# Patient Record
Sex: FEMALE | Race: WHITE | NOT HISPANIC OR LATINO | Employment: OTHER | URBAN - METROPOLITAN AREA
[De-identification: names, ages, dates, MRNs, and addresses within clinical notes are randomized per-mention and may not be internally consistent; named-entity substitution may affect disease eponyms.]

---

## 2017-01-05 ENCOUNTER — TELEPHONE (OUTPATIENT)
Dept: FAMILY MEDICINE | Facility: CLINIC | Age: 62
End: 2017-01-05

## 2017-01-05 DIAGNOSIS — R30.0 DYSURIA: Primary | ICD-10-CM

## 2017-01-05 RX ORDER — SULFAMETHOXAZOLE/TRIMETHOPRIM 800-160 MG
1 TABLET ORAL 2 TIMES DAILY
Qty: 20 TABLET | Refills: 0 | Status: SHIPPED | OUTPATIENT
Start: 2017-01-05 | End: 2017-06-01

## 2017-01-05 NOTE — TELEPHONE ENCOUNTER
Pt calls, c/o uti sxs x 3 days, denies fever, feels hot on occasion, bladder spasms, urethra burns and stinging, has superpubic catheter, has f/u appointment later this month with urologist, was in with  yesterday but did not say anything, willing to come Monday with  to office, MP please advise plan, advised appointment today, wants message to confirm plan with MP, if comes in will use our pharmacy, if MP will send rx will use [facility], inform pt of plan at [phone] (home)   Ekta Mccarthy RN, BSN  Message handled by Nurse Triage.

## 2017-01-31 DIAGNOSIS — E78.5 HYPERLIPIDEMIA LDL GOAL <100: Primary | ICD-10-CM

## 2017-01-31 NOTE — TELEPHONE ENCOUNTER
Simvastatin 20 mg tab        Last Written Prescription Date: 12/16/15  Last Fill Quantity: 90, # refills: 3    Last Office Visit with [facility], [facility] or [facility] prescribing provider:  11/18/16 Dr. Dueñas   Future Office Visit:      CHOLESTEROL   Date Value Ref Range Status   11/18/2016 147 <200 mg/dL Final     HDL CHOLESTEROL   Date Value Ref Range Status   11/18/2016 48* >49 mg/dL Final     LDL CHOLESTEROL CALCULATED   Date Value Ref Range Status   11/18/2016 64 <100 mg/dL Final     Comment:     Desirable:       <100 mg/dl     TRIGLYCERIDES   Date Value Ref Range Status   11/18/2016 174* <150 mg/dL Final     Comment:     Borderline high:  150-199 mg/dl   High:             200-499 mg/dl   Very high:       >499 mg/dl   Fasting specimen       CHOLESTEROL/HDL RATIO   Date Value Ref Range Status   12/12/2014 2.3 0.0 - 5.0 Final     ALT   Date Value Ref Range Status   11/18/2016 18 0 - 50 U/L Final

## 2017-02-01 RX ORDER — SIMVASTATIN 20 MG
20 TABLET ORAL AT BEDTIME
Qty: 90 TABLET | Refills: 3 | Status: SHIPPED | OUTPATIENT
Start: 2017-02-01 | End: 2017-07-26

## 2017-02-01 NOTE — TELEPHONE ENCOUNTER
Prescription approved per [facility] Refill Protocol  Shaniqua Nelson RN BS

## 2017-02-11 ENCOUNTER — TRANSFERRED RECORDS (OUTPATIENT)
Dept: HEALTH INFORMATION MANAGEMENT | Facility: CLINIC | Age: 62
End: 2017-02-11

## 2017-02-27 DIAGNOSIS — E03.4 HYPOTHYROIDISM DUE TO ACQUIRED ATROPHY OF THYROID: Primary | ICD-10-CM

## 2017-02-27 NOTE — TELEPHONE ENCOUNTER
levothyroxine (SYNTHROID,LEVOTHROID) 100 MCG tablet      Last Written Prescription Date: 4/12/16  Last Fill Quantity: 90,  # refills: 2   Last Office Visit with [facility], [facility] or [facility] prescribing provider: Spenser 11/18/16

## 2017-02-28 RX ORDER — LEVOTHYROXINE SODIUM 100 UG/1
100 TABLET ORAL DAILY
Qty: 90 TABLET | Refills: 1 | Status: SHIPPED | OUTPATIENT
Start: 2017-02-28 | End: 2017-06-12

## 2017-02-28 NOTE — TELEPHONE ENCOUNTER
levothyroxine (SYNTHROID,LEVOTHROID) 100 MCG tablet 90 tablet 2 4/12/2016  No   Sig: Take 1 tablet (100 mcg) by mouth daily            TSH   Date Value Ref Range Status   11/18/2016 1.32 0.40 - 4.00 mU/L Final     Prescription approved per [facility] Refill Protocol  Shaniqua Nelson RN BS

## 2017-03-05 DIAGNOSIS — I10 HYPERTENSION GOAL BP (BLOOD PRESSURE) < 140/90: ICD-10-CM

## 2017-03-06 ENCOUNTER — MYC MEDICAL ADVICE (OUTPATIENT)
Dept: FAMILY MEDICINE | Facility: CLINIC | Age: 62
End: 2017-03-06

## 2017-03-06 NOTE — TELEPHONE ENCOUNTER
lisinopril (PRINIVIL,ZESTRIL) 2.5 MG tablet      Last Written Prescription Date: 4/12/16  Last Fill Quantity: 90, # refills: 2  Last Office Visit with G, P or [facility] prescribing provider: 11/18/2016       Potassium   Date Value Ref Range Status   11/18/2016 4.5 3.4 - 5.3 mmol/L Final     Creatinine   Date Value Ref Range Status   11/18/2016 0.92 0.52 - 1.04 mg/dL Final     BP Readings from Last 3 Encounters:   11/18/16 136/86   11/14/16 104/69   10/26/16 138/82         JUNIOR GonzalesT

## 2017-03-07 ENCOUNTER — TELEPHONE (OUTPATIENT)
Dept: FAMILY MEDICINE | Facility: CLINIC | Age: 62
End: 2017-03-07

## 2017-03-07 RX ORDER — LISINOPRIL 2.5 MG/1
TABLET ORAL
Qty: 90 TABLET | Refills: 2 | Status: SHIPPED | OUTPATIENT
Start: 2017-03-07 | End: 2017-07-26

## 2017-03-07 NOTE — TELEPHONE ENCOUNTER
Pt calls, states has not received [employer] reply.  Informed message sent this morning.   Pt states 2 urologists have refused to treat me.  Referred to doctor at  who referred pt to his assistance. Appointment on April 3.   Pt will go to ER as per Paulie provider note.  Sumit Laws RN

## 2017-03-07 NOTE — TELEPHONE ENCOUNTER
Prescription approved per [facility] Refill Protocol.  Ekta Mccarthy RN, BSN

## 2017-03-07 NOTE — TELEPHONE ENCOUNTER
See mychart message, discussed with SH, needs to see urology, sent mychart response, MP SHALONDA Mccarthy, RN, BSN  Message handled by Nurse Triage with [facility] - provider name: CHAO.

## 2017-03-27 ENCOUNTER — PRE VISIT (OUTPATIENT)
Dept: UROLOGY | Facility: CLINIC | Age: 62
End: 2017-03-27

## 2017-04-03 ENCOUNTER — TELEPHONE (OUTPATIENT)
Dept: FAMILY MEDICINE | Facility: CLINIC | Age: 62
End: 2017-04-03

## 2017-04-03 NOTE — TELEPHONE ENCOUNTER
Martha Morillo RN with [facility] [phone] requests verbal order  Recert monthly nursing for catheter changes   1 x month x 2 months.   Informed approved per standing orders.   Home Care staff will fax these orders for MD signature.   Sumit Laws, RN

## 2017-04-04 DIAGNOSIS — G47.00 INSOMNIA: ICD-10-CM

## 2017-04-04 NOTE — TELEPHONE ENCOUNTER
Pending Prescriptions:                       Disp   Refills    traZODone (DESYREL) 100 MG tablet [Pharma*90 tab*0            Sig: TAKE 1 TABLET(100 MG) BY MOUTH EVERY NIGHT AS           NEEDED FOR SLEEP             Last Written Prescription Date: 10/12/2017  Last Fill Quantity: 90; # refills: 1  Last Office Visit with [facility], [facility] or [facility] prescribing provider:  11/18/2016Spenser          Last PHQ-9 score on record=   PHQ-9 SCORE 10/26/2016   Total Score -   Total Score 5       Lab Results   Component Value Date    AST 16 11/18/2016     Lab Results   Component Value Date    ALT 18 11/18/2016

## 2017-04-06 RX ORDER — TRAZODONE HYDROCHLORIDE 100 MG/1
100 TABLET ORAL AT BEDTIME
Qty: 90 TABLET | Refills: 1 | Status: SHIPPED | OUTPATIENT
Start: 2017-04-06 | End: 2017-11-24

## 2017-04-06 NOTE — TELEPHONE ENCOUNTER
Prescription approved per [facility] Refill Protocol  Shaniqua Nelson RN BS

## 2017-04-25 ENCOUNTER — TELEPHONE (OUTPATIENT)
Dept: FAMILY MEDICINE | Facility: CLINIC | Age: 62
End: 2017-04-25

## 2017-04-26 ENCOUNTER — HOSPITAL ENCOUNTER (OUTPATIENT)
Dept: ULTRASOUND IMAGING | Facility: CLINIC | Age: 62
Discharge: HOME | End: 2017-04-26
Attending: FAMILY MEDICINE | Admitting: FAMILY MEDICINE
Payer: MEDICARE

## 2017-04-26 DIAGNOSIS — R11.2 NAUSEA AND VOMITING, INTRACTABILITY OF VOMITING NOT SPECIFIED, UNSPECIFIED VOMITING TYPE: ICD-10-CM

## 2017-04-26 PROCEDURE — 76705 ECHO EXAM OF ABDOMEN: CPT

## 2017-05-01 ENCOUNTER — HOSPITAL ENCOUNTER (OUTPATIENT)
Dept: MAMMOGRAPHY | Facility: CLINIC | Age: 62
Discharge: HOME | End: 2017-05-01
Attending: FAMILY MEDICINE | Admitting: FAMILY MEDICINE
Payer: MEDICARE

## 2017-05-01 DIAGNOSIS — Z12.31 VISIT FOR SCREENING MAMMOGRAM: ICD-10-CM

## 2017-05-01 PROCEDURE — 77063 BREAST TOMOSYNTHESIS BI: CPT

## 2017-05-01 PROCEDURE — G0202 SCR MAMMO BI INCL CAD: HCPCS

## 2017-05-22 DIAGNOSIS — E11.21 TYPE 2 DIABETES MELLITUS WITH DIABETIC NEPHROPATHY (H): ICD-10-CM

## 2017-05-22 NOTE — TELEPHONE ENCOUNTER
Pending Prescriptions:                       Disp   Refills    metFORMIN (GLUCOPHAGE-XR) 500 MG 24 hr ta*360 ta*0            Sig: TAKE 2 TABLETS(1000 MG) BY MOUTH TWICE DAILY WITH           MEALS                   Last Written Prescription Date: 10/17/2016  Last Fill Quantity: 360, # refills: 1  Last Office Visit with [facility], [facility] or [facility] prescribing provider:  11/18/2016, Spenser        BP Readings from Last 3 Encounters:   11/18/16 136/86   11/14/16 104/69   10/26/16 138/82     Lab Results   Component Value Date    MICROL 97 11/18/2016     Lab Results   Component Value Date    UMALCR 124.42 11/18/2016     Creatinine   Date Value Ref Range Status   11/18/2016 0.92 0.52 - 1.04 mg/dL Final   ]  GFR Estimate   Date Value Ref Range Status   11/18/2016 62 >60 mL/min/1.7m2 Final     Comment:     Non  GFR Calc   05/19/2016 >90  Non  GFR Calc   >60 mL/min/1.7m2 Final   05/18/2016 >90  Non  GFR Calc   >60 mL/min/1.7m2 Final     GFR Estimate If Black   Date Value Ref Range Status   11/18/2016 75 >60 mL/min/1.7m2 Final     Comment:      GFR Calc   05/19/2016 >90   GFR Calc   >60 mL/min/1.7m2 Final   05/18/2016 >90   GFR Calc   >60 mL/min/1.7m2 Final     Lab Results   Component Value Date    CHOL 147 11/18/2016     Lab Results   Component Value Date    HDL 48 11/18/2016     Lab Results   Component Value Date    LDL 64 11/18/2016     Lab Results   Component Value Date    TRIG 174 11/18/2016     Lab Results   Component Value Date    CHOLHDLRATIO 2.3 12/12/2014     Lab Results   Component Value Date    AST 16 11/18/2016     Lab Results   Component Value Date    ALT 18 11/18/2016     Lab Results   Component Value Date    A1C 6.3 11/18/2016    A1C 7.7 05/16/2016    A1C 7.9 05/04/2016    A1C 7.9 04/18/2016    A1C 6.9 12/16/2015     Potassium   Date Value Ref Range Status   11/18/2016 4.5 3.4 - 5.3 mmol/L Final

## 2017-05-22 NOTE — LETTER
[address] [id number] [phone]          May 25, 2017    Bhavani White                                                                                                                     [address] [facility] [address]  [facility] [address]            Dear Bhavani,    We recently received a call from your pharmacy requesting a refill of your medication (metformin).    A review of your chart indicates that an appointment is required.  Please contact our office at [phone] to schedule your diabetes (6 month visit) doctor's appointment.    We have authorized one refill of your medication to allow time for you to schedule your appointment.    Taking care of your health is important to us and ongoing visits with your provider are vital to your care.  We look forward to seeing you in the near future.    Sincerely,    Shawnee Dueñas D.O. /Ekta MOONEY

## 2017-05-25 ENCOUNTER — TELEPHONE (OUTPATIENT)
Dept: FAMILY MEDICINE | Facility: CLINIC | Age: 62
End: 2017-05-25

## 2017-05-25 RX ORDER — METFORMIN HCL 500 MG
TABLET, EXTENDED RELEASE 24 HR ORAL
Qty: 360 TABLET | Refills: 0 | Status: SHIPPED | OUTPATIENT
Start: 2017-05-25 | End: 2017-08-31

## 2017-05-25 NOTE — TELEPHONE ENCOUNTER
One refill sent, appointment letter sent  Prescription approved per [facility] Refill Protocol.  Ekta Mccarthy RN, BSN

## 2017-05-25 NOTE — TELEPHONE ENCOUNTER
Yisel calling from  Home Care for recertification of home care services  Requests 1x/ month and 2 prn SNV for cath changes    OT to eval for new seat cushion, noticing skin breakdown with current cushion    Approved per protocol    Shaniqua Nelson RN, BS  Clinical Nurse Triage.

## 2017-05-26 DIAGNOSIS — I10 HYPERTENSION GOAL BP (BLOOD PRESSURE) < 140/90: ICD-10-CM

## 2017-05-26 NOTE — TELEPHONE ENCOUNTER
3 month Supply with 2 RF sent 3/7/17.    lisinopril (PRINIVIL/ZESTRIL) 2.5 MG tablet  Last Written Prescription Date: 3/7/17  Last Fill Quantity: 90,  # refills: 2   Last Office Visit with [facility], [facility] or [facility] prescribing provider:  11/18/2016                                          Next 5 appointments (look out 90 days)     Jun 01, 2017  1:00 PM CDT   PHYSICAL with Shawnee Dueñas DO   [facility]    [address] [place] [id number] [phone]                    Fax sent to pharm informing above.  Please disregard request.    Mila Houser, DIMITRI  May 26, 2017  3:03 PM

## 2017-05-30 RX ORDER — LISINOPRIL 2.5 MG/1
TABLET ORAL
Qty: 90 TABLET | Refills: 0
Start: 2017-05-30

## 2017-06-01 ENCOUNTER — OFFICE VISIT (OUTPATIENT)
Dept: FAMILY MEDICINE | Facility: CLINIC | Age: 62
End: 2017-06-01
Payer: COMMERCIAL

## 2017-06-01 VITALS
BODY MASS INDEX: 45.99 KG/M2 | WEIGHT: 293 LBS | HEIGHT: 67 IN | SYSTOLIC BLOOD PRESSURE: 137 MMHG | RESPIRATION RATE: 16 BRPM | DIASTOLIC BLOOD PRESSURE: 76 MMHG | HEART RATE: 80 BPM | TEMPERATURE: 98.3 F | OXYGEN SATURATION: 96 %

## 2017-06-01 DIAGNOSIS — J96.02 ACUTE RESPIRATORY FAILURE WITH HYPOXIA AND HYPERCAPNIA (H): ICD-10-CM

## 2017-06-01 DIAGNOSIS — R10.11 RUQ PAIN: Primary | ICD-10-CM

## 2017-06-01 DIAGNOSIS — R11.2 NAUSEA AND VOMITING, INTRACTABILITY OF VOMITING NOT SPECIFIED, UNSPECIFIED VOMITING TYPE: ICD-10-CM

## 2017-06-01 DIAGNOSIS — E11.21 TYPE 2 DIABETES MELLITUS WITH DIABETIC NEPHROPATHY, WITHOUT LONG-TERM CURRENT USE OF INSULIN (H): ICD-10-CM

## 2017-06-01 DIAGNOSIS — J96.01 ACUTE RESPIRATORY FAILURE WITH HYPOXIA AND HYPERCAPNIA (H): ICD-10-CM

## 2017-06-01 DIAGNOSIS — F11.90 METHADONE USE: ICD-10-CM

## 2017-06-01 DIAGNOSIS — N39.0 COMPLICATED UTI (URINARY TRACT INFECTION): ICD-10-CM

## 2017-06-01 LAB — HBA1C MFR BLD: 7.7 % (ref 4.3–6)

## 2017-06-01 PROCEDURE — 83036 HEMOGLOBIN GLYCOSYLATED A1C: CPT | Performed by: FAMILY MEDICINE

## 2017-06-01 PROCEDURE — 36415 COLL VENOUS BLD VENIPUNCTURE: CPT | Performed by: FAMILY MEDICINE

## 2017-06-01 PROCEDURE — 93000 ELECTROCARDIOGRAM COMPLETE: CPT | Performed by: FAMILY MEDICINE

## 2017-06-01 PROCEDURE — 99215 OFFICE O/P EST HI 40 MIN: CPT | Performed by: FAMILY MEDICINE

## 2017-06-01 RX ORDER — ONDANSETRON 4 MG/1
4-8 TABLET, ORALLY DISINTEGRATING ORAL EVERY 8 HOURS PRN
Qty: 20 TABLET | Refills: 1 | Status: SHIPPED | OUTPATIENT
Start: 2017-06-01 | End: 2017-12-14

## 2017-06-01 RX ORDER — METHADONE HYDROCHLORIDE 10 MG/1
20 TABLET ORAL 2 TIMES DAILY
Qty: 150 TABLET | Refills: 0 | Status: ON HOLD | COMMUNITY
Start: 2017-06-01 | End: 2020-01-21

## 2017-06-01 RX ORDER — SULFAMETHOXAZOLE/TRIMETHOPRIM 800-160 MG
1 TABLET ORAL 2 TIMES DAILY
Qty: 20 TABLET | Refills: 0 | Status: SHIPPED | OUTPATIENT
Start: 2017-06-01 | End: 2017-07-31

## 2017-06-01 RX ORDER — POLYETHYLENE GLYCOL 3350 17 G/17G
POWDER, FOR SOLUTION ORAL
Refills: 2 | COMMUNITY
Start: 2017-05-10 | End: 2017-07-31

## 2017-06-01 ASSESSMENT — ANXIETY QUESTIONNAIRES
7. FEELING AFRAID AS IF SOMETHING AWFUL MIGHT HAPPEN: NOT AT ALL
3. WORRYING TOO MUCH ABOUT DIFFERENT THINGS: NEARLY EVERY DAY
5. BEING SO RESTLESS THAT IT IS HARD TO SIT STILL: NOT AT ALL
GAD7 TOTAL SCORE: 11
1. FEELING NERVOUS, ANXIOUS, OR ON EDGE: MORE THAN HALF THE DAYS
2. NOT BEING ABLE TO STOP OR CONTROL WORRYING: MORE THAN HALF THE DAYS
6. BECOMING EASILY ANNOYED OR IRRITABLE: MORE THAN HALF THE DAYS
IF YOU CHECKED OFF ANY PROBLEMS ON THIS QUESTIONNAIRE, HOW DIFFICULT HAVE THESE PROBLEMS MADE IT FOR YOU TO DO YOUR WORK, TAKE CARE OF THINGS AT HOME, OR GET ALONG WITH OTHER PEOPLE: EXTREMELY DIFFICULT

## 2017-06-01 ASSESSMENT — PATIENT HEALTH QUESTIONNAIRE - PHQ9: 5. POOR APPETITE OR OVEREATING: MORE THAN HALF THE DAYS

## 2017-06-01 NOTE — MR AVS SNAPSHOT
After Visit Summary   6/1/2017    Bhavani White    MRN: [id number]           Patient Information     Date Of Birth          1955        Visit Information        Provider Department      6/1/2017 1:00 PM Shawnee Dueñas DO [facility]        Today's Diagnoses     RUQ pain    -  1    Complicated UTI (urinary tract infection)        Nausea and vomiting, intractability of vomiting not specified, unspecified vomiting type        Type 2 diabetes mellitus with diabetic nephropathy, without long-term current use of insulin (H)        Methadone use (H)        Acute respiratory failure with hypoxia and hypercapnia (H)           Follow-ups after your visit        Additional Services     GENERAL SURG ADULT REFERRAL       Your provider has referred you to: [facility]: [facility] [phone]   [email or web address]    Please be aware that coverage of these services is subject to the terms and limitations of your health insurance plan.  Call member services at your health plan with any benefit or coverage questions.      Please bring the following with you to your appointment:    (1) Any X-Rays, CTs or MRIs which have been performed.  Contact the facility where they were done to arrange for  prior to your scheduled appointment.   (2) List of current medications   (3) This referral request   (4) Any documents/labs given to you for this referral                  Your next 10 appointments already scheduled     Jun 19, 2017  4:30 PM CDT   (Arrive by 4:15 PM)   Return Visit with Amanda Dinero PA-C   [facility] ([facility])    [address] [place] [address]  4th [facility] [address] [phone]              Who to contact     If you have questions or need follow up information about today's clinic visit or your schedule please contact [place]  [facility] directly at [phone].  Normal or non-critical lab and imaging results will be communicated to you by MyChart, letter or phone within 4 business days after the clinic has received the results. If you do not hear from us within 7 days, please contact the clinic through [email or web address] or phone. If you have a critical or abnormal lab result, we will notify you by phone as soon as possible.  Submit refill requests through [employer] or call your pharmacy and they will forward the refill request to us. Please allow 3 business days for your refill to be completed.          Additional Information About Your Visit        [email or web address] Bean[employer] Information     [employer] gives you secure access to your electronic health record. If you see a primary care provider, you can also send messages to your care team and make appointments. If you have questions, please call your primary care clinic.  If you do not have a primary care provider, please call [phone] and they will assist you.        Care EveryWhere ID     This is your Care EveryWhere ID. This could be used by other organizations to access your [facility] medical records  [id number]        Your Vitals Were     Pulse Temperature Respirations Height Pulse Oximetry BMI (Body Mass Index)    80 98.3  F (36.8  C) (Oral) 16 5' 7\" (1.702 m) 96% 46.67 kg/m2       Blood Pressure from Last 3 Encounters:   06/01/17 137/76   11/18/16 136/86   11/14/16 104/69    Weight from Last 3 Encounters:   06/01/17 298 lb (135.2 kg)   11/18/16 298 lb (135.2 kg)   11/14/16 298 lb (135.2 kg)              We Performed the Following     DEPRESSION ACTION PLAN (DAP)     EKG 12-lead complete w/read - Clinics     GENERAL SURG ADULT REFERRAL     Hemoglobin A1c          Today's Medication Changes          These changes are accurate as of: 6/1/17  2:37 PM.  If you have any questions, ask your nurse or doctor.               Start taking these medicines.        Dose/Directions    ondansetron 4 MG ODT tab "   Commonly known as:  ZOFRAN ODT   Used for:  Nausea and vomiting, intractability of vomiting not specified, unspecified vomiting type   Started by:  Shawnee Dueñas DO        Dose:  4-8 mg   Take 1-2 tablets (4-8 mg) by mouth every 8 hours as needed for nausea   Quantity:  20 tablet   Refills:  1         These medicines have changed or have updated prescriptions.        Dose/Directions    methadone 10 MG tablet   Commonly known as:  DOLOPHINE   This may have changed:    - how much to take  - how to take this  - when to take this  - additional instructions   Used for:  Acute respiratory failure with hypoxia and hypercapnia (H)   Changed by:  Shawnee Dueñas DO        Take 2 pills TID   Quantity:  150 tablet   Refills:  0         Stop taking these medicines if you haven't already. Please contact your care team if you have questions.     LORazepam 1 MG tablet   Commonly known as:  ATIVAN   Stopped by:  Shawnee Dueñas DO           ondansetron 4 MG tablet   Commonly known as:  ZOFRAN   Stopped by:  Shawnee Dueñas DO                Where to get your medicines      These medications were sent to [facility] [address] [address]     Phone:  [phone]     ondansetron 4 MG ODT tab    sulfamethoxazole-trimethoprim 800-160 MG per tablet                Primary Care Provider Office Phone # Fax #    Shawnee Dueñas [phone]       [place] [phone]        Thank you!     Thank you for choosing [facility]  for your care. Our goal is always to provide you with excellent care. Hearing back from our patients is one way we can continue to improve our services. Please take a few minutes to complete the written survey that you may receive in the mail after your visit with us. Thank you!             Your Updated Medication List - Protect others around  you: Learn how to safely use, store and throw away your medicines at www.disposemymeds.org.          This list is accurate as of: 6/1/17  2:37 PM.  Always use your most recent med list.                   Brand Name Dispense Instructions for use    * albuterol 108 (90 BASE) MCG/ACT Inhaler    PROAIR HFA/PROVENTIL HFA/VENTOLIN HFA    1 Inhaler    Inhale 2 puffs into the lungs every 6 hours as needed for shortness of breath / dyspnea or wheezing       * albuterol (2.5 MG/3ML) 0.083% neb solution     30 vial    Take 1 vial (2.5 mg) by nebulization every 4 hours as needed for shortness of breath / dyspnea or wheezing       aspirin 81 MG tablet     30 tablet    Take by mouth daily       blood glucose monitoring lancets     1 Box    Use to test blood sugar 1 times daily or as directed.       blood glucose monitoring meter device kit     1 kit    Use to test blood sugar 1 times daily or as directed.       buPROPion 300 MG 24 hr tablet    WELLBUTRIN XL     TK 1 T PO QD       CYMBALTA PO      Take 120 mg by mouth daily       diazepam 5 MG tablet    VALIUM    30 tablet    Take 1 tablet (5 mg) by mouth every 8 hours as needed for anxiety or other (pain, muscle spasm)       gabapentin 600 MG tablet    NEURONTIN     1,200 mg 3 times daily       levothyroxine 100 MCG tablet    SYNTHROID/LEVOTHROID    90 tablet    Take 1 tablet (100 mcg) by mouth daily       lisinopril 2.5 MG tablet    PRINIVIL/Zestril    90 tablet    TAKE 1 TABLET BY MOUTH EVERY DAY       metFORMIN 500 MG 24 hr tablet    GLUCOPHAGE-XR    360 tablet    TAKE 2 TABLETS(1000 MG) BY MOUTH TWICE DAILY WITH MEALS       methadone 10 MG tablet    DOLOPHINE    150 tablet    Take 2 pills TID       * MIRALAX PO      1 capful daily prn       * polyethylene glycol powder    MIRALAX/GLYCOLAX     TK 17 G PO PRN FOR CONSTIPATION       MULTIVITAMIN PO      1 a day       nystatin 376480 UNIT/GM Powd    MYCOSTATIN    30 g    Apply topically 3 times daily as needed       ondansetron 4  MG ODT tab    ZOFRAN ODT    20 tablet    Take 1-2 tablets (4-8 mg) by mouth every 8 hours as needed for nausea       * order for DME     1 each    Equipment being ordered: Hospital Bed, total electric (head, foot, and height adjustments), with any type side rails, with mattress       * order for DME     1 each    Equipment being ordered: Motorized Wheelchair       * order for DME     1 each    Equipment being ordered: Trapeze bar for hospital bed       * order for DME     1 Package    Equipment being ordered: Nebulizer       * order for DME     1 each    Equipment being ordered: BIPAP.  15/5, Rate of 12, 2L O2 to keep sats 90-95%.       PYRIDIUM PO      Take 2 tablets by mouth 3 times daily as needed Dose unknown       senna-docusate 8.6-50 MG per tablet    SENOKOT-S;PERICOLACE    100 tablet    Take 2 tablets by mouth 2 times daily       simvastatin 20 MG tablet    ZOCOR    90 tablet    Take 1 tablet (20 mg) by mouth At Bedtime       * sulfamethoxazole-trimethoprim 800-160 MG per tablet    BACTRIM DS/SEPTRA DS    14 tablet    Take 1 tablet by mouth 2 times daily       * sulfamethoxazole-trimethoprim 800-160 MG per tablet    BACTRIM DS/SEPTRA DS    20 tablet    Take 1 tablet by mouth 2 times daily       tiZANidine 4 MG tablet    ZANAFLEX    90 tablet    Take 1 tablet (4 mg) by mouth 3 times daily       traZODone 100 MG tablet    DESYREL    90 tablet    Take 1 tablet (100 mg) by mouth At Bedtime       * Notice:  This list has 11 medication(s) that are the same as other medications prescribed for you. Read the directions carefully, and ask your doctor or other care provider to review them with you.

## 2017-06-01 NOTE — NURSING NOTE
"Chief Complaint   Patient presents with     Physical       Initial /76 (BP Location: Left arm, Patient Position: Chair, Cuff Size: Adult Large)  Pulse 80  Temp 98.3  F (36.8  C) (Oral)  Resp 16  Ht 5' 7\" (1.702 m)  Wt 298 lb (135.2 kg)  SpO2 96%  BMI 46.67 kg/m2 Estimated body mass index is 46.67 kg/(m^2) as calculated from the following:    Height as of this encounter: 5' 7\" (1.702 m).    Weight as of this encounter: 298 lb (135.2 kg).  Medication Reconciliation: complete   Ame Ayala, LORENZA      "

## 2017-06-01 NOTE — PROGRESS NOTES
SUBJECTIVE:     CC: Bhavani White is an 61 year old woman who presents for preventive health visit.  However her health is very complicated and she had multiple issues she wanted to discuss, so her visit was changed to a sick visit.       Concerns:  1.) She has continued to have issues with vomiting and RUQ pain after eating.  Symptoms have been getting worse and occur more frequently.  She has lost her appetite and has lost about 15-20 lbs.  She has been vomiting up the Zofran I gave her and is requesting a dissolvable tablet. Gall bladder US showed sludge and wall thickening, but no stones.      2.)  Has a permanent suprapubic cath that is consistently causing issues.  Constantly leaks and she often feels burning sensations with the leakage.  She has seen many urologists in the past, but feels they are not giving her answers.  She is seeing urology at the [facility] on 6/19 and hopes they will be able to help her.  In the past, when she is treated with abx her dysuria goes away.  We stopped getting UAs because she is heavily colonized anyway.  Last abx was last month at urgent care.  No fevers, but recently having burning sensation again and sweating.  She is aware that frequent abx for this may cause abx resistance, however it is difficult for her to live with these symptoms.        3)  She also notes that her home RN has noticed a small area of breakdown in her vulvar area from her catheter.  She is working with them to get a different wheelchair cushion to help offload the area.      4) Patient needs and EKG done to check QR interval since she is on methadone        Social History   Substance Use Topics     Smoking status: Never Smoker     Smokeless tobacco: Never Used     Alcohol use No     The patient does not drink >3 drinks per day nor >7 drinks per week.    Recent Labs   Lab Test  11/18/16   1132 01/12/15  12/12/14   1345  06/26/13   CHOL  147  156  133   --   156   HDL  48*  51  58   --   43   LDL  64  " 70  32   < >  75   TRIG  174*  176  216*   --   192*   CHOLHDLRATIO   --    --   2.3   --   3.63   NHDL  99   --    --    --   113    < > = values in this interval not displayed.     ROS:  C: POSITIVE for chills, change in weight  I: POSITIVE for skin breakdown   E: NEGATIVE for vision changes or irritation  ENT: NEGATIVE for ear, mouth and throat problems  R: NEGATIVE for significant cough or SOB  CV: NEGATIVE for chest pain, palpitations or peripheral edema  GI: POSITIVE for abd pain and nausea  : POSITIVE for dysuria and chronic catheterization  M: NEGATIVE for significant arthralgias or myalgia  N: NEGATIVE for weakness, dizziness or paresthesias  P: POSITIVE For changes in mood (anniversary of her son's death)    Problem list, Medication list, Allergies, and Medical/Social/Surgical histories reviewed in [facility] and updated as appropriate.  OBJECTIVE:     /76 (BP Location: Left arm, Patient Position: Chair, Cuff Size: Adult Large)  Pulse 80  Temp 98.3  F (36.8  C) (Oral)  Resp 16  Ht 5' 7\" (1.702 m)  Wt 298 lb (135.2 kg)  SpO2 96%  BMI 46.67 kg/m2  EXAM:  GENERAL: healthy, alert and no distress  RESP: lungs clear to auscultation - no rales, rhonchi or wheezes  CV: regular rates and rhythm, normal S1 S2, no S3 or S4 and no murmur, click or rub  ABDOMEN: Obese.  +TTP in epigastrum and RUQ.  No masses or organomegaly.  Multiple large hernias in umbilical region    (female): Patient refused vaginal exam to look at skin breakdown     EKG: RBBB    ASSESSMENT/PLAN:       1. RUQ pain  - RUQ pain and vomiting after meals consistent with gall bladder issues  - US shows wall thickening and sludge  - Will refer to surgery to discuss possible removal   - GENERAL SURG ADULT REFERRAL    2. Complicated UTI (urinary tract infection)  - Patient has a chronic suprapubic cath and chronic bacterial colonization  - Patient frequently gets dysuria that resolves with abx and also has frequent leaking issues with her " catheter  - She will be seeing a new urologist at the [facility] later this month to see if they can help manage her complicated urological history   - Discussed that frequent abx for these dysuria episodes could eventually cause abx resistance   - sulfamethoxazole-trimethoprim (BACTRIM DS/SEPTRA DS) 800-160 MG per tablet; Take 1 tablet by mouth 2 times daily  Dispense: 20 tablet; Refill: 0    3. Nausea and vomiting, intractability of vomiting not specified, unspecified vomiting type  - 2/2 likely gall bladder issues   - GENERAL SURG ADULT REFERRAL  - ondansetron (ZOFRAN ODT) 4 MG ODT tab; Take 1-2 tablets (4-8 mg) by mouth every 8 hours as needed for nausea  Dispense: 20 tablet; Refill: 1    4. Type 2 diabetes mellitus with diabetic nephropathy, without long-term current use of insulin (H)  - Hemoglobin A1c    5. Methadone use (H)  - EKG 12-lead complete w/read - Clinics    6. Acute respiratory failure with hypoxia and hypercapnia (H)  - Patient recently changed methadone dose  - [facility]   - methadone (DOLOPHINE) 10 MG tablet; Take 2 pills TID  Dispense: 150 tablet; Refill: 0    F/U after urology and surgery referrals       Shawnee Dueñas,   [facility]

## 2017-06-02 ENCOUNTER — TELEPHONE (OUTPATIENT)
Dept: SURGERY | Facility: CLINIC | Age: 62
End: 2017-06-02

## 2017-06-02 ASSESSMENT — PATIENT HEALTH QUESTIONNAIRE - PHQ9: SUM OF ALL RESPONSES TO PHQ QUESTIONS 1-9: 15

## 2017-06-02 ASSESSMENT — ANXIETY QUESTIONNAIRES: GAD7 TOTAL SCORE: 11

## 2017-06-02 ASSESSMENT — ASTHMA QUESTIONNAIRES: ACT_TOTALSCORE: 25

## 2017-06-02 NOTE — TELEPHONE ENCOUNTER
Referral received from Shawnee Dueñas for Nausea and vomiting.    Attempt #1:    Called patient at [phone].  Spoke with pt she will back back to schedule an appointment when she is ready at [phone].   Anabelle

## 2017-06-05 ENCOUNTER — PRE VISIT (OUTPATIENT)
Dept: UROLOGY | Facility: CLINIC | Age: 62
End: 2017-06-05

## 2017-06-12 DIAGNOSIS — E03.4 HYPOTHYROIDISM DUE TO ACQUIRED ATROPHY OF THYROID: ICD-10-CM

## 2017-06-12 NOTE — TELEPHONE ENCOUNTER
Levothyroxine 0.100mg tab      Last Written Prescription Date: 02/28/17  Last Quantity: 90, # refills: 1  Last Office Visit with G, P or [facility] prescribing provider: 06/01/17 Dr. Dueñas     TSH   Date Value Ref Range Status   11/18/2016 1.32 0.40 - 4.00 mU/L Final

## 2017-06-15 RX ORDER — LEVOTHYROXINE SODIUM 100 UG/1
TABLET ORAL
Qty: 90 TABLET | Refills: 0 | Status: SHIPPED | OUTPATIENT
Start: 2017-06-15 | End: 2017-09-27

## 2017-06-16 ENCOUNTER — TRANSFERRED RECORDS (OUTPATIENT)
Dept: HEALTH INFORMATION MANAGEMENT | Facility: CLINIC | Age: 62
End: 2017-06-16

## 2017-06-26 ENCOUNTER — OFFICE VISIT (OUTPATIENT)
Dept: SURGERY | Facility: CLINIC | Age: 62
End: 2017-06-26
Payer: COMMERCIAL

## 2017-06-26 VITALS
DIASTOLIC BLOOD PRESSURE: 80 MMHG | HEIGHT: 67 IN | BODY MASS INDEX: 45.99 KG/M2 | WEIGHT: 293 LBS | SYSTOLIC BLOOD PRESSURE: 120 MMHG | OXYGEN SATURATION: 96 % | HEART RATE: 92 BPM

## 2017-06-26 DIAGNOSIS — K43.9 ABDOMINAL WALL HERNIA: ICD-10-CM

## 2017-06-26 DIAGNOSIS — R10.11 ABDOMINAL PAIN, RIGHT UPPER QUADRANT: Primary | ICD-10-CM

## 2017-06-26 PROCEDURE — 99204 OFFICE O/P NEW MOD 45 MIN: CPT | Performed by: SURGERY

## 2017-06-26 ASSESSMENT — ENCOUNTER SYMPTOMS
VOMITING: 1
NAUSEA: 1
ABDOMINAL PAIN: 1
APPETITE CHANGE: 1
CONSTIPATION: 1

## 2017-06-26 NOTE — PATIENT INSTRUCTIONS
CT ABDOMEN AND PELVIS WITH CONTRAST     Date: 6-28-17  Time: 3:00 pm     Location:[place] [address] [place]  [address]        Please check in at 2:30 pm       Preparation for CT scanning      Do not eat or drink anything TWO hours prior to your exam except for prep:    Mix all the liquid from the bottle Omnipaque 140 (50mL) with 24 ounces of water.     Drink 1/2 of the mixture at:  1:00 pm  (two hours before)  Drink the rest of the mixture at:  2:00 pm  (one hour before)    Please bring an updated list of all your medications, including IV Chemo Therapy over the counter and herbal supplements.    For questions regarding your test please call [phone].   If you are taking any medications and you have questions, please call your primary care physician.

## 2017-06-26 NOTE — PROGRESS NOTES
HPI      ROS (Review of Systems):      Positive for appetite change, back injury, thyroid problem, diabetes, DM controlled with Oral Medication and System Review.   GASTROINTESTINAL: Positive for nausea, vomiting, abdominal pain and constipation.   MUSCULOSKELETAL: Positive for back pain.  System Review has been done        Physical Exam

## 2017-06-26 NOTE — MR AVS SNAPSHOT
After Visit Summary   6/26/2017    Bhavani White    MRN: [id number]           Patient Information     Date Of Birth          1955        Visit Information        Provider Department      6/26/2017 3:00 PM Ismael Meza MD [facility] General [facility]      Today's Diagnoses     Abdominal pain, right upper quadrant    -  1    Abdominal wall hernia          Care Instructions    CT ABDOMEN AND PELVIS WITH CONTRAST     Date: 6-28-17  Time: 3:00 pm     Location:[facility]  [address] [place]  [address]        Please check in at 2:30 pm       Preparation for CT scanning      Do not eat or drink anything TWO hours prior to your exam except for prep:    Mix all the liquid from the bottle Omnipaque 140 (50mL) with 24 ounces of water.     Drink 1/2 of the mixture at:  1:00 pm  (two hours before)  Drink the rest of the mixture at:  2:00 pm  (one hour before)    Please bring an updated list of all your medications, including IV Chemo Therapy over the counter and herbal supplements.    For questions regarding your test please call [phone].   If you are taking any medications and you have questions, please call your primary care physician.                 Follow-ups after your visit        Your next 10 appointments already scheduled     Jun 28, 2017  3:00 PM CDT   CT ABDOMEN PELVIS W CONTRAST with RHCT2   [facility] ([facility])    [address] [address]   [phone]           Please bring any scans or X-rays taken at other hospitals, if similar tests were done. Also bring a list of your medicines, including vitamins, minerals and over-the-counter drugs. It is safest to leave personal items at home.  Be sure to tell your doctor:   If you have any allergies.   If there s any chance you are pregnant.   If you are breastfeeding.   If you have any special needs.  You may have  contrast for this exam. To prepare:   Do not eat or drink for 2 hours before your exam. If you need to take medicine, you may take it with small sips of water. (We may ask you to take liquid medicine as well.)   The day before your exam, drink extra fluids at least six 8-ounce glasses (unless your doctor tells you to restrict your fluids).  Patients over 70 or patients with diabetes or kidney problems:   If you haven t had a blood test (creatinine test) within the last 30 days, go to your clinic or Diagnostic Imaging Department for this test.  If you have diabetes:   If your kidney function is normal, continue taking your metformin (Avandamet, Glucophage, Glucovance, Metaglip) on the day of your exam.   If your kidney function is abnormal, wait 48 hours before restarting this medicine.  You will have oral contrast for this exam:   You will drink the contrast at home. Get this from your clinic or Diagnostic Imaging Department. Please follow the directions given.  Please wear loose clothing, such as a sweat suit or jogging clothes. Avoid snaps, zippers and other metal. We may ask you to undress and put on a hospital gown.  If you have any questions, please call the Imaging Department where you will have your exam.            Jun 30, 2017  1:30 PM CDT   NM HEPATOBILIARY SCAN W JONNA LOPEZ with [id number]   [facility] [place] ([facility])    [address] [address] [address] [phone]           Please bring a list of your medicines to the exam. (Include vitamins, minerals and over-the-counter drugs.) You should wear comfortable clothes. Leave your valuables at home. Please bring related prior results and films.  Tell your doctor:   If you are breastfeeding or may be pregnant.   If you have had a barium test within the past few days. Barium may change the results of certain exams.   If you think you may need sedation (medicine to help you relax).  No food or drink  (including water) for 4 hours prior to the exam.  No morphine or morphine derivatives for 6 hours prior to the exam.  Please call your Imaging Department at your exam site with any questions.            Jul 31, 2017  5:00 PM CDT   (Arrive by 4:45 PM)   Return Visit with Yoandy Rios MD   [facility] ([facility])    [address] [address] [phone]              Future tests that were ordered for you today     Open Future Orders        Priority Expected Expires Ordered    NM HepatOBiliary Scan w CCK Routine 6/26/2017 6/26/2018 6/26/2017    CT Abdomen Pelvis w Contrast Routine 6/26/2017 6/26/2018 6/26/2017            Who to contact     If you have questions or need follow up information about today's clinic visit or your schedule please contact [facility] directly at [phone].  Normal or non-critical lab and imaging results will be communicated to you by Glusterhart, letter or phone within 4 business days after the clinic has received the results. If you do not hear from us within 7 days, please contact the clinic through [employer] or phone. If you have a critical or abnormal lab result, we will notify you by phone as soon as possible.  Submit refill requests through Corebook or call your pharmacy and they will forward the refill request to us. Please allow 3 business days for your refill to be completed.          Additional Information About Your Visit        GlusterharLiquipel Information     Corebook gives you secure access to your electronic health record. If you see a primary care provider, you can also send messages to your care team and make appointments. If you have questions, please call your primary care clinic.  If you do not have a primary care provider, please call [phone] and they will assist you.        Care EveryWhere ID     This is your Care EveryWhere ID. This could be  "used by other organizations to access your [facility] medical records  [id number]        Your Vitals Were     Pulse Height Pulse Oximetry Breastfeeding? BMI (Body Mass Index)       92 5' 7\" (1.702 m) 96% No 46.67 kg/m2        Blood Pressure from Last 3 Encounters:   06/26/17 120/80   06/01/17 137/76   11/18/16 136/86    Weight from Last 3 Encounters:   06/26/17 298 lb (135.2 kg)   06/01/17 298 lb (135.2 kg)   11/18/16 298 lb (135.2 kg)               Primary Care Provider Office Phone # Fax #    Shawnee Dueñas [phone]       [address] [address]        Equal Access to Services     JOSE LUIS GOMEZ : Hadii bert yu Sosekou, waaxda luqadaha, qaybta kaalmada adeegyada, rowan gaviria . So [facility] [phone].    ATENCIÓN: Si habla español, tiene a shay disposición servicios gratuitos de asistencia lingüística. Zoraida al [phone].    We comply with applicable federal civil rights laws and [place] laws. We do not discriminate on the basis of race, color, national origin, age, disability sex, sexual orientation or gender identity.            Thank you!     Thank you for choosing [facility]  for your care. Our goal is always to provide you with excellent care. Hearing back from our patients is one way we can continue to improve our services. Please take a few minutes to complete the written survey that you may receive in the mail after your visit with us. Thank you!             Your Updated Medication List - Protect others around you: Learn how to safely use, store and throw away your medicines at www.disposemymeds.org.          This list is accurate as of: 6/26/17  4:11 PM.  Always use your most recent med list.                   Brand Name Dispense Instructions for use Diagnosis    * albuterol 108 (90 BASE) MCG/ACT Inhaler    PROAIR HFA/PROVENTIL HFA/VENTOLIN HFA    1 Inhaler    Inhale 2 puffs into " the lungs every 6 hours as needed for shortness of breath / dyspnea or wheezing    Moderate persistent asthma with acute exacerbation       * albuterol (2.5 MG/3ML) 0.083% neb solution     30 vial    Take 1 vial (2.5 mg) by nebulization every 4 hours as needed for shortness of breath / dyspnea or wheezing    Moderate persistent asthma with acute exacerbation       aspirin 81 MG tablet     30 tablet    Take by mouth daily    Type 2 diabetes, HbA1c goal < 7% (H)       blood glucose monitoring lancets     1 Box    Use to test blood sugar 1 times daily or as directed.    Type 2 diabetes mellitus with diabetic nephropathy (H)       blood glucose monitoring meter device kit     1 kit    Use to test blood sugar 1 times daily or as directed.    Type 2 diabetes mellitus with diabetic nephropathy (H)       buPROPion 300 MG 24 hr tablet    WELLBUTRIN XL     TK 1 T PO QD        CYMBALTA PO      Take 120 mg by mouth daily        diazepam 5 MG tablet    VALIUM    30 tablet    Take 1 tablet (5 mg) by mouth every 8 hours as needed for anxiety or other (pain, muscle spasm)    Acute respiratory failure with hypoxia and hypercapnia (H)       gabapentin 600 MG tablet    NEURONTIN     1,200 mg 3 times daily        levothyroxine 100 MCG tablet    SYNTHROID/LEVOTHROID    90 tablet    TAKE 1 TABLET(100 MCG) BY MOUTH DAILY    Hypothyroidism due to acquired atrophy of thyroid       lisinopril 2.5 MG tablet    PRINIVIL/Zestril    90 tablet    TAKE 1 TABLET BY MOUTH EVERY DAY    Hypertension goal BP (blood pressure) < 140/90       metFORMIN 500 MG 24 hr tablet    GLUCOPHAGE-XR    360 tablet    TAKE 2 TABLETS(1000 MG) BY MOUTH TWICE DAILY WITH MEALS    Type 2 diabetes mellitus with diabetic nephropathy (H)       methadone 10 MG tablet    DOLOPHINE    150 tablet    Take 2 pills TID    Acute respiratory failure with hypoxia and hypercapnia (H)       * MIRALAX PO      1 capful daily prn        * polyethylene glycol powder    MIRALAX/GLYCOLAX      TK 17 G PO PRN FOR CONSTIPATION        MULTIVITAMIN PO      1 a day        nystatin 609459 UNIT/GM Powd    MYCOSTATIN    30 g    Apply topically 3 times daily as needed    Candidiasis of skin       ondansetron 4 MG ODT tab    ZOFRAN ODT    20 tablet    Take 1-2 tablets (4-8 mg) by mouth every 8 hours as needed for nausea    Nausea and vomiting, intractability of vomiting not specified, unspecified vomiting type       * order for DME     1 each    Equipment being ordered: Hospital Bed, total electric (head, foot, and height adjustments), with any type side rails, with mattress    Wheelchair bound, Ankylosing spondylitis (H), Spinal stenosis in cervical region, Lumbar spinal stenosis, Fibromyalgia, Chronic low back pain       * order for DME     1 each    Equipment being ordered: Motorized Wheelchair    Wheelchair bound, Ankylosing spondylitis (H), Spinal stenosis in cervical region, Lumbar spinal stenosis, Fibromyalgia, Chronic low back pain       * order for DME     1 each    Equipment being ordered: Trapeze bar for hospital bed    Wheelchair bound, Ankylosing spondylitis (H), Spinal stenosis in cervical region, Lumbar spinal stenosis, Fibromyalgia, Chronic low back pain       * order for DME     1 Package    Equipment being ordered: Nebulizer    Moderate persistent asthma with acute exacerbation       * order for DME     1 each    Equipment being ordered: BIPAP.  15/5, Rate of 12, 2L O2 to keep sats 90-95%.    Acute respiratory failure with hypoxia and hypercapnia (H)       PYRIDIUM PO      Take 2 tablets by mouth 3 times daily as needed Dose unknown        senna-docusate 8.6-50 MG per tablet    SENOKOT-S;PERICOLACE    100 tablet    Take 2 tablets by mouth 2 times daily    Acute respiratory failure with hypoxia and hypercapnia (H)       simvastatin 20 MG tablet    ZOCOR    90 tablet    Take 1 tablet (20 mg) by mouth At Bedtime    Hyperlipidemia LDL goal <100       * sulfamethoxazole-trimethoprim 800-160 MG per  tablet    BACTRIM DS/SEPTRA DS    14 tablet    Take 1 tablet by mouth 2 times daily    Dysuria       * sulfamethoxazole-trimethoprim 800-160 MG per tablet    BACTRIM DS/SEPTRA DS    20 tablet    Take 1 tablet by mouth 2 times daily    Complicated UTI (urinary tract infection)       tiZANidine 4 MG tablet    ZANAFLEX    90 tablet    Take 1 tablet (4 mg) by mouth 3 times daily        traZODone 100 MG tablet    DESYREL    90 tablet    Take 1 tablet (100 mg) by mouth At Bedtime    Insomnia       * Notice:  This list has 11 medication(s) that are the same as other medications prescribed for you. Read the directions carefully, and ask your doctor or other care provider to review them with you.

## 2017-06-26 NOTE — LETTER
"2017    RE:  Bhavani White-:  57    Chief complaint:  Abdominal pain, epigastric     HPI:  This patient is a 61 year old  female who presents with right upper quadrant abdominal pain and nausea for the past 18 months.  The pain/nausea is intermittent.  It occurs with eating.  She also reports diffuse abdominal pain and stomach irritation.  She also has back pain but this may be due to her primary back problems.  She reports a significant incisional hernia that is also bothersome to her from her previous removal of a large ovarian tumor.  She has been told that she has a \"Greenlandic cheese\" hernia.  She has an indwelling suprapubic catheter chronically (neurogenic bladder) but reports that she will be seeing a urologist at [facility] for possible removal of this catheter next week.  She is here with her significant other.     Past Medical History:  Has a past medical history of Anxiety; Asthma; Depression; DM (diabetes mellitus) (H) (); Frequent UTI; History of ulcer disease (2014); Hypertension; Hypothyroid; Iatrogenic Cushing's disease (H); Morbid obesity (H); MRSA (methicillin resistant staph aureus) culture positive (2012); Osteoarthritis; Other chronic pain; and Sleep apnea.     Review of Systems:  The 10 point Review of Systems is negative other than noted in the HPI and above.     Physical Exam:  General - This is a well developed, well nourished female in no apparent distress.  HEENT - Normocephalic, atraumatic.  NO scleral icterus.  Neck - supple without masses  Lungs - respirations regular and non-labored.    Abdomen:Body mass index is 46.67 kg/(m^2).                        soft, non-distended with mild tenderness noted in the epigastric region.  All hernias easily palpable in the central abdomen with multiple loculations consistent with multiple incisional hernias. .  Extremities - warm without edema  Neurologic - nonfocal     Relevant labs:  Liver function panel- " normal  WBC- normal  Lipase- normal     Imaging:  Ultrasound RUQ: negative cholelithiasis (possible sludge -question of small stones in sludge), negative gallbladder wall thickening (0.3 cm), negative ductal dilatation, negative pericholecystic fluid, negative sonographic Vogel's sign.     Assessment and Plan:  It is my impression that she has probable cholecystitis, significant incisional hernia, neurologic problems including neurogenic bladder with chronic suprapubic catheter.  I have offered her a Laparoscopic cholecystectomy with cholangiograms.  We have discussed the indication, risks and expected recovery.  Risks discussed included duct/ organ injury, conversion to open surgery with increased recovery, post cholecystectomy syndromes and their treatment. We discussed option of HIDA scan to further assess GB function (recommended by radiology).   We also discussed alternatives including dissolution, lithotripsy and low fat diet. Literature was given to review.        We discussed her hernias.  She would like to undergo repair of these hernias.  I discussed cosmetics (removal of the umbilicus) and the use of mesh to reduce the risk of recurrence.  I think biologic mesh may be safer in her situation especially if she continues to require chronic suprapubic catheter.  Her morbid obesity would make recurrence more likely.     For now, she will see a urologist at [place] as already planned to see if her catheter can be removed.  We will obtain a hiatus scan as recommended by radiology and a CT scan of the abdomen to more fully evaluate her incisional hernia.  I will attempt to contact her neurologist (Dr. Khris Amado) to see if he thinks that some of her abdominal symptoms may be related to her neurologic conditions.     We will work on scheduling these imaging tests at the patient s convenience.     Ismael Meza MD  [facility] [place]

## 2017-06-26 NOTE — PROGRESS NOTES
"Chief complaint:  Abdominal pain, epigastric    HPI:  This patient is a 61 year old  female who presents with right upper quadrant abdominal pain and nausea for the past 18 months.  The pain/nausea is intermittent.  It occurs with eating.  She also reports diffuse abdominal pain and stomach irritation.  She also has back pain but this may be due to her primary back problems.  She reports a significant incisional hernia that is also bothersome to her from her previous removal of a large ovarian tumor.  She has been told that she has a \"Canadian cheese\" hernia.  She has an indwelling suprapubic catheter chronically (neurogenic bladder) but reports that she will be seeing a urologist at [place] for possible removal of this catheter next week.  She is here with her significant other.    Past Medical History:   has a past medical history of Anxiety; Asthma; Depression; DM (diabetes mellitus) (H) (2003); Frequent UTI; History of ulcer disease (11/22/2014); Hypertension; Hypothyroid; Iatrogenic Cushing's disease (H); Morbid obesity (H); MRSA (methicillin resistant staph aureus) culture positive (5/24/2012); Osteoarthritis; Other chronic pain; and Sleep apnea.    Past Surgical History:  Past Surgical History:   Procedure Laterality Date     C LAMINECTOMY,FACETECTOMY,LUMBAR  1982     C TOTAL KNEE ARTHROPLASTY  1999    left     DISCECTOMY, FUSION CERVICAL ANTERIOR THREE+ LEVELS, COMBINED Left 5/3/2016    Procedure: COMBINED DISCECTOMY, FUSION CERVICAL ANTERIOR THREE+ LEVELS;  Surgeon: Jasvir Torres MD;  Location: [facility] OR     GENITOURINARY SURGERY      suprapubic catheter     HERNIA REPAIR  1957    double hernia     HYSTERECTOMY, PAP NO LONGER INDICATED      CELIA and large ovarian tumor removed     SURGICAL HISTORY OF -       left cataract surgery     SURGICAL HISTORY OF -   12/14    right cataract surgery and left laser revision     SURGICAL HISTORY OF -   March 2015    left carpal tunnel release     " TONSILLECTOMY  1974        Social History:  Social History     Social History     Marital status:      Spouse name: N/A     Number of children: N/A     Years of education: N/A     Occupational History     Not on file.     Social History Main Topics     Smoking status: Never Smoker     Smokeless tobacco: Never Used     Alcohol use No     Drug use: No     Sexual activity: Not Currently     Other Topics Concern     Not on file     Social History Narrative        Family History:  Family History   Problem Relation Age of Onset     Depression Son      Hypertension Mother      HEART DISEASE Mother      bypass     Depression Mother      Hypertension Father      Connective Tissue Disorder Father      ankylosing spondylitis     CANCER Father      colon; prostate     Other Cancer Father      bladder cancer     Depression Sister        Review of Systems:  The 10 point Review of Systems is negative other than noted in the HPI and above.    Physical Exam:  General - This is a well developed, well nourished female in no apparent distress.  HEENT - Normocephalic, atraumatic.  NO scleral icterus.  Neck - supple without masses  Lungs - respirations regular and non-labored.    Abdomen:Body mass index is 46.67 kg/(m^2).   soft, non-distended with mild tenderness noted in the epigastric region.  All hernias easily palpable in the central abdomen with multiple loculations consistent with multiple incisional hernias. .  Extremities - warm without edema  Neurologic - nonfocal    Relevant labs:  Liver function panel- normal  WBC- normal  Lipase- normal    Imaging:  Ultrasound RUQ: negative cholelithiasis (possible sludge -question of small stones in sludge), negative gallbladder wall thickening (0.3 cm), negative ductal dilatation, negative pericholecystic fluid, negative sonographic Vogel's sign.    Assessment and Plan:  It is my impression that she has probable cholecystitis, significant incisional hernia, neurologic problems  including neurogenic bladder with chronic suprapubic catheter.  I have offered her a Laparoscopic cholecystectomy with cholangiograms.  We have discussed the indication, risks and expected recovery.  Risks discussed included duct/ organ injury, conversion to open surgery with increased recovery, post cholecystectomy syndromes and their treatment. We discussed option of HIDA scan to further assess GB function (recommended by radiology).   We also discussed alternatives including dissolution, lithotripsy and low fat diet. Literature was given to review.      We discussed her hernias.  She would like to undergo repair of these hernias.  I discussed cosmetics (removal of the umbilicus) and the use of mesh to reduce the risk of recurrence.  I think biologic mesh may be safer in her situation especially if she continues to require chronic suprapubic catheter.  Her morbid obesity would make recurrence more likely.    For now, she will see a urologist at [place] as already planned to see if her catheter can be removed.  We will obtain a hiatus scan as recommended by radiology and a CT scan of the abdomen to more fully evaluate her incisional hernia.  I will attempt to contact her neurologist (Dr. Khris Amado) to see if he thinks that some of her abdominal symptoms may be related to her neurologic conditions.    We will work on scheduling these imaging tests at the patient s convenience.    Ismael Meza MD  [facility] [place]    Please route or send letter to:  Primary Care Provider (PCP) and Include Progress Note

## 2017-06-26 NOTE — LETTER
[facility]    [address], [address]  [place], [place] [address]  Phone [phone]  Fax [phone] [address], [address]  [place] [address]  Phone [phone]  Fax [phone]    [email or web address]   Dear Dr. Amado,  I just saw our common patient Bhavani White.  She is seeing me for gallbladder problems and an incisional hernia.  She reports chronic GI problems particularly including constipation.  I'm writing to ask you whether this could be in part due to her neurologic problems.  Please let me know if you think that these are all related.  Currently, we are contemplating cholecystectomy and hernia repair during the same anesthetic.  Any additional perioperative recommendations you could give us would also be appreciated.    Ismael Meza MD  6/26/2017 4:12 PM

## 2017-06-28 ENCOUNTER — HOSPITAL ENCOUNTER (OUTPATIENT)
Dept: CT IMAGING | Facility: CLINIC | Age: 62
Discharge: HOME | End: 2017-06-28
Attending: SURGERY | Admitting: SURGERY
Payer: MEDICARE

## 2017-06-28 DIAGNOSIS — K43.9 ABDOMINAL WALL HERNIA: ICD-10-CM

## 2017-06-28 LAB
CREAT BLD-MCNC: 0.9 MG/DL (ref 0.52–1.04)
GFR SERPL CREATININE-BSD FRML MDRD: 64 ML/MIN/1.7M2

## 2017-06-28 PROCEDURE — 82565 ASSAY OF CREATININE: CPT

## 2017-06-28 PROCEDURE — 74177 CT ABD & PELVIS W/CONTRAST: CPT

## 2017-06-28 PROCEDURE — 25000128 H RX IP 250 OP 636: Performed by: SURGERY

## 2017-06-28 RX ORDER — IOPAMIDOL 755 MG/ML
500 INJECTION, SOLUTION INTRAVASCULAR ONCE
Status: COMPLETED | OUTPATIENT
Start: 2017-06-28 | End: 2017-06-28

## 2017-06-28 RX ADMIN — IOPAMIDOL 100 ML: 755 INJECTION, SOLUTION INTRAVENOUS at 14:52

## 2017-06-28 RX ADMIN — SODIUM CHLORIDE 65 ML: 9 INJECTION, SOLUTION INTRAVENOUS at 14:52

## 2017-07-12 ENCOUNTER — HOSPITAL ENCOUNTER (OUTPATIENT)
Dept: NUCLEAR MEDICINE | Facility: CLINIC | Age: 62
Setting detail: NUCLEAR MEDICINE
Discharge: HOME | End: 2017-07-12
Attending: SURGERY | Admitting: SURGERY
Payer: MEDICARE

## 2017-07-12 DIAGNOSIS — R10.11 ABDOMINAL PAIN, RIGHT UPPER QUADRANT: ICD-10-CM

## 2017-07-12 PROCEDURE — 25000128 H RX IP 250 OP 636: Performed by: SURGERY

## 2017-07-12 PROCEDURE — 78227 HEPATOBIL SYST IMAGE W/DRUG: CPT

## 2017-07-12 PROCEDURE — 34300033 ZZH RX 343: Performed by: SURGERY

## 2017-07-12 PROCEDURE — A9537 TC99M MEBROFENIN: HCPCS | Performed by: SURGERY

## 2017-07-12 RX ORDER — KIT FOR THE PREPARATION OF TECHNETIUM TC 99M MEBROFENIN 45 MG/10ML
6 INJECTION, POWDER, LYOPHILIZED, FOR SOLUTION INTRAVENOUS ONCE
Status: COMPLETED | OUTPATIENT
Start: 2017-07-12 | End: 2017-07-12

## 2017-07-12 RX ORDER — SINCALIDE 5 UG/5ML
0.02 INJECTION, POWDER, LYOPHILIZED, FOR SOLUTION INTRAVENOUS ONCE
Status: COMPLETED | OUTPATIENT
Start: 2017-07-12 | End: 2017-07-12

## 2017-07-12 RX ADMIN — MEBROFENIN 6 MILLICURIE: 45 INJECTION, POWDER, LYOPHILIZED, FOR SOLUTION INTRAVENOUS at 11:57

## 2017-07-12 RX ADMIN — SINCALIDE 2.7 MCG: 5 INJECTION, POWDER, LYOPHILIZED, FOR SOLUTION INTRAVENOUS at 13:16

## 2017-07-14 NOTE — PROGRESS NOTES
"Patient was notified of these results. HIDA scan is normal - cannot recommend cholecystectomy. She is interested in hernia repair and I discussed this with her. She reports ICU stay with \"bleeding ulcers\" but no current Rx for this. Discussed possibility of GOO due to ulcer disease (although CT shows no signs of this). Would be advisable to have GI evaluation - GI consult for possible endoscopy or at least UGI contrast study prior to having hernia repair for Sx of nausea and vomiting. She will contact her primary for referrals  Ismael Meza MD  7/14/2017 1:36 PM"

## 2017-07-26 ENCOUNTER — APPOINTMENT (OUTPATIENT)
Dept: CT IMAGING | Facility: CLINIC | Age: 62
End: 2017-07-26
Attending: EMERGENCY MEDICINE
Payer: MEDICARE

## 2017-07-26 ENCOUNTER — HOSPITAL ENCOUNTER (EMERGENCY)
Facility: CLINIC | Age: 62
Discharge: HOME | End: 2017-07-26
Attending: EMERGENCY MEDICINE | Admitting: EMERGENCY MEDICINE
Payer: MEDICARE

## 2017-07-26 VITALS
TEMPERATURE: 98.1 F | OXYGEN SATURATION: 100 % | RESPIRATION RATE: 18 BRPM | DIASTOLIC BLOOD PRESSURE: 88 MMHG | SYSTOLIC BLOOD PRESSURE: 128 MMHG | HEART RATE: 96 BPM

## 2017-07-26 DIAGNOSIS — E78.5 HYPERLIPIDEMIA LDL GOAL <100: ICD-10-CM

## 2017-07-26 DIAGNOSIS — N39.0 URINARY TRACT INFECTION WITHOUT HEMATURIA, SITE UNSPECIFIED: ICD-10-CM

## 2017-07-26 DIAGNOSIS — I10 HYPERTENSION GOAL BP (BLOOD PRESSURE) < 140/90: ICD-10-CM

## 2017-07-26 DIAGNOSIS — F32.1 MODERATE MAJOR DEPRESSION (H): Primary | ICD-10-CM

## 2017-07-26 DIAGNOSIS — R10.11 RUQ ABDOMINAL PAIN: ICD-10-CM
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
## 2017-07-26 LAB
ALBUMIN SERPL-MCNC: 3.5 G/DL (ref 3.4–5)
ALBUMIN UR-MCNC: ABNORMAL MG/DL
ALBUMIN UR-MCNC: NEGATIVE MG/DL
ALP SERPL-CCNC: 100 U/L (ref 40–150)
ALT SERPL W P-5'-P-CCNC: 29 U/L (ref 0–50)
ANION GAP SERPL CALCULATED.3IONS-SCNC: 5 MMOL/L (ref 3–14)
APPEARANCE UR: ABNORMAL
APPEARANCE UR: ABNORMAL
AST SERPL W P-5'-P-CCNC: 22 U/L (ref 0–45)
BASOPHILS # BLD AUTO: 0.1 10E9/L (ref 0–0.2)
BASOPHILS NFR BLD AUTO: 0.5 %
BILIRUB SERPL-MCNC: 0.4 MG/DL (ref 0.2–1.3)
BILIRUB UR QL STRIP: ABNORMAL
BILIRUB UR QL STRIP: NEGATIVE
BUN SERPL-MCNC: 17 MG/DL (ref 7–30)
CALCIUM SERPL-MCNC: 9.3 MG/DL (ref 8.5–10.1)
CHLORIDE SERPL-SCNC: 97 MMOL/L (ref 94–109)
CO2 SERPL-SCNC: 34 MMOL/L (ref 20–32)
COLOR UR AUTO: ABNORMAL
COLOR UR AUTO: YELLOW
CREAT SERPL-MCNC: 0.8 MG/DL (ref 0.52–1.04)
DIFFERENTIAL METHOD BLD: ABNORMAL
EOSINOPHIL # BLD AUTO: 0 10E9/L (ref 0–0.7)
EOSINOPHIL NFR BLD AUTO: 0.2 %
ERYTHROCYTE [DISTWIDTH] IN BLOOD BY AUTOMATED COUNT: 13.6 % (ref 10–15)
GFR SERPL CREATININE-BSD FRML MDRD: 73 ML/MIN/1.7M2
GLUCOSE SERPL-MCNC: 208 MG/DL (ref 70–99)
GLUCOSE UR STRIP-MCNC: ABNORMAL MG/DL
GLUCOSE UR STRIP-MCNC: NEGATIVE MG/DL
HCT VFR BLD AUTO: 45.4 % (ref 35–47)
HGB BLD-MCNC: 14.5 G/DL (ref 11.7–15.7)
HGB UR QL STRIP: ABNORMAL
HGB UR QL STRIP: NEGATIVE
IMM GRANULOCYTES # BLD: 0 10E9/L (ref 0–0.4)
IMM GRANULOCYTES NFR BLD: 0.2 %
INR PPP: 0.97 (ref 0.86–1.14)
KETONES UR STRIP-MCNC: ABNORMAL MG/DL
KETONES UR STRIP-MCNC: NEGATIVE MG/DL
LACTATE BLD-SCNC: 1.6 MMOL/L (ref 0.7–2.1)
LACTATE BLD-SCNC: 2.2 MMOL/L (ref 0.7–2.1)
LEUKOCYTE ESTERASE UR QL STRIP: ABNORMAL
LEUKOCYTE ESTERASE UR QL STRIP: ABNORMAL
LYMPHOCYTES # BLD AUTO: 0.9 10E9/L (ref 0.8–5.3)
LYMPHOCYTES NFR BLD AUTO: 8 %
MCH RBC QN AUTO: 26.7 PG (ref 26.5–33)
MCHC RBC AUTO-ENTMCNC: 31.9 G/DL (ref 31.5–36.5)
MCV RBC AUTO: 84 FL (ref 78–100)
MONOCYTES # BLD AUTO: 0.2 10E9/L (ref 0–1.3)
MONOCYTES NFR BLD AUTO: 1.5 %
NEUTROPHILS # BLD AUTO: 9.5 10E9/L (ref 1.6–8.3)
NEUTROPHILS NFR BLD AUTO: 89.6 %
NITRATE UR QL: ABNORMAL
NITRATE UR QL: NEGATIVE
NRBC # BLD AUTO: 0 10*3/UL
NRBC BLD AUTO-RTO: 0 /100
PH UR STRIP: 5 PH (ref 5–7)
PH UR STRIP: ABNORMAL PH (ref 5–7)
PLATELET # BLD AUTO: 211 10E9/L (ref 150–450)
POTASSIUM SERPL-SCNC: 4.7 MMOL/L (ref 3.4–5.3)
PROT SERPL-MCNC: 7.6 G/DL (ref 6.8–8.8)
RBC # BLD AUTO: 5.44 10E12/L (ref 3.8–5.2)
RBC #/AREA URNS AUTO: 5 /HPF (ref 0–2)
SODIUM SERPL-SCNC: 136 MMOL/L (ref 133–144)
SP GR UR STRIP: 1 (ref 1–1.03)
SP GR UR STRIP: ABNORMAL (ref 1–1.03)
TROPONIN I SERPL-MCNC: NORMAL UG/L (ref 0–0.04)
URN SPEC COLLECT METH UR: ABNORMAL
URN SPEC COLLECT METH UR: ABNORMAL
UROBILINOGEN UR STRIP-MCNC: 0 MG/DL (ref 0–2)
UROBILINOGEN UR STRIP-MCNC: ABNORMAL MG/DL (ref 0–2)
WBC # BLD AUTO: 10.6 10E9/L (ref 4–11)
WBC #/AREA URNS AUTO: 4 /HPF (ref 0–2)

## 2017-07-26 PROCEDURE — 25000128 H RX IP 250 OP 636: Performed by: EMERGENCY MEDICINE

## 2017-07-26 PROCEDURE — 84484 ASSAY OF TROPONIN QUANT: CPT | Performed by: EMERGENCY MEDICINE

## 2017-07-26 PROCEDURE — 85610 PROTHROMBIN TIME: CPT | Performed by: EMERGENCY MEDICINE

## 2017-07-26 PROCEDURE — 70450 CT HEAD/BRAIN W/O DYE: CPT

## 2017-07-26 PROCEDURE — 85025 COMPLETE CBC W/AUTO DIFF WBC: CPT | Performed by: EMERGENCY MEDICINE

## 2017-07-26 PROCEDURE — 96365 THER/PROPH/DIAG IV INF INIT: CPT | Mod: 59

## 2017-07-26 PROCEDURE — 96376 TX/PRO/DX INJ SAME DRUG ADON: CPT

## 2017-07-26 PROCEDURE — 99285 EMERGENCY DEPT VISIT HI MDM: CPT | Mod: 25

## 2017-07-26 PROCEDURE — 70486 CT MAXILLOFACIAL W/O DYE: CPT

## 2017-07-26 PROCEDURE — 36415 COLL VENOUS BLD VENIPUNCTURE: CPT | Performed by: EMERGENCY MEDICINE

## 2017-07-26 PROCEDURE — 96366 THER/PROPH/DIAG IV INF ADDON: CPT

## 2017-07-26 PROCEDURE — 80053 COMPREHEN METABOLIC PANEL: CPT | Performed by: EMERGENCY MEDICINE

## 2017-07-26 PROCEDURE — 87086 URINE CULTURE/COLONY COUNT: CPT | Performed by: EMERGENCY MEDICINE

## 2017-07-26 PROCEDURE — 83605 ASSAY OF LACTIC ACID: CPT | Performed by: EMERGENCY MEDICINE

## 2017-07-26 PROCEDURE — 74177 CT ABD & PELVIS W/CONTRAST: CPT

## 2017-07-26 PROCEDURE — 96375 TX/PRO/DX INJ NEW DRUG ADDON: CPT

## 2017-07-26 PROCEDURE — 83605 ASSAY OF LACTIC ACID: CPT | Mod: 91 | Performed by: EMERGENCY MEDICINE

## 2017-07-26 PROCEDURE — 81001 URINALYSIS AUTO W/SCOPE: CPT | Performed by: EMERGENCY MEDICINE

## 2017-07-26 RX ORDER — HYDROMORPHONE HYDROCHLORIDE 1 MG/ML
0.5 INJECTION, SOLUTION INTRAMUSCULAR; INTRAVENOUS; SUBCUTANEOUS
Status: COMPLETED | OUTPATIENT
Start: 2017-07-26 | End: 2017-07-26

## 2017-07-26 RX ORDER — METOCLOPRAMIDE HYDROCHLORIDE 5 MG/ML
10 INJECTION INTRAMUSCULAR; INTRAVENOUS ONCE
Status: COMPLETED | OUTPATIENT
Start: 2017-07-26 | End: 2017-07-26

## 2017-07-26 RX ORDER — SODIUM CHLORIDE 9 MG/ML
1000 INJECTION, SOLUTION INTRAVENOUS CONTINUOUS
Status: DISCONTINUED | OUTPATIENT
Start: 2017-07-26 | End: 2017-07-27 | Stop reason: HOSPADM

## 2017-07-26 RX ORDER — ONDANSETRON 2 MG/ML
4 INJECTION INTRAMUSCULAR; INTRAVENOUS EVERY 30 MIN PRN
Status: DISCONTINUED | OUTPATIENT
Start: 2017-07-26 | End: 2017-07-27 | Stop reason: HOSPADM

## 2017-07-26 RX ORDER — CIPROFLOXACIN 500 MG/1
500 TABLET, FILM COATED ORAL 2 TIMES DAILY
Qty: 14 TABLET | Refills: 0 | Status: SHIPPED | OUTPATIENT
Start: 2017-07-26 | End: 2017-08-02

## 2017-07-26 RX ORDER — ONDANSETRON 4 MG/1
4 TABLET, ORALLY DISINTEGRATING ORAL EVERY 6 HOURS PRN
Qty: 10 TABLET | Refills: 0 | Status: SHIPPED | OUTPATIENT
Start: 2017-07-26 | End: 2017-07-29

## 2017-07-26 RX ORDER — CIPROFLOXACIN 2 MG/ML
400 INJECTION, SOLUTION INTRAVENOUS ONCE
Status: COMPLETED | OUTPATIENT
Start: 2017-07-26 | End: 2017-07-26

## 2017-07-26 RX ORDER — IOPAMIDOL 755 MG/ML
500 INJECTION, SOLUTION INTRAVASCULAR ONCE
Status: COMPLETED | OUTPATIENT
Start: 2017-07-26 | End: 2017-07-26

## 2017-07-26 RX ADMIN — SODIUM CHLORIDE 1000 ML: 9 INJECTION, SOLUTION INTRAVENOUS at 18:39

## 2017-07-26 RX ADMIN — Medication 0.5 MG: at 18:37

## 2017-07-26 RX ADMIN — Medication 0.5 MG: at 16:21

## 2017-07-26 RX ADMIN — ONDANSETRON 4 MG: 2 INJECTION INTRAMUSCULAR; INTRAVENOUS at 16:20

## 2017-07-26 RX ADMIN — ONDANSETRON 4 MG: 2 INJECTION INTRAMUSCULAR; INTRAVENOUS at 17:44

## 2017-07-26 RX ADMIN — CIPROFLOXACIN 400 MG: 2 INJECTION, SOLUTION INTRAVENOUS at 19:37

## 2017-07-26 RX ADMIN — SODIUM CHLORIDE 65 ML: 9 INJECTION, SOLUTION INTRAVENOUS at 18:17

## 2017-07-26 RX ADMIN — Medication 0.5 MG: at 17:44

## 2017-07-26 RX ADMIN — METOCLOPRAMIDE 10 MG: 5 INJECTION, SOLUTION INTRAMUSCULAR; INTRAVENOUS at 18:47

## 2017-07-26 RX ADMIN — SODIUM CHLORIDE 1000 ML: 9 INJECTION, SOLUTION INTRAVENOUS at 16:19

## 2017-07-26 RX ADMIN — IOPAMIDOL 100 ML: 755 INJECTION, SOLUTION INTRAVENOUS at 18:17

## 2017-07-26 ASSESSMENT — ENCOUNTER SYMPTOMS
NECK PAIN: 0
FEVER: 0
ABDOMINAL PAIN: 1
NAUSEA: 1
CONSTIPATION: 0
HEADACHES: 0
BACK PAIN: 1

## 2017-07-26 NOTE — ED PROVIDER NOTES
History     Chief Complaint:  Abdominal Pain    HPI   Bhavani White is a wheelchair bound  61 year old female with a history of chronic pain problems who presents for evaluation after a fall, and with abdominal pain. Six days ago, the patient had a mechanical fall at home. She was sitting on the toilet when she leaned too far forward, lost her balance, and hit the wall in front of her and fell on her right side. Patient denies LOC. Her  was home and called EMS. EMS helped the patient get up and into her wheelchair. They did not bring her in for evaluation. The next day, she developed bilateral black eyes, facial pain, and back pain on her right side down to her bottom. She has been able to transfer per baseline.  No prescription at a graduation, no daily alcohol use or over-the-counter medications like NSAIDs.    Today, the patient woke up with RUQ abdominal pain with associated nausea. She has been unable to take her daily Methadone secondary to her nausea. Upon chart review, the patient was seen at the end of last month for RUQ abdominal pain, which she stated that she had for 18 months. At that time she had an ultrasound performed, and a scheduled cholecystectomy for gallbladder related pain was discussed.     Upon review of symptoms she endorses alleviated constipation. She denies headache, neck pain, vision changes, numbness, weakness, fever, or any new symptoms.     6/28  CT abdomen pelvis with contrast  1. No acute appearing abnormality.  2. Bilateral fat-containing hernias in the anterior abdominal wall of the pelvis as well as broad-based ventral protrusion of the anterior pelvic wall as described. There is no bowel in these hernias and no CT  evidence for incarcerated hernia.    7/12 Nuclear medicine hepatobiliary scan with gallbladder ejection fraction  Normal hepatobiliary scan without evidence for acute cholecystitis. Gallbladder ejection fraction is normal.    Allergies:  Povidone Iodine,  mild skin laceration  Toradol, nightmares     Medications:    Levothyroxine (synthroid/levothroid) 100 mcg tablet  Polyethylene glycol (miralax/glycolax) powder  Sulfamethoxazole-trimethoprim (bactrim ds/septra ds) 800-160 mg per tablet  Ondansetron (zofran odt) 4 mg odt tab  Methadone (dolophine) 10 mg tablet  Metformin (glucophage-xr) 500 mg 24 hr tablet  Trazodone (desyrel) 100 mg tablet  Lisinopril (prinivil/zestril) 2.5 mg tablet  Simvastatin (zocor) 20 mg tablet  Sulfamethoxazole-trimethoprim (bactrim ds,septra ds) 800-160 mg per tablet  Bupropion (wellbutrin xl) 300 mg 24 hr tablet  Diazepam (valium) 5 mg tablet  Senna-docusate (senokot-s;pericolace) 8.6-50 mg per tablet  Order for dme  Gabapentin (neurontin) 600 mg tablet  Nystatin (mycostatin) 344960 unit/gm powd  Albuterol (proair hfa, proventil hfa, ventolin hfa) 108 (90 base) mcg/act inhaler  Phenazopyridine hcl (pyridium po)  Duloxetine hcl (cymbalta po)  Tizanidine (zanaflex) 4 mg tablet  Aspirin 81 mg tablet  Miralax   Multivitamin or    Past Medical History:    Lumbar spinal stenosis   Fibromyalgia  HLD  Neuropathy   Obesity  Chronic narcotic dependence  UTI  Anxiety   Asthma   Depression   DM (diabetes mellitus) (H)   Frequent UTI   History of blood transfusion   History of ulcer disease   Hypertension   Hypothyroid   Iatrogenic Cushing's disease (H)   Morbid obesity (H)   MRSA (methicillin resistant staph aureus) culture positive   Osteoarthritis   Other chronic pain   Sleep apnea     Past Surgical History:    Abdomen surgery  Laminectomy, facetectomy, lumbar  Total knee arthroplasty, L  Discectomy, fusion cervical anterior three levels  Suprapubic catheter  Hernia repair  Hysterectomy  Bilateral cataract surgery  Left carpal tunnel surgery    Family History:    Mother: HTN, Bypass, Depression  Father: HTN, Ankylosin spondylitis, Colon cancer, Prostate cancer, Bladder cancer  Sister: Depression  Son: Depression    Social History:  Negative for  tobacco use.  Negative for alcohol use.  Positive for medical marijuana use  Marital Status:   [2]     Review of Systems   Constitutional: Negative for fever.   HENT:        Positive for facial pain   Eyes:        Positive for bilateral black eyes   Gastrointestinal: Positive for abdominal pain and nausea. Negative for constipation (alleivated ).   Musculoskeletal: Positive for back pain. Negative for neck pain.        Positive for right buttock pain   Neurological: Negative for headaches.   All other systems reviewed and are negative.      Physical Exam   First Vitals:  BP: (!) 172/144  Pulse: 96  Temp: 98.1  F (36.7  C)  Resp: 18  SpO2: 96 %      Physical Exam  Constitutional:  Well developed, well nourished, comfortable, no distress  Eyes:  PERRL, conjunctiva normal, EOMI  HENT:  Extensive contusions over face including bilateral periorbital contusion, Diffuse contusion over forehead and neck, No hemotympanum, No septal hematoma, no midface instability or focal tenderness  Respiratory:  No respiratory distress, normal breath sounds, no wheezing.   Cardiovascular:  RRR, no murmur.    GI: Morbidly obese.  Abdomen is nondistended, soft, moderate upper abdominal tenderness, no rebound or guarding, ventral hernias  Musculoskeletal:  No gross deformities of bilateral UE or LE noted. Otherwise able to range bilateral UE and LE without difficulty. C-spine: No tenderness. T-spine and L-spine are without midline ttp, stepoff, contusion or abrasion. Normal ROM, Pelvis is stable, meets NEXUS criteria  Integument:  Facial contusion as noted, no hematomas seen over rest of body   Neurologic: Alert & oriented x 3, CN 2-12 normal, normal motor function, normal sensory function, no focal deficits noted   Psychiatric:  Anxious      Emergency Department Course   Imaging:  Radiographic findings were communicated with the patient who voiced understanding of the findings.    CT Head w/o Contrast    (Results Pending)   CT  Maxillofacial w/o Contrast    (Results Pending)   CT Abdomen Pelvis w Contrast    (Results Pending)       Laboratory:  Lactic acid: 2.2 (H)    CBC: WBC: 10.6, HGB: 14.5, PLT: 211  CMP: Glucose 208 (H), Carbon dioxide: 34 (H), o/w WNL (Creatinine: 0.80)    Troponin: <0.015    INR: 0.97    Urinalysis: Pending     Interventions:  1619 NS 1 L IV  1620 Zofran 4 mg IV  1621 Dilaudid 0.5 mg IV    Emergency Department Course:  Nursing notes and vitals reviewed. I performed an exam of the patient as documented above.     The above workup was undertaken.      Impression & Plan    Medical Decision Makin-year-old female presenting after a fall and also with recurrent upper abdominal pain. This was clearly a mechanical fall, not syncope.  There were no prodromal symptoms so I doubt stroke, cardiac arrhythmia or other serious etiology.  Detailed exam shows evidence of localized facial trauma.  Although she is not a high risk due to anticoagulation, based on these findings I ordered imaging studies which are pending.    She also presents with right upper quadrant abdominal pain.  The differential diagnosis is broad and includes:  Appendicitis, cholecystitis, peptic ulcer disease, diverticulitis, bowel obstruction, ischemia, pancreatitis, GERD, amongst others.  Labs are notable only for a mildly elevated lactic acid; appears likely nonspecific but will recheck after hydration.  Urinalysis pending.  CT is pending.     Disposition will depend on results of studies including UA, repeat lactic acid, CT imaging.  If these were unremarkable, would anticipate discharge home. Follow up with surgery as previously discussed with them for further evaluation of her known chronic recurrent pain thought to be due to gallbladder sludge. Pt is signed out to my partner .    Diagnosis:    ICD-10-CM    1. Fall, initial encounter W19.XXXA    2. RUQ abdominal pain R10.11        Disposition:  Pending    I, Amanda Nguyễn, am serving as a  scribe on 7/26/2017 at 3:31 PM to personally document services performed by Zahra Alvarez MD based on my observations and the provider's statements to me.     Amanda Nguyễn  7/26/2017   [facility] EMERGENCY DEPARTMENT       Zahra Alvarez MD  07/26/17 1822

## 2017-07-26 NOTE — ED NOTES
Bed: ED18  Expected date: 7/26/17  Expected time: 3:05 PM  Means of arrival: Ambulance  Comments:  [id number]  62yo abd pain

## 2017-07-26 NOTE — TELEPHONE ENCOUNTER
Bupropion XL tabs        Last Written Prescription Date: 10/05/16  Last Fill Quantity: ?; # refills: ?  Last Office Visit with [facility], [facility] or  Health prescribing provider:  06/01/17 Dr. Dueñas        Last PHQ-9 score on record=   PHQ-9 SCORE 6/1/2017   Total Score -   Total Score 15       Lab Results   Component Value Date    AST 16 11/18/2016     Lab Results   Component Value Date    ALT 18 11/18/2016     Simvastatin tab      Last Written Prescription Date: 02/01/17  Last Fill Quantity: 90, # refills: 3  Last Office Visit with [facility], [facility] or [facility] prescribing provider: 06/01/17 Dr. dueñas       Lab Results   Component Value Date    CHOL 147 11/18/2016     Lab Results   Component Value Date    HDL 48 11/18/2016     Lab Results   Component Value Date    LDL 64 11/18/2016     Lab Results   Component Value Date    TRIG 174 11/18/2016     Lab Results   Component Value Date    CHOLHDLRATIO 2.3 12/12/2014     Lisinopril tab       Last Written Prescription Date: 03/07/17  Last Fill Quantity: 90, # refills: 2  Last Office Visit with [facility], [facility] or [facility] prescribing provider: 06/01/17 Dr. Dueñas       Potassium   Date Value Ref Range Status   11/18/2016 4.5 3.4 - 5.3 mmol/L Final     Creatinine   Date Value Ref Range Status   11/18/2016 0.92 0.52 - 1.04 mg/dL Final     BP Readings from Last 3 Encounters:   06/26/17 120/80   06/01/17 137/76   11/18/16 136/86

## 2017-07-26 NOTE — ED NOTES
Pt presents to ED via EMS c/o abdominal pain. Pain is RUQ and epigastric. Ongoing for months but much worse this morning. Also had a fall in the bathroom, falling forehead, 7d ago w/ ongoing facial, tailbone, and back pain since. Significant bruising noted to forehead and eyes. Pt is A&O x4, ABCs intact.

## 2017-07-26 NOTE — ED AVS SNAPSHOT
[facility] Emergency Department    [address] [facility] [address]    Phone:  [phone]    Fax:  [phone]                                       Bhavani White   MRN: [id number]    Department:  [facility] Emergency Department   Date of Visit:  7/26/2017           Patient Information     Date Of Birth          1955        Your diagnoses for this visit were:     Fall, initial encounter     RUQ abdominal pain     Urinary tract infection without hematuria, site unspecified        You were seen by Zahra Alvarez MD and Thu Harvey MD.      Follow-up Information     Follow up with continue to follow-up with surgery for further work-up/management of your abdominal pain.        Follow up with Shawnee Dueñas DO.    Specialty:  Family Practice    Why:  As needed    Contact information:    [facility]  [id number] [place] [id number] [phone]          Follow up with [facility] Emergency Department.    Specialty:  EMERGENCY MEDICINE    Why:  As needed    Contact information:    [address] [facility] [address] [phone]        Discharge Instructions       Discharge Instructions  Urinary Tract Infection  You have urinary tract infection, or UTI. The urinary tract includes the kidneys (which make urine), ureters (the tubes that carry urine from the kidneys to the bladder), the bladder (which stores urine), and urethra (the tube that carries urine out of the bladder).  Urinary tract infections occur when bacteria travel up the urethra into the bladder. We suspect a UTI based on chemical and microscopic findings in your urine, but if there is a question about your findings, we will do a culture to see if bacteria grow. A urine culture takes several days. You should always follow-up with your primary physician to find out about results of your culture if one was done.   Return to the  Emergency Department if:    You have severe back pain.    You are vomiting so that you can t take your medicine, or have signs of dehydration (such as urinating less than 3 times per day).    You have fever over 101.5 degrees F.    You have significant confusion or are very weak, or feel very ill.    Your child seems much more ill, won t wake up, won t respond right, or is crying for a long time and won t calm down.    Your child is showing signs of dehydration, Signs of dehydration can be:  o Your infant has had no wet diapers in 4-5 hours.  o Your older child has not passed urine in 6-8 hours.  o Your infant or child starts to have dry mouth and lips, or no saliva or tears.    Follow-up with your doctor:     Children under 24 months need to be seen by their regular doctor within one week after a diagnosis of a UTI. It may be necessary to do some imaging tests to look at the child s kidney or bladder.    You should begin to feel better within 24 - 48 hours of starting your antibiotic.  If you do not, you need to be seen again.      Treatment:     You will be treated with an antibiotic to kill the bacteria. We have to make an educated guess as to which antibiotic will work for your infection. In most healthy people, we can guess right almost all of the time. Sometimes a culture is done to show which antibiotics will work. This usually takes 2-3 days. When the culture is done, we may have to contact you to put you on a different antibiotic.    Take a pain medication such as Tylenol  (acetaminophen), Advil  (ibuprofen), Nuprin  (ibuprofen), or Aleve  (naproxen). If you have been given a narcotic such as Vicodin  (hydrocodone with acetaminophen), Percocet  (oxycodone with acetaminophen), or codeine, do not drive for four hours after you have taken it. If the narcotic contains Tylenol  (acetaminophen), do not take Tylenol  with it. All narcotics will cause constipation, so eat a high fiber diet.      Pyridium   "(phenazopyridine) or Uristat  (phenazopyridine) is a prescription medication that numbs the bladder to reduce the burning pain of some UTIs.  The same medication is available in a non-prescription version called Azo-Standard  (phenazopyridine), Urodol  (phenazopyridine), or other brand names. This medication will change the color of the urine and tears (usually blue or orange). If you wear contacts, do not wear them while taking this medication as they may be stained by the medication.    Antibiotic Warning:     If you have been placed on antibiotics - watch for signs of allergic reaction.  These include rash, lip swelling, difficulty breathing, wheezing, and dizziness.  If you develop any of these symptoms, stop the antibiotic immediately and go to an emergency room or urgent care for evaluation.    Probiotics: If you have been given an antibiotic, you may want to also take a probiotic pill or eat yogurt with live cultures. Probiotics have \"good bacteria\" to help your intestines stay healthy. Studies have shown that probiotics help prevent diarrhea and other intestine problems (including C. diff infection) when you take antibiotics. You can buy these without a prescription in the pharmacy section of the store.   If you were given a prescription for medicine here today, be sure to read all of the information (including the package insert) that comes with your prescription.  This will include important information about the medicine, its side effects, and any warnings that you need to know about.  The pharmacist who fills the prescription can provide more information and answer questions you may have about the medicine.  If you have questions or concerns that the pharmacist cannot address, please call or return to the Emergency Department.     Remember that you can always come back to the Emergency Department if you are not able to see your regular doctor in the amount of time listed above, if you get any new " symptoms, or if there is anything that worries you.          *Abdominal Pain, Unknown Cause (Female)    The exact cause of your abdominal (stomach) pain is not certain. This does not mean that this is something to worry about, or the right tests were not done. Everyone likes to know the exact cause of the problem, but sometimes with abdominal pain, there is no clear-cut cause, and this could be a good thing. The good news is that your symptoms can be treated, and you will feel better.   Your condition does not seem serious now; however, sometimes the signs of a serious problem may take more time to appear. For this reason, it is important for you to watch for any new symptoms, problems, or worsening of your condition.  Over the next few days, the abdominal pain may come and go, or be continuous. Other common symptoms can include nausea and vomiting. Sometimes it can be difficult to tell if you feel nauseous, you may just feel bad and not associate that feeling with nausea. Constipation, diarrhea, and a fever may go along with the pain.  The pain may continue even if treated correctly over the following days. Depending on how things go, sometimes the cause can become clear and may require further or different treatment. Additional evaluations, medications, or tests may be needed.  Home care  Your health care provider may prescribe medications for pain, symptoms, or an infection.  Follow the health care provider's instructions for taking these medications.  General care    Rest until your next exam. No strenuous activities.    Try to find positions that ease discomfort. A small pillow placed on the abdomen may help relieve pain.    Something warm on your abdomen (such as a heating pad) may help, but be careful not to burn yourself.  Diet    Do not force yourself to eat, especially if having cramps, vomiting, or diarrhea.    Water is important so you do not get dehydrated. Soup may also be good. Sports drinks may also  help, especially if they are not too acidic. Make sure you don't drink sugary drinks as this can make things worse. Take liquids in small amounts. Do not guzzle them.    Caffeine sometimes makes the pain and cramping worse.    Avoid dairy products if you have vomiting or diarrhea.    Don't eat large amounts at a time. Wait a few minutes between bites.    Eat a diet low in fiber (called a low-residue diet). Foods allowed include refined breads, white rice, fruit and vegetable juices without pulp, tender meats. These foods will pass more easily through the intestine.    Avoid fried or fatty foods, dairy, alcohol and spicy foods until your symptoms go away.  Follow-up care  Follow up with your health care provider as instructed, or if your pain does not begin to improve in the next 24 hours.  When to seek medical care  Seek prompt medical care if any of the following occur:    Pain gets worse or moves to the right lower abdomen    New or worsening vomiting or diarrhea    Swelling of the abdomen    Unable to pass stool for more than three days    New fever over 101  F (38.3 C), or rising fever    Blood in vomit or bowel movements (dark red or black color)    Jaundice (yellow color of eyes and skin)    Weakness, dizziness    Chest, arm, back, neck or jaw pain    Unexpected vaginal bleeding or missed period  Call 911  Call emergency services if any of the following occur:    Trouble breathing    Confusion    Fainting or loss of consciousness    Rapid heart rate    Seizure    [id number] [employer] [facility], [address] [place] [address]. All rights reserved. This information is not intended as a substitute for professional medical care. Always follow your healthcare professional's instructions.              Future Appointments        Provider Department Dept Phone Center    7/31/2017 5:00 PM Yoandy Rios MD [facility] and [facility] [phone] [facility]      24 Hour  Appointment Hotline       To make an appointment at any [facility] (formerly [facility]), call [phone] ([phone]). If you don't have a family doctor or clinic, we will help you find one. [place] clinics are conveniently located to serve the needs of you and your family.             Review of your medicines      START taking        Dose / Directions Last dose taken    ciprofloxacin 500 MG tablet   Commonly known as:  CIPRO   Dose:  500 mg   Quantity:  14 tablet        Take 1 tablet (500 mg) by mouth 2 times daily for 7 days   Refills:  0          Our records show that you are taking the medicines listed below. If these are incorrect, please call your family doctor or clinic.        Dose / Directions Last dose taken    * albuterol 108 (90 BASE) MCG/ACT Inhaler   Commonly known as:  PROAIR HFA/PROVENTIL HFA/VENTOLIN HFA   Dose:  2 puff   Quantity:  1 Inhaler        Inhale 2 puffs into the lungs every 6 hours as needed for shortness of breath / dyspnea or wheezing   Refills:  0        * albuterol (2.5 MG/3ML) 0.083% neb solution   Dose:  1 vial   Quantity:  30 vial        Take 1 vial (2.5 mg) by nebulization every 4 hours as needed for shortness of breath / dyspnea or wheezing   Refills:  1        aspirin 81 MG tablet   Quantity:  30 tablet        Take by mouth daily   Refills:  0        blood glucose monitoring lancets   Quantity:  1 Box        Use to test blood sugar 1 times daily or as directed.   Refills:  prn        blood glucose monitoring meter device kit   Quantity:  1 kit        Use to test blood sugar 1 times daily or as directed.   Refills:  0        buPROPion 300 MG 24 hr tablet   Commonly known as:  WELLBUTRIN XL        TK 1 T PO QD   Refills:  1        CYMBALTA PO   Dose:  120 mg        Take 120 mg by mouth daily   Refills:  0        diazepam 5 MG tablet   Commonly known as:  VALIUM   Dose:  5 mg   Quantity:  30 tablet        Take 1 tablet (5 mg) by mouth every 8 hours as needed for anxiety or other (pain,  muscle spasm)   Refills:  0        gabapentin 600 MG tablet   Commonly known as:  NEURONTIN   Dose:  1200 mg        1,200 mg 3 times daily   Refills:  2        levothyroxine 100 MCG tablet   Commonly known as:  SYNTHROID/LEVOTHROID   Quantity:  90 tablet        TAKE 1 TABLET(100 MCG) BY MOUTH DAILY   Refills:  0        lisinopril 2.5 MG tablet   Commonly known as:  PRINIVIL/Zestril   Quantity:  90 tablet        TAKE 1 TABLET BY MOUTH EVERY DAY   Refills:  2        metFORMIN 500 MG 24 hr tablet   Commonly known as:  GLUCOPHAGE-XR   Quantity:  360 tablet        TAKE 2 TABLETS(1000 MG) BY MOUTH TWICE DAILY WITH MEALS   Refills:  0        methadone 10 MG tablet   Commonly known as:  DOLOPHINE   Quantity:  150 tablet        Take 2 pills TID   Refills:  0        * MIRALAX PO        1 capful daily prn   Refills:  0        * polyethylene glycol powder   Commonly known as:  MIRALAX/GLYCOLAX        TK 17 G PO PRN FOR CONSTIPATION   Refills:  2        MULTIVITAMIN PO        1 a day   Refills:  0        nystatin [id number] UNIT/GM Powd   Commonly known as:  MYCOSTATIN   Quantity:  30 g        Apply topically 3 times daily as needed   Refills:  1        ondansetron 4 MG ODT tab   Commonly known as:  ZOFRAN ODT   Dose:  4-8 mg   Quantity:  20 tablet        Take 1-2 tablets (4-8 mg) by mouth every 8 hours as needed for nausea   Refills:  1        * order for DME   Quantity:  1 each        Equipment being ordered: Hospital Bed, total electric (head, foot, and height adjustments), with any type side rails, with mattress   Refills:  0        * order for DME   Quantity:  1 each        Equipment being ordered: Motorized Wheelchair   Refills:  0        * order for DME   Quantity:  1 each        Equipment being ordered: Trapeze bar for hospital bed   Refills:  0        * order for DME   Quantity:  1 Package        Equipment being ordered: Nebulizer   Refills:  0        * order for DME   Quantity:  1 each        Equipment being ordered:  BIPAP.  15/5, Rate of 12, 2L O2 to keep sats 90-95%.   Refills:  0        PYRIDIUM PO   Dose:  2 tablet        Take 2 tablets by mouth 3 times daily as needed Dose unknown   Refills:  0        senna-docusate 8.6-50 MG per tablet   Commonly known as:  SENOKOT-S;PERICOLACE   Dose:  2 tablet   Quantity:  100 tablet        Take 2 tablets by mouth 2 times daily   Refills:  0        simvastatin 20 MG tablet   Commonly known as:  ZOCOR   Dose:  20 mg   Quantity:  90 tablet        Take 1 tablet (20 mg) by mouth At Bedtime   Refills:  3        * sulfamethoxazole-trimethoprim 800-160 MG per tablet   Commonly known as:  BACTRIM DS/SEPTRA DS   Dose:  1 tablet   Quantity:  14 tablet        Take 1 tablet by mouth 2 times daily   Refills:  0        * sulfamethoxazole-trimethoprim 800-160 MG per tablet   Commonly known as:  BACTRIM DS/SEPTRA DS   Dose:  1 tablet   Quantity:  20 tablet        Take 1 tablet by mouth 2 times daily   Refills:  0        tiZANidine 4 MG tablet   Commonly known as:  ZANAFLEX   Dose:  4 mg   Quantity:  90 tablet        Take 1 tablet (4 mg) by mouth 3 times daily   Refills:  0        traZODone 100 MG tablet   Commonly known as:  DESYREL   Dose:  100 mg   Quantity:  90 tablet        Take 1 tablet (100 mg) by mouth At Bedtime   Refills:  1        * Notice:  This list has 11 medication(s) that are the same as other medications prescribed for you. Read the directions carefully, and ask your doctor or other care provider to review them with you.            Prescriptions were sent or printed at these locations (1 Prescription)                   Other Prescriptions                Printed at Department/Unit printer (1 of 1)         ciprofloxacin (CIPRO) 500 MG tablet                Procedures and tests performed during your visit     Procedure/Test Number of Times Performed    CBC with platelets differential 1    CT Abdomen Pelvis w Contrast 1    CT Head w/o Contrast 1    CT Maxillofacial w/o Contrast 1     Comprehensive metabolic panel 1    INR 1    Lactic acid whole blood 2    Troponin I 1    UA reflex to Microscopic and Culture 2      Orders Needing Specimen Collection     None      Pending Results     No orders found from 7/24/2017 to 7/27/2017.            Pending Culture Results     No orders found from 7/24/2017 to 7/27/2017.            Pending Results Instructions     If you had any lab results that were not finalized at the time of your Discharge, you can call the ED Lab Result RN at [phone]. You will be contacted by this team for any positive Lab results or changes in treatment. The nurses are available 7 days a week from 10A to 6:30P.  You can leave a message 24 hours per day and they will return your call.        Test Results From Your Hospital Stay        7/26/2017  4:24 PM      Component Results     Component Value Ref Range & Units Status    WBC 10.6 4.0 - 11.0 10e9/L Final    RBC Count 5.44 (H) 3.8 - 5.2 10e12/L Final    Hemoglobin 14.5 11.7 - 15.7 g/dL Final    Hematocrit 45.4 35.0 - 47.0 % Final    MCV 84 78 - 100 fl Final    MCH 26.7 26.5 - 33.0 pg Final    MCHC 31.9 31.5 - 36.5 g/dL Final    RDW 13.6 10.0 - 15.0 % Final    Platelet Count 211 150 - 450 10e9/L Final    Diff Method Automated Method  Final    % Neutrophils 89.6 % Final    % Lymphocytes 8.0 % Final    % Monocytes 1.5 % Final    % Eosinophils 0.2 % Final    % Basophils 0.5 % Final    % Immature Granulocytes 0.2 % Final    Nucleated RBCs 0 0 /100 Final    Absolute Neutrophil 9.5 (H) 1.6 - 8.3 10e9/L Final    Absolute Lymphocytes 0.9 0.8 - 5.3 10e9/L Final    Absolute Monocytes 0.2 0.0 - 1.3 10e9/L Final    Absolute Eosinophils 0.0 0.0 - 0.7 10e9/L Final    Absolute Basophils 0.1 0.0 - 0.2 10e9/L Final    Abs Immature Granulocytes 0.0 0 - 0.4 10e9/L Final    Absolute Nucleated RBC 0.0  Final         7/26/2017  4:43 PM      Component Results     Component Value Ref Range & Units Status    Sodium 136 133 - 144 mmol/L Final    Potassium  4.7 3.4 - 5.3 mmol/L Final    Chloride 97 94 - 109 mmol/L Final    Carbon Dioxide 34 (H) 20 - 32 mmol/L Final    Anion Gap 5 3 - 14 mmol/L Final    Glucose 208 (H) 70 - 99 mg/dL Final    Urea Nitrogen 17 7 - 30 mg/dL Final    Creatinine 0.80 0.52 - 1.04 mg/dL Final    GFR Estimate 73 >60 mL/min/1.7m2 Final    Non  GFR Calc    GFR Estimate If Black 88 >60 mL/min/1.7m2 Final    African American GFR Calc    Calcium 9.3 8.5 - 10.1 mg/dL Final    Bilirubin Total 0.4 0.2 - 1.3 mg/dL Final    Albumin 3.5 3.4 - 5.0 g/dL Final    Protein Total 7.6 6.8 - 8.8 g/dL Final    Alkaline Phosphatase 100 40 - 150 U/L Final    ALT 29 0 - 50 U/L Final    AST 22 0 - 45 U/L Final         7/26/2017  4:43 PM      Component Results     Component Value Ref Range & Units Status    Troponin I ES  0.000 - 0.045 ug/L Final    <0.015  The 99th percentile for upper reference range is 0.045 ug/L.  Troponin values in   the range of 0.045 - 0.120 ug/L may be associated with risks of adverse   clinical events.           7/26/2017  4:32 PM      Component Results     Component Value Ref Range & Units Status    INR 0.97 0.86 - 1.14 Final         7/26/2017  9:22 PM      Narrative     CT SCAN OF THE HEAD WITHOUT CONTRAST   7/26/2017 6:20 PM     HISTORY: Closed head injury. Head and facial injury.    TECHNIQUE:  Axial images of the head and coronal reformations without  IV contrast material. Radiation dose for this scan was reduced using  automated exposure control, adjustment of the mA and/or kV according  to patient size, or iterative reconstruction technique.    COMPARISON: None.    FINDINGS: There is no evidence of intracranial hemorrhage, mass, acute  infarct or anomaly. The ventricles are normal in size, shape and  configuration. The brain parenchyma and subarachnoid spaces are  normal.     The visualized portions of the sinuses and mastoids appear normal.  Soft tissue scalp swelling and edema at the vertex scalp without  underlying  fracture. There are bilateral intraocular lens implants.         Impression     IMPRESSION:  1. No evidence of acute intracranial hemorrhage, mass, or herniation.  2. Soft tissue scalp swelling and edema at the vertex without  underlying fracture.      LING AWAN MD         7/26/2017  9:22 PM      Narrative     CT SCAN OF THE FACE WITHOUT CONTRAST 7/26/2017 6:21 PM     HISTORY: Fall, facial injury.    TECHNIQUE: Radiation dose for this scan was reduced using automated  exposure control, adjustment of the mA and/or kV according to patient  size, or iterative reconstruction technique. Noncontrast axial scans  and coronal and sagittal reformations.     COMPARISON: None.    FINDINGS: There is soft tissue swelling near the vertex scalp without  underlying fracture. No facial bone fractures are identified. Bony  alignment is within normal limits.    Focal lucency in the right aspect of the maxilla is presumably from  recent dental extraction.    Focal 0.2 cm density in the right forehead scalp soft tissues may  represent a foreign body (series 2 image 89).    The visualized intracranial structures are unremarkable. There are  calcifications of the cavernous carotid arteries bilaterally. There  are bilateral intraocular lens implants. No mass identified within the  visualized soft tissues of the neck. There are degenerative changes of  the visualized cervical spine, particularly on the right at C2-C3. The  visualized paranasal sinuses are clear.        Impression     IMPRESSION:   1. No evidence of facial bone fracture.  2. Soft tissue swelling of the scalp near the vertex. Focal 0.2 cm  hyperdensity in the right frontal scalp soft tissues could represent a  foreign body.  3. Focal lucency in the right maxilla is likely from recent dental  extraction.      LING AWAN MD         7/26/2017  6:47 PM      Narrative     CT ABDOMEN AND PELVIS WITH CONTRAST  7/26/2017 6:19 PM    HISTORY: Abdominal pain after falling 6 days  ago.    COMPARISON: A CT on 6/28/2017.    TECHNIQUE: Routine transverse CT imaging of the abdomen and pelvis was  performed following the uneventful administration of 100mL Isovue-370  intravenous contrast. Radiation dose for this scan was reduced using  automated exposure control, adjustment of the mA and/or kV according  to patient size, or iterative reconstruction technique.    FINDINGS: There is mild atelectasis in the right lung base. The  visualized lungs are otherwise clear. The liver, spleen, and pancreas  remain normal. Again suspected is a small amount of layering sludge or  stones in the gallbladder. There has been no change in two simple  cysts of the left kidney. Also unchanged is a tiny nonobstructing  calculus in the posterior left kidney. The kidneys are otherwise  unremarkable. Again seen is a suprapubic bladder catheter. No enlarged  lymph node or other abnormal mass is demonstrated. No free fluid is  seen. No free intraperitoneal gas is identified. The gastrointestinal  tract is unremarkable. The appendix is not identified. There is no  additional evidence of appendicitis. There is mild calcification in  the vascular structures. There are degenerative changes in the spine  and hips. No fracture or acute osseous abnormality is seen. Again  demonstrated are small fat-containing hernias along the anterior  abdominal wall. I see no definite change since the previous  examination.        Impression     IMPRESSION: Unchanged examination with no acute abnormality seen.    KAITLIN RODRIGUEZ MD         7/26/2017  4:39 PM      Component Results     Component Value Ref Range & Units Status    Lactic Acid 2.2 (H) 0.7 - 2.1 mmol/L Final         7/26/2017  5:45 PM      Component Results     Component Value Ref Range & Units Status    Color Urine Canceled, Test credited   Test canceled by PCU/Clinic    Final    Appearance Urine Canceled, Test credited   Test canceled by PCU/Clinic    Final    Glucose Urine  Canceled, Test credited   Test canceled by PCU/Clinic   (A) NEG mg/dL Final    Bilirubin Urine Canceled, Test credited   Test canceled by PCU/Clinic   (A) NEG Final    Ketones Urine Canceled, Test credited   Test canceled by PCU/Clinic   (A) NEG mg/dL Final    Specific Gravity Urine Canceled, Test credited   Test canceled by PCU/Clinic   1.003 - 1.035 Final    Blood Urine Canceled, Test credited   Test canceled by PCU/Clinic   (A) NEG Final    pH Urine Canceled, Test credited   Test canceled by PCU/Clinic   5.0 - 7.0 pH Final    Protein Albumin Urine Canceled, Test credited   Test canceled by PCU/Clinic   (A) NEG mg/dL Final    Urobilinogen mg/dL Canceled, Test credited   Test canceled by PCU/Clinic   0.0 - 2.0 mg/dL Final    Nitrite Urine Canceled, Test credited   Test canceled by PCU/Clinic   (A) NEG Final    Leukocyte Esterase Urine Canceled, Test credited   Test canceled by PCU/Clinic   (A) NEG Final    Source Midstream Urine  Final         7/26/2017  7:12 PM      Component Results     Component Value Ref Range & Units Status    Color Urine Yellow  Final    Appearance Urine Slightly Cloudy  Final    Glucose Urine Negative NEG mg/dL Final    Bilirubin Urine Negative NEG Final    Ketones Urine Negative NEG mg/dL Final    Specific Gravity Urine 1.005 1.003 - 1.035 Final    Blood Urine Negative NEG Final    pH Urine 5.0 5.0 - 7.0 pH Final    Protein Albumin Urine Negative NEG mg/dL Final    Urobilinogen mg/dL 0.0 0.0 - 2.0 mg/dL Final    Nitrite Urine Negative NEG Final    Leukocyte Esterase Urine Trace (A) NEG Final    Source Catheterized Urine  Final    RBC Urine 5 (H) 0 - 2 /HPF Final    WBC Urine 4 (H) 0 - 2 /HPF Final         7/26/2017  8:04 PM      Component Results     Component Value Ref Range & Units Status    Lactic Acid 1.6 0.7 - 2.1 mmol/L Final                Clinical Quality Measure: Blood Pressure Screening     Your blood pressure was checked while you were in the emergency department today. The last  reading we obtained was  BP: 128/88 . Please read the guidelines below about what these numbers mean and what you should do about them.  If your systolic blood pressure (the top number) is less than 120 and your diastolic blood pressure (the bottom number) is less than 80, then your blood pressure is normal. There is nothing more that you need to do about it.  If your systolic blood pressure (the top number) is 120-139 or your diastolic blood pressure (the bottom number) is 80-89, your blood pressure may be higher than it should be. You should have your blood pressure rechecked within a year by a primary care provider.  If your systolic blood pressure (the top number) is 140 or greater or your diastolic blood pressure (the bottom number) is 90 or greater, you may have high blood pressure. High blood pressure is treatable, but if left untreated over time it can put you at risk for heart attack, stroke, or kidney failure. You should have your blood pressure rechecked by a primary care provider within the next 4 weeks.  If your provider in the emergency department today gave you specific instructions to follow-up with your doctor or provider even sooner than that, you should follow that instruction and not wait for up to 4 weeks for your follow-up visit.        Thank you for choosing [facility]       Thank you for choosing [facility] for your care. Our goal is always to provide you with excellent care. Hearing back from our patients is one way we can continue to improve our services. Please take a few minutes to complete the written survey that you may receive in the mail after you visit with us. Thank you!        [facility] Information     [facility] [employer] gives you secure access to your electronic health record. If you see a primary care provider, you can also send messages to your care team and make appointments. If you have questions, please call your primary care clinic.  If you do not have a primary care provider, please call  [phone] and they will assist you.        Care EveryWhere ID     This is your Care EveryWhere ID. This could be used by other organizations to access your [facility] medical records  [id number]        Equal Access to Services     JOSE LUIS GOMEZ : Noni Lujan, wachanda conner, qazoieta kaalmabryan españa, rowan gilbert. So [facility] [phone].    ATENCIÓN: Si habla español, tiene a shay disposición servicios gratuitos de asistencia lingüística. Llame al [phone].    We comply with applicable federal civil rights laws and [place] laws. We do not discriminate on the basis of race, color, national origin, age, disability sex, sexual orientation or gender identity.            After Visit Summary       This is your record. Keep this with you and show to your community pharmacist(s) and doctor(s) at your next visit.

## 2017-07-26 NOTE — ED AVS SNAPSHOT
[facility] Emergency Department    [address]    [facility] [address]    Phone:  [phone]    Fax:  [phone]                                       Bhavani White   MRN: [id number]    Department:  [facility] Emergency Department   Date of Visit:  7/26/2017           After Visit Summary Signature Page     I have received my discharge instructions, and my questions have been answered. I have discussed any challenges I see with this plan with the nurse or doctor.    ..........................................................................................................................................  Patient/Patient Representative Signature      ..........................................................................................................................................  Patient Representative Print Name and Relationship to Patient    ..................................................               ................................................  Date                                            Time    ..........................................................................................................................................  Reviewed by Signature/Title    ...................................................              ..............................................  Date                                                            Time

## 2017-07-27 ENCOUNTER — PRE VISIT (OUTPATIENT)
Dept: UROLOGY | Facility: CLINIC | Age: 62
End: 2017-07-27

## 2017-07-27 DIAGNOSIS — N31.9 NEUROGENIC BLADDER: Primary | ICD-10-CM

## 2017-07-27 NOTE — TELEPHONE ENCOUNTER
Patient with history of NGB coming in for leakage. Renal US needed, recent CMP available. Orders placed for [facility] and task sent to scheduling to help set this up.

## 2017-07-27 NOTE — DISCHARGE INSTRUCTIONS
Discharge Instructions  Urinary Tract Infection  You have urinary tract infection, or UTI. The urinary tract includes the kidneys (which make urine), ureters (the tubes that carry urine from the kidneys to the bladder), the bladder (which stores urine), and urethra (the tube that carries urine out of the bladder).  Urinary tract infections occur when bacteria travel up the urethra into the bladder. We suspect a UTI based on chemical and microscopic findings in your urine, but if there is a question about your findings, we will do a culture to see if bacteria grow. A urine culture takes several days. You should always follow-up with your primary physician to find out about results of your culture if one was done.   Return to the Emergency Department if:    You have severe back pain.    You are vomiting so that you can t take your medicine, or have signs of dehydration (such as urinating less than 3 times per day).    You have fever over 101.5 degrees F.    You have significant confusion or are very weak, or feel very ill.    Your child seems much more ill, won t wake up, won t respond right, or is crying for a long time and won t calm down.    Your child is showing signs of dehydration, Signs of dehydration can be:  o Your infant has had no wet diapers in 4-5 hours.  o Your older child has not passed urine in 6-8 hours.  o Your infant or child starts to have dry mouth and lips, or no saliva or tears.    Follow-up with your doctor:     Children under 24 months need to be seen by their regular doctor within one week after a diagnosis of a UTI. It may be necessary to do some imaging tests to look at the child s kidney or bladder.    You should begin to feel better within 24 - 48 hours of starting your antibiotic.  If you do not, you need to be seen again.      Treatment:     You will be treated with an antibiotic to kill the bacteria. We have to make an educated guess as to which antibiotic will work for your infection.  "In most healthy people, we can guess right almost all of the time. Sometimes a culture is done to show which antibiotics will work. This usually takes 2-3 days. When the culture is done, we may have to contact you to put you on a different antibiotic.    Take a pain medication such as Tylenol  (acetaminophen), Advil  (ibuprofen), Nuprin  (ibuprofen), or Aleve  (naproxen). If you have been given a narcotic such as Vicodin  (hydrocodone with acetaminophen), Percocet  (oxycodone with acetaminophen), or codeine, do not drive for four hours after you have taken it. If the narcotic contains Tylenol  (acetaminophen), do not take Tylenol  with it. All narcotics will cause constipation, so eat a high fiber diet.      Pyridium  (phenazopyridine) or Uristat  (phenazopyridine) is a prescription medication that numbs the bladder to reduce the burning pain of some UTIs.  The same medication is available in a non-prescription version called Azo-Standard  (phenazopyridine), Urodol  (phenazopyridine), or other brand names. This medication will change the color of the urine and tears (usually blue or orange). If you wear contacts, do not wear them while taking this medication as they may be stained by the medication.    Antibiotic Warning:     If you have been placed on antibiotics - watch for signs of allergic reaction.  These include rash, lip swelling, difficulty breathing, wheezing, and dizziness.  If you develop any of these symptoms, stop the antibiotic immediately and go to an emergency room or urgent care for evaluation.    Probiotics: If you have been given an antibiotic, you may want to also take a probiotic pill or eat yogurt with live cultures. Probiotics have \"good bacteria\" to help your intestines stay healthy. Studies have shown that probiotics help prevent diarrhea and other intestine problems (including C. diff infection) when you take antibiotics. You can buy these without a prescription in the pharmacy section of " the store.   If you were given a prescription for medicine here today, be sure to read all of the information (including the package insert) that comes with your prescription.  This will include important information about the medicine, its side effects, and any warnings that you need to know about.  The pharmacist who fills the prescription can provide more information and answer questions you may have about the medicine.  If you have questions or concerns that the pharmacist cannot address, please call or return to the Emergency Department.     Remember that you can always come back to the Emergency Department if you are not able to see your regular doctor in the amount of time listed above, if you get any new symptoms, or if there is anything that worries you.          *Abdominal Pain, Unknown Cause (Female)    The exact cause of your abdominal (stomach) pain is not certain. This does not mean that this is something to worry about, or the right tests were not done. Everyone likes to know the exact cause of the problem, but sometimes with abdominal pain, there is no clear-cut cause, and this could be a good thing. The good news is that your symptoms can be treated, and you will feel better.   Your condition does not seem serious now; however, sometimes the signs of a serious problem may take more time to appear. For this reason, it is important for you to watch for any new symptoms, problems, or worsening of your condition.  Over the next few days, the abdominal pain may come and go, or be continuous. Other common symptoms can include nausea and vomiting. Sometimes it can be difficult to tell if you feel nauseous, you may just feel bad and not associate that feeling with nausea. Constipation, diarrhea, and a fever may go along with the pain.  The pain may continue even if treated correctly over the following days. Depending on how things go, sometimes the cause can become clear and may require further or different  treatment. Additional evaluations, medications, or tests may be needed.  Home care  Your health care provider may prescribe medications for pain, symptoms, or an infection.  Follow the health care provider's instructions for taking these medications.  General care    Rest until your next exam. No strenuous activities.    Try to find positions that ease discomfort. A small pillow placed on the abdomen may help relieve pain.    Something warm on your abdomen (such as a heating pad) may help, but be careful not to burn yourself.  Diet    Do not force yourself to eat, especially if having cramps, vomiting, or diarrhea.    Water is important so you do not get dehydrated. Soup may also be good. Sports drinks may also help, especially if they are not too acidic. Make sure you don't drink sugary drinks as this can make things worse. Take liquids in small amounts. Do not guzzle them.    Caffeine sometimes makes the pain and cramping worse.    Avoid dairy products if you have vomiting or diarrhea.    Don't eat large amounts at a time. Wait a few minutes between bites.    Eat a diet low in fiber (called a low-residue diet). Foods allowed include refined breads, white rice, fruit and vegetable juices without pulp, tender meats. These foods will pass more easily through the intestine.    Avoid fried or fatty foods, dairy, alcohol and spicy foods until your symptoms go away.  Follow-up care  Follow up with your health care provider as instructed, or if your pain does not begin to improve in the next 24 hours.  When to seek medical care  Seek prompt medical care if any of the following occur:    Pain gets worse or moves to the right lower abdomen    New or worsening vomiting or diarrhea    Swelling of the abdomen    Unable to pass stool for more than three days    New fever over 101  F (38.3 C), or rising fever    Blood in vomit or bowel movements (dark red or black color)    Jaundice (yellow color of eyes and skin)    Weakness,  dizziness    Chest, arm, back, neck or jaw pain    Unexpected vaginal bleeding or missed period  Call 911  Call emergency services if any of the following occur:    Trouble breathing    Confusion    Fainting or loss of consciousness    Rapid heart rate    Seizure    [id number] [facility], [address] [place] [address], [place] [address]. All rights reserved. This information is not intended as a substitute for professional medical care. Always follow your healthcare professional's instructions.

## 2017-07-28 ENCOUNTER — TELEPHONE (OUTPATIENT)
Dept: FAMILY MEDICINE | Facility: CLINIC | Age: 62
End: 2017-07-28

## 2017-07-28 ENCOUNTER — TELEPHONE (OUTPATIENT)
Dept: UROLOGY | Facility: CLINIC | Age: 62
End: 2017-07-28

## 2017-07-28 LAB
BACTERIA SPEC CULT: ABNORMAL
Lab: ABNORMAL
MICRO REPORT STATUS: ABNORMAL
SPECIMEN SOURCE: ABNORMAL

## 2017-07-28 NOTE — TELEPHONE ENCOUNTER
Yisel with [facility] calling,    FYI- Pt. fell again yesterday; from wheelchair. Paramedics came and patient did not go to the ER. Not sure if she refused to go.     [facility], Requesting PRN nursing visit today to evaluate     Informed approved per standing orders. HC staff will fax these orders for MD signature.    Dot MARS RN, BSN, PHN  [place] [facility]

## 2017-07-28 NOTE — TELEPHONE ENCOUNTER
----- Message from Samantha Covington sent at 7/28/2017 12:15 PM CDT -----  Regarding: RE: renal US  Able to confirm with patient, 4pm US  ----- Message -----     From: Sangeeta Silver LPN     Sent: 7/27/2017   7:12 AM       To: [facility]  Subject: renal US                                         Hi!    This patient needs a renal US prior to seeing Dr. Rios on Monday.    Thank you!    -Sangeeta Silver LPN

## 2017-07-29 RX ORDER — SIMVASTATIN 20 MG
20 TABLET ORAL AT BEDTIME
Qty: 90 TABLET | Refills: 0 | Status: SHIPPED | OUTPATIENT
Start: 2017-07-29 | End: 2017-09-16

## 2017-07-29 RX ORDER — LISINOPRIL 2.5 MG/1
2.5 TABLET ORAL DAILY
Qty: 90 TABLET | Refills: 0 | Status: SHIPPED | OUTPATIENT
Start: 2017-07-29 | End: 2017-09-16

## 2017-07-31 ENCOUNTER — TELEPHONE (OUTPATIENT)
Dept: FAMILY MEDICINE | Facility: CLINIC | Age: 62
End: 2017-07-31

## 2017-07-31 ENCOUNTER — ALLIED HEALTH/NURSE VISIT (OUTPATIENT)
Dept: UROLOGY | Facility: CLINIC | Age: 62
End: 2017-07-31

## 2017-07-31 ENCOUNTER — OFFICE VISIT (OUTPATIENT)
Dept: UROLOGY | Facility: CLINIC | Age: 62
End: 2017-07-31

## 2017-07-31 VITALS
BODY MASS INDEX: 45.99 KG/M2 | WEIGHT: 293 LBS | SYSTOLIC BLOOD PRESSURE: 168 MMHG | DIASTOLIC BLOOD PRESSURE: 92 MMHG | HEART RATE: 88 BPM | HEIGHT: 67 IN

## 2017-07-31 DIAGNOSIS — N31.9 NEUROGENIC BLADDER: Primary | ICD-10-CM

## 2017-07-31 RX ORDER — BUPROPION HYDROCHLORIDE 300 MG/1
300 TABLET ORAL EVERY MORNING
Qty: 90 TABLET | Refills: 1 | Status: SHIPPED | OUTPATIENT
Start: 2017-07-31 | End: 2017-12-14

## 2017-07-31 RX ORDER — CEFAZOLIN SODIUM 1 G/3ML
1 INJECTION, POWDER, FOR SOLUTION INTRAMUSCULAR; INTRAVENOUS SEE ADMIN INSTRUCTIONS
Status: CANCELLED | OUTPATIENT
Start: 2017-07-31

## 2017-07-31 RX ORDER — CEFAZOLIN SODIUM 1 G/50ML
3 SOLUTION INTRAVENOUS
Status: CANCELLED | OUTPATIENT
Start: 2017-07-31

## 2017-07-31 ASSESSMENT — PAIN SCALES - GENERAL: PAINLEVEL: NO PAIN (0)

## 2017-07-31 NOTE — MR AVS SNAPSHOT
"              After Visit Summary   7/31/2017    Bhavani White    MRN: [id number]           Patient Information     Date Of Birth          1955        Visit Information        Provider Department      7/31/2017 5:00 PM Yoandy Rios MD [facility]        Today's Diagnoses     Neurogenic bladder    -  1       Follow-ups after your visit        Who to contact     Please call your clinic at [phone] to:    Ask questions about your health    Make or cancel appointments    Discuss your medicines    Learn about your test results    Speak to your doctor   If you have compliments or concerns about an experience at your clinic, or if you wish to file a complaint, please contact [facility] Patient Relations at [phone] or email us at [email or web address]         Additional Information About Your Visit        MyChart Information     [employer] gives you secure access to your electronic health record. If you see a primary care provider, you can also send messages to your care team and make appointments. If you have questions, please call your primary care clinic.  If you do not have a primary care provider, please call [phone] and they will assist you.      Live[employer] is an electronic gateway that provides easy, online access to your medical records. With [employer], you can request a clinic appointment, read your test results, renew a prescription or communicate with your care team.     To access your existing account, please contact your [facility] or call [phone] for assistance.        Care EveryWhere ID     This is your Care EveryWhere ID. This could be used by other organizations to access your [facility] medical records  [id number]        Your Vitals Were     Pulse Height BMI (Body Mass Index)             88 1.702 m (5' 7\") 46.67 kg/m2          Blood Pressure from Last 3 Encounters: "   07/31/17 (!) 168/92   07/26/17 128/88   06/26/17 120/80    Weight from Last 3 Encounters:   07/31/17 135.2 kg (298 lb)   06/26/17 135.2 kg (298 lb)   06/01/17 135.2 kg (298 lb)              We Performed the Following     Ambar-Operative Worksheet  (Urology General)          Today's Medication Changes          These changes are accurate as of: 7/31/17  6:41 PM.  If you have any questions, ask your nurse or doctor.               These medicines have changed or have updated prescriptions.        Dose/Directions    MIRALAX PO   This may have changed:  Another medication with the same name was removed. Continue taking this medication, and follow the directions you see here.   Changed by:  Chay Grijalva MD        1 capful daily prn   Refills:  0         Stop taking these medicines if you haven't already. Please contact your care team if you have questions.     sulfamethoxazole-trimethoprim 800-160 MG per tablet   Commonly known as:  BACTRIM DS/SEPTRA DS   Stopped by:  Yoandy Rios MD                    Primary Care Provider Office Phone # Fax #    Shawnee Dueñas [phone]       [address] [address]        Equal Access to Services     SANGEETA GOMEZ AH: Hadii bert pendleton hadasho Soomaali, waaxda luqadaha, qaybta kaalmada adeegyada, waxay rico haysai gilbert. So [facility] [phone].    ATENCIÓN: Si habla español, tiene a shay disposición servicios gratuitos de asistencia lingüística. Llame al [phone].    We comply with applicable federal civil rights laws and [place] laws. We do not discriminate on the basis of race, color, national origin, age, disability sex, sexual orientation or gender identity.            Thank you!     Thank you for choosing [facility]  for your care. Our goal is always to provide you with excellent care. Hearing back from our patients is one way we can  continue to improve our services. Please take a few minutes to complete the written survey that you may receive in the mail after your visit with us. Thank you!             Your Updated Medication List - Protect others around you: Learn how to safely use, store and throw away your medicines at www.disposemymeds.org.          This list is accurate as of: 7/31/17  6:41 PM.  Always use your most recent med list.                   Brand Name Dispense Instructions for use Diagnosis    * albuterol 108 (90 BASE) MCG/ACT Inhaler    PROAIR HFA/PROVENTIL HFA/VENTOLIN HFA    1 Inhaler    Inhale 2 puffs into the lungs every 6 hours as needed for shortness of breath / dyspnea or wheezing    Moderate persistent asthma with acute exacerbation       * albuterol (2.5 MG/3ML) 0.083% neb solution     30 vial    Take 1 vial (2.5 mg) by nebulization every 4 hours as needed for shortness of breath / dyspnea or wheezing    Moderate persistent asthma with acute exacerbation       aspirin 81 MG tablet     30 tablet    Take by mouth daily    Type 2 diabetes, HbA1c goal < 7% (H)       blood glucose monitoring lancets     1 Box    Use to test blood sugar 1 times daily or as directed.    Type 2 diabetes mellitus with diabetic nephropathy (H)       blood glucose monitoring meter device kit     1 kit    Use to test blood sugar 1 times daily or as directed.    Type 2 diabetes mellitus with diabetic nephropathy (H)       buPROPion 300 MG 24 hr tablet    WELLBUTRIN XL    90 tablet    Take 1 tablet (300 mg) by mouth every morning    Moderate major depression (H)       ciprofloxacin 500 MG tablet    CIPRO    14 tablet    Take 1 tablet (500 mg) by mouth 2 times daily for 7 days        CYMBALTA PO      Take 120 mg by mouth daily        diazepam 5 MG tablet    VALIUM    30 tablet    Take 1 tablet (5 mg) by mouth every 8 hours as needed for anxiety or other (pain, muscle spasm)    Acute respiratory failure with hypoxia and hypercapnia (H)       gabapentin  600 MG tablet    NEURONTIN     1,200 mg 3 times daily        levothyroxine 100 MCG tablet    SYNTHROID/LEVOTHROID    90 tablet    TAKE 1 TABLET(100 MCG) BY MOUTH DAILY    Hypothyroidism due to acquired atrophy of thyroid       lisinopril 2.5 MG tablet    PRINIVIL/Zestril    90 tablet    Take 1 tablet (2.5 mg) by mouth daily    Hypertension goal BP (blood pressure) < 140/90       metFORMIN 500 MG 24 hr tablet    GLUCOPHAGE-XR    360 tablet    TAKE 2 TABLETS(1000 MG) BY MOUTH TWICE DAILY WITH MEALS    Type 2 diabetes mellitus with diabetic nephropathy (H)       methadone 10 MG tablet    DOLOPHINE    150 tablet    Take 2 pills TID    Acute respiratory failure with hypoxia and hypercapnia (H)       MIRALAX PO      1 capful daily prn        MULTIVITAMIN PO      1 a day        nystatin 410423 UNIT/GM Powd    MYCOSTATIN    30 g    Apply topically 3 times daily as needed    Candidiasis of skin       ondansetron 4 MG ODT tab    ZOFRAN ODT    20 tablet    Take 1-2 tablets (4-8 mg) by mouth every 8 hours as needed for nausea    Nausea and vomiting, intractability of vomiting not specified, unspecified vomiting type       * order for DME     1 each    Equipment being ordered: Hospital Bed, total electric (head, foot, and height adjustments), with any type side rails, with mattress    Wheelchair bound, Ankylosing spondylitis (H), Spinal stenosis in cervical region, Lumbar spinal stenosis, Fibromyalgia, Chronic low back pain       * order for DME     1 each    Equipment being ordered: Motorized Wheelchair    Wheelchair bound, Ankylosing spondylitis (H), Spinal stenosis in cervical region, Lumbar spinal stenosis, Fibromyalgia, Chronic low back pain       * order for DME     1 each    Equipment being ordered: Trapeze bar for hospital bed    Wheelchair bound, Ankylosing spondylitis (H), Spinal stenosis in cervical region, Lumbar spinal stenosis, Fibromyalgia, Chronic low back pain       * order for DME     1 Package    Equipment  being ordered: Nebulizer    Moderate persistent asthma with acute exacerbation       * order for DME     1 each    Equipment being ordered: BIPAP.  15/5, Rate of 12, 2L O2 to keep sats 90-95%.    Acute respiratory failure with hypoxia and hypercapnia (H)       PYRIDIUM PO      Take 2 tablets by mouth 3 times daily as needed Dose unknown        senna-docusate 8.6-50 MG per tablet    SENOKOT-S;PERICOLACE    100 tablet    Take 2 tablets by mouth 2 times daily    Acute respiratory failure with hypoxia and hypercapnia (H)       simvastatin 20 MG tablet    ZOCOR    90 tablet    Take 1 tablet (20 mg) by mouth At Bedtime    Hyperlipidemia LDL goal <100       tiZANidine 4 MG tablet    ZANAFLEX    90 tablet    Take 1 tablet (4 mg) by mouth 3 times daily        traZODone 100 MG tablet    DESYREL    90 tablet    Take 1 tablet (100 mg) by mouth At Bedtime    Insomnia       * Notice:  This list has 7 medication(s) that are the same as other medications prescribed for you. Read the directions carefully, and ask your doctor or other care provider to review them with you.

## 2017-07-31 NOTE — PROGRESS NOTES
NEW CONSULT FOR NEUROGENIC BLADDER    Name: Bhavani White    MRN: [id number]   YOB: 1955  Accompanied at today's visit by:spouse                 Chief Complaint:   Neurogenic Bladder          History of Present Illness:   Bhavani White is a 61 year old female with PMH of DM2 with a history of neurogenic bladder secondary to spinal cord pathology (spinal stenosis?) in 2014-2203. Chart review reports back injury in 1981 s/p surgery. SPT placed by Dr. Batista in 2009. Patient is wheelchair bound for spinal cord issues and ankylosing spondylitis. New patient with us. Her main complaints today are bladder spasms and leakage pre urethra and around SPT.    Previous Bladder Surgeries:  Previous Bladder Augmentation: none  Catheterizable stoma: none  Anti-incontinence procedures: none  Botox injections: Once in 2013 at [facility] - denies benefits from this    Pertinent Medications:  Current Anticholinergics: Has tried multiple anticholinergics (ditropan reg and XL, myrbetriq, etc), noted no improvement in symptoms   Current Prophylactic antibiotics: In 2009 was on daily Bactrim - unclear when this was stopped  Intravesical gentamycin:  None  Intravesical oxybutinin: None    Catheterization History:  The patient wears an indwelling 24F suprapubic catheter and home RN changes it q4 weeks. She does not irrigate the bladder regularly. Prior to SPT placement in 2009 had chronic urethral nguyen.    Incontinence History:  She does leak from urethra and around SPT. Chronic leakage from urethra. She also experiences bladder spasms which causes severe pain - takes pyridium for this sensation.    Urinary Tract Infection History:  Treated with antibiotics for positive culture and non-specific symptoms: 0 times in the last year  Treated with antibiotics for positive culture plus symptoms of UTI: 8 times in the last year. Worse spasms, pain, incontinence. 30+ positive cultures with E. Coli, Klebsiella, pseudomonas over  past 5 years.    Bowel Movement History:  The patient has a bowel movement q2 days. Bowel regimen includes Miralax, Senna-S           Past Medical History:     Past Medical History:   Diagnosis Date     Anxiety      Asthma      Depression      DM (diabetes mellitus) (H)      Frequent UTI      History of blood transfusion      History of ulcer disease 2014    She notes from NSAIDs; nearly . Discussed a hospitalization at [facility] for this.     Hypertension      Hypothyroid      Iatrogenic Cushing's disease (H)     off prednisone now     Morbid obesity (H)      MRSA (methicillin resistant staph aureus) culture positive 2012    repeat cx 10/12/2012 no staph mentioned.     Osteoarthritis     multiple joings.     Other chronic pain      Sleep apnea     No longer uses cpap.            Past Surgical History:     Past Surgical History:   Procedure Laterality Date     ABDOMEN SURGERY       C LAMINECTOMY,FACETECTOMY,LUMBAR  1982     C TOTAL KNEE ARTHROPLASTY      left     DISCECTOMY, FUSION CERVICAL ANTERIOR THREE+ LEVELS, COMBINED Left 5/3/2016    Procedure: COMBINED DISCECTOMY, FUSION CERVICAL ANTERIOR THREE+ LEVELS;  Surgeon: Jasvir Torres MD;  Location: [facility] OR     GENITOURINARY SURGERY      suprapubic catheter     HERNIA REPAIR      double hernia     HYSTERECTOMY, PAP NO LONGER INDICATED      CELIA and large ovarian tumor removed     SURGICAL HISTORY OF -       left cataract surgery     SURGICAL HISTORY OF -       right cataract surgery and left laser revision     SURGICAL HISTORY OF -   2015    left carpal tunnel release     TONSILLECTOMY  1974            Social History:     Social History   Substance Use Topics     Smoking status: Never Smoker     Smokeless tobacco: Never Used     Alcohol use No            Family History:     Family History   Problem Relation Age of Onset     Depression Son      Hypertension Mother      HEART DISEASE Mother      bypass     Depression Mother       "Hypertension Father      Connective Tissue Disorder Father      ankylosing spondylitis     CANCER Father      colon; prostate     Other Cancer Father      bladder cancer     Depression Sister             Allergies:     Allergies   Allergen Reactions     Povidone Iodine      \"mild skin irritation\" per patient report     Toradol [Ketorolac] Other (See Comments)     Pt gets nightmares            Medications:     Current Outpatient Prescriptions   Medication Sig     buPROPion (WELLBUTRIN XL) 300 MG 24 hr tablet Take 1 tablet (300 mg) by mouth every morning     simvastatin (ZOCOR) 20 MG tablet Take 1 tablet (20 mg) by mouth At Bedtime     lisinopril (PRINIVIL/ZESTRIL) 2.5 MG tablet Take 1 tablet (2.5 mg) by mouth daily     ciprofloxacin (CIPRO) 500 MG tablet Take 1 tablet (500 mg) by mouth 2 times daily for 7 days     levothyroxine (SYNTHROID/LEVOTHROID) 100 MCG tablet TAKE 1 TABLET(100 MCG) BY MOUTH DAILY     ondansetron (ZOFRAN ODT) 4 MG ODT tab Take 1-2 tablets (4-8 mg) by mouth every 8 hours as needed for nausea     methadone (DOLOPHINE) 10 MG tablet Take 2 pills TID     metFORMIN (GLUCOPHAGE-XR) 500 MG 24 hr tablet TAKE 2 TABLETS(1000 MG) BY MOUTH TWICE DAILY WITH MEALS     traZODone (DESYREL) 100 MG tablet Take 1 tablet (100 mg) by mouth At Bedtime     diazepam (VALIUM) 5 MG tablet Take 1 tablet (5 mg) by mouth every 8 hours as needed for anxiety or other (pain, muscle spasm)     senna-docusate (SENOKOT-S;PERICOLACE) 8.6-50 MG per tablet Take 2 tablets by mouth 2 times daily     order for DME Equipment being ordered: BIPAP.   15/5, Rate of 12, 2L O2 to keep sats 90-95%.     gabapentin (NEURONTIN) 600 MG tablet 1,200 mg 3 times daily      nystatin (MYCOSTATIN) 763019 UNIT/GM POWD Apply topically 3 times daily as needed     albuterol (PROAIR HFA, PROVENTIL HFA, VENTOLIN HFA) 108 (90 BASE) MCG/ACT inhaler Inhale 2 puffs into the lungs every 6 hours as needed for shortness of breath / dyspnea or wheezing     order for " "DME Equipment being ordered: Nebulizer     albuterol (2.5 MG/3ML) 0.083% nebulizer solution Take 1 vial (2.5 mg) by nebulization every 4 hours as needed for shortness of breath / dyspnea or wheezing     order for DME Equipment being ordered: Hospital Bed, total electric (head, foot, and height adjustments), with any type side rails, with mattress     order for DME Equipment being ordered: Motorized Wheelchair     order for DME Equipment being ordered: Trapeze bar for hospital bed     Phenazopyridine HCl (PYRIDIUM PO) Take 2 tablets by mouth 3 times daily as needed Dose unknown     blood glucose monitoring (ACCU-CHEK FASTCLIX) lancets Use to test blood sugar 1 times daily or as directed.     blood glucose monitoring (ACCU-CHEK SP SMARTVIEW) meter device kit Use to test blood sugar 1 times daily or as directed.     DULoxetine HCl (CYMBALTA PO) Take 120 mg by mouth daily      tiZANidine (ZANAFLEX) 4 MG tablet Take 1 tablet (4 mg) by mouth 3 times daily     aspirin 81 MG tablet Take by mouth daily     MIRALAX OR 1 capful daily prn     MULTIVITAMIN OR 1 a day     No current facility-administered medications for this visit.              Review of Systems:    ROS: 14 point ROS neg other than the symptoms noted above in the HPI and PMH.          Physical Exam:   B/P: 168/92, T: Data Unavailable, P: 88, R: Data Unavailable  Estimated body mass index is 46.67 kg/(m^2) as calculated from the following:    Height as of this encounter: 1.702 m (5' 7\").    Weight as of this encounter: 135.2 kg (298 lb).  General: age-appropriate appearing female in NAD sitting in a wheelchair.    HEENT: Head AT/NC, EOMI, CN Grossly intact.  Resp: no respiratory distress  CV: heart rate regular  Lymph: No cervical, supraclavicular or axillary lymphadenopathy  Back: bony spine is non-tender, flanks are non-tender.  Abdomen: Degree of obesity is severe. Abdomen is soft and nontender. No organomegaly. Surgical scars include massive midline surgical " scar with large ventral hernia. SPT site is buried under rolls of fat. Site is clean.  LE: Edema is 1+             Data:    Imaging:  Renal/Bladder Ultrasound (Date = 7/31/2017):       Right hydronephrosis: none       Left hydronephrosis: none       Kidney stones:None  Bladder: Not visualized    Labs:  Creatinine (Date = 7/26/17): 0.8  Vitamin B12: (Date = ): None           Assessment and Plan:   61 year old female with neurogenic bladder secondary of unclear etiology - possibly due to spinal cord pathology/surgeries. However, looking back at records from 2009 they report her having an indwelling catheter due to immobility and bedbound status, unable to transfer. Now reporting leakage per urethra and SP site with bladder spasms.      - Plan for bladder botox in OR per urethra. Will be able to evaluate bladder, perform simple CMG and cystogram at that time    Patient was seen and examined with Dr. Gabriel Lynn MD  Urology Resident     =======================================================  As the attending surgeon I, Yoandy Rios, interviewed and examined the patient. The plan was developed between me and the patient. My findings and plan are as stated by the resident.    Yoandy Rios MD

## 2017-07-31 NOTE — TELEPHONE ENCOUNTER
Yisel calling from  Home Care-    Skilled Nursing  2x/1mo  1mo/1mo  2 prn    Verbal order given.  Will fax for MD signature.  Lakisha Marie RN

## 2017-07-31 NOTE — MR AVS SNAPSHOT
After Visit Summary   7/31/2017    Bhavani White    MRN: [id number]           Patient Information     Date Of Birth          1955        Visit Information        Provider Department      7/31/2017 6:00 PM Nurse,  Prostate Cancer [facility] and [facility]        Today's Diagnoses     Neurogenic bladder    -  1       Follow-ups after your visit        Your next 10 appointments already scheduled     Aug 23, 2017   Procedure with Yoandy Rios MD   [facility], [place], Same Day Surgery (--)    [address]  [facility] [place] [id number] [phone]              Who to contact     Please call your clinic at [phone] to:    Ask questions about your health    Make or cancel appointments    Discuss your medicines    Learn about your test results    Speak to your doctor   If you have compliments or concerns about an experience at your clinic, or if you wish to file a complaint, please contact [facility] Patient Relations at [phone] or email us at [email or web address]         Additional Information About Your Visit        MyChart Information     [employer] gives you secure access to your electronic health record. If you see a primary care provider, you can also send messages to your care team and make appointments. If you have questions, please call your primary care clinic.  If you do not have a primary care provider, please call [phone] and they will assist you.      [employer] is an electronic gateway that provides easy, online access to your medical records. With [employer], you can request a clinic appointment, read your test results, renew a prescription or communicate with your care team.     To access your existing account, please contact your [facility] Clinic or call [phone] for assistance.        Care EveryWhere ID     This is your Care EveryWhere ID. This could be used by other  organizations to access your [facility] medical records  [id number]         Blood Pressure from Last 3 Encounters:   07/31/17 (!) 168/92   07/26/17 128/88   06/26/17 120/80    Weight from Last 3 Encounters:   07/31/17 135.2 kg (298 lb)   06/26/17 135.2 kg (298 lb)   06/01/17 135.2 kg (298 lb)              Today, you had the following     No orders found for display         Today's Medication Changes          These changes are accurate as of: 7/31/17 11:59 PM.  If you have any questions, ask your nurse or doctor.               These medicines have changed or have updated prescriptions.        Dose/Directions    MIRALAX PO   This may have changed:  Another medication with the same name was removed. Continue taking this medication, and follow the directions you see here.   Changed by:  Chay Grijalva MD        1 capful daily prn   Refills:  0         Stop taking these medicines if you haven't already. Please contact your care team if you have questions.     sulfamethoxazole-trimethoprim 800-160 MG per tablet   Commonly known as:  BACTRIM DS/SEPTRA DS   Stopped by:  Yoandy Rios MD                    Primary Care Provider Office Phone # Fax #    Shawnee DueñasDO [phone]       [address] [address]        Equal Access to Services     SANGEETA GOMEZ : Hadii aad ku hadasho Soomaali, waaxda luqadaha, qaybta kaalmada adeegyada, rowan gilbert. So [facility] [phone].    ATENCIÓN: Si habla español, tiene a shay disposición servicios gratuitos de asistencia lingüística. Llame al [phone].    We comply with applicable federal civil rights laws and [place] laws. We do not discriminate on the basis of race, color, national origin, age, disability sex, sexual orientation or gender identity.            Thank you!     Thank you for choosing [facility] UROLOGY AND [facility] FOR PROSTATE AND UROLOGIC CANCERS  for your care. Our  goal is always to provide you with excellent care. Hearing back from our patients is one way we can continue to improve our services. Please take a few minutes to complete the written survey that you may receive in the mail after your visit with us. Thank you!             Your Updated Medication List - Protect others around you: Learn how to safely use, store and throw away your medicines at www.disposemymeds.org.          This list is accurate as of: 7/31/17 11:59 PM.  Always use your most recent med list.                   Brand Name Dispense Instructions for use Diagnosis    * albuterol 108 (90 BASE) MCG/ACT Inhaler    PROAIR HFA/PROVENTIL HFA/VENTOLIN HFA    1 Inhaler    Inhale 2 puffs into the lungs every 6 hours as needed for shortness of breath / dyspnea or wheezing    Moderate persistent asthma with acute exacerbation       * albuterol (2.5 MG/3ML) 0.083% neb solution     30 vial    Take 1 vial (2.5 mg) by nebulization every 4 hours as needed for shortness of breath / dyspnea or wheezing    Moderate persistent asthma with acute exacerbation       aspirin 81 MG tablet     30 tablet    Take by mouth daily    Type 2 diabetes, HbA1c goal < 7% (H)       blood glucose monitoring lancets     1 Box    Use to test blood sugar 1 times daily or as directed.    Type 2 diabetes mellitus with diabetic nephropathy (H)       blood glucose monitoring meter device kit     1 kit    Use to test blood sugar 1 times daily or as directed.    Type 2 diabetes mellitus with diabetic nephropathy (H)       buPROPion 300 MG 24 hr tablet    WELLBUTRIN XL    90 tablet    Take 1 tablet (300 mg) by mouth every morning    Moderate major depression (H)       ciprofloxacin 500 MG tablet    CIPRO    14 tablet    Take 1 tablet (500 mg) by mouth 2 times daily for 7 days        CYMBALTA PO      Take 120 mg by mouth daily        diazepam 5 MG tablet    VALIUM    30 tablet    Take 1 tablet (5 mg) by mouth every 8 hours as needed for anxiety or other  (pain, muscle spasm)    Acute respiratory failure with hypoxia and hypercapnia (H)       gabapentin 600 MG tablet    NEURONTIN     1,200 mg 3 times daily        levothyroxine 100 MCG tablet    SYNTHROID/LEVOTHROID    90 tablet    TAKE 1 TABLET(100 MCG) BY MOUTH DAILY    Hypothyroidism due to acquired atrophy of thyroid       lisinopril 2.5 MG tablet    PRINIVIL/Zestril    90 tablet    Take 1 tablet (2.5 mg) by mouth daily    Hypertension goal BP (blood pressure) < 140/90       metFORMIN 500 MG 24 hr tablet    GLUCOPHAGE-XR    360 tablet    TAKE 2 TABLETS(1000 MG) BY MOUTH TWICE DAILY WITH MEALS    Type 2 diabetes mellitus with diabetic nephropathy (H)       methadone 10 MG tablet    DOLOPHINE    150 tablet    Take 2 pills TID    Acute respiratory failure with hypoxia and hypercapnia (H)       MIRALAX PO      1 capful daily prn        MULTIVITAMIN PO      1 a day        nystatin 336154 UNIT/GM Powd    MYCOSTATIN    30 g    Apply topically 3 times daily as needed    Candidiasis of skin       ondansetron 4 MG ODT tab    ZOFRAN ODT    20 tablet    Take 1-2 tablets (4-8 mg) by mouth every 8 hours as needed for nausea    Nausea and vomiting, intractability of vomiting not specified, unspecified vomiting type       * order for DME     1 each    Equipment being ordered: Hospital Bed, total electric (head, foot, and height adjustments), with any type side rails, with mattress    Wheelchair bound, Ankylosing spondylitis (H), Spinal stenosis in cervical region, Lumbar spinal stenosis, Fibromyalgia, Chronic low back pain       * order for DME     1 each    Equipment being ordered: Motorized Wheelchair    Wheelchair bound, Ankylosing spondylitis (H), Spinal stenosis in cervical region, Lumbar spinal stenosis, Fibromyalgia, Chronic low back pain       * order for DME     1 each    Equipment being ordered: Trapeze bar for hospital bed    Wheelchair bound, Ankylosing spondylitis (H), Spinal stenosis in cervical region, Lumbar  spinal stenosis, Fibromyalgia, Chronic low back pain       * order for DME     1 Package    Equipment being ordered: Nebulizer    Moderate persistent asthma with acute exacerbation       * order for DME     1 each    Equipment being ordered: BIPAP.  15/5, Rate of 12, 2L O2 to keep sats 90-95%.    Acute respiratory failure with hypoxia and hypercapnia (H)       PYRIDIUM PO      Take 2 tablets by mouth 3 times daily as needed Dose unknown        senna-docusate 8.6-50 MG per tablet    SENOKOT-S;PERICOLACE    100 tablet    Take 2 tablets by mouth 2 times daily    Acute respiratory failure with hypoxia and hypercapnia (H)       simvastatin 20 MG tablet    ZOCOR    90 tablet    Take 1 tablet (20 mg) by mouth At Bedtime    Hyperlipidemia LDL goal <100       tiZANidine 4 MG tablet    ZANAFLEX    90 tablet    Take 1 tablet (4 mg) by mouth 3 times daily        traZODone 100 MG tablet    DESYREL    90 tablet    Take 1 tablet (100 mg) by mouth At Bedtime    Insomnia       * Notice:  This list has 7 medication(s) that are the same as other medications prescribed for you. Read the directions carefully, and ask your doctor or other care provider to review them with you.

## 2017-07-31 NOTE — LETTER
7/31/2017       RE: Bhavani White  [address]  [facility] [address]     Dear Colleague,    Thank you for referring your patient, Bhavani White, to the [facility] UROLOGY AND [facility] FOR PROSTATE AND UROLOGIC CANCERS [facility]. Please see a copy of my visit note below.    NEW CONSULT FOR NEUROGENIC BLADDER    Name: Bhavani White    MRN: [id number]   YOB: 1955  Accompanied at today's visit by:spouse                 Chief Complaint:   Neurogenic Bladder          History of Present Illness:   Bhavani White is a 61 year old female with PMH of DM2 with a history of neurogenic bladder secondary to spinal cord pathology (spinal stenosis?) in 1477-1856. Chart review reports back injury in 1981 s/p surgery. SPT placed by Dr. Batista in 2009. Patient is wheelchair bound for spinal cord issues and ankylosing spondylitis. New patient with us. Her main complaints today are bladder spasms and leakage pre urethra and around SPT.    Previous Bladder Surgeries:  Previous Bladder Augmentation: none  Catheterizable stoma: none  Anti-incontinence procedures: none  Botox injections: Once in 2013 at [facility] - denies benefits from this    Pertinent Medications:  Current Anticholinergics: Has tried multiple anticholinergics (ditropan reg and XL, myrbetriq, etc), noted no improvement in symptoms   Current Prophylactic antibiotics: In 2009 was on daily Bactrim - unclear when this was stopped  Intravesical gentamycin:  None  Intravesical oxybutinin: None    Catheterization History:  The patient wears an indwelling 24F suprapubic catheter and home RN changes it q4 weeks. She does not irrigate the bladder regularly. Prior to SPT placement in 2009 had chronic urethral nguyen.    Incontinence History:  She does leak from urethra and around SPT. Chronic leakage from urethra. She also experiences bladder spasms which causes severe pain - takes pyridium for this  sensation.    Urinary Tract Infection History:  Treated with antibiotics for positive culture and non-specific symptoms: 0 times in the last year  Treated with antibiotics for positive culture plus symptoms of UTI: 8 times in the last year. Worse spasms, pain, incontinence. 30+ positive cultures with E. Coli, Klebsiella, pseudomonas over past 5 years.    Bowel Movement History:  The patient has a bowel movement q2 days. Bowel regimen includes Miralax, Senna-S           Past Medical History:     Past Medical History:   Diagnosis Date     Anxiety      Asthma      Depression      DM (diabetes mellitus) (H)      Frequent UTI      History of blood transfusion      History of ulcer disease 2014    She notes from NSAIDs; nearly . Discussed a hospitalization at [place] for this.     Hypertension      Hypothyroid      Iatrogenic Cushing's disease (H)     off prednisone now     Morbid obesity (H)      MRSA (methicillin resistant staph aureus) culture positive 2012    repeat cx 10/12/2012 no staph mentioned.     Osteoarthritis     multiple joings.     Other chronic pain      Sleep apnea     No longer uses cpap.            Past Surgical History:     Past Surgical History:   Procedure Laterality Date     ABDOMEN SURGERY       C LAMINECTOMY,FACETECTOMY,LUMBAR       C TOTAL KNEE ARTHROPLASTY      left     DISCECTOMY, FUSION CERVICAL ANTERIOR THREE+ LEVELS, COMBINED Left 5/3/2016    Procedure: COMBINED DISCECTOMY, FUSION CERVICAL ANTERIOR THREE+ LEVELS;  Surgeon: Jasvir Torres MD;  Location: [facility] OR     GENITOURINARY SURGERY      suprapubic catheter     HERNIA REPAIR      double hernia     HYSTERECTOMY, PAP NO LONGER INDICATED      CELIA and large ovarian tumor removed     SURGICAL HISTORY OF -       left cataract surgery     SURGICAL HISTORY OF -       right cataract surgery and left laser revision     SURGICAL HISTORY OF -   2015    left carpal tunnel release     TONSILLECTOMY  1974  "           Social History:     Social History   Substance Use Topics     Smoking status: Never Smoker     Smokeless tobacco: Never Used     Alcohol use No            Family History:     Family History   Problem Relation Age of Onset     Depression Son      Hypertension Mother      HEART DISEASE Mother      bypass     Depression Mother      Hypertension Father      Connective Tissue Disorder Father      ankylosing spondylitis     CANCER Father      colon; prostate     Other Cancer Father      bladder cancer     Depression Sister             Allergies:     Allergies   Allergen Reactions     Povidone Iodine      \"mild skin irritation\" per patient report     Toradol [Ketorolac] Other (See Comments)     Pt gets nightmares            Medications:     Current Outpatient Prescriptions   Medication Sig     buPROPion (WELLBUTRIN XL) 300 MG 24 hr tablet Take 1 tablet (300 mg) by mouth every morning     simvastatin (ZOCOR) 20 MG tablet Take 1 tablet (20 mg) by mouth At Bedtime     lisinopril (PRINIVIL/ZESTRIL) 2.5 MG tablet Take 1 tablet (2.5 mg) by mouth daily     ciprofloxacin (CIPRO) 500 MG tablet Take 1 tablet (500 mg) by mouth 2 times daily for 7 days     levothyroxine (SYNTHROID/LEVOTHROID) 100 MCG tablet TAKE 1 TABLET(100 MCG) BY MOUTH DAILY     ondansetron (ZOFRAN ODT) 4 MG ODT tab Take 1-2 tablets (4-8 mg) by mouth every 8 hours as needed for nausea     methadone (DOLOPHINE) 10 MG tablet Take 2 pills TID     metFORMIN (GLUCOPHAGE-XR) 500 MG 24 hr tablet TAKE 2 TABLETS(1000 MG) BY MOUTH TWICE DAILY WITH MEALS     traZODone (DESYREL) 100 MG tablet Take 1 tablet (100 mg) by mouth At Bedtime     diazepam (VALIUM) 5 MG tablet Take 1 tablet (5 mg) by mouth every 8 hours as needed for anxiety or other (pain, muscle spasm)     senna-docusate (SENOKOT-S;PERICOLACE) 8.6-50 MG per tablet Take 2 tablets by mouth 2 times daily     order for DME Equipment being ordered: BIPAP.   15/5, Rate of 12, 2L O2 to keep sats 90-95%.     " "gabapentin (NEURONTIN) 600 MG tablet 1,200 mg 3 times daily      nystatin (MYCOSTATIN) 032678 UNIT/GM POWD Apply topically 3 times daily as needed     albuterol (PROAIR HFA, PROVENTIL HFA, VENTOLIN HFA) 108 (90 BASE) MCG/ACT inhaler Inhale 2 puffs into the lungs every 6 hours as needed for shortness of breath / dyspnea or wheezing     order for DME Equipment being ordered: Nebulizer     albuterol (2.5 MG/3ML) 0.083% nebulizer solution Take 1 vial (2.5 mg) by nebulization every 4 hours as needed for shortness of breath / dyspnea or wheezing     order for DME Equipment being ordered: Hospital Bed, total electric (head, foot, and height adjustments), with any type side rails, with mattress     order for DME Equipment being ordered: Motorized Wheelchair     order for DME Equipment being ordered: Trapeze bar for hospital bed     Phenazopyridine HCl (PYRIDIUM PO) Take 2 tablets by mouth 3 times daily as needed Dose unknown     blood glucose monitoring (ACCU-CHEK FASTCLIX) lancets Use to test blood sugar 1 times daily or as directed.     blood glucose monitoring (ACCU-CHEK SP SMARTVIEW) meter device kit Use to test blood sugar 1 times daily or as directed.     DULoxetine HCl (CYMBALTA PO) Take 120 mg by mouth daily      tiZANidine (ZANAFLEX) 4 MG tablet Take 1 tablet (4 mg) by mouth 3 times daily     aspirin 81 MG tablet Take by mouth daily     MIRALAX OR 1 capful daily prn     MULTIVITAMIN OR 1 a day     No current facility-administered medications for this visit.           Review of Systems:    ROS: 14 point ROS neg other than the symptoms noted above in the HPI and PMH.          Physical Exam:   B/P: 168/92, T: Data Unavailable, P: 88, R: Data Unavailable  Estimated body mass index is 46.67 kg/(m^2) as calculated from the following:    Height as of this encounter: 1.702 m (5' 7\").    Weight as of this encounter: 135.2 kg (298 lb).  General: age-appropriate appearing female in NAD sitting in a wheelchair.    HEENT: " Head AT/NC, EOMI, CN Grossly intact.  Resp: no respiratory distress  CV: heart rate regular  Lymph: No cervical, supraclavicular or axillary lymphadenopathy  Back: bony spine is non-tender, flanks are non-tender.  Abdomen: Degree of obesity is severe. Abdomen is soft and nontender. No organomegaly. Surgical scars include massive midline surgical scar with large ventral hernia. SPT site is buried under rolls of fat. Site is clean.  LE: Edema is 1+             Data:    Imaging:  Renal/Bladder Ultrasound (Date = 7/31/2017):       Right hydronephrosis: none       Left hydronephrosis: none       Kidney stones:None  Bladder: Not visualized    Labs:  Creatinine (Date = 7/26/17): 0.8  Vitamin B12: (Date = ): None         Assessment and Plan:   61 year old female with neurogenic bladder secondary of unclear etiology - possibly due to spinal cord pathology/surgeries. However, looking back at records from 2009 they report her having an indwelling catheter due to immobility and bedbound status, unable to transfer. Now reporting leakage per urethra and SP site with bladder spasms.      - Plan for bladder botox in OR per urethra. Will be able to evaluate bladder, perform simple CMG and cystogram at that time    Patient was seen and examined with Dr. Gabriel Lynn MD  Urology Resident     =======================================================  As the attending surgeon I, Yoandy Rios, interviewed and examined the patient. The plan was developed between me and the patient. My findings and plan are as stated by the resident.    Yoandy Rios MD

## 2017-07-31 NOTE — NURSING NOTE
"Chief Complaint   Patient presents with     RECHECK     Neurogenic bladder. Here for urethral incontinence and bladder spasms       Blood pressure (!) 168/92, pulse 88, height 1.702 m (5' 7\"), weight 135.2 kg (298 lb), not currently breastfeeding. Body mass index is 46.67 kg/(m^2).    Patient Active Problem List   Diagnosis     Chronic low back pain     Moderate major depression (H)     Anxiety     Type 2 diabetes mellitus with diabetic nephropathy (H)     Hypothyroidism due to acquired atrophy of thyroid     Suprapubic catheter (H)     Lumbar spinal stenosis     Fibromyalgia     Osteoarthritis     Hypertension goal BP (blood pressure) < 140/90     Health Care Home     Neuropathy (H)     Hyperlipidemia LDL goal <100     ACP (advance care planning)     Recurrent UTI (urinary tract infection)     Controlled substance agreement signed 10/24/2014     Chronic narcotic dependence (H)     History of ulcer disease     Wheelchair bound     Asthma     Personal history of methicillin resistant Staphylococcus aureus     Fracture of lower extremity     Neurogenic bladder     Ventral hernia     Osteoarthritis of cervical spine with myelopathy     DISH (diffuse idiopathic skeletal hyperostosis)     Morbid obesity, unspecified obesity type (H)     Methadone use (H)       Allergies   Allergen Reactions     Povidone Iodine      \"mild skin irritation\" per patient report     Toradol [Ketorolac] Other (See Comments)     Pt gets nightmares       Current Outpatient Prescriptions   Medication Sig Dispense Refill     buPROPion (WELLBUTRIN XL) 300 MG 24 hr tablet Take 1 tablet (300 mg) by mouth every morning 90 tablet 1     simvastatin (ZOCOR) 20 MG tablet Take 1 tablet (20 mg) by mouth At Bedtime 90 tablet 0     lisinopril (PRINIVIL/ZESTRIL) 2.5 MG tablet Take 1 tablet (2.5 mg) by mouth daily 90 tablet 0     ciprofloxacin (CIPRO) 500 MG tablet Take 1 tablet (500 mg) by mouth 2 times daily for 7 days 14 tablet 0     levothyroxine " (SYNTHROID/LEVOTHROID) 100 MCG tablet TAKE 1 TABLET(100 MCG) BY MOUTH DAILY 90 tablet 0     ondansetron (ZOFRAN ODT) 4 MG ODT tab Take 1-2 tablets (4-8 mg) by mouth every 8 hours as needed for nausea 20 tablet 1     methadone (DOLOPHINE) 10 MG tablet Take 2 pills  tablet 0     metFORMIN (GLUCOPHAGE-XR) 500 MG 24 hr tablet TAKE 2 TABLETS(1000 MG) BY MOUTH TWICE DAILY WITH MEALS 360 tablet 0     traZODone (DESYREL) 100 MG tablet Take 1 tablet (100 mg) by mouth At Bedtime 90 tablet 1     diazepam (VALIUM) 5 MG tablet Take 1 tablet (5 mg) by mouth every 8 hours as needed for anxiety or other (pain, muscle spasm) 30 tablet 0     senna-docusate (SENOKOT-S;PERICOLACE) 8.6-50 MG per tablet Take 2 tablets by mouth 2 times daily 100 tablet      order for DME Equipment being ordered: BIPAP.   15/5, Rate of 12, 2L O2 to keep sats 90-95%. 1 each 0     gabapentin (NEURONTIN) 600 MG tablet 1,200 mg 3 times daily   2     nystatin (MYCOSTATIN) 915353 UNIT/GM POWD Apply topically 3 times daily as needed 30 g 1     albuterol (PROAIR HFA, PROVENTIL HFA, VENTOLIN HFA) 108 (90 BASE) MCG/ACT inhaler Inhale 2 puffs into the lungs every 6 hours as needed for shortness of breath / dyspnea or wheezing 1 Inhaler 0     order for DME Equipment being ordered: Nebulizer 1 Package 0     albuterol (2.5 MG/3ML) 0.083% nebulizer solution Take 1 vial (2.5 mg) by nebulization every 4 hours as needed for shortness of breath / dyspnea or wheezing 30 vial 1     order for DME Equipment being ordered: Hospital Bed, total electric (head, foot, and height adjustments), with any type side rails, with mattress 1 each 0     order for DME Equipment being ordered: Motorized Wheelchair 1 each 0     order for DME Equipment being ordered: Trapeze bar for hospital bed 1 each 0     Phenazopyridine HCl (PYRIDIUM PO) Take 2 tablets by mouth 3 times daily as needed Dose unknown       blood glucose monitoring (ACCU-CHEK FASTCLIX) lancets Use to test blood sugar 1  times daily or as directed. 1 Box prn     blood glucose monitoring (ACCU-CHEK SP SMARTVIEW) meter device kit Use to test blood sugar 1 times daily or as directed. 1 kit 0     DULoxetine HCl (CYMBALTA PO) Take 120 mg by mouth daily        tiZANidine (ZANAFLEX) 4 MG tablet Take 1 tablet (4 mg) by mouth 3 times daily 90 tablet      aspirin 81 MG tablet Take by mouth daily 30 tablet      MIRALAX OR 1 capful daily prn       MULTIVITAMIN OR 1 a day         Social History   Substance Use Topics     Smoking status: Never Smoker     Smokeless tobacco: Never Used     Alcohol use No       DANE Fink  7/31/2017  5:07 PM

## 2017-08-01 NOTE — PROGRESS NOTES
Pre Op Teaching Flowsheet       Pre and Post op Patient Education  Relevant Diagnosis:  Neurogenic bladder  Teaching Topic:  Pre and post op teaching for Cystoscopy and Botox injection into the bladder  Person Involved in teaching:  Bhavani White      Motivation Level:  Asks Questions: Yes  Eager to Learn:  Yes  Cooperative: Yes  Receptive (willing/able to accept information):  Yes  Patient demonstrates understanding of the following:  Date and time of surgery:  8.23.17  Location of surgery: [address] [place] [address]  History and Physical and any other testing necessary prior to surgery: Yes  Required time line for completion of History and Physical and any pre-op testing: Yes    NPO Guidelines: NPO per Anesthesia Guidelines    Patient demonstrates understanding of the following:  Pre-op bowel prep: not needed  Pre-op showering/scrub information with Hibiclens Soap: Yes  Medications to take the day of surgery:  Per PCP  Blood thinner medications discussed and when to stop (if applicable):  Yes  Diabetes medication management (if applicable):  Patient to discuss with Primary Care Provider  Discussed pain control after surgery: pain scale, pain medications and pain management techniques  Infection Prevention: Patient demonstrates understanding of the following:  Patient instructed on hand hygiene:  Yes  Surgical procedure site care taught: N/A  Signs and symptoms of infection taught:  Yes  Wound care will be taught at the time of discharge.  Central venous catheter care will be taught at the time of discharge (if applicable).    Post-op follow-up:  Discussed how to contact the hospital, nurse, and clinic scheduling staff if necessary.    Instructional materials used/given/mailed:  [facility] Booklet, post op teaching sheet, Map, Soap, and arrival/location information.    Surgical instructions given to patient in clinic: Yes.    Instructional Materials given:  Before your surgery packet , Medications to avoid  before surgery , Showering or Bathing instructions before surgery  and What to expect after surgery    Post-op appointment/testing scheduled per MD orders: No    Total time with patient: 10 minutes    Ira Suárze, RN-BC, BSN  Urology Care Coordinator

## 2017-08-09 ENCOUNTER — OFFICE VISIT (OUTPATIENT)
Dept: FAMILY MEDICINE | Facility: CLINIC | Age: 62
End: 2017-08-09
Payer: COMMERCIAL

## 2017-08-09 VITALS
OXYGEN SATURATION: 97 % | SYSTOLIC BLOOD PRESSURE: 130 MMHG | HEART RATE: 95 BPM | DIASTOLIC BLOOD PRESSURE: 80 MMHG | TEMPERATURE: 98.9 F | RESPIRATION RATE: 16 BRPM

## 2017-08-09 DIAGNOSIS — F41.9 ANXIETY: ICD-10-CM

## 2017-08-09 DIAGNOSIS — F32.1 MODERATE MAJOR DEPRESSION (H): Primary | ICD-10-CM

## 2017-08-09 DIAGNOSIS — Z01.818 PREOP GENERAL PHYSICAL EXAM: Primary | ICD-10-CM

## 2017-08-09 PROCEDURE — 99214 OFFICE O/P EST MOD 30 MIN: CPT | Performed by: FAMILY MEDICINE

## 2017-08-09 NOTE — PROGRESS NOTES
[facility]  [address] [facility] [address]  [phone]  Dept: [phone]    PRE-OP EVALUATION:  Today's date: 2017    Bhavani White (: 1955) presents for pre-operative evaluation assessment as requested by Dr.Sean Rios.  She requires evaluation and anesthesia risk assessment prior to undergoing surgery/procedure for treatment of bladder .  Proposed procedure:   CYSTOSCOPY, INTRAVESICAL INJECTION N/A Monitor Anesthesia Care   Cystoscopy, Botox Injection Into The Bladder  Latex Allergy          Date of Surgery/ Procedure: 17  Time of Surgery/ Procedure: 11:30am  Hospital/Surgical Facility: [facility]  Fax number for surgical facility:   Primary Physician: Shawnee Dueñas  Type of Anesthesia Anticipated: General    Patient has a Health Care Directive or Living Will:  YES scanned    Preop Questions 2017   1.  Do you have a history of heart attack, stroke, stent, bypass or surgery on an artery in the head, neck, heart or legs? No   2.  Do you ever have any pain or discomfort in your chest? No   3.  Do you have a history of  Heart Failure? No   4.   Are you troubled by shortness of breath when:  walking on a level surface, or up a slight hill, or at night? No   5.  Do you currently have a cold, bronchitis or other respiratory infection? No   6.  Do you have a cough, shortness of breath, or wheezing? No   7.  Do you sometimes get pains in the calves of your legs when you walk? No   8. Do you or anyone in your family have previous history of blood clots? No   9.  Do you or does anyone in your family have a serious bleeding problem such as prolonged bleeding following surgeries or cuts? No   10. Have you ever had problems with anemia or been told to take iron pills? No   11. Have you had any abnormal blood loss such as black, tarry or bloody stools, or abnormal vaginal bleeding? No   12. Have you ever had a blood transfusion? YES - no  complications   13. Have you or any of your relatives ever had problems with anesthesia? No   14. Do you have sleep apnea, excessive snoring or daytime drowsiness? YES -    15. Do you have any prosthetic heart valves? No   16. Do you have prosthetic joints? YES - L knee   17. Is there any chance that you may be pregnant? No           HPI:                                                      Brief HPI related to upcoming procedure: Patient has a suprapubic catheter and has been having frequent episodes of severe bladder pain.        See problem list for active medical problems.  Problems all longstanding and stable, except as noted/documented.  See ROS for pertinent symptoms related to these conditions.                                                                                                  .    MEDICAL HISTORY:                                                    Patient Active Problem List    Diagnosis Date Noted     Methadone use (H) 2017     Priority: Medium     Morbid obesity, unspecified obesity type (H) 2016     Priority: Medium     Osteoarthritis of cervical spine with myelopathy 2016     Priority: Medium     DISH (diffuse idiopathic skeletal hyperostosis) 2016     Priority: Medium     Wheelchair bound 08/15/2015     Priority: Medium     Personal history of methicillin resistant Staphylococcus aureus 2015     Priority: Medium     Neurogenic bladder 2015     Priority: Medium     Ventral hernia 2015     Priority: Medium     History of ulcer disease 2014     Priority: Medium     She notes from NSAIDs; nearly . Discussed a hospitalization at [place] for this.       Chronic narcotic dependence (H) 2014     Priority: Medium     Recurrent UTI (urinary tract infection) 10/24/2014     Priority: Medium     Controlled substance agreement signed 10/24/2014 10/24/2014     Priority: Medium     She had a controlled substance agreement with [facility] until seen  by pain clinic. Now followed by pain clinic; see chronic pain       Fracture of lower extremity 06/26/2013     Priority: Medium     ACP (advance care planning) 02/26/2013     Priority: Medium     Advance Care Planning:   Receipt of ACP document:  Received: Resuscitation Guidelines order which was witnessed and signed by provider on 7-22-09 and 3-18-10.  Document previously scanned on 7-27-09 and 3-19-10. Order reviewed and found to be valid.  Code Status  needs to be updated to reflect choices in most recent ACP document. Confirmed/documented designated decision maker(s). See permanent comments section of demographics in clinical tab. View document(s) and details by clicking on code status. Added by Jie Stewart RN System ACP Cordinator on 2/26/2013.             Hyperlipidemia LDL goal <100 05/13/2011     Priority: Medium     Neuropathy (H) 02/21/2011     Priority: Medium     Asthma 02/15/2011     Priority: Medium     Suprapubic catheter (H) 02/11/2011     Priority: Medium     Lumbar spinal stenosis 02/11/2011     Priority: Medium     Fibromyalgia 02/11/2011     Priority: Medium     Osteoarthritis 02/11/2011     Priority: Medium     Hypertension goal BP (blood pressure) < 140/90 02/11/2011     Priority: Medium     Health Care Home 02/11/2011     Priority: Medium       Status:  No active care coordination   Care Coordinator:  Wendy Pearson [phone]  See Letters for [facility] Care Plan  Date:  May 23, 2016           Hypothyroidism due to acquired atrophy of thyroid 11/05/2010     Priority: Medium     Type 2 diabetes mellitus with diabetic nephropathy (H) 10/31/2010     Priority: Medium     Chronic low back pain 05/28/2009     Priority: Medium     1981       Moderate major depression (H) 05/28/2009     Priority: Medium     Anxiety 05/28/2009     Priority: Medium      Past Medical History:   Diagnosis Date     Anxiety      Asthma      Depression      DM (diabetes mellitus) (H) 2003     Frequent UTI      History of  blood transfusion      History of ulcer disease 2014    She notes from NSAIDs; nearly . Discussed a hospitalization at [place] for this.     Hypertension      Hypothyroid      Iatrogenic Cushing's disease (H)     off prednisone now     Morbid obesity (H)      MRSA (methicillin resistant staph aureus) culture positive 2012    repeat cx 10/12/2012 no staph mentioned.     Osteoarthritis     multiple joings.     Other chronic pain      Sleep apnea     No longer uses cpap.     Past Surgical History:   Procedure Laterality Date     ABDOMEN SURGERY       C LAMINECTOMY,FACETECTOMY,LUMBAR  1982     C TOTAL KNEE ARTHROPLASTY      left     DISCECTOMY, FUSION CERVICAL ANTERIOR THREE+ LEVELS, COMBINED Left 5/3/2016    Procedure: COMBINED DISCECTOMY, FUSION CERVICAL ANTERIOR THREE+ LEVELS;  Surgeon: Jasvir Torres MD;  Location: [facility] OR     GENITOURINARY SURGERY      suprapubic catheter     HERNIA REPAIR      double hernia     HYSTERECTOMY, PAP NO LONGER INDICATED      CELIA and large ovarian tumor removed     SURGICAL HISTORY OF -       left cataract surgery     SURGICAL HISTORY OF -       right cataract surgery and left laser revision     SURGICAL HISTORY OF -   2015    left carpal tunnel release     TONSILLECTOMY  1974     Current Outpatient Prescriptions   Medication Sig Dispense Refill     buPROPion (WELLBUTRIN XL) 300 MG 24 hr tablet Take 1 tablet (300 mg) by mouth every morning 90 tablet 1     simvastatin (ZOCOR) 20 MG tablet Take 1 tablet (20 mg) by mouth At Bedtime 90 tablet 0     lisinopril (PRINIVIL/ZESTRIL) 2.5 MG tablet Take 1 tablet (2.5 mg) by mouth daily 90 tablet 0     levothyroxine (SYNTHROID/LEVOTHROID) 100 MCG tablet TAKE 1 TABLET(100 MCG) BY MOUTH DAILY 90 tablet 0     ondansetron (ZOFRAN ODT) 4 MG ODT tab Take 1-2 tablets (4-8 mg) by mouth every 8 hours as needed for nausea 20 tablet 1     methadone (DOLOPHINE) 10 MG tablet Take 2 pills  tablet 0     metFORMIN  (GLUCOPHAGE-XR) 500 MG 24 hr tablet TAKE 2 TABLETS(1000 MG) BY MOUTH TWICE DAILY WITH MEALS 360 tablet 0     traZODone (DESYREL) 100 MG tablet Take 1 tablet (100 mg) by mouth At Bedtime 90 tablet 1     diazepam (VALIUM) 5 MG tablet Take 1 tablet (5 mg) by mouth every 8 hours as needed for anxiety or other (pain, muscle spasm) 30 tablet 0     senna-docusate (SENOKOT-S;PERICOLACE) 8.6-50 MG per tablet Take 2 tablets by mouth 2 times daily 100 tablet      order for DME Equipment being ordered: BIPAP.   15/5, Rate of 12, 2L O2 to keep sats 90-95%. 1 each 0     gabapentin (NEURONTIN) 600 MG tablet 1,200 mg 3 times daily   2     nystatin (MYCOSTATIN) 095020 UNIT/GM POWD Apply topically 3 times daily as needed 30 g 1     albuterol (PROAIR HFA, PROVENTIL HFA, VENTOLIN HFA) 108 (90 BASE) MCG/ACT inhaler Inhale 2 puffs into the lungs every 6 hours as needed for shortness of breath / dyspnea or wheezing 1 Inhaler 0     order for DME Equipment being ordered: Nebulizer 1 Package 0     albuterol (2.5 MG/3ML) 0.083% nebulizer solution Take 1 vial (2.5 mg) by nebulization every 4 hours as needed for shortness of breath / dyspnea or wheezing 30 vial 1     order for DME Equipment being ordered: Hospital Bed, total electric (head, foot, and height adjustments), with any type side rails, with mattress 1 each 0     order for DME Equipment being ordered: Motorized Wheelchair 1 each 0     order for DME Equipment being ordered: Trapeze bar for hospital bed 1 each 0     Phenazopyridine HCl (PYRIDIUM PO) Take 2 tablets by mouth 3 times daily as needed Dose unknown       blood glucose monitoring (ACCU-CHEK FASTCLIX) lancets Use to test blood sugar 1 times daily or as directed. 1 Box prn     blood glucose monitoring (ACCU-CHEK SP SMARTVIEW) meter device kit Use to test blood sugar 1 times daily or as directed. 1 kit 0     DULoxetine HCl (CYMBALTA PO) Take 120 mg by mouth daily        tiZANidine (ZANAFLEX) 4 MG tablet Take 1 tablet (4 mg) by  "mouth 3 times daily 90 tablet      aspirin 81 MG tablet Take by mouth daily 30 tablet      MIRALAX OR 1 capful daily prn       MULTIVITAMIN OR 1 a day       OTC products: None, except as noted above    Allergies   Allergen Reactions     Povidone Iodine      \"mild skin irritation\" per patient report     Toradol [Ketorolac] Other (See Comments)     Pt gets nightmares      Latex Allergy: NO    Social History   Substance Use Topics     Smoking status: Never Smoker     Smokeless tobacco: Never Used     Alcohol use No     History   Drug Use No       REVIEW OF SYSTEMS:                                                    C: NEGATIVE for fever, chills, change in weight  I: NEGATIVE for worrisome rashes, moles or lesions  E: NEGATIVE for vision changes or irritation  E/M: NEGATIVE for ear, mouth and throat problems  R: NEGATIVE for significant cough or SOB  B: NEGATIVE for masses, tenderness or discharge  CV: NEGATIVE for chest pain, palpitations or peripheral edema  GI: NEGATIVE for nausea, abdominal pain, heartburn, or change in bowel habits  : NEGATIVE for frequency, dysuria, or hematuria  M: NEGATIVE for significant arthralgias or myalgia  N: NEGATIVE for weakness, dizziness or paresthesias  E: NEGATIVE for temperature intolerance, skin/hair changes  H: NEGATIVE for bleeding problems  P: NEGATIVE for changes in mood or affect    EXAM:                                                    /80 (BP Location: Right arm, Patient Position: Chair, Cuff Size: Adult Regular)  Pulse 95  Temp 98.9  F (37.2  C) (Oral)  Resp 16  SpO2 97%    GENERAL APPEARANCE: healthy, alert and no distress     EYES: EOMI, PERRL     HENT: ear canals and TM's normal and nose and mouth without ulcers or lesions     NECK: no adenopathy, no asymmetry, masses, or scars and thyroid normal to palpation     RESP: lungs clear to auscultation - no rales, rhonchi or wheezes     CV: regular rates and rhythm, normal S1 S2, no S3 or S4 and no murmur, click " or rub     ABDOMEN:  soft, nontender, no HSM or masses and bowel sounds normal     MS: extremities normal- no gross deformities noted, no evidence of inflammation in joints, FROM in all extremities.     SKIN: no suspicious lesions or rashes     NEURO: Normal strength and tone, sensory exam grossly normal, mentation intact and speech normal     PSYCH: mentation appears normal. and affect normal/bright     LYMPHATICS: No axillary, cervical, or supraclavicular nodes    DIAGNOSTICS:                                                    EKG (6/1/17): appears normal, NSR, normal axis, normal intervals, no acute ST/T changes c/w ischemia, Right Bundle Branch Block, unchanged from previous tracings    Recent Labs   Lab Test  07/26/17   1532  06/01/17   1510  11/18/16   1132  05/19/16   0533   04/18/16   1528   HGB  14.5   --    --   12.7   < >  15.5   PLT  211   --    --   202   < >  198   INR  0.97   --    --    --    --   1.00   NA  136   --   136  137   < >  135   POTASSIUM  4.7   --   4.5  4.0   < >  4.3   CR  0.80   --   0.92  0.63   < >  0.63   A1C   --   7.7*  6.3*   --    < >  7.9*    < > = values in this interval not displayed.        IMPRESSION:                                                    Reason for surgery/procedure: Cystoscopy with Botox injections   Diagnosis/reason for consult: Pre-op risk assessment     The proposed surgical procedure is considered INTERMEDIATE risk.    REVISED CARDIAC RISK INDEX  The patient has the following serious cardiovascular risks for perioperative complications such as (MI, PE, VFib and 3  AV Block):  No serious cardiac risks  INTERPRETATION: 0 risks: Class I (very low risk - 0.4% complication rate)    The patient has the following additional risks for perioperative complications:  No identified additional risks      ICD-10-CM    1. Preop general physical exam Z01.818        RECOMMENDATIONS:                                                      --Consult hospital rounder / IM to  assist post-op medical management    --Patient is to take all scheduled medications on the day of surgery EXCEPT for modifications listed below.    Diabetes Medication Use  -----Hold usual  oral diabetic meds (e.g. Metformin, Actos, Glipizide) while NPO.       Anticoagulant or Antiplatelet Medication Use  ASPIRIN: Discontinue ASA 7-10 days prior to procedure to reduce bleeding risk.  It should be resumed post-operatively.        ACE Inhibitor or Angiotensin Receptor Blocker (ARB) Use  Ace inhibitor or Angiotensin Receptor Blocker (ARB) and should HOLD this medication for the 24 hours prior to surgery.      APPROVAL GIVEN to proceed with proposed procedure, without further diagnostic evaluation       Signed Electronically by: Shawnee Dueñas DO    Copy of this evaluation report is provided to requesting physician.    [facility] Preop Guidelines

## 2017-08-09 NOTE — NURSING NOTE
"Chief Complaint   Patient presents with     Pre-Op Exam       Initial There were no vitals taken for this visit. Estimated body mass index is 46.67 kg/(m^2) as calculated from the following:    Height as of 7/31/17: 5' 7\" (1.702 m).    Weight as of 7/31/17: 298 lb (135.2 kg).  Medication Reconciliation: complete   Ame Ayala CMA      "

## 2017-08-09 NOTE — PATIENT INSTRUCTIONS
Before Your Surgery    Diabetes Medication Use  -----Hold usual  oral diabetic meds (e.g. Metformin, Actos, Glipizide) while NPO.       Anticoagulant or Antiplatelet Medication Use  ASPIRIN: Discontinue ASA 7-10 days prior to procedure to reduce bleeding risk.  It should be resumed post-operatively.        ACE Inhibitor or Angiotensin Receptor Blocker (ARB) Use  Ace inhibitor (lisinopril) and should HOLD this medication for the 24 hours prior to surgery        Call your surgeon if there is any change in your health. This includes signs of a cold or flu (such as a sore throat, runny nose, cough, rash or fever).    Do not smoke, drink alcohol or take over the counter medicine (unless your surgeon or primary care doctor tells you to) for the 24 hours before and after surgery.    If you take prescribed drugs: Follow your doctor s orders about which medicines to take and which to stop until after surgery.    Eating and drinking prior to surgery: follow the instructions from your surgeon    Take a shower or bath the night before surgery. Use the soap your surgeon gave you to gently clean your skin. If you do not have soap from your surgeon, use your regular soap. Do not shave or scrub the surgery site.  Wear clean pajamas and have clean sheets on your bed.

## 2017-08-09 NOTE — MR AVS SNAPSHOT
After Visit Summary   8/9/2017    Bhavani White    MRN: [id number]           Patient Information     Date Of Birth          1955        Visit Information        Provider Department      8/9/2017 2:20 PM Shawnee Dueñas,  [facility]        Today's Diagnoses     Preop general physical exam    -  1      Care Instructions      Before Your Surgery    Diabetes Medication Use  -----Hold usual  oral diabetic meds (e.g. Metformin, Actos, Glipizide) while NPO.       Anticoagulant or Antiplatelet Medication Use  ASPIRIN: Discontinue ASA 7-10 days prior to procedure to reduce bleeding risk.  It should be resumed post-operatively.        ACE Inhibitor or Angiotensin Receptor Blocker (ARB) Use  Ace inhibitor (lisinopril) and should HOLD this medication for the 24 hours prior to surgery        Call your surgeon if there is any change in your health. This includes signs of a cold or flu (such as a sore throat, runny nose, cough, rash or fever).    Do not smoke, drink alcohol or take over the counter medicine (unless your surgeon or primary care doctor tells you to) for the 24 hours before and after surgery.    If you take prescribed drugs: Follow your doctor s orders about which medicines to take and which to stop until after surgery.    Eating and drinking prior to surgery: follow the instructions from your surgeon    Take a shower or bath the night before surgery. Use the soap your surgeon gave you to gently clean your skin. If you do not have soap from your surgeon, use your regular soap. Do not shave or scrub the surgery site.  Wear clean pajamas and have clean sheets on your bed.           Follow-ups after your visit        Your next 10 appointments already scheduled     Aug 23, 2017   Procedure with Yoandy Rios MD   [facility], Same Day Surgery (--)    [address] [facility] [address] [phone]              Who to contact     If you have questions or need  follow up information about today's clinic visit or your schedule please contact [facility] directly at [phone].  Normal or non-critical lab and imaging results will be communicated to you by MyChart, letter or phone within 4 business days after the clinic has received the results. If you do not hear from us within 7 days, please contact the clinic through [employer] or phone. If you have a critical or abnormal lab result, we will notify you by phone as soon as possible.  Submit refill requests through [employer] or call your pharmacy and they will forward the refill request to us. Please allow 3 business days for your refill to be completed.          Additional Information About Your Visit        [employer]What's Trending Information     [employer] gives you secure access to your electronic health record. If you see a primary care provider, you can also send messages to your care team and make appointments. If you have questions, please call your primary care clinic.  If you do not have a primary care provider, please call [phone] and they will assist you.        Care EveryWhere ID     This is your Care EveryWhere ID. This could be used by other organizations to access your [facility] medical records  [id number]        Your Vitals Were     Pulse Temperature Respirations Pulse Oximetry          95 98.9  F (37.2  C) (Oral) 16 97%         Blood Pressure from Last 3 Encounters:   08/09/17 130/80   07/31/17 (!) 168/92   07/26/17 128/88    Weight from Last 3 Encounters:   07/31/17 298 lb (135.2 kg)   06/26/17 298 lb (135.2 kg)   06/01/17 298 lb (135.2 kg)              Today, you had the following     No orders found for display       Primary Care Provider Office Phone # Fax #    Shawnee Natasha DO [facility] [phone] [address]  [facility] [address]        Equal Access to Services     JOSE LUIS GOMEZ : Noni Lujan, zoraida conner, qaybta rowan condon  aldairsuzettesarah mcdanielsAmiaaalea ah. So [facility] [phone].    ATENCIÓN: Si huy pacheco, tiene a shay disposición servicios gratuitos de asistencia lingüística. Zoraida al [phone].    We comply with applicable federal civil rights laws and [place] laws. We do not discriminate on the basis of race, color, national origin, age, disability sex, sexual orientation or gender identity.            Thank you!     Thank you for choosing [facility]  for your care. Our goal is always to provide you with excellent care. Hearing back from our patients is one way we can continue to improve our services. Please take a few minutes to complete the written survey that you may receive in the mail after your visit with us. Thank you!             Your Updated Medication List - Protect others around you: Learn how to safely use, store and throw away your medicines at www.disposemymeds.org.          This list is accurate as of: 8/9/17  3:08 PM.  Always use your most recent med list.                   Brand Name Dispense Instructions for use Diagnosis    * albuterol 108 (90 BASE) MCG/ACT Inhaler    PROAIR HFA/PROVENTIL HFA/VENTOLIN HFA    1 Inhaler    Inhale 2 puffs into the lungs every 6 hours as needed for shortness of breath / dyspnea or wheezing    Moderate persistent asthma with acute exacerbation       * albuterol (2.5 MG/3ML) 0.083% neb solution     30 vial    Take 1 vial (2.5 mg) by nebulization every 4 hours as needed for shortness of breath / dyspnea or wheezing    Moderate persistent asthma with acute exacerbation       aspirin 81 MG tablet     30 tablet    Take by mouth daily    Type 2 diabetes, HbA1c goal < 7% (H)       blood glucose monitoring lancets     1 Box    Use to test blood sugar 1 times daily or as directed.    Type 2 diabetes mellitus with diabetic nephropathy (H)       blood glucose monitoring meter device kit     1 kit    Use to test blood sugar 1 times daily or as directed.    Type 2 diabetes mellitus with  diabetic nephropathy (H)       buPROPion 300 MG 24 hr tablet    WELLBUTRIN XL    90 tablet    Take 1 tablet (300 mg) by mouth every morning    Moderate major depression (H)       CYMBALTA PO      Take 120 mg by mouth daily        diazepam 5 MG tablet    VALIUM    30 tablet    Take 1 tablet (5 mg) by mouth every 8 hours as needed for anxiety or other (pain, muscle spasm)    Acute respiratory failure with hypoxia and hypercapnia (H)       gabapentin 600 MG tablet    NEURONTIN     1,200 mg 3 times daily        levothyroxine 100 MCG tablet    SYNTHROID/LEVOTHROID    90 tablet    TAKE 1 TABLET(100 MCG) BY MOUTH DAILY    Hypothyroidism due to acquired atrophy of thyroid       lisinopril 2.5 MG tablet    PRINIVIL/Zestril    90 tablet    Take 1 tablet (2.5 mg) by mouth daily    Hypertension goal BP (blood pressure) < 140/90       metFORMIN 500 MG 24 hr tablet    GLUCOPHAGE-XR    360 tablet    TAKE 2 TABLETS(1000 MG) BY MOUTH TWICE DAILY WITH MEALS    Type 2 diabetes mellitus with diabetic nephropathy (H)       methadone 10 MG tablet    DOLOPHINE    150 tablet    Take 2 pills TID    Acute respiratory failure with hypoxia and hypercapnia (H)       MIRALAX PO      1 capful daily prn        MULTIVITAMIN PO      1 a day        nystatin 984019 UNIT/GM Powd    MYCOSTATIN    30 g    Apply topically 3 times daily as needed    Candidiasis of skin       ondansetron 4 MG ODT tab    ZOFRAN ODT    20 tablet    Take 1-2 tablets (4-8 mg) by mouth every 8 hours as needed for nausea    Nausea and vomiting, intractability of vomiting not specified, unspecified vomiting type       * order for DME     1 each    Equipment being ordered: Hospital Bed, total electric (head, foot, and height adjustments), with any type side rails, with mattress    Wheelchair bound, Ankylosing spondylitis (H), Spinal stenosis in cervical region, Lumbar spinal stenosis, Fibromyalgia, Chronic low back pain       * order for DME     1 each    Equipment being ordered:  Motorized Wheelchair    Wheelchair bound, Ankylosing spondylitis (H), Spinal stenosis in cervical region, Lumbar spinal stenosis, Fibromyalgia, Chronic low back pain       * order for DME     1 each    Equipment being ordered: Trapeze bar for hospital bed    Wheelchair bound, Ankylosing spondylitis (H), Spinal stenosis in cervical region, Lumbar spinal stenosis, Fibromyalgia, Chronic low back pain       * order for DME     1 Package    Equipment being ordered: Nebulizer    Moderate persistent asthma with acute exacerbation       * order for DME     1 each    Equipment being ordered: BIPAP.  15/5, Rate of 12, 2L O2 to keep sats 90-95%.    Acute respiratory failure with hypoxia and hypercapnia (H)       PYRIDIUM PO      Take 2 tablets by mouth 3 times daily as needed Dose unknown        senna-docusate 8.6-50 MG per tablet    SENOKOT-S;PERICOLACE    100 tablet    Take 2 tablets by mouth 2 times daily    Acute respiratory failure with hypoxia and hypercapnia (H)       simvastatin 20 MG tablet    ZOCOR    90 tablet    Take 1 tablet (20 mg) by mouth At Bedtime    Hyperlipidemia LDL goal <100       tiZANidine 4 MG tablet    ZANAFLEX    90 tablet    Take 1 tablet (4 mg) by mouth 3 times daily        traZODone 100 MG tablet    DESYREL    90 tablet    Take 1 tablet (100 mg) by mouth At Bedtime    Insomnia       * Notice:  This list has 7 medication(s) that are the same as other medications prescribed for you. Read the directions carefully, and ask your doctor or other care provider to review them with you.

## 2017-08-10 ASSESSMENT — ANXIETY QUESTIONNAIRES
6. BECOMING EASILY ANNOYED OR IRRITABLE: MORE THAN HALF THE DAYS
2. NOT BEING ABLE TO STOP OR CONTROL WORRYING: SEVERAL DAYS
1. FEELING NERVOUS, ANXIOUS, OR ON EDGE: NEARLY EVERY DAY
7. FEELING AFRAID AS IF SOMETHING AWFUL MIGHT HAPPEN: NOT AT ALL
GAD7 TOTAL SCORE: 10
3. WORRYING TOO MUCH ABOUT DIFFERENT THINGS: MORE THAN HALF THE DAYS
IF YOU CHECKED OFF ANY PROBLEMS ON THIS QUESTIONNAIRE, HOW DIFFICULT HAVE THESE PROBLEMS MADE IT FOR YOU TO DO YOUR WORK, TAKE CARE OF THINGS AT HOME, OR GET ALONG WITH OTHER PEOPLE: VERY DIFFICULT
5. BEING SO RESTLESS THAT IT IS HARD TO SIT STILL: NOT AT ALL

## 2017-08-10 ASSESSMENT — PATIENT HEALTH QUESTIONNAIRE - PHQ9
5. POOR APPETITE OR OVEREATING: MORE THAN HALF THE DAYS
SUM OF ALL RESPONSES TO PHQ QUESTIONS 1-9: 11

## 2017-08-11 ASSESSMENT — ANXIETY QUESTIONNAIRES: GAD7 TOTAL SCORE: 10

## 2017-08-15 ENCOUNTER — CARE COORDINATION (OUTPATIENT)
Dept: UROLOGY | Facility: CLINIC | Age: 62
End: 2017-08-15

## 2017-08-15 NOTE — PROGRESS NOTES
Upcoming surgical procedure with Dr Yoandy Rios on 8.23.17 at 1130, check in at 0930.   Surgery at ([facility] or [place]): [place]  Patient is having a Cystoscopy and Botox injection into the bladder  Pre-op physical completed: YES. Date-8.9.17.  PAC: No  Bowel Prep: No, not needed  Urine culture completed: YES. Date-7.26.17.  Culture Micro (Abnormal) 07/26/2017  6:40 PM 76   >100,000 colonies/mL Gram positive cocci   50,000 to 100,000 colonies/mL Gram positive cocci in clusters   50,000 to 100,000 colonies/mL Non lactose fermenting gram negative rods   <10,000 colonies/mL Strain 2 Non lactose fermenting gram negative rods   Susceptibility testing not routinely done        Post-operative appointment needed: No    8.15.17 - MyChart message to patient.     Ira Suárez RN-BC, BSN  Care Coordinator - Reconstructive Urology  [phone]

## 2017-08-23 ENCOUNTER — ANESTHESIA EVENT (OUTPATIENT)
Dept: SURGERY | Facility: CLINIC | Age: 62
End: 2017-08-23
Payer: MEDICARE

## 2017-08-23 ENCOUNTER — ANESTHESIA (OUTPATIENT)
Dept: SURGERY | Facility: CLINIC | Age: 62
End: 2017-08-23
Payer: MEDICARE

## 2017-08-23 ENCOUNTER — HOSPITAL ENCOUNTER (OUTPATIENT)
Facility: CLINIC | Age: 62
Discharge: HOME | End: 2017-08-23
Attending: UROLOGY | Admitting: UROLOGY
Payer: MEDICARE

## 2017-08-23 VITALS
TEMPERATURE: 97.9 F | WEIGHT: 283.73 LBS | BODY MASS INDEX: 44.53 KG/M2 | DIASTOLIC BLOOD PRESSURE: 89 MMHG | OXYGEN SATURATION: 94 % | HEIGHT: 67 IN | SYSTOLIC BLOOD PRESSURE: 133 MMHG | RESPIRATION RATE: 18 BRPM

## 2017-08-23 LAB
GLUCOSE BLDC GLUCOMTR-MCNC: 125 MG/DL (ref 70–99)
GLUCOSE BLDC GLUCOMTR-MCNC: 148 MG/DL (ref 70–99)
POTASSIUM SERPL-SCNC: 4.4 MMOL/L (ref 3.4–5.3)

## 2017-08-23 PROCEDURE — 36000051 ZZH SURGERY LEVEL 2 1ST 30 MIN - UMMC: Performed by: UROLOGY

## 2017-08-23 PROCEDURE — 36000053 ZZH SURGERY LEVEL 2 EA 15 ADDTL MIN - UMMC: Performed by: UROLOGY

## 2017-08-23 PROCEDURE — 82962 GLUCOSE BLOOD TEST: CPT

## 2017-08-23 PROCEDURE — 25000128 H RX IP 250 OP 636: Performed by: STUDENT IN AN ORGANIZED HEALTH CARE EDUCATION/TRAINING PROGRAM

## 2017-08-23 PROCEDURE — 25000132 ZZH RX MED GY IP 250 OP 250 PS 637: Mod: GY | Performed by: ANESTHESIOLOGY

## 2017-08-23 PROCEDURE — 25000125 ZZHC RX 250: Performed by: ANESTHESIOLOGY

## 2017-08-23 PROCEDURE — 40000171 ZZH STATISTIC PRE-PROCEDURE ASSESSMENT III: Performed by: UROLOGY

## 2017-08-23 PROCEDURE — 25000128 H RX IP 250 OP 636: Performed by: ANESTHESIOLOGY

## 2017-08-23 PROCEDURE — 71000027 ZZH RECOVERY PHASE 2 EACH 15 MINS: Performed by: UROLOGY

## 2017-08-23 PROCEDURE — 25000128 H RX IP 250 OP 636: Performed by: UROLOGY

## 2017-08-23 PROCEDURE — 25000576 ZZH RX IP 250 OP 636 J0585: Performed by: UROLOGY

## 2017-08-23 PROCEDURE — 37000008 ZZH ANESTHESIA TECHNICAL FEE, 1ST 30 MIN: Performed by: UROLOGY

## 2017-08-23 PROCEDURE — A9270 NON-COVERED ITEM OR SERVICE: HCPCS | Mod: GY | Performed by: ANESTHESIOLOGY

## 2017-08-23 PROCEDURE — 84132 ASSAY OF SERUM POTASSIUM: CPT | Performed by: ANESTHESIOLOGY

## 2017-08-23 PROCEDURE — 27210794 ZZH OR GENERAL SUPPLY STERILE: Performed by: UROLOGY

## 2017-08-23 PROCEDURE — 36415 COLL VENOUS BLD VENIPUNCTURE: CPT | Performed by: ANESTHESIOLOGY

## 2017-08-23 PROCEDURE — 37000009 ZZH ANESTHESIA TECHNICAL FEE, EACH ADDTL 15 MIN: Performed by: UROLOGY

## 2017-08-23 RX ORDER — FENTANYL CITRATE 50 UG/ML
25-50 INJECTION, SOLUTION INTRAMUSCULAR; INTRAVENOUS
Status: DISCONTINUED | OUTPATIENT
Start: 2017-08-23 | End: 2017-08-23 | Stop reason: HOSPADM

## 2017-08-23 RX ORDER — NALOXONE HYDROCHLORIDE 0.4 MG/ML
.1-.4 INJECTION, SOLUTION INTRAMUSCULAR; INTRAVENOUS; SUBCUTANEOUS
Status: DISCONTINUED | OUTPATIENT
Start: 2017-08-23 | End: 2017-08-23 | Stop reason: HOSPADM

## 2017-08-23 RX ORDER — KETAMINE HYDROCHLORIDE 10 MG/ML
INJECTION, SOLUTION INTRAMUSCULAR; INTRAVENOUS PRN
Status: DISCONTINUED | OUTPATIENT
Start: 2017-08-23 | End: 2017-08-23

## 2017-08-23 RX ORDER — ONDANSETRON 2 MG/ML
4 INJECTION INTRAMUSCULAR; INTRAVENOUS EVERY 30 MIN PRN
Status: DISCONTINUED | OUTPATIENT
Start: 2017-08-23 | End: 2017-08-23 | Stop reason: HOSPADM

## 2017-08-23 RX ORDER — METHADONE HYDROCHLORIDE 10 MG/1
20 TABLET ORAL ONCE
Status: COMPLETED | OUTPATIENT
Start: 2017-08-23 | End: 2017-08-23

## 2017-08-23 RX ORDER — ONDANSETRON 4 MG/1
4 TABLET, ORALLY DISINTEGRATING ORAL EVERY 30 MIN PRN
Status: DISCONTINUED | OUTPATIENT
Start: 2017-08-23 | End: 2017-08-23 | Stop reason: HOSPADM

## 2017-08-23 RX ORDER — CEFAZOLIN SODIUM 1 G/50ML
3 SOLUTION INTRAVENOUS
Status: COMPLETED | OUTPATIENT
Start: 2017-08-23 | End: 2017-08-23

## 2017-08-23 RX ORDER — MEPERIDINE HYDROCHLORIDE 25 MG/ML
12.5 INJECTION INTRAMUSCULAR; INTRAVENOUS; SUBCUTANEOUS
Status: DISCONTINUED | OUTPATIENT
Start: 2017-08-23 | End: 2017-08-23 | Stop reason: HOSPADM

## 2017-08-23 RX ORDER — OXYCODONE HYDROCHLORIDE 10 MG/1
10 TABLET ORAL
Refills: 0 | COMMUNITY
Start: 2017-08-01 | End: 2017-12-14

## 2017-08-23 RX ORDER — LIDOCAINE HYDROCHLORIDE 20 MG/ML
INJECTION, SOLUTION INFILTRATION; PERINEURAL PRN
Status: DISCONTINUED | OUTPATIENT
Start: 2017-08-23 | End: 2017-08-23

## 2017-08-23 RX ORDER — PROPOFOL 10 MG/ML
INJECTION, EMULSION INTRAVENOUS CONTINUOUS PRN
Status: DISCONTINUED | OUTPATIENT
Start: 2017-08-23 | End: 2017-08-23

## 2017-08-23 RX ORDER — LIDOCAINE 40 MG/G
CREAM TOPICAL
Status: DISCONTINUED | OUTPATIENT
Start: 2017-08-23 | End: 2017-08-23 | Stop reason: HOSPADM

## 2017-08-23 RX ORDER — SODIUM CHLORIDE, SODIUM LACTATE, POTASSIUM CHLORIDE, CALCIUM CHLORIDE 600; 310; 30; 20 MG/100ML; MG/100ML; MG/100ML; MG/100ML
INJECTION, SOLUTION INTRAVENOUS CONTINUOUS PRN
Status: DISCONTINUED | OUTPATIENT
Start: 2017-08-23 | End: 2017-08-23

## 2017-08-23 RX ORDER — CEFAZOLIN SODIUM 1 G/3ML
1 INJECTION, POWDER, FOR SOLUTION INTRAMUSCULAR; INTRAVENOUS SEE ADMIN INSTRUCTIONS
Status: DISCONTINUED | OUTPATIENT
Start: 2017-08-23 | End: 2017-08-23 | Stop reason: HOSPADM

## 2017-08-23 RX ORDER — HYDROMORPHONE HYDROCHLORIDE 1 MG/ML
.3-.5 INJECTION, SOLUTION INTRAMUSCULAR; INTRAVENOUS; SUBCUTANEOUS EVERY 10 MIN PRN
Status: DISCONTINUED | OUTPATIENT
Start: 2017-08-23 | End: 2017-08-23 | Stop reason: HOSPADM

## 2017-08-23 RX ORDER — HYDRALAZINE HYDROCHLORIDE 20 MG/ML
2.5-5 INJECTION INTRAMUSCULAR; INTRAVENOUS EVERY 10 MIN PRN
Status: DISCONTINUED | OUTPATIENT
Start: 2017-08-23 | End: 2017-08-23 | Stop reason: HOSPADM

## 2017-08-23 RX ORDER — SODIUM CHLORIDE, SODIUM LACTATE, POTASSIUM CHLORIDE, CALCIUM CHLORIDE 600; 310; 30; 20 MG/100ML; MG/100ML; MG/100ML; MG/100ML
INJECTION, SOLUTION INTRAVENOUS CONTINUOUS
Status: DISCONTINUED | OUTPATIENT
Start: 2017-08-23 | End: 2017-08-23 | Stop reason: HOSPADM

## 2017-08-23 RX ADMIN — Medication 3 G: at 12:32

## 2017-08-23 RX ADMIN — SODIUM CHLORIDE, POTASSIUM CHLORIDE, SODIUM LACTATE AND CALCIUM CHLORIDE: 600; 310; 30; 20 INJECTION, SOLUTION INTRAVENOUS at 12:16

## 2017-08-23 RX ADMIN — FENTANYL CITRATE 50 MCG: 50 INJECTION, SOLUTION INTRAMUSCULAR; INTRAVENOUS at 14:16

## 2017-08-23 RX ADMIN — MIDAZOLAM HYDROCHLORIDE 2 MG: 1 INJECTION, SOLUTION INTRAMUSCULAR; INTRAVENOUS at 12:17

## 2017-08-23 RX ADMIN — KETAMINE HCL-NACL SOLN PREF SY 50 MG/5ML-0.9% (10MG/ML) 10 MG: 10 SOLUTION PREFILLED SYRINGE at 12:31

## 2017-08-23 RX ADMIN — LIDOCAINE HYDROCHLORIDE 100 MG: 20 INJECTION, SOLUTION INFILTRATION; PERINEURAL at 12:26

## 2017-08-23 RX ADMIN — FENTANYL CITRATE 50 MCG: 50 INJECTION, SOLUTION INTRAMUSCULAR; INTRAVENOUS at 13:46

## 2017-08-23 RX ADMIN — KETAMINE HCL-NACL SOLN PREF SY 50 MG/5ML-0.9% (10MG/ML) 10 MG: 10 SOLUTION PREFILLED SYRINGE at 12:36

## 2017-08-23 RX ADMIN — KETAMINE HCL-NACL SOLN PREF SY 50 MG/5ML-0.9% (10MG/ML) 10 MG: 10 SOLUTION PREFILLED SYRINGE at 12:51

## 2017-08-23 RX ADMIN — METHADONE HYDROCHLORIDE 20 MG: 10 TABLET ORAL at 15:15

## 2017-08-23 RX ADMIN — PROPOFOL 150 MCG/KG/MIN: 10 INJECTION, EMULSION INTRAVENOUS at 12:30

## 2017-08-23 NOTE — IP AVS SNAPSHOT
MRN:[id number]                      After Visit Summary   8/23/2017    Bhavani White    MRN: [id number]           Thank you!     Thank you for choosing [facility] for your care. Our goal is always to provide you with excellent care. Hearing back from our patients is one way we can continue to improve our services. Please take a few minutes to complete the written survey that you may receive in the mail after you visit with us. Thank you!        Patient Information     Date Of Birth          1955        About your hospital stay     You were admitted on:  August 23, 2017 You last received care in the:  Same Day Surgery [facility]    You were discharged on:  August 23, 2017       Who to Call     For medical emergencies, please call 911.  For non-urgent questions about your medical care, please call your primary care provider or clinic, [phone]  For questions related to your surgery, please call your surgery clinic        Attending Provider     Provider Specialty    Yoandy Rios MD Urology       Primary Care Provider Office Phone # Fax #    Shawnee Kaur DO [place] [phone]      After Care Instructions     Discharge Instructions       Patient to arrange for follow up appointment in 6 weeks with either Jerry Garibay MD (urology fellow) or Amanda Dinero (urology PA)    Activity  - No strenuous exercise for 3-5 days.  - No lifting, pushing, pulling more than 15 pounds for 3-5 days.   - Do not strain with bowel movements.  - Do not drive until you can press the brake pedal quickly and fully without pain.   - Do not operate a motor vehicle while taking narcotic pain medications.   - Some blood in the urine is expected. Increase your fluid intake to help flush the blood out of your bladder    Medications  - No narcotic pain medications are required and over the counter tylenol should suffice.  Wean yourself off all pain medications as you are able.  - Some pain medications  "contain both tylenol (acetaminophen) and a narcotic (Norco, vicodin, percocet), do not take more than 4,000mg of Tylenol (acetaminophen) from all sources in any 24 hour period.  - Take over the counter fiber (metamucil or benefiber) and stool softeners (miralax, docusate or senna) to prevent postoperative constipation, but stop if you develop diarrhea.  - No driving or operating machinery while taking narcotic pain medications     Follow-Up:  - Follow up with Dr. Garibay (Dr. Rios's fellow)  Or Amanda Dinero (PA) in 6-8 weeks. Otherwise you can just follow up when your symptoms return for your next botox injection  - Call or return sooner than your regularly scheduled visit if you develop any of the following: fever (greater than 101.5), uncontrolled pain, uncontrolled nausea or vomiting, as well as worsening blood in the urine    Phone numbers:   - Monday through Friday 8am to 4:30pm: Call [phone] with questions or to schedule or confirm appointment.    - Nights or weekends: call the after hours emergency pager - [phone] and tell the  \"I would like to page the Urology Resident on call.\"  - For emergencies, call [phone]                  Further instructions from your care team       [facility], [place]  Same-Day Surgery   Adult Discharge Orders & Instructions     For 24 hours after surgery    1. Get plenty of rest.  A responsible adult must stay with you for at least 24 hours after you leave the hospital.   2. Do not drive or use heavy equipment.  If you have weakness or tingling, don't drive or use heavy equipment until this feeling goes away.  3. Do not drink alcohol.  4. Avoid strenuous or risky activities.  Ask for help when climbing stairs.   5. You may feel lightheaded.  IF so, sit for a few minutes before standing.  Have someone help you get up.   6. If you have nausea (feel sick to your stomach): Drink only clear liquids such as apple juice, ginger ale, broth or " "7-Up.  Rest may also help.  Be sure to drink enough fluids.  Move to a regular diet as you feel able.  7. You may have a slight fever. Call the doctor if your fever is over 100 F (37.7 C) (taken under the tongue) or lasts longer than 24 hours.  8. You may have a dry mouth, a sore throat, muscle aches or trouble sleeping.  These should go away after 24 hours.  9. Do not make important or legal decisions.   Call your doctor for any of the followin.  Signs of infection (fever, growing tenderness at the surgery site, a large amount of drainage or bleeding, severe pain, foul-smelling drainage, redness, swelling).    2. It has been over 8 to 10 hours since surgery and you are still not able to urinate (pass water).    3.  Headache for over 24 hours.    To contact a doctor, call Dr Lisa's office at [phone]   or:        [phone] and ask for the resident on call for   Urology (answered 24 hours a day)      Emergency Department:    [facility]: [phone]       (TTY for hearing impaired: [phone])              Pending Results     No orders found from 2017 to 2017.            Admission Information     Date & Time Provider Department Dept. Phone    2017 Yoandy Rios MD Same Day Surgery [facility] [phone]      Your Vitals Were     Blood Pressure Temperature Respirations Height Weight Pulse Oximetry    123/67 98.6  F (37  C) (Oral) 20 1.702 m (5' 7.01\") 128.7 kg (283 lb 11.7 oz) 96%    BMI (Body Mass Index)                   44.43 kg/m2           MyChart Information     [facility] gives you secure access to your electronic health record. If you see a primary care provider, you can also send messages to your care team and make appointments. If you have questions, please call your primary care clinic.  If you do not have a primary care provider, please call [phone] and they will assist you.        Care EveryWhere ID     This is your Care EveryWhere ID. This could be " used by other organizations to access your [facility] medical records  [id number]        Equal Access to Services     JOSE LUIS GOMEZ : Noni Lujan, zoraida conner, jw harkinsmabryan españa, rowan quinterossuzettesarah gilbert. So [facility] [phone].    ATENCIÓN: Si habla español, tiene a shay disposición servicios gratuitos de asistencia lingüística. Llame al [phone].    We comply with applicable federal civil rights laws and [place] laws. We do not discriminate on the basis of race, color, national origin, age, disability sex, sexual orientation or gender identity.               Review of your medicines      CONTINUE these medicines which have NOT CHANGED        Dose / Directions    * albuterol 108 (90 BASE) MCG/ACT Inhaler   Commonly known as:  PROAIR HFA/PROVENTIL HFA/VENTOLIN HFA   Used for:  Moderate persistent asthma with acute exacerbation        Dose:  2 puff   Inhale 2 puffs into the lungs every 6 hours as needed for shortness of breath / dyspnea or wheezing   Quantity:  1 Inhaler   Refills:  0       * albuterol (2.5 MG/3ML) 0.083% neb solution   Used for:  Moderate persistent asthma with acute exacerbation        Dose:  1 vial   Take 1 vial (2.5 mg) by nebulization every 4 hours as needed for shortness of breath / dyspnea or wheezing   Quantity:  30 vial   Refills:  1       aspirin 81 MG tablet   Used for:  Type 2 diabetes, HbA1c goal < 7% (H)        Take by mouth daily   Quantity:  30 tablet   Refills:  0       blood glucose monitoring lancets   Used for:  Type 2 diabetes mellitus with diabetic nephropathy (H)        Use to test blood sugar 1 times daily or as directed.   Quantity:  1 Box   Refills:  prn       blood glucose monitoring meter device kit   Used for:  Type 2 diabetes mellitus with diabetic nephropathy (H)        Use to test blood sugar 1 times daily or as directed.   Quantity:  1 kit   Refills:  0       buPROPion 300 MG 24 hr tablet   Commonly known as:  WELLBUTRIN  XL   Used for:  Moderate major depression (H)        Dose:  300 mg   Take 1 tablet (300 mg) by mouth every morning   Quantity:  90 tablet   Refills:  1       CYMBALTA PO        Dose:  120 mg   Take 120 mg by mouth daily   Refills:  0       diazepam 5 MG tablet   Commonly known as:  VALIUM   Used for:  Acute respiratory failure with hypoxia and hypercapnia (H)        Dose:  5 mg   Take 1 tablet (5 mg) by mouth every 8 hours as needed for anxiety or other (pain, muscle spasm)   Quantity:  30 tablet   Refills:  0       gabapentin 600 MG tablet   Commonly known as:  NEURONTIN        Dose:  1200 mg   1,200 mg 3 times daily   Refills:  2       levothyroxine 100 MCG tablet   Commonly known as:  SYNTHROID/LEVOTHROID   Used for:  Hypothyroidism due to acquired atrophy of thyroid        TAKE 1 TABLET(100 MCG) BY MOUTH DAILY   Quantity:  90 tablet   Refills:  0       lisinopril 2.5 MG tablet   Commonly known as:  PRINIVIL/Zestril   Used for:  Hypertension goal BP (blood pressure) < 140/90        Dose:  2.5 mg   Take 1 tablet (2.5 mg) by mouth daily   Quantity:  90 tablet   Refills:  0       metFORMIN 500 MG 24 hr tablet   Commonly known as:  GLUCOPHAGE-XR   Used for:  Type 2 diabetes mellitus with diabetic nephropathy (H)        TAKE 2 TABLETS(1000 MG) BY MOUTH TWICE DAILY WITH MEALS   Quantity:  360 tablet   Refills:  0       methadone 10 MG tablet   Commonly known as:  DOLOPHINE   Used for:  Acute respiratory failure with hypoxia and hypercapnia (H)        Take 2 pills TID   Quantity:  150 tablet   Refills:  0       MIRALAX PO        1 capful daily prn   Refills:  0       MULTIVITAMIN PO        1 a day   Refills:  0       nystatin 613454 UNIT/GM Powd   Commonly known as:  MYCOSTATIN   Used for:  Candidiasis of skin        Apply topically 3 times daily as needed   Quantity:  30 g   Refills:  1       ondansetron 4 MG ODT tab   Commonly known as:  ZOFRAN ODT   Used for:  Nausea and vomiting, intractability of vomiting not  specified, unspecified vomiting type        Dose:  4-8 mg   Take 1-2 tablets (4-8 mg) by mouth every 8 hours as needed for nausea   Quantity:  20 tablet   Refills:  1       * order for DME   Used for:  Wheelchair bound, Ankylosing spondylitis (H), Spinal stenosis in cervical region, Lumbar spinal stenosis, Fibromyalgia, Chronic low back pain        Equipment being ordered: Hospital Bed, total electric (head, foot, and height adjustments), with any type side rails, with mattress   Quantity:  1 each   Refills:  0       * order for DME   Used for:  Wheelchair bound, Ankylosing spondylitis (H), Spinal stenosis in cervical region, Lumbar spinal stenosis, Fibromyalgia, Chronic low back pain        Equipment being ordered: Motorized Wheelchair   Quantity:  1 each   Refills:  0       * order for DME   Used for:  Wheelchair bound, Ankylosing spondylitis (H), Spinal stenosis in cervical region, Lumbar spinal stenosis, Fibromyalgia, Chronic low back pain        Equipment being ordered: Trapeze bar for hospital bed   Quantity:  1 each   Refills:  0       * order for DME   Used for:  Moderate persistent asthma with acute exacerbation        Equipment being ordered: Nebulizer   Quantity:  1 Package   Refills:  0       * order for DME   Used for:  Acute respiratory failure with hypoxia and hypercapnia (H)        Equipment being ordered: BIPAP.  15/5, Rate of 12, 2L O2 to keep sats 90-95%.   Quantity:  1 each   Refills:  0       oxyCODONE 10 MG IR tablet   Commonly known as:  ROXICODONE        Dose:  10 mg   Take 10 mg by mouth   Refills:  0       PYRIDIUM PO        Dose:  2 tablet   Take 2 tablets by mouth 3 times daily as needed Dose unknown   Refills:  0       senna-docusate 8.6-50 MG per tablet   Commonly known as:  SENOKOT-S;PERICOLACE   Used for:  Acute respiratory failure with hypoxia and hypercapnia (H)        Dose:  2 tablet   Take 2 tablets by mouth 2 times daily   Quantity:  100 tablet   Refills:  0       simvastatin 20  MG tablet   Commonly known as:  ZOCOR   Used for:  Hyperlipidemia LDL goal <100        Dose:  20 mg   Take 1 tablet (20 mg) by mouth At Bedtime   Quantity:  90 tablet   Refills:  0       tiZANidine 4 MG tablet   Commonly known as:  ZANAFLEX        Dose:  4 mg   Take 1 tablet (4 mg) by mouth 3 times daily   Quantity:  90 tablet   Refills:  0       traZODone 100 MG tablet   Commonly known as:  DESYREL   Used for:  Insomnia        Dose:  100 mg   Take 1 tablet (100 mg) by mouth At Bedtime   Quantity:  90 tablet   Refills:  1       * Notice:  This list has 7 medication(s) that are the same as other medications prescribed for you. Read the directions carefully, and ask your doctor or other care provider to review them with you.             Protect others around you: Learn how to safely use, store and throw away your medicines at www.disposemymeds.org.             Medication List: This is a list of all your medications and when to take them. Check marks below indicate your daily home schedule. Keep this list as a reference.      Medications           Morning Afternoon Evening Bedtime As Needed    * albuterol 108 (90 BASE) MCG/ACT Inhaler   Commonly known as:  PROAIR HFA/PROVENTIL HFA/VENTOLIN HFA   Inhale 2 puffs into the lungs every 6 hours as needed for shortness of breath / dyspnea or wheezing                                * albuterol (2.5 MG/3ML) 0.083% neb solution   Take 1 vial (2.5 mg) by nebulization every 4 hours as needed for shortness of breath / dyspnea or wheezing                                aspirin 81 MG tablet   Take by mouth daily                                blood glucose monitoring lancets   Use to test blood sugar 1 times daily or as directed.                                blood glucose monitoring meter device kit   Use to test blood sugar 1 times daily or as directed.                                buPROPion 300 MG 24 hr tablet   Commonly known as:  WELLBUTRIN XL   Take 1 tablet (300 mg) by  mouth every morning                                CYMBALTA PO   Take 120 mg by mouth daily                                diazepam 5 MG tablet   Commonly known as:  VALIUM   Take 1 tablet (5 mg) by mouth every 8 hours as needed for anxiety or other (pain, muscle spasm)                                gabapentin 600 MG tablet   Commonly known as:  NEURONTIN   1,200 mg 3 times daily                                levothyroxine 100 MCG tablet   Commonly known as:  SYNTHROID/LEVOTHROID   TAKE 1 TABLET(100 MCG) BY MOUTH DAILY                                lisinopril 2.5 MG tablet   Commonly known as:  PRINIVIL/Zestril   Take 1 tablet (2.5 mg) by mouth daily                                metFORMIN 500 MG 24 hr tablet   Commonly known as:  GLUCOPHAGE-XR   TAKE 2 TABLETS(1000 MG) BY MOUTH TWICE DAILY WITH MEALS                                methadone 10 MG tablet   Commonly known as:  DOLOPHINE   Take 2 pills TID                                MIRALAX PO   1 capful daily prn                                MULTIVITAMIN PO   1 a day                                nystatin 979415 UNIT/GM Powd   Commonly known as:  MYCOSTATIN   Apply topically 3 times daily as needed                                ondansetron 4 MG ODT tab   Commonly known as:  ZOFRAN ODT   Take 1-2 tablets (4-8 mg) by mouth every 8 hours as needed for nausea                                * order for DME   Equipment being ordered: Hospital Bed, total electric (head, foot, and height adjustments), with any type side rails, with mattress                                * order for DME   Equipment being ordered: Motorized Wheelchair                                * order for DME   Equipment being ordered: Trapeze bar for hospital bed                                * order for DME   Equipment being ordered: Nebulizer                                * order for DME   Equipment being ordered: BIPAP.  15/5, Rate of 12, 2L O2 to keep sats 90-95%.                                 oxyCODONE 10 MG IR tablet   Commonly known as:  ROXICODONE   Take 10 mg by mouth                                PYRIDIUM PO   Take 2 tablets by mouth 3 times daily as needed Dose unknown                                senna-docusate 8.6-50 MG per tablet   Commonly known as:  SENOKOT-S;PERICOLACE   Take 2 tablets by mouth 2 times daily                                simvastatin 20 MG tablet   Commonly known as:  ZOCOR   Take 1 tablet (20 mg) by mouth At Bedtime                                tiZANidine 4 MG tablet   Commonly known as:  ZANAFLEX   Take 1 tablet (4 mg) by mouth 3 times daily                                traZODone 100 MG tablet   Commonly known as:  DESYREL   Take 1 tablet (100 mg) by mouth At Bedtime                                * Notice:  This list has 7 medication(s) that are the same as other medications prescribed for you. Read the directions carefully, and ask your doctor or other care provider to review them with you.

## 2017-08-23 NOTE — IP AVS SNAPSHOT
Same Day Surgery [address] [address]    Phone:  [phone]                                       After Visit Summary   8/23/2017    Bhavani White    MRN: [id number]           After Visit Summary Signature Page     I have received my discharge instructions, and my questions have been answered. I have discussed any challenges I see with this plan with the nurse or doctor.    ..........................................................................................................................................  Patient/Patient Representative Signature      ..........................................................................................................................................  Patient Representative Print Name and Relationship to Patient    ..................................................               ................................................  Date                                            Time    ..........................................................................................................................................  Reviewed by Signature/Title    ...................................................              ..............................................  Date                                                            Time

## 2017-08-23 NOTE — OP NOTE
PRE-OPERATIVE DIAGNOSIS:   1.  Neurogenic bladder    POST-OPERATIVE DIAGNOSIS:  1.  Neurogenic bladder    PROCEDURE:    1. Cystourethroscopy.  2. Cystometrogram  3. injection of botulinum toxin A 200units in 20cc sterile saline  4. SPT change (24F)      SURGEON:  Yoandy Rios MD; available for the entire case, present for key portions of the procedure    FELLOW: Jerry Garibay MD    ESTIMATED BLOOD LOSS:  5 mL    DRAINS: new SPT changed (24F)    SIGNIFICANT FINDINGS: 120 CC bladder capacity  No bladder tumors or lesions.      OPERATIVE INDICATIONS:  61 year old female with neurogenic bladder secondary of unclear etiology - possibly due to spinal cord pathology/surgeries. However, looking back at records from 2009 they report her having an indwelling catheter due to immobility and bedbound status, unable to transfer. Now reporting leakage per urethra and SP site with bladder spasms.  she elects to proceed with botulinum toxin injection understanding the risks for urinary retention, infection, pain, bleeding, need for future procedures and risks of anesthesia.     OPERATIVE DETAILS:  The patient was taken to the operating room in her usual state of health.   She was positioned in modified dorsal lithotomy with yellowfin stirrups.  Her genitalia were prepped and draped in the standard fashion. A time-out was performed to ensure proper patient, procedure and positioning.  The patient received appropriate IV antibiotics prior to the procedure.    Cystometrogram revelaed 120 cc bladder.    SPT was changed to another 24F     A 21-[place] Valdovinos injection cystoscope was placed into the bladder.  The bladder mucosa appeared to be normal without stones, tumors or diverticulum on 360 degree inspection. The ureteral orifices were in the normal orthotopic position bilaterally.  We mixed 2 vials of 100 U botulinum toxin A into 20 mL of injectable saline for a total of (1 units/mL).  We performed a template injection procedure with 5  columns of 4 injections. All injections were placed deep to the mucosa into the detrusor muscle.  We then emptied her bladder to re-inspect our injection sites and found that there was a minimal amount of blood. Her bladder was then emptied again. The patient was returned to supine position and transported to recovery in stable condition.    PLAN: f/u in 1 month in clinic to see if the botox injection helped her

## 2017-08-23 NOTE — ANESTHESIA CARE TRANSFER NOTE
Patient: Bhavani White    Procedure(s):  Cystoscopy, Botox Injection Into The Bladder  Latex Allergy  - Wound Class: II-Clean Contaminated    Diagnosis: Neurogenic Bladder   Diagnosis Additional Information: No value filed.    Anesthesia Type:   MAC     Note:  Airway :Face Mask  Patient transferred to:Phase II  Comments: VSS. Breathing spontaneously at a regular rate with adequate tidal volumes and maintaining O2 sats on 6L facemask. Denies nausea or pain. No apparent complications from anesthesia.     Tyler Suarez DO  CA-1        Vitals: (Last set prior to Anesthesia Care Transfer)    CRNA VITALS  8/23/2017 1247 - 8/23/2017 1325      8/23/2017             Pulse: 98    SpO2: 99 %                Electronically Signed By: Tyler Suarez DO  August 23, 2017  1:25 PM

## 2017-08-23 NOTE — OR NURSING
When being roomed, pt required a lot of assistance and extra time to get settled and changed with NST;  Time required 1806-0654.

## 2017-08-23 NOTE — ANESTHESIA POSTPROCEDURE EVALUATION
Patient: Bhavani White    Procedure(s):  Cystoscopy, Botox Injection Into The Bladder  Latex Allergy  - Wound Class: II-Clean Contaminated    Diagnosis:Neurogenic Bladder   Diagnosis Additional Information: No value filed.    Anesthesia Type:  MAC    Note:  Anesthesia Post Evaluation    Patient location during evaluation: Phase 2  Patient participation: Able to fully participate in evaluation  Level of consciousness: awake and alert  Pain management: adequate  Airway patency: patent  Cardiovascular status: acceptable  Respiratory status: acceptable  Hydration status: acceptable  PONV: none     Anesthetic complications: None          Last vitals:  Vitals:    08/23/17 1321 08/23/17 1330 08/23/17 1400   BP: 123/74 123/67 (!) 148/92   Resp: 20 20 20   Temp: 37  C (98.6  F)     SpO2: 100% 96%          Electronically Signed By: Romain Hewitt MD  August 23, 2017  2:21 PM

## 2017-08-23 NOTE — DISCHARGE INSTRUCTIONS
[facility]  Same-Day Surgery   Adult Discharge Orders & Instructions     For 24 hours after surgery    1. Get plenty of rest.  A responsible adult must stay with you for at least 24 hours after you leave the hospital.   2. Do not drive or use heavy equipment.  If you have weakness or tingling, don't drive or use heavy equipment until this feeling goes away.  3. Do not drink alcohol.  4. Avoid strenuous or risky activities.  Ask for help when climbing stairs.   5. You may feel lightheaded.  IF so, sit for a few minutes before standing.  Have someone help you get up.   6. If you have nausea (feel sick to your stomach): Drink only clear liquids such as apple juice, ginger ale, broth or 7-Up.  Rest may also help.  Be sure to drink enough fluids.  Move to a regular diet as you feel able.  7. You may have a slight fever. Call the doctor if your fever is over 100 F (37.7 C) (taken under the tongue) or lasts longer than 24 hours.  8. You may have a dry mouth, a sore throat, muscle aches or trouble sleeping.  These should go away after 24 hours.  9. Do not make important or legal decisions.   Call your doctor for any of the followin.  Signs of infection (fever, growing tenderness at the surgery site, a large amount of drainage or bleeding, severe pain, foul-smelling drainage, redness, swelling).    2. It has been over 8 to 10 hours since surgery and you are still not able to urinate (pass water).    3.  Headache for over 24 hours.    To contact a doctor, call Dr Lisa's office at [phone]   or:        [phone] and ask for the resident on call for   Urology (answered 24 hours a day)      Emergency Department:    [facility]: [phone]       (TTY for hearing impaired: [phone])

## 2017-08-23 NOTE — ANESTHESIA PREPROCEDURE EVALUATION
Anesthesia Evaluation     . Pt has had prior anesthetic.     No history of anesthetic complications          ROS/MED HX    ENT/Pulmonary:     (+)sleep apnea, Intermittent asthma uses CPAP , . .    Neurologic:  - neg neurologic ROS     Cardiovascular:     (+) hypertension----. : . . . :. . Previous cardiac testing Echodate:results:4/2016: Interpretation Summary  REST ECG-NSR with mixed IVCD: iLBBB, RBBB  Left ventricular hypertrophy visualized by 2-D imaging.  REST Echo: EF >65%, prox septal thickening/LVH. No LV outflow obstruction at  rest. LIMITED IMAGE QUALITY THROUGHOUT STUDY DUE TO PATIENT BODY HABITUS.  THIS WILL REDUCE SENSITIVITY OF TEST FOR DIAGNOSIS OF CAD  Patient noted chest pain with dobutamine, but there were no ST segment  changes and the echo pictures show hyperdynamic LV with cavity obliteration,  EF>70% and no regional wall motion abnormalities. No ischemia identified on  this test. (limited image quality due to patient body habitus)date: results:ECG reviewed date: results:6/2017: SR, RBBB date: results:          METS/Exercise Tolerance:     Hematologic:     (+) Anemia, History of Transfusion -      Musculoskeletal:   (+) arthritis, , , -       GI/Hepatic:     (+) Other GI/Hepatic ulcer disease     (-) GERD   Renal/Genitourinary:  - ROS Renal section negative       Endo: Comment: Iatrogenic cushings - not on steroids      (+) type II DM Last HgA1c: 7.7 date: 6/2017 Not using insulin - not using insulin pump not previously admitted for DM/DKA thyroid problem hypothyroidism, Obesity, .      Psychiatric:     (+) psychiatric history anxiety and depression      Infectious Disease:   (+) MRSA, Other Infectious Disease frequent UTI      Malignancy:      - no malignancy   Other:    (+) No chance of pregnancy C-spine cleared: N/A, H/O Chronic Pain,H/O chronic opiod use , other significant disability Wheelchair bound                   Physical Exam  Normal systems: cardiovascular and pulmonary    Airway    Mallampati: II  TM distance: >3 FB  Neck ROM: limited    Dental   (+) upper dentures and lower dentures    Cardiovascular       Pulmonary                     Anesthesia Plan      History & Physical Review  History and physical reviewed and following examination; no interval change.    ASA Status:  3 .    NPO Status:  > 8 hours    Plan for MAC with Intravenous and Propofol induction. Maintenance will be TIVA (propofol and ketamine).  Reason for MAC:  Deep or markedly invasive procedure (G8) and Extreme anxiety (QS)  PONV prophylaxis:  Ondansetron (or other 5HT-3)       Postoperative Care  Postoperative pain management:  IV analgesics, Oral pain medications and Multi-modal analgesia.      Consents  Anesthetic plan, risks, benefits and alternatives discussed with:  Patient..        Procedure: Procedure(s):  Cystoscopy, Botox Injection Into The Bladder  Latex Allergy  - Wound Class: II-Clean Contaminated    HPI: 61 year old female with bladder spasms who requires anesthesia for Procedure(s):  Cystoscopy, Botox Injection Into The Bladder  Latex Allergy  - Wound Class: II-Clean Contaminated.     PMHx is complicated by ANIYA on CPAP, obesity, DM2 non insulin dependent, frequent UTIs, iatrogenic cushings not on steroids, HTN, anxiety, depression, arthritis, chronic pain, and s/p cervical fusion.    PMH/PSH:  Past Medical History:   Diagnosis Date     Anxiety      Asthma      Depression      DM (diabetes mellitus) (H)      Frequent UTI      History of blood transfusion      History of ulcer disease 2014    She notes from NSAIDs; nearly . Discussed a hospitalization at [facility] for this.     Hypertension      Hypothyroid      Iatrogenic Cushing's disease (H)     off prednisone now     Morbid obesity (H)      MRSA (methicillin resistant staph aureus) culture positive 2012    repeat cx 10/12/2012 no staph mentioned.     Osteoarthritis     multiple joings.     Other chronic pain      Sleep apnea     No longer  uses cpap.       Past Surgical History:   Procedure Laterality Date     ABDOMEN SURGERY       C LAMINECTOMY,FACETECTOMY,LUMBAR  1982     C TOTAL KNEE ARTHROPLASTY  1999    left     DISCECTOMY, FUSION CERVICAL ANTERIOR THREE+ LEVELS, COMBINED Left 5/3/2016    Procedure: COMBINED DISCECTOMY, FUSION CERVICAL ANTERIOR THREE+ LEVELS;  Surgeon: Jasvir Torres MD;  Location: [facility] OR     GENITOURINARY SURGERY      suprapubic catheter     HERNIA REPAIR  1957    double hernia     HYSTERECTOMY, PAP NO LONGER INDICATED      CELIA and large ovarian tumor removed     SURGICAL HISTORY OF -       left cataract surgery     SURGICAL HISTORY OF -   12/14    right cataract surgery and left laser revision     SURGICAL HISTORY OF -   March 2015    left carpal tunnel release     TONSILLECTOMY  1974         No current facility-administered medications on file prior to encounter.   Current Outpatient Prescriptions on File Prior to Encounter:  buPROPion (WELLBUTRIN XL) 300 MG 24 hr tablet Take 1 tablet (300 mg) by mouth every morning   simvastatin (ZOCOR) 20 MG tablet Take 1 tablet (20 mg) by mouth At Bedtime   lisinopril (PRINIVIL/ZESTRIL) 2.5 MG tablet Take 1 tablet (2.5 mg) by mouth daily   levothyroxine (SYNTHROID/LEVOTHROID) 100 MCG tablet TAKE 1 TABLET(100 MCG) BY MOUTH DAILY   ondansetron (ZOFRAN ODT) 4 MG ODT tab Take 1-2 tablets (4-8 mg) by mouth every 8 hours as needed for nausea   methadone (DOLOPHINE) 10 MG tablet Take 2 pills TID   metFORMIN (GLUCOPHAGE-XR) 500 MG 24 hr tablet TAKE 2 TABLETS(1000 MG) BY MOUTH TWICE DAILY WITH MEALS   traZODone (DESYREL) 100 MG tablet Take 1 tablet (100 mg) by mouth At Bedtime   diazepam (VALIUM) 5 MG tablet Take 1 tablet (5 mg) by mouth every 8 hours as needed for anxiety or other (pain, muscle spasm)   senna-docusate (SENOKOT-S;PERICOLACE) 8.6-50 MG per tablet Take 2 tablets by mouth 2 times daily   gabapentin (NEURONTIN) 600 MG tablet 1,200 mg 3 times daily    nystatin (MYCOSTATIN) 391171  "UNIT/GM POWD Apply topically 3 times daily as needed   order for DME Equipment being ordered: Hospital Bed, total electric (head, foot, and height adjustments), with any type side rails, with mattress   order for DME Equipment being ordered: Motorized Wheelchair   order for DME Equipment being ordered: Trapeze bar for hospital bed   Phenazopyridine HCl (PYRIDIUM PO) Take 2 tablets by mouth 3 times daily as needed Dose unknown   DULoxetine HCl (CYMBALTA PO) Take 120 mg by mouth daily    tiZANidine (ZANAFLEX) 4 MG tablet Take 1 tablet (4 mg) by mouth 3 times daily   MIRALAX OR 1 capful daily prn   MULTIVITAMIN OR 1 a day   order for DME Equipment being ordered: BIPAP. 15/5, Rate of 12, 2L O2 to keep sats 90-95%.   albuterol (PROAIR HFA, PROVENTIL HFA, VENTOLIN HFA) 108 (90 BASE) MCG/ACT inhaler Inhale 2 puffs into the lungs every 6 hours as needed for shortness of breath / dyspnea or wheezing   order for DME Equipment being ordered: Nebulizer   albuterol (2.5 MG/3ML) 0.083% nebulizer solution Take 1 vial (2.5 mg) by nebulization every 4 hours as needed for shortness of breath / dyspnea or wheezing   blood glucose monitoring (ACCU-CHEK FASTCLIX) lancets Use to test blood sugar 1 times daily or as directed.   blood glucose monitoring (ACCU-CHEK SP SMARTVIEW) meter device kit Use to test blood sugar 1 times daily or as directed.   aspirin 81 MG tablet Take by mouth daily       SH:   Social History   Substance Use Topics     Smoking status: Never Smoker     Smokeless tobacco: Never Used     Alcohol use No       Allergies:   Allergies   Allergen Reactions     Povidone Iodine      \"mild skin irritation\" per patient report     Toradol [Ketorolac] Other (See Comments)     Pt gets nightmares       NPO Status: Per ASA Guidelines    Labs:    Blood Bank:  Lab Results   Component Value Date    ABO Canceled, Test credited 05/04/2016    RH Canceled, Test credited 05/04/2016    AS Canceled, Test credited 05/04/2016     BMP:  Recent " Labs   Lab Test  08/23/17   1044  07/26/17   1532   NA   --   136   POTASSIUM  4.4  4.7   CHLORIDE   --   97   CO2   --   34*   BUN   --   17   CR   --   0.80   GLC   --   208*   DORY   --   9.3     CBC:   Recent Labs   Lab Test  07/26/17   1532   WBC  10.6   RBC  5.44*   HGB  14.5   HCT  45.4   MCV  84   MCH  26.7   MCHC  31.9   RDW  13.6   PLT  211     Coags:  Recent Labs   Lab Test  07/26/17   1532  04/18/16   1528   INR  0.97  1.00   PTT   --   25     To be discussed with staff.   - ASA 3  - MAC anesthesia (deep sedation) with standard ASA monitors, IV induction, balanced anesthetic  - PIV  - Antibiotics per surgery  - PONV prophylaxis  - Using propofol, ketamine, and versed given history of ANIYA, chronic pain, and anxiety  - CMAC in the room incase conversion to general given cervical spine fusion with Limited neck ROM    Tyler Suarez, DO  [facility]   Department of Anesthesiology  P: [phone]              History and physical assessed; Patient examined. I have reviewed and agree with this pre-op assessment and anesthetic plan with addendums as necessary.     Risks and alternatives presented and discussed. Patient agrees. All questions answered.        Romain Hewitt MD  Staff Anesthesiologist  *[phone]

## 2017-08-29 ENCOUNTER — CARE COORDINATION (OUTPATIENT)
Dept: UROLOGY | Facility: CLINIC | Age: 62
End: 2017-08-29

## 2017-08-29 NOTE — PROGRESS NOTES
Urology Postop Phone Note:    Ms. Bhavani White is a 61 year old female who underwent Cystourethroscopy, Cystometrogram, injection of botulinum toxin A 200units in 20cc sterile saline, SPT change (24F) on 8.23.17 with Dr. Rios for a history of bladder spasms, neurogenic bladder.  Her postoperative course was unremarkable and the patient was d/c to home on 8.23.17.    Fevers/chills: Patient denies any fever or chills.  Eating/drinking: Patient is able to eat and drink without any complaints.  Bowel habits: Patient reports having a normal bowel movement.  Urine output Lundberg catheter Color yellow Clarity clear Odor no  Pain: Patient reports pain as a 0 out of 10.  The pain is located na  Narcotics: The patient denies taking any pain medication.  Antibiotics: No    Follow up appointment scheduled: none at this time. Next botox due in ~6 months.    She states her bladder spasms are gone but she is now incontinent via her urethra. Currently has a SPT.  Will discuss with Dr Rios.    Informed the patient of the clinic triage nursing # ([phone] option 3) and urology resident on call # for after hours concerns.    Ira Suárez, RN-BC, BSN  Care Coordinator - Reconstructive Urology

## 2017-08-31 DIAGNOSIS — E11.21 TYPE 2 DIABETES MELLITUS WITH DIABETIC NEPHROPATHY (H): ICD-10-CM

## 2017-08-31 NOTE — TELEPHONE ENCOUNTER
metFORMIN (GLUCOPHAGE-XR) 500 MG 24 hr tablet    Last Written Prescription Date: 05/25/17  Last Fill Quantity: 360, # refills: 0  Last Office Visit with [facility], [facility] or [facility] prescribing provider:  Dr. Dueñas, 08/09/17        BP Readings from Last 3 Encounters:   08/23/17 133/89   08/09/17 130/80   07/31/17 (!) 168/92     Lab Results   Component Value Date    MICROL 97 11/18/2016     Lab Results   Component Value Date    UMALCR 124.42 11/18/2016     Creatinine   Date Value Ref Range Status   07/26/2017 0.80 0.52 - 1.04 mg/dL Final   ]  GFR Estimate   Date Value Ref Range Status   07/26/2017 73 >60 mL/min/1.7m2 Final     Comment:     Non  GFR Calc   06/28/2017 64 >60 mL/min/1.7m2 Final   11/18/2016 62 >60 mL/min/1.7m2 Final     Comment:     Non  GFR Calc     GFR Estimate If Black   Date Value Ref Range Status   07/26/2017 88 >60 mL/min/1.7m2 Final     Comment:      GFR Calc   06/28/2017 77 >60 mL/min/1.7m2 Final   11/18/2016 75 >60 mL/min/1.7m2 Final     Comment:      GFR Calc     Lab Results   Component Value Date    CHOL 147 11/18/2016     Lab Results   Component Value Date    HDL 48 11/18/2016     Lab Results   Component Value Date    LDL 64 11/18/2016     Lab Results   Component Value Date    TRIG 174 11/18/2016     Lab Results   Component Value Date    CHOLHDLRATIO 2.3 12/12/2014     Lab Results   Component Value Date    AST 22 07/26/2017     Lab Results   Component Value Date    ALT 29 07/26/2017     Lab Results   Component Value Date    A1C 7.7 06/01/2017    A1C 6.3 11/18/2016    A1C 7.7 05/16/2016    A1C 7.9 05/04/2016    A1C 7.9 04/18/2016     Potassium   Date Value Ref Range Status   08/23/2017 4.4 3.4 - 5.3 mmol/L Final

## 2017-09-01 RX ORDER — METFORMIN HCL 500 MG
1000 TABLET, EXTENDED RELEASE 24 HR ORAL 2 TIMES DAILY WITH MEALS
Qty: 360 TABLET | Refills: 0 | Status: SHIPPED | OUTPATIENT
Start: 2017-09-01 | End: 2017-10-20

## 2017-09-01 NOTE — TELEPHONE ENCOUNTER
Prescription approved per [facility] Refill Protocol  Shaniqua Nelson RN BS

## 2017-09-08 ENCOUNTER — CARE COORDINATION (OUTPATIENT)
Dept: UROLOGY | Facility: CLINIC | Age: 62
End: 2017-09-08

## 2017-09-08 DIAGNOSIS — R39.15 URINARY URGENCY: Primary | ICD-10-CM

## 2017-09-08 DIAGNOSIS — R35.0 URINARY FREQUENCY: ICD-10-CM

## 2017-09-08 NOTE — PROGRESS NOTES
Patient is s/p Cystourethroscopy, Cystometrogram, injection of botulinum toxin A 200units in 20cc sterile saline, SPT change (24F) on 8.23.17 with Dr. Rios.    She states the botox worked very well and has no bladder spasms.  However, she is now having urinary incontinence as well as urinary urgency and frequency.    No fever, chills, nausea, vomiting, or pain.    Will check a UA/UC to rule out infection.  Discuss with Dr Rios.    Ira Suárez, THANG-BC, BSN  Care Coordinator - Reconstructive Urology

## 2017-09-11 DIAGNOSIS — R35.0 URINARY FREQUENCY: ICD-10-CM

## 2017-09-11 DIAGNOSIS — R39.15 URINARY URGENCY: ICD-10-CM

## 2017-09-11 LAB
ALBUMIN UR-MCNC: NEGATIVE MG/DL
APPEARANCE UR: CLEAR
BACTERIA #/AREA URNS HPF: ABNORMAL /HPF
BILIRUB UR QL STRIP: NEGATIVE
COLOR UR AUTO: YELLOW
GLUCOSE UR STRIP-MCNC: NEGATIVE MG/DL
HGB UR QL STRIP: NEGATIVE
KETONES UR STRIP-MCNC: NEGATIVE MG/DL
LEUKOCYTE ESTERASE UR QL STRIP: ABNORMAL
NITRATE UR QL: POSITIVE
NON-SQ EPI CELLS #/AREA URNS LPF: ABNORMAL /LPF
PH UR STRIP: 5.5 PH (ref 5–7)
RBC #/AREA URNS AUTO: ABNORMAL /HPF
SOURCE: ABNORMAL
SP GR UR STRIP: 1.01 (ref 1–1.03)
UROBILINOGEN UR STRIP-ACNC: 0.2 EU/DL (ref 0.2–1)
WBC #/AREA URNS AUTO: ABNORMAL /HPF

## 2017-09-11 PROCEDURE — 87086 URINE CULTURE/COLONY COUNT: CPT | Performed by: FAMILY MEDICINE

## 2017-09-11 PROCEDURE — 81001 URINALYSIS AUTO W/SCOPE: CPT | Performed by: FAMILY MEDICINE

## 2017-09-12 LAB
BACTERIA SPEC CULT: ABNORMAL
SPECIMEN SOURCE: ABNORMAL

## 2017-09-14 ENCOUNTER — TELEPHONE (OUTPATIENT)
Dept: UROLOGY | Facility: CLINIC | Age: 62
End: 2017-09-14

## 2017-09-14 DIAGNOSIS — N39.0 URINARY TRACT INFECTION: Primary | ICD-10-CM

## 2017-09-14 RX ORDER — CIPROFLOXACIN 500 MG/1
500 TABLET, FILM COATED ORAL 2 TIMES DAILY
Qty: 10 TABLET | Refills: 0 | Status: SHIPPED | OUTPATIENT
Start: 2017-09-14 | End: 2018-02-15

## 2017-09-14 NOTE — TELEPHONE ENCOUNTER
----- Message from Ira Suárez RN sent at 9/12/2017 10:12 AM CDT -----  Regarding: urine culture results  Can you look at this lady's UC results from Monday?  She was having incontinence after her botox injections.  UA was (+).  She has a catheter.    Thank you!!

## 2017-09-16 DIAGNOSIS — I10 HYPERTENSION GOAL BP (BLOOD PRESSURE) < 140/90: ICD-10-CM

## 2017-09-16 DIAGNOSIS — E78.5 HYPERLIPIDEMIA LDL GOAL <100: ICD-10-CM

## 2017-09-16 NOTE — TELEPHONE ENCOUNTER
Pending Prescriptions:                       Disp   Refills    lisinopril (PRINIVIL/ZESTRIL) 2.5 MG tabl*90 tab*             Sig: Take 1 tablet by mouth  daily    simvastatin (ZOCOR) 20 MG tablet [Pharmac*90 tab*             Sig: Take 1 tablet by mouth at  bedtime        LISINOPRIL      Last Written Prescription Date: 7/29/2017  Last Fill Quantity: 90, # refills: 0  Last [facility] or  Health prescribing provider: 8/9/2017[place]         Potassium   Date Value Ref Range Status   08/23/2017 4.4 3.4 - 5.3 mmol/L Final     Creatinine   Date Value Ref Range Status   07/26/2017 0.80 0.52 - 1.04 mg/dL Final     BP Readings from Last 3 Encounters:   08/23/17 133/89   08/09/17 130/80   07/31/17 (!) 168/92         SIMVASTATIN     Last Written Prescription Date: 7/29/2017  Last Fill Quantity: 90, # refills: 0  Last [place] Visit with [facility] or  Health prescribing provider: 8/9/2017[place]         Lab Results   Component Value Date    CHOL 147 11/18/2016     Lab Results   Component Value Date    HDL 48 11/18/2016     Lab Results   Component Value Date    LDL 64 11/18/2016     Lab Results   Component Value Date    TRIG 174 11/18/2016     Lab Results   Component Value Date    CHOLHDLRATIO 2.3 12/12/2014

## 2017-09-19 RX ORDER — LISINOPRIL 2.5 MG/1
2.5 TABLET ORAL DAILY
Qty: 90 TABLET | Refills: 1 | Status: SHIPPED | OUTPATIENT
Start: 2017-09-19 | End: 2017-12-14

## 2017-09-19 RX ORDER — SIMVASTATIN 20 MG
20 TABLET ORAL AT BEDTIME
Qty: 90 TABLET | Refills: 0 | Status: SHIPPED | OUTPATIENT
Start: 2017-09-19 | End: 2017-12-01

## 2017-09-19 NOTE — TELEPHONE ENCOUNTER
Pres[place] [facility] [place]  Shaniqua Nelson RN BS

## 2017-09-27 DIAGNOSIS — E03.4 HYPOTHYROIDISM DUE TO ACQUIRED ATROPHY OF THYROID: ICD-10-CM

## 2017-09-27 NOTE — TELEPHONE ENCOUNTER
levothyroxine (SYNTHROID/LEVOTHROID) 100 MCG tablet    Last Written Prescription Date: 06/15/2017  Last Quantity: 90,09/08/2017 # refills: 0  Last Office Visit with [facility], [facility] or [facility] prescribing provider: 08/09/2017-Dr Dueñas   Next 5 appointments (look out 90 days)     Oct 06, 2017  2:40 PM CDT   [facility]    [address] [place] [id number] [phone]                   TSH   Date Value Ref Range Status   11/18/2016 1.32 0.40 - 4.00 mU/L Final

## 2017-09-29 RX ORDER — LEVOTHYROXINE SODIUM 100 UG/1
100 TABLET ORAL DAILY
Qty: 90 TABLET | Refills: 0 | Status: SHIPPED | OUTPATIENT
Start: 2017-09-29 | End: 2017-12-14

## 2017-09-29 NOTE — TELEPHONE ENCOUNTER
Medication is being filled for 1 time refill only due to:  has upcoming appt, will recheck labs     Shaniqua Nelson RN, BS  Clinical Nurse Triage.

## 2017-10-20 DIAGNOSIS — E11.21 TYPE 2 DIABETES MELLITUS WITH DIABETIC NEPHROPATHY (H): ICD-10-CM

## 2017-10-23 RX ORDER — METFORMIN HCL 500 MG
500 TABLET, EXTENDED RELEASE 24 HR ORAL 2 TIMES DAILY WITH MEALS
Qty: 360 TABLET | Refills: 0 | Status: SHIPPED | OUTPATIENT
Start: 2017-10-23 | End: 2017-12-14

## 2017-10-23 NOTE — TELEPHONE ENCOUNTER
Prescription approved per [facility] Refill Protocol  Shaniqua Nelson RN BS

## 2017-10-27 ENCOUNTER — OFFICE VISIT (OUTPATIENT)
Dept: FAMILY MEDICINE | Facility: CLINIC | Age: 62
End: 2017-10-27
Payer: COMMERCIAL

## 2017-10-27 VITALS
WEIGHT: 277 LBS | DIASTOLIC BLOOD PRESSURE: 77 MMHG | HEIGHT: 67 IN | OXYGEN SATURATION: 96 % | HEART RATE: 91 BPM | SYSTOLIC BLOOD PRESSURE: 121 MMHG | TEMPERATURE: 98.7 F | BODY MASS INDEX: 43.47 KG/M2 | RESPIRATION RATE: 16 BRPM

## 2017-10-27 DIAGNOSIS — E11.21 TYPE 2 DIABETES MELLITUS WITH DIABETIC NEPHROPATHY, WITHOUT LONG-TERM CURRENT USE OF INSULIN (H): Primary | ICD-10-CM

## 2017-10-27 DIAGNOSIS — Z23 NEED FOR PROPHYLACTIC VACCINATION AND INOCULATION AGAINST INFLUENZA: ICD-10-CM

## 2017-10-27 DIAGNOSIS — F32.1 MODERATE MAJOR DEPRESSION (H): ICD-10-CM

## 2017-10-27 DIAGNOSIS — E03.4 HYPOTHYROIDISM DUE TO ACQUIRED ATROPHY OF THYROID: ICD-10-CM

## 2017-10-27 PROCEDURE — 90686 IIV4 VACC NO PRSV 0.5 ML IM: CPT | Performed by: FAMILY MEDICINE

## 2017-10-27 PROCEDURE — 99214 OFFICE O/P EST MOD 30 MIN: CPT | Mod: 25 | Performed by: FAMILY MEDICINE

## 2017-10-27 PROCEDURE — 99207 C FOOT EXAM  NO CHARGE: CPT | Performed by: FAMILY MEDICINE

## 2017-10-27 PROCEDURE — G0008 ADMIN INFLUENZA VIRUS VAC: HCPCS | Performed by: FAMILY MEDICINE

## 2017-10-27 NOTE — MR AVS SNAPSHOT
After Visit Summary   10/27/2017    Bhavani White    MRN: [id number]           Patient Information     Date Of Birth          1955        Visit Information        Provider Department      10/27/2017 2:40 PM Shawnee Dueñas,  [facility] [place]        Today's Diagnoses     Type 2 diabetes mellitus with diabetic nephropathy, without long-term current use of insulin (H)    -  1    Hypothyroidism due to acquired atrophy of thyroid        Moderate major depression (H)           Follow-ups after your visit        Additional Services     MENTAL HEALTH REFERRAL       Your provider has referred you to: [facility]: [facility] [phone]   [email or web address]/   *Referral from [facility] Primary Care Provider required - Consultation Model - medication management & future refills will be returned to [facility] PCP upon completion of evaluation  *Please call to schedule an appointment.    All scheduling is subject to the client's specific insurance plan & benefits, provider/location availability, and provider clinical specialities.  Please arrive 15 minutes early for your first appointment and bring your completed paperwork.    Please be aware that coverage of these services is subject to the terms and limitations of your health insurance plan.  Call member services at your health plan with any benefit or coverage questions.                  Your next 10 appointments already scheduled     Oct 31, 2017 10:30 AM CDT   LAB with CR LAB   [facility] ([facility])    [address] [place] [id number] [phone]           Patient must bring picture ID. Patient should be prepared to give a urine specimen  Please do not eat 10-12 hours before your appointment if you are coming in fasting for labs on lipids, cholesterol, or glucose (sugar). Pregnant women should  follow their Care Team instructions. Water with medications is okay. Do not drink coffee or other fluids. If you have concerns about taking  your medications, please ask at office or if scheduling via Xumii, send a message by clicking on Secure Messaging, Message Your Care Team.              Future tests that were ordered for you today     Open Future Orders        Priority Expected Expires Ordered    **A1C FUTURE anytime Routine 10/27/2017 10/27/2018 10/27/2017    TSH Routine  10/27/2018 10/27/2017    T4 free Routine  10/27/2018 10/27/2017    Albumin Random Urine Quantitative with Creat Ratio Routine 10/27/2017 10/27/2018 10/27/2017            Who to contact     If you have questions or need follow up information about today's clinic visit or your schedule please contact [facility] directly at [phone].  Normal or non-critical lab and imaging results will be communicated to you by [employer]art, letter or phone within 4 business days after the clinic has received the results. If you do not hear from us within 7 days, please contact the clinic through Sosht or phone. If you have a critical or abnormal lab result, we will notify you by phone as soon as possible.  Submit refill requests through Xumii or call your pharmacy and they will forward the refill request to us. Please allow 3 business days for your refill to be completed.          Additional Information About Your Visit        Xumii Information     Xumii gives you secure access to your electronic health record. If you see a primary care provider, you can also send messages to your care team and make appointments. If you have questions, please call your primary care clinic.  If you do not have a primary care provider, please call [phone] and they will assist you.        Care EveryWhere ID     This is your Care EveryWhere ID. This could be used by other organizations to access your [facility] medical records  [id number]       "  Your Vitals Were     Pulse Temperature Respirations Height Pulse Oximetry BMI (Body Mass Index)    91 98.7  F (37.1  C) (Oral) 16 5' 7\" (1.702 m) 96% 43.38 kg/m2       Blood Pressure from Last 3 Encounters:   10/27/17 121/77   08/23/17 133/89   08/09/17 130/80    Weight from Last 3 Encounters:   10/27/17 277 lb (125.6 kg)   08/23/17 283 lb 11.7 oz (128.7 kg)   07/31/17 298 lb (135.2 kg)              We Performed the Following     Asthma Action Plan (AAP)     FOOT EXAM     MENTAL HEALTH REFERRAL        Primary Care Provider Office Phone # Fax #    Shawnee Dueñas,  [phone] [address] [facility] [address]        Equal Access to Services     SANGEETA GOMEZ : Hadii aad ku hadasho Sosekou, waaxda luqadaha, qaybta kaalmada aderebeccayabryan, rowan gaviria . So [facility] [phone].    ATENCIÓN: Si habla español, tiene a shay disposición servicios gratuitos de asistencia lingüística. Zoraida al [phone].    We comply with applicable federal civil rights laws and [place] laws. We do not discriminate on the basis of race, color, national origin, age, disability, sex, sexual orientation, or gender identity.            Thank you!     Thank you for choosing [facility]  for your care. Our goal is always to provide you with excellent care. Hearing back from our patients is one way we can continue to improve our services. Please take a few minutes to complete the written survey that you may receive in the mail after your visit with us. Thank you!             Your Updated Medication List - Protect others around you: Learn how to safely use, store and throw away your medicines at www.disposemymeds.org.          This list is accurate as of: 10/27/17  4:13 PM.  Always use your most recent med list.                   Brand Name Dispense Instructions for use Diagnosis    * albuterol 108 (90 BASE) MCG/ACT Inhaler    PROAIR HFA/PROVENTIL HFA/VENTOLIN HFA    1 Inhaler "    Inhale 2 puffs into the lungs every 6 hours as needed for shortness of breath / dyspnea or wheezing    Moderate persistent asthma with acute exacerbation       * albuterol (2.5 MG/3ML) 0.083% neb solution     30 vial    Take 1 vial (2.5 mg) by nebulization every 4 hours as needed for shortness of breath / dyspnea or wheezing    Moderate persistent asthma with acute exacerbation       aspirin 81 MG tablet     30 tablet    Take by mouth daily    Type 2 diabetes, HbA1c goal < 7% (H)       blood glucose monitoring lancets     1 Box    Use to test blood sugar 1 times daily or as directed.    Type 2 diabetes mellitus with diabetic nephropathy (H)       blood glucose monitoring meter device kit     1 kit    Use to test blood sugar 1 times daily or as directed.    Type 2 diabetes mellitus with diabetic nephropathy (H)       buPROPion 300 MG 24 hr tablet    WELLBUTRIN XL    90 tablet    Take 1 tablet (300 mg) by mouth every morning    Moderate major depression (H)       ciprofloxacin 500 MG tablet    CIPRO    10 tablet    Take 1 tablet (500 mg) by mouth 2 times daily    Urinary tract infection       CYMBALTA PO      Take 120 mg by mouth daily        diazepam 5 MG tablet    VALIUM    30 tablet    Take 1 tablet (5 mg) by mouth every 8 hours as needed for anxiety or other (pain, muscle spasm)    Acute respiratory failure with hypoxia and hypercapnia (H)       gabapentin 600 MG tablet    NEURONTIN     1,200 mg 3 times daily        levothyroxine 100 MCG tablet    SYNTHROID/LEVOTHROID    90 tablet    Take 1 tablet (100 mcg) by mouth daily    Hypothyroidism due to acquired atrophy of thyroid       lisinopril 2.5 MG tablet    PRINIVIL/Zestril    90 tablet    Take 1 tablet (2.5 mg) by mouth daily    Hypertension goal BP (blood pressure) < 140/90       metFORMIN 500 MG 24 hr tablet    GLUCOPHAGE-XR    360 tablet    Take 1 tablet (500 mg) by mouth 2 times daily (with meals)    Type 2 diabetes mellitus with diabetic nephropathy (H)        methadone 10 MG tablet    DOLOPHINE    150 tablet    Take 2 pills TID    Acute respiratory failure with hypoxia and hypercapnia (H)       MIRALAX PO      1 capful daily prn        MULTIVITAMIN PO      1 a day        nystatin [id number] UNIT/GM Powd    MYCOSTATIN    30 g    Apply topically 3 times daily as needed    Candidiasis of skin       ondansetron 4 MG ODT tab    ZOFRAN ODT    20 tablet    Take 1-2 tablets (4-8 mg) by mouth every 8 hours as needed for nausea    Nausea and vomiting, intractability of vomiting not specified, unspecified vomiting type       * order for DME     1 each    Equipment being ordered: Hospital Bed, total electric (head, foot, and height adjustments), with any type side rails, with mattress    Wheelchair bound, Ankylosing spondylitis (H), Spinal stenosis in cervical region, Lumbar spinal stenosis, Fibromyalgia, Chronic low back pain       * order for DME     1 each    Equipment being ordered: Motorized Wheelchair    Wheelchair bound, Ankylosing spondylitis (H), Spinal stenosis in cervical region, Lumbar spinal stenosis, Fibromyalgia, Chronic low back pain       * order for DME     1 each    Equipment being ordered: Trapeze bar for hospital bed    Wheelchair bound, Ankylosing spondylitis (H), Spinal stenosis in cervical region, Lumbar spinal stenosis, Fibromyalgia, Chronic low back pain       * order for DME     1 Package    Equipment being ordered: Nebulizer    Moderate persistent asthma with acute exacerbation       * order for DME     1 each    Equipment being ordered: BIPAP.  15/5, Rate of 12, 2L O2 to keep sats 90-95%.    Acute respiratory failure with hypoxia and hypercapnia (H)       oxyCODONE 10 MG IR tablet    ROXICODONE     Take 10 mg by mouth        PYRIDIUM PO      Take 2 tablets by mouth 3 times daily as needed Dose unknown        senna-docusate 8.6-50 MG per tablet    SENOKOT-S;PERICOLACE    100 tablet    Take 2 tablets by mouth 2 times daily    Acute respiratory failure  with hypoxia and hypercapnia (H)       simvastatin 20 MG tablet    ZOCOR    90 tablet    Take 1 tablet (20 mg) by mouth At Bedtime    Hyperlipidemia LDL goal <100       tiZANidine 4 MG tablet    ZANAFLEX    90 tablet    Take 1 tablet (4 mg) by mouth 3 times daily        traZODone 100 MG tablet    DESYREL    90 tablet    Take 1 tablet (100 mg) by mouth At Bedtime    Insomnia       * Notice:  This list has 7 medication(s) that are the same as other medications prescribed for you. Read the directions carefully, and ask your doctor or other care provider to review them with you.

## 2017-10-27 NOTE — PROGRESS NOTES

## 2017-10-27 NOTE — NURSING NOTE
"Chief Complaint   Patient presents with     Diabetes     Blood Draw       Initial There were no vitals taken for this visit. Estimated body mass index is 44.43 kg/(m^2) as calculated from the following:    Height as of 8/23/17: 5' 7.01\" (1.702 m).    Weight as of 8/23/17: 283 lb 11.7 oz (128.7 kg).  Medication Reconciliation: complete   Ame Ayala, LORENZA      "

## 2017-10-27 NOTE — PROGRESS NOTES
SUBJECTIVE:                                                    Bhavani White is a 61 year old female who presents to clinic today for the following health issues:      History of Present Illness     Diabetes:     Frequency of checking blood sugars::  Rarely    Diabetic concerns::  None    Hypoglycemia symptoms::  Shaky, Dizzy, Weak, Lethargy, Blurred vision and Confusion    Paraesthesia present::  YES    Eye Exam in the last year::  Yes    2017      Depression Followup    Status since last visit: Worsened, anniversary of her son's death    See PHQ-9 for current symptoms.  Other associated symptoms: Decreased appetite, losing weight     Complicating factors:   Significant life event:  Yes-  Death of her son    Current substance abuse:  None  Anxiety or Panic symptoms:  No    PHQ-9 Score and MyChart F/U Questions 10/26/2016 6/1/2017 8/10/2017   Total Score 5 15 11   Q9: Suicide Ideation Not at all Not at all Not at all     In the past two weeks have you had thoughts of suicide or self-harm?  No.    Do you have concerns about your personal safety or the safety of others?   No    PHQ-9  English  PHQ-9   Any Language  Suicide Assessment Five-step Evaluation and Treatment (SAFE-T)        Problem list and histories reviewed & adjusted, as indicated.  Additional history: as documented    Patient Active Problem List   Diagnosis     Chronic low back pain     Moderate major depression (H)     Anxiety     Type 2 diabetes mellitus with diabetic nephropathy (H)     Hypothyroidism due to acquired atrophy of thyroid     Suprapubic catheter (H)     Lumbar spinal stenosis     Fibromyalgia     Osteoarthritis     Hypertension goal BP (blood pressure) < 140/90     Health Care Home     Neuropathy     Hyperlipidemia LDL goal <100     ACP (advance care planning)     Recurrent UTI (urinary tract infection)     Controlled substance agreement signed 10/24/2014     Chronic narcotic dependence (H)     History of ulcer disease     Wheelchair  bound     Asthma     Personal history of methicillin resistant Staphylococcus aureus     Fracture of lower extremity     Neurogenic bladder     Ventral hernia     Osteoarthritis of cervical spine with myelopathy     DISH (diffuse idiopathic skeletal hyperostosis)     Morbid obesity, unspecified obesity type (H)     Methadone use (H)     Past Surgical History:   Procedure Laterality Date     ABDOMEN SURGERY       C LAMINECTOMY,FACETECTOMY,LUMBAR  1982     C TOTAL KNEE ARTHROPLASTY  1999    left     CYSTOSCOPY, INTRAVESICAL INJECTION N/A 8/23/2017    Procedure: CYSTOSCOPY, INTRAVESICAL INJECTION;  Cystoscopy, Botox Injection Into The Bladder  Latex Allergy ;  Surgeon: Yoandy Rios MD;  Location: [facility] OR     DISCECTOMY, FUSION CERVICAL ANTERIOR THREE+ LEVELS, COMBINED Left 5/3/2016    Procedure: COMBINED DISCECTOMY, FUSION CERVICAL ANTERIOR THREE+ LEVELS;  Surgeon: Jasvir Torres MD;  Location: [facility] OR     GENITOURINARY SURGERY      suprapubic catheter     HERNIA REPAIR  1957    double hernia     HYSTERECTOMY, PAP NO LONGER INDICATED      CELIA and large ovarian tumor removed     SURGICAL HISTORY OF -       left cataract surgery     SURGICAL HISTORY OF -   12/14    right cataract surgery and left laser revision     SURGICAL HISTORY OF -   March 2015    left carpal tunnel release     TONSILLECTOMY  1974       Social History   Substance Use Topics     Smoking status: Never Smoker     Smokeless tobacco: Never Used     Alcohol use No     Family History   Problem Relation Age of Onset     Depression Son      Hypertension Mother      HEART DISEASE Mother      bypass     Depression Mother      Hypertension Father      Connective Tissue Disorder Father      ankylosing spondylitis     CANCER Father      colon; prostate     Other Cancer Father      bladder cancer     Depression Sister              ROS:  Constitutional, HEENT, cardiovascular, pulmonary, gi and gu systems are negative, except as otherwise  "noted.      OBJECTIVE:   /77 (BP Location: Left arm, Patient Position: Chair, Cuff Size: Adult Large)  Pulse 91  Temp 98.7  F (37.1  C) (Oral)  Resp 16  Ht 5' 7\" (1.702 m)  Wt 277 lb (125.6 kg)  SpO2 96%  BMI 43.38 kg/m2  Body mass index is 43.38 kg/(m^2).  GENERAL: healthy, alert and no distress  RESP: lungs clear to auscultation - no rales, rhonchi or wheezes  CV: regular rate and rhythm, normal S1 S2, no S3 or S4, no murmur, click or rub, no peripheral edema and peripheral pulses strong  Diabetic foot exam: Patient is unable to feel monofilament at all points.  Decreased pedal pulses.  +Onychomycosis.  Long toenails that are starting to curl into the skin.  Significant hammertoes and bunions.      ASSESSMENT/PLAN:     1. Type 2 diabetes mellitus with diabetic nephropathy, without long-term current use of insulin (H)  - DM2 with nephropathy and neuropathy  - Advised Twinkle Toes for toenail care before her toenails cause an ulceration  - Advised looking at feet daily to check for sores   - **A1C FUTURE anytime; Future  - TSH; Future  - T4 free; Future  - FOOT EXAM  - Albumin Random Urine Quantitative with Creat Ratio; Future    2. Hypothyroidism due to acquired atrophy of thyroid  - Will get labs next week (fasting)    3. Moderate major depression (H)  - Refer to psychiatry for med management  - Chronic depression which is worsening   - MENTAL HEALTH REFERRAL    F/U for lab visit next week, then in 1 month for recheck     Shawnee Dueñas,   [facility]"

## 2017-10-30 ASSESSMENT — ANXIETY QUESTIONNAIRES
3. WORRYING TOO MUCH ABOUT DIFFERENT THINGS: NEARLY EVERY DAY
5. BEING SO RESTLESS THAT IT IS HARD TO SIT STILL: NOT AT ALL
GAD7 TOTAL SCORE: 13
6. BECOMING EASILY ANNOYED OR IRRITABLE: NEARLY EVERY DAY
IF YOU CHECKED OFF ANY PROBLEMS ON THIS QUESTIONNAIRE, HOW DIFFICULT HAVE THESE PROBLEMS MADE IT FOR YOU TO DO YOUR WORK, TAKE CARE OF THINGS AT HOME, OR GET ALONG WITH OTHER PEOPLE: VERY DIFFICULT
7. FEELING AFRAID AS IF SOMETHING AWFUL MIGHT HAPPEN: NOT AT ALL
2. NOT BEING ABLE TO STOP OR CONTROL WORRYING: NEARLY EVERY DAY
1. FEELING NERVOUS, ANXIOUS, OR ON EDGE: NEARLY EVERY DAY

## 2017-10-30 ASSESSMENT — PATIENT HEALTH QUESTIONNAIRE - PHQ9
SUM OF ALL RESPONSES TO PHQ QUESTIONS 1-9: 9
5. POOR APPETITE OR OVEREATING: SEVERAL DAYS

## 2017-10-31 ASSESSMENT — ANXIETY QUESTIONNAIRES: GAD7 TOTAL SCORE: 13

## 2017-11-01 ENCOUNTER — DOCUMENTATION ONLY (OUTPATIENT)
Dept: FAMILY MEDICINE | Facility: CLINIC | Age: 62
End: 2017-11-01

## 2017-11-01 DIAGNOSIS — E11.21 TYPE 2 DIABETES MELLITUS WITH DIABETIC NEPHROPATHY, WITHOUT LONG-TERM CURRENT USE OF INSULIN (H): ICD-10-CM
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
## 2017-11-01 LAB — HBA1C MFR BLD: 6.7 % (ref 4.3–6)

## 2017-11-01 PROCEDURE — 83036 HEMOGLOBIN GLYCOSYLATED A1C: CPT | Performed by: FAMILY MEDICINE

## 2017-11-01 PROCEDURE — 36415 COLL VENOUS BLD VENIPUNCTURE: CPT | Performed by: FAMILY MEDICINE

## 2017-11-01 PROCEDURE — 82043 UR ALBUMIN QUANTITATIVE: CPT | Performed by: FAMILY MEDICINE

## 2017-11-01 PROCEDURE — 80061 LIPID PANEL: CPT | Performed by: FAMILY MEDICINE

## 2017-11-01 PROCEDURE — 84439 ASSAY OF FREE THYROXINE: CPT | Performed by: FAMILY MEDICINE

## 2017-11-01 PROCEDURE — 84443 ASSAY THYROID STIM HORMONE: CPT | Performed by: FAMILY MEDICINE

## 2017-11-01 NOTE — PROGRESS NOTES
Patient was in today for her 's visit and mentioned that her cat bit her a couple days ago.  I took a look at the small bite on her left forearm and noted it had some slight erythema surrounding.  No induration or purulence.  Will send in some Augmentin to cover for infection.

## 2017-11-02 ENCOUNTER — TELEPHONE (OUTPATIENT)
Dept: FAMILY MEDICINE | Facility: CLINIC | Age: 62
End: 2017-11-02

## 2017-11-02 NOTE — TELEPHONE ENCOUNTER
Yisel with [facility] home care calls, pt moved to [facility], verbal order provided for one OT visit, FYI to RIKA Mccarthy, RN, BSN  Message handled by Nurse Triage.

## 2017-11-03 LAB
CHOLEST SERPL-MCNC: 159 MG/DL
CREAT UR-MCNC: 103 MG/DL
HDLC SERPL-MCNC: 54 MG/DL
LDLC SERPL CALC-MCNC: 59 MG/DL
MICROALBUMIN UR-MCNC: 36 MG/L
MICROALBUMIN/CREAT UR: 35.15 MG/G CR (ref 0–25)
NONHDLC SERPL-MCNC: 105 MG/DL
T4 FREE SERPL-MCNC: 1.47 NG/DL (ref 0.76–1.46)
TRIGL SERPL-MCNC: 229 MG/DL
TSH SERPL DL<=0.005 MIU/L-ACNC: 0.52 MU/L (ref 0.4–4)

## 2017-11-18 ENCOUNTER — MYC MEDICAL ADVICE (OUTPATIENT)
Dept: FAMILY MEDICINE | Facility: CLINIC | Age: 62
End: 2017-11-18

## 2017-11-20 NOTE — TELEPHONE ENCOUNTER
MP, see VEEDIMS message and advise thyroid and update order for bi-pap?? Inform pt when final via [employer]    1)  2) Your thyroid labs look good.  I would recommend staying on the same dose of Synthroid   TSH   Date Value Ref Range Status   11/01/2017 0.52 0.40 - 4.00 mU/L Final   ]    2)Would you also update the order for a bi-pap.     Ekta Mccarthy RN, BSN  Message handled by Nurse Triage.

## 2017-11-24 DIAGNOSIS — G47.00 INSOMNIA: ICD-10-CM

## 2017-11-28 RX ORDER — TRAZODONE HYDROCHLORIDE 100 MG/1
TABLET ORAL
Qty: 90 TABLET | Refills: 1 | Status: SHIPPED | OUTPATIENT
Start: 2017-11-28 | End: 2019-03-11

## 2017-11-28 NOTE — TELEPHONE ENCOUNTER
Prescription approved per [facility] [place].  Ekta Mccarthy RN, BSN

## 2017-12-01 ENCOUNTER — MYC REFILL (OUTPATIENT)
Dept: FAMILY MEDICINE | Facility: CLINIC | Age: 62
End: 2017-12-01

## 2017-12-01 DIAGNOSIS — E78.5 HYPERLIPIDEMIA LDL GOAL <100: ICD-10-CM

## 2017-12-02 NOTE — TELEPHONE ENCOUNTER
Message from [employer]:  Original authorizing provider: DO Bhavani Carroll would like a refill of the following medications:  simvastatin (ZOCOR) 20 MG tablet [Shawnee Dueñas DO]    Preferred pharmacy: [facility] [address] [place] [address] [id number] AT [place] [address]    Comment:  Please refill at [facility] on [facility] in [place], [place]

## 2017-12-04 RX ORDER — SIMVASTATIN 20 MG
20 TABLET ORAL AT BEDTIME
Qty: 90 TABLET | Refills: 3 | Status: SHIPPED | OUTPATIENT
Start: 2017-12-04 | End: 2018-12-08

## 2017-12-04 NOTE — TELEPHONE ENCOUNTER
See below  Recent Labs   Lab Test  11/01/17   1535  11/18/16   1132   12/12/14   1345  06/26/13   CHOL  159  147   < >  133   --   156   HDL  54  48*   < >  58   --   43   LDL  59  64   < >  32   < >  75   TRIG  229*  174*   < >  216*   --   192*   CHOLHDLRATIO   --    --    --   2.3   --   3.63    < > = values in this interval not displayed.     Lab Results   Component Value Date    AST 22 07/26/2017     Lab Results   Component Value Date    ALT 29 07/26/2017     Prescription approved per [facility] [place].  Ekta Mccarthy, RN, BSN

## 2017-12-10 ENCOUNTER — APPOINTMENT (OUTPATIENT)
Dept: CT IMAGING | Facility: CLINIC | Age: 62
End: 2017-12-10
Attending: EMERGENCY MEDICINE
Payer: MEDICARE

## 2017-12-10 ENCOUNTER — HOSPITAL ENCOUNTER (EMERGENCY)
Facility: CLINIC | Age: 62
Discharge: HOME | End: 2017-12-10
Attending: EMERGENCY MEDICINE | Admitting: EMERGENCY MEDICINE
Payer: MEDICARE

## 2017-12-10 ENCOUNTER — APPOINTMENT (OUTPATIENT)
Dept: GENERAL RADIOLOGY | Facility: CLINIC | Age: 62
End: 2017-12-10
Attending: EMERGENCY MEDICINE
Payer: MEDICARE

## 2017-12-10 VITALS
HEART RATE: 65 BPM | DIASTOLIC BLOOD PRESSURE: 75 MMHG | SYSTOLIC BLOOD PRESSURE: 130 MMHG | OXYGEN SATURATION: 96 % | TEMPERATURE: 98.8 F | RESPIRATION RATE: 18 BRPM

## 2017-12-10 DIAGNOSIS — N39.0 URINARY TRACT INFECTION WITHOUT HEMATURIA, SITE UNSPECIFIED: ICD-10-CM

## 2017-12-10 DIAGNOSIS — R10.84 ABDOMINAL PAIN, GENERALIZED: ICD-10-CM

## 2017-12-10 DIAGNOSIS — N28.1 RENAL CYST: ICD-10-CM

## 2017-12-10 LAB
ALBUMIN SERPL-MCNC: 3.4 G/DL (ref 3.4–5)
ALBUMIN UR-MCNC: 30 MG/DL
ALP SERPL-CCNC: 140 U/L (ref 40–150)
ALT SERPL W P-5'-P-CCNC: 32 U/L (ref 0–50)
AMORPH CRY #/AREA URNS HPF: ABNORMAL /HPF
ANION GAP SERPL CALCULATED.3IONS-SCNC: 5 MMOL/L (ref 3–14)
APPEARANCE UR: ABNORMAL
AST SERPL W P-5'-P-CCNC: 19 U/L (ref 0–45)
BACTERIA #/AREA URNS HPF: ABNORMAL /HPF
BACTERIA SPEC CULT: NORMAL
BASOPHILS # BLD AUTO: 0 10E9/L (ref 0–0.2)
BASOPHILS NFR BLD AUTO: 0.3 %
BILIRUB SERPL-MCNC: 0.7 MG/DL (ref 0.2–1.3)
BILIRUB UR QL STRIP: NEGATIVE
BUN SERPL-MCNC: 19 MG/DL (ref 7–30)
CALCIUM SERPL-MCNC: 10.3 MG/DL (ref 8.5–10.1)
CHLORIDE SERPL-SCNC: 96 MMOL/L (ref 94–109)
CO2 SERPL-SCNC: 32 MMOL/L (ref 20–32)
COLOR UR AUTO: ABNORMAL
CREAT SERPL-MCNC: 0.85 MG/DL (ref 0.52–1.04)
DIFFERENTIAL METHOD BLD: ABNORMAL
EOSINOPHIL # BLD AUTO: 0.1 10E9/L (ref 0–0.7)
EOSINOPHIL NFR BLD AUTO: 0.5 %
ERYTHROCYTE [DISTWIDTH] IN BLOOD BY AUTOMATED COUNT: 13.6 % (ref 10–15)
GFR SERPL CREATININE-BSD FRML MDRD: 68 ML/MIN/1.7M2
GLUCOSE SERPL-MCNC: 217 MG/DL (ref 70–99)
GLUCOSE UR STRIP-MCNC: 50 MG/DL
HCT VFR BLD AUTO: 51.1 % (ref 35–47)
HGB BLD-MCNC: 16.6 G/DL (ref 11.7–15.7)
HGB UR QL STRIP: NEGATIVE
IMM GRANULOCYTES # BLD: 0 10E9/L (ref 0–0.4)
IMM GRANULOCYTES NFR BLD: 0.2 %
KETONES UR STRIP-MCNC: NEGATIVE MG/DL
LACTATE BLD-SCNC: 1.7 MMOL/L (ref 0.7–2)
LEUKOCYTE ESTERASE UR QL STRIP: ABNORMAL
LIPASE SERPL-CCNC: 98 U/L (ref 73–393)
LYMPHOCYTES # BLD AUTO: 1.3 10E9/L (ref 0.8–5.3)
LYMPHOCYTES NFR BLD AUTO: 10.1 %
MCH RBC QN AUTO: 26.9 PG (ref 26.5–33)
MCHC RBC AUTO-ENTMCNC: 32.5 G/DL (ref 31.5–36.5)
MCV RBC AUTO: 83 FL (ref 78–100)
MONOCYTES # BLD AUTO: 0.3 10E9/L (ref 0–1.3)
MONOCYTES NFR BLD AUTO: 2.3 %
NEUTROPHILS # BLD AUTO: 11.4 10E9/L (ref 1.6–8.3)
NEUTROPHILS NFR BLD AUTO: 86.6 %
NITRATE UR QL: POSITIVE
NRBC # BLD AUTO: 0 10*3/UL
NRBC BLD AUTO-RTO: 0 /100
PH UR STRIP: 7 PH (ref 5–7)
PLATELET # BLD AUTO: 343 10E9/L (ref 150–450)
POTASSIUM SERPL-SCNC: 4.2 MMOL/L (ref 3.4–5.3)
PROT SERPL-MCNC: 8.3 G/DL (ref 6.8–8.8)
RBC # BLD AUTO: 6.17 10E12/L (ref 3.8–5.2)
RBC #/AREA URNS AUTO: 2 /HPF (ref 0–2)
SODIUM SERPL-SCNC: 133 MMOL/L (ref 133–144)
SOURCE: ABNORMAL
SP GR UR STRIP: 1.02 (ref 1–1.03)
SPECIMEN SOURCE: NORMAL
SQUAMOUS #/AREA URNS AUTO: <1 /HPF (ref 0–1)
TROPONIN I SERPL-MCNC: <0.015 UG/L (ref 0–0.04)
UROBILINOGEN UR STRIP-MCNC: 2 MG/DL (ref 0–2)
WBC # BLD AUTO: 13.1 10E9/L (ref 4–11)
WBC #/AREA URNS AUTO: 45 /HPF (ref 0–2)

## 2017-12-10 PROCEDURE — 80053 COMPREHEN METABOLIC PANEL: CPT | Performed by: EMERGENCY MEDICINE

## 2017-12-10 PROCEDURE — 25000128 H RX IP 250 OP 636: Performed by: EMERGENCY MEDICINE

## 2017-12-10 PROCEDURE — 81001 URINALYSIS AUTO W/SCOPE: CPT | Performed by: EMERGENCY MEDICINE

## 2017-12-10 PROCEDURE — 96376 TX/PRO/DX INJ SAME DRUG ADON: CPT

## 2017-12-10 PROCEDURE — 87186 SC STD MICRODIL/AGAR DIL: CPT | Performed by: EMERGENCY MEDICINE

## 2017-12-10 PROCEDURE — S0028 INJECTION, FAMOTIDINE, 20 MG: HCPCS | Performed by: EMERGENCY MEDICINE

## 2017-12-10 PROCEDURE — 25000125 ZZHC RX 250: Performed by: EMERGENCY MEDICINE

## 2017-12-10 PROCEDURE — 85025 COMPLETE CBC W/AUTO DIFF WBC: CPT | Performed by: EMERGENCY MEDICINE

## 2017-12-10 PROCEDURE — 96374 THER/PROPH/DIAG INJ IV PUSH: CPT

## 2017-12-10 PROCEDURE — 71020 XR CHEST 2 VW: CPT

## 2017-12-10 PROCEDURE — 87086 URINE CULTURE/COLONY COUNT: CPT | Performed by: EMERGENCY MEDICINE

## 2017-12-10 PROCEDURE — 83690 ASSAY OF LIPASE: CPT | Performed by: EMERGENCY MEDICINE

## 2017-12-10 PROCEDURE — 96375 TX/PRO/DX INJ NEW DRUG ADDON: CPT

## 2017-12-10 PROCEDURE — 99285 EMERGENCY DEPT VISIT HI MDM: CPT | Mod: 25

## 2017-12-10 PROCEDURE — 87088 URINE BACTERIA CULTURE: CPT | Performed by: EMERGENCY MEDICINE

## 2017-12-10 PROCEDURE — 84484 ASSAY OF TROPONIN QUANT: CPT | Performed by: EMERGENCY MEDICINE

## 2017-12-10 PROCEDURE — 93005 ELECTROCARDIOGRAM TRACING: CPT

## 2017-12-10 PROCEDURE — 74177 CT ABD & PELVIS W/CONTRAST: CPT

## 2017-12-10 PROCEDURE — 83605 ASSAY OF LACTIC ACID: CPT | Performed by: EMERGENCY MEDICINE

## 2017-12-10 RX ORDER — HYDROMORPHONE HYDROCHLORIDE 1 MG/ML
0.5 INJECTION, SOLUTION INTRAMUSCULAR; INTRAVENOUS; SUBCUTANEOUS ONCE
Status: COMPLETED | OUTPATIENT
Start: 2017-12-10 | End: 2017-12-10

## 2017-12-10 RX ORDER — SULFAMETHOXAZOLE/TRIMETHOPRIM 800-160 MG
1 TABLET ORAL 2 TIMES DAILY
Qty: 6 TABLET | Refills: 0 | Status: SHIPPED | OUTPATIENT
Start: 2017-12-10 | End: 2017-12-13

## 2017-12-10 RX ORDER — IOPAMIDOL 755 MG/ML
500 INJECTION, SOLUTION INTRAVASCULAR ONCE
Status: COMPLETED | OUTPATIENT
Start: 2017-12-10 | End: 2017-12-10

## 2017-12-10 RX ORDER — CEFTRIAXONE SODIUM 1 G/50ML
1 INJECTION, SOLUTION INTRAVENOUS ONCE
Status: DISCONTINUED | OUTPATIENT
Start: 2017-12-10 | End: 2017-12-10 | Stop reason: HOSPADM

## 2017-12-10 RX ORDER — HYDROMORPHONE HYDROCHLORIDE 1 MG/ML
0.5 INJECTION, SOLUTION INTRAMUSCULAR; INTRAVENOUS; SUBCUTANEOUS
Status: COMPLETED | OUTPATIENT
Start: 2017-12-10 | End: 2017-12-10

## 2017-12-10 RX ORDER — ONDANSETRON 2 MG/ML
4 INJECTION INTRAMUSCULAR; INTRAVENOUS
Status: COMPLETED | OUTPATIENT
Start: 2017-12-10 | End: 2017-12-10

## 2017-12-10 RX ADMIN — IOPAMIDOL 100 ML: 755 INJECTION, SOLUTION INTRAVENOUS at 17:37

## 2017-12-10 RX ADMIN — FAMOTIDINE 20 MG: 10 INJECTION, SOLUTION INTRAVENOUS at 16:46

## 2017-12-10 RX ADMIN — ONDANSETRON 4 MG: 2 INJECTION INTRAMUSCULAR; INTRAVENOUS at 16:45

## 2017-12-10 RX ADMIN — Medication 0.5 MG: at 17:27

## 2017-12-10 RX ADMIN — Medication 0.5 MG: at 16:45

## 2017-12-10 RX ADMIN — SODIUM CHLORIDE 65 ML: 9 INJECTION, SOLUTION INTRAVENOUS at 17:37

## 2017-12-10 ASSESSMENT — ENCOUNTER SYMPTOMS
NAUSEA: 1
VOMITING: 1
ABDOMINAL PAIN: 1
BLOOD IN STOOL: 0
FEVER: 0
DIAPHORESIS: 1
APPETITE CHANGE: 0

## 2017-12-10 NOTE — ED NOTES
Pt on methadone, has history of abdominal pain, worse today and unable to keep methadone down due to vomiting.

## 2017-12-10 NOTE — ED NOTES
Bed: ED10  Expected date: 12/10/17  Expected time: 3:56 PM  Means of arrival: Ambulance  Comments:  A594. 62F. Vomiting. ABD pain

## 2017-12-10 NOTE — ED AVS SNAPSHOT
[facility] Emergency Department    [address]    [facility] [address]    Phone:  [phone]    Fax:  [phone]                                       Bhavani White   MRN: [id number]    Department:  [facility] Emergency Department   Date of Visit:  12/10/2017           Patient Information     Date Of Birth          1955        Your diagnoses for this visit were:     Abdominal pain, generalized     Urinary tract infection without hematuria, site unspecified     Renal cyst        You were seen by Tong Melgar MD.      Follow-up Information     Follow up with Shawnee Dueñas DO. Schedule an appointment as soon as possible for a visit in 2 days.    Specialty:  Family Practice    Contact information:    [address]  [facility] [address] [phone]          Follow up with [facility] Emergency Department.    Specialty:  EMERGENCY MEDICINE    Why:  If symptoms worsen    Contact information:    [address] [facility] [address] [phone]        Discharge Instructions       Follow-up:  Please follow-up with your primary care provider in 1-2 days for re-evaluation and discussion of your visit to the emergency department today.    Home treatments:  Recommended home therapies include rest, fluids, begin zantac, and antibiotic, and close follow-up.    New prescriptions:  Bactrim  Zantac    Return precautions:  Warning signs which should prompt you to return to the ER include worsening pain, vomiting, fevers, chest pain, or any other new or troubling symptoms.  We are always happy to see you again.    Discharge Instructions  Urinary Tract Infection  You or your child have been diagnosed with a urinary tract infection, or UTI. The urinary tract includes the kidneys (which make urine/pee), ureters (the tubes that carry urine/pee from the kidneys to the bladder), the bladder (which stores urine/pee), and urethra (the tube that  carries urine/pee out of the bladder). Urinary tract infections occur when bacteria travel up the urethra into the bladder (bladder infection) and, in some cases, from there into the kidneys (kidney infection).  Generally, every Emergency Department visit should have a follow-up clinic visit with either a primary or a specialty clinic/provider. Please follow-up as instructed by your emergency provider today.  Return to the Emergency Department if:    You or your child have severe back pain.    You or your child are vomiting (throwing up) so that you cannot take your medicine.    You or your child have a new fever (had not previously had a fever) over 101 F.    You or your child have confusion or are very weak, or feel very ill.    Your child seems much more ill, will not wake up, will not respond right, or is crying for a long time and will not calm down.    You or your child are showing signs of dehydration. These signs may include decreased urination (pee), dry mouth/gums/tongue, or decreased activity.    Follow-up with your provider:     Children under 24 months need to be seen by their regular provider within one week after a diagnosis of a UTI. It may be necessary to do some more tests to look at the child s kidney or bladder.    You should begin to feel better within 24 - 48 hours of starting your antibiotic; follow-up with your regular clinic/doctor/provider if this is not the case.    Treatment:     You will be treated with an antibiotic to kill the bacteria. We have to make an educated guess, based on what we know about common bacteria and antibiotics, as to which antibiotic will work for your infection. We will be correct most times but there will be some cases where the antibiotic chosen is not correct (see urine cultures below).    Take a pain medication such as acetaminophen (Tylenol ) or ibuprofen (Advil , Motrin , Nuprin ).    Phenazopyridine (Pyridium , Uristat ) is a prescription medication that  "numbs the bladder to reduce the burning pain of some UTIs.  The same medication is available in a non-prescription version (Azo-Standard , Urodol ). This medication will change the color of the urine and tears (usually blue or orange). If you wear contacts, do not wear them while taking this medication as they may be stained by the medication.    Urine Cultures:    If indicated, a urine culture may have been performed today. This test generally takes 24-48 hours to complete so the results are not known at this time. The results can confirm that an infection is present but also determine which antibiotic is effective for the specific bacteria that is causing the infection. If your urine culture shows that the antibiotic you were given today will not work to treat your infection, we will attempt to contact you to make arrangements to change the antibiotic. If the culture confirms that the antibiotic is effective for your infection, you will not be contacted. We often recommend follow-up with your regular physician/provider on the culture results regardless of this process.    Antibiotic Warning:     If you have been placed on antibiotics - watch for signs of allergic reaction.  These include rash, lip swelling, difficulty breathing, wheezing, and dizziness.  If you develop any of these symptoms, stop the antibiotic immediately and go to an emergency room or urgent care for evaluation.    Probiotics: If you have been given an antibiotic, you may want to also take a probiotic pill or eat yogurt with live cultures. Probiotics have \"good bacteria\" to help your intestines stay healthy. Studies have shown that probiotics help prevent diarrhea and other intestine problems (including C. diff infection) when you take antibiotics. You can buy these without a prescription in the pharmacy section of the store.   If you were given a prescription for medicine here today, be sure to read all of the information (including the package " insert) that comes with your prescription.  This will include important information about the medicine, its side effects, and any warnings that you need to know about.  The pharmacist who fills the prescription can provide more information and answer questions you may have about the medicine.  If you have questions or concerns that the pharmacist cannot address, please call or return to the Emergency Department.   Remember that you can always come back to the Emergency Department if you are not able to see your regular provider in the amount of time listed above, if you get any new symptoms, or if there is anything that worries you.          Future Appointments        Provider Department Dept Phone Center    2/9/2018 3:15 PM Pina Montilla NP [facility] [phone] [place]      24 Hour Appointment Hotline       To make an appointment at any [facility], call [phone] ([phone]). If you don't have a family doctor or clinic, we will help you find one. [facility] are conveniently located to serve the needs of you and your family.             Review of your medicines      START taking        Dose / Directions Last dose taken    ranitidine 150 MG tablet   Commonly known as:  ZANTAC   Dose:  150 mg   Quantity:  20 tablet        Take 1 tablet (150 mg) by mouth 2 times daily for 10 days   Refills:  0        sulfamethoxazole-trimethoprim 800-160 MG per tablet   Commonly known as:  BACTRIM DS/SEPTRA DS   Dose:  1 tablet   Quantity:  6 tablet        Take 1 tablet by mouth 2 times daily for 3 days   Refills:  0          Our records show that you are taking the medicines listed below. If these are incorrect, please call your family doctor or clinic.        Dose / Directions Last dose taken    * albuterol 108 (90 BASE) MCG/ACT Inhaler   Commonly known as:  PROAIR HFA/PROVENTIL HFA/VENTOLIN HFA   Dose:  2 puff   Quantity:  1 Inhaler        Inhale 2 puffs into the lungs every 6 hours as needed  for shortness of breath / dyspnea or wheezing   Refills:  0        * albuterol (2.5 MG/3ML) 0.083% neb solution   Dose:  1 vial   Quantity:  30 vial        Take 1 vial (2.5 mg) by nebulization every 4 hours as needed for shortness of breath / dyspnea or wheezing   Refills:  1        amoxicillin-clavulanate 875-125 MG per tablet   Commonly known as:  AUGMENTIN   Dose:  1 tablet   Quantity:  20 tablet        Take 1 tablet by mouth 2 times daily   Refills:  0        aspirin 81 MG tablet   Quantity:  30 tablet        Take by mouth daily   Refills:  0        blood glucose monitoring lancets   Quantity:  1 Box        Use to test blood sugar 1 times daily or as directed.   Refills:  prn        blood glucose monitoring meter device kit   Quantity:  1 kit        Use to test blood sugar 1 times daily or as directed.   Refills:  0        buPROPion 300 MG 24 hr tablet   Commonly known as:  WELLBUTRIN XL   Dose:  300 mg   Quantity:  90 tablet        Take 1 tablet (300 mg) by mouth every morning   Refills:  1        ciprofloxacin 500 MG tablet   Commonly known as:  CIPRO   Dose:  500 mg   Quantity:  10 tablet        Take 1 tablet (500 mg) by mouth 2 times daily   Refills:  0        CYMBALTA PO   Dose:  120 mg        Take 120 mg by mouth daily   Refills:  0        diazepam 5 MG tablet   Commonly known as:  VALIUM   Dose:  5 mg   Quantity:  30 tablet        Take 1 tablet (5 mg) by mouth every 8 hours as needed for anxiety or other (pain, muscle spasm)   Refills:  0        gabapentin 600 MG tablet   Commonly known as:  NEURONTIN   Dose:  1200 mg        1,200 mg 3 times daily   Refills:  2        levothyroxine 100 MCG tablet   Commonly known as:  SYNTHROID/LEVOTHROID   Dose:  100 mcg   Quantity:  90 tablet        Take 1 tablet (100 mcg) by mouth daily   Refills:  0        lisinopril 2.5 MG tablet   Commonly known as:  PRINIVIL/Zestril   Dose:  2.5 mg   Quantity:  90 tablet        Take 1 tablet (2.5 mg) by mouth daily   Refills:  1         metFORMIN 500 MG 24 hr tablet   Commonly known as:  GLUCOPHAGE-XR   Dose:  500 mg   Quantity:  360 tablet        Take 1 tablet (500 mg) by mouth 2 times daily (with meals)   Refills:  0        methadone 10 MG tablet   Commonly known as:  DOLOPHINE   Quantity:  150 tablet        Take 2 pills TID   Refills:  0        MIRALAX PO        1 capful daily prn   Refills:  0        MULTIVITAMIN PO        1 a day   Refills:  0        nystatin 949615 UNIT/GM Powd   Commonly known as:  MYCOSTATIN   Quantity:  30 g        Apply topically 3 times daily as needed   Refills:  1        ondansetron 4 MG ODT tab   Commonly known as:  ZOFRAN ODT   Dose:  4-8 mg   Quantity:  20 tablet        Take 1-2 tablets (4-8 mg) by mouth every 8 hours as needed for nausea   Refills:  1        * order for DME   Quantity:  1 each        Equipment being ordered: Hospital Bed, total electric (head, foot, and height adjustments), with any type side rails, with mattress   Refills:  0        * order for DME   Quantity:  1 each        Equipment being ordered: Motorized Wheelchair   Refills:  0        * order for DME   Quantity:  1 each        Equipment being ordered: Trapeze bar for hospital bed   Refills:  0        * order for DME   Quantity:  1 Package        Equipment being ordered: Nebulizer   Refills:  0        * order for DME   Quantity:  1 each        Equipment being ordered: BIPAP.  15/5, Rate of 12, 2L O2 to keep sats 90-95%.   Refills:  0        oxyCODONE IR 10 MG tablet   Commonly known as:  ROXICODONE   Dose:  10 mg        Take 10 mg by mouth   Refills:  0        PYRIDIUM PO   Dose:  2 tablet        Take 2 tablets by mouth 3 times daily as needed Dose unknown   Refills:  0        senna-docusate 8.6-50 MG per tablet   Commonly known as:  SENOKOT-S;PERICOLACE   Dose:  2 tablet   Quantity:  100 tablet        Take 2 tablets by mouth 2 times daily   Refills:  0        simvastatin 20 MG tablet   Commonly known as:  ZOCOR   Dose:  20 mg   Quantity:   90 tablet        Take 1 tablet (20 mg) by mouth At Bedtime   Refills:  3        tiZANidine 4 MG tablet   Commonly known as:  ZANAFLEX   Dose:  4 mg   Quantity:  90 tablet        Take 1 tablet (4 mg) by mouth 3 times daily   Refills:  0        traZODone 100 MG tablet   Commonly known as:  DESYREL   Quantity:  90 tablet        TAKE 1 TABLET(100 MG) BY MOUTH AT BEDTIME   Refills:  1        * Notice:  This list has 7 medication(s) that are the same as other medications prescribed for you. Read the directions carefully, and ask your doctor or other care provider to review them with you.            Prescriptions were sent or printed at these locations (2 Prescriptions)                   Other Prescriptions                Printed at Department/Unit printer (2 of 2)         ranitidine (ZANTAC) 150 MG tablet               sulfamethoxazole-trimethoprim (BACTRIM DS/SEPTRA DS) 800-160 MG per tablet                Procedures and tests performed during your visit     Procedure/Test Number of Times Performed    CBC with platelets differential 1    CT Abdomen Pelvis w Contrast 1    Chest XR,  PA & LAT 1    Comprehensive metabolic panel 1    EKG 12-lead, tracing only 1    Give 20 ounces of water 15 minutes before CT of abdomen 1    Lactic acid whole blood 1    Lipase 1    Troponin I 1    UA with Microscopic 1    Urine Culture 2      Orders Needing Specimen Collection     None      Pending Results     Date and Time Order Name Status Description    12/10/2017 1729 Urine Culture In process     12/10/2017 1630 Chest XR,  PA & LAT Preliminary     12/10/2017 1629 EKG 12-lead, tracing only Preliminary     12/10/2017 1629 CT Abdomen Pelvis w Contrast Preliminary             Pending Culture Results     Date and Time Order Name Status Description    12/10/2017 1729 Urine Culture In process             Pending Results Instructions     If you had any lab results that were not finalized at the time of your Discharge, you can call the ED Lab  Result RN at [phone]. You will be contacted by this team for any positive Lab results or changes in treatment. The nurses are available 7 days a week from 10A to 6:30P.  You can leave a message 24 hours per day and they will return your call.        Test Results From Your Hospital Stay        12/10/2017  4:50 PM      Component Results     Component Value Ref Range & Units Status    WBC 13.1 (H) 4.0 - 11.0 10e9/L Final    RBC Count 6.17 (H) 3.8 - 5.2 10e12/L Final    Hemoglobin 16.6 (H) 11.7 - 15.7 g/dL Final    Hematocrit 51.1 (H) 35.0 - 47.0 % Final    MCV 83 78 - 100 fl Final    MCH 26.9 26.5 - 33.0 pg Final    MCHC 32.5 31.5 - 36.5 g/dL Final    RDW 13.6 10.0 - 15.0 % Final    Platelet Count 343 150 - 450 10e9/L Final    Diff Method Automated Method  Final    % Neutrophils 86.6 % Final    % Lymphocytes 10.1 % Final    % Monocytes 2.3 % Final    % Eosinophils 0.5 % Final    % Basophils 0.3 % Final    % Immature Granulocytes 0.2 % Final    Nucleated RBCs 0 0 /100 Final    Absolute Neutrophil 11.4 (H) 1.6 - 8.3 10e9/L Final    Absolute Lymphocytes 1.3 0.8 - 5.3 10e9/L Final    Absolute Monocytes 0.3 0.0 - 1.3 10e9/L Final    Absolute Eosinophils 0.1 0.0 - 0.7 10e9/L Final    Absolute Basophils 0.0 0.0 - 0.2 10e9/L Final    Abs Immature Granulocytes 0.0 0 - 0.4 10e9/L Final    Absolute Nucleated RBC 0.0  Final         12/10/2017  5:17 PM      Component Results     Component Value Ref Range & Units Status    Sodium 133 133 - 144 mmol/L Final    Potassium 4.2 3.4 - 5.3 mmol/L Final    Chloride 96 94 - 109 mmol/L Final    Carbon Dioxide 32 20 - 32 mmol/L Final    Anion Gap 5 3 - 14 mmol/L Final    Glucose 217 (H) 70 - 99 mg/dL Final    Urea Nitrogen 19 7 - 30 mg/dL Final    Creatinine 0.85 0.52 - 1.04 mg/dL Final    GFR Estimate 68 >60 mL/min/1.7m2 Final    Non  GFR Calc    GFR Estimate If Black 82 >60 mL/min/1.7m2 Final    African American GFR Calc    Calcium 10.3 (H) 8.5 - 10.1 mg/dL Final     Bilirubin Total 0.7 0.2 - 1.3 mg/dL Final    Albumin 3.4 3.4 - 5.0 g/dL Final    Protein Total 8.3 6.8 - 8.8 g/dL Final    Alkaline Phosphatase 140 40 - 150 U/L Final    ALT 32 0 - 50 U/L Final    AST 19 0 - 45 U/L Final         12/10/2017  5:17 PM      Component Results     Component Value Ref Range & Units Status    Lipase 98 73 - 393 U/L Final         12/10/2017  5:18 PM      Component Results     Component Value Ref Range & Units Status    Troponin I ES <0.015 0.000 - 0.045 ug/L Final    The 99th percentile for upper reference range is 0.045 ug/L.  Troponin values   in the range of 0.045 - 0.120 ug/L may be associated with risks of adverse   clinical events.           12/10/2017  6:00 PM      Narrative     CT ABDOMEN AND PELVIS WITH CONTRAST 12/10/2017 5:50 PM     HISTORY: Upper abdominal pain, vomiting.     COMPARISON: 7/26/2017.    TECHNIQUE: Volumetric helical acquisition of CT images from the lung  bases through the symphysis pubis after the administration of 100mL  Isovue-370  intravenous contrast. Radiation dose for this scan was  reduced using automated exposure control, adjustment of the mA and/or  kV according to patient size, or iterative reconstruction technique.    FINDINGS: A few low-dense lesions are seen in the kidneys which are  too small to definitively characterize and therefore indeterminate,  but based on prevalence in a patient without a known malignancy are  most likely small cysts. A few definitive cysts are noted. Mild fatty  change of liver. Gallbladder unremarkable. Adrenal glands, spleen, and  pancreas unremarkable. There are mild atherosclerotic changes of the  visualized aorta and its branches. There is no evidence of aortic  dissection or aneurysm. There are no dilated loops of small intestine  or large bowel to suggest ileus or obstruction. No free fluid. No free  air. There are no lymph nodes that are abnormal by size criteria.   Suprapubic catheter noted. Low ventral hernias in  the midline again  noted and not significantly changed. No hydronephrosis. Appendix not  confidently identified. Survey of the visualized bony structures  demonstrates no destructive bony lesions. The visualized lung bases  are unremarkable. Stable elevated right hemidiaphragm.        Impression     IMPRESSION: No definite acute process demonstrated in the abdomen and  pelvis. No definite change from comparison.           12/10/2017  5:25 PM      Component Results     Component Value Ref Range & Units Status    Color Urine Arianna  Final    Appearance Urine Cloudy  Final    Glucose Urine 50 (A) NEG^Negative mg/dL Final    Bilirubin Urine Negative NEG^Negative Final    Ketones Urine Negative NEG^Negative mg/dL Final    Specific Gravity Urine 1.021 1.003 - 1.035 Final    Blood Urine Negative NEG^Negative Final    pH Urine 7.0 5.0 - 7.0 pH Final    Protein Albumin Urine 30 (A) NEG^Negative mg/dL Final    Urobilinogen mg/dL 2.0 0.0 - 2.0 mg/dL Final    Nitrite Urine Positive (A) NEG^Negative Final    Leukocyte Esterase Urine Large (A) NEG^Negative Final    Source Catheterized Urine  Final    WBC Urine 45 (H) 0 - 2 /HPF Final    RBC Urine 2 0 - 2 /HPF Final    Bacteria Urine Many (A) NEG^Negative /HPF Final    Squamous Epithelial /HPF Urine <1 0 - 1 /HPF Final    Amorphous Crystals Many (A) NEG^Negative /HPF Final         12/10/2017  6:06 PM      Narrative     XR CHEST 2 VW   12/10/2017 6:03 PM     HISTORY: chest pain;     COMPARISON: None.    FINDINGS: The heart is negative.  The lungs are clear. The pulmonary  vasculature is normal.  The bones and soft tissues are unremarkable.        Impression     IMPRESSION: No active infiltrate.             12/10/2017  4:50 PM      Component Results     Component Value Ref Range & Units Status    Lactic Acid 1.7 0.7 - 2.0 mmol/L Final         12/10/2017  5:53 PM         12/10/2017  7:12 PM      Component Results     Component    Specimen Description    Catheterized Urine    Culture  Micro    Canceled, Test credited  Duplicate request                  Clinical Quality Measure: Blood Pressure Screening     Your blood pressure was checked while you were in the emergency department today. The last reading we obtained was  BP: 117/85 . Please read the guidelines below about what these numbers mean and what you should do about them.  If your systolic blood pressure (the top number) is less than 120 and your diastolic blood pressure (the bottom number) is less than 80, then your blood pressure is normal. There is nothing more that you need to do about it.  If your systolic blood pressure (the top number) is 120-139 or your diastolic blood pressure (the bottom number) is 80-89, your blood pressure may be higher than it should be. You should have your blood pressure rechecked within a year by a primary care provider.  If your systolic blood pressure (the top number) is 140 or greater or your diastolic blood pressure (the bottom number) is 90 or greater, you may have high blood pressure. High blood pressure is treatable, but if left untreated over time it can put you at risk for heart attack, stroke, or kidney failure. You should have your blood pressure rechecked by a primary care provider within the next 4 weeks.  If your provider in the emergency department today gave you specific instructions to follow-up with your doctor or provider even sooner than that, you should follow that instruction and not wait for up to 4 weeks for your follow-up visit.        Thank you for choosing [facility]       Thank you for choosing [facility] for your care. Our goal is always to provide you with excellent care. Hearing back from our patients is one way we can continue to improve our services. Please take a few minutes to complete the written survey that you may receive in the mail after you visit with us. Thank you!        [facility] Information     [facility] gives you secure access to your electronic health record. If you  see a primary care provider, you can also send messages to your care team and make appointments. If you have questions, please call your primary care clinic.  If you do not have a primary care provider, please call [phone] and they will assist you.        Care EveryWhere ID     This is your Care EveryWhere ID. This could be used by other organizations to access your [facility] medical records  [id number]        Equal Access to Services     JOSE LUIS GOMEZ : Noni Lujan, wachanda conner, qazoieta kaalmada taco, rowan gilbert. So [facility] [phone].    ATENCIÓN: Si habla junior, tiene a shay disposición servicios gratuitos de asistencia lingüística. Llame al [phone].    We comply with applicable federal civil rights laws and [place] laws. We do not discriminate on the basis of race, color, national origin, age, disability, sex, sexual orientation, or gender identity.            After Visit Summary       This is your record. Keep this with you and show to your community pharmacist(s) and doctor(s) at your next visit.

## 2017-12-10 NOTE — ED AVS SNAPSHOT
[facility] Emergency Department    [address]    [facility] [address]    Phone:  [phone]    Fax:  [phone]                                       Bhavani White   MRN: [id number]    Department:  [facility] Emergency Department   Date of Visit:  12/10/2017           After Visit Summary Signature Page     I have received my discharge instructions, and my questions have been answered. I have discussed any challenges I see with this plan with the nurse or doctor.    ..........................................................................................................................................  Patient/Patient Representative Signature      ..........................................................................................................................................  Patient Representative Print Name and Relationship to Patient    ..................................................               ................................................  Date                                            Time    ..........................................................................................................................................  Reviewed by Signature/Title    ...................................................              ..............................................  Date                                                            Time

## 2017-12-10 NOTE — ED PROVIDER NOTES
History     Chief Complaint:  Abdominal Pain    HPI   Bhavani White is a 62 year old female with a complex past medical history, including frequent UTI (chronic supra-pubic catheter with neurogenic bladder), ulcer disease, diabetes mellitus, and chronic pain (on methadone), who presents to the emergency department today by ambulance for evaluation of abdominal pain. The patient has a history of abdominal pain, but states worsening pain today and has been unable to keep down Methadone due to nausea and vomiting. The abdominal pain has been intermittent for 3 weeks, and is brought on by eating and by taking pills.  Her current abdominal pain feels similar to prior episodes though simply more severe in nature. She states the pain originates in her central abdomen and radiates upwards into her chest. On worse days, she vomits approximately 3 times per day. Her last bowel movement was today, which she states consisted of hard stool but no presence of blood. She reports sweating/feeling hot and decreased appetite, but denies fever.  She does report a history of recurrent urinary tract infections and has a indwelling suprapubic catheter in place.  She has noted some drainage around the catheter site, which she acknowledges is similar to what she notes with prior urinary tract infections.  Patient currently lives independently at home with her  uses a wheelchair to get around.  She does have a prior history of gastric ulcers.  Past surgical history significant for hysterectomy.    Allergies:  Povidone Iodine  Toradol [Ketorolac]  Tramadol    Medications:    simvastatin (ZOCOR) 20 MG tablet  traZODone (DESYREL) 100 MG tablet  metFORMIN (GLUCOPHAGE-XR) 500 MG 24 hr tablet  levothyroxine (SYNTHROID/LEVOTHROID) 100 MCG tablet  lisinopril (PRINIVIL/ZESTRIL) 2.5 MG tablet  oxyCODONE (ROXICODONE) 10 MG IR tablet  buPROPion (WELLBUTRIN XL) 300 MG 24 hr tablet  ondansetron (ZOFRAN ODT) 4 MG ODT tab  methadone (DOLOPHINE)  10 MG tablet  diazepam (VALIUM) 5 MG tablet  senna-docusate (SENOKOT-S;PERICOLACE) 8.6-50 MG per tablet  gabapentin (NEURONTIN) 600 MG tablet  nystatin (MYCOSTATIN) 776573 UNIT/GM POWD  albuterol (PROAIR HFA, PROVENTIL HFA, VENTOLIN HFA) 108 (90 BASE) MCG/ACT inhaler  albuterol (2.5 MG/3ML) 0.083% nebulizer solution  Phenazopyridine HCl (PYRIDIUM PO)  DULoxetine HCl (CYMBALTA PO)  tiZANidine (ZANAFLEX) 4 MG tablet  aspirin 81 MG tablet  MIRALAX OR    Past Medical History:    Anxiety   Asthma   Depression   DM (diabetes mellitus) (H)   Frequent UTI   Blood transfusion   Ulcer disease   Hypertension   Hypothyroid   Iatrogenic Cushing's disease (H)   Morbid obesity (H)   MRSA (methicillin resistant staph aureus) culture positive   Osteoarthritis   Other chronic pain   Sleep apnea    Past Surgical History:    Abdomen surgery   Laminectomy, facetectomy, lumbar  Total knee arthroplasty  Cystoscopy, intravesical injection  Discectomy, fusion cervical anterior three+ levels, combined  Suprapubic catheter  Double hernia repair  Hysterectomy  Left and right cataract surgery and left laser revision  Left carpal tunnel release  Tonsillectomy    Family History:    Hypertension Mother   HEART DISEASE Mother   Depression Mother   Hypertension Father   Connective Tissue Disorder Father   Colon cancer Father   Prostate cancer Father   Bladder Cancer Father   Depression Son   Depression Sister    Social History:  The patient was accompanied to the ED by her .  Smoking Status: Never Smoker  Smokeless Tobacco: Never Used  Alcohol Use: Negative   Marital Status:       Review of Systems   Constitutional: Positive for diaphoresis. Negative for appetite change and fever.   Gastrointestinal: Positive for abdominal pain, nausea and vomiting. Negative for blood in stool.   All other systems reviewed and are negative.    Physical Exam     Patient Vitals for the past 24 hrs:   BP Temp Temp src Pulse Resp SpO2   12/10/17 1915  130/75 - - 65 - 96 %   12/10/17 1900 - - - - - 95 %   12/10/17 1845 117/85 - - 62 - 93 %   12/10/17 1830 (!) 141/91 - - - - 95 %   12/10/17 1815 147/90 - - - - 93 %   12/10/17 1800 119/85 - - - - -   12/10/17 1715 (!) 156/96 - - 59 - 95 %   12/10/17 1700 (!) 185/105 - - - - 94 %   12/10/17 1645 (!) 193/104 - - - - 93 %   12/10/17 1620 (!) 190/91 98.8  F (37.1  C) Oral 57 18 96 %     Physical Exam  General:                        Well-nourished                        Speaking in full sentences                        Obese appearing female  Eyes:                        Conjunctiva without injection or scleral icterus                        PERRL  ENT:                        Moist mucous membranes                        Posterior oropharynx clear without erythema or exudate                        Nares patent                        Pinnae normal  Neck:                        Full ROM                        No stiffness appreciated  Resp:                        Lungs CTAB                        No crackles, wheezing or audible rubs                        Good air movement  CV:                                        Normal rate, regular rhythm                        S1 and S2 present                        No murmur, gallop or rub  GI:                        BS present                        Abdomen soft without distention                        Protuberant abdomen                        Upper abdominal tenderness, maximal to epigastric region                        Well healed surgical scars             Supra-pubic catheter in place                        No guarding or rebound tenderness  Skin:                        Warm, dry, well perfused                        No rashes or open wounds on exposed skin  MSK:                        Moves all extremities                        No focal deformities or swelling  Neuro:                        Alert                        Answers questions appropriately                         Moves all extremities equally  Psych:                        Normal affect, normal mood    Emergency Department Course     ECG:  ECG taken at 1641, ECG read at 1650  Sinus bradycardia with marked sinus arrhythmia  Left ventricular hypertrophy with QRS widening and repolarization abnormality  Abnormal ECG  Rate 55 bpm. TN interval 138 ms. QRS duration 132 ms. QT/QTc 440/420 ms. P-R-T axes -17 -15 60.    Imaging:  Radiology findings were communicated with the patient who voiced understanding of the findings.    Chest XR,  PA & LAT  IMPRESSION: No active infiltrate.      Reading per radiology     CT Abdomen Pelvis w Contrast  IMPRESSION: No definite acute process demonstrated in the abdomen and  pelvis. No definite change from comparison.      Laboratory:  Laboratory findings were communicated with the patient who voiced understanding of the findings.    Urine culture: Pending  UA with Microscopic: GLC 50 (A), Protein Albumin 30 (A), Nitrite Positive (A), Leukocyte Esterase Large (A), WBC/HPF 45 (H), Bacteria Many (A), Amorphous crystals Many (A) o/w WNL    CBC: WBC 13.1 (H), HGB 16.6 (H),   CMP: Glucose (Collected 1628) 217 (H), Calcium 100.3 (H) o/w WNL (Creatinine 0.85)  Lipase: 98  Troponin I (Collected 1628) <0.015   Lactic acid whole blood (Collected 1628): 1.7    Interventions:  1645 Dilaudid 0.5 mg IV   1645 Zofran 4 mg IV   1646 Pepcid 20 mg IV   1727 Dilaudid 0.5 mg IV     Emergency Department Course:    Nursing notes and vitals reviewed.    1621 I performed an exam of the patient as documented above.     IV was inserted and blood was drawn for laboratory testing, results above.     The patient provided a urine sample here in the emergency department. This was sent for laboratory testing, findings above.     The patient was sent for a abdomen pelvis CT and chest x-ray while in the emergency department, results above.      1851 I rechecked the patient and she feels completely asymptomatic.     2030:  We discussed options for further management and disposition including hospital observation versus discharge home with close outpatient follow-up.  Patient is very much eager for discharge home with close outpatient follow-up.  I do feel this is reasonable.  Repeat abdominal exam is soft without localizing tenderness.  I personally reviewed the results of the above studies and imaging studies with the patient and her  at bedside.  Answered all questions prior to discharge.    Impression & Plan      Medical Decision Making:  Bhavani White is a 62 year old female who presents to the emergency department today for evaluation of abdominal pain.  Vital signs on presentation reveal elevated blood pressure otherwise are unremarkable.  Differential diagnosis is broad including do not is not limited to gastritis, peptic ulcer disease, perforation, pancreatitis, cholecystitis, bowel obstruction, urinary tract infection, medication side effect, esophageal spasm, acute coronary syndrome, gastroenteritis, among others.  Given patient's discomfort, and vomiting, I did feel advanced imaging was indicated to evaluate for surgical intra-abdominal process.  Workup included CT scan, chest x-ray, EKG, and laboratory studies.  At present, the exact etiology of patient's abdominal pain is not entirely clear.  She has had similar episodes of recurrent pain off-and-on for the past 3 weeks.  This discomfort is exacerbated with consumption of foods as well as medications.  CT scan demonstrates an unremarkable gallbladder.  There is no evidence of bowel obstruction, perforation, or free intra-abdominal fluid.  Patient does have a few low dense lesions/cysts to the kidneys which she was informed of in which she acknowledges knowing about previously.  Liver function tests reveal normal LFTs and lipase.  They do not suggest an obstructive biliary process.  Patient did undergo nuclear medicine hepatobiliary scan earlier this summer which  was normal.  It is certainly possible this may be representative of gastritis or peptic ulcer disease.  Patient is not currently on antacid therapy and thus will trial a short course of Zantac to see if this alleviates her symptoms.  We also discussed it may help if she avoids taking her medications on an empty stomach.  CT scan otherwise did not reveal evidence of acute process.  Although the appendix cannot be definitively seen, her current presentation and location of abdominal pain is felt unlikely to be representative of acute appendicitis.  I considered referred cardiac ischemia though do feel this to be unlikely.  Again patient notes pain beginning in the upper abdomen with some radiation superiorly.  EKG demonstrates sinus bradycardia with repolarization abnormality though no significant changes compared with prior EKG in our system.  Given her above historical features, reproducible abdominal tenderness, duration of symptoms, negative troponin, and a low pretest probability for ACS, I feel further emergent cardiac evaluation can be deferred safely.  Urinalysis does reveal findings concerning for infection although this is somewhat difficult to interpret as she does have a chronic indwelling suprapubic catheter.  Nonetheless given symptoms at present which feel similar to prior urinary tract infections, she will be started on Bactrim while awaiting results of urine culture, to be taken twice daily for 3 days.  She has tolerated this medication well in the past.  Patient was provided the above analgesia and on reevaluation noting complete resolution of symptoms.  Repeat abdominal exam is soft without localizing tenderness nor development of peritoneal findings.  We discussed the results of the above studies at length.  We discussed options for further management including hospital observation versus discharge home and symptomatic cares.  She is electing for the latter which I find to be reasonable.  She does  have a primary care provider and was encouraged to follow-up in 2-3 days for reevaluation.  She is encouraged to return to the ER with recurrent abdominal pain, vomiting, inability to tolerate oral medications or any other concerns.  She acknowledges she lives close by and feels comfortable with this plan of care.  All questions are answered prior to discharge.    Diagnosis:      ICD-10-CM    1. Abdominal pain, generalized R10.84 Urine Culture     Urine Culture   2. Urinary tract infection without hematuria, site unspecified N39.0    3. Renal cyst N28.1      Disposition:   Home with close primary care provider follow-up    Discharge Medications:    Discharge Medication List as of 12/10/2017  7:17 PM      START taking these medications    Details   ranitidine (ZANTAC) 150 MG tablet Take 1 tablet (150 mg) by mouth 2 times daily for 10 days, Disp-20 tablet, R-0, Local Print      sulfamethoxazole-trimethoprim (BACTRIM DS/SEPTRA DS) 800-160 MG per tablet Take 1 tablet by mouth 2 times daily for 3 days, Disp-6 tablet, R-0, Local Print           Scribe Disclosure:  I, Anel Murrieta, am serving as a scribe at 4:25 PM on 12/10/2017 to document services personally performed by Tong Melgar MD, based on my observations and the provider's statements to me.      [facility] EMERGENCY DEPARTMENT       Tong Melgar MD  12/11/17 7614

## 2017-12-11 ENCOUNTER — TELEPHONE (OUTPATIENT)
Dept: FAMILY MEDICINE | Facility: CLINIC | Age: 62
End: 2017-12-11

## 2017-12-11 LAB — INTERPRETATION ECG - MUSE: NORMAL

## 2017-12-11 NOTE — TELEPHONE ENCOUNTER
Fax from [facility], shows 2 different pharmacies for simvastatin, see report, used [facility] and next refill used [facility], no action needed  Ekta Mccarthy RN, BSN  Message handled by Nurse Triage.

## 2017-12-11 NOTE — DISCHARGE INSTRUCTIONS
Follow-up:  Please follow-up with your primary care provider in 1-2 days for re-evaluation and discussion of your visit to the emergency department today.    Home treatments:  Recommended home therapies include rest, fluids, begin zantac, and antibiotic, and close follow-up.    New prescriptions:  Bactrim  Zantac    Return precautions:  Warning signs which should prompt you to return to the ER include worsening pain, vomiting, fevers, chest pain, or any other new or troubling symptoms.  We are always happy to see you again.    Discharge Instructions  Urinary Tract Infection  You or your child have been diagnosed with a urinary tract infection, or UTI. The urinary tract includes the kidneys (which make urine/pee), ureters (the tubes that carry urine/pee from the kidneys to the bladder), the bladder (which stores urine/pee), and urethra (the tube that carries urine/pee out of the bladder). Urinary tract infections occur when bacteria travel up the urethra into the bladder (bladder infection) and, in some cases, from there into the kidneys (kidney infection).  Generally, every Emergency Department visit should have a follow-up clinic visit with either a primary or a specialty clinic/provider. Please follow-up as instructed by your emergency provider today.  Return to the Emergency Department if:    You or your child have severe back pain.    You or your child are vomiting (throwing up) so that you cannot take your medicine.    You or your child have a new fever (had not previously had a fever) over 101 F.    You or your child have confusion or are very weak, or feel very ill.    Your child seems much more ill, will not wake up, will not respond right, or is crying for a long time and will not calm down.    You or your child are showing signs of dehydration. These signs may include decreased urination (pee), dry mouth/gums/tongue, or decreased activity.    Follow-up with your provider:     Children under 24 months need  to be seen by their regular provider within one week after a diagnosis of a UTI. It may be necessary to do some more tests to look at the child s kidney or bladder.    You should begin to feel better within 24 - 48 hours of starting your antibiotic; follow-up with your regular clinic/doctor/provider if this is not the case.    Treatment:     You will be treated with an antibiotic to kill the bacteria. We have to make an educated guess, based on what we know about common bacteria and antibiotics, as to which antibiotic will work for your infection. We will be correct most times but there will be some cases where the antibiotic chosen is not correct (see urine cultures below).    Take a pain medication such as acetaminophen (Tylenol ) or ibuprofen (Advil , Motrin , Nuprin ).    Phenazopyridine (Pyridium , Uristat ) is a prescription medication that numbs the bladder to reduce the burning pain of some UTIs.  The same medication is available in a non-prescription version (Azo-Standard , Urodol ). This medication will change the color of the urine and tears (usually blue or orange). If you wear contacts, do not wear them while taking this medication as they may be stained by the medication.    Urine Cultures:    If indicated, a urine culture may have been performed today. This test generally takes 24-48 hours to complete so the results are not known at this time. The results can confirm that an infection is present but also determine which antibiotic is effective for the specific bacteria that is causing the infection. If your urine culture shows that the antibiotic you were given today will not work to treat your infection, we will attempt to contact you to make arrangements to change the antibiotic. If the culture confirms that the antibiotic is effective for your infection, you will not be contacted. We often recommend follow-up with your regular physician/provider on the culture results regardless of this  "process.    Antibiotic Warning:     If you have been placed on antibiotics - watch for signs of allergic reaction.  These include rash, lip swelling, difficulty breathing, wheezing, and dizziness.  If you develop any of these symptoms, stop the antibiotic immediately and go to an emergency room or urgent care for evaluation.    Probiotics: If you have been given an antibiotic, you may want to also take a probiotic pill or eat yogurt with live cultures. Probiotics have \"good bacteria\" to help your intestines stay healthy. Studies have shown that probiotics help prevent diarrhea and other intestine problems (including C. diff infection) when you take antibiotics. You can buy these without a prescription in the pharmacy section of the store.   If you were given a prescription for medicine here today, be sure to read all of the information (including the package insert) that comes with your prescription.  This will include important information about the medicine, its side effects, and any warnings that you need to know about.  The pharmacist who fills the prescription can provide more information and answer questions you may have about the medicine.  If you have questions or concerns that the pharmacist cannot address, please call or return to the Emergency Department.   Remember that you can always come back to the Emergency Department if you are not able to see your regular provider in the amount of time listed above, if you get any new symptoms, or if there is anything that worries you.        "

## 2017-12-13 ENCOUNTER — TELEPHONE (OUTPATIENT)
Dept: EMERGENCY MEDICINE | Facility: CLINIC | Age: 62
End: 2017-12-13

## 2017-12-13 DIAGNOSIS — N39.0 URINARY TRACT INFECTION: ICD-10-CM

## 2017-12-13 LAB
BACTERIA SPEC CULT: ABNORMAL
BACTERIA SPEC CULT: ABNORMAL
SPECIMEN SOURCE: ABNORMAL

## 2017-12-13 NOTE — TELEPHONE ENCOUNTER
I did contact Bhavani, advised Dr. Dueñas would like to see her in clinic first, she will call shortly to arrange OV.  Advised to continue Bactrim until complete in the interim.  Bhavani verbalizes understanding of all information.      Neva Jensen RN    [facility] [employer] RN  Lung Nodule and ED Lab Results F/U RN  Epic pool (ED late result f/u RN) : [id number]   # [phone]

## 2017-12-13 NOTE — TELEPHONE ENCOUNTER
[facility] Emergency Department Lab result notification [Adult-Female]    [place] ED lab result protocol used  Urine Culture    Reason for call  Notify of lab results, assess symptoms,  review ED providers recommendations/discharge instructions (if necessary) and advise per ED lab result f/u protocol    Lab Result (including Rx patient on, if applicable)  Final Urine Culture Report on 12/13/17. (Patient has supra-pubic catheter)  [place] ED discharge antibiotic: Sulfamethoxazole-Trimethoprim (Bactrim DS, Septra DS) 800-160 mg PO tablet, 1 tablet by mouth 2 times daily for 3 days  Bacteria #1: >100,000 colonies/mL Escherichia coli is [SUSCEPTIBLE] to ED discharge antibiotic   Bacteria #2: >100,000 colonies/mL Pseudomonas aeruginosa  is [NOT TESTED] to ED discharge antibiotic.   Recommendations in treatment per  ED Lab result protocol.    Information table from ED Provider visit on 12/10/17  ED diagnosis Abdominal pain   ED provider   Tong Melgar MD      Symptoms reported at ED visit (Chief complaint, HPI) Bhavani White is a 62 year old female with a complex past medical history, including frequent UTI (chronic supra-pubic catheter with neurogenic bladder), ulcer disease, diabetes mellitus, and chronic pain (on methadone), who presents to the emergency department today by ambulance for evaluation of abdominal pain. The patient has a history of abdominal pain, but states worsening pain today and has been unable to keep down Methadone due to nausea and vomiting. The abdominal pain has been intermittent for 3 weeks, and is brought on by eating and by taking pills.  Her current abdominal pain feels similar to prior episodes though simply more severe in nature. She states the pain originates in her central abdomen and radiates upwards into her chest. On worse days, she vomits approximately 3 times per day. Her last bowel movement was today, which she states consisted of hard stool but no presence  of blood. She reports sweating/feeling hot and decreased appetite, but denies fever.  She does report a history of recurrent urinary tract infections and has a indwelling suprapubic catheter in place.  She has noted some drainage around the catheter site, which she acknowledges is similar to what she notes with prior urinary tract infections.  Patient currently lives independently at home with her  uses a wheelchair to get around.  She does have a prior history of gastric ulcers.  Past surgical history significant for hysterectomy   ED providers Impression and Plan (applicable information) Bhavani White is a 62 year old female who presents to the emergency department today for evaluation of abdominal pain.  Vital signs on presentation reveal elevated blood pressure otherwise are unremarkable.  Differential diagnosis is broad including do not is not limited to gastritis, peptic ulcer disease, perforation, pancreatitis, cholecystitis, bowel obstruction, urinary tract infection, medication side effect, esophageal spasm, acute coronary syndrome, gastroenteritis, among others.  Given patient's discomfort, and vomiting, I did feel advanced imaging was indicated to evaluate for surgical intra-abdominal process.  Workup included CT scan, chest x-ray, EKG, and laboratory studies.  At present, the exact etiology of patient's abdominal pain is not entirely clear.  She has had similar episodes of recurrent pain off-and-on for the past 3 weeks.  This discomfort is exacerbated with consumption of foods as well as medications.  CT scan demonstrates an unremarkable gallbladder.  There is no evidence of bowel obstruction, perforation, or free intra-abdominal fluid.  Patient does have a few low dense lesions/cysts to the kidneys which she was informed of in which she acknowledges knowing about previously.  Liver function tests reveal normal LFTs and lipase.  They do not suggest an obstructive biliary process.  Patient did  undergo nuclear medicine hepatobiliary scan earlier this summer which was normal.  It is certainly possible this may be representative of gastritis or peptic ulcer disease.  Patient is not currently on antacid therapy and thus will trial a short course of Zantac to see if this alleviates her symptoms.  We also discussed it may help if she avoids taking her medications on an empty stomach.  CT scan otherwise did not reveal evidence of acute process.  Although the appendix cannot be definitively seen, her current presentation and location of abdominal pain is felt unlikely to be representative of acute appendicitis.  I considered referred cardiac ischemia though do feel this to be unlikely.  Again patient notes pain beginning in the upper abdomen with some radiation superiorly.  EKG demonstrates sinus bradycardia with repolarization abnormality though no significant changes compared with prior EKG in our system.  Given her above historical features, reproducible abdominal tenderness, duration of symptoms, negative troponin, and a low pretest probability for ACS, I feel further emergent cardiac evaluation can be deferred safely.  Urinalysis does reveal findings concerning for infection although this is somewhat difficult to interpret as she does have a chronic indwelling suprapubic catheter.  Nonetheless given symptoms at present which feel similar to prior urinary tract infections, she will be started on Bactrim while awaiting results of urine culture, to be taken twice daily for 3 days.  She has tolerated this medication well in the past.  Patient was provided the above analgesia and on reevaluation noting complete resolution of symptoms.  Repeat abdominal exam is soft without localizing tenderness nor development of peritoneal findings.  We discussed the results of the above studies at length.  We discussed options for further management including hospital observation versus discharge home and symptomatic cares.   She is electing for the latter which I find to be reasonable.  She does have a primary care provider and was encouraged to follow-up in 2-3 days for reevaluation.  She is encouraged to return to the ER with recurrent abdominal pain, vomiting, inability to tolerate oral medications or any other concerns.  She acknowledges she lives close by and feels comfortable with this plan of care.  All questions are answered prior to discharge.   Significant Medical hx, if applicable DM, frequent UTI's, MRSA, neurogenic bladder, s/p catheter   Coumadin/Warfarin [Yes /No] No   Creatinine Level (mg/dl) 0.85   Creatinine clearance (ml/min), if applicable 137.7   Pregnant (Yes/No/NA) No   Breastfeeding (Yes/No/NA) No   Allergies Povidone iodine, Toradol   Weight, if applicable 125.6 Kg      RN Assessment (Patient s current Symptoms), include time called.  [Insert Left message here if message left]  Bhavani continues to have urinary incontinence (slightly improved since ED visit) and bladder spasms, which are typical UTI symptoms for her.  Has leakage around s/p site, but this is per baseline.  No new symptoms, no fever.  Has home care nurse, is it possible to have IV put in and have a nurse come?  Has nurse monthly to change cath monthly and PRN as needed.        [place] Emergency Department Provider Name & Recommendations (included time consulted)  ED provider recommending f/u with infusion center or if worse, come back to ED.  No oral outpatient options.  Msg left with infusion center at approx 11:30am, awaiting call back.         PCP follow-up Questions asked: YES       Neva Jensen RN    [facility] RN  Lung Nodule and ED Lab Results F/U RN  Epic pool (ED late result f/u RN) : P [id number]   # [phone]    Copy of Lab result   Order   Urine Culture [[id number]] (Order [id number])   Exam Information   Exam Date Exam Time Accession # Results    12/10/17  4:54 PM [id number]    Component Results   Component Collected  Lab   Specimen Description 12/10/2017  4:54    Catheterized Urine   Culture Micro (Abnormal) 12/10/2017  4:54    >100,000 colonies/mL   Escherichia coli      Culture Micro (Abnormal) 12/10/2017  4:54    >100,000 colonies/mL   Pseudomonas aeruginosa      Culture & Susceptibility   ESCHERICHIA COLI   Antibiotic Interpretation Sensitivity Unit Method Status   AMPICILLIN Sensitive <=2 ug/mL SEVERIANO Final   AMPICILLIN/SULBACTAM Sensitive <=2 ug/mL SEVERIANO Final   CEFAZOLIN Sensitive <=4 ug/mL SEVERIANO Final   Comment: Cefazolin SEVERIANO breakpoints are for the treatment of uncomplicated urinary tract   infections.  For the treatment of systemic infections, please contact the   laboratory for additional testing.   CEFEPIME Sensitive <=1 ug/mL SEVERIANO Final   CEFOXITIN Sensitive <=4 ug/mL SEVERIANO Final   CEFTAZIDIME Sensitive <=1 ug/mL SEVERIANO Final   CEFTRIAXONE Sensitive <=1 ug/mL SEVERIANO Final   CIPROFLOXACIN Sensitive <=0.25 ug/mL SEVERIANO Final   GENTAMICIN Sensitive <=1 ug/mL SEVERIANO Final   LEVOFLOXACIN Sensitive <=0.12 ug/mL SEVERIANO Final   NITROFURANTOIN Sensitive <=16 ug/mL SEVERIANO Final   Piperacillin/Tazo Sensitive <=4 ug/mL SEVERIANO Final   TOBRAMYCIN Sensitive <=1 ug/mL SEVERIANO Final   Trimethoprim/Sulfa Sensitive <=1/19 ug/mL SEVERIANO Final         PSEUDOMONAS AERUGINOSA   Antibiotic Interpretation Sensitivity Unit Method Status   AMIKACIN Sensitive 4 ug/mL SEVERIANO Final   CEFEPIME Sensitive 8 ug/mL SEVERIANO Final   CEFTAZIDIME Sensitive 4 ug/mL SEVERIANO Final   CIPROFLOXACIN Resistant >=4 ug/mL SEVERIANO Final   GENTAMICIN Sensitive 2 ug/mL SEVERIANO Final   LEVOFLOXACIN Resistant >=8 ug/mL SEVERIANO Final   MEROPENEM Sensitive 1 ug/mL SEVERIANO Final   Piperacillin/Tazo Sensitive 16 ug/mL SEVERIANO Final   TOBRAMYCIN Sensitive <=1 ug/mL SEVERIANO Final

## 2017-12-13 NOTE — TELEPHONE ENCOUNTER
Pt calls, wants [facility] to order IV infusion via Home Care, wants to confirm plan with [facility], can this be done? When do you need to see pt?  Routed again, inform pt on cell of plan    Telephone Information:   Mobile [phone]     Ekta Mccarthy RN, BSN  Message handled by Nurse Triage.

## 2017-12-13 NOTE — TELEPHONE ENCOUNTER
Pt sees Dr Rios at  for urology  Made appt for tomorrow    Shaniqua Nelson RN, BS  Clinical Nurse Triage.

## 2017-12-13 NOTE — TELEPHONE ENCOUNTER
Dr. Dueñas,    Please see documentation below.  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  Are you able to order through home care?  Bhavani already has [facility] monthly and PRN apparently.    Thanks  Neva Jensen, RN    [facility] RN  Lung Nodule and ED Lab Results F/U RN  Epic pool (ED late result f/u RN) : P [id number]   # [phone]

## 2017-12-14 ENCOUNTER — OFFICE VISIT (OUTPATIENT)
Dept: FAMILY MEDICINE | Facility: CLINIC | Age: 62
End: 2017-12-14
Payer: COMMERCIAL

## 2017-12-14 ENCOUNTER — TELEPHONE (OUTPATIENT)
Dept: UROLOGY | Facility: CLINIC | Age: 62
End: 2017-12-14

## 2017-12-14 VITALS
HEART RATE: 49 BPM | HEIGHT: 67 IN | SYSTOLIC BLOOD PRESSURE: 116 MMHG | BODY MASS INDEX: 43.47 KG/M2 | WEIGHT: 277 LBS | OXYGEN SATURATION: 99 % | DIASTOLIC BLOOD PRESSURE: 60 MMHG | TEMPERATURE: 97.2 F | RESPIRATION RATE: 12 BRPM

## 2017-12-14 DIAGNOSIS — B96.5 CYSTITIS DUE TO PSEUDOMONAS: Primary | ICD-10-CM

## 2017-12-14 DIAGNOSIS — E11.21 TYPE 2 DIABETES MELLITUS WITH DIABETIC NEPHROPATHY, UNSPECIFIED LONG TERM INSULIN USE STATUS: ICD-10-CM

## 2017-12-14 DIAGNOSIS — J45.41 MODERATE PERSISTENT ASTHMA WITH ACUTE EXACERBATION: ICD-10-CM

## 2017-12-14 DIAGNOSIS — R11.2 NAUSEA AND VOMITING, INTRACTABILITY OF VOMITING NOT SPECIFIED, UNSPECIFIED VOMITING TYPE: ICD-10-CM

## 2017-12-14 DIAGNOSIS — N30.80 CYSTITIS DUE TO PSEUDOMONAS: Primary | ICD-10-CM

## 2017-12-14 DIAGNOSIS — I10 HYPERTENSION GOAL BP (BLOOD PRESSURE) < 140/90: ICD-10-CM

## 2017-12-14 DIAGNOSIS — E03.4 HYPOTHYROIDISM DUE TO ACQUIRED ATROPHY OF THYROID: ICD-10-CM

## 2017-12-14 PROCEDURE — 99214 OFFICE O/P EST MOD 30 MIN: CPT | Performed by: FAMILY MEDICINE

## 2017-12-14 RX ORDER — LEVOTHYROXINE SODIUM 88 UG/1
88 TABLET ORAL DAILY
Qty: 90 TABLET | Refills: 0 | Status: SHIPPED | OUTPATIENT
Start: 2017-12-14 | End: 2018-03-30

## 2017-12-14 RX ORDER — METFORMIN HCL 500 MG
500 TABLET, EXTENDED RELEASE 24 HR ORAL 2 TIMES DAILY WITH MEALS
Qty: 360 TABLET | Refills: 0 | Status: SHIPPED | OUTPATIENT
Start: 2017-12-14 | End: 2018-11-08

## 2017-12-14 RX ORDER — GENTAMICIN 40 MG/ML
INJECTION, SOLUTION INTRAMUSCULAR; INTRAVENOUS
Qty: 8 ML | Refills: 0 | Status: SHIPPED | OUTPATIENT
Start: 2017-12-14 | End: 2018-02-15

## 2017-12-14 RX ORDER — LISINOPRIL 2.5 MG/1
2.5 TABLET ORAL DAILY
Qty: 90 TABLET | Refills: 1 | Status: SHIPPED | OUTPATIENT
Start: 2017-12-14 | End: 2018-06-12

## 2017-12-14 RX ORDER — ONDANSETRON 4 MG/1
4-8 TABLET, ORALLY DISINTEGRATING ORAL EVERY 8 HOURS PRN
Qty: 20 TABLET | Refills: 1 | Status: ON HOLD | OUTPATIENT
Start: 2017-12-14 | End: 2020-01-21

## 2017-12-14 RX ORDER — ALBUTEROL SULFATE 90 UG/1
2 AEROSOL, METERED RESPIRATORY (INHALATION) EVERY 6 HOURS PRN
Qty: 1 INHALER | Refills: 11 | Status: SHIPPED | OUTPATIENT
Start: 2017-12-14 | End: 2018-06-10

## 2017-12-14 NOTE — TELEPHONE ENCOUNTER
Patient's PMD called and stated is infected per rasta cavazos to use genamicin 160mg first day 80 mg 2nd day 80 mg 3rd day IM injections. Orders sent. Karey Lucio LPN Staff Nurse'

## 2017-12-14 NOTE — NURSING NOTE
"Chief Complaint   Patient presents with     ER F/U       Initial /60 (BP Location: Right arm, Patient Position: Chair, Cuff Size: Adult Large)  Pulse (!) 49  Temp 97.2  F (36.2  C) (Oral)  Resp 12  Ht 5' 7\" (1.702 m)  Wt 277 lb (125.6 kg)  SpO2 99%  BMI 43.38 kg/m2 Estimated body mass index is 43.38 kg/(m^2) as calculated from the following:    Height as of this encounter: 5' 7\" (1.702 m).    Weight as of this encounter: 277 lb (125.6 kg).  Medication Reconciliation: complete   "

## 2017-12-14 NOTE — TELEPHONE ENCOUNTER
Cannot locate home care nurse on file, called pt, [facility], nurse Yisel, [phone], LMOVM informing Yisel to call STEFANY Mccarthy, RN, BSN  Message handled by Nurse Triage.

## 2017-12-14 NOTE — TELEPHONE ENCOUNTER
I spoke to the urology clinic today about this patient.  After reviewing her chart they actually recommend an IM gentamycin protocol that they often do in their office.  She would need Gentamycin 160mg IM (in two separate doses) today, then 80mg tomorrow and 80mg on Saturday.  I think that this would be a preferable approach to IV therapy.  However, we don't carry Gentamycin in our clinic or in the  pharmacy.  Can you check with her home care to see if they would be able to do this at her house??

## 2017-12-14 NOTE — PROGRESS NOTES
SUBJECTIVE:   Bhavani White is a 62 year old female who presents to clinic today for the following health issues:      ED/UC Followup:    Facility:  [facility]   Date of visit: 12/10/17  Reason for visit: Abdominal pain  Current Status: not feeling better     Patient presented to the ER with abd pain and bladder spasms.  Urine culture grew pan-susceptible E.coli, but also pseudomonas with resistance to all oral abx.  Patient has now finished a course of oral Bactrim.  She continues to note bladder spasms, but denies fevers.           Problem list and histories reviewed & adjusted, as indicated.  Additional history: as documented    Patient Active Problem List   Diagnosis     Chronic low back pain     Moderate major depression (H)     Anxiety     Type 2 diabetes mellitus with diabetic nephropathy (H)     Hypothyroidism due to acquired atrophy of thyroid     Suprapubic catheter (H)     Lumbar spinal stenosis     Fibromyalgia     Osteoarthritis     Hypertension goal BP (blood pressure) < 140/90     Health Care Home     Neuropathy     Hyperlipidemia LDL goal <100     ACP (advance care planning)     Recurrent UTI (urinary tract infection)     Controlled substance agreement signed 10/24/2014     Chronic narcotic dependence (H)     History of ulcer disease     Wheelchair bound     Asthma     Personal history of methicillin resistant Staphylococcus aureus     Fracture of lower extremity     Neurogenic bladder     Ventral hernia     Osteoarthritis of cervical spine with myelopathy     DISH (diffuse idiopathic skeletal hyperostosis)     Morbid obesity, unspecified obesity type (H)     Methadone use (H)     Past Surgical History:   Procedure Laterality Date     ABDOMEN SURGERY       C LAMINECTOMY,FACETECTOMY,LUMBAR  1982     C TOTAL KNEE ARTHROPLASTY  1999    left     CYSTOSCOPY, INTRAVESICAL INJECTION N/A 8/23/2017    Procedure: CYSTOSCOPY, INTRAVESICAL INJECTION;  Cystoscopy, Botox Injection Into The Bladder  Latex  "Allergy ;  Surgeon: Yoandy Rios MD;  Location: [facility] OR     DISCECTOMY, FUSION CERVICAL ANTERIOR THREE+ LEVELS, COMBINED Left 5/3/2016    Procedure: COMBINED DISCECTOMY, FUSION CERVICAL ANTERIOR THREE+ LEVELS;  Surgeon: Jasvir Torres MD;  Location: [facility] OR     GENITOURINARY SURGERY      suprapubic catheter     HERNIA REPAIR  1957    double hernia     HYSTERECTOMY, PAP NO LONGER INDICATED      CELIA and large ovarian tumor removed     SURGICAL HISTORY OF -       left cataract surgery     SURGICAL HISTORY OF -   12/14    right cataract surgery and left laser revision     SURGICAL HISTORY OF -   March 2015    left carpal tunnel release     TONSILLECTOMY  1974       Social History   Substance Use Topics     Smoking status: Never Smoker     Smokeless tobacco: Never Used     Alcohol use No     Family History   Problem Relation Age of Onset     Depression Son      Hypertension Mother      HEART DISEASE Mother      bypass     Depression Mother      Hypertension Father      Connective Tissue Disorder Father      ankylosing spondylitis     CANCER Father      colon; prostate     Other Cancer Father      bladder cancer     Depression Sister              Reviewed and updated as needed this visit by clinical staff       Reviewed and updated as needed this visit by Provider         ROS:  Constitutional, HEENT, cardiovascular, pulmonary, gi and gu systems are negative, except as otherwise noted.      OBJECTIVE:   /60 (BP Location: Right arm, Patient Position: Chair, Cuff Size: Adult Large)  Pulse (!) 49  Temp 97.2  F (36.2  C) (Oral)  Resp 12  Ht 5' 7\" (1.702 m)  Wt 277 lb (125.6 kg)  SpO2 99%  BMI 43.38 kg/m2  Body mass index is 43.38 kg/(m^2).  GENERAL: healthy, alert and no distress  RESP: lungs clear to auscultation - no rales, rhonchi or wheezes  CV: regular rates and rhythm, normal S1 S2, no S3 or S4 and no murmur, click or rub  ABDOMEN: Obese, soft, mild TTP in epigastrum, mild suprapubic " tenderness, suprapubic catheter insertion site is clean and dry with no surrounding erythema    Diagnostic Test Results:  Results for orders placed or performed during the hospital encounter of 12/10/17   CT Abdomen Pelvis w Contrast    Narrative    CT ABDOMEN AND PELVIS WITH CONTRAST 12/10/2017 5:50 PM     HISTORY: Upper abdominal pain, vomiting.     COMPARISON: 7/26/2017.    TECHNIQUE: Volumetric helical acquisition of CT images from the lung  bases through the symphysis pubis after the administration of 100mL  Isovue-370  intravenous contrast. Radiation dose for this scan was  reduced using automated exposure control, adjustment of the mA and/or  kV according to patient size, or iterative reconstruction technique.    FINDINGS: A few low-dense lesions are seen in the kidneys which are  too small to definitively characterize and therefore indeterminate,  but based on prevalence in a patient without a known malignancy are  most likely small cysts. A few definitive cysts are noted. Mild fatty  change of liver. Gallbladder unremarkable. Adrenal glands, spleen, and  pancreas unremarkable. There are mild atherosclerotic changes of the  visualized aorta and its branches. There is no evidence of aortic  dissection or aneurysm. There are no dilated loops of small intestine  or large bowel to suggest ileus or obstruction. No free fluid. No free  air. There are no lymph nodes that are abnormal by size criteria.   Suprapubic catheter noted. Low ventral hernias in the midline again  noted and not significantly changed. No hydronephrosis. Appendix not  confidently identified. Survey of the visualized bony structures  demonstrates no destructive bony lesions. The visualized lung bases  are unremarkable. Stable elevated right hemidiaphragm.      Impression    IMPRESSION: No definite acute process demonstrated in the abdomen and  pelvis. No definite change from comparison.      ANT WHEAT MD   Chest XR,  PA & LAT    Narrative     XR CHEST 2 VW   12/10/2017 6:03 PM     HISTORY: chest pain;     COMPARISON: None.    FINDINGS: The heart is negative.  The lungs are clear. The pulmonary  vasculature is normal.  The bones and soft tissues are unremarkable.      Impression    IMPRESSION: No active infiltrate.        ABDI SAINZ MD   CBC with platelets differential   Result Value Ref Range    WBC 13.1 (H) 4.0 - 11.0 10e9/L    RBC Count 6.17 (H) 3.8 - 5.2 10e12/L    Hemoglobin 16.6 (H) 11.7 - 15.7 g/dL    Hematocrit 51.1 (H) 35.0 - 47.0 %    MCV 83 78 - 100 fl    MCH 26.9 26.5 - 33.0 pg    MCHC 32.5 31.5 - 36.5 g/dL    RDW 13.6 10.0 - 15.0 %    Platelet Count 343 150 - 450 10e9/L    Diff Method Automated Method     % Neutrophils 86.6 %    % Lymphocytes 10.1 %    % Monocytes 2.3 %    % Eosinophils 0.5 %    % Basophils 0.3 %    % Immature Granulocytes 0.2 %    Nucleated RBCs 0 0 /100    Absolute Neutrophil 11.4 (H) 1.6 - 8.3 10e9/L    Absolute Lymphocytes 1.3 0.8 - 5.3 10e9/L    Absolute Monocytes 0.3 0.0 - 1.3 10e9/L    Absolute Eosinophils 0.1 0.0 - 0.7 10e9/L    Absolute Basophils 0.0 0.0 - 0.2 10e9/L    Abs Immature Granulocytes 0.0 0 - 0.4 10e9/L    Absolute Nucleated RBC 0.0    Comprehensive metabolic panel   Result Value Ref Range    Sodium 133 133 - 144 mmol/L    Potassium 4.2 3.4 - 5.3 mmol/L    Chloride 96 94 - 109 mmol/L    Carbon Dioxide 32 20 - 32 mmol/L    Anion Gap 5 3 - 14 mmol/L    Glucose 217 (H) 70 - 99 mg/dL    Urea Nitrogen 19 7 - 30 mg/dL    Creatinine 0.85 0.52 - 1.04 mg/dL    GFR Estimate 68 >60 mL/min/1.7m2    GFR Estimate If Black 82 >60 mL/min/1.7m2    Calcium 10.3 (H) 8.5 - 10.1 mg/dL    Bilirubin Total 0.7 0.2 - 1.3 mg/dL    Albumin 3.4 3.4 - 5.0 g/dL    Protein Total 8.3 6.8 - 8.8 g/dL    Alkaline Phosphatase 140 40 - 150 U/L    ALT 32 0 - 50 U/L    AST 19 0 - 45 U/L   Lipase   Result Value Ref Range    Lipase 98 73 - 393 U/L   Troponin I   Result Value Ref Range    Troponin I ES <0.015 0.000 - 0.045 ug/L   UA with  Microscopic   Result Value Ref Range    Color Urine Arianna     Appearance Urine Cloudy     Glucose Urine 50 (A) NEG^Negative mg/dL    Bilirubin Urine Negative NEG^Negative    Ketones Urine Negative NEG^Negative mg/dL    Specific Gravity Urine 1.021 1.003 - 1.035    Blood Urine Negative NEG^Negative    pH Urine 7.0 5.0 - 7.0 pH    Protein Albumin Urine 30 (A) NEG^Negative mg/dL    Urobilinogen mg/dL 2.0 0.0 - 2.0 mg/dL    Nitrite Urine Positive (A) NEG^Negative    Leukocyte Esterase Urine Large (A) NEG^Negative    Source Catheterized Urine     WBC Urine 45 (H) 0 - 2 /HPF    RBC Urine 2 0 - 2 /HPF    Bacteria Urine Many (A) NEG^Negative /HPF    Squamous Epithelial /HPF Urine <1 0 - 1 /HPF    Amorphous Crystals Many (A) NEG^Negative /HPF   Lactic acid whole blood   Result Value Ref Range    Lactic Acid 1.7 0.7 - 2.0 mmol/L   EKG 12-lead, tracing only   Result Value Ref Range    Interpretation ECG Click View Image link to view waveform and result    Urine Culture   Result Value Ref Range    Specimen Description Catheterized Urine     Culture Micro >100,000 colonies/mL  Escherichia coli   (A)     Culture Micro >100,000 colonies/mL  Pseudomonas aeruginosa   (A)        Susceptibility    Escherichia coli - SEVERIANO     AMPICILLIN <=2 Sensitive ug/mL     CEFAZOLIN* <=4 Sensitive ug/mL      * Cefazolin SEVERIANO breakpoints are for the treatment of uncomplicated urinary tract infections.  For the treatment of systemic infections, please contact the laboratory for additional testing.     CEFOXITIN <=4 Sensitive ug/mL     CEFTAZIDIME <=1 Sensitive ug/mL     CEFTRIAXONE <=1 Sensitive ug/mL     CIPROFLOXACIN <=0.25 Sensitive ug/mL     GENTAMICIN <=1 Sensitive ug/mL     LEVOFLOXACIN <=0.12 Sensitive ug/mL     NITROFURANTOIN <=16 Sensitive ug/mL     TOBRAMYCIN <=1 Sensitive ug/mL     Trimethoprim/Sulfa <=1/19 Sensitive ug/mL     AMPICILLIN/SULBACTAM <=2 Sensitive ug/mL     Piperacillin/Tazo <=4 Sensitive ug/mL     CEFEPIME <=1 Sensitive ug/mL     Pseudomonas aeruginosa - SEVERIANO     AMIKACIN 4 Sensitive ug/mL     CEFEPIME 8 Sensitive ug/mL     CEFTAZIDIME 4 Sensitive ug/mL     CIPROFLOXACIN >=4 Resistant ug/mL     GENTAMICIN 2 Sensitive ug/mL     LEVOFLOXACIN >=8 Resistant ug/mL     Piperacillin/Tazo 16 Sensitive ug/mL     TOBRAMYCIN <=1 Sensitive ug/mL     MEROPENEM 1 Sensitive ug/mL   Urine Culture   Result Value Ref Range    Specimen Description Catheterized Urine     Culture Micro Canceled, Test credited  Duplicate request          ASSESSMENT/PLAN:     1. Cystitis due to Pseudomonas  - Spoke with pts urologist, who recommends IM Gentamicin   - Home care will give injections   - gentamicin (GARAMYCIN) 40 MG/ML injection; Gentamicin 80mg/2mL dosed as follows:IM injection of 160mg (4mL) on day one, IM injection of 80mg (2mL) on day two, IM injection of 80mg (2mL) on day three  Dispense: 8 mL; Refill: 0    2. Type 2 diabetes mellitus with diabetic nephropathy, unspecified long term insulin use status (H)  - metFORMIN (GLUCOPHAGE-XR) 500 MG 24 hr tablet; Take 1 tablet (500 mg) by mouth 2 times daily (with meals)  Dispense: 360 tablet; Refill: 0    3. Hypothyroidism due to acquired atrophy of thyroid  - levothyroxine (SYNTHROID/LEVOTHROID) 88 MCG tablet; Take 1 tablet (88 mcg) by mouth daily  Dispense: 90 tablet; Refill: 0    4. Hypertension goal BP (blood pressure) < 140/90  - lisinopril (PRINIVIL/ZESTRIL) 2.5 MG tablet; Take 1 tablet (2.5 mg) by mouth daily  Dispense: 90 tablet; Refill: 1    5. Nausea and vomiting, intractability of vomiting not specified, unspecified vomiting type  - ondansetron (ZOFRAN ODT) 4 MG ODT tab; Take 1-2 tablets (4-8 mg) by mouth every 8 hours as needed for nausea  Dispense: 20 tablet; Refill: 1    6. Moderate persistent asthma with acute exacerbation  - albuterol (PROAIR HFA/PROVENTIL HFA/VENTOLIN HFA) 108 (90 BASE) MCG/ACT Inhaler; Inhale 2 puffs into the lungs every 6 hours as needed for shortness of breath / dyspnea or wheezing   Dispense: 1 Inhaler; Refill: 11    Follow up in 1 month or sooner PRN     Shawnee Dueñas,   [facility]

## 2017-12-14 NOTE — TELEPHONE ENCOUNTER
Yisel calls back, not working today but checking messages, able to give injections if can get gentamycin injections, has no idea where to , called our pharmacy, they will check if can order, Yisel could  if needed but not normal protocol, pharmacy will update us this am  Ekta Mccarthy, RN, BSN  Message handled by Nurse Triage with [employer] - provider name: RIKA.

## 2017-12-14 NOTE — TELEPHONE ENCOUNTER
Called Yisel, agrees to  Gentamycin injections in am at our pharmacy, cannot  later today, will f/u with out pharmacy in am, pt wants appointments after 1 am, will administer Friday, Saturday and Sunday, also verbal order provided for 3 extra nurse visits, RIKA LIZ, also pharmacy informed of Yisel's phone number if problems  Ekta Mccarthy, RN, BSN  Message handled by Nurse Triage with [employer] - provider name: RIKA.

## 2017-12-14 NOTE — MR AVS SNAPSHOT
After Visit Summary   12/14/2017    Bhavani White    MRN: [id number]           Patient Information     Date Of Birth          1955        Visit Information        Provider Department      12/14/2017 12:20 PM Shawnee Dueñas DO [facility]        Today's Diagnoses     Cystitis due to Pseudomonas    -  1    Type 2 diabetes mellitus with diabetic nephropathy, unspecified long term insulin use status (H)        Hypothyroidism due to acquired atrophy of thyroid        Hypertension goal BP (blood pressure) < 140/90        Nausea and vomiting, intractability of vomiting not specified, unspecified vomiting type        Moderate persistent asthma with acute exacerbation           Follow-ups after your visit        Your next 10 appointments already scheduled     Feb 09, 2018  3:15 PM CST   [facility]ina Montilla NP   [facility]    [address]  [address]  [facility] [address] [phone]              Who to contact     If you have questions or need follow up information about today's clinic visit or your schedule please contact [facility] directly at [phone].  Normal or non-critical lab and imaging results will be communicated to you by FireID[employer], letter or phone within 4 business days after the clinic has received the results. If you do not hear from us within 7 days, please contact the clinic through FireID[employer] or phone. If you have a critical or abnormal lab result, we will notify you by phone as soon as possible.  Submit refill requests through [employer] or call your pharmacy and they will forward the refill request to us. Please allow 3 business days for your refill to be completed.          Additional Information About Your Visit        FireIDhart Information     [employer] gives you secure access to your electronic health record. If you see a primary care provider, you can also send messages  "to your care team and make appointments. If you have questions, please call your primary care clinic.  If you do not have a primary care provider, please call [phone] and they will assist you.        Care EveryWhere ID     This is your Care EveryWhere ID. This could be used by other organizations to access your [facility] medical records  [id number]        Your Vitals Were     Pulse Temperature Respirations Height Pulse Oximetry BMI (Body Mass Index)    49 97.2  F (36.2  C) (Oral) 12 5' 7\" (1.702 m) 99% 43.38 kg/m2       Blood Pressure from Last 3 Encounters:   12/14/17 116/60   12/10/17 130/75   10/27/17 121/77    Weight from Last 3 Encounters:   12/14/17 277 lb (125.6 kg)   10/27/17 277 lb (125.6 kg)   08/23/17 283 lb 11.7 oz (128.7 kg)              Today, you had the following     No orders found for display         Today's Medication Changes          These changes are accurate as of: 12/14/17 11:59 PM.  If you have any questions, ask your nurse or doctor.               Start taking these medicines.        Dose/Directions    gentamicin 40 MG/ML injection   Commonly known as:  GARAMYCIN   Used for:  Cystitis due to Pseudomonas   Started by:  Shawnee Dueñas DO        Gentamicin 80mg/2mL dosed as follows:IM injection of 160mg (4mL) on day one, IM injection of 80mg (2mL) on day two, IM injection of 80mg (2mL) on day three   Quantity:  8 mL   Refills:  0         These medicines have changed or have updated prescriptions.        Dose/Directions    levothyroxine 88 MCG tablet   Commonly known as:  SYNTHROID/LEVOTHROID   This may have changed:    - medication strength  - how much to take   Used for:  Hypothyroidism due to acquired atrophy of thyroid   Changed by:  Shawnee Dueñas DO        Dose:  88 mcg   Take 1 tablet (88 mcg) by mouth daily   Quantity:  90 tablet   Refills:  0         Stop taking these medicines if you haven't already. Please contact your care team if you have questions.     buPROPion 300 " MG 24 hr tablet   Commonly known as:  WELLBUTRIN XL   Stopped by:  Shawnee Dueñas DO           oxyCODONE IR 10 MG tablet   Commonly known as:  ROXICODONE   Stopped by:  Shawnee Dueñas DO                Where to get your medicines      These medications were sent to [facility] [address]  [address] [facility] [address]     Phone:  [phone]     levothyroxine 88 MCG tablet    ondansetron 4 MG ODT tab         These medications were sent to [facility] [address] [place] [address] [address] AT [place] [address]  [address] [facility] [address]    Hours:  24-hours Phone:  [phone]     albuterol 108 (90 BASE) MCG/ACT Inhaler    lisinopril 2.5 MG tablet    metFORMIN 500 MG 24 hr tablet         Some of these will need a paper prescription and others can be bought over the counter.  Ask your nurse if you have questions.     Bring a paper prescription for each of these medications     gentamicin 40 MG/ML injection                Primary Care Provider Office Phone # Fax #    Shawnee Dueñas [phone] [address] [facility] [address]        Equal Access to Services     JOSE LUIS GOMEZ : Hadii aad ku hadasho Soomaali, waaxda luqadaha, qaybta kaalmada adeegyada, waxay rico gilbert. So [facility] [phone].    ATENCIÓN: Si habla español, tiene a shay disposición servicios gratuitos de asistencia lingüística. manju al [phone].    We comply with applicable federal civil rights laws and [place] laws. We do not discriminate on the basis of race, color, national origin, age, disability, sex, sexual orientation, or gender identity.            Thank you!     Thank you for choosing [facility]  for your care. Our goal is always to provide you with excellent care. Hearing back from our patients is one way we can continue to improve our services. Please take a  few minutes to complete the written survey that you may receive in the mail after your visit with us. Thank you!             Your Updated Medication List - Protect others around you: Learn how to safely use, store and throw away your medicines at www.disposemymeds.org.          This list is accurate as of: 12/14/17 11:59 PM.  Always use your most recent med list.                   Brand Name Dispense Instructions for use Diagnosis    * albuterol (2.5 MG/3ML) 0.083% neb solution     30 vial    Take 1 vial (2.5 mg) by nebulization every 4 hours as needed for shortness of breath / dyspnea or wheezing    Moderate persistent asthma with acute exacerbation       * albuterol 108 (90 BASE) MCG/ACT Inhaler    PROAIR HFA/PROVENTIL HFA/VENTOLIN HFA    1 Inhaler    Inhale 2 puffs into the lungs every 6 hours as needed for shortness of breath / dyspnea or wheezing    Moderate persistent asthma with acute exacerbation       amoxicillin-clavulanate 875-125 MG per tablet    AUGMENTIN    20 tablet    Take 1 tablet by mouth 2 times daily    Cat bite, initial encounter       aspirin 81 MG tablet     30 tablet    Take by mouth daily    Type 2 diabetes, HbA1c goal < 7% (H)       blood glucose monitoring lancets     1 Box    Use to test blood sugar 1 times daily or as directed.    Type 2 diabetes mellitus with diabetic nephropathy (H)       blood glucose monitoring meter device kit     1 kit    Use to test blood sugar 1 times daily or as directed.    Type 2 diabetes mellitus with diabetic nephropathy (H)       ciprofloxacin 500 MG tablet    CIPRO    10 tablet    Take 1 tablet (500 mg) by mouth 2 times daily    Urinary tract infection       CYMBALTA PO      Take 120 mg by mouth daily        diazepam 5 MG tablet    VALIUM    30 tablet    Take 1 tablet (5 mg) by mouth every 8 hours as needed for anxiety or other (pain, muscle spasm)    Acute respiratory failure with hypoxia and hypercapnia (H)       gabapentin 600 MG tablet    NEURONTIN      1,200 mg 3 times daily        gentamicin 40 MG/ML injection    GARAMYCIN    8 mL    Gentamicin 80mg/2mL dosed as follows:IM injection of 160mg (4mL) on day one, IM injection of 80mg (2mL) on day two, IM injection of 80mg (2mL) on day three    Cystitis due to Pseudomonas       levothyroxine 88 MCG tablet    SYNTHROID/LEVOTHROID    90 tablet    Take 1 tablet (88 mcg) by mouth daily    Hypothyroidism due to acquired atrophy of thyroid       lisinopril 2.5 MG tablet    PRINIVIL/Zestril    90 tablet    Take 1 tablet (2.5 mg) by mouth daily    Hypertension goal BP (blood pressure) < 140/90       metFORMIN 500 MG 24 hr tablet    GLUCOPHAGE-XR    360 tablet    Take 1 tablet (500 mg) by mouth 2 times daily (with meals)    Type 2 diabetes mellitus with diabetic nephropathy, unspecified long term insulin use status (H)       methadone 10 MG tablet    DOLOPHINE    150 tablet    Take 2 pills TID    Acute respiratory failure with hypoxia and hypercapnia (H)       MIRALAX PO      1 capful daily prn        MULTIVITAMIN PO      1 a day        nystatin 505439 UNIT/GM Powd    MYCOSTATIN    30 g    Apply topically 3 times daily as needed    Candidiasis of skin       ondansetron 4 MG ODT tab    ZOFRAN ODT    20 tablet    Take 1-2 tablets (4-8 mg) by mouth every 8 hours as needed for nausea    Nausea and vomiting, intractability of vomiting not specified, unspecified vomiting type       * order for DME     1 each    Equipment being ordered: Hospital Bed, total electric (head, foot, and height adjustments), with any type side rails, with mattress    Wheelchair bound, Ankylosing spondylitis (H), Spinal stenosis in cervical region, Lumbar spinal stenosis, Fibromyalgia, Chronic low back pain       * order for DME     1 each    Equipment being ordered: Motorized Wheelchair    Wheelchair bound, Ankylosing spondylitis (H), Spinal stenosis in cervical region, Lumbar spinal stenosis, Fibromyalgia, Chronic low back pain       * order for DME     1  each    Equipment being ordered: Trapeze bar for hospital bed    Wheelchair bound, Ankylosing spondylitis (H), Spinal stenosis in cervical region, Lumbar spinal stenosis, Fibromyalgia, Chronic low back pain       * order for DME     1 Package    Equipment being ordered: Nebulizer    Moderate persistent asthma with acute exacerbation       * order for DME     1 each    Equipment being ordered: BIPAP.  15/5, Rate of 12, 2L O2 to keep sats 90-95%.    Acute respiratory failure with hypoxia and hypercapnia (H)       PYRIDIUM PO      Take 2 tablets by mouth 3 times daily as needed Dose unknown        ranitidine 150 MG tablet    ZANTAC    20 tablet    Take 1 tablet (150 mg) by mouth 2 times daily for 10 days        senna-docusate 8.6-50 MG per tablet    SENOKOT-S;PERICOLACE    100 tablet    Take 2 tablets by mouth 2 times daily    Acute respiratory failure with hypoxia and hypercapnia (H)       simvastatin 20 MG tablet    ZOCOR    90 tablet    Take 1 tablet (20 mg) by mouth At Bedtime    Hyperlipidemia LDL goal <100       tiZANidine 4 MG tablet    ZANAFLEX    90 tablet    Take 1 tablet (4 mg) by mouth 3 times daily        traZODone 100 MG tablet    DESYREL    90 tablet    TAKE 1 TABLET(100 MG) BY MOUTH AT BEDTIME    Insomnia       * Notice:  This list has 7 medication(s) that are the same as other medications prescribed for you. Read the directions carefully, and ask your doctor or other care provider to review them with you.

## 2017-12-21 ENCOUNTER — TELEPHONE (OUTPATIENT)
Dept: FAMILY MEDICINE | Facility: CLINIC | Age: 62
End: 2017-12-21

## 2017-12-21 NOTE — TELEPHONE ENCOUNTER
Panel Management Review      Patient has the following on her problem list:     Asthma review     ACT Total Scores 6/1/2017   ACT TOTAL SCORE -   ASTHMA ER VISITS -   ASTHMA HOSPITALIZATIONS -   ACT TOTAL SCORE (Goal Greater than or Equal to 20) 25   In the past 12 months, how many times did you visit the emergency room for your asthma without being admitted to the hospital? 0   In the past 12 months, how many times were you hospitalized overnight because of your asthma? 0      1. Is Asthma diagnosis on the Problem List? Yes    2. Is Asthma listed on Health Maintenance? Yes    3. Patient is due for:  ACT        Composite cancer screening  Chart review shows that this patient is due/due soon for the following None  Summary:    Patient is due/failing the following:   ACT    Action needed:   Patient needs to do ACT.    Type of outreach:    Sent letter.    Questions for provider review:    None                                                                                                                                    [facility]       Chart routed to none .

## 2017-12-21 NOTE — LETTER
[address] [address] [phone]  December 21, 2017    Bhavani White  [address] [facility] [address]  [facility] [address]    Dear Bhavani,    I care about your health and have reviewed your health plan. I have reviewed your medical conditions, medication list, and lab results and am making recommendations based on this review, to better manage your health.    You are in particular need of attention regarding:  -Asthma    I am recommending that you:  {recommendations: -Complete and return the attached ASTHMA CONTROL TEST.  If your total score is 19 or less or you have been to the ER or urgent care for your asthma, then please schedule an asthma followup appointment.    Here is a list of Health Maintenance topics that are due now or due soon:  Health Maintenance Due   Topic Date Due     ASTHMA CONTROL TEST Q6 MOS  12/01/2017       Please call us at [phone] (or use [employer]) to address the above recommendations.     Thank you for trusting [facility] and we appreciate the opportunity to serve you.  We look forward to supporting your healthcare needs in the future.    Healthy Regards,    Shawnee Dueñas DO

## 2018-01-03 ENCOUNTER — TRANSFERRED RECORDS (OUTPATIENT)
Dept: HEALTH INFORMATION MANAGEMENT | Facility: CLINIC | Age: 63
End: 2018-01-03

## 2018-01-15 ENCOUNTER — TELEPHONE (OUTPATIENT)
Dept: FAMILY MEDICINE | Facility: CLINIC | Age: 63
End: 2018-01-15

## 2018-01-15 DIAGNOSIS — J45.41 MODERATE PERSISTENT ASTHMA WITH ACUTE EXACERBATION: ICD-10-CM

## 2018-01-15 RX ORDER — ALBUTEROL SULFATE 0.83 MG/ML
1 SOLUTION RESPIRATORY (INHALATION) EVERY 4 HOURS PRN
Qty: 30 VIAL | Refills: 1 | Status: SHIPPED | OUTPATIENT
Start: 2018-01-15 | End: 2019-03-11

## 2018-01-15 NOTE — TELEPHONE ENCOUNTER
Disp Refills Start End DUANE   albuterol (2.5 MG/3ML) 0.083% nebulizer solution 30 vial 1 1/29/2016  --   Sig: Take 1 vial (2.5 mg) by nebulization every 4 hours as needed for shortness of breath / dyspnea or wheezing     Class: E-Prescribe     Yisel from  Home [facility] called, pt had texted her saying she couldn't breath and nebulizer wasn't wasn't working  Called EMS who gave her a neb, did not bring her to ER    Requesting new nebulizer and refill on meds    Discussed with Dr Weiner who is still here, Dr Dueñas gone already today  OK to fill  Yisel will  equipment and rx jennifer Nelson RN, BS  Clinical Nurse Triage.

## 2018-01-17 ENCOUNTER — TELEPHONE (OUTPATIENT)
Dept: FAMILY MEDICINE | Facility: CLINIC | Age: 63
End: 2018-01-17

## 2018-01-17 NOTE — TELEPHONE ENCOUNTER
Yisel with [facility] calls, [phone], at pt residence, has productive cough, yellowish phlegm 3-4 times a day, improved since Monday after replacing neb machine and new neb solution, does not feel needs antibiotics, O2 98 %, wonders if MP recommends guaifenesin or something for cough, routed to MP, inform pt of plan on cell    Telephone Information:   Mobile [phone]     Ekta Mccarthy, RN, BSN  Message handled by Nurse Triage.

## 2018-01-18 LAB
ALBUMIN UR-MCNC: 30 MG/DL
APPEARANCE UR: CLEAR
BACTERIA #/AREA URNS HPF: ABNORMAL /HPF
BILIRUB UR QL STRIP: NEGATIVE
COLOR UR AUTO: YELLOW
GLUCOSE UR STRIP-MCNC: NEGATIVE MG/DL
HGB UR QL STRIP: ABNORMAL
KETONES UR STRIP-MCNC: NEGATIVE MG/DL
LEUKOCYTE ESTERASE UR QL STRIP: ABNORMAL
MUCOUS THREADS #/AREA URNS LPF: PRESENT /LPF
NITRATE UR QL: NEGATIVE
PH UR STRIP: 5 PH (ref 5–7)
RBC #/AREA URNS AUTO: 6 /HPF (ref 0–2)
SOURCE: ABNORMAL
SP GR UR STRIP: 1.01 (ref 1–1.03)
SQUAMOUS #/AREA URNS AUTO: <1 /HPF (ref 0–1)
UROBILINOGEN UR STRIP-MCNC: 0 MG/DL (ref 0–2)
WBC #/AREA URNS AUTO: 8 /HPF (ref 0–2)

## 2018-01-18 PROCEDURE — 87086 URINE CULTURE/COLONY COUNT: CPT | Performed by: UROLOGY

## 2018-01-18 PROCEDURE — 87106 FUNGI IDENTIFICATION YEAST: CPT | Performed by: UROLOGY

## 2018-01-18 PROCEDURE — 81001 URINALYSIS AUTO W/SCOPE: CPT | Performed by: UROLOGY

## 2018-01-18 NOTE — TELEPHONE ENCOUNTER
Left detailed messaging informing Yisel, call if ?'s  Ekta Mccarthy RN, BSN  Message handled by Nurse Triage.

## 2018-01-20 LAB
BACTERIA SPEC CULT: ABNORMAL
Lab: ABNORMAL
SPECIMEN SOURCE: ABNORMAL

## 2018-01-25 ENCOUNTER — TELEPHONE (OUTPATIENT)
Dept: UROLOGY | Facility: CLINIC | Age: 63
End: 2018-01-25

## 2018-01-25 DIAGNOSIS — N39.0 URINARY TRACT INFECTION: Primary | ICD-10-CM

## 2018-01-25 RX ORDER — FLUCONAZOLE 100 MG/1
TABLET ORAL
Qty: 6 TABLET | Refills: 0 | Status: SHIPPED | OUTPATIENT
Start: 2018-01-25 | End: 2018-02-15

## 2018-01-25 NOTE — TELEPHONE ENCOUNTER
Patient called and told to take diflucan 100 2 pills the first day one pill for the next 4 days and then at the end change out her sp tube.  She will call her home nurse to do that on Monday. Karey Lucio LPN Staff Nurse

## 2018-02-01 ENCOUNTER — TELEPHONE (OUTPATIENT)
Dept: FAMILY MEDICINE | Facility: CLINIC | Age: 63
End: 2018-02-01

## 2018-02-01 NOTE — TELEPHONE ENCOUNTER
Order received from [facility] for Dr hairston to fill out and sign  Sally Villareal/

## 2018-02-09 DIAGNOSIS — N31.9 NEUROGENIC BLADDER: Primary | ICD-10-CM

## 2018-02-09 RX ORDER — CEFAZOLIN SODIUM 1 G/3ML
1 INJECTION, POWDER, FOR SOLUTION INTRAMUSCULAR; INTRAVENOUS SEE ADMIN INSTRUCTIONS
Status: CANCELLED | OUTPATIENT
Start: 2018-02-09

## 2018-02-09 RX ORDER — CEFAZOLIN SODIUM 1 G/50ML
3 SOLUTION INTRAVENOUS
Status: CANCELLED | OUTPATIENT
Start: 2018-02-09

## 2018-02-14 ENCOUNTER — CARE COORDINATION (OUTPATIENT)
Dept: UROLOGY | Facility: CLINIC | Age: 63
End: 2018-02-14

## 2018-02-15 ENCOUNTER — OFFICE VISIT (OUTPATIENT)
Dept: FAMILY MEDICINE | Facility: CLINIC | Age: 63
End: 2018-02-15
Payer: COMMERCIAL

## 2018-02-15 VITALS
HEART RATE: 72 BPM | RESPIRATION RATE: 14 BRPM | WEIGHT: 251 LBS | BODY MASS INDEX: 39.39 KG/M2 | DIASTOLIC BLOOD PRESSURE: 68 MMHG | SYSTOLIC BLOOD PRESSURE: 132 MMHG | HEIGHT: 67 IN | OXYGEN SATURATION: 98 % | TEMPERATURE: 98.4 F

## 2018-02-15 DIAGNOSIS — R22.32 MASS OF LEFT AXILLA: ICD-10-CM

## 2018-02-15 DIAGNOSIS — L30.9 DERMATITIS: ICD-10-CM

## 2018-02-15 DIAGNOSIS — N31.9 NEUROGENIC BLADDER: ICD-10-CM

## 2018-02-15 DIAGNOSIS — E11.21 TYPE 2 DIABETES MELLITUS WITH DIABETIC NEPHROPATHY, WITHOUT LONG-TERM CURRENT USE OF INSULIN (H): ICD-10-CM

## 2018-02-15 DIAGNOSIS — Z01.818 PREOP GENERAL PHYSICAL EXAM: Primary | ICD-10-CM

## 2018-02-15 DIAGNOSIS — J45.20 MILD INTERMITTENT ASTHMA WITHOUT COMPLICATION: ICD-10-CM

## 2018-02-15 LAB
BASOPHILS # BLD AUTO: 0 10E9/L (ref 0–0.2)
BASOPHILS NFR BLD AUTO: 0.3 %
DIFFERENTIAL METHOD BLD: ABNORMAL
EOSINOPHIL # BLD AUTO: 0.3 10E9/L (ref 0–0.7)
EOSINOPHIL NFR BLD AUTO: 2.6 %
ERYTHROCYTE [DISTWIDTH] IN BLOOD BY AUTOMATED COUNT: 15 % (ref 10–15)
HBA1C MFR BLD: 6.8 % (ref 4.3–6)
HCT VFR BLD AUTO: 51.3 % (ref 35–47)
HGB BLD-MCNC: 16.7 G/DL (ref 11.7–15.7)
LYMPHOCYTES # BLD AUTO: 2.5 10E9/L (ref 0.8–5.3)
LYMPHOCYTES NFR BLD AUTO: 24.6 %
MCH RBC QN AUTO: 27.3 PG (ref 26.5–33)
MCHC RBC AUTO-ENTMCNC: 32.6 G/DL (ref 31.5–36.5)
MCV RBC AUTO: 84 FL (ref 78–100)
MONOCYTES # BLD AUTO: 0.6 10E9/L (ref 0–1.3)
MONOCYTES NFR BLD AUTO: 5.5 %
NEUTROPHILS # BLD AUTO: 6.7 10E9/L (ref 1.6–8.3)
NEUTROPHILS NFR BLD AUTO: 67 %
PLATELET # BLD AUTO: 244 10E9/L (ref 150–450)
RBC # BLD AUTO: 6.11 10E12/L (ref 3.8–5.2)
WBC # BLD AUTO: 10 10E9/L (ref 4–11)

## 2018-02-15 PROCEDURE — 85025 COMPLETE CBC W/AUTO DIFF WBC: CPT | Performed by: NURSE PRACTITIONER

## 2018-02-15 PROCEDURE — 87086 URINE CULTURE/COLONY COUNT: CPT | Performed by: NURSE PRACTITIONER

## 2018-02-15 PROCEDURE — 36415 COLL VENOUS BLD VENIPUNCTURE: CPT | Performed by: NURSE PRACTITIONER

## 2018-02-15 PROCEDURE — 83036 HEMOGLOBIN GLYCOSYLATED A1C: CPT | Performed by: NURSE PRACTITIONER

## 2018-02-15 PROCEDURE — 80053 COMPREHEN METABOLIC PANEL: CPT | Performed by: NURSE PRACTITIONER

## 2018-02-15 PROCEDURE — 87088 URINE BACTERIA CULTURE: CPT | Performed by: NURSE PRACTITIONER

## 2018-02-15 PROCEDURE — 87186 SC STD MICRODIL/AGAR DIL: CPT | Performed by: NURSE PRACTITIONER

## 2018-02-15 PROCEDURE — 99214 OFFICE O/P EST MOD 30 MIN: CPT | Performed by: NURSE PRACTITIONER

## 2018-02-15 RX ORDER — DESONIDE 0.5 MG/G
OINTMENT TOPICAL
Qty: 15 G | Refills: 1 | Status: SHIPPED | OUTPATIENT
Start: 2018-02-15 | End: 2018-03-06

## 2018-02-15 NOTE — NURSING NOTE
"Chief Complaint   Patient presents with     Pre-Op Exam     Botox bladder     Mass     left under arm        Initial /68 (BP Location: Right arm, Patient Position: Chair, Cuff Size: Adult Regular)  Pulse 72  Temp 98.4  F (36.9  C) (Oral)  Resp 14  Ht 5' 7\" (1.702 m)  Wt 251 lb (113.9 kg)  SpO2 98%  BMI 39.31 kg/m2 Estimated body mass index is 39.31 kg/(m^2) as calculated from the following:    Height as of this encounter: 5' 7\" (1.702 m).    Weight as of this encounter: 251 lb (113.9 kg).  Medication Reconciliation: complete   "

## 2018-02-15 NOTE — PROGRESS NOTES
[facility]  [address] [facility] [address]  [phone]  Dept: [phone]    PRE-OP EVALUATION:  Today's date: 2/15/2018    Bhavani White (: 1955) presents for pre-operative evaluation assessment as requested by Dr. Yoandy Lisa.  She requires evaluation and anesthesia risk assessment prior to undergoing surgery/procedure for treatment of neurogenic bladder.  Proposed procedure: Botox to Bladder     Date of Surgery/ Procedure: 2018  Time of Surgery/ Procedure: 10 am  Hospital/Surgical Facility: [facility]   Fax number for surgical facility:   Primary Physician: Shawnee Dueñas  Type of Anesthesia Anticipated: Monitor Anesthesia care     Patient has a Health Care Directive or Living Will:  NO    Preop Questions 2018   1.  Do you have a history of heart attack, stroke, stent, bypass or surgery on an artery in the head, neck, heart or legs? No   2.  Do you ever have any pain or discomfort in your chest? No   3.  Do you have a history of  Heart Failure? No   4.   Are you troubled by shortness of breath when:  walking on a level surface, or up a slight hill, or at night? No   5.  Do you currently have a cold, bronchitis or other respiratory infection? No   6.  Do you have a cough, shortness of breath, or wheezing? YES - resolving cough   7.  Do you sometimes get pains in the calves of your legs when you walk? No   8. Do you or anyone in your family have previous history of blood clots? No   9.  Do you or does anyone in your family have a serious bleeding problem such as prolonged bleeding following surgeries or cuts? No   10. Have you ever had problems with anemia or been told to take iron pills? No   11. Have you had any abnormal blood loss such as black, tarry or bloody stools, or abnormal vaginal bleeding? No   12. Have you ever had a blood transfusion? YES - 2013   13. Have you or any of your relatives ever had problems with  anesthesia? No   14. Do you have sleep apnea, excessive snoring or daytime drowsiness? YES - Sleep apnea, she does not tolerate her CPAP   15. Do you have any prosthetic heart valves? No   16. Do you have prosthetic joints? YES - Left knee replacement   17. Is there any chance that you may be pregnant? No     HPI:                                                      Brief HPI related to upcoming procedure: Bhavani reported to the clinic regarding a preoperative examination for a proposed Botox injection procedure to treat bladder. She has a suprapubic catheter.     Type 2 diabetes: Last A1C was 6.7%, taking metformin as prescribed.  hypoglycemia.  Asthma:  Moderate control,ACT of 15. Resolving cold symptoms and cough. Using albuterol inhaler 2 times per day.      See problem list for active medical problems.  Problems all longstanding and stable, except as noted/documented.  See ROS for pertinent symptoms related to these conditions.    MEDICAL HISTORY:                                                      Patient Active Problem List    Diagnosis Date Noted     Methadone use (H) 2017     Priority: Medium     Morbid obesity, unspecified obesity type (H) 2016     Priority: Medium     Osteoarthritis of cervical spine with myelopathy 2016     Priority: Medium     DISH (diffuse idiopathic skeletal hyperostosis) 2016     Priority: Medium     Wheelchair bound 08/15/2015     Priority: Medium     Personal history of methicillin resistant Staphylococcus aureus 2015     Priority: Medium     Neurogenic bladder 2015     Priority: Medium     Ventral hernia 2015     Priority: Medium     History of ulcer disease 2014     Priority: Medium     She notes from NSAIDs; nearly . Discussed a hospitalization at [place] for this.       Chronic narcotic dependence (H) 2014     Priority: Medium     Recurrent UTI (urinary tract infection) 10/24/2014     Priority: Medium     Controlled  substance agreement signed 10/24/2014 10/24/2014     Priority: Medium     She had a controlled substance agreement with [employer] [facility] until seen by pain clinic. Now followed by pain clinic; see chronic pain       Fracture of lower extremity 06/26/2013     Priority: Medium     ACP (advance care planning) 02/26/2013     Priority: Medium     Advance Care Planning:   Receipt of ACP document:  Received: Resuscitation Guidelines order which was witnessed and signed by provider on 7-22-09 and 3-18-10.  Document previously scanned on 7-27-09 and 3-19-10. Order reviewed and found to be valid.  Code Status  needs to be updated to reflect choices in most recent ACP document. Confirmed/documented designated decision maker(s). See permanent comments section of demographics in clinical tab. View document(s) and details by clicking on code status. Added by Jie Stewart RN System ACP Cordinator on 2/26/2013.             Hyperlipidemia LDL goal <100 05/13/2011     Priority: Medium     Neuropathy 02/21/2011     Priority: Medium     Asthma 02/15/2011     Priority: Medium     Suprapubic catheter (H) 02/11/2011     Priority: Medium     Lumbar spinal stenosis 02/11/2011     Priority: Medium     Fibromyalgia 02/11/2011     Priority: Medium     Osteoarthritis 02/11/2011     Priority: Medium     Hypertension goal BP (blood pressure) < 140/90 02/11/2011     Priority: Medium     Health Care Home 02/11/2011     Priority: Medium       Status:  No active care coordination   Care Coordinator:  Wendy Pearson [phone]  See Letters for [facility] Care Plan  Date:  May 23, 2016           Hypothyroidism due to acquired atrophy of thyroid 11/05/2010     Priority: Medium     Type 2 diabetes mellitus with diabetic nephropathy (H) 10/31/2010     Priority: Medium     Chronic low back pain 05/28/2009     Priority: Medium     1981       Moderate major depression (H) 05/28/2009     Priority: Medium     Anxiety 05/28/2009     Priority: Medium      Past  Medical History:   Diagnosis Date     Anxiety      Asthma      Depression      DM (diabetes mellitus) (H)      Frequent UTI      History of blood transfusion      History of ulcer disease 2014    She notes from NSAIDs; nearly . Discussed a hospitalization at [place] for this.     Hypertension      Hypothyroid      Iatrogenic Cushing's disease (H)     off prednisone now     Morbid obesity (H)      MRSA (methicillin resistant staph aureus) culture positive 2012    repeat cx 10/12/2012 no staph mentioned.     Osteoarthritis     multiple joings.     Other chronic pain      Sleep apnea     No longer uses cpap.     Past Surgical History:   Procedure Laterality Date     ABDOMEN SURGERY       C LAMINECTOMY,FACETECTOMY,LUMBAR  1982     C TOTAL KNEE ARTHROPLASTY      left     CYSTOSCOPY, INTRAVESICAL INJECTION N/A 2017    Procedure: CYSTOSCOPY, INTRAVESICAL INJECTION;  Cystoscopy, Botox Injection Into The Bladder  Latex Allergy ;  Surgeon: Yoandy Rios MD;  Location: [facility] OR     DISCECTOMY, FUSION CERVICAL ANTERIOR THREE+ LEVELS, COMBINED Left 5/3/2016    Procedure: COMBINED DISCECTOMY, FUSION CERVICAL ANTERIOR THREE+ LEVELS;  Surgeon: Jasvir Torres MD;  Location: [facility] OR     GENITOURINARY SURGERY      suprapubic catheter     HERNIA REPAIR      double hernia     HYSTERECTOMY, PAP NO LONGER INDICATED      CELIA and large ovarian tumor removed     SURGICAL HISTORY OF -       left cataract surgery     SURGICAL HISTORY OF -       right cataract surgery and left laser revision     SURGICAL HISTORY OF -   2015    left carpal tunnel release     TONSILLECTOMY  1974     Current Outpatient Prescriptions   Medication Sig Dispense Refill     fluconazole (DIFLUCAN) 100 MG tablet Patient should take 2 100 mg first day and 100 mg for the next 4 days 6 tablet 0     albuterol (2.5 MG/3ML) 0.083% neb solution Take 1 vial (2.5 mg) by nebulization every 4 hours as needed for shortness of  breath / dyspnea or wheezing 30 vial 1     order for DME Equipment being ordered: Nebulizer 1 each 0     levothyroxine (SYNTHROID/LEVOTHROID) 88 MCG tablet Take 1 tablet (88 mcg) by mouth daily 90 tablet 0     ondansetron (ZOFRAN ODT) 4 MG ODT tab Take 1-2 tablets (4-8 mg) by mouth every 8 hours as needed for nausea 20 tablet 1     metFORMIN (GLUCOPHAGE-XR) 500 MG 24 hr tablet Take 1 tablet (500 mg) by mouth 2 times daily (with meals) 360 tablet 0     lisinopril (PRINIVIL/ZESTRIL) 2.5 MG tablet Take 1 tablet (2.5 mg) by mouth daily 90 tablet 1     albuterol (PROAIR HFA/PROVENTIL HFA/VENTOLIN HFA) 108 (90 BASE) MCG/ACT Inhaler Inhale 2 puffs into the lungs every 6 hours as needed for shortness of breath / dyspnea or wheezing 1 Inhaler 11     gentamicin (GARAMYCIN) 40 MG/ML injection Gentamicin 80mg/2mL dosed as follows:IM injection of 160mg (4mL) on day one, IM injection of 80mg (2mL) on day two, IM injection of 80mg (2mL) on day three 8 mL 0     simvastatin (ZOCOR) 20 MG tablet Take 1 tablet (20 mg) by mouth At Bedtime 90 tablet 3     traZODone (DESYREL) 100 MG tablet TAKE 1 TABLET(100 MG) BY MOUTH AT BEDTIME 90 tablet 1     amoxicillin-clavulanate (AUGMENTIN) 875-125 MG per tablet Take 1 tablet by mouth 2 times daily 20 tablet 0     ciprofloxacin (CIPRO) 500 MG tablet Take 1 tablet (500 mg) by mouth 2 times daily 10 tablet 0     methadone (DOLOPHINE) 10 MG tablet Take 2 pills  tablet 0     diazepam (VALIUM) 5 MG tablet Take 1 tablet (5 mg) by mouth every 8 hours as needed for anxiety or other (pain, muscle spasm) 30 tablet 0     senna-docusate (SENOKOT-S;PERICOLACE) 8.6-50 MG per tablet Take 2 tablets by mouth 2 times daily 100 tablet      order for DME Equipment being ordered: BIPAP.   15/5, Rate of 12, 2L O2 to keep sats 90-95%. 1 each 0     gabapentin (NEURONTIN) 600 MG tablet 1,200 mg 3 times daily   2     nystatin (MYCOSTATIN) 353484 UNIT/GM POWD Apply topically 3 times daily as needed 30 g 1     order  "for DME Equipment being ordered: Nebulizer 1 Package 0     order for DME Equipment being ordered: Hospital Bed, total electric (head, foot, and height adjustments), with any type side rails, with mattress 1 each 0     order for DME Equipment being ordered: Motorized Wheelchair 1 each 0     order for DME Equipment being ordered: Trapeze bar for hospital bed 1 each 0     Phenazopyridine HCl (PYRIDIUM PO) Take 2 tablets by mouth 3 times daily as needed Dose unknown       blood glucose monitoring (ACCU-CHEK FASTCLIX) lancets Use to test blood sugar 1 times daily or as directed. 1 Box prn     blood glucose monitoring (ACCU-CHEK SP SMARTVIEW) meter device kit Use to test blood sugar 1 times daily or as directed. 1 kit 0     DULoxetine HCl (CYMBALTA PO) Take 120 mg by mouth daily        tiZANidine (ZANAFLEX) 4 MG tablet Take 1 tablet (4 mg) by mouth 3 times daily 90 tablet      aspirin 81 MG tablet Take by mouth daily 30 tablet      MIRALAX OR 1 capful daily prn       MULTIVITAMIN OR 1 a day       OTC products: None, except as noted above    Allergies   Allergen Reactions     Povidone Iodine      \"mild skin irritation\" per patient report     Toradol [Ketorolac] Other (See Comments)     Pt gets nightmares      Latex Allergy: NO    Social History   Substance Use Topics     Smoking status: Never Smoker     Smokeless tobacco: Never Used     Alcohol use No     History   Drug Use No     REVIEW OF SYSTEMS:                                                    Constitutional, HEENT, cardiovascular, pulmonary, GI, , musculoskeletal, neuro, skin, endocrine and psych systems are negative, except as in HPI or otherwise noted     This document serves as a record of the services and decisions personally performed and made by Susan Haase, CNP. It was created on her behalf by Jose Luis Islas, a trained medical scribe. The creation of this document is based the provider's statements to the medical scribe.  Jose Luis Islas February 15, 2018 2:50 " "PM      EXAM:                                                    /68 (BP Location: Right arm, Patient Position: Chair, Cuff Size: Adult Regular)  Pulse 72  Temp 98.4  F (36.9  C) (Oral)  Resp 14  Ht 5' 7\" (1.702 m)  Wt 251 lb (113.9 kg)  SpO2 98%  BMI 39.31 kg/m2    GENERAL APPEARANCE: healthy, alert and no distress, is wheelchair bound.     HENT: ear canals and TM's normal and nose and mouth without ulcers or lesions     NECK: no adenopathy, no asymmetry, masses, or scars and thyroid normal to palpation     RESP: lungs clear to auscultation - no rales, rhonchi or wheezes     CV: regular rates and rhythm, normal S1 S2, no S3 or S4 and no murmur     ABDOMEN:  soft, nontender, no HSM or masses and bowel sounds normal     MS: wheelchair bound.      SKIN: Dry erythematic skin outside left ear     NEURO: Normal strength and tone, sensory exam grossly normal, mentation intact and speech normal     PSYCH: mentation appears normal. and affect normal/bright     LYMPHATICS: Left axillary tender mobile nodule, 1 cm in size, tender to touch.      DIAGNOSTICS:                                                    EKG: completed 12/10/2017, no change from previous EKG.  HGB: 16.7  Urine culture: pending   A1C:  6.8%.    IMPRESSION:                                                    Reason for surgery/procedure: Bladder botox injections  Diagnosis/reason for consult: Preoperative anesthesia risk assessment    The proposed surgical procedure is considered LOW risk.    REVISED CARDIAC RISK INDEX  The patient has the following serious cardiovascular risks for perioperative complications such as (MI, PE, VFib and 3  AV Block):  No serious cardiac risks  INTERPRETATION: 0 risks: Class I (very low risk - 0.4% complication rate)    The patient has the following additional risks for perioperative complications:  The ASCVD Risk score (Christie DORY Jr, et al., 2013) failed to calculate for the following reasons:    The patient has a " prior MCI or stroke diagnosis      ICD-10-CM    1. Preop general physical exam Z01.818      RECOMMENDATIONS:                                                    Bhavani was seen today for pre-op exam and mass.    Diagnoses and all orders for this visit:    Preop general physical exam  -     CBC with platelets differential  -     Comprehensive metabolic panel  -     Urine Culture Aerobic Bacterial    Neurogenic bladder;  Urine culture pending.    Type 2 diabetes mellitus with diabetic nephropathy, without long-term current use of insulin (H): A1C stable at 6.8%.  Hold metformin the  Morning of surgery.  -     Comprehensive metabolic panel  -     Hemoglobin A1c    Mild intermittent asthma without complication:  Continue to use albuterol inhaler 2-3 times per day prn cough.        --Consult hospital rounder / IM to assist post-op medical management    --Patient is to take all scheduled medications on the day of surgery EXCEPT for modifications listed below.    Aspirin, stop 7-10 days prior to surgery, resume postoperatively.  Metformin;  Hold morning of surgery.  Lisinopril:  Hold 24hours prior to surgery.    Approval given to proceed with proposed procedure, without further diagnostic evaluation  Is aware of NPO orders and need to refrain from taking NSAIDS, Aspirin prior to surgery.     The information in this document, created by the medical scribe for me, accurately reflects the services I personally performed and the decisions made by me. I have reviewed and approved this document for accuracy.   Susan Haase, CNP     Signed Electronically by: Susan Haase, APRN CNP    Copy of this evaluation report is provided to requesting physician.    [facility] Preop Guidelines

## 2018-02-15 NOTE — MR AVS SNAPSHOT
After Visit Summary   2/15/2018    Bhavani White    MRN: [id number]           Patient Information     Date Of Birth          1955        Visit Information        Provider Department      2/15/2018 2:30 PM Haase, Susan Rachele, APRN [facility]        Today's Diagnoses     Preop general physical exam    -  1    Type 2 diabetes mellitus with diabetic nephropathy, without long-term current use of insulin (H)        Axillary lump, unspecified laterality        Dermatitis          Care Instructions      Before Your Surgery      Call your surgeon if there is any change in your health. This includes signs of a cold or flu (such as a sore throat, runny nose, cough, rash or fever).    Do not smoke, drink alcohol or take over the counter medicine (unless your surgeon or primary care doctor tells you to) for the 24 hours before and after surgery.    If you take prescribed drugs: Follow your doctor s orders about which medicines to take and which to stop until after surgery.    Eating and drinking prior to surgery: follow the instructions from your surgeon    Take a shower or bath the night before surgery. Use the soap your surgeon gave you to gently clean your skin. If you do not have soap from your surgeon, use your regular soap. Do not shave or scrub the surgery site.  Wear clean pajamas and have clean sheets on your bed.           Follow-ups after your visit        Your next 10 appointments already scheduled     Feb 22, 2018   Procedure with Yoandy Rios MD   [facility], [place], Same Day Surgery (--)    [address] [address]   [phone]              Future tests that were ordered for you today     Open Future Orders        Priority Expected Expires Ordered    MA Diagnostic Digital Bilateral Routine  2/15/2019 2/15/2018            Who to contact     If you have questions or need follow up information about today's clinic visit or your schedule please contact  "[facility] directly at [phone].  Normal or non-critical lab and imaging results will be communicated to you by MyChart, letter or phone within 4 business days after the clinic has received the results. If you do not hear from us within 7 days, please contact the clinic through [employer] or phone. If you have a critical or abnormal lab result, we will notify you by phone as soon as possible.  Submit refill requests through [employer] or call your pharmacy and they will forward the refill request to us. Please allow 3 business days for your refill to be completed.          Additional Information About Your Visit        [employer] Information     [employer] gives you secure access to your electronic health record. If you see a primary care provider, you can also send messages to your care team and make appointments. If you have questions, please call your primary care clinic.  If you do not have a primary care provider, please call [phone] and they will assist you.        Care EveryWhere ID     This is your Care EveryWhere ID. This could be used by other organizations to access your [place] medical records  [id number]        Your Vitals Were     Pulse Temperature Respirations Height Pulse Oximetry BMI (Body Mass Index)    72 98.4  F (36.9  C) (Oral) 14 5' 7\" (1.702 m) 98% 39.31 kg/m2       Blood Pressure from Last 3 Encounters:   02/15/18 132/68   12/14/17 116/60   12/10/17 130/75    Weight from Last 3 Encounters:   02/15/18 251 lb (113.9 kg)   12/14/17 277 lb (125.6 kg)   10/27/17 277 lb (125.6 kg)              We Performed the Following     CBC with platelets differential     Comprehensive metabolic panel     Hemoglobin A1c     Urine Culture Aerobic Bacterial          Today's Medication Changes          These changes are accurate as of 2/15/18  3:26 PM.  If you have any questions, ask your nurse or doctor.               Start taking these medicines.        Dose/Directions    desonide 0.05 % " ointment   Commonly known as:  DESOWEN   Used for:  Dermatitis   Started by:  Haase, Susan Rachele, APRN CNP        Apply sparingly to affected area three times daily for 14 days.   Quantity:  15 g   Refills:  1            Where to get your medicines      These medications were sent to [id number] [address] AT [place] [address]  [address] [facility] [address]    Hours:  24-hours Phone:  [phone]     desonide 0.05 % ointment                Primary Care Provider Office Phone # Fax #    Shawnee Dueñas,  [phone] [address]  [facility] [address]        Equal Access to Services     JOSE LUIS GOMEZ : Hadii aad ku hadasho Soomaali, waaxda luqadaha, qaybta kaalmada adeegyada, waxcandi gilbert. So [facility] [phone].    ATENCIÓN: Si habla español, tiene a shay disposición servicios gratuitos de asistencia lingüística. [facility] [phone].    We comply with applicable federal civil rights laws and [place] laws. We do not discriminate on the basis of race, color, national origin, age, disability, sex, sexual orientation, or gender identity.            Thank you!     Thank you for choosing [facility]  for your care. Our goal is always to provide you with excellent care. Hearing back from our patients is one way we can continue to improve our services. Please take a few minutes to complete the written survey that you may receive in the mail after your visit with us. Thank you!             Your Updated Medication List - Protect others around you: Learn how to safely use, store and throw away your medicines at www.disposemymeds.org.          This list is accurate as of 2/15/18  3:26 PM.  Always use your most recent med list.                   Brand Name Dispense Instructions for use Diagnosis    * albuterol 108 (90 BASE) MCG/ACT Inhaler    PROAIR HFA/PROVENTIL HFA/VENTOLIN HFA    1  Inhaler    Inhale 2 puffs into the lungs every 6 hours as needed for shortness of breath / dyspnea or wheezing    Moderate persistent asthma with acute exacerbation       * albuterol (2.5 MG/3ML) 0.083% neb solution     30 vial    Take 1 vial (2.5 mg) by nebulization every 4 hours as needed for shortness of breath / dyspnea or wheezing    Moderate persistent asthma with acute exacerbation       aspirin 81 MG tablet     30 tablet    Take by mouth daily    Type 2 diabetes, HbA1c goal < 7% (H)       blood glucose monitoring lancets     1 Box    Use to test blood sugar 1 times daily or as directed.    Type 2 diabetes mellitus with diabetic nephropathy (H)       blood glucose monitoring meter device kit     1 kit    Use to test blood sugar 1 times daily or as directed.    Type 2 diabetes mellitus with diabetic nephropathy (H)       CYMBALTA PO      Take 120 mg by mouth daily        desonide 0.05 % ointment    DESOWEN    15 g    Apply sparingly to affected area three times daily for 14 days.    Dermatitis       gabapentin 600 MG tablet    NEURONTIN     1,200 mg 3 times daily        levothyroxine 88 MCG tablet    SYNTHROID/LEVOTHROID    90 tablet    Take 1 tablet (88 mcg) by mouth daily    Hypothyroidism due to acquired atrophy of thyroid       lisinopril 2.5 MG tablet    PRINIVIL/Zestril    90 tablet    Take 1 tablet (2.5 mg) by mouth daily    Hypertension goal BP (blood pressure) < 140/90       metFORMIN 500 MG 24 hr tablet    GLUCOPHAGE-XR    360 tablet    Take 1 tablet (500 mg) by mouth 2 times daily (with meals)    Type 2 diabetes mellitus with diabetic nephropathy, unspecified long term insulin use status (H)       methadone 10 MG tablet    DOLOPHINE    150 tablet    Take 2 pills TID    Acute respiratory failure with hypoxia and hypercapnia (H)       MIRALAX PO      1 capful daily prn        MULTIVITAMIN PO      1 a day        nystatin 525163 UNIT/GM Powd    MYCOSTATIN    30 g    Apply topically 3 times daily as  needed    Candidiasis of skin       ondansetron 4 MG ODT tab    ZOFRAN ODT    20 tablet    Take 1-2 tablets (4-8 mg) by mouth every 8 hours as needed for nausea    Nausea and vomiting, intractability of vomiting not specified, unspecified vomiting type       * order for DME     1 each    Equipment being ordered: Hospital Bed, total electric (head, foot, and height adjustments), with any type side rails, with mattress    Wheelchair bound, Ankylosing spondylitis (H), Spinal stenosis in cervical region, Lumbar spinal stenosis, Fibromyalgia, Chronic low back pain       * order for DME     1 each    Equipment being ordered: Motorized Wheelchair    Wheelchair bound, Ankylosing spondylitis (H), Spinal stenosis in cervical region, Lumbar spinal stenosis, Fibromyalgia, Chronic low back pain       * order for DME     1 each    Equipment being ordered: Trapeze bar for hospital bed    Wheelchair bound, Ankylosing spondylitis (H), Spinal stenosis in cervical region, Lumbar spinal stenosis, Fibromyalgia, Chronic low back pain       * order for DME     1 Package    Equipment being ordered: Nebulizer    Moderate persistent asthma with acute exacerbation       * order for DME     1 each    Equipment being ordered: BIPAP.  15/5, Rate of 12, 2L O2 to keep sats 90-95%.    Acute respiratory failure with hypoxia and hypercapnia (H)       * order for DME     1 each    Equipment being ordered: Nebulizer    Moderate persistent asthma with acute exacerbation       PYRIDIUM PO      Take 2 tablets by mouth 3 times daily as needed Dose unknown        senna-docusate 8.6-50 MG per tablet    SENOKOT-S;PERICOLACE    100 tablet    Take 2 tablets by mouth 2 times daily    Acute respiratory failure with hypoxia and hypercapnia (H)       simvastatin 20 MG tablet    ZOCOR    90 tablet    Take 1 tablet (20 mg) by mouth At Bedtime    Hyperlipidemia LDL goal <100       tiZANidine 4 MG tablet    ZANAFLEX    90 tablet    Take 1 tablet (4 mg) by mouth 3 times  daily        traZODone 100 MG tablet    DESYREL    90 tablet    TAKE 1 TABLET(100 MG) BY MOUTH AT BEDTIME    Insomnia       * Notice:  This list has 8 medication(s) that are the same as other medications prescribed for you. Read the directions carefully, and ask your doctor or other care provider to review them with you.

## 2018-02-16 LAB
ALBUMIN SERPL-MCNC: 3.6 G/DL (ref 3.4–5)
ALP SERPL-CCNC: 115 U/L (ref 40–150)
ALT SERPL W P-5'-P-CCNC: 47 U/L (ref 0–50)
ANION GAP SERPL CALCULATED.3IONS-SCNC: 4 MMOL/L (ref 3–14)
AST SERPL W P-5'-P-CCNC: 44 U/L (ref 0–45)
BILIRUB SERPL-MCNC: 0.3 MG/DL (ref 0.2–1.3)
BUN SERPL-MCNC: 18 MG/DL (ref 7–30)
CALCIUM SERPL-MCNC: 10.3 MG/DL (ref 8.5–10.1)
CHLORIDE SERPL-SCNC: 96 MMOL/L (ref 94–109)
CO2 SERPL-SCNC: 33 MMOL/L (ref 20–32)
CREAT SERPL-MCNC: 0.75 MG/DL (ref 0.52–1.04)
GFR SERPL CREATININE-BSD FRML MDRD: 78 ML/MIN/1.7M2
GLUCOSE SERPL-MCNC: 189 MG/DL (ref 70–99)
POTASSIUM SERPL-SCNC: 4.7 MMOL/L (ref 3.4–5.3)
PROT SERPL-MCNC: 8.2 G/DL (ref 6.8–8.8)
SODIUM SERPL-SCNC: 133 MMOL/L (ref 133–144)

## 2018-02-16 ASSESSMENT — ASTHMA QUESTIONNAIRES: ACT_TOTALSCORE: 15

## 2018-02-16 NOTE — PROGRESS NOTES
Rashad Beckham,  Your A1C (test for diabetes) is stable at 6.8%.  Your CBC (checks for anemia and infection) was also normal.  Sincerely,     Susan Haase, CNP

## 2018-02-17 LAB
BACTERIA SPEC CULT: ABNORMAL
BACTERIA SPEC CULT: ABNORMAL
SPECIMEN SOURCE: ABNORMAL

## 2018-02-19 ENCOUNTER — CARE COORDINATION (OUTPATIENT)
Dept: UROLOGY | Facility: CLINIC | Age: 63
End: 2018-02-19

## 2018-02-19 DIAGNOSIS — R30.0 DYSURIA: Primary | ICD-10-CM

## 2018-02-19 RX ORDER — NITROFURANTOIN 25; 75 MG/1; MG/1
100 CAPSULE ORAL 2 TIMES DAILY
Qty: 6 CAPSULE | Refills: 0 | Status: SHIPPED | OUTPATIENT
Start: 2018-02-19 | End: 2018-02-22

## 2018-02-19 NOTE — PROGRESS NOTES
Upcoming surgical procedure with Dr Yoandy Rios on 2/22/18 at 1200, check in at 1000.   [facility]gery at [facility] [place]  Patient is having a Cystoscopy and Botox injection into the bladder   Patient has a responsible adult to drive home and stay with them for 24 hours: Yes  Pre-op physical completed: YES. Date-2/15/18.  PAC: No  Bowel Prep: No  Urine culture completed: YES. Date-2/15/18.  Positive culture.  Treated with Macrobid BID x 3 days per protocol.  Post-operative appointment needed: No  All questions answered.    Patient verbalized understanding. No further questions.      Jaki Dimas, THANG  Care Coordinator - Reconstructive Urology  [phone]

## 2018-02-21 ENCOUNTER — ANESTHESIA EVENT (OUTPATIENT)
Dept: SURGERY | Facility: CLINIC | Age: 63
End: 2018-02-21

## 2018-02-21 ENCOUNTER — HOSPITAL ENCOUNTER (OUTPATIENT)
Dept: ULTRASOUND IMAGING | Facility: CLINIC | Age: 63
End: 2018-02-21
Attending: NURSE PRACTITIONER
Payer: MEDICARE

## 2018-02-21 DIAGNOSIS — R22.30: ICD-10-CM

## 2018-02-21 DIAGNOSIS — R59.0 LOCALIZED ENLARGED LYMPH NODES: ICD-10-CM

## 2018-02-21 PROCEDURE — 76882 US LMTD JT/FCL EVL NVASC XTR: CPT | Mod: LT

## 2018-02-21 NOTE — ANESTHESIA PREPROCEDURE EVALUATION
Anesthesia Evaluation     . Pt has had prior anesthetic.     No history of anesthetic complications          ROS/MED HX    ENT/Pulmonary:     (+)sleep apnea, Intermittent asthma Treatment: Inhaler prn and Nebulizer prn,  uses CPAP , . .    Neurologic:  - neg neurologic ROS     Cardiovascular:     (+) hypertension----. : . . . :. . Previous cardiac testing Echodate:results:4/2016: Interpretation Summary  REST ECG-NSR with mixed IVCD: iLBBB, RBBB  Left ventricular hypertrophy visualized by 2-D imaging.  REST Echo: EF >65%, prox septal thickening/LVH. No LV outflow obstruction at  rest. LIMITED IMAGE QUALITY THROUGHOUT STUDY DUE TO PATIENT BODY HABITUS.  THIS WILL REDUCE SENSITIVITY OF TEST FOR DIAGNOSIS OF CAD  Patient noted chest pain with dobutamine, but there were no ST segment  changes and the echo pictures show hyperdynamic LV with cavity obliteration,  EF>70% and no regional wall motion abnormalities. No ischemia identified on  this test. (limited image quality due to patient body habitus)date: results:ECG reviewed date: results:6/2017: SR, RBBB date: results:          METS/Exercise Tolerance:     Hematologic:     (+) Anemia, History of Transfusion -      Musculoskeletal:   (+) arthritis, , , -       GI/Hepatic:     (+) Other GI/Hepatic PUD     (-) GERD   Renal/Genitourinary:  - ROS Renal section negative       Endo: Comment: Iatrogenic cushings - not on steroids      (+) type II DM Last HgA1c: 6.8 date: 2/2018 Not using insulin - not using insulin pump not previously admitted for DM/DKA thyroid problem hypothyroidism, Obesity, .      Psychiatric:     (+) psychiatric history anxiety and depression      Infectious Disease:   (+) MRSA, Other Infectious Disease frequent UTI      Malignancy:      - no malignancy   Other:    (+) No chance of pregnancy C-spine cleared: N/A, H/O Chronic Pain,H/O chronic opiod use , other significant disability Wheelchair bound                                  Anesthesia  Plan      History & Physical Review      ASA Status:  3 .        Plan for MAC with Intravenous and Propofol induction. Maintenance will be TIVA.  Reason for MAC:  Difficulty with conscious sedation (QS) and Deep or markedly invasive procedure (G8)    Additional equipment: Videolaryngoscope      Postoperative Care  Postoperative pain management:  IV analgesics and Oral pain medications.      Consents        ANESTHESIA PREOP EVALUATION    Procedure: Procedure(s):  Cystoscopy, Botox Injection Into The Bladder  - Wound Class:     HPI: Bhavani White is a 62 year old female presenting for above procedure as a MAC sedation given h/o neurogenic bladder in a pt with chronic pain with profound JEOVANNY who is also WC-bound.    PMHx/PSHx/ROS:  Past Medical History:   Diagnosis Date     Anxiety      Asthma      Depression      DM (diabetes mellitus) (H)      Frequent UTI      History of blood transfusion      History of ulcer disease 2014    She notes from NSAIDs; nearly . Discussed a hospitalization at [facility] for this.     Hypertension      Hypothyroid      Iatrogenic Cushing's disease (H)     off prednisone now     Morbid obesity (H)      MRSA (methicillin resistant staph aureus) culture positive 2012    repeat cx 10/12/2012 no staph mentioned.     Osteoarthritis     multiple joings.     Other chronic pain      Sleep apnea     No longer uses cpap.       Past Surgical History:   Procedure Laterality Date     ABDOMEN SURGERY       C LAMINECTOMY,FACETECTOMY,LUMBAR  1982     C TOTAL KNEE ARTHROPLASTY      left     CYSTOSCOPY, INTRAVESICAL INJECTION N/A 2017    Procedure: CYSTOSCOPY, INTRAVESICAL INJECTION;  Cystoscopy, Botox Injection Into The Bladder  Latex Allergy ;  Surgeon: Yoandy Rios MD;  Location: [facility] OR     DISCECTOMY, FUSION CERVICAL ANTERIOR THREE+ LEVELS, COMBINED Left 5/3/2016    Procedure: COMBINED DISCECTOMY, FUSION CERVICAL ANTERIOR THREE+ LEVELS;  Surgeon: Jasvir Torres,  "MD;  Location: [facility] OR     GENITOURINARY SURGERY      suprapubic catheter     HERNIA REPAIR  1957    double hernia     HYSTERECTOMY, PAP NO LONGER INDICATED      CELIA and large ovarian tumor removed     SURGICAL HISTORY OF -       left cataract surgery     SURGICAL HISTORY OF -   12/14    right cataract surgery and left laser revision     SURGICAL HISTORY OF -   March 2015    left carpal tunnel release     TONSILLECTOMY  1974         Past Anes Hx: No personal or family h/o anesthesia problems    Soc Hx:   Social History   Substance Use Topics     Smoking status: Never Smoker     Smokeless tobacco: Never Used     Alcohol use No       Allergies:   Allergies   Allergen Reactions     Povidone Iodine      \"mild skin irritation\" per patient report     Toradol [Ketorolac] Other (See Comments)     Pt gets nightmares       Meds:   No prescriptions prior to admission.       Current Outpatient Prescriptions   Medication Sig Dispense Refill     nitroFURantoin, macrocrystal-monohydrate, (MACROBID) 100 MG capsule Take 1 capsule (100 mg) by mouth 2 times daily for 3 days 6 capsule 0     desonide (DESOWEN) 0.05 % ointment Apply sparingly to affected area three times daily for 14 days. 15 g 1     albuterol (2.5 MG/3ML) 0.083% neb solution Take 1 vial (2.5 mg) by nebulization every 4 hours as needed for shortness of breath / dyspnea or wheezing 30 vial 1     order for DME Equipment being ordered: Nebulizer 1 each 0     levothyroxine (SYNTHROID/LEVOTHROID) 88 MCG tablet Take 1 tablet (88 mcg) by mouth daily 90 tablet 0     ondansetron (ZOFRAN ODT) 4 MG ODT tab Take 1-2 tablets (4-8 mg) by mouth every 8 hours as needed for nausea 20 tablet 1     metFORMIN (GLUCOPHAGE-XR) 500 MG 24 hr tablet Take 1 tablet (500 mg) by mouth 2 times daily (with meals) 360 tablet 0     lisinopril (PRINIVIL/ZESTRIL) 2.5 MG tablet Take 1 tablet (2.5 mg) by mouth daily 90 tablet 1     albuterol (PROAIR HFA/PROVENTIL HFA/VENTOLIN HFA) 108 (90 BASE) MCG/ACT " Inhaler Inhale 2 puffs into the lungs every 6 hours as needed for shortness of breath / dyspnea or wheezing 1 Inhaler 11     simvastatin (ZOCOR) 20 MG tablet Take 1 tablet (20 mg) by mouth At Bedtime 90 tablet 3     traZODone (DESYREL) 100 MG tablet TAKE 1 TABLET(100 MG) BY MOUTH AT BEDTIME 90 tablet 1     methadone (DOLOPHINE) 10 MG tablet Take 2 pills  tablet 0     senna-docusate (SENOKOT-S;PERICOLACE) 8.6-50 MG per tablet Take 2 tablets by mouth 2 times daily 100 tablet      order for DME Equipment being ordered: BIPAP.   15/5, Rate of 12, 2L O2 to keep sats 90-95%. 1 each 0     gabapentin (NEURONTIN) 600 MG tablet 1,200 mg 3 times daily   2     nystatin (MYCOSTATIN) 322832 UNIT/GM POWD Apply topically 3 times daily as needed 30 g 1     order for DME Equipment being ordered: Nebulizer 1 Package 0     order for DME Equipment being ordered: Hospital Bed, total electric (head, foot, and height adjustments), with any type side rails, with mattress 1 each 0     order for DME Equipment being ordered: Motorized Wheelchair 1 each 0     order for DME Equipment being ordered: Trapeze bar for hospital bed 1 each 0     Phenazopyridine HCl (PYRIDIUM PO) Take 2 tablets by mouth 3 times daily as needed Dose unknown       blood glucose monitoring (ACCU-CHEK FASTCLIX) lancets Use to test blood sugar 1 times daily or as directed. 1 Box prn     blood glucose monitoring (ACCU-CHEK SP SMARTVIEW) meter device kit Use to test blood sugar 1 times daily or as directed. 1 kit 0     DULoxetine HCl (CYMBALTA PO) Take 120 mg by mouth daily        tiZANidine (ZANAFLEX) 4 MG tablet Take 1 tablet (4 mg) by mouth 3 times daily 90 tablet      aspirin 81 MG tablet Take by mouth daily 30 tablet      MIRALAX OR 1 capful daily prn       MULTIVITAMIN OR 1 a day         Physical Exam:  Vitals: There were no vitals taken for this visit.  BMI= There is no height or weight on file to calculate BMI.      Labs:  UPT: No results found for:  HCGQUANT      BMP:  Recent Labs   Lab Test  02/15/18   1504   NA  133   POTASSIUM  4.7   CHLORIDE  96   CO2  33*   BUN  18   CR  0.75   GLC  189*   DORY  10.3*     CBC:   Recent Labs   Lab Test  02/15/18   1504   WBC  10.0   RBC  6.11*   HGB  16.7*   HCT  51.3*   MCV  84   MCH  27.3   MCHC  32.6   RDW  15.0   PLT  244     Coags:  Recent Labs   Lab Test  07/26/17   1532  04/18/16   1528   INR  0.97  1.00   PTT   --   25       Assessment/Plan:  - ASA 3  - MAC sedation with standard ASA monitors, IV induction, balanced anesthetic  - CMAC in room given h/o C-spine fusion and ANIYA.  - PIVx1  - Antibiotics per surgery  - Blood products available, possible administration discussed with patient  - Relevant risks, benefits, alternatives and the anesthetic plan were discussed with patient/family or family representative.  All questions were answered and there was agreement to proceed.      Marquis Rodriguez DO, MSc. (CA-1)    2/21/2018.  2:55 PM

## 2018-02-22 ENCOUNTER — ANESTHESIA (OUTPATIENT)
Dept: SURGERY | Facility: CLINIC | Age: 63
End: 2018-02-22

## 2018-02-27 ENCOUNTER — TELEPHONE (OUTPATIENT)
Dept: UROLOGY | Facility: CLINIC | Age: 63
End: 2018-02-27

## 2018-02-27 NOTE — TELEPHONE ENCOUNTER
Received an inbasket that the patient would like to reschedule her surgery with . Called and left a Vm regarding R/S and left our direct number to call back.

## 2018-03-02 DIAGNOSIS — R30.0 DYSURIA: ICD-10-CM

## 2018-03-02 DIAGNOSIS — R30.0 DYSURIA: Primary | ICD-10-CM

## 2018-03-02 PROCEDURE — 87086 URINE CULTURE/COLONY COUNT: CPT | Performed by: FAMILY MEDICINE

## 2018-03-02 PROCEDURE — 87088 URINE BACTERIA CULTURE: CPT | Performed by: FAMILY MEDICINE

## 2018-03-02 PROCEDURE — 87186 SC STD MICRODIL/AGAR DIL: CPT | Performed by: FAMILY MEDICINE

## 2018-03-05 ENCOUNTER — CARE COORDINATION (OUTPATIENT)
Dept: UROLOGY | Facility: CLINIC | Age: 63
End: 2018-03-05

## 2018-03-05 DIAGNOSIS — R30.0 DYSURIA: Primary | ICD-10-CM

## 2018-03-05 LAB
BACTERIA SPEC CULT: ABNORMAL
BACTERIA SPEC CULT: ABNORMAL
SPECIMEN SOURCE: ABNORMAL

## 2018-03-05 RX ORDER — NITROFURANTOIN 25; 75 MG/1; MG/1
100 CAPSULE ORAL 2 TIMES DAILY
Qty: 6 CAPSULE | Refills: 0 | Status: ON HOLD | OUTPATIENT
Start: 2018-03-05 | End: 2018-03-08

## 2018-03-05 NOTE — PROGRESS NOTES
Repeat UC is positive, patient is having severe bladder spasms and is incontinent.  Per Dr. Rios put patient on Macrobid 100 mg BID x 3 days.    Jaki Dimas RN  Care Coordinator- Reconstructive Urology

## 2018-03-08 ENCOUNTER — ANESTHESIA EVENT (OUTPATIENT)
Dept: SURGERY | Facility: CLINIC | Age: 63
End: 2018-03-08
Payer: MEDICARE

## 2018-03-08 ENCOUNTER — ANESTHESIA (OUTPATIENT)
Dept: SURGERY | Facility: CLINIC | Age: 63
End: 2018-03-08
Payer: MEDICARE

## 2018-03-08 ENCOUNTER — HOSPITAL ENCOUNTER (OUTPATIENT)
Facility: CLINIC | Age: 63
Discharge: HOME | End: 2018-03-08
Attending: UROLOGY | Admitting: UROLOGY
Payer: MEDICARE

## 2018-03-08 VITALS
DIASTOLIC BLOOD PRESSURE: 55 MMHG | SYSTOLIC BLOOD PRESSURE: 68 MMHG | TEMPERATURE: 98.1 F | BODY MASS INDEX: 39 KG/M2 | WEIGHT: 248.46 LBS | HEIGHT: 67 IN | OXYGEN SATURATION: 97 % | RESPIRATION RATE: 16 BRPM

## 2018-03-08 DIAGNOSIS — R30.0 DYSURIA: ICD-10-CM

## 2018-03-08 DIAGNOSIS — N31.9 NEUROGENIC BLADDER: Primary | ICD-10-CM

## 2018-03-08 LAB
GLUCOSE BLDC GLUCOMTR-MCNC: 108 MG/DL (ref 70–99)
GLUCOSE BLDC GLUCOMTR-MCNC: 116 MG/DL (ref 70–99)

## 2018-03-08 PROCEDURE — 37000008 ZZH ANESTHESIA TECHNICAL FEE, 1ST 30 MIN: Performed by: UROLOGY

## 2018-03-08 PROCEDURE — 82962 GLUCOSE BLOOD TEST: CPT | Mod: 91

## 2018-03-08 PROCEDURE — 40000171 ZZH STATISTIC PRE-PROCEDURE ASSESSMENT III: Performed by: UROLOGY

## 2018-03-08 PROCEDURE — 25000128 H RX IP 250 OP 636: Performed by: NURSE ANESTHETIST, CERTIFIED REGISTERED

## 2018-03-08 PROCEDURE — 25000128 H RX IP 250 OP 636: Performed by: UROLOGY

## 2018-03-08 PROCEDURE — 71000027 ZZH RECOVERY PHASE 2 EACH 15 MINS: Performed by: UROLOGY

## 2018-03-08 PROCEDURE — 37000009 ZZH ANESTHESIA TECHNICAL FEE, EACH ADDTL 15 MIN: Performed by: UROLOGY

## 2018-03-08 PROCEDURE — 25000125 ZZHC RX 250: Performed by: NURSE ANESTHETIST, CERTIFIED REGISTERED

## 2018-03-08 PROCEDURE — 36000053 ZZH SURGERY LEVEL 2 EA 15 ADDTL MIN - UMMC: Performed by: UROLOGY

## 2018-03-08 PROCEDURE — 27210794 ZZH OR GENERAL SUPPLY STERILE: Performed by: UROLOGY

## 2018-03-08 PROCEDURE — 25000576 ZZH RX IP 250 OP 636 J0585: Performed by: UROLOGY

## 2018-03-08 PROCEDURE — 36000051 ZZH SURGERY LEVEL 2 1ST 30 MIN - UMMC: Performed by: UROLOGY

## 2018-03-08 RX ORDER — MEPERIDINE HYDROCHLORIDE 25 MG/ML
12.5 INJECTION INTRAMUSCULAR; INTRAVENOUS; SUBCUTANEOUS
Status: DISCONTINUED | OUTPATIENT
Start: 2018-03-08 | End: 2018-03-08 | Stop reason: HOSPADM

## 2018-03-08 RX ORDER — DEXAMETHASONE SODIUM PHOSPHATE 4 MG/ML
4 INJECTION, SOLUTION INTRA-ARTICULAR; INTRALESIONAL; INTRAMUSCULAR; INTRAVENOUS; SOFT TISSUE EVERY 10 MIN PRN
Status: DISCONTINUED | OUTPATIENT
Start: 2018-03-08 | End: 2018-03-08 | Stop reason: HOSPADM

## 2018-03-08 RX ORDER — PROPOFOL 10 MG/ML
INJECTION, EMULSION INTRAVENOUS CONTINUOUS PRN
Status: DISCONTINUED | OUTPATIENT
Start: 2018-03-08 | End: 2018-03-08

## 2018-03-08 RX ORDER — AMOXICILLIN 250 MG
2 CAPSULE ORAL 2 TIMES DAILY
Qty: 30 TABLET | Refills: 0 | Status: SHIPPED | OUTPATIENT
Start: 2018-03-08 | End: 2019-12-29

## 2018-03-08 RX ORDER — ACETAMINOPHEN 325 MG/1
325-650 TABLET ORAL EVERY 6 HOURS PRN
Qty: 25 TABLET | Refills: 0 | Status: SHIPPED | OUTPATIENT
Start: 2018-03-08 | End: 2019-03-11

## 2018-03-08 RX ORDER — SODIUM CHLORIDE, SODIUM LACTATE, POTASSIUM CHLORIDE, CALCIUM CHLORIDE 600; 310; 30; 20 MG/100ML; MG/100ML; MG/100ML; MG/100ML
INJECTION, SOLUTION INTRAVENOUS CONTINUOUS
Status: DISCONTINUED | OUTPATIENT
Start: 2018-03-08 | End: 2018-03-08 | Stop reason: HOSPADM

## 2018-03-08 RX ORDER — FENTANYL CITRATE 50 UG/ML
25-50 INJECTION, SOLUTION INTRAMUSCULAR; INTRAVENOUS EVERY 5 MIN PRN
Status: DISCONTINUED | OUTPATIENT
Start: 2018-03-08 | End: 2018-03-08 | Stop reason: HOSPADM

## 2018-03-08 RX ORDER — KETAMINE HYDROCHLORIDE 10 MG/ML
INJECTION INTRAMUSCULAR; INTRAVENOUS PRN
Status: DISCONTINUED | OUTPATIENT
Start: 2018-03-08 | End: 2018-03-08

## 2018-03-08 RX ORDER — AMPICILLIN 2 G/1
2 INJECTION, POWDER, FOR SOLUTION INTRAVENOUS
Status: COMPLETED | OUTPATIENT
Start: 2018-03-08 | End: 2018-03-08

## 2018-03-08 RX ORDER — HYDROMORPHONE HYDROCHLORIDE 1 MG/ML
.3-.5 INJECTION, SOLUTION INTRAMUSCULAR; INTRAVENOUS; SUBCUTANEOUS EVERY 10 MIN PRN
Status: DISCONTINUED | OUTPATIENT
Start: 2018-03-08 | End: 2018-03-08 | Stop reason: HOSPADM

## 2018-03-08 RX ORDER — ONDANSETRON 2 MG/ML
4 INJECTION INTRAMUSCULAR; INTRAVENOUS EVERY 30 MIN PRN
Status: DISCONTINUED | OUTPATIENT
Start: 2018-03-08 | End: 2018-03-08 | Stop reason: HOSPADM

## 2018-03-08 RX ORDER — NALOXONE HYDROCHLORIDE 0.4 MG/ML
.1-.4 INJECTION, SOLUTION INTRAMUSCULAR; INTRAVENOUS; SUBCUTANEOUS
Status: DISCONTINUED | OUTPATIENT
Start: 2018-03-08 | End: 2018-03-08 | Stop reason: HOSPADM

## 2018-03-08 RX ORDER — HYDRALAZINE HYDROCHLORIDE 20 MG/ML
2.5-5 INJECTION INTRAMUSCULAR; INTRAVENOUS EVERY 10 MIN PRN
Status: DISCONTINUED | OUTPATIENT
Start: 2018-03-08 | End: 2018-03-08 | Stop reason: HOSPADM

## 2018-03-08 RX ORDER — LIDOCAINE HYDROCHLORIDE 20 MG/ML
INJECTION, SOLUTION INFILTRATION; PERINEURAL PRN
Status: DISCONTINUED | OUTPATIENT
Start: 2018-03-08 | End: 2018-03-08

## 2018-03-08 RX ORDER — NITROFURANTOIN 25; 75 MG/1; MG/1
100 CAPSULE ORAL 2 TIMES DAILY
Qty: 6 CAPSULE | Refills: 0 | Status: SHIPPED | OUTPATIENT
Start: 2018-03-08 | End: 2018-03-11

## 2018-03-08 RX ORDER — FENTANYL CITRATE 50 UG/ML
25-50 INJECTION, SOLUTION INTRAMUSCULAR; INTRAVENOUS
Status: DISCONTINUED | OUTPATIENT
Start: 2018-03-08 | End: 2018-03-08 | Stop reason: HOSPADM

## 2018-03-08 RX ORDER — ONDANSETRON 4 MG/1
4 TABLET, ORALLY DISINTEGRATING ORAL EVERY 30 MIN PRN
Status: DISCONTINUED | OUTPATIENT
Start: 2018-03-08 | End: 2018-03-08 | Stop reason: HOSPADM

## 2018-03-08 RX ORDER — SODIUM CHLORIDE, SODIUM LACTATE, POTASSIUM CHLORIDE, CALCIUM CHLORIDE 600; 310; 30; 20 MG/100ML; MG/100ML; MG/100ML; MG/100ML
INJECTION, SOLUTION INTRAVENOUS CONTINUOUS PRN
Status: DISCONTINUED | OUTPATIENT
Start: 2018-03-08 | End: 2018-03-08

## 2018-03-08 RX ADMIN — MIDAZOLAM 2 MG: 1 INJECTION INTRAMUSCULAR; INTRAVENOUS at 10:41

## 2018-03-08 RX ADMIN — KETAMINE HYDROCHLORIDE 10 MG: 10 INJECTION, SOLUTION INTRAMUSCULAR; INTRAVENOUS at 11:01

## 2018-03-08 RX ADMIN — LIDOCAINE HYDROCHLORIDE 100 MG: 20 INJECTION, SOLUTION INFILTRATION; PERINEURAL at 10:56

## 2018-03-08 RX ADMIN — SODIUM CHLORIDE, POTASSIUM CHLORIDE, SODIUM LACTATE AND CALCIUM CHLORIDE: 600; 310; 30; 20 INJECTION, SOLUTION INTRAVENOUS at 10:41

## 2018-03-08 RX ADMIN — GENTAMICIN SULFATE 240 MG: 40 INJECTION, SOLUTION INTRAMUSCULAR; INTRAVENOUS at 11:05

## 2018-03-08 RX ADMIN — AMPICILLIN SODIUM 2 G: 2 INJECTION, POWDER, FOR SOLUTION INTRAMUSCULAR; INTRAVENOUS at 12:06

## 2018-03-08 RX ADMIN — PROPOFOL 100 MCG/KG/MIN: 10 INJECTION, EMULSION INTRAVENOUS at 10:56

## 2018-03-08 NOTE — ANESTHESIA PREPROCEDURE EVALUATION
Anesthesia Evaluation     . Pt has had prior anesthetic.     No history of anesthetic complications          ROS/MED HX    ENT/Pulmonary:     (+)sleep apnea, Intermittent asthma Treatment: Inhaler prn and Nebulizer prn,  uses CPAP , . .    Neurologic:  - neg neurologic ROS     Cardiovascular:     (+) hypertension----. : . . . :. . Previous cardiac testing Echodate:results:4/2016: Interpretation Summary  REST ECG-NSR with mixed IVCD: iLBBB, RBBB  Left ventricular hypertrophy visualized by 2-D imaging.  REST Echo: EF >65%, prox septal thickening/LVH. No LV outflow obstruction at  rest. LIMITED IMAGE QUALITY THROUGHOUT STUDY DUE TO PATIENT BODY HABITUS.  THIS WILL REDUCE SENSITIVITY OF TEST FOR DIAGNOSIS OF CAD  Patient noted chest pain with dobutamine, but there were no ST segment  changes and the echo pictures show hyperdynamic LV with cavity obliteration,  EF>70% and no regional wall motion abnormalities. No ischemia identified on  this test. (limited image quality due to patient body habitus)date: results:ECG reviewed date: results:6/2017: SR, RBBB date: results:          METS/Exercise Tolerance:     Hematologic:     (+) Anemia, History of Transfusion -      Musculoskeletal:   (+) arthritis, , , -       GI/Hepatic:     (+) GERD (gastroparesis; no reflux symptoms after diet change to liquid) Other GI/Hepatic PUD      Renal/Genitourinary:  - ROS Renal section negative       Endo: Comment: Iatrogenic cushings - not on steroids      (+) type II DM Last HgA1c: 6.8 date: 2/2018 Not using insulin - not using insulin pump not previously admitted for DM/DKA thyroid problem hypothyroidism, Obesity, .      Psychiatric:     (+) psychiatric history anxiety and depression      Infectious Disease:   (+) MRSA, Other Infectious Disease frequent UTI      Malignancy:      - no malignancy   Other:    (+) No chance of pregnancy C-spine cleared: N/A, H/O Chronic Pain,H/O chronic opiod use , other significant disability Wheelchair  bound                   Physical Exam      Airway   Mallampati: II  TM distance: >3 FB  Neck ROM: full    Dental   (+) lower dentures and upper dentures    Cardiovascular   Rhythm and rate: regular and normal  (-) no weak pulses and no murmur    Pulmonary    breath sounds clear to auscultation(-) no rhonchi                    Anesthesia Plan      History & Physical Review      ASA Status:  3 .    NPO Status:  > 8 hours    Plan for MAC with Intravenous induction. Maintenance will be TIVA.      MAC. PIVx1. Standard ASA monitors. IV opioids. SDS postop. Discussed GA and airway adjuncts if any respiratory concerns arise. Patient prefers MAC over general. Discussed aspiration risks with her and discussed intubation as need for airway protection as needed.    All Q&A answered from patient/family        Postoperative Care      Consents  Anesthetic plan, risks, benefits and alternatives discussed with:  Patient..                          .

## 2018-03-08 NOTE — ANESTHESIA POSTPROCEDURE EVALUATION
Patient: Bhavani White    Procedure(s):  Cystoscopy, Botox Injection Into The Bladder  - Wound Class: II-Clean Contaminated    Diagnosis:Neurogenic Bladder   Diagnosis Additional Information: No value filed.    Anesthesia Type:  MAC    Note:  Anesthesia Post Evaluation    Patient location during evaluation: PACU  Patient participation: Able to fully participate in evaluation  Level of consciousness: awake and alert  Pain management: adequate  Airway patency: patent  Cardiovascular status: acceptable  Respiratory status: acceptable  Hydration status: acceptable  PONV: none     Anesthetic complications: None          Last vitals:  Vitals:    03/08/18 1200 03/08/18 1215 03/08/18 1300   BP: (!) 109/30 118/68 (!) 68/55   Resp: 18 16 16   Temp:   36.7  C (98.1  F)   SpO2: 100% 95% 97%         Electronically Signed By: Costa Manriquez MD  March 8, 2018  1:21 PM

## 2018-03-08 NOTE — OP NOTE
PRE-OPERATIVE DIAGNOSIS:   1.  Neurogenic bladder    POST-OPERATIVE DIAGNOSIS:  1.  Neurogenic bladder    PROCEDURE:    1. Cystourethroscopy.  2. Injection of botulinum toxin A 200units in 20cc sterile saline  3. SPT change (24F)      SURGEON:  Yoandy Rios MD    FELLOW: None    RESIDENT: Darshan Ulloa    ESTIMATED BLOOD LOSS:  5 mL    DRAINS: new SPT changed (24F)    SIGNIFICANT FINDINGS: Uncomplicated botox injection (200units), suprapubic tube changed      OPERATIVE INDICATIONS:  62 year old female with neurogenic bladder secondary of unclear etiology who receives botox for management of bladder spasms. Last injection: 8/2017    OPERATIVE DETAILS:  The patient was taken to the operating room in her usual state of health.   She was positioned in modified dorsal lithotomy with yellowfin stirrups.  Her genitalia were prepped and draped in the standard fashion. A time-out was performed to ensure proper patient, procedure and positioning.  The patient received appropriate IV antibiotics prior to the procedure.    A 21-[place] Valdovinos injection cystoscope was placed into the bladder.  The bladder mucosa appeared to be normal without stones, tumors or diverticulum on 360 degree inspection. The ureteral orifices were in the normal orthotopic position bilaterally.  We used 200 units of botox. We performed a template injection procedure with 5 columns of 4 injections. All injections were placed deep to the mucosa into the detrusor muscle.  We then emptied her bladder to re-inspect our injection sites and found that there was a minimal amount of blood. Her bladder was then emptied again.    SPT was changed to another 24F, 10cc was placed in the balloon. The balloon was visually confirmed to be in the bladder.     The patient was returned to supine position and transported to recovery in stable condition.    PLAN: f/u in 1 month in clinic to see if the botox injection helped her      Darshan Ulloa MD  Urology  Resident

## 2018-03-08 NOTE — ANESTHESIA CARE TRANSFER NOTE
Patient: Bhavani White    Procedure(s):  Cystoscopy, Botox Injection Into The Bladder  - Wound Class: II-Clean Contaminated    Diagnosis: Neurogenic Bladder   Diagnosis Additional Information: No value filed.    Anesthesia Type:   MAC     Note:  Airway :Face Mask  Patient transferred to:Phase II  Comments: To Phase II on supplemental O2 with + air exchange, placed on monitor. Report given to RN, questions answered, VSS, patient alert and comfortable.Handoff Report: Identifed the Patient, Identified the Reponsible Provider, Reviewed the pertinent medical history, Discussed the surgical course, Reviewed Intra-OP anesthesia mangement and issues during anesthesia, Set expectations for post-procedure period and Allowed opportunity for questions and acknowledgement of understanding      Vitals: (Last set prior to Anesthesia Care Transfer)    CRNA VITALS  3/8/2018 1113 - 3/8/2018 1149      3/8/2018             Pulse: 79    SpO2: 98 %                Electronically Signed By: ELISSA Paiz CRNA  March 8, 2018  11:49 AM

## 2018-03-08 NOTE — IP AVS SNAPSHOT
Same Day Surgery [address] [address]    Phone:  [phone]                                       After Visit Summary   3/8/2018    Bhavani White    MRN: [id number]           After Visit Summary Signature Page     I have received my discharge instructions, and my questions have been answered. I have discussed any challenges I see with this plan with the nurse or doctor.    ..........................................................................................................................................  Patient/Patient Representative Signature      ..........................................................................................................................................  Patient Representative Print Name and Relationship to Patient    ..................................................               ................................................  Date                                            Time    ..........................................................................................................................................  Reviewed by Signature/Title    ...................................................              ..............................................  Date                                                            Time

## 2018-03-08 NOTE — DISCHARGE INSTRUCTIONS
[facility]  Same-Day Surgery   Adult Discharge Orders & Instructions     For 24 hours after surgery    1. Get plenty of rest.  A responsible adult must stay with you for at least 24 hours after you leave the hospital.   2. Do not drive or use heavy equipment.  If you have weakness or tingling, don't drive or use heavy equipment until this feeling goes away.  3. Do not drink alcohol.  4. Avoid strenuous or risky activities.  Ask for help when climbing stairs.   5. You may feel lightheaded.  IF so, sit for a few minutes before standing.  Have someone help you get up.   6. If you have nausea (feel sick to your stomach): Drink only clear liquids such as apple juice, ginger ale, broth or 7-Up.  Rest may also help.  Be sure to drink enough fluids.  Move to a regular diet as you feel able.  7. You may have a slight fever. Call the doctor if your fever is over 100 F (37.7 C) (taken under the tongue) or lasts longer than 24 hours.  8. You may have a dry mouth, a sore throat, muscle aches or trouble sleeping.  These should go away after 24 hours.  9. Do not make important or legal decisions.   Call your doctor for any of the followin.  Signs of infection (fever, growing tenderness at the surgery site, a large amount of drainage or bleeding, severe pain, foul-smelling drainage, redness, swelling).    2. It has been over 8 to 10 hours since surgery and you are still not able to urinate (pass water).    3.  Headache for over 24 hours.    4.  Numbness, tingling or weakness the day after surgery (if you had spinal anesthesia).  To contact a doctor, call Dr Lisa's office at [phone] or:        [phone] and ask for the resident on call for urology (answered 24 hours a day)      Emergency Department:    [facility]: [phone]       (TTY for hearing impaired: [phone])

## 2018-03-08 NOTE — IP AVS SNAPSHOT
MRN:[id number]                      After Visit Summary   3/8/2018    Bhavani White    MRN: [id number]           Thank you!     Thank you for choosing [facility] for your care. Our goal is always to provide you with excellent care. Hearing back from our patients is one way we can continue to improve our services. Please take a few minutes to complete the written survey that you may receive in the mail after you visit with us. Thank you!        Patient Information     Date Of Birth          1955        About your hospital stay     You were admitted on:  March 8, 2018 You last received care in the:  [facility]    You were discharged on:  March 8, 2018       Who to Call     For medical emergencies, please call 911.  For non-urgent questions about your medical care, please call your primary care provider or clinic, [phone]  For questions related to your surgery, please call your surgery clinic        Attending Provider     Provider Specialty    Yoandy Rios MD Urology       Primary Care Provider Office Phone # Fax #    [place] Natasha Dueñas,  [phone]      After Care Instructions     Discharge Instructions       Activity  - No strenuous exercise for 3-5 days.  - No lifting, pushing, pulling more than 15 pounds for 3-5 days.   - Do not strain with bowel movements.  - Do not drive until you can press the brake pedal quickly and fully without pain.   - Do not operate a motor vehicle while taking narcotic pain medications.   - Some blood in the urine is expected. Increase your fluid intake to help flush the blood out of your bladder      Medications  - Continue taking the prescribed antibiotic (macrobid) for another 3 days. A new prescription has been ordered to be filled at the [facility] discharge pharmacy  - No narcotic pain medications are required and tylenol should suffice.  Wean yourself off all pain medications as you are able.  - Some pain  "medications contain both tylenol (acetaminophen) and a narcotic (Norco, vicodin, percocet), do not take more than 4,000mg of Tylenol (acetaminophen) from all sources in any 24 hour period.  - Take over the counter fiber (metamucil or benefiber) and stool softeners (miralax, docusate or senna) to prevent postoperative constipation, but stop if you develop diarrhea.  - No driving or operating machinery while taking narcotic pain medications     Follow-Up:  - Follow up when your symptoms return for your next botox injection  - Call your primary care provider to touch base regarding your recent admission.     - Call or return sooner than your regularly scheduled visit if you develop any of the following: fever (greater than 101.5), uncontrolled pain, uncontrolled nausea or vomiting, as well as worsening blood in the urine    Phone numbers:   - Monday through Friday 8am to 4:30pm: Call [phone] with questions or to schedule or confirm appointment.    - Nights or weekends: call the after hours emergency pager - [phone] and tell the  \"I would like to page the Urology Resident on call.\"  - For emergencies, call [phone]                  Further instructions from your care team       [facility], [place]  Same-Day Surgery   Adult Discharge Orders & Instructions     For 24 hours after surgery    1. Get plenty of rest.  A responsible adult must stay with you for at least 24 hours after you leave the hospital.   2. Do not drive or use heavy equipment.  If you have weakness or tingling, don't drive or use heavy equipment until this feeling goes away.  3. Do not drink alcohol.  4. Avoid strenuous or risky activities.  Ask for help when climbing stairs.   5. You may feel lightheaded.  IF so, sit for a few minutes before standing.  Have someone help you get up.   6. If you have nausea (feel sick to your stomach): Drink only clear liquids such as apple juice, ginger ale, broth or 7-Up.  Rest " "may also help.  Be sure to drink enough fluids.  Move to a regular diet as you feel able.  7. You may have a slight fever. Call the doctor if your fever is over 100 F (37.7 C) (taken under the tongue) or lasts longer than 24 hours.  8. You may have a dry mouth, a sore throat, muscle aches or trouble sleeping.  These should go away after 24 hours.  9. Do not make important or legal decisions.   Call your doctor for any of the followin.  Signs of infection (fever, growing tenderness at the surgery site, a large amount of drainage or bleeding, severe pain, foul-smelling drainage, redness, swelling).    2. It has been over 8 to 10 hours since surgery and you are still not able to urinate (pass water).    3.  Headache for over 24 hours.    4.  Numbness, tingling or weakness the day after surgery (if you had spinal anesthesia).  To contact a doctor, call Dr Lisa's office at [phone] or:        [phone] and ask for the resident on call for urology (answered 24 hours a day)      Emergency Department:    [facility]: [phone]       (TTY for hearing impaired: [phone])          Pending Results     No orders found from 3/6/2018 to 3/9/2018.            Statement of Approval     Ordered          18 1148  I have reviewed and agree with all the recommendations and orders detailed in this document.  EFFECTIVE NOW     Approved and electronically signed by:  Darshan Ulloa MD             Admission Information     Date & Time Provider Department Dept. Phone    3/8/2018 Yoandy Rios MD Same Day Surgery [facility] [phone]      Your Vitals Were     Blood Pressure Temperature Respirations Height Weight Pulse Oximetry    109/30 98.1  F (36.7  C) (Oral) 18 1.702 m (5' 7\") 112.7 kg (248 lb 7.3 oz) 100%    BMI (Body Mass Index)                   38.91 kg/m2           MyChart Information     [facility] gives you secure access to your electronic health record. If you see a primary care " provider, you can also send messages to your care team and make appointments. If you have questions, please call your primary care clinic.  If you do not have a primary care provider, please call [phone] and they will assist you.        Care EveryWhere ID     This is your Care EveryWhere ID. This could be used by other organizations to access your [facility] medical records  [id number]        Equal Access to Services     [facility] JASON : Hadii bert pendleton hadgertrudiso Soarielali, waaxda luqadaha, qaybta kaalmada aderebeccaroyalda, rowan quinterossuzettesarah gilbert. So [facility] [phone].    ATENCIÓN: Si habla español, tiene a shay disposición servicios gratuitos de asistencia lingüística. Zoraida al [phone].    We comply with applicable federal civil rights laws and [place] laws. We do not discriminate on the basis of race, color, national origin, age, disability, sex, sexual orientation, or gender identity.               Review of your medicines      START taking        Dose / Directions    acetaminophen 325 MG tablet   Commonly known as:  TYLENOL   Used for:  Neurogenic bladder        Dose:  325-650 mg   Take 1-2 tablets (325-650 mg) by mouth every 6 hours as needed for other (mild pain)   Quantity:  25 tablet   Refills:  0         CONTINUE these medicines which may have CHANGED, or have new prescriptions. If we are uncertain of the size of tablets/capsules you have at home, strength may be listed as something that might have changed.        Dose / Directions    * senna-docusate 8.6-50 MG per tablet   Commonly known as:  SENOKOT-S;PERICOLACE   This may have changed:  Another medication with the same name was added. Make sure you understand how and when to take each.   Used for:  Acute respiratory failure with hypoxia and hypercapnia (H)        Dose:  2 tablet   Take 2 tablets by mouth 2 times daily   Quantity:  100 tablet   Refills:  0       * senna-docusate 8.6-50 MG per tablet   Commonly known as:  SENOKOT-S;PERICOLACE    This may have changed:  You were already taking a medication with the same name, and this prescription was added. Make sure you understand how and when to take each.   Used for:  Neurogenic bladder        Dose:  2 tablet   Take 2 tablets by mouth 2 times daily To prevent constipation while taking narcotic pain medication. Start with 1 tablet twice daily. If no bowel movement in 24 hours, increase to 2 tablets twice daily.  Discontinue if you have loose stools or when you are no longer taking narcotics.   Quantity:  30 tablet   Refills:  0       * Notice:  This list has 2 medication(s) that are the same as other medications prescribed for you. Read the directions carefully, and ask your doctor or other care provider to review them with you.      CONTINUE these medicines which have NOT CHANGED        Dose / Directions    * albuterol 108 (90 BASE) MCG/ACT Inhaler   Commonly known as:  PROAIR HFA/PROVENTIL HFA/VENTOLIN HFA   Used for:  Moderate persistent asthma with acute exacerbation        Dose:  2 puff   Inhale 2 puffs into the lungs every 6 hours as needed for shortness of breath / dyspnea or wheezing   Quantity:  1 Inhaler   Refills:  11       * albuterol (2.5 MG/3ML) 0.083% neb solution   Used for:  Moderate persistent asthma with acute exacerbation        Dose:  1 vial   Take 1 vial (2.5 mg) by nebulization every 4 hours as needed for shortness of breath / dyspnea or wheezing   Quantity:  30 vial   Refills:  1       aspirin 81 MG tablet   Used for:  Type 2 diabetes, HbA1c goal < 7% (H)        Take by mouth daily   Quantity:  30 tablet   Refills:  0       blood glucose monitoring lancets   Used for:  Type 2 diabetes mellitus with diabetic nephropathy (H)        Use to test blood sugar 1 times daily or as directed.   Quantity:  1 Box   Refills:  prn       blood glucose monitoring meter device kit   Used for:  Type 2 diabetes mellitus with diabetic nephropathy (H)        Use to test blood sugar 1 times daily or  as directed.   Quantity:  1 kit   Refills:  0       CLONIDINE HCL PO        Dose:  0.1 mg   Take 0.1 mg by mouth 3 times daily as needed   Refills:  0       CYMBALTA PO        Dose:  120 mg   Take 120 mg by mouth daily   Refills:  0       gabapentin 600 MG tablet   Commonly known as:  NEURONTIN        Dose:  1200 mg   1,200 mg 3 times daily   Refills:  2       levothyroxine 88 MCG tablet   Commonly known as:  SYNTHROID/LEVOTHROID   Used for:  Hypothyroidism due to acquired atrophy of thyroid        Dose:  88 mcg   Take 1 tablet (88 mcg) by mouth daily   Quantity:  90 tablet   Refills:  0       lisinopril 2.5 MG tablet   Commonly known as:  PRINIVIL/Zestril   Used for:  Hypertension goal BP (blood pressure) < 140/90        Dose:  2.5 mg   Take 1 tablet (2.5 mg) by mouth daily   Quantity:  90 tablet   Refills:  1       metFORMIN 500 MG 24 hr tablet   Commonly known as:  GLUCOPHAGE-XR   Used for:  Type 2 diabetes mellitus with diabetic nephropathy, unspecified long term insulin use status (H)        Dose:  500 mg   Take 1 tablet (500 mg) by mouth 2 times daily (with meals)   Quantity:  360 tablet   Refills:  0       methadone 10 MG tablet   Commonly known as:  DOLOPHINE   Used for:  Acute respiratory failure with hypoxia and hypercapnia (H)        Take 2 pills TID   Quantity:  150 tablet   Refills:  0       MIRALAX PO        1 capful daily prn   Refills:  0       MULTIVITAMIN PO        1 a day   Refills:  0       nitroFURantoin (macrocrystal-monohydrate) 100 MG capsule   Commonly known as:  MACROBID   Used for:  Dysuria        Dose:  100 mg   Take 1 capsule (100 mg) by mouth 2 times daily for 3 days   Quantity:  6 capsule   Refills:  0       nystatin [id number] UNIT/GM Powd   Commonly known as:  MYCOSTATIN   Used for:  Candidiasis of skin        Apply topically 3 times daily as needed   Quantity:  30 g   Refills:  1       ondansetron 4 MG ODT tab   Commonly known as:  ZOFRAN ODT   Used for:  Nausea and vomiting,  intractability of vomiting not specified, unspecified vomiting type        Dose:  4-8 mg   Take 1-2 tablets (4-8 mg) by mouth every 8 hours as needed for nausea   Quantity:  20 tablet   Refills:  1       * order for DME   Used for:  Wheelchair bound, Ankylosing spondylitis (H), Spinal stenosis in cervical region, Lumbar spinal stenosis, Fibromyalgia, Chronic low back pain        Equipment being ordered: Hospital Bed, total electric (head, foot, and height adjustments), with any type side rails, with mattress   Quantity:  1 each   Refills:  0       * order for DME   Used for:  Wheelchair bound, Ankylosing spondylitis (H), Spinal stenosis in cervical region, Lumbar spinal stenosis, Fibromyalgia, Chronic low back pain        Equipment being ordered: Motorized Wheelchair   Quantity:  1 each   Refills:  0       * order for DME   Used for:  Wheelchair bound, Ankylosing spondylitis (H), Spinal stenosis in cervical region, Lumbar spinal stenosis, Fibromyalgia, Chronic low back pain        Equipment being ordered: Trapeze bar for hospital bed   Quantity:  1 each   Refills:  0       * order for DME   Used for:  Moderate persistent asthma with acute exacerbation        Equipment being ordered: Nebulizer   Quantity:  1 Package   Refills:  0       * order for DME   Used for:  Acute respiratory failure with hypoxia and hypercapnia (H)        Equipment being ordered: BIPAP.  15/5, Rate of 12, 2L O2 to keep sats 90-95%.   Quantity:  1 each   Refills:  0       * order for DME   Used for:  Moderate persistent asthma with acute exacerbation        Equipment being ordered: Nebulizer   Quantity:  1 each   Refills:  0       PYRIDIUM PO        Dose:  2 tablet   Take 2 tablets by mouth 3 times daily as needed Dose unknown   Refills:  0       simvastatin 20 MG tablet   Commonly known as:  ZOCOR   Used for:  Hyperlipidemia LDL goal <100        Dose:  20 mg   Take 1 tablet (20 mg) by mouth At Bedtime   Quantity:  90 tablet   Refills:  3        tiZANidine 4 MG tablet   Commonly known as:  ZANAFLEX        Dose:  4 mg   Take 1 tablet (4 mg) by mouth 3 times daily   Quantity:  90 tablet   Refills:  0       traZODone 100 MG tablet   Commonly known as:  DESYREL   Used for:  Insomnia        TAKE 1 TABLET(100 MG) BY MOUTH AT BEDTIME   Quantity:  90 tablet   Refills:  1       * Notice:  This list has 8 medication(s) that are the same as other medications prescribed for you. Read the directions carefully, and ask your doctor or other care provider to review them with you.         Where to get your medicines      These medications were sent to [facility] [place], [place] - [address]  [address] [address]     Phone:  [phone]     acetaminophen 325 MG tablet    nitroFURantoin (macrocrystal-monohydrate) 100 MG capsule         Some of these will need a paper prescription and others can be bought over the counter. Ask your nurse if you have questions.     Bring a paper prescription for each of these medications     senna-docusate 8.6-50 MG per tablet                Protect others around you: Learn how to safely use, store and throw away your medicines at www.disposemymeds.org.             Medication List: This is a list of all your medications and when to take them. Check marks below indicate your daily home schedule. Keep this list as a reference.      Medications           Morning Afternoon Evening Bedtime As Needed    acetaminophen 325 MG tablet   Commonly known as:  TYLENOL   Take 1-2 tablets (325-650 mg) by mouth every 6 hours as needed for other (mild pain)                                * albuterol 108 (90 BASE) MCG/ACT Inhaler   Commonly known as:  PROAIR HFA/PROVENTIL HFA/VENTOLIN HFA   Inhale 2 puffs into the lungs every 6 hours as needed for shortness of breath / dyspnea or wheezing                                * albuterol (2.5 MG/3ML) 0.083% neb solution   Take 1 vial (2.5 mg) by nebulization every 4  hours as needed for shortness of breath / dyspnea or wheezing                                aspirin 81 MG tablet   Take by mouth daily                                blood glucose monitoring lancets   Use to test blood sugar 1 times daily or as directed.                                blood glucose monitoring meter device kit   Use to test blood sugar 1 times daily or as directed.                                CLONIDINE HCL PO   Take 0.1 mg by mouth 3 times daily as needed                                CYMBALTA PO   Take 120 mg by mouth daily                                gabapentin 600 MG tablet   Commonly known as:  NEURONTIN   1,200 mg 3 times daily                                levothyroxine 88 MCG tablet   Commonly known as:  SYNTHROID/LEVOTHROID   Take 1 tablet (88 mcg) by mouth daily                                lisinopril 2.5 MG tablet   Commonly known as:  PRINIVIL/Zestril   Take 1 tablet (2.5 mg) by mouth daily                                metFORMIN 500 MG 24 hr tablet   Commonly known as:  GLUCOPHAGE-XR   Take 1 tablet (500 mg) by mouth 2 times daily (with meals)                                methadone 10 MG tablet   Commonly known as:  DOLOPHINE   Take 2 pills TID                                MIRALAX PO   1 capful daily prn                                MULTIVITAMIN PO   1 a day                                nitroFURantoin (macrocrystal-monohydrate) 100 MG capsule   Commonly known as:  MACROBID   Take 1 capsule (100 mg) by mouth 2 times daily for 3 days                                nystatin [id number] UNIT/GM Powd   Commonly known as:  MYCOSTATIN   Apply topically 3 times daily as needed                                ondansetron 4 MG ODT tab   Commonly known as:  ZOFRAN ODT   Take 1-2 tablets (4-8 mg) by mouth every 8 hours as needed for nausea                                * order for DME   Equipment being ordered: Hospital Bed, total electric (head, foot, and height adjustments), with any  type side rails, with mattress                                * order for DME   Equipment being ordered: Motorized Wheelchair                                * order for DME   Equipment being ordered: Trapeze bar for hospital bed                                * order for DME   Equipment being ordered: Nebulizer                                * order for DME   Equipment being ordered: BIPAP.  15/5, Rate of 12, 2L O2 to keep sats 90-95%.                                * order for DME   Equipment being ordered: Nebulizer                                PYRIDIUM PO   Take 2 tablets by mouth 3 times daily as needed Dose unknown                                * senna-docusate 8.6-50 MG per tablet   Commonly known as:  SENOKOT-S;PERICOLACE   Take 2 tablets by mouth 2 times daily                                * senna-docusate 8.6-50 MG per tablet   Commonly known as:  SENOKOT-S;PERICOLACE   Take 2 tablets by mouth 2 times daily To prevent constipation while taking narcotic pain medication. Start with 1 tablet twice daily. If no bowel movement in 24 hours, increase to 2 tablets twice daily.  Discontinue if you have loose stools or when you are no longer taking narcotics.                                simvastatin 20 MG tablet   Commonly known as:  ZOCOR   Take 1 tablet (20 mg) by mouth At Bedtime                                tiZANidine 4 MG tablet   Commonly known as:  ZANAFLEX   Take 1 tablet (4 mg) by mouth 3 times daily                                traZODone 100 MG tablet   Commonly known as:  DESYREL   TAKE 1 TABLET(100 MG) BY MOUTH AT BEDTIME                                * Notice:  This list has 10 medication(s) that are the same as other medications prescribed for you. Read the directions carefully, and ask your doctor or other care provider to review them with you.

## 2018-03-09 ENCOUNTER — CARE COORDINATION (OUTPATIENT)
Dept: UROLOGY | Facility: CLINIC | Age: 63
End: 2018-03-09

## 2018-03-09 DIAGNOSIS — N32.89 BLADDER SPASMS: Primary | ICD-10-CM

## 2018-03-09 RX ORDER — OXYBUTYNIN CHLORIDE 10 MG/1
10 TABLET, EXTENDED RELEASE ORAL DAILY
Qty: 7 TABLET | Refills: 0 | Status: SHIPPED | OUTPATIENT
Start: 2018-03-09 | End: 2018-03-12

## 2018-03-09 NOTE — PROGRESS NOTES
Spoke with Dr. Levin about patients painful bladder spasms.  Prescribed Oxybutynin 10 mg XL daily x 7days.  She can stop the Oxybutynin once her bladder spasm pain has subsided.  Recommended that patient continue to take her Senna as Oxybutynin can cause constipation.  Patient states understanding.    Jaki Dimas RN  Care Coordinator- Reconstructive Urology

## 2018-03-09 NOTE — PROGRESS NOTES
Urology Postop Phone Note:    Ms. Bhavani White is a 62 year old female who underwent Cystoscopy and Botox injection into the bladder on 3/8/18 with Dr. Rios for a history of neurogenic bladder.  Her postoperative course was unremarkable and the patient was d/c to home on 3/9/18.    Fevers/chills: Patient denies any fever or chills.  Eating/drinking: Patient is able to eat and drink without any complaints.  Bowel habits: Patient reports no bowel movement since surgery.  Urine output Lundberg catheter Color Yellow Clarity Clear Odor None  Pain: Patient reports pain as a 10 out of 10 when having bladder spasms. The pain is located in her bladder.  Message sent to Dr. Levin.  Narcotics: The patient has been taking Methodone 10 mg TID for pain medication and is able to control the pain.  Antibiotics: Yes. Macrobid 100 mg BID x 3 days.    Informed the patient of the clinic triage nursing # ([phone] option 3) and urology resident on call # for after hours concerns.    Jaki Dimas RN  Care Coordinator - Reconstructive Urology

## 2018-03-12 ENCOUNTER — CARE COORDINATION (OUTPATIENT)
Dept: UROLOGY | Facility: CLINIC | Age: 63
End: 2018-03-12

## 2018-03-12 DIAGNOSIS — N32.89 BLADDER SPASMS: ICD-10-CM

## 2018-03-12 RX ORDER — OXYBUTYNIN CHLORIDE 10 MG/1
10 TABLET, EXTENDED RELEASE ORAL DAILY
Qty: 90 TABLET | Refills: 3 | Status: SHIPPED | OUTPATIENT
Start: 2018-03-12 | End: 2019-03-11 | Stop reason: DRUGHIGH

## 2018-03-12 NOTE — PROGRESS NOTES
pt decline to schedule  Received: Today       Taty Johnson Nina, RN                     Rashad Pollack,     Patient decline to schedule 1 month f/u with Rasta for post-op botox injection. Per patient, if need to come will call and/or will send message via [employer]. I did not schedule the patient.     Taty            Previous Messages       ----- Message -----      From: Jaki Dimas RN      Sent: 3/8/2018  12:08 PM        To: Clinic Coordinators-Uro     Hello,     Please schedule pt for 1 month f/u with rasta for post op botox injections     Jaki Petersen   ----- Message -----      From: Darshan Ulloa MD      Sent: 3/8/2018  11:46 AM        To: THANG Drake     Can you please schedule this pt to see Rasta Dinero in 1 month for follow up. She underwent botox injections today.     Thanks!   Darshan Ulloa MD   Urology Resident

## 2018-03-19 ENCOUNTER — MYC MEDICAL ADVICE (OUTPATIENT)
Dept: FAMILY MEDICINE | Facility: CLINIC | Age: 63
End: 2018-03-19

## 2018-03-19 DIAGNOSIS — E11.21 TYPE 2 DIABETES MELLITUS WITH DIABETIC NEPHROPATHY (H): Primary | ICD-10-CM

## 2018-03-20 ENCOUNTER — MYC MEDICAL ADVICE (OUTPATIENT)
Dept: FAMILY MEDICINE | Facility: CLINIC | Age: 63
End: 2018-03-20

## 2018-03-20 DIAGNOSIS — E11.21 TYPE 2 DIABETES MELLITUS WITH DIABETIC NEPHROPATHY (H): ICD-10-CM

## 2018-03-20 NOTE — TELEPHONE ENCOUNTER
See other encounter  Lab Results   Component Value Date    A1C 6.8 02/15/2018    A1C 6.7 11/01/2017     Recent Labs   Lab Test  11/01/17   1535  11/18/16   1132   12/12/14   1345  06/26/13   CHOL  159  147   < >  133   --   156   HDL  54  48*   < >  58   --   43   LDL  59  64   < >  32   < >  75   TRIG  229*  174*   < >  216*   --   192*   CHOLHDLRATIO   --    --    --   2.3   --   3.63    < > = values in this interval not displayed.     BP Readings from Last 2 Encounters:   03/08/18 (!) 68/55   02/15/18 132/68     Prescription approved per [facility] [place].  kEta Mccarthy, RN, BSN

## 2018-03-20 NOTE — TELEPHONE ENCOUNTER
Lab Results   Component Value Date    A1C 6.8 02/15/2018    A1C 6.7 11/01/2017     Recent Labs   Lab Test  11/01/17   1535  11/18/16   1132   12/12/14   1345  06/26/13   CHOL  159  147   < >  133   --   156   HDL  54  48*   < >  58   --   43   LDL  59  64   < >  32   < >  75   TRIG  229*  174*   < >  216*   --   192*   CHOLHDLRATIO   --    --    --   2.3   --   3.63    < > = values in this interval not displayed.     BP Readings from Last 2 Encounters:   03/08/18 (!) 68/55   02/15/18 132/68     Prescription approved per [facility] Refill Protocol.  Ekta Mccarthy, RN, BSN

## 2018-03-30 DIAGNOSIS — E03.4 HYPOTHYROIDISM DUE TO ACQUIRED ATROPHY OF THYROID: ICD-10-CM

## 2018-03-30 NOTE — TELEPHONE ENCOUNTER
"Requested Prescriptions   Pending Prescriptions Disp Refills     levothyroxine (SYNTHROID/LEVOTHROID) 88 MCG tablet [Pharmacy Med Name: LEVOTHYROXINE 0.088MG (88MCG) TAB] 90 tablet 0     Sig: TAKE 1 TABLET BY MOUTH EVERY DAY    Thyroid Protocol Passed    3/30/2018  1:59 PM       Passed - Patient is 12 years or older       Passed - Recent (12 mo) or future (30 days) visit within the authorizing provider's specialty    Patient had office visit in the last 12 months or has a visit in the next 30 days with authorizing provider or within the authorizing provider's specialty.  See \"Patient Info\" tab in inbasket, or \"Choose Columns\" in Meds & Orders section of the refill encounter.           Passed - Normal TSH on file in past 12 months    Recent Labs   Lab Test  11/01/17   1535   TSH  0.52             Passed - No active pregnancy on record    If patient is pregnant or has had a positive pregnancy test, please check TSH.         Passed - No positive pregnancy test in past 12 months    If patient is pregnant or has had a positive pregnancy test, please check TSH.          Last Written Prescription Date:  12/14/17  Last Fill Quantity: 90,  # refills: 0   Last office visit: 2/15/2018 with prescribing provider:  Susan Haase   Future Office Visit:      "

## 2018-04-02 RX ORDER — LEVOTHYROXINE SODIUM 88 UG/1
TABLET ORAL
Qty: 90 TABLET | Refills: 2 | Status: SHIPPED | OUTPATIENT
Start: 2018-04-02 | End: 2018-12-18

## 2018-04-02 NOTE — TELEPHONE ENCOUNTER
Prescription approved per [facility] Refill Protocol.  Ekta Mccarthy RN, BSN

## 2018-04-12 ENCOUNTER — TELEPHONE (OUTPATIENT)
Dept: FAMILY MEDICINE | Facility: CLINIC | Age: 63
End: 2018-04-12

## 2018-04-12 NOTE — TELEPHONE ENCOUNTER
Called pt, discussed, may also f/u with dentist, pt informs has not seen dentist in over 2 years, informed appointment needed, here or UC, pt hung up the phone  Ekta Mccarthy RN, BSN  Message handled by Nurse Triage.

## 2018-04-12 NOTE — TELEPHONE ENCOUNTER
Oh yes, I remember this now.  I really need the office notes from the dentist.  Not just the xray. I am not trained in how to interpret these xrays.  She really needs to come in to be seen for this.  This is not something we can to via an evisit or over the phone.  I would highly recommend urgent care for this.

## 2018-04-12 NOTE — TELEPHONE ENCOUNTER
I got this email today regarding this patient.  It has an attached XR of her jaw I think?  But it's not labeled or anything.  I'm not really sure what do do with this.  I didn't request this X-ray.  I'm guessing the patient requested that it be sent?  Can you call her to get some clarification on this?  I just want to make sure she didn't have questions or something.    Shawnee    Good Morning     This is a x ray image a patient wanted you to see    From: Geraldine Joe [[email or web address]]   Sent: Wednesday, April 11, 2018 5:21 PM  To: Winsome Fraga  Subject: Image for HAVEN White from [employer]    Good evening,    As per your request, please see the attached Panoramic image for Bhavani White, which was taken 3/27/18. Please let me know if you're in need of further assistance.    Sincerely,      Geraldine Joe  Patient     phone: [phone]  fax: [phone]  direct: [phone]  [email or web address]    [address], [address]  [place] [address]    CONFIDENTIALITY NOTICE: This e-mail and any attachments are intended solely for the use of the named recipient or recipients.  Any dissemination of this e-mail by anyone other than an intended recipient is strictly prohibited. If you are not a named recipient, you are prohibited from any further viewing of the e-mail or any attachments or from making any use of the e-mail or attachments. If you believe you have received this e-mail in error, notify the sender immediately and permanently delete the e-mail, any attachments, and all copies thereof from any drives or storage media and destroy any printouts of the e-mail or attachments.

## 2018-04-12 NOTE — TELEPHONE ENCOUNTER
Called pt, went to [facility], told them to send to you as they would not treat pt unless consented to their treatment, pt informs cannot afford, wears dentures, pt has pain and tenderness in this area, Upper right side, pt wants antibiotic for abscess? They sent xray per pt request, pt wants you to review and advise, routed, inform pt when final  [phone] (home) none (work)  Ekta Mccarthy RN, BSN  Message handled by Nurse Triage.

## 2018-05-07 ENCOUNTER — TELEPHONE (OUTPATIENT)
Dept: UROLOGY | Facility: CLINIC | Age: 63
End: 2018-05-07

## 2018-05-07 DIAGNOSIS — N39.0 URINARY TRACT INFECTION: Primary | ICD-10-CM

## 2018-05-07 NOTE — TELEPHONE ENCOUNTER
Patient called and is having UTI symptoms after having her sp tube changed on Saturday   Orders placed patient contacted. Karey Lucio LPN Staff Nurse

## 2018-05-08 ENCOUNTER — TRANSFERRED RECORDS (OUTPATIENT)
Dept: HEALTH INFORMATION MANAGEMENT | Facility: CLINIC | Age: 63
End: 2018-05-08

## 2018-05-08 DIAGNOSIS — N39.0 URINARY TRACT INFECTION: ICD-10-CM

## 2018-05-08 LAB
ALBUMIN UR-MCNC: 30 MG/DL
AMORPH CRY #/AREA URNS HPF: ABNORMAL /HPF
APPEARANCE UR: ABNORMAL
BACTERIA #/AREA URNS HPF: ABNORMAL /HPF
BILIRUB UR QL STRIP: NEGATIVE
COLOR UR AUTO: YELLOW
GLUCOSE UR STRIP-MCNC: NEGATIVE MG/DL
HGB UR QL STRIP: ABNORMAL
KETONES UR STRIP-MCNC: NEGATIVE MG/DL
LEUKOCYTE ESTERASE UR QL STRIP: ABNORMAL
NITRATE UR QL: POSITIVE
NON-SQ EPI CELLS #/AREA URNS LPF: ABNORMAL /LPF
PH UR STRIP: 7 PH (ref 5–7)
RBC #/AREA URNS AUTO: ABNORMAL /HPF
SOURCE: ABNORMAL
SP GR UR STRIP: 1.01 (ref 1–1.03)
UROBILINOGEN UR STRIP-ACNC: 0.2 EU/DL (ref 0.2–1)
WBC #/AREA URNS AUTO: ABNORMAL /HPF

## 2018-05-08 PROCEDURE — 87186 SC STD MICRODIL/AGAR DIL: CPT | Performed by: FAMILY MEDICINE

## 2018-05-08 PROCEDURE — 87088 URINE BACTERIA CULTURE: CPT | Performed by: FAMILY MEDICINE

## 2018-05-08 PROCEDURE — 81001 URINALYSIS AUTO W/SCOPE: CPT | Performed by: FAMILY MEDICINE

## 2018-05-08 PROCEDURE — 87086 URINE CULTURE/COLONY COUNT: CPT | Performed by: FAMILY MEDICINE

## 2018-05-11 ENCOUNTER — CARE COORDINATION (OUTPATIENT)
Dept: UROLOGY | Facility: CLINIC | Age: 63
End: 2018-05-11

## 2018-05-11 DIAGNOSIS — R30.0 DYSURIA: Primary | ICD-10-CM

## 2018-05-11 RX ORDER — SULFAMETHOXAZOLE/TRIMETHOPRIM 800-160 MG
1 TABLET ORAL 2 TIMES DAILY
Qty: 10 TABLET | Refills: 0 | Status: SHIPPED | OUTPATIENT
Start: 2018-05-11 | End: 2018-05-16

## 2018-05-11 NOTE — PROGRESS NOTES
[facility]    UROLOGICAL ORDER FORM      Patient Information:    Customer's Name: Bhavani White  Date of Birth: [birth year]  Address: [address] [place] [address]  City / State / Zip: [facility]/[address]  Phone #: [phone]  Diagnosis: NEUROGENIC BLADDER    Product Needed:    24 Irish Silver Alloy Latex Nguyen Catheter 5 cc balloon  Instruction: Please change your nguyen catheter every 6 weeks  QTY: 1 per month  Length of need: 99      ORDERING Physician/PA/MICHAEL Rios MD  DATE: 05/14/2018    [facility]  [address]  [place], [place], [address]      FAX to [facility] [address]  [address] [place] [address], [address]  Phone: [phone]  Fax: [phone]  Email: [email or web address]

## 2018-05-12 LAB
BACTERIA SPEC CULT: ABNORMAL
BACTERIA SPEC CULT: ABNORMAL
SPECIMEN SOURCE: ABNORMAL

## 2018-05-17 DIAGNOSIS — G47.00 INSOMNIA: ICD-10-CM

## 2018-05-17 NOTE — TELEPHONE ENCOUNTER
"Last Written Prescription Date:  11/28/17  Last Fill Quantity: 90 tablet,  # refills: 1   Last office visit: 2/15/2018 with prescribing provider:  Haase   Future Office Visit:      Requested Prescriptions   Pending Prescriptions Disp Refills     traZODone (DESYREL) 100 MG tablet [Pharmacy Med Name: TRAZODONE 100MG TABLETS] 90 tablet 0     Sig: TAKE 1 TABLET(100 MG) BY MOUTH AT BEDTIME    Serotonin Modulators Passed    5/17/2018  3:47 AM       Passed - Recent (12 mo) or future (30 days) visit within the authorizing provider's specialty    Patient had office visit in the last 12 months or has a visit in the next 30 days with authorizing provider or within the authorizing provider's specialty.  See \"Patient Info\" tab in inbasket, or \"Choose Columns\" in Meds & Orders section of the refill encounter.           Passed - Patient is age 18 or older       Passed - No active pregnancy on record       Passed - No positive pregnancy test in past 12 months          "

## 2018-05-18 RX ORDER — TRAZODONE HYDROCHLORIDE 100 MG/1
100 TABLET ORAL AT BEDTIME
Qty: 90 TABLET | Refills: 1 | Status: SHIPPED | OUTPATIENT
Start: 2018-05-18 | End: 2018-11-12

## 2018-05-18 NOTE — TELEPHONE ENCOUNTER
Prescription approved per [facility] Refill Protocol  Shaniqua Nelson RN BS

## 2018-05-21 ENCOUNTER — OFFICE VISIT (OUTPATIENT)
Dept: FAMILY MEDICINE | Facility: CLINIC | Age: 63
End: 2018-05-21
Payer: COMMERCIAL

## 2018-05-21 VITALS
OXYGEN SATURATION: 95 % | BODY MASS INDEX: 38.54 KG/M2 | TEMPERATURE: 98.2 F | RESPIRATION RATE: 16 BRPM | DIASTOLIC BLOOD PRESSURE: 82 MMHG | SYSTOLIC BLOOD PRESSURE: 128 MMHG | HEART RATE: 80 BPM | WEIGHT: 246.1 LBS

## 2018-05-21 DIAGNOSIS — R07.0 THROAT PAIN: Primary | ICD-10-CM

## 2018-05-21 LAB
DEPRECATED S PYO AG THROAT QL EIA: NORMAL
SPECIMEN SOURCE: NORMAL

## 2018-05-21 PROCEDURE — 87081 CULTURE SCREEN ONLY: CPT | Performed by: NURSE PRACTITIONER

## 2018-05-21 PROCEDURE — 99213 OFFICE O/P EST LOW 20 MIN: CPT | Performed by: NURSE PRACTITIONER

## 2018-05-21 PROCEDURE — 87880 STREP A ASSAY W/OPTIC: CPT | Performed by: NURSE PRACTITIONER

## 2018-05-21 ASSESSMENT — ANXIETY QUESTIONNAIRES
IF YOU CHECKED OFF ANY PROBLEMS ON THIS QUESTIONNAIRE, HOW DIFFICULT HAVE THESE PROBLEMS MADE IT FOR YOU TO DO YOUR WORK, TAKE CARE OF THINGS AT HOME, OR GET ALONG WITH OTHER PEOPLE: SOMEWHAT DIFFICULT
7. FEELING AFRAID AS IF SOMETHING AWFUL MIGHT HAPPEN: SEVERAL DAYS
1. FEELING NERVOUS, ANXIOUS, OR ON EDGE: SEVERAL DAYS
3. WORRYING TOO MUCH ABOUT DIFFERENT THINGS: SEVERAL DAYS
2. NOT BEING ABLE TO STOP OR CONTROL WORRYING: SEVERAL DAYS
GAD7 TOTAL SCORE: 6
5. BEING SO RESTLESS THAT IT IS HARD TO SIT STILL: NOT AT ALL
6. BECOMING EASILY ANNOYED OR IRRITABLE: SEVERAL DAYS

## 2018-05-21 ASSESSMENT — PATIENT HEALTH QUESTIONNAIRE - PHQ9: 5. POOR APPETITE OR OVEREATING: SEVERAL DAYS

## 2018-05-21 NOTE — MR AVS SNAPSHOT
After Visit Summary   5/21/2018    Bhavani White    MRN: [id number]           Patient Information     Date Of Birth          1955        Visit Information        Provider Department      5/21/2018 1:40 PM Mila Miller Ra, APRN CNP [facility]        Today's Diagnoses     Throat pain    -  1      Care Instructions    Monitor symptoms closely.  Salt water gargles.          Follow-ups after your visit        Follow-up notes from your care team     Return in about 1 month (around 6/21/2018) for follow up.      Your next 10 appointments already scheduled     May 25, 2018  8:00 AM CDT   NM GASTRIC EMPTYING SH RH WY PH GH with [id number]   [facility] [place] (Formerly [facility])    [address]  [facility] [address] [phone]           Please bring a list of your medicines to the exam. (Include vitamins, minerals and over-the-counter drugs.) You should wear comfortable clothes. Leave your valuables at home. Please bring related prior results and films.  Tell your doctor:   If you are breastfeeding or may be pregnant.   If you have had a barium test within the past few days. Barium may change the results of certain exams.  No food or drink (including water) for 4 hours prior to the exam.  Please call your Imaging Department at your exam site with any questions.              Who to contact     If you have questions or need follow up information about today's clinic visit or your schedule please contact [facility] directly at [phone].  Normal or non-critical lab and imaging results will be communicated to you by MyChart, letter or phone within 4 business days after the clinic has received the results. If you do not hear from us within 7 days, please contact the clinic through MyChart or phone. If you have a critical or abnormal lab result, we will notify you by phone as soon as possible.  Submit refill requests  through [employer] or call your pharmacy and they will forward the refill request to us. Please allow 3 business days for your refill to be completed.          Additional Information About Your Visit        [facility] Information     [employer] gives you secure access to your electronic health record. If you see a primary care provider, you can also send messages to your care team and make appointments. If you have questions, please call your primary care clinic.  If you do not have a primary care provider, please call [phone] and they will assist you.        Care EveryWhere ID     This is your Care EveryWhere ID. This could be used by other organizations to access your [place] medical records  [id number]        Your Vitals Were     Pulse Temperature Respirations Pulse Oximetry BMI (Body Mass Index)       80 98.2  F (36.8  C) (Oral) 16 95% 38.54 kg/m2        Blood Pressure from Last 3 Encounters:   05/21/18 128/82   03/08/18 (!) 68/55   02/15/18 132/68    Weight from Last 3 Encounters:   05/21/18 246 lb 1.6 oz (111.6 kg)   03/08/18 248 lb 7.3 oz (112.7 kg)   02/15/18 251 lb (113.9 kg)              We Performed the Following     Beta strep group A culture     DEPRESSION ACTION PLAN (DAP)     Strep, Rapid Screen        Primary Care Provider Office Phone # Fax #    Shawnee Dueñas [phone] [address] [facility] [address]        Equal Access to Services     [facility] : Hadii aad ku hadasho Soarielali, waaxda luqadaha, qaybta kaalmada taco, rowan gilbert. So [facility] [phone].    ATENCIÓN: Si habla español, tiene a shay disposición servicios gratuitos de asistencia lingüística. Llame al [phone].    We comply with applicable federal civil rights laws and [place] laws. We do not discriminate on the basis of race, color, national origin, age, disability, sex, sexual orientation, or gender identity.            Thank you!     Thank you for choosing  [facility]  for your care. Our goal is always to provide you with excellent care. Hearing back from our patients is one way we can continue to improve our services. Please take a few minutes to complete the written survey that you may receive in the mail after your visit with us. Thank you!             Your Updated Medication List - Protect others around you: Learn how to safely use, store and throw away your medicines at www.disposemymeds.org.          This list is accurate as of 5/21/18  2:03 PM.  Always use your most recent med list.                   Brand Name Dispense Instructions for use Diagnosis    acetaminophen 325 MG tablet    TYLENOL    25 tablet    Take 1-2 tablets (325-650 mg) by mouth every 6 hours as needed for other (mild pain)    Neurogenic bladder       * albuterol 108 (90 Base) MCG/ACT Inhaler    PROAIR HFA/PROVENTIL HFA/VENTOLIN HFA    1 Inhaler    Inhale 2 puffs into the lungs every 6 hours as needed for shortness of breath / dyspnea or wheezing    Moderate persistent asthma with acute exacerbation       * albuterol (2.5 MG/3ML) 0.083% neb solution     30 vial    Take 1 vial (2.5 mg) by nebulization every 4 hours as needed for shortness of breath / dyspnea or wheezing    Moderate persistent asthma with acute exacerbation       aspirin 81 MG tablet     30 tablet    Take by mouth daily    Type 2 diabetes, HbA1c goal < 7% (H)       blood glucose lancets standard    no brand specified    100 each    Use to test blood sugar 1 times daily or as directed.    Type 2 diabetes mellitus with diabetic nephropathy (H)       blood glucose monitoring meter device kit    no brand specified    1 kit    Use to test blood sugar 1 times daily or as directed.    Type 2 diabetes mellitus with diabetic nephropathy (H)       blood glucose monitoring test strip    no brand specified    100 strip    Use to test blood sugars 1 times daily or as directed    Type 2 diabetes mellitus with diabetic  nephropathy (H)       CLONIDINE HCL PO      Take 0.1 mg by mouth 3 times daily as needed        CYMBALTA PO      Take 120 mg by mouth daily        gabapentin 600 MG tablet    NEURONTIN     1,200 mg 3 times daily        levothyroxine 88 MCG tablet    SYNTHROID/LEVOTHROID    90 tablet    TAKE 1 TABLET BY MOUTH EVERY DAY    Hypothyroidism due to acquired atrophy of thyroid       lisinopril 2.5 MG tablet    PRINIVIL/Zestril    90 tablet    Take 1 tablet (2.5 mg) by mouth daily    Hypertension goal BP (blood pressure) < 140/90       metFORMIN 500 MG 24 hr tablet    GLUCOPHAGE-XR    360 tablet    Take 1 tablet (500 mg) by mouth 2 times daily (with meals)    Type 2 diabetes mellitus with diabetic nephropathy, unspecified long term insulin use status (H)       methadone 10 MG tablet    DOLOPHINE    150 tablet    Take 2 pills TID    Acute respiratory failure with hypoxia and hypercapnia (H)       MIRALAX PO      1 capful daily prn        MULTIVITAMIN PO      1 a day        nystatin [id number] UNIT/GM Powd    MYCOSTATIN    30 g    Apply topically 3 times daily as needed    Candidiasis of skin       ondansetron 4 MG ODT tab    ZOFRAN ODT    20 tablet    Take 1-2 tablets (4-8 mg) by mouth every 8 hours as needed for nausea    Nausea and vomiting, intractability of vomiting not specified, unspecified vomiting type       * order for DME     1 each    Equipment being ordered: Hospital Bed, total electric (head, foot, and height adjustments), with any type side rails, with mattress    Wheelchair bound, Ankylosing spondylitis (H), Spinal stenosis in cervical region, Lumbar spinal stenosis, Fibromyalgia, Chronic low back pain       * order for DME     1 each    Equipment being ordered: Motorized Wheelchair    Wheelchair bound, Ankylosing spondylitis (H), Spinal stenosis in cervical region, Lumbar spinal stenosis, Fibromyalgia, Chronic low back pain       * order for DME     1 each    Equipment being ordered: Trapeze bar for hospital bed     Wheelchair bound, Ankylosing spondylitis (H), Spinal stenosis in cervical region, Lumbar spinal stenosis, Fibromyalgia, Chronic low back pain       * order for DME     1 Package    Equipment being ordered: Nebulizer    Moderate persistent asthma with acute exacerbation       * order for DME     1 each    Equipment being ordered: BIPAP.  15/5, Rate of 12, 2L O2 to keep sats 90-95%.    Acute respiratory failure with hypoxia and hypercapnia (H)       * order for DME     1 each    Equipment being ordered: Nebulizer    Moderate persistent asthma with acute exacerbation       oxybutynin 10 MG 24 hr tablet    DITROPAN XL    90 tablet    Take 1 tablet (10 mg) by mouth daily    Bladder spasms       PYRIDIUM PO      Take 2 tablets by mouth 3 times daily as needed Dose unknown        * senna-docusate 8.6-50 MG per tablet    SENOKOT-S;PERICOLACE    100 tablet    Take 2 tablets by mouth 2 times daily    Acute respiratory failure with hypoxia and hypercapnia (H)       * senna-docusate 8.6-50 MG per tablet    SENOKOT-S;PERICOLACE    30 tablet    Take 2 tablets by mouth 2 times daily To prevent constipation while taking narcotic pain medication. Start with 1 tablet twice daily. If no bowel movement in 24 hours, increase to 2 tablets twice daily.  Discontinue if you have loose stools or when you are no longer taking narcotics.    Neurogenic bladder       simvastatin 20 MG tablet    ZOCOR    90 tablet    Take 1 tablet (20 mg) by mouth At Bedtime    Hyperlipidemia LDL goal <100       tiZANidine 4 MG tablet    ZANAFLEX    90 tablet    Take 1 tablet (4 mg) by mouth 3 times daily        * traZODone 100 MG tablet    DESYREL    90 tablet    TAKE 1 TABLET(100 MG) BY MOUTH AT BEDTIME    Insomnia       * traZODone 100 MG tablet    DESYREL    90 tablet    Take 1 tablet (100 mg) by mouth At Bedtime    Insomnia       * Notice:  This list has 12 medication(s) that are the same as other medications prescribed for you. Read the directions  carefully, and ask your doctor or other care provider to review them with you.

## 2018-05-21 NOTE — PROGRESS NOTES
SUBJECTIVE:   Bhavani White is a 62 year old female who presents to clinic today for the following health issues:      Acute Illness   Acute illness concerns: throat and ear pain   Onset: over 1 week    Fever: no    Chills/Sweats: no    Headache (location?): YES    Sinus Pressure:YES    Conjunctivitis:  no    Ear Pain: YES: bilateral    Rhinorrhea: YES    Congestion: no    Sore Throat: YES     Cough: YES - mild amounts    Wheeze: no    Decreased Appetite: YES    Nausea: no    Vomiting: no    Diarrhea:  no    Dysuria/Freq.: no    Fatigue/Achiness: YES    Sick/Strep Exposure: no     Therapies Tried and outcome: ibuprofen     Pt presents with 2 weeks of symptoms.  Sore throat.  Bilateral ear pain, L>R.  No fever.  No cough.  She was treated for UTI about 1 week ago with bactrim.  Did not help her symptoms at all.  Taking in food and fluids.    Problem list and histories reviewed & adjusted, as indicated.  Additional history: as documented    Patient Active Problem List   Diagnosis     Chronic low back pain     Moderate major depression (H)     Anxiety     Type 2 diabetes mellitus with diabetic nephropathy (H)     Hypothyroidism due to acquired atrophy of thyroid     Suprapubic catheter (H)     Lumbar spinal stenosis     Fibromyalgia     Osteoarthritis     Hypertension goal BP (blood pressure) < 140/90     Health Care Home     Neuropathy     Hyperlipidemia LDL goal <100     ACP (advance care planning)     Recurrent UTI (urinary tract infection)     Controlled substance agreement signed 10/24/2014     Chronic narcotic dependence (H)     History of ulcer disease     Wheelchair bound     Asthma     Personal history of methicillin resistant Staphylococcus aureus     Fracture of lower extremity     Neurogenic bladder     Ventral hernia     Osteoarthritis of cervical spine with myelopathy     DISH (diffuse idiopathic skeletal hyperostosis)     Morbid obesity, unspecified obesity type (H)     Methadone use (H)     Past  Surgical History:   Procedure Laterality Date     ABDOMEN SURGERY       C LAMINECTOMY,FACETECTOMY,LUMBAR  1982     C TOTAL KNEE ARTHROPLASTY  1999    left     CYSTOSCOPY, INTRAVESICAL INJECTION N/A 8/23/2017    Procedure: CYSTOSCOPY, INTRAVESICAL INJECTION;  Cystoscopy, Botox Injection Into The Bladder  Latex Allergy ;  Surgeon: Yoandy Rios MD;  Location: [facility] OR     CYSTOSCOPY, INTRAVESICAL INJECTION N/A 3/8/2018    Procedure: CYSTOSCOPY, INTRAVESICAL INJECTION;  Cystoscopy, Botox Injection Into The Bladder and suprapubic catheter exchange;  Surgeon: Yoandy Rios MD;  Location: [facility] OR     DISCECTOMY, FUSION CERVICAL ANTERIOR THREE+ LEVELS, COMBINED Left 5/3/2016    Procedure: COMBINED DISCECTOMY, FUSION CERVICAL ANTERIOR THREE+ LEVELS;  Surgeon: Jasvir Torres MD;  Location: [facility] OR     GENITOURINARY SURGERY      suprapubic catheter     HERNIA REPAIR  1957    double hernia     HYSTERECTOMY, PAP NO LONGER INDICATED      CELIA and large ovarian tumor removed     SURGICAL HISTORY OF -       left cataract surgery     SURGICAL HISTORY OF -   12/14    right cataract surgery and left laser revision     SURGICAL HISTORY OF -   March 2015    left carpal tunnel release     TONSILLECTOMY  1974       Social History   Substance Use Topics     Smoking status: Never Smoker     Smokeless tobacco: Never Used     Alcohol use No     Family History   Problem Relation Age of Onset     Depression Son      Hypertension Mother      HEART DISEASE Mother      bypass     Depression Mother      Hypertension Father      Connective Tissue Disorder Father      ankylosing spondylitis     CANCER Father      colon; prostate     Other Cancer Father      bladder cancer     Depression Sister            Reviewed and updated as needed this visit by clinical staff       Reviewed and updated as needed this visit by Provider         ROS:  SEE HPI.    OBJECTIVE:     /82 (BP Location: Right arm, Patient Position: Chair, Cuff Size:  Adult Regular)  Pulse 80  Temp 98.2  F (36.8  C) (Oral)  Resp 16  Wt 246 lb 1.6 oz (111.6 kg)  SpO2 95%  BMI 38.54 kg/m2  Body mass index is 38.54 kg/(m^2).  GENERAL: healthy, alert and no distress  EYES: Eyes grossly normal to inspection  HENT: normal cephalic/atraumatic, ear canals and TM's normal, nose and mouth without ulcers or lesions, oral mucous membranes moist and OP injected, thick drainage noted.  NECK: no adenopathy, no asymmetry, masses, or scars and thyroid normal to palpation  RESP: lungs clear to auscultation - no rales, rhonchi or wheezes  CV: regular rates and rhythm and normal S1 S2, no S3 or S4  PSYCH: mentation appears normal, affect normal/bright    Diagnostic Test Results:  Results for orders placed or performed in visit on 05/21/18 (from the past 24 hour(s))   Strep, Rapid Screen   Result Value Ref Range    Specimen Description Throat     Rapid Strep A Screen       NEGATIVE: No Group A streptococcal antigen detected by immunoassay, await culture report.       ASSESSMENT/PLAN:   1. Throat pain  62 y.o. Female, here today with concerns regarding sore throat.  RST negative.  Likely viral.  Monitor.  Fluids, salt water gargles, flonase.  Return to clinic if symptoms persist or worsen.    Pt agrees with plan and verbalized understanding.  - DEPRESSION ACTION PLAN (DAP)  - Strep, Rapid Screen  - Beta strep group A culture    ELISSA Palomares Ra, CNP  [facility]

## 2018-05-22 LAB
BACTERIA SPEC CULT: NORMAL
SPECIMEN SOURCE: NORMAL

## 2018-05-22 ASSESSMENT — PATIENT HEALTH QUESTIONNAIRE - PHQ9: SUM OF ALL RESPONSES TO PHQ QUESTIONS 1-9: 3

## 2018-05-22 ASSESSMENT — ANXIETY QUESTIONNAIRES: GAD7 TOTAL SCORE: 6

## 2018-05-24 ENCOUNTER — TELEPHONE (OUTPATIENT)
Dept: UROLOGY | Facility: CLINIC | Age: 63
End: 2018-05-24

## 2018-05-24 NOTE — TELEPHONE ENCOUNTER
[facility] Call Center    Phone Message    May a detailed message be left on voicemail: yes    Reason for Call: Order(s): Home Care Orders: Other: Cath changes once a month for two months, 2 PRN visits as needed for any issues or complications. Please call [facility] at [phone] for verbal orders    Action Taken: Message routed to:  [facility] ([facility] URO AND PROSTATE

## 2018-05-25 ENCOUNTER — HOSPITAL ENCOUNTER (OUTPATIENT)
Dept: NUCLEAR MEDICINE | Facility: CLINIC | Age: 63
Setting detail: NUCLEAR MEDICINE
Discharge: HOME | End: 2018-05-25
Attending: INTERNAL MEDICINE | Admitting: INTERNAL MEDICINE
Payer: MEDICARE

## 2018-05-25 DIAGNOSIS — R10.13 EPIGASTRIC PAIN: ICD-10-CM

## 2018-05-25 PROCEDURE — A9541 TC99M SULFUR COLLOID: HCPCS | Performed by: RADIOLOGY

## 2018-05-25 PROCEDURE — 34300033 ZZH RX 343: Performed by: RADIOLOGY

## 2018-05-25 PROCEDURE — 78264 GASTRIC EMPTYING IMG STUDY: CPT

## 2018-05-25 RX ADMIN — Medication 2 MCI.: at 08:19

## 2018-05-29 ENCOUNTER — TRANSFERRED RECORDS (OUTPATIENT)
Dept: HEALTH INFORMATION MANAGEMENT | Facility: CLINIC | Age: 63
End: 2018-05-29

## 2018-06-10 ENCOUNTER — MYC REFILL (OUTPATIENT)
Dept: FAMILY MEDICINE | Facility: CLINIC | Age: 63
End: 2018-06-10

## 2018-06-10 DIAGNOSIS — J45.41 MODERATE PERSISTENT ASTHMA WITH ACUTE EXACERBATION: ICD-10-CM

## 2018-06-11 RX ORDER — ALBUTEROL SULFATE 90 UG/1
2 AEROSOL, METERED RESPIRATORY (INHALATION) EVERY 6 HOURS PRN
Qty: 1 INHALER | Refills: 5 | Status: SHIPPED | OUTPATIENT
Start: 2018-06-11 | End: 2018-12-04

## 2018-06-11 NOTE — TELEPHONE ENCOUNTER
Message from [facility]:  Original authorizing provider: DO Bhavani Carroll would like a refill of the following medications:  albuterol (PROAIR HFA/PROVENTIL HFA/VENTOLIN HFA) 108 (90 BASE) MCG/ACT Inhaler [Shawnee Dueñas DO]    Preferred pharmacy: [facility] [address] [place], [place] [address] [address]    Comment:

## 2018-06-11 NOTE — TELEPHONE ENCOUNTER
Has refills, resent  Prescription approved per [facility] Refill Protocol.  Ekta Mccarthy, RN, BSN

## 2018-06-12 DIAGNOSIS — I10 HYPERTENSION GOAL BP (BLOOD PRESSURE) < 140/90: ICD-10-CM

## 2018-06-12 RX ORDER — LISINOPRIL 2.5 MG/1
TABLET ORAL
Qty: 90 TABLET | Refills: 3 | Status: SHIPPED | OUTPATIENT
Start: 2018-06-12 | End: 2019-06-03

## 2018-06-12 NOTE — TELEPHONE ENCOUNTER
"Last Written Prescription Date:  12/14/17  Last Fill Quantity: 90 tablet,  # refills: 1   Last office visit: 12/14/2017 with prescribing provider:  [facility]   Future Office Visit:      Requested Prescriptions   Pending Prescriptions Disp Refills     lisinopril (PRINIVIL/ZESTRIL) 2.5 MG tablet [Pharmacy Med Name: LISINOPRIL 2.5MG TABLETS] 90 tablet 0     Sig: TAKE 1 TABLET(2.5 MG) BY MOUTH DAILY    ACE Inhibitors (Including Combos) Protocol Passed    6/12/2018  3:48 AM       Passed - Blood pressure under 140/90 in past 12 months    BP Readings from Last 3 Encounters:   05/21/18 128/82   03/08/18 (!) 68/55   02/15/18 132/68                Passed - Recent (12 mo) or future (30 days) visit within the authorizing provider's specialty    Patient had office visit in the last 12 months or has a visit in the next 30 days with authorizing provider or within the authorizing provider's specialty.  See \"Patient Info\" tab in inbasket, or \"Choose Columns\" in Meds & Orders section of the refill encounter.           Passed - Patient is age 18 or older       Passed - No active pregnancy on record       Passed - Normal serum creatinine on file in past 12 months    Recent Labs   Lab Test  02/15/18   1504   CR  0.75            Passed - Normal serum potassium on file in past 12 months    Recent Labs   Lab Test  02/15/18   1504   POTASSIUM  4.7            Passed - No positive pregnancy test in past 12 months          "

## 2018-06-12 NOTE — TELEPHONE ENCOUNTER
Prescription approved per [facility] Refill Protocol.  Ekta Mccarthy RN, BSN

## 2018-07-24 ENCOUNTER — TELEPHONE (OUTPATIENT)
Dept: UROLOGY | Facility: CLINIC | Age: 63
End: 2018-07-24

## 2018-07-24 NOTE — TELEPHONE ENCOUNTER
[facility] Call Center    Phone Message    May a detailed message be left on voicemail: yes    Reason for Call: Order(s): Home Care Orders: Other: Yisel from [facility] calling to obtain orders.  Please call her back.    Action Taken: Message routed to:  [facility] ([facility] urology

## 2018-07-24 NOTE — TELEPHONE ENCOUNTER
Spoke with Yisel patient Home Care nurse to let her know that it is ok for her to continue monthly catheter change.    Papa Hollis MA

## 2018-08-03 ENCOUNTER — TRANSFERRED RECORDS (OUTPATIENT)
Dept: HEALTH INFORMATION MANAGEMENT | Facility: CLINIC | Age: 63
End: 2018-08-03

## 2018-08-14 ENCOUNTER — TELEPHONE (OUTPATIENT)
Dept: FAMILY MEDICINE | Facility: CLINIC | Age: 63
End: 2018-08-14

## 2018-08-14 NOTE — TELEPHONE ENCOUNTER
"From from [facility] for [facility] 4x4 [facility], called pt, does not need these, \"old rx\", came from previous provider at [facility], Dr Gaffney, pt informs these are not the correct sponges, needs split sponges, called [facility], informs has rx on file from urology, will send split sponges to urology for processing, they will cancel old 4 x 4 rx from Dr Gaffney, no action needed, FYI to   Ekta Mccarthy RN, BSN  Message handled by Nurse Triage.    "

## 2018-08-15 ENCOUNTER — MYC MEDICAL ADVICE (OUTPATIENT)
Dept: FAMILY MEDICINE | Facility: CLINIC | Age: 63
End: 2018-08-15

## 2018-08-15 ENCOUNTER — OFFICE VISIT (OUTPATIENT)
Dept: FAMILY MEDICINE | Facility: CLINIC | Age: 63
End: 2018-08-15
Payer: COMMERCIAL

## 2018-08-15 VITALS
OXYGEN SATURATION: 98 % | RESPIRATION RATE: 14 BRPM | DIASTOLIC BLOOD PRESSURE: 60 MMHG | HEIGHT: 67 IN | HEART RATE: 64 BPM | WEIGHT: 238 LBS | TEMPERATURE: 98.6 F | SYSTOLIC BLOOD PRESSURE: 110 MMHG | BODY MASS INDEX: 37.35 KG/M2

## 2018-08-15 DIAGNOSIS — E78.5 HYPERLIPIDEMIA LDL GOAL <100: ICD-10-CM

## 2018-08-15 DIAGNOSIS — F11.20 CHRONIC NARCOTIC DEPENDENCE (H): ICD-10-CM

## 2018-08-15 DIAGNOSIS — Z93.59 SUPRAPUBIC CATHETER (H): ICD-10-CM

## 2018-08-15 DIAGNOSIS — E11.21 TYPE 2 DIABETES MELLITUS WITH DIABETIC NEPHROPATHY, WITHOUT LONG-TERM CURRENT USE OF INSULIN (H): ICD-10-CM

## 2018-08-15 DIAGNOSIS — M48.061 SPINAL STENOSIS OF LUMBAR REGION, UNSPECIFIED WHETHER NEUROGENIC CLAUDICATION PRESENT: ICD-10-CM

## 2018-08-15 DIAGNOSIS — F32.1 MODERATE MAJOR DEPRESSION (H): ICD-10-CM

## 2018-08-15 DIAGNOSIS — I10 HYPERTENSION GOAL BP (BLOOD PRESSURE) < 140/90: ICD-10-CM

## 2018-08-15 DIAGNOSIS — Z01.818 PREOP GENERAL PHYSICAL EXAM: Primary | ICD-10-CM

## 2018-08-15 DIAGNOSIS — M79.7 FIBROMYALGIA: ICD-10-CM

## 2018-08-15 DIAGNOSIS — E03.4 HYPOTHYROIDISM DUE TO ACQUIRED ATROPHY OF THYROID: ICD-10-CM

## 2018-08-15 DIAGNOSIS — G56.03 BILATERAL CARPAL TUNNEL SYNDROME: ICD-10-CM

## 2018-08-15 LAB — HBA1C MFR BLD: 6 % (ref 0–5.6)

## 2018-08-15 PROCEDURE — 99215 OFFICE O/P EST HI 40 MIN: CPT | Performed by: FAMILY MEDICINE

## 2018-08-15 PROCEDURE — 84439 ASSAY OF FREE THYROXINE: CPT | Performed by: FAMILY MEDICINE

## 2018-08-15 PROCEDURE — 84443 ASSAY THYROID STIM HORMONE: CPT | Performed by: FAMILY MEDICINE

## 2018-08-15 PROCEDURE — 80053 COMPREHEN METABOLIC PANEL: CPT | Performed by: FAMILY MEDICINE

## 2018-08-15 PROCEDURE — 36415 COLL VENOUS BLD VENIPUNCTURE: CPT | Performed by: FAMILY MEDICINE

## 2018-08-15 PROCEDURE — 83036 HEMOGLOBIN GLYCOSYLATED A1C: CPT | Performed by: FAMILY MEDICINE

## 2018-08-15 PROCEDURE — 93000 ELECTROCARDIOGRAM COMPLETE: CPT | Performed by: FAMILY MEDICINE

## 2018-08-15 NOTE — MR AVS SNAPSHOT
After Visit Summary   8/15/2018    Bhavani White    MRN: [id number]           Patient Information     Date Of Birth          1955        Visit Information        Provider Department      8/15/2018 11:20 AM Shawnee Dueñas DO [facility]        Today's Diagnoses     Preop general physical exam    -  1    Bilateral carpal tunnel syndrome        Moderate major depression (H)        Type 2 diabetes mellitus with diabetic nephropathy, without long-term current use of insulin (H)        Hypothyroidism due to acquired atrophy of thyroid        Suprapubic catheter (H)        Spinal stenosis of lumbar region, unspecified whether neurogenic claudication present        Fibromyalgia        Hypertension goal BP (blood pressure) < 140/90        Hyperlipidemia LDL goal <100        Chronic narcotic dependence (H)          Care Instructions      Before Your Surgery      Call your surgeon if there is any change in your health. This includes signs of a cold or flu (such as a sore throat, runny nose, cough, rash or fever).    Do not smoke, drink alcohol or take over the counter medicine (unless your surgeon or primary care doctor tells you to) for the 24 hours before and after surgery.    If you take prescribed drugs: Follow your doctor s orders about which medicines to take and which to stop until after surgery.    Eating and drinking prior to surgery: follow the instructions from your surgeon    Take a shower or bath the night before surgery. Use the soap your surgeon gave you to gently clean your skin. If you do not have soap from your surgeon, use your regular soap. Do not shave or scrub the surgery site.  Wear clean pajamas and have clean sheets on your bed.           Follow-ups after your visit        Who to contact     If you have questions or need follow up information about today's clinic visit or your schedule please contact [facility] directly at  "[phone].  Normal or non-critical lab and imaging results will be communicated to you by MyChart, letter or phone within 4 business days after the clinic has received the results. If you do not hear from us within 7 days, please contact the clinic through [employer] or phone. If you have a critical or abnormal lab result, we will notify you by phone as soon as possible.  Submit refill requests through [facility] [employer] or call your pharmacy and they will forward the refill request to us. Please allow 3 business days for your refill to be completed.          Additional Information About Your Visit        [facility] Information     [facility] gives you secure access to your electronic health record. If you see a primary care provider, you can also send messages to your care team and make appointments. If you have questions, please call your primary care clinic.  If you do not have a primary care provider, please call [phone] and they will assist you.        Care EveryWhere ID     This is your Care EveryWhere ID. This could be used by other organizations to access your [facility] records  [id number]        Your Vitals Were     Pulse Temperature Respirations Height Pulse Oximetry BMI (Body Mass Index)    64 98.6  F (37  C) (Oral) 14 5' 7\" (1.702 m) 98% 37.28 kg/m2       Blood Pressure from Last 3 Encounters:   08/15/18 110/60   05/21/18 128/82   03/08/18 (!) 68/55    Weight from Last 3 Encounters:   08/15/18 238 lb (108 kg)   05/21/18 246 lb 1.6 oz (111.6 kg)   03/08/18 248 lb 7.3 oz (112.7 kg)              We Performed the Following     Comprehensive metabolic panel     EKG 12-lead complete w/read - Clinics     Hemoglobin A1c     T4 free     TSH        Primary Care Provider Office Phone # Fax #    Shawnee Kaur DO [facility] [phone] [address] [facility] [id number]        Equal Access to Services     JOSE LUIS GOMEZ AH: Noni Lujan, wajoyada ubaldo, qaybta rowan condon " rico schneiderrebecca mcdaniels'aan ah. So [facility] [phone].    ATENCIÓN: Si huy pacheco, tiene a shay disposición servicios gratuitos de asistencia lingüística. Zoraida al [phone].    We comply with applicable federal civil rights laws and [place] laws. We do not discriminate on the basis of race, color, national origin, age, disability, sex, sexual orientation, or gender identity.            Thank you!     Thank you for choosing [facility]  for your care. Our goal is always to provide you with excellent care. Hearing back from our patients is one way we can continue to improve our services. Please take a few minutes to complete the written survey that you may receive in the mail after your visit with us. Thank you!             Your Updated Medication List - Protect others around you: Learn how to safely use, store and throw away your medicines at www.disposemymeds.org.          This list is accurate as of 8/15/18 12:20 PM.  Always use your most recent med list.                   Brand Name Dispense Instructions for use Diagnosis    acetaminophen 325 MG tablet    TYLENOL    25 tablet    Take 1-2 tablets (325-650 mg) by mouth every 6 hours as needed for other (mild pain)    Neurogenic bladder       * albuterol (2.5 MG/3ML) 0.083% neb solution     30 vial    Take 1 vial (2.5 mg) by nebulization every 4 hours as needed for shortness of breath / dyspnea or wheezing    Moderate persistent asthma with acute exacerbation       * albuterol 108 (90 Base) MCG/ACT inhaler    PROAIR HFA/PROVENTIL HFA/VENTOLIN HFA    1 Inhaler    Inhale 2 puffs into the lungs every 6 hours as needed for shortness of breath / dyspnea or wheezing    Moderate persistent asthma with acute exacerbation       aspirin 81 MG tablet     30 tablet    Take by mouth daily    Type 2 diabetes, HbA1c goal < 7% (H)       blood glucose lancets standard    no brand specified    100 each    Use to test blood sugar 1 times daily or as  directed.    Type 2 diabetes mellitus with diabetic nephropathy (H)       blood glucose monitoring meter device kit    no brand specified    1 kit    Use to test blood sugar 1 times daily or as directed.    Type 2 diabetes mellitus with diabetic nephropathy (H)       blood glucose monitoring test strip    no brand specified    100 strip    Use to test blood sugars 1 times daily or as directed    Type 2 diabetes mellitus with diabetic nephropathy (H)       CLONIDINE HCL PO      Take 0.1 mg by mouth 3 times daily as needed        CYMBALTA PO      Take 120 mg by mouth daily        gabapentin 600 MG tablet    NEURONTIN     1,200 mg 3 times daily        levothyroxine 88 MCG tablet    SYNTHROID/LEVOTHROID    90 tablet    TAKE 1 TABLET BY MOUTH EVERY DAY    Hypothyroidism due to acquired atrophy of thyroid       lisinopril 2.5 MG tablet    PRINIVIL/Zestril    90 tablet    TAKE 1 TABLET(2.5 MG) BY MOUTH DAILY    Hypertension goal BP (blood pressure) < 140/90       metFORMIN 500 MG 24 hr tablet    GLUCOPHAGE-XR    360 tablet    Take 1 tablet (500 mg) by mouth 2 times daily (with meals)    Type 2 diabetes mellitus with diabetic nephropathy, unspecified long term insulin use status (H)       methadone 10 MG tablet    DOLOPHINE    150 tablet    Take 2 pills TID    Acute respiratory failure with hypoxia and hypercapnia (H)       MIRALAX PO      1 capful daily prn        MULTIVITAMIN PO      1 a day        nystatin 043764 UNIT/GM Powd    MYCOSTATIN    30 g    Apply topically 3 times daily as needed    Candidiasis of skin       ondansetron 4 MG ODT tab    ZOFRAN ODT    20 tablet    Take 1-2 tablets (4-8 mg) by mouth every 8 hours as needed for nausea    Nausea and vomiting, intractability of vomiting not specified, unspecified vomiting type       * order for DME     1 each    Equipment being ordered: Hospital Bed, total electric (head, foot, and height adjustments), with any type side rails, with mattress    Wheelchair bound,  Ankylosing spondylitis (H), Spinal stenosis in cervical region, Lumbar spinal stenosis, Fibromyalgia, Chronic low back pain       * order for DME     1 each    Equipment being ordered: Motorized Wheelchair    Wheelchair bound, Ankylosing spondylitis (H), Spinal stenosis in cervical region, Lumbar spinal stenosis, Fibromyalgia, Chronic low back pain       * order for DME     1 each    Equipment being ordered: Trapeze bar for hospital bed    Wheelchair bound, Ankylosing spondylitis (H), Spinal stenosis in cervical region, Lumbar spinal stenosis, Fibromyalgia, Chronic low back pain       * order for DME     1 Package    Equipment being ordered: Nebulizer    Moderate persistent asthma with acute exacerbation       * order for DME     1 each    Equipment being ordered: BIPAP.  15/5, Rate of 12, 2L O2 to keep sats 90-95%.    Acute respiratory failure with hypoxia and hypercapnia (H)       * order for DME     1 each    Equipment being ordered: Nebulizer    Moderate persistent asthma with acute exacerbation       oxybutynin 10 MG 24 hr tablet    DITROPAN XL    90 tablet    Take 1 tablet (10 mg) by mouth daily    Bladder spasms       PYRIDIUM PO      Take 2 tablets by mouth 3 times daily as needed Dose unknown        * senna-docusate 8.6-50 MG per tablet    SENOKOT-S;PERICOLACE    100 tablet    Take 2 tablets by mouth 2 times daily    Acute respiratory failure with hypoxia and hypercapnia (H)       * senna-docusate 8.6-50 MG per tablet    SENOKOT-S;PERICOLACE    30 tablet    Take 2 tablets by mouth 2 times daily To prevent constipation while taking narcotic pain medication. Start with 1 tablet twice daily. If no bowel movement in 24 hours, increase to 2 tablets twice daily.  Discontinue if you have loose stools or when you are no longer taking narcotics.    Neurogenic bladder       simvastatin 20 MG tablet    ZOCOR    90 tablet    Take 1 tablet (20 mg) by mouth At Bedtime    Hyperlipidemia LDL goal <100       tiZANidine 4 MG  tablet    ZANAFLEX    90 tablet    Take 1 tablet (4 mg) by mouth 3 times daily        * traZODone 100 MG tablet    DESYREL    90 tablet    TAKE 1 TABLET(100 MG) BY MOUTH AT BEDTIME    Insomnia       * traZODone 100 MG tablet    DESYREL    90 tablet    Take 1 tablet (100 mg) by mouth At Bedtime    Insomnia       * Notice:  This list has 12 medication(s) that are the same as other medications prescribed for you. Read the directions carefully, and ask your doctor or other care provider to review them with you.

## 2018-08-15 NOTE — PROGRESS NOTES
[facility]  [address] [facility] [address]  [phone]  Dept: [phone]    PRE-OP EVALUATION:  Today's date: 8/15/2018    Bhavani White (: 1955) presents for pre-operative evaluation assessment as requested by Dr. Breen.  She requires evaluation and anesthesia risk assessment prior to undergoing surgery/procedure for treatment of R carpal tunnel syndrome.    [facility]    Fax number for surgical facility: [facility]  Primary Physician: Shawnee Dueñas  Type of Anesthesia Anticipated: General    Patient has a Health Care Directive or Living Will:  NO    Preop Questions 8/15/2018   Who is doing your surgery? Dr. Breen   What are you having done? Right carpal tunnel release   Date of Surgery/Procedure: 2018   Facility or Hospital where procedure/surgery will be performed: [facility]   1.  Do you have a history of Heart attack, stroke, stent, coronary bypass surgery, or other heart surgery? No   2.  Do you ever have any pain or discomfort in your chest? No   3.  Do you have a history of  Heart Failure? No   4.   Are you troubled by shortness of breath when:  walking on a level surface, or up a slight hill, or at night? No   5.  Do you currently have a cold, bronchitis or other respiratory infection? No   6.  Do you have a cough, shortness of breath, or wheezing? YES - asthma   7.  Do you sometimes get pains in the calves of your legs when you walk? No   8. Do you or anyone in your family have previous history of blood clots? No   9.  Do you or does anyone in your family have a serious bleeding problem such as prolonged bleeding following surgeries or cuts? No   10. Have you ever had problems with anemia or been told to take iron pills? No   11. Have you had any abnormal blood loss such as black, tarry or bloody stools, or abnormal vaginal bleeding? No   12. Have you ever had a blood transfusion? YES - no  complications   13. Have you or any of your relatives ever had problems with anesthesia? No   14. Do you have sleep apnea, excessive snoring or daytime drowsiness? YES - sleep apnea   15. Do you have any prosthetic heart valves? No   16. Do you have prosthetic joints? YES - L knee   17. Is there any chance that you may be pregnant? No     HPI:     HPI related to upcoming procedure: Patient has bilateral carpal tunnel syndrome.  Had the left one released previously.  Now going for the right side.  Did well with the surgery on the left       See problem list for active medical problems.  Problems all longstanding and stable, except as noted/documented.  See ROS for pertinent symptoms related to these conditions.                                                                                                                                                          .    MEDICAL HISTORY:     Patient Active Problem List    Diagnosis Date Noted     Methadone use (H) 2017     Priority: Medium     Morbid obesity, unspecified obesity type (H) 2016     Priority: Medium     Osteoarthritis of cervical spine with myelopathy 2016     Priority: Medium     DISH (diffuse idiopathic skeletal hyperostosis) 2016     Priority: Medium     Wheelchair bound 08/15/2015     Priority: Medium     Personal history of methicillin resistant Staphylococcus aureus 2015     Priority: Medium     Neurogenic bladder 2015     Priority: Medium     Ventral hernia 2015     Priority: Medium     History of ulcer disease 2014     Priority: Medium     She notes from NSAIDs; nearly . Discussed a hospitalization at [place] for this.       Chronic narcotic dependence (H) 2014     Priority: Medium     Recurrent UTI (urinary tract infection) 10/24/2014     Priority: Medium     Controlled substance agreement signed 10/24/2014 10/24/2014     Priority: Medium     She had a controlled substance agreement with [employer] until seen by pain clinic. Now followed by pain clinic; see chronic pain       Fracture of lower extremity 06/26/2013     Priority: Medium     ACP (advance care planning) 02/26/2013     Priority: Medium     Advance Care Planning:   Receipt of ACP document:  Received: Resuscitation Guidelines order which was witnessed and signed by provider on 7-22-09 and 3-18-10.  Document previously scanned on 7-27-09 and 3-19-10. Order reviewed and found to be valid.  Code Status  needs to be updated to reflect choices in most recent ACP document. Confirmed/documented designated decision maker(s). See permanent comments section of demographics in clinical tab. View document(s) and details by clicking on code status. Added by Jie Stewart RN System ACP Cordinator on 2/26/2013.             Hyperlipidemia LDL goal <100 05/13/2011     Priority: Medium     Neuropathy 02/21/2011     Priority: Medium     Asthma 02/15/2011     Priority: Medium     Suprapubic catheter (H) 02/11/2011     Priority: Medium     Lumbar spinal stenosis 02/11/2011     Priority: Medium     Fibromyalgia 02/11/2011     Priority: Medium     Osteoarthritis 02/11/2011     Priority: Medium     Hypertension goal BP (blood pressure) < 140/90 02/11/2011     Priority: Medium     Health Care Home 02/11/2011     Priority: Medium       Status:  No active care coordination   Care Coordinator:  Wendy Pearson [phone]  See Letters for [facility] Care Plan  Date:  May 23, 2016           Hypothyroidism due to acquired atrophy of thyroid 11/05/2010     Priority: Medium     Type 2 diabetes mellitus with diabetic nephropathy (H) 10/31/2010     Priority: Medium     Chronic low back pain 05/28/2009     Priority: Medium     1981       Moderate major depression (H) 05/28/2009     Priority: Medium     Anxiety 05/28/2009     Priority: Medium      Past Medical History:   Diagnosis Date     Anxiety      Asthma      Depression      DM (diabetes mellitus) (H) 2003     Frequent UTI       History of blood transfusion      History of ulcer disease 2014    She notes from NSAIDs; nearly . Discussed a hospitalization at [facility] for this.     Hypertension      Hypothyroid      Iatrogenic Cushing's disease (H)     off prednisone now     Morbid obesity (H)      MRSA (methicillin resistant staph aureus) culture positive 2012    repeat cx 10/12/2012 no staph mentioned.     Osteoarthritis     multiple joings.     Other chronic pain      Sleep apnea     No longer uses cpap.     Past Surgical History:   Procedure Laterality Date     ABDOMEN SURGERY       C LAMINECTOMY,FACETECTOMY,LUMBAR  1982     C TOTAL KNEE ARTHROPLASTY      left     CYSTOSCOPY, INTRAVESICAL INJECTION N/A 2017    Procedure: CYSTOSCOPY, INTRAVESICAL INJECTION;  Cystoscopy, Botox Injection Into The Bladder  Latex Allergy ;  Surgeon: Yoandy Rios MD;  Location: [facility] OR     CYSTOSCOPY, INTRAVESICAL INJECTION N/A 3/8/2018    Procedure: CYSTOSCOPY, INTRAVESICAL INJECTION;  Cystoscopy, Botox Injection Into The Bladder and suprapubic catheter exchange;  Surgeon: Yoandy Rios MD;  Location: [facility] OR     DISCECTOMY, FUSION CERVICAL ANTERIOR THREE+ LEVELS, COMBINED Left 5/3/2016    Procedure: COMBINED DISCECTOMY, FUSION CERVICAL ANTERIOR THREE+ LEVELS;  Surgeon: Jasvir Torres MD;  Location: [facility] OR     GENITOURINARY SURGERY      suprapubic catheter     HERNIA REPAIR      double hernia     HYSTERECTOMY, PAP NO LONGER INDICATED      CELIA and large ovarian tumor removed     SURGICAL HISTORY OF -       left cataract surgery     SURGICAL HISTORY OF -       right cataract surgery and left laser revision     SURGICAL HISTORY OF -   2015    left carpal tunnel release     TONSILLECTOMY  1974     Current Outpatient Prescriptions   Medication Sig Dispense Refill     acetaminophen (TYLENOL) 325 MG tablet Take 1-2 tablets (325-650 mg) by mouth every 6 hours as needed for other (mild pain) 25 tablet 0      albuterol (2.5 MG/3ML) 0.083% neb solution Take 1 vial (2.5 mg) by nebulization every 4 hours as needed for shortness of breath / dyspnea or wheezing 30 vial 1     albuterol (PROAIR HFA/PROVENTIL HFA/VENTOLIN HFA) 108 (90 Base) MCG/ACT Inhaler Inhale 2 puffs into the lungs every 6 hours as needed for shortness of breath / dyspnea or wheezing 1 Inhaler 5     aspirin 81 MG tablet Take by mouth daily 30 tablet      blood glucose (NO BRAND SPECIFIED) lancets standard Use to test blood sugar 1 times daily or as directed. 100 each 11     blood glucose monitoring (NO BRAND SPECIFIED) meter device kit Use to test blood sugar 1 times daily or as directed. 1 kit 0     blood glucose monitoring (NO BRAND SPECIFIED) test strip Use to test blood sugars 1 times daily or as directed 100 strip 11     CLONIDINE HCL PO Take 0.1 mg by mouth 3 times daily as needed       DULoxetine HCl (CYMBALTA PO) Take 120 mg by mouth daily        gabapentin (NEURONTIN) 600 MG tablet 1,200 mg 3 times daily   2     levothyroxine (SYNTHROID/LEVOTHROID) 88 MCG tablet TAKE 1 TABLET BY MOUTH EVERY DAY 90 tablet 2     lisinopril (PRINIVIL/ZESTRIL) 2.5 MG tablet TAKE 1 TABLET(2.5 MG) BY MOUTH DAILY 90 tablet 3     metFORMIN (GLUCOPHAGE-XR) 500 MG 24 hr tablet Take 1 tablet (500 mg) by mouth 2 times daily (with meals) 360 tablet 0     methadone (DOLOPHINE) 10 MG tablet Take 2 pills  tablet 0     MIRALAX OR 1 capful daily prn       MULTIVITAMIN OR 1 a day       nystatin (MYCOSTATIN) 642388 UNIT/GM POWD Apply topically 3 times daily as needed 30 g 1     ondansetron (ZOFRAN ODT) 4 MG ODT tab Take 1-2 tablets (4-8 mg) by mouth every 8 hours as needed for nausea 20 tablet 1     order for DME Equipment being ordered: Nebulizer 1 each 0     order for DME Equipment being ordered: BIPAP.   15/5, Rate of 12, 2L O2 to keep sats 90-95%. 1 each 0     order for DME Equipment being ordered: Nebulizer 1 Package 0     order for DME Equipment being ordered: Hospital  "Bed, total electric (head, foot, and height adjustments), with any type side rails, with mattress 1 each 0     order for DME Equipment being ordered: Motorized Wheelchair 1 each 0     order for DME Equipment being ordered: Trapeze bar for hospital bed 1 each 0     oxybutynin (DITROPAN XL) 10 MG 24 hr tablet Take 1 tablet (10 mg) by mouth daily 90 tablet 3     Phenazopyridine HCl (PYRIDIUM PO) Take 2 tablets by mouth 3 times daily as needed Dose unknown       senna-docusate (SENOKOT-S;PERICOLACE) 8.6-50 MG per tablet Take 2 tablets by mouth 2 times daily 100 tablet      senna-docusate (SENOKOT-S;PERICOLACE) 8.6-50 MG per tablet Take 2 tablets by mouth 2 times daily To prevent constipation while taking narcotic pain medication. Start with 1 tablet twice daily. If no bowel movement in 24 hours, increase to 2 tablets twice daily.  Discontinue if you have loose stools or when you are no longer taking narcotics. 30 tablet 0     simvastatin (ZOCOR) 20 MG tablet Take 1 tablet (20 mg) by mouth At Bedtime 90 tablet 3     tiZANidine (ZANAFLEX) 4 MG tablet Take 1 tablet (4 mg) by mouth 3 times daily 90 tablet      traZODone (DESYREL) 100 MG tablet Take 1 tablet (100 mg) by mouth At Bedtime 90 tablet 1     traZODone (DESYREL) 100 MG tablet TAKE 1 TABLET(100 MG) BY MOUTH AT BEDTIME 90 tablet 1     OTC products: None, except as noted above    Allergies   Allergen Reactions     Povidone Iodine      \"mild skin irritation\" per patient report     Toradol [Ketorolac] Other (See Comments)     Pt gets nightmares      Latex Allergy: NO    Social History   Substance Use Topics     Smoking status: Never Smoker     Smokeless tobacco: Never Used     Alcohol use No     History   Drug Use No       REVIEW OF SYSTEMS:   CONSTITUTIONAL: NEGATIVE for fever, chills, change in weight  INTEGUMENTARY/SKIN: NEGATIVE for worrisome rashes, moles or lesions  EYES: NEGATIVE for vision changes or irritation  ENT/MOUTH: NEGATIVE for ear, mouth and throat " "problems  RESP: NEGATIVE for significant cough or SOB  BREAST: NEGATIVE for masses, tenderness or discharge  CV: NEGATIVE for chest pain, palpitations or peripheral edema  GI: NEGATIVE for nausea, abdominal pain, heartburn, or change in bowel habits  : NEGATIVE for frequency, dysuria, or hematuria  MUSCULOSKELETAL: NEGATIVE for significant arthralgias or myalgia  NEURO: NEGATIVE for weakness, dizziness or paresthesias  ENDOCRINE: NEGATIVE for temperature intolerance, skin/hair changes  HEME: NEGATIVE for bleeding problems  PSYCHIATRIC: NEGATIVE for changes in mood or affect    EXAM:   /60 (BP Location: Right arm, Patient Position: Chair, Cuff Size: Adult Regular)  Pulse 64  Temp 98.6  F (37  C) (Oral)  Resp 14  Ht 5' 7\" (1.702 m)  Wt 238 lb (108 kg)  SpO2 98%  BMI 37.28 kg/m2    GENERAL APPEARANCE: healthy, alert and no distress     EYES: EOMI, PERRL     HENT: ear canals and TM's normal and nose and mouth without ulcers or lesions     NECK: no adenopathy, no asymmetry, masses, or scars and thyroid normal to palpation     RESP: lungs clear to auscultation - no rales, rhonchi or wheezes     CV: regular rates and rhythm, normal S1 S2, no S3 or S4 and no murmur, click or rub     ABDOMEN:  soft, nontender, no HSM or masses and bowel sounds normal     MS: extremities normal- no gross deformities noted, no evidence of inflammation in joints, FROM in all extremities.     SKIN: no suspicious lesions or rashes     NEURO: Normal strength and tone, sensory exam grossly normal, mentation intact and speech normal     PSYCH: mentation appears normal. and affect normal/bright     LYMPHATICS: No cervical adenopathy    DIAGNOSTICS:     EKG: appears normal, NSR, normal axis, normal intervals, no acute ST/T changes c/w ischemia, no LVH by voltage criteria, Right Bundle Branch Block, unchanged from previous tracings  Labs Drawn and in Process:   Unresulted Labs Ordered in the Past 30 Days of this Admission     Date and " Time Order Name Status Description    8/15/2018 1217 T4 FREE In process     8/15/2018 1217 TSH In process     8/15/2018 1217 HEMOGLOBIN A1C In process     8/15/2018 1217 COMPREHENSIVE METABOLIC PANEL In process         IMPRESSION:   Reason for surgery/procedure: Carpal tunnel release, right   Diagnosis/reason for consult: Pre-op risk assessment    The proposed surgical procedure is considered INTERMEDIATE risk.    REVISED CARDIAC RISK INDEX  The patient has the following serious cardiovascular risks for perioperative complications such as (MI, PE, VFib and 3  AV Block):  No serious cardiac risks  INTERPRETATION: 0 risks: Class I (very low risk - 0.4% complication rate)    The patient has the following additional risks for perioperative complications:  No identified additional risks  High tolerance to opioid analgesics due to chronic narcotic use      ICD-10-CM    1. Preop general physical exam Z01.818 EKG 12-lead complete w/read - Clinics     Comprehensive metabolic panel     Hemoglobin A1c     TSH     T4 free   2. Bilateral carpal tunnel syndrome G56.03    3. Moderate major depression (H) F32.1    4. Type 2 diabetes mellitus with diabetic nephropathy, without long-term current use of insulin (H) E11.21    5. Hypothyroidism due to acquired atrophy of thyroid E03.4    6. Suprapubic catheter (H) Z93.59    7. Spinal stenosis of lumbar region, unspecified whether neurogenic claudication present M48.061    8. Fibromyalgia M79.7    9. Hypertension goal BP (blood pressure) < 140/90 I10    10. Hyperlipidemia LDL goal <100 E78.5    11. Chronic narcotic dependence (H) F11.20        RECOMMENDATIONS:     --Consult hospital rounder / IM to assist post-op medical management    Pulmonary Risk  Incentive spirometry post op  Respiratory Therapy (Respiratory Care IP Consult)  post op  NG tube decompression if abdominal distension or significant vomiting       Obstructive Sleep Apnea (or suspected sleep apnea)  Patient is to bring  their home CPAP with them on the day of surgery      --Patient is to take all scheduled medications on the day of surgery EXCEPT for modifications listed below.    Diabetes Medication Use  -----Hold usual oral and non-insulin diabetic meds (e.g. Metformin, Actos, Glipizide) while NPO.       Anticoagulant or Antiplatelet Medication Use  ASPIRIN: Discontinue ASA 7-10 days prior to procedure to reduce bleeding risk.  It should be resumed post-operatively.        ACE Inhibitor or Angiotensin Receptor Blocker (ARB) Use  Ace inhibitor or Angiotensin Receptor Blocker (ARB) and should HOLD this medication for the 24 hours prior to surgery.      APPROVAL GIVEN to proceed with proposed procedure, without further diagnostic evaluation       Signed Electronically by: Shawnee Dueñas DO    Copy of this evaluation report is provided to requesting physician.    [facility] Preop Guidelines    Revised Cardiac Risk Index

## 2018-08-16 ENCOUNTER — MYC MEDICAL ADVICE (OUTPATIENT)
Dept: FAMILY MEDICINE | Facility: CLINIC | Age: 63
End: 2018-08-16

## 2018-08-16 LAB
ALBUMIN SERPL-MCNC: 3.5 G/DL (ref 3.4–5)
ALP SERPL-CCNC: 90 U/L (ref 40–150)
ALT SERPL W P-5'-P-CCNC: 27 U/L (ref 0–50)
ANION GAP SERPL CALCULATED.3IONS-SCNC: 7 MMOL/L (ref 3–14)
AST SERPL W P-5'-P-CCNC: 26 U/L (ref 0–45)
BILIRUB SERPL-MCNC: 0.3 MG/DL (ref 0.2–1.3)
BUN SERPL-MCNC: 16 MG/DL (ref 7–30)
CALCIUM SERPL-MCNC: 9.6 MG/DL (ref 8.5–10.1)
CHLORIDE SERPL-SCNC: 99 MMOL/L (ref 94–109)
CO2 SERPL-SCNC: 33 MMOL/L (ref 20–32)
CREAT SERPL-MCNC: 0.77 MG/DL (ref 0.52–1.04)
GFR SERPL CREATININE-BSD FRML MDRD: 76 ML/MIN/1.7M2
GLUCOSE SERPL-MCNC: 111 MG/DL (ref 70–99)
POTASSIUM SERPL-SCNC: 4.5 MMOL/L (ref 3.4–5.3)
PROT SERPL-MCNC: 7.2 G/DL (ref 6.8–8.8)
SODIUM SERPL-SCNC: 139 MMOL/L (ref 133–144)
T4 FREE SERPL-MCNC: 1.03 NG/DL (ref 0.76–1.46)
TSH SERPL DL<=0.005 MIU/L-ACNC: 0.28 MU/L (ref 0.4–4)

## 2018-08-16 ASSESSMENT — ASTHMA QUESTIONNAIRES: ACT_TOTALSCORE: 20

## 2018-08-16 NOTE — TELEPHONE ENCOUNTER
MP, please confirm TSH per [employer] message  TSH   Date Value Ref Range Status   08/15/2018 0.28 (L) 0.40 - 4.00 mU/L Final     Ekta Mccarthy RN, BSN  Message handled by Nurse Triage.

## 2018-08-17 NOTE — TELEPHONE ENCOUNTER
Duplicate form faxed again, called [facility], informed again, they will update again and send to urology  Ekta Mccarthy RN, BSN  Message handled by Nurse Triage.

## 2018-08-30 ENCOUNTER — TELEPHONE (OUTPATIENT)
Dept: UROLOGY | Facility: CLINIC | Age: 63
End: 2018-08-30

## 2018-08-30 ENCOUNTER — TRANSFERRED RECORDS (OUTPATIENT)
Dept: HEALTH INFORMATION MANAGEMENT | Facility: CLINIC | Age: 63
End: 2018-08-30

## 2018-08-30 DIAGNOSIS — N31.9 NEUROGENIC BLADDER: Primary | ICD-10-CM

## 2018-08-30 RX ORDER — CEFAZOLIN SODIUM 1 G/50ML
1 INJECTION, SOLUTION INTRAVENOUS SEE ADMIN INSTRUCTIONS
Status: CANCELLED | OUTPATIENT
Start: 2018-08-30 | End: 2019-08-30

## 2018-08-30 NOTE — TELEPHONE ENCOUNTER
Patient is scheduled for surgery with Dr. Rios      Spoke or left message with: Bhavani    Date of Surgery: 9/21/18    [facility] OR    Pre-op with surgeon (if applicable): n/a    H&P: Scheduled with pcp    Informed patient they will need an adult  yes    Additional imaging/appointments: n/a    Surgery packet: mailed 8/30/18    Additional comments: n/a

## 2018-09-10 ENCOUNTER — MYC MEDICAL ADVICE (OUTPATIENT)
Dept: UROLOGY | Facility: CLINIC | Age: 63
End: 2018-09-10

## 2018-09-10 DIAGNOSIS — N31.9 NEUROGENIC BLADDER: ICD-10-CM

## 2018-09-10 DIAGNOSIS — R30.0 DYSURIA: ICD-10-CM

## 2018-09-10 DIAGNOSIS — Z01.818 PRE-OP EXAM: Primary | ICD-10-CM

## 2018-09-13 DIAGNOSIS — R30.0 DYSURIA: ICD-10-CM

## 2018-09-13 DIAGNOSIS — Z01.818 PRE-OP EXAM: Primary | ICD-10-CM

## 2018-09-14 ENCOUNTER — TELEPHONE (OUTPATIENT)
Dept: FAMILY MEDICINE | Facility: CLINIC | Age: 63
End: 2018-09-14

## 2018-09-18 ENCOUNTER — TELEPHONE (OUTPATIENT)
Dept: FAMILY MEDICINE | Facility: CLINIC | Age: 63
End: 2018-09-18

## 2018-09-18 DIAGNOSIS — N31.9 NEUROGENIC BLADDER: ICD-10-CM

## 2018-09-18 DIAGNOSIS — R30.0 DYSURIA: ICD-10-CM

## 2018-09-18 DIAGNOSIS — Z01.818 PRE-OP EXAM: ICD-10-CM

## 2018-09-18 LAB
ALBUMIN UR-MCNC: NEGATIVE MG/DL
APPEARANCE UR: CLEAR
BACTERIA #/AREA URNS HPF: ABNORMAL /HPF
BILIRUB UR QL STRIP: NEGATIVE
COLOR UR AUTO: YELLOW
GLUCOSE UR STRIP-MCNC: NEGATIVE MG/DL
HGB UR QL STRIP: NEGATIVE
KETONES UR STRIP-MCNC: NEGATIVE MG/DL
LEUKOCYTE ESTERASE UR QL STRIP: ABNORMAL
NITRATE UR QL: POSITIVE
PH UR STRIP: 8.5 PH (ref 5–7)
RBC #/AREA URNS AUTO: ABNORMAL /HPF
SOURCE: ABNORMAL
SP GR UR STRIP: 1.01 (ref 1–1.03)
UROBILINOGEN UR STRIP-ACNC: 0.2 EU/DL (ref 0.2–1)
WBC #/AREA URNS AUTO: ABNORMAL /HPF

## 2018-09-18 PROCEDURE — 87186 SC STD MICRODIL/AGAR DIL: CPT | Performed by: UROLOGY

## 2018-09-18 PROCEDURE — 87088 URINE BACTERIA CULTURE: CPT | Performed by: UROLOGY

## 2018-09-18 PROCEDURE — 87086 URINE CULTURE/COLONY COUNT: CPT | Performed by: UROLOGY

## 2018-09-18 PROCEDURE — 81001 URINALYSIS AUTO W/SCOPE: CPT | Performed by: UROLOGY

## 2018-09-18 NOTE — TELEPHONE ENCOUNTER
Fax from [facility], informs pt filling albuterol inhaler regularly, ? Controller medication added, FYI only, routed FYI, fax in your bin  Ekta Mccarthy, RN, BSN  Message handled by Nurse Triage.

## 2018-09-19 DIAGNOSIS — N39.0 URINARY TRACT INFECTION: Primary | ICD-10-CM

## 2018-09-19 RX ORDER — SULFAMETHOXAZOLE/TRIMETHOPRIM 800-160 MG
1 TABLET ORAL 2 TIMES DAILY
Qty: 4 TABLET | Refills: 0 | Status: SHIPPED | OUTPATIENT
Start: 2018-09-19 | End: 2018-09-22

## 2018-09-20 ENCOUNTER — ANESTHESIA EVENT (OUTPATIENT)
Dept: SURGERY | Facility: AMBULATORY SURGERY CENTER | Age: 63
End: 2018-09-20

## 2018-09-20 LAB
BACTERIA SPEC CULT: ABNORMAL
BACTERIA SPEC CULT: ABNORMAL
SPECIMEN SOURCE: ABNORMAL

## 2018-09-21 ENCOUNTER — SURGERY (OUTPATIENT)
Age: 63
End: 2018-09-21

## 2018-09-21 ENCOUNTER — HOSPITAL ENCOUNTER (OUTPATIENT)
Facility: AMBULATORY SURGERY CENTER | Age: 63
End: 2018-09-21
Attending: UROLOGY
Payer: COMMERCIAL

## 2018-09-21 ENCOUNTER — ANESTHESIA (OUTPATIENT)
Dept: SURGERY | Facility: AMBULATORY SURGERY CENTER | Age: 63
End: 2018-09-21

## 2018-09-21 VITALS
RESPIRATION RATE: 16 BRPM | HEIGHT: 67 IN | TEMPERATURE: 98.4 F | DIASTOLIC BLOOD PRESSURE: 68 MMHG | OXYGEN SATURATION: 96 % | HEART RATE: 75 BPM | WEIGHT: 238 LBS | SYSTOLIC BLOOD PRESSURE: 99 MMHG | BODY MASS INDEX: 37.35 KG/M2

## 2018-09-21 RX ORDER — FENTANYL CITRATE 50 UG/ML
25-50 INJECTION, SOLUTION INTRAMUSCULAR; INTRAVENOUS
Status: DISCONTINUED | OUTPATIENT
Start: 2018-09-21 | End: 2018-09-21 | Stop reason: HOSPADM

## 2018-09-21 RX ORDER — LIDOCAINE 40 MG/G
CREAM TOPICAL
Status: DISCONTINUED | OUTPATIENT
Start: 2018-09-21 | End: 2018-09-21 | Stop reason: HOSPADM

## 2018-09-21 RX ORDER — FENTANYL CITRATE 50 UG/ML
25-50 INJECTION, SOLUTION INTRAMUSCULAR; INTRAVENOUS
Status: DISCONTINUED | OUTPATIENT
Start: 2018-09-21 | End: 2018-09-22 | Stop reason: HOSPADM

## 2018-09-21 RX ORDER — SODIUM CHLORIDE, SODIUM LACTATE, POTASSIUM CHLORIDE, CALCIUM CHLORIDE 600; 310; 30; 20 MG/100ML; MG/100ML; MG/100ML; MG/100ML
INJECTION, SOLUTION INTRAVENOUS CONTINUOUS
Status: DISCONTINUED | OUTPATIENT
Start: 2018-09-21 | End: 2018-09-21 | Stop reason: HOSPADM

## 2018-09-21 RX ORDER — ONDANSETRON 4 MG/1
4 TABLET, ORALLY DISINTEGRATING ORAL EVERY 30 MIN PRN
Status: DISCONTINUED | OUTPATIENT
Start: 2018-09-21 | End: 2018-09-22 | Stop reason: HOSPADM

## 2018-09-21 RX ORDER — PROPOFOL 10 MG/ML
INJECTION, EMULSION INTRAVENOUS CONTINUOUS PRN
Status: DISCONTINUED | OUTPATIENT
Start: 2018-09-21 | End: 2018-09-21

## 2018-09-21 RX ORDER — NALOXONE HYDROCHLORIDE 0.4 MG/ML
.1-.4 INJECTION, SOLUTION INTRAMUSCULAR; INTRAVENOUS; SUBCUTANEOUS
Status: DISCONTINUED | OUTPATIENT
Start: 2018-09-21 | End: 2018-09-22 | Stop reason: HOSPADM

## 2018-09-21 RX ORDER — OXYCODONE HYDROCHLORIDE 5 MG/1
5-10 TABLET ORAL EVERY 4 HOURS PRN
Status: DISCONTINUED | OUTPATIENT
Start: 2018-09-21 | End: 2018-09-22 | Stop reason: HOSPADM

## 2018-09-21 RX ORDER — ONDANSETRON 2 MG/ML
INJECTION INTRAMUSCULAR; INTRAVENOUS PRN
Status: DISCONTINUED | OUTPATIENT
Start: 2018-09-21 | End: 2018-09-21

## 2018-09-21 RX ORDER — KETAMINE HYDROCHLORIDE 10 MG/ML
INJECTION, SOLUTION INTRAMUSCULAR; INTRAVENOUS PRN
Status: DISCONTINUED | OUTPATIENT
Start: 2018-09-21 | End: 2018-09-21

## 2018-09-21 RX ORDER — SODIUM CHLORIDE, SODIUM LACTATE, POTASSIUM CHLORIDE, CALCIUM CHLORIDE 600; 310; 30; 20 MG/100ML; MG/100ML; MG/100ML; MG/100ML
INJECTION, SOLUTION INTRAVENOUS CONTINUOUS
Status: DISCONTINUED | OUTPATIENT
Start: 2018-09-21 | End: 2018-09-22 | Stop reason: HOSPADM

## 2018-09-21 RX ORDER — DEXAMETHASONE SODIUM PHOSPHATE 4 MG/ML
INJECTION, SOLUTION INTRA-ARTICULAR; INTRALESIONAL; INTRAMUSCULAR; INTRAVENOUS; SOFT TISSUE PRN
Status: DISCONTINUED | OUTPATIENT
Start: 2018-09-21 | End: 2018-09-21

## 2018-09-21 RX ORDER — GABAPENTIN 300 MG/1
300 CAPSULE ORAL ONCE
Status: COMPLETED | OUTPATIENT
Start: 2018-09-21 | End: 2018-09-21

## 2018-09-21 RX ORDER — DEXAMETHASONE SODIUM PHOSPHATE 4 MG/ML
4 INJECTION, SOLUTION INTRA-ARTICULAR; INTRALESIONAL; INTRAMUSCULAR; INTRAVENOUS; SOFT TISSUE EVERY 10 MIN PRN
Status: DISCONTINUED | OUTPATIENT
Start: 2018-09-21 | End: 2018-09-22 | Stop reason: HOSPADM

## 2018-09-21 RX ORDER — HYDROMORPHONE HYDROCHLORIDE 1 MG/ML
.3-.5 INJECTION, SOLUTION INTRAMUSCULAR; INTRAVENOUS; SUBCUTANEOUS EVERY 10 MIN PRN
Status: DISCONTINUED | OUTPATIENT
Start: 2018-09-21 | End: 2018-09-22 | Stop reason: HOSPADM

## 2018-09-21 RX ORDER — ALBUTEROL SULFATE 0.83 MG/ML
2.5 SOLUTION RESPIRATORY (INHALATION) EVERY 4 HOURS PRN
Status: DISCONTINUED | OUTPATIENT
Start: 2018-09-21 | End: 2018-09-21 | Stop reason: HOSPADM

## 2018-09-21 RX ORDER — MEPERIDINE HYDROCHLORIDE 25 MG/ML
12.5 INJECTION INTRAMUSCULAR; INTRAVENOUS; SUBCUTANEOUS
Status: DISCONTINUED | OUTPATIENT
Start: 2018-09-21 | End: 2018-09-22 | Stop reason: HOSPADM

## 2018-09-21 RX ORDER — ONDANSETRON 2 MG/ML
4 INJECTION INTRAMUSCULAR; INTRAVENOUS EVERY 30 MIN PRN
Status: DISCONTINUED | OUTPATIENT
Start: 2018-09-21 | End: 2018-09-22 | Stop reason: HOSPADM

## 2018-09-21 RX ORDER — ACETAMINOPHEN 325 MG/1
975 TABLET ORAL ONCE
Status: COMPLETED | OUTPATIENT
Start: 2018-09-21 | End: 2018-09-21

## 2018-09-21 RX ADMIN — ACETAMINOPHEN 975 MG: 325 TABLET ORAL at 09:52

## 2018-09-21 RX ADMIN — KETAMINE HYDROCHLORIDE 10 MG: 10 INJECTION, SOLUTION INTRAMUSCULAR; INTRAVENOUS at 10:21

## 2018-09-21 RX ADMIN — ONDANSETRON 4 MG: 2 INJECTION INTRAMUSCULAR; INTRAVENOUS at 10:14

## 2018-09-21 RX ADMIN — SODIUM CHLORIDE, SODIUM LACTATE, POTASSIUM CHLORIDE, CALCIUM CHLORIDE: 600; 310; 30; 20 INJECTION, SOLUTION INTRAVENOUS at 09:52

## 2018-09-21 RX ADMIN — GABAPENTIN 300 MG: 300 CAPSULE ORAL at 09:52

## 2018-09-21 RX ADMIN — DEXAMETHASONE SODIUM PHOSPHATE 4 MG: 4 INJECTION, SOLUTION INTRA-ARTICULAR; INTRALESIONAL; INTRAMUSCULAR; INTRAVENOUS; SOFT TISSUE at 10:14

## 2018-09-21 RX ADMIN — SODIUM CHLORIDE, SODIUM LACTATE, POTASSIUM CHLORIDE, CALCIUM CHLORIDE: 600; 310; 30; 20 INJECTION, SOLUTION INTRAVENOUS at 10:11

## 2018-09-21 RX ADMIN — OXYCODONE HYDROCHLORIDE 5 MG: 5 TABLET ORAL at 11:03

## 2018-09-21 RX ADMIN — KETAMINE HYDROCHLORIDE 10 MG: 10 INJECTION, SOLUTION INTRAMUSCULAR; INTRAVENOUS at 10:28

## 2018-09-21 RX ADMIN — KETAMINE HYDROCHLORIDE 5 MG: 10 INJECTION, SOLUTION INTRAMUSCULAR; INTRAVENOUS at 10:38

## 2018-09-21 RX ADMIN — PROPOFOL 100 MCG/KG/MIN: 10 INJECTION, EMULSION INTRAVENOUS at 10:14

## 2018-09-21 NOTE — ANESTHESIA POSTPROCEDURE EVALUATION
Patient: Bhavani White    Procedure(s):  Cystoscopy and Botox Injection Into the Bladder and suprapubic tube exchange - Wound Class: I-Clean   - Wound Class: II-Clean Contaminated    Diagnosis:Neurogenic Bladder  Diagnosis Additional Information: No value filed.    Anesthesia Type:  MAC    Note:  Anesthesia Post Evaluation    Patient location during evaluation: Phase 2  Patient participation: Able to fully participate in evaluation  Level of consciousness: awake and alert  Pain management: adequate  Airway patency: patent  Cardiovascular status: acceptable  Respiratory status: acceptable  Hydration status: acceptable  PONV: none     Anesthetic complications: None          Last vitals:  Vitals:    09/21/18 1104 09/21/18 1118 09/21/18 1148   BP: 130/74 99/68    Pulse: 81 75    Resp: 16 16    Temp:      SpO2: 94% (!) 88% 96%         Electronically Signed By: Romain Hewitt MD  September 21, 2018  12:05 PM

## 2018-09-21 NOTE — ANESTHESIA CARE TRANSFER NOTE
Patient: Bhavani White    Procedure(s):  Cystoscopy and Botox Injection Into the Bladder and suprapubic tube exchange - Wound Class: I-Clean   - Wound Class: II-Clean Contaminated    Diagnosis: Neurogenic Bladder  Diagnosis Additional Information: No value filed.    Anesthesia Type:   MAC     Note:  Airway :Room Air  Patient transferred to:Phase II  Comments: Patient awake and breathing spont. VSS. No complaints of pain or nausea. Report to RNHandoff Report: Identifed the Patient, Identified the Reponsible Provider, Reviewed the pertinent medical history, Discussed the surgical course, Reviewed Intra-OP anesthesia mangement and issues during anesthesia, Set expectations for post-procedure period and Allowed opportunity for questions and acknowledgement of understanding      Vitals: (Last set prior to Anesthesia Care Transfer)    CRNA VITALS  9/21/2018 1013 - 9/21/2018 1053      9/21/2018             Pulse: 78    SpO2: 97 %    Resp Rate (set): 10                Electronically Signed By: ELISSA Barker CRNA  September 21, 2018  10:53 AM

## 2018-09-21 NOTE — OP NOTE
PRE-OPERATIVE DIAGNOSIS:   1.  Neurogenic bladder    POST-OPERATIVE DIAGNOSIS:  1.  Neurogenic bladder    PROCEDURE:    1. Cystourethroscopy.   2. injection of botulinum toxin A 200units in 20cc sterile saline  3. Exchange of 24F suprapubic tube        SURGEON:  Yoandy Rios MD; available for the entire case, present for key portions of the procedure  FELLOW: Ayan Song MD  RESIDENT:  Khurram Villarreal MD  ANESTHESIA:  MAC    ESTIMATED BLOOD LOSS:  5 mL    DRAINS:  24F Suprapubic tube    SIGNIFICANT FINDINGS:     OPERATIVE INDICATIONS:  Bhavani White is a 62 year old female with neurogenic bladder. She elects to proceed with botulinum toxin injection understanding the risks for urinary retention, infection, pain, bleeding, need for future procedures and risks of anesthesia.     OPERATIVE DETAILS:  The patient was taken to the operating room in her usual state of health.   She was positioned in modified dorsal lithotomy with yellowfin stirrups.  Her suprapubic catheter was removed. Her genitalia were prepped and draped in the standard fashion. A time-out was performed to ensure proper patient, procedure and positioning.  The patient received appropriate IV antibiotics prior to the procedure.    A 21-Swedish Valdovinos injection cystoscope was placed into the bladder.  The bladder mucosa appeared to be normal without stones, tumors or diverticulum on 360 degree inspection. The ureteral orifices were in the normal orthotopic position bilaterally.  We mixed 2 vials of 100 U botulinum toxin A into 20 mL of injectable saline for a total of (10 units/mL).  We performed a template injection procedure with 5 columns of 4 injections. All injections were placed deep to the mucosa into the detrusor muscle.  We then emptied her bladder to re-inspect our injection sites and found that there was a minimal amount of blood. Her bladder was then emptied again. A new 24F suprapubic catheter was inserted into the bladder through the existing  tract. The balloon was inflated with 10cc sterile water. The patient was returned to supine position and transported to recovery in stable condition.      PLAN: F/u in 6 months for repeat botox

## 2018-09-21 NOTE — ANESTHESIA PREPROCEDURE EVALUATION
Anesthesia Evaluation     . Pt has had prior anesthetic.     No history of anesthetic complications          ROS/MED HX    ENT/Pulmonary:     (+)sleep apnea, Intermittent asthma Treatment: Inhaler prn and Nebulizer prn,  uses CPAP , . .    Neurologic:  - neg neurologic ROS     Cardiovascular:     (+) hypertension----. : . . . :. . Previous cardiac testing Echodate:results:4/2016: Interpretation Summary  REST ECG-NSR with mixed IVCD: iLBBB, RBBB  Left ventricular hypertrophy visualized by 2-D imaging.  REST Echo: EF >65%, prox septal thickening/LVH. No LV outflow obstruction at  rest. LIMITED IMAGE QUALITY THROUGHOUT STUDY DUE TO PATIENT BODY HABITUS.  THIS WILL REDUCE SENSITIVITY OF TEST FOR DIAGNOSIS OF CAD  Patient noted chest pain with dobutamine, but there were no ST segment  changes and the echo pictures show hyperdynamic LV with cavity obliteration,  EF>70% and no regional wall motion abnormalities. No ischemia identified on  this test. (limited image quality due to patient body habitus)date: results:ECG reviewed date: results:6/2017: SR, RBBB date: results:          METS/Exercise Tolerance:     Hematologic:     (+) Anemia, History of Transfusion -      Musculoskeletal:   (+) arthritis, , , -       GI/Hepatic:     (+) GERD (gastroparesis; no reflux symptoms after diet change to liquid) Other GI/Hepatic PUD      Renal/Genitourinary:  - ROS Renal section negative       Endo: Comment: Iatrogenic cushings - not on steroids      (+) type II DM Last HgA1c: 6.8 date: 2/2018 Not using insulin - not using insulin pump not previously admitted for DM/DKA thyroid problem hypothyroidism, Obesity, .      Psychiatric:     (+) psychiatric history anxiety and depression      Infectious Disease:   (+) MRSA, Other Infectious Disease frequent UTI      Malignancy:      - no malignancy   Other:    (+) No chance of pregnancy C-spine cleared: N/A, H/O Chronic Pain,H/O chronic opiod use , other significant disability Wheelchair  bound                   Physical Exam  Normal systems: dental    Airway   Mallampati: II  TM distance: >3 FB  Neck ROM: full    Dental     Cardiovascular       Pulmonary                     Anesthesia Plan      History & Physical Review  History and physical reviewed and following examination; no interval change.    ASA Status:  3 .    NPO Status:  > 6 hours    Plan for MAC with Intravenous and Propofol induction. Maintenance will be TIVA.  Reason for MAC:  Deep or markedly invasive procedure (G8)  PONV prophylaxis:  Ondansetron (or other 5HT-3)       Postoperative Care  Postoperative pain management:  IV analgesics, Oral pain medications and Multi-modal analgesia.      Consents  Anesthetic plan, risks, benefits and alternatives discussed with:  Patient..                History and physical assessed; Patient examined.   Risks and alternatives presented and discussed. Patient and family agree. All questions answered.      Romain Hewitt MD  Staff Anesthesiologist  *[phone]

## 2018-09-21 NOTE — DISCHARGE INSTRUCTIONS
"[facility]  Home Care Following Anesthesia  For 24 hours after surgery:  1. Get plenty of rest.  A responsible adult must stay with you for at least 24 hours after you leave the surgery center.  2. Do not drive or use heavy equipment.  If you have weakness or tingling, don't drive or use heavy equipment until this feeling goes away.   3. Do not drink alcohol.   4. Avoid strenuous or risky activities.  Ask for help when climbing stairs.  5. You may feel lightheaded.  IF so, sit for a few minutes before standing.  Have someone help you get up.   6. If you have nausea (feel sick to your stomach): Drink only clear liquids such as apple juice, ginger ale, broth or 7-Up.  Rest may also help.  Be sure to drink enough fluids.  Move to a regular diet as you feel able.   7. You may have a slight fever.  Call the doctor if your fever is over 100 F (37.7 C) (taken under the tongue) or lasts longer than 24 hours.  8. You may have a dry mouth, a sore throat, muscle aches or trouble sleeping. These should go away after 24 hours.  9. Do not make important or legal decisions.        Today you received a Marcaine or bupivacaine block to numb the nerves near your surgery site.  This is a block using local anesthetic or \"numbing\" medication injected around the nerves to anesthetize or \"numb\" the area supplied by those nerves.  This block is injected into the muscle layer near your surgical site.  The medication may numb the location where you had surgery for 6-18 hours, but may last up to 24 hours.  If your surgical site is an arm or leg you should be careful with your affected limb, since it is possible to injure your limb without being aware of it due to the numbing.  Until full feeling returns, you should guard against bumping or hitting your limb, and avoid extreme hot or cold temperatures on the skin.  As the block wears off, the feeling will return as a tingling or prickly sensation near your " surgical site.  You will experience more discomfort from your incision as the feeling returns.  You may want to take a pain pill (a narcotic or Tylenol if this was prescribed by your surgeon) when you start to experience mild pain before the pain beccomes more severe.  If your pain medications do not control your pain you should notifiy your surgeon.    Tips for taking pain medications  To get the best pain relief possible, remember these points:    Take pain medications as directed, before pain becomes severe.    Pain medication can upset your stomach: taking it with food may help.    Constipation is a common side effect of pain medication. Drink plenty of  fluids.    Eat foods high in fiber. Take a stool softener if recommended by your doctor or pharmacist.    Do not drink alcohol, drive or operate machinery while taking pain medications.    Ask about other ways to control pain, such as with heat, ice or relaxation.    Tylenol/Acetaminophen Consumption  To help encourage the safe use of acetaminophen, the makers of TYLENOL  have lowered the maximum daily dose for single-ingredient Extra Strength TYLENOL  (acetaminophen) products sold in the [place] from 8 pills per day (4,000 mg) to 6 pills per day (3,000 mg). The dosing interval has also changed from 2 pills every 4-6 hours to 2 pills every 6 hours.    If you feel your pain relief is insufficient, you may take Tylenol/Acetaminophen in addition to your narcotic pain medication.     Be careful not to exceed 3,000 mg of Tylenol/Acetaminophen in a 24 hour period from all sources.    If you are taking extra strength Tylenol/acetaminophen (500 mg), the maximum dose is 6 tablets in 24 hours.    If you are taking regular strength acetaminophen (325 mg), the maximum dose is 9 tablets in 24 hours.    Call a doctor for any of the followin. Signs of infection (fever, growing tenderness at the surgery site, a large amount of drainage or bleeding, severe pain, foul-smelling  drainage, redness, swelling).  2. It has been over 8 to 10 hours since surgery and you are still not able to urinate (pass water).  3. Headache for over 24 hours.  4. Numbness, tingling or weakness the day after surgery (if you had spinal anesthesia).  Your doctor is:  Dr. Yoandy Lisa, Prostate and Urology: [phone]  Or dial [phone] and ask for the resident on call for:  Prostate Urology  For emergency care, call the:  [place] [facility]:  [phone] (TTY for hearing impaired: [phone])

## 2018-09-21 NOTE — IP AVS SNAPSHOT
[facility]    [address] [place] [address]    Phone:  [phone]    Fax:  [phone]                                       After Visit Summary   9/21/2018    Bhavani White    MRN: [id number]           After Visit Summary Signature Page     I have received my discharge instructions, and my questions have been answered. I have discussed any challenges I see with this plan with the nurse or doctor.    ..........................................................................................................................................  Patient/Patient Representative Signature      ..........................................................................................................................................  Patient Representative Print Name and Relationship to Patient    ..................................................               ................................................  Date                                   Time    ..........................................................................................................................................  Reviewed by Signature/Title    ...................................................              ..............................................  Date                                               Time          22EPIC Rev 08/18

## 2018-09-21 NOTE — OR NURSING
Patient satting 88% on RA on occasion.  No SOB or wheezing.  Patient awake and alert.  Worked on deep breathing and performed IS x 20.  1500 mL.  Given home albuterol inhaler 2 puffs.  Recheck before discharge 96%. Patient comfortable being discharged.

## 2018-09-22 DIAGNOSIS — N32.89 BLADDER SPASMS: Primary | ICD-10-CM

## 2018-09-22 DIAGNOSIS — N30.00 ACUTE CYSTITIS WITHOUT HEMATURIA: ICD-10-CM

## 2018-09-22 RX ORDER — SULFAMETHOXAZOLE/TRIMETHOPRIM 800-160 MG
1 TABLET ORAL 2 TIMES DAILY
Qty: 10 TABLET | Refills: 0 | Status: SHIPPED | OUTPATIENT
Start: 2018-09-22 | End: 2019-03-11

## 2018-09-22 RX ORDER — SOLIFENACIN SUCCINATE 5 MG/1
5 TABLET, FILM COATED ORAL DAILY
Qty: 15 TABLET | Refills: 0 | Status: SHIPPED | OUTPATIENT
Start: 2018-09-22 | End: 2019-03-11

## 2018-09-24 ENCOUNTER — TELEPHONE (OUTPATIENT)
Dept: UROLOGY | Facility: CLINIC | Age: 63
End: 2018-09-24

## 2018-09-24 ENCOUNTER — CARE COORDINATION (OUTPATIENT)
Dept: UROLOGY | Facility: CLINIC | Age: 63
End: 2018-09-24

## 2018-09-24 NOTE — TELEPHONE ENCOUNTER
Left message for [facility] home care nurse that it's okay for her to change patients catheter to the silver alloy 24 [place] catheter that she typically uses.    Jaki Dimas, THANG  Care Coordinator- Reconstructive Urology

## 2018-09-24 NOTE — TELEPHONE ENCOUNTER
Health Call Center    Phone Message    May a detailed message be left on voicemail: yes, Yisel Sherrie Home Care Nurse [phone]    Reason for Call: Order(s): Home Care Orders: Other: catheter change to silver coated 24 Citizen of [place] as used in the past.  Apparently a different catheter was placed after procedure 9/21 and it's bothering the patient.  PLEASE RESPOND THIS MORNING. VISIT SCHEDULED FOR EARLY AFTERNOON.    Action Taken: Message routed to:  [facility] ([facility] Urology

## 2018-09-27 ENCOUNTER — TELEPHONE (OUTPATIENT)
Dept: UROLOGY | Facility: CLINIC | Age: 63
End: 2018-09-27

## 2018-09-27 LAB
ALBUMIN UR-MCNC: 30 MG/DL
AMORPH CRY #/AREA URNS HPF: ABNORMAL /HPF
APPEARANCE UR: ABNORMAL
BACTERIA #/AREA URNS HPF: ABNORMAL /HPF
BILIRUB UR QL STRIP: NEGATIVE
COLOR UR AUTO: YELLOW
GLUCOSE UR STRIP-MCNC: NEGATIVE MG/DL
HGB UR QL STRIP: ABNORMAL
KETONES UR STRIP-MCNC: NEGATIVE MG/DL
LEUKOCYTE ESTERASE UR QL STRIP: ABNORMAL
MUCOUS THREADS #/AREA URNS LPF: PRESENT /LPF
NITRATE UR QL: POSITIVE
PH UR STRIP: 7 PH (ref 5–7)
RBC #/AREA URNS AUTO: 31 /HPF (ref 0–2)
SOURCE: ABNORMAL
SP GR UR STRIP: 1.02 (ref 1–1.03)
SQUAMOUS #/AREA URNS AUTO: <1 /HPF (ref 0–1)
UROBILINOGEN UR STRIP-MCNC: 0 MG/DL (ref 0–2)
WBC #/AREA URNS AUTO: 11 /HPF (ref 0–5)

## 2018-09-27 PROCEDURE — 87086 URINE CULTURE/COLONY COUNT: CPT

## 2018-09-27 PROCEDURE — 87088 URINE BACTERIA CULTURE: CPT

## 2018-09-27 PROCEDURE — 87186 SC STD MICRODIL/AGAR DIL: CPT

## 2018-09-27 PROCEDURE — 81001 URINALYSIS AUTO W/SCOPE: CPT | Performed by: UROLOGY

## 2018-09-27 NOTE — TELEPHONE ENCOUNTER
Left message with home care nurse to call back to discuss UA/[facility] orders.    Jaki Dimas, RN, BSN  Care Coordinator- Reconstructive Urology

## 2018-09-27 NOTE — TELEPHONE ENCOUNTER
Health Call Center    Phone Message    May a detailed message be left on voicemail: yes    Reason for Call: Order(s): Home Care Orders: Skilled Nursing:  [facility] PHN is at patient home and changing catheter, will draw UA as per patient request but needs orders for same .  Verbal order and ? If standing order would be appropriate action.    Action Taken: Message routed to:  [facility] ([facility]): urology/ mary kay

## 2018-09-30 LAB
BACTERIA SPEC CULT: ABNORMAL
Lab: ABNORMAL
SPECIMEN SOURCE: ABNORMAL

## 2018-10-01 NOTE — H&P
Urology Admission History and Physical    Name: Bhavani White    MRN: [id number]   YOB: 1955               Chief Complaint:   Neurogenic bladder    History is obtained from the patient          History of Present Illness:   Bhavani White is a 62 year old female with neurogenic bladder managed with botox. She is here for routine botox therapy.          Past Medical History:     Past Medical History:   Diagnosis Date     Anxiety      Asthma      Depression      DM (diabetes mellitus) (H)      Frequent UTI      History of blood transfusion      History of ulcer disease 2014    She notes from NSAIDs; nearly . Discussed a hospitalization at [facility] for this.     Hypertension      Hypothyroid      Iatrogenic Cushing's disease (H)     off prednisone now     Morbid obesity (H)      MRSA (methicillin resistant staph aureus) culture positive 2012    repeat cx 10/12/2012 no staph mentioned.     Osteoarthritis     multiple joings.     Other chronic pain      Sleep apnea     No longer uses cpap.            Past Surgical History:     Past Surgical History:   Procedure Laterality Date     ABDOMEN SURGERY       C LAMINECTOMY,FACETECTOMY,LUMBAR  1982     C TOTAL KNEE ARTHROPLASTY      left     CYSTOSCOPY N/A 2018    Procedure: CYSTOSCOPY;  Cystoscopy and Botox Injection Into the Bladder and suprapubic tube exchange;  Surgeon: Yoandy Rios MD;  [facility]     CYSTOSCOPY, INTRAVESICAL INJECTION N/A 2017    Procedure: CYSTOSCOPY, INTRAVESICAL INJECTION;  Cystoscopy, Botox Injection Into The Bladder  Latex Allergy ;  Surgeon: Yoandy Rios MD;  [facility]     CYSTOSCOPY, INTRAVESICAL INJECTION N/A 3/8/2018    Procedure: CYSTOSCOPY, INTRAVESICAL INJECTION;  Cystoscopy, Botox Injection Into The Bladder and suprapubic catheter exchange;  Surgeon: Yoandy Rios MD;  [facility]: [facility] OR     DISCECTOMY, FUSION CERVICAL ANTERIOR THREE+ LEVELS, COMBINED Left  "5/3/2016    Procedure: COMBINED DISCECTOMY, FUSION CERVICAL ANTERIOR THREE+ LEVELS;  Surgeon: Jasvir Torres MD;  [facility]: [facility] OR     GENITOURINARY SURGERY      suprapubic catheter     HERNIA REPAIR  1957    double hernia     HYSTERECTOMY, PAP NO LONGER INDICATED      CELIA and large ovarian tumor removed     INJECT BOTOX N/A 9/21/2018    Procedure: INJECT BOTOX;;  Surgeon: Yoandy Rios MD;  [facility]: [facility]     SURGICAL HISTORY OF -       left cataract surgery     SURGICAL HISTORY OF -   12/14    right cataract surgery and left laser revision     SURGICAL HISTORY OF -   March 2015    left carpal tunnel release     TONSILLECTOMY  1974            Social History:     Social History   Substance Use Topics     Smoking status: Never Smoker     Smokeless tobacco: Never Used     Alcohol use No            Family History:     Family History   Problem Relation Age of Onset     Depression Son      Hypertension Mother      HEART DISEASE Mother      bypass     Depression Mother      Hypertension Father      Connective Tissue Disorder Father      ankylosing spondylitis     Cancer Father      colon; prostate     Other Cancer Father      bladder cancer     Depression Sister             Allergies:     Allergies   Allergen Reactions     Povidone Iodine      \"mild skin irritation\" per patient report     Toradol [Ketorolac] Other (See Comments)     Pt gets nightmares            Medications:     Current Outpatient Prescriptions   Medication Sig     albuterol (PROAIR HFA/PROVENTIL HFA/VENTOLIN HFA) 108 (90 Base) MCG/ACT Inhaler Inhale 2 puffs into the lungs every 6 hours as needed for shortness of breath / dyspnea or wheezing     aspirin 81 MG tablet Take by mouth daily     DULoxetine HCl (CYMBALTA PO) Take 120 mg by mouth daily      gabapentin (NEURONTIN) 600 MG tablet 1,200 mg 3 times daily      levothyroxine (SYNTHROID/LEVOTHROID) 88 MCG tablet TAKE 1 TABLET BY MOUTH EVERY DAY     lisinopril (PRINIVIL/ZESTRIL) 2.5 MG " tablet TAKE 1 TABLET(2.5 MG) BY MOUTH DAILY     metFORMIN (GLUCOPHAGE-XR) 500 MG 24 hr tablet Take 1 tablet (500 mg) by mouth 2 times daily (with meals)     methadone (DOLOPHINE) 10 MG tablet Take 2 pills TID     MULTIVITAMIN OR 1 a day     nystatin (MYCOSTATIN) 613176 UNIT/GM POWD Apply topically 3 times daily as needed     senna-docusate (SENOKOT-S;PERICOLACE) 8.6-50 MG per tablet Take 2 tablets by mouth 2 times daily     simvastatin (ZOCOR) 20 MG tablet Take 1 tablet (20 mg) by mouth At Bedtime     tiZANidine (ZANAFLEX) 4 MG tablet Take 1 tablet (4 mg) by mouth 3 times daily     traZODone (DESYREL) 100 MG tablet TAKE 1 TABLET(100 MG) BY MOUTH AT BEDTIME     acetaminophen (TYLENOL) 325 MG tablet Take 1-2 tablets (325-650 mg) by mouth every 6 hours as needed for other (mild pain)     albuterol (2.5 MG/3ML) 0.083% neb solution Take 1 vial (2.5 mg) by nebulization every 4 hours as needed for shortness of breath / dyspnea or wheezing     blood glucose (NO BRAND SPECIFIED) lancets standard Use to test blood sugar 1 times daily or as directed.     blood glucose monitoring (NO BRAND SPECIFIED) meter device kit Use to test blood sugar 1 times daily or as directed.     blood glucose monitoring (NO BRAND SPECIFIED) test strip Use to test blood sugars 1 times daily or as directed     CLONIDINE HCL PO Take 0.1 mg by mouth 3 times daily as needed     MIRALAX OR 1 capful daily prn     ondansetron (ZOFRAN ODT) 4 MG ODT tab Take 1-2 tablets (4-8 mg) by mouth every 8 hours as needed for nausea     order for DME Equipment being ordered: Nebulizer     order for DME Equipment being ordered: BIPAP.   15/5, Rate of 12, 2L O2 to keep sats 90-95%.     order for DME Equipment being ordered: Nebulizer     order for DME Equipment being ordered: Hospital Bed, total electric (head, foot, and height adjustments), with any type side rails, with mattress     order for DME Equipment being ordered: Motorized Wheelchair     order for DME Equipment  being ordered: Trapeze bar for hospital bed     oxybutynin (DITROPAN XL) 10 MG 24 hr tablet Take 1 tablet (10 mg) by mouth daily     Phenazopyridine HCl (PYRIDIUM PO) Take 2 tablets by mouth 3 times daily as needed Dose unknown     senna-docusate (SENOKOT-S;PERICOLACE) 8.6-50 MG per tablet Take 2 tablets by mouth 2 times daily To prevent constipation while taking narcotic pain medication. Start with 1 tablet twice daily. If no bowel movement in 24 hours, increase to 2 tablets twice daily.  Discontinue if you have loose stools or when you are no longer taking narcotics.     solifenacin (VESICARE) 5 MG tablet Take 1 tablet (5 mg) by mouth daily     traZODone (DESYREL) 100 MG tablet Take 1 tablet (100 mg) by mouth At Bedtime     No current facility-administered medications for this encounter.              Review of Systems:    ROS: 10 point ROS neg other than the symptoms noted above in the HPI           Physical Exam:   VS:  T: 98.4    HR: 75    BP: 99/68    RR: 16   GEN:  AOx3.  NAD.    CV:  RRR  LUNGS: Non-labored breathing.   BACK:  No midline or CVA tenderness.  ABD:  Soft.  NT.  ND.  No rebound or guarding.  No masses..  EXT:  Warm, well perfused.  no edema.  SKIN:  Warm.  Dry.  No rashes.          Data:   All laboratory data reviewed:    No lab results found in last 7 days.  No lab results found in last 7 days.           Impression and Plan:   Impression:   Bhavani White is a 62 year old female with neurogenic bladder managed with botox      Plan:    To OR today for botox injection  Risks/benefits/alternatives discussed with patient. Informed consent obtained.    This patient's exam findings, labs, and imaging discussed with urology staff surgeon, Dr. Rios, who developed the treatment plan.    Ayan Song MD

## 2018-10-08 DIAGNOSIS — N39.0 URINARY TRACT INFECTION: Primary | ICD-10-CM

## 2018-10-08 RX ORDER — NITROFURANTOIN 25; 75 MG/1; MG/1
100 CAPSULE ORAL 2 TIMES DAILY
Qty: 10 CAPSULE | Refills: 0 | Status: SHIPPED | OUTPATIENT
Start: 2018-10-08 | End: 2018-12-13

## 2018-10-15 ENCOUNTER — TELEPHONE (OUTPATIENT)
Dept: FAMILY MEDICINE | Facility: CLINIC | Age: 63
End: 2018-10-15

## 2018-10-15 NOTE — TELEPHONE ENCOUNTER
Reason for Call:  Form, our goal is to have forms completed with 72 hours, however, some forms may require a visit or additional information.    Type of letter, form or note:  Orders    Who is the form from?: [facility] (if other please explain)    Where did the form come from: form was faxed in    What clinic location was the form placed at?: [facility]     Where the form was placed: 's Box    What number is listed as a contact on the form?: [phone]       Additional comments: Please sign and fax back [phone]    Call taken on 10/15/2018 at 9:40 AM by Celi Sauceda

## 2018-11-08 DIAGNOSIS — E11.21 TYPE 2 DIABETES MELLITUS WITH DIABETIC NEPHROPATHY (H): ICD-10-CM

## 2018-11-08 DIAGNOSIS — E78.5 HYPERLIPIDEMIA LDL GOAL <100: Primary | ICD-10-CM

## 2018-11-08 NOTE — LETTER
November 13, 2018      Bhavani White  [address] [facility] [place] [address]  [place] [address]        Dear Bhavani,     We recently received a call from your pharmacy requesting a refill of your medication (metformin).    A review of your chart indicates that an appointment is required.  Please contact our office at [phone] to schedule your doctor's appointment. Please come fasting as you are due for your annual cholesterol.     We have authorized one refill of your medication to allow time for you to schedule your appointment.    Taking care of your health is important to us and ongoing visits with your provider are vital to your care.  We look forward to seeing you in the near future.        Sincerely,        Shawnee Dueñas DO/Ekta DESIR

## 2018-11-08 NOTE — TELEPHONE ENCOUNTER
"Requested Prescriptions   Pending Prescriptions Disp Refills     metFORMIN (GLUCOPHAGE-XR) 500 MG 24 hr tablet [Pharmacy Med Name: METFORMIN ER 500MG 24HR TABS]    Last Written Prescription Date: 12/14/17  Last Fill Quantity: 360 tablets,  # refills: 0   Last office visit: 8/15/2018 with prescribing provider:  Spenser   Future Office Visit:     360 tablet 0     Sig: TAKE 1 TABLET BY MOUTH TWICE DAILY WITH MEALS    Biguanide Agents Failed    11/8/2018  3:46 AM       Failed - Patient has documented LDL within the past 12 mos.    Recent Labs   Lab Test  11/01/17   1535   LDL  59            Passed - Blood pressure less than 140/90 in past 6 months    BP Readings from Last 3 Encounters:   09/21/18 99/68   08/15/18 110/60   05/21/18 128/82                Passed - Patient has had a Microalbumin in the past 15 mos.    Recent Labs   Lab Test  11/01/17   1535   MICROL  36   UMALCR  35.15*            Passed - Patient is age 10 or older       Passed - Patient has documented A1c within the specified period of time.    If HgbA1C is 8 or greater, it needs to be on file within the past 3 months.  If less than 8, must be on file within the past 6 months.     Recent Labs   Lab Test  08/15/18   1224   A1C  6.0*            Passed - Patient's CR is NOT>1.4 OR Patient's EGFR is NOT<45 within past 12 mos.    Recent Labs   Lab Test  08/15/18   1224   GFRESTIMATED  76   GFRESTBLACK  >90       Recent Labs   Lab Test  08/15/18   1224   CR  0.77            Passed - Patient does NOT have a diagnosis of CHF.       Passed - Patient is not pregnant       Passed - Patient has not had a positive pregnancy test within the past 12 mos.        Passed - Recent (6 mo) or future (30 days) visit within the authorizing provider's specialty    Patient had office visit in the last 6 months or has a visit in the next 30 days with authorizing provider or within the authorizing provider's specialty.  See \"Patient Info\" tab in inbasket, or \"Choose Columns\" in Meds " & Orders section of the refill encounter.

## 2018-11-09 RX ORDER — METFORMIN HCL 500 MG
TABLET, EXTENDED RELEASE 24 HR ORAL
Qty: 360 TABLET | Refills: 0 | Status: SHIPPED | OUTPATIENT
Start: 2018-11-09 | End: 2019-07-19

## 2018-11-09 NOTE — TELEPHONE ENCOUNTER
Routing refill request to provider to review approval because:    DUE FOR ANNUAL LIPIDS, f/u appointment recommended Return in about 6 months (around 2/15/2019).   Ok for lab only now? Route to inform pt if lab only ok  Lab Results   Component Value Date    A1C 6.0 08/15/2018    A1C 6.8 02/15/2018     Recent Labs   Lab Test  11/01/17   1535  11/18/16   1132   12/12/14   1345  06/26/13   CHOL  159  147   < >  133   --   156   HDL  54  48*   < >  58   --   43   LDL  59  64   < >  32   < >  75   TRIG  229*  174*   < >  216*   --   192*   CHOLHDLRATIO   --    --    --   2.3   --   3.63    < > = values in this interval not displayed.     BP Readings from Last 2 Encounters:   09/21/18 99/68   08/15/18 110/60     Ekta Mccarthy, RN, BSN  Message handled by Nurse Triage.

## 2018-11-12 DIAGNOSIS — G47.00 INSOMNIA: ICD-10-CM

## 2018-11-12 NOTE — TELEPHONE ENCOUNTER
"Requested Prescriptions   Pending Prescriptions Disp Refills     traZODone (DESYREL) 100 MG tablet [Pharmacy Med Name: TRAZODONE 100MG TABLETS]  Last Written Prescription Date:  5/18/2018  Last Fill Quantity: 90 tablet,  # refills: 1   Last office visit: 8/15/2018 with prescribing provider:  Spenser   Future Office Visit:     90 tablet 0     Sig: TAKE 1 TABLET(100 MG) BY MOUTH AT BEDTIME    Serotonin Modulators Passed    11/12/2018 10:06 AM       Passed - Recent (12 mo) or future (30 days) visit within the authorizing provider's specialty    Patient had office visit in the last 12 months or has a visit in the next 30 days with authorizing provider or within the authorizing provider's specialty.  See \"Patient Info\" tab in inbasket, or \"Choose Columns\" in Meds & Orders section of the refill encounter.             Passed - Patient is age 18 or older       Passed - No active pregnancy on record       Passed - No positive pregnancy test in past 12 months          "

## 2018-11-13 ENCOUNTER — MYC MEDICAL ADVICE (OUTPATIENT)
Dept: UROLOGY | Facility: CLINIC | Age: 63
End: 2018-11-13

## 2018-11-13 ENCOUNTER — TELEPHONE (OUTPATIENT)
Dept: UROLOGY | Facility: CLINIC | Age: 63
End: 2018-11-13

## 2018-11-13 RX ORDER — TRAZODONE HYDROCHLORIDE 100 MG/1
TABLET ORAL
Qty: 90 TABLET | Refills: 1 | Status: SHIPPED | OUTPATIENT
Start: 2018-11-13 | End: 2019-03-11

## 2018-11-13 NOTE — TELEPHONE ENCOUNTER
I spoke to Yisel from [facility] to let her know that we cannot change the order from 4-6 weeks to 7 weeks. We usually change catheter every 4 weeks.(per Jaki Dimas RNCARMEN Morillo) agreed with the plan.      Papa Hollis MA

## 2018-11-13 NOTE — TELEPHONE ENCOUNTER
Health Call Center    Phone Message    May a detailed message be left on voicemail: yes    Reason for Call: Other: Yisel RN with  homecare has a order to change the Pt's catheter every 4-6 weeks and the Pt is requesting to have it changed at 7 weeks. Yisel needs an OK from Dr. Rios and or one of his RN's that it is ok to comply with the Pt's request. Please call Yisel back.     Action Taken: Message routed to:  [facility]

## 2018-11-14 NOTE — TELEPHONE ENCOUNTER
Health Call Center    Phone Message    May a detailed message be left on voicemail: yes    Reason for Call: Other: Pts home care nurse, Yisel, calling. She states that pt is insistent that she get her catheter changed on Monday as she will be out of town. Yisel stated that she's not sure that's really the reason but wanted to let the clinic know that she'll be changing it on 11/19 per the pts request but that she's also aware that it goes against the orders she has.     Action Taken: Message routed to:  [facility]

## 2018-11-21 ENCOUNTER — TELEPHONE (OUTPATIENT)
Dept: FAMILY MEDICINE | Facility: CLINIC | Age: 63
End: 2018-11-21

## 2018-11-21 ENCOUNTER — OFFICE VISIT (OUTPATIENT)
Dept: URGENT CARE | Facility: URGENT CARE | Age: 63
End: 2018-11-21
Payer: COMMERCIAL

## 2018-11-21 ENCOUNTER — TELEPHONE (OUTPATIENT)
Dept: UROLOGY | Facility: CLINIC | Age: 63
End: 2018-11-21

## 2018-11-21 VITALS
HEART RATE: 76 BPM | RESPIRATION RATE: 18 BRPM | SYSTOLIC BLOOD PRESSURE: 120 MMHG | DIASTOLIC BLOOD PRESSURE: 70 MMHG | HEIGHT: 67 IN | OXYGEN SATURATION: 93 % | BODY MASS INDEX: 37.28 KG/M2 | TEMPERATURE: 98.7 F

## 2018-11-21 DIAGNOSIS — R30.0 DYSURIA: ICD-10-CM

## 2018-11-21 DIAGNOSIS — N39.0 ACUTE URINARY TRACT INFECTION: Primary | ICD-10-CM

## 2018-11-21 DIAGNOSIS — R82.90 NONSPECIFIC FINDING ON EXAMINATION OF URINE: ICD-10-CM

## 2018-11-21 DIAGNOSIS — N39.0 URINARY TRACT INFECTION: Primary | ICD-10-CM

## 2018-11-21 LAB
BACTERIA #/AREA URNS HPF: ABNORMAL /HPF
RBC #/AREA URNS AUTO: ABNORMAL /HPF
WBC #/AREA URNS AUTO: ABNORMAL /HPF

## 2018-11-21 PROCEDURE — 99213 OFFICE O/P EST LOW 20 MIN: CPT | Performed by: PHYSICIAN ASSISTANT

## 2018-11-21 PROCEDURE — 87186 SC STD MICRODIL/AGAR DIL: CPT | Performed by: PHYSICIAN ASSISTANT

## 2018-11-21 PROCEDURE — 87086 URINE CULTURE/COLONY COUNT: CPT | Performed by: PHYSICIAN ASSISTANT

## 2018-11-21 PROCEDURE — 87088 URINE BACTERIA CULTURE: CPT | Performed by: PHYSICIAN ASSISTANT

## 2018-11-21 PROCEDURE — 81015 MICROSCOPIC EXAM OF URINE: CPT | Performed by: PHYSICIAN ASSISTANT

## 2018-11-21 RX ORDER — SULFAMETHOXAZOLE/TRIMETHOPRIM 800-160 MG
1 TABLET ORAL 2 TIMES DAILY
Qty: 10 TABLET | Refills: 0 | Status: SHIPPED | OUTPATIENT
Start: 2018-11-21 | End: 2019-03-11

## 2018-11-21 NOTE — TELEPHONE ENCOUNTER
[facility]    Phone Message    May a detailed message be left on voicemail: yes    Reason for Call: Order(s): Other:   Reason for requested: Orders for 2 skilled nursing home visits over the next 60 days and also 2 PRN. OK to leave Verbal orders in .  Date needed: As soon as possible  Provider name: Dr. Rios      Action Taken: Message routed to:  [facility] Urology

## 2018-11-21 NOTE — TELEPHONE ENCOUNTER
Yisel with home care calls, verbal order provided for flu shot at 11/26/18 visit, last flu shot >1 year, RIKA Mccarthy, RN, BSN  Message handled by Nurse Triage.

## 2018-11-22 NOTE — PROGRESS NOTES
SUBJECTIVE:   Bhavani White is a 63 year old female presenting with a chief complaint of   Chief Complaint   Patient presents with     UTI       She is an established patient of [facility].    UTI    Onset of symptoms was 2 day(s) ago.  Course of illness is worsening  Severity moderate  Current and associated symptoms dysuria, burning and low grade fever  Treatment and measures tried Increase fluid intake, azo OTC  Predisposing factors include history of frequent UTI's, she has a suprapubic catheter  Patient denies rigors, flank pain, temperature > 101 degrees F. and vomiting and taking coumadin.          Review of Systems   Constitutional: Positive for fever (low grade). Negative for chills.   Gastrointestinal: Positive for nausea. Negative for diarrhea and vomiting.   Genitourinary: Positive for dysuria. Negative for frequency and hematuria.       Past Medical History:   Diagnosis Date     Anxiety      Asthma      Depression      DM (diabetes mellitus) (H)      Frequent UTI      History of blood transfusion      History of ulcer disease 2014    She notes from NSAIDs; nearly . Discussed a hospitalization at [facility] for this.     Hypertension      Hypothyroid      Iatrogenic Cushing's disease (H)     off prednisone now     Morbid obesity (H)      MRSA (methicillin resistant staph aureus) culture positive 2012    repeat cx 10/12/2012 no staph mentioned.     Osteoarthritis     multiple joings.     Other chronic pain      Sleep apnea     No longer uses cpap.     Family History   Problem Relation Age of Onset     Depression Son      Hypertension Mother      HEART DISEASE Mother      bypass     Depression Mother      Hypertension Father      Connective Tissue Disorder Father      ankylosing spondylitis     Cancer Father      colon; prostate     Other Cancer Father      bladder cancer     Depression Sister      Current Outpatient Prescriptions   Medication Sig Dispense Refill     acetaminophen (TYLENOL)  325 MG tablet Take 1-2 tablets (325-650 mg) by mouth every 6 hours as needed for other (mild pain) 25 tablet 0     albuterol (2.5 MG/3ML) 0.083% neb solution Take 1 vial (2.5 mg) by nebulization every 4 hours as needed for shortness of breath / dyspnea or wheezing 30 vial 1     albuterol (PROAIR HFA/PROVENTIL HFA/VENTOLIN HFA) 108 (90 Base) MCG/ACT Inhaler Inhale 2 puffs into the lungs every 6 hours as needed for shortness of breath / dyspnea or wheezing 1 Inhaler 5     aspirin 81 MG tablet Take by mouth daily 30 tablet      blood glucose (NO BRAND SPECIFIED) lancets standard Use to test blood sugar 1 times daily or as directed. 100 each 11     blood glucose monitoring (NO BRAND SPECIFIED) meter device kit Use to test blood sugar 1 times daily or as directed. 1 kit 0     blood glucose monitoring (NO BRAND SPECIFIED) test strip Use to test blood sugars 1 times daily or as directed 100 strip 11     CLONIDINE HCL PO Take 0.1 mg by mouth 3 times daily as needed       DULoxetine HCl (CYMBALTA PO) Take 120 mg by mouth daily        gabapentin (NEURONTIN) 600 MG tablet 1,200 mg 3 times daily   2     levothyroxine (SYNTHROID/LEVOTHROID) 88 MCG tablet TAKE 1 TABLET BY MOUTH EVERY DAY 90 tablet 2     lisinopril (PRINIVIL/ZESTRIL) 2.5 MG tablet TAKE 1 TABLET(2.5 MG) BY MOUTH DAILY 90 tablet 3     metFORMIN (GLUCOPHAGE-XR) 500 MG 24 hr tablet TAKE 1 TABLET BY MOUTH TWICE DAILY WITH MEALS 360 tablet 0     methadone (DOLOPHINE) 10 MG tablet Take 2 pills  tablet 0     MIRALAX OR 1 capful daily prn       MULTIVITAMIN OR 1 a day       nitroFURantoin, macrocrystal-monohydrate, (MACROBID) 100 MG capsule Take 1 capsule (100 mg) by mouth 2 times daily 10 capsule 0     nystatin (MYCOSTATIN) 592571 UNIT/GM POWD Apply topically 3 times daily as needed 30 g 1     ondansetron (ZOFRAN ODT) 4 MG ODT tab Take 1-2 tablets (4-8 mg) by mouth every 8 hours as needed for nausea 20 tablet 1     order for DME Equipment being ordered: Nebulizer 1  each 0     order for DME Equipment being ordered: BIPAP.   15/5, Rate of 12, 2L O2 to keep sats 90-95%. 1 each 0     order for DME Equipment being ordered: Nebulizer 1 Package 0     order for DME Equipment being ordered: Hospital Bed, total electric (head, foot, and height adjustments), with any type side rails, with mattress 1 each 0     order for DME Equipment being ordered: Motorized Wheelchair 1 each 0     order for DME Equipment being ordered: Trapeze bar for hospital bed 1 each 0     oxybutynin (DITROPAN XL) 10 MG 24 hr tablet Take 1 tablet (10 mg) by mouth daily 90 tablet 3     Phenazopyridine HCl (PYRIDIUM PO) Take 2 tablets by mouth 3 times daily as needed Dose unknown       senna-docusate (SENOKOT-S;PERICOLACE) 8.6-50 MG per tablet Take 2 tablets by mouth 2 times daily 100 tablet      senna-docusate (SENOKOT-S;PERICOLACE) 8.6-50 MG per tablet Take 2 tablets by mouth 2 times daily To prevent constipation while taking narcotic pain medication. Start with 1 tablet twice daily. If no bowel movement in 24 hours, increase to 2 tablets twice daily.  Discontinue if you have loose stools or when you are no longer taking narcotics. 30 tablet 0     simvastatin (ZOCOR) 20 MG tablet Take 1 tablet (20 mg) by mouth At Bedtime 90 tablet 3     solifenacin (VESICARE) 5 MG tablet Take 1 tablet (5 mg) by mouth daily 15 tablet 0     sulfamethoxazole-trimethoprim (BACTRIM DS/SEPTRA DS) 800-160 MG per tablet Take 1 tablet by mouth 2 times daily for 5 days 10 tablet 0     tiZANidine (ZANAFLEX) 4 MG tablet Take 1 tablet (4 mg) by mouth 3 times daily 90 tablet      traZODone (DESYREL) 100 MG tablet TAKE 1 TABLET(100 MG) BY MOUTH AT BEDTIME 90 tablet 1     traZODone (DESYREL) 100 MG tablet TAKE 1 TABLET(100 MG) BY MOUTH AT BEDTIME 90 tablet 1     Social History   Substance Use Topics     Smoking status: Never Smoker     Smokeless tobacco: Never Used     Alcohol use No       OBJECTIVE  /70 (BP Location: Right arm, Patient  "Position: Chair, Cuff Size: Adult Regular)  Pulse 76  Temp 98.7  F (37.1  C) (Oral)  Resp 18  Ht 5' 7\" (1.702 m)  SpO2 93%  Breastfeeding? No  BMI 37.28 kg/m2    Physical Exam   Constitutional: She appears well-developed and well-nourished. No distress.   HENT:   Head: Normocephalic and atraumatic.   Mouth/Throat: Oropharynx is clear and moist.   Eyes: Conjunctivae are normal.   Neck: Normal range of motion.   Cardiovascular: Regular rhythm and normal heart sounds.    Pulmonary/Chest: Effort normal and breath sounds normal. No respiratory distress.   Abdominal: Soft. Bowel sounds are normal. She exhibits no distension. There is no tenderness. There is no rebound and no guarding.   Abdomen is protuberant, suprapubic catheter present.   Neurological: She is alert.   Skin: Skin is warm and dry.   Psychiatric: She has a normal mood and affect.       Labs:  No results found for this or any previous visit (from the past 24 hour(s)).     WBC Urine 0 - 5  OTO5^0 - 5 /HPF Final 11/21/2018  6:30    RBC Urine 2-5 (A) OTO2^O - 2 /HPF Final 11/21/2018  6:30    Bacteria Urine Moderate (A) NEG^Negative /HPF Final 11/21/2018  6:30    Comment:   Interfering substances, dipstick not done         ASSESSMENT:      ICD-10-CM    1. Acute urinary tract infection N39.0 sulfamethoxazole-trimethoprim (BACTRIM DS/SEPTRA DS) 800-160 MG per tablet   2. Dysuria R30.0 Urine Microscopic     Urine Culture Aerobic Bacterial     CANCELED: *UA reflex to Microscopic and Culture ([facility] (except [place] and [place])   3. Nonspecific finding on examination of urine R82.90 Urine Culture Aerobic Bacterial     sulfamethoxazole-trimethoprim (BACTRIM DS/SEPTRA DS) 800-160 MG per tablet        Medical Decision Making:    Differential Diagnosis:  UTI: UTI, Dysuria, Urethritis and Vaginitis    Serious Comorbid Conditions:  Adult:  None    PLAN:    UTI Adult:  UA with moderate bacteria , no elevated WBC. Bactrim Rx. " Push fluids. Keep monitoring. We will communicate any changes to the antibiotic if need be based on the urine culture result. Follow up sooner if any worsening symptoms. She agrees. .    Followup:    If not improving or if condition worsens, follow up with your Primary Care Provider

## 2018-11-23 ASSESSMENT — ENCOUNTER SYMPTOMS
VOMITING: 0
HEMATURIA: 0
CHILLS: 0
DIARRHEA: 0
FREQUENCY: 0
NAUSEA: 1
FEVER: 1
DYSURIA: 1

## 2018-11-24 LAB
BACTERIA SPEC CULT: ABNORMAL
BACTERIA SPEC CULT: ABNORMAL
SPECIMEN SOURCE: ABNORMAL

## 2018-12-04 DIAGNOSIS — J45.41 MODERATE PERSISTENT ASTHMA WITH ACUTE EXACERBATION: ICD-10-CM

## 2018-12-04 NOTE — TELEPHONE ENCOUNTER
"Requested Prescriptions   Pending Prescriptions Disp Refills     VENTOLIN  (90 Base) MCG/ACT inhaler [Pharmacy Med Name: VENTOLIN HFA INH W/DOS CTR 200PUFFS] 18 g 0     Sig: INHALE 2 PUFFS INTO THE LUNGS EVERY 6 HOURS AS NEEDED FOR SHORTNESS OF BREATH OR DIFFICULT BREATHING OR WHEEZING    Asthma Maintenance Inhalers - Anticholinergics Passed    12/4/2018  3:47 AM       Passed - Patient is age 12 years or older       Passed - Asthma control assessment score within normal limits in last 6 months    Please review ACT score.          Passed - Recent (6 mo) or future (30 days) visit within the authorizing provider's specialty    Patient had office visit in the last 6 months or has a visit in the next 30 days with authorizing provider or within the authorizing provider's specialty.  See \"Patient Info\" tab in inbasket, or \"Choose Columns\" in Meds & Orders section of the refill encounter.              "

## 2018-12-05 ENCOUNTER — TRANSFERRED RECORDS (OUTPATIENT)
Dept: HEALTH INFORMATION MANAGEMENT | Facility: CLINIC | Age: 63
End: 2018-12-05

## 2018-12-05 RX ORDER — ALBUTEROL SULFATE 90 UG/1
AEROSOL, METERED RESPIRATORY (INHALATION)
Qty: 18 G | Refills: 0 | Status: SHIPPED | OUTPATIENT
Start: 2018-12-05 | End: 2019-03-05

## 2018-12-05 NOTE — TELEPHONE ENCOUNTER
Last office visit: 8/15/2018 with prescribing provider:     Future Office Visit:    Prescription approved per [facility] Refill Protocol.  Ekta Mccarthy RN, BSN

## 2018-12-08 DIAGNOSIS — E78.5 HYPERLIPIDEMIA LDL GOAL <100: ICD-10-CM

## 2018-12-08 NOTE — TELEPHONE ENCOUNTER
"Requested Prescriptions   Pending Prescriptions Disp Refills     simvastatin (ZOCOR) 20 MG tablet [Pharmacy Med Name: SIMVASTATIN 20MG TABLETS] 90 tablet 0    Last Written Prescription Date:  12/4/17  Last Fill Quantity: 90,  # refills: 3   Last Office Visit: 8/15/2018 [facility]       Return in about 6 months (around 2/15/2019).     Future Office Visit:    Next 5 appointments (look out 90 days)     Dec 13, 2018 11:00 AM CST   MyChart Long with Shawnee Dueñas,    [facility]    [address] [place] [id number] [phone]                  Sig: TAKE 1 TABLET(20 MG) BY MOUTH AT BEDTIME    Statins Protocol Failed    12/8/2018  3:47 AM       Failed - LDL on file in past 12 months    Recent Labs   Lab Test  11/01/17   1535   LDL  59            Passed - No abnormal creatine kinase in past 12 months    No lab results found.            Passed - Recent (12 mo) or future (30 days) visit within the authorizing provider's specialty    Patient had office visit in the last 12 months or has a visit in the next 30 days with authorizing provider or within the authorizing provider's specialty.  See \"Patient Info\" tab in inbasket, or \"Choose Columns\" in Meds & Orders section of the refill encounter.             Passed - Patient is age 18 or older       Passed - No active pregnancy on record       Passed - No positive pregnancy test in past 12 months          "

## 2018-12-10 DIAGNOSIS — E78.5 HYPERLIPIDEMIA LDL GOAL <100: ICD-10-CM

## 2018-12-10 RX ORDER — SIMVASTATIN 20 MG
TABLET ORAL
Qty: 90 TABLET | Refills: 0 | Status: CANCELLED | OUTPATIENT
Start: 2018-12-10

## 2018-12-10 RX ORDER — SIMVASTATIN 20 MG
TABLET ORAL
Qty: 30 TABLET | Refills: 0 | Status: SHIPPED | OUTPATIENT
Start: 2018-12-10 | End: 2018-12-13

## 2018-12-10 NOTE — TELEPHONE ENCOUNTER
"Requested Prescriptions   Pending Prescriptions Disp Refills     simvastatin (ZOCOR) 20 MG tablet [Pharmacy Med Name: SIMVASTATIN 20MG TABLETS]  Last Written Prescription Date:  12/10/2018  Last Fill Quantity: 30 tablet,  # refills: 0   Last Office Visit: 8/15/2018   Future Office Visit:    Next 5 appointments (look out 90 days)    Dec 13, 2018 11:00 AM KWADWO Arciniega with Shawnee Dueñas DO  [facility] [address] [place] [id number] [phone]          90 tablet 0     Sig: TAKE 1 TABLET BY MOUTH EVERY NIGHT AT BEDTIME    Statins Protocol Failed - 12/10/2018 12:25 PM       Failed - LDL on file in past 12 months    Recent Labs   Lab Test 11/01/17  1535   LDL 59            Passed - No abnormal creatine kinase in past 12 months    No lab results found.            Passed - Recent (12 mo) or future (30 days) visit within the authorizing provider's specialty    Patient had office visit in the last 12 months or has a visit in the next 30 days with authorizing provider or within the authorizing provider's specialty.  See \"Patient Info\" tab in inbasket, or \"Choose Columns\" in Meds & Orders section of the refill encounter.             Passed - Patient is age 18 or older       Passed - No active pregnancy on record       Passed - No positive pregnancy test in past 12 months          "

## 2018-12-10 NOTE — TELEPHONE ENCOUNTER
Appointment scheduled  Prescription approved per [facility] Refill Protocol.  Ekta Mccarthy RN, BSN

## 2018-12-11 DIAGNOSIS — N32.89 BLADDER SPASMS: Primary | ICD-10-CM

## 2018-12-11 RX ORDER — SOLIFENACIN SUCCINATE 5 MG/1
5 TABLET, FILM COATED ORAL DAILY
Qty: 90 TABLET | Refills: 3 | Status: SHIPPED | OUTPATIENT
Start: 2018-12-11 | End: 2019-03-11

## 2018-12-13 ENCOUNTER — OFFICE VISIT (OUTPATIENT)
Dept: FAMILY MEDICINE | Facility: CLINIC | Age: 63
End: 2018-12-13
Payer: COMMERCIAL

## 2018-12-13 ENCOUNTER — MYC MEDICAL ADVICE (OUTPATIENT)
Dept: UROLOGY | Facility: CLINIC | Age: 63
End: 2018-12-13

## 2018-12-13 VITALS
BODY MASS INDEX: 39.87 KG/M2 | OXYGEN SATURATION: 95 % | TEMPERATURE: 98.7 F | HEIGHT: 67 IN | HEART RATE: 72 BPM | SYSTOLIC BLOOD PRESSURE: 122 MMHG | DIASTOLIC BLOOD PRESSURE: 58 MMHG | WEIGHT: 254 LBS | RESPIRATION RATE: 16 BRPM

## 2018-12-13 DIAGNOSIS — E78.5 HYPERLIPIDEMIA LDL GOAL <100: ICD-10-CM

## 2018-12-13 DIAGNOSIS — N31.9 NEUROGENIC BLADDER: ICD-10-CM

## 2018-12-13 DIAGNOSIS — N32.89 BLADDER SPASMS: Primary | ICD-10-CM

## 2018-12-13 DIAGNOSIS — E11.21 TYPE 2 DIABETES MELLITUS WITH DIABETIC NEPHROPATHY, WITHOUT LONG-TERM CURRENT USE OF INSULIN (H): Primary | ICD-10-CM

## 2018-12-13 PROCEDURE — 99214 OFFICE O/P EST MOD 30 MIN: CPT | Performed by: FAMILY MEDICINE

## 2018-12-13 RX ORDER — SIMVASTATIN 20 MG
TABLET ORAL
Qty: 90 TABLET | Refills: 3 | Status: SHIPPED | OUTPATIENT
Start: 2018-12-13 | End: 2019-12-23

## 2018-12-13 ASSESSMENT — PATIENT HEALTH QUESTIONNAIRE - PHQ9
SUM OF ALL RESPONSES TO PHQ QUESTIONS 1-9: 7
SUM OF ALL RESPONSES TO PHQ QUESTIONS 1-9: 7
10. IF YOU CHECKED OFF ANY PROBLEMS, HOW DIFFICULT HAVE THESE PROBLEMS MADE IT FOR YOU TO DO YOUR WORK, TAKE CARE OF THINGS AT HOME, OR GET ALONG WITH OTHER PEOPLE: VERY DIFFICULT

## 2018-12-13 ASSESSMENT — ANXIETY QUESTIONNAIRES
5. BEING SO RESTLESS THAT IT IS HARD TO SIT STILL: NOT AT ALL
6. BECOMING EASILY ANNOYED OR IRRITABLE: MORE THAN HALF THE DAYS
4. TROUBLE RELAXING: NOT AT ALL
2. NOT BEING ABLE TO STOP OR CONTROL WORRYING: SEVERAL DAYS
3. WORRYING TOO MUCH ABOUT DIFFERENT THINGS: MORE THAN HALF THE DAYS
GAD7 TOTAL SCORE: 6
GAD7 TOTAL SCORE: 6
1. FEELING NERVOUS, ANXIOUS, OR ON EDGE: SEVERAL DAYS
7. FEELING AFRAID AS IF SOMETHING AWFUL MIGHT HAPPEN: NOT AT ALL
7. FEELING AFRAID AS IF SOMETHING AWFUL MIGHT HAPPEN: NOT AT ALL
GAD7 TOTAL SCORE: 6

## 2018-12-13 ASSESSMENT — MIFFLIN-ST. JEOR: SCORE: 1744.77

## 2018-12-13 NOTE — PROGRESS NOTES
SUBJECTIVE:   Bhavani White is a 63 year old female who presents to clinic today for the following health issues:      History of Present Illness     Diabetes:     Frequency of checking blood sugars::  Not at all    Diabetic concerns::  Other    Hypoglycemia symptoms::  Shaky, Weak and Other    Paraesthesia present::  YES    Eye Exam in the last year::  Yes    3/2018    Diabetes Management Resources    Patient has been noticing episodes of shakiness, weakness, and sweating around once a week.  Doesn't check her blood sugar, but does improve when she eats.  Currently taking metformin 500mg BID.  Last A1C in August was 6.0.      Problem list and histories reviewed & adjusted, as indicated.  Additional history: as documented        Patient Active Problem List   Diagnosis     Chronic low back pain     Moderate major depression (H)     Anxiety     Type 2 diabetes mellitus with diabetic nephropathy (H)     Hypothyroidism due to acquired atrophy of thyroid     Suprapubic catheter (H)     Lumbar spinal stenosis     Fibromyalgia     Osteoarthritis     Hypertension goal BP (blood pressure) < 140/90     Health Care Home     Neuropathy     Hyperlipidemia LDL goal <100     ACP (advance care planning)     Recurrent UTI (urinary tract infection)     Controlled substance agreement signed 10/24/2014     Chronic narcotic dependence (H)     History of ulcer disease     Wheelchair bound     Asthma     Personal history of methicillin resistant Staphylococcus aureus     Fracture of lower extremity     Neurogenic bladder     Ventral hernia     Osteoarthritis of cervical spine with myelopathy     DISH (diffuse idiopathic skeletal hyperostosis)     Morbid obesity, unspecified obesity type (H)     Methadone use (H)     Past Surgical History:   Procedure Laterality Date     ABDOMEN SURGERY       C LAMINECTOMY,FACETECTOMY,LUMBAR  1982     C TOTAL KNEE ARTHROPLASTY  1999    left     CYSTOSCOPY N/A 9/21/2018    Procedure: CYSTOSCOPY;   Cystoscopy and Botox Injection Into the Bladder and suprapubic tube exchange;  Surgeon: Yoandy Rios MD;  [facility]     CYSTOSCOPY, INTRAVESICAL INJECTION N/A 8/23/2017    Procedure: CYSTOSCOPY, INTRAVESICAL INJECTION;  Cystoscopy, Botox Injection Into The Bladder  Latex Allergy ;  Surgeon: Yoandy Rios MD;  [facility]     CYSTOSCOPY, INTRAVESICAL INJECTION N/A 3/8/2018    Procedure: CYSTOSCOPY, INTRAVESICAL INJECTION;  Cystoscopy, Botox Injection Into The Bladder and suprapubic catheter exchange;  Surgeon: Yoandy Rios MD;  [facility]     DISCECTOMY, FUSION CERVICAL ANTERIOR THREE+ LEVELS, COMBINED Left 5/3/2016    Procedure: COMBINED DISCECTOMY, FUSION CERVICAL ANTERIOR THREE+ LEVELS;  Surgeon: Jasvir Torres MD;  [facility]     GENITOURINARY SURGERY      suprapubic catheter     HERNIA REPAIR  1957    double hernia     HYSTERECTOMY, PAP NO LONGER INDICATED      CELIA and large ovarian tumor removed     INJECT BOTOX N/A 9/21/2018    Procedure: INJECT BOTOX;;  Surgeon: Yoandy Rios MD;  [facility]     SURGICAL HISTORY OF -       left cataract surgery     SURGICAL HISTORY OF -   12/14    right cataract surgery and left laser revision     SURGICAL HISTORY OF -   March 2015    left carpal tunnel release     TONSILLECTOMY  1974       Social History     Tobacco Use     Smoking status: Never Smoker     Smokeless tobacco: Never Used   Substance Use Topics     Alcohol use: No     Family History   Problem Relation Age of Onset     Depression Son      Hypertension Mother      Heart Disease Mother         bypass     Depression Mother      Hypertension Father      Connective Tissue Disorder Father         ankylosing spondylitis     Cancer Father         colon; prostate     Other Cancer Father         bladder cancer     Depression Sister            ROS:  Constitutional, HEENT, cardiovascular, pulmonary, gi and gu systems are negative, except as otherwise  "noted.    OBJECTIVE:     /58 (BP Location: Right arm, Patient Position: Chair, Cuff Size: Adult Regular)   Pulse 72   Temp 98.7  F (37.1  C) (Oral)   Resp 16   Ht 1.702 m (5' 7\")   Wt 115.2 kg (254 lb)   SpO2 95%   BMI 39.78 kg/m    Body mass index is 39.78 kg/m .  GENERAL: healthy, alert and no distress  RESP: lungs clear to auscultation - no rales, rhonchi or wheezes  CV: regular rate and rhythm, normal S1 S2, no S3 or S4, no murmur, click or rub, no peripheral edema and peripheral pulses strong  MS: Wheelchair bound    ASSESSMENT/PLAN:     1. Hyperlipidemia LDL goal <100  - simvastatin (ZOCOR) 20 MG tablet; TAKE 1 TABLET(20 MG) BY MOUTH AT BEDTIME  Dispense: 90 tablet; Refill: 3  - Lipid panel reflex to direct LDL Fasting; Future    2. Type 2 diabetes mellitus with diabetic nephropathy, without long-term current use of insulin (H)  - Recent hypoglycemia symptoms  - Check A1C today  - Metformin dose will likely need to be reduced.  Will send in refills once labs are back   - Lipid panel reflex to direct LDL Fasting; Future  - Comprehensive metabolic panel; Future  - Albumin Random Urine Quantitative with Creat Ratio; Future  - **A1C FUTURE anytime; Future    Follow up in 6 months     Shawnee Dueñas, DO  [facility]  Answers for HPI/ROS submitted by the patient on 12/13/2018   Chronic problems general questions HPI Form  If you checked off any problems, how difficult have these problems made it for you to do your work, take care of things at home, or get along with other people?: Very difficult  PHQ9 TOTAL SCORE: 7  JEOVANNY 7 TOTAL SCORE: 6    "

## 2018-12-14 RX ORDER — OXYBUTYNIN CHLORIDE 15 MG/1
15 TABLET, EXTENDED RELEASE ORAL DAILY
Qty: 90 TABLET | Refills: 3 | Status: SHIPPED | OUTPATIENT
Start: 2018-12-14 | End: 2019-12-29

## 2018-12-14 ASSESSMENT — ANXIETY QUESTIONNAIRES: GAD7 TOTAL SCORE: 6

## 2018-12-14 ASSESSMENT — PATIENT HEALTH QUESTIONNAIRE - PHQ9: SUM OF ALL RESPONSES TO PHQ QUESTIONS 1-9: 7

## 2018-12-18 DIAGNOSIS — E03.4 HYPOTHYROIDISM DUE TO ACQUIRED ATROPHY OF THYROID: ICD-10-CM

## 2018-12-18 NOTE — TELEPHONE ENCOUNTER
"Last Written Prescription Date:  4/02/18  Last Fill Quantity: 90 tablet,  # refills: 2   Last office visit: 12/13/2018 with prescribing provider:  [facility]   Future Office Visit:      Requested Prescriptions   Pending Prescriptions Disp Refills     levothyroxine (SYNTHROID/LEVOTHROID) 88 MCG tablet [Pharmacy Med Name: LEVOTHYROXINE 0.088MG (88MCG) TAB] 90 tablet 0     Sig: TAKE 1 TABLET BY MOUTH EVERY DAY    Thyroid Protocol Failed - 12/18/2018  3:48 AM       Failed - Normal TSH on file in past 12 months    Recent Labs   Lab Test 08/15/18  1224   TSH 0.28*             Passed - Patient is 12 years or older       Passed - Recent (12 mo) or future (30 days) visit within the authorizing provider's specialty    Patient had office visit in the last 12 months or has a visit in the next 30 days with authorizing provider or within the authorizing provider's specialty.  See \"Patient Info\" tab in inbasket, or \"Choose Columns\" in Meds & Orders section of the refill encounter.             Passed - No active pregnancy on record    If patient is pregnant or has had a positive pregnancy test, please check TSH.         Passed - No positive pregnancy test in past 12 months    If patient is pregnant or has had a positive pregnancy test, please check TSH.            "

## 2018-12-19 RX ORDER — LEVOTHYROXINE SODIUM 88 UG/1
TABLET ORAL
Qty: 90 TABLET | Refills: 1 | Status: SHIPPED | OUTPATIENT
Start: 2018-12-19 | End: 2019-06-15

## 2018-12-21 ENCOUNTER — TELEPHONE (OUTPATIENT)
Dept: UROLOGY | Facility: CLINIC | Age: 63
End: 2018-12-21

## 2018-12-21 DIAGNOSIS — N31.9 NEUROGENIC BLADDER: Primary | ICD-10-CM

## 2018-12-21 RX ORDER — CEFAZOLIN SODIUM 2 G/50ML
2 SOLUTION INTRAVENOUS
Status: CANCELLED | OUTPATIENT
Start: 2018-12-21

## 2018-12-21 RX ORDER — CEFAZOLIN SODIUM 1 G/50ML
1 INJECTION, SOLUTION INTRAVENOUS SEE ADMIN INSTRUCTIONS
Status: CANCELLED | OUTPATIENT
Start: 2018-12-21

## 2018-12-21 NOTE — TELEPHONE ENCOUNTER
Patient is scheduled for surgery with Dr. Rios      Spoke or left message with: Bhavani    Date of Surgery: 3/15/19    [facility]    Informed patient they will need an adult  yes    Pre-op with surgeon (if applicable): n/a    H&P: Scheduled with pcp    Additional imaging/appointments: n/a    Surgery packet: mailed 12/21/18     Additional comments: n/a

## 2019-01-15 ENCOUNTER — TELEPHONE (OUTPATIENT)
Dept: UROLOGY | Facility: CLINIC | Age: 64
End: 2019-01-15

## 2019-01-15 NOTE — TELEPHONE ENCOUNTER
[facility] Call Center    Phone Message    May a detailed message be left on voicemail: yes    Reason for Call: Other: [facility] called to recertify this pt's home care orders. Orders needed for cath change 1x/month for two months, and two PRN.      Action Taken: Message routed to:  [facility] Urology

## 2019-01-15 NOTE — TELEPHONE ENCOUNTER
Message left on [facility] Carolefransico's voicemail stating that it is ok to recertify this patient catheter changes for one a month for 2 months and two as needed.      Papa Hollis MA

## 2019-01-16 ENCOUNTER — MYC MEDICAL ADVICE (OUTPATIENT)
Dept: FAMILY MEDICINE | Facility: CLINIC | Age: 64
End: 2019-01-16

## 2019-01-16 DIAGNOSIS — E11.21 TYPE 2 DIABETES MELLITUS WITH DIABETIC NEPHROPATHY (H): ICD-10-CM

## 2019-01-17 ENCOUNTER — MYC MEDICAL ADVICE (OUTPATIENT)
Dept: FAMILY MEDICINE | Facility: CLINIC | Age: 64
End: 2019-01-17

## 2019-01-17 DIAGNOSIS — E11.21 TYPE 2 DIABETES MELLITUS WITH DIABETIC NEPHROPATHY, WITHOUT LONG-TERM CURRENT USE OF INSULIN (H): Primary | ICD-10-CM

## 2019-01-17 RX ORDER — BLOOD-GLUCOSE METER
KIT MISCELLANEOUS
Qty: 1 KIT | Refills: 0 | Status: SHIPPED | OUTPATIENT
Start: 2019-01-17 | End: 2020-05-18

## 2019-01-17 NOTE — TELEPHONE ENCOUNTER
Dr. Dueñas-    Patient last seen on 12/13 for office visit. Order pended for freestyle kit. Please sign. Pharmacy selected in order.    Thank you,  Tiffanie Muro RN

## 2019-01-29 LAB
ALBUMIN SERPL-MCNC: 3.5 G/DL (ref 3.4–5)
ALP SERPL-CCNC: 82 U/L (ref 40–150)
ALT SERPL W P-5'-P-CCNC: 30 U/L (ref 0–50)
ANION GAP SERPL CALCULATED.3IONS-SCNC: 5 MMOL/L (ref 3–14)
AST SERPL W P-5'-P-CCNC: 23 U/L (ref 0–45)
BILIRUB SERPL-MCNC: 0.4 MG/DL (ref 0.2–1.3)
BUN SERPL-MCNC: 23 MG/DL (ref 7–30)
CALCIUM SERPL-MCNC: 9.8 MG/DL (ref 8.5–10.1)
CHLORIDE SERPL-SCNC: 98 MMOL/L (ref 94–109)
CHOLEST SERPL-MCNC: 158 MG/DL
CO2 SERPL-SCNC: 33 MMOL/L (ref 20–32)
CREAT SERPL-MCNC: 0.79 MG/DL (ref 0.52–1.04)
GFR SERPL CREATININE-BSD FRML MDRD: 80 ML/MIN/{1.73_M2}
GLUCOSE SERPL-MCNC: 75 MG/DL (ref 70–99)
HBA1C MFR BLD: 6.4 % (ref 0–5.6)
HDLC SERPL-MCNC: 52 MG/DL
LDLC SERPL CALC-MCNC: 70 MG/DL
NONHDLC SERPL-MCNC: 106 MG/DL
POTASSIUM SERPL-SCNC: 4.4 MMOL/L (ref 3.4–5.3)
PROT SERPL-MCNC: 7.8 G/DL (ref 6.8–8.8)
SODIUM SERPL-SCNC: 136 MMOL/L (ref 133–144)
TRIGL SERPL-MCNC: 180 MG/DL

## 2019-01-29 PROCEDURE — 80061 LIPID PANEL: CPT | Performed by: FAMILY MEDICINE

## 2019-01-29 PROCEDURE — 83036 HEMOGLOBIN GLYCOSYLATED A1C: CPT | Performed by: FAMILY MEDICINE

## 2019-01-29 PROCEDURE — 80053 COMPREHEN METABOLIC PANEL: CPT | Performed by: FAMILY MEDICINE

## 2019-02-05 ENCOUNTER — MYC MEDICAL ADVICE (OUTPATIENT)
Dept: FAMILY MEDICINE | Facility: CLINIC | Age: 64
End: 2019-02-05

## 2019-03-05 DIAGNOSIS — J45.41 MODERATE PERSISTENT ASTHMA WITH ACUTE EXACERBATION: ICD-10-CM

## 2019-03-05 NOTE — TELEPHONE ENCOUNTER
"Last Written Prescription Date:  12/05/18  Last Fill Quantity: 18 g,  # refills: 0   Last office visit: 12/13/2018 with prescribing provider:  Sp[facility]er   Future Office Visit:      ACT Total Scores 1/2/2018 2/15/2018 8/15/2018   ACT TOTAL SCORE - - -   ASTHMA ER VISITS - - -   ASTHMA HOSPITALIZATIONS - - -   ACT TOTAL SCORE (Goal Greater than or Equal to 20) 25 15 20   In the past 12 months, how many times did you visit the emergency room for your asthma without being admitted to the hospital? - 0 0   In the past 12 months, how many times were you hospitalized overnight because of your asthma? - 0 0     Requested Prescriptions   Pending Prescriptions Disp Refills     albuterol (PROAIR HFA/PROVENTIL HFA/VENTOLIN HFA) 108 (90 Base) MCG/ACT inhaler [Pharmacy Med Name: ALBUTEROL HFA INH (200 PUFFS) 18GM] 18 g 0     Sig: INHALE 2 PUFFS INTO THE LUNGS EVERY 6 HOURS AS NEEDED FOR SHORTNESS OF BREATH OR DIFFICULT BREATHING OR WHEEZING    Asthma Maintenance Inhalers - Anticholinergics Failed - 3/5/2019  6:49 AM       Failed - Asthma control assessment score within normal limits in last 6 months    Please review ACT score.          Passed - Patient is age 12 years or older       Passed - Medication is active on med list       Passed - Recent (6 mo) or future (30 days) visit within the authorizing provider's specialty    Patient had office visit in the last 6 months or has a visit in the next 30 days with authorizing provider or within the authorizing provider's specialty.  See \"Patient Info\" tab in inbasket, or \"Choose Columns\" in Meds & Orders section of the refill encounter.              "

## 2019-03-06 DIAGNOSIS — J45.41 MODERATE PERSISTENT ASTHMA WITH ACUTE EXACERBATION: ICD-10-CM

## 2019-03-06 DIAGNOSIS — Z01.818 PREOP GENERAL PHYSICAL EXAM: Primary | ICD-10-CM

## 2019-03-06 RX ORDER — ALBUTEROL SULFATE 90 UG/1
AEROSOL, METERED RESPIRATORY (INHALATION)
Qty: 18 G | Refills: 1 | Status: SHIPPED | OUTPATIENT
Start: 2019-03-06 | End: 2019-04-19

## 2019-03-06 NOTE — TELEPHONE ENCOUNTER
Prescription approved per [facility] Refill Protocol.  Ekta Mccarthy RN, BSN

## 2019-03-07 NOTE — TELEPHONE ENCOUNTER
"Requested Prescriptions   Pending Prescriptions Disp Refills     albuterol (PROAIR HFA/PROVENTIL HFA/VENTOLIN HFA) 108 (90 Base) MCG/ACT inhaler [Pharmacy Med Name: ALBUTEROL HFA INH (200 PUFFS) 18GM]  Medication may not be due for refill  Last Written Prescription Date:  3/6/2019  Last Fill Quantity: 18 g,  # refills: 1   Last office visit: 12/13/2018 with prescribing provider:  Spenser Shahid Office Visit:   Next 5 appointments (look out 90 days)    Mar 11, 2019  1:00 PM CDT  Pre-Op physical with Mike Weiner MD  [facility] [address] [place] [id number] [phone]          54 g 1     Sig: INHALE 2 PUFFS INTO THE LUNGS EVERY 6 HOURS AS NEEDED FOR SHORTNESS OF BREATH    Asthma Maintenance Inhalers - Anticholinergics Failed - 3/6/2019  3:35 PM       Failed - Asthma control assessment score within normal limits in last 6 months    ACT Total Scores 1/2/2018 2/15/2018 8/15/2018   ACT TOTAL SCORE - - -   ASTHMA ER VISITS - - -   ASTHMA HOSPITALIZATIONS - - -   ACT TOTAL SCORE (Goal Greater than or Equal to 20) 25 15 20   In the past 12 months, how many times did you visit the emergency room for your asthma without being admitted to the hospital? - 0 0   In the past 12 months, how many times were you hospitalized overnight because of your asthma? - 0 0              Passed - Patient is age 12 years or older       Passed - Medication is active on med list       Passed - Recent (6 mo) or future (30 days) visit within the authorizing provider's specialty    Patient had office visit in the last 6 months or has a visit in the next 30 days with authorizing provider or within the authorizing provider's specialty.  See \"Patient Info\" tab in inbasket, or \"Choose Columns\" in Meds & Orders section of the refill encounter.              "

## 2019-03-08 RX ORDER — ALBUTEROL SULFATE 90 UG/1
AEROSOL, METERED RESPIRATORY (INHALATION)
Qty: 54 G | Refills: 1
Start: 2019-03-08

## 2019-03-11 ENCOUNTER — MYC MEDICAL ADVICE (OUTPATIENT)
Dept: FAMILY MEDICINE | Facility: CLINIC | Age: 64
End: 2019-03-11

## 2019-03-11 ENCOUNTER — OFFICE VISIT (OUTPATIENT)
Dept: FAMILY MEDICINE | Facility: CLINIC | Age: 64
End: 2019-03-11
Payer: MEDICARE

## 2019-03-11 VITALS
WEIGHT: 259.5 LBS | TEMPERATURE: 98.4 F | DIASTOLIC BLOOD PRESSURE: 91 MMHG | RESPIRATION RATE: 14 BRPM | BODY MASS INDEX: 40.64 KG/M2 | HEART RATE: 101 BPM | SYSTOLIC BLOOD PRESSURE: 165 MMHG | OXYGEN SATURATION: 95 %

## 2019-03-11 DIAGNOSIS — J45.41 MODERATE PERSISTENT ASTHMA WITH ACUTE EXACERBATION: ICD-10-CM

## 2019-03-11 DIAGNOSIS — Z01.818 PREOP GENERAL PHYSICAL EXAM: Primary | ICD-10-CM

## 2019-03-11 LAB
ALBUMIN UR-MCNC: 30 MG/DL
AMORPH CRY #/AREA URNS HPF: ABNORMAL /HPF
APPEARANCE UR: ABNORMAL
BACTERIA #/AREA URNS HPF: ABNORMAL /HPF
BASOPHILS # BLD AUTO: 0.1 10E9/L (ref 0–0.2)
BASOPHILS NFR BLD AUTO: 0.5 %
BILIRUB UR QL STRIP: NEGATIVE
COLOR UR AUTO: YELLOW
DIFFERENTIAL METHOD BLD: ABNORMAL
EOSINOPHIL # BLD AUTO: 0.3 10E9/L (ref 0–0.7)
EOSINOPHIL NFR BLD AUTO: 3 %
ERYTHROCYTE [DISTWIDTH] IN BLOOD BY AUTOMATED COUNT: 13.3 % (ref 10–15)
GLUCOSE UR STRIP-MCNC: NEGATIVE MG/DL
HCT VFR BLD AUTO: 52.7 % (ref 35–47)
HGB BLD-MCNC: 17.5 G/DL (ref 11.7–15.7)
HGB UR QL STRIP: ABNORMAL
KETONES UR STRIP-MCNC: NEGATIVE MG/DL
LEUKOCYTE ESTERASE UR QL STRIP: ABNORMAL
LYMPHOCYTES # BLD AUTO: 1.9 10E9/L (ref 0.8–5.3)
LYMPHOCYTES NFR BLD AUTO: 18.4 %
MCH RBC QN AUTO: 28.5 PG (ref 26.5–33)
MCHC RBC AUTO-ENTMCNC: 33.2 G/DL (ref 31.5–36.5)
MCV RBC AUTO: 86 FL (ref 78–100)
MONOCYTES # BLD AUTO: 0.8 10E9/L (ref 0–1.3)
MONOCYTES NFR BLD AUTO: 7.6 %
NEUTROPHILS # BLD AUTO: 7.2 10E9/L (ref 1.6–8.3)
NEUTROPHILS NFR BLD AUTO: 70.5 %
NITRATE UR QL: POSITIVE
PH UR STRIP: 7.5 PH (ref 5–7)
PLATELET # BLD AUTO: 225 10E9/L (ref 150–450)
RBC # BLD AUTO: 6.14 10E12/L (ref 3.8–5.2)
RBC #/AREA URNS AUTO: ABNORMAL /HPF
SOURCE: ABNORMAL
SP GR UR STRIP: 1.01 (ref 1–1.03)
UROBILINOGEN UR STRIP-ACNC: 0.2 EU/DL (ref 0.2–1)
WBC # BLD AUTO: 10.2 10E9/L (ref 4–11)
WBC #/AREA URNS AUTO: ABNORMAL /HPF

## 2019-03-11 PROCEDURE — 99215 OFFICE O/P EST HI 40 MIN: CPT | Performed by: FAMILY MEDICINE

## 2019-03-11 PROCEDURE — 81001 URINALYSIS AUTO W/SCOPE: CPT | Performed by: UROLOGY

## 2019-03-11 PROCEDURE — 36415 COLL VENOUS BLD VENIPUNCTURE: CPT | Performed by: FAMILY MEDICINE

## 2019-03-11 PROCEDURE — 80048 BASIC METABOLIC PNL TOTAL CA: CPT | Performed by: FAMILY MEDICINE

## 2019-03-11 PROCEDURE — 93000 ELECTROCARDIOGRAM COMPLETE: CPT | Performed by: FAMILY MEDICINE

## 2019-03-11 PROCEDURE — 85025 COMPLETE CBC W/AUTO DIFF WBC: CPT | Performed by: FAMILY MEDICINE

## 2019-03-11 PROCEDURE — 87086 URINE CULTURE/COLONY COUNT: CPT | Performed by: UROLOGY

## 2019-03-11 RX ORDER — ALBUTEROL SULFATE 0.83 MG/ML
2.5 SOLUTION RESPIRATORY (INHALATION) EVERY 4 HOURS PRN
Qty: 30 VIAL | Refills: 11 | Status: SHIPPED | OUTPATIENT
Start: 2019-03-11 | End: 2020-03-13

## 2019-03-11 RX ORDER — LEVALBUTEROL INHALATION SOLUTION 1.25 MG/3ML
1 SOLUTION RESPIRATORY (INHALATION) EVERY 8 HOURS PRN
Qty: 60 ML | Refills: 1 | Status: SHIPPED | OUTPATIENT
Start: 2019-03-11 | End: 2019-03-13

## 2019-03-11 ASSESSMENT — MIFFLIN-ST. JEOR: SCORE: 1767.45

## 2019-03-11 NOTE — LETTER
My Asthma Action Plan  Name: Bhavani White   YOB: 1955  Date: 3/11/2019   My doctor: Mike Weiner MD   My clinic: [facility]        My Control Medicine: Albuterol  My Rescue Medicine: nebulizer   My Asthma Severity: intermittent  Avoid your asthma triggers: cold air               GREEN ZONE   Good Control    I feel good    No cough or wheeze    Can work, sleep and play without asthma symptoms       Take your asthma control medicine every day.     1. If exercise triggers your asthma, take your rescue medication    15 minutes before exercise or sports, and    During exercise if you have asthma symptoms  2. Spacer to use with inhaler: If you have a spacer, make sure to use it with your inhaler             YELLOW ZONE Getting Worse  I have ANY of these:    I do not feel good    Cough or wheeze    Chest feels tight    Wake up at night   1. Keep taking your Green Zone medications  2. Start taking your rescue medicine:    every 20 minutes for up to 1 hour. Then every 4 hours for 24-48 hours.  3. If you stay in the Yellow Zone for more than 12-24 hours, contact your doctor.  4. If you do not return to the Green Zone in 12-24 hours or you get worse, start taking your oral steroid medicine if prescribed by your provider.           RED ZONE Medical Alert - Get Help  I have ANY of these:    I feel awful    Medicine is not helping    Breathing getting harder    Trouble walking or talking    Nose opens wide to breathe       1. Take your rescue medicine NOW  2. If your provider has prescribed an oral steroid medicine, start taking it NOW  3. Call your doctor NOW  4. If you are still in the Red Zone after 20 minutes and you have not reached your doctor:    Take your rescue medicine again and    Call 911 or go to the emergency room right away    See your regular doctor within 2 weeks of an Emergency Room or Urgent Care visit for follow-up treatment.          Annual Reminders:  Meet with  Asthma Educator,  Flu Shot in the Fall, consider Pneumonia Vaccination for patients with asthma (aged 19 and older).    Pharmacy:    [id number] - [place] [id number] AT [facility] [address] [place] [address]  [facility] - [address]                      Asthma Triggers  How To Control Things That Make Your Asthma Worse    Triggers are things that make your asthma worse.  Look at the list below to help you find your triggers and what you can do about them.  You can help prevent asthma flare-ups by staying away from your triggers.      Trigger                                                          What you can do   Cigarette Smoke  Tobacco smoke can make asthma worse. Do not allow smoking in your home, car or around you.  Be sure no one smokes at a child s day care or school.  If you smoke, ask your health care provider for ways to help you quit.  Ask family members to quit too.  Ask your health care provider for a referral to Quit Plan to help you quit smoking, or call 0-223-305-PLAN.     Colds, Flu, Bronchitis  These are common triggers of asthma. Wash your hands often.  Don t touch your eyes, nose or mouth.  Get a flu shot every year.     Dust Mites  These are tiny bugs that live in cloth or carpet. They are too small to see. Wash sheets and blankets in hot water every week.   Encase pillows and mattress in dust mite proof covers.  Avoid having carpet if you can. If you have carpet, vacuum weekly.   Use a dust mask and HEPA vacuum.   Pollen and Outdoor Mold  Some people are allergic to trees, grass, or weed pollen, or molds. Try to keep your windows closed.  Limit time out doors when pollen count is high.   Ask you health care provider about taking medicine during allergy season.     Animal Dander  Some people are allergic to skin flakes, urine or saliva from pets with fur or feathers. Keep pets with fur or feathers out of your home.    If you can t keep  the pet outdoors, then keep the pet out of your bedroom.  Keep the bedroom door closed.  Keep pets off cloth furniture and away from stuffed toys.     Mice, Rats, and Cockroaches  Some people are allergic to the waste from these pests.   Cover food and garbage.  Clean up spills and food crumbs.  Store grease in the refrigerator.   Keep food out of the bedroom.   Indoor Mold  This can be a trigger if your home has high moisture. Fix leaking faucets, pipes, or other sources of water.   Clean moldy surfaces.  Dehumidify basement if it is damp and smelly.   Smoke, Strong Odors, and Sprays  These can reduce air quality. Stay away from strong odors and sprays, such as perfume, powder, hair spray, paints, smoke incense, paint, cleaning products, candles and new carpet.   Exercise or Sports  Some people with asthma have this trigger. Be active!  Ask your doctor about taking medicine before sports or exercise to prevent symptoms.    Warm up for 5-10 minutes before and after sports or exercise.     Other Triggers of Asthma  Cold air:  Cover your nose and mouth with a scarf.  Sometimes laughing or crying can be a trigger.  Some medicines and food can trigger asthma.

## 2019-03-11 NOTE — PROGRESS NOTES
[facility]  [id number] [place] [id number]  [phone]  Dept: [phone]    PRE-OP EVALUATION:  Today's date: 3/11/2019    Bhavani White (: 1955) presents for pre-operative evaluation assessment as requested by Dr. Lisa.  She requires evaluation and anesthesia risk assessment prior to undergoing surgery/procedure for treatment of the bladder .    Fax number for surgical facility: [facility]  Primary Physician: Shawnee Dueñas  Type of Anesthesia Anticipated: Choice    Patient has a Health Care Directive or Living Will:  YES Health Care Directive    Preop Questions 3/11/2019   Who is doing your surgery? Dr. Yoandy Rios   What are you having done? Botox injections in my bladder   Date of Surgery/Procedure: 3/15/19   Facility or Hospital where procedure/surgery will be performed: [facility]   1.  Do you have a history of Heart attack, stroke, stent, coronary bypass surgery, or other heart surgery? No   2.  Do you ever have any pain or discomfort in your chest? No   3.  Do you have a history of  Heart Failure? No   4.   Are you troubled by shortness of breath when:  walking on a level surface, or up a slight hill, or at night? No   5.  Do you currently have a cold, bronchitis or other respiratory infection? No   6.  Do you have a cough, shortness of breath, or wheezing? No   7.  Do you sometimes get pains in the calves of your legs when you walk? No   8. Do you or anyone in your family have previous history of blood clots? No   9.  Do you or does anyone in your family have a serious bleeding problem such as prolonged bleeding following surgeries or cuts? No   10. Have you ever had problems with anemia or been told to take iron pills? No   11. Have you had any abnormal blood loss such as black, tarry or bloody stools, or abnormal vaginal bleeding? No   12. Have you ever had a blood transfusion? YES -    13. Have you or any of your relatives ever had problems with  anesthesia? No   14. Do you have sleep apnea, excessive snoring or daytime drowsiness? YES -    15. Do you have any prosthetic heart valves? No   16. Do you have prosthetic joints? YES -    17. Is there any chance that you may be pregnant? No         HPI:     HPI related to upcoming procedure: IDDM, paraparesis, bladder dysfunction and indwelling suprapubic catheter          MEDICAL HISTORY:     Patient Active Problem List    Diagnosis Date Noted     Methadone use (H) 2017     Priority: Medium     Morbid obesity, unspecified obesity type (H) 2016     Priority: Medium     Osteoarthritis of cervical spine with myelopathy 2016     Priority: Medium     DISH (diffuse idiopathic skeletal hyperostosis) 2016     Priority: Medium     Wheelchair bound 08/15/2015     Priority: Medium     Personal history of methicillin resistant Staphylococcus aureus 2015     Priority: Medium     Neurogenic bladder 2015     Priority: Medium     Ventral hernia 2015     Priority: Medium     History of ulcer disease 2014     Priority: Medium     She notes from NSAIDs; nearly . Discussed a hospitalization at [place] for this.       Chronic narcotic dependence (H) 2014     Priority: Medium     Recurrent UTI (urinary tract infection) 10/24/2014     Priority: Medium     Controlled substance agreement signed 10/24/2014 10/24/2014     Priority: Medium     She had a controlled substance agreement with [facility] until seen by pain clinic. Now followed by pain clinic; see chronic pain       Fracture of lower extremity 2013     Priority: Medium     ACP (advance care planning) 2013     Priority: Medium     Advance Care Planning:   Receipt of ACP document:  Received: Resuscitation Guidelines order which was witnessed and signed by provider on 09 and 3-18-10.  Document previously scanned on 09 and 3-19-10. Order reviewed and found to be valid.  Code Status  needs to be updated to  reflect choices in most recent ACP document. Confirmed/documented designated decision maker(s). See permanent comments section of demographics in clinical tab. View document(s) and details by clicking on code status. Added by Jie Stewart RN System ACP Cordinator on 2013.             Hyperlipidemia LDL goal <100 2011     Priority: Medium     Neuropathy 2011     Priority: Medium     Asthma 02/15/2011     Priority: Medium     Suprapubic catheter (H) 2011     Priority: Medium     Lumbar spinal stenosis 2011     Priority: Medium     Fibromyalgia 2011     Priority: Medium     Osteoarthritis 2011     Priority: Medium     Hypertension goal BP (blood pressure) < 140/90 2011     Priority: Medium     Health Care Home 2011     Priority: Medium       Status:  No active care coordination   Care Coordinator:  Wendy Pearson [phone]  See Letters for [facility] Care Plan  Date:  May 23, 2016           Hypothyroidism due to acquired atrophy of thyroid 2010     Priority: Medium     Type 2 diabetes mellitus with diabetic nephropathy (H) 10/31/2010     Priority: Medium     Chronic low back pain 2009     Priority: Medium     1981       Moderate major depression (H) 2009     Priority: Medium     Anxiety 2009     Priority: Medium      Past Medical History:   Diagnosis Date     Anxiety      Asthma      Depression      DM (diabetes mellitus) (H)      Frequent UTI      History of blood transfusion      History of ulcer disease 2014    She notes from NSAIDs; nearly . Discussed a hospitalization at [place] for this.     Hypertension      Hypothyroid      Iatrogenic Cushing's disease (H)     off prednisone now     Morbid obesity (H)      MRSA (methicillin resistant staph aureus) culture positive 2012    repeat cx 10/12/2012 no staph mentioned.     Osteoarthritis     multiple joings.     Other chronic pain      Sleep apnea     No longer uses cpap.      Past Surgical History:   Procedure Laterality Date     ABDOMEN SURGERY       C LAMINECTOMY,FACETECTOMY,LUMBAR  1982     C TOTAL KNEE ARTHROPLASTY  1999    left     CYSTOSCOPY N/A 9/21/2018    Procedure: CYSTOSCOPY;  Cystoscopy and Botox Injection Into the Bladder and suprapubic tube exchange;  Surgeon: Yoandy Rios MD;  [facility]     CYSTOSCOPY, INTRAVESICAL INJECTION N/A 8/23/2017    Procedure: CYSTOSCOPY, INTRAVESICAL INJECTION;  Cystoscopy, Botox Injection Into The Bladder  Latex Allergy ;  Surgeon: Yoandy Rios MD;  [facility]     CYSTOSCOPY, INTRAVESICAL INJECTION N/A 3/8/2018    Procedure: CYSTOSCOPY, INTRAVESICAL INJECTION;  Cystoscopy, Botox Injection Into The Bladder and suprapubic catheter exchange;  Surgeon: Yoandy Rios MD;  [facility]     DISCECTOMY, FUSION CERVICAL ANTERIOR THREE+ LEVELS, COMBINED Left 5/3/2016    Procedure: COMBINED DISCECTOMY, FUSION CERVICAL ANTERIOR THREE+ LEVELS;  Surgeon: Jasvir Torres MD;  Location: [facility] OR     GENITOURINARY SURGERY      suprapubic catheter     HERNIA REPAIR  1957    double hernia     HYSTERECTOMY, PAP NO LONGER INDICATED      CELIA and large ovarian tumor removed     INJECT BOTOX N/A 9/21/2018    Procedure: INJECT BOTOX;;  Surgeon: Yoandy Rios MD;  Location: [facility] OR     SURGICAL HISTORY OF -       left cataract surgery     SURGICAL HISTORY OF -   12/14    right cataract surgery and left laser revision     SURGICAL HISTORY OF -   March 2015    left carpal tunnel release     TONSILLECTOMY  1974     Current Outpatient Medications   Medication Sig Dispense Refill     acetaminophen (TYLENOL) 325 MG tablet Take 1-2 tablets (325-650 mg) by mouth every 6 hours as needed for other (mild pain) 25 tablet 0     albuterol (2.5 MG/3ML) 0.083% neb solution Take 1 vial (2.5 mg) by nebulization every 4 hours as needed for shortness of breath / dyspnea or wheezing 30 vial 1     albuterol (PROAIR HFA/PROVENTIL  HFA/VENTOLIN HFA) 108 (90 Base) MCG/ACT inhaler INHALE 2 PUFFS INTO THE LUNGS EVERY 6 HOURS AS NEEDED FOR SHORTNESS OF BREATH OR DIFFICULT BREATHING OR WHEEZING 18 g 1     aspirin 81 MG tablet Take by mouth daily 30 tablet      blood glucose (NO BRAND SPECIFIED) lancets standard Use to test blood sugar 1 times daily or as directed. 100 each 11     blood glucose monitoring (FREESTYLE FREEDOM LITE) meter device kit Use to test blood sugar. 1 kit 0     blood glucose monitoring (NO BRAND SPECIFIED) meter device kit Dispense freestyle zak glucose meter 1 kit 0     blood glucose monitoring (NO BRAND SPECIFIED) test strip Use to test blood sugars 1 times daily or as directed 100 strip 11     CLONIDINE HCL PO Take 0.1 mg by mouth 3 times daily as needed       DULoxetine HCl (CYMBALTA PO) Take 120 mg by mouth daily        gabapentin (NEURONTIN) 600 MG tablet 1,200 mg 3 times daily   2     levothyroxine (SYNTHROID/LEVOTHROID) 88 MCG tablet TAKE 1 TABLET BY MOUTH EVERY DAY 90 tablet 1     lisinopril (PRINIVIL/ZESTRIL) 2.5 MG tablet TAKE 1 TABLET(2.5 MG) BY MOUTH DAILY 90 tablet 3     metFORMIN (GLUCOPHAGE-XR) 500 MG 24 hr tablet TAKE 1 TABLET BY MOUTH TWICE DAILY WITH MEALS 360 tablet 0     methadone (DOLOPHINE) 10 MG tablet Take 2 pills  tablet 0     MIRALAX OR 1 capful daily prn       MULTIVITAMIN OR 1 a day       nystatin (MYCOSTATIN) 129668 UNIT/GM POWD Apply topically 3 times daily as needed 30 g 1     ondansetron (ZOFRAN ODT) 4 MG ODT tab Take 1-2 tablets (4-8 mg) by mouth every 8 hours as needed for nausea 20 tablet 1     order for DME Equipment being ordered: Nebulizer 1 each 0     order for DME Equipment being ordered: BIPAP.   15/5, Rate of 12, 2L O2 to keep sats 90-95%. 1 each 0     order for DME Equipment being ordered: Nebulizer 1 Package 0     order for DME Equipment being ordered: Hospital Bed, total electric (head, foot, and height adjustments), with any type side rails, with mattress 1 each 0      "order for DME Equipment being ordered: Motorized Wheelchair 1 each 0     order for DME Equipment being ordered: Trapeze bar for hospital bed 1 each 0     oxybutynin (DITROPAN XL) 10 MG 24 hr tablet Take 1 tablet (10 mg) by mouth daily 90 tablet 3     oxybutynin ER (DITROPAN XL) 15 MG 24 hr tablet Take 1 tablet (15 mg) by mouth daily 90 tablet 3     Phenazopyridine HCl (PYRIDIUM PO) Take 2 tablets by mouth 3 times daily as needed Dose unknown       senna-docusate (SENOKOT-S;PERICOLACE) 8.6-50 MG per tablet Take 2 tablets by mouth 2 times daily 100 tablet      senna-docusate (SENOKOT-S;PERICOLACE) 8.6-50 MG per tablet Take 2 tablets by mouth 2 times daily To prevent constipation while taking narcotic pain medication. Start with 1 tablet twice daily. If no bowel movement in 24 hours, increase to 2 tablets twice daily.  Discontinue if you have loose stools or when you are no longer taking narcotics. 30 tablet 0     simvastatin (ZOCOR) 20 MG tablet TAKE 1 TABLET(20 MG) BY MOUTH AT BEDTIME 90 tablet 3     solifenacin (VESICARE) 5 MG tablet Take 1 tablet (5 mg) by mouth daily 90 tablet 3     solifenacin (VESICARE) 5 MG tablet Take 1 tablet (5 mg) by mouth daily 15 tablet 0     tiZANidine (ZANAFLEX) 4 MG tablet Take 1 tablet (4 mg) by mouth 3 times daily 90 tablet      traZODone (DESYREL) 100 MG tablet TAKE 1 TABLET(100 MG) BY MOUTH AT BEDTIME 90 tablet 1     traZODone (DESYREL) 100 MG tablet TAKE 1 TABLET(100 MG) BY MOUTH AT BEDTIME 90 tablet 1     OTC products: None, except as noted above    Allergies   Allergen Reactions     Povidone Iodine      \"mild skin irritation\" per patient report     Toradol [Ketorolac] Other (See Comments)     Pt gets nightmares      Latex Allergy: NO    Social History     Tobacco Use     Smoking status: Never Smoker     Smokeless tobacco: Never Used   Substance Use Topics     Alcohol use: No     History   Drug Use No       REVIEW OF SYSTEMS:   Constitutional, neuro, ENT, endocrine, pulmonary, " cardiac, gastrointestinal, genitourinary, musculoskeletal, integument and psychiatric systems are negative, except as otherwise noted.  45 minutes time spent the majority in risk stratification (new  Patient) and counselling   EXAM:   There were no vitals taken for this visit.    GENERAL APPEARANCE: healthy, alert and no distress     EYES: EOMI, PERRL     HENT: ear canals and TM's normal and nose and mouth without ulcers or lesions     NECK: no adenopathy, no asymmetry, masses, or scars and thyroid normal to palpation     RESP: lungs clear to auscultation - no rales, rhonchi or wheezes     CV: regular rates and rhythm, normal S1 S2, no S3 or S4 and no murmur, click or rub     ABDOMEN:  soft, nontender, no HSM or masses and bowel sounds normal     MS: extremities normal- no gross deformities noted, no evidence of inflammation in joints, FROM in all extremities.     SKIN: no suspicious lesions or rashes     NEURO:uses wheel chair for leg weakness and reduced sensation      PSYCH: mentation appears normal. and affect normal/bright     LYMPHATICS: No cervical adenopathy      DIAGNOSTICS:   EKG: appears , unchanged from previous , normal axis, normal intervals, no acute ST/T changes c/w ischemia, no LVH by voltage criteria, unchanged from previous tracings    Recent Labs   Lab Test 01/29/19  1050 08/15/18  1224 02/15/18  1504 12/10/17  1628  07/26/17  1532  04/18/16  1528   HGB  --   --  16.7* 16.6*  --  14.5   < > 15.5   PLT  --   --  244 343  --  211   < > 198   INR  --   --   --   --   --  0.97  --  1.00    139 133 133  --  136   < > 135   POTASSIUM 4.4 4.5 4.7 4.2   < > 4.7   < > 4.3   CR 0.79 0.77 0.75 0.85  --  0.80   < > 0.63   A1C 6.4* 6.0* 6.8*  --    < >  --    < > 7.9*    < > = values in this interval not displayed.        IMPRESSION:   Reason for surgery/procedure:cystoscopy and suprapubic catheter change   Diagnosis/reason for consult: (Z01.818) Preop general physical exam  (primary encounter  diagnosis)  Comment:   Plan: **CBC with platelets differential FUTURE 2mo,         Basic metabolic panel  (Ca, Cl, CO2, Creat,         Gluc, K, Na, BUN), EKG 12-lead complete w/read         - Clinics, UA with Microscopic, levalbuterol         (XOPENEX) 1.25 MG/3ML neb solution            (J45.41) Moderate persistent asthma with acute exacerbation  Comment:   Plan: albuterol (PROVENTIL) (2.5 MG/3ML) 0.083% neb         solution, levalbuterol (XOPENEX) 1.25 MG/3ML         neb solution        Good air entry, meds effective   The proposed surgical procedure is considered LOW risk.    REVISED CARDIAC RISK INDEX  The patient has the following serious cardiovascular risks for perioperative complications such as (MI, PE, VFib and 3  AV Block):  No serious cardiac risks  INTERPRETATION: 1 risks: Class II (low risk - 0.9% complication rate)    The patient has the following additional risks for perioperative complications:  No identified additional risks      ICD-10-CM    1. Preop general physical exam Z01.818            Signed Electronically by: Mike Weiner MD    Copy of this evaluation report is provided to requesting physician.    [place] Preop Guidelines    Revised Cardiac Risk Index

## 2019-03-12 LAB
ANION GAP SERPL CALCULATED.3IONS-SCNC: 5 MMOL/L (ref 3–14)
BACTERIA SPEC CULT: NORMAL
BUN SERPL-MCNC: 24 MG/DL (ref 7–30)
CALCIUM SERPL-MCNC: 10.7 MG/DL (ref 8.5–10.1)
CHLORIDE SERPL-SCNC: 97 MMOL/L (ref 94–109)
CO2 SERPL-SCNC: 33 MMOL/L (ref 20–32)
CREAT SERPL-MCNC: 0.76 MG/DL (ref 0.52–1.04)
GFR SERPL CREATININE-BSD FRML MDRD: 84 ML/MIN/{1.73_M2}
GLUCOSE SERPL-MCNC: 109 MG/DL (ref 70–99)
POTASSIUM SERPL-SCNC: 5.1 MMOL/L (ref 3.4–5.3)
SODIUM SERPL-SCNC: 135 MMOL/L (ref 133–144)
SPECIMEN SOURCE: NORMAL

## 2019-03-12 ASSESSMENT — ASTHMA QUESTIONNAIRES: ACT_TOTALSCORE: 16

## 2019-03-12 NOTE — TELEPHONE ENCOUNTER
Sent [employer] message informing xopenex sent  Ekta Mccarthy RN, BSN  Message handled by Nurse Triage.

## 2019-03-12 NOTE — TELEPHONE ENCOUNTER
Albuterol is not covered under her insurance  Pt has Medicare and BCBS  According to Formulary migue, the plan covers ProAir HFA, Proair RespiClick, Serevent Diskus, Ventolin HFA, Xopenex, Terbutaline    Route to provider to review and advise    Shaniqua Nelson RN Nurse Triage

## 2019-03-13 ENCOUNTER — MYC MEDICAL ADVICE (OUTPATIENT)
Dept: FAMILY MEDICINE | Facility: CLINIC | Age: 64
End: 2019-03-13

## 2019-03-13 DIAGNOSIS — J45.41 MODERATE PERSISTENT ASTHMA WITH ACUTE EXACERBATION: ICD-10-CM

## 2019-03-13 DIAGNOSIS — N39.0 URINARY TRACT INFECTION: Primary | ICD-10-CM

## 2019-03-13 DIAGNOSIS — Z01.818 PREOP GENERAL PHYSICAL EXAM: ICD-10-CM

## 2019-03-13 RX ORDER — NITROFURANTOIN 25; 75 MG/1; MG/1
100 CAPSULE ORAL 2 TIMES DAILY
Qty: 6 CAPSULE | Refills: 0 | Status: SHIPPED | OUTPATIENT
Start: 2019-03-13 | End: 2019-12-29

## 2019-03-13 NOTE — TELEPHONE ENCOUNTER
See [employer] message, called [facility], have not received any PA issue, see earlier request, informs Xopenex should be covered, informs no issue, sent [employer] message informing pt  Ekta Mccarthy RN, BSN  Message handled by Nurse Triage.

## 2019-03-14 ENCOUNTER — ANESTHESIA EVENT (OUTPATIENT)
Dept: SURGERY | Facility: AMBULATORY SURGERY CENTER | Age: 64
End: 2019-03-14

## 2019-03-14 NOTE — TELEPHONE ENCOUNTER
Requested Prescriptions   Pending Prescriptions Disp Refills     levalbuterol (XOPENEX) 1.25 MG/3ML neb solution [Pharmacy Med Name: LEVALBUTEROL 1.25MG/3ML NEB 25X3ML] 825 mL 1     Sig: TAKE ONE VIAL BY NEBULIZATION EVERY 8 HOURS AS NEEDED FOR SHORTNESS OF BREATH/DYSPNEA OR WHEEZING    There is no refill protocol information for this order        levalbuterol (XOPENEX) 1.25 MG/3ML neb solution   Last Written Prescription Date: 3/11/19   Last Fill Quantity: 60ml,   # refills: 1  Last Office Visit: Regine Shahid Office visit:       Routing refill request to provider for review/approval because:  Drug not on the [facility] or [facility] refill protocol or controlled substance

## 2019-03-15 ENCOUNTER — ANESTHESIA (OUTPATIENT)
Dept: SURGERY | Facility: AMBULATORY SURGERY CENTER | Age: 64
End: 2019-03-15

## 2019-03-15 ENCOUNTER — HOSPITAL ENCOUNTER (OUTPATIENT)
Facility: AMBULATORY SURGERY CENTER | Age: 64
End: 2019-03-15
Attending: UROLOGY
Payer: MEDICARE

## 2019-03-15 VITALS
SYSTOLIC BLOOD PRESSURE: 118 MMHG | RESPIRATION RATE: 16 BRPM | BODY MASS INDEX: 40.65 KG/M2 | TEMPERATURE: 99 F | HEIGHT: 67 IN | WEIGHT: 259 LBS | DIASTOLIC BLOOD PRESSURE: 72 MMHG | OXYGEN SATURATION: 91 %

## 2019-03-15 LAB — GLUCOSE BLDC GLUCOMTR-MCNC: 153 MG/DL (ref 70–99)

## 2019-03-15 RX ORDER — FENTANYL CITRATE 50 UG/ML
25-50 INJECTION, SOLUTION INTRAMUSCULAR; INTRAVENOUS
Status: DISCONTINUED | OUTPATIENT
Start: 2019-03-15 | End: 2019-03-15 | Stop reason: HOSPADM

## 2019-03-15 RX ORDER — ONDANSETRON 4 MG/1
4 TABLET, ORALLY DISINTEGRATING ORAL EVERY 30 MIN PRN
Status: DISCONTINUED | OUTPATIENT
Start: 2019-03-15 | End: 2019-03-16 | Stop reason: HOSPADM

## 2019-03-15 RX ORDER — LIDOCAINE HYDROCHLORIDE 20 MG/ML
INJECTION, SOLUTION INFILTRATION; PERINEURAL PRN
Status: DISCONTINUED | OUTPATIENT
Start: 2019-03-15 | End: 2019-03-15

## 2019-03-15 RX ORDER — GABAPENTIN 100 MG/1
100 CAPSULE ORAL ONCE
Status: DISCONTINUED | OUTPATIENT
Start: 2019-03-15 | End: 2019-03-15 | Stop reason: HOSPADM

## 2019-03-15 RX ORDER — NALOXONE HYDROCHLORIDE 0.4 MG/ML
.1-.4 INJECTION, SOLUTION INTRAMUSCULAR; INTRAVENOUS; SUBCUTANEOUS
Status: DISCONTINUED | OUTPATIENT
Start: 2019-03-15 | End: 2019-03-16 | Stop reason: HOSPADM

## 2019-03-15 RX ORDER — PROPOFOL 10 MG/ML
INJECTION, EMULSION INTRAVENOUS CONTINUOUS PRN
Status: DISCONTINUED | OUTPATIENT
Start: 2019-03-15 | End: 2019-03-15

## 2019-03-15 RX ORDER — MEPERIDINE HYDROCHLORIDE 25 MG/ML
12.5 INJECTION INTRAMUSCULAR; INTRAVENOUS; SUBCUTANEOUS
Status: DISCONTINUED | OUTPATIENT
Start: 2019-03-15 | End: 2019-03-16 | Stop reason: HOSPADM

## 2019-03-15 RX ORDER — LIDOCAINE 40 MG/G
CREAM TOPICAL
Status: DISCONTINUED | OUTPATIENT
Start: 2019-03-15 | End: 2019-03-15 | Stop reason: HOSPADM

## 2019-03-15 RX ORDER — OXYCODONE HYDROCHLORIDE 5 MG/1
5 TABLET ORAL EVERY 4 HOURS PRN
Status: DISCONTINUED | OUTPATIENT
Start: 2019-03-15 | End: 2019-03-16 | Stop reason: HOSPADM

## 2019-03-15 RX ORDER — ACETAMINOPHEN 325 MG/1
975 TABLET ORAL ONCE
Status: COMPLETED | OUTPATIENT
Start: 2019-03-15 | End: 2019-03-15

## 2019-03-15 RX ORDER — LEVALBUTEROL INHALATION SOLUTION 1.25 MG/3ML
SOLUTION RESPIRATORY (INHALATION)
Qty: 825 ML | Refills: 1 | Status: ON HOLD | COMMUNITY
Start: 2019-03-15 | End: 2020-03-16

## 2019-03-15 RX ORDER — CEFAZOLIN SODIUM 2 G/50ML
2 SOLUTION INTRAVENOUS
Status: COMPLETED | OUTPATIENT
Start: 2019-03-15 | End: 2019-03-15

## 2019-03-15 RX ORDER — SODIUM CHLORIDE, SODIUM LACTATE, POTASSIUM CHLORIDE, CALCIUM CHLORIDE 600; 310; 30; 20 MG/100ML; MG/100ML; MG/100ML; MG/100ML
INJECTION, SOLUTION INTRAVENOUS CONTINUOUS
Status: DISCONTINUED | OUTPATIENT
Start: 2019-03-15 | End: 2019-03-15 | Stop reason: HOSPADM

## 2019-03-15 RX ORDER — PROPOFOL 10 MG/ML
INJECTION, EMULSION INTRAVENOUS PRN
Status: DISCONTINUED | OUTPATIENT
Start: 2019-03-15 | End: 2019-03-15

## 2019-03-15 RX ORDER — SODIUM CHLORIDE, SODIUM LACTATE, POTASSIUM CHLORIDE, CALCIUM CHLORIDE 600; 310; 30; 20 MG/100ML; MG/100ML; MG/100ML; MG/100ML
INJECTION, SOLUTION INTRAVENOUS CONTINUOUS
Status: DISCONTINUED | OUTPATIENT
Start: 2019-03-15 | End: 2019-03-16 | Stop reason: HOSPADM

## 2019-03-15 RX ORDER — CEFAZOLIN SODIUM 1 G/50ML
1 SOLUTION INTRAVENOUS SEE ADMIN INSTRUCTIONS
Status: DISCONTINUED | OUTPATIENT
Start: 2019-03-15 | End: 2019-03-15 | Stop reason: HOSPADM

## 2019-03-15 RX ORDER — KETAMINE HYDROCHLORIDE 10 MG/ML
INJECTION, SOLUTION INTRAMUSCULAR; INTRAVENOUS PRN
Status: DISCONTINUED | OUTPATIENT
Start: 2019-03-15 | End: 2019-03-15

## 2019-03-15 RX ORDER — ONDANSETRON 2 MG/ML
INJECTION INTRAMUSCULAR; INTRAVENOUS PRN
Status: DISCONTINUED | OUTPATIENT
Start: 2019-03-15 | End: 2019-03-15

## 2019-03-15 RX ORDER — ONDANSETRON 2 MG/ML
4 INJECTION INTRAMUSCULAR; INTRAVENOUS EVERY 30 MIN PRN
Status: DISCONTINUED | OUTPATIENT
Start: 2019-03-15 | End: 2019-03-16 | Stop reason: HOSPADM

## 2019-03-15 RX ADMIN — PROPOFOL 30 MG: 10 INJECTION, EMULSION INTRAVENOUS at 08:03

## 2019-03-15 RX ADMIN — PROPOFOL 50 MCG/KG/MIN: 10 INJECTION, EMULSION INTRAVENOUS at 08:03

## 2019-03-15 RX ADMIN — ACETAMINOPHEN 975 MG: 325 TABLET ORAL at 07:29

## 2019-03-15 RX ADMIN — ONDANSETRON 4 MG: 2 INJECTION INTRAMUSCULAR; INTRAVENOUS at 08:00

## 2019-03-15 RX ADMIN — LIDOCAINE HYDROCHLORIDE 20 MG: 20 INJECTION, SOLUTION INFILTRATION; PERINEURAL at 08:05

## 2019-03-15 RX ADMIN — CEFAZOLIN SODIUM 2 G: 2 SOLUTION INTRAVENOUS at 08:01

## 2019-03-15 RX ADMIN — SODIUM CHLORIDE, SODIUM LACTATE, POTASSIUM CHLORIDE, CALCIUM CHLORIDE: 600; 310; 30; 20 INJECTION, SOLUTION INTRAVENOUS at 07:29

## 2019-03-15 RX ADMIN — KETAMINE HYDROCHLORIDE 30 MG: 10 INJECTION, SOLUTION INTRAMUSCULAR; INTRAVENOUS at 08:06

## 2019-03-15 ASSESSMENT — MIFFLIN-ST. JEOR: SCORE: 1767.45

## 2019-03-15 NOTE — OP NOTE
PRE-OPERATIVE DIAGNOSIS:   1.  Neurogenic bladder    POST-OPERATIVE DIAGNOSIS:  1.  Neurogenic bladder    PROCEDURE:    1. Cystourethroscopy.   2. injection of botulinum toxin A 200units in 20cc sterile saline  3. Suprapubic catheter exchange      SURGEON:  Yoandy Rios MD; available for the entire case, present for key portions of the procedure    RESIDENT:  Ambrosio Downing MD  ANESTHESIA:  MAC    ESTIMATED BLOOD LOSS:  5 mL    DRAINS:  None    SIGNIFICANT FINDINGS: None    OPERATIVE INDICATIONS:  Bhavani White is a 63 year old female with neurogenic bladder. She elects to proceed with botulinum toxin injection understanding the risks for urinary retention, infection, pain, bleeding, need for future procedures and risks of anesthesia.     OPERATIVE DETAILS:  The patient was taken to the operating room in her usual state of health.   She was positioned in modified dorsal lithotomy with yellowfin stirrups.  Her genitalia were prepped and draped in the standard fashion. A time-out was performed to ensure proper patient, procedure and positioning.  The patient received appropriate IV antibiotics prior to the procedure.    A 21-Upper sorbian Valdovinos injection cystoscope was placed into the bladder.  The bladder mucosa appeared to be normal without stones, tumors or diverticulum on 360 degree inspection. The ureteral orifices were in the normal orthotopic position bilaterally.  We mixed 2 vials of 100 U botulinum toxin A into 20 mL of injectable saline for a total of (10 units/mL).  We performed a template injection procedure with 5 columns of 4 injections. All injections were placed deep to the mucosa into the detrusor muscle.  We then emptied her bladder to re-inspect our injection sites and found that there was a minimal amount of blood. Her bladder was then emptied again. Her 24F SP tube was then replaced and 10ml were instilled into the balloon. The patient was returned to supine position and transported to recovery in stable  condition.      PLAN: Continue routine botox

## 2019-03-15 NOTE — ANESTHESIA CARE TRANSFER NOTE
Patient: Bhavani White    Procedure(s):  Cystoscopy, suprapubic tube exchange  Inject Botox into Bladder    Diagnosis: Neurogenic Bladder  Diagnosis Additional Information: No value filed.    Anesthesia Type:   No value filed.     Note:  Airway :Nasal Cannula  Patient transferred to:Phase II  Comments: Uneventful transport to Phase 2 with NC 4 lpm; off in Phase 2; IV patent; Pt responds appropriately to command; Pt comfortable; VSS; Report to THANG Almazan Report: Identifed the Patient, Identified the Reponsible Provider, Reviewed the pertinent medical history, Discussed the surgical course, Reviewed Intra-OP anesthesia mangement and issues during anesthesia, Set expectations for post-procedure period and Allowed opportunity for questions and acknowledgement of understanding      Vitals: (Last set prior to Anesthesia Care Transfer)    CRNA VITALS  3/15/2019 0800 - 3/15/2019 0834      3/15/2019             Resp Rate (set):  10                Electronically Signed By: ELISSA PACKER CRNA  March 15, 2019  8:34 AM

## 2019-03-15 NOTE — ANESTHESIA PREPROCEDURE EVALUATION
Anesthesia Pre-Procedure Evaluation    Patient: Bhavani White   MRN:     [id number] Gender:   female   Age:    63 year old :      1955        Preoperative Diagnosis: Neurogenic Bladder   Procedure(s):  Cystoscopy  Inject Botox into Bladder     Past Medical History:   Diagnosis Date     Anxiety      Asthma      Depression      DM (diabetes mellitus) (H)      Frequent UTI      History of blood transfusion      History of ulcer disease 2014    She notes from NSAIDs; nearly . Discussed a hospitalization at [facility] for this.     Hypertension      Hypothyroid      Iatrogenic Cushing's disease (H)     off prednisone now     Morbid obesity (H)      MRSA (methicillin resistant staph aureus) culture positive 2012    repeat cx 10/12/2012 no staph mentioned.     Osteoarthritis     multiple joings.     Other chronic pain      Sleep apnea     No longer uses cpap.      Past Surgical History:   Procedure Laterality Date     ABDOMEN SURGERY       C LAMINECTOMY,FACETECTOMY,LUMBAR  1982     C TOTAL KNEE ARTHROPLASTY      left     CYSTOSCOPY N/A 2018    Procedure: CYSTOSCOPY;  Cystoscopy and Botox Injection Into the Bladder and suprapubic tube exchange;  Surgeon: Yoandy Rios MD;  Location: [facility]     CYSTOSCOPY, INTRAVESICAL INJECTION N/A 2017    Procedure: CYSTOSCOPY, INTRAVESICAL INJECTION;  Cystoscopy, Botox Injection Into The Bladder  Latex Allergy ;  Surgeon: Yoandy Rios MD;  Location: [facility] OR     CYSTOSCOPY, INTRAVESICAL INJECTION N/A 3/8/2018    Procedure: CYSTOSCOPY, INTRAVESICAL INJECTION;  Cystoscopy, Botox Injection Into The Bladder and suprapubic catheter exchange;  Surgeon: Yoandy Rios MD;  Location: [facility] OR     DISCECTOMY, FUSION CERVICAL ANTERIOR THREE+ LEVELS, COMBINED Left 5/3/2016    Procedure: COMBINED DISCECTOMY, FUSION CERVICAL ANTERIOR THREE+ LEVELS;  Surgeon: Jasvir Torres MD;  Location: [facility] OR     GENITOURINARY SURGERY      suprapubic  catheter     HERNIA REPAIR  1957    double hernia     HYSTERECTOMY, PAP NO LONGER INDICATED      CELIA and large ovarian tumor removed     INJECT BOTOX N/A 9/21/2018    Procedure: INJECT BOTOX;;  Surgeon: Yoandy Rios MD;  Location:  OR     SURGICAL HISTORY OF -       left cataract surgery     SURGICAL HISTORY OF -   12/14    right cataract surgery and left laser revision     SURGICAL HISTORY OF -   March 2015    left carpal tunnel release     TONSILLECTOMY  1974          Anesthesia Evaluation     . Pt has had prior anesthetic.     No history of anesthetic complications          ROS/MED HX    ENT/Pulmonary:     (+)sleep apnea, Intermittent asthma doesn't use CPAP , . .    Neurologic:       Cardiovascular:     (+) hypertension----. : . . . :. . Previous cardiac testing Echodate:results:REST ECG-NSR with mixed IVCD: iLBBB, RBBB  Left ventricular hypertrophy visualized by 2-D imaging.  REST Echo: EF >65%, prox septal thickening/LVH. No LV outflow obstruction at  rest. LIMITED IMAGE QUALITY THROUGHOUT STUDY DUE TO PATIENT BODY HABITUS.  THIS WILL REDUCE SENSITIVITY OF TEST FOR DIAGNOSIS OF CAD  Patient noted chest pain with dobutamine, but there were no ST segment  changes and the echo pictures show hyperdynamic LV with cavity obliteration,  EF>70% and no regional wall motion abnormalities. No ischemia identified on  this test. (limited image quality due to patient body habitus)date: results:ECG reviewed date:03/11/2019 results:RBBB date: results:          METS/Exercise Tolerance: Comment: Uses wheelchair    Hematologic:     (+) History of Transfusion no previous transfusion reaction -      Musculoskeletal:   (+) arthritis, , , -       GI/Hepatic:     (+) Other GI/Hepatic h/o ulcer disease      Renal/Genitourinary:  - ROS Renal section negative       Endo:     (+) thyroid problem hypothyroidism, Obesity, .      Psychiatric:     (+) psychiatric history anxiety and depression      Infectious Disease:   (+) MRSA,  "      Malignancy:         Other:    (+) H/O Chronic Pain,H/O chronic opiod use ,                        PHYSICAL EXAM:   Mental Status/Neuro: A/A/O   Airway: Facies: Feasible  Mallampati: II  Mouth/Opening: Full  TM distance: > 6 cm  Neck ROM: Full   Respiratory: Auscultation: CTAB     Resp. Rate: Normal     Resp. Effort: Normal      CV: Rhythm: Regular  Rate: Age appropriate  Heart: Normal Sounds   Comments:      Dental: Normal                  Lab Results   Component Value Date    WBC 10.2 03/11/2019    HGB 17.5 (H) 03/11/2019    HCT 52.7 (H) 03/11/2019     03/11/2019    CRP 30.0 (H) 05/16/2016    SED 11 03/04/2010     03/11/2019    POTASSIUM 5.1 03/11/2019    CHLORIDE 97 03/11/2019    CO2 33 (H) 03/11/2019    BUN 24 03/11/2019    CR 0.76 03/11/2019     (H) 03/11/2019    DORY 10.7 (H) 03/11/2019    MAG 2.5 (H) 01/19/2009    ALBUMIN 3.5 01/29/2019    PROTTOTAL 7.8 01/29/2019    ALT 30 01/29/2019    AST 23 01/29/2019    ALKPHOS 82 01/29/2019    BILITOTAL 0.4 01/29/2019    LIPASE 98 12/10/2017    AMYLASE 44 01/08/2009    PTT 25 04/18/2016    INR 0.97 07/26/2017    FIBR 576 (H) 01/19/2009    TSH 0.28 (L) 08/15/2018    T4 1.03 08/15/2018       Preop Vitals  BP Readings from Last 3 Encounters:   03/15/19 (!) 131/102   03/11/19 (!) 165/91   12/13/18 122/58    Pulse Readings from Last 3 Encounters:   03/11/19 101   12/13/18 72   11/21/18 76      Resp Readings from Last 3 Encounters:   03/15/19 18   03/11/19 14   12/13/18 16    SpO2 Readings from Last 3 Encounters:   03/15/19 92%   03/11/19 95%   12/13/18 95%      Temp Readings from Last 1 Encounters:   03/15/19 37  C (98.6  F) (Oral)    Ht Readings from Last 1 Encounters:   03/15/19 1.702 m (5' 7\")      Wt Readings from Last 1 Encounters:   03/15/19 117.5 kg (259 lb)    Estimated body mass index is 40.57 kg/m  as calculated from the following:    Height as of this encounter: 1.702 m (5' 7\").    Weight as of this encounter: 117.5 kg (259 lb). "     LDA:  Peripheral IV 03/15/19 Right Hand (Active)   Site Assessment WDL 3/15/2019  7:14 AM   Phlebitis Scale 0-->no symptoms 3/15/2019  7:14 AM   Infiltration Scale 0 3/15/2019  7:14 AM   Infiltration Site Treatment Method  None 3/15/2019  7:14 AM   Extravasation? No 3/15/2019  7:14 AM   Dressing Intervention New dressing  3/15/2019  7:14 AM   Number of days: 0       Suprapubic Catheter Double-lumen;Non-latex 24 fr (Active)   Number of days: 175            Assessment:   ASA SCORE: 3    NPO Status: > 6 hours since completed Solid Foods   Documentation: H&P complete; Preop Testing complete; Consents complete   Proceeding: Proceed without further delay  Tobacco Use:  NO Active use of Tobacco/UNKNOWN Tobacco use status     Plan:   Anes. Type:  MAC   Pre-Induction: Acetaminophen PO   Induction:  IV (Standard)   Airway: Native Airway   Access/Monitoring: PIV   Maintenance: Propofol; IV   Emergence: Procedure Site   Logistics: Same Day Surgery     Postop Pain/Sedation Strategy:  Standard-Options: Opioids PRN     PONV Management:  Adult Risk Factors: Female, Non-Smoker, Postop Opioids  Prevention: Ondansetron; Dexamethasone     CONSENT: Direct conversation   Plan and risks discussed with: Patient   Blood Products: N/a                         Ervin Victoria MD

## 2019-03-15 NOTE — TELEPHONE ENCOUNTER
Auto fax for 90 day fill, denied, prn usage, will see pt frequency, maybe 3 month supply now  Ekta Mccarthy RN, BSN  Message handled by Nurse Triage.

## 2019-03-15 NOTE — ANESTHESIA POSTPROCEDURE EVALUATION
Anesthesia POST Procedure Evaluation    Patient: Bhavani White   MRN:     [id number] Gender:   female   Age:    63 year old :      1955        Preoperative Diagnosis: Neurogenic Bladder   Procedure(s):  Cystoscopy, suprapubic tube exchange  Inject Botox into Bladder   Postop Comments: No value filed.       Anesthesia Type:  MAC    Reportable Event: NO     PAIN: Uncomplicated   Sign Out status: Comfortable, Well controlled pain     PONV: No PONV   Sign Out status:  No Nausea or Vomiting     Neuro/Psych: Uneventful perioperative course   Sign Out Status: Preoperative baseline; Age appropriate mentation     Airway/Resp.: Uneventful perioperative course   Sign Out Status: Non labored breathing, age appropriate RR; Resp. Status within EXPECTED Parameters     CV: Uneventful perioperative course   Sign Out status: Appropriate BP and perfusion indices; Appropriate HR/Rhythm     Disposition:   Sign Out in:  PACU  Disposition:  Phase II; Home  Recovery Course: Uneventful  Follow-Up: Not required           Last Anesthesia Record Vitals:  CRNA VITALS  3/15/2019 0800 - 3/15/2019 0900      3/15/2019             Resp Rate (observed):  18          Last PACU/Preop Vitals:  Vitals:    03/15/19 0649 03/15/19 0834 03/15/19 0855   BP: (!) 131/102 112/63 118/72   Resp: 18 16 16   Temp: 37  C (98.6  F) 37.2  C (99  F)    SpO2: 92% 95% 91%         Electronically Signed By: Ervin Victoria MD, March 15, 2019, 9:42 AM

## 2019-03-15 NOTE — DISCHARGE INSTRUCTIONS
[facility]  Home Care Following Anesthesia  For 24 hours after surgery:  1. Get plenty of rest.  A responsible adult must stay with you for at least 24 hours after you leave the surgery center.  2. Do not drive or use heavy equipment.  If you have weakness or tingling, don't drive or use heavy equipment until this feeling goes away.   3. Do not drink alcohol.   4. Avoid strenuous or risky activities.  Ask for help when climbing stairs.  5. You may feel lightheaded.  IF so, sit for a few minutes before standing.  Have someone help you get up.   6. If you have nausea (feel sick to your stomach): Drink only clear liquids such as apple juice, ginger ale, broth or 7-Up.  Rest may also help.  Be sure to drink enough fluids.  Move to a regular diet as you feel able.   7. You may have a slight fever.  Call the doctor if your fever is over 100 F (37.7 C) (taken under the tongue) or lasts longer than 24 hours.  8. You may have a dry mouth, a sore throat, muscle aches or trouble sleeping. These should go away after 24 hours.  9. Do not make important or legal decisions.               Tips for taking pain medications  To get the best pain relief possible, remember these points:    Take pain medications as directed, before pain becomes severe.    Pain medication can upset your stomach: taking it with food may help.    Constipation is a common side effect of pain medication. Drink plenty of  fluids.    Eat foods high in fiber. Take a stool softener if recommended by your doctor or pharmacist.    Do not drink alcohol, drive or operate machinery while taking pain medications.    Ask about other ways to control pain, such as with heat, ice or relaxation.    Tylenol/Acetaminophen Consumption  To help encourage the safe use of acetaminophen, the makers of TYLENOL  have lowered the maximum daily dose for single-ingredient Extra Strength TYLENOL  (acetaminophen) products sold in the [place] from 8  pills per day (4,000 mg) to 6 pills per day (3,000 mg). The dosing interval has also changed from 2 pills every 4-6 hours to 2 pills every 6 hours.    If you feel your pain relief is insufficient, you may take Tylenol/Acetaminophen in addition to your narcotic pain medication.     Be careful not to exceed 3,000 mg of Tylenol/Acetaminophen in a 24 hour period from all sources.    If you are taking extra strength Tylenol/acetaminophen (500 mg), the maximum dose is 6 tablets in 24 hours.    If you are taking regular strength acetaminophen (325 mg), the maximum dose is 9 tablets in 24 hours.    Tylenol 975mg given at 0730mg.  Next dose ok to give at 1:30pm today, follow bottle instructions.    Call a doctor for any of the followin. Signs of infection (fever, growing tenderness at the surgery site, a large amount of drainage or bleeding, severe pain, foul-smelling drainage, redness, swelling).  2. It has been over 8 to 10 hours since surgery and you are still not able to urinate (pass water).  3. Headache for over 24 hours.  4. Numbness, tingling or weakness the day after surgery (if you had spinal anesthesia).      Your doctor is:  Dr. Yoandy Lisa, Prostate and Urology: [phone]                  Or dial [phone] and ask for the resident on call for:  Prostate Urology  For emergency care, call the:  [place]:  [phone] (TTY for hearing impaired: [phone])

## 2019-03-26 ENCOUNTER — TELEPHONE (OUTPATIENT)
Dept: FAMILY MEDICINE | Facility: CLINIC | Age: 64
End: 2019-03-26

## 2019-03-26 NOTE — TELEPHONE ENCOUNTER
Fax from [facility]'s, need PARISH VASQUEZ MD to sign and add ICD codes form xopenex, at bronze, please sign and give to TC to fax  Ekta Mccarthy RN, BSN  Message handled by Nurse Triage.

## 2019-04-19 DIAGNOSIS — J45.41 MODERATE PERSISTENT ASTHMA WITH ACUTE EXACERBATION: ICD-10-CM

## 2019-04-22 RX ORDER — ALBUTEROL SULFATE 90 UG/1
AEROSOL, METERED RESPIRATORY (INHALATION)
Qty: 18 G | Refills: 0 | Status: SHIPPED | OUTPATIENT
Start: 2019-04-22 | End: 2019-07-01

## 2019-04-22 NOTE — TELEPHONE ENCOUNTER
"Requested Prescriptions   Pending Prescriptions Disp Refills     albuterol (PROAIR HFA/PROVENTIL HFA/VENTOLIN HFA) 108 (90 Base) MCG/ACT inhaler [Pharmacy Med Name: ALBUTEROL HFA INH (200 PUFFS) 18GM]  Last Written Prescription Date:  3/6/2019  Last Fill Quantity: 18g,  # refills: 1   Last office visit: 3/11/2019 with prescribing provider:  Regine   Future Office Visit:     18 g 0     Sig: INHALE 2 PUFFS INTO THE LUNGS EVERY 6 HOURS AS NEEDED FOR SHORTNESS OF BREATH       Asthma Maintenance Inhalers - Anticholinergics Failed - 4/19/2019  6:19 PM        Failed - Asthma control assessment score within normal limits in last 6 months     ACT Total Scores 2/15/2018 8/15/2018 3/11/2019   ACT TOTAL SCORE - - -   ASTHMA ER VISITS - - -   ASTHMA HOSPITALIZATIONS - - -   ACT TOTAL SCORE (Goal Greater than or Equal to 20) 15 20 16   In the past 12 months, how many times did you visit the emergency room for your asthma without being admitted to the hospital? 0 0 0   In the past 12 months, how many times were you hospitalized overnight because of your asthma? 0 0 0               Passed - Patient is age 12 years or older        Passed - Medication is active on med list        Passed - Recent (6 mo) or future (30 days) visit within the authorizing provider's specialty     Patient had office visit in the last 6 months or has a visit in the next 30 days with authorizing provider or within the authorizing provider's specialty.  See \"Patient Info\" tab in inbasket, or \"Choose Columns\" in Meds & Orders section of the refill encounter.              "

## 2019-04-22 NOTE — TELEPHONE ENCOUNTER
Routing refill request to provider for review/approval because:  ACT not at goal.     Amanda Mota RN -- [facility]

## 2019-04-26 ENCOUNTER — APPOINTMENT (OUTPATIENT)
Dept: GENERAL RADIOLOGY | Facility: CLINIC | Age: 64
End: 2019-04-26
Attending: EMERGENCY MEDICINE
Payer: MEDICARE

## 2019-04-26 ENCOUNTER — APPOINTMENT (OUTPATIENT)
Dept: CT IMAGING | Facility: CLINIC | Age: 64
End: 2019-04-26
Attending: EMERGENCY MEDICINE
Payer: MEDICARE

## 2019-04-26 ENCOUNTER — HOSPITAL ENCOUNTER (EMERGENCY)
Facility: CLINIC | Age: 64
Discharge: HOME | End: 2019-04-26
Attending: EMERGENCY MEDICINE | Admitting: EMERGENCY MEDICINE
Payer: MEDICARE

## 2019-04-26 VITALS
SYSTOLIC BLOOD PRESSURE: 122 MMHG | DIASTOLIC BLOOD PRESSURE: 83 MMHG | OXYGEN SATURATION: 94 % | RESPIRATION RATE: 20 BRPM | HEART RATE: 65 BPM | TEMPERATURE: 98.1 F

## 2019-04-26 DIAGNOSIS — N39.0 COMPLICATED UTI (URINARY TRACT INFECTION): ICD-10-CM

## 2019-04-26 DIAGNOSIS — R10.84 ABDOMINAL PAIN, GENERALIZED: ICD-10-CM

## 2019-04-26 LAB
ALBUMIN UR-MCNC: 20 MG/DL
ANION GAP SERPL CALCULATED.3IONS-SCNC: 3 MMOL/L (ref 3–14)
APPEARANCE UR: CLEAR
BACTERIA #/AREA URNS HPF: ABNORMAL /HPF
BASOPHILS # BLD AUTO: 0 10E9/L (ref 0–0.2)
BASOPHILS NFR BLD AUTO: 0.3 %
BILIRUB UR QL STRIP: NEGATIVE
BUN SERPL-MCNC: 39 MG/DL (ref 7–30)
CALCIUM SERPL-MCNC: 9.4 MG/DL (ref 8.5–10.1)
CHLORIDE SERPL-SCNC: 93 MMOL/L (ref 94–109)
CO2 SERPL-SCNC: 36 MMOL/L (ref 20–32)
COLOR UR AUTO: ABNORMAL
CREAT SERPL-MCNC: 0.85 MG/DL (ref 0.52–1.04)
DIFFERENTIAL METHOD BLD: ABNORMAL
EOSINOPHIL # BLD AUTO: 0 10E9/L (ref 0–0.7)
EOSINOPHIL NFR BLD AUTO: 0.1 %
ERYTHROCYTE [DISTWIDTH] IN BLOOD BY AUTOMATED COUNT: 13 % (ref 10–15)
GFR SERPL CREATININE-BSD FRML MDRD: 73 ML/MIN/{1.73_M2}
GLUCOSE SERPL-MCNC: 199 MG/DL (ref 70–99)
GLUCOSE UR STRIP-MCNC: NEGATIVE MG/DL
HCT VFR BLD AUTO: 52.8 % (ref 35–47)
HGB BLD-MCNC: 17.1 G/DL (ref 11.7–15.7)
HGB UR QL STRIP: NEGATIVE
HYALINE CASTS #/AREA URNS LPF: 1 /LPF (ref 0–2)
IMM GRANULOCYTES # BLD: 0 10E9/L (ref 0–0.4)
IMM GRANULOCYTES NFR BLD: 0.3 %
KETONES UR STRIP-MCNC: NEGATIVE MG/DL
LEUKOCYTE ESTERASE UR QL STRIP: ABNORMAL
LYMPHOCYTES # BLD AUTO: 1.3 10E9/L (ref 0.8–5.3)
LYMPHOCYTES NFR BLD AUTO: 9.8 %
MCH RBC QN AUTO: 28.5 PG (ref 26.5–33)
MCHC RBC AUTO-ENTMCNC: 32.4 G/DL (ref 31.5–36.5)
MCV RBC AUTO: 88 FL (ref 78–100)
MONOCYTES # BLD AUTO: 0.9 10E9/L (ref 0–1.3)
MONOCYTES NFR BLD AUTO: 6.5 %
NEUTROPHILS # BLD AUTO: 11 10E9/L (ref 1.6–8.3)
NEUTROPHILS NFR BLD AUTO: 83 %
NITRATE UR QL: POSITIVE
NRBC # BLD AUTO: 0 10*3/UL
NRBC BLD AUTO-RTO: 0 /100
PH UR STRIP: 8.5 PH (ref 5–7)
PLATELET # BLD AUTO: 241 10E9/L (ref 150–450)
POTASSIUM SERPL-SCNC: 4.2 MMOL/L (ref 3.4–5.3)
RBC # BLD AUTO: 6 10E12/L (ref 3.8–5.2)
RBC #/AREA URNS AUTO: 2 /HPF (ref 0–2)
SODIUM SERPL-SCNC: 132 MMOL/L (ref 133–144)
SOURCE: ABNORMAL
SP GR UR STRIP: 1.03 (ref 1–1.03)
SQUAMOUS #/AREA URNS AUTO: <1 /HPF (ref 0–1)
UROBILINOGEN UR STRIP-MCNC: NORMAL MG/DL (ref 0–2)
WBC # BLD AUTO: 13.2 10E9/L (ref 4–11)
WBC #/AREA URNS AUTO: 16 /HPF (ref 0–5)

## 2019-04-26 PROCEDURE — 87186 SC STD MICRODIL/AGAR DIL: CPT | Performed by: EMERGENCY MEDICINE

## 2019-04-26 PROCEDURE — 71046 X-RAY EXAM CHEST 2 VIEWS: CPT

## 2019-04-26 PROCEDURE — A9270 NON-COVERED ITEM OR SERVICE: HCPCS | Mod: GY | Performed by: EMERGENCY MEDICINE

## 2019-04-26 PROCEDURE — 99285 EMERGENCY DEPT VISIT HI MDM: CPT | Mod: 25

## 2019-04-26 PROCEDURE — 25000132 ZZH RX MED GY IP 250 OP 250 PS 637: Mod: GY | Performed by: EMERGENCY MEDICINE

## 2019-04-26 PROCEDURE — 87086 URINE CULTURE/COLONY COUNT: CPT | Performed by: EMERGENCY MEDICINE

## 2019-04-26 PROCEDURE — 96375 TX/PRO/DX INJ NEW DRUG ADDON: CPT

## 2019-04-26 PROCEDURE — 25000128 H RX IP 250 OP 636: Performed by: EMERGENCY MEDICINE

## 2019-04-26 PROCEDURE — 74177 CT ABD & PELVIS W/CONTRAST: CPT

## 2019-04-26 PROCEDURE — 80048 BASIC METABOLIC PNL TOTAL CA: CPT | Performed by: EMERGENCY MEDICINE

## 2019-04-26 PROCEDURE — 87088 URINE BACTERIA CULTURE: CPT | Performed by: EMERGENCY MEDICINE

## 2019-04-26 PROCEDURE — 81001 URINALYSIS AUTO W/SCOPE: CPT | Performed by: EMERGENCY MEDICINE

## 2019-04-26 PROCEDURE — 96374 THER/PROPH/DIAG INJ IV PUSH: CPT | Mod: 59

## 2019-04-26 PROCEDURE — 96376 TX/PRO/DX INJ SAME DRUG ADON: CPT

## 2019-04-26 PROCEDURE — 85025 COMPLETE CBC W/AUTO DIFF WBC: CPT | Performed by: EMERGENCY MEDICINE

## 2019-04-26 RX ORDER — METOCLOPRAMIDE HYDROCHLORIDE 5 MG/ML
10 INJECTION INTRAMUSCULAR; INTRAVENOUS ONCE
Status: COMPLETED | OUTPATIENT
Start: 2019-04-26 | End: 2019-04-26

## 2019-04-26 RX ORDER — FENTANYL CITRATE 50 UG/ML
100 INJECTION, SOLUTION INTRAMUSCULAR; INTRAVENOUS ONCE
Status: COMPLETED | OUTPATIENT
Start: 2019-04-26 | End: 2019-04-26

## 2019-04-26 RX ORDER — CEPHALEXIN 500 MG/1
500 CAPSULE ORAL ONCE
Status: COMPLETED | OUTPATIENT
Start: 2019-04-26 | End: 2019-04-26

## 2019-04-26 RX ORDER — CEPHALEXIN 500 MG/1
500 CAPSULE ORAL 4 TIMES DAILY
Qty: 40 CAPSULE | Refills: 0 | Status: SHIPPED | OUTPATIENT
Start: 2019-04-26 | End: 2019-05-06

## 2019-04-26 RX ORDER — IOPAMIDOL 755 MG/ML
500 INJECTION, SOLUTION INTRAVASCULAR ONCE
Status: COMPLETED | OUTPATIENT
Start: 2019-04-26 | End: 2019-04-26

## 2019-04-26 RX ADMIN — METOCLOPRAMIDE 10 MG: 5 INJECTION, SOLUTION INTRAMUSCULAR; INTRAVENOUS at 15:49

## 2019-04-26 RX ADMIN — SODIUM CHLORIDE 65 ML: 9 INJECTION, SOLUTION INTRAVENOUS at 16:58

## 2019-04-26 RX ADMIN — FENTANYL CITRATE 100 MCG: 50 INJECTION, SOLUTION INTRAMUSCULAR; INTRAVENOUS at 16:37

## 2019-04-26 RX ADMIN — CEPHALEXIN 500 MG: 500 CAPSULE ORAL at 19:31

## 2019-04-26 RX ADMIN — HYDROMORPHONE HYDROCHLORIDE 1 MG: 1 INJECTION, SOLUTION INTRAMUSCULAR; INTRAVENOUS; SUBCUTANEOUS at 15:49

## 2019-04-26 RX ADMIN — IOPAMIDOL 100 ML: 755 INJECTION, SOLUTION INTRAVENOUS at 16:58

## 2019-04-26 RX ADMIN — FENTANYL CITRATE 100 MCG: 50 INJECTION, SOLUTION INTRAMUSCULAR; INTRAVENOUS at 19:06

## 2019-04-26 ASSESSMENT — ENCOUNTER SYMPTOMS
DIARRHEA: 0
ABDOMINAL PAIN: 1
VOMITING: 1
NAUSEA: 1
FEVER: 0

## 2019-04-26 NOTE — ED NOTES
"Pt repeatedly on call light asking for pain medicine and saying it hurts \"so bad.\" Told pt we have to wait for the pain medicine to work.   "

## 2019-04-26 NOTE — ED NOTES
Pt on call light again saying pain meds are not working. Repositioned and offered warm packs and other comfort measures.

## 2019-04-26 NOTE — ED NOTES
Pt on call light again crying saying the pain medicine isn't doing anything and she needs something more. Told pt we have to give it more time to work. Pt requesting water. Gave a couple ice chips to help her dry mouth.

## 2019-04-26 NOTE — ED TRIAGE NOTES
Pt presents to ED via EMS from home with abdominal pain and N/V. N/V started on Tuesday. Wednesday generalized abdominal pain started. Hx chronic pain and is on neurontin and methadone. Has suprapubic catheter in place. EMS gave 4mg ODT zofran PTA which pt says did not help. ABCs intact.

## 2019-04-26 NOTE — ED AVS SNAPSHOT
[facility]  [address]  [facility] [address]  Phone:  [phone]  Fax:  [phone]                                    Bhavani White   MRN: [id number]    Department:  [facility]   Date of Visit:  4/26/2019           After Visit Summary Signature Page    I have received my discharge instructions, and my questions have been answered. I have discussed any challenges I see with this plan with the nurse or doctor.    ..........................................................................................................................................  Patient/Patient Representative Signature      ..........................................................................................................................................  Patient Representative Print Name and Relationship to Patient    ..................................................               ................................................  Date                                   Time    ..........................................................................................................................................  Reviewed by Signature/Title    ...................................................              ..............................................  Date                                               Time          22EPIC Rev 08/18

## 2019-04-26 NOTE — ED PROVIDER NOTES
History     Chief Complaint:  Abdominal Pain; Nausea, Vomiting    HPI   Bhavani White is a 63 year old female with a history of DM2, HTN, neurogenic bladder who presents to the emergency department via EMS for evaluation of abdominal pain, nausea, and vomiting. The patient reports she first began experiencing symptoms on 4/23, with nausea and vomiting. She then developed abdominal pain on 4/24. Her vomiting improved yesterday, but the abdominal pain has worsened and she is still nauseated, prompting her to contact EMS. The patient denies any diarrhea or fever. She was given Zofran en route via EMS with questionable improvement in symptoms. EMS notes the patient is wheelchair bound. The patient states she last had her catheter changed last week.    Allergies:  Povidone Iodine  Toradol [Ketorolac]     Medications:    Albuterol  Aspirin  Cymbalta  Gabapentin  Xopenex  Levothyroxine  Lisinopril  Metformin  Methadone  Miralax  Macrobid   Nystatin  Zofran  Ditropan   Pyridium  Senna  Zocor   Zanaflex     Past Medical History:    Anxiety  Asthma  Depression  DM2  Neurogenic bladder  UTI  HTN  Hypothyroidism  Latrogenic Cushing's disease  Obesity  MRSA  Osteoarthritis  Chronic pain  Sleep apnea    Past Surgical History:    Abdomen surgery  Laminectomy, facetectomy, lumbar  Total knee arthroplasty left  Cystoscopy  Discectomy, fusion cervical anterior 3+ levels  Suprapubic catheter  Hernia repair  Hysterectomy  Botox injection  Bilateral cataract surgery  Left carpal tunnel release  Tonsillectomy    Family History:    Depression  HTN  Heart disease  Ankylosing spondylitis  Prostate cancer  Bladder cancer    Social History:  Presents with .  Never smoker.  Negative for alcohol use.  Marijuana use positive.  Marital Status:   [2]     Review of Systems   Constitutional: Negative for fever.   Gastrointestinal: Positive for abdominal pain, nausea and vomiting. Negative for diarrhea.   All other systems reviewed  and are negative.    Physical Exam     Patient Vitals for the past 24 hrs:   BP Temp Temp src Pulse Heart Rate Resp SpO2   04/26/19 2000 122/83 -- -- 65 -- -- 94 %   04/26/19 1945 129/64 -- -- 65 -- -- 90 %   04/26/19 1930 159/73 -- -- 74 -- -- 91 %   04/26/19 1900 (!) 121/94 -- -- 72 -- -- 95 %   04/26/19 1800 170/87 -- -- 82 -- -- 94 %   04/26/19 1745 (!) 198/92 -- -- 81 -- -- 92 %   04/26/19 1730 (!) 143/106 -- -- 75 -- -- 92 %   04/26/19 1715 -- -- -- 69 -- -- --   04/26/19 1645 (!) 171/144 -- -- 60 -- -- 92 %   04/26/19 1630 (!) 203/119 -- -- 59 -- -- --   04/26/19 1615 (!) 175/92 -- -- 87 -- -- --   04/26/19 1600 (!) 183/118 -- -- 61 -- -- --   04/26/19 1528 (!) 209/114 98.1  F (36.7  C) Oral 63 63 20 95 %     Physical Exam  Constitutional: Vital signs reviewed as above.   HENT:               Head: No external signs of trauma noted.              Eyes: Conjunctivae are normal. Pupils are equal, round, and reactive to light.   Cardiovascular:               Normal rate, regular rhythm and normal heart sounds.                Exam reveals no friction rub.                No murmur heard.  Pulmonary/Chest:               Effort normal and breath sounds normal.               No respiratory distress.               There are no wheezes.               There are no rales.   Gastrointestinal:               Soft.               Bowel sounds normal.               There is no distension.               There is generalized abdominal tenderness.               There is mild guarding.   Musculoskeletal:               Normal range of motion.               Normal Tone  Neurological: Patient is alert and oriented to person, place, and time.   Skin: Skin is warm and dry. Patient is not diaphoretic  Psychiatric: No hallucinations.    Emergency Department Course     Imaging:  Radiographic findings were communicated with the patient who voiced understanding of the findings.    CT Abdomen/Pelvis with IV contrast:   1. New small  infiltrate/atelectasis with consolidation at the right  base may be pneumonia. Adjacent trace right pleural effusion.  2. No acute findings within the abdomen or pelvis. As per radiology.    Laboratory:  UA with micro: pH 8.5 (H), Albumin 20, Nitrite Positive, Leukocyte Esterase Small, WBC 16 (H), Bacteria Few, o/w negative    CBC: WBC: 13.2 (H), HGB: 17.1 (H), PLT: 241  BMP: Glucose 199 (H), Na 132 (L), Chloride 93 (L), Carbon Dioxide 36 (H), Urea Nitrogen 39 (H), o/w WNL     Urine culture pending.    Interventions:  1549 Dilaudid 1 mg IV   Reglan 10 mg IV  1637 Fentanyl 100 mcg IV  1906 Fentanyl 100 mcg IV  1931 Keflex 500 mg PO    Emergency Department Course:  Nursing notes and vitals reviewed. 1528 I performed an exam of the patient as documented above.     IV inserted. Medicine administered as documented above. Blood drawn. This was sent to the lab for further testing, results above.    The patient was sent for a CT Abd/Pelvis while in the emergency department, findings above.     1836 I rechecked the patient and discussed the results of her workup thus far.     Findings and plan explained to the Patient. Patient discharged home with instructions regarding supportive care, medications, and reasons to return. The importance of close follow-up was reviewed. The patient was prescribed Keflex.    I personally reviewed the laboratory results with the Patient and answered all related questions prior to discharge.    Impression & Plan      Medical Decision Making:  This 63-year-old female patient presents the ED due to abdominal pain.  Please see the HPI and exam for specifics.  The patient has an indwelling suprapubic catheter due to neurogenic bladder.  This was recently changed.  Given the patient's discomfort I did order a CT of her abdomen fortunately, the CT did not show any acute intra-abdominal source for her symptoms.  The CT did raise some question of an infiltrates in the chest though the patient has not  had any productive cough, fever, shaking chills, and her x-ray did not reveal any other infiltrates.  The patient's urine is nitrite positive though it is possible her urinalysis findings are due to chronic indwelling catheter.  Given her abdominal discomfort I did elect to treat this with antibiotics though urine culture is pending.  The patient's pain improved in the ED.  She is on methadone at home and will need to follow with her pain clinic for further outpatient narcotic pain adjustments.  I will prescribe antibiotics for home and encouraged her to follow in the outpatient setting.  Anticipatory guidance given prior to discharge.    Diagnosis:    ICD-10-CM   1. Abdominal pain, generalized R10.84   2. Complicated UTI (urinary tract infection) N39.0       Disposition:  discharged to home    Discharge Medications:     Medication List      Started    cephALEXin 500 MG capsule  Commonly known as:  KEFLEX  500 mg, Oral, 4 TIMES DAILY          Paulino ENCARNACION, am serving as a scribe on 4/26/2019 at 3:32 PM to personally document services performed by Bradford Hadley DO based on my observations and the provider's statements to me.     Paulino Veliz  4/26/2019   [facility] EMERGENCY DEPARTMENT       Bradford Hadley DO  04/26/19 1084

## 2019-04-29 ENCOUNTER — TELEPHONE (OUTPATIENT)
Dept: EMERGENCY MEDICINE | Facility: CLINIC | Age: 64
End: 2019-04-29

## 2019-04-29 LAB
BACTERIA SPEC CULT: ABNORMAL
Lab: ABNORMAL
SPECIMEN SOURCE: ABNORMAL

## 2019-04-29 NOTE — RESULT ENCOUNTER NOTE
At 2:50P Consulted with Dr Mylene Sanon, [facility] Emergency Dept Provider, and she recommended adding Macrobid 100 mg PO BID for 5 days.  Have her continue the Keflex and f/u with her PCP or urologist this week or next.

## 2019-04-29 NOTE — RESULT ENCOUNTER NOTE
Final Urine Culture Report on 4/29/19.  Emergency Dept/Urgent Care discharge antibiotic prescribed: Cephalexin (Keflex) 500 mg capsule, 1 capsule (500 mg) by mouth 4 times daily for 10 days.  Bacteria #1: >100,000 colonies/mL Escherichia coli is [SUSCEPTIBLE] to antibiotic  Bacteria #2: 50,000 to 100,000 colonies/mL Enterococcus faecalis is [NOT TESTED] to antibiotic  Bacteria #3: 10,000 to 50,000 colonies/mL Methicillin resistant Staphylococcus aureus (MRSA) is [RESISTANT] to antibiotic  Recommendations in treatment per  ED Lab result protocol.

## 2019-04-29 NOTE — TELEPHONE ENCOUNTER
[facility] Emergency Department Lab result notification [Adult-Female]    [facility] ED lab result protocol used  Urine culture    Reason for call  Notify of lab results, assess symptoms,  review ED providers recommendations/discharge instructions (if necessary) and advise per ED lab result f/u protocol    Lab Result (including Rx patient on, if applicable)  Final Urine Culture Report on 4/29/19.  Emergency Dept/Urgent Care discharge antibiotic prescribed: Cephalexin (Keflex) 500 mg capsule, 1 capsule (500 mg) by mouth 4 times daily for 10 days.    Bacteria #1: >100,000 colonies/mL Escherichia coli is [SUSCEPTIBLE] to antibiotic    Bacteria #2: 50,000 to 100,000 colonies/mL Enterococcus faecalis is [NOT TESTED] to antibiotic    Bacteria #3: 10,000 to 50,000 colonies/mL Methicillin resistant Staphylococcus aureus (MRSA) is [RESISTANT] to antibiotic  Recommendations in treatment per  ED Lab result protocol.    Information table from ED Provider visit on 4/26/19  Symptoms reported at ED visit (Chief complaint, HPI) Chief Complaint:  Abdominal Pain; Nausea, Vomiting     HPI   Bhavani White is a 63 year old female with a history of DM2, HTN, neurogenic bladder who presents to the emergency department via EMS for evaluation of abdominal pain, nausea, and vomiting. The patient reports she first began experiencing symptoms on 4/23, with nausea and vomiting. She then developed abdominal pain on 4/24. Her vomiting improved yesterday, but the abdominal pain has worsened and she is still nauseated, prompting her to contact EMS. The patient denies any diarrhea or fever. She was given Zofran en route via EMS with questionable improvement in symptoms. EMS notes the patient is wheelchair bound. The patient states she last had her catheter changed last week     Significant Medical hx, if applicable (i.e. CKD, diabetes) Neurogenic bladder-suprapubic catheter  DM 2   Allergies Allergies   Allergen Reactions     Povidone  "Iodine      \"mild skin irritation\" per patient report     Toradol [Ketorolac] Other (See Comments)     Pt gets nightmares      Weight, if applicable Wt Readings from Last 2 Encounters:   03/15/19 117.5 kg (259 lb)   03/11/19 117.7 kg (259 lb 8 oz)      Coumadin/Warfarin [Yes /No] No   Creatinine Level (mg/dl) Creatinine   Date Value Ref Range Status   04/26/2019 0.85 0.52 - 1.04 mg/dL Final      Creatinine clearance (ml/min), if applicable Serum creatinine: 0.85 mg/dL 04/26/19 1540  Estimated creatinine clearance: 91 mL/min   ED providers Impression and Plan (applicable information) 63-year-old female patient presents the ED due to abdominal pain.  Please see the HPI and exam for specifics.  The patient has an indwelling suprapubic catheter due to neurogenic bladder.  This was recently changed.  Given the patient's discomfort I did order a CT of her abdomen fortunately, the CT did not show any acute intra-abdominal source for her symptoms.  The CT did raise some question of an infiltrates in the chest though the patient has not had any productive cough, fever, shaking chills, and her x-ray did not reveal any other infiltrates.  The patient's urine is nitrite positive though it is possible her urinalysis findings are due to chronic indwelling catheter.  Given her abdominal discomfort I did elect to treat this with antibiotics though urine culture is pending.  The patient's pain improved in the ED.  She is on methadone at home and will need to follow with her pain clinic for further outpatient narcotic pain adjustments.  I will prescribe antibiotics for home and encouraged her to follow in the outpatient setting.  Anticipatory guidance given prior to discharge.     ED diagnosis 1. Abdominal pain, generalized R10.84   2. Complicated UTI (urinary tract infection) N39.0      ED provider Bradford Hadley,       RN Assessment (Patient s current Symptoms), include time called.  [Insert Left message here if message " left]  At 2:42, Left voicemail message requesting a call back to [phone] between 10 a.m. and 6:30 p.m., 7 days a week for patient's ED/UC lab results.  May leave a message 24/7, if no one available.     [facility] Emergency Department Provider Name & Recommendations (included time consulted)  At 2:50P Consulted with Dr Mylene Chatman, [facility] Emergency Dept Provider, and she recommended adding Macrobid 100 mg PO BID for 5 days.  Have her continue the Keflex and f/u with her PCP or urologist this week or next.      [RN Name]  Jesse Hernandes, THANG  [facility] RN  Lung Nodule and ED Lab Result RN  Epic pool (ED late result f/u RN): [id number]  [facility] INCIDENTAL RADIOLOGY F/U NURSES: [id number]  # [phone]    Copy of Lab result

## 2019-04-30 RX ORDER — NITROFURANTOIN 25; 75 MG/1; MG/1
100 CAPSULE ORAL 2 TIMES DAILY
Qty: 10 CAPSULE | Refills: 0 | Status: SHIPPED | OUTPATIENT
Start: 2019-04-30 | End: 2019-05-05

## 2019-04-30 NOTE — TELEPHONE ENCOUNTER
"[facility] Emergency Department Lab result notification     Patient/parent Name  Bhavani    RN Assessment (Patient s current Symptoms), include time called.  [Insert Left message here if message left]  At 11A, \"I am feeling a bit better but I think I'm dealing with some type of virus as well.\"    Lab result (if applicable):  Final Urine Culture Report on 4/29/19.  Emergency Dept/Urgent Care discharge antibiotic prescribed: Cephalexin (Keflex) 500 mg capsule, 1 capsule (500 mg) by mouth 4 times daily for 10 days.    Bacteria #1: >100,000 colonies/mL Escherichia coli is [SUSCEPTIBLE] to antibiotic    Bacteria #2: 50,000 to 100,000 colonies/mL Enterococcus faecalis is [NOT TESTED] to antibiotic    Bacteria #3: 10,000 to 50,000 colonies/mL Methicillin resistant Staphylococcus aureus (MRSA) is [RESISTANT] to antibiotic  Recommendations in treatment per  ED Lab result protocol.    RN Recommendations/Instructions per [facility] ED lab result protocol  Notified of urine culture result and treatment recommendations as per Mylene Chatman PA-C, [facility] Emergency Dept Provider.  (Mylene Chatman PA-C, was consulted with 4/29/19 at 2:50P).  She recommended adding Macrobid 100 mg PO BID for 5 days and have her continue the Keflex and f/u with her PCP or urologist this week or next.      Please Contact your PCP clinic or return to the Emergency department if your:    Symptoms do not resolve after completing antibiotic.    Symptoms worsen or other concerning symptom's.    PCP follow-up Questions asked: YES       [RN Name]  Jesse Hernandes RN  [facility] RN  Lung Nodule and ED Lab Result RN  Epic pool (ED late result f/u RN): [id number]  [facility] INCIDENTAL RADIOLOGY F/U NURSES: [id number]  Ph# [phone]    "

## 2019-04-30 NOTE — RESULT ENCOUNTER NOTE
Notified of urine culture result and treatment recommendations as per Mylene Chatman PA-C, [facility] Emergency Dept Provider.  Rx for Macrobid called into [facility] pharmacy

## 2019-05-21 ENCOUNTER — TELEPHONE (OUTPATIENT)
Dept: UROLOGY | Facility: CLINIC | Age: 64
End: 2019-05-21

## 2019-05-21 DIAGNOSIS — E78.5 HYPERLIPIDEMIA LDL GOAL <100: ICD-10-CM

## 2019-05-21 DIAGNOSIS — E11.21 TYPE 2 DIABETES MELLITUS WITH DIABETIC NEPHROPATHY, WITHOUT LONG-TERM CURRENT USE OF INSULIN (H): ICD-10-CM

## 2019-05-21 LAB
CREAT UR-MCNC: 109 MG/DL
HBA1C MFR BLD: 7.2 % (ref 0–5.6)
MICROALBUMIN UR-MCNC: 24 MG/L
MICROALBUMIN/CREAT UR: 22.11 MG/G CR (ref 0–25)

## 2019-05-21 PROCEDURE — 36415 COLL VENOUS BLD VENIPUNCTURE: CPT | Performed by: FAMILY MEDICINE

## 2019-05-21 PROCEDURE — 82043 UR ALBUMIN QUANTITATIVE: CPT | Performed by: FAMILY MEDICINE

## 2019-05-21 PROCEDURE — 80061 LIPID PANEL: CPT | Performed by: FAMILY MEDICINE

## 2019-05-21 PROCEDURE — 83036 HEMOGLOBIN GLYCOSYLATED A1C: CPT | Performed by: FAMILY MEDICINE

## 2019-05-21 PROCEDURE — 80053 COMPREHEN METABOLIC PANEL: CPT | Performed by: FAMILY MEDICINE

## 2019-05-21 NOTE — TELEPHONE ENCOUNTER
Left message for Yisel to discuss cath change orders.    Ira Suárez, RN, BSN  Urology Patient Care Supervisor

## 2019-05-21 NOTE — TELEPHONE ENCOUNTER
Left Yisel message to call back to discuss.    Jaki Dimas, RN, BSN  Care Coordinator- Reconstructive Urology

## 2019-05-21 NOTE — TELEPHONE ENCOUNTER
Message left Yisel from [facility] asking her to return a call pertaining to the catheter orders.    Papa Hollis MA

## 2019-05-21 NOTE — TELEPHONE ENCOUNTER
[facility] Call Center    Phone Message    May a detailed message be left on voicemail: yes    Reason for Call: Other: Jaki Andrew call , and would like a call back. Please call Yisel back to discuss.     Action Taken: Message routed to:  [facility] urology

## 2019-05-21 NOTE — TELEPHONE ENCOUNTER
[facility] Call [facility]    Phone Message    May a detailed message be left on voicemail: yes    Reason for Call: Order(s): Home Care Orders: Other: Yisel calling to request monthly catheter changes for pt. Please call her back with any questions.     Action Taken: Message routed to:  [facility] uro

## 2019-05-22 LAB
ALBUMIN SERPL-MCNC: 3.8 G/DL (ref 3.4–5)
ALP SERPL-CCNC: 90 U/L (ref 40–150)
ALT SERPL W P-5'-P-CCNC: 40 U/L (ref 0–50)
ANION GAP SERPL CALCULATED.3IONS-SCNC: 8 MMOL/L (ref 3–14)
AST SERPL W P-5'-P-CCNC: 31 U/L (ref 0–45)
BILIRUB SERPL-MCNC: 0.4 MG/DL (ref 0.2–1.3)
BUN SERPL-MCNC: 19 MG/DL (ref 7–30)
CALCIUM SERPL-MCNC: 10.5 MG/DL (ref 8.5–10.1)
CHLORIDE SERPL-SCNC: 99 MMOL/L (ref 94–109)
CHOLEST SERPL-MCNC: 201 MG/DL
CO2 SERPL-SCNC: 30 MMOL/L (ref 20–32)
CREAT SERPL-MCNC: 0.64 MG/DL (ref 0.52–1.04)
GFR SERPL CREATININE-BSD FRML MDRD: >90 ML/MIN/{1.73_M2}
GLUCOSE SERPL-MCNC: 145 MG/DL (ref 70–99)
HDLC SERPL-MCNC: 60 MG/DL
LDLC SERPL CALC-MCNC: 96 MG/DL
NONHDLC SERPL-MCNC: 141 MG/DL
POTASSIUM SERPL-SCNC: 4.5 MMOL/L (ref 3.4–5.3)
PROT SERPL-MCNC: 7.6 G/DL (ref 6.8–8.8)
SODIUM SERPL-SCNC: 137 MMOL/L (ref 133–144)
TRIGL SERPL-MCNC: 225 MG/DL

## 2019-05-22 NOTE — TELEPHONE ENCOUNTER
Yisel from [facility] was notified to change patient's SP tube catheter once a month.    Papa Hollis MA

## 2019-05-22 NOTE — TELEPHONE ENCOUNTER
[facility] Call Center    Phone Message    May a detailed message be left on voicemail: no    Reason for Call: Other: Yisel from [facility] called again about the Pt's cath orders. Please call her back.     Action Taken: Message routed to:  [facility] UROLOGY ADULT [facility]

## 2019-06-03 DIAGNOSIS — I10 HYPERTENSION GOAL BP (BLOOD PRESSURE) < 140/90: ICD-10-CM

## 2019-06-03 RX ORDER — LISINOPRIL 2.5 MG/1
TABLET ORAL
Qty: 90 TABLET | Refills: 0 | Status: SHIPPED | OUTPATIENT
Start: 2019-06-03 | End: 2019-08-26

## 2019-06-07 ENCOUNTER — TELEPHONE (OUTPATIENT)
Dept: UROLOGY | Facility: CLINIC | Age: 64
End: 2019-06-07

## 2019-06-07 DIAGNOSIS — N39.0 URINARY TRACT INFECTION: Primary | ICD-10-CM

## 2019-06-07 LAB
ALBUMIN UR-MCNC: NEGATIVE MG/DL
APPEARANCE UR: ABNORMAL
BACTERIA #/AREA URNS HPF: ABNORMAL /HPF
BILIRUB UR QL STRIP: NEGATIVE
COLOR UR AUTO: ABNORMAL
GLUCOSE UR STRIP-MCNC: NEGATIVE MG/DL
HGB UR QL STRIP: NEGATIVE
KETONES UR STRIP-MCNC: NEGATIVE MG/DL
LEUKOCYTE ESTERASE UR QL STRIP: ABNORMAL
MUCOUS THREADS #/AREA URNS LPF: PRESENT /LPF
NITRATE UR QL: POSITIVE
PH UR STRIP: 8.5 PH (ref 5–7)
RBC #/AREA URNS AUTO: 14 /HPF (ref 0–2)
SOURCE: ABNORMAL
SP GR UR STRIP: 1.01 (ref 1–1.03)
SQUAMOUS #/AREA URNS AUTO: 1 /HPF (ref 0–1)
TRI-PHOS CRY #/AREA URNS HPF: ABNORMAL /HPF
UROBILINOGEN UR STRIP-MCNC: NORMAL MG/DL (ref 0–2)
WBC #/AREA URNS AUTO: 12 /HPF (ref 0–5)

## 2019-06-07 PROCEDURE — 87186 SC STD MICRODIL/AGAR DIL: CPT | Performed by: UROLOGY

## 2019-06-07 PROCEDURE — 87086 URINE CULTURE/COLONY COUNT: CPT | Performed by: UROLOGY

## 2019-06-07 PROCEDURE — 81001 URINALYSIS AUTO W/SCOPE: CPT | Performed by: UROLOGY

## 2019-06-07 PROCEDURE — 87088 URINE BACTERIA CULTURE: CPT | Performed by: UROLOGY

## 2019-06-07 RX ORDER — CIPROFLOXACIN 500 MG/1
500 TABLET, FILM COATED ORAL 2 TIMES DAILY
Qty: 10 TABLET | Refills: 0 | Status: SHIPPED | OUTPATIENT
Start: 2019-06-07 | End: 2019-12-29

## 2019-06-07 NOTE — TELEPHONE ENCOUNTER
[facility] Call Center    Phone Message    May a detailed message be left on voicemail: yes    Reason for Call: Order(s): Home Care Orders: Skilled Nursing:  Yisel calling for orders for UA/UC. Pt believes she has a bladder infection. Urine smells bad, burning and bladder spasms. Please call Yisel back ASAP.    Action Taken: Message routed to:  [facility] Urology

## 2019-06-10 DIAGNOSIS — N39.0 URINARY TRACT INFECTION: Primary | ICD-10-CM

## 2019-06-10 LAB
BACTERIA SPEC CULT: ABNORMAL
Lab: ABNORMAL
SPECIMEN SOURCE: ABNORMAL

## 2019-06-10 RX ORDER — NITROFURANTOIN 25; 75 MG/1; MG/1
100 CAPSULE ORAL 2 TIMES DAILY
Qty: 10 CAPSULE | Refills: 0 | Status: SHIPPED | OUTPATIENT
Start: 2019-06-10 | End: 2019-06-15

## 2019-06-15 DIAGNOSIS — E03.4 HYPOTHYROIDISM DUE TO ACQUIRED ATROPHY OF THYROID: ICD-10-CM

## 2019-06-17 ENCOUNTER — MYC REFILL (OUTPATIENT)
Dept: FAMILY MEDICINE | Facility: CLINIC | Age: 64
End: 2019-06-17

## 2019-06-17 DIAGNOSIS — E03.4 HYPOTHYROIDISM DUE TO ACQUIRED ATROPHY OF THYROID: ICD-10-CM

## 2019-06-17 NOTE — TELEPHONE ENCOUNTER
"Requested Prescriptions   Pending Prescriptions Disp Refills     levothyroxine (SYNTHROID/LEVOTHROID) 88 MCG tablet [Pharmacy Med Name: LEVOTHYROXINE 0.088MG (88MCG) TAB] 90 tablet 0     Sig: TAKE 1 TABLET BY MOUTH EVERY DAY       Thyroid Protocol Failed - 6/15/2019  9:03 PM        Failed - Recent (12 mo) or future (30 days) visit within the authorizing provider's specialty     Patient had office visit in the last 12 months or has a visit in the next 30 days with authorizing provider or within the authorizing provider's specialty.  See \"Patient Info\" tab in inbasket, or \"Choose Columns\" in Meds & Orders section of the refill encounter.              Failed - Normal TSH on file in past 12 months     Recent Labs   Lab Test 08/15/18  1224   TSH 0.28*              Passed - Patient is 12 years or older        Passed - Medication is active on med list        Passed - No active pregnancy on record     If patient is pregnant or has had a positive pregnancy test, please check TSH.          Passed - No positive pregnancy test in past 12 months     If patient is pregnant or has had a positive pregnancy test, please check TSH.            "

## 2019-06-18 RX ORDER — LEVOTHYROXINE SODIUM 88 UG/1
88 TABLET ORAL DAILY
Qty: 90 TABLET | Refills: 0 | Status: SHIPPED | OUTPATIENT
Start: 2019-06-18 | End: 2019-09-14

## 2019-06-18 NOTE — TELEPHONE ENCOUNTER
Pending Prescriptions:                       Disp   Refills    levothyroxine (SYNTHROID/LEVOTHROID) 88 MC*90 tab*0        Sig: TAKE 1 TABLET BY MOUTH EVERY DAY    Routing refill request to provider for review/approval because:  Labs out of range:    TSH   Date Value Ref Range Status   08/15/2018 0.28 (L) 0.40 - 4.00 mU/L Final       Jason Kirkpatrick, RN, BSN

## 2019-06-20 RX ORDER — LEVOTHYROXINE SODIUM 88 UG/1
88 TABLET ORAL DAILY
Qty: 90 TABLET | Refills: 1 | Status: CANCELLED | OUTPATIENT
Start: 2019-06-20

## 2019-06-20 NOTE — TELEPHONE ENCOUNTER
"  Requested Prescriptions   Pending Prescriptions Disp Refills     levothyroxine (SYNTHROID/LEVOTHROID) 88 MCG tablet 90 tablet 1     Sig: Take 1 tablet (88 mcg) by mouth daily       Thyroid Protocol Failed - 6/17/2019 12:33 PM        Failed - Recent (12 mo) or future (30 days) visit within the authorizing provider's specialty     Patient had office visit in the last 12 months or has a visit in the next 30 days with authorizing provider or within the authorizing provider's specialty.  See \"Patient Info\" tab in inbasket, or \"Choose Columns\" in Meds & Orders section of the refill encounter.              Failed - Normal TSH on file in past 12 months     Recent Labs   Lab Test 08/15/18  1224   TSH 0.28*              Passed - Patient is 12 years or older        Passed - Medication is active on med list        Passed - No active pregnancy on record     If patient is pregnant or has had a positive pregnancy test, please check TSH.          Passed - No positive pregnancy test in past 12 months     If patient is pregnant or has had a positive pregnancy test, please check TSH.          "

## 2019-06-20 NOTE — TELEPHONE ENCOUNTER
Routing to TC.    Please assist patient in making an appointment and then route to Dr. Dueñas to consider refill.  (Failed RN protocol; please see below.)    Tan Sotelo RN

## 2019-06-24 ENCOUNTER — TELEPHONE (OUTPATIENT)
Dept: UROLOGY | Facility: CLINIC | Age: 64
End: 2019-06-24

## 2019-06-24 DIAGNOSIS — N39.0 URINARY TRACT INFECTION: Primary | ICD-10-CM

## 2019-06-24 LAB
ALBUMIN UR-MCNC: 20 MG/DL
APPEARANCE UR: ABNORMAL
BACTERIA #/AREA URNS HPF: ABNORMAL /HPF
BILIRUB UR QL STRIP: NEGATIVE
COLOR UR AUTO: YELLOW
GLUCOSE UR STRIP-MCNC: NEGATIVE MG/DL
HGB UR QL STRIP: ABNORMAL
KETONES UR STRIP-MCNC: NEGATIVE MG/DL
LEUKOCYTE ESTERASE UR QL STRIP: ABNORMAL
NITRATE UR QL: POSITIVE
PH UR STRIP: 7.5 PH (ref 5–7)
RBC #/AREA URNS AUTO: 23 /HPF (ref 0–2)
SOURCE: ABNORMAL
SP GR UR STRIP: 1.01 (ref 1–1.03)
SQUAMOUS #/AREA URNS AUTO: 1 /HPF (ref 0–1)
UROBILINOGEN UR STRIP-MCNC: NORMAL MG/DL (ref 0–2)
WBC #/AREA URNS AUTO: 16 /HPF (ref 0–5)

## 2019-06-24 PROCEDURE — 81001 URINALYSIS AUTO W/SCOPE: CPT | Performed by: UROLOGY

## 2019-06-24 PROCEDURE — 87086 URINE CULTURE/COLONY COUNT: CPT | Performed by: UROLOGY

## 2019-06-24 NOTE — TELEPHONE ENCOUNTER
[facility] Call Center    Phone Message    May a detailed message be left on voicemail: yes    Reason for Call: Order(s): Other:   Reason for requested: UA  Date needed: 6/25/2019  Provider name: Dr Gabriel Morillo is changing her Catheter today and pt is stating still has bladder spasms after the antibiotics and wanted her urine tested. Please call Yisel to discuss or advise.       Action Taken: Message routed to:  [facility]  Urology

## 2019-06-24 NOTE — TELEPHONE ENCOUNTER
Patient called and needs another uauc  Still having symptoms of uti Karey Lucio, LPN Staff Nurse

## 2019-06-26 LAB
BACTERIA SPEC CULT: ABNORMAL
Lab: ABNORMAL
SPECIMEN SOURCE: ABNORMAL

## 2019-06-27 ENCOUNTER — TELEPHONE (OUTPATIENT)
Dept: UROLOGY | Facility: CLINIC | Age: 64
End: 2019-06-27

## 2019-06-27 NOTE — TELEPHONE ENCOUNTER
----- Message from Yoandy Rios MD sent at 6/26/2019  4:59 PM CDT -----  Regarding: RE: UC Results  Just tell her she is colonized. No e/o infection. But she is due for botox. That would help. If she wants to get that done asap then I can write orders.   ----- Message -----  From: Jaki Dimas RN  Sent: 6/26/2019  12:58 PM  To: Yoandy Rios MD, Karey Lucio LPN  Subject: UC Results                                       Hi Dr. Rios,    Pts UC is positive for gram pos and gram neg rods, no susceptibility testing done.  Still having symptoms from previous + UC where we put her on cipro.  What would you like to treat her with?    Thanks,  Jaki

## 2019-07-01 DIAGNOSIS — J45.41 MODERATE PERSISTENT ASTHMA WITH ACUTE EXACERBATION: ICD-10-CM

## 2019-07-01 RX ORDER — ALBUTEROL SULFATE 90 UG/1
AEROSOL, METERED RESPIRATORY (INHALATION)
Qty: 18 G | Refills: 0 | Status: SHIPPED | OUTPATIENT
Start: 2019-07-01 | End: 2019-08-09

## 2019-07-01 NOTE — TELEPHONE ENCOUNTER
Routing refill request to provider for review/approval because:    Asthma Control Test 3/11/2019   ACT Total Score (Goal Greater than or Equal to 20) 16

## 2019-07-17 ENCOUNTER — TELEPHONE (OUTPATIENT)
Dept: UROLOGY | Facility: CLINIC | Age: 64
End: 2019-07-17

## 2019-07-17 NOTE — TELEPHONE ENCOUNTER
[facility] Call Center    Phone Message    May a detailed message be left on voicemail: yes    Reason for Call: Order(s): Home Care Orders: Skilled Nursing:  Continuation of skilled nursing. One time a month for three months and two as needed. For assessment of urinary tract, cath care, suprapubic cath change      Action Taken: Message routed to:  [facility] [place] urology

## 2019-07-18 ENCOUNTER — MYC MEDICAL ADVICE (OUTPATIENT)
Dept: FAMILY MEDICINE | Facility: CLINIC | Age: 64
End: 2019-07-18

## 2019-07-18 DIAGNOSIS — E11.21 TYPE 2 DIABETES MELLITUS WITH DIABETIC NEPHROPATHY (H): ICD-10-CM

## 2019-07-19 RX ORDER — METFORMIN HCL 500 MG
TABLET, EXTENDED RELEASE 24 HR ORAL
Qty: 180 TABLET | Refills: 0 | Status: SHIPPED | OUTPATIENT
Start: 2019-07-19 | End: 2019-10-07

## 2019-07-19 NOTE — TELEPHONE ENCOUNTER
One refill provided. Patient instructed to call and schedule an appointment.  Leonardo Jackson RN

## 2019-07-27 DIAGNOSIS — E11.21 TYPE 2 DIABETES MELLITUS WITH DIABETIC NEPHROPATHY (H): ICD-10-CM

## 2019-07-27 NOTE — TELEPHONE ENCOUNTER
Last Written Prescription Date:  7/19/19  Last Fill Quantity: 180 tablet,  # refills: 0   Last office visit: 12/13/2018 with prescribing provider:  Spenser   Future Office Visit:      Requested Prescriptions   Pending Prescriptions Disp Refills     metFORMIN (GLUCOPHAGE-XR) 500 MG 24 hr tablet [Pharmacy Med Name: METFORMIN ER 500MG 24HR TABS] 180 tablet 0     Sig: TAKE 1 TABLET BY MOUTH TWICE DAILY WITH MEALS       Biguanide Agents Passed - 7/27/2019  5:05 AM        Passed - Blood pressure less than 140/90 in past 6 months     BP Readings from Last 3 Encounters:   04/26/19 122/83   03/15/19 118/72   03/11/19 (!) 165/91                 Passed - Patient has documented LDL within the past 12 mos.     Recent Labs   Lab Test 05/21/19  1121   LDL 96             Passed - Patient has had a Microalbumin in the past 15 mos.     Recent Labs   Lab Test 05/21/19  1122   MICROL 24   UMALCR 22.11             Passed - Patient is age 10 or older        Passed - Patient has documented A1c within the specified period of time.     If HgbA1C is 8 or greater, it needs to be on file within the past 3 months.  If less than 8, must be on file within the past 6 months.     Recent Labs   Lab Test 05/21/19  1121   A1C 7.2*             Passed - Patient's CR is NOT>1.4 OR Patient's EGFR is NOT<45 within past 12 mos.     Recent Labs   Lab Test 05/21/19  1121   GFRESTIMATED >90   GFRESTBLACK >90       Recent Labs   Lab Test 05/21/19  1121   CR 0.64             Passed - Patient does NOT have a diagnosis of CHF.        Passed - Medication is active on med list        Passed - Patient is not pregnant        Passed - Patient has not had a positive pregnancy test within the past 12 mos.         Passed - Recent (6 mo) or future (30 days) visit within the authorizing provider's specialty     Patient had office visit in the last 6 months or has a visit in the next 30 days with authorizing provider or within the authorizing provider's specialty.  See  "\"Patient Info\" tab in inbasket, or \"Choose Columns\" in Meds & Orders section of the refill encounter.              "

## 2019-07-29 RX ORDER — METFORMIN HCL 500 MG
TABLET, EXTENDED RELEASE 24 HR ORAL
Qty: 180 TABLET | Refills: 0 | OUTPATIENT
Start: 2019-07-29

## 2019-07-29 NOTE — TELEPHONE ENCOUNTER
Metformin  Sent 7/19/19 with 3 month supply. Refill not appropriate at this time.     Sandra Roberts, RN, BSN

## 2019-08-09 ENCOUNTER — TELEPHONE (OUTPATIENT)
Dept: UROLOGY | Facility: CLINIC | Age: 64
End: 2019-08-09

## 2019-08-09 DIAGNOSIS — N39.0 URINARY TRACT INFECTION WITHOUT HEMATURIA, SITE UNSPECIFIED: Primary | ICD-10-CM

## 2019-08-09 DIAGNOSIS — J45.41 MODERATE PERSISTENT ASTHMA WITH ACUTE EXACERBATION: ICD-10-CM

## 2019-08-09 LAB
ALBUMIN UR-MCNC: 20 MG/DL
AMORPH CRY #/AREA URNS HPF: ABNORMAL /HPF
APPEARANCE UR: ABNORMAL
BACTERIA #/AREA URNS HPF: ABNORMAL /HPF
BILIRUB UR QL STRIP: NEGATIVE
COLOR UR AUTO: YELLOW
GLUCOSE UR STRIP-MCNC: NEGATIVE MG/DL
HGB UR QL STRIP: NEGATIVE
KETONES UR STRIP-MCNC: NEGATIVE MG/DL
LEUKOCYTE ESTERASE UR QL STRIP: ABNORMAL
MUCOUS THREADS #/AREA URNS LPF: PRESENT /LPF
NITRATE UR QL: POSITIVE
PH UR STRIP: 8.5 PH (ref 5–7)
RBC #/AREA URNS AUTO: 5 /HPF (ref 0–2)
SOURCE: ABNORMAL
SP GR UR STRIP: 1.02 (ref 1–1.03)
SQUAMOUS #/AREA URNS AUTO: 1 /HPF (ref 0–1)
TRI-PHOS CRY #/AREA URNS HPF: ABNORMAL /HPF
UROBILINOGEN UR STRIP-MCNC: NORMAL MG/DL (ref 0–2)
WBC #/AREA URNS AUTO: 51 /HPF (ref 0–5)

## 2019-08-09 PROCEDURE — 87086 URINE CULTURE/COLONY COUNT: CPT | Performed by: UROLOGY

## 2019-08-09 PROCEDURE — 81001 URINALYSIS AUTO W/SCOPE: CPT | Performed by: UROLOGY

## 2019-08-09 RX ORDER — ALBUTEROL SULFATE 90 UG/1
AEROSOL, METERED RESPIRATORY (INHALATION)
Qty: 18 G | Refills: 0 | Status: SHIPPED | OUTPATIENT
Start: 2019-08-09 | End: 2019-12-29

## 2019-08-09 NOTE — TELEPHONE ENCOUNTER
"Requested Prescriptions   Pending Prescriptions Disp Refills     VENTOLIN  (90 Base) MCG/ACT inhaler [Pharmacy Med Name: VENTOLIN HFA INH W/DOS CTR 200PUFFS] 18 g 0     Sig: INHALE 2 PUFFS INTO THE LUNGS EVERY 6 HOURS AS NEEDED FOR SHORTNESS OF BREATH       Asthma Maintenance Inhalers - Anticholinergics Failed - 8/9/2019  1:30 PM        Failed - Asthma control assessment score within normal limits in last 6 months     Please review ACT score.           Passed - Patient is age 12 years or older        Passed - Medication is active on med list        Passed - Recent (6 mo) or future (30 days) visit within the authorizing provider's specialty     Patient had office visit in the last 6 months or has a visit in the next 30 days with authorizing provider or within the authorizing provider's specialty.  See \"Patient Info\" tab in inbasket, or \"Choose Columns\" in Meds & Orders section of the refill encounter.              "

## 2019-08-09 NOTE — TELEPHONE ENCOUNTER
TC please call to schedule diabetes visit and  asthma med check. Failed last ACT.   Please confirm PCP Dr. Weiner?     Asthma Control Test 3/11/2019   ACT TOTAL SCORE    ACT Total Score (Goal Greater than or Equal to 20) 16      Prescription approved per [facility] Refill Protocol.  Sumit Laws RN

## 2019-08-09 NOTE — TELEPHONE ENCOUNTER
Ebony from [facility] states that the patient's symptoms are bladder spasms,sediment and mucous. Ebony was notified that typically we do not placed orders for those symptoms per Dr. Yoandy Rios/Jaki Dimas RNCC. Ebony was informed that the patient has not been seen since 2017. Ebony was notified that I will call the the patient to discuss her symptoms and inform her that she needs to schedule a follow up visit with Urology. Ebony agreed with the plan.      Papa Hollis MA

## 2019-08-09 NOTE — TELEPHONE ENCOUNTER
Hi Dr. Rios,    Patient would like for you to review her UA and see if you are willing to prescribe her an antibiotic prior to her UC result. If so, the antibiotic can be sent to [facility] in [place] on [address] (per patient). Patient was also instructed to call over the weekend to check to see if her UC result .    Thanks, Papa Hollis MA

## 2019-08-09 NOTE — TELEPHONE ENCOUNTER
[facility] Call Center    Phone Message    May a detailed message be left on voicemail: yes    Reason for Call: Order(s): Home Care Orders: Other: Ebony looking to get UA/UC orders.  She states that Bhavani is showing signs of an infection.  Please call her to give the order if approved    Action Taken: Message routed to:  [facility] UC Urology

## 2019-08-09 NOTE — TELEPHONE ENCOUNTER
[facility] Call Center    Phone Message    May a detailed message be left on voicemail: yes    Reason for Call: Other: pt calling for lab results and to see if an medication was called in. Please call the pt back to discuss.     Action Taken: Message routed to:  [facility] urology

## 2019-08-09 NOTE — TELEPHONE ENCOUNTER
Message left on the patient's voicemail stating for her to give us a call to discuss her UTI symptoms. Message also notified the patient that she has not been seen since 2017 and she needs to schedule a follow up appointment with Urology.      Papa Hollis MA

## 2019-08-09 NOTE — TELEPHONE ENCOUNTER
Patient states that she have a fever of 100.2,vomiting, chills, bladder spasms,sediment/mucous and foul smelling urine. Ebony from [facility] was notified that I orders for cath UA/UC.       Papa Hollis MA

## 2019-08-09 NOTE — TELEPHONE ENCOUNTER
[facility] Call Center    Phone Message    May a detailed message be left on voicemail: yes    Reason for Call: Other: pt calling to follow up a nurses call about a UTI. Please call the pt back to discuss.     Action Taken: Message routed to:  [facility] urology

## 2019-08-11 LAB
BACTERIA SPEC CULT: ABNORMAL
Lab: ABNORMAL
SPECIMEN SOURCE: ABNORMAL

## 2019-08-12 NOTE — TELEPHONE ENCOUNTER
Patient was treated with Bactrim for 5 days over the weekend by the Urologist resident on call.      Papa Hollis MA

## 2019-08-13 NOTE — TELEPHONE ENCOUNTER
Called patient to inform review of chart shows she is due for a office visit, DM, and Asthma f/u. Patient did not want to schedule at this time. Ruth Behrens.

## 2019-08-26 DIAGNOSIS — I10 HYPERTENSION GOAL BP (BLOOD PRESSURE) < 140/90: ICD-10-CM

## 2019-08-26 NOTE — TELEPHONE ENCOUNTER
"Requested Prescriptions   Pending Prescriptions Disp Refills     lisinopril (PRINIVIL/ZESTRIL) 2.5 MG tablet [Pharmacy Med Name: LISINOPRIL 2.5MG TABLETS]  Last Written Prescription Date:  6/3/2019  Last Fill Quantity: 90 tablet,  # refills: 0   Last office visit: 3/11/2019 with prescribing provider:  Regine   Future Office Visit:     90 tablet 0     Sig: TAKE 1 TABLET(2.5 MG) BY MOUTH DAILY       ACE Inhibitors (Including Combos) Protocol Failed - 8/26/2019  5:05 AM        Failed - Recent (12 mo) or future (30 days) visit within the authorizing provider's specialty     Patient had office visit in the last 12 months or has a visit in the next 30 days with authorizing provider or within the authorizing provider's specialty.  See \"Patient Info\" tab in inbasket, or \"Choose Columns\" in Meds & Orders section of the refill encounter.              Passed - Blood pressure under 140/90 in past 12 months     BP Readings from Last 3 Encounters:   04/26/19 122/83   03/15/19 118/72   03/11/19 (!) 165/91                 Passed - Medication is active on med list        Passed - Patient is age 18 or older        Passed - No active pregnancy on record        Passed - Normal serum creatinine on file in past 12 months     Recent Labs   Lab Test 05/21/19  1121  06/28/17  1446   CR 0.64   < >  --    CREAT  --   --  0.9    < > = values in this interval not displayed.             Passed - Normal serum potassium on file in past 12 months     Recent Labs   Lab Test 05/21/19  1121   POTASSIUM 4.5             Passed - No positive pregnancy test within past 12 months          "

## 2019-08-27 RX ORDER — LISINOPRIL 2.5 MG/1
TABLET ORAL
Qty: 30 TABLET | Refills: 0 | Status: SHIPPED | OUTPATIENT
Start: 2019-08-27 | End: 2019-09-25

## 2019-08-27 NOTE — TELEPHONE ENCOUNTER
Lisinopril  Routing refill request to provider for review/approval because:  Patient needs to be seen because:  Overdue for OV    Sandra Roberts, RN, BSN

## 2019-08-27 NOTE — TELEPHONE ENCOUNTER
30 day supply sent.  Patient is due for office visit and needs to be seen before further refills can be sent.  She will likely need to establish care with a new provider at the [facility]

## 2019-08-28 ENCOUNTER — TELEPHONE (OUTPATIENT)
Dept: UROLOGY | Facility: CLINIC | Age: 64
End: 2019-08-28

## 2019-08-28 NOTE — TELEPHONE ENCOUNTER
Spoke to patient she states she is not sure if she will continue to see Shawnee Dueñas DO. She states she will call back once she figures out who she will see and call back to schedule an appointment.    Thank you,  Tonya PRIETO    NE Team [place]

## 2019-08-28 NOTE — TELEPHONE ENCOUNTER
[facility] Call Center    Phone Message    May a detailed message be left on voicemail: yes    Reason for Call: Other: Pt as a possible UTI, Pt is having bladder spasms with incontience, low grade fever, Yisel is wanting to know from Dr. Yoandy Rios if they want homecare to get a urine sample or not, please call Yisel back at [phone], thank you     Action Taken: Message routed to:  [facility] Urology

## 2019-08-29 ENCOUNTER — OFFICE VISIT (OUTPATIENT)
Dept: URGENT CARE | Facility: URGENT CARE | Age: 64
End: 2019-08-29
Payer: MEDICARE

## 2019-08-29 ENCOUNTER — TELEPHONE (OUTPATIENT)
Dept: FAMILY MEDICINE | Facility: CLINIC | Age: 64
End: 2019-08-29

## 2019-08-29 VITALS
TEMPERATURE: 99.6 F | SYSTOLIC BLOOD PRESSURE: 126 MMHG | HEART RATE: 89 BPM | OXYGEN SATURATION: 98 % | DIASTOLIC BLOOD PRESSURE: 78 MMHG | RESPIRATION RATE: 14 BRPM | BODY MASS INDEX: 40.57 KG/M2 | WEIGHT: 259 LBS

## 2019-08-29 DIAGNOSIS — R82.90 ABNORMAL URINE FINDINGS: ICD-10-CM

## 2019-08-29 DIAGNOSIS — R30.0 DYSURIA: Primary | ICD-10-CM

## 2019-08-29 DIAGNOSIS — N39.0 URINARY TRACT INFECTION: Primary | ICD-10-CM

## 2019-08-29 DIAGNOSIS — N10 PYELONEPHRITIS, ACUTE: ICD-10-CM

## 2019-08-29 LAB
ALBUMIN UR-MCNC: ABNORMAL MG/DL
AMORPH CRY #/AREA URNS HPF: ABNORMAL /HPF
APPEARANCE UR: ABNORMAL
BACTERIA #/AREA URNS HPF: ABNORMAL /HPF
BILIRUB UR QL STRIP: NEGATIVE
COLOR UR AUTO: ABNORMAL
GLUCOSE UR STRIP-MCNC: NEGATIVE MG/DL
HGB UR QL STRIP: NEGATIVE
KETONES UR STRIP-MCNC: ABNORMAL MG/DL
LEUKOCYTE ESTERASE UR QL STRIP: ABNORMAL
NITRATE UR QL: POSITIVE
PH UR STRIP: 8.5 PH (ref 5–7)
RBC #/AREA URNS AUTO: ABNORMAL /HPF
SOURCE: ABNORMAL
SP GR UR STRIP: 1.01 (ref 1–1.03)
TRI-PHOS CRY #/AREA URNS HPF: ABNORMAL /HPF
URATE CRY #/AREA URNS HPF: ABNORMAL /HPF
UROBILINOGEN UR STRIP-ACNC: 0.2 EU/DL (ref 0.2–1)
WBC #/AREA URNS AUTO: ABNORMAL /HPF

## 2019-08-29 PROCEDURE — 87088 URINE BACTERIA CULTURE: CPT | Performed by: FAMILY MEDICINE

## 2019-08-29 PROCEDURE — 96372 THER/PROPH/DIAG INJ SC/IM: CPT | Performed by: FAMILY MEDICINE

## 2019-08-29 PROCEDURE — 81001 URINALYSIS AUTO W/SCOPE: CPT | Performed by: FAMILY MEDICINE

## 2019-08-29 PROCEDURE — 87086 URINE CULTURE/COLONY COUNT: CPT | Performed by: FAMILY MEDICINE

## 2019-08-29 PROCEDURE — 87186 SC STD MICRODIL/AGAR DIL: CPT | Performed by: FAMILY MEDICINE

## 2019-08-29 PROCEDURE — 99214 OFFICE O/P EST MOD 30 MIN: CPT | Mod: 25 | Performed by: FAMILY MEDICINE

## 2019-08-29 RX ORDER — CEFTRIAXONE SODIUM 1 G
1 VIAL (EA) INJECTION ONCE
Status: COMPLETED | OUTPATIENT
Start: 2019-08-29 | End: 2019-08-29

## 2019-08-29 RX ORDER — CEFDINIR 300 MG/1
300 CAPSULE ORAL 2 TIMES DAILY
Qty: 20 CAPSULE | Refills: 0 | Status: SHIPPED | OUTPATIENT
Start: 2019-08-29 | End: 2019-12-29

## 2019-08-29 RX ADMIN — Medication 1 G: at 20:03

## 2019-08-29 NOTE — TELEPHONE ENCOUNTER
Summary:    Patient is due/failing the following:   Eye exam and diabetes OV    Action needed:   Patient needs office visit for diabetes and eye exam.    Type of outreach:    routed to panel pool    Questions for provider review:    None                                                                                                                                    DANE Ronquillo       Chart routed to Care Team .

## 2019-08-30 ENCOUNTER — TRANSFERRED RECORDS (OUTPATIENT)
Dept: MULTI SPECIALTY CLINIC | Facility: CLINIC | Age: 64
End: 2019-08-30

## 2019-08-30 NOTE — PROGRESS NOTES
Clinic Administered Medication Documentation    MEDICATION LIST:   Injectable Medication Documentation    Patient was given Ceftriaxone Sodium (Rocephin). Prior to medication administration, verified patients identity using patient s name and date of birth. Please see MAR and medication order for additional information. Patient instructed to remain in clinic for 15 minutes.      Was entire vial of medication used? Yes  Vial/Syringe: Single dose vial  Expiration Date:  08/2020  Was this medication supplied by the patient? No   Sima Casas CMA

## 2019-08-30 NOTE — PROGRESS NOTES
SUBJECTIVE:  Chief Complaint   Patient presents with     Urgent Care     UTI        Bhavani White is a 63 year old female who  presents today for a possible UTI with associated  bilateral side back pain.   Symptoms of dysuria, frequency, burning and bladder spasms have been going on for 3day(s).  Hematuria no.  gradual onset, still present and worsening and moderate.   She is wheelchair bound, has a suprapubic catheter that drains her bladder.  She has had recurrent UTI's and chronic bacterial overgrowth of her urine  She has a urologist who manages her care of her urologic problems    She was symptomatic on 2019,  Was treated empirically with bactrim x 5 days and   The urine grew out 4 different bacteria, (but the bacteria were not tested for sensitivities).       Has  no symptoms of fever and chills.   some nausea  And 1 x  vomiting.  No history of vaginal   Discharge. Noted increased smell to the urine and increased sediment.  Also has increased painful bladder spasms. (she gets botox injections to control bladder spasms, and is due for repeat injections in a month.        Has   had previous pyelonephritis and been hospitalized due to severe UTI   Patient denies rigors and temperature > 101 degrees F. or vaginal discharge, vaginal odor and vaginal itching     Past Medical History:   Diagnosis Date     Anxiety      Asthma      Depression      DM (diabetes mellitus) (H)      Frequent UTI      History of blood transfusion      History of ulcer disease 2014    She notes from NSAIDs; nearly . Discussed a hospitalization at [place] for this.     Hypertension      Hypothyroid      Iatrogenic Cushing's disease (H)     off prednisone now     Morbid obesity (H)      MRSA (methicillin resistant staph aureus) culture positive 2012    repeat cx 10/12/2012 no staph mentioned.     Osteoarthritis     multiple joings.     Other chronic pain      Sleep apnea     No longer uses cpap.     Patient Active  Problem List   Diagnosis     Chronic low back pain     Moderate major depression (H)     Anxiety     Type 2 diabetes mellitus with diabetic nephropathy (H)     Hypothyroidism due to acquired atrophy of thyroid     Suprapubic catheter (H)     Lumbar spinal stenosis     Fibromyalgia     Osteoarthritis     Hypertension goal BP (blood pressure) < 140/90     Health Care Home     Neuropathy     Hyperlipidemia LDL goal <100     ACP (advance care planning)     Recurrent UTI (urinary tract infection)     Controlled substance agreement signed 10/24/2014     Chronic narcotic dependence (H)     History of ulcer disease     Wheelchair bound     Asthma     Personal history of methicillin resistant Staphylococcus aureus     Fracture of lower extremity     Neurogenic bladder     Ventral hernia     Osteoarthritis of cervical spine with myelopathy     DISH (diffuse idiopathic skeletal hyperostosis)     Morbid obesity, unspecified obesity type (H)     Methadone use (H)       ALLERGIES:  Povidone iodine and Toradol [ketorolac]      Current Outpatient Medications on File Prior to Visit:  albuterol (PROVENTIL) (2.5 MG/3ML) 0.083% neb solution Take 1 vial (2.5 mg) by nebulization every 4 hours as needed for shortness of breath / dyspnea or wheezing   aspirin 81 MG tablet Take by mouth daily   blood glucose (NO BRAND SPECIFIED) lancets standard Use to test blood sugar 1 times daily or as directed.   blood glucose monitoring (FREESTYLE FREEDOM LITE) meter device kit Use to test blood sugar.   blood glucose monitoring (NO BRAND SPECIFIED) meter device kit Dispense freestyle zak glucose meter   blood glucose monitoring (NO BRAND SPECIFIED) test strip Use to test blood sugars 1 times daily or as directed   [] cephALEXin (KEFLEX) 500 MG capsule Take 1 capsule (500 mg) by mouth 4 times daily for 10 days   ciprofloxacin (CIPRO) 500 MG tablet Take 1 tablet (500 mg) by mouth 2 times daily   DULoxetine HCl (CYMBALTA PO) Take 120 mg by mouth  daily    gabapentin (NEURONTIN) 600 MG tablet 1,200 mg 3 times daily    levalbuterol (XOPENEX) 1.25 MG/3ML neb solution TAKE ONE VIAL BY NEBULIZATION EVERY 8 HOURS AS NEEDED FOR SHORTNESS OF BREATH/DYSPNEA OR WHEEZING   levothyroxine (SYNTHROID/LEVOTHROID) 88 MCG tablet Take 1 tablet (88 mcg) by mouth daily DUE FOR THYROID LABS IN AUGUST   lisinopril (PRINIVIL/ZESTRIL) 2.5 MG tablet TAKE 1 TABLET(2.5 MG) BY MOUTH DAILY   metFORMIN (GLUCOPHAGE-XR) 500 MG 24 hr tablet TAKE 1 TABLET BY MOUTH TWICE DAILY WITH MEALS   methadone (DOLOPHINE) 10 MG tablet Take 2 pills TID   MIRALAX OR 1 capful daily prn   MULTIVITAMIN OR 1 a day   [] nitroFURantoin macrocrystal-monohydrate (MACROBID) 100 MG capsule Take 1 capsule (100 mg) by mouth 2 times daily for 5 days   [] nitroFURantoin macrocrystal-monohydrate (MACROBID) 100 MG capsule Take 1 capsule (100 mg) by mouth 2 times daily for 5 days   nitroFURantoin macrocrystal-monohydrate (MACROBID) 100 MG capsule Take 1 capsule (100 mg) by mouth 2 times daily   nystatin (MYCOSTATIN) 949505 UNIT/GM POWD Apply topically 3 times daily as needed   ondansetron (ZOFRAN ODT) 4 MG ODT tab Take 1-2 tablets (4-8 mg) by mouth every 8 hours as needed for nausea   order for DME Equipment being ordered: Nebulizer   order for DME Equipment being ordered: BIPAP. 15/5, Rate of 12, 2L O2 to keep sats 90-95%.   order for DME Equipment being ordered: Nebulizer   order for DME Equipment being ordered: Hospital Bed, total electric (head, foot, and height adjustments), with any type side rails, with mattress   order for DME Equipment being ordered: Motorized Wheelchair   order for DME Equipment being ordered: Trapeze bar for hospital bed   oxybutynin ER (DITROPAN XL) 15 MG 24 hr tablet Take 1 tablet (15 mg) by mouth daily   Phenazopyridine HCl (PYRIDIUM PO) Take 2 tablets by mouth 3 times daily as needed Dose unknown   senna-docusate (SENOKOT-S;PERICOLACE) 8.6-50 MG per tablet Take 2 tablets by  mouth 2 times daily   senna-docusate (SENOKOT-S;PERICOLACE) 8.6-50 MG per tablet Take 2 tablets by mouth 2 times daily To prevent constipation while taking narcotic pain medication. Start with 1 tablet twice daily. If no bowel movement in 24 hours, increase to 2 tablets twice daily.  Discontinue if you have loose stools or when you are no longer taking narcotics.   simvastatin (ZOCOR) 20 MG tablet TAKE 1 TABLET(20 MG) BY MOUTH AT BEDTIME   [] sulfamethoxazole-trimethoprim (BACTRIM DS/SEPTRA DS) 800-160 MG tablet Take 1 tablet by mouth 2 times daily for 5 days   tiZANidine (ZANAFLEX) 4 MG tablet Take 1 tablet (4 mg) by mouth 3 times daily   VENTOLIN  (90 Base) MCG/ACT inhaler INHALE 2 PUFFS INTO THE LUNGS EVERY 6 HOURS AS NEEDED FOR SHORTNESS OF BREATH     No current facility-administered medications on file prior to visit.     Social History     Tobacco Use     Smoking status: Never Smoker     Smokeless tobacco: Never Used   Substance Use Topics     Alcohol use: No       Family History   Problem Relation Age of Onset     Depression Son      Hypertension Mother      Heart Disease Mother         bypass     Depression Mother      Hypertension Father      Connective Tissue Disorder Father         ankylosing spondylitis     Cancer Father         colon; prostate     Other Cancer Father         bladder cancer     Depression Sister        ROS:   CONSTITUTIONAL:NEGATIVE for fever, chills,    INTEGUMENTARY/SKIN: NEGATIVE for worrisome rashes or lesions  EYES: NEGATIVE for vision changes or irritation  ENT/MOUTH: NEGATIVE for ear, mouth and throat problems  RESP:NEGATIVE for significant cough or SOB  GI: NEGATIVE for abdominal pain,  or change in bowel habits    OBJECTIVE:  /78   Pulse 89   Temp 99.6  F (37.6  C) (Oral)   Resp 14   Wt 117.5 kg (259 lb)   SpO2 98%   BMI 40.57 kg/m    GENERAL APPEARANCE: alert, moderate distress and cooperative  RESP: lungs clear to auscultation - no rales, rhonchi or  wheezes  CV: regular rates and rhythm, normal S1 S2, no murmur noted  ABDOMEN:  soft,   no HSM or masses and bowel sounds normal- suprapubic tenderness  BACK: bilateral  CVA tenderness  SKIN: no suspicious lesions or rashes    Results for orders placed or performed in visit on 08/29/19   *UA reflex to Microscopic and Culture ([facility] (except [place] and [place])   Result Value Ref Range    Color Urine Orange     Appearance Urine Cloudy     Glucose Urine Negative NEG^Negative mg/dL    Bilirubin Urine Negative NEG^Negative    Ketones Urine Trace (A) NEG^Negative mg/dL    Specific Gravity Urine 1.015 1.003 - 1.035    Blood Urine Negative NEG^Negative    pH Urine 8.5 (H) 5.0 - 7.0 pH    Protein Albumin Urine Trace (A) NEG^Negative mg/dL    Urobilinogen Urine 0.2 0.2 - 1.0 EU/dL    Nitrite Urine Positive (A) NEG^Negative    Leukocyte Esterase Urine Moderate (A) NEG^Negative    Source Midstream Urine    Urine Microscopic   Result Value Ref Range    WBC Urine 10-25 (A) OTO5^0 - 5 /HPF    RBC Urine O - 2 OTO2^O - 2 /HPF    Bacteria Urine Moderate (A) NEG^Negative /HPF    Amorphous Crystals Moderate (A) NEG^Negative /HPF    Uric Acid Crystals Many (A) NEG^Negative /HPF    Triple Phosphates Moderate (A) NEG^Negative /HPF     *Note: Due to a large number of results and/or encounters for the requested time period, some results have not been displayed. A complete set of results can be found in Results Review.         ASSESSMENT:   Dysuria     - *UA reflex to Microscopic and Culture ([facility] (except [place] and [place])  - Urine Microscopic    Abnormal urine findings     - Urine Culture Aerobic Bacterial    Pyelonephritis, acute     Given injection of ceftriaxone 1 gm IM in clinic    - cefdinir (OMNICEF) 300 MG capsule; Take 1 capsule (300 mg) by mouth 2 times daily for 10 days     Drink plenty of fluids.  Prevention and treatment of UTI's discussed.Signs and symptoms of  pyelonephritis mentioned.  Follow up  At UC or ER physician if worsening, especially increased fever/ chills/ back pain/ weakness    We discussed that kidney infections can be quite severe illnesses and so more aggressive and prolonged treatment of antibiotics is necessary.  In very severe cases a patient may require hospitalization    We discussed that a urine culture is being performed and that if we find that if there is a bacteria in the urine that is resistant to the prescribed antibiotic he/she will receive call to change the antibiotic to better cover the infection.

## 2019-08-30 NOTE — PATIENT INSTRUCTIONS
"  Patient Education     Kidney Infection (Adult Female)    An infection in one or both kidneys is called \"pyelonephritis.\" It usually happens when bacteria (or rarely, viruses, fungi, or other disease-causing organisms) get into the kidney. The bacteria (or other disease-causing organisms) can enter the kidneys from the bladder or blood traveling from other parts of the body. A kidney infection can become serious. It can cause severe illness, scarring of the kidneys, or kidney failure if not treated properly.  Common causes for this problem include:    Not keeping the genital area clean and dry, which promotes the growth of bacteria    Wiping back to front which drags bacteria from the rectum toward the urinary opening (urethra)    Wearing tight pants or underwear (this lets moisture build up in the genital area, which helps bacteria grow)    Holding urine in for long periods of time    Dehydration  Kidney infections can cause symptoms similar to a bladder infection. Symptoms include:    Pain (or burning) when urinating    Having to urinate more often than usual    Blood in the urine (pink or red)    Abdominal pain or discomfort, usually in the lower abdomen    Pain in the side or back    Pain above the pubic bone    Fever or chills    Vomiting    Loss of appetite  Treatment is oral antibiotics, or in more severe cases, intramuscular or IV antibiotics. These are started right away and may be changed once urine culture results determine the infecting organisms. Treatment helps prevent a more serious kidney infection.  Medicines  Medicines can help in the treatment of a bladder infection:    Take antibiotics until they are used up, even if you feel better. It is important to finish them to make sure the infection is gone.    Unless another medicine was prescribed, you can use over-the-counter medicines for pain, fever, or discomfort. If you have chronic liver of kidney disease, talk with your healthcare provider " before using these medicines. Also talk with your provider if you've ever had a stomach ulcer or gastrointestinal (GI) bleeding, or are taking blood thinners.  Home care  The following are general care guidelines:    Stay home from work or school. Rest in bed until your fever breaks and you are feeling better, or as advised by your healthcare provider.    Drink lots of fluid unless you must restrict fluids for other medical reasons. This will force the medicine into your urinary system and flush the bacteria out of your body. Ask your healthcare provider how much you should drink.    Don't have sex until you have finished all of your medicine and your symptoms are gone.    Don't have caffeine, alcohol, or spicy foods. These foods may irritate the kidneys and bladder.    Don't take bubble baths. Sensitivity to the chemicals in bubble baths can irritate the urethra.    Make sure you wipe from front to back after using the toilet.    Wear loose cloths and cotton underwear.  Prevention  These self-care steps can help prevent future infections:    Drink plenty of fluids to prevent dehydration and flush out the bladder. Do this unless you must restrict fluids for other health reasons, or your healthcare provider told you not to.    Proper cleaning after going to the bathroom in important. Make sure you wipe from front to back after using the toilet.    Urinate more often. Don't try to hold urine in for a long time.    Don't wear tight-fitting pants and underwear.    Improve your diet to prevent constipation. Eat more fruits, vegetables, and fiber. Eat less junk and fatty foods. Constipation can make a urinary tract infection more likely. Talk with your healthcare provider if you have trouble with bowel movements.    Urinate right after intercourse to flush out the bladder.  Follow-up care  Follow up with your healthcare provider, or as advised. Additional testing may be needed to make sure the infection has cleared. Close  follow-up and further testing is very important to find the cause and to prevent future infections.  If a urine culture was done, you will be contacted if your treatment needs to be changed. If directed, you may call to find out the results.  If you had an X-ray, CT scan, or other diagnostic test, you will be notified of any new findings that may affect your care.  Call 911  Call 911 if any of the following occur:    Trouble breathing    Fainting or loss of consciousness    Rapid or very slow heart rate    Weakness, dizziness, or fainting    Difficulty arousing or confusion  When to seek medical advice  Call your healthcare provider right away if any of these occur:    Fever 100.4 F (38 C) or higher, or as directed by your healthcare provider    Not feeling better within 1 to 2 days after starting antibiotics    Any symptom that continues after 3 days of treatment    Increasing pain in the stomach, back, side, or groin area    Repeated vomiting    Not able to take prescribed medicine due to nausea or another reason    Bloody, dark-colored, or foul smelling urine    Trouble urinating or decreased urine output    No urine for 8 hours, no tears when crying, sunken eyes, or dry mouth  Date Last Reviewed: 10/1/2016    8160-7912 [employer]. [address] [place] [address], [place] [address]. All rights reserved. This information is not intended as a substitute for professional medical care. Always follow your healthcare professional's instructions.            Clear

## 2019-09-01 DIAGNOSIS — N10 PYELONEPHRITIS, ACUTE: Primary | ICD-10-CM

## 2019-09-01 RX ORDER — SULFAMETHOXAZOLE/TRIMETHOPRIM 800-160 MG
1 TABLET ORAL 2 TIMES DAILY
Qty: 14 TABLET | Refills: 0
Start: 2019-09-01 | End: 2019-12-29

## 2019-09-02 LAB
BACTERIA SPEC CULT: ABNORMAL
SPECIMEN SOURCE: ABNORMAL

## 2019-09-06 DIAGNOSIS — N31.9 NEUROGENIC BLADDER: Primary | ICD-10-CM

## 2019-09-06 RX ORDER — CEFAZOLIN SODIUM 1 G/50ML
1 INJECTION, SOLUTION INTRAVENOUS SEE ADMIN INSTRUCTIONS
Status: CANCELLED | OUTPATIENT
Start: 2019-09-06

## 2019-09-06 RX ORDER — CEFAZOLIN SODIUM 2 G/50ML
2 SOLUTION INTRAVENOUS
Status: CANCELLED | OUTPATIENT
Start: 2019-09-06

## 2019-09-10 ENCOUNTER — TELEPHONE (OUTPATIENT)
Dept: UROLOGY | Facility: CLINIC | Age: 64
End: 2019-09-10

## 2019-09-10 NOTE — TELEPHONE ENCOUNTER
Patient is scheduled for surgery with Dr. Rios      Spoke or left message with: I spoke to the patient    Date of Surgery: 11/01/19    Location [facility] OR    H&P: Scheduled with PCP    Post op: NA    Additional imaging/appointments: NA    Surgery packet sent: 09/10/19     Additional comments:  The patient is aware they need a pre-op at least a couple of weeks before surgery date. We went over the patient needing a  and someone to stay with them for 24 hours after the surgery. The patient was given my direct number for any questions [phone]

## 2019-09-14 DIAGNOSIS — E03.4 HYPOTHYROIDISM DUE TO ACQUIRED ATROPHY OF THYROID: ICD-10-CM

## 2019-09-14 NOTE — LETTER
[address] [id number] [phone]                                                                                                September 20, 2019      Bhavani White  [address] [facility] [address]  [facility] [address]        Pavan White,      We have attempted to reach you regarding your refill request, but have been unsuccessful.    We have received a refill request for one of your medications. We have sent a refill of your medication to your pharmacy, however your provider has noted that you will need to be seen for a follow up visit in order to continue this medication.    Please contact us at [phone] to schedule an appointment with your provider before your next refill is due.      Thank you,      Your Health Care Team at the [facility]

## 2019-09-16 RX ORDER — LEVOTHYROXINE SODIUM 88 UG/1
TABLET ORAL
Qty: 90 TABLET | Refills: 0 | Status: SHIPPED | OUTPATIENT
Start: 2019-09-16 | End: 2020-07-22

## 2019-09-16 NOTE — TELEPHONE ENCOUNTER
"Requested Prescriptions   Pending Prescriptions Disp Refills     levothyroxine (SYNTHROID/LEVOTHROID) 88 MCG tablet [Pharmacy Med Name: LEVOTHYROXINE 0.088MG (88MCG) TAB]  Last Written Prescription Date:  6/18/2019  Last Fill Quantity: 90 tablet,  # refills: 0   Last office visit: 3/11/2019 with prescribing provider:  Regine   Future Office Visit:     90 tablet 0     Sig: TAKE 1 TABLET(88 MCG) BY MOUTH DAILY       Thyroid Protocol Failed - 9/14/2019 10:49 PM        Failed - Recent (12 mo) or future (30 days) visit within the authorizing provider's specialty     Patient had office visit in the last 12 months or has a visit in the next 30 days with authorizing provider or within the authorizing provider's specialty.  See \"Patient Info\" tab in inbasket, or \"Choose Columns\" in Meds & Orders section of the refill encounter.              Failed - Normal TSH on file in past 12 months     Recent Labs   Lab Test 08/15/18  1224   TSH 0.28*              Passed - Patient is 12 years or older        Passed - Medication is active on med list        Passed - No active pregnancy on record     If patient is pregnant or has had a positive pregnancy test, please check TSH.          Passed - No positive pregnancy test in past 12 months     If patient is pregnant or has had a positive pregnancy test, please check TSH.            "

## 2019-09-16 NOTE — TELEPHONE ENCOUNTER
90 day supply sent.  Patient needs to establish with a new PCP at the [facility] for further refills.  She is due for lab check.

## 2019-09-16 NOTE — TELEPHONE ENCOUNTER
LMOM for patient to call back main clinic number to schedule an appointment with new provider in [place].    Thank you,  Tonya PRIETO    NE Team [place]

## 2019-09-18 NOTE — TELEPHONE ENCOUNTER
2nd attempt.  LMOM for patient to call back main clinic number to schedule an appointment with [place] Provider.    Thank you,  Tonya PRIETO    NE Team [place]

## 2019-09-20 ENCOUNTER — TELEPHONE (OUTPATIENT)
Dept: UROLOGY | Facility: CLINIC | Age: 64
End: 2019-09-20

## 2019-09-20 NOTE — TELEPHONE ENCOUNTER
[facility] Call Center    Phone Message    May a detailed message be left on voicemail: yes    Reason for Call: Order(s): Home Care Orders: Other: [facility] needs new orders for the catheter change every 4/6 weeks please call back for any clarification needed     Action Taken: Message routed to:  [facility] uro .

## 2019-09-21 DIAGNOSIS — J45.41 MODERATE PERSISTENT ASTHMA WITH ACUTE EXACERBATION: ICD-10-CM

## 2019-09-22 NOTE — TELEPHONE ENCOUNTER
"Requested Prescriptions   Pending Prescriptions Disp Refills     VENTOLIN  (90 Base) MCG/ACT inhaler [Pharmacy Med Name: VENTOLIN HFA INH W/DOS CTR 200PUFFS] 18 g 0     Sig: INHALE 2 PUFFS INTO THE LUNGS EVERY 6 HOURS AS NEEDED FOR SHORTNESS OF BREATH       Asthma Maintenance Inhalers - Anticholinergics Failed - 9/21/2019  6:55 PM        Failed - Asthma control assessment score within normal limits in last 6 months     Please review ACT score.           Passed - Patient is age 12 years or older        Passed - Medication is active on med list        Passed - Recent (6 mo) or future (30 days) visit within the authorizing provider's specialty     Patient had office visit in the last 6 months or has a visit in the next 30 days with authorizing provider or within the authorizing provider's specialty.  See \"Patient Info\" tab in inbasket, or \"Choose Columns\" in Meds & Orders section of the refill encounter.              Last Written Prescription Date:  08/09/2019  Last Fill Quantity: 18g,  # refills: 0   Last office visit: 3/11/2019 with prescribing provider:  Dr Weiner   Future Office Visit:      "

## 2019-09-23 NOTE — TELEPHONE ENCOUNTER
Called home care and gave verbal orders to change catheter every 4 weeks Karey Lucio LPN Staff Nurse

## 2019-09-23 NOTE — TELEPHONE ENCOUNTER
Return in about 3 months (around 6/11/2019).     Routing refill request to provider for review/approval because:  Patient needs to be seen because:  Last visit > 6 months ago  ACT < 20    ACT Total Scores 2/15/2018 8/15/2018 3/11/2019   ACT TOTAL SCORE - - -   ASTHMA ER VISITS - - -   ASTHMA HOSPITALIZATIONS - - -   ACT TOTAL SCORE (Goal Greater than or Equal to 20) 15 20 16   In the past 12 months, how many times did you visit the emergency room for your asthma without being admitted to the hospital? 0 0 0   In the past 12 months, how many times were you hospitalized overnight because of your asthma? 0 0 0

## 2019-09-24 RX ORDER — ALBUTEROL SULFATE 90 UG/1
AEROSOL, METERED RESPIRATORY (INHALATION)
Qty: 18 G | Refills: 0 | Status: SHIPPED | OUTPATIENT
Start: 2019-09-24 | End: 2019-10-30

## 2019-09-25 DIAGNOSIS — I10 HYPERTENSION GOAL BP (BLOOD PRESSURE) < 140/90: ICD-10-CM

## 2019-09-25 RX ORDER — LISINOPRIL 2.5 MG/1
TABLET ORAL
Qty: 30 TABLET | Refills: 0 | Status: SHIPPED | OUTPATIENT
Start: 2019-09-25 | End: 2019-10-28 | Stop reason: ALTCHOICE

## 2019-09-26 ENCOUNTER — MEDICAL CORRESPONDENCE (OUTPATIENT)
Dept: HEALTH INFORMATION MANAGEMENT | Facility: CLINIC | Age: 64
End: 2019-09-26

## 2019-09-27 ENCOUNTER — TELEPHONE (OUTPATIENT)
Dept: FAMILY MEDICINE | Facility: CLINIC | Age: 64
End: 2019-09-27

## 2019-09-27 NOTE — TELEPHONE ENCOUNTER
Reason for Call:  Form, our goal is to have forms completed with 72 hours, however, some forms may require a visit or additional information.    Type of letter, form or note:  Supplies    Who is the form from?: Supply company (if other please explain)    Where did the form come from: form was faxed in    What clinic location was the form placed at?: [facility]     Where the form was placed: Dr Weiner Box/Folder    What number is listed as a contact on the form?: [phone]       Additional comments: Please review and sign/date and fax to [phone]    Call taken on 9/27/2019 at 7:17 AM by Celi Sauceda

## 2019-09-28 ENCOUNTER — HEALTH MAINTENANCE LETTER (OUTPATIENT)
Age: 64
End: 2019-09-28

## 2019-10-07 ENCOUNTER — MYC MEDICAL ADVICE (OUTPATIENT)
Dept: FAMILY MEDICINE | Facility: CLINIC | Age: 64
End: 2019-10-07

## 2019-10-07 DIAGNOSIS — E11.21 TYPE 2 DIABETES MELLITUS WITH DIABETIC NEPHROPATHY (H): ICD-10-CM

## 2019-10-07 RX ORDER — METFORMIN HCL 500 MG
TABLET, EXTENDED RELEASE 24 HR ORAL
Qty: 60 TABLET | Refills: 0 | Status: SHIPPED | OUTPATIENT
Start: 2019-10-07 | End: 2019-10-28

## 2019-10-07 NOTE — TELEPHONE ENCOUNTER
Requested Prescriptions   Pending Prescriptions Disp Refills     metFORMIN (GLUCOPHAGE-XR) 500 MG 24 hr tablet [Pharmacy Med Name: METFORMIN ER 500MG 24HR TABS]  Medication may not be due for refill  Last Written Prescription Date:  10/7/2019  Last Fill Quantity: 60 tablet,  # refills: 0   Last office visit: 3/11/2019 with prescribing provider:  Regine Shahid Office Visit:   Next 5 appointments (look out 90 days)    Oct 18, 2019  2:05 PM CDT  Office Visit with Skylar Moore MD, Cr Rn Pal 3a, CR EXAM ROOM 29  [facility] [address] [place] [id number] [phone]          180 tablet 0     Sig: TAKE 1 TABLET BY MOUTH TWICE DAILY WITH MEALS       Biguanide Agents Passed - 10/7/2019  3:48 PM        Passed - Blood pressure less than 140/90 in past 6 months     BP Readings from Last 3 Encounters:   08/29/19 126/78   04/26/19 122/83   03/15/19 118/72                 Passed - Patient has documented LDL within the past 12 mos.     Recent Labs   Lab Test 05/21/19  1121   LDL 96             Passed - Patient has had a Microalbumin in the past 15 mos.     Recent Labs   Lab Test 05/21/19  1122   MICROL 24   UMALCR 22.11             Passed - Patient is age 10 or older        Passed - Patient has documented A1c within the specified period of time.     If HgbA1C is 8 or greater, it needs to be on file within the past 3 months.  If less than 8, must be on file within the past 6 months.     Recent Labs   Lab Test 05/21/19  1121   A1C 7.2*             Passed - Patient's CR is NOT>1.4 OR Patient's EGFR is NOT<45 within past 12 mos.     Recent Labs   Lab Test 05/21/19  1121   GFRESTIMATED >90   GFRESTBLACK >90       Recent Labs   Lab Test 05/21/19  1121   CR 0.64             Passed - Patient does NOT have a diagnosis of CHF.        Passed - Medication is active on med list        Passed - Patient is not pregnant        Passed - Patient has not had  "a positive pregnancy test within the past 12 mos.         Passed - Recent (6 mo) or future (30 days) visit within the authorizing provider's specialty     Patient had office visit in the last 6 months or has a visit in the next 30 days with authorizing provider or within the authorizing provider's specialty.  See \"Patient Info\" tab in inbasket, or \"Choose Columns\" in Meds & Orders section of the refill encounter.              "

## 2019-10-08 RX ORDER — METFORMIN HCL 500 MG
TABLET, EXTENDED RELEASE 24 HR ORAL
Qty: 60 TABLET | Refills: 0 | OUTPATIENT
Start: 2019-10-08

## 2019-10-08 NOTE — TELEPHONE ENCOUNTER
Writer notes Rx sent 10/07/2019 #60/0. Request is for 90-days supply.    Per 10/07/2019 Mychart encounter, unable to give 90-day supply. Patient is due for follow up and plans to re-establish care elsewhere    Medication refused with note to pharmacy    Thank You!  Adelaida aZrate, RN  Triage Nurse

## 2019-10-26 ASSESSMENT — ANXIETY QUESTIONNAIRES
7. FEELING AFRAID AS IF SOMETHING AWFUL MIGHT HAPPEN: NOT AT ALL
5. BEING SO RESTLESS THAT IT IS HARD TO SIT STILL: NOT AT ALL
4. TROUBLE RELAXING: SEVERAL DAYS
1. FEELING NERVOUS, ANXIOUS, OR ON EDGE: SEVERAL DAYS
6. BECOMING EASILY ANNOYED OR IRRITABLE: SEVERAL DAYS
GAD7 TOTAL SCORE: 5
2. NOT BEING ABLE TO STOP OR CONTROL WORRYING: SEVERAL DAYS
3. WORRYING TOO MUCH ABOUT DIFFERENT THINGS: SEVERAL DAYS

## 2019-10-26 ASSESSMENT — PATIENT HEALTH QUESTIONNAIRE - PHQ9: SUM OF ALL RESPONSES TO PHQ QUESTIONS 1-9: 5

## 2019-10-27 ASSESSMENT — ANXIETY QUESTIONNAIRES: GAD7 TOTAL SCORE: 5

## 2019-10-28 ENCOUNTER — OFFICE VISIT (OUTPATIENT)
Dept: FAMILY MEDICINE | Facility: CLINIC | Age: 64
End: 2019-10-28
Payer: MEDICARE

## 2019-10-28 VITALS
HEIGHT: 65 IN | WEIGHT: 268.2 LBS | OXYGEN SATURATION: 95 % | BODY MASS INDEX: 44.68 KG/M2 | TEMPERATURE: 98.4 F | DIASTOLIC BLOOD PRESSURE: 84 MMHG | SYSTOLIC BLOOD PRESSURE: 142 MMHG | HEART RATE: 106 BPM

## 2019-10-28 DIAGNOSIS — E03.4 HYPOTHYROIDISM DUE TO ACQUIRED ATROPHY OF THYROID: ICD-10-CM

## 2019-10-28 DIAGNOSIS — E11.21 TYPE 2 DIABETES MELLITUS WITH DIABETIC NEPHROPATHY, WITHOUT LONG-TERM CURRENT USE OF INSULIN (H): ICD-10-CM

## 2019-10-28 DIAGNOSIS — N39.0 RECURRENT UTI (URINARY TRACT INFECTION): ICD-10-CM

## 2019-10-28 DIAGNOSIS — I10 HYPERTENSION GOAL BP (BLOOD PRESSURE) < 140/90: ICD-10-CM

## 2019-10-28 DIAGNOSIS — R82.90 NONSPECIFIC FINDING ON EXAMINATION OF URINE: ICD-10-CM

## 2019-10-28 DIAGNOSIS — N31.9 NEUROGENIC BLADDER: ICD-10-CM

## 2019-10-28 DIAGNOSIS — Z93.59 SUPRAPUBIC CATHETER (H): ICD-10-CM

## 2019-10-28 DIAGNOSIS — Z01.818 PREOP GENERAL PHYSICAL EXAM: Primary | ICD-10-CM

## 2019-10-28 LAB
ALBUMIN UR-MCNC: 30 MG/DL
APPEARANCE UR: ABNORMAL
BACTERIA #/AREA URNS HPF: ABNORMAL /HPF
BILIRUB UR QL STRIP: NEGATIVE
COLOR UR AUTO: YELLOW
GLUCOSE UR STRIP-MCNC: NEGATIVE MG/DL
HBA1C MFR BLD: 7.3 % (ref 0–5.6)
HGB UR QL STRIP: NEGATIVE
KETONES UR STRIP-MCNC: NEGATIVE MG/DL
LEUKOCYTE ESTERASE UR QL STRIP: ABNORMAL
NITRATE UR QL: POSITIVE
PH UR STRIP: >9 PH (ref 5–7)
RBC #/AREA URNS AUTO: ABNORMAL /HPF
SOURCE: ABNORMAL
SP GR UR STRIP: 1.01 (ref 1–1.03)
TRI-PHOS CRY #/AREA URNS HPF: ABNORMAL /HPF
UROBILINOGEN UR STRIP-ACNC: 0.2 EU/DL (ref 0.2–1)
WBC #/AREA URNS AUTO: ABNORMAL /HPF

## 2019-10-28 PROCEDURE — 83036 HEMOGLOBIN GLYCOSYLATED A1C: CPT | Performed by: FAMILY MEDICINE

## 2019-10-28 PROCEDURE — 84443 ASSAY THYROID STIM HORMONE: CPT | Performed by: FAMILY MEDICINE

## 2019-10-28 PROCEDURE — 87088 URINE BACTERIA CULTURE: CPT | Performed by: FAMILY MEDICINE

## 2019-10-28 PROCEDURE — 99215 OFFICE O/P EST HI 40 MIN: CPT | Performed by: FAMILY MEDICINE

## 2019-10-28 PROCEDURE — 36415 COLL VENOUS BLD VENIPUNCTURE: CPT | Performed by: FAMILY MEDICINE

## 2019-10-28 PROCEDURE — 87186 SC STD MICRODIL/AGAR DIL: CPT | Performed by: FAMILY MEDICINE

## 2019-10-28 PROCEDURE — 81001 URINALYSIS AUTO W/SCOPE: CPT | Performed by: FAMILY MEDICINE

## 2019-10-28 PROCEDURE — 87086 URINE CULTURE/COLONY COUNT: CPT | Performed by: FAMILY MEDICINE

## 2019-10-28 RX ORDER — METFORMIN HCL 500 MG
TABLET, EXTENDED RELEASE 24 HR ORAL
Qty: 180 TABLET | Refills: 0 | Status: ON HOLD | OUTPATIENT
Start: 2019-10-28 | End: 2020-01-21

## 2019-10-28 RX ORDER — LISINOPRIL 5 MG/1
5 TABLET ORAL DAILY
Qty: 30 TABLET | Refills: 0 | Status: SHIPPED | OUTPATIENT
Start: 2019-10-28 | End: 2019-11-25

## 2019-10-28 RX ORDER — CIPROFLOXACIN 500 MG/1
500 TABLET, FILM COATED ORAL 2 TIMES DAILY
Qty: 14 TABLET | Refills: 0 | Status: SHIPPED | OUTPATIENT
Start: 2019-10-28 | End: 2019-12-29

## 2019-10-28 SDOH — HEALTH STABILITY: MENTAL HEALTH: HOW OFTEN DO YOU HAVE 6 OR MORE DRINKS ON ONE OCCASION?: PATIENT DECLINED

## 2019-10-28 SDOH — SOCIAL STABILITY: SOCIAL NETWORK: HOW OFTEN DO YOU ATTENT MEETINGS OF THE CLUB OR ORGANIZATION YOU BELONG TO?: PATIENT DECLINED

## 2019-10-28 SDOH — SOCIAL STABILITY: SOCIAL NETWORK: HOW OFTEN DO YOU GET TOGETHER WITH FRIENDS OR RELATIVES?: PATIENT DECLINED

## 2019-10-28 SDOH — SOCIAL STABILITY: SOCIAL NETWORK: HOW OFTEN DO YOU ATTEND CHURCH OR RELIGIOUS SERVICES?: PATIENT DECLINED

## 2019-10-28 SDOH — ECONOMIC STABILITY: TRANSPORTATION INSECURITY
IN THE PAST 12 MONTHS, HAS THE LACK OF TRANSPORTATION KEPT YOU FROM MEDICAL APPOINTMENTS OR FROM GETTING MEDICATIONS?: NO

## 2019-10-28 SDOH — SOCIAL STABILITY: SOCIAL NETWORK: ARE YOU MARRIED, WIDOWED, DIVORCED, SEPARATED, NEVER MARRIED, OR LIVING WITH A PARTNER?: MARRIED

## 2019-10-28 SDOH — HEALTH STABILITY: MENTAL HEALTH: HOW OFTEN DO YOU HAVE A DRINK CONTAINING ALCOHOL?: NEVER

## 2019-10-28 SDOH — ECONOMIC STABILITY: INCOME INSECURITY: HOW HARD IS IT FOR YOU TO PAY FOR THE VERY BASICS LIKE FOOD, HOUSING, MEDICAL CARE, AND HEATING?: NOT HARD AT ALL

## 2019-10-28 SDOH — ECONOMIC STABILITY: FOOD INSECURITY: WITHIN THE PAST 12 MONTHS, YOU WORRIED THAT YOUR FOOD WOULD RUN OUT BEFORE YOU GOT MONEY TO BUY MORE.: NEVER TRUE

## 2019-10-28 SDOH — SOCIAL STABILITY: SOCIAL NETWORK
DO YOU BELONG TO ANY CLUBS OR ORGANIZATIONS SUCH AS CHURCH GROUPS UNIONS, FRATERNAL OR ATHLETIC GROUPS, OR SCHOOL GROUPS?: NO

## 2019-10-28 SDOH — HEALTH STABILITY: MENTAL HEALTH: HOW MANY STANDARD DRINKS CONTAINING ALCOHOL DO YOU HAVE ON A TYPICAL DAY?: PATIENT DECLINED

## 2019-10-28 SDOH — HEALTH STABILITY: MENTAL HEALTH
STRESS IS WHEN SOMEONE FEELS TENSE, NERVOUS, ANXIOUS, OR CAN'T SLEEP AT NIGHT BECAUSE THEIR MIND IS TROUBLED. HOW STRESSED ARE YOU?: TO SOME EXTENT

## 2019-10-28 SDOH — ECONOMIC STABILITY: FOOD INSECURITY: WITHIN THE PAST 12 MONTHS, THE FOOD YOU BOUGHT JUST DIDN'T LAST AND YOU DIDN'T HAVE MONEY TO GET MORE.: NEVER TRUE

## 2019-10-28 SDOH — ECONOMIC STABILITY: TRANSPORTATION INSECURITY
IN THE PAST 12 MONTHS, HAS LACK OF TRANSPORTATION KEPT YOU FROM MEETINGS, WORK, OR FROM GETTING THINGS NEEDED FOR DAILY LIVING?: NO

## 2019-10-28 SDOH — SOCIAL STABILITY: SOCIAL NETWORK: IN A TYPICAL WEEK, HOW MANY TIMES DO YOU TALK ON THE PHONE WITH FAMILY, FRIENDS, OR NEIGHBORS?: PATIENT DECLINED

## 2019-10-28 ASSESSMENT — PATIENT HEALTH QUESTIONNAIRE - PHQ9
SUM OF ALL RESPONSES TO PHQ QUESTIONS 1-9: 4
SUM OF ALL RESPONSES TO PHQ QUESTIONS 1-9: 4
10. IF YOU CHECKED OFF ANY PROBLEMS, HOW DIFFICULT HAVE THESE PROBLEMS MADE IT FOR YOU TO DO YOUR WORK, TAKE CARE OF THINGS AT HOME, OR GET ALONG WITH OTHER PEOPLE: SOMEWHAT DIFFICULT

## 2019-10-28 ASSESSMENT — ANXIETY QUESTIONNAIRES
4. TROUBLE RELAXING: SEVERAL DAYS
GAD7 TOTAL SCORE: 5
2. NOT BEING ABLE TO STOP OR CONTROL WORRYING: SEVERAL DAYS
3. WORRYING TOO MUCH ABOUT DIFFERENT THINGS: SEVERAL DAYS
7. FEELING AFRAID AS IF SOMETHING AWFUL MIGHT HAPPEN: NOT AT ALL
6. BECOMING EASILY ANNOYED OR IRRITABLE: SEVERAL DAYS
1. FEELING NERVOUS, ANXIOUS, OR ON EDGE: SEVERAL DAYS
7. FEELING AFRAID AS IF SOMETHING AWFUL MIGHT HAPPEN: NOT AT ALL
5. BEING SO RESTLESS THAT IT IS HARD TO SIT STILL: NOT AT ALL

## 2019-10-28 ASSESSMENT — MIFFLIN-ST. JEOR: SCORE: 1769.49

## 2019-10-28 NOTE — PATIENT INSTRUCTIONS
ww  Before Your Surgery      Call your surgeon if there is any change in your health. This includes signs of a cold or flu (such as a sore throat, runny nose, cough, rash or fever).    Do not smoke, drink alcohol or take over the counter medicine (unless your surgeon or primary care doctor tells you to) for the 24 hours before and after surgery.    If you take prescribed drugs: Follow your doctor s orders about which medicines to take and which to stop until after surgery.    Eating and drinking prior to surgery: follow the instructions from your surgeon    Take a shower or bath the night before surgery. Use the soap your surgeon gave you to gently clean your skin. If you do not have soap from your surgeon, use your regular soap. Do not shave or scrub the surgery site.  Wear clean pajamas and have clean sheets on your bed.

## 2019-10-28 NOTE — Clinical Note
Please abstract the following data from this visit with this patient into the appropriate field in Epic:Tests that can be patient reported without a hard copy:Eye exam with ophthalmology on this date: 08/30/2019Other Tests found in the patient's chart through Chart Review/Care Everywhere:Note to Abstraction: If this section is blank, no results were found via Chart Review/Care Everywhere.

## 2019-10-28 NOTE — PROGRESS NOTES
Subjective     Bhavani White is a 63 year old female who presents to clinic today for the following health issues:    History of Present Illness      Asthma:  She presents for follow up of asthma.  She has no cough, some wheezing, and some shortness of breath. She is using a relief medication 2-3 times per day. She does not miss any doses of her controller medication throughout the week.Patient is aware of the following triggers: cold air, exercise or sports, mold and smoke. The patient has had a visit to the Emergency Room, Urgent Care or Hospital due to asthma since the last clinic visit. She has been to the Emergency Room or Urgent Care 0 times.She has had a Hospitalization 0 times.    Diabetes:   She presents for follow up of diabetes.  She is not checking blood glucose. Blood glucose is never over 200 and sometimes over 70. She is aware of hypoglycemia symptoms including none, shakiness and lethargy. She is concerned about low blood sugar, several less than 70 in the past few weeks. She is having numbness in feet, redness, sores, or blisters on feet and weight gain. The patient has had a diabetic eye exam in the last 12 months. Eye exam performed on 8/30/19.    Diabetes Management Resources    Hyperlipidemia:  She presents for follow up of hyperlipidemia.  She reports increased sweating or nausea with activity. She is taking medication to lower cholesterol. She is not having myalgia or other side effects to statin medications.    Hypertension: She presents for follow up of hypertension.  She does not check blood pressure  regularly outside of the clinic. Outside blood pressures have been over 140/90. She follows a low salt diet.     Hypothyroidism:     Since last visit, patient describes the following symptoms::  Anxiety, Constipation, Depression, Fatigue, Hair loss and Weight gain    Weight gain::  16-20 lbs.    Vascular Disease:  She presents for follow up of vascular disease.  She is having increased  sweating or nausea with activity. She never takes nitroglycerin. She takes daily aspirin.    She eats 2-3 servings of fruits and vegetables daily.She consumes 3 sweetened beverage(s) daily.  She is taking medications regularly.     For diabetes  Currently on taking 1 tab of metformin due to feeling shaky.  Does not really check her blood sugars.         PRE-OP EVALUATION:  Today's date: 10/28/2019    Bhavani White (: 1955) presents for pre-operative evaluation assessment as requested by Dr. Rios.  She requires evaluation and anesthesia risk assessment prior to undergoing surgery/procedure for treatment of bladder injections .    Fax number for surgical facility: [phone]  Primary Physician: [facility]  Type of Anesthesia Anticipated: General    Patient has a Health Care Directive or Living Will:  YES    Preop Questions 10/28/2019   Who is doing your surgery? Dr Yoandy Rios   What are you having done? [facility]   Date of Surgery/Procedure: 2019   Facility or Hospital where procedure/surgery will be performed: [facility]   1.  Do you have a history of Heart attack, stroke, stent, coronary bypass surgery, or other heart surgery? UNKNOWN    2.  Do you ever have any pain or discomfort in your chest? No   3.  Do you have a history of  Heart Failure? No   4.   Are you troubled by shortness of breath when:  walking on a level surface, or up a slight hill, or at night? UNKNOWN    5.  Do you currently have a cold, bronchitis or other respiratory infection? No   6.  Do you have a cough, shortness of breath, or wheezing? YES   7.  Do you sometimes get pains in the calves of your legs when you walk? UNKNOWN    8. Do you or anyone in your family have previous history of blood clots? No   9.  Do you or does anyone in your family have a serious bleeding problem such as prolonged bleeding following surgeries or cuts? No   10. Have you ever had problems with anemia or  been told to take iron pills? No   11. Have you had any abnormal blood loss such as black, tarry or bloody stools, or abnormal vaginal bleeding? YES    12. Have you ever had a blood transfusion? YES    13. Have you or any of your relatives ever had problems with anesthesia? No   14. Do you have sleep apnea, excessive snoring or daytime drowsiness? No   15. Do you have any prosthetic heart valves? No   16. Do you have prosthetic joints? YES    17. Is there any chance that you may be pregnant? No         HPI:     HPI related to upcoming procedure: hx of neurogenic bladder with suprapubic catheter. Botox injections every 6 months are deemed the next best step in treatment.       DIABETES - Patient has a longstanding history of DiabetesType Type II . Patient is being treated with oral agents and denies significant side effects. Control has been fair. Complicating factors include but are not limited to: hyperlipidemia, chronic kidney disease and morbid obesity .       MEDICAL HISTORY:     Patient Active Problem List    Diagnosis Date Noted     Methadone use (H) 2017     Priority: Medium     Morbid obesity, unspecified obesity type (H) 2016     Priority: Medium     Osteoarthritis of cervical spine with myelopathy 2016     Priority: Medium     DISH (diffuse idiopathic skeletal hyperostosis) 2016     Priority: Medium     Wheelchair bound 08/15/2015     Priority: Medium     Personal history of methicillin resistant Staphylococcus aureus 2015     Priority: Medium     Neurogenic bladder 2015     Priority: Medium     Ventral hernia 2015     Priority: Medium     History of ulcer disease 2014     Priority: Medium     She notes from NSAIDs; nearly . Discussed a hospitalization at [place] for this.       Chronic narcotic dependence (H) 2014     Priority: Medium     Recurrent UTI (urinary tract infection) 10/24/2014     Priority: Medium     Controlled substance agreement  signed 10/24/2014 10/24/2014     Priority: Medium     She had a controlled substance agreement with [employer] until seen by pain clinic. Now followed by pain clinic; see chronic pain       Fracture of lower extremity 06/26/2013     Priority: Medium     ACP (advance care planning) 02/26/2013     Priority: Medium     Advance Care Planning:   Receipt of ACP document:  Received: Resuscitation Guidelines order which was witnessed and signed by provider on 7-22-09 and 3-18-10.  Document previously scanned on 7-27-09 and 3-19-10. Order reviewed and found to be valid.  Code Status  needs to be updated to reflect choices in most recent ACP document. Confirmed/documented designated decision maker(s). See permanent comments section of demographics in clinical tab. View document(s) and details by clicking on code status. Added by Jie Stewart RN System ACP Cordinator on 2/26/2013.             Hyperlipidemia LDL goal <100 05/13/2011     Priority: Medium     Neuropathy 02/21/2011     Priority: Medium     Asthma 02/15/2011     Priority: Medium     Suprapubic catheter (H) 02/11/2011     Priority: Medium     Lumbar spinal stenosis 02/11/2011     Priority: Medium     Fibromyalgia 02/11/2011     Priority: Medium     Osteoarthritis 02/11/2011     Priority: Medium     Hypertension goal BP (blood pressure) < 140/90 02/11/2011     Priority: Medium     Health Care Home 02/11/2011     Priority: Medium       Status:  No active care coordination   Care Coordinator:  Wendy Pearson [phone]  See Letters for [facility] Care Plan  Date:  May 23, 2016           Hypothyroidism due to acquired atrophy of thyroid 11/05/2010     Priority: Medium     Type 2 diabetes mellitus with diabetic nephropathy (H) 10/31/2010     Priority: Medium     Chronic low back pain 05/28/2009     Priority: Medium     1981       Moderate major depression (H) 05/28/2009     Priority: Medium     Anxiety 05/28/2009     Priority: Medium      Past Medical History:    Diagnosis Date     Anxiety      Asthma      Depression      DM (diabetes mellitus) (H)      Frequent UTI      History of blood transfusion      History of ulcer disease 2014    She notes from NSAIDs; nearly . Discussed a hospitalization at [place] for this.     Hypertension      Hypothyroid      Iatrogenic Cushing's disease (H)     off prednisone now     Morbid obesity (H)      MRSA (methicillin resistant staph aureus) culture positive 2012    repeat cx 10/12/2012 no staph mentioned.     Osteoarthritis     multiple joings.     Other chronic pain      Sleep apnea     No longer uses cpap.     Past Surgical History:   Procedure Laterality Date     ABDOMEN SURGERY       C LAMINECTOMY,FACETECTOMY,LUMBAR  1982     C TOTAL KNEE ARTHROPLASTY      left     CYSTOSCOPY N/A 2018    Procedure: CYSTOSCOPY;  Cystoscopy and Botox Injection Into the Bladder and suprapubic tube exchange;  Surgeon: Yoandy Rios MD;  Location: [facility]     CYSTOSCOPY N/A 3/15/2019    Procedure: Cystoscopy, suprapubic tube exchange;  Surgeon: Yoandy Rios MD;  Location: [facility]     CYSTOSCOPY, INTRAVESICAL INJECTION N/A 2017    Procedure: CYSTOSCOPY, INTRAVESICAL INJECTION;  Cystoscopy, Botox Injection Into The Bladder  Latex Allergy ;  Surgeon: Yoandy Rios MD;  [facility]     CYSTOSCOPY, INTRAVESICAL INJECTION N/A 3/8/2018    Procedure: CYSTOSCOPY, INTRAVESICAL INJECTION;  Cystoscopy, Botox Injection Into The Bladder and suprapubic catheter exchange;  Surgeon: Yoandy Rios MD;  [facility]     DISCECTOMY, FUSION CERVICAL ANTERIOR THREE+ LEVELS, COMBINED Left 5/3/2016    Procedure: COMBINED DISCECTOMY, FUSION CERVICAL ANTERIOR THREE+ LEVELS;  Surgeon: Jasvir Torres MD;  Location: [facility]     GENITOURINARY SURGERY      suprapubic catheter     HERNIA REPAIR      double hernia     HYSTERECTOMY, PAP NO LONGER INDICATED      CELIA and large ovarian tumor removed      INJECT BOTOX N/A 9/21/2018    Procedure: INJECT BOTOX;;  Surgeon: Yoandy Rios MD;  [facility]     INJECT BOTOX N/A 3/15/2019    Procedure: Inject Botox into Bladder;  Surgeon: Yoandy Rios MD;  Location: [facility]     SURGICAL HISTORY OF -       left cataract surgery     SURGICAL HISTORY OF -   12/14    right cataract surgery and left laser revision     SURGICAL HISTORY OF -   March 2015    left carpal tunnel release     TONSILLECTOMY  1974     Current Outpatient Medications   Medication Sig Dispense Refill     ciprofloxacin (CIPRO) 500 MG tablet Take 1 tablet (500 mg) by mouth 2 times daily for 7 days 14 tablet 0     lisinopril (PRINIVIL/ZESTRIL) 5 MG tablet Take 1 tablet (5 mg) by mouth daily 30 tablet 0     albuterol (PROVENTIL) (2.5 MG/3ML) 0.083% neb solution Take 1 vial (2.5 mg) by nebulization every 4 hours as needed for shortness of breath / dyspnea or wheezing 30 vial 11     aspirin 81 MG tablet Take by mouth daily 30 tablet      blood glucose (NO BRAND SPECIFIED) lancets standard Use to test blood sugar 1 times daily or as directed. 100 each 11     blood glucose monitoring (FREESTYLE FREEDOM LITE) meter device kit Use to test blood sugar. 1 kit 0     blood glucose monitoring (NO BRAND SPECIFIED) meter device kit Dispense freestyle zak glucose meter 1 kit 0     blood glucose monitoring (NO BRAND SPECIFIED) test strip Use to test blood sugars 1 times daily or as directed 100 strip 11     ciprofloxacin (CIPRO) 500 MG tablet Take 1 tablet (500 mg) by mouth 2 times daily 10 tablet 0     DULoxetine HCl (CYMBALTA PO) Take 120 mg by mouth daily        gabapentin (NEURONTIN) 600 MG tablet 1,200 mg 3 times daily   2     levalbuterol (XOPENEX) 1.25 MG/3ML neb solution TAKE ONE VIAL BY NEBULIZATION EVERY 8 HOURS AS NEEDED FOR SHORTNESS OF BREATH/DYSPNEA OR WHEEZING 825 mL 1     levothyroxine (SYNTHROID/LEVOTHROID) 88 MCG tablet TAKE 1 TABLET(88 MCG) BY MOUTH DAILY 90 tablet 0     metFORMIN  (GLUCOPHAGE-XR) 500 MG 24 hr tablet TAKE 1 TABLET BY MOUTH TWICE DAILY WITH MEALS 60 tablet 0     methadone (DOLOPHINE) 10 MG tablet Take 2 pills  tablet 0     MIRALAX OR 1 capful daily prn       MULTIVITAMIN OR 1 a day       nitroFURantoin macrocrystal-monohydrate (MACROBID) 100 MG capsule Take 1 capsule (100 mg) by mouth 2 times daily 6 capsule 0     nystatin (MYCOSTATIN) 725475 UNIT/GM POWD Apply topically 3 times daily as needed 30 g 1     ondansetron (ZOFRAN ODT) 4 MG ODT tab Take 1-2 tablets (4-8 mg) by mouth every 8 hours as needed for nausea 20 tablet 1     order for DME Equipment being ordered: Nebulizer 1 each 0     order for DME Equipment being ordered: BIPAP.   15/5, Rate of 12, 2L O2 to keep sats 90-95%. 1 each 0     order for DME Equipment being ordered: Nebulizer 1 Package 0     order for DME Equipment being ordered: Hospital Bed, total electric (head, foot, and height adjustments), with any type side rails, with mattress 1 each 0     order for DME Equipment being ordered: Motorized Wheelchair 1 each 0     order for DME Equipment being ordered: Trapeze bar for hospital bed 1 each 0     oxybutynin ER (DITROPAN XL) 15 MG 24 hr tablet Take 1 tablet (15 mg) by mouth daily 90 tablet 3     Phenazopyridine HCl (PYRIDIUM PO) Take 2 tablets by mouth 3 times daily as needed Dose unknown       senna-docusate (SENOKOT-S;PERICOLACE) 8.6-50 MG per tablet Take 2 tablets by mouth 2 times daily 100 tablet      senna-docusate (SENOKOT-S;PERICOLACE) 8.6-50 MG per tablet Take 2 tablets by mouth 2 times daily To prevent constipation while taking narcotic pain medication. Start with 1 tablet twice daily. If no bowel movement in 24 hours, increase to 2 tablets twice daily.  Discontinue if you have loose stools or when you are no longer taking narcotics. 30 tablet 0     simvastatin (ZOCOR) 20 MG tablet TAKE 1 TABLET(20 MG) BY MOUTH AT BEDTIME 90 tablet 3     tiZANidine (ZANAFLEX) 4 MG tablet Take 1 tablet (4 mg) by  "mouth 3 times daily 90 tablet      VENTOLIN  (90 Base) MCG/ACT inhaler INHALE 2 PUFFS INTO THE LUNGS EVERY 6 HOURS AS NEEDED FOR SHORTNESS OF BREATH 18 g 0     VENTOLIN  (90 Base) MCG/ACT inhaler INHALE 2 PUFFS INTO THE LUNGS EVERY 6 HOURS AS NEEDED FOR SHORTNESS OF BREATH 18 g 0     OTC products: no recent use of OTC ASA, NSAIDS or Steroids    Allergies   Allergen Reactions     Povidone Iodine      \"mild skin irritation\" per patient report     Toradol [Ketorolac] Other (See Comments)     Pt gets nightmares      Latex Allergy: NO    Social History     Tobacco Use     Smoking status: Never Smoker     Smokeless tobacco: Never Used   Substance Use Topics     Alcohol use: No     Frequency: Never     Drinks per session: Patient refused     Binge frequency: Patient refused     History   Drug Use     Types: Marijuana     Comment: Medical canibis       REVIEW OF SYSTEMS:   Constitutional, neuro, ENT, endocrine, pulmonary, cardiac, gastrointestinal, genitourinary, musculoskeletal, integument and psychiatric systems are negative, except as otherwise noted.    EXAM:   BP (!) 142/84 (BP Location: Left arm, Patient Position: Chair, Cuff Size: Adult Large)   Pulse 106   Temp 98.4  F (36.9  C) (Oral)   Ht 1.638 m (5' 4.5\")   Wt 121.7 kg (268 lb 3.2 oz)   SpO2 95%   BMI 45.33 kg/m      GENERAL APPEARANCE: healthy, alert and no distress     EYES: EOMI, PERRL     NECK: no adenopathy     RESP: lungs clear to auscultation - no rales, rhonchi or wheezes     CV: regular rates and rhythm, normal S1 S2     ABDOMEN:  soft, nontender,  and bowel sounds normal     MS: wheelchair bound      SKIN: no suspicious lesions or rashes     PSYCH: mentation appears normal. and affect normal/bright    DIAGNOSTICS:   Hemoglobin A1C    Recent Labs   Lab Test 05/21/19  1121 04/26/19  1540 03/11/19  1431 01/29/19  1050  07/26/17  1532  04/18/16  1528   HGB  --  17.1* 17.5*  --    < > 14.5   < > 15.5   PLT  --  241 225  --    < > 211   < " > 198   INR  --   --   --   --   --  0.97  --  1.00    132* 135 136   < > 136   < > 135   POTASSIUM 4.5 4.2 5.1 4.4   < > 4.7   < > 4.3   CR 0.64 0.85 0.76 0.79   < > 0.80   < > 0.63   A1C 7.2*  --   --  6.4*   < >  --    < > 7.9*    < > = values in this interval not displayed.        IMPRESSION:   Reason for surgery/procedure: Neurogenic bladder/ botox injections  Diagnosis/reason for consult: Preoperative consult     The proposed surgical procedure is considered INTERMEDIATE risk.    REVISED CARDIAC RISK INDEX  The patient has the following serious cardiovascular risks for perioperative complications such as (MI, PE, VFib and 3  AV Block):  No serious cardiac risks  INTERPRETATION: 0 risks: Class I (very low risk - 0.4% complication rate)    The patient has the following additional risks for perioperative complications:  No identified additional risks  Morbid obesity      ICD-10-CM    1. Preop general physical exam Z01.818 *UA reflex to Microscopic and Culture ([facility] (except [place] and [place])     Urine Microscopic   2. Suprapubic catheter (H) Z93.59 Urine Culture Aerobic Bacterial   3. Neurogenic bladder N31.9 Urine Culture Aerobic Bacterial   4. Hypertension goal BP (blood pressure) < 140/90 I10 lisinopril (PRINIVIL/ZESTRIL) 5 MG tablet     [facility] BP Flowsheet   5. Type 2 diabetes mellitus with diabetic nephropathy, without long-term current use of insulin (H) E11.21 Hemoglobin A1c   6. Hypothyroidism due to acquired atrophy of thyroid E03.4 TSH with free T4 reflex   7. Nonspecific finding on examination of urine R82.90 Urine Culture Aerobic Bacterial   8. Recurrent UTI (urinary tract infection) N39.0 ciprofloxacin (CIPRO) 500 MG tablet       RECOMMENDATIONS:       APPROVAL GIVEN to proceed with proposed procedure, without further diagnostic evaluation     UA today positive for UTI- will start on Abx.  A1c needs better control- recommend going back to 2 tabs metformin daily        Signed Electronically by: Skylar Moore MD    Copy of this evaluation report is provided to requesting physician.    [place] Preop Guidelines    Revised Cardiac Risk Index

## 2019-10-28 NOTE — TELEPHONE ENCOUNTER
"Requested Prescriptions   Pending Prescriptions Disp Refills     lisinopril (PRINIVIL/ZESTRIL) 5 MG tablet [Pharmacy Med Name: LISINOPRIL 5MG TABLETS]  Medication may not be due for refill  Last Written Prescription Date:  10/28/2019  Last Fill Quantity: 30 tablet,  # refills: 0   Last office visit: No previous visit found with prescribing provider:  10/28/2019, Oscar   Future Office Visit:     90 tablet 0     Sig: TAKE 1 TABLET(5 MG) BY MOUTH DAILY       ACE Inhibitors (Including Combos) Protocol Failed - 10/28/2019  3:59 PM        Failed - Blood pressure under 140/90 in past 12 months     BP Readings from Last 3 Encounters:   10/28/19 (!) 142/84   08/29/19 126/78   04/26/19 122/83                 Passed - Recent (12 mo) or future (30 days) visit within the authorizing provider's specialty     Patient has had an office visit with the authorizing provider or a provider within the authorizing providers department within the previous 12 mos or has a future within next 30 days. See \"Patient Info\" tab in inbasket, or \"Choose Columns\" in Meds & Orders section of the refill encounter.              Passed - Medication is active on med list        Passed - Patient is age 18 or older        Passed - No active pregnancy on record        Passed - Normal serum creatinine on file in past 12 months     Recent Labs   Lab Test 05/21/19  1121  06/28/17  1446   CR 0.64   < >  --    CREAT  --   --  0.9    < > = values in this interval not displayed.             Passed - Normal serum potassium on file in past 12 months     Recent Labs   Lab Test 05/21/19  1121   POTASSIUM 4.5             Passed - No positive pregnancy test within past 12 months          "

## 2019-10-29 ENCOUNTER — ANESTHESIA EVENT (OUTPATIENT)
Dept: SURGERY | Facility: AMBULATORY SURGERY CENTER | Age: 64
End: 2019-10-29

## 2019-10-29 LAB — TSH SERPL DL<=0.005 MIU/L-ACNC: 0.86 MU/L (ref 0.4–4)

## 2019-10-29 RX ORDER — LISINOPRIL 5 MG/1
TABLET ORAL
Qty: 90 TABLET | Refills: 0 | OUTPATIENT
Start: 2019-10-29

## 2019-10-29 ASSESSMENT — PATIENT HEALTH QUESTIONNAIRE - PHQ9: SUM OF ALL RESPONSES TO PHQ QUESTIONS 1-9: 4

## 2019-10-29 NOTE — ANESTHESIA PREPROCEDURE EVALUATION
Anesthesia Pre-Procedure Evaluation    Patient: Bhavani White   MRN:     [id number] Gender:   female   Age:    63 year old :      1955        Preoperative Diagnosis: Neurogenic Bladder   Procedure(s):  Cystoscopy, Bladder Botox Injection, Suprapubic Tube Change  INJECT BOTOX     Past Medical History:   Diagnosis Date     Anxiety      Asthma      Depression      DM (diabetes mellitus) (H)      Frequent UTI      History of blood transfusion      History of ulcer disease 2014    She notes from NSAIDs; nearly . Discussed a hospitalization at [facility] for this.     Hypertension      Hypothyroid      Iatrogenic Cushing's disease (H)     off prednisone now     Morbid obesity (H)      MRSA (methicillin resistant staph aureus) culture positive 2012    repeat cx 10/12/2012 no staph mentioned.     Osteoarthritis     multiple joings.     Other chronic pain      Sleep apnea     No longer uses cpap.      Past Surgical History:   Procedure Laterality Date     ABDOMEN SURGERY       C LAMINECTOMY,FACETECTOMY,LUMBAR  1982     C TOTAL KNEE ARTHROPLASTY      left     CYSTOSCOPY N/A 2018    Procedure: CYSTOSCOPY;  Cystoscopy and Botox Injection Into the Bladder and suprapubic tube exchange;  Surgeon: Yoandy Rios MD;  [facility]     CYSTOSCOPY N/A 3/15/2019    Procedure: Cystoscopy, suprapubic tube exchange;  Surgeon: Yoandy Rios MD;  [facility]     CYSTOSCOPY, INTRAVESICAL INJECTION N/A 2017    Procedure: CYSTOSCOPY, INTRAVESICAL INJECTION;  Cystoscopy, Botox Injection Into The Bladder  Latex Allergy ;  Surgeon: Yoandy Rios MD;  [facility]     CYSTOSCOPY, INTRAVESICAL INJECTION N/A 3/8/2018    Procedure: CYSTOSCOPY, INTRAVESICAL INJECTION;  Cystoscopy, Botox Injection Into The Bladder and suprapubic catheter exchange;  Surgeon: Yoandy Rios MD;  [facility]     DISCECTOMY, FUSION CERVICAL ANTERIOR THREE+ LEVELS, COMBINED Left 5/3/2016     Procedure: COMBINED DISCECTOMY, FUSION CERVICAL ANTERIOR THREE+ LEVELS;  Surgeon: Jasvir Torres MD;  [facility]     GENITOURINARY SURGERY      suprapubic catheter     HERNIA REPAIR  1957    double hernia     HYSTERECTOMY, PAP NO LONGER INDICATED      CELIA and large ovarian tumor removed     INJECT BOTOX N/A 9/21/2018    Procedure: INJECT BOTOX;;  Surgeon: Yoandy Rios MD;  [facility]     INJECT BOTOX N/A 3/15/2019    Procedure: Inject Botox into Bladder;  Surgeon: Yoandy Rios MD;  [facility]     SURGICAL HISTORY OF -       left cataract surgery     SURGICAL HISTORY OF -   12/14    right cataract surgery and left laser revision     SURGICAL HISTORY OF -   March 2015    left carpal tunnel release     TONSILLECTOMY  1974                   PHYSICAL EXAM:   Mental Status/Neuro: A/A/O   Airway: Facies: Feasible  Mallampati: II  Mouth/Opening: Full  TM distance: > 6 cm  Neck ROM: Full   Respiratory: Auscultation: CTAB     Resp. Rate: Normal     Resp. Effort: Normal      CV: Rhythm: Regular  Rate: Age appropriate  Heart: Normal Sounds   Comments:                      LABS:  CBC:   Lab Results   Component Value Date    WBC 13.2 (H) 04/26/2019    WBC 10.2 03/11/2019    HGB 17.1 (H) 04/26/2019    HGB 17.5 (H) 03/11/2019    HCT 52.8 (H) 04/26/2019    HCT 52.7 (H) 03/11/2019     04/26/2019     03/11/2019     BMP:   Lab Results   Component Value Date     05/21/2019     (L) 04/26/2019    POTASSIUM 4.5 05/21/2019    POTASSIUM 4.2 04/26/2019    CHLORIDE 99 05/21/2019    CHLORIDE 93 (L) 04/26/2019    CO2 30 05/21/2019    CO2 36 (H) 04/26/2019    BUN 19 05/21/2019    BUN 39 (H) 04/26/2019    CR 0.64 05/21/2019    CR 0.85 04/26/2019     (H) 05/21/2019     (H) 04/26/2019     COAGS:   Lab Results   Component Value Date    PTT 25 04/18/2016    INR 0.97 07/26/2017    FIBR 576 (H) 01/19/2009     POC:   Lab Results   Component Value Date     (H)  "03/15/2019     OTHER:   Lab Results   Component Value Date    PH 7.34 (L) 05/15/2016    LACT 1.7 12/10/2017    A1C 7.3 (H) 10/28/2019    DORY 10.5 (H) 05/21/2019    MAG 2.5 (H) 01/19/2009    ALBUMIN 3.8 05/21/2019    PROTTOTAL 7.6 05/21/2019    ALT 40 05/21/2019    AST 31 05/21/2019    ALKPHOS 90 05/21/2019    BILITOTAL 0.4 05/21/2019    LIPASE 98 12/10/2017    AMYLASE 44 01/08/2009    TSH 0.86 10/28/2019    T4 1.03 08/15/2018    CRP 30.0 (H) 05/16/2016    SED 11 03/04/2010        Preop Vitals    BP Readings from Last 3 Encounters:   10/28/19 (!) 142/84   08/29/19 126/78   04/26/19 122/83    Pulse Readings from Last 3 Encounters:   10/28/19 106   08/29/19 89   04/26/19 65      Resp Readings from Last 3 Encounters:   08/29/19 14   04/26/19 20   03/15/19 16    SpO2 Readings from Last 3 Encounters:   10/28/19 95%   08/29/19 98%   04/26/19 94%      Temp Readings from Last 1 Encounters:   10/28/19 36.9  C (98.4  F) (Oral)    Ht Readings from Last 1 Encounters:   10/28/19 1.638 m (5' 4.5\")      Wt Readings from Last 1 Encounters:   10/28/19 121.7 kg (268 lb 3.2 oz)    Estimated body mass index is 45.33 kg/m  as calculated from the following:    Height as of 10/28/19: 1.638 m (5' 4.5\").    Weight as of 10/28/19: 121.7 kg (268 lb 3.2 oz).     LDA:  Suprapubic Catheter Double-lumen;Latex 24 fr (Active)   Number of days: 228        Assessment:   ASA SCORE: 3    H&P: History and physical reviewed and following examination; no interval change.    NPO Status: NPO Appropriate     Plan:   Anes. Type:  MAC   Pre-Medication: None   Induction:  N/a   Airway: Native Airway   Access/Monitoring: PIV   Maintenance: N/a     Postop Plan:   Postop Pain: None  Postop Sedation/Airway: Not planned  Disposition: Outpatient     PONV Management: Adult Risk Factors: Female   Prevention: Ondansetron, Dexamethasone     CONSENT: Direct conversation   Plan and risks discussed with: Patient          Comments for Plan/Consent:  Over BMI cutoff. Approved " by director.                  Gerry Dillon MD     ANESTHESIA PREOP EVALUATION    PROCEDURE: Procedure(s):  Cystoscopy, Bladder Botox Injection, Suprapubic Tube Change  INJECT BOTOX    HPI: Bhavani White is a 63 year old female who presents for above procedure 2/2    PAST MEDICAL HISTORY:    Past Medical History:   Diagnosis Date     Anxiety      Asthma      Depression      DM (diabetes mellitus) (H)      Frequent UTI      History of blood transfusion      History of ulcer disease 2014    She notes from NSAIDs; nearly . Discussed a hospitalization at [place] for this.     Hypertension      Hypothyroid      Iatrogenic Cushing's disease (H)     off prednisone now     Morbid obesity (H)      MRSA (methicillin resistant staph aureus) culture positive 2012    repeat cx 10/12/2012 no staph mentioned.     Osteoarthritis     multiple joings.     Other chronic pain      Sleep apnea     No longer uses cpap.       PAST SURGICAL HISTORY:    Past Surgical History:   Procedure Laterality Date     ABDOMEN SURGERY       C LAMINECTOMY,FACETECTOMY,LUMBAR  1982     C TOTAL KNEE ARTHROPLASTY      left     CYSTOSCOPY N/A 2018    Procedure: CYSTOSCOPY;  Cystoscopy and Botox Injection Into the Bladder and suprapubic tube exchange;  Surgeon: Yoandy Rios MD;  [facility]     CYSTOSCOPY N/A 3/15/2019    Procedure: Cystoscopy, suprapubic tube exchange;  Surgeon: Yoandy Rios MD;  [facility]     CYSTOSCOPY, INTRAVESICAL INJECTION N/A 2017    Procedure: CYSTOSCOPY, INTRAVESICAL INJECTION;  Cystoscopy, Botox Injection Into The Bladder  Latex Allergy ;  Surgeon: Yoandy Rios MD;  [facility]     CYSTOSCOPY, INTRAVESICAL INJECTION N/A 3/8/2018    Procedure: CYSTOSCOPY, INTRAVESICAL INJECTION;  Cystoscopy, Botox Injection Into The Bladder and suprapubic catheter exchange;  Surgeon: Yoandy Rios MD;  [facility]     DISCECTOMY, FUSION CERVICAL ANTERIOR THREE+  "LEVELS, COMBINED Left 5/3/2016    Procedure: COMBINED DISCECTOMY, FUSION CERVICAL ANTERIOR THREE+ LEVELS;  Surgeon: Jasvir Torres MD;  [facility]     GENITOURINARY SURGERY      suprapubic catheter     HERNIA REPAIR  1957    double hernia     HYSTERECTOMY, PAP NO LONGER INDICATED      CELIA and large ovarian tumor removed     INJECT BOTOX N/A 9/21/2018    Procedure: INJECT BOTOX;;  Surgeon: Yoandy Rios MD;  [facility]     INJECT BOTOX N/A 3/15/2019    Procedure: Inject Botox into Bladder;  Surgeon: Yoandy Rios MD;  [facility]     SURGICAL HISTORY OF -       left cataract surgery     SURGICAL HISTORY OF -   12/14    right cataract surgery and left laser revision     SURGICAL HISTORY OF -   March 2015    left carpal tunnel release     TONSILLECTOMY  1974       SOCIAL HISTORY:       Social History     Tobacco Use     Smoking status: Never Smoker     Smokeless tobacco: Never Used   Substance Use Topics     Alcohol use: No     Frequency: Never     Drinks per session: Patient refused     Binge frequency: Patient refused       ALLERGIES:     Allergies   Allergen Reactions     Povidone Iodine      \"mild skin irritation\" per patient report     Toradol [Ketorolac] Other (See Comments)     Pt gets nightmares       MEDICATIONS:     (Not in a hospital admission)      Current Outpatient Medications   Medication Sig Dispense Refill     albuterol (PROVENTIL) (2.5 MG/3ML) 0.083% neb solution Take 1 vial (2.5 mg) by nebulization every 4 hours as needed for shortness of breath / dyspnea or wheezing 30 vial 11     aspirin 81 MG tablet Take by mouth daily 30 tablet      blood glucose (NO BRAND SPECIFIED) lancets standard Use to test blood sugar 1 times daily or as directed. 100 each 11     blood glucose monitoring (FREESTYLE FREEDOM LITE) meter device kit Use to test blood sugar. 1 kit 0     blood glucose monitoring (NO BRAND SPECIFIED) meter device kit Dispense freestyle zak glucose meter 1 " kit 0     blood glucose monitoring (NO BRAND SPECIFIED) test strip Use to test blood sugars 1 times daily or as directed 100 strip 11     ciprofloxacin (CIPRO) 500 MG tablet Take 1 tablet (500 mg) by mouth 2 times daily for 7 days 14 tablet 0     ciprofloxacin (CIPRO) 500 MG tablet Take 1 tablet (500 mg) by mouth 2 times daily 10 tablet 0     DULoxetine HCl (CYMBALTA PO) Take 120 mg by mouth daily        gabapentin (NEURONTIN) 600 MG tablet 1,200 mg 3 times daily   2     levalbuterol (XOPENEX) 1.25 MG/3ML neb solution TAKE ONE VIAL BY NEBULIZATION EVERY 8 HOURS AS NEEDED FOR SHORTNESS OF BREATH/DYSPNEA OR WHEEZING 825 mL 1     levothyroxine (SYNTHROID/LEVOTHROID) 88 MCG tablet TAKE 1 TABLET(88 MCG) BY MOUTH DAILY 90 tablet 0     lisinopril (PRINIVIL/ZESTRIL) 5 MG tablet Take 1 tablet (5 mg) by mouth daily 30 tablet 0     metFORMIN (GLUCOPHAGE-XR) 500 MG 24 hr tablet TAKE 1 TABLET BY MOUTH TWICE DAILY WITH MEALS 180 tablet 0     methadone (DOLOPHINE) 10 MG tablet Take 2 pills  tablet 0     MIRALAX OR 1 capful daily prn       MULTIVITAMIN OR 1 a day       nitroFURantoin macrocrystal-monohydrate (MACROBID) 100 MG capsule Take 1 capsule (100 mg) by mouth 2 times daily 6 capsule 0     nystatin (MYCOSTATIN) 207733 UNIT/GM POWD Apply topically 3 times daily as needed 30 g 1     ondansetron (ZOFRAN ODT) 4 MG ODT tab Take 1-2 tablets (4-8 mg) by mouth every 8 hours as needed for nausea 20 tablet 1     order for DME Equipment being ordered: Nebulizer 1 each 0     order for DME Equipment being ordered: BIPAP.   15/5, Rate of 12, 2L O2 to keep sats 90-95%. 1 each 0     order for DME Equipment being ordered: Nebulizer 1 Package 0     order for DME Equipment being ordered: Hospital Bed, total electric (head, foot, and height adjustments), with any type side rails, with mattress 1 each 0     order for DME Equipment being ordered: Motorized Wheelchair 1 each 0     order for DME Equipment being ordered: Trapeze bar for  hospital bed 1 each 0     oxybutynin ER (DITROPAN XL) 15 MG 24 hr tablet Take 1 tablet (15 mg) by mouth daily 90 tablet 3     Phenazopyridine HCl (PYRIDIUM PO) Take 2 tablets by mouth 3 times daily as needed Dose unknown       senna-docusate (SENOKOT-S;PERICOLACE) 8.6-50 MG per tablet Take 2 tablets by mouth 2 times daily 100 tablet      senna-docusate (SENOKOT-S;PERICOLACE) 8.6-50 MG per tablet Take 2 tablets by mouth 2 times daily To prevent constipation while taking narcotic pain medication. Start with 1 tablet twice daily. If no bowel movement in 24 hours, increase to 2 tablets twice daily.  Discontinue if you have loose stools or when you are no longer taking narcotics. 30 tablet 0     simvastatin (ZOCOR) 20 MG tablet TAKE 1 TABLET(20 MG) BY MOUTH AT BEDTIME 90 tablet 3     tiZANidine (ZANAFLEX) 4 MG tablet Take 1 tablet (4 mg) by mouth 3 times daily 90 tablet      VENTOLIN  (90 Base) MCG/ACT inhaler INHALE 2 PUFFS INTO THE LUNGS EVERY 6 HOURS AS NEEDED FOR SHORTNESS OF BREATH 18 g 0     VENTOLIN  (90 Base) MCG/ACT inhaler INHALE 2 PUFFS INTO THE LUNGS EVERY 6 HOURS AS NEEDED FOR SHORTNESS OF BREATH 18 g 0       Current Outpatient Medications Ordered in Epic   Medication Sig Dispense Refill     albuterol (PROVENTIL) (2.5 MG/3ML) 0.083% neb solution Take 1 vial (2.5 mg) by nebulization every 4 hours as needed for shortness of breath / dyspnea or wheezing 30 vial 11     aspirin 81 MG tablet Take by mouth daily 30 tablet      blood glucose (NO BRAND SPECIFIED) lancets standard Use to test blood sugar 1 times daily or as directed. 100 each 11     blood glucose monitoring (FREESTYLE FREEDOM LITE) meter device kit Use to test blood sugar. 1 kit 0     blood glucose monitoring (NO BRAND SPECIFIED) meter device kit Dispense freestyle zak glucose meter 1 kit 0     blood glucose monitoring (NO BRAND SPECIFIED) test strip Use to test blood sugars 1 times daily or as directed 100 strip 11     ciprofloxacin  (CIPRO) 500 MG tablet Take 1 tablet (500 mg) by mouth 2 times daily for 7 days 14 tablet 0     ciprofloxacin (CIPRO) 500 MG tablet Take 1 tablet (500 mg) by mouth 2 times daily 10 tablet 0     DULoxetine HCl (CYMBALTA PO) Take 120 mg by mouth daily        gabapentin (NEURONTIN) 600 MG tablet 1,200 mg 3 times daily   2     levalbuterol (XOPENEX) 1.25 MG/3ML neb solution TAKE ONE VIAL BY NEBULIZATION EVERY 8 HOURS AS NEEDED FOR SHORTNESS OF BREATH/DYSPNEA OR WHEEZING 825 mL 1     levothyroxine (SYNTHROID/LEVOTHROID) 88 MCG tablet TAKE 1 TABLET(88 MCG) BY MOUTH DAILY 90 tablet 0     lisinopril (PRINIVIL/ZESTRIL) 5 MG tablet Take 1 tablet (5 mg) by mouth daily 30 tablet 0     metFORMIN (GLUCOPHAGE-XR) 500 MG 24 hr tablet TAKE 1 TABLET BY MOUTH TWICE DAILY WITH MEALS 180 tablet 0     methadone (DOLOPHINE) 10 MG tablet Take 2 pills  tablet 0     MIRALAX OR 1 capful daily prn       MULTIVITAMIN OR 1 a day       nitroFURantoin macrocrystal-monohydrate (MACROBID) 100 MG capsule Take 1 capsule (100 mg) by mouth 2 times daily 6 capsule 0     nystatin (MYCOSTATIN) 683931 UNIT/GM POWD Apply topically 3 times daily as needed 30 g 1     ondansetron (ZOFRAN ODT) 4 MG ODT tab Take 1-2 tablets (4-8 mg) by mouth every 8 hours as needed for nausea 20 tablet 1     order for DME Equipment being ordered: Nebulizer 1 each 0     order for DME Equipment being ordered: BIPAP.   15/5, Rate of 12, 2L O2 to keep sats 90-95%. 1 each 0     order for DME Equipment being ordered: Nebulizer 1 Package 0     order for DME Equipment being ordered: Hospital Bed, total electric (head, foot, and height adjustments), with any type side rails, with mattress 1 each 0     order for DME Equipment being ordered: Motorized Wheelchair 1 each 0     order for DME Equipment being ordered: Trapeze bar for hospital bed 1 each 0     oxybutynin ER (DITROPAN XL) 15 MG 24 hr tablet Take 1 tablet (15 mg) by mouth daily 90 tablet 3     Phenazopyridine HCl (PYRIDIUM  PO) Take 2 tablets by mouth 3 times daily as needed Dose unknown       senna-docusate (SENOKOT-S;PERICOLACE) 8.6-50 MG per tablet Take 2 tablets by mouth 2 times daily 100 tablet      senna-docusate (SENOKOT-S;PERICOLACE) 8.6-50 MG per tablet Take 2 tablets by mouth 2 times daily To prevent constipation while taking narcotic pain medication. Start with 1 tablet twice daily. If no bowel movement in 24 hours, increase to 2 tablets twice daily.  Discontinue if you have loose stools or when you are no longer taking narcotics. 30 tablet 0     simvastatin (ZOCOR) 20 MG tablet TAKE 1 TABLET(20 MG) BY MOUTH AT BEDTIME 90 tablet 3     tiZANidine (ZANAFLEX) 4 MG tablet Take 1 tablet (4 mg) by mouth 3 times daily 90 tablet      VENTOLIN  (90 Base) MCG/ACT inhaler INHALE 2 PUFFS INTO THE LUNGS EVERY 6 HOURS AS NEEDED FOR SHORTNESS OF BREATH 18 g 0     VENTOLIN  (90 Base) MCG/ACT inhaler INHALE 2 PUFFS INTO THE LUNGS EVERY 6 HOURS AS NEEDED FOR SHORTNESS OF BREATH 18 g 0     No current Epic-ordered facility-administered medications on file.        PHYSICAL EXAM:    Vitals: T Data Unavailable, P Data Unavailable, BP Data Unavailable, R Data Unavailable, SpO2  , Weight   Wt Readings from Last 2 Encounters:   10/28/19 121.7 kg (268 lb 3.2 oz)   08/29/19 117.5 kg (259 lb)       See doc flowsheet    NPO STATUS: see doc flowsheet    LABS:    BMP:  Recent Labs   Lab Test 05/21/19  1121      POTASSIUM 4.5   CHLORIDE 99   CO2 30   BUN 19   CR 0.64   *   DORY 10.5*       LFTs:   Recent Labs   Lab Test 05/21/19  1121   PROTTOTAL 7.6   ALBUMIN 3.8   BILITOTAL 0.4   ALKPHOS 90   AST 31   ALT 40       CBC:   Recent Labs   Lab Test 04/26/19  1540   WBC 13.2*   RBC 6.00*   HGB 17.1*   HCT 52.8*   MCV 88   MCH 28.5   MCHC 32.4   RDW 13.0          Coags:  Recent Labs   Lab Test 07/26/17  1532 04/18/16  1528   INR 0.97 1.00   PTT  --  25       Imaging:  No orders to display       Gerryelly Bahgaard MD  Anesthesiology  Staff  Pager [phone]    10/29/2019  12:09 PM

## 2019-10-30 ENCOUNTER — TELEPHONE (OUTPATIENT)
Dept: FAMILY MEDICINE | Facility: CLINIC | Age: 64
End: 2019-10-30

## 2019-10-30 DIAGNOSIS — J45.41 MODERATE PERSISTENT ASTHMA WITH ACUTE EXACERBATION: ICD-10-CM

## 2019-10-30 NOTE — TELEPHONE ENCOUNTER
Patient returned call and message was given to her. She declined to make a appointment at this time. Yani Waller

## 2019-10-30 NOTE — TELEPHONE ENCOUNTER
Panel Management Review      Patient has the following on her problem list:     Diabetes    ASA: Failed    Last A1C  Lab Results   Component Value Date    A1C 7.3 10/28/2019    A1C 7.2 05/21/2019    A1C 6.4 01/29/2019    A1C 6.0 08/15/2018    A1C 6.8 02/15/2018     A1C tested: Passed    Last LDL:    Lab Results   Component Value Date    CHOL 201 05/21/2019     Lab Results   Component Value Date    HDL 60 05/21/2019     Lab Results   Component Value Date    LDL 96 05/21/2019     Lab Results   Component Value Date    TRIG 225 05/21/2019     Lab Results   Component Value Date    CHOLHDLRATIO 2.3 12/12/2014     Lab Results   Component Value Date    NHDL 141 05/21/2019       Is the patient on a Statin? YES             Is the patient on Aspirin? YES    Medications     HMG CoA Reductase Inhibitors     simvastatin (ZOCOR) 20 MG tablet       Salicylates     aspirin 81 MG tablet             Last three blood pressure readings:  BP Readings from Last 3 Encounters:   10/28/19 (!) 142/84   08/29/19 126/78   04/26/19 122/83       Date of last diabetes office visit: 10/28/2019     Tobacco History:     History   Smoking Status     Never Smoker   Smokeless Tobacco     Never Used           Composite cancer screening  Chart review shows that this patient is due/due soon for the following Mammogram  Summary:    Patient is due/failing the following:   MAMMOGRAM    Action needed:   Patient needs office visit for Artem. Remind to  schedule 1 month follow up for med     Type of outreach:    Phone, left message for patient to call back.     Questions for provider review:    None                                                                                                                                    Analisa Mata       Chart routed to Care Team .

## 2019-10-31 RX ORDER — ALBUTEROL SULFATE 90 UG/1
AEROSOL, METERED RESPIRATORY (INHALATION)
Qty: 18 G | Refills: 0 | Status: SHIPPED | OUTPATIENT
Start: 2019-10-31 | End: 2019-11-30

## 2019-10-31 NOTE — TELEPHONE ENCOUNTER
Routing refill request to provider for review/approval because:  Brittney given x1 and patient did not follow up, please advise  Past due for asthma check up  ACT needs to be updated    Meghan Donald RN  [facility]/ [facility]

## 2019-10-31 NOTE — TELEPHONE ENCOUNTER
"Requested Prescriptions   Pending Prescriptions Disp Refills     VENTOLIN  (90 Base) MCG/ACT inhaler [Pharmacy Med Name: VENTOLIN HFA INH W/DOS CTR 200PUFFS] 18 g 0     Sig: INHALE 2 PUFFS INTO THE LUNGS EVERY 6 HOURS AS NEEDED FOR SHORTNESS OF BREATH       Last Written Prescription Date:9/24/19  Last Fill Quantity: 18g,  # refills: 0   Last office visit: 10/28/2019 with prescribing provider:  Luis Manuel Lopez   Future Office Visit:        Asthma Maintenance Inhalers - Anticholinergics Failed - 10/30/2019  6:58 PM        Failed - Asthma control assessment score within normal limits in last 6 months     Please review ACT score.       ACT Total Scores 2/15/2018 8/15/2018 3/11/2019   ACT TOTAL SCORE - - -   ASTHMA ER VISITS - - -   ASTHMA HOSPITALIZATIONS - - -   ACT TOTAL SCORE (Goal Greater than or Equal to 20) 15 20 16   In the past 12 months, how many times did you visit the emergency room for your asthma without being admitted to the hospital? 0 0 0   In the past 12 months, how many times were you hospitalized overnight because of your asthma? 0 0 0           Passed - Patient is age 12 years or older        Passed - Medication is active on med list        Passed - Recent (6 mo) or future (30 days) visit within the authorizing provider's specialty     Patient had office visit in the last 6 months or has a visit in the next 30 days with authorizing provider or within the authorizing provider's specialty.  See \"Patient Info\" tab in inbasket, or \"Choose Columns\" in Meds & Orders section of the refill encounter.              "

## 2019-11-01 ENCOUNTER — ANESTHESIA (OUTPATIENT)
Dept: SURGERY | Facility: AMBULATORY SURGERY CENTER | Age: 64
End: 2019-11-01

## 2019-11-01 ENCOUNTER — HOSPITAL ENCOUNTER (OUTPATIENT)
Facility: AMBULATORY SURGERY CENTER | Age: 64
End: 2019-11-01
Attending: UROLOGY
Payer: MEDICARE

## 2019-11-01 VITALS
HEART RATE: 82 BPM | DIASTOLIC BLOOD PRESSURE: 54 MMHG | OXYGEN SATURATION: 95 % | BODY MASS INDEX: 41.65 KG/M2 | RESPIRATION RATE: 16 BRPM | TEMPERATURE: 98 F | SYSTOLIC BLOOD PRESSURE: 98 MMHG | WEIGHT: 250 LBS | HEIGHT: 65 IN

## 2019-11-01 DIAGNOSIS — N31.9 NEUROGENIC BLADDER: ICD-10-CM

## 2019-11-01 LAB
GLUCOSE BLDC GLUCOMTR-MCNC: 122 MG/DL (ref 70–99)
GLUCOSE BLDC GLUCOMTR-MCNC: 127 MG/DL (ref 70–99)

## 2019-11-01 RX ORDER — ALBUTEROL SULFATE 90 UG/1
2 AEROSOL, METERED RESPIRATORY (INHALATION) EVERY 6 HOURS PRN
Status: DISCONTINUED | OUTPATIENT
Start: 2019-11-01 | End: 2019-11-01 | Stop reason: HOSPADM

## 2019-11-01 RX ORDER — MEPERIDINE HYDROCHLORIDE 25 MG/ML
12.5 INJECTION INTRAMUSCULAR; INTRAVENOUS; SUBCUTANEOUS
Status: DISCONTINUED | OUTPATIENT
Start: 2019-11-01 | End: 2019-11-02 | Stop reason: HOSPADM

## 2019-11-01 RX ORDER — SODIUM CHLORIDE, SODIUM LACTATE, POTASSIUM CHLORIDE, CALCIUM CHLORIDE 600; 310; 30; 20 MG/100ML; MG/100ML; MG/100ML; MG/100ML
INJECTION, SOLUTION INTRAVENOUS CONTINUOUS
Status: DISCONTINUED | OUTPATIENT
Start: 2019-11-01 | End: 2019-11-02 | Stop reason: HOSPADM

## 2019-11-01 RX ORDER — ONDANSETRON 2 MG/ML
INJECTION INTRAMUSCULAR; INTRAVENOUS PRN
Status: DISCONTINUED | OUTPATIENT
Start: 2019-11-01 | End: 2019-11-01

## 2019-11-01 RX ORDER — PROPOFOL 10 MG/ML
INJECTION, EMULSION INTRAVENOUS CONTINUOUS PRN
Status: DISCONTINUED | OUTPATIENT
Start: 2019-11-01 | End: 2019-11-01

## 2019-11-01 RX ORDER — ONDANSETRON 2 MG/ML
4 INJECTION INTRAMUSCULAR; INTRAVENOUS EVERY 30 MIN PRN
Status: DISCONTINUED | OUTPATIENT
Start: 2019-11-01 | End: 2019-11-02 | Stop reason: HOSPADM

## 2019-11-01 RX ORDER — LIDOCAINE 40 MG/G
CREAM TOPICAL
Status: DISCONTINUED | OUTPATIENT
Start: 2019-11-01 | End: 2019-11-01 | Stop reason: HOSPADM

## 2019-11-01 RX ORDER — NALOXONE HYDROCHLORIDE 0.4 MG/ML
.1-.4 INJECTION, SOLUTION INTRAMUSCULAR; INTRAVENOUS; SUBCUTANEOUS
Status: DISCONTINUED | OUTPATIENT
Start: 2019-11-01 | End: 2019-11-02 | Stop reason: HOSPADM

## 2019-11-01 RX ORDER — ACETAMINOPHEN 325 MG/1
975 TABLET ORAL ONCE
Status: COMPLETED | OUTPATIENT
Start: 2019-11-01 | End: 2019-11-01

## 2019-11-01 RX ORDER — DEXAMETHASONE SODIUM PHOSPHATE 4 MG/ML
INJECTION, SOLUTION INTRA-ARTICULAR; INTRALESIONAL; INTRAMUSCULAR; INTRAVENOUS; SOFT TISSUE PRN
Status: DISCONTINUED | OUTPATIENT
Start: 2019-11-01 | End: 2019-11-01

## 2019-11-01 RX ORDER — ONDANSETRON 4 MG/1
4 TABLET, ORALLY DISINTEGRATING ORAL EVERY 30 MIN PRN
Status: DISCONTINUED | OUTPATIENT
Start: 2019-11-01 | End: 2019-11-02 | Stop reason: HOSPADM

## 2019-11-01 RX ORDER — SODIUM CHLORIDE, SODIUM LACTATE, POTASSIUM CHLORIDE, CALCIUM CHLORIDE 600; 310; 30; 20 MG/100ML; MG/100ML; MG/100ML; MG/100ML
INJECTION, SOLUTION INTRAVENOUS CONTINUOUS
Status: DISCONTINUED | OUTPATIENT
Start: 2019-11-01 | End: 2019-11-01 | Stop reason: HOSPADM

## 2019-11-01 RX ORDER — CEFAZOLIN SODIUM 2 G/50ML
2 SOLUTION INTRAVENOUS
Status: COMPLETED | OUTPATIENT
Start: 2019-11-01 | End: 2019-11-01

## 2019-11-01 RX ORDER — GABAPENTIN 300 MG/1
300 CAPSULE ORAL ONCE
Status: DISCONTINUED | OUTPATIENT
Start: 2019-11-01 | End: 2019-11-01 | Stop reason: HOSPADM

## 2019-11-01 RX ORDER — PROPOFOL 10 MG/ML
INJECTION, EMULSION INTRAVENOUS PRN
Status: DISCONTINUED | OUTPATIENT
Start: 2019-11-01 | End: 2019-11-01

## 2019-11-01 RX ORDER — CEFAZOLIN SODIUM 1 G/50ML
1 SOLUTION INTRAVENOUS SEE ADMIN INSTRUCTIONS
Status: DISCONTINUED | OUTPATIENT
Start: 2019-11-01 | End: 2019-11-01 | Stop reason: HOSPADM

## 2019-11-01 RX ORDER — KETAMINE HYDROCHLORIDE 10 MG/ML
INJECTION, SOLUTION INTRAMUSCULAR; INTRAVENOUS PRN
Status: DISCONTINUED | OUTPATIENT
Start: 2019-11-01 | End: 2019-11-01

## 2019-11-01 RX ADMIN — DEXAMETHASONE SODIUM PHOSPHATE 4 MG: 4 INJECTION, SOLUTION INTRA-ARTICULAR; INTRALESIONAL; INTRAMUSCULAR; INTRAVENOUS; SOFT TISSUE at 12:15

## 2019-11-01 RX ADMIN — PROPOFOL 50 MCG/KG/MIN: 10 INJECTION, EMULSION INTRAVENOUS at 12:08

## 2019-11-01 RX ADMIN — ALBUTEROL SULFATE 2 PUFF: 90 INHALANT RESPIRATORY (INHALATION) at 10:49

## 2019-11-01 RX ADMIN — CEFAZOLIN SODIUM 2 G: 2 SOLUTION INTRAVENOUS at 12:07

## 2019-11-01 RX ADMIN — KETAMINE HYDROCHLORIDE 20 MG: 10 INJECTION, SOLUTION INTRAMUSCULAR; INTRAVENOUS at 12:02

## 2019-11-01 RX ADMIN — PROPOFOL 30 MG: 10 INJECTION, EMULSION INTRAVENOUS at 12:05

## 2019-11-01 RX ADMIN — ACETAMINOPHEN 975 MG: 325 TABLET ORAL at 10:12

## 2019-11-01 RX ADMIN — SODIUM CHLORIDE, SODIUM LACTATE, POTASSIUM CHLORIDE, CALCIUM CHLORIDE: 600; 310; 30; 20 INJECTION, SOLUTION INTRAVENOUS at 11:28

## 2019-11-01 RX ADMIN — ONDANSETRON 4 MG: 2 INJECTION INTRAMUSCULAR; INTRAVENOUS at 12:15

## 2019-11-01 ASSESSMENT — MIFFLIN-ST. JEOR: SCORE: 1686.93

## 2019-11-01 NOTE — ANESTHESIA CARE TRANSFER NOTE
Patient: Bhavani White    Procedure(s):  Cystoscopy, Bladder Botox Injection, Suprapubic Tube Change    Diagnosis: Neurogenic Bladder  Diagnosis Additional Information: No value filed.    Anesthesia Type:   MAC     Note:  Airway :Room Air  Patient transferred to:Phase II  Comments: Yokasta Report: Identifed the Patient, Identified the Reponsible Provider, Reviewed the pertinent medical history, Discussed the surgical course, Reviewed Intra-OP anesthesia mangement and issues during anesthesia, Set expectations for post-procedure period and Allowed opportunity for questions and acknowledgement of understanding      Vitals: (Last set prior to Anesthesia Care Transfer)    CRNA VITALS  11/1/2019 1156 - 11/1/2019 1234      11/1/2019             Pulse:  69    Ht Rate:  67    SpO2:  97 %    Resp Rate (set):  10                Electronically Signed By: ELISSA Alfred CRNA  November 1, 2019  12:34 PM

## 2019-11-01 NOTE — DISCHARGE INSTRUCTIONS
[facility]  Home Care Following Anesthesia  For 24 hours after surgery:  1. Get plenty of rest.  A responsible adult must stay with you for at least 24 hours after you leave the surgery center.  2. Do not drive or use heavy equipment.  If you have weakness or tingling, don't drive or use heavy equipment until this feeling goes away.   3. Do not drink alcohol.   4. Avoid strenuous or risky activities.  Ask for help when climbing stairs.  5. You may feel lightheaded.  IF so, sit for a few minutes before standing.  Have someone help you get up.   6. If you have nausea (feel sick to your stomach): Drink only clear liquids such as apple juice, ginger ale, broth or 7-Up.  Rest may also help.  Be sure to drink enough fluids.  Move to a regular diet as you feel able.   7. You may have a slight fever.  Call the doctor if your fever is over 100 F (37.7 C) (taken under the tongue) or lasts longer than 24 hours.  8. You may have a dry mouth, a sore throat, muscle aches or trouble sleeping. These should go away after 24 hours.  9. Do not make important or legal decisions.               Tips for taking pain medications  To get the best pain relief possible, remember these points:    Take pain medications as directed, before pain becomes severe.    Pain medication can upset your stomach: taking it with food may help.    Constipation is a common side effect of pain medication. Drink plenty of  fluids.    Eat foods high in fiber. Take a stool softener if recommended by your doctor or pharmacist.    Do not drink alcohol, drive or operate machinery while taking pain medications.    Ask about other ways to control pain, such as with heat, ice or relaxation.    Tylenol/Acetaminophen Consumption  To help encourage the safe use of acetaminophen, the makers of TYLENOL  have lowered the maximum daily dose for single-ingredient Extra Strength TYLENOL  (acetaminophen) products sold in the [place] from 8  pills per day (4,000 mg) to 6 pills per day (3,000 mg). The dosing interval has also changed from 2 pills every 4-6 hours to 2 pills every 6 hours.    If you feel your pain relief is insufficient, you may take Tylenol/Acetaminophen in addition to your narcotic pain medication.     Be careful not to exceed 3,000 mg of Tylenol/Acetaminophen in a 24 hour period from all sources.    If you are taking extra strength Tylenol/acetaminophen (500 mg), the maximum dose is 6 tablets in 24 hours.    If you are taking regular strength acetaminophen (325 mg), the maximum dose is 9 tablets in 24 hours.    Call a doctor for any of the followin. Signs of infection (fever, growing tenderness at the surgery site, a large amount of drainage or bleeding, severe pain, foul-smelling drainage, redness, swelling).  2. It has been over 8 to 10 hours since surgery and you are still not able to urinate (pass water).  3. Headache for over 24 hours.  4. Numbness, tingling or weakness the day after surgery (if you had spinal anesthesia).  Your doctor is:  Dr. Yoandy Lisa, Prostate and Urology: [phone]                    Or dial [phone] and ask for the resident on call for:  Prostate Urology  For emergency care, call the:  [facility]:  [phone] (TTY for hearing impaired: [phone])

## 2019-11-01 NOTE — OP NOTE
Urology Operative Summary    Pre-operative diagnosis: neurogenic bladder   Post-operative diagnosis: Neurogenic bladder   Procedure: Cystourethroscopy with injection of botulinum toxin A - 200 units  Suprapubic tube exchange     Surgeon: Yoandy Rios MD/MS   Assistant(s): Thu Babb MD, Khurram Villarreal MD      Anesthesia: MAC       Estimated blood loss: Less than 10 ml     Complications: None   Condition: Patient taken to recovery in stable condition.     Indications: Bhavani White is a 63 year old female with neurogenic bladder refractory to anti-cholinergics due to spinal stenosis. She understands the risks and benefits of the various options and elects to proceed with botulinum toxin injection understanding the risks for urinary obstruction, infection, pain, bleeding, need for future procedures and risks of anesthesia.    Procedure: Bhavani White was taken to the operating room in her  usual state of health. She was positioned in Patient Positioning: Lithotomy. Her genitalia were prepped and draped in the standard fashion. A time-out was performed to ensure proper patient, procedure and positioning. The patient received appropriate IV antibiotics prior to the procedure based on pre-operative urine culture.    The procedure was initiated with insertion of a rigid cystoscope. The bladder mucosa appeared to be normal without stones, tumors or diverticulum on 360 degree inspection. Cysto findings included:normal mucosa. We mixed 2 vials of 100 U botulinum toxin A into 20 cc's of injectable saline for a total of 200 U.    Bladder injection: We performed a template injection procedure into the bladder wall with a total of 20 injection sites. We changed her suprapubic tube and inflated the balloon under direct vision.    We then emptied the bladder to re-inspect our injection sites and found that there was a minimal amount of blood. The patient was returned to supine position and transported to recovery  in stable condition.    Plan: Schedule another Botox injection for 6 months.      Thu Babb MD  November 1, 2019

## 2019-11-01 NOTE — ANESTHESIA POSTPROCEDURE EVALUATION
Anesthesia POST Procedure Evaluation    Patient: Bhavani White   MRN:     [id number] Gender:   female   Age:    63 year old :      1955        Preoperative Diagnosis: Neurogenic Bladder   Procedure(s):  Cystoscopy, Bladder Botox Injection, Suprapubic Tube Change   Postop Comments: No value filed.       Anesthesia Type:  Not documented  MAC    Reportable Event: NO     PAIN: Uncomplicated   Sign Out status: Comfortable, Well controlled pain     PONV: No PONV   Sign Out status:  No Nausea or Vomiting     Neuro/Psych: Uneventful perioperative course   Sign Out Status: Preoperative baseline; Age appropriate mentation     Airway/Resp.: Uneventful perioperative course   Sign Out Status: Non labored breathing, age appropriate RR; Resp. Status within EXPECTED Parameters     CV: Uneventful perioperative course   Sign Out status: Appropriate BP and perfusion indices; Appropriate HR/Rhythm     Disposition:   Sign Out in:  Phase II  Disposition:  Home  Recovery Course: Uneventful  Follow-Up: Not required           Last Anesthesia Record Vitals:  [facility] VITALS  2019 1156 - 2019 1256      2019             Pulse:  69    Ht Rate:  67    SpO2:  97 %    Resp Rate (set):  10          Last PACU Vitals:  Vitals Value Taken Time   BP     Temp     Pulse     Resp     SpO2     Temp src     NIBP 96/66 2019 12:25 PM   Pulse 69 2019 12:27 PM   SpO2 97 % 2019 12:27 PM   Resp     Temp     Ht Rate 67 2019 12:27 PM   Temp 2           Electronically Signed By: Gerry Dillon MD, 2019

## 2019-11-02 LAB
BACTERIA SPEC CULT: ABNORMAL
SPECIMEN SOURCE: ABNORMAL

## 2019-11-08 DIAGNOSIS — E11.21 TYPE 2 DIABETES MELLITUS WITH DIABETIC NEPHROPATHY (H): ICD-10-CM

## 2019-11-08 RX ORDER — METFORMIN HCL 500 MG
TABLET, EXTENDED RELEASE 24 HR ORAL
Qty: 60 TABLET | Refills: 0 | OUTPATIENT
Start: 2019-11-08

## 2019-11-08 NOTE — TELEPHONE ENCOUNTER
Pending Prescriptions:                       Disp   Refills    metFORMIN (GLUCOPHAGE-XR) 500 MG 24 hr ta*60 tab*0            Sig: TAKE 1 TABLET BY MOUTH TWICE DAILY WITH MEALS    Sent 10/28/2019 with 3 month supply. Refill not appropriate at this time.     Arianna Haro, RN, BSN

## 2019-11-15 ENCOUNTER — TELEPHONE (OUTPATIENT)
Dept: UROLOGY | Facility: CLINIC | Age: 64
End: 2019-11-15

## 2019-11-15 NOTE — TELEPHONE ENCOUNTER
Gave verbal consent to [facility] for monthly catheter changes along with PRN catheter changes.    Jaki Dimas RN, BSN  Care Coordinator- Reconstructive Urology

## 2019-11-15 NOTE — TELEPHONE ENCOUNTER
[facility] Call Center    Phone Message    May a detailed message be left on voicemail: yes    Reason for Call: Order(s): Home Care Orders: Skilled Nursinx a month for 2 months, 2 prn, for catheter changes. Please call [facility] at [phone]   And you can leave a confidential mssg. Thanks.    Action Taken: Message routed to:  [facility] ([facility]): faye uro

## 2019-11-25 DIAGNOSIS — I10 HYPERTENSION GOAL BP (BLOOD PRESSURE) < 140/90: ICD-10-CM

## 2019-11-25 RX ORDER — LISINOPRIL 5 MG/1
TABLET ORAL
Qty: 30 TABLET | Refills: 5 | Status: ON HOLD | OUTPATIENT
Start: 2019-11-25 | End: 2020-01-21

## 2019-11-25 NOTE — TELEPHONE ENCOUNTER
"Requested Prescriptions   Pending Prescriptions Disp Refills     lisinopril (PRINIVIL/ZESTRIL) 5 MG tablet [Pharmacy Med Name: LISINOPRIL 5MG TABLETS] 30 tablet 0     Sig: TAKE 1 TABLET(5 MG) BY MOUTH DAILY       ACE Inhibitors (Including Combos) Protocol Passed - 11/25/2019  3:49 AM        Passed - Blood pressure under 140/90 in past 12 months     BP Readings from Last 3 Encounters:   11/01/19 98/54   10/28/19 (!) 142/84   08/29/19 126/78                 Passed - Recent (12 mo) or future (30 days) visit within the authorizing provider's specialty     Patient has had an office visit with the authorizing provider or a provider within the authorizing providers department within the previous 12 mos or has a future within next 30 days. See \"Patient Info\" tab in inbasket, or \"Choose Columns\" in Meds & Orders section of the refill encounter.              Passed - Medication is active on med list        Passed - Patient is age 18 or older        Passed - No active pregnancy on record        Passed - Normal serum creatinine on file in past 12 months     Recent Labs   Lab Test 05/21/19  1121  06/28/17  1446   CR 0.64   < >  --    CREAT  --   --  0.9    < > = values in this interval not displayed.             Passed - Normal serum potassium on file in past 12 months     Recent Labs   Lab Test 05/21/19  1121   POTASSIUM 4.5             Passed - No positive pregnancy test within past 12 months          "

## 2019-11-27 ENCOUNTER — MYC MEDICAL ADVICE (OUTPATIENT)
Dept: UROLOGY | Facility: CLINIC | Age: 64
End: 2019-11-27

## 2019-11-30 DIAGNOSIS — J45.41 MODERATE PERSISTENT ASTHMA WITH ACUTE EXACERBATION: ICD-10-CM

## 2019-12-02 NOTE — TELEPHONE ENCOUNTER
"Requested Prescriptions   Pending Prescriptions Disp Refills     VENTOLIN  (90 Base) MCG/ACT inhaler [Pharmacy Med Name: VENTOLIN HFA INH W/DOS CTR 200PUFFS] 18 g 0     Sig: INHALE 2 PUFFS INTO THE LUNGS EVERY 6 HOURS AS NEEDED FOR SHORTNESS OF BREATH       Asthma Maintenance Inhalers - Anticholinergics Failed - 11/30/2019 12:57 PM        Failed - Asthma control assessment score within normal limits in last 6 months     Please review ACT score.   ACT Total Scores 2/15/2018 8/15/2018 3/11/2019   ACT TOTAL SCORE - - -   ASTHMA ER VISITS - - -   ASTHMA HOSPITALIZATIONS - - -   ACT TOTAL SCORE (Goal Greater than or Equal to 20) 15 20 16   In the past 12 months, how many times did you visit the emergency room for your asthma without being admitted to the hospital? 0 0 0   In the past 12 months, how many times were you hospitalized overnight because of your asthma? 0 0 0           Passed - Patient is age 12 years or older        Passed - Medication is active on med list        Passed - Recent (6 mo) or future (30 days) visit within the authorizing provider's specialty     Patient had office visit in the last 6 months or has a visit in the next 30 days with authorizing provider or within the authorizing provider's specialty.  See \"Patient Info\" tab in inbasket, or \"Choose Columns\" in Meds & Orders section of the refill encounter.            Last Written Prescription Date:  8/9/2019  Last Fill Quantity: 18g,  # refills: 0   Last office visit: 10/28/2019 with prescribing provider:  Dr. Moore  Future Office Visit:      Routing refill request to provider for review/approval because:  Patient needs to be seen because:  Due for Asthma recheck.      Brittney given x1 and patient did not follow up, please advise          "

## 2019-12-03 RX ORDER — ALBUTEROL SULFATE 90 UG/1
AEROSOL, METERED RESPIRATORY (INHALATION)
Qty: 18 G | Refills: 0 | Status: SHIPPED | OUTPATIENT
Start: 2019-12-03 | End: 2021-03-16

## 2019-12-05 DIAGNOSIS — R82.90 FOUL SMELLING URINE: Primary | ICD-10-CM

## 2019-12-05 LAB
ALBUMIN UR-MCNC: ABNORMAL MG/DL
AMORPH CRY #/AREA URNS HPF: ABNORMAL /HPF
APPEARANCE UR: ABNORMAL
BACTERIA #/AREA URNS HPF: ABNORMAL /HPF
BILIRUB UR QL STRIP: ABNORMAL
COLOR UR AUTO: ABNORMAL
GLUCOSE UR STRIP-MCNC: ABNORMAL MG/DL
HGB UR QL STRIP: ABNORMAL
KETONES UR STRIP-MCNC: ABNORMAL MG/DL
LEUKOCYTE ESTERASE UR QL STRIP: ABNORMAL
MUCOUS THREADS #/AREA URNS LPF: PRESENT /LPF
NITRATE UR QL: ABNORMAL
PH UR STRIP: ABNORMAL PH (ref 5–7)
RBC #/AREA URNS AUTO: 7 /HPF (ref 0–2)
SOURCE: ABNORMAL
SP GR UR STRIP: ABNORMAL (ref 1–1.03)
UROBILINOGEN UR STRIP-MCNC: ABNORMAL MG/DL (ref 0–2)
WBC #/AREA URNS AUTO: 44 /HPF (ref 0–5)

## 2019-12-05 PROCEDURE — 87186 SC STD MICRODIL/AGAR DIL: CPT | Performed by: UROLOGY

## 2019-12-05 PROCEDURE — 87088 URINE BACTERIA CULTURE: CPT | Performed by: UROLOGY

## 2019-12-05 PROCEDURE — 87086 URINE CULTURE/COLONY COUNT: CPT | Performed by: UROLOGY

## 2019-12-05 PROCEDURE — 81001 URINALYSIS AUTO W/SCOPE: CPT | Performed by: UROLOGY

## 2019-12-06 ENCOUNTER — TELEPHONE (OUTPATIENT)
Dept: UROLOGY | Facility: CLINIC | Age: 64
End: 2019-12-06

## 2019-12-06 DIAGNOSIS — N39.0 URINARY TRACT INFECTION: Primary | ICD-10-CM

## 2019-12-06 RX ORDER — CIPROFLOXACIN 500 MG/1
500 TABLET, FILM COATED ORAL 2 TIMES DAILY
Qty: 10 TABLET | Refills: 0 | Status: SHIPPED | OUTPATIENT
Start: 2019-12-06 | End: 2019-12-29

## 2019-12-06 NOTE — TELEPHONE ENCOUNTER
[facility] Call Center    Phone Message    May a detailed message be left on voicemail: yes    Reason for Call: Pt called and stated that pt received results for the urianalysis and pt had an infection. Pt stated pt is confused on why pt has not received a call. Please call back pt asap to discuss the next steps. Thanks.      Action Taken: Message routed to:  [facility] ([facility] URO

## 2019-12-06 NOTE — TELEPHONE ENCOUNTER
Patient called cipro given thru steve check to see if right medication Karey Lucio LPN Staff Nurse

## 2019-12-09 NOTE — TELEPHONE ENCOUNTER
Patient called and message left   Positive culture and told we need to order sensitivities and await the final final result Karey Lucio, MAYRAN Staff Nurse

## 2019-12-13 LAB
BACTERIA SPEC CULT: ABNORMAL
Lab: ABNORMAL
SPECIMEN SOURCE: ABNORMAL

## 2019-12-17 DIAGNOSIS — N39.0 URINARY TRACT INFECTION: Primary | ICD-10-CM

## 2019-12-17 RX ORDER — NITROFURANTOIN 25; 75 MG/1; MG/1
100 CAPSULE ORAL 2 TIMES DAILY
Qty: 10 CAPSULE | Refills: 0 | Status: ON HOLD | OUTPATIENT
Start: 2019-12-17 | End: 2020-01-21

## 2019-12-17 RX ORDER — SULFAMETHOXAZOLE/TRIMETHOPRIM 800-160 MG
1 TABLET ORAL 2 TIMES DAILY
Qty: 10 TABLET | Refills: 0 | Status: ON HOLD | OUTPATIENT
Start: 2019-12-17 | End: 2020-01-21

## 2019-12-18 ENCOUNTER — DOCUMENTATION ONLY (OUTPATIENT)
Dept: CARE COORDINATION | Facility: CLINIC | Age: 64
End: 2019-12-18

## 2019-12-20 DIAGNOSIS — E78.5 HYPERLIPIDEMIA LDL GOAL <100: ICD-10-CM

## 2019-12-23 RX ORDER — SIMVASTATIN 20 MG
TABLET ORAL
Qty: 90 TABLET | Refills: 3 | Status: SHIPPED | OUTPATIENT
Start: 2019-12-23 | End: 2020-02-19

## 2019-12-23 NOTE — TELEPHONE ENCOUNTER
"Prescription approved per [facility] Refill Protocol.      Requested Prescriptions   Pending Prescriptions Disp Refills     simvastatin (ZOCOR) 20 MG tablet [Pharmacy Med Name: SIMVASTATIN 20MG TABLETS] 90 tablet 3     Sig: TAKE 1 TABLET(20 MG) BY MOUTH AT BEDTIME       Statins Protocol Passed - 12/20/2019  2:23 PM        Passed - LDL on file in past 12 months     Recent Labs   Lab Test 05/21/19  1121   LDL 96             Passed - No abnormal creatine kinase in past 12 months     No lab results found.             Passed - Recent (12 mo) or future (30 days) visit within the authorizing provider's specialty     Patient has had an office visit with the authorizing provider or a provider within the authorizing providers department within the previous 12 mos or has a future within next 30 days. See \"Patient Info\" tab in inbasket, or \"Choose Columns\" in Meds & Orders section of the refill encounter.              Passed - Medication is active on med list        Passed - Patient is age 18 or older        Passed - No active pregnancy on record        Passed - No positive pregnancy test in past 12 months        Marichuy Sheehan RN Flex    "

## 2019-12-29 ENCOUNTER — APPOINTMENT (OUTPATIENT)
Dept: CT IMAGING | Facility: CLINIC | Age: 64
DRG: 853 | End: 2019-12-29
Attending: EMERGENCY MEDICINE
Payer: MEDICARE

## 2019-12-29 ENCOUNTER — APPOINTMENT (OUTPATIENT)
Dept: GENERAL RADIOLOGY | Facility: CLINIC | Age: 64
DRG: 853 | End: 2019-12-29
Attending: EMERGENCY MEDICINE
Payer: MEDICARE

## 2019-12-29 ENCOUNTER — HOSPITAL ENCOUNTER (INPATIENT)
Facility: CLINIC | Age: 64
LOS: 23 days | Discharge: NURSING, REHAB OR LONG-TERM CARE | DRG: 853 | End: 2020-01-21
Attending: EMERGENCY MEDICINE | Admitting: SURGERY
Payer: MEDICARE

## 2019-12-29 ENCOUNTER — APPOINTMENT (OUTPATIENT)
Dept: CARDIOLOGY | Facility: CLINIC | Age: 64
DRG: 853 | End: 2019-12-29
Attending: INTERNAL MEDICINE
Payer: MEDICARE

## 2019-12-29 DIAGNOSIS — E66.01 MORBID OBESITY, UNSPECIFIED OBESITY TYPE (H): ICD-10-CM

## 2019-12-29 DIAGNOSIS — K26.5 DUODENAL ULCER PERFORATION (H): ICD-10-CM

## 2019-12-29 DIAGNOSIS — K59.03 DRUG-INDUCED CONSTIPATION: Primary | ICD-10-CM

## 2019-12-29 DIAGNOSIS — K26.5 PERFORATED DUODENAL ULCER (H): ICD-10-CM

## 2019-12-29 DIAGNOSIS — I21.4 NSTEMI (NON-ST ELEVATED MYOCARDIAL INFARCTION) (H): ICD-10-CM

## 2019-12-29 DIAGNOSIS — R40.2422 GLASGOW COMA SCALE TOTAL SCORE 9-12, AT ARRIVAL TO EMERGENCY DEPARTMENT: ICD-10-CM

## 2019-12-29 DIAGNOSIS — A41.9 BACTERIAL SEPSIS (H): ICD-10-CM

## 2019-12-29 DIAGNOSIS — M79.7 FIBROMYALGIA: ICD-10-CM

## 2019-12-29 DIAGNOSIS — E11.21 TYPE 2 DIABETES MELLITUS WITH DIABETIC NEPHROPATHY, UNSPECIFIED WHETHER LONG TERM INSULIN USE (H): ICD-10-CM

## 2019-12-29 DIAGNOSIS — I10 HYPERTENSION GOAL BP (BLOOD PRESSURE) < 140/90: ICD-10-CM

## 2019-12-29 LAB
ABO + RH BLD: NORMAL
ABO + RH BLD: NORMAL
ALBUMIN SERPL-MCNC: 2.5 G/DL (ref 3.4–5)
ALBUMIN SERPL-MCNC: 2.9 G/DL (ref 3.4–5)
ALBUMIN UR-MCNC: 300 MG/DL
ALP SERPL-CCNC: 108 U/L (ref 40–150)
ALP SERPL-CCNC: 97 U/L (ref 40–150)
ALT SERPL W P-5'-P-CCNC: 738 U/L (ref 0–50)
ALT SERPL W P-5'-P-CCNC: 743 U/L (ref 0–50)
AMPHETAMINES UR QL SCN: NEGATIVE
ANION GAP SERPL CALCULATED.3IONS-SCNC: 9 MMOL/L (ref 3–14)
ANION GAP SERPL CALCULATED.3IONS-SCNC: 9 MMOL/L (ref 3–14)
APPEARANCE UR: ABNORMAL
APTT PPP: 28 SEC (ref 22–37)
APTT PPP: NORMAL SEC (ref 22–37)
AST SERPL W P-5'-P-CCNC: 567 U/L (ref 0–45)
AST SERPL W P-5'-P-CCNC: 715 U/L (ref 0–45)
BACTERIA #/AREA URNS HPF: ABNORMAL /HPF
BARBITURATES UR QL: NEGATIVE
BASOPHILS # BLD AUTO: 0 10E9/L (ref 0–0.2)
BASOPHILS NFR BLD AUTO: 0 %
BENZODIAZ UR QL: NEGATIVE
BILIRUB SERPL-MCNC: 1 MG/DL (ref 0.2–1.3)
BILIRUB SERPL-MCNC: 1.1 MG/DL (ref 0.2–1.3)
BILIRUB UR QL STRIP: NEGATIVE
BLD GP AB SCN SERPL QL: NORMAL
BLOOD BANK CMNT PATIENT-IMP: NORMAL
BUN SERPL-MCNC: 60 MG/DL (ref 7–30)
BUN SERPL-MCNC: 65 MG/DL (ref 7–30)
CA-I BLD-MCNC: 4.2 MG/DL (ref 4.4–5.2)
CALCIUM SERPL-MCNC: 8.6 MG/DL (ref 8.5–10.1)
CALCIUM SERPL-MCNC: 9.8 MG/DL (ref 8.5–10.1)
CANNABINOIDS UR QL SCN: POSITIVE
CHLORIDE SERPL-SCNC: 106 MMOL/L (ref 94–109)
CHLORIDE SERPL-SCNC: 109 MMOL/L (ref 94–109)
CO2 BLDCOV-SCNC: 30 MMOL/L (ref 21–28)
CO2 SERPL-SCNC: 28 MMOL/L (ref 20–32)
CO2 SERPL-SCNC: 28 MMOL/L (ref 20–32)
COCAINE UR QL: NEGATIVE
COLOR UR AUTO: YELLOW
CREAT BLD-MCNC: 1.6 MG/DL (ref 0.52–1.04)
CREAT SERPL-MCNC: 1.4 MG/DL (ref 0.52–1.04)
CREAT SERPL-MCNC: 1.56 MG/DL (ref 0.52–1.04)
DIFFERENTIAL METHOD BLD: ABNORMAL
EOSINOPHIL # BLD AUTO: 0 10E9/L (ref 0–0.7)
EOSINOPHIL NFR BLD AUTO: 0 %
ERYTHROCYTE [DISTWIDTH] IN BLOOD BY AUTOMATED COUNT: 15.8 % (ref 10–15)
ERYTHROCYTE [DISTWIDTH] IN BLOOD BY AUTOMATED COUNT: 18.3 % (ref 10–15)
FLUAV+FLUBV RNA SPEC QL NAA+PROBE: NEGATIVE
FLUAV+FLUBV RNA SPEC QL NAA+PROBE: NEGATIVE
GFR SERPL CREATININE-BSD FRML MDRD: 33 ML/MIN/{1.73_M2}
GFR SERPL CREATININE-BSD FRML MDRD: 34 ML/MIN/{1.73_M2}
GFR SERPL CREATININE-BSD FRML MDRD: 40 ML/MIN/{1.73_M2}
GLUCOSE BLDC GLUCOMTR-MCNC: 153 MG/DL (ref 70–99)
GLUCOSE BLDC GLUCOMTR-MCNC: 191 MG/DL (ref 70–99)
GLUCOSE SERPL-MCNC: 189 MG/DL (ref 70–99)
GLUCOSE SERPL-MCNC: 207 MG/DL (ref 70–99)
GLUCOSE UR STRIP-MCNC: NEGATIVE MG/DL
HCT VFR BLD AUTO: 55.2 % (ref 35–47)
HCT VFR BLD AUTO: 57.9 % (ref 35–47)
HGB BLD-MCNC: 17.8 G/DL (ref 11.7–15.7)
HGB BLD-MCNC: 19.2 G/DL (ref 11.7–15.7)
HGB UR QL STRIP: ABNORMAL
HYALINE CASTS #/AREA URNS LPF: 15 /LPF (ref 0–2)
IMM GRANULOCYTES # BLD: 0.1 10E9/L (ref 0–0.4)
IMM GRANULOCYTES NFR BLD: 0.6 %
INR PPP: 1.61 (ref 0.86–1.14)
INR PPP: NORMAL (ref 0.86–1.14)
INTERPRETATION ECG - MUSE: NORMAL
INTERPRETATION ECG - MUSE: NORMAL
KETONES UR STRIP-MCNC: 10 MG/DL
LACTATE BLD-SCNC: 1.6 MMOL/L (ref 0.7–2)
LACTATE BLD-SCNC: 3 MMOL/L (ref 0.7–2)
LACTATE BLD-SCNC: 4.1 MMOL/L (ref 0.7–2.1)
LEUKOCYTE ESTERASE UR QL STRIP: ABNORMAL
LYMPHOCYTES # BLD AUTO: 1.2 10E9/L (ref 0.8–5.3)
LYMPHOCYTES NFR BLD AUTO: 5.8 %
MAGNESIUM SERPL-MCNC: 2.1 MG/DL (ref 1.6–2.3)
MCH RBC QN AUTO: 27.5 PG (ref 26.5–33)
MCH RBC QN AUTO: 28.9 PG (ref 26.5–33)
MCHC RBC AUTO-ENTMCNC: 32.2 G/DL (ref 31.5–36.5)
MCHC RBC AUTO-ENTMCNC: 33.2 G/DL (ref 31.5–36.5)
MCV RBC AUTO: 85 FL (ref 78–100)
MCV RBC AUTO: 87 FL (ref 78–100)
MONOCYTES # BLD AUTO: 1.7 10E9/L (ref 0–1.3)
MONOCYTES NFR BLD AUTO: 8.2 %
MUCOUS THREADS #/AREA URNS LPF: PRESENT /LPF
NEUTROPHILS # BLD AUTO: 17.5 10E9/L (ref 1.6–8.3)
NEUTROPHILS NFR BLD AUTO: 85.4 %
NITRATE UR QL: NEGATIVE
NRBC # BLD AUTO: 0.1 10*3/UL
NRBC BLD AUTO-RTO: 0 /100
OPIATES UR QL SCN: NEGATIVE
PCO2 BLDV: 57 MM HG (ref 40–50)
PCP UR QL SCN: NEGATIVE
PH BLDV: 7.33 PH (ref 7.32–7.43)
PH UR STRIP: 5.5 PH (ref 5–7)
PHOSPHATE SERPL-MCNC: 2.4 MG/DL (ref 2.5–4.5)
PLATELET # BLD AUTO: 285 10E9/L (ref 150–450)
PLATELET # BLD AUTO: 289 10E9/L (ref 150–450)
PO2 BLDV: 46 MM HG (ref 25–47)
POTASSIUM SERPL-SCNC: 3.9 MMOL/L (ref 3.4–5.3)
POTASSIUM SERPL-SCNC: 4.2 MMOL/L (ref 3.4–5.3)
PROCALCITONIN SERPL-MCNC: 3.09 NG/ML
PROT SERPL-MCNC: 5.8 G/DL (ref 6.8–8.8)
PROT SERPL-MCNC: 6.8 G/DL (ref 6.8–8.8)
RBC # BLD AUTO: 6.47 10E12/L (ref 3.8–5.2)
RBC # BLD AUTO: 6.64 10E12/L (ref 3.8–5.2)
RBC #/AREA URNS AUTO: 3 /HPF (ref 0–2)
RSV RNA SPEC NAA+PROBE: NEGATIVE
SAO2 % BLDV FROM PO2: 77 %
SODIUM SERPL-SCNC: 143 MMOL/L (ref 133–144)
SODIUM SERPL-SCNC: 146 MMOL/L (ref 133–144)
SOURCE: ABNORMAL
SP GR UR STRIP: 1.03 (ref 1–1.03)
SPECIMEN EXP DATE BLD: NORMAL
SPECIMEN SOURCE: NORMAL
SQUAMOUS #/AREA URNS AUTO: 1 /HPF (ref 0–1)
TROPONIN I SERPL-MCNC: 12.38 UG/L (ref 0–0.04)
TROPONIN I SERPL-MCNC: 19.4 UG/L (ref 0–0.04)
UROBILINOGEN UR STRIP-MCNC: NORMAL MG/DL (ref 0–2)
WBC # BLD AUTO: 20.5 10E9/L (ref 4–11)
WBC # BLD AUTO: 23.5 10E9/L (ref 4–11)
WBC #/AREA URNS AUTO: 13 /HPF (ref 0–5)

## 2019-12-29 PROCEDURE — 86900 BLOOD TYPING SEROLOGIC ABO: CPT | Performed by: EMERGENCY MEDICINE

## 2019-12-29 PROCEDURE — 83735 ASSAY OF MAGNESIUM: CPT | Performed by: SURGERY

## 2019-12-29 PROCEDURE — 85025 COMPLETE CBC W/AUTO DIFF WBC: CPT | Performed by: EMERGENCY MEDICINE

## 2019-12-29 PROCEDURE — 81003 URINALYSIS AUTO W/O SCOPE: CPT | Performed by: EMERGENCY MEDICINE

## 2019-12-29 PROCEDURE — 27210131 ZZH KIT ART LINE INSERTION

## 2019-12-29 PROCEDURE — 82565 ASSAY OF CREATININE: CPT

## 2019-12-29 PROCEDURE — 0DNW0ZZ RELEASE PERITONEUM, OPEN APPROACH: ICD-10-PCS | Performed by: SURGERY

## 2019-12-29 PROCEDURE — 83605 ASSAY OF LACTIC ACID: CPT | Performed by: SURGERY

## 2019-12-29 PROCEDURE — 86901 BLOOD TYPING SEROLOGIC RH(D): CPT | Performed by: EMERGENCY MEDICINE

## 2019-12-29 PROCEDURE — 80307 DRUG TEST PRSMV CHEM ANLYZR: CPT | Performed by: INTERNAL MEDICINE

## 2019-12-29 PROCEDURE — 94003 VENT MGMT INPAT SUBQ DAY: CPT

## 2019-12-29 PROCEDURE — 40000275 ZZH STATISTIC RCP TIME EA 10 MIN

## 2019-12-29 PROCEDURE — 25000128 H RX IP 250 OP 636: Performed by: EMERGENCY MEDICINE

## 2019-12-29 PROCEDURE — 86900 BLOOD TYPING SEROLOGIC ABO: CPT | Performed by: INTERNAL MEDICINE

## 2019-12-29 PROCEDURE — 87040 BLOOD CULTURE FOR BACTERIA: CPT | Performed by: EMERGENCY MEDICINE

## 2019-12-29 PROCEDURE — 85730 THROMBOPLASTIN TIME PARTIAL: CPT | Performed by: EMERGENCY MEDICINE

## 2019-12-29 PROCEDURE — C9113 INJ PANTOPRAZOLE SODIUM, VIA: HCPCS | Performed by: INTERNAL MEDICINE

## 2019-12-29 PROCEDURE — 99285 EMERGENCY DEPT VISIT HI MDM: CPT | Mod: 25

## 2019-12-29 PROCEDURE — 82330 ASSAY OF CALCIUM: CPT | Performed by: SURGERY

## 2019-12-29 PROCEDURE — 87631 RESP VIRUS 3-5 TARGETS: CPT | Performed by: INTERNAL MEDICINE

## 2019-12-29 PROCEDURE — 83605 ASSAY OF LACTIC ACID: CPT

## 2019-12-29 PROCEDURE — 25000131 ZZH RX MED GY IP 250 OP 636 PS 637: Mod: GY | Performed by: INTERNAL MEDICINE

## 2019-12-29 PROCEDURE — 25000132 ZZH RX MED GY IP 250 OP 250 PS 637: Mod: GY | Performed by: INTERNAL MEDICINE

## 2019-12-29 PROCEDURE — 82803 BLOOD GASES ANY COMBINATION: CPT

## 2019-12-29 PROCEDURE — 96361 HYDRATE IV INFUSION ADD-ON: CPT

## 2019-12-29 PROCEDURE — 5A1955Z RESPIRATORY VENTILATION, GREATER THAN 96 CONSECUTIVE HOURS: ICD-10-PCS | Performed by: EMERGENCY MEDICINE

## 2019-12-29 PROCEDURE — 36415 COLL VENOUS BLD VENIPUNCTURE: CPT | Performed by: EMERGENCY MEDICINE

## 2019-12-29 PROCEDURE — 40000008 ZZH STATISTIC AIRWAY CARE

## 2019-12-29 PROCEDURE — 84145 PROCALCITONIN (PCT): CPT | Performed by: SURGERY

## 2019-12-29 PROCEDURE — 83605 ASSAY OF LACTIC ACID: CPT | Performed by: EMERGENCY MEDICINE

## 2019-12-29 PROCEDURE — 93005 ELECTROCARDIOGRAM TRACING: CPT | Mod: 76

## 2019-12-29 PROCEDURE — 25500064 ZZH RX 255 OP 636: Performed by: INTERNAL MEDICINE

## 2019-12-29 PROCEDURE — 94002 VENT MGMT INPAT INIT DAY: CPT

## 2019-12-29 PROCEDURE — 87077 CULTURE AEROBIC IDENTIFY: CPT | Performed by: EMERGENCY MEDICINE

## 2019-12-29 PROCEDURE — 31500 INSERT EMERGENCY AIRWAY: CPT

## 2019-12-29 PROCEDURE — 84484 ASSAY OF TROPONIN QUANT: CPT | Performed by: SURGERY

## 2019-12-29 PROCEDURE — 93005 ELECTROCARDIOGRAM TRACING: CPT

## 2019-12-29 PROCEDURE — 25000128 H RX IP 250 OP 636: Performed by: INTERNAL MEDICINE

## 2019-12-29 PROCEDURE — 25000125 ZZHC RX 250: Performed by: EMERGENCY MEDICINE

## 2019-12-29 PROCEDURE — 36556 INSERT NON-TUNNEL CV CATH: CPT

## 2019-12-29 PROCEDURE — 71275 CT ANGIOGRAPHY CHEST: CPT

## 2019-12-29 PROCEDURE — 93306 TTE W/DOPPLER COMPLETE: CPT | Mod: 26 | Performed by: INTERNAL MEDICINE

## 2019-12-29 PROCEDURE — 86850 RBC ANTIBODY SCREEN: CPT | Performed by: EMERGENCY MEDICINE

## 2019-12-29 PROCEDURE — 0DQ90ZZ REPAIR DUODENUM, OPEN APPROACH: ICD-10-PCS | Performed by: SURGERY

## 2019-12-29 PROCEDURE — 96365 THER/PROPH/DIAG IV INF INIT: CPT

## 2019-12-29 PROCEDURE — 99291 CRITICAL CARE FIRST HOUR: CPT | Performed by: SURGERY

## 2019-12-29 PROCEDURE — 00000146 ZZHCL STATISTIC GLUCOSE BY METER IP

## 2019-12-29 PROCEDURE — 93306 TTE W/DOPPLER COMPLETE: CPT

## 2019-12-29 PROCEDURE — 40000281 ZZH STATISTIC TRANSPORT TIME EA 15 MIN

## 2019-12-29 PROCEDURE — 25000128 H RX IP 250 OP 636

## 2019-12-29 PROCEDURE — 84100 ASSAY OF PHOSPHORUS: CPT | Performed by: SURGERY

## 2019-12-29 PROCEDURE — 84484 ASSAY OF TROPONIN QUANT: CPT | Performed by: EMERGENCY MEDICINE

## 2019-12-29 PROCEDURE — 99223 1ST HOSP IP/OBS HIGH 75: CPT | Mod: 25 | Performed by: INTERNAL MEDICINE

## 2019-12-29 PROCEDURE — 85610 PROTHROMBIN TIME: CPT | Performed by: EMERGENCY MEDICINE

## 2019-12-29 PROCEDURE — 87186 SC STD MICRODIL/AGAR DIL: CPT | Performed by: EMERGENCY MEDICINE

## 2019-12-29 PROCEDURE — 85027 COMPLETE CBC AUTOMATED: CPT | Performed by: SURGERY

## 2019-12-29 PROCEDURE — 70450 CT HEAD/BRAIN W/O DYE: CPT

## 2019-12-29 PROCEDURE — 25800030 ZZH RX IP 258 OP 636: Performed by: EMERGENCY MEDICINE

## 2019-12-29 PROCEDURE — 40000986 XR CHEST PORT 1 VW

## 2019-12-29 PROCEDURE — 87800 DETECT AGNT MULT DNA DIREC: CPT | Performed by: EMERGENCY MEDICINE

## 2019-12-29 PROCEDURE — XW043H4 INTRODUCTION OF SYNTHETIC HUMAN ANGIOTENSIN II INTO CENTRAL VEIN, PERCUTANEOUS APPROACH, NEW TECHNOLOGY GROUP 4: ICD-10-PCS | Performed by: EMERGENCY MEDICINE

## 2019-12-29 PROCEDURE — 81001 URINALYSIS AUTO W/SCOPE: CPT | Performed by: EMERGENCY MEDICINE

## 2019-12-29 PROCEDURE — 80053 COMPREHEN METABOLIC PANEL: CPT | Performed by: EMERGENCY MEDICINE

## 2019-12-29 PROCEDURE — 25000128 H RX IP 250 OP 636: Performed by: SURGERY

## 2019-12-29 PROCEDURE — 0DNW4ZZ RELEASE PERITONEUM, PERCUTANEOUS ENDOSCOPIC APPROACH: ICD-10-PCS | Performed by: SURGERY

## 2019-12-29 PROCEDURE — 20000003 ZZH R&B ICU

## 2019-12-29 PROCEDURE — 80053 COMPREHEN METABOLIC PANEL: CPT | Performed by: SURGERY

## 2019-12-29 PROCEDURE — 2W13X6Z COMPRESSION OF ABDOMINAL WALL USING PRESSURE DRESSING: ICD-10-PCS | Performed by: SURGERY

## 2019-12-29 PROCEDURE — 99222 1ST HOSP IP/OBS MODERATE 55: CPT | Performed by: SURGERY

## 2019-12-29 RX ORDER — NALOXONE HYDROCHLORIDE 0.4 MG/ML
.1-.4 INJECTION, SOLUTION INTRAMUSCULAR; INTRAVENOUS; SUBCUTANEOUS
Status: DISCONTINUED | OUTPATIENT
Start: 2019-12-29 | End: 2019-12-29

## 2019-12-29 RX ORDER — IOPAMIDOL 755 MG/ML
135 INJECTION, SOLUTION INTRAVASCULAR ONCE
Status: COMPLETED | OUTPATIENT
Start: 2019-12-29 | End: 2019-12-29

## 2019-12-29 RX ORDER — POTASSIUM CL/LIDO/0.9 % NACL 10MEQ/0.1L
10 INTRAVENOUS SOLUTION, PIGGYBACK (ML) INTRAVENOUS
Status: DISCONTINUED | OUTPATIENT
Start: 2019-12-29 | End: 2020-01-07

## 2019-12-29 RX ORDER — FENTANYL CITRATE 50 UG/ML
50-100 INJECTION, SOLUTION INTRAMUSCULAR; INTRAVENOUS
Status: DISCONTINUED | OUTPATIENT
Start: 2019-12-29 | End: 2020-01-07

## 2019-12-29 RX ORDER — OXYBUTYNIN CHLORIDE 15 MG/1
15 TABLET, EXTENDED RELEASE ORAL DAILY
Status: ON HOLD | COMMUNITY
End: 2020-01-21

## 2019-12-29 RX ORDER — HEPARIN SODIUM 10000 [USP'U]/100ML
1050 INJECTION, SOLUTION INTRAVENOUS CONTINUOUS
Status: DISCONTINUED | OUTPATIENT
Start: 2019-12-29 | End: 2019-12-30

## 2019-12-29 RX ORDER — PIPERACILLIN SODIUM, TAZOBACTAM SODIUM 4; .5 G/20ML; G/20ML
4.5 INJECTION, POWDER, LYOPHILIZED, FOR SOLUTION INTRAVENOUS EVERY 6 HOURS
Status: DISCONTINUED | OUTPATIENT
Start: 2019-12-29 | End: 2019-12-30

## 2019-12-29 RX ORDER — MAGNESIUM SULFATE HEPTAHYDRATE 40 MG/ML
2 INJECTION, SOLUTION INTRAVENOUS DAILY PRN
Status: DISCONTINUED | OUTPATIENT
Start: 2019-12-29 | End: 2020-01-10

## 2019-12-29 RX ORDER — ASPIRIN 325 MG
325 TABLET ORAL DAILY
Status: DISCONTINUED | OUTPATIENT
Start: 2019-12-30 | End: 2019-12-30

## 2019-12-29 RX ORDER — AMOXICILLIN 250 MG
2 CAPSULE ORAL 2 TIMES DAILY PRN
Status: DISCONTINUED | OUTPATIENT
Start: 2019-12-29 | End: 2020-01-21 | Stop reason: HOSPADM

## 2019-12-29 RX ORDER — ACETAMINOPHEN 325 MG/1
650 TABLET ORAL EVERY 4 HOURS PRN
Status: DISCONTINUED | OUTPATIENT
Start: 2019-12-29 | End: 2019-12-30

## 2019-12-29 RX ORDER — PIPERACILLIN SODIUM, TAZOBACTAM SODIUM 3; .375 G/15ML; G/15ML
3.38 INJECTION, POWDER, LYOPHILIZED, FOR SOLUTION INTRAVENOUS ONCE
Status: COMPLETED | OUTPATIENT
Start: 2019-12-29 | End: 2019-12-29

## 2019-12-29 RX ORDER — CHLORHEXIDINE GLUCONATE ORAL RINSE 1.2 MG/ML
15 SOLUTION DENTAL EVERY 12 HOURS
Status: DISCONTINUED | OUTPATIENT
Start: 2019-12-29 | End: 2020-01-07 | Stop reason: CLARIF

## 2019-12-29 RX ORDER — POTASSIUM CHLORIDE 1.5 G/1.58G
20-40 POWDER, FOR SOLUTION ORAL
Status: DISCONTINUED | OUTPATIENT
Start: 2019-12-29 | End: 2020-01-07

## 2019-12-29 RX ORDER — ALUMINA, MAGNESIA, AND SIMETHICONE 2400; 2400; 240 MG/30ML; MG/30ML; MG/30ML
30 SUSPENSION ORAL EVERY 4 HOURS PRN
Status: DISCONTINUED | OUTPATIENT
Start: 2019-12-29 | End: 2020-01-21 | Stop reason: HOSPADM

## 2019-12-29 RX ORDER — PROPOFOL 10 MG/ML
5-75 INJECTION, EMULSION INTRAVENOUS CONTINUOUS
Status: DISCONTINUED | OUTPATIENT
Start: 2019-12-29 | End: 2020-01-07

## 2019-12-29 RX ORDER — NICOTINE POLACRILEX 4 MG
15-30 LOZENGE BUCCAL
Status: DISCONTINUED | OUTPATIENT
Start: 2019-12-29 | End: 2019-12-31

## 2019-12-29 RX ORDER — HYDROXYZINE HYDROCHLORIDE 50 MG/1
50 TABLET, FILM COATED ORAL 2 TIMES DAILY PRN
Status: ON HOLD | COMMUNITY
End: 2020-01-21

## 2019-12-29 RX ORDER — NOREPINEPHRINE BITARTRATE 0.06 MG/ML
.03-.4 INJECTION, SOLUTION INTRAVENOUS CONTINUOUS
Status: DISCONTINUED | OUTPATIENT
Start: 2019-12-29 | End: 2020-01-06

## 2019-12-29 RX ORDER — DULOXETIN HYDROCHLORIDE 60 MG/1
120 CAPSULE, DELAYED RELEASE ORAL EVERY MORNING
COMMUNITY

## 2019-12-29 RX ORDER — PROPOFOL 10 MG/ML
30 INJECTION, EMULSION INTRAVENOUS CONTINUOUS
Status: DISCONTINUED | OUTPATIENT
Start: 2019-12-29 | End: 2019-12-29

## 2019-12-29 RX ORDER — TRAZODONE HYDROCHLORIDE 100 MG/1
150 TABLET ORAL AT BEDTIME
COMMUNITY
End: 2023-01-01

## 2019-12-29 RX ORDER — POTASSIUM CHLORIDE 7.45 MG/ML
10 INJECTION INTRAVENOUS
Status: DISCONTINUED | OUTPATIENT
Start: 2019-12-29 | End: 2020-01-07

## 2019-12-29 RX ORDER — NALOXONE HYDROCHLORIDE 0.4 MG/ML
.1-.4 INJECTION, SOLUTION INTRAMUSCULAR; INTRAVENOUS; SUBCUTANEOUS
Status: DISCONTINUED | OUTPATIENT
Start: 2019-12-29 | End: 2020-01-21 | Stop reason: HOSPADM

## 2019-12-29 RX ORDER — AMOXICILLIN 250 MG
1 CAPSULE ORAL 2 TIMES DAILY PRN
Status: DISCONTINUED | OUTPATIENT
Start: 2019-12-29 | End: 2020-01-21 | Stop reason: HOSPADM

## 2019-12-29 RX ORDER — GABAPENTIN 800 MG/1
1600 TABLET ORAL 3 TIMES DAILY
Status: ON HOLD | COMMUNITY
End: 2023-01-01

## 2019-12-29 RX ORDER — POTASSIUM CHLORIDE 29.8 MG/ML
20 INJECTION INTRAVENOUS
Status: DISCONTINUED | OUTPATIENT
Start: 2019-12-29 | End: 2020-01-07

## 2019-12-29 RX ORDER — POTASSIUM CHLORIDE 1500 MG/1
20-40 TABLET, EXTENDED RELEASE ORAL
Status: DISCONTINUED | OUTPATIENT
Start: 2019-12-29 | End: 2020-01-07

## 2019-12-29 RX ORDER — MAGNESIUM SULFATE HEPTAHYDRATE 40 MG/ML
4 INJECTION, SOLUTION INTRAVENOUS EVERY 4 HOURS PRN
Status: DISCONTINUED | OUTPATIENT
Start: 2019-12-29 | End: 2020-01-07

## 2019-12-29 RX ORDER — ASPIRIN 81 MG/1
324 TABLET, CHEWABLE ORAL ONCE
Status: DISCONTINUED | OUTPATIENT
Start: 2019-12-29 | End: 2019-12-30

## 2019-12-29 RX ORDER — PIPERACILLIN SODIUM, TAZOBACTAM SODIUM 3; .375 G/15ML; G/15ML
3.38 INJECTION, POWDER, LYOPHILIZED, FOR SOLUTION INTRAVENOUS EVERY 6 HOURS
Status: DISCONTINUED | OUTPATIENT
Start: 2019-12-29 | End: 2019-12-29 | Stop reason: DRUGHIGH

## 2019-12-29 RX ORDER — LIDOCAINE 40 MG/G
CREAM TOPICAL
Status: DISCONTINUED | OUTPATIENT
Start: 2019-12-29 | End: 2020-01-21 | Stop reason: HOSPADM

## 2019-12-29 RX ORDER — PROPOFOL 10 MG/ML
INJECTION, EMULSION INTRAVENOUS
Status: DISCONTINUED
Start: 2019-12-29 | End: 2019-12-29 | Stop reason: HOSPADM

## 2019-12-29 RX ORDER — METHADONE HYDROCHLORIDE 10 MG/1
10 TABLET ORAL
Status: ON HOLD | COMMUNITY
End: 2020-01-21

## 2019-12-29 RX ORDER — ALBUTEROL SULFATE 0.83 MG/ML
2.5 SOLUTION RESPIRATORY (INHALATION)
Status: DISCONTINUED | OUTPATIENT
Start: 2019-12-29 | End: 2020-01-06

## 2019-12-29 RX ORDER — DEXTROSE MONOHYDRATE 25 G/50ML
25-50 INJECTION, SOLUTION INTRAVENOUS
Status: DISCONTINUED | OUTPATIENT
Start: 2019-12-29 | End: 2019-12-31

## 2019-12-29 RX ORDER — ONDANSETRON 4 MG/1
4 TABLET, ORALLY DISINTEGRATING ORAL EVERY 6 HOURS PRN
Status: DISCONTINUED | OUTPATIENT
Start: 2019-12-29 | End: 2020-01-21 | Stop reason: HOSPADM

## 2019-12-29 RX ORDER — MULTIVIT WITH MINERALS/LUTEIN
1 TABLET ORAL DAILY
Status: ON HOLD | COMMUNITY
End: 2020-01-21

## 2019-12-29 RX ORDER — ONDANSETRON 2 MG/ML
4 INJECTION INTRAMUSCULAR; INTRAVENOUS EVERY 6 HOURS PRN
Status: DISCONTINUED | OUTPATIENT
Start: 2019-12-29 | End: 2020-01-21 | Stop reason: HOSPADM

## 2019-12-29 RX ORDER — ACETAMINOPHEN 650 MG/1
650 SUPPOSITORY RECTAL EVERY 4 HOURS PRN
Status: DISCONTINUED | OUTPATIENT
Start: 2019-12-29 | End: 2019-12-30

## 2019-12-29 RX ORDER — SODIUM CHLORIDE 9 MG/ML
INJECTION, SOLUTION INTRAVENOUS CONTINUOUS
Status: DISCONTINUED | OUTPATIENT
Start: 2019-12-29 | End: 2020-01-01

## 2019-12-29 RX ADMIN — SODIUM CHLORIDE 1000 ML: 9 INJECTION, SOLUTION INTRAVENOUS at 15:32

## 2019-12-29 RX ADMIN — PIPERACILLIN SODIUM AND TAZOBACTAM SODIUM 4.5 G: 4; .5 INJECTION, POWDER, LYOPHILIZED, FOR SOLUTION INTRAVENOUS at 23:35

## 2019-12-29 RX ADMIN — PROPOFOL 30 MCG/KG/MIN: 10 INJECTION, EMULSION INTRAVENOUS at 18:14

## 2019-12-29 RX ADMIN — INSULIN ASPART 2 UNITS: 100 INJECTION, SOLUTION INTRAVENOUS; SUBCUTANEOUS at 21:37

## 2019-12-29 RX ADMIN — Medication 50 MCG/HR: at 19:35

## 2019-12-29 RX ADMIN — PANTOPRAZOLE SODIUM 40 MG: 40 INJECTION, POWDER, FOR SOLUTION INTRAVENOUS at 20:33

## 2019-12-29 RX ADMIN — HUMAN ALBUMIN MICROSPHERES AND PERFLUTREN 9 ML: 10; .22 INJECTION, SOLUTION INTRAVENOUS at 20:55

## 2019-12-29 RX ADMIN — CHLORHEXIDINE GLUCONATE 15 ML: 1.2 RINSE ORAL at 20:33

## 2019-12-29 RX ADMIN — PROPOFOL 30 MCG/KG/MIN: 10 INJECTION, EMULSION INTRAVENOUS at 14:59

## 2019-12-29 RX ADMIN — HEPARIN SODIUM 1050 UNITS/HR: 10000 INJECTION, SOLUTION INTRAVENOUS at 20:33

## 2019-12-29 RX ADMIN — SODIUM CHLORIDE 2000 ML: 9 INJECTION, SOLUTION INTRAVENOUS at 14:52

## 2019-12-29 RX ADMIN — PROPOFOL 30 MCG/KG/MIN: 10 INJECTION, EMULSION INTRAVENOUS at 22:26

## 2019-12-29 RX ADMIN — IOPAMIDOL 135 ML: 755 INJECTION, SOLUTION INTRAVENOUS at 16:21

## 2019-12-29 RX ADMIN — PIPERACILLIN AND TAZOBACTAM 3.38 G: 3; .375 INJECTION, POWDER, LYOPHILIZED, FOR SOLUTION INTRAVENOUS at 17:00

## 2019-12-29 RX ADMIN — SODIUM CHLORIDE 1000 ML: 9 INJECTION, SOLUTION INTRAVENOUS at 15:31

## 2019-12-29 ASSESSMENT — ENCOUNTER SYMPTOMS
ABDOMINAL PAIN: 1
VOMITING: 1

## 2019-12-29 ASSESSMENT — ACTIVITIES OF DAILY LIVING (ADL): ADLS_ACUITY_SCORE: 21

## 2019-12-29 NOTE — ED PROVIDER NOTES
History     Chief Complaint:  Altered Mental Status      HPI   Bhavani White is a 64 year old female who presents via EMS with altered mental status. Per EMS, the patient was taking methadone up to 3 days ago. They say she had started feeling unwell a few days ago. The patient's  says that she has not been taking any of her medications because she has not been feeling well. He says that the patient has been altered for at least the past 12 hours. EMS states that the patient was vomiting fecal matter while en route to the hospital. The patient complains of abdominal pain and vomiting.         Allergies:  Povidone Iodine  Toradol  Tramadol      Medications:    Albuterol  Aspirin  Cipro  Cymbalta  Gabapentin  Xopenex  Synthroid  Lisinopril  Metformin  Dolophine  Miralax  Macrobid  Mycostatin  Zofran ODT  Pyridium  Senokot  Zocor  Zanaflex  Ventolin      Past Medical History:    Sleep apnea  Osteoarthritis   MRSA  Obesity   Iatrogenic Cushing's disease  Hypothyroid  hypertension  Ulcer disease  Blood transfusion  UTI  Diabetes  Depression  Asthma  Anxiety   Methadone use  DISH  Neurogenic bladder  Ventral hernia  Lumbar spinal stenosis  Fibromyalgia    Past Surgical History:    Abdomen surgery  Laminectomy, facetectomy lumbar  Cystoscopy x5  Discectomy, fusion cervical anterior 3 levels  Suprapubic catheter  Hernia repair  Hysterectomy  Inject botox x2  Cataract surgery x2  Carpal tunnel release    Family History:    Depression  hypertension  Heart disease  Depression  Connective tissue disorder  Prostate cancer  Colon cancer  Bladder cancer     Social History:  Smoking Status: Never Smoker  Smokeless Tobacco: Never Used  Alcohol Use: Negative   Drug Use: Positive   [facility]   Marital Status:         Review of Systems   Gastrointestinal: Positive for abdominal pain and vomiting.   Psychiatric/Behavioral: Positive for behavioral problems.   All other systems reviewed and are  negative.      Physical Exam     Patient Vitals for the past 24 hrs:   BP Temp Temp src Pulse Heart Rate Resp SpO2 Weight   12/29/19 1715 96/68 -- -- -- 90 16 96 % --   12/29/19 1700 103/69 -- -- 90 87 23 91 % --   12/29/19 1650 108/72 -- -- 92 106 16 95 % --   12/29/19 1645 108/74 -- -- 92 87 9 96 % --   12/29/19 1633 109/76 -- -- -- 94 12 98 % --   12/29/19 1549 109/76 -- -- 104 -- 16 98 % --   12/29/19 1540 108/77 -- -- 107 -- 16 97 % --   12/29/19 1536 102/72 -- -- 109 -- 16 -- --   12/29/19 1525 -- 100.7  F (38.2  C) Temporal -- -- -- -- --   12/29/19 1510 94/70 -- -- 115 116 17 96 % --   12/29/19 1505 95/59 -- -- 116 116 13 94 % --   12/29/19 1500 136/77 -- -- 115 114 11 97 % --   12/29/19 1428 118/51 -- -- 111 111 (!) 40 (!) 40 % 140 kg (308 lb 10.3 oz)        Physical Exam  Vitals signs and nursing note reviewed.   Constitutional:       Comments: Obese diaphoretic cyanotic mottled tachypneic   HENT:      Head: Normocephalic and atraumatic.   Eyes:      Comments: Dilated pupils symmetric   Neck:      Musculoskeletal: Normal range of motion.   Cardiovascular:      Rate and Rhythm: Regular rhythm. Tachycardia present.   Pulmonary:      Effort: Respiratory distress present.      Breath sounds: No stridor. No rhonchi.   Chest:      Chest wall: No tenderness.   Abdominal:      General: There is no distension.      Palpations: Abdomen is soft.   Musculoskeletal: Normal range of motion.   Skin:     Capillary Refill: Capillary refill takes more than 3 seconds.      Coloration: Skin is cyanotic and pale.   Neurological:      Mental Status: She is lethargic and disoriented.      GCS: GCS eye subscore is 3. GCS verbal subscore is 3. GCS motor subscore is 5.   Psychiatric:      Comments: Unable to assess due to altered mental status         Emergency Department Course     ECG:  ECG taken at 1414  Undetermined rhythm  Left axis deviation  Incomplete right  bundle branch block   Septal infarct, age undetermined  Lateral  injury pattern  ACUTE MI/STEMI  Abnormal ECG  Rate 112 bpm. KY interval * ms. QRS duration 114 ms. QT/QTc 274/374 ms. P-R-T axes 56 -35 -58.    ECG:  ECG taken at 1440  Sinus tachycardia  Left axis deviation  Nonspecific  intraventricular block  Cannot rule out septal infarct, age undetermined  T wave abnormality, consider lateral ischemia    Abnormal ECG  Rate 144 bpm. KY interval 138 ms. QRS duration 130 ms. QT/QTc 286/442 ms. P-R-T axes -5 -40 118.     ECG:  ECG taken at 1654, ECG read at 1755  Sinus rhythm with occasional premature ventricular complexes  Septal infarct, age undetermined  Abnormal ECG  Rate 92 bpm. KY interval 140 ms. QRS duration 124 ms. QT/QTc 328/405 ms. P-R-T axes 57 -29 148.     Imaging:  Radiology findings were communicated with the patient who voiced understanding of the findings.    CT Chest (PE) Abdomen Pelvis w Contrast  1. Significant fat stranding along the pylorus anterior aspect of the  duodenum, with small adjacent focus of extraluminal gas, findings  likely represent peptic ulcer with possible contained perforation.  2. The tip of enteric tube significantly stretched the anterior wall  of the pylorus.  3. No evidence of pulmonary embolism.  4. The main pulmonary artery is enlarged measuring 3.3 cm, this can be  seen with pulmonary hypertension.  5. Small bilateral pleural effusions, left larger than right with  associated basilar atelectasis/consolidation.  6. A few patchy areas of groundglass pulmonary opacities in the right  middle lobe, indeterminate, could be infectious.  7. Heterogenous enhancement of the liver of indeterminate etiology and  significance.  Reading per radiology     CT Head w/o Contrast  No acute intracranial abnormality.  TORI REID MD  Reading per radiology    XR Chest Port 1 View  Single portable AP view of the chest was obtained.  Endotracheal tube tip projects over mid thoracic trachea approximately  4 cm from the . Enteric tube crosses the  "diaphragm with the  distal tip outside the field of view. Right IJ central catheter  projects over high SVC. Although the image is mildly rotated, there is  apparent left-to-right midline shift, which could be due to  atelectatic changes of the right lung, recommend repeating the exam  with better positioning. Small bilateral pleural effusions. No  significant pneumothorax.  OCTAVIO MARTIN MD  Reading per radiology    Laboratory:  Laboratory findings were communicated with the patient who voiced understanding of the findings.    Creatinine POCT: creatinine 1.6 (H), GFR 33 (L)  Blood Gas lactate erin POCT: pH 7.33, PCO2 57 (H), PO2 46, Bicarbonate 30 (H), O2 Sat 77, lactic acid 4.1 (HH)   CBC: WBC 20.5 (H), HGB 19.2 (H),   CMP:  (H), BUN 65 (H), creatinine 1.56 (H), GFR 35 (H), albumin 2.9 (L),  (HH),  (HH) o/w WNL  INR: 1.61 (H)  PTT: 28  Lactic Acid (Resulted: 1608): 3.0 (H)   Troponin (Collected 1436): 19.402 (HH)   ABO/Rh type and screen: B/neg, neg  Lactic Acid (Resulted: 1608): 3.0 (H)     UA: ketone 10 (A), blood moderate (A), protein albumin 300 (A), Leukocyte esterase large (A), RBC 3 (H), WBC 13 (H), bacteria moderate (A), mucous present (A), hyaline casts (A) o/w WNL       Intubation      INDICATION: Acute respiratory failure.    PERFORMED BY:     CONSENT: The procedure was performed in an emergent situation.    TIMEOUT: Immediately prior to procedure a \"time out\" was called to verify the correct patient, procedure, equipment, support staff and site/side marked as required.    INTUBATION METHOD: Glidescope    PATIENT STATUS: RSI    PREOXYGENATION: Mask    PRETREATMENT MEDICATIONS: None    SEDATIVES: Etomidate    PARALYTIC: vecuronium    LARYNGOSCOPE SIZE: Mac 4    TUBE SIZE: 7.5 cuffed with cuff inflated after placement  Number of attempts: 1  Cricoid pressure: yes  Cords visualized: yes    POST-PROCEDURE ASSESSMENT: Breath sounds equal bilaterally with chest rise and " absent over the epigastrium, Chest x-ray interpreted by me demonstrating endotracheal tube in appropriate position and CO2 detector.    ETT TO TEETH: 23 cm  Tube secured with: ETT ortega    Patient tolerated the procedure well with no immediate complications.  COMPLICATIONS:  None            [facility]    -Central Line  Date/Time: 12/29/2019 2:18 PM  Performed by: Tong Pierson MD  Authorized by: Tong Pierson MD     UNIVERSAL PROTOCOL   Site Marked: No  Prior Images Obtained and Reviewed:  No  Required items: Required blood products, implants, devices and special equipment available    Patient identity confirmed:  Anonymous protocol, patient vented/unresponsive  Patient was reevaluated immediately before administering moderate or deep sedation or anesthesia  Confirmation Checklist:  Procedure was appropriate and matched the consent or emergent situation  Time out: Immediately prior to the procedure a time out was called    Universal Protocol: the Joint Commission Universal Protocol was followed    Preparation: Patient was prepped and draped in usual sterile fashion           ANESTHESIA    Local Anesthetic: Lidocaine 1% without epinephrine      PRE-PROCEDURE DETAILS:     Hand hygiene: Hand hygiene performed prior to insertion      Sterile barrier technique: All elements of maximal sterile technique followed      Skin preparation:  2% chlorhexidine  PROCEDURE DETAILS:     Location:  R internal jugular    Patient position:  Trendelenburg    Procedural supplies:  Triple lumen    Landmarks identified: yes      Ultrasound guidance: yes      Number of attempts:  1    Successful placement: yes      POST PROCEDURE DETAILS:      Post-procedure:  Line sutured and dressing applied    Assessment:  Blood return through all ports, no pneumothorax on x-ray and placement verified by x-ray  PROCEDURE   Patient Tolerance:  Patient tolerated the procedure well with no immediate  complications  Describe Procedure: Right internal jugular central line placed due to need for multiple ports and IV access concerns for lactic acidosis sepsis and hypotension.    Interventions:  1452 NS 2 L IV  1459 diprivan 30 mcg/kg/min  1508 diprivan 40 mcg/kg/min  1520 diprivan 30 mcg/kg/min  1532 NS 1 L IV   1700 zosyn 3.375 mg IV     Emergency Department Course:    1405 Nursing notes and vitals reviewed. I performed an exam of the patient as documented above.     1418 I performed the central line placement and intubation procedure as documented above.     1436 IV was inserted and blood was drawn for laboratory testing, results above.     1448 The patient was sent for a XR while in the emergency department, results above.      1601 The patient was sent for a CT while in the emergency department, results above.     1626 The patient was sent for a CT while in the emergency department, results above.       1636 I spoke with Dr. Milan of the ICU service regarding patient's presentation, findings, and plan of care.     1649 The patient provided a urine sample here in the emergency department. This was sent for laboratory testing, findings above.     1705 I spoke with Dr. Jackson of the ICU service regarding patient's presentation, findings, and plan of care.     1712 I spoke with Dr. Lopez of the general surgery service regarding patient's presentation, findings, and plan of care.      The patient is admitted into the care of Dr. Milan.     Impression & Plan      CMS Diagnoses:   The patient has signs of Severe Sepsis  as evidenced by:    1. 2 SIRS criteria, AND  2. Suspected infection, AND   3. Organ dysfunction: Lactic Acid > 2.0, Urine output <0.5/kg/hr for more than 2 hours despite fluid resuscitation, Acute encephalopathy due to sepsis and New need for positive pressure ventilation     Time severe sepsis diagnosis confirmed:1445  12/29/19  as this was the time when Lactate resulted, and the level was >  2.0    3 Hour Severe Sepsis Bundle Completion:  1. Initial Lactic Acid Result:   Recent Labs   Lab Test 12/29/19  1534 12/29/19  1437 12/10/17  1628   LACT 3.0* 4.1* 1.7     2. Blood Cultures before Antibiotics: Yes  3. Broad Spectrum Antibiotics Administered:  yes       Anti-infectives (From admission through now)    Start     Dose/Rate Route Frequency Ordered Stop    12/29/19 1640  piperacillin-tazobactam (ZOSYN) 3.375 g vial to attach to  mL bag      3.375 g  over 30 Minutes Intravenous ONCE 12/29/19 1639 12/29/19 1730          4. Volume of IV Fluid administered in ED: 3000     REMINDER: Please use septic shock SmartPhrase for Lactate > 4 or a patient  requiring vasopressors after initial fluid bolus (meaning persistent hypotension)      If one the following conditions is present, a 30cc/kg bolus is recommended as part of the 6 hour bundle (IBW can be used for BMI >30, or document refusal/contraindication)    1.   initial hypotension  defined as 2 bps < 90 or map < 65 in the 6hrs before or 6hrs  after time zero.    2.  Lactate >4.                   Severe Sepsis reassessment:  1. Repeat Lactic Acid Level:   2. MAP>65 after initial IVF bolus, will continue to monitor fluid status and vital signs    I attest to having performed a repeat sepsis exam and assessment of perfusion at 1700 and the results demonstrate improved perfusion.        Medical Decision Making:  Bhavani White is a 64 year old female who presents to the emergency department today for evaluation of altered mental status.  On arrival patient is so waving arms and reaching in the bartolo but is diaphoretic cyanotic and mottled.  Clinical presentation is concerning for sepsis and or near cardiac arrest.  Ultimately patient was vomiting en route and concerns for aspiration as well as protection of airway and therefore intubation was performed by me to stabilize her airway.  Chest x-ray shows ET tube in good position central line was placed for  vascular access and need for aggressive IV hydration in the setting of sepsis.  Initial lactic acid was 4.  IV antibiotics after blood cultures were given in the form of Zosyn.  She was noted to have a fever.  Patient was started on propofol to assist in sedation head CT shows no acute bleed.  CT chest is negative for pulmonary embolism abdominal CT shows concern for small perforated viscus contained from the duodenum.  IV Zosyn given empirically for sepsis.  Cultures are pending.  Perfusion exam as well as lactic acid improved with hydration.  Fentanyl drip was started per ICU attending due to concerns for opiate withdrawal and need for low-dose opiates to prevent withdrawal symptoms.  Will recommend admission to the ICU for ongoing care.  Care was discussed with Dr. Lopez who does not recommend emergency operation at this time.    Critical Care time was 75 minutes for this patient excluding procedures.  Critical care time documented due to need identification of acute respiratory distress the decision to intubate based on her presenting altered mental status and diaphoresis concerns for perforated viscus fecal and vomit concerns for opiate withdrawal and sepsis and septic shock.  Diagnosis:      ICD-10-CM    1. [place] coma scale total score 9-12, at arrival to emergency department R40.2422    2. Bacterial sepsis (H) A41.9    3. Duodenal ulcer perforation (H) K26.5    4. NSTEMI (non-ST elevated myocardial infarction) (H) I21.4         Disposition:   Findings and plan explained to the Patient who consents to admission. Discussed the patient with Dr. Milan, who will admit the patient to a adult ICU bed for further monitoring, evaluation, and treatment.     Scribe Disclosure:  I, Ryan Velazquez, am serving as a scribe at 2:08 PM on 12/29/2019 to document services personally performed by Tong Pierson MD based on my observations and the provider's statements to me.       EMERGENCY DEPARTMENT        Tong Pierson MD  12/29/19 4922

## 2019-12-29 NOTE — ED NOTES
DATE:  12/29/2019   TIME OF RECEIPT FROM LAB:  1526  LAB TEST:  ast alt  LAB VALUE:  715/743  RESULTS GIVEN WITH READ-BACK TO (PROVIDER):  Pierson  TIME LAB VALUE REPORTED TO PROVIDER:   1524

## 2019-12-29 NOTE — H&P
Critical Care  Note      12/29/2019    Name: Bhavani White MRN#: [id number]   Age: 64 year old YOB: 1955     [facility]  [facility] #    MV DAY #             Problem List:   Active Problems:    Perforated duodenal ulcer (H)           Summary/Hospital Course:     64 year old female with history of chronic pain on methadone, obesity, ANIYA, hypothyroidism, DM and prior abdominal surgery presented to ED this evening with altered mental status with abdominal pain and emesis discovered to have perforated gastric ulcer on imaging.     Per EMS notes patient not feeling well with 3 day prodrome not taking meds (methadone) with new altered mental status this evening prompting her  to call to 911.  Per EMS en route, patient vomited 'fecal material' with suspected aspiration. On arrival in ED, patient noted to be altered tachypneic, thrashing with cool, mottled skin and sats in 40% (?)  prompting immediate intubation for respiratory failure. Hemodynamics initially appropriate with MAP in 70s. EKG with some dynamic changes on initial tracing with new incomplete bundle branch block Labs notable for ED work notable for tropon ~20 , lactic acid 3 , ROLANDO 1.6 and acute hepatitis with LFTs in 700's.  WBC 20K, elevated Hgb 19. CT CAP demonstrated small effusions patchy GGO in RML consistent with aspiration, fat stranding around the pylorus and anterior duodenum suspicious for perforation as small amount of extraluminal gas.     TLC subsequently placed, zosyn started and 3L NS bolused. Surgery contacted in ED with recommendations for conservative management, case also discussed with Cardiology.        Assessment and plan :     Bhavani White IS a 64 year old female admitted on 12/29/2019 for abdominal sepsis 2/2 perforated duodenal ulcer, elevated troponins/NSTEMI, acute hypoxemic respiratory failure requiring intubation.    I have personally reviewed the daily labs, imaging studies, cultures and discussed the case  with referring physician and consulting physicians.     My assessment and plan by system for this patient is as follows:    Neurology/Psychiatry:   1. Encephalopathy - metabolic, septic   2. Pain/analgesia, chronic, on chronic methadone,   3. Anxiety  Plan   - fentanyl gtt   - goal RASS -2      Cardiovascular:   1.NSTEMI  Trop ~20 suggest more than demand, suspect multivessel disease, EKG without acute findings  Cardiology consult appreciated.   2.Rhythm - iBBB on serial EKGs similar pattern seen in 2018, no discernible acute changes   Plan  - case discussed with cards, with heparinize and ASA  - no plans for immediate intervention unless patiet become HD unstable, has refractory dysrhythmia, or ST elevations      Pulmonary/Ventilator Management:   1. Acute hypoxemic respiratory failure -   Suspect element of chronicity given polycythemia -  2. Aspiration Pneumonia - noted RML GGO  Plan  - continue lung protective ventilation  - antibiotics     GI and Nutrition :   1. Perforated ulcer - surgery aware, supportive care - difficult situation given current status with concern for AMI   Plan  - supportive cares  - serial labs and eval  - consider gastrograffin repeat imaging if not improving    Renal/Fluids/Electrolytes:   1. ROLANDO- Cr 1.6 from baseline < 0.8.  Electrolytes within normal    Plan  - monitor function and electrolytes as needed with replacement per ICU protocols. - generally avoid nephrotoxic agents such as NSAID, IV contrast unless specifically required  - adjust medications as needed for renal clearance  - follow I/O's as appropriate.  -Cont volume resuscitation    Infectious Disease:   1. Abdominal sepsis secondary to perforated duodenal ulcer  2.Aspiration pneumonitis, high potential for aspiration PNA    Plan  - Cont Zosyn 4.5 Q6h      Endocrine:   1. H/o DMII  2. Stress induced hyperglycemia    Plan  - ICU insulin protocol, goal sugar <180      Hematology/Oncology:   1. Polycythemia- may be suggestive  of dehydration, but patient with hematocrit of > 17 on previous admission.      Plan  - Monitor daily CBC  -Heparin gtt for NSTEMI     IV/Access:   1. Venous access - PIV  2. Arterial access - Radial arterial line as needed    Plan  - central access to be established if patient is requiring vasopressors      ICU Prophylaxis:   1. DVT: Hep gtt- no bolus  2. VAP: HOB 30 degrees, chlorhexidine rinse  3. Stress Ulcer: PPI  4. Restraints: Nonviolent soft two point restraints required and necessary for patient safety and continued cares and good effect as patient continues to pull at necessary lines, tubes despite education and distraction. Will readdress daily.   5. Wound care -NA  6. Feeding - NPO for medical reasons.  NGT to suction.    7. Family Update: Family updated on arrival to ICU  8. Disposition - SICU.  Prognosis guarded.          Key goals for next 24 hours:   1.  Cont heparin gtt and trend troponins in the setting of NSTEMI/ demand ischemia  2.  Cont supportive management of abdominal sepsis/ perforated duodenal ulcer with abx, PPI, NGT decompression.  3.  Ventilator management- pt to remain mechanically ventilated in the setting of acute hypoxic respiratory failure, aspiration pneumonitis.      Pt is at very high risk for complications from either cardiac or surgical intervention.  The hope is that her perforated duodenal ulcer is contained, and that it will heal with non operative management.  Her demand ischemia/ NSTEMI is likely secondary to her intraabdominal process, and so should hopefully improve with treatment of sepsis.               Medical History:     Past Medical History:   Diagnosis Date     Anxiety      Asthma      Depression      DM (diabetes mellitus) (H)      Frequent UTI      History of blood transfusion      History of ulcer disease 2014    She notes from [facility]; nearly . Discussed a hospitalization at [facility] for this.     Hypertension      Hypothyroid      Iatrogenic  Cushing's disease (H)     off prednisone now     Morbid obesity (H)      MRSA (methicillin resistant staph aureus) culture positive 5/24/2012    repeat cx 10/12/2012 no staph mentioned.     Osteoarthritis     multiple joings.     Other chronic pain      Sleep apnea     No longer uses cpap.     Past Surgical History:   Procedure Laterality Date     ABDOMEN SURGERY       C LAMINECTOMY,FACETECTOMY,LUMBAR  1982     C TOTAL KNEE ARTHROPLASTY  1999    left     CYSTOSCOPY N/A 9/21/2018    Procedure: CYSTOSCOPY;  Cystoscopy and Botox Injection Into the Bladder and suprapubic tube exchange;  Surgeon: Yoandy Rios MD;  [facility]     CYSTOSCOPY N/A 3/15/2019    Procedure: Cystoscopy, suprapubic tube exchange;  Surgeon: Yoandy Rios MD;  [facility]     CYSTOSCOPY FLEXIBLE, CYSTOSTOMY, INSERT TUBE SUPRAPUBIC, COMBINED N/A 11/1/2019    Procedure: Cystoscopy, Bladder Botox Injection, Suprapubic Tube Change;  Surgeon: Yoandy Rios MD;  [facility]     CYSTOSCOPY, INTRAVESICAL INJECTION N/A 8/23/2017    Procedure: CYSTOSCOPY, INTRAVESICAL INJECTION;  Cystoscopy, Botox Injection Into The Bladder  Latex Allergy ;  Surgeon: Yoandy Rios MD;  [facility]     CYSTOSCOPY, INTRAVESICAL INJECTION N/A 3/8/2018    Procedure: CYSTOSCOPY, INTRAVESICAL INJECTION;  Cystoscopy, Botox Injection Into The Bladder and suprapubic catheter exchange;  Surgeon: Yoandy Rios MD;  [facility]     DISCECTOMY, FUSION CERVICAL ANTERIOR THREE+ LEVELS, COMBINED Left 5/3/2016    Procedure: COMBINED DISCECTOMY, FUSION CERVICAL ANTERIOR THREE+ LEVELS;  Surgeon: Jasvir Torres MD;  [facility]: [facility]     GENITOURINARY SURGERY      suprapubic catheter     HERNIA REPAIR  1957    double hernia     HYSTERECTOMY, PAP NO LONGER INDICATED      CELIA and large ovarian tumor removed     INJECT BOTOX N/A 9/21/2018    Procedure: INJECT BOTOX;;  Surgeon: Yoandy Rios MD;  [facility]      INJECT BOTOX N/A 3/15/2019    Procedure: Inject Botox into Bladder;  Surgeon: Yoandy Rios MD;  [facility]     SURGICAL HISTORY OF -       left cataract surgery     SURGICAL HISTORY OF -   12/14    right cataract surgery and left laser revision     SURGICAL HISTORY OF -   March 2015    left carpal tunnel release     TONSILLECTOMY  1974     Social History     Socioeconomic History     Marital status:      Spouse name: Not on file     Number of children: Not on file     Years of education: Not on file     Highest education level: Bachelor's degree (e.g., BA, AB, BS)   Occupational History     Not on file   Social Needs     Financial resource strain: Not hard at all     Food insecurity:     Worry: Never true     Inability: Never true     Transportation needs:     Medical: No     Non-medical: No   Tobacco Use     Smoking status: Never Smoker     Smokeless tobacco: Never Used   Substance and Sexual Activity     Alcohol use: No     Frequency: Never     Drinks per session: Patient refused     Binge frequency: Patient refused     Drug use: Yes     Types: Marijuana     Comment: Medical canibis     Sexual activity: Not Currently   Lifestyle     Physical activity:     Days per week: Not on file     Minutes per session: Not on file     Stress: To some extent   Relationships     Social connections:     Talks on phone: Patient refused     Gets together: Patient refused     Attends Scientologist service: Patient refused     Active member of club or organization: No     Attends meetings of clubs or organizations: Patient refused     Relationship status:      Intimate partner violence:     Fear of current or ex partner: Not on file     Emotionally abused: Not on file     Physically abused: Not on file     Forced sexual activity: Not on file   Other Topics Concern     Parent/sibling w/ CABG, MI or angioplasty before 65F 55M? Not Asked   Social History Narrative     Not on file        Allergies   Allergen  "Reactions     Povidone Iodine      \"mild skin irritation\" per patient report     Toradol [Ketorolac] Other (See Comments)     Pt gets nightmares     Tramadol Other (See Comments)              Key Medications:       fentaNYL       propofol (DIPRIVAN) infusion 30 mcg/kg/min (12/29/19 1520)        Home Meds  No current facility-administered medications on file prior to encounter.   DULoxetine (CYMBALTA) 60 MG capsule, Take 120 mg by mouth every morning  gabapentin (NEURONTIN) 800 MG tablet, Take 1,600 mg by mouth 3 times daily  levothyroxine (SYNTHROID/LEVOTHROID) 88 MCG tablet, TAKE 1 TABLET(88 MCG) BY MOUTH DAILY  senna-docusate (SENOKOT-S;PERICOLACE) 8.6-50 MG per tablet, Take 2 tablets by mouth 2 times daily To prevent constipation while taking narcotic pain medication. Start with 1 tablet twice daily. If no bowel movement in 24 hours, increase to 2 tablets twice daily.  Discontinue if you have loose stools or when you are no longer taking narcotics.  VENTOLIN  (90 Base) MCG/ACT inhaler, INHALE 2 PUFFS INTO THE LUNGS EVERY 6 HOURS AS NEEDED FOR SHORTNESS OF BREATH  albuterol (PROVENTIL) (2.5 MG/3ML) 0.083% neb solution, Take 1 vial (2.5 mg) by nebulization every 4 hours as needed for shortness of breath / dyspnea or wheezing  aspirin 81 MG tablet, Take 81 mg by mouth daily   blood glucose (NO BRAND SPECIFIED) lancets standard, Use to test blood sugar 1 times daily or as directed.  blood glucose monitoring (FREESTYLE FREEDOM LITE) meter device kit, Use to test blood sugar.  blood glucose monitoring (NO BRAND SPECIFIED) meter device kit, Dispense freestyle zak glucose meter  blood glucose monitoring (NO BRAND SPECIFIED) test strip, Use to test blood sugars 1 times daily or as directed  levalbuterol (XOPENEX) 1.25 MG/3ML neb solution, TAKE ONE VIAL BY NEBULIZATION EVERY 8 HOURS AS NEEDED FOR SHORTNESS OF BREATH/DYSPNEA OR WHEEZING  lisinopril (PRINIVIL/ZESTRIL) 5 MG tablet, TAKE 1 TABLET(5 MG) BY MOUTH " DAILY  metFORMIN (GLUCOPHAGE-XR) 500 MG 24 hr tablet, TAKE 1 TABLET BY MOUTH TWICE DAILY WITH MEALS  methadone (DOLOPHINE) 10 MG tablet, Take 2 pills TID  MULTIVITAMIN OR, 1 a day  [] nitroFURantoin macrocrystal-monohydrate (MACROBID) 100 MG capsule, Take 1 capsule (100 mg) by mouth 2 times daily for 5 days  ondansetron (ZOFRAN ODT) 4 MG ODT tab, Take 1-2 tablets (4-8 mg) by mouth every 8 hours as needed for nausea  order for DME, Equipment being ordered: Nebulizer  order for DME, Equipment being ordered: BIPAP.   15/, Rate of 12, 2L O2 to keep sats 90-95%.  order for DME, Equipment being ordered: Nebulizer  order for DME, Equipment being ordered: Hospital Bed, total electric (head, foot, and height adjustments), with any type side rails, with mattress  order for DME, Equipment being ordered: Motorized Wheelchair  order for DME, Equipment being ordered: Trapeze bar for hospital bed  [] oxybutynin ER (DITROPAN XL) 15 MG 24 hr tablet, Take 1 tablet (15 mg) by mouth daily  Phenazopyridine HCl (PYRIDIUM PO), Take 2 tablets by mouth 3 times daily as needed Dose unknown  senna-docusate (SENOKOT-S;PERICOLACE) 8.6-50 MG per tablet, Take 2 tablets by mouth 2 times daily  simvastatin (ZOCOR) 20 MG tablet, TAKE 1 TABLET(20 MG) BY MOUTH AT BEDTIME  [] sulfamethoxazole-trimethoprim (BACTRIM DS/SEPTRA DS) 800-160 MG tablet, Take 1 tablet by mouth 2 times daily for 5 days  [] sulfamethoxazole-trimethoprim (BACTRIM DS/SEPTRA DS) 800-160 MG tablet, Take 1 tablet by mouth 2 times daily for 7 days  [] sulfamethoxazole-trimethoprim (BACTRIM DS/SEPTRA DS) 800-160 MG tablet, Take 1 tablet by mouth 2 times daily for 5 days  tiZANidine (ZANAFLEX) 4 MG tablet, Take 1 tablet (4 mg) by mouth 3 times daily  VENTOLIN  (90 Base) MCG/ACT inhaler, INHALE 2 PUFFS INTO THE LUNGS EVERY 6 HOURS AS NEEDED FOR SHORTNESS OF BREATH               Physical Examination:   Temp:  [100.7  F (38.2  C)] 100.7  F (38.2   C)  Pulse:  [] 90  Heart Rate:  [] 90  Resp:  [9-40] 16  BP: ()/(51-77) 96/68  FiO2 (%):  [100 %] 100 %  SpO2:  [40 %-98 %] 96 %  No intake or output data in the 24 hours ending 12/29/19 1732  Wt Readings from Last 4 Encounters:   12/29/19 140 kg (308 lb 10.3 oz)   11/01/19 113.4 kg (250 lb)   10/28/19 121.7 kg (268 lb 3.2 oz)   08/29/19 117.5 kg (259 lb)     BP - Mean:  [63-92] 75  Ventilation Mode: CMV/AC  (Continuous Mandatory Ventilation/ Assist Control)  FiO2 (%): 100 %  Rate Set (breaths/minute): 16 breaths/min  Tidal Volume Set (mL): 500 mL  PEEP (cm H2O): 5 cmH2O  Oxygen Concentration (%): 100 %  Resp: 16    No lab results found in last 7 days.    GEN: no acute distress   HEENT: head ncat, sclera anicteric, OP patent, trachea midline   PULM: unlabored synchronous with vent, clear anteriorly    CV/COR: RRR S1S2 no gallop,  No rub, no murmur  ABD: soft nontender, hypoactive bowel sounds, no mass  EXT:  Edema   warm  NEURO: grossly intact  SKIN: no obvious rash  LINES: clean, dry intact         Data:   All data and imaging reviewed     ROUTINE ICU LABS (Last four results)  CMP  Recent Labs   Lab 12/29/19  1443 12/29/19  1436   NA  --  143   POTASSIUM  --  4.2   CHLORIDE  --  106   CO2  --  28   ANIONGAP  --  9   GLC  --  207*   BUN  --  65*   CR  --  1.56*   GFRESTIMATED 33* 35*   GFRESTBLACK 39* 41*   DORY  --  9.8   PROTTOTAL  --  6.8   ALBUMIN  --  2.9*   BILITOTAL  --  1.1   ALKPHOS  --  108   AST  --  715*   ALT  --  743*     CBC  Recent Labs   Lab 12/29/19  1436   WBC 20.5*   RBC 6.64*   HGB 19.2*   HCT 57.9*   MCV 87   MCH 28.9   MCHC 33.2   RDW 15.8*        INR  Recent Labs   Lab 12/29/19  1535 12/29/19  1436   INR 1.61* Canceled, Test credited     Arterial Blood GasNo lab results found in last 7 days.    All cultures:  No results for input(s): CULT in the last 168 hours.  Recent Results (from the past 24 hour(s))   XR Chest Port 1 View    Narrative    CHEST PORTABLE ONE VIEW    12/29/2019 2:45 PM     HISTORY: Shortness of breath.    COMPARISON: Chest x-ray on 4/26/2019      Impression    IMPRESSION: Single portable AP view of the chest was obtained.  Endotracheal tube tip projects over mid thoracic trachea approximately  4 cm from the . Enteric tube crosses the diaphragm with the  distal tip outside the field of view. Right IJ central catheter  projects over high SVC. Although the image is mildly rotated, there is  apparent left-to-right midline shift, which could be due to  atelectatic changes of the right lung, recommend repeating the exam  with better positioning. Small bilateral pleural effusions. No  significant pneumothorax.    OCTAVIO MARTIN MD   CT Head w/o Contrast    Narrative    CT SCAN OF THE HEAD WITHOUT CONTRAST   12/29/2019 4:29 PM     HISTORY: Altered vomiting.    TECHNIQUE:  Axial images of the head and coronal reformations without  IV contrast material. Radiation dose for this scan was reduced using  automated exposure control, adjustment of the mA and/or kV according  to patient size, or iterative reconstruction technique.    COMPARISON: Head CT 7/26/2017.    FINDINGS:  Mildly limited exam due to artifact from large left-sided earring.  Mild volume loss is present. The cerebral hemispheres, brainstem, and  cerebellum otherwise demonstrate normal morphology and attenuation. No  evidence of acute ischemia, hemorrhage, mass, mass effect or  hydrocephalus. The visualized calvarium, tympanic cavities, mastoid  cavities, and paranasal sinuses are unremarkable.      Impression    IMPRESSION:   No acute intracranial abnormality.    TORI REID MD   CT Chest (PE) Abdomen Pelvis w Contrast    Narrative    CT CHEST PE ABDOMEN AND PELVIS WITH CONTRAST, 12/29/2019 4:29 PM    TECHNIQUE:  Helical CT images from the thoracic inlet through the  symphysis pubis were obtained  with contrast. Contrast dose: 135 mL  [id number]    COMPARISON: CT abdomen and pelvis on  4/26/2019.    HISTORY: Feculent vomit, SOB, altered.    FINDINGS:    Chest/mediastinum: Endotracheal tube tip is in the mid thoracic  trachea. No evidence of pulmonary embolism. No cardiomegaly or  pericardial effusion. Moderate atherosclerotic vascular calcification  of the coronary arteries and thoracic aorta. The main pulmonary artery  is mildly enlarged measuring 3.3 cm, this can be seen with pulmonary  hypertension.    Lung/pleura: Small bilateral pleural effusions, left larger than right  with associated basilar atelectasis/consolidation. A few patchy areas  of groundglass pulmonary opacities in the right middle lobe,  indeterminant, could be infectious.    Abdomen and pelvis: Heterogenous enhancement of the liver. Gallbladder  is distended, otherwise unremarkable. No splenomegaly. No adrenal  nodules. Diffuse fatty replacement of the pancreas. No evidence of  kidney stones or hydronephrosis. Bilateral renal cysts.    No abnormally dilated bowel loops. Large amount of stool throughout  the colon. The tip of the enteric tube significantly stretched the  anterior wall of the pylorus of the stomach. There is significant fat  stranding along the pylorus and anterior aspect of the duodenum with  possible duodenal perforation. Small focus of gas along the lateral  aspect of the gastric pylorus/duodenum (series 8 image 72), likely  represents perforated peptic ulcer. Moderate predominantly  aortobiiliac atherosclerotic vascular calcification.    Bones and soft tissues: Multilevel degenerative changes of the  visualized spine. Multiple fat-containing ventral hernias.      Impression    IMPRESSION:   1. Significant fat stranding along the pylorus anterior aspect of the  duodenum, with small adjacent focus of extraluminal gas, findings  likely represent peptic ulcer with possible contained perforation.  2. The tip of enteric tube significantly stretched the anterior wall  of the pylorus.  3. No evidence of pulmonary  embolism.  4. The main pulmonary artery is enlarged measuring 3.3 cm, this can be  seen with pulmonary hypertension.  5. Small bilateral pleural effusions, left larger than right with  associated basilar atelectasis/consolidation.  6. A few patchy areas of groundglass pulmonary opacities in the right  middle lobe, indeterminate, could be infectious.  7. Heterogenous enhancement of the liver of indeterminate etiology and  significance.           Billing: This patient is critically ill: Yes. Total critical care time today 45 min.

## 2019-12-29 NOTE — ED NOTES
Bed: ST03  Expected date:   Expected time:   Means of arrival:   Comments:  Laura 587 - 83F methadone withdrawal, sepsis , vomiting fecal matter - ETA 4min

## 2019-12-29 NOTE — ED TRIAGE NOTES
Pt coming from home via EMS. Pt  told medics that pt had not been feeling well for a couple days and had not had her methadone. Pt mental status had been altered today which prompted him to call 911. Pt vomited enroute with medics and they st she probably aspirated as after emesis pt breathing became rapid.

## 2019-12-29 NOTE — PHARMACY-ADMISSION MEDICATION HISTORY
Pharmacy Medication History  Admission medication history interview status for the 12/29/2019  admission is complete. See EPIC admission navigator for prior to admission medications     Medication history sources: Surescripts and Rx bottles brought in with patient  Medication history source reliability: Moderate  Adherence assessment: Unknown - was noted that she may not have taken any meds for several days    Significant changes made to the medication list:  -Added hydroxyzine, discontinued antibiotics that appeared to have been completed, modified gabapentin dose, modified methadone dose.      Additional medication history information:   -In past couple months it appears that she has had course of cefdinir, multiple courses cipro, nitrofurantoin, sulfamethoxazole/trimethoprim.  -Most recent gabapentin Rx last filled 10/29/19 800mg x 2 tablets three times daily - had bottle dated 9/24/19 with belongings. Also had bottle of gabapentin 600mg - 2 tab TID from 12/4/17 about 1/4 full and had bottle gabapentin 600mg - 2 tab QID from 5/29/18 nearly full.  -Most recent methadone filled 12/22/19 #70 for 14 day supply - take 2 tab (20mg) in morning, 1 tab (10mg) in afternoon, 2 tab (20mg) in evening.  -Duloxetine Rx appears to be 60mg BID but handwriting on bottle indicated she takes 60mg x 2 in morning.  -Simvastatin last fill 12/23/19 for 90DS, tizanidine 12/18/19 for 30DS, trazodone 12/8/19 for 30DS - none of these bottles with personnel belongings.  -Ranitidine bottle with belongings dated 12/11/17 for 10 day supply - still with pills in it.  -In [employer] there was entry for Ketamine 40mg gillian #10 for 10 day supply on 11/6/19 - unsure if or how patient using.  -ASA, phenazopyridine, nebulizer solution, inhaler not with belongings so unclear at this time if these are currently being used/taken.    Medication reconciliation completed by provider prior to medication history? No    Time spent in this activity: 45  min      Prior to Admission medications    Medication Sig Last Dose Taking? Auth Provider   DULoxetine (CYMBALTA) 60 MG capsule Take 120 mg by mouth every morning Unknown at AM Yes Unknown, Entered By History   gabapentin (NEURONTIN) 800 MG tablet Take 1,600 mg by mouth 3 times daily  Yes Unknown, Entered By History   hydrOXYzine (ATARAX) 50 MG tablet Take 50 mg by mouth 2 times daily as needed for anxiety or other (insomnia) Unknown Yes Unknown, Entered By History   levothyroxine (SYNTHROID/LEVOTHROID) 88 MCG tablet TAKE 1 TABLET(88 MCG) BY MOUTH DAILY Unknown at AM Yes Shawnee Dueñas DO   lisinopril (PRINIVIL/ZESTRIL) 5 MG tablet TAKE 1 TABLET(5 MG) BY MOUTH DAILY Unknown at AM Yes Skylar Moore MD   metFORMIN (GLUCOPHAGE-XR) 500 MG 24 hr tablet TAKE 1 TABLET BY MOUTH TWICE DAILY WITH MEALS Unknown Yes Skylar Moore MD   methadone (DOLOPHINE) 10 MG tablet Take 10 mg by mouth daily at 2 pm Per Rx bottle take 2 tablets (20mg) in morning, 1 tablet (10mg) in afternoon, 2 tablets (20mg) in evening. Unknown at afternoon Yes Unknown, Entered By History   methadone (DOLOPHINE) 10 MG tablet Take 20 mg by mouth 2 times daily Per Rx bottle take 2 tablets (20mg) in morning, 1 tablet (10mg) in afternoon, 2 tablets (20mg) in evening. Unknown Yes Shawnee Dueñas DO   multivitamin (CENTRUM SILVER) tablet Take 1 tablet by mouth daily Unknown Yes Unknown, Entered By History   oxybutynin ER (DITROPAN XL) 15 MG 24 hr tablet Take 15 mg by mouth daily Unknown at AM Yes Unknown, Entered By History   ranitidine (ZANTAC) 150 MG tablet Take 150 mg by mouth 2 times daily as needed for heartburn Unknown Yes Unknown, Entered By History   senna-docusate (SENOKOT-S;PERICOLACE) 8.6-50 MG per tablet Take 2 tablets by mouth 2 times daily Unknown Yes Rosa Summers APRN CNP   albuterol (PROVENTIL) (2.5 MG/3ML) 0.083% neb solution Take 1 vial (2.5 mg) by nebulization every 4 hours as needed for shortness of  breath / dyspnea or wheezing Unknown at Unknown time  Mike Weiner MD   aspirin 81 MG tablet Take 81 mg by mouth daily  Unknown at Unknown time  Ekta Gaffney MD   blood glucose (NO BRAND SPECIFIED) lancets standard Use to test blood sugar 1 times daily or as directed.   Shawnee Dueñas DO   blood glucose monitoring (FREESTYLE FREEDOM LITE) meter device kit Use to test blood sugar.   Shawnee Dueñas DO   blood glucose monitoring (NO BRAND SPECIFIED) meter device kit Dispense freestyle zak glucose meter   Shawnee Dueñas DO   blood glucose monitoring (NO BRAND SPECIFIED) test strip Use to test blood sugars 1 times daily or as directed   Shawnee Dueñas DO   levalbuterol (XOPENEX) 1.25 MG/3ML neb solution TAKE ONE VIAL BY NEBULIZATION EVERY 8 HOURS AS NEEDED FOR SHORTNESS OF BREATH/DYSPNEA OR WHEEZING Unknown at Unknown time  Mike Weiner MD   ondansetron (ZOFRAN ODT) 4 MG ODT tab Take 1-2 tablets (4-8 mg) by mouth every 8 hours as needed for nausea Unknown at Unknown time  Shawnee Dueñas DO   order for DME Equipment being ordered: Nebulizer   Shawnee Dueñas DO   order for DME Equipment being ordered: BIPAP.   15/5, Rate of 12, 2L O2 to keep sats 90-95%.   Chloe Kenny APRN CNP   order for DME Equipment being ordered: Nebulizer   Oscar, Skylar Arredondo MD   order for DME Equipment being ordered: Hospital Bed, total electric (head, foot, and height adjustments), with any type side rails, with mattress   Shawnee Dueñas DO   order for DME Equipment being ordered: Motorized Wheelchair   Shawnee Dueñas DO   order for DME Equipment being ordered: Trapeze bar for hospital bed   Shawnee Dueñas DO   Phenazopyridine HCl (PYRIDIUM PO) Take 2 tablets by mouth 3 times daily as needed Dose unknown Unknown at Unknown time  Reported, Patient   simvastatin (ZOCOR) 20 MG tablet TAKE 1 TABLET(20 MG) BY MOUTH AT BEDTIME Unknown at Unknown time  Shawnee Dueñas DO    tiZANidine (ZANAFLEX) 4 MG tablet Take 1 tablet (4 mg) by mouth 3 times daily Unknown at Unknown time  Ekta Gaffney MD   VENTOLIN  (90 Base) MCG/ACT inhaler INHALE 2 PUFFS INTO THE LUNGS EVERY 6 HOURS AS NEEDED FOR SHORTNESS OF BREATH Unknown at Unknown time  Skylar Moore MD   Trazodone 100mg tablet  - Take 1 tablet by mouth daily. Unknown last administration.

## 2019-12-30 ENCOUNTER — APPOINTMENT (OUTPATIENT)
Dept: GENERAL RADIOLOGY | Facility: CLINIC | Age: 64
DRG: 853 | End: 2019-12-30
Attending: SURGERY
Payer: MEDICARE

## 2019-12-30 ENCOUNTER — ANESTHESIA (OUTPATIENT)
Dept: SURGERY | Facility: CLINIC | Age: 64
DRG: 853 | End: 2019-12-30
Payer: MEDICARE

## 2019-12-30 ENCOUNTER — APPOINTMENT (OUTPATIENT)
Dept: CT IMAGING | Facility: CLINIC | Age: 64
DRG: 853 | End: 2019-12-30
Attending: SURGERY
Payer: MEDICARE

## 2019-12-30 ENCOUNTER — ANESTHESIA EVENT (OUTPATIENT)
Dept: SURGERY | Facility: CLINIC | Age: 64
DRG: 853 | End: 2019-12-30
Payer: MEDICARE

## 2019-12-30 PROBLEM — A41.9 SEPTIC SHOCK (H): Status: ACTIVE | Noted: 2019-12-30

## 2019-12-30 PROBLEM — J96.01 ACUTE RESPIRATORY FAILURE WITH HYPOXIA (H): Status: ACTIVE | Noted: 2019-12-30

## 2019-12-30 PROBLEM — R65.21 SEPTIC SHOCK (H): Status: ACTIVE | Noted: 2019-12-30

## 2019-12-30 PROBLEM — I21.4 NSTEMI (NON-ST ELEVATED MYOCARDIAL INFARCTION) (H): Status: ACTIVE | Noted: 2019-12-30

## 2019-12-30 LAB
ALBUMIN SERPL-MCNC: 2.3 G/DL (ref 3.4–5)
ALP SERPL-CCNC: 94 U/L (ref 40–150)
ALT SERPL W P-5'-P-CCNC: 1363 U/L (ref 0–50)
ANION GAP SERPL CALCULATED.3IONS-SCNC: 6 MMOL/L (ref 3–14)
AST SERPL W P-5'-P-CCNC: 1044 U/L (ref 0–45)
BASE EXCESS BLDA CALC-SCNC: 3.1 MMOL/L
BASE EXCESS BLDV CALC-SCNC: 0.6 MMOL/L
BASOPHILS # BLD AUTO: 0 10E9/L (ref 0–0.2)
BASOPHILS NFR BLD AUTO: 0.2 %
BILIRUB SERPL-MCNC: 1.5 MG/DL (ref 0.2–1.3)
BUN SERPL-MCNC: 69 MG/DL (ref 7–30)
CALCIUM SERPL-MCNC: 8.1 MG/DL (ref 8.5–10.1)
CHLORIDE SERPL-SCNC: 111 MMOL/L (ref 94–109)
CO2 SERPL-SCNC: 28 MMOL/L (ref 20–32)
CREAT SERPL-MCNC: 2.02 MG/DL (ref 0.52–1.04)
DIFFERENTIAL METHOD BLD: ABNORMAL
EOSINOPHIL # BLD AUTO: 0 10E9/L (ref 0–0.7)
EOSINOPHIL NFR BLD AUTO: 0 %
ERYTHROCYTE [DISTWIDTH] IN BLOOD BY AUTOMATED COUNT: 16.7 % (ref 10–15)
ERYTHROCYTE [DISTWIDTH] IN BLOOD BY AUTOMATED COUNT: 16.8 % (ref 10–15)
GFR SERPL CREATININE-BSD FRML MDRD: 26 ML/MIN/{1.73_M2}
GLUCOSE BLDC GLUCOMTR-MCNC: 168 MG/DL (ref 70–99)
GLUCOSE BLDC GLUCOMTR-MCNC: 179 MG/DL (ref 70–99)
GLUCOSE BLDC GLUCOMTR-MCNC: 206 MG/DL (ref 70–99)
GLUCOSE BLDC GLUCOMTR-MCNC: 215 MG/DL (ref 70–99)
GLUCOSE BLDC GLUCOMTR-MCNC: 215 MG/DL (ref 70–99)
GLUCOSE BLDC GLUCOMTR-MCNC: 249 MG/DL (ref 70–99)
GLUCOSE SERPL-MCNC: 194 MG/DL (ref 70–99)
HCO3 BLD-SCNC: 28 MMOL/L (ref 21–28)
HCO3 BLDV-SCNC: 26 MMOL/L (ref 21–28)
HCT VFR BLD AUTO: 57.2 % (ref 35–47)
HCT VFR BLD AUTO: 58.9 % (ref 35–47)
HGB BLD-MCNC: 18.4 G/DL (ref 11.7–15.7)
HGB BLD-MCNC: 19 G/DL (ref 11.7–15.7)
IMM GRANULOCYTES # BLD: 0.1 10E9/L (ref 0–0.4)
IMM GRANULOCYTES NFR BLD: 0.5 %
LMWH PPP CHRO-ACNC: 0.21 IU/ML
LMWH PPP CHRO-ACNC: NORMAL IU/ML
LYMPHOCYTES # BLD AUTO: 1.5 10E9/L (ref 0.8–5.3)
LYMPHOCYTES NFR BLD AUTO: 7.9 %
MAGNESIUM SERPL-MCNC: 2.2 MG/DL (ref 1.6–2.3)
MCH RBC QN AUTO: 28.5 PG (ref 26.5–33)
MCH RBC QN AUTO: 28.8 PG (ref 26.5–33)
MCHC RBC AUTO-ENTMCNC: 32.2 G/DL (ref 31.5–36.5)
MCHC RBC AUTO-ENTMCNC: 32.3 G/DL (ref 31.5–36.5)
MCV RBC AUTO: 89 FL (ref 78–100)
MCV RBC AUTO: 89 FL (ref 78–100)
MONOCYTES # BLD AUTO: 1.3 10E9/L (ref 0–1.3)
MONOCYTES NFR BLD AUTO: 6.8 %
MRSA DNA SPEC QL NAA+PROBE: POSITIVE
NEUTROPHILS # BLD AUTO: 15.9 10E9/L (ref 1.6–8.3)
NEUTROPHILS NFR BLD AUTO: 84.6 %
NRBC # BLD AUTO: 0.3 10*3/UL
NRBC BLD AUTO-RTO: 2 /100
O2/TOTAL GAS SETTING VFR VENT: 100 %
OXYHGB MFR BLD: 98 % (ref 92–100)
OXYHGB MFR BLDV: 73 %
PCO2 BLD: 41 MM HG (ref 35–45)
PCO2 BLDV: 42 MM HG (ref 40–50)
PH BLD: 7.44 PH (ref 7.35–7.45)
PH BLDV: 7.4 PH (ref 7.32–7.43)
PLATELET # BLD AUTO: 246 10E9/L (ref 150–450)
PLATELET # BLD AUTO: 286 10E9/L (ref 150–450)
PO2 BLD: 120 MM HG (ref 80–105)
PO2 BLDV: 40 MM HG (ref 25–47)
POTASSIUM SERPL-SCNC: 4.7 MMOL/L (ref 3.4–5.3)
PROCALCITONIN SERPL-MCNC: 10.12 NG/ML
PROT SERPL-MCNC: 5.7 G/DL (ref 6.8–8.8)
RBC # BLD AUTO: 6.45 10E12/L (ref 3.8–5.2)
RBC # BLD AUTO: 6.6 10E12/L (ref 3.8–5.2)
SODIUM SERPL-SCNC: 145 MMOL/L (ref 133–144)
SPECIMEN SOURCE: ABNORMAL
TROPONIN I SERPL-MCNC: 12.1 UG/L (ref 0–0.04)
WBC # BLD AUTO: 18.8 10E9/L (ref 4–11)
WBC # BLD AUTO: 19.4 10E9/L (ref 4–11)

## 2019-12-30 PROCEDURE — 27210794 ZZH OR GENERAL SUPPLY STERILE: Performed by: SURGERY

## 2019-12-30 PROCEDURE — 85520 HEPARIN ASSAY: CPT | Performed by: INTERNAL MEDICINE

## 2019-12-30 PROCEDURE — 94003 VENT MGMT INPAT SUBQ DAY: CPT

## 2019-12-30 PROCEDURE — 82805 BLOOD GASES W/O2 SATURATION: CPT | Performed by: SURGERY

## 2019-12-30 PROCEDURE — 82805 BLOOD GASES W/O2 SATURATION: CPT | Performed by: INTERNAL MEDICINE

## 2019-12-30 PROCEDURE — 83735 ASSAY OF MAGNESIUM: CPT | Performed by: SURGERY

## 2019-12-30 PROCEDURE — P9041 ALBUMIN (HUMAN),5%, 50ML: HCPCS | Performed by: NURSE ANESTHETIST, CERTIFIED REGISTERED

## 2019-12-30 PROCEDURE — 40000275 ZZH STATISTIC RCP TIME EA 10 MIN

## 2019-12-30 PROCEDURE — 25800030 ZZH RX IP 258 OP 636: Performed by: INTERNAL MEDICINE

## 2019-12-30 PROCEDURE — 82803 BLOOD GASES ANY COMBINATION: CPT

## 2019-12-30 PROCEDURE — 25800030 ZZH RX IP 258 OP 636: Performed by: SURGERY

## 2019-12-30 PROCEDURE — 25000128 H RX IP 250 OP 636

## 2019-12-30 PROCEDURE — 85520 HEPARIN ASSAY: CPT | Performed by: SURGERY

## 2019-12-30 PROCEDURE — 84132 ASSAY OF SERUM POTASSIUM: CPT

## 2019-12-30 PROCEDURE — P9041 ALBUMIN (HUMAN),5%, 50ML: HCPCS

## 2019-12-30 PROCEDURE — 25000128 H RX IP 250 OP 636: Performed by: SURGERY

## 2019-12-30 PROCEDURE — 25800030 ZZH RX IP 258 OP 636: Performed by: NURSE ANESTHETIST, CERTIFIED REGISTERED

## 2019-12-30 PROCEDURE — 25000128 H RX IP 250 OP 636: Performed by: INTERNAL MEDICINE

## 2019-12-30 PROCEDURE — 71045 X-RAY EXAM CHEST 1 VIEW: CPT

## 2019-12-30 PROCEDURE — 36000058 ZZH SURGERY LEVEL 3 EA 15 ADDTL MIN: Performed by: SURGERY

## 2019-12-30 PROCEDURE — 40000008 ZZH STATISTIC AIRWAY CARE

## 2019-12-30 PROCEDURE — 37000009 ZZH ANESTHESIA TECHNICAL FEE, EACH ADDTL 15 MIN: Performed by: SURGERY

## 2019-12-30 PROCEDURE — 20000003 ZZH R&B ICU

## 2019-12-30 PROCEDURE — 93005 ELECTROCARDIOGRAM TRACING: CPT

## 2019-12-30 PROCEDURE — 87641 MR-STAPH DNA AMP PROBE: CPT | Performed by: INTERNAL MEDICINE

## 2019-12-30 PROCEDURE — 44603 SUTURE SMALL INTESTINE: CPT | Mod: AS | Performed by: PHYSICIAN ASSISTANT

## 2019-12-30 PROCEDURE — 99232 SBSQ HOSP IP/OBS MODERATE 35: CPT | Mod: 57 | Performed by: SURGERY

## 2019-12-30 PROCEDURE — 84484 ASSAY OF TROPONIN QUANT: CPT | Performed by: SURGERY

## 2019-12-30 PROCEDURE — 25000125 ZZHC RX 250: Performed by: SURGERY

## 2019-12-30 PROCEDURE — 74176 CT ABD & PELVIS W/O CONTRAST: CPT

## 2019-12-30 PROCEDURE — 84145 PROCALCITONIN (PCT): CPT | Performed by: INTERNAL MEDICINE

## 2019-12-30 PROCEDURE — 44603 SUTURE SMALL INTESTINE: CPT | Mod: 22 | Performed by: SURGERY

## 2019-12-30 PROCEDURE — 84295 ASSAY OF SERUM SODIUM: CPT

## 2019-12-30 PROCEDURE — 99291 CRITICAL CARE FIRST HOUR: CPT | Mod: 24 | Performed by: INTERNAL MEDICINE

## 2019-12-30 PROCEDURE — 36600 WITHDRAWAL OF ARTERIAL BLOOD: CPT

## 2019-12-30 PROCEDURE — 36000056 ZZH SURGERY LEVEL 3 1ST 30 MIN: Performed by: SURGERY

## 2019-12-30 PROCEDURE — 40000281 ZZH STATISTIC TRANSPORT TIME EA 15 MIN

## 2019-12-30 PROCEDURE — 37000008 ZZH ANESTHESIA TECHNICAL FEE, 1ST 30 MIN: Performed by: SURGERY

## 2019-12-30 PROCEDURE — 00000146 ZZHCL STATISTIC GLUCOSE BY METER IP

## 2019-12-30 PROCEDURE — 93010 ELECTROCARDIOGRAM REPORT: CPT | Performed by: INTERNAL MEDICINE

## 2019-12-30 PROCEDURE — 80053 COMPREHEN METABOLIC PANEL: CPT | Performed by: INTERNAL MEDICINE

## 2019-12-30 PROCEDURE — 85014 HEMATOCRIT: CPT

## 2019-12-30 PROCEDURE — 85027 COMPLETE CBC AUTOMATED: CPT | Performed by: SURGERY

## 2019-12-30 PROCEDURE — 25000566 ZZH SEVOFLURANE, EA 15 MIN: Performed by: SURGERY

## 2019-12-30 PROCEDURE — 25000128 H RX IP 250 OP 636: Performed by: NURSE ANESTHETIST, CERTIFIED REGISTERED

## 2019-12-30 PROCEDURE — 25000125 ZZHC RX 250: Performed by: INTERNAL MEDICINE

## 2019-12-30 PROCEDURE — 25800025 ZZH RX 258: Performed by: SURGERY

## 2019-12-30 PROCEDURE — 87186 SC STD MICRODIL/AGAR DIL: CPT | Performed by: INTERNAL MEDICINE

## 2019-12-30 PROCEDURE — 97605 NEG PRS WND THER DME<=50SQCM: CPT | Mod: 51 | Performed by: SURGERY

## 2019-12-30 PROCEDURE — 25000125 ZZHC RX 250: Performed by: NURSE ANESTHETIST, CERTIFIED REGISTERED

## 2019-12-30 PROCEDURE — 85025 COMPLETE CBC W/AUTO DIFF WBC: CPT | Performed by: INTERNAL MEDICINE

## 2019-12-30 PROCEDURE — 87640 STAPH A DNA AMP PROBE: CPT | Performed by: INTERNAL MEDICINE

## 2019-12-30 PROCEDURE — 99291 CRITICAL CARE FIRST HOUR: CPT | Performed by: INTERNAL MEDICINE

## 2019-12-30 PROCEDURE — C9113 INJ PANTOPRAZOLE SODIUM, VIA: HCPCS | Performed by: INTERNAL MEDICINE

## 2019-12-30 PROCEDURE — 25000132 ZZH RX MED GY IP 250 OP 250 PS 637: Mod: GY | Performed by: INTERNAL MEDICINE

## 2019-12-30 RX ORDER — LIDOCAINE HYDROCHLORIDE 10 MG/ML
INJECTION, SOLUTION EPIDURAL; INFILTRATION; INTRACAUDAL; PERINEURAL
Status: DISCONTINUED
Start: 2019-12-30 | End: 2019-12-30 | Stop reason: HOSPADM

## 2019-12-30 RX ORDER — MAGNESIUM HYDROXIDE 1200 MG/15ML
LIQUID ORAL PRN
Status: DISCONTINUED | OUTPATIENT
Start: 2019-12-30 | End: 2019-12-30 | Stop reason: HOSPADM

## 2019-12-30 RX ORDER — FLUCONAZOLE 2 MG/ML
200 INJECTION, SOLUTION INTRAVENOUS EVERY 24 HOURS
Status: DISCONTINUED | OUTPATIENT
Start: 2019-12-30 | End: 2020-01-03

## 2019-12-30 RX ORDER — PHENYLEPHRINE HCL IN 0.9% NACL 50MG/250ML
0.5-6 PLASTIC BAG, INJECTION (ML) INTRAVENOUS CONTINUOUS
Status: DISCONTINUED | OUTPATIENT
Start: 2019-12-30 | End: 2020-01-01

## 2019-12-30 RX ORDER — ALBUMIN, HUMAN INJ 5% 5 %
SOLUTION INTRAVENOUS CONTINUOUS PRN
Status: DISCONTINUED | OUTPATIENT
Start: 2019-12-30 | End: 2019-12-30

## 2019-12-30 RX ORDER — MEROPENEM 1 G/1
1 INJECTION, POWDER, FOR SOLUTION INTRAVENOUS EVERY 8 HOURS
Status: DISCONTINUED | OUTPATIENT
Start: 2019-12-30 | End: 2020-01-06

## 2019-12-30 RX ORDER — KETAMINE HYDROCHLORIDE 10 MG/ML
10 INJECTION, SOLUTION INTRAMUSCULAR; INTRAVENOUS
Status: DISCONTINUED | OUTPATIENT
Start: 2019-12-30 | End: 2020-01-04 | Stop reason: DRUGHIGH

## 2019-12-30 RX ORDER — ALBUMIN, HUMAN INJ 5% 5 %
50 SOLUTION INTRAVENOUS ONCE
Status: COMPLETED | OUTPATIENT
Start: 2019-12-30 | End: 2019-12-30

## 2019-12-30 RX ORDER — BUPIVACAINE HYDROCHLORIDE AND EPINEPHRINE 2.5; 5 MG/ML; UG/ML
INJECTION, SOLUTION EPIDURAL; INFILTRATION; INTRACAUDAL; PERINEURAL
Status: DISCONTINUED
Start: 2019-12-30 | End: 2019-12-30 | Stop reason: HOSPADM

## 2019-12-30 RX ORDER — ALBUMIN, HUMAN INJ 5% 5 %
SOLUTION INTRAVENOUS
Status: COMPLETED
Start: 2019-12-30 | End: 2019-12-30

## 2019-12-30 RX ORDER — SODIUM CHLORIDE, SODIUM LACTATE, POTASSIUM CHLORIDE, CALCIUM CHLORIDE 600; 310; 30; 20 MG/100ML; MG/100ML; MG/100ML; MG/100ML
INJECTION, SOLUTION INTRAVENOUS CONTINUOUS PRN
Status: DISCONTINUED | OUTPATIENT
Start: 2019-12-30 | End: 2019-12-30

## 2019-12-30 RX ADMIN — ALBUMIN HUMAN: 0.05 INJECTION, SOLUTION INTRAVENOUS at 13:48

## 2019-12-30 RX ADMIN — SODIUM CHLORIDE: 9 INJECTION, SOLUTION INTRAVENOUS at 20:30

## 2019-12-30 RX ADMIN — PROPOFOL 30 MCG/KG/MIN: 10 INJECTION, EMULSION INTRAVENOUS at 15:58

## 2019-12-30 RX ADMIN — THIAMINE HYDROCHLORIDE 200 MG: 100 INJECTION, SOLUTION INTRAMUSCULAR; INTRAVENOUS at 11:12

## 2019-12-30 RX ADMIN — SODIUM CHLORIDE: 9 INJECTION, SOLUTION INTRAVENOUS at 07:38

## 2019-12-30 RX ADMIN — PHENYLEPHRINE HYDROCHLORIDE 0.5 MCG/KG/MIN: 10 INJECTION INTRAVENOUS at 08:14

## 2019-12-30 RX ADMIN — Medication 0.6 MCG/KG/MIN: at 10:01

## 2019-12-30 RX ADMIN — INSULIN ASPART 1 UNITS: 100 INJECTION, SOLUTION INTRAVENOUS; SUBCUTANEOUS at 04:19

## 2019-12-30 RX ADMIN — ASCORBIC ACID 1500 MG: 500 INJECTION, SOLUTION INTRAMUSCULAR; INTRAVENOUS; SUBCUTANEOUS at 11:04

## 2019-12-30 RX ADMIN — CHLORHEXIDINE GLUCONATE 15 ML: 1.2 RINSE ORAL at 19:52

## 2019-12-30 RX ADMIN — VANCOMYCIN HYDROCHLORIDE 2500 MG: 5 INJECTION, POWDER, LYOPHILIZED, FOR SOLUTION INTRAVENOUS at 11:09

## 2019-12-30 RX ADMIN — INSULIN ASPART 2 UNITS: 100 INJECTION, SOLUTION INTRAVENOUS; SUBCUTANEOUS at 16:26

## 2019-12-30 RX ADMIN — Medication 100 MCG/HR: at 21:28

## 2019-12-30 RX ADMIN — INSULIN ASPART 2 UNITS: 100 INJECTION, SOLUTION INTRAVENOUS; SUBCUTANEOUS at 02:44

## 2019-12-30 RX ADMIN — ALBUMIN HUMAN 50 G: 0.05 INJECTION, SOLUTION INTRAVENOUS at 10:03

## 2019-12-30 RX ADMIN — CISATRACURIUM BESYLATE 10 MG: 2 INJECTION INTRAVENOUS at 13:31

## 2019-12-30 RX ADMIN — PROPOFOL 30 MCG/KG/MIN: 10 INJECTION, EMULSION INTRAVENOUS at 23:02

## 2019-12-30 RX ADMIN — ANGIOTENSIN II 20 NG/KG/MIN: 2.5 INJECTION INTRAVENOUS at 11:50

## 2019-12-30 RX ADMIN — CHLORHEXIDINE GLUCONATE 15 ML: 1.2 RINSE ORAL at 08:46

## 2019-12-30 RX ADMIN — HYDROCORTISONE SODIUM SUCCINATE 100 MG: 100 INJECTION, POWDER, FOR SOLUTION INTRAMUSCULAR; INTRAVENOUS at 16:04

## 2019-12-30 RX ADMIN — Medication 0.18 MCG/KG/MIN: at 16:48

## 2019-12-30 RX ADMIN — ALBUMIN HUMAN: 0.05 INJECTION, SOLUTION INTRAVENOUS at 13:17

## 2019-12-30 RX ADMIN — PROPOFOL 30 MCG/KG/MIN: 10 INJECTION, EMULSION INTRAVENOUS at 18:59

## 2019-12-30 RX ADMIN — INSULIN ASPART 3 UNITS: 100 INJECTION, SOLUTION INTRAVENOUS; SUBCUTANEOUS at 19:48

## 2019-12-30 RX ADMIN — SODIUM CHLORIDE, POTASSIUM CHLORIDE, SODIUM LACTATE AND CALCIUM CHLORIDE 1000 ML: 600; 310; 30; 20 INJECTION, SOLUTION INTRAVENOUS at 11:34

## 2019-12-30 RX ADMIN — INSULIN ASPART 1 UNITS: 100 INJECTION, SOLUTION INTRAVENOUS; SUBCUTANEOUS at 08:52

## 2019-12-30 RX ADMIN — ASCORBIC ACID 1500 MG: 500 INJECTION, SOLUTION INTRAMUSCULAR; INTRAVENOUS; SUBCUTANEOUS at 19:50

## 2019-12-30 RX ADMIN — PROPOFOL 30 MCG/KG/MIN: 10 INJECTION, EMULSION INTRAVENOUS at 11:50

## 2019-12-30 RX ADMIN — HYDROCORTISONE SODIUM SUCCINATE 100 MG: 100 INJECTION, POWDER, FOR SOLUTION INTRAMUSCULAR; INTRAVENOUS at 07:55

## 2019-12-30 RX ADMIN — CISATRACURIUM BESYLATE 10 MG: 2 INJECTION INTRAVENOUS at 12:07

## 2019-12-30 RX ADMIN — MEROPENEM 1 G: 1 INJECTION, POWDER, FOR SOLUTION INTRAVENOUS at 23:40

## 2019-12-30 RX ADMIN — PIPERACILLIN SODIUM AND TAZOBACTAM SODIUM 4.5 G: 4; .5 INJECTION, POWDER, LYOPHILIZED, FOR SOLUTION INTRAVENOUS at 04:49

## 2019-12-30 RX ADMIN — PROPOFOL 30 MCG/KG/MIN: 10 INJECTION, EMULSION INTRAVENOUS at 06:34

## 2019-12-30 RX ADMIN — CISATRACURIUM BESYLATE 10 MG: 2 INJECTION INTRAVENOUS at 12:51

## 2019-12-30 RX ADMIN — PROPOFOL 20 MCG/KG/MIN: 10 INJECTION, EMULSION INTRAVENOUS at 02:44

## 2019-12-30 RX ADMIN — SODIUM CHLORIDE, POTASSIUM CHLORIDE, SODIUM LACTATE AND CALCIUM CHLORIDE: 600; 310; 30; 20 INJECTION, SOLUTION INTRAVENOUS at 12:15

## 2019-12-30 RX ADMIN — Medication 0.03 MCG/KG/MIN: at 04:03

## 2019-12-30 RX ADMIN — MEROPENEM 1 G: 1 INJECTION, POWDER, FOR SOLUTION INTRAVENOUS at 10:23

## 2019-12-30 RX ADMIN — ACETAMINOPHEN 650 MG: 325 TABLET, FILM COATED ORAL at 02:44

## 2019-12-30 RX ADMIN — ANGIOTENSIN II 20 NG/KG/MIN: 2.5 INJECTION INTRAVENOUS at 14:56

## 2019-12-30 RX ADMIN — VASOPRESSIN 2.4 UNITS/HR: 20 INJECTION INTRAVENOUS at 06:42

## 2019-12-30 RX ADMIN — ANGIOTENSIN II 20 NG/KG/MIN: 2.5 INJECTION INTRAVENOUS at 14:49

## 2019-12-30 RX ADMIN — MEROPENEM 1 G: 1 INJECTION, POWDER, FOR SOLUTION INTRAVENOUS at 16:03

## 2019-12-30 RX ADMIN — CISATRACURIUM BESYLATE 10 MG: 2 INJECTION INTRAVENOUS at 14:02

## 2019-12-30 RX ADMIN — HYDROCORTISONE SODIUM SUCCINATE 100 MG: 100 INJECTION, POWDER, FOR SOLUTION INTRAMUSCULAR; INTRAVENOUS at 23:43

## 2019-12-30 RX ADMIN — PANTOPRAZOLE SODIUM 40 MG: 40 INJECTION, POWDER, FOR SOLUTION INTRAVENOUS at 10:57

## 2019-12-30 RX ADMIN — FLUCONAZOLE 200 MG: 2 INJECTION, SOLUTION INTRAVENOUS at 11:15

## 2019-12-30 RX ADMIN — ALBUMIN HUMAN 50 G: 50 SOLUTION INTRAVENOUS at 10:03

## 2019-12-30 RX ADMIN — SODIUM CHLORIDE: 9 INJECTION, SOLUTION INTRAVENOUS at 22:14

## 2019-12-30 RX ADMIN — ANGIOTENSIN II 20 NG/KG/MIN: 2.5 INJECTION INTRAVENOUS at 14:45

## 2019-12-30 RX ADMIN — VASOPRESSIN 2.4 UNITS/HR: 20 INJECTION INTRAVENOUS at 16:14

## 2019-12-30 RX ADMIN — INSULIN ASPART 3 UNITS: 100 INJECTION, SOLUTION INTRAVENOUS; SUBCUTANEOUS at 23:50

## 2019-12-30 ASSESSMENT — ACTIVITIES OF DAILY LIVING (ADL)
ADLS_ACUITY_SCORE: 25
ADLS_ACUITY_SCORE: 21
ADLS_ACUITY_SCORE: 25
ADLS_ACUITY_SCORE: 21
ADLS_ACUITY_SCORE: 25

## 2019-12-30 NOTE — PROGRESS NOTES
Pt transported from ED to ICU vented, vital signs was stable, no complication. RT will continue to follow.

## 2019-12-30 NOTE — PLAN OF CARE
Pt had precipitous drop in blood pressure around 0300.  Levophed started at that time.  Dr. Nj at bedside and started arterial line.  Pt's family, spouse Sergio, updated over the phone to decline.  Bedside handoff given.

## 2019-12-30 NOTE — ANESTHESIA POSTPROCEDURE EVALUATION
Patient: Bhavani White    Procedure(s):  EXPLORATORY LAPAROSCOPY, LAPAROTOMY, REPAIR OF ULCER PERFORATION, WOUND VAC PLACEMENT    Diagnosis:* No pre-op diagnosis entered *  Diagnosis Additional Information: No value filed.    Anesthesia Type:  General, ETT    Note:  Anesthesia Post Evaluation    Patient location during evaluation: ICU  Patient participation: Unable to evaluate secondary to administered sedation  Pain management: adequate  Airway patency: patent  Cardiovascular status: hemodynamically stable  Respiratory status: ventilator and ETT  Hydration status: acceptable  PONV: none     Anesthetic complications: None          Last vitals:  Vitals:    12/30/19 1115 12/30/19 1130 12/30/19 1145   BP:  114/80    Pulse:  128    Resp:      Temp:  39.6  C (103.2  F)    SpO2: 95% 98% 98%         Electronically Signed By: Allie Cherry MD, MD  December 30, 2019  2:49 PM

## 2019-12-30 NOTE — PROGRESS NOTES
Assessment and Plan:     1. Septic shock requiring 3 pressors at maximum dosages.  2.  Perforated duodenal ulcer with peritonitis and persistent leaking by CT scan  3.  Acute myocardial infarction, with initial troponin of 19, falling to 12 and stabilizing.  4.  Severe cardiomyopathy, etiology unclear.  This may be due to sepsis, stress cardiomyopathy, or ischemic cardiomyopathy.  I believe left ventricular ejection fraction is about 25%.  At the time of the echocardiogram last night, the IVC was nearly flat and the RV was small and underfilled.  Since then, volume resuscitation has been performed, but I see no significant issues with congestive heart failure at this time.  5.  Acute kidney injury, worsening quickly.  Creatinine rising from 1.4 to 2.0  6.  Acute hepatic injury, worsening.  7.  Respiratory failure requiring mechanical ventilation  8.  Chronic pain syndrome treated with methadone, currently on fentanyl IV infusion.    Recommendations:    Discussed with ICU and general surgery physicians.  I have recommended discontinuation of IV heparin, which likely carries greater risk than benefit in this situation, although hemoglobin appears to be stable so far.  I would also discontinue aspirin as this will likely prevent the healing of the duodenal ulcer.  I suspect that this is not an acute coronary syndrome with plaque rupture that would benefit from anticoagulation and antiplatelet therapy.  Instead, this troponin elevation is more likely caused by a stress cardiomyopathy or demand infarction due by extreme cardiac stress in the presence of pre-existing, stable coronary artery disease.     Repeat ECG shows sinus tachycardia with a right bundle branch block pattern and septal Q waves but no acute ischemic changes.    I would not recommend cardiac catheterization due to high risk of the procedure and no clear benefit.     Due to the extremely high risk situation of perforated duodenal ulcer with  worsening septic shock, renal failure, and hepatic failure, I would recommend proceeding with surgical treatment of the perforated ulcer.  This would likely be the most expedient way to reduce cardiac stress and prevent further myocardial infarction.  The risk of surgery is high due to the presence of severe cardiomyopathy, potentially causing acute congestive heart failure, arrhythmia, increased infarction, or death, but I believe that the risk of not doing surgery carries a higher risk.    At this time there is no further treatment that I would recommend to help improve her cardiac status.  I would avoid diuresis at this time and focus on maintaining appropriate blood pressures.    TORI REYES MD     Critical care time 45 minutes.        Interval History:   Intubated and sedated          Medications:       ascorbic acid intermittent infusion  1,500 mg Intravenous Q6H     aspirin  325 mg Oral Daily     chlorhexidine  15 mL Mouth/Throat Q12H     diatrizoate meglumine-sodium  5 mL Oral Once     fluconazole  200 mg Intravenous Q24H     hydrocortisone sodium succinate PF  100 mg Intravenous Q8H     insulin aspart  1-6 Units Subcutaneous Q4H     meropenem  1 g Intravenous Q8H     pantoprazole (PROTONIX) IV  40 mg Intravenous Daily     sodium chloride (PF)  3 mL Intracatheter Q8H     thiamine  200 mg Intravenous Daily     vancomycin (VANCOCIN) IV  2,500 mg Intravenous Once     vancomycin place ortega - receiving intermittent dosing  1 each Intravenous See Admin Instructions            Physical Exam:   Blood pressure (!) 128/29, pulse 141, temperature 103.2  F (39.6  C), temperature source Oral, resp. rate 16, weight 138.8 kg (306 lb), SpO2 (!) 87 %, not currently breastfeeding.    Vitals:    12/29/19 1428 12/30/19 0345   Weight: 140 kg (308 lb 10.3 oz) 138.8 kg (306 lb)         Intake/Output Summary (Last 24 hours) at 12/30/2019 1034  Last data filed at 12/30/2019 0600  Gross per 24 hour   Intake 3246.55 ml    Output 1975 ml   Net 1271.55 ml       12/25 0700 - 12/30 0659  In: 3246.55 [I.V.:1146.55]  Out: 1975 [Urine:1225]  Net: 1271.55    Exam:  GENERAL APPEARANCE ADULT: Intubated and sedated, grimaces to abdominal palpation, diaphoretic  RESP: diminished breath sounds  CV: regular rate and rhythm, no murmur, rub, or gallop  ABDOMEN: Grimaces with palpation, no bowel sounds heard  EXTREMITIES: No edema         Data:   LABS (Last four results)  CMP  Recent Labs   Lab 12/30/19  0800 12/30/19  0420 12/29/19 2130 12/29/19  1443 12/29/19  1436   *  --  146*  --  143   POTASSIUM 4.7  --  3.9  --  4.2   CHLORIDE 111*  --  109  --  106   CO2 28  --  28  --  28   ANIONGAP 6  --  9  --  9   *  --  189*  --  207*   BUN 69*  --  60*  --  65*   CR 2.02*  --  1.40*  --  1.56*   GFRESTIMATED 26*  --  40* 33* 34*   GFRESTBLACK 30*  --  46* 39* 40*   DORY 8.1*  --  8.6  --  9.8   MAG  --  2.2 2.1  --   --    PHOS  --   --  2.4*  --   --    PROTTOTAL 5.7*  --  5.8*  --  6.8   ALBUMIN 2.3*  --  2.5*  --  2.9*   BILITOTAL 1.5*  --  1.0  --  1.1   ALKPHOS 94  --  97  --  108   AST 1,044*  --  567*  --  715*   ALT 1,363*  --  738*  --  743*     CBC  Recent Labs   Lab 12/30/19  0800 12/30/19  0420 12/29/19 2130 12/29/19  1436   WBC 18.8* 19.4* 23.5* 20.5*   RBC 6.60* 6.45* 6.47* 6.64*   HGB 19.0* 18.4* 17.8* 19.2*   HCT 58.9* 57.2* 55.2* 57.9*   MCV 89 89 85 87   MCH 28.8 28.5 27.5 28.9   MCHC 32.3 32.2 32.2 33.2   RDW 16.8* 16.7* 18.3* 15.8*    246 289 285     INR  Recent Labs   Lab 12/29/19  1535 12/29/19  1436   INR 1.61* Canceled, Test credited     TROPONINS   Lab Results   Component Value Date    TROPI 12.097 () 12/30/2019    TROPI 12.385 () 12/29/2019    TROPI 19.402 () 12/29/2019    TROPI <0.015 12/10/2017    TROPI  07/26/2017     <0.015  The 99th percentile for upper reference range is 0.045 ug/L.  Troponin values in   the range of 0.045 - 0.120 ug/L may be associated with risks of adverse   clinical  events.                                                                                                                 TORI REYES MD

## 2019-12-30 NOTE — CONSULTS
Cardiology Consult Note-- SEE DICTATION    Bhavani White MRN#: [id number]   YOB: 1955 Age: 64 year old     Date of Admission: 2019  Consult indication: elevated troponin, abnormal ECG         HPI and Assessment and Recommendations/Plan:      Please see dictation.     - discussed case with ICU physician and consulting General surgeon, I am in agreement that unfortunately there are limited therapeutic options at this time   - agree with heparin gtt, ASA 325mg once and 81mg daily if able  - coronary angiography +/- PCI would be high risk at this time in the setting of a perforated viscus, hopefully will tolerate heparin and ASA, may be a candidate for at least a diagnostic coronary angiogram in the coming days  - avoid aggressive IVF as able given the high likelihood of cardiac dysfunction  - TTE pending, would not be unexpected if there are wall motion abnormalities and reduced LVEF given the degree of troponin elevation  - recommend avoiding QT prolonging medications due to methadone administration  - cardiology will continue to follow      Thank you for allowing our team to participate in the care of Bhavani White.  Please do not hesitate to page me with any questions or concerns.     Marquis Celaya MD  Cardiology  Pager:  [phone]  Text Page   2019         Past Medical History:   I have reviewed this patient's past medical history  Past Medical History:   Diagnosis Date     Anxiety      Asthma      Depression      DM (diabetes mellitus) (H)      Frequent UTI      History of blood transfusion      History of ulcer disease 2014    She notes from NSAIDs; nearly . Discussed a hospitalization at [place] for this.     Hypertension      Hypothyroid      Iatrogenic Cushing's disease (H)     off prednisone now     Morbid obesity (H)      MRSA (methicillin resistant staph aureus) culture positive 2012    repeat cx 10/12/2012 no staph mentioned.     Osteoarthritis      multiple joings.     Other chronic pain      Sleep apnea     No longer uses cpap.             Past Surgical History:   I have reviewed this patient's past surgical history   Past Surgical History:   Procedure Laterality Date     ABDOMEN SURGERY       C LAMINECTOMY,FACETECTOMY,LUMBAR  1982     C TOTAL KNEE ARTHROPLASTY  1999    left     CYSTOSCOPY N/A 9/21/2018    Procedure: CYSTOSCOPY;  Cystoscopy and Botox Injection Into the Bladder and suprapubic tube exchange;  Surgeon: Yoandy Rios MD;  [facility]     CYSTOSCOPY N/A 3/15/2019    Procedure: Cystoscopy, suprapubic tube exchange;  Surgeon: Yoandy Rios MD;  [facility]: [facility]     CYSTOSCOPY FLEXIBLE, CYSTOSTOMY, INSERT TUBE SUPRAPUBIC, COMBINED N/A 11/1/2019    Procedure: Cystoscopy, Bladder Botox Injection, Suprapubic Tube Change;  Surgeon: Yoandy Rios MD;  [facility]     CYSTOSCOPY, INTRAVESICAL INJECTION N/A 8/23/2017    Procedure: CYSTOSCOPY, INTRAVESICAL INJECTION;  Cystoscopy, Botox Injection Into The Bladder  Latex Allergy ;  Surgeon: Yoandy Rios MD;  [facility]     CYSTOSCOPY, INTRAVESICAL INJECTION N/A 3/8/2018    Procedure: CYSTOSCOPY, INTRAVESICAL INJECTION;  Cystoscopy, Botox Injection Into The Bladder and suprapubic catheter exchange;  Surgeon: Yoandy Rios MD;  [facility]     DISCECTOMY, FUSION CERVICAL ANTERIOR THREE+ LEVELS, COMBINED Left 5/3/2016    Procedure: COMBINED DISCECTOMY, FUSION CERVICAL ANTERIOR THREE+ LEVELS;  Surgeon: Jasvir Torres MD;  Location: [facility]     GENITOURINARY SURGERY      suprapubic catheter     HERNIA REPAIR  1957    double hernia     HYSTERECTOMY, PAP NO LONGER INDICATED      CELIA and large ovarian tumor removed     INJECT BOTOX N/A 9/21/2018    Procedure: INJECT BOTOX;;  Surgeon: Yoandy Rios MD;  [facility]     INJECT BOTOX N/A 3/15/2019    Procedure: Inject Botox into Bladder;  Surgeon: Yoandy Rios MD;  [facility]  "    SURGICAL HISTORY OF -       left cataract surgery     SURGICAL HISTORY OF -   12/14    right cataract surgery and left laser revision     SURGICAL HISTORY OF -   March 2015    left carpal tunnel release     TONSILLECTOMY  1974             Social History:   I have reviewed this patient's social history  Social History     Tobacco Use     Smoking status: Never Smoker     Smokeless tobacco: Never Used   Substance Use Topics     Alcohol use: No     Frequency: Never     Drinks per session: Patient refused     Binge frequency: Patient refused             Family History:   I have reviewed this patient's family history  Family History   Problem Relation Age of Onset     Depression Son      Hypertension Mother      Heart Disease Mother         bypass     Depression Mother      Hypertension Father      Connective Tissue Disorder Father         ankylosing spondylitis     Cancer Father         colon; prostate     Other Cancer Father         bladder cancer     Depression Sister              Allergies:   I have reviewed this patient's allergy history  Allergies   Allergen Reactions     Povidone Iodine      \"mild skin irritation\" per patient report     Toradol [Ketorolac] Other (See Comments)     Pt gets nightmares     Tramadol Other (See Comments)             Medications reviewed:   Prior to admission medications:  Prior to Admission medications    Medication Sig Start Date End Date Taking? Authorizing Provider   DULoxetine (CYMBALTA) 60 MG capsule Take 120 mg by mouth every morning   Yes Unknown, Entered By History   gabapentin (NEURONTIN) 800 MG tablet Take 1,600 mg by mouth 3 times daily   Yes Unknown, Entered By History   hydrOXYzine (ATARAX) 50 MG tablet Take 50 mg by mouth 2 times daily as needed for anxiety or other (insomnia)   Yes Unknown, Entered By History   levothyroxine (SYNTHROID/LEVOTHROID) 88 MCG tablet TAKE 1 TABLET(88 MCG) BY MOUTH DAILY 9/16/19  Yes Shawnee Dueñas, DO   lisinopril (PRINIVIL/ZESTRIL) 5 " MG tablet TAKE 1 TABLET(5 MG) BY MOUTH DAILY 11/25/19  Yes Skylar Moore MD   metFORMIN (GLUCOPHAGE-XR) 500 MG 24 hr tablet TAKE 1 TABLET BY MOUTH TWICE DAILY WITH MEALS 10/28/19  Yes Skylar Moore MD   methadone (DOLOPHINE) 10 MG tablet Take 10 mg by mouth daily at 2 pm Per Rx bottle take 2 tablets (20mg) in morning, 1 tablet (10mg) in afternoon, 2 tablets (20mg) in evening.   Yes Unknown, Entered By History   methadone (DOLOPHINE) 10 MG tablet Take 20 mg by mouth 2 times daily Per Rx bottle take 2 tablets (20mg) in morning, 1 tablet (10mg) in afternoon, 2 tablets (20mg) in evening. 6/1/17  Yes Shawnee Dueñas DO   multivitamin (CENTRUM SILVER) tablet Take 1 tablet by mouth daily   Yes Unknown, Entered By History   oxybutynin ER (DITROPAN XL) 15 MG 24 hr tablet Take 15 mg by mouth daily   Yes Unknown, Entered By History   ranitidine (ZANTAC) 150 MG tablet Take 150 mg by mouth 2 times daily as needed for heartburn   Yes Unknown, Entered By History   senna-docusate (SENOKOT-S;PERICOLACE) 8.6-50 MG per tablet Take 2 tablets by mouth 2 times daily 5/19/16  Yes Rosa Summers APRN CNP   albuterol (PROVENTIL) (2.5 MG/3ML) 0.083% neb solution Take 1 vial (2.5 mg) by nebulization every 4 hours as needed for shortness of breath / dyspnea or wheezing 3/11/19   Mike Weiner MD   aspirin 81 MG tablet Take 81 mg by mouth daily  11/21/14   Ekta Gaffney MD   blood glucose (NO BRAND SPECIFIED) lancets standard Use to test blood sugar 1 times daily or as directed. 3/20/18   Shawnee Dueñas, DO   blood glucose monitoring (FREESTYLE FREEDOM LITE) meter device kit Use to test blood sugar. 1/17/19 1/17/20  Shawnee Dueñas DO   blood glucose monitoring (NO BRAND SPECIFIED) meter device kit Dispense freestyle zak glucose meter 1/16/19   Shawnee Dueñas, DO   blood glucose monitoring (NO BRAND SPECIFIED) test strip Use to test blood sugars 1 times daily or as directed 3/20/18    Shawnee Dueñas DO   levalbuterol (XOPENEX) 1.25 MG/3ML neb solution TAKE ONE VIAL BY NEBULIZATION EVERY 8 HOURS AS NEEDED FOR SHORTNESS OF BREATH/DYSPNEA OR WHEEZING 3/15/19   Mike Weiner MD   nitroFURantoin macrocrystal-monohydrate (MACROBID) 100 MG capsule Take 1 capsule (100 mg) by mouth 2 times daily for 5 days 12/17/19 12/22/19  Thu Babb MD   ondansetron (ZOFRAN ODT) 4 MG ODT tab Take 1-2 tablets (4-8 mg) by mouth every 8 hours as needed for nausea 12/14/17   Shawnee Dueñas DO   order for DME Equipment being ordered: Nebulizer 1/15/18   Shawnee Dueñas DO   order for DME Equipment being ordered: BIPAP.   15/5, Rate of 12, 2L O2 to keep sats 90-95%. 5/19/16   Chloe Kenny APRN CNP   order for DME Equipment being ordered: Nebulizer 1/29/16   Skylar Moore MD   order for DME Equipment being ordered: Hospital Bed, total electric (head, foot, and height adjustments), with any type side rails, with mattress 1/14/16   Shawnee Dueñas DO   order for DME Equipment being ordered: Motorized Wheelchair 1/14/16   Shawnee Dueñas DO   order for DME Equipment being ordered: Trapeze bar for hospital bed 1/14/16   Shawnee Dueñas DO   Phenazopyridine HCl (PYRIDIUM PO) Take 2 tablets by mouth 3 times daily as needed Dose unknown    Reported, Patient   simvastatin (ZOCOR) 20 MG tablet TAKE 1 TABLET(20 MG) BY MOUTH AT BEDTIME 12/23/19   Shawnee Dueñas DO   sulfamethoxazole-trimethoprim (BACTRIM DS/SEPTRA DS) 800-160 MG tablet Take 1 tablet by mouth 2 times daily for 5 days 12/17/19 12/22/19  Thu Babb MD   tiZANidine (ZANAFLEX) 4 MG tablet Take 1 tablet (4 mg) by mouth 3 times daily 1/13/15   Ekta Gaffney MD   traZODone (DESYREL) 100 MG tablet Take 100 mg by mouth At Bedtime    Unknown, Entered By History   VENTOLIN  (90 Base) MCG/ACT inhaler INHALE 2 PUFFS INTO THE LUNGS EVERY 6 HOURS AS NEEDED FOR SHORTNESS OF BREATH 12/3/19    Skylar Moore MD      Current medications:  Current Facility-Administered Medications Ordered in Epic   Medication Dose Route Frequency Last Rate Last Dose     acetaminophen (TYLENOL) Suppository 650 mg  650 mg Rectal Q4H PRN         acetaminophen (TYLENOL) tablet 650 mg  650 mg Oral Q4H PRN         albuterol (PROVENTIL) neb solution 2.5 mg  2.5 mg Nebulization Q2H PRN         alum & mag hydroxide-simethicone (MYLANTA ES/MAALOX  ES) suspension 30 mL  30 mL Oral Q4H PRN         aspirin (ASA) chewable tablet 324 mg  324 mg Oral Once         [START ON 12/30/2019] aspirin (ASA) tablet 325 mg  325 mg Oral Daily         chlorhexidine (PERIDEX) 0.12 % solution 15 mL  15 mL Mouth/Throat Q12H         glucose gel 15-30 g  15-30 g Oral Q15 Min PRN        Or     dextrose 50 % injection 25-50 mL  25-50 mL Intravenous Q15 Min PRN        Or     glucagon injection 1 mg  1 mg Subcutaneous Q15 Min PRN         fentaNYL (PF) (SUBLIMAZE) injection  mcg   mcg Intravenous Q1H PRN         fentaNYL (SUBLIMAZE) infusion   mcg/hr Intravenous Continuous         heparin 25,000 units in 0.45% NaCl 250 mL ANTICOAGULANT  infusion  1,050 Units/hr Intravenous Continuous         insulin aspart (NovoLOG) inj (RAPID ACTING)  1-6 Units Subcutaneous Q4H         lidocaine (LMX4) cream   Topical Q1H PRN         lidocaine 1 % 0.1-1 mL  0.1-1 mL Other Q1H PRN         magnesium sulfate 2 g in water intermittent infusion  2 g Intravenous Daily PRN         magnesium sulfate 4 g in 100 mL sterile water (premade)  4 g Intravenous Q4H PRN         medication instruction   Does not apply Continuous PRN         naloxone (NARCAN) injection 0.1-0.4 mg  0.1-0.4 mg Intravenous Q2 Min PRN         norepinephrine (LEVOPHED) 16 mg in  mL infusion  0.03-0.4 mcg/kg/min Intravenous Continuous         ondansetron (ZOFRAN-ODT) ODT tab 4 mg  4 mg Oral Q6H PRN        Or     ondansetron (ZOFRAN) injection 4 mg  4 mg Intravenous Q6H PRN          pantoprazole (PROTONIX) 40 mg IV push injection  40 mg Intravenous Daily         Patient is already receiving mechanical prophylaxis   Does not apply Continuous PRN         piperacillin-tazobactam (ZOSYN) 3.375 g vial to attach to  mL bag  3.375 g Intravenous Q6H         potassium chloride (KLOR-CON) Packet 20-40 mEq  20-40 mEq Oral or Feeding Tube Q2H PRN         potassium chloride 10 mEq in 100 mL intermittent infusion with 10 mg lidocaine  10 mEq Intravenous Q1H PRN         potassium chloride 10 mEq in 100 mL sterile water intermittent infusion (premix)  10 mEq Intravenous Q1H PRN         potassium chloride 20 mEq in 50 mL intermittent infusion  20 mEq Intravenous Q1H PRN         potassium chloride ER (K-DUR/KLOR-CON M) CR tablet 20-40 mEq  20-40 mEq Oral Q2H PRN         Reason ACE/ARB/ARNI order not selected   Other DOES NOT GO TO MAR         Reason beta blocker not prescribed   Does not apply DOES NOT GO TO MAR         Reason statin not prescribed   Other DOES NOT GO TO MAR         senna-docusate (SENOKOT-S/PERICOLACE) 8.6-50 MG per tablet 1 tablet  1 tablet Oral BID PRN        Or     senna-docusate (SENOKOT-S/PERICOLACE) 8.6-50 MG per tablet 2 tablet  2 tablet Oral BID PRN         sodium chloride (PF) 0.9% PF flush 3 mL  3 mL Intracatheter q1 min prn         sodium chloride (PF) 0.9% PF flush 3 mL  3 mL Intracatheter Q8H         No current [facility]-ordered outpatient medications on file.             Review of Systems:   A complete review of systems was performed and was negative except as mentioned in the HPI.          Physical Exam:   Vital signs were personally reviewed:  Temperatures:  Current - Temp: 100.7  F (38.2  C); Max - Temp  Av.7  F (38.2  C)  Min: 100.7  F (38.2  C)  Max: 100.7  F (38.2  C)  Respiration range: Resp  Av.1  Min: 9  Max: 40  Pulse range: Pulse  Av.6  Min: 88  Max: 116  Blood pressure range: Systolic (24hrs), Av , Min:94 , Max:136   ; Diastolic (24hrs), Av,  Min:51, Max:86    Pulse oximetry range: SpO2  Av.2 %  Min: 40 %  Max: 98 %    Intake/Output Summary (Last 24 hours) at 2019  Last data filed at 2019 1759  Gross per 24 hour   Intake 2100 ml   Output 650 ml   Net 1450 ml     308 lbs 10.3 oz  Body mass index is 52.16 kg/m .   Body surface area is 2.52 meters squared.    Constitutional: intubated, sedated, morbidly obese, central line in right neck  Eyes: sclera anicteric, conjunctiva normal, no lesions on eyelids or lashes  ENT: normocephalic, without obvious abnormality, atraumatic, external ears without lesions    Pulmonary: minimally coarse bilat, mechanical breath sounds  Cardiovascular: distant heart sounds but RRR  Gastrointestinal: obese, ventral hernias, midline abdominal surgical scar, non-rigid  Neurologic: intubated, sedated  Skin:  nails normal without discoloration or clubbing, no jaundice         Laboratory tests:   Laboratory tests personally reviewed:   CMP  Recent Labs   Lab 19  1443 19  1436   NA  --  143   POTASSIUM  --  4.2   CHLORIDE  --  106   CO2  --  28   ANIONGAP  --  9   GLC  --  207*   BUN  --  65*   CR  --  1.56*   GFRESTIMATED 33* 35*   GFRESTBLACK 39* 41*   DORY  --  9.8   PROTTOTAL  --  6.8   ALBUMIN  --  2.9*   BILITOTAL  --  1.1   ALKPHOS  --  108   AST  --  715*   ALT  --  743*     CBC  Recent Labs   Lab 19  1436   WBC 20.5*   RBC 6.64*   HGB 19.2*   HCT 57.9*   MCV 87   MCH 28.9   MCHC 33.2   RDW 15.8*        INR  Recent Labs   Lab 19  1535 19  1436   INR 1.61* Canceled, Test credited     Lab Results   Component Value Date    TROPI 19.402 (HH) 2019    TROPI <0.015 12/10/2017    TROPI  2017     <0.015  The 99th percentile for upper reference range is 0.045 ug/L.  Troponin values in   the range of 0.045 - 0.120 ug/L may be associated with risks of adverse   clinical events.       Recent Labs   Lab Test 19  1121 19  1050  14  1345  13   CHOL  201* 158   < > 133  --  156   HDL 60 52   < > 58  --  43   LDL 96 70   < > 32   < > 75   TRIG 225* 180*   < > 216*  --  192*   CHOLHDLRATIO  --   --   --  2.3  --  3.63    < > = values in this interval not displayed.     Lab Results   Component Value Date    A1C 7.3 10/28/2019    A1C 7.2 05/21/2019    A1C 6.4 01/29/2019    A1C 6.0 08/15/2018    A1C 6.8 02/15/2018     TSH   Date Value Ref Range Status   10/28/2019 0.86 0.40 - 4.00 mU/L Final            Imaging and Additional Data:     Recent Results (from the past 24 hour(s))   XR Chest Port 1 View    Narrative    CHEST PORTABLE ONE VIEW   12/29/2019 2:45 PM     HISTORY: Shortness of breath.    COMPARISON: Chest x-ray on 4/26/2019      Impression    IMPRESSION: Single portable AP view of the chest was obtained.  Endotracheal tube tip projects over mid thoracic trachea approximately  4 cm from the . Enteric tube crosses the diaphragm with the  distal tip outside the field of view. Right IJ central catheter  projects over high SVC. Although the image is mildly rotated, there is  apparent left-to-right midline shift, which could be due to  atelectatic changes of the right lung, recommend repeating the exam  with better positioning. Small bilateral pleural effusions. No  significant pneumothorax.    OCTAVIO MARTIN MD   CT Head w/o Contrast    Narrative    CT SCAN OF THE HEAD WITHOUT CONTRAST   12/29/2019 4:29 PM     HISTORY: Altered vomiting.    TECHNIQUE:  Axial images of the head and coronal reformations without  IV contrast material. Radiation dose for this scan was reduced using  automated exposure control, adjustment of the mA and/or kV according  to patient size, or iterative reconstruction technique.    COMPARISON: Head CT 7/26/2017.    FINDINGS:  Mildly limited exam due to artifact from large left-sided earring.  Mild volume loss is present. The cerebral hemispheres, brainstem, and  cerebellum otherwise demonstrate normal morphology and attenuation.  No  evidence of acute ischemia, hemorrhage, mass, mass effect or  hydrocephalus. The visualized calvarium, tympanic cavities, mastoid  cavities, and paranasal sinuses are unremarkable.      Impression    IMPRESSION:   No acute intracranial abnormality.    TORI REID MD   CT Chest (PE) Abdomen Pelvis w Contrast    Narrative    CT CHEST PE ABDOMEN AND PELVIS WITH CONTRAST, 12/29/2019 4:29 PM    TECHNIQUE:  Helical CT images from the thoracic inlet through the  symphysis pubis were obtained  with contrast. Contrast dose: 135 mL  [id number]    COMPARISON: CT abdomen and pelvis on 4/26/2019.    HISTORY: Feculent vomit, SOB, altered.    FINDINGS:    Chest/mediastinum: Endotracheal tube tip is in the mid thoracic  trachea. No evidence of pulmonary embolism. No cardiomegaly or  pericardial effusion. Moderate atherosclerotic vascular calcification  of the coronary arteries and thoracic aorta. The main pulmonary artery  is mildly enlarged measuring 3.3 cm, this can be seen with pulmonary  hypertension.    Lung/pleura: Small bilateral pleural effusions, left larger than right  with associated basilar atelectasis/consolidation. A few patchy areas  of groundglass pulmonary opacities in the right middle lobe,  indeterminant, could be infectious.    Abdomen and pelvis: Heterogenous enhancement of the liver. Gallbladder  is distended, otherwise unremarkable. No splenomegaly. No adrenal  nodules. Diffuse fatty replacement of the pancreas. No evidence of  kidney stones or hydronephrosis. Bilateral renal cysts.    No abnormally dilated bowel loops. Large amount of stool throughout  the colon. The tip of the enteric tube significantly stretched the  anterior wall of the pylorus of the stomach. There is significant fat  stranding along the pylorus and anterior aspect of the duodenum with  possible duodenal perforation. Small focus of gas along the lateral  aspect of the gastric pylorus/duodenum (series 8 image 72),  likely  represents perforated peptic ulcer. Moderate predominantly  aortobiiliac atherosclerotic vascular calcification.    Bones and soft tissues: Multilevel degenerative changes of the  visualized spine. Multiple fat-containing ventral hernias.      Impression    IMPRESSION:   1. Significant fat stranding along the anterior aspect of the pylorus  and duodenum, with small adjacent focus of extraluminal gas, findings  likely represent peptic ulcer with possible contained perforation.  2. The tip of enteric tube significantly stretched the anterior wall  of the pylorus.  3. No evidence of pulmonary embolism.  4. The main pulmonary artery is enlarged measuring 3.3 cm, this can be  seen with pulmonary hypertension.  5. Small bilateral pleural effusions, left larger than right with  associated basilar atelectasis/consolidation.  6. A few patchy areas of groundglass pulmonary opacities in the right  middle lobe, indeterminate, could be infectious.  7. Heterogenous enhancement of the liver of indeterminate etiology and  significance.    OCTAVIO MARTIN MD

## 2019-12-30 NOTE — PROGRESS NOTES
[facility] ICU RESPIRATORY NOTE        Date of Admission: 12/29/2019    Date of Intubation (most recent): 12/29/19    Reason for Mechanical Ventilation: Respiratory failure    Number of Days on Mechanical Ventilation: 2    Met Criteria for Spontaneous Breathing Trial: No    Reason for No Spontaneous Breathing Trial: Per MD    Significant Events Today: patient taken to the OR    ABG Results:   Recent Labs   Lab 12/30/19  0045   PH 7.44   PCO2 41   PO2 120*   HCO3 28   O2PER 100       Current Vent Settings: Ventilation Mode: CMV/AC  (Continuous Mandatory Ventilation/ Assist Control)  FiO2 (%): 100 %  Rate Set (breaths/minute): 16 breaths/min  Tidal Volume Set (mL): 500 mL  PEEP (cm H2O): 10 cmH2O  Oxygen Concentration (%): 100 %  Resp: 16      Plan: Continue full ventilatory support.    Adilene Conner, RT

## 2019-12-30 NOTE — CONSULTS
[facility]  General Surgery Consultation         Ayan Lopez MD    Bhavani White MRN# [id number]   YOB: 1955 Age: 64 year old      Date of Admission:  12/29/2019  Date of Consult: 12/29/2019         Assessment and Plan:   Patient is a 64 year old female with probable contained duodenal perforation    PLAN:  I have personally reviewed all imaging studies.  Her CT shows significant inflammation and a small bubble of air around her pylorus/duodenum.  This is compatible with a probable contained perforation.  There is no free fluid or other findings to suggest a uncontained perforation at this time.  Her NG tube was also pushing through this area and was withdrawn on examination.  She has multiple other medical issues including an elevated troponin and respiratory failure.  She is a very poor candidate for surgical intervention and would benefit from nonoperative management if possible.  I would recommend continued ICU care with antibiotics and NG drainage.  If she fails to respond to medical management could consider contrast study to see if the perforation is not contained. I would reserve surgery as the last resort as she would have a very high morbidity given issue noted above. The acute care team will follow.        Chief Complaint:     Chief Complaint   Patient presents with     Altered Mental Status          History of Present Illness:   Patient is a 64 year old female who I was asked to see by Dr Pierson for evaluation of possible duodenal perforation.  The patient was brought to the hospital with altered mental status.  Her family states she has not been feeling well for the last few days and has not been taking her medications including methadone.  He states that she was confused for at least the last 12 hours.  She was brought to the hospital per EMS who stated she was vomiting dark content.  She developed significant respiratory distress and was intubated in the  emergency room.  She had a thorough work-up in the emergency room and was found to have significant inflammation of her pylorus/duodenum with a possible contained perforation due to a small amount of air on the external surface.  There was no other intra-abdominal findings to suggest a uncontained perforation.  She was also found to have a significantly elevated troponin with EKG changes.  She is being transferred to the ICU for ongoing care.        Physical Exam:   Blood pressure 100/70, pulse 88, temperature 100.7  F (38.2  C), temperature source Temporal, resp. rate 16, weight 140 kg (308 lb 10.3 oz), SpO2 91 %, not currently breastfeeding.  308 lbs 10.3 oz  General: Intubated and sedated  Psych: Withdrawals from pain.  Neurological: grossly intact  Eyes: Sclera clear  Respiratory:  Lungs with good air excursion  Cardiovascular:  Normal peripheral pulses  GI: Abdomen Obese. Large midline incision with multiple palpable hernias.No significant response to deep palpation. Suprapubic catheter in place.   Lymphatic/Hematologic/Immune:  No obvious lymphadenopathy.  Integumentary:  No rashes       Past Medical History:     Past Medical History:   Diagnosis Date     Anxiety      Asthma      Depression      DM (diabetes mellitus) (H)      Frequent UTI      History of blood transfusion      History of ulcer disease 2014    She notes from NSAIDs; nearly . Discussed a hospitalization at [facility] for this.     Hypertension      Hypothyroid      Iatrogenic Cushing's disease (H)     off prednisone now     Morbid obesity (H)      MRSA (methicillin resistant staph aureus) culture positive 2012    repeat cx 10/12/2012 no staph mentioned.     Osteoarthritis     multiple joings.     Other chronic pain      Sleep apnea     No longer uses cpap.          Past Surgical History:     Past Surgical History:   Procedure Laterality Date     ABDOMEN SURGERY       C LAMINECTOMY,FACETECTOMY,LUMBAR  1982     C TOTAL KNEE  ARTHROPLASTY  1999    left     CYSTOSCOPY N/A 9/21/2018    Procedure: CYSTOSCOPY;  Cystoscopy and Botox Injection Into the Bladder and suprapubic tube exchange;  Surgeon: Yoandy Rios MD;  [facility]: [facility]     CYSTOSCOPY N/A 3/15/2019    Procedure: Cystoscopy, suprapubic tube exchange;  Surgeon: Yoandy Rios MD;  Location: [facility]     CYSTOSCOPY FLEXIBLE, CYSTOSTOMY, INSERT TUBE SUPRAPUBIC, COMBINED N/A 11/1/2019    Procedure: Cystoscopy, Bladder Botox Injection, Suprapubic Tube Change;  Surgeon: Yoandy Rios MD;  [facility]     CYSTOSCOPY, INTRAVESICAL INJECTION N/A 8/23/2017    Procedure: CYSTOSCOPY, INTRAVESICAL INJECTION;  Cystoscopy, Botox Injection Into The Bladder  Latex Allergy ;  Surgeon: Yoandy Rios MD;  Location: [facility] OR     CYSTOSCOPY, INTRAVESICAL INJECTION N/A 3/8/2018    Procedure: CYSTOSCOPY, INTRAVESICAL INJECTION;  Cystoscopy, Botox Injection Into The Bladder and suprapubic catheter exchange;  Surgeon: Yoandy Rios MD;  Location: [facility]     DISCECTOMY, FUSION CERVICAL ANTERIOR THREE+ LEVELS, COMBINED Left 5/3/2016    Procedure: COMBINED DISCECTOMY, FUSION CERVICAL ANTERIOR THREE+ LEVELS;  Surgeon: Jasvir Torres MD;  Location: [facility] OR     GENITOURINARY SURGERY      suprapubic catheter     HERNIA REPAIR  1957    double hernia     HYSTERECTOMY, PAP NO LONGER INDICATED      CELIA and large ovarian tumor removed     INJECT BOTOX N/A 9/21/2018    Procedure: INJECT BOTOX;;  Surgeon: Yoandy Rios MD;  [facility]     INJECT BOTOX N/A 3/15/2019    Procedure: Inject Botox into Bladder;  Surgeon: Yoandy Rios MD;  [facility]: [facility]     SURGICAL HISTORY OF -       left cataract surgery     SURGICAL HISTORY OF -   12/14    right cataract surgery and left laser revision     SURGICAL HISTORY OF -   March 2015    left carpal tunnel release     TONSILLECTOMY  1974          Current Medications:                Home Medications:     Prior to  Admission medications    Medication Sig Last Dose Taking? Auth Provider   DULoxetine (CYMBALTA) 60 MG capsule Take 120 mg by mouth every morning Unknown at AM Yes Unknown, Entered By History   gabapentin (NEURONTIN) 800 MG tablet Take 1,600 mg by mouth 3 times daily  Yes Unknown, Entered By History   hydrOXYzine (ATARAX) 50 MG tablet Take 50 mg by mouth 2 times daily as needed for anxiety or other (insomnia) Unknown Yes Unknown, Entered By History   levothyroxine (SYNTHROID/LEVOTHROID) 88 MCG tablet TAKE 1 TABLET(88 MCG) BY MOUTH DAILY Unknown at AM Yes Shawnee Dueñas DO   lisinopril (PRINIVIL/ZESTRIL) 5 MG tablet TAKE 1 TABLET(5 MG) BY MOUTH DAILY Unknown at AM Yes Skylar Moore MD   metFORMIN (GLUCOPHAGE-XR) 500 MG 24 hr tablet TAKE 1 TABLET BY MOUTH TWICE DAILY WITH MEALS Unknown Yes Skylar Moore MD   methadone (DOLOPHINE) 10 MG tablet Take 10 mg by mouth daily at 2 pm Per Rx bottle take 2 tablets (20mg) in morning, 1 tablet (10mg) in afternoon, 2 tablets (20mg) in evening. Unknown at afternoon Yes Unknown, Entered By History   methadone (DOLOPHINE) 10 MG tablet Take 20 mg by mouth 2 times daily Per Rx bottle take 2 tablets (20mg) in morning, 1 tablet (10mg) in afternoon, 2 tablets (20mg) in evening. Unknown Yes Shawnee Dueñas DO   multivitamin (CENTRUM SILVER) tablet Take 1 tablet by mouth daily Unknown Yes Unknown, Entered By History   oxybutynin ER (DITROPAN XL) 15 MG 24 hr tablet Take 15 mg by mouth daily Unknown at AM Yes Unknown, Entered By History   ranitidine (ZANTAC) 150 MG tablet Take 150 mg by mouth 2 times daily as needed for heartburn Unknown Yes Unknown, Entered By History   senna-docusate (SENOKOT-S;PERICOLACE) 8.6-50 MG per tablet Take 2 tablets by mouth 2 times daily Unknown Yes Rosa Summers APRN CNP   albuterol (PROVENTIL) (2.5 MG/3ML) 0.083% neb solution Take 1 vial (2.5 mg) by nebulization every 4 hours as needed for shortness of breath / dyspnea  or wheezing Unknown at Unknown time  Mike Weiner MD   aspirin 81 MG tablet Take 81 mg by mouth daily  Unknown at Unknown time  Ekta Gaffney MD   blood glucose (NO BRAND SPECIFIED) lancets standard Use to test blood sugar 1 times daily or as directed.   Shawnee Dueñas DO   blood glucose monitoring (FREESTYLE FREEDOM LITE) meter device kit Use to test blood sugar.   Shawnee Dueñas DO   blood glucose monitoring (NO BRAND SPECIFIED) meter device kit Dispense freestyle zak glucose meter   Shawnee Dueñas DO   blood glucose monitoring (NO BRAND SPECIFIED) test strip Use to test blood sugars 1 times daily or as directed   Shawnee Dueñas DO   levalbuterol (XOPENEX) 1.25 MG/3ML neb solution TAKE ONE VIAL BY NEBULIZATION EVERY 8 HOURS AS NEEDED FOR SHORTNESS OF BREATH/DYSPNEA OR WHEEZING Unknown at Unknown time  Mike Weiner MD   ondansetron (ZOFRAN ODT) 4 MG ODT tab Take 1-2 tablets (4-8 mg) by mouth every 8 hours as needed for nausea Unknown at Unknown time  Shawnee Dueñas DO   order for DME Equipment being ordered: Nebulizer   Shawnee Dueñas DO   order for DME Equipment being ordered: BIPAP.   15/5, Rate of 12, 2L O2 to keep sats 90-95%.   Chloe Kenny APRN CNP   order for DME Equipment being ordered: Nebulizer   Skylar Moore MD   order for DME Equipment being ordered: Hospital Bed, total electric (head, foot, and height adjustments), with any type side rails, with mattress   Shawnee Dueñas DO   order for DME Equipment being ordered: Motorized Wheelchair   Shawnee Dueñas DO   order for DME Equipment being ordered: Trapeze bar for hospital bed   Shawnee Dueñas DO   Phenazopyridine HCl (PYRIDIUM PO) Take 2 tablets by mouth 3 times daily as needed Dose unknown Unknown at Unknown time  Reported, Patient   simvastatin (ZOCOR) 20 MG tablet TAKE 1 TABLET(20 MG) BY MOUTH AT BEDTIME Unknown at Unknown time  Shawnee Dueñas DO   tiZANidine  "(ZANAFLEX) 4 MG tablet Take 1 tablet (4 mg) by mouth 3 times daily Unknown at Unknown time  Ekta Gaffney MD   traZODone (DESYREL) 100 MG tablet Take 100 mg by mouth At Bedtime Unknown at Unknown time  Unknown, Entered By History   VENTOLIN  (90 Base) MCG/ACT inhaler INHALE 2 PUFFS INTO THE LUNGS EVERY 6 HOURS AS NEEDED FOR SHORTNESS OF BREATH Unknown at Unknown time  Skylar Moore MD          Allergies:     Allergies   Allergen Reactions     Povidone Iodine      \"mild skin irritation\" per patient report     Toradol [Ketorolac] Other (See Comments)     Pt gets nightmares     Tramadol Other (See Comments)          Family History:     Family History   Problem Relation Age of Onset     Depression Son      Hypertension Mother      Heart Disease Mother         bypass     Depression Mother      Hypertension Father      Connective Tissue Disorder Father         ankylosing spondylitis     Cancer Father         colon; prostate     Other Cancer Father         bladder cancer     Depression Sister          Social History:   Bhavani White  reports that she has never smoked. She has never used smokeless tobacco. She reports current drug use. Drug: Marijuana. She reports that she does not drink alcohol.        Review of Systems:   The 12 point Review of Systems is negative other than noted in the HPI.       Labs/Imaging   All new lab and imaging data was reviewed.   Ayan Lopez M.D.  [facility]    "

## 2019-12-30 NOTE — PROGRESS NOTES
Surgery    Events reviewed overnight.  Was hemodynamically stable until approximately 5 AM.  Had sudden drop in blood pressure and now requiring 3 pressors.  CT with contrast was obtained which shows more fluid but no obvious extravasation.  Cardiology has reviewed her echo which shows a moderate cardiomyopathy which is more likely stress related to ongoing sepsis versus acute cardiac injury.  She has responded well to fluid and albumin.  She remains intubated on 3 pressors at the time.    Abdomen-incision with multiple reducible hernias.  More fullness and grimacing with palpation.    A/P  Imaging reviewed.  Patient has become hemodynamically unstable likely related to worsening perforation and failure of medical management.  There is no extravasation but given overall sepsis picture she will require exploration with possible repair and drainage.  This is very high risk with significant morbidity/mortality given her overall medical issues and current hemodynamics.  Without any operation she would likely continue to decompensate and this will be her best attempt at reversing her current condition.  We attempted to call the  multiple times throughout the morning but could not get a hold of anyone from her family.  Consent will be declared as an emergency.     Ayan Lopez M.D.  [facility]  [phone]

## 2019-12-30 NOTE — PROVIDER NOTIFICATION
Critical procalcitonin called by lab at 0905. Notified dr lópez at 0915 of procalcitonin level, 10.12.

## 2019-12-30 NOTE — OP NOTE
Procedure Date: 12/29/2019      PREOPERATIVE DIAGNOSIS:  Perforated viscus.      POSTOPERATIVE DIAGNOSIS:  Perforation of duodenum with ischemia and GI bleed.        PROCEDURE:     1.  Exploratory laparoscopy with lysis of adhesions.   2.  Exploratory laparotomy with lysis of adhesions for 60 minutes.   3.  Placement of a Malecot drain in ischemic perforation of the first to second portion of the duodenum.   4.  Placement of ABThera abdominal wound VAC.      SURGEON:  Ayan Lopez MD.       ASSISTANT:  Bradford Porras MD, and Chika Ramirez PA-C.        The assistants were necessary due to the complexity of the case, need for intraoperative consultation, and complex retraction.        ANESTHESIA:  GET.       COMPLICATIONS:  None.        ESTIMATED BLOOD LOSS:  Old blood 1500 mL, new blood 50 mL.      FINDINGS:  Significant amount of clotted blood throughout the abdomen.  Exploration revealed bleeding from the duodenum, so conversion to open was performed.  This revealed ischemia of the distal stomach and duodenum with bleeding likely from the initiation of heparin.  The bleeding was controlled and there was a large ischemic perforation of the first to second portion of the duodenum.  The patient remained hemodynamically unstable on multiple pressors, so source control was obtained by placing a Malecot into the perforation and oversewing it.  All bleeding was controlled.  The wound VAC was then placed for transfer back to the Intensive Care Unit for resuscitation.      INDICATIONS:  This is a 64-year-old female who presented to the hospital yesterday with an altered mental status.  She was intubated and had a CT scan that showed a possible contained perforated duodenal ulcer without sings of a free uncontained perforation.  She was also found to have a troponin in the 19-20 range so it was unsure if this was related to a cardiac injury or stress from the sepsis.  After discussing with all of the consultants it  was decided to try a trial of non-operative management due to the perforation being possibly contained and ability to treat without surgery. She remained hemodynamically stable with medical management overnight, but unfortunately at approximately 5:00 this morning she required multiple pressors and became more unstable.  A repeat CT was performed which showed more fluid concerning for a worsening perforation.  Due to the unstable nature, the patient will require exploration.  Given her overall medical conditions and current status, this will be a very high risk procedure with a very high morbidity and mortality.  We tried to reach the  multiple times, but were unable to reach him prior to the operation.  This will be performed on emergency consent.        DESCRIPTION OF PROCEDURE:  The patient was already intubated.  She was brought to the operating room per Anesthesia.  She was placed in supine position.  She was prepped and draped in the usual fashion using ChloraPrep.  She had multiple midline incisions and likely had dense scar tissue.  An attempt at laparoscopy was performed to assess the area.  This was done by placing a small 5 mm trocar site in the left upper quadrant using an Optiview technique.  This was done without difficulty.  That was insufflated to a pressure of 12, which the patient tolerated.  I was able to find some intra-abdominal space which revealed a significant amount of old clotted blood.  I placed one additional 5 mm trocar and was able to suction the blood, and it appeared it was coming from the second or third portion of the duodenum.  At that point, lysis of adhesions was performed to clear a space to gain entry into the anterior abdominal wall.  This was done without any bleeding.   Conversion to open was then performed with cautery.  Retraction was applied and I was able to suction the rest of the blood.  I could not find any active bleeding.  There were dense adhesions that  required an additional 60 minutes of dissection and lysis of adhesions to expose the stomach and duodenum.  I opened the lesser sac which revealed old blood. The distal stomach had a necrotic rind with patchy areas of what appeared to be ischemia.  The pylorus was then palpated and I went distally and it revealed that there was more ischemia of the duodenum with a large rind of ischemic tissue surrounding it. The dissection was very difficult due to her body habitus and severe adhesions. Eventually, I was able to kocherize the duodenum which did reveal a large perforation with obvious ischemia around the perforation.  The tissue appeared necrotic and was flimsy and thin. There was also bleeding around the site.  I was able to contain the bleeding with a 3-0 Vicryl suture.  Cautery was used meticulously around the area, which also stopped the bleeding completely.   At that point, the patient did remain on multiple pressors so the decision was made for damage control surgery now that the source was found.  Since all bleeding was controlled, we elected to place a Malecot drain into the perforation to contain the bile spillage due to the location being at the second portion near the bile duct.  This was oversewn with multiple 3-0 Vicryls.  The duodenum definitely had a transition that appeared to have some ischemic comparative to the stomach and distal duodenum.  I was able to close the defect and tube but the tissue was very flimsy and did not hold the sutures well.  An additional Dwight drain was placed next to this area as well.  The abdomen was washed out with multiple liters of saline.  The ABThera wound VAC was then applied in the usual fashion.  The patient was then transferred back to the ICU.  All sponge, instrument and needle counts were correct. We will plan to take her back for re-exploration and definitive management once she is stabilized.        JONATHAN RICHTER MD             D: 12/30/2019   T:  2019   MT: WT      Name:     MICHELLE RUIZ   MRN:      [id number]        Account:        [id number]   :      1955           Procedure Date: 2019      Document: [id number]

## 2019-12-30 NOTE — PROGRESS NOTES
ICU Update     Updated patients  (contact number is patients cell) [phone].  Advised critically ill and in OR for repair and source control of infection but that patient is multi-organ failure and may not survive the night. Encouraged to come to hospital.     Hernan Jackson MD

## 2019-12-30 NOTE — PROVIDER NOTIFICATION
Critical from [facility] micro lab.  Blood cultures PCR growing Staph sp. NOT S. Aureus, S epidermidis, or S. Lugdenisis.  Further speciation to follow. Currently on vanco/merrem/diflucan.  Notified Susan RODRIGUEZ.

## 2019-12-30 NOTE — PROGRESS NOTES
Critical Care Progress Note      12/30/2019    Name: Bhavani White MRN#: [id number]   Age: 64 year old YOB: 1955     Hsptl Day# 1  ICU DAY #    MV DAY #1             Problem List:   Active Problems:    Elevated BMI    Perforated duodenal ulcer (H)    Acute respiratory failure with hypoxia (H)    NSTEMI (non-ST elevated myocardial infarction) (H)    Septic shock (H)           Summary/Hospital Course:     64 year old female with history of chronic pain on methadone, obesity, ANIYA, hypothyroidism, DM and prior abdominal surgery presented to ED this evening with altered mental status with abdominal pain and emesis discovered to have perforated gastric ulcer on imaging.      Per EMS notes patient not feeling well with 3 day prodrome not taking meds (methadone) with new altered mental status this evening prompting her  to call to 911.  Per EMS en route, patient vomited 'fecal material' with suspected aspiration. On arrival in ED, patient noted to be altered tachypneic, thrashing with cool, mottled skin and sats in 40% (?)  prompting immediate intubation for respiratory failure. Hemodynamics initially appropriate with MAP in 70s. EKG with some dynamic changes on initial tracing with new incomplete bundle branch block Labs notable for ED work notable for tropon ~20 , lactic acid 3 , ROLANDO 1.6 and acute hepatitis with LFTs in 700's.  WBC 20K, elevated Hgb 19. CT CAP demonstrated small effusions patchy GGO in RML consistent with aspiration, fat stranding around the pylorus and anterior duodenum suspicious for perforation as small amount of extraluminal gas.      TLC subsequently placed, zosyn started and 3L NS bolused. Surgery contacted in ED with recommendations for conservative management given unclear status of AMI, case also discussed with Cardiology       Assessment and plan :     Bhavani White IS a 64 year old female admitted on 12/29/2019 for abdominal sepsis 2/2 perforated duodenal ulcer,  elevated troponins/NSTEMI, acute hypoxemic respiratory failure requiring intubation.      I have personally reviewed the daily labs, imaging studies, cultures and discussed the case with referring physician and consulting physicians.      My assessment and plan by system for this patient is as follows:     Neurology/Psychiatry:   1. Encephalopathy - metabolic, septic   2. Pain/analgesia, chronic, on chronic methadone,   3. Anxiety 2/2 acute illness  Plan  - fentanyl gtt,   - propofol gtt   - goal RASS -2      Cardiovascular:   1. Shock, mixed septic, cardiogenic - now on two pressors, picture evolving to be more septic in nature  2. NSTEMI  Trop ~20 peak now downtrending, suggest more than demand given degree of rise - suspect multivessel disease and EF with global hypokinesis (stress) , EKG without acute findings  Cardiology consult appreciated.   3.Rhythm - iBBB on serial EKGs  - ? afib vs intermittent PVC similar pattern seen in 2018, no discernible acute changes   Plan  - continue hemodynamic support,   - continue heparin and ASA; no plans for immediate intervention unless patiet has refractory dysrhythmia, or ST elevations        Pulmonary/Ventilator Management:   1. Acute hypoxemic respiratory failure -   Suspect element of chronicity given polycythemia -  2. Aspiration Pneumonia - noted RML GGO  Plan  - continue lung protective ventilation  - antibiotics   - serial ABG and CXR as indicated  - increase PEEP given FIO2 requirement (70%)      GI and Nutrition :   1. Perforated ulcer - surgery following- decompensation this AM - now on two pressors, CXR without gross free air,  2. Elevated LFTs - multifactorial - sepsis, meds, fatty liver - stable on serial eval, imaging  Plan  - repeat CT ABD with oral contrast now evaluate for persistent leak - discussed with surgery this AM  - supportive cares  - serial labs and eval     Renal/Fluids/Electrolytes:   1. ROLANDO- Cr 1.6 from baseline < 0.8.  Electrolytes within  normal   acid base stable   2. Chronic cystitis - with existing suprapubic catheter  Plan  - monitor function and electrolytes as needed with replacement per ICU protocols. - generally avoid nephrotoxic agents such as NSAID, IV contrast unless specifically required  - adjust medications as needed for renal clearance  - follow I/O's as appropriate.  -Cont volume resuscitation     Infectious Disease:   1. Abdominal sepsis secondary to perforated duodenal ulcer  2.Aspiration pneumonitis, high potential for aspiration PNA  3. Hx of polymicrobial UTI/cystitis vs colonization with suprapubic cath - Hx of MRSA   Plan  - Cont Zosyn 4.5 Q6h  - add vanco   - start HAT given new pressor requirement      Endocrine:   1. H/o DMII  2. Stress induced hyperglycemia  3. Hx of iatrogenic cushings disease - previously on steroids  4. Hypothyroidism  Plan  - ICU insulin protocol, goal sugar <180        Hematology/Oncology:   1. Polycythemia- may be suggestive of dehydration, but patient with hematocrit of > 17 on previous admission.    Plan  - Monitor daily CBC  -Heparin gtt for NSTEMI      IV/Access:   1. Venous access - PIV. TLC   2. Arterial access - Radial arterial line as needed  Plan  - central access to be established if patient is requiring vasopressors        ICU Prophylaxis:   1. DVT: Hep gtt- no bolus  2. VAP: HOB 30 degrees, chlorhexidine rinse  3. Stress Ulcer: PPI  4. Restraints: Nonviolent soft two point restraints required and necessary for patient safety and continued cares and good effect as patient continues to pull at necessary lines, tubes despite education and distraction. Will readdress daily.   5. Wound care -NA  6. Feeding - NPO for medical reasons.  NGT to suction.    7. Family Update: Family updated on arrival to ICU, call out to spouse (cell and home) this AM with no answer  8. Disposition - ICU.  Prognosis guarded.      Key goals for next 24 hours:   1. Repeat CT scan  2. Stabilize hemodynamics  3. Add  vanco  4. Add HAT                Interim History:     Admit last night perforated ulcer and AMI - requiring intubation but stable hemodynamics  - overnight new dysrhythmia - ? Intermittent afib but EKG with more PVCs  With worsening hemodynamics, now on two pressors  - CXR this AM w/o gross air          Key Medications:       aspirin  324 mg Oral Once     aspirin  325 mg Oral Daily     chlorhexidine  15 mL Mouth/Throat Q12H     insulin aspart  1-6 Units Subcutaneous Q4H     pantoprazole (PROTONIX) IV  40 mg Intravenous Daily     piperacillin-tazobactam  4.5 g Intravenous Q6H     sodium chloride (PF)  3 mL Intracatheter Q8H       fentaNYL 100 mcg/hr (12/30/19 0715)     HEParin 1,050 Units/hr (12/30/19 0400)     - MEDICATION INSTRUCTIONS -       norepinephrine 0.1 mcg/kg/min (12/30/19 0445)     - MEDICATION INSTRUCTIONS -       propofol (DIPRIVAN) infusion 30 mcg/kg/min (12/30/19 0634)     ACE/ARB/ARNI NOT PRESCRIBED       BETA BLOCKER NOT PRESCRIBED       STATIN NOT PRESCRIBED       sodium chloride 75 mL/hr at 12/30/19 0738     vasopressin (PITRESSIN) infusion ADULT (40 mL) 2.4 Units/hr (12/30/19 0642)               Physical Examination:   Temp:  [99.2  F (37.3  C)-103  F (39.4  C)] 99.3  F (37.4  C)  Pulse:  [] 137  Heart Rate:  [] 130  Resp:  [9-40] 16  BP: ()/(27-86) 75/66  MAP:  [72 mmHg-76 mmHg] 72 mmHg  Arterial Line BP: (85-96)/(62-64) 85/62  FiO2 (%):  [70 %-100 %] 70 %  SpO2:  [40 %-100 %] 96 %    Intake/Output Summary (Last 24 hours) at 12/30/2019 0723  Last data filed at 12/30/2019 0400  Gross per 24 hour   Intake 3096.55 ml   Output 1675 ml   Net 1421.55 ml     Wt Readings from Last 4 Encounters:   12/30/19 138.8 kg (306 lb)   11/01/19 113.4 kg (250 lb)   10/28/19 121.7 kg (268 lb 3.2 oz)   08/29/19 117.5 kg (259 lb)     Arterial Line BP: (85-96)/(62-64) 85/62  MAP:  [72 mmHg-76 mmHg] 72 mmHg  BP - Mean:  [] 70  Ventilation Mode: CMV/AC  (Continuous Mandatory Ventilation/ Assist  Control)  FiO2 (%): 70 %  Rate Set (breaths/minute): 16 breaths/min  Tidal Volume Set (mL): 500 mL  PEEP (cm H2O): 5 cmH2O  Oxygen Concentration (%): 80 %  Resp: 16    Recent Labs   Lab 12/30/19  0045   PH 7.44   PCO2 41   PO2 120*   HCO3 28   O2PER 100       GEN: intubated intermittent mild distress with awakenings   HEENT: head ncat, sclera anicteric, OP patent, trachea midline   PULM: unlabored synchronous with vent, clear anteriorly diminished at bases and laterally   CV/COR: tachycardic irregular S1S2 no gallop,  No rub, no murmur  ABD:  Obese soft  Mild tender, hypoactive bowel sounds, no mass noted hernia  EXT: cool, no edema   NEURO: moving all 4 extremities   SKIN: mild mottling no obvious rash  LINES: clean, dry intact         Data:   All data and imaging reviewed     ROUTINE ICU LABS (Last four results)  CMP  Recent Labs   Lab 12/30/19 0420 12/29/19 2130 12/29/19  1443 12/29/19  1436   NA  --  146*  --  143   POTASSIUM  --  3.9  --  4.2   CHLORIDE  --  109  --  106   CO2  --  28  --  28   ANIONGAP  --  9  --  9   GLC  --  189*  --  207*   BUN  --  60*  --  65*   CR  --  1.40*  --  1.56*   GFRESTIMATED  --  40* 33* 34*   GFRESTBLACK  --  46* 39* 40*   DORY  --  8.6  --  9.8   MAG 2.2 2.1  --   --    PHOS  --  2.4*  --   --    PROTTOTAL  --  5.8*  --  6.8   ALBUMIN  --  2.5*  --  2.9*   BILITOTAL  --  1.0  --  1.1   ALKPHOS  --  97  --  108   AST  --  567*  --  715*   ALT  --  738*  --  743*     CBC  Recent Labs   Lab 12/30/19 0420 12/29/19 2130 12/29/19  1436   WBC 19.4* 23.5* 20.5*   RBC 6.45* 6.47* 6.64*   HGB 18.4* 17.8* 19.2*   HCT 57.2* 55.2* 57.9*   MCV 89 85 87   MCH 28.5 27.5 28.9   MCHC 32.2 32.2 33.2   RDW 16.7* 18.3* 15.8*    289 285     INR  Recent Labs   Lab 12/29/19  1535 12/29/19  1436   INR 1.61* Canceled, Test credited     Arterial Blood Gas  Recent Labs   Lab 12/30/19  0045   PH 7.44   PCO2 41   PO2 120*   HCO3 28   O2PER 100       All cultures:  Recent Labs   Lab 12/29/19  1507  12/29/19  1435   CULT No growth after 12 hours No growth after 12 hours     Recent Results (from the past 24 hour(s))   XR Chest Port 1 View    Narrative    CHEST PORTABLE ONE VIEW   12/29/2019 2:45 PM     HISTORY: Shortness of breath.    COMPARISON: Chest x-ray on 4/26/2019      Impression    IMPRESSION: Single portable AP view of the chest was obtained.  Endotracheal tube tip projects over mid thoracic trachea approximately  4 cm from the . Enteric tube crosses the diaphragm with the  distal tip outside the field of view. Right IJ central catheter  projects over high SVC. Although the image is mildly rotated, there is  apparent left-to-right midline shift, which could be due to  atelectatic changes of the right lung, recommend repeating the exam  with better positioning. Small bilateral pleural effusions. No  significant pneumothorax.    OCTAVIO MARTIN MD   CT Head w/o Contrast    Narrative    CT SCAN OF THE HEAD WITHOUT CONTRAST   12/29/2019 4:29 PM     HISTORY: Altered vomiting.    TECHNIQUE:  Axial images of the head and coronal reformations without  IV contrast material. Radiation dose for this scan was reduced using  automated exposure control, adjustment of the mA and/or kV according  to patient size, or iterative reconstruction technique.    COMPARISON: Head CT 7/26/2017.    FINDINGS:  Mildly limited exam due to artifact from large left-sided earring.  Mild volume loss is present. The cerebral hemispheres, brainstem, and  cerebellum otherwise demonstrate normal morphology and attenuation. No  evidence of acute ischemia, hemorrhage, mass, mass effect or  hydrocephalus. The visualized calvarium, tympanic cavities, mastoid  cavities, and paranasal sinuses are unremarkable.      Impression    IMPRESSION:   No acute intracranial abnormality.    TORI REID MD   CT Chest (PE) Abdomen Pelvis w Contrast    Narrative    CT CHEST PE ABDOMEN AND PELVIS WITH CONTRAST, 12/29/2019 4:29 PM    TECHNIQUE:   Helical CT images from the thoracic inlet through the  symphysis pubis were obtained  with contrast. Contrast dose: 135 mL  Isovue-[id number]    COMPARISON: CT abdomen and pelvis on 4/26/2019.    HISTORY: Feculent vomit, SOB, altered.    FINDINGS:    Chest/mediastinum: Endotracheal tube tip is in the mid thoracic  trachea. No evidence of pulmonary embolism. No cardiomegaly or  pericardial effusion. Moderate atherosclerotic vascular calcification  of the coronary arteries and thoracic aorta. The main pulmonary artery  is mildly enlarged measuring 3.3 cm, this can be seen with pulmonary  hypertension.    Lung/pleura: Small bilateral pleural effusions, left larger than right  with associated basilar atelectasis/consolidation. A few patchy areas  of groundglass pulmonary opacities in the right middle lobe,  indeterminant, could be infectious.    Abdomen and pelvis: Heterogenous enhancement of the liver. Gallbladder  is distended, otherwise unremarkable. No splenomegaly. No adrenal  nodules. Diffuse fatty replacement of the pancreas. No evidence of  kidney stones or hydronephrosis. Bilateral renal cysts.    No abnormally dilated bowel loops. Large amount of stool throughout  the colon. The tip of the enteric tube significantly stretched the  anterior wall of the pylorus of the stomach. There is significant fat  stranding along the pylorus and anterior aspect of the duodenum with  possible duodenal perforation. Small focus of gas along the lateral  aspect of the gastric pylorus/duodenum (series 8 image 72), likely  represents perforated peptic ulcer. Moderate predominantly  aortobiiliac atherosclerotic vascular calcification.    Bones and soft tissues: Multilevel degenerative changes of the  visualized spine. Multiple fat-containing ventral hernias.      Impression    IMPRESSION:   1. Significant fat stranding along the anterior aspect of the pylorus  and duodenum, with small adjacent focus of extraluminal gas, findings  likely  represent peptic ulcer with possible contained perforation.  2. The tip of enteric tube significantly stretched the anterior wall  of the pylorus.  3. No evidence of pulmonary embolism.  4. The main pulmonary artery is enlarged measuring 3.3 cm, this can be  seen with pulmonary hypertension.  5. Small bilateral pleural effusions, left larger than right with  associated basilar atelectasis/consolidation.  6. A few patchy areas of groundglass pulmonary opacities in the right  middle lobe, indeterminate, could be infectious.  7. Heterogenous enhancement of the liver of indeterminate etiology and  significance.    OCTAVIO MARTIN MD   Echocardiogram Complete    Narrative    [id number]  [id number]  [id number]  [id number]^REANNA^KURTIS^ARLYN           [facility]  Echocardiography Laboratory  [address] [place] [address]        Name: MICHELLE RUIZ  MRN: [id number]  : 1955  Study Date: 2019 07:59 PM  Age: 64 yrs  Gender: Female  Patient Location: [facility]  Reason For Study: Acute Myocardial Infarction  Ordering Physician: KURTIS FORTE     BSA: 2.5 m2  Height: 68 in  Weight: 308 lb     _____________________________________________________________________________  __  _____________________________________________________________________________  __        Interpretation Summary     Extremely technically challenging study due to poor acoustic windows and body  habitus, even with the use of contrast imaging.     Left ventricular systolic function is moderately reduced. LVEF 43% based on  biplane 2D tracing.  Global hypokinesis, the mid to apical wall segments are severely hypokinetic  with relatively preserved wall motion at the base.  This pattern of wall motion abnormalities can be seen in stress  cardiomyopathy, however multivessel coronary artery disease cannot be  excluded.     Moderately decreased right ventricular systolic function.  Cardiac valves are not well visualized,  however there is no obvious stenosis  or significant insufficiency present.     Inpatient cardiology consultant aware of these findings.  _____________________________________________________________________________  __        Left Ventricle  The left ventricle is normal in size. A sigmoid septum is present. Left  ventricular systolic function is moderately reduced. LVEF 43% based on biplane  2D tracing. Left ventricular diastolic function is not assessable. Wall motion  assessment is limited even with the use of contrast imaging, howevere there is  global hypokinesis, the mid to apical wall segments are severely hypokinetic  with relatively preserved wall motion at the base.     Right Ventricle  The right ventricle is grossly normal size. The right ventricle is not well  visualized. Moderately decreased right ventricular systolic function.     Atria  Normal left atrial size. Right atrial size is normal.        Mitral Valve  There is severe mitral annular calcification. Calcified mitral apparatus.     Tricuspid Valve  The tricuspid valve is not well visualized. No tricuspid regurgitation. Right  ventricular systolic pressure could not be approximated due to inadequate  tricuspid regurgitation.     Aortic Valve  The aortic valve is not well visualized. Morphology not well visualized  however appears calcified. No aortic regurgitation is present. No aortic  stenosis is present.     Pulmonic Valve  The pulmonic valve is not well visualized. There is no pulmonic valvular  regurgitation.     Vessels  The aortic root is not well visualized. The inferior vena cava is normal.  Unable to assess mean RA pressure given the patient is on a ventilator.        Pericardium  There is no pericardial effusion.  _____________________________________________________________________________  __           Doppler Measurements & Calculations  MV max PG: 3.5 mmHg  MV mean P.8 mmHg  MV V2 VTI: 25.0 cm            _____________________________________________________________________________  __           Report approved by: Rosa Odom 12/29/2019 08:48 PM            Billing: This patient is critically ill: Yes. Total critical care time today 45 min.

## 2019-12-30 NOTE — ANESTHESIA CARE TRANSFER NOTE
Patient: Bhavani White    Procedure(s):  EXPLORATORY LAPAROSCOPY, LAPAROTOMY, REPAIR OF ULCER PERFORATION, WOUND VAC PLACEMENT    Diagnosis: * No pre-op diagnosis entered *  Diagnosis Additional Information: No value filed.    Anesthesia Type:   General, ETT     Note:  Airway :ETT  Patient transferred to:ICU  Comments: Anesthesia Care Note    Patient: Bhavani White    Transferred to: ICU    Patient vital signs: Stable    Airway: ETT    Continuous monitoring to ICU.  No change in dentition. Report given to THANG.      Teresa Joseph CRNA   12/30/2019  ICU Handoff: Call for PAUSE to initiate/utilize ICU HANDOFF, Identified Patient, Identified Responsible Provider, Reviewed the Pertinent Medical History, Discussed Surgical Course, Reviewed Intra-OP Anesthesia Management and Issues during Anesthesia, Set Expectations for Post Procedure Period and Allowed Opportunity for Questions and Acknowledgement of Understanding      Vitals: (Last set prior to Anesthesia Care Transfer)    CRNA VITALS  12/30/2019 1359 - 12/30/2019 1439      12/30/2019             Resp Rate (set):  10                Electronically Signed By: ELISSA Jackson CRNA  December 30, 2019  2:39 PM

## 2019-12-30 NOTE — ANESTHESIA PREPROCEDURE EVALUATION
Anesthesia Pre-Procedure Evaluation    Patient: Bhavani White   MRN: [id number] : 1955          Preoperative Diagnosis: * No pre-op diagnosis entered *    Procedure(s):  LAPAROSCOPIC, PROBABLE LAPAROTOMY PERFERATED ULCER.    Past Medical History:   Diagnosis Date     Anxiety      Asthma      Depression      DM (diabetes mellitus) (H)      Frequent UTI      History of blood transfusion      History of ulcer disease 2014    She notes from NSAIDs; nearly . Discussed a hospitalization at [place] for this.     Hypertension      Hypothyroid      Iatrogenic Cushing's disease (H)     off prednisone now     Morbid obesity (H)      MRSA (methicillin resistant staph aureus) culture positive 2012    repeat cx 10/12/2012 no staph mentioned.     Osteoarthritis     multiple joings.     Other chronic pain      Sleep apnea     No longer uses cpap.     Past Surgical History:   Procedure Laterality Date     ABDOMEN SURGERY       C LAMINECTOMY,FACETECTOMY,LUMBAR  1982     C TOTAL KNEE ARTHROPLASTY      left     CYSTOSCOPY N/A 2018    Procedure: CYSTOSCOPY;  Cystoscopy and Botox Injection Into the Bladder and suprapubic tube exchange;  Surgeon: Yoandy Rios MD;  [facility]     CYSTOSCOPY N/A 3/15/2019    Procedure: Cystoscopy, suprapubic tube exchange;  Surgeon: Yoandy Rios MD;  [facility]: [facility]     CYSTOSCOPY FLEXIBLE, CYSTOSTOMY, INSERT TUBE SUPRAPUBIC, COMBINED N/A 2019    Procedure: Cystoscopy, Bladder Botox Injection, Suprapubic Tube Change;  Surgeon: Yoandy Rios MD;  [facility]     CYSTOSCOPY, INTRAVESICAL INJECTION N/A 2017    Procedure: CYSTOSCOPY, INTRAVESICAL INJECTION;  Cystoscopy, Botox Injection Into The Bladder  Latex Allergy ;  Surgeon: Yoandy Rios MD;  [facility]: [facility]     CYSTOSCOPY, INTRAVESICAL INJECTION N/A 3/8/2018    Procedure: CYSTOSCOPY, INTRAVESICAL INJECTION;  Cystoscopy, Botox Injection Into The Bladder and  suprapubic catheter exchange;  Surgeon: Yoandy Rios MD;  [facility]     DISCECTOMY, FUSION CERVICAL ANTERIOR THREE+ LEVELS, COMBINED Left 5/3/2016    Procedure: COMBINED DISCECTOMY, FUSION CERVICAL ANTERIOR THREE+ LEVELS;  Surgeon: Jasvir Torres MD;  [facility]: [facility] OR     GENITOURINARY SURGERY      suprapubic catheter     HERNIA REPAIR  1957    double hernia     HYSTERECTOMY, PAP NO LONGER INDICATED      CELIA and large ovarian tumor removed     INJECT BOTOX N/A 9/21/2018    Procedure: INJECT BOTOX;;  Surgeon: Yoandy Rios MD;  [facility]     INJECT BOTOX N/A 3/15/2019    Procedure: Inject Botox into Bladder;  Surgeon: Yoandy Rios MD;  Location:  OR     SURGICAL HISTORY OF -       left cataract surgery     SURGICAL HISTORY OF -   12/14    right cataract surgery and left laser revision     SURGICAL HISTORY OF -   March 2015    left carpal tunnel release     TONSILLECTOMY  1974       Anesthesia Evaluation     . Pt has had prior anesthetic.     No history of anesthetic complications          ROS/MED HX    ENT/Pulmonary:     (+)sleep apnea, asthma , . .    Neurologic:       Cardiovascular:     (+) Dyslipidemia, hypertension--CAD, -past MI,-. : . CHF . . :. . Previous cardiac testing Echodate:results:Globally reduced LV function : EF 45%  Moderately reduced RV functiondate: results:ECG reviewed date: results:Sinus tach date: results:          METS/Exercise Tolerance:     Hematologic:         Musculoskeletal:         GI/Hepatic: Comment: Perforated duodenal ulcer    (+) Other GI/Hepatic Acute hepatic insufficiency     (-) GERD   Renal/Genitourinary: Comment: Neurogenic bladder    (+) chronic renal disease, type: ARF,       Endo:     (+) type II DM thyroid problem Obesity, .      Psychiatric:     (+) psychiatric history anxiety      Infectious Disease: Comment: Septic shock requiring multiple pressors with multi-system organ failure        Malignancy:         Other:      "                            Lab Results   Component Value Date    WBC 18.8 (H) 12/30/2019    HGB 19.0 (H) 12/30/2019    HCT 58.9 (H) 12/30/2019     12/30/2019    CRP 30.0 (H) 05/16/2016    SED 11 03/04/2010     (H) 12/30/2019    POTASSIUM 4.7 12/30/2019    CHLORIDE 111 (H) 12/30/2019    CO2 28 12/30/2019    BUN 69 (H) 12/30/2019    CR 2.02 (H) 12/30/2019     (H) 12/30/2019    DORY 8.1 (L) 12/30/2019    PHOS 2.4 (L) 12/29/2019    MAG 2.2 12/30/2019    ALBUMIN 2.3 (L) 12/30/2019    PROTTOTAL 5.7 (L) 12/30/2019    ALT 1,363 (HH) 12/30/2019    AST 1,044 (HH) 12/30/2019    ALKPHOS 94 12/30/2019    BILITOTAL 1.5 (H) 12/30/2019    LIPASE 98 12/10/2017    AMYLASE 44 01/08/2009    PTT 28 12/29/2019    INR 1.61 (H) 12/29/2019    FIBR 576 (H) 01/19/2009    TSH 0.86 10/28/2019    T4 1.03 08/15/2018       Preop Vitals  BP Readings from Last 3 Encounters:   12/30/19 (!) 128/29   11/01/19 98/54   10/28/19 (!) 142/84    Pulse Readings from Last 3 Encounters:   12/30/19 141   11/01/19 82   10/28/19 106      Resp Readings from Last 3 Encounters:   12/30/19 16   11/01/19 16   08/29/19 14    SpO2 Readings from Last 3 Encounters:   12/30/19 97%   11/01/19 95%   10/28/19 95%      Temp Readings from Last 1 Encounters:   12/30/19 39.6  C (103.2  F) (Oral)    Ht Readings from Last 1 Encounters:   11/01/19 1.638 m (5' 4.5\")      Wt Readings from Last 1 Encounters:   12/30/19 138.8 kg (306 lb)    Estimated body mass index is 51.71 kg/m  as calculated from the following:    Height as of 11/1/19: 1.638 m (5' 4.5\").    Weight as of this encounter: 138.8 kg (306 lb).       Anesthesia Plan      History & Physical Review  History and physical reviewed and following examination; no interval change.    ASA Status:  5 emergent.    NPO Status:  Waived due to emergency    Plan for General and ETT with Intravenous induction. Maintenance will be Balanced.      Additional equipment: Arterial Line and Central Line      Postoperative " Care  Postoperative pain management:  IV analgesics.      Consents  Anesthetic plan, risks, benefits and alternatives discussed with:  Patient..                 Allie Cherry MD, MD

## 2019-12-30 NOTE — PROGRESS NOTES
Patient was transported to CT. Pt was placed on the transport vent.  Ventilation Mode: CMV/AC  (Continuous Mandatory Ventilation/ Assist Control)  FiO2 (%): 70 %  Rate Set (breaths/minute): 16 breaths/min  Tidal Volume Set (mL): 500 mL  PEEP (cm H2O): 5 cmH2O  Oxygen Concentration (%): 70 %  Resp: 16     FiO2 increased to 100% due to not being able to get a good pulse oximetry reading.  Pt tolerated the transport well.  12/30/2019  Kimberly Hawkins, RT

## 2019-12-30 NOTE — BRIEF OP NOTE
[facility]    Brief Operative Note    Pre-operative diagnosis:  Perforated viscus  Post-operative diagnosis Perforation of 2nd portion of duodenum due to ischemia    Procedure: Procedure(s):  EXPLORATORY LAPAROSCOPY, LAPAROTOMY, REPAIR OF ULCER PERFORATION, WOUND VAC PLACEMENT  Surgeon: Surgeon(s) and Role:     * Ayan Lopez MD - Primary     * Chika Ramirez PA-C - Assisting     * Bradford Porras MD - Assisting  Anesthesia: General   Estimated blood loss: 1500 ml of old blood an 50 ml of new  Drains: THAI in LUQ- Meloncot drain in perforation in RUQ. Wound vac  Specimens: None  Findings: 1500 ml of old blood throughout abdomen due to bleeding from 2nd portion of duodenum. Ischemia of parts of distal stomach. Ischemia and bleeding of 1-2 nd portion of the duodenum with large perforation.  Damage control operation performed.  Complications: None  Full operative note dictated.

## 2019-12-30 NOTE — PROGRESS NOTES
[facility] ICU RESPIRATORY NOTE        Date of Admission: 12/29/2019    Date of Intubation (most recent): 12/29/19    Reason for Mechanical Ventilation: AMS, Vomiting    Number of Days on Mechanical Ventilation: 2    Met Criteria for Spontaneous Breathing Trial: No    Reason for No Spontaneous Breathing Trial: per MD    Significant Events Today: None    ABG Results:   Recent Labs   Lab 12/30/19  0045   PH 7.44   PCO2 41   PO2 120*   HCO3 28   O2PER 100       Current Vent Settings: Ventilation Mode: CMV/AC  (Continuous Mandatory Ventilation/ Assist Control)  FiO2 (%): 80 %  Rate Set (breaths/minute): 16 breaths/min  Tidal Volume Set (mL): 500 mL  PEEP (cm H2O): 5 cmH2O  Oxygen Concentration (%): 80 %  Resp: 16      Plan: Will continue full ventilatory support    Sadiq Amado RT

## 2019-12-30 NOTE — CONSULTS
CARDIOLOGY CONSULT NOTE:    Consult Date:  12/29/2019      HISTORY OF PRESENT ILLNESS:      The patient is a 64-year-old female with a past medical history significant for morbid obesity, diabetes, sleep apnea (untreated), hypertension, hyperlipidemia, known ulcer disease, frequent UTIs, wheelchair dependent, status post suprapubic catheter placement, fibromyalgia, prior MRSA culture positive,  chronic pain on methadone, prior abdominal surgeries, who presented earlier this evening with altered mental status and emesis, and was found to have a perforated gastric ulcer, as well as respiratory failure requiring mechanical ventilation.      Cardiology was consulted due to an elevated troponin of 19.4.  The patient is currently mechanically ventilated and is unable to provide a history, and so history is obtained through chart review.  Apparently, the patient was having a few days of altered mental status and was not taking her medications and then was found to be vomiting profusely.  Per EMS en route, the patient was vomiting fecal material with probable aspiration.  Vitals charted here on admission to the ICU were notable for temperature of 38.2, heart rate 104, blood pressure 109/76 on mechanical ventilation with a propofol drip.  Initial labs are notable for a potassium 4.2, creatinine 1.5, , .  Lactate 4.1, troponin 19.4, WBC of 20, hemoglobin 19, platelets 285,000.  Initial imaging was notable for a CT head that was negative, chest x-ray after intubation that shows a possible left to right midline shift, small bilateral pleural effusions with no significant pneumothorax, and ET tube which is 4 cm from the .  A CT chest, abdomen and pelvis was notable for negative for PE, small bilateral pleural effusions and ground-glass opacities in the right middle lobe, heterogeneous enhancement of the liver and a significant fat stranding along the anterior aspect of the pylorus and duodenum with a small  adjacent focus of extraluminal gas concerning for a peptic ulcer with a contained perforation.      General Surgery was consulted and recommended nonoperative management if possible due to a high risk for morbidity and mortality with surgical intervention at this time.      ECG 12/29/2019 at approximately 1700 shows normal sinus rhythm with occasional PVCs, intraventricular conduction delay, no ST elevation.  ECG from earlier on 12/29/2019 at approximately 1440 shows a sinus tachycardia, intraventricular conduction delay, QTc 442 milliseconds.  Possible Q-waves in the anteroseptal leads.  These Q-waves were present on an ECG from 08/15/2018.      ASSESSMENT AND PLAN:      In summary, patient, Bhavani White, is a 64-year-old female with a past medical history significant for morbid obesity, wheelchair-bound with suprapubic catheter placement, fibromyalgia, chronic pain on methadone, diabetes, hypertension, hyperlipidemia, known ulcerative disease, sleep apnea, not on CPAP, admitted with a perforated peptic ulcer and respiratory failure requiring mechanical ventilation for whom Cardiology was consulted regarding an abnormal troponin of 19.4.     1.  Elevated troponin 19.4.  Unclear if this represents a type 1 myocardial infarction versus type 2 myocardial infarction, suspect that this is more likely representative of a type 2 myocardial infarction; however, since the patient is mechanically ventilated, it is unclear whether or not there is a history of chest pain.   2.  Perforated gastric ulcer.  Three days of altered mental status and vomiting, per EMS, the patient was vomiting fecal material, CT chest, abdomen and pelvis 12/29/2019 notable for extraluminal gas and fat stranding along the pylorus and duodenum concerning for peptic ulcer with a contained perforation. Known ulcer disease.  3.  Nonspecific intraventricular conduction delay on ECG.  ECG from initial presentation 12/29/2019 at 1440, seems to show some  findings suggestive of possible rate-dependent right bundle branch block.  There are no ST elevations and no clear ST depressions, I suspect that the downsloping ST depression in leads I and aVL are representative of repolarization abnormalities related to the nonspecific intraventricular conduction delay.  Notably, QTc at 1440 was 442 milliseconds.   4.  Sepsis, fever, lactic acidosis.  Perforated gastric ulcer.  5.  Transaminitis.  ,  on presentation.   6.  Acute kidney injury.   7.  Respiratory failure requiring mechanical ventilation.   8.  Leukocytosis.   9.  Chronic pain, on methadone.      Discussed case with the consulting general surgeon, primary ICU physician team and a colleague in Interventional Cardiology.  Unfortunately, our options are quite limited at this time, due to her active medical conditions.     If able, I would recommend starting aspirin as well as a heparin GTT.  I would not recommend coronary angiography and possible stenting at this time, since she is normotensive and hemodynamically stable with stable mechanical ventilator requirements.  If she is able to tolerate the heparin and aspirin, she may be a candidate for at least a diagnostic coronary angiogram in the next coming days.      I note that an echocardiogram was ordered, the results of which are still pending.  Even if there are wall motion abnormalities and her cardiac function is reduced, given her normal blood pressure and stable ventilation requirements, the risk of coronary angiography and possible stenting likely outweighs the benefits, due to the requirement for at least 1 month of anti-platelet therapy (uninterrupted) as well as a period of therapeutic anticoagulation for the procedure.     Recommend limiting QTc prolonging medications in the setting of methadone administration.     Thank you for allowing our team to participate in the care of patient, Bhavani White.  Please do not hesitate to page or call me  anytime if there are any questions or concerns or change in clinical status.      Marquis Celaya MD  Cardiology  Pager:  [phone]  Text Page   2019         D: 2019   T: 2019   MT:       Name:     MICHELLE RUIZ   MRN:      [id number]        Account:       [id number]   :      1955           Consult Date:  2019      Document: [id number]

## 2019-12-30 NOTE — PROGRESS NOTES
ICU Update    Pressors soft, 3rd pressor added, volume responsive after 250 albumin, additional volume ordered. Discussed with cards - review of ECHO looks more septic in nature. Will discontinue heparin and asa. Awaiting read of repeat CT     Hernan Jackson MD

## 2019-12-30 NOTE — PHARMACY-VANCOMYCIN DOSING SERVICE
Pharmacy Vancomycin Initial Note  Date of Service 2019  Patient's  1955  64 year old, female    Indication: Aspiration Pneumonia, Sepsis and Hx of MRSA.     Current estimated CrCl = Estimated Creatinine Clearance: 57.9 mL/min (A) (based on SCr of 1.4 mg/dL (H)).    Creatinine for last 3 days  2019:  2:36 PM Creatinine 1.56 mg/dL;  9:30 PM Creatinine 1.40 mg/dL    Recent Vancomycin Level(s) for last 3 days  No results found for requested labs within last 72 hours.      Vancomycin IV Administrations (past 72 hours)      No vancomycin orders with administrations in past 72 hours.                Nephrotoxins and other renal medications (From now, onward)    Start     Dose/Rate Route Frequency Ordered Stop    19 0745  vancomycin (VANCOCIN) 2,500 mg in sodium chloride 0.9 % 500 mL intermittent infusion      2,500 mg  over 2 Hours Intravenous ONCE 19 0744      19 0744  vancomycin place ortega - receiving intermittent dosing      1 each Intravenous SEE ADMIN INSTRUCTIONS 19 0744      19 0600  vasopressin (VASOSTRICT) 40 Units in D5W 40 mL infusion      2.4 Units/hr  2.4 mL/hr  Intravenous CONTINUOUS 19 0550      19 2300  piperacillin-tazobactam (ZOSYN) 4.5 g vial to attach to  mL bag     Note to Pharmacy:  Pharmacy can adjust dose based on renal function.    4.5 g  over 30 Minutes Intravenous EVERY 6 HOURS 19 1933      19 1900  norepinephrine (LEVOPHED) 16 mg in  mL infusion      0.03-0.4 mcg/kg/min × 140 kg  3.9-52.5 mL/hr  Intravenous CONTINUOUS 19 1858            Contrast Orders - past 72 hours (72h ago, onward)    Start     Dose/Rate Route Frequency Ordered Stop    19 0745  diatrizoate meglumine-sodium (GASTROGRAFIN/GASTROVIEW) 66-10 % solution 5 mL      5 mL Oral ONCE 19 0742      19 0745  iohexol (OMNIPAQUE) solution 140 mL      140 mL Oral ONCE 19 0744      19 2100  perflutren diluted 1mL  to 2mL with saline (OPTISON) diluted injection 2 mL      2 mL Intravenous ONCE 12/29/19 2054 12/29/19 2055 12/29/19 1502  iopamidol (ISOVUE-370) solution 135 mL      135 mL Intravenous ONCE 12/29/19 1501 12/29/19 1621                Plan:  1.  Give vancomycin  2500 mg IV x 1 dose then continue dosing based on levels.     2.  Goal Trough Level: 15-20 mg/L   3.  Pharmacy will check trough levels as appropriate in 1-3 Days.    4. Serum creatinine levels will be ordered daily for the first week of therapy and at least twice weekly for subsequent weeks.    5. [place] method utilized to dose vancomycin therapy: Method 2    Tiny Everett, PharmD, BCPS

## 2019-12-30 NOTE — PROVIDER NOTIFICATION
Notified Dr Jackson of positive blood culture results from [facility] micro lab.  Blood from central line on 12/29 growing gram positive cocci in clusters.  Patient currently en route to OR.  No new orders at this time.

## 2019-12-31 ENCOUNTER — ANESTHESIA EVENT (OUTPATIENT)
Dept: SURGERY | Facility: CLINIC | Age: 64
DRG: 853 | End: 2019-12-31
Payer: MEDICARE

## 2019-12-31 PROBLEM — K26.5 DUODENAL ULCER PERFORATION (H): Status: ACTIVE | Noted: 2019-12-29

## 2019-12-31 LAB
ALBUMIN SERPL-MCNC: 2.5 G/DL (ref 3.4–5)
ALBUMIN SERPL-MCNC: 2.7 G/DL (ref 3.4–5)
ALP SERPL-CCNC: 73 U/L (ref 40–150)
ALP SERPL-CCNC: 74 U/L (ref 40–150)
ALT SERPL W P-5'-P-CCNC: 1221 U/L (ref 0–50)
ALT SERPL W P-5'-P-CCNC: 1263 U/L (ref 0–50)
ANION GAP SERPL CALCULATED.3IONS-SCNC: 12 MMOL/L (ref 3–14)
ANION GAP SERPL CALCULATED.3IONS-SCNC: 9 MMOL/L (ref 3–14)
AST SERPL W P-5'-P-CCNC: 768 U/L (ref 0–45)
AST SERPL W P-5'-P-CCNC: 977 U/L (ref 0–45)
BILIRUB DIRECT SERPL-MCNC: 1.5 MG/DL (ref 0–0.2)
BILIRUB DIRECT SERPL-MCNC: 1.9 MG/DL (ref 0–0.2)
BILIRUB SERPL-MCNC: 1.9 MG/DL (ref 0.2–1.3)
BILIRUB SERPL-MCNC: 2.5 MG/DL (ref 0.2–1.3)
BUN SERPL-MCNC: 80 MG/DL (ref 7–30)
BUN SERPL-MCNC: 86 MG/DL (ref 7–30)
CALCIUM SERPL-MCNC: 6.3 MG/DL (ref 8.5–10.1)
CALCIUM SERPL-MCNC: 6.3 MG/DL (ref 8.5–10.1)
CHLORIDE SERPL-SCNC: 115 MMOL/L (ref 94–109)
CHLORIDE SERPL-SCNC: 119 MMOL/L (ref 94–109)
CO2 BLD-SCNC: 21 MMOL/L (ref 21–28)
CO2 SERPL-SCNC: 19 MMOL/L (ref 20–32)
CO2 SERPL-SCNC: 21 MMOL/L (ref 20–32)
CREAT SERPL-MCNC: 2.36 MG/DL (ref 0.52–1.04)
CREAT SERPL-MCNC: 2.5 MG/DL (ref 0.52–1.04)
ERYTHROCYTE [DISTWIDTH] IN BLOOD BY AUTOMATED COUNT: 16.3 % (ref 10–15)
ERYTHROCYTE [DISTWIDTH] IN BLOOD BY AUTOMATED COUNT: 16.4 % (ref 10–15)
GFR SERPL CREATININE-BSD FRML MDRD: 20 ML/MIN/{1.73_M2}
GFR SERPL CREATININE-BSD FRML MDRD: 21 ML/MIN/{1.73_M2}
GLUCOSE BLDC GLUCOMTR-MCNC: 109 MG/DL (ref 70–99)
GLUCOSE BLDC GLUCOMTR-MCNC: 116 MG/DL (ref 70–99)
GLUCOSE BLDC GLUCOMTR-MCNC: 141 MG/DL (ref 70–99)
GLUCOSE BLDC GLUCOMTR-MCNC: 164 MG/DL (ref 70–99)
GLUCOSE BLDC GLUCOMTR-MCNC: 185 MG/DL (ref 70–99)
GLUCOSE BLDC GLUCOMTR-MCNC: 196 MG/DL (ref 70–99)
GLUCOSE BLDC GLUCOMTR-MCNC: 223 MG/DL (ref 70–99)
GLUCOSE BLDC GLUCOMTR-MCNC: 242 MG/DL (ref 70–99)
GLUCOSE BLDC GLUCOMTR-MCNC: 247 MG/DL (ref 70–99)
GLUCOSE BLDC GLUCOMTR-MCNC: 252 MG/DL (ref 70–99)
GLUCOSE BLDC GLUCOMTR-MCNC: 253 MG/DL (ref 70–99)
GLUCOSE BLDC GLUCOMTR-MCNC: 256 MG/DL (ref 70–99)
GLUCOSE BLDC GLUCOMTR-MCNC: 257 MG/DL (ref 70–99)
GLUCOSE SERPL-MCNC: 285 MG/DL (ref 70–99)
GLUCOSE SERPL-MCNC: 290 MG/DL (ref 70–99)
HCT VFR BLD AUTO: 40.2 % (ref 35–47)
HCT VFR BLD AUTO: 42 % (ref 35–47)
HCT VFR BLD CALC: 40 %PCV (ref 35–47)
HGB BLD CALC-MCNC: 13.6 G/DL (ref 11.7–15.7)
HGB BLD-MCNC: 12.8 G/DL (ref 11.7–15.7)
HGB BLD-MCNC: 13.4 G/DL (ref 11.7–15.7)
LACTATE BLD-SCNC: 2 MMOL/L (ref 0.7–2)
MAGNESIUM SERPL-MCNC: 1.8 MG/DL (ref 1.6–2.3)
MCH RBC QN AUTO: 27.8 PG (ref 26.5–33)
MCH RBC QN AUTO: 28.2 PG (ref 26.5–33)
MCHC RBC AUTO-ENTMCNC: 31.8 G/DL (ref 31.5–36.5)
MCHC RBC AUTO-ENTMCNC: 31.9 G/DL (ref 31.5–36.5)
MCV RBC AUTO: 87 FL (ref 78–100)
MCV RBC AUTO: 88 FL (ref 78–100)
PCO2 BLD: 36 MM HG (ref 35–45)
PH BLD: 7.37 PH (ref 7.35–7.45)
PLATELET # BLD AUTO: 126 10E9/L (ref 150–450)
PLATELET # BLD AUTO: 146 10E9/L (ref 150–450)
PO2 BLD: 97 MM HG (ref 80–105)
POTASSIUM BLD-SCNC: 4.3 MMOL/L (ref 3.4–5.3)
POTASSIUM SERPL-SCNC: 3.7 MMOL/L (ref 3.4–5.3)
POTASSIUM SERPL-SCNC: 3.8 MMOL/L (ref 3.4–5.3)
PROT SERPL-MCNC: 4.7 G/DL (ref 6.8–8.8)
PROT SERPL-MCNC: 4.8 G/DL (ref 6.8–8.8)
RBC # BLD AUTO: 4.6 10E12/L (ref 3.8–5.2)
RBC # BLD AUTO: 4.75 10E12/L (ref 3.8–5.2)
SAO2 % BLDA FROM PO2: 97 % (ref 92–100)
SODIUM BLD-SCNC: 145 MMOL/L (ref 133–144)
SODIUM SERPL-SCNC: 146 MMOL/L (ref 133–144)
SODIUM SERPL-SCNC: 149 MMOL/L (ref 133–144)
VANCOMYCIN SERPL-MCNC: 14.8 MG/L
WBC # BLD AUTO: 11.3 10E9/L (ref 4–11)
WBC # BLD AUTO: 16.1 10E9/L (ref 4–11)

## 2019-12-31 PROCEDURE — 25000128 H RX IP 250 OP 636: Performed by: SURGERY

## 2019-12-31 PROCEDURE — 25000128 H RX IP 250 OP 636: Performed by: INTERNAL MEDICINE

## 2019-12-31 PROCEDURE — 25000128 H RX IP 250 OP 636

## 2019-12-31 PROCEDURE — 94003 VENT MGMT INPAT SUBQ DAY: CPT

## 2019-12-31 PROCEDURE — 25800030 ZZH RX IP 258 OP 636: Performed by: INTERNAL MEDICINE

## 2019-12-31 PROCEDURE — 82248 BILIRUBIN DIRECT: CPT | Performed by: INTERNAL MEDICINE

## 2019-12-31 PROCEDURE — 20000003 ZZH R&B ICU

## 2019-12-31 PROCEDURE — 25000131 ZZH RX MED GY IP 250 OP 636 PS 637: Mod: GY | Performed by: INTERNAL MEDICINE

## 2019-12-31 PROCEDURE — 85027 COMPLETE CBC AUTOMATED: CPT | Performed by: INTERNAL MEDICINE

## 2019-12-31 PROCEDURE — 83735 ASSAY OF MAGNESIUM: CPT | Performed by: INTERNAL MEDICINE

## 2019-12-31 PROCEDURE — P9041 ALBUMIN (HUMAN),5%, 50ML: HCPCS

## 2019-12-31 PROCEDURE — 93005 ELECTROCARDIOGRAM TRACING: CPT

## 2019-12-31 PROCEDURE — 40000008 ZZH STATISTIC AIRWAY CARE

## 2019-12-31 PROCEDURE — 99233 SBSQ HOSP IP/OBS HIGH 50: CPT | Performed by: INTERNAL MEDICINE

## 2019-12-31 PROCEDURE — 25000125 ZZHC RX 250: Performed by: INTERNAL MEDICINE

## 2019-12-31 PROCEDURE — 25800030 ZZH RX IP 258 OP 636: Performed by: SURGERY

## 2019-12-31 PROCEDURE — 80048 BASIC METABOLIC PNL TOTAL CA: CPT | Performed by: INTERNAL MEDICINE

## 2019-12-31 PROCEDURE — 99291 CRITICAL CARE FIRST HOUR: CPT | Mod: 24 | Performed by: INTERNAL MEDICINE

## 2019-12-31 PROCEDURE — 80202 ASSAY OF VANCOMYCIN: CPT | Performed by: INTERNAL MEDICINE

## 2019-12-31 PROCEDURE — 40000275 ZZH STATISTIC RCP TIME EA 10 MIN

## 2019-12-31 PROCEDURE — 80076 HEPATIC FUNCTION PANEL: CPT | Performed by: INTERNAL MEDICINE

## 2019-12-31 PROCEDURE — 83605 ASSAY OF LACTIC ACID: CPT | Performed by: INTERNAL MEDICINE

## 2019-12-31 PROCEDURE — C9113 INJ PANTOPRAZOLE SODIUM, VIA: HCPCS | Performed by: INTERNAL MEDICINE

## 2019-12-31 PROCEDURE — 80053 COMPREHEN METABOLIC PANEL: CPT | Performed by: INTERNAL MEDICINE

## 2019-12-31 PROCEDURE — 00000146 ZZHCL STATISTIC GLUCOSE BY METER IP

## 2019-12-31 PROCEDURE — 93010 ELECTROCARDIOGRAM REPORT: CPT | Performed by: INTERNAL MEDICINE

## 2019-12-31 RX ORDER — DEXTROSE MONOHYDRATE 25 G/50ML
25-50 INJECTION, SOLUTION INTRAVENOUS
Status: DISCONTINUED | OUTPATIENT
Start: 2019-12-31 | End: 2020-01-21 | Stop reason: HOSPADM

## 2019-12-31 RX ORDER — DEXTROSE MONOHYDRATE 100 MG/ML
INJECTION, SOLUTION INTRAVENOUS CONTINUOUS PRN
Status: DISCONTINUED | OUTPATIENT
Start: 2019-12-31 | End: 2020-01-03

## 2019-12-31 RX ORDER — ALBUMIN, HUMAN INJ 5% 5 %
12.5 SOLUTION INTRAVENOUS ONCE
Status: COMPLETED | OUTPATIENT
Start: 2019-12-31 | End: 2019-12-31

## 2019-12-31 RX ORDER — ALBUMIN, HUMAN INJ 5% 5 %
SOLUTION INTRAVENOUS
Status: COMPLETED
Start: 2019-12-31 | End: 2019-12-31

## 2019-12-31 RX ORDER — NICOTINE POLACRILEX 4 MG
15-30 LOZENGE BUCCAL
Status: DISCONTINUED | OUTPATIENT
Start: 2019-12-31 | End: 2020-01-21 | Stop reason: HOSPADM

## 2019-12-31 RX ORDER — LEVOTHYROXINE SODIUM ANHYDROUS 100 UG/5ML
50 INJECTION, POWDER, LYOPHILIZED, FOR SOLUTION INTRAVENOUS DAILY
Status: DISCONTINUED | OUTPATIENT
Start: 2019-12-31 | End: 2020-01-06

## 2019-12-31 RX ADMIN — VANCOMYCIN HYDROCHLORIDE 2500 MG: 5 INJECTION, POWDER, LYOPHILIZED, FOR SOLUTION INTRAVENOUS at 13:52

## 2019-12-31 RX ADMIN — MEROPENEM 1 G: 1 INJECTION, POWDER, FOR SOLUTION INTRAVENOUS at 16:10

## 2019-12-31 RX ADMIN — VASOPRESSIN 2.4 UNITS/HR: 20 INJECTION INTRAVENOUS at 10:10

## 2019-12-31 RX ADMIN — ANGIOTENSIN II 20 NG/KG/MIN: 2.5 INJECTION INTRAVENOUS at 03:32

## 2019-12-31 RX ADMIN — HYDROCORTISONE SODIUM SUCCINATE 100 MG: 100 INJECTION, POWDER, FOR SOLUTION INTRAMUSCULAR; INTRAVENOUS at 16:10

## 2019-12-31 RX ADMIN — CHLORHEXIDINE GLUCONATE 15 ML: 1.2 RINSE ORAL at 19:34

## 2019-12-31 RX ADMIN — FLUCONAZOLE 200 MG: 2 INJECTION, SOLUTION INTRAVENOUS at 07:27

## 2019-12-31 RX ADMIN — CHLORHEXIDINE GLUCONATE 15 ML: 1.2 RINSE ORAL at 07:33

## 2019-12-31 RX ADMIN — THIAMINE HYDROCHLORIDE 200 MG: 100 INJECTION, SOLUTION INTRAMUSCULAR; INTRAVENOUS at 09:19

## 2019-12-31 RX ADMIN — PROPOFOL 25 MCG/KG/MIN: 10 INJECTION, EMULSION INTRAVENOUS at 11:58

## 2019-12-31 RX ADMIN — MEROPENEM 1 G: 1 INJECTION, POWDER, FOR SOLUTION INTRAVENOUS at 07:27

## 2019-12-31 RX ADMIN — ASCORBIC ACID 1500 MG: 500 INJECTION, SOLUTION INTRAMUSCULAR; INTRAVENOUS; SUBCUTANEOUS at 13:53

## 2019-12-31 RX ADMIN — SODIUM CHLORIDE 3 UNITS/HR: 9 INJECTION, SOLUTION INTRAVENOUS at 12:41

## 2019-12-31 RX ADMIN — Medication 0.28 MCG/KG/MIN: at 19:32

## 2019-12-31 RX ADMIN — ASCORBIC ACID 1500 MG: 500 INJECTION, SOLUTION INTRAMUSCULAR; INTRAVENOUS; SUBCUTANEOUS at 07:27

## 2019-12-31 RX ADMIN — HYDROCORTISONE SODIUM SUCCINATE 100 MG: 100 INJECTION, POWDER, FOR SOLUTION INTRAMUSCULAR; INTRAVENOUS at 07:27

## 2019-12-31 RX ADMIN — PROPOFOL 30 MCG/KG/MIN: 10 INJECTION, EMULSION INTRAVENOUS at 03:22

## 2019-12-31 RX ADMIN — Medication 0.2 MCG/KG/MIN: at 12:40

## 2019-12-31 RX ADMIN — Medication 100 MCG/HR: at 22:30

## 2019-12-31 RX ADMIN — SODIUM CHLORIDE 11 UNITS/HR: 9 INJECTION, SOLUTION INTRAVENOUS at 19:31

## 2019-12-31 RX ADMIN — INSULIN ASPART 3 UNITS: 100 INJECTION, SOLUTION INTRAVENOUS; SUBCUTANEOUS at 04:01

## 2019-12-31 RX ADMIN — MAGNESIUM SULFATE HEPTAHYDRATE 2 G: 40 INJECTION, SOLUTION INTRAVENOUS at 04:38

## 2019-12-31 RX ADMIN — INSULIN ASPART 3 UNITS: 100 INJECTION, SOLUTION INTRAVENOUS; SUBCUTANEOUS at 07:39

## 2019-12-31 RX ADMIN — PANTOPRAZOLE SODIUM 40 MG: 40 INJECTION, POWDER, FOR SOLUTION INTRAVENOUS at 09:19

## 2019-12-31 RX ADMIN — ASCORBIC ACID 1500 MG: 500 INJECTION, SOLUTION INTRAMUSCULAR; INTRAVENOUS; SUBCUTANEOUS at 01:49

## 2019-12-31 RX ADMIN — SODIUM CHLORIDE 10 UNITS/HR: 9 INJECTION, SOLUTION INTRAVENOUS at 17:04

## 2019-12-31 RX ADMIN — PROPOFOL 25 MCG/KG/MIN: 10 INJECTION, EMULSION INTRAVENOUS at 06:40

## 2019-12-31 RX ADMIN — LEVOTHYROXINE SODIUM ANHYDROUS 50 MCG: 100 INJECTION, POWDER, LYOPHILIZED, FOR SOLUTION INTRAVENOUS at 13:53

## 2019-12-31 RX ADMIN — ALBUMIN HUMAN 12.5 G: 50 SOLUTION INTRAVENOUS at 14:34

## 2019-12-31 RX ADMIN — PROPOFOL 25 MCG/KG/MIN: 10 INJECTION, EMULSION INTRAVENOUS at 20:55

## 2019-12-31 RX ADMIN — ALBUMIN HUMAN 12.5 G: 0.05 INJECTION, SOLUTION INTRAVENOUS at 14:34

## 2019-12-31 RX ADMIN — ASCORBIC ACID 1500 MG: 500 INJECTION, SOLUTION INTRAMUSCULAR; INTRAVENOUS; SUBCUTANEOUS at 19:31

## 2019-12-31 RX ADMIN — PROPOFOL 25 MCG/KG/MIN: 10 INJECTION, EMULSION INTRAVENOUS at 16:57

## 2019-12-31 ASSESSMENT — ACTIVITIES OF DAILY LIVING (ADL)
ADLS_ACUITY_SCORE: 25

## 2019-12-31 NOTE — PROGRESS NOTES
"Patients  called writer and startin cussing and screaming at writer. Saying \"No one cares about my wife, i'm never going to see her again.\"  was very hostile and hung up the phone, will attempt to call sister with an update.  "

## 2019-12-31 NOTE — PROGRESS NOTES
" called in for an update, POC discussed.  is \"frustrated, I feel like I am not getting any straight answers from anyone.\" Reassured MD's will contact him when they come in this a.m. after making rounds unless something unforseen occurs. Answered multiple questions, regarding POC.  feels \"left out, I just want to talk to my wife\" Again explained the inability to speak with pt. due to ETT, sedation. He understands this. Reassurance provided.  "

## 2019-12-31 NOTE — PROGRESS NOTES
" went home for the night. POC explained to pt. & paients sister they are acknowledging that they are aware of the gravity of pt's condition \"I'll just sit here & worry, I would rather be home, try to get some rest\". Reassured that RN would notify him if any changes occur.  "

## 2019-12-31 NOTE — PROGRESS NOTES
[facility]  Patient is currently open to home care services with [facility]. The patient is currently receiving Skilled Nursing services. [facility]  and team have been notified of patient admission. [facility] liaison will continue to follow patient during stay. If appropriate provide orders to resume home care at time of discharge.

## 2019-12-31 NOTE — PROGRESS NOTES
Surgery    Events overnight reviewed.  Has been able to wean off some pressors.  Still on levo fed and vasopressin.  Having bilious output from wound VAC and tube in duodenum.  Does not respond as sedation is lightened.    Abdomen  Bilious output seen in wound VAC.  No blood or other drainage. Vac working appropriately    A/P  Having bilious drainage from wound VAC and [place] tube.  Will need to go back for reexploration when stabilized.  Tube is currently not containing in the large perforation and that there is worry for ongoing ischemia which would be very difficult to surgically manage.  The patient is in critical condition with multiple other medical issues and has a very high morbidity and mortality with this diagnosis.  I had a long discussion with the  regarding her operative findings and outlook for ongoing management.  We discussed that the best outcome would be a very prolonged recovery with multiple drainage tubes in place if she was able to survive the acute injury.  I also advised him that if there is more ischemia or we cannot contain the leak with drainage that there would be limited surgical options available as she would not tolerate any major diversions or surgical procedures given the location at the 2nd portion of the duodenum. He understood and would like to continue care at this time.    Ayan Lopez M.D.  [facility]  [phone]    TIME SPENT:  35 minutes was spent with patient and greater than 50% of the time was spent counseling and coordination of care.

## 2019-12-31 NOTE — PROGRESS NOTES
[facility] ICU RESPIRATORY NOTE    Date of Admission: 12/29/2019  Date of Intubation (most recent): 12/29/2019  Reason for Mechanical Ventilation: Respiratory failure  Number of Days on Mechanical Ventilation: 3  Met Criteria for Spontaneous Breathing Trial: No   Reason for No Spontaneous Breathing Trial: per MD    Significant Events Today: None overnight    ABG Results:   Recent Labs   Lab 12/30/19  0045   PH 7.44   PCO2 41   PO2 120*   HCO3 28   O2PER 100     Current Vent Settings: Ventilation Mode: CMV/AC  (Continuous Mandatory Ventilation/ Assist Control)  FiO2 (%): 70 %  Rate Set (breaths/minute): 16 breaths/min  Tidal Volume Set (mL): 500 mL  PEEP (cm H2O): 10 cmH2O  Oxygen Concentration (%): 70 %  Resp: 16    Plan: Continue patient on full vent support and assess for weaning readiness in AM.     Starla Johnson RT

## 2019-12-31 NOTE — PLAN OF CARE
Able to wean Norepi to lowest dose ordered, did wean to off, however MAP 62-68, turned back on. Angiotensin remains @ 20, Vaso @ 2.4. Sedated on Propofol & Fent, opens eyes with turning, grimaces & bites down with oral cares, minimal contraction of UE to noxious stimuli. UOP adeq. Temp now down after several hours of cooling blanket. Hands & feet remain cold, pale. HR SR <100 with temp down however occ. Episodes of 's for brief time.

## 2019-12-31 NOTE — PHARMACY-VANCOMYCIN DOSING SERVICE
Pharmacy Vancomycin Note  Date of Service 2019  Patient's  1955   64 year old, female    Indication: Aspiration Pneumonia, Sepsis and Hx of MRSA.  Goal Trough Level: 15-20 mg/L  Day of Therapy: 2  Current Vancomycin regimen: Intermittent dosing based on levels.    Current estimated CrCl = Estimated Creatinine Clearance: 29.9 mL/min (A) (based on SCr of 2.5 mg/dL (H)).    Creatinine for last 3 days  2019:  2:36 PM Creatinine 1.56 mg/dL;  9:30 PM Creatinine 1.40 mg/dL  2019:  8:00 AM Creatinine 2.02 mg/dL  2019:  4:00 AM Creatinine 2.50 mg/dL    Recent Vancomycin Levels (past 3 days)  2019: 10:35 AM Vancomycin Level 14.8 mg/L    Vancomycin IV Administrations (past 72 hours)                   vancomycin (VANCOCIN) 2,500 mg in sodium chloride 0.9 % 500 mL intermittent infusion (mg) 2,500 mg New Bag 19 1109                Nephrotoxins and other renal medications (From now, onward)    Start     Dose/Rate Route Frequency Ordered Stop    19 1230  vancomycin (VANCOCIN) 2,500 mg in sodium chloride 0.9 % 500 mL intermittent infusion      2,500 mg  over 2 Hours Intravenous ONCE 19 1145      19 0744  vancomycin place ortega - receiving intermittent dosing      1 each Intravenous SEE ADMIN INSTRUCTIONS 19 0744      19 0600  vasopressin (VASOSTRICT) 40 Units in D5W 40 mL infusion      2.4 Units/hr  2.4 mL/hr  Intravenous CONTINUOUS 19 0550      19 1900  norepinephrine (LEVOPHED) 16 mg in  mL infusion      0.03-0.4 mcg/kg/min × 140 kg  3.9-52.5 mL/hr  Intravenous CONTINUOUS 19 1858               Contrast Orders - past 72 hours (72h ago, onward)    Start     Dose/Rate Route Frequency Ordered Stop    19 0745  diatrizoate meglumine-sodium (GASTROGRAFIN/GASTROVIEW) 66-10 % solution 5 mL      5 mL Oral ONCE 19 0742      19 0745  iohexol (OMNIPAQUE) solution 140 mL      140 mL Oral ONCE 19 0744 19 0822     12/29/19 2100  perflutren diluted 1mL to 2mL with saline (OPTISON) diluted injection 2 mL      2 mL Intravenous ONCE 12/29/19 2054 12/29/19 2055 12/29/19 1502  iopamidol (ISOVUE-370) solution 135 mL      135 mL Intravenous ONCE 12/29/19 1501 12/29/19 1621          Interpretation of levels and current regimen:  Trough level is  Subtherapeutic; however, not at steady-state.    Has serum creatinine changed > 50% in last 72 hours: Yes    Urine output:  diminished urine output    Renal Function: Worsening    Plan:  1.  Give Vancomycin 2500 mg IV x 1 dose.   2.  Pharmacy will check trough levels as appropriate in 1-3 Days.    3. Serum creatinine levels will be ordered daily for the first week of therapy and at least twice weekly for subsequent weeks.      Tiny Everett, PharmD, BCPS        .

## 2019-12-31 NOTE — PROGRESS NOTES
Critical Care Progress Note      12/31/2019    Name: Bhavani White MRN#: [id number]   Age: 64 year old YOB: 1955     Hsptl Day# 2  ICU DAY #2    MV DAY #2             Problem List:   Active Problems:    Elevated BMI    Perforated duodenal ulcer (H)    Acute respiratory failure with hypoxia (H)    NSTEMI (non-ST elevated myocardial infarction) (H)    Septic shock (H)           Summary/Hospital Course:     64 year old female with history of chronic pain on methadone, obesity, ANIYA, hypothyroidism, DM and prior abdominal surgery presented to ED this evening with altered mental status with abdominal pain and emesis discovered to have perforated gastric ulcer on imaging.      Per EMS notes patient not feeling well with 3 day prodrome not taking meds (methadone) with new altered mental status this evening prompting her  to call to 911.  Per EMS en route, patient vomited 'fecal material' with suspected aspiration. On arrival in ED, patient noted to be altered tachypneic, thrashing with cool, mottled skin and sats in 40% (?)  prompting immediate intubation for respiratory failure. Hemodynamics initially appropriate with MAP in 70s. EKG with some dynamic changes on initial tracing with new incomplete bundle branch block Labs notable for ED work notable for tropon ~20 , lactic acid 3 , ROLANDO 1.6 and acute hepatitis with LFTs in 700's.  WBC 20K, elevated Hgb 19. CT CAP demonstrated small effusions patchy GGO in RML consistent with aspiration, fat stranding around the pylorus and anterior duodenum suspicious for perforation as small amount of extraluminal gas.      TLC subsequently placed, zosyn started and 3L NS bolused. Surgery contacted in ED with recommendations for conservative management given unclear status of AMI, case also discussed with Cardiology       12/29 - admit > decompensated into shock  4 pressors requirement           Interim History:     12/30  - OR for exploration no overt leak, noted  gastric and duodenal ischemia and hemorrhage, blood evacuated and drain placed     - since weaned off two pressors, remains on lev and vasopressin, vitamin c and stress dose steroids started as well, abx broadened to include antifungal   - hep and ASA stopped earlier   - blood cultures positive staph epi and staph hominis      Assessment and plan :     Bhavani White IS a 64 year old female admitted on 12/29/2019 for abdominal sepsis 2/2 perforated duodenal ulcer, elevated troponins/NSTEMI, acute hypoxemic respiratory failure requiring intubation.      I have personally reviewed the daily labs, imaging studies, cultures and discussed the case with referring physician and consulting physicians.      My assessment and plan by system for this patient is as follows:     Neurology/Psychiatry:   1. Encephalopathy - metabolic, septic - improved,    2. Pain/analgesia, chronic, on chronic methadone,   3. Anxiety 2/2 acute illness  Plan  - continue fentanyl gtt,   - propofol gtt for sedation  - goal RASS -2 , daily sedation holiday     Cardiovascular:   1. Shock, mixed septic, cardiogenic - improving requirement but still pressors dependent   2. NSTEMI  Trop ~20 peak suggest more than demand given degree of rise - suspect multivessel disease and EF with global hypokinesis (stress) , EKG without acute findings  Cardiology consult appreciated.   3.Rhythm - iBBB on serial EKGs  - ? afib vs intermittent PVC similar pattern seen in 2018, no discernible acute changes   Plan  - continue hemodynamic support, MAP >65   - PRN fluid challenge          Pulmonary/Ventilator Management:   1. Acute hypoxemic respiratory failure - 2/2 mixed shock   ? Suspect element of chronic hypoxemia given polycythemia -  2. Aspiration Pneumonia - noted RML GGO  Plan  - continue lung protective ventilation, plan to keep intubated until decision for reexploration  - antibiotics as below   - serial ABG and CXR as indicated     GI and Nutrition :   1.  Perforated ulcer contained s/p ex lap with drainage   2. Bowel ischemia - in setting of shock and hemorrhage   3. Shock liver  - multifactorial - sepsis, meds, fatty liver - stable on serial eval, imaging  Plan  - surgery following - plan for ? Reexploration in 24-48hr    - continue supportive cares  - serial labs and eval     Renal/Fluids/Electrolytes:   1. ROLANDO- from sepsis Cr 2.5 from baseline < 0.8.  Electrolytes within normal,  acid base stable   2. Chronic cystitis - with existing suprapubic catheter  Plan  - monitor function and electrolytes;  as needed with replacement per ICU protocols.   - generally avoid nephrotoxic agents such as NSAID, IV contrast unless specifically required  - adjust medications as needed for renal clearance  - follow I/O's as appropriate.  - maintain hemodynamics, prn volume challenge as appropriate     Infectious Disease:   1. Abdominal sepsis secondary to perforated duodenal ulcer  2. Staph hominis bacteremia -   3. Aspiration pneumonitis, high potential for aspiration PNA  4. Hx of polymicrobial UTI/cystitis vs colonization with suprapubic cath - Hx of MRSA   Plan  - Cont Zosyn 4.5 Q6h, vanco and diflucan    - continue  HAT given new pressor requirement   - repeat blood cultures     Endocrine:   1. H/o DMII  2. Stress induced hyperglycemia  3. Hx of iatrogenic cushings disease - previously on steroids  4. Hypothyroidism  Plan  - ICU insulin protocol, goal sugar <180, insulin gtt  - IV levothyroxine          Hematology/Oncology:   1. Acute blood loss anemia - in setting of perf ulcer/AC, s/p drainage, Hgb 13,   2. Polycythemia- may be suggestive of dehydration, but patient with hematocrit of > 17 on previous admission.    3. Thrombocytopenia - sepsis related  Plan  - monitor daily CBC       IV/Access:   1. Venous access - PIV. TLC   2. Arterial access - radial arterial line as needed  Plan  - central access to be established if patient is requiring vasopressors        ICU  Prophylaxis:   1. DVT: mechanical   2. VAP: HOB 30 degrees, chlorhexidine rinse  3. Stress Ulcer: PPI  4. Restraints: Nonviolent soft two point restraints required and necessary for patient safety and continued cares and good effect as patient continues to pull at necessary lines, tubes despite education and distraction. Will readdress daily.   5. Wound care -per surgery and unit routine   6. Feeding - NPO for medical reasons.  NGT to suction.    7. Family Update:over phone   8. Disposition - ICU.  Prognosis guarded.      Key goals for next 24 hours:   1. Wean pressors as able  2. Continue mechanical ventilation  3. Supportive cares           Key Medications:       ascorbic acid intermittent infusion  1,500 mg Intravenous Q6H     chlorhexidine  15 mL Mouth/Throat Q12H     fluconazole  200 mg Intravenous Q24H     hydrocortisone sodium succinate PF  100 mg Intravenous Q8H     levothyroxine  50 mcg Intravenous Daily     meropenem  1 g Intravenous Q8H     pantoprazole (PROTONIX) IV  40 mg Intravenous Daily     sodium chloride (PF)  3 mL Intracatheter Q8H     thiamine  200 mg Intravenous Daily     vancomycin (VANCOCIN) IV  2,500 mg Intravenous Once     vancomycin place ortega - receiving intermittent dosing  1 each Intravenous See Admin Instructions       angiotensin II (GIAPREZA) 2.5 mg in  mL (adult std) infusion Stopped (12/31/19 1043)     dextrose       fentaNYL 100 mcg/hr (12/31/19 0825)     insulin (regular)       - MEDICATION INSTRUCTIONS -       norepinephrine 0.2 mcg/kg/min (12/31/19 1056)     - MEDICATION INSTRUCTIONS -       phenylephrine Stopped (12/30/19 1429)     propofol (DIPRIVAN) infusion 25 mcg/kg/min (12/31/19 1158)     ACE/ARB/ARNI NOT PRESCRIBED       BETA BLOCKER NOT PRESCRIBED       STATIN NOT PRESCRIBED       sodium chloride 75 mL/hr at 12/30/19 2214     vasopressin (PITRESSIN) infusion ADULT (40 mL) 2.4 Units/hr (12/31/19 1010)               Physical Examination:   Temp:  [98.2  F (36.8   C)-104.5  F (40.3  C)] 99.5  F (37.5  C)  Pulse:  [130-134] 133  Heart Rate:  [] 80  Resp:  [16-20] 16  BP: ()/(59-97) 97/86  MAP:  [65 mmHg-87 mmHg] 69 mmHg  Arterial Line BP: ()/(51-72) 102/54  FiO2 (%):  [70 %-80 %] 70 %  SpO2:  [94 %-100 %] 99 %      Intake/Output Summary (Last 24 hours) at 12/31/2019 1244  Last data filed at 12/31/2019 1200  Gross per 24 hour   Intake 5379.37 ml   Output 2420 ml   Net 2959.37 ml       Wt Readings from Last 4 Encounters:   12/31/19 122 kg (268 lb 15.4 oz)   11/01/19 113.4 kg (250 lb)   10/28/19 121.7 kg (268 lb 3.2 oz)   08/29/19 117.5 kg (259 lb)     Arterial Line BP: ()/(51-72) 102/54  MAP:  [65 mmHg-87 mmHg] 69 mmHg  BP - Mean:  [] 90  Ventilation Mode: CMV/AC  (Continuous Mandatory Ventilation/ Assist Control)  FiO2 (%): 70 %  Rate Set (breaths/minute): 16 breaths/min  Tidal Volume Set (mL): 500 mL  PEEP (cm H2O): 8 cmH2O  Oxygen Concentration (%): 40 %  Resp: 16    Recent Labs   Lab 12/30/19  1303 12/30/19  0045   PH 7.37 7.44   PCO2 36 41   PO2 97 120*   HCO3 21 28   O2PER  --  100       GEN: intubated intermittent mild distress with awakenings   HEENT: head ncat, sclera anicteric, OP patent, trachea midline   PULM: unlabored synchronous with vent, clear anteriorly diminished at bases and laterally   CV/COR: tachycardic irregular S1S2 no gallop,  No rub, no murmur  ABD:  Obese soft  Mild tenderness  Midline wound vac in place , hypoactive bowel sounds, no mass noted hernia  EXT: cool, no edema   NEURO: limited by sedation    SKIN: no obvious rash  LINES: clean, dry intact         Data:   All data and imaging reviewed     ROUTINE ICU LABS (Last four results)  CMP  Recent Labs   Lab 12/31/19  0400 12/30/19  1303 12/30/19  0800 12/30/19  0420 12/29/19  2130 12/29/19  1443 12/29/19  1436   * 145* 145*  --  146*  --  143   POTASSIUM 3.7 4.3 4.7  --  3.9  --  4.2   CHLORIDE 115*  --  111*  --  109  --  106   CO2 19*  --  28  --  28  --  28    ANIONGAP 12  --  6  --  9  --  9   *  --  194*  --  189*  --  207*   BUN 86*  --  69*  --  60*  --  65*   CR 2.50*  --  2.02*  --  1.40*  --  1.56*   GFRESTIMATED 20*  --  26*  --  40* 33* 34*   GFRESTBLACK 23*  --  30*  --  46* 39* 40*   DORY 6.3*  --  8.1*  --  8.6  --  9.8   MAG 1.8  --   --  2.2 2.1  --   --    PHOS  --   --   --   --  2.4*  --   --    PROTTOTAL  --   --  5.7*  --  5.8*  --  6.8   ALBUMIN  --   --  2.3*  --  2.5*  --  2.9*   BILITOTAL  --   --  1.5*  --  1.0  --  1.1   ALKPHOS  --   --  94  --  97  --  108   AST  --   --  1,044*  --  567*  --  715*   ALT  --   --  1,363*  --  738*  --  743*     CBC  Recent Labs   Lab 12/31/19  0400 12/30/19  1303 12/30/19  0800 12/30/19  0420 12/29/19  2130   WBC 11.3*  --  18.8* 19.4* 23.5*   RBC 4.75  --  6.60* 6.45* 6.47*   HGB 13.4 13.6 19.0* 18.4* 17.8*   HCT 42.0  --  58.9* 57.2* 55.2*   MCV 88  --  89 89 85   MCH 28.2  --  28.8 28.5 27.5   MCHC 31.9  --  32.3 32.2 32.2   RDW 16.3*  --  16.8* 16.7* 18.3*   *  --  286 246 289     INR  Recent Labs   Lab 12/29/19  1535 12/29/19  1436   INR 1.61* Canceled, Test credited     Arterial Blood Gas  Recent Labs   Lab 12/30/19  1303 12/30/19  0045   PH 7.37 7.44   PCO2 36 41   PO2 97 120*   HCO3 21 28   O2PER  --  100       All cultures:  Recent Labs   Lab 12/30/19  0425 12/29/19  1507 12/29/19  1435   CULT Culture negative monitoring continues No growth after 2 days Cultured on the 1st day of incubation:  Staphylococcus hominis  *  Critical Value/Significant Value, preliminary result only, called to and read back by  Suhail Escobar RN from [facility] ICU. 12/30/19 at 1202.TV.    Cultured on the 1st day of incubation:  Staphylococcus epidermidis  *  Culture in progress  (Note)  POSITIVE for Staphylococci other than S.aureus, S.epidermidis and  S.lugdunensis, by Backtrace I/O multiplex nucleic acid test.  Coagulase-negative staphylococci are the most common venipuncture or  collection associated  skin CONTAMINANTS grown in blood cultures.  Final identification and antimicrobial susceptibility testing will be  verified by standard methods.    Specimen tested with Alexis Bittarigene multiplex, gram-positive blood culture  nucleic acid test for the following targets: Staph aureus, Staph  epidermidis, Staph lugdunensis, other Staph species, Enterococcus  faecalis, Enterococcus faecium, Streptococcus species, S. agalactiae,  S. anginosus grp., S. pneumoniae, S. pyogenes, Listeria sp., mecA  (methicillin resistance) and Nick/B (vancomycin resistance).    Critical Value/Significant Value called to and read back by FANNY SAMANO RN 12/30/2019 @ [facility]       No results found for this or any previous visit (from the past 24 hour(s)).      Billing: This patient is critically ill: Yes. Total critical care time today 40 min.

## 2019-12-31 NOTE — PLAN OF CARE
Pt requiring 3 max pressors by 9 am, stat Abdominal CT done, showed perforation. 4th pressor added. Pt sent to OR. See OR note. Returned to the floor on 3 pressors but reduced amounts needed. Vented and sedated on propofol and fentanyl. Sinus tach 130's. Wound vac in place. Putting out minimal urine output. Family here and updated by ICU and surgery.

## 2020-01-01 ENCOUNTER — APPOINTMENT (OUTPATIENT)
Dept: GENERAL RADIOLOGY | Facility: CLINIC | Age: 65
DRG: 853 | End: 2020-01-01
Attending: INTERNAL MEDICINE
Payer: MEDICARE

## 2020-01-01 ENCOUNTER — ANESTHESIA (OUTPATIENT)
Dept: SURGERY | Facility: CLINIC | Age: 65
DRG: 853 | End: 2020-01-01
Payer: MEDICARE

## 2020-01-01 LAB
ALBUMIN SERPL-MCNC: 2.6 G/DL (ref 3.4–5)
ALBUMIN SERPL-MCNC: 2.7 G/DL (ref 3.4–5)
ALP SERPL-CCNC: 81 U/L (ref 40–150)
ALP SERPL-CCNC: 82 U/L (ref 40–150)
ALT SERPL W P-5'-P-CCNC: 1041 U/L (ref 0–50)
ALT SERPL W P-5'-P-CCNC: 965 U/L (ref 0–50)
ANION GAP SERPL CALCULATED.3IONS-SCNC: 9 MMOL/L (ref 3–14)
AST SERPL W P-5'-P-CCNC: 433 U/L (ref 0–45)
AST SERPL W P-5'-P-CCNC: 531 U/L (ref 0–45)
BACTERIA SPEC CULT: ABNORMAL
BASOPHILS # BLD AUTO: 0.1 10E9/L (ref 0–0.2)
BASOPHILS # BLD AUTO: 0.1 10E9/L (ref 0–0.2)
BASOPHILS NFR BLD AUTO: 0.2 %
BASOPHILS NFR BLD AUTO: 0.3 %
BILIRUB SERPL-MCNC: 1.1 MG/DL (ref 0.2–1.3)
BILIRUB SERPL-MCNC: 1.3 MG/DL (ref 0.2–1.3)
BUN SERPL-MCNC: 66 MG/DL (ref 7–30)
BUN SERPL-MCNC: 75 MG/DL (ref 7–30)
CA-I BLD-MCNC: 3.3 MG/DL (ref 4.4–5.2)
CALCIUM SERPL-MCNC: 6.2 MG/DL (ref 8.5–10.1)
CALCIUM SERPL-MCNC: 6.7 MG/DL (ref 8.5–10.1)
CHLORIDE SERPL-SCNC: 121 MMOL/L (ref 94–109)
CHLORIDE SERPL-SCNC: 122 MMOL/L (ref 94–109)
CO2 SERPL-SCNC: 21 MMOL/L (ref 20–32)
CO2 SERPL-SCNC: 23 MMOL/L (ref 20–32)
CREAT SERPL-MCNC: 1.72 MG/DL (ref 0.52–1.04)
CREAT SERPL-MCNC: 1.93 MG/DL (ref 0.52–1.04)
DIFFERENTIAL METHOD BLD: ABNORMAL
DIFFERENTIAL METHOD BLD: ABNORMAL
EOSINOPHIL # BLD AUTO: 0 10E9/L (ref 0–0.7)
EOSINOPHIL # BLD AUTO: 0 10E9/L (ref 0–0.7)
EOSINOPHIL NFR BLD AUTO: 0 %
EOSINOPHIL NFR BLD AUTO: 0 %
ERYTHROCYTE [DISTWIDTH] IN BLOOD BY AUTOMATED COUNT: 16.4 % (ref 10–15)
ERYTHROCYTE [DISTWIDTH] IN BLOOD BY AUTOMATED COUNT: 16.7 % (ref 10–15)
GFR SERPL CREATININE-BSD FRML MDRD: 27 ML/MIN/{1.73_M2}
GFR SERPL CREATININE-BSD FRML MDRD: 31 ML/MIN/{1.73_M2}
GLUCOSE BLDC GLUCOMTR-MCNC: 107 MG/DL (ref 70–99)
GLUCOSE BLDC GLUCOMTR-MCNC: 108 MG/DL (ref 70–99)
GLUCOSE BLDC GLUCOMTR-MCNC: 110 MG/DL (ref 70–99)
GLUCOSE BLDC GLUCOMTR-MCNC: 112 MG/DL (ref 70–99)
GLUCOSE BLDC GLUCOMTR-MCNC: 112 MG/DL (ref 70–99)
GLUCOSE BLDC GLUCOMTR-MCNC: 114 MG/DL (ref 70–99)
GLUCOSE BLDC GLUCOMTR-MCNC: 115 MG/DL (ref 70–99)
GLUCOSE BLDC GLUCOMTR-MCNC: 119 MG/DL (ref 70–99)
GLUCOSE BLDC GLUCOMTR-MCNC: 126 MG/DL (ref 70–99)
GLUCOSE BLDC GLUCOMTR-MCNC: 129 MG/DL (ref 70–99)
GLUCOSE BLDC GLUCOMTR-MCNC: 129 MG/DL (ref 70–99)
GLUCOSE BLDC GLUCOMTR-MCNC: 131 MG/DL (ref 70–99)
GLUCOSE BLDC GLUCOMTR-MCNC: 132 MG/DL (ref 70–99)
GLUCOSE BLDC GLUCOMTR-MCNC: 143 MG/DL (ref 70–99)
GLUCOSE SERPL-MCNC: 126 MG/DL (ref 70–99)
GLUCOSE SERPL-MCNC: 152 MG/DL (ref 70–99)
HCT VFR BLD AUTO: 37.9 % (ref 35–47)
HCT VFR BLD AUTO: 38.5 % (ref 35–47)
HGB BLD-MCNC: 12.3 G/DL (ref 11.7–15.7)
HGB BLD-MCNC: 12.4 G/DL (ref 11.7–15.7)
IMM GRANULOCYTES # BLD: 0.4 10E9/L (ref 0–0.4)
IMM GRANULOCYTES # BLD: 0.4 10E9/L (ref 0–0.4)
IMM GRANULOCYTES NFR BLD: 1.6 %
IMM GRANULOCYTES NFR BLD: 1.7 %
LACTATE BLD-SCNC: 1.7 MMOL/L (ref 0.7–2)
LACTATE BLD-SCNC: 1.8 MMOL/L (ref 0.7–2)
LYMPHOCYTES # BLD AUTO: 0.5 10E9/L (ref 0.8–5.3)
LYMPHOCYTES # BLD AUTO: 0.7 10E9/L (ref 0.8–5.3)
LYMPHOCYTES NFR BLD AUTO: 2.1 %
LYMPHOCYTES NFR BLD AUTO: 3.1 %
MCH RBC QN AUTO: 27.7 PG (ref 26.5–33)
MCH RBC QN AUTO: 27.8 PG (ref 26.5–33)
MCHC RBC AUTO-ENTMCNC: 32.2 G/DL (ref 31.5–36.5)
MCHC RBC AUTO-ENTMCNC: 32.5 G/DL (ref 31.5–36.5)
MCV RBC AUTO: 86 FL (ref 78–100)
MCV RBC AUTO: 86 FL (ref 78–100)
MONOCYTES # BLD AUTO: 1.2 10E9/L (ref 0–1.3)
MONOCYTES # BLD AUTO: 1.8 10E9/L (ref 0–1.3)
MONOCYTES NFR BLD AUTO: 5.3 %
MONOCYTES NFR BLD AUTO: 8 %
NEUTROPHILS # BLD AUTO: 19.4 10E9/L (ref 1.6–8.3)
NEUTROPHILS # BLD AUTO: 21 10E9/L (ref 1.6–8.3)
NEUTROPHILS NFR BLD AUTO: 86.9 %
NEUTROPHILS NFR BLD AUTO: 90.8 %
NRBC # BLD AUTO: 0.3 10*3/UL
NRBC # BLD AUTO: 0.4 10*3/UL
NRBC BLD AUTO-RTO: 1 /100
NRBC BLD AUTO-RTO: 2 /100
PLATELET # BLD AUTO: 140 10E9/L (ref 150–450)
PLATELET # BLD AUTO: 150 10E9/L (ref 150–450)
POTASSIUM SERPL-SCNC: 3.6 MMOL/L (ref 3.4–5.3)
POTASSIUM SERPL-SCNC: 3.8 MMOL/L (ref 3.4–5.3)
PROT SERPL-MCNC: 5 G/DL (ref 6.8–8.8)
PROT SERPL-MCNC: 5.1 G/DL (ref 6.8–8.8)
RBC # BLD AUTO: 4.42 10E12/L (ref 3.8–5.2)
RBC # BLD AUTO: 4.48 10E12/L (ref 3.8–5.2)
SODIUM SERPL-SCNC: 149 MMOL/L (ref 133–144)
SODIUM SERPL-SCNC: 151 MMOL/L (ref 133–144)
SODIUM SERPL-SCNC: 152 MMOL/L (ref 133–144)
SPECIMEN SOURCE: ABNORMAL
VANCOMYCIN SERPL-MCNC: 21.5 MG/L
WBC # BLD AUTO: 22.4 10E9/L (ref 4–11)
WBC # BLD AUTO: 23.2 10E9/L (ref 4–11)

## 2020-01-01 PROCEDURE — 94003 VENT MGMT INPAT SUBQ DAY: CPT

## 2020-01-01 PROCEDURE — 25000128 H RX IP 250 OP 636: Performed by: PHYSICIAN ASSISTANT

## 2020-01-01 PROCEDURE — 36000056 ZZH SURGERY LEVEL 3 1ST 30 MIN: Performed by: SURGERY

## 2020-01-01 PROCEDURE — 0DH60UZ INSERTION OF FEEDING DEVICE INTO STOMACH, OPEN APPROACH: ICD-10-PCS | Performed by: SURGERY

## 2020-01-01 PROCEDURE — 25000566 ZZH SEVOFLURANE, EA 15 MIN: Performed by: SURGERY

## 2020-01-01 PROCEDURE — 25800030 ZZH RX IP 258 OP 636: Performed by: INTERNAL MEDICINE

## 2020-01-01 PROCEDURE — 37000008 ZZH ANESTHESIA TECHNICAL FEE, 1ST 30 MIN: Performed by: SURGERY

## 2020-01-01 PROCEDURE — 25800030 ZZH RX IP 258 OP 636: Performed by: SURGERY

## 2020-01-01 PROCEDURE — 25000125 ZZHC RX 250: Performed by: PHYSICIAN ASSISTANT

## 2020-01-01 PROCEDURE — 40000986 XR CHEST PORT 1 VW

## 2020-01-01 PROCEDURE — 80053 COMPREHEN METABOLIC PANEL: CPT | Performed by: INTERNAL MEDICINE

## 2020-01-01 PROCEDURE — 0DHA0UZ INSERTION OF FEEDING DEVICE INTO JEJUNUM, OPEN APPROACH: ICD-10-PCS | Performed by: SURGERY

## 2020-01-01 PROCEDURE — 25000128 H RX IP 250 OP 636: Performed by: INTERNAL MEDICINE

## 2020-01-01 PROCEDURE — 37000009 ZZH ANESTHESIA TECHNICAL FEE, EACH ADDTL 15 MIN: Performed by: SURGERY

## 2020-01-01 PROCEDURE — 25000131 ZZH RX MED GY IP 250 OP 636 PS 637: Mod: GY | Performed by: INTERNAL MEDICINE

## 2020-01-01 PROCEDURE — 0DQ90ZZ REPAIR DUODENUM, OPEN APPROACH: ICD-10-PCS | Performed by: SURGERY

## 2020-01-01 PROCEDURE — 49002 REOPENING OF ABDOMEN: CPT | Mod: AS | Performed by: PHYSICIAN ASSISTANT

## 2020-01-01 PROCEDURE — 44310 ILEOSTOMY/JEJUNOSTOMY: CPT | Mod: 78 | Performed by: SURGERY

## 2020-01-01 PROCEDURE — 80202 ASSAY OF VANCOMYCIN: CPT | Performed by: INTERNAL MEDICINE

## 2020-01-01 PROCEDURE — 36000058 ZZH SURGERY LEVEL 3 EA 15 ADDTL MIN: Performed by: SURGERY

## 2020-01-01 PROCEDURE — 40000008 ZZH STATISTIC AIRWAY CARE

## 2020-01-01 PROCEDURE — 40000275 ZZH STATISTIC RCP TIME EA 10 MIN

## 2020-01-01 PROCEDURE — 25800030 ZZH RX IP 258 OP 636: Performed by: PHYSICIAN ASSISTANT

## 2020-01-01 PROCEDURE — C9113 INJ PANTOPRAZOLE SODIUM, VIA: HCPCS | Performed by: INTERNAL MEDICINE

## 2020-01-01 PROCEDURE — 25800030 ZZH RX IP 258 OP 636: Performed by: OBSTETRICS & GYNECOLOGY

## 2020-01-01 PROCEDURE — 27210794 ZZH OR GENERAL SUPPLY STERILE: Performed by: SURGERY

## 2020-01-01 PROCEDURE — 25000125 ZZHC RX 250: Performed by: SURGERY

## 2020-01-01 PROCEDURE — 25800030 ZZH RX IP 258 OP 636: Performed by: NURSE ANESTHETIST, CERTIFIED REGISTERED

## 2020-01-01 PROCEDURE — 20000003 ZZH R&B ICU

## 2020-01-01 PROCEDURE — 84295 ASSAY OF SERUM SODIUM: CPT | Performed by: INTERNAL MEDICINE

## 2020-01-01 PROCEDURE — 25000131 ZZH RX MED GY IP 250 OP 636 PS 637: Mod: GY | Performed by: PHYSICIAN ASSISTANT

## 2020-01-01 PROCEDURE — 99291 CRITICAL CARE FIRST HOUR: CPT | Mod: 24 | Performed by: INTERNAL MEDICINE

## 2020-01-01 PROCEDURE — 25000125 ZZHC RX 250: Performed by: INTERNAL MEDICINE

## 2020-01-01 PROCEDURE — 85025 COMPLETE CBC W/AUTO DIFF WBC: CPT | Performed by: INTERNAL MEDICINE

## 2020-01-01 PROCEDURE — 0WQF0ZZ REPAIR ABDOMINAL WALL, OPEN APPROACH: ICD-10-PCS | Performed by: SURGERY

## 2020-01-01 PROCEDURE — 25000125 ZZHC RX 250: Performed by: NURSE ANESTHETIST, CERTIFIED REGISTERED

## 2020-01-01 PROCEDURE — 25000128 H RX IP 250 OP 636: Performed by: OBSTETRICS & GYNECOLOGY

## 2020-01-01 PROCEDURE — 49002 REOPENING OF ABDOMEN: CPT | Mod: 78 | Performed by: SURGERY

## 2020-01-01 PROCEDURE — 82330 ASSAY OF CALCIUM: CPT | Performed by: INTERNAL MEDICINE

## 2020-01-01 PROCEDURE — 43830 GSTRST OPEN WO CONSTJ TUBE: CPT | Mod: 78 | Performed by: SURGERY

## 2020-01-01 PROCEDURE — 83605 ASSAY OF LACTIC ACID: CPT | Performed by: INTERNAL MEDICINE

## 2020-01-01 PROCEDURE — 00000146 ZZHCL STATISTIC GLUCOSE BY METER IP

## 2020-01-01 PROCEDURE — 25000128 H RX IP 250 OP 636: Performed by: SURGERY

## 2020-01-01 RX ORDER — MAGNESIUM HYDROXIDE 1200 MG/15ML
LIQUID ORAL PRN
Status: DISCONTINUED | OUTPATIENT
Start: 2020-01-01 | End: 2020-01-01 | Stop reason: HOSPADM

## 2020-01-01 RX ORDER — SODIUM CHLORIDE, SODIUM LACTATE, POTASSIUM CHLORIDE, CALCIUM CHLORIDE 600; 310; 30; 20 MG/100ML; MG/100ML; MG/100ML; MG/100ML
INJECTION, SOLUTION INTRAVENOUS CONTINUOUS PRN
Status: DISCONTINUED | OUTPATIENT
Start: 2020-01-01 | End: 2020-01-01

## 2020-01-01 RX ORDER — DEXTROSE MONOHYDRATE 50 MG/ML
INJECTION, SOLUTION INTRAVENOUS CONTINUOUS
Status: DISCONTINUED | OUTPATIENT
Start: 2020-01-01 | End: 2020-01-06

## 2020-01-01 RX ADMIN — ASCORBIC ACID 1500 MG: 500 INJECTION, SOLUTION INTRAMUSCULAR; INTRAVENOUS; SUBCUTANEOUS at 07:31

## 2020-01-01 RX ADMIN — DEXTROSE MONOHYDRATE: 50 INJECTION, SOLUTION INTRAVENOUS at 06:12

## 2020-01-01 RX ADMIN — SODIUM CHLORIDE, POTASSIUM CHLORIDE, SODIUM LACTATE AND CALCIUM CHLORIDE: 600; 310; 30; 20 INJECTION, SOLUTION INTRAVENOUS at 10:32

## 2020-01-01 RX ADMIN — MEROPENEM 1 G: 1 INJECTION, POWDER, FOR SOLUTION INTRAVENOUS at 16:00

## 2020-01-01 RX ADMIN — THIAMINE HYDROCHLORIDE 200 MG: 100 INJECTION, SOLUTION INTRAMUSCULAR; INTRAVENOUS at 08:23

## 2020-01-01 RX ADMIN — FLUCONAZOLE 200 MG: 2 INJECTION, SOLUTION INTRAVENOUS at 08:23

## 2020-01-01 RX ADMIN — HYDROCORTISONE SODIUM SUCCINATE 100 MG: 100 INJECTION, POWDER, FOR SOLUTION INTRAMUSCULAR; INTRAVENOUS at 23:50

## 2020-01-01 RX ADMIN — PROPOFOL 25 MCG/KG/MIN: 10 INJECTION, EMULSION INTRAVENOUS at 01:38

## 2020-01-01 RX ADMIN — MEROPENEM 1 G: 1 INJECTION, POWDER, FOR SOLUTION INTRAVENOUS at 00:15

## 2020-01-01 RX ADMIN — CISATRACURIUM BESYLATE 6 MG: 2 INJECTION INTRAVENOUS at 11:16

## 2020-01-01 RX ADMIN — HYDROCORTISONE SODIUM SUCCINATE 100 MG: 100 INJECTION, POWDER, FOR SOLUTION INTRAMUSCULAR; INTRAVENOUS at 00:15

## 2020-01-01 RX ADMIN — ASCORBIC ACID 1500 MG: 500 INJECTION, SOLUTION INTRAMUSCULAR; INTRAVENOUS; SUBCUTANEOUS at 02:02

## 2020-01-01 RX ADMIN — Medication 0.16 MCG/KG/MIN: at 22:34

## 2020-01-01 RX ADMIN — Medication: at 10:45

## 2020-01-01 RX ADMIN — MEROPENEM 1 G: 1 INJECTION, POWDER, FOR SOLUTION INTRAVENOUS at 23:50

## 2020-01-01 RX ADMIN — VANCOMYCIN HYDROCHLORIDE 2500 MG: 5 INJECTION, POWDER, LYOPHILIZED, FOR SOLUTION INTRAVENOUS at 20:33

## 2020-01-01 RX ADMIN — PANTOPRAZOLE SODIUM 40 MG: 40 INJECTION, POWDER, FOR SOLUTION INTRAVENOUS at 08:23

## 2020-01-01 RX ADMIN — HYDROCORTISONE SODIUM SUCCINATE 100 MG: 100 INJECTION, POWDER, FOR SOLUTION INTRAMUSCULAR; INTRAVENOUS at 16:06

## 2020-01-01 RX ADMIN — PROPOFOL 25 MCG/KG/MIN: 10 INJECTION, EMULSION INTRAVENOUS at 21:52

## 2020-01-01 RX ADMIN — CALCIUM GLUCONATE 3 G: 98 INJECTION, SOLUTION INTRAVENOUS at 06:26

## 2020-01-01 RX ADMIN — VASOPRESSIN 2.4 UNITS/HR: 20 INJECTION INTRAVENOUS at 00:39

## 2020-01-01 RX ADMIN — CHLORHEXIDINE GLUCONATE 15 ML: 1.2 RINSE ORAL at 07:31

## 2020-01-01 RX ADMIN — ASCORBIC ACID 1500 MG: 500 INJECTION, SOLUTION INTRAMUSCULAR; INTRAVENOUS; SUBCUTANEOUS at 14:25

## 2020-01-01 RX ADMIN — PROPOFOL 25 MCG/KG/MIN: 10 INJECTION, EMULSION INTRAVENOUS at 13:09

## 2020-01-01 RX ADMIN — PROPOFOL 25 MCG/KG/MIN: 10 INJECTION, EMULSION INTRAVENOUS at 10:03

## 2020-01-01 RX ADMIN — HYDROCORTISONE SODIUM SUCCINATE 100 MG: 100 INJECTION, POWDER, FOR SOLUTION INTRAMUSCULAR; INTRAVENOUS at 07:30

## 2020-01-01 RX ADMIN — CHLORHEXIDINE GLUCONATE 15 ML: 1.2 RINSE ORAL at 20:42

## 2020-01-01 RX ADMIN — SODIUM CHLORIDE 4 UNITS/HR: 9 INJECTION, SOLUTION INTRAVENOUS at 06:45

## 2020-01-01 RX ADMIN — VASOPRESSIN 2.4 UNITS/HR: 20 INJECTION INTRAVENOUS at 21:37

## 2020-01-01 RX ADMIN — SODIUM CHLORIDE 4 UNITS/HR: 9 INJECTION, SOLUTION INTRAVENOUS at 21:36

## 2020-01-01 RX ADMIN — ASCORBIC ACID 1500 MG: 500 INJECTION, SOLUTION INTRAMUSCULAR; INTRAVENOUS; SUBCUTANEOUS at 20:33

## 2020-01-01 RX ADMIN — MEROPENEM 1 G: 1 INJECTION, POWDER, FOR SOLUTION INTRAVENOUS at 07:51

## 2020-01-01 RX ADMIN — Medication 100 MCG/HR: at 23:15

## 2020-01-01 RX ADMIN — LEVOTHYROXINE SODIUM ANHYDROUS 50 MCG: 100 INJECTION, POWDER, LYOPHILIZED, FOR SOLUTION INTRAVENOUS at 16:00

## 2020-01-01 RX ADMIN — Medication 0.25 MCG/KG/MIN: at 02:23

## 2020-01-01 RX ADMIN — PROPOFOL 25 MCG/KG/MIN: 10 INJECTION, EMULSION INTRAVENOUS at 06:26

## 2020-01-01 RX ADMIN — SODIUM CHLORIDE 4 UNITS/HR: 9 INJECTION, SOLUTION INTRAVENOUS at 14:25

## 2020-01-01 RX ADMIN — SODIUM CHLORIDE 6 UNITS/HR: 9 INJECTION, SOLUTION INTRAVENOUS at 00:45

## 2020-01-01 RX ADMIN — CISATRACURIUM BESYLATE 10 MG: 2 INJECTION INTRAVENOUS at 10:34

## 2020-01-01 ASSESSMENT — ACTIVITIES OF DAILY LIVING (ADL)
ADLS_ACUITY_SCORE: 25

## 2020-01-01 NOTE — BRIEF OP NOTE
[facility]    Brief Operative Note    Pre-operative diagnosis: Duodenal ulcer perforation (H) [K26.5]  Post-operative diagnosis Same as pre-operative diagnosis    Procedure: Procedure(s):  EXPLORATORY LAPAROTOMY  GASTRIC-JEJUNAL FEEDING TUBE INSERTION  SECONDARY ABDOMINAL WOUND CLOSURE  Surgeon: Surgeon(s) and Role:     * Ayan Lopez MD - Primary     * Juan Boyle PA-C - Assisting  Anesthesia: General   Estimated blood loss: 10cc    Abdominal Tubes:   Malecot at RUQ: placed through perforated ulcer into duodenum.    THAI drain: Right abdomen - placed over duodenal perforation    Gastrostomy: LUQ.    THAI drain: Left abdomen - placed over duodenal perforation    Jejunostomy: LLQ.    Specimens: * No specimens in log *  Findings:   Duodenum appeared more viable. Perforation controlled with gastrostomy, malecot and THAI drains.  Complications: None.  Implants: * No implants in log *    Juan Boyle PA-C  Office: [phone]  Pager: [phone]

## 2020-01-01 NOTE — PLAN OF CARE
Per intensivist, wean Angiotensin 2 off and turn levophed up to keep Map greater than 65. Pt grimaces and opens eyes to belly palpation but no other stimulation. Does not track, Vented and sedated on propofol. Tele A-fib controlled around 9 pm. Good UO. Wound vac working properly. Plan for surgery at 10AM if patient is stable. Spouse and sister here in evening and updated on plan of care.

## 2020-01-01 NOTE — PLAN OF CARE
Patient's  called wanting an update. I explained that the patient is still critically ill and requiring pressors to keep her blood pressure within a normal range. She is still sedated and intubated.   The patient's  requests that the MD's call him with an update on the patient's condition this morning. He states he is frustrated with what he feels is a lack of communication from the MD's.   Bladimir Mcdonough RN 5:00 AM 01/01/20

## 2020-01-01 NOTE — ANESTHESIA POSTPROCEDURE EVALUATION
Patient: Bhavani White    Procedure(s):  EXPLORATORY LAPAROTOMY  GASTRIC-JEJUNAL FEEDING TUBE INSERTION  SECONDARY ABDOMINAL WOUND CLOSURE    Diagnosis:Duodenal ulcer perforation (H) [K26.5]  Diagnosis Additional Information: No value filed.    Anesthesia Type:  General    Note:  Anesthesia Post Evaluation    Patient location during evaluation: ICU  Patient participation: Unable to participate in evaluation secondary to underlying medical condition  Post-procedure mental status: sedated.  Pain management: adequate  Airway patency: patent (ETT)  Cardiovascular status: acceptable (on pressors)  Respiratory status: acceptable (on vent)  Hydration status: acceptable  PONV: none     Anesthetic complications: None    Comments: Face to face report to ICU attending Dr. Jackson        Last vitals:  Vitals:    01/01/20 1300 01/01/20 1315 01/01/20 1330   BP:      Pulse:      Resp: 16     Temp: 37.8  C (100  F) 37.7  C (99.9  F) 37.7  C (99.9  F)   SpO2: 98% 98% 98%         Electronically Signed By: Ramesh Aldana MD  January 1, 2020  2:33 PM

## 2020-01-01 NOTE — PROGRESS NOTES
ICU Update    Attempts to contact  for consent for surgery unsuccessful.  Discussed with Dr Lopez - given continue pressor requirement, prior findings of ischemia and rising leukocytosis this AM and need for expeditious evaluation- procedure deemed emergent.   and family previously aware of need for return to OR and plan for this AM. Will continue to attempt to contact family.     Hernan Jackson MD

## 2020-01-01 NOTE — PROGRESS NOTES
Surgery    Patient intubated and sedated.  Weaning pressors.  Bilious drainage from wound VAC.    Abdomen-VAC in place with dark bilious fluid.      A/P  Plan for exploration today.  If no further ischemia would go forward with gastrostomy, jejunostomy, and drainage.  Case was discussed with multiple partners who agree with the surgical treatment.  Multiple attempts were made to contact the  but he was unable to do a verbal consent this morning.  The procedure was explained in great detail to him yesterday and he agreed.  We will go forward with consent as an emergent basis given above.    Ayan Lopez M.D.  [facility]  [phone]

## 2020-01-01 NOTE — PLAN OF CARE
Patient remains intubated and sedated with propofol. Fentanyl drip for pain control.   Patient opens eyes to oral care or touching of her abdomen. She will spasm her feet to pain. No movement of the arms. Pupils brisk and equal to light. Does not follow commands.   Lungs diminished/clear. Scant secretions with ET tube suctioning. Oral cares done Q2hrs.   Able to titrate down on levophed drip some. Vasopressin remains unchanged. See MAR.   Insulin drip Algorithm 4+2units/hr.   Dr. Vega changed the IV maintenance fluid to D5W at 75cc/hr due to elevated sodium and chloride levels with AM labs.   Calcium gluconate 3 grams given IV for low calcium level with AM labs.   Kidney and liver labs improving slightly.   Adequate urine output from suprapubic cath.   Bladimir Mcdonough RN 6:58 AM 01/01/20

## 2020-01-01 NOTE — PROGRESS NOTES
Surgery    1-Malecot at RUQ: placed through perforated ulcer into duodenum- Keep on gravity drainage to nguyen bag     2- Gastrostomy: LUQ- Place on LIS.     3-Jejunostomy: LLQ- Use for Tube feeds only.     4-THAI drain: Left abdomen - placed over duodenal perforation-  Bulb suction    5-THAI drain: Right abdomen - placed over duodenal perforation-Bulb suction      Ayan Lopez M.D.  [facility]  [phone]

## 2020-01-01 NOTE — PROGRESS NOTES
[facility] ICU RESPIRATORY NOTE        Date of Admission: 12/29/2019  Date of Intubation (most recent): 12/29/2019    Reason for Mechanical Ventilation: Respiratory Failure    Number of Days on Mechanical Ventilation: 4    Met Criteria for Spontaneous Breathing Trial: No    Reason for No Spontaneous Breathing Trial: Per MD    Significant Events Today: None    ABG Results:   Recent Labs   Lab 12/30/19  1303 12/30/19  0045   PH 7.37 7.44   PCO2 36 41   PO2 97 120*   HCO3 21 28   O2PER  --  100       Current Vent Settings: Ventilation Mode: CMV/AC  (Continuous Mandatory Ventilation/ Assist Control)  FiO2 (%): 40 %  Rate Set (breaths/minute): 16 breaths/min  Tidal Volume Set (mL): 500 mL  PEEP (cm H2O): 8 cmH2O  Oxygen Concentration (%): 40 %  Resp: 16      Plan: Continue to monitor and assess for weaning readiness    Thu Manriquez, RT

## 2020-01-01 NOTE — PROGRESS NOTES
Critical Care Progress Note      01/01/2020    Name: Bhavani White MRN#: [id number]   Age: 64 year old YOB: 1955     Hsptl Day# 3  ICU DAY #3    MV DAY #3             Problem List:   Principal Problem:    Duodenal ulcer perforation (H)  Active Problems:    Elevated BMI    Perforated duodenal ulcer (H)    Acute respiratory failure with hypoxia (H)    NSTEMI (non-ST elevated myocardial infarction) (H)    Septic shock (H)           Summary/Hospital Course:     64 year old female with history of chronic pain on methadone, obesity, ANIYA, hypothyroidism, DM and prior abdominal surgery presented to ED this evening with altered mental status with abdominal pain and emesis discovered to have perforated gastric ulcer on imaging.      Per EMS notes patient not feeling well with 3 day prodrome not taking meds (methadone) with new altered mental status this evening prompting her  to call to 911.  Per EMS en route, patient vomited 'fecal material' with suspected aspiration. On arrival in ED, patient noted to be altered tachypneic, thrashing with cool, mottled skin and sats in 40% (?)  prompting immediate intubation for respiratory failure. Hemodynamics initially appropriate with MAP in 70s. EKG with some dynamic changes on initial tracing with new incomplete bundle branch block Labs notable for ED work notable for tropon ~20 , lactic acid 3 , ROLANDO 1.6 and acute hepatitis with LFTs in 700's.  WBC 20K, elevated Hgb 19. CT CAP demonstrated small effusions patchy GGO in RML consistent with aspiration, fat stranding around the pylorus and anterior duodenum suspicious for perforation as small amount of extraluminal gas.      TLC subsequently placed, zosyn started and 3L NS bolused. Surgery contacted in ED with recommendations for conservative management given unclear status of AMI, case also discussed with Cardiology       12/29 - admit > decompensated into shock  4 pressors requirement  12/30  - OR for  exploration no overt leak, noted gastric and duodenal ischemia and hemorrhage, blood evacuated and drain placed            Interim History:     12/31 - stabilized hemodynamics but remains on two pressors at similar doses   - overnight opens eyes to touch but not following commands  - insulin drip titrated  - D5 started for hypernatremia      Assessment and plan :     Bhavani White IS a 64 year old female admitted on 12/29/2019 for abdominal sepsis 2/2 perforated duodenal ulcer, elevated troponins/NSTEMI, acute hypoxemic respiratory failure requiring intubation.      I have personally reviewed the daily labs, imaging studies, cultures and discussed the case with referring physician and consulting physicians.      My assessment and plan by system for this patient is as follows:     Neurology/Psychiatry:   1. Encephalopathy - metabolic, septic - stable     2. Pain/analgesia, chronic, on chronic methadone,   3. Anxiety 2/2 acute illness  Plan  - continue fentanyl gtt,   - propofol gtt for sedation  - goal RASS -2 , daily sedation holiday as indicated     Cardiovascular:   1. Shock, mixed septic, cardiogenic - still pressors dependent, responsive to fluid initially, and improved with calcium supplementation  2. NSTEMI /septic cardiomyopathy trop ~20 peak  3.Rhythm - iBBB on serial EKGs  - ? afib vs intermittent PVC similar pattern seen in 2018, no discernible acute changes   Plan  - continue hemodynamic support, MAP >65   - PRN fluid challenge as indicated        Pulmonary/Ventilator Management:   1. Acute hypoxemic respiratory failure - 2/2 mixed shock   ? Suspect element of chronic hypoxemia given polycythemia - stable ventilatory requirements and oxygenation  2. Aspiration Pneumonia - noted RML GGO  Plan  - continue lung protective ventilation,   - antibiotics as below   - serial ABG and CXR as indicated     GI and Nutrition :   1. Perforated ulcer contained s/p ex lap with drainage   2. Bowel ischemia - in setting  of shock and hemorrhage   3. Shock liver  - multifactorial - sepsis, meds, fatty liver - stable on serial eval, imaging  Plan  - surgery following - plan for reexploration today  - continue supportive cares  - serial labs and eval     Renal/Fluids/Electrolytes:   1. ROLANDO- from sepsis Cr 2.5 peak - downtrending now (baseline < 0.8).  Electrolytes within normal,  acid base stable   2. Hypernatremia - related to volume, D5 started  2. Chronic cystitis - with existing suprapubic catheter  Plan  - monitor function and electrolytes;  as needed with replacement per ICU protocols.   - generally avoid nephrotoxic agents such as NSAID, IV contrast unless specifically required  - adjust medications as needed for renal clearance  - follow I/O's as appropriate.  - maintain hemodynamics, prn volume challenge as appropriate     Infectious Disease:   1. Abdominal sepsis secondary to perforated duodenal ulcer; zosyn started > broadened to meropenem  2. Staph hominis bacteremia -   3. Aspiration pneumonitis, high potential for aspiration PNA  4. Hx of polymicrobial UTI/cystitis vs colonization with suprapubic cath - Hx of MRSA   Plan  - Cont meropenem vanco and diflucan  (pending repeat eval katarzyna discontinue anti fungal first)   - continue  HAT given continued pressor requirement   - FUP repeat blood cultures     Endocrine:   1. H/o DMII  2. Stress induced hyperglycemia  3. Hx of iatrogenic cushings disease - previously on steroids  4. Hypothyroidism  Plan  - ICU insulin protocol, goal sugar <180, insulin gtt  - IV levothyroxine  - stress dose steroids        Hematology/Oncology:   1. Acute blood loss anemia - in setting of perf ulcer/AC, s/p drainage, Hgb 13,   2. Polycythemia- may be suggestive of dehydration, but patient with hematocrit of > 17 on previous admission.    3. Thrombocytopenia - sepsis related, stable   Plan  - monitor daily CBC       IV/Access:   1. Venous access - PIV. TLC   2. Arterial access - radial arterial line  as needed  Plan  - central access to be established if patient is requiring vasopressors        ICU Prophylaxis:   1. DVT: mechanical   2. VAP: HOB 30 degrees, chlorhexidine rinse  3. Stress Ulcer: PPI  4. Restraints: Nonviolent soft two point restraints required and necessary for patient safety and continued cares and good effect as patient continues to pull at necessary lines, tubes despite education and distraction. Will readdress daily.   5. Wound care -per surgery and unit routine   6. Feeding - NPO for medical reasons. NGT to suction.    7. Family Update:over phone   8. Disposition - ICU.  Prognosis guarded.      Key goals for next 24 hours:   1. Wean pressors as able  2. Continue mechanical ventilation  3. OR for reexploration  4. Serial NA          Key Medications:       ascorbic acid intermittent infusion  1,500 mg Intravenous Q6H     chlorhexidine  15 mL Mouth/Throat Q12H     fluconazole  200 mg Intravenous Q24H     hydrocortisone sodium succinate PF  100 mg Intravenous Q8H     levothyroxine  50 mcg Intravenous Daily     meropenem  1 g Intravenous Q8H     pantoprazole (PROTONIX) IV  40 mg Intravenous Daily     sodium chloride (PF)  3 mL Intracatheter Q8H     thiamine  200 mg Intravenous Daily     vancomycin place ortega - receiving intermittent dosing  1 each Intravenous See Admin Instructions       dextrose       D5W 75 mL/hr at 01/01/20 0612     fentaNYL 100 mcg/hr (01/01/20 0741)     insulin (regular) 4 Units/hr (01/01/20 0741)     - MEDICATION INSTRUCTIONS -       norepinephrine 0.2 mcg/kg/min (01/01/20 0748)     - MEDICATION INSTRUCTIONS -       propofol (DIPRIVAN) infusion 25 mcg/kg/min (01/01/20 0741)     ACE/ARB/ARNI NOT PRESCRIBED       BETA BLOCKER NOT PRESCRIBED       STATIN NOT PRESCRIBED       vasopressin (PITRESSIN) infusion ADULT (40 mL) 2.4 Units/hr (01/01/20 0741)               Physical Examination:   Temp:  [97.7  F (36.5  C)-101.2  F (38.4  C)] 97.7  F (36.5  C)  Heart Rate:  []  66  Resp:  [16] 16  MAP:  [64 mmHg-115 mmHg] 88 mmHg  Arterial Line BP: ()/(50-83) 138/69  FiO2 (%):  [40 %] 40 %  SpO2:  [93 %-100 %] 98 %      Intake/Output Summary (Last 24 hours) at 12/31/2019 1244  Last data filed at 12/31/2019 1200  Gross per 24 hour   Intake 5379.37 ml   Output 2420 ml   Net 2959.37 ml       Wt Readings from Last 4 Encounters:   01/01/20 124 kg (273 lb 5.9 oz)   11/01/19 113.4 kg (250 lb)   10/28/19 121.7 kg (268 lb 3.2 oz)   08/29/19 117.5 kg (259 lb)     Arterial Line BP: ()/(50-83) 138/69  MAP:  [64 mmHg-115 mmHg] 88 mmHg  Ventilation Mode: CMV/AC  (Continuous Mandatory Ventilation/ Assist Control)  FiO2 (%): 40 %  Rate Set (breaths/minute): 16 breaths/min  Tidal Volume Set (mL): 500 mL  PEEP (cm H2O): 8 cmH2O  Oxygen Concentration (%): 40 %  Resp: 16    Recent Labs   Lab 12/30/19  1303 12/30/19  0045   PH 7.37 7.44   PCO2 36 41   PO2 97 120*   HCO3 21 28   O2PER  --  100       GEN: intubated intermittent mild distress with awakenings, not following commands   HEENT: head ncat, sclera anicteric, OP patent, trachea midline   PULM: unlabored synchronous with vent, clear anteriorly diminished at bases and laterally   CV/COR: tachycardic irregular S1S2 no gallop,  No rub, no murmur  ABD:  Obese soft  Mild tenderness  Midline wound vac in place , hypoactive bowel sounds, no mass noted hernia  EXT: cool, no edema   NEURO: limited by sedation    SKIN: no obvious rash  LINES: clean, dry intact         Data:   All data and imaging reviewed     ROUTINE ICU LABS (Last four results)  CMP  Recent Labs   Lab 01/01/20  0415 12/31/19  1315 12/31/19  0400 12/30/19  1303 12/30/19  0800 12/30/19  0420 12/29/19  2130   * 149* 146* 145* 145*  --  146*   POTASSIUM 3.6 3.8 3.7 4.3 4.7  --  3.9   CHLORIDE 121* 119* 115*  --  111*  --  109   CO2 21 21 19*  --  28  --  28   ANIONGAP 9 9 12  --  6  --  9   * 290* 285*  --  194*  --  189*   BUN 75* 80* 86*  --  69*  --  60*   CR 1.93* 2.36*  2.50*  --  2.02*  --  1.40*   GFRESTIMATED 27* 21* 20*  --  26*  --  40*   GFRESTBLACK 31* 25* 23*  --  30*  --  46*   DORY 6.2* 6.3* 6.3*  --  8.1*  --  8.6   MAG  --   --  1.8  --   --  2.2 2.1   PHOS  --   --   --   --   --   --  2.4*   PROTTOTAL 5.1* 4.7* 4.8*  --  5.7*  --  5.8*   ALBUMIN 2.7* 2.5* 2.7*  --  2.3*  --  2.5*   BILITOTAL 1.3 1.9* 2.5*  --  1.5*  --  1.0   ALKPHOS 81 73 74  --  94  --  97   * 768* 977*  --  1,044*  --  567*   ALT 1,041* 1,221* 1,263*  --  1,363*  --  738*     CBC  Recent Labs   Lab 01/01/20  0415 12/31/19  1315 12/31/19  0400 12/30/19  1303 12/30/19  0800   WBC 22.4* 16.1* 11.3*  --  18.8*   RBC 4.48 4.60 4.75  --  6.60*   HGB 12.4 12.8 13.4 13.6 19.0*   HCT 38.5 40.2 42.0  --  58.9*   MCV 86 87 88  --  89   MCH 27.7 27.8 28.2  --  28.8   MCHC 32.2 31.8 31.9  --  32.3   RDW 16.4* 16.4* 16.3*  --  16.8*    146* 126*  --  286     INR  Recent Labs   Lab 12/29/19  1535 12/29/19  1436   INR 1.61* Canceled, Test credited     Arterial Blood Gas  Recent Labs   Lab 12/30/19  1303 12/30/19  0045   PH 7.37 7.44   PCO2 36 41   PO2 97 120*   HCO3 21 28   O2PER  --  100       All cultures:  Recent Labs   Lab 12/30/19  0425 12/29/19  1507 12/29/19  1435   CULT Culture negative monitoring continues No growth after 3 days Cultured on the 1st day of incubation:  Staphylococcus hominis  *  Critical Value/Significant Value, preliminary result only, called to and read back by  Suhail Escobar RN from [facility] ICU. 12/30/19 at 1202.TV.    Cultured on the 1st day of incubation:  Staphylococcus epidermidis  Susceptibility testing in progress  *  (Note)  POSITIVE for Staphylococci other than S.aureus, S.epidermidis and  S.lugdunensis, by AmberWaveigene multiplex nucleic acid test.  Coagulase-negative staphylococci are the most common venipuncture or  collection associated skin CONTAMINANTS grown in blood cultures.  Final identification and antimicrobial susceptibility testing will  be  verified by standard methods.    Specimen tested with psicofxpigene multiplex, gram-positive blood culture  nucleic acid test for the following targets: Staph aureus, Staph  epidermidis, Staph lugdunensis, other Staph species, Enterococcus  faecalis, Enterococcus faecium, Streptococcus species, S. agalactiae,  S. anginosus grp., S. pneumoniae, S. pyogenes, Listeria sp., mecA  (methicillin resistance) and Nick/B (vancomycin resistance).    Critical Value/Significant Value called to and read back by FANNY SAMANO RN 12/30/2019 @ 2851 BC       No results found for this or any previous visit (from the past 24 hour(s)).      Billing: This patient is critically ill: Yes. Total critical care time today 35 min.

## 2020-01-01 NOTE — SIGNIFICANT EVENT
Spoke with patient's sister-in-law (Dominique White) at length regarding patient's , Sergio, who the patient normally provides care for at home.  Dominique was told this morning by management that Sergio can not be left here at the hospital alone as we can not help to care for him as a visitor.  Dominique seemed to understand this this morning and agreed to stay with patient, but this afternoon, seemed surprised when I reinforced this.  Sergio had soiled himself here while visiting and Dominique was asking us to find him a ride home because he wanted to leave right away but she didn't have a car here (as her  had taken their car to work this morning and left them here).  We informed her that we can not provide services for non-patients.  Meanwhile, we provided Sergio with a pull-up and clean scrub pants to put on and Dominique reached out to one of her adult children who was coming to pick them up shortly.  I reached out to case management regarding the situation who then talked to .  When I told this to Dominique, she expressed that Sergio would not be agreeable to leaving his home to stay in respite care and that they were just looking for rides to bring him back and forth to the hospital each day.  It was reinforced to her that he could not be left here alone.  She agreed that they would not ever leave patient here alone and that she would pass this on to his brother Sarmad, who would most likely be the one bringing him in for visits.  Mangers updated on situation as well as case management/.

## 2020-01-01 NOTE — PROGRESS NOTES
Assessment and Plan:     1.  Septic and cardiogenic shock requiring somewhat less pressor support than yesterday.  2.  Perforated duodenal ulcer due to ischemic enteritis with peritonitis, status post surgical drainage tube placement and lavage.  3.  Acute myocardial infarction, with initial troponin of 19, falling to 12 and stabilizing.  4.  Severe cardiomyopathy, etiology unclear.  This may be due to sepsis, stress cardiomyopathy, or ischemic cardiomyopathy.  Left ventricular ejection fraction estimated at 25%.    5.  Acute kidney injury, stabilizing.  Creatinine decreased from 2.50 to 2.36  6.  Acute hepatic injury, stabilizing.  7.  Respiratory failure requiring mechanical ventilation  8.  Chronic pain syndrome treated with methadone, currently on fentanyl IV infusion.    Recommendations:    Continue supportive measures for blood pressure.   Oxygenation appears adequate. Cardiac function is not a limiting factor.  No further cardiac intervention is recommended at this time.   As she recovers, we will consider additional evaluation and treatment for cardiomyopathy and possible CAD.       TORI REYES MD           Interval History:   Intubated and sedated          Medications:       ascorbic acid intermittent infusion  1,500 mg Intravenous Q6H     chlorhexidine  15 mL Mouth/Throat Q12H     fluconazole  200 mg Intravenous Q24H     hydrocortisone sodium succinate PF  100 mg Intravenous Q8H     levothyroxine  50 mcg Intravenous Daily     meropenem  1 g Intravenous Q8H     pantoprazole (PROTONIX) IV  40 mg Intravenous Daily     sodium chloride (PF)  3 mL Intracatheter Q8H     thiamine  200 mg Intravenous Daily     vancomycin place ortega - receiving intermittent dosing  1 each Intravenous See Admin Instructions            Physical Exam:   Blood pressure 97/86, pulse 133, temperature 99.9  F (37.7  C), resp. rate 16, weight 122 kg (268 lb 15.4 oz), SpO2 97 %, not currently breastfeeding.    Vitals:     12/29/19 1428 12/30/19 0345 12/31/19 0235   Weight: 140 kg (308 lb 10.3 oz) 138.8 kg (306 lb) 122 kg (268 lb 15.4 oz)         Intake/Output Summary (Last 24 hours) at 12/30/2019 1034  Last data filed at 12/30/2019 0600  Gross per 24 hour   Intake 3246.55 ml   Output 1975 ml   Net 1271.55 ml       12/26 1500 - 12/31 1459  In: 26889.22 [I.V.:8103.22]  Out: 4720 [Urine:2720; Drains:900]  Net: 5983.22    Exam:  GENERAL APPEARANCE ADULT: Intubated and sedated, grimaces to abdominal palpation, diaphoretic  RESP: diminished breath sounds  CV: regular rate and rhythm, no murmur, rub, or gallop  ABDOMEN: Grimaces with palpation, no bowel sounds heard  EXTREMITIES: No edema         Data:   LABS (Last four results)  CMP  Recent Labs   Lab 12/31/19  1315 12/31/19  0400 12/30/19  1303 12/30/19  0800 12/30/19  0420 12/29/19  2130   * 146* 145* 145*  --  146*   POTASSIUM 3.8 3.7 4.3 4.7  --  3.9   CHLORIDE 119* 115*  --  111*  --  109   CO2 21 19*  --  28  --  28   ANIONGAP 9 12  --  6  --  9   * 285*  --  194*  --  189*   BUN 80* 86*  --  69*  --  60*   CR 2.36* 2.50*  --  2.02*  --  1.40*   GFRESTIMATED 21* 20*  --  26*  --  40*   GFRESTBLACK 25* 23*  --  30*  --  46*   DORY 6.3* 6.3*  --  8.1*  --  8.6   MAG  --  1.8  --   --  2.2 2.1   PHOS  --   --   --   --   --  2.4*   PROTTOTAL 4.7* 4.8*  --  5.7*  --  5.8*   ALBUMIN 2.5* 2.7*  --  2.3*  --  2.5*   BILITOTAL 1.9* 2.5*  --  1.5*  --  1.0   ALKPHOS 73 74  --  94  --  97   * 977*  --  1,044*  --  567*   ALT 1,221* 1,263*  --  1,363*  --  738*     CBC  Recent Labs   Lab 12/31/19  1315 12/31/19  0400 12/30/19  1303 12/30/19  0800 12/30/19  0420   WBC 16.1* 11.3*  --  18.8* 19.4*   RBC 4.60 4.75  --  6.60* 6.45*   HGB 12.8 13.4 13.6 19.0* 18.4*   HCT 40.2 42.0  --  58.9* 57.2*   MCV 87 88  --  89 89   MCH 27.8 28.2  --  28.8 28.5   MCHC 31.8 31.9  --  32.3 32.2   RDW 16.4* 16.3*  --  16.8* 16.7*   * 126*  --  286 246     INR  Recent Labs   Lab  12/29/19  1535 12/29/19  1436   INR 1.61* Canceled, Test credited     TROPONINS   Lab Results   Component Value Date    TROPI 12.097 () 12/30/2019    TROPI 12.385 () 12/29/2019    TROPI 19.402 () 12/29/2019    TROPI <0.015 12/10/2017    TROPI  07/26/2017     <0.015  The 99th percentile for upper reference range is 0.045 ug/L.  Troponin values in   the range of 0.045 - 0.120 ug/L may be associated with risks of adverse   clinical events.                                                                                                                 TORI REYES MD

## 2020-01-01 NOTE — PROGRESS NOTES
Attempted to call  Sergio to let him know we will need the consent signed before surgery. Will attempt to keep calling

## 2020-01-01 NOTE — PHARMACY-VANCOMYCIN DOSING SERVICE
Pharmacy Vancomycin Note  Date of Service 2020  Patient's  1955   64 year old, female    Indication: MRSA, sepsis, aspiration pneumonia  Goal Trough Level: 15-20 mg/L  Day of Therapy: 3  Current Vancomycin regimen:  Intermittent dosing based on levels    Current estimated CrCl = Estimated Creatinine Clearance: 43.9 mL/min (A) (based on SCr of 1.72 mg/dL (H)).    Creatinine for last 3 days  2019:  2:36 PM Creatinine 1.56 mg/dL;  9:30 PM Creatinine 1.40 mg/dL  2019:  8:00 AM Creatinine 2.02 mg/dL  2019:  4:00 AM Creatinine 2.50 mg/dL;  1:15 PM Creatinine 2.36 mg/dL  2020:  4:15 AM Creatinine 1.93 mg/dL;  1:05 PM Creatinine 1.72 mg/dL    Recent Vancomycin Levels (past 3 days)  2019: 10:35 AM Vancomycin Level 14.8 mg/L  2020:  1:05 PM Vancomycin Level 21.5 mg/L    Vancomycin IV Administrations (past 72 hours)                   vancomycin (VANCOCIN) 2,500 mg in sodium chloride 0.9 % 500 mL intermittent infusion (mg) 2,500 mg New Bag 19 1352                Nephrotoxins and other renal medications (From now, onward)    Start     Dose/Rate Route Frequency Ordered Stop    20  vancomycin (VANCOCIN) 2,500 mg in sodium chloride 0.9 % 500 mL intermittent infusion      2,500 mg  over 2 Hours Intravenous ONCE 20 1421      19 0744  vancomycin place ortega - receiving intermittent dosing      1 each Intravenous SEE ADMIN INSTRUCTIONS 19 0744      19 0600  vasopressin (VASOSTRICT) 40 Units in D5W 40 mL infusion      2.4 Units/hr  2.4 mL/hr  Intravenous CONTINUOUS 19 0550      19 1900  norepinephrine (LEVOPHED) 16 mg in  mL infusion      0.03-0.4 mcg/kg/min × 140 kg  3.9-52.5 mL/hr  Intravenous CONTINUOUS 19 1858               Contrast Orders - past 72 hours (72h ago, onward)    Start     Dose/Rate Route Frequency Ordered Stop    19 0745  diatrizoate meglumine-sodium (GASTROGRAFIN/GASTROVIEW) 66-10 % solution 5 mL   Status:  Discontinued      5 mL Oral ONCE 12/30/19 0742 12/31/19 1212    12/30/19 0745  iohexol (OMNIPAQUE) solution 140 mL      140 mL Oral ONCE 12/30/19 0744 12/30/19 0822    12/29/19 2100  perflutren diluted 1mL to 2mL with saline (OPTISON) diluted injection 2 mL      2 mL Intravenous ONCE 12/29/19 2054 12/29/19 2055    12/29/19 1502  iopamidol (ISOVUE-370) solution 135 mL      135 mL Intravenous ONCE 12/29/19 1501 12/29/19 1621          Interpretation of levels and current regimen:  Trough level is  Therapeutic    Has serum creatinine changed > 50% in last 72 hours: No    Urine output:  good urine output    Renal Function: Improving    Plan:  1. Re-dose with 2500 mg, check 24 hour level.  2. Serum creatinine levels will be ordered daily for the first week of therapy and at least twice weekly for subsequent weeks.      Victoria Archibald, MiyaD        .

## 2020-01-01 NOTE — PROGRESS NOTES
CHANELLE    D: SW called to ICU by Care Coordinator. Per discussion with Care Coordinator, pt is primary caregiver for her spouse. Pt's spouse has been visiting pt at hospital but he is unable to care for himself, independent of pt's assistance.     I: Pt's family has been contacted by nursing staff who reinforced that pt's spouse needs to be supervised by family or friends while visiting hospital. Pt's family plans to continue providing care for pt's spouse and do not feel they would benefit from out-of-home placement (such as respite care) for pt's spouse at this time.     P: SW will remain involved if needs arise.       SARAH Green, [facility]  [phone]

## 2020-01-01 NOTE — ANESTHESIA PREPROCEDURE EVALUATION
Anesthesia Pre-Procedure Evaluation    Patient: Bhavani White   MRN: [id number] : 1955          Preoperative Diagnosis: Duodenal ulcer perforation (H) [K26.5]    Procedure(s):  EXPLORATORY LAPAROTOMY  POSSIBLE GASTROSTOMY AND JEJUNOSTOMY  POSSIBLE CLOSURE  WOUND VAC CHANGE    Past Medical History:   Diagnosis Date     Anxiety      Asthma      Depression      DM (diabetes mellitus) (H)      Frequent UTI      History of blood transfusion      History of ulcer disease 2014    She notes from NSAIDs; nearly . Discussed a hospitalization at [place] for this.     Hypertension      Hypothyroid      Iatrogenic Cushing's disease (H)     off prednisone now     Morbid obesity (H)      MRSA (methicillin resistant staph aureus) culture positive 2012    repeat cx 10/12/2012 no staph mentioned.     Osteoarthritis     multiple joings.     Other chronic pain      Sleep apnea     No longer uses cpap.     Past Surgical History:   Procedure Laterality Date     ABDOMEN SURGERY       C LAMINECTOMY,FACETECTOMY,LUMBAR  1982     C TOTAL KNEE ARTHROPLASTY      left     CYSTOSCOPY N/A 2018    Procedure: CYSTOSCOPY;  Cystoscopy and Botox Injection Into the Bladder and suprapubic tube exchange;  Surgeon: Yoandy Rios MD;  [facility]: [facility]     CYSTOSCOPY N/A 3/15/2019    Procedure: Cystoscopy, suprapubic tube exchange;  Surgeon: Yoandy Rios MD;  Location: [facility]     CYSTOSCOPY FLEXIBLE, CYSTOSTOMY, INSERT TUBE SUPRAPUBIC, COMBINED N/A 2019    Procedure: Cystoscopy, Bladder Botox Injection, Suprapubic Tube Change;  Surgeon: Yoandy Rios MD;  [facility]     CYSTOSCOPY, INTRAVESICAL INJECTION N/A 2017    Procedure: CYSTOSCOPY, INTRAVESICAL INJECTION;  Cystoscopy, Botox Injection Into The Bladder  Latex Allergy ;  Surgeon: Yoandy Rios MD;  Location: [facility] OR     CYSTOSCOPY, INTRAVESICAL INJECTION N/A 3/8/2018    Procedure: CYSTOSCOPY, INTRAVESICAL INJECTION;   Cystoscopy, Botox Injection Into The Bladder and suprapubic catheter exchange;  Surgeon: Yoandy Rios MD;  Location: [facility]     DISCECTOMY, FUSION CERVICAL ANTERIOR THREE+ LEVELS, COMBINED Left 5/3/2016    Procedure: COMBINED DISCECTOMY, FUSION CERVICAL ANTERIOR THREE+ LEVELS;  Surgeon: Jasvir Torres MD;  Location: [facility] OR     GENITOURINARY SURGERY      suprapubic catheter     HERNIA REPAIR  1957    double hernia     HYSTERECTOMY, PAP NO LONGER INDICATED      CELIA and large ovarian tumor removed     INJECT BOTOX N/A 9/21/2018    Procedure: INJECT BOTOX;;  Surgeon: Yoandy Rios MD;  Location: [facility]     INJECT BOTOX N/A 3/15/2019    Procedure: Inject Botox into Bladder;  Surgeon: Yoandy Rios MD;  Location:  OR     SURGICAL HISTORY OF -       left cataract surgery     SURGICAL HISTORY OF -   12/14    right cataract surgery and left laser revision     SURGICAL HISTORY OF -   March 2015    left carpal tunnel release     TONSILLECTOMY  1974       Anesthesia Evaluation     . Pt has had prior anesthetic.     No history of anesthetic complications          ROS/MED HX    ENT/Pulmonary:     (+)sleep apnea, asthma , . .    Neurologic: Comment: Opens eyes      Cardiovascular: Comment: Weaning pressors as able, AT2 stopped overnight, norepi down slightly.    (+) Dyslipidemia, hypertension--CAD, -past MI,-. : . CHF . . :. . Previous cardiac testing Echodate:results:Globally reduced LV function : EF 45%  Moderately reduced RV functiondate: results:ECG reviewed date: results:Sinus tach date: results:          METS/Exercise Tolerance:     Hematologic:         Musculoskeletal:         GI/Hepatic: Comment: Perforated duodenal ulcer s/p ex lap, no leak identified but ischemic bowel noted    (+) Other GI/Hepatic Acute hepatic insufficiency     (-) GERD   Renal/Genitourinary: Comment: Neurogenic bladder    (+) chronic renal disease, type: ARF,       Endo:     (+) type II DM thyroid problem Obesity, .  "     Psychiatric:     (+) psychiatric history anxiety      Infectious Disease: Comment: Septic shock requiring multiple pressors with multi-system organ failure        Malignancy:         Other:                          Physical Exam      Airway   Comment: intubated    Dental     Cardiovascular     PE comment: Sinus arrythmia    Pulmonary    breath sounds clear to auscultation            Lab Results   Component Value Date    WBC 22.4 (H) 01/01/2020    HGB 12.4 01/01/2020    HCT 38.5 01/01/2020     01/01/2020    CRP 30.0 (H) 05/16/2016    SED 11 03/04/2010     (H) 01/01/2020    POTASSIUM 3.6 01/01/2020    CHLORIDE 121 (H) 01/01/2020    CO2 21 01/01/2020    BUN 75 (H) 01/01/2020    CR 1.93 (H) 01/01/2020     (H) 01/01/2020    DORY 6.2 (L) 01/01/2020    PHOS 2.4 (L) 12/29/2019    MAG 1.8 12/31/2019    ALBUMIN 2.7 (L) 01/01/2020    PROTTOTAL 5.1 (L) 01/01/2020    ALT 1,041 (HH) 01/01/2020     (HH) 01/01/2020    ALKPHOS 81 01/01/2020    BILITOTAL 1.3 01/01/2020    LIPASE 98 12/10/2017    AMYLASE 44 01/08/2009    PTT 28 12/29/2019    INR 1.61 (H) 12/29/2019    FIBR 576 (H) 01/19/2009    TSH 0.86 10/28/2019    T4 1.03 08/15/2018       Preop Vitals  BP Readings from Last 3 Encounters:   12/30/19 97/86   11/01/19 98/54   10/28/19 (!) 142/84    Pulse Readings from Last 3 Encounters:   12/30/19 133   11/01/19 82   10/28/19 106      Resp Readings from Last 3 Encounters:   01/01/20 16   11/01/19 16   08/29/19 14    SpO2 Readings from Last 3 Encounters:   01/01/20 100%   11/01/19 95%   10/28/19 95%      Temp Readings from Last 1 Encounters:   01/01/20 36.9  C (98.4  F)    Ht Readings from Last 1 Encounters:   11/01/19 1.638 m (5' 4.5\")      Wt Readings from Last 1 Encounters:   01/01/20 124 kg (273 lb 5.9 oz)    Estimated body mass index is 46.2 kg/m  as calculated from the following:    Height as of 11/1/19: 1.638 m (5' 4.5\").    Weight as of this encounter: 124 kg (273 lb 5.9 oz).       Anesthesia " Plan      History & Physical Review  History and physical reviewed and following examination; no interval change.    ASA Status:  5 .        Plan for General          Postoperative Care      Consents  Anesthetic plan, risks, benefits and alternatives discussed with:  Implied consent/emergency..                 Ramesh Aldana MD

## 2020-01-01 NOTE — OP NOTE
PREOPERATIVE DIAGNOSIS:   Open abdomen and perforated duodenal ulcer      POSTOPERATIVE DIAGNOSIS: Open abdomen and perforated duodenal ulcer      PROCEDURE:   1-Exploratory laparotomy        2-Lysis of adhesions        3-Closure of perforation in second portion of duodenum   with Malecot drain         4-Gastrostomy tube placement        5-Feeding jejunostomy tube placement        6-Delayed primary closure          SURGEON:  Ayan Lopez MD       ASSISTANT: Juan Boyle PA-C      ANESTHESIA:  GET.       COMPLICATIONS:  None.       BLOOD LOSS:  15 cc       FINDINGS:   No further ischemia of the stomach and duodenum.  Some sutures from previous repair patent.  New Malecot drain placed with better closure.  Multiple drains were placed overlying the perforation.  No other new findings.      INDICATIONS: Mrs. White presented in critical condition.  She was found to have a possible contained duodenal perforation.  Unfortunately she worsened overnight and went for emergent exploration.  A very large perforation was found in the second portion of the duodenum.  There were signs of ischemia and she was in critical condition so a wound VAC was applied to send her to the ICU for further resuscitation.  She has responded to therapy and is going back for reexploration.  I explained the risks, benefits, complications including but not limited to bleeding, infection, possible postop hematoma, seroma, bowel or bladder injury, anastomotic dehiscence, continued leakage from perforation, prolonged ICU course, need for additional procedures if any other complications occur.  This was discussed at length with her  who agreed.  Unfortunately, he was unable to be reached prior to the surgery so emergent consent was performed.      DETAILS OF PROCEDURE:  The patient was brought to the operating room per Anesthesia, placed in supine position, intubated without difficulty.  Patient was prepped and draped in the usual fashion  using ChloraPrep. Surgical timeout was then performed to identify correct surgeon, site, procedure and patient.  All in the room were in agreement.  The wound VAC was removed.  This revealed a large amount of bilious fluid in the right upper quadrant.  I explored the area and it appeared that the Malecot tube had become dislodged from the wound VAC.  I inspected the duodenum and stomach and it appeared that the ischemia was greatly improved.  The posterior portion of the duodenum still appeared thin but appeared viable.  The distal gastric ischemia did not appear full-thickness and had improved.  Portions of the sutures that were placed in the perforation had held without leakage.  Dr. Beauchamp was present for an intraoperative consultation and due to her condition and overall tissue quality we agreed that going forward with a drain inside the perforation would be the best method for containment at this point.  A new Malecot drain was placed within the perforation.  The remaining portion of the perforation was closed with multiple 3-0 Vicryl's which held together nicely.  I could not see any new bilious drainage from around the drain site.  Omentum was packed around the tube which held nicely.  Two 19 Dwight drains were placed overlying the perforation site.  Attention was turned to the gastrostomy tube.  A pursestring of 3-0 Vicryl was placed in the body of the stomach.  A gastrotomy was performed and a SEVERIANO gastrostomy tube was placed into the stomach.  The balloon was filled.  It was anchored to the abdominal wall with a 3-0 Vicryl.  No bleeding occurred.  The abdomen was then copiously irrigated with multiple liters of saline until clear.  I then tried to run the small bowel but there were significant omental adhesions.  These were taken down with the LigaSure device.  Eventually the adhesions were freed and I was able to run the bowel from the ligament of Treitz to the terminal ileum.  I then found the ligament of  Treitz and followed distally to perform the jejunostomy.  A pursestring was also placed.  A SEVERIANO jejunostomy tube was also utilized and threaded distally without difficulties.  The balloon was filled with 2 cc of water.  This was not occluding the lumen whatsoever.  The jejunostomy was anchored at multiple points to the anterior abdominal wall with a 3-0 silk suture.  The abdomen was once again irrigated until clear.  There was no bleeding or other abnormalities.  I inspected the drain site which appeared to be containing the bile leak.  More omentum was packed in the right upper quadrant near the perforation site.  The drains were anchored with permanent sutures.  The fascia was closed with a running 0 PDS.  The wound was copiously irrigated with saline and loosely approximated with staples. All sponge, instrument and needle counts were correct.  The patient tolerated the procedure well and transferred to PACU in stable condition.  A physician assistant was necessary for patient positioning, prepping, operating the camera, aiding in retraction and exposure, performing the anastomosis and closure.           JONATHAN RICHTER MD

## 2020-01-02 LAB
ALBUMIN SERPL-MCNC: 2.3 G/DL (ref 3.4–5)
ALP SERPL-CCNC: 89 U/L (ref 40–150)
ALT SERPL W P-5'-P-CCNC: 842 U/L (ref 0–50)
ANION GAP SERPL CALCULATED.3IONS-SCNC: 8 MMOL/L (ref 3–14)
AST SERPL W P-5'-P-CCNC: 391 U/L (ref 0–45)
BACTERIA SPEC CULT: ABNORMAL
BILIRUB DIRECT SERPL-MCNC: 0.7 MG/DL (ref 0–0.2)
BILIRUB SERPL-MCNC: 0.9 MG/DL (ref 0.2–1.3)
BUN SERPL-MCNC: 59 MG/DL (ref 7–30)
CALCIUM SERPL-MCNC: 6.5 MG/DL (ref 8.5–10.1)
CHLORIDE SERPL-SCNC: 118 MMOL/L (ref 94–109)
CO2 SERPL-SCNC: 22 MMOL/L (ref 20–32)
CREAT SERPL-MCNC: 1.47 MG/DL (ref 0.52–1.04)
ERYTHROCYTE [DISTWIDTH] IN BLOOD BY AUTOMATED COUNT: 17.1 % (ref 10–15)
GFR SERPL CREATININE-BSD FRML MDRD: 38 ML/MIN/{1.73_M2}
GLUCOSE BLDC GLUCOMTR-MCNC: 104 MG/DL (ref 70–99)
GLUCOSE BLDC GLUCOMTR-MCNC: 107 MG/DL (ref 70–99)
GLUCOSE BLDC GLUCOMTR-MCNC: 112 MG/DL (ref 70–99)
GLUCOSE BLDC GLUCOMTR-MCNC: 112 MG/DL (ref 70–99)
GLUCOSE BLDC GLUCOMTR-MCNC: 118 MG/DL (ref 70–99)
GLUCOSE BLDC GLUCOMTR-MCNC: 119 MG/DL (ref 70–99)
GLUCOSE BLDC GLUCOMTR-MCNC: 122 MG/DL (ref 70–99)
GLUCOSE BLDC GLUCOMTR-MCNC: 123 MG/DL (ref 70–99)
GLUCOSE BLDC GLUCOMTR-MCNC: 126 MG/DL (ref 70–99)
GLUCOSE BLDC GLUCOMTR-MCNC: 130 MG/DL (ref 70–99)
GLUCOSE BLDC GLUCOMTR-MCNC: 131 MG/DL (ref 70–99)
GLUCOSE BLDC GLUCOMTR-MCNC: 132 MG/DL (ref 70–99)
GLUCOSE BLDC GLUCOMTR-MCNC: 140 MG/DL (ref 70–99)
GLUCOSE BLDC GLUCOMTR-MCNC: 140 MG/DL (ref 70–99)
GLUCOSE BLDC GLUCOMTR-MCNC: 151 MG/DL (ref 70–99)
GLUCOSE BLDC GLUCOMTR-MCNC: 87 MG/DL (ref 70–99)
GLUCOSE BLDC GLUCOMTR-MCNC: 94 MG/DL (ref 70–99)
GLUCOSE SERPL-MCNC: 133 MG/DL (ref 70–99)
HCT VFR BLD AUTO: 36 % (ref 35–47)
HGB BLD-MCNC: 11.8 G/DL (ref 11.7–15.7)
INTERPRETATION ECG - MUSE: NORMAL
Lab: ABNORMAL
MAGNESIUM SERPL-MCNC: 2.2 MG/DL (ref 1.6–2.3)
MCH RBC QN AUTO: 28.2 PG (ref 26.5–33)
MCHC RBC AUTO-ENTMCNC: 32.8 G/DL (ref 31.5–36.5)
MCV RBC AUTO: 86 FL (ref 78–100)
PLATELET # BLD AUTO: 146 10E9/L (ref 150–450)
POTASSIUM SERPL-SCNC: 3.9 MMOL/L (ref 3.4–5.3)
PROT SERPL-MCNC: 4.8 G/DL (ref 6.8–8.8)
RBC # BLD AUTO: 4.19 10E12/L (ref 3.8–5.2)
SODIUM SERPL-SCNC: 148 MMOL/L (ref 133–144)
SPECIMEN SOURCE: ABNORMAL
TRIGL SERPL-MCNC: 191 MG/DL
VANCOMYCIN SERPL-MCNC: 20 MG/L
WBC # BLD AUTO: 26.1 10E9/L (ref 4–11)

## 2020-01-02 PROCEDURE — 00000146 ZZHCL STATISTIC GLUCOSE BY METER IP

## 2020-01-02 PROCEDURE — 99232 SBSQ HOSP IP/OBS MODERATE 35: CPT | Performed by: INTERNAL MEDICINE

## 2020-01-02 PROCEDURE — 25800030 ZZH RX IP 258 OP 636: Performed by: INTERNAL MEDICINE

## 2020-01-02 PROCEDURE — 25000125 ZZHC RX 250: Performed by: PHYSICIAN ASSISTANT

## 2020-01-02 PROCEDURE — 80202 ASSAY OF VANCOMYCIN: CPT | Performed by: INTERNAL MEDICINE

## 2020-01-02 PROCEDURE — 40000275 ZZH STATISTIC RCP TIME EA 10 MIN

## 2020-01-02 PROCEDURE — 25000128 H RX IP 250 OP 636: Performed by: PHYSICIAN ASSISTANT

## 2020-01-02 PROCEDURE — 25800030 ZZH RX IP 258 OP 636: Performed by: PHYSICIAN ASSISTANT

## 2020-01-02 PROCEDURE — 87077 CULTURE AEROBIC IDENTIFY: CPT | Performed by: INTERNAL MEDICINE

## 2020-01-02 PROCEDURE — 94003 VENT MGMT INPAT SUBQ DAY: CPT

## 2020-01-02 PROCEDURE — 40000008 ZZH STATISTIC AIRWAY CARE

## 2020-01-02 PROCEDURE — 25000132 ZZH RX MED GY IP 250 OP 250 PS 637: Mod: GY | Performed by: INTERNAL MEDICINE

## 2020-01-02 PROCEDURE — 83735 ASSAY OF MAGNESIUM: CPT | Performed by: INTERNAL MEDICINE

## 2020-01-02 PROCEDURE — 80076 HEPATIC FUNCTION PANEL: CPT | Performed by: INTERNAL MEDICINE

## 2020-01-02 PROCEDURE — 20000003 ZZH R&B ICU

## 2020-01-02 PROCEDURE — 84478 ASSAY OF TRIGLYCERIDES: CPT | Performed by: INTERNAL MEDICINE

## 2020-01-02 PROCEDURE — 25000128 H RX IP 250 OP 636: Performed by: INTERNAL MEDICINE

## 2020-01-02 PROCEDURE — 80048 BASIC METABOLIC PNL TOTAL CA: CPT | Performed by: INTERNAL MEDICINE

## 2020-01-02 PROCEDURE — C9113 INJ PANTOPRAZOLE SODIUM, VIA: HCPCS | Performed by: PHYSICIAN ASSISTANT

## 2020-01-02 PROCEDURE — 25000131 ZZH RX MED GY IP 250 OP 636 PS 637: Mod: GY | Performed by: PHYSICIAN ASSISTANT

## 2020-01-02 PROCEDURE — 99291 CRITICAL CARE FIRST HOUR: CPT | Mod: 24 | Performed by: INTERNAL MEDICINE

## 2020-01-02 PROCEDURE — 87040 BLOOD CULTURE FOR BACTERIA: CPT | Performed by: INTERNAL MEDICINE

## 2020-01-02 PROCEDURE — 85027 COMPLETE CBC AUTOMATED: CPT | Performed by: INTERNAL MEDICINE

## 2020-01-02 RX ORDER — DEXTROSE MONOHYDRATE 100 MG/ML
INJECTION, SOLUTION INTRAVENOUS CONTINUOUS PRN
Status: DISCONTINUED | OUTPATIENT
Start: 2020-01-02 | End: 2020-01-21 | Stop reason: HOSPADM

## 2020-01-02 RX ORDER — QUETIAPINE FUMARATE 25 MG/1
25 TABLET, FILM COATED ORAL 2 TIMES DAILY
Status: DISCONTINUED | OUTPATIENT
Start: 2020-01-02 | End: 2020-01-06

## 2020-01-02 RX ADMIN — QUETIAPINE 25 MG: 25 TABLET ORAL at 22:00

## 2020-01-02 RX ADMIN — MEROPENEM 1 G: 1 INJECTION, POWDER, FOR SOLUTION INTRAVENOUS at 08:27

## 2020-01-02 RX ADMIN — Medication 0.13 MCG/KG/MIN: at 11:39

## 2020-01-02 RX ADMIN — HYDROCORTISONE SODIUM SUCCINATE 100 MG: 100 INJECTION, POWDER, FOR SOLUTION INTRAMUSCULAR; INTRAVENOUS at 08:30

## 2020-01-02 RX ADMIN — ASCORBIC ACID 1500 MG: 500 INJECTION, SOLUTION INTRAMUSCULAR; INTRAVENOUS; SUBCUTANEOUS at 20:33

## 2020-01-02 RX ADMIN — CHLORHEXIDINE GLUCONATE 15 ML: 1.2 RINSE ORAL at 08:49

## 2020-01-02 RX ADMIN — DEXTROSE MONOHYDRATE: 50 INJECTION, SOLUTION INTRAVENOUS at 11:54

## 2020-01-02 RX ADMIN — CHLORHEXIDINE GLUCONATE 15 ML: 1.2 RINSE ORAL at 20:14

## 2020-01-02 RX ADMIN — PROPOFOL 5 MCG/KG/MIN: 10 INJECTION, EMULSION INTRAVENOUS at 18:28

## 2020-01-02 RX ADMIN — VASOPRESSIN 2.4 UNITS/HR: 20 INJECTION INTRAVENOUS at 22:00

## 2020-01-02 RX ADMIN — QUETIAPINE 25 MG: 25 TABLET ORAL at 12:47

## 2020-01-02 RX ADMIN — PROPOFOL 25 MCG/KG/MIN: 10 INJECTION, EMULSION INTRAVENOUS at 02:57

## 2020-01-02 RX ADMIN — FLUCONAZOLE 200 MG: 2 INJECTION, SOLUTION INTRAVENOUS at 09:22

## 2020-01-02 RX ADMIN — ASCORBIC ACID 1500 MG: 500 INJECTION, SOLUTION INTRAMUSCULAR; INTRAVENOUS; SUBCUTANEOUS at 02:26

## 2020-01-02 RX ADMIN — THIAMINE HYDROCHLORIDE 200 MG: 100 INJECTION, SOLUTION INTRAMUSCULAR; INTRAVENOUS at 09:26

## 2020-01-02 RX ADMIN — VASOPRESSIN 2.4 UNITS/HR: 20 INJECTION INTRAVENOUS at 11:37

## 2020-01-02 RX ADMIN — HYDROCORTISONE SODIUM SUCCINATE 50 MG: 100 INJECTION, POWDER, FOR SOLUTION INTRAMUSCULAR; INTRAVENOUS at 15:59

## 2020-01-02 RX ADMIN — PANTOPRAZOLE SODIUM 40 MG: 40 INJECTION, POWDER, FOR SOLUTION INTRAVENOUS at 08:25

## 2020-01-02 RX ADMIN — SODIUM CHLORIDE 6 UNITS/HR: 9 INJECTION, SOLUTION INTRAVENOUS at 20:01

## 2020-01-02 RX ADMIN — VANCOMYCIN HYDROCHLORIDE 2500 MG: 5 INJECTION, POWDER, LYOPHILIZED, FOR SOLUTION INTRAVENOUS at 22:00

## 2020-01-02 RX ADMIN — Medication 125 MCG/HR: at 22:16

## 2020-01-02 RX ADMIN — ASCORBIC ACID 1500 MG: 500 INJECTION, SOLUTION INTRAMUSCULAR; INTRAVENOUS; SUBCUTANEOUS at 08:29

## 2020-01-02 RX ADMIN — SODIUM CHLORIDE 6 UNITS/HR: 9 INJECTION, SOLUTION INTRAVENOUS at 05:49

## 2020-01-02 RX ADMIN — PROPOFOL 20 MCG/KG/MIN: 10 INJECTION, EMULSION INTRAVENOUS at 08:07

## 2020-01-02 RX ADMIN — MEROPENEM 1 G: 1 INJECTION, POWDER, FOR SOLUTION INTRAVENOUS at 15:59

## 2020-01-02 RX ADMIN — DEXTROSE MONOHYDRATE: 50 INJECTION, SOLUTION INTRAVENOUS at 23:07

## 2020-01-02 RX ADMIN — LEVOTHYROXINE SODIUM ANHYDROUS 50 MCG: 100 INJECTION, POWDER, LYOPHILIZED, FOR SOLUTION INTRAVENOUS at 13:37

## 2020-01-02 RX ADMIN — ASCORBIC ACID 1500 MG: 500 INJECTION, SOLUTION INTRAMUSCULAR; INTRAVENOUS; SUBCUTANEOUS at 13:37

## 2020-01-02 RX ADMIN — SODIUM CHLORIDE 4 UNITS/HR: 9 INJECTION, SOLUTION INTRAVENOUS at 13:29

## 2020-01-02 ASSESSMENT — ACTIVITIES OF DAILY LIVING (ADL)
ADLS_ACUITY_SCORE: 27
ADLS_ACUITY_SCORE: 25
ADLS_ACUITY_SCORE: 27

## 2020-01-02 NOTE — PLAN OF CARE
Went for washout and closure in AM. Came back on same drips, levophed 0.16 and vaso 2.4. Vented on propofol and fentanyl for sedation. Nuero's unchanged. Updated family multiple times throughout the shift.

## 2020-01-02 NOTE — CONSULTS
CLINICAL NUTRITION SERVICES  -  ASSESSMENT NOTE      Recommendations Ordered by Registered Dietitian (RD): Peptamen Intense VHP at 15 mL/hr to provide:  360 kcal, 33 g protein (0.6 g/kg), 27 g CHO, 1 g fiber, 302 mL H2O  Total with Propofol and D5 = 920 kcal (7.5 kcal/kg)  Flushes 60 mL every 4 hours   Future/Additional Recommendations: If above tolerated, recommend eventual goal TF of Peptamen Intense VHP at 45 mL/hr to provide:  1080 kcal, 99 g protein, 82 g CHO, 4 g fiber, 907 mL H2O  Add ProSource 1 pkt BID = 80 kcal and 22 g protein  Total (TF + ProSource + Propofol + D5)= 1720 kcal (14 kcal/kg and 101% needs) and 121 g protein (2.2 g/kg)   Malnutrition: % Weight Loss:  None noted  % Intake:  </= 50% for >/= 5 days (severe malnutrition)  Subcutaneous Fat Loss:  None observed  Muscle Loss:  None observed  Fluid Retention:  Mild 2+ generalized     Malnutrition Diagnosis: Non-Severe malnutrition  In Context of:  Acute illness or injury        REASON FOR ASSESSMENT  Bhavani White is a 64 year old female seen by Registered Dietitian for Provider Order - Registered Dietitian to Assess and Order TF per Medical Nutrition Therapy Protocol (Surgery notes ok for trickle TF)      NUTRITION HISTORY  - Unable to obtain nutrition history as patient intubated and sedated.  - Patient is s/p surgery for perforated duodenal ulcer, bowel ischemia     12/30:  1.  Exploratory laparoscopy with lysis of adhesions.   2.  Exploratory laparotomy with lysis of adhesions for 60 minutes.   3.  Placement of a Malecot drain in ischemic perforation of the first to second portion of the duodenum.   4.  Placement of ABThera abdominal wound VAC.     1/1:  Surgery --> 1-Exploratory laparotomy                                2-Lysis of adhesions                                3-Closure of perforation in second portion of duodenum with Malecot drain                                 4-Gastrostomy tube placement                                5-Feeding  "jejunostomy tube placement                                6-Delayed primary closure      CURRENT NUTRITION ORDERS  Diet Order:     NPO day #5    Current Intake/Tolerance:  N/A      NUTRITION FOCUSED PHYSICAL ASSESSMENT FOR DIAGNOSING MALNUTRITION)  Yes                Observed:    No nutrition-related physical findings observed    Obtained from Chart/Interdisciplinary Team:  Edema - 2+ generalized (mild)    ANTHROPOMETRICS  Height: 5'4\"  Weight: 122 kg (269#)(12/31)  Body mass index is 46.2 kg/m   Weight Status:  Obesity Grade III BMI >40  IBW: 54.5 kg   % IBW: 223%  Weight History:   Wt Readings from Last 10 Encounters:   01/02/20 127 kg (279 lb 15.8 oz)   11/01/19 113.4 kg (250 lb)   10/28/19 121.7 kg (268 lb 3.2 oz)   08/29/19 117.5 kg (259 lb)   03/15/19 117.5 kg (259 lb)   03/11/19 117.7 kg (259 lb 8 oz)   12/13/18 115.2 kg (254 lb)   09/21/18 108 kg (238 lb)   08/15/18 108 kg (238 lb)   05/21/18 111.6 kg (246 lb 1.6 oz)   Overall weight up from [facility]  Weight has fluctuated during this admit - presumably due to fluids       LABS  BUN 59 (H), Cr 1.47 (H)  - ROLANDO   (H)  LFTs elevated - Septic shock kidney and liver     MEDICATIONS  Propofol at 4.2 mL/hr= 111 kcal   D5 containing IVF at 110 mL/hr= 132 g CHO, 449 kcal   Noreip and Vasopressin       ASSESSED NUTRITION NEEDS PER APPROVED PRACTICE GUIDELINES:    Dosing Weight 122 kg (energy) and 54.5 kg (protein)  Estimated Energy Needs: 4155-9490 kcals (11-14 Kcal/Kg)  Justification: obese  Estimated Protein Needs: 110-135 grams protein (2-2.5 g pro/Kg)  Justification: hypercatabolism with critical illness and obesity guidelines   Estimated Fluid Needs: 2097-8839 mL (1 mL/Kcal)  Justification: maintenance    MALNUTRITION:  % Weight Loss:  None noted  % Intake:  </= 50% for >/= 5 days (severe malnutrition)  Subcutaneous Fat Loss:  None observed  Muscle Loss:  None observed  Fluid Retention:  Mild 2+ generalized     Malnutrition Diagnosis: Non-Severe malnutrition  In " Context of:  Acute illness or injury    NUTRITION DIAGNOSIS:  Inadequate protein-energy intake related to NPO, plan to start trickle TF today as evidenced by meeting 0% protein and 40% energy needs from Propofol and D5 containing IVF       NUTRITION INTERVENTIONS  Recommendations / Nutrition Prescription  Peptamen Intense VHP at 15 mL/hr to provide:  360 kcal, 33 g protein (0.6 g/kg), 27 g CHO, 1 g fiber, 302 mL H2O  Total with Propofol and D5 = 920 kcal (7.5 kcal/kg)  Flushes 60 mL every 4 hours  Unable to do Certavite due to iodine allergy    If above tolerated, recommend eventual goal TF of Peptamen Intense VHP at 45 mL/hr to provide:  1080 kcal, 99 g protein, 82 g CHO, 4 g fiber, 907 mL H2O  Add ProSource 1 pkt BID = 80 kcal and 22 g protein  Total (TF + ProSource + Propofol + D5)= 1720 kcal (14 kcal/kg and 101% needs) and 121 g protein (2.2 g/kg)    Implementation  Nutrition education: Not appropriate at this time due to patient condition  EN Composition, EN Schedule, Feeding Tube Flush, and Multivitamin/mineral supplementation:  Orders written to begin TF at 15 mL/hr, do not advance.  Flushes and Certavite as above.  Collaboration and Referral of Nutrition care:  Patient discussed today during interdisciplinary bedside rounds     Nutrition Goals  Patient will achieve goal TF within the next 2-3 days     MONITORING AND EVALUATION:  Progress towards goals will be monitored and evaluated per protocol and Practice Guidelines    Julia Jang RD, LD, CNSC   Clinical Dietitian - [facility]

## 2020-01-02 NOTE — PROGRESS NOTES
Critical Care Progress Note      01/02/2020    Name: Bhavani White MRN#: [id number]   Age: 64 year old YOB: 1955     Hsptl Day# 4  ICU DAY #4    MV DAY #4             Problem List:   Principal Problem:    Duodenal ulcer perforation (H)  Active Problems:    Elevated BMI    Perforated duodenal ulcer (H)    Acute respiratory failure with hypoxia (H)    NSTEMI (non-ST elevated myocardial infarction) (H)    Septic shock (H)           Summary/Hospital Course:     64 year old female with history of chronic pain on methadone, obesity, ANIYA, hypothyroidism, DM and prior abdominal surgery presented to ED this evening with altered mental status with abdominal pain and emesis discovered to have perforated gastric ulcer on imaging.      Per EMS notes patient not feeling well with 3 day prodrome not taking meds (methadone) with new altered mental status this evening prompting her  to call to 911.  Per EMS en route, patient vomited 'fecal material' with suspected aspiration. On arrival in ED, patient noted to be altered tachypneic, thrashing with cool, mottled skin and sats in 40% (?)  prompting immediate intubation for respiratory failure. Hemodynamics initially appropriate with MAP in 70s. EKG with some dynamic changes on initial tracing with new incomplete bundle branch block Labs notable for ED work notable for tropon ~20 , lactic acid 3 , ROLANDO 1.6 and acute hepatitis with LFTs in 700's.  WBC 20K, elevated Hgb 19. CT CAP demonstrated small effusions patchy GGO in RML consistent with aspiration, fat stranding around the pylorus and anterior duodenum suspicious for perforation as small amount of extraluminal gas.      TLC subsequently placed, zosyn started and 3L NS bolused. Surgery contacted in ED with recommendations for conservative management given unclear status of AMI, case also discussed with Cardiology       12/29 - admit > decompensated into shock  4 pressors requirement  12/30  - OR for  exploration no overt leak, noted gastric and duodenal ischemia and hemorrhage, blood evacuated and drain placed   12/31 - stabilized hemodynamics but remains on two pressors at similar doses , D5W for Hyper Na             Interim History:     1/1 - back to OR unable to repair per 2/2 size, drain placed for containment, bowel ischemia improved on visualization, no change in pressor requirements  -  difficult time coping with situation and family request social work consult, concern for him being vulnerable as Bhavani was primary caregiver     - Surgery this AM ok for tube feeds, trickle        Assessment and plan :     Bhavani White IS a 64 year old female admitted on 12/29/2019 for abdominal sepsis 2/2 perforated duodenal ulcer, elevated troponins/NSTEMI, acute hypoxemic respiratory failure requiring intubation.      I have personally reviewed the daily labs, imaging studies, cultures and discussed the case with referring physician and consulting physicians.      My assessment and plan by system for this patient is as follows:     Neurology/Psychiatry:   1. Encephalopathy - metabolic, septic - stable     2. Pain/analgesia, chronic, on chronic methadone,   3. Anxiety 2/2 acute illness  Plan  - continue fentanyl gtt,   - propofol gtt for sedation, wean as able   - goal RASS -2 , daily sedation holiday as indicated  - start seroquel in attempt to wean off propofol     Cardiovascular:   1. Shock, mixed septic, cardiogenic - still pressors dependent, responsive to fluid initially, and improved with calcium supplementation  2. NSTEMI /septic cardiomyopathy trop ~20 peak;  3.Rhythm - iBBB on serial EKGs  - ? afib vs intermittent PVC similar pattern seen in 2018, no discernible acute changes   Plan  - continue hemodynamic support, MAP >65 , wean pressors as able   - PRN fluid challenge as indicated  - defer further cardiac eval given current condition and unable to anticoagulate (intra-abd bleed prior  attempt)        Pulmonary/Ventilator Management:   1. Acute hypoxemic respiratory failure - 2/2 mixed shock - stable vent and O2 requirements  ? Suspect element of chronic hypoxemia given polycythemia - stable ventilatory requirements and oxygenation  2. Aspiration Pneumonia - noted RML GGO  Plan  - continue lung protective ventilation, keep increase PEEP 2/2 habitus and atelectaiss (10 cmH20)  - antibiotics as below   - serial ABG and CXR as indicated     GI and Nutrition :   1. Perforated ulcer contained s/p ex lap with drainage   2. Bowel ischemia - in setting of shock and hemorrhage - improved on 2nd look and hemodynamic support  3. Shock liver  - multifactorial - sepsis, meds, fatty liver - stable on serial eval, imaging, improving   Plan  - surgery following - pos op care and drain management   - continue supportive cares  - serial labs and eval as indicated  - check TAGS given propofol use      Renal/Fluids/Electrolytes:   1. ROLANDO- from sepsis Cr 2.5 peak - downtrending now (baseline < 0.8).  Electrolytes within normal,  acid base stable   2. Hypernatremia - related to volume, D5 started  2. Chronic cystitis - with existing suprapubic catheter  Plan  - monitor function and electrolytes;  as needed with replacement per ICU protocols.   - generally avoid nephrotoxic agents such as NSAID, IV contrast unless specifically required  - adjust medications as needed for renal clearance  - follow I/O's as appropriate.  - maintain hemodynamics, prn volume challenge as appropriate     Infectious Disease:   1. Abdominal sepsis secondary to perforated duodenal ulcer; zosyn started > broadened to meropenem  2. Staph hominis bacteremia - 2/2 above  3. Aspiration pneumonitis, high potential for aspiration PNA  4. Hx of polymicrobial UTI/cystitis vs colonization with suprapubic cath - Hx of MRSA   Plan  - cont meropenem vanco    - continue HAT given continued pressor requirement   - FUP repeat blood cultures     Endocrine:   1.  H/o DMII  2. Stress induced hyperglycemia  3. Hx of iatrogenic cushings disease - previously on steroids  4. Hypothyroidism  Plan  - ICU insulin protocol, goal sugar <180, insulin gtt  - IV levothyroxine  - stress dose steroids 100 q - wean 50 q8        Hematology/Oncology:   1. Acute blood loss anemia - in setting of perf ulcer/AC, s/p drainage, Hgb 13,   2. Polycythemia- may be suggestive of dehydration, but patient with hematocrit of > 17 on previous admission.    3. Thrombocytopenia - sepsis related, stable   Plan  - monitor daily CBC       IV/Access:   1. Venous access - PIV. TLC   2. Arterial access - radial arterial line as needed  Plan  - central access to be established if patient is requiring vasopressors        ICU Prophylaxis:   1. DVT: mechanical   2. VAP: HOB 30 degrees, chlorhexidine rinse  3. Stress Ulcer: PPI  4. Restraints: Nonviolent soft two point restraints required and necessary for patient safety and continued cares and good effect as patient continues to pull at necessary lines, tubes despite education and distraction. Will readdress daily.   5. Wound care -per surgery and unit routine   6. Feeding - NPO for medical reasons. NGT to suction.    7. Family Update:over phone   8. Disposition - ICU.  Prognosis guarded.      Key goals for next 24 hours:   1. Wean pressors as able  2. Continue mechanical ventilation  3. Adjust sedation  4. Continue supportive cares         Key Medications:       ascorbic acid intermittent infusion  1,500 mg Intravenous Q6H     chlorhexidine  15 mL Mouth/Throat Q12H     fluconazole  200 mg Intravenous Q24H     hydrocortisone sodium succinate PF  100 mg Intravenous Q8H     levothyroxine  50 mcg Intravenous Daily     meropenem  1 g Intravenous Q8H     pantoprazole (PROTONIX) IV  40 mg Intravenous Daily     sodium chloride (PF)  3 mL Intracatheter Q8H     thiamine  200 mg Intravenous Daily     vancomycin place ortega - receiving intermittent dosing  1 each Intravenous  See Admin Instructions       dextrose       D5W 110 mL/hr at 01/02/20 0733     fentaNYL 100 mcg/hr (01/02/20 0734)     insulin (regular) 6 Units/hr (01/02/20 1109)     - MEDICATION INSTRUCTIONS -       norepinephrine 0.13 mcg/kg/min (01/02/20 0959)     - MEDICATION INSTRUCTIONS -       propofol (DIPRIVAN) infusion 15 mcg/kg/min (01/02/20 1044)     ACE/ARB/ARNI NOT PRESCRIBED       BETA BLOCKER NOT PRESCRIBED       STATIN NOT PRESCRIBED       vasopressin (PITRESSIN) infusion ADULT (40 mL) 2.4 Units/hr (01/02/20 0732)               Physical Examination:   Temp:  [99.1  F (37.3  C)-100.9  F (38.3  C)] 99.1  F (37.3  C)  Pulse:  [66-82] 66  Heart Rate:  [64-98] 64  Resp:  [16] 16  BP: (103-124)/(68-74) 103/70  MAP:  [63 mmHg-253 mmHg] 74 mmHg  Arterial Line BP: ()/(49-76) 88/60  FiO2 (%):  [35 %] 35 %  SpO2:  [94 %-100 %] 98 %        Intake/Output Summary (Last 24 hours) at 1/2/2020 1141  Last data filed at 1/2/2020 1100  Gross per 24 hour   Intake 4592 ml   Output 2420 ml   Net 2172 ml       Wt Readings from Last 4 Encounters:   01/02/20 127 kg (279 lb 15.8 oz)   11/01/19 113.4 kg (250 lb)   10/28/19 121.7 kg (268 lb 3.2 oz)   08/29/19 117.5 kg (259 lb)     Arterial Line BP: ()/(49-76) 88/60  MAP:  [63 mmHg-253 mmHg] 74 mmHg  BP - Mean:  [77-98] 77  Ventilation Mode: CMV/AC  (Continuous Mandatory Ventilation/ Assist Control)  FiO2 (%): 35 %  Rate Set (breaths/minute): 16 breaths/min  Tidal Volume Set (mL): 500 mL  PEEP (cm H2O): 8 cmH2O  Oxygen Concentration (%): 35 %  Resp: 16    Recent Labs   Lab 12/30/19  1303 12/30/19  0045   PH 7.37 7.44   PCO2 36 41   PO2 97 120*   HCO3 21 28   O2PER  --  100       GEN: intubated sedate not following commands   HEENT: head ncat, sclera anicteric, OP patent, trachea midline   PULM: unlabored synchronous with vent, clear anteriorly diminished at bases and laterally   CV/COR: tachycardic irregular S1S2 no gallop,  No rub, no murmur  ABD:  Obese soft  Mild tenderness   Midline tube c/d/i hypoactive bowel sounds, no mass noted hernia  EXT: warml, no edema   NEURO: limited by sedation    SKIN: no obvious rash  LINES: clean, dry intact         Data:   All data and imaging reviewed     ROUTINE ICU LABS (Last four results)  CMP  Recent Labs   Lab 01/02/20  0416 01/01/20  2130 01/01/20  1305 01/01/20  0415 12/31/19  1315 12/31/19  0400  12/30/19  0420 12/29/19  2130   * 149* 152* 151* 149* 146*   < >  --  146*   POTASSIUM 3.9  --  3.8 3.6 3.8 3.7   < >  --  3.9   CHLORIDE 118*  --  122* 121* 119* 115*   < >  --  109   CO2 22  --  23 21 21 19*   < >  --  28   ANIONGAP 8  --   --  9 9 12   < >  --  9   *  --  152* 126* 290* 285*   < >  --  189*   BUN 59*  --  66* 75* 80* 86*   < >  --  60*   CR 1.47*  --  1.72* 1.93* 2.36* 2.50*   < >  --  1.40*   GFRESTIMATED 38*  --  31* 27* 21* 20*   < >  --  40*   GFRESTBLACK 44*  --  35* 31* 25* 23*   < >  --  46*   DORY 6.5*  --  6.7* 6.2* 6.3* 6.3*   < >  --  8.6   MAG 2.2  --   --   --   --  1.8  --  2.2 2.1   PHOS  --   --   --   --   --   --   --   --  2.4*   PROTTOTAL 4.8*  --  5.0* 5.1* 4.7* 4.8*   < >  --  5.8*   ALBUMIN 2.3*  --  2.6* 2.7* 2.5* 2.7*   < >  --  2.5*   BILITOTAL 0.9  --  1.1 1.3 1.9* 2.5*   < >  --  1.0   ALKPHOS 89  --  82 81 73 74   < >  --  97   *  --  433* 531* 768* 977*   < >  --  567*   *  --  965* 1,041* 1,221* 1,263*   < >  --  738*    < > = values in this interval not displayed.     CBC  Recent Labs   Lab 01/02/20  0416 01/01/20  1345 01/01/20  0415 12/31/19  1315   WBC 26.1* 23.2* 22.4* 16.1*   RBC 4.19 4.42 4.48 4.60   HGB 11.8 12.3 12.4 12.8   HCT 36.0 37.9 38.5 40.2   MCV 86 86 86 87   MCH 28.2 27.8 27.7 27.8   MCHC 32.8 32.5 32.2 31.8   RDW 17.1* 16.7* 16.4* 16.4*   * 140* 150 146*     INR  Recent Labs   Lab 12/29/19  1535 12/29/19  1436   INR 1.61* Canceled, Test credited     Arterial Blood Gas  Recent Labs   Lab 12/30/19  1303 12/30/19  0045   PH 7.37 7.44   PCO2 36 41   PO2 97  120*   HCO3 21 28   O2PER  --  100       All cultures:  Recent Labs   Lab 12/30/19  0425 12/29/19  1507 12/29/19  1435   CULT Methicillin resistant Staphylococcus aureus (MRSA) isolated* No growth after 4 days Cultured on the 1st day of incubation:  Staphylococcus hominis  *  Critical Value/Significant Value, preliminary result only, called to and read back by  Suhail Samano RN from [facility] ICU. 12/30/19 at 1202.TV.    Cultured on the 1st day of incubation:  Staphylococcus epidermidis  *  (Note)  POSITIVE for Staphylococci other than S.aureus, S.epidermidis and  S.lugdunensis, by Stupeflixigene multiplex nucleic acid test.  Coagulase-negative staphylococci are the most common venipuncture or  collection associated skin CONTAMINANTS grown in blood cultures.  Final identification and antimicrobial susceptibility testing will be  verified by standard methods.    Specimen tested with Verigene multiplex, gram-positive blood culture  nucleic acid test for the following targets: Staph aureus, Staph  epidermidis, Staph lugdunensis, other Staph species, Enterococcus  faecalis, Enterococcus faecium, Streptococcus species, S. agalactiae,  S. anginosus grp., S. pneumoniae, S. pyogenes, Listeria sp., mecA  (methicillin resistance) and Nick/B (vancomycin resistance).    Critical Value/Significant Value called to and read back by SUHAIL SAMANO RN 12/30/2019 @ 1437 BC       Recent Results (from the past 24 hour(s))   XR Chest Port 1 View    Narrative    XR PORTABLE CHEST ONE VIEW   1/1/2020 2:50 PM     HISTORY: Postoperative, check enteric tube (ETT) placement.    COMPARISON: 12/30/2019.      Impression    IMPRESSION: Stable ET tube above the . Stable right neck central  line and nasogastric tube. Right greater than left base pleural fluid  suggested. Bibasilar opacities persist and may be ongoing airspace  disease or atelectasis. Stable cardiomegaly.    FRITZ FLORES MD         Billing: This patient is critically ill:  Yes. Total critical care time today 35 min.

## 2020-01-02 NOTE — PROGRESS NOTES
Assessment and Plan:     1.  Septic and cardiogenic shock requiring somewhat less pressor support than yesterday.  2.  Perforated duodenal ulcer due to ischemic enteritis with peritonitis, status post surgical drainage tube placement and lavage. Re-operation yesterday. Ulcer closure is not possible. No definitive treatment options available at this time. Sepsis syndrome seems to be improving.   3.  Acute myocardial infarction, with initial troponin of 19, falling to 12 and stabilizing.  4.  Severe cardiomyopathy, etiology unclear.  This may be due to sepsis, stress cardiomyopathy, or ischemic cardiomyopathy.  Left ventricular ejection fraction estimated at 25% (overestimated on echo report).    5.  Acute kidney injury, improved.   6.  Acute hepatic injury, improved.  7.  Respiratory failure requiring mechanical ventilation  8.  Chronic pain syndrome treated with methadone, currently on fentanyl IV infusion.    Recommendations:    Continue supportive measures for blood pressure with pressors.   Oxygenation appears adequate. Cardiac function does not appear to be a limiting factor.  No further cardiac intervention is recommended at this time.   As she recovers, we will consider additional evaluation and treatment for cardiomyopathy and possible CAD.     Cardiology will sign off for now. Please re-consult if she makes progress and would be appropriate for further cardiac evaluation and treatment for cardiomyopathy.     TORI REYES MD           Interval History:   Intubated and sedated          Medications:       ascorbic acid intermittent infusion  1,500 mg Intravenous Q6H     chlorhexidine  15 mL Mouth/Throat Q12H     fluconazole  200 mg Intravenous Q24H     hydrocortisone sodium succinate PF  100 mg Intravenous Q8H     levothyroxine  50 mcg Intravenous Daily     meropenem  1 g Intravenous Q8H     pantoprazole (PROTONIX) IV  40 mg Intravenous Daily     sodium chloride (PF)  3 mL Intracatheter Q8H      thiamine  200 mg Intravenous Daily     vancomycin place ortega - receiving intermittent dosing  1 each Intravenous See Admin Instructions            Physical Exam:   Blood pressure 105/77, pulse 84, temperature 99.1  F (37.3  C), resp. rate 16, weight 127 kg (279 lb 15.8 oz), SpO2 97 %, not currently breastfeeding.    Vitals:    12/29/19 1428 12/30/19 0345 12/31/19 0235 01/01/20 0400   Weight: 140 kg (308 lb 10.3 oz) 138.8 kg (306 lb) 122 kg (268 lb 15.4 oz) 124 kg (273 lb 5.9 oz)    01/02/20 0400   Weight: 127 kg (279 lb 15.8 oz)         Intake/Output Summary (Last 24 hours) at 12/30/2019 1034  Last data filed at 12/30/2019 0600  Gross per 24 hour   Intake 3246.55 ml   Output 1975 ml   Net 1271.55 ml       12/28 0700 - 01/02 0659  In: 49254.29 [I.V.:65676.29]  Out: 9030 [Urine:5795; Drains:2120]  Net: 8530.29    Exam:  GENERAL APPEARANCE ADULT: Intubated and sedated, grimaces to abdominal palpation, diaphoretic  RESP: diminished breath sounds  CV: regular rate and rhythm, no murmur, rub, or gallop  ABDOMEN: Grimaces with palpation, no bowel sounds heard  EXTREMITIES: No edema         Data:   LABS (Last four results)  CMP  Recent Labs   Lab 01/02/20  0416 01/01/20  2130 01/01/20  1305 01/01/20  0415 12/31/19  1315 12/31/19  0400  12/30/19  0420 12/29/19  2130   * 149* 152* 151* 149* 146*   < >  --  146*   POTASSIUM 3.9  --  3.8 3.6 3.8 3.7   < >  --  3.9   CHLORIDE 118*  --  122* 121* 119* 115*   < >  --  109   CO2 22  --  23 21 21 19*   < >  --  28   ANIONGAP 8  --   --  9 9 12   < >  --  9   *  --  152* 126* 290* 285*   < >  --  189*   BUN 59*  --  66* 75* 80* 86*   < >  --  60*   CR 1.47*  --  1.72* 1.93* 2.36* 2.50*   < >  --  1.40*   GFRESTIMATED 38*  --  31* 27* 21* 20*   < >  --  40*   GFRESTBLACK 44*  --  35* 31* 25* 23*   < >  --  46*   DORY 6.5*  --  6.7* 6.2* 6.3* 6.3*   < >  --  8.6   MAG 2.2  --   --   --   --  1.8  --  2.2 2.1   PHOS  --   --   --   --   --   --   --   --  2.4*   PROTTOTAL  4.8*  --  5.0* 5.1* 4.7* 4.8*   < >  --  5.8*   ALBUMIN 2.3*  --  2.6* 2.7* 2.5* 2.7*   < >  --  2.5*   BILITOTAL 0.9  --  1.1 1.3 1.9* 2.5*   < >  --  1.0   ALKPHOS 89  --  82 81 73 74   < >  --  97   *  --  433* 531* 768* 977*   < >  --  567*   *  --  965* 1,041* 1,221* 1,263*   < >  --  738*    < > = values in this interval not displayed.     CBC  Recent Labs   Lab 01/02/20  0416 01/01/20  1345 01/01/20  0415 12/31/19  1315   WBC 26.1* 23.2* 22.4* 16.1*   RBC 4.19 4.42 4.48 4.60   HGB 11.8 12.3 12.4 12.8   HCT 36.0 37.9 38.5 40.2   MCV 86 86 86 87   MCH 28.2 27.8 27.7 27.8   MCHC 32.8 32.5 32.2 31.8   RDW 17.1* 16.7* 16.4* 16.4*   * 140* 150 146*     INR  Recent Labs   Lab 12/29/19  1535 12/29/19  1436   INR 1.61* Canceled, Test credited     TROPONINS   Lab Results   Component Value Date    TROPI 12.097 () 12/30/2019    TROPI 12.385 () 12/29/2019    TROPI 19.402 () 12/29/2019    TROPI <0.015 12/10/2017    TROPI  07/26/2017     <0.015  The 99th percentile for upper reference range is 0.045 ug/L.  Troponin values in   the range of 0.045 - 0.120 ug/L may be associated with risks of adverse   clinical events.                                                                                                                 TORI REYES MD

## 2020-01-02 NOTE — PROGRESS NOTES
Surgery    Events reviewed from last night.  No significant changes.  Having complete bilious output from Malecot tube and some dark fluid from left THAI drain.    Abdomen-soft.  Left THAI with dark fluid.  Right THAI serous.  Malecot bilious.    A/P  Having bilious output from 2 drains.  We will have to monitor closely but seems controlled at this time.  Okay to start trickle feeds through J-tube.  Had an extensive conversation with the  regarding operative findings and long-term outcome yesterday.  Continue ICU care.    Ayan Lopez M.D.  [facility]  [phone]

## 2020-01-02 NOTE — PLAN OF CARE
Patient remains intubated and sedated with propofol. Fentanyl drip for pain control. Pupils equal and reactive. Patient grimaces with oral cares or ETT suctioning. No movement of arms. Legs spasm to painful stimuli.   Patient has multiple surgical drains. See I+O flowsheet. Surgical dressing clean dry intact.   Patient remains on levophed and vasopressin to maintain adequate MAP.   A-fib on tele-monitor. Rate controlled.   Insulin drip infusing. Algorithm 4+2units/hr.   Suprapubic catheter draining adequate sean urine.   Bladimir Mcdonough RN 7:30 AM 01/02/20

## 2020-01-03 LAB
ALBUMIN SERPL-MCNC: 2.1 G/DL (ref 3.4–5)
ALP SERPL-CCNC: 73 U/L (ref 40–150)
ALT SERPL W P-5'-P-CCNC: 669 U/L (ref 0–50)
ANION GAP SERPL CALCULATED.3IONS-SCNC: 6 MMOL/L (ref 3–14)
AST SERPL W P-5'-P-CCNC: 281 U/L (ref 0–45)
BASE DEFICIT BLDA-SCNC: 1.2 MMOL/L
BASOPHILS # BLD AUTO: 0 10E9/L (ref 0–0.2)
BASOPHILS NFR BLD AUTO: 0.1 %
BILIRUB SERPL-MCNC: 1.1 MG/DL (ref 0.2–1.3)
BUN SERPL-MCNC: 50 MG/DL (ref 7–30)
CALCIUM SERPL-MCNC: 6.8 MG/DL (ref 8.5–10.1)
CHLORIDE SERPL-SCNC: 115 MMOL/L (ref 94–109)
CO2 SERPL-SCNC: 24 MMOL/L (ref 20–32)
CREAT SERPL-MCNC: 1.18 MG/DL (ref 0.52–1.04)
DIFFERENTIAL METHOD BLD: ABNORMAL
EOSINOPHIL # BLD AUTO: 0 10E9/L (ref 0–0.7)
EOSINOPHIL NFR BLD AUTO: 0 %
ERYTHROCYTE [DISTWIDTH] IN BLOOD BY AUTOMATED COUNT: 17.1 % (ref 10–15)
GFR SERPL CREATININE-BSD FRML MDRD: 49 ML/MIN/{1.73_M2}
GLUCOSE BLDC GLUCOMTR-MCNC: 100 MG/DL (ref 70–99)
GLUCOSE BLDC GLUCOMTR-MCNC: 113 MG/DL (ref 70–99)
GLUCOSE BLDC GLUCOMTR-MCNC: 113 MG/DL (ref 70–99)
GLUCOSE BLDC GLUCOMTR-MCNC: 114 MG/DL (ref 70–99)
GLUCOSE BLDC GLUCOMTR-MCNC: 130 MG/DL (ref 70–99)
GLUCOSE BLDC GLUCOMTR-MCNC: 141 MG/DL (ref 70–99)
GLUCOSE BLDC GLUCOMTR-MCNC: 197 MG/DL (ref 70–99)
GLUCOSE BLDC GLUCOMTR-MCNC: 225 MG/DL (ref 70–99)
GLUCOSE BLDC GLUCOMTR-MCNC: 265 MG/DL (ref 70–99)
GLUCOSE BLDC GLUCOMTR-MCNC: 81 MG/DL (ref 70–99)
GLUCOSE BLDC GLUCOMTR-MCNC: 81 MG/DL (ref 70–99)
GLUCOSE SERPL-MCNC: 124 MG/DL (ref 70–99)
HCO3 BLD-SCNC: 23 MMOL/L (ref 21–28)
HCT VFR BLD AUTO: 33.3 % (ref 35–47)
HGB BLD-MCNC: 10.7 G/DL (ref 11.7–15.7)
IMM GRANULOCYTES # BLD: 0.5 10E9/L (ref 0–0.4)
IMM GRANULOCYTES NFR BLD: 1.9 %
INTERPRETATION ECG - MUSE: NORMAL
LYMPHOCYTES # BLD AUTO: 0.7 10E9/L (ref 0.8–5.3)
LYMPHOCYTES NFR BLD AUTO: 2.6 %
MCH RBC QN AUTO: 27.8 PG (ref 26.5–33)
MCHC RBC AUTO-ENTMCNC: 32.1 G/DL (ref 31.5–36.5)
MCV RBC AUTO: 87 FL (ref 78–100)
MONOCYTES # BLD AUTO: 2.4 10E9/L (ref 0–1.3)
MONOCYTES NFR BLD AUTO: 8.7 %
NEUTROPHILS # BLD AUTO: 24 10E9/L (ref 1.6–8.3)
NEUTROPHILS NFR BLD AUTO: 86.7 %
NRBC # BLD AUTO: 0.2 10*3/UL
NRBC BLD AUTO-RTO: 1 /100
PCO2 BLD: 38 MM HG (ref 35–45)
PH BLD: 7.4 PH (ref 7.35–7.45)
PLATELET # BLD AUTO: 119 10E9/L (ref 150–450)
PO2 BLD: 103 MM HG (ref 80–105)
POTASSIUM SERPL-SCNC: 4.3 MMOL/L (ref 3.4–5.3)
PROT SERPL-MCNC: 4.6 G/DL (ref 6.8–8.8)
RBC # BLD AUTO: 3.85 10E12/L (ref 3.8–5.2)
SODIUM SERPL-SCNC: 145 MMOL/L (ref 133–144)
WBC # BLD AUTO: 27.7 10E9/L (ref 4–11)

## 2020-01-03 PROCEDURE — 00000146 ZZHCL STATISTIC GLUCOSE BY METER IP

## 2020-01-03 PROCEDURE — 40000275 ZZH STATISTIC RCP TIME EA 10 MIN

## 2020-01-03 PROCEDURE — 27210437 ZZH NUTRITION PRODUCT SEMIELEM INTERMED LITER

## 2020-01-03 PROCEDURE — 25800030 ZZH RX IP 258 OP 636: Performed by: INTERNAL MEDICINE

## 2020-01-03 PROCEDURE — 25800030 ZZH RX IP 258 OP 636: Performed by: PHYSICIAN ASSISTANT

## 2020-01-03 PROCEDURE — 25000128 H RX IP 250 OP 636: Performed by: PHYSICIAN ASSISTANT

## 2020-01-03 PROCEDURE — 99291 CRITICAL CARE FIRST HOUR: CPT | Mod: 24 | Performed by: INTERNAL MEDICINE

## 2020-01-03 PROCEDURE — 40000008 ZZH STATISTIC AIRWAY CARE

## 2020-01-03 PROCEDURE — P9041 ALBUMIN (HUMAN),5%, 50ML: HCPCS

## 2020-01-03 PROCEDURE — C9113 INJ PANTOPRAZOLE SODIUM, VIA: HCPCS | Performed by: PHYSICIAN ASSISTANT

## 2020-01-03 PROCEDURE — 25000128 H RX IP 250 OP 636

## 2020-01-03 PROCEDURE — 82803 BLOOD GASES ANY COMBINATION: CPT | Performed by: INTERNAL MEDICINE

## 2020-01-03 PROCEDURE — 25000132 ZZH RX MED GY IP 250 OP 250 PS 637: Mod: GY | Performed by: INTERNAL MEDICINE

## 2020-01-03 PROCEDURE — 20000003 ZZH R&B ICU

## 2020-01-03 PROCEDURE — 25000128 H RX IP 250 OP 636: Performed by: INTERNAL MEDICINE

## 2020-01-03 PROCEDURE — 25000131 ZZH RX MED GY IP 250 OP 636 PS 637: Mod: GY | Performed by: PHYSICIAN ASSISTANT

## 2020-01-03 PROCEDURE — 80053 COMPREHEN METABOLIC PANEL: CPT | Performed by: INTERNAL MEDICINE

## 2020-01-03 PROCEDURE — 85025 COMPLETE CBC W/AUTO DIFF WBC: CPT | Performed by: INTERNAL MEDICINE

## 2020-01-03 PROCEDURE — 94003 VENT MGMT INPAT SUBQ DAY: CPT

## 2020-01-03 PROCEDURE — 25000125 ZZHC RX 250: Performed by: PHYSICIAN ASSISTANT

## 2020-01-03 RX ORDER — NICOTINE POLACRILEX 4 MG
15-30 LOZENGE BUCCAL
Status: DISCONTINUED | OUTPATIENT
Start: 2020-01-03 | End: 2020-01-03

## 2020-01-03 RX ORDER — DEXTROSE MONOHYDRATE 100 MG/ML
INJECTION, SOLUTION INTRAVENOUS CONTINUOUS PRN
Status: DISCONTINUED | OUTPATIENT
Start: 2020-01-03 | End: 2020-01-03

## 2020-01-03 RX ORDER — ALBUMIN, HUMAN INJ 5% 5 %
25 SOLUTION INTRAVENOUS ONCE
Status: COMPLETED | OUTPATIENT
Start: 2020-01-03 | End: 2020-01-03

## 2020-01-03 RX ORDER — DEXTROSE MONOHYDRATE 25 G/50ML
25-50 INJECTION, SOLUTION INTRAVENOUS
Status: DISCONTINUED | OUTPATIENT
Start: 2020-01-03 | End: 2020-01-03

## 2020-01-03 RX ORDER — ALBUMIN, HUMAN INJ 5% 5 %
SOLUTION INTRAVENOUS
Status: COMPLETED
Start: 2020-01-03 | End: 2020-01-03

## 2020-01-03 RX ADMIN — ASCORBIC ACID 1500 MG: 500 INJECTION, SOLUTION INTRAMUSCULAR; INTRAVENOUS; SUBCUTANEOUS at 02:22

## 2020-01-03 RX ADMIN — MEROPENEM 1 G: 1 INJECTION, POWDER, FOR SOLUTION INTRAVENOUS at 00:22

## 2020-01-03 RX ADMIN — LEVOTHYROXINE SODIUM ANHYDROUS 50 MCG: 100 INJECTION, POWDER, LYOPHILIZED, FOR SOLUTION INTRAVENOUS at 13:53

## 2020-01-03 RX ADMIN — PROPOFOL 15 MCG/KG/MIN: 10 INJECTION, EMULSION INTRAVENOUS at 13:49

## 2020-01-03 RX ADMIN — SODIUM CHLORIDE 4 UNITS/HR: 9 INJECTION, SOLUTION INTRAVENOUS at 03:58

## 2020-01-03 RX ADMIN — DEXTROSE MONOHYDRATE: 50 INJECTION, SOLUTION INTRAVENOUS at 13:49

## 2020-01-03 RX ADMIN — PROPOFOL 10 MCG/KG/MIN: 10 INJECTION, EMULSION INTRAVENOUS at 03:58

## 2020-01-03 RX ADMIN — THIAMINE HYDROCHLORIDE 200 MG: 100 INJECTION, SOLUTION INTRAMUSCULAR; INTRAVENOUS at 08:47

## 2020-01-03 RX ADMIN — Medication 0.09 MCG/KG/MIN: at 07:01

## 2020-01-03 RX ADMIN — ASCORBIC ACID 1500 MG: 500 INJECTION, SOLUTION INTRAMUSCULAR; INTRAVENOUS; SUBCUTANEOUS at 13:55

## 2020-01-03 RX ADMIN — ASCORBIC ACID 1500 MG: 500 INJECTION, SOLUTION INTRAMUSCULAR; INTRAVENOUS; SUBCUTANEOUS at 20:37

## 2020-01-03 RX ADMIN — CHLORHEXIDINE GLUCONATE 15 ML: 1.2 RINSE ORAL at 19:42

## 2020-01-03 RX ADMIN — VASOPRESSIN 2.4 UNITS/HR: 20 INJECTION INTRAVENOUS at 11:56

## 2020-01-03 RX ADMIN — HYDROCORTISONE SODIUM SUCCINATE 50 MG: 100 INJECTION, POWDER, FOR SOLUTION INTRAMUSCULAR; INTRAVENOUS at 08:46

## 2020-01-03 RX ADMIN — DEXTROSE MONOHYDRATE: 50 INJECTION, SOLUTION INTRAVENOUS at 19:56

## 2020-01-03 RX ADMIN — Medication 125 MCG/HR: at 17:37

## 2020-01-03 RX ADMIN — PROPOFOL 15 MCG/KG/MIN: 10 INJECTION, EMULSION INTRAVENOUS at 21:51

## 2020-01-03 RX ADMIN — MEROPENEM 1 G: 1 INJECTION, POWDER, FOR SOLUTION INTRAVENOUS at 15:58

## 2020-01-03 RX ADMIN — QUETIAPINE 25 MG: 25 TABLET ORAL at 20:39

## 2020-01-03 RX ADMIN — MEROPENEM 1 G: 1 INJECTION, POWDER, FOR SOLUTION INTRAVENOUS at 08:47

## 2020-01-03 RX ADMIN — ALBUMIN HUMAN 25 G: 50 SOLUTION INTRAVENOUS at 07:53

## 2020-01-03 RX ADMIN — HYDROCORTISONE SODIUM SUCCINATE 50 MG: 100 INJECTION, POWDER, FOR SOLUTION INTRAMUSCULAR; INTRAVENOUS at 00:22

## 2020-01-03 RX ADMIN — CHLORHEXIDINE GLUCONATE 15 ML: 1.2 RINSE ORAL at 08:02

## 2020-01-03 RX ADMIN — ASCORBIC ACID 1500 MG: 500 INJECTION, SOLUTION INTRAMUSCULAR; INTRAVENOUS; SUBCUTANEOUS at 08:47

## 2020-01-03 RX ADMIN — SODIUM CHLORIDE 4 UNITS/HR: 9 INJECTION, SOLUTION INTRAVENOUS at 10:21

## 2020-01-03 RX ADMIN — QUETIAPINE 25 MG: 25 TABLET ORAL at 08:47

## 2020-01-03 RX ADMIN — ALBUMIN HUMAN 25 G: 0.05 INJECTION, SOLUTION INTRAVENOUS at 07:53

## 2020-01-03 RX ADMIN — HYDROCORTISONE SODIUM SUCCINATE 50 MG: 100 INJECTION, POWDER, FOR SOLUTION INTRAMUSCULAR; INTRAVENOUS at 15:57

## 2020-01-03 RX ADMIN — PANTOPRAZOLE SODIUM 40 MG: 40 INJECTION, POWDER, FOR SOLUTION INTRAVENOUS at 08:47

## 2020-01-03 ASSESSMENT — ACTIVITIES OF DAILY LIVING (ADL)
ADLS_ACUITY_SCORE: 29
ADLS_ACUITY_SCORE: 31
ADLS_ACUITY_SCORE: 27
ADLS_ACUITY_SCORE: 25
ADLS_ACUITY_SCORE: 29
ADLS_ACUITY_SCORE: 29

## 2020-01-03 NOTE — PROGRESS NOTES
Critical Care Progress Note      01/03/2020    Name: Bhavani White MRN#: [id number]   Age: 64 year old YOB: 1955     Hsptl Day# 5  ICU DAY #5    MV DAY #5             Problem List:   Principal Problem:    Duodenal ulcer perforation (H)  Active Problems:    Elevated BMI    Perforated duodenal ulcer (H)    Acute respiratory failure with hypoxia (H)    NSTEMI (non-ST elevated myocardial infarction) (H)    Septic shock (H)           Summary/Hospital Course:     64 year old female with history of chronic pain on methadone, obesity, ANIYA, hypothyroidism, DM and prior abdominal surgery presented to ED this evening with altered mental status with abdominal pain and emesis discovered to have perforated gastric ulcer on imaging.      Per EMS notes patient not feeling well with 3 day prodrome not taking meds (methadone) with new altered mental status this evening prompting her  to call to 911.  Per EMS en route, patient vomited 'fecal material' with suspected aspiration. On arrival in ED, patient noted to be altered tachypneic, thrashing with cool, mottled skin and sats in 40% (?)  prompting immediate intubation for respiratory failure. Hemodynamics initially appropriate with MAP in 70s. EKG with some dynamic changes on initial tracing with new incomplete bundle branch block Labs notable for ED work notable for tropon ~20 , lactic acid 3 , ROLANDO 1.6 and acute hepatitis with LFTs in 700's.  WBC 20K, elevated Hgb 19. CT CAP demonstrated small effusions patchy GGO in RML consistent with aspiration, fat stranding around the pylorus and anterior duodenum suspicious for perforation as small amount of extraluminal gas.      TLC subsequently placed, zosyn started and 3L NS bolused. Surgery contacted in ED with recommendations for conservative management given unclear status of AMI, case also discussed with Cardiology       12/29 - admit > decompensated into shock  4 pressors requirement  12/30  - OR for  exploration no overt leak, noted gastric and duodenal ischemia and hemorrhage, blood evacuated and drain placed   12/31 - stabilized hemodynamics but remains on two pressors at similar doses , D5W for Hyper Na  1/1 - back to OR unable to repair per 2/2 size, drain placed for containment, bowel ischemia improved on visualization, no change in pressor requirements             Interim History:     1/2 - no major events, variable pressor requirements, UOP stable, trickle feeds started , blood culture from art line GPC       Assessment and plan :     Bhavani White IS a 64 year old female admitted on 12/29/2019 for abdominal sepsis 2/2 perforated duodenal ulcer, elevated troponins/NSTEMI, acute hypoxemic respiratory failure requiring intubation.      I have personally reviewed the daily labs, imaging studies, cultures and discussed the case with referring physician and consulting physicians.      My assessment and plan by system for this patient is as follows:     Neurology/Psychiatry:   1. Encephalopathy - metabolic, septic - stable     2. Pain/analgesia, chronic, on chronic methadone,   3. Anxiety 2/2 acute illness  Plan  - continue fentanyl gtt, titrate to effect ,   - propofol gtt for sedation, wean as able   - goal RASS -2 , daily sedation holiday as indicated  - continue seroquel in attempt to wean off propofol   - add prn versed     Cardiovascular:   1. Shock, mixed septic, cardiogenic - still pressor dependent, responsive to fluid initially, and improved with calcium supplementation -   2. NSTEMI /septic cardiomyopathy trop ~20 peak;  3.Rhythm - iBBB on serial EKGs  - ? afib vs intermittent PVC similar pattern seen in 2018, no discernible acute changes   Plan  - continue hemodynamic support, MAP >65 , wean pressors as able   - PRN fluid challenge as indicated  - defer further cardiac eval given current condition and unable to anticoagulate (intra-abd bleed prior attempt)        Pulmonary/Ventilator Management:    1. Acute hypoxemic respiratory failure - 2/2 mixed shock - stable vent and O2 requirements  ? Suspect element of chronic hypoxemia given polycythemia - stable ventilatory requirements and oxygenation  2. Aspiration Pneumonia - noted RML GGO  Plan  - continue lung protective ventilation, keep increase PEEP 2/2 habitus and atelectaiss (10 cmH20)  - antibiotics as below   - serial ABG and CXR as indicated     GI and Nutrition :   1. Perforated ulcer contained s/p ex lap with drainage   2. Bowel ischemia - in setting of shock and hemorrhage - improved on 2nd look and hemodynamic support  3. Shock liver  - multifactorial - sepsis, meds, fatty liver - stable on serial eval, imaging, improving   Plan  - surgery following - post op care and drain management   - continue supportive cares  - serial labs and eval as indicated  - check TAGS given propofol use , wean as able      Renal/Fluids/Electrolytes:   1. ROLANDO- from sepsis Cr 2.5 peak - downtrending now (baseline < 0.8).  Electrolytes within normal,  acid base stable   2. Hypernatremia - related to volume, D5 started, improved   2. Chronic cystitis - with existing suprapubic catheter  Plan  - monitor function and electrolytes;  as needed with replacement per ICU protocols.   - generally avoid nephrotoxic agents such as NSAID, IV contrast unless specifically required  - adjust medications as needed for renal clearance  - follow I/O's as appropriate.  - maintain hemodynamics, prn volume challenge as appropriate (match outputs as able)   - D5W plus water flushes , goal       Infectious Disease:   1. Abdominal sepsis secondary to perforated duodenal ulcer; zosyn started > broadened to meropenem, fungal prophylaxis given containment issue x5  2. Staph hominis bacteremia - 2/2 above, blood cultures 1/2 positive   3. Aspiration pneumonitis, high potential for aspiration PNA  4. Hx of polymicrobial UTI/cystitis vs colonization with suprapubic cath - Hx of MRSA   Plan  -  cont meropenem, discontinue fungal prophylaxis (5 days)   - fup ID of repeat blood cultures ( if CONS) would continue current plan if staph H again, need line exchange   - continue HAT given continued pressor requirement        Endocrine:   1. H/o DMII  2. Stress induced hyperglycemia  3. Hx of iatrogenic cushings disease - previously on steroids  4. Hypothyroidism  Plan  - ICU insulin protocol, goal sugar <180, discontinue insulin gtt  - IV levothyroxine  - stress dose steroids, weaned to 50 q8  - continue D5W until Na 140         Hematology/Oncology:   1. Acute blood loss anemia - in setting of perf ulcer/AC, s/p drainage, Hgb 19 on admission,    2. Polycythemia- may be suggestive of dehydration, but patient with hematocrit of > 17 on previous admission.    3. Thrombocytopenia - sepsis related, stable   Plan  - monitor daily CBC       IV/Access:   1. Venous access - PIV. TLC   2. Arterial access - radial arterial line as needed  Plan  - central access to be established if patient is requiring vasopressors        ICU Prophylaxis:   1. DVT: mechanical   2. VAP: HOB 30 degrees, chlorhexidine rinse  3. Stress Ulcer: PPI  4. Restraints: Nonviolent soft two point restraints required and necessary for patient safety and continued cares and good effect as patient continues to pull at necessary lines, tubes despite education and distraction. Will readdress daily.   5. Wound care -per surgery and unit routine   6. Feeding - NPO for medical reasons. NGT to suction.    7. Family Update: at bedside, brother in law also present and updated  8. Disposition - ICU.  Prognosis guarded.      Key goals for next 24 hours:   1. Volume challenge > wean pressors as able  2. Continue mechanical ventilation  3. Continue supportive cares  4. Free water  5. FUP cultures, continue current abx          Key Medications:       albumin human  25 g Intravenous Once     ascorbic acid intermittent infusion  1,500 mg Intravenous Q6H     chlorhexidine   15 mL Mouth/Throat Q12H     fluconazole  200 mg Intravenous Q24H     hydrocortisone sodium succinate PF  50 mg Intravenous Q8H     levothyroxine  50 mcg Intravenous Daily     meropenem  1 g Intravenous Q8H     pantoprazole (PROTONIX) IV  40 mg Intravenous Daily     QUEtiapine  25 mg Oral BID     sodium chloride (PF)  3 mL Intracatheter Q8H     thiamine  200 mg Intravenous Daily     vancomycin place ortega - receiving intermittent dosing  1 each Intravenous See Admin Instructions       dextrose       dextrose       D5W 110 mL/hr at 01/02/20 2307     fentaNYL 125 mcg/hr (01/02/20 2216)     insulin (regular) 6 Units/hr (01/03/20 0700)     - MEDICATION INSTRUCTIONS -       norepinephrine 0.09 mcg/kg/min (01/03/20 0701)     - MEDICATION INSTRUCTIONS -       propofol (DIPRIVAN) infusion 10 mcg/kg/min (01/03/20 0358)     ACE/ARB/ARNI NOT PRESCRIBED       BETA BLOCKER NOT PRESCRIBED       STATIN NOT PRESCRIBED       vasopressin (PITRESSIN) infusion ADULT (40 mL) 2.4 Units/hr (01/02/20 2200)               Physical Examination:   Temp:  [93  F (33.9  C)-100.2  F (37.9  C)] 99  F (37.2  C)  Pulse:  [66-84] 84  Heart Rate:  [] 65  Resp:  [16] 16  BP: (103-123)/(68-77) 105/77  MAP:  [35 mmHg-119 mmHg] 72 mmHg  Arterial Line BP: ()/() 96/52  FiO2 (%):  [30 %-35 %] 30 %  SpO2:  [89 %-100 %] 98 %      Intake/Output Summary (Last 24 hours) at 1/3/2020 1317  Last data filed at 1/3/2020 1300  Gross per 24 hour   Intake 3631.76 ml   Output 2040 ml   Net 1591.76 ml         Wt Readings from Last 4 Encounters:   01/03/20 130 kg (286 lb 9.6 oz)   11/01/19 113.4 kg (250 lb)   10/28/19 121.7 kg (268 lb 3.2 oz)   08/29/19 117.5 kg (259 lb)     Arterial Line BP: ()/() 96/52  MAP:  [35 mmHg-119 mmHg] 72 mmHg  BP - Mean:  [77-87] 85  Ventilation Mode: CMV/AC  (Continuous Mandatory Ventilation/ Assist Control)  FiO2 (%): 30 %  Rate Set (breaths/minute): 16 breaths/min  Tidal Volume Set (mL): 500 mL  PEEP (cm H2O):  10 cmH2O  Oxygen Concentration (%): 30 %  Resp: 16    Recent Labs   Lab 01/03/20  0525 12/30/19  1303 12/30/19  0045   PH 7.40 7.37 7.44   PCO2 38 36 41   PO2 103 97 120*   HCO3 23 21 28   O2PER  --   --  100       GEN: intubated sedate not following commands   HEENT: head ncat, sclera anicteric, OP patent, trachea midline   PULM: unlabored synchronous with vent, clear anteriorly diminished at bases and laterally   CV/COR: tachycardic irregular S1S2 no gallop,  No rub, no murmur  ABD:  Obese soft  Mild tenderness  Midline tube c/d/i hypoactive bowel sounds, no mass noted hernia  EXT: warm, no edema   NEURO: limited by sedation    SKIN: no obvious rash  LINES: clean, dry intact         Data:   All data and imaging reviewed     ROUTINE ICU LABS (Last four results)  CMP  Recent Labs   Lab 01/03/20  0402 01/02/20  0416 01/01/20  2130 01/01/20  1305 01/01/20  0415 12/31/19  1315 12/31/19  0400  12/30/19  0420 12/29/19  2130   * 148* 149* 152* 151* 149* 146*   < >  --  146*   POTASSIUM 4.3 3.9  --  3.8 3.6 3.8 3.7   < >  --  3.9   CHLORIDE 115* 118*  --  122* 121* 119* 115*   < >  --  109   CO2 24 22  --  23 21 21 19*   < >  --  28   ANIONGAP 6 8  --   --  9 9 12   < >  --  9   * 133*  --  152* 126* 290* 285*   < >  --  189*   BUN 50* 59*  --  66* 75* 80* 86*   < >  --  60*   CR 1.18* 1.47*  --  1.72* 1.93* 2.36* 2.50*   < >  --  1.40*   GFRESTIMATED 49* 38*  --  31* 27* 21* 20*   < >  --  40*   GFRESTBLACK 57* 44*  --  35* 31* 25* 23*   < >  --  46*   DORY 6.8* 6.5*  --  6.7* 6.2* 6.3* 6.3*   < >  --  8.6   MAG  --  2.2  --   --   --   --  1.8  --  2.2 2.1   PHOS  --   --   --   --   --   --   --   --   --  2.4*   PROTTOTAL 4.6* 4.8*  --  5.0* 5.1* 4.7* 4.8*   < >  --  5.8*   ALBUMIN 2.1* 2.3*  --  2.6* 2.7* 2.5* 2.7*   < >  --  2.5*   BILITOTAL 1.1 0.9  --  1.1 1.3 1.9* 2.5*   < >  --  1.0   ALKPHOS 73 89  --  82 81 73 74   < >  --  97   * 391*  --  433* 531* 768* 977*   < >  --  567*   * 842*   --  965* 1,041* 1,221* 1,263*   < >  --  738*    < > = values in this interval not displayed.     CBC  Recent Labs   Lab 01/03/20  0402 01/02/20  0416 01/01/20  1345 01/01/20  0415   WBC 27.7* 26.1* 23.2* 22.4*   RBC 3.85 4.19 4.42 4.48   HGB 10.7* 11.8 12.3 12.4   HCT 33.3* 36.0 37.9 38.5   MCV 87 86 86 86   MCH 27.8 28.2 27.8 27.7   MCHC 32.1 32.8 32.5 32.2   RDW 17.1* 17.1* 16.7* 16.4*   * 146* 140* 150     INR  Recent Labs   Lab 12/29/19  1535 12/29/19  1436   INR 1.61* Canceled, Test credited     Arterial Blood Gas  Recent Labs   Lab 01/03/20  0525 12/30/19  1303 12/30/19  0045   PH 7.40 7.37 7.44   PCO2 38 36 41   PO2 103 97 120*   HCO3 23 21 28   O2PER  --   --  100       All cultures:  Recent Labs   Lab 01/02/20  1233 12/30/19  0425 12/29/19  1507 12/29/19  1435   CULT No growth after 13 hours Methicillin resistant Staphylococcus aureus (MRSA) isolated* No growth after 5 days Cultured on the 1st day of incubation:  Staphylococcus hominis  *  Critical Value/Significant Value, preliminary result only, called to and read back by  Suhail Escobar RN from [facility] ICU. 12/30/19 at 1202.TV.    Cultured on the 1st day of incubation:  Staphylococcus epidermidis  *  (Note)  POSITIVE for Staphylococci other than S.aureus, S.epidermidis and  S.lugdunensis, by Morningstarigene multiplex nucleic acid test.  Coagulase-negative staphylococci are the most common venipuncture or  collection associated skin CONTAMINANTS grown in blood cultures.  Final identification and antimicrobial susceptibility testing will be  verified by standard methods.    Specimen tested with Verigene multiplex, gram-positive blood culture  nucleic acid test for the following targets: Staph aureus, Staph  epidermidis, Staph lugdunensis, other Staph species, Enterococcus  faecalis, Enterococcus faecium, Streptococcus species, S. agalactiae,  S. anginosus grp., S. pneumoniae, S. pyogenes, Listeria sp., mecA  (methicillin resistance) and Nick/B  (vancomycin resistance).    Critical Value/Significant Value called to and read back by FANNY SAMANO RN 12/30/2019 @ 6265 BC       No results found for this or any previous visit (from the past 24 hour(s)).      Billing: This patient is critically ill: Yes. Total critical care time today 35 min.

## 2020-01-03 NOTE — PROGRESS NOTES
General surgery progress note    Intubated, sedated, receiving vasopressors for support  Open eyes to verbal stimuli  Her abdomen is soft, malecot with bilious drainage as expected.  Feeding jejunostomy functioning, G-tube draining.  JPs with thin fluid drainage.  Left has slight yellow tinge, right is more serous sanguinous.    Continue with enteral nutrition and drainage tubes as current.

## 2020-01-03 NOTE — PROGRESS NOTES
[facility] ICU RESPIRATORY NOTE    Date of Admission: 12/29/2019  Date of Intubation (most recent): 12/29/2019  Reason for Mechanical Ventilation: Respiratory failure  Number of Days on Mechanical Ventilation: 6  Met Criteria for Spontaneous Breathing Trial: No  Reason for No Spontaneous Breathing Trial: per MD    Significant Events Today: None overnight    ABG Results:   Recent Labs   Lab 12/30/19  1303 12/30/19  0045   PH 7.37 7.44   PCO2 36 41   PO2 97 120*   HCO3 21 28   O2PER  --  100     Current Vent Settings: Ventilation Mode: CMV/AC  (Continuous Mandatory Ventilation/ Assist Control)  FiO2 (%): 30 %  Rate Set (breaths/minute): 16 breaths/min  Tidal Volume Set (mL): 500 mL  PEEP (cm H2O): 10 cmH2O  Oxygen Concentration (%): 30 %  Resp: 16    Plan: Continue patient on full vent support and assess for weaning readiness in AM.     Starla Johnson, RT

## 2020-01-03 NOTE — PLAN OF CARE
Patient remains intubated and sedated with propofol. Fentanyl drip for pain management.   Patient's eyes open to voice/touch. Does not follow commands. Pupils equal.   Junctional/accelerated junctional rhythm on monitor.  Continuing to manage BP with vasopressin and levophed. See MAR for titration.  Managing blood sugar with insulin drip. Algorithm 4+2units/hour.   Suprapubic catheter in place. Adequate output.   Surgical drains in place. Bile drainage. See I+O flowsheet.   Big turn bed rotating patient every 5 minutes. Mepilex dressings to buttocks and coccyx assessed with bath.   Bladimir Mcdonough RN 6:54 AM 01/03/20

## 2020-01-03 NOTE — PHARMACY-VANCOMYCIN DOSING SERVICE
Pharmacy Vancomycin Note  Date of Service 2020  Patient's  1955   64 year old, female    Indication: Sepsis  Goal Trough Level: 15-20 mg/L  Day of Therapy: 4  Current Vancomycin regimen:  Intermittent dosing due to ROLANDO    Current estimated CrCl = Estimated Creatinine Clearance: 52.1 mL/min (A) (based on SCr of 1.47 mg/dL (H)).    Creatinine for last 3 days  2019:  4:00 AM Creatinine 2.50 mg/dL;  1:15 PM Creatinine 2.36 mg/dL  2020:  4:15 AM Creatinine 1.93 mg/dL;  1:05 PM Creatinine 1.72 mg/dL  2020:  4:16 AM Creatinine 1.47 mg/dL    Recent Vancomycin Levels (past 3 days)  2019: 10:35 AM Vancomycin Level 14.8 mg/L  2020:  1:05 PM Vancomycin Level 21.5 mg/L  2020:  7:05 PM Vancomycin Level 20.0 mg/L    Vancomycin IV Administrations (past 72 hours)                   vancomycin (VANCOCIN) 2,500 mg in sodium chloride 0.9 % 500 mL intermittent infusion (mg) 2,500 mg New Bag 20                Nephrotoxins and other renal medications (From now, onward)    Start     Dose/Rate Route Frequency Ordered Stop    20 220  vancomycin (VANCOCIN) 2,500 mg in sodium chloride 0.9 % 250 mL intermittent infusion      2,500 mg (central catheter)  over 60 Minutes Intravenous ONCE 20 0744  vancomycin place ortega - receiving intermittent dosing      1 each Intravenous SEE ADMIN INSTRUCTIONS 19 0744      19 0600  vasopressin (VASOSTRICT) 40 Units in D5W 40 mL infusion      2.4 Units/hr  2.4 mL/hr  Intravenous CONTINUOUS 19 0550      19 1900  norepinephrine (LEVOPHED) 16 mg in  mL infusion      0.03-0.4 mcg/kg/min × 140 kg  3.9-52.5 mL/hr  Intravenous CONTINUOUS 19 1858               Contrast Orders - past 72 hours (72h ago, onward)    Start     Dose/Rate Route Frequency Ordered Stop    19 0745  diatrizoate meglumine-sodium (GASTROGRAFIN/GASTROVIEW) 66-10 % solution 5 mL  Status:  Discontinued      5 mL Oral  ONCE 12/30/19 0742 12/31/19 1212          Interpretation of levels and current regimen:  Trough level is  Therapeutic      Renal Function: appears to be improving    Plan:  1.  Re-dose with 2500 mg x 1 dose  2.  Pharmacy will check trough levels as appropriate in 1-3 Days.    3. Serum creatinine levels will be ordered daily for the first week of therapy and at least twice weekly for subsequent weeks.      Micki Greenwood [facility]        .

## 2020-01-03 NOTE — PROGRESS NOTES
[facility] ICU RESPIRATORY NOTE        Date of Admission: 12/29/2019    Date of Intubation (most recent): 12/29/19    Reason for Mechanical Ventilation: Respiratory failure    Number of Days on Mechanical Ventilation: 6    Met Criteria for Spontaneous Breathing Trial: No    Reason for No Spontaneous Breathing Trial: Per MD    Significant Events Today:     ABG Results:   Recent Labs   Lab 01/03/20  0525 12/30/19  1303 12/30/19  0045   PH 7.40 7.37 7.44   PCO2 38 36 41   PO2 103 97 120*   HCO3 23 21 28   O2PER  --   --  100       Current Vent Settings: Ventilation Mode: CMV/AC  (Continuous Mandatory Ventilation/ Assist Control)  FiO2 (%): 30 %  Rate Set (breaths/minute): 16 breaths/min  Tidal Volume Set (mL): 500 mL  PEEP (cm H2O): 10 cmH2O  Oxygen Concentration (%): 30 %  Resp: 16      Plan: Continue full ventilatory support.    Adilene Conner, RT

## 2020-01-04 ENCOUNTER — APPOINTMENT (OUTPATIENT)
Dept: GENERAL RADIOLOGY | Facility: CLINIC | Age: 65
DRG: 853 | End: 2020-01-04
Attending: INTERNAL MEDICINE
Payer: MEDICARE

## 2020-01-04 LAB
ALBUMIN SERPL-MCNC: 2.2 G/DL (ref 3.4–5)
ALP SERPL-CCNC: 72 U/L (ref 40–150)
ALT SERPL W P-5'-P-CCNC: 444 U/L (ref 0–50)
ANION GAP SERPL CALCULATED.3IONS-SCNC: 7 MMOL/L (ref 3–14)
AST SERPL W P-5'-P-CCNC: 141 U/L (ref 0–45)
BACTERIA SPEC CULT: NO GROWTH
BASE DEFICIT BLDA-SCNC: 0.9 MMOL/L
BASOPHILS # BLD AUTO: 0 10E9/L (ref 0–0.2)
BASOPHILS NFR BLD AUTO: 0.1 %
BILIRUB SERPL-MCNC: 1 MG/DL (ref 0.2–1.3)
BUN SERPL-MCNC: 48 MG/DL (ref 7–30)
CALCIUM SERPL-MCNC: 7.3 MG/DL (ref 8.5–10.1)
CHLORIDE SERPL-SCNC: 111 MMOL/L (ref 94–109)
CO2 SERPL-SCNC: 25 MMOL/L (ref 20–32)
CREAT SERPL-MCNC: 1.12 MG/DL (ref 0.52–1.04)
DIFFERENTIAL METHOD BLD: ABNORMAL
EOSINOPHIL # BLD AUTO: 0 10E9/L (ref 0–0.7)
EOSINOPHIL NFR BLD AUTO: 0 %
ERYTHROCYTE [DISTWIDTH] IN BLOOD BY AUTOMATED COUNT: 17.3 % (ref 10–15)
GFR SERPL CREATININE-BSD FRML MDRD: 52 ML/MIN/{1.73_M2}
GLUCOSE BLDC GLUCOMTR-MCNC: 108 MG/DL (ref 70–99)
GLUCOSE BLDC GLUCOMTR-MCNC: 113 MG/DL (ref 70–99)
GLUCOSE BLDC GLUCOMTR-MCNC: 123 MG/DL (ref 70–99)
GLUCOSE BLDC GLUCOMTR-MCNC: 129 MG/DL (ref 70–99)
GLUCOSE BLDC GLUCOMTR-MCNC: 131 MG/DL (ref 70–99)
GLUCOSE BLDC GLUCOMTR-MCNC: 133 MG/DL (ref 70–99)
GLUCOSE BLDC GLUCOMTR-MCNC: 136 MG/DL (ref 70–99)
GLUCOSE BLDC GLUCOMTR-MCNC: 136 MG/DL (ref 70–99)
GLUCOSE BLDC GLUCOMTR-MCNC: 137 MG/DL (ref 70–99)
GLUCOSE BLDC GLUCOMTR-MCNC: 140 MG/DL (ref 70–99)
GLUCOSE BLDC GLUCOMTR-MCNC: 146 MG/DL (ref 70–99)
GLUCOSE BLDC GLUCOMTR-MCNC: 177 MG/DL (ref 70–99)
GLUCOSE BLDC GLUCOMTR-MCNC: 198 MG/DL (ref 70–99)
GLUCOSE BLDC GLUCOMTR-MCNC: 249 MG/DL (ref 70–99)
GLUCOSE BLDC GLUCOMTR-MCNC: 264 MG/DL (ref 70–99)
GLUCOSE SERPL-MCNC: 179 MG/DL (ref 70–99)
HCO3 BLD-SCNC: 24 MMOL/L (ref 21–28)
HCT VFR BLD AUTO: 31 % (ref 35–47)
HGB BLD-MCNC: 10 G/DL (ref 11.7–15.7)
IMM GRANULOCYTES # BLD: 0.5 10E9/L (ref 0–0.4)
IMM GRANULOCYTES NFR BLD: 2 %
LYMPHOCYTES # BLD AUTO: 0.3 10E9/L (ref 0.8–5.3)
LYMPHOCYTES NFR BLD AUTO: 1.2 %
Lab: NORMAL
MCH RBC QN AUTO: 28.3 PG (ref 26.5–33)
MCHC RBC AUTO-ENTMCNC: 32.3 G/DL (ref 31.5–36.5)
MCV RBC AUTO: 88 FL (ref 78–100)
MONOCYTES # BLD AUTO: 1.6 10E9/L (ref 0–1.3)
MONOCYTES NFR BLD AUTO: 6.2 %
NEUTROPHILS # BLD AUTO: 22.9 10E9/L (ref 1.6–8.3)
NEUTROPHILS NFR BLD AUTO: 90.5 %
NRBC # BLD AUTO: 0.1 10*3/UL
NRBC BLD AUTO-RTO: 0 /100
O2/TOTAL GAS SETTING VFR VENT: 30 %
OXYHGB MFR BLD: 98 % (ref 92–100)
PCO2 BLD: 42 MM HG (ref 35–45)
PH BLD: 7.37 PH (ref 7.35–7.45)
PLATELET # BLD AUTO: 148 10E9/L (ref 150–450)
PO2 BLD: 145 MM HG (ref 80–105)
POTASSIUM SERPL-SCNC: 3.8 MMOL/L (ref 3.4–5.3)
PROT SERPL-MCNC: 4.7 G/DL (ref 6.8–8.8)
RBC # BLD AUTO: 3.53 10E12/L (ref 3.8–5.2)
SODIUM SERPL-SCNC: 143 MMOL/L (ref 133–144)
SPECIMEN SOURCE: NORMAL
WBC # BLD AUTO: 25.3 10E9/L (ref 4–11)

## 2020-01-04 PROCEDURE — 00000146 ZZHCL STATISTIC GLUCOSE BY METER IP

## 2020-01-04 PROCEDURE — 25800030 ZZH RX IP 258 OP 636: Performed by: PHYSICIAN ASSISTANT

## 2020-01-04 PROCEDURE — 94003 VENT MGMT INPAT SUBQ DAY: CPT

## 2020-01-04 PROCEDURE — 40000008 ZZH STATISTIC AIRWAY CARE

## 2020-01-04 PROCEDURE — 25800030 ZZH RX IP 258 OP 636: Performed by: INTERNAL MEDICINE

## 2020-01-04 PROCEDURE — 80053 COMPREHEN METABOLIC PANEL: CPT | Performed by: INTERNAL MEDICINE

## 2020-01-04 PROCEDURE — 25000128 H RX IP 250 OP 636: Performed by: PHYSICIAN ASSISTANT

## 2020-01-04 PROCEDURE — 99291 CRITICAL CARE FIRST HOUR: CPT | Mod: 24 | Performed by: INTERNAL MEDICINE

## 2020-01-04 PROCEDURE — 71045 X-RAY EXAM CHEST 1 VIEW: CPT

## 2020-01-04 PROCEDURE — 25000128 H RX IP 250 OP 636: Performed by: INTERNAL MEDICINE

## 2020-01-04 PROCEDURE — 25000132 ZZH RX MED GY IP 250 OP 250 PS 637: Mod: GY | Performed by: INTERNAL MEDICINE

## 2020-01-04 PROCEDURE — C9113 INJ PANTOPRAZOLE SODIUM, VIA: HCPCS | Performed by: PHYSICIAN ASSISTANT

## 2020-01-04 PROCEDURE — 85025 COMPLETE CBC W/AUTO DIFF WBC: CPT | Performed by: INTERNAL MEDICINE

## 2020-01-04 PROCEDURE — 25000131 ZZH RX MED GY IP 250 OP 636 PS 637: Mod: GY | Performed by: INTERNAL MEDICINE

## 2020-01-04 PROCEDURE — 25000125 ZZHC RX 250: Performed by: INTERNAL MEDICINE

## 2020-01-04 PROCEDURE — 93010 ELECTROCARDIOGRAM REPORT: CPT | Performed by: INTERNAL MEDICINE

## 2020-01-04 PROCEDURE — 87040 BLOOD CULTURE FOR BACTERIA: CPT | Performed by: INTERNAL MEDICINE

## 2020-01-04 PROCEDURE — 82805 BLOOD GASES W/O2 SATURATION: CPT | Performed by: INTERNAL MEDICINE

## 2020-01-04 PROCEDURE — 20000003 ZZH R&B ICU

## 2020-01-04 PROCEDURE — 40000275 ZZH STATISTIC RCP TIME EA 10 MIN

## 2020-01-04 PROCEDURE — 25000125 ZZHC RX 250: Performed by: PHYSICIAN ASSISTANT

## 2020-01-04 PROCEDURE — 93005 ELECTROCARDIOGRAM TRACING: CPT

## 2020-01-04 RX ORDER — KETAMINE HYDROCHLORIDE 10 MG/ML
10 INJECTION, SOLUTION INTRAMUSCULAR; INTRAVENOUS
Status: DISCONTINUED | OUTPATIENT
Start: 2020-01-04 | End: 2020-01-07

## 2020-01-04 RX ORDER — KETAMINE HYDROCHLORIDE 10 MG/ML
10 INJECTION, SOLUTION INTRAMUSCULAR; INTRAVENOUS EVERY 4 HOURS PRN
Status: DISCONTINUED | OUTPATIENT
Start: 2020-01-04 | End: 2020-01-04

## 2020-01-04 RX ADMIN — KETAMINE HYDROCHLORIDE 10 MG: 10 INJECTION INTRAMUSCULAR; INTRAVENOUS at 23:04

## 2020-01-04 RX ADMIN — HYDROCORTISONE SODIUM SUCCINATE 50 MG: 100 INJECTION, POWDER, FOR SOLUTION INTRAMUSCULAR; INTRAVENOUS at 16:00

## 2020-01-04 RX ADMIN — PROPOFOL 15 MCG/KG/MIN: 10 INJECTION, EMULSION INTRAVENOUS at 10:04

## 2020-01-04 RX ADMIN — VASOPRESSIN 2.4 UNITS/HR: 20 INJECTION INTRAVENOUS at 03:53

## 2020-01-04 RX ADMIN — ASCORBIC ACID 1500 MG: 500 INJECTION, SOLUTION INTRAMUSCULAR; INTRAVENOUS; SUBCUTANEOUS at 08:15

## 2020-01-04 RX ADMIN — MEROPENEM 1 G: 1 INJECTION, POWDER, FOR SOLUTION INTRAVENOUS at 23:50

## 2020-01-04 RX ADMIN — SODIUM CHLORIDE 3 UNITS/HR: 9 INJECTION, SOLUTION INTRAVENOUS at 00:00

## 2020-01-04 RX ADMIN — QUETIAPINE 25 MG: 25 TABLET ORAL at 08:13

## 2020-01-04 RX ADMIN — CHLORHEXIDINE GLUCONATE 15 ML: 1.2 RINSE ORAL at 08:25

## 2020-01-04 RX ADMIN — DEXTROSE MONOHYDRATE: 50 INJECTION, SOLUTION INTRAVENOUS at 04:06

## 2020-01-04 RX ADMIN — MIDAZOLAM HYDROCHLORIDE 2 MG: 1 INJECTION, SOLUTION INTRAMUSCULAR; INTRAVENOUS at 16:58

## 2020-01-04 RX ADMIN — ASCORBIC ACID 1500 MG: 500 INJECTION, SOLUTION INTRAMUSCULAR; INTRAVENOUS; SUBCUTANEOUS at 14:19

## 2020-01-04 RX ADMIN — HYDROCORTISONE SODIUM SUCCINATE 50 MG: 100 INJECTION, POWDER, FOR SOLUTION INTRAMUSCULAR; INTRAVENOUS at 01:16

## 2020-01-04 RX ADMIN — QUETIAPINE 25 MG: 25 TABLET ORAL at 20:32

## 2020-01-04 RX ADMIN — HYDROCORTISONE SODIUM SUCCINATE 50 MG: 100 INJECTION, POWDER, FOR SOLUTION INTRAMUSCULAR; INTRAVENOUS at 08:13

## 2020-01-04 RX ADMIN — CHLORHEXIDINE GLUCONATE 15 ML: 1.2 RINSE ORAL at 19:47

## 2020-01-04 RX ADMIN — LEVOTHYROXINE SODIUM ANHYDROUS 50 MCG: 100 INJECTION, POWDER, LYOPHILIZED, FOR SOLUTION INTRAVENOUS at 14:18

## 2020-01-04 RX ADMIN — ASCORBIC ACID 1500 MG: 500 INJECTION, SOLUTION INTRAMUSCULAR; INTRAVENOUS; SUBCUTANEOUS at 03:04

## 2020-01-04 RX ADMIN — SODIUM CHLORIDE 5.5 UNITS/HR: 9 INJECTION, SOLUTION INTRAVENOUS at 05:19

## 2020-01-04 RX ADMIN — SODIUM CHLORIDE 3 UNITS/HR: 9 INJECTION, SOLUTION INTRAVENOUS at 13:41

## 2020-01-04 RX ADMIN — ENOXAPARIN SODIUM 40 MG: 40 INJECTION SUBCUTANEOUS at 16:01

## 2020-01-04 RX ADMIN — Medication 125 MCG/HR: at 11:24

## 2020-01-04 RX ADMIN — MEROPENEM 1 G: 1 INJECTION, POWDER, FOR SOLUTION INTRAVENOUS at 01:11

## 2020-01-04 RX ADMIN — PROPOFOL 25 MCG/KG/MIN: 10 INJECTION, EMULSION INTRAVENOUS at 03:51

## 2020-01-04 RX ADMIN — KETAMINE HYDROCHLORIDE 10 MG: 10 INJECTION INTRAMUSCULAR; INTRAVENOUS at 20:29

## 2020-01-04 RX ADMIN — MEROPENEM 1 G: 1 INJECTION, POWDER, FOR SOLUTION INTRAVENOUS at 15:59

## 2020-01-04 RX ADMIN — DEXTROSE MONOHYDRATE: 50 INJECTION, SOLUTION INTRAVENOUS at 16:00

## 2020-01-04 RX ADMIN — PANTOPRAZOLE SODIUM 40 MG: 40 INJECTION, POWDER, FOR SOLUTION INTRAVENOUS at 08:13

## 2020-01-04 RX ADMIN — THIAMINE HYDROCHLORIDE 200 MG: 100 INJECTION, SOLUTION INTRAMUSCULAR; INTRAVENOUS at 08:15

## 2020-01-04 RX ADMIN — MEROPENEM 1 G: 1 INJECTION, POWDER, FOR SOLUTION INTRAVENOUS at 08:13

## 2020-01-04 RX ADMIN — ASCORBIC ACID 1500 MG: 500 INJECTION, SOLUTION INTRAMUSCULAR; INTRAVENOUS; SUBCUTANEOUS at 20:18

## 2020-01-04 RX ADMIN — HYDROCORTISONE SODIUM SUCCINATE 50 MG: 100 INJECTION, POWDER, FOR SOLUTION INTRAMUSCULAR; INTRAVENOUS at 23:50

## 2020-01-04 ASSESSMENT — ACTIVITIES OF DAILY LIVING (ADL)
ADLS_ACUITY_SCORE: 29

## 2020-01-04 NOTE — PROVIDER NOTIFICATION
Pt's SB with HR 40's. Pt on low dose propofol. Dr. Velasco notified. Propofol stopped, EKG ordered. No further orders at this time.

## 2020-01-04 NOTE — PLAN OF CARE
Pt remains on full vent support sedated with propofol and fentanyl gtt for pain. Pt opens eyes to voice but doesn't track or follow. Pt remains on vasopressin gtt - levo gtt restarted d/t hypotensive cuff and A-line pressures. Currently levo at 0.06. THAI output moderate. Tube feeding at goal rate, insulin gtt reordered d/t increasing glucose readings. Currently at algorithm 3, 5.5 U/hr, now stable. Family called - updated on plan of care over phone. Cont to monitor.

## 2020-01-04 NOTE — PROGRESS NOTES
[facility] ICU RESPIRATORY NOTE           Date of Admission: 12/29/2019     Date of Intubation (most recent): 12/29/19     Reason for Mechanical Ventilation: Respiratory failure     Number of Days on Mechanical Ventilation: 7     Met Criteria for Spontaneous Breathing Trial: No     Reason for No Spontaneous Breathing Trial: Per MD     Significant Events Today: None overnight     ABG Results:   Recent Labs   Lab 01/04/20  0422 01/03/20  0525 12/30/19  1303 12/30/19  0045   PH 7.37 7.40 7.37 7.44   PCO2 42 38 36 41   PO2 145* 103 97 120*   HCO3 24 23 21 28   O2PER 30  --   --  100     Ventilation Mode: CMV/AC  (Continuous Mandatory Ventilation/ Assist Control)  FiO2 (%): 30 %  Rate Set (breaths/minute): 16 breaths/min  Tidal Volume Set (mL): 500 mL  PEEP (cm H2O): 10 cmH2O  Oxygen Concentration (%): 30 %  Resp: 16    Will continue to follow.     Juan Francisco Marley, RT

## 2020-01-04 NOTE — PLAN OF CARE
Pt remains on full vent support sedated with propofol and fentanyl gtt for pain. Pt opens eyes to voice but doesn't track or follow. Pt remains on vasopressin gtt - able to wean off norepi gtt (using cuff pressures). THAI output moderate. Tube feeding rate unchanged. Family at bedside - updated on plan of care. Cont to monitor.

## 2020-01-04 NOTE — PROGRESS NOTES
General Surgery Progress Note    Subjective:  NAOE. Remains intubated and sedated.     Objective:  /58   Pulse 55   Temp 97.5  F (36.4  C)   Resp 16   Wt 131 kg (288 lb 12.8 oz)   SpO2 99%   BMI 48.81 kg/m    General: Patient resting in bed, intubated and sedated.  Neuro: Pupils pinpoint.   HEENT: NCAT.  CV: Bradycardic. Heart sounds distant.   Pulm: Mechanically ventilated. CTAB, no w/r/r.  GI: Obese abdomen, soft, nd. Dressings c/d/I. Malecot drain w/ bilious output. THAI drains w/ sliding scale output.   : Lundberg in place w/ clear yellow urine.   Ext: No peripheral edema BLE.     Labs: reviewed    Imaging: reviewed, continued bilateral pleural effusions and R>L opacities    Assessment and Plan:  Bhavani White is a morbidly obese 65 yo F w/ PMH of DM, ANIYA, and hypothyroidism, who was admitted to [facility] on 12/29/20 w/ perforated duodenal ulcer. Likely aspiration during transfer, intubated in ED. S/p ex lap, TRUONG, Malecot placement in perforation, and TAC on 12/29. S/p ex lap, TRUONG, closure of duodenal perf over Malecot, G tube, jejunostomy, and delayed primary closure on 1/01. Remains intubated and sedated. Stable pressor requirement past 24 hrs. Abd exam benign. Would keep TFs trickle while on pressor.    N: Fentanyl and propofol for pain control and sedation. Seroquel.   CV: Sinus rhythm, occasional bradycardia, stable NE and vasopressin requirement. Tapering steroids.   P: Ventilator management per intensivist.   GI: Operative course as above. Continue trickle TFs. Monitor drain output. Monitor LFTs. Bilirubin WNL, mild transaminitis improving.   FEN: Electrolytes WNL. On D5 until TFs increased.   R: Cr improved to 1.1 today, adequate UOP, Lundberg for strict I/O.   E: DM/stress induced hyperglycemia, on insulin gtt. PTA Synthroid.   H: Acute blood loss anemia, Hgb stable.   ID: Intra-abdominal sepsis, PNA, continue broad spectrum abx. Completed course of Diflucan.   Ppx: PPI, ok to start chemical dvt ppx  from surgery perspective.     Clement Decker MD  General Surgery PGY-4  Pager: [phone]

## 2020-01-04 NOTE — PROGRESS NOTES
Critical Care Progress Note      01/04/2020    Name: Bhavani White MRN#: [id number]   Age: 64 year old YOB: 1955     Hsptl Day# 6  ICU DAY #5    MV DAY #5             Problem List:   Principal Problem:    Duodenal ulcer perforation (H)  Active Problems:    Elevated BMI    Perforated duodenal ulcer (H)    Acute respiratory failure with hypoxia (H)    NSTEMI (non-ST elevated myocardial infarction) (H)    Septic shock (H)           Summary/Hospital Course:     64 year old female with history of chronic pain on methadone, obesity, ANIYA, hypothyroidism, DM and prior abdominal surgery presented to ED this evening with altered mental status with abdominal pain and emesis discovered to have perforated gastric ulcer on imaging.      Per EMS notes patient not feeling well with 3 day prodrome not taking meds (methadone) with new altered mental status this evening prompting her  to call to 911.  Per EMS en route, patient vomited 'fecal material' with suspected aspiration. On arrival in ED, patient noted to be altered tachypneic, thrashing with cool, mottled skin and sats in 40% (?)  prompting immediate intubation for respiratory failure. Hemodynamics initially appropriate with MAP in 70s. EKG with some dynamic changes on initial tracing with new incomplete bundle branch block Labs notable for ED work notable for tropon ~20 , lactic acid 3 , ROLANDO 1.6 and acute hepatitis with LFTs in 700's.  WBC 20K, elevated Hgb 19. CT CAP demonstrated small effusions patchy GGO in RML consistent with aspiration, fat stranding around the pylorus and anterior duodenum suspicious for perforation as small amount of extraluminal gas.      TLC subsequently placed, zosyn started and 3L NS bolused. Surgery contacted in ED with recommendations for conservative management given unclear status of AMI, case also discussed with Cardiology       12/29 - admit > decompensated into shock  4 pressors requirement  12/30  - OR for  exploration no overt leak, noted gastric and duodenal ischemia and hemorrhage, blood evacuated and drain placed   12/31 - stabilized hemodynamics but remains on two pressors at similar doses , D5W for Hyper Na  1/1 - back to OR unable to repair per 2/2 size, drain placed for containment, bowel ischemia improved on visualization, no change in pressor requirements  1/2 - no major events, variable pressor requirements, UOP stable, trickle feeds started , blood culture from art line GPC  1/3- no major events. Blood cx growing staph hominis           Interim History:      -Blood cultures + for staph hominis   -More bradycardic this AM (40-50's)      Assessment and plan :     Bhavani White IS a 64 year old female admitted on 12/29/2019 for abdominal sepsis 2/2 perforated duodenal ulcer, elevated troponins/NSTEMI, acute hypoxemic respiratory failure requiring intubation.      I have personally reviewed the daily labs, imaging studies, cultures and discussed the case with referring physician and consulting physicians.      My assessment and plan by system for this patient is as follows:     Neurology/Psychiatry:   1. Encephalopathy - metabolic, septic - stable     2. Pain/analgesia, chronic, on chronic methadone  3. Anxiety 2/2 acute illness  Plan  - continue fentanyl gtt, titrate to effect   - propofol gtt titrated off this afternoon 2/2 to bradycardia  - goal RASS -2 , daily sedation holiday as indicated  - continue seroquel  - continue prn versed     Cardiovascular:   1. Shock, mixed septic, cardiogenic - still pressor dependent, responsive to fluid initially, and improved with calcium supplementation -   2. NSTEMI /septic cardiomyopathy trop ~20 peak;  3. Rhythm - new bradycardia today, ? Sedation related. No ischemic changes on EKG today  Plan  - continue hemodynamic support, MAP >65 , wean pressors as able   - continue HAT treatment    - PRN fluid challenge as indicated  - defer further cardiac eval given current  condition and unable to anticoagulate (intra-abd bleed prior attempt)     Pulmonary/Ventilator Management:   1. Acute hypoxemic respiratory failure - 2/2 mixed shock - stable vent and O2 requirements  ? Suspect element of chronic hypoxemia given polycythemia - stable ventilatory requirements and oxygenation  2. Aspiration Pneumonia - noted RML GGO  Plan  - continue lung protective ventilation, keep increase PEEP 2/2 habitus and atelectaiss (10 cmH20)  - antibiotics as below   - serial ABG and CXR as indicated     GI and Nutrition :   1. Perforated ulcer contained s/p ex lap with drainage   2. Bowel ischemia - in setting of shock and hemorrhage - improved on 2nd look and hemodynamic support  3. Shock liver  - multifactorial - sepsis, meds, fatty liver - stable on serial eval, imaging, improving   Plan  - surgery following - post op care and drain management   - continue supportive cares  - serial labs and eval as indicated  - Continue trickle TF     Renal/Fluids/Electrolytes:   1. ROLANDO- from sepsis Cr 2.5 peak - downtrending now, 1.1 this AM (baseline < 0.8).  Electrolytes within normal,  acid base stable   2. Hypernatremia - related to volume, D5 started, improved   3. Chronic cystitis - with existing suprapubic catheter  Plan  - monitor function and electrolytes;  as needed with replacement per ICU protocols.   - generally avoid nephrotoxic agents such as NSAID, IV contrast unless specifically required  - adjust medications as needed for renal clearance  - follow I/O's as appropriate.  - maintain hemodynamics, prn volume challenge as appropriate (match outputs as able)   - Continue D5W plus water flushes , goal       Infectious Disease:   1. Abdominal sepsis secondary to perforated duodenal ulcer; zosyn started > broadened to meropenem, fungal prophylaxis given containment issue x5  2. Staph hominis bacteremia - 2/2 above, blood cultures 1/2 positive   3. Aspiration pneumonitis, high potential for aspiration  PNA  4. Hx of polymicrobial UTI/cystitis vs colonization with suprapubic cath - Hx of MRSA   Plan  - cont meropenem, discontinue fungal prophylaxis (5 days)   - blood cx from 1/2 growing staph hominis again, needs line exchange. A line removed today. Will be difficult to place new line in internal jugular or femoral given body habitus. Will plan for PICC tomorrow AM.   -Recheck Bcx from internal jugular CVC today   - continue HAT given continued pressor requirement     Endocrine:   1. H/o DMII  2. Stress induced hyperglycemia  3. Hx of iatrogenic cushings disease - previously on steroids  4. Hypothyroidism  Plan  - ICU insulin protocol, goal sugar <180, discontinue insulin gtt  - IV levothyroxine  - stress dose steroids, weaned to 50 q8  - continue D5W until Na 140      Hematology/Oncology:   1. Acute blood loss anemia - in setting of perf ulcer/AC, s/p drainage, Hgb 19 on admission,    2. Polycythemia- may be suggestive of dehydration, but patient with hematocrit of > 17 on previous admission.    3. Thrombocytopenia - sepsis related, stable   Plan  - monitor daily CBC     IV/Access:   1. Venous access - PIV. TLC   2. Arterial access - radial arterial line pulled today  Plan  - central access to be established if patient is requiring vasopressors     ICU Prophylaxis:   1. DVT: lovenox (ok with surgery)  2. VAP: HOB 30 degrees, chlorhexidine rinse  3. Stress Ulcer: PPI  4. Restraints: Nonviolent soft two point restraints required and necessary for patient safety and continued cares and good effect as patient continues to pull at necessary lines, tubes despite education and distraction. Will readdress daily.   5. Wound care -per surgery and unit routine   6. Feeding - NPO for medical reasons. NGT to suction.    7. Family Update: no  8. Disposition - ICU.  Prognosis guarded.      Billing: This patient is critically ill: Yes. Total critical care time today 45 min excluding procedures.    Steven Velasco MD  Pulmonary  & Critical Care Medicine  [facility]  Pager: [phone]    Key goals for next 24 hours:   1. Repeat blood cx from CVC  2. Pull jose   3. Continue mechanical ventilation  4. Continue supportive cares  5. Free water  6. PICC in AM    7. Start Lovenox for DVT ppx          Key Medications:       ascorbic acid intermittent infusion  1,500 mg Intravenous Q6H     chlorhexidine  15 mL Mouth/Throat Q12H     hydrocortisone sodium succinate PF  50 mg Intravenous Q8H     levothyroxine  50 mcg Intravenous Daily     meropenem  1 g Intravenous Q8H     pantoprazole (PROTONIX) IV  40 mg Intravenous Daily     QUEtiapine  25 mg Oral BID     sodium chloride (PF)  3 mL Intracatheter Q8H     thiamine  200 mg Intravenous Daily       dextrose       D5W 110 mL/hr at 01/04/20 0800     fentaNYL 125 mcg/hr (01/04/20 1124)     insulin (regular) 3 Units/hr (01/04/20 1341)     - MEDICATION INSTRUCTIONS -       norepinephrine 0.03 mcg/kg/min (01/04/20 0800)     - MEDICATION INSTRUCTIONS -       propofol (DIPRIVAN) infusion 15 mcg/kg/min (01/04/20 1004)     ACE/ARB/ARNI NOT PRESCRIBED       BETA BLOCKER NOT PRESCRIBED       STATIN NOT PRESCRIBED       vasopressin (PITRESSIN) infusion ADULT (40 mL) 2.4 Units/hr (01/04/20 0800)               Physical Examination:   Temp:  [92.3  F (33.5  C)-99.1  F (37.3  C)] 97  F (36.1  C)  Pulse:  [46-93] 46  Heart Rate:  [45-75] 45  Resp:  [16] 16  BP: ()/(46-76) 96/55  MAP:  [46 mmHg-221 mmHg] 59 mmHg  Arterial Line BP: ()/() 91/45  FiO2 (%):  [30 %] 30 %  SpO2:  [95 %-100 %] 99 %      Intake/Output Summary (Last 24 hours) at 1/3/2020 1317  Last data filed at 1/3/2020 1300  Gross per 24 hour   Intake 3631.76 ml   Output 2040 ml   Net 1591.76 ml         Wt Readings from Last 4 Encounters:   01/04/20 131 kg (288 lb 12.8 oz)   11/01/19 113.4 kg (250 lb)   10/28/19 121.7 kg (268 lb 3.2 oz)   08/29/19 117.5 kg (259 lb)     Arterial Line BP: ()/() 91/45  MAP:  [46 mmHg-221  mmHg] 59 mmHg  BP - Mean:  [56-95] 62  Ventilation Mode: CMV/AC  (Continuous Mandatory Ventilation/ Assist Control)  FiO2 (%): 30 %  Rate Set (breaths/minute): 16 breaths/min  Tidal Volume Set (mL): 500 mL  PEEP (cm H2O): 10 cmH2O  Oxygen Concentration (%): 30 %  Resp: 16    Recent Labs   Lab 01/04/20  0422 01/03/20  0525 12/30/19  1303 12/30/19  0045   PH 7.37 7.40 7.37 7.44   PCO2 42 38 36 41   PO2 145* 103 97 120*   HCO3 24 23 21 28   O2PER 30  --   --  100       GEN: intubated sedate not following commands   HEENT: head ncat, sclera anicteric, OP patent, trachea midline   PULM: unlabored synchronous with vent, clear anteriorly diminished at bases and laterally   CV/COR: tachycardic irregular S1S2 no gallop,  No rub, no murmur  ABD:  Obese soft  Mild tenderness  Midline tube c/d/i hypoactive bowel sounds, no mass noted hernia  EXT: warm, no edema   NEURO: limited by sedation    SKIN: no obvious rash  LINES: clean, dry intact         Data:   All data and imaging reviewed     ROUTINE ICU LABS (Last four results)  CMP  Recent Labs   Lab 01/04/20  0416 01/03/20  0402 01/02/20  0416 01/01/20  2130 01/01/20  1305 01/01/20  0415  12/31/19  0400  12/30/19  0420 12/29/19  2130    145* 148* 149* 152* 151*   < > 146*   < >  --  146*   POTASSIUM 3.8 4.3 3.9  --  3.8 3.6   < > 3.7   < >  --  3.9   CHLORIDE 111* 115* 118*  --  122* 121*   < > 115*   < >  --  109   CO2 25 24 22  --  23 21   < > 19*   < >  --  28   ANIONGAP 7 6 8  --   --  9   < > 12   < >  --  9   * 124* 133*  --  152* 126*   < > 285*   < >  --  189*   BUN 48* 50* 59*  --  66* 75*   < > 86*   < >  --  60*   CR 1.12* 1.18* 1.47*  --  1.72* 1.93*   < > 2.50*   < >  --  1.40*   GFRESTIMATED 52* 49* 38*  --  31* 27*   < > 20*   < >  --  40*   GFRESTBLACK 60* 57* 44*  --  35* 31*   < > 23*   < >  --  46*   DORY 7.3* 6.8* 6.5*  --  6.7* 6.2*   < > 6.3*   < >  --  8.6   MAG  --   --  2.2  --   --   --   --  1.8  --  2.2 2.1   PHOS  --   --   --   --   --    --   --   --   --   --  2.4*   PROTTOTAL 4.7* 4.6* 4.8*  --  5.0* 5.1*   < > 4.8*   < >  --  5.8*   ALBUMIN 2.2* 2.1* 2.3*  --  2.6* 2.7*   < > 2.7*   < >  --  2.5*   BILITOTAL 1.0 1.1 0.9  --  1.1 1.3   < > 2.5*   < >  --  1.0   ALKPHOS 72 73 89  --  82 81   < > 74   < >  --  97   * 281* 391*  --  433* 531*   < > 977*   < >  --  567*   * 669* 842*  --  965* 1,041*   < > 1,263*   < >  --  738*    < > = values in this interval not displayed.     CBC  Recent Labs   Lab 01/04/20  0416 01/03/20  0402 01/02/20  0416 01/01/20  1345   WBC 25.3* 27.7* 26.1* 23.2*   RBC 3.53* 3.85 4.19 4.42   HGB 10.0* 10.7* 11.8 12.3   HCT 31.0* 33.3* 36.0 37.9   MCV 88 87 86 86   MCH 28.3 27.8 28.2 27.8   MCHC 32.3 32.1 32.8 32.5   RDW 17.3* 17.1* 17.1* 16.7*   * 119* 146* 140*     INR  Recent Labs   Lab 12/29/19  1535 12/29/19  1436   INR 1.61* Canceled, Test credited     Arterial Blood Gas  Recent Labs   Lab 01/04/20  0422 01/03/20  0525 12/30/19  1303 12/30/19  0045   PH 7.37 7.40 7.37 7.44   PCO2 42 38 36 41   PO2 145* 103 97 120*   HCO3 24 23 21 28   O2PER 30  --   --  100       All cultures:  Recent Labs   Lab 01/02/20  1233 12/30/19  0425 12/29/19  1507 12/29/19  1435   CULT Cultured on the 1st day of incubation:  Staphylococcus hominis  *  Critical Value/Significant Value, preliminary result only, called to and read back by  Sangeeta Taylor RN [facility] 1.3.20 @9329 AE    Susceptibility testing done on previous specimen Methicillin resistant Staphylococcus aureus (MRSA) isolated* No growth Cultured on the 1st day of incubation:  Staphylococcus hominis  *  Critical Value/Significant Value, preliminary result only, called to and read back by  Suhail Escobar RN from [facility] ICU. 12/30/19 at 1202.TV.    Cultured on the 1st day of incubation:  Staphylococcus epidermidis  *  (Note)  POSITIVE for Staphylococci other than S.aureus, S.epidermidis and  S.lugdunensis, by [employer] multiplex nucleic acid  test.  Coagulase-negative staphylococci are the most common venipuncture or  collection associated skin CONTAMINANTS grown in blood cultures.  Final identification and antimicrobial susceptibility testing will be  verified by standard methods.    Specimen tested with [employer] multiplex, gram-positive blood culture  nucleic acid test for the following targets: Staph aureus, Staph  epidermidis, Staph lugdunensis, other Staph species, Enterococcus  faecalis, Enterococcus faecium, Streptococcus species, S. agalactiae,  S. anginosus grp., S. pneumoniae, S. pyogenes, Listeria sp., mecA  (methicillin resistance) and Nick/B (vancomycin resistance).    Critical Value/Significant Value called to and read back by FANNY SAMANO RN 12/30/2019 @ [address] [place] [address]       Recent Results (from the past 24 hour(s))   XR Chest Port 1 View    Narrative    XR CHEST PORT 1 VW   1/4/2020 5:26 AM     HISTORY: intubated shock    COMPARISON: 1/1/2020      Impression    IMPRESSION: Stable enlargement of the cardiac silhouette with  pulmonary venous congestion. Unchanged bilateral layering pleural  effusions. Unchanged patchy bibasilar airspace opacities.    Lines and tubes in similar position. Partial visualization of lower  ACDF hardware.    MAR RITTER MD

## 2020-01-04 NOTE — PROGRESS NOTES
[facility] ICU RESPIRATORY NOTE        Date of Admission: 12/29/2019    Date of Intubation (most recent):12/29/2019    Reason for Mechanical Ventilation:Respiratory failure    Number of Days on Mechanical Ventilation:7    Met Criteria for Spontaneous Breathing Trial:No    Reason for No Spontaneous Breathing Trial:Per MD    Significant Events Today:None    ABG Results:   Recent Labs   Lab 01/04/20  0422 01/03/20  0525 12/30/19  1303 12/30/19  0045   PH 7.37 7.40 7.37 7.44   PCO2 42 38 36 41   PO2 145* 103 97 120*   HCO3 24 23 21 28   O2PER 30  --   --  100       Current Vent Settings: Ventilation Mode: CMV/AC  (Continuous Mandatory Ventilation/ Assist Control)  FiO2 (%): 30 %  Rate Set (breaths/minute): 16 breaths/min  Tidal Volume Set (mL): 500 mL  PEEP (cm H2O): 10 cmH2O  Oxygen Concentration (%): 30 %  Resp: 16      Plan:Will cont full vent support for now and will assess for weaning readiness daily.    Kimberly Hawkins, RT

## 2020-01-04 NOTE — PLAN OF CARE
PT/OT: Per discussion with RN, pt not appropriate for therapy evals today; RN recommended therapy check back again on Monday.

## 2020-01-05 LAB
ALBUMIN SERPL-MCNC: 1.9 G/DL (ref 3.4–5)
ALP SERPL-CCNC: 73 U/L (ref 40–150)
ALT SERPL W P-5'-P-CCNC: 292 U/L (ref 0–50)
ANION GAP SERPL CALCULATED.3IONS-SCNC: 5 MMOL/L (ref 3–14)
AST SERPL W P-5'-P-CCNC: 80 U/L (ref 0–45)
BILIRUB SERPL-MCNC: 1.1 MG/DL (ref 0.2–1.3)
BUN SERPL-MCNC: 42 MG/DL (ref 7–30)
CALCIUM SERPL-MCNC: 8 MG/DL (ref 8.5–10.1)
CHLORIDE SERPL-SCNC: 110 MMOL/L (ref 94–109)
CO2 SERPL-SCNC: 28 MMOL/L (ref 20–32)
CREAT SERPL-MCNC: 0.94 MG/DL (ref 0.52–1.04)
ERYTHROCYTE [DISTWIDTH] IN BLOOD BY AUTOMATED COUNT: 17.4 % (ref 10–15)
GFR SERPL CREATININE-BSD FRML MDRD: 65 ML/MIN/{1.73_M2}
GLUCOSE BLDC GLUCOMTR-MCNC: 107 MG/DL (ref 70–99)
GLUCOSE BLDC GLUCOMTR-MCNC: 109 MG/DL (ref 70–99)
GLUCOSE BLDC GLUCOMTR-MCNC: 111 MG/DL (ref 70–99)
GLUCOSE BLDC GLUCOMTR-MCNC: 118 MG/DL (ref 70–99)
GLUCOSE BLDC GLUCOMTR-MCNC: 119 MG/DL (ref 70–99)
GLUCOSE BLDC GLUCOMTR-MCNC: 120 MG/DL (ref 70–99)
GLUCOSE BLDC GLUCOMTR-MCNC: 123 MG/DL (ref 70–99)
GLUCOSE BLDC GLUCOMTR-MCNC: 124 MG/DL (ref 70–99)
GLUCOSE BLDC GLUCOMTR-MCNC: 130 MG/DL (ref 70–99)
GLUCOSE BLDC GLUCOMTR-MCNC: 139 MG/DL (ref 70–99)
GLUCOSE BLDC GLUCOMTR-MCNC: 146 MG/DL (ref 70–99)
GLUCOSE SERPL-MCNC: 119 MG/DL (ref 70–99)
HCT VFR BLD AUTO: 30.8 % (ref 35–47)
HGB BLD-MCNC: 9.7 G/DL (ref 11.7–15.7)
MCH RBC QN AUTO: 27.6 PG (ref 26.5–33)
MCHC RBC AUTO-ENTMCNC: 31.5 G/DL (ref 31.5–36.5)
MCV RBC AUTO: 88 FL (ref 78–100)
PLATELET # BLD AUTO: 184 10E9/L (ref 150–450)
POTASSIUM SERPL-SCNC: 3.3 MMOL/L (ref 3.4–5.3)
PROT SERPL-MCNC: 4.4 G/DL (ref 6.8–8.8)
RBC # BLD AUTO: 3.51 10E12/L (ref 3.8–5.2)
SODIUM SERPL-SCNC: 143 MMOL/L (ref 133–144)
WBC # BLD AUTO: 20.8 10E9/L (ref 4–11)

## 2020-01-05 PROCEDURE — 25000128 H RX IP 250 OP 636: Performed by: INTERNAL MEDICINE

## 2020-01-05 PROCEDURE — 85027 COMPLETE CBC AUTOMATED: CPT | Performed by: INTERNAL MEDICINE

## 2020-01-05 PROCEDURE — 25800030 ZZH RX IP 258 OP 636: Performed by: INTERNAL MEDICINE

## 2020-01-05 PROCEDURE — 20000003 ZZH R&B ICU

## 2020-01-05 PROCEDURE — 25000125 ZZHC RX 250: Performed by: INTERNAL MEDICINE

## 2020-01-05 PROCEDURE — 25000125 ZZHC RX 250: Performed by: PHYSICIAN ASSISTANT

## 2020-01-05 PROCEDURE — 25000128 H RX IP 250 OP 636: Performed by: PHYSICIAN ASSISTANT

## 2020-01-05 PROCEDURE — 40000275 ZZH STATISTIC RCP TIME EA 10 MIN

## 2020-01-05 PROCEDURE — 00000146 ZZHCL STATISTIC GLUCOSE BY METER IP

## 2020-01-05 PROCEDURE — 80053 COMPREHEN METABOLIC PANEL: CPT | Performed by: INTERNAL MEDICINE

## 2020-01-05 PROCEDURE — 99291 CRITICAL CARE FIRST HOUR: CPT | Mod: 24 | Performed by: INTERNAL MEDICINE

## 2020-01-05 PROCEDURE — C9113 INJ PANTOPRAZOLE SODIUM, VIA: HCPCS | Performed by: PHYSICIAN ASSISTANT

## 2020-01-05 PROCEDURE — 25000132 ZZH RX MED GY IP 250 OP 250 PS 637: Mod: GY | Performed by: INTERNAL MEDICINE

## 2020-01-05 PROCEDURE — 36569 INSJ PICC 5 YR+ W/O IMAGING: CPT

## 2020-01-05 PROCEDURE — 25000131 ZZH RX MED GY IP 250 OP 636 PS 637: Mod: GY | Performed by: INTERNAL MEDICINE

## 2020-01-05 PROCEDURE — 94003 VENT MGMT INPAT SUBQ DAY: CPT

## 2020-01-05 PROCEDURE — 27210222 ZZH KIT SHRLOCK 6FR POWER PICC

## 2020-01-05 PROCEDURE — 25800030 ZZH RX IP 258 OP 636: Performed by: PHYSICIAN ASSISTANT

## 2020-01-05 PROCEDURE — 40000008 ZZH STATISTIC AIRWAY CARE

## 2020-01-05 PROCEDURE — 25000132 ZZH RX MED GY IP 250 OP 250 PS 637: Mod: GY | Performed by: PHYSICIAN ASSISTANT

## 2020-01-05 RX ADMIN — THIAMINE HYDROCHLORIDE 200 MG: 100 INJECTION, SOLUTION INTRAMUSCULAR; INTRAVENOUS at 10:10

## 2020-01-05 RX ADMIN — ASCORBIC ACID 1500 MG: 500 INJECTION, SOLUTION INTRAMUSCULAR; INTRAVENOUS; SUBCUTANEOUS at 03:49

## 2020-01-05 RX ADMIN — ENOXAPARIN SODIUM 40 MG: 40 INJECTION SUBCUTANEOUS at 15:48

## 2020-01-05 RX ADMIN — MEROPENEM 1 G: 1 INJECTION, POWDER, FOR SOLUTION INTRAVENOUS at 07:58

## 2020-01-05 RX ADMIN — QUETIAPINE 25 MG: 25 TABLET ORAL at 20:18

## 2020-01-05 RX ADMIN — POTASSIUM CHLORIDE 20 MEQ: 1.5 POWDER, FOR SOLUTION ORAL at 08:02

## 2020-01-05 RX ADMIN — Medication 150 MCG/HR: at 04:38

## 2020-01-05 RX ADMIN — SODIUM CHLORIDE 1.5 UNITS/HR: 9 INJECTION, SOLUTION INTRAVENOUS at 11:26

## 2020-01-05 RX ADMIN — CHLORHEXIDINE GLUCONATE 15 ML: 1.2 RINSE ORAL at 07:45

## 2020-01-05 RX ADMIN — ASCORBIC ACID 1500 MG: 500 INJECTION, SOLUTION INTRAMUSCULAR; INTRAVENOUS; SUBCUTANEOUS at 13:41

## 2020-01-05 RX ADMIN — SODIUM CHLORIDE 1.5 UNITS/HR: 9 INJECTION, SOLUTION INTRAVENOUS at 20:18

## 2020-01-05 RX ADMIN — DEXTROSE MONOHYDRATE: 50 INJECTION, SOLUTION INTRAVENOUS at 20:18

## 2020-01-05 RX ADMIN — MIDAZOLAM HYDROCHLORIDE 2 MG: 1 INJECTION, SOLUTION INTRAMUSCULAR; INTRAVENOUS at 03:22

## 2020-01-05 RX ADMIN — KETAMINE HYDROCHLORIDE 10 MG: 10 INJECTION INTRAMUSCULAR; INTRAVENOUS at 23:00

## 2020-01-05 RX ADMIN — SODIUM CHLORIDE 1.5 UNITS/HR: 9 INJECTION, SOLUTION INTRAVENOUS at 00:06

## 2020-01-05 RX ADMIN — Medication 150 MCG/HR: at 20:28

## 2020-01-05 RX ADMIN — MEROPENEM 1 G: 1 INJECTION, POWDER, FOR SOLUTION INTRAVENOUS at 15:44

## 2020-01-05 RX ADMIN — MIDAZOLAM HYDROCHLORIDE 2 MG: 1 INJECTION, SOLUTION INTRAMUSCULAR; INTRAVENOUS at 08:15

## 2020-01-05 RX ADMIN — QUETIAPINE 25 MG: 25 TABLET ORAL at 08:02

## 2020-01-05 RX ADMIN — MEROPENEM 1 G: 1 INJECTION, POWDER, FOR SOLUTION INTRAVENOUS at 23:04

## 2020-01-05 RX ADMIN — LEVOTHYROXINE SODIUM ANHYDROUS 50 MCG: 100 INJECTION, POWDER, LYOPHILIZED, FOR SOLUTION INTRAVENOUS at 15:05

## 2020-01-05 RX ADMIN — HYDROCORTISONE SODIUM SUCCINATE 50 MG: 100 INJECTION, POWDER, FOR SOLUTION INTRAMUSCULAR; INTRAVENOUS at 15:48

## 2020-01-05 RX ADMIN — DEXTROSE MONOHYDRATE: 50 INJECTION, SOLUTION INTRAVENOUS at 08:01

## 2020-01-05 RX ADMIN — HYDROCORTISONE SODIUM SUCCINATE 50 MG: 100 INJECTION, POWDER, FOR SOLUTION INTRAMUSCULAR; INTRAVENOUS at 23:01

## 2020-01-05 RX ADMIN — HYDROCORTISONE SODIUM SUCCINATE 50 MG: 100 INJECTION, POWDER, FOR SOLUTION INTRAMUSCULAR; INTRAVENOUS at 08:02

## 2020-01-05 RX ADMIN — ASCORBIC ACID 1500 MG: 500 INJECTION, SOLUTION INTRAMUSCULAR; INTRAVENOUS; SUBCUTANEOUS at 20:18

## 2020-01-05 RX ADMIN — PANTOPRAZOLE SODIUM 40 MG: 40 INJECTION, POWDER, FOR SOLUTION INTRAVENOUS at 08:10

## 2020-01-05 RX ADMIN — CHLORHEXIDINE GLUCONATE 15 ML: 1.2 RINSE ORAL at 20:38

## 2020-01-05 RX ADMIN — KETAMINE HYDROCHLORIDE 10 MG: 10 INJECTION INTRAMUSCULAR; INTRAVENOUS at 01:04

## 2020-01-05 RX ADMIN — POTASSIUM CHLORIDE 40 MEQ: 1.5 POWDER, FOR SOLUTION ORAL at 05:59

## 2020-01-05 RX ADMIN — ASCORBIC ACID 1500 MG: 500 INJECTION, SOLUTION INTRAMUSCULAR; INTRAVENOUS; SUBCUTANEOUS at 07:45

## 2020-01-05 ASSESSMENT — ACTIVITIES OF DAILY LIVING (ADL)
ADLS_ACUITY_SCORE: 29

## 2020-01-05 NOTE — PROGRESS NOTES
[facility] hospital ICU RESPIRATORY NOTE           Date of Admission: 12/29/2019     Date of Intubation (most recent):12/29/2019     Reason for Mechanical Ventilation:Respiratory failure     Number of Days on Mechanical Ventilation: 8     Met Criteria for Spontaneous Breathing Trial:No     Reason for No Spontaneous Breathing Trial:Per MD     Significant Events Today:None overnight     ABG Results:   Recent Labs   Lab 01/04/20  0422 01/03/20  0525 12/30/19  1303 12/30/19  0045   PH 7.37 7.40 7.37 7.44   PCO2 42 38 36 41   PO2 145* 103 97 120*   HCO3 24 23 21 28   O2PER 30  --   --  100     Ventilation Mode: CMV/AC  (Continuous Mandatory Ventilation/ Assist Control)  FiO2 (%): 30 %  Rate Set (breaths/minute): 16 breaths/min  Tidal Volume Set (mL): 500 mL  PEEP (cm H2O): 10 cmH2O  Oxygen Concentration (%): 30 %  Resp: 18    Will continue to follow.     Juan Francisco Marley, RT

## 2020-01-05 NOTE — PROGRESS NOTES
Critical Care Progress Note      01/05/2020    Name: Bhavani White MRN#: [id number]   Age: 64 year old YOB: 1955     Hsptl Day# 7  ICU DAY #5    MV DAY #5             Problem List:   Principal Problem:    Duodenal ulcer perforation (H)  Active Problems:    Elevated BMI    Perforated duodenal ulcer (H)    Acute respiratory failure with hypoxia (H)    NSTEMI (non-ST elevated myocardial infarction) (H)    Septic shock (H)           Summary/Hospital Course:     64 year old female with history of chronic pain on methadone, obesity, ANIYA, hypothyroidism, DM and prior abdominal surgery presented to ED this evening with altered mental status with abdominal pain and emesis discovered to have perforated gastric ulcer on imaging.      Per EMS notes patient not feeling well with 3 day prodrome not taking meds (methadone) with new altered mental status this evening prompting her  to call to 911.  Per EMS en route, patient vomited 'fecal material' with suspected aspiration. On arrival in ED, patient noted to be altered tachypneic, thrashing with cool, mottled skin and sats in 40% (?)  prompting immediate intubation for respiratory failure. Hemodynamics initially appropriate with MAP in 70s. EKG with some dynamic changes on initial tracing with new incomplete bundle branch block Labs notable for ED work notable for tropon ~20 , lactic acid 3 , ROLANDO 1.6 and acute hepatitis with LFTs in 700's.  WBC 20K, elevated Hgb 19. CT CAP demonstrated small effusions patchy GGO in RML consistent with aspiration, fat stranding around the pylorus and anterior duodenum suspicious for perforation as small amount of extraluminal gas.      TLC subsequently placed, zosyn started and 3L NS bolused. Surgery contacted in ED with recommendations for conservative management given unclear status of AMI, case also discussed with Cardiology       12/29 - admit > decompensated into shock  4 pressors requirement  12/30  - OR for  exploration no overt leak, noted gastric and duodenal ischemia and hemorrhage, blood evacuated and drain placed   12/31 - stabilized hemodynamics but remains on two pressors at similar doses , D5W for Hyper Na  1/1 - back to OR unable to repair per 2/2 size, drain placed for containment, bowel ischemia improved on visualization, no change in pressor requirements  1/2 - no major events, variable pressor requirements, UOP stable, trickle feeds started , blood culture from art line GPC  1/3- no major events. Blood cx growing staph hominis  1/4- jose removed, repeat bcx drawn            Interim History:      More awake this AM and following commands. PICC placed this AM and internal jugular CVC removed.       Assessment and plan :     Bhavani White IS a 64 year old female admitted on 12/29/2019 for abdominal sepsis 2/2 perforated duodenal ulcer, elevated troponins/NSTEMI, acute hypoxemic respiratory failure requiring intubation.      I have personally reviewed the daily labs, imaging studies, cultures and discussed the case with referring physician and consulting physicians.      My assessment and plan by system for this patient is as follows:     Neurology/Psychiatry:   1. Encephalopathy - metabolic, septic - stable     2. Pain/analgesia, chronic, on chronic methadone  3. Anxiety 2/2 acute illness  Plan  - continue fentanyl gtt, titrate to effect   - propofol gtt titrated off this afternoon 2/2 to bradycardia  - goal RASS -2 , daily sedation holiday as indicated  - continue seroquel  - continue prn versed     Cardiovascular:   1. Shock, mixed septic, cardiogenic - still pressor dependent, responsive to fluid initially, and improved with calcium supplementation -   2. NSTEMI /septic cardiomyopathy trop ~20 peak;  3. Rhythm - new bradycardia today, ? Sedation related. No ischemic changes on EKG today  Plan  - continue hemodynamic support, MAP >65 , wean pressors as able   - continue HAT treatment    - PRN fluid  challenge as indicated  - defer further cardiac eval given current condition and unable to anticoagulate (intra-abd bleed prior attempt)     Pulmonary/Ventilator Management:   1. Acute hypoxemic respiratory failure - 2/2 mixed shock - stable vent and O2 requirements  ? Suspect element of chronic hypoxemia given polycythemia - stable ventilatory requirements and oxygenation  2. Aspiration Pneumonia - noted RML GGO  Plan  - continue lung protective ventilation, keep increase PEEP 2/2 habitus and atelectaiss (10 cmH20)  - PST this afternoon given improving mental status   - antibiotics as below   - serial ABG and CXR as indicated     GI and Nutrition :   1. Perforated ulcer contained s/p ex lap with drainage   2. Bowel ischemia - in setting of shock and hemorrhage - improved on 2nd look and hemodynamic support  3. Shock liver  - multifactorial - sepsis, meds, fatty liver - stable on serial eval, imaging, improving   Plan  - surgery following - post op care and drain management   - continue supportive cares  - serial labs and eval as indicated  - Continue trickle TF     Renal/Fluids/Electrolytes:   1. ROLANDO- from sepsis Cr 2.5 peak - downtrending now, 1.1 this AM (baseline < 0.8).  Electrolytes within normal,  acid base stable   2. Hypernatremia - related to volume, D5 started, improved   3. Chronic cystitis - with existing suprapubic catheter  Plan  - monitor function and electrolytes;  as needed with replacement per ICU protocols.   - generally avoid nephrotoxic agents such as NSAID, IV contrast unless specifically required  - adjust medications as needed for renal clearance  - follow I/O's as appropriate.  - maintain hemodynamics, prn volume challenge as appropriate (match outputs as able)   - Continue D5W plus water flushes , goal       Infectious Disease:   1. Abdominal sepsis secondary to perforated duodenal ulcer; zosyn started > broadened to meropenem, fungal prophylaxis given containment issue x5  2. Staph  hominis bacteremia - 2/2 above, blood cultures 1/2 positive   3. Aspiration pneumonitis, high potential for aspiration PNA  4. Hx of polymicrobial UTI/cystitis vs colonization with suprapubic cath - Hx of MRSA   Plan  - cont meropenem, discontinue fungal prophylaxis (5 days)   - blood cx from 1/2 growing staph hominis again, needs line exchange. A line removed 1/4. Will be difficult to place new line in internal jugular or femoral given body habitus. PICC placed this AM.    -Recheck Bcx from internal jugular CVC 1/4  - continue HAT given continued pressor requirement     Endocrine:   1. H/o DMII  2. Stress induced hyperglycemia  3. Hx of iatrogenic cushings disease - previously on steroids  4. Hypothyroidism  Plan  - ICU insulin protocol, goal sugar <180, discontinue insulin gtt  - IV levothyroxine  - stress dose steroids, weaned to 50 q8  - continue D5W until Na 140      Hematology/Oncology:   1. Acute blood loss anemia - in setting of perf ulcer/AC, s/p drainage, Hgb 19 on admission,    2. Polycythemia- may be suggestive of dehydration, but patient with hematocrit of > 17 on previous admission.    3. Thrombocytopenia - sepsis related, stable   Plan  - monitor daily CBC     IV/Access:   1. Venous access - PICC placed 1/5   2. Arterial access - radial arterial line pulled today  Plan  - central access to be established if patient is requiring vasopressors     ICU Prophylaxis:   1. DVT: lovenox (ok with surgery)  2. VAP: HOB 30 degrees, chlorhexidine rinse  3. Stress Ulcer: PPI  4. Restraints: Nonviolent soft two point restraints required and necessary for patient safety and continued cares and good effect as patient continues to pull at necessary lines, tubes despite education and distraction. Will readdress daily.   5. Wound care -per surgery and unit routine   6. Feeding - NPO for medical reasons. NGT to suction.    7. Family Update: no  8. Disposition - ICU.  Prognosis guarded.      Billing: This patient is  critically ill: Yes. Total critical care time today 45 min excluding procedures.    Steven Velasco MD  Pulmonary & Critical Care Medicine  [facility]  Pager: [phone]    Key goals for next 24 hours:   1. PICC this AM   2. PST this PM   3. Continue mechanical ventilation  4. Continue supportive cares  5. Continue free water           Key Medications:       ascorbic acid intermittent infusion  1,500 mg Intravenous Q6H     chlorhexidine  15 mL Mouth/Throat Q12H     enoxaparin ANTICOAGULANT  40 mg Subcutaneous Q24H     hydrocortisone sodium succinate PF  50 mg Intravenous Q8H     levothyroxine  50 mcg Intravenous Daily     meropenem  1 g Intravenous Q8H     pantoprazole (PROTONIX) IV  40 mg Intravenous Daily     QUEtiapine  25 mg Oral BID     sodium chloride (PF)  10 mL Intracatheter Q8H     sodium chloride (PF)  3 mL Intracatheter Q8H     thiamine  200 mg Intravenous Daily       dextrose       D5W 110 mL/hr at 01/05/20 0801     fentaNYL 150 mcg/hr (01/05/20 0800)     insulin (regular) 1.5 Units/hr (01/05/20 1126)     - MEDICATION INSTRUCTIONS -       norepinephrine 0.03 mcg/kg/min (01/05/20 1130)     - MEDICATION INSTRUCTIONS -       propofol (DIPRIVAN) infusion Stopped (01/04/20 1430)     ACE/ARB/ARNI NOT PRESCRIBED       BETA BLOCKER NOT PRESCRIBED       STATIN NOT PRESCRIBED       vasopressin (PITRESSIN) infusion ADULT (40 mL) Stopped (01/04/20 2013)               Physical Examination:   Temp:  [96.3  F (35.7  C)-98.9  F (37.2  C)] 97.7  F (36.5  C)  Pulse:  [] 58  Heart Rate:  [45-97] 59  Resp:  [16-19] 16  BP: ()/(43-91) 80/49  MAP:  [55 mmHg-71 mmHg] 71 mmHg  Arterial Line BP: ()/(43-53) 113/53  FiO2 (%):  [30 %] 30 %  SpO2:  [92 %-100 %] 99 %      Intake/Output Summary (Last 24 hours) at 1/3/2020 1317  Last data filed at 1/3/2020 1300  Gross per 24 hour   Intake 3631.76 ml   Output 2040 ml   Net 1591.76 ml         Wt Readings from Last 4 Encounters:   01/05/20 135 kg (297 lb  9.9 oz)   11/01/19 113.4 kg (250 lb)   10/28/19 121.7 kg (268 lb 3.2 oz)   08/29/19 117.5 kg (259 lb)     Arterial Line BP: ()/(43-53) 113/53  MAP:  [55 mmHg-71 mmHg] 71 mmHg  BP - Mean:  [] 56  Ventilation Mode: CMV/AC  (Continuous Mandatory Ventilation/ Assist Control)  FiO2 (%): 30 %  Rate Set (breaths/minute): 16 breaths/min  Tidal Volume Set (mL): 500 mL  PEEP (cm H2O): 10 cmH2O  Oxygen Concentration (%): 30 %  Resp: 16    Recent Labs   Lab 01/04/20  0422 01/03/20  0525 12/30/19  1303 12/30/19  0045   PH 7.37 7.40 7.37 7.44   PCO2 42 38 36 41   PO2 145* 103 97 120*   HCO3 24 23 21 28   O2PER 30  --   --  100       GEN: intubated, more awake and following commands    HEENT: head ncat, sclera anicteric, OP patent, trachea midline   PULM: unlabored synchronous with vent, clear anteriorly diminished at bases and laterally   CV/COR: tachycardic irregular S1S2 no gallop,  No rub, no murmur  ABD:  Obese soft  Mild tenderness  Midline tube c/d/i hypoactive bowel sounds, no mass noted hernia  EXT: warm, no edema   NEURO: limited by sedation    SKIN: no obvious rash  LINES: clean, dry intact         Data:   All data and imaging reviewed     ROUTINE ICU LABS (Last four results)  CMP  Recent Labs   Lab 01/05/20  0510 01/04/20  0416 01/03/20  0402 01/02/20  0416  12/31/19  0400  12/30/19  0420 12/29/19  2130    143 145* 148*   < > 146*   < >  --  146*   POTASSIUM 3.3* 3.8 4.3 3.9   < > 3.7   < >  --  3.9   CHLORIDE 110* 111* 115* 118*   < > 115*   < >  --  109   CO2 28 25 24 22   < > 19*   < >  --  28   ANIONGAP 5 7 6 8   < > 12   < >  --  9   * 179* 124* 133*   < > 285*   < >  --  189*   BUN 42* 48* 50* 59*   < > 86*   < >  --  60*   CR 0.94 1.12* 1.18* 1.47*   < > 2.50*   < >  --  1.40*   GFRESTIMATED 65 52* 49* 38*   < > 20*   < >  --  40*   GFRESTBLACK 75 60* 57* 44*   < > 23*   < >  --  46*   DORY 8.0* 7.3* 6.8* 6.5*   < > 6.3*   < >  --  8.6   MAG  --   --   --  2.2  --  1.8  --  2.2 2.1   PHOS   --   --   --   --   --   --   --   --  2.4*   PROTTOTAL 4.4* 4.7* 4.6* 4.8*   < > 4.8*   < >  --  5.8*   ALBUMIN 1.9* 2.2* 2.1* 2.3*   < > 2.7*   < >  --  2.5*   BILITOTAL 1.1 1.0 1.1 0.9   < > 2.5*   < >  --  1.0   ALKPHOS 73 72 73 89   < > 74   < >  --  97   AST 80* 141* 281* 391*   < > 977*   < >  --  567*   * 444* 669* 842*   < > 1,263*   < >  --  738*    < > = values in this interval not displayed.     CBC  Recent Labs   Lab 01/05/20  0510 01/04/20  0416 01/03/20  0402 01/02/20  0416   WBC 20.8* 25.3* 27.7* 26.1*   RBC 3.51* 3.53* 3.85 4.19   HGB 9.7* 10.0* 10.7* 11.8   HCT 30.8* 31.0* 33.3* 36.0   MCV 88 88 87 86   MCH 27.6 28.3 27.8 28.2   MCHC 31.5 32.3 32.1 32.8   RDW 17.4* 17.3* 17.1* 17.1*    148* 119* 146*     INR  Recent Labs   Lab 12/29/19  1535 12/29/19  1436   INR 1.61* Canceled, Test credited     Arterial Blood Gas  Recent Labs   Lab 01/04/20  0422 01/03/20  0525 12/30/19  1303 12/30/19  0045   PH 7.37 7.40 7.37 7.44   PCO2 42 38 36 41   PO2 145* 103 97 120*   HCO3 24 23 21 28   O2PER 30  --   --  100       All cultures:  Recent Labs   Lab 01/04/20  1455 01/02/20  1233 12/30/19  0425 12/29/19  1507 12/29/19  1435   CULT No growth after 12 hours Cultured on the 1st day of incubation:  Staphylococcus hominis  *  Critical Value/Significant Value, preliminary result only, called to and read back by  [facility] @6191 AEM    Susceptibility testing done on previous specimen Methicillin resistant Staphylococcus aureus (MRSA) isolated* No growth Cultured on the 1st day of incubation:  Staphylococcus hominis  *  Critical Value/Significant Value, preliminary result only, called to and read back by  Suhail Escobar RN from [facility] ICU. 12/30/19 at 1202.TV.    Cultured on the 1st day of incubation:  Staphylococcus epidermidis  *  (Note)  POSITIVE for Staphylococci other than S.aureus, S.epidermidis and  S.lugdunensis, by [employer] nucleic acid  test.  Coagulase-negative staphylococci are the most common venipuncture or  collection associated skin CONTAMINANTS grown in blood cultures.  Final identification and antimicrobial susceptibility testing will be  verified by standard methods.    Specimen tested with AGRIMAPS multiplex, gram-positive blood culture  nucleic acid test for the following targets: Staph aureus, Staph  epidermidis, Staph lugdunensis, other Staph species, Enterococcus  faecalis, Enterococcus faecium, Streptococcus species, S. agalactiae,  S. anginosus grp., S. pneumoniae, S. pyogenes, Listeria sp., mecA  (methicillin resistance) and Nick/B (vancomycin resistance).    Critical Value/Significant Value called to and read back by FANNY SAMANO RN 12/30/2019 @ 2154 BC       No results found for this or any previous visit (from the past 24 hour(s)).

## 2020-01-05 NOTE — PROVIDER NOTIFICATION
MD Notification    Notified Person: MD    Notified Person Name:  Dr. Jackson    Notification Date/Time:  01/04/2020 2010    Notification Interaction:  Face to face    Purpose of Notification:  BP elevated on pressors, pt indicating increasing pain while maxed on fentanyl     Orders Received:  Stop vasopressin, see prn order for ketamine.     Comments:

## 2020-01-05 NOTE — PROGRESS NOTES
[facility] ICU RESPIRATORY NOTE        Date of Admission: 12/29/2019    Date of Intubation (most recent): 12/29/19    Reason for Mechanical Ventilation: Respiratory failure    Number of Days on Mechanical Ventilation: 8    Met Criteria for Spontaneous Breathing Trial: Yes -     Significant Events Today: Pt did do SBT for 30 minutes today.     ABG Results:   Recent Labs   Lab 01/04/20  0422 01/03/20  0525 12/30/19  1303 12/30/19  0045   PH 7.37 7.40 7.37 7.44   PCO2 42 38 36 41   PO2 145* 103 97 120*   HCO3 24 23 21 28   O2PER 30  --   --  100       Current Vent Settings: Ventilation Mode: CMV/AC  (Continuous Mandatory Ventilation/ Assist Control)  FiO2 (%): 30 %  Rate Set (breaths/minute): 16 breaths/min  Tidal Volume Set (mL): 500 mL  PEEP (cm H2O): 10 cmH2O  Pressure Support (cm H2O): 12 cmH2O  Oxygen Concentration (%): 30 %  Resp: 16        Plan: Will continue full ventilatory support for now and assess for weaning readiness daily.               Anay Mcarthur, RT

## 2020-01-05 NOTE — PROGRESS NOTES
ICU BRIEF    Patient complaining of pain - added ketamine 10 q4, increased to 10 q 2.Continued fentanyl gtt.     Hernan Jackson MD

## 2020-01-05 NOTE — PLAN OF CARE
Pt remains on full vent support. Propofol gtt d/c'd due to bradycardia. Fentanyl gtt for pain control. Versed PRN. Pt opens eyes but doesn't track or follow. R THAI output moderate; L THAI drainage thick. (+) Blood cultures. A line d/c'd. PICC ordered. Family at bedside - updated on plan of care. Cont to monitor.

## 2020-01-05 NOTE — PLAN OF CARE
Pt alert, off sedation, will occasionally follow commands. Can nod head to yes/no questions. Complained of increasing pain during shift even with fentanyl at max rate. Prn ketamine works well, as well as versed for anxiety. BP started to become hypertensive at beginning of shift, levophed off at 1948 and vasopressin off at 2013, MAP has been > 65 the rest of the shift with no need to restart. Pt had a self-limiting episode of A-fib RVR in 140s, MD notified and 12 lead EKG ordered. By the time the test was to be done pt was back in SR 70s, test was not completed. Insulin gtt remains at 1.5-3 units/hr. Plan to pressure support during AM shift per MD. Abdominal drain dressings changed prn.  called and updated on pt condition.

## 2020-01-05 NOTE — PROGRESS NOTES
[employer] note    BM +, advance tube feeds  More interactive, decreasing pressor needs  abd soft - ashley/tubes with output as expected  Continue current care

## 2020-01-05 NOTE — PROVIDER NOTIFICATION
MD Notification    Notified Person: MD    Notified Person Name:  Dr. Jackson    Notification Date/Time:  01/04/2020  2997     Notification Interaction:  Face to face    Purpose of Notification:  Pt continues to complain of pain    Orders Received:  Change prn ketamine to Q2     Comments:

## 2020-01-05 NOTE — PROGRESS NOTES
Spoke with nursing staff about obtaining consent for PICC line.   is first line in consenting.  Per chart, if  doesn't answer phone, call Dominique White next.  Called both home and cell numbers of .  No answer, unable to leave message on land line.  Called Dominique, no answer; also unable to leave messages-mail box full.

## 2020-01-06 LAB
ANION GAP SERPL CALCULATED.3IONS-SCNC: 6 MMOL/L (ref 3–14)
BUN SERPL-MCNC: 36 MG/DL (ref 7–30)
CALCIUM SERPL-MCNC: 8.2 MG/DL (ref 8.5–10.1)
CHLORIDE SERPL-SCNC: 110 MMOL/L (ref 94–109)
CO2 SERPL-SCNC: 27 MMOL/L (ref 20–32)
CREAT SERPL-MCNC: 0.95 MG/DL (ref 0.52–1.04)
ERYTHROCYTE [DISTWIDTH] IN BLOOD BY AUTOMATED COUNT: 17.9 % (ref 10–15)
GFR SERPL CREATININE-BSD FRML MDRD: 64 ML/MIN/{1.73_M2}
GLUCOSE BLDC GLUCOMTR-MCNC: 106 MG/DL (ref 70–99)
GLUCOSE BLDC GLUCOMTR-MCNC: 107 MG/DL (ref 70–99)
GLUCOSE BLDC GLUCOMTR-MCNC: 110 MG/DL (ref 70–99)
GLUCOSE BLDC GLUCOMTR-MCNC: 111 MG/DL (ref 70–99)
GLUCOSE BLDC GLUCOMTR-MCNC: 111 MG/DL (ref 70–99)
GLUCOSE BLDC GLUCOMTR-MCNC: 115 MG/DL (ref 70–99)
GLUCOSE BLDC GLUCOMTR-MCNC: 125 MG/DL (ref 70–99)
GLUCOSE BLDC GLUCOMTR-MCNC: 126 MG/DL (ref 70–99)
GLUCOSE BLDC GLUCOMTR-MCNC: 127 MG/DL (ref 70–99)
GLUCOSE BLDC GLUCOMTR-MCNC: 127 MG/DL (ref 70–99)
GLUCOSE BLDC GLUCOMTR-MCNC: 129 MG/DL (ref 70–99)
GLUCOSE BLDC GLUCOMTR-MCNC: 139 MG/DL (ref 70–99)
GLUCOSE BLDC GLUCOMTR-MCNC: 90 MG/DL (ref 70–99)
GLUCOSE SERPL-MCNC: 137 MG/DL (ref 70–99)
HCT VFR BLD AUTO: 32.1 % (ref 35–47)
HGB BLD-MCNC: 10.5 G/DL (ref 11.7–15.7)
MAGNESIUM SERPL-MCNC: 1.9 MG/DL (ref 1.6–2.3)
MCH RBC QN AUTO: 28.8 PG (ref 26.5–33)
MCHC RBC AUTO-ENTMCNC: 32.7 G/DL (ref 31.5–36.5)
MCV RBC AUTO: 88 FL (ref 78–100)
PHOSPHATE SERPL-MCNC: 2 MG/DL (ref 2.5–4.5)
PLATELET # BLD AUTO: 242 10E9/L (ref 150–450)
POTASSIUM SERPL-SCNC: 3.8 MMOL/L (ref 3.4–5.3)
PROCALCITONIN SERPL-MCNC: 1.7 NG/ML
RBC # BLD AUTO: 3.65 10E12/L (ref 3.8–5.2)
SODIUM SERPL-SCNC: 143 MMOL/L (ref 133–144)
WBC # BLD AUTO: 22.7 10E9/L (ref 4–11)

## 2020-01-06 PROCEDURE — 25800030 ZZH RX IP 258 OP 636: Performed by: INTERNAL MEDICINE

## 2020-01-06 PROCEDURE — 25000125 ZZHC RX 250: Performed by: PHYSICIAN ASSISTANT

## 2020-01-06 PROCEDURE — 40000275 ZZH STATISTIC RCP TIME EA 10 MIN

## 2020-01-06 PROCEDURE — 93005 ELECTROCARDIOGRAM TRACING: CPT

## 2020-01-06 PROCEDURE — 27210437 ZZH NUTRITION PRODUCT SEMIELEM INTERMED LITER

## 2020-01-06 PROCEDURE — 25000128 H RX IP 250 OP 636: Performed by: INTERNAL MEDICINE

## 2020-01-06 PROCEDURE — 20000003 ZZH R&B ICU

## 2020-01-06 PROCEDURE — 99233 SBSQ HOSP IP/OBS HIGH 50: CPT | Performed by: PHYSICIAN ASSISTANT

## 2020-01-06 PROCEDURE — 83735 ASSAY OF MAGNESIUM: CPT | Performed by: INTERNAL MEDICINE

## 2020-01-06 PROCEDURE — 85027 COMPLETE CBC AUTOMATED: CPT | Performed by: INTERNAL MEDICINE

## 2020-01-06 PROCEDURE — 25000132 ZZH RX MED GY IP 250 OP 250 PS 637: Mod: GY | Performed by: INTERNAL MEDICINE

## 2020-01-06 PROCEDURE — 40000239 ZZH STATISTIC VAT ROUNDS

## 2020-01-06 PROCEDURE — 80048 BASIC METABOLIC PNL TOTAL CA: CPT | Performed by: INTERNAL MEDICINE

## 2020-01-06 PROCEDURE — 25000128 H RX IP 250 OP 636: Performed by: PHYSICIAN ASSISTANT

## 2020-01-06 PROCEDURE — 93010 ELECTROCARDIOGRAM REPORT: CPT | Performed by: INTERNAL MEDICINE

## 2020-01-06 PROCEDURE — 84145 PROCALCITONIN (PCT): CPT | Performed by: INTERNAL MEDICINE

## 2020-01-06 PROCEDURE — 25000125 ZZHC RX 250: Performed by: INTERNAL MEDICINE

## 2020-01-06 PROCEDURE — 99291 CRITICAL CARE FIRST HOUR: CPT | Mod: 24 | Performed by: INTERNAL MEDICINE

## 2020-01-06 PROCEDURE — 00000146 ZZHCL STATISTIC GLUCOSE BY METER IP

## 2020-01-06 PROCEDURE — 25000131 ZZH RX MED GY IP 250 OP 636 PS 637: Mod: GY | Performed by: INTERNAL MEDICINE

## 2020-01-06 PROCEDURE — 25000132 ZZH RX MED GY IP 250 OP 250 PS 637: Mod: GY | Performed by: PHYSICIAN ASSISTANT

## 2020-01-06 PROCEDURE — 36415 COLL VENOUS BLD VENIPUNCTURE: CPT | Performed by: INTERNAL MEDICINE

## 2020-01-06 PROCEDURE — C9113 INJ PANTOPRAZOLE SODIUM, VIA: HCPCS | Performed by: PHYSICIAN ASSISTANT

## 2020-01-06 PROCEDURE — 94003 VENT MGMT INPAT SUBQ DAY: CPT

## 2020-01-06 PROCEDURE — 84100 ASSAY OF PHOSPHORUS: CPT | Performed by: INTERNAL MEDICINE

## 2020-01-06 PROCEDURE — 25800030 ZZH RX IP 258 OP 636: Performed by: PHYSICIAN ASSISTANT

## 2020-01-06 RX ORDER — LEVOTHYROXINE SODIUM 88 UG/1
88 TABLET ORAL
Status: DISCONTINUED | OUTPATIENT
Start: 2020-01-06 | End: 2020-01-06

## 2020-01-06 RX ORDER — METHADONE HYDROCHLORIDE 10 MG/1
10 TABLET ORAL EVERY 6 HOURS SCHEDULED
Status: DISCONTINUED | OUTPATIENT
Start: 2020-01-06 | End: 2020-01-06

## 2020-01-06 RX ORDER — METHADONE HYDROCHLORIDE 10 MG/1
10 TABLET ORAL EVERY 6 HOURS SCHEDULED
Status: DISCONTINUED | OUTPATIENT
Start: 2020-01-06 | End: 2020-01-07

## 2020-01-06 RX ORDER — PIPERACILLIN SODIUM, TAZOBACTAM SODIUM 4; .5 G/20ML; G/20ML
4.5 INJECTION, POWDER, LYOPHILIZED, FOR SOLUTION INTRAVENOUS EVERY 6 HOURS
Status: DISCONTINUED | OUTPATIENT
Start: 2020-01-06 | End: 2020-01-12

## 2020-01-06 RX ORDER — FUROSEMIDE 10 MG/ML
20 INJECTION INTRAMUSCULAR; INTRAVENOUS ONCE
Status: COMPLETED | OUTPATIENT
Start: 2020-01-06 | End: 2020-01-06

## 2020-01-06 RX ORDER — LEVALBUTEROL 1.25 MG/.5ML
1.25 SOLUTION, CONCENTRATE RESPIRATORY (INHALATION) EVERY 8 HOURS PRN
Status: DISCONTINUED | OUTPATIENT
Start: 2020-01-06 | End: 2020-01-21 | Stop reason: HOSPADM

## 2020-01-06 RX ORDER — HYDRALAZINE HYDROCHLORIDE 20 MG/ML
10 INJECTION INTRAMUSCULAR; INTRAVENOUS EVERY 4 HOURS PRN
Status: DISCONTINUED | OUTPATIENT
Start: 2020-01-06 | End: 2020-01-21 | Stop reason: HOSPADM

## 2020-01-06 RX ORDER — QUETIAPINE FUMARATE 25 MG/1
25 TABLET, FILM COATED ORAL 2 TIMES DAILY
Status: DISCONTINUED | OUTPATIENT
Start: 2020-01-06 | End: 2020-01-08

## 2020-01-06 RX ORDER — METHADONE HYDROCHLORIDE 10 MG/ML
10 CONCENTRATE ORAL EVERY 6 HOURS
Status: DISCONTINUED | OUTPATIENT
Start: 2020-01-06 | End: 2020-01-06

## 2020-01-06 RX ORDER — FENTANYL 75 UG/1
75 PATCH TRANSDERMAL
Status: DISCONTINUED | OUTPATIENT
Start: 2020-01-06 | End: 2020-01-06

## 2020-01-06 RX ORDER — LEVOTHYROXINE SODIUM 88 UG/1
88 TABLET ORAL
Status: DISCONTINUED | OUTPATIENT
Start: 2020-01-07 | End: 2020-01-21 | Stop reason: HOSPADM

## 2020-01-06 RX ORDER — GABAPENTIN 250 MG/5ML
800 SOLUTION ORAL 3 TIMES DAILY
Status: DISCONTINUED | OUTPATIENT
Start: 2020-01-06 | End: 2020-01-18

## 2020-01-06 RX ADMIN — DEXTROSE MONOHYDRATE: 50 INJECTION, SOLUTION INTRAVENOUS at 07:38

## 2020-01-06 RX ADMIN — PIPERACILLIN AND TAZOBACTAM 4.5 G: 4; .5 INJECTION, POWDER, FOR SOLUTION INTRAVENOUS at 20:33

## 2020-01-06 RX ADMIN — THIAMINE HYDROCHLORIDE 200 MG: 100 INJECTION, SOLUTION INTRAMUSCULAR; INTRAVENOUS at 08:39

## 2020-01-06 RX ADMIN — QUETIAPINE 25 MG: 25 TABLET ORAL at 08:39

## 2020-01-06 RX ADMIN — ASCORBIC ACID 1500 MG: 500 INJECTION, SOLUTION INTRAMUSCULAR; INTRAVENOUS; SUBCUTANEOUS at 08:21

## 2020-01-06 RX ADMIN — HYDROCORTISONE SODIUM SUCCINATE 50 MG: 100 INJECTION, POWDER, FOR SOLUTION INTRAMUSCULAR; INTRAVENOUS at 07:38

## 2020-01-06 RX ADMIN — METHADONE HYDROCHLORIDE 10 MG: 10 TABLET ORAL at 18:12

## 2020-01-06 RX ADMIN — PANTOPRAZOLE SODIUM 40 MG: 40 INJECTION, POWDER, FOR SOLUTION INTRAVENOUS at 08:39

## 2020-01-06 RX ADMIN — ASCORBIC ACID 1500 MG: 500 INJECTION, SOLUTION INTRAMUSCULAR; INTRAVENOUS; SUBCUTANEOUS at 02:11

## 2020-01-06 RX ADMIN — GABAPENTIN 800 MG: 250 SOLUTION ORAL at 21:37

## 2020-01-06 RX ADMIN — FUROSEMIDE 20 MG: 10 INJECTION, SOLUTION INTRAMUSCULAR; INTRAVENOUS at 08:39

## 2020-01-06 RX ADMIN — LEVOTHYROXINE SODIUM 88 MCG: 88 TABLET ORAL at 13:38

## 2020-01-06 RX ADMIN — MAGNESIUM SULFATE HEPTAHYDRATE 2 G: 40 INJECTION, SOLUTION INTRAVENOUS at 10:08

## 2020-01-06 RX ADMIN — SODIUM CHLORIDE 3 UNITS/HR: 9 INJECTION, SOLUTION INTRAVENOUS at 08:19

## 2020-01-06 RX ADMIN — SODIUM CHLORIDE 1 UNITS/HR: 9 INJECTION, SOLUTION INTRAVENOUS at 20:42

## 2020-01-06 RX ADMIN — KETAMINE HYDROCHLORIDE 10 MG: 10 INJECTION INTRAMUSCULAR; INTRAVENOUS at 02:40

## 2020-01-06 RX ADMIN — MEROPENEM 1 G: 1 INJECTION, POWDER, FOR SOLUTION INTRAVENOUS at 07:38

## 2020-01-06 RX ADMIN — METHADONE HYDROCHLORIDE 10 MG: 10 TABLET ORAL at 15:01

## 2020-01-06 RX ADMIN — METHADONE HYDROCHLORIDE 10 MG: 10 TABLET ORAL at 23:36

## 2020-01-06 RX ADMIN — POTASSIUM PHOSPHATE, MONOBASIC AND POTASSIUM PHOSPHATE, DIBASIC 15 MMOL: 224; 236 INJECTION, SOLUTION INTRAVENOUS at 12:29

## 2020-01-06 RX ADMIN — Medication 50 MCG/HR: at 14:35

## 2020-01-06 RX ADMIN — PIPERACILLIN AND TAZOBACTAM 4.5 G: 4; .5 INJECTION, POWDER, FOR SOLUTION INTRAVENOUS at 13:39

## 2020-01-06 RX ADMIN — CHLORHEXIDINE GLUCONATE 15 ML: 1.2 RINSE ORAL at 07:43

## 2020-01-06 RX ADMIN — QUETIAPINE 25 MG: 25 TABLET ORAL at 20:34

## 2020-01-06 RX ADMIN — CHLORHEXIDINE GLUCONATE 15 ML: 1.2 RINSE ORAL at 19:59

## 2020-01-06 RX ADMIN — GABAPENTIN 800 MG: 250 SOLUTION ORAL at 16:20

## 2020-01-06 RX ADMIN — ENOXAPARIN SODIUM 40 MG: 40 INJECTION SUBCUTANEOUS at 16:20

## 2020-01-06 ASSESSMENT — ACTIVITIES OF DAILY LIVING (ADL)
ADLS_ACUITY_SCORE: 29

## 2020-01-06 NOTE — PLAN OF CARE
Pt remains on full vent support. Fentanyl gtt for pain and versed prn anxiety. Pt tolerated CPAP 12/10 for 40 minutes. Pt opens eyes, tracks and follows commands. PICC placed this am and TLC removed. Pt remains off pressors. Family updated by phone. Cont to monitor.

## 2020-01-06 NOTE — PHARMACY
Prescriber Notification Note - pain regimen    The pharmacist has communicated with this patient's provider regarding a concern or therapy recommendation.    Notified Person: Dr Jackson  Date/Time of Notification: 1/6/20 at 1315  Interaction: Face to face  Concern/Recommendation: Asked to evaluate patient for pain regimen, currently is on 100mcg/h fentanyl. I accessed  today for patient, looks like she was getting methadone 50mg daily, recently increased from 40mg on 10/8/19 (taking it now 20mg in AM, 10mg in afternoon, 20mg in evening).  She was also getting 1600mg TID of gabapentin. And a random fill of some ketamine on 11/6/19, compounded by [facility]. Discussed this with provider and recommended to continue on home regimen of methadone and consider adding back gabapentin at some capacity as she is on a pretty hefty dose and withdrawal is a concern - it is a drug that should be tapered off especially at this dose.  I spoke with the pain specialist at the  and she agreed that re-starting at half the dose would be a good idea with hold parameters for sedation. If we want to take her off we should taper. To note her renal function was poor when she came in but has since normalized.     Comments/Additional Details: MD opted to resume methadone at lower dose of 40mg and declined adding back gabapentin at this time, stating she has been without it for a week.  We will wean off the fentanyl victor manuel Peterson, PharmD

## 2020-01-06 NOTE — PROGRESS NOTES
Critical Care Progress Note      01/06/2020    Name: Bhavani White MRN#: [id number]   Age: 64 year old YOB: 1955     Hsptl Day# 8  ICU DAY #6    MV DAY #6             Problem List:   Principal Problem:    Duodenal ulcer perforation (H)  Active Problems:    Elevated BMI    Perforated duodenal ulcer (H)    Acute respiratory failure with hypoxia (H)    NSTEMI (non-ST elevated myocardial infarction) (H)    Septic shock (H)           Summary/Hospital Course:     64 year old female with history of chronic pain on methadone, obesity, ANIYA, hypothyroidism, DM and prior abdominal surgery presented to ED this evening with altered mental status with abdominal pain and emesis discovered to have perforated gastric ulcer on imaging.      Per EMS notes patient not feeling well with 3 day prodrome not taking meds (methadone) with new altered mental status this evening prompting her  to call to 911.  Per EMS en route, patient vomited 'fecal material' with suspected aspiration. On arrival in ED, patient noted to be altered tachypneic, thrashing with cool, mottled skin and sats in 40% (?)  prompting immediate intubation for respiratory failure. Hemodynamics initially appropriate with MAP in 70s. EKG with some dynamic changes on initial tracing with new incomplete bundle branch block Labs notable for ED work notable for tropon ~20 , lactic acid 3 , ROLANDO 1.6 and acute hepatitis with LFTs in 700's.  WBC 20K, elevated Hgb 19. CT CAP demonstrated small effusions patchy GGO in RML consistent with aspiration, fat stranding around the pylorus and anterior duodenum suspicious for perforation as small amount of extraluminal gas.      TLC subsequently placed, zosyn started and 3L NS bolused. Surgery contacted in ED with recommendations for conservative management given unclear status of AMI, case also discussed with Cardiology       12/29 - admit > decompensated into shock  4 pressors requirement  12/30  - OR for  exploration no overt leak, noted gastric and duodenal ischemia and hemorrhage, blood evacuated and drain placed   12/31 - stabilized hemodynamics but remains on two pressors at similar doses , D5W for Hyper Na  1/1 - back to OR unable to repair per 2/2 size, drain placed for containment, bowel ischemia improved on visualization, no change in pressor requirements  1/2 - no major events, variable pressor requirements, UOP stable, trickle feeds started , blood culture from art line GPC  1/3- no major events. Blood cx growing staph hominis  1/4- jose removed, repeat bcx drawn   1/5 - PS trials           Interim History:      - improved PS trials this AM, able to be extubated  - TLC removed and PICC placed given bacteremia (1/2)        Assessment and plan :     Bhavani White IS a 64 year old female admitted on 12/29/2019 for abdominal sepsis 2/2 perforated duodenal ulcer, elevated troponins/NSTEMI, acute hypoxemic respiratory failure requiring intubation.      I have personally reviewed the daily labs, imaging studies, cultures and discussed the case with referring physician and consulting physicians.      My assessment and plan by system for this patient is as follows:     Neurology/Psychiatry:   1. Encephalopathy - metabolic, septic - improved      2. Pain/analgesia, chronic, on chronic methadone, currently treated with fentanyl gtt  3. Anxiety 2/2 acute illness  Plan  - wean off fentanyl gtt transition back to methadone 40 mg (home dose 50 mg  -continue prn pushes fentanyl as needed  - continue seroquel  - continue prn versed     Cardiovascular:   1. Shock, mixed septic, cardiogenic - resolved   2. NSTEMI /septic cardiomyopathy trop ~20 peak;  3. Rhythm - intermittent bradycardia, ? Sedation related. No ischemic changes on serial EKG   Plan  -follow hemodynamic , MAP >65 ,  - discontinue HAT treatment    - PRN fluid challenge as indicated  - deferring further cardiac eval given current condition and unable to  anticoagulate (intra-abd bleed prior attempt)  - EKG  To follow QTC     Pulmonary/Ventilator Management:   1. Acute hypoxemic respiratory failure - 2/2 mixed shock - stable vent and O2 requirements - extubated this AM  ? Suspect element of chronic hypoxemia given polycythemia - stable ventilatory requirements and oxygenation  2. Aspiration Pneumonia - noted RML GGO  3. Extraprenchymal restrictive lung disease - 2/2 habitus and surgery   Plan  - Nocturnal BiPAP 12/6 , supplemental O2  - incentive spirometry, pulmonary toilet  - antibiotics as below   - serial ABG and CXR as indicated     GI and Nutrition :   1. Perforated ulcer contained s/p ex lap with tube drainage   2. Bowel ischemia - in setting of shock and hemorrhage - stabilized (seen on initial surgery, improved on 2nd look)  3. Shock liver  - multifactorial - sepsis, meds, fatty liver - stable on serial eval, imaging, improving   3. GI hemorrhage - on admission in setting of AC, resolved  Plan  - surgery following - post op care and drain management   - continue supportive cares  - serial labs and eval as indicated  - continue trickle TF, advance as tolerated     Renal/Fluids/Electrolytes:   1. ROLANDO- from sepsis Cr 2.5 peak - resolved 0.95(baseline < 0.8).  Electrolytes within normal,  acid base stable   2. Hypernatremia - related to volume, D5 started, improved   3. Chronic cystitis - with existing suprapubic catheter  Plan  - monitor function and electrolytes;  as needed with replacement per ICU protocols.   - generally avoid nephrotoxic agents such as NSAID, IV contrast unless specifically required  - adjust medications as needed for renal clearance  - follow I/O's as appropriate.  - maintain hemodynamics, prn volume challenge as appropriate (match outputs as able)   - discontinue D5W   - continue water flushes      Infectious Disease:   1. Abdominal sepsis secondary to perforated duodenal ulcer; zosyn started > broadened to meropenem, fungal prophylaxis  given containment issue x5  2. Staph hominis bacteremia - 2/2 above, blood cultures 1/2 positive , repeat 1/4 NGTD s/p line exchange 1/5 (TLC removed and PICC placed)  3. Aspiration pneumonitis > PNA  4. Hx of polymicrobial UTI/cystitis vs colonization with suprapubic cath - Hx of MRSA   Plan  - discontinue meropenem > change to zosyn , check procal today with change   - completed fungal prophylaxis (5 days)   - discontinue HAT given continued pressor requirement resolved     Endocrine:   1. H/o DMII  2. Stress induced hyperglycemia  3. Hx of iatrogenic cushings disease - previously on steroids  4. Hypothyroidism  Plan  - ICU insulin protocol, goal sugar <180, discontinue insulin gtt  - IV levothyroxine  - stress dose steroids, weaned to 50 q24, stop after tomorrow's dose  - discontinue D5W, Free water fluses     Hematology/Oncology:   1. Acute blood loss anemia - in setting of perf ulcer/AC, s/p drainage, Hgb 19 on admission, stable     2. Polycythemia- on admission hematocrit of > 17 on previous admission.    3. Thrombocytopenia - sepsis related, resolved    Plan  - monitor daily CBC     IV/Access:   1. Venous access - PICC placed 1/5   2. Arterial access - discontinued   Plan  - central access to be established if patient is requiring vasopressors     ICU Prophylaxis:   1. DVT: lovenox (ok with surgery) - at lower dose given prior bleed  2. VAP: HOB 30 degrees, chlorhexidine rinse  3. Stress Ulcer: PPI  4. Restraints: Nonviolent soft two point restraints required and necessary for patient safety and continued cares and good effect as patient continues to pull at necessary lines, tubes despite education and distraction. Will readdress daily.   5. Wound care -per surgery and unit routine   6. Feeding - NPO for medical reasons. NGT to suction.    7. Family Update: no  8. Disposition - ICU.  Prognosis guarded.      Billing: This patient is critically ill: Yes. Total critical care time today 40 min excluding  procedures. Resp failure, abd sepsis        Key goals for next 24 hours:   1. Start methadone, discontinue fentanyl gtt  2. discontinue merropenem , start zosyn  3. Free water  4. EKG follow qtc  5. Nocturnal BIPAP  6. Advance enteral feeds  7.  Hospitalist consult           Key Medications:       chlorhexidine  15 mL Mouth/Throat Q12H     enoxaparin ANTICOAGULANT  40 mg Subcutaneous Q24H     [START ON 1/7/2020] hydrocortisone sodium succinate PF  50 mg Intravenous Daily     levothyroxine  88 mcg Oral QAM AC     [START ON 1/7/2020] pantoprazole  40 mg Oral QAM AC     piperacillin-tazobactam  4.5 g Intravenous Q6H     QUEtiapine  25 mg Oral BID     sodium chloride (PF)  10 mL Intracatheter Q8H       dextrose       fentaNYL 100 mcg/hr (01/06/20 0938)     insulin (regular) 3 Units/hr (01/06/20 0819)     - MEDICATION INSTRUCTIONS -       - MEDICATION INSTRUCTIONS -       propofol (DIPRIVAN) infusion Stopped (01/04/20 1430)     ACE/ARB/ARNI NOT PRESCRIBED       BETA BLOCKER NOT PRESCRIBED       STATIN NOT PRESCRIBED                 Physical Examination:   Temp:  [97.4  F (36.3  C)-98.4  F (36.9  C)] 98  F (36.7  C)  Pulse:  [53-78] 66  Heart Rate:  [53-79] 70  Resp:  [10-20] 18  BP: ()/(44-70) 130/58  FiO2 (%):  [30 %] 30 %  SpO2:  [98 %-100 %] 100 %      Intake/Output Summary (Last 24 hours) at 1/3/2020 1317  Last data filed at 1/3/2020 1300  Gross per 24 hour   Intake 3631.76 ml   Output 2040 ml   Net 1591.76 ml         Wt Readings from Last 4 Encounters:   01/05/20 135 kg (297 lb 9.9 oz)   11/01/19 113.4 kg (250 lb)   10/28/19 121.7 kg (268 lb 3.2 oz)   08/29/19 117.5 kg (259 lb)     BP - Mean:  [] 95  Ventilation Mode: CPAP/PS  (Continuous positive airway pressure with Pressure Support)  FiO2 (%): 30 %  Rate Set (breaths/minute): 16 breaths/min  Tidal Volume Set (mL): 500 mL  PEEP (cm H2O): 6 cmH2O  Pressure Support (cm H2O): 5 cmH2O  Oxygen Concentration (%): 30 %  Resp: 18    Recent Labs   Lab  01/04/20  0422 01/03/20  0525 12/30/19  1303   PH 7.37 7.40 7.37   PCO2 42 38 36   PO2 145* 103 97   HCO3 24 23 21   O2PER 30  --   --        GEN: intubated, more awake and following commands    HEENT: head ncat, sclera anicteric, OP patent, trachea midline   PULM: unlabored synchronous with vent, clear anteriorly diminished at bases and laterally   CV/COR: tachycardic irregular S1S2 no gallop,  No rub, no murmur  ABD:  Obese soft  Mild tenderness  Midline tube c/d/i hypoactive bowel sounds, no mass noted hernia  EXT: warm, no edema   NEURO: limited by sedation    SKIN: no obvious rash  LINES: clean, dry intact         Data:   All data and imaging reviewed     ROUTINE ICU LABS (Last four results)  CMP  Recent Labs   Lab 01/06/20  0411 01/05/20  0510 01/04/20  0416 01/03/20  0402 01/02/20  0416  12/31/19  0400    143 143 145* 148*   < > 146*   POTASSIUM 3.8 3.3* 3.8 4.3 3.9   < > 3.7   CHLORIDE 110* 110* 111* 115* 118*   < > 115*   CO2 27 28 25 24 22   < > 19*   ANIONGAP 6 5 7 6 8   < > 12   * 119* 179* 124* 133*   < > 285*   BUN 36* 42* 48* 50* 59*   < > 86*   CR 0.95 0.94 1.12* 1.18* 1.47*   < > 2.50*   GFRESTIMATED 64 65 52* 49* 38*   < > 20*   GFRESTBLACK 74 75 60* 57* 44*   < > 23*   DORY 8.2* 8.0* 7.3* 6.8* 6.5*   < > 6.3*   MAG 1.9  --   --   --  2.2  --  1.8   PHOS 2.0*  --   --   --   --   --   --    PROTTOTAL  --  4.4* 4.7* 4.6* 4.8*   < > 4.8*   ALBUMIN  --  1.9* 2.2* 2.1* 2.3*   < > 2.7*   BILITOTAL  --  1.1 1.0 1.1 0.9   < > 2.5*   ALKPHOS  --  73 72 73 89   < > 74   AST  --  80* 141* 281* 391*   < > 977*   ALT  --  292* 444* 669* 842*   < > 1,263*    < > = values in this interval not displayed.     CBC  Recent Labs   Lab 01/06/20  0411 01/05/20  0510 01/04/20  0416 01/03/20  0402   WBC 22.7* 20.8* 25.3* 27.7*   RBC 3.65* 3.51* 3.53* 3.85   HGB 10.5* 9.7* 10.0* 10.7*   HCT 32.1* 30.8* 31.0* 33.3*   MCV 88 88 88 87   MCH 28.8 27.6 28.3 27.8   MCHC 32.7 31.5 32.3 32.1   RDW 17.9* 17.4* 17.3*  17.1*    184 148* 119*     INR  No lab results found in last 7 days.  Arterial Blood Gas  Recent Labs   Lab 01/04/20  0422 01/03/20  0525 12/30/19  1303   PH 7.37 7.40 7.37   PCO2 42 38 36   PO2 145* 103 97   HCO3 24 23 21   O2PER 30  --   --        All cultures:  Recent Labs   Lab 01/04/20  1455 01/02/20  1233   CULT No growth after 2 days Cultured on the 1st day of incubation:  Staphylococcus hominis  *  Critical Value/Significant Value, preliminary result only, called to and read back by  Sangeeta SHERMAN 1.3.20 @1139 AEM    Susceptibility testing done on previous specimen     No results found for this or any previous visit (from the past 24 hour(s)).

## 2020-01-06 NOTE — PLAN OF CARE
PT/OT: Per discussion with RN, pt not appropriate for therapies today, but RN anticipates pt likely will be appropriate tomorrow.

## 2020-01-06 NOTE — PROGRESS NOTES
Surgery    In ICU  Extubated  Patient unable to verbalize needs but does nod her head to answer.    Blood pressure 133/75, pulse 86, temperature 97.6  F (36.4  C), temperature source Axillary, resp. rate 18, weight 135 kg (297 lb 9.9 oz), SpO2 99 %  Resp - non labored    Abdomen - soft, non tender to palpation. Dressing over incision changed this morning. minimal drainage. Extremities - no lower extremity edema.    Drains with expected output in terms of volume and character.   TF infusing.     Weaned off pressors.  IV Abx continued.  Seemingly stable from surgical standpoint.   Continue ICU management.       Juan Boyle PA-C  Office: [phone]  Pager: [phone]

## 2020-01-06 NOTE — PROGRESS NOTES
Respiratory care note Extubated pt and placed on 4lpm oxi mask.98% sat. Vitals stable.bipap on standby.BMunyanRT

## 2020-01-06 NOTE — PROGRESS NOTES
Brief ICU note  Patient was briefly assessed on breathing trial, and was titrated down to 6/6. She has done well on this for more than an hour, and will give a trial of extubation (RSBI in teens).     Will also focus on pulmonary hygiene with cpap back up if needed.     I spent 20 minutes of critical care time with her today assessing acute respiratory failure (CHF, pneumonia), giving a dose fo IV lasix, and managing ventilator.     julio cesar westbrook  January 6, 2020

## 2020-01-06 NOTE — PROGRESS NOTES
"[facility]    Hospitalist Progress Note    Assessment & Plan   Bhavani White is a 64 year old female who was admitted on 12/29/2019.     Past medical history significant for chronic pain on methadone, obesity, ANIYA, hypothyroidism, DM2 with neuropathy, OA, MDD, GERD, HLP, Asthma who was admitted due to perforated duodenal ulcer with associated septic shock, NSTEMI and acute respiratory failure with hypoxia.      Hospital Summary:  --12/29/2019 admitted to the ICU.  She had presented to [facility] via EMS with a 3 day prodrome of abdominal pain with nausea and vomiting.  She had not been taking her meds (methadone) for 3 days and developed AMS prompting patient's  to call 911.  En route to [facility], patient had vomited up \"fecal material\" and suspected aspiration event.  Patient was immediately intubated in the ED due to respiratory failure.  Work-up revealed an EKG with some dynamic changes and incomplete bundle branch block.  Troponin elevated ~20, lactic acid of 3, ROLANDO with creatinine of 1.6 and acute hepatitis with LFT's in the 700's.  C/A/P CT with small effusions, patchy GGO in RML consistent with aspiration, fat stranding around the pylorus and anterior duodenum suspicious for perforation as small amount of extraluminal gas was present.  IV Zosyn, IVF, Pressor support with 4 agents, general surgery consulted.    --12/30/2019 General surgery took patient to the OR for exploration.  No overt leak seen, noted gastric and duodenal ischemia and hemorrhage found.  Blood was evacuated and drain placed.    --12/31/2019 Hemodynamic improvement, removal of 2 pressors.  Developed hypernatremia and IVF changed to D5W.    --1/1/2020 Returned to the OR.  Unable to repair perforation due to size.  Drain placed for containment.  Bowel ischemia improved on visualization.  Continued pressor support.    --1/2/2020 Blood culture from ART line positive for GPC,  No acute changes.    --1/3/2020 Blood cultures growing " staph hominis.    --1/4/2020 ART line removed, repeat blood cultures drawn.    --1/5/2020 PICC placed.    --1/6/2020 Extubated, transferred to Hospitalist service.      Perforated duodenal ulcer with hemorrhage and bowel ischemia:  S/p exploratory lap, TRUONG, Malecot placement in perforation, and TAC on 12/29. S/p ex lap, TRUONG, closure of duodenal perf over Malecot, G tube, jejunostomy, and delayed primary closure on 1/01.  Noted fat stranding around the pylorus and anterior duodenum suspicious for perforation as small amount of extraluminal gas was present on CT scan.    --Appreciate general surgery consult:   --Taken to the OR twice.  THAI/Tubes with expected output.     --Post-op cares and drain management per surgery.    --Continue trickle tube feds.    --Continue IV antibiotics as below.    --Protonix 40 mg every morning.    --Hold PTA ASA 81 mg daily; due to gastric hemorrhage with duodenal ulceration.      Septic shock with Staph hominis bacteremia and associated shocked liver and acute respiratory failure with hypoxia:  Resolving  Intubated emergently in the ED 12/29/2019.  Upon admit WBC of 20.5 and has continued to be elevated, lactic acid of 3.  LFTS elevated and peaked with ALT of 1363 and AST of 1044; 1/5  and AST of 80.  Blood cultures positive for staph hominis from ART line with subsequent removal.  PICC line placed 1/4.  Repeat blood cultures no growth to date.  Extubated 1/6.  Completed fungal prophylaxis (5 days).  Initially started on broad spectrum (Zosyn, Vanco and Meropenem) antibiotics with adjustments that have included Vancomycin stopped 1/2 and Meropenem stopped 1/6.    --Follow blood culture results.    --Continue IV Zosyn.    --Supplemental O2, wean as tolerated.    --Continuous pulse oximetry monitoring.    --PT/OT/SLP.      Acute MI:  Upon admit troponin of 19.402 and normalized trend.  Thought to be due to stress induced cardiomyopathy.  EKG repeated on 12/30 with sinus tachycardia  with right bundle branch block and septal Q waves.    --Appreciate Cardiology Consult: Signed off   --Initially started on heparin drip and aspirin therapy but discontinued the next day.     --As she recovers, consider additional evaluation and treatment for cardiomyopathy and possible CAD.  --Telemetry.      Severe cardiomyopathy:  ECHO with EF of 25%.  Suspected stress induced.    --Appreciate Cardiology Consult: Signed off   --Initially started on heparin drip and aspirin therapy but discontinued the next day.     --As she recovers, consider additional evaluation and treatment for cardiomyopathy and possible CAD.  --Monitor daily weights, I's and O's.    --Hold on any diuretic therapy.      Metabolic, septic encephalopathy:  Improving  Continues to be mildly sedated; answering some questions but mainly nodding yes to everything.    --Resume Seroquel 25 mg BID.      Aspiration pneumonia:  Noted right middle lobe pneumonia on CT scan, following aspiration event that occurred en route to ED.  Intubated upon admit and successfully extubated 1/6.     --Continue IV Zosyn.    --Pulmonary hygiene:  IS and Flutter valve.    --RCAT.      HTN:  PTA medication of lisinopril 5 mg daily.    --Hold PTA lisinopril.    --PRN IV hydralazine available for SBP > 180.      HLP:  --Hold PTA Zocor 20 mg at bedtime.      GERD:  --Hold PTA Zantc.    --Continue Protonix as above.      ROLANDO:  Resolved.  Peaked creatinine of 2.5 with GFR of 20; today (1/6) creatinine of 0.95 with GFR of 64.     --Monitor.      Acute blood loss anemia with question of polycythemia:  Stable.  Secondary to perforated duodenal ulceration and surgical losses.  Hemoglobin of 19.2 upon admit, likely heme-concentrated.  Although, with elevated previous studies.  Noted drop with IVF and surgical intervention.    Recent Labs   Lab 01/06/20  0411 01/05/20  0510 01/04/20  0416 01/03/20  0402 01/02/20  0416 01/01/20  1345   HGB 10.5* 9.7* 10.0* 10.7* 11.8 12.3    --Monitor.      TCP:  Resolved.  12/31 developed low platelets of 126, which resolved today (1/6) 242.    --Monitor.        History of MRSA:  Positive nares screening, noted MRSA UTI.    --Contact isolation.      Chronic cystitis with chronic suprapubic catheter:  --Continue suprapubic cath cares.    --Hold PTA oxybutynin and pyridium.      Hypernatremia:  Resolved.    Na as high as 152, today 1/6 at 143.    --Monitor.      Stress induced hyperglycemia in setting of DM2 with neuropathy:  Previous A1c of 7.3% from 10/2019.  PTA regimen includes metformin 500 mg BID.    Recent Labs   Lab 01/06/20  1353 01/06/20  1148 01/06/20  0934 01/06/20  0749 01/06/20  0610 01/06/20  0411 01/06/20  0216  01/05/20  0510  01/04/20  0416  01/03/20  0402  01/02/20  0416  01/01/20  1305   GLC  --   --   --   --   --  137*  --   --  119*  --  179*  --  124*  --  133*  --  152*   * 125* 139* 129* 111*  --  127*   < >  --    < >  --    < >  --    < >  --    < >  --     < > = values in this interval not displayed.   --Check A1c.    --On insulin drip.    Hypothyroidism:  --Resume PTA PO levothyroxine 88 mcg daily.      Chronic pain on chronic methadone, with OA, neuropathy and MDD:  PTA regimen includes methadone 20 mg every morning, 10 mg every afternoon (14:00) and 20 mg at bedtime, gabapentin 1600 mg TID Zanaflex 4 mg TID.  Whiel intubated was on fentanyl drip which has since been stopped.    --Methadone 10 mg every 6 hour; dose now  --Gabapentin at reduced dose of 800 mg TID to avoid withdraw.    --Wean off Fentanyl drip; decrease to 50 mcg with first does of Methadone.  Will decrease to 25 mcg after second dose of methadone and after the third dose turn off fentanyl drip.    --PRN IV Fentanyl available.    --Hold PTA Cymbalta 120 mg daily.    --Hold scheduled Zanaflex 4 mg TID.      History of iatrogenic Cushings disease:  Previously on steroids.    --Currently receiving stress dosed steroids with solu-cortef 50 mg IV daily.       Obesity with suspected ANIYA:  BMI of 50.3.  No longer on CPAP but does have an order for BiPAP for home use.    --BIPAP/CPAP at night.      Asthma:  PTA regimen includes PRN Ventolin inhaler, Xopenex neb every 8 hours PRN, Albuterol neb every 4 hours PRN.  Does not appear to be on maintenance inhalers.    --PRN Xopenex neb every 8 hours for shortness of breath or wheezing.    --Continuous pulse oximetry monitoring.      Physical Deconditioning:  Review of PTA medications and DME revealed an order for motorized wheel chair and is wheel chair bound.  Unclear what patient's activity and ambulatory status is at baseline.    --PT/OT.    Diet: Adult Formula Drip Feeding: Continuous Peptamen Intense VHP; Jejunostomy; Goal Rate: 15; mL/hr; Medication - Feeding Tube Flush Frequency: At least 15-30 mL water before and after medication administration and with tube clogging; Amount to Send (N...     DVT Prophylaxis: Enoxaparin (Lovenox) SQ   Lundberg Catheter: not present  Code Status: Full Code     Disposition Plan    Expected discharge: > 7 days, recommended to awaiting PT/OT asessments with recommendations once clinical improvement with multiple medical issues as above.   Entered: Romain Vega PA-C 01/06/2020, 1:06 PM        The patient has been discussed with Dr. Galdamez, who agrees with the assessment and plan at this time.  Dr. Galdamez will evaluate the patient independently.      Godwin Vega PA-C   [phone]    Interval History   Patient was seen in her hospital room with nursing staff.  Patient was asleep upon arrival.  She awakened to her name.  She spoke very little and said it was hard to talk.  She was able to nod or shake her head no.  She indicated that she was in pain, she has fevers, shortness of breath.      -Data reviewed today: I reviewed all new labs and imaging results over the last 24 hours. I personally reviewed no images or EKG's today.    Physical Exam   Temp: 98  F (36.7  C) Temp src:  Axillary BP: 126/66 Pulse: 67 Heart Rate: 68 Resp: 18 SpO2: 99 % O2 Device: Oxymask Oxygen Delivery: 4 LPM  Vitals:    01/03/20 0415 01/04/20 0000 01/05/20 0000   Weight: 130 kg (286 lb 9.6 oz) 131 kg (288 lb 12.8 oz) 135 kg (297 lb 9.9 oz)     Vital Signs with Ranges  Temp:  [97.4  F (36.3  C)-98.4  F (36.9  C)] 98  F (36.7  C)  Pulse:  [53-78] 67  Heart Rate:  [53-79] 68  Resp:  [10-20] 18  BP: ()/(44-70) 126/66  FiO2 (%):  [30 %] 30 %  SpO2:  [98 %-100 %] 99 %  I/O last 3 completed shifts:  In: 4282.88 [I.V.:3382.88; NG/GT:540]  Out: 3550 [Urine:2590; Emesis/NG output:150; Drains:810]      Constitutional: Asleep, awakened to name.  Nods and shakes head to answer questions.    ENT: Normocephalic, without obvious abnormality, atraumatic, oral pharynx with dry mucus membranes, tonsils without erythema or exudates.  Neck: Supple, symmetrical, trachea midline, no adenopathy.  Pulmonary: No increased work of breathing, fair air exchange, clear to auscultation bilaterally, no crackles or wheezing.  Cardiovascular: Regular rate and rhythm, normal S1 and S2, no S3 or S4, and no murmur noted.  GI: Normo-hypoactive bowel sounds, soft, non-distended, non-tender.  Drains in place.    Skin/Integumen: Clear.  Neuro: CN II-XII grossly intact; patient with difficult time speaking.  Voice appears stuck in the back of the throat.    Psych:  Alert and oriented x 1. Flat affect.  Extremities: Trace lower extremity edema noted, and calves are non-TTP bilaterally.   : Lundberg catheter in place.        Medications     dextrose       fentaNYL 50 mcg/hr (01/06/20 1435)     insulin (regular) 3 Units/hr (01/06/20 0819)     - MEDICATION INSTRUCTIONS -       - MEDICATION INSTRUCTIONS -       propofol (DIPRIVAN) infusion Stopped (01/04/20 1430)     ACE/ARB/ARNI NOT PRESCRIBED       BETA BLOCKER NOT PRESCRIBED       STATIN NOT PRESCRIBED         chlorhexidine  15 mL Mouth/Throat Q12H     enoxaparin ANTICOAGULANT  40 mg Subcutaneous Q24H      gabapentin  800 mg Oral or Feeding Tube TID     [START ON 1/7/2020] hydrocortisone sodium succinate PF  50 mg Intravenous Daily     [START ON 1/7/2020] levothyroxine  88 mcg Oral or Feeding Tube QAM AC     methadone  10 mg Oral or Feeding Tube Q6H KENIA     [START ON 1/7/2020] pantoprazole  40 mg Oral or Feeding Tube QAM AC     piperacillin-tazobactam  4.5 g Intravenous Q6H     QUEtiapine  25 mg Oral or Feeding Tube BID     sodium chloride (PF)  10 mL Intracatheter Q8H       Data   Recent Labs   Lab 01/06/20  0411 01/05/20  0510 01/04/20  0416   WBC 22.7* 20.8* 25.3*   HGB 10.5* 9.7* 10.0*   MCV 88 88 88    184 148*    143 143   POTASSIUM 3.8 3.3* 3.8   CHLORIDE 110* 110* 111*   CO2 27 28 25   BUN 36* 42* 48*   CR 0.95 0.94 1.12*   ANIONGAP 6 5 7   DORY 8.2* 8.0* 7.3*   * 119* 179*   ALBUMIN  --  1.9* 2.2*   PROTTOTAL  --  4.4* 4.7*   BILITOTAL  --  1.1 1.0   ALKPHOS  --  73 72   ALT  --  292* 444*   AST  --  80* 141*       No results found for this or any previous visit (from the past 24 hour(s)).

## 2020-01-06 NOTE — PROGRESS NOTES
[facility] ICU RESPIRATORY NOTE           Date of Admission: 12/29/2019     Date of Intubation (most recent): 12/29/19     Reason for Mechanical Ventilation: Respiratory failure     Number of Days on Mechanical Ventilation: 9     Met Criteria for Spontaneous Breathing Trial: Yes -      Significant Events Today: None overnight     ABG Results:   Recent Labs   Lab 01/04/20  0422 01/03/20  0525 12/30/19  1303   PH 7.37 7.40 7.37   PCO2 42 38 36   PO2 145* 103 97   HCO3 24 23 21   O2PER 30  --   --      Ventilation Mode: CMV/AC  (Continuous Mandatory Ventilation/ Assist Control)  FiO2 (%): 30 %  Rate Set (breaths/minute): 16 breaths/min  Tidal Volume Set (mL): 500 mL  PEEP (cm H2O): 8 cmH2O  Pressure Support (cm H2O): 12 cmH2O  Oxygen Concentration (%): 30 %  Resp: 18    Will continue to follow.     Juan Francisco Marley, RT

## 2020-01-06 NOTE — PLAN OF CARE
Pt alert, off sedation, will follow commands as able, weak and deconditioned. Can nod head to yes/no questions. X2 doses ketamine given for breakthrough pain while on fentanyl gtt. VSS with occasional bradycardia in 50s. Insulin gtt remains at 1.5-3 units/hr. Pressure support 8/8 started at 0600,  Still in progress. no s/sx distress.  Abdominal drain dressings changed prn. weight could not be completed d/t incorrect bed scale, scale reading 0 kg even after lifting pt off bed with ceiling lift and zeroing bed.  called and updated on pt condition.

## 2020-01-07 ENCOUNTER — APPOINTMENT (OUTPATIENT)
Dept: SPEECH THERAPY | Facility: CLINIC | Age: 65
DRG: 853 | End: 2020-01-07
Attending: PHYSICIAN ASSISTANT
Payer: MEDICARE

## 2020-01-07 ENCOUNTER — APPOINTMENT (OUTPATIENT)
Dept: PHYSICAL THERAPY | Facility: CLINIC | Age: 65
DRG: 853 | End: 2020-01-07
Attending: PHYSICIAN ASSISTANT
Payer: MEDICARE

## 2020-01-07 PROBLEM — M43.22 FUSION OF SPINE OF CERVICAL REGION: Status: ACTIVE | Noted: 2020-01-07

## 2020-01-07 LAB
ANION GAP SERPL CALCULATED.3IONS-SCNC: 5 MMOL/L (ref 3–14)
BASOPHILS # BLD AUTO: 0 10E9/L (ref 0–0.2)
BASOPHILS NFR BLD AUTO: 0.1 %
BUN SERPL-MCNC: 36 MG/DL (ref 7–30)
CALCIUM SERPL-MCNC: 8.3 MG/DL (ref 8.5–10.1)
CHLORIDE SERPL-SCNC: 110 MMOL/L (ref 94–109)
CO2 SERPL-SCNC: 28 MMOL/L (ref 20–32)
CREAT SERPL-MCNC: 0.91 MG/DL (ref 0.52–1.04)
DIFFERENTIAL METHOD BLD: ABNORMAL
EOSINOPHIL # BLD AUTO: 0.1 10E9/L (ref 0–0.7)
EOSINOPHIL NFR BLD AUTO: 0.3 %
ERYTHROCYTE [DISTWIDTH] IN BLOOD BY AUTOMATED COUNT: 18.7 % (ref 10–15)
GFR SERPL CREATININE-BSD FRML MDRD: 67 ML/MIN/{1.73_M2}
GLUCOSE BLDC GLUCOMTR-MCNC: 102 MG/DL (ref 70–99)
GLUCOSE BLDC GLUCOMTR-MCNC: 104 MG/DL (ref 70–99)
GLUCOSE BLDC GLUCOMTR-MCNC: 104 MG/DL (ref 70–99)
GLUCOSE BLDC GLUCOMTR-MCNC: 112 MG/DL (ref 70–99)
GLUCOSE BLDC GLUCOMTR-MCNC: 116 MG/DL (ref 70–99)
GLUCOSE BLDC GLUCOMTR-MCNC: 129 MG/DL (ref 70–99)
GLUCOSE BLDC GLUCOMTR-MCNC: 148 MG/DL (ref 70–99)
GLUCOSE BLDC GLUCOMTR-MCNC: 154 MG/DL (ref 70–99)
GLUCOSE BLDC GLUCOMTR-MCNC: 162 MG/DL (ref 70–99)
GLUCOSE BLDC GLUCOMTR-MCNC: 165 MG/DL (ref 70–99)
GLUCOSE BLDC GLUCOMTR-MCNC: 84 MG/DL (ref 70–99)
GLUCOSE BLDC GLUCOMTR-MCNC: 95 MG/DL (ref 70–99)
GLUCOSE BLDC GLUCOMTR-MCNC: 99 MG/DL (ref 70–99)
GLUCOSE SERPL-MCNC: 125 MG/DL (ref 70–99)
HBA1C MFR BLD: 7.1 % (ref 0–5.6)
HCT VFR BLD AUTO: 32.5 % (ref 35–47)
HGB BLD-MCNC: 10.1 G/DL (ref 11.7–15.7)
IMM GRANULOCYTES # BLD: 0.4 10E9/L (ref 0–0.4)
IMM GRANULOCYTES NFR BLD: 1.5 %
INTERPRETATION ECG - MUSE: NORMAL
LYMPHOCYTES # BLD AUTO: 0.6 10E9/L (ref 0.8–5.3)
LYMPHOCYTES NFR BLD AUTO: 2.6 %
MAGNESIUM SERPL-MCNC: 2 MG/DL (ref 1.6–2.3)
MCH RBC QN AUTO: 27.8 PG (ref 26.5–33)
MCHC RBC AUTO-ENTMCNC: 31.1 G/DL (ref 31.5–36.5)
MCV RBC AUTO: 90 FL (ref 78–100)
MONOCYTES # BLD AUTO: 1.7 10E9/L (ref 0–1.3)
MONOCYTES NFR BLD AUTO: 7.4 %
NEUTROPHILS # BLD AUTO: 19.9 10E9/L (ref 1.6–8.3)
NEUTROPHILS NFR BLD AUTO: 88.1 %
PLATELET # BLD AUTO: 292 10E9/L (ref 150–450)
POTASSIUM SERPL-SCNC: 3.9 MMOL/L (ref 3.4–5.3)
RBC # BLD AUTO: 3.63 10E12/L (ref 3.8–5.2)
SODIUM SERPL-SCNC: 143 MMOL/L (ref 133–144)
WBC # BLD AUTO: 22.6 10E9/L (ref 4–11)

## 2020-01-07 PROCEDURE — 25000131 ZZH RX MED GY IP 250 OP 636 PS 637: Mod: GY | Performed by: INTERNAL MEDICINE

## 2020-01-07 PROCEDURE — 40000239 ZZH STATISTIC VAT ROUNDS

## 2020-01-07 PROCEDURE — 25800030 ZZH RX IP 258 OP 636: Performed by: INTERNAL MEDICINE

## 2020-01-07 PROCEDURE — 92610 EVALUATE SWALLOWING FUNCTION: CPT | Mod: GN | Performed by: SPEECH-LANGUAGE PATHOLOGIST

## 2020-01-07 PROCEDURE — 20000003 ZZH R&B ICU

## 2020-01-07 PROCEDURE — 97161 PT EVAL LOW COMPLEX 20 MIN: CPT | Mod: GP | Performed by: PHYSICAL THERAPIST

## 2020-01-07 PROCEDURE — 92526 ORAL FUNCTION THERAPY: CPT | Mod: GN | Performed by: SPEECH-LANGUAGE PATHOLOGIST

## 2020-01-07 PROCEDURE — 97530 THERAPEUTIC ACTIVITIES: CPT | Mod: GP | Performed by: PHYSICAL THERAPIST

## 2020-01-07 PROCEDURE — 25000132 ZZH RX MED GY IP 250 OP 250 PS 637: Mod: GY | Performed by: INTERNAL MEDICINE

## 2020-01-07 PROCEDURE — 00000146 ZZHCL STATISTIC GLUCOSE BY METER IP

## 2020-01-07 PROCEDURE — 25000132 ZZH RX MED GY IP 250 OP 250 PS 637: Mod: GY | Performed by: PHYSICIAN ASSISTANT

## 2020-01-07 PROCEDURE — 80048 BASIC METABOLIC PNL TOTAL CA: CPT | Performed by: PHYSICIAN ASSISTANT

## 2020-01-07 PROCEDURE — 83735 ASSAY OF MAGNESIUM: CPT | Performed by: PHYSICIAN ASSISTANT

## 2020-01-07 PROCEDURE — 83036 HEMOGLOBIN GLYCOSYLATED A1C: CPT | Performed by: PHYSICIAN ASSISTANT

## 2020-01-07 PROCEDURE — 27210437 ZZH NUTRITION PRODUCT SEMIELEM INTERMED LITER

## 2020-01-07 PROCEDURE — 25000128 H RX IP 250 OP 636: Performed by: INTERNAL MEDICINE

## 2020-01-07 PROCEDURE — 99207 ZZC CDG-MDM COMPONENT: MEETS MODERATE - UP CODED: CPT | Performed by: INTERNAL MEDICINE

## 2020-01-07 PROCEDURE — 94660 CPAP INITIATION&MGMT: CPT

## 2020-01-07 PROCEDURE — 85025 COMPLETE CBC W/AUTO DIFF WBC: CPT | Performed by: PHYSICIAN ASSISTANT

## 2020-01-07 PROCEDURE — 40000275 ZZH STATISTIC RCP TIME EA 10 MIN

## 2020-01-07 PROCEDURE — 99233 SBSQ HOSP IP/OBS HIGH 50: CPT | Performed by: INTERNAL MEDICINE

## 2020-01-07 RX ORDER — POTASSIUM CHLORIDE 7.45 MG/ML
10 INJECTION INTRAVENOUS
Status: DISCONTINUED | OUTPATIENT
Start: 2020-01-07 | End: 2020-01-18

## 2020-01-07 RX ORDER — HYDROMORPHONE HYDROCHLORIDE 1 MG/ML
.3-.5 INJECTION, SOLUTION INTRAMUSCULAR; INTRAVENOUS; SUBCUTANEOUS
Status: DISCONTINUED | OUTPATIENT
Start: 2020-01-07 | End: 2020-01-21 | Stop reason: HOSPADM

## 2020-01-07 RX ORDER — METHADONE HYDROCHLORIDE 10 MG/1
20 TABLET ORAL EVERY 12 HOURS SCHEDULED
Status: DISCONTINUED | OUTPATIENT
Start: 2020-01-07 | End: 2020-01-07

## 2020-01-07 RX ORDER — METHADONE HYDROCHLORIDE 10 MG/1
10 TABLET ORAL EVERY 24 HOURS
Status: DISCONTINUED | OUTPATIENT
Start: 2020-01-07 | End: 2020-01-07

## 2020-01-07 RX ORDER — POTASSIUM CHLORIDE 1.5 G/1.58G
20-40 POWDER, FOR SOLUTION ORAL
Status: DISCONTINUED | OUTPATIENT
Start: 2020-01-07 | End: 2020-01-18

## 2020-01-07 RX ORDER — METHADONE HYDROCHLORIDE 10 MG/1
20 TABLET ORAL EVERY 12 HOURS SCHEDULED
Status: DISCONTINUED | OUTPATIENT
Start: 2020-01-07 | End: 2020-01-21 | Stop reason: HOSPADM

## 2020-01-07 RX ORDER — HYDROCORTISONE 10 MG/1
10 TABLET ORAL DAILY
Status: COMPLETED | OUTPATIENT
Start: 2020-01-11 | End: 2020-01-13

## 2020-01-07 RX ORDER — METHADONE HYDROCHLORIDE 10 MG/1
10 TABLET ORAL EVERY 24 HOURS
Status: DISCONTINUED | OUTPATIENT
Start: 2020-01-07 | End: 2020-01-21

## 2020-01-07 RX ORDER — POTASSIUM CHLORIDE 1500 MG/1
20-40 TABLET, EXTENDED RELEASE ORAL
Status: DISCONTINUED | OUTPATIENT
Start: 2020-01-07 | End: 2020-01-18

## 2020-01-07 RX ORDER — HYDROCORTISONE 10 MG/1
20 TABLET ORAL DAILY
Status: COMPLETED | OUTPATIENT
Start: 2020-01-09 | End: 2020-01-10

## 2020-01-07 RX ORDER — POTASSIUM CL/LIDO/0.9 % NACL 10MEQ/0.1L
10 INTRAVENOUS SOLUTION, PIGGYBACK (ML) INTRAVENOUS
Status: DISCONTINUED | OUTPATIENT
Start: 2020-01-07 | End: 2020-01-18

## 2020-01-07 RX ORDER — OXYCODONE HYDROCHLORIDE 5 MG/1
5 TABLET ORAL EVERY 4 HOURS PRN
Status: DISCONTINUED | OUTPATIENT
Start: 2020-01-07 | End: 2020-01-21 | Stop reason: HOSPADM

## 2020-01-07 RX ORDER — HYDROCORTISONE 20 MG/1
20 TABLET ORAL 2 TIMES DAILY
Status: COMPLETED | OUTPATIENT
Start: 2020-01-07 | End: 2020-01-08

## 2020-01-07 RX ORDER — MAGNESIUM SULFATE HEPTAHYDRATE 40 MG/ML
4 INJECTION, SOLUTION INTRAVENOUS EVERY 4 HOURS PRN
Status: DISCONTINUED | OUTPATIENT
Start: 2020-01-07 | End: 2020-01-21 | Stop reason: HOSPADM

## 2020-01-07 RX ORDER — POTASSIUM CHLORIDE 29.8 MG/ML
20 INJECTION INTRAVENOUS
Status: DISCONTINUED | OUTPATIENT
Start: 2020-01-07 | End: 2020-01-18

## 2020-01-07 RX ADMIN — SODIUM CHLORIDE 1 UNITS/HR: 9 INJECTION, SOLUTION INTRAVENOUS at 22:04

## 2020-01-07 RX ADMIN — GABAPENTIN 800 MG: 250 SOLUTION ORAL at 16:14

## 2020-01-07 RX ADMIN — HYDROMORPHONE HYDROCHLORIDE 0.5 MG: 1 INJECTION, SOLUTION INTRAMUSCULAR; INTRAVENOUS; SUBCUTANEOUS at 19:55

## 2020-01-07 RX ADMIN — GABAPENTIN 800 MG: 250 SOLUTION ORAL at 21:40

## 2020-01-07 RX ADMIN — METHADONE HYDROCHLORIDE 10 MG: 10 TABLET ORAL at 05:26

## 2020-01-07 RX ADMIN — PIPERACILLIN AND TAZOBACTAM 4.5 G: 4; .5 INJECTION, POWDER, FOR SOLUTION INTRAVENOUS at 16:10

## 2020-01-07 RX ADMIN — PIPERACILLIN AND TAZOBACTAM 4.5 G: 4; .5 INJECTION, POWDER, FOR SOLUTION INTRAVENOUS at 06:47

## 2020-01-07 RX ADMIN — ENOXAPARIN SODIUM 40 MG: 40 INJECTION SUBCUTANEOUS at 16:14

## 2020-01-07 RX ADMIN — METHADONE HYDROCHLORIDE 20 MG: 10 TABLET ORAL at 19:54

## 2020-01-07 RX ADMIN — LEVOTHYROXINE SODIUM 88 MCG: 88 TABLET ORAL at 08:26

## 2020-01-07 RX ADMIN — QUETIAPINE 25 MG: 25 TABLET ORAL at 08:26

## 2020-01-07 RX ADMIN — Medication 40 MG: at 06:47

## 2020-01-07 RX ADMIN — GABAPENTIN 800 MG: 250 SOLUTION ORAL at 08:25

## 2020-01-07 RX ADMIN — PIPERACILLIN AND TAZOBACTAM 4.5 G: 4; .5 INJECTION, POWDER, FOR SOLUTION INTRAVENOUS at 21:39

## 2020-01-07 RX ADMIN — PIPERACILLIN AND TAZOBACTAM 4.5 G: 4; .5 INJECTION, POWDER, FOR SOLUTION INTRAVENOUS at 00:46

## 2020-01-07 RX ADMIN — METHADONE HYDROCHLORIDE 20 MG: 10 TABLET ORAL at 09:16

## 2020-01-07 RX ADMIN — HYDROCORTISONE 20 MG: 20 TABLET ORAL at 10:59

## 2020-01-07 RX ADMIN — HYDROCORTISONE 20 MG: 20 TABLET ORAL at 21:38

## 2020-01-07 RX ADMIN — QUETIAPINE 25 MG: 25 TABLET ORAL at 21:38

## 2020-01-07 RX ADMIN — METHADONE HYDROCHLORIDE 10 MG: 10 TABLET ORAL at 16:14

## 2020-01-07 ASSESSMENT — ACTIVITIES OF DAILY LIVING (ADL)
ADLS_ACUITY_SCORE: 29
ADLS_ACUITY_SCORE: 31
ADLS_ACUITY_SCORE: 29
DRESS: 3-->ASSISTIVE EQUIPMENT AND PERSON
ADLS_ACUITY_SCORE: 29
RETIRED_EATING: 3-->ASSISTIVE EQUIPMENT AND PERSON
ADLS_ACUITY_SCORE: 33
ADLS_ACUITY_SCORE: 33
BATHING: 3-->ASSISTIVE EQUIPMENT AND PERSON
SWALLOWING: 2-->DIFFICULTY SWALLOWING FOODS
TOILETING: 3-->ASSISTIVE EQUIPMENT AND PERSON
COGNITION: 0 - NO COGNITION ISSUES REPORTED
RETIRED_COMMUNICATION: 0-->UNDERSTANDS/COMMUNICATES WITHOUT DIFFICULTY

## 2020-01-07 NOTE — PLAN OF CARE
Discharge Planner OT   Patient plan for discharge: N/A  Current status: Orders rec'd and chart reviewed. 63 yo female adm via EMS after  called as pt with AMS. Found to have abdominal sepsis 2/2 perforated duodenal ulcer, elevated troponins/NSTEMI, acute hypoxemic respiratory failure requiring intubation.  S/p ex lap, TRUONG, Malecot placement in perforation, and TAC on 12/29. S/p ex lap, TRUONG, closure of duodenal perf over Malecot, G tube, jejunostomy, and delayed primary closure on 1/01. Extubated 1/6/2020. Spoke with PT, pt currently requires use of ceiling lift for transfers and lethargic, unable to gather PLOF. Will defer OT to PT at this time, as PT to continue to work with pt and when pt becomes appropriate for OT please request OT orders.   Barriers to return to prior living situation: see PT note  Recommendations for discharge: see PT note  Rationale for recommendations: pt not appropriate for OT at this time, PT to continue with therapy for strengthening and balance, ROM, OT order completed. Please reorder if pt more alert and able to participate in ADL's.       Entered by: Patricia Villa 01/07/2020 3:09 PM

## 2020-01-07 NOTE — PLAN OF CARE
S - Night Shift status    B - Day # 9, s/p admit for abd pain:  12/30: N/V.  Pt with NSTEMI, shock on admit. EF 25% exploratory, duodenal ulcer perforation- repair ulcer, wound vac placement. 1/1/20: gastric-jejunal feeding tube insertion, secondary abdominal wound closure. Multiple PMH including chronic pain- on methadone, high BMI, ANIYA     A - Eyes open spontaneously, tracks and follows simple commands. Weakly moving all four extremities, BUE>BLE. Restless off and on and was somewhat tachypneic while on oxymask. On BIPAP overnight. NSR/SB, with few ectopies.  Afebrile. Fentanyl infusion weaned off at 0000. Abd Soft, tolerating FT well, well-formed stools x 2.  UOP ~ 50 cc/hour from SPC.  JPs in place, all draining well.  Dressings to all abd insertion site tubings done at 0000. FT up to 30 cc/hr at 1800. Will increase to 45 cc/hr on 1/7 at 1800    R: Brother In-law and spouse called- update given. Continue on rotation bed

## 2020-01-07 NOTE — SUMMARY OF CARE
Bedside report given to on-coming RN.  Ports, lines, tubings, dressings and infusing meds/FT verified, acknowledged.

## 2020-01-07 NOTE — PLAN OF CARE
Patient extubated at 0942 by RT. Tolerating oxymask 2 LPM well. Patient denies pain, fentanyl drip weaned from 100 mcg per hour to 25 mcg per hour. Patient's methadone restarted per Md order. Patient alert, sleepy at times, follows commands, confused. Vital signs stable. Updated patient spouse per telephone.

## 2020-01-07 NOTE — PROGRESS NOTES
"   01/07/20 1450   General Information   Onset Date 12/29/19   Start of Care Date 01/07/20   Referring Physician Romain Vega PA-C   Patient Profile Review/OT: Additional Occupational Profile Info See Profile for full history and prior level of function   Patient/Family Goals Statement Pt unable to state.   Swallowing Evaluation Bedside swallow evaluation   Behaviorial Observations Lethargic;Distractible  (Slow to respond when awake; Weak voicing during attempts)   Mode of current nutrition NPO  (J tube, G tube drain)   Respiratory Status O2 Supply   Type of O2 supply Nasal cannula   Comments Per PA note: Past medical history significant for chronic pain on methadone, obesity, ANIYA, hypothyroidism, DM2 with neuropathy, OA, MDD, GERD, HLP, Asthma who was admitted due to perforated duodenal ulcer with associated septic shock, NSTEMI and acute respiratory failure with hypoxia.   12/29/2019 admitted to the ICU.  She had presented to [facility] via EMS with a 3 day prodrome of abdominal pain with nausea and vomiting.  She had not been taking her meds (methadone) for 3 days and developed AMS prompting patient's  to call 911.  En route to [facility], patient had vomited up \"fecal material\" and suspected aspiration event.  Patient was immediately intubated in the ED due to respiratory failure.  Work-up revealed an EKG with some dynamic changes and incomplete bundle branch block.  Troponin elevated ~20, lactic acid of 3, ROLANDO with creatinine of 1.6 and acute hepatitis with LFT's in the 700's.  C/A/P CT with small effusions, patchy GGO in RML consistent with aspiration, fat stranding around the pylorus and anterior duodenum suspicious for perforation as small amount of extraluminal gas was present.  IV Zosyn, IVF, Pressor support with 4 agents, general surgery consulted.    S/p exploratory lap, TRUONG, Malecot placement in perforation, and TAC on 12/29. S/p ex lap, TRUONG, closure of duodenal perf over Malecot, G tube, jejunostomy, and " delayed primary closure on 1/01.   1/6/2020 Extubated, transferred to Hospitalist service.     5/16-SLP services after cervical fusion, advanced to regular diet.   Clinical Swallow Evaluation   Oral Musculature unable to assess due to poor participation/comprehension  (follows some commands)   Dentition edentulous, does not have dentures   Laryngeal Function   (weak cough/throat clear/voicing)   Clinical Swallow Eval: Thin Liquid Texture Trial   Mode of Presentation, Thin Liquids spoon   Volume of Liquid or Food Presented ice chips   Oral Phase of Swallow Premature pharyngeal entry  (decreased awareness)   Pharyngeal Phase of Swallow reduction in laryngeal movement  (delay; inconsistent swallow)   Diagnostic Statement 3/5 swallows with mod-max cues/assist, wet voice x 2 after swallows   Swallow Compensations   Swallow Compensations Pacing;Reduce amounts;Effortful swallow   Esophageal Phase of Swallow   Patient reports or presents with symptoms of esophageal dysphagia Yes   Esophageal comments Hx of GERD   Swallow Eval: Clinical Impressions   Skilled Criteria for Therapy Intervention Skilled criteria met.  Treatment indicated.   Functional Assessment Scale (FAS) 2   Treatment Diagnosis mod-severe oral-pharyngeal dysphagia with overall level of fatigue impacting function   Diet texture recommendations NPO   Therapy Frequency Daily   Predicted Duration of Therapy Intervention (days/wks) 1 week   Anticipated Discharge Disposition inpatient rehabilitation facility   Risks and Benefits of Treatment have been explained. Yes   Patient, family and/or staff in agreement with Plan of Care Yes   Clinical Impression Comments Pt presents with mod-severe oral-pharyngeal dysphagia with overall level of fatigue impacting function.  Pt inconsistently followed commands.  Swallow response was also inconsistent with mod-max cues needed to swallow ice chip trials.  Pt is at high risk for silent aspiration at present due to recent  intubation, fatigue, delayed to absent swallow, and weak cough and voicing on command.  Recommend continued NPO status with frequent oral cares.  Plan to provide swallow Tx to complete exercises and assess safety for po intake (also as inidicated per GI status).   Total Evaluation Time   Total Evaluation Time (Minutes) 10

## 2020-01-07 NOTE — PROGRESS NOTES
Surgery    In ICU  On Bipap  Less responsive today.   + bowel function charted     Blood pressure 110/66, pulse 73, temperature 98.8  F (37.1  C), temperature source Axillary, resp. rate 20, weight (!) 167 kg (368 lb 2.7 oz), SpO2 96 %  Abd - soft, no signs of pain with palpation. Incision with minimal fibrinous exudate. Dressings changes.     Abd tubes appropriate.   JPs with serosang output.   Malecot with dark bilious fluid.   J tube with TF infusing.   G tube thin light fluid in tubing.     Perforated duodenal ulcer with hemorrhagic shock and bowel ischemia, s/p ex lap, TRUONG, Malecot drain placement in perforation, and wound VAC on 12/29/2019, repeat ex-lap, TRUONG, closure of duodenal perforation over Malecot, G-tube/J-tube placements, and delayed primary closure on 1/1/2020    - Appreciate hospitalist management  - No acute abdominal concerns.     Juan Boyle PA-C  Office: [phone]  Pager: [phone]

## 2020-01-07 NOTE — PROGRESS NOTES
"   01/07/20 1400   Quick Adds   Type of Visit Initial PT Evaluation   Living Environment   Lives With spouse   Functional Level Prior   Ambulation 4-->completely dependent   Toileting 3-->assistive equipment and person   Bathing 3-->assistive equipment and person   Prior Functional Level Comment Per notes from 10/2016 following a cervical procedure PT notes stated \"Pt does not ambulate at baseline; pt completes transfers with stand pivot on RLE. Then uses power w/c. Pt was (I) with power chair mobility, transfers, toileting, light cooking/cleaning, meds, and finances; pt's spouse does assist some with shower transfer and bathing; pt does not drive; pt is on disability\"   General Information   Onset of Illness/Injury or Date of Surgery - Date 12/29/19   Referring Physician Juan Boyle, MISSAEL   Patient/Family Goals Statement None stated   Pertinent History of Current Problem (include personal factors and/or comorbidities that impact the POC) 63 yo female adm via EMS after  called as pt with AMS. Found to have abdominal sepsis 2/2 perforated duodenal ulcer, elevated troponins/NSTEMI, acute hypoxemic respiratory failure requiring intubation.  S/p ex lap, TRUONG, Malecot placement in perforation, and TAC on 12/29. S/p ex lap, TRUONG, closure of duodenal perf over Malecot, G tube, jejunostomy, and delayed primary closure on 1/01. Extubateed 1/6.2020   Precautions/Limitations abdominal precautions;oxygen therapy device and L/min  (3L via NC)   Cognitive Status Examination   Orientation orientation to person, place and time   Level of Consciousness lethargic/somnolent   Cognitive Comment Only verbalization was to say \"Ouch my stomach hurts!,\" and \"I'm going to throw up, get my head up!\"    Pain Assessment   Patient Currently in Pain Yes, see Vital Sign flowsheet   Posture    Posture Comments B LEs \"frog leg\" position, wide abdomen, knees, ankles and hips iwht contractures.    Range of Motion (ROM)   ROM Comment B LEs " "contracturesa at ankles, knees and likely hips. \"frog legs.'   Strength   Strength Comments Limited strength, does not hold arms against gravity, facial grimace with gentle PROM at the shoulders B.   Bed Mobility   Bed Mobility Comments Dependent   Transfer Skills   Transfer Comments Dependent   Gait   Gait Comments Non-ambulatory at baseline.   Balance   Balance Comments Upright in chair, use of chair tilt for mid.ine orientation   Muscle Tone   Muscle Tone Comments Minimal muscle tone noted, B LEs are very thin-disuse. UEs more bulky, but puffy in appearance.    General Therapy Interventions   Planned Therapy Interventions balance training;bed mobility training;neuromuscular re-education;ROM;strengthening;stretching;transfer training;progressive activity/exercise   Clinical Impression   Criteria for Skilled Therapeutic Intervention yes, treatment indicated   PT Diagnosis Impaired functional transfers   Influenced by the following impairments Weakness, fatigue, impaired activitity tolerance, pain   Functional limitations due to impairments Decreased functional independence   Clinical Presentation Stable/Uncomplicated   Clinical Presentation Rationale see MR   Clinical Decision Making (Complexity) Low complexity   Therapy Frequency 5x/week   Predicted Duration of Therapy Intervention (days/wks) 2 weeks   Anticipated Discharge Disposition Transitional Care Facility   Risk & Benefits of therapy have been explained Yes   Patient, Family & other staff in agreement with plan of care Yes   [facility] TM \"6 Clicks\"   2016, Trustees of [facility], under license to [employer].  All rights reserved.   6 Clicks Short Forms Basic Mobility Inpatient Short Form   [facility] [employer]  \"6 Clicks\" V.2 Basic Mobility Inpatient Short Form   1. Turning from your back to your side while in a flat bed without using bedrails? 1 - Total   2. Moving from lying on your back to sitting on the side of a flat bed " without using bedrails? 1 - Total   3. Moving to and from a bed to a chair (including a wheelchair)? 1 - Total   4. Standing up from a chair using your arms (e.g., wheelchair, or bedside chair)? 1 - Total   5. To walk in hospital room? 1 - Total   6. Climbing 3-5 steps with a railing? 1 - Total   Basic Mobility Raw Score (Score out of 24.Lower scores equate to lower levels of function) 6   Total Evaluation Time   Total Evaluation Time (Minutes) 10

## 2020-01-07 NOTE — PROGRESS NOTES
CPAP/BiPAP Settings  IPAP: 12  EPAP: 5  Rate: 16  FiO2: 50  Timed Inspiration (sec): .90    CPAP/BiPAP/AVAPS/AVAPS AE Alarms  High Pressure: 20  Low Pressure: 5  Low Pressure Delay: 20  High Rate (breaths/min): 45  Low Rate (breaths/min): 5  Alarm Volume Level: 10    CPAP/BiPAP/AVAPS/AVAPS AE Patient Assessment  Barriers Applied: gel pad in place    Plan:  Will continue to monitor

## 2020-01-07 NOTE — PROGRESS NOTES
[facility]    Hospitalist Progress Note    Date of Service (when I saw the patient): 01/07/2020    Assessment & Plan   Bhavani White is a 64 year old female with hx of DM2, chronic cystitis s/p chronic SP catheter, chronic pain on methadone maintenance, asthma, ANIYA, and hypothyroidism who was admitted to the ICU service on 12/29/2019 for a perforated duodenal ulcer with associated septic shock and bowel ischemia. She is s/p ex-lap x2, but perforation could not be entirely repaired due to size; she is s/p THAI drains and GJ tube placement . Hospital course has otherwise been complicated by stress cardiomyopathy. She was weaned off pressors on 1/5 and successfully extubated on 1/6. She was transferred the the Hospitalist service on 1/6.     Perforated duodenal ulcer with hemorrhagic shock and bowel ischemia, s/p ex lap, TRUONG, Malecot drain placement in perforation, and wound VAC on 12/29/2019, repeat ex-lap, TRUONG, closure of duodenal perforation over Malecot, G-tube/J-tube placements, and delayed primary closure on 1/1/2020  * Presented with 3 day history of progressive abdominal pain, nausea, and vomiting. En route, she was noted by EMS to vomit feculent material with associated aspiration, and was hypotensive on arrival. She was intubated in the ED, initiated on 4 pressor agents, and stress-dose steroids. CT chest/abd/pelvis on arrival showed RML infiltrate consistent with aspiration and evidence of perforated duodenum. She was initiated on IV pip-tazo, and taken emergent to the OR by General Surgery   * 12/29-12/30: s/p ex lap, TRUONG, Malecot placement in perforation, and TAC. Gastric and duodenal ischemia and hemorrhage noted, no obvious leak. Blood was evacuated and drain placed.    * Returned to OR on 1/1 for repeat ex lap, TRUONG, closure of duodenal perf over Malecot, and GJ tube placement. Unable to completely repair perforation due to size. THAI drains placed for containment. Bowel ischemia improved  on visualization  * Weaned off pressors on 1/5  - Post-operative management as per General Surgery  - On IV pip-tazo, see septic shock/aspiration pneumonia below  - On 7-day hydrocortisone taper (last day: 1/13)  - On tube feeds, titrating to goal rate  - Continues on pantoprazole 40 mg PO daily   - Holding PTA aspirin due to PUD/GI hemorrhage    Acute blood loss anemia due to above  Baseline Hgb 16-19, decreased to 9-10 range from perforated ulcer and surgical losses.   - Hgb 10.1 today, will follow    Septic shock with Staph hominis bacteremia  Elevated transaminases due to septic shock  Aspiration event noted en route to the ED. AMS, hypotension, leukocytosis (20.5k), elevated LFTs (AST 1044, ALT 1363) present on arrival. 1 out of 2 blood cultures on admission grew Staph hominis, and arterial line on 1/2 also grew Staph hominis. This was removed upon return of blood cultures. She initially required 4 pressor agents and stress dose steroids which were gradually weaned; has been off pressors since 1/5.   She has been on the following anti-infective regimen:  * 12/29-1/2: IV vancomycin  * 12/30-1/6: IV meropenem  * 12/30-1/2: IV fluconazole for fungal prophylaxis  * 12/29-12/30, 1/7 to present: IV pip-tazo  - Continues on IV pip-tazo alone  - On 7-day hydrocortisone taper (last day: 1/13)  - Repeat blood culture on 1/4: NGTD    Acute hypoxic respiratory failure due to aspiration pneumonia, Improving  - Continues on IV pip-tazo as noted above  - Encourage incentive spirometry, flutter valve, RCAT  - On 2 lpm/OM, biPAP prn - wean as tolerated     Elevated troponin due to suspected demand ischemia, initial concern for NSTEMI  Chronic systolic CHF due to stress cardiomyopathy  Initial troponin on arrival was elevated to 19.4 and subsequently trended down. EKG showed RBBB with septal Q waves. TTE (12/29) showed LVEF 30-40% with global hypokinesis predominantly involving the apex. Clinical presentation was felt by  Cardiology to be consistent with stress cardiomyopathy. She was initially treated with IV heparin, but this was discontinued within 24 hours    - Clinically appears compensated, euvolemic  - Consider repeat TTE and ischemia work-up once more clinically stable    - Holding aspirin due to duodenal ulcer  - Holding statin due to elevated LFTs  - Cardiology has signed off     Acute metabolic encephalopathy, Improved  Started on scheduled Seroquel while in the ICU  - Currently arouses to voice, nods 'yes/no' inconsistently to questions  - Currently on Seroquel 25 mg BID, will continue for now and wean during this hospitalization       Chronic pain syndrome  * PTA methadone 20/10/20 mg TID, gabapentin 1600 mg TID, tizanidine 4 mg TID  - Continues on PTA methadone  - PTA gabapentin has been decreased to 800 mg TID in the setting of AMS and recent ROLANDO  - Holding PTA tizanidine  - Oxycodone, IV Dilaudid PRN    ROLANDO stage II, Improving  Creatinine up to 2.5 from baseline 0.7-0.8. Suspect pre-renal state due to shock. Renal function improved with aggressive fluid resuscitation and blood pressure management.  - Creatinine 0.91 and improving      DM2 with peripheral neuropathy  A1c 7.1. PTA on metformin 500 mg BID  - On insulin gtt while titrating tube feeds to goal rate    History of chronic cystitis s/p chronic suprapubic catheter  - SP catheter in place  - Holding PTA oxybutynin and pyridium.      Stage 2 pressure ulcer on left buttocks  Stage 1 pressure ulcers on sacrum and LLE  Present on admission  - Wound cares in place as per WOCN     Essential hypertension  * PTA: lisinopril 5 mg daily.    - Holding lisinopril in setting of recent shock     Hyperlipidemia  - Holding PTA simvastatin due to elevated LFTs        Hypothyroidism:  - Continues on PTA levothyroxine 88 mcg daily.         Mild intermittent asthma  PTA on albuterol, levalbuterol PRN  - Bronchodilators available PRN     History of ANIYA  Reportedly no longer on PAP  therapy  - Currently on biPAP qhs        Morbid obesity  BMI 62    FEN: NPO, on tube feeds  Drains/Tubes: SP catheter (chronic), THAI drain x2, GJ tube (1/1)  Lines: RUE PICC, 1/5  DVT Prophylaxis: Enoxaparin (Lovenox) SQ  Code Status: Full Code    Disposition: [place] [facility] [place] bed available. Expected discharge once cleared by General Surgery, 3-5+ days    Lily Hassan    Interval History   No acute events overnight. Opens eyes to voice, nods 'yes/no' inconsistently to questions.   - Continue insulin gtt while titrate tube feeds to goal rate  - Continue Zosyn  - Change biPAP to qhs  - Resume PTA methadone  - Start hydrocortisone taper   - [place] [facility]    Time Spent on this Encounter   I spent 50 minutes on the unit/floor managing the care of Bhavani White. Over 50% of my time was spent on the following:   - Coordination of care with the: consultant(s) and nurse    Lily Hassan MD    -Data reviewed today: I reviewed all new labs and imaging results over the last 24 hours. I personally reviewed no images or EKG's today.    Physical Exam   Temp: 98  F (36.7  C) Temp src: Axillary BP: 120/60 Pulse: 73 Heart Rate: 78 Resp: 20 SpO2: 100 % O2 Device: BiPAP/CPAP Oxygen Delivery: 2 LPM  Vitals:    01/04/20 0000 01/05/20 0000 01/07/20 0034   Weight: 131 kg (288 lb 12.8 oz) 135 kg (297 lb 9.9 oz) (!) 167 kg (368 lb 2.7 oz)     Vital Signs with Ranges  Temp:  [97.6  F (36.4  C)-98  F (36.7  C)] 98  F (36.7  C)  Pulse:  [56-86] 73  Heart Rate:  [55-78] 78  Resp:  [14-22] 20  BP: ()/(42-94) 120/60  FiO2 (%):  [30 %] 30 %  SpO2:  [96 %-100 %] 100 %  I/O last 3 completed shifts:  In: 3194.23 [I.V.:2179.23; NG/GT:580]  Out: 4205 [Urine:3260; Emesis/NG output:300; Drains:645]    Constitutional: Resting comfortably, NAD  HEENT: Sclera white, MMM  Respiratory: Breathing non-labored. Lungs CTA anteriorly  Cardiovascular: Heart sounds distant, RRR. No pedal edema  GI: +BS, abd soft. Dressing over  incision in place. THAI drains in place with sero-sanginous fluid. GJ tube in place.    : SP catheter in place draining clear, sean urine  Skin/Integument: Abdominal incision dressing in place  Musculoskeletal: Normal muscle bulk and tone  Neuro: Alert, arouses to voice. Nods yes/no inconsistently to questions, does not follow commands, TORRES  Psych: Calm, cooperative with exam    Medications     dextrose       insulin (regular) 1 Units/hr (01/07/20 0834)     - MEDICATION INSTRUCTIONS -       - MEDICATION INSTRUCTIONS -       ACE/ARB/ARNI NOT PRESCRIBED       BETA BLOCKER NOT PRESCRIBED       STATIN NOT PRESCRIBED         enoxaparin ANTICOAGULANT  40 mg Subcutaneous Q24H     gabapentin  800 mg Oral or Feeding Tube TID     hydrocortisone  20 mg Per Feeding Tube BID    Followed by     [START ON 1/9/2020] hydrocortisone  20 mg Oral Daily    Followed by     [START ON 1/11/2020] hydrocortisone  10 mg Oral Daily     levothyroxine  88 mcg Oral or Feeding Tube QAM AC     methadone  10 mg Oral or Feeding Tube Q24H     methadone  20 mg Oral or Feeding Tube Q12H KENIA     pantoprazole  40 mg Oral or Feeding Tube QAM AC     piperacillin-tazobactam  4.5 g Intravenous Q6H     QUEtiapine  25 mg Oral or Feeding Tube BID     sodium chloride (PF)  10 mL Intracatheter Q8H     Data   Recent Labs   Lab 01/07/20  0350 01/06/20  0411 01/05/20  0510 01/04/20  0416   WBC 22.6* 22.7* 20.8* 25.3*   HGB 10.1* 10.5* 9.7* 10.0*   MCV 90 88 88 88    242 184 148*    143 143 143   POTASSIUM 3.9 3.8 3.3* 3.8   CHLORIDE 110* 110* 110* 111*   CO2 28 27 28 25   BUN 36* 36* 42* 48*   CR 0.91 0.95 0.94 1.12*   ANIONGAP 5 6 5 7   DORY 8.3* 8.2* 8.0* 7.3*   * 137* 119* 179*   ALBUMIN  --   --  1.9* 2.2*   PROTTOTAL  --   --  4.4* 4.7*   BILITOTAL  --   --  1.1 1.0   ALKPHOS  --   --  73 72   ALT  --   --  292* 444*   AST  --   --  80* 141*       No results found for this or any previous visit (from the past 24 hour(s)).

## 2020-01-07 NOTE — PLAN OF CARE
"Discharge Planner PT   Patient plan for discharge: None stated.    Current status:   Evaluation completed, treatment initiated. 65 yo paraplegic presented with AMS. Found to have a perforated,s/p ex lap, TRUONG, Malecot placement in perforation, and TAC on 12/29. S/p ex lap, TRUONG, closure of duodenal perf over Malecot, G tube, jejunostomy, and delayed primary closure on 1/01/20.    Prior notes form PT (2016) indicate the pt was able to pivot bed>wc and use power chair for locomotion. Notes also indicate her  was able to help with bathing and dressing.    Presents today alert to self, does not verbalize with subjective questioning. Only verbalized \"My stomach hurts,\" \"Put my head up,\" and \"I'm going to throw up,\" during dependent supine transfer bed>converter chair. Pt up right in chair, tilted to the L for improved midline orientation. Able to shrug the shoulders, but can not hold UEs against gravity. ?if unable to follow commands or just has delay in processing.    Barriers to return to prior living situation: Not at baseline with mobility, fatigue, lethargy, impaired activity tolerance and strength.    Recommendations for discharge: TCU    Rationale for recommendations: Pt will benefit from continued skilled rehab services to progress independence and safety with functional mobility prior to return home.        Entered by: Pam Hendrickson 01/07/2020 3:06 PM       "

## 2020-01-08 LAB
ALBUMIN SERPL-MCNC: 1.3 G/DL (ref 3.4–5)
ALP SERPL-CCNC: 83 U/L (ref 40–150)
ALT SERPL W P-5'-P-CCNC: 89 U/L (ref 0–50)
ANION GAP SERPL CALCULATED.3IONS-SCNC: 5 MMOL/L (ref 3–14)
AST SERPL W P-5'-P-CCNC: 31 U/L (ref 0–45)
BILIRUB SERPL-MCNC: 0.8 MG/DL (ref 0.2–1.3)
BUN SERPL-MCNC: 38 MG/DL (ref 7–30)
CALCIUM SERPL-MCNC: 8.5 MG/DL (ref 8.5–10.1)
CHLORIDE SERPL-SCNC: 110 MMOL/L (ref 94–109)
CO2 SERPL-SCNC: 29 MMOL/L (ref 20–32)
CREAT SERPL-MCNC: 0.99 MG/DL (ref 0.52–1.04)
ERYTHROCYTE [DISTWIDTH] IN BLOOD BY AUTOMATED COUNT: 17.8 % (ref 10–15)
GFR SERPL CREATININE-BSD FRML MDRD: 61 ML/MIN/{1.73_M2}
GLUCOSE BLDC GLUCOMTR-MCNC: 106 MG/DL (ref 70–99)
GLUCOSE BLDC GLUCOMTR-MCNC: 117 MG/DL (ref 70–99)
GLUCOSE BLDC GLUCOMTR-MCNC: 123 MG/DL (ref 70–99)
GLUCOSE BLDC GLUCOMTR-MCNC: 138 MG/DL (ref 70–99)
GLUCOSE BLDC GLUCOMTR-MCNC: 139 MG/DL (ref 70–99)
GLUCOSE BLDC GLUCOMTR-MCNC: 141 MG/DL (ref 70–99)
GLUCOSE BLDC GLUCOMTR-MCNC: 141 MG/DL (ref 70–99)
GLUCOSE BLDC GLUCOMTR-MCNC: 144 MG/DL (ref 70–99)
GLUCOSE BLDC GLUCOMTR-MCNC: 153 MG/DL (ref 70–99)
GLUCOSE BLDC GLUCOMTR-MCNC: 153 MG/DL (ref 70–99)
GLUCOSE BLDC GLUCOMTR-MCNC: 161 MG/DL (ref 70–99)
GLUCOSE SERPL-MCNC: 144 MG/DL (ref 70–99)
HCT VFR BLD AUTO: 30.8 % (ref 35–47)
HGB BLD-MCNC: 9.6 G/DL (ref 11.7–15.7)
MCH RBC QN AUTO: 28.2 PG (ref 26.5–33)
MCHC RBC AUTO-ENTMCNC: 31.2 G/DL (ref 31.5–36.5)
MCV RBC AUTO: 90 FL (ref 78–100)
PLATELET # BLD AUTO: 260 10E9/L (ref 150–450)
POTASSIUM SERPL-SCNC: 4 MMOL/L (ref 3.4–5.3)
PROT SERPL-MCNC: 4.2 G/DL (ref 6.8–8.8)
RBC # BLD AUTO: 3.41 10E12/L (ref 3.8–5.2)
SODIUM SERPL-SCNC: 144 MMOL/L (ref 133–144)
TRIGL SERPL-MCNC: 104 MG/DL
WBC # BLD AUTO: 19.8 10E9/L (ref 4–11)

## 2020-01-08 PROCEDURE — 40000275 ZZH STATISTIC RCP TIME EA 10 MIN

## 2020-01-08 PROCEDURE — 25000132 ZZH RX MED GY IP 250 OP 250 PS 637: Mod: GY | Performed by: INTERNAL MEDICINE

## 2020-01-08 PROCEDURE — 12000000 ZZH R&B MED SURG/OB

## 2020-01-08 PROCEDURE — 94660 CPAP INITIATION&MGMT: CPT

## 2020-01-08 PROCEDURE — 85027 COMPLETE CBC AUTOMATED: CPT | Performed by: INTERNAL MEDICINE

## 2020-01-08 PROCEDURE — 40000239 ZZH STATISTIC VAT ROUNDS

## 2020-01-08 PROCEDURE — 25000128 H RX IP 250 OP 636: Performed by: INTERNAL MEDICINE

## 2020-01-08 PROCEDURE — 84478 ASSAY OF TRIGLYCERIDES: CPT | Performed by: INTERNAL MEDICINE

## 2020-01-08 PROCEDURE — 27210437 ZZH NUTRITION PRODUCT SEMIELEM INTERMED LITER

## 2020-01-08 PROCEDURE — 99233 SBSQ HOSP IP/OBS HIGH 50: CPT | Performed by: INTERNAL MEDICINE

## 2020-01-08 PROCEDURE — 80053 COMPREHEN METABOLIC PANEL: CPT | Performed by: INTERNAL MEDICINE

## 2020-01-08 PROCEDURE — 25000131 ZZH RX MED GY IP 250 OP 636 PS 637: Mod: GY | Performed by: INTERNAL MEDICINE

## 2020-01-08 PROCEDURE — 00000146 ZZHCL STATISTIC GLUCOSE BY METER IP

## 2020-01-08 PROCEDURE — 25800030 ZZH RX IP 258 OP 636: Performed by: INTERNAL MEDICINE

## 2020-01-08 RX ORDER — NITROGLYCERIN 0.4 MG/1
0.4 TABLET SUBLINGUAL EVERY 5 MIN PRN
Status: DISCONTINUED | OUTPATIENT
Start: 2020-01-08 | End: 2020-01-18

## 2020-01-08 RX ORDER — QUETIAPINE FUMARATE 25 MG/1
25 TABLET, FILM COATED ORAL AT BEDTIME
Status: DISCONTINUED | OUTPATIENT
Start: 2020-01-08 | End: 2020-01-10

## 2020-01-08 RX ORDER — SODIUM CHLORIDE 9 MG/ML
INJECTION, SOLUTION INTRAVENOUS CONTINUOUS
Status: DISCONTINUED | OUTPATIENT
Start: 2020-01-08 | End: 2020-01-15

## 2020-01-08 RX ADMIN — SENNOSIDES AND DOCUSATE SODIUM 2 TABLET: 8.6; 5 TABLET ORAL at 13:38

## 2020-01-08 RX ADMIN — GABAPENTIN 800 MG: 250 SOLUTION ORAL at 21:41

## 2020-01-08 RX ADMIN — HYDROCORTISONE 20 MG: 20 TABLET ORAL at 21:03

## 2020-01-08 RX ADMIN — PIPERACILLIN AND TAZOBACTAM 4.5 G: 4; .5 INJECTION, POWDER, FOR SOLUTION INTRAVENOUS at 20:17

## 2020-01-08 RX ADMIN — PIPERACILLIN AND TAZOBACTAM 4.5 G: 4; .5 INJECTION, POWDER, FOR SOLUTION INTRAVENOUS at 13:38

## 2020-01-08 RX ADMIN — ENOXAPARIN SODIUM 40 MG: 40 INJECTION SUBCUTANEOUS at 15:58

## 2020-01-08 RX ADMIN — OXYCODONE HYDROCHLORIDE 5 MG: 5 TABLET ORAL at 12:34

## 2020-01-08 RX ADMIN — METHADONE HYDROCHLORIDE 20 MG: 10 TABLET ORAL at 21:03

## 2020-01-08 RX ADMIN — METHADONE HYDROCHLORIDE 20 MG: 10 TABLET ORAL at 08:19

## 2020-01-08 RX ADMIN — GABAPENTIN 800 MG: 250 SOLUTION ORAL at 09:18

## 2020-01-08 RX ADMIN — Medication 40 MG: at 08:19

## 2020-01-08 RX ADMIN — GABAPENTIN 800 MG: 250 SOLUTION ORAL at 15:58

## 2020-01-08 RX ADMIN — OXYCODONE HYDROCHLORIDE 5 MG: 5 TABLET ORAL at 16:17

## 2020-01-08 RX ADMIN — SODIUM CHLORIDE 1 UNITS/HR: 9 INJECTION, SOLUTION INTRAVENOUS at 20:15

## 2020-01-08 RX ADMIN — METHADONE HYDROCHLORIDE 10 MG: 10 TABLET ORAL at 13:38

## 2020-01-08 RX ADMIN — PIPERACILLIN AND TAZOBACTAM 4.5 G: 4; .5 INJECTION, POWDER, FOR SOLUTION INTRAVENOUS at 02:24

## 2020-01-08 RX ADMIN — QUETIAPINE 25 MG: 25 TABLET ORAL at 08:19

## 2020-01-08 RX ADMIN — QUETIAPINE 25 MG: 25 TABLET ORAL at 21:03

## 2020-01-08 RX ADMIN — HYDROCORTISONE 20 MG: 20 TABLET ORAL at 08:19

## 2020-01-08 RX ADMIN — LEVOTHYROXINE SODIUM 88 MCG: 88 TABLET ORAL at 08:19

## 2020-01-08 RX ADMIN — PIPERACILLIN AND TAZOBACTAM 4.5 G: 4; .5 INJECTION, POWDER, FOR SOLUTION INTRAVENOUS at 08:19

## 2020-01-08 ASSESSMENT — ACTIVITIES OF DAILY LIVING (ADL)
ADLS_ACUITY_SCORE: 33

## 2020-01-08 NOTE — PROGRESS NOTES
Pt stable on Bipap overnight, skin protective barriers in place. Alarm set at 10. Will continue to follow.     Juan Francisco Marley, RT

## 2020-01-08 NOTE — PLAN OF CARE
"Pt transitioned off of bipap to mask, then nasal canula.  Satting 100% of 3 L NC.  Up to chair x 1 with RN/PT, worked with speech for first time.  Pt with very little verbal communication, \"My stomach!\" when moving and \"ouch\" when moving, does not vocalize otherwise.  Follows commands and nods head y/n appropriately to questions, indicates no pain when not undergoing large movement by staff.  Drains continue to have bilious fluid.  Tolerating tube feeding, increased to 45cc/hr, one hard, small BM.  Continues on insulin gtt per order.   and NIRMAL at bedside, pt appeared calm and slightly interactive.  "

## 2020-01-08 NOTE — PROGRESS NOTES
[facility]    Hospitalist Progress Note    Date of Service (when I saw the patient): 01/08/2020    Assessment & Plan   Bhavani White is a 64 year old female with hx of DM2, chronic cystitis s/p chronic SP catheter, chronic pain on methadone maintenance, asthma, ANIYA, and hypothyroidism who was admitted to the ICU service on 12/29/2019 for a perforated duodenal ulcer with associated septic shock and bowel ischemia. She is s/p ex-lap x2, but perforation could not be entirely repaired due to size; she is s/p THAI drains and GJ tube placement . Hospital course has otherwise been complicated by stress cardiomyopathy. She was weaned off pressors on 1/5 and successfully extubated on 1/6. She was transferred the the Hospitalist service on 1/6.     Perforated duodenal ulcer with hemorrhagic shock and bowel ischemia, s/p ex lap, TRUONG, Malecot drain placement in perforation, and wound VAC on 12/29/2019, repeat ex-lap, TRUONG, closure of duodenal perforation over Malecot, G-tube/J-tube placements, and delayed primary closure on 1/1/2020  * Presented with 3 day history of progressive abdominal pain, nausea, and vomiting. En route, she was noted by EMS to vomit feculent material with associated aspiration, and was hypotensive on arrival. She was intubated in the ED, initiated on 4 pressor agents, and stress-dose steroids. CT chest/abd/pelvis on arrival showed RML infiltrate consistent with aspiration and evidence of perforated duodenum. She was initiated on IV pip-tazo, and taken emergent to the OR by General Surgery   * 12/29-12/30: s/p ex lap, TRUONG, Malecot placement in perforation, and TAC. Gastric and duodenal ischemia and hemorrhage noted, no obvious leak. Blood was evacuated and drain placed.    * Returned to OR on 1/1 for repeat ex lap, TRUONG, closure of duodenal perf over Malecot, and GJ tube placement. Unable to completely repair perforation due to size. THAI drains placed for containment. Bowel ischemia improved  on visualization  * Weaned off pressors on 1/5  - Post-operative management as per General Surgery  - On IV pip-tazo, see septic shock/aspiration pneumonia below  - On 7-day hydrocortisone taper (last day: 1/13)  - On tube feeds, titrating to goal rate  - Continues on pantoprazole 40 mg PO daily   - Holding PTA aspirin due to PUD/GI hemorrhage    Acute blood loss anemia due to above  Baseline Hgb 16-19, decreased to 9-10 range from perforated ulcer and surgical losses.   - Hgb 9.6 today, will follow    Septic shock with Staph hominis bacteremia  Aspiration event noted en route to the ED. AMS, hypotension, leukocytosis (20.5k), elevated LFT present on arrival. 1 out of 2 blood cultures on admission grew Staph hominis, and arterial line on 1/2 also grew Staph hominis. This was removed upon return of blood cultures. She initially required 4 pressor agents and stress dose steroids which were gradually weaned; has been off pressors since 1/5.   She has been on the following anti-infective regimen:  * 12/29-1/2: IV vancomycin  * 12/30-1/6: IV meropenem  * 12/30-1/2: IV fluconazole for fungal prophylaxis  * 12/29-12/30, 1/7 to present: IV pip-tazo  - WBC improving. Continues on IV pip-tazo alone  - On 7-day hydrocortisone taper (last day: 1/13)  - Repeat blood culture on 1/4: NGTD    Elevated transaminases due to septic shock  AST 1044, ALT 1363 on arrival. Improved with resolution of septic shock  - LFTs improving    Acute hypoxic respiratory failure due to aspiration pneumonia, Improving  - Continues on IV pip-tazo as noted above  - Encourage incentive spirometry, flutter valve, RCAT as able  - On 2 lpm per NC, wean as tolerated     Elevated troponin due to suspected demand ischemia, initial concern for NSTEMI  Chronic systolic CHF due to stress cardiomyopathy  Initial troponin on arrival was elevated to 19.4 and subsequently trended down. EKG showed RBBB with septal Q waves. TTE (12/29) showed LVEF 30-40% with global  hypokinesis predominantly involving the apex. Clinical presentation was felt by Cardiology to be consistent with stress cardiomyopathy. She was initially treated with IV heparin, but this was discontinued within 24 hours    - Clinically appears compensated, euvolemic  - Consider repeat TTE and ischemia work-up once more clinically stable    - Holding aspirin due to duodenal ulcer  - Holding statin due to elevated LFTs  - Cardiology has signed off     Acute metabolic encephalopathy, Improved  Started on scheduled Seroquel while in the ICU  - Currently alert, intermittently oriented  - Decrease Seroquel 25 mg from BID to qhs today, 1/8       Chronic pain syndrome  * PTA methadone 20/10/20 mg TID, gabapentin 1600 mg TID, tizanidine 4 mg TID  - Continues on PTA methadone  - PTA gabapentin has been decreased to 800 mg TID in the setting of AMS and recent ROLANDO  - Holding PTA tizanidine  - Oxycodone, IV Dilaudid PRN    ROLANDO stage II, Improving to resolved  Creatinine up to 2.5 from baseline 0.7-0.8. Suspect pre-renal state due to shock. Renal function improved with aggressive fluid resuscitation and blood pressure management.  - Creatinine appears to have plateaued in 0.9 range      DM2 with peripheral neuropathy  A1c 7.1. PTA on metformin 500 mg BID  - On insulin gtt while titrating tube feeds to goal rate    History of chronic cystitis s/p chronic suprapubic catheter  - SP catheter in place  - Holding PTA oxybutynin and pyridium.      Stage 2 pressure ulcer on left buttocks  Stage 1 pressure ulcers on sacrum and LLE  Present on admission  - Wound cares in place as per WOCN     Essential hypertension  * PTA: lisinopril 5 mg daily.    - Holding lisinopril in setting of recent shock     Hyperlipidemia  - Holding PTA simvastatin due to elevated LFTs        Hypothyroidism:  - Continues on PTA levothyroxine 88 mcg daily.         Mild intermittent asthma  PTA on albuterol, levalbuterol PRN  - Bronchodilators available  PRN     History of ANIYA  Reportedly no longer on PAP therapy  - Currently on biPAP qhs        Morbid obesity  BMI 62    FEN: NPO, on tube feeds  Drains/Tubes: SP catheter (chronic), THAI drain x2, GJ tube (1/1)  Lines: RUE PICC, 1/5  DVT Prophylaxis: Enoxaparin (Lovenox) SQ  Code Status: Full Code    Disposition: Transfer to [facility] on insulin gtt when bed available. Expected discharge once cleared by General Surgery, PT/OT, 3-5+ days. May be a good candidate for LTACH    Lily Hassan    Interval History   No acute events overnight. More oriented this morning per discussion with RN, but still inconsistent  - Continue insulin gtt while titrating tube feeds to goal rate  - Continue Zosyn  - Post-surgical management as per Gen Surgery  - Awaiting [facility] bed    -Data reviewed today: I reviewed all new labs and imaging results over the last 24 hours. I personally reviewed no images or EKG's today.    Physical Exam   Temp: 98.7  F (37.1  C) Temp src: Oral BP: 109/51 Pulse: 76 Heart Rate: 75   SpO2: 98 % O2 Device: Nasal cannula Oxygen Delivery: 2 LPM  Vitals:    01/05/20 0000 01/07/20 0034 01/08/20 0400   Weight: 135 kg (297 lb 9.9 oz) (!) 167 kg (368 lb 2.7 oz) (!) 168 kg (370 lb 6 oz)     Vital Signs with Ranges  Temp:  [97.9  F (36.6  C)-98.8  F (37.1  C)] 98.7  F (37.1  C)  Pulse:  [] 76  Heart Rate:  [66-84] 75  BP: ()/(44-70) 109/51  FiO2 (%):  [40 %] 40 %  SpO2:  [96 %-100 %] 98 %  I/O last 3 completed shifts:  In: 1491.65 [I.V.:31.65; NG/GT:560]  Out: 2385 [Urine:1610; Emesis/NG output:150; Drains:625]    Constitutional: Resting comfortably, NAD  HEENT: Sclera white, MMM  Respiratory: Breathing non-labored. Lungs CTA anteriorly  Cardiovascular: Heart sounds distant, RRR. No pedal edema  GI: +BS, abd soft. Dressing over incision in place. THAI drains in place with sero-sanginous fluid. GJ tube in place.    : SP catheter in place draining clear, yellow urine  Skin/Integument: Abdominal incision dressing in  place  Musculoskeletal: Normal muscle bulk and tone  Neuro: Alert, arouses to voice. Nods yes/no inconsistently to questions, does not follow commands, TORRES  Psych: Calm, cooperative with exam    Medications     dextrose       insulin (regular) 1 Units/hr (01/07/20 2204)     - MEDICATION INSTRUCTIONS -       - MEDICATION INSTRUCTIONS -       ACE/ARB/ARNI NOT PRESCRIBED       BETA BLOCKER NOT PRESCRIBED       STATIN NOT PRESCRIBED         enoxaparin ANTICOAGULANT  40 mg Subcutaneous Q24H     gabapentin  800 mg Oral or Feeding Tube TID     hydrocortisone  20 mg Per Feeding Tube BID    Followed by     [START ON 1/9/2020] hydrocortisone  20 mg Oral Daily    Followed by     [START ON 1/11/2020] hydrocortisone  10 mg Oral Daily     levothyroxine  88 mcg Oral or Feeding Tube QAM AC     methadone  10 mg Oral or Feeding Tube Q24H     methadone  20 mg Oral or Feeding Tube Q12H KENIA     pantoprazole  40 mg Oral or Feeding Tube QAM AC     piperacillin-tazobactam  4.5 g Intravenous Q6H     QUEtiapine  25 mg Oral or Feeding Tube At Bedtime     sodium chloride (PF)  10 mL Intracatheter Q8H     Data   Recent Labs   Lab 01/08/20  0435 01/07/20  0350 01/06/20  0411 01/05/20  0510   WBC 19.8* 22.6* 22.7* 20.8*   HGB 9.6* 10.1* 10.5* 9.7*   MCV 90 90 88 88    292 242 184    143 143 143   POTASSIUM 4.0 3.9 3.8 3.3*   CHLORIDE 110* 110* 110* 110*   CO2 29 28 27 28   BUN 38* 36* 36* 42*   CR 0.99 0.91 0.95 0.94   ANIONGAP 5 5 6 5   DORY 8.5 8.3* 8.2* 8.0*   * 125* 137* 119*   ALBUMIN 1.3*  --   --  1.9*   PROTTOTAL 4.2*  --   --  4.4*   BILITOTAL 0.8  --   --  1.1   ALKPHOS 83  --   --  73   ALT 89*  --   --  292*   AST 31  --   --  80*       No results found for this or any previous visit (from the past 24 hour(s)).

## 2020-01-08 NOTE — PROGRESS NOTES
Surgery    In ICU.   Patient more alert, verbalizes desire for water.     Blood pressure 112/53, pulse 75, temperature 98.7  F (37.1  C), temperature source Oral, resp. rate 20, weight (!) 168 kg (370 lb 6 oz), SpO2 94   Abd - tubes remain in place.     JPs with serosang with slight bilious appearance R>L  Malecot with dark bilious fluid.   J tube with TF infusing.   G tube thin light fluid in wall canister. Returned to gravity drainage.    wbc slightly down to 19.8  hgb 9.6    A/P Perforated duodenal ulcer with hemorrhagic shock and bowel ischemia, s/p ex lap, TRUONG, Malecot drain placement in perforation, and wound VAC on 12/29/2019, repeat ex-lap, TRUONG, closure of duodenal perforation over Malecot, G-tube/J-tube placements, and delayed primary closure on 1/1/2020     - patient may have ice chip and sips of water.   - continue to monitor drainage output.   - bile leak controlled with Malecot and THAI x 2.   - bowel regimen via J tube  Juan Boyle PA-C  Office: [phone]  Pager: [phone]

## 2020-01-08 NOTE — PLAN OF CARE
SLP - RN was consulted this am.  Given pt lethargy, plan to cancel session today.  Will continue as indicated.

## 2020-01-08 NOTE — PROGRESS NOTES
Neuro: nonverb, follows with y/n questions  CV:SR occ PAC, soft pressures  Pulmonary:LS cl/dim  GI/: super pubic cath draining well  Skin: several drains, blanchable coccyx  Lines: PICC  Drips: Insulin and NS TKO  Update: after stripping the THAI tubing, good amount of drainage out of R THAI, L THAI very purulent drainage ( previously serosanguinous)

## 2020-01-09 ENCOUNTER — APPOINTMENT (OUTPATIENT)
Dept: SPEECH THERAPY | Facility: CLINIC | Age: 65
DRG: 853 | End: 2020-01-09
Payer: MEDICARE

## 2020-01-09 LAB
ALBUMIN SERPL-MCNC: 1.3 G/DL (ref 3.4–5)
ALP SERPL-CCNC: 95 U/L (ref 40–150)
ALT SERPL W P-5'-P-CCNC: 74 U/L (ref 0–50)
ANION GAP SERPL CALCULATED.3IONS-SCNC: 4 MMOL/L (ref 3–14)
AST SERPL W P-5'-P-CCNC: 38 U/L (ref 0–45)
BACTERIA SPEC CULT: ABNORMAL
BILIRUB SERPL-MCNC: 0.6 MG/DL (ref 0.2–1.3)
BUN SERPL-MCNC: 43 MG/DL (ref 7–30)
CALCIUM SERPL-MCNC: 8.8 MG/DL (ref 8.5–10.1)
CHLORIDE SERPL-SCNC: 111 MMOL/L (ref 94–109)
CO2 SERPL-SCNC: 31 MMOL/L (ref 20–32)
CREAT SERPL-MCNC: 1.11 MG/DL (ref 0.52–1.04)
ERYTHROCYTE [DISTWIDTH] IN BLOOD BY AUTOMATED COUNT: 17.3 % (ref 10–15)
GFR SERPL CREATININE-BSD FRML MDRD: 53 ML/MIN/{1.73_M2}
GLUCOSE BLDC GLUCOMTR-MCNC: 116 MG/DL (ref 70–99)
GLUCOSE BLDC GLUCOMTR-MCNC: 120 MG/DL (ref 70–99)
GLUCOSE BLDC GLUCOMTR-MCNC: 124 MG/DL (ref 70–99)
GLUCOSE BLDC GLUCOMTR-MCNC: 129 MG/DL (ref 70–99)
GLUCOSE BLDC GLUCOMTR-MCNC: 130 MG/DL (ref 70–99)
GLUCOSE BLDC GLUCOMTR-MCNC: 143 MG/DL (ref 70–99)
GLUCOSE BLDC GLUCOMTR-MCNC: 149 MG/DL (ref 70–99)
GLUCOSE BLDC GLUCOMTR-MCNC: 163 MG/DL (ref 70–99)
GLUCOSE SERPL-MCNC: 123 MG/DL (ref 70–99)
HCT VFR BLD AUTO: 29.1 % (ref 35–47)
HGB BLD-MCNC: 9 G/DL (ref 11.7–15.7)
INTERPRETATION ECG - MUSE: NORMAL
LACTATE BLD-SCNC: 1.5 MMOL/L (ref 0.7–2)
Lab: ABNORMAL
MCH RBC QN AUTO: 28.3 PG (ref 26.5–33)
MCHC RBC AUTO-ENTMCNC: 30.9 G/DL (ref 31.5–36.5)
MCV RBC AUTO: 92 FL (ref 78–100)
PLATELET # BLD AUTO: 279 10E9/L (ref 150–450)
POTASSIUM SERPL-SCNC: 3.9 MMOL/L (ref 3.4–5.3)
PROT SERPL-MCNC: 4.5 G/DL (ref 6.8–8.8)
RBC # BLD AUTO: 3.18 10E12/L (ref 3.8–5.2)
SODIUM SERPL-SCNC: 146 MMOL/L (ref 133–144)
SPECIMEN SOURCE: ABNORMAL
WBC # BLD AUTO: 17.5 10E9/L (ref 4–11)

## 2020-01-09 PROCEDURE — 25000131 ZZH RX MED GY IP 250 OP 636 PS 637: Mod: GY | Performed by: INTERNAL MEDICINE

## 2020-01-09 PROCEDURE — 85027 COMPLETE CBC AUTOMATED: CPT | Performed by: INTERNAL MEDICINE

## 2020-01-09 PROCEDURE — 00000146 ZZHCL STATISTIC GLUCOSE BY METER IP

## 2020-01-09 PROCEDURE — 99233 SBSQ HOSP IP/OBS HIGH 50: CPT | Performed by: INTERNAL MEDICINE

## 2020-01-09 PROCEDURE — 25000132 ZZH RX MED GY IP 250 OP 250 PS 637: Mod: GY | Performed by: INTERNAL MEDICINE

## 2020-01-09 PROCEDURE — 92526 ORAL FUNCTION THERAPY: CPT | Mod: GN | Performed by: SPEECH-LANGUAGE PATHOLOGIST

## 2020-01-09 PROCEDURE — 25000128 H RX IP 250 OP 636: Performed by: INTERNAL MEDICINE

## 2020-01-09 PROCEDURE — 25800029 ZZH RX IP 258 OP 250: Performed by: INTERNAL MEDICINE

## 2020-01-09 PROCEDURE — 27210437 ZZH NUTRITION PRODUCT SEMIELEM INTERMED LITER

## 2020-01-09 PROCEDURE — 12000000 ZZH R&B MED SURG/OB

## 2020-01-09 PROCEDURE — 40000275 ZZH STATISTIC RCP TIME EA 10 MIN

## 2020-01-09 PROCEDURE — 80053 COMPREHEN METABOLIC PANEL: CPT | Performed by: INTERNAL MEDICINE

## 2020-01-09 PROCEDURE — 40000239 ZZH STATISTIC VAT ROUNDS

## 2020-01-09 PROCEDURE — 94660 CPAP INITIATION&MGMT: CPT

## 2020-01-09 PROCEDURE — 83605 ASSAY OF LACTIC ACID: CPT | Performed by: SURGERY

## 2020-01-09 PROCEDURE — 99207 ZZC CDG-MDM COMPONENT: MEETS MODERATE - UP CODED: CPT | Performed by: INTERNAL MEDICINE

## 2020-01-09 PROCEDURE — 12000047 ZZH R&B IMCU

## 2020-01-09 RX ORDER — SODIUM CHLORIDE 450 MG/100ML
INJECTION, SOLUTION INTRAVENOUS CONTINUOUS
Status: ACTIVE | OUTPATIENT
Start: 2020-01-09 | End: 2020-01-09

## 2020-01-09 RX ADMIN — LEVOTHYROXINE SODIUM 88 MCG: 88 TABLET ORAL at 06:40

## 2020-01-09 RX ADMIN — INSULIN ASPART 1 UNITS: 100 INJECTION, SOLUTION INTRAVENOUS; SUBCUTANEOUS at 14:25

## 2020-01-09 RX ADMIN — Medication 40 MG: at 06:40

## 2020-01-09 RX ADMIN — ENOXAPARIN SODIUM 40 MG: 40 INJECTION SUBCUTANEOUS at 16:44

## 2020-01-09 RX ADMIN — INSULIN ASPART 1 UNITS: 100 INJECTION, SOLUTION INTRAVENOUS; SUBCUTANEOUS at 18:36

## 2020-01-09 RX ADMIN — GABAPENTIN 800 MG: 250 SOLUTION ORAL at 16:44

## 2020-01-09 RX ADMIN — GABAPENTIN 800 MG: 250 SOLUTION ORAL at 09:16

## 2020-01-09 RX ADMIN — SODIUM CHLORIDE: 4.5 INJECTION, SOLUTION INTRAVENOUS at 14:24

## 2020-01-09 RX ADMIN — HYDROCORTISONE 20 MG: 10 TABLET ORAL at 09:15

## 2020-01-09 RX ADMIN — INSULIN GLARGINE 25 UNITS: 100 INJECTION, SOLUTION SUBCUTANEOUS at 10:08

## 2020-01-09 RX ADMIN — METHADONE HYDROCHLORIDE 20 MG: 10 TABLET ORAL at 09:15

## 2020-01-09 RX ADMIN — GABAPENTIN 800 MG: 250 SOLUTION ORAL at 22:01

## 2020-01-09 RX ADMIN — METHADONE HYDROCHLORIDE 10 MG: 10 TABLET ORAL at 14:24

## 2020-01-09 RX ADMIN — PIPERACILLIN AND TAZOBACTAM 4.5 G: 4; .5 INJECTION, POWDER, FOR SOLUTION INTRAVENOUS at 02:08

## 2020-01-09 RX ADMIN — PIPERACILLIN AND TAZOBACTAM 4.5 G: 4; .5 INJECTION, POWDER, FOR SOLUTION INTRAVENOUS at 09:15

## 2020-01-09 RX ADMIN — METHADONE HYDROCHLORIDE 20 MG: 10 TABLET ORAL at 20:25

## 2020-01-09 RX ADMIN — INSULIN ASPART 1 UNITS: 100 INJECTION, SOLUTION INTRAVENOUS; SUBCUTANEOUS at 09:13

## 2020-01-09 RX ADMIN — PIPERACILLIN AND TAZOBACTAM 4.5 G: 4; .5 INJECTION, POWDER, FOR SOLUTION INTRAVENOUS at 14:24

## 2020-01-09 RX ADMIN — INSULIN ASPART 1 UNITS: 100 INJECTION, SOLUTION INTRAVENOUS; SUBCUTANEOUS at 22:04

## 2020-01-09 RX ADMIN — PIPERACILLIN AND TAZOBACTAM 4.5 G: 4; .5 INJECTION, POWDER, FOR SOLUTION INTRAVENOUS at 20:24

## 2020-01-09 RX ADMIN — QUETIAPINE 25 MG: 25 TABLET ORAL at 22:01

## 2020-01-09 ASSESSMENT — ACTIVITIES OF DAILY LIVING (ADL)
ADLS_ACUITY_SCORE: 33
ADLS_ACUITY_SCORE: 31
ADLS_ACUITY_SCORE: 31

## 2020-01-09 NOTE — PROGRESS NOTES
Surgery    Transferred to floor.   More alert and responsive.  No complaints.   Requesting water.     Blood pressure (!) 158/70, pulse 77, temperature 99.5  F (37.5  C), resp. rate 18, weight (!) 168 kg (370 lb 6 oz), SpO2 93 %  Abd - soft. ND. Tubes remain in place with appropriate output of volume and character.     Gonzalo's with serous, non bilious, output today.   G-tube with nil output    Wbc decreased 17.5 (19.8)  hgb down to 9.0 (9.6)    A/P Perforated duodenal ulcer with hemorrhagic shock and bowel ischemia, s/p ex lap, TRUONG, Malecot drain placement in perforation, and wound VAC on 12/29/2019, repeat ex-lap, TRUONG, closure of duodenal perforation over Malecot, G-tube/J-tube placements, and delayed primary closure on 1/1/2020    - sips and chips.  - monitor drains.   - PT/OT for strengthening.  - Continue tube feeds.    Juan Boyle PA-C  Office: [phone]  Pager: [phone]

## 2020-01-09 NOTE — PROGRESS NOTES
Patient VSS, Neurologically unchanged, very somnolent but follows commands. All drains emptied, medications given. Patient to be transferred to [facility], Report given to THANG Darling. Chart and all belongings sent with patient. Patient  aware of transfer.     Angelica Anderson RN

## 2020-01-09 NOTE — PROGRESS NOTES
CLINICAL NUTRITION SERVICES - REASSESSMENT NOTE      Malnutrition: (1/2)  % Weight Loss:  None noted  % Intake:  </= 50% for >/= 5 days (severe malnutrition)  Subcutaneous Fat Loss:  None observed  Muscle Loss:  None observed  Fluid Retention:  Mild 2+ generalized      Malnutrition Diagnosis: Non-Severe malnutrition  In Context of:  Acute illness or injury        EVALUATION OF PROGRESS TOWARD GOALS   Diet:    NPO    Nutrition Support:    Type of Feeding Tube:  Jejunostomy  Enteral Frequency:  Continuous  Enteral Regimen:  Peptamen Intense VHP at 60 mL/hr (goal rate)  Total Enteral Provisions:  1440 kcals (12 kcal/kg), 132 gm pro (2.5 gm/kg), 1210 mL H20, 7 gm fiber, 109 gm CHO, 96% RDI  Free Water Flush: (1/6 - MD) 100 mL every 4 hrs  TOTAL FREE WATER (TF + flushes) = 1810 mL/day    Intake/Tolerance:    Pt tolerating TF at goal  No BM noted yesterday (last noted BM 1/7)  -143 (on high sliding scale insulin, lantus every morning)  1/9: Na 146 (H)         K 3.9  1/8: SLP ---> cont NPO, too lethargic to participate in session      ASSESSED NUTRITION NEEDS:  Dosing Weight 122 kg (12/31 wt - energy) and 54.5 kg (protein)  Estimated Energy Needs: 9175-3412 kcals (11-14 Kcal/Kg)  Justification: obese  Estimated Protein Needs: 110-135 grams protein (2-2.5 g pro/Kg)  Justification: hypercatabolism with acute illness and obesity guidelines       NEW FINDINGS:   Bipap over night    Vitals:    12/29/19 1428 12/30/19 0345 12/31/19 0235 01/01/20 0400   Weight: 140 kg (308 lb 10.3 oz) 138.8 kg (306 lb) 122 kg (268 lb 15.4 oz) 124 kg (273 lb 5.9 oz)    01/02/20 0400 01/03/20 0415 01/04/20 0000 01/05/20 0000   Weight: 127 kg (279 lb 15.8 oz) 130 kg (286 lb 9.6 oz) 131 kg (288 lb 12.8 oz) 135 kg (297 lb 9.9 oz)    01/07/20 0034 01/08/20 0400   Weight: (!) 167 kg (368 lb 2.7 oz) (!) 168 kg (370 lb 6 oz)     1/7:  MD charting (No WOCN notes exist):  Stage 2 pressure ulcer on left buttocks  Stage 1 pressure ulcers on sacrum and  LLE  Present on admission - Wound cares in place as per WOCN  **  Unable to order multivitamin 2' to iodine allergy **      Previous Goals (1/6):   TF goal Peptamen Intense VHP at 60 mL/hr will meet % estimated needs  Evaluation: Met    Previous Nutrition Diagnosis (1/6):   Inadequate enteral nutrition infusion related to low TF rate and Propofol and D5 IVF discontinued as evidenced by TF meeting only 1/4-1/3 estimated needs  Evaluation: Improving      CURRENT NUTRITION DIAGNOSIS  No nutrition diagnosis identified at this time       INTERVENTIONS  Recommendations / Nutrition Prescription  NPO (per SLP)  TF as above    Implementation  No intervention at this time    Goals  TF goal Peptamen Intense VHP at 60 mL/hr will meet % estimated      MONITORING AND EVALUATION:  Progress towards goals will be monitored and evaluated per protocol and Practice Guidelines

## 2020-01-09 NOTE — PLAN OF CARE
A/o. AVSS on 1L. Pain managed w/ scheduled methadone. Abd incision CDI. THAI x2 intact. Melacot drain intact. G-tube drain to gravity. J-tube feeding @ goal, inctact. Suprapubic catheter intact, adequate output. NPO ex sips of clears, tolerating PO. A2 with turns/repo. Tele: SR. BRAVO

## 2020-01-09 NOTE — PROGRESS NOTES
[facility]    Hospitalist Progress Note    Date of Service (when I saw the patient): 01/09/2020    Assessment & Plan   Bhavani White is a 64 year old female with hx of DM2, chronic cystitis s/p chronic SP catheter, chronic pain on methadone maintenance, asthma, ANIYA, and hypothyroidism who was admitted to the ICU service on 12/29/2019 for a perforated duodenal ulcer with associated septic shock and bowel ischemia. She is s/p ex-lap x2, but perforation could not be entirely repaired due to size; she is s/p THAI drains and GJ tube placement . Hospital course has otherwise been complicated by stress cardiomyopathy. She was weaned off pressors on 1/5 and successfully extubated on 1/6. She was transferred the the Hospitalist service on 1/6.     Acute metabolic encephalopathy, Improved  Multifactorial due to hemorrhagic/septic shock, ICU delirium. She was started on scheduled Seroquel while in the ICU  - Currently alert, intermittently oriented  - On Seroquel 25 mg qhs, change to PRN tomorrow (1/10)    Perforated duodenal ulcer with hemorrhagic shock and bowel ischemia, s/p ex lap, TRUONG, Malecot drain placement in perforation, and wound VAC on 12/29/2019, repeat ex-lap, TRUONG, closure of duodenal perforation over Malecot, G-tube/J-tube placements, and delayed primary closure on 1/1/2020  * Presented with 3 day history of progressive abdominal pain, nausea, and vomiting. En route, she was noted by EMS to vomit feculent material with associated aspiration, and was hypotensive on arrival. She was intubated in the ED, initiated on 4 pressor agents, and stress-dose steroids. CT chest/abd/pelvis on arrival showed RML infiltrate consistent with aspiration and evidence of perforated duodenum. She was initiated on IV pip-tazo, and taken emergent to the OR by General Surgery   * 12/29-12/30: s/p ex lap, TRUONG, Malecot placement in perforation, and TAC. Gastric and duodenal ischemia and hemorrhage noted, no obvious leak.  Blood was evacuated and drain placed.    * Returned to OR on 1/1 for repeat ex lap, TRUONG, closure of duodenal perf over Malecot, and GJ tube placement. Unable to completely repair perforation due to size. THAI drains placed for containment. Bowel ischemia improved on visualization  * Weaned off pressors on 1/5  - Post-operative management as per General Surgery  - On IV pip-tazo, see septic shock/aspiration pneumonia below  - On 7-day hydrocortisone taper (last day: 1/13)  - Remains NPO with sips of ice chips/clears. On tube feeds  - Continues on pantoprazole 40 mg PO daily   - Holding PTA aspirin due to PUD/GI hemorrhage    Acute blood loss anemia due to above  Baseline Hgb 16-19, decreased to 9-10 range from perforated ulcer and surgical losses.   - Hgb 9.0 today, will follow    Septic shock with Staph hominis bacteremia  Aspiration event noted en route to the ED. AMS, hypotension, leukocytosis (20.5k), elevated LFT present on arrival. 1 out of 2 blood cultures on admission grew Staph hominis, and arterial line on 1/2 also grew Staph hominis. This was removed upon return of blood cultures. She initially required 4 pressor agents and stress dose steroids which were gradually weaned; has been off pressors since 1/5.   She has been on the following anti-infective regimen:  * 12/29-1/2: IV vancomycin  * 12/30-1/6: IV meropenem  * 12/30-1/2: IV fluconazole for fungal prophylaxis  * 12/29-12/30, 1/7 to present: IV pip-tazo  - WBC improving. Continues on IV pip-tazo alone  - On 7-day hydrocortisone taper (last day: 1/13)  - Repeat blood culture on 1/4: NGTD    Acute hypoxic respiratory failure due to aspiration pneumonia, Improving  - Continues on IV pip-tazo as noted above  - Encourage incentive spirometry, flutter valve, RCAT as able  - On 1 lpm per NC, wean as tolerated     Elevated troponin due to suspected demand ischemia, initial concern for NSTEMI  Chronic systolic CHF due to stress cardiomyopathy  Initial troponin  on arrival was elevated to 19.4 and subsequently trended down. EKG showed RBBB with septal Q waves. TTE (12/29) showed LVEF 30-40% with global hypokinesis predominantly involving the apex. Clinical presentation was felt by Cardiology to be consistent with stress cardiomyopathy. She was initially treated with IV heparin, but this was discontinued within 24 hours    - Clinically appears compensated, euvolemic  - Consider repeat TTE and ischemia work-up once more clinically stable    - Holding aspirin due to duodenal ulcer  - Holding statin due to elevated LFTs  - Cardiology has signed off    DM2 with peripheral neuropathy  * A1c 7.1. PTA on metformin 500 mg BID.   * Treated with insulin gtt 12/31-1/9  - Start Lantus 25 units daily today, 1/9  - High SSI available    ROLANDO stage II, Improved  Creatinine up to 2.5 from baseline 0.7-0.8. Suspect pre-renal state due to shock. Renal function improved with aggressive fluid resuscitation and blood pressure management.  - Creatinine slightly increased to 1.11 today, will follow  - 500 mL 1/2 NS ordered today, 1/9    Elevated transaminases due to septic shock  AST 1044, ALT 1363 on arrival. Improved with resolution of septic shock  - LFTs improving       Chronic pain syndrome  * PTA methadone 20/10/20 mg TID, gabapentin 1600 mg TID, tizanidine 4 mg TID  - Continues on PTA methadone  - PTA gabapentin has been decreased to 800 mg TID in the setting of AMS and recent ROLANDO  - Holding PTA tizanidine  - Oxycodone, IV Dilaudid PRN      History of chronic cystitis s/p chronic suprapubic catheter  - SP catheter in place  - Holding PTA oxybutynin and pyridium.      Stage 2 pressure ulcer on left buttocks  Stage 1 pressure ulcers on sacrum and LLE  Present on admission  - Wound cares in place as per WOCN     Essential hypertension  * PTA: lisinopril 5 mg daily.    - Holding lisinopril in setting of recent shock     Hyperlipidemia  - Holding PTA simvastatin due to elevated LFTs         Hypothyroidism:  - Continues on PTA levothyroxine 88 mcg daily.         Mild intermittent asthma  PTA on albuterol, levalbuterol PRN  - Bronchodilators available PRN     History of ANIYA  Reportedly no longer on PAP therapy  - Currently on biPAP qhs        Morbid obesity  BMI 62    FEN: NPO, on tube feeds  Drains/Tubes: SP catheter (chronic), THAI drain x2, GJ tube (1/1)  Lines: RUE PICC, 1/5  DVT Prophylaxis: Enoxaparin (Lovenox) SQ  Code Status: Full Code    Disposition: Continue IMC status today. Expected discharge once cleared by General Surgery, PT/OT, 3-5+ days. May be a good candidate for LTACH    Lily Hassan    Interval History   No acute events overnight. Nods 'yes/no' inconsistently to questions.  updated over the phone.  - d/c insulin gtt and start SQ regimen  - creatinine slight increased: start 1/2 NS at 100 ml/h for 5 hours  - post-op management as per Gen Surgery    -Data reviewed today: I reviewed all new labs and imaging results over the last 24 hours. I personally reviewed no images or EKG's today.    Physical Exam   Temp: 99.6  F (37.6  C) Temp src: Axillary BP: 134/56 Pulse: 75 Heart Rate: 68 Resp: 15 SpO2: 98 % O2 Device: BiPAP/CPAP Oxygen Delivery: 1 LPM  Vitals:    01/05/20 0000 01/07/20 0034 01/08/20 0400   Weight: 135 kg (297 lb 9.9 oz) (!) 167 kg (368 lb 2.7 oz) (!) 168 kg (370 lb 6 oz)     Vital Signs with Ranges  Temp:  [98.7  F (37.1  C)-99.7  F (37.6  C)] 99.6  F (37.6  C)  Pulse:  [66-82] 75  Heart Rate:  [66-81] 68  Resp:  [9-17] 15  BP: ()/(48-69) 134/56  FiO2 (%):  [40 %] 40 %  SpO2:  [94 %-100 %] 98 %  I/O last 3 completed shifts:  In: 2777.33 [I.V.:737.33; NG/GT:780]  Out: 2430 [Urine:1785; Drains:645]    Constitutional: Resting comfortably, NAD  HEENT: Sclera white, MMM  Respiratory: Breathing non-labored. Lungs CTA anteriorly  Cardiovascular: Heart sounds distant, RRR. No pedal edema  GI: +BS, abd soft. Dressing over incision in place. THAI drains in place  with sero-sanginous fluid. GJ tube in place.    : SP catheter in place draining clear, yellow urine  Skin/Integument: Abdominal incision dressing in place  Musculoskeletal: Normal muscle bulk and tone  Neuro: Alert, arouses to voice. Nods yes/no inconsistently to questions, does not follow commands, TORRES  Psych: Calm, cooperative with exam    Medications     dextrose       - MEDICATION INSTRUCTIONS -       - MEDICATION INSTRUCTIONS -       ACE/ARB/ARNI NOT PRESCRIBED       BETA BLOCKER NOT PRESCRIBED       STATIN NOT PRESCRIBED       sodium chloride 20 mL/hr at 01/08/20 1000       enoxaparin ANTICOAGULANT  40 mg Subcutaneous Q24H     gabapentin  800 mg Oral or Feeding Tube TID     hydrocortisone  20 mg Oral Daily    Followed by     [START ON 1/11/2020] hydrocortisone  10 mg Oral Daily     insulin aspart  1-12 Units Subcutaneous Q4H     insulin glargine  25 Units Subcutaneous QAM AC     levothyroxine  88 mcg Oral or Feeding Tube QAM AC     methadone  10 mg Oral or Feeding Tube Q24H     methadone  20 mg Oral or Feeding Tube Q12H KENIA     pantoprazole  40 mg Oral or Feeding Tube QAM AC     piperacillin-tazobactam  4.5 g Intravenous Q6H     QUEtiapine  25 mg Oral or Feeding Tube At Bedtime     sodium chloride (PF)  10 mL Intracatheter Q8H     Data   Recent Labs   Lab 01/09/20  0620 01/08/20  0435 01/07/20  0350   WBC 17.5* 19.8* 22.6*   HGB 9.0* 9.6* 10.1*   MCV 92 90 90    260 292   * 144 143   POTASSIUM 3.9 4.0 3.9   CHLORIDE 111* 110* 110*   CO2 31 29 28   BUN 43* 38* 36*   CR 1.11* 0.99 0.91   ANIONGAP 4 5 5   DORY 8.8 8.5 8.3*   * 144* 125*   ALBUMIN 1.3* 1.3*  --    PROTTOTAL 4.5* 4.2*  --    BILITOTAL 0.6 0.8  --    ALKPHOS 95 83  --    ALT 74* 89*  --    AST 38 31  --        No results found for this or any previous visit (from the past 24 hour(s)).

## 2020-01-09 NOTE — PLAN OF CARE
SLP: Pt getting cleaned up at this time. Will continue to follow.     Discharge Planner SLP   Patient plan for discharge: Did not state   Current status: SLP: Sips of clear and ice chips ordered by MD. RN reported good tolerance throughout the day. Ice chips trialed with passive a/p propulsion. Improved oral control wtih thin liquids by teaspoon. Cough on 2/10 trials. No additional s/sx of aspiration and pt quickly fatigued.     Would continue sips of thin water and ice chips only after aggressive oral cares. Continue ST prior to advancing diet further.     Barriers to return to prior living situation: Dysphagia  Recommendations for discharge: TCU  Rationale for recommendations: Continue ST for swallowing goals       Entered by: Radha Pinzon 01/09/2020 3:12 PM

## 2020-01-09 NOTE — PLAN OF CARE
Lethargic, alert to self. VSS on BiPAP overnight. Tele: NSR. Midline incision dressing CDI. BS hypo. Denies pain. THAI x2 intact. Malecot with bile output. G-tube to gravity, no output. J-tube with tube feedings at goal, 60 ml/hr with Q4 100ml flushes. Suprapubic cath with adequate output. LS clear/diminished. PICC. Insulin gtt infusing. Mepilex to sacrum. On bariatric rotating mattress. Up with lift. Repo Q2.

## 2020-01-09 NOTE — PROGRESS NOTES
Pt somnolent throughout shift. Arouses to voice. More communicative today, able to answer with more than yes/no. Oriented to self and place.  Placed on 2 LPM nasal cannula all day, switched to BiPAP at 1600 per pt request. Abdominal drains WDL. TF advanced to goal. Abdominal pain treated with Oxy. Up in chair x1. Family updated.

## 2020-01-09 NOTE — PLAN OF CARE
"PT: Attempted to see pt this am. Pt now on station 33. Pt is in a single iso room. The room is very small, looking for optimal chair to fit in the room for OOB. Pt is lift dependent at this time. Dicussed with charge who is aware of the situation and will \"swap\" rooms as able. Currently no other single rooms available. Pt is more alert today, asking  for ice chips. Pt with MDs upon PT exit.  "

## 2020-01-09 NOTE — PLAN OF CARE
PT: Attempted to see patient for PT session, pt moving rooms and the appropriate chair was ordered for her today and is not present.

## 2020-01-09 NOTE — PROGRESS NOTES
Pt on Bipap overnight, skin protective barriers in place. Alarm set at 10. Will continue to follow.

## 2020-01-10 ENCOUNTER — APPOINTMENT (OUTPATIENT)
Dept: SPEECH THERAPY | Facility: CLINIC | Age: 65
DRG: 853 | End: 2020-01-10
Payer: MEDICARE

## 2020-01-10 ENCOUNTER — APPOINTMENT (OUTPATIENT)
Dept: PHYSICAL THERAPY | Facility: CLINIC | Age: 65
DRG: 853 | End: 2020-01-10
Payer: MEDICARE

## 2020-01-10 LAB
ALBUMIN SERPL-MCNC: 1.4 G/DL (ref 3.4–5)
ALP SERPL-CCNC: 97 U/L (ref 40–150)
ALT SERPL W P-5'-P-CCNC: 75 U/L (ref 0–50)
ANION GAP SERPL CALCULATED.3IONS-SCNC: 3 MMOL/L (ref 3–14)
AST SERPL W P-5'-P-CCNC: 43 U/L (ref 0–45)
BACTERIA SPEC CULT: NO GROWTH
BILIRUB SERPL-MCNC: 0.6 MG/DL (ref 0.2–1.3)
BUN SERPL-MCNC: 37 MG/DL (ref 7–30)
CALCIUM SERPL-MCNC: 8.5 MG/DL (ref 8.5–10.1)
CHLORIDE SERPL-SCNC: 112 MMOL/L (ref 94–109)
CO2 SERPL-SCNC: 32 MMOL/L (ref 20–32)
CREAT SERPL-MCNC: 1.05 MG/DL (ref 0.52–1.04)
ERYTHROCYTE [DISTWIDTH] IN BLOOD BY AUTOMATED COUNT: 16.9 % (ref 10–15)
GFR SERPL CREATININE-BSD FRML MDRD: 56 ML/MIN/{1.73_M2}
GLUCOSE BLDC GLUCOMTR-MCNC: 125 MG/DL (ref 70–99)
GLUCOSE BLDC GLUCOMTR-MCNC: 132 MG/DL (ref 70–99)
GLUCOSE BLDC GLUCOMTR-MCNC: 133 MG/DL (ref 70–99)
GLUCOSE BLDC GLUCOMTR-MCNC: 134 MG/DL (ref 70–99)
GLUCOSE BLDC GLUCOMTR-MCNC: 158 MG/DL (ref 70–99)
GLUCOSE BLDC GLUCOMTR-MCNC: 159 MG/DL (ref 70–99)
GLUCOSE SERPL-MCNC: 139 MG/DL (ref 70–99)
HCT VFR BLD AUTO: 31 % (ref 35–47)
HGB BLD-MCNC: 9.6 G/DL (ref 11.7–15.7)
Lab: NORMAL
MCH RBC QN AUTO: 28.5 PG (ref 26.5–33)
MCHC RBC AUTO-ENTMCNC: 31 G/DL (ref 31.5–36.5)
MCV RBC AUTO: 92 FL (ref 78–100)
PLATELET # BLD AUTO: 273 10E9/L (ref 150–450)
POTASSIUM SERPL-SCNC: 4.2 MMOL/L (ref 3.4–5.3)
PROT SERPL-MCNC: 4.5 G/DL (ref 6.8–8.8)
RBC # BLD AUTO: 3.37 10E12/L (ref 3.8–5.2)
SODIUM SERPL-SCNC: 147 MMOL/L (ref 133–144)
SPECIMEN SOURCE: NORMAL
WBC # BLD AUTO: 20.4 10E9/L (ref 4–11)

## 2020-01-10 PROCEDURE — 40000275 ZZH STATISTIC RCP TIME EA 10 MIN

## 2020-01-10 PROCEDURE — 97110 THERAPEUTIC EXERCISES: CPT | Mod: GP | Performed by: PHYSICAL THERAPIST

## 2020-01-10 PROCEDURE — 99232 SBSQ HOSP IP/OBS MODERATE 35: CPT | Performed by: INTERNAL MEDICINE

## 2020-01-10 PROCEDURE — 00000146 ZZHCL STATISTIC GLUCOSE BY METER IP

## 2020-01-10 PROCEDURE — 25000128 H RX IP 250 OP 636: Performed by: INTERNAL MEDICINE

## 2020-01-10 PROCEDURE — 80053 COMPREHEN METABOLIC PANEL: CPT | Performed by: INTERNAL MEDICINE

## 2020-01-10 PROCEDURE — 27210437 ZZH NUTRITION PRODUCT SEMIELEM INTERMED LITER

## 2020-01-10 PROCEDURE — 12000000 ZZH R&B MED SURG/OB

## 2020-01-10 PROCEDURE — 25000131 ZZH RX MED GY IP 250 OP 636 PS 637: Mod: GY | Performed by: INTERNAL MEDICINE

## 2020-01-10 PROCEDURE — 25000132 ZZH RX MED GY IP 250 OP 250 PS 637: Mod: GY | Performed by: INTERNAL MEDICINE

## 2020-01-10 PROCEDURE — 85027 COMPLETE CBC AUTOMATED: CPT | Performed by: INTERNAL MEDICINE

## 2020-01-10 PROCEDURE — 92526 ORAL FUNCTION THERAPY: CPT | Mod: GN | Performed by: SPEECH-LANGUAGE PATHOLOGIST

## 2020-01-10 PROCEDURE — 94660 CPAP INITIATION&MGMT: CPT

## 2020-01-10 PROCEDURE — 40000239 ZZH STATISTIC VAT ROUNDS

## 2020-01-10 PROCEDURE — 99207 ZZC CDG-MDM COMPONENT: MEETS MODERATE - UP CODED: CPT | Performed by: INTERNAL MEDICINE

## 2020-01-10 PROCEDURE — 97530 THERAPEUTIC ACTIVITIES: CPT | Mod: GP | Performed by: PHYSICAL THERAPIST

## 2020-01-10 RX ORDER — AMLODIPINE BESYLATE 5 MG/1
5 TABLET ORAL DAILY
Status: DISCONTINUED | OUTPATIENT
Start: 2020-01-10 | End: 2020-01-21 | Stop reason: HOSPADM

## 2020-01-10 RX ORDER — LABETALOL HYDROCHLORIDE 5 MG/ML
10 INJECTION, SOLUTION INTRAVENOUS
Status: DISCONTINUED | OUTPATIENT
Start: 2020-01-10 | End: 2020-01-18

## 2020-01-10 RX ORDER — QUETIAPINE FUMARATE 25 MG/1
25 TABLET, FILM COATED ORAL 3 TIMES DAILY PRN
Status: DISCONTINUED | OUTPATIENT
Start: 2020-01-10 | End: 2020-01-15

## 2020-01-10 RX ADMIN — HYDROCORTISONE 20 MG: 10 TABLET ORAL at 09:34

## 2020-01-10 RX ADMIN — GABAPENTIN 800 MG: 250 SOLUTION ORAL at 09:35

## 2020-01-10 RX ADMIN — PIPERACILLIN AND TAZOBACTAM 4.5 G: 4; .5 INJECTION, POWDER, FOR SOLUTION INTRAVENOUS at 14:20

## 2020-01-10 RX ADMIN — GABAPENTIN 800 MG: 250 SOLUTION ORAL at 15:53

## 2020-01-10 RX ADMIN — PIPERACILLIN AND TAZOBACTAM 4.5 G: 4; .5 INJECTION, POWDER, FOR SOLUTION INTRAVENOUS at 09:39

## 2020-01-10 RX ADMIN — AMLODIPINE BESYLATE 5 MG: 5 TABLET ORAL at 09:34

## 2020-01-10 RX ADMIN — LEVOTHYROXINE SODIUM 88 MCG: 88 TABLET ORAL at 07:37

## 2020-01-10 RX ADMIN — METHADONE HYDROCHLORIDE 10 MG: 10 TABLET ORAL at 14:20

## 2020-01-10 RX ADMIN — PIPERACILLIN AND TAZOBACTAM 4.5 G: 4; .5 INJECTION, POWDER, FOR SOLUTION INTRAVENOUS at 01:51

## 2020-01-10 RX ADMIN — METHADONE HYDROCHLORIDE 20 MG: 10 TABLET ORAL at 20:27

## 2020-01-10 RX ADMIN — INSULIN GLARGINE 25 UNITS: 100 INJECTION, SOLUTION SUBCUTANEOUS at 09:35

## 2020-01-10 RX ADMIN — Medication 40 MG: at 07:40

## 2020-01-10 RX ADMIN — METHADONE HYDROCHLORIDE 20 MG: 10 TABLET ORAL at 09:34

## 2020-01-10 RX ADMIN — PIPERACILLIN AND TAZOBACTAM 4.5 G: 4; .5 INJECTION, POWDER, FOR SOLUTION INTRAVENOUS at 20:26

## 2020-01-10 RX ADMIN — INSULIN ASPART 1 UNITS: 100 INJECTION, SOLUTION INTRAVENOUS; SUBCUTANEOUS at 15:47

## 2020-01-10 RX ADMIN — ENOXAPARIN SODIUM 40 MG: 40 INJECTION SUBCUTANEOUS at 15:45

## 2020-01-10 ASSESSMENT — ACTIVITIES OF DAILY LIVING (ADL)
ADLS_ACUITY_SCORE: 31

## 2020-01-10 NOTE — PLAN OF CARE
Discharge Planner SLP   Patient plan for discharge: not discussed  Current status: Patient seen for dysphagia treatment. RN reported no coughing with 2 sips of water prior to session. Patient frequently asking for water. Patient seated upright in bed and required total feeding assist due to weakness. Provided oral cares prior to PO trials. Patient consumed 2 small ice chips and 10 small 1/3 tsp size sips water by spoon. Note reduced oral bolus control and one instance of delayed cough after sip of water. No other overt aspiration signs occurred. Patient at increased risk for silent aspiration given recent intubation and level of weakness/deconditioning.     Recommend continue NPO status except for limited ice chips and/or sips of water only (8-10 per hour) given swallow strategies (1:1 RN assist, oral cares prior to intake, water by spoon in 1/3 tsp size amount, seated upright fully). Monitor for signs/symptoms of aspiration and hold ice chips/water if occur.    Barriers to return to prior living situation: Dysphagia  Recommendations for discharge: TCU  Rationale for recommendations: Continue ST for swallowing goals       Entered by: Thu Rasheed 01/10/2020 10:53 AM

## 2020-01-10 NOTE — PLAN OF CARE
Brother in law Sarmad called to check on sister in law. Says he is working with the [facility] to get help for Bhavani's  Sergio. He says  Sergio is lost with out her. He will bring him later. Will update care coordinator.

## 2020-01-10 NOTE — PLAN OF CARE
Pt lethargic, awakes to voice. Tmax 100.1, VSS used BIPAP overnight, on 1 liter/nc this morning, sats 94%, lungs diminished.dressing CDI drains patent. Pt turned and repositioned.  Tolerating tube feeding at 60 ml/hr. No stool tonight.

## 2020-01-10 NOTE — PROGRESS NOTES
[facility]    Hospitalist Progress Note    Date of Service (when I saw the patient): 01/10/2020    Assessment & Plan   Bhvaani White is a 64 year old female with hx of DM2, chronic cystitis s/p chronic SP catheter, chronic pain on methadone maintenance, asthma, ANIYA, and hypothyroidism who was admitted to the ICU service on 12/29/2019 for a perforated duodenal ulcer with associated septic shock and bowel ischemia. She is s/p ex-lap x2, but perforation could not be entirely repaired due to size; she is s/p THAI drains and GJ tube placement . Hospital course has otherwise been complicated by stress cardiomyopathy. She was weaned off pressors on 1/5 and successfully extubated on 1/6. She was transferred the the Hospitalist service on 1/6.     Acute metabolic encephalopathy, Improved  Multifactorial due to hemorrhagic/septic shock, ICU delirium. She was started on scheduled Seroquel while in the ICU which was discontinued with improvement in her mental status   - Currently alert and oriented, intermittently confused  - Seroquel 25 mg TID PRN for anxiety, agitation    Perforated duodenal ulcer with hemorrhagic shock and bowel ischemia, s/p ex lap, TRUONG, Malecot drain placement in perforation, and wound VAC on 12/29/2019, repeat ex-lap, TRUONG, closure of duodenal perforation over Malecot, G-tube/J-tube placements, and delayed primary closure on 1/1/2020  * Presented with 3 day history of progressive abdominal pain, nausea, and vomiting. En route, she was noted by EMS to vomit feculent material with associated aspiration, and was hypotensive on arrival. She was intubated in the ED, initiated on 4 pressor agents, and stress-dose steroids. CT chest/abd/pelvis on arrival showed RML infiltrate consistent with aspiration and evidence of perforated duodenum. She was initiated on IV pip-tazo, and taken emergent to the OR by General Surgery   * 12/29-12/30: s/p ex lap, TRUONG, Malecot placement in perforation, and TAC.  Gastric and duodenal ischemia and hemorrhage noted, no obvious leak. Blood was evacuated and drain placed.    * Returned to OR on 1/1 for repeat ex lap, TRUONG, closure of duodenal perf over Malecot, and GJ tube placement. Unable to completely repair perforation due to size. THAI drains placed for containment. Bowel ischemia improved on visualization  * Weaned off pressors on 1/5  - Post-operative management as per General Surgery: has THAI drains x2 in place  - On tube feeds. Remains NPO with sips of ice chips/clears. Advance as per Gen Surgery/SLP  - On IV pip-tazo, see septic shock/aspiration pneumonia below  - On 7-day hydrocortisone taper (last day: 1/13)  - Continues on pantoprazole 40 mg PO daily   - Holding PTA aspirin due to PUD/GI hemorrhage    Acute blood loss anemia due to above  Baseline Hgb 16-19, decreased to 9-10 range from perforated ulcer and surgical losses.   - Hgb stable in mid 9 range, will follow    Septic shock with Staph hominis bacteremia  Aspiration event noted en route to the ED. AMS, hypotension, leukocytosis (20.5k), elevated LFT present on arrival. 1 out of 2 blood cultures on admission grew Staph hominis, and arterial line on 1/2 also grew Staph hominis. This was removed upon return of blood cultures. She initially required 4 pressor agents and stress dose steroids which were gradually weaned; has been off pressors since 1/5.   She has been on the following anti-infective regimen:  * 12/29-1/2: IV vancomycin  * 12/30-1/6: IV meropenem  * 12/30-1/2: IV fluconazole for fungal prophylaxis  * 12/29-12/30, 1/7 to present: IV pip-tazo  - WBC increased today, but clinically improving. Will follow for now  - Continues on IV pip-tazo   - On 7-day hydrocortisone taper (last day: 1/13)  - Repeat blood culture on 1/4: NGTD    Acute hypoxic respiratory failure due to aspiration pneumonia, Improving  - Continues on IV pip-tazo as noted above  - Encourage incentive spirometry, flutter valve, RCAT as  able  - On 1 lpm per NC, wean as tolerated     Elevated troponin due to suspected demand ischemia, initial concern for NSTEMI  Chronic systolic CHF due to stress cardiomyopathy  Initial troponin on arrival was elevated to 19.4 and subsequently trended down. EKG showed RBBB with septal Q waves. TTE (12/29) showed LVEF 30-40% with global hypokinesis predominantly involving the apex. Clinical presentation was felt by Cardiology to be consistent with stress cardiomyopathy. She was initially treated with IV heparin, but this was discontinued within 24 hours    - Clinically appears compensated, euvolemic  - Consider repeat TTE and ischemia work-up once more clinically stable    - Holding aspirin due to duodenal ulcer  - Holding statin due to elevated LFTs  - Cardiology has signed off    DM2 with peripheral neuropathy  * A1c 7.1. PTA on metformin 500 mg BID.   * Treated with insulin gtt 12/31-1/9  - Currently on Lantus 25 units daily  - High SSI available  Recent Labs   Lab 01/10/20  0636 01/10/20  0550 01/10/20  0214 01/09/20  2203 01/09/20  1810 01/09/20  1424 01/09/20  1001  01/09/20  0620  01/08/20  0435  01/07/20  0350  01/06/20  0411  01/05/20  0510   GLC  --  139*  --   --   --   --   --   --  123*  --  144*  --  125*  --  137*  --  119*   *  --  132* 159* 149* 163* 129*   < >  --    < >  --    < >  --    < >  --    < >  --     < > = values in this interval not displayed.       ROLANDO stage II, Improved  Creatinine up to 2.5 from baseline 0.7-0.8. Suspect pre-renal state due to shock. Renal function improved with aggressive fluid resuscitation and blood pressure management.  - Creatinine 1.05, will follow    Elevated transaminases due to septic shock  AST 1044, ALT 1363 on arrival. Improved with resolution of septic shock  - LFTs appear to have plateaued: AST 70s, ALT 90s. Will follow periodically       Chronic pain syndrome  * PTA methadone 20/10/20 mg TID, gabapentin 1600 mg TID, tizanidine 4 mg TID  -  Continues on PTA methadone  - PTA gabapentin has been decreased to 800 mg TID in the setting of AMS and recent ROLANDO  - Holding PTA tizanidine  - Oxycodone, IV Dilaudid PRN      History of chronic cystitis s/p chronic suprapubic catheter  - SP catheter in place  - Holding PTA oxybutynin and pyridium.      Stage 2 pressure ulcer on left buttocks  Stage 1 pressure ulcers on sacrum and LLE  Present on admission  - Wound cares in place as per WOCN     Essential hypertension  * PTA: lisinopril 5 mg daily.    - Holding lisinopril in setting of recent shock and ROLANDO  - Start amlodipine 5 mg daily today, 1/10  - Labetalol PRN for SBP>175     Hyperlipidemia  - Holding PTA simvastatin due to elevated LFTs        Hypothyroidism:  - Continues on PTA levothyroxine 88 mcg daily.         Mild intermittent asthma  PTA on albuterol, levalbuterol PRN  - Bronchodilators available PRN     History of ANIYA  Reportedly no longer on PAP therapy  - Currently on biPAP qhs        Morbid obesity  BMI 62    FEN: NPO, on tube feeds  Drains/Tubes: SP catheter (chronic), THAI drain x2, G+J tubes (1/1)  Lines: RUE PICC, 1/5  DVT Prophylaxis: Enoxaparin (Lovenox) SQ  Code Status: Full Code    Disposition: d/c IMC status. Expected discharge once cleared by General Surgery, PT/OT, will be here through the weekend    Lily Ny Mo    Interval History   No events overnight. More alert and interactive today. Very very thirsty. Denies pain, shortness of breath, or nausea.   - Recommend increased free water flushes to Nutrition  - Start amlodipine for HTN  - post-op management as per Gen Surgery    -Data reviewed today: I reviewed all new labs and imaging results over the last 24 hours. I personally reviewed no images or EKG's today.    Physical Exam   Temp: 98.9  F (37.2  C)   BP: (!) 152/72 Pulse: 71 Heart Rate: 78 Resp: 18 SpO2: 96 % O2 Device: Nasal cannula Oxygen Delivery: 1 LPM  Vitals:    01/05/20 0000 01/07/20 0034 01/08/20 0400   Weight: 135 kg  (297 lb 9.9 oz) (!) 167 kg (368 lb 2.7 oz) (!) 168 kg (370 lb 6 oz)     Vital Signs with Ranges  Temp:  [98.9  F (37.2  C)-100.1  F (37.8  C)] 98.9  F (37.2  C)  Pulse:  [66-82] 71  Heart Rate:  [64-84] 78  Resp:  [9-20] 18  BP: (125-175)/(61-82) 152/72  SpO2:  [91 %-98 %] 96 %  I/O last 3 completed shifts:  In: -   Out: 3152 [Urine:2200; Emesis/NG output:150; Drains:802]    Constitutional: Resting comfortably, NAD  HEENT: Sclera white, MMM  Respiratory: Breathing non-labored. Lungs CTA anteriorly  Cardiovascular: Heart sounds distant, RRR. No pedal edema  GI: +BS, abd soft. Dressing over incision in place. THAI drains in place with purulent fluid. GJ tube in place.    : SP catheter in place draining clear, yellow urine  Skin/Integument: Abdominal incision dressing in place  Musculoskeletal: Normal muscle bulk and tone  Neuro: Alert and appropriate. TORRES  Psych: Calm, cooperative with exam    Medications     dextrose       - MEDICATION INSTRUCTIONS -       - MEDICATION INSTRUCTIONS -       ACE/ARB/ARNI NOT PRESCRIBED       BETA BLOCKER NOT PRESCRIBED       STATIN NOT PRESCRIBED       sodium chloride 20 mL/hr at 01/08/20 1000       amLODIPine  5 mg Oral or Feeding Tube Daily     enoxaparin ANTICOAGULANT  40 mg Subcutaneous Q24H     gabapentin  800 mg Oral or Feeding Tube TID     hydrocortisone  20 mg Oral Daily    Followed by     [START ON 1/11/2020] hydrocortisone  10 mg Oral Daily     insulin aspart  1-12 Units Subcutaneous Q4H     insulin glargine  25 Units Subcutaneous QAM AC     levothyroxine  88 mcg Oral or Feeding Tube QAM AC     methadone  10 mg Oral or Feeding Tube Q24H     methadone  20 mg Oral or Feeding Tube Q12H KENIA     pantoprazole  40 mg Oral or Feeding Tube QAM AC     piperacillin-tazobactam  4.5 g Intravenous Q6H     sodium chloride (PF)  10 mL Intracatheter Q8H     Data   Recent Labs   Lab 01/10/20  0550 01/09/20  0620 01/08/20  0435   WBC 20.4* 17.5* 19.8*   HGB 9.6* 9.0* 9.6*   MCV 92 92 90   PLT  273 279 260   * 146* 144   POTASSIUM 4.2 3.9 4.0   CHLORIDE 112* 111* 110*   CO2 32 31 29   BUN 37* 43* 38*   CR 1.05* 1.11* 0.99   ANIONGAP 3 4 5   DORY 8.5 8.8 8.5   * 123* 144*   ALBUMIN 1.4* 1.3* 1.3*   PROTTOTAL 4.5* 4.5* 4.2*   BILITOTAL 0.6 0.6 0.8   ALKPHOS 97 95 83   ALT 75* 74* 89*   AST 43 38 31       No results found for this or any previous visit (from the past 24 hour(s)).

## 2020-01-10 NOTE — PLAN OF CARE
Discharge Planner PT   Patient plan for discharge: none stated, MD note from 1/9/2020 states may be good candidate for LTACH  Current status: PT - pt now in 305 with ceiling lift and adequate room for equipment, has bariatric commode and chair. Pt cont to be very weak, less than antigravity strength in all extremities, R LE especially weak. Awake and alert, painful with all movement. Transferred bed to chair with repo-sheet/sling and ceiling lift to bariatric chair, pt able to participate in exercises with much assist. Up in chair with alarm in place, RN present, HUC to obtain bariatric cushion.  Barriers to return to prior living situation: Currently requires total assist/lift equipment for transfers  Recommendations for discharge: rehab facility, MD mentioned LTACH  Rationale for recommendations: Pt is medically complex with many drains and requiring much assist, recommend continued rehab at discharge to continue skilled PT to address mobility and strength deficits and allow eventual discharge to least restrictive living situation.        Entered by: Amanda Nichols 01/10/2020 2:09 PM

## 2020-01-10 NOTE — PROGRESS NOTES
Surgery    Patient seen and examined.   Showing some progress.  Following wbc.  Discussed PO intake with SLP - limit to small amounts of water.   Ok to have clear liquids. Stomach drained by G-Tube. Nutrition provided by TF via J-tube.   Agree with student note below.    Juan Boyle PA-C    Surgery    More alert and responsive than yesterday.  No current or new complaints, other than requesting water.  Denied feeling shortness of breath or difficulty breathing.  Still very weak with difficulty moving arms much higher than bending at elbows and slightly away from sides.   Able to get incentive spirometer to mouth and perform with some assistance.    Vital signs:  Temp: 98.9  F (37.2  C)   BP: (!) 152/72 Pulse: 71 Heart Rate: 78 Resp: 18 SpO2: 96 %    Weight: (!) 168 kg (370 lb 6 oz)    Exam:  Abdomen: Soft, non-distented. Tubes remain in place with appropriate volume and character of output. Dressings clean and in place.  Extremities: Weak  strength bilateral. Able to flex elbows ~ degrees and flex/abduction arms slightly away from sides. Weak lower extremity movement without edema or pain to palpation.     Labs:  WBC up from yesterday (17.5) to 20.4 today.  Hgb up slightly from yesterday (9.0) to 9.6 today.    I/O today: Net -3,152    Assessment/Plan:  Perforated duodenal ulcer with hemorrhagic shock and bowel ischemia, s/p ext lap, TRUONG, Malecot drain placement with perforation, and wound VAC on 12/29/2019. Repeat ex-lap, TRUONG, closure of duodenal perforation over Malecot, G-tube/J-tube placements, delayed primary closure on 1/1/2020.     - Water sips and ice chips   - Monitor drains   - PT/OT for strengthening   - Patient to continue to move arms and  hands on own for strengthening   - Patient to continue to work towards incentive spirometry on own   - Continue tube feedings    ALIZA DubonS

## 2020-01-10 NOTE — PLAN OF CARE
Weak upper and lower extremities unable lo hold anything in hands unable to move legs. Moves toes. Total ceiling lift with assist of three.  Up in chair 2 hours Drains intact except leaking at gastric drain to gravity at site, with movement. Forgetful to time. Inspirometer use every hour with full assistance to hold and preform   inspirometer almost to 500cc. Incontinent bm hard stool. Small abrasions to buttock covered  with mepeplex. Off IMC. O 2 at  2 liters all shift sats 90-92% denies pain until repositioned then constant cries in pain.

## 2020-01-11 ENCOUNTER — APPOINTMENT (OUTPATIENT)
Dept: CT IMAGING | Facility: CLINIC | Age: 65
DRG: 853 | End: 2020-01-11
Payer: MEDICARE

## 2020-01-11 ENCOUNTER — APPOINTMENT (OUTPATIENT)
Dept: SPEECH THERAPY | Facility: CLINIC | Age: 65
DRG: 853 | End: 2020-01-11
Payer: MEDICARE

## 2020-01-11 LAB
ANION GAP SERPL CALCULATED.3IONS-SCNC: 2 MMOL/L (ref 3–14)
BUN SERPL-MCNC: 37 MG/DL (ref 7–30)
CALCIUM SERPL-MCNC: 9.1 MG/DL (ref 8.5–10.1)
CHLORIDE SERPL-SCNC: 107 MMOL/L (ref 94–109)
CO2 SERPL-SCNC: 33 MMOL/L (ref 20–32)
CREAT SERPL-MCNC: 0.98 MG/DL (ref 0.52–1.04)
ERYTHROCYTE [DISTWIDTH] IN BLOOD BY AUTOMATED COUNT: 16.3 % (ref 10–15)
GFR SERPL CREATININE-BSD FRML MDRD: 62 ML/MIN/{1.73_M2}
GLUCOSE BLDC GLUCOMTR-MCNC: 127 MG/DL (ref 70–99)
GLUCOSE BLDC GLUCOMTR-MCNC: 134 MG/DL (ref 70–99)
GLUCOSE BLDC GLUCOMTR-MCNC: 135 MG/DL (ref 70–99)
GLUCOSE BLDC GLUCOMTR-MCNC: 140 MG/DL (ref 70–99)
GLUCOSE BLDC GLUCOMTR-MCNC: 142 MG/DL (ref 70–99)
GLUCOSE SERPL-MCNC: 167 MG/DL (ref 70–99)
HCT VFR BLD AUTO: 33 % (ref 35–47)
HGB BLD-MCNC: 10.4 G/DL (ref 11.7–15.7)
MCH RBC QN AUTO: 28.5 PG (ref 26.5–33)
MCHC RBC AUTO-ENTMCNC: 31.5 G/DL (ref 31.5–36.5)
MCV RBC AUTO: 90 FL (ref 78–100)
PLATELET # BLD AUTO: 284 10E9/L (ref 150–450)
POTASSIUM SERPL-SCNC: 4 MMOL/L (ref 3.4–5.3)
RBC # BLD AUTO: 3.65 10E12/L (ref 3.8–5.2)
SODIUM SERPL-SCNC: 142 MMOL/L (ref 133–144)
TRIGL SERPL-MCNC: 193 MG/DL
WBC # BLD AUTO: 20.7 10E9/L (ref 4–11)

## 2020-01-11 PROCEDURE — 40000239 ZZH STATISTIC VAT ROUNDS

## 2020-01-11 PROCEDURE — 25000128 H RX IP 250 OP 636: Performed by: INTERNAL MEDICINE

## 2020-01-11 PROCEDURE — 93010 ELECTROCARDIOGRAM REPORT: CPT | Performed by: INTERNAL MEDICINE

## 2020-01-11 PROCEDURE — 27210437 ZZH NUTRITION PRODUCT SEMIELEM INTERMED LITER

## 2020-01-11 PROCEDURE — 99233 SBSQ HOSP IP/OBS HIGH 50: CPT | Performed by: INTERNAL MEDICINE

## 2020-01-11 PROCEDURE — 94640 AIRWAY INHALATION TREATMENT: CPT | Mod: 76

## 2020-01-11 PROCEDURE — 25000125 ZZHC RX 250: Performed by: INTERNAL MEDICINE

## 2020-01-11 PROCEDURE — 80048 BASIC METABOLIC PNL TOTAL CA: CPT | Performed by: INTERNAL MEDICINE

## 2020-01-11 PROCEDURE — 92526 ORAL FUNCTION THERAPY: CPT | Mod: GN | Performed by: SPEECH-LANGUAGE PATHOLOGIST

## 2020-01-11 PROCEDURE — 94640 AIRWAY INHALATION TREATMENT: CPT

## 2020-01-11 PROCEDURE — 40000275 ZZH STATISTIC RCP TIME EA 10 MIN

## 2020-01-11 PROCEDURE — 74177 CT ABD & PELVIS W/CONTRAST: CPT

## 2020-01-11 PROCEDURE — 85027 COMPLETE CBC AUTOMATED: CPT | Performed by: INTERNAL MEDICINE

## 2020-01-11 PROCEDURE — 84478 ASSAY OF TRIGLYCERIDES: CPT | Performed by: INTERNAL MEDICINE

## 2020-01-11 PROCEDURE — 25000131 ZZH RX MED GY IP 250 OP 636 PS 637: Mod: GY | Performed by: INTERNAL MEDICINE

## 2020-01-11 PROCEDURE — 25000132 ZZH RX MED GY IP 250 OP 250 PS 637: Mod: GY | Performed by: INTERNAL MEDICINE

## 2020-01-11 PROCEDURE — 00000146 ZZHCL STATISTIC GLUCOSE BY METER IP

## 2020-01-11 PROCEDURE — 93005 ELECTROCARDIOGRAM TRACING: CPT

## 2020-01-11 PROCEDURE — 12000000 ZZH R&B MED SURG/OB

## 2020-01-11 RX ORDER — ALBUTEROL SULFATE 0.83 MG/ML
2.5 SOLUTION RESPIRATORY (INHALATION)
Status: DISCONTINUED | OUTPATIENT
Start: 2020-01-11 | End: 2020-01-21 | Stop reason: HOSPADM

## 2020-01-11 RX ORDER — ACETYLCYSTEINE 200 MG/ML
2 SOLUTION ORAL; RESPIRATORY (INHALATION) EVERY 4 HOURS
Status: DISCONTINUED | OUTPATIENT
Start: 2020-01-11 | End: 2020-01-15

## 2020-01-11 RX ORDER — IOPAMIDOL 755 MG/ML
135 INJECTION, SOLUTION INTRAVASCULAR ONCE
Status: COMPLETED | OUTPATIENT
Start: 2020-01-11 | End: 2020-01-11

## 2020-01-11 RX ADMIN — IOPAMIDOL 135 ML: 755 INJECTION, SOLUTION INTRAVENOUS at 13:02

## 2020-01-11 RX ADMIN — INSULIN GLARGINE 25 UNITS: 100 INJECTION, SOLUTION SUBCUTANEOUS at 08:50

## 2020-01-11 RX ADMIN — PIPERACILLIN AND TAZOBACTAM 4.5 G: 4; .5 INJECTION, POWDER, FOR SOLUTION INTRAVENOUS at 14:08

## 2020-01-11 RX ADMIN — INSULIN ASPART 1 UNITS: 100 INJECTION, SOLUTION INTRAVENOUS; SUBCUTANEOUS at 16:03

## 2020-01-11 RX ADMIN — ENOXAPARIN SODIUM 40 MG: 40 INJECTION SUBCUTANEOUS at 15:55

## 2020-01-11 RX ADMIN — GABAPENTIN 800 MG: 250 SOLUTION ORAL at 09:04

## 2020-01-11 RX ADMIN — INSULIN ASPART 2 UNITS: 100 INJECTION, SOLUTION INTRAVENOUS; SUBCUTANEOUS at 08:50

## 2020-01-11 RX ADMIN — SODIUM CHLORIDE 81 ML: 9 INJECTION, SOLUTION INTRAVENOUS at 13:02

## 2020-01-11 RX ADMIN — METHADONE HYDROCHLORIDE 10 MG: 10 TABLET ORAL at 14:02

## 2020-01-11 RX ADMIN — PIPERACILLIN AND TAZOBACTAM 4.5 G: 4; .5 INJECTION, POWDER, FOR SOLUTION INTRAVENOUS at 08:45

## 2020-01-11 RX ADMIN — METHADONE HYDROCHLORIDE 20 MG: 10 TABLET ORAL at 20:52

## 2020-01-11 RX ADMIN — ALBUTEROL SULFATE 2.5 MG: 2.5 SOLUTION RESPIRATORY (INHALATION) at 19:17

## 2020-01-11 RX ADMIN — METHADONE HYDROCHLORIDE 20 MG: 10 TABLET ORAL at 08:55

## 2020-01-11 RX ADMIN — HYDROCORTISONE 10 MG: 10 TABLET ORAL at 08:55

## 2020-01-11 RX ADMIN — AMLODIPINE BESYLATE 5 MG: 5 TABLET ORAL at 08:55

## 2020-01-11 RX ADMIN — ACETYLCYSTEINE 4 ML: 200 SOLUTION ORAL; RESPIRATORY (INHALATION) at 19:17

## 2020-01-11 RX ADMIN — LEVOTHYROXINE SODIUM 88 MCG: 88 TABLET ORAL at 06:16

## 2020-01-11 RX ADMIN — GABAPENTIN 800 MG: 250 SOLUTION ORAL at 22:18

## 2020-01-11 RX ADMIN — ALBUTEROL SULFATE 2.5 MG: 2.5 SOLUTION RESPIRATORY (INHALATION) at 23:56

## 2020-01-11 RX ADMIN — Medication 40 MG: at 08:56

## 2020-01-11 RX ADMIN — GABAPENTIN 800 MG: 250 SOLUTION ORAL at 15:55

## 2020-01-11 RX ADMIN — PIPERACILLIN AND TAZOBACTAM 4.5 G: 4; .5 INJECTION, POWDER, FOR SOLUTION INTRAVENOUS at 02:35

## 2020-01-11 RX ADMIN — PIPERACILLIN AND TAZOBACTAM 4.5 G: 4; .5 INJECTION, POWDER, FOR SOLUTION INTRAVENOUS at 20:52

## 2020-01-11 RX ADMIN — ACETYLCYSTEINE 2 ML: 200 SOLUTION ORAL; RESPIRATORY (INHALATION) at 23:57

## 2020-01-11 RX ADMIN — INSULIN ASPART 1 UNITS: 100 INJECTION, SOLUTION INTRAVENOUS; SUBCUTANEOUS at 04:49

## 2020-01-11 ASSESSMENT — ACTIVITIES OF DAILY LIVING (ADL)
ADLS_ACUITY_SCORE: 30
ADLS_ACUITY_SCORE: 31

## 2020-01-11 NOTE — PLAN OF CARE
Was on bedpan to try to have a bm. Very difficult placement to bm. Co continually of abdominal pain with any movement  unable have a bm on her own.  Stool needed to be  manually removed.  7 golf ball size stool removed. After off bedpan lyric in low 8os. On 2 l nasal cannula dysphoretic  cool clammy bp stable 150/ 80 hear rate 115. ekg done  Sinus rhythm   put on re breather at 10 liters. sats up to 92% Dr florence here and surgery aware of incident.

## 2020-01-11 NOTE — PLAN OF CARE
:O2 at 2 liters after CT scan. Inspirometer. To almost 500. Able to moves extremities a very minimal amount to command. All drains functioning except. Gonzalo to R does not hold suction. Taking ice chips with coughing. Vss  afebrile.

## 2020-01-11 NOTE — PROGRESS NOTES
General Surgery Progress Note    Subjective:  Tolerating sips and chips w/o nausea. BM yesterday. Up to chair yesterday. Discussed importance of IS.     Objective:  BP (!) 154/78 (BP Location: Left arm)   Pulse 90   Temp 98.6  F (37  C) (Oral)   Resp 18   Wt (!) 168 kg (370 lb 6 oz)   SpO2 (!) 89%   BMI 62.59 kg/m    General: Patient resting comfortably in bed, NAD.   Neuro: Alert and answering questions appropriately.   HEENT: NCAT.  CV: regular rate.  Pulm: breathing non labored ORA  GI: Obese abdomen, soft, moderate tenderness on left, mild on right. Dressings c/d/I. Malecot drain w/ bilious output. L THAI serous, R THAI mildly serosanguinous.  Ext: No peripheral edema BLE.     Labs: reviewed, persistent leukocytosis    Imaging: no new imaging    Assessment and Plan:  Bhavani White is a morbidly obese 63 yo F w/ PMH of DM, ANIYA, and hypothyroidism, who was admitted to [facility] on 12/29/20 w/ perforated duodenal ulcer. Likely aspiration during transfer, intubated in ED. S/p ex lap, TRUONG, Malecot placement in perforation, and TAC on 12/29. S/p ex lap, TRUONG, closure of duodenal perf over Malecot, G tube, jejunostomy, and delayed primary closure on 1/01. Tolerating TFs, afebrile but persistent leukocytosis on abx, drains unremarkable. Leukocytosis is in setting of steroids, but patient high risk for undrained intra-abdominal collection and need to r/o.     -OK for sips and chips  -continue TFs  -monitor drain outputs  -monitor electrolytes and CBC  -continue abx  -PT/OT  -IS  -chemical DVT ppx  -prn pain control  -steroid taper  -appreciate medicine assistance  -CT AP w/ IV and PO (down jejunostomy) contrast today    Clement Decker MD  General Surgery PGY-4  Pager: [phone]

## 2020-01-11 NOTE — PLAN OF CARE
A&O x4. VSS on 1L. Up with lift. NPO ex small sips of water, pt tolerating. TKO running at 10 ml/hr w/intermittent abx. Continuous tube feed at 60 ml/hr.  Dressing CDI. Suprapubic catheter with good output. 2 TAHI with minimal drainage. 2 other abdominal drains with good output.  Pain controlled with Methadone, no complaints of pain. Plan to work with therapies, and monitor GI function. Continue to monitor.

## 2020-01-11 NOTE — PLAN OF CARE
Discharge Planner SLP   Patient plan for discharge: Not addressed.   Current status: Patient seen for swallow treatment at bedside. Oral cares completed prior to ice chip/teaspoon amounts of water. Suspect premature entry and reduced laryngeal elevation. No immediate or delayed Sx of aspiration, but can not rule out penetration/silent aspiration given weak cough response for airway protection.   Recommend: 1. Continue NPO with TF. May have small amounts of ice chips/teaspoon of water after good oral hygiene. Hold if coughing or decrease in respiratory status. Trials of advancement per surgery ok.   Barriers to return to prior living situation: acuity of her illness.   Recommendations for discharge: TCU  Rationale for recommendations: Will need on going ST needs for dysphagia management and diet advancement as tolerated. Patient is well below her baseline.        Entered by: Kimberly Lakhani 01/11/2020 4:12 PM

## 2020-01-11 NOTE — PROGRESS NOTES
Hospitalist update note    Patient developed respiratory distress and increased O2 requirements from 1 to 5L after repositioning this morning. CT showed complete atelectasis of the right lung and small to moderate right pleural effusion. Patient unable to participate in pulm toilet due to encephalopathy, inability to follow commands consistently, and profound weakness.     Discussed care with Intensivist who recommended trial of nebs and RT assistance with pulm toilet.    - Start albuterol nebs q4h while awake  - Start mucomyst nebs  - Start percussion therapy     Certainly, if patient fails above treatments and continues to decompensate from a respiratory standpoint, she can be transferred to the ICU where they already know about her.    SILVIA Hassan MD  Hospitalist  [phone]

## 2020-01-11 NOTE — PROGRESS NOTES
[facility]    Hospitalist Progress Note    Date of Service (when I saw the patient): 01/11/2020    Assessment & Plan   Bhavani White is a 64 year old female with hx of DM2, chronic cystitis s/p chronic SP catheter, chronic pain on methadone maintenance, asthma, ANIYA, and hypothyroidism who was admitted to the ICU service on 12/29/2019 for a perforated duodenal ulcer with associated septic shock and bowel ischemia. She is s/p ex-lap x2, but perforation could not be entirely repaired due to size; she is s/p THAI drains and GJ tube placement . Hospital course has otherwise been complicated by stress cardiomyopathy. She was weaned off pressors on 1/5 and successfully extubated on 1/6. She was transferred the the Hospitalist service on 1/6.     Acute metabolic encephalopathy, Improved  Multifactorial due to hemorrhagic/septic shock, ICU delirium. She was started on scheduled Seroquel while in the ICU which was discontinued with improvement in her mental status   - Currently alert and oriented, intermittently confused  - Seroquel 25 mg TID PRN for anxiety, agitation    Perforated duodenal ulcer with hemorrhagic shock and bowel ischemia, s/p ex lap, TRUONG, Malecot drain placement in perforation, and wound VAC on 12/29/2019, repeat ex-lap, TRUONG, closure of duodenal perforation over Malecot, G-tube/J-tube placements, and delayed primary closure on 1/1/2020  * Presented with 3 day history of progressive abdominal pain, nausea, and vomiting. En route, she was noted by EMS to vomit feculent material with associated aspiration, and was hypotensive on arrival. She was intubated in the ED, initiated on 4 pressor agents, and stress-dose steroids. CT chest/abd/pelvis on arrival showed RML infiltrate consistent with aspiration and evidence of perforated duodenum. She was initiated on IV pip-tazo, and taken emergent to the OR by General Surgery   * 12/29-12/30: s/p ex lap, TRUONG, Malecot placement in perforation, and TAC.  Gastric and duodenal ischemia and hemorrhage noted, no obvious leak. Blood was evacuated and drain placed.    * Returned to OR on 1/1 for repeat ex lap, TRUONG, closure of duodenal perf over Malecot, and GJ tube placement. Unable to completely repair perforation due to size. THAI drains placed for containment. Bowel ischemia improved on visualization  * Weaned off pressors on 1/5  - CT abd/pelvis ordered by General Surgery today, 1/11  - Post-operative management as per General Surgery: has THAI drains x2 in place  - On tube feeds. Remains NPO with sips of ice chips/clears. Advance as per Gen Surgery/SLP  - Continues IV pip-tazo, duration pending results of CT abd/pelvis   - On 7-day hydrocortisone taper (last day: 1/13)  - Continues on pantoprazole 40 mg PO daily   - Holding PTA aspirin due to PUD/GI hemorrhage    Acute blood loss anemia due to above  Baseline Hgb 16-19, decreased to 9-10 range from perforated ulcer and surgical losses.   - Hgb 10.4, will follow    Septic shock with Staph hominis bacteremia  Aspiration event noted en route to the ED. AMS, hypotension, leukocytosis (20.5k), elevated LFT present on arrival. 1 out of 2 blood cultures on admission grew Staph hominis, and arterial line on 1/2 also grew Staph hominis. This was removed upon return of blood cultures. She initially required 4 pressor agents and stress dose steroids which were gradually weaned; has been off pressors since 1/5.   She has been on the following anti-infective regimen:  * 12/29-1/2: IV vancomycin  * 12/30-1/6: IV meropenem  * 12/30-1/2: IV fluconazole for fungal prophylaxis  * 12/29-12/30, 1/7 to present: IV pip-tazo  - WBC increased. Repeat CT abd/pelvis ordered by Gen Surgery  - Continues on IV pip-tazo, duration pending results of CT abd/pelvis  - On 7-day hydrocortisone taper (last day: 1/13)  - Repeat blood culture on 1/4: NGTD    Acute hypoxic respiratory failure due to aspiration pneumonia, Improving  - Continues on IV pip-tazo  as noted above  - Encourage incentive spirometry, flutter valve, RCAT as able  - On 1-2 lpm per NC, wean as tolerated     Elevated troponin due to suspected demand ischemia, initial concern for NSTEMI  Chronic systolic CHF due to stress cardiomyopathy  Initial troponin on arrival was elevated to 19.4 and subsequently trended down. EKG showed RBBB with septal Q waves. TTE (12/29) showed LVEF 30-40% with global hypokinesis predominantly involving the apex. Clinical presentation was felt by Cardiology to be consistent with stress cardiomyopathy. She was initially treated with IV heparin, but this was discontinued within 24 hours    - Clinically appears compensated, euvolemic  - Consider repeat TTE and ischemia work-up once more clinically stable    - Holding aspirin due to duodenal ulcer  - Holding statin due to elevated LFTs  - Cardiology has signed off    DM2 with peripheral neuropathy  * A1c 7.1. PTA on metformin 500 mg BID.   * Treated with insulin gtt 12/31-1/9  - Currently on Lantus 25 units daily  - High SSI available  Recent Labs   Lab 01/11/20  1212 01/11/20  0600 01/11/20  0410 01/11/20  0004 01/10/20  2023 01/10/20  1542 01/10/20  1106  01/10/20  0550  01/09/20  0620  01/08/20  0435  01/07/20  0350  01/06/20  0411   GLC  --  167*  --   --   --   --   --   --  139*  --  123*  --  144*  --  125*  --  137*   *  --  142* 135* 134* 158* 133*   < >  --    < >  --    < >  --    < >  --    < >  --     < > = values in this interval not displayed.       ROLANDO stage II, Improved  Creatinine up to 2.5 from baseline 0.7-0.8. Suspect pre-renal state due to shock. Renal function improved with aggressive fluid resuscitation and blood pressure management.  - Creatinine 0.98, will follow    Elevated transaminases due to septic shock  AST 1044, ALT 1363 on arrival. Improved with resolution of septic shock  - LFTs appear to have plateaued: AST 70s, ALT 90s. Will follow periodically       Chronic pain syndrome  * PTA  methadone 20/10/20 mg TID, gabapentin 1600 mg TID, tizanidine 4 mg TID  - Continues on PTA methadone  - PTA gabapentin has been decreased to 800 mg TID in the setting of AMS and recent ROLANDO  - Holding PTA tizanidine  - Oxycodone, IV Dilaudid PRN      History of chronic cystitis s/p chronic suprapubic catheter  - SP catheter in place  - Holding PTA oxybutynin and pyridium.      Stage 2 pressure ulcer on left buttocks  Stage 1 pressure ulcers on sacrum and LLE  Present on admission  - Wound cares in place as per WOCN     Essential hypertension  * PTA: lisinopril 5 mg daily.    - Holding lisinopril in setting of recent shock and ROLANDO  - She has been started on amlodipine 5 mg daily, will follow blood pressure and titrate accordingly  - Labetalol PRN for SBP>175     Hyperlipidemia  - Holding PTA simvastatin due to elevated LFTs        Hypothyroidism:  - Continues on PTA levothyroxine 88 mcg daily.         Mild intermittent asthma  PTA on albuterol, levalbuterol PRN  - Bronchodilators available PRN     History of ANIYA  Reportedly no longer on PAP therapy  - Currently on biPAP qhs        Morbid obesity  BMI 62    FEN: NPO, on tube feeds  Drains/Tubes: SP catheter (chronic), THAI drain x2, G+J tubes (1/1)  Lines: RUE PICC, 1/5  DVT Prophylaxis: Enoxaparin (Lovenox) SQ  Code Status: Full Code    Disposition: Expected discharge once cleared by General Surgery, able to work with PT/OT, mid to late next week at the earliest. Will need TCU    Lily Hassan    Interval History   Had large bowel movement this morning and then became acutely hypoxic, but has since recovered. Reports abdominal pain and thirst.   - CT abd/pelvis ordered by Gen Surgery  - Continue Zosyn for now    -Data reviewed today: I reviewed all new labs and imaging results over the last 24 hours. I personally reviewed no images or EKG's today.    Physical Exam   Temp: 98.7  F (37.1  C) Temp src: Oral BP: (!) 149/73 Pulse: 76 Heart Rate: 76 Resp: 18 SpO2: 96 %  O2 Device: Nasal cannula Oxygen Delivery: 1.5 LPM  Vitals:    01/05/20 0000 01/07/20 0034 01/08/20 0400   Weight: 135 kg (297 lb 9.9 oz) (!) 167 kg (368 lb 2.7 oz) (!) 168 kg (370 lb 6 oz)     Vital Signs with Ranges  Temp:  [98.3  F (36.8  C)-100.1  F (37.8  C)] 98.7  F (37.1  C)  Pulse:  [74-90] 76  Heart Rate:  [73-90] 76  Resp:  [16-20] 18  BP: (141-156)/(62-83) 149/73  SpO2:  [89 %-98 %] 96 %  I/O last 3 completed shifts:  In: 50 [P.O.:50]  Out: 3620 [Urine:3000; Emesis/NG output:350; Drains:270]    Constitutional: In mild distress due to pain and thirst  HEENT: Sclera white, MM dry  Respiratory: Breathing non-labored. Lungs CTA anteriorly  Cardiovascular: Heart sounds distant, RRR. No pedal edema  GI: +BS, abd soft. Dressing over incision in place. THAI drains in place with purulent fluid. G-J tubes in place.    : SP catheter in place draining clear, yellow urine  Skin/Integument: Abdominal incision dressing in place  Musculoskeletal: Normal muscle bulk and tone  Neuro: Alert and appropriate. TORRES  Psych: Calm, cooperative with exam    Medications     dextrose       - MEDICATION INSTRUCTIONS -       - MEDICATION INSTRUCTIONS -       ACE/ARB/ARNI NOT PRESCRIBED       BETA BLOCKER NOT PRESCRIBED       STATIN NOT PRESCRIBED       sodium chloride 20 mL/hr at 01/08/20 1000       amLODIPine  5 mg Oral or Feeding Tube Daily     enoxaparin ANTICOAGULANT  40 mg Subcutaneous Q24H     gabapentin  800 mg Oral or Feeding Tube TID     hydrocortisone  10 mg Oral Daily     insulin aspart  1-12 Units Subcutaneous Q4H     insulin glargine  25 Units Subcutaneous QAM AC     levothyroxine  88 mcg Oral or Feeding Tube QAM AC     methadone  10 mg Oral or Feeding Tube Q24H     methadone  20 mg Oral or Feeding Tube Q12H KENIA     pantoprazole  40 mg Oral or Feeding Tube QAM AC     piperacillin-tazobactam  4.5 g Intravenous Q6H     sodium chloride (PF)  10 mL Intracatheter Q8H     Data   Recent Labs   Lab 01/11/20  0600 01/10/20  0550  01/09/20  0620   WBC 20.7* 20.4* 17.5*   HGB 10.4* 9.6* 9.0*   MCV 90 92 92    273 279    147* 146*   POTASSIUM 4.0 4.2 3.9   CHLORIDE 107 112* 111*   CO2 33* 32 31   BUN 37* 37* 43*   CR 0.98 1.05* 1.11*   ANIONGAP 2* 3 4   DORY 9.1 8.5 8.8   * 139* 123*   ALBUMIN  --  1.4* 1.3*   PROTTOTAL  --  4.5* 4.5*   BILITOTAL  --  0.6 0.6   ALKPHOS  --  97 95   ALT  --  75* 74*   AST  --  43 38       No results found for this or any previous visit (from the past 24 hour(s)).

## 2020-01-12 ENCOUNTER — APPOINTMENT (OUTPATIENT)
Dept: SPEECH THERAPY | Facility: CLINIC | Age: 65
DRG: 853 | End: 2020-01-12
Payer: MEDICARE

## 2020-01-12 LAB
ANION GAP SERPL CALCULATED.3IONS-SCNC: 5 MMOL/L (ref 3–14)
BUN SERPL-MCNC: 37 MG/DL (ref 7–30)
CALCIUM SERPL-MCNC: 9.2 MG/DL (ref 8.5–10.1)
CHLORIDE SERPL-SCNC: 103 MMOL/L (ref 94–109)
CO2 SERPL-SCNC: 31 MMOL/L (ref 20–32)
CREAT SERPL-MCNC: 0.9 MG/DL (ref 0.52–1.04)
ERYTHROCYTE [DISTWIDTH] IN BLOOD BY AUTOMATED COUNT: 16.3 % (ref 10–15)
GFR SERPL CREATININE-BSD FRML MDRD: 68 ML/MIN/{1.73_M2}
GLUCOSE BLDC GLUCOMTR-MCNC: 121 MG/DL (ref 70–99)
GLUCOSE BLDC GLUCOMTR-MCNC: 148 MG/DL (ref 70–99)
GLUCOSE BLDC GLUCOMTR-MCNC: 96 MG/DL (ref 70–99)
GLUCOSE BLDC GLUCOMTR-MCNC: 98 MG/DL (ref 70–99)
GLUCOSE SERPL-MCNC: 146 MG/DL (ref 70–99)
HCT VFR BLD AUTO: 32.1 % (ref 35–47)
HGB BLD-MCNC: 9.6 G/DL (ref 11.7–15.7)
LACTATE BLD-SCNC: 0.8 MMOL/L (ref 0.7–2)
MCH RBC QN AUTO: 27.2 PG (ref 26.5–33)
MCHC RBC AUTO-ENTMCNC: 29.9 G/DL (ref 31.5–36.5)
MCV RBC AUTO: 91 FL (ref 78–100)
PLATELET # BLD AUTO: 256 10E9/L (ref 150–450)
POTASSIUM SERPL-SCNC: 4.1 MMOL/L (ref 3.4–5.3)
RBC # BLD AUTO: 3.53 10E12/L (ref 3.8–5.2)
SODIUM SERPL-SCNC: 139 MMOL/L (ref 133–144)
WBC # BLD AUTO: 24.8 10E9/L (ref 4–11)

## 2020-01-12 PROCEDURE — 85027 COMPLETE CBC AUTOMATED: CPT | Performed by: INTERNAL MEDICINE

## 2020-01-12 PROCEDURE — 94640 AIRWAY INHALATION TREATMENT: CPT

## 2020-01-12 PROCEDURE — 80048 BASIC METABOLIC PNL TOTAL CA: CPT | Performed by: INTERNAL MEDICINE

## 2020-01-12 PROCEDURE — 94660 CPAP INITIATION&MGMT: CPT

## 2020-01-12 PROCEDURE — 27210437 ZZH NUTRITION PRODUCT SEMIELEM INTERMED LITER

## 2020-01-12 PROCEDURE — 99233 SBSQ HOSP IP/OBS HIGH 50: CPT | Performed by: INTERNAL MEDICINE

## 2020-01-12 PROCEDURE — 00000146 ZZHCL STATISTIC GLUCOSE BY METER IP

## 2020-01-12 PROCEDURE — 12000000 ZZH R&B MED SURG/OB

## 2020-01-12 PROCEDURE — 92526 ORAL FUNCTION THERAPY: CPT | Mod: GN | Performed by: SPEECH-LANGUAGE PATHOLOGIST

## 2020-01-12 PROCEDURE — 83605 ASSAY OF LACTIC ACID: CPT | Performed by: INTERNAL MEDICINE

## 2020-01-12 PROCEDURE — 25000131 ZZH RX MED GY IP 250 OP 636 PS 637: Mod: GY | Performed by: INTERNAL MEDICINE

## 2020-01-12 PROCEDURE — 25000125 ZZHC RX 250: Performed by: INTERNAL MEDICINE

## 2020-01-12 PROCEDURE — 40000275 ZZH STATISTIC RCP TIME EA 10 MIN

## 2020-01-12 PROCEDURE — 25000128 H RX IP 250 OP 636: Performed by: INTERNAL MEDICINE

## 2020-01-12 PROCEDURE — 94640 AIRWAY INHALATION TREATMENT: CPT | Mod: 76

## 2020-01-12 PROCEDURE — 25000132 ZZH RX MED GY IP 250 OP 250 PS 637: Mod: GY | Performed by: INTERNAL MEDICINE

## 2020-01-12 RX ADMIN — ALBUTEROL SULFATE 2.5 MG: 2.5 SOLUTION RESPIRATORY (INHALATION) at 08:13

## 2020-01-12 RX ADMIN — METHADONE HYDROCHLORIDE 20 MG: 10 TABLET ORAL at 20:58

## 2020-01-12 RX ADMIN — ENOXAPARIN SODIUM 40 MG: 40 INJECTION SUBCUTANEOUS at 16:08

## 2020-01-12 RX ADMIN — GABAPENTIN 800 MG: 250 SOLUTION ORAL at 08:26

## 2020-01-12 RX ADMIN — ACETYLCYSTEINE 2 ML: 200 SOLUTION ORAL; RESPIRATORY (INHALATION) at 19:59

## 2020-01-12 RX ADMIN — HYDROCORTISONE 10 MG: 10 TABLET ORAL at 08:25

## 2020-01-12 RX ADMIN — METHADONE HYDROCHLORIDE 20 MG: 10 TABLET ORAL at 08:25

## 2020-01-12 RX ADMIN — LEVOTHYROXINE SODIUM 88 MCG: 88 TABLET ORAL at 08:25

## 2020-01-12 RX ADMIN — METHADONE HYDROCHLORIDE 10 MG: 10 TABLET ORAL at 16:09

## 2020-01-12 RX ADMIN — PIPERACILLIN AND TAZOBACTAM 4.5 G: 4; .5 INJECTION, POWDER, FOR SOLUTION INTRAVENOUS at 04:04

## 2020-01-12 RX ADMIN — ALBUTEROL SULFATE 2.5 MG: 2.5 SOLUTION RESPIRATORY (INHALATION) at 20:00

## 2020-01-12 RX ADMIN — ALBUTEROL SULFATE 2.5 MG: 2.5 SOLUTION RESPIRATORY (INHALATION) at 15:33

## 2020-01-12 RX ADMIN — ACETYLCYSTEINE 2 ML: 200 SOLUTION ORAL; RESPIRATORY (INHALATION) at 03:42

## 2020-01-12 RX ADMIN — ACETYLCYSTEINE 2 ML: 200 SOLUTION ORAL; RESPIRATORY (INHALATION) at 08:13

## 2020-01-12 RX ADMIN — AMLODIPINE BESYLATE 5 MG: 5 TABLET ORAL at 08:25

## 2020-01-12 RX ADMIN — INSULIN GLARGINE 25 UNITS: 100 INJECTION, SOLUTION SUBCUTANEOUS at 08:26

## 2020-01-12 RX ADMIN — INSULIN ASPART 1 UNITS: 100 INJECTION, SOLUTION INTRAVENOUS; SUBCUTANEOUS at 16:29

## 2020-01-12 RX ADMIN — ACETYLCYSTEINE 2 ML: 200 SOLUTION ORAL; RESPIRATORY (INHALATION) at 12:22

## 2020-01-12 RX ADMIN — ALBUTEROL SULFATE 2.5 MG: 2.5 SOLUTION RESPIRATORY (INHALATION) at 22:35

## 2020-01-12 RX ADMIN — ACETYLCYSTEINE 2 ML: 200 SOLUTION ORAL; RESPIRATORY (INHALATION) at 22:36

## 2020-01-12 RX ADMIN — ALBUTEROL SULFATE 2.5 MG: 2.5 SOLUTION RESPIRATORY (INHALATION) at 12:22

## 2020-01-12 RX ADMIN — LEVALBUTEROL HYDROCHLORIDE 1.25 MG: 1.25 SOLUTION, CONCENTRATE RESPIRATORY (INHALATION) at 03:41

## 2020-01-12 RX ADMIN — GABAPENTIN 800 MG: 250 SOLUTION ORAL at 16:36

## 2020-01-12 RX ADMIN — Medication 40 MG: at 08:26

## 2020-01-12 RX ADMIN — GABAPENTIN 800 MG: 250 SOLUTION ORAL at 22:26

## 2020-01-12 RX ADMIN — ACETYLCYSTEINE 2 ML: 200 SOLUTION ORAL; RESPIRATORY (INHALATION) at 15:33

## 2020-01-12 ASSESSMENT — ACTIVITIES OF DAILY LIVING (ADL)
ADLS_ACUITY_SCORE: 30

## 2020-01-12 NOTE — PLAN OF CARE
Discharge Planner SLP   Patient plan for discharge: Not addressed.   Current status: Patient seen for swallow treatment while up in the chair and her  present. Patient completed lingual resistance exercises x5. Completed 10 reps of lingual retraction. Expiratory muscle training x7 with a fairly strong cough produced after the 5th trial. She completed effortful swallows x10 with 5 ice chips. Education provided to limit ice chips to 1-8 per hour with RN only.    Recommend: 1. NPO with TF and limited single small ice chips with RN only 1-8 per hour. Hold if coughing or changes in her respiratory status. 2. SLP will continue to follow.   Barriers to return to prior living situation: Acuity of her illness.   Recommendations for discharge: TCU  Rationale for recommendations: Will need on going ST needs for dysphagia management and return to PO intake. Patient well below her baseline.        Entered by: Kimberly Lakhani 01/12/2020 4:45 PM

## 2020-01-12 NOTE — PROGRESS NOTES
Patient complains primarily of right-sided hip pain that she describes as being positional  Denies significant abdominal pain  Denies nausea    Patient remains afebrile, heart rate normal, blood pressure okay  Right-sided THAI no longer holding suction and appears to have pulled out, minimal output  Abdomen obese but soft, incision clean and intact, G-tube, J-tube, Malecot tube in place, nursing reports drainage from around the tubes next    CT scan from yesterday reviewed, no apparent evidence of abdominal source of elevated white count, likely source of white count remains complete atelectasis collapse of the right lung with pleural effusion, hospitalist aware    Assessment/plan: Status post complex repair of duodenal perforation, surgically stable, abdominal source of white count unlikely  We will remove right-sided THAI as it is no longer functioning and there does not appear to be residual abscess associated with this.  Continue tube feedings  No other new recommendations

## 2020-01-12 NOTE — PLAN OF CARE
A&O to self. VSS on 6L NC, Oxygen needs increasing. Lungs diminished. BS active, BM-, flatus+. Incisions closed w/ staples, purulent drainage coming from the bottom of midline incision and multiple drain sites. Tolerating tube feeding and ice chips. Suprapubic catheter w/ AUO. Methadone for pain. Turn and repo Q2 w/ lift. Diaphoretic, gown and linens changed multiple times.

## 2020-01-12 NOTE — PROGRESS NOTES
CLINICAL NUTRITION SERVICES - REASSESSMENT NOTE      Malnutrition: (1/12)  % Weight Loss:  None noted  % Intake:  No decreased intake noted (has been at goal TF past 5 days)  Subcutaneous Fat Loss:  None observed  Muscle Loss:  None observed  Fluid Retention:  None noted    Malnutrition Diagnosis: Patient does not meet two of the above criteria necessary for diagnosing malnutrition        EVALUATION OF PROGRESS TOWARD GOALS   Diet:    NPO    Nutrition Support:    Type of Feeding Tube:  Jejunostomy  Enteral Frequency:  Continuous  Enteral Regimen:  Peptamen Intense VHP at 60 mL/hr (goal rate)  Total Enteral Provisions:  1440 kcals (12 kcal/kg), 132 gm pro (2.5 gm/kg), 1210 mL H20, 7 gm fiber, 109 gm CHO, 96% RDI  Free Water Flush: (1/6 - MD) 100 mL every 4 hrs  TOTAL FREE WATER (TF + flushes) = 1810 mL/day    Intake/Tolerance:    Brandon TF at goal rate  +BM yesterday  BGM: 98, 96, 146 (HSSI, 20 units lantus every morning)  1/12: Na 139           K 4.1    1/11: SLP ---> No immediate or delayed Sx of aspiration, but can not rule out penetration/silent aspiration given weak cough response for airway protection. Rec continue NPO with TF      ASSESSED NUTRITION NEEDS:  Dosing Weight 122 kg (12/31, lowest wt - energy) and 54.5 kg (protein)  Estimated Energy Needs: 3963-0898 kcals (11-14 Kcal/Kg)  Justification: obese  Estimated Protein Needs: 110-135 grams protein (2-2.5 g pro/Kg)  Justification: hypercatabolism with acute illness and obesity guidelines      NEW FINDINGS:   Vitals:    12/29/19 1428 12/30/19 0345 12/31/19 0235 01/01/20 0400   Weight: 140 kg (308 lb 10.3 oz) 138.8 kg (306 lb) 122 kg (268 lb 15.4 oz) 124 kg (273 lb 5.9 oz)    01/02/20 0400 01/03/20 0415 01/04/20 0000 01/05/20 0000   Weight: 127 kg (279 lb 15.8 oz) 130 kg (286 lb 9.6 oz) 131 kg (288 lb 12.8 oz) 135 kg (297 lb 9.9 oz)    01/07/20 0034 01/08/20 0400 01/12/20 0636   Weight: (!) 167 kg (368 lb 2.7 oz) (!) 168 kg (370 lb 6 oz) (!) 170 kg (374 lb 12.5  oz)     1/11: Resp distress,  CT showed complete atelectasis of the right lung and small to moderate right pleural effusion    Previous Goals (1/9):   TF goal Peptamen Intense VHP at 60 mL/hr will meet % estimated  Evaluation: Met    Previous Nutrition Diagnosis (1/9):   No nutrition diagnosis identified at this time   Evaluation: No change      MALNUTRITION  % Weight Loss:  None noted  % Intake:  No decreased intake noted (has been at goal TF past 5 days)  Subcutaneous Fat Loss:  None observed  Muscle Loss:  None observed  Fluid Retention:  None noted    Malnutrition Diagnosis: Patient does not meet two of the above criteria necessary for diagnosing malnutrition      CURRENT NUTRITION DIAGNOSIS  No nutrition diagnosis identified at this time     INTERVENTIONS  Recommendations / Nutrition Prescription  NPO (diet per SLP)  TF as above    Implementation  No intervention at this time    Goals  TF goal Peptamen Intense VHP at 60 mL/hr will meet % estimated      MONITORING AND EVALUATION:  Progress towards goals will be monitored and evaluated per protocol and Practice Guidelines

## 2020-01-12 NOTE — PROGRESS NOTES
[facility]    Hospitalist Progress Note    Date of Service (when I saw the patient): 01/12/2020    Assessment & Plan   Bhavani White is a 64 year old female with hx of DM2, chronic cystitis s/p chronic SP catheter, chronic pain on methadone maintenance, asthma, ANIYA, and hypothyroidism who was admitted to the ICU service on 12/29/2019 for a perforated duodenal ulcer with associated septic shock and bowel ischemia. She is s/p ex-lap x2, but perforation could not be entirely repaired due to size; she is s/p THAI drains and GJ tube placement . Hospital course has otherwise been complicated by stress cardiomyopathy. She was weaned off pressors on 1/5 and successfully extubated on 1/6. She was transferred the the Hospitalist service on 1/6.     Acute metabolic encephalopathy, Improving  Multifactorial due to hemorrhagic/septic shock, ICU delirium. She was started on scheduled Seroquel while in the ICU which was discontinued with improvement in her mental status   - Currently alert and oriented, intermittently confused  - Seroquel 25 mg TID PRN for anxiety, agitation    Perforated duodenal ulcer with hemorrhagic shock and bowel ischemia, s/p ex lap, TRUONG, Malecot drain placement in perforation, and wound VAC on 12/29/2019, repeat ex-lap, TRUONG, closure of duodenal perforation over Malecot, G-tube/J-tube placements, and delayed primary closure on 1/1/2020  * Presented with 3 day history of progressive abdominal pain, nausea, and vomiting. En route, she was noted by EMS to vomit feculent material with associated aspiration, and was hypotensive on arrival. She was intubated in the ED, initiated on 4 pressor agents, and stress-dose steroids. CT chest/abd/pelvis on arrival showed RML infiltrate consistent with aspiration and evidence of perforated duodenum. She was initiated on IV pip-tazo, and taken emergent to the OR by General Surgery   * 12/29-12/30: s/p ex lap, TRUONG, Malecot placement in perforation, and TAC.  Gastric and duodenal ischemia and hemorrhage noted, no obvious leak. Blood was evacuated and drain placed.    * Returned to OR on 1/1 for repeat ex lap, TRUONG, closure of duodenal perf over Malecot, and GJ tube placement. Unable to completely repair perforation due to size. THAI drains placed for containment. Bowel ischemia improved on visualization  * Weaned off pressors on 1/5  * Repeat CT abd/pelvis on 1/11 showed surgical drains in place with only a few tiny foci of remaining air and no abscess. R THAI drain removed on 1/12  - Post-operative management as per General Surgery: Left THAI drain in place  - On tube feeds. Remains NPO with sips of ice chips/clears. Advance as per Gen Surgery/SLP  - Completed 14-day course of pip-tazo on 1/11  - On 7-day hydrocortisone taper (last day: 1/13)  - Continues on pantoprazole 40 mg PO daily   - Holding PTA aspirin due to PUD/GI hemorrhage    Acute hypoxic respiratory failure, multifactorial  * Due to aspiration pneumonia on admission and was found to have complete atelectasis of the right lung and small to moderate pleural effusion per CT on 1/11. Patient needs aggressive pulm toilet, but this is limited by encephalopathy and profound weakness.   * Completed 14-day course of IV pip-tazo on 1/11  * Case discussed with Intensivist on 1/11; recommended trial of nebs and RT assistance with pulm toilet  - WBC increasing, but patient afebrile with no new s/sx of infection. Hold additional antibiotics for now, but note that she is high risk for recurrent pneumonia.  - On albuterol nebs q4h while awake, mucomyst nebs QID, and percussion therapy per RT  - Encourage incentive spirometry, flutter valve, RCAT as able  - On 5-6 lpm O2 per NC, wean as tolerated    Septic shock with Staph hominis bacteremia, Improving to resolved  Aspiration event noted en route to the ED. AMS, hypotension, leukocytosis (20.5k), elevated LFT present on arrival. 1 out of 2 blood cultures on admission grew Staph  hominis, and arterial line on 1/2 also grew Staph hominis. This was removed upon return of blood cultures. She initially required 4 pressor agents and stress dose steroids which were gradually weaned; has been off pressors since 1/5.   She has been on the following anti-infective regimen:  * 12/29-1/2: IV vancomycin  * 12/30-1/6: IV meropenem  * 12/30-1/2: IV fluconazole for fungal prophylaxis  * Completed 14 day course of IV pip-tazo on 1/11  - On 7-day hydrocortisone taper (last day: 1/13)  - Repeat blood culture on 1/4: NGTD    Acute blood loss anemia   Baseline Hgb 16-19, decreased to 9-10 range from perforated ulcer and surgical losses.   - Hgb stable in 9-10 range, will follow     Elevated troponin due to suspected demand ischemia, initial concern for NSTEMI  Chronic systolic CHF due to stress cardiomyopathy  Initial troponin on arrival was elevated to 19.4 and subsequently trended down. EKG showed RBBB with septal Q waves. TTE (12/29) showed LVEF 30-40% with global hypokinesis predominantly involving the apex. Clinical presentation was felt by Cardiology to be consistent with stress cardiomyopathy. She was initially treated with IV heparin, but this was discontinued within 24 hours    - Clinically appears compensated, euvolemic  - Consider repeat TTE and ischemia work-up once more clinically stable    - Holding aspirin due to duodenal ulcer  - Holding statin due to elevated LFTs  - Cardiology has signed off    DM2 with peripheral neuropathy  * A1c 7.1. PTA on metformin 500 mg BID.   * Treated with insulin gtt 12/31-1/9  -  Decrease Lantus from 25 to 20 units daily today, 1/12. Blood sugars will decrease further as she completes her steroid taper  - High SSI available  Recent Labs   Lab 01/12/20  0542 01/12/20  0402 01/12/20  0006 01/11/20  2039 01/11/20  1602 01/11/20  1212 01/11/20  0600 01/11/20  0410  01/10/20  0550  01/09/20  0620  01/08/20  0435  01/07/20  0350   *  --   --   --   --   --  167*   --   --  139*  --  123*  --  144*  --  125*   BGM  --  96 98 127* 140* 134*  --  142*   < >  --    < >  --    < >  --    < >  --     < > = values in this interval not displayed.       ROLANDO stage II, Improved  Creatinine up to 2.5 from baseline 0.7-0.8. Suspect pre-renal state due to shock. Renal function improved with aggressive fluid resuscitation and blood pressure management.  - Creatinine appears to have plateaued in 0.9-1 range    Elevated transaminases due to septic shock  AST 1044, ALT 1363 on arrival. Improved with resolution of septic shock  - LFTs appear to have plateaued: AST 70s, ALT 90s. Will follow periodically       Chronic pain syndrome  * PTA methadone 20/10/20 mg TID, gabapentin 1600 mg TID, tizanidine 4 mg TID  - Continues on PTA methadone  - PTA gabapentin has been decreased to 800 mg TID in the setting of AMS and recent ROLANDO  - Holding PTA tizanidine  - Oxycodone, IV Dilaudid PRN      History of chronic cystitis s/p chronic suprapubic catheter  - SP catheter in place  - Holding PTA oxybutynin and pyridium.      Stage 2 pressure ulcer on left buttocks  Stage 1 pressure ulcers on sacrum and LLE  Present on admission  - Wound cares in place as per [facility]OC[facility]     Essential hypertension  * PTA: lisinopril 5 mg daily.    - Holding lisinopril in setting of recent shock and ROLANDO  - She has been started on amlodipine 5 mg daily, will follow blood pressure and titrate accordingly  - Labetalol PRN for SBP>175     Hyperlipidemia  - Holding PTA simvastatin due to elevated LFTs        Hypothyroidism:  - Continues on PTA levothyroxine 88 mcg daily.         Mild intermittent asthma  PTA on albuterol, levalbuterol PRN  - Bronchodilators available PRN     History of ANIYA  Reportedly no longer on PAP therapy  - Currently on biPAP qhs        Morbid obesity  BMI 62    FEN: NPO, on tube feeds  Drains/Tubes: SP catheter (chronic), THAI drain x1, G+J tubes (1/1)  Lines: RUE PICC, 1/5  DVT Prophylaxis: Enoxaparin (Lovenox) SQ  Code  Status: Full Code    Disposition: Expected discharge once cleared by General Surgery, able to work with PT/OT, mid to late next week at the earliest. Will need TCU    Lily Hassan    Interval History   No acute events overnight. Remains on 5-6L. Right THAI drain removed by Gen Surgery today. Offers no new concerns - abdominal pain stable. Denies cp/sob, nausea.   - d/c Zosyn  - decrease Lantus dose  - continue aggressive pulm toilet with RT    -Data reviewed today: I reviewed all new labs and imaging results over the last 24 hours. I personally reviewed no images or EKG's today.    Physical Exam   Temp: 97.7  F (36.5  C) Temp src: Axillary BP: 128/62 Pulse: 69 Heart Rate: 68 Resp: 22 SpO2: 93 % O2 Device: Nasal cannula Oxygen Delivery: 6 LPM  Vitals:    01/07/20 0034 01/08/20 0400 01/12/20 0636   Weight: (!) 167 kg (368 lb 2.7 oz) (!) 168 kg (370 lb 6 oz) (!) 170 kg (374 lb 12.5 oz)     Vital Signs with Ranges  Temp:  [97.4  F (36.3  C)-98.7  F (37.1  C)] 97.7  F (36.5  C)  Pulse:  [67-76] 69  Heart Rate:  [51-76] 68  Resp:  [11-23] 22  BP: (119-149)/(57-74) 128/62  SpO2:  [91 %-100 %] 93 %  I/O last 3 completed shifts:  In: 160 [NG/GT:160]  Out: 2763 [Urine:2150; Emesis/NG output:135; Drains:478]    Constitutional: NAD, non-toxic sppearing  Respiratory: Breathing non-labored. Diminished breath sounds anteriorly  Cardiovascular: Heart sounds distant, RRR. No pedal edema  GI: +BS, abd soft. Dressing over incision in place. THAI drain in place with purulent fluid. G-J tubes in place.    : SP catheter in place draining clear, yellow urine  Skin/Integument: Abdominal incision dressing in place  Musculoskeletal: Normal muscle bulk and tone  Neuro: Alert and appropriate. TORRES  Psych: Calm, cooperative with exam    Medications     dextrose       - MEDICATION INSTRUCTIONS -       - MEDICATION INSTRUCTIONS -       ACE/ARB/ARNI NOT PRESCRIBED       BETA BLOCKER NOT PRESCRIBED       STATIN NOT PRESCRIBED       sodium  chloride 20 mL/hr at 01/08/20 1000       acetylcysteine  2 mL Nebulization Q4H     albuterol  2.5 mg Nebulization Q4H While awake     amLODIPine  5 mg Oral or Feeding Tube Daily     enoxaparin ANTICOAGULANT  40 mg Subcutaneous Q24H     gabapentin  800 mg Oral or Feeding Tube TID     hydrocortisone  10 mg Oral Daily     insulin aspart  1-12 Units Subcutaneous Q4H     [START ON 1/13/2020] insulin glargine  20 Units Subcutaneous QAM AC     levothyroxine  88 mcg Oral or Feeding Tube QAM AC     methadone  10 mg Oral or Feeding Tube Q24H     methadone  20 mg Oral or Feeding Tube Q12H KENIA     pantoprazole  40 mg Oral or Feeding Tube QAM AC     sodium chloride (PF)  10 mL Intracatheter Q8H     Data   Recent Labs   Lab 01/12/20  0542 01/11/20  0600 01/10/20  0550 01/09/20  0620   WBC 24.8* 20.7* 20.4* 17.5*   HGB 9.6* 10.4* 9.6* 9.0*   MCV 91 90 92 92    284 273 279    142 147* 146*   POTASSIUM 4.1 4.0 4.2 3.9   CHLORIDE 103 107 112* 111*   CO2 31 33* 32 31   BUN 37* 37* 37* 43*   CR 0.90 0.98 1.05* 1.11*   ANIONGAP 5 2* 3 4   DORY 9.2 9.1 8.5 8.8   * 167* 139* 123*   ALBUMIN  --   --  1.4* 1.3*   PROTTOTAL  --   --  4.5* 4.5*   BILITOTAL  --   --  0.6 0.6   ALKPHOS  --   --  97 95   ALT  --   --  75* 74*   AST  --   --  43 38       Recent Results (from the past 24 hour(s))   CT Abdomen Pelvis w Contrast    Narrative    CT ABDOMEN AND PELVIS WITH CONTRAST   1/11/2020 1:26 PM     HISTORY: Abdominal infection (including peritonitis).    TECHNIQUE:   135 mL [id number]. CT images of the abdomen and pelvis  following nonionic intravenous contrast. Radiation dose for this scan  was reduced using automated exposure control, adjustment of the mA  and/or kV according to patient size, or iterative reconstruction  technique.    COMPARISON: None.    FINDINGS: There is complete collapse of the right lung associated with  right-sided pleural fluid. Mediastinal structures are shifted towards  the right. There is also a  small left pleural effusion with some left  basilar atelectasis.    Small amount of ascites is present. There has been recent midline  laparotomy. A balloon retention percutaneous gastrostomy tube is  present. There are several surgical drains in the abdomen. The liver,  gallbladder, and pancreas are unremarkable. There is a peripheral  wedge-shaped low-density in the spleen that measures less than 1 cm  (series 2, image 32). The adrenal glands are unremarkable. No  hydronephrosis or solid renal mass. No abdominal or retroperitoneal  lymphadenopathy.    Scattered foci of free intraperitoneal air compatible with recent  surgery. No evidence of bowel obstruction. No loculated  intra-abdominal fluid collection to suggest abscess. No pelvic  adenopathy. The uterus is absent. No lytic or blastic bone lesions.      Impression    IMPRESSION:  1. There appears to be complete atelectasis of the right lung with a  small-to-moderate sized RIGHT pleural effusion. There is also a small  left pleural effusion.  2. Numerous surgical drains in the abdomen following recent  laparotomy. There are a few scattered tiny foci of remaining  postsurgical air in the abdomen.  3. There is a small amount of ascites but no evidence of abscess.  4. Small wedge-shaped area of decreased density may represent a tiny  laceration or infarct.    ДМИТРИЙ HUERTA MD

## 2020-01-13 ENCOUNTER — APPOINTMENT (OUTPATIENT)
Dept: SPEECH THERAPY | Facility: CLINIC | Age: 65
DRG: 853 | End: 2020-01-13
Payer: MEDICARE

## 2020-01-13 ENCOUNTER — PRE VISIT (OUTPATIENT)
Dept: UROLOGY | Facility: CLINIC | Age: 65
End: 2020-01-13

## 2020-01-13 ENCOUNTER — APPOINTMENT (OUTPATIENT)
Dept: ULTRASOUND IMAGING | Facility: CLINIC | Age: 65
DRG: 853 | End: 2020-01-13
Attending: HOSPITALIST
Payer: MEDICARE

## 2020-01-13 ENCOUNTER — APPOINTMENT (OUTPATIENT)
Dept: PHYSICAL THERAPY | Facility: CLINIC | Age: 65
DRG: 853 | End: 2020-01-13
Payer: MEDICARE

## 2020-01-13 LAB
ALBUMIN SERPL-MCNC: 1.3 G/DL (ref 3.4–5)
ALP SERPL-CCNC: 82 U/L (ref 40–150)
ALT SERPL W P-5'-P-CCNC: 36 U/L (ref 0–50)
ANION GAP SERPL CALCULATED.3IONS-SCNC: 4 MMOL/L (ref 3–14)
AST SERPL W P-5'-P-CCNC: 28 U/L (ref 0–45)
BILIRUB DIRECT SERPL-MCNC: 0.2 MG/DL (ref 0–0.2)
BILIRUB SERPL-MCNC: 0.4 MG/DL (ref 0.2–1.3)
BUN SERPL-MCNC: 35 MG/DL (ref 7–30)
CALCIUM SERPL-MCNC: 9.3 MG/DL (ref 8.5–10.1)
CHLORIDE SERPL-SCNC: 103 MMOL/L (ref 94–109)
CO2 SERPL-SCNC: 32 MMOL/L (ref 20–32)
CREAT SERPL-MCNC: 0.85 MG/DL (ref 0.52–1.04)
ERYTHROCYTE [DISTWIDTH] IN BLOOD BY AUTOMATED COUNT: 16.3 % (ref 10–15)
GFR SERPL CREATININE-BSD FRML MDRD: 73 ML/MIN/{1.73_M2}
GLUCOSE BLDC GLUCOMTR-MCNC: 102 MG/DL (ref 70–99)
GLUCOSE BLDC GLUCOMTR-MCNC: 120 MG/DL (ref 70–99)
GLUCOSE BLDC GLUCOMTR-MCNC: 125 MG/DL (ref 70–99)
GLUCOSE BLDC GLUCOMTR-MCNC: 125 MG/DL (ref 70–99)
GLUCOSE BLDC GLUCOMTR-MCNC: 142 MG/DL (ref 70–99)
GLUCOSE BLDC GLUCOMTR-MCNC: 152 MG/DL (ref 70–99)
GLUCOSE SERPL-MCNC: 127 MG/DL (ref 70–99)
HCT VFR BLD AUTO: 28.6 % (ref 35–47)
HGB BLD-MCNC: 9.1 G/DL (ref 11.7–15.7)
MAGNESIUM SERPL-MCNC: 1.8 MG/DL (ref 1.6–2.3)
MCH RBC QN AUTO: 28.9 PG (ref 26.5–33)
MCHC RBC AUTO-ENTMCNC: 31.8 G/DL (ref 31.5–36.5)
MCV RBC AUTO: 91 FL (ref 78–100)
PHOSPHATE SERPL-MCNC: 2.9 MG/DL (ref 2.5–4.5)
PLATELET # BLD AUTO: 194 10E9/L (ref 150–450)
POTASSIUM SERPL-SCNC: 3.7 MMOL/L (ref 3.4–5.3)
PROT SERPL-MCNC: 4.6 G/DL (ref 6.8–8.8)
RBC # BLD AUTO: 3.15 10E12/L (ref 3.8–5.2)
SODIUM SERPL-SCNC: 139 MMOL/L (ref 133–144)
WBC # BLD AUTO: 16.7 10E9/L (ref 4–11)

## 2020-01-13 PROCEDURE — 40000809 ZZH STATISTIC NO DOCUMENTATION TO SUPPORT CHARGE

## 2020-01-13 PROCEDURE — 25000128 H RX IP 250 OP 636: Performed by: INTERNAL MEDICINE

## 2020-01-13 PROCEDURE — 99233 SBSQ HOSP IP/OBS HIGH 50: CPT | Performed by: HOSPITALIST

## 2020-01-13 PROCEDURE — 80076 HEPATIC FUNCTION PANEL: CPT | Performed by: INTERNAL MEDICINE

## 2020-01-13 PROCEDURE — 92526 ORAL FUNCTION THERAPY: CPT | Mod: GN | Performed by: SPEECH-LANGUAGE PATHOLOGIST

## 2020-01-13 PROCEDURE — 85027 COMPLETE CBC AUTOMATED: CPT | Performed by: INTERNAL MEDICINE

## 2020-01-13 PROCEDURE — 76604 US EXAM CHEST: CPT

## 2020-01-13 PROCEDURE — 80048 BASIC METABOLIC PNL TOTAL CA: CPT | Performed by: INTERNAL MEDICINE

## 2020-01-13 PROCEDURE — 97530 THERAPEUTIC ACTIVITIES: CPT | Mod: GP

## 2020-01-13 PROCEDURE — 94660 CPAP INITIATION&MGMT: CPT

## 2020-01-13 PROCEDURE — 84100 ASSAY OF PHOSPHORUS: CPT | Performed by: INTERNAL MEDICINE

## 2020-01-13 PROCEDURE — 40000257 ZZH STATISTIC CONSULT NO CHARGE VASC ACCESS

## 2020-01-13 PROCEDURE — 25000131 ZZH RX MED GY IP 250 OP 636 PS 637: Mod: GY | Performed by: INTERNAL MEDICINE

## 2020-01-13 PROCEDURE — 25000128 H RX IP 250 OP 636: Performed by: HOSPITALIST

## 2020-01-13 PROCEDURE — 25000125 ZZHC RX 250: Performed by: INTERNAL MEDICINE

## 2020-01-13 PROCEDURE — 94640 AIRWAY INHALATION TREATMENT: CPT | Mod: 76

## 2020-01-13 PROCEDURE — 25000132 ZZH RX MED GY IP 250 OP 250 PS 637: Mod: GY | Performed by: INTERNAL MEDICINE

## 2020-01-13 PROCEDURE — 40000239 ZZH STATISTIC VAT ROUNDS

## 2020-01-13 PROCEDURE — 40000275 ZZH STATISTIC RCP TIME EA 10 MIN

## 2020-01-13 PROCEDURE — 12000000 ZZH R&B MED SURG/OB

## 2020-01-13 PROCEDURE — 27210437 ZZH NUTRITION PRODUCT SEMIELEM INTERMED LITER

## 2020-01-13 PROCEDURE — G0463 HOSPITAL OUTPT CLINIC VISIT: HCPCS

## 2020-01-13 PROCEDURE — 94640 AIRWAY INHALATION TREATMENT: CPT

## 2020-01-13 PROCEDURE — 83735 ASSAY OF MAGNESIUM: CPT | Performed by: INTERNAL MEDICINE

## 2020-01-13 PROCEDURE — 00000146 ZZHCL STATISTIC GLUCOSE BY METER IP

## 2020-01-13 RX ORDER — FUROSEMIDE 10 MG/ML
20 INJECTION INTRAMUSCULAR; INTRAVENOUS ONCE
Status: COMPLETED | OUTPATIENT
Start: 2020-01-13 | End: 2020-01-13

## 2020-01-13 RX ADMIN — LEVALBUTEROL HYDROCHLORIDE 1.25 MG: 1.25 SOLUTION, CONCENTRATE RESPIRATORY (INHALATION) at 03:33

## 2020-01-13 RX ADMIN — ALBUTEROL SULFATE 2.5 MG: 2.5 SOLUTION RESPIRATORY (INHALATION) at 11:27

## 2020-01-13 RX ADMIN — METHADONE HYDROCHLORIDE 20 MG: 10 TABLET ORAL at 20:10

## 2020-01-13 RX ADMIN — INSULIN ASPART 1 UNITS: 100 INJECTION, SOLUTION INTRAVENOUS; SUBCUTANEOUS at 12:37

## 2020-01-13 RX ADMIN — ENOXAPARIN SODIUM 40 MG: 40 INJECTION SUBCUTANEOUS at 17:24

## 2020-01-13 RX ADMIN — METHADONE HYDROCHLORIDE 20 MG: 10 TABLET ORAL at 09:21

## 2020-01-13 RX ADMIN — ALBUTEROL SULFATE 2.5 MG: 2.5 SOLUTION RESPIRATORY (INHALATION) at 18:21

## 2020-01-13 RX ADMIN — HYDROCORTISONE 10 MG: 10 TABLET ORAL at 09:21

## 2020-01-13 RX ADMIN — ACETYLCYSTEINE 4 ML: 200 SOLUTION ORAL; RESPIRATORY (INHALATION) at 18:21

## 2020-01-13 RX ADMIN — LEVOTHYROXINE SODIUM 88 MCG: 88 TABLET ORAL at 09:21

## 2020-01-13 RX ADMIN — FUROSEMIDE 20 MG: 10 INJECTION, SOLUTION INTRAVENOUS at 17:25

## 2020-01-13 RX ADMIN — METHADONE HYDROCHLORIDE 10 MG: 10 TABLET ORAL at 14:49

## 2020-01-13 RX ADMIN — ALBUTEROL SULFATE 2.5 MG: 2.5 SOLUTION RESPIRATORY (INHALATION) at 23:15

## 2020-01-13 RX ADMIN — ALBUTEROL SULFATE 2.5 MG: 2.5 SOLUTION RESPIRATORY (INHALATION) at 15:05

## 2020-01-13 RX ADMIN — INSULIN ASPART 1 UNITS: 100 INJECTION, SOLUTION INTRAVENOUS; SUBCUTANEOUS at 17:34

## 2020-01-13 RX ADMIN — AMLODIPINE BESYLATE 5 MG: 5 TABLET ORAL at 09:20

## 2020-01-13 RX ADMIN — Medication 40 MG: at 09:20

## 2020-01-13 RX ADMIN — GABAPENTIN 800 MG: 250 SOLUTION ORAL at 17:25

## 2020-01-13 RX ADMIN — GABAPENTIN 800 MG: 250 SOLUTION ORAL at 09:20

## 2020-01-13 RX ADMIN — ACETYLCYSTEINE 2 ML: 200 SOLUTION ORAL; RESPIRATORY (INHALATION) at 15:04

## 2020-01-13 RX ADMIN — ALBUTEROL SULFATE 2.5 MG: 2.5 SOLUTION RESPIRATORY (INHALATION) at 07:47

## 2020-01-13 RX ADMIN — ACETYLCYSTEINE 4 ML: 200 SOLUTION ORAL; RESPIRATORY (INHALATION) at 11:29

## 2020-01-13 RX ADMIN — ACETYLCYSTEINE 2 ML: 200 SOLUTION ORAL; RESPIRATORY (INHALATION) at 23:15

## 2020-01-13 RX ADMIN — GABAPENTIN 800 MG: 250 SOLUTION ORAL at 21:58

## 2020-01-13 RX ADMIN — ACETYLCYSTEINE 2 ML: 200 SOLUTION ORAL; RESPIRATORY (INHALATION) at 03:33

## 2020-01-13 RX ADMIN — INSULIN GLARGINE 20 UNITS: 100 INJECTION, SOLUTION SUBCUTANEOUS at 09:20

## 2020-01-13 RX ADMIN — ACETYLCYSTEINE 4 ML: 200 SOLUTION ORAL; RESPIRATORY (INHALATION) at 07:52

## 2020-01-13 ASSESSMENT — ACTIVITIES OF DAILY LIVING (ADL)
ADLS_ACUITY_SCORE: 29
ADLS_ACUITY_SCORE: 30
ADLS_ACUITY_SCORE: 30
ADLS_ACUITY_SCORE: 29
ADLS_ACUITY_SCORE: 30
ADLS_ACUITY_SCORE: 30

## 2020-01-13 NOTE — PROGRESS NOTES
"[facility]  Hospitalist Progress Note    Date of admission: 12/29/2019  Date of Service (when I saw the patient): 01/13/2020      Interval History   Resumed care of patient today. Chart reviewed, discussed with RN. She feels dyspneic but \"about the same\". Wheezy Cough (+) no expectoration. Remains on 5LPM O2. Remained on BiPAP at night.   -Afebrile.   -Weight is not consistent but overall noted massive weight gain.   -post op pain controlled, no nausea.   -tolerating tube feeding.      Assessment & Plan   Bhavani White is a 64 year old female with hx of DM2, chronic cystitis s/p chronic SP catheter, chronic pain on methadone maintenance, asthma, ANIYA, and hypothyroidism who was admitted to the ICU service on 12/29/2019 for a perforated duodenal ulcer with associated septic shock and bowel ischemia. She is s/p ex-lap x2, but perforation could not be entirely repaired due to size; she is s/p THAI drains and GJ tube placement. Hospital course has otherwise been complicated by stress cardiomyopathy. She was weaned off pressors on 1/5 and successfully extubated on 1/6. She was transferred the the Hospitalist service on 1/6.     Acute metabolic encephalopathy, Improving  Multifactorial due to hemorrhagic/septic shock, ICU delirium. She was started on scheduled Seroquel while in the ICU which was discontinued with improvement in her mental status   - Currently alert and oriented  - Seroquel 25 mg TID PRN for anxiety, agitation    Perforated duodenal ulcer with hemorrhagic shock and bowel ischemia, s/p ex lap, TRUONG, Malecot drain placement in perforation, and wound VAC on 12/29/2019, repeat ex-lap, TRUONG, closure of duodenal perforation over Malecot, G-tube/J-tube placements, and delayed primary closure on 1/1/2020  * Presented with 3 day history of progressive abdominal pain, nausea, and vomiting. En route, she was noted by EMS to vomit feculent material with associated aspiration, and was hypotensive on " arrival. She was intubated in the ED, initiated on 4 pressor agents, and stress-dose steroids. CT chest/abd/pelvis on arrival showed RML infiltrate consistent with aspiration and evidence of perforated duodenum. She was initiated on IV pip-tazo, and taken emergent to the OR by General Surgery   * 12/29-12/30: s/p ex lap, TRUONG, Malecot placement in perforation, and TAC. Gastric and duodenal ischemia and hemorrhage noted, no obvious leak. Blood was evacuated and drain placed.    * Returned to OR on 1/1 for repeat ex lap, TRUONG, closure of duodenal perf over Malecot, and GJ tube placement. Unable to completely repair perforation due to size. THAI drains placed for containment. Bowel ischemia improved on visualization.  * Weaned off pressors on 1/5  * Repeat CT abd/pelvis on 1/11 showed surgical drains in place with only a few tiny foci of remaining air and no abscess. R THAI drain removed on 1/12  - Post-operative management as per General Surgery: Left THAI drain in place.  - On tube feeds. Remains NPO with sips of ice chips/clears. Advance as per Gen Surgery/SLP  - Completed 14-day course of pip-tazo on 1/11  - Completed 7-day hydrocortisone taper (last day: 1/13)  - Continues on pantoprazole 40 mg PO daily   - Holding PTA aspirin due to PUD/GI hemorrhage    Acute hypoxic respiratory failure, multifactorial  * Due to aspiration pneumonia on admission and was found to have complete atelectasis of the right lung and small to moderate pleural effusion per CT on 1/11. Patient needs aggressive pulm toilet, but this is limited by encephalopathy and profound weakness.   * Completed 14-day course of IV pip-tazo on 1/11  * Trial of nebs and RT assistance with pulm toilet  - WBC remained elevated but patient afebrile with no new s/sx of infection. High risk for recurrent pneumonia. WBC slightly better today 1/13, suspect this is partly also due to steroid. Monitor for fever or clinical signs of infection. Hold off on further abx as  clinically improving and just completed extensive course.   - On albuterol nebs q4h while awake, mucomyst nebs QID, and percussion therapy per RT  - Encourage incentive spirometry, flutter valve, RCAT as able  - Noted several lbs of weight gain, weight is likely not accurate but it overall reflects markedly increased weight. CT abd pelvis 1/12 picked up at least moderate pleural effusion on Right. Lasix 20 mg once IV, if tolerates and good output, will place 20-40 mg IV daiy from am.   - Also will get US Rt chest to eval for effusion, will plan thoracentesis if adequate fluid.   - On 5-6 lpm O2 per NC, wean as tolerated    Septic shock with Staph hominis bacteremia, Improving to resolved  Aspiration event noted en route to the ED. AMS, hypotension, leukocytosis (20.5k), elevated LFT present on arrival. 1 out of 2 blood cultures on admission grew Staph hominis, and arterial line on 1/2 also grew Staph hominis. This was removed upon return of blood cultures. She initially required 4 pressor agents and stress dose steroids which were gradually weaned; has been off pressors since 1/5.   She has been on the following anti-infective regimen:  * 12/29-1/2: IV vancomycin  * 12/30-1/6: IV meropenem  * 12/30-1/2: IV fluconazole for fungal prophylaxis  * Completed 14 day course of IV pip-tazo on 1/11  - completed 7-day hydrocortisone taper (last day: 1/13)  - Repeat blood culture on 1/4: NGTD    Acute blood loss anemia   Baseline Hgb 16-19, decreased to 9-10 range from perforated ulcer and surgical losses.   - Hgb stable in 9-10 range, will follow     Elevated troponin due to suspected demand ischemia, initial concern for NSTEMI  Chronic systolic CHF due to stress cardiomyopathy  Initial troponin on arrival was elevated to 19.4 and subsequently trended down. EKG showed RBBB with septal Q waves. TTE (12/29) showed LVEF 30-40% with global hypokinesis predominantly involving the apex. Clinical presentation was felt by Cardiology  to be consistent with stress cardiomyopathy. She was initially treated with IV heparin, but this was discontinued within 24 hours    - Clinically appears compensated, euvolemic  - Consider repeat TTE and ischemia work-up once more clinically stable    - Holding aspirin due to duodenal ulcer  - Holding statin due to elevated LFTs  - Cardiology has signed off    DM2 with peripheral neuropathy  * A1c 7.1. PTA on metformin 500 mg BID.   * Treated with insulin gtt 12/31-1/9  - BS stable and controlled with Lantus 20 units daily today. May need to further decrease dose as completed steroid today. Monitor for hypoglycemia.    - High SSI available     ROLANDO stage II, Improved  Creatinine up to 2.5 from baseline 0.7-0.8. Suspect pre-renal state due to shock. Renal function improved with aggressive fluid resuscitation and blood pressure management.  - Creatinine appears to have plateaued in 0.9-1 range    Elevated transaminases due to septic shock  AST 1044, ALT 1363 on arrival. Improved with resolution of septic shock  - LFTs appear to have plateaued: AST 70s, ALT 90s. Will follow periodically       Chronic pain syndrome  * PTA methadone 20/10/20 mg TID, gabapentin 1600 mg TID, tizanidine 4 mg TID  - Continues on PTA methadone  - PTA gabapentin has been decreased to 800 mg TID in the setting of AMS and recent ROLANDO  - Holding PTA tizanidine  - Oxycodone, IV Dilaudid PRN      History of chronic cystitis s/p chronic suprapubic catheter  - SP catheter in place  - Holding PTA oxybutynin and pyridium.      Stage 2 pressure ulcer on left buttocks  Stage 1 pressure ulcers on sacrum and LLE  Present on admission  - Wound cares in place as per WOCN     Essential hypertension  * PTA: lisinopril 5 mg daily.    - Holding lisinopril in setting of recent shock and ROLANDO  - She has been started on amlodipine 5 mg daily, will follow blood pressure and titrate accordingly. Hopefully will diurese with lasix and that also helps.   - Labetalol PRN  for SBP>175     Hyperlipidemia  - Holding PTA simvastatin due to elevated LFTs        Hypothyroidism:  - Continues on PTA levothyroxine 88 mcg daily.         Mild intermittent asthma  PTA on albuterol, levalbuterol PRN  - Bronchodilators available PRN     History of ANIYA  Reportedly no longer on PAP therapy  - Currently on biPAP qhs        Morbid obesity  BMI 63    FEN: NPO, on tube feeds  Drains/Tubes: SP catheter (chronic), THAI drain x1, G and J tubes (1/1)  Lines: RUE PICC, 1/5  DVT Prophylaxis: Enoxaparin (Lovenox) SQ  Code Status: Full Code    Disposition: Expected discharge: Once cleared by General Surgery, able to work with PT/OT. Also expect several days of diuresis, and improvement in respiratory function.  Will need TCU. Discussed with patient and RN.     Irish Arzate MD  Hospitalist      -Data reviewed today: I reviewed all new labs and imaging results over the last 24 hours. I personally reviewed no images or EKG's today.    Physical Exam   Temp: 97.8  F (36.6  C) Temp src: Oral BP: (!) 155/76 Pulse: 73 Heart Rate: 74 Resp: 16 SpO2: 94 % O2 Device: Nasal cannula Oxygen Delivery: 4 LPM  Vitals:    01/07/20 0034 01/08/20 0400 01/12/20 0636   Weight: (!) 167 kg (368 lb 2.7 oz) (!) 168 kg (370 lb 6 oz) (!) 170 kg (374 lb 12.5 oz)     Vital Signs with Ranges  Temp:  [97.8  F (36.6  C)-98.5  F (36.9  C)] 97.8  F (36.6  C)  Pulse:  [63-73] 73  Heart Rate:  [63-74] 74  Resp:  [16] 16  BP: (109-155)/(47-76) 155/76  FiO2 (%):  [40 %] 40 %  SpO2:  [94 %-98 %] 94 %  I/O last 3 completed shifts:  In: 1570 [P.O.:150; NG/GT:1420]  Out: 3455 [Urine:3150; Emesis/NG output:175; Drains:130]    Constitutional: alert awake, oriented, pleasant. Not in distress.   Respiratory: Shallow breaths, faint expiratory wheezing. Also bilateral crackles posteriorly. No tachypnea or distress. On 5LPM O2.  Cardiovascular: Heart sounds distant, RRR. Legs are puffy, edema (+).   GI: +BS, abd soft, obese/distended. Dressing over incision  in place. THAI drain in place serosanguinous fluid. G and J tubes in place.    : SP catheter in place draining clear, yellow urine  Skin/Integument: Abdominal incision dressing in place.  Musculoskeletal: edematous.   Neuro: Alert and appropriate. TORRES  Psych: Calm, cooperative with exam    Medications     dextrose       - MEDICATION INSTRUCTIONS -       - MEDICATION INSTRUCTIONS -       ACE/ARB/ARNI NOT PRESCRIBED       BETA BLOCKER NOT PRESCRIBED       STATIN NOT PRESCRIBED       sodium chloride 20 mL/hr at 01/08/20 1000       acetylcysteine  2 mL Nebulization Q4H     albuterol  2.5 mg Nebulization Q4H While awake     amLODIPine  5 mg Oral or Feeding Tube Daily     enoxaparin ANTICOAGULANT  40 mg Subcutaneous Q24H     furosemide  20 mg Intravenous Once     gabapentin  800 mg Oral or Feeding Tube TID     insulin aspart  1-12 Units Subcutaneous Q4H     insulin glargine  20 Units Subcutaneous QAM AC     levothyroxine  88 mcg Oral or Feeding Tube QAM AC     methadone  10 mg Oral or Feeding Tube Q24H     methadone  20 mg Oral or Feeding Tube Q12H KENIA     pantoprazole  40 mg Oral or Feeding Tube QAM AC     sodium chloride (PF)  10 mL Intracatheter Q8H     Data   Recent Labs   Lab 01/13/20  0545 01/12/20  0542 01/11/20  0600 01/10/20  0550   WBC 16.7* 24.8* 20.7* 20.4*   HGB 9.1* 9.6* 10.4* 9.6*   MCV 91 91 90 92    256 284 273    139 142 147*   POTASSIUM 3.7 4.1 4.0 4.2   CHLORIDE 103 103 107 112*   CO2 32 31 33* 32   BUN 35* 37* 37* 37*   CR 0.85 0.90 0.98 1.05*   ANIONGAP 4 5 2* 3   DORY 9.3 9.2 9.1 8.5   * 146* 167* 139*   ALBUMIN 1.3*  --   --  1.4*   PROTTOTAL 4.6*  --   --  4.5*   BILITOTAL 0.4  --   --  0.6   ALKPHOS 82  --   --  97   ALT 36  --   --  75*   AST 28  --   --  43       No results found for this or any previous visit (from the past 24 hour(s)).

## 2020-01-13 NOTE — PLAN OF CARE
Discharge Planner SLP   Patient plan for discharge: Did not discuss  Current status: Swallow Tx was provided this pm.  Pt demonstrated good oral motor function, delayed swallows, no aspiration signs with ice chips or nectar thick liquids by spoon, weak cough on command, and increased throat clearing after thin liquids by spoon.  Improvement noted in swallow Tx; however, unable to rule out silent aspiration given recent prolonged intubation and recent respiratory distress.    Recommend NPO status except for 1-20 ice chips per hour, sit at 90 degrees, single ice chips at a time, hold if decreased respiratory status/increased aspiration signs.      Pt is anxious for increased po intake.  Recommend instrumental exam to fully assess pharyngeal phase deficits/aspiration risk.  Pt positioning is a barrier for video swallow study completion, therefore, will pursue FEES completion if able over the next 1-2 days.     Barriers to return to prior living situation: Level of assist  Recommendations for discharge: TCU  Rationale for recommendations: SLP swallow Tx at TCU to maximize function for a least restrictive diet       Entered by: Sally Hernandez 01/13/2020 5:30 PM

## 2020-01-13 NOTE — TELEPHONE ENCOUNTER
Reason for Visit: Consult    Diagnosis: Recurrent UTIs, pt has SP catheter    Orders/Procedures/Records: Records available    Contact Patient: N/A    Rooming Requirements: Yakelin Cao  01/13/20  12:28 PM

## 2020-01-13 NOTE — PROGRESS NOTES
"General Surgery    Agree with below.  Main complaint today is her breathing.  TF at goal rate  Thai with minimal output  G-tube to gravity drain  Malecot tube to gravity drain  WBC decreased today down to 16.7  HGB stable    - continue current management    - SLP recommends NPO with TF an limited single small ice chips with THANG Ramirez PA-C        General Surgery -- PA Student    More alert and responsive that last seen by this provider on 1/10/2020.  Continues to show improvement and progress.  Denied shortness of breath, difficulty breathing, or pain other than her ears feel sore with the BiPAP.  Still feeling weak, \"arms and legs feel like they're 1,000 pounds\", and arm movement remains not much past flexing elbows and moving arms away from sides.  Still unable to bring incentive spirometer to mouth without assistance.  Continues to request ice chips and water.    Vital signs:  Temp: 98  F (36.7  C) Temp src: Axillary BP: 109/56 Pulse: 66 Heart Rate: 63 Resp: 16 SpO2: 98 % O2 Device: BiPAP/CPAP Oxygen Delivery: 4 LPM   Weight: (!) 170 kg (374 lb 12.5 oz)  Estimated body mass index is 63.34 kg/m  as calculated from the following:    Height as of 11/1/19: 1.638 m (5' 4.5\").    Weight as of this encounter: 170 kg (374 lb 12.5 oz).     Exam:  Resp: Non-labored.  Abdomen: Soft, non-distended, non-tender to palpation. Midline abdominal incision open to air, minimal serous drainage from inferior aspect without obvious signs of infection. Right THAI drain removed. All other tubes in place with clean in place dressings--see below for I/O.  Extremities: Able to flex elbows >90 degrees and minimally abduct arms away from sides. Weak hand  bilateral but stronger than last assessment on 1/10/2020. No lower extremity edema or pain. Able to move feet and ankles bilateral.    Labs:  WBC down 16.7 today from 24.8 yesterday.  Hgb down 9.1 today from 9.6 yesterday.    I/O:  Left THAI drain 5 mL serous 1/12/20 " 2300-1/13/20 0700. J tube with TF infusing 960 1/12/20 2300-1/13/20 0700. Malecot with dark bilious fluid 125 mL 1/12/20 2300-1/13/20 0700. G tube light yellow thin fluid 960 mL with gravity drainage 1/12/20 2300-1/13/20 0700. Suprapubic catheter in place with payan yellow urine 2,100mL 1/12/20 2300-1/13/20 0700.    Assessment/Plan:  Perforated duodenal ulcer with hemorrhagic shock and bowel ischemia, s/p ext lap, TRUONG, Malecot drain placement with perforation, and wound VAC on 12/29/2019. Repeat ex-lap, TRUONG, closure of duodenal perforation over Malecot, G-tube/J-tube placements, delayed primary closure on 1/1/2020.     - Continue water sips and ice chips    - Continue tube feedings via J tube   - Continue monitor drains   - Continue monitor WBC and Hgb   - PT/OT for strengthening   - Continue moving arms on own for strengthening and working towards incentive spirometery without assistance    Mike Blankenship, PA-S

## 2020-01-13 NOTE — PLAN OF CARE
"Alert to place and self. Inspirometer and acapella every hour x 7  breaths. Unable to  hold these items able to lift arms a few inches off the bed when commanded. Asks for ice continually. Up to chair with nurse nurses aide and ceiling lift. Any movement she will say ouch and that her abdomen hurts. Tolerated chair for 2 hours  here to visit.  co of her \" thighs were burning\"  while up in chair lung sounds diminished with scattered rhonchi  cough productive but unable to  cough out. Sats in the 90s on 4 liters GJ tube had yellow leaking at site. Gastric tube leaking at site lt green in color   "

## 2020-01-13 NOTE — PROGRESS NOTES
All nebs given as ordered. SPO2@ 90's.    CPAP/BiPAP Settings  IPAP: 12  EPAP: 5  Rate: 16  FiO2: 40  Timed Inspiration (sec): 0.7    CPAP/BiPAP/AVAPS/AVAPS AE Alarms  High Pressure: 20  Low Pressure: 5  Low Pressure Delay: 20  High Rate (breaths/min): 45  Low Rate (breaths/min): 5  Alarm Volume Level: 10    CPAP/BiPAP/AVAPS/AVAPS AE Patient Assessment  Skin Assessment: Clean, dry, intact  Barriers Applied: Gel pad and mepilex  Lung Sounds: Coarse    Plan: Continue patient on BiPAP as needed. RT will continue to monitor.

## 2020-01-13 NOTE — PROGRESS NOTES
SPIRITUAL HEALTH SERVICES Progress Note  [facility] [id number]    Visited pt per LOS.     Pt said that she didn't know that she already in the hospital for so long since she was in ICU. Pt shared that she is [employer] and a  from her [employer] came to visit her once. Pt asked me about transportation that can help her  get to the hospital except taxi. I shared my experiences with [employer] and [employer]. Pt started to have a severe pain, I called the nurse and [facility] will remain available for any future needs.      Carmen Sena  Chaplain Resident

## 2020-01-13 NOTE — PLAN OF CARE
A&O x 3, disoriented to place. VSS on 4L via NC during day, with bipap at night. Assist of 2-3 with lift for transfers to chair. NPO ex 8-10 ice chips/hr, enteral feedings at goal rate, tolerating well. 3 lumen PICC saline locked, needing dressing change. Abdominal incision is open to air, scant drainage. R former THAI site covered, small drainage. L THAI with little, serous output. Gastrectomy to gravity with clear, adequate output. Malecot with tan-valeria output. Dressings appear to be leaking less this shift. Chronic pain managed with methadone and gabapentin. Turned Q2. Suprapubic cath with good output. BS active, no BM this shift. Continue plan of care.

## 2020-01-13 NOTE — PLAN OF CARE
A/O x 4, vss, a-febrile, c/o abdominal discomfort, patient on methadone, up with lift to the chair, patient was in chair x 1 hr, did not tolerate well, reposition in bed, dressing on cocyx CDI, pt  on Tube feeding at 60 ml goal, with 100 ml water flush Q 4 hrs, COOK, on 4-5 L NC, uses Bipap at nite, continue to monitor.

## 2020-01-13 NOTE — PLAN OF CARE
PT:  Discharge Planner PT   Patient plan for discharge: Rehab  Current status: Pt motivated to get up to the chair and off of her back today. Required dependent assist of 2 for rolling R and L to place sling with high pain noted with any movement. Transferred to recliner with legs elevated via ceiling lift sling and Ax2. Positioned with pillows for comfort and offloading. Extra time needed for line management.  Barriers to return to prior living situation: Falls risk, unable to stand or ambulate and requires use of lift for mobility at this time.  Recommendations for discharge: TCU  Rationale for recommendations: Pt would benefit from continued PT to improve strength, balance, mobility to increase independence, reduce falls risk and increase safety before returning home.        Entered by: Wanda Forrest 01/13/2020 10:33 AM

## 2020-01-14 ENCOUNTER — PATIENT OUTREACH (OUTPATIENT)
Dept: CARE COORDINATION | Facility: CLINIC | Age: 65
End: 2020-01-14

## 2020-01-14 ENCOUNTER — APPOINTMENT (OUTPATIENT)
Dept: PHYSICAL THERAPY | Facility: CLINIC | Age: 65
DRG: 853 | End: 2020-01-14
Payer: MEDICARE

## 2020-01-14 ENCOUNTER — APPOINTMENT (OUTPATIENT)
Dept: SPEECH THERAPY | Facility: CLINIC | Age: 65
DRG: 853 | End: 2020-01-14
Payer: MEDICARE

## 2020-01-14 ENCOUNTER — APPOINTMENT (OUTPATIENT)
Dept: CARDIOLOGY | Facility: CLINIC | Age: 65
DRG: 853 | End: 2020-01-14
Attending: INTERNAL MEDICINE
Payer: MEDICARE

## 2020-01-14 DIAGNOSIS — Z71.89 OTHER SPECIFIED COUNSELING: Primary | Chronic | ICD-10-CM

## 2020-01-14 LAB
ANION GAP SERPL CALCULATED.3IONS-SCNC: 4 MMOL/L (ref 3–14)
BUN SERPL-MCNC: 30 MG/DL (ref 7–30)
CALCIUM SERPL-MCNC: 9.3 MG/DL (ref 8.5–10.1)
CHLORIDE SERPL-SCNC: 100 MMOL/L (ref 94–109)
CO2 SERPL-SCNC: 35 MMOL/L (ref 20–32)
CREAT SERPL-MCNC: 0.77 MG/DL (ref 0.52–1.04)
ERYTHROCYTE [DISTWIDTH] IN BLOOD BY AUTOMATED COUNT: 16.2 % (ref 10–15)
GFR SERPL CREATININE-BSD FRML MDRD: 82 ML/MIN/{1.73_M2}
GLUCOSE BLDC GLUCOMTR-MCNC: 117 MG/DL (ref 70–99)
GLUCOSE BLDC GLUCOMTR-MCNC: 129 MG/DL (ref 70–99)
GLUCOSE BLDC GLUCOMTR-MCNC: 131 MG/DL (ref 70–99)
GLUCOSE BLDC GLUCOMTR-MCNC: 131 MG/DL (ref 70–99)
GLUCOSE BLDC GLUCOMTR-MCNC: 137 MG/DL (ref 70–99)
GLUCOSE BLDC GLUCOMTR-MCNC: 138 MG/DL (ref 70–99)
GLUCOSE SERPL-MCNC: 150 MG/DL (ref 70–99)
HCT VFR BLD AUTO: 30.8 % (ref 35–47)
HGB BLD-MCNC: 9.7 G/DL (ref 11.7–15.7)
INTERPRETATION ECG - MUSE: NORMAL
MCH RBC QN AUTO: 28.4 PG (ref 26.5–33)
MCHC RBC AUTO-ENTMCNC: 31.5 G/DL (ref 31.5–36.5)
MCV RBC AUTO: 90 FL (ref 78–100)
PLATELET # BLD AUTO: 222 10E9/L (ref 150–450)
POTASSIUM SERPL-SCNC: 3.4 MMOL/L (ref 3.4–5.3)
RBC # BLD AUTO: 3.41 10E12/L (ref 3.8–5.2)
SODIUM SERPL-SCNC: 139 MMOL/L (ref 133–144)
TRIGL SERPL-MCNC: 167 MG/DL
WBC # BLD AUTO: 16.7 10E9/L (ref 4–11)

## 2020-01-14 PROCEDURE — 99232 SBSQ HOSP IP/OBS MODERATE 35: CPT | Mod: 25 | Performed by: INTERNAL MEDICINE

## 2020-01-14 PROCEDURE — 84478 ASSAY OF TRIGLYCERIDES: CPT | Performed by: INTERNAL MEDICINE

## 2020-01-14 PROCEDURE — 00000146 ZZHCL STATISTIC GLUCOSE BY METER IP

## 2020-01-14 PROCEDURE — 25000132 ZZH RX MED GY IP 250 OP 250 PS 637: Mod: GY | Performed by: INTERNAL MEDICINE

## 2020-01-14 PROCEDURE — 25000131 ZZH RX MED GY IP 250 OP 636 PS 637: Mod: GY | Performed by: INTERNAL MEDICINE

## 2020-01-14 PROCEDURE — 25000128 H RX IP 250 OP 636: Performed by: INTERNAL MEDICINE

## 2020-01-14 PROCEDURE — 25000125 ZZHC RX 250: Performed by: INTERNAL MEDICINE

## 2020-01-14 PROCEDURE — 40000239 ZZH STATISTIC VAT ROUNDS

## 2020-01-14 PROCEDURE — 97530 THERAPEUTIC ACTIVITIES: CPT | Mod: GP | Performed by: PHYSICAL THERAPIST

## 2020-01-14 PROCEDURE — 80048 BASIC METABOLIC PNL TOTAL CA: CPT | Performed by: INTERNAL MEDICINE

## 2020-01-14 PROCEDURE — 93321 DOPPLER ECHO F-UP/LMTD STD: CPT | Mod: 26 | Performed by: INTERNAL MEDICINE

## 2020-01-14 PROCEDURE — 99233 SBSQ HOSP IP/OBS HIGH 50: CPT | Performed by: INTERNAL MEDICINE

## 2020-01-14 PROCEDURE — 12000000 ZZH R&B MED SURG/OB

## 2020-01-14 PROCEDURE — 94640 AIRWAY INHALATION TREATMENT: CPT

## 2020-01-14 PROCEDURE — 93325 DOPPLER ECHO COLOR FLOW MAPG: CPT

## 2020-01-14 PROCEDURE — 92526 ORAL FUNCTION THERAPY: CPT | Mod: GN | Performed by: SPEECH-LANGUAGE PATHOLOGIST

## 2020-01-14 PROCEDURE — 93308 TTE F-UP OR LMTD: CPT | Mod: 26 | Performed by: INTERNAL MEDICINE

## 2020-01-14 PROCEDURE — 85027 COMPLETE CBC AUTOMATED: CPT | Performed by: INTERNAL MEDICINE

## 2020-01-14 PROCEDURE — 93325 DOPPLER ECHO COLOR FLOW MAPG: CPT | Mod: 26 | Performed by: INTERNAL MEDICINE

## 2020-01-14 PROCEDURE — 94640 AIRWAY INHALATION TREATMENT: CPT | Mod: 76

## 2020-01-14 PROCEDURE — 40000275 ZZH STATISTIC RCP TIME EA 10 MIN

## 2020-01-14 PROCEDURE — 92612 ENDOSCOPY SWALLOW (FEES) VID: CPT | Mod: GN | Performed by: SPEECH-LANGUAGE PATHOLOGIST

## 2020-01-14 RX ORDER — FUROSEMIDE 10 MG/ML
20 INJECTION INTRAMUSCULAR; INTRAVENOUS EVERY 12 HOURS
Status: DISCONTINUED | OUTPATIENT
Start: 2020-01-14 | End: 2020-01-15

## 2020-01-14 RX ORDER — ATORVASTATIN CALCIUM 10 MG/1
20 TABLET, FILM COATED ORAL EVERY EVENING
Status: DISCONTINUED | OUTPATIENT
Start: 2020-01-14 | End: 2020-01-15

## 2020-01-14 RX ADMIN — HYDROMORPHONE HYDROCHLORIDE 0.5 MG: 1 INJECTION, SOLUTION INTRAMUSCULAR; INTRAVENOUS; SUBCUTANEOUS at 04:52

## 2020-01-14 RX ADMIN — INSULIN GLARGINE 20 UNITS: 100 INJECTION, SOLUTION SUBCUTANEOUS at 10:07

## 2020-01-14 RX ADMIN — METHADONE HYDROCHLORIDE 20 MG: 10 TABLET ORAL at 10:06

## 2020-01-14 RX ADMIN — GABAPENTIN 800 MG: 250 SOLUTION ORAL at 10:07

## 2020-01-14 RX ADMIN — ENOXAPARIN SODIUM 40 MG: 40 INJECTION SUBCUTANEOUS at 17:25

## 2020-01-14 RX ADMIN — Medication 40 MG: at 06:29

## 2020-01-14 RX ADMIN — FUROSEMIDE 20 MG: 10 INJECTION, SOLUTION INTRAMUSCULAR; INTRAVENOUS at 13:16

## 2020-01-14 RX ADMIN — METHADONE HYDROCHLORIDE 20 MG: 10 TABLET ORAL at 20:30

## 2020-01-14 RX ADMIN — ACETYLCYSTEINE 2 ML: 200 SOLUTION ORAL; RESPIRATORY (INHALATION) at 07:29

## 2020-01-14 RX ADMIN — ACETYLCYSTEINE 2 ML: 200 SOLUTION ORAL; RESPIRATORY (INHALATION) at 11:10

## 2020-01-14 RX ADMIN — ACETYLCYSTEINE 4 ML: 200 SOLUTION ORAL; RESPIRATORY (INHALATION) at 20:37

## 2020-01-14 RX ADMIN — AMLODIPINE BESYLATE 5 MG: 5 TABLET ORAL at 10:06

## 2020-01-14 RX ADMIN — METHADONE HYDROCHLORIDE 10 MG: 10 TABLET ORAL at 13:16

## 2020-01-14 RX ADMIN — OXYCODONE HYDROCHLORIDE 5 MG: 5 TABLET ORAL at 13:16

## 2020-01-14 RX ADMIN — ALBUTEROL SULFATE 2.5 MG: 2.5 SOLUTION RESPIRATORY (INHALATION) at 20:37

## 2020-01-14 RX ADMIN — GABAPENTIN 800 MG: 250 SOLUTION ORAL at 17:11

## 2020-01-14 RX ADMIN — ALBUTEROL SULFATE 2.5 MG: 2.5 SOLUTION RESPIRATORY (INHALATION) at 11:11

## 2020-01-14 RX ADMIN — OXYCODONE HYDROCHLORIDE 5 MG: 5 TABLET ORAL at 03:47

## 2020-01-14 RX ADMIN — ALBUTEROL SULFATE 2.5 MG: 2.5 SOLUTION RESPIRATORY (INHALATION) at 07:29

## 2020-01-14 RX ADMIN — ATORVASTATIN CALCIUM 20 MG: 10 TABLET, FILM COATED ORAL at 20:30

## 2020-01-14 RX ADMIN — LEVOTHYROXINE SODIUM 88 MCG: 88 TABLET ORAL at 06:29

## 2020-01-14 ASSESSMENT — ACTIVITIES OF DAILY LIVING (ADL)
ADLS_ACUITY_SCORE: 30

## 2020-01-14 NOTE — PLAN OF CARE
Discharge Planner PT   Patient plan for discharge: TCU  Current status: Pt was very sleepy and took several attempts to wake and keep pt alert. Pt wanted to get up in chair and understood she has to be upright for speech session. Pt requires extra time managing lines, preparing room for transfer using ceiling lift. Pt O2 sats kept dropping on 3 1/2 L in supine to upper 80's. Pt was lowered to supine, rolled to side with modA of 2 to place sling under green sheet. Lift was used with assist of 3 to manage lines, assist pt to chair. Pt with special cushion in chair. with alarm. Pt was awake and tolerated transfer with some complaints of discomfort on bottom both in bed and in chair. /68 up in chair, O2 100%. Pillows used to keep pt in midline in chair.   Barriers to return to prior living situation: LEve of care and assist for mobility and cares  Recommendations for discharge: TCU  Rationale for recommendations: Pt is below baseline and dependent with mobility and cares and needs rehab to increase mobility and function       Entered by: Thu Pederson 01/14/2020 11:55 AM

## 2020-01-14 NOTE — PROGRESS NOTES
Clinic Care Coordination Contact    Situation: Patient chart reviewed by care coordinator.    Background: Received notification from homecare of patients discharge.     Assessment: Patient presently inpatient at [facility] since 12/29/2019 with diagnosis of duodenal ulcer perforation, elevated BMP, acute respiratory failure with hypoxia, NSTEMI, septic shock, diabetes mellitus. Anticipated discharge to TCU per most recent hospitalist notes dated yesterday.     Plan/Recommendations: CCRN will monitor for discharge. CCRN will attempt to complete outreach within 1-2 business days of discharge.     Ebony Nguyen RN Care Coordinator  [facility]  Email: [email or web address]  Phone: [phone]

## 2020-01-14 NOTE — CONSULTS
GASTROENTEROLOGY CONSULTATION      Bhavani White  64 year old female  Admission Date: 12/29/2019  Care Provider: Clinic, [facility]    I was asked to see this patient in consultation by Dr. Tse for evaluation of duodenal ulcer.    HPI:  Bhavani White is a 64 year old woman with type-2 diabetes, obesity, chronic cystitis s/p chronic SP catheter, chronic pain on methadone maintenance, asthma, ANIYA, and hypothyroidism who was admitted on 12/29/2019 for a perforated duodenal ulcer with associated septic shock and bowel ischemia, now s/p ex-lap x2 (perforation could not be entirely repaired due to size) still with THAI drains and GJ tube placement. Hospital course has also been complicated by stress cardiomyopathy.     GI is consulted for risk of possible dual-anti-platelet therapy if cardiac stent needed in the setting of recent duodenal ulcer.    Patient currently with appropriate post-surgical abdominal pain. Hemoglobin stable. She does have a history of previous peptic ulcer disease related to NSAIDs on EGD in 2012. Denies NSAIDs use recently.        MEDICAL HISTORY:  Patient Active Problem List    Diagnosis Date Noted     Fusion of spine of cervical region 01/07/2020     Priority: Medium     Acute respiratory failure with hypoxia (H) 12/30/2019     Priority: Medium     NSTEMI (non-ST elevated myocardial infarction) (H) 12/30/2019     Priority: Medium     Septic shock (H) 12/30/2019     Priority: Medium     Perforated duodenal ulcer (H) 12/29/2019     Priority: Medium     Duodenal ulcer perforation (H) 12/29/2019     Priority: Medium     Added automatically from request for surgery [id number]       Methadone use (H) 06/01/2017     Priority: Medium     Morbid obesity, unspecified obesity type (H) 05/04/2016     Priority: Medium     Osteoarthritis of cervical spine with myelopathy 03/04/2016     Priority: Medium     DISH (diffuse idiopathic skeletal hyperostosis) 03/04/2016     Priority: Medium     Elevated BMI  10/17/2015     Priority: Medium     Overview:   Body mass index is 47.93 kg/(m^^2) .       Wheelchair bound 08/15/2015     Priority: Medium     Personal history of methicillin resistant Staphylococcus aureus 2015     Priority: Medium     Neurogenic bladder 2015     Priority: Medium     Ventral hernia 2015     Priority: Medium     History of ulcer disease 2014     Priority: Medium     She notes from NSAIDs; nearly . Discussed a hospitalization at [place] for this.       Chronic narcotic dependence (H) 2014     Priority: Medium     Recurrent UTI (urinary tract infection) 10/24/2014     Priority: Medium     Controlled substance agreement signed 10/24/2014 10/24/2014     Priority: Medium     She had a controlled substance agreement with [employer] [facility] until seen by pain clinic. Now followed by pain clinic; see chronic pain       Fracture of lower extremity 2013     Priority: Medium     ACP (advance care planning) 2013     Priority: Medium     Advance Care Planning:   Receipt of ACP document:  Received: Resuscitation Guidelines order which was witnessed and signed by provider on 09 and 3-18-10.  Document previously scanned on 09 and 3-19-10. Order reviewed and found to be valid.  Code Status  needs to be updated to reflect choices in most recent ACP document. Confirmed/documented designated decision maker(s). See permanent comments section of demographics in clinical tab. View document(s) and details by clicking on code status. Added by Jie Stewart RN System ACP Cordinator on 2013.             Hyperlipidemia LDL goal <100 2011     Priority: Medium     Neuropathy 2011     Priority: Medium     Asthma 02/15/2011     Priority: Medium     Suprapubic catheter (H) 2011     Priority: Medium     Lumbar spinal stenosis 2011     Priority: Medium     Fibromyalgia 2011     Priority: Medium     Osteoarthritis 2011     Priority: Medium      Hypertension goal BP (blood pressure) < 140/90 02/11/2011     Priority: Medium     Health Care Home 02/11/2011     Priority: Medium       Status:  No active care coordination   Care Coordinator:  Wendy Pearson [phone]  See Letters for H Care Plan  Date:  May 23, 2016           Hypothyroidism due to acquired atrophy of thyroid 11/05/2010     Priority: Medium     Type 2 diabetes mellitus with diabetic nephropathy (H) 10/31/2010     Priority: Medium     Chronic low back pain 05/28/2009     Priority: Medium     1981       Moderate major depression (H) 05/28/2009     Priority: Medium     Anxiety 05/28/2009     Priority: Medium     A comprehensive ten point review of systems was performed and was negative aside from those in mentioned in the HPI.       :   Prior to Admission Medications   Prescriptions Last Dose Informant Patient Reported? Taking?   DULoxetine (CYMBALTA) 60 MG capsule Unknown at AM  Yes Yes   Sig: Take 120 mg by mouth every morning   Phenazopyridine HCl (PYRIDIUM PO) Unknown at Unknown time  Yes No   Sig: Take 2 tablets by mouth 3 times daily as needed Dose unknown   VENTOLIN  (90 Base) MCG/ACT inhaler Unknown at Unknown time  No No   Sig: INHALE 2 PUFFS INTO THE LUNGS EVERY 6 HOURS AS NEEDED FOR SHORTNESS OF BREATH   albuterol (PROVENTIL) (2.5 MG/3ML) 0.083% neb solution Unknown at Unknown time  No No   Sig: Take 1 vial (2.5 mg) by nebulization every 4 hours as needed for shortness of breath / dyspnea or wheezing   aspirin 81 MG tablet Unknown at Unknown time  Yes No   Sig: Take 81 mg by mouth daily    blood glucose (NO BRAND SPECIFIED) lancets standard   No No   Sig: Use to test blood sugar 1 times daily or as directed.   blood glucose monitoring (FREESTYLE FREEDOM LITE) meter device kit   No No   Sig: Use to test blood sugar.   blood glucose monitoring (NO BRAND SPECIFIED) meter device kit   No No   Sig: Dispense freestyle zak glucose meter   blood glucose monitoring (NO BRAND  SPECIFIED) test strip   No No   Sig: Use to test blood sugars 1 times daily or as directed   gabapentin (NEURONTIN) 800 MG tablet   Yes Yes   Sig: Take 1,600 mg by mouth 3 times daily   hydrOXYzine (ATARAX) 50 MG tablet Unknown  Yes Yes   Sig: Take 50 mg by mouth 2 times daily as needed for anxiety or other (insomnia)   levalbuterol (XOPENEX) 1.25 MG/3ML neb solution Unknown at Unknown time  Yes No   Sig: TAKE ONE VIAL BY NEBULIZATION EVERY 8 HOURS AS NEEDED FOR SHORTNESS OF BREATH/DYSPNEA OR WHEEZING   levothyroxine (SYNTHROID/LEVOTHROID) 88 MCG tablet Unknown at AM  No Yes   Sig: TAKE 1 TABLET(88 MCG) BY MOUTH DAILY   lisinopril (PRINIVIL/ZESTRIL) 5 MG tablet Unknown at AM  No Yes   Sig: TAKE 1 TABLET(5 MG) BY MOUTH DAILY   metFORMIN (GLUCOPHAGE-XR) 500 MG 24 hr tablet Unknown  No Yes   Sig: TAKE 1 TABLET BY MOUTH TWICE DAILY WITH MEALS   methadone (DOLOPHINE) 10 MG tablet Unknown  Yes Yes   Sig: Take 20 mg by mouth 2 times daily Per Rx bottle take 2 tablets (20mg) in morning, 1 tablet (10mg) in afternoon, 2 tablets (20mg) in evening.   methadone (DOLOPHINE) 10 MG tablet Unknown at afternoon  Yes Yes   Sig: Take 10 mg by mouth daily at 2 pm Per Rx bottle take 2 tablets (20mg) in morning, 1 tablet (10mg) in afternoon, 2 tablets (20mg) in evening.   multivitamin (CENTRUM SILVER) tablet Unknown  Yes Yes   Sig: Take 1 tablet by mouth daily   nitroFURantoin macrocrystal-monohydrate (MACROBID) 100 MG capsule Unknown  No No   Sig: Take 1 capsule (100 mg) by mouth 2 times daily for 5 days   ondansetron (ZOFRAN ODT) 4 MG ODT tab Unknown at Unknown time  No No   Sig: Take 1-2 tablets (4-8 mg) by mouth every 8 hours as needed for nausea   order for DME   No No   Sig: Equipment being ordered: Hospital Bed, total electric (head, foot, and height adjustments), with any type side rails, with mattress   order for DME   No No   Sig: Equipment being ordered: Motorized Wheelchair   order for DME   No No   Sig: Equipment being  "ordered: Trapeze bar for hospital bed   order for DME   No No   Sig: Equipment being ordered: Nebulizer   order for DME   No No   Sig: Equipment being ordered: BIPAP.   15/5, Rate of 12, 2L O2 to keep sats 90-95%.   order for DME   No No   Sig: Equipment being ordered: Nebulizer   oxybutynin ER (DITROPAN XL) 15 MG 24 hr tablet Unknown at AM  Yes Yes   Sig: Take 15 mg by mouth daily   ranitidine (ZANTAC) 150 MG tablet Unknown  Yes Yes   Sig: Take 150 mg by mouth 2 times daily as needed for heartburn   senna-docusate (SENOKOT-S;PERICOLACE) 8.6-50 MG per tablet Unknown  No Yes   Sig: Take 2 tablets by mouth 2 times daily   simvastatin (ZOCOR) 20 MG tablet Unknown at Unknown time  No No   Sig: TAKE 1 TABLET(20 MG) BY MOUTH AT BEDTIME   sulfamethoxazole-trimethoprim (BACTRIM DS/SEPTRA DS) 800-160 MG tablet Unknown at Unknown time  No No   Sig: Take 1 tablet by mouth 2 times daily for 5 days   tiZANidine (ZANAFLEX) 4 MG tablet Unknown at Unknown time  Yes No   Sig: Take 1 tablet (4 mg) by mouth 3 times daily   traZODone (DESYREL) 100 MG tablet Unknown at Unknown time  Yes No   Sig: Take 100 mg by mouth At Bedtime      Facility-Administered Medications: None      :   Allergies   Allergen Reactions     Povidone Iodine      \"mild skin irritation\" per patient report     Toradol [Ketorolac] Other (See Comments)     Pt gets nightmares     Tramadol Other (See Comments)      Social History:  Social History     Tobacco Use     Smoking status: Never Smoker     Smokeless tobacco: Never Used   Substance Use Topics     Alcohol use: No     Frequency: Never     Drinks per session: Patient refused     Binge frequency: Patient refused     Drug use: Yes     Types: Marijuana     Comment: Medical canibis       Family History:  No first degree relative with colon cancer, gastric cancer, crohn's disease, ulcerative colitis, or liver disease.     EXAM:  General Appearance: Alert, oriented, no acute distress.  /59 (BP Location: Left arm)  "  Pulse 70   Temp 98.8  F (37.1  C) (Oral)   Resp 18   Wt (!) 168.7 kg (371 lb 14.7 oz)   SpO2 95%   BMI 62.85 kg/m    Eye:  PERRL, sclera anicteric.  ENT: oropharynx clear, no thrush.  CV: RRR.  Resp: CTA bilaterally.  GI: Soft, NABS, mild/appropriately tender post-surgery, THAI drain with serosang fluid, GJ tube in place  No HSM.  No stigmata of chronic liver disease.  Musculoskeletal: +bilateral edema, no erythema.  Neuro: no asterixis.      Labs and imaging reviewed    Recent Labs   Lab Test 01/14/20  0540 01/13/20  0545 01/12/20  0542  12/29/19  1535 12/29/19  1436  07/26/17  1532   WBC 16.7* 16.7* 24.8*   < >  --  20.5*   < > 10.6   HGB 9.7* 9.1* 9.6*   < >  --  19.2*   < > 14.5   MCV 90 91 91   < >  --  87   < > 84    194 256   < >  --  285   < > 211   INR  --   --   --   --  1.61* Canceled, Test credited  --  0.97    < > = values in this interval not displayed.     Recent Labs   Lab Test 01/14/20  0540 01/13/20  0545 01/12/20  0542   POTASSIUM 3.4 3.7 4.1   CHLORIDE 100 103 103   CO2 35* 32 31   BUN 30 35* 37*   ANIONGAP 4 4 5     Recent Labs   Lab Test 01/13/20  0545 01/10/20  0550 01/09/20  0620  12/29/19  1649  12/05/19  1330 10/28/19  1459  12/10/17  1628   ALBUMIN 1.3* 1.4* 1.3*   < >  --    < >  --   --    < > 3.4   BILITOTAL 0.4 0.6 0.6   < >  --    < >  --   --    < > 0.7   ALT 36 75* 74*   < >  --    < >  --   --    < > 32   AST 28 43 38   < >  --    < >  --   --    < > 19   PROTEIN  --   --   --   --  300*  --  Canceled, Test credited* 30*   < >  --    LIPASE  --   --   --   --   --   --   --   --   --  98    < > = values in this interval not displayed.       ASSESSMENT AND PLAN:    64 year old woman with complicated medical history including previous NSAIDs-related peptic ulcer disease, admitted on 12/29/2019 for a perforated duodenal ulcer, s/p repair (not able to fully close due to size), course complicated by stress cardiomyopathy. GI consulted for risk assessment of possible  dual-platelet therapy if cardiac stent needed given recent ulcer.     Difficult to assess the risk of recurrent ulcer disease in part because it is not clear what caused this recent ulcer, since she denies NSAIDs. If cardiac risk is great enough to warrant urgent stent placement and anti-platelet therapy, then ulcer risk can be reduced with anti-acid therapy. Ideally, would wait at least 2 months for ulcer healing before potentially starting dual anti-platelet therapy however. She will also likely always have increased risk of peptic ulcer disease given her history, so would favor shortest reasonable duration of dual anti-platelet therapy if it is started.    -Ideally would wait at least 2 months after ulcer treatment before starting dual-platelet; however, can be started sooner if more urgent cardiac intervention is necessary  -Increase PPI to twice daily dosing (pantoprazole 40 mg twice per day)    Thank you for this interesting consult, please feel to call me if any questions arise.    Magnus Springer MD  January 14, 2020

## 2020-01-14 NOTE — PROGRESS NOTES
[facility]  WO Nurse Inpatient Wound Assessment   Reason for consultation: Evaluate and treat: Coccyx and sacral area                                                                          Bilateral  Posterior thighs    Assessment  Coccyx and Sacral area: skin is overall intact. No PI noted. Pt has scattered  Tiny areas of ecchymosis but not confluent area       Over a bony prominence.   Bilateral posterior thighs: very loose flabby skin with deep creases and shifting when pt changes position. Scattered areas of      Superficial pink areas attributed to friction of crease. No PI noted.     Treatment Plan  PIP measures:  1. Continue Bariatric bed.  2. Diligent Rt/Lt turning  3. HOB below 30 degrees if not medically containdicated  4. SP for UIC/incontinence protocol for FIC  5. Posterior thighs /sacrum /coccyx:  Mepilex sacrals for prevention  6. Heel suspesion @ all times in bed  7 Mobilize when medically indicated.   8. Chair cushion when in chair. Limit sitting to two hours. Careful with lift sheet on posterior thighs with transfer    Orders In Epic  WOC Nurse follow-up plan: weekly   Nursing to notify the Provider(s) and re-consult the WO Nurse if wound(s) deteriorates or new skin concern.    Patient History  According to provider note(s):  Bhavani White is a 64 year old female with hx of DM2, chronic cystitis s/p chronic SP catheter, chronic pain on methadone maintenance, asthma, ANIYA, and hypothyroidism who was admitted to the ICU service on 12/29/2019 for a perforated duodenal ulcer with associated septic shock and bowel ischemia. She is s/p ex-lap x2, but perforation could not be entirely repaired due to size; she is s/p THAI drains and GJ tube placement. Hospital course has otherwise been complicated by stress cardiomyopathy. She was weaned off pressors on 1/5 and successfully extubated on 1/6. She was transferred the the Hospitalist service on 1/6.      Acute metabolic encephalopathy,  Improving  Multifactorial due to hemorrhagic/septic shock, ICU delirium. She was started on scheduled Seroquel while in the ICU which was discontinued with improvement in her mental status   - Currently alert and oriented  - Seroquel 25 mg TID PRN for anxiety, agitation     Perforated duodenal ulcer with hemorrhagic shock and bowel ischemia, s/p ex lap, TRUONG, Malecot drain placement in perforation, and wound VAC on 12/29/2019, repeat ex-lap, TRUONG, closure of duodenal perforation over Malecot, G-tube/J-tube placements, and delayed primary closure on 1/1/2020    Objective Data  Containment of urine/stool: SP for UIC and Incontinence protocol for FIC    Active Diet Order:  Orders Placed This Encounter      Full Liquid Diet    Output:   I/O last 3 completed shifts:  In: 9904 [P.O.:90; NG/GT:2569]  Out: 5010 [Urine:4725; Emesis/NG output:175; Drains:110]    Risk Assessment:   Sensory Perception: 2-->very limited  Moisture: 3-->occasionally moist  Activity: 2-->chairfast  Mobility: 2-->very limited  Nutrition: 3-->adequate  Friction and Shear: 1-->problem  Joe Score: 13                          Labs:   Recent Labs   Lab 01/14/20  0540 01/13/20  0545   ALBUMIN  --  1.3*   HGB 9.7* 9.1*   WBC 16.7* 16.7*       Physical Exam  Skin inspection: Coccyx/sacrum and bilateral  posterior thighs    Skin to coccyx and sacrum basically intact. Tiny scattered areas of ecchymosis with skin intact. No erythema, No rash  Bilateral posterior thighs: Bilateral posterior thighs: very loose flabby skin with deep creases and shifting when pt changes position. Scattered areas of Superficial pink areas attributed to friction of creases. No PI noted.       Interventions  Current support surface Bariatric pulsate  Current off-loading measures: pillows / chair cushion  Visual inspection of wound(s) completed  Wound Care: Meplilex sacral in sacrum and bilateral posterior thighs for prevention  Supplies: In pt room   Education provided: Discussed  importance of repositioning and HOB below 30 degrees       Maty Epstein, RN  CWOC Nurse

## 2020-01-14 NOTE — PROGRESS NOTES
"General Surgery  Agree with below.   Discussed care with Dr Lopez  Surgery recommends staying on clear liquid diet  Continue Tube feeds and drains  TCU when bed available and cleared by Hospitalist  Will need long term tube feeds until ulcer heals.  Most likely will have IR down size Malecot over the next several months  Chika Ramirez PA-C      General Surgery--PA Student     Equally alert, oriented, and responsive as yesterday 1/13/2020  Reports difficulty breathing yesterday evening improved with Neb treatment, denied current shortness of breath and has scheduled Neb treatment [id number].  Reports difficulty sleeping last night due to hip/back pain and that she takes Trazedone at home and would like this here  Per nursing she has done better with her pain on Dilaudid  Continues to report arms and legs feeling \"heavy and weak\"  Continues to request ice chips and water    /55 (BP Location: Left arm)   Pulse 79   Temp 98.4  F (36.9  C) (Axillary)   Resp 16   Wt (!) 168.7 kg (371 lb 14.7 oz)   SpO2 98%   BMI 62.85 kg/m      Exam:  Resp: Non-labored shallow breaths, BS clear but diminished at bases bilateral  Heart: RRR without M/R/G or rubs  Abdomen: Soft, non-distended, non-tender to palpation. Midline abdominal incision open to air without drainage. Right THAI drain removed. Malecot tube slightly leaking purulent drainage. All other drains in place with clean dressings. See below for I/O.  Extremities: Bilateral elbow flexion >90 degree and minimal abduction away from sides. Bilateral weak handgrip similar to yesterday. No lower extremity edema or pain and able to move feet and ankles bilateral.    Labs:  WBC same as yesterday 16.7  Hgb up 9.6 from 9.1 yesterday    I/O 1/13/200 0700--1/14/2020 0700:  J tube in with TF infusing 6795mL with flushing  G tube in 2569 mL with flusing  Left THAI drain out 10mL serous  Urine out 5325 mL yellow  Malecot out 100mL reddish " javed    Assessment/Plan:  Perforated duodenal ulcer with hemorrhagic shock and bowel ischemia, s/p ext lap, TRUONG, Malecot drain placement with perforation, and wound VAC on 12/29/2019. Repeat ex-lap, TRUONG, closure of duodenal perforation over Malecot, G-tube/J-tube placements, delayed primary closure on 1/1/2020.     - Continue NPO with water sips and ice chips   - Continue tube feedings via J tube   - Continue monitor drains and WBC and HGB   - PT/OT for strengthening and encourage patient to move arms and legs on own   - Neb treatments [id number] for breathing    Mike Blankenship, PA-S

## 2020-01-14 NOTE — PROGRESS NOTES
Patient given nebulizer treatments as ordered. BS are clear but diminished t/o SpO2 is 97% on 5 lpm will decrease oxygen to 3 lpm nc.

## 2020-01-14 NOTE — PROGRESS NOTES
[facility]  Hospitalist Progress Note    Date of admission: 12/29/2019  Date of Service (when I saw the patient): 01/14/2020      Interval History   Resumed care of patient today. Chart reviewed, discussed with RN. SOB improved. On 3liters by NC  today. Ultrasound chest showed trace b/l  Pleural effusions     Assessment & Plan   Bhavani White is a 64 year old female with hx of DM2, chronic cystitis s/p chronic SP catheter, chronic pain on methadone maintenance, asthma, ANIYA, and hypothyroidism who was admitted to the ICU service on 12/29/2019 for a perforated duodenal ulcer with associated septic shock and bowel ischemia. She is s/p ex-lap x2, but perforation could not be entirely repaired due to size; she is s/p THAI drains and GJ tube placement. Hospital course has otherwise been complicated by stress cardiomyopathy. She was weaned off pressors on 1/5 and successfully extubated on 1/6. She was transferred the the Hospitalist service on 1/6.     Acute metabolic encephalopathy, Improving  Multifactorial due to hemorrhagic/septic shock, ICU delirium. She was started on scheduled Seroquel while in the ICU which was discontinued with improvement in her mental status   - Currently alert and oriented  - Seroquel 25 mg TID PRN for anxiety, agitation    Perforated duodenal ulcer with hemorrhagic shock and bowel ischemia, s/p ex lap, TRUONG, Malecot drain placement in perforation, and wound VAC on 12/29/2019, repeat ex-lap, TRUONG, closure of duodenal perforation over Malecot, G-tube/J-tube placements, and delayed primary closure on 1/1/2020  * Presented with 3 day history of progressive abdominal pain, nausea, and vomiting. En route, she was noted by EMS to vomit feculent material with associated aspiration, and was hypotensive on arrival. She was intubated in the ED, initiated on 4 pressor agents, and stress-dose steroids. CT chest/abd/pelvis on arrival showed RML infiltrate consistent with aspiration and  evidence of perforated duodenum. She was initiated on IV pip-tazo, and taken emergent to the OR by General Surgery   * 12/29-12/30: s/p ex lap, TRUONG, Malecot placement in perforation, and TAC. Gastric and duodenal ischemia and hemorrhage noted, no obvious leak. Blood was evacuated and drain placed.    * Returned to OR on 1/1 for repeat ex lap, TRUONG, closure of duodenal perf over Malecot, and GJ tube placement. Unable to completely repair perforation due to size. THAI drains placed for containment. Bowel ischemia improved on visualization.  * Weaned off pressors on 1/5  * Repeat CT abd/pelvis on 1/11 showed surgical drains in place with only a few tiny foci of remaining air and no abscess. R THAI drain removed on 1/12  - Post-operative management as per General Surgery: Left THAI drain in place.  - On tube feeds. Remains NPO with sips of ice chips/clears. Advance as per Gen Surgery/SLP  - Completed 14-day course of pip-tazo on 1/11  - Completed 7-day hydrocortisone taper (last day: 1/13)  - Continues on pantoprazole 40 mg PO daily   - Holding PTA aspirin due to PUD/GI hemorrhage  GI consult placed to recommend if pt can go on dual antiplatelets if needed with coronary angiogram and stent placement   Started on full liquid diet per speech path today     Acute hypoxic respiratory failure, multifactorial  * Due to aspiration pneumonia on admission and was found to have complete atelectasis of the right lung and small to moderate pleural effusion per CT on 1/11. Patient needs aggressive pulm toilet, but this is limited by encephalopathy and profound weakness.   * Completed 14-day course of IV pip-tazo on 1/11  * Trial of nebs and RT assistance with pulm toilet  - WBC remained elevated but patient afebrile with no new s/sx of infection. High risk for recurrent pneumonia. WBC slightly better today 1/13, suspect this is partly also due to steroid. Monitor for fever or clinical signs of infection. Hold off on further abx as  clinically improving and just completed extensive course.   - On albuterol nebs q4h while awake, mucomyst nebs QID, and percussion therapy per RT  - Encourage incentive spirometry, flutter valve, RCAT as able  - Noted several lbs of weight gain, weight is likely not accurate but it overall reflects markedly increased weight. CT abd pelvis 1/12 picked up at least moderate pleural effusion on Right. Lasix 20 mg once IV, if tolerates and good output, will place 20-40 mg IV daiy from am.   - Also will get US Rt chest to eval for effusion, will plan thoracentesis if adequate fluid.   - On 5-6 lpm O2 per NC, wean as tolerated  Started on lasix 20mg IV BID today to help with gentle diuresis     Septic shock with Staph hominis bacteremia, Improving to resolved  Aspiration event noted en route to the ED. AMS, hypotension, leukocytosis (20.5k), elevated LFT present on arrival. 1 out of 2 blood cultures on admission grew Staph hominis, and arterial line on 1/2 also grew Staph hominis. This was removed upon return of blood cultures. She initially required 4 pressor agents and stress dose steroids which were gradually weaned; has been off pressors since 1/5.   She has been on the following anti-infective regimen:  * 12/29-1/2: IV vancomycin  * 12/30-1/6: IV meropenem  * 12/30-1/2: IV fluconazole for fungal prophylaxis  * Completed 14 day course of IV pip-tazo on 1/11  - completed 7-day hydrocortisone taper (last day: 1/13)  - Repeat blood culture on 1/4: NGTD    Acute blood loss anemia   Baseline Hgb 16-19, decreased to 9-10 range from perforated ulcer and surgical losses.   - Hgb stable in 9-10 range, will follow     Elevated troponin due to suspected demand ischemia, initial concern for NSTEMI  Chronic systolic CHF due to stress cardiomyopathy  Initial troponin on arrival was elevated to 19.4 and subsequently trended down. EKG showed RBBB with septal Q waves. TTE (12/29) showed LVEF 30-40% with global hypokinesis predominantly  involving the apex. Clinical presentation was felt by Cardiology to be consistent with stress cardiomyopathy. She was initially treated with IV heparin, but this was discontinued within 24 hours    - Clinically appears compensated, euvolemic  - Consider repeat TTE and ischemia work-up once more clinically stable    - Holding aspirin due to duodenal ulcer  - restart  statin  Today as LFTS improved   - Cardiology reconsulted today   Repeat echo ordered   [facility] consulted to see if patient can be on dual antiplatelet agents with h/o perforated ulcer if needs a stent with coronary angiogram     DM2 with peripheral neuropathy  * A1c 7.1. PTA on metformin 500 mg BID.   * Treated with insulin gtt 12/31-1/9  - BS stable and controlled with Lantus 20 units daily today. May need to further decrease dose as completed steroid today. Monitor for hypoglycemia.    - High SSI available     ROLANDO stage II, Improved  Creatinine up to 2.5 from baseline 0.7-0.8. Suspect pre-renal state due to shock. Renal function improved with aggressive fluid resuscitation and blood pressure management.  - Creatinine appears to have plateaued in 0.9-1 range  Started on lasix 20mg IV BID for gentle diuresis     Elevated transaminases due to septic shock  AST 1044, ALT 1363 on arrival. Improved with resolution of septic shock  - LFTs appear to have plateaued: AST 70s, ALT 90s. Will follow periodically       Chronic pain syndrome  * PTA methadone 20/10/20 mg TID, gabapentin 1600 mg TID, tizanidine 4 mg TID  - Continues on PTA methadone  - PTA gabapentin has been decreased to 800 mg TID in the setting of AMS and recent ROLANDO  - Holding PTA tizanidine  - Oxycodone, IV Dilaudid PRN      History of chronic cystitis s/p chronic suprapubic catheter  - SP catheter in place  - Holding PTA oxybutynin and pyridium.      Stage 2 pressure ulcer on left buttocks  Stage 1 pressure ulcers on sacrum and LLE  Present on admission  - Wound cares in place as per  [facility]     Essential hypertension  * PTA: lisinopril 5 mg daily.    - Holding lisinopril in setting of recent shock and ROLANDO  - She has been started on amlodipine 5 mg daily, will follow blood pressure and titrate accordingly. Hopefully will diurese with lasix and that also helps.   - Labetalol PRN for SBP>175     Hyperlipidemia  - Holding PTA simvastatin due to elevated LFTs        Hypothyroidism:  - Continues on PTA levothyroxine 88 mcg daily.         Mild intermittent asthma  PTA on albuterol, levalbuterol PRN  - Bronchodilators available PRN     History of ANIYA  Reportedly no longer on PAP therapy  - Currently on biPAP qhs        Morbid obesity  BMI 63    FEN: NPO, on tube feeds  Drains/Tubes: SP catheter (chronic), THAI drain x1, G and J tubes (1/1)  Lines: RUE PICC, 1/5  DVT Prophylaxis: Enoxaparin (Lovenox) SQ  Code Status: Full Code    Disposition: Expected discharge: Once cleared by General Surgery, able to work with PT/OT. Also expect several days of diuresis, and improvement in respiratory function.  Will need TCU. Discussed with patient and RN and cardiology .     Sherrie Tse MD  Hospitalist      -Data reviewed today: I reviewed all new labs and imaging results over the last 24 hours. I personally reviewed no images or EKG's today.    Physical Exam   Temp: 98  F (36.7  C) Temp src: Oral BP: 135/76 Pulse: 72 Heart Rate: 69 Resp: 18 SpO2: 99 % O2 Device: Nasal cannula Oxygen Delivery: 3 LPM  Vitals:    01/08/20 0400 01/12/20 0636 01/14/20 0644   Weight: (!) 168 kg (370 lb 6 oz) (!) 170 kg (374 lb 12.5 oz) (!) 168.7 kg (371 lb 14.7 oz)     Vital Signs with Ranges  Temp:  [97.7  F (36.5  C)-98.5  F (36.9  C)] 98  F (36.7  C)  Pulse:  [72-79] 72  Heart Rate:  [68-80] 69  Resp:  [16-18] 18  BP: (120-135)/(55-76) 135/76  FiO2 (%):  [30 %] 30 %  SpO2:  [94 %-99 %] 99 %  I/O last 3 completed shifts:  In: 73551 [P.O.:90; NG/GT:2669]  Out: 5610 [Urine:5325; Emesis/NG output:175; Drains:110]    Constitutional: alert  awake, oriented, pleasant. Not in distress.   Respiratory: Shallow breaths, faint expiratory wheezing. Also bilateral crackles posteriorly. No tachypnea or distress. Cardiovascular: Heart sounds distant, RRR. Legs are puffy, edema (+).   GI: +BS, abd soft, obese/distended. Dressing over incision in place. THAI drain in place serosanguinous fluid. G and J tubes in place.    : SP catheter in place draining clear, yellow urine  Skin/Integument: Abdominal incision dressing in place.  Musculoskeletal: edematous.   Neuro: Alert and appropriate. TORRES  Psych: Calm, cooperative with exam    Medications     dextrose       - MEDICATION INSTRUCTIONS -       - MEDICATION INSTRUCTIONS -       ACE/ARB/ARNI NOT PRESCRIBED       BETA BLOCKER NOT PRESCRIBED       STATIN NOT PRESCRIBED       sodium chloride 20 mL/hr at 01/08/20 1000       acetylcysteine  2 mL Nebulization Q4H     albuterol  2.5 mg Nebulization Q4H While awake     amLODIPine  5 mg Oral or Feeding Tube Daily     enoxaparin ANTICOAGULANT  40 mg Subcutaneous Q24H     furosemide  20 mg Intravenous Q12H     gabapentin  800 mg Oral or Feeding Tube TID     insulin aspart  1-12 Units Subcutaneous Q4H     insulin glargine  20 Units Subcutaneous QAM AC     levothyroxine  88 mcg Oral or Feeding Tube QAM AC     methadone  10 mg Oral or Feeding Tube Q24H     methadone  20 mg Oral or Feeding Tube Q12H KENIA     pantoprazole  40 mg Oral or Feeding Tube QAM AC     sodium chloride (PF)  10 mL Intracatheter Q8H     Data   Recent Labs   Lab 01/14/20  0540 01/13/20  0545 01/12/20  0542  01/10/20  0550   WBC 16.7* 16.7* 24.8*   < > 20.4*   HGB 9.7* 9.1* 9.6*   < > 9.6*   MCV 90 91 91   < > 92    194 256   < > 273    139 139   < > 147*   POTASSIUM 3.4 3.7 4.1   < > 4.2   CHLORIDE 100 103 103   < > 112*   CO2 35* 32 31   < > 32   BUN 30 35* 37*   < > 37*   CR 0.77 0.85 0.90   < > 1.05*   ANIONGAP 4 4 5   < > 3   DORY 9.3 9.3 9.2   < > 8.5   * 127* 146*   < > 139*   ALBUMIN   --  1.3*  --   --  1.4*   PROTTOTAL  --  4.6*  --   --  4.5*   BILITOTAL  --  0.4  --   --  0.6   ALKPHOS  --  82  --   --  97   ALT  --  36  --   --  75*   AST  --  28  --   --  43    < > = values in this interval not displayed.       Recent Results (from the past 24 hour(s))   US Chest Pleural Effusion Imaging    Narrative    ULTRASOUND CHEST  1/13/2020 4:24 PM    INDICATION: Pleural effusion.   TECHNIQUE: Routine  COMPARISON: None.    FINDINGS: Trace bilateral pleural effusions.    CLIVE TEIXEIRA MD

## 2020-01-14 NOTE — PLAN OF CARE
Pt is A&Ox4, VSS ex on BiPap overnight for approximately 1-2hours, then switched to NC @ 3 LPM. G-tube drain patent, marroon output. Malecot drain patent with bile/ light yellow/green output. THAI x1 to bulb suction, serous output. Suprapubic catheter in place, adequate UOP. Dsg to gastrostomy changed x2 this shift. Mepliex to coccyx. TF going at 60ml/hr- goal rate. ABD incision, closed with staples, is CDI. LS dim on R, coarse crackles on L. BS+, flatus+. PRN dilaudid and scheduled methadone for pain management. Turn and repo q2h. Pt is NPO ex ice chips, no N/V, assist x2 and lift. Interdry btwn panus.

## 2020-01-14 NOTE — PROGRESS NOTES
Cardiology Progress Note  Odin Rios MD         Assessment and Plan:      We were asked to see the patient again to complete work-up for potential cardiac issues.  Patient was admitted with perforated duodenal ulcer and septic shock and underwent emergent surgery.  She was mechanically ventilated for few days and now is extubated and on surgical floor.  During her initial presentation, EF was 30% which was new and her troponin had gone up to 19.  She is now on proton pump inhibitor.  Her sepsis has resolved.    Impression  1.  Elevated troponin in setting of perforated duodenal ulcer and septic shock, could be type II myocardial ischemia but cannot exclude non-ST elevation myocardial infarction  Initial EF was 30%.  We will repeat echo.  If echo shows normalization of ejection fraction, we may consider doing CT coronary angiography or stress MRI.  If EF is still low, we may proceed with invasive coronary angiography.  Patient is okay with either of these investigations.  I will asked hospitalist to discuss with GI if he can use antiplatelet agents in case we do angiography and she needs PCI.    2.  Cardiomyopathy  New finding during initial admission when she was admitted with sepsis and perforated duodenal ulcer.  We will repeat echo.  No evidence of congestive heart failure.    3.  History of perforated ulcer, status for surgery  On PPI, management per hospitalist    Patient also is wheelchair-bound and she tells me this is related to her degenerative back issues and multiple back surgeries.  Statins were held because of abnormal LFTs.  Now ALT and AST are normal.  Can resume statin.                     Interval History:   denies chest pain          Review of Systems:   The 5 point Review of Systems is negative other than noted in the HPI          Physical Exam:        Blood pressure 135/76, pulse 72, temperature 98  F (36.7  C), temperature source Oral, resp. rate 18, weight (!) 168.7 kg (371 lb 14.7  oz), SpO2 99 %, not currently breastfeeding.  Vitals:    01/08/20 0400 01/12/20 0636 01/14/20 0644   Weight: (!) 168 kg (370 lb 6 oz) (!) 170 kg (374 lb 12.5 oz) (!) 168.7 kg (371 lb 14.7 oz)     Weights since admission  Vitals:    12/29/19 1428 12/30/19 0345 12/31/19 0235 01/01/20 0400   Weight: 140 kg (308 lb 10.3 oz) 138.8 kg (306 lb) 122 kg (268 lb 15.4 oz) 124 kg (273 lb 5.9 oz)    01/02/20 0400 01/03/20 0415 01/04/20 0000 01/05/20 0000   Weight: 127 kg (279 lb 15.8 oz) 130 kg (286 lb 9.6 oz) 131 kg (288 lb 12.8 oz) 135 kg (297 lb 9.9 oz)    01/07/20 0034 01/08/20 0400 01/12/20 0636 01/14/20 0644   Weight: (!) 167 kg (368 lb 2.7 oz) (!) 168 kg (370 lb 6 oz) (!) 170 kg (374 lb 12.5 oz) (!) 168.7 kg (371 lb 14.7 oz)     Vital Signs with Ranges  Temp:  [97.7  F (36.5  C)-98.5  F (36.9  C)] 98  F (36.7  C)  Pulse:  [72-79] 72  Heart Rate:  [68-80] 69  Resp:  [16-18] 18  BP: (120-135)/(55-76) 135/76  FiO2 (%):  [30 %] 30 %  SpO2:  [94 %-99 %] 99 %  I/O's Last 24 hours  I/O last 3 completed shifts:  In: 38293 [P.O.:90; NG/GT:2669]  Out: 5610 [Urine:5325; Emesis/NG output:175; Drains:110]  Net I/O since admission  01/09 0700 - 01/14 0659  In: 14009 [P.O.:290]  Out: 68528 [Urine:81239; Drains:1790]  Net: -5286    EXAM:    Constitutional:   in no apparent distress     Head:   normocepalic     Lungs:   normal     Cardiovascular:   normal S1 and S2     Neurological:   Lower extremity weakness            Medications:          acetylcysteine  2 mL Nebulization Q4H     albuterol  2.5 mg Nebulization Q4H While awake     amLODIPine  5 mg Oral or Feeding Tube Daily     enoxaparin ANTICOAGULANT  40 mg Subcutaneous Q24H     furosemide  20 mg Intravenous Q12H     gabapentin  800 mg Oral or Feeding Tube TID     insulin aspart  1-12 Units Subcutaneous Q4H     insulin glargine  20 Units Subcutaneous QAM AC     levothyroxine  88 mcg Oral or Feeding Tube QAM AC     methadone  10 mg Oral or Feeding Tube Q24H     methadone  20 mg Oral or  Feeding Tube Q12H KENIA     pantoprazole  40 mg Oral or Feeding Tube QAM AC     sodium chloride (PF)  10 mL Intracatheter Q8H            Data:      All new lab and imaging data was reviewed.   Recent Labs   Lab Test 01/14/20  0540 01/13/20  0545 01/12/20  0542  12/29/19  1535 12/29/19  1436  07/26/17  1532   WBC 16.7* 16.7* 24.8*   < >  --  20.5*   < > 10.6   HGB 9.7* 9.1* 9.6*   < >  --  19.2*   < > 14.5   MCV 90 91 91   < >  --  87   < > 84    194 256   < >  --  285   < > 211   INR  --   --   --   --  1.61* Canceled, Test credited  --  0.97    < > = values in this interval not displayed.      Recent Labs   Lab Test 01/14/20  0540 01/13/20  0545 01/12/20  0542    139 139   POTASSIUM 3.4 3.7 4.1   CHLORIDE 100 103 103   CO2 35* 32 31   BUN 30 35* 37*   CR 0.77 0.85 0.90   ANIONGAP 4 4 5   DORY 9.3 9.3 9.2   * 127* 146*     Recent Labs   Lab Test 12/30/19  0610 12/29/19  2130 12/29/19  1436   TROPI 12.097* 12.385* 19.402*              Imaging:   Recent Results (from the past 24 hour(s))   US Chest Pleural Effusion Imaging    Narrative    ULTRASOUND CHEST  1/13/2020 4:24 PM    INDICATION: Pleural effusion.   TECHNIQUE: Routine  COMPARISON: None.    FINDINGS: Trace bilateral pleural effusions.    CLIVE TEIXEIRA MD

## 2020-01-14 NOTE — PROGRESS NOTES
"FEES (FIBEROPTIC ENDOSCOPIC EVALUATION OF SWALLOWING)       01/14/20 1100   General Information   Onset Date 12/29/19   Start of Care Date 01/14/20   Patient Profile Review/OT: Additional Occupational Profile Info See Profile for full history and prior level of function   Patient/Family Goals Statement To return to eating   Swallowing Evaluation FEES evaluation   Behaviorial Observations Alert   Mode of current nutrition NPO  (GJ tube)   Respiratory Status O2 Supply   Type of O2 supply Nasal cannula  (3L)   Comments Patient admitted for duodenal ulcer perforation, NSTEMI, and acute respiratory failure with hypoxia on 12/29.   En route to [facility], patient had vomited up \"fecal material\" and suspected aspiration event.  Now S/p exploratory lap, TRUONG, Malecot placement in perforation, and TAC on 12/29 and closure of duodenal perf over Malecot, G tube, jejunostomy, and delayed primary closure on 1/01.   The patient's past medical history is significant for chronic pain on methadone, obesity, ANIYA, hypothyroidism, DM2 with neuropathy, OA, MDD, GERD, HLP, Asthma.   Clinical Swallow Evaluation   Oral Musculature   (Completed previously during admission)   Dentition edentulous, does not have dentures  (Has and wears at home.)   Laryngeal Function   (Normal voicing return)   Additional evaluation(s) completed today Yes;Recommended   FEES Evaluation   Additional Documentation Yes   FEES Eval   Signs/Symptoms Noted Respiratory status changes over a few days   Type of Scope Adult   Scope Serial Number [id number]   Nares Entry nostril entered using no topical anesthetic   Nares Passage Scope passed though right nares   NG Tube/Nasal O2 cannula other (see comments)  (O2)   Symmetry of Anatomy upon entry   Location of Secretions None   Amount of Secretions None   FEES Eval: Thin Liquid Texture Trial   Mode of Presentation, Thin Liquid cup;spoon;straw;fed by clinician   Pre-spillage Yes   Location of Pre-spillage Bilaterally around " "epiglottis into pyriforms   Penetration? No   Residuals All residuals cleared with cueing   Aspiration? No   Epiglottic inversion Yes   FEES Eval: Puree Solid Texture Trial   Mode of Presentation, Puree spoon;fed by clinician   Pre-spillage Yes   Location of Pre-spillage Valleculae   Penetration? No   Aspiration? No   Epiglottic inversion Yes   Post swallow residuals Valleculae   Residuals in laryngeal vestibule No   Swallow Compensations   Swallow Compensations Pacing;Reduce amounts;Effortful swallow   Esophageal Phase of Swallow   Patient reports or presents with symptoms of esophageal dysphagia Yes   Esophageal comments Hx of GERD   Swallow Eval: Clinical Impressions   Skilled Criteria for Therapy Intervention Skilled criteria met.  Treatment indicated.   Functional Assessment Scale (FAS) 5   Treatment Diagnosis Mild pharyngeal dysphagia   Diet texture recommendations NPO   Therapy Frequency Daily   Predicted Duration of Therapy Intervention (days/wks) 1 week   Anticipated Discharge Disposition inpatient rehabilitation facility   Risks and Benefits of Treatment have been explained. Yes   Patient, family and/or staff in agreement with Plan of Care Yes   Clinical Impression Comments Patient presents with mild pharyngeal dysphagia characterized by premature pharyngeal entry of liquids to pyriform sinus level and puree to valleculae.  No aspiration noted.  No significant pharyngeal residue. Paged Dr. Tse with diet recommendations: full liquids (thin liquids ok) with 1:1 feeding assistance, however, defer to GI surgery team for diet advancement.  Recommend patient eat only when alert, up to chair whenever possible, small bites/sips.  Dentures not present in room, recommend obtaining those from home for diet advancement, as patient reports \"always\" eating with dentures in at home.    Total Evaluation Time   Total Evaluation Time (Minutes) 45     "

## 2020-01-14 NOTE — PLAN OF CARE
A/Ox4. VSS. Bipap at HS, otherwise on 3lpm NC. LS diminished. +Bs, +flatus. Suprapubic catheter patent with adequate output. G-tube drain patent, malecot drain patent. THAI patent no output during shift. TF at goal rate. Dressings changed to all sites x1. Repositioned q2hr. Up with assist x2 and lift.    unknown

## 2020-01-14 NOTE — PROGRESS NOTES
Consulted with patient re: plan for FEES (fiberoptic endoscopic evaluation of swallowing) at bedside today at 1030. Patient largely not alert, but in agreement with plan before falling back asleep.  Communicated with PT re: positioning plan for either chair or right side of bed for 1030 study.

## 2020-01-14 NOTE — PLAN OF CARE
"Discharge Planner SLP   Patient plan for discharge: Not stated.   Current status: Fiberoptic endoscopic evaluation of swallowing (FEES) completed in room. Patient presents with mild pharyngeal dysphagia characterized by premature pharyngeal entry of liquids to pyriform sinus level and puree to valleculae.  No aspiration noted.  No significant pharyngeal residue. Paged Dr. Tse with diet recommendations: full liquids (thin liquids ok) with 1:1 feeding assistance, however, defer to GI surgery team for diet advancement.  Recommend patient eat only when alert, up to chair whenever possible, small bites/sips.  Dentures not present in room, recommend obtaining those from home for diet advancement, as patient reports \"always\" eating with dentures in at home.   Barriers to return to prior living situation: Weakness, nutrition status.   Recommendations for discharge: TCU  Rationale for recommendations: SLP follow up for dysphagia for safe return to least restrictive diet.        Entered by: Asia Salinas 01/14/2020 11:31 AM      "

## 2020-01-15 ENCOUNTER — APPOINTMENT (OUTPATIENT)
Dept: PHYSICAL THERAPY | Facility: CLINIC | Age: 65
DRG: 853 | End: 2020-01-15
Payer: MEDICARE

## 2020-01-15 LAB
ANION GAP SERPL CALCULATED.3IONS-SCNC: 1 MMOL/L (ref 3–14)
BUN SERPL-MCNC: 30 MG/DL (ref 7–30)
CALCIUM SERPL-MCNC: 9.6 MG/DL (ref 8.5–10.1)
CHLORIDE SERPL-SCNC: 98 MMOL/L (ref 94–109)
CO2 SERPL-SCNC: 40 MMOL/L (ref 20–32)
CREAT SERPL-MCNC: 0.77 MG/DL (ref 0.52–1.04)
ERYTHROCYTE [DISTWIDTH] IN BLOOD BY AUTOMATED COUNT: 16.1 % (ref 10–15)
GFR SERPL CREATININE-BSD FRML MDRD: 82 ML/MIN/{1.73_M2}
GLUCOSE BLDC GLUCOMTR-MCNC: 131 MG/DL (ref 70–99)
GLUCOSE BLDC GLUCOMTR-MCNC: 134 MG/DL (ref 70–99)
GLUCOSE BLDC GLUCOMTR-MCNC: 150 MG/DL (ref 70–99)
GLUCOSE BLDC GLUCOMTR-MCNC: 186 MG/DL (ref 70–99)
GLUCOSE BLDC GLUCOMTR-MCNC: 196 MG/DL (ref 70–99)
GLUCOSE SERPL-MCNC: 169 MG/DL (ref 70–99)
HCT VFR BLD AUTO: 30.9 % (ref 35–47)
HGB BLD-MCNC: 9.7 G/DL (ref 11.7–15.7)
MCH RBC QN AUTO: 28.5 PG (ref 26.5–33)
MCHC RBC AUTO-ENTMCNC: 31.4 G/DL (ref 31.5–36.5)
MCV RBC AUTO: 91 FL (ref 78–100)
PLATELET # BLD AUTO: 206 10E9/L (ref 150–450)
POTASSIUM SERPL-SCNC: 3.4 MMOL/L (ref 3.4–5.3)
RBC # BLD AUTO: 3.4 10E12/L (ref 3.8–5.2)
SODIUM SERPL-SCNC: 139 MMOL/L (ref 133–144)
WBC # BLD AUTO: 15.3 10E9/L (ref 4–11)

## 2020-01-15 PROCEDURE — 94640 AIRWAY INHALATION TREATMENT: CPT | Mod: 76

## 2020-01-15 PROCEDURE — 25000128 H RX IP 250 OP 636: Performed by: INTERNAL MEDICINE

## 2020-01-15 PROCEDURE — 25000131 ZZH RX MED GY IP 250 OP 636 PS 637: Mod: GY | Performed by: INTERNAL MEDICINE

## 2020-01-15 PROCEDURE — G0463 HOSPITAL OUTPT CLINIC VISIT: HCPCS

## 2020-01-15 PROCEDURE — 25000132 ZZH RX MED GY IP 250 OP 250 PS 637: Mod: GY | Performed by: INTERNAL MEDICINE

## 2020-01-15 PROCEDURE — 25000125 ZZHC RX 250: Performed by: INTERNAL MEDICINE

## 2020-01-15 PROCEDURE — 97530 THERAPEUTIC ACTIVITIES: CPT | Mod: GP | Performed by: PHYSICAL THERAPIST

## 2020-01-15 PROCEDURE — 40000275 ZZH STATISTIC RCP TIME EA 10 MIN

## 2020-01-15 PROCEDURE — 80048 BASIC METABOLIC PNL TOTAL CA: CPT | Performed by: INTERNAL MEDICINE

## 2020-01-15 PROCEDURE — 94645 CONT INHLJ TX EACH ADDL HOUR: CPT

## 2020-01-15 PROCEDURE — 94640 AIRWAY INHALATION TREATMENT: CPT

## 2020-01-15 PROCEDURE — 94660 CPAP INITIATION&MGMT: CPT

## 2020-01-15 PROCEDURE — 40000239 ZZH STATISTIC VAT ROUNDS

## 2020-01-15 PROCEDURE — 85027 COMPLETE CBC AUTOMATED: CPT | Performed by: INTERNAL MEDICINE

## 2020-01-15 PROCEDURE — 00000146 ZZHCL STATISTIC GLUCOSE BY METER IP

## 2020-01-15 PROCEDURE — 99232 SBSQ HOSP IP/OBS MODERATE 35: CPT | Performed by: INTERNAL MEDICINE

## 2020-01-15 PROCEDURE — 27210437 ZZH NUTRITION PRODUCT SEMIELEM INTERMED LITER

## 2020-01-15 PROCEDURE — 12000000 ZZH R&B MED SURG/OB

## 2020-01-15 RX ORDER — FUROSEMIDE 20 MG
20 TABLET ORAL DAILY
Status: DISCONTINUED | OUTPATIENT
Start: 2020-01-15 | End: 2020-01-15

## 2020-01-15 RX ORDER — PRAVASTATIN SODIUM 20 MG
20 TABLET ORAL DAILY
Status: DISCONTINUED | OUTPATIENT
Start: 2020-01-15 | End: 2020-01-21 | Stop reason: HOSPADM

## 2020-01-15 RX ORDER — ACETYLCYSTEINE 200 MG/ML
2 SOLUTION ORAL; RESPIRATORY (INHALATION) DAILY PRN
Status: DISCONTINUED | OUTPATIENT
Start: 2020-01-15 | End: 2020-01-21 | Stop reason: HOSPADM

## 2020-01-15 RX ORDER — FUROSEMIDE 20 MG
20 TABLET ORAL DAILY
Status: DISCONTINUED | OUTPATIENT
Start: 2020-01-15 | End: 2020-01-18

## 2020-01-15 RX ADMIN — INSULIN GLARGINE 20 UNITS: 100 INJECTION, SOLUTION SUBCUTANEOUS at 07:51

## 2020-01-15 RX ADMIN — GABAPENTIN 800 MG: 250 SOLUTION ORAL at 22:41

## 2020-01-15 RX ADMIN — AMLODIPINE BESYLATE 5 MG: 5 TABLET ORAL at 08:06

## 2020-01-15 RX ADMIN — ALBUTEROL SULFATE 2.5 MG: 2.5 SOLUTION RESPIRATORY (INHALATION) at 11:46

## 2020-01-15 RX ADMIN — GABAPENTIN 800 MG: 250 SOLUTION ORAL at 17:40

## 2020-01-15 RX ADMIN — ALBUTEROL SULFATE 2.5 MG: 2.5 SOLUTION RESPIRATORY (INHALATION) at 18:01

## 2020-01-15 RX ADMIN — FUROSEMIDE 20 MG: 20 TABLET ORAL at 11:40

## 2020-01-15 RX ADMIN — OXYCODONE HYDROCHLORIDE 5 MG: 5 TABLET ORAL at 13:23

## 2020-01-15 RX ADMIN — METHADONE HYDROCHLORIDE 20 MG: 10 TABLET ORAL at 20:52

## 2020-01-15 RX ADMIN — INSULIN ASPART 3 UNITS: 100 INJECTION, SOLUTION INTRAVENOUS; SUBCUTANEOUS at 13:20

## 2020-01-15 RX ADMIN — METHADONE HYDROCHLORIDE 10 MG: 10 TABLET ORAL at 13:46

## 2020-01-15 RX ADMIN — ALBUTEROL SULFATE 2.5 MG: 2.5 SOLUTION RESPIRATORY (INHALATION) at 15:39

## 2020-01-15 RX ADMIN — HYDROMORPHONE HYDROCHLORIDE 0.5 MG: 1 INJECTION, SOLUTION INTRAMUSCULAR; INTRAVENOUS; SUBCUTANEOUS at 06:29

## 2020-01-15 RX ADMIN — LEVOTHYROXINE SODIUM 88 MCG: 88 TABLET ORAL at 07:53

## 2020-01-15 RX ADMIN — ENOXAPARIN SODIUM 40 MG: 40 INJECTION SUBCUTANEOUS at 17:40

## 2020-01-15 RX ADMIN — Medication 40 MG: at 07:54

## 2020-01-15 RX ADMIN — METHADONE HYDROCHLORIDE 20 MG: 10 TABLET ORAL at 07:53

## 2020-01-15 RX ADMIN — INSULIN ASPART 1 UNITS: 100 INJECTION, SOLUTION INTRAVENOUS; SUBCUTANEOUS at 08:36

## 2020-01-15 RX ADMIN — ALBUTEROL SULFATE 2.5 MG: 2.5 SOLUTION RESPIRATORY (INHALATION) at 00:33

## 2020-01-15 RX ADMIN — GABAPENTIN 800 MG: 250 SOLUTION ORAL at 08:07

## 2020-01-15 RX ADMIN — PRAVASTATIN SODIUM 20 MG: 20 TABLET ORAL at 11:40

## 2020-01-15 RX ADMIN — ALBUTEROL SULFATE 2.5 MG: 2.5 SOLUTION RESPIRATORY (INHALATION) at 08:21

## 2020-01-15 RX ADMIN — GABAPENTIN 800 MG: 250 SOLUTION ORAL at 00:20

## 2020-01-15 RX ADMIN — OXYCODONE HYDROCHLORIDE 5 MG: 5 TABLET ORAL at 17:54

## 2020-01-15 RX ADMIN — INSULIN ASPART 2 UNITS: 100 INJECTION, SOLUTION INTRAVENOUS; SUBCUTANEOUS at 21:00

## 2020-01-15 RX ADMIN — ACETYLCYSTEINE 4 ML: 200 SOLUTION ORAL; RESPIRATORY (INHALATION) at 00:34

## 2020-01-15 RX ADMIN — Medication 40 MG: at 17:40

## 2020-01-15 RX ADMIN — FUROSEMIDE 20 MG: 10 INJECTION, SOLUTION INTRAMUSCULAR; INTRAVENOUS at 00:20

## 2020-01-15 ASSESSMENT — ACTIVITIES OF DAILY LIVING (ADL)
ADLS_ACUITY_SCORE: 29
ADLS_ACUITY_SCORE: 30
ADLS_ACUITY_SCORE: 29

## 2020-01-15 NOTE — PROGRESS NOTES
Surgery    Doing better. No abdominal pain. Brandon TF and having BM's. Drains in tact with appropriate output.    Abd- Soft. Non-tender. Minimal tenderness. Drains in place. THAI serous. Malecot- Bilious    A/P  1-Will need to stay on clear indefinitely due to size of perforation and drain placement. Cont tube feeds.  2-Agree with consultants regarding anticoagulation. Would be at high risk of bleeding as she had signifcant bleeding from ulcer when she was started on heparin.   3-Appears ready for TCU from surgical prospective.    Ayan Lopez M.D.  [facility]  [phone]

## 2020-01-15 NOTE — PLAN OF CARE
Vital signs stable on BIPAP overnight. Alert and oriented. Lung sounds diminished. Bowel sounds active, flatus present. Drain dressings clean dry and intact. Midline incision open to air. Pain controlled with dilaudid x1. Up with lift. Tolerating clears prior to BIPAP. CMS/neuros intact. Turned and repo q2h. Mepilex on coccyx. Suprapubic catheter with 2 L out.

## 2020-01-15 NOTE — PROGRESS NOTES
"[facility] Nurse Inpatient Wound Assessment   Reason for consultation: Evaluate and treat:  Bilateral posterior upper thighs/ gluteal folds    Assessment  - sacrum, coccyx, gluteal cleft intact without erythema.   - posterior bilateral upper thighs/ gluteal folds with significant amount of loose skin from significant weight loss.  This significantly loose tissue is rubbing, shearing, pressing on itself and causing areas of superficial blisters that are clean, no s/s of infection but can be very painful, currently the right side is very tender.       Will use large Mepilex Sacral dressings to support the tissue.  Staff to continue their excellent care with cleaning and baby powder to reduce friction and chaffing.  Discussed with Dr. Tse that I would recommend pt be evaluated for removal this extra tissue, is causing wounds and pain.  Pt has other, significant health issue that would take a priority but if /when the time is right would like to suggest pt being evaluated by surgery       Treatment Plan  Plan of care for posterior upper thighs/ gluteal fold AND COCCYX- every other day and prn  1. Clean tissue with saline and pat dry  2. Paint entire area where you want a Mepilex Dressing to go, with skin prep.  Use several skin preps, and dry.  Do not skip this step!  This will clean skin of greasy wipes, etcperiwound tissue with No Sting Skin Prep (#[id number]) and allow to dry thoroughly.  You will need an extra hand to keep tissue flat and smooth, don't pull tissue, just flatten it  3. Press a Mepilex  Sacral Dressing (PS#[id number])  to each thigh.  The orientation that may work the best is with the point of the hear pointing laterally, then you will get really nice vertical coverage.  You want to use a bigger dressing to really support the skin.       Press a Mepilex  Sacral Dressing (PS#[id number])  to the coccyx, making sure to conform nicely to skin curvatures (begin placing the Mepilex \"upside down\" " "as high on the coccyx as able, then begin the placement of the dressing at the most distal aspect, smooth upward along the gluteal cleft and then side to side.  NOTE- begin placement as high as possible and may be positioned off-set or not \"square\" to the body) the area, making sure to conform nicely to skin curvatures   4. Time and date dressing change   NOTE- continue to assess under dressing and document findings BID, per protocolPIP measures:  [POSITION PT SIDE TO SIDE ONLY WHEN IN BED, EVEN WITH A \"ROTATIONAL\" MATTRESS.  THE ROTATION IS NOT SIGNIFICANT ENOUGH TO GET A TRUE TURN IN, SO YOU STILL NEED TO TURN AND PUT PILLOWS BEHIND BACK AND LEGS.  - KEEP HEELS ELEVATED AT ALL TIMES, as MANY as YOU NEED TO KEEP HER HEELS FLOATING AND PAINFUL KNEE IN GOOD ALIGNMENT    Perineal cares-   Carefully clean with Eduin Cleanse and Protect, getting into all the folds, etc, dry  Dust with baby powder to reduce chaffing and moisture  Position side to side only every 2, hours, even with a rotational mattress  Keep heels elevated at all times.     Orders In Epic  WOC Nurse follow-up plan: weekly   Nursing to notify the Provider(s) and re-consult the WOC Nurse if wound(s) deteriorates or new skin concern.    Patient History  According to provider note(s):  Bhavani White is a 64 year old female with hx of DM2, chronic cystitis s/p chronic SP catheter, chronic pain on methadone maintenance, asthma, ANIYA, and hypothyroidism who was admitted to the ICU service on 12/29/2019 for a perforated duodenal ulcer with associated septic shock and bowel ischemia. She is s/p ex-lap x2, but perforation could not be entirely repaired due to size; she is s/p THAI drains and GJ tube placement. Hospital course has otherwise been complicated by stress cardiomyopathy. She was weaned off pressors on 1/5 and successfully extubated on 1/6. She was transferred the the Hospitalist service on 1/6.      Acute metabolic encephalopathy, " Improving  Multifactorial due to hemorrhagic/septic shock, ICU delirium. She was started on scheduled Seroquel while in the ICU which was discontinued with improvement in her mental status   - Currently alert and oriented  - Seroquel 25 mg TID PRN for anxiety, agitation     Perforated duodenal ulcer with hemorrhagic shock and bowel ischemia, s/p ex lap, TRUONG, Malecot drain placement in perforation, and wound VAC on 12/29/2019, repeat ex-lap, TRUONG, closure of duodenal perforation over Malecot, G-tube/J-tube placements, and delayed primary closure on 1/1/2020    Objective Data  Containment of urine/stool: SP for UIC and Incontinence protocol for FIC    Active Diet Order:  Orders Placed This Encounter      Clear Liquid Diet    Output:   I/O last 3 completed shifts:  In: 4020 [P.O.:1140; NG/GT:2160]  Out: 6470 [Urine:6100; Emesis/NG output:315; Drains:55]    Risk Assessment:   Sensory Perception: 3-->slightly limited  Moisture: 3-->occasionally moist  Activity: 2-->chairfast  Mobility: 2-->very limited  Nutrition: 2-->probably inadequate  Friction and Shear: 2-->potential problem  Joe Score: 14                          Labs:   Recent Labs   Lab 01/15/20  0645  01/13/20  0545   ALBUMIN  --   --  1.3*   HGB 9.7*   < > 9.1*   WBC 15.3*   < > 16.7*    < > = values in this interval not displayed.       Physical Exam  Skin inspection: Coccyx/sacrum and bilateral  posterior thighs    January 15, 2020    Sacrum, coccyx, gluteal cleft intact, some blanchable erythema and scar tissue noted  Bilateral posterior upper thighs with slightly moist, popped blisters, loose epidermis that is dry, devitalized skin flaps.  Each side is about 5cm x 3cm, the right is painful      Interventions  Current support surface Bariatric pulsate  Current off-loading measures: pillows / chair cushion  Visual inspection of wound(s) completed  Wound Care: Meplilex sacral in sacrum and bilateral posterior thighs for prevention  Supplies: In pt room    Education provided: Discussed importance of repositioning and HOB below 30 degrees       Paty Sanchez, RN  CWOC Nurse

## 2020-01-15 NOTE — PROGRESS NOTES
"Surgery    Patient seen and examined.   Getting stronger.   Tube outputs appropriate.   Agree with student note and plan below.    Juan Boyle PA-C    General Surgery--PA Student    Alert, oriented, and appropriately responsive.  Still having shortness of breath but improved with daily Neb treatments.  Slept OK last night and denied current pain.  Arms and legs continue to feel \"heavy\" but movement of arms improved.    /65 (BP Location: Left arm)   Pulse 74   Temp 98  F (36.7  C) (Oral)   Resp 18   Wt (!) 168.7 kg (371 lb 14.7 oz)   SpO2 94%   BMI 62.85 kg/m      Exam:  Resp: Non-labored, shallow, BS clear diminished throughout bilateral.  Heart: RRR without M/R/G or rubs.  Abdomen: Soft, non-distened, non-tender to palpation. Midline abdomen incision open to air, clean and dry. Right THAI drain removed. G tube with creamy white appearing drainage. All other drains in place with clean dry dressings.  Extremities: Bilateral elbow flexion and arm flexion/abduction improved, almost able to reach face bilateral. Bilateral  strength still weak but improved from yesterday. No LE edema or pain with foot and ankle movement bilateral.    Labs:  No new labs available, WBC 16.7 1/14/2020  No ne lab available, Hgb 9.7 1/14/2020    I/O 1/14/2020 0700--1/15/2020 0700:   TP infusing at goal rate  G tube output increased 175mL to 215mL nothing in tube currently  THAI output decreased 10mL to 5mL light serous  Malecot output decreased 100mL to 50mL tan bilious    Assessment/Plan:  Perforated duodenal ulcer with hemorrhagic shock and bowel ischemia, s/p ext lap, TRUONG, Malecot drain placement with perforation, and wound VAC on 12/29/2019. Repeat ex-lap, TRUONG, closure of duodenal perforation over Malecot, G-tube/J-tube placements, delayed primary closure on 1/1/2020.     - Continue clear liquids, tube feedings, and drains   - Continue monitor drains, WBC, HGB   - Continue PT/OT and patient strengthening of arms and " legs   - Continue Neb treatments Q4 hours for shortness of breath   - TCU when available and cleared by hospitalist    ALIZA DubonS

## 2020-01-15 NOTE — CONSULTS
Care Transition Initial Assessment - SW     Met with: PATIENT,FAMILY  Principal Problem:    Duodenal ulcer perforation (H)  Active Problems:    Elevated BMI    Perforated duodenal ulcer (H)    Acute respiratory failure with hypoxia (H)    NSTEMI (non-ST elevated myocardial infarction) (H)    Septic shock (H)       DATA  Lives With: spouse   Description of Support System: Supportive, Involved  Who is your support system?:   Support Assessment: Adequate family and caregiver support.   Identified issues/concerns regarding health management: SW following for discharge needs    ASSESSMENT  Cognitive Status: Alert and oriented  Concerns to be addressed: Patient is a 64 year old female who was admitted to the hospital for duodenal ulcer. Prior to hospitalization patient was living at home with spouse who has dementia at baseline. Per Sarmad, patient's Brother in law, states that patient is w/c bound and uses an electric w/c at baseline. 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 Patient is her spouses caregiver and does all med management with patient. Patient is currently at home alone with no support. Sarmad shares that he has been trying to contact [facility] to get additional resources/support in the home. Sarmad has had difficulty in connecting with someone. SW provided Sarmad with additional numbers for [facility] along with [employer] resources. Patient has very limited support. Patient has one son who is  and one son who is out of state. Per Sarmad, patient and spouse have isolated themselves and do not allow others to get involved in personal issues. Sarmad is concerned about patient being home alone as he cannot manage his own medications due to impaired cognition. Sergio, pt's spouse, also drives and often times forgets how he got places.    SW met with patient to discuss discharge plan. Patient is aware that TCU is being recommended  for discharge. Patient is agreeable to this plan and requests a referral be sent to [facility] and [facility]. SW will update patient once assessments are complete.     SW will make a vulnerable adult report regarding patient's spouse as he is currently at home alone and not able to independently manage medications due to impaired cognition.   Vulnerable adult web-report number:[id number]    [facility].      PLAN  Financial costs for the patient includes   Patient given options and choices for discharge to TCU  Patient/family is agreeable to the plan?  YES  Transportation/person available to transport on day of discharge  is a stretcher  and have they been notified/set up -needs to be arranged  Patient Goals and Preferences: [facility] and [facility]  Patient anticipates discharging to: TCU    SARAH Hernandez, LGSW  [phone]  [facility]

## 2020-01-15 NOTE — PROGRESS NOTES
[facility]  Hospitalist Progress Note    Date of admission: 12/29/2019  Date of Service (when I saw the patient): 01/15/2020      Interval History   She denies any pain. No N/V. She is very hesiatant to take any oral intake. Had 5liters urine out put yesterday     Assessment & Plan   Bhavani White is a 64 year old female with hx of DM2, chronic cystitis s/p chronic SP catheter, chronic pain on methadone maintenance, asthma, ANIYA, and hypothyroidism who was admitted to the ICU service on 12/29/2019 for a perforated duodenal ulcer with associated septic shock and bowel ischemia. She is s/p ex-lap x2, but perforation could not be entirely repaired due to size; she is s/p THAI drains and GJ tube placement. Hospital course has otherwise been complicated by stress cardiomyopathy. She was weaned off pressors on 1/5 and successfully extubated on 1/6. She was transferred the the Hospitalist service on 1/6.     Acute metabolic encephalopathy, Improving  Multifactorial due to hemorrhagic/septic shock, ICU delirium. She was started on scheduled Seroquel while in the ICU which was discontinued with improvement in her mental status   - Currently alert and oriented      Perforated duodenal ulcer with hemorrhagic shock and bowel ischemia, s/p ex lap, TRUONG, Malecot drain placement in perforation, and wound VAC on 12/29/2019, repeat ex-lap, TRUONG, closure of duodenal perforation over Malecot, G-tube/J-tube placements, and delayed primary closure on 1/1/2020  * Presented with 3 day history of progressive abdominal pain, nausea, and vomiting. En route, she was noted by EMS to vomit feculent material with associated aspiration, and was hypotensive on arrival. She was intubated in the ED, initiated on 4 pressor agents, and stress-dose steroids. CT chest/abd/pelvis on arrival showed RML infiltrate consistent with aspiration and evidence of perforated duodenum. She was initiated on IV pip-tazo, and taken emergent to the OR by  General Surgery   * 12/29-12/30: s/p ex lap, TRUONG, Malecot placement in perforation, and TAC. Gastric and duodenal ischemia and hemorrhage noted, no obvious leak. Blood was evacuated and drain placed.    * Returned to OR on 1/1 for repeat ex lap, TRUONG, closure of duodenal perf over Malecot, and GJ tube placement. Unable to completely repair perforation due to size. THAI drains placed for containment. Bowel ischemia improved on visualization.  * Weaned off pressors on 1/5  * Repeat CT abd/pelvis on 1/11 showed surgical drains in place with only a few tiny foci of remaining air and no abscess. R THAI drain removed on 1/12  - Post-operative management as per General Surgery: Left THAI drain in place.  - On tube feeds. Remains NPO with sips of ice chips/clears. Advance as per Gen Surgery/SLP  - Completed 14-day course of pip-tazo on 1/11  - Completed 7-day hydrocortisone taper (last day: 1/13)  - Continues on pantoprazole 40 mg PO daily   - Holding PTA aspirin due to PUD/GI hemorrhage  GI consult recommended holding off on dual antiplatelets atleast for 2months if possible to let the ulcer heal   So cardioogy recommended out pt follow up in clinic     Acute hypoxic respiratory failure, multifactorial  * Due to aspiration pneumonia on admission and was found to have complete atelectasis of the right lung and small to moderate pleural effusion per CT on 1/11. Patient needs aggressive pulm toilet, but this is limited by encephalopathy and profound weakness.   * Completed 14-day course of IV pip-tazo on 1/11  * Trial of nebs and RT assistance with pulm toilet  - WBC remained elevated but patient afebrile with no new s/sx of infection. High risk for recurrent pneumonia. WBC slightly better today 1/13, suspect this is partly also due to steroid. Monitor for fever or clinical signs of infection. Hold off on further abx as clinically improving and just completed extensive course.   - On albuterol nebs q4h while awake, mucomyst nebs  QID, and percussion therapy per RT  - Encourage incentive spirometry, flutter valve, RCAT as able  - Noted several lbs of weight gain, weight is likely not accurate but it overall reflects markedly increased weight. CT abd pelvis 1/12 picked up at least moderate pleural effusion on Right. Lasix 20 mg once IV, if tolerates and good output, will place 20-40 mg IV daiy from am.   - Also will get US Rt chest to eval for effusion, will plan thoracentesis if adequate fluid.   - On 5-6 lpm O2 per NC, wean as tolerated  Started on lasix 20mg IV BID today to help with gentle diuresis will change to 20mg daily via G tube today     Septic shock with Staph hominis bacteremia, Improving to resolved  Aspiration event noted en route to the ED. AMS, hypotension, leukocytosis (20.5k), elevated LFT present on arrival. 1 out of 2 blood cultures on admission grew Staph hominis, and arterial line on 1/2 also grew Staph hominis. This was removed upon return of blood cultures. She initially required 4 pressor agents and stress dose steroids which were gradually weaned; has been off pressors since 1/5.   She has been on the following anti-infective regimen:  * 12/29-1/2: IV vancomycin  * 12/30-1/6: IV meropenem  * 12/30-1/2: IV fluconazole for fungal prophylaxis  * Completed 14 day course of IV pip-tazo on 1/11  - completed 7-day hydrocortisone taper (last day: 1/13)  - Repeat blood culture on 1/4: NGTD    Acute blood loss anemia   Baseline Hgb 16-19, decreased to 9-10 range from perforated ulcer and surgical losses.   - Hgb stable in 9-10 range, will follow     Elevated troponin due to suspected demand ischemia, initial concern for NSTEMI  Chronic systolic CHF due to stress cardiomyopathy  Initial troponin on arrival was elevated to 19.4 and subsequently trended down. EKG showed RBBB with septal Q waves. TTE (12/29) showed LVEF 30-40% with global hypokinesis predominantly involving the apex. Clinical presentation was felt by Cardiology to  be consistent with stress cardiomyopathy. She was initially treated with IV heparin, but this was discontinued within 24 hours    - Clinically appears compensated, euvolemic  - Consider repeat TTE and ischemia work-up once more clinically stable    - Holding aspirin due to duodenal ulcer  - restart  statin  Today as LFTS improved   - Cardiology reconsulted today   Repeat echo showed EF normalized at 60%   [facility] consulted as per GI with good to let the ulcer here for 2 months before starting dual antiplatelet agents  So cardiology planning on outpatient follow-up with a stress MRI to look for any ischemia  Coronary angiogram not advised during this hospitalization  DM2 with peripheral neuropathy   * A1c 7.1. PTA on metformin 500 mg BID.   * Treated with insulin gtt 12/31-1/9  - BS stable and controlled with Lantus 20 units daily today. May need to further decrease dose as completed steroid today. Monitor for hypoglycemia.    - High SSI available     ROLANDO stage II, Improved  Creatinine up to 2.5 from baseline 0.7-0.8. Suspect pre-renal state due to shock. Renal function improved with aggressive fluid resuscitation and blood pressure management.  - Creatinine appears to have plateaued in 0.9-1 range  Started on lasix 20mg IV BID for gentle diuresis switched  to Lasix 20 mg via G-tube daily today    Elevated transaminases due to septic shock  AST 1044, ALT 1363 on arrival. Improved with resolution of septic shock  - LFTs appear to have plateaued: AST 70s, ALT 90s. Will follow periodically       Chronic pain syndrome  * PTA methadone 20/10/20 mg TID, gabapentin 1600 mg TID, tizanidine 4 mg TID  - Continues on PTA methadone  - PTA gabapentin has been decreased to 800 mg TID in the setting of AMS and recent ROLANDO  - Holding PTA tizanidine  - Oxycodone, IV Dilaudid PRN      History of chronic cystitis s/p chronic suprapubic catheter  - SP catheter in place  - Holding PTA oxybutynin and pyridium.      Stage 2 pressure ulcer on  left buttocks  Stage 1 pressure ulcers on sacrum and LLE  Present on admission  - Wound cares in place as per WOCN     Essential hypertension  * PTA: lisinopril 5 mg daily.    - Holding lisinopril in setting of recent shock and ROLANDO  - She has been started on amlodipine 5 mg daily, will follow blood pressure and titrate accordingly.- Labetalol PRN for SBP>175  Blood pressure better controlled with systolics in the 110s to 140s today  Hyperlipidemia  - Holding PTA simvastatin due to elevated LFTs        Hypothyroidism:  - Continues on PTA levothyroxine 88 mcg daily.         Mild intermittent asthma  PTA on albuterol, levalbuterol PRN  - Bronchodilators available PRN     History of ANIYA  Reportedly no longer on PAP therapy  - Currently on biPAP qhs        Morbid obesity  BMI 63    FEN: NPO, on tube feeds  Drains/Tubes: SP catheter (chronic), THAI drain x1, G and J tubes (1/1)  Lines: RUE PICC, 1/5  DVT Prophylaxis: Enoxaparin (Lovenox) SQ  Code Status: Full Code    Disposition: Expected discharge: Once cleared by General Surgery, able to work with PT/OT..  Will need TCU. Discussed with patient and RN    Sherrie Tse MD  Hospitalist      -Data reviewed today: I reviewed all new labs and imaging results over the last 24 hours. I personally reviewed no images or EKG's today.    Physical Exam   Temp: 98.1  F (36.7  C) Temp src: Oral BP: (!) 142/63 Pulse: 74 Heart Rate: 74 Resp: 18 SpO2: 96 % O2 Device: None (Room air) Oxygen Delivery: 2 LPM  Vitals:    01/08/20 0400 01/12/20 0636 01/14/20 0644   Weight: (!) 168 kg (370 lb 6 oz) (!) 170 kg (374 lb 12.5 oz) (!) 168.7 kg (371 lb 14.7 oz)     Vital Signs with Ranges  Temp:  [98  F (36.7  C)-98.8  F (37.1  C)] 98.1  F (36.7  C)  Pulse:  [70-74] 74  Heart Rate:  [74] 74  Resp:  [18] 18  BP: (113-143)/(59-65) 142/63  FiO2 (%):  [30 %] 30 %  SpO2:  [92 %-97 %] 96 %  I/O last 3 completed shifts:  In: 3260 [P.O.:500; NG/GT:2040]  Out: 5620 [Urine:5350; Emesis/NG output:215;  Drains:55]    Constitutional: alert awake, oriented, pleasant. Not in distress.   Respiratory: Diminished breath sounds bilaterally no rales or wheezing heard today.    cardiovascular: Heart sounds distant, RRR. Legs are puffy, edema (+).   GI: +BS, abd soft, obese/distended. Dressing over incision in place. THAI drain in place serosanguinous fluid. G and J tubes in place.    : SP catheter in place draining clear, yellow urine  Skin/Integument: Abdominal incision dressing in place.  Musculoskeletal: edematous.   Neuro: Alert and appropriate. TORRES  Psych: Calm, cooperative with exam    Medications     dextrose       - MEDICATION INSTRUCTIONS -       - MEDICATION INSTRUCTIONS -       ACE/ARB/ARNI NOT PRESCRIBED       BETA BLOCKER NOT PRESCRIBED       STATIN NOT PRESCRIBED         albuterol  2.5 mg Nebulization Q4H While awake     amLODIPine  5 mg Oral or Feeding Tube Daily     enoxaparin ANTICOAGULANT  40 mg Subcutaneous Q24H     furosemide  20 mg Oral Daily     gabapentin  800 mg Oral or Feeding Tube TID     insulin aspart  1-12 Units Subcutaneous Q4H     insulin glargine  20 Units Subcutaneous QAM AC     levothyroxine  88 mcg Oral or Feeding Tube QAM AC     methadone  10 mg Oral or Feeding Tube Q24H     methadone  20 mg Oral or Feeding Tube Q12H KENIA     pantoprazole  40 mg Oral or Feeding Tube BID AC     pravastatin  20 mg Oral Daily     sodium chloride (PF)  10 mL Intracatheter Q8H     Data   Recent Labs   Lab 01/15/20  0645 01/14/20  0540 01/13/20  0545  01/10/20  0550   WBC 15.3* 16.7* 16.7*   < > 20.4*   HGB 9.7* 9.7* 9.1*   < > 9.6*   MCV 91 90 91   < > 92    222 194   < > 273    139 139   < > 147*   POTASSIUM 3.4 3.4 3.7   < > 4.2   CHLORIDE 98 100 103   < > 112*   CO2 40* 35* 32   < > 32   BUN 30 30 35*   < > 37*   CR 0.77 0.77 0.85   < > 1.05*   ANIONGAP 1* 4 4   < > 3   DORY 9.6 9.3 9.3   < > 8.5   * 150* 127*   < > 139*   ALBUMIN  --   --  1.3*  --  1.4*   PROTTOTAL  --   --  4.6*  --   4.5*   BILITOTAL  --   --  0.4  --  0.6   ALKPHOS  --   --  82  --  97   ALT  --   --  36  --  75*   AST  --   --  28  --  43    < > = values in this interval not displayed.       Recent Results (from the past 24 hour(s))   Echo Limited    Narrative    [id number]  [id number]  [id number]  [id number]^JACK^CLIF           [facility]  Echocardiography Laboratory  [address] [place] [address]        Name: MICHELLE RUIZ  MRN: [id number]  : 1955  Study Date: 2020 02:25 PM  Age: 64 yrs  Gender: Female  Patient Location: [facility]  Reason For Study: Cardiomyopathy  Ordering Physician: CLIF SANTIAGO  Performed By: Traci Gonzalez     BSA: 2.5 m2  Height: 64 in  Weight: 371 lb  HR: 72  BP: 135/76 mmHg  _____________________________________________________________________________  __        Procedure  Limited Portable Echo Adult.  _____________________________________________________________________________  __        Interpretation Summary     Technically challenging study.     Left ventricular systolic function is normal. LVEF is estimated at 60-65%.  Cannot assess wall motion accurately due to inability to use contrast imaging.  The right ventricle is not well visualized.  Right ventricular global function is probably normal.     This study was compared to a prior TTE from 2019, global biventricular  function has improved.  _____________________________________________________________________________  __        Left Ventricle  The left ventricle is normal in size. Left ventricular systolic function is  normal. The visual ejection fraction is estimated at 60-65%. Regional wall  motion abnormalities cannot be excluded due to limited visualization.     Right Ventricle  The right ventricle is not well visualized. Right ventricular global function  is probably normal.     Atria  Normal left atrial size. Right atrium not well visualized.     Mitral Valve  There is severe mitral annular  calcification. Calcified mitral apparatus.        Tricuspid Valve  The tricuspid valve is not well visualized, but is grossly normal. Right  ventricular systolic pressure could not be approximated due to inadequate  tricuspid regurgitation.     Aortic Valve  The aortic valve is not well visualized.     Pulmonic Valve  The pulmonic valve is not well seen, but is grossly normal.     Vessels  Inferior vena cava not well visualized for estimation of right atrial  pressure.     _____________________________________________________________________________  __        Doppler Measurements & Calculations  MV E max mikie: 103.0 cm/sec  MV A max mikie: 118.8 cm/sec  MV E/A: 0.87  MV dec time: 0.30 sec              _____________________________________________________________________________  __        Report approved by: Rosa Odom 01/14/2020 03:49 PM

## 2020-01-15 NOTE — PLAN OF CARE
Discharge Planner PT   Patient plan for discharge: None stated.   Current status: Dependent for bed mobility, is able to follow with attempt to reach across the body with rolling; however, unable to shift her body with out complete assist in supine. Donned XL universal sling for transfer bed>recliner for lunch. Pt tolerated well, facial grimace, but no screaming or yelling. Pt is weak in the UEs, recommend an OT consult for upper body strength and ADL training as PT works toward sitting balance and posture.   Barriers to return to prior living situation: Level of assist, impaired activity tolerance, strength and balance. Fall risk.   Recommendations for discharge: TCU  Rationale for recommendations: Pt will benefit from continued skilled rehab services to progress independence and safety with functional mobility.        Entered by: Pam Hendrickson 01/15/2020 12:53 PM

## 2020-01-15 NOTE — PROGRESS NOTES
Nebs given as ordered. LS are diminished t/o and pt is on RA. Will continue to follow.  Phong Roldan

## 2020-01-15 NOTE — PLAN OF CARE
A&O x4. VSS on 2L NC. Up with a lift and assist 2-3 people. LS coarse/diminished. BS faint, hypo. Flatus+, BM: smear today. Urine output adequate via suprapubic catheter. Incision with staples, open to air. THAI to bulb suction, minimal output. Gastric tube to gravity with bright blue output since swallow eval. Malecot drain to gravity output. Tube feeding at 60ml/hr continuous.   Clear liquids available, pt declined so far.  Skin folds cleansed, interdry applied. Later pt c/o of burning on her R thigh, upon assessment it seemed that the pain was more towards groin skin folds with a tiny blistered area. Area was cleansed and baby powder applied, CTM.  Frequent turn/repo.

## 2020-01-15 NOTE — PROGRESS NOTES
Echo reveals now normal ejection fraction.  So, in retrospect, the low EF and elevated troponin likely from septic shock.  Discussed with hospitalist, Dr. Tse.  We also reviewed gastroenterology notes and they want avoid antiplatelet agents for at least 2 months.  Given that the echocardiogram shows normalization of ejection fraction, no acute cardiac symptoms, not able to use antiplatelet agents for at least 2 months, I would recommend not proceeding with any angiography at this time.  Patient can be seen in cardiology clinic and we can consider a stress MRI to rule out any ischemia.  Patient has some coronary artery atherosclerosis on CT chest and therefore risk factor modification should be continued.  I will add a low-dose statin for now.  Follow LFTs.  We will arrange follow-up with cardiology.  Recall if questions

## 2020-01-16 ENCOUNTER — APPOINTMENT (OUTPATIENT)
Dept: SPEECH THERAPY | Facility: CLINIC | Age: 65
DRG: 853 | End: 2020-01-16
Payer: MEDICARE

## 2020-01-16 ENCOUNTER — HOME INFUSION (PRE-WILLOW HOME INFUSION) (OUTPATIENT)
Dept: PHARMACY | Facility: CLINIC | Age: 65
End: 2020-01-16

## 2020-01-16 LAB
ANION GAP SERPL CALCULATED.3IONS-SCNC: 3 MMOL/L (ref 3–14)
BUN SERPL-MCNC: 28 MG/DL (ref 7–30)
CALCIUM SERPL-MCNC: 9.4 MG/DL (ref 8.5–10.1)
CHLORIDE SERPL-SCNC: 99 MMOL/L (ref 94–109)
CO2 SERPL-SCNC: 39 MMOL/L (ref 20–32)
CREAT SERPL-MCNC: 0.73 MG/DL (ref 0.52–1.04)
ERYTHROCYTE [DISTWIDTH] IN BLOOD BY AUTOMATED COUNT: 15.9 % (ref 10–15)
GFR SERPL CREATININE-BSD FRML MDRD: 88 ML/MIN/{1.73_M2}
GLUCOSE BLDC GLUCOMTR-MCNC: 109 MG/DL (ref 70–99)
GLUCOSE BLDC GLUCOMTR-MCNC: 135 MG/DL (ref 70–99)
GLUCOSE BLDC GLUCOMTR-MCNC: 151 MG/DL (ref 70–99)
GLUCOSE BLDC GLUCOMTR-MCNC: 156 MG/DL (ref 70–99)
GLUCOSE BLDC GLUCOMTR-MCNC: 162 MG/DL (ref 70–99)
GLUCOSE BLDC GLUCOMTR-MCNC: 192 MG/DL (ref 70–99)
GLUCOSE SERPL-MCNC: 140 MG/DL (ref 70–99)
HCT VFR BLD AUTO: 30.3 % (ref 35–47)
HGB BLD-MCNC: 9.5 G/DL (ref 11.7–15.7)
MCH RBC QN AUTO: 28.2 PG (ref 26.5–33)
MCHC RBC AUTO-ENTMCNC: 31.4 G/DL (ref 31.5–36.5)
MCV RBC AUTO: 90 FL (ref 78–100)
PLATELET # BLD AUTO: 229 10E9/L (ref 150–450)
POTASSIUM SERPL-SCNC: 3.3 MMOL/L (ref 3.4–5.3)
POTASSIUM SERPL-SCNC: 3.8 MMOL/L (ref 3.4–5.3)
RBC # BLD AUTO: 3.37 10E12/L (ref 3.8–5.2)
SODIUM SERPL-SCNC: 141 MMOL/L (ref 133–144)
WBC # BLD AUTO: 15.1 10E9/L (ref 4–11)

## 2020-01-16 PROCEDURE — 25000132 ZZH RX MED GY IP 250 OP 250 PS 637: Mod: GY | Performed by: INTERNAL MEDICINE

## 2020-01-16 PROCEDURE — 25000128 H RX IP 250 OP 636: Performed by: INTERNAL MEDICINE

## 2020-01-16 PROCEDURE — 85027 COMPLETE CBC AUTOMATED: CPT | Performed by: INTERNAL MEDICINE

## 2020-01-16 PROCEDURE — 25000131 ZZH RX MED GY IP 250 OP 636 PS 637: Mod: GY | Performed by: INTERNAL MEDICINE

## 2020-01-16 PROCEDURE — 80048 BASIC METABOLIC PNL TOTAL CA: CPT | Performed by: INTERNAL MEDICINE

## 2020-01-16 PROCEDURE — 25000125 ZZHC RX 250: Performed by: INTERNAL MEDICINE

## 2020-01-16 PROCEDURE — 40000275 ZZH STATISTIC RCP TIME EA 10 MIN

## 2020-01-16 PROCEDURE — 92526 ORAL FUNCTION THERAPY: CPT | Mod: GN | Performed by: SPEECH-LANGUAGE PATHOLOGIST

## 2020-01-16 PROCEDURE — 12000000 ZZH R&B MED SURG/OB

## 2020-01-16 PROCEDURE — 40000239 ZZH STATISTIC VAT ROUNDS

## 2020-01-16 PROCEDURE — 00000146 ZZHCL STATISTIC GLUCOSE BY METER IP

## 2020-01-16 PROCEDURE — 99232 SBSQ HOSP IP/OBS MODERATE 35: CPT | Performed by: INTERNAL MEDICINE

## 2020-01-16 PROCEDURE — 94640 AIRWAY INHALATION TREATMENT: CPT | Mod: 76

## 2020-01-16 PROCEDURE — 84132 ASSAY OF SERUM POTASSIUM: CPT | Performed by: INTERNAL MEDICINE

## 2020-01-16 PROCEDURE — 94640 AIRWAY INHALATION TREATMENT: CPT

## 2020-01-16 PROCEDURE — 27210437 ZZH NUTRITION PRODUCT SEMIELEM INTERMED LITER

## 2020-01-16 RX ORDER — POTASSIUM CHLORIDE 1.5 G/1.58G
20 POWDER, FOR SOLUTION ORAL DAILY
Status: DISCONTINUED | OUTPATIENT
Start: 2020-01-16 | End: 2020-01-21

## 2020-01-16 RX ADMIN — FUROSEMIDE 20 MG: 20 TABLET ORAL at 09:58

## 2020-01-16 RX ADMIN — GABAPENTIN 800 MG: 250 SOLUTION ORAL at 21:37

## 2020-01-16 RX ADMIN — OXYCODONE HYDROCHLORIDE 5 MG: 5 TABLET ORAL at 12:52

## 2020-01-16 RX ADMIN — POTASSIUM CHLORIDE 20 MEQ: 1.5 POWDER, FOR SOLUTION ORAL at 12:11

## 2020-01-16 RX ADMIN — ALBUTEROL SULFATE 2.5 MG: 2.5 SOLUTION RESPIRATORY (INHALATION) at 10:53

## 2020-01-16 RX ADMIN — INSULIN GLARGINE 20 UNITS: 100 INJECTION, SOLUTION SUBCUTANEOUS at 10:02

## 2020-01-16 RX ADMIN — ALBUTEROL SULFATE 2.5 MG: 2.5 SOLUTION RESPIRATORY (INHALATION) at 15:42

## 2020-01-16 RX ADMIN — AMLODIPINE BESYLATE 5 MG: 5 TABLET ORAL at 09:58

## 2020-01-16 RX ADMIN — ALBUTEROL SULFATE 2.5 MG: 2.5 SOLUTION RESPIRATORY (INHALATION) at 00:20

## 2020-01-16 RX ADMIN — PRAVASTATIN SODIUM 20 MG: 20 TABLET ORAL at 09:57

## 2020-01-16 RX ADMIN — Medication 40 MG: at 09:58

## 2020-01-16 RX ADMIN — LEVOTHYROXINE SODIUM 88 MCG: 88 TABLET ORAL at 09:58

## 2020-01-16 RX ADMIN — INSULIN ASPART 3 UNITS: 100 INJECTION, SOLUTION INTRAVENOUS; SUBCUTANEOUS at 17:10

## 2020-01-16 RX ADMIN — INSULIN ASPART 1 UNITS: 100 INJECTION, SOLUTION INTRAVENOUS; SUBCUTANEOUS at 00:58

## 2020-01-16 RX ADMIN — POTASSIUM CHLORIDE 20 MEQ: 1.5 POWDER, FOR SOLUTION ORAL at 14:26

## 2020-01-16 RX ADMIN — INSULIN ASPART 1 UNITS: 100 INJECTION, SOLUTION INTRAVENOUS; SUBCUTANEOUS at 12:52

## 2020-01-16 RX ADMIN — METHADONE HYDROCHLORIDE 20 MG: 10 TABLET ORAL at 09:58

## 2020-01-16 RX ADMIN — INSULIN ASPART 1 UNITS: 100 INJECTION, SOLUTION INTRAVENOUS; SUBCUTANEOUS at 21:10

## 2020-01-16 RX ADMIN — METHADONE HYDROCHLORIDE 20 MG: 10 TABLET ORAL at 21:10

## 2020-01-16 RX ADMIN — ALBUTEROL SULFATE 2.5 MG: 2.5 SOLUTION RESPIRATORY (INHALATION) at 19:16

## 2020-01-16 RX ADMIN — GABAPENTIN 800 MG: 250 SOLUTION ORAL at 17:06

## 2020-01-16 RX ADMIN — Medication 40 MG: at 16:56

## 2020-01-16 RX ADMIN — ALBUTEROL SULFATE 2.5 MG: 2.5 SOLUTION RESPIRATORY (INHALATION) at 07:23

## 2020-01-16 RX ADMIN — GABAPENTIN 800 MG: 250 SOLUTION ORAL at 10:01

## 2020-01-16 RX ADMIN — ENOXAPARIN SODIUM 40 MG: 40 INJECTION SUBCUTANEOUS at 16:56

## 2020-01-16 RX ADMIN — METHADONE HYDROCHLORIDE 10 MG: 10 TABLET ORAL at 14:25

## 2020-01-16 RX ADMIN — INSULIN ASPART 2 UNITS: 100 INJECTION, SOLUTION INTRAVENOUS; SUBCUTANEOUS at 23:54

## 2020-01-16 RX ADMIN — OXYCODONE HYDROCHLORIDE 5 MG: 5 TABLET ORAL at 17:06

## 2020-01-16 ASSESSMENT — ACTIVITIES OF DAILY LIVING (ADL)
ADLS_ACUITY_SCORE: 31
ADLS_ACUITY_SCORE: 27
ADLS_ACUITY_SCORE: 32
ADLS_ACUITY_SCORE: 31

## 2020-01-16 NOTE — PROGRESS NOTES
[facility]    Received referral for  tube feedings.  Benefits verified, covered at 100%.  Will meet with patient to introduce home infusion services, review benefits and offer choice of providers.      Thank you for the referral.    JEN ShiN, [facility]  [phone]

## 2020-01-16 NOTE — PROGRESS NOTES
Therapy: Enteral TF  Insurance: Medicare & BCBS supplement  Patient meets Medicare criteria for Enteral TF, coverage with Medicare is 80% and her BCBS supplement plan will cover the remaining 20%.    IN reference to admission date 12/29/2019 [facility] Juarez to check Enteral TF coverage.     Please contact Intake with any questions, [phone] or In Basket pool, [facility] ([id number]).

## 2020-01-16 NOTE — PROGRESS NOTES
"Care Coordination:    Per SW Pt is refusing TCU and wanting to go home.  MD met with patient and she is \"adamantly not going to TCU\".  Will be discharging on Tube feeding when medically stable (1-3 days per MD). Benefit check sent to [facility].  CC to follow for discharge planning and home care needs.    Addendum 1500: Per [facility]: Patient Bhavani White meets Medicare criteria for enteral TF, between her Medicare and her BCBS supplement plan coverage will be 100%.    Adry from [facility] following for teaching.  [facility] aware that HC will be needed for patient.      Received a call from [facility] that Pt's  has been calling their office stating patient is coming home and needs [facility] to come out.  [facility] has explained to  that patient is not currently open to [facility] (cert ended 1/14).    Met with patient and explained role.  Reviewed recommendation for TCU but patient states she wants to go home.  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  Explained that [facility] will be following and coordinate HC.  Pt voiced understanding and agreement      Pt will need HE w/c ride at discharge. Aware of cost and agreed.  Pt requested CC call  to update.  Tried [phone] x2, VM not set up.  Other 2 numbers listed were out of service.  Explained that  had been repeatly calling HC and Unit RN's/SW for discharge plan. Pt denies that  has memory issues.      Continue to follow for discharge plan.       Melanie Bello RN BSN  Inpatient Care Coordination  [facility]  [phone]  "

## 2020-01-16 NOTE — PLAN OF CARE
A/O x 4, vss, a-febrile, c/o abdominal discomfort, oxycodone and methadone helping, up with ceiling lift to the chair, reposition in bed, patient has ashley drain , no output from drain, patient on tube feeding at 60 ml tolerating well, on clear liquids, needs assist with meals, moist abdominal folds, powder and inter dry cloth applied,  following for discharge planning, will continue to monitor.

## 2020-01-16 NOTE — PROGRESS NOTES
"SW  I: SW met with patient (RN present) to discuss TCU placement and to obtain more info regarding the methadone that patient receives. Patient shares that she has a provider (Sujey Ortega DO) who does prescribes methadone at the [facility] in [place] and receives methadone every 3 weeks. When discussing TCU placement, patient reports that she no longer wants to go to a TCU and that she would like to go home with Home Care. Patient was recently open to [facility] RN services prior to admissions. Patient is aware that the hospital care team is not recommending this plan and feels TCU is safest plan. SW expressed concern to patient regarding the assistance that patient will require at home and not being able to obtain that level of care at home. Patient feels her spouse can help care for her. SW endorses concern to patient as she is spouse's main caregiver. Patient states \"He can help me if I need it.\"     Patient will be requiring tube feedings at discharge. CC RN is running benefits to obtain cost information. CC RN will present cost to patient and coordinate this service for d/c.     If patient is adamant about d/cing home, home care will need to be reordered and arranged. SW will also need to complete a vulnerable adult report to [facility].     ADDENDUM  I; SW received a call from patient's spouse reporting that he wants patient home ASAP and that he can provide necessary care for patient. SW explained that currently patient is not medically stable for discharge and the MD is thinking 1-2 more days. Patient's spouse is frustrated by this response and prefers she come home sooner rather than later.       P: SW will continue to follow and assist as needed.    Heather Mitchell, SARAH, LGSW  [phone]  [facility]     "

## 2020-01-16 NOTE — PLAN OF CARE
A/O x4. VSS on RA. Scheduled Methadone given for pain. Tolerated Bipap for about 1 hour over night. Gastrostomy tube to gravity with minimal output over night, leaking tan drainage, dressing changed x2 over night. R Malecot to gravity with thick brown output. THAI drain to bulb suction with 0 mL out over night. GJ tube infusing tube feed at 60 mL/hr w/ 100 mL flush q4h. Abdominal incision closed with staples RUBÉN. Suprapubic catheter in place with adequate output. LS diminished. BS hypo. Denies N/V. Tolerating clears. Up w/ lift. Frequent turn and repo. Continue to monitor.

## 2020-01-16 NOTE — PLAN OF CARE
"Discharge Planner SLP   Patient plan for discharge: not discussed today  Current status: Patient seen for dysphagia treatment. Note one instance of strong coughing after sip of water by cup when fed by self likely due to incoordination and UE weakness/shakiness. No other overt aspiration signs occurred with additional sips of water by cup or straw. Patient able to demonstrate small single sips given min cues.     Recommend continue clear liquid (thin) diet per surgery team given 1:1 assist and swallow strategies (fully upright position and in chair when possible, small single sips/bites, straws okay). Patient to continue completion of effortful swallow exercises. Dentures not present in room, recommend obtaining those from home for diet advancement, as patient reports \"always\" eating with dentures in at home.     Barriers to return to prior living situation: Weakness, nutrition status.   Recommendations for discharge: TCU  Rationale for recommendations: SLP follow up for dysphagia for safe return to least restrictive diet.        Entered by: Thu Rasheed 01/16/2020 2:34 PM       "

## 2020-01-16 NOTE — PROGRESS NOTES
"Surgery    Patient seen and examined.     Patient requests to go home...soon.  Had a discussion about her baseline level of function.  She hasn't walked for 10 years. She spends most of the time in her air bed but is able to stand and transfer independently to her motorized mobile chair. She drives and has an adaptive van that accommodates rolling her chair up a ramp behind the passenger seat, then she is able to independently transfer to the drivers seat.     Encouraged her desire to get home however I told her she needs to work hard to show her care team she is ready and this will likely require extended facility physical rehabilitation. In short, she'll probably need to be strong enough to stand to demonstrate readiness for returning home.  She expressed understanding that she would not improve if she returns home too soon.    Agree with student note and plan below.    Juan Boyle PA-C        General Surgery--PA Student    Alert, oriented, and appropriately responsive requesting that she would like to go home as soon as possible, discussed TCU and patient reported she would like to go home with home care  Only complaint is still feeling shortness of breath but neb treatments help  Denied pain currently  Arms and legs feel less \"heavy\" today and improvement movement overall, able to move arms to touch face    /63 (BP Location: Left arm)   Pulse 84   Temp 97.8  F (36.6  C) (Axillary)   Resp 16   Wt (!) 155 kg (341 lb 11.4 oz)   SpO2 95%   BMI 57.75 kg/m      Exam:  Resp: Non-labored, shallow, diminshed breath sounds throughout bilateral  Heart: RRR, without M/R/G or rubs  Abdomen: Soft, non-distended, non-tender to palpation. Midline abdomen incision open to air, clean, dry. Right THAI drain removed. TP infusing at goal rate. All drains in place with clean dry dressings.  Extremities: Greatly increased arm movement, patient able to touch face. Bilateral  strength improved greatly 5/5 bilateral. No " LE edema or pain. Able to move feet and ankles bilaterally without issue.    Labs:  Slightly decreased WBC 15.1 today from yesterday 15.3  Slightly decreased HGB 9.5 today from yesterday 9.7    I/O 1/15/2020 0700 -- 1/16/2020 0700:  TP infusing at goal rate  G tube output mild increase 215mL to 255mL nothing in tube currently  THAI output same 5mL serous  Malecot output mild increase 50mL to 75mL nothing in tube currently    Assessment/Plan:  Perforated duodenal ulcer with hemorrhagic shock and bowel ischemia, s/p ext lap, TRUONG, Malecot drain placement with perforation, and wound VAC on 12/29/2019. Repeat ex-lap, TRUONG, closure of duodenal perforation over Malecot, G-tube/J-tube placements, delayed primary closure on 1/1/2020.                 - Continue clear liquids, tube feedings, and drains              - Continue monitor drains, WBC, HGB              - Continue PT/OT and patient strengthening of arms and legs              - Continue Neb treatments Q4 hours for shortness of breath              - TCU when available and cleared by hospitalist     Mike Blankenship, PA-S

## 2020-01-16 NOTE — PROGRESS NOTES
[facility]  Hospitalist Progress Note    Date of admission: 12/29/2019  Date of Service (when I saw the patient): 01/16/2020      Interval History   She denies any pain. No N/V. She is very hesiatant to take any oral intake. Had 5liters urine out put yesterday     Assessment & Plan   Bhavani White is a 64 year old female with hx of DM2, chronic cystitis s/p chronic SP catheter, chronic pain on methadone maintenance, asthma, ANIYA, and hypothyroidism who was admitted to the ICU service on 12/29/2019 for a perforated duodenal ulcer with associated septic shock and bowel ischemia. She is s/p ex-lap x2, but perforation could not be entirely repaired due to size; she is s/p THAI drains and GJ tube placement. Hospital course has otherwise been complicated by stress cardiomyopathy. She was weaned off pressors on 1/5 and successfully extubated on 1/6. She was transferred the the Hospitalist service on 1/6.     Acute metabolic encephalopathy,  Improved   Multifactorial due to hemorrhagic/septic shock, ICU delirium. She was started on scheduled Seroquel while in the ICU which was discontinued with improvement in her mental status   - Currently alert and oriented      Perforated duodenal ulcer with hemorrhagic shock and bowel ischemia, s/p ex lap, TRUONG, Malecot drain placement in perforation, and wound VAC on 12/29/2019, repeat ex-lap, TRUONG, closure of duodenal perforation over Malecot, G-tube/J-tube placements, and delayed primary closure on 1/1/2020  * Presented with 3 day history of progressive abdominal pain, nausea, and vomiting. En route, she was noted by EMS to vomit feculent material with associated aspiration, and was hypotensive on arrival. She was intubated in the ED, initiated on 4 pressor agents, and stress-dose steroids. CT chest/abd/pelvis on arrival showed RML infiltrate consistent with aspiration and evidence of perforated duodenum. She was initiated on IV pip-tazo, and taken emergent to the OR by  General Surgery   * 12/29-12/30: s/p ex lap, TRUONG, Malecot placement in perforation, and TAC. Gastric and duodenal ischemia and hemorrhage noted, no obvious leak. Blood was evacuated and drain placed.    * Returned to OR on 1/1 for repeat ex lap, TRUONG, closure of duodenal perf over Malecot, and GJ tube placement. Unable to completely repair perforation due to size. THAI drains placed for containment. Bowel ischemia improved on visualization.  * Weaned off pressors on 1/5  * Repeat CT abd/pelvis on 1/11 showed surgical drains in place with only a few tiny foci of remaining air and no abscess. R THAI drain removed on 1/12  - Post-operative management as per General Surgery: Left THAI drain in place.  - On tube feeds.on clear liquid diet for pleasure   Tube feeds to continue via J tube. G tube to vent   - Completed 14-day course of pip-tazo on 1/11  - Completed 7-day hydrocortisone taper (last day: 1/13)  - Continues on pantoprazole 40 mg PO BID per GI   - Holding PTA aspirin due to PUD/GI hemorrhage  GI consult recommended holding off on dual antiplatelets atleast for 2months if possible to let the ulcer heal   So cardioogy recommended out pt follow up in clinic     Acute hypoxic respiratory failure, multifactorial  * Due to aspiration pneumonia on admission and was found to have complete atelectasis of the right lung and small to moderate pleural effusion per CT on 1/11. Patient needs aggressive pulm toilet, but this is limited by encephalopathy and profound weakness.   * Completed 14-day course of IV pip-tazo on 1/11  * Trial of nebs and RT assistance with pulm toilet  - WBC remained elevated but patient afebrile with no new s/sx of infection. High risk for recurrent pneumonia. WBC slightly better today 1/13, suspect this is partly also due to steroid. Monitor for fever or clinical signs of infection. Hold off on further abx as clinically improving and just completed extensive course.   - On albuterol nebs q4h while awake,  mucomyst nebs QID, and percussion therapy per RT  - Encourage incentive spirometry, flutter valve, RCAT as able  - Noted several lbs of weight gain, weight is likely not accurate but it overall reflects markedly increased weight. CT abd pelvis 1/12 picked up at least moderate pleural effusion on Right. Lasix 20 mg once IV, if tolerates and good output, will place 20-40 mg IV daiy from am.   - Also will get US Rt chest to eval for effusion, will plan thoracentesis if adequate fluid.   - On 5-6 lpm O2 per NC, wean as tolerated  Started on lasix 20mg IV BID on 1/14  to help with gentle diuresis will change to 20mg daily via G tube on 1/15   Pt had 14liters of urine out put since starting diuresis   Potassium running low so scheduled 20meq KCL via g tube daily while on lasix        Septic shock with Staph hominis bacteremia, Improving to resolved  Aspiration event noted en route to the ED. AMS, hypotension, leukocytosis (20.5k), elevated LFT present on arrival. 1 out of 2 blood cultures on admission grew Staph hominis, and arterial line on 1/2 also grew Staph hominis. This was removed upon return of blood cultures. She initially required 4 pressor agents and stress dose steroids which were gradually weaned; has been off pressors since 1/5.   She has been on the following anti-infective regimen:  * 12/29-1/2: IV vancomycin  * 12/30-1/6: IV meropenem  * 12/30-1/2: IV fluconazole for fungal prophylaxis  * Completed 14 day course of IV pip-tazo on 1/11  - completed 7-day hydrocortisone taper (last day: 1/13)  - Repeat blood culture on 1/4: NGTD    Acute blood loss anemia   Baseline Hgb 16-19, decreased to 9-10 range from perforated ulcer and surgical losses.   - Hgb stable in 9-10 range, will follow  hgbstable at 9.5 today      Elevated troponin due to suspected demand ischemia, initial concern for NSTEMI  Chronic systolic CHF due to stress cardiomyopathy  Initial troponin on arrival was elevated to 19.4 and subsequently  trended down. EKG showed RBBB with septal Q waves. TTE (12/29) showed LVEF 30-40% with global hypokinesis predominantly involving the apex. Clinical presentation was felt by Cardiology to be consistent with stress cardiomyopathy. She was initially treated with IV heparin, but this was discontinued within 24 hours    - Clinically appears compensated, euvolemic  - Consider repeat TTE and ischemia work-up once more clinically stable    - Holding aspirin due to duodenal ulcer  - restart  statin  Today as LFTS improved   - Cardiology reconsulted on 1/14    Repeat echo showed EF normalized at 60%   [facility] consulted as per GI its good to let the ulcer heal  for 2 months before starting dual antiplatelet agents  So cardiology planning on outpatient follow-up with a stress MRI to look for any ischemia  Coronary angiogram not advised during this hospitalization  Cardiology signed off again   Started her on prvastatin 20mg Po daily   DM2 with peripheral neuropathy   * A1c 7.1. PTA on metformin 500 mg BID.   * Treated with insulin gtt 12/31-1/9  - BS stable and controlled with Lantus 20 units daily today. May need to further decrease dose as completed steroid today. Monitor for hypoglycemia.    - High SSI available     ROLANDO stage II, Improved  Creatinine up to 2.5 from baseline 0.7-0.8. Suspect pre-renal state due to shock. Renal function improved with aggressive fluid resuscitation and blood pressure management.  - Creatinine appears to have plateaued in 0.9-1 range  Started on lasix 20mg IV BID for gentle diuresis switched  to Lasix 20 mg via G-tube daily on 1/15/20  Cr stable at 0.7 today     Elevated transaminases due to septic shock  AST 1044, ALT 1363 on arrival. Improved with resolution of septic shock  - LFTs appear to have plateaued: AST 70s, ALT 90s. Will follow periodically  LFTS improved        Chronic pain syndrome  * PTA methadone 20/10/20 mg TID, gabapentin 1600 mg TID, tizanidine 4 mg TID  - Continues on PTA  methadone  - PTA gabapentin has been decreased to 800 mg TID in the setting of AMS and recent ROLANDO  - Holding PTA tizanidine  - Oxycodone, IV Dilaudid PRN      History of chronic cystitis s/p chronic suprapubic catheter  - SP catheter in place  - Holding PTA oxybutynin and pyridium.      Stage 2 pressure ulcer on left buttocks  Stage 1 pressure ulcers on sacrum and LLE  Present on admission  - Wound cares in place as per WOCN     Essential hypertension  * PTA: lisinopril 5 mg daily.    - Holding lisinopril in setting of recent shock and ROLANDO  - She has been started on amlodipine 5 mg daily, will follow blood pressure and titrate accordingly.- Labetalol PRN for SBP>175  Blood pressure better controlled with systolics in the 110s to 140s today  Hyperlipidemia  - Holding PTA simvastatin due to elevated LFTs        Hypothyroidism:  - Continues on PTA levothyroxine 88 mcg daily.         Mild intermittent asthma  PTA on albuterol, levalbuterol PRN  - Bronchodilators available PRN     History of ANIYA  Reportedly no longer on PAP therapy  - Currently on biPAP qhs        Morbid obesity  BMI 63    FEN: NPO, on tube feeds  Drains/Tubes: SP catheter (chronic), THAI drain x1, G and J tubes (1/1)  Lines: RUE PICC, 1/5  DVT Prophylaxis: Enoxaparin (Lovenox) SQ  Code Status: Full Code    Disposition: Expected discharge in 1-2days if remains stable : Pt refusing to go to TCU . At baseline she is mostly in wheel chair as per the pt her  would help her at home.   Sherrie Tse MD  Hospitalist      -Data reviewed today: I reviewed all new labs and imaging results over the last 24 hours. I personally reviewed no images or EKG's today.    Physical Exam   Temp: 97.7  F (36.5  C) Temp src: Oral BP: 111/55 Pulse: 84 Heart Rate: 70 Resp: 18 SpO2: 96 % O2 Device: None (Room air)    Vitals:    01/12/20 0636 01/14/20 0644 01/16/20 0500   Weight: (!) 170 kg (374 lb 12.5 oz) (!) 168.7 kg (371 lb 14.7 oz) (!) 155 kg (341 lb 11.4 oz)     Vital  Signs with Ranges  Temp:  [97.7  F (36.5  C)-99.3  F (37.4  C)] 97.7  F (36.5  C)  Pulse:  [83-84] 84  Heart Rate:  [70-84] 70  Resp:  [16-18] 18  BP: (110-121)/(55-63) 111/55  SpO2:  [93 %-100 %] 96 %  I/O last 3 completed shifts:  In: 3480 [P.O.:1160; NG/GT:400]  Out: 4645 [Urine:4300; Emesis/NG output:255; Drains:90]    Constitutional: alert awake, oriented, pleasant. Not in distress.   Respiratory: Diminished breath sounds bilaterally no rales or wheezing heard today.    cardiovascular: Heart sounds distant, RRR. No edema today    GI: +BS, abd soft, obese/distended. Dressing over incision in place. THAI drain in place serosanguinous fluid. G and J tubes in place.    : SP catheter in place draining clear, yellow urine  Skin/Integument: Abdominal incision dressing in place.  Musculoskeletal: edematous.   Neuro: Alert and appropriate. TORRES  Psych: Calm, cooperative with exam    Medications     dextrose       - MEDICATION INSTRUCTIONS -       - MEDICATION INSTRUCTIONS -       ACE/ARB/ARNI NOT PRESCRIBED       BETA BLOCKER NOT PRESCRIBED       STATIN NOT PRESCRIBED         albuterol  2.5 mg Nebulization Q4H While awake     amLODIPine  5 mg Oral or Feeding Tube Daily     enoxaparin ANTICOAGULANT  40 mg Subcutaneous Q24H     furosemide  20 mg Oral Daily     gabapentin  800 mg Oral or Feeding Tube TID     insulin aspart  1-12 Units Subcutaneous Q4H     insulin glargine  20 Units Subcutaneous QAM AC     levothyroxine  88 mcg Oral or Feeding Tube QAM AC     methadone  10 mg Oral or Feeding Tube Q24H     methadone  20 mg Oral or Feeding Tube Q12H KENIA     pantoprazole  40 mg Oral or Feeding Tube BID AC     potassium chloride  20 mEq Oral Daily     pravastatin  20 mg Oral Daily     sodium chloride (PF)  10 mL Intracatheter Q8H     Data   Recent Labs   Lab 01/16/20  0600 01/15/20  0645 01/14/20  0540 01/13/20  0545  01/10/20  0550   WBC 15.1* 15.3* 16.7* 16.7*   < > 20.4*   HGB 9.5* 9.7* 9.7* 9.1*   < > 9.6*   MCV 90 91 90 91    < > 92    206 222 194   < > 273    139 139 139   < > 147*   POTASSIUM 3.3* 3.4 3.4 3.7   < > 4.2   CHLORIDE 99 98 100 103   < > 112*   CO2 39* 40* 35* 32   < > 32   BUN 28 30 30 35*   < > 37*   CR 0.73 0.77 0.77 0.85   < > 1.05*   ANIONGAP 3 1* 4 4   < > 3   DORY 9.4 9.6 9.3 9.3   < > 8.5   * 169* 150* 127*   < > 139*   ALBUMIN  --   --   --  1.3*  --  1.4*   PROTTOTAL  --   --   --  4.6*  --  4.5*   BILITOTAL  --   --   --  0.4  --  0.6   ALKPHOS  --   --   --  82  --  97   ALT  --   --   --  36  --  75*   AST  --   --   --  28  --  43    < > = values in this interval not displayed.       No results found for this or any previous visit (from the past 24 hour(s)).

## 2020-01-16 NOTE — PROGRESS NOTES
Patient called for neb treatment. BS  Are clear and diminished in the bases bilaterally. SpO2 is 94% on room air. No neb given.

## 2020-01-16 NOTE — PLAN OF CARE
A&O x4. VSS on RA. Up with a lift and assist 2-3 people. LS diminished. BS faint, hypo. Flatus+. Urine output adequate via suprapubic catheter. Incision with staples, open to air. THAI to bulb suction, minimal output. Gastric tube to gravity. Malecot drain to gravity. Tube feeding at 60ml/hr continuous with scheduled flushes.   Tolerating clear liquids.  Skin folds cleansed, interdry and powder applied. c/o of burning on her R thigh, small blistered area near groin skin folds.  Frequent turn/repo.  Scheduled methodone and PRN oxy for pain.

## 2020-01-16 NOTE — PROGRESS NOTES
Patient given nebulizer treatments as ordered. BS are clear and diminished in the bases bilaterally. SpO2 on room air is 95%.

## 2020-01-16 NOTE — PLAN OF CARE
OT: Attempted OT, pt sleeping and pt having difficulty staying awake. Pt shook head yes to come back later. Nursing notified.

## 2020-01-16 NOTE — PROGRESS NOTES
CLINICAL NUTRITION SERVICES - REASSESSMENT NOTE      RECOMMENDATIONS FOR MD/PROVIDER TO ORDER: Recommend bowel regimen    Malnutrition: (1/12)  % Weight Loss:  None noted  % Intake:  No decreased intake noted (has been at goal TF past 5 days)  Subcutaneous Fat Loss:  None observed  Muscle Loss:  None observed  Fluid Retention:  None noted     Malnutrition Diagnosis: Patient does not meet two of the above criteria necessary for diagnosing malnutrition        EVALUATION OF PROGRESS TOWARD GOALS   Diet:  Clear Liquid + Breeze between meals     Nutrition Support:  Patient continues on goal TF regimen as follows ~    Nutrition Support Enteral:  Type of Feeding Tube: J-tube  Enteral Frequency:  Continuous  Enteral Regimen: Peptamen Intense VHP at 60 mL/hr  Total Enteral Provisions: 1440 kcal (12 kcal/kg), 132 g protein (2.5 g/kg), 1210 mL H2O, 7 g fiber, 109 g CHO, 96% RDI  Free Water Flush: 100 mL every 4 hours (Per MD)      Intake/Tolerance:    Took 1160 mL po yest --> noted diet will indefinitely be Clear Liquid due to size of perforation and drain placement   K 3.3 (L)  , 186, 151, 135, 140, 109 - High sliding scale insulin + Lantus 20 units in am    mL, drains 90 mL  I/O 3480/4645, weight up to 155 kg (up 33 kg from admit) - On Lasix   Last BM 1/11 - On Methadone which may be affecting     ASSESSED NUTRITION NEEDS:  Dosing Weight 122 kg (12/31, lowest wt - energy) and 54.5 kg (protein)  Estimated Energy Needs: 4203-2576 kcals (11-14 Kcal/Kg)  Justification: obese  Estimated Protein Needs: 110-135 grams protein (2-2.5 g pro/Kg)  Justification: hypercatabolism with acute illness and obesity guidelines      NEW FINDINGS:   1/14:  FEES (SLP shayne) --> Patient presents with mild pharyngeal dysphagia, recommend FL diet     WOCN following - latest note on 1/15 did not comment on stage of pressure injuries  Unable to send patient Certavite due to iodine allergy     Previous Goals (1/12):   TF goal Peptamen  Intense VHP at 60 mL/hr will meet % estimated  Evaluation: Met    Previous Nutrition Diagnosis (1/12):   No nutrition diagnosis identified at this time  Evaluation: Changed      CURRENT NUTRITION DIAGNOSIS  Altered GI function related to pain meds, inactivity as evidenced by no BM x 5 days     INTERVENTIONS  Recommendations / Nutrition Prescription  Continue Peptamen Intense VHP at 60 mL/hr as above   Recommend bowel regimen     Implementation  None     Goals  TF will continue to meet % needs  Patient will have a BM once every 24-48 hours     MONITORING AND EVALUATION:  Progress towards goals will be monitored and evaluated per protocol and Practice Guidelines    Julia Jang RD, LD, CNSC   Clinical Dietitian - [facility]

## 2020-01-17 ENCOUNTER — APPOINTMENT (OUTPATIENT)
Dept: SPEECH THERAPY | Facility: CLINIC | Age: 65
DRG: 853 | End: 2020-01-17
Payer: MEDICARE

## 2020-01-17 ENCOUNTER — APPOINTMENT (OUTPATIENT)
Dept: OCCUPATIONAL THERAPY | Facility: CLINIC | Age: 65
DRG: 853 | End: 2020-01-17
Attending: INTERNAL MEDICINE
Payer: MEDICARE

## 2020-01-17 ENCOUNTER — APPOINTMENT (OUTPATIENT)
Dept: PHYSICAL THERAPY | Facility: CLINIC | Age: 65
DRG: 853 | End: 2020-01-17
Payer: MEDICARE

## 2020-01-17 LAB
ANION GAP SERPL CALCULATED.3IONS-SCNC: 2 MMOL/L (ref 3–14)
BUN SERPL-MCNC: 26 MG/DL (ref 7–30)
CALCIUM SERPL-MCNC: 9.1 MG/DL (ref 8.5–10.1)
CHLORIDE SERPL-SCNC: 99 MMOL/L (ref 94–109)
CO2 SERPL-SCNC: 37 MMOL/L (ref 20–32)
CREAT SERPL-MCNC: 0.81 MG/DL (ref 0.52–1.04)
ERYTHROCYTE [DISTWIDTH] IN BLOOD BY AUTOMATED COUNT: 15.7 % (ref 10–15)
GFR SERPL CREATININE-BSD FRML MDRD: 78 ML/MIN/{1.73_M2}
GLUCOSE BLDC GLUCOMTR-MCNC: 109 MG/DL (ref 70–99)
GLUCOSE BLDC GLUCOMTR-MCNC: 110 MG/DL (ref 70–99)
GLUCOSE BLDC GLUCOMTR-MCNC: 118 MG/DL (ref 70–99)
GLUCOSE BLDC GLUCOMTR-MCNC: 165 MG/DL (ref 70–99)
GLUCOSE BLDC GLUCOMTR-MCNC: 171 MG/DL (ref 70–99)
GLUCOSE BLDC GLUCOMTR-MCNC: 173 MG/DL (ref 70–99)
GLUCOSE SERPL-MCNC: 147 MG/DL (ref 70–99)
HCT VFR BLD AUTO: 28.9 % (ref 35–47)
HGB BLD-MCNC: 9 G/DL (ref 11.7–15.7)
MCH RBC QN AUTO: 28.4 PG (ref 26.5–33)
MCHC RBC AUTO-ENTMCNC: 31.1 G/DL (ref 31.5–36.5)
MCV RBC AUTO: 91 FL (ref 78–100)
PLATELET # BLD AUTO: 224 10E9/L (ref 150–450)
POTASSIUM SERPL-SCNC: 3.7 MMOL/L (ref 3.4–5.3)
RBC # BLD AUTO: 3.17 10E12/L (ref 3.8–5.2)
SODIUM SERPL-SCNC: 138 MMOL/L (ref 133–144)
TRIGL SERPL-MCNC: 112 MG/DL
WBC # BLD AUTO: 11.4 10E9/L (ref 4–11)

## 2020-01-17 PROCEDURE — 40000275 ZZH STATISTIC RCP TIME EA 10 MIN

## 2020-01-17 PROCEDURE — 25000132 ZZH RX MED GY IP 250 OP 250 PS 637: Mod: GY | Performed by: INTERNAL MEDICINE

## 2020-01-17 PROCEDURE — 99232 SBSQ HOSP IP/OBS MODERATE 35: CPT | Performed by: HOSPITALIST

## 2020-01-17 PROCEDURE — 25000131 ZZH RX MED GY IP 250 OP 636 PS 637: Mod: GY | Performed by: INTERNAL MEDICINE

## 2020-01-17 PROCEDURE — 94640 AIRWAY INHALATION TREATMENT: CPT

## 2020-01-17 PROCEDURE — 85027 COMPLETE CBC AUTOMATED: CPT | Performed by: INTERNAL MEDICINE

## 2020-01-17 PROCEDURE — 84478 ASSAY OF TRIGLYCERIDES: CPT | Performed by: INTERNAL MEDICINE

## 2020-01-17 PROCEDURE — 00000146 ZZHCL STATISTIC GLUCOSE BY METER IP

## 2020-01-17 PROCEDURE — 97530 THERAPEUTIC ACTIVITIES: CPT | Mod: GP | Performed by: PHYSICAL THERAPIST

## 2020-01-17 PROCEDURE — 27210437 ZZH NUTRITION PRODUCT SEMIELEM INTERMED LITER

## 2020-01-17 PROCEDURE — 97167 OT EVAL HIGH COMPLEX 60 MIN: CPT | Mod: GO | Performed by: OCCUPATIONAL THERAPIST

## 2020-01-17 PROCEDURE — 94640 AIRWAY INHALATION TREATMENT: CPT | Mod: 76

## 2020-01-17 PROCEDURE — 92526 ORAL FUNCTION THERAPY: CPT | Mod: GN

## 2020-01-17 PROCEDURE — 25000125 ZZHC RX 250: Performed by: INTERNAL MEDICINE

## 2020-01-17 PROCEDURE — 80048 BASIC METABOLIC PNL TOTAL CA: CPT | Performed by: INTERNAL MEDICINE

## 2020-01-17 PROCEDURE — 97110 THERAPEUTIC EXERCISES: CPT | Mod: GP | Performed by: PHYSICAL THERAPIST

## 2020-01-17 PROCEDURE — 97530 THERAPEUTIC ACTIVITIES: CPT | Mod: GO | Performed by: OCCUPATIONAL THERAPIST

## 2020-01-17 PROCEDURE — 12000000 ZZH R&B MED SURG/OB

## 2020-01-17 PROCEDURE — 97535 SELF CARE MNGMENT TRAINING: CPT | Mod: GO | Performed by: OCCUPATIONAL THERAPIST

## 2020-01-17 PROCEDURE — 40000239 ZZH STATISTIC VAT ROUNDS

## 2020-01-17 PROCEDURE — 25000128 H RX IP 250 OP 636: Performed by: INTERNAL MEDICINE

## 2020-01-17 RX ADMIN — ENOXAPARIN SODIUM 40 MG: 40 INJECTION SUBCUTANEOUS at 16:16

## 2020-01-17 RX ADMIN — ALBUTEROL SULFATE 2.5 MG: 2.5 SOLUTION RESPIRATORY (INHALATION) at 07:17

## 2020-01-17 RX ADMIN — ALBUTEROL SULFATE 2.5 MG: 2.5 SOLUTION RESPIRATORY (INHALATION) at 10:43

## 2020-01-17 RX ADMIN — FUROSEMIDE 20 MG: 20 TABLET ORAL at 08:45

## 2020-01-17 RX ADMIN — AMLODIPINE BESYLATE 5 MG: 5 TABLET ORAL at 08:45

## 2020-01-17 RX ADMIN — OXYCODONE HYDROCHLORIDE 5 MG: 5 TABLET ORAL at 12:33

## 2020-01-17 RX ADMIN — GABAPENTIN 800 MG: 250 SOLUTION ORAL at 16:15

## 2020-01-17 RX ADMIN — LEVOTHYROXINE SODIUM 88 MCG: 88 TABLET ORAL at 06:58

## 2020-01-17 RX ADMIN — METHADONE HYDROCHLORIDE 20 MG: 10 TABLET ORAL at 08:45

## 2020-01-17 RX ADMIN — INSULIN ASPART 2 UNITS: 100 INJECTION, SOLUTION INTRAVENOUS; SUBCUTANEOUS at 12:06

## 2020-01-17 RX ADMIN — METHADONE HYDROCHLORIDE 20 MG: 10 TABLET ORAL at 21:37

## 2020-01-17 RX ADMIN — GABAPENTIN 800 MG: 250 SOLUTION ORAL at 08:45

## 2020-01-17 RX ADMIN — Medication 40 MG: at 08:45

## 2020-01-17 RX ADMIN — INSULIN ASPART 2 UNITS: 100 INJECTION, SOLUTION INTRAVENOUS; SUBCUTANEOUS at 21:38

## 2020-01-17 RX ADMIN — POTASSIUM CHLORIDE 20 MEQ: 1.5 POWDER, FOR SOLUTION ORAL at 08:45

## 2020-01-17 RX ADMIN — ALBUTEROL SULFATE 2.5 MG: 2.5 SOLUTION RESPIRATORY (INHALATION) at 00:04

## 2020-01-17 RX ADMIN — Medication 40 MG: at 16:15

## 2020-01-17 RX ADMIN — OXYCODONE HYDROCHLORIDE 5 MG: 5 TABLET ORAL at 21:36

## 2020-01-17 RX ADMIN — METHADONE HYDROCHLORIDE 10 MG: 10 TABLET ORAL at 14:48

## 2020-01-17 RX ADMIN — LEVALBUTEROL HYDROCHLORIDE 1.25 MG: 1.25 SOLUTION, CONCENTRATE RESPIRATORY (INHALATION) at 13:44

## 2020-01-17 RX ADMIN — ALBUTEROL SULFATE 2.5 MG: 2.5 SOLUTION RESPIRATORY (INHALATION) at 19:34

## 2020-01-17 RX ADMIN — ALBUTEROL SULFATE 2.5 MG: 2.5 SOLUTION RESPIRATORY (INHALATION) at 16:15

## 2020-01-17 RX ADMIN — GABAPENTIN 800 MG: 250 SOLUTION ORAL at 21:37

## 2020-01-17 RX ADMIN — ALBUTEROL SULFATE 2.5 MG: 2.5 SOLUTION RESPIRATORY (INHALATION) at 23:12

## 2020-01-17 RX ADMIN — PRAVASTATIN SODIUM 20 MG: 20 TABLET ORAL at 08:45

## 2020-01-17 RX ADMIN — INSULIN GLARGINE 20 UNITS: 100 INJECTION, SOLUTION SUBCUTANEOUS at 08:45

## 2020-01-17 ASSESSMENT — ACTIVITIES OF DAILY LIVING (ADL)
PREVIOUS_RESPONSIBILITIES: MEDICATION MANAGEMENT
ADLS_ACUITY_SCORE: 27
ADLS_ACUITY_SCORE: 24

## 2020-01-17 NOTE — PROGRESS NOTES
"Surgery    Patient seen and examined.   Continued to encourage her that TCU would provide the best care for acute recovery.  Agree with student note below.    Juan Boyle PA-C    General Surgery--PA Student    Alert, oriented, appropriately responsive and continues to request going home ASAP--patient reports that she feels she is back to her baseline, has help from her  and close family members, and her house is adapted for her needs. 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 We discussed the requirements of returning home and the risks of returning home too early.  Slept through the night without BiPAP but was on 1L O2 with nasal canula since 4AM per nursing due to O2 sats in the upper 80s--currently on room air and only feeling mild shortness of breath.   Denied current pain.  Moved arms and legs independently without prompting.    /53 (BP Location: Left arm)   Pulse 79   Temp 98.2  F (36.8  C) (Oral)   Resp 18   Wt (!) 155 kg (341 lb 11.4 oz)   SpO2 92%   BMI 57.75 kg/m      Exam:  Resp: non-labored, mildly shallow, dismissed breath sounds lower lobes bilateral--improved from yesterday  Heart: RRR, without M/R/G or rubs  Abdomen: soft, non-distended, non-tender with palpation. Midline abdomen incision open to air, clean, dry. Right THAI drain removed. TP infusing at goal rate. All drains in place with clean dry dressings.  Extremities: Able to touch face and move legs bilaterally without prompting. Bilateral  strength 5/5. No LE edema.    Labs:  Decreased WBC 11.4 today from 15.1 yesterday  Mild decreased HGB 9.0 today from 9.5 yesterday    I/O 1/16/2020 0700 -- 1/17/2020 0700:  TP infusing at goal rate  G tube out increased from 255mL to 375mL, nothing in tube currently  THAI out increased from 5mL to 25mL, nothing in tube currently  Malecot out decreased from 85mL to 20mL, bilious    Assessment/Plan:  Perforated duodenal ulcer with hemorrhagic shock " and bowel ischemia, s/p ext lap, TRUONG, Malecot drain placement with perforation, and wound VAC on 12/29/2019. Repeat ex-lap, TRUONG, closure of duodenal perforation over Malecot, G-tube/J-tube placements, delayed primary closure on 1/1/2020.                 - Continue clear liquids, tube feedings, and drains              - Continue monitor drains, WBC, HGB              - Continue PT/OT and patient strengthening of arms and legs to help determine if patient is at baseline and safe to return home vs. TCU              - Continue Neb treatments Q4 hours for shortness of breath              - Discharge to home vs. TCU when available and cleared by hospitalist     ALIZA DubonS

## 2020-01-17 NOTE — PROGRESS NOTES
01/17/20 1128   Quick Adds   Type of Visit Initial Occupational Therapy Evaluation   Living Environment   Lives With spouse   Living Arrangements apartment   Home Accessibility no concerns   Transportation Anticipated car, drives self;family or friend will provide  ( doesn't drive. )   Self-Care   Equipment Currently Used at Home grab bar, tub/shower;raised toilet;grab bar, toilet  (power w/c and air bed, dressing equipment,etc)   Activity/Exercise/Self-Care Comment transfer tub bench   Functional Level   Ambulation 4-->completely dependent  (power w/.c)   Transferring 1-->assistive equipment   Toileting 1-->assistive equipment   Bathing 3-->assistive equipment and person   Dressing 1-->assistive equipment   Eating 0-->independent   Communication 0-->understands/communicates without difficulty   Prior Functional Level Comment pt manages own meds, pt's  does the cooking and cleaning and has A with cleaning. pt drives   General Information   Onset of Illness/Injury or Date of Surgery - Date 12/29/19   Referring Physician Sherrie Tse MD   Patient/Family Goals Statement home   Additional Occupational Profile Info/Pertinent History of Current Problem Bhavani White is a 64 year old female with hx of DM2, chronic cystitis s/p chronic SP catheter, chronic pain on methadone maintenance, asthma, ANIYA, and hypothyroidism who was admitted to the ICU service on 12/29/2019 for a perforated duodenal ulcer with associated septic shock and bowel ischemia. She is s/p ex-lap x2, but perforation could not be entirely repaired due to size; she is s/p THAI drains and GJ tube placement. Hospital course has otherwise been complicated by stress cardiomyopathy. She was weaned off pressors on 1/5 and successfully extubated on 1/6.    Precautions/Limitations fall precautions;oxygen therapy device and L/min   Cognitive Status Examination   Orientation orientation to person, place and time   Level of Consciousness alert    Follows Commands (Cognition) WFL   Sensory Examination   Sensory Comments B hand and feet neuropathy with numbness and pain   Pain Assessment   Patient Currently in Pain Yes, see Vital Sign flowsheet  (R hip pain 8/10 with rolling)   Range of Motion (ROM)   ROM Comment B UE shoulder AROM to approx 60 degrees flexion, B UE distal AROM WFL   Strength   Strength Comments B shoudlers 2+/5, 4-/5 elbows   Bed Mobility Skill: Rolling/Turning   Level of [place] - Bed Mobility Skill Rolling Turning maximum assist (25% patients effort)   Physical Assist/Nonphysical Assist 2 persons   Transfer Skills   Transfer Comments Pt requires use of ceiling lift and A of 2 for transfer to chair   Upper Body Dressing   Level of [place]: Dress Upper Body maximum assist (25% patients effort)   Lower Body Dressing   Level of [place]: Dress Lower Body unable to perform   Grooming   Level of [place]: Grooming moderate assist (50% patients effort)   Instrumental Activities of Daily Living (IADL)   Previous Responsibilities medication management   Activities of Daily Living Analysis   Impairments Contributing to Impaired Activities of Daily Living balance impaired;cognition impaired;strength decreased   General Therapy Interventions   Planned Therapy Interventions ADL retraining;cognition;transfer training;home program guidelines;progressive activity/exercise   Clinical Impression   Criteria for Skilled Therapeutic Interventions Met yes, treatment indicated   OT Diagnosis decreased ADL/IADL's and functional mobiliy    Influenced by the following impairments impaired balance, strength, activity tolerance   Assessment of Occupational Performance 5 or more Performance Deficits   Identified Performance Deficits impaired I with dressing, functional transfers, toilet and tub transfer, med mgmt, etc   Clinical Decision Making (Complexity) High complexity   Therapy Frequency 3x/week   Predicted Duration of Therapy Intervention  "(days/wks) 5 days   Anticipated Discharge Disposition Transitional Care Facility   Risks and Benefits of Treatment have been explained. Yes   Patient, Family & other staff in agreement with plan of care Yes   [facility] AM-PAC  \"6 Clicks\" Daily Activity Inpatient Short Form   1. Putting on and taking off regular lower body clothing? 1 - Total   2. Bathing (including washing, rinsing, drying)? 1 - Total   3. Toileting, which includes using toilet, bedpan or urinal? 1 - Total   4. Putting on and taking off regular upper body clothing? 2 - A Lot   5. Taking care of personal grooming such as brushing teeth? 2 - A Lot   6. Eating meals? 3 - A Little   Daily Activity Raw Score (Score out of 24.Lower scores equate to lower levels of function) 10   Total Evaluation Time   Total Evaluation Time (Minutes) 10     "

## 2020-01-17 NOTE — PROGRESS NOTES
[facility]    Referral received for [facility] to provide J tube feeding; enteral therapy.  I met with patient at bedside today.  Introduced myself and my role to assist with a safe  transition to home.   Offered some preliminary information about [facility] services and offered choice of providers.  Patient indicated she would like to have [facility] and declined to use other provider for her TF. Let patient know I am available M-F and would continue to follow and be available for any questions.   Contact  information left with pt on how to reach me if they have any additional questions.    [facility] stated she [facility] prior to hospitalization.      Thank you for the home infusion referral.    GIOVANA Shi, DOMINICK  [facility]  [phone]

## 2020-01-17 NOTE — PROGRESS NOTES
"CHANELLE  I: CHANELLE received a call from patient's spouse sergio reporting that he has not been updated on any information. CHANELLE reminds Sergio that SW updated him yesterday and that we are waiting until patient is medically stable to have her discharged home. Sergio does not remember our conversation. When discussing patient's medical condition, Sergio asks why he does not have a say in whether or not patient is medically stable for discharge. CHANELLE explains that the MD will determine this. Sergio states \"She is a prisoner there.\" CHANELLE informs Sergio that \"patient is able to leave; however, patient's medical care team is absolutely not recommending that at this time.\" Sergio was ok with that answer and is agreeable to keeping patient in the hospital. Jasvir reports that he met with a [facility] worker today but did not give any more details about that interaction.     CHANELLE spoke with Jasvir from [facility] [place] ([phone]) who shares that he met with Sergio today at their apartment. Jasvir reports that patient appeared well kempt, alert and oriented, answered all questions appropriately, had medications organized, house clean. Jasvir did not have any major concerns while interviewing Sergio. Jasvir is aware of patient's condition and once a [facility] report is completed on patient at discharge, Jasvir will be the worker assigned to her case as well. CHANELLE will need to file report once discharge plan is arranged.    P: CHANELLE will continue to follow and assist as needed.    SARAH Hernandez, LGSW  [phone]  [facility]    "

## 2020-01-17 NOTE — PLAN OF CARE
Dx:perf ulcer repair and explore lap Hx:DM, MRSA, Sleep apnea, chronic pain Vs:stable on 1L NC. A/O:x4.. Blood Sugars:WDL. Labs:WDL. Incisions:Dressings changed CDI. Midline RUBÉN. CWMS:WDL. Pain level/controlled with:PRN oxy. Up with:Lift Ax2. Tolerating clear liquid diet. No N/V. Passing gas. Suprapublic catheter in place. GI:Ax4. Resp:diminished lung sounds. Plan: Will continue monitor. Waiting to determine discharge plans. TCU vs home health

## 2020-01-17 NOTE — PLAN OF CARE
A&Ox4. VSS, refused BiPAP overnight. Up with lift. Tolerating clear liquid diet. Tube feeding at 60 ml/hr, tolerating. Lung sounds diminished, dyspnic on exertion. Bowel sounds hypoactive, + flatus. Adequate urine output via suprapubic catheter. Drain dressings CDI. Midline incision RUBÉN. Mepilex on coccyx . Pain controlled with scheduled Methadone.

## 2020-01-17 NOTE — PROGRESS NOTES
[facility]  Hospitalist Progress Note    Date of admission: 12/29/2019  Date of Service (when I saw the patient): 01/17/2020      Interval History   She denies any pain. No N/V. She is very hesiatant to take any oral intake. Had 5liters urine out put yesterday     Assessment & Plan   Bhavani White is a 64 year old female with hx of DM2, chronic cystitis s/p chronic SP catheter, chronic pain on methadone maintenance, asthma, ANIYA, and hypothyroidism who was admitted to the ICU service on 12/29/2019 for a perforated duodenal ulcer with associated septic shock and bowel ischemia. She is s/p ex-lap x2, but perforation could not be entirely repaired due to size; she is s/p THAI drains and GJ tube placement. Hospital course has otherwise been complicated by stress cardiomyopathy. She was weaned off pressors on 1/5 and successfully extubated on 1/6. She was transferred the the Hospitalist service on 1/6.     Acute metabolic encephalopathy,  Improved   Multifactorial due to hemorrhagic/septic shock, ICU delirium. She was started on scheduled Seroquel while in the ICU which was discontinued with improvement in her mental status   - Currently alert and oriented, a little somnolent      Perforated duodenal ulcer with hemorrhagic shock and bowel ischemia, s/p ex lap, TRUONG, Malecot drain placement in perforation, and wound VAC on 12/29/2019, repeat ex-lap, TRUONG, closure of duodenal perforation over Malecot, G-tube/J-tube placements, and delayed primary closure on 1/1/2020  * Presented with 3 day history of progressive abdominal pain, nausea, and vomiting. En route, she was noted by EMS to vomit feculent material with associated aspiration, and was hypotensive on arrival. She was intubated in the ED, initiated on 4 pressor agents, and stress-dose steroids. CT chest/abd/pelvis on arrival showed RML infiltrate consistent with aspiration and evidence of perforated duodenum. She was initiated on IV pip-tazo, and taken  emergent to the OR by General Surgery   * 12/29-12/30: s/p ex lap, TRUONG, Malecot placement in perforation, and TAC. Gastric and duodenal ischemia and hemorrhage noted, no obvious leak. Blood was evacuated and drain placed.    * Returned to OR on 1/1 for repeat ex lap, TRUONG, closure of duodenal perf over Malecot, and GJ tube placement. Unable to completely repair perforation due to size. THAI drains placed for containment. Bowel ischemia improved on visualization.  * Weaned off pressors on 1/5  * Repeat CT abd/pelvis on 1/11 showed surgical drains in place with only a few tiny foci of remaining air and no abscess. R THAI drain removed on 1/12  plan  - Post-operative management as per General Surgery: Left THAI drain in place.  - On tube feeds.on clear liquid diet for pleasure   Tube feeds to continue via J tube. G tube to vent   - Completed 14-day course of pip-tazo on 1/11  - Completed 7-day hydrocortisone taper (last day: 1/13)  - Continues on pantoprazole 40 mg PO BID per GI   - Holding PTA aspirin due to PUD/GI hemorrhage  GI consult recommended holding off on dual antiplatelets atleast for 2months if possible to let the ulcer heal   So cardioogy recommended out pt follow up in clinic     Acute hypoxic respiratory failure, multifactorial  * Due to aspiration pneumonia on admission and was found to have complete atelectasis of the right lung and small to moderate pleural effusion per CT on 1/11. Patient needs aggressive pulm toilet, but this is limited by encephalopathy and profound weakness.   * Completed 14-day course of IV pip-tazo on 1/11  * Trial of nebs and RT assistance with pulm toilet  plan  - On albuterol nebs q4h while awake, mucomyst nebs QID, and percussion therapy per RT  - Encourage incentive spirometry, flutter valve, RCAT as able  - continue 2 liters of O2      Septic shock with Staph hominis bacteremia, Improving to resolved  Aspiration event noted en route to the ED. AMS, hypotension, leukocytosis  (20.5k), elevated LFT present on arrival. 1 out of 2 blood cultures on admission grew Staph hominis, and arterial line on 1/2 also grew Staph hominis. This was removed upon return of blood cultures. She initially required 4 pressor agents and stress dose steroids which were gradually weaned; has been off pressors since 1/5.   She has been on the following anti-infective regimen:  * 12/29-1/2: IV vancomycin  * 12/30-1/6: IV meropenem  * 12/30-1/2: IV fluconazole for fungal prophylaxis  * Completed 14 day course of IV pip-tazo on 1/11  - completed 7-day hydrocortisone taper (last day: 1/13)  - Repeat blood culture on 1/4: NGTD    Acute blood loss anemia   Baseline Hgb 16-19, decreased to 9-10 range from perforated ulcer and surgical losses.   - Hgb stable in 9-10 range, will follow  hgbstable at 9.5 today      Elevated troponin due to suspected demand ischemia, initial concern for NSTEMI  Chronic systolic CHF due to stress cardiomyopathy  Initial troponin on arrival was elevated to 19.4 and subsequently trended down. EKG showed RBBB with septal Q waves. TTE (12/29) showed LVEF 30-40% with global hypokinesis predominantly involving the apex. Clinical presentation was felt by Cardiology to be consistent with stress cardiomyopathy. She was initially treated with IV heparin, but this was discontinued within 24 hours    - Clinically appears compensated, euvolemic  - Consider repeat TTE and ischemia work-up once more clinically stable    - Holding aspirin due to duodenal ulcer  - restart  statin  Today as LFTS improved   - Cardiology reconsulted on 1/14    Repeat echo showed EF normalized at 60%   [facility] consulted as per GI its good to let the ulcer heal  for 2 months before starting dual antiplatelet agents  So cardiology planning on outpatient follow-up with a stress MRI to look for any ischemia  Coronary angiogram not advised during this hospitalization  Cardiology signed off again   Started her on prvastatin 20mg Po  daily   DM2 with peripheral neuropathy   * A1c 7.1. PTA on metformin 500 mg BID.   * Treated with insulin gtt 12/31-1/9  - BS stable and controlled with Lantus 20 units daily today. May need to further decrease dose as completed steroid today. Monitor for hypoglycemia.    - High SSI available     ROLANDO stage II, Improved  Creatinine up to 2.5 from baseline 0.7-0.8. Suspect pre-renal state due to shock. Renal function improved with aggressive fluid resuscitation and blood pressure management.  - Creatinine appears to have plateaued in 0.9-1 range  Started on lasix 20mg IV BID for gentle diuresis switched  to Lasix 20 mg via G-tube daily on 1/15/20  Cr stable at 0.7 today     Elevated transaminases due to septic shock  AST 1044, ALT 1363 on arrival. Improved with resolution of septic shock  - LFTs appear to have plateaued: AST 70s, ALT 90s. Will follow periodically  LFTS improved        Chronic pain syndrome  * PTA methadone 20/10/20 mg TID, gabapentin 1600 mg TID, tizanidine 4 mg TID  - Continues on PTA methadone  - PTA gabapentin has been decreased to 800 mg TID in the setting of AMS and recent ROLANDO  - Holding PTA tizanidine  - Oxycodone, IV Dilaudid PRN      History of chronic cystitis s/p chronic suprapubic catheter  - SP catheter in place  - Holding PTA oxybutynin and pyridium.      Stage 2 pressure ulcer on left buttocks  Stage 1 pressure ulcers on sacrum and LLE  Present on admission  - Wound cares in place as per WOCN     Essential hypertension  * PTA: lisinopril 5 mg daily.    - Holding lisinopril in setting of recent shock and ROLANDO  - She has been started on amlodipine 5 mg daily, will follow blood pressure and titrate accordingly.- Labetalol PRN for SBP>175  Blood pressure better controlled with systolics in the 110s to 140s today  Hyperlipidemia  - Holding PTA simvastatin due to elevated LFTs        Hypothyroidism:  - Continues on PTA levothyroxine 88 mcg daily.         Mild intermittent asthma  PTA on  albuterol, levalbuterol PRN  - Bronchodilators available PRN     History of ANIYA  Reportedly no longer on PAP therapy  - Currently on biPAP qhs        Morbid obesity  BMI 63    FEN: CLD, on tube feeds  Drains/Tubes: SP catheter (chronic), THAI drain x1, G and J tubes (1/1)  Lines: RUE PICC, 1/5  DVT Prophylaxis: Enoxaparin (Lovenox) SQ  Code Status: Full Code    Disposition: Expected discharge in 1-2days if remains stable : Pt refusing to go to TCU . At baseline she is mostly in wheel chair as per the pt her  would help her at home.   At home she does have the ability to stand to transfer, which she has not demonstrated yet in the hospital.   Vulnerable adult inquest will be done if she discharges home.    Needs at home will be: continuous tube feeds, multiple lines including jejunostomy tube, venting g tube. PT/OT      Follow up will be:  General surgery, cardiology,         -Data reviewed today: I reviewed all new labs and imaging results over the last 24 hours. I personally reviewed no images or EKG's today.    Physical Exam   Temp: 98.8  F (37.1  C) Temp src: Oral BP: 120/77 Pulse: 80 Heart Rate: 88 Resp: 18 SpO2: 94 % O2 Device: None (Room air) Oxygen Delivery: 2 LPM  Vitals:    01/12/20 0636 01/14/20 0644 01/16/20 0500   Weight: (!) 170 kg (374 lb 12.5 oz) (!) 168.7 kg (371 lb 14.7 oz) (!) 155 kg (341 lb 11.4 oz)     Vital Signs with Ranges  Temp:  [97.8  F (36.6  C)-98.8  F (37.1  C)] 98.8  F (37.1  C)  Pulse:  [77-80] 80  Heart Rate:  [78-88] 88  Resp:  [18-20] 18  BP: (100-120)/(53-77) 120/77  SpO2:  [89 %-95 %] 94 %  I/O last 3 completed shifts:  In: 300 [P.O.:300]  Out: 3495 [Urine:3075; Emesis/NG output:375; Drains:45]    Constitutional: alert awake, oriented, pleasant. Not in distress.   Respiratory: Diminished breath sounds bilaterally no rales or wheezing heard today.    cardiovascular: Heart sounds distant, RRR. No edema today    GI: +BS, abd soft, obese/distended. Dressing over incision in  place. THAI drain in place serosanguinous fluid. G and J tubes in place.    : SP catheter in place draining clear, yellow urine  Skin/Integument: Abdominal incision dressing in place.  Musculoskeletal: edematous.   Neuro: Alert and appropriate. TORRES  Psych: Calm, cooperative with exam    Medications     dextrose       - MEDICATION INSTRUCTIONS -       - MEDICATION INSTRUCTIONS -       ACE/ARB/ARNI NOT PRESCRIBED       BETA BLOCKER NOT PRESCRIBED       STATIN NOT PRESCRIBED         albuterol  2.5 mg Nebulization Q4H While awake     amLODIPine  5 mg Oral or Feeding Tube Daily     enoxaparin ANTICOAGULANT  40 mg Subcutaneous Q24H     furosemide  20 mg Oral Daily     gabapentin  800 mg Oral or Feeding Tube TID     insulin aspart  1-12 Units Subcutaneous Q4H     insulin glargine  20 Units Subcutaneous QAM AC     levothyroxine  88 mcg Oral or Feeding Tube QAM AC     methadone  10 mg Oral or Feeding Tube Q24H     methadone  20 mg Oral or Feeding Tube Q12H KENIA     pantoprazole  40 mg Oral or Feeding Tube BID AC     potassium chloride  20 mEq Oral Daily     pravastatin  20 mg Oral Daily     sodium chloride (PF)  10 mL Intracatheter Q8H     Data   Recent Labs   Lab 01/17/20  0620 01/16/20  1940 01/16/20  0600 01/15/20  0645  01/13/20  0545   WBC 11.4*  --  15.1* 15.3*   < > 16.7*   HGB 9.0*  --  9.5* 9.7*   < > 9.1*   MCV 91  --  90 91   < > 91     --  229 206   < > 194     --  141 139   < > 139   POTASSIUM 3.7 3.8 3.3* 3.4   < > 3.7   CHLORIDE 99  --  99 98   < > 103   CO2 37*  --  39* 40*   < > 32   BUN 26  --  28 30   < > 35*   CR 0.81  --  0.73 0.77   < > 0.85   ANIONGAP 2*  --  3 1*   < > 4   DORY 9.1  --  9.4 9.6   < > 9.3   *  --  140* 169*   < > 127*   ALBUMIN  --   --   --   --   --  1.3*   PROTTOTAL  --   --   --   --   --  4.6*   BILITOTAL  --   --   --   --   --  0.4   ALKPHOS  --   --   --   --   --  82   ALT  --   --   --   --   --  36   AST  --   --   --   --   --  28    < > = values in this  interval not displayed.       No results found for this or any previous visit (from the past 24 hour(s)).

## 2020-01-17 NOTE — PLAN OF CARE
Discharge Planner PT   Patient plan for discharge: Home  Current status: Attempted to transfer to sitting times 3. Patient able to get custodial to sitting position but then c/o right hip pain so unable to complete the transition into a sitting position. Good participation with bilateral LE ex in supine.   Barriers to return to prior living situation: Currently needs assist of a lift for mobility. Unable to get to sitting position.   Recommendations for discharge: TCU  Rationale for recommendations:  Patient is currently well below baseline and would benefit from continued skilled PT/OT in TCU setting to progress her independence with all mobility prior to discharging to home. If patient refuses TCU and transfers home will need 24 hr A and A with all ADl/IADl's and functional transfers with lulu lift, A of 2 for transfers and bed mobility, home RN, OT, pT, etc. Pt unsafe to return home at this time.       Entered by: Joy Cervantes 01/17/2020 3:34 PM

## 2020-01-17 NOTE — PLAN OF CARE
Discharge Planner OT   Patient plan for discharge: home  Current status: Eval completed and treatment initiated. Pt lives in an apt with her  and reports she uses a power w/c for mobility, transfers independently to bed, w/c, toilet etc and dressing. Pt manages her own meds and her  does the cooking, laundry and driving. Pt admitted due to perforated ulcer with septic shock, exploratory lapx2, completed by stress CM. Was intubated and now extubated.  Pt requires max A x 2 to roll side to side in bed with pt reporting B Hip pain and use of ceiling lift to transfer to recliner chair. Pt requires max A for UE grooming tasks to comb hair with hand over hand for full shoulder flexion and able to wash face with SBA/min A. Pt alert and oriented x4, however, appears to be unrealistic regarding discharge and wants to go home. Pt educated in the need of a lulu lift and A of 2 for all transfers and bed mobility and ADL's and that it's a lot of work and stress for her . Pt still wants to return home.   Barriers to return to prior living situation: impaired strength, B UE and LE AROM, decreased balance, activity tolerance, pain  Recommendations for discharge: TCU  Rationale for recommendations: pt requires increased A with all ADl/IADL's and functional transfers and Pt will require daily therapy to increase ADL and functional mobility independence and safety to PLOF. If home will need 24 hr A and A with all ADl/IADl's and functional transfers with lulu lift, A of 2 for transfers and bed mobility, home RN, OT, pT, etc. Pt unsafe to return home at this time.       Entered by: Patricia Villa 01/17/2020 12:53 PM

## 2020-01-17 NOTE — PROGRESS NOTES
Surgery    No changes.  Tolerating tube feeds and having bowel movements.  No pain today.  Abdomen-soft without tenderness.    Drains and tubes in appropriate position with appropriate output.    A/P  Continued improvement.  Patient refusing to go to TCU.  I had a very long discussion with her about the risk of going home.  I feel she will need more care than what can be provided at her house and that if the tubes become dislodged that it could have a very high morbidity or mortality due to her ongoing medical issues.  I strongly suggest that she does go to TCU for at least some time to assure her stability but she adamantly refused.  She will need to have follow-up with imaging in approximately 2 to 4 weeks.  Her staples can be removed prior to discharge.    Ayan Lopez M.D.  [facility]  [phone]

## 2020-01-17 NOTE — PLAN OF CARE
Discharge Planner SLP   Patient plan for discharge: Did not discuss  Current status: Pt tolerated thin liquids without overt s/sx of aspiration, no changes in breath sounds or vocal quality. Continue clears per GI.     Continue clear liquid (thin liquids) with 1:1 feeding assistance, however, defer to GI surgery team for diet advancement.  Recommend patient eat only when alert, up to chair whenever possible, small bites/sips.     Barriers to return to prior living situation: None per SLP  Recommendations for discharge: TCU  Rationale for recommendations: SLP to follow given swallow function is below baseline          Entered by: Joleen Deleon 01/17/2020 2:40 PM

## 2020-01-18 ENCOUNTER — APPOINTMENT (OUTPATIENT)
Dept: GENERAL RADIOLOGY | Facility: CLINIC | Age: 65
DRG: 853 | End: 2020-01-18
Attending: NURSE PRACTITIONER
Payer: MEDICARE

## 2020-01-18 PROBLEM — K26.5 DUODENAL ULCER PERFORATION (H): Status: RESOLVED | Noted: 2019-12-29 | Resolved: 2020-01-18

## 2020-01-18 PROBLEM — D62 ACUTE BLOOD LOSS ANEMIA: Status: ACTIVE | Noted: 2020-01-18

## 2020-01-18 PROBLEM — K26.5 PERFORATED DUODENAL ULCER (H): Status: ACTIVE | Noted: 2019-12-29

## 2020-01-18 PROBLEM — G47.33 OSA (OBSTRUCTIVE SLEEP APNEA): Status: ACTIVE | Noted: 2020-01-18

## 2020-01-18 PROBLEM — G92.9 TOXIC ENCEPHALOPATHY: Status: ACTIVE | Noted: 2020-01-18

## 2020-01-18 PROBLEM — N17.9 AKI (ACUTE KIDNEY INJURY) (H): Status: ACTIVE | Noted: 2020-01-18

## 2020-01-18 PROBLEM — F11.90 METHADONE USE: Status: RESOLVED | Noted: 2017-06-01 | Resolved: 2020-01-18

## 2020-01-18 LAB
ANION GAP SERPL CALCULATED.3IONS-SCNC: 3 MMOL/L (ref 3–14)
BASE EXCESS BLDA CALC-SCNC: 8.6 MMOL/L
BUN SERPL-MCNC: 26 MG/DL (ref 7–30)
CALCIUM SERPL-MCNC: 9.3 MG/DL (ref 8.5–10.1)
CHLORIDE SERPL-SCNC: 97 MMOL/L (ref 94–109)
CO2 SERPL-SCNC: 35 MMOL/L (ref 20–32)
CREAT SERPL-MCNC: 0.78 MG/DL (ref 0.52–1.04)
ERYTHROCYTE [DISTWIDTH] IN BLOOD BY AUTOMATED COUNT: 15.3 % (ref 10–15)
GFR SERPL CREATININE-BSD FRML MDRD: 80 ML/MIN/{1.73_M2}
GLUCOSE BLDC GLUCOMTR-MCNC: 112 MG/DL (ref 70–99)
GLUCOSE BLDC GLUCOMTR-MCNC: 145 MG/DL (ref 70–99)
GLUCOSE BLDC GLUCOMTR-MCNC: 147 MG/DL (ref 70–99)
GLUCOSE BLDC GLUCOMTR-MCNC: 153 MG/DL (ref 70–99)
GLUCOSE BLDC GLUCOMTR-MCNC: 153 MG/DL (ref 70–99)
GLUCOSE BLDC GLUCOMTR-MCNC: 160 MG/DL (ref 70–99)
GLUCOSE SERPL-MCNC: 178 MG/DL (ref 70–99)
HCO3 BLD-SCNC: 36 MMOL/L (ref 21–28)
HCT VFR BLD AUTO: 32.7 % (ref 35–47)
HGB BLD-MCNC: 10 G/DL (ref 11.7–15.7)
LACTATE BLD-SCNC: 0.9 MMOL/L (ref 0.7–2)
MAGNESIUM SERPL-MCNC: 1.6 MG/DL (ref 1.6–2.3)
MCH RBC QN AUTO: 27.8 PG (ref 26.5–33)
MCHC RBC AUTO-ENTMCNC: 30.6 G/DL (ref 31.5–36.5)
MCV RBC AUTO: 91 FL (ref 78–100)
NT-PROBNP SERPL-MCNC: 2200 PG/ML (ref 0–900)
O2/TOTAL GAS SETTING VFR VENT: 100 %
OXYHGB MFR BLD: 98 % (ref 92–100)
PCO2 BLD: 65 MM HG (ref 35–45)
PH BLD: 7.35 PH (ref 7.35–7.45)
PHOSPHATE SERPL-MCNC: 4.3 MG/DL (ref 2.5–4.5)
PLATELET # BLD AUTO: 271 10E9/L (ref 150–450)
PO2 BLD: 147 MM HG (ref 80–105)
POTASSIUM SERPL-SCNC: 3.7 MMOL/L (ref 3.4–5.3)
RBC # BLD AUTO: 3.6 10E12/L (ref 3.8–5.2)
SODIUM SERPL-SCNC: 135 MMOL/L (ref 133–144)
TROPONIN I SERPL-MCNC: 0.04 UG/L (ref 0–0.04)
WBC # BLD AUTO: 11 10E9/L (ref 4–11)

## 2020-01-18 PROCEDURE — 84484 ASSAY OF TROPONIN QUANT: CPT | Performed by: INTERNAL MEDICINE

## 2020-01-18 PROCEDURE — 83880 ASSAY OF NATRIURETIC PEPTIDE: CPT | Performed by: INTERNAL MEDICINE

## 2020-01-18 PROCEDURE — 25000125 ZZHC RX 250: Performed by: INTERNAL MEDICINE

## 2020-01-18 PROCEDURE — 94640 AIRWAY INHALATION TREATMENT: CPT | Mod: 76

## 2020-01-18 PROCEDURE — 85027 COMPLETE CBC AUTOMATED: CPT | Performed by: INTERNAL MEDICINE

## 2020-01-18 PROCEDURE — 83735 ASSAY OF MAGNESIUM: CPT | Performed by: INTERNAL MEDICINE

## 2020-01-18 PROCEDURE — 40000275 ZZH STATISTIC RCP TIME EA 10 MIN

## 2020-01-18 PROCEDURE — 94640 AIRWAY INHALATION TREATMENT: CPT

## 2020-01-18 PROCEDURE — 40000239 ZZH STATISTIC VAT ROUNDS

## 2020-01-18 PROCEDURE — 83605 ASSAY OF LACTIC ACID: CPT | Performed by: NURSE PRACTITIONER

## 2020-01-18 PROCEDURE — 12000000 ZZH R&B MED SURG/OB

## 2020-01-18 PROCEDURE — 93010 ELECTROCARDIOGRAM REPORT: CPT | Performed by: INTERNAL MEDICINE

## 2020-01-18 PROCEDURE — 93005 ELECTROCARDIOGRAM TRACING: CPT

## 2020-01-18 PROCEDURE — 25000128 H RX IP 250 OP 636

## 2020-01-18 PROCEDURE — 27210437 ZZH NUTRITION PRODUCT SEMIELEM INTERMED LITER

## 2020-01-18 PROCEDURE — 99233 SBSQ HOSP IP/OBS HIGH 50: CPT | Performed by: INTERNAL MEDICINE

## 2020-01-18 PROCEDURE — 00000146 ZZHCL STATISTIC GLUCOSE BY METER IP

## 2020-01-18 PROCEDURE — 25000132 ZZH RX MED GY IP 250 OP 250 PS 637: Mod: GY | Performed by: INTERNAL MEDICINE

## 2020-01-18 PROCEDURE — 25000131 ZZH RX MED GY IP 250 OP 636 PS 637: Mod: GY | Performed by: INTERNAL MEDICINE

## 2020-01-18 PROCEDURE — 25000128 H RX IP 250 OP 636: Performed by: INTERNAL MEDICINE

## 2020-01-18 PROCEDURE — 71045 X-RAY EXAM CHEST 1 VIEW: CPT

## 2020-01-18 PROCEDURE — 84100 ASSAY OF PHOSPHORUS: CPT | Performed by: INTERNAL MEDICINE

## 2020-01-18 PROCEDURE — 36600 WITHDRAWAL OF ARTERIAL BLOOD: CPT

## 2020-01-18 PROCEDURE — 80048 BASIC METABOLIC PNL TOTAL CA: CPT | Performed by: INTERNAL MEDICINE

## 2020-01-18 PROCEDURE — 99291 CRITICAL CARE FIRST HOUR: CPT | Performed by: NURSE PRACTITIONER

## 2020-01-18 PROCEDURE — 82805 BLOOD GASES W/O2 SATURATION: CPT | Performed by: NURSE PRACTITIONER

## 2020-01-18 RX ORDER — FUROSEMIDE 40 MG
40 TABLET ORAL DAILY
Status: DISCONTINUED | OUTPATIENT
Start: 2020-01-19 | End: 2020-01-21 | Stop reason: HOSPADM

## 2020-01-18 RX ORDER — FUROSEMIDE 10 MG/ML
40 INJECTION INTRAMUSCULAR; INTRAVENOUS ONCE
Status: COMPLETED | OUTPATIENT
Start: 2020-01-18 | End: 2020-01-18

## 2020-01-18 RX ORDER — FUROSEMIDE 10 MG/ML
INJECTION INTRAMUSCULAR; INTRAVENOUS
Status: COMPLETED
Start: 2020-01-18 | End: 2020-01-18

## 2020-01-18 RX ORDER — GABAPENTIN 400 MG/1
800 CAPSULE ORAL 3 TIMES DAILY
Status: DISCONTINUED | OUTPATIENT
Start: 2020-01-18 | End: 2020-01-21 | Stop reason: HOSPADM

## 2020-01-18 RX ADMIN — METHADONE HYDROCHLORIDE 20 MG: 10 TABLET ORAL at 20:33

## 2020-01-18 RX ADMIN — INSULIN ASPART 1 UNITS: 100 INJECTION, SOLUTION INTRAVENOUS; SUBCUTANEOUS at 05:57

## 2020-01-18 RX ADMIN — POTASSIUM CHLORIDE 20 MEQ: 1.5 POWDER, FOR SOLUTION ORAL at 08:46

## 2020-01-18 RX ADMIN — INSULIN ASPART 1 UNITS: 100 INJECTION, SOLUTION INTRAVENOUS; SUBCUTANEOUS at 00:58

## 2020-01-18 RX ADMIN — INSULIN ASPART 1 UNITS: 100 INJECTION, SOLUTION INTRAVENOUS; SUBCUTANEOUS at 09:01

## 2020-01-18 RX ADMIN — LEVOTHYROXINE SODIUM 88 MCG: 88 TABLET ORAL at 08:47

## 2020-01-18 RX ADMIN — Medication 40 MG: at 08:47

## 2020-01-18 RX ADMIN — OXYCODONE HYDROCHLORIDE 5 MG: 5 TABLET ORAL at 01:04

## 2020-01-18 RX ADMIN — INSULIN GLARGINE 20 UNITS: 100 INJECTION, SOLUTION SUBCUTANEOUS at 08:47

## 2020-01-18 RX ADMIN — OXYCODONE HYDROCHLORIDE 5 MG: 5 TABLET ORAL at 12:55

## 2020-01-18 RX ADMIN — GABAPENTIN 800 MG: 250 SOLUTION ORAL at 08:47

## 2020-01-18 RX ADMIN — INSULIN ASPART 1 UNITS: 100 INJECTION, SOLUTION INTRAVENOUS; SUBCUTANEOUS at 16:42

## 2020-01-18 RX ADMIN — PRAVASTATIN SODIUM 20 MG: 20 TABLET ORAL at 08:47

## 2020-01-18 RX ADMIN — METHADONE HYDROCHLORIDE 10 MG: 10 TABLET ORAL at 14:59

## 2020-01-18 RX ADMIN — LEVALBUTEROL HYDROCHLORIDE 1.25 MG: 1.25 SOLUTION, CONCENTRATE RESPIRATORY (INHALATION) at 20:51

## 2020-01-18 RX ADMIN — AMLODIPINE BESYLATE 5 MG: 5 TABLET ORAL at 08:47

## 2020-01-18 RX ADMIN — FUROSEMIDE 40 MG: 10 INJECTION, SOLUTION INTRAMUSCULAR; INTRAVENOUS at 05:16

## 2020-01-18 RX ADMIN — ALBUTEROL SULFATE 2.5 MG: 2.5 SOLUTION RESPIRATORY (INHALATION) at 14:40

## 2020-01-18 RX ADMIN — FUROSEMIDE 40 MG: 10 INJECTION INTRAMUSCULAR; INTRAVENOUS at 05:16

## 2020-01-18 RX ADMIN — METHADONE HYDROCHLORIDE 20 MG: 10 TABLET ORAL at 08:47

## 2020-01-18 RX ADMIN — INSULIN ASPART 1 UNITS: 100 INJECTION, SOLUTION INTRAVENOUS; SUBCUTANEOUS at 12:43

## 2020-01-18 RX ADMIN — LEVALBUTEROL HYDROCHLORIDE 1.25 MG: 1.25 SOLUTION, CONCENTRATE RESPIRATORY (INHALATION) at 07:22

## 2020-01-18 RX ADMIN — FUROSEMIDE 20 MG: 20 TABLET ORAL at 08:47

## 2020-01-18 RX ADMIN — GABAPENTIN 800 MG: 250 SOLUTION ORAL at 15:43

## 2020-01-18 RX ADMIN — ENOXAPARIN SODIUM 40 MG: 40 INJECTION SUBCUTANEOUS at 15:43

## 2020-01-18 RX ADMIN — ALBUTEROL SULFATE 2.5 MG: 2.5 SOLUTION RESPIRATORY (INHALATION) at 11:19

## 2020-01-18 RX ADMIN — Medication 40 MG: at 15:43

## 2020-01-18 RX ADMIN — ALBUTEROL SULFATE 2.5 MG: 2.5 SOLUTION RESPIRATORY (INHALATION) at 17:29

## 2020-01-18 RX ADMIN — OXYCODONE HYDROCHLORIDE 5 MG: 5 TABLET ORAL at 21:41

## 2020-01-18 RX ADMIN — ALBUTEROL SULFATE 2.5 MG: 2.5 SOLUTION RESPIRATORY (INHALATION) at 22:55

## 2020-01-18 RX ADMIN — LEVALBUTEROL HYDROCHLORIDE 1.25 MG: 1.25 SOLUTION, CONCENTRATE RESPIRATORY (INHALATION) at 05:29

## 2020-01-18 ASSESSMENT — ACTIVITIES OF DAILY LIVING (ADL)
ADLS_ACUITY_SCORE: 24
ADLS_ACUITY_SCORE: 23
ADLS_ACUITY_SCORE: 24
ADLS_ACUITY_SCORE: 23
ADLS_ACUITY_SCORE: 23
ADLS_ACUITY_SCORE: 24

## 2020-01-18 NOTE — PROGRESS NOTES
Pt has received all nebs per MD order. Pt was weaned off Bipap this am. Placed on 4 L NC. She has been weaned down to 2 L NC Spo2 94-98%. BS: diminished.    RT will continue to monitor and follow      1/18/2020  Anay Mcarthur RT

## 2020-01-18 NOTE — PROGRESS NOTES
Patient refused BiPAP NOC. Remains on 1L NC. LS diminished. SPO2@ 90s. All nebs given as ordered. Aerobika-self. Will continue to monitor.

## 2020-01-18 NOTE — PROGRESS NOTES
"[facility]    Hospitalist Progress Note    Assessment & Plan   64 year old female who was admitted on 12/29/2019 with acute abdomin with surgery on 1/1/20 showing perforated ulcer, and recovery complicated by DM type 2, Chronic pain with Fibromyalgia on Methadone, HTN, neurogenic bladder with suprapubic catheter and chronic weakness -- ambulates with a electric scooter:      Impression:   Principal Problem:    Perforated duodenal ulcer -- S/P Surgery 1/1/2020    Septic shock (H)    Acute blood loss anemia -- improved     Acute Toxic encephalopathy -- resolved     ROLANDO (acute kidney injury) -- better         Acute respiratory failure with hypoxia (H)      ANIYA -- noncompliant with CPAP      NSTEMI (non-ST elevated myocardial infarction)    -- suspect demand ischemia     Severe Hypaalbuminemia -- Albumin 1.3   -- this is contributing to 3rd spacing of fluid   -- increase lasix to 40 mg daily, and encourage good nutrition     Active Problems:    DM type 2, w Neuropathy -- Hgb A1C 7.1 on 1/7/20      Fibromyalgia, Chronic Pain -- On Methadone      Hypertension goal BP (blood pressure) < 140/90      Neurogenic bladder, S/P Suprapubic Catheter       Morbid obesity -- BMI 57.8      NSTEMI (non-ST elevated myocardial infarction)       ANIYA -- noncompliant with CPAP   -- needs BIPAP at bedtime, now ordered      Plan:  Continue Postop care -- discussed with patient, she can not go home as is, she is too weak, TCU has been recommended and she has refused up til now but agrees to TCU \"if I have too\".  Discussed with  -- he says he is unable to take care of her in present state, but he hasn't been here to visit and can not comment further -- but would like to be called with update daily.     DVT Prophylaxis: Pneumatic Compression Devices  Code Status: Full Code    Disposition: Expected discharge to TCU in 1-2 days    Khurram Moe MD  Pager [phone]  Cell Phone [phone]  Text Page (7am to " "6pm)  (35 min total -- seen twice)   Interval History   Last night was found with O2 sat 66% -- acutely started on BiPAP.  She has known sleep apnea, but stopped using CPAP about a year ago because \"I would wake up with the mask off so I stopped using it\".  Still has moderate abdominal pain, but reports bowels moving, but having hard stools.      Physical Exam   Temp: 97.8  F (36.6  C) Temp src: Oral BP: 100/56 Pulse: 67 Heart Rate: 60 Resp: 16 SpO2: 95 % O2 Device: Nasal cannula Oxygen Delivery: 3 LPM  Vitals:    01/12/20 0636 01/14/20 0644 01/16/20 0500   Weight: (!) 170 kg (374 lb 12.5 oz) (!) 168.7 kg (371 lb 14.7 oz) (!) 155 kg (341 lb 11.4 oz)     Vital Signs with Ranges  Temp:  [97.8  F (36.6  C)-98.8  F (37.1  C)] 97.8  F (36.6  C)  Pulse:  [51-67] 67  Heart Rate:  [] 60  Resp:  [16-44] 16  BP: (100-200)/() 100/56  FiO2 (%):  [50 %-100 %] 50 %  SpO2:  [84 %-100 %] 95 %  I/O last 3 completed shifts:  In: 2893 [P.O.:490; NG/GT:263]  Out: 3327 [Urine:3100; Emesis/NG output:200; Drains:27]    # Pain Assessment:  Current Pain Score 1/18/2020   Patient currently in pain? yes   Pain score (0-10) 6   Pain location -   Pain descriptors -   CPOT pain score -   - Bhavani is experiencing pain due to abdominal surgery. Pain management was discussed and the plan was created in a collaborative fashion.  Bhavani's response to the current recommendations: engaged  - Please see the plan for pain management as documented above      Constitutional: Awake, alert, cooperative, no apparent distress  Respiratory: Clear to auscultation bilaterally, no crackles or wheezing  Cardiovascular: Regular rate and rhythm, normal S1 and S2, and no murmur noted  GI: Normal bowel sounds, soft, non-distended but obese, and mild diffuse tenderness   Extrem: No calf tenderness, no ankle edema  Neuro: Ox2.5, no focal motor or sensory deficits but generalized weakness     Medications     dextrose       - MEDICATION INSTRUCTIONS -       - " MEDICATION INSTRUCTIONS -       - MEDICATION INSTRUCTIONS -       - MEDICATION INSTRUCTIONS -       ACE/ARB/ARNI NOT PRESCRIBED       BETA BLOCKER NOT PRESCRIBED       STATIN NOT PRESCRIBED         albuterol  2.5 mg Nebulization Q4H While awake     amLODIPine  5 mg Oral or Feeding Tube Daily     enoxaparin ANTICOAGULANT  40 mg Subcutaneous Q24H     furosemide  20 mg Oral Daily     gabapentin  800 mg Oral or Feeding Tube TID     insulin aspart  1-12 Units Subcutaneous Q4H     insulin glargine  20 Units Subcutaneous QAM AC     levothyroxine  88 mcg Oral or Feeding Tube QAM AC     methadone  10 mg Oral or Feeding Tube Q24H     methadone  20 mg Oral or Feeding Tube Q12H KENIA     pantoprazole  40 mg Oral or Feeding Tube BID AC     potassium chloride  20 mEq Oral Daily     pravastatin  20 mg Oral Daily     sodium chloride (PF)  10 mL Intracatheter Q8H       Data   Recent Labs   Lab 01/18/20  0500 01/17/20  0620 01/16/20  1940 01/16/20  0600  01/13/20  0545   WBC 11.0 11.4*  --  15.1*   < > 16.7*   HGB 10.0* 9.0*  --  9.5*   < > 9.1*   MCV 91 91  --  90   < > 91    224  --  229   < > 194    138  --  141   < > 139   POTASSIUM 3.7 3.7 3.8 3.3*   < > 3.7   CHLORIDE 97 99  --  99   < > 103   CO2 35* 37*  --  39*   < > 32   BUN 26 26  --  28   < > 35*   CR 0.78 0.81  --  0.73   < > 0.85   ANIONGAP 3 2*  --  3   < > 4   DORY 9.3 9.1  --  9.4   < > 9.3   * 147*  --  140*   < > 127*   ALBUMIN  --   --   --   --   --  1.3*   PROTTOTAL  --   --   --   --   --  4.6*   BILITOTAL  --   --   --   --   --  0.4   ALKPHOS  --   --   --   --   --  82   ALT  --   --   --   --   --  36   AST  --   --   --   --   --  28   TROPI 0.037  --   --   --   --   --     < > = values in this interval not displayed.       Imaging:   Recent Results (from the past 24 hour(s))   XR Chest Port 1 View    Narrative    EXAM: XR CHEST PORT 1 VW  LOCATION: [facility]  DATE/TIME: 1/18/2020 4:45 AM    INDICATION: Shortness of  breath  COMPARISON: 12/29/2019      Impression    IMPRESSION: Stable cardiac size. Rotation to the right. Right PICC line tip at the cavoatrial junction. Bibasilar atelectasis or infiltrate and bilateral pleural effusions. Prominent pulmonary vasculature. Postsurgical change cervical spine.

## 2020-01-18 NOTE — PLAN OF CARE
Dx:hem shock, perf ulcer repair Vs:stable on RA-3L NC. Tele:discontinued per verbal order from hospitalist. A/O:x4. CWMS:Edema generalized +1. Pain level/controlled with:PRN oxy and methadone. Up with:Lift Ax2. Tolerating clear liquid diet. No N/V. Passing gas. Suprapubic catheter in place with adequate output. GI:Ax4. Resp:Diminished. Plan: Will continue monitor. Requiring bipap HS. THAI, malcot, and gastric tubes in place. Repo q2h. MRSA precautions maintained. Feedings going at 60/hr.

## 2020-01-18 NOTE — PROGRESS NOTES
LATE ENTRY:    RRT called for O2 sats 60's, diaphoretic, and C/O unable to breathe.    Nebs, BiPAP, EKG, CXR, and labs ordered and completed.    Pt currently maintaining adequate O2 via BiPAP. Admits to relief.    Will continue to monitor and await potential new orders.

## 2020-01-18 NOTE — CODE/RAPID RESPONSE
"[facility]    House FIONA RRT Note  1/18/2020   Time Called: 0428    RRT called for: Respiratory distress    Assessment & Plan     Acute hypoxic respiratory failure suspect 2/2 flash pulmonary edema in setting of hypertensive emergency, bilateral pleural effusions, atelectasis with recent history of NSTEMI, stress cardiomyopathy 2/2 sepsis and aspiration PNA and PMH of asthma, ANIYA (refused CPAP).  Alternatively considered PE, PTX/tension PTX, mucous plug, aspiration, ACS, URI, angioedema  - Upon arrival, pt lying in bed, in severe respiratory distress, hypoxic, diaphoretic, pt reporting chest heaviness and inability to take a deep breath.  Pt tachypneic, RR 40s, use of chest and abdominal accessory muscles, very shallow inspiratory effort.  Per nursing, pt was on 1L O2 via NC; however, pt acutely became hypoxic to the 60s and had put on her light reporting \"I can't breathe\".  Pt's SBP 200s, HR 130s at time of arrival.  Pt with no air movement noted with auscultated and despite 15L+ O2 via oxymask, pt remained hypoxic O2 sats 80s%.  BVM efforts provided until Bipap arrived with improvement to 90-95% O2 sats.  With Bipap placement, pt's overall clinical status rapidly improved after ~ 15 min with RR now 10s, TV 300s, triggering Bipap > 97%, HR 90s, SBP 130s, decreased use of accessory muscles, no longer diaphoretic, afebrile.     INTERVENTIONS:  - Bipap now 12/8  - Hold TF until respiratory status improves  - Xopenex neb now  - Stat CXR  - Stat ABG  - Stat EKG  - Will place pt on telemetry monitoring at this time  - Stat troponin, lactic acid, BMP, CBC, BNP, Mg, Phos  - Will administer 40 mg IV lasix now after noting pronounced pulmonary vasculature on CXR  - Considered CT PE; however given rapid improvement on Bipap, will defer at this time    At the end of the RRT pt very much improved, on Bipap, 12/8/50%, RR 16, pulling TV ~ 300, HR 80s, SBP 130s, now resting, appears comfortable.    Discussed with " "and defer further cares to nursing and hospitalist.    Interval History     Bhavani White is a 64 year old female who was admitted on 12/29/2019 for perforated duodenal ulcer with associated septic shock and bowel ischemia.    Medical history significant for: DMII, chronic cystitis s/p chronic SP catheter, chronic pain on methadone, asthma, ANIYA, hypothyroidism    Code Status: Full Code    Allergies   Allergies   Allergen Reactions     Povidone Iodine      \"mild skin irritation\" per patient report     Toradol [Ketorolac] Other (See Comments)     Pt gets nightmares     Tramadol Other (See Comments)     Physical Exam   Vital Signs with Ranges:  Temp:  [98  F (36.7  C)-98.8  F (37.1  C)] 98.5  F (36.9  C)  Pulse:  [51-80] 51  Heart Rate:  [72-88] 78  Resp:  [16-20] 16  BP: (110-200)/() 196/83  SpO2:  [84 %-96 %] 84 %  I/O last 3 completed shifts:  In: 2903 [P.O.:790; NG/GT:363]  Out: 4482 [Urine:3900; Emesis/NG output:550; Drains:32]    Constitutional: Pt lying in bed, awake, in severe respiratory distress, diaphoretic  Neck: No upper airway wheezes or stridor noted  Pulmonary: In severe respiratory distress, no air movement noted with auscultation, no obvious crackles or wheezes noted  Cardiovascular: Tachycardic, regular rate and rhythm, normal S1S2, no murmur, rub or gallop noted  GI: Round, nondistended, nontender to palpation  Skin/Integumen: Warm, diaphoretic  Neuro: Awake, follows commands, answers questions with short phrases  Psych:  Anxious appearing  Extremities: No peripheral edema noted    Data     EKG:  Interpreted by ELISSA Fiore CNP  Time reviewed: 0454  Symptoms at time of EKG: SOB   Rhythm: normal sinus   Rate: Normal  Axis: Normal  Ectopy: none  Conduction: normal  ST Segments/ T Waves: T wave inversion I and aVL  Q Waves: none  Comparison to prior: Unchanged from 1/11/20    Clinical Impression: no acute changes and non-specific EKG    ABG:  Recent Labs   Lab 01/18/20  0447   PH 7.35 "   PCO2 65*   PO2 147*   HCO3 36*   O2PER 100     Troponin:    Recent Labs   Lab Test 01/18/20  0500   TROPI 0.037     IMAGING: (X-ray/CT/MRI)   Recent Results (from the past 24 hour(s))   XR Chest Port 1 View    Narrative    EXAM: XR CHEST PORT 1 VW  LOCATION: [facility]  DATE/TIME: 1/18/2020 4:45 AM    INDICATION: Shortness of breath  COMPARISON: 12/29/2019      Impression    IMPRESSION: Stable cardiac size. Rotation to the right. Right PICC line tip at the cavoatrial junction. Bibasilar atelectasis or infiltrate and bilateral pleural effusions. Prominent pulmonary vasculature. Postsurgical change cervical spine.     CBC with Diff:  Recent Labs   Lab Test 01/18/20  0500  12/29/19  1535   WBC 11.0   < >  --    HGB 10.0*   < >  --    MCV 91   < >  --       < >  --    INR  --   --  1.61*    < > = values in this interval not displayed.      Comprehensive Metabolic Panel:  Recent Labs   Lab 01/18/20  0500  01/13/20  0545      < > 139   POTASSIUM 3.7   < > 3.7   CHLORIDE 97   < > 103   CO2 35*   < > 32   ANIONGAP 3   < > 4   *   < > 127*   BUN 26   < > 35*   CR 0.78   < > 0.85   GFRESTIMATED 80   < > 73   GFRESTBLACK >90   < > 84   DORY 9.3   < > 9.3   MAG  --   --  1.8   PHOS  --   --  2.9   PROTTOTAL  --   --  4.6*   ALBUMIN  --   --  1.3*   BILITOTAL  --   --  0.4   ALKPHOS  --   --  82   AST  --   --  28   ALT  --   --  36    < > = values in this interval not displayed.     Recent Labs   Lab 01/18/20  0500   LACT 0.9     Time Spent on this Encounter   I spent 45 minutes of critical care time on the unit/floor managing the care of Bhavani White. Upon evaluation, this patient had a high probability of imminent or life-threatening deterioration due to acute respiratory distress, which required my direct attention, intervention, and personal management. 100% of my time was spent at the bedside counseling the patient and/or coordinating care regarding services listed in this  note.    ELISSA Fiore [facility]   House F[facility]A

## 2020-01-18 NOTE — PLAN OF CARE
Neuro: Aox4. Anxious.     Respiratory: Transitions between RA and 1L NC. Diminished LS.     Cardiac: WNL.     GI/: Bowel sounds hypoactive x4 quads. BM 01/17. Adequate urine OP via suprapubic cath.     Skin: Pale, dry. Incisions and drains intact; sites WNL.     Pain: Attributed to back, hip. Repositioned and meds; refer to FS, MAR.     Mobility: Chair-fast; reposition q2 hours. Ax2, ceiling lift.     Diet: CL, tube feeding.    IVF: -    Plan of Care: Ambulation, pain control. Transition to TCU.      Will continue to monitor.

## 2020-01-18 NOTE — PROGRESS NOTES
General Surgery Progress Note    Assessment and Plan:  64 year old female S/P Procedure(s):  Perforated duodenal ulcer with hemorrhagic shock and bowel ischemia, s/p ext lap, TRUONG, Malecot drain placement with perforation, and wound VAC on 2019. Repeat ex-lap, TRUONG, closure of duodenal perforation over Malecot, G-tube/J-tube placements, delayed primary closure on 2020.    - Continue clears and tube feeds  - Continue Left THAI and Malecot drains and monitor output  - Again discussed need for TCU  - Med mngt per hospitalist, appreciate your help  - Discharge when medically able, remove staple prior to discharge   - Follow up with Dr. Lopez after CT scan in 3 weeks    Interval Hx:   Denies any complaints, tolerating clears, +Flatus/BM, UO adequate (suprapubic cath in place), ambulating.  Meds reviewed.    Exam:  Vitals: Blood pressure 112/68, pulse 51, temperature 98.5  F (36.9  C), temperature source Oral, resp. rate 18, weight (!) 155 kg (341 lb 11.4 oz), SpO2 99 %, not currently breastfeeding.  Temperature Temp  Av.4  F (36.9  C)  Min: 98  F (36.7  C)  Max: 98.8  F (37.1  C)   I/O last 3 completed shifts:  In: 2993 [P.O.:490; NG/GT:363]  Out: 4077 [Urine:3600; Emesis/NG output:450; Drains:27]    Abd: soft, nt, nd. Wound(s): c/d/i, no erythema or drainge. G tube venting, J tube feeds going, Malecot bilious, THAI drain serous.    Data:    Recent Labs   Lab Test 20  0500 20  0620 20  0600   WBC 11.0 11.4* 15.1*   HGB 10.0* 9.0* 9.5*   HCT 32.7* 28.9* 30.3*    224 229      Recent Labs   Lab Test 20  0500 20  0620 20  1940 20  0600    138  --  141   POTASSIUM 3.7 3.7 3.8 3.3*   CHLORIDE 97 99  --  99   CO2 35* 37*  --  39*   BUN 26 26  --  28   CR 0.78 0.81  --  0.73     Esther Bonilla PA-C  [facility]  [phone]

## 2020-01-18 NOTE — PROGRESS NOTES
RRT called for respiratory distress. Placed pt on BIPAP, cooper ABG, nebulizer given, and EKG performed.     CPAP/BiPAP Settings  IPAP: 12  EPAP: 6  Rate: 12  FiO2: 100  Timed Inspiration (sec): .90    CPAP/BiPAP/AVAPS/AVAPS AE Alarms  High Pressure: 20  Low Pressure: 5  Low Pressure Delay: 20  High Rate (breaths/min): 45  Low Rate (breaths/min): 5  Alarm Volume Level: 10    CPAP/BiPAP/AVAPS/AVAPS AE Patient Assessment  Barriers Applied: gel pad in place      Plan:  Will continue to monitor        ABG's as followed:  Niko's test performed. Collateral circulation confirmed. Right radial artery puncture.    Recent Labs   Lab 01/18/20  0447   PH 7.35   PCO2 65*   PO2 147*   HCO3 36*   O2PER 100     FIO2 decreased to 50% BIPAP  Pt not in distress. No longer sweating and is now peacefully resting.

## 2020-01-19 ENCOUNTER — APPOINTMENT (OUTPATIENT)
Dept: SPEECH THERAPY | Facility: CLINIC | Age: 65
DRG: 853 | End: 2020-01-19
Payer: MEDICARE

## 2020-01-19 ENCOUNTER — APPOINTMENT (OUTPATIENT)
Dept: PHYSICAL THERAPY | Facility: CLINIC | Age: 65
DRG: 853 | End: 2020-01-19
Payer: MEDICARE

## 2020-01-19 LAB
ANION GAP SERPL CALCULATED.3IONS-SCNC: 3 MMOL/L (ref 3–14)
BUN SERPL-MCNC: 28 MG/DL (ref 7–30)
CALCIUM SERPL-MCNC: 9.3 MG/DL (ref 8.5–10.1)
CHLORIDE SERPL-SCNC: 96 MMOL/L (ref 94–109)
CO2 SERPL-SCNC: 37 MMOL/L (ref 20–32)
CREAT SERPL-MCNC: 0.77 MG/DL (ref 0.52–1.04)
GFR SERPL CREATININE-BSD FRML MDRD: 82 ML/MIN/{1.73_M2}
GLUCOSE SERPL-MCNC: 152 MG/DL (ref 70–99)
POTASSIUM SERPL-SCNC: 3.9 MMOL/L (ref 3.4–5.3)
SODIUM SERPL-SCNC: 136 MMOL/L (ref 133–144)

## 2020-01-19 PROCEDURE — 25000125 ZZHC RX 250: Performed by: INTERNAL MEDICINE

## 2020-01-19 PROCEDURE — 99232 SBSQ HOSP IP/OBS MODERATE 35: CPT | Performed by: INTERNAL MEDICINE

## 2020-01-19 PROCEDURE — 25000131 ZZH RX MED GY IP 250 OP 636 PS 637: Mod: GY | Performed by: INTERNAL MEDICINE

## 2020-01-19 PROCEDURE — 25000132 ZZH RX MED GY IP 250 OP 250 PS 637: Mod: GY | Performed by: INTERNAL MEDICINE

## 2020-01-19 PROCEDURE — 12000000 ZZH R&B MED SURG/OB

## 2020-01-19 PROCEDURE — 27210437 ZZH NUTRITION PRODUCT SEMIELEM INTERMED LITER

## 2020-01-19 PROCEDURE — 97530 THERAPEUTIC ACTIVITIES: CPT | Mod: GP | Performed by: PHYSICAL THERAPIST

## 2020-01-19 PROCEDURE — 94640 AIRWAY INHALATION TREATMENT: CPT | Mod: 76

## 2020-01-19 PROCEDURE — 40000239 ZZH STATISTIC VAT ROUNDS

## 2020-01-19 PROCEDURE — 94799 UNLISTED PULMONARY SVC/PX: CPT

## 2020-01-19 PROCEDURE — 40000275 ZZH STATISTIC RCP TIME EA 10 MIN

## 2020-01-19 PROCEDURE — 92526 ORAL FUNCTION THERAPY: CPT | Mod: GN

## 2020-01-19 PROCEDURE — 94640 AIRWAY INHALATION TREATMENT: CPT

## 2020-01-19 PROCEDURE — 80048 BASIC METABOLIC PNL TOTAL CA: CPT | Performed by: INTERNAL MEDICINE

## 2020-01-19 PROCEDURE — 25000132 ZZH RX MED GY IP 250 OP 250 PS 637: Mod: GY

## 2020-01-19 PROCEDURE — 25000128 H RX IP 250 OP 636: Performed by: INTERNAL MEDICINE

## 2020-01-19 RX ORDER — DOCUSATE SODIUM 100 MG/1
100 CAPSULE, LIQUID FILLED ORAL 2 TIMES DAILY
Status: DISCONTINUED | OUTPATIENT
Start: 2020-01-19 | End: 2020-01-20

## 2020-01-19 RX ORDER — TRAZODONE HYDROCHLORIDE 100 MG/1
100 TABLET ORAL AT BEDTIME
Status: DISCONTINUED | OUTPATIENT
Start: 2020-01-19 | End: 2020-01-21 | Stop reason: HOSPADM

## 2020-01-19 RX ADMIN — DOCUSATE SODIUM 100 MG: 100 CAPSULE, LIQUID FILLED ORAL at 10:33

## 2020-01-19 RX ADMIN — METHADONE HYDROCHLORIDE 20 MG: 10 TABLET ORAL at 08:44

## 2020-01-19 RX ADMIN — GABAPENTIN 800 MG: 400 CAPSULE ORAL at 21:33

## 2020-01-19 RX ADMIN — ALBUTEROL SULFATE 2.5 MG: 2.5 SOLUTION RESPIRATORY (INHALATION) at 02:35

## 2020-01-19 RX ADMIN — GABAPENTIN 800 MG: 400 CAPSULE ORAL at 00:14

## 2020-01-19 RX ADMIN — GABAPENTIN 800 MG: 400 CAPSULE ORAL at 08:44

## 2020-01-19 RX ADMIN — OXYCODONE HYDROCHLORIDE 5 MG: 5 TABLET ORAL at 02:34

## 2020-01-19 RX ADMIN — FUROSEMIDE 40 MG: 40 TABLET ORAL at 08:44

## 2020-01-19 RX ADMIN — TRAZODONE HYDROCHLORIDE 100 MG: 100 TABLET ORAL at 21:33

## 2020-01-19 RX ADMIN — OXYCODONE HYDROCHLORIDE 5 MG: 5 TABLET ORAL at 14:59

## 2020-01-19 RX ADMIN — METHADONE HYDROCHLORIDE 10 MG: 10 TABLET ORAL at 14:59

## 2020-01-19 RX ADMIN — INSULIN GLARGINE 24 UNITS: 100 INJECTION, SOLUTION SUBCUTANEOUS at 06:53

## 2020-01-19 RX ADMIN — ALBUTEROL SULFATE 2.5 MG: 2.5 SOLUTION RESPIRATORY (INHALATION) at 06:17

## 2020-01-19 RX ADMIN — ENOXAPARIN SODIUM 40 MG: 40 INJECTION SUBCUTANEOUS at 15:44

## 2020-01-19 RX ADMIN — OXYCODONE HYDROCHLORIDE 5 MG: 5 TABLET ORAL at 10:33

## 2020-01-19 RX ADMIN — GABAPENTIN 800 MG: 400 CAPSULE ORAL at 15:44

## 2020-01-19 RX ADMIN — METHADONE HYDROCHLORIDE 20 MG: 10 TABLET ORAL at 21:33

## 2020-01-19 RX ADMIN — Medication 40 MG: at 15:44

## 2020-01-19 RX ADMIN — LEVOTHYROXINE SODIUM 88 MCG: 88 TABLET ORAL at 06:53

## 2020-01-19 RX ADMIN — AMLODIPINE BESYLATE 5 MG: 5 TABLET ORAL at 08:44

## 2020-01-19 RX ADMIN — POTASSIUM CHLORIDE 20 MEQ: 1.5 POWDER, FOR SOLUTION ORAL at 08:43

## 2020-01-19 RX ADMIN — ALBUTEROL SULFATE 2.5 MG: 2.5 SOLUTION RESPIRATORY (INHALATION) at 14:43

## 2020-01-19 RX ADMIN — Medication 40 MG: at 08:43

## 2020-01-19 RX ADMIN — ALBUTEROL SULFATE 2.5 MG: 2.5 SOLUTION RESPIRATORY (INHALATION) at 19:13

## 2020-01-19 RX ADMIN — ALBUTEROL SULFATE 2.5 MG: 2.5 SOLUTION RESPIRATORY (INHALATION) at 10:52

## 2020-01-19 RX ADMIN — ALBUTEROL SULFATE 2.5 MG: 2.5 SOLUTION RESPIRATORY (INHALATION) at 23:49

## 2020-01-19 RX ADMIN — DOCUSATE SODIUM 100 MG: 100 CAPSULE, LIQUID FILLED ORAL at 21:33

## 2020-01-19 RX ADMIN — PRAVASTATIN SODIUM 20 MG: 20 TABLET ORAL at 08:44

## 2020-01-19 ASSESSMENT — ACTIVITIES OF DAILY LIVING (ADL)
ADLS_ACUITY_SCORE: 22
ADLS_ACUITY_SCORE: 24
ADLS_ACUITY_SCORE: 24
ADLS_ACUITY_SCORE: 26
ADLS_ACUITY_SCORE: 24
ADLS_ACUITY_SCORE: 22

## 2020-01-19 NOTE — PLAN OF CARE
Pt is A+Ox4, VSS. Pt reports shortness of breath, oxygen saturation maintained over 92% on room air. PRN neb treatments given. Drains in place, low output. Reports chronic back pain. Turned and repositioned every two hours.Suprapubic catheter leaked last night, linens changed. Pt refused BiPAP, even after reporting shortness of breath.

## 2020-01-19 NOTE — PROGRESS NOTES
SW:  Discharge Planner   Discharge Plans in progress: TCU referrals resent with planned discharge date of 1/20/20.  Barriers to discharge plan: None identified  Follow up plan: SW to continue to follow and assist with discharge planning.    SHIMON Duran  Daytime (8:00am-4:30pm): [phone]  After-Hours SW Pager (4:30pm-11:30pm): [phone]            Entered by: Neva Mackey 01/19/2020 9:11 AM

## 2020-01-19 NOTE — PROGRESS NOTES
"Pt called stating the BiPAP mask was not sealing well and was blowing into her eyes too much.  Encouraged pt to try a little longer as the mask is intended to prevent the episodic respiratory distress she experiences at night. Leak rate on BiPAP is as low as 2.  Attempted to adjust mask, however, was not able to achieve an acceptable fit per pt.  Pt states, \"If that is how it will fit I'm not going to wear it, take it off.\"  Mask removed, pt currently satting 95% on RA.  Notified bedside RN and RT of pt refusal.  "

## 2020-01-19 NOTE — PROGRESS NOTES
Pt has rcvd all nebs per MD order.   Pt was encouraged to use IS and Aerobika. IS level was up to 8-- ml today with encouragement.     BS diminished t/o.    RT will continue to follow    Anay Mcarthur RT

## 2020-01-19 NOTE — PROGRESS NOTES
General Surgery Progress Note    Assessment and Plan:  64 year old female S/P Procedure(s):  Perforated duodenal ulcer with hemorrhagic shock and bowel ischemia, s/p ext lap, TRUONG, Malecot drain placement with perforation, and wound VAC on 2019. Repeat ex-lap, TRUONG, closure of duodenal perforation over Malecot, G-tube/J-tube placements, delayed primary closure on 2020.    - Continue clears and tube feeds  - Continue Left THAI and Malecot drains and monitor output  - Med mngt per hospitalist, appreciate your help  - Discharge to TCU when medically able, patient agress to go to TCU  - Remove staple prior to discharge   - Follow up with Dr. Lopez after CT scan in 3 weeks     Interval Hx:   Feeling better, agrees to TCU, tolerating clears, +Flatus/BM, UO adequate (suprapubic cath in place).  Meds reviewed.    Exam:  Vitals: Blood pressure 104/54, pulse 67, temperature 98  F (36.7  C), temperature source Oral, resp. rate 18, weight 92.9 kg (204 lb 12.9 oz), SpO2 98 %, not currently breastfeeding.  Temperature Temp  Av.4  F (36.9  C)  Min: 97.8  F (36.6  C)  Max: 98.8  F (37.1  C)   I/O last 3 completed shifts:  In: 250 [P.O.:240; I.V.:10]  Out: 4513 [Urine:4200; Emesis/NG output:230; Drains:83]    Abd: soft, nt, nd. Wound(s): c/d/i, no erythema or drainge. G tube venting, J tube feeds going, Malecot bilious, THAI drain serous.    Data:  Recent Labs   Lab Test 20  0535 20  0500 20  0620    135 138   POTASSIUM 3.9 3.7 3.7   CHLORIDE 96 97 99   CO2 37* 35* 37*   BUN 28 26 26   CR 0.77 0.78 0.81       Esther Bonilla PA-C  [facility]  [phone]

## 2020-01-19 NOTE — PLAN OF CARE
"Discharge Planner PT   Patient plan for discharge: TCU- \"I wish I was going home.\"   Current status: Rolling L and R, Max A, increased pain. Donned universal sling size XL for bed>chair transfer. Paused for brief dangle at EOB, pt reports \"too squished\" to get in a good breath. Completed bed>chair via sling. Up in chair pt reports \"My butt feels like it is splitting.\" Re-lifted the pt and adjusted pillows (pt with pressure relief cushion in place), pt reports improved comfort. Pt able to swing LEs, weight shift and complete partial chair push ups.   Barriers to return to prior living situation: Level of assist, impaired activity tolerance and strength.   Recommendations for discharge: TCU  Rationale for recommendations: Pt will benefit from continued skilled rehab services to progress safety and independence with functional mobility.       Entered by: Pam Hendrickson 01/19/2020 2:47 PM       "

## 2020-01-19 NOTE — PLAN OF CARE
Discharge Planner SLP   Patient plan for discharge: TCU  Current status: Swallow tx completed with ice chips, thin liquids (still clears per surgery). Pt with x1 cough when partially reclined upon entering room, once positioned upright no signs/sx aspiration noted.   Barriers to return to prior living situation: None per SLP  Recommendations for discharge: TCU  Rationale for recommendations: SLP to follow given swallow function is below baseline        Entered by: iYsel Deleon 01/19/2020 11:42 AM

## 2020-01-19 NOTE — PLAN OF CARE
Dx:hem shock, perf ulcer repair Vs:stable on RA-3L NC. Tele:discontinued per verbal order from hospitalist. A/O:x4. CWMS:Edema generalized +1. Pain level/controlled with:PRN oxy and methadone. Up with:Lift Ax2. Tolerating clear liquid diet. No N/V. Passing gas. Suprapubic catheter in place with adequate output. GI:Ax4. Resp:Diminished. Plan: Will continue monitor. THAI, malcot, and gastric tubes in place. Repo q2h. MRSA precautions maintained. Feedings going at 60/hr. Pt worked with PT. Nebs PRN. Pt agree to TCU placement.

## 2020-01-19 NOTE — PROGRESS NOTES
[facility]    Hospitalist Progress Note    Assessment & Plan   64 year old female who was admitted on 12/29/2019 with acute abdomin with surgery on 1/1/20 showing perforated ulcer, and recovery complicated by DM type 2, Chronic pain with Fibromyalgia on Methadone, HTN, neurogenic bladder with suprapubic catheter and chronic weakness -- ambulates with a electric scooter:      Impression:   Principal Problem:    Perforated duodenal ulcer -- S/P Surgery 1/1/2020    Septic shock (H)    Acute blood loss anemia -- improved     Acute Toxic encephalopathy -- resolved     ROLANDO (acute kidney injury) -- better         Acute respiratory failure with hypoxia (H)      ANIYA -- noncompliant with CPAP      NSTEMI (non-ST elevated myocardial infarction)    -- suspect demand ischemia       Severe Hypaalbuminemia -- Albumin 1.3   -- this is contributing to 3rd spacing of fluid   -- continue lasix to 40 mg daily, and encourage good nutrition (tolerating tube feedings at 60 ml/hour, but only taking clear liquids PO)       Bilateral Pleural effusions -- related to hypoalbuminemia, breathing improved    -- improved with lasix     Active Problems:    DM type 2, w Neuropathy -- Hgb A1C 7.1 on 1/7/20      Fibromyalgia, Chronic Pain -- On Methadone      Hypertension goal BP (blood pressure) < 140/90      Neurogenic bladder, S/P Suprapubic Catheter       Morbid obesity -- BMI 57.8      NSTEMI (non-ST elevated myocardial infarction)       ANIYA -- noncompliant with CPAP   -- needs BIPAP at bedtime, now ordered      Plan:  Continue Postop care -- discussed with patient, she is agreeable to TCU in [facility].  Discussed with , and left message for .      DVT Prophylaxis: Pneumatic Compression Devices  Code Status: Full Code    Disposition: Expected discharge to TCU tomorrow ([facility]).      Khurram Moe MD  Pager [phone]  Cell Phone [phone]  Text Page (7am to 6pm)  Interval  "History   No problems with breathing last night, currently O2 sat 96% on room air.  She is agreeable to a TCU -- she admits she runs things at home and tells her  what he needs to do (?he might have mild dementia).  Chronic constipation -- probably related to Methadone.  She looks very comfortable -- but states her \"baseline pain is 5\", and current pain is 8 out of 10, and says the main pain is in her abdominal area.      Physical Exam   Temp: 98  F (36.7  C) Temp src: Oral BP: 104/54 Pulse: 67 Heart Rate: 68 Resp: 18 SpO2: 98 % O2 Device: Nasal cannula Oxygen Delivery: 3 LPM  Vitals:    01/14/20 0644 01/16/20 0500 01/19/20 0605   Weight: (!) 168.7 kg (371 lb 14.7 oz) (!) 155 kg (341 lb 11.4 oz) 92.9 kg (204 lb 12.9 oz)     Vital Signs with Ranges  Temp:  [97.8  F (36.6  C)-98.8  F (37.1  C)] 98  F (36.7  C)  Pulse:  [67-78] 67  Heart Rate:  [60-68] 68  Resp:  [16-18] 18  BP: (100-118)/(53-77) 104/54  SpO2:  [92 %-100 %] 98 %  I/O last 3 completed shifts:  In: 250 [P.O.:240; I.V.:10]  Out: 4513 [Urine:4200; Emesis/NG output:230; Drains:83]    # Pain Assessment:  Current Pain Score 1/18/2020   Patient currently in pain? -   Pain score (0-10) 8   Pain location -   Pain descriptors -   CPOT pain score -   - Bhavani is experiencing pain due to abdominal surgery. Pain management was discussed and the plan was created in a collaborative fashion.  Bhavani's response to the current recommendations: engaged  - Please see the plan for pain management as documented above      Constitutional: Awake, alert, cooperative, no apparent distress  Respiratory: Clear to auscultation bilaterally, no crackles or wheezing  Cardiovascular: Regular rate and rhythm, normal S1 and S2, and no murmur noted  GI: Normal bowel sounds, soft, non-distended but obese, and mild diffuse tenderness   Extrem: No calf tenderness, no ankle edema  Neuro: Ox3, no focal motor or sensory deficits but generalized weakness     Medications     dextrose       - " MEDICATION INSTRUCTIONS -       - MEDICATION INSTRUCTIONS -       - MEDICATION INSTRUCTIONS -       - MEDICATION INSTRUCTIONS -       ACE/ARB/ARNI NOT PRESCRIBED       BETA BLOCKER NOT PRESCRIBED       STATIN NOT PRESCRIBED         albuterol  2.5 mg Nebulization Q4H While awake     amLODIPine  5 mg Oral or Feeding Tube Daily     enoxaparin ANTICOAGULANT  40 mg Subcutaneous Q24H     furosemide  40 mg Oral Daily     gabapentin  800 mg Oral TID     insulin glargine  24 Units Subcutaneous QAM AC     levothyroxine  88 mcg Oral or Feeding Tube QAM AC     methadone  10 mg Oral or Feeding Tube Q24H     methadone  20 mg Oral or Feeding Tube Q12H KENIA     pantoprazole  40 mg Oral or Feeding Tube BID AC     potassium chloride  20 mEq Oral Daily     pravastatin  20 mg Oral Daily     sodium chloride (PF)  10 mL Intracatheter Q8H       Data   Recent Labs   Lab 01/19/20  0535 01/18/20  0500 01/17/20  0620  01/16/20  0600  01/13/20  0545   WBC  --  11.0 11.4*  --  15.1*   < > 16.7*   HGB  --  10.0* 9.0*  --  9.5*   < > 9.1*   MCV  --  91 91  --  90   < > 91   PLT  --  271 224  --  229   < > 194    135 138  --  141   < > 139   POTASSIUM 3.9 3.7 3.7   < > 3.3*   < > 3.7   CHLORIDE 96 97 99  --  99   < > 103   CO2 37* 35* 37*  --  39*   < > 32   BUN 28 26 26  --  28   < > 35*   CR 0.77 0.78 0.81  --  0.73   < > 0.85   ANIONGAP 3 3 2*  --  3   < > 4   DORY 9.3 9.3 9.1  --  9.4   < > 9.3   * 178* 147*  --  140*   < > 127*   ALBUMIN  --   --   --   --   --   --  1.3*   PROTTOTAL  --   --   --   --   --   --  4.6*   BILITOTAL  --   --   --   --   --   --  0.4   ALKPHOS  --   --   --   --   --   --  82   ALT  --   --   --   --   --   --  36   AST  --   --   --   --   --   --  28   TROPI  --  0.037  --   --   --   --   --     < > = values in this interval not displayed.       Imaging:   No results found for this or any previous visit (from the past 24 hour(s)).

## 2020-01-20 ENCOUNTER — APPOINTMENT (OUTPATIENT)
Dept: PHYSICAL THERAPY | Facility: CLINIC | Age: 65
DRG: 853 | End: 2020-01-20
Payer: MEDICARE

## 2020-01-20 ENCOUNTER — APPOINTMENT (OUTPATIENT)
Dept: OCCUPATIONAL THERAPY | Facility: CLINIC | Age: 65
DRG: 853 | End: 2020-01-20
Payer: MEDICARE

## 2020-01-20 LAB
GLUCOSE BLDC GLUCOMTR-MCNC: 111 MG/DL (ref 70–99)
GLUCOSE BLDC GLUCOMTR-MCNC: 170 MG/DL (ref 70–99)
GLUCOSE BLDC GLUCOMTR-MCNC: 188 MG/DL (ref 70–99)
GLUCOSE BLDC GLUCOMTR-MCNC: 197 MG/DL (ref 70–99)

## 2020-01-20 PROCEDURE — 97110 THERAPEUTIC EXERCISES: CPT | Mod: GP

## 2020-01-20 PROCEDURE — 97530 THERAPEUTIC ACTIVITIES: CPT | Mod: GP

## 2020-01-20 PROCEDURE — 97110 THERAPEUTIC EXERCISES: CPT | Mod: GO

## 2020-01-20 PROCEDURE — 25000132 ZZH RX MED GY IP 250 OP 250 PS 637: Mod: GY | Performed by: INTERNAL MEDICINE

## 2020-01-20 PROCEDURE — 00000146 ZZHCL STATISTIC GLUCOSE BY METER IP

## 2020-01-20 PROCEDURE — 25000131 ZZH RX MED GY IP 250 OP 636 PS 637: Mod: GY | Performed by: INTERNAL MEDICINE

## 2020-01-20 PROCEDURE — 25000128 H RX IP 250 OP 636: Performed by: INTERNAL MEDICINE

## 2020-01-20 PROCEDURE — 12000000 ZZH R&B MED SURG/OB

## 2020-01-20 PROCEDURE — 94640 AIRWAY INHALATION TREATMENT: CPT | Mod: 76

## 2020-01-20 PROCEDURE — 99232 SBSQ HOSP IP/OBS MODERATE 35: CPT | Performed by: INTERNAL MEDICINE

## 2020-01-20 PROCEDURE — 40000275 ZZH STATISTIC RCP TIME EA 10 MIN

## 2020-01-20 PROCEDURE — 25000132 ZZH RX MED GY IP 250 OP 250 PS 637: Mod: GY

## 2020-01-20 PROCEDURE — 40000239 ZZH STATISTIC VAT ROUNDS

## 2020-01-20 PROCEDURE — 27210437 ZZH NUTRITION PRODUCT SEMIELEM INTERMED LITER

## 2020-01-20 PROCEDURE — 94640 AIRWAY INHALATION TREATMENT: CPT

## 2020-01-20 PROCEDURE — 25000125 ZZHC RX 250: Performed by: INTERNAL MEDICINE

## 2020-01-20 PROCEDURE — 40000257 ZZH STATISTIC CONSULT NO CHARGE VASC ACCESS

## 2020-01-20 RX ORDER — POLYETHYLENE GLYCOL 3350 17 G/17G
17 POWDER, FOR SOLUTION ORAL 2 TIMES DAILY PRN
Status: DISCONTINUED | OUTPATIENT
Start: 2020-01-20 | End: 2020-01-21 | Stop reason: HOSPADM

## 2020-01-20 RX ORDER — DOCUSATE SODIUM 100 MG/1
200 CAPSULE, LIQUID FILLED ORAL 2 TIMES DAILY
Status: DISCONTINUED | OUTPATIENT
Start: 2020-01-20 | End: 2020-01-21 | Stop reason: HOSPADM

## 2020-01-20 RX ADMIN — TRAZODONE HYDROCHLORIDE 100 MG: 100 TABLET ORAL at 21:40

## 2020-01-20 RX ADMIN — GABAPENTIN 800 MG: 400 CAPSULE ORAL at 16:39

## 2020-01-20 RX ADMIN — METHADONE HYDROCHLORIDE 20 MG: 10 TABLET ORAL at 21:40

## 2020-01-20 RX ADMIN — ENOXAPARIN SODIUM 40 MG: 40 INJECTION SUBCUTANEOUS at 16:39

## 2020-01-20 RX ADMIN — OXYCODONE HYDROCHLORIDE 5 MG: 5 TABLET ORAL at 09:36

## 2020-01-20 RX ADMIN — POTASSIUM CHLORIDE 20 MEQ: 1.5 POWDER, FOR SOLUTION ORAL at 09:17

## 2020-01-20 RX ADMIN — ALBUTEROL SULFATE 2.5 MG: 2.5 SOLUTION RESPIRATORY (INHALATION) at 07:18

## 2020-01-20 RX ADMIN — ALBUTEROL SULFATE 2.5 MG: 2.5 SOLUTION RESPIRATORY (INHALATION) at 18:21

## 2020-01-20 RX ADMIN — AMLODIPINE BESYLATE 5 MG: 5 TABLET ORAL at 09:18

## 2020-01-20 RX ADMIN — POLYETHYLENE GLYCOL 3350 17 G: 17 POWDER, FOR SOLUTION ORAL at 18:06

## 2020-01-20 RX ADMIN — DOCUSATE SODIUM 100 MG: 100 CAPSULE, LIQUID FILLED ORAL at 09:18

## 2020-01-20 RX ADMIN — OXYCODONE HYDROCHLORIDE 5 MG: 5 TABLET ORAL at 22:18

## 2020-01-20 RX ADMIN — GABAPENTIN 800 MG: 400 CAPSULE ORAL at 09:18

## 2020-01-20 RX ADMIN — INSULIN GLARGINE 24 UNITS: 100 INJECTION, SOLUTION SUBCUTANEOUS at 09:17

## 2020-01-20 RX ADMIN — Medication 40 MG: at 16:39

## 2020-01-20 RX ADMIN — GABAPENTIN 800 MG: 400 CAPSULE ORAL at 21:41

## 2020-01-20 RX ADMIN — ALBUTEROL SULFATE 2.5 MG: 2.5 SOLUTION RESPIRATORY (INHALATION) at 15:20

## 2020-01-20 RX ADMIN — DOCUSATE SODIUM 200 MG: 100 CAPSULE, LIQUID FILLED ORAL at 21:41

## 2020-01-20 RX ADMIN — METHADONE HYDROCHLORIDE 10 MG: 10 TABLET ORAL at 13:30

## 2020-01-20 RX ADMIN — FUROSEMIDE 40 MG: 40 TABLET ORAL at 09:18

## 2020-01-20 RX ADMIN — DOCUSATE SODIUM 200 MG: 100 CAPSULE, LIQUID FILLED ORAL at 18:06

## 2020-01-20 RX ADMIN — ALBUTEROL SULFATE 2.5 MG: 2.5 SOLUTION RESPIRATORY (INHALATION) at 11:54

## 2020-01-20 RX ADMIN — OXYCODONE HYDROCHLORIDE 5 MG: 5 TABLET ORAL at 16:39

## 2020-01-20 RX ADMIN — METHADONE HYDROCHLORIDE 20 MG: 10 TABLET ORAL at 09:18

## 2020-01-20 RX ADMIN — PRAVASTATIN SODIUM 20 MG: 20 TABLET ORAL at 09:18

## 2020-01-20 RX ADMIN — LEVOTHYROXINE SODIUM 88 MCG: 88 TABLET ORAL at 09:18

## 2020-01-20 ASSESSMENT — ACTIVITIES OF DAILY LIVING (ADL)
ADLS_ACUITY_SCORE: 22

## 2020-01-20 NOTE — PROGRESS NOTES
[facility]    Hospitalist Progress Note    Assessment & Plan   64 year old female who was admitted on 12/29/2019 with acute abdomin with surgery on 1/1/20 showing perforated ulcer, and recovery complicated by DM type 2, Chronic pain with Fibromyalgia on Methadone, HTN, neurogenic bladder with suprapubic catheter and chronic weakness -- ambulates with a electric scooter:      Impression:   Principal Problem:    Perforated duodenal ulcer -- S/P Surgery 1/1/2020    Septic shock (H)    Acute blood loss anemia -- improved     Acute Toxic encephalopathy -- resolved     ROLANDO (acute kidney injury) -- better         Acute respiratory failure with hypoxia (H)      ANIYA -- noncompliant with CPAP      NSTEMI (non-ST elevated myocardial infarction)    -- suspect demand ischemia       Severe Hypaalbuminemia -- Albumin 1.3   -- this is contributing to 3rd spacing of fluid   -- continue lasix to 40 mg daily, and encourage good nutrition (tolerating tube feedings at 60 ml/hour, but only taking clear liquids PO)       Bilateral Pleural effusions -- related to hypoalbuminemia, breathing improved    -- improved with lasix     Active Problems:    DM type 2, w Neuropathy -- Hgb A1C 7.1 on 1/7/20      Fibromyalgia, Chronic Pain -- On Methadone      Hypertension goal BP (blood pressure) < 140/90      Neurogenic bladder, S/P Suprapubic Catheter       Morbid obesity -- BMI 57.8      NSTEMI (non-ST elevated myocardial infarction)       ANIYA -- noncompliant with CPAP   -- needs BIPAP at bedtime, now ordered      Plan:  Continue Postop care -- discussed with patient, she is agreeable to TCU in [facility] yet.  Spoke with  -- he is unset because he can't get a ride there to visit her (he may have dementia -- explained his wife is getting better which reassured him).    Spoke with  -- still waiting for a TCU that can take her.     DVT Prophylaxis: Pneumatic Compression Devices  Code Status: Full  Code    Disposition: Expected discharge to TCU tomorrow ([facility]).      Khurram Moe MD  Pager [phone]  Cell Phone [phone]  Text Page (7am to 6pm)  Interval History   Feels OK, still on 2 lpm O2.  Tolerating tube feedings but no BM for 3 days.   Physical Exam   Temp: 99.3  F (37.4  C) Temp src: Axillary BP: 136/62 Pulse: 75 Heart Rate: 84 Resp: 16 SpO2: 96 % O2 Device: Nasal cannula Oxygen Delivery: 2 LPM  Vitals:    01/14/20 0644 01/16/20 0500 01/19/20 0605   Weight: (!) 168.7 kg (371 lb 14.7 oz) (!) 155 kg (341 lb 11.4 oz) 92.9 kg (204 lb 12.9 oz)     Vital Signs with Ranges  Temp:  [98.2  F (36.8  C)-99.9  F (37.7  C)] 99.3  F (37.4  C)  Pulse:  [75] 75  Heart Rate:  [77-84] 84  Resp:  [16] 16  BP: (102-136)/(48-63) 136/62  SpO2:  [89 %-97 %] 96 %  I/O last 3 completed shifts:  In: 4700 [P.O.:780; I.V.:10; NG/GT:2085]  Out: 3125 [Urine:2850; Emesis/NG output:125; Drains:150]    # Pain Assessment:  Current Pain Score 1/20/2020   Patient currently in pain? yes   Pain score (0-10) 6   Pain location -   Pain descriptors -   CPOT pain score -   - Bhavani is experiencing pain due to abdominal surgery. Pain management was discussed and the plan was created in a collaborative fashion.  Bhavani's response to the current recommendations: engaged  - Please see the plan for pain management as documented above      Constitutional: Awake, alert, cooperative, no apparent distress  Respiratory: Clear to auscultation bilaterally, no crackles or wheezing  Cardiovascular: Regular rate and rhythm, normal S1 and S2, and no murmur noted  GI: Normal bowel sounds, soft, non-distended but obese, and mild diffuse tenderness   Extrem: No calf tenderness, no ankle edema  Neuro: Ox3, no focal motor or sensory deficits but generalized weakness     Medications     dextrose       - MEDICATION INSTRUCTIONS -       - MEDICATION INSTRUCTIONS -       - MEDICATION INSTRUCTIONS -       - MEDICATION INSTRUCTIONS -        ACE/ARB/ARNI NOT PRESCRIBED       BETA BLOCKER NOT PRESCRIBED       STATIN NOT PRESCRIBED         albuterol  2.5 mg Nebulization Q4H While awake     amLODIPine  5 mg Oral or Feeding Tube Daily     docusate sodium  100 mg Oral BID     enoxaparin ANTICOAGULANT  40 mg Subcutaneous Q24H     furosemide  40 mg Oral Daily     gabapentin  800 mg Oral TID     insulin glargine  24 Units Subcutaneous QAM AC     levothyroxine  88 mcg Oral or Feeding Tube QAM AC     methadone  10 mg Oral or Feeding Tube Q24H     methadone  20 mg Oral or Feeding Tube Q12H KENIA     pantoprazole  40 mg Oral or Feeding Tube BID AC     potassium chloride  20 mEq Oral Daily     pravastatin  20 mg Oral Daily     sodium chloride (PF)  10 mL Intracatheter Q8H     traZODone  100 mg Oral At Bedtime       Data   Recent Labs   Lab 01/19/20  0535 01/18/20  0500 01/17/20  0620  01/16/20  0600   WBC  --  11.0 11.4*  --  15.1*   HGB  --  10.0* 9.0*  --  9.5*   MCV  --  91 91  --  90   PLT  --  271 224  --  229    135 138  --  141   POTASSIUM 3.9 3.7 3.7   < > 3.3*   CHLORIDE 96 97 99  --  99   CO2 37* 35* 37*  --  39*   BUN 28 26 26  --  28   CR 0.77 0.78 0.81  --  0.73   ANIONGAP 3 3 2*  --  3   DORY 9.3 9.3 9.1  --  9.4   * 178* 147*  --  140*   TROPI  --  0.037  --   --   --     < > = values in this interval not displayed.       Imaging:   No results found for this or any previous visit (from the past 24 hour(s)).

## 2020-01-20 NOTE — PROGRESS NOTES
"CLINICAL NUTRITION SERVICES - REASSESSMENT NOTE      Recommendations Ordered by Registered Dietitian (RD):     Modified supplement order - will send a 4oz peach Breeze TID with meals     Malnutrition: (1/12)  % Weight Loss:  None noted  % Intake:  No decreased intake noted (has been at goal TF past 5 days)  Subcutaneous Fat Loss:  None observed  Muscle Loss:  None observed  Fluid Retention:  None noted     Malnutrition Diagnosis: Patient does not meet two of the above criteria necessary for diagnosing malnutrition        EVALUATION OF PROGRESS TOWARD GOALS   Diet:    Clear liquids  Boost Breeze at 10am and 2pm    Nutrition Support:    Type of Feeding Tube: J-tube  Enteral Frequency:  Continuous  Enteral Regimen: Peptamen Intense VHP at 60 mL/hr  Total Enteral Provisions: 1440 kcal (12 kcal/kg), 132 g protein (2.5 g/kg), 1210 mL H2O, 7 g fiber, 109 g CHO, 96% RDI  Free Water Flush: 100 mL every 4 hours (Per MD)     Intake/Tolerance:    TF infusing at goal rate  No BM noted (colace ordered 1/19) - \"Chronic constipation -- probably related to Methadone\"  1/19: Na 136           K 3.9    Visited with pt this morning - eating ice chips  Pt has not been ordering meals (last ordered 1/17 breakfast)  She did order some jello and fruit ice this morning  Thinks the Breeze supplement is \"ok\" but would like to try the peach flavor        ASSESSED NUTRITION NEEDS:  Dosing Weight 122 kg (12/31, lowest wt - energy) and 54.5 kg (protein)  Estimated Energy Needs: 9256-9356 kcals (11-14 Kcal/Kg)  Justification: obese  Estimated Protein Needs: 110-135 grams protein (2-2.5 g pro/Kg)  Justification: hypercatabolism with acute illness and obesity guidelines       NEW FINDINGS:   Plan TCU at discharge    Vitals:    12/29/19 1428 12/30/19 0345 12/31/19 0235 01/01/20 0400   Weight: 140 kg (308 lb 10.3 oz) 138.8 kg (306 lb) 122 kg (268 lb 15.4 oz) 124 kg (273 lb 5.9 oz)    01/02/20 0400 01/03/20 0415 01/04/20 0000 01/05/20 0000   Weight: 127 " kg (279 lb 15.8 oz) 130 kg (286 lb 9.6 oz) 131 kg (288 lb 12.8 oz) 135 kg (297 lb 9.9 oz)    01/07/20 0034 01/08/20 0400 01/12/20 0636 01/14/20 0644   Weight: (!) 167 kg (368 lb 2.7 oz) (!) 168 kg (370 lb 6 oz) (!) 170 kg (374 lb 12.5 oz) (!) 168.7 kg (371 lb 14.7 oz)    01/16/20 0500 01/19/20 0605   Weight: (!) 155 kg (341 lb 11.4 oz) 92.9 kg (204 lb 12.9 oz)         Previous Goals (1/16):   TF will continue to meet % needs  Evaluation: Met  Patient will have a BM once every 24-48 hours   Evaluation: Not met (colace ordered 1/19)    Previous Nutrition Diagnosis (1/16):   Altered GI function related to pain meds, inactivity as evidenced by no BM x 5 days   Evaluation: No change, modified below      CURRENT NUTRITION DIAGNOSIS  No nutrition diagnosis identified at this time       INTERVENTIONS  Recommendations / Nutrition Prescription  Clear liquids  Nutrition supplement   TF as above    Implementation  Modified supplement order - will send a 4oz peach Breeze TID with meals    Goals  EN to meet % est needs      MONITORING AND EVALUATION:  Progress towards goals will be monitored and evaluated per protocol and Practice Guidelines

## 2020-01-20 NOTE — PROGRESS NOTES
Surgery    No changes.  Tolerating tube feeds and having bowel movements.  No pain today.      Abdomen-soft without tenderness. Drains and tubes in appropriate position with appropriate output.    A/P  Awaiting TCU bed. Follow up in surgery clinic in 2-3 weeks or sooner if any issues arise.    Ayan Lopez M.D.  [facility]  [phone]

## 2020-01-20 NOTE — PLAN OF CARE
Neuro: Aox4. Anxious.     Respiratory: Diminished LS. Refused BiPAP for HS; placed on 2L NC. RA when awake.     Cardiac: WNL.     GI/: Bowel sounds hypoactive x4 quads. BM 01/17. Adequate urine OP via suprapubic cath.     Skin: Pale, dry. Incisions and drains intact; sites WNL.     Pain: Attributed to back, hip. Repositioned and meds; refer to FS, MAR.     Mobility: Bed-fast; reposition q2 hours. Ax2, ceiling lift.     Diet: CL, tube feeding.     IVF: -     Plan of Care: Ambulation, pain control. Transition to TCU in [place].

## 2020-01-20 NOTE — PLAN OF CARE
Discharge Planner PT   Patient plan for discharge: rehab  Current status:     Engaged in supine exercises. Pt required max A to roll R and L to dick sling for transfer to chair. Continues to have pain in bottom. Dependent transfer to chair. Pt demonstrates poor core strength sitting in chair, unable to position self well without assist. Pillows placed for midline positiioning, BLE elevated.   Barriers to return to prior living situation: Lift, impaired balance, impaired strength   Recommendations for discharge: TCU  Rationale for recommendations: TCu to improve IND with bed mobility, transfers, sitting balance as below baseline at this time          Entered by: Mehnaz Early 01/20/2020 5:01 PM

## 2020-01-20 NOTE — PLAN OF CARE
Discharge Planner OT   Patient plan for discharge: TCU  Current status: Pt provided w/ yellow t-band BUE HEP to improve strength and act for ADLs/transfers. 4 sets x 10 reps, pt required increased verbal and tactile cueing for correct technique.   Barriers to return to prior living situation: level of A w/ all mobility and ADL's  Recommendations for discharge: TCU  Rationale for recommendations: Pt will require continued therapy to improve safety/ind w/ ADL's/mobility prior to returning home.        Entered by: Bridget Guzman 01/20/2020 4:31 PM

## 2020-01-20 NOTE — PROGRESS NOTES
"General Surgery  - agree with below    Chika Ramirez PA-C    General Surgery--PA Student    Alert, oriented, appropriately responsive. Denied pain. Just wants to \"go home\" but will go to TCU.  Has been sleeping through night without BiPAP but on nasal canula.  Moving arms and legs independently without prompting.    /56 (BP Location: Left arm)   Pulse 75   Temp 99.9  F (37.7  C) (Oral)   Resp 16   Wt 92.9 kg (204 lb 12.9 oz)   SpO2 95%   BMI 34.61 kg/m      Exam:  Resp: non-labored, mildly shallow, clear throughout  Heart: RRR, no M/R/G or rubs  Abdomen: soft, non-distended, non-tender. Midline incision open to air, clean, dry. All drains in place without erythema, clean and dry dressings. See I/O below.  Extremities: Moving arms and legs independently bilateral. Full  strength bilateral. No LE edema.    Labs:  WBC 11.0 normalized 1/18/2020  HGB increase to 10.0 1/18/2020    I/O 1/19/2020 0700 -- 1/202/2020 0700:  T infusing at goal rate  G tube output decreased 230mL to 125mL, nothing in tube currently  Left THAI output decreased 3mL to 0mL, nothing in tube currently  Malecot out increased 80mL to 150mL, nothing in tube currently    Assessment/Plan:  Perforated duodenal ulcer with hemorrhagic shock and bowel ischemia, s/p ext lap, TRUONG, Malecot drain placement with perforation, and wound VAC on 12/29/2019. Repeat ex-lap, TRUONG, closure of duodenal perforation over Malecot, G-tube/J-tube placements, delayed primary closure on 1/1/2020.                 - Continue clear liquids, tube feedings              - Continue monitor drains              - Remove staples prior to discharge              - Discharge to TCU when available and cleared by hospitalist--possibly today per chart review   - Follow-up with Dr. Lopez after CT scan in 3 weeks     MYRON Dubon-S  "

## 2020-01-20 NOTE — PROGRESS NOTES
HCANELLE  I: CHANELLE was updated that patient was agreeable to TCU in [facility]. CHANELLE sent referral to [facility] and [facility]. Both facilities declined. CHANELLE sent the referral to [facility], [facility]. CHANELLE awaits a c/b from assessing facilities.     ADDENDUM  I: [facility] cannot accept patient due to no bed availability.     P: CHANELLE will continue to follow and assist as needed.    SARAH Hernandez, LGSW  [phone]

## 2020-01-21 VITALS
OXYGEN SATURATION: 95 % | DIASTOLIC BLOOD PRESSURE: 70 MMHG | RESPIRATION RATE: 18 BRPM | TEMPERATURE: 98.8 F | SYSTOLIC BLOOD PRESSURE: 115 MMHG | BODY MASS INDEX: 34.65 KG/M2 | WEIGHT: 205.03 LBS | HEART RATE: 77 BPM

## 2020-01-21 LAB
ALBUMIN SERPL-MCNC: 1.7 G/DL (ref 3.4–5)
ANION GAP SERPL CALCULATED.3IONS-SCNC: 1 MMOL/L (ref 3–14)
BUN SERPL-MCNC: 25 MG/DL (ref 7–30)
CALCIUM SERPL-MCNC: 9.8 MG/DL (ref 8.5–10.1)
CHLORIDE SERPL-SCNC: 96 MMOL/L (ref 94–109)
CO2 SERPL-SCNC: 40 MMOL/L (ref 20–32)
CREAT SERPL-MCNC: 0.77 MG/DL (ref 0.52–1.04)
ERYTHROCYTE [DISTWIDTH] IN BLOOD BY AUTOMATED COUNT: 14.8 % (ref 10–15)
GFR SERPL CREATININE-BSD FRML MDRD: 82 ML/MIN/{1.73_M2}
GLUCOSE SERPL-MCNC: 175 MG/DL (ref 70–99)
HCT VFR BLD AUTO: 29.3 % (ref 35–47)
HGB BLD-MCNC: 9 G/DL (ref 11.7–15.7)
MCH RBC QN AUTO: 27.7 PG (ref 26.5–33)
MCHC RBC AUTO-ENTMCNC: 30.7 G/DL (ref 31.5–36.5)
MCV RBC AUTO: 90 FL (ref 78–100)
PLATELET # BLD AUTO: 246 10E9/L (ref 150–450)
POTASSIUM SERPL-SCNC: 3.6 MMOL/L (ref 3.4–5.3)
RBC # BLD AUTO: 3.25 10E12/L (ref 3.8–5.2)
SODIUM SERPL-SCNC: 137 MMOL/L (ref 133–144)
WBC # BLD AUTO: 15 10E9/L (ref 4–11)

## 2020-01-21 PROCEDURE — 94640 AIRWAY INHALATION TREATMENT: CPT

## 2020-01-21 PROCEDURE — 80048 BASIC METABOLIC PNL TOTAL CA: CPT | Performed by: INTERNAL MEDICINE

## 2020-01-21 PROCEDURE — 82040 ASSAY OF SERUM ALBUMIN: CPT | Performed by: INTERNAL MEDICINE

## 2020-01-21 PROCEDURE — 25000132 ZZH RX MED GY IP 250 OP 250 PS 637: Mod: GY | Performed by: INTERNAL MEDICINE

## 2020-01-21 PROCEDURE — 25000131 ZZH RX MED GY IP 250 OP 636 PS 637: Mod: GY | Performed by: INTERNAL MEDICINE

## 2020-01-21 PROCEDURE — 94640 AIRWAY INHALATION TREATMENT: CPT | Mod: 76

## 2020-01-21 PROCEDURE — 85027 COMPLETE CBC AUTOMATED: CPT | Performed by: INTERNAL MEDICINE

## 2020-01-21 PROCEDURE — 40000257 ZZH STATISTIC CONSULT NO CHARGE VASC ACCESS

## 2020-01-21 PROCEDURE — 25000132 ZZH RX MED GY IP 250 OP 250 PS 637: Mod: GY

## 2020-01-21 PROCEDURE — 40000275 ZZH STATISTIC RCP TIME EA 10 MIN

## 2020-01-21 PROCEDURE — 25000125 ZZHC RX 250: Performed by: INTERNAL MEDICINE

## 2020-01-21 PROCEDURE — 99207 ZZC CDG-CHARGE REQUIRED MANUAL ENTRY: CPT | Performed by: INTERNAL MEDICINE

## 2020-01-21 PROCEDURE — 99239 HOSP IP/OBS DSCHRG MGMT >30: CPT | Performed by: INTERNAL MEDICINE

## 2020-01-21 RX ORDER — POTASSIUM CHLORIDE 1500 MG/1
20 TABLET, EXTENDED RELEASE ORAL DAILY
Status: DISCONTINUED | OUTPATIENT
Start: 2020-01-22 | End: 2020-01-21 | Stop reason: HOSPADM

## 2020-01-21 RX ORDER — BISACODYL 10 MG
10 SUPPOSITORY, RECTAL RECTAL DAILY PRN
Status: DISCONTINUED | OUTPATIENT
Start: 2020-01-21 | End: 2020-01-21 | Stop reason: HOSPADM

## 2020-01-21 RX ORDER — BISACODYL 10 MG
10 SUPPOSITORY, RECTAL RECTAL DAILY PRN
Refills: 0
Start: 2020-01-21 | End: 2020-03-13

## 2020-01-21 RX ORDER — POTASSIUM CHLORIDE 1500 MG/1
20 TABLET, EXTENDED RELEASE ORAL DAILY
Refills: 0
Start: 2020-01-22 | End: 2020-04-16

## 2020-01-21 RX ORDER — METHADONE HYDROCHLORIDE 10 MG/1
20 TABLET ORAL 2 TIMES DAILY
Qty: 20 TABLET | Refills: 0 | Status: ON HOLD | OUTPATIENT
Start: 2020-01-21 | End: 2020-02-12

## 2020-01-21 RX ORDER — FUROSEMIDE 40 MG
40 TABLET ORAL DAILY
Refills: 0
Start: 2020-01-22 | End: 2020-04-22

## 2020-01-21 RX ORDER — PANTOPRAZOLE SODIUM 40 MG/1
40 TABLET, DELAYED RELEASE ORAL
Status: DISCONTINUED | OUTPATIENT
Start: 2020-01-21 | End: 2020-01-21 | Stop reason: HOSPADM

## 2020-01-21 RX ORDER — OXYCODONE HYDROCHLORIDE 5 MG/1
5 TABLET ORAL EVERY 6 HOURS PRN
Qty: 10 TABLET | Refills: 0 | Status: ON HOLD | OUTPATIENT
Start: 2020-01-21 | End: 2020-02-12

## 2020-01-21 RX ORDER — POLYETHYLENE GLYCOL 3350 17 G/17G
34 POWDER, FOR SOLUTION ORAL 2 TIMES DAILY
Status: DISCONTINUED | OUTPATIENT
Start: 2020-01-21 | End: 2020-01-21 | Stop reason: HOSPADM

## 2020-01-21 RX ORDER — PANTOPRAZOLE SODIUM 40 MG/1
40 TABLET, DELAYED RELEASE ORAL
Refills: 0 | Status: ON HOLD
Start: 2020-01-21 | End: 2020-02-12

## 2020-01-21 RX ORDER — POLYETHYLENE GLYCOL 3350 17 G/17G
17 POWDER, FOR SOLUTION ORAL 2 TIMES DAILY PRN
Refills: 0
Start: 2020-01-21 | End: 2023-01-01

## 2020-01-21 RX ORDER — AMLODIPINE BESYLATE 5 MG/1
5 TABLET ORAL DAILY
Refills: 0
Start: 2020-01-22 | End: 2020-04-16

## 2020-01-21 RX ORDER — DOCUSATE SODIUM 100 MG/1
200 CAPSULE, LIQUID FILLED ORAL 2 TIMES DAILY
Refills: 0
Start: 2020-01-21 | End: 2020-07-14 | Stop reason: ALTCHOICE

## 2020-01-21 RX ADMIN — POLYETHYLENE GLYCOL 3350 34 G: 17 POWDER, FOR SOLUTION ORAL at 12:04

## 2020-01-21 RX ADMIN — ALBUTEROL SULFATE 2.5 MG: 2.5 SOLUTION RESPIRATORY (INHALATION) at 07:23

## 2020-01-21 RX ADMIN — ALBUTEROL SULFATE 2.5 MG: 2.5 SOLUTION RESPIRATORY (INHALATION) at 14:18

## 2020-01-21 RX ADMIN — Medication 40 MG: at 06:53

## 2020-01-21 RX ADMIN — AMLODIPINE BESYLATE 5 MG: 5 TABLET ORAL at 09:35

## 2020-01-21 RX ADMIN — INSULIN GLARGINE 24 UNITS: 100 INJECTION, SOLUTION SUBCUTANEOUS at 09:38

## 2020-01-21 RX ADMIN — FUROSEMIDE 40 MG: 40 TABLET ORAL at 09:35

## 2020-01-21 RX ADMIN — DOCUSATE SODIUM 200 MG: 100 CAPSULE, LIQUID FILLED ORAL at 09:34

## 2020-01-21 RX ADMIN — ALBUTEROL SULFATE 2.5 MG: 2.5 SOLUTION RESPIRATORY (INHALATION) at 10:43

## 2020-01-21 RX ADMIN — GABAPENTIN 800 MG: 400 CAPSULE ORAL at 09:34

## 2020-01-21 RX ADMIN — OXYCODONE HYDROCHLORIDE 5 MG: 5 TABLET ORAL at 04:20

## 2020-01-21 RX ADMIN — METHADONE HYDROCHLORIDE 20 MG: 10 TABLET ORAL at 09:35

## 2020-01-21 RX ADMIN — LEVALBUTEROL HYDROCHLORIDE 1.25 MG: 1.25 SOLUTION, CONCENTRATE RESPIRATORY (INHALATION) at 03:54

## 2020-01-21 RX ADMIN — LEVOTHYROXINE SODIUM 88 MCG: 88 TABLET ORAL at 06:53

## 2020-01-21 RX ADMIN — PRAVASTATIN SODIUM 20 MG: 20 TABLET ORAL at 09:34

## 2020-01-21 ASSESSMENT — ACTIVITIES OF DAILY LIVING (ADL)
ADLS_ACUITY_SCORE: 22

## 2020-01-21 NOTE — PLAN OF CARE
Speech Language Therapy Discharge Summary    Reason for therapy discharge:    Discharged to transitional care facility.    Progress towards therapy goal(s). See goals on Care Plan in [facility] electronic health record for goal details.  Goals met    Therapy recommendation(s):    Continued therapy is recommended.  Rationale/Recommendations:  Patient will benefit from SLP tx for safe diet advancement/swallow strategy training as needed.    Diet at discharge: Clear liquids per GI with 1:1 feeding assistance, however, defer to GI surgery team for diet advancement.  Recommend patient eat only when alert, up to chair whenever possible, small bites/sips.     Fiberoptic endoscopic evaluation of swallowing (FEES) was completed 1/14/20 which revealed with mild pharyngeal dysphagia characterized by premature pharyngeal entry of liquids to pyriform sinus level and puree to valleculae.  No aspiration was noted.  No significant pharyngeal residue was noted.

## 2020-01-21 NOTE — PROGRESS NOTES
Page out to Dr. Lopez regarding discharge for today. Awaiting call back.     [id number] call back from jerry RIVAS to discharge, orders had been placed from surgical team already.

## 2020-01-21 NOTE — PROGRESS NOTES
SW:    I: SW following for discharge planning. Pt has been accepted at [facility]. SW on 1/20/20 discussed with pt and she is agreeable to this, however is hopeful to be moved into a private when one becomes available. CHANELLE discussed discharge today with Emilie with admissions ([phone]) and the room is available any time. SW arranged bariatric stretcher due to weight and height through [facility]. PCS form completed and faxed to [facility]illing ([phone]). SW to fax discharge orders as soon as available.     SW also discussed pt care plan at length with pt brother-in-law, Sarmad, who is a strong advocate for both pt and her . Sarmad was given information on the [facility] and relieved to hear that she will be going to a facility and not directly home.     P: Pt to discharge to [facility] via [facility] stretcher transport at 1400.     PAS-RR    D: Per DHS regulation, SW completed and submitted PAS-RR to [place] [employer] Direct Connect via the Senior LinkAge Line.  PAS-RR confirmation # is : [id number]    I: SW spoke with pt and they are aware a PAS-RR has been submitted.  SW reviewed with pt that they may be contacted for a follow up appointment within 10 days of hospital discharge if their SNF stay is < 30 days.  Contact information for [facility] LinkAge Line was also provided.    A: Pt verbalized understanding.    P: Further questions may be directed to [facility] LinkAge Line at #[phone], option #4 for PAS-RR staff.      ADDENDUM: Orders and scripts faxed to facility admissions.    SARAH Gipson, LICSW  [facility]  Daytime (8:00am-4:30pm): [phone]  After-Hours SW Pager (4:30pm-11:30pm): [phone]

## 2020-01-21 NOTE — DISCHARGE SUMMARY
[facility]    Discharge Summary  Hospitalist    Date of Admission:  12/29/2019  Date of Discharge:  1/21/2020  Discharging Provider: Khurram Moe MD    Discharge Diagnoses   Principal Problem:    Perforated duodenal ulcer -- S/P Surgery 1/1/2020      Septic shock -- resolved      Acute respiratory failure with hypoxia -- better       NSTEMI (non-ST elevated myocardial infarction) -- related to demand ischemia, stable       Acute blood loss anemia -- 2nd to GI bleed, stable       Acute Toxic encephalopathy -- multifactorial, resolved      Non-Severe malnutrition    Active Problems:    DM type 2, w Neuropathy -- Hgb A1C 7.1 on 1/7/20      Fibromyalgia, Chronic Pain -- On Methadone      Hypertension goal BP (blood pressure) < 140/90      Neurogenic bladder, S/P Suprapubic Catheter       Morbid obesity -- BMI 57.8      ROLANDO (acute kidney injury) -- resolved      ANIYA -- noncompliant with CPAP      History of Present Illness   64 year old female with history of chronic pain on methadone, obesity, ANIYA, hypothyroidism, DM and prior abdominal surgery presented to ED with altered mental status with abdominal pain and emesis discovered to have perforated gastric ulcer on imaging.      Per EMS notes patient not feeling well with 3 day prodrome not taking meds (methadone) with new altered mental status this evening prompting her  to call to [phone].  Per EMS en route, patient vomited 'fecal material' with suspected aspiration. On arrival in ED, patient noted to be altered tachypneic, thrashing with cool, mottled skin and sats in 40% (?)  prompting immediate intubation for respiratory failure. Hemodynamics initially appropriate with MAP in 70s. EKG with some dynamic changes on initial tracing with new incomplete bundle branch block Labs notable for ED work notable for tropon ~20 , lactic acid 3 , ROLANDO 1.6 and acute hepatitis with LFTs in 700's.  WBC 20K, elevated Hgb 19. CT CAP demonstrated small  effusions patchy GGO in RML consistent with aspiration, fat stranding around the pylorus and anterior duodenum suspicious for perforation as small amount of extraluminal gas.      TLC subsequently placed, zosyn started and 3L NS bolused. Surgery contacted in ED with recommendations for conservative management, case also discussed with Cardiology    Hospital Course   Perforated duodenal ulcer with hemorrhagic shock and bowel ischemia, s/p ext lap, TRUONG, Malecot drain placement with perforation, and wound VAC on 12/29/2019. Repeat ex-lap, TRUONG, closure of duodenal perforation over Malecot, G-tube/J-tube placements, delayed primary closure on 1/1/2020.                 - Continue clear liquids, tube feedings per J-tube   - Has G-tube for PRN drainage (has not needed)              - Use resistance bands for strengthening per OT/PT recommendations                          - Follow-up with Dr. Lopez after CT scan in 3 weeks    Has ANIYA, needs to use CPAP at bedtime -- she has stopped using it a year ago, and has O2 desaturation down to 66% documented without it.  Will continue O2 at 2 LPM per nasal canula and wean as able keeping O2 sat >= 90%, and may need to continue at bedtime.      Has fluid third spacing from low albumin (1.7 at discharge), on lasix 40 mg daily to help with fluid retention, would check CBC, BMP, Mag, Phos and Albumin every Monday to monitor tube feedings.       Khurram Moe MD, MD  Pager: [phone]  Cell Phone:  [phone]       Significant Results and Procedures   As above    Pending Results   These results will be followed up by Dr. Moe  Unresulted Labs Ordered in the Past 30 Days of this Admission     No orders found from 11/29/2019 to 12/30/2019.          Code Status   Full Code       Primary Care Physician   [facility]    Physical Exam   Temp: 98.8  F (37.1  C) Temp src: Oral BP: 115/70 Pulse: 77 Heart Rate: 82 Resp: 18 SpO2: 95 % O2 Device: Nasal cannula  Oxygen Delivery: 2 LPM  Vitals:    01/16/20 0500 01/19/20 0605 01/21/20 0635   Weight: (!) 155 kg (341 lb 11.4 oz) 92.9 kg (204 lb 12.9 oz) 93 kg (205 lb 0.4 oz)     Vital Signs with Ranges  Temp:  [97.8  F (36.6  C)-99.5  F (37.5  C)] 98.8  F (37.1  C)  Pulse:  [75-87] 77  Heart Rate:  [74-88] 82  Resp:  [16-22] 18  BP: ()/(49-70) 115/70  SpO2:  [90 %-98 %] 95 %  I/O last 3 completed shifts:  In: 2249 [P.O.:840; NG/GT:525]  Out: 3775 [Urine:3275; Emesis/NG output:450; Drains:50]    Exam on discharge:   Lungs clear  CV with reg S1S2  Abdomin soft, morbidly obese, has G-tube, J-tube, and suprapubic catheter  Neuro -- alert, Ox3, generalized weakness    Discharge Disposition   Discharged to short-term care facility  Condition at discharge: Good    Consultations This Hospital Stay   CARDIOLOGY IP CONSULT  PHYSICAL THERAPY ADULT IP CONSULT  OCCUPATIONAL THERAPY ADULT IP CONSULT  NUTRITION SERVICES ADULT IP CONSULT  PHARMACY TO DOSE HEPARIN  PHARMACY TO DOSE VANCO  SOCIAL WORK IP CONSULT  NUTRITION SERVICES ADULT IP CONSULT  PHARMACY IP CONSULT  PHARMACY IP CONSULT  VASCULAR ACCESS ADULT IP CONSULT  SPEECH LANGUAGE PATH ADULT IP CONSULT  OCCUPATIONAL THERAPY ADULT IP CONSULT  RESPIRATORY CARE IP CONSULT  WOUND OSTOMY CONTINENCE NURSE  IP CONSULT  CARDIOLOGY IP CONSULT  CARE TRANSITION RN/SW IP CONSULT  GASTROENTEROLOGY IP CONSULT  WOUND OSTOMY CONTINENCE NURSE  IP CONSULT  OCCUPATIONAL THERAPY ADULT IP CONSULT  PLASTIC SURGERY IP CONSULT  CARE TRANSITION RN/SW IP CONSULT  PHYSICAL THERAPY ADULT IP CONSULT  OCCUPATIONAL THERAPY ADULT IP CONSULT  SMOKING CESSATION PROGRAM IP CONSULT    Time Spent on this Encounter   I spent a total of 35 minutes discharging this patient.     Discharge Orders      Discharge Order: F/U with Cardiac  FIONA      Activity    Your activity upon discharge: activity as tolerated     Wound care and dressings    Instructions to care for your wound at home: keep wound clean and dry.     Supplies     List the supplies the pt needs to go home: gauze and tape     Follow-up and recommended labs and tests     Follow up with Dr. Lopez in 1 month.  We will order your CT scan at your follow up appointment. Call [phone] to schedule an appointment.  We are located at [address] [place] [address]  [place] [address].     Tubes and drains    You are going home with the following tubes or drains:   Jejunostomy tube for tube feeds, G-Tube - to gravity drain for venting, Malecot to gravity drain and THAI.   Record outputs daily. Tube cares per hospital or home care instructions     General info for SNF    Length of Stay Estimate: Short Term Care: Estimated # of Days <30  Condition at Discharge: Improving  Level of care:skilled   Rehabilitation Potential: Good  Admission H&P remains valid and up-to-date: Yes  Recent Chemotherapy: N/A  Use Nursing Home Standing Orders: Yes     Mantoux instructions    Give two-step Mantoux (PPD) Per Facility Policy Yes     Reason for your hospital stay    Perforated duodenal ulcer with Septic Shock     Additional Discharge Instructions    Call Dr. Livingston if any medical questions at Cell Phone [phone].     Activity - Up with nursing assistance     Glucose monitor nursing POCT    Glucometer bid.     Daily weights    Weights twice a week.     Nguyen catheter    Suprapubic nguyen catheter to gravity drainage, change monthly and PRN.  G-tube available for gravity drainage prn nausea and vomiting (hasn't needed).  J-tube for tube feedings.     Full Code     Physical Therapy Adult Consult    Evaluate and treat as clinically indicated.    Reason:  Weakness     Occupational Therapy Adult Consult    Evaluate and treat as clinically indicated.    Reason:  Assist with ADL's     Oxygen - Nasal cannula    2 Lpm by nasal cannula to keep O2 sats 90% or greater, may wean as able.     BIPAP treatment     Pneumatic Compression Device     Bilateral calf. Remove 30 mins BID.     Diet    Follow this  diet upon discharge: clear liquids only     Advance Diet as Tolerated    Follow this diet upon discharge: Orders Placed This Encounter      Adult Formula Drip Feeding: Continuous Peptamen Intense VHP; Jejunostomy; Goal Rate: 60; mL/hr; Medication - Feeding Tube Flush Frequency: At least 15-30 mL water before and after medication administration and with tube clogging; Amount to Send (N...      Snacks/Supplements Adult: Other; Strawberry boost bid prn      Clear Liquid Diet      Diet     Discharge Medications   Current Discharge Medication List      START taking these medications    Details   amLODIPine (NORVASC) 5 MG tablet 1 tablet (5 mg) by Oral or Feeding Tube route daily  Refills: 0    Associated Diagnoses: Hypertension goal BP (blood pressure) < 140/90      bisacodyl (DULCOLAX) 10 MG suppository Place 1 suppository (10 mg) rectally daily as needed for constipation  Refills: 0    Associated Diagnoses: Drug-induced constipation      docusate sodium (COLACE) 100 MG capsule Take 2 capsules (200 mg) by mouth 2 times daily  Refills: 0    Associated Diagnoses: Drug-induced constipation      furosemide (LASIX) 40 MG tablet Take 1 tablet (40 mg) by mouth daily  Refills: 0    Associated Diagnoses: Hypertension goal BP (blood pressure) < 140/90      hypromellose-dextran (ARTIFICAL TEARS) 0.1-0.3 % ophthalmic solution Place 1 drop into both eyes 3 times daily as needed for dry eyes  Refills: 0    Associated Diagnoses: Fibromyalgia      insulin aspart (NOVOLOG PEN) 100 UNIT/ML pen Inject 0-6 Units Subcutaneous 2 times daily Twice a day, for glucometer 150-200 give 2 units, 201-250 4 units, >250 6 units.  Refills: 0    Associated Diagnoses: Type 2 diabetes mellitus with diabetic nephropathy, unspecified whether long term insulin use (H)      insulin glargine (LANTUS PEN) 100 UNIT/ML pen Inject 27 Units Subcutaneous every morning (before breakfast)  Refills: 0    Comments: If Lantus is not covered by insurance, may substitute  Basaglar at same dose and frequency.    Associated Diagnoses: Type 2 diabetes mellitus with diabetic nephropathy, unspecified whether long term insulin use (H)      miconazole (MICATIN/MICRO GUARD) 2 % external powder Apply topically 2 times daily as needed for other (topical candidiasis)  Refills: 0    Associated Diagnoses: Morbid obesity, unspecified obesity type (H)      oxyCODONE (ROXICODONE) 5 MG tablet Take 1 tablet (5 mg) by mouth every 6 hours as needed for moderate to severe pain  Qty: 10 tablet, Refills: 0    Associated Diagnoses: Fibromyalgia      pantoprazole (PROTONIX) 40 MG EC tablet Take 1 tablet (40 mg) by mouth 2 times daily (before meals)  Refills: 0    Associated Diagnoses: Perforated duodenal ulcer (H)      polyethylene glycol (MIRALAX/GLYCOLAX) packet Take 17 g by mouth 2 times daily as needed (constipation)  Refills: 0    Associated Diagnoses: Drug-induced constipation      potassium chloride ER (K-DUR/KLOR-CON M) 20 MEQ CR tablet Take 1 tablet (20 mEq) by mouth daily  Refills: 0    Associated Diagnoses: Hypertension goal BP (blood pressure) < 140/90         CONTINUE these medications which have CHANGED    Details   methadone (DOLOPHINE) 10 MG tablet Take 2 tablets (20 mg) by mouth 2 times daily Per Rx bottle take 2 tablets (20mg) in morning, 1 tablet (10mg) in afternoon, 2 tablets (20mg) in evening.  Qty: 20 tablet, Refills: 0    Associated Diagnoses: Fibromyalgia         CONTINUE these medications which have NOT CHANGED    Details   DULoxetine (CYMBALTA) 60 MG capsule Take 120 mg by mouth every morning      gabapentin (NEURONTIN) 800 MG tablet Take 1,600 mg by mouth 3 times daily      levothyroxine (SYNTHROID/LEVOTHROID) 88 MCG tablet TAKE 1 TABLET(88 MCG) BY MOUTH DAILY  Qty: 90 tablet, Refills: 0    Associated Diagnoses: Hypothyroidism due to acquired atrophy of thyroid      senna-docusate (SENOKOT-S;PERICOLACE) 8.6-50 MG per tablet Take 2 tablets by mouth 2 times daily  Qty: 100 tablet     Associated Diagnoses: Acute respiratory failure with hypoxia and hypercapnia (H)      albuterol (PROVENTIL) (2.5 MG/3ML) 0.083% neb solution Take 1 vial (2.5 mg) by nebulization every 4 hours as needed for shortness of breath / dyspnea or wheezing  Qty: 30 vial, Refills: 11    Associated Diagnoses: Moderate persistent asthma with acute exacerbation      blood glucose (NO BRAND SPECIFIED) lancets standard Use to test blood sugar 1 times daily or as directed.  Qty: 100 each, Refills: 11    Comments: Dispense per pt insurance per pt preference  Associated Diagnoses: Type 2 diabetes mellitus with diabetic nephropathy (H)      blood glucose monitoring (NO BRAND SPECIFIED) meter device kit Dispense freestyle zak glucose meter  Qty: 1 kit, Refills: 0    Associated Diagnoses: Type 2 diabetes mellitus with diabetic nephropathy (H)      blood glucose monitoring (NO BRAND SPECIFIED) test strip Use to test blood sugars 1 times daily or as directed  Qty: 100 strip, Refills: 11    Comments: Dispense per pt insurance per pt preference  Associated Diagnoses: Type 2 diabetes mellitus with diabetic nephropathy (H)      levalbuterol (XOPENEX) 1.25 MG/3ML neb solution TAKE ONE VIAL BY NEBULIZATION EVERY 8 HOURS AS NEEDED FOR SHORTNESS OF BREATH/DYSPNEA OR WHEEZING  Qty: 825 mL, Refills: 1    Comments: **Patient requests 90 days supply**  Associated Diagnoses: Preop general physical exam; Moderate persistent asthma with acute exacerbation      !! order for DME Equipment being ordered: Nebulizer  Qty: 1 each, Refills: 0    Associated Diagnoses: Moderate persistent asthma with acute exacerbation      !! order for DME Equipment being ordered: BIPAP.   15/5, Rate of 12, 2L O2 to keep sats 90-95%.  Qty: 1 each, Refills: 0    Associated Diagnoses: Acute respiratory failure with hypoxia and hypercapnia (H)      !! order for DME Equipment being ordered: Nebulizer  Qty: 1 Package, Refills: 0    Associated Diagnoses: Moderate persistent asthma  with acute exacerbation      !! order for DME Equipment being ordered: Hospital Bed, total electric (head, foot, and height adjustments), with any type side rails, with mattress  Qty: 1 each, Refills: 0    Associated Diagnoses: Wheelchair bound; Ankylosing spondylitis (H); Spinal stenosis in cervical region; Lumbar spinal stenosis; Fibromyalgia; Chronic low back pain      !! order for DME Equipment being ordered: Motorized Wheelchair  Qty: 1 each, Refills: 0    Associated Diagnoses: Wheelchair bound; Ankylosing spondylitis (H); Spinal stenosis in cervical region; Lumbar spinal stenosis; Fibromyalgia; Chronic low back pain      !! order for DME Equipment being ordered: Trapeze bar for hospital bed  Qty: 1 each, Refills: 0    Associated Diagnoses: Wheelchair bound; Ankylosing spondylitis (H); Spinal stenosis in cervical region; Lumbar spinal stenosis; Fibromyalgia; Chronic low back pain      simvastatin (ZOCOR) 20 MG tablet TAKE 1 TABLET(20 MG) BY MOUTH AT BEDTIME  Qty: 90 tablet, Refills: 3    Associated Diagnoses: Hyperlipidemia LDL goal <100      traZODone (DESYREL) 100 MG tablet Take 100 mg by mouth At Bedtime      VENTOLIN  (90 Base) MCG/ACT inhaler INHALE 2 PUFFS INTO THE LUNGS EVERY 6 HOURS AS NEEDED FOR SHORTNESS OF BREATH  Qty: 18 g, Refills: 0    Associated Diagnoses: Moderate persistent asthma with acute exacerbation       !! - Potential duplicate medications found. Please discuss with provider.      STOP taking these medications       blood glucose monitoring (FREESTYLE FREEDOM LITE) meter device kit Comments:   Reason for Stopping:         aspirin 81 MG tablet Comments:   Reason for Stopping:         hydrOXYzine (ATARAX) 50 MG tablet Comments:   Reason for Stopping:         lisinopril (PRINIVIL/ZESTRIL) 5 MG tablet Comments:   Reason for Stopping:         metFORMIN (GLUCOPHAGE-XR) 500 MG 24 hr tablet Comments:   Reason for Stopping:         multivitamin (CENTRUM SILVER) tablet Comments:   Reason  "for Stopping:         nitroFURantoin macrocrystal-monohydrate (MACROBID) 100 MG capsule Comments:   Reason for Stopping:         ondansetron (ZOFRAN ODT) 4 MG ODT tab Comments:   Reason for Stopping:         oxybutynin ER (DITROPAN XL) 15 MG 24 hr tablet Comments:   Reason for Stopping:         Phenazopyridine HCl (PYRIDIUM PO) Comments:   Reason for Stopping:         ranitidine (ZANTAC) 150 MG tablet Comments:   Reason for Stopping:         sulfamethoxazole-trimethoprim (BACTRIM DS/SEPTRA DS) 800-160 MG tablet Comments:   Reason for Stopping:         tiZANidine (ZANAFLEX) 4 MG tablet Comments:   Reason for Stopping:             Allergies   Allergies   Allergen Reactions     Povidone Iodine      \"mild skin irritation\" per patient report     Toradol [Ketorolac] Other (See Comments)     Pt gets nightmares     Tramadol Other (See Comments)     Data   Most Recent 3 CBC's:  Recent Labs   Lab Test 01/21/20  0555 01/18/20  0500 01/17/20  0620   WBC 15.0* 11.0 11.4*   HGB 9.0* 10.0* 9.0*   MCV 90 91 91    271 224      Most Recent 3 BMP's:  Recent Labs   Lab Test 01/21/20  0555 01/19/20  0535 01/18/20  0500    136 135   POTASSIUM 3.6 3.9 3.7   CHLORIDE 96 96 97   CO2 40* 37* 35*   BUN 25 28 26   CR 0.77 0.77 0.78   ANIONGAP 1* 3 3   DORY 9.8 9.3 9.3   * 152* 178*     Most Recent 2 LFT's:  Recent Labs   Lab Test 01/13/20  0545 01/10/20  0550   AST 28 43   ALT 36 75*   ALKPHOS 82 97   BILITOTAL 0.4 0.6     Most Recent INR's and Anticoagulation Dosing History:  Anticoagulation Dose History     Recent Dosing and Labs Latest Ref Rng & Units 8/28/2010 8/29/2010 8/30/2010 4/18/2016 7/26/2017 12/29/2019 12/29/2019    INR 0.86 - 1.14 1.20(H) 1.07 1.12 1.00 0.97 Canceled, Test credited 1.61(H)        Most Recent 3 Troponin's:  Recent Labs   Lab Test 01/18/20  0500 12/30/19  0610 12/29/19  2130   TROPI 0.037 12.097* 12.385*     Most Recent Cholesterol Panel:  Recent Labs   Lab Test 01/17/20  0620  05/21/19  1121 "   CHOL  --   --  201*   LDL  --   --  96   HDL  --   --  60   TRIG 112   < > 225*    < > = values in this interval not displayed.     Most Recent 6 Bacteria Isolates From Any Culture (See EPIC Reports for Culture Details):  Recent Labs   Lab Test 01/04/20  1455 01/02/20  1233 12/30/19  0425 12/29/19  1507 12/29/19  1435 12/05/19  1330   CULT No growth Cultured on the 1st day of incubation:  Staphylococcus hominis  *  Critical Value/Significant Value, preliminary result only, called to and read back by  Sangeeta SHERMAN 1.3.20 @2138 AEM    Susceptibility testing done on previous specimen Methicillin resistant Staphylococcus aureus (MRSA) isolated* No growth Cultured on the 1st day of incubation:  Staphylococcus hominis  *  Critical Value/Significant Value, preliminary result only, called to and read back by  Suhail Samano RN from [facility] ICU. 12/30/19 at 1202.TV.    Cultured on the 1st day of incubation:  Staphylococcus epidermidis  *  (Note)  POSITIVE for Staphylococci other than S.aureus, S.epidermidis and  S.lugdunensis, by Xapoigene multiplex nucleic acid test.  Coagulase-negative staphylococci are the most common venipuncture or  collection associated skin CONTAMINANTS grown in blood cultures.  Final identification and antimicrobial susceptibility testing will be  verified by standard methods.    Specimen tested with Verigene multiplex, gram-positive blood culture  nucleic acid test for the following targets: Staph aureus, Staph  epidermidis, Staph lugdunensis, other Staph species, Enterococcus  faecalis, Enterococcus faecium, Streptococcus species, S. agalactiae,  S. anginosus grp., S. pneumoniae, S. pyogenes, Listeria sp., mecA  (methicillin resistance) and Nick/B (vancomycin resistance).    Critical Value/Significant Value called to and read back by SUHAIL SAMANO RN 12/30/2019 @ 1439 BC   >100,000 colonies/mL  Providencia (Proteus) rettgeri  *  50,000 to 100,000 colonies/mL  Enterococcus  faecalis  *  10,000 to 50,000 colonies/mL  Methicillin resistant Staphylococcus aureus (MRSA)  *  10,000 to 50,000 colonies/mL  Aerococcus urinae  Identification obtained by MALDI-TOF mass spectrometry research use only database. Test   characteristics determined and verified by the [facility]   ([facility]) [place], [place].  *  No further identification  Susceptibility testing not routinely done    Susceptibility testing requested by  Dr. Thu Babb [address] Rn phone [phone] for ID and Sensitivities on all   organisms 12.9.19 CS       Most Recent TSH, T4 and A1c Labs:  Recent Labs   Lab Test 01/07/20  0350 10/28/19  1459  08/15/18  1224   TSH  --  0.86  --  0.28*   T4  --   --   --  1.03   A1C 7.1* 7.3*   < > 6.0*    < > = values in this interval not displayed.

## 2020-01-21 NOTE — DISCHARGE INSTRUCTIONS
Wound care: posterior upper thighs/ gluteal fold AND COCCYX- every other day and prn  1. Clean gently with normal sline  2. Paint entire area with 3M No Sting skin prep.  Use several skin preps, and dry.  Do not skip this step!    3. Press a Mepilex  Sacral Dressing or large (6x6) foam from formulary  to each thigh.   4. Press a Mepilex  Sacral Dressing or a 6x6 foam from formulary   to the coccyx, making sure to conform nicely to skin curvatures   5 Clean all skin folds with soap and water. Pat dry. Dust with powder BID    Turn pt every 2 hours and prn  Heel suspension @ all times  Mobilize as indicated.   [facility]  Discharge Instructions: Post-Operative Abdominal Surgery    ACTIVITY    Increase your activity gradually.  Avoid strenuous physical activity or heavy lifting greater than 15 lbs. for 4 weeks.  You may climb stairs.    You may drive without restrictions when you are not using any prescription pain medication and comfortable in a car.    You may return to work/school when you are comfortable without any prescription pain medication.    WOUND CARE    You may remove your bandage and shower 48 hours after the surgery.  Pat your incisions dry and leave open to air.  Re-apply dressing (Band-aid or gauze/tape) as needed for drainage.    You may have steri-strips (looks like tape) or Dermabond (looks like glue) on your incision.  Leave it alone, it will peel up and fall off on its own.  If you have staples, they will be removed at your next office visit.    Do not soak your incisions in a tub or pool for 2 weeks.     A lump or ridge under the incision is normal and will gradually resolve.    DIET    Advance your diet to a low residue diet for 2 weeks.  Avoid heavy, spicy, greasy meals and gas forming foods.     You may find your appetite to be diminished briefly after surgery.  You may take nutritional supplement shakes if you are able.     Drinks plenty of fluids to  stay hydrated.    PAIN    Expect some tenderness and discomfort at the incision site(s).  Use the prescribed pain medication at your discretion.  Expect gradual resolution of your pain over several days.    You may take ibuprofen with food (unless you have been told not to) instead of or in addition to your prescribed pain medication.  If you are taking Norco or Percocet, do not take any additional acetaminophen/APAP/Tylenol.    Do not drink alcohol or drive while you are taking pain medications.    You may apply ice to your incisions in 20 minute intervals as needed for the next 24-48 hours.  After that time, consider switching to heat if you prefer.    RETURN APPOINTMENT    Follow up with your surgeon in 10 days.  Please call the office at [phone] to schedule your appointment.  We are located at [address] [place] [address].    CALL OUR OFFICE IF YOU HAVE:     Chills or fever above 101.5 F.    Increased redness or drainage at your incisions.    Significant bleeding.    Pain not relieved by your pain medication or rest.    Increasing pain after the first 48 hours.    Any other concerns or questions.    HELPFUL HINTS    Pain medications can cause constipation.  Limit use when possible.  Take over the counter stool softener/stimulant, such as Colace or Senna, with plenty of water.  You may take a mild over the counter laxative, such as Miralax or a suppository, as needed.    For laparoscopic surgery, you may have shoulder or upper back discomfort due to the gas used in surgery.  This is temporary and should resolve in 48-72 hours.  Short, frequent walks may help with this.                                                                                                                                  Revised August 2017

## 2020-01-21 NOTE — PROGRESS NOTES
General Surgery    Agree with below  TCU today  Continue clear liquids  Continue g tube and Malecot to gravity drain.  THAI to bulb suction. J tube for feeding  Follow up with Dr Lopez in 4 weeks     Chika Ramirez PA-C      General Surgery--PA Student    Essentially same as yesterday. Alert, oriented, appropriately responsive.  Denied pain. Continues to decline BiPAP overnight and use nasal canula instead--less shortness of breath.  No availability of TCU yet and plans to go as soon as available.  Continues to move arms and legs independently. Provided with resistance bands by OT/PT for strengthening.    BP 95/54   Pulse 82   Temp 97.8  F (36.6  C) (Axillary)   Resp 20   Wt 93 kg (205 lb 0.4 oz)   SpO2 95%   BMI 34.65 kg/m      Exam:  Resp: non-labored, mildly shallow, clear throughout  Heart: RRR, no M/R/G or rubs  Abd: soft, non-distended, non-tender. Midline incision open to air, clean, dry--staples removed. All drains in place without erythema, clean/dry dressings. See I/O below.  Ext: Moving arms/legs independently bilateral. Full  strength. No LE edema or pain.    Labs:  WBC increased 15.0 1/21/2020 from 11.0 1/18/2020  HGB mild decrease 9.0 1/21/2020 from 10.0 1/21/2020    I/O 1/20/2020 0700 -- 1/21/2020 0700:  TP infusing at goal rate  G tube output increased to 450mL from 125mL, nothing in tube currently  Left THAI tube remains 0mL  Malecot output decreased to 50mL from 150mL, nothing in tube currently    Assessment/Plan:  Perforated duodenal ulcer with hemorrhagic shock and bowel ischemia, s/p ext lap, TRUONG, Malecot drain placement with perforation, and wound VAC on 12/29/2019. Repeat ex-lap, TRUONG, closure of duodenal perforation over Malecot, G-tube/J-tube placements, delayed primary closure on 1/1/2020.                 - Continue clear liquids, tube feedings              - Continue monitor drains, WBC, HGB              - Use resistance bands for strengthening per OT/PT  recommendations              - Discharge to TCU when available and cleared by hospitalist              - Follow-up with Dr. Lopez after CT scan in 3 weeks     ILIA Dubon

## 2020-01-21 NOTE — PLAN OF CARE
Oxy 5 mg given x2 for c/o backpain. Repositioned with 2 and ceiling lift Q2h, though refused twice. Dressings arounf G/J tubes changed x2. VSS

## 2020-01-21 NOTE — PLAN OF CARE
Pt up to chair with lift, did not want to stay in chair long, states its to hard on buttocks, taking in ice chips and some fluids, tolerates well, TPN remains infusing, output charted, staples removed from midline incision per orders, pain medications given as needed, PICC line in place, only red lumen working, lung sounds diminished, O2 sats mid to low 90's on 2-3L NC, refused to do IS,

## 2020-01-22 ENCOUNTER — PATIENT OUTREACH (OUTPATIENT)
Dept: CARE COORDINATION | Facility: CLINIC | Age: 65
End: 2020-01-22

## 2020-01-22 ENCOUNTER — TELEPHONE (OUTPATIENT)
Dept: CARDIOLOGY | Facility: CLINIC | Age: 65
End: 2020-01-22

## 2020-01-22 LAB — INTERPRETATION ECG - MUSE: NORMAL

## 2020-01-22 NOTE — LETTER
[facility]   To:             Please give to facility    From:   Ebony Nguyen RN  Care Coordinator   [facility]   P: [phone]  [email or web address]   Patient Name:  Bhavani White YOB: 1955   Admit date: 01/21/2020      *Information Needed:  Please contact me when the patient will discharge (or if they will move to long term care)- include the discharge date, disposition, and main diagnosis   - If the patient is discharged with home care services, please provide the name of the agency    Also- Please inform me if a care conference is being held.   Phone, Fax or Email with information                   Thank you

## 2020-01-22 NOTE — PLAN OF CARE
Physical Therapy Discharge Summary    Reason for therapy discharge:    Discharged to transitional care facility.    Progress towards therapy goal(s). See goals on Care Plan in [facility] electronic health record for goal details.  Goals not met.  Barriers to achieving goals:   limited tolerance for therapy, discharge from facility, and difficult medical course, low level baseline, pain, elevated air bed difficult for EOB dangle secondary to mattress.    Therapy recommendation(s):    Continued therapy is recommended.  Rationale/Recommendations:  Pt will benefit from continued skilled rehab services to continue to progress mobility, pt goal is to be laura to get in/out of her wc and be able to go home.

## 2020-01-22 NOTE — TELEPHONE ENCOUNTER
" Patient was evaluated by cardiology while inpatient for perforated duodenal ulcer with septic shock and subsequent elevated troponin and initial CM with EF of 30% that normalized to 60% prior to discharge. Thought to be demand ischemia secondary septic shock. Per Dr. Rios's IP note, \"Patient can be seen in cardiology clinic and we can consider a stress MRI to rule out any ischemia.  Patient has some coronary artery atherosclerosis on CT chest and therefore risk factor modification should be continued.  I will add a low-dose statin for now.  Follow LFTs.  We will arrange follow-up with cardiology.\" RN called [facility] to confirm the follow up plan for patient with Care Coordinator. RN confirmed with Care Coordinator that patient needs to schedule for f/u cardiology FIONA OV as ordered. Reminded to send last progress note, MAR, active orders, and provider order sheet to appt. PEBBLES Newton RN.  "

## 2020-01-22 NOTE — PROGRESS NOTES
Clinic Care Coordination Contact  Care Coordination Transition Communication         Clinical Data: Patient was hospitalized at [facility]  from 12/29/2019 to 01/21/2020 with diagnosis of perforated duodenal ulcer - s/p surgery.     Transition to Facility:              Facility Name: [facility]              Contact name and phone number/fax: RNCC sent secure PHI fax via communication management tab. Requested notification of patients discharge and any outstanding care coordination needs.      Plan: RN/SW Care Coordinator will await notification from facility staff informing RN/SW Care Coordinator of patient's discharge plans/needs. RN/SW Care Coordinator will review chart and outreach to facility staff every 4 weeks and as needed.     Ebony Nguyen RN Care Coordinator  [facility] Jerrell Ludwig  Email: [email or web address]  Phone: [phone]

## 2020-01-22 NOTE — PLAN OF CARE
Occupational Therapy Discharge Summary    Reason for therapy discharge:    Discharged to transitional care facility.    Progress towards therapy goal(s). See goals on Care Plan in [facility] electronic health record for goal details.  Goals not met.  Barriers to achieving goals:   limited tolerance for therapy and discharge from facility.    Therapy recommendation(s):  TCU  Continued therapy is recommended.  Rationale/Recommendations:  Per POC/chart:. Pt will require continued therapy to improve safety/ind w/ ADL's/mobility prior to returning home. Pt. discharged to TCU--[facility].    Note:This writer did not see/treat pt.--discharge written from chart review.

## 2020-01-25 ENCOUNTER — HOSPITAL ENCOUNTER (INPATIENT)
Facility: CLINIC | Age: 65
LOS: 17 days | Discharge: NURSING, REHAB OR LONG-TERM CARE | DRG: 372 | End: 2020-02-12
Attending: EMERGENCY MEDICINE | Admitting: HOSPITALIST
Payer: MEDICARE

## 2020-01-25 ENCOUNTER — APPOINTMENT (OUTPATIENT)
Dept: CT IMAGING | Facility: CLINIC | Age: 65
DRG: 372 | End: 2020-01-25
Attending: EMERGENCY MEDICINE
Payer: MEDICARE

## 2020-01-25 DIAGNOSIS — B37.9 INFECTION DUE TO CANDIDA GLABRATA: ICD-10-CM

## 2020-01-25 DIAGNOSIS — Z86.14 HISTORY OF MRSA INFECTION: Chronic | ICD-10-CM

## 2020-01-25 DIAGNOSIS — A49.1 INFECTION DUE TO ALPHA-HEMOLYTIC STREPTOCOCCUS: ICD-10-CM

## 2020-01-25 DIAGNOSIS — K65.1 INTRA-ABDOMINAL ABSCESS (H): ICD-10-CM

## 2020-01-25 DIAGNOSIS — J96.01 ACUTE RESPIRATORY FAILURE WITH HYPOXIA (H): ICD-10-CM

## 2020-01-25 DIAGNOSIS — E66.01 MORBID OBESITY (H): ICD-10-CM

## 2020-01-25 DIAGNOSIS — G92.9 TOXIC ENCEPHALOPATHY: ICD-10-CM

## 2020-01-25 DIAGNOSIS — D72.829 LEUKOCYTOSIS, UNSPECIFIED TYPE: ICD-10-CM

## 2020-01-25 DIAGNOSIS — M79.7 FIBROMYALGIA: ICD-10-CM

## 2020-01-25 DIAGNOSIS — K65.1 ABDOMINOPELVIC ABSCESS (H): Primary | ICD-10-CM

## 2020-01-25 LAB
ABO + RH BLD: NORMAL
ABO + RH BLD: NORMAL
ALBUMIN SERPL-MCNC: 1.8 G/DL (ref 3.4–5)
ALBUMIN UR-MCNC: 10 MG/DL
ALP SERPL-CCNC: 148 U/L (ref 40–150)
ALT SERPL W P-5'-P-CCNC: 22 U/L (ref 0–50)
ANION GAP SERPL CALCULATED.3IONS-SCNC: 1 MMOL/L (ref 3–14)
APPEARANCE UR: CLEAR
AST SERPL W P-5'-P-CCNC: 22 U/L (ref 0–45)
BASOPHILS # BLD AUTO: 0 10E9/L (ref 0–0.2)
BASOPHILS NFR BLD AUTO: 0 %
BILIRUB SERPL-MCNC: 0.3 MG/DL (ref 0.2–1.3)
BILIRUB UR QL STRIP: NEGATIVE
BLD GP AB SCN SERPL QL: NORMAL
BLOOD BANK CMNT PATIENT-IMP: NORMAL
BUN SERPL-MCNC: 27 MG/DL (ref 7–30)
CALCIUM SERPL-MCNC: 10.4 MG/DL (ref 8.5–10.1)
CHLORIDE SERPL-SCNC: 94 MMOL/L (ref 94–109)
CO2 SERPL-SCNC: 41 MMOL/L (ref 20–32)
COLOR UR AUTO: YELLOW
CREAT SERPL-MCNC: 0.62 MG/DL (ref 0.52–1.04)
DIFFERENTIAL METHOD BLD: ABNORMAL
EOSINOPHIL # BLD AUTO: 0.1 10E9/L (ref 0–0.7)
EOSINOPHIL NFR BLD AUTO: 1 %
ERYTHROCYTE [DISTWIDTH] IN BLOOD BY AUTOMATED COUNT: 15 % (ref 10–15)
GFR SERPL CREATININE-BSD FRML MDRD: >90 ML/MIN/{1.73_M2}
GLUCOSE SERPL-MCNC: 144 MG/DL (ref 70–99)
GLUCOSE UR STRIP-MCNC: NEGATIVE MG/DL
HCT VFR BLD AUTO: 32.2 % (ref 35–47)
HGB BLD-MCNC: 9.7 G/DL (ref 11.7–15.7)
HGB UR QL STRIP: NEGATIVE
HYALINE CASTS #/AREA URNS LPF: 1 /LPF (ref 0–2)
HYPOCHROMIA BLD QL: PRESENT
KETONES UR STRIP-MCNC: NEGATIVE MG/DL
LACTATE BLD-SCNC: 0.8 MMOL/L (ref 0.7–2)
LEUKOCYTE ESTERASE UR QL STRIP: ABNORMAL
LIPASE SERPL-CCNC: 164 U/L (ref 73–393)
LYMPHOCYTES # BLD AUTO: 0.8 10E9/L (ref 0.8–5.3)
LYMPHOCYTES NFR BLD AUTO: 6 %
MCH RBC QN AUTO: 26.9 PG (ref 26.5–33)
MCHC RBC AUTO-ENTMCNC: 30.1 G/DL (ref 31.5–36.5)
MCV RBC AUTO: 89 FL (ref 78–100)
METAMYELOCYTES # BLD: 0.4 10E9/L
METAMYELOCYTES NFR BLD MANUAL: 3 %
MONOCYTES # BLD AUTO: 1.2 10E9/L (ref 0–1.3)
MONOCYTES NFR BLD AUTO: 9 %
MYELOCYTES # BLD: 0.8 10E9/L
MYELOCYTES NFR BLD MANUAL: 6 %
NEUTROPHILS # BLD AUTO: 9.8 10E9/L (ref 1.6–8.3)
NEUTROPHILS NFR BLD AUTO: 75 %
NITRATE UR QL: NEGATIVE
PH UR STRIP: 6 PH (ref 5–7)
PLATELET # BLD AUTO: 336 10E9/L (ref 150–450)
PLATELET # BLD EST: ABNORMAL 10*3/UL
POLYCHROMASIA BLD QL SMEAR: SLIGHT
POTASSIUM SERPL-SCNC: 3.7 MMOL/L (ref 3.4–5.3)
PROT SERPL-MCNC: 6.3 G/DL (ref 6.8–8.8)
RBC # BLD AUTO: 3.61 10E12/L (ref 3.8–5.2)
RBC #/AREA URNS AUTO: 1 /HPF (ref 0–2)
RBC MORPH BLD: ABNORMAL
SODIUM SERPL-SCNC: 136 MMOL/L (ref 133–144)
SOURCE: ABNORMAL
SP GR UR STRIP: 1.02 (ref 1–1.03)
SPECIMEN EXP DATE BLD: NORMAL
TOXIC GRANULES BLD QL SMEAR: PRESENT
UROBILINOGEN UR STRIP-MCNC: NORMAL MG/DL (ref 0–2)
WBC # BLD AUTO: 13 10E9/L (ref 4–11)
WBC #/AREA URNS AUTO: 17 /HPF (ref 0–5)

## 2020-01-25 PROCEDURE — 86900 BLOOD TYPING SEROLOGIC ABO: CPT | Performed by: EMERGENCY MEDICINE

## 2020-01-25 PROCEDURE — 86850 RBC ANTIBODY SCREEN: CPT | Performed by: EMERGENCY MEDICINE

## 2020-01-25 PROCEDURE — 80053 COMPREHEN METABOLIC PANEL: CPT | Performed by: EMERGENCY MEDICINE

## 2020-01-25 PROCEDURE — 87186 SC STD MICRODIL/AGAR DIL: CPT | Performed by: EMERGENCY MEDICINE

## 2020-01-25 PROCEDURE — 25000128 H RX IP 250 OP 636: Performed by: EMERGENCY MEDICINE

## 2020-01-25 PROCEDURE — 87040 BLOOD CULTURE FOR BACTERIA: CPT | Performed by: EMERGENCY MEDICINE

## 2020-01-25 PROCEDURE — 96361 HYDRATE IV INFUSION ADD-ON: CPT

## 2020-01-25 PROCEDURE — 87070 CULTURE OTHR SPECIMN AEROBIC: CPT | Performed by: EMERGENCY MEDICINE

## 2020-01-25 PROCEDURE — 85025 COMPLETE CBC W/AUTO DIFF WBC: CPT | Performed by: EMERGENCY MEDICINE

## 2020-01-25 PROCEDURE — 81001 URINALYSIS AUTO W/SCOPE: CPT | Performed by: EMERGENCY MEDICINE

## 2020-01-25 PROCEDURE — 83690 ASSAY OF LIPASE: CPT | Performed by: EMERGENCY MEDICINE

## 2020-01-25 PROCEDURE — 25000125 ZZHC RX 250: Performed by: EMERGENCY MEDICINE

## 2020-01-25 PROCEDURE — 25800030 ZZH RX IP 258 OP 636: Performed by: EMERGENCY MEDICINE

## 2020-01-25 PROCEDURE — 96365 THER/PROPH/DIAG IV INF INIT: CPT | Mod: 59

## 2020-01-25 PROCEDURE — 87077 CULTURE AEROBIC IDENTIFY: CPT | Performed by: EMERGENCY MEDICINE

## 2020-01-25 PROCEDURE — 96376 TX/PRO/DX INJ SAME DRUG ADON: CPT

## 2020-01-25 PROCEDURE — 87088 URINE BACTERIA CULTURE: CPT | Performed by: EMERGENCY MEDICINE

## 2020-01-25 PROCEDURE — 96375 TX/PRO/DX INJ NEW DRUG ADDON: CPT

## 2020-01-25 PROCEDURE — 87086 URINE CULTURE/COLONY COUNT: CPT | Performed by: EMERGENCY MEDICINE

## 2020-01-25 PROCEDURE — 74177 CT ABD & PELVIS W/CONTRAST: CPT

## 2020-01-25 PROCEDURE — 83605 ASSAY OF LACTIC ACID: CPT | Performed by: EMERGENCY MEDICINE

## 2020-01-25 PROCEDURE — 99285 EMERGENCY DEPT VISIT HI MDM: CPT | Mod: 25

## 2020-01-25 PROCEDURE — 86901 BLOOD TYPING SEROLOGIC RH(D): CPT | Performed by: EMERGENCY MEDICINE

## 2020-01-25 PROCEDURE — 0W9F30Z DRAINAGE OF ABDOMINAL WALL WITH DRAINAGE DEVICE, PERCUTANEOUS APPROACH: ICD-10-PCS | Performed by: RADIOLOGY

## 2020-01-25 RX ORDER — IOPAMIDOL 755 MG/ML
125 INJECTION, SOLUTION INTRAVASCULAR ONCE
Status: COMPLETED | OUTPATIENT
Start: 2020-01-25 | End: 2020-01-25

## 2020-01-25 RX ORDER — SODIUM CHLORIDE 9 MG/ML
INJECTION, SOLUTION INTRAVENOUS CONTINUOUS
Status: DISCONTINUED | OUTPATIENT
Start: 2020-01-25 | End: 2020-01-26

## 2020-01-25 RX ORDER — MORPHINE SULFATE 4 MG/ML
4 INJECTION, SOLUTION INTRAMUSCULAR; INTRAVENOUS EVERY 30 MIN PRN
Status: DISCONTINUED | OUTPATIENT
Start: 2020-01-25 | End: 2020-01-26

## 2020-01-25 RX ADMIN — SODIUM CHLORIDE: 9 INJECTION, SOLUTION INTRAVENOUS at 22:48

## 2020-01-25 RX ADMIN — IOPAMIDOL 125 ML: 755 INJECTION, SOLUTION INTRAVENOUS at 23:48

## 2020-01-25 RX ADMIN — SODIUM CHLORIDE 78 ML: 9 INJECTION, SOLUTION INTRAVENOUS at 23:49

## 2020-01-25 RX ADMIN — MORPHINE SULFATE 4 MG: 4 INJECTION INTRAVENOUS at 23:31

## 2020-01-25 RX ADMIN — MORPHINE SULFATE 4 MG: 4 INJECTION INTRAVENOUS at 22:52

## 2020-01-25 ASSESSMENT — ENCOUNTER SYMPTOMS
BACK PAIN: 1
WOUND: 1

## 2020-01-25 ASSESSMENT — MIFFLIN-ST. JEOR: SCORE: 1689.87

## 2020-01-26 PROBLEM — R10.9 ABDOMINAL PAIN: Status: ACTIVE | Noted: 2020-01-26

## 2020-01-26 LAB
ALBUMIN SERPL-MCNC: 1.7 G/DL (ref 3.4–5)
ALP SERPL-CCNC: 136 U/L (ref 40–150)
ALT SERPL W P-5'-P-CCNC: 20 U/L (ref 0–50)
ANION GAP SERPL CALCULATED.3IONS-SCNC: <1 MMOL/L (ref 3–14)
AST SERPL W P-5'-P-CCNC: 22 U/L (ref 0–45)
BILIRUB SERPL-MCNC: 0.3 MG/DL (ref 0.2–1.3)
BUN SERPL-MCNC: 25 MG/DL (ref 7–30)
CALCIUM SERPL-MCNC: 10.2 MG/DL (ref 8.5–10.1)
CAPILLARY BLOOD COLLECTION: NORMAL
CHLORIDE SERPL-SCNC: 97 MMOL/L (ref 94–109)
CO2 SERPL-SCNC: 41 MMOL/L (ref 20–32)
CREAT SERPL-MCNC: 0.65 MG/DL (ref 0.52–1.04)
ERYTHROCYTE [DISTWIDTH] IN BLOOD BY AUTOMATED COUNT: 15.1 % (ref 10–15)
GFR SERPL CREATININE-BSD FRML MDRD: >90 ML/MIN/{1.73_M2}
GLUCOSE BLDC GLUCOMTR-MCNC: 105 MG/DL (ref 70–99)
GLUCOSE BLDC GLUCOMTR-MCNC: 106 MG/DL (ref 70–99)
GLUCOSE BLDC GLUCOMTR-MCNC: 111 MG/DL (ref 70–99)
GLUCOSE BLDC GLUCOMTR-MCNC: 111 MG/DL (ref 70–99)
GLUCOSE BLDC GLUCOMTR-MCNC: 85 MG/DL (ref 70–99)
GLUCOSE SERPL-MCNC: 105 MG/DL (ref 70–99)
HCT VFR BLD AUTO: 33.1 % (ref 35–47)
HGB BLD-MCNC: 9.8 G/DL (ref 11.7–15.7)
MCH RBC QN AUTO: 26.7 PG (ref 26.5–33)
MCHC RBC AUTO-ENTMCNC: 29.6 G/DL (ref 31.5–36.5)
MCV RBC AUTO: 90 FL (ref 78–100)
PLATELET # BLD AUTO: 342 10E9/L (ref 150–450)
POTASSIUM SERPL-SCNC: 4 MMOL/L (ref 3.4–5.3)
PROT SERPL-MCNC: 5.8 G/DL (ref 6.8–8.8)
RBC # BLD AUTO: 3.67 10E12/L (ref 3.8–5.2)
SODIUM SERPL-SCNC: 138 MMOL/L (ref 133–144)
VANCOMYCIN SERPL-MCNC: 19.1 MG/L
WBC # BLD AUTO: 11.5 10E9/L (ref 4–11)

## 2020-01-26 PROCEDURE — 25000128 H RX IP 250 OP 636

## 2020-01-26 PROCEDURE — 25800030 ZZH RX IP 258 OP 636: Performed by: HOSPITALIST

## 2020-01-26 PROCEDURE — 99207 ZZC NO BILLABLE SERVICE THIS VISIT: CPT | Performed by: INTERNAL MEDICINE

## 2020-01-26 PROCEDURE — 25000125 ZZHC RX 250: Performed by: HOSPITALIST

## 2020-01-26 PROCEDURE — 85027 COMPLETE CBC AUTOMATED: CPT | Performed by: HOSPITALIST

## 2020-01-26 PROCEDURE — 80202 ASSAY OF VANCOMYCIN: CPT | Performed by: HOSPITALIST

## 2020-01-26 PROCEDURE — 80053 COMPREHEN METABOLIC PANEL: CPT | Performed by: HOSPITALIST

## 2020-01-26 PROCEDURE — 99231 SBSQ HOSP IP/OBS SF/LOW 25: CPT | Performed by: SURGERY

## 2020-01-26 PROCEDURE — 12000000 ZZH R&B MED SURG/OB

## 2020-01-26 PROCEDURE — 00000146 ZZHCL STATISTIC GLUCOSE BY METER IP

## 2020-01-26 PROCEDURE — 36415 COLL VENOUS BLD VENIPUNCTURE: CPT | Performed by: HOSPITALIST

## 2020-01-26 PROCEDURE — 25800030 ZZH RX IP 258 OP 636

## 2020-01-26 PROCEDURE — 99223 1ST HOSP IP/OBS HIGH 75: CPT | Mod: AI | Performed by: HOSPITALIST

## 2020-01-26 PROCEDURE — 25800030 ZZH RX IP 258 OP 636: Performed by: EMERGENCY MEDICINE

## 2020-01-26 PROCEDURE — 25000128 H RX IP 250 OP 636: Performed by: EMERGENCY MEDICINE

## 2020-01-26 PROCEDURE — 25000132 ZZH RX MED GY IP 250 OP 250 PS 637: Mod: GY | Performed by: HOSPITALIST

## 2020-01-26 PROCEDURE — 25000128 H RX IP 250 OP 636: Performed by: HOSPITALIST

## 2020-01-26 RX ORDER — POTASSIUM CHLORIDE 1.5 G/1.58G
20-40 POWDER, FOR SOLUTION ORAL
Status: DISCONTINUED | OUTPATIENT
Start: 2020-01-26 | End: 2020-02-12 | Stop reason: HOSPADM

## 2020-01-26 RX ORDER — POLYETHYLENE GLYCOL 3350 17 G/17G
17 POWDER, FOR SOLUTION ORAL DAILY PRN
Status: DISCONTINUED | OUTPATIENT
Start: 2020-01-26 | End: 2020-02-12 | Stop reason: HOSPADM

## 2020-01-26 RX ORDER — NALOXONE HYDROCHLORIDE 0.4 MG/ML
.1-.4 INJECTION, SOLUTION INTRAMUSCULAR; INTRAVENOUS; SUBCUTANEOUS
Status: DISCONTINUED | OUTPATIENT
Start: 2020-01-26 | End: 2020-02-12 | Stop reason: HOSPADM

## 2020-01-26 RX ORDER — ACETAMINOPHEN 325 MG/1
650 TABLET ORAL EVERY 4 HOURS PRN
Status: DISCONTINUED | OUTPATIENT
Start: 2020-01-26 | End: 2020-02-12 | Stop reason: HOSPADM

## 2020-01-26 RX ORDER — POTASSIUM CL/LIDO/0.9 % NACL 10MEQ/0.1L
10 INTRAVENOUS SOLUTION, PIGGYBACK (ML) INTRAVENOUS
Status: DISCONTINUED | OUTPATIENT
Start: 2020-01-26 | End: 2020-02-12 | Stop reason: HOSPADM

## 2020-01-26 RX ORDER — ACETAMINOPHEN 650 MG/1
650 SUPPOSITORY RECTAL EVERY 4 HOURS PRN
Status: DISCONTINUED | OUTPATIENT
Start: 2020-01-26 | End: 2020-02-12 | Stop reason: HOSPADM

## 2020-01-26 RX ORDER — ALBUTEROL SULFATE 0.83 MG/ML
2.5 SOLUTION RESPIRATORY (INHALATION) EVERY 4 HOURS PRN
Status: DISCONTINUED | OUTPATIENT
Start: 2020-01-26 | End: 2020-01-28

## 2020-01-26 RX ORDER — LABETALOL HYDROCHLORIDE 5 MG/ML
10 INJECTION, SOLUTION INTRAVENOUS
Status: DISCONTINUED | OUTPATIENT
Start: 2020-01-26 | End: 2020-02-12 | Stop reason: HOSPADM

## 2020-01-26 RX ORDER — ONDANSETRON 2 MG/ML
4 INJECTION INTRAMUSCULAR; INTRAVENOUS EVERY 6 HOURS PRN
Status: DISCONTINUED | OUTPATIENT
Start: 2020-01-26 | End: 2020-01-31

## 2020-01-26 RX ORDER — POTASSIUM CHLORIDE 29.8 MG/ML
20 INJECTION INTRAVENOUS
Status: DISCONTINUED | OUTPATIENT
Start: 2020-01-26 | End: 2020-02-12 | Stop reason: HOSPADM

## 2020-01-26 RX ORDER — HYDROMORPHONE HYDROCHLORIDE 1 MG/ML
0.2 INJECTION, SOLUTION INTRAMUSCULAR; INTRAVENOUS; SUBCUTANEOUS
Status: DISCONTINUED | OUTPATIENT
Start: 2020-01-26 | End: 2020-02-09 | Stop reason: DRUGHIGH

## 2020-01-26 RX ORDER — SODIUM CHLORIDE 9 MG/ML
INJECTION, SOLUTION INTRAVENOUS CONTINUOUS
Status: DISCONTINUED | OUTPATIENT
Start: 2020-01-26 | End: 2020-01-29

## 2020-01-26 RX ORDER — PIPERACILLIN SODIUM, TAZOBACTAM SODIUM 3; .375 G/15ML; G/15ML
3.38 INJECTION, POWDER, LYOPHILIZED, FOR SOLUTION INTRAVENOUS EVERY 6 HOURS
Status: DISCONTINUED | OUTPATIENT
Start: 2020-01-26 | End: 2020-01-29

## 2020-01-26 RX ORDER — HYDROCODONE BITARTRATE AND ACETAMINOPHEN 5; 325 MG/1; MG/1
1-2 TABLET ORAL EVERY 4 HOURS PRN
Status: DISCONTINUED | OUTPATIENT
Start: 2020-01-26 | End: 2020-02-09 | Stop reason: ALTCHOICE

## 2020-01-26 RX ORDER — BISACODYL 10 MG
10 SUPPOSITORY, RECTAL RECTAL DAILY PRN
Status: DISCONTINUED | OUTPATIENT
Start: 2020-01-26 | End: 2020-02-12 | Stop reason: HOSPADM

## 2020-01-26 RX ORDER — LIDOCAINE 40 MG/G
CREAM TOPICAL
Status: DISCONTINUED | OUTPATIENT
Start: 2020-01-26 | End: 2020-02-12 | Stop reason: HOSPADM

## 2020-01-26 RX ORDER — ONDANSETRON 4 MG/1
4 TABLET, ORALLY DISINTEGRATING ORAL EVERY 6 HOURS PRN
Status: DISCONTINUED | OUTPATIENT
Start: 2020-01-26 | End: 2020-01-31

## 2020-01-26 RX ORDER — NICOTINE POLACRILEX 4 MG
15-30 LOZENGE BUCCAL
Status: DISCONTINUED | OUTPATIENT
Start: 2020-01-26 | End: 2020-02-12 | Stop reason: HOSPADM

## 2020-01-26 RX ORDER — MAGNESIUM SULFATE HEPTAHYDRATE 40 MG/ML
4 INJECTION, SOLUTION INTRAVENOUS EVERY 4 HOURS PRN
Status: DISCONTINUED | OUTPATIENT
Start: 2020-01-26 | End: 2020-02-12 | Stop reason: HOSPADM

## 2020-01-26 RX ORDER — AMOXICILLIN 250 MG
2 CAPSULE ORAL 2 TIMES DAILY PRN
Status: DISCONTINUED | OUTPATIENT
Start: 2020-01-26 | End: 2020-02-12 | Stop reason: HOSPADM

## 2020-01-26 RX ORDER — DEXTROSE MONOHYDRATE 25 G/50ML
25-50 INJECTION, SOLUTION INTRAVENOUS
Status: DISCONTINUED | OUTPATIENT
Start: 2020-01-26 | End: 2020-02-12 | Stop reason: HOSPADM

## 2020-01-26 RX ORDER — POTASSIUM CHLORIDE 7.45 MG/ML
10 INJECTION INTRAVENOUS
Status: DISCONTINUED | OUTPATIENT
Start: 2020-01-26 | End: 2020-02-12 | Stop reason: HOSPADM

## 2020-01-26 RX ORDER — POTASSIUM CHLORIDE 1500 MG/1
20-40 TABLET, EXTENDED RELEASE ORAL
Status: DISCONTINUED | OUTPATIENT
Start: 2020-01-26 | End: 2020-02-12 | Stop reason: HOSPADM

## 2020-01-26 RX ORDER — AMOXICILLIN 250 MG
1 CAPSULE ORAL 2 TIMES DAILY PRN
Status: DISCONTINUED | OUTPATIENT
Start: 2020-01-26 | End: 2020-02-12 | Stop reason: HOSPADM

## 2020-01-26 RX ORDER — PIPERACILLIN SODIUM, TAZOBACTAM SODIUM 4; .5 G/20ML; G/20ML
4.5 INJECTION, POWDER, LYOPHILIZED, FOR SOLUTION INTRAVENOUS ONCE
Status: COMPLETED | OUTPATIENT
Start: 2020-01-26 | End: 2020-01-26

## 2020-01-26 RX ADMIN — VANCOMYCIN HYDROCHLORIDE 2000 MG: 5 INJECTION, POWDER, LYOPHILIZED, FOR SOLUTION INTRAVENOUS at 21:00

## 2020-01-26 RX ADMIN — SODIUM CHLORIDE: 9 INJECTION, SOLUTION INTRAVENOUS at 03:13

## 2020-01-26 RX ADMIN — PIPERACILLIN AND TAZOBACTAM 3.38 G: 3; .375 INJECTION, POWDER, LYOPHILIZED, FOR SOLUTION INTRAVENOUS at 08:47

## 2020-01-26 RX ADMIN — HYDROCODONE BITARTRATE AND ACETAMINOPHEN 2 TABLET: 5; 325 TABLET ORAL at 13:43

## 2020-01-26 RX ADMIN — VANCOMYCIN HYDROCHLORIDE 2500 MG: 1 INJECTION, POWDER, LYOPHILIZED, FOR SOLUTION INTRAVENOUS at 03:29

## 2020-01-26 RX ADMIN — HYDROCODONE BITARTRATE AND ACETAMINOPHEN 2 TABLET: 5; 325 TABLET ORAL at 08:58

## 2020-01-26 RX ADMIN — HYDROCODONE BITARTRATE AND ACETAMINOPHEN 2 TABLET: 5; 325 TABLET ORAL at 17:23

## 2020-01-26 RX ADMIN — PIPERACILLIN AND TAZOBACTAM 3.38 G: 3; .375 INJECTION, POWDER, LYOPHILIZED, FOR SOLUTION INTRAVENOUS at 19:54

## 2020-01-26 RX ADMIN — HYDROMORPHONE HYDROCHLORIDE 0.2 MG: 1 INJECTION, SOLUTION INTRAMUSCULAR; INTRAVENOUS; SUBCUTANEOUS at 20:33

## 2020-01-26 RX ADMIN — PIPERACILLIN AND TAZOBACTAM 3.38 G: 3; .375 INJECTION, POWDER, LYOPHILIZED, FOR SOLUTION INTRAVENOUS at 13:46

## 2020-01-26 RX ADMIN — HYDROCODONE BITARTRATE AND ACETAMINOPHEN 2 TABLET: 5; 325 TABLET ORAL at 22:40

## 2020-01-26 RX ADMIN — ALBUTEROL SULFATE 2.5 MG: 2.5 SOLUTION RESPIRATORY (INHALATION) at 20:56

## 2020-01-26 RX ADMIN — PIPERACILLIN AND TAZOBACTAM 4.5 G: 4; .5 INJECTION, POWDER, FOR SOLUTION INTRAVENOUS at 01:34

## 2020-01-26 ASSESSMENT — ACTIVITIES OF DAILY LIVING (ADL)
ADLS_ACUITY_SCORE: 30
ADLS_ACUITY_SCORE: 30
ADLS_ACUITY_SCORE: 28
ADLS_ACUITY_SCORE: 28
ADLS_ACUITY_SCORE: 30

## 2020-01-26 ASSESSMENT — MIFFLIN-ST. JEOR: SCORE: 1777.88

## 2020-01-26 NOTE — PROGRESS NOTES
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  She does have a suprapubic catheter, right-sided drain that goes to right upper quadrant, GJ tube, abdominal incision healing well, CT showing Heterogeneous areas of right renal hypo enhancement, UA is slightly abnormal but she has a suprapubic catheter, currently on antibiotics for this complex mass/possible abscess, surgery consult pending, continue n.p.o. until seen by surgery.  Discussed with patient and nursing.  Pain controlled.

## 2020-01-26 NOTE — PLAN OF CARE
A&Ox4, lethargic. VSS on 4L NC, continuous pulse ox on. C/o back pain but asleep before meds verified. PIV infusing IVF. L THAI to bulb suction. L SP cath patent, leaking. Malecot patent, OP adequate. G-tube to gravity, leaking. J-tube clamped, leaking. All dressings on drains changed. Midline wound w/ packing reddened w/ small opening, small drainage; dressing changed. Many small open area on buttocks, mepilex changed. Coccyx red/purple, blanchable; dressing changed. No BM in 1 week, passing gas. NPO. Up w/ lift, turn/repo q 2 hr. Surgery to see.

## 2020-01-26 NOTE — ED NOTES
Bed: ED26  Expected date: 1/25/20  Expected time: 10:20 PM  Means of arrival: Ambulance  Comments:  Laura [phone] Jobspotting issue; back pain

## 2020-01-26 NOTE — H&P
[facility]    History and Physical  Hospitalist       Date of Admission:  1/25/2020  Date of Service (when I saw the patient): 01/26/20    Assessment & Plan   Bhavani White is a 64 year old female who presents with abdominal pain and wound check.  Admitted for further evaluation and treatment.    Complex anterior upper abdominal fluid collections, recent discharge on January 21, 2020 status post perforated duodenal ulcer status post exploratory laparotomy primary closure delayed on January 1, 2020: Patient with recent discharge on January 21 of 2020 for a perforated duodenal ulcer and associated hemorrhagic shock and bowel ischemia status post exploratory laparotomy with drain placement and wound VAC.  Per report, patient with GJ tube and yellow discharge leaking around the site this afternoon prior to admission.  However, staff at facility noted more discharge and decided to call EMS, patient sent to the emergency department for further evaluation and treatment.  Patient does complain of back pain.  Patient is maintained on nasal cannula oxygen.  Patient denies chest pain, shortness of breath, dysuria, blood in the stool or urine, headache, vision pain, ear pain, cough, sore throat, rash, fever.  On 4 L nasal cannula oxygen in the emergency department.  CT on admission shows complex anterior upper abdominal fluid collections more loculated than previous, likely developing abscesses with increased volume, communication with left upper quadrant collection to the multiloculated anterior epigastric collection, increased ascites, pleural effusions, right renal hypoenhancement, suprapubic bladder catheter and percutaneous jejunostomy, see report for further details.  -Consider interventional radiology consult.  -Surgery consulted.  -Close hemodynamic monitoring.  -Supportive cares.  -Gentle IV fluids.  -Vancomycin   -Zosyn.  -Cultures pending.  -Air mattress.    Cardiac, hyperlipidemia, hypertension:  Patient maintained on statin prior to admission.  Previous echocardiogram with EF at 60 to 65%, right ventricular global function probably normal, per report.  -Continue prior to admission simvastatin when verified by pharmacy.  -PRN antihypertensives.  -Continue prior to admission amlodipine when verified by pharmacy.  -Continue prior to admission Lasix when verified by pharmacy.    Diabetes mellitus, insulin-dependent: Patient maintained on Lantus prior to admission.  -Continue prior to admission Lantus 27 units subcutaneous every morning when verified by pharmacy.  -Insulin sliding scale, medium.    Hypothyroidism: Stable.  -Continue prior to admission levothyroxine when verified by pharmacy.    Chronic pain, on chronic methadone, anxiety, depression: Patient maintained on methadone prior to admission.  -Continue prior to admission methadone when verified by pharmacy.  -Pain control.  -Continue prior to admission duloxetine when verified by pharmacy.  -Continue prior to admission gabapentin when verified by pharmacy.    GERD: Stable.  -Continue prior to admission pantoprazole when verified by pharmacy.    Insomnia: Stable.  -Continue prior to admission trazodone when verified by pharmacy.    DVT Prophylaxis: Pneumatic Compression Devices  Code Status: Full Code    Disposition: Inpatient.    SEMAJ Cobos.O.  [facility] Hospitalist  Pager [phone]    Primary Care Physician   [facility]    Chief Complaint   Wound check    History is obtained from the patient and medical records.     History of Present Illness   Bhavani White is a 64 year old female who presents with abdominal pain and wound check.  Admitted for further evaluation and treatment. Complex anterior upper abdominal fluid collections, recent discharge on January 21, 2020 status post perforated duodenal ulcer status post exploratory laparotomy primary closure delayed on January 1, 2020: Patient with recent  discharge on  for a perforated duodenal ulcer and associated hemorrhagic shock and bowel ischemia status post exploratory laparotomy with drain placement and wound VAC.  Per report, patient with GJ tube and yellow discharge leaking around the site this afternoon prior to admission.  However, staff at facility noted more discharge and decided to call EMS, patient sent to the emergency department for further evaluation and treatment.  Patient does complain of back pain.  Patient is maintained on nasal cannula oxygen.  Patient denies chest pain, shortness of breath, dysuria, blood in the stool or urine, headache, vision pain, ear pain, cough, sore throat, rash, fever.  On 4 L nasal cannula oxygen in the emergency department.    Past Medical History    I have reviewed this patient's medical history and updated it with pertinent information if needed.   Past Medical History:   Diagnosis Date     Anxiety      Asthma      Depression      DM (diabetes mellitus) (H)      Frequent UTI      History of blood transfusion      History of ulcer disease 2014    She notes from NSAIDs; nearly . Discussed a hospitalization at [facility] for this.     Hypertension      Hypothyroid      Iatrogenic Cushing's disease (H)     off prednisone now     Morbid obesity (H)      MRSA (methicillin resistant staph aureus) culture positive 2012    repeat cx 10/12/2012 no staph mentioned.     Osteoarthritis     multiple joings.     Other chronic pain      Sleep apnea     No longer uses cpap.       Past Surgical History   I have reviewed this patient's surgical history and updated it with pertinent information if needed.  Past Surgical History:   Procedure Laterality Date     ABDOMEN SURGERY       APPLY WOUND VAC  2019    Procedure: Apply Wound Vac;  Surgeon: Ayan Lopez MD;  Location: [facility] OR     C LAMINECTOMY,FACETECTOMY,LUMBAR  1982     C TOTAL KNEE ARTHROPLASTY      left     CLOSE SECONDARY WOUND  ABDOMEN N/A 1/1/2020    Procedure: SECONDARY ABDOMINAL WOUND CLOSURE;  Surgeon: Ayan Lopez MD;  Location: [facility] OR     CYSTOSCOPY N/A 9/21/2018    Procedure: CYSTOSCOPY;  Cystoscopy and Botox Injection Into the Bladder and suprapubic tube exchange;  Surgeon: Yoandy Rios MD;  [facility]     CYSTOSCOPY N/A 3/15/2019    Procedure: Cystoscopy, suprapubic tube exchange;  Surgeon: Yoandy Rios MD;  [facility]     CYSTOSCOPY FLEXIBLE, CYSTOSTOMY, INSERT TUBE SUPRAPUBIC, COMBINED N/A 11/1/2019    Procedure: Cystoscopy, Bladder Botox Injection, Suprapubic Tube Change;  Surgeon: Yoandy Rios MD;  [facility]     CYSTOSCOPY, INTRAVESICAL INJECTION N/A 8/23/2017    Procedure: CYSTOSCOPY, INTRAVESICAL INJECTION;  Cystoscopy, Botox Injection Into The Bladder  Latex Allergy ;  Surgeon: Yoandy Rios MD;  [facility]: [facility]     CYSTOSCOPY, INTRAVESICAL INJECTION N/A 3/8/2018    Procedure: CYSTOSCOPY, INTRAVESICAL INJECTION;  Cystoscopy, Botox Injection Into The Bladder and suprapubic catheter exchange;  Surgeon: Yoandy Rios MD;  [facility]: [facility]     DISCECTOMY, FUSION CERVICAL ANTERIOR THREE+ LEVELS, COMBINED Left 5/3/2016    Procedure: COMBINED DISCECTOMY, FUSION CERVICAL ANTERIOR THREE+ LEVELS;  Surgeon: Jasvir Torres MD;  Location: [facility]     GASTROSTOMY, INSERT TUBE, COMBINED N/A 1/1/2020    Procedure: GASTRIC-JEJUNAL FEEDING TUBE INSERTION;  Surgeon: Ayan Lopez MD;  Location:  OR     GENITOURINARY SURGERY      suprapubic catheter     HERNIA REPAIR  1957    double hernia     HYSTERECTOMY, PAP NO LONGER INDICATED      CELIA and large ovarian tumor removed     INJECT BOTOX N/A 9/21/2018    Procedure: INJECT BOTOX;;  Surgeon: Yoandy Rios MD;  [facility]     INJECT BOTOX N/A 3/15/2019    Procedure: Inject Botox into Bladder;  Surgeon: Yoandy Rios MD;  [facility]     LAPAROSCOPY DIAGNOSTIC (GENERAL) N/A 12/30/2019     Procedure: Laparoscopy diagnostic (general);  Surgeon: Ayan Lopez MD;  [facility]     LAPAROTOMY EXPLORATORY N/A 2020    Procedure: EXPLORATORY LAPAROTOMY;  Surgeon: Ayan Lopez MD;  [facility]: [facility]     LAPAROTOMY EXPLORATORY N/A 2019    Procedure: LAPAROTOMY, REPAIR OF ULCER PERFORATION;  Surgeon: Ayan Lopez MD;  Location: [facility]     SURGICAL HISTORY OF -       left cataract surgery     SURGICAL HISTORY OF -       right cataract surgery and left laser revision     SURGICAL HISTORY OF -   2015    left carpal tunnel release     TONSILLECTOMY  1974       Prior to Admission Medications   Prior to Admission Medications   Prescriptions Last Dose Informant Patient Reported? Taking?   DULoxetine (CYMBALTA) 60 MG capsule   Yes No   Sig: Take 120 mg by mouth every morning   VENTOLIN  (90 Base) MCG/ACT inhaler   No No   Sig: INHALE 2 PUFFS INTO THE LUNGS EVERY 6 HOURS AS NEEDED FOR SHORTNESS OF BREATH   albuterol (PROVENTIL) (2.5 MG/3ML) 0.083% neb solution   No No   Sig: Take 1 vial (2.5 mg) by nebulization every 4 hours as needed for shortness of breath / dyspnea or wheezing   amLODIPine (NORVASC) 5 MG tablet   No No   Si tablet (5 mg) by Oral or Feeding Tube route daily   bisacodyl (DULCOLAX) 10 MG suppository   No No   Sig: Place 1 suppository (10 mg) rectally daily as needed for constipation   blood glucose (NO BRAND SPECIFIED) lancets standard   No No   Sig: Use to test blood sugar 1 times daily or as directed.   blood glucose monitoring (FREESTYLE FREEDOM LITE) meter device kit   No No   Sig: Use to test blood sugar.   blood glucose monitoring (NO BRAND SPECIFIED) meter device kit   No No   Sig: Dispense freestyle zak glucose meter   blood glucose monitoring (NO BRAND SPECIFIED) test strip   No No   Sig: Use to test blood sugars 1 times daily or as directed   docusate sodium (COLACE) 100 MG capsule   No No   Sig: Take 2 capsules (200 mg) by mouth  2 times daily   furosemide (LASIX) 40 MG tablet   No No   Sig: Take 1 tablet (40 mg) by mouth daily   gabapentin (NEURONTIN) 800 MG tablet   Yes No   Sig: Take 1,600 mg by mouth 3 times daily   hypromellose-dextran (ARTIFICAL TEARS) 0.1-0.3 % ophthalmic solution   No No   Sig: Place 1 drop into both eyes 3 times daily as needed for dry eyes   insulin aspart (NOVOLOG PEN) 100 UNIT/ML pen   No No   Sig: Inject 0-6 Units Subcutaneous 2 times daily Twice a day, for glucometer 150-200 give 2 units, 201-250 4 units, >250 6 units.   insulin glargine (LANTUS PEN) 100 UNIT/ML pen   No No   Sig: Inject 27 Units Subcutaneous every morning (before breakfast)   levalbuterol (XOPENEX) 1.25 MG/3ML neb solution   Yes No   Sig: TAKE ONE VIAL BY NEBULIZATION EVERY 8 HOURS AS NEEDED FOR SHORTNESS OF BREATH/DYSPNEA OR WHEEZING   levothyroxine (SYNTHROID/LEVOTHROID) 88 MCG tablet   No No   Sig: TAKE 1 TABLET(88 MCG) BY MOUTH DAILY   methadone (DOLOPHINE) 10 MG tablet   No No   Sig: Take 2 tablets (20 mg) by mouth 2 times daily Per Rx bottle take 2 tablets (20mg) in morning, 1 tablet (10mg) in afternoon, 2 tablets (20mg) in evening.   miconazole (MICATIN/MICRO GUARD) 2 % external powder   No No   Sig: Apply topically 2 times daily as needed for other (topical candidiasis)   order for DME   No No   Sig: Equipment being ordered: Hospital Bed, total electric (head, foot, and height adjustments), with any type side rails, with mattress   order for DME   No No   Sig: Equipment being ordered: Motorized Wheelchair   order for DME   No No   Sig: Equipment being ordered: Trapeze bar for hospital bed   order for DME   No No   Sig: Equipment being ordered: Nebulizer   order for DME   No No   Sig: Equipment being ordered: BIPAP.   15/5, Rate of 12, 2L O2 to keep sats 90-95%.   order for DME   No No   Sig: Equipment being ordered: Nebulizer   oxyCODONE (ROXICODONE) 5 MG tablet   No No   Sig: Take 1 tablet (5 mg) by mouth every 6 hours as needed for  "moderate to severe pain   pantoprazole (PROTONIX) 40 MG EC tablet   No No   Sig: Take 1 tablet (40 mg) by mouth 2 times daily (before meals)   polyethylene glycol (MIRALAX/GLYCOLAX) packet   No No   Sig: Take 17 g by mouth 2 times daily as needed (constipation)   potassium chloride ER (K-DUR/KLOR-CON M) 20 MEQ CR tablet   No No   Sig: Take 1 tablet (20 mEq) by mouth daily   senna-docusate (SENOKOT-S;PERICOLACE) 8.6-50 MG per tablet   No No   Sig: Take 2 tablets by mouth 2 times daily   simvastatin (ZOCOR) 20 MG tablet   No No   Sig: TAKE 1 TABLET(20 MG) BY MOUTH AT BEDTIME   traZODone (DESYREL) 100 MG tablet   Yes No   Sig: Take 100 mg by mouth At Bedtime      Facility-Administered Medications: None     Allergies   Allergies   Allergen Reactions     Povidone Iodine      \"mild skin irritation\" per patient report     Toradol [Ketorolac] Other (See Comments)     Pt gets nightmares     Tramadol Other (See Comments)       Social History   I have reviewed this patient's social history and updated it with pertinent information if needed. Bhavani White  reports that she has never smoked. She has never used smokeless tobacco. She reports current drug use. Drug: Marijuana. She reports that she does not drink alcohol.    Family History   I have reviewed this patient's family history and updated it with pertinent information if needed.   Family History   Problem Relation Age of Onset     Depression Son      Hypertension Mother      Heart Disease Mother         bypass     Depression Mother      Hypertension Father      Connective Tissue Disorder Father         ankylosing spondylitis     Cancer Father         colon; prostate     Other Cancer Father         bladder cancer     Depression Sister        Review of Systems   The 10 point Review of Systems is negative other than noted in the HPI or here.     Physical Exam   Temp: 98.9  F (37.2  C) Temp src: Oral BP: 114/70 Pulse: 73 Heart Rate: 81 Resp: 18 SpO2: 98 % O2 Device: Nasal " cannula Oxygen Delivery: 4 LPM  Vital Signs with Ranges  Temp:  [98.9  F (37.2  C)] 98.9  F (37.2  C)  Pulse:  [73-75] 73  Heart Rate:  [81] 81  Resp:  [18] 18  BP: (111-124)/(68-77) 114/70  SpO2:  [96 %-98 %] 98 %  250 lbs 0 oz    GENERAL: Alert and oriented. NAD. Conversational, appropriate.  Obese.  HEENT: Normocephalic. EOMI. No icterus or injection. Nares normal.   LUNGS: Decrement to bases.. No dyspnea at rest.   HEART: Regular rate. Extremities perfused.   ABDOMEN: Obese.  Soft, tender, and nondistended. Positive bowel sounds.  Suprapubic catheter.  Jejunostomy tube.  Abdominal drains in place.  One area of open drain site appears just right midline abdomen umbilicus.  EXTREMITIES: LE edema noted.   NEUROLOGIC: Moves extremities x4, spontaneously.  Follows commands.    Data   Data reviewed today:  I personally reviewed both laboratory and imaging data.   Recent Labs   Lab 01/25/20  2241 01/21/20  0555 01/19/20  0535   WBC 13.0* 15.0*  --    HGB 9.7* 9.0*  --    MCV 89 90  --     246  --     137 136   POTASSIUM 3.7 3.6 3.9   CHLORIDE 94 96 96   CO2 41* 40* 37*   BUN 27 25 28   CR 0.62 0.77 0.77   ANIONGAP 1* 1* 3   DORY 10.4* 9.8 9.3   * 175* 152*   ALBUMIN 1.8* 1.7*  --    PROTTOTAL 6.3*  --   --    BILITOTAL 0.3  --   --    ALKPHOS 148  --   --    ALT 22  --   --    AST 22  --   --    LIPASE 164  --   --        Recent Results (from the past 24 hour(s))   CT Abdomen Pelvis w Contrast    Narrative    EXAM: CT ABDOMEN PELVIS W CONTRAST  LOCATION: [facility]  DATE/TIME: 1/25/2020 11:31 PM    INDICATION: Abd infection (incl peritonitis)  COMPARISON: 01/11/2020  TECHNIQUE: CT scan of the abdomen and pelvis was performed following injection of IV contrast. Multiplanar reformats were obtained. Dose reduction techniques were used.  CONTRAST: 125mL Isovue-370    FINDINGS:   LOWER CHEST: Moderate bilateral pleural effusions and partially visualized lung base atelectasis, similar 2  prior. Dense calcifications along the wall of the left ventricle, presumed related to old infarct.    HEPATOBILIARY: The liver is normal in size and contour. Patent portal and hepatic veins. No focal lesion. The gallbladder is normal in size, with no stones. No biliary dilatation.    PANCREAS: Atrophic. No ductal dilatation. There is some fluid near the upper margin of the pancreatic head and neck which is not substantially changed.    SPLEEN: Normal.    ADRENAL GLANDS: Normal.    KIDNEYS/BLADDER: Heterogeneous enhancement pattern in the right kidney, suspect pyelonephritis. No hydronephrosis. Unchanged left upper pole simple renal cyst and lower pole simple cyst, for which no follow-up is needed. No stones in the urinary tract. A   suprapubic bladder catheter is present.    BOWEL: There is dense oral contrast within the rectum and distal colon. A percutaneous gastrostomy tube is noted in the distal stomach. There is adjacent gas and fluid-filled collection which appears larger and more walled off and rim-enhancing than   previous, suspicious for abscess. The previously noted abdominal collections in the left upper quadrant are also larger and now demonstrate more loculated appearance increased rim enhancement, likely developing abscesses. The left upper quadrant   collection is again noted to contain small amount of air. It measures about 14.5 x 5.1 cm on transverse imaging and 12.7 cm craniocaudal, along the lateral margin of the spleen and in the anterior left upper quadrant, communicating inferiorly with a   complex anterior omental gas and fluid containing collection which is surrounding the gastrostomy site. The anterior abdominal collection is difficult to measure due to the complex multiloculated appearance, however spans about 16 cm transverse diameter   image 111 series 4, and has an overall craniocaudal extent of about 12 cm on coronal image 13 series 5, noted to surround the anterior gastric antrum and  communicating with the left abdominal collection described above. There is some nonloculated,   nonrim-enhancing ascites around the liver.    There is a drain in the upper abdomen which enters the left upper quadrant and terminates in the right upper quadrant, lateral paracolic gutter near this cyst hepatic flexure. A separate right upper quadrant drain is seen which terminates immediately   inferior to the left hepatic medial segment and adjacent to the duodenal bulb, located within the complex ill-defined collection of air and fluid.    A percutaneous jejunostomy is noted in the central lower abdomen, without complication.    LYMPH NODES: Normal.    VASCULATURE: No areas of venous thrombosis. No aortic aneurysm.    PELVIC ORGANS: Hysterectomy    OTHER: Right lower quadrant fat-containing abdominal wall hernias are noted. A right periumbilical incision contains small foci of air and previously noted staples have been removed, possibly mildly dehiscent.    MUSCULOSKELETAL: No acute findings. Scoliosis and multilevel degenerative changes are noted in the spine.      Impression    IMPRESSION:   1.  Complex anterior upper abdominal fluid collections appear more loculated and rim-enhancing than previous, likely developing abscesses, with increased volume/size. There is probable communication with the left upper quadrant collection to the   multiloculated anterior epigastric collection which surrounds the entry site of the gastrostomy tube. The right upper quadrant drains do not appear to be located within these collections. There is still some gas and fluid around the right upper quadrant   drain which terminates near the duodenal bulb, but a well-formed abscess is not seen in this region.    2.  Increased ascites in the upper abdomen around the liver and above the pancreas, however no findings of acute pancreatitis.    3.  Pleural effusions and lung base atelectasis appear similar.    4.  Interval removal of skin  staples in abdominal wall incision which appears slightly dehiscent.    5.  Heterogeneous areas of right renal hypoenhancement. Correlate clinically for any evidence of pyelonephritis.    6.  Unremarkable suprapubic bladder catheter and percutaneous jejunostomy.    7.  Left ventricular wall calcifications are probably related to a previous infarct.

## 2020-01-26 NOTE — ED TRIAGE NOTES
"Pt's NH facility noted increased drainage around j/g-tube around 3pm, changed dressing at that time and then noted more drainage since then. Pt c/o back pain, tho she states that is \"normal\" for her.   "

## 2020-01-26 NOTE — PROGRESS NOTES
RECEIVING UNIT ED HANDOFF REVIEW    ED Nurse Handoff Report was reviewed by: Janki Guillen RN on January 26, 2020 at 1:47 AM

## 2020-01-26 NOTE — PROGRESS NOTES
SW:  D: Consult acknowledged, SW awaiting recommendations for discharge.   P: Will continue to follow.      SHIMON Lou

## 2020-01-26 NOTE — ED NOTES
"[facility]  ED Nurse Handoff Report    ED Chief complaint: Wound Check      ED Diagnosis:   Final diagnoses:   Intra-abdominal abscess (H)       Code Status: Full Code    Allergies:   Allergies   Allergen Reactions     Povidone Iodine      \"mild skin irritation\" per patient report     Toradol [Ketorolac] Other (See Comments)     Pt gets nightmares     Tramadol Other (See Comments)       Patient Story: Pt is a 64 female with hx of perforated duodenal ulcer with surgical repair on 1/1/20; tonight at care facility, staff noted increased drainage around g-tube/j-tube site and sent pt in. Pt c/o back and buttocks pain but states that those are not new for her.      Focused Assessment:  Pt alert and oriented, large amount of drainage noted to dressing sites. Pt c/o back pain, states she has been taking oxycodone for the pain. Pt denies n/v/d, denies CP/SOB. HX of 2L O2 at care facility, continues on O2 here, states her resp status is at baseline for her.     Treatments and/or interventions provided: IV/bloodwork, imaging, pain medication, IV abx  Patient's response to treatments and/or interventions: Good    To be done/followed up on inpatient unit:  Pain control/infection- pt requesting air mattress/bed    Does this patient have any cognitive concerns?: No    Activity level - Baseline/Home:  Total Care  Activity Level - Current:   Total Care    Patient's Preferred language: English   Needed?: No    Isolation: Contact   Infection: Not Applicable  MRSA  Bariatric?: Yes    Vital Signs:   Vitals:    01/25/20 2224 01/25/20 2253 01/26/20 0000 01/26/20 0030   BP: 114/74 124/68 111/77 114/70   Pulse:  74 75 73   Resp: 18      Temp: 98.9  F (37.2  C)      TempSrc: Oral      SpO2: 96%  97% 98%   Weight: 113.4 kg (250 lb)      Height: 1.651 m (5' 5\")          Cardiac Rhythm:     Was the PSS-3 completed:   Yes  What interventions are required if any?               Family Comments: No family present   OBS " brochure/video discussed/provided to patient/family: N/A              Name of person given brochure if not patient:               Relationship to patient:     For the majority of the shift this patient's behavior was Green.   Behavioral interventions performed were .    ED NURSE PHONE NUMBER: [phone]

## 2020-01-26 NOTE — CONSULTS
General Surgery  Pt with complicated abdominal process.  Admitted with abdomen pain and purulent drainage from around G-tube  CT shows two large abscess cavities  We will have IR drain these collections, okay to wait until 1/27  Continue with G-tube to gravity/suction  Daily dressing changes and re-packing of abdomen wound   May resume J-tube feeds  No plans for repeat surgery  Art Willingham MD  General Surgery, Office [phone]

## 2020-01-26 NOTE — PHARMACY-ADMISSION MEDICATION HISTORY
Pharmacy Medication History  Admission medication history interview status for the 1/25/2020  admission is complete. See EPIC admission navigator for prior to admission medications     Medication history sources: Patient and discharge summary as of 1/21/20  Medication history source reliability: Poor  Adherence assessment: Poor    Significant changes made to the medication list:  No significant changes made to the med list      Additional medication history information:   Patient appears present but somewhat lethargic. She notes she took all her medications last 1/25/20. Patient gives an impression of being a poor historian.    Medication reconciliation completed by provider prior to medication history? Yes    Time spent in this activity: 30min      Prior to Admission medications    Medication Sig Last Dose Taking? Auth Provider   albuterol (PROVENTIL) (2.5 MG/3ML) 0.083% neb solution Take 1 vial (2.5 mg) by nebulization every 4 hours as needed for shortness of breath / dyspnea or wheezing Past Week at Unknown time Yes Mike Weiner MD   amLODIPine (NORVASC) 5 MG tablet 1 tablet (5 mg) by Oral or Feeding Tube route daily 1/25/2020 at Unknown time Yes Khurram Livingston MD   bisacodyl (DULCOLAX) 10 MG suppository Place 1 suppository (10 mg) rectally daily as needed for constipation Past Week at Unknown time Yes Khurram Livingston MD   blood glucose monitoring (NO BRAND SPECIFIED) meter device kit Dispense freestyle zak glucose meter Past Week at Unknown time Yes Shawnee Dueñas,    blood glucose monitoring (NO BRAND SPECIFIED) test strip Use to test blood sugars 1 times daily or as directed Past Week at Unknown time Yes Shawnee Dueñas, DO   docusate sodium (COLACE) 100 MG capsule Take 2 capsules (200 mg) by mouth 2 times daily Past Week at prn Yes Khurram Livingston MD   DULoxetine (CYMBALTA) 60 MG capsule Take 120 mg by mouth every morning 1/25/2020 at Unknown time Yes Unknown,  Entered By History   furosemide (LASIX) 40 MG tablet Take 1 tablet (40 mg) by mouth daily 1/25/2020 at Unknown time Yes Khurram Livingston MD   gabapentin (NEURONTIN) 800 MG tablet Take 1,600 mg by mouth 3 times daily 1/25/2020 at Unknown time Yes Unknown, Entered By History   hypromellose-dextran (ARTIFICAL TEARS) 0.1-0.3 % ophthalmic solution Place 1 drop into both eyes 3 times daily as needed for dry eyes Past Week at Unknown time Yes Khurram Livingston MD   insulin aspart (NOVOLOG PEN) 100 UNIT/ML pen Inject 0-6 Units Subcutaneous 2 times daily Twice a day, for glucometer 150-200 give 2 units, 201-250 4 units, >250 6 units. 1/25/2020 at Unknown time Yes Khurram Livingston MD   insulin glargine (LANTUS PEN) 100 UNIT/ML pen Inject 27 Units Subcutaneous every morning (before breakfast) 1/25/2020 at Unknown time Yes Khurram Livingston MD   levalbuterol (XOPENEX) 1.25 MG/3ML neb solution TAKE ONE VIAL BY NEBULIZATION EVERY 8 HOURS AS NEEDED FOR SHORTNESS OF BREATH/DYSPNEA OR WHEEZING PRN at prn Yes Mike Weiner MD   levothyroxine (SYNTHROID/LEVOTHROID) 88 MCG tablet TAKE 1 TABLET(88 MCG) BY MOUTH DAILY 1/25/2020 at Unknown time Yes Shawnee Dueñas,    methadone (DOLOPHINE) 10 MG tablet Take 2 tablets (20 mg) by mouth 2 times daily Per Rx bottle take 2 tablets (20mg) in morning, 1 tablet (10mg) in afternoon, 2 tablets (20mg) in evening. 1/25/2020 at Unknown time Yes Khurram Livingston MD   miconazole (MICATIN/MICRO GUARD) 2 % external powder Apply topically 2 times daily as needed for other (topical candidiasis) Past Week at Unknown time Yes Khurram Livingston MD   oxyCODONE (ROXICODONE) 5 MG tablet Take 1 tablet (5 mg) by mouth every 6 hours as needed for moderate to severe pain Past Month at Unknown time Yes Khurram Livingston MD   pantoprazole (PROTONIX) 40 MG EC tablet Take 1 tablet (40 mg) by mouth 2 times daily (before meals) 1/25/2020 at Unknown time Yes  Khurram Livingston MD   polyethylene glycol (MIRALAX/GLYCOLAX) packet Take 17 g by mouth 2 times daily as needed (constipation) Past Week at Unknown time Yes Khurram Livingston MD   potassium chloride ER (K-DUR/KLOR-CON M) 20 MEQ CR tablet Take 1 tablet (20 mEq) by mouth daily Past Week at Unknown time Yes Khurram Livingston MD   senna-docusate (SENOKOT-S;PERICOLACE) 8.6-50 MG per tablet Take 2 tablets by mouth 2 times daily Past Week at Unknown time Yes Rosa Summers APRN CNP   simvastatin (ZOCOR) 20 MG tablet TAKE 1 TABLET(20 MG) BY MOUTH AT BEDTIME prn at prn Yes Shawnee Dueñas DO   traZODone (DESYREL) 100 MG tablet Take 100 mg by mouth At Bedtime prn at prn Yes Unknown, Entered By History   VENTOLIN  (90 Base) MCG/ACT inhaler INHALE 2 PUFFS INTO THE LUNGS EVERY 6 HOURS AS NEEDED FOR SHORTNESS OF BREATH prn at prn Yes Skylar Moore MD   blood glucose (NO BRAND SPECIFIED) lancets standard Use to test blood sugar 1 times daily or as directed.   Shawnee Dueñas DO   order for DME Equipment being ordered: Nebulizer DME at [facility]  Shawnee Dueñas DO   order for DME Equipment being ordered: BIPAP.   15/5, Rate of 12, 2L O2 to keep sats 90-95%.   Chloe Kenny APRN CNP   order for DME Equipment being ordered: Nebulizer DME at [facility] [place]  Skylar Moore MD   order for DME Equipment being ordered: Hospital Bed, total electric (head, foot, and height adjustments), with any type side rails, with mattress DME at Shawnee Castellon DO   order for DME Equipment being ordered: Motorized Wheelchair DME at Shawnee Castellon DO   order for DME Equipment being ordered: Trapeze bar for hospital bed DME at [facility]  Shawnee Dueñas DO

## 2020-01-26 NOTE — PHARMACY-VANCOMYCIN DOSING SERVICE
Pharmacy Vancomycin Initial Note  Date of Service 2020  Patient's  1955  64 year old, female    Indication: Intra-abdominal infection    Current estimated CrCl = Estimated Creatinine Clearance: 116.7 mL/min (based on SCr of 0.62 mg/dL).    Creatinine for last 3 days  2020: 10:41 PM Creatinine 0.62 mg/dL    Recent Vancomycin Level(s) for last 3 days  No results found for requested labs within last 72 hours.      Vancomycin IV Administrations (past 72 hours)                   vancomycin (VANCOCIN) 2,500 mg in sodium chloride 0.9 % 500 mL intermittent infusion (mg) 2,500 mg New Bag 20 0329                Nephrotoxins and other renal medications (From now, onward)    Start     Dose/Rate Route Frequency Ordered Stop    20 1500  vancomycin (VANCOCIN) 2,000 mg in sodium chloride 0.9 % 500 mL intermittent infusion      2,000 mg  over 2 Hours Intravenous EVERY 12 HOURS 20 0331      20 0800  piperacillin-tazobactam (ZOSYN) 3.375 g vial to attach to  mL bag     Note to Pharmacy:  Pharmacy can adjust dose based on renal function.    3.375 g  over 1 Hours Intravenous EVERY 6 HOURS 20 0259      20 0123  vancomycin (VANCOCIN) 2,500 mg in sodium chloride 0.9 % 500 mL intermittent infusion      2,500 mg  over 2 Hours Intravenous ONCE 20 0122            Contrast Orders - past 72 hours (72h ago, onward)    Start     Dose/Rate Route Frequency Ordered Stop    20 2332  iopamidol (ISOVUE-370) solution 125 mL      125 mL Intravenous ONCE 20 2332 20 2348                Plan:  1.  Start vancomycin  2000 mg IV q12h.   2.  Goal Trough Level: 15-20 mg/L   3.  Pharmacy will check trough levels as appropriate in 1-3 Days.    4. Serum creatinine levels will be ordered daily for the first week of therapy and at least twice weekly for subsequent weeks.    5. Stanton method utilized to dose vancomycin therapy: Method 1    Ismael Mg [facility]

## 2020-01-26 NOTE — PLAN OF CARE
A&Ox4, lethargic, sweaty at times. VSS on 1L NC. C/o back pain prn norco given x 3. PIV infusing IVF. L THAI to bulb suction. L SP cath patent. Malecot patent, OP adequate has redness around all tubes. G-tube to gravity, leaking copious amounts. J-tube clamped, leaking. All dressings on drains changed once had copious amount of thick tan color drainage. Midline wound w/ packing reddened w/ small opening, small drainage; dressing changed. Many small open area on buttocks, mepilex intact. Coccyx mepilex intact. No BM in 1 week, passing gas. Clears NPO at midnight. Up w/ lift, turn/repo q 2 hr. Plan for surgery tomorrow to place drains of fluid and abcesses.

## 2020-01-26 NOTE — ED PROVIDER NOTES
History   Chief Complaint:  Wound Check    HPI   Bhavani White is a 64 year old female, with a history of septic shock and MI, who presents to the ED for evaluation of a wound check. EMS reports the patient's GJ tube had yellow discharge leaking around the site this afternoon at 1500. They state the nursing home staff cleaned the site, however, two hours ago more discharge appeared around the site and the staff decided to call EMS. En route, the patient was placed on 4L O2 and was complaining of back pain, 8/10, which is chronic. Her blood sugar in route was 141. The patient states this is the same pain she has been dealing with. She states she noticed the drainage today.    Allergies:  Povidone Iodine  Toradol  Tramadol    Medications:    Albuterol  Norvasc  Dulcolax  Colace  Cymbalta  Lasix  Gabapentin  Lantus pen  Xopenex  Levothyroxine  Dolophine  Protonix  Miralax  Senna-docusate  Zocor  Desyrel    Past Medical History:    Anxiety  Asthma  Depression  DM II  Frequent UTI  Hypertension  Hypothyroid  Latrogenic Cushing's disease  Morbid obesity  MRSA Positive  Osteoarthritis  Sleep apnea  Chronic back pain  NSTEMI  Septic shock    Past Surgical History:    Abdomen surgery  Laminectomy, facetectomy  Left knee arthroplasty  Suprapubic catheter insertion  Discectomy, fusion cervical anterior three+ levels  Gastrostomy tube insertion  Hernia repair x2  Hysterectomy  Ulcer perforation repair  Right eye cataract removal  Left carpal tunnel release    Family History:    Depression  Hypertension  CABG  Ankylosing spondylitis  Bladder cancer    Social History:  Smoking status: Never  Alcohol use: Never  Drug use: Yes- Medical cannabis  PCP: [facility]  Marital Status:       Review of Systems   Musculoskeletal: Positive for back pain.   Skin: Positive for wound.   All other systems reviewed and are negative.      Physical Exam     Patient Vitals for the past 24 hrs:   BP Temp Temp src Pulse  "Heart Rate Resp SpO2 Height Weight   01/26/20 0130 120/79 -- -- 73 -- -- 98 % -- --   01/26/20 0100 110/69 -- -- 71 -- -- 98 % -- --   01/26/20 0030 114/70 -- -- 73 -- -- 98 % -- --   01/26/20 0000 111/77 -- -- 75 -- -- 97 % -- --   01/25/20 2253 124/68 -- -- 74 -- -- -- -- --   01/25/20 2224 114/74 98.9  F (37.2  C) Oral -- 81 18 96 % 1.651 m (5' 5\") 113.4 kg (250 lb)     Physical Exam  General: Well-nourished, high BMI  Eyes: PERRL, conjunctivae pink no scleral icterus or conjunctival injection  ENT:  Moist mucus membranes, posterior oropharynx clear without erythema or exudates  Respiratory:  Lungs clear to auscultation bilaterally, no crackles/rubs/wheezes.  Good air movement  CV: Normal rate and rhythm, no murmurs/rubs/gallops  GI:  Abdomen soft and protuberant.  4 drains in place.  The superior drain has milky purulent material draining around it. Yellodrainage around Normoactive BS.  Mild diffuse tenderness, no guarding or rebound  Skin: Warm, dry.  No rashes or petechiae  Musculoskeletal: No peripheral edema or calf tenderness  Neuro: Alert and oriented to person/place/time  Psychiatric: Flat affect      Emergency Department Course   Imaging:  Radiographic findings were communicated with the patient who voiced understanding of the findings.    CT Abd/pelvis with contrast:  IMPRESSION:   1.  Complex anterior upper abdominal fluid collections appear more loculated and rim-enhancing than previous, likely developing abscesses, with increased volume/size. There is probable communication with the left upper quadrant collection to the   multiloculated anterior epigastric collection which surrounds the entry site of the gastrostomy tube. The right upper quadrant drains do not appear to be located within these collections. There is still some gas and fluid around the right upper quadrant   drain which terminates near the duodenal bulb, but a well-formed abscess is not seen in this region.  2.  Increased ascites in " the upper abdomen around the liver and above the pancreas, however no findings of acute pancreatitis.  3.  Pleural effusions and lung base atelectasis appear similar.  4.  Interval removal of skin staples in abdominal wall incision which appears slightly dehiscent.  5.  Heterogeneous areas of right renal hypoenhancement. Correlate clinically for any evidence of pyelonephritis.  6.  Unremarkable suprapubic bladder catheter and percutaneous jejunostomy.  7.  Left ventricular wall calcifications are probably related to a previous infarct.    Imaging independently reviewed and agree with radiologist interpretation.     Laboratory:  Blood cultures: pending x2  Urine Culture: pending  Wound culture: pending    CBC: WBC: 13.0 (H), HGB: 9.7 (L), PLT: 336  CMP: Glucose: 144 (H), CO2: 41 (H), Anion Gap: 1 (L), Calcium: 10.4 (H), Albumin: 1.8 (L), Protein Total: 6.3 (L), o/w WNL (Creatinine: 0.62)    Lipase: 164    UA: Protein Albumin: 10, leukocyte esterase: moderate, WBC/HPF: 17 (H), o/w Negative    2241 Lactic: 0.8    ABO/Rh: B, pos    Interventions:  2248 NS 1,000 mLs IV  2252 Morphine 4 mg IV  2331 Morphine 4 mg IV  0134 Zosyn 4.5 g IV    Emergency Department Course:  Nursing notes and vitals reviewed. (11:30 PM) I performed an exam of the patient as documented above.     IV inserted. Blood drawn. This was sent to the lab for further testing, results above.     Medicine administered as documented above.    The patient was sent for CT while in the emergency department, results above.     I spoke with Dr. Mora of the surgery team.    0122 I consulted with Dr. Guillen of the hospitalist services. They are in agreement to accept the patient for admission.     0130 I rechecked the patient and discussed the results of his and her workup thus far.     Findings and plan explained to the Patient who consents to admission. Discussed the patient with Dr. Guillen, who will admit the patient to a [facility] bed for further monitoring,  evaluation, and treatment.    Impression & Plan      Medical Decision Making:  Bhavani White is a 64 year old female who comes with drainage of purulent material around her drain site.  She does not appear to have severe sepsis.  She is afebrile with a normal heart rate and normal lactic acid.  Laboratory studies were otherwise unremarkable.  CT scan showed findings of a developing abscess not being drained by the current drains.  We covered her with Zosyn and Vanco.  I spoke with Dr. Mora on-call for surgery and he recommended the plan for admission.  They will see if the drains can be adjusted at the bedside and what further treatment she will need.  Dr. Justin Guillen graciously agreed to admit the patient.    Diagnosis:    ICD-10-CM    1. Intra-abdominal abscess (H) K65.1      Disposition:  Admitted to [facility].    Scribe Disclosure:  Michael ENCARNACION, am serving as a scribe at 10:16 PM on 1/25/2020 to document services personally performed by Sally Harris MD based on my observations and the provider's statements to me.    Scribe Disclosure:  John ENCARNACION, am serving as a scribe at 11:30 PM on 1/25/2020 to document services personally performed by Sally Harris MD based on my observations and the provider's statements to me.     1/25/2020    EMERGENCY DEPARTMENT     Sally Harris MD  01/26/20 0805

## 2020-01-27 ENCOUNTER — APPOINTMENT (OUTPATIENT)
Dept: CT IMAGING | Facility: CLINIC | Age: 65
DRG: 372 | End: 2020-01-27
Attending: SURGERY
Payer: MEDICARE

## 2020-01-27 LAB
CREAT SERPL-MCNC: 0.65 MG/DL (ref 0.52–1.04)
ERYTHROCYTE [DISTWIDTH] IN BLOOD BY AUTOMATED COUNT: 14.9 % (ref 10–15)
GFR SERPL CREATININE-BSD FRML MDRD: >90 ML/MIN/{1.73_M2}
GLUCOSE BLDC GLUCOMTR-MCNC: 100 MG/DL (ref 70–99)
GLUCOSE BLDC GLUCOMTR-MCNC: 104 MG/DL (ref 70–99)
GLUCOSE BLDC GLUCOMTR-MCNC: 112 MG/DL (ref 70–99)
GLUCOSE BLDC GLUCOMTR-MCNC: 119 MG/DL (ref 70–99)
GLUCOSE BLDC GLUCOMTR-MCNC: 89 MG/DL (ref 70–99)
GLUCOSE BLDC GLUCOMTR-MCNC: 90 MG/DL (ref 70–99)
GRAM STN SPEC: ABNORMAL
HCT VFR BLD AUTO: 34.6 % (ref 35–47)
HGB BLD-MCNC: 10.2 G/DL (ref 11.7–15.7)
INR PPP: 1.18 (ref 0.86–1.14)
MAGNESIUM SERPL-MCNC: 1.9 MG/DL (ref 1.6–2.3)
MCH RBC QN AUTO: 26.2 PG (ref 26.5–33)
MCHC RBC AUTO-ENTMCNC: 29.5 G/DL (ref 31.5–36.5)
MCV RBC AUTO: 89 FL (ref 78–100)
PLATELET # BLD AUTO: 352 10E9/L (ref 150–450)
RBC # BLD AUTO: 3.89 10E12/L (ref 3.8–5.2)
SPECIMEN SOURCE: ABNORMAL
SPECIMEN SOURCE: ABNORMAL
VANCOMYCIN SERPL-MCNC: 14.4 MG/L
WBC # BLD AUTO: 9.3 10E9/L (ref 4–11)

## 2020-01-27 PROCEDURE — C1729 CATH, DRAINAGE: HCPCS

## 2020-01-27 PROCEDURE — 94640 AIRWAY INHALATION TREATMENT: CPT

## 2020-01-27 PROCEDURE — 27210437 ZZH NUTRITION PRODUCT SEMIELEM INTERMED LITER

## 2020-01-27 PROCEDURE — 87106 FUNGI IDENTIFICATION YEAST: CPT | Performed by: HOSPITALIST

## 2020-01-27 PROCEDURE — 83735 ASSAY OF MAGNESIUM: CPT | Performed by: HOSPITALIST

## 2020-01-27 PROCEDURE — 87186 SC STD MICRODIL/AGAR DIL: CPT | Performed by: HOSPITALIST

## 2020-01-27 PROCEDURE — 25000132 ZZH RX MED GY IP 250 OP 250 PS 637: Mod: GY | Performed by: HOSPITALIST

## 2020-01-27 PROCEDURE — 0W9F30Z DRAINAGE OF ABDOMINAL WALL WITH DRAINAGE DEVICE, PERCUTANEOUS APPROACH: ICD-10-PCS | Performed by: RADIOLOGY

## 2020-01-27 PROCEDURE — 25000125 ZZHC RX 250: Performed by: NURSE PRACTITIONER

## 2020-01-27 PROCEDURE — 00000146 ZZHCL STATISTIC GLUCOSE BY METER IP

## 2020-01-27 PROCEDURE — 36415 COLL VENOUS BLD VENIPUNCTURE: CPT | Performed by: HOSPITALIST

## 2020-01-27 PROCEDURE — 12000000 ZZH R&B MED SURG/OB

## 2020-01-27 PROCEDURE — 36415 COLL VENOUS BLD VENIPUNCTURE: CPT | Performed by: NURSE PRACTITIONER

## 2020-01-27 PROCEDURE — 25000125 ZZHC RX 250: Performed by: HOSPITALIST

## 2020-01-27 PROCEDURE — 40000863 ZZH STATISTIC RADIOLOGY XRAY, US, CT, MAR, NM

## 2020-01-27 PROCEDURE — 87070 CULTURE OTHR SPECIMN AEROBIC: CPT | Performed by: HOSPITALIST

## 2020-01-27 PROCEDURE — G0463 HOSPITAL OUTPT CLINIC VISIT: HCPCS

## 2020-01-27 PROCEDURE — 85027 COMPLETE CBC AUTOMATED: CPT | Performed by: HOSPITALIST

## 2020-01-27 PROCEDURE — 80202 ASSAY OF VANCOMYCIN: CPT | Performed by: HOSPITALIST

## 2020-01-27 PROCEDURE — 94640 AIRWAY INHALATION TREATMENT: CPT | Mod: 76

## 2020-01-27 PROCEDURE — 99153 MOD SED SAME PHYS/QHP EA: CPT

## 2020-01-27 PROCEDURE — 40000901 ZZH STATISTIC WOC PT EDUCATION, 0-15 MIN

## 2020-01-27 PROCEDURE — 99152 MOD SED SAME PHYS/QHP 5/>YRS: CPT

## 2020-01-27 PROCEDURE — 87181 SC STD AGAR DILUTION PER AGT: CPT | Performed by: HOSPITALIST

## 2020-01-27 PROCEDURE — 25000128 H RX IP 250 OP 636: Performed by: NURSE PRACTITIONER

## 2020-01-27 PROCEDURE — 25000128 H RX IP 250 OP 636: Performed by: HOSPITALIST

## 2020-01-27 PROCEDURE — 25800030 ZZH RX IP 258 OP 636: Performed by: HOSPITALIST

## 2020-01-27 PROCEDURE — 40000275 ZZH STATISTIC RCP TIME EA 10 MIN

## 2020-01-27 PROCEDURE — 87075 CULTR BACTERIA EXCEPT BLOOD: CPT | Performed by: HOSPITALIST

## 2020-01-27 PROCEDURE — 25000132 ZZH RX MED GY IP 250 OP 250 PS 637: Mod: GY | Performed by: INTERNAL MEDICINE

## 2020-01-27 PROCEDURE — 85610 PROTHROMBIN TIME: CPT | Performed by: NURSE PRACTITIONER

## 2020-01-27 PROCEDURE — 99233 SBSQ HOSP IP/OBS HIGH 50: CPT | Performed by: INTERNAL MEDICINE

## 2020-01-27 PROCEDURE — 87077 CULTURE AEROBIC IDENTIFY: CPT | Performed by: HOSPITALIST

## 2020-01-27 PROCEDURE — 87205 SMEAR GRAM STAIN: CPT | Performed by: HOSPITALIST

## 2020-01-27 PROCEDURE — 82565 ASSAY OF CREATININE: CPT | Performed by: HOSPITALIST

## 2020-01-27 RX ORDER — NICOTINE POLACRILEX 4 MG
15-30 LOZENGE BUCCAL
Status: CANCELLED | OUTPATIENT
Start: 2020-01-27

## 2020-01-27 RX ORDER — PANTOPRAZOLE SODIUM 40 MG/1
40 TABLET, DELAYED RELEASE ORAL
Status: DISCONTINUED | OUTPATIENT
Start: 2020-01-27 | End: 2020-01-28 | Stop reason: ALTCHOICE

## 2020-01-27 RX ORDER — LIDOCAINE HYDROCHLORIDE 10 MG/ML
30 INJECTION, SOLUTION EPIDURAL; INFILTRATION; INTRACAUDAL; PERINEURAL ONCE
Status: DISCONTINUED | OUTPATIENT
Start: 2020-01-27 | End: 2020-01-28

## 2020-01-27 RX ORDER — FENTANYL CITRATE 50 UG/ML
25-50 INJECTION, SOLUTION INTRAMUSCULAR; INTRAVENOUS EVERY 5 MIN PRN
Status: DISCONTINUED | OUTPATIENT
Start: 2020-01-27 | End: 2020-01-27

## 2020-01-27 RX ORDER — NALOXONE HYDROCHLORIDE 0.4 MG/ML
.1-.4 INJECTION, SOLUTION INTRAMUSCULAR; INTRAVENOUS; SUBCUTANEOUS
Status: DISCONTINUED | OUTPATIENT
Start: 2020-01-27 | End: 2020-01-27

## 2020-01-27 RX ORDER — SIMVASTATIN 20 MG
20 TABLET ORAL AT BEDTIME
Status: DISCONTINUED | OUTPATIENT
Start: 2020-01-27 | End: 2020-01-28

## 2020-01-27 RX ORDER — GABAPENTIN 800 MG/1
1600 TABLET ORAL 3 TIMES DAILY
Status: DISCONTINUED | OUTPATIENT
Start: 2020-01-27 | End: 2020-01-28

## 2020-01-27 RX ORDER — DEXTROSE MONOHYDRATE 25 G/50ML
25-50 INJECTION, SOLUTION INTRAVENOUS
Status: CANCELLED | OUTPATIENT
Start: 2020-01-27

## 2020-01-27 RX ORDER — TRAZODONE HYDROCHLORIDE 100 MG/1
100 TABLET ORAL
Status: DISCONTINUED | OUTPATIENT
Start: 2020-01-27 | End: 2020-02-02

## 2020-01-27 RX ORDER — FLUMAZENIL 0.1 MG/ML
0.2 INJECTION, SOLUTION INTRAVENOUS
Status: DISCONTINUED | OUTPATIENT
Start: 2020-01-27 | End: 2020-01-27

## 2020-01-27 RX ORDER — DEXTROSE MONOHYDRATE 100 MG/ML
INJECTION, SOLUTION INTRAVENOUS CONTINUOUS PRN
Status: DISCONTINUED | OUTPATIENT
Start: 2020-01-27 | End: 2020-02-12 | Stop reason: HOSPADM

## 2020-01-27 RX ORDER — METHADONE HYDROCHLORIDE 10 MG/1
20 TABLET ORAL 2 TIMES DAILY
Status: DISCONTINUED | OUTPATIENT
Start: 2020-01-27 | End: 2020-01-28 | Stop reason: ALTCHOICE

## 2020-01-27 RX ORDER — LEVALBUTEROL INHALATION SOLUTION 1.25 MG/3ML
1.25 SOLUTION RESPIRATORY (INHALATION) EVERY 6 HOURS PRN
Status: DISCONTINUED | OUTPATIENT
Start: 2020-01-27 | End: 2020-01-27

## 2020-01-27 RX ORDER — AMLODIPINE BESYLATE 5 MG/1
5 TABLET ORAL DAILY
Status: DISCONTINUED | OUTPATIENT
Start: 2020-01-27 | End: 2020-02-12 | Stop reason: HOSPADM

## 2020-01-27 RX ORDER — LEVOTHYROXINE SODIUM 88 UG/1
88 TABLET ORAL
Status: DISCONTINUED | OUTPATIENT
Start: 2020-01-28 | End: 2020-01-28

## 2020-01-27 RX ORDER — DULOXETIN HYDROCHLORIDE 60 MG/1
120 CAPSULE, DELAYED RELEASE ORAL EVERY MORNING
Status: DISCONTINUED | OUTPATIENT
Start: 2020-01-28 | End: 2020-02-12 | Stop reason: HOSPADM

## 2020-01-27 RX ADMIN — SODIUM CHLORIDE: 9 INJECTION, SOLUTION INTRAVENOUS at 11:21

## 2020-01-27 RX ADMIN — GABAPENTIN 1600 MG: 800 TABLET, FILM COATED ORAL at 17:02

## 2020-01-27 RX ADMIN — PANTOPRAZOLE SODIUM 40 MG: 40 TABLET, DELAYED RELEASE ORAL at 17:02

## 2020-01-27 RX ADMIN — PIPERACILLIN AND TAZOBACTAM 3.38 G: 3; .375 INJECTION, POWDER, LYOPHILIZED, FOR SOLUTION INTRAVENOUS at 13:09

## 2020-01-27 RX ADMIN — HYDROMORPHONE HYDROCHLORIDE 0.2 MG: 1 INJECTION, SOLUTION INTRAMUSCULAR; INTRAVENOUS; SUBCUTANEOUS at 11:45

## 2020-01-27 RX ADMIN — MIDAZOLAM HYDROCHLORIDE 0.5 MG: 1 INJECTION, SOLUTION INTRAMUSCULAR; INTRAVENOUS at 09:56

## 2020-01-27 RX ADMIN — HYDROMORPHONE HYDROCHLORIDE 0.2 MG: 1 INJECTION, SOLUTION INTRAMUSCULAR; INTRAVENOUS; SUBCUTANEOUS at 08:42

## 2020-01-27 RX ADMIN — VANCOMYCIN HYDROCHLORIDE 2000 MG: 5 INJECTION, POWDER, LYOPHILIZED, FOR SOLUTION INTRAVENOUS at 22:55

## 2020-01-27 RX ADMIN — METHADONE HYDROCHLORIDE 20 MG: 10 TABLET ORAL at 20:26

## 2020-01-27 RX ADMIN — AMLODIPINE BESYLATE 5 MG: 5 TABLET ORAL at 14:40

## 2020-01-27 RX ADMIN — FENTANYL CITRATE 50 MCG: 50 INJECTION, SOLUTION INTRAMUSCULAR; INTRAVENOUS at 09:45

## 2020-01-27 RX ADMIN — PIPERACILLIN AND TAZOBACTAM 3.38 G: 3; .375 INJECTION, POWDER, LYOPHILIZED, FOR SOLUTION INTRAVENOUS at 02:27

## 2020-01-27 RX ADMIN — SODIUM CHLORIDE: 9 INJECTION, SOLUTION INTRAVENOUS at 21:55

## 2020-01-27 RX ADMIN — HYDROMORPHONE HYDROCHLORIDE 0.2 MG: 1 INJECTION, SOLUTION INTRAMUSCULAR; INTRAVENOUS; SUBCUTANEOUS at 04:04

## 2020-01-27 RX ADMIN — GABAPENTIN 1600 MG: 800 TABLET, FILM COATED ORAL at 21:53

## 2020-01-27 RX ADMIN — PIPERACILLIN AND TAZOBACTAM 3.38 G: 3; .375 INJECTION, POWDER, LYOPHILIZED, FOR SOLUTION INTRAVENOUS at 20:26

## 2020-01-27 RX ADMIN — MIDAZOLAM HYDROCHLORIDE 0.5 MG: 1 INJECTION, SOLUTION INTRAMUSCULAR; INTRAVENOUS at 10:38

## 2020-01-27 RX ADMIN — ALBUTEROL SULFATE 2.5 MG: 2.5 SOLUTION RESPIRATORY (INHALATION) at 16:09

## 2020-01-27 RX ADMIN — MIDAZOLAM HYDROCHLORIDE 1 MG: 1 INJECTION, SOLUTION INTRAMUSCULAR; INTRAVENOUS at 09:46

## 2020-01-27 RX ADMIN — HYDROCODONE BITARTRATE AND ACETAMINOPHEN 2 TABLET: 5; 325 TABLET ORAL at 02:45

## 2020-01-27 RX ADMIN — MIDAZOLAM HYDROCHLORIDE 1 MG: 1 INJECTION, SOLUTION INTRAMUSCULAR; INTRAVENOUS at 10:31

## 2020-01-27 RX ADMIN — PIPERACILLIN AND TAZOBACTAM 3.38 G: 3; .375 INJECTION, POWDER, LYOPHILIZED, FOR SOLUTION INTRAVENOUS at 08:42

## 2020-01-27 RX ADMIN — SIMVASTATIN 20 MG: 20 TABLET, FILM COATED ORAL at 21:53

## 2020-01-27 RX ADMIN — HYDROMORPHONE HYDROCHLORIDE 0.2 MG: 1 INJECTION, SOLUTION INTRAMUSCULAR; INTRAVENOUS; SUBCUTANEOUS at 00:30

## 2020-01-27 RX ADMIN — HYDROCODONE BITARTRATE AND ACETAMINOPHEN 2 TABLET: 5; 325 TABLET ORAL at 13:09

## 2020-01-27 RX ADMIN — ALBUTEROL SULFATE 2.5 MG: 2.5 SOLUTION RESPIRATORY (INHALATION) at 21:55

## 2020-01-27 RX ADMIN — FENTANYL CITRATE 25 MCG: 50 INJECTION, SOLUTION INTRAMUSCULAR; INTRAVENOUS at 10:22

## 2020-01-27 RX ADMIN — FENTANYL CITRATE 25 MCG: 50 INJECTION, SOLUTION INTRAMUSCULAR; INTRAVENOUS at 09:56

## 2020-01-27 RX ADMIN — MIDAZOLAM HYDROCHLORIDE 0.5 MG: 1 INJECTION, SOLUTION INTRAMUSCULAR; INTRAVENOUS at 10:23

## 2020-01-27 RX ADMIN — FENTANYL CITRATE 25 MCG: 50 INJECTION, SOLUTION INTRAMUSCULAR; INTRAVENOUS at 10:38

## 2020-01-27 RX ADMIN — FENTANYL CITRATE 50 MCG: 50 INJECTION, SOLUTION INTRAMUSCULAR; INTRAVENOUS at 10:30

## 2020-01-27 RX ADMIN — HYDROCODONE BITARTRATE AND ACETAMINOPHEN 2 TABLET: 5; 325 TABLET ORAL at 17:02

## 2020-01-27 RX ADMIN — LIDOCAINE HYDROCHLORIDE 27 ML: 10 INJECTION, SOLUTION EPIDURAL; INFILTRATION; INTRACAUDAL; PERINEURAL at 10:44

## 2020-01-27 RX ADMIN — ALBUTEROL SULFATE 2.5 MG: 2.5 SOLUTION RESPIRATORY (INHALATION) at 03:00

## 2020-01-27 ASSESSMENT — ACTIVITIES OF DAILY LIVING (ADL)
ADLS_ACUITY_SCORE: 27
ADLS_ACUITY_SCORE: 26
ADLS_ACUITY_SCORE: 27

## 2020-01-27 ASSESSMENT — MIFFLIN-ST. JEOR: SCORE: 1773.88

## 2020-01-27 NOTE — PROGRESS NOTES
Care Suites Procedure Nursing Note    Patient Information  Name: Bhavani White  Age: 64 year old    Procedure  MD arrived to explain procedure to patient and obtain consent. Sedation Exam completed at this time. 0944- Time Out done.    Procedure: Abscess Drain: Drain placed into left upper quadrant of abdomen by Dr Washington. Approximately 10 ml cloudy fluid removed & specimen sent to lab. Connected to THAI bulb - full suction. Stay Fix drsg applied & tube secured. Then MD moved to right side, drain placed into right upper quadrant of abdomen. Approximately 10 ml of cloudy similar appearing fluid removed and sent to lab. Connected to THAI bulb - full suction. Stay Fix drsg applied & tube secured. Total sedation given - 175 mcg Fentanyl and 3.5 mg Versed IV in divided doses while 2L NC O2 in place. Pt tolerated procedure well with stable vital signs throughout. Pt returned back to room 808-1  per cart & NA transport with IVF infusing. Report called to primary RN.     Procedure start time: 0940  Procedure complete time: 1052  Concerns/abnormal assessment: infected looking fluid removed  If abnormal assessment, provider notified: Yes MD present  Plan/Other: return to inpatient room    Neva Stanford, RN

## 2020-01-27 NOTE — CONSULTS
"ADDENDUM:  Received consult for TF to resume.    TF orders completed in Epic as rec below.      CLINICAL NUTRITION SERVICES  -  ASSESSMENT NOTE      Future/Additional Recommendations:     When ready to resume TF, rec:  Type of Feeding Tube: J-tube  Enteral Frequency:  Continuous  Enteral Regimen: Peptamen Intense VHP at 60 mL/hr  Total Enteral Provisions: 1440 kcal (12 kcal/kg), 132 g protein (2.5 g/kg), 1210 mL H2O, 7 g fiber, 109 g CHO, 96% RDI    Rec 60 mL water flushes every 4 hrs (while on IVF)     Malnutrition:   % Weight Loss:  None noted  % Intake:  No decreased intake noted (has been receiving TF)  Subcutaneous Fat Loss:  None observed  Muscle Loss:  None observed  Fluid Retention:  None noted    Malnutrition Diagnosis: Patient does not meet two of the above criteria necessary for diagnosing malnutrition         REASON FOR ASSESSMENT  Bhavani White is a 64 year old female seen by Registered Dietitian for Admission Nutrition Risk Screen for tube feeding or parenteral nutrition      NUTRITION HISTORY  Chart reviewed  Pt is known to me from recent admit - last visited with her on 1/20/20  Pt was on Peptamen Intense VHP at 60 mL/hr  Also on a clear liquid diet - taking very small amounts of po    Visited with pt this morning  Tells me that she has been receiving clear liquids at the TCU - \"I get 3 meals per day and have been doing well\"  She dislikes clear liquid supplements  Per TCU transfer sheets, pt has been on Peptamen Intense VHP at 60 mL/hr  Water flushes of 250 mL BID      CURRENT NUTRITION ORDERS  Diet Order:     NPO         NUTRITION FOCUSED PHYSICAL ASSESSMENT FOR DIAGNOSING MALNUTRITION)  Yes             Observed:    No nutrition-related physical findings observed    Obtained from Chart/Interdisciplinary Team:  GJ tube - leaking around tube site  (G-tube for drainage, J-tube for nutrition)  No edema  CT does show complex mass close to the abdominal wall     ANTHROPOMETRICS  Height: 5' " "5\"  Weight:(1/27) 121.8 kg / 268 lbs 8.32 oz  Body mass index is 44.68 kg/m .  Weight Status:  Obesity Grade III BMI >40  IBW: 56.8 kg  % IBW: 214%  Weight History:  Pt reports wt has been stable - she is not sure of her \"usual body wt\"  Wt Readings from Last 10 Encounters:   01/27/20 121.8 kg (268 lb 8.3 oz)   01/21/20 93 kg (205 lb 0.4 oz)   11/01/19 113.4 kg (250 lb)   10/28/19 121.7 kg (268 lb 3.2 oz)   08/29/19 117.5 kg (259 lb)   03/15/19 117.5 kg (259 lb)   03/11/19 117.7 kg (259 lb 8 oz)   12/13/18 115.2 kg (254 lb)   09/21/18 108 kg (238 lb)   08/15/18 108 kg (238 lb)     Vitals:     12/29/19 1428 12/30/19 0345 12/31/19 0235 01/01/20 0400   Weight: 140 kg (308 lb 10.3 oz) 138.8 kg (306 lb) 122 kg (268 lb 15.4 oz) 124 kg (273 lb 5.9 oz)     01/02/20 0400 01/03/20 0415 01/04/20 0000 01/05/20 0000   Weight: 127 kg (279 lb 15.8 oz) 130 kg (286 lb 9.6 oz) 131 kg (288 lb 12.8 oz) 135 kg (297 lb 9.9 oz)     01/07/20 0034 01/08/20 0400 01/12/20 0636 01/14/20 0644   Weight: (!) 167 kg (368 lb 2.7 oz) (!) 168 kg (370 lb 6 oz) (!) 170 kg (374 lb 12.5 oz) (!) 168.7 kg (371 lb 14.7 oz)     01/16/20 0500 01/19/20 0605   Weight: (!) 155 kg (341 lb 11.4 oz) 92.9 kg (204 lb 12.9 oz)         LABS  BGM: 104, 100     MEDICATIONS  Medium SSI  IVF at 100 mL/hr    ASSESSED NUTRITION NEEDS PER APPROVED PRACTICE GUIDELINES:    Dosing Weight 121.8 kg (1/27 wt for energy), 56.8 kg (IBW for protein)  Estimated Energy Needs: 7816-4568 kcals (11-14 Kcal/Kg)  Justification: obese  Estimated Protein Needs: 115-145 grams protein (2-2.5 g pro/Kg)  Justification: hypercatabolism with acute illness and obesity guidelines   Estimated Fluid Needs: 3942-3613  mL (1 mL/Kcal)  Justification: maintenance    MALNUTRITION:  % Weight Loss:  None noted  % Intake:  No decreased intake noted (has been receiving TF)  Subcutaneous Fat Loss:  None observed  Muscle Loss:  None observed  Fluid Retention:  None noted    Malnutrition Diagnosis: Patient does not " meet two of the above criteria necessary for diagnosing malnutrition      NUTRITION DIAGNOSIS:  Inadequate protein-energy intake related to current NPO status as evidenced by pt meeting 0% est needs      NUTRITION INTERVENTIONS  Recommendations / Nutrition Prescription  NPO    When ready to resume TF, rec:  Type of Feeding Tube: J-tube  Enteral Frequency:  Continuous  Enteral Regimen: Peptamen Intense VHP at 60 mL/hr  Total Enteral Provisions: 1440 kcal (12 kcal/kg), 132 g protein (2.5 g/kg), 1210 mL H2O, 7 g fiber, 109 g CHO, 96% RDI    Rec 60 mL water flushes every 4 hrs (while on IVF)        Implementation  Nutrition education ---> Reviewed NPO diet order  Available for TF orders as needed  .      Nutrition Goals  Pt to receive nutrition in the next 48 hrs  .      MONITORING AND EVALUATION:  Progress towards goals will be monitored and evaluated per protocol and Practice Guidelines

## 2020-01-27 NOTE — PLAN OF CARE
Pt is A&OX4- lethargic and sweaty at times. VSS on 2 L NC. C/o of pain in buttock, controlled with norco, dilaudid and repositioning. PIV infusing NS w/ intermittent abx. L THAI to bulb suction- no output. L SP cath patent w/ adequate OP. G-tube, leaking at times to gravity. Dressing reinforced. J-tube clamped. Midline incision covered by drain dressings. Malecot drain patent w/ minimal OP.  Tele SR w/ BBB. Open areas on buttocks- mepliex in place. Up with lift. Repos q2hrs- pt requesting to be repositioned frequently d/t pain. Pt c/o of buttock and abdominal fullness, PRN norco given x2, PRN dilaudid given x3. PIV infusing NS w/ intermittent abx. UA growing Pseudomonas aeruginosa- FYI given to MD. No BM in 1 week per pt, but passing flatus. NPO since midnight. Plan for surgery to place drains today. Slept between cares.

## 2020-01-27 NOTE — PLAN OF CARE
Patient is A/ox4, flat affect. VSS on 2L except hypertensive. Tele SR w/ BBB. Patient continues to c/o upper back, buttock, and abdominal pain, PRN IV Dilaudid given x2 and PRN PO Norco given x1, PTA methadone ordered today for long acting pain control, first dose to be given at bedtime, patient repositioned frequently throughout shift as well. LS diminished. Patient c/o shortness of breath at rest in bed, patient on continuous pulse ox stating 90-94% on 2L. Coccyx and buttock excoriated, reddened and blanchable, but intact - patient assessed by WO nurse today - mepilex dressings applied by WOC nurse to bottom, all CDI. Placement of LUQ and RUQ THAI drains today for abdominal abscesses, draining thick, milky output. Mid abdominal THAI drain with minimal output. G tube to gravity. J tube clamped. Suprapubic catheter patent with yellow output. All dressing to drain sites changed today by WOC nurse and CDI. R midline wound cleanses with Vashe by WOC nurse.  R PIV infusing NS at 100mL/hr with intermittent abx. Bariatric Pulsate mattress ordered. Plan to continue with IV abx and possibly start J feedings? Discharge back to [facility] pending. Nursing to continue to monitor.

## 2020-01-27 NOTE — PHARMACY-VANCOMYCIN DOSING SERVICE
Pharmacy Vancomycin Note  Date of Service 2020  Patient's  1955   64 year old, female    Indication: Intra-abdominal infection  Goal Trough Level: 15-20 mg/L  Day of Therapy: 2  Current Vancomycin regimen:  2500 mg IV once then intermittent dosing.    Current estimated CrCl = Estimated Creatinine Clearance: 116.2 mL/min (based on SCr of 0.65 mg/dL).    Creatinine for last 3 days  2020: 10:41 PM Creatinine 0.62 mg/dL  2020:  8:21 AM Creatinine 0.65 mg/dL    Recent Vancomycin Levels (past 3 days)  2020:  2:28 PM Vancomycin Level 19.1 mg/L    Vancomycin IV Administrations (past 72 hours)                   vancomycin (VANCOCIN) 2,500 mg in sodium chloride 0.9 % 500 mL intermittent infusion (mg) 2,500 mg New Bag 20 0329                Nephrotoxins and other renal medications (From now, onward)    Start     Dose/Rate Route Frequency Ordered Stop    20 1715  vancomycin (VANCOCIN) 2,000 mg in sodium chloride 0.9 % 500 mL intermittent infusion      2,000 mg  over 2 Hours Intravenous ONCE 20 1713      20 0924  vancomycin place ortega - receiving intermittent dosing      1 each Intravenous SEE ADMIN INSTRUCTIONS 20 0924      20 0800  piperacillin-tazobactam (ZOSYN) 3.375 g vial to attach to  mL bag     Note to Pharmacy:  Pharmacy can adjust dose based on renal function.    3.375 g  over 1 Hours Intravenous EVERY 6 HOURS 20 0259               Contrast Orders - past 72 hours (72h ago, onward)    Start     Dose/Rate Route Frequency Ordered Stop    20 2332  iopamidol (ISOVUE-370) solution 125 mL      125 mL Intravenous ONCE 20 2332 20 2348          Interpretation of levels and current regimen:  Random level is  Therapeutic    Has serum creatinine changed > 50% in last 72 hours: No    Urine output:  adequate    Renal Function: Stable    Plan:  1.  Redose Vanco 2g IV x1 & continue intermittent dosing.  2.  Pharmacy will check trough  levels as appropriate in 1 Day.      Thu Hinojosa formerly [facility]        .

## 2020-01-27 NOTE — PROGRESS NOTES
[facility]    Hospitalist Progress Note    Assessment & Plan   Bhavani White is a 64 year old female who was admitted on 1/25/2020.  Patient had a long stay at [facility] and was discharged on 21 January, reported back on 26th May due to increased abdominal abscess.  Complex anterior upper abdominal fluid collections, recent discharge on January 21, 2020 status post perforated duodenal ulcer status post exploratory laparotomy primary closure delayed on January 1, 2020: Patient with recent discharge on January 21 of 2020 for a perforated duodenal ulcer and associated hemorrhagic shock and bowel ischemia status post exploratory laparotomy with drain placement and wound VAC.  Per report, patient with GJ tube and yellow discharge leaking around the site this afternoon prior to admission.  However, staff at facility noted more discharge and decided to call EMS, patient sent to the emergency department for further evaluation and treatment.  CT on admission shows complex anterior upper abdominal fluid collections more loculated than previous, likely developing abscesses with increased volume, communication with left upper quadrant collection to the multiloculated anterior epigastric collection, increased ascites, pleural effusions, suprapubic bladder catheter and percutaneous jejunostomy, see report for further details.  -Appreciate surgery input, consulted interventional radiology, she underwent drain placement today 1/27  -Continue IV antibiotics, on Vanco and Zosyn, will request infectious disease input .   restarted tube feeding and her PTA meds..   right renal hypoenhancement  --To monitor, findings noted in CT scan done here.  Cardiac, hyperlipidemia, hypertension: Patient maintained on statin prior to admission.  Previous echocardiogram with EF at 60 to 65%, right ventricular global function probably normal, per report.  -Continue simvastatin, amlodipine and Lasix depending on fluid  status     Diabetes mellitus, insulin-dependent: Patient maintained on Lantus prior to admission.  -Continue prior to admission Lantus 27 units subcutaneous every morning when verified by pharmacy.  -Insulin sliding scale, medium.     Hypothyroidism: Stable.  -Continue prior to admission levothyroxine      Chronic pain, on chronic methadone, anxiety, depression: Patient maintained on methadone prior to admission.  -PTA medications were restarted including gabapentin, methadone and Cymbalta.     GERD: Stable.  -Continue prior to admission pantoprazole when verified by pharmacy.     Insomnia: Stable.  -Continue prior to admission trazodone when verified by pharmacy.        DVT Prophylaxis: sequential compression device   Code Status: Full Code     Disposition: Expected discharge in 2-3 days     Grisel Chicas MD  Text Page   (7am to 6pm)    Interval History   Patient was called in the morning, visited her later, she had multiple drains placed by IR, she was n.p.o. overnight, plan to restart G-tube feeding today.    -Data reviewed today: I reviewed all new labs and imaging results over the last 24 hours.     Physical Exam   Temp: 96.1  F (35.6  C) Temp src: Oral BP: (!) 147/77 Pulse: 82 Heart Rate: 86 Resp: 16 SpO2: 99 % O2 Device: Nasal cannula Oxygen Delivery: 2 LPM  Vitals:    01/25/20 2224 01/26/20 0502 01/27/20 0600   Weight: 113.4 kg (250 lb) 122.2 kg (269 lb 6.4 oz) 121.8 kg (268 lb 8.3 oz)     Vital Signs with Ranges  Temp:  [95.5  F (35.3  C)-97.6  F (36.4  C)] 96.1  F (35.6  C)  Pulse:  [70-87] 82  Heart Rate:  [67-86] 86  Resp:  [16-18] 16  BP: (109-162)/(60-87) 147/77  SpO2:  [95 %-99 %] 99 %  I/O last 3 completed shifts:  In: 3136 [P.O.:480; I.V.:2656]  Out: 1451 [Urine:1300; Emesis/NG output:145; Drains:6]    Constitutional: Awake, alert, cooperative, no apparent distress  Respiratory: Basilar crackles noted  Cardiovascular: Regular rate and rhythm, normal S1 and S2, and no murmur noted  GI: G-tube noted  suprapubic catheter noted, she has healing incision, has no THAI drains placed by IR, bowel sounds are reduced.  Skin/Integumen: No rashes, no cyanosis, 1+ edema  Neuro : moving all 4 extremities, no focal deficit noted     Medications     sodium chloride 100 mL/hr at 01/27/20 1121       amLODIPine  5 mg Oral or Feeding Tube Daily     [START ON 1/28/2020] DULoxetine  120 mg Oral QAM     gabapentin  1,600 mg Oral TID     insulin aspart  1-6 Units Subcutaneous Q4H     [START ON 1/28/2020] insulin glargine  27 Units Subcutaneous QAM AC     [START ON 1/28/2020] levothyroxine  88 mcg Oral QAM AC     lidocaine (PF)  30 mL Subcutaneous Once     lidocaine 2%-EPINEPHrine 1:100,000  1 mL Intradermal Once     methadone  20 mg Oral BID     pantoprazole  40 mg Oral BID AC     piperacillin-tazobactam  3.375 g Intravenous Q6H     simvastatin  20 mg Oral At Bedtime     sodium chloride (PF)  3 mL Intracatheter Q8H     vancomycin place ortega - receiving intermittent dosing  1 each Intravenous See Admin Instructions       Data   Recent Labs   Lab 01/27/20  0826 01/27/20  0746 01/26/20  0821 01/25/20  2241 01/21/20  0555   WBC  --  9.3 11.5* 13.0* 15.0*   HGB  --  10.2* 9.8* 9.7* 9.0*   MCV  --  89 90 89 90   PLT  --  352 342 336 246   INR 1.18*  --   --   --   --    NA  --   --  138 136 137   POTASSIUM  --   --  4.0 3.7 3.6   CHLORIDE  --   --  97 94 96   CO2  --   --  41* 41* 40*   BUN  --   --  25 27 25   CR  --  0.65 0.65 0.62 0.77   ANIONGAP  --   --  <1* 1* 1*   DORY  --   --  10.2* 10.4* 9.8   GLC  --   --  105* 144* 175*   ALBUMIN  --   --  1.7* 1.8* 1.7*   PROTTOTAL  --   --  5.8* 6.3*  --    BILITOTAL  --   --  0.3 0.3  --    ALKPHOS  --   --  136 148  --    ALT  --   --  20 22  --    AST  --   --  22 22  --    LIPASE  --   --   --  164  --      Recent Labs   Lab 01/27/20  1231 01/27/20  0839 01/27/20  0458 01/27/20  0020 01/26/20 2017 01/26/20  0821  01/25/20  2241 01/21/20  0555   GLC  --   --   --   --   --   --  105*  --   144* 175*   BGM 90 89 100* 104* 111*   < >  --    < >  --   --     < > = values in this interval not displayed.       Imaging:   Recent Results (from the past 24 hour(s))   CT Abdomen Peritoneum Abscess Drainage    Narrative    CT-GUIDED RIGHT UPPER AND LEFT UPPER QUADRANT DRAINS  1/27/2020 11:00  AM    MEDICATIONS/SEDATION TIME: 27 1% lidocaine locally, 3.5 mg Versed, 175  mcg fentanyl, Given by Neva Dixon RN. Throughout the procedure, the  patient was monitored by a radiology nurse for cardiac rhythm and  oxygen saturation which remained stable.    CONTRAST: None.    COMPLICATIONS: No immediate.    HISTORY: Patient has a history of perforated duodenal ulcers.  Underwent exploratory laparotomy and repair with surgical drain  placement in early January. Presented purulent drainage from a  gastrostomy tube site. Of note the patient has both a gastrostomy tube  and a jejunostomy tube. CT scan performed 1/25/2020 demonstrated  interval progression in the size of the right upper quadrant and left  upper quadrant fluid collections. Both collections may communicate  with a multiloculated phlegmonous collection in the lower midline  abdomen.    PROCEDURE AND FINDINGS:  Following a discussion of the risks,  benefits, indications and alternatives to treatment, appropriate  informed consent was obtained.  The patient was brought to the  interventional radiology suite and placed supine on the table. The  anterior abdomen was prepped and draped in a sterile fashion.  A time  out was performed per universal protocol policy to ensure correct  patient, site and procedure to be performed. Radiation dose for this  scan was reduced using automated exposure control, adjustment of the  mA and/or kV according to patient size, or iterative reconstruction  technique.     5 mm unenhanced images were obtained through the region of the  superior abdomen, images showed right upper and left upper quadrant  fluid collections. A suitable route  for percutaneous drainage was then  chosen. The overlying skin was prepped and draped in a sterile manner.  1% lidocaine was used for local anesthesia. Under CT guidance,  18-gauge trocar needle was advanced into the left upper quadrant  collection initially. 10 mL of purulent  fluid was aspirated and sent  to lab for culture. A 0.035 wire was advanced through the catheter and  exchange was made for a 10 Maldivian pigtail drain. The pigtail was  locked and connected to THAI bulb. The drain was secured to the skin  with an adhesive device. A sterile dressing was applied.     Then a second 18-gauge needle was advanced into the right upper  quadrant collection. 10 mL of purulent fluid was aspirated and sent to  the laboratory. An 035 guidewire was advanced through the needle. The  tract was serially dilated, and a 10 Maldivian drain was advanced over  the wire. The pigtail loop was engaged. The catheter was secured to  the skin with an adhesive device.     Moderate sedation:  The patient was administered titrated doses of  Versed and fentanyl intravenously during the procedure. Throughout the  procedure, the patient was monitored by a radiology nurse for cardiac  rhythm and oxygen saturation which remained stable. Total sedation  time was 30. The patient tolerated the procedure well and left  interventional radiology in stable condition.      Impression    IMPRESSION:  1. CT-guided placement of two separate drains; the first in a left  upper quadrant fluid collection, and the second in a right upper  quadrant fluid collection.  2. Purulent fluid was aspirated from each drain.  3. The upper quadrant fluid collections likely communicate with a  loculated, phlegmonous collection in the more inferior midline  abdomen.  4. Recommend obtaining repeat CT scan after drainage output is  decreased to less than 20 cc.

## 2020-01-27 NOTE — PRE-PROCEDURE
GENERAL PRE-PROCEDURE:   Procedure:  Image guided abdominal drain placements with intravenous moderate sedation   Date/Time:  1/27/2020 8:45 AM    Written consent obtained?: Yes    Risks and benefits: Risks, benefits and alternatives were discussed    Consent given by:  Patient  Patient states understanding of procedure being performed: Yes    Patient's understanding of procedure matches consent: Yes    Procedure consent matches procedure scheduled: Yes    Expected level of sedation:  Moderate  Appropriately NPO:  Yes  ASA Class:  Class 3- Severe systemic disease, definite functional limitations  Mallampati  :  Grade 2- soft palate, base of uvula, tonsillar pillars, and portion of posterior pharyngeal wall visible  Lungs:  Lungs clear with good breath sounds bilaterally and other (comment)  Lung exam comment:  Decreased in bases  Heart:  Normal heart sounds and rate  History & Physical reviewed:  History and physical reviewed and no updates needed  Statement of review:  I have reviewed the lab findings, diagnostic data, medications, and the plan for sedation

## 2020-01-27 NOTE — PROGRESS NOTES
CHANELLE  I: CHANELLE called [facility] Liaison and left a message re: bed hold status. CHANELLE requested a c/b.     P: SW will continue to follow and assist as needed.    SARAH Hernandez, LGSW  [phone]  [facility]

## 2020-01-27 NOTE — PROCEDURES
[facility]    Procedure: Drain  Date/Time: 1/27/2020 10:56 AM  Performed by: Polly Washington MD  Authorized by: Polly Washington MD     UNIVERSAL PROTOCOL   Site Marked: Yes  Prior Images Obtained and Reviewed:  Yes  Required items: Required blood products, implants, devices and special equipment available    Patient identity confirmed:  Verbally with patient  Patient was reevaluated immediately before administering moderate or deep sedation or anesthesia  Confirmation Checklist:  Patient's identity using two indicators, relevant allergies, procedure was appropriate and matched the consent or emergent situation and correct equipment/implants were available  Time out: Immediately prior to the procedure a time out was called    Universal Protocol: the Joint Commission Universal Protocol was followed    Preparation: Patient was prepped and draped in usual sterile fashion          SEDATION    Patient Sedated: Yes    Sedation Type:  Moderate (conscious) sedation  Vital signs: Vital signs monitored during sedation    See dictated procedure note for full details.  PROCEDURE   Patient Tolerance:  Patient tolerated the procedure well with no immediate complications  Describe Procedure: Right upper quadrant and left upper quadrant drains placed under CT  aspirated 20 ml of grossly purulent fluid from each site and sent to Micro  Length of time physician/provider present for 1:1 monitoring during sedation: 30

## 2020-01-27 NOTE — PROGRESS NOTES
"General Surgery Progress Note    Assessment and Plan:  64 year old female with complex anterior fluid collections, UTI  Perforated duodenal ulcer, s/p ext lap, TRUONG, Malecot drain placement with perforation, and wound VAC on 2019. Repeat ex-lap, TRUONG, closure of duodenal perforation over Malecot, G-tube/J-tube placements, delayed primary closure on 2020    - IR placed drains  - WBC 9.3, on Zosyn and Vanc  - Skin at incision excised under local anesthetic (2.5cc 1% lidocaine)  - WOC Rn consulted, appreciate your help  - Continue all drains  - Med mngt per hospitalist, appreciate your help    Interval Hx:   Sore at wound    Exam:  Vitals: Blood pressure (!) 162/87, pulse 79, temperature 95.5  F (35.3  C), temperature source Oral, resp. rate 16, height 1.651 m (5' 5\"), weight 121.8 kg (268 lb 8.3 oz), SpO2 97 %, not currently breastfeeding.  Temperature Temp  Av.4  F (35.8  C)  Min: 95.5  F (35.3  C)  Max: 97.6  F (36.4  C)   I/O last 3 completed shifts:  In: 3136 [P.O.:480; I.V.:2656]  Out: 1451 [Urine:1300; Emesis/NG output:145; Drains:6]    Abd: soft, nt, nd. Incision open in multiple locations with tunneling and very thin skin bridges. G tube venting, J tube feeds going, Malecot bilious, THAI drain serous, IR drains purulent drainage.    Data:    Recent Labs   Lab Test 20  0746 20  0821 20  2241   WBC 9.3 11.5* 13.0*   HGB 10.2* 9.8* 9.7*   HCT 34.6* 33.1* 32.2*    342 336      Recent Labs   Lab Test 20  0746 20  0821 20  2241 20  0555   NA  --  138 136 137   POTASSIUM  --  4.0 3.7 3.6   CHLORIDE  --  97 94 96   CO2  --  41* 41* 40*   BUN  --  25 27 25   CR 0.65 0.65 0.62 0.77         Esther Bonilla PA-C  [facility]  [phone]  "

## 2020-01-27 NOTE — PROGRESS NOTES
MD Notification    Notified Person: MD    Notified Person Name: Juan Francisco    Notification Date/Time: 1/27/20 9811    Notification Interaction: Web page    Purpose of Notification: Meds have been reconciled by pharmacy, please order PTA meds. Please order tube feeding    Orders Received: PTA medications ordered. No order placed for J-tube feedings    Comments:

## 2020-01-27 NOTE — PROCEDURES
[facility]    Procedure: Drain   Date/Time: 1/27/2020 10:53 AM  Performed by: Polly Washington MD  Authorized by: Polly Washington MD     UNIVERSAL PROTOCOL   Site Marked: Yes  Prior Images Obtained and Reviewed:  Yes  Required items: Required blood products, implants, devices and special equipment available    Patient identity confirmed:  Verbally with patient  Patient was reevaluated immediately before administering moderate or deep sedation or anesthesia  Confirmation Checklist:  Patient's identity using two indicators, relevant allergies, procedure was appropriate and matched the consent or emergent situation and correct equipment/implants were available  Time out: Immediately prior to the procedure a time out was called    Universal Protocol: the Joint Commission Universal Protocol was followed    Preparation: Patient was prepped and draped in usual sterile fashion          SEDATION    Patient Sedated: Yes    Sedation Type:  Moderate (conscious) sedation  Vital signs: Vital signs monitored during sedation    See dictated procedure note for full details.  PROCEDURE   Patient Tolerance:  Patient tolerated the procedure well with no immediate complications    Length of time physician/provider present for 1:1 monitoring during sedation: 30

## 2020-01-28 LAB
CREAT SERPL-MCNC: 0.56 MG/DL (ref 0.52–1.04)
GFR SERPL CREATININE-BSD FRML MDRD: >90 ML/MIN/{1.73_M2}
GLUCOSE BLDC GLUCOMTR-MCNC: 137 MG/DL (ref 70–99)
GLUCOSE BLDC GLUCOMTR-MCNC: 141 MG/DL (ref 70–99)
GLUCOSE BLDC GLUCOMTR-MCNC: 149 MG/DL (ref 70–99)
GLUCOSE BLDC GLUCOMTR-MCNC: 159 MG/DL (ref 70–99)
GLUCOSE BLDC GLUCOMTR-MCNC: 161 MG/DL (ref 70–99)
GLUCOSE BLDC GLUCOMTR-MCNC: 175 MG/DL (ref 70–99)
VANCOMYCIN SERPL-MCNC: 17.6 MG/L

## 2020-01-28 PROCEDURE — 36415 COLL VENOUS BLD VENIPUNCTURE: CPT | Performed by: HOSPITALIST

## 2020-01-28 PROCEDURE — 25000125 ZZHC RX 250: Performed by: HOSPITALIST

## 2020-01-28 PROCEDURE — 25800030 ZZH RX IP 258 OP 636

## 2020-01-28 PROCEDURE — 25800030 ZZH RX IP 258 OP 636: Performed by: HOSPITALIST

## 2020-01-28 PROCEDURE — 99232 SBSQ HOSP IP/OBS MODERATE 35: CPT | Performed by: HOSPITALIST

## 2020-01-28 PROCEDURE — 25000132 ZZH RX MED GY IP 250 OP 250 PS 637: Mod: GY | Performed by: INTERNAL MEDICINE

## 2020-01-28 PROCEDURE — 25000128 H RX IP 250 OP 636

## 2020-01-28 PROCEDURE — 40000275 ZZH STATISTIC RCP TIME EA 10 MIN

## 2020-01-28 PROCEDURE — 80202 ASSAY OF VANCOMYCIN: CPT | Performed by: HOSPITALIST

## 2020-01-28 PROCEDURE — 12000000 ZZH R&B MED SURG/OB

## 2020-01-28 PROCEDURE — 94640 AIRWAY INHALATION TREATMENT: CPT | Mod: 76

## 2020-01-28 PROCEDURE — 25000128 H RX IP 250 OP 636: Performed by: HOSPITALIST

## 2020-01-28 PROCEDURE — 00000146 ZZHCL STATISTIC GLUCOSE BY METER IP

## 2020-01-28 PROCEDURE — 25000131 ZZH RX MED GY IP 250 OP 636 PS 637: Mod: GY | Performed by: HOSPITALIST

## 2020-01-28 PROCEDURE — 27210437 ZZH NUTRITION PRODUCT SEMIELEM INTERMED LITER

## 2020-01-28 PROCEDURE — 94640 AIRWAY INHALATION TREATMENT: CPT

## 2020-01-28 PROCEDURE — 25000132 ZZH RX MED GY IP 250 OP 250 PS 637: Mod: GY

## 2020-01-28 PROCEDURE — 25000131 ZZH RX MED GY IP 250 OP 636 PS 637: Mod: GY | Performed by: INTERNAL MEDICINE

## 2020-01-28 PROCEDURE — 82565 ASSAY OF CREATININE: CPT | Performed by: HOSPITALIST

## 2020-01-28 RX ORDER — LEVOTHYROXINE SODIUM 88 UG/1
88 TABLET ORAL
Status: DISCONTINUED | OUTPATIENT
Start: 2020-01-29 | End: 2020-02-12 | Stop reason: HOSPADM

## 2020-01-28 RX ORDER — SIMVASTATIN 20 MG
20 TABLET ORAL AT BEDTIME
Status: DISCONTINUED | OUTPATIENT
Start: 2020-01-28 | End: 2020-02-12 | Stop reason: HOSPADM

## 2020-01-28 RX ORDER — METHADONE HYDROCHLORIDE 10 MG/ML
20 CONCENTRATE ORAL 2 TIMES DAILY
Status: DISCONTINUED | OUTPATIENT
Start: 2020-01-28 | End: 2020-02-12 | Stop reason: HOSPADM

## 2020-01-28 RX ORDER — ALBUTEROL SULFATE 90 UG/1
2 AEROSOL, METERED RESPIRATORY (INHALATION) EVERY 4 HOURS PRN
Status: DISCONTINUED | OUTPATIENT
Start: 2020-01-28 | End: 2020-02-12 | Stop reason: HOSPADM

## 2020-01-28 RX ORDER — GABAPENTIN 800 MG/1
1600 TABLET ORAL 3 TIMES DAILY
Status: DISCONTINUED | OUTPATIENT
Start: 2020-01-28 | End: 2020-02-12 | Stop reason: HOSPADM

## 2020-01-28 RX ORDER — PANTOPRAZOLE SODIUM 40 MG/1
40 TABLET, DELAYED RELEASE ORAL
Status: DISCONTINUED | OUTPATIENT
Start: 2020-01-28 | End: 2020-02-12 | Stop reason: HOSPADM

## 2020-01-28 RX ORDER — IPRATROPIUM BROMIDE AND ALBUTEROL SULFATE 2.5; .5 MG/3ML; MG/3ML
3 SOLUTION RESPIRATORY (INHALATION)
Status: DISCONTINUED | OUTPATIENT
Start: 2020-01-28 | End: 2020-02-12 | Stop reason: HOSPADM

## 2020-01-28 RX ORDER — ALBUTEROL SULFATE 0.83 MG/ML
2.5 SOLUTION RESPIRATORY (INHALATION)
Status: DISCONTINUED | OUTPATIENT
Start: 2020-01-28 | End: 2020-02-12 | Stop reason: HOSPADM

## 2020-01-28 RX ORDER — METHADONE HYDROCHLORIDE 10 MG/1
20 TABLET ORAL 2 TIMES DAILY
Status: DISCONTINUED | OUTPATIENT
Start: 2020-01-28 | End: 2020-02-12 | Stop reason: HOSPADM

## 2020-01-28 RX ADMIN — TRAZODONE HYDROCHLORIDE 100 MG: 100 TABLET ORAL at 22:30

## 2020-01-28 RX ADMIN — INSULIN GLARGINE 27 UNITS: 100 INJECTION, SOLUTION SUBCUTANEOUS at 08:14

## 2020-01-28 RX ADMIN — INSULIN ASPART 1 UNITS: 100 INJECTION, SOLUTION INTRAVENOUS; SUBCUTANEOUS at 04:06

## 2020-01-28 RX ADMIN — INSULIN ASPART 1 UNITS: 100 INJECTION, SOLUTION INTRAVENOUS; SUBCUTANEOUS at 16:38

## 2020-01-28 RX ADMIN — GABAPENTIN 1600 MG: 800 TABLET, FILM COATED ORAL at 16:14

## 2020-01-28 RX ADMIN — PIPERACILLIN AND TAZOBACTAM 3.38 G: 3; .375 INJECTION, POWDER, LYOPHILIZED, FOR SOLUTION INTRAVENOUS at 02:23

## 2020-01-28 RX ADMIN — INSULIN ASPART 1 UNITS: 100 INJECTION, SOLUTION INTRAVENOUS; SUBCUTANEOUS at 21:30

## 2020-01-28 RX ADMIN — INSULIN ASPART 1 UNITS: 100 INJECTION, SOLUTION INTRAVENOUS; SUBCUTANEOUS at 08:15

## 2020-01-28 RX ADMIN — AMLODIPINE BESYLATE 5 MG: 5 TABLET ORAL at 08:19

## 2020-01-28 RX ADMIN — IPRATROPIUM BROMIDE AND ALBUTEROL SULFATE 3 ML: .5; 3 SOLUTION RESPIRATORY (INHALATION) at 11:52

## 2020-01-28 RX ADMIN — PIPERACILLIN AND TAZOBACTAM 3.38 G: 3; .375 INJECTION, POWDER, LYOPHILIZED, FOR SOLUTION INTRAVENOUS at 22:23

## 2020-01-28 RX ADMIN — ALBUTEROL SULFATE 2.5 MG: 2.5 SOLUTION RESPIRATORY (INHALATION) at 08:10

## 2020-01-28 RX ADMIN — HYDROMORPHONE HYDROCHLORIDE 0.2 MG: 1 INJECTION, SOLUTION INTRAMUSCULAR; INTRAVENOUS; SUBCUTANEOUS at 22:24

## 2020-01-28 RX ADMIN — PIPERACILLIN AND TAZOBACTAM 3.38 G: 3; .375 INJECTION, POWDER, LYOPHILIZED, FOR SOLUTION INTRAVENOUS at 13:38

## 2020-01-28 RX ADMIN — DULOXETINE HYDROCHLORIDE 120 MG: 60 CAPSULE, DELAYED RELEASE ORAL at 08:19

## 2020-01-28 RX ADMIN — GABAPENTIN 1600 MG: 800 TABLET, FILM COATED ORAL at 08:18

## 2020-01-28 RX ADMIN — HYDROMORPHONE HYDROCHLORIDE 0.2 MG: 1 INJECTION, SOLUTION INTRAMUSCULAR; INTRAVENOUS; SUBCUTANEOUS at 16:13

## 2020-01-28 RX ADMIN — VANCOMYCIN HYDROCHLORIDE 2000 MG: 1 INJECTION, POWDER, LYOPHILIZED, FOR SOLUTION INTRAVENOUS at 19:24

## 2020-01-28 RX ADMIN — SIMVASTATIN 20 MG: 20 TABLET, FILM COATED ORAL at 21:30

## 2020-01-28 RX ADMIN — PIPERACILLIN AND TAZOBACTAM 3.38 G: 3; .375 INJECTION, POWDER, LYOPHILIZED, FOR SOLUTION INTRAVENOUS at 08:12

## 2020-01-28 RX ADMIN — IPRATROPIUM BROMIDE AND ALBUTEROL SULFATE 3 ML: .5; 3 SOLUTION RESPIRATORY (INHALATION) at 22:02

## 2020-01-28 RX ADMIN — PANTOPRAZOLE SODIUM 40 MG: 40 TABLET, DELAYED RELEASE ORAL at 16:14

## 2020-01-28 RX ADMIN — METHADONE HYDROCHLORIDE 20 MG: 10 TABLET ORAL at 21:30

## 2020-01-28 RX ADMIN — PANTOPRAZOLE SODIUM 40 MG: 40 TABLET, DELAYED RELEASE ORAL at 06:34

## 2020-01-28 RX ADMIN — HYDROMORPHONE HYDROCHLORIDE 0.2 MG: 1 INJECTION, SOLUTION INTRAMUSCULAR; INTRAVENOUS; SUBCUTANEOUS at 20:23

## 2020-01-28 RX ADMIN — METHADONE HYDROCHLORIDE 20 MG: 10 TABLET ORAL at 08:18

## 2020-01-28 RX ADMIN — IPRATROPIUM BROMIDE AND ALBUTEROL SULFATE 3 ML: .5; 3 SOLUTION RESPIRATORY (INHALATION) at 15:49

## 2020-01-28 RX ADMIN — ALBUTEROL SULFATE 2.5 MG: 2.5 SOLUTION RESPIRATORY (INHALATION) at 03:57

## 2020-01-28 RX ADMIN — INSULIN ASPART 1 UNITS: 100 INJECTION, SOLUTION INTRAVENOUS; SUBCUTANEOUS at 12:50

## 2020-01-28 RX ADMIN — GABAPENTIN 1600 MG: 800 TABLET, FILM COATED ORAL at 21:30

## 2020-01-28 RX ADMIN — SODIUM CHLORIDE: 9 INJECTION, SOLUTION INTRAVENOUS at 11:17

## 2020-01-28 RX ADMIN — HYDROMORPHONE HYDROCHLORIDE 0.2 MG: 1 INJECTION, SOLUTION INTRAMUSCULAR; INTRAVENOUS; SUBCUTANEOUS at 09:06

## 2020-01-28 RX ADMIN — LEVOTHYROXINE SODIUM 88 MCG: 88 TABLET ORAL at 06:34

## 2020-01-28 ASSESSMENT — ACTIVITIES OF DAILY LIVING (ADL)
ADLS_ACUITY_SCORE: 27
ADLS_ACUITY_SCORE: 27
ADLS_ACUITY_SCORE: 26
ADLS_ACUITY_SCORE: 27
ADLS_ACUITY_SCORE: 26
ADLS_ACUITY_SCORE: 26

## 2020-01-28 NOTE — PROGRESS NOTES
"[facility]  WO Nurse Inpatient Wound Assessment   Reason for consultation: Evaluate and treat: Abdominal Incision    Assessment    Open abd incision  Treatment Plan  Plan of care open abdominal incision: change dressing Daily   1. Clean incision with MicroKlenz  2. Cut approx 12\" of 1/2\" plain Iodoform packing gauze  3. Dampen with VASHE. (blue bottle) Squeeze out excess  4. Using Q-tip pack into tunnel @ noon and pack moving top to bottom of wound bed  5. Cover with 4 x 4 folded in half  6. Secure with Medipore tape     All tube site: change on odd days and prn  1. Gently clean with MicroKlenz  2. Apply drain sponges to each tube without a stabilizer  3. Secure with Medipore tape      Orders In Epic  WO Nurse follow-up plan: weekly   Nursing to notify the Provider(s) and re-consult the [facility] Nurse if wound(s) deteriorates or new skin concern.    Patient History  According to provider note(s):  Bhavani White is a 64 year old female with hx of DM2, chronic cystitis s/p chronic SP catheter, chronic pain on methadone maintenance, asthma, ANIYA, and hypothyroidism who was admitted to the ICU service on 12/29/2019 for a perforated duodenal ulcer with associated septic shock and bowel ischemia. She is s/p ex-lap x2, but perforation could not be entirely repaired due to size; she is s/p THAI drains and GJ tube placement. Hospital course has otherwise been complicated by stress cardiomyopathy. She was weaned off pressors on 1/5 and successfully extubated on 1/6. She was transferred the the Hospitalist service on 1/6.      Acute metabolic encephalopathy, Improving  Multifactorial due to hemorrhagic/septic shock, ICU delirium. She was started on scheduled Seroquel while in the ICU which was discontinued with improvement in her mental status   - Currently alert and oriented  - Seroquel 25 mg TID PRN for anxiety, agitation     Perforated duodenal ulcer with hemorrhagic shock and bowel ischemia, s/p ex lap, TRUONG, " Malecot drain placement in perforation, and wound VAC on 12/29/2019, repeat ex-lap, TRUONG, closure of duodenal perforation over Malecot, G-tube/J-tube placements, and delayed primary closure on 1/1/2020    Re-admit 1-  Pt with complicated abdominal process.  Admitted with abdomen pain and purulent drainage from around G-tube  CT shows two large abscess cavities  We will have IR drain these collections, okay to wait until 1/27  Continue with G-tube to gravity/suction  Daily dressing changes and re-packing of abdomen wound   May resume J-tube feeds  No plans for repeat surgery  Art Willingham MD    1-27-20 Esther Bonilla PA-C excised skin bridges @ bedside. See surgery note    Objective Data  Containment of urine/stool: SP for UIC and Incontinence protocol for [facility]    Active Diet Order: TF to resume when medically indicated  Orders Placed This Encounter      Clear Liquid Diet    Output:   I/O last 3 completed shifts:  In: 3016 [P.O.:360; I.V.:2656]  Out: 1628 [Urine:1025; Emesis/NG output:270; Drains:333]    Risk Assessment:   Sensory Perception: 3-->slightly limited  Moisture: 3-->occasionally moist  Activity: 2-->chairfast  Mobility: 2-->very limited  Nutrition: 2-->probably inadequate  Friction and Shear: 1-->problem  Joe Score: 13                          Labs:   Recent Labs   Lab 01/27/20  0826 01/27/20  0746 01/26/20  0821   ALBUMIN  --   --  1.7*   HGB  --  10.2* 9.8*   INR 1.18*  --   --    WBC  --  9.3 11.5*       Physical Exam  Abdominal Inspection:    Rotund but soft.  Incision open in multiple locations with tunneling and very thin skin bridges.  Incision wound bed with 100% yellow slough and scattered thick purulent looking yellow drainage.    G tube venting, J tube feeds going, Malecot bilious, THAI drain serous, IR drains purulent drainage    1-27-29: Esther Bonilla excised skin bridges @ bedside. See surgery note      WOC Photo: 1-27-29 abdomen with drains        WOC Photo: 1-27-20  Open abd incision with  skin bridges      Size:  12.0cm x 1.5cm with 100% yellow slough   Wound bed: large mid-skin bridge  Tunnelin-7cm @ 12 o'clock  Drainage: scattered thick yellow over wound bed  Odor: none  Pain: Denies    Interventions  Current support surface Bariatric pulsate - ordered  Current off-loading measures: pillows / chair cushion  Visual inspection of wound(s) completed  Wound Care: Vashe packing to opened incision and dressing to all tube sites (execpt new sites)   Supplies: In pt room   Education provided: Discussed importance of repositioning and HOB below 30 degrees if medically indicated      Maty Epstein, RN  CWOC Nurse

## 2020-01-28 NOTE — CONSULTS
[facility]    Infectious Disease Consultation     Date of Admission:  1/25/2020  Date of Consult (When I saw the patient): 01/28/20    Assessment & Plan   Bhavani White is a 64 year old female who was admitted on 1/25/2020.     Impression:  1. 64 y.o who was recently admitted to the hospital was discharged on 21 January, that was a prolonged stay for perforated duodenal ulcer, s/p ex lap, TRUONG, Malecot drain placement with perforation, and wound VAC on 12/29/2019. Repeat ex-lap, TRUONG, closure of duodenal perforation over Malecot, G-tube/J-tube placements, delayed primary closure on 1/1/2020.  2. Admitted this occasion with abdominal pain, drainage from the G tube site.   3. CT scan with two large abscess cavities.   4. S/P multiple drain placement.   5. 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   6. She is on IV vancomycin and zosyn.   7. Diabetes.     Recommendations:   Continue on the broad spectrum antibiotics.   Follow up on the full information from the cultures.              Chacho Solis MD    Reason for Consult   Reason for consult: I was asked to evaluate this patient for intraabdominal infection.     Primary Care Physician   [facility]    Chief Complaint   Drainage from the G tube site along with pain    History is obtained from the patient and medical records    History of Present Illness   Bhavani White is a 64 year old female who presented with abdominal pain and wound check.  found to have a Complex anterior upper abdominal fluid collections. Patient with recent discharge on January 21 of 2020 for a perforated duodenal ulcer and associated hemorrhagic shock and bowel ischemia status post exploratory laparotomy with drain placement and wound VAC.       Past Medical History   I have reviewed this patient's medical history and updated it with pertinent information if needed.   Past Medical History:   Diagnosis Date     Anxiety       Asthma      Depression      DM (diabetes mellitus) (H)      Frequent UTI      History of blood transfusion      History of ulcer disease 2014    She notes from NSAIDs; nearly . Discussed a hospitalization at [facility] for this.     Hypertension      Hypothyroid      Iatrogenic Cushing's disease (H)     off prednisone now     Morbid obesity (H)      MRSA (methicillin resistant staph aureus) culture positive 2012    repeat cx 10/12/2012 no staph mentioned.     Osteoarthritis     multiple joings.     Other chronic pain      Sleep apnea     No longer uses cpap.       Past Surgical History   I have reviewed this patient's surgical history and updated it with pertinent information if needed.  Past Surgical History:   Procedure Laterality Date     ABDOMEN SURGERY       APPLY WOUND VAC  2019    Procedure: Apply Wound Vac;  Surgeon: Ayan Lopez MD;  Location: [facility] OR     C LAMINECTOMY,FACETECTOMY,LUMBAR  1982     C TOTAL KNEE ARTHROPLASTY      left     CLOSE SECONDARY WOUND ABDOMEN N/A 2020    Procedure: SECONDARY ABDOMINAL WOUND CLOSURE;  Surgeon: Ayan Lopez MD;  Location: [facility] OR     CYSTOSCOPY N/A 2018    Procedure: CYSTOSCOPY;  Cystoscopy and Botox Injection Into the Bladder and suprapubic tube exchange;  Surgeon: Yoandy Rios MD;  [facility]     CYSTOSCOPY N/A 3/15/2019    Procedure: Cystoscopy, suprapubic tube exchange;  Surgeon: Yoandy Rios MD;  [facility]     CYSTOSCOPY FLEXIBLE, CYSTOSTOMY, INSERT TUBE SUPRAPUBIC, COMBINED N/A 2019    Procedure: Cystoscopy, Bladder Botox Injection, Suprapubic Tube Change;  Surgeon: Yoandy Rios MD;  [facility]     CYSTOSCOPY, INTRAVESICAL INJECTION N/A 2017    Procedure: CYSTOSCOPY, INTRAVESICAL INJECTION;  Cystoscopy, Botox Injection Into The Bladder  Latex Allergy ;  Surgeon: Yoandy Rios MD;  [facility]     CYSTOSCOPY, INTRAVESICAL INJECTION N/A 3/8/2018     Procedure: CYSTOSCOPY, INTRAVESICAL INJECTION;  Cystoscopy, Botox Injection Into The Bladder and suprapubic catheter exchange;  Surgeon: Yoandy Rios MD;  [facility]     DISCECTOMY, FUSION CERVICAL ANTERIOR THREE+ LEVELS, COMBINED Left 5/3/2016    Procedure: COMBINED DISCECTOMY, FUSION CERVICAL ANTERIOR THREE+ LEVELS;  Surgeon: Jasvir Torres MD;  [facility]     GASTROSTOMY, INSERT TUBE, COMBINED N/A 1/1/2020    Procedure: GASTRIC-JEJUNAL FEEDING TUBE INSERTION;  Surgeon: Ayan Lopez MD;  Location:  OR     GENITOURINARY SURGERY      suprapubic catheter     HERNIA REPAIR  1957    double hernia     HYSTERECTOMY, PAP NO LONGER INDICATED      CELIA and large ovarian tumor removed     INJECT BOTOX N/A 9/21/2018    Procedure: INJECT BOTOX;;  Surgeon: Yoandy Rios MD;  [facility]     INJECT BOTOX N/A 3/15/2019    Procedure: Inject Botox into Bladder;  Surgeon: Yoandy Rios MD;  [facility]     LAPAROSCOPY DIAGNOSTIC (GENERAL) N/A 12/30/2019    Procedure: Laparoscopy diagnostic (general);  Surgeon: Ayan Lopez MD;  Location:  OR     LAPAROTOMY EXPLORATORY N/A 1/1/2020    Procedure: EXPLORATORY LAPAROTOMY;  Surgeon: Ayan Lopez MD;  Location:  OR     LAPAROTOMY EXPLORATORY N/A 12/30/2019    Procedure: LAPAROTOMY, REPAIR OF ULCER PERFORATION;  Surgeon: Ayan Lopez MD;  Location:  OR     SURGICAL HISTORY OF -       left cataract surgery     SURGICAL HISTORY OF -   12/14    right cataract surgery and left laser revision     SURGICAL HISTORY OF -   March 2015    left carpal tunnel release     TONSILLECTOMY  1974       Prior to Admission Medications   Prior to Admission Medications   Prescriptions Last Dose Informant Patient Reported? Taking?   DULoxetine (CYMBALTA) 60 MG capsule 1/25/2020 at Unknown time  Yes Yes   Sig: Take 120 mg by mouth every morning   VENTOLIN  (90 Base) MCG/ACT inhaler prn at prn  No Yes   Sig:  INHALE 2 PUFFS INTO THE LUNGS EVERY 6 HOURS AS NEEDED FOR SHORTNESS OF BREATH   albuterol (PROVENTIL) (2.5 MG/3ML) 0.083% neb solution Past Week at Unknown time  No Yes   Sig: Take 1 vial (2.5 mg) by nebulization every 4 hours as needed for shortness of breath / dyspnea or wheezing   amLODIPine (NORVASC) 5 MG tablet 2020 at Unknown time  No Yes   Si tablet (5 mg) by Oral or Feeding Tube route daily   bisacodyl (DULCOLAX) 10 MG suppository Past Week at Unknown time  No Yes   Sig: Place 1 suppository (10 mg) rectally daily as needed for constipation   blood glucose (NO BRAND SPECIFIED) lancets standard   No No   Sig: Use to test blood sugar 1 times daily or as directed.   blood glucose monitoring (FREESTYLE FREEDOM LITE) meter device kit   No No   Sig: Use to test blood sugar.   blood glucose monitoring (NO BRAND SPECIFIED) meter device kit Past Week at Unknown time  No Yes   Sig: Dispense freestyle zak glucose meter   blood glucose monitoring (NO BRAND SPECIFIED) test strip Past Week at Unknown time  No Yes   Sig: Use to test blood sugars 1 times daily or as directed   docusate sodium (COLACE) 100 MG capsule Past Week at prn  No Yes   Sig: Take 2 capsules (200 mg) by mouth 2 times daily   furosemide (LASIX) 40 MG tablet 2020 at Unknown time  No Yes   Sig: Take 1 tablet (40 mg) by mouth daily   gabapentin (NEURONTIN) 800 MG tablet 2020 at Unknown time  Yes Yes   Sig: Take 1,600 mg by mouth 3 times daily   hypromellose-dextran (ARTIFICAL TEARS) 0.1-0.3 % ophthalmic solution Past Week at Unknown time  No Yes   Sig: Place 1 drop into both eyes 3 times daily as needed for dry eyes   insulin aspart (NOVOLOG PEN) 100 UNIT/ML pen 2020 at Unknown time  No Yes   Sig: Inject 0-6 Units Subcutaneous 2 times daily Twice a day, for glucometer 150-200 give 2 units, 201-250 4 units, >250 6 units.   insulin glargine (LANTUS PEN) 100 UNIT/ML pen 2020 at Unknown time  No Yes   Sig: Inject 27 Units  Subcutaneous every morning (before breakfast)   levalbuterol (XOPENEX) 1.25 MG/3ML neb solution PRN at prn  Yes Yes   Sig: TAKE ONE VIAL BY NEBULIZATION EVERY 8 HOURS AS NEEDED FOR SHORTNESS OF BREATH/DYSPNEA OR WHEEZING   levothyroxine (SYNTHROID/LEVOTHROID) 88 MCG tablet 1/25/2020 at Unknown time  No Yes   Sig: TAKE 1 TABLET(88 MCG) BY MOUTH DAILY   methadone (DOLOPHINE) 10 MG tablet 1/25/2020 at Unknown time  No Yes   Sig: Take 2 tablets (20 mg) by mouth 2 times daily Per Rx bottle take 2 tablets (20mg) in morning, 1 tablet (10mg) in afternoon, 2 tablets (20mg) in evening.   miconazole (MICATIN/MICRO GUARD) 2 % external powder Past Week at Unknown time  No Yes   Sig: Apply topically 2 times daily as needed for other (topical candidiasis)   order for DME DME at [facility]  No No   Sig: Equipment being ordered: Hospital Bed, total electric (head, foot, and height adjustments), with any type side rails, with mattress   order for DME DME at [facility]  No No   Sig: Equipment being ordered: Motorized Wheelchair   order for DME DME at [facility]  No No   Sig: Equipment being ordered: Trapeze bar for hospital bed   order for DME DME at [facility]  No No   Sig: Equipment being ordered: Nebulizer   order for DME   No No   Sig: Equipment being ordered: BIPAP.   15/5, Rate of 12, 2L O2 to keep sats 90-95%.   order for DME DME at [facility]  No No   Sig: Equipment being ordered: Nebulizer   oxyCODONE (ROXICODONE) 5 MG tablet Past Month at Unknown time  No Yes   Sig: Take 1 tablet (5 mg) by mouth every 6 hours as needed for moderate to severe pain   pantoprazole (PROTONIX) 40 MG EC tablet 1/25/2020 at Unknown time  No Yes   Sig: Take 1 tablet (40 mg) by mouth 2 times daily (before meals)   polyethylene glycol (MIRALAX/GLYCOLAX) packet Past Week at Unknown time  No Yes   Sig: Take 17 g by mouth 2 times daily as needed (constipation)   potassium chloride ER (K-DUR/KLOR-CON M) 20 MEQ CR tablet Past Week at Unknown time  No Yes   Sig: Take 1 tablet (20 mEq) by  "mouth daily   senna-docusate (SENOKOT-S;PERICOLACE) 8.6-50 MG per tablet Past Week at Unknown time  No Yes   Sig: Take 2 tablets by mouth 2 times daily   simvastatin (ZOCOR) 20 MG tablet prn at prn  No Yes   Sig: TAKE 1 TABLET(20 MG) BY MOUTH AT BEDTIME   traZODone (DESYREL) 100 MG tablet prn at prn  Yes Yes   Sig: Take 100 mg by mouth At Bedtime      Facility-Administered Medications: None     Allergies   Allergies   Allergen Reactions     Povidone Iodine      \"mild skin irritation\" per patient report     Toradol [Ketorolac] Other (See Comments)     Pt gets nightmares     Tramadol Other (See Comments)       Immunization History   Immunization History   Administered Date(s) Administered     Influenza (IIV3) PF 11/05/2010, 10/07/2014     Influenza Vaccine IM > 6 months Valent IIV4 10/26/2016, 10/27/2017     Influenza Vaccine, 3 YRS +, IM (QUADRIVALENT W/PRESERVATIVES) 09/29/2015, 10/03/2019     Pneumo Conj 13-V (2010&after) 10/26/2016     Pneumococcal 23 valent 11/21/2001, 04/30/2012     Tdap (Adacel,Boostrix) 08/22/2013       Social History   I have reviewed this patient's social history and updated it with pertinent information if needed. Bhavani M White  reports that she has never smoked. She has never used smokeless tobacco. She reports current drug use. Drug: Marijuana. She reports that she does not drink alcohol.    Family History   I have reviewed this patient's family history and updated it with pertinent information if needed.   Family History   Problem Relation Age of Onset     Depression Son      Hypertension Mother      Heart Disease Mother         bypass     Depression Mother      Hypertension Father      Connective Tissue Disorder Father         ankylosing spondylitis     Cancer Father         colon; prostate     Other Cancer Father         bladder cancer     Depression Sister        Review of Systems   The 10 point Review of Systems is negative other than noted in the HPI or here.     Physical Exam "   Temp: 97.3  F (36.3  C) Temp src: Axillary BP: (!) 153/76 Pulse: 82 Heart Rate: 80 Resp: 16 SpO2: 95 % O2 Device: Nasal cannula Oxygen Delivery: 1 LPM  Vital Signs with Ranges  Temp:  [96.1  F (35.6  C)-98  F (36.7  C)] 97.3  F (36.3  C)  Pulse:  [77-82] 82  Heart Rate:  [75-86] 80  Resp:  [16] 16  BP: (124-162)/(58-87) 153/76  SpO2:  [93 %-100 %] 95 %  268 lbs 8.32 oz  Body mass index is 44.68 kg/m .    GENERAL APPEARANCE:  sleepy  EYES: Eyes grossly normal to inspection, PERRL and conjunctivae and sclerae normal  HENT: ear canals and TM's normal and nose and mouth without ulcers or lesions  NECK: no adenopathy, no asymmetry, masses, or scars and thyroid normal to palpation  RESP: lungs clear to auscultation - no rales, rhonchi or wheezes  CV: regular rates and rhythm, normal S1 S2, no S3 or S4 and no murmur, click or rub  LYMPHATICS: normal ant/post cervical and supraclavicular nodes  ABDOMEN: soft, multiple drains in place   MS: extremities normal- no gross deformities noted  SKIN: no suspicious lesions or rashes      Data   Lab Results   Component Value Date    WBC 9.3 01/27/2020    HGB 10.2 (L) 01/27/2020    HCT 34.6 (L) 01/27/2020     01/27/2020     01/26/2020    POTASSIUM 4.0 01/26/2020    CHLORIDE 97 01/26/2020    CO2 41 (H) 01/26/2020    BUN 25 01/26/2020    CR 0.56 01/28/2020     (H) 01/26/2020    SED 11 03/04/2010    DD 2.9 (H) 01/19/2009    NTBNPI 2,200 (H) 01/18/2020    TROPI 0.037 01/18/2020    AST 22 01/26/2020    ALT 20 01/26/2020    ALKPHOS 136 01/26/2020    BILITOTAL 0.3 01/26/2020    INR 1.18 (H) 01/27/2020     Recent Labs   Lab 01/27/20  1035 01/27/20  1000 01/25/20  2336 01/25/20  2241   CULT PENDING  PENDING Culture in progress  PENDING 50,000 to 100,000 colonies/mL  Pseudomonas aeruginosa  Additional susceptibilities in progress  1.28.20  *  No growth after 2 days Heavy growth  Staphylococcus aureus  Susceptibility testing in progress  *  Culture in progress  No  growth after 2 days     Recent Labs   Lab Test 01/27/20  1035 01/27/20  1000 01/25/20  2336 01/25/20  2241 01/04/20  1455 01/02/20  1233 12/30/19  0425 12/29/19  1507 12/29/19  1435   CULT PENDING  PENDING Culture in progress  PENDING 50,000 to 100,000 colonies/mL  Pseudomonas aeruginosa  Additional susceptibilities in progress  1.28.20  *  No growth after 2 days Heavy growth  Staphylococcus aureus  Susceptibility testing in progress  *  Culture in progress  No growth after 2 days No growth Cultured on the 1st day of incubation:  Staphylococcus hominis  *  Critical Value/Significant Value, preliminary result only, called to and read back by  [facility] @1133 AE    Susceptibility testing done on previous specimen Methicillin resistant Staphylococcus aureus (MRSA) isolated* No growth Cultured on the 1st day of incubation:  Staphylococcus hominis  *  Critical Value/Significant Value, preliminary result only, called to and read back by  Suhail Escobar RN from [facility] ICU. 12/30/19 at 1202.TV.    Cultured on the 1st day of incubation:  Staphylococcus epidermidis  *  (Note)  POSITIVE for Staphylococci other than S.aureus, S.epidermidis and  S.lugdunensis, by Verigene multiplex nucleic acid test.  Coagulase-negative staphylococci are the most common venipuncture or  collection associated skin CONTAMINANTS grown in blood cultures.  Final identification and antimicrobial susceptibility testing will be  verified by standard methods.    Specimen tested with Verigene multiplex, gram-positive blood culture  nucleic acid test for the following targets: Staph aureus, Staph  epidermidis, Staph lugdunensis, other Staph species, Enterococcus  faecalis, Enterococcus faecium, Streptococcus species, S. agalactiae,  S. anginosus grp., S. pneumoniae, S. pyogenes, Listeria sp., mecA  (methicillin resistance) and Nick/B (vancomycin resistance).    Critical Value/Significant Value called to and read back by  FANNY SAMANO RN 12/30/2019 @ 1437 BC         Amount of time performed on this consult: 45 minutes. This includes face to face assessment and care coordination with the primary team.

## 2020-01-28 NOTE — PROGRESS NOTES
[facility] Nurse Inpatient Wound Assessment   Reason for consultation: Evaluate and treat:  Bilateral posterior upper thighs/ gluteal folds/coccyx and sacrum    Assessment  - sacrum, coccyx, gluteal cleft intact without erythema.   - posterior bilateral upper thighs/ gluteal folds with significant amount of loose skin from significant weight loss.  This significantly loose tissue is rubbing, shearing, pressing on itself and causing areas of superficial blisters that are clean, no s/s of infection but can be very painful, currently the right side is very tender.   -scattered new areas of ecchymosis on this admission with skin intact.         Will use large Mepilex Sacral dressings to support the tissue.  Staff to continue their excellent care with cleaning and baby powder to reduce friction and chaffing.      Bariatric Pulsate for additional off-loading. Breathing improved and pt can lay flatter but often non-compliant turning    Treatment Plan  Plan of care for posterior upper thighs/ gluteal fold AND COCCYX- odd days and prn  1. Clean tissue with saline and pat dry  2. Paint entire area where you want a Mepilex Dressing , with 3M No Sting Skin Prep(#[id number]) and allow to dry thoroughly.  You will need an extra hand to keep tissue flat and smooth, don't pull tissue, just flatten it  3. Press a Mepilex  Sacral Dressing (PS#[id number])  to each posterior  thigh.      Press a Mepilex  Sacral Dressing (PS#[id number])  to the coccyx,    PIP:    1. Moisture:Perineal cares     -Carefully clean with Eduin Cleanse and Protect, getting into all the folds, etc,       -Dust with baby powder to reduce chaffing and moisture  2. Position side to side only every 2, hours, even with a rotational mattress  3.  Keep heels elevated at all times.   4. Attempt to lower bed to 30 degrees for additional off-loading  5. Nutrition following> TF and increasing to full liquids  6.  Mobilize pt when medically indicated.   7. SP for  UIC and incontinence protocol for FIC    Orders In Epic  WO Nurse follow-up plan: weekly   Nursing to notify the Provider(s) and re-consult the WOC Nurse if wound(s) deteriorates or new skin concern.    Patient History  According to provider note(s):  Bhavani White is a 64 year old female with hx of DM2, chronic cystitis s/p chronic SP catheter, chronic pain on methadone maintenance, asthma, ANIYA, and hypothyroidism who was admitted to the ICU service on 12/29/2019 for a perforated duodenal ulcer with associated septic shock and bowel ischemia. She is s/p ex-lap x2, but perforation could not be entirely repaired due to size; she is s/p THAI drains and GJ tube placement. Hospital course has otherwise been complicated by stress cardiomyopathy. She was weaned off pressors on 1/5 and successfully extubated on 1/6. She was transferred the the Hospitalist service on 1/6.      Acute metabolic encephalopathy, Improving  Multifactorial due to hemorrhagic/septic shock, ICU delirium. She was started on scheduled Seroquel while in the ICU which was discontinued with improvement in her mental status   - Currently alert and oriented  - Seroquel 25 mg TID PRN for anxiety, agitation     Perforated duodenal ulcer with hemorrhagic shock and bowel ischemia, s/p ex lap, TRUONG, Malecot drain placement in perforation, and wound VAC on 12/29/2019, repeat ex-lap, TRUONG, closure of duodenal perforation over Malecot, G-tube/J-tube placements, and delayed primary closure on 1/1/2020    Re-admit 1-  Pt with complicated abdominal process.  Admitted with abdomen pain and purulent drainage from around G-tube  CT shows two large abscess cavities  We will have IR drain these collections, okay to wait until 1/27  Continue with G-tube to gravity/suction  Daily dressing changes and re-packing of abdomen wound   May resume J-tube feeds  No plans for repeat surgery  Art Willingham MD    Objective Data  Containment of urine/stool: SP for UIC and  Incontinence protocol for FIC    Active Diet Order: TF to resume when medically indicated  Orders Placed This Encounter      Full Liquid Diet    Output:   I/O last 3 completed shifts:  In: 1406.5 [P.O.:240; I.V.:101.5; NG/GT:435]  Out: 5210 [Urine:4450; Emesis/NG output:675; Drains:85]    Risk Assessment:   Sensory Perception: 3-->slightly limited  Moisture: 3-->occasionally moist  Activity: 2-->chairfast  Mobility: 2-->very limited  Nutrition: 2-->probably inadequate  Friction and Shear: 1-->problem  Joe Score: 13                          Labs:   Recent Labs   Lab 02/02/20  0550  01/27/20  0826   HGB 9.8*   < >  --    INR  --   --  1.18*   WBC 16.0*   < >  --     < > = values in this interval not displayed.       Physical Exam  Skin inspection: Coccyx/sacrum and bilateral  posterior thighs  1-27-20 coccyx and Lt buttock     Lt buttock and coccyx:   -Blanchable erythema with scattered spots of non-blanchable ecchymosis. All skin intact but scattered scar tissue    1-27-29 Bilateral posterior thighs      Bilateral posterior upper thighs with slightly moist, popped blisters, loose epidermis that is dry, devitalized skin flaps.  Each side is about 5cm x 3cm, the right is painful  Lt Posterior thigh with 2.0cm x 1.0cm non-blanchable ecchymosi with skin intact  Pt had small brown BM     Interventions  Current support surface Bariatric pulsate - ordered  Current off-loading measures: pillows / chair cushion  Visual inspection of wound(s) completed  Wound Care: Meplilex sacral in sacrum and bilateral posterior thighs for prevention  Supplies: In pt room   Education provided: Discussed importance of repositioning and HOB below 30 degrees if medically indicated      Maty Epstein RN  CWOC Nurse

## 2020-01-28 NOTE — PLAN OF CARE
Pt. A/Ox4. Anxious at times.Hypertensive, 1L O2, wean as able. C/o chronic back and R hip pain, managed with PRN norco x1, scheduled methadone. PRN nebs x2, per patient request. Multiple drains, see sign out report. 2 THAI drains placed today, thick milky/purulent drainage, moderate output. Coccyx and buttock excoriated, reddened and blanchable, mepilex in place. See by WOC today, all dressings changed,  see orders. T/R q2h, refusing at times. R PIV infusing NS @ 100mL/hr. Bariatric pulsate in place. Tele: SR with BBB. Started TF this evening through J-tube, running @ GR 60mL/hr with 60mL/4h flushes. BG q4h. Clear liquid diet, tolerating well. Plan to discharge back to previous TCU once medically stable.

## 2020-01-28 NOTE — PROGRESS NOTES
CHANELLE  I: CHANELLE received a call from Emilie that patient does have a bed hold at [facility].     P: CHANELLE will continue to follow and assist as needed.    SARAH Hernandez, LGSW  [phone]  [facility]

## 2020-01-28 NOTE — PLAN OF CARE
A&Ox4. VSS on 1L O2 ex HTN. Tele: NSR with BBB.  Denies pain this shift. PRN neb given x1 per pt request. Multiple drains- THAI drains placed yesterday - thick milky drainage. Coccyx and buttock excoriated- mepilex CDI. T&R [id number] positioning all night. Bariatric pulsate in place. Slept well between cares. TF at goal- 60 mL/hr. BG q4h. Tolerating clear liquid diet. Plan to discharge back to [facility] once able.

## 2020-01-28 NOTE — PROGRESS NOTES
[facility]    Hospitalist Progress Note    Assessment & Plan   Bhavani White is a 64 year old female who was admitted on 1/25/2020.  Patient had a long stay at [facility] and was discharged on 21 January, reported back on 26th May due to increased abdominal abscess.    Complex anterior upper abdominal fluid collections, recent discharge on January 21, 2020 status post perforated duodenal ulcer status post exploratory laparotomy primary closure delayed on January 1, 2020: Patient with recent discharge on January 21 of 2020 for a perforated duodenal ulcer and associated hemorrhagic shock and bowel ischemia status post exploratory laparotomy with drain placement and wound VAC.  Per report, patient with GJ tube and yellow discharge leaking around the site this afternoon prior to admission.  However, staff at facility noted more discharge and decided to call EMS, patient sent to the emergency department for further evaluation and treatment.  CT on admission shows complex anterior upper abdominal fluid collections more loculated than previous, likely developing abscesses with increased volume, communication with left upper quadrant collection to the multiloculated anterior epigastric collection, increased ascites, pleural effusions, suprapubic bladder catheter and percutaneous jejunostomy, see report for further details.  -Appreciate surgery input, consulted interventional radiology, she underwent drain placement 1/27  -Continue IV antibiotics, on Vanco and Zosyn, will request infectious disease input .  - restarted tube feeding and her PTA meds.  - per IR Recommend a follow up CT once outputs from pigtail drains are < 20 a day.     Cardiac, hyperlipidemia, hypertension: Patient maintained on statin prior to admission.  Previous echocardiogram with EF at 60 to 65%, right ventricular global function probably normal, per report.  -Continue simvastatin, amlodipine and Lasix depending on fluid  status    Pseudomonas in urine: defer to ID whether this is colonizer vs pathogenic. 50-100k organisms.     Diabetes mellitus, insulin-dependent: Patient maintained on Lantus prior to admission.  -Continue prior to admission Lantus 27 units subcutaneous every morning when verified by pharmacy.  -Insulin sliding scale, medium.     Hypothyroidism: Stable.  -Continue prior to admission levothyroxine      Chronic pain, on chronic methadone, anxiety, depression: Patient maintained on methadone prior to admission.  -PTA medications were restarted including gabapentin, methadone and Cymbalta.     GERD: Stable.  -Continue prior to admission pantoprazole when verified by pharmacy.     Insomnia: Stable.  -Continue prior to admission trazodone when verified by pharmacy.    DVT Prophylaxis: sequential compression device   Code Status: Full Code     Disposition: anticipate prolonged hospitalization    Morelia Lin MD    Interval History   Patient relatively comfortable. Requesting more frequent albuterol than Q4h. Feels subjectively SOB. No N/V. Abdominal pain currently controlled.      -Data reviewed today: I reviewed all new labs and imaging results over the last 24 hours.     Physical Exam   Temp: 97.3  F (36.3  C) Temp src: Axillary BP: (!) 153/76 Pulse: 82 Heart Rate: 80 Resp: 16 SpO2: 95 % O2 Device: Nasal cannula Oxygen Delivery: 1 LPM  Vitals:    01/25/20 2224 01/26/20 0502 01/27/20 0600   Weight: 113.4 kg (250 lb) 122.2 kg (269 lb 6.4 oz) 121.8 kg (268 lb 8.3 oz)     Vital Signs with Ranges  Temp:  [96.1  F (35.6  C)-98  F (36.7  C)] 97.3  F (36.3  C)  Pulse:  [79-82] 82  Heart Rate:  [75-86] 80  Resp:  [16] 16  BP: (124-162)/(58-87) 153/76  SpO2:  [93 %-100 %] 95 %  I/O last 3 completed shifts:  In: -   Out: 2310 [Urine:1150; Emesis/NG output:500; Drains:660]    Constitutional: Awake, alert, cooperative, no apparent distress. Morbidly obese.  Respiratory: Basilar crackles noted  Cardiovascular: Regular rate and  rhythm, normal S1 and S2, and no murmur noted  GI: G-tube noted suprapubic catheter noted, she has healing incision, has multiple THAI drains placed by IR, bowel sounds are reduced.  Skin/Integumen: No rashes, no cyanosis, 1+ edema  Neuro : moving all 4 extremities, no focal deficit noted     Medications     dextrose       sodium chloride 100 mL/hr at 01/27/20 2156       amLODIPine  5 mg Oral or Feeding Tube Daily     DULoxetine  120 mg Oral QAM     gabapentin  1,600 mg Oral or Feeding Tube TID     insulin aspart  1-6 Units Subcutaneous Q4H     insulin glargine  27 Units Subcutaneous QAM AC     ipratropium - albuterol 0.5 mg/2.5 mg/3 mL  3 mL Nebulization 4x daily     [START ON 1/29/2020] levothyroxine  88 mcg Oral or Feeding Tube QAM AC     methadone  20 mg Oral or Feeding Tube BID    Or     methadone  20 mg Oral BID     pantoprazole  40 mg Oral BID AC    Or     pantoprazole  40 mg Oral or Feeding Tube BID AC     piperacillin-tazobactam  3.375 g Intravenous Q6H     simvastatin  20 mg Oral or Feeding Tube At Bedtime     sodium chloride (PF)  10 mL Irrigation Q8H     sodium chloride (PF)  10 mL Irrigation Q8H     sodium chloride (PF)  3 mL Intracatheter Q8H     vancomycin place ortega - receiving intermittent dosing  1 each Intravenous See Admin Instructions       Data   Recent Labs   Lab 01/28/20  0729 01/27/20  0826 01/27/20  0746 01/26/20  0821 01/25/20  2241   WBC  --   --  9.3 11.5* 13.0*   HGB  --   --  10.2* 9.8* 9.7*   MCV  --   --  89 90 89   PLT  --   --  352 342 336   INR  --  1.18*  --   --   --    NA  --   --   --  138 136   POTASSIUM  --   --   --  4.0 3.7   CHLORIDE  --   --   --  97 94   CO2  --   --   --  41* 41*   BUN  --   --   --  25 27   CR 0.56  --  0.65 0.65 0.62   ANIONGAP  --   --   --  <1* 1*   DORY  --   --   --  10.2* 10.4*   GLC  --   --   --  105* 144*   ALBUMIN  --   --   --  1.7* 1.8*   PROTTOTAL  --   --   --  5.8* 6.3*   BILITOTAL  --   --   --  0.3 0.3   ALKPHOS  --   --   --  136 148    ALT  --   --   --  20 22   AST  --   --   --  22 22   LIPASE  --   --   --   --  164     Recent Labs   Lab 01/28/20  0754 01/28/20  0400 01/28/20  0024 01/27/20 2015 01/27/20  1641  01/26/20  0821  01/25/20  2241   GLC  --   --   --   --   --   --  105*  --  144*   * 141* 137* 119* 112*   < >  --    < >  --     < > = values in this interval not displayed.       Imaging:   Recent Results (from the past 24 hour(s))   CT Abdomen Peritoneum Abscess Drainage    Narrative    CT-GUIDED RIGHT UPPER AND LEFT UPPER QUADRANT DRAINS  1/27/2020 11:00  AM    MEDICATIONS/SEDATION TIME: 27 1% lidocaine locally, 3.5 mg Versed, 175  mcg fentanyl, Given by Neva Dixon RN. Throughout the procedure, the  patient was monitored by a radiology nurse for cardiac rhythm and  oxygen saturation which remained stable.    CONTRAST: None.    COMPLICATIONS: No immediate.    HISTORY: Patient has a history of perforated duodenal ulcers.  Underwent exploratory laparotomy and repair with surgical drain  placement in early January. Presented purulent drainage from a  gastrostomy tube site. Of note the patient has both a gastrostomy tube  and a jejunostomy tube. CT scan performed 1/25/2020 demonstrated  interval progression in the size of the right upper quadrant and left  upper quadrant fluid collections. Both collections may communicate  with a multiloculated phlegmonous collection in the lower midline  abdomen.    PROCEDURE AND FINDINGS:  Following a discussion of the risks,  benefits, indications and alternatives to treatment, appropriate  informed consent was obtained.  The patient was brought to the  interventional radiology suite and placed supine on the table. The  anterior abdomen was prepped and draped in a sterile fashion.  A time  out was performed per universal protocol policy to ensure correct  patient, site and procedure to be performed. Radiation dose for this  scan was reduced using automated exposure control, adjustment of  the  mA and/or kV according to patient size, or iterative reconstruction  technique.     5 mm unenhanced images were obtained through the region of the  superior abdomen, images showed right upper and left upper quadrant  fluid collections. A suitable route for percutaneous drainage was then  chosen. The overlying skin was prepped and draped in a sterile manner.  1% lidocaine was used for local anesthesia. Under CT guidance,  18-gauge trocar needle was advanced into the left upper quadrant  collection initially. 10 mL of purulent  fluid was aspirated and sent  to lab for culture. A 0.035 wire was advanced through the catheter and  exchange was made for a 10 Mongolian pigtail drain. The pigtail was  locked and connected to THAI bulb. The drain was secured to the skin  with an adhesive device. A sterile dressing was applied.     Then a second 18-gauge needle was advanced into the right upper  quadrant collection. 10 mL of purulent fluid was aspirated and sent to  the laboratory. An 035 guidewire was advanced through the needle. The  tract was serially dilated, and a 10 Mongolian drain was advanced over  the wire. The pigtail loop was engaged. The catheter was secured to  the skin with an adhesive device.     Moderate sedation:  The patient was administered titrated doses of  Versed and fentanyl intravenously during the procedure. Throughout the  procedure, the patient was monitored independently by a radiology  nurse for cardiac rhythm and oxygen saturation which remained stable.  Total sedation time was 30. The patient tolerated the procedure well  and left interventional radiology in stable condition.      Impression    IMPRESSION:  1. CT-guided placement of two separate drains; the first in a left  upper quadrant fluid collection, and the second in a right upper  quadrant fluid collection.  2. Purulent fluid was aspirated from each drain.  3. The upper quadrant fluid collections likely communicate with a  loculated,  phlegmonous collection in the more inferior midline  abdomen.  4. Recommend obtaining repeat CT scan after drainage output is  decreased to less than 20 cc.    LORA MARTIN MD

## 2020-01-28 NOTE — PROGRESS NOTES
Bhavani White is a 64 year old woman with a history of obesity, fibromyalgia, chronic back pain on methadone and a perforated duodenal ulcer in 12/2019 status post 3 surgeries to repair with last exploratory laparotomy on January 1, 2020 s/p 2 mallencott drains, gastrostomy tube for decompression, jejunostomy tube for nutrition among other treatments, discharged form the hospital on 1/21/2020 and readmitted to the hospital on 1/26/2020 with abdominal pain and fevers.     CT was done which showed increase in size of a right and left upper abdomen fluid collections possible abscesses. CT guided drain was placed in each collection 1/27/2020 with a large amount of drainage, left 400 mL and Right 170 mL yesterday.     She is being seen in IR follow up today    S: Pain at new drain sites. Sweaty.     O: Temp:  [96.1  F (35.6  C)-98  F (36.7  C)] 97.3  F (36.3  C)  Pulse:  [82] 82  Heart Rate:  [76-86] 80  Resp:  [16] 16  BP: (136-160)/(58-80) 153/76  SpO2:  [93 %-100 %] 95 %    Labs, notes, imaging, IOs and VSs reviewed    ROUTINE ICU LABS (Last four results)  CMP  Recent Labs   Lab 01/28/20  0729 01/27/20  0746 01/26/20  0821 01/25/20  2241   NA  --   --  138 136   POTASSIUM  --   --  4.0 3.7   CHLORIDE  --   --  97 94   CO2  --   --  41* 41*   ANIONGAP  --   --  <1* 1*   GLC  --   --  105* 144*   BUN  --   --  25 27   CR 0.56 0.65 0.65 0.62   GFRESTIMATED >90 >90 >90 >90   GFRESTBLACK >90 >90 >90 >90   DORY  --   --  10.2* 10.4*   MAG  --  1.9  --   --    PROTTOTAL  --   --  5.8* 6.3*   ALBUMIN  --   --  1.7* 1.8*   BILITOTAL  --   --  0.3 0.3   ALKPHOS  --   --  136 148   AST  --   --  22 22   ALT  --   --  20 22     CBC  Recent Labs   Lab 01/27/20  0746 01/26/20  0821 01/25/20  2241   WBC 9.3 11.5* 13.0*   RBC 3.89 3.67* 3.61*   HGB 10.2* 9.8* 9.7*   HCT 34.6* 33.1* 32.2*   MCV 89 90 89   MCH 26.2* 26.7 26.9   MCHC 29.5* 29.6* 30.1*   RDW 14.9 15.1* 15.0    342 336     INR  Recent Labs   Lab 01/27/20  0826   INR  1.18*     Arterial Blood GasNo lab results found in last 7 days.    General-Lethargic, oriented, appropriate in some distress  -Right and Left radiology drain dressings CD&I  -Both flushed easily with a total of 20 mL NS without pain or leaking and able to aspirate out what was flushed in.  -Output-Milky grey with some darker clots on the right side.   Output-Left 400 mL; Right 170  ML  Cultures pending, gram stain some gram positive cocci and gram negative rods     A/P: Bhavani White is a 64 year old woman with a recent discharge s/p complicated repair for perforated ulcer who was readmitted to the hospital on 1/26/2020 with abdominal pain and fevers.     CT was done which showed increase in size of a right and left upper abdomen fluid collections possible abscesses. CT guided drain was placed in each collection 1/27/2020 with a large amount of drainage, left 400 mL and Right 170 mL yesterday.    Good outputs from drain. Cultures pending, Improved leukocytosis from antibxs and drainage. Afebrile. Recommend a follow up CT once outputs from pigtail drains are < 20 a day.     Will follow.     [facility] Interventional Radiology CNP ([phone]) (phone [phone])

## 2020-01-28 NOTE — PLAN OF CARE
Patient is A/ox4. VSS on 1L O2 (baseline RA) except hypertensive. C/o back, L hip, and abdominal pain, scheduled methadone and IV PRN Dilaudid given x1. Tele SR w/ BBB. LS diminished. Patient continues to c/o shortness of breath, PRN Albuterol neb given x1 and scheduled Duo-nebs added today. R midline wound cares completed per WOC orders, dressing CDI. Multiples (x7) drains - 4x4 dressing to site with small amount of dried yellow drainage. THAI drains placed yesterday with thick, milky, tan output. Clear liquid diet, patient taking in ice chips and water. J-tube running at goal rate 60ml/hr with q4h 60mL water flushes. Blood sugars 175 and 159. Coccyx and posterior thighs excoriated,  mepliex dressings CDI. Turn and repo q2h, patient refuses at times. Bariatric pulsate mattress in place. ID consulted, patient seen today.  Plan to continued with current IV abx regimen and await cultures. Nursing to continue to monitor.

## 2020-01-29 LAB
BACTERIA SPEC CULT: ABNORMAL
CREAT SERPL-MCNC: 0.55 MG/DL (ref 0.52–1.04)
ERYTHROCYTE [DISTWIDTH] IN BLOOD BY AUTOMATED COUNT: 15.3 % (ref 10–15)
GFR SERPL CREATININE-BSD FRML MDRD: >90 ML/MIN/{1.73_M2}
GLUCOSE BLDC GLUCOMTR-MCNC: 125 MG/DL (ref 70–99)
GLUCOSE BLDC GLUCOMTR-MCNC: 128 MG/DL (ref 70–99)
GLUCOSE BLDC GLUCOMTR-MCNC: 130 MG/DL (ref 70–99)
GLUCOSE BLDC GLUCOMTR-MCNC: 131 MG/DL (ref 70–99)
GLUCOSE BLDC GLUCOMTR-MCNC: 132 MG/DL (ref 70–99)
GLUCOSE BLDC GLUCOMTR-MCNC: 143 MG/DL (ref 70–99)
GLUCOSE BLDC GLUCOMTR-MCNC: 144 MG/DL (ref 70–99)
HCT VFR BLD AUTO: 32.6 % (ref 35–47)
HGB BLD-MCNC: 9.8 G/DL (ref 11.7–15.7)
LACTATE BLD-SCNC: 1.3 MMOL/L (ref 0.7–2)
Lab: ABNORMAL
Lab: ABNORMAL
MCH RBC QN AUTO: 26.5 PG (ref 26.5–33)
MCHC RBC AUTO-ENTMCNC: 30.1 G/DL (ref 31.5–36.5)
MCV RBC AUTO: 88 FL (ref 78–100)
PLATELET # BLD AUTO: 329 10E9/L (ref 150–450)
RBC # BLD AUTO: 3.7 10E12/L (ref 3.8–5.2)
SPECIMEN SOURCE: ABNORMAL
SPECIMEN SOURCE: ABNORMAL
WBC # BLD AUTO: 13.1 10E9/L (ref 4–11)

## 2020-01-29 PROCEDURE — 85027 COMPLETE CBC AUTOMATED: CPT | Performed by: HOSPITALIST

## 2020-01-29 PROCEDURE — 25000132 ZZH RX MED GY IP 250 OP 250 PS 637: Mod: GY | Performed by: INTERNAL MEDICINE

## 2020-01-29 PROCEDURE — 94640 AIRWAY INHALATION TREATMENT: CPT

## 2020-01-29 PROCEDURE — 94640 AIRWAY INHALATION TREATMENT: CPT | Mod: 76

## 2020-01-29 PROCEDURE — 99233 SBSQ HOSP IP/OBS HIGH 50: CPT | Performed by: HOSPITALIST

## 2020-01-29 PROCEDURE — 25000132 ZZH RX MED GY IP 250 OP 250 PS 637: Mod: GY | Performed by: HOSPITALIST

## 2020-01-29 PROCEDURE — 25000125 ZZHC RX 250: Performed by: HOSPITALIST

## 2020-01-29 PROCEDURE — 12000000 ZZH R&B MED SURG/OB

## 2020-01-29 PROCEDURE — 25000131 ZZH RX MED GY IP 250 OP 636 PS 637: Mod: GY | Performed by: INTERNAL MEDICINE

## 2020-01-29 PROCEDURE — 25000128 H RX IP 250 OP 636

## 2020-01-29 PROCEDURE — 40000275 ZZH STATISTIC RCP TIME EA 10 MIN

## 2020-01-29 PROCEDURE — 83605 ASSAY OF LACTIC ACID: CPT | Performed by: HOSPITALIST

## 2020-01-29 PROCEDURE — 25800030 ZZH RX IP 258 OP 636: Performed by: HOSPITALIST

## 2020-01-29 PROCEDURE — 25000128 H RX IP 250 OP 636: Performed by: HOSPITALIST

## 2020-01-29 PROCEDURE — 00000146 ZZHCL STATISTIC GLUCOSE BY METER IP

## 2020-01-29 PROCEDURE — 25000132 ZZH RX MED GY IP 250 OP 250 PS 637: Mod: GY

## 2020-01-29 PROCEDURE — 36415 COLL VENOUS BLD VENIPUNCTURE: CPT | Performed by: HOSPITALIST

## 2020-01-29 PROCEDURE — 25800030 ZZH RX IP 258 OP 636

## 2020-01-29 PROCEDURE — 82565 ASSAY OF CREATININE: CPT | Performed by: HOSPITALIST

## 2020-01-29 PROCEDURE — 27210437 ZZH NUTRITION PRODUCT SEMIELEM INTERMED LITER

## 2020-01-29 RX ORDER — ONDANSETRON 2 MG/ML
4 INJECTION INTRAMUSCULAR; INTRAVENOUS EVERY 6 HOURS PRN
Status: DISCONTINUED | OUTPATIENT
Start: 2020-01-29 | End: 2020-02-12 | Stop reason: HOSPADM

## 2020-01-29 RX ORDER — SENNOSIDES 8.6 MG
1 TABLET ORAL 2 TIMES DAILY
Status: DISCONTINUED | OUTPATIENT
Start: 2020-01-29 | End: 2020-01-30

## 2020-01-29 RX ORDER — NALOXONE HYDROCHLORIDE 0.4 MG/ML
.1-.4 INJECTION, SOLUTION INTRAMUSCULAR; INTRAVENOUS; SUBCUTANEOUS
Status: DISCONTINUED | OUTPATIENT
Start: 2020-01-29 | End: 2020-01-31

## 2020-01-29 RX ORDER — FUROSEMIDE 40 MG
40 TABLET ORAL
Status: DISCONTINUED | OUTPATIENT
Start: 2020-01-30 | End: 2020-01-30

## 2020-01-29 RX ORDER — POLYETHYLENE GLYCOL 3350 17 G/17G
17 POWDER, FOR SOLUTION ORAL DAILY
Status: DISCONTINUED | OUTPATIENT
Start: 2020-01-29 | End: 2020-02-12

## 2020-01-29 RX ORDER — ONDANSETRON 4 MG/1
4 TABLET, ORALLY DISINTEGRATING ORAL EVERY 6 HOURS PRN
Status: DISCONTINUED | OUTPATIENT
Start: 2020-01-29 | End: 2020-02-12 | Stop reason: HOSPADM

## 2020-01-29 RX ADMIN — METHADONE HYDROCHLORIDE 20 MG: 10 TABLET ORAL at 21:28

## 2020-01-29 RX ADMIN — HYDROMORPHONE HYDROCHLORIDE 0.2 MG: 1 INJECTION, SOLUTION INTRAMUSCULAR; INTRAVENOUS; SUBCUTANEOUS at 04:28

## 2020-01-29 RX ADMIN — SIMVASTATIN 20 MG: 20 TABLET, FILM COATED ORAL at 21:28

## 2020-01-29 RX ADMIN — Medication 40 MG: at 09:07

## 2020-01-29 RX ADMIN — VANCOMYCIN HYDROCHLORIDE 2000 MG: 1 INJECTION, POWDER, LYOPHILIZED, FOR SOLUTION INTRAVENOUS at 13:16

## 2020-01-29 RX ADMIN — IPRATROPIUM BROMIDE AND ALBUTEROL SULFATE 3 ML: .5; 3 SOLUTION RESPIRATORY (INHALATION) at 12:03

## 2020-01-29 RX ADMIN — DULOXETINE HYDROCHLORIDE 120 MG: 60 CAPSULE, DELAYED RELEASE ORAL at 09:02

## 2020-01-29 RX ADMIN — CEFEPIME HYDROCHLORIDE 2 G: 2 INJECTION, POWDER, FOR SOLUTION INTRAVENOUS at 12:06

## 2020-01-29 RX ADMIN — LEVOTHYROXINE SODIUM 88 MCG: 88 TABLET ORAL at 06:26

## 2020-01-29 RX ADMIN — ALBUTEROL SULFATE 2.5 MG: 2.5 SOLUTION RESPIRATORY (INHALATION) at 21:17

## 2020-01-29 RX ADMIN — INSULIN ASPART 1 UNITS: 100 INJECTION, SOLUTION INTRAVENOUS; SUBCUTANEOUS at 00:09

## 2020-01-29 RX ADMIN — AMLODIPINE BESYLATE 5 MG: 5 TABLET ORAL at 09:02

## 2020-01-29 RX ADMIN — INSULIN ASPART 1 UNITS: 100 INJECTION, SOLUTION INTRAVENOUS; SUBCUTANEOUS at 04:30

## 2020-01-29 RX ADMIN — PIPERACILLIN AND TAZOBACTAM 3.38 G: 3; .375 INJECTION, POWDER, LYOPHILIZED, FOR SOLUTION INTRAVENOUS at 09:18

## 2020-01-29 RX ADMIN — CEFEPIME HYDROCHLORIDE 2 G: 2 INJECTION, POWDER, FOR SOLUTION INTRAVENOUS at 19:30

## 2020-01-29 RX ADMIN — GABAPENTIN 1600 MG: 800 TABLET, FILM COATED ORAL at 16:20

## 2020-01-29 RX ADMIN — GABAPENTIN 1600 MG: 800 TABLET, FILM COATED ORAL at 21:28

## 2020-01-29 RX ADMIN — IPRATROPIUM BROMIDE AND ALBUTEROL SULFATE 3 ML: .5; 3 SOLUTION RESPIRATORY (INHALATION) at 07:49

## 2020-01-29 RX ADMIN — IPRATROPIUM BROMIDE AND ALBUTEROL SULFATE 3 ML: .5; 3 SOLUTION RESPIRATORY (INHALATION) at 15:35

## 2020-01-29 RX ADMIN — POLYETHYLENE GLYCOL 3350 17 G: 17 POWDER, FOR SOLUTION ORAL at 09:08

## 2020-01-29 RX ADMIN — POLYETHYLENE GLYCOL 3350 17 G: 17 POWDER, FOR SOLUTION ORAL at 06:26

## 2020-01-29 RX ADMIN — GABAPENTIN 1600 MG: 800 TABLET, FILM COATED ORAL at 09:03

## 2020-01-29 RX ADMIN — Medication: at 13:04

## 2020-01-29 RX ADMIN — PIPERACILLIN AND TAZOBACTAM 3.38 G: 3; .375 INJECTION, POWDER, LYOPHILIZED, FOR SOLUTION INTRAVENOUS at 01:56

## 2020-01-29 RX ADMIN — METHADONE HYDROCHLORIDE 20 MG: 10 TABLET ORAL at 09:03

## 2020-01-29 RX ADMIN — TRAZODONE HYDROCHLORIDE 100 MG: 100 TABLET ORAL at 23:56

## 2020-01-29 RX ADMIN — SODIUM CHLORIDE: 9 INJECTION, SOLUTION INTRAVENOUS at 01:57

## 2020-01-29 RX ADMIN — INSULIN ASPART 1 UNITS: 100 INJECTION, SOLUTION INTRAVENOUS; SUBCUTANEOUS at 23:59

## 2020-01-29 RX ADMIN — SENNOSIDES 1 TABLET: 8.6 TABLET, FILM COATED ORAL at 09:03

## 2020-01-29 RX ADMIN — PANTOPRAZOLE SODIUM 40 MG: 40 TABLET, DELAYED RELEASE ORAL at 16:20

## 2020-01-29 RX ADMIN — SENNOSIDES 1 TABLET: 8.6 TABLET, FILM COATED ORAL at 21:28

## 2020-01-29 RX ADMIN — INSULIN GLARGINE 27 UNITS: 100 INJECTION, SOLUTION SUBCUTANEOUS at 06:26

## 2020-01-29 ASSESSMENT — ACTIVITIES OF DAILY LIVING (ADL)
ADLS_ACUITY_SCORE: 27
ADLS_ACUITY_SCORE: 26
ADLS_ACUITY_SCORE: 26
ADLS_ACUITY_SCORE: 28

## 2020-01-29 NOTE — PLAN OF CARE
Pt is A and O X4. VSS on 2 L O2, per baseline. Reports severe abd and pack pain 8/10. Methadone not effective. MD aware: pca pump initiated. Tele:SR with BBB. Lungs dim. Denies SOB. Bowels active, pt denies feeling constipated despite not having a BM in a few days per pt report. Multiple bowel meds given this am, monitoring effectiveness.   All wound cares completed per POC. All 7 drains dressings changed,  yellow drainage present in all 3 JPs, and pus noted around the THAI site.  Malecot drain noted to have light brown to brown drainage. Surgical dressing changed and packed per POC. On TF at 60 ml/hr. Clear liquid diet. 60 ml flush q 4 hrs. BG checks q 4 hrs, insulin per sliding scale. Turned and repo as pt allowed, educated on importance of repositioning but continues to refuse as charted. Coccyx wound cares completed.  On bariatric pulsate mattress. ID, and surgery following. Zosyn stopped, cefepime started.  Continue to monitor.

## 2020-01-29 NOTE — PROGRESS NOTES
Bhavani White is a 64 year old woman with a history of obesity, fibromyalgia, chronic back pain on methadone and a perforated duodenal ulcer in 12/2019 status post 3 surgeries to repair with last exploratory laparotomy on January 1, 2020 s/p 2 mallencott drains, gastrostomy tube for decompression, jejunostomy tube for nutrition among other treatments, discharged form the hospital on 1/21/2020 and readmitted to the hospital on 1/26/2020 with abdominal pain and fevers.      CT was done which showed increase in size of a right and left upper abdomen fluid collections possible abscesses. CT guided drain was placed in each collection 1/27/2020 with a large amount of drainage, left 400 mL and Right 170 mL 1/27/2020.     She is being seen in IR follow up today.    S: Didn't sleep well last night because she was woken up during the night for cares. Continues with chronic abdominal pain but not worse today.     O: Temp:  [95.9  F (35.5  C)-96.6  F (35.9  C)] 95.9  F (35.5  C)  Pulse:  [68] 68  Heart Rate:  [69-80] 69  Resp:  [16] 16  BP: (123-146)/(61-67) 129/67  SpO2:  [93 %-97 %] 96 %    Labs, notes, imaging, IOs and VSs reviewed    ROUTINE ICU LABS (Last four results)  CMP  Recent Labs   Lab 01/29/20  0836 01/28/20  0729 01/27/20  0746 01/26/20  0821 01/25/20  2241   NA  --   --   --  138 136   POTASSIUM  --   --   --  4.0 3.7   CHLORIDE  --   --   --  97 94   CO2  --   --   --  41* 41*   ANIONGAP  --   --   --  <1* 1*   GLC  --   --   --  105* 144*   BUN  --   --   --  25 27   CR 0.55 0.56 0.65 0.65 0.62   GFRESTIMATED >90 >90 >90 >90 >90   GFRESTBLACK >90 >90 >90 >90 >90   DORY  --   --   --  10.2* 10.4*   MAG  --   --  1.9  --   --    PROTTOTAL  --   --   --  5.8* 6.3*   ALBUMIN  --   --   --  1.7* 1.8*   BILITOTAL  --   --   --  0.3 0.3   ALKPHOS  --   --   --  136 148   AST  --   --   --  22 22   ALT  --   --   --  20 22     CBC  Recent Labs   Lab 01/29/20  0836 01/27/20  0746 01/26/20  0821 01/25/20  2241   WBC 13.1*  9.3 11.5* 13.0*   RBC 3.70* 3.89 3.67* 3.61*   HGB 9.8* 10.2* 9.8* 9.7*   HCT 32.6* 34.6* 33.1* 32.2*   MCV 88 89 90 89   MCH 26.5 26.2* 26.7 26.9   MCHC 30.1* 29.5* 29.6* 30.1*   RDW 15.3* 14.9 15.1* 15.0    352 342 336     INR  Recent Labs   Lab 01/27/20  0826   INR 1.18*     Arterial Blood GasNo lab results found in last 7 days.    General-Lethargic-sleepy, oriented, appropriate in responses.   -Right and Left radiology drain dressings CD&I  -Both flushed easily with a total of 20 mL NS without pain or leaking and able to aspirate out what was flushed in.  -Output-Milky grey to serosanguinous with some whitish tissue bilaterally upon aspiration. .   Output-Left 1/27 400 mL; 1/28 135 mL; 1/29 50 so far today  Right 1/27 170  mL; 1/28 210 mL; 1/29 40 so far today  Cultures pending, gram stain some gram positive cocci and gram negative rods    A/P: Bhavani White is a 64 year old woman with a history of obesity, fibromyalgia, chronic back pain on methadone and a perforated duodenal ulcer in 12/2019 status post 3 surgeries to repair with last exploratory laparotomy on January 1, 2020 s/p 2 mallencott drains, gastrostomy tube for decompression, jejunostomy tube for nutrition among other treatments, discharged form the hospital on 1/21/2020 and readmitted to the hospital on 1/26/2020 with abdominal pain and fevers.      CT was done which showed increase in size of a right and left upper abdomen fluid collections possible abscesses. CT guided drain was placed in each collection 1/27/2020 with a large amount of drainage, left 400 mL and Right 170 mL 1/27/2020.     Radiology drains continue with good and decreased outputs. Afebrile, increased WBC today noted, unclear on source.     Will follow    Thanks [facility] Interventional Radiology CNP ([phone]) (phone [phone])

## 2020-01-29 NOTE — PROGRESS NOTES
"General Surgery Progress Note    Assessment and Plan:  64 year old female with complex anterior fluid collections; S/p Drainage per IR, also with UTI (pseudomonas)  Perforated duodenal ulcer, s/p ext lap, TRUONG, Malecot drain placement with perforation, and wound VAC on 2019. Repeat ex-lap, TRUONG, closure of duodenal perforation over Malecot, G-tube/J-tube placements, delayed primary closure on 2020     - Continue all drains  - WBC 9.3-->13.1, on Zosyn and Vanc, ID consulted  - WOC Rn following, appreciate your help  - Med mngt per hospitalist, appreciate your help  - Discussed with Dr. Lopez, continue current cares, no changes in plans    Interval Hx:   Overall feeling ok, sore around new drains, more awake than yesterday.    Exam:  Vitals: Blood pressure 129/67, pulse 68, temperature 95.9  F (35.5  C), temperature source Oral, resp. rate 16, height 1.651 m (5' 5\"), weight 121.8 kg (268 lb 8.3 oz), SpO2 96 %, not currently breastfeeding.  Temperature Temp  Av.3  F (35.7  C)  Min: 95.9  F (35.5  C)  Max: 96.6  F (35.9  C)   I/O last 3 completed shifts:  In: 1546 [I.V.:1546]  Out: 2975 [Urine:2550; Emesis/NG output:100; Drains:325]    Abd: soft, mildy ttp around new drains, nd. Incision open, fibrinous but clean. G tube venting, J tube feeds going, Malecot bilious, THAI drain serous, IR drains x2  purulent drainage.    Data:  Recent Labs   Lab Test 20  0836 20  0746 20  0821   WBC 13.1* 9.3 11.5*   HGB 9.8* 10.2* 9.8*   HCT 32.6* 34.6* 33.1*    352 342      Recent Labs   Lab Test 20  0836 20  0729 20  0746 20  0821 20  2241 20  0555   NA  --   --   --  138 136 137   POTASSIUM  --   --   --  4.0 3.7 3.6   CHLORIDE  --   --   --  97 94 96   CO2  --   --   --  41* 41* 40*   BUN  --   --   --  25 27 25   CR 0.55 0.56 0.65 0.65 0.62 0.77     Esther Bonilla PA-C  [facility]  [phone]  "

## 2020-01-29 NOTE — PLAN OF CARE
Patient is A/ox4. VSS on 2L O2 (baseline RA) except hypertensive. C/o back, L hip, and abdominal pain, scheduled methadone and IV PRN Dilaudid given x2. Tele SD w/ BBB. LS diminished. Patient continues to c/o shortness of breath, scheduled neb given x2. abd wound cares completed per WOC orders on dayshift/labeled, dressing scant drg. Multiples (x7) drains - 4x4 dressing to site with small amount of dried yellow drainage. THAI drains placed yesterday with milky, tan output. Clear liquid diet for comfort. J-tube running at goal rate 60ml/hr with q4h 60mL water flushes. BGM sl elevated, covered. Coccyx and posterior thighs excoriated per report, pt refused assessment, Turn and repo q2h, patient refuses at times. Bariatric pulsate mattress in place. ID consulted, patient seen today.  Plan to continued with current IV abx regimen and await cultures. Nursing to continue to monitor.

## 2020-01-29 NOTE — PHARMACY-VANCOMYCIN DOSING SERVICE
Pharmacy Vancomycin Note  Date of Service 2020  Patient's  1955   64 year old, female    Indication: Intra-abdominal infection  Goal Trough Level: 15-20 mg/L  Day of Therapy: 3  Current Vancomycin regimen:  2000 mg IV intermittent per levels    Current estimated CrCl = Estimated Creatinine Clearance: 134.6 mL/min (based on SCr of 0.56 mg/dL).    Creatinine for last 3 days  2020: 10:41 PM Creatinine 0.62 mg/dL  2020:  8:21 AM Creatinine 0.65 mg/dL  2020:  7:46 AM Creatinine 0.65 mg/dL  2020:  7:29 AM Creatinine 0.56 mg/dL    Recent Vancomycin Levels (past 3 days)  2020:  2:28 PM Vancomycin Level 19.1 mg/L  2020:  9:18 PM Vancomycin Level 14.4 mg/L  2020:  4:33 PM Vancomycin Level 17.6 mg/L    Vancomycin IV Administrations (past 72 hours)                   vancomycin (VANCOCIN) 2,000 mg in sodium chloride 0.9 % 500 mL intermittent infusion (mg) 2,000 mg New Bag 20 2255    vancomycin (VANCOCIN) 2,000 mg in sodium chloride 0.9 % 500 mL intermittent infusion (mg) 2,000 mg New Bag 20 2100                Nephrotoxins and other renal medications (From now, onward)    Start     Dose/Rate Route Frequency Ordered Stop    20 1900  vancomycin (VANCOCIN) 2,000 mg in sodium chloride 0.9 % 500 mL intermittent infusion      2,000 mg  over 2 Hours Intravenous EVERY 18 HOURS 20 1827      20 0800  piperacillin-tazobactam (ZOSYN) 3.375 g vial to attach to  mL bag     Note to Pharmacy:  Pharmacy can adjust dose based on renal function.    3.375 g  over 1 Hours Intravenous EVERY 6 HOURS 20 0259               Contrast Orders - past 72 hours (72h ago, onward)    Start     Dose/Rate Route Frequency Ordered Stop    20 2332  iopamidol (ISOVUE-370) solution 125 mL      125 mL Intravenous ONCE 20 2332 20 2348          Interpretation of levels and current regimen:  Random 18hr level is  Therapeutic    Has serum creatinine changed >  50% in last 72 hours: No    Urine output:  adequate    Renal Function: Stable    Plan:  1.  Schedule Vanco 2g IV q18h  2.  Pharmacy will check trough levels as appropriate in 1-3 Days.      Thu Hinojosa RPH        .

## 2020-01-29 NOTE — PROGRESS NOTES
[facility]    Infectious Disease Progress Note    Date of Service (when I saw the patient): 01/29/2020     Assessment & Plan   Bhavani White is a 64 year old female who was admitted on 1/25/2020.       Impression:  1. 64 y.o who was recently admitted to the hospital was discharged on 21 January, that was a prolonged stay for perforated duodenal ulcer, s/p ex lap, TRUONG, Malecot drain placement with perforation, and wound VAC on 12/29/2019. Repeat ex-lap, TRUONG, closure of duodenal perforation over Malecot, G-tube/J-tube placements, delayed primary closure on 1/1/2020.  2. Admitted this occasion with abdominal pain, drainage from the G tube site.   3. CT scan with two large abscess cavities.   4. S/P multiple drain placement.   5. 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   6. She is on IV vancomycin and zosyn.   7. Diabetes.      Recommendations:   Continue on the broad spectrum antibiotics.   Follow up on the full information from the cultures.     Chacho Solis MD    Interval History   Feels tiered   Sleepy   No fever     Physical Exam   Temp: 95.9  F (35.5  C) Temp src: Oral BP: 129/67 Pulse: 68 Heart Rate: 69 Resp: 16 SpO2: 96 % O2 Device: Nasal cannula Oxygen Delivery: 2 LPM  Vitals:    01/25/20 2224 01/26/20 0502 01/27/20 0600   Weight: 113.4 kg (250 lb) 122.2 kg (269 lb 6.4 oz) 121.8 kg (268 lb 8.3 oz)     Vital Signs with Ranges  Temp:  [95.9  F (35.5  C)-96.6  F (35.9  C)] 95.9  F (35.5  C)  Pulse:  [68] 68  Heart Rate:  [69-80] 69  Resp:  [16] 16  BP: (123-146)/(61-67) 129/67  SpO2:  [93 %-97 %] 96 %    Constitutional: Awake, alert, cooperative, no apparent distress  Lungs: Clear to auscultation bilaterally, no crackles or wheezing  Cardiovascular: Regular rate and rhythm, normal S1 and S2, and no murmur noted  Abdomen: multiple drains   Skin: No rashes, no cyanosis, no edema  Other:    Medications     dextrose       sodium chloride 100 mL/hr at  01/29/20 0157       amLODIPine  5 mg Oral or Feeding Tube Daily     DULoxetine  120 mg Oral QAM     gabapentin  1,600 mg Oral or Feeding Tube TID     insulin aspart  1-6 Units Subcutaneous Q4H     insulin glargine  27 Units Subcutaneous QAM AC     ipratropium - albuterol 0.5 mg/2.5 mg/3 mL  3 mL Nebulization 4x daily     levothyroxine  88 mcg Oral or Feeding Tube QAM AC     methadone  20 mg Oral or Feeding Tube BID    Or     methadone  20 mg Oral BID     pantoprazole  40 mg Oral BID AC    Or     pantoprazole  40 mg Oral or Feeding Tube BID AC     piperacillin-tazobactam  3.375 g Intravenous Q6H     polyethylene glycol  17 g Oral Daily     sennosides  1 tablet Oral BID     simvastatin  20 mg Oral or Feeding Tube At Bedtime     sodium chloride (PF)  10 mL Irrigation Q8H     sodium chloride (PF)  10 mL Irrigation Q8H     sodium chloride (PF)  3 mL Intracatheter Q8H     vancomycin (VANCOCIN) IV  2,000 mg Intravenous Q18H       Data   All microbiology laboratory data reviewed.  Recent Labs   Lab Test 01/29/20  0836 01/27/20  0746 01/26/20  0821   WBC 13.1* 9.3 11.5*   HGB 9.8* 10.2* 9.8*   HCT 32.6* 34.6* 33.1*   MCV 88 89 90    352 342     Recent Labs   Lab Test 01/29/20  0836 01/28/20  0729 01/27/20  0746   CR 0.55 0.56 0.65     No lab results found.  Recent Labs   Lab Test 01/27/20  1035 01/27/20  1000 01/25/20  2336 01/25/20  2241 01/04/20  1455 01/02/20  1233 12/30/19  0425 12/29/19  1507 12/29/19  1435   CULT Culture in progress  Culture negative monitoring continues Culture in progress  Culture negative monitoring continues 50,000 to 100,000 colonies/mL  Pseudomonas aeruginosa  *  No growth after 3 days Heavy growth  Methicillin resistant Staphylococcus aureus (MRSA)  *  Heavy growth  Streptococcus mitis group  Susceptibility testing not routinely done  *  Heavy growth  Haemophilus parainfluenzae  Susceptibility testing not routinely done  *  No growth after 3 days No growth Cultured on the 1st day of  incubation:  Staphylococcus hominis  *  Critical Value/Significant Value, preliminary result only, called to and read back by  [facility] @7121 AE    Susceptibility testing done on previous specimen Methicillin resistant Staphylococcus aureus (MRSA) isolated* No growth Cultured on the 1st day of incubation:  Staphylococcus hominis  *  Critical Value/Significant Value, preliminary result only, called to and read back by  Suhail Escobar RN from [facility] ICU. 12/30/19 at 1202.TV.    Cultured on the 1st day of incubation:  Staphylococcus epidermidis  *  (Note)  POSITIVE for Staphylococci other than S.aureus, S.epidermidis and  S.lugdunensis, by XSI Semi Conductorsigene multiplex nucleic acid test.  Coagulase-negative staphylococci are the most common venipuncture or  collection associated skin CONTAMINANTS grown in blood cultures.  Final identification and antimicrobial susceptibility testing will be  verified by standard methods.    Specimen tested with Verigene multiplex, gram-positive blood culture  nucleic acid test for the following targets: Staph aureus, Staph  epidermidis, Staph lugdunensis, other Staph species, Enterococcus  faecalis, Enterococcus faecium, Streptococcus species, S. agalactiae,  S. anginosus grp., S. pneumoniae, S. pyogenes, Listeria sp., mecA  (methicillin resistance) and Nick/B (vancomycin resistance).    Critical Value/Significant Value called to and read back by SUHAIL ESCOBAR RN 12/30/2019 @ 9541 BC         Attestation:  Total time on the floor involved in the patient's care: 35 minutes. Total time spent in counseling/care coordination: >50%

## 2020-01-29 NOTE — PROGRESS NOTES
[facility]    Hospitalist Progress Note    Assessment & Plan   Bhavani White is a 64 year old female who was admitted on 1/25/2020.  Patient had a long stay at [facility] and was discharged on 21 January, reported back on 26th May due to increased abdominal abscess.    Complex anterior upper abdominal fluid collections, recent discharge on January 21, 2020 status post perforated duodenal ulcer status post exploratory laparotomy primary closure delayed on January 1, 2020: Patient with recent discharge on January 21 of 2020 for a perforated duodenal ulcer and associated hemorrhagic shock and bowel ischemia status post exploratory laparotomy with drain placement and wound VAC.  Per report, patient with GJ tube and yellow discharge leaking around the site this afternoon prior to admission.  However, staff at facility noted more discharge and decided to call EMS, patient sent to the emergency department for further evaluation and treatment.  CT on admission shows complex anterior upper abdominal fluid collections more loculated than previous, likely developing abscesses with increased volume, communication with left upper quadrant collection to the multiloculated anterior epigastric collection, increased ascites, pleural effusions, suprapubic bladder catheter and percutaneous jejunostomy, see report for further details.  - Appreciate surgery input, consulted interventional radiology, she underwent drain placement 1/27  - Continue IV antibiotics; pharmacy to dose Vanc. Changed Zosyn to Cefepime.  - restarted tube feeding and her PTA meds.  - per IR Recommend a follow up CT once outputs from pigtail drains are < 20 a day.   - starting dilaudid PCA for better pain control. Discussed with pharm - okay to continue baseline methadone.  - starting more aggressive bowel regimen. No BM.    Cardiac, hyperlipidemia, hypertension: Patient maintained on statin prior to admission.  Previous echocardiogram  with EF at 60 to 65%, right ventricular global function probably normal, per report.  -Continue simvastatin, amlodipine and Lasix depending on fluid status    Pseudomonas in urine: defer to ID whether this is colonizer vs pathogenic. 50-100k organisms.     Diabetes mellitus, insulin-dependent: Patient maintained on Lantus prior to admission.  -Continue prior to admission Lantus 27 units subcutaneous every morning when verified by pharmacy.  -Insulin sliding scale, medium.     Hypothyroidism: Stable.  -Continue prior to admission levothyroxine      Chronic pain, on chronic methadone, anxiety, depression: Patient maintained on methadone prior to admission.  -PTA medications were restarted including gabapentin, methadone and Cymbalta.     GERD: Stable.  -Continue prior to admission pantoprazole when verified by pharmacy.     Insomnia: Stable.  -Continue prior to admission trazodone when verified by pharmacy.    DVT Prophylaxis: sequential compression device   Code Status: Full Code     Disposition: anticipate prolonged hospitalization    Morelia Lin MD    Interval History   Patient reports a lot of pain. Poor sleep due to pain. Pain is persistently 8-9/10. Followed up after start of dilaudid PCA and she was feeling much better. No BM since admission. She is also unclear on plan for removal of drains and mildly frustrated about that. More frustrated about pain. Requesting milk to calm her stomach.      -Data reviewed today: I reviewed all new labs and imaging results over the last 24 hours.     Physical Exam   Temp: 97.6  F (36.4  C) Temp src: Oral BP: (!) 149/72 Pulse: 68 Heart Rate: 81 Resp: 16 SpO2: 92 % O2 Device: Nasal cannula Oxygen Delivery: 2 LPM  Vitals:    01/25/20 2224 01/26/20 0502 01/27/20 0600   Weight: 113.4 kg (250 lb) 122.2 kg (269 lb 6.4 oz) 121.8 kg (268 lb 8.3 oz)     Vital Signs with Ranges  Temp:  [95.9  F (35.5  C)-97.6  F (36.4  C)] 97.6  F (36.4  C)  Pulse:  [68] 68  Heart Rate:  [69-81]  81  Resp:  [16] 16  BP: (123-149)/(61-72) 149/72  SpO2:  [92 %-97 %] 92 %  I/O last 3 completed shifts:  In: 890 [P.O.:90; I.V.:800]  Out: 4265 [Urine:3800; Emesis/NG output:150; Drains:315]    Constitutional: Awake, alert, cooperative, no apparent distress. Morbidly obese.  Respiratory: Basilar crackles noted  Cardiovascular: Regular rate and rhythm, normal S1 and S2, and no murmur noted  GI: G-tube noted suprapubic catheter noted, she has healing incision, has multiple THAI drains placed by IR, bowel sounds are reduced.  Skin/Integumen: No rashes, no cyanosis, 1+ edema  Neuro : moving all 4 extremities, no focal deficit noted     Medications     dextrose       HYDROmorphone         amLODIPine  5 mg Oral or Feeding Tube Daily     ceFEPIme (MAXIPIME) IV  2 g Intravenous Q8H     DULoxetine  120 mg Oral QAM     gabapentin  1,600 mg Oral or Feeding Tube TID     insulin aspart  1-6 Units Subcutaneous Q4H     insulin glargine  27 Units Subcutaneous QAM AC     ipratropium - albuterol 0.5 mg/2.5 mg/3 mL  3 mL Nebulization 4x daily     levothyroxine  88 mcg Oral or Feeding Tube QAM AC     methadone  20 mg Oral or Feeding Tube BID    Or     methadone  20 mg Oral BID     pantoprazole  40 mg Oral BID AC    Or     pantoprazole  40 mg Oral or Feeding Tube BID AC     polyethylene glycol  17 g Oral Daily     sennosides  1 tablet Oral BID     simvastatin  20 mg Oral or Feeding Tube At Bedtime     sodium chloride (PF)  10 mL Irrigation Q8H     sodium chloride (PF)  10 mL Irrigation Q8H     sodium chloride (PF)  3 mL Intracatheter Q8H     vancomycin (VANCOCIN) IV  2,000 mg Intravenous Q18H       Data   Recent Labs   Lab 01/29/20  0836 01/28/20  0729 01/27/20  0826 01/27/20  0746 01/26/20  0821 01/25/20  2241   WBC 13.1*  --   --  9.3 11.5* 13.0*   HGB 9.8*  --   --  10.2* 9.8* 9.7*   MCV 88  --   --  89 90 89     --   --  352 342 336   INR  --   --  1.18*  --   --   --    NA  --   --   --   --  138 136   POTASSIUM  --   --   --    --  4.0 3.7   CHLORIDE  --   --   --   --  97 94   CO2  --   --   --   --  41* 41*   BUN  --   --   --   --  25 27   CR 0.55 0.56  --  0.65 0.65 0.62   ANIONGAP  --   --   --   --  <1* 1*   DORY  --   --   --   --  10.2* 10.4*   GLC  --   --   --   --  105* 144*   ALBUMIN  --   --   --   --  1.7* 1.8*   PROTTOTAL  --   --   --   --  5.8* 6.3*   BILITOTAL  --   --   --   --  0.3 0.3   ALKPHOS  --   --   --   --  136 148   ALT  --   --   --   --  20 22   AST  --   --   --   --  22 22   LIPASE  --   --   --   --   --  164     Recent Labs   Lab 01/29/20  1204 01/29/20  0823 01/29/20  0403 01/29/20  0008 01/28/20 2057  01/26/20  0821  01/25/20  2241   GLC  --   --   --   --   --   --  105*  --  144*   * 131* 144* 143* 161*   < >  --    < >  --     < > = values in this interval not displayed.       Imaging:   No results found for this or any previous visit (from the past 24 hour(s)).

## 2020-01-29 NOTE — PLAN OF CARE
"Pt slept well overnight. On 2L oxygen, which is baseline. C/o back and abd pain, IV dilaudid given x 1. Tele NSR with BBB. LS dim. Multiple drains (7) on abd. Malecott dressing changed overnight. 2 new THAI drains with milky, tan output. TF infusing thru J tube at 60 ml/hr. Pt refused reposition overnight despite encouragement as she \"was comfortable and didn't want to move\". On pulsate mattress. Pt states she hasn't had a BM in a week, gave miralax this am.   "

## 2020-01-30 LAB
ANION GAP SERPL CALCULATED.3IONS-SCNC: 3 MMOL/L (ref 3–14)
BUN SERPL-MCNC: 20 MG/DL (ref 7–30)
CALCIUM SERPL-MCNC: 9.8 MG/DL (ref 8.5–10.1)
CHLORIDE SERPL-SCNC: 101 MMOL/L (ref 94–109)
CO2 SERPL-SCNC: 36 MMOL/L (ref 20–32)
CREAT SERPL-MCNC: 0.54 MG/DL (ref 0.52–1.04)
ERYTHROCYTE [DISTWIDTH] IN BLOOD BY AUTOMATED COUNT: 15.3 % (ref 10–15)
GFR SERPL CREATININE-BSD FRML MDRD: >90 ML/MIN/{1.73_M2}
GLUCOSE BLDC GLUCOMTR-MCNC: 125 MG/DL (ref 70–99)
GLUCOSE BLDC GLUCOMTR-MCNC: 134 MG/DL (ref 70–99)
GLUCOSE BLDC GLUCOMTR-MCNC: 142 MG/DL (ref 70–99)
GLUCOSE BLDC GLUCOMTR-MCNC: 147 MG/DL (ref 70–99)
GLUCOSE BLDC GLUCOMTR-MCNC: 149 MG/DL (ref 70–99)
GLUCOSE BLDC GLUCOMTR-MCNC: 96 MG/DL (ref 70–99)
GLUCOSE SERPL-MCNC: 143 MG/DL (ref 70–99)
HCT VFR BLD AUTO: 35.2 % (ref 35–47)
HGB BLD-MCNC: 10.4 G/DL (ref 11.7–15.7)
MCH RBC QN AUTO: 26.2 PG (ref 26.5–33)
MCHC RBC AUTO-ENTMCNC: 29.5 G/DL (ref 31.5–36.5)
MCV RBC AUTO: 89 FL (ref 78–100)
PLATELET # BLD AUTO: 361 10E9/L (ref 150–450)
POTASSIUM SERPL-SCNC: 3.7 MMOL/L (ref 3.4–5.3)
RBC # BLD AUTO: 3.97 10E12/L (ref 3.8–5.2)
SODIUM SERPL-SCNC: 140 MMOL/L (ref 133–144)
WBC # BLD AUTO: 15.1 10E9/L (ref 4–11)

## 2020-01-30 PROCEDURE — 85027 COMPLETE CBC AUTOMATED: CPT | Performed by: HOSPITALIST

## 2020-01-30 PROCEDURE — 27210437 ZZH NUTRITION PRODUCT SEMIELEM INTERMED LITER

## 2020-01-30 PROCEDURE — 25000131 ZZH RX MED GY IP 250 OP 636 PS 637: Mod: GY | Performed by: INTERNAL MEDICINE

## 2020-01-30 PROCEDURE — 25000125 ZZHC RX 250: Performed by: HOSPITALIST

## 2020-01-30 PROCEDURE — 25000128 H RX IP 250 OP 636: Performed by: HOSPITALIST

## 2020-01-30 PROCEDURE — 25000128 H RX IP 250 OP 636

## 2020-01-30 PROCEDURE — 36415 COLL VENOUS BLD VENIPUNCTURE: CPT | Performed by: HOSPITALIST

## 2020-01-30 PROCEDURE — 00000146 ZZHCL STATISTIC GLUCOSE BY METER IP

## 2020-01-30 PROCEDURE — 40000275 ZZH STATISTIC RCP TIME EA 10 MIN

## 2020-01-30 PROCEDURE — 25000128 H RX IP 250 OP 636: Performed by: INTERNAL MEDICINE

## 2020-01-30 PROCEDURE — 25800030 ZZH RX IP 258 OP 636: Performed by: INTERNAL MEDICINE

## 2020-01-30 PROCEDURE — 80048 BASIC METABOLIC PNL TOTAL CA: CPT | Performed by: HOSPITALIST

## 2020-01-30 PROCEDURE — 94640 AIRWAY INHALATION TREATMENT: CPT

## 2020-01-30 PROCEDURE — 25000132 ZZH RX MED GY IP 250 OP 250 PS 637: Mod: GY | Performed by: INTERNAL MEDICINE

## 2020-01-30 PROCEDURE — 25000132 ZZH RX MED GY IP 250 OP 250 PS 637: Mod: GY

## 2020-01-30 PROCEDURE — 25000132 ZZH RX MED GY IP 250 OP 250 PS 637: Mod: GY | Performed by: HOSPITALIST

## 2020-01-30 PROCEDURE — 25800030 ZZH RX IP 258 OP 636

## 2020-01-30 PROCEDURE — 99232 SBSQ HOSP IP/OBS MODERATE 35: CPT | Performed by: HOSPITALIST

## 2020-01-30 PROCEDURE — 12000000 ZZH R&B MED SURG/OB

## 2020-01-30 PROCEDURE — 94640 AIRWAY INHALATION TREATMENT: CPT | Mod: 76

## 2020-01-30 RX ORDER — SENNOSIDES 8.6 MG
2 TABLET ORAL 2 TIMES DAILY
Status: DISCONTINUED | OUTPATIENT
Start: 2020-01-30 | End: 2020-02-12 | Stop reason: HOSPADM

## 2020-01-30 RX ORDER — FUROSEMIDE 40 MG
40 TABLET ORAL DAILY
Status: DISCONTINUED | OUTPATIENT
Start: 2020-01-31 | End: 2020-02-12 | Stop reason: HOSPADM

## 2020-01-30 RX ORDER — PIPERACILLIN SODIUM, TAZOBACTAM SODIUM 3; .375 G/15ML; G/15ML
3.38 INJECTION, POWDER, LYOPHILIZED, FOR SOLUTION INTRAVENOUS EVERY 6 HOURS
Status: DISCONTINUED | OUTPATIENT
Start: 2020-01-30 | End: 2020-02-12 | Stop reason: HOSPADM

## 2020-01-30 RX ORDER — FUROSEMIDE 10 MG/ML
40 INJECTION INTRAMUSCULAR; INTRAVENOUS
Status: DISCONTINUED | OUTPATIENT
Start: 2020-01-30 | End: 2020-01-30

## 2020-01-30 RX ADMIN — FUROSEMIDE 40 MG: 10 INJECTION, SOLUTION INTRAVENOUS at 10:23

## 2020-01-30 RX ADMIN — MICAFUNGIN SODIUM 100 MG: 10 INJECTION, POWDER, LYOPHILIZED, FOR SOLUTION INTRAVENOUS at 13:33

## 2020-01-30 RX ADMIN — IPRATROPIUM BROMIDE AND ALBUTEROL SULFATE 3 ML: .5; 3 SOLUTION RESPIRATORY (INHALATION) at 19:22

## 2020-01-30 RX ADMIN — PIPERACILLIN AND TAZOBACTAM 3.38 G: 3; .375 INJECTION, POWDER, LYOPHILIZED, FOR SOLUTION INTRAVENOUS at 11:18

## 2020-01-30 RX ADMIN — PIPERACILLIN AND TAZOBACTAM 3.38 G: 3; .375 INJECTION, POWDER, LYOPHILIZED, FOR SOLUTION INTRAVENOUS at 16:32

## 2020-01-30 RX ADMIN — VANCOMYCIN HYDROCHLORIDE 2000 MG: 1 INJECTION, POWDER, LYOPHILIZED, FOR SOLUTION INTRAVENOUS at 06:27

## 2020-01-30 RX ADMIN — GABAPENTIN 1600 MG: 800 TABLET, FILM COATED ORAL at 21:29

## 2020-01-30 RX ADMIN — TRAZODONE HYDROCHLORIDE 100 MG: 100 TABLET ORAL at 21:58

## 2020-01-30 RX ADMIN — PIPERACILLIN AND TAZOBACTAM 3.38 G: 3; .375 INJECTION, POWDER, LYOPHILIZED, FOR SOLUTION INTRAVENOUS at 21:30

## 2020-01-30 RX ADMIN — METHADONE HYDROCHLORIDE 20 MG: 10 TABLET ORAL at 20:38

## 2020-01-30 RX ADMIN — GABAPENTIN 1600 MG: 800 TABLET, FILM COATED ORAL at 16:30

## 2020-01-30 RX ADMIN — POLYETHYLENE GLYCOL 3350 17 G: 17 POWDER, FOR SOLUTION ORAL at 08:57

## 2020-01-30 RX ADMIN — PANTOPRAZOLE SODIUM 40 MG: 40 TABLET, DELAYED RELEASE ORAL at 16:30

## 2020-01-30 RX ADMIN — IPRATROPIUM BROMIDE AND ALBUTEROL SULFATE 3 ML: .5; 3 SOLUTION RESPIRATORY (INHALATION) at 10:29

## 2020-01-30 RX ADMIN — AMLODIPINE BESYLATE 5 MG: 5 TABLET ORAL at 08:57

## 2020-01-30 RX ADMIN — CEFEPIME HYDROCHLORIDE 2 G: 2 INJECTION, POWDER, FOR SOLUTION INTRAVENOUS at 02:47

## 2020-01-30 RX ADMIN — INSULIN ASPART 1 UNITS: 100 INJECTION, SOLUTION INTRAVENOUS; SUBCUTANEOUS at 11:52

## 2020-01-30 RX ADMIN — SIMVASTATIN 20 MG: 20 TABLET, FILM COATED ORAL at 21:29

## 2020-01-30 RX ADMIN — Medication: at 21:06

## 2020-01-30 RX ADMIN — METHADONE HYDROCHLORIDE 20 MG: 10 TABLET ORAL at 08:57

## 2020-01-30 RX ADMIN — IPRATROPIUM BROMIDE AND ALBUTEROL SULFATE 3 ML: .5; 3 SOLUTION RESPIRATORY (INHALATION) at 14:32

## 2020-01-30 RX ADMIN — BISACODYL 10 MG: 10 SUPPOSITORY RECTAL at 20:40

## 2020-01-30 RX ADMIN — INSULIN ASPART 1 UNITS: 100 INJECTION, SOLUTION INTRAVENOUS; SUBCUTANEOUS at 09:02

## 2020-01-30 RX ADMIN — DULOXETINE HYDROCHLORIDE 120 MG: 60 CAPSULE, DELAYED RELEASE ORAL at 08:57

## 2020-01-30 RX ADMIN — SENNOSIDES 2 TABLET: 8.6 TABLET, FILM COATED ORAL at 20:38

## 2020-01-30 RX ADMIN — SENNOSIDES 1 TABLET: 8.6 TABLET, FILM COATED ORAL at 08:57

## 2020-01-30 RX ADMIN — LEVOTHYROXINE SODIUM 88 MCG: 88 TABLET ORAL at 06:41

## 2020-01-30 RX ADMIN — ALBUTEROL SULFATE 2.5 MG: 2.5 SOLUTION RESPIRATORY (INHALATION) at 06:36

## 2020-01-30 RX ADMIN — INSULIN GLARGINE 27 UNITS: 100 INJECTION, SOLUTION SUBCUTANEOUS at 09:01

## 2020-01-30 RX ADMIN — GABAPENTIN 1600 MG: 800 TABLET, FILM COATED ORAL at 08:57

## 2020-01-30 RX ADMIN — PANTOPRAZOLE SODIUM 40 MG: 40 TABLET, DELAYED RELEASE ORAL at 06:41

## 2020-01-30 ASSESSMENT — ACTIVITIES OF DAILY LIVING (ADL)
ADLS_ACUITY_SCORE: 28
ADLS_ACUITY_SCORE: 26
ADLS_ACUITY_SCORE: 28
ADLS_ACUITY_SCORE: 28

## 2020-01-30 ASSESSMENT — MIFFLIN-ST. JEOR: SCORE: 1762.45

## 2020-01-30 NOTE — PLAN OF CARE
Pt. A/Ox4. VSS on 2L, wean as able. C/o 7-8/10 abdominal and back pain (chronic), started PCA pump this afternoon, mild improvement. Pt refusing capno, on cont pulse ox. TF through J-tube running @ GR 60mL/hr. All 7 drains, dressings CDI. Thick milky output from #1 and #2 THAI drains. Mepilex in place on coccyx. Shampooed/combed hair. Still waiting for BM, multiple meds given this AM, BS active. Clear liquid diet. BG checks q4h. T/R, pt refusing, educated on importance of shifting weight to prevent further wounds.  Bariatric pulsate mattress. Lactic fired, came back at 1.3. Tele: SR with BBB and PVC. Continue to control pain and monitor drains.

## 2020-01-30 NOTE — PROGRESS NOTES
CLINICAL NUTRITION SERVICES - REASSESSMENT NOTE      Malnutrition: (1/27)  % Weight Loss:  None noted  % Intake:  No decreased intake noted (has been receiving TF)  Subcutaneous Fat Loss:  None observed  Muscle Loss:  None observed  Fluid Retention:  None noted     Malnutrition Diagnosis: Patient does not meet two of the above criteria necessary for diagnosing malnutrition         EVALUATION OF PROGRESS TOWARD GOALS   Diet:    Clear liquids  Taking some liquids as desired    Nutrition Support:    Type of Feeding Tube: J-tube  Enteral Frequency:  Continuous  Enteral Regimen: Peptamen Intense VHP at 60 mL/hr (goal rate)  Total Enteral Provisions: 1440 kcal (12 kcal/kg), 132 g protein (2.5 g/kg), 1210 mL H2O, 7 g fiber, 109 g CHO, 96% RDI  Water flushes: 60 mL every 4 hrs  TF + flushes = 1570 mL free water/day    Intake/Tolerance:    Pt tolerating TF at goal rate  No BM since admit - on miralax, senokot  BGM: 142 (medium sliding scale insulin, lantus before breakfast)  IVF d/c'd yesterday  1+ edema  On lasix      ASSESSED NUTRITION NEEDS:  Dosing Weight 121.8 kg (1/27 wt for energy), 56.8 kg (IBW for protein)  Estimated Energy Needs: 0363-6570 kcals (11-14 Kcal/Kg)  Justification: obese  Estimated Protein Needs: 115-145 grams protein (2-2.5 g pro/Kg)  Justification: hypercatabolism with acute illness and obesity guidelines          NEW FINDINGS:   C/o moderate abdominal & back pain --> PCA pump    Vitals:    01/25/20 2224 01/26/20 0502 01/27/20 0600 01/30/20 0651   Weight: 113.4 kg (250 lb) 122.2 kg (269 lb 6.4 oz) 121.8 kg (268 lb 8.3 oz) 120.7 kg (266 lb)         Previous Goals (1/27):   Pt to receive nutrition in the next 48 hrs  Evaluation: Met    Previous Nutrition Diagnosis (1/27):   Inadequate protein-energy intake related to current NPO status as evidenced by pt meeting 0% est needs  Evaluation: Improving      CURRENT NUTRITION DIAGNOSIS  No nutrition diagnosis identified at this time        INTERVENTIONS  Recommendations / Nutrition Prescription  Clear liquids as tolerates  TF as above    Implementation  No intervention at this time    Goals  EN to meet % est needs      MONITORING AND EVALUATION:  Progress towards goals will be monitored and evaluated per protocol and Practice Guidelines

## 2020-01-30 NOTE — PROGRESS NOTES
[facility]    Hospitalist Progress Note    Assessment & Plan   Bhavani White is a 64 year old female who was admitted on 1/25/2020.  Patient had a long stay at [facility] and was discharged on 21 January, reported back on 26th May due to increased abdominal abscess.    Complex anterior upper abdominal fluid collections, recent discharge on January 21, 2020 status post perforated duodenal ulcer status post exploratory laparotomy primary closure delayed on January 1, 2020: Patient with recent discharge on January 21 of 2020 for a perforated duodenal ulcer and associated hemorrhagic shock and bowel ischemia status post exploratory laparotomy with drain placement and wound VAC.  Per report, patient with GJ tube and yellow discharge leaking around the site this afternoon prior to admission.  However, staff at facility noted more discharge and decided to call EMS, patient sent to the emergency department for further evaluation and treatment.  CT on admission shows complex anterior upper abdominal fluid collections more loculated than previous, likely developing abscesses with increased volume, communication with left upper quadrant collection to the multiloculated anterior epigastric collection, increased ascites, pleural effusions, suprapubic bladder catheter and percutaneous jejunostomy, see report for further details.  - Appreciate surgery input, consulted interventional radiology, she underwent additional drain placements1/27  - Continue IV Vancomycin and Zosyn. Micafungin added today (1/30). ID following.  - restarted tube feeding and her PTA meds.  - CT abdomen tomorrow given increased pus around RUQ THAI drain and increasing leukocytosis.  - starting dilaudid PCA for better pain control. Discussed with pharm - okay to continue baseline methadone. Pt notes no improvement in pain but nursing notes somnolence and increased oxygen needs. Will leave at current settings.  - starting more  aggressive bowel regimen. No BM yet.    Cardiac, hyperlipidemia, hypertension: Patient maintained on statin prior to admission.  Previous echocardiogram with EF at 60 to 65%, right ventricular global function probably normal, per report.  -Continue simvastatin and amlodipine  - pt is up 16 lbs since admission. Diuresing but very carefully given Vanc/Zosyn and risk of kidney injury.    Pseudomonas in urine: defer to ID whether this is colonizer vs pathogenic. 50-100k organisms.     Diabetes mellitus, insulin-dependent: Patient maintained on Lantus prior to admission. Good glycemic control here.  -Continue prior to admission Lantus 27 units subcutaneous every morning  -Insulin sliding scale, medium.     Hypothyroidism: Stable.  -Continue prior to admission levothyroxine      Chronic pain, on chronic methadone, anxiety, depression: Patient maintained on methadone prior to admission.  -PTA medications were restarted including gabapentin, methadone and Cymbalta.     GERD: Stable.  -Continue PPI     Insomnia: Stable.  -Continue prior to admission trazodone    DVT Prophylaxis: sequential compression device   Code Status: Full Code     Disposition: anticipate prolonged hospitalization    Morelia Lin MD    Interval History   Patient reports that her pain is not improved on dilaudid PCA. Discussed with bedside RN who says pt is sleeping all day and requiring increased oxygen since start of PCA. Patient appears very comfortable. Reports pain as 7/10. Still no BM. While I was in the room, pt's  was being led out and transported to the ED.      -Data reviewed today: I reviewed all new labs and imaging results over the last 24 hours.     Physical Exam   Temp: 96.5  F (35.8  C) Temp src: Oral BP: 125/52 Pulse: 75 Heart Rate: 81 Resp: 16 SpO2: 96 % O2 Device: Nasal cannula Oxygen Delivery: 2 LPM  Vitals:    01/26/20 0502 01/27/20 0600 01/30/20 0651   Weight: 122.2 kg (269 lb 6.4 oz) 121.8 kg (268 lb 8.3 oz) 120.7 kg  (266 lb)     Vital Signs with Ranges  Temp:  [96  F (35.6  C)-98.5  F (36.9  C)] 96.5  F (35.8  C)  Pulse:  [75-81] 75  Heart Rate:  [81] 81  Resp:  [16-22] 16  BP: (118-149)/(52-72) 125/52  SpO2:  [92 %-97 %] 96 %  I/O last 3 completed shifts:  In: 90 [P.O.:90]  Out: 4315 [Urine:4000; Emesis/NG output:100; Drains:215]    Constitutional: Awake, alert, cooperative, no apparent distress. Morbidly obese.  Respiratory: Basilar crackles noted  Cardiovascular: Regular rate and rhythm, normal S1 and S2, and no murmur noted  GI: G-tube noted suprapubic catheter noted, she has healing incision, has multiple THAI drains placed by IR, bowel sounds are reduced.  Skin/Integumen: No rashes, no cyanosis, 1+ edema  Neuro : moving all 4 extremities, no focal deficit noted     Medications     dextrose       HYDROmorphone         amLODIPine  5 mg Oral or Feeding Tube Daily     DULoxetine  120 mg Oral QAM     furosemide  40 mg Intravenous BID     gabapentin  1,600 mg Oral or Feeding Tube TID     insulin aspart  1-6 Units Subcutaneous Q4H     insulin glargine  27 Units Subcutaneous QAM AC     ipratropium - albuterol 0.5 mg/2.5 mg/3 mL  3 mL Nebulization 4x daily     levothyroxine  88 mcg Oral or Feeding Tube QAM AC     methadone  20 mg Oral or Feeding Tube BID    Or     methadone  20 mg Oral BID     micafungin  100 mg Intravenous Q24H     pantoprazole  40 mg Oral BID AC    Or     pantoprazole  40 mg Oral or Feeding Tube BID AC     piperacillin-tazobactam  3.375 g Intravenous Q6H     polyethylene glycol  17 g Oral Daily     sennosides  1 tablet Oral BID     simvastatin  20 mg Oral or Feeding Tube At Bedtime     sodium chloride (PF)  10 mL Irrigation Q8H     sodium chloride (PF)  10 mL Irrigation Q8H     sodium chloride (PF)  3 mL Intracatheter Q8H     vancomycin (VANCOCIN) IV  2,000 mg Intravenous Q18H       Data   Recent Labs   Lab 01/30/20  0726 01/29/20  0836 01/28/20  0729 01/27/20  0826 01/27/20  0746 01/26/20  0821 01/25/20  2242    WBC 15.1* 13.1*  --   --  9.3 11.5* 13.0*   HGB 10.4* 9.8*  --   --  10.2* 9.8* 9.7*   MCV 89 88  --   --  89 90 89    329  --   --  352 342 336   INR  --   --   --  1.18*  --   --   --      --   --   --   --  138 136   POTASSIUM 3.7  --   --   --   --  4.0 3.7   CHLORIDE 101  --   --   --   --  97 94   CO2 36*  --   --   --   --  41* 41*   BUN 20  --   --   --   --  25 27   CR 0.54 0.55 0.56  --  0.65 0.65 0.62   ANIONGAP 3  --   --   --   --  <1* 1*   DORY 9.8  --   --   --   --  10.2* 10.4*   *  --   --   --   --  105* 144*   ALBUMIN  --   --   --   --   --  1.7* 1.8*   PROTTOTAL  --   --   --   --   --  5.8* 6.3*   BILITOTAL  --   --   --   --   --  0.3 0.3   ALKPHOS  --   --   --   --   --  136 148   ALT  --   --   --   --   --  20 22   AST  --   --   --   --   --  22 22   LIPASE  --   --   --   --   --   --  164     Recent Labs   Lab 01/30/20  1131 01/30/20  0757 01/30/20  0726 01/30/20  0405 01/30/20  0339 01/29/20  2355  01/26/20  0821  01/25/20  2241   GLC  --   --  143*  --   --   --   --  105*  --  144*   * 142*  --  125* 134* 149*   < >  --    < >  --     < > = values in this interval not displayed.       Imaging:   No results found for this or any previous visit (from the past 24 hour(s)).

## 2020-01-30 NOTE — PLAN OF CARE
A&Ox4. VSS on 2L oxgyen. Attempted to wean, pt desated into lows 80s. Scheduled nebs. Tele: SR with BBB. C/o moderate abdominal & back pain, PCA pump; somewhat effective. C/o constipation. Continuous TF through J tube running @60mL/hr; BG checks [id number]. G-tube open to gravity. SP catheter & malecot patent. 3 THAI drains; 2 with thick/milky output. Midline incision dressing CDI. Mepilex to coccyx. T/R q2hrs, refused repositioning this shift; education reinforced on importance. Refused PCDs. Clear liquid diet, tolerating. Likely back to TCU at discharge.

## 2020-01-30 NOTE — PROGRESS NOTES
CHANELLE  I: CHANELLE updated that patient's spouse has been at hospital all night, incontinent and unable to ambulate. CHANELLE contacted Jasvir from [facility] ([phone]) who was following patient's spouse since report was made on 1/17/20. SW left a detailed message requesting a c/b. Patient's spouse will be sent to ED for admission for safe discharge planning.     P: SW will continue to follow and assist as needed.    SARAH Hernandez, LGSW  [phone]  [facility]

## 2020-01-30 NOTE — PROGRESS NOTES
Bhavani seen briefly today. She was sitting up in bed eating a clear liquid diet tray and appeared more alert than I've seen her this hospitalization. Since she was eating and her drains appeared to be working well I didn't check them today.     S: Feeling good, better today. Eating some clear liquids. Someone lost my upper dentures (lowers are at home) this hospitalization.     O: Temp:  [96  F (35.6  C)-98.5  F (36.9  C)] 96.5  F (35.8  C)  Pulse:  [75-81] 75  Heart Rate:  [81] 81  Resp:  [16-22] 16  BP: (118-149)/(52-72) 125/52  SpO2:  [92 %-97 %] 96 %    Labs, notes, imaging, IOs and Vss reviewed    ROUTINE ICU LABS (Last four results)  CMP  Recent Labs   Lab 01/30/20 0726 01/29/20  0836 01/28/20  0729 01/27/20  0746 01/26/20  0821 01/25/20  2241     --   --   --  138 136   POTASSIUM 3.7  --   --   --  4.0 3.7   CHLORIDE 101  --   --   --  97 94   CO2 36*  --   --   --  41* 41*   ANIONGAP 3  --   --   --  <1* 1*   *  --   --   --  105* 144*   BUN 20  --   --   --  25 27   CR 0.54 0.55 0.56 0.65 0.65 0.62   GFRESTIMATED >90 >90 >90 >90 >90 >90   GFRESTBLACK >90 >90 >90 >90 >90 >90   DORY 9.8  --   --   --  10.2* 10.4*   MAG  --   --   --  1.9  --   --    PROTTOTAL  --   --   --   --  5.8* 6.3*   ALBUMIN  --   --   --   --  1.7* 1.8*   BILITOTAL  --   --   --   --  0.3 0.3   ALKPHOS  --   --   --   --  136 148   AST  --   --   --   --  22 22   ALT  --   --   --   --  20 22     CBC  Recent Labs   Lab 01/30/20 0726 01/29/20  0836 01/27/20  0746 01/26/20  0821   WBC 15.1* 13.1* 9.3 11.5*   RBC 3.97 3.70* 3.89 3.67*   HGB 10.4* 9.8* 10.2* 9.8*   HCT 35.2 32.6* 34.6* 33.1*   MCV 89 88 89 90   MCH 26.2* 26.5 26.2* 26.7   MCHC 29.5* 30.1* 29.5* 29.6*   RDW 15.3* 15.3* 14.9 15.1*    329 352 342     INR  Recent Labs   Lab 01/27/20  0826   INR 1.18*     Arterial Blood GasNo lab results found in last 7 days.    General  Outputs-  Left 1/27 400 mL; 1/28 135 mL; 1/29 50 so far today  Right 1/27 170  mL; 1/28  210 mL; 1/29 40 so far today    A/P: Wbc rising. Outputs from radiology drains decreasing as above. Surgical drain outputs minimal to none. D/w Dr Willingham possibility of chylous output from upper drains. Will watch for now. CT follow up soon per surgery.     Thanks [facility] Interventional Radiology CNP ([phone]) (phone [phone])

## 2020-01-30 NOTE — PROVIDER NOTIFICATION
MD Notification    Notified Person: MD    Notified Person Name: Dr. Lin    Notification Date/Time: 1/30/20 at 1030    Notification Interaction: face to face    Purpose of Notification:WBC at >15, trending up at this time. Md also notified that site around  THAI drain  ( RUQ) contained large amount of pus.     Orders Received: continue to monitor. No new changes needed at this time per MD order.    Comments:

## 2020-01-30 NOTE — PROGRESS NOTES
Surgery    Continues to have abdominal pain.  Mild improvement with PCA.  Difficult to monitor given the chronicity.  Drain outputs decreasing.  No fevers.  Passing gas without bowel movement.    Abdomen-drains in place.  Incision pink with minimal fibrinous exudate.  No significant tenderness throughout.    A/P  Drain output decreasing.  WBC up but afebrile.  Consider CT tomorrow for drain check and to assure no other intra-abdominal abnormalities given increasing white count.  Continue current care.    Ayan Lopez M.D.  [facility]  [phone]

## 2020-01-30 NOTE — PROGRESS NOTES
Patient given nebulizer treatments as ordered. BS are clear slightly diminished in the bases bilaterally. SpO2 is 96% on 2 lpm nc

## 2020-01-30 NOTE — PROGRESS NOTES
[facility]    Infectious Disease Progress Note    Date of Service (when I saw the patient): 01/30/2020     Assessment & Plan   Bhavani White is a 64 year old female who was admitted on 1/25/2020.       Impression:  1. 64 y.o who was recently admitted to the hospital was discharged on 21 January, that was a prolonged stay for perforated duodenal ulcer, s/p ex lap, TRUONG, Malecot drain placement with perforation, and wound VAC on 12/29/2019. Repeat ex-lap, TRUONG, closure of duodenal perforation over Malecot, G-tube/J-tube placements, delayed primary closure on 1/1/2020.  2. Admitted this occasion with abdominal pain, drainage from the G tube site.   3. CT scan with two large abscess cavities.   4. S/P multiple drain placement.   5. 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   6. She is on IV vancomycin and zosyn.   7. Diabetes.      Recommendations:   Continue zosyn for the: enterococcus, strep, h parainfluenza and also since intraabdominal abscesses for possible anaerobes which might be involved but not growing.   Continue on Vancomycin for the MRSA fromt he cultures.   Starting micafungin for the candida glabrata, can be switched to fluc after SEVERIANO back and if sensitive.       Chacho Solis MD    Interval History   Feels tiered   Sleepy   No fever     Physical Exam   Temp: 96.5  F (35.8  C) Temp src: Oral BP: 118/57 Pulse: 75 Heart Rate: 81 Resp: 16 SpO2: 95 % O2 Device: Nasal cannula Oxygen Delivery: 2 LPM  Vitals:    01/26/20 0502 01/27/20 0600 01/30/20 0651   Weight: 122.2 kg (269 lb 6.4 oz) 121.8 kg (268 lb 8.3 oz) 120.7 kg (266 lb)     Vital Signs with Ranges  Temp:  [96  F (35.6  C)-98.5  F (36.9  C)] 96.5  F (35.8  C)  Pulse:  [75-81] 75  Heart Rate:  [81] 81  Resp:  [16-22] 16  BP: (118-149)/(57-72) 118/57  SpO2:  [92 %-97 %] 95 %    Constitutional: Awake, alert, cooperative, no apparent distress  Lungs: Clear to auscultation bilaterally, no crackles or  wheezing  Cardiovascular: Regular rate and rhythm, normal S1 and S2, and no murmur noted  Abdomen: multiple drains   Skin: No rashes, no cyanosis, no edema  Other:    Medications     dextrose       HYDROmorphone         amLODIPine  5 mg Oral or Feeding Tube Daily     DULoxetine  120 mg Oral QAM     furosemide  40 mg Intravenous BID     gabapentin  1,600 mg Oral or Feeding Tube TID     insulin aspart  1-6 Units Subcutaneous Q4H     insulin glargine  27 Units Subcutaneous QAM AC     ipratropium - albuterol 0.5 mg/2.5 mg/3 mL  3 mL Nebulization 4x daily     levothyroxine  88 mcg Oral or Feeding Tube QAM AC     methadone  20 mg Oral or Feeding Tube BID    Or     methadone  20 mg Oral BID     micafungin  100 mg Intravenous Q24H     pantoprazole  40 mg Oral BID AC    Or     pantoprazole  40 mg Oral or Feeding Tube BID AC     piperacillin-tazobactam  3.375 g Intravenous Q6H     polyethylene glycol  17 g Oral Daily     sennosides  1 tablet Oral BID     simvastatin  20 mg Oral or Feeding Tube At Bedtime     sodium chloride (PF)  10 mL Irrigation Q8H     sodium chloride (PF)  10 mL Irrigation Q8H     sodium chloride (PF)  3 mL Intracatheter Q8H     vancomycin (VANCOCIN) IV  2,000 mg Intravenous Q18H       Data   All microbiology laboratory data reviewed.  Recent Labs   Lab Test 01/30/20  0726 01/29/20  0836 01/27/20  0746   WBC 15.1* 13.1* 9.3   HGB 10.4* 9.8* 10.2*   HCT 35.2 32.6* 34.6*   MCV 89 88 89    329 352     Recent Labs   Lab Test 01/30/20  0726 01/29/20  0836 01/28/20  0729   CR 0.54 0.55 0.56     No lab results found.  Recent Labs   Lab Test 01/27/20  1035 01/27/20  1000 01/25/20  2336 01/25/20  2241 01/04/20  1455 01/02/20  1233 12/30/19  0425 12/29/19  1507 12/29/19  1435   CULT Moderate growth  Streptococcus mitis group  Susceptibility testing not routinely done  *  Moderate growth  Haemophilus parainfluenzae  Susceptibility testing not routinely done  *  On day 1, isolated in broth only:  Candida  glabrata complex  Susceptibility testing not routinely done  *  Culture in progress  Culture in progress Moderate growth  Streptococcus mitis group  Susceptibility testing not routinely done  *  Moderate growth  Haemophilus parainfluenzae  Susceptibility testing not routinely done  *  Light growth  Enterococcus faecalis  *  On day 1, isolated in broth only:  Yeast  *  Culture in progress  Culture negative monitoring continues No growth after 4 days  50,000 to 100,000 colonies/mL  Pseudomonas aeruginosa  * No growth after 4 days  Heavy growth  Methicillin resistant Staphylococcus aureus (MRSA)  *  Heavy growth  Streptococcus mitis group  Susceptibility testing not routinely done  *  Heavy growth  Haemophilus parainfluenzae  Susceptibility testing not routinely done  * No growth Cultured on the 1st day of incubation:  Staphylococcus hominis  *  Critical Value/Significant Value, preliminary result only, called to and read back by  Sangeeta Taylor RN [facility] 1.3.20 @1137 AE    Susceptibility testing done on previous specimen Methicillin resistant Staphylococcus aureus (MRSA) isolated* No growth Cultured on the 1st day of incubation:  Staphylococcus hominis  *  Critical Value/Significant Value, preliminary result only, called to and read back by  Suhail Escobar RN from [facility] ICU. 12/30/19 at 1202.TV.    Cultured on the 1st day of incubation:  Staphylococcus epidermidis  *  (Note)  POSITIVE for Staphylococci other than S.aureus, S.epidermidis and  S.lugdunensis, by [employer] multiplex nucleic acid test.  Coagulase-negative staphylococci are the most common venipuncture or  collection associated skin CONTAMINANTS grown in blood cultures.  Final identification and antimicrobial susceptibility testing will be  verified by standard methods.    Specimen tested with [employer] multiplex, gram-positive blood culture  nucleic acid test for the following targets: Staph aureus, Staph  epidermidis, Staph  lugdunensis, other Staph species, Enterococcus  faecalis, Enterococcus faecium, Streptococcus species, S. agalactiae,  S. anginosus grp., S. pneumoniae, S. pyogenes, Listeria sp., mecA  (methicillin resistance) and Nick/B (vancomycin resistance).    Critical Value/Significant Value called to and read back by FANNY SAMANO RN 12/30/2019 @ 8379 BC         Attestation:  Total time on the floor involved in the patient's care: 35 minutes. Total time spent in counseling/care coordination: >50%

## 2020-01-31 ENCOUNTER — APPOINTMENT (OUTPATIENT)
Dept: CT IMAGING | Facility: CLINIC | Age: 65
DRG: 372 | End: 2020-01-31
Attending: SURGERY
Payer: MEDICARE

## 2020-01-31 LAB
ANION GAP SERPL CALCULATED.3IONS-SCNC: 1 MMOL/L (ref 3–14)
BUN SERPL-MCNC: 21 MG/DL (ref 7–30)
CALCIUM SERPL-MCNC: 9.5 MG/DL (ref 8.5–10.1)
CHLORIDE SERPL-SCNC: 98 MMOL/L (ref 94–109)
CO2 SERPL-SCNC: 39 MMOL/L (ref 20–32)
CREAT SERPL-MCNC: 0.51 MG/DL (ref 0.52–1.04)
ERYTHROCYTE [DISTWIDTH] IN BLOOD BY AUTOMATED COUNT: 15 % (ref 10–15)
GFR SERPL CREATININE-BSD FRML MDRD: >90 ML/MIN/{1.73_M2}
GLUCOSE BLDC GLUCOMTR-MCNC: 109 MG/DL (ref 70–99)
GLUCOSE BLDC GLUCOMTR-MCNC: 114 MG/DL (ref 70–99)
GLUCOSE BLDC GLUCOMTR-MCNC: 119 MG/DL (ref 70–99)
GLUCOSE BLDC GLUCOMTR-MCNC: 125 MG/DL (ref 70–99)
GLUCOSE BLDC GLUCOMTR-MCNC: 131 MG/DL (ref 70–99)
GLUCOSE SERPL-MCNC: 143 MG/DL (ref 70–99)
HCT VFR BLD AUTO: 32.4 % (ref 35–47)
HGB BLD-MCNC: 9.6 G/DL (ref 11.7–15.7)
MCH RBC QN AUTO: 25.9 PG (ref 26.5–33)
MCHC RBC AUTO-ENTMCNC: 29.6 G/DL (ref 31.5–36.5)
MCV RBC AUTO: 88 FL (ref 78–100)
PLATELET # BLD AUTO: 387 10E9/L (ref 150–450)
POTASSIUM SERPL-SCNC: 3.6 MMOL/L (ref 3.4–5.3)
RBC # BLD AUTO: 3.7 10E12/L (ref 3.8–5.2)
SODIUM SERPL-SCNC: 138 MMOL/L (ref 133–144)
VANCOMYCIN SERPL-MCNC: 23.9 MG/L
WBC # BLD AUTO: 17.7 10E9/L (ref 4–11)

## 2020-01-31 PROCEDURE — 00000146 ZZHCL STATISTIC GLUCOSE BY METER IP

## 2020-01-31 PROCEDURE — 25000125 ZZHC RX 250: Performed by: INTERNAL MEDICINE

## 2020-01-31 PROCEDURE — 25000128 H RX IP 250 OP 636: Performed by: HOSPITALIST

## 2020-01-31 PROCEDURE — 40000275 ZZH STATISTIC RCP TIME EA 10 MIN

## 2020-01-31 PROCEDURE — 94640 AIRWAY INHALATION TREATMENT: CPT | Mod: 76

## 2020-01-31 PROCEDURE — 80202 ASSAY OF VANCOMYCIN: CPT | Performed by: HOSPITALIST

## 2020-01-31 PROCEDURE — 25000128 H RX IP 250 OP 636: Performed by: INTERNAL MEDICINE

## 2020-01-31 PROCEDURE — 36415 COLL VENOUS BLD VENIPUNCTURE: CPT | Performed by: HOSPITALIST

## 2020-01-31 PROCEDURE — 99232 SBSQ HOSP IP/OBS MODERATE 35: CPT | Performed by: HOSPITALIST

## 2020-01-31 PROCEDURE — 25800030 ZZH RX IP 258 OP 636: Performed by: HOSPITALIST

## 2020-01-31 PROCEDURE — 25000132 ZZH RX MED GY IP 250 OP 250 PS 637: Mod: GY | Performed by: HOSPITALIST

## 2020-01-31 PROCEDURE — 25000125 ZZHC RX 250: Performed by: HOSPITALIST

## 2020-01-31 PROCEDURE — 40000901 ZZH STATISTIC WOC PT EDUCATION, 0-15 MIN

## 2020-01-31 PROCEDURE — 40000257 ZZH STATISTIC CONSULT NO CHARGE VASC ACCESS

## 2020-01-31 PROCEDURE — G0463 HOSPITAL OUTPT CLINIC VISIT: HCPCS

## 2020-01-31 PROCEDURE — 27210437 ZZH NUTRITION PRODUCT SEMIELEM INTERMED LITER

## 2020-01-31 PROCEDURE — 36569 INSJ PICC 5 YR+ W/O IMAGING: CPT

## 2020-01-31 PROCEDURE — 85027 COMPLETE CBC AUTOMATED: CPT | Performed by: HOSPITALIST

## 2020-01-31 PROCEDURE — 25800030 ZZH RX IP 258 OP 636

## 2020-01-31 PROCEDURE — 80048 BASIC METABOLIC PNL TOTAL CA: CPT | Performed by: HOSPITALIST

## 2020-01-31 PROCEDURE — 12000000 ZZH R&B MED SURG/OB

## 2020-01-31 PROCEDURE — 25000132 ZZH RX MED GY IP 250 OP 250 PS 637: Mod: GY | Performed by: INTERNAL MEDICINE

## 2020-01-31 PROCEDURE — 27211441 ZZ H KIT SHRLOCK 5FR POWER PICC TRIPLE LUMEN

## 2020-01-31 PROCEDURE — 94640 AIRWAY INHALATION TREATMENT: CPT

## 2020-01-31 PROCEDURE — 25800030 ZZH RX IP 258 OP 636: Performed by: INTERNAL MEDICINE

## 2020-01-31 PROCEDURE — 74177 CT ABD & PELVIS W/CONTRAST: CPT

## 2020-01-31 PROCEDURE — 25000131 ZZH RX MED GY IP 250 OP 636 PS 637: Mod: GY | Performed by: INTERNAL MEDICINE

## 2020-01-31 PROCEDURE — 25000128 H RX IP 250 OP 636

## 2020-01-31 PROCEDURE — 25000132 ZZH RX MED GY IP 250 OP 250 PS 637: Mod: GY

## 2020-01-31 RX ORDER — IOPAMIDOL 755 MG/ML
135 INJECTION, SOLUTION INTRAVASCULAR ONCE
Status: COMPLETED | OUTPATIENT
Start: 2020-01-31 | End: 2020-01-31

## 2020-01-31 RX ORDER — MAGNESIUM CARB/ALUMINUM HYDROX 105-160MG
296 TABLET,CHEWABLE ORAL DAILY PRN
Status: DISCONTINUED | OUTPATIENT
Start: 2020-01-31 | End: 2020-02-12 | Stop reason: HOSPADM

## 2020-01-31 RX ADMIN — DULOXETINE HYDROCHLORIDE 120 MG: 60 CAPSULE, DELAYED RELEASE ORAL at 08:32

## 2020-01-31 RX ADMIN — LEVOTHYROXINE SODIUM 88 MCG: 88 TABLET ORAL at 06:39

## 2020-01-31 RX ADMIN — SENNOSIDES 2 TABLET: 8.6 TABLET, FILM COATED ORAL at 20:24

## 2020-01-31 RX ADMIN — GABAPENTIN 1600 MG: 800 TABLET, FILM COATED ORAL at 22:17

## 2020-01-31 RX ADMIN — PIPERACILLIN AND TAZOBACTAM 3.38 G: 3; .375 INJECTION, POWDER, LYOPHILIZED, FOR SOLUTION INTRAVENOUS at 04:07

## 2020-01-31 RX ADMIN — LIDOCAINE HYDROCHLORIDE 1 ML: 10 INJECTION, SOLUTION INFILTRATION; PERINEURAL at 16:45

## 2020-01-31 RX ADMIN — IPRATROPIUM BROMIDE AND ALBUTEROL SULFATE 3 ML: .5; 3 SOLUTION RESPIRATORY (INHALATION) at 07:29

## 2020-01-31 RX ADMIN — AMLODIPINE BESYLATE 5 MG: 5 TABLET ORAL at 08:32

## 2020-01-31 RX ADMIN — PANTOPRAZOLE SODIUM 40 MG: 40 TABLET, DELAYED RELEASE ORAL at 16:05

## 2020-01-31 RX ADMIN — PIPERACILLIN AND TAZOBACTAM 3.38 G: 3; .375 INJECTION, POWDER, LYOPHILIZED, FOR SOLUTION INTRAVENOUS at 10:16

## 2020-01-31 RX ADMIN — POLYETHYLENE GLYCOL 3350 17 G: 17 POWDER, FOR SOLUTION ORAL at 08:42

## 2020-01-31 RX ADMIN — VANCOMYCIN HYDROCHLORIDE 2000 MG: 1 INJECTION, POWDER, LYOPHILIZED, FOR SOLUTION INTRAVENOUS at 00:06

## 2020-01-31 RX ADMIN — IOPAMIDOL 135 ML: 755 INJECTION, SOLUTION INTRAVENOUS at 12:55

## 2020-01-31 RX ADMIN — SODIUM CHLORIDE 80 ML: 9 INJECTION, SOLUTION INTRAVENOUS at 12:55

## 2020-01-31 RX ADMIN — METHADONE HYDROCHLORIDE 20 MG: 10 TABLET ORAL at 20:24

## 2020-01-31 RX ADMIN — GABAPENTIN 1600 MG: 800 TABLET, FILM COATED ORAL at 16:05

## 2020-01-31 RX ADMIN — PANTOPRAZOLE SODIUM 40 MG: 40 TABLET, DELAYED RELEASE ORAL at 06:39

## 2020-01-31 RX ADMIN — PIPERACILLIN AND TAZOBACTAM 3.38 G: 3; .375 INJECTION, POWDER, LYOPHILIZED, FOR SOLUTION INTRAVENOUS at 18:04

## 2020-01-31 RX ADMIN — SIMVASTATIN 20 MG: 20 TABLET, FILM COATED ORAL at 22:17

## 2020-01-31 RX ADMIN — GABAPENTIN 1600 MG: 800 TABLET, FILM COATED ORAL at 08:32

## 2020-01-31 RX ADMIN — IPRATROPIUM BROMIDE AND ALBUTEROL SULFATE 3 ML: .5; 3 SOLUTION RESPIRATORY (INHALATION) at 19:16

## 2020-01-31 RX ADMIN — IPRATROPIUM BROMIDE AND ALBUTEROL SULFATE 3 ML: .5; 3 SOLUTION RESPIRATORY (INHALATION) at 14:45

## 2020-01-31 RX ADMIN — SENNOSIDES 2 TABLET: 8.6 TABLET, FILM COATED ORAL at 08:32

## 2020-01-31 RX ADMIN — ALBUTEROL SULFATE 2.5 MG: 2.5 SOLUTION RESPIRATORY (INHALATION) at 04:04

## 2020-01-31 RX ADMIN — FUROSEMIDE 40 MG: 40 TABLET ORAL at 08:32

## 2020-01-31 RX ADMIN — INSULIN GLARGINE 27 UNITS: 100 INJECTION, SOLUTION SUBCUTANEOUS at 08:40

## 2020-01-31 RX ADMIN — METHADONE HYDROCHLORIDE 20 MG: 10 TABLET ORAL at 08:32

## 2020-01-31 RX ADMIN — MICAFUNGIN SODIUM 100 MG: 10 INJECTION, POWDER, LYOPHILIZED, FOR SOLUTION INTRAVENOUS at 15:34

## 2020-01-31 RX ADMIN — VANCOMYCIN HYDROCHLORIDE 2000 MG: 10 INJECTION, POWDER, LYOPHILIZED, FOR SOLUTION INTRAVENOUS at 12:23

## 2020-01-31 ASSESSMENT — ACTIVITIES OF DAILY LIVING (ADL)
ADLS_ACUITY_SCORE: 28

## 2020-01-31 ASSESSMENT — MIFFLIN-ST. JEOR: SCORE: 1903.06

## 2020-01-31 NOTE — PLAN OF CARE
A&Ox4. VSS on 2L oxygen (baseline RA). Tele: SR +BBB. Reports pain 5-8/10 to abdomen; PCA dilaudid & scheduled methadone, moderately effective. BS+, gas+. C/o increasing constipation (no BM in 1 week per pt); suppository given, no results yet. LS diminished; scheduled & PRN nebs. Multiple abdominal drains. RUQ THAI dressing reinforced. THAI #1 & #2 with thick/milky output. Suprapubic cath & malecot, patent. Midline abdominal incision dressing changed this shift; CDI. WOC following. TF @60mL/hr; BG checks q4hrs. Clear liquids for comfort. T/R q2hrs, pt refusing this shift; education reinforced on importance of repositioning. ID & surgery following. PIV with int abx. Continue to monitor.

## 2020-01-31 NOTE — PROGRESS NOTES
Surgery    Still with same pain. WBC up. No fevers. Malecot without output last few days. Still with more output from IR drains.    Abdomen-drains in place.  Incision pink withfibrinous exudate stable.  No significant tenderness throughout.    A/P  WBC up again today. Malecot drain without output. IR drains with continued output. Will get CT with contrast through g-tube today to assess abscess and perforation site. Will follow.    Ayan Lopez M.D.  [facility]  [phone]

## 2020-01-31 NOTE — PROGRESS NOTES
[facility]    Infectious Disease Progress Note    Date of Service (when I saw the patient): 01/31/2020     Assessment & Plan   Bhavani White is a 64 year old female who was admitted on 1/25/2020.       Impression:  1. 64 y.o who was recently admitted to the hospital was discharged on 21 January, that was a prolonged stay for perforated duodenal ulcer, s/p ex lap, TRUONG, Malecot drain placement with perforation, and wound VAC on 12/29/2019. Repeat ex-lap, TRUONG, closure of duodenal perforation over Malecot, G-tube/J-tube placements, delayed primary closure on 1/1/2020.  2. Admitted this occasion with abdominal pain, drainage from the G tube site.   3. CT scan with two large abscess cavities.   4. S/P multiple drain placement.   5. 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   6. She is on IV vancomycin and zosyn.   7. Diabetes.      Recommendations:   Continue zosyn for the: enterococcus, strep, h parainfluenza and also since intraabdominal abscesses for possible anaerobes which might be involved but not growing.   Continue on Vancomycin for the MRSA fromt he cultures.   Starting micafungin for the candida glabrata, can be switched to fluc after SEVERIANO back and if sensitive. Requested susceptibilities from the lab.  Noted plans for CT today will follow.   Will order a picc line.       Chacho Solis MD    Interval History   Feels tiered   Sleepy   No fever     Physical Exam   Temp: 98.5  F (36.9  C) Temp src: Oral BP: (!) 141/75 Pulse: 75 Heart Rate: 82 Resp: 16 SpO2: 95 % O2 Device: Nasal cannula Oxygen Delivery: 2 LPM  Vitals:    01/27/20 0600 01/30/20 0651 01/31/20 0610   Weight: 121.8 kg (268 lb 8.3 oz) 120.7 kg (266 lb) 134.7 kg (297 lb)     Vital Signs with Ranges  Temp:  [98  F (36.7  C)-98.5  F (36.9  C)] 98.5  F (36.9  C)  Pulse:  [75] 75  Heart Rate:  [70-82] 82  Resp:  [16] 16  BP: (121-141)/(52-75) 141/75  SpO2:  [95 %-99 %] 95 %    Constitutional:  Awake, alert, cooperative, no apparent distress  Lungs: Clear to auscultation bilaterally, no crackles or wheezing  Cardiovascular: Regular rate and rhythm, normal S1 and S2, and no murmur noted  Abdomen: multiple drains   Skin: No rashes, no cyanosis, no edema  Other:    Medications     dextrose       HYDROmorphone         amLODIPine  5 mg Oral or Feeding Tube Daily     DULoxetine  120 mg Oral QAM     furosemide  40 mg Oral Daily     gabapentin  1,600 mg Oral or Feeding Tube TID     insulin aspart  1-6 Units Subcutaneous Q4H     insulin glargine  27 Units Subcutaneous QAM AC     iohexol  50 mL Oral Once     ipratropium - albuterol 0.5 mg/2.5 mg/3 mL  3 mL Nebulization 4x daily     levothyroxine  88 mcg Oral or Feeding Tube QAM AC     methadone  20 mg Oral or Feeding Tube BID    Or     methadone  20 mg Oral BID     micafungin  100 mg Intravenous Q24H     pantoprazole  40 mg Oral BID AC    Or     pantoprazole  40 mg Oral or Feeding Tube BID AC     piperacillin-tazobactam  3.375 g Intravenous Q6H     polyethylene glycol  17 g Oral Daily     sennosides  2 tablet Oral BID     simvastatin  20 mg Oral or Feeding Tube At Bedtime     sodium chloride (PF)  10 mL Irrigation Q8H     sodium chloride (PF)  3 mL Intracatheter Q8H     vancomycin (VANCOCIN) IV  2,000 mg Intravenous Q24H       Data   All microbiology laboratory data reviewed.  Recent Labs   Lab Test 01/31/20  0812 01/30/20  0726 01/29/20  0836   WBC 17.7* 15.1* 13.1*   HGB 9.6* 10.4* 9.8*   HCT 32.4* 35.2 32.6*   MCV 88 89 88    361 329     Recent Labs   Lab Test 01/31/20  0812 01/30/20  0726 01/29/20  0836   CR 0.51* 0.54 0.55     No lab results found.  Recent Labs   Lab Test 01/27/20  1035 01/27/20  1000 01/25/20  2336 01/25/20  2241 01/04/20  1455 01/02/20  1233 12/30/19  0425 12/29/19  1507 12/29/19  1435   CULT Moderate growth  Streptococcus mitis group  Susceptibility testing not routinely done  *  Moderate growth  Haemophilus  parainfluenzae  Susceptibility testing not routinely done  *  On day 1, isolated in broth only:  Candida glabrata complex  Susceptibility testing not routinely done  *  Culture in progress Moderate growth  Streptococcus mitis group  Susceptibility testing not routinely done  *  Moderate growth  Haemophilus parainfluenzae  Susceptibility testing not routinely done  *  Light growth  Enterococcus faecalis  *  On day 1, isolated in broth only:  Candida glabrata complex  Susceptibility testing not routinely done  *  Culture negative monitoring continues No growth after 5 days  50,000 to 100,000 colonies/mL  Pseudomonas aeruginosa  * No growth after 5 days  Heavy growth  Methicillin resistant Staphylococcus aureus (MRSA)  *  Heavy growth  Streptococcus mitis group  Susceptibility testing not routinely done  *  Heavy growth  Haemophilus parainfluenzae  Susceptibility testing not routinely done  * No growth Cultured on the 1st day of incubation:  Staphylococcus hominis  *  Critical Value/Significant Value, preliminary result only, called to and read back by  Sangeeta Taylor RN [facility] 1.3.20 @113 AE    Susceptibility testing done on previous specimen Methicillin resistant Staphylococcus aureus (MRSA) isolated* No growth Cultured on the 1st day of incubation:  Staphylococcus hominis  *  Critical Value/Significant Value, preliminary result only, called to and read back by  Suhail Escobar RN from [facility] ICU. 12/30/19 at 1202.TV.    Cultured on the 1st day of incubation:  Staphylococcus epidermidis  *  (Note)  POSITIVE for Staphylococci other than S.aureus, S.epidermidis and  S.lugdunensis, by [employer] [facility] [employer] nucleic acid test.  Coagulase-negative staphylococci are the most common venipuncture or  collection associated skin CONTAMINANTS grown in blood cultures.  Final identification and antimicrobial susceptibility testing will be  verified by standard methods.    Specimen tested with [employer] [facility] [employer],  gram-positive blood culture  nucleic acid test for the following targets: Staph aureus, Staph  epidermidis, Staph lugdunensis, other Staph species, Enterococcus  faecalis, Enterococcus faecium, Streptococcus species, S. agalactiae,  S. anginosus grp., S. pneumoniae, S. pyogenes, Listeria sp., mecA  (methicillin resistance) and Nick/B (vancomycin resistance).    Critical Value/Significant Value called to and read back by FANNY SAMANO RN 12/30/2019 @ 7579 BC         Attestation:  Total time on the floor involved in the patient's care: 35 minutes. Total time spent in counseling/care coordination: >50%

## 2020-01-31 NOTE — PHARMACY-VANCOMYCIN DOSING SERVICE
Pharmacy Vancomycin Initial Note  Date of Service 2020  Patient's  1955  64 year old, female    Indication: Intra-abdominal infection    Current estimated CrCl = Estimated Creatinine Clearance: 138.9 mL/min (based on SCr of 0.54 mg/dL).    Creatinine for last 3 days  2020:  7:29 AM Creatinine 0.56 mg/dL  2020:  8:36 AM Creatinine 0.55 mg/dL  2020:  7:26 AM Creatinine 0.54 mg/dL    Recent Vancomycin Level(s) for last 3 days  2020:  4:33 PM Vancomycin Level 17.6 mg/L  2020: 12:06 AM Vancomycin Level 23.9 mg/L      Vancomycin IV Administrations (past 72 hours)                   vancomycin (VANCOCIN) 2,000 mg in sodium chloride 0.9 % 500 mL intermittent infusion (mg) 2,000 mg New Bag 20 0006     2,000 mg New Bag 20 0627     2,000 mg New Bag 20 1316     2,000 mg New Bag 20 1924                Nephrotoxins and other renal medications (From now, onward)    Start     Dose/Rate Route Frequency Ordered Stop    20 1200  vancomycin (VANCOCIN) 2,000 mg in sodium chloride 0.9 % 500 mL intermittent infusion      2,000 mg  over 2 Hours Intravenous EVERY 24 HOURS 20 0159      20 0900  furosemide (LASIX) tablet 40 mg      40 mg Oral DAILY 20 1413      20 1000  piperacillin-tazobactam (ZOSYN) 3.375 g vial to attach to  mL bag     Note to Pharmacy:  Pharmacy can adjust dose based on renal function.    3.375 g  over 1 Hours Intravenous EVERY 6 HOURS 20 0957            Contrast Orders - past 72 hours (72h ago, onward)    None                Plan:  1.  Start vancomycin  2000 mg IV q24h.   2.  Goal Trough Level: 15-20 mg/L   3.  Pharmacy will check trough levels as appropriate in 1-3 Days.    4. Serum creatinine levels will be ordered daily for the first week of therapy and at least twice weekly for subsequent weeks.    5. Gleason method utilized to dose vancomycin therapy: Method 1    Lincoln Bailey [facility]

## 2020-01-31 NOTE — PLAN OF CARE
Pt is A and O X4. VSS on 2-5 L O2,  ( 2 L per baseline). Reports 5/8/10 abdominal pain. Methadone not effective. Pca pump in use: moderately effective. Tele:SR  BBB+ PVC. Lungs dim. COOK. Needed more O2 today at times, and more drowsy today. Bowels active, passing flautus. No BM in a few days per pt report. Multiple bowel meds given today monitoring effectiveness.   Wound cares per POC. All drain dresssing CDI. THAI #1 and #2 putting out thick/tsn, milky output. Mepilex to coccyx CDI.  Malecot drain with brown drainage. On TF at 60 ml/hr. Clear liquid diet. BG checks q 4 hrs, insulin per sliding scale. Turned and repo as pt allowed, educated on importance of repositioning but continues to refuse as charted.   On bariatric pulsate mattress. ID, and surgery following. Plan for CT scan tomorrow per Mds notes.   Continue to monitor.

## 2020-01-31 NOTE — PLAN OF CARE
Patient is A/O x4. Ax2 with lift, Ax4 to turn for cares. VSS, 2L O2. IVF at 10 with Dilaudid PCA. IV abx. C/O bilater upper abdominal pain.3 THAI drains. RUQ malecot drain. Suprapubic catheter. Abdominal dressing changed by [facility] nurse today, along with mepilex to bottom. TF @60ml. Clear liquid diet. Tolerates ice chips well. Abd CT done today. Patient had very large hard BM this afternoon. Tele SR with PVC's. BG checks Q4H. Discharge plans pending further clinical improvement.

## 2020-01-31 NOTE — PROGRESS NOTES
[facility]    Hospitalist Progress Note    Assessment & Plan   Bhavani White is a 64 year old female who was admitted on 1/25/2020.  Patient had a long stay at [facility] and was discharged on 21 January, reported back on 26th May due to increased abdominal abscess.    Complex anterior upper abdominal fluid collections, recent discharge on January 21, 2020 status post perforated duodenal ulcer status post exploratory laparotomy primary closure delayed on January 1, 2020: Patient with recent discharge on January 21 of 2020 for a perforated duodenal ulcer and associated hemorrhagic shock and bowel ischemia status post exploratory laparotomy with drain placement and wound VAC.  Per report, patient with GJ tube and yellow discharge leaking around the site this afternoon prior to admission.  However, staff at facility noted more discharge and decided to call EMS, patient sent to the emergency department for further evaluation and treatment.  CT on admission shows complex anterior upper abdominal fluid collections more loculated than previous, likely developing abscesses with increased volume, communication with left upper quadrant collection to the multiloculated anterior epigastric collection, increased ascites, pleural effusions, suprapubic bladder catheter and percutaneous jejunostomy, see report for further details.  - Appreciate surgery input, consulted interventional radiology, she underwent additional drain placements 1/27  - Continue IV Vancomycin and Zosyn. Micafungin added 1/30. ID following.  - CT A/P today showed decrease in fluid collections  - restarted tube feeding and her PTA meds.  - note GJ closure piece broke this afternoon. Functional for now but will place IR consult to take a look when possible  - dilaudid PCA for better pain control. Discussed with pharm - okay to continue baseline methadone. Pain may have been related to severe constipation as well.    Acute on chronic  constipation: pt says she normally only has a BM about once a week. It had been over a week as of today.  - Pt had 3 large hard BMs today. Feeling much more comfortable.    Cardiac, hyperlipidemia, hypertension: Patient maintained on statin prior to admission.  Previous echocardiogram with EF at 60 to 65%, right ventricular global function probably normal, per report.  -Continue simvastatin and amlodipine  - pt is up 16 lbs since admission. Diuresing but very carefully given Vanc/Zosyn and risk of kidney injury.    Pseudomonas in urine: defer to ID whether this is colonizer vs pathogenic. 50-100k organisms.     Diabetes mellitus, insulin-dependent: Patient maintained on Lantus prior to admission. Good glycemic control here.  -Continue prior to admission Lantus 27 units subcutaneous every morning  -Insulin sliding scale, medium.     Hypothyroidism: Stable.  -Continue prior to admission levothyroxine      Chronic pain, on chronic methadone, anxiety, depression: Patient maintained on methadone prior to admission.  -PTA medications were restarted including gabapentin, methadone and Cymbalta.     GERD: Stable.  -Continue PPI     Insomnia: Stable.  -Continue prior to admission trazodone    DVT Prophylaxis: sequential compression device   Code Status: Full Code     Disposition: anticipate prolonged hospitalization    Morelia Lin MD    Interval History   Patient finally had BM mid day today and then 2 more. Very large and hard per nursing report. Pt more comfortable afterward. CT done. PICC line placed this afternoon given need for ongoing antibiotics and difficult IV access. Pt's  was admitted overnight and they were in the same room today but he is reportedly being discharged.      -Data reviewed today: I reviewed all new labs and imaging results over the last 24 hours.     Physical Exam   Temp: 98.3  F (36.8  C) Temp src: Oral BP: 129/72   Heart Rate: 83 Resp: 16 SpO2: 95 % O2 Device: Nasal cannula Oxygen  Delivery: 2 LPM  Vitals:    01/27/20 0600 01/30/20 0651 01/31/20 0610   Weight: 121.8 kg (268 lb 8.3 oz) 120.7 kg (266 lb) 134.7 kg (297 lb)     Vital Signs with Ranges  Temp:  [98  F (36.7  C)-98.5  F (36.9  C)] 98.3  F (36.8  C)  Heart Rate:  [70-83] 83  Resp:  [16] 16  BP: (121-141)/(66-75) 129/72  SpO2:  [95 %-99 %] 95 %  I/O last 3 completed shifts:  In: 300 [P.O.:300]  Out: 7315 [Urine:6550; Emesis/NG output:575; Drains:190]    Constitutional: Awake, alert, cooperative, no apparent distress. Morbidly obese.  Respiratory: Basilar crackles noted  Cardiovascular: Regular rate and rhythm, normal S1 and S2, and no murmur noted  GI: G-tube noted suprapubic catheter noted, she has healing incision, has multiple THAI drains placed by IR, bowel sounds are reduced.  Skin/Integumen: No rashes, no cyanosis, 1+ edema  Neuro : moving all 4 extremities, no focal deficit noted     Medications     dextrose       HYDROmorphone         amLODIPine  5 mg Oral or Feeding Tube Daily     DULoxetine  120 mg Oral QAM     furosemide  40 mg Oral Daily     gabapentin  1,600 mg Oral or Feeding Tube TID     insulin aspart  1-6 Units Subcutaneous Q4H     insulin glargine  27 Units Subcutaneous QAM AC     ipratropium - albuterol 0.5 mg/2.5 mg/3 mL  3 mL Nebulization 4x daily     levothyroxine  88 mcg Oral or Feeding Tube QAM AC     methadone  20 mg Oral or Feeding Tube BID    Or     methadone  20 mg Oral BID     micafungin  100 mg Intravenous Q24H     pantoprazole  40 mg Oral BID AC    Or     pantoprazole  40 mg Oral or Feeding Tube BID AC     piperacillin-tazobactam  3.375 g Intravenous Q6H     polyethylene glycol  17 g Oral Daily     sennosides  2 tablet Oral BID     simvastatin  20 mg Oral or Feeding Tube At Bedtime     sodium chloride (PF)  10 mL Irrigation Q8H     sodium chloride (PF)  3 mL Intracatheter Q8H     vancomycin (VANCOCIN) IV  2,000 mg Intravenous Q24H       Data   Recent Labs   Lab 01/31/20  0812 01/30/20  0726 01/29/20  0836   01/27/20  0826 01/26/20  0821 01/25/20  2241   WBC 17.7* 15.1* 13.1*  --   --    < > 11.5* 13.0*   HGB 9.6* 10.4* 9.8*  --   --    < > 9.8* 9.7*   MCV 88 89 88  --   --    < > 90 89    361 329  --   --    < > 342 336   INR  --   --   --   --  1.18*  --   --   --     140  --   --   --   --  138 136   POTASSIUM 3.6 3.7  --   --   --   --  4.0 3.7   CHLORIDE 98 101  --   --   --   --  97 94   CO2 39* 36*  --   --   --   --  41* 41*   BUN 21 20  --   --   --   --  25 27   CR 0.51* 0.54 0.55   < >  --    < > 0.65 0.62   ANIONGAP 1* 3  --   --   --   --  <1* 1*   DORY 9.5 9.8  --   --   --   --  10.2* 10.4*   * 143*  --   --   --   --  105* 144*   ALBUMIN  --   --   --   --   --   --  1.7* 1.8*   PROTTOTAL  --   --   --   --   --   --  5.8* 6.3*   BILITOTAL  --   --   --   --   --   --  0.3 0.3   ALKPHOS  --   --   --   --   --   --  136 148   ALT  --   --   --   --   --   --  20 22   AST  --   --   --   --   --   --  22 22   LIPASE  --   --   --   --   --   --   --  164    < > = values in this interval not displayed.     Recent Labs   Lab 01/31/20  1126 01/31/20  0812 01/31/20  0811 01/31/20  0358 01/30/20  2356 01/30/20  1945  01/30/20  0726  01/26/20  0821 01/25/20  2241   GLC  --  143*  --   --   --   --   --  143*  --  105*  --  144*   *  --  131* 125* 109* 96   < >  --    < >  --    < >  --     < > = values in this interval not displayed.       Imaging:   No results found for this or any previous visit (from the past 24 hour(s)).

## 2020-02-01 PROBLEM — Z86.14 HISTORY OF MRSA INFECTION: Chronic | Status: ACTIVE | Noted: 2020-02-01

## 2020-02-01 PROBLEM — B37.9 INFECTION DUE TO CANDIDA GLABRATA: Status: ACTIVE | Noted: 2020-02-01

## 2020-02-01 PROBLEM — K65.1 ABDOMINOPELVIC ABSCESS (H): Status: ACTIVE | Noted: 2020-02-01

## 2020-02-01 PROBLEM — A49.1 INFECTION DUE TO ALPHA-HEMOLYTIC STREPTOCOCCUS: Status: ACTIVE | Noted: 2020-02-01

## 2020-02-01 PROBLEM — D72.829 LEUKOCYTOSIS: Status: ACTIVE | Noted: 2020-02-01

## 2020-02-01 LAB
BACTERIA SPEC CULT: NO GROWTH
BACTERIA SPEC CULT: NO GROWTH
CREAT SERPL-MCNC: 0.52 MG/DL (ref 0.52–1.04)
GFR SERPL CREATININE-BSD FRML MDRD: >90 ML/MIN/{1.73_M2}
GLUCOSE BLDC GLUCOMTR-MCNC: 121 MG/DL (ref 70–99)
GLUCOSE BLDC GLUCOMTR-MCNC: 125 MG/DL (ref 70–99)
GLUCOSE BLDC GLUCOMTR-MCNC: 128 MG/DL (ref 70–99)
GLUCOSE BLDC GLUCOMTR-MCNC: 133 MG/DL (ref 70–99)
GLUCOSE BLDC GLUCOMTR-MCNC: 140 MG/DL (ref 70–99)
GLUCOSE BLDC GLUCOMTR-MCNC: 141 MG/DL (ref 70–99)
GLUCOSE BLDC GLUCOMTR-MCNC: 143 MG/DL (ref 70–99)
GLUCOSE BLDC GLUCOMTR-MCNC: 143 MG/DL (ref 70–99)
GLUCOSE BLDC GLUCOMTR-MCNC: 146 MG/DL (ref 70–99)
GLUCOSE BLDC GLUCOMTR-MCNC: 163 MG/DL (ref 70–99)
Lab: NORMAL
Lab: NORMAL
SPECIMEN SOURCE: NORMAL
SPECIMEN SOURCE: NORMAL

## 2020-02-01 PROCEDURE — 25000132 ZZH RX MED GY IP 250 OP 250 PS 637: Mod: GY

## 2020-02-01 PROCEDURE — 94640 AIRWAY INHALATION TREATMENT: CPT | Mod: 76

## 2020-02-01 PROCEDURE — 99233 SBSQ HOSP IP/OBS HIGH 50: CPT | Performed by: INTERNAL MEDICINE

## 2020-02-01 PROCEDURE — 25000132 ZZH RX MED GY IP 250 OP 250 PS 637: Mod: GY | Performed by: INTERNAL MEDICINE

## 2020-02-01 PROCEDURE — 25000131 ZZH RX MED GY IP 250 OP 636 PS 637: Mod: GY | Performed by: HOSPITALIST

## 2020-02-01 PROCEDURE — 25000128 H RX IP 250 OP 636: Performed by: HOSPITALIST

## 2020-02-01 PROCEDURE — 25000131 ZZH RX MED GY IP 250 OP 636 PS 637: Mod: GY | Performed by: INTERNAL MEDICINE

## 2020-02-01 PROCEDURE — 25800030 ZZH RX IP 258 OP 636: Performed by: SURGERY

## 2020-02-01 PROCEDURE — 27210437 ZZH NUTRITION PRODUCT SEMIELEM INTERMED LITER

## 2020-02-01 PROCEDURE — 00000146 ZZHCL STATISTIC GLUCOSE BY METER IP

## 2020-02-01 PROCEDURE — 25000128 H RX IP 250 OP 636: Performed by: INTERNAL MEDICINE

## 2020-02-01 PROCEDURE — 94640 AIRWAY INHALATION TREATMENT: CPT

## 2020-02-01 PROCEDURE — 40000239 ZZH STATISTIC VAT ROUNDS

## 2020-02-01 PROCEDURE — 40000275 ZZH STATISTIC RCP TIME EA 10 MIN

## 2020-02-01 PROCEDURE — 25000125 ZZHC RX 250: Performed by: HOSPITALIST

## 2020-02-01 PROCEDURE — 12000000 ZZH R&B MED SURG/OB

## 2020-02-01 PROCEDURE — 25800030 ZZH RX IP 258 OP 636: Performed by: INTERNAL MEDICINE

## 2020-02-01 PROCEDURE — 82565 ASSAY OF CREATININE: CPT | Performed by: HOSPITALIST

## 2020-02-01 PROCEDURE — 25800030 ZZH RX IP 258 OP 636: Performed by: HOSPITALIST

## 2020-02-01 PROCEDURE — 25000132 ZZH RX MED GY IP 250 OP 250 PS 637: Mod: GY | Performed by: HOSPITALIST

## 2020-02-01 RX ORDER — SODIUM CHLORIDE 9 MG/ML
INJECTION, SOLUTION INTRAVENOUS CONTINUOUS
Status: DISCONTINUED | OUTPATIENT
Start: 2020-02-01 | End: 2020-02-12 | Stop reason: HOSPADM

## 2020-02-01 RX ADMIN — INSULIN GLARGINE 27 UNITS: 100 INJECTION, SOLUTION SUBCUTANEOUS at 09:21

## 2020-02-01 RX ADMIN — PIPERACILLIN AND TAZOBACTAM 3.38 G: 3; .375 INJECTION, POWDER, LYOPHILIZED, FOR SOLUTION INTRAVENOUS at 00:40

## 2020-02-01 RX ADMIN — PIPERACILLIN AND TAZOBACTAM 3.38 G: 3; .375 INJECTION, POWDER, LYOPHILIZED, FOR SOLUTION INTRAVENOUS at 17:40

## 2020-02-01 RX ADMIN — DULOXETINE HYDROCHLORIDE 120 MG: 60 CAPSULE, DELAYED RELEASE ORAL at 09:22

## 2020-02-01 RX ADMIN — FUROSEMIDE 40 MG: 40 TABLET ORAL at 09:23

## 2020-02-01 RX ADMIN — LEVOTHYROXINE SODIUM 88 MCG: 88 TABLET ORAL at 06:36

## 2020-02-01 RX ADMIN — METHADONE HYDROCHLORIDE 20 MG: 10 TABLET ORAL at 21:52

## 2020-02-01 RX ADMIN — METHADONE HYDROCHLORIDE 20 MG: 10 CONCENTRATE ORAL at 09:21

## 2020-02-01 RX ADMIN — SENNOSIDES 2 TABLET: 8.6 TABLET, FILM COATED ORAL at 09:20

## 2020-02-01 RX ADMIN — GABAPENTIN 1600 MG: 800 TABLET, FILM COATED ORAL at 15:37

## 2020-02-01 RX ADMIN — SIMVASTATIN 20 MG: 20 TABLET, FILM COATED ORAL at 21:52

## 2020-02-01 RX ADMIN — MICAFUNGIN SODIUM 100 MG: 10 INJECTION, POWDER, LYOPHILIZED, FOR SOLUTION INTRAVENOUS at 13:35

## 2020-02-01 RX ADMIN — IPRATROPIUM BROMIDE AND ALBUTEROL SULFATE 3 ML: .5; 3 SOLUTION RESPIRATORY (INHALATION) at 19:34

## 2020-02-01 RX ADMIN — PIPERACILLIN AND TAZOBACTAM 3.38 G: 3; .375 INJECTION, POWDER, LYOPHILIZED, FOR SOLUTION INTRAVENOUS at 12:51

## 2020-02-01 RX ADMIN — INSULIN ASPART 1 UNITS: 100 INJECTION, SOLUTION INTRAVENOUS; SUBCUTANEOUS at 03:45

## 2020-02-01 RX ADMIN — INSULIN ASPART 1 UNITS: 100 INJECTION, SOLUTION INTRAVENOUS; SUBCUTANEOUS at 09:22

## 2020-02-01 RX ADMIN — PANTOPRAZOLE SODIUM 40 MG: 40 TABLET, DELAYED RELEASE ORAL at 15:37

## 2020-02-01 RX ADMIN — GABAPENTIN 1600 MG: 800 TABLET, FILM COATED ORAL at 09:22

## 2020-02-01 RX ADMIN — Medication: at 03:50

## 2020-02-01 RX ADMIN — IPRATROPIUM BROMIDE AND ALBUTEROL SULFATE 3 ML: .5; 3 SOLUTION RESPIRATORY (INHALATION) at 08:26

## 2020-02-01 RX ADMIN — SODIUM CHLORIDE: 9 INJECTION, SOLUTION INTRAVENOUS at 13:43

## 2020-02-01 RX ADMIN — IPRATROPIUM BROMIDE AND ALBUTEROL SULFATE 3 ML: .5; 3 SOLUTION RESPIRATORY (INHALATION) at 12:15

## 2020-02-01 RX ADMIN — IPRATROPIUM BROMIDE AND ALBUTEROL SULFATE 3 ML: .5; 3 SOLUTION RESPIRATORY (INHALATION) at 16:03

## 2020-02-01 RX ADMIN — PANTOPRAZOLE SODIUM 40 MG: 40 TABLET, DELAYED RELEASE ORAL at 06:36

## 2020-02-01 RX ADMIN — TRAZODONE HYDROCHLORIDE 100 MG: 100 TABLET ORAL at 22:06

## 2020-02-01 RX ADMIN — PIPERACILLIN AND TAZOBACTAM 3.38 G: 3; .375 INJECTION, POWDER, LYOPHILIZED, FOR SOLUTION INTRAVENOUS at 06:25

## 2020-02-01 RX ADMIN — INSULIN ASPART 1 UNITS: 100 INJECTION, SOLUTION INTRAVENOUS; SUBCUTANEOUS at 20:23

## 2020-02-01 RX ADMIN — SENNOSIDES 2 TABLET: 8.6 TABLET, FILM COATED ORAL at 21:52

## 2020-02-01 RX ADMIN — POLYETHYLENE GLYCOL 3350 17 G: 17 POWDER, FOR SOLUTION ORAL at 09:21

## 2020-02-01 RX ADMIN — AMLODIPINE BESYLATE 5 MG: 5 TABLET ORAL at 09:23

## 2020-02-01 RX ADMIN — GABAPENTIN 1600 MG: 800 TABLET, FILM COATED ORAL at 21:52

## 2020-02-01 RX ADMIN — VANCOMYCIN HYDROCHLORIDE 2000 MG: 10 INJECTION, POWDER, LYOPHILIZED, FOR SOLUTION INTRAVENOUS at 13:18

## 2020-02-01 RX ADMIN — ALBUTEROL SULFATE 2.5 MG: 2.5 SOLUTION RESPIRATORY (INHALATION) at 00:37

## 2020-02-01 RX ADMIN — INSULIN ASPART 1 UNITS: 100 INJECTION, SOLUTION INTRAVENOUS; SUBCUTANEOUS at 13:16

## 2020-02-01 ASSESSMENT — ACTIVITIES OF DAILY LIVING (ADL)
ADLS_ACUITY_SCORE: 30
ADLS_ACUITY_SCORE: 28
ADLS_ACUITY_SCORE: 28
ADLS_ACUITY_SCORE: 30

## 2020-02-01 NOTE — PLAN OF CARE
Pt A/Ox4, VSS on 1L NC.  C/o abdominal pain, PCA dilaudid and scheduled methadone covering.  3 THAI drains with minimal thick tan output, malecot drain with thick yellow/dietrich output.  R PICC placed today, TKO NS and PCA running, intermittent abx. Tube feed running @60, tolerating clear liquids.  Mepilexes to buttocks CDI, changed by WOC today.  One small loose stool this shift.  Suprapubic catheter with good clear UOP. Discharge pending, nursing continue to monitor.

## 2020-02-01 NOTE — PROVIDER NOTIFICATION
MD Notification    Notified Person: MD    Notified Person Name: Juan Francisco    Notification Date/Time:  2/1/20 7070    Notification Interaction: telephone page    Purpose of Notification: Ok to clamp G tube? Surgical note says start full liquid, clamp G tube.    Orders Received: Ok to clamp G tube    Comments:

## 2020-02-01 NOTE — PLAN OF CARE
Shift 8971-1165: VSS. A/O x 4. Lungs: diminished throughout, 2 L O2 NC. BS present, no BM. Suprapubic catheter in place: WDL output, large. Diet: full liquid. Activity: Repo Q 2--refuses repositioning; assist 2. Pain managed w/ PCA pump. All drain(s) output marked and charted. Contact precautions maintained.    New:  NS TKO 10-30 ml/hr added  Diet advanced

## 2020-02-01 NOTE — PROVIDER NOTIFICATION
MD Notification    Notified Person: MD    Notified Person Name: Juan Francisco    Notification Date/Time: 2/1/20 2798    Notification Interaction: telephone page    Purpose of Notification: Pt c/o LUQ intense pain, states she is having difficulty breathing. Re-open clamped G tube to gravity?    Orders Received: Re-open G tube to gravity.    Comments:

## 2020-02-01 NOTE — PROGRESS NOTES
[facility]    Hospitalist Progress Note    Assessment & Plan   Bhavani White is a 64 year old female who was admitted on 1/25/2020.  Patient had a long stay at [facility] and was discharged on 21 January, reported back on 26th May due to increased abdominal abscess.    Complex anterior upper abdominal fluid collections, recent discharge on January 21, 2020 status post perforated duodenal ulcer status post exploratory laparotomy primary closure delayed on January 1, 2020: Patient with recent discharge on January 21 of 2020 for a perforated duodenal ulcer and associated hemorrhagic shock and bowel ischemia status post exploratory laparotomy with drain placement and wound VAC.  Per report, patient with GJ tube and yellow discharge leaking around the site this afternoon prior to admission.  However, staff at facility noted more discharge and decided to call EMS, patient sent to the emergency department for further evaluation and treatment.  CT on admission shows complex anterior upper abdominal fluid collections more loculated than previous, likely developing abscesses with increased volume, communication with left upper quadrant collection to the multiloculated anterior epigastric collection, increased ascites, pleural effusions, suprapubic bladder catheter and percutaneous jejunostomy, see report for further details.  - Appreciate surgery input, consulted interventional radiology, she underwent additional drain placements 1/27  - Continue IV Vancomycin and Zosyn. Micafungin added 1/30. ID following.  - CT A/P 1/31 showed decrease in fluid collections  -IR consult  pending following GJ closure tube base being broken on 1/31  - dilaudid PCA for better pain control.   -Patient had a new PICC line placed on 1/31    Acute on chronic constipation: pt says she normally only has a BM about once a week. It had been over a week as of today.  -Patient had multiple bowel movements yesterday, denies any  increased pain today    Cardiac, hyperlipidemia, hypertension: Patient maintained on statin prior to admission.  Previous echocardiogram with EF at 60 to 65%, right ventricular global function probably normal, per report.  -Continue simvastatin and amlodipine  - pt is up 16 lbs since admission. Diuresing but very carefully given Vanc/Zosyn and risk of kidney injury.  -Monitor BMP    Pseudomonas in urine: defer to ID whether this is colonizer vs pathogenic. 50-100k organisms.  --Patient is already on Zosyn     Diabetes mellitus, insulin-dependent: Patient maintained on Lantus prior to admission. Good glycemic control here.  -Continue prior to admission Lantus 27 units subcutaneous every morning  -Insulin sliding scale, medium.     Hypothyroidism: Stable.  -Continue prior to admission levothyroxine      Chronic pain, on chronic methadone, anxiety, depression: Patient maintained on methadone prior to admission.  -PTA medications were restarted including gabapentin, methadone and Cymbalta.     GERD: Stable.  -Continue PPI     Insomnia: Stable.  -Continue prior to admission trazodone    DVT Prophylaxis: sequential compression device   Code Status: Full Code     Disposition: anticipate prolonged hospitalization    Grisel Chicas MD    Interval History   Patient resting, asking when she can leave the hospital, we discussed about the CT scan results, she is happy about that, pain well controlled, she was mostly on the motorized chair prior to this, she is on tube feeds, on clear liquid diet, asking whether she could have milk.      -Data reviewed today: I reviewed all new labs and imaging results over the last 24 hours.     Physical Exam   Temp: 97.5  F (36.4  C) Temp src: Axillary BP: 130/69   Heart Rate: 74 Resp: 16 SpO2: 97 % O2 Device: Nasal cannula Oxygen Delivery: 2 LPM  Vitals:    01/27/20 0600 01/30/20 0651 01/31/20 0610   Weight: 121.8 kg (268 lb 8.3 oz) 120.7 kg (266 lb) 134.7 kg (297 lb)     Vital Signs with  Ranges  Temp:  [97.5  F (36.4  C)] 97.5  F (36.4  C)  Heart Rate:  [74] 74  Resp:  [16] 16  BP: (130)/(69) 130/69  SpO2:  [83 %-98 %] 97 %  I/O last 3 completed shifts:  In: 420 [P.O.:360; NG/GT:60]  Out: 5078 [Urine:4250; Emesis/NG output:650; Drains:178]    Constitutional: Awake, alert, cooperative, no apparent distress. Morbidly obese.  Respiratory: Basilar crackles noted  Cardiovascular: Regular rate and rhythm, normal S1 and S2, and no murmur noted  GI: G-tube noted suprapubic catheter noted, she has healing incision, has multiple THAI drains placed by IR, bowel sounds are reduced.  Skin/Integumen: No rashes, no cyanosis, 1+ edema  Neuro : moving all 4 extremities, no focal deficit noted     Medications     dextrose       HYDROmorphone         amLODIPine  5 mg Oral or Feeding Tube Daily     DULoxetine  120 mg Oral QAM     furosemide  40 mg Oral Daily     gabapentin  1,600 mg Oral or Feeding Tube TID     insulin aspart  1-6 Units Subcutaneous Q4H     insulin glargine  27 Units Subcutaneous QAM AC     ipratropium - albuterol 0.5 mg/2.5 mg/3 mL  3 mL Nebulization 4x daily     levothyroxine  88 mcg Oral or Feeding Tube QAM AC     methadone  20 mg Oral or Feeding Tube BID    Or     methadone  20 mg Oral BID     micafungin  100 mg Intravenous Q24H     pantoprazole  40 mg Oral BID AC    Or     pantoprazole  40 mg Oral or Feeding Tube BID AC     piperacillin-tazobactam  3.375 g Intravenous Q6H     polyethylene glycol  17 g Oral Daily     sennosides  2 tablet Oral BID     simvastatin  20 mg Oral or Feeding Tube At Bedtime     sodium chloride (PF)  10 mL Intracatheter Q8H     sodium chloride (PF)  10 mL Irrigation Q8H     sodium chloride (PF)  3 mL Intracatheter Q8H     vancomycin (VANCOCIN) IV  2,000 mg Intravenous Q24H       Data   Recent Labs   Lab 02/01/20  0615 01/31/20  0812 01/30/20  0726 01/29/20  0836  01/27/20  0826  01/26/20  0821 01/25/20  2241   WBC  --  17.7* 15.1* 13.1*  --   --    < > 11.5* 13.0*   HGB  --   9.6* 10.4* 9.8*  --   --    < > 9.8* 9.7*   MCV  --  88 89 88  --   --    < > 90 89   PLT  --  387 361 329  --   --    < > 342 336   INR  --   --   --   --   --  1.18*  --   --   --    NA  --  138 140  --   --   --   --  138 136   POTASSIUM  --  3.6 3.7  --   --   --   --  4.0 3.7   CHLORIDE  --  98 101  --   --   --   --  97 94   CO2  --  39* 36*  --   --   --   --  41* 41*   BUN  --  21 20  --   --   --   --  25 27   CR 0.52 0.51* 0.54 0.55   < >  --    < > 0.65 0.62   ANIONGAP  --  1* 3  --   --   --   --  <1* 1*   DORY  --  9.5 9.8  --   --   --   --  10.2* 10.4*   GLC  --  143* 143*  --   --   --   --  105* 144*   ALBUMIN  --   --   --   --   --   --   --  1.7* 1.8*   PROTTOTAL  --   --   --   --   --   --   --  5.8* 6.3*   BILITOTAL  --   --   --   --   --   --   --  0.3 0.3   ALKPHOS  --   --   --   --   --   --   --  136 148   ALT  --   --   --   --   --   --   --  20 22   AST  --   --   --   --   --   --   --  22 22   LIPASE  --   --   --   --   --   --   --   --  164    < > = values in this interval not displayed.     Recent Labs   Lab 02/01/20  0322 02/01/20  0024 01/31/20  1126 01/31/20  0812 01/31/20  0811 01/31/20  0358  01/30/20  0726  01/26/20  0821  01/25/20  2241   GLC  --   --   --  143*  --   --   --  143*  --  105*  --  144*   * 125* 119*  --  131* 125*   < >  --    < >  --    < >  --     < > = values in this interval not displayed.       Imaging:   Recent Results (from the past 24 hour(s))   CT Abdomen Pelvis w Contrast    Narrative    CT ABDOMEN AND PELVIS WITH CONTRAST 1/31/2020 12:58 PM       INDICATION: Increasing abdominal pain and white blood cell count.    TECHNIQUE: CT abdomen and pelvis. Coronal reconstruction. Dose  reduction techniques were used.    IV CONTRAST: 135 mL [id number]    COMPARISON: 1/25/2020    FINDINGS:   LUNG BASES: Moderate left and small right pleural effusions. The right  hemidiaphragm is elevated and there is consolidation/atelectasis in  both lung  bases.    ABDOMEN: New percutaneous drainage catheter in the perisplenic fluid  collection. This fluid collection is significantly smaller than  previous, measuring only 1.5 cm in thickness compared to previous 5.5.  A second drainage catheter in the region of the gastrohepatic  ligament, with near complete resolution of the loculated fluid  collection in that location. A surgical drain in the right upper  quadrant is unchanged.    Percutaneous gastrostomy tube in the stomach. There is an additional  surgically placed drain in the right upper quadrant with a mushroom  type end, adjacent to the duodenum. Minimal gas and inflammation  surround this tube without significant residual fluid.    Perihepatic ascites is unchanged. The pancreas is atrophic. Normal  liver, adrenal glands, and right kidney. Left renal cyst.    PELVIS: Suprapubic catheter. No additional fluid collections.    MUSCULOSKELETAL: Negative.    CONCLUSION: Significant reduction in size of abdominal fluid  collections, status post drainage catheter placement.    CLIVE TEIXEIRA MD

## 2020-02-01 NOTE — PROGRESS NOTES
Surgery    Feels better today.  Requesting full liquids.  No fevers or chills.  Drain output decreased.    Abdomen-drains in place.  Incision pink with fibrinous exudate stable.  No significant tenderness throughout.    A/P  CT reviewed.  Cavity is significantly decreased.  No residual fluid collections.  Drain output continues to decline.  Contrast also mated through her previous perforation site into the small bowel.  Can start a low-fat full liquid diet and clamp G-tube.  Follow drain outputs but likely can be removed in the next few days. Try to convert to oral medications and DC PCA.    Ayan Lopez M.D.  [facility]  [phone]

## 2020-02-01 NOTE — PLAN OF CARE
VSS. 2L O2. A&O x4. Pt requesting to sleep overnight. Refused repositioning despite education on pressure injury risk and several attempts. Up with lift. PCA for pain effective. Multiple drains to abdomen intact, flushed per MAR. Tube feeding running. Tolerating clears (had jello). [place] with adequate urine output. No BM overnight. PICC in right arm.

## 2020-02-02 LAB
ANION GAP SERPL CALCULATED.3IONS-SCNC: 1 MMOL/L (ref 3–14)
BUN SERPL-MCNC: 19 MG/DL (ref 7–30)
CALCIUM SERPL-MCNC: 9.5 MG/DL (ref 8.5–10.1)
CHLORIDE SERPL-SCNC: 96 MMOL/L (ref 94–109)
CO2 SERPL-SCNC: 41 MMOL/L (ref 20–32)
CREAT SERPL-MCNC: 0.49 MG/DL (ref 0.52–1.04)
ERYTHROCYTE [DISTWIDTH] IN BLOOD BY AUTOMATED COUNT: 15.2 % (ref 10–15)
GFR SERPL CREATININE-BSD FRML MDRD: >90 ML/MIN/{1.73_M2}
GLUCOSE BLDC GLUCOMTR-MCNC: 120 MG/DL (ref 70–99)
GLUCOSE BLDC GLUCOMTR-MCNC: 125 MG/DL (ref 70–99)
GLUCOSE BLDC GLUCOMTR-MCNC: 137 MG/DL (ref 70–99)
GLUCOSE BLDC GLUCOMTR-MCNC: 172 MG/DL (ref 70–99)
GLUCOSE SERPL-MCNC: 134 MG/DL (ref 70–99)
HCT VFR BLD AUTO: 33.5 % (ref 35–47)
HGB BLD-MCNC: 9.8 G/DL (ref 11.7–15.7)
MAGNESIUM SERPL-MCNC: 1.9 MG/DL (ref 1.6–2.3)
MCH RBC QN AUTO: 25.7 PG (ref 26.5–33)
MCHC RBC AUTO-ENTMCNC: 29.3 G/DL (ref 31.5–36.5)
MCV RBC AUTO: 88 FL (ref 78–100)
PLATELET # BLD AUTO: 429 10E9/L (ref 150–450)
POTASSIUM SERPL-SCNC: 3.7 MMOL/L (ref 3.4–5.3)
RBC # BLD AUTO: 3.81 10E12/L (ref 3.8–5.2)
SODIUM SERPL-SCNC: 138 MMOL/L (ref 133–144)
VANCOMYCIN SERPL-MCNC: 14.9 MG/L
WBC # BLD AUTO: 16 10E9/L (ref 4–11)

## 2020-02-02 PROCEDURE — 83735 ASSAY OF MAGNESIUM: CPT | Performed by: INTERNAL MEDICINE

## 2020-02-02 PROCEDURE — 40000275 ZZH STATISTIC RCP TIME EA 10 MIN

## 2020-02-02 PROCEDURE — 27210437 ZZH NUTRITION PRODUCT SEMIELEM INTERMED LITER

## 2020-02-02 PROCEDURE — 25000132 ZZH RX MED GY IP 250 OP 250 PS 637: Mod: GY | Performed by: HOSPITALIST

## 2020-02-02 PROCEDURE — 85027 COMPLETE CBC AUTOMATED: CPT | Performed by: INTERNAL MEDICINE

## 2020-02-02 PROCEDURE — 80048 BASIC METABOLIC PNL TOTAL CA: CPT | Performed by: INTERNAL MEDICINE

## 2020-02-02 PROCEDURE — 25000132 ZZH RX MED GY IP 250 OP 250 PS 637: Mod: GY

## 2020-02-02 PROCEDURE — 25800030 ZZH RX IP 258 OP 636: Performed by: HOSPITALIST

## 2020-02-02 PROCEDURE — 25000128 H RX IP 250 OP 636: Performed by: INTERNAL MEDICINE

## 2020-02-02 PROCEDURE — 25000128 H RX IP 250 OP 636: Performed by: HOSPITALIST

## 2020-02-02 PROCEDURE — 25000125 ZZHC RX 250: Performed by: HOSPITALIST

## 2020-02-02 PROCEDURE — 40000239 ZZH STATISTIC VAT ROUNDS

## 2020-02-02 PROCEDURE — 94640 AIRWAY INHALATION TREATMENT: CPT | Mod: 76

## 2020-02-02 PROCEDURE — 12000000 ZZH R&B MED SURG/OB

## 2020-02-02 PROCEDURE — 94640 AIRWAY INHALATION TREATMENT: CPT

## 2020-02-02 PROCEDURE — 25000131 ZZH RX MED GY IP 250 OP 636 PS 637: Mod: GY | Performed by: INTERNAL MEDICINE

## 2020-02-02 PROCEDURE — 80202 ASSAY OF VANCOMYCIN: CPT | Performed by: HOSPITALIST

## 2020-02-02 PROCEDURE — 00000146 ZZHCL STATISTIC GLUCOSE BY METER IP

## 2020-02-02 PROCEDURE — 99233 SBSQ HOSP IP/OBS HIGH 50: CPT | Performed by: INTERNAL MEDICINE

## 2020-02-02 PROCEDURE — 25800030 ZZH RX IP 258 OP 636: Performed by: INTERNAL MEDICINE

## 2020-02-02 PROCEDURE — 25000132 ZZH RX MED GY IP 250 OP 250 PS 637: Mod: GY | Performed by: INTERNAL MEDICINE

## 2020-02-02 PROCEDURE — 25800030 ZZH RX IP 258 OP 636: Performed by: SURGERY

## 2020-02-02 RX ORDER — TRAZODONE HYDROCHLORIDE 100 MG/1
100 TABLET ORAL AT BEDTIME
Status: DISCONTINUED | OUTPATIENT
Start: 2020-02-02 | End: 2020-02-12 | Stop reason: HOSPADM

## 2020-02-02 RX ADMIN — ALBUTEROL SULFATE 2.5 MG: 2.5 SOLUTION RESPIRATORY (INHALATION) at 05:49

## 2020-02-02 RX ADMIN — PANTOPRAZOLE SODIUM 40 MG: 40 TABLET, DELAYED RELEASE ORAL at 16:10

## 2020-02-02 RX ADMIN — SENNOSIDES 2 TABLET: 8.6 TABLET, FILM COATED ORAL at 21:54

## 2020-02-02 RX ADMIN — SENNOSIDES 2 TABLET: 8.6 TABLET, FILM COATED ORAL at 08:31

## 2020-02-02 RX ADMIN — METHADONE HYDROCHLORIDE 20 MG: 10 TABLET ORAL at 08:31

## 2020-02-02 RX ADMIN — PIPERACILLIN AND TAZOBACTAM 3.38 G: 3; .375 INJECTION, POWDER, LYOPHILIZED, FOR SOLUTION INTRAVENOUS at 05:46

## 2020-02-02 RX ADMIN — INSULIN ASPART 1 UNITS: 100 INJECTION, SOLUTION INTRAVENOUS; SUBCUTANEOUS at 12:21

## 2020-02-02 RX ADMIN — PANTOPRAZOLE SODIUM 40 MG: 40 TABLET, DELAYED RELEASE ORAL at 06:46

## 2020-02-02 RX ADMIN — PIPERACILLIN AND TAZOBACTAM 3.38 G: 3; .375 INJECTION, POWDER, LYOPHILIZED, FOR SOLUTION INTRAVENOUS at 23:39

## 2020-02-02 RX ADMIN — LEVOTHYROXINE SODIUM 88 MCG: 88 TABLET ORAL at 06:46

## 2020-02-02 RX ADMIN — DULOXETINE HYDROCHLORIDE 120 MG: 60 CAPSULE, DELAYED RELEASE ORAL at 08:31

## 2020-02-02 RX ADMIN — PIPERACILLIN AND TAZOBACTAM 3.38 G: 3; .375 INJECTION, POWDER, LYOPHILIZED, FOR SOLUTION INTRAVENOUS at 10:55

## 2020-02-02 RX ADMIN — IPRATROPIUM BROMIDE AND ALBUTEROL SULFATE 3 ML: .5; 3 SOLUTION RESPIRATORY (INHALATION) at 16:07

## 2020-02-02 RX ADMIN — AMLODIPINE BESYLATE 5 MG: 5 TABLET ORAL at 08:32

## 2020-02-02 RX ADMIN — SODIUM CHLORIDE: 9 INJECTION, SOLUTION INTRAVENOUS at 14:03

## 2020-02-02 RX ADMIN — Medication: at 09:12

## 2020-02-02 RX ADMIN — METHADONE HYDROCHLORIDE 20 MG: 10 TABLET ORAL at 21:53

## 2020-02-02 RX ADMIN — GABAPENTIN 1600 MG: 800 TABLET, FILM COATED ORAL at 21:54

## 2020-02-02 RX ADMIN — GABAPENTIN 1600 MG: 800 TABLET, FILM COATED ORAL at 16:11

## 2020-02-02 RX ADMIN — IPRATROPIUM BROMIDE AND ALBUTEROL SULFATE 3 ML: .5; 3 SOLUTION RESPIRATORY (INHALATION) at 08:43

## 2020-02-02 RX ADMIN — IPRATROPIUM BROMIDE AND ALBUTEROL SULFATE 3 ML: .5; 3 SOLUTION RESPIRATORY (INHALATION) at 12:37

## 2020-02-02 RX ADMIN — TRAZODONE HYDROCHLORIDE 100 MG: 100 TABLET ORAL at 21:54

## 2020-02-02 RX ADMIN — PIPERACILLIN AND TAZOBACTAM 3.38 G: 3; .375 INJECTION, POWDER, LYOPHILIZED, FOR SOLUTION INTRAVENOUS at 00:29

## 2020-02-02 RX ADMIN — GABAPENTIN 1600 MG: 800 TABLET, FILM COATED ORAL at 08:31

## 2020-02-02 RX ADMIN — POLYETHYLENE GLYCOL 3350 17 G: 17 POWDER, FOR SOLUTION ORAL at 08:32

## 2020-02-02 RX ADMIN — SIMVASTATIN 20 MG: 20 TABLET, FILM COATED ORAL at 21:54

## 2020-02-02 RX ADMIN — PIPERACILLIN AND TAZOBACTAM 3.38 G: 3; .375 INJECTION, POWDER, LYOPHILIZED, FOR SOLUTION INTRAVENOUS at 16:26

## 2020-02-02 RX ADMIN — MICAFUNGIN SODIUM 100 MG: 10 INJECTION, POWDER, LYOPHILIZED, FOR SOLUTION INTRAVENOUS at 12:10

## 2020-02-02 RX ADMIN — VANCOMYCIN HYDROCHLORIDE 2000 MG: 10 INJECTION, POWDER, LYOPHILIZED, FOR SOLUTION INTRAVENOUS at 13:26

## 2020-02-02 RX ADMIN — FUROSEMIDE 40 MG: 40 TABLET ORAL at 08:32

## 2020-02-02 RX ADMIN — INSULIN ASPART 1 UNITS: 100 INJECTION, SOLUTION INTRAVENOUS; SUBCUTANEOUS at 16:12

## 2020-02-02 RX ADMIN — INSULIN GLARGINE 27 UNITS: 100 INJECTION, SOLUTION SUBCUTANEOUS at 08:45

## 2020-02-02 ASSESSMENT — ACTIVITIES OF DAILY LIVING (ADL)
ADLS_ACUITY_SCORE: 28
ADLS_ACUITY_SCORE: 30

## 2020-02-02 NOTE — PROGRESS NOTES
[facility]    Infectious Disease Progress Note    Date of Service (when I saw the patient): 02/01/2020     Assessment & Plan   Bhavani White is a 64 year old female who was admitted on 1/25/2020.     Summary  65 yo morbidly (BMI > 45 kg/m^2) obese w complex abdominal infectoin  12/29/19  admitted w duodenal perforation  X lap x 2 / GJ tube / VAC for wound closure  1/25/20   Readmitted w abdl pain / drainage around GJ-tube  percut drain into abscess  Follow-up CT Jan 31 reduction in size fluid  Cultures w bacteria / yeast  On broad spectrum antimicrobial agents     Impression:  (K65.1) Intra-abdominal abscess (H)   After duodenal ulcer perforation / several surgeries  (E66.01) Elevated BMI  (B37.9) Infection due to Candida glabrata  (A49.1) Infection due to alpha-hemolytic Streptococcus  (Z86.14) History of MRSA infection  (D72.829) Leukocytosis, unspecified type       Recommendations:   Cont broad spect antibiotics and drainage  Follow up cultures  Continue other supportive care      Daniel Guillen MD    Interval History   No major change  No new headache  No new shortness of breath  No new urinary symptoms  No new headache      CURRENT ANTIBIOTICS    Physical Exam   Temp: 97  F (36.1  C) Temp src: Oral BP: 139/74   Heart Rate: 77 Resp: 16 SpO2: 98 % O2 Device: Nasal cannula Oxygen Delivery: 2 LPM  Vitals:    01/27/20 0600 01/30/20 0651 01/31/20 0610   Weight: 121.8 kg (268 lb 8.3 oz) 120.7 kg (266 lb) 134.7 kg (297 lb)     Vital Signs with Ranges  Temp:  [95  F (35  C)-97.5  F (36.4  C)] 97  F (36.1  C)  Heart Rate:  [67-77] 77  Resp:  [16] 16  BP: (121-139)/(64-74) 139/74  SpO2:  [83 %-98 %] 98 %    General In bed / no acute distress  Lungs  Diminished at bases  CV  Distant sounds / no murmur  Skin  No rash  abd  Many drains / G and J tubes / few sounds / soft / not really tender  Neuro  Normal conversatoin    Medications     dextrose       HYDROmorphone       sodium chloride 30 mL/hr at  02/01/20 1343       amLODIPine  5 mg Oral or Feeding Tube Daily     DULoxetine  120 mg Oral QAM     furosemide  40 mg Oral Daily     gabapentin  1,600 mg Oral or Feeding Tube TID     insulin aspart  1-6 Units Subcutaneous Q4H     insulin glargine  27 Units Subcutaneous QAM AC     ipratropium - albuterol 0.5 mg/2.5 mg/3 mL  3 mL Nebulization 4x daily     levothyroxine  88 mcg Oral or Feeding Tube QAM AC     methadone  20 mg Oral or Feeding Tube BID    Or     methadone  20 mg Oral BID     micafungin  100 mg Intravenous Q24H     pantoprazole  40 mg Oral BID AC    Or     pantoprazole  40 mg Oral or Feeding Tube BID AC     piperacillin-tazobactam  3.375 g Intravenous Q6H     polyethylene glycol  17 g Oral Daily     sennosides  2 tablet Oral BID     simvastatin  20 mg Oral or Feeding Tube At Bedtime     sodium chloride (PF)  10 mL Intracatheter Q8H     sodium chloride (PF)  10 mL Irrigation Q8H     sodium chloride (PF)  3 mL Intracatheter Q8H     vancomycin (VANCOCIN) IV  2,000 mg Intravenous Q24H       Data      IMAGING    1.31      CT CONCLUSION: Significant reduction in size of abdominal fluid collections, status post drainage catheter placement.    All microbiology laboratory data reviewed.  Recent Labs   Lab Test 01/31/20  0812 01/30/20  0726 01/29/20  0836   WBC 17.7* 15.1* 13.1*   HGB 9.6* 10.4* 9.8*   HCT 32.4* 35.2 32.6*   MCV 88 89 88    361 329     Recent Labs   Lab Test 02/01/20  0615 01/31/20  0812 01/30/20  0726   CR 0.52 0.51* 0.54       Recent Labs   01/27/20  1035 01/27/20  1000 01/25/20  2336 01/25/20  2241 01/02/20  1233 12/30/19  0425 12/29/19  1507 12/29/19  1435   Moderate growth  Streptococcus mitis group  Susceptibility testing not routinely done  *  Moderate growth  Haemophilus parainfluenzae  Susceptibility testing not routinely done  *  On day 1, isolated in broth only:  Candida glabrata complex  Susceptibility testing not routinely done  *  Culture in progress Moderate  growth  Streptococcus mitis group  Susceptibility testing not routinely done  *  Moderate growth  Haemophilus parainfluenzae  Susceptibility testing not routinely done  *  Light growth  Enterococcus faecalis  *  On day 1, isolated in broth only:  Candida glabrata complex  Susceptibility testing not routinely done  *  Susceptibility testing requested by  Dr Cardenas, [phone],  would like routine sens done on C. Glabrata. 1.31.20 1007. BENITO    Culture negative monitoring continues No growth  50,000 to 100,000 colonies/mL  Pseudomonas aeruginosa  * No growth  Heavy growth  Methicillin resistant Staphylococcus aureus (MRSA)  *  Heavy growth  Streptococcus mitis group  Susceptibility testing not routinely done  *  Heavy growth  Haemophilus parainfluenzae  Susceptibility testing not routinely done  * Cultured on the 1st day of incubation:  Staphylococcus hominis  *  Critical Value/Significant Value, preliminary result only, called to and read back by  Sangeeta Taylor RN [facility] 1.3.20 @3834 AE    Susceptibility testing done on previous specimen Methicillin resistant Staphylococcus aureus (MRSA) isolated* No growth Cultured on the 1st day of incubation:  Staphylococcus hominis  *  Critical Value/Significant Value, preliminary result only, called to and read back by  Suhail Escobar RN from [facility] ICU. 12/30/19 at 1202.TV.    Cultured on the 1st day of incubation:  Staphylococcus epidermidis  *  (Note)  POSITIVE for Staphylococci other than S.aureus, S.epidermidis and  S.lugdunensis, by [employer] multiplex nucleic acid test.  Coagulase-negative staphylococci are the most common venipuncture or  collection associated skin CONTAMINANTS grown in blood cultures.  Final identification and antimicrobial susceptibility testing will be  verified by standard methods.    Specimen tested with [employer] multiplex, gram-positive blood culture  nucleic acid test for the following targets: Staph aureus, Staph  epidermidis,  Staph lugdunensis, other Staph species, Enterococcus  faecalis, Enterococcus faecium, Streptococcus species, S. agalactiae,  S. anginosus grp., S. pneumoniae, S. pyogenes, Listeria sp., mecA  (methicillin resistance) and Nick/B (vancomycin resistance).    Critical Value/Significant Value called to and read back by FANNY SAMANO RN 12/30/2019 @ 4191 BC         Attestation:  TT 36 min  > 50 % CC

## 2020-02-02 NOTE — PROGRESS NOTES
Surgery    Had increased pain when drain was clamped yesterday.  Much better this morning.  Resting now.    Abdomen-drains in place.  Incision pink with fibrinous exudate stable.  No significant tenderness throughout.    A/P  Keep drain on gravity.  Continue small amounts of full liquids.  Drain outputs continue to decrease and can be likely removed tomorrow if they stay low.  Continue medical care.    Ayan Lopez M.D.  [facility]  [phone]

## 2020-02-02 NOTE — PROGRESS NOTES
"[facility]  WO Nurse Inpatient Wound Assessment   Reason for consultation: Evaluate and treat: Abdominal Incision    Assessment    Open abd incision: Increase in granulation tissue and autolytic debridement of slough. Sutures visible @ wound bed. No purulent drainage  Dr Carrington present to assess ABD incision and all tube sites. Pt is being prepped for CT  Treatment Plan  Plan of care open abdominal incision: change dressing Daily   1. Clean incision with MicroKlenz  2. Cut approx 12\" of 1/2\" plain Iodoform packing gauze  3. Dampen with VASHE. (blue bottle) Squeeze out excess  4. Using Q-tip pack into tunnel @ noon and pack moving top to bottom of wound bed  5. Cover with 4 x 4 folded in half  6. Secure with Medipore tape     All tube site: change on odd days and prn  1. Gently clean with MicroKlenz  2. Apply drain sponges to each tube without a stabilizer  3. Secure with Medipore tape      Orders In Epic  WO Nurse follow-up plan: weekly   Nursing to notify the Provider(s) and re-consult the [facility] Nurse if wound(s) deteriorates or new skin concern.    Patient History  According to provider note(s):  Bhavani White is a 64 year old female with hx of DM2, chronic cystitis s/p chronic SP catheter, chronic pain on methadone maintenance, asthma, ANIYA, and hypothyroidism who was admitted to the ICU service on 12/29/2019 for a perforated duodenal ulcer with associated septic shock and bowel ischemia. She is s/p ex-lap x2, but perforation could not be entirely repaired due to size; she is s/p THAI drains and GJ tube placement. Hospital course has otherwise been complicated by stress cardiomyopathy. She was weaned off pressors on 1/5 and successfully extubated on 1/6. She was transferred the the Hospitalist service on 1/6.      Acute metabolic encephalopathy, Improving  Multifactorial due to hemorrhagic/septic shock, ICU delirium. She was started on scheduled Seroquel while in the ICU which was discontinued with " improvement in her mental status   - Currently alert and oriented  - Seroquel 25 mg TID PRN for anxiety, agitation     Perforated duodenal ulcer with hemorrhagic shock and bowel ischemia, s/p ex lap, TRUONG, Malecot drain placement in perforation, and wound VAC on 12/29/2019, repeat ex-lap, TRUONG, closure of duodenal perforation over Malecot, G-tube/J-tube placements, and delayed primary closure on 1/1/2020    Re-admit 1-  Pt with complicated abdominal process.  Admitted with abdomen pain and purulent drainage from around G-tube  CT shows two large abscess cavities  We will have IR drain these collections, okay to wait until 1/27  Continue with G-tube to gravity/suction  Daily dressing changes and re-packing of abdomen wound   May resume J-tube feeds  No plans for repeat surgery  Art Willingham MD    1-27-20 Esther Bonilla PA-C excised skin bridges @ bedside. See surgery note    Objective Data  Containment of urine/stool: SP for UIC and Incontinence protocol for [facility]    Active Diet Order: TF to resume when medically indicated  Orders Placed This Encounter      Full Liquid Diet    Output:   I/O last 3 completed shifts:  In: 1406.5 [P.O.:240; I.V.:101.5; NG/GT:435]  Out: 5210 [Urine:4450; Emesis/NG output:675; Drains:85]    Risk Assessment:   Sensory Perception: 3-->slightly limited  Moisture: 3-->occasionally moist  Activity: 1-->bedfast  Mobility: 2-->very limited  Nutrition: 3-->adequate  Friction and Shear: 1-->problem  Joe Score: 13                          Labs:   Recent Labs   Lab 02/02/20  0550  01/27/20  0826   HGB 9.8*   < >  --    INR  --   --  1.18*   WBC 16.0*   < >  --     < > = values in this interval not displayed.       Physical Exam  Abdominal Inspection:     Rotund but soft.  Incision open with sutures visible in wound bed   G tube venting, J tube feeds going, Malecot bilious, THAI drain serous, IR drains purulent drainage    1-27-29: Esther Bonilla excised skin bridges @ bedside. See surgery note      WOC  Photo: 29 abdomen with drains        [facility] Photo: 20  Open abd incision with skin bridges        [facility] Photo: 20 Abd incision        Size:  12.0cm x 2.5cm with 50% yellow slough /50% pink/red base with sutures visible @ base  Tunnelin-7cm @ 12 o'clock  Drainage: none  Odor: none  Pain: Denies    Interventions  Current support surface Bariatric pulsate - ordered  Current off-loading measures: pillows / chair cushion  Visual inspection of wound(s) completed  Wound Care: Vashe packing to opened incision . No change to current plan  Supplies: In pt room   Education provided: Discussed importance of repositioning and HOB below 30 degrees if medically indicated      Maty Epstein RN  [facility] Nurse

## 2020-02-02 NOTE — PHARMACY-VANCOMYCIN DOSING SERVICE
- PRN Xanax  - consulted hem/onc psych   Pharmacy Vancomycin Note  Date of Service 2020  Patient's  1955   64 year old, female    Indication: Intra-abdominal infection  Goal Trough Level: 15-20 mg/L  Day of Therapy: 8  Current Vancomycin regimen:  2000 mg IV q24h    Current estimated CrCl = Estimated Creatinine Clearance: 163.4 mL/min (A) (based on SCr of 0.49 mg/dL (L)).    Creatinine for last 3 days  2020:  8:12 AM Creatinine 0.51 mg/dL  2020:  6:15 AM Creatinine 0.52 mg/dL  2020:  5:50 AM Creatinine 0.49 mg/dL    Recent Vancomycin Levels (past 3 days)  2020: 12:06 AM Vancomycin Level 23.9 mg/L  2020: 12:05 PM Vancomycin Level 14.9 mg/L    Vancomycin IV Administrations (past 72 hours)                   vancomycin (VANCOCIN) 2,000 mg in sodium chloride 0.9 % 500 mL intermittent infusion (mg) 2,000 mg New Bag 20 1326     2,000 mg New Bag 20 1318     2,000 mg New Bag 20 1223                Nephrotoxins and other renal medications (From now, onward)    Start     Dose/Rate Route Frequency Ordered Stop    20 1200  vancomycin (VANCOCIN) 2,000 mg in sodium chloride 0.9 % 500 mL intermittent infusion      2,000 mg  over 2 Hours Intravenous EVERY 24 HOURS 20 0159      20 0900  furosemide (LASIX) tablet 40 mg      40 mg Oral DAILY 20 1413      20 1000  piperacillin-tazobactam (ZOSYN) 3.375 g vial to attach to  mL bag     Note to Pharmacy:  Pharmacy can adjust dose based on renal function.    3.375 g  over 1 Hours Intravenous EVERY 6 HOURS 20 0957               Contrast Orders - past 72 hours (72h ago, onward)    Start     Dose/Rate Route Frequency Ordered Stop    20 1230  iopamidol (ISOVUE-370) solution 135 mL      135 mL Intravenous ONCE 20 1217 20 1255    20 1015  iohexol (OMNIPAQUE) solution 50 mL      50 mL Oral ONCE 20 0944 20 1016          Interpretation of levels and current regimen:  Trough level is  Therapeutic    Has  serum creatinine changed > 50% in last 72 hours: No    Urine output:  good urine output    Renal Function: Stable    Plan:  1.  Continue Current Dose  2.  Pharmacy will check trough levels as appropriate in 3-5 Days.    3. Serum creatinine levels will be ordered a minimum of twice weekly.      Lilia Winter RPH (Kelly)        .

## 2020-02-02 NOTE — PLAN OF CARE
A &O x 4, VSS. Pain controled w/PCA. LS dim, on 2 L O2, de-sats to high 70's on room air. G-tube open to gravity.  Refused repositioning overnight despite reinforcement of pressure injury education. Mepilex on thighs and coccyx intact. Dressings around drains and tubes all CDI; heels elevated. TF going at goal rate. Triple lumen PICC w/good blood return noted on all lumens. No BM overnight. Discharge pending.

## 2020-02-02 NOTE — PROGRESS NOTES
Shift summary  Bhavani White with abdominal abcess.  Respiratory history: corie, asthma  Currently on 2 lpm NC,sats - 96, RR-16.   LUNGS:  clear  A/p  - continue nebs as ordered   - monitor and wean 02 as tolerated   Shaun Carlson, RT

## 2020-02-02 NOTE — PLAN OF CARE
Pt A/Ox4, VSS on 1-2 L NC. C/o abdominal pain, has PCA and scheduled methadone.  G tube clamped per MD, pt experienced intense LUQ pain and difficulty breathing, MD notified and tube re-opened to gravity. Pt still refusing side to side turns despite education on pressure injury prevention.  Heels elevated.  Mepilexes to skin tears on buttocks/thigh changed, wound care to midline incision done per POC.  R PICC infusing NS TKO + PCA dilaudid, intermittent abx.  Adequate UOP from suprapubic cath, no BM this shift.  Possible drain removal next 1-2 days, discharge pending.

## 2020-02-02 NOTE — PLAN OF CARE
Problem: Infection  Goal: Infection Symptom Resolution  2/2/2020 1759 by Corinna Carey, RN  Outcome: Improving   VSS, A/O, up with a lift.  Weaned from O2 today.  Assist 2 for bed mobility.  Tolerating full liquid diet.  Pain is well controlled with PCA pump and scheduled methadone, Increase in available PCA dosing added today.  3 THAI's, suprapubic cath with excellent clear output, Malecot drain, J tube, G tube to gravity, abdominal wound, all dressings changed today.  Refused some repositioning, will allow gentle repositioning.  Mepilex on coccyx and Bilateral hips/buttocks CDI.  TF at goal rate, flushes programmed.

## 2020-02-02 NOTE — PROGRESS NOTES
[facility]    Hospitalist Progress Note    Assessment & Plan   Bhavani White is a 64 year old female who was admitted on 1/25/2020.  Patient had a long stay at [facility] and was discharged on 21 January, reported back on 26th May due to increased abdominal abscess.    Complex anterior upper abdominal fluid collections, recent discharge on January 21, 2020 status post perforated duodenal ulcer status post exploratory laparotomy primary closure delayed on January 1, 2020: Patient with recent discharge on January 21 of 2020 for a perforated duodenal ulcer and associated hemorrhagic shock and bowel ischemia status post exploratory laparotomy with drain placement and wound VAC.  Per report, patient with GJ tube and yellow discharge leaking around the site this afternoon prior to admission.  However, staff at facility noted more discharge and decided to call EMS, patient sent to the emergency department for further evaluation and treatment.  CT on admission shows complex anterior upper abdominal fluid collections more loculated than previous, likely developing abscesses with increased volume, communication with left upper quadrant collection to the multiloculated anterior epigastric collection, increased ascites, pleural effusions, suprapubic bladder catheter and percutaneous jejunostomy, see report for further details.  - Appreciate surgery input, consulted interventional radiology, she underwent additional drain placements 1/27  - Continue IV Vancomycin and Zosyn. Micafungin added 1/30. ID following.  - CT A/P 1/31 showed decrease in fluid collections  -IR consult  pending following GJ closure tube base being broken on 1/31  - dilaudid PCA for better pain control.  Patient complains of increased pain, PCA adjusted  -Patient had a new PICC line placed on 1/31, her G-tube was clamped yesterday and patient complained about increased abdominal pain and distention and it was opened.    Acute on  chronic constipation: pt says she normally only has a BM about once a week. It had been over a week as of today.  -Patient had multiple bowel movements 1/31, denies any increased pain today    Cardiac, hyperlipidemia, hypertension: Patient maintained on statin prior to admission.  Previous echocardiogram with EF at 60 to 65%, right ventricular global function probably normal, per report.  -Continue simvastatin and amlodipine  - pt is up 16 lbs since admission. Diuresing but very carefully given Vanc/Zosyn and risk of kidney injury.  -Monitor BMP    Pseudomonas in urine: defer to ID whether this is colonizer vs pathogenic. 50-100k organisms.  --Patient is already on Zosyn     Diabetes mellitus, insulin-dependent: Patient maintained on Lantus prior to admission. Good glycemic control here.  -Continue prior to admission Lantus 27 units subcutaneous every morning  -Insulin sliding scale, medium.     Hypothyroidism: Stable.  -Continue prior to admission levothyroxine      Chronic pain, on chronic methadone, anxiety, depression: Patient maintained on methadone prior to admission.  -PTA medications were restarted including gabapentin, methadone and Cymbalta.     GERD: Stable.  -Continue PPI     Insomnia: Stable.  -Continue prior to admission trazodone    DVT Prophylaxis: sequential compression device   Code Status: Full Code     Disposition: anticipate prolonged hospitalization    Grisel Chicas MD    Interval History   Her diet was advanced yesterday, she is tolerating that well, she complains about increased pain on the left upper quadrant, she is complaining it around the location of her new drain for the abscess, she is on the PCA and occasionally is very sleepy, her PCA suggested.    -Data reviewed today: I reviewed all new labs and imaging results over the last 24 hours.     Physical Exam   Temp: 96.6  F (35.9  C) Temp src: Oral BP: 116/57 Pulse: 75 Heart Rate: 75 Resp: 16 SpO2: 96 % O2 Device: Nasal cannula Oxygen  Delivery: 2 LPM  Vitals:    01/27/20 0600 01/30/20 0651 01/31/20 0610   Weight: 121.8 kg (268 lb 8.3 oz) 120.7 kg (266 lb) 134.7 kg (297 lb)     Vital Signs with Ranges  Temp:  [95  F (35  C)-98.8  F (37.1  C)] 96.6  F (35.9  C)  Pulse:  [75] 75  Heart Rate:  [67-83] 75  Resp:  [16] 16  BP: (116-139)/(57-74) 116/57  SpO2:  [94 %-98 %] 96 %  I/O last 3 completed shifts:  In: 1406.5 [P.O.:240; I.V.:101.5; NG/GT:435]  Out: 5210 [Urine:4450; Emesis/NG output:675; Drains:85]    Constitutional: Awake, alert, cooperative, no apparent distress. Morbidly obese.  Respiratory: Basilar crackles noted  Cardiovascular: Regular rate and rhythm, normal S1 and S2, and no murmur noted  GI: G-tube noted suprapubic catheter noted, she has healing incision, has multiple THAI drains placed by IR, bowel sounds are reduced.  Skin/Integumen: No rashes, no cyanosis, 1+ edema  Neuro : moving all 4 extremities, no focal deficit noted     Medications     dextrose       HYDROmorphone       sodium chloride 30 mL/hr at 02/01/20 1343       amLODIPine  5 mg Oral or Feeding Tube Daily     DULoxetine  120 mg Oral QAM     furosemide  40 mg Oral Daily     gabapentin  1,600 mg Oral or Feeding Tube TID     insulin aspart  1-6 Units Subcutaneous Q4H     insulin glargine  27 Units Subcutaneous QAM AC     ipratropium - albuterol 0.5 mg/2.5 mg/3 mL  3 mL Nebulization 4x daily     levothyroxine  88 mcg Oral or Feeding Tube QAM AC     methadone  20 mg Oral or Feeding Tube BID    Or     methadone  20 mg Oral BID     micafungin  100 mg Intravenous Q24H     pantoprazole  40 mg Oral BID AC    Or     pantoprazole  40 mg Oral or Feeding Tube BID AC     piperacillin-tazobactam  3.375 g Intravenous Q6H     polyethylene glycol  17 g Oral Daily     sennosides  2 tablet Oral BID     simvastatin  20 mg Oral or Feeding Tube At Bedtime     sodium chloride (PF)  10 mL Intracatheter Q8H     sodium chloride (PF)  10 mL Irrigation Q8H     sodium chloride (PF)  3 mL Intracatheter  Q8H     vancomycin (VANCOCIN) IV  2,000 mg Intravenous Q24H       Data   Recent Labs   Lab 02/02/20  0550 02/01/20  0615 01/31/20  0812 01/30/20  0726  01/27/20  0826   WBC 16.0*  --  17.7* 15.1*   < >  --    HGB 9.8*  --  9.6* 10.4*   < >  --    MCV 88  --  88 89   < >  --      --  387 361   < >  --    INR  --   --   --   --   --  1.18*     --  138 140  --   --    POTASSIUM 3.7  --  3.6 3.7  --   --    CHLORIDE 96  --  98 101  --   --    CO2 41*  --  39* 36*  --   --    BUN 19  --  21 20  --   --    CR 0.49* 0.52 0.51* 0.54   < >  --    ANIONGAP 1*  --  1* 3  --   --    DORY 9.5  --  9.5 9.8  --   --    *  --  143* 143*  --   --     < > = values in this interval not displayed.     Recent Labs   Lab 02/02/20  0550 02/02/20  0331 02/01/20  2349 02/01/20  2125 02/01/20 2018 02/01/20  1626  01/31/20  0812  01/30/20  0726   *  --   --   --   --   --   --  143*  --  143*   BGM  --  137* 133* 146* 143* 128*   < >  --    < >  --     < > = values in this interval not displayed.       Imaging:   No results found for this or any previous visit (from the past 24 hour(s)).

## 2020-02-03 LAB
ANION GAP SERPL CALCULATED.3IONS-SCNC: <1 MMOL/L (ref 3–14)
BACTERIA SPEC CULT: NORMAL
BACTERIA SPEC CULT: NORMAL
BUN SERPL-MCNC: 19 MG/DL (ref 7–30)
CALCIUM SERPL-MCNC: 9.5 MG/DL (ref 8.5–10.1)
CHLORIDE SERPL-SCNC: 97 MMOL/L (ref 94–109)
CO2 SERPL-SCNC: 42 MMOL/L (ref 20–32)
CREAT SERPL-MCNC: 0.53 MG/DL (ref 0.52–1.04)
ERYTHROCYTE [DISTWIDTH] IN BLOOD BY AUTOMATED COUNT: 15.1 % (ref 10–15)
GFR SERPL CREATININE-BSD FRML MDRD: >90 ML/MIN/{1.73_M2}
GLUCOSE BLDC GLUCOMTR-MCNC: 101 MG/DL (ref 70–99)
GLUCOSE BLDC GLUCOMTR-MCNC: 121 MG/DL (ref 70–99)
GLUCOSE BLDC GLUCOMTR-MCNC: 124 MG/DL (ref 70–99)
GLUCOSE BLDC GLUCOMTR-MCNC: 133 MG/DL (ref 70–99)
GLUCOSE BLDC GLUCOMTR-MCNC: 137 MG/DL (ref 70–99)
GLUCOSE BLDC GLUCOMTR-MCNC: 147 MG/DL (ref 70–99)
GLUCOSE BLDC GLUCOMTR-MCNC: 185 MG/DL (ref 70–99)
GLUCOSE BLDC GLUCOMTR-MCNC: 96 MG/DL (ref 70–99)
GLUCOSE SERPL-MCNC: 147 MG/DL (ref 70–99)
HCT VFR BLD AUTO: 30.7 % (ref 35–47)
HGB BLD-MCNC: 8.8 G/DL (ref 11.7–15.7)
Lab: NORMAL
Lab: NORMAL
MCH RBC QN AUTO: 25.5 PG (ref 26.5–33)
MCHC RBC AUTO-ENTMCNC: 28.7 G/DL (ref 31.5–36.5)
MCV RBC AUTO: 89 FL (ref 78–100)
PLATELET # BLD AUTO: 407 10E9/L (ref 150–450)
POTASSIUM SERPL-SCNC: 3.8 MMOL/L (ref 3.4–5.3)
RBC # BLD AUTO: 3.45 10E12/L (ref 3.8–5.2)
SODIUM SERPL-SCNC: 138 MMOL/L (ref 133–144)
SPECIMEN SOURCE: NORMAL
SPECIMEN SOURCE: NORMAL
WBC # BLD AUTO: 16.1 10E9/L (ref 4–11)

## 2020-02-03 PROCEDURE — 80048 BASIC METABOLIC PNL TOTAL CA: CPT | Performed by: INTERNAL MEDICINE

## 2020-02-03 PROCEDURE — 25000132 ZZH RX MED GY IP 250 OP 250 PS 637: Mod: GY | Performed by: HOSPITALIST

## 2020-02-03 PROCEDURE — 25000132 ZZH RX MED GY IP 250 OP 250 PS 637: Mod: GY

## 2020-02-03 PROCEDURE — 12000000 ZZH R&B MED SURG/OB

## 2020-02-03 PROCEDURE — 40000275 ZZH STATISTIC RCP TIME EA 10 MIN

## 2020-02-03 PROCEDURE — 25000128 H RX IP 250 OP 636: Performed by: INTERNAL MEDICINE

## 2020-02-03 PROCEDURE — 25000132 ZZH RX MED GY IP 250 OP 250 PS 637: Mod: GY | Performed by: INTERNAL MEDICINE

## 2020-02-03 PROCEDURE — 94640 AIRWAY INHALATION TREATMENT: CPT

## 2020-02-03 PROCEDURE — 25000128 H RX IP 250 OP 636: Performed by: HOSPITALIST

## 2020-02-03 PROCEDURE — 00000146 ZZHCL STATISTIC GLUCOSE BY METER IP

## 2020-02-03 PROCEDURE — 25000125 ZZHC RX 250: Performed by: HOSPITALIST

## 2020-02-03 PROCEDURE — 99233 SBSQ HOSP IP/OBS HIGH 50: CPT | Performed by: INTERNAL MEDICINE

## 2020-02-03 PROCEDURE — 40000239 ZZH STATISTIC VAT ROUNDS

## 2020-02-03 PROCEDURE — 94640 AIRWAY INHALATION TREATMENT: CPT | Mod: 76

## 2020-02-03 PROCEDURE — 25000131 ZZH RX MED GY IP 250 OP 636 PS 637: Mod: GY | Performed by: INTERNAL MEDICINE

## 2020-02-03 PROCEDURE — 25800030 ZZH RX IP 258 OP 636: Performed by: HOSPITALIST

## 2020-02-03 PROCEDURE — 85027 COMPLETE CBC AUTOMATED: CPT | Performed by: INTERNAL MEDICINE

## 2020-02-03 PROCEDURE — 94645 CONT INHLJ TX EACH ADDL HOUR: CPT

## 2020-02-03 PROCEDURE — 25800030 ZZH RX IP 258 OP 636: Performed by: INTERNAL MEDICINE

## 2020-02-03 RX ADMIN — GABAPENTIN 1600 MG: 800 TABLET, FILM COATED ORAL at 08:54

## 2020-02-03 RX ADMIN — DULOXETINE HYDROCHLORIDE 120 MG: 60 CAPSULE, DELAYED RELEASE ORAL at 08:54

## 2020-02-03 RX ADMIN — PANTOPRAZOLE SODIUM 40 MG: 40 TABLET, DELAYED RELEASE ORAL at 16:30

## 2020-02-03 RX ADMIN — PIPERACILLIN AND TAZOBACTAM 3.38 G: 3; .375 INJECTION, POWDER, LYOPHILIZED, FOR SOLUTION INTRAVENOUS at 17:02

## 2020-02-03 RX ADMIN — INSULIN GLARGINE 27 UNITS: 100 INJECTION, SOLUTION SUBCUTANEOUS at 09:03

## 2020-02-03 RX ADMIN — LEVOTHYROXINE SODIUM 88 MCG: 88 TABLET ORAL at 06:47

## 2020-02-03 RX ADMIN — FUROSEMIDE 40 MG: 40 TABLET ORAL at 08:54

## 2020-02-03 RX ADMIN — SIMVASTATIN 20 MG: 20 TABLET, FILM COATED ORAL at 22:18

## 2020-02-03 RX ADMIN — IPRATROPIUM BROMIDE AND ALBUTEROL SULFATE 3 ML: .5; 3 SOLUTION RESPIRATORY (INHALATION) at 11:28

## 2020-02-03 RX ADMIN — GABAPENTIN 1600 MG: 800 TABLET, FILM COATED ORAL at 22:18

## 2020-02-03 RX ADMIN — SENNOSIDES 1 TABLET: 8.6 TABLET, FILM COATED ORAL at 08:54

## 2020-02-03 RX ADMIN — IPRATROPIUM BROMIDE AND ALBUTEROL SULFATE 3 ML: .5; 3 SOLUTION RESPIRATORY (INHALATION) at 08:50

## 2020-02-03 RX ADMIN — PIPERACILLIN AND TAZOBACTAM 3.38 G: 3; .375 INJECTION, POWDER, LYOPHILIZED, FOR SOLUTION INTRAVENOUS at 22:24

## 2020-02-03 RX ADMIN — MICAFUNGIN SODIUM 100 MG: 10 INJECTION, POWDER, LYOPHILIZED, FOR SOLUTION INTRAVENOUS at 12:29

## 2020-02-03 RX ADMIN — SENNOSIDES 2 TABLET: 8.6 TABLET, FILM COATED ORAL at 20:50

## 2020-02-03 RX ADMIN — TRAZODONE HYDROCHLORIDE 100 MG: 100 TABLET ORAL at 22:18

## 2020-02-03 RX ADMIN — IPRATROPIUM BROMIDE AND ALBUTEROL SULFATE 3 ML: .5; 3 SOLUTION RESPIRATORY (INHALATION) at 16:15

## 2020-02-03 RX ADMIN — AMLODIPINE BESYLATE 5 MG: 5 TABLET ORAL at 08:56

## 2020-02-03 RX ADMIN — VANCOMYCIN HYDROCHLORIDE 2000 MG: 10 INJECTION, POWDER, LYOPHILIZED, FOR SOLUTION INTRAVENOUS at 13:57

## 2020-02-03 RX ADMIN — Medication: at 14:05

## 2020-02-03 RX ADMIN — IPRATROPIUM BROMIDE AND ALBUTEROL SULFATE 3 ML: .5; 3 SOLUTION RESPIRATORY (INHALATION) at 19:34

## 2020-02-03 RX ADMIN — PIPERACILLIN AND TAZOBACTAM 3.38 G: 3; .375 INJECTION, POWDER, LYOPHILIZED, FOR SOLUTION INTRAVENOUS at 05:26

## 2020-02-03 RX ADMIN — INSULIN ASPART 1 UNITS: 100 INJECTION, SOLUTION INTRAVENOUS; SUBCUTANEOUS at 17:01

## 2020-02-03 RX ADMIN — METHADONE HYDROCHLORIDE 20 MG: 10 TABLET ORAL at 08:54

## 2020-02-03 RX ADMIN — PANTOPRAZOLE SODIUM 40 MG: 40 TABLET, DELAYED RELEASE ORAL at 06:47

## 2020-02-03 RX ADMIN — PIPERACILLIN AND TAZOBACTAM 3.38 G: 3; .375 INJECTION, POWDER, LYOPHILIZED, FOR SOLUTION INTRAVENOUS at 11:03

## 2020-02-03 RX ADMIN — ALBUTEROL SULFATE 2.5 MG: 2.5 SOLUTION RESPIRATORY (INHALATION) at 04:10

## 2020-02-03 RX ADMIN — METHADONE HYDROCHLORIDE 20 MG: 10 TABLET ORAL at 20:50

## 2020-02-03 RX ADMIN — POLYETHYLENE GLYCOL 3350 17 G: 17 POWDER, FOR SOLUTION ORAL at 08:53

## 2020-02-03 RX ADMIN — GABAPENTIN 1600 MG: 800 TABLET, FILM COATED ORAL at 16:30

## 2020-02-03 ASSESSMENT — ACTIVITIES OF DAILY LIVING (ADL)
ADLS_ACUITY_SCORE: 28

## 2020-02-03 NOTE — PROGRESS NOTES
[facility] Progress Note  [facility]    Visit with pt, per her lengthy hospital stay.  Pt described some of her recent health challenges, and noted that in addition to all that she's gone through physically, the ED staff lost her teeth.    Pt is also concerned about her , who is home alone without her.  Pt referenced support from family, including her brother and sister (who are both presently out of town).  Provided reflective listening and support, and shared prayer with her.  SH team will continue to be available for support, per need or request.                                                                                                                                                 Traci Santana M.A.  Staff   Pager [phone]  Phone [phone]

## 2020-02-03 NOTE — PROGRESS NOTES
Bhavani White is a 64 year old woman with a history of obesity, fibromyalgia, chronic back pain on methadone and a perforated duodenal ulcer in 12/2019 status post 3 surgeries to repair with last exploratory laparotomy on January 1, 2020 s/p 2 mallencott drains, gastrostomy tube for decompression, jejunostomy tube for nutrition among other treatments, discharged form the hospital on 1/21/2020 and readmitted to the hospital on 1/26/2020 with abdominal pain and fevers.      CT was done which showed increase in size of a right and left upper abdomen fluid collections possible abscesses. CT guided drain was placed in each collection 1/27/2020 with a large amount of drainage, left 400 mL and Right 170 mL 1/27/2020.      Bhavani is being seen in IR follow up today.     S: Pain is better, using PCA. Starting full liquid diet.  slept over during the weekend.     O: Temp:  [96.4  F (35.8  C)-98.9  F (37.2  C)] 96.9  F (36.1  C)  Heart Rate:  [72-82] 72  Resp:  [16] 16  BP: (116-131)/(55-71) 129/69  SpO2:  [92 %-97 %] 97 %    Labs, notes, imaging, IOs and VSs reviewed    ROUTINE ICU LABS (Last four results)  CMP  Recent Labs   Lab 02/03/20  0600 02/02/20  0550 02/01/20  0615 01/31/20  0812 01/30/20  0726    138  --  138 140   POTASSIUM 3.8 3.7  --  3.6 3.7   CHLORIDE 97 96  --  98 101   CO2 42* 41*  --  39* 36*   ANIONGAP <1* 1*  --  1* 3   * 134*  --  143* 143*   BUN 19 19  --  21 20   CR 0.53 0.49* 0.52 0.51* 0.54   GFRESTIMATED >90 >90 >90 >90 >90   GFRESTBLACK >90 >90 >90 >90 >90   DORY 9.5 9.5  --  9.5 9.8   MAG  --  1.9  --   --   --      CBC  Recent Labs   Lab 02/03/20  0600 02/02/20  0550 01/31/20  0812 01/30/20  0726   WBC 16.1* 16.0* 17.7* 15.1*   RBC 3.45* 3.81 3.70* 3.97   HGB 8.8* 9.8* 9.6* 10.4*   HCT 30.7* 33.5* 32.4* 35.2   MCV 89 88 88 89   MCH 25.5* 25.7* 25.9* 26.2*   MCHC 28.7* 29.3* 29.6* 29.5*   RDW 15.1* 15.2* 15.0 15.3*    429 387 361     INRNo lab results found in last 7  days.  Arterial Blood GasNo lab results found in last 7 days.    General-Alert, oriented, bright, pleasant woman in NAD   -Right and Left radiology drain dressings CD&I  -Both flushed easily with a total of 20 mL NS without pain or leaking and able to aspirate out what was flushed in.  -Output-Milky grey to serosanguinous with some whitish tissue bilaterally upon aspiration. .   Output-Left 1/27 400 mL; 1/28 135 mL; 1/29 80 mL; 1/30 140 mL; 1/31 80 mL; 2/1 55 mL; 2/2 45 mL; 2/3 30 so far today  Right 1/27 170  mL; 1/28 210 mL; 1/29 70 mL; 1/30 45 mL; 1/31 60 mL; 2/1 45 mL; 2/2 80 mL; 2/3 50 mL so far today    Culture R and L UQ-Moderate growth Streptococcus mitis group, Moderate growth Haemophilus parainfluenzae    A/P: Bhavani White is a 64 year old woman with a history of obesity, fibromyalgia, chronic back pain on methadone and a perforated duodenal ulcer in 12/2019 status post 3 surgeries to repair with last exploratory laparotomy on January 1, 2020 s/p 2 mallencott drains, gastrostomy tube for decompression, jejunostomy tube for nutrition among other treatments, discharged form the hospital on 1/21/2020 and readmitted to the hospital on 1/26/2020 with abdominal pain and fevers.      CT was done which showed increase in size of a right and left upper abdomen fluid collections possible abscesses. CT guided drain was placed in each collection 1/27/2020 with a large amount of drainage, left 400 mL and Right 170 mL 1/27/2020.     R and L upper quadrant fluid drainage outputs slowly decreasing but probably not enough to remove yet. WBC 16, afebrile. Bhavani is feeling better.     Thanks Angelica [facility] Interventional Radiology CNP ([phone]) (phone [phone])

## 2020-02-03 NOTE — PLAN OF CARE
Afebrile. drains with decreased output but remain in place. C/o generalized pain, especially back and abdomen. Does not tolerate much PO intake. Repositioned q2.

## 2020-02-03 NOTE — PLAN OF CARE
A &O x 4, VSS. On 2 L O2 and LS dim, getting prn nebs. Up with lift, has not been OOB this shift. Tolerating full liquid diet.  Pain controlled with PCA pump and scheduled methadone. 3 THAI's, suprapubic cath with good UOP, Malecot drain, GJ tube w/tube feed going at goal rate of 60 mL/hr, G tube to gravity, abdominal wound, all dressings CDI and changed 2/2.  Refused repositioning overnight, did allow repo during days and evenings. Mepilex on coccyx and Bilateral hips/buttocks CDI.

## 2020-02-03 NOTE — PROGRESS NOTES
[facility]    Infectious Disease Progress Note    Date of Service (when I saw the patient): 02/03/2020     Assessment & Plan   Bhavani White is a 64 year old female who was admitted on 1/25/2020.       Impression:  1. 64 y.o who was recently admitted to the hospital was discharged on 21 January, that was a prolonged stay for perforated duodenal ulcer, s/p ex lap, TRUONG, Malecot drain placement with perforation, and wound VAC on 12/29/2019. Repeat ex-lap, TRUONG, closure of duodenal perforation over Malecot, G-tube/J-tube placements, delayed primary closure on 1/1/2020.  2. Admitted this occasion with abdominal pain, drainage from the G tube site.   3. CT scan with two large abscess cavities.   4. S/P multiple drain placement.   5. 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   6. She is on IV vancomycin and zosyn.   7. Diabetes.      Recommendations:   Continue zosyn for the: enterococcus, strep, h parainfluenza and also since intraabdominal abscesses for possible anaerobes which might be involved but not growing.   Continue on Vancomycin for the MRSA from the cultures.   On micafungin for the candida glabrata, can be switched to oral fluc high dose when ready for discharge.   Repeat CT scan is showing improvement in the size of the collections.       Chacho Solis MD    Interval History   Feels tiered   Sleepy   No fever     Physical Exam   Temp: 98.2  F (36.8  C) Temp src: Oral BP: 116/65   Heart Rate: 76 Resp: 16 SpO2: 93 % O2 Device: Nasal cannula Oxygen Delivery: 2 LPM  Vitals:    01/27/20 0600 01/30/20 0651 01/31/20 0610   Weight: 121.8 kg (268 lb 8.3 oz) 120.7 kg (266 lb) 134.7 kg (297 lb)     Vital Signs with Ranges  Temp:  [96.4  F (35.8  C)-98.9  F (37.2  C)] 98.2  F (36.8  C)  Heart Rate:  [72-82] 76  Resp:  [16] 16  BP: (116-131)/(55-71) 116/65  SpO2:  [92 %-97 %] 93 %    Constitutional: Awake, alert, cooperative, no apparent distress  Lungs: Clear to  auscultation bilaterally, no crackles or wheezing  Cardiovascular: Regular rate and rhythm, normal S1 and S2, and no murmur noted  Abdomen: multiple drains   Skin: No rashes, no cyanosis, no edema  Other:    Medications     dextrose       HYDROmorphone       sodium chloride 10 mL/hr at 02/02/20 1403       amLODIPine  5 mg Oral or Feeding Tube Daily     DULoxetine  120 mg Oral QAM     furosemide  40 mg Oral Daily     gabapentin  1,600 mg Oral or Feeding Tube TID     insulin aspart  1-6 Units Subcutaneous Q4H     insulin glargine  27 Units Subcutaneous QAM AC     ipratropium - albuterol 0.5 mg/2.5 mg/3 mL  3 mL Nebulization 4x daily     levothyroxine  88 mcg Oral or Feeding Tube QAM AC     methadone  20 mg Oral or Feeding Tube BID    Or     methadone  20 mg Oral BID     micafungin  100 mg Intravenous Q24H     pantoprazole  40 mg Oral BID AC    Or     pantoprazole  40 mg Oral or Feeding Tube BID AC     piperacillin-tazobactam  3.375 g Intravenous Q6H     polyethylene glycol  17 g Oral Daily     sennosides  2 tablet Oral BID     simvastatin  20 mg Oral or Feeding Tube At Bedtime     sodium chloride (PF)  10 mL Intracatheter Q8H     sodium chloride (PF)  10 mL Irrigation Q8H     sodium chloride (PF)  3 mL Intracatheter Q8H     traZODone  100 mg Oral At Bedtime     vancomycin (VANCOCIN) IV  2,000 mg Intravenous Q24H       Data   All microbiology laboratory data reviewed.  Recent Labs   Lab Test 02/03/20  0600 02/02/20  0550 01/31/20  0812   WBC 16.1* 16.0* 17.7*   HGB 8.8* 9.8* 9.6*   HCT 30.7* 33.5* 32.4*   MCV 89 88 88    429 387     Recent Labs   Lab Test 02/03/20  0600 02/02/20  0550 02/01/20  0615   CR 0.53 0.49* 0.52     No lab results found.  Recent Labs   Lab Test 01/27/20  1035 01/27/20  1000 01/25/20  2336 01/25/20  2241 01/04/20  1455 01/02/20  1233 12/30/19  0425 12/29/19  1507 12/29/19  1435   CULT No anaerobes isolated  Moderate growth  Streptococcus mitis group  Susceptibility testing not routinely  done  *  Moderate growth  Haemophilus parainfluenzae  Susceptibility testing not routinely done  *  On day 1, isolated in broth only:  Candida glabrata complex  Susceptibility testing not routinely done  * Culture in progress  Moderate growth  Streptococcus mitis group  Susceptibility testing not routinely done  *  Moderate growth  Haemophilus parainfluenzae  Susceptibility testing not routinely done  *  Light growth  Enterococcus faecalis  *  On day 1, isolated in broth only:  Candida glabrata complex  Susceptibility testing not routinely done  *  Susceptibility testing requested by  Dr Cardenas, [phone],  would like routine sens done on C. Glabrata. 1.31.20 1007. BENITO   No growth  50,000 to 100,000 colonies/mL  Pseudomonas aeruginosa  * No growth  Heavy growth  Methicillin resistant Staphylococcus aureus (MRSA)  *  Heavy growth  Streptococcus mitis group  Susceptibility testing not routinely done  *  Heavy growth  Haemophilus parainfluenzae  Susceptibility testing not routinely done  * No growth Cultured on the 1st day of incubation:  Staphylococcus hominis  *  Critical Value/Significant Value, preliminary result only, called to and read back by  Sangeeta Taylor RN [facility] 1.3.20 @2982 AE    Susceptibility testing done on previous specimen Methicillin resistant Staphylococcus aureus (MRSA) isolated* No growth Cultured on the 1st day of incubation:  Staphylococcus hominis  *  Critical Value/Significant Value, preliminary result only, called to and read back by  Suhail Escobar RN from [facility] ICU. 12/30/19 at 1202.TV.    Cultured on the 1st day of incubation:  Staphylococcus epidermidis  *  (Note)  POSITIVE for Staphylococci other than S.aureus, S.epidermidis and  S.lugdunensis, by Ginio.comigene multiplex nucleic acid test.  Coagulase-negative staphylococci are the most common venipuncture or  collection associated skin CONTAMINANTS grown in blood cultures.  Final identification and antimicrobial  susceptibility testing will be  verified by standard methods.    Specimen tested with CardioDxigene multiplex, gram-positive blood culture  nucleic acid test for the following targets: Staph aureus, Staph  epidermidis, Staph lugdunensis, other Staph species, Enterococcus  faecalis, Enterococcus faecium, Streptococcus species, S. agalactiae,  S. anginosus grp., S. pneumoniae, S. pyogenes, Listeria sp., mecA  (methicillin resistance) and Nick/B (vancomycin resistance).    Critical Value/Significant Value called to and read back by FANNY SAMANO RN 12/30/2019 @ 3383 BC         Attestation:  Total time on the floor involved in the patient's care: 35 minutes. Total time spent in counseling/care coordination: >50%

## 2020-02-03 NOTE — PROGRESS NOTES
[facility]    Infectious Disease Progress Note    Date of Service (when I saw the patient): 2020     Assessment & Plan   Bhavani White is a 64 year old female who was admitted on 2020.     Summary  63 yo morbidly (BMI > 45 kg/m^2) obese w complex abdominal infectoin  19  admitted w duodenal perforation  X lap x 2 / GJ tube / VAC for wound closure  20   Readmitted w abdl pain / drainage around GJ-tube  percut drain into abscess  Follow-up CT  reduction in size fluid  Cultures w bacteria / yeast  On broad spectrum antimicrobial agents     Impression:  (K65.1) Intra-abdominal abscess (H)   After duodenal ulcer perforation / several surgeries  (E66.01) Elevated BMI  (B37.9) Infection due to Candida glabrata  (A49.1) Infection due to alpha-hemolytic Streptococcus  (Z86.14) History of MRSA infection  (D72.829) Leukocytosis, unspecified type       Recommendations:   Cont broad spect antibiotics and drainage and other cares  Symptom care  Monitor cultures       Daniel Guillen MD    Interval History   A little better, less abdl pain  No new issues  Tolerating antibiotics OK    Rest of 14 pt ROS neg         CURRENT ANTIBIOTICS        Physical Exam   Temp: 97.6  F (36.4  C) Temp src: Axillary BP: 116/55 Pulse: 75 Heart Rate: 74 Resp: 16 SpO2: 97 % O2 Device: Nasal cannula Oxygen Delivery: 2 LPM  Vitals:    20 0600 20 0651 20 0610   Weight: 121.8 kg (268 lb 8.3 oz) 120.7 kg (266 lb) 134.7 kg (297 lb)     Vital Signs with Ranges  Temp:  [96.6  F (35.9  C)-98.8  F (37.1  C)] 97.6  F (36.4  C)  Pulse:  [75] 75  Heart Rate:  [74-83] 74  Resp:  [16] 16  BP: (116-126)/(55-68) 116/55  SpO2:  [94 %-98 %] 97 %    Temp (48hrs), Av.1  F (36.2  C), Min:95  F (35  C), Max:98.8  F (37.1  C)     General In bed / no apparent acute troubles  Lungs  Diminished at bases as before  Psyche Calm, cooperative  Skin  No rash  abd  Mildly tender  Neuro  Normal  conversatoin    Medications     dextrose       HYDROmorphone       sodium chloride 10 mL/hr at 02/02/20 1403       amLODIPine  5 mg Oral or Feeding Tube Daily     DULoxetine  120 mg Oral QAM     furosemide  40 mg Oral Daily     gabapentin  1,600 mg Oral or Feeding Tube TID     insulin aspart  1-6 Units Subcutaneous Q4H     insulin glargine  27 Units Subcutaneous QAM AC     ipratropium - albuterol 0.5 mg/2.5 mg/3 mL  3 mL Nebulization 4x daily     levothyroxine  88 mcg Oral or Feeding Tube QAM AC     methadone  20 mg Oral or Feeding Tube BID    Or     methadone  20 mg Oral BID     micafungin  100 mg Intravenous Q24H     pantoprazole  40 mg Oral BID AC    Or     pantoprazole  40 mg Oral or Feeding Tube BID AC     piperacillin-tazobactam  3.375 g Intravenous Q6H     polyethylene glycol  17 g Oral Daily     sennosides  2 tablet Oral BID     simvastatin  20 mg Oral or Feeding Tube At Bedtime     sodium chloride (PF)  10 mL Intracatheter Q8H     sodium chloride (PF)  10 mL Irrigation Q8H     sodium chloride (PF)  3 mL Intracatheter Q8H     traZODone  100 mg Oral At Bedtime     vancomycin (VANCOCIN) IV  2,000 mg Intravenous Q24H       Data      IMAGING    1.31      CT CONCLUSION: Significant reduction in size of abdominal fluid collections, status post drainage catheter placement.    All microbiology laboratory data reviewed.  Recent Labs   Lab Test 02/02/20  0550 01/31/20  0812 01/30/20  0726   WBC 16.0* 17.7* 15.1*   HGB 9.8* 9.6* 10.4*   HCT 33.5* 32.4* 35.2   MCV 88 88 89    387 361     Recent Labs   Lab Test 02/02/20  0550 02/01/20  0615 01/31/20  0812   CR 0.49* 0.52 0.51*       Recent Labs   01/27/20  1035 01/27/20  1000 01/25/20  2336 01/25/20  2241 01/02/20  1233 12/30/19  0425 12/29/19  1507 12/29/19  1435   Moderate growth  Streptococcus mitis group  Susceptibility testing not routinely done  *  Moderate growth  Haemophilus parainfluenzae  Susceptibility testing not routinely done  *  On day 1, isolated  in broth only:  Candida glabrata complex  Susceptibility testing not routinely done  *  Culture in progress Moderate growth  Streptococcus mitis group  Susceptibility testing not routinely done  *  Moderate growth  Haemophilus parainfluenzae  Susceptibility testing not routinely done  *  Light growth  Enterococcus faecalis  *  On day 1, isolated in broth only:  Candida glabrata complex  Susceptibility testing not routinely done  *  Susceptibility testing requested by  Dr Cardenas, [phone],  would like routine sens done on C. Glabrata. 1.31.20 1007. BENITO    Culture negative monitoring continues No growth  50,000 to 100,000 colonies/mL  Pseudomonas aeruginosa  * No growth  Heavy growth  Methicillin resistant Staphylococcus aureus (MRSA)  *  Heavy growth  Streptococcus mitis group  Susceptibility testing not routinely done  *  Heavy growth  Haemophilus parainfluenzae  Susceptibility testing not routinely done  * Cultured on the 1st day of incubation:  Staphylococcus hominis  *  Critical Value/Significant Value, preliminary result only, called to and read back by  Sangeeta Taylor RN [facility] 1.3.20 @6967 AEM    Susceptibility testing done on previous specimen Methicillin resistant Staphylococcus aureus (MRSA) isolated* No growth Cultured on the 1st day of incubation:  Staphylococcus hominis  *  Critical Value/Significant Value, preliminary result only, called to and read back by  Suhail Escobar RN from [facility] ICU. 12/30/19 at 1202.TV.    Cultured on the 1st day of incubation:  Staphylococcus epidermidis  *  (Note)  POSITIVE for Staphylococci other than S.aureus, S.epidermidis and  S.lugdunensis, by Volt multiplex nucleic acid test.  Coagulase-negative staphylococci are the most common venipuncture or  collection associated skin CONTAMINANTS grown in blood cultures.  Final identification and antimicrobial susceptibility testing will be  verified by standard methods.    Specimen tested with Volt  multiplex, gram-positive blood culture  nucleic acid test for the following targets: Staph aureus, Staph  epidermidis, Staph lugdunensis, other Staph species, Enterococcus  faecalis, Enterococcus faecium, Streptococcus species, S. agalactiae,  S. anginosus grp., S. pneumoniae, S. pyogenes, Listeria sp., mecA  (methicillin resistance) and Nick/B (vancomycin resistance).    Critical Value/Significant Value called to and read back by FANNY SAMANO RN 12/30/2019 @ 4886 BC         Attestation:  TT 36 min  > 50 % CC

## 2020-02-03 NOTE — PROGRESS NOTES
Shift summary  Bhavani White with abdominal abcess.  Respiratory history: corie, asthma  Reports subjective improvement after neb.   Currently on  2 LMP, 02 sats - 96%, RR-18.   LUNGS:  Diminished t/o,    A/p    - continue nebs as ordered   - monitor and wean 02 as tolerated   Shaun Carlson, RT

## 2020-02-03 NOTE — PROGRESS NOTES
CLINICAL NUTRITION SERVICES - REASSESSMENT NOTE      Malnutrition: (1/27)  % Weight Loss:  None noted  % Intake:  No decreased intake noted (has been receiving TF)  Subcutaneous Fat Loss:  None observed  Muscle Loss:  None observed  Fluid Retention:  None noted     Malnutrition Diagnosis: Patient does not meet two of the above criteria necessary for diagnosing malnutrition          EVALUATION OF PROGRESS TOWARD GOALS   Diet:    Full liquids (for pleasure)    Nutrition Support:    Type of Feeding Tube: J-tube  Enteral Frequency:  Continuous  Enteral Regimen: Peptamen Intense VHP at 60 mL/hr (goal rate)  Total Enteral Provisions: 1440 kcal (12 kcal/kg), 132 g protein (2.5 g/kg), 1210 mL H2O, 7 g fiber, 109 g CHO, 96% RDI  Water flushes: 60 mL every 4 hrs  TF + flushes = 1570 mL free water/day     Intake/Tolerance:    Pt tolerating TF at goal rate  Taking some full liquids  (1/31) +BM  G-tube to gravity (pt had pain when clamped over the weekend)  2/3: Na 138         K 3.8  BGM: 121, 147  Meds: miralax, senokot, insulin, lasix      ASSESSED NUTRITION NEEDS:  Dosing Weight 121.8 kg (1/27 wt for energy), 56.8 kg (IBW for protein)  Estimated Energy Needs: 0846-7479 kcals (11-14 Kcal/Kg)  Justification: obese  Estimated Protein Needs: 115-145 grams protein (2-2.5 g pro/Kg)  Justification: hypercatabolism with acute illness and obesity guidelines            NEW FINDINGS:   Vitals:    01/25/20 2224 01/26/20 0502 01/27/20 0600 01/30/20 0651   Weight: 113.4 kg (250 lb) 122.2 kg (269 lb 6.4 oz) 121.8 kg (268 lb 8.3 oz) 120.7 kg (266 lb)    01/31/20 0610   Weight: 134.7 kg (297 lb)         Previous Goals (1/30):   EN to meet % est needs  Evaluation: Met    Previous Nutrition Diagnosis (1/30):   No nutrition diagnosis identified at this time   Evaluation: No change        CURRENT NUTRITION DIAGNOSIS  No nutrition diagnosis identified at this time     INTERVENTIONS  Recommendations / Nutrition Prescription  Full liquid  diet  TF as above    Implementation  No intervention at this time    Goals  EN to meet % est needs      MONITORING AND EVALUATION:  Progress towards goals will be monitored and evaluated per protocol and Practice Guidelines

## 2020-02-03 NOTE — PROGRESS NOTES
Surgery    Continues to have increased fullness and pain when she has full liquids.  No fevers.  No other complaints.    Abdomen-drains in place.  Incision pink with fibrinous exudate stable.  No significant tenderness throughout.    A/P  Not tolerating full as well.  We will go back to clears but she can also have milk per her request.  Keep tube on gravity.  Drain output is decreasing but still high enough to leave in for now.  Continue to follow.    Ayan Lopez M.D.  [facility]  [phone]

## 2020-02-04 ENCOUNTER — APPOINTMENT (OUTPATIENT)
Dept: INTERVENTIONAL RADIOLOGY/VASCULAR | Facility: CLINIC | Age: 65
DRG: 372 | End: 2020-02-04
Attending: SURGERY
Payer: MEDICARE

## 2020-02-04 LAB
CREAT SERPL-MCNC: 0.53 MG/DL (ref 0.52–1.04)
GFR SERPL CREATININE-BSD FRML MDRD: >90 ML/MIN/{1.73_M2}
GLUCOSE BLDC GLUCOMTR-MCNC: 122 MG/DL (ref 70–99)
GLUCOSE BLDC GLUCOMTR-MCNC: 180 MG/DL (ref 70–99)
GLUCOSE BLDC GLUCOMTR-MCNC: 85 MG/DL (ref 70–99)
GLUCOSE BLDC GLUCOMTR-MCNC: 90 MG/DL (ref 70–99)
GLUCOSE BLDC GLUCOMTR-MCNC: 95 MG/DL (ref 70–99)

## 2020-02-04 PROCEDURE — 27211065 ZZ H TUBE GASTRO CR10

## 2020-02-04 PROCEDURE — 99232 SBSQ HOSP IP/OBS MODERATE 35: CPT | Performed by: INTERNAL MEDICINE

## 2020-02-04 PROCEDURE — 49451 REPLACE DUOD/JEJ TUBE PERC: CPT

## 2020-02-04 PROCEDURE — 27210742 ZZH CATH CR1

## 2020-02-04 PROCEDURE — 94640 AIRWAY INHALATION TREATMENT: CPT

## 2020-02-04 PROCEDURE — 25000132 ZZH RX MED GY IP 250 OP 250 PS 637: Mod: GY

## 2020-02-04 PROCEDURE — 25000128 H RX IP 250 OP 636: Performed by: INTERNAL MEDICINE

## 2020-02-04 PROCEDURE — 94640 AIRWAY INHALATION TREATMENT: CPT | Mod: 76

## 2020-02-04 PROCEDURE — 40000275 ZZH STATISTIC RCP TIME EA 10 MIN

## 2020-02-04 PROCEDURE — C1769 GUIDE WIRE: HCPCS

## 2020-02-04 PROCEDURE — 82565 ASSAY OF CREATININE: CPT | Performed by: HOSPITALIST

## 2020-02-04 PROCEDURE — 0DHA3UZ INSERTION OF FEEDING DEVICE INTO JEJUNUM, PERCUTANEOUS APPROACH: ICD-10-PCS | Performed by: RADIOLOGY

## 2020-02-04 PROCEDURE — 25800030 ZZH RX IP 258 OP 636: Performed by: HOSPITALIST

## 2020-02-04 PROCEDURE — 25800030 ZZH RX IP 258 OP 636: Performed by: INTERNAL MEDICINE

## 2020-02-04 PROCEDURE — 25000131 ZZH RX MED GY IP 250 OP 636 PS 637: Mod: GY | Performed by: INTERNAL MEDICINE

## 2020-02-04 PROCEDURE — 12000000 ZZH R&B MED SURG/OB

## 2020-02-04 PROCEDURE — 40000239 ZZH STATISTIC VAT ROUNDS

## 2020-02-04 PROCEDURE — 25000132 ZZH RX MED GY IP 250 OP 250 PS 637: Mod: GY | Performed by: INTERNAL MEDICINE

## 2020-02-04 PROCEDURE — 25000132 ZZH RX MED GY IP 250 OP 250 PS 637: Mod: GY | Performed by: HOSPITALIST

## 2020-02-04 PROCEDURE — 25000125 ZZHC RX 250: Performed by: HOSPITALIST

## 2020-02-04 PROCEDURE — 25000128 H RX IP 250 OP 636: Performed by: HOSPITALIST

## 2020-02-04 PROCEDURE — 27210437 ZZH NUTRITION PRODUCT SEMIELEM INTERMED LITER

## 2020-02-04 PROCEDURE — 25800030 ZZH RX IP 258 OP 636: Performed by: SURGERY

## 2020-02-04 PROCEDURE — 00000146 ZZHCL STATISTIC GLUCOSE BY METER IP

## 2020-02-04 RX ORDER — LIDOCAINE HYDROCHLORIDE 10 MG/ML
INJECTION, SOLUTION INFILTRATION; PERINEURAL
Status: DISCONTINUED
Start: 2020-02-04 | End: 2020-02-04 | Stop reason: HOSPADM

## 2020-02-04 RX ADMIN — PIPERACILLIN AND TAZOBACTAM 3.38 G: 3; .375 INJECTION, POWDER, LYOPHILIZED, FOR SOLUTION INTRAVENOUS at 05:12

## 2020-02-04 RX ADMIN — POLYETHYLENE GLYCOL 3350 17 G: 17 POWDER, FOR SOLUTION ORAL at 10:48

## 2020-02-04 RX ADMIN — IPRATROPIUM BROMIDE AND ALBUTEROL SULFATE 3 ML: .5; 3 SOLUTION RESPIRATORY (INHALATION) at 12:20

## 2020-02-04 RX ADMIN — METHADONE HYDROCHLORIDE 20 MG: 10 TABLET ORAL at 22:02

## 2020-02-04 RX ADMIN — IPRATROPIUM BROMIDE AND ALBUTEROL SULFATE 3 ML: .5; 3 SOLUTION RESPIRATORY (INHALATION) at 19:05

## 2020-02-04 RX ADMIN — METHADONE HYDROCHLORIDE 20 MG: 10 TABLET ORAL at 10:49

## 2020-02-04 RX ADMIN — PIPERACILLIN AND TAZOBACTAM 3.38 G: 3; .375 INJECTION, POWDER, LYOPHILIZED, FOR SOLUTION INTRAVENOUS at 10:48

## 2020-02-04 RX ADMIN — SENNOSIDES 2 TABLET: 8.6 TABLET, FILM COATED ORAL at 10:49

## 2020-02-04 RX ADMIN — DULOXETINE HYDROCHLORIDE 120 MG: 60 CAPSULE, DELAYED RELEASE ORAL at 10:48

## 2020-02-04 RX ADMIN — POLYETHYLENE GLYCOL 3350 17 G: 17 POWDER, FOR SOLUTION ORAL at 23:04

## 2020-02-04 RX ADMIN — SIMVASTATIN 20 MG: 20 TABLET, FILM COATED ORAL at 22:03

## 2020-02-04 RX ADMIN — INSULIN GLARGINE 27 UNITS: 100 INJECTION, SOLUTION SUBCUTANEOUS at 12:54

## 2020-02-04 RX ADMIN — PANTOPRAZOLE SODIUM 40 MG: 40 TABLET, DELAYED RELEASE ORAL at 15:53

## 2020-02-04 RX ADMIN — LEVOTHYROXINE SODIUM 88 MCG: 88 TABLET ORAL at 06:30

## 2020-02-04 RX ADMIN — IPRATROPIUM BROMIDE AND ALBUTEROL SULFATE 3 ML: .5; 3 SOLUTION RESPIRATORY (INHALATION) at 15:18

## 2020-02-04 RX ADMIN — VANCOMYCIN HYDROCHLORIDE 2000 MG: 10 INJECTION, POWDER, LYOPHILIZED, FOR SOLUTION INTRAVENOUS at 15:33

## 2020-02-04 RX ADMIN — SODIUM CHLORIDE: 9 INJECTION, SOLUTION INTRAVENOUS at 17:29

## 2020-02-04 RX ADMIN — FUROSEMIDE 40 MG: 40 TABLET ORAL at 10:49

## 2020-02-04 RX ADMIN — INSULIN ASPART 1 UNITS: 100 INJECTION, SOLUTION INTRAVENOUS; SUBCUTANEOUS at 22:02

## 2020-02-04 RX ADMIN — PIPERACILLIN AND TAZOBACTAM 3.38 G: 3; .375 INJECTION, POWDER, LYOPHILIZED, FOR SOLUTION INTRAVENOUS at 17:29

## 2020-02-04 RX ADMIN — TRAZODONE HYDROCHLORIDE 100 MG: 100 TABLET ORAL at 22:03

## 2020-02-04 RX ADMIN — GABAPENTIN 1600 MG: 800 TABLET, FILM COATED ORAL at 15:53

## 2020-02-04 RX ADMIN — PIPERACILLIN AND TAZOBACTAM 3.38 G: 3; .375 INJECTION, POWDER, LYOPHILIZED, FOR SOLUTION INTRAVENOUS at 22:03

## 2020-02-04 RX ADMIN — GABAPENTIN 1600 MG: 800 TABLET, FILM COATED ORAL at 22:03

## 2020-02-04 RX ADMIN — SENNOSIDES 2 TABLET: 8.6 TABLET, FILM COATED ORAL at 22:02

## 2020-02-04 RX ADMIN — MICAFUNGIN SODIUM 100 MG: 10 INJECTION, POWDER, LYOPHILIZED, FOR SOLUTION INTRAVENOUS at 15:33

## 2020-02-04 RX ADMIN — Medication: at 12:45

## 2020-02-04 RX ADMIN — GABAPENTIN 1600 MG: 800 TABLET, FILM COATED ORAL at 10:49

## 2020-02-04 RX ADMIN — IPRATROPIUM BROMIDE AND ALBUTEROL SULFATE 3 ML: .5; 3 SOLUTION RESPIRATORY (INHALATION) at 07:32

## 2020-02-04 RX ADMIN — PANTOPRAZOLE SODIUM 40 MG: 40 TABLET, DELAYED RELEASE ORAL at 06:30

## 2020-02-04 RX ADMIN — AMLODIPINE BESYLATE 5 MG: 5 TABLET ORAL at 10:48

## 2020-02-04 ASSESSMENT — ACTIVITIES OF DAILY LIVING (ADL)
ADLS_ACUITY_SCORE: 28
ADLS_ACUITY_SCORE: 30
ADLS_ACUITY_SCORE: 28
ADLS_ACUITY_SCORE: 28
ADLS_ACUITY_SCORE: 30
ADLS_ACUITY_SCORE: 28

## 2020-02-04 NOTE — PROVIDER NOTIFICATION
MD Notification    Notified Person: MD    Notified Person Name: Dr. Chicas    Notification Date/Time: 0747, 2/4/2020    Notification Interaction: Text page    Purpose of Notification: FYI pt pulled J-tube out overnight. Tube feeding stopped. Please advise. Thanks

## 2020-02-04 NOTE — PLAN OF CARE
A&Ox4. VSS on 2L oxygen via NC, scheduled nebs. Tele: SR with BBB. C/o moderate abdominal & back pain, PCA dilaudid. Clear liquid diet for comfort. Continuous tube feeding; pt accidentally pulled J-tube out this shift - feedings stopped. R PICC, int abx. R PIV SL. 3 THAI's, suprapubic cath, malecot drain, & G tube to gravity. Coccyx PI & abdominal midline incision dressed. T/R [id number], pt refused repositioning this shift. C/o constipation, would like a suppository today.

## 2020-02-04 NOTE — IR NOTE
Interventional Radiology Intra-procedural Nursing Note    Patient Name: Bhavani White  Medical Record Number: [id number]  Today's Date: February 4, 2020    Start Time: 0945  End of procedure time: 1008  Procedure: Jejunostomy Tube Replacement  Report given to: THANG Toure station 88    Other Notes: Pt. transferred to IR table. Prepped and draped appropriately. Monitoring equipment applied. NC applied. No complaints of pain at this time. Timeout recorded. No sedation administered.    16f jejunostomy tube replaced by Dr. Munoz. Pt. Tolerated procedure well. Site dressed with gauze and border gauze. C/D/I.

## 2020-02-04 NOTE — PROGRESS NOTES
[facility]    Hospitalist Progress Note    Assessment & Plan   Bhavani White is a 64 year old female who was admitted on 1/25/2020.  Patient had a long stay at [facility] and was discharged on 21 January, reported back on 26th May due to increased abdominal abscess.    Complex anterior upper abdominal fluid collections, recent discharge on January 21, 2020 status post perforated duodenal ulcer status post exploratory laparotomy primary closure delayed on January 1, 2020: Patient with recent discharge on January 21 of 2020 for a perforated duodenal ulcer and associated hemorrhagic shock and bowel ischemia status post exploratory laparotomy with drain placement and wound VAC.  Per report, patient with GJ tube and yellow discharge leaking around the site this afternoon prior to admission.  However, staff at facility noted more discharge and decided to call EMS, patient sent to the emergency department for further evaluation and treatment.  CT on admission shows complex anterior upper abdominal fluid collections more loculated than previous, likely developing abscesses with increased volume, communication with left upper quadrant collection to the multiloculated anterior epigastric collection, increased ascites, pleural effusions, suprapubic bladder catheter and percutaneous jejunostomy, see report for further details.  - Appreciate surgery input, consulted interventional radiology, she underwent additional drain placements 1/27  - Continue IV Vancomycin and Zosyn. Micafungin added 1/30. ID following.  -Patient has MRSA in the cultures, H parainfluenza, enterococcus, strep, Candida glabrata are the other organisms which is currently been covered.  - CT A/P 1/31 showed decrease in fluid collections  - dilaudid PCA for better pain control.  Patient complains of increased pain, PCA adjusted  -Patient had diet advanced to full liquid 2/2, it seems that she is not tolerating that well, her G-tube is  to gravity now and she is going back on clear liquid diet, possibly that will help with her pain control.  -Patient had a new PICC line placed on 1/31.  Her j-tube was pulled on 2/3, it was replaced on 2/4 by IR    Acute on chronic constipation: pt says she normally only has a BM about once a week. It had been over a week as of today.      Cardiac, hyperlipidemia, hypertension: Patient maintained on statin prior to admission.  Previous echocardiogram with EF at 60 to 65%, right ventricular global function probably normal, per report.  -Continue simvastatin and amlodipine  - pt is up 16 lbs since admission. Diuresing but very carefully given Vanc/Zosyn and risk of kidney injury.  -Monitor BMP    Pseudomonas in urine: defer to ID whether this is colonizer vs pathogenic. 50-100k organisms.  --Patient is already on Zosyn     Diabetes mellitus, insulin-dependent: Patient maintained on Lantus prior to admission. Good glycemic control here.  -Continue prior to admission Lantus 27 units subcutaneous every morning  -Insulin sliding scale, medium.     Hypothyroidism: Stable.  -Continue prior to admission levothyroxine      Chronic pain, on chronic methadone, anxiety, depression: Patient maintained on methadone prior to admission.  -PTA medications were restarted including gabapentin, methadone and Cymbalta.     GERD: Stable.  -Continue PPI     Insomnia: Stable.  -Continue prior to admission trazodone    DVT Prophylaxis: sequential compression device   Code Status: Full Code     Disposition: anticipate prolonged hospitalization    Grisel Chicas MD    Interval History   Patient was in  IR in the morning, later she is very sleepy, she had pulled the G-tube accidentally out yesterday, it is been replaced by IR today, first tube feeds were stopped since then, pain well controlled.    -Data reviewed today: I reviewed all new labs and imaging results over the last 24 hours.     Physical Exam   Temp: 96.5  F (35.8  C) Temp src:  Oral BP: 122/68   Heart Rate: 74 Resp: 16 SpO2: 97 % O2 Device: Nasal cannula Oxygen Delivery: 2 LPM  Vitals:    01/27/20 0600 01/30/20 0651 01/31/20 0610   Weight: 121.8 kg (268 lb 8.3 oz) 120.7 kg (266 lb) 134.7 kg (297 lb)     Vital Signs with Ranges  Temp:  [96.5  F (35.8  C)-97.6  F (36.4  C)] 96.5  F (35.8  C)  Heart Rate:  [71-79] 74  Resp:  [16] 16  BP: (114-129)/(59-69) 122/68  SpO2:  [95 %-99 %] 97 %  I/O last 3 completed shifts:  In: 300 [NG/GT:300]  Out: 6533 [Urine:5300; Emesis/NG output:1125; Drains:108]    Constitutional: Awake, alert, cooperative, no apparent distress. Morbidly obese.  Respiratory: Basilar crackles noted  Cardiovascular: Regular rate and rhythm, normal S1 and S2, and no murmur noted  GI: G-tube noted suprapubic catheter noted, she has healing incision, has multiple THAI drains placed by IR, bowel sounds are reduced.  Skin/Integumen: No rashes, no cyanosis, 1+ edema  Neuro : moving all 4 extremities, no focal deficit noted     Medications     dextrose       HYDROmorphone       sodium chloride 20 mL/hr at 02/04/20 1054       amLODIPine  5 mg Oral or Feeding Tube Daily     DULoxetine  120 mg Oral QAM     furosemide  40 mg Oral Daily     gabapentin  1,600 mg Oral or Feeding Tube TID     insulin aspart  1-6 Units Subcutaneous Q4H     insulin glargine  27 Units Subcutaneous QAM AC     ipratropium - albuterol 0.5 mg/2.5 mg/3 mL  3 mL Nebulization 4x daily     levothyroxine  88 mcg Oral or Feeding Tube QAM AC     methadone  20 mg Oral or Feeding Tube BID    Or     methadone  20 mg Oral BID     micafungin  100 mg Intravenous Q24H     pantoprazole  40 mg Oral BID AC    Or     pantoprazole  40 mg Oral or Feeding Tube BID AC     piperacillin-tazobactam  3.375 g Intravenous Q6H     polyethylene glycol  17 g Oral Daily     sennosides  2 tablet Oral BID     simvastatin  20 mg Oral or Feeding Tube At Bedtime     sodium chloride (PF)  10 mL Intracatheter Q8H     sodium chloride (PF)  10 mL Irrigation  Q8H     sodium chloride (PF)  3 mL Intracatheter Q8H     traZODone  100 mg Oral At Bedtime     vancomycin (VANCOCIN) IV  2,000 mg Intravenous Q24H       Data   Recent Labs   Lab 02/04/20  0523 02/03/20  0600 02/02/20  0550  01/31/20  0812   WBC  --  16.1* 16.0*  --  17.7*   HGB  --  8.8* 9.8*  --  9.6*   MCV  --  89 88  --  88   PLT  --  407 429  --  387   NA  --  138 138  --  138   POTASSIUM  --  3.8 3.7  --  3.6   CHLORIDE  --  97 96  --  98   CO2  --  42* 41*  --  39*   BUN  --  19 19  --  21   CR 0.53 0.53 0.49*   < > 0.51*   ANIONGAP  --  <1* 1*  --  1*   DORY  --  9.5 9.5  --  9.5   GLC  --  147* 134*  --  143*    < > = values in this interval not displayed.     Recent Labs   Lab 02/04/20  1227 02/04/20  0842 02/04/20  0419 02/03/20  2348 02/03/20  2043  02/03/20  0600  02/02/20  0550  01/31/20  0812  01/30/20  0726   GLC  --   --   --   --   --   --  147*  --  134*  --  143*  --  143*   BGM 95 90 85 96 101*   < >  --    < >  --    < >  --    < >  --     < > = values in this interval not displayed.       Imaging:   No results found for this or any previous visit (from the past 24 hour(s)).

## 2020-02-04 NOTE — PRE-PROCEDURE
GENERAL PRE-PROCEDURE:   Procedure:  Jejunostomy tube replacement  Date/Time:  2/4/2020 9:21 AM    Written consent obtained?: Yes    Risks and benefits: Risks, benefits and alternatives were discussed    Consent given by:  Patient  Patient states understanding of procedure being performed: Yes    Patient's understanding of procedure matches consent: Yes    Procedure consent matches procedure scheduled: Yes

## 2020-02-04 NOTE — PROGRESS NOTES
Surgery    J-tube accidentally removed last night.  Thankfully replaced by IR today.  Patient sleepy after procedure.  No new complaints.    Abdomen-drains in place.  Incision pink with fibrinous exudate stable.  No significant tenderness throughout.    A/P  I appreciate IR assistance in replacing J-tube.  Other drain output still too high for removal.  Continue current care.    Ayan Lopez M.D.  [facility]  [phone]

## 2020-02-04 NOTE — PROCEDURES
[facility]    Procedure: Replacement of 16 Fr J tube  Date/Time: 2/4/2020 10:00 AM  Performed by: Tong Munoz MD  Authorized by: Tong Munoz MD     UNIVERSAL PROTOCOL   Site Marked: NA  Prior Images Obtained and Reviewed:  Yes  Required items: Required blood products, implants, devices and special equipment available    Patient identity confirmed:  Verbally with patient, arm band, provided demographic data and hospital-assigned identification number  Patient was reevaluated immediately before administering moderate or deep sedation or anesthesia  Confirmation Checklist:  Patient's identity using two indicators, relevant allergies, procedure was appropriate and matched the consent or emergent situation and correct equipment/implants were available  Time out: Immediately prior to the procedure a time out was called    Universal Protocol: the Joint Commission Universal Protocol was followed    Preparation: Patient was prepped and draped in usual sterile fashion           ANESTHESIA    Anesthesia: Local infiltration  Local Anesthetic:  Lidocaine 1% without epinephrine      SEDATION    Patient Sedated: No    Sedation Type:  Moderate (conscious) sedation  Sedation:  Fentanyl and midazolam  Vital signs: Vital signs monitored during sedation    See dictated procedure note for full details.  Findings: Replacement of 16 Fr Jejunostomy tube.  Ready for use.  16 Fr SEVERIANO Jejunostomy tube.    Specimens: none    Complications: None    Condition: Stable    PROCEDURE   Patient Tolerance:  Patient tolerated the procedure well with no immediate complications    Length of time physician/provider present for 1:1 monitoring during sedation: 0

## 2020-02-04 NOTE — PROGRESS NOTES
MD Notification    Notified Person: MD    Notified Person Name: Dr. Casanova     Notification Date/Time: 1216, 2/4/2020    Notification Interaction: Phone call    Purpose of Notification: FYI, J-tube accidentally pulled out by patient. Tube feeding stopped. Please advise.     Orders Received: Dress & cover site, review with Hospitalist in the morning.     Comments:

## 2020-02-04 NOTE — PLAN OF CARE
"VSS.  Dilaudid PCA and scheduled methadone.  Repositioned q2h until the end of the shift when patient refused repositioning, stating, \"I do not want to be turned tonight, I want to sleep on my back.\"  Writer encouraged patient to continue with repositioning every 2 hours to prevent skin breakdown and explained why it is important, patient still refused.  Multiple drains, dressings changed.  Mepilex to coccyx/back of thighs.  TF.  Discharge pending.  Will continue to monitor.  "

## 2020-02-04 NOTE — PROGRESS NOTES
"Interventional Radiology Progress Note:  Date: 2020   Patient name: Bhavani White  MRN:[id number]  :  1955    History: Bhavani White is a 64 year old woman with a history of obesity, fibromyalgia, chronic back pain on methadone and a perforated duodenal ulcer in 2019 s/p 3 surgeries to repair with last exploratory laparotomy on 2020 s/p 2 mallencott drains, gastrostomy tube for decompression, jejunostomy tube for nutrition among other treatments, discharged form the hospital on 2020 and readmitted to the hospital on 2020 with abdominal pain and fevers. CT was done which showed increase in size of a right and left upper abdomen fluid collections- possible abscesses. CT guided drain was placed in each collection 2020 with a large amount of drainage, left 400 mL and right 170 mL 2020.    Interval History: Patient brought down to IR for jejunostomy tube replacement today as hers was pulled out overnight. She states the drains continue to be a little sore, but otherwise no complaints.      Physical Exam: Deferred due to patient being seen in IR suite while being prepped for J-tube replacement. Drains being flushed by nurses without difficulty.    Vitals:  /59 (BP Location: Left arm)   Pulse 75   Temp 97.6  F (36.4  C) (Oral)   Resp 16   Ht 1.651 m (5' 5\")   Wt 134.7 kg (297 lb)   SpO2 95%   BMI 49.42 kg/m       Labs:  CBC RESULTS:   Recent Labs   Lab Test 20  0600   WBC 16.1*   RBC 3.45*   HGB 8.8*   HCT 30.7*   MCV 89   MCH 25.5*   MCHC 28.7*   RDW 15.1*         Output:  Output by Drain (mL) 20 07 - 20 1459 20 1500 - 20 2259 20 230 - 20 0659 20 07 - 20 1459 20 1500 - 20 2259 20 2300 - 20 0659 20 07 - 20 0923   Closed/Suction Drain Lateral LUQ Bulb 19 Samoan  0 0 0 0 0    Closed/Suction Drain 1 Left LUQ  30 15 15 15 25    Closed/Suction Drain 2  70 30 20 20 " 15    Open Drain Right;Superior Abdomen RUE Malecot  10 0   0       Culture R and L UQ-Moderate growth Streptococcus mitis group, Moderate growth Haemophilus parainfluenzae    Assessment: s/p RUQ and LUQ abscess drains placed on 1/27/20    Plan: Patient is improving. We discussed with Dr Lopez; output is decreasing but we will keep the drains in a little longer.    Sangeeta Alonso PA-C  Interventional Radiology  pgr [phone]

## 2020-02-04 NOTE — PROGRESS NOTES
[facility]    Infectious Disease Progress Note    Date of Service (when I saw the patient): 02/04/2020     Assessment & Plan   Bhavani White is a 64 year old female who was admitted on 1/25/2020.       Impression:  1. 64 y.o who was recently admitted to the hospital was discharged on 21 January, that was a prolonged stay for perforated duodenal ulcer, s/p ex lap, TRUONG, Malecot drain placement with perforation, and wound VAC on 12/29/2019. Repeat ex-lap, TRUONG, closure of duodenal perforation over Malecot, G-tube/J-tube placements, delayed primary closure on 1/1/2020.  2. Admitted this occasion with abdominal pain, drainage from the G tube site.   3. CT scan with two large abscess cavities.   4. S/P multiple drain placement.   5. 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   6. She is on IV vancomycin and zosyn.   7. Diabetes.      Recommendations:   Continue zosyn for the: enterococcus, strep, h parainfluenza and also since intraabdominal abscesses for possible anaerobes which might be involved but not growing.   Continue on Vancomycin for the MRSA from the cultures.   On micafungin for the candida glabrata, can be switched to oral fluc high dose when ready for discharge.   Repeat CT scan is showing improvement in the size of the collections, s/p change in the drain today       Chacho Solis MD    Interval History   Feels tiered   Sleepy   No fever     Physical Exam   Temp: 97.6  F (36.4  C) Temp src: Oral BP: 128/59   Heart Rate: 75 Resp: 16 SpO2: 95 % O2 Device: Nasal cannula Oxygen Delivery: 2 LPM  Vitals:    01/27/20 0600 01/30/20 0651 01/31/20 0610   Weight: 121.8 kg (268 lb 8.3 oz) 120.7 kg (266 lb) 134.7 kg (297 lb)     Vital Signs with Ranges  Temp:  [96.5  F (35.8  C)-97.6  F (36.4  C)] 97.6  F (36.4  C)  Heart Rate:  [71-79] 75  Resp:  [16] 16  BP: (114-129)/(59-69) 128/59  SpO2:  [95 %-99 %] 95 %    Constitutional: Awake, alert, cooperative, no  apparent distress  Lungs: Clear to auscultation bilaterally, no crackles or wheezing  Cardiovascular: Regular rate and rhythm, normal S1 and S2, and no murmur noted  Abdomen: multiple drains   Skin: No rashes, no cyanosis, no edema  Other:    Medications     dextrose       HYDROmorphone       sodium chloride 10 mL/hr at 02/02/20 1403       amLODIPine  5 mg Oral or Feeding Tube Daily     DULoxetine  120 mg Oral QAM     furosemide  40 mg Oral Daily     gabapentin  1,600 mg Oral or Feeding Tube TID     insulin aspart  1-6 Units Subcutaneous Q4H     insulin glargine  27 Units Subcutaneous QAM AC     iohexol  50 mL Per G Tube Once     ipratropium - albuterol 0.5 mg/2.5 mg/3 mL  3 mL Nebulization 4x daily     levothyroxine  88 mcg Oral or Feeding Tube QAM AC     methadone  20 mg Oral or Feeding Tube BID    Or     methadone  20 mg Oral BID     micafungin  100 mg Intravenous Q24H     pantoprazole  40 mg Oral BID AC    Or     pantoprazole  40 mg Oral or Feeding Tube BID AC     piperacillin-tazobactam  3.375 g Intravenous Q6H     polyethylene glycol  17 g Oral Daily     sennosides  2 tablet Oral BID     simvastatin  20 mg Oral or Feeding Tube At Bedtime     sodium chloride (PF)  10 mL Intracatheter Q8H     sodium chloride (PF)  10 mL Irrigation Q8H     sodium chloride (PF)  3 mL Intracatheter Q8H     traZODone  100 mg Oral At Bedtime     vancomycin (VANCOCIN) IV  2,000 mg Intravenous Q24H       Data   All microbiology laboratory data reviewed.  Recent Labs   Lab Test 02/03/20  0600 02/02/20  0550 01/31/20  0812   WBC 16.1* 16.0* 17.7*   HGB 8.8* 9.8* 9.6*   HCT 30.7* 33.5* 32.4*   MCV 89 88 88    429 387     Recent Labs   Lab Test 02/04/20  0523 02/03/20  0600 02/02/20  0550   CR 0.53 0.53 0.49*     No lab results found.  Recent Labs   Lab Test 01/27/20  1035 01/27/20  1000 01/25/20  2336 01/25/20  2241 01/04/20  1455 01/02/20  1233 12/30/19  0425 12/29/19  1507 12/29/19  1435   CULT No anaerobes isolated  Moderate  growth  Streptococcus mitis group  Susceptibility testing not routinely done  *  Moderate growth  Haemophilus parainfluenzae  Susceptibility testing not routinely done  *  On day 1, isolated in broth only:  Candida glabrata complex  Susceptibility testing not routinely done  * No anaerobes isolated  Moderate growth  Streptococcus mitis group  Susceptibility testing not routinely done  *  Moderate growth  Haemophilus parainfluenzae  Susceptibility testing not routinely done  *  Light growth  Enterococcus faecalis  *  On day 1, isolated in broth only:  Candida glabrata complex  Susceptibility testing not routinely done  *  Susceptibility testing requested by  Dr Cardenas, [phone],  would like routine sens done on C. Glabrata. 1.31.20 1007. BENITO   No growth  50,000 to 100,000 colonies/mL  Pseudomonas aeruginosa  * No growth  Heavy growth  Methicillin resistant Staphylococcus aureus (MRSA)  *  Heavy growth  Streptococcus mitis group  Susceptibility testing not routinely done  *  Heavy growth  Haemophilus parainfluenzae  Susceptibility testing not routinely done  * No growth Cultured on the 1st day of incubation:  Staphylococcus hominis  *  Critical Value/Significant Value, preliminary result only, called to and read back by  Sangeeta Taylor RN [facility] 1.3.20 @0121 AE    Susceptibility testing done on previous specimen Methicillin resistant Staphylococcus aureus (MRSA) isolated* No growth Cultured on the 1st day of incubation:  Staphylococcus hominis  *  Critical Value/Significant Value, preliminary result only, called to and read back by  Suhail Escobar RN from [facility] ICU. 12/30/19 at 1202.TV.    Cultured on the 1st day of incubation:  Staphylococcus epidermidis  *  (Note)  POSITIVE for Staphylococci other than S.aureus, S.epidermidis and  S.lugdunensis, by [employer] multiplex nucleic acid test.  Coagulase-negative staphylococci are the most common venipuncture or  collection associated skin  CONTAMINANTS grown in blood cultures.  Final identification and antimicrobial susceptibility testing will be  verified by standard methods.    Specimen tested with [employer] multiplex, gram-positive blood culture  nucleic acid test for the following targets: Staph aureus, Staph  epidermidis, Staph lugdunensis, other Staph species, Enterococcus  faecalis, Enterococcus faecium, Streptococcus species, S. agalactiae,  S. anginosus grp., S. pneumoniae, S. pyogenes, Listeria sp., mecA  (methicillin resistance) and Nick/B (vancomycin resistance).    Critical Value/Significant Value called to and read back by FANNY SAMANO RN 12/30/2019 @ 9755 BC         Attestation:  Total time on the floor involved in the patient's care: 35 minutes. Total time spent in counseling/care coordination: >50%

## 2020-02-05 LAB
ANION GAP SERPL CALCULATED.3IONS-SCNC: 1 MMOL/L (ref 3–14)
BUN SERPL-MCNC: 18 MG/DL (ref 7–30)
CALCIUM SERPL-MCNC: 10 MG/DL (ref 8.5–10.1)
CHLORIDE SERPL-SCNC: 93 MMOL/L (ref 94–109)
CO2 SERPL-SCNC: 45 MMOL/L (ref 20–32)
CREAT SERPL-MCNC: 0.5 MG/DL (ref 0.52–1.04)
ERYTHROCYTE [DISTWIDTH] IN BLOOD BY AUTOMATED COUNT: 15.2 % (ref 10–15)
GFR SERPL CREATININE-BSD FRML MDRD: >90 ML/MIN/{1.73_M2}
GLUCOSE BLDC GLUCOMTR-MCNC: 123 MG/DL (ref 70–99)
GLUCOSE BLDC GLUCOMTR-MCNC: 152 MG/DL (ref 70–99)
GLUCOSE BLDC GLUCOMTR-MCNC: 153 MG/DL (ref 70–99)
GLUCOSE BLDC GLUCOMTR-MCNC: 78 MG/DL (ref 70–99)
GLUCOSE SERPL-MCNC: 132 MG/DL (ref 70–99)
HCT VFR BLD AUTO: 30.6 % (ref 35–47)
HGB BLD-MCNC: 9 G/DL (ref 11.7–15.7)
MCH RBC QN AUTO: 25.9 PG (ref 26.5–33)
MCHC RBC AUTO-ENTMCNC: 29.4 G/DL (ref 31.5–36.5)
MCV RBC AUTO: 88 FL (ref 78–100)
PLATELET # BLD AUTO: 368 10E9/L (ref 150–450)
POTASSIUM SERPL-SCNC: 3.6 MMOL/L (ref 3.4–5.3)
RBC # BLD AUTO: 3.47 10E12/L (ref 3.8–5.2)
SODIUM SERPL-SCNC: 139 MMOL/L (ref 133–144)
WBC # BLD AUTO: 13.9 10E9/L (ref 4–11)

## 2020-02-05 PROCEDURE — 85027 COMPLETE CBC AUTOMATED: CPT | Performed by: INTERNAL MEDICINE

## 2020-02-05 PROCEDURE — 99233 SBSQ HOSP IP/OBS HIGH 50: CPT | Performed by: INTERNAL MEDICINE

## 2020-02-05 PROCEDURE — 94640 AIRWAY INHALATION TREATMENT: CPT | Mod: 76

## 2020-02-05 PROCEDURE — 25000128 H RX IP 250 OP 636: Performed by: INTERNAL MEDICINE

## 2020-02-05 PROCEDURE — 25000128 H RX IP 250 OP 636: Performed by: HOSPITALIST

## 2020-02-05 PROCEDURE — 27210437 ZZH NUTRITION PRODUCT SEMIELEM INTERMED LITER

## 2020-02-05 PROCEDURE — 25800030 ZZH RX IP 258 OP 636: Performed by: INTERNAL MEDICINE

## 2020-02-05 PROCEDURE — 25000132 ZZH RX MED GY IP 250 OP 250 PS 637: Mod: GY

## 2020-02-05 PROCEDURE — 25000132 ZZH RX MED GY IP 250 OP 250 PS 637: Mod: GY | Performed by: HOSPITALIST

## 2020-02-05 PROCEDURE — 25800030 ZZH RX IP 258 OP 636: Performed by: HOSPITALIST

## 2020-02-05 PROCEDURE — 80048 BASIC METABOLIC PNL TOTAL CA: CPT | Performed by: INTERNAL MEDICINE

## 2020-02-05 PROCEDURE — 25000132 ZZH RX MED GY IP 250 OP 250 PS 637: Mod: GY | Performed by: INTERNAL MEDICINE

## 2020-02-05 PROCEDURE — 00000146 ZZHCL STATISTIC GLUCOSE BY METER IP

## 2020-02-05 PROCEDURE — 12000000 ZZH R&B MED SURG/OB

## 2020-02-05 PROCEDURE — 25000125 ZZHC RX 250: Performed by: HOSPITALIST

## 2020-02-05 PROCEDURE — 25000131 ZZH RX MED GY IP 250 OP 636 PS 637: Mod: GY | Performed by: INTERNAL MEDICINE

## 2020-02-05 PROCEDURE — 40000239 ZZH STATISTIC VAT ROUNDS

## 2020-02-05 PROCEDURE — 40000275 ZZH STATISTIC RCP TIME EA 10 MIN

## 2020-02-05 RX ADMIN — INSULIN ASPART 1 UNITS: 100 INJECTION, SOLUTION INTRAVENOUS; SUBCUTANEOUS at 22:42

## 2020-02-05 RX ADMIN — PANTOPRAZOLE SODIUM 40 MG: 40 TABLET, DELAYED RELEASE ORAL at 15:51

## 2020-02-05 RX ADMIN — DULOXETINE HYDROCHLORIDE 120 MG: 60 CAPSULE, DELAYED RELEASE ORAL at 08:07

## 2020-02-05 RX ADMIN — PIPERACILLIN AND TAZOBACTAM 3.38 G: 3; .375 INJECTION, POWDER, LYOPHILIZED, FOR SOLUTION INTRAVENOUS at 22:51

## 2020-02-05 RX ADMIN — FUROSEMIDE 40 MG: 40 TABLET ORAL at 08:07

## 2020-02-05 RX ADMIN — IPRATROPIUM BROMIDE AND ALBUTEROL SULFATE 3 ML: .5; 3 SOLUTION RESPIRATORY (INHALATION) at 07:57

## 2020-02-05 RX ADMIN — SIMVASTATIN 20 MG: 20 TABLET, FILM COATED ORAL at 21:17

## 2020-02-05 RX ADMIN — DEXTRAN 70 AND HYPROMELLOSE 2910 1 DROP: 1; 3 SOLUTION/ DROPS OPHTHALMIC at 20:41

## 2020-02-05 RX ADMIN — GABAPENTIN 1600 MG: 800 TABLET, FILM COATED ORAL at 21:17

## 2020-02-05 RX ADMIN — PIPERACILLIN AND TAZOBACTAM 3.38 G: 3; .375 INJECTION, POWDER, LYOPHILIZED, FOR SOLUTION INTRAVENOUS at 16:58

## 2020-02-05 RX ADMIN — VANCOMYCIN HYDROCHLORIDE 2000 MG: 10 INJECTION, POWDER, LYOPHILIZED, FOR SOLUTION INTRAVENOUS at 13:28

## 2020-02-05 RX ADMIN — PIPERACILLIN AND TAZOBACTAM 3.38 G: 3; .375 INJECTION, POWDER, LYOPHILIZED, FOR SOLUTION INTRAVENOUS at 04:51

## 2020-02-05 RX ADMIN — IPRATROPIUM BROMIDE AND ALBUTEROL SULFATE 3 ML: .5; 3 SOLUTION RESPIRATORY (INHALATION) at 12:50

## 2020-02-05 RX ADMIN — IPRATROPIUM BROMIDE AND ALBUTEROL SULFATE 3 ML: .5; 3 SOLUTION RESPIRATORY (INHALATION) at 16:23

## 2020-02-05 RX ADMIN — AMLODIPINE BESYLATE 5 MG: 5 TABLET ORAL at 08:07

## 2020-02-05 RX ADMIN — TRAZODONE HYDROCHLORIDE 100 MG: 100 TABLET ORAL at 22:42

## 2020-02-05 RX ADMIN — IPRATROPIUM BROMIDE AND ALBUTEROL SULFATE 3 ML: .5; 3 SOLUTION RESPIRATORY (INHALATION) at 19:44

## 2020-02-05 RX ADMIN — INSULIN GLARGINE 27 UNITS: 100 INJECTION, SOLUTION SUBCUTANEOUS at 08:46

## 2020-02-05 RX ADMIN — MICAFUNGIN SODIUM 100 MG: 10 INJECTION, POWDER, LYOPHILIZED, FOR SOLUTION INTRAVENOUS at 12:03

## 2020-02-05 RX ADMIN — METHADONE HYDROCHLORIDE 20 MG: 10 TABLET ORAL at 20:26

## 2020-02-05 RX ADMIN — Medication: at 10:21

## 2020-02-05 RX ADMIN — ALBUTEROL SULFATE 2.5 MG: 2.5 SOLUTION RESPIRATORY (INHALATION) at 04:50

## 2020-02-05 RX ADMIN — LEVOTHYROXINE SODIUM 88 MCG: 88 TABLET ORAL at 05:41

## 2020-02-05 RX ADMIN — GABAPENTIN 1600 MG: 800 TABLET, FILM COATED ORAL at 15:50

## 2020-02-05 RX ADMIN — SENNOSIDES 2 TABLET: 8.6 TABLET, FILM COATED ORAL at 08:07

## 2020-02-05 RX ADMIN — GABAPENTIN 1600 MG: 800 TABLET, FILM COATED ORAL at 08:07

## 2020-02-05 RX ADMIN — PANTOPRAZOLE SODIUM 40 MG: 40 TABLET, DELAYED RELEASE ORAL at 05:41

## 2020-02-05 RX ADMIN — INSULIN ASPART 1 UNITS: 100 INJECTION, SOLUTION INTRAVENOUS; SUBCUTANEOUS at 17:28

## 2020-02-05 RX ADMIN — METHADONE HYDROCHLORIDE 20 MG: 10 TABLET ORAL at 08:07

## 2020-02-05 RX ADMIN — PIPERACILLIN AND TAZOBACTAM 3.38 G: 3; .375 INJECTION, POWDER, LYOPHILIZED, FOR SOLUTION INTRAVENOUS at 10:21

## 2020-02-05 ASSESSMENT — ACTIVITIES OF DAILY LIVING (ADL)
ADLS_ACUITY_SCORE: 26
ADLS_ACUITY_SCORE: 30
ADLS_ACUITY_SCORE: 28
ADLS_ACUITY_SCORE: 26

## 2020-02-05 NOTE — PLAN OF CARE
Vitals: WNL  Lungs: Clear. Weaned from 3L NC to 1.5L NC. Drops to 85-88% on room air.   CV: WNL. NSR   GI: Obese. C/o constipation but stated she wants to wait until tomorrow for suppository now due to discomfort post new Jtube. Multiple tubes and drains. TF restarted. Tolerating clear liquid diet.   Uro: Suprapubic catheter. Clear yellow urine.  Dressing changed.   CMS: WNL  Neuro: WNL  Skin: Mepilex to coccyx and posterior thighs for prevention. Abdominal wounds and drains. Intradry placed in obed area and abdominal folds due to moisture and prevent skin breakdown. Pt. Has been agreeable to repositioning today.   Psych: WNL  MSK: Bedrest. Lift.   Pain: 7-8/10 all shift to abdomen and back. PCA pump and scheduled methadone given.   Labs:Hgb 8.8, no s/x of bleeding. Monitor with daily labs.   Tests/Procedures: Jtube replaced.   Drains/Devices:   Suprapubic: dressing changed. WNL   THAI #1 - surgical dressing in place. 50 ml thick tan drainage.   THAI #2. - Dressing changed. WNL. Pink discoloration around stoma. No output.   THAI #3 - surgical dressing in place. 8mL output thick tan drainage.   Gtube: pink stoma. Scant crust. Cleansed per plan of care. Open to gravity. Clear or red liquid (pt. Eating red jello) output.   Jtube- WNL. new tube placement today. Scant blood on procedural dressing. New dressing applied.   Malecot: Scant opening of skin around tubing with serosanguinous drainage. 50mL output of thick tan drainage.   TF restarted around 13:00 at 30mL x 4 hours and advanced to 60 mL.

## 2020-02-05 NOTE — PROGRESS NOTES
"Surgery    Feeling better  Good spirits  Inquires about when drain can be removed.  Consuming clears.  + bm    Gen:  Awake, Alert, NAD, pleasant. Non toxic.  Blood pressure 121/61, pulse 84, temperature 98  F (36.7  C), temperature source Oral, resp. rate 16, height 1.651 m (5' 5\"), weight 134.7 kg (297 lb), SpO2 94 %  Resp - non labored    Abdomen - soft, non tender, morbidly obese. Drains in place. malecot suture anchored eroded through skin. Midline wound with strip packing superiorly and fibrinous exudate over 30% of wound with healthy appearing pink granulation tissue. Not much drainage based on dressing change yesterday. Sutures visible.     Drains with tan cloudy output. ~105cc combined output/24 hr  Malecot with tan, cloudy bilious output 75cc/hr  Gastrostomy with thin, clear green output. 1290cc/24hr    Wbc 13.9 (16)  hgb 9.0 (8.8)    A/P 64 year old female with complex anterior fluid collections; S/p Drainage per IR, also with UTI (pseudomonas). Perforated duodenal ulcer, s/p ext lap, TRUONG, Malecot drain placement with perforation, and wound VAC on 12/29/2019. Repeat ex-lap, TRUONG, closure of duodenal perforation over Malecot, G-tube/J-tube placements, delayed primary closure on 1/1/2020    - stable   - secure malecot to skin today.  - monitor drain output. Continue all drains  - continue wound care    Juan Boyle PA-C  Office: [phone]  Pager: [phone]    "

## 2020-02-05 NOTE — PROGRESS NOTES
[facility]    Hospitalist Progress Note    Date of Service (when I saw the patient): 02/05/2020    Assessment & Plan   Bhavani White is a pleasant 64 year old woman who was admitted on 1/25/2020 for evaluation of abdominal pain and increased drainage from G tube site.  Current problems include:     Complex anterior upper abdominal fluid collections, with recent discharge on January 21, 2020   - on 12/29/2019 had: perforated duodenal ulcer, s/p ex lap, TRUONG, Malecot drain placement with perforation, and wound VAC placement on 12/29/2019.   - on 1/1/20 had: Repeat ex-lap, TRUONG, closure of duodenal perforation over Malecot, G-tube/J-tube placements  - General Surgery following; underwent additional drain placements 1/27  - continue IV Vancomycin and Zosyn. Micafungin added 1/3 (for C. Glabrata)  - appreciate ID input  - continue dilaudid PCA for pain control.    - had trial of full liquid diet 2/2 --> did not tolerate that well; G-tube is to gravity now and she is back on clear liquids, and tolerating this so far    Lines/access:  - Patient had a new PICC line placed 1/31.  Her J-tube was pulled on 2/3, and was replaced on 2/4 by IR    Acute on chronic constipation; improved.  Had a BM today.  - continue current bowel meds     Dyslipidemia  - continue simvastatin     Hypertension; pressures stable  - continue amlodipine    Report of recent weight gain (previous notes indicate pt is up 16 lbs since admission); unclear cause, if accurate.  - attempt to have more accurate weights as able    Hypoxia, with some dyspnea with exertion.  Bilateral pleural effusions noted in recent imaging studies.  Stable over the past 1 or more weeks.  Bhavani says she was not on 02 at home, however.  - repeat CXR 2/6 and then evaluate further     Diabetes mellitus, insulin-dependent: was on Lantus prior to admission. Good glycemic control here.  - continue prior to admission Lantus 27 units Q a.m.  - Insulin sliding scale,  medium.     Hypothyroidism: Stable.  - continue prior to admission levothyroxine      Chronic pain; stable.  - continue methadone, cymbalta  - continue gabapentin    Depression with anxiety; stable.  - continue cymbalta     GERD: Stable.  - continue PPI     Insomnia: Stable.  - continue prior to admission trazodone      DVT Prophylaxis: Pneumatic Compression Devices and Ambulate every shift  Code Status: Full Code    Disposition: Expected discharge: TBD.      D. Ant Stapleton MD, [facility]     Internal Medicine Hospitalist  Text Page (7am - 6pm)    Interval History   Doing ok; had her malecot drain secured in place today (suture had eroded through skin) - has some increased abdominal wall pain, in addition to her chronic diffuse abdominal pain.  No n/v.  No f/c; has some SOB when moving and when using her wheelchair.  Cannot walk.  Occasional cough; no chest pain.  Eating some jello; appetite fair. No leg/arm Sx.  Requiring 2L 02 since ? Her last discharge.    Data reviewed today: I reviewed all new labs and imaging results over the last 24 hours.    Physical Exam   Temp: 98  F (36.7  C) Temp src: Oral BP: 121/61 Pulse: 84 Heart Rate: 79 Resp: 16 SpO2: 94 % O2 Device: Nasal cannula Oxygen Delivery: 2 LPM  Vitals:    01/27/20 0600 01/30/20 0651 01/31/20 0610   Weight: 121.8 kg (268 lb 8.3 oz) 120.7 kg (266 lb) 134.7 kg (297 lb)     Vital Signs with Ranges  Temp:  [96.5  F (35.8  C)-98.2  F (36.8  C)] 98  F (36.7  C)  Pulse:  [72-84] 84  Heart Rate:  [74-79] 79  Resp:  [16-18] 16  BP: (109-124)/(53-68) 121/61  SpO2:  [88 %-98 %] 94 %  I/O last 3 completed shifts:  In: 1950 [P.O.:720; I.V.:810; NG/GT:420]  Out: 5198 [Urine:3750; Emesis/NG output:1290; Drains:178]    Constitutional: awake, no apparent distress; lying in bed  HEENT: sclerae clear; MM's moist; n/c in nares  Respiratory: fair-poor a/e bilaterally; decreased at bases; no wheezing or rhonchi  Cardiovascular: Regular rate and rhythm, S1, S2 noted; no m/r/g  GI:  abdomen obese; multiple drains in place:  THAI's - 1 over mid-upper abdomen, 1 in LUQ; malecot Right mid-abdomen; THAI mid-abdomen, G tube upper abdomen; lower: suprapubic catheter; + bowel sounds; soft; mild diffuse tenderness, non-distended  Skin/Integumen: no rashes, no cyanosis, no jaundice; wounds/surgical incisions covered by dressings  Musculoskeletal: no edema  Neurologic: follows directions well; no focal deficits      Medications     dextrose       HYDROmorphone       sodium chloride 20 mL/hr at 02/04/20 1729       amLODIPine  5 mg Oral or Feeding Tube Daily     DULoxetine  120 mg Oral QAM     furosemide  40 mg Oral Daily     gabapentin  1,600 mg Oral or Feeding Tube TID     insulin aspart  1-6 Units Subcutaneous Q4H     insulin glargine  27 Units Subcutaneous QAM AC     ipratropium - albuterol 0.5 mg/2.5 mg/3 mL  3 mL Nebulization 4x daily     levothyroxine  88 mcg Oral or Feeding Tube QAM AC     methadone  20 mg Oral or Feeding Tube BID    Or     methadone  20 mg Oral BID     micafungin  100 mg Intravenous Q24H     pantoprazole  40 mg Oral BID AC    Or     pantoprazole  40 mg Oral or Feeding Tube BID AC     piperacillin-tazobactam  3.375 g Intravenous Q6H     polyethylene glycol  17 g Oral Daily     sennosides  2 tablet Oral BID     simvastatin  20 mg Oral or Feeding Tube At Bedtime     sodium chloride (PF)  10 mL Intracatheter Q8H     sodium chloride (PF)  10 mL Irrigation Q8H     sodium chloride (PF)  3 mL Intracatheter Q8H     traZODone  100 mg Oral At Bedtime     vancomycin (VANCOCIN) IV  2,000 mg Intravenous Q24H       Data   Recent Labs   Lab 02/05/20  0545 02/04/20  0523 02/03/20  0600 02/02/20  0550   WBC 13.9*  --  16.1* 16.0*   HGB 9.0*  --  8.8* 9.8*   MCV 88  --  89 88     --  407 429     --  138 138   POTASSIUM 3.6  --  3.8 3.7   CHLORIDE 93*  --  97 96   CO2 45*  --  42* 41*   BUN 18  --  19 19   CR 0.50* 0.53 0.53 0.49*   ANIONGAP 1*  --  <1* 1*   DORY 10.0  --  9.5 9.5   *   --  147* 134*

## 2020-02-05 NOTE — PROGRESS NOTES
Respiratory therapy following this patient to administer scheduled nebulizer treatments. Pt remains on 2L NC and keeping O2 sats above 90%.     RT to follow.

## 2020-02-05 NOTE — PROGRESS NOTES
[facility]    Infectious Disease Progress Note    Date of Service (when I saw the patient): 02/05/2020     Assessment & Plan   Bhavani White is a 64 year old female who was admitted on 1/25/2020.       Impression:  1. 64 y.o who was recently admitted to the hospital was discharged on 21 January, that was a prolonged stay for perforated duodenal ulcer, s/p ex lap, TRUONG, Malecot drain placement with perforation, and wound VAC on 12/29/2019. Repeat ex-lap, TRUONG, closure of duodenal perforation over Malecot, G-tube/J-tube placements, delayed primary closure on 1/1/2020.  2. Admitted this occasion with abdominal pain, drainage from the G tube site.   3. CT scan with two large abscess cavities.   4. S/P multiple drain placement.   5. 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   6. She is on IV vancomycin and zosyn.   7. Diabetes.      Recommendations:   Continue zosyn for the: enterococcus, strep, h parainfluenza and also since intraabdominal abscesses for possible anaerobes which might be involved but not growing.   Continue on Vancomycin for the MRSA from the cultures.   On micafungin for the candida glabrata, can be switched to oral fluc high dose when ready for discharge.   Repeat CT scan is showing improvement in the size of the collections, s/p change in the drain today       Chacho Solis MD    Interval History   Feels tiered   Sleepy   No fever     Physical Exam   Temp: 98  F (36.7  C) Temp src: Oral BP: 121/61 Pulse: 84 Heart Rate: 79 Resp: 16 SpO2: 94 % O2 Device: Nasal cannula Oxygen Delivery: 2 LPM  Vitals:    01/27/20 0600 01/30/20 0651 01/31/20 0610   Weight: 121.8 kg (268 lb 8.3 oz) 120.7 kg (266 lb) 134.7 kg (297 lb)     Vital Signs with Ranges  Temp:  [96.5  F (35.8  C)-98.2  F (36.8  C)] 98  F (36.7  C)  Pulse:  [72-84] 84  Heart Rate:  [74-79] 79  Resp:  [16-18] 16  BP: (109-124)/(53-68) 121/61  SpO2:  [88 %-98 %] 94 %    Constitutional: Awake,  alert, cooperative, no apparent distress  Lungs: Clear to auscultation bilaterally, no crackles or wheezing  Cardiovascular: Regular rate and rhythm, normal S1 and S2, and no murmur noted  Abdomen: multiple drains   Skin: No rashes, no cyanosis, no edema  Other:    Medications     dextrose       HYDROmorphone       sodium chloride 20 mL/hr at 02/04/20 1729       amLODIPine  5 mg Oral or Feeding Tube Daily     DULoxetine  120 mg Oral QAM     furosemide  40 mg Oral Daily     gabapentin  1,600 mg Oral or Feeding Tube TID     insulin aspart  1-6 Units Subcutaneous Q4H     insulin glargine  27 Units Subcutaneous QAM AC     ipratropium - albuterol 0.5 mg/2.5 mg/3 mL  3 mL Nebulization 4x daily     levothyroxine  88 mcg Oral or Feeding Tube QAM AC     methadone  20 mg Oral or Feeding Tube BID    Or     methadone  20 mg Oral BID     micafungin  100 mg Intravenous Q24H     pantoprazole  40 mg Oral BID AC    Or     pantoprazole  40 mg Oral or Feeding Tube BID AC     piperacillin-tazobactam  3.375 g Intravenous Q6H     polyethylene glycol  17 g Oral Daily     sennosides  2 tablet Oral BID     simvastatin  20 mg Oral or Feeding Tube At Bedtime     sodium chloride (PF)  10 mL Intracatheter Q8H     sodium chloride (PF)  10 mL Irrigation Q8H     sodium chloride (PF)  3 mL Intracatheter Q8H     traZODone  100 mg Oral At Bedtime     vancomycin (VANCOCIN) IV  2,000 mg Intravenous Q24H       Data   All microbiology laboratory data reviewed.  Recent Labs   Lab Test 02/05/20  0545 02/03/20  0600 02/02/20  0550   WBC 13.9* 16.1* 16.0*   HGB 9.0* 8.8* 9.8*   HCT 30.6* 30.7* 33.5*   MCV 88 89 88    407 429     Recent Labs   Lab Test 02/05/20  0545 02/04/20  0523 02/03/20  0600   CR 0.50* 0.53 0.53     No lab results found.  Recent Labs   Lab Test 01/27/20  1035 01/27/20  1000 01/25/20  2336 01/25/20  2241 01/04/20  1455 01/02/20  1233 12/30/19  0425 12/29/19  1507 12/29/19  1435   CULT No anaerobes isolated  Moderate  growth  Streptococcus mitis group  Susceptibility testing not routinely done  *  Moderate growth  Haemophilus parainfluenzae  Susceptibility testing not routinely done  *  On day 1, isolated in broth only:  Candida glabrata complex  Susceptibility testing not routinely done  * No anaerobes isolated  Moderate growth  Streptococcus mitis group  Susceptibility testing not routinely done  *  Moderate growth  Haemophilus parainfluenzae  Susceptibility testing not routinely done  *  Light growth  Enterococcus faecalis  *  On day 1, isolated in broth only:  Candida glabrata complex  Susceptibility testing not routinely done  *  Susceptibility testing requested by  Dr Cardenas, [phone],  would like routine sens done on C. Glabrata. 1.31.20 1007. BENITO   No growth  50,000 to 100,000 colonies/mL  Pseudomonas aeruginosa  * No growth  Heavy growth  Methicillin resistant Staphylococcus aureus (MRSA)  *  Heavy growth  Streptococcus mitis group  Susceptibility testing not routinely done  *  Heavy growth  Haemophilus parainfluenzae  Susceptibility testing not routinely done  * No growth Cultured on the 1st day of incubation:  Staphylococcus hominis  *  Critical Value/Significant Value, preliminary result only, called to and read back by  Sangeeta Taylor RN [facility] 1.3.20 @0744 AE    Susceptibility testing done on previous specimen Methicillin resistant Staphylococcus aureus (MRSA) isolated* No growth Cultured on the 1st day of incubation:  Staphylococcus hominis  *  Critical Value/Significant Value, preliminary result only, called to and read back by  Suhail Escobar RN from [facility] ICU. 12/30/19 at 1202.TV.    Cultured on the 1st day of incubation:  Staphylococcus epidermidis  *  (Note)  POSITIVE for Staphylococci other than S.aureus, S.epidermidis and  S.lugdunensis, by [employer] multiplex nucleic acid test.  Coagulase-negative staphylococci are the most common venipuncture or  collection associated skin  CONTAMINANTS grown in blood cultures.  Final identification and antimicrobial susceptibility testing will be  verified by standard methods.    Specimen tested with [employer] multiplex, gram-positive blood culture  nucleic acid test for the following targets: Staph aureus, Staph  epidermidis, Staph lugdunensis, other Staph species, Enterococcus  faecalis, Enterococcus faecium, Streptococcus species, S. agalactiae,  S. anginosus grp., S. pneumoniae, S. pyogenes, Listeria sp., mecA  (methicillin resistance) and Nick/B (vancomycin resistance).    Critical Value/Significant Value called to and read back by FANNY SAMANO RN 12/30/2019 @ 9266 BC         Attestation:  Total time on the floor involved in the patient's care: 35 minutes. Total time spent in counseling/care coordination: >50%

## 2020-02-05 NOTE — PROGRESS NOTES
"Interventional Radiology Progress Note:  Date: 2020   Patient name: Bhavani White  MRN:[id number]  :  1955      History: 64 year old woman with a history of obesity, fibromyalgia, chronic back pain on methadone and a perforated duodenal ulcer in 2019 status post 3 surgeries to repair with last exploratory laparotomy on 2020 s/p 2 mallencott drains, gastrostomy tube for decompression, jejunostomy tube for nutrition among other treatments, discharged form the hospital on 2020 and readmitted to the hospital on 2020 with abdominal pain and fevers. CT was done which showed increase in size of a right and left upper abdomen fluid collections possible abscesses. CT guided drain was placed in each collection 2020. 2020 CT scan confirmed significant reduction in size of abdominal fluid collections. Patient also underwent jejunostomy tube replacement in IR on 20 after tube was pulled out overnight.      Interval History: Patient states she is having some discomfort, using PCA for pain. She is feeling very tired.    Physical Exam: Patient lying in bed, very sleepy. Arouses easily to voice. Both RUQ and LUQ drains with C/D/I dressings. Both with milky tan output. Both flushed easily with 5mL saline each, fluid aspirates back easily as well.     Vitals:  /61 (BP Location: Left arm)   Pulse 84   Temp 98  F (36.7  C) (Oral)   Resp 16   Ht 1.651 m (5' 5\")   Wt 134.7 kg (297 lb)   SpO2 94%   BMI 49.42 kg/m       Labs:  CBC RESULTS:   Recent Labs   Lab Test 20  0545   WBC 13.9*   RBC 3.47*   HGB 9.0*   HCT 30.6*   MCV 88   MCH 25.9*   MCHC 29.4*   RDW 15.2*           Output:  Output by Drain (mL) 20 07 - 20 1459 20 1500 - 20 230 - 20 0659 20 0700 - 20 1459 20 1500 - 209 20 230 - 20 0659 20 0700 - 20 1225   Closed/Suction Drain 1 Left LUQ 15 15 25 25 " 45 20    Closed/Suction Drain 2 20 20 15 8 0 0    Open Drain Right;Superior Abdomen RUE Malecot   0  50 25       Culture: RUQ and LUQ fluid collections--Moderate growth Streptococcus mitis group, Moderate growth Haemophilus parainfluenzae     Assessment:   1. s/p CT guided RUQ and LUQ drain placement on 1/27/20 in the setting of multiple complicated recent abdominal surgeries  2. s/p jejunostomy tube replacement  2/4/2020 (used for nutrition, pulled out overnight)    Plan: LUQ drain output slightly increased last evening. RUQ drain output decreasing. WBC improving. Agree with surgery plan to continue drains, monitor output. No concerns s/p J-tube replacement yesterday.       Sangeeta Alonso PA-C  Interventional Radiology  pgr [phone]

## 2020-02-06 ENCOUNTER — APPOINTMENT (OUTPATIENT)
Dept: GENERAL RADIOLOGY | Facility: CLINIC | Age: 65
DRG: 372 | End: 2020-02-06
Attending: INTERNAL MEDICINE
Payer: MEDICARE

## 2020-02-06 LAB
ANION GAP SERPL CALCULATED.3IONS-SCNC: 2 MMOL/L (ref 3–14)
BUN SERPL-MCNC: 17 MG/DL (ref 7–30)
CALCIUM SERPL-MCNC: 9.8 MG/DL (ref 8.5–10.1)
CHLORIDE SERPL-SCNC: 93 MMOL/L (ref 94–109)
CO2 SERPL-SCNC: 43 MMOL/L (ref 20–32)
CREAT SERPL-MCNC: 0.48 MG/DL (ref 0.52–1.04)
GFR SERPL CREATININE-BSD FRML MDRD: >90 ML/MIN/{1.73_M2}
GLUCOSE BLDC GLUCOMTR-MCNC: 136 MG/DL (ref 70–99)
GLUCOSE BLDC GLUCOMTR-MCNC: 139 MG/DL (ref 70–99)
GLUCOSE BLDC GLUCOMTR-MCNC: 141 MG/DL (ref 70–99)
GLUCOSE BLDC GLUCOMTR-MCNC: 153 MG/DL (ref 70–99)
GLUCOSE BLDC GLUCOMTR-MCNC: 159 MG/DL (ref 70–99)
GLUCOSE SERPL-MCNC: 150 MG/DL (ref 70–99)
POTASSIUM SERPL-SCNC: 3.6 MMOL/L (ref 3.4–5.3)
SODIUM SERPL-SCNC: 138 MMOL/L (ref 133–144)

## 2020-02-06 PROCEDURE — 25800030 ZZH RX IP 258 OP 636: Performed by: INTERNAL MEDICINE

## 2020-02-06 PROCEDURE — 94640 AIRWAY INHALATION TREATMENT: CPT | Mod: 76

## 2020-02-06 PROCEDURE — 84520 ASSAY OF UREA NITROGEN: CPT | Performed by: HOSPITALIST

## 2020-02-06 PROCEDURE — 82947 ASSAY GLUCOSE BLOOD QUANT: CPT | Performed by: HOSPITALIST

## 2020-02-06 PROCEDURE — 00000146 ZZHCL STATISTIC GLUCOSE BY METER IP

## 2020-02-06 PROCEDURE — 25000132 ZZH RX MED GY IP 250 OP 250 PS 637: Mod: GY | Performed by: INTERNAL MEDICINE

## 2020-02-06 PROCEDURE — 25000128 H RX IP 250 OP 636: Performed by: INTERNAL MEDICINE

## 2020-02-06 PROCEDURE — 82565 ASSAY OF CREATININE: CPT | Performed by: HOSPITALIST

## 2020-02-06 PROCEDURE — 25000125 ZZHC RX 250: Performed by: HOSPITALIST

## 2020-02-06 PROCEDURE — 12000000 ZZH R&B MED SURG/OB

## 2020-02-06 PROCEDURE — 25800030 ZZH RX IP 258 OP 636: Performed by: HOSPITALIST

## 2020-02-06 PROCEDURE — 25000132 ZZH RX MED GY IP 250 OP 250 PS 637: Mod: GY

## 2020-02-06 PROCEDURE — 27210437 ZZH NUTRITION PRODUCT SEMIELEM INTERMED LITER

## 2020-02-06 PROCEDURE — 80051 ELECTROLYTE PANEL: CPT | Performed by: HOSPITALIST

## 2020-02-06 PROCEDURE — 40000275 ZZH STATISTIC RCP TIME EA 10 MIN

## 2020-02-06 PROCEDURE — 40000239 ZZH STATISTIC VAT ROUNDS

## 2020-02-06 PROCEDURE — 25000132 ZZH RX MED GY IP 250 OP 250 PS 637: Mod: GY | Performed by: HOSPITALIST

## 2020-02-06 PROCEDURE — 80048 BASIC METABOLIC PNL TOTAL CA: CPT | Performed by: INTERNAL MEDICINE

## 2020-02-06 PROCEDURE — 82310 ASSAY OF CALCIUM: CPT | Performed by: HOSPITALIST

## 2020-02-06 PROCEDURE — 25000131 ZZH RX MED GY IP 250 OP 636 PS 637: Mod: GY | Performed by: INTERNAL MEDICINE

## 2020-02-06 PROCEDURE — 99232 SBSQ HOSP IP/OBS MODERATE 35: CPT | Performed by: HOSPITALIST

## 2020-02-06 PROCEDURE — 25000128 H RX IP 250 OP 636: Performed by: HOSPITALIST

## 2020-02-06 PROCEDURE — 71046 X-RAY EXAM CHEST 2 VIEWS: CPT

## 2020-02-06 RX ADMIN — IPRATROPIUM BROMIDE AND ALBUTEROL SULFATE 3 ML: .5; 3 SOLUTION RESPIRATORY (INHALATION) at 11:48

## 2020-02-06 RX ADMIN — METHADONE HYDROCHLORIDE 20 MG: 10 TABLET ORAL at 10:30

## 2020-02-06 RX ADMIN — ALBUTEROL SULFATE 2.5 MG: 2.5 SOLUTION RESPIRATORY (INHALATION) at 02:18

## 2020-02-06 RX ADMIN — PIPERACILLIN AND TAZOBACTAM 3.38 G: 3; .375 INJECTION, POWDER, LYOPHILIZED, FOR SOLUTION INTRAVENOUS at 10:34

## 2020-02-06 RX ADMIN — PANTOPRAZOLE SODIUM 40 MG: 40 TABLET, DELAYED RELEASE ORAL at 05:58

## 2020-02-06 RX ADMIN — INSULIN ASPART 1 UNITS: 100 INJECTION, SOLUTION INTRAVENOUS; SUBCUTANEOUS at 18:21

## 2020-02-06 RX ADMIN — METHADONE HYDROCHLORIDE 20 MG: 10 TABLET ORAL at 21:51

## 2020-02-06 RX ADMIN — SIMVASTATIN 20 MG: 20 TABLET, FILM COATED ORAL at 21:51

## 2020-02-06 RX ADMIN — ALBUTEROL SULFATE 2.5 MG: 2.5 SOLUTION RESPIRATORY (INHALATION) at 06:54

## 2020-02-06 RX ADMIN — PIPERACILLIN AND TAZOBACTAM 3.38 G: 3; .375 INJECTION, POWDER, LYOPHILIZED, FOR SOLUTION INTRAVENOUS at 05:41

## 2020-02-06 RX ADMIN — IPRATROPIUM BROMIDE AND ALBUTEROL SULFATE 3 ML: .5; 3 SOLUTION RESPIRATORY (INHALATION) at 07:44

## 2020-02-06 RX ADMIN — MICAFUNGIN SODIUM 100 MG: 10 INJECTION, POWDER, LYOPHILIZED, FOR SOLUTION INTRAVENOUS at 12:36

## 2020-02-06 RX ADMIN — FUROSEMIDE 40 MG: 40 TABLET ORAL at 10:31

## 2020-02-06 RX ADMIN — AMLODIPINE BESYLATE 5 MG: 5 TABLET ORAL at 10:31

## 2020-02-06 RX ADMIN — VANCOMYCIN HYDROCHLORIDE 2000 MG: 10 INJECTION, POWDER, LYOPHILIZED, FOR SOLUTION INTRAVENOUS at 15:54

## 2020-02-06 RX ADMIN — GABAPENTIN 1600 MG: 800 TABLET, FILM COATED ORAL at 10:31

## 2020-02-06 RX ADMIN — INSULIN ASPART 1 UNITS: 100 INJECTION, SOLUTION INTRAVENOUS; SUBCUTANEOUS at 10:32

## 2020-02-06 RX ADMIN — INSULIN ASPART 1 UNITS: 100 INJECTION, SOLUTION INTRAVENOUS; SUBCUTANEOUS at 01:43

## 2020-02-06 RX ADMIN — PIPERACILLIN AND TAZOBACTAM 3.38 G: 3; .375 INJECTION, POWDER, LYOPHILIZED, FOR SOLUTION INTRAVENOUS at 18:06

## 2020-02-06 RX ADMIN — PANTOPRAZOLE SODIUM 40 MG: 40 TABLET, DELAYED RELEASE ORAL at 16:03

## 2020-02-06 RX ADMIN — INSULIN GLARGINE 27 UNITS: 100 INJECTION, SOLUTION SUBCUTANEOUS at 10:32

## 2020-02-06 RX ADMIN — INSULIN ASPART 1 UNITS: 100 INJECTION, SOLUTION INTRAVENOUS; SUBCUTANEOUS at 06:57

## 2020-02-06 RX ADMIN — LEVOTHYROXINE SODIUM 88 MCG: 88 TABLET ORAL at 05:58

## 2020-02-06 RX ADMIN — GABAPENTIN 1600 MG: 800 TABLET, FILM COATED ORAL at 21:51

## 2020-02-06 RX ADMIN — TRAZODONE HYDROCHLORIDE 100 MG: 100 TABLET ORAL at 21:51

## 2020-02-06 RX ADMIN — DULOXETINE HYDROCHLORIDE 120 MG: 60 CAPSULE, DELAYED RELEASE ORAL at 10:50

## 2020-02-06 RX ADMIN — Medication: at 14:49

## 2020-02-06 RX ADMIN — IPRATROPIUM BROMIDE AND ALBUTEROL SULFATE 3 ML: .5; 3 SOLUTION RESPIRATORY (INHALATION) at 19:45

## 2020-02-06 RX ADMIN — PIPERACILLIN AND TAZOBACTAM 3.38 G: 3; .375 INJECTION, POWDER, LYOPHILIZED, FOR SOLUTION INTRAVENOUS at 22:52

## 2020-02-06 RX ADMIN — IPRATROPIUM BROMIDE AND ALBUTEROL SULFATE 3 ML: .5; 3 SOLUTION RESPIRATORY (INHALATION) at 15:10

## 2020-02-06 RX ADMIN — GABAPENTIN 1600 MG: 800 TABLET, FILM COATED ORAL at 16:03

## 2020-02-06 ASSESSMENT — ACTIVITIES OF DAILY LIVING (ADL)
ADLS_ACUITY_SCORE: 24
ADLS_ACUITY_SCORE: 26
ADLS_ACUITY_SCORE: 24
ADLS_ACUITY_SCORE: 24

## 2020-02-06 NOTE — PROGRESS NOTES
SPIRITUAL HEALTH SERVICES Progress Note  [facility]    Follow-up visit with pt.  Pt was resting and roused by was quite sleepy during our conversation.  Provided brief opportunity for reflection and support and shared prayer.  Pt hopeful that she may discharge home later today.   team available if additional needs arise.                                                                                                                                                 Traci Santana M.A.  Staff   Pager [phone]  Phone [phone]

## 2020-02-06 NOTE — PROGRESS NOTES
"Surgery    Patient seen and examined.   No complaints.   Reports feeling better.   Drains stable.   Continue current cares.  Agree with student note below.    ALIZA ManuelC        General Surgery - PA-student    Patient is doing well today.     Blood pressure 120/59, pulse 84, temperature 95.7  F (35.4  C), temperature source Oral, resp. rate 16, height 1.651 m (5' 5\"), weight 134.7 kg (297 lb), SpO2 (!) 86 %    Gen: Awake, alert, NAD, pleasant. Non toxic.  Resp: Non labored.  Abdomen: soft, tender at drain sites, morbidly obese. Drains in place.     Drains with tan cloudy output ~20cc combine output/24 hr  No output from the Malecot drain.     AP 64 year old female with complex anterior fluid collections; S/p Drainage per IR, also with UTI (pseudomonas). Perforated duodenal ulcer, s/p ext lap, TRUONG, Malecot drain placement with perforation, and wound VAC on 12/29/2019. Repeat ex-lap, TRUONG, closure of duodenal perforation over Malecot, G-tube/J-tube placements, delayed primary closure on 1/1/2020    -stable  -monitor drain output. Continue all drains.  -continue wound care.     Sheeba SANCHES-S2    "

## 2020-02-06 NOTE — PLAN OF CARE
VSS on 2 L O2- patient will have Xray today due to continued oxygen needs. Pain controlled with dilaudid PCA and scheduled methadone- pain improving a little per pt report. R PICC TKO with PCA. 2 JPs with milky output. Adequate UOP from suprapubic. malecot with thick tan output. Mepilex on coccyx and thighs CDI. Interdry in skin folds. Midline wound dressing CDI. Gtube to gravity- clamp with meds. Jtube with TF infusing- BG checks q4h. No stool overnight. Plan to continue abx and xray today.

## 2020-02-06 NOTE — PROGRESS NOTES
[facility]    Hospitalist Progress Note    Date of Service (when I saw the patient): 02/06/2020    Assessment & Plan   Bhavani White is a pleasant 64 year old woman who was admitted on 1/25/2020 for evaluation of abdominal pain and increased drainage from G tube site.  Current problems include:     Complex anterior upper abdominal fluid collections, with recent discharge on January 21, 2020   - on 12/29/2019 had: perforated duodenal ulcer, s/p ex lap, TRUONG, Malecot drain placement with perforation, and wound VAC placement on 12/29/2019.   - on 1/1/20 had: Repeat ex-lap, TRUONG, closure of duodenal perforation over Malecot, G-tube/J-tube placements  - General Surgery following; underwent additional drain placements 1/27  - continue IV Vancomycin and Zosyn. Micafungin added 1/3 (for C. Glabrata)  - appreciate ID input  - continue dilaudid PCA for pain control. Made slight adjustments today  - had trial of full liquid diet 2/2 --> did not tolerate that well; G-tube is to gravity now and she is back on clear liquids, and tolerating this so far    Lines/access:  - Patient had a new PICC line placed 1/31.  Her J-tube was pulled on 2/3, and was replaced on 2/4 by IR    Acute on chronic constipation; improved.  Currently having loose stools.  - continue current bowel meds     Dyslipidemia  - continue simvastatin     Hypertension; pressures stable  - continue amlodipine    Hypoxia, with some dyspnea with exertion.  Bilateral pleural effusions noted in recent imaging studies.  Stable over the past 1 or more weeks.  Bhavani says she was not on 02 at home, however.  - CXR this morning indicated bilateral pleural effusions as previous  - continue daily lasix. Pt has noticeably lost fluid weight since a week ago.  - encourage IS     Diabetes mellitus, insulin-dependent: was on Lantus prior to admission. Good glycemic control here.  - continue prior to admission Lantus 27 units Q a.m.  - Insulin sliding scale,  medium.     Hypothyroidism: Stable.  - continue prior to admission levothyroxine      Chronic pain; stable.  - continue methadone, cymbalta  - continue gabapentin    Depression with anxiety; stable.  - continue cymbalta     GERD: Stable.  - continue PPI     Insomnia: Stable.  - continue prior to admission trazodone      DVT Prophylaxis: Pneumatic Compression Devices and Ambulate every shift  Code Status: Full Code    Disposition: Expected discharge: TBD.      Morelia Lin MD   Internal Medicine Hospitalist      Interval History   Patient c/o pain, unchanged. We discussed and made some small changes to dilaudid PCA. She is tolerating clears. No N/V. Having loose stools recently. Pt is noticeably less volume overloaded since I saw her a week ago. Much less puffy. Abdomen is also much less distended. Pt appears comfortable.    Data reviewed today: I reviewed all new labs and imaging results over the last 24 hours.    Physical Exam   Temp: 97.9  F (36.6  C) Temp src: Oral BP: 118/65 Pulse: 73 Heart Rate: 76 Resp: 15 SpO2: 96 % O2 Device: Nasal cannula Oxygen Delivery: 2 LPM  Vitals:    01/27/20 0600 01/30/20 0651 01/31/20 0610   Weight: 121.8 kg (268 lb 8.3 oz) 120.7 kg (266 lb) 134.7 kg (297 lb)     Vital Signs with Ranges  Temp:  [96.7  F (35.9  C)-99.3  F (37.4  C)] 97.9  F (36.6  C)  Pulse:  [73-86] 73  Heart Rate:  [76] 76  Resp:  [15-16] 15  BP: (118-136)/(65-70) 118/65  SpO2:  [95 %-98 %] 96 %  I/O last 3 completed shifts:  In: 240 [P.O.:240]  Out: 5020 [Urine:3900; Emesis/NG output:1000; Drains:120]    Constitutional: awake, no apparent distress; lying in bed  HEENT: sclerae clear; MM's moist; n/c in nares  Respiratory: fair-poor a/e bilaterally; decreased at bases; no wheezing or rhonchi  Cardiovascular: Regular rate and rhythm, S1, S2 noted; no m/r/g  GI: abdomen obese; multiple drains in place:  THAI's - 1 over mid-upper abdomen, 1 in LUQ; malecot Right mid-abdomen; THAI mid-abdomen, G tube upper abdomen;  lower: suprapubic catheter; + bowel sounds; soft; mild diffuse tenderness, non-distended  Skin/Integumen: no rashes, no cyanosis, no jaundice; wounds/surgical incisions covered by dressings  Musculoskeletal: no edema  Neurologic: follows directions well; no focal deficits      Medications     dextrose       HYDROmorphone       sodium chloride 20 mL/hr at 02/04/20 1729       amLODIPine  5 mg Oral or Feeding Tube Daily     DULoxetine  120 mg Oral QAM     furosemide  40 mg Oral Daily     gabapentin  1,600 mg Oral or Feeding Tube TID     insulin aspart  1-6 Units Subcutaneous Q4H     insulin glargine  27 Units Subcutaneous QAM AC     ipratropium - albuterol 0.5 mg/2.5 mg/3 mL  3 mL Nebulization 4x daily     levothyroxine  88 mcg Oral or Feeding Tube QAM AC     methadone  20 mg Oral or Feeding Tube BID    Or     methadone  20 mg Oral BID     micafungin  100 mg Intravenous Q24H     pantoprazole  40 mg Oral BID AC    Or     pantoprazole  40 mg Oral or Feeding Tube BID AC     piperacillin-tazobactam  3.375 g Intravenous Q6H     polyethylene glycol  17 g Oral Daily     sennosides  2 tablet Oral BID     simvastatin  20 mg Oral or Feeding Tube At Bedtime     sodium chloride (PF)  10 mL Intracatheter Q8H     sodium chloride (PF)  10 mL Irrigation Q8H     sodium chloride (PF)  3 mL Intracatheter Q8H     traZODone  100 mg Oral At Bedtime     vancomycin (VANCOCIN) IV  2,000 mg Intravenous Q24H       Data   Recent Labs   Lab 02/06/20  0600 02/05/20  0545 02/04/20  0523 02/03/20  0600 02/02/20  0550   WBC  --  13.9*  --  16.1* 16.0*   HGB  --  9.0*  --  8.8* 9.8*   MCV  --  88  --  89 88   PLT  --  368  --  407 429    139  --  138 138   POTASSIUM 3.6 3.6  --  3.8 3.7   CHLORIDE 93* 93*  --  97 96   CO2 43* 45*  --  42* 41*   BUN 17 18  --  19 19   CR 0.48* 0.50* 0.53 0.53 0.49*   ANIONGAP 2* 1*  --  <1* 1*   DORY 9.8 10.0  --  9.5 9.5   * 132*  --  147* 134*

## 2020-02-06 NOTE — PROGRESS NOTES
CLINICAL NUTRITION SERVICES - REASSESSMENT NOTE      Malnutrition: (1/27)  % Weight Loss:  None noted  % Intake:  No decreased intake noted (has been receiving TF)  Subcutaneous Fat Loss:  None observed  Muscle Loss:  None observed  Fluid Retention:  None noted     Malnutrition Diagnosis: Patient does not meet two of the above criteria necessary for diagnosing malnutrition           EVALUATION OF PROGRESS TOWARD GOALS   Diet:    Clear liquids (OK for milk)    Nutrition Support:    Type of Feeding Tube: J-tube  Enteral Frequency:  Continuous  Enteral Regimen: Peptamen Intense VHP at 60 mL/hr (goal rate)  Total Enteral Provisions: 1440 kcal (12 kcal/kg), 132 g protein (2.5 g/kg), 1210 mL H2O, 7 g fiber, 109 g CHO, 96% RDI  Water flushes: 60 mL every 4 hrs  TF + flushes = 1570 mL free water/day       Intake/Tolerance:    Chart reviewed  Pt tolerating TF at goal rate  Taking some clear liquids  +BM yesterday  BGM: 153, 150 (on insulin)        ASSESSED NUTRITION NEEDS:  Dosing Weight 121.8 kg (1/27 wt for energy), 56.8 kg (IBW for protein)  Estimated Energy Needs: 4588-2436 kcals (11-14 Kcal/Kg)  Justification: obese  Estimated Protein Needs: 115-145 grams protein (2-2.5 g pro/Kg)  Justification: hypercatabolism with acute illness and obesity guidelines        NEW FINDINGS:   1000 mL G-tube outpt past 24 hrs  Will have Xray today due to continued oxygen needs  Last recorded wt 1/31    Vitals:    01/25/20 2224 01/26/20 0502 01/27/20 0600 01/30/20 0651   Weight: 113.4 kg (250 lb) 122.2 kg (269 lb 6.4 oz) 121.8 kg (268 lb 8.3 oz) 120.7 kg (266 lb)    01/31/20 0610   Weight: 134.7 kg (297 lb)         Previous Goals (2/3):   EN to meet % est needs  Evaluation: Met    Previous Nutrition Diagnosis (2/3):   No nutrition diagnosis identified at this time   Evaluation: No change        CURRENT NUTRITION DIAGNOSIS  No nutrition diagnosis identified at this time     INTERVENTIONS  Recommendations / Nutrition  Prescription  Clear liquids + milk (for pleasure)  TF as above    Implementation  No intervention at this time - continue TF as above    Goals  EN to meet % est needs      MONITORING AND EVALUATION:  Progress towards goals will be monitored and evaluated per protocol and Practice Guidelines

## 2020-02-06 NOTE — PROGRESS NOTES
[facility]    Infectious Disease Progress Note    Date of Service (when I saw the patient): 02/06/2020     Assessment & Plan   Bhavani White is a 64 year old female who was admitted on 1/25/2020.       Impression:  1. 64 y.o who was recently admitted to the hospital was discharged on 21 January, that was a prolonged stay for perforated duodenal ulcer, s/p ex lap, TRUONG, Malecot drain placement with perforation, and wound VAC on 12/29/2019. Repeat ex-lap, TRUONG, closure of duodenal perforation over Malecot, G-tube/J-tube placements, delayed primary closure on 1/1/2020.  2. Admitted this occasion with abdominal pain, drainage from the G tube site.   3. CT scan with two large abscess cavities.   4. S/P multiple drain placement.   5. 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   6. She is on IV vancomycin and zosyn.   7. Diabetes.      Recommendations:   Continue zosyn for the: enterococcus, strep, h parainfluenza and also since intraabdominal abscesses for possible anaerobes which might be involved but not growing.   Continue on Vancomycin for the MRSA from the cultures.   On micafungin for the candida glabrata, can be switched to oral fluc high dose when ready for discharge.   Repeat CT scan is showing improvement in the size of the collections, s/p drain change.       Chacho Solis MD    Interval History   Feels tiered   Sleepy   No fever     Physical Exam   Temp: (P) 99  F (37.2  C) Temp src: (P) Oral BP: (P) 117/60 Pulse: 73 Heart Rate: (P) 76 Resp: (P) 16 SpO2: (P) 96 % O2 Device: (P) Nasal cannula Oxygen Delivery: (P) 2 LPM  Vitals:    01/27/20 0600 01/30/20 0651 01/31/20 0610   Weight: 121.8 kg (268 lb 8.3 oz) 120.7 kg (266 lb) 134.7 kg (297 lb)     Vital Signs with Ranges  Temp:  [96.7  F (35.9  C)-99.3  F (37.4  C)] (P) 99  F (37.2  C)  Pulse:  [73-86] 73  Heart Rate:  [76] (P) 76  Resp:  [15-16] (P) 16  BP: (118-136)/(65-70) (P) 117/60  SpO2:  [95 %-98  %] (P) 96 %    Constitutional: Awake, alert, cooperative, no apparent distress  Lungs: Clear to auscultation bilaterally, no crackles or wheezing  Cardiovascular: Regular rate and rhythm, normal S1 and S2, and no murmur noted  Abdomen: multiple drains   Skin: No rashes, no cyanosis, no edema  Other:    Medications     dextrose       HYDROmorphone       sodium chloride 20 mL/hr at 02/04/20 1729       amLODIPine  5 mg Oral or Feeding Tube Daily     DULoxetine  120 mg Oral QAM     furosemide  40 mg Oral Daily     gabapentin  1,600 mg Oral or Feeding Tube TID     insulin aspart  1-6 Units Subcutaneous Q4H     insulin glargine  27 Units Subcutaneous QAM AC     ipratropium - albuterol 0.5 mg/2.5 mg/3 mL  3 mL Nebulization 4x daily     levothyroxine  88 mcg Oral or Feeding Tube QAM AC     methadone  20 mg Oral or Feeding Tube BID    Or     methadone  20 mg Oral BID     micafungin  100 mg Intravenous Q24H     pantoprazole  40 mg Oral BID AC    Or     pantoprazole  40 mg Oral or Feeding Tube BID AC     piperacillin-tazobactam  3.375 g Intravenous Q6H     polyethylene glycol  17 g Oral Daily     sennosides  2 tablet Oral BID     simvastatin  20 mg Oral or Feeding Tube At Bedtime     sodium chloride (PF)  10 mL Intracatheter Q8H     sodium chloride (PF)  10 mL Irrigation Q8H     sodium chloride (PF)  3 mL Intracatheter Q8H     traZODone  100 mg Oral At Bedtime     vancomycin (VANCOCIN) IV  2,000 mg Intravenous Q24H       Data   All microbiology laboratory data reviewed.  Recent Labs   Lab Test 02/05/20  0545 02/03/20  0600 02/02/20  0550   WBC 13.9* 16.1* 16.0*   HGB 9.0* 8.8* 9.8*   HCT 30.6* 30.7* 33.5*   MCV 88 89 88    407 429     Recent Labs   Lab Test 02/06/20  0600 02/05/20  0545 02/04/20  0523   CR 0.48* 0.50* 0.53     No lab results found.  Recent Labs   Lab Test 01/27/20  1035 01/27/20  1000 01/25/20  2336 01/25/20  2241 01/04/20  1455 01/02/20  1233 12/30/19  0425 12/29/19  1507 12/29/19  1435   CULT No  anaerobes isolated  Moderate growth  Streptococcus mitis group  Susceptibility testing not routinely done  *  Moderate growth  Haemophilus parainfluenzae  Susceptibility testing not routinely done  *  On day 1, isolated in broth only:  Candida glabrata complex  Susceptibility testing not routinely done  * No anaerobes isolated  Moderate growth  Streptococcus mitis group  Susceptibility testing not routinely done  *  Moderate growth  Haemophilus parainfluenzae  Susceptibility testing not routinely done  *  Light growth  Enterococcus faecalis  *  On day 1, isolated in broth only:  Candida glabrata complex  Susceptibility testing not routinely done  *  Susceptibility testing requested by  Dr Cardenas, [phone],  would like routine sens done on C. Glabrata. 1.31.20 1007. BENITO   No growth  50,000 to 100,000 colonies/mL  Pseudomonas aeruginosa  * No growth  Heavy growth  Methicillin resistant Staphylococcus aureus (MRSA)  *  Heavy growth  Streptococcus mitis group  Susceptibility testing not routinely done  *  Heavy growth  Haemophilus parainfluenzae  Susceptibility testing not routinely done  * No growth Cultured on the 1st day of incubation:  Staphylococcus hominis  *  Critical Value/Significant Value, preliminary result only, called to and read back by  Sangeeta Taylor RN [facility] 1.3.20 @1759 AE    Susceptibility testing done on previous specimen Methicillin resistant Staphylococcus aureus (MRSA) isolated* No growth Cultured on the 1st day of incubation:  Staphylococcus hominis  *  Critical Value/Significant Value, preliminary result only, called to and read back by  Suhail Escobar RN from [facility] ICU. 12/30/19 at 1202.TV.    Cultured on the 1st day of incubation:  Staphylococcus epidermidis  *  (Note)  POSITIVE for Staphylococci other than S.aureus, S.epidermidis and  S.lugdunensis, by [employer] multiplex nucleic acid test.  Coagulase-negative staphylococci are the most common venipuncture  or  collection associated skin CONTAMINANTS grown in blood cultures.  Final identification and antimicrobial susceptibility testing will be  verified by standard methods.    Specimen tested with m[employer] multiplex, gram-positive blood culture  nucleic acid test for the following targets: Staph aureus, Staph  epidermidis, Staph lugdunensis, other Staph species, Enterococcus  faecalis, Enterococcus faecium, Streptococcus species, S. agalactiae,  S. anginosus grp., S. pneumoniae, S. pyogenes, Listeria sp., mecA  (methicillin resistance) and Nick/B (vancomycin resistance).    Critical Value/Significant Value called to and read back by FANNY SAMANO RN 12/30/2019 @ 0240 BC         Attestation:  Total time on the floor involved in the patient's care: 35 minutes. Total time spent in counseling/care coordination: >50%

## 2020-02-06 NOTE — PLAN OF CARE
Shift Note: A&O, up with lift, turn and reposition Q2hrs, SP cath, G tube to gravity drainage (clamped after pills), Malecot drain to duodenum with thick tan output (new stitch placed today to keep more secure), J tube for feedings, JPx2 with milkey tan output, JPs flushed Q8hrs, midline wound with packing, dressing change done today, sacral meplex x3 (coccyx and bilat thighs) changed this evening, R PICC line, dilaudid PCA and scheduled methadone, CXR ordered for tomorrow due to O2 needs, discharge pending

## 2020-02-06 NOTE — PLAN OF CARE
[id number]: Patient alert and oriented, VSS on 2 L O2. Complains of pain in drain sites and generalized pain, pain pump in use. Dose increased today per MD. Patient on bedrest, turning and repositioning in bed. Refused pillows under hips often but weight shift assistance and log rolling assistance provided. Wound care on bilateral buttocks/upper thighs and coccyx done this shift. Pt incontinent of stool, smear/small amount this shift. SP catheter with adequate urine output. Bilateral THAI with milky output. Small amount of thick output noted in malcot drain. G tube to gravity with moderate output. J tube to continuous TF. Mornign BG corrected with 1 unit of sliding scale insuline. PICC infusing continuous dilaudid PCA. Patient tolerating clear liquid diet and milk. Discharge plan pending.

## 2020-02-07 LAB
ANION GAP SERPL CALCULATED.3IONS-SCNC: <1 MMOL/L (ref 3–14)
BUN SERPL-MCNC: 19 MG/DL (ref 7–30)
CALCIUM SERPL-MCNC: 10.1 MG/DL (ref 8.5–10.1)
CHLORIDE SERPL-SCNC: 94 MMOL/L (ref 94–109)
CO2 SERPL-SCNC: 44 MMOL/L (ref 20–32)
CREAT SERPL-MCNC: 0.51 MG/DL (ref 0.52–1.04)
ERYTHROCYTE [DISTWIDTH] IN BLOOD BY AUTOMATED COUNT: 15.3 % (ref 10–15)
GFR SERPL CREATININE-BSD FRML MDRD: >90 ML/MIN/{1.73_M2}
GLUCOSE BLDC GLUCOMTR-MCNC: 116 MG/DL (ref 70–99)
GLUCOSE BLDC GLUCOMTR-MCNC: 124 MG/DL (ref 70–99)
GLUCOSE BLDC GLUCOMTR-MCNC: 128 MG/DL (ref 70–99)
GLUCOSE BLDC GLUCOMTR-MCNC: 136 MG/DL (ref 70–99)
GLUCOSE BLDC GLUCOMTR-MCNC: 151 MG/DL (ref 70–99)
GLUCOSE BLDC GLUCOMTR-MCNC: 185 MG/DL (ref 70–99)
GLUCOSE SERPL-MCNC: 128 MG/DL (ref 70–99)
HCT VFR BLD AUTO: 30.6 % (ref 35–47)
HGB BLD-MCNC: 9.2 G/DL (ref 11.7–15.7)
MAGNESIUM SERPL-MCNC: 1.7 MG/DL (ref 1.6–2.3)
MCH RBC QN AUTO: 26.6 PG (ref 26.5–33)
MCHC RBC AUTO-ENTMCNC: 30.1 G/DL (ref 31.5–36.5)
MCV RBC AUTO: 88 FL (ref 78–100)
PLATELET # BLD AUTO: 409 10E9/L (ref 150–450)
POTASSIUM SERPL-SCNC: 3.7 MMOL/L (ref 3.4–5.3)
RBC # BLD AUTO: 3.46 10E12/L (ref 3.8–5.2)
SODIUM SERPL-SCNC: 138 MMOL/L (ref 133–144)
VANCOMYCIN SERPL-MCNC: 14.2 MG/L
WBC # BLD AUTO: 14.4 10E9/L (ref 4–11)

## 2020-02-07 PROCEDURE — 25000125 ZZHC RX 250: Performed by: HOSPITALIST

## 2020-02-07 PROCEDURE — 40000239 ZZH STATISTIC VAT ROUNDS

## 2020-02-07 PROCEDURE — 25000132 ZZH RX MED GY IP 250 OP 250 PS 637: Mod: GY | Performed by: HOSPITALIST

## 2020-02-07 PROCEDURE — 25000132 ZZH RX MED GY IP 250 OP 250 PS 637: Mod: GY

## 2020-02-07 PROCEDURE — 80202 ASSAY OF VANCOMYCIN: CPT | Performed by: HOSPITALIST

## 2020-02-07 PROCEDURE — 94640 AIRWAY INHALATION TREATMENT: CPT | Mod: 76

## 2020-02-07 PROCEDURE — 25000128 H RX IP 250 OP 636: Performed by: HOSPITALIST

## 2020-02-07 PROCEDURE — 25800030 ZZH RX IP 258 OP 636: Performed by: HOSPITALIST

## 2020-02-07 PROCEDURE — 12000000 ZZH R&B MED SURG/OB

## 2020-02-07 PROCEDURE — 00000146 ZZHCL STATISTIC GLUCOSE BY METER IP

## 2020-02-07 PROCEDURE — 25000131 ZZH RX MED GY IP 250 OP 636 PS 637: Mod: GY | Performed by: INTERNAL MEDICINE

## 2020-02-07 PROCEDURE — 40000275 ZZH STATISTIC RCP TIME EA 10 MIN

## 2020-02-07 PROCEDURE — 85027 COMPLETE CBC AUTOMATED: CPT | Performed by: HOSPITALIST

## 2020-02-07 PROCEDURE — 83735 ASSAY OF MAGNESIUM: CPT | Performed by: HOSPITALIST

## 2020-02-07 PROCEDURE — 80048 BASIC METABOLIC PNL TOTAL CA: CPT | Performed by: HOSPITALIST

## 2020-02-07 PROCEDURE — 40000257 ZZH STATISTIC CONSULT NO CHARGE VASC ACCESS

## 2020-02-07 PROCEDURE — 27210437 ZZH NUTRITION PRODUCT SEMIELEM INTERMED LITER

## 2020-02-07 PROCEDURE — 25000128 H RX IP 250 OP 636: Performed by: INTERNAL MEDICINE

## 2020-02-07 PROCEDURE — 99207 ZZC CDG-MDM COMPONENT: MEETS MODERATE - UP CODED: CPT | Performed by: HOSPITALIST

## 2020-02-07 PROCEDURE — 94640 AIRWAY INHALATION TREATMENT: CPT

## 2020-02-07 PROCEDURE — 25800030 ZZH RX IP 258 OP 636: Performed by: INTERNAL MEDICINE

## 2020-02-07 PROCEDURE — 99233 SBSQ HOSP IP/OBS HIGH 50: CPT | Performed by: HOSPITALIST

## 2020-02-07 PROCEDURE — 25000132 ZZH RX MED GY IP 250 OP 250 PS 637: Mod: GY | Performed by: INTERNAL MEDICINE

## 2020-02-07 RX ORDER — METHADONE HYDROCHLORIDE 10 MG/1
10 TABLET ORAL
Status: DISCONTINUED | OUTPATIENT
Start: 2020-02-08 | End: 2020-02-07

## 2020-02-07 RX ORDER — METHADONE HYDROCHLORIDE 10 MG/1
10 TABLET ORAL
Status: DISCONTINUED | OUTPATIENT
Start: 2020-02-08 | End: 2020-02-12 | Stop reason: HOSPADM

## 2020-02-07 RX ADMIN — IPRATROPIUM BROMIDE AND ALBUTEROL SULFATE 3 ML: .5; 3 SOLUTION RESPIRATORY (INHALATION) at 15:11

## 2020-02-07 RX ADMIN — METHADONE HYDROCHLORIDE 20 MG: 10 TABLET ORAL at 08:30

## 2020-02-07 RX ADMIN — GABAPENTIN 1600 MG: 800 TABLET, FILM COATED ORAL at 08:30

## 2020-02-07 RX ADMIN — GABAPENTIN 1600 MG: 800 TABLET, FILM COATED ORAL at 16:05

## 2020-02-07 RX ADMIN — MICAFUNGIN SODIUM 100 MG: 10 INJECTION, POWDER, LYOPHILIZED, FOR SOLUTION INTRAVENOUS at 12:37

## 2020-02-07 RX ADMIN — IPRATROPIUM BROMIDE AND ALBUTEROL SULFATE 3 ML: .5; 3 SOLUTION RESPIRATORY (INHALATION) at 10:59

## 2020-02-07 RX ADMIN — SENNOSIDES 2 TABLET: 8.6 TABLET, FILM COATED ORAL at 08:29

## 2020-02-07 RX ADMIN — PIPERACILLIN AND TAZOBACTAM 3.38 G: 3; .375 INJECTION, POWDER, LYOPHILIZED, FOR SOLUTION INTRAVENOUS at 17:14

## 2020-02-07 RX ADMIN — Medication 40 MG: at 08:28

## 2020-02-07 RX ADMIN — FUROSEMIDE 40 MG: 40 TABLET ORAL at 08:30

## 2020-02-07 RX ADMIN — LEVOTHYROXINE SODIUM 88 MCG: 88 TABLET ORAL at 08:30

## 2020-02-07 RX ADMIN — IPRATROPIUM BROMIDE AND ALBUTEROL SULFATE 3 ML: .5; 3 SOLUTION RESPIRATORY (INHALATION) at 06:54

## 2020-02-07 RX ADMIN — TRAZODONE HYDROCHLORIDE 100 MG: 100 TABLET ORAL at 22:56

## 2020-02-07 RX ADMIN — ALBUTEROL SULFATE 2.5 MG: 2.5 SOLUTION RESPIRATORY (INHALATION) at 06:03

## 2020-02-07 RX ADMIN — PIPERACILLIN AND TAZOBACTAM 3.38 G: 3; .375 INJECTION, POWDER, LYOPHILIZED, FOR SOLUTION INTRAVENOUS at 11:17

## 2020-02-07 RX ADMIN — VANCOMYCIN HYDROCHLORIDE 2000 MG: 10 INJECTION, POWDER, LYOPHILIZED, FOR SOLUTION INTRAVENOUS at 13:51

## 2020-02-07 RX ADMIN — SIMVASTATIN 20 MG: 20 TABLET, FILM COATED ORAL at 22:56

## 2020-02-07 RX ADMIN — PIPERACILLIN AND TAZOBACTAM 3.38 G: 3; .375 INJECTION, POWDER, LYOPHILIZED, FOR SOLUTION INTRAVENOUS at 22:56

## 2020-02-07 RX ADMIN — METHADONE HYDROCHLORIDE 20 MG: 10 TABLET ORAL at 20:55

## 2020-02-07 RX ADMIN — INSULIN ASPART 1 UNITS: 100 INJECTION, SOLUTION INTRAVENOUS; SUBCUTANEOUS at 18:38

## 2020-02-07 RX ADMIN — DULOXETINE HYDROCHLORIDE 120 MG: 60 CAPSULE, DELAYED RELEASE ORAL at 08:29

## 2020-02-07 RX ADMIN — Medication: at 16:04

## 2020-02-07 RX ADMIN — AMLODIPINE BESYLATE 5 MG: 5 TABLET ORAL at 08:30

## 2020-02-07 RX ADMIN — IPRATROPIUM BROMIDE AND ALBUTEROL SULFATE 3 ML: .5; 3 SOLUTION RESPIRATORY (INHALATION) at 19:17

## 2020-02-07 RX ADMIN — INSULIN ASPART 1 UNITS: 100 INJECTION, SOLUTION INTRAVENOUS; SUBCUTANEOUS at 11:20

## 2020-02-07 RX ADMIN — GABAPENTIN 1600 MG: 800 TABLET, FILM COATED ORAL at 22:56

## 2020-02-07 RX ADMIN — INSULIN GLARGINE 27 UNITS: 100 INJECTION, SOLUTION SUBCUTANEOUS at 08:29

## 2020-02-07 RX ADMIN — PANTOPRAZOLE SODIUM 40 MG: 40 TABLET, DELAYED RELEASE ORAL at 16:05

## 2020-02-07 RX ADMIN — PIPERACILLIN AND TAZOBACTAM 3.38 G: 3; .375 INJECTION, POWDER, LYOPHILIZED, FOR SOLUTION INTRAVENOUS at 04:53

## 2020-02-07 ASSESSMENT — ACTIVITIES OF DAILY LIVING (ADL)
ADLS_ACUITY_SCORE: 26
ADLS_ACUITY_SCORE: 24
ADLS_ACUITY_SCORE: 26
ADLS_ACUITY_SCORE: 24
ADLS_ACUITY_SCORE: 26
ADLS_ACUITY_SCORE: 23

## 2020-02-07 ASSESSMENT — MIFFLIN-ST. JEOR: SCORE: 1911.23

## 2020-02-07 NOTE — PROGRESS NOTES
[facility]    Hospitalist Progress Note    Date of Service (when I saw the patient): 02/07/2020    Assessment & Plan   Bhavani White is a pleasant 64 year old woman who was admitted on 1/25/2020 for evaluation of abdominal pain and increased drainage from G tube site.  Current problems include:     Complex anterior upper abdominal fluid collections, with recent discharge on January 21, 2020   - on 12/29/2019 had: perforated duodenal ulcer, s/p ex lap, TRUONG, Malecot drain placement with perforation, and wound VAC placement on 12/29/2019.   - on 1/1/20 had: Repeat ex-lap, TRUONG, closure of duodenal perforation over Malecot, G-tube/J-tube placements  - General Surgery following; underwent additional drain placements 1/27  - continue IV Vancomycin and Zosyn. Micafungin added 1/3 (for C. Glabrata)  - appreciate ID input  - continue dilaudid PCA for pain control. Discussed with pharmacist today as we need to start transitioning to po.    -- tomorrow morning we will start weaning basal dose   -- re-adding PTA afternoon dose of methadone starting tomorrow   -- plan to have patient off PCA by end of weekend.  - had trial of full liquid diet 2/2 --> did not tolerate that well; G-tube is to gravity now and she is back on clear liquids, and tolerating this so far    Lines/access:  - Patient had a new PICC line placed 1/31.  Her J-tube was pulled on 2/3, and was replaced on 2/4 by IR    Acute on chronic constipation; improved.  Currently having loose stools.  - continue current bowel meds     Dyslipidemia  - continue simvastatin     Hypertension; pressures stable  - continue amlodipine    Hypoxia, with some dyspnea with exertion.  Bilateral pleural effusions noted in recent imaging studies.  Stable over the past 1 or more weeks.  Bhavani says she was not on 02 at home, however.  - CXR this morning indicated bilateral pleural effusions as previous  - continue daily lasix. Pt has noticeably lost fluid weight since a  week ago.  - encourage IS     Diabetes mellitus, insulin-dependent: was on Lantus prior to admission. Good glycemic control here.  - continue prior to admission Lantus 27 units Q a.m.  - Insulin sliding scale, medium.     Hypothyroidism: Stable.  - continue prior to admission levothyroxine      Chronic pain; stable.  - continue methadone, cymbalta  - continue gabapentin    Depression with anxiety; stable.  - continue cymbalta     GERD: Stable.  - continue PPI     Insomnia: Stable.  - continue prior to admission trazodone      DVT Prophylaxis: Pneumatic Compression Devices and Ambulate every shift  Code Status: Full Code    Disposition: Expected discharge: maybe by early next week.      Morelia Lin MD   Internal Medicine Hospitalist      Interval History   Patient c/o improved pain control. She says pain is now 6/10. As she says this she is having trouble staying awake. Tolerating current diet. Continues to have drainage from abdominal tubes. Sleeping better.    Data reviewed today: I reviewed all new labs and imaging results over the last 24 hours.    Physical Exam   Temp: 99.3  F (37.4  C) Temp src: Oral BP: 132/67 Pulse: 76 Heart Rate: 78 Resp: 18 SpO2: (!) 88 % O2 Device: Nasal cannula Oxygen Delivery: 1 LPM  Vitals:    01/30/20 0651 01/31/20 0610 02/07/20 0654   Weight: 120.7 kg (266 lb) 134.7 kg (297 lb) 135.5 kg (298 lb 12.8 oz)     Vital Signs with Ranges  Temp:  [95.7  F (35.4  C)-99.3  F (37.4  C)] 99.3  F (37.4  C)  Pulse:  [76-84] 76  Heart Rate:  [78] 78  Resp:  [16-18] 18  BP: (120-141)/(59-67) 132/67  SpO2:  [86 %-96 %] 88 %  I/O last 3 completed shifts:  In: 30 [NG/GT:30]  Out: 6300 [Urine:5375; Emesis/NG output:875; Drains:50]    Constitutional: awake, no apparent distress; lying in bed  HEENT: sclerae clear; MM's moist; n/c in nares  Respiratory: fair-poor a/e bilaterally; decreased at bases; no wheezing or rhonchi  Cardiovascular: Regular rate and rhythm, S1, S2 noted; no m/r/g  GI: abdomen  obese; multiple drains in place:  THAI's - 1 over mid-upper abdomen, 1 in LUQ; malecot Right mid-abdomen; THAI mid-abdomen, G tube upper abdomen; lower: suprapubic catheter; + bowel sounds; soft; mild diffuse tenderness, non-distended  Skin/Integumen: no rashes, no cyanosis, no jaundice; wounds/surgical incisions covered by dressings  Musculoskeletal: no edema  Neurologic: follows directions well; no focal deficits      Medications     dextrose       HYDROmorphone       sodium chloride 20 mL/hr at 02/04/20 1729       amLODIPine  5 mg Oral or Feeding Tube Daily     DULoxetine  120 mg Oral QAM     furosemide  40 mg Oral Daily     gabapentin  1,600 mg Oral or Feeding Tube TID     insulin aspart  1-6 Units Subcutaneous Q4H     insulin glargine  27 Units Subcutaneous QAM AC     ipratropium - albuterol 0.5 mg/2.5 mg/3 mL  3 mL Nebulization 4x daily     levothyroxine  88 mcg Oral or Feeding Tube QAM AC     methadone  20 mg Oral or Feeding Tube BID    Or     methadone  20 mg Oral BID     micafungin  100 mg Intravenous Q24H     pantoprazole  40 mg Oral BID AC    Or     pantoprazole  40 mg Oral or Feeding Tube BID AC     piperacillin-tazobactam  3.375 g Intravenous Q6H     polyethylene glycol  17 g Oral Daily     sennosides  2 tablet Oral BID     simvastatin  20 mg Oral or Feeding Tube At Bedtime     sodium chloride (PF)  10 mL Intracatheter Q8H     sodium chloride (PF)  10 mL Irrigation Q8H     sodium chloride (PF)  3 mL Intracatheter Q8H     traZODone  100 mg Oral At Bedtime     vancomycin (VANCOCIN) IV  2,000 mg Intravenous Q24H       Data   Recent Labs   Lab 02/07/20  0610 02/06/20  0600 02/05/20  0545  02/03/20  0600   WBC 14.4*  --  13.9*  --  16.1*   HGB 9.2*  --  9.0*  --  8.8*   MCV 88  --  88  --  89     --  368  --  407    138 139  --  138   POTASSIUM 3.7 3.6 3.6  --  3.8   CHLORIDE 94 93* 93*  --  97   CO2 44* 43* 45*  --  42*   BUN 19 17 18  --  19   CR 0.51* 0.48* 0.50*   < > 0.53   ANIONGAP <1* 2* 1*   --  <1*   DORY 10.1 9.8 10.0  --  9.5   * 150* 132*  --  147*    < > = values in this interval not displayed.

## 2020-02-07 NOTE — PROGRESS NOTES
"IR Note  Patient Name: Bhavani White  Patient MRN: [id number]  Patient : 1955    S: 64 year old female with complex anterior fluid collections; S/p Drainage per IR, also with UTI (pseudomonas). Perforated duodenal ulcer, s/p ext lap, TRUONG, Malecot drain placement with perforation, and wound VAC on 2019. Repeat ex-lap, TRUONG, closure of duodenal perforation over Malecot, G-tube/J-tube placements, delayed primary closure on 2020. Malecot drain removed on .    Pt sitting up in her bed today doing well.  No fevers or chills.  Minimal pain with drain.  Nursing has been flushing.    Vital signs:  Temp: 96.9  F (36.1  C) Temp src: Oral BP: 133/66 Pulse: 84 Heart Rate: 78 Resp: 18 SpO2: 90 % O2 Device: None (Room air) Oxygen Delivery: 1 LPM Height: 165.1 cm (5' 5\") Weight: 135.5 kg (298 lb 12.8 oz)  Estimated body mass index is 49.72 kg/m  as calculated from the following:    Height as of this encounter: 1.651 m (5' 5\").    Weight as of this encounter: 135.5 kg (298 lb 12.8 oz).    GA: WD obese female sitting up in the bed.  Drains:  IR placed drains and dressing looks CDI.  Brownish fluid coming out from both drains.      Output by Drain (mL) 20 07 - 20 1459 20 1500 - 20 2259 20 2300 - 20 0659 20 0700 - 20 1459 20 1500 - 20 2259 20 2300 - 20 0659 20 07 - 20 1143   Closed/Suction Drain 1 Left LUQ 30 30 10 15 15     Closed/Suction Drain 2 20  30 5 15     Open Drain Right;Superior Abdomen RUE Malecot   0 0 0         WBC   Date Value Ref Range Status   2020 14.4 (H) 4.0 - 11.0 10e9/L Final       A/P  -Multiple abdominal abscesses with subsequent drain placements  -Continue to monitor drain output. Brown creamy tinged fluid seen.  If WBC doesn't drop, consider repeat CT Abd Pelvis (possible sinogram) to evaluate for drain position and if there are other fluid collections that need to be addressed.  -Will review " surgical progress note.  -ID actively following as well.    MYRON Pascual--C

## 2020-02-07 NOTE — PROGRESS NOTES
Surgery    Looks better today. Pain better. Drains same output. No fevers    Abd- Soft. Drains stable. Wound stable.    A/P  Drains slowly decreasing. Agree with repeat CT on Monday if drain outputs still high. OK to try fulls again. Would work on getting on oral pain medications and off IV. Hopefully drains will come out in next few days and she could transition to TCU soon.    Ayan Lopez M.D.  [facility]  [phone]

## 2020-02-07 NOTE — PLAN OF CARE
A&Ox4.  VSS on 1L NC.  7/10 pain controlled with PCA/scheduled methadone. Up with mechanical lift; T&R q2h. Several drains; 2 JPs (tan/milky output), G-tube to gravity (Pink in color due to eating red jello yesterday), Malecot drain (thick tan output), continuous J-tube feedings (60 mL/hr with 60 mL water flush Q 4hrs), midline abdominal incision with packing covered (CDI), bilateral posterior thigh + coccyx Mepliex in place, R PICC infusing Dilaudid PCA, NS TKO; intermittent antibiotics.  All dressings changed this shift. Mepilex CDI. BG checks Q 4hrs.  Tolerating clear liquid diet.  Suprapubic catheter/incontinent of bowel.   Discharge plan to TCU pending progress.

## 2020-02-07 NOTE — PLAN OF CARE
A&Ox4.  VSS, 1L NC.  Up with mechanical lift; Q2 turns/repositioning, however the patient refused this throughout the night shift.  Patient has several drains; 2 JPs (tan/milky output), G-tube to gravity (red in color due to eating red jello yesterday, does not appear to be bloody), Malecot drain (thick tan output), continuous J-tube feedings (60 mL/hr with 60 mL water flush Q 4hrs), midline abdominal incision with packing covered (CDI), bilateral posterior thigh + coccyx Mepliex in place, R PICC infusing Dilaudid PCA, NS TKO; intermittent antibiotics.  BG checks Q 4hrs.  Tolerating clear liquid diet without problems.  Suprapubic catheter/incontinent of bowel (no BMs this shift).  Discharge pending progress.

## 2020-02-07 NOTE — PLAN OF CARE
VSS.  PCA, continuous pulse ox on.  Weaned from 2L NC to 1L NC.  Dressings changed per POC.  Repositioned q2h, patient refused repositioning at bedtime.  Incontinent of stool x1.  JPs patent, milky output.  SP cath patent.  Gtube to gravity.  Jtube continuous tube feeding.  Malacot thick drainage.  Monitoring blood sugars.  Will continue to monitor.

## 2020-02-07 NOTE — PROGRESS NOTES
[facility]    Infectious Disease Progress Note    Date of Service (when I saw the patient): 02/07/2020     Assessment & Plan   Bhavani White is a 64 year old female who was admitted on 1/25/2020.       Impression:  1. 64 y.o who was recently admitted to the hospital was discharged on 21 January, that was a prolonged stay for perforated duodenal ulcer, s/p ex lap, TRUONG, Malecot drain placement with perforation, and wound VAC on 12/29/2019. Repeat ex-lap, TRUONG, closure of duodenal perforation over Malecot, G-tube/J-tube placements, delayed primary closure on 1/1/2020.  2. Admitted this occasion with abdominal pain, drainage from the G tube site.   3. CT scan with two large abscess cavities.   4. S/P multiple drain placement.   5. 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   6. She is on IV vancomycin and zosyn.   7. Diabetes.      Recommendations:   Continue zosyn for the: enterococcus, strep, h parainfluenza and also since intraabdominal abscesses for possible anaerobes which might be involved but not growing.   Continue on Vancomycin for the MRSA from the cultures.   On micafungin for the candida glabrata, can be switched to oral fluc high dose when ready for discharge.   Repeat CT scan is showing improvement in the size of the collections, s/p drain change.       Chacho Solis MD    Interval History   Feels tiered   Sleepy   No fever     Physical Exam   Temp: 96.9  F (36.1  C) Temp src: Oral BP: 133/66 Pulse: 84 Heart Rate: 78 Resp: 18 SpO2: 90 % O2 Device: None (Room air) Oxygen Delivery: 1 LPM  Vitals:    01/30/20 0651 01/31/20 0610 02/07/20 0654   Weight: 120.7 kg (266 lb) 134.7 kg (297 lb) 135.5 kg (298 lb 12.8 oz)     Vital Signs with Ranges  Temp:  [95.7  F (35.4  C)-99  F (37.2  C)] 96.9  F (36.1  C)  Pulse:  [84] 84  Heart Rate:  [76-78] 78  Resp:  [16-18] 18  BP: (117-141)/(59-66) 133/66  SpO2:  [86 %-96 %] 90 %    Constitutional: Awake, alert,  cooperative, no apparent distress  Lungs: Clear to auscultation bilaterally, no crackles or wheezing  Cardiovascular: Regular rate and rhythm, normal S1 and S2, and no murmur noted  Abdomen: multiple drains   Skin: No rashes, no cyanosis, no edema  Other:    Medications     dextrose       HYDROmorphone       sodium chloride 20 mL/hr at 02/04/20 1729       amLODIPine  5 mg Oral or Feeding Tube Daily     DULoxetine  120 mg Oral QAM     furosemide  40 mg Oral Daily     gabapentin  1,600 mg Oral or Feeding Tube TID     insulin aspart  1-6 Units Subcutaneous Q4H     insulin glargine  27 Units Subcutaneous QAM AC     ipratropium - albuterol 0.5 mg/2.5 mg/3 mL  3 mL Nebulization 4x daily     levothyroxine  88 mcg Oral or Feeding Tube QAM AC     methadone  20 mg Oral or Feeding Tube BID    Or     methadone  20 mg Oral BID     micafungin  100 mg Intravenous Q24H     pantoprazole  40 mg Oral BID AC    Or     pantoprazole  40 mg Oral or Feeding Tube BID AC     piperacillin-tazobactam  3.375 g Intravenous Q6H     polyethylene glycol  17 g Oral Daily     sennosides  2 tablet Oral BID     simvastatin  20 mg Oral or Feeding Tube At Bedtime     sodium chloride (PF)  10 mL Intracatheter Q8H     sodium chloride (PF)  10 mL Irrigation Q8H     sodium chloride (PF)  3 mL Intracatheter Q8H     traZODone  100 mg Oral At Bedtime     vancomycin (VANCOCIN) IV  2,000 mg Intravenous Q24H       Data   All microbiology laboratory data reviewed.  Recent Labs   Lab Test 02/07/20  0610 02/05/20  0545 02/03/20  0600   WBC 14.4* 13.9* 16.1*   HGB 9.2* 9.0* 8.8*   HCT 30.6* 30.6* 30.7*   MCV 88 88 89    368 407     Recent Labs   Lab Test 02/07/20  0610 02/06/20  0600 02/05/20  0545   CR 0.51* 0.48* 0.50*     No lab results found.  Recent Labs   Lab Test 01/27/20  1035 01/27/20  1000 01/25/20  2336 01/25/20  2241 01/04/20  1455 01/02/20  1233 12/30/19  0425 12/29/19  1507 12/29/19  1435   CULT No anaerobes isolated  Moderate  growth  Streptococcus mitis group  Susceptibility testing not routinely done  *  Moderate growth  Haemophilus parainfluenzae  Susceptibility testing not routinely done  *  On day 1, isolated in broth only:  Candida glabrata complex  Susceptibility testing not routinely done  * No anaerobes isolated  Moderate growth  Streptococcus mitis group  Susceptibility testing not routinely done  *  Moderate growth  Haemophilus parainfluenzae  Susceptibility testing not routinely done  *  Light growth  Enterococcus faecalis  *  On day 1, isolated in broth only:  Candida glabrata complex  Susceptibility testing not routinely done  *  Susceptibility testing requested by  Dr Cardenas, [phone],  would like routine sens done on C. Glabrata. 1.31.20 1007. BENITO   No growth  50,000 to 100,000 colonies/mL  Pseudomonas aeruginosa  * No growth  Heavy growth  Methicillin resistant Staphylococcus aureus (MRSA)  *  Heavy growth  Streptococcus mitis group  Susceptibility testing not routinely done  *  Heavy growth  Haemophilus parainfluenzae  Susceptibility testing not routinely done  * No growth Cultured on the 1st day of incubation:  Staphylococcus hominis  *  Critical Value/Significant Value, preliminary result only, called to and read back by  Sangeeta Taylor RN [facility] 1.3.20 @4140 AE    Susceptibility testing done on previous specimen Methicillin resistant Staphylococcus aureus (MRSA) isolated* No growth Cultured on the 1st day of incubation:  Staphylococcus hominis  *  Critical Value/Significant Value, preliminary result only, called to and read back by  Suhail Escobar RN from [facility] ICU. 12/30/19 at 1202.TV.    Cultured on the 1st day of incubation:  Staphylococcus epidermidis  *  (Note)  POSITIVE for Staphylococci other than S.aureus, S.epidermidis and  S.lugdunensis, by [employer] multiplex nucleic acid test.  Coagulase-negative staphylococci are the most common venipuncture or  collection associated skin  CONTAMINANTS grown in blood cultures.  Final identification and antimicrobial susceptibility testing will be  verified by standard methods.    Specimen tested with [employer] multiplex, gram-positive blood culture  nucleic acid test for the following targets: Staph aureus, Staph  epidermidis, Staph lugdunensis, other Staph species, Enterococcus  faecalis, Enterococcus faecium, Streptococcus species, S. agalactiae,  S. anginosus grp., S. pneumoniae, S. pyogenes, Listeria sp., mecA  (methicillin resistance) and Nick/B (vancomycin resistance).    Critical Value/Significant Value called to and read back by FANNY SAMANO RN 12/30/2019 @ 8517 BC         Attestation:  Total time on the floor involved in the patient's care: 35 minutes. Total time spent in counseling/care coordination: >50%

## 2020-02-07 NOTE — PROGRESS NOTES
Pt. In room air, sat low 90's. BBS diminished. Neb treatment given as scheduled. Will continue to follow.

## 2020-02-07 NOTE — PHARMACY-VANCOMYCIN DOSING SERVICE
Pharmacy Vancomycin Note  Date of Service 2020  Patient's  1955   64 year old, female    Indication: Intra-abdominal infection  Goal Trough Level: 10-15 mg/L  Day of Therapy: 13  Current Vancomycin regimen:  2000 mg IV q24h    Current estimated CrCl = Estimated Creatinine Clearance: 157.6 mL/min (A) (based on SCr of 0.51 mg/dL (L)).    Creatinine for last 3 days  2020:  5:45 AM Creatinine 0.50 mg/dL  2020:  6:00 AM Creatinine 0.48 mg/dL  2020:  6:10 AM Creatinine 0.51 mg/dL    Recent Vancomycin Levels (past 3 days)  2020: 12:27 PM Vancomycin Level 14.2 mg/L    Vancomycin IV Administrations (past 72 hours)                   vancomycin (VANCOCIN) 2,000 mg in sodium chloride 0.9 % 500 mL intermittent infusion (mg) 2,000 mg New Bag 20 1554     2,000 mg New Bag 20 1328     2,000 mg New Bag 20 1533                Nephrotoxins and other renal medications (From now, onward)    Start     Dose/Rate Route Frequency Ordered Stop    20 1200  vancomycin (VANCOCIN) 2,000 mg in sodium chloride 0.9 % 500 mL intermittent infusion      2,000 mg  over 2 Hours Intravenous EVERY 24 HOURS 20 0159      20 0900  furosemide (LASIX) tablet 40 mg      40 mg Oral DAILY 20 1413      20 1000  piperacillin-tazobactam (ZOSYN) 3.375 g vial to attach to  mL bag     Note to Pharmacy:  Pharmacy can adjust dose based on renal function.    3.375 g  over 1 Hours Intravenous EVERY 6 HOURS 20 0957               Contrast Orders - past 72 hours (72h ago, onward)    None          Interpretation of levels and current regimen:  Trough level is  Therapeutic    Has serum creatinine changed > 50% in last 72 hours: No    Urine output:  unable to determine    Renal Function: Stable    Plan:  1.  Continue Current Dose  2.  Pharmacy will check trough levels as appropriate in 1-3 Days.    3. Serum creatinine levels will be ordered daily for the first week of therapy and at  least twice weekly for subsequent weeks.      Myla Gonsalves RPH        .

## 2020-02-08 LAB
CREAT SERPL-MCNC: 0.5 MG/DL (ref 0.52–1.04)
GFR SERPL CREATININE-BSD FRML MDRD: >90 ML/MIN/{1.73_M2}
GLUCOSE BLDC GLUCOMTR-MCNC: 117 MG/DL (ref 70–99)
GLUCOSE BLDC GLUCOMTR-MCNC: 123 MG/DL (ref 70–99)
GLUCOSE BLDC GLUCOMTR-MCNC: 137 MG/DL (ref 70–99)
GLUCOSE BLDC GLUCOMTR-MCNC: 140 MG/DL (ref 70–99)
GLUCOSE BLDC GLUCOMTR-MCNC: 143 MG/DL (ref 70–99)
GLUCOSE BLDC GLUCOMTR-MCNC: 158 MG/DL (ref 70–99)

## 2020-02-08 PROCEDURE — 40000239 ZZH STATISTIC VAT ROUNDS

## 2020-02-08 PROCEDURE — 12000000 ZZH R&B MED SURG/OB

## 2020-02-08 PROCEDURE — 25000128 H RX IP 250 OP 636: Performed by: INTERNAL MEDICINE

## 2020-02-08 PROCEDURE — 25000128 H RX IP 250 OP 636: Performed by: HOSPITALIST

## 2020-02-08 PROCEDURE — 25000132 ZZH RX MED GY IP 250 OP 250 PS 637: Mod: GY | Performed by: INTERNAL MEDICINE

## 2020-02-08 PROCEDURE — 99233 SBSQ HOSP IP/OBS HIGH 50: CPT | Performed by: HOSPITALIST

## 2020-02-08 PROCEDURE — 94640 AIRWAY INHALATION TREATMENT: CPT | Mod: 76

## 2020-02-08 PROCEDURE — 25000131 ZZH RX MED GY IP 250 OP 636 PS 637: Mod: GY | Performed by: INTERNAL MEDICINE

## 2020-02-08 PROCEDURE — 27210437 ZZH NUTRITION PRODUCT SEMIELEM INTERMED LITER

## 2020-02-08 PROCEDURE — 25800030 ZZH RX IP 258 OP 636: Performed by: HOSPITALIST

## 2020-02-08 PROCEDURE — 25800030 ZZH RX IP 258 OP 636: Performed by: SURGERY

## 2020-02-08 PROCEDURE — 40000275 ZZH STATISTIC RCP TIME EA 10 MIN

## 2020-02-08 PROCEDURE — 25000125 ZZHC RX 250: Performed by: HOSPITALIST

## 2020-02-08 PROCEDURE — 25000132 ZZH RX MED GY IP 250 OP 250 PS 637: Mod: GY

## 2020-02-08 PROCEDURE — 25000132 ZZH RX MED GY IP 250 OP 250 PS 637: Mod: GY | Performed by: HOSPITALIST

## 2020-02-08 PROCEDURE — 99207 ZZC CDG-MDM COMPONENT: MEETS MODERATE - UP CODED: CPT | Performed by: HOSPITALIST

## 2020-02-08 PROCEDURE — 25800030 ZZH RX IP 258 OP 636: Performed by: INTERNAL MEDICINE

## 2020-02-08 PROCEDURE — 00000146 ZZHCL STATISTIC GLUCOSE BY METER IP

## 2020-02-08 PROCEDURE — 82565 ASSAY OF CREATININE: CPT | Performed by: HOSPITALIST

## 2020-02-08 RX ADMIN — METHADONE HYDROCHLORIDE 20 MG: 10 TABLET ORAL at 21:25

## 2020-02-08 RX ADMIN — IPRATROPIUM BROMIDE AND ALBUTEROL SULFATE 3 ML: .5; 3 SOLUTION RESPIRATORY (INHALATION) at 10:55

## 2020-02-08 RX ADMIN — PIPERACILLIN AND TAZOBACTAM 3.38 G: 3; .375 INJECTION, POWDER, LYOPHILIZED, FOR SOLUTION INTRAVENOUS at 05:11

## 2020-02-08 RX ADMIN — TRAZODONE HYDROCHLORIDE 100 MG: 100 TABLET ORAL at 21:25

## 2020-02-08 RX ADMIN — SENNOSIDES 2 TABLET: 8.6 TABLET, FILM COATED ORAL at 09:21

## 2020-02-08 RX ADMIN — INSULIN ASPART 1 UNITS: 100 INJECTION, SOLUTION INTRAVENOUS; SUBCUTANEOUS at 13:56

## 2020-02-08 RX ADMIN — PANTOPRAZOLE SODIUM 40 MG: 40 TABLET, DELAYED RELEASE ORAL at 09:19

## 2020-02-08 RX ADMIN — PANTOPRAZOLE SODIUM 40 MG: 40 TABLET, DELAYED RELEASE ORAL at 16:39

## 2020-02-08 RX ADMIN — MICONAZOLE NITRATE: 20 POWDER TOPICAL at 12:12

## 2020-02-08 RX ADMIN — VANCOMYCIN HYDROCHLORIDE 2000 MG: 10 INJECTION, POWDER, LYOPHILIZED, FOR SOLUTION INTRAVENOUS at 13:52

## 2020-02-08 RX ADMIN — SIMVASTATIN 20 MG: 20 TABLET, FILM COATED ORAL at 21:25

## 2020-02-08 RX ADMIN — DULOXETINE HYDROCHLORIDE 120 MG: 60 CAPSULE, DELAYED RELEASE ORAL at 09:19

## 2020-02-08 RX ADMIN — AMLODIPINE BESYLATE 5 MG: 5 TABLET ORAL at 09:19

## 2020-02-08 RX ADMIN — PIPERACILLIN AND TAZOBACTAM 3.38 G: 3; .375 INJECTION, POWDER, LYOPHILIZED, FOR SOLUTION INTRAVENOUS at 16:40

## 2020-02-08 RX ADMIN — IPRATROPIUM BROMIDE AND ALBUTEROL SULFATE 3 ML: .5; 3 SOLUTION RESPIRATORY (INHALATION) at 19:33

## 2020-02-08 RX ADMIN — GABAPENTIN 1600 MG: 800 TABLET, FILM COATED ORAL at 16:39

## 2020-02-08 RX ADMIN — LEVOTHYROXINE SODIUM 88 MCG: 88 TABLET ORAL at 09:19

## 2020-02-08 RX ADMIN — IPRATROPIUM BROMIDE AND ALBUTEROL SULFATE 3 ML: .5; 3 SOLUTION RESPIRATORY (INHALATION) at 16:02

## 2020-02-08 RX ADMIN — FUROSEMIDE 40 MG: 40 TABLET ORAL at 09:19

## 2020-02-08 RX ADMIN — PIPERACILLIN AND TAZOBACTAM 3.38 G: 3; .375 INJECTION, POWDER, LYOPHILIZED, FOR SOLUTION INTRAVENOUS at 10:55

## 2020-02-08 RX ADMIN — METHADONE HYDROCHLORIDE 10 MG: 10 TABLET ORAL at 13:19

## 2020-02-08 RX ADMIN — GABAPENTIN 1600 MG: 800 TABLET, FILM COATED ORAL at 09:19

## 2020-02-08 RX ADMIN — GABAPENTIN 1600 MG: 800 TABLET, FILM COATED ORAL at 21:25

## 2020-02-08 RX ADMIN — INSULIN GLARGINE 27 UNITS: 100 INJECTION, SOLUTION SUBCUTANEOUS at 09:22

## 2020-02-08 RX ADMIN — Medication: at 16:30

## 2020-02-08 RX ADMIN — IPRATROPIUM BROMIDE AND ALBUTEROL SULFATE 3 ML: .5; 3 SOLUTION RESPIRATORY (INHALATION) at 06:12

## 2020-02-08 RX ADMIN — SODIUM CHLORIDE: 9 INJECTION, SOLUTION INTRAVENOUS at 10:17

## 2020-02-08 RX ADMIN — SENNOSIDES 2 TABLET: 8.6 TABLET, FILM COATED ORAL at 21:25

## 2020-02-08 RX ADMIN — MICAFUNGIN SODIUM 100 MG: 10 INJECTION, POWDER, LYOPHILIZED, FOR SOLUTION INTRAVENOUS at 12:12

## 2020-02-08 RX ADMIN — METHADONE HYDROCHLORIDE 20 MG: 10 TABLET ORAL at 09:18

## 2020-02-08 ASSESSMENT — ACTIVITIES OF DAILY LIVING (ADL)
ADLS_ACUITY_SCORE: 27
ADLS_ACUITY_SCORE: 23
ADLS_ACUITY_SCORE: 27
ADLS_ACUITY_SCORE: 27

## 2020-02-08 NOTE — PLAN OF CARE
Pt. A/Ox4. VSS on 1L NC. C/o 6/10 pain, 5 is tolerable, managed with PCA pump and scheduled methadone. Clear liquid diet, tolerating well. Up with lift, T/R q2h. Drains: dressings CDI. THAI x2: milky output. Malecot: thick tan output. JT: continuous TF @ GR 60mL/hr with flushes and q4 BG checks. SP cath: yellow output. Gtube OTG: pink output from jello. Midline incision CDI. Open blanchable redness on bilat thighs, mepilex in place CDI. Small smear BM this shift. R PICC infusing dilaudid PCA. Plan to decrease the dose in the morning. PICC dressing needs to be changed 2/8. Continue to monitor drains and output, pain control.

## 2020-02-08 NOTE — PLAN OF CARE
A&Ox4.  VSS, 1L NC.  Dilaudid PCA/scheduled methadone for pain management; adequate relief.  Tolerating clear liquid diet without problems.  Refused turns/repositioning throughout the night; reeducated on the importance of this, continued to decline.  All drains covered (drsgs changed on dayshift yesterday); CDI except for the dressing around G-tube, small amount of drainage, dressing changed.  JPx2 with milky drainage, flushed as ordered; J-tube with continuous tube feed 60 mL/hr with 60 mL water flush Q4hrs; G-tube open to gravity; Malecot, thick tan output; suprapubic catheter, adequate output noted; midline incision covered (CDI).  Blanchable redness to posterior thighs and coccyx (Mepilex in place).  R PICC infusing (gray port) Dilaudid PCA/NS TKO/intermittent antibiotics; white port will flush, but no blood return noted; red port flushes and has good blood return.  Discharge pending progress.

## 2020-02-08 NOTE — PROGRESS NOTES
[facility]    Hospitalist Progress Note    Date of Service (when I saw the patient): 02/08/2020    Assessment & Plan   Bhavani White is a pleasant 64 year old woman who was admitted on 1/25/2020 for evaluation of abdominal pain and increased drainage from G tube site.  Current problems include:     Complex anterior upper abdominal fluid collections, with recent discharge on January 21, 2020   - on 12/29/2019 had: perforated duodenal ulcer, s/p ex lap, TRUONG, Malecot drain placement with perforation, and wound VAC placement on 12/29/2019.   - on 1/1/20 had: Repeat ex-lap, TRUONG, closure of duodenal perforation over Malecot, G-tube/J-tube placements  - General Surgery following; underwent additional drain placements 1/27  - had trial of full liquid diet 2/2 --> did not tolerate that well; G-tube is to gravity now and she is back on clear liquids, and tolerating this so far  - continue IV Vancomycin and Zosyn. Micafungin added 1/3 (for C. Glabrata)  - appreciate ID input  - transitioning pain control from dilaudid PCA to orals.   -- weaning basal PCA dose today   -- re-adding PTA afternoon dose of methadone today   -- will transition to prn oxycodone tomorrow and discontinue PCA by the end of the day.   -- plan to have patient off PCA by end of weekend.    Lines/access:  - Patient had a new PICC line placed 1/31.  Her J-tube was pulled on 2/3, and was replaced on 2/4 by IR    Acute on chronic constipation; improved.  Currently having loose stools.  - continue current bowel meds     Dyslipidemia  - continue simvastatin     Hypertension; pressures stable  - continue amlodipine    Hypoxia, with some dyspnea with exertion.  Bilateral pleural effusions noted in recent imaging studies.  Stable over the past 1 or more weeks.  Bhavani says she was not on 02 at home, however.  - CXR 2/5 indicated bilateral pleural effusions as previous  - continue daily lasix. Pt has noticeably lost fluid weight since a week ago.  -  "encourage IS     Diabetes mellitus, insulin-dependent: was on Lantus prior to admission. Good glycemic control here.  - continue prior to admission Lantus 27 units Q a.m.  - Insulin sliding scale, medium.     Hypothyroidism: Stable.  - continue prior to admission levothyroxine      Chronic pain; stable.  - continue methadone, cymbalta  - continue gabapentin    Depression with anxiety; stable.  - continue cymbalta     GERD: Stable.  - continue PPI     Insomnia: Stable.  - continue prior to admission trazodone      DVT Prophylaxis: Pneumatic Compression Devices and Ambulate every shift  Code Status: Full Code    Disposition: Expected discharge: maybe by early next week.      Morelia Lin MD   Internal Medicine Hospitalist      Interval History   Patient more alert today but says pain is tolerable, similar to yesterday despite weaning of PCA continuous dose. She is having looser stools with some \"leaking\" and declined her miralax this morning. Given the severity of her constipation earlier in admission we discussed that she could decline bowel meds here and there but I wasn't going to change anything for now. Tolerating current diet.    Data reviewed today: I reviewed all new labs and imaging results over the last 24 hours.    Physical Exam   Temp: 98.6  F (37  C) Temp src: Oral BP: 110/65 Pulse: 76 Heart Rate: 77 Resp: 16 SpO2: 95 % O2 Device: Nasal cannula Oxygen Delivery: 2 LPM  Vitals:    01/30/20 0651 01/31/20 0610 02/07/20 0654   Weight: 120.7 kg (266 lb) 134.7 kg (297 lb) 135.5 kg (298 lb 12.8 oz)     Vital Signs with Ranges  Temp:  [98.2  F (36.8  C)-99.3  F (37.4  C)] 98.6  F (37  C)  Pulse:  [76] 76  Heart Rate:  [74-78] 77  Resp:  [16-18] 16  BP: (110-132)/(61-67) 110/65  SpO2:  [88 %-97 %] 95 %  I/O last 3 completed shifts:  In: 480 [P.O.:480]  Out: 5450 [Urine:5050; Emesis/NG output:200; Drains:200]    Constitutional: awake, no apparent distress; lying in bed  HEENT: sclerae clear; MM's moist; n/c in " nares  Respiratory: fair-poor a/e bilaterally; decreased at bases; no wheezing or rhonchi  Cardiovascular: Regular rate and rhythm, S1, S2 noted; no m/r/g  GI: abdomen obese; multiple drains in place:  THAI's - 1 over mid-upper abdomen, 1 in LUQ; malecot Right mid-abdomen; THAI mid-abdomen, G tube upper abdomen; lower: suprapubic catheter; + bowel sounds; soft; mild diffuse tenderness, non-distended  Skin/Integumen: no rashes, no cyanosis, no jaundice; wounds/surgical incisions covered by dressings  Musculoskeletal: no edema  Neurologic: follows directions well; no focal deficits      Medications     dextrose       HYDROmorphone       sodium chloride 20 mL/hr at 02/08/20 1017       amLODIPine  5 mg Oral or Feeding Tube Daily     DULoxetine  120 mg Oral QAM     furosemide  40 mg Oral Daily     gabapentin  1,600 mg Oral or Feeding Tube TID     insulin aspart  1-6 Units Subcutaneous Q4H     insulin glargine  27 Units Subcutaneous QAM AC     ipratropium - albuterol 0.5 mg/2.5 mg/3 mL  3 mL Nebulization 4x daily     levothyroxine  88 mcg Oral or Feeding Tube QAM AC     methadone  10 mg Oral Daily at 4 pm     methadone  20 mg Oral or Feeding Tube BID    Or     methadone  20 mg Oral BID     micafungin  100 mg Intravenous Q24H     pantoprazole  40 mg Oral BID AC    Or     pantoprazole  40 mg Oral or Feeding Tube BID AC     piperacillin-tazobactam  3.375 g Intravenous Q6H     polyethylene glycol  17 g Oral Daily     sennosides  2 tablet Oral BID     simvastatin  20 mg Oral or Feeding Tube At Bedtime     sodium chloride (PF)  10 mL Intracatheter Q8H     sodium chloride (PF)  10 mL Irrigation Q8H     sodium chloride (PF)  3 mL Intracatheter Q8H     traZODone  100 mg Oral At Bedtime     vancomycin (VANCOCIN) IV  2,000 mg Intravenous Q24H       Data   Recent Labs   Lab 02/08/20  0600 02/07/20  0610 02/06/20  0600 02/05/20  0545  02/03/20  0600   WBC  --  14.4*  --  13.9*  --  16.1*   HGB  --  9.2*  --  9.0*  --  8.8*   MCV  --  88   --  88  --  89   PLT  --  409  --  368  --  407   NA  --  138 138 139  --  138   POTASSIUM  --  3.7 3.6 3.6  --  3.8   CHLORIDE  --  94 93* 93*  --  97   CO2  --  44* 43* 45*  --  42*   BUN  --  19 17 18  --  19   CR 0.50* 0.51* 0.48* 0.50*   < > 0.53   ANIONGAP  --  <1* 2* 1*  --  <1*   DORY  --  10.1 9.8 10.0  --  9.5   GLC  --  128* 150* 132*  --  147*    < > = values in this interval not displayed.

## 2020-02-08 NOTE — PROVIDER NOTIFICATION
MD Notification    Person notified:hosp    Person Name:Dr. Lin    Date/Time:2/8/20  0710    Interaction:phone    Purpose of Notification:Patient complaining of burning on labia, applied antifungal powder, ineffective, please advise.    Orders Received:Apply Vaseline or barrier cream after obed cares.    Comments:

## 2020-02-08 NOTE — PROGRESS NOTES
[facility]    Infectious Disease Progress Note    Date of Service (when I saw the patient): 02/08/2020     Assessment & Plan   Bhavani White is a 64 year old female who was admitted on 1/25/2020.       Impression:  1. 64 y.o who was recently admitted to the hospital was discharged on 21 January, that was a prolonged stay for perforated duodenal ulcer, s/p ex lap, TRUONG, Malecot drain placement with perforation, and wound VAC on 12/29/2019. Repeat ex-lap, TRUONG, closure of duodenal perforation over Malecot, G-tube/J-tube placements, delayed primary closure on 1/1/2020.  2. Admitted this occasion with abdominal pain, drainage from the G tube site.   3. CT scan with two large abscess cavities.   4. S/P multiple drain placement.   5. 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   6. She is on IV vancomycin and zosyn.   7. Diabetes.      Recommendations:   Continue zosyn for the: enterococcus, strep, h parainfluenza and also since intraabdominal abscesses for possible anaerobes which might be involved but not growing.   Continue on Vancomycin for the MRSA from the cultures.   On micafungin for the candida glabrata, can be switched to oral fluc high dose when ready for discharge.   Repeat CT scan is showing improvement in the size of the collections, s/p drain change.       Chacho Solis MD    Interval History   Feels tiered   Sleepy   No fever     Physical Exam   Temp: 98.6  F (37  C) Temp src: Oral BP: 110/65 Pulse: 76 Heart Rate: 77 Resp: 16 SpO2: 95 % O2 Device: Nasal cannula Oxygen Delivery: 2 LPM  Vitals:    01/30/20 0651 01/31/20 0610 02/07/20 0654   Weight: 120.7 kg (266 lb) 134.7 kg (297 lb) 135.5 kg (298 lb 12.8 oz)     Vital Signs with Ranges  Temp:  [98.2  F (36.8  C)-99.3  F (37.4  C)] 98.6  F (37  C)  Pulse:  [76] 76  Heart Rate:  [74-78] 77  Resp:  [16-18] 16  BP: (110-132)/(61-67) 110/65  SpO2:  [88 %-97 %] 95 %    Constitutional: Awake, alert,  cooperative, no apparent distress  Lungs: Clear to auscultation bilaterally, no crackles or wheezing  Cardiovascular: Regular rate and rhythm, normal S1 and S2, and no murmur noted  Abdomen: multiple drains   Skin: No rashes, no cyanosis, no edema  Other:    Medications     dextrose       HYDROmorphone       sodium chloride 20 mL/hr at 02/04/20 1729       amLODIPine  5 mg Oral or Feeding Tube Daily     DULoxetine  120 mg Oral QAM     furosemide  40 mg Oral Daily     gabapentin  1,600 mg Oral or Feeding Tube TID     insulin aspart  1-6 Units Subcutaneous Q4H     insulin glargine  27 Units Subcutaneous QAM AC     ipratropium - albuterol 0.5 mg/2.5 mg/3 mL  3 mL Nebulization 4x daily     levothyroxine  88 mcg Oral or Feeding Tube QAM AC     methadone  10 mg Oral Daily at 4 pm     methadone  20 mg Oral or Feeding Tube BID    Or     methadone  20 mg Oral BID     micafungin  100 mg Intravenous Q24H     pantoprazole  40 mg Oral BID AC    Or     pantoprazole  40 mg Oral or Feeding Tube BID AC     piperacillin-tazobactam  3.375 g Intravenous Q6H     polyethylene glycol  17 g Oral Daily     sennosides  2 tablet Oral BID     simvastatin  20 mg Oral or Feeding Tube At Bedtime     sodium chloride (PF)  10 mL Intracatheter Q8H     sodium chloride (PF)  10 mL Irrigation Q8H     sodium chloride (PF)  3 mL Intracatheter Q8H     traZODone  100 mg Oral At Bedtime     vancomycin (VANCOCIN) IV  2,000 mg Intravenous Q24H       Data   All microbiology laboratory data reviewed.  Recent Labs   Lab Test 02/07/20  0610 02/05/20  0545 02/03/20  0600   WBC 14.4* 13.9* 16.1*   HGB 9.2* 9.0* 8.8*   HCT 30.6* 30.6* 30.7*   MCV 88 88 89    368 407     Recent Labs   Lab Test 02/08/20  0600 02/07/20  0610 02/06/20  0600   CR 0.50* 0.51* 0.48*     No lab results found.  Recent Labs   Lab Test 01/27/20  1035 01/27/20  1000 01/25/20  2336 01/25/20  2241 01/04/20  1455 01/02/20  1233 12/30/19  0425 12/29/19  1507 12/29/19  1435   CULT No anaerobes  isolated  Moderate growth  Streptococcus mitis group  Susceptibility testing not routinely done  *  Moderate growth  Haemophilus parainfluenzae  Susceptibility testing not routinely done  *  On day 1, isolated in broth only:  Candida glabrata complex  Susceptibility testing not routinely done  * No anaerobes isolated  Moderate growth  Streptococcus mitis group  Susceptibility testing not routinely done  *  Moderate growth  Haemophilus parainfluenzae  Susceptibility testing not routinely done  *  Light growth  Enterococcus faecalis  *  On day 1, isolated in broth only:  Candida glabrata complex  Susceptibility testing not routinely done  *  Susceptibility testing requested by  Dr Cardenas, [phone],  would like routine sens done on C. Glabrata. 1.31.20 1007. BENITO   No growth  50,000 to 100,000 colonies/mL  Pseudomonas aeruginosa  * No growth  Heavy growth  Methicillin resistant Staphylococcus aureus (MRSA)  *  Heavy growth  Streptococcus mitis group  Susceptibility testing not routinely done  *  Heavy growth  Haemophilus parainfluenzae  Susceptibility testing not routinely done  * No growth Cultured on the 1st day of incubation:  Staphylococcus hominis  *  Critical Value/Significant Value, preliminary result only, called to and read back by  Sangeeta Taylor RN [facility] 1.3.20 @9946 AE    Susceptibility testing done on previous specimen Methicillin resistant Staphylococcus aureus (MRSA) isolated* No growth Cultured on the 1st day of incubation:  Staphylococcus hominis  *  Critical Value/Significant Value, preliminary result only, called to and read back by  Suhail Escobar RN from [facility] ICU. 12/30/19 at 1202.TV.    Cultured on the 1st day of incubation:  Staphylococcus epidermidis  *  (Note)  POSITIVE for Staphylococci other than S.aureus, S.epidermidis and  S.lugdunensis, by [employer] multiplex nucleic acid test.  Coagulase-negative staphylococci are the most common venipuncture or  collection  associated skin CONTAMINANTS grown in blood cultures.  Final identification and antimicrobial susceptibility testing will be  verified by standard methods.    Specimen tested with [employer] multiplex, gram-positive blood culture  nucleic acid test for the following targets: Staph aureus, Staph  epidermidis, Staph lugdunensis, other Staph species, Enterococcus  faecalis, Enterococcus faecium, Streptococcus species, S. agalactiae,  S. anginosus grp., S. pneumoniae, S. pyogenes, Listeria sp., mecA  (methicillin resistance) and Nick/B (vancomycin resistance).    Critical Value/Significant Value called to and read back by FANNY SAMANO RN 12/30/2019 @ 6431 BC         Attestation:  Total time on the floor involved in the patient's care: 35 minutes. Total time spent in counseling/care coordination: >50%

## 2020-02-08 NOTE — PROGRESS NOTES
General surgery note    All drain and tubes currently doing their job.  Plans in place for radiographic interrogation next week  Abdomen soft, tolerating some clears  No change in recommendations

## 2020-02-09 LAB
CREAT SERPL-MCNC: 0.48 MG/DL (ref 0.52–1.04)
GFR SERPL CREATININE-BSD FRML MDRD: >90 ML/MIN/{1.73_M2}
GLUCOSE BLDC GLUCOMTR-MCNC: 126 MG/DL (ref 70–99)
GLUCOSE BLDC GLUCOMTR-MCNC: 127 MG/DL (ref 70–99)
GLUCOSE BLDC GLUCOMTR-MCNC: 128 MG/DL (ref 70–99)
GLUCOSE BLDC GLUCOMTR-MCNC: 129 MG/DL (ref 70–99)
GLUCOSE BLDC GLUCOMTR-MCNC: 144 MG/DL (ref 70–99)

## 2020-02-09 PROCEDURE — 25000132 ZZH RX MED GY IP 250 OP 250 PS 637: Mod: GY | Performed by: INTERNAL MEDICINE

## 2020-02-09 PROCEDURE — 40000239 ZZH STATISTIC VAT ROUNDS

## 2020-02-09 PROCEDURE — 12000000 ZZH R&B MED SURG/OB

## 2020-02-09 PROCEDURE — 25000131 ZZH RX MED GY IP 250 OP 636 PS 637: Mod: GY | Performed by: INTERNAL MEDICINE

## 2020-02-09 PROCEDURE — 25000125 ZZHC RX 250: Performed by: HOSPITALIST

## 2020-02-09 PROCEDURE — 25000132 ZZH RX MED GY IP 250 OP 250 PS 637: Mod: GY | Performed by: HOSPITALIST

## 2020-02-09 PROCEDURE — 25000132 ZZH RX MED GY IP 250 OP 250 PS 637: Mod: GY

## 2020-02-09 PROCEDURE — 94640 AIRWAY INHALATION TREATMENT: CPT | Mod: 76

## 2020-02-09 PROCEDURE — 40000275 ZZH STATISTIC RCP TIME EA 10 MIN

## 2020-02-09 PROCEDURE — 82565 ASSAY OF CREATININE: CPT | Performed by: HOSPITALIST

## 2020-02-09 PROCEDURE — 27210437 ZZH NUTRITION PRODUCT SEMIELEM INTERMED LITER

## 2020-02-09 PROCEDURE — 25000128 H RX IP 250 OP 636: Performed by: HOSPITALIST

## 2020-02-09 PROCEDURE — 25800030 ZZH RX IP 258 OP 636: Performed by: INTERNAL MEDICINE

## 2020-02-09 PROCEDURE — 25000128 H RX IP 250 OP 636: Performed by: INTERNAL MEDICINE

## 2020-02-09 PROCEDURE — 94640 AIRWAY INHALATION TREATMENT: CPT

## 2020-02-09 PROCEDURE — 00000146 ZZHCL STATISTIC GLUCOSE BY METER IP

## 2020-02-09 PROCEDURE — 99232 SBSQ HOSP IP/OBS MODERATE 35: CPT | Performed by: HOSPITALIST

## 2020-02-09 PROCEDURE — 25800030 ZZH RX IP 258 OP 636: Performed by: HOSPITALIST

## 2020-02-09 RX ORDER — OXYCODONE HCL 20 MG/ML
20 CONCENTRATE, ORAL ORAL EVERY 4 HOURS PRN
Status: DISCONTINUED | OUTPATIENT
Start: 2020-02-09 | End: 2020-02-09

## 2020-02-09 RX ADMIN — METHADONE HYDROCHLORIDE 20 MG: 10 TABLET ORAL at 08:49

## 2020-02-09 RX ADMIN — LEVOTHYROXINE SODIUM 88 MCG: 88 TABLET ORAL at 06:15

## 2020-02-09 RX ADMIN — PIPERACILLIN AND TAZOBACTAM 3.38 G: 3; .375 INJECTION, POWDER, LYOPHILIZED, FOR SOLUTION INTRAVENOUS at 11:07

## 2020-02-09 RX ADMIN — PIPERACILLIN AND TAZOBACTAM 3.38 G: 3; .375 INJECTION, POWDER, LYOPHILIZED, FOR SOLUTION INTRAVENOUS at 00:16

## 2020-02-09 RX ADMIN — PIPERACILLIN AND TAZOBACTAM 3.38 G: 3; .375 INJECTION, POWDER, LYOPHILIZED, FOR SOLUTION INTRAVENOUS at 22:57

## 2020-02-09 RX ADMIN — METHADONE HYDROCHLORIDE 20 MG: 10 TABLET ORAL at 21:37

## 2020-02-09 RX ADMIN — MICONAZOLE NITRATE: 20 POWDER TOPICAL at 00:44

## 2020-02-09 RX ADMIN — PANTOPRAZOLE SODIUM 40 MG: 40 TABLET, DELAYED RELEASE ORAL at 07:01

## 2020-02-09 RX ADMIN — GABAPENTIN 1600 MG: 800 TABLET, FILM COATED ORAL at 16:48

## 2020-02-09 RX ADMIN — DULOXETINE HYDROCHLORIDE 120 MG: 60 CAPSULE, DELAYED RELEASE ORAL at 08:49

## 2020-02-09 RX ADMIN — PIPERACILLIN AND TAZOBACTAM 3.38 G: 3; .375 INJECTION, POWDER, LYOPHILIZED, FOR SOLUTION INTRAVENOUS at 16:44

## 2020-02-09 RX ADMIN — IPRATROPIUM BROMIDE AND ALBUTEROL SULFATE 3 ML: .5; 3 SOLUTION RESPIRATORY (INHALATION) at 10:29

## 2020-02-09 RX ADMIN — IPRATROPIUM BROMIDE AND ALBUTEROL SULFATE 3 ML: .5; 3 SOLUTION RESPIRATORY (INHALATION) at 06:47

## 2020-02-09 RX ADMIN — FUROSEMIDE 40 MG: 40 TABLET ORAL at 08:50

## 2020-02-09 RX ADMIN — OXYCODONE HYDROCHLORIDE 20 MG: 100 SOLUTION ORAL at 10:02

## 2020-02-09 RX ADMIN — GABAPENTIN 1600 MG: 800 TABLET, FILM COATED ORAL at 21:37

## 2020-02-09 RX ADMIN — PIPERACILLIN AND TAZOBACTAM 3.38 G: 3; .375 INJECTION, POWDER, LYOPHILIZED, FOR SOLUTION INTRAVENOUS at 06:15

## 2020-02-09 RX ADMIN — INSULIN GLARGINE 27 UNITS: 100 INJECTION, SOLUTION SUBCUTANEOUS at 09:27

## 2020-02-09 RX ADMIN — AMLODIPINE BESYLATE 5 MG: 5 TABLET ORAL at 08:49

## 2020-02-09 RX ADMIN — TRAZODONE HYDROCHLORIDE 100 MG: 100 TABLET ORAL at 22:13

## 2020-02-09 RX ADMIN — SIMVASTATIN 20 MG: 20 TABLET, FILM COATED ORAL at 21:36

## 2020-02-09 RX ADMIN — ALBUTEROL SULFATE 2.5 MG: 2.5 SOLUTION RESPIRATORY (INHALATION) at 01:05

## 2020-02-09 RX ADMIN — SENNOSIDES 2 TABLET: 8.6 TABLET, FILM COATED ORAL at 08:50

## 2020-02-09 RX ADMIN — OXYCODONE HYDROCHLORIDE 20 MG: 100 SOLUTION ORAL at 14:09

## 2020-02-09 RX ADMIN — PANTOPRAZOLE SODIUM 40 MG: 40 TABLET, DELAYED RELEASE ORAL at 16:48

## 2020-02-09 RX ADMIN — OXYCODONE HYDROCHLORIDE 20 MG: 15 TABLET ORAL at 22:13

## 2020-02-09 RX ADMIN — MICAFUNGIN SODIUM 100 MG: 10 INJECTION, POWDER, LYOPHILIZED, FOR SOLUTION INTRAVENOUS at 11:53

## 2020-02-09 RX ADMIN — GABAPENTIN 1600 MG: 800 TABLET, FILM COATED ORAL at 08:49

## 2020-02-09 RX ADMIN — INSULIN ASPART 1 UNITS: 100 INJECTION, SOLUTION INTRAVENOUS; SUBCUTANEOUS at 11:53

## 2020-02-09 RX ADMIN — Medication: at 01:05

## 2020-02-09 RX ADMIN — IPRATROPIUM BROMIDE AND ALBUTEROL SULFATE 3 ML: .5; 3 SOLUTION RESPIRATORY (INHALATION) at 14:56

## 2020-02-09 RX ADMIN — IPRATROPIUM BROMIDE AND ALBUTEROL SULFATE 3 ML: .5; 3 SOLUTION RESPIRATORY (INHALATION) at 19:54

## 2020-02-09 RX ADMIN — VANCOMYCIN HYDROCHLORIDE 2000 MG: 10 INJECTION, POWDER, LYOPHILIZED, FOR SOLUTION INTRAVENOUS at 14:06

## 2020-02-09 RX ADMIN — OXYCODONE HYDROCHLORIDE 20 MG: 15 TABLET ORAL at 17:56

## 2020-02-09 RX ADMIN — METHADONE HYDROCHLORIDE 10 MG: 10 TABLET ORAL at 14:08

## 2020-02-09 ASSESSMENT — ACTIVITIES OF DAILY LIVING (ADL)
ADLS_ACUITY_SCORE: 27
ADLS_ACUITY_SCORE: 26
ADLS_ACUITY_SCORE: 27
ADLS_ACUITY_SCORE: 27
ADLS_ACUITY_SCORE: 26
ADLS_ACUITY_SCORE: 26

## 2020-02-09 NOTE — PROGRESS NOTES
Patient given nebulizer treatments as ordered. BS are clear but diminished in the bases bilaterally. SpO2 is 92% on 2 lpm nc.

## 2020-02-09 NOTE — PLAN OF CARE
[id number]: A/O x4. VSS on 2L O2, but desats on RA, complains of dyspnea. Repositioned as patient allows with assist of 2 and lift. 2 episodes of stool incontinence this shift. Advanced to full liquid diet, tolerating well, no N/V. Wound care done for midline abdominal wound. Dressing changed on THAI drains, Malcot, SP catheter, G tube, and J tube. G tube to gravity with light pink output d/t consumption of jello. J tube with continous TF. Discontinued PCA pump in afternoon, but pt states pain is not well controlled. Discharge pending

## 2020-02-09 NOTE — PLAN OF CARE
[id number]: Patient alert and oriented, VSS on 1.5 L O2, pt still de-sats on room air. Complains of pain in abdomen, pain pump in use- effective. Repositioning assistance provided in bed, this helps with pain also. Assist of 2 with repositioning. Incontinent of soft stool. Wound care done for midline abdominal wound. Dressing changed on Malcot drain, erythema noted at drain site, output thick, mucous, tan. Dressings on bilateral Gonzalo's CDI, output milky. SP catheter site WNL, adequate urine output. Pt complained of bladder spasms today, repositioning tube resulted in relief. G tube to gravity, pink tinged output d/t consumption of jello. Pt tolerating clear liquids and milk. J tube with continuous TF.Morning BG corrected with sliding scale insulin. Pain pump continuous rate decreased today. Goal for pain pump to be discontinued tomorrow evening. Discharge plan pending.

## 2020-02-09 NOTE — PROGRESS NOTES
[facility]    Hospitalist Progress Note    Date of Service (when I saw the patient): 02/09/2020    Assessment & Plan   Bhavani White is a pleasant 64 year old woman who was admitted on 1/25/2020 for evaluation of abdominal pain and increased drainage from G tube site.  Current problems include:     Complex anterior upper abdominal fluid collections, with recent discharge on January 21, 2020   - on 12/29/2019 had: perforated duodenal ulcer, s/p ex lap, TRUONG, Malecot drain placement with perforation, and wound VAC placement on 12/29/2019.   - on 1/1/20 had: Repeat ex-lap, TRUONG, closure of duodenal perforation over Malecot, G-tube/J-tube placements  - General Surgery following; underwent additional drain placements 1/27  - had trial of full liquid diet 2/2 --> did not tolerate that well; would like to try again. Advanced to full liquids again today  - continue IV Vancomycin and Zosyn. Micafungin added 1/3 (for C. Glabrata)  - appreciate ID input  - transitioning pain control from dilaudid PCA to orals.   -- weaning off PCA step-wise today   -- back on PTA methadone dosing (re-added mid day 10mg dose 2/8)   -- starting oxycodone 20mg po Q4h prn   -- will have 1mg IV dilaudid available prn for dressing changes, cleaning, and transfers.    Vaginal irritation: brought up yesterday. Nursing had tried anti fungal powder, which was unhelpful. Doubt yeast infection given IV micafungin. More likely pt has irritation from occasional stool and poor hygiene.   - suggested attention to cleaning area with barrier cream or ointment.    Lines/access:  - Patient had a new PICC line placed 1/31.  Her J-tube was pulled on 2/3, and was replaced on 2/4 by IR    Acute on chronic constipation; resolved.  Currently having loose stools.  - continue current bowel meds     Dyslipidemia  - continue simvastatin     Hypertension; pressures stable  - continue amlodipine    Hypoxia, with some dyspnea with exertion.  Bilateral pleural  effusions noted in recent imaging studies.  Stable over the past 1 or more weeks.  Bhavani says she was not on 02 at home, however.  - CXR 2/5 indicated bilateral pleural effusions as previous  - continue daily lasix. Pt has noticeably lost fluid weight since a week ago.  - encourage IS     Diabetes mellitus, insulin-dependent: was on Lantus prior to admission. Good glycemic control here.  - continue prior to admission Lantus 27 units Q a.m.  - Insulin sliding scale, medium.     Hypothyroidism: Stable.  - continue prior to admission levothyroxine      Chronic pain; stable.  - continue methadone, cymbalta  - continue gabapentin    Depression with anxiety; stable.  - continue cymbalta     GERD: Stable.  - continue PPI     Insomnia: Stable.  - continue prior to admission trazodone      DVT Prophylaxis: Pneumatic Compression Devices and Ambulate every shift  Code Status: Full Code    Disposition: Expected discharge: maybe by early next week.      Morelia Lin MD   Internal Medicine Hospitalist      Interval History   Patient off oxygen satting in low 90s this morning. We discussed plan to discontinue PCA by the end of the day. She is a little anxious, but agreeable. She would like to be sure that she can get prn dilaudid for transfers and dressing changes, etc, which seems reasonable. Tolerating clears well and asks to advance to full liquid.     Data reviewed today: I reviewed all new labs and imaging results over the last 24 hours.    Physical Exam   Temp: 96.8  F (36  C) Temp src: Oral BP: 123/65 Pulse: 81 Heart Rate: 95 Resp: 16 SpO2: 92 % O2 Device: Nasal cannula Oxygen Delivery: 2 LPM  Vitals:    01/30/20 0651 01/31/20 0610 02/07/20 0654   Weight: 120.7 kg (266 lb) 134.7 kg (297 lb) 135.5 kg (298 lb 12.8 oz)     Vital Signs with Ranges  Temp:  [96.7  F (35.9  C)-96.8  F (36  C)] 96.8  F (36  C)  Pulse:  [81] 81  Heart Rate:  [71-95] 95  Resp:  [16-18] 16  BP: (122-124)/(58-65) 123/65  SpO2:  [86 %-95 %] 92  %  I/O last 3 completed shifts:  In: 4329 [P.O.:450; I.V.:1812; NG/GT:2067]  Out: 4187 [Urine:3825; Emesis/NG output:250; Drains:112]    Constitutional: awake, no apparent distress; lying in bed  HEENT: sclerae clear; MM's moist; n/c in nares  Respiratory: fair-poor a/e bilaterally; decreased at bases; no wheezing or rhonchi  Cardiovascular: Regular rate and rhythm, S1, S2 noted; no m/r/g  GI: abdomen obese; multiple drains in place:  THAI's - 1 over mid-upper abdomen, 1 in LUQ; malecot Right mid-abdomen; THAI mid-abdomen, G tube upper abdomen; lower: suprapubic catheter; + bowel sounds; soft; mild diffuse tenderness, non-distended  Skin/Integumen: no rashes, no cyanosis, no jaundice; wounds/surgical incisions covered by dressings  Musculoskeletal: no edema  Neurologic: follows directions well; no focal deficits      Medications     dextrose       HYDROmorphone       sodium chloride 20 mL/hr at 02/08/20 1017       amLODIPine  5 mg Oral or Feeding Tube Daily     DULoxetine  120 mg Oral QAM     furosemide  40 mg Oral Daily     gabapentin  1,600 mg Oral or Feeding Tube TID     insulin aspart  1-6 Units Subcutaneous Q4H     insulin glargine  27 Units Subcutaneous QAM AC     ipratropium - albuterol 0.5 mg/2.5 mg/3 mL  3 mL Nebulization 4x daily     levothyroxine  88 mcg Oral or Feeding Tube QAM AC     methadone  10 mg Oral Daily at 4 pm     methadone  20 mg Oral or Feeding Tube BID    Or     methadone  20 mg Oral BID     micafungin  100 mg Intravenous Q24H     pantoprazole  40 mg Oral BID AC    Or     pantoprazole  40 mg Oral or Feeding Tube BID AC     piperacillin-tazobactam  3.375 g Intravenous Q6H     polyethylene glycol  17 g Oral Daily     sennosides  2 tablet Oral BID     simvastatin  20 mg Oral or Feeding Tube At Bedtime     sodium chloride (PF)  10 mL Intracatheter Q8H     sodium chloride (PF)  10 mL Irrigation Q8H     sodium chloride (PF)  3 mL Intracatheter Q8H     traZODone  100 mg Oral At Bedtime     vancomycin  (VANCOCIN) IV  2,000 mg Intravenous Q24H       Data   Recent Labs   Lab 02/09/20  0620 02/08/20  0600 02/07/20  0610 02/06/20  0600 02/05/20  0545  02/03/20  0600   WBC  --   --  14.4*  --  13.9*  --  16.1*   HGB  --   --  9.2*  --  9.0*  --  8.8*   MCV  --   --  88  --  88  --  89   PLT  --   --  409  --  368  --  407   NA  --   --  138 138 139  --  138   POTASSIUM  --   --  3.7 3.6 3.6  --  3.8   CHLORIDE  --   --  94 93* 93*  --  97   CO2  --   --  44* 43* 45*  --  42*   BUN  --   --  19 17 18  --  19   CR 0.48* 0.50* 0.51* 0.48* 0.50*   < > 0.53   ANIONGAP  --   --  <1* 2* 1*  --  <1*   DORY  --   --  10.1 9.8 10.0  --  9.5   GLC  --   --  128* 150* 132*  --  147*    < > = values in this interval not displayed.

## 2020-02-10 LAB
ANION GAP SERPL CALCULATED.3IONS-SCNC: 1 MMOL/L (ref 3–14)
BUN SERPL-MCNC: 23 MG/DL (ref 7–30)
CALCIUM SERPL-MCNC: 10.2 MG/DL (ref 8.5–10.1)
CHLORIDE SERPL-SCNC: 94 MMOL/L (ref 94–109)
CO2 SERPL-SCNC: 41 MMOL/L (ref 20–32)
CREAT SERPL-MCNC: 0.5 MG/DL (ref 0.52–1.04)
ERYTHROCYTE [DISTWIDTH] IN BLOOD BY AUTOMATED COUNT: 15.3 % (ref 10–15)
GFR SERPL CREATININE-BSD FRML MDRD: >90 ML/MIN/{1.73_M2}
GLUCOSE BLDC GLUCOMTR-MCNC: 105 MG/DL (ref 70–99)
GLUCOSE BLDC GLUCOMTR-MCNC: 126 MG/DL (ref 70–99)
GLUCOSE BLDC GLUCOMTR-MCNC: 130 MG/DL (ref 70–99)
GLUCOSE BLDC GLUCOMTR-MCNC: 134 MG/DL (ref 70–99)
GLUCOSE BLDC GLUCOMTR-MCNC: 170 MG/DL (ref 70–99)
GLUCOSE SERPL-MCNC: 129 MG/DL (ref 70–99)
HCT VFR BLD AUTO: 29.9 % (ref 35–47)
HGB BLD-MCNC: 9 G/DL (ref 11.7–15.7)
MAGNESIUM SERPL-MCNC: 1.9 MG/DL (ref 1.6–2.3)
MCH RBC QN AUTO: 26.2 PG (ref 26.5–33)
MCHC RBC AUTO-ENTMCNC: 30.1 G/DL (ref 31.5–36.5)
MCV RBC AUTO: 87 FL (ref 78–100)
PLATELET # BLD AUTO: 342 10E9/L (ref 150–450)
POTASSIUM SERPL-SCNC: 3.6 MMOL/L (ref 3.4–5.3)
RBC # BLD AUTO: 3.44 10E12/L (ref 3.8–5.2)
SODIUM SERPL-SCNC: 136 MMOL/L (ref 133–144)
WBC # BLD AUTO: 12.9 10E9/L (ref 4–11)

## 2020-02-10 PROCEDURE — 00000146 ZZHCL STATISTIC GLUCOSE BY METER IP

## 2020-02-10 PROCEDURE — 40000239 ZZH STATISTIC VAT ROUNDS

## 2020-02-10 PROCEDURE — 12000000 ZZH R&B MED SURG/OB

## 2020-02-10 PROCEDURE — 25000128 H RX IP 250 OP 636: Performed by: HOSPITALIST

## 2020-02-10 PROCEDURE — 85027 COMPLETE CBC AUTOMATED: CPT | Performed by: INTERNAL MEDICINE

## 2020-02-10 PROCEDURE — 99233 SBSQ HOSP IP/OBS HIGH 50: CPT | Performed by: INTERNAL MEDICINE

## 2020-02-10 PROCEDURE — 25800030 ZZH RX IP 258 OP 636: Performed by: INTERNAL MEDICINE

## 2020-02-10 PROCEDURE — 25000132 ZZH RX MED GY IP 250 OP 250 PS 637: Mod: GY

## 2020-02-10 PROCEDURE — 25000132 ZZH RX MED GY IP 250 OP 250 PS 637: Mod: GY | Performed by: INTERNAL MEDICINE

## 2020-02-10 PROCEDURE — 94640 AIRWAY INHALATION TREATMENT: CPT | Mod: 76

## 2020-02-10 PROCEDURE — 25000131 ZZH RX MED GY IP 250 OP 636 PS 637: Mod: GY | Performed by: INTERNAL MEDICINE

## 2020-02-10 PROCEDURE — 25000128 H RX IP 250 OP 636: Performed by: INTERNAL MEDICINE

## 2020-02-10 PROCEDURE — 40000275 ZZH STATISTIC RCP TIME EA 10 MIN

## 2020-02-10 PROCEDURE — 80048 BASIC METABOLIC PNL TOTAL CA: CPT | Performed by: INTERNAL MEDICINE

## 2020-02-10 PROCEDURE — 25800030 ZZH RX IP 258 OP 636: Performed by: HOSPITALIST

## 2020-02-10 PROCEDURE — 25000125 ZZHC RX 250: Performed by: HOSPITALIST

## 2020-02-10 PROCEDURE — 27210437 ZZH NUTRITION PRODUCT SEMIELEM INTERMED LITER

## 2020-02-10 PROCEDURE — 83735 ASSAY OF MAGNESIUM: CPT | Performed by: INTERNAL MEDICINE

## 2020-02-10 PROCEDURE — 25000132 ZZH RX MED GY IP 250 OP 250 PS 637: Mod: GY | Performed by: HOSPITALIST

## 2020-02-10 RX ADMIN — PANTOPRAZOLE SODIUM 40 MG: 40 TABLET, DELAYED RELEASE ORAL at 05:55

## 2020-02-10 RX ADMIN — IPRATROPIUM BROMIDE AND ALBUTEROL SULFATE 3 ML: .5; 3 SOLUTION RESPIRATORY (INHALATION) at 06:53

## 2020-02-10 RX ADMIN — METHADONE HYDROCHLORIDE 10 MG: 10 TABLET ORAL at 18:14

## 2020-02-10 RX ADMIN — PIPERACILLIN AND TAZOBACTAM 3.38 G: 3; .375 INJECTION, POWDER, LYOPHILIZED, FOR SOLUTION INTRAVENOUS at 05:14

## 2020-02-10 RX ADMIN — INSULIN GLARGINE 27 UNITS: 100 INJECTION, SOLUTION SUBCUTANEOUS at 05:58

## 2020-02-10 RX ADMIN — VANCOMYCIN HYDROCHLORIDE 2000 MG: 10 INJECTION, POWDER, LYOPHILIZED, FOR SOLUTION INTRAVENOUS at 14:18

## 2020-02-10 RX ADMIN — PANTOPRAZOLE SODIUM 40 MG: 40 TABLET, DELAYED RELEASE ORAL at 18:14

## 2020-02-10 RX ADMIN — GABAPENTIN 1600 MG: 800 TABLET, FILM COATED ORAL at 22:39

## 2020-02-10 RX ADMIN — OXYCODONE HYDROCHLORIDE 20 MG: 15 TABLET ORAL at 22:39

## 2020-02-10 RX ADMIN — OXYCODONE HYDROCHLORIDE 20 MG: 15 TABLET ORAL at 18:36

## 2020-02-10 RX ADMIN — IPRATROPIUM BROMIDE AND ALBUTEROL SULFATE 3 ML: .5; 3 SOLUTION RESPIRATORY (INHALATION) at 11:23

## 2020-02-10 RX ADMIN — DULOXETINE HYDROCHLORIDE 120 MG: 60 CAPSULE, DELAYED RELEASE ORAL at 09:27

## 2020-02-10 RX ADMIN — METHADONE HYDROCHLORIDE 20 MG: 10 TABLET ORAL at 20:46

## 2020-02-10 RX ADMIN — FUROSEMIDE 40 MG: 40 TABLET ORAL at 09:27

## 2020-02-10 RX ADMIN — TRAZODONE HYDROCHLORIDE 100 MG: 100 TABLET ORAL at 22:39

## 2020-02-10 RX ADMIN — METHADONE HYDROCHLORIDE 20 MG: 10 TABLET ORAL at 09:27

## 2020-02-10 RX ADMIN — MICAFUNGIN SODIUM 100 MG: 10 INJECTION, POWDER, LYOPHILIZED, FOR SOLUTION INTRAVENOUS at 12:50

## 2020-02-10 RX ADMIN — OXYCODONE HYDROCHLORIDE 20 MG: 15 TABLET ORAL at 05:14

## 2020-02-10 RX ADMIN — GABAPENTIN 1600 MG: 800 TABLET, FILM COATED ORAL at 09:27

## 2020-02-10 RX ADMIN — SIMVASTATIN 20 MG: 20 TABLET, FILM COATED ORAL at 22:39

## 2020-02-10 RX ADMIN — OXYCODONE HYDROCHLORIDE 20 MG: 15 TABLET ORAL at 10:09

## 2020-02-10 RX ADMIN — PIPERACILLIN AND TAZOBACTAM 3.38 G: 3; .375 INJECTION, POWDER, LYOPHILIZED, FOR SOLUTION INTRAVENOUS at 22:39

## 2020-02-10 RX ADMIN — IPRATROPIUM BROMIDE AND ALBUTEROL SULFATE 3 ML: .5; 3 SOLUTION RESPIRATORY (INHALATION) at 20:22

## 2020-02-10 RX ADMIN — LEVOTHYROXINE SODIUM 88 MCG: 88 TABLET ORAL at 05:54

## 2020-02-10 RX ADMIN — IPRATROPIUM BROMIDE AND ALBUTEROL SULFATE 3 ML: .5; 3 SOLUTION RESPIRATORY (INHALATION) at 15:55

## 2020-02-10 RX ADMIN — GABAPENTIN 1600 MG: 800 TABLET, FILM COATED ORAL at 18:14

## 2020-02-10 RX ADMIN — PIPERACILLIN AND TAZOBACTAM 3.38 G: 3; .375 INJECTION, POWDER, LYOPHILIZED, FOR SOLUTION INTRAVENOUS at 10:13

## 2020-02-10 RX ADMIN — OXYCODONE HYDROCHLORIDE 20 MG: 15 TABLET ORAL at 14:23

## 2020-02-10 RX ADMIN — PIPERACILLIN AND TAZOBACTAM 3.38 G: 3; .375 INJECTION, POWDER, LYOPHILIZED, FOR SOLUTION INTRAVENOUS at 18:13

## 2020-02-10 RX ADMIN — AMLODIPINE BESYLATE 5 MG: 5 TABLET ORAL at 09:27

## 2020-02-10 RX ADMIN — INSULIN ASPART 1 UNITS: 100 INJECTION, SOLUTION INTRAVENOUS; SUBCUTANEOUS at 18:17

## 2020-02-10 ASSESSMENT — ACTIVITIES OF DAILY LIVING (ADL)
ADLS_ACUITY_SCORE: 28
ADLS_ACUITY_SCORE: 28
ADLS_ACUITY_SCORE: 29
ADLS_ACUITY_SCORE: 26

## 2020-02-10 NOTE — PLAN OF CARE
A&Ox4, anxious. VSS on 2L NC with humidifier. Total care A2 lift with T/R, refused pillows for repo. 1 Med soft/creamy incontinent BM. Mepilex coccyx and L thigh mepilex CDI. 2 THAI, SP, Malecot and G tube dressings CDI. J tube dressing with small drainage, TF running at 60/hr and Q4 60ml flushes. C/o pain in L mid abdomen beneath THAI site. Oxycodone and sched methadone given.  at bedside, but relates will be leaving tonight. Pt very anxious about L sided pain, wanting to discuss with MD tomorrow. Continue to monitor.

## 2020-02-10 NOTE — PROGRESS NOTES
[facility]    Infectious Disease Progress Note    Date of Service (when I saw the patient): 02/10/2020     Assessment & Plan   Bhavani White is a 64 year old female who was admitted on 1/25/2020.       Impression:  1. 64 y.o who was recently admitted to the hospital was discharged on 21 January, that was a prolonged stay for perforated duodenal ulcer, s/p ex lap, TRUONG, Malecot drain placement with perforation, and wound VAC on 12/29/2019. Repeat ex-lap, TRUONG, closure of duodenal perforation over Malecot, G-tube/J-tube placements, delayed primary closure on 1/1/2020.  2. Admitted this occasion with abdominal pain, drainage from the G tube site.   3. CT scan with two large abscess cavities.   4. S/P multiple drain placement.   5. 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   6. She is on IV vancomycin and zosyn and micafungin   7. Diabetes.      Recommendations:   Continue zosyn for the: enterococcus, strep, h parainfluenza and also since intraabdominal abscesses for possible anaerobes which might be involved but not growing.   Continue on Vancomycin for the MRSA from the cultures.   On micafungin for the candida glabrata, can be switched to oral fluc high dose when ready for discharge.   Will recommend repeating C scan this week.       Chacho Solis MD    Interval History   Feels tiered   Had some difficulty breathing, now improved   No fever     Physical Exam   Temp: 97.3  F (36.3  C) Temp src: Oral BP: (!) 151/65   Heart Rate: 76 Resp: 16 SpO2: 98 % O2 Device: Nasal cannula Oxygen Delivery: 2 LPM  Vitals:    01/30/20 0651 01/31/20 0610 02/07/20 0654   Weight: 120.7 kg (266 lb) 134.7 kg (297 lb) 135.5 kg (298 lb 12.8 oz)     Vital Signs with Ranges  Temp:  [97.3  F (36.3  C)-98.6  F (37  C)] 97.3  F (36.3  C)  Heart Rate:  [76-84] 76  Resp:  [16] 16  BP: (128-151)/(58-65) 151/65  SpO2:  [92 %-98 %] 98 %    Constitutional: Awake,  alert, cooperative, no apparent distress  Lungs: Clear to auscultation bilaterally, no crackles or wheezing  Cardiovascular: Regular rate and rhythm, normal S1 and S2, and no murmur noted  Abdomen: multiple drains   Skin: No rashes, no cyanosis, no edema  Other:    Medications     dextrose       sodium chloride 20 mL/hr at 02/09/20 0900       amLODIPine  5 mg Oral or Feeding Tube Daily     DULoxetine  120 mg Oral QAM     furosemide  40 mg Oral Daily     gabapentin  1,600 mg Oral or Feeding Tube TID     insulin aspart  1-6 Units Subcutaneous Q4H     insulin glargine  27 Units Subcutaneous QAM AC     ipratropium - albuterol 0.5 mg/2.5 mg/3 mL  3 mL Nebulization 4x daily     levothyroxine  88 mcg Oral or Feeding Tube QAM AC     methadone  10 mg Oral Daily at 4 pm     methadone  20 mg Oral or Feeding Tube BID    Or     methadone  20 mg Oral BID     micafungin  100 mg Intravenous Q24H     pantoprazole  40 mg Oral BID AC    Or     pantoprazole  40 mg Oral or Feeding Tube BID AC     piperacillin-tazobactam  3.375 g Intravenous Q6H     polyethylene glycol  17 g Oral Daily     sennosides  2 tablet Oral BID     simvastatin  20 mg Oral or Feeding Tube At Bedtime     sodium chloride (PF)  10 mL Intracatheter Q8H     sodium chloride (PF)  10 mL Irrigation Q8H     sodium chloride (PF)  3 mL Intracatheter Q8H     traZODone  100 mg Oral At Bedtime     vancomycin (VANCOCIN) IV  2,000 mg Intravenous Q24H       Data   All microbiology laboratory data reviewed.  Recent Labs   Lab Test 02/10/20  0500 02/07/20  0610 02/05/20  0545   WBC 12.9* 14.4* 13.9*   HGB 9.0* 9.2* 9.0*   HCT 29.9* 30.6* 30.6*   MCV 87 88 88    409 368     Recent Labs   Lab Test 02/10/20  0500 02/09/20  0620 02/08/20  0600   CR 0.50* 0.48* 0.50*     No lab results found.  Recent Labs   Lab Test 01/27/20  1035 01/27/20  1000 01/25/20  2336 01/25/20  2241 01/04/20  1455 01/02/20  1233 12/30/19  0425 12/29/19  1507 12/29/19  1435   CULT No anaerobes isolated   Moderate growth  Streptococcus mitis group  Susceptibility testing not routinely done  *  Moderate growth  Haemophilus parainfluenzae  Susceptibility testing not routinely done  *  On day 1, isolated in broth only:  Candida glabrata complex  Susceptibility testing not routinely done  * No anaerobes isolated  Moderate growth  Streptococcus mitis group  Susceptibility testing not routinely done  *  Moderate growth  Haemophilus parainfluenzae  Susceptibility testing not routinely done  *  Light growth  Enterococcus faecalis  *  On day 1, isolated in broth only:  Candida glabrata complex  Susceptibility testing not routinely done  *  Susceptibility testing requested by  Dr Cardenas, [phone],  would like routine sens done on C. Glabrata. 1.31.20 1007. BENITO   No growth  50,000 to 100,000 colonies/mL  Pseudomonas aeruginosa  * No growth  Heavy growth  Methicillin resistant Staphylococcus aureus (MRSA)  *  Heavy growth  Streptococcus mitis group  Susceptibility testing not routinely done  *  Heavy growth  Haemophilus parainfluenzae  Susceptibility testing not routinely done  * No growth Cultured on the 1st day of incubation:  Staphylococcus hominis  *  Critical Value/Significant Value, preliminary result only, called to and read back by  Sangeeta Taylor RN [facility] 1.3.20 @6296 AEM    Susceptibility testing done on previous specimen Methicillin resistant Staphylococcus aureus (MRSA) isolated* No growth Cultured on the 1st day of incubation:  Staphylococcus hominis  *  Critical Value/Significant Value, preliminary result only, called to and read back by  Suhail Escobar RN from [facility] ICU. 12/30/19 at 1202.TV.    Cultured on the 1st day of incubation:  Staphylococcus epidermidis  *  (Note)  POSITIVE for Staphylococci other than S.aureus, S.epidermidis and  S.lugdunensis, by [employer] multiplex nucleic acid test.  Coagulase-negative staphylococci are the most common venipuncture or  collection associated skin  CONTAMINANTS grown in blood cultures.  Final identification and antimicrobial susceptibility testing will be  verified by standard methods.    Specimen tested with [employer] multiplex, gram-positive blood culture  nucleic acid test for the following targets: Staph aureus, Staph  epidermidis, Staph lugdunensis, other Staph species, Enterococcus  faecalis, Enterococcus faecium, Streptococcus species, S. agalactiae,  S. anginosus grp., S. pneumoniae, S. pyogenes, Listeria sp., mecA  (methicillin resistance) and Nick/B (vancomycin resistance).    Critical Value/Significant Value called to and read back by FANNY SAMANO RN 12/30/2019 @ 8022 BC         Attestation:  Total time on the floor involved in the patient's care: 35 minutes. Total time spent in counseling/care coordination: >50%

## 2020-02-10 NOTE — PROGRESS NOTES
Surgery    Feels bloated after eating more full yesterday. Passing gas and had a BM.    Abd- Soft. Drains stable. Wound stable.    A/P  Will remove IR drains today. Continue clears if nausea. Plan for TCU soon.    Ayan Lopez M.D.  [facility]  [phone]

## 2020-02-10 NOTE — PROGRESS NOTES
Bhavani White is a 64 year old woman with a history of obesity, fibromyalgia, chronic back pain on methadone and a perforated duodenal ulcer in 12/2019 status post 3 surgeries to repair with last exploratory laparotomy on January 1, 2020 s/p 2 mallencott drains, gastrostomy tube for decompression, jejunostomy tube for nutrition among other treatments, discharged form the hospital on 1/21/2020 and readmitted to the hospital on 1/26/2020 with abdominal pain and fevers.      CT was done which showed increase in size of a right and left upper abdomen fluid collections possible abscesses. CT guided drain was placed in each collection 1/27/2020 with a large amount of drainage, left 400 mL and Right 170 mL 1/27/2020. CT scan was done on 1/31/2020 and fluid decreased greatly. The right and left IR drains have been emptied for ~ 20 mL daily from each for the past week.     Bhavani is being seen in IR follow up today.     S: Doing ok. Just eating clear liquids and ice. Sleeping well if not woken up for checks.      O: Temp:  [97.3  F (36.3  C)-98.6  F (37  C)] 97.3  F (36.3  C)  Heart Rate:  [76-84] 76  Resp:  [16] 16  BP: (128-151)/(58-65) 151/65  SpO2:  [92 %-98 %] 98 %    Labs, notes, imaging, IOs and VSs reviewed    ROUTINE ICU LABS (Last four results)  CMP  Recent Labs   Lab 02/10/20  0500 02/09/20  0620 02/08/20  0600 02/07/20  0610 02/06/20  0600 02/05/20  0545     --   --  138 138 139   POTASSIUM 3.6  --   --  3.7 3.6 3.6   CHLORIDE 94  --   --  94 93* 93*   CO2 41*  --   --  44* 43* 45*   ANIONGAP 1*  --   --  <1* 2* 1*   *  --   --  128* 150* 132*   BUN 23  --   --  19 17 18   CR 0.50* 0.48* 0.50* 0.51* 0.48* 0.50*   GFRESTIMATED >90 >90 >90 >90 >90 >90   GFRESTBLACK >90 >90 >90 >90 >90 >90   DORY 10.2*  --   --  10.1 9.8 10.0   MAG  --   --   --  1.7  --   --      CBC  Recent Labs   Lab 02/10/20  0500 02/07/20  0610 02/05/20  0545   WBC 12.9* 14.4* 13.9*   RBC 3.44* 3.46* 3.47*   HGB 9.0* 9.2* 9.0*   HCT  29.9* 30.6* 30.6*   MCV 87 88 88   MCH 26.2* 26.6 25.9*   MCHC 30.1* 30.1* 29.4*   RDW 15.3* 15.3* 15.2*    409 368     INRNo lab results found in last 7 days.  Arterial Blood GasNo lab results found in last 7 days.    General: Alert, oriented, pleasant woman with some mild anxiety   Right and left upper quadrant drains dressing intact. Both flushed and aspirated what was flushed in for a total of 20 mL NS each. Pain with aspirating on the LUQ drain.   Outputs ~ 20 mL daily from each.     A/P: Bhavani White is a 64 year old woman with a history of obesity, fibromyalgia, chronic back pain on methadone and a perforated duodenal ulcer in 12/2019 status post 3 surgeries to repair with last exploratory laparotomy on January 1, 2020 s/p 2 mallencott drains, gastrostomy tube for decompression, jejunostomy tube for nutrition among other treatments, discharged form the hospital on 1/21/2020 and readmitted to the hospital on 1/26/2020 with abdominal pain and fevers.      CT was done which showed increase in size of a right and left upper abdomen fluid collections possible abscesses. CT guided drain was placed in each collection 1/27/2020 with a large amount of drainage, left 400 mL and Right 170 mL 1/27/2020. CT scan was done on 1/31/2020 and fluid decreased greatly. The right and left IR drains have been emptied for ~ 20 mL daily from each for the past week.    Over all Bhavani is improving slowly. WBC is slowly decreasing. Outputs are stable low. Afebrile. Unclear if the IR drains are making much difference at this point in terms of improvement. The LUQ drain could be painful as there is no more fluid to drain and it is resting up against her spleen.     Discussed with Dr Lopez today and it was decided to remove the drains which I will do soon.     Thanks [facility] Interventional Radiology CNP ([phone]) (phone [phone])

## 2020-02-10 NOTE — PROGRESS NOTES
After explaining the procedure and answering questions the right and left upper quadrant pigtail drains were removed intact without complications. The left drain was painful but the right drain came out with minimal discomfort.     Gauze and tape were placed over drain punctures sites. These should be changed daily until the opening has healed.     Thanks [facility] Interventional Radiology CNP ([phone]) (phone [phone])

## 2020-02-10 NOTE — PROGRESS NOTES
CLINICAL NUTRITION SERVICES - REASSESSMENT NOTE      Malnutrition: (1/27)  % Weight Loss:  None noted  % Intake:  No decreased intake noted (has been receiving TF)  Subcutaneous Fat Loss:  None observed  Muscle Loss:  None observed  Fluid Retention:  None noted     Malnutrition Diagnosis: Patient does not meet two of the above criteria necessary for diagnosing malnutrition       EVALUATION OF PROGRESS TOWARD GOALS   Diet:  Full Liquid (advanced 2/9)    Nutrition Support:  Patient continues on goal TF as follows ~    Nutrition Support Enteral:  Type of Feeding Tube: 16 Romanian SEVERIANO Jejunostomy   Enteral Frequency:  Continuous  Enteral Regimen: Peptamen Intense VHP at 60 mL/hr  Total Enteral Provisions: 1440 kcal (12 kcal/kg), 132 g protein (2.5 g/kg), 1210 mL H2O, 7 g fiber, 109 g CHO, 96% RDI  Free Water Flush: 60 mL every 4 hours     Intake/Tolerance:    Oral - Noted patient ordering ~ 1 meal per day which consists of soup or broth, milk, pudding, jello  Oral intake yesterday was 960 mL, tolerating liquids thus far     Labs today noted and acceptable  BGM < 150 - Medium sliding scale insulin + Lantus 27 units in am     BM x 3 on 2/9 - Getting Miralax and Senokot   G-tube 175 mL, drains 270 mL     Weight 135.5 kg (2/7) - No new weight since   I/O 1713/5736 - Diuresing on Lasix       ASSESSED NUTRITION NEEDS:  Dosing Weight 121.8 kg (1/27 wt for energy), 56.8 kg (IBW for protein)  Estimated Energy Needs: 7768-6741 kcals (11-14 Kcal/Kg)  Justification: obese  Estimated Protein Needs: 115-145 grams protein (2-2.5 g pro/Kg)  Justification: hypercatabolism with acute illness and obesity guidelines        Previous Goals (2/6):   EN to meet % est needs  Evaluation: Met    Previous Nutrition Diagnosis (2/6):   No nutrition diagnosis identified at this time   Evaluation: No change      CURRENT NUTRITION DIAGNOSIS  No nutrition diagnosis identified at this time    INTERVENTIONS  Recommendations / Nutrition  Prescription  Continue Peptamen Intense VHP at 60 mL/hr as above     Implementation  None     Goals  Peptamen Intense VHP at 60 mL/hr will continue to meet % needs     MONITORING AND EVALUATION:  Progress towards goals will be monitored and evaluated per protocol and Practice Guidelines    Julia Jang RD, LD, CNSC   Clinical Dietitian - [facility]

## 2020-02-10 NOTE — PLAN OF CARE
Shift Note: A&O, up with lift, refused repositioning during the night, incont of stool, SP cath, JPx2 with tan milky output, malecot drain to the duodenum with thick brown output, venting G tube, J tube for feedings, packed midline abd wound, BGj Q4hrs, sacral meplex to coccyx and left thigh (right thigh dressing removed per pt request), PRN oxycodone available, 2L O2, COOK, LS diminished in the bases

## 2020-02-10 NOTE — PLAN OF CARE
A&O x4. SBA. VSS. Lungs clear. Tolerating full liq diet. Suprapubic cath patent. Incontinent of bowel per shift. THAI drains flushed. Pt transverse incision re-packed. New dressing applied on incision and around gastric drain. Pain mgmt with oxycodone. Pt repo q 2. Mepilex on sacrum and bilat hamstrings. I&O on drains q shift. IV abx. BG q 4. Continuous enteral feedings.

## 2020-02-10 NOTE — PROGRESS NOTES
[facility]    Hospitalist Progress Note    Date of Service (when I saw the patient): 02/10/2020    Assessment & Plan   Bhavani White is a pleasant 64 year old woman who was admitted on 1/25/2020 for evaluation of abdominal pain and increased drainage from G tube site.  Current problems include:     Complex anterior upper abdominal fluid collections, with recent discharge on January 21, 2020   - on 12/29/2019 had: perforated duodenal ulcer, s/p ex lap, TRUONG, Malecot drain placement with perforation, and wound VAC placement on 12/29/2019.   - on 1/1/20 had: Repeat ex-lap, TRUONG, closure of duodenal perforation over Malecot, G-tube/J-tube placements  - General Surgery following; underwent additional drain placements 1/27  -Her diet has been advanced to full liquid and she is tolerating it, had a bowel movement today.  - continue IV Vancomycin and Zosyn. Micafungin added 1/3 (for C. Glabrata)  - appreciate ID input  - transitioning pain control from dilaudid PCA to orals.   --Weaned off of PCA   -- back on PTA methadone dosing (re-added mid day 10mg dose 2/8)   -- starting oxycodone 20mg po Q4h prn   -- will have 1mg IV dilaudid available prn for dressing changes, cleaning, and transfers.  -The drainage is come down significantly, plan for IR drains to be removed today possibly discharge to TCU in 1 to 2 days.  Depending on antibiotic choice.  Vaginal irritation:  Doubt yeast infection given IV micafungin. More likely pt has irritation from occasional stool and poor hygiene.   - suggested attention to cleaning area with barrier cream or ointment.    Lines/access:  - Patient had a new PICC line placed 1/31.  Her J-tube was pulled on 2/3, and was replaced on 2/4 by IR    Acute on chronic constipation; resolved.  Currently having loose stools.  - continue current bowel meds     Dyslipidemia  - continue simvastatin     Hypertension; pressures stable  - continue amlodipine    Hypoxia, with some dyspnea with  exertion.  Bilateral pleural effusions noted in recent imaging studies.  Stable over the past 1 or more weeks.  Bhavani says she was not on 02 at home, however.  - CXR 2/5 indicated bilateral pleural effusions as previous  - continue daily lasix. Pt has noticeably lost fluid weight since a week ago.  - encourage IS     Diabetes mellitus, insulin-dependent: was on Lantus prior to admission. Good glycemic control here.  - continue prior to admission Lantus 27 units Q a.m.  - Insulin sliding scale, medium.     Hypothyroidism: Stable.  - continue prior to admission levothyroxine      Chronic pain; stable.  - continue methadone, cymbalta  - continue gabapentin    Depression with anxiety; stable.  - continue cymbalta     GERD: Stable.  - continue PPI     Insomnia: Stable.  - continue prior to admission trazodone      DVT Prophylaxis: Pneumatic Compression Devices and Ambulate every shift  Code Status: Full Code    Disposition: Expected discharge: 1-2 days once antibiotics  Plan in place.      Grisel Chicas MD,FACP    hospitalist     Interval History   And is feeling better, pain well controlled at rest, weaned off of PCA, plan to remove drains by IR today, she is tolerating full liquid diet well, had a bowel movement ,passing gas.    Data reviewed today: I reviewed all new labs and imaging results over the last 24 hours.    Physical Exam   Temp: 98.6  F (37  C) Temp src: Oral BP: 128/60   Heart Rate: 84 Resp: 16 SpO2: 92 % O2 Device: Nasal cannula Oxygen Delivery: 2 LPM  Vitals:    01/30/20 0651 01/31/20 0610 02/07/20 0654   Weight: 120.7 kg (266 lb) 134.7 kg (297 lb) 135.5 kg (298 lb 12.8 oz)     Vital Signs with Ranges  Temp:  [98.4  F (36.9  C)-98.6  F (37  C)] 98.6  F (37  C)  Heart Rate:  [76-84] 84  Resp:  [16] 16  BP: (128-135)/(58-60) 128/60  SpO2:  [92 %-93 %] 92 %  I/O last 3 completed shifts:  In: 1713 [P.O.:960; NG/GT:753]  Out: 5770 [Urine:5325; Emesis/NG output:175; Drains:270]    Constitutional: awake, no  apparent distress; lying in bed  HEENT: sclerae clear; MM's moist; n/c in nares  Respiratory: fair-poor a/e bilaterally; decreased at bases; no wheezing or rhonchi  Cardiovascular: Regular rate and rhythm, S1, S2 noted; no m/r/g  GI: abdomen obese; multiple drains in place:  THAI's - 1 over mid-upper abdomen, 1 in LUQ; malecot Right mid-abdomen; THAI mid-abdomen, G tube upper abdomen; lower: suprapubic catheter; + bowel sounds; soft; mild diffuse tenderness, non-distended  Skin/Integumen: no rashes, no cyanosis, no jaundice; wounds/surgical incisions covered by dressings  Musculoskeletal: no edema  Neurologic: follows directions well; no focal deficits      Medications     dextrose       sodium chloride 20 mL/hr at 02/09/20 0900       amLODIPine  5 mg Oral or Feeding Tube Daily     DULoxetine  120 mg Oral QAM     furosemide  40 mg Oral Daily     gabapentin  1,600 mg Oral or Feeding Tube TID     insulin aspart  1-6 Units Subcutaneous Q4H     insulin glargine  27 Units Subcutaneous QAM AC     ipratropium - albuterol 0.5 mg/2.5 mg/3 mL  3 mL Nebulization 4x daily     levothyroxine  88 mcg Oral or Feeding Tube QAM AC     methadone  10 mg Oral Daily at 4 pm     methadone  20 mg Oral or Feeding Tube BID    Or     methadone  20 mg Oral BID     micafungin  100 mg Intravenous Q24H     pantoprazole  40 mg Oral BID AC    Or     pantoprazole  40 mg Oral or Feeding Tube BID AC     piperacillin-tazobactam  3.375 g Intravenous Q6H     polyethylene glycol  17 g Oral Daily     sennosides  2 tablet Oral BID     simvastatin  20 mg Oral or Feeding Tube At Bedtime     sodium chloride (PF)  10 mL Intracatheter Q8H     sodium chloride (PF)  10 mL Irrigation Q8H     sodium chloride (PF)  3 mL Intracatheter Q8H     traZODone  100 mg Oral At Bedtime     vancomycin (VANCOCIN) IV  2,000 mg Intravenous Q24H       Data   Recent Labs   Lab 02/10/20  0500 02/09/20  0620 02/08/20  0600 02/07/20  0610 02/06/20  0600 02/05/20  0545   WBC 12.9*  --   --   14.4*  --  13.9*   HGB 9.0*  --   --  9.2*  --  9.0*   MCV 87  --   --  88  --  88     --   --  409  --  368     --   --  138 138 139   POTASSIUM 3.6  --   --  3.7 3.6 3.6   CHLORIDE 94  --   --  94 93* 93*   CO2 41*  --   --  44* 43* 45*   BUN 23  --   --  19 17 18   CR 0.50* 0.48* 0.50* 0.51* 0.48* 0.50*   ANIONGAP 1*  --   --  <1* 2* 1*   DORY 10.2*  --   --  10.1 9.8 10.0   *  --   --  128* 150* 132*

## 2020-02-11 ENCOUNTER — APPOINTMENT (OUTPATIENT)
Dept: CT IMAGING | Facility: CLINIC | Age: 65
DRG: 372 | End: 2020-02-11
Attending: INTERNAL MEDICINE
Payer: MEDICARE

## 2020-02-11 LAB
CREAT SERPL-MCNC: 0.51 MG/DL (ref 0.52–1.04)
ERYTHROCYTE [DISTWIDTH] IN BLOOD BY AUTOMATED COUNT: 15.3 % (ref 10–15)
GFR SERPL CREATININE-BSD FRML MDRD: >90 ML/MIN/{1.73_M2}
GLUCOSE BLDC GLUCOMTR-MCNC: 124 MG/DL (ref 70–99)
GLUCOSE BLDC GLUCOMTR-MCNC: 210 MG/DL (ref 70–99)
GLUCOSE SERPL-MCNC: 125 MG/DL (ref 70–99)
HCT VFR BLD AUTO: 29.9 % (ref 35–47)
HGB BLD-MCNC: 8.7 G/DL (ref 11.7–15.7)
MCH RBC QN AUTO: 25.4 PG (ref 26.5–33)
MCHC RBC AUTO-ENTMCNC: 29.1 G/DL (ref 31.5–36.5)
MCV RBC AUTO: 87 FL (ref 78–100)
PLATELET # BLD AUTO: 366 10E9/L (ref 150–450)
RBC # BLD AUTO: 3.42 10E12/L (ref 3.8–5.2)
WBC # BLD AUTO: 11.5 10E9/L (ref 4–11)

## 2020-02-11 PROCEDURE — 94640 AIRWAY INHALATION TREATMENT: CPT | Mod: 76

## 2020-02-11 PROCEDURE — 40000239 ZZH STATISTIC VAT ROUNDS

## 2020-02-11 PROCEDURE — 27210437 ZZH NUTRITION PRODUCT SEMIELEM INTERMED LITER

## 2020-02-11 PROCEDURE — 82947 ASSAY GLUCOSE BLOOD QUANT: CPT | Performed by: HOSPITALIST

## 2020-02-11 PROCEDURE — 94640 AIRWAY INHALATION TREATMENT: CPT

## 2020-02-11 PROCEDURE — 40000275 ZZH STATISTIC RCP TIME EA 10 MIN

## 2020-02-11 PROCEDURE — 25000128 H RX IP 250 OP 636: Performed by: HOSPITALIST

## 2020-02-11 PROCEDURE — 25000125 ZZHC RX 250: Performed by: HOSPITALIST

## 2020-02-11 PROCEDURE — 25000132 ZZH RX MED GY IP 250 OP 250 PS 637: Mod: GY | Performed by: INTERNAL MEDICINE

## 2020-02-11 PROCEDURE — 00000146 ZZHCL STATISTIC GLUCOSE BY METER IP

## 2020-02-11 PROCEDURE — 25000128 H RX IP 250 OP 636: Performed by: INTERNAL MEDICINE

## 2020-02-11 PROCEDURE — 74177 CT ABD & PELVIS W/CONTRAST: CPT

## 2020-02-11 PROCEDURE — 25800030 ZZH RX IP 258 OP 636: Performed by: SURGERY

## 2020-02-11 PROCEDURE — 25000132 ZZH RX MED GY IP 250 OP 250 PS 637: Mod: GY

## 2020-02-11 PROCEDURE — G0463 HOSPITAL OUTPT CLINIC VISIT: HCPCS

## 2020-02-11 PROCEDURE — 25000132 ZZH RX MED GY IP 250 OP 250 PS 637: Mod: GY | Performed by: HOSPITALIST

## 2020-02-11 PROCEDURE — 12000000 ZZH R&B MED SURG/OB

## 2020-02-11 PROCEDURE — 85027 COMPLETE CBC AUTOMATED: CPT | Performed by: HOSPITALIST

## 2020-02-11 PROCEDURE — 99233 SBSQ HOSP IP/OBS HIGH 50: CPT | Performed by: INTERNAL MEDICINE

## 2020-02-11 PROCEDURE — 25800030 ZZH RX IP 258 OP 636: Performed by: INTERNAL MEDICINE

## 2020-02-11 PROCEDURE — 25800030 ZZH RX IP 258 OP 636: Performed by: HOSPITALIST

## 2020-02-11 PROCEDURE — 82565 ASSAY OF CREATININE: CPT | Performed by: HOSPITALIST

## 2020-02-11 RX ORDER — IOPAMIDOL 755 MG/ML
135 INJECTION, SOLUTION INTRAVASCULAR ONCE
Status: COMPLETED | OUTPATIENT
Start: 2020-02-11 | End: 2020-02-11

## 2020-02-11 RX ADMIN — IOPAMIDOL 135 ML: 755 INJECTION, SOLUTION INTRAVENOUS at 14:05

## 2020-02-11 RX ADMIN — PIPERACILLIN AND TAZOBACTAM 3.38 G: 3; .375 INJECTION, POWDER, LYOPHILIZED, FOR SOLUTION INTRAVENOUS at 18:40

## 2020-02-11 RX ADMIN — OXYCODONE HYDROCHLORIDE 20 MG: 15 TABLET ORAL at 12:02

## 2020-02-11 RX ADMIN — OXYCODONE HYDROCHLORIDE 20 MG: 15 TABLET ORAL at 16:43

## 2020-02-11 RX ADMIN — PANTOPRAZOLE SODIUM 40 MG: 40 TABLET, DELAYED RELEASE ORAL at 16:44

## 2020-02-11 RX ADMIN — AMLODIPINE BESYLATE 5 MG: 5 TABLET ORAL at 10:15

## 2020-02-11 RX ADMIN — METHADONE HYDROCHLORIDE 20 MG: 10 TABLET ORAL at 10:15

## 2020-02-11 RX ADMIN — TRAZODONE HYDROCHLORIDE 100 MG: 100 TABLET ORAL at 21:29

## 2020-02-11 RX ADMIN — IPRATROPIUM BROMIDE AND ALBUTEROL SULFATE 3 ML: .5; 3 SOLUTION RESPIRATORY (INHALATION) at 10:59

## 2020-02-11 RX ADMIN — ALBUTEROL SULFATE 2.5 MG: 2.5 SOLUTION RESPIRATORY (INHALATION) at 21:29

## 2020-02-11 RX ADMIN — SODIUM CHLORIDE 80 ML: 9 INJECTION, SOLUTION INTRAVENOUS at 14:06

## 2020-02-11 RX ADMIN — SODIUM CHLORIDE: 9 INJECTION, SOLUTION INTRAVENOUS at 04:38

## 2020-02-11 RX ADMIN — FUROSEMIDE 40 MG: 40 TABLET ORAL at 10:15

## 2020-02-11 RX ADMIN — GABAPENTIN 1600 MG: 800 TABLET, FILM COATED ORAL at 10:16

## 2020-02-11 RX ADMIN — SIMVASTATIN 20 MG: 20 TABLET, FILM COATED ORAL at 21:29

## 2020-02-11 RX ADMIN — VANCOMYCIN HYDROCHLORIDE 2000 MG: 10 INJECTION, POWDER, LYOPHILIZED, FOR SOLUTION INTRAVENOUS at 14:34

## 2020-02-11 RX ADMIN — MICAFUNGIN SODIUM 100 MG: 10 INJECTION, POWDER, LYOPHILIZED, FOR SOLUTION INTRAVENOUS at 14:34

## 2020-02-11 RX ADMIN — PIPERACILLIN AND TAZOBACTAM 3.38 G: 3; .375 INJECTION, POWDER, LYOPHILIZED, FOR SOLUTION INTRAVENOUS at 10:14

## 2020-02-11 RX ADMIN — IPRATROPIUM BROMIDE AND ALBUTEROL SULFATE 3 ML: .5; 3 SOLUTION RESPIRATORY (INHALATION) at 15:10

## 2020-02-11 RX ADMIN — IPRATROPIUM BROMIDE AND ALBUTEROL SULFATE 3 ML: .5; 3 SOLUTION RESPIRATORY (INHALATION) at 07:44

## 2020-02-11 RX ADMIN — OXYCODONE HYDROCHLORIDE 20 MG: 15 TABLET ORAL at 21:29

## 2020-02-11 RX ADMIN — PANTOPRAZOLE SODIUM 40 MG: 40 TABLET, DELAYED RELEASE ORAL at 10:15

## 2020-02-11 RX ADMIN — METHADONE HYDROCHLORIDE 20 MG: 10 TABLET ORAL at 21:29

## 2020-02-11 RX ADMIN — GABAPENTIN 1600 MG: 800 TABLET, FILM COATED ORAL at 21:29

## 2020-02-11 RX ADMIN — ALBUTEROL SULFATE 2.5 MG: 2.5 SOLUTION RESPIRATORY (INHALATION) at 05:28

## 2020-02-11 RX ADMIN — PIPERACILLIN AND TAZOBACTAM 3.38 G: 3; .375 INJECTION, POWDER, LYOPHILIZED, FOR SOLUTION INTRAVENOUS at 04:35

## 2020-02-11 RX ADMIN — GABAPENTIN 1600 MG: 800 TABLET, FILM COATED ORAL at 16:43

## 2020-02-11 RX ADMIN — IPRATROPIUM BROMIDE AND ALBUTEROL SULFATE 3 ML: .5; 3 SOLUTION RESPIRATORY (INHALATION) at 19:15

## 2020-02-11 RX ADMIN — OXYCODONE HYDROCHLORIDE 20 MG: 15 TABLET ORAL at 04:34

## 2020-02-11 RX ADMIN — INSULIN ASPART 2 UNITS: 100 INJECTION, SOLUTION INTRAVENOUS; SUBCUTANEOUS at 19:58

## 2020-02-11 RX ADMIN — LEVOTHYROXINE SODIUM 88 MCG: 88 TABLET ORAL at 10:16

## 2020-02-11 RX ADMIN — DULOXETINE HYDROCHLORIDE 120 MG: 60 CAPSULE, DELAYED RELEASE ORAL at 10:16

## 2020-02-11 RX ADMIN — METHADONE HYDROCHLORIDE 10 MG: 10 TABLET ORAL at 14:35

## 2020-02-11 ASSESSMENT — MIFFLIN-ST. JEOR: SCORE: 1915.88

## 2020-02-11 ASSESSMENT — ACTIVITIES OF DAILY LIVING (ADL)
ADLS_ACUITY_SCORE: 26

## 2020-02-11 NOTE — PROGRESS NOTES
"Per discharging provider the patient is most likely ready for discharge to TCU to the [facility] tomorrow pending CT and final ID recommendations.  Bedside RN gave update on drains/dressings and patients request that she have a \"special mattress\" with TCU.  Patient is currently on a pulsate here.    Writer paged MD as ID was recommending a repeat CT for further discharge antibiotic recommendations.  Order for CT is now in [facility].  Writer met with Nutrition services and they will put tube feeding orders in for discharge.  Writer also talked to WOC Rand and she will see the patient today to give updated wound care and dressing recommendations for TCU.    SW aware of the above and has placed a call to the [facility] for discharge planning as the patient's bed hold expires 2/12.  Need to determine if they are able to take the patient back with current cares and any other recommendations from ID/WOC/Surgery etc.       "

## 2020-02-11 NOTE — PLAN OF CARE
Pt is A&O, up with lift, refused to have LE on pillows, SP cath, JPx2 with tan milky output, malecot drain to the duodenum with thick brown output, venting G tube, J tube for feedings, packed midline abd wound, BGM Q4hrs, sacral meplex to coccyx and left thigh, PRN oxycodone given x1, 2L O2, COOK, LS diminished in the bases. Plan unclear at this time, pending clinical improvement; nursing will continue to monitor.

## 2020-02-11 NOTE — PLAN OF CARE
A+O x 4, green on the traffic light aggression tool. Excoriated on the bottom and leg skin folds into the labia of the vagina. Barrier cream applied. Refused glucose checks and insulin during the night. Using the lift to move and readjust her. Pain controlled with 20mg of oxy, q 4 hr. PICC is flushing and drawing. Full liquid diet. Feeding tube running at 60/hr in J-tube. Has drains: G-tube, Malecot, Suprapubic catheter. Dressings CDI.

## 2020-02-11 NOTE — PROGRESS NOTES
[facility] Nurse Inpatient Wound Assessment     Reason for consultation: Evaluate and treat:  Bilateral posterior upper thighs/ gluteal folds/coccyx and sacrum    Assessment  Sacrum, coccyx, gluteal cleft basically intact.  Posterior bilateral upper thighs/ gluteal folds with significant amount of loose skin from large weight loss.  This significantly loose tissue is rubbing, shearing, pressing on itself and causing areas of superficial blisters that are clean, no s/s of infection but can be very painful, currently the right side is very tender.  Skin improving slowly.  Scattered new areas of ecchymosis present on this admission with skin intact.     Pt with runny stools that track up into vaginal area and surrounding perineal area.  Pt needs meticulous pericares.     Bariatric Pulsate in place for additional off-loading.    Treatment Plan  Will discontinue Sacral Mepilex to posterior thighs for now, as skin improving and pt on air mattress.     Perineal cares:  BID and prn with incontinence:   1.  Cleanse all areas (perineal, posterior thighs, groin, gluteal folds) with generous amounts Eduin perineal lotion and soft dry wipes.  Really rub in a lot of Eduin with your gloves to the flaky/peely skin on posterior thighs. (The Eduin will help soften, condition, and protect the skin)  2.  Ensure all folds including labia/vagina are completely cleansed of all stool residue.   3.  Apply antifungal powder to groin/pannus folds, rub in well.  4.  Apply thin glaze of barrier paste to perianal area, inner buttocks, and reddened/raw areas of posterior thighs.   5.  Leave everything open to air for now.  If new/frequent shearing occurs to thighs, can reconsider Mepilex prn.     PIP:    1. Moisture: Meticulous perineal cares  2. Position: side to side only, every 2 hours, even when on a low air loss mattress  3. Keep heels elevated at all times   4. Attempt to lower HOB to 30 degrees for additional  off-loading  5. Nutrition following   6. Mobilize pt when medically indicated   7. SP for UIC and incontinence protocol for FIC    Orders updated  WOC Nurse follow-up plan: weekly   Nursing to notify the Provider(s) and re-consult the WOC Nurse if wound(s) deteriorates or new skin concern.    Patient History  According to provider note(s):  Bhavani White is a 64 year old female with hx of DM2, chronic cystitis s/p chronic SP catheter, chronic pain on methadone maintenance, asthma, ANIYA, and hypothyroidism who was admitted to the ICU service on 12/29/2019 for a perforated duodenal ulcer with associated septic shock and bowel ischemia. She is s/p ex-lap x2, but perforation could not be entirely repaired due to size; she is s/p THAI drains and GJ tube placement. Hospital course has otherwise been complicated by stress cardiomyopathy. She was weaned off pressors on 1/5 and successfully extubated on 1/6. She was transferred the the Hospitalist service on 1/6.      Acute metabolic encephalopathy, Improving  Multifactorial due to hemorrhagic/septic shock, ICU delirium. She was started on scheduled Seroquel while in the ICU which was discontinued with improvement in her mental status   - Currently alert and oriented  - Seroquel 25 mg TID PRN for anxiety, agitation     Perforated duodenal ulcer with hemorrhagic shock and bowel ischemia, s/p ex lap, TRUONG, Malecot drain placement in perforation, and wound VAC on 12/29/2019, repeat ex-lap, TRUONG, closure of duodenal perforation over Malecot, G-tube/J-tube placements, and delayed primary closure on 1/1/2020    Re-admit 1-  Pt with complicated abdominal process.  Admitted with abdomen pain and purulent drainage from around G-tube  CT shows two large abscess cavities  We will have IR drain these collections, okay to wait until 1/27  Continue with G-tube to gravity/suction  Daily dressing changes and re-packing of abdomen wound   May resume J-tube feeds  No plans for repeat  surgery  Art Willingham MD    Objective Data  Containment of urine/stool: SP for UIC and Incontinence protocol for FIC    Active Diet Order: TF to resume when medically indicated  Orders Placed This Encounter      Full Liquid Diet    Output:   I/O last 3 completed shifts:  In: 2309 [P.O.:730; NG/GT:1579]  Out: 6875 [Urine:6125; Emesis/NG output:750]    Risk Assessment:   Sensory Perception: 3-->slightly limited  Moisture: 3-->occasionally moist  Activity: 1-->bedfast  Mobility: 2-->very limited  Nutrition: 2-->probably inadequate  Friction and Shear: 1-->problem  Joe Score: 12                          Labs:   Recent Labs   Lab 02/11/20  0620   HGB 8.7*   WBC 11.5*       Physical Exam  Skin inspection: Coccyx/sacrum and bilateral  posterior thighs  2-11-20 perineal/thighs (pt continuously oozing stool during assessment/photo)      1-27-29 Bilateral posterior thighs      Lt buttock and coccyx: Blanchable erythema with scattered spots of non-blanchable ecchymosis. All skin intact but scattered scar tissue    Bilateral posterior upper thighs with slightly moist, popped blisters, loose epidermis that is dry, devitalized skin flaps.  Each side is about 5 x 3cm, the right is more painful  Lt Posterior thigh with 2 x 1cm previous non-blanchable ecchymosis with skin intact  Pt had small brown runny soft BM     Interventions  Current support surface Bariatric pulsate   Current off-loading measures: pillows / chair cushion  Visual inspection of wound(s) completed  Wound Care: Meplilex sacral in sacrum and bilateral posterior thighs for prevention  Supplies: In pt room   Education provided: Discussed importance of repositioning and HOB below 30 degrees if medically indicated

## 2020-02-11 NOTE — PROGRESS NOTES
[facility]    Infectious Disease Progress Note    Date of Service (when I saw the patient): 02/11/2020     Assessment & Plan   Bhavani White is a 64 year old female who was admitted on 1/25/2020.       Impression:  1. 64 y.o who was recently admitted to the hospital was discharged on 21 January, that was a prolonged stay for perforated duodenal ulcer, s/p ex lap, TRUONG, Malecot drain placement with perforation, and wound VAC on 12/29/2019. Repeat ex-lap, TRUONG, closure of duodenal perforation over Malecot, G-tube/J-tube placements, delayed primary closure on 1/1/2020.  2. Admitted this occasion with abdominal pain, drainage from the G tube site.   3. CT scan with two large abscess cavities.   4. S/P multiple drain placement.   5. 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   6. She is on IV vancomycin and zosyn and micafungin   7. Diabetes.      Recommendations:   Continue zosyn for the: enterococcus, strep, h parainfluenza and also since intraabdominal abscesses for possible anaerobes which might be involved but not growing.   Continue on Vancomycin for the MRSA from the cultures.   On micafungin for the candida glabrata, can be switched to oral fluc high dose when ready for discharge.   CT scan planned for today,  Drains removed, no residual fluid in the abdomen, probably not much antibiotics to go, will follow up tomorrow.       Chacho Solis MD    Interval History   Feels tiered   Had some difficulty breathing, now improved   No fever     Physical Exam   Temp: 95.9  F (35.5  C) Temp src: Axillary BP: 122/64 Pulse: 77 Heart Rate: 78 Resp: 16 SpO2: 98 % O2 Device: Nasal cannula Oxygen Delivery: 2 LPM  Vitals:    01/31/20 0610 02/07/20 0654 02/11/20 0557   Weight: 134.7 kg (297 lb) 135.5 kg (298 lb 12.8 oz) 136 kg (299 lb 13.2 oz)     Vital Signs with Ranges  Temp:  [95.8  F (35.4  C)-96.3  F (35.7  C)] 95.9  F (35.5  C)  Pulse:   [77] 77  Heart Rate:  [68-78] 78  Resp:  [16] 16  BP: (102-128)/(55-64) 122/64  SpO2:  [96 %-99 %] 98 %    Constitutional: Awake, alert, cooperative, no apparent distress  Lungs: Clear to auscultation bilaterally, no crackles or wheezing  Cardiovascular: Regular rate and rhythm, normal S1 and S2, and no murmur noted  Abdomen: multiple drains   Skin: No rashes, no cyanosis, no edema  Other:    Medications     dextrose       sodium chloride 20 mL/hr at 02/11/20 0438       amLODIPine  5 mg Oral or Feeding Tube Daily     DULoxetine  120 mg Oral QAM     furosemide  40 mg Oral Daily     gabapentin  1,600 mg Oral or Feeding Tube TID     insulin aspart  1-6 Units Subcutaneous Q4H     insulin glargine  27 Units Subcutaneous QAM AC     ipratropium - albuterol 0.5 mg/2.5 mg/3 mL  3 mL Nebulization 4x daily     levothyroxine  88 mcg Oral or Feeding Tube QAM AC     methadone  10 mg Oral Daily at 4 pm     methadone  20 mg Oral or Feeding Tube BID    Or     methadone  20 mg Oral BID     micafungin  100 mg Intravenous Q24H     pantoprazole  40 mg Oral BID AC    Or     pantoprazole  40 mg Oral or Feeding Tube BID AC     piperacillin-tazobactam  3.375 g Intravenous Q6H     polyethylene glycol  17 g Oral Daily     sennosides  2 tablet Oral BID     simvastatin  20 mg Oral or Feeding Tube At Bedtime     sodium chloride (PF)  10 mL Intracatheter Q8H     sodium chloride (PF)  10 mL Irrigation Q8H     sodium chloride (PF)  3 mL Intracatheter Q8H     traZODone  100 mg Oral At Bedtime     vancomycin (VANCOCIN) IV  2,000 mg Intravenous Q24H       Data   All microbiology laboratory data reviewed.  Recent Labs   Lab Test 02/11/20  0620 02/10/20  0500 02/07/20  0610   WBC 11.5* 12.9* 14.4*   HGB 8.7* 9.0* 9.2*   HCT 29.9* 29.9* 30.6*   MCV 87 87 88    342 409     Recent Labs   Lab Test 02/11/20  0620 02/10/20  0500 02/09/20  0620   CR 0.51* 0.50* 0.48*     No lab results found.  Recent Labs   Lab Test 01/27/20  1035 01/27/20  1000  01/25/20  2336 01/25/20  2241 01/04/20  1455 01/02/20  1233 12/30/19  0425 12/29/19  1507 12/29/19  1435   CULT No anaerobes isolated  Moderate growth  Streptococcus mitis group  Susceptibility testing not routinely done  *  Moderate growth  Haemophilus parainfluenzae  Susceptibility testing not routinely done  *  On day 1, isolated in broth only:  Candida glabrata complex  Susceptibility testing not routinely done  * No anaerobes isolated  Moderate growth  Streptococcus mitis group  Susceptibility testing not routinely done  *  Moderate growth  Haemophilus parainfluenzae  Susceptibility testing not routinely done  *  Light growth  Enterococcus faecalis  *  On day 1, isolated in broth only:  Candida glabrata complex  Susceptibility testing not routinely done  *  Susceptibility testing requested by  Dr Cardenas, [phone],  would like routine sens done on C. Glabrata. 1.31.20 1007. BENITO   No growth  50,000 to 100,000 colonies/mL  Pseudomonas aeruginosa  * No growth  Heavy growth  Methicillin resistant Staphylococcus aureus (MRSA)  *  Heavy growth  Streptococcus mitis group  Susceptibility testing not routinely done  *  Heavy growth  Haemophilus parainfluenzae  Susceptibility testing not routinely done  * No growth Cultured on the 1st day of incubation:  Staphylococcus hominis  *  Critical Value/Significant Value, preliminary result only, called to and read back by  Sangeeta Taylor RN [facility] 1.3.20 @6614 AE    Susceptibility testing done on previous specimen Methicillin resistant Staphylococcus aureus (MRSA) isolated* No growth Cultured on the 1st day of incubation:  Staphylococcus hominis  *  Critical Value/Significant Value, preliminary result only, called to and read back by  Suhail Escobar RN from [facility] ICU. 12/30/19 at 1202.TV.    Cultured on the 1st day of incubation:  Staphylococcus epidermidis  *  (Note)  POSITIVE for Staphylococci other than S.aureus, S.epidermidis and  S.lugdunensis, by  [employer] multiplex nucleic acid test.  Coagulase-negative staphylococci are the most common venipuncture or  collection associated skin CONTAMINANTS grown in blood cultures.  Final identification and antimicrobial susceptibility testing will be  verified by standard methods.    Specimen tested with [employer]igene multiplex, gram-positive blood culture  nucleic acid test for the following targets: Staph aureus, Staph  epidermidis, Staph lugdunensis, other Staph species, Enterococcus  faecalis, Enterococcus faecium, Streptococcus species, S. agalactiae,  S. anginosus grp., S. pneumoniae, S. pyogenes, Listeria sp., mecA  (methicillin resistance) and Nick/B (vancomycin resistance).    Critical Value/Significant Value called to and read back by FANNY SAMANO RN 12/30/2019 @ 3036 BC         Attestation:  Total time on the floor involved in the patient's care: 35 minutes. Total time spent in counseling/care coordination: >50%

## 2020-02-11 NOTE — PROGRESS NOTES
Surgery    Feels much better with drain out. Brandon fulls. No distention or pain today.    Abd- Soft. Malecotstable. Wound stable.    A/P  Doing better. Drains out. Continue current care. TCU when medically clear.     Ayan Lopez M.D.  [facility]  [phone]

## 2020-02-11 NOTE — DISCHARGE INSTRUCTIONS
"Plan of care open abdominal incision: change dressing Daily   1. Clean incision with MicroKlenz  2. Cut approx 12\" of 1/2\" plain Iodoform packing gauze  3. Dampen with VASHE. (blue bottle) Squeeze out excess  4. Using Q-tip pack into tunnel @ noon and pack moving top to bottom of wound bed  5. Cover with 4 x 4 folded in half  6. Secure with Medipore tape      All tube site: change on odd days and prn  1. Gently clean with MicroKlenz  2. Apply drain sponges to each tube without a stabilizer  3. Secure with Medipore tape    TF via jejunostomy:  Peptamen Intense VHP @ 60 mL/hr continuous.  60 mL water flushes every 4 hrs  "

## 2020-02-11 NOTE — PROGRESS NOTES
[facility]  WO Nurse Inpatient Wound Assessment   Reason for consultation: Evaluate and treat: Abdominal Incision    Assessment  Open abd incision: Increase in granulation tissue and autolytic debridement of slough.  Sutures visible in base of wound bed.  No purulent drainage.  Pt to undergo another CT scan today.  Will consider placing wound vac tomorrow if all is stable, and if can be managed at her facility on discharge.  For now will continue Vashe-damp gauze to wound, seems helping clean up wound bed nicely.     Drain sites with obed-tube skin irritation.  Will increase dressing changes to daily.    Treatment Plan  Open abdominal incision: change dressing Daily   1. Dampen a single gauze fluff with VASHE. (blue bottle, # [id number]) Squeeze out excess.  2. Unfold the fluff and pack it along the wound, starting with small tunnel at 12 o'clock.   3. Cover with dry gauze and Medipore tape.  Label time/date/initials.     All tube sites: change Daily  1. Gently clean with MicroKlenz or saline.  Ensure any old or crusty drainage is thoroughly cleansed away, from the skin and from the tubing/bumpers.    2. If obed-tube skin becoming raw/irritated: swab with Cavilon, let dry.  3. Apply drain sponges to each tube site.   4. Secure with Medipore tape.  Label time/date/initials.     Orders updated  WO Nurse follow-up plan: tomorrow prn  Nursing to notify the Provider(s) and re-consult the WO Nurse if wound(s) deteriorates or new skin concern.    Patient History  According to provider note(s):  Bhavani White is a 64 year old female with hx of DM2, chronic cystitis s/p chronic SP catheter, chronic pain on methadone maintenance, asthma, ANIYA, and hypothyroidism who was admitted to the ICU service on 12/29/2019 for a perforated duodenal ulcer with associated septic shock and bowel ischemia. She is s/p ex-lap x2, but perforation could not be entirely repaired due to size; she is s/p THAI drains and GJ tube  placement. Hospital course has otherwise been complicated by stress cardiomyopathy. She was weaned off pressors on 1/5 and successfully extubated on 1/6. She was transferred the the Hospitalist service on 1/6.      Acute metabolic encephalopathy, Improving  Multifactorial due to hemorrhagic/septic shock, ICU delirium. She was started on scheduled Seroquel while in the ICU which was discontinued with improvement in her mental status   - Currently alert and oriented  - Seroquel 25 mg TID PRN for anxiety, agitation     Perforated duodenal ulcer with hemorrhagic shock and bowel ischemia, s/p ex lap, TRUONG, Malecot drain placement in perforation, and wound VAC on 12/29/2019, repeat ex-lap, TRUONG, closure of duodenal perforation over Malecot, G-tube/J-tube placements, and delayed primary closure on 1/1/2020    Re-admit 1-  Pt with complicated abdominal process.  Admitted with abdomen pain and purulent drainage from around G-tube  CT shows two large abscess cavities  We will have IR drain these collections, okay to wait until 1/27  Continue with G-tube to gravity/suction  Daily dressing changes and re-packing of abdomen wound   May resume J-tube feeds  No plans for repeat surgery  Art Willingham MD    1-27-20 Esther Bonilla PA-C excised skin bridges @ bedside. See surgery note    Objective Data  Containment of urine/stool: SP for UIC and Incontinence protocol for [facility]    Active Diet Order: TF to resume when medically indicated  Orders Placed This Encounter      Full Liquid Diet    Output:   I/O last 3 completed shifts:  In: 2309 [P.O.:730; NG/GT:1579]  Out: 6875 [Urine:6125; Emesis/NG output:750]    Risk Assessment:   Sensory Perception: 3-->slightly limited  Moisture: 3-->occasionally moist  Activity: 1-->bedfast  Mobility: 2-->very limited  Nutrition: 2-->probably inadequate  Friction and Shear: 1-->problem  Joe Score: 12                          Labs:   Recent Labs   Lab 02/11/20  0620   HGB 8.7*   WBC 11.5*        Physical Exam  Abdominal Inspection:     Rotund and distended, with various areas of mild firmness.  Incision open with sutures visible in wound bed  G tube venting, J tube feeds going, Malecot bilious, THAI drain serous, IR drains removed    1-27-29: Esther Bonilla excised skin bridges @ bedside. See surgery note    2-11-20 abdomen        [facility] Photo: 1-27-29 abdomen with drains      [facility] Photo: 1-31-20 Abd incision        Size:  11.5 x 3.5 x 0.4+cm, 60-70% pink/red base, rest is yellow fat/slough, with scattered blue sutures visible @ base  Tunneling: approx 2cm @ 12 o'clock, what is visible appears sloughy tan/grey  Drainage: small to moderate serosang to light tan  Odor: none  Pain: along edges of wound; drain sites tender    Drains:  Malecot appears migrated out a few cm from previous photo, is pulling at the skin suture and is uncomfortable in general for pt, but still draining into Lundberg bag.  Mild red irritation to immediate obed-tube skin to all drain sites.     Interventions  Current support surface: Bariatric pulsate   Current off-loading measures: pillows / chair cushion  Visual inspection of wound(s) completed  Wound Care: Vashe packing to opened incision. No change to current plan today but will consider wound vac for discharge  Supplies: In pt room   Education provided: Discussed importance of repositioning and HOB below 30 degrees if medically indicated

## 2020-02-11 NOTE — PROGRESS NOTES
[facility]    Hospitalist Progress Note    Date of Service (when I saw the patient): 02/11/2020    Assessment & Plan   Bhavani White is a pleasant 64 year old woman who was admitted on 1/25/2020 for evaluation of abdominal pain and increased drainage from G tube site.  Current problems include:     Complex anterior upper abdominal fluid collections, with recent discharge on January 21, 2020   - on 12/29/2019 had: perforated duodenal ulcer, s/p ex lap, TRUONG, Malecot drain placement with perforation, and wound VAC placement on 12/29/2019.   - on 1/1/20 had: Repeat ex-lap, TRUONG, closure of duodenal perforation over Malecot, G-tube/J-tube placements  - General Surgery following; underwent additional drain placements 1/27  -Her diet has been advanced to full liquid and she is tolerating it.  - continue IV Vancomycin and Zosyn. Micafungin added 1/3 (for C. Glabrata)  - appreciate ID input, will need to decide on discharge antibiotics, CT abdomen and pelvis ordered for further clarification, patient is ready for discharge to TCU, possibly tomorrow once antibiotics are decided.  - transitioning pain control from dilaudid PCA to orals.   --Weaned off of PCA   -- back on PTA methadone dosing (re-added mid day 10mg dose 2/8)   --Started on oxycodone 20mg po Q4h prn   -- will have 1mg IV dilaudid available prn for dressing changes, cleaning, and transfers.  -2 drains were removed on 2/10  Vaginal irritation:  Doubt yeast infection given IV micafungin. More likely pt has irritation from occasional stool and poor hygiene.   - suggested attention to cleaning area with barrier cream or ointment.    Lines/access:  - Patient had a new PICC line placed 1/31.  Her J-tube was pulled on 2/3, and was replaced on 2/4 by IR    Acute on chronic constipation; resolved.  Currently having loose stools.  - continue current bowel meds     Dyslipidemia  - continue simvastatin     Hypertension; pressures stable  - continue  amlodipine    Hypoxia, with some dyspnea with exertion.  Bilateral pleural effusions noted in recent imaging studies.  Stable over the past 1 or more weeks.  Bhavani says she was not on 02 at home, however.  - CXR 2/5 indicated bilateral pleural effusions as previous  - continue daily lasix. Pt has noticeably lost fluid weight since a week ago.  - encourage IS     Diabetes mellitus, insulin-dependent: was on Lantus prior to admission. Good glycemic control here.  - continue prior to admission Lantus 27 units Q a.m.  - Insulin sliding scale, medium.     Hypothyroidism: Stable.  - continue prior to admission levothyroxine      Chronic pain; stable.  - continue methadone, cymbalta  - continue gabapentin    Depression with anxiety; stable.  - continue cymbalta     GERD: Stable.  - continue PPI     Insomnia: Stable.  - continue prior to admission trazodone      DVT Prophylaxis: Pneumatic Compression Devices and Ambulate every shift  Code Status: Full Code    Disposition: Expected discharge: 2/12 to TCU depending on ID input.      Grisel Chicas MD,FACP    hospitalist     Interval History   2 drains were removed by IR yesterday, patient is feeling better, tolerated full liquid diet, she is passing gas, no new complaints, pain well controlled.  We briefly discussed about getting a repeat CT as per infectious disease input.  Data reviewed today: I reviewed all new labs and imaging results over the last 24 hours.    Physical Exam   Temp: 95.9  F (35.5  C) Temp src: Axillary BP: 122/64 Pulse: 77 Heart Rate: 78 Resp: 16 SpO2: 98 % O2 Device: Nasal cannula Oxygen Delivery: 2 LPM  Vitals:    01/31/20 0610 02/07/20 0654 02/11/20 0557   Weight: 134.7 kg (297 lb) 135.5 kg (298 lb 12.8 oz) 136 kg (299 lb 13.2 oz)     Vital Signs with Ranges  Temp:  [95.8  F (35.4  C)-96.3  F (35.7  C)] 95.9  F (35.5  C)  Pulse:  [77] 77  Heart Rate:  [68-78] 78  Resp:  [16] 16  BP: (102-128)/(55-64) 122/64  SpO2:  [96 %-99 %] 98 %  I/O last 3  completed shifts:  In: 2059 [P.O.:480; NG/GT:1579]  Out: 4781 [Urine:4025; Emesis/NG output:750; Drains:6]    Constitutional: awake, no apparent distress; lying in bed  HEENT: sclerae clear; MM's moist; n/c in nares  Respiratory: fair-poor a/e bilaterally; decreased at bases; no wheezing or rhonchi  Cardiovascular: Regular rate and rhythm, S1, S2 noted; no m/r/g  GI: abdomen obese; patient does have a G-tube, J port for right tube feeds and G-tube was for gravity earlier, 2 drains removed.  Skin/Integumen: no rashes, no cyanosis, no jaundice; wounds/surgical incisions covered by dressings  Musculoskeletal: 1+ lower extremity edema noted.  Neurologic: follows directions well; no focal deficits      Medications     dextrose       sodium chloride 20 mL/hr at 02/11/20 0438       amLODIPine  5 mg Oral or Feeding Tube Daily     DULoxetine  120 mg Oral QAM     furosemide  40 mg Oral Daily     gabapentin  1,600 mg Oral or Feeding Tube TID     insulin aspart  1-6 Units Subcutaneous Q4H     insulin glargine  27 Units Subcutaneous QAM AC     ipratropium - albuterol 0.5 mg/2.5 mg/3 mL  3 mL Nebulization 4x daily     levothyroxine  88 mcg Oral or Feeding Tube QAM AC     methadone  10 mg Oral Daily at 4 pm     methadone  20 mg Oral or Feeding Tube BID    Or     methadone  20 mg Oral BID     micafungin  100 mg Intravenous Q24H     pantoprazole  40 mg Oral BID AC    Or     pantoprazole  40 mg Oral or Feeding Tube BID AC     piperacillin-tazobactam  3.375 g Intravenous Q6H     polyethylene glycol  17 g Oral Daily     sennosides  2 tablet Oral BID     simvastatin  20 mg Oral or Feeding Tube At Bedtime     sodium chloride (PF)  10 mL Intracatheter Q8H     sodium chloride (PF)  10 mL Irrigation Q8H     sodium chloride (PF)  3 mL Intracatheter Q8H     traZODone  100 mg Oral At Bedtime     vancomycin (VANCOCIN) IV  2,000 mg Intravenous Q24H       Data   Recent Labs   Lab 02/11/20  0620 02/10/20  0500 02/09/20  0620  02/07/20  0610  02/06/20  0600   WBC 11.5* 12.9*  --   --  14.4*  --    HGB 8.7* 9.0*  --   --  9.2*  --    MCV 87 87  --   --  88  --     342  --   --  409  --    NA  --  136  --   --  138 138   POTASSIUM  --  3.6  --   --  3.7 3.6   CHLORIDE  --  94  --   --  94 93*   CO2  --  41*  --   --  44* 43*   BUN  --  23  --   --  19 17   CR 0.51* 0.50* 0.48*   < > 0.51* 0.48*   ANIONGAP  --  1*  --   --  <1* 2*   DORY  --  10.2*  --   --  10.1 9.8   * 129*  --   --  128* 150*    < > = values in this interval not displayed.

## 2020-02-11 NOTE — PLAN OF CARE
Primary Diagnosis: Abd Abscess, hx duodenal perforation  Orientation: A/Ox4  Aggression Stop Light: Green  Mobility: total/ lift; T/R q2h  Pain Management: scheduled methadone, gabapentin, PRN oxycodone  Diet: Full Liquids, continuous tube feed @ 60 ml/hr w/Q4 hr 60ml water flush  Bowel/Bladder: Incont of stool, SP cath  Abnormal Lab/Assessments: BG checks Q4 hr  Drain/Device/Wound: suprapubic cath, Malecot drain, G tube to gravity, J tube for feeding, R PICC, R PIV SL  Consults: ID/surgery  D/C Day/Goals/Place: Pending clinical improvement     Shift Note: A/Ox 4. VSS on 2L NC. Assist of 2 with lift; T/R q2h. Full liquid diet. C/o back pain, decreased with scheduled methadone and gabapentin, PRN oxycodone. Lung sounds diminished. Suprapubic cath. BS active, + flatus, incontinent stool. Malecot drain to the duodenum with thick brown output, venting G tube, J tube for feeding at 60/hr with 60 mL flush. Skin: midline abd dressing CDI, mepilex to coccyx, mepilex to L thigh. RUE triple lumen PICC. PIV SL. Plan for discharge tbd. Continue to monitor.

## 2020-02-11 NOTE — PROGRESS NOTES
Pt states she does not want to be woken up overnight. Confirmed with pt that she will be refusing BS checks, vitals, and repositioning overnight.

## 2020-02-11 NOTE — PROGRESS NOTES
SW:  Discharge Planner   Discharge Plans in progress: Patient to discharge back to [facility] where she has a bed hold   Barriers to discharge plan: discharge readiness prior to bed hold expiring  Follow up plan:   CHANELLE spoke to Farzana Maher Liaison [phone] who informed that patient's bed hold will  at midnight on .   CHANELLE informed Ade that patient may need additional imaging. When d/c'ing will need:  Specialty mattress  Malecot drain  G & J Tubes for tube feeding  PICC for IV AB's  WOC pending    Ade doranested discharge paperwork 2 hours prior to return.    CHANELLE to continue to follow and assist with discharge planning.    SHIMON Duran  Daytime (8:00am-4:30pm): [phone]  After-Hours SW Pager (4:30pm-11:30pm): [phone]            Entered by: Neva Mackey 2020 11:36 AM

## 2020-02-11 NOTE — PROVIDER NOTIFICATION
MD Notification    Notified Person: MD    Notified Person Name:CASI Chicas    Notification Date/Time: 2/11/2020 11:16 a.m.    Notification Interaction: FYI page per ID they continue to recommend repeat CT imaging. Per SW the patient's bed hold expires tomorrow.  Asking for direction on the CT for discharge planning.     Purpose of Notification: FYI page regarding discharge planning for TCU.    Orders Received: MD to order CT today hopeful that discharge can still occur tomorrow. SW in touch with TCU for discharge planning.    Comments:

## 2020-02-12 VITALS
BODY MASS INDEX: 48.82 KG/M2 | HEART RATE: 74 BPM | WEIGHT: 293 LBS | HEIGHT: 65 IN | RESPIRATION RATE: 16 BRPM | DIASTOLIC BLOOD PRESSURE: 67 MMHG | SYSTOLIC BLOOD PRESSURE: 116 MMHG | TEMPERATURE: 96.1 F | OXYGEN SATURATION: 97 %

## 2020-02-12 LAB
BACTERIA SPEC CULT: ABNORMAL
CREAT SERPL-MCNC: 0.53 MG/DL (ref 0.52–1.04)
ERYTHROCYTE [DISTWIDTH] IN BLOOD BY AUTOMATED COUNT: 15.5 % (ref 10–15)
GFR SERPL CREATININE-BSD FRML MDRD: >90 ML/MIN/{1.73_M2}
GLUCOSE BLDC GLUCOMTR-MCNC: 118 MG/DL (ref 70–99)
GLUCOSE BLDC GLUCOMTR-MCNC: 121 MG/DL (ref 70–99)
GLUCOSE BLDC GLUCOMTR-MCNC: 138 MG/DL (ref 70–99)
GLUCOSE SERPL-MCNC: 137 MG/DL (ref 70–99)
HCT VFR BLD AUTO: 29.6 % (ref 35–47)
HGB BLD-MCNC: 8.7 G/DL (ref 11.7–15.7)
MCH RBC QN AUTO: 25.4 PG (ref 26.5–33)
MCHC RBC AUTO-ENTMCNC: 29.4 G/DL (ref 31.5–36.5)
MCV RBC AUTO: 87 FL (ref 78–100)
PLATELET # BLD AUTO: 339 10E9/L (ref 150–450)
RBC # BLD AUTO: 3.42 10E12/L (ref 3.8–5.2)
SPECIMEN SOURCE: ABNORMAL
SPECIMEN SOURCE: ABNORMAL
VANCOMYCIN SERPL-MCNC: 15.4 MG/L
WBC # BLD AUTO: 11 10E9/L (ref 4–11)

## 2020-02-12 PROCEDURE — 36415 COLL VENOUS BLD VENIPUNCTURE: CPT | Performed by: INTERNAL MEDICINE

## 2020-02-12 PROCEDURE — 82947 ASSAY GLUCOSE BLOOD QUANT: CPT | Performed by: INTERNAL MEDICINE

## 2020-02-12 PROCEDURE — 40000239 ZZH STATISTIC VAT ROUNDS

## 2020-02-12 PROCEDURE — 80202 ASSAY OF VANCOMYCIN: CPT | Performed by: INTERNAL MEDICINE

## 2020-02-12 PROCEDURE — 25000125 ZZHC RX 250: Performed by: HOSPITALIST

## 2020-02-12 PROCEDURE — 40000275 ZZH STATISTIC RCP TIME EA 10 MIN

## 2020-02-12 PROCEDURE — 40000257 ZZH STATISTIC CONSULT NO CHARGE VASC ACCESS

## 2020-02-12 PROCEDURE — 25000132 ZZH RX MED GY IP 250 OP 250 PS 637: Mod: GY | Performed by: HOSPITALIST

## 2020-02-12 PROCEDURE — 99239 HOSP IP/OBS DSCHRG MGMT >30: CPT | Performed by: INTERNAL MEDICINE

## 2020-02-12 PROCEDURE — 94640 AIRWAY INHALATION TREATMENT: CPT

## 2020-02-12 PROCEDURE — 82565 ASSAY OF CREATININE: CPT | Performed by: INTERNAL MEDICINE

## 2020-02-12 PROCEDURE — 25000132 ZZH RX MED GY IP 250 OP 250 PS 637: Mod: GY | Performed by: INTERNAL MEDICINE

## 2020-02-12 PROCEDURE — 25000128 H RX IP 250 OP 636: Performed by: INTERNAL MEDICINE

## 2020-02-12 PROCEDURE — 00000146 ZZHCL STATISTIC GLUCOSE BY METER IP

## 2020-02-12 PROCEDURE — 25800030 ZZH RX IP 258 OP 636: Performed by: INTERNAL MEDICINE

## 2020-02-12 PROCEDURE — 94640 AIRWAY INHALATION TREATMENT: CPT | Mod: 76

## 2020-02-12 PROCEDURE — 85027 COMPLETE CBC AUTOMATED: CPT | Performed by: INTERNAL MEDICINE

## 2020-02-12 PROCEDURE — 25000132 ZZH RX MED GY IP 250 OP 250 PS 637: Mod: GY

## 2020-02-12 RX ORDER — ONDANSETRON 4 MG/1
4 TABLET, ORALLY DISINTEGRATING ORAL EVERY 6 HOURS PRN
DISCHARGE
Start: 2020-02-12 | End: 2023-01-01

## 2020-02-12 RX ORDER — METHADONE HYDROCHLORIDE 10 MG/1
20 TABLET ORAL 2 TIMES DAILY
Qty: 20 TABLET | Refills: 0 | Status: ON HOLD | OUTPATIENT
Start: 2020-02-12 | End: 2020-03-16

## 2020-02-12 RX ORDER — IPRATROPIUM BROMIDE AND ALBUTEROL SULFATE 2.5; .5 MG/3ML; MG/3ML
3 SOLUTION RESPIRATORY (INHALATION) 4 TIMES DAILY
DISCHARGE
Start: 2020-02-12 | End: 2020-03-13

## 2020-02-12 RX ORDER — OXYCODONE HYDROCHLORIDE 20 MG/1
20 TABLET ORAL EVERY 4 HOURS PRN
Qty: 20 TABLET | Refills: 0 | Status: SHIPPED | OUTPATIENT
Start: 2020-02-12 | End: 2020-03-13

## 2020-02-12 RX ADMIN — PANTOPRAZOLE SODIUM 40 MG: 40 TABLET, DELAYED RELEASE ORAL at 06:33

## 2020-02-12 RX ADMIN — DULOXETINE HYDROCHLORIDE 120 MG: 60 CAPSULE, DELAYED RELEASE ORAL at 10:44

## 2020-02-12 RX ADMIN — AMLODIPINE BESYLATE 5 MG: 5 TABLET ORAL at 10:44

## 2020-02-12 RX ADMIN — IPRATROPIUM BROMIDE AND ALBUTEROL SULFATE 3 ML: .5; 3 SOLUTION RESPIRATORY (INHALATION) at 15:50

## 2020-02-12 RX ADMIN — PIPERACILLIN AND TAZOBACTAM 3.38 G: 3; .375 INJECTION, POWDER, LYOPHILIZED, FOR SOLUTION INTRAVENOUS at 13:32

## 2020-02-12 RX ADMIN — METHADONE HYDROCHLORIDE 20 MG: 10 TABLET ORAL at 10:43

## 2020-02-12 RX ADMIN — ALBUTEROL SULFATE 2.5 MG: 2.5 SOLUTION RESPIRATORY (INHALATION) at 02:46

## 2020-02-12 RX ADMIN — PIPERACILLIN AND TAZOBACTAM 3.38 G: 3; .375 INJECTION, POWDER, LYOPHILIZED, FOR SOLUTION INTRAVENOUS at 00:30

## 2020-02-12 RX ADMIN — OXYCODONE HYDROCHLORIDE 20 MG: 15 TABLET ORAL at 15:14

## 2020-02-12 RX ADMIN — IPRATROPIUM BROMIDE AND ALBUTEROL SULFATE 3 ML: .5; 3 SOLUTION RESPIRATORY (INHALATION) at 11:27

## 2020-02-12 RX ADMIN — GABAPENTIN 1600 MG: 800 TABLET, FILM COATED ORAL at 10:44

## 2020-02-12 RX ADMIN — MICAFUNGIN SODIUM 100 MG: 10 INJECTION, POWDER, LYOPHILIZED, FOR SOLUTION INTRAVENOUS at 13:32

## 2020-02-12 RX ADMIN — LEVOTHYROXINE SODIUM 88 MCG: 88 TABLET ORAL at 06:33

## 2020-02-12 RX ADMIN — OXYCODONE HYDROCHLORIDE 20 MG: 15 TABLET ORAL at 10:44

## 2020-02-12 RX ADMIN — METHADONE HYDROCHLORIDE 10 MG: 10 TABLET ORAL at 13:32

## 2020-02-12 RX ADMIN — FUROSEMIDE 40 MG: 40 TABLET ORAL at 10:44

## 2020-02-12 RX ADMIN — PANTOPRAZOLE SODIUM 40 MG: 40 TABLET, DELAYED RELEASE ORAL at 16:11

## 2020-02-12 RX ADMIN — IPRATROPIUM BROMIDE AND ALBUTEROL SULFATE 3 ML: .5; 3 SOLUTION RESPIRATORY (INHALATION) at 06:54

## 2020-02-12 RX ADMIN — GABAPENTIN 1600 MG: 800 TABLET, FILM COATED ORAL at 15:14

## 2020-02-12 RX ADMIN — PIPERACILLIN AND TAZOBACTAM 3.38 G: 3; .375 INJECTION, POWDER, LYOPHILIZED, FOR SOLUTION INTRAVENOUS at 06:33

## 2020-02-12 ASSESSMENT — ACTIVITIES OF DAILY LIVING (ADL)
ADLS_ACUITY_SCORE: 26

## 2020-02-12 NOTE — PLAN OF CARE
A&Ox4. VSS on 2L. COOK, PRN neb given x1. Full liquid diet; tolerating well. BS checks q4hr; sliding scale insulin given. Denies N/V. Suprapubic catheter in place; adequate UO. Incontinent of loose, tan BM x4 this evening. Excoriation to obed area; WOC orders followed. Applied mepilex to redness on coccyx. Malecot in place; thick, brown drainage. G-tube to gravity. J-tube infusing continuous TF @ 60mL/hr. Pulsate mattress in use. Up with Ax2 + lift; turn & repo q2hr. Possible discharge to [facility] 2/12.

## 2020-02-12 NOTE — PLAN OF CARE
Patient discharged at 5:15 PM to Discharged to assisted living  IV was discontinued along with PICC line. Pain at time of discharge was 0/10. Belongings returned to patient.  Packet and scripts sent with patient and [facility]. At time of discharge, patient condition was stable and left the unit escorted by [facility] stretch.   Pt was A/Ox4. VSS. C/o tenderness in back with prn oxy given. Up with lift; T/R q2h- refuses at times. Tolerating Full Liquids, Tube feeding stopped. Incont of stool, SP cath. BG checks Q4 hr- refuses at times. +2 generalized edema. Baseline numbness/tingling. COOK. suprapubic cath, Malecot drain, G tube to gravity, J tube for feeding.  Discharged to the [facility], report given to facility.

## 2020-02-12 NOTE — PROGRESS NOTES
D: CHANELLE following for discharge planning.   I: Per discussion with interdisciplinary team, possible discharge back to [facility] today. [facility] BLS ride set for 1645. PCS faxed. [facility] needs orders two hours prior to arrival. Per Emilie, liaison with [facility], pt's bed hold does  today. Once bed hold expires, pt loses bed and would be treated as a new referral if/when she wanted to return.   P: CHANELLE following.     SARAH Tran, [facility]  [phone]  [facility]    Addendum: Pt will discharge today. Orders faxed and facility updated. Pt updated and in agreement.

## 2020-02-12 NOTE — PROVIDER NOTIFICATION
MD Notification    Person notified: ID MD    Person Name: dr. Solis    Date/Time: 2/12/2020 at 1028    Interaction: phone call with answering service    Purpose of Notification: hospitalist provider needs to know what kind, route and length of time patient should be on antibiotics.    Orders Received: MD returned phone call, does not like this plan- she will contact surgery to discuss.  discharge at this time on hold- MD was made aware that we would lose bed hold at facility if she does not discharge today.  MD to call RN back with plan.    Comments: care coordinator updated

## 2020-02-12 NOTE — PHARMACY-VANCOMYCIN DOSING SERVICE
Pharmacy Vancomycin Note  Date of Service 2020  Patient's  1955   64 year old, female    Indication: Intra-abdominal infection  Goal Trough Level: 15-20 mg/L  Day of Therapy: 18  Current Vancomycin regimen:  2000 mg IV q24h    Current estimated CrCl = Estimated Creatinine Clearance: 152 mL/min (based on SCr of 0.53 mg/dL).    Creatinine for last 3 days  2/10/2020:  5:00 AM Creatinine 0.50 mg/dL  2020:  6:20 AM Creatinine 0.51 mg/dL  2020:  7:53 AM Creatinine 0.53 mg/dL    Recent Vancomycin Levels (past 3 days)  2020:  1:41 PM Vancomycin Level 15.4 mg/L    Vancomycin IV Administrations (past 72 hours)                   vancomycin (VANCOCIN) 2,000 mg in sodium chloride 0.9 % 500 mL intermittent infusion (mg) 2,000 mg New Bag 20 1434     2,000 mg New Bag 02/10/20 1418                Nephrotoxins and other renal medications (From now, onward)    Start     Dose/Rate Route Frequency Ordered Stop    20 1200  vancomycin (VANCOCIN) 2,000 mg in sodium chloride 0.9 % 500 mL intermittent infusion      2,000 mg  over 2 Hours Intravenous EVERY 24 HOURS 20 0159      20 0900  furosemide (LASIX) tablet 40 mg      40 mg Oral DAILY 20 1413      20 1000  piperacillin-tazobactam (ZOSYN) 3.375 g vial to attach to  mL bag     Note to Pharmacy:  Pharmacy can adjust dose based on renal function.    3.375 g  over 1 Hours Intravenous EVERY 6 HOURS 20 0957               Contrast Orders - past 72 hours (72h ago, onward)    Start     Dose/Rate Route Frequency Ordered Stop    20 1345  iopamidol (ISOVUE-370) solution 135 mL      135 mL Intravenous ONCE 20 1336 20 1405          Interpretation of levels and current regimen:  Trough level is  Therapeutic  Has serum creatinine changed > 50% in last 72 hours: No  Urine output:  good urine output  Renal Function: Stable    Plan:  1.  Continue Current Dose  2.  Pharmacy will check trough levels as  appropriate in 3-5 Days.    3. Serum creatinine levels will be ordered daily for the first week of therapy and at least twice weekly for subsequent weeks.      Lance Hernandez RPH        .

## 2020-02-12 NOTE — PROGRESS NOTES
"Surgery    No new complaints  Didn't sleep well last night but not sure why.  Denies pain, dyspnea.  +BM    Gen:  Awake, Alert, NAD  Blood pressure 116/67, pulse 74, temperature 96.1  F (35.6  C), temperature source Axillary, resp. rate 16, height 1.651 m (5' 5\"), weight 136 kg (299 lb 13.2 oz), SpO2 97 %,  Resp - non labored    Abdomen - soft, some diffuse tenderness with palpation, mostly at drain sites. Dressings in place, clean.     Malecot 125cc yesterday  Gastrostomy 750cc yesterday    CT - stable perisplenic fluid collection.     A/P  Stable  Monitor fluid collections  Continue wound care, appreciate WOC nurse management.  Continue J tube feeds  To TCU soon.    Juan Boyle PA-C  Office: [phone]  Pager: [phone]      "

## 2020-02-12 NOTE — PROGRESS NOTES
WOC update on abdominal incision cares:   Discussed wound and POC with Juan SANCHES from Surgery.  Decision made to continue conservative cares (daily Vashe-moist gauze dressings) and not pursue wound vac at this time.  Orders already in place in EPIC and can be continued at discharge.  WOC will continue weekly follow-ups while pt in hospital.

## 2020-02-12 NOTE — PROVIDER NOTIFICATION
MD Notification    Person notified: primary hospitalist    Person Name:  Juan Francisco    Date/Time: 2/12/2020 at 1431    Interaction: web-based page    Purpose of Notification: Please call regarding discharge orders for pt. RN needs to know since pt is not going on antibiotics- do they need to discharge with PICC line? Also pt would like an order for a pulsate mattress at the facility. Orders need to be done by 3pm- so that they can be faxed to facility by then (2 hours prior) to discharge- pt has ride set up at 1645.    Orders Received:    Comments:

## 2020-02-12 NOTE — PLAN OF CARE
A&Ox4. VSS on 2L O2, PRN neb given x1. C/o abdominal & back pain, rest & repositioned; effective. Full liquid diet, tolerating well. BS checks q4hous: pt refused 4am check. Incontinent of stool x1. Coccyx & obed area excoriated; WOC following. Malecot to gravity, thick/brown output. G-tube to gravity. J-tube infusing continuous TF. Suprapubic catheter patent, adequate UO. A2 + lift, T/Rq2 hours. Refused turns overnight. R PICC, unable to get blood return for AM labs. R PIV infusing TKO & int abx. Possible discharge to [facility] today.

## 2020-02-12 NOTE — PROGRESS NOTES
[facility]    Infectious Disease Progress Note    Date of Service (when I saw the patient): 02/12/2020     Assessment & Plan   Bhavani White is a 64 year old female who was admitted on 1/25/2020.       Impression:  1. 64 y.o who was recently admitted to the hospital was discharged on 21 January, that was a prolonged stay for perforated duodenal ulcer, s/p ex lap, TRUONG, Malecot drain placement with perforation, and wound VAC on 12/29/2019. Repeat ex-lap, TRUONG, closure of duodenal perforation over Malecot, G-tube/J-tube placements, delayed primary closure on 1/1/2020.  2. Admitted this occasion with abdominal pain, drainage from the G tube site.   3. CT scan with two large abscess cavities.   4. S/P multiple drain placement.   5. 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   6. She is on IV vancomycin and zosyn and micafungin   7. Diabetes.      Recommendations:   Continue zosyn for the: enterococcus, strep, h parainfluenza and also since intraabdominal abscesses for possible anaerobes which might be involved but not growing.   Continue on Vancomycin for the MRSA from the cultures.   On micafungin for the candida glabrata, can be switched to oral fluc high dose when ready for discharge.   CT scan from 2/11 still with a 9 x 2 cm air and fluid collection. 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 Need for antibiotics, she is on vancomycin and zosyn and micafungin, for approx 2.5 weeks. Message left with surgery to discuss.   Discussed with IM,  Nursing bedside, CC.     Addendum:   Discussed with Jessica, denise guidance, low concern for the fluid seen near the spleen to be infected, has been on antibiotics for a while and the other collections are gone. Will recommend discharge off antibiotics, with close follow up with surgery.     Chacho Solis MD    Interval History   Some  left sided discomfort less than when the drain was present, tiered   Physical Exam   Temp: 96.1  F (35.6  C) Temp src: Axillary BP: 116/67 Pulse: 74 Heart Rate: 76 Resp: 16 SpO2: 97 % O2 Device: Nasal cannula Oxygen Delivery: 2 LPM  Vitals:    01/31/20 0610 02/07/20 0654 02/11/20 0557   Weight: 134.7 kg (297 lb) 135.5 kg (298 lb 12.8 oz) 136 kg (299 lb 13.2 oz)     Vital Signs with Ranges  Temp:  [96.1  F (35.6  C)-97.8  F (36.6  C)] 96.1  F (35.6  C)  Pulse:  [74] 74  Heart Rate:  [72-76] 76  Resp:  [16] 16  BP: (116-147)/(66-70) 116/67  SpO2:  [90 %-97 %] 97 %    Constitutional: Awake, alert, cooperative, no apparent distress  Lungs: Clear to auscultation bilaterally, no crackles or wheezing  Cardiovascular: Regular rate and rhythm, normal S1 and S2, and no murmur noted  Abdomen: malecot, G and J tube in place    Skin: No rashes, no cyanosis, no edema  Other:    Medications     dextrose       sodium chloride 20 mL/hr at 02/11/20 0438       alteplase  2 mg Intravenous Q2H     amLODIPine  5 mg Oral or Feeding Tube Daily     DULoxetine  120 mg Oral QAM     furosemide  40 mg Oral Daily     gabapentin  1,600 mg Oral or Feeding Tube TID     insulin aspart  1-6 Units Subcutaneous Q4H     insulin glargine  27 Units Subcutaneous QAM AC     ipratropium - albuterol 0.5 mg/2.5 mg/3 mL  3 mL Nebulization 4x daily     levothyroxine  88 mcg Oral or Feeding Tube QAM AC     methadone  10 mg Oral Daily at 4 pm     methadone  20 mg Oral or Feeding Tube BID    Or     methadone  20 mg Oral BID     micafungin  100 mg Intravenous Q24H     pantoprazole  40 mg Oral BID AC    Or     pantoprazole  40 mg Oral or Feeding Tube BID AC     piperacillin-tazobactam  3.375 g Intravenous Q6H     polyethylene glycol  17 g Oral Daily     sennosides  2 tablet Oral BID     simvastatin  20 mg Oral or Feeding Tube At Bedtime     sodium chloride (PF)  10 mL Intracatheter Q8H     sodium chloride (PF)  10 mL Irrigation Q8H     sodium chloride (PF)  3 mL  Intracatheter Q8H     traZODone  100 mg Oral At Bedtime     vancomycin (VANCOCIN) IV  2,000 mg Intravenous Q24H       Data   All microbiology laboratory data reviewed.  Recent Labs   Lab Test 02/12/20  0753 02/11/20  0620 02/10/20  0500   WBC 11.0 11.5* 12.9*   HGB 8.7* 8.7* 9.0*   HCT 29.6* 29.9* 29.9*   MCV 87 87 87    366 342     Recent Labs   Lab Test 02/12/20  0753 02/11/20  0620 02/10/20  0500   CR 0.53 0.51* 0.50*     No lab results found.  Recent Labs   Lab Test 01/27/20  1035 01/27/20  1000 01/25/20  2336 01/25/20  2241 01/04/20  1455 01/02/20  1233 12/30/19  0425 12/29/19  1507 12/29/19  1435   CULT No anaerobes isolated  Moderate growth  Streptococcus mitis group  Susceptibility testing not routinely done  *  Moderate growth  Haemophilus parainfluenzae  Susceptibility testing not routinely done  *  On day 1, isolated in broth only:  Candida glabrata complex  Susceptibility testing not routinely done  * No anaerobes isolated  Moderate growth  Streptococcus mitis group  Susceptibility testing not routinely done  *  Moderate growth  Haemophilus parainfluenzae  Susceptibility testing not routinely done  *  Light growth  Enterococcus faecalis  *  On day 1, isolated in broth only:  Candida glabrata complex  Susceptibility testing not routinely done  *  Susceptibility testing requested by  Dr Cardenas, [phone],  would like routine sens done on C. Glabrata. 1.31.20 1007. BENITO   No growth  50,000 to 100,000 colonies/mL  Pseudomonas aeruginosa  * No growth  Heavy growth  Methicillin resistant Staphylococcus aureus (MRSA)  *  Heavy growth  Streptococcus mitis group  Susceptibility testing not routinely done  *  Heavy growth  Haemophilus parainfluenzae  Susceptibility testing not routinely done  * No growth Cultured on the 1st day of incubation:  Staphylococcus hominis  *  Critical Value/Significant Value, preliminary result only, called to and read back by  Sangeeta SHERMAN 1.3.20 @8[facility]     Susceptibility testing done on previous specimen Methicillin resistant Staphylococcus aureus (MRSA) isolated* No growth Cultured on the 1st day of incubation:  Staphylococcus hominis  *  Critical Value/Significant Value, preliminary result only, called to and read back by  Suhail Samano RN from [facility] ICU. 12/30/19 at 1202.TV.    Cultured on the 1st day of incubation:  Staphylococcus epidermidis  *  (Note)  POSITIVE for Staphylococci other than S.aureus, S.epidermidis and  S.lugdunensis, by "[employer]igene multiplex nucleic acid test.  Coagulase-negative staphylococci are the most common venipuncture or  collection associated skin CONTAMINANTS grown in blood cultures.  Final identification and antimicrobial susceptibility testing will be  verified by standard methods.    Specimen tested with [employer]ig[employer] multiplex, gram-positive blood culture  nucleic acid test for the following targets: Staph aureus, Staph  epidermidis, Staph lugdunensis, other Staph species, Enterococcus  faecalis, Enterococcus faecium, Streptococcus species, S. agalactiae,  S. anginosus grp., S. pneumoniae, S. pyogenes, Listeria sp., mecA  (methicillin resistance) and Nick/B (vancomycin resistance).    Critical Value/Significant Value called to and read back by SUHAIL SAMANO RN 12/30/2019 @ 1678 BC         Attestation:  Total time on the floor involved in the patient's care: 65 minutes. Total time spent in counseling/care coordination: >50%

## 2020-02-12 NOTE — DISCHARGE SUMMARY
[facility]    Discharge Summary  Hospitalist    Date of Admission:  1/25/2020  Date of Discharge:  2/12/2020  Discharging Provider: Grisel Chicas MD    Discharge Diagnoses   Abdominal pelvic abscess   fluid collection around the spleen    History of Present Illness   Please review admission history and physical.    Hospital Course   Bhavani White was admitted on 1/25/2020.  The following problems were addressed during her hospitalization:    Active Problems:    Abdominopelvic abscess (H)    Infection due to Candida glabrata    Infection due to alpha-hemolytic Streptococcus    History of MRSA infection    Leukocytosis    Elevated BMI    Abdominal pain    Bhavani White is a pleasant 64 year old woman who was admitted on 1/25/2020 for evaluation of abdominal pain and increased drainage from G tube site.  Current problems include:     Complex anterior upper abdominal fluid collections, with recent discharge on January 21, 2020   - on 12/29/2019 had: perforated duodenal ulcer, s/p ex lap, TRUONG, Malecot drain placement with perforation, and wound VAC placement on 12/29/2019.   - on 1/1/20 had: Repeat ex-lap, TRUONG, closure of duodenal perforation over Malecot, G-tube/J-tube placements.  She was seen by general surgery while here and she underwent additional drain placement on 1/27, currently 2 of those drains were removed and she is tolerating full liquid diet.  She was continued on IV Vancomycin and Zosyn. Micafungin added 1/3 (for C. Glabrata).  Repeat CT done on 2/11 showed persistent fluid in the splenic area, this was discussed with surgery by myself as well as infectious disease, for now surgery is recommending repeat CT in 1 week and follow-up with them, and there is no concern about abscess in the area as per them so as per discussion with infectious disease plan is to stop all antibiotics.  Patient will be discharged on PTA methadone dose, she is on high dose of oxycodone, she did receive a few  doses of IV Dilaudid prior to change of dressing, 2 of her drains were removed on 2/10 prior to discharge.    Vaginal irritation:  Doubt yeast infection given IV micafungin. More likely pt has irritation from occasional stool and poor hygiene.   - suggested attention to cleaning area with barrier cream or ointment.     Lines/access:  - Patient had a new PICC line placed 1/31.  Her J-tube was pulled on 2/3, and was replaced on 2/4 by IR   -Continue tube feeds for her nutrition.  Acute on chronic constipation; resolved.  Currently having loose stools.  - continue current bowel meds     Dyslipidemia  - continue simvastatin      Hypertension; pressures stable  - continue amlodipine     Hypoxia, with some dyspnea with exertion.  Bilateral pleural effusions noted in recent imaging studies.  Stable over the past 1 or more weeks.  Bhavani says she was not on 02 at home, however.  - CXR 2/5 indicated bilateral pleural effusions as previous  - continue daily lasix. Pt has noticeably lost fluid weight since a week ago.  - encourage IS     Diabetes mellitus, insulin-dependent: was on Lantus prior to admission. Good glycemic control here.  - continue prior to admission Lantus 27 units Q a.m.  - Insulin sliding scale, medium.     Hypothyroidism: Stable.  - continue prior to admission levothyroxine      Chronic pain; stable.  - continue methadone, cymbalta  - continue gabapentin     Depression with anxiety; stable.  - continue cymbalta     GERD: Stable.  - continue PPI     Insomnia: Stable.  - continue prior to admission trazodone    Grisel Chicas MD, MD    Significant Results and Procedures       Pending Results     Unresulted Labs Ordered in the Past 30 Days of this Admission     No orders found from 12/26/2019 to 1/26/2020.          Code Status   Full Code       Primary Care Physician   [facility]    Physical Exam   Temp: 96.1  F (35.6  C) Temp src: Axillary BP: 116/67 Pulse: 74 Heart Rate: 76 Resp: 16 SpO2:  97 % O2 Device: Nasal cannula Oxygen Delivery: 2 LPM  Vitals:    01/31/20 0610 02/07/20 0654 02/11/20 0557   Weight: 134.7 kg (297 lb) 135.5 kg (298 lb 12.8 oz) 136 kg (299 lb 13.2 oz)     Vital Signs with Ranges  Temp:  [96.1  F (35.6  C)-97.8  F (36.6  C)] 96.1  F (35.6  C)  Pulse:  [74] 74  Heart Rate:  [72-76] 76  Resp:  [16] 16  BP: (116-147)/(66-70) 116/67  SpO2:  [90 %-97 %] 97 %  I/O last 3 completed shifts:  In: 600 [P.O.:250; I.V.:350]  Out: 6275 [Urine:5400; Emesis/NG output:750; Drains:125]    The patient was examined on the day of discharge.    Discharge Disposition   Discharged to nursing home  Condition at discharge: Stable    Consultations This Hospital Stay   PHARMACY TO DOSE VANCO  SURGERY GENERAL IP CONSULT  CARE TRANSITION RN/SW IP CONSULT  PHARMACY TO DOSE VANCO  WOUND OSTOMY CONTINENCE NURSE  IP CONSULT  WOUND OSTOMY CONTINENCE NURSE  IP CONSULT  NUTRITION SERVICES ADULT IP CONSULT  PHARMACY IP CONSULT  INFECTIOUS DISEASES IP CONSULT  INFECTIOUS DISEASES IP CONSULT  VASCULAR ACCESS ADULT IP CONSULT  PHYSICAL THERAPY ADULT IP CONSULT  OCCUPATIONAL THERAPY ADULT IP CONSULT    Time Spent on this Encounter   IGrisel MD, personally saw the patient today and spent greater than 30 minutes discharging this patient.    Discharge Orders      General info for SNF    Length of Stay Estimate: Short Term Care: Estimated # of Days <30  Condition at Discharge: Improving  Level of care:skilled   Rehabilitation Potential: Good  Admission H&P remains valid and up-to-date: Yes  Recent Chemotherapy: N/A  Use Nursing Home Standing Orders: Yes     Mantoux instructions    Give two-step Mantoux (PPD) Per Facility Policy Yes     Reason for your hospital stay    Abdominal abscess     Glucose monitor nursing POCT    Before meals and at bedtime     Intake and output    Every shift     Daily weights    Call Provider for weight gain of more than 2 pounds per day or 5 pounds per week.     Lundberg catheter    To  straight gravity drainage. Change catheter every 2 weeks and PRN for leaking or decreased uring output with signs of bladder distention. DO NOT change catheter without a specific MD order IF diagnosis of benign prostatic hypertrophy (BPH), neurogenic bladder, or other urological conditions     Follow Up and recommended labs and tests    Follow up with senior care physician.  The following labs/tests are recommended: cbc,bmp in 4-5 days .repeat CT abdomen in 1 week and follow up with surgery .     Activity - Up with nursing assistance     Physical Therapy Adult Consult    Evaluate and treat as clinically indicated.    Reason:  Deconditioning     Occupational Therapy Adult Consult    Evaluate and treat as clinically indicated.    Reason: deconditioning     Fall precautions     Oxygen Adult/Peds     Advance Diet as Tolerated    Follow this diet upon discharge: Orders Placed This Encounter      Adult Formula Drip Feeding: Continuous Peptamen Intense VHP; Jejunostomy; Goal Rate: 60; mL/hr; Medication - Feeding Tube Flush Frequency: At least 15-30 mL water before and after medication administration and with tube clogging; Amount to Send (N...      Full Liquid Diet     Discharge Medications   Current Discharge Medication List      START taking these medications    Details   ipratropium - albuterol 0.5 mg/2.5 mg/3 mL (DUONEB) 0.5-2.5 (3) MG/3ML neb solution Take 1 vial (3 mLs) by nebulization 4 times daily    Associated Diagnoses: Abdominopelvic abscess (H)      ondansetron (ZOFRAN-ODT) 4 MG ODT tab Take 1 tablet (4 mg) by mouth every 6 hours as needed for nausea or vomiting    Associated Diagnoses: Abdominopelvic abscess (H)      pantoprazole (PROTONIX) 2 mg/mL SUSP suspension 20 mLs (40 mg) by Oral or Feeding Tube route 2 times daily (before meals)    Associated Diagnoses: Abdominopelvic abscess (H)         CONTINUE these medications which have CHANGED    Details   methadone (DOLOPHINE) 10 MG tablet Take 2 tablets (20  mg) by mouth 2 times daily Per Rx bottle take 2 tablets (20mg) in morning, 1 tablet (10mg) in afternoon, 2 tablets (20mg) in evening.  Qty: 20 tablet, Refills: 0    Associated Diagnoses: Fibromyalgia      oxyCODONE IR (ROXICODONE) 20 MG TABS immediate release tablet Take 20 mg by mouth every 4 hours as needed for moderate to severe pain  Qty: 20 tablet, Refills: 0    Associated Diagnoses: Abdominopelvic abscess (H)         CONTINUE these medications which have NOT CHANGED    Details   albuterol (PROVENTIL) (2.5 MG/3ML) 0.083% neb solution Take 1 vial (2.5 mg) by nebulization every 4 hours as needed for shortness of breath / dyspnea or wheezing  Qty: 30 vial, Refills: 11    Associated Diagnoses: Moderate persistent asthma with acute exacerbation      amLODIPine (NORVASC) 5 MG tablet 1 tablet (5 mg) by Oral or Feeding Tube route daily  Refills: 0    Associated Diagnoses: Hypertension goal BP (blood pressure) < 140/90      bisacodyl (DULCOLAX) 10 MG suppository Place 1 suppository (10 mg) rectally daily as needed for constipation  Refills: 0    Associated Diagnoses: Drug-induced constipation      blood glucose monitoring (NO BRAND SPECIFIED) meter device kit Dispense freestyle zak glucose meter  Qty: 1 kit, Refills: 0    Associated Diagnoses: Type 2 diabetes mellitus with diabetic nephropathy (H)      blood glucose monitoring (NO BRAND SPECIFIED) test strip Use to test blood sugars 1 times daily or as directed  Qty: 100 strip, Refills: 11    Comments: Dispense per pt insurance per pt preference  Associated Diagnoses: Type 2 diabetes mellitus with diabetic nephropathy (H)      docusate sodium (COLACE) 100 MG capsule Take 2 capsules (200 mg) by mouth 2 times daily  Refills: 0    Associated Diagnoses: Drug-induced constipation      DULoxetine (CYMBALTA) 60 MG capsule Take 120 mg by mouth every morning      furosemide (LASIX) 40 MG tablet Take 1 tablet (40 mg) by mouth daily  Refills: 0    Associated Diagnoses:  Hypertension goal BP (blood pressure) < 140/90      gabapentin (NEURONTIN) 800 MG tablet Take 1,600 mg by mouth 3 times daily      hypromellose-dextran (ARTIFICAL TEARS) 0.1-0.3 % ophthalmic solution Place 1 drop into both eyes 3 times daily as needed for dry eyes  Refills: 0    Associated Diagnoses: Fibromyalgia      insulin aspart (NOVOLOG PEN) 100 UNIT/ML pen Inject 0-6 Units Subcutaneous 2 times daily Twice a day, for glucometer 150-200 give 2 units, 201-250 4 units, >250 6 units.  Refills: 0    Associated Diagnoses: Type 2 diabetes mellitus with diabetic nephropathy, unspecified whether long term insulin use (H)      insulin glargine (LANTUS PEN) 100 UNIT/ML pen Inject 27 Units Subcutaneous every morning (before breakfast)  Refills: 0    Comments: If Lantus is not covered by insurance, may substitute Basaglar at same dose and frequency.    Associated Diagnoses: Type 2 diabetes mellitus with diabetic nephropathy, unspecified whether long term insulin use (H)      levalbuterol (XOPENEX) 1.25 MG/3ML neb solution TAKE ONE VIAL BY NEBULIZATION EVERY 8 HOURS AS NEEDED FOR SHORTNESS OF BREATH/DYSPNEA OR WHEEZING  Qty: 825 mL, Refills: 1    Comments: **Patient requests 90 days supply**  Associated Diagnoses: Preop general physical exam; Moderate persistent asthma with acute exacerbation      levothyroxine (SYNTHROID/LEVOTHROID) 88 MCG tablet TAKE 1 TABLET(88 MCG) BY MOUTH DAILY  Qty: 90 tablet, Refills: 0    Associated Diagnoses: Hypothyroidism due to acquired atrophy of thyroid      miconazole (MICATIN/MICRO GUARD) 2 % external powder Apply topically 2 times daily as needed for other (topical candidiasis)  Refills: 0    Associated Diagnoses: Morbid obesity, unspecified obesity type (H)      polyethylene glycol (MIRALAX/GLYCOLAX) packet Take 17 g by mouth 2 times daily as needed (constipation)  Refills: 0    Associated Diagnoses: Drug-induced constipation      potassium chloride ER (K-DUR/KLOR-CON M) 20 MEQ CR tablet  Take 1 tablet (20 mEq) by mouth daily  Refills: 0    Associated Diagnoses: Hypertension goal BP (blood pressure) < 140/90      senna-docusate (SENOKOT-S;PERICOLACE) 8.6-50 MG per tablet Take 2 tablets by mouth 2 times daily  Qty: 100 tablet    Associated Diagnoses: Acute respiratory failure with hypoxia and hypercapnia (H)      simvastatin (ZOCOR) 20 MG tablet TAKE 1 TABLET(20 MG) BY MOUTH AT BEDTIME  Qty: 90 tablet, Refills: 3    Associated Diagnoses: Hyperlipidemia LDL goal <100      traZODone (DESYREL) 100 MG tablet Take 100 mg by mouth At Bedtime      VENTOLIN  (90 Base) MCG/ACT inhaler INHALE 2 PUFFS INTO THE LUNGS EVERY 6 HOURS AS NEEDED FOR SHORTNESS OF BREATH  Qty: 18 g, Refills: 0    Associated Diagnoses: Moderate persistent asthma with acute exacerbation      blood glucose (NO BRAND SPECIFIED) lancets standard Use to test blood sugar 1 times daily or as directed.  Qty: 100 each, Refills: 11    Comments: Dispense per pt insurance per pt preference  Associated Diagnoses: Type 2 diabetes mellitus with diabetic nephropathy (H)      !! order for DME Equipment being ordered: Nebulizer  Qty: 1 each, Refills: 0    Associated Diagnoses: Moderate persistent asthma with acute exacerbation      !! order for DME Equipment being ordered: BIPAP.   15/5, Rate of 12, 2L O2 to keep sats 90-95%.  Qty: 1 each, Refills: 0    Associated Diagnoses: Acute respiratory failure with hypoxia and hypercapnia (H)      !! order for DME Equipment being ordered: Nebulizer  Qty: 1 Package, Refills: 0    Associated Diagnoses: Moderate persistent asthma with acute exacerbation      !! order for DME Equipment being ordered: Hospital Bed, total electric (head, foot, and height adjustments), with any type side rails, with mattress  Qty: 1 each, Refills: 0    Associated Diagnoses: Wheelchair bound; Ankylosing spondylitis (H); Spinal stenosis in cervical region; Lumbar spinal stenosis; Fibromyalgia; Chronic low back pain      !! order for  "DME Equipment being ordered: Motorized Wheelchair  Qty: 1 each, Refills: 0    Associated Diagnoses: Wheelchair bound; Ankylosing spondylitis (H); Spinal stenosis in cervical region; Lumbar spinal stenosis; Fibromyalgia; Chronic low back pain      !! order for DME Equipment being ordered: Trapeze bar for hospital bed  Qty: 1 each, Refills: 0    Associated Diagnoses: Wheelchair bound; Ankylosing spondylitis (H); Spinal stenosis in cervical region; Lumbar spinal stenosis; Fibromyalgia; Chronic low back pain       !! - Potential duplicate medications found. Please discuss with provider.      STOP taking these medications       blood glucose monitoring (FREESTYLE FREEDOM LITE) meter device kit Comments:   Reason for Stopping:         pantoprazole (PROTONIX) 40 MG EC tablet Comments:   Reason for Stopping:             Allergies   Allergies   Allergen Reactions     Povidone Iodine      \"mild skin irritation\" per patient report     Toradol [Ketorolac] Other (See Comments)     Pt gets nightmares     Tramadol Other (See Comments)     Data                Most Recent 3 CBC's:  Recent Labs   Lab Test 02/12/20  0753 02/11/20  0620 02/10/20  0500   WBC 11.0 11.5* 12.9*   HGB 8.7* 8.7* 9.0*   MCV 87 87 87    366 342      Most Recent 3 BMP's:  Recent Labs   Lab Test 02/12/20  0753 02/11/20  0620 02/10/20  0500  02/07/20  0610 02/06/20  0600   NA  --   --  136  --  138 138   POTASSIUM  --   --  3.6  --  3.7 3.6   CHLORIDE  --   --  94  --  94 93*   CO2  --   --  41*  --  44* 43*   BUN  --   --  23  --  19 17   CR 0.53 0.51* 0.50*   < > 0.51* 0.48*   ANIONGAP  --   --  1*  --  <1* 2*   DORY  --   --  10.2*  --  10.1 9.8   * 125* 129*  --  128* 150*    < > = values in this interval not displayed.     Most Recent 2 LFT's:  Recent Labs   Lab Test 01/26/20  0821 01/25/20  2241   AST 22 22   ALT 20 22   ALKPHOS 136 148   BILITOTAL 0.3 0.3     Most Recent INR's and Anticoagulation Dosing History:  Anticoagulation Dose History  "    Recent Dosing and Labs Latest Ref Rng & Units 8/29/2010 8/30/2010 4/18/2016 7/26/2017 12/29/2019 12/29/2019 1/27/2020    INR 0.86 - 1.14 1.07 1.12 1.00 0.97 Canceled, Test credited 1.61(H) 1.18(H)        Most Recent 3 Troponin's:  Recent Labs   Lab Test 01/18/20  0500 12/30/19  0610 12/29/19  2130   TROPI 0.037 12.097* 12.385*     Most Recent Cholesterol Panel:  Recent Labs   Lab Test 01/17/20  0620  05/21/19  1121   CHOL  --   --  201*   LDL  --   --  96   HDL  --   --  60   TRIG 112   < > 225*    < > = values in this interval not displayed.     Most Recent 6 Bacteria Isolates From Any Culture (See EPIC Reports for Culture Details):  Recent Labs   Lab Test 01/27/20  1035 01/27/20  1000 01/25/20  2336 01/25/20  2241 01/04/20  1455 01/02/20  1233   CULT Moderate growth  Streptococcus mitis group  Susceptibility testing not routinely done  *  Moderate growth  Haemophilus parainfluenzae  Susceptibility testing not routinely done  *  On day 1, isolated in broth only:  Candida glabrata complex  Susceptibility testing not routinely done  *  No anaerobes isolated Moderate growth  Streptococcus mitis group  Susceptibility testing not routinely done  *  Moderate growth  Haemophilus parainfluenzae  Susceptibility testing not routinely done  *  Light growth  Enterococcus faecalis  *  On day 1, isolated in broth only:  Candida glabrata complex  Susceptibility testing not routinely done  *  Susceptibility testing requested by  Dr Cardenas, [phone],  would like routine sens done on C. Glabrata. 1.31.20 1007. BENITO    No anaerobes isolated No growth  50,000 to 100,000 colonies/mL  Pseudomonas aeruginosa  * No growth  Heavy growth  Methicillin resistant Staphylococcus aureus (MRSA)  *  Heavy growth  Streptococcus mitis group  Susceptibility testing not routinely done  *  Heavy growth  Haemophilus parainfluenzae  Susceptibility testing not routinely done  * No growth Cultured on the 1st day of  incubation:  Staphylococcus hominis  *  Critical Value/Significant Value, preliminary result only, called to and read back by  Sangeeta SHERMAN 1.3.20 @6644 AE    Susceptibility testing done on previous specimen     Most Recent TSH, T4 and A1c Labs:  Recent Labs   Lab Test 01/07/20  0350 10/28/19  1459  08/15/18  1224   TSH  --  0.86  --  0.28*   T4  --   --   --  1.03   A1C 7.1* 7.3*   < > 6.0*    < > = values in this interval not displayed.     Results for orders placed or performed during the hospital encounter of 01/25/20   CT Abdomen Pelvis w Contrast    Narrative    EXAM: CT ABDOMEN PELVIS W CONTRAST  LOCATION: [facility]  DATE/TIME: 1/25/2020 11:31 PM    INDICATION: Abd infection (incl peritonitis)  COMPARISON: 01/11/2020  TECHNIQUE: CT scan of the abdomen and pelvis was performed following injection of IV contrast. Multiplanar reformats were obtained. Dose reduction techniques were used.  CONTRAST: 125mL [id number]    FINDINGS:   LOWER CHEST: Moderate bilateral pleural effusions and partially visualized lung base atelectasis, similar 2 prior. Dense calcifications along the wall of the left ventricle, presumed related to old infarct.    HEPATOBILIARY: The liver is normal in size and contour. Patent portal and hepatic veins. No focal lesion. The gallbladder is normal in size, with no stones. No biliary dilatation.    PANCREAS: Atrophic. No ductal dilatation. There is some fluid near the upper margin of the pancreatic head and neck which is not substantially changed.    SPLEEN: Normal.    ADRENAL GLANDS: Normal.    KIDNEYS/BLADDER: Heterogeneous enhancement pattern in the right kidney, suspect pyelonephritis. No hydronephrosis. Unchanged left upper pole simple renal cyst and lower pole simple cyst, for which no follow-up is needed. No stones in the urinary tract. A   suprapubic bladder catheter is present.    BOWEL: There is dense oral contrast within the rectum and distal colon. A  percutaneous gastrostomy tube is noted in the distal stomach. There is adjacent gas and fluid-filled collection which appears larger and more walled off and rim-enhancing than   previous, suspicious for abscess. The previously noted abdominal collections in the left upper quadrant are also larger and now demonstrate more loculated appearance increased rim enhancement, likely developing abscesses. The left upper quadrant   collection is again noted to contain small amount of air. It measures about 14.5 x 5.1 cm on transverse imaging and 12.7 cm craniocaudal, along the lateral margin of the spleen and in the anterior left upper quadrant, communicating inferiorly with a   complex anterior omental gas and fluid containing collection which is surrounding the gastrostomy site. The anterior abdominal collection is difficult to measure due to the complex multiloculated appearance, however spans about 16 cm transverse diameter   image 111 series 4, and has an overall craniocaudal extent of about 12 cm on coronal image 13 series 5, noted to surround the anterior gastric antrum and communicating with the left abdominal collection described above. There is some nonloculated,   nonrim-enhancing ascites around the liver.    There is a drain in the upper abdomen which enters the left upper quadrant and terminates in the right upper quadrant, lateral paracolic gutter near this cyst hepatic flexure. A separate right upper quadrant drain is seen which terminates immediately   inferior to the left hepatic medial segment and adjacent to the duodenal bulb, located within the complex ill-defined collection of air and fluid.    A percutaneous jejunostomy is noted in the central lower abdomen, without complication.    LYMPH NODES: Normal.    VASCULATURE: No areas of venous thrombosis. No aortic aneurysm.    PELVIC ORGANS: Hysterectomy    OTHER: Right lower quadrant fat-containing abdominal wall hernias are noted. A right periumbilical  incision contains small foci of air and previously noted staples have been removed, possibly mildly dehiscent.    MUSCULOSKELETAL: No acute findings. Scoliosis and multilevel degenerative changes are noted in the spine.      Impression    IMPRESSION:   1.  Complex anterior upper abdominal fluid collections appear more loculated and rim-enhancing than previous, likely developing abscesses, with increased volume/size. There is probable communication with the left upper quadrant collection to the   multiloculated anterior epigastric collection which surrounds the entry site of the gastrostomy tube. The right upper quadrant drains do not appear to be located within these collections. There is still some gas and fluid around the right upper quadrant   drain which terminates near the duodenal bulb, but a well-formed abscess is not seen in this region.    2.  Increased ascites in the upper abdomen around the liver and above the pancreas, however no findings of acute pancreatitis.    3.  Pleural effusions and lung base atelectasis appear similar.    4.  Interval removal of skin staples in abdominal wall incision which appears slightly dehiscent.    5.  Heterogeneous areas of right renal hypoenhancement. Correlate clinically for any evidence of pyelonephritis.    6.  Unremarkable suprapubic bladder catheter and percutaneous jejunostomy.    7.  Left ventricular wall calcifications are probably related to a previous infarct.     CT Abdomen Peritoneum Abscess Drainage    Narrative    CT-GUIDED RIGHT UPPER AND LEFT UPPER QUADRANT DRAINS  1/27/2020 11:00  AM    MEDICATIONS/SEDATION TIME: 27 1% lidocaine locally, 3.5 mg Versed, 175  mcg fentanyl, Given by Neva Dixon RN. Throughout the procedure, the  patient was monitored by a radiology nurse for cardiac rhythm and  oxygen saturation which remained stable.    CONTRAST: None.    COMPLICATIONS: No immediate.    HISTORY: Patient has a history of perforated duodenal  ulcers.  Underwent exploratory laparotomy and repair with surgical drain  placement in early January. Presented purulent drainage from a  gastrostomy tube site. Of note the patient has both a gastrostomy tube  and a jejunostomy tube. CT scan performed 1/25/2020 demonstrated  interval progression in the size of the right upper quadrant and left  upper quadrant fluid collections. Both collections may communicate  with a multiloculated phlegmonous collection in the lower midline  abdomen.    PROCEDURE AND FINDINGS:  Following a discussion of the risks,  benefits, indications and alternatives to treatment, appropriate  informed consent was obtained.  The patient was brought to the  interventional radiology suite and placed supine on the table. The  anterior abdomen was prepped and draped in a sterile fashion.  A time  out was performed per universal protocol policy to ensure correct  patient, site and procedure to be performed. Radiation dose for this  scan was reduced using automated exposure control, adjustment of the  mA and/or kV according to patient size, or iterative reconstruction  technique.     5 mm unenhanced images were obtained through the region of the  superior abdomen, images showed right upper and left upper quadrant  fluid collections. A suitable route for percutaneous drainage was then  chosen. The overlying skin was prepped and draped in a sterile manner.  1% lidocaine was used for local anesthesia. Under CT guidance,  18-gauge trocar needle was advanced into the left upper quadrant  collection initially. 10 mL of purulent  fluid was aspirated and sent  to lab for culture. A 0.035 wire was advanced through the catheter and  exchange was made for a 10 Burmese pigtail drain. The pigtail was  locked and connected to THAI bulb. The drain was secured to the skin  with an adhesive device. A sterile dressing was applied.     Then a second 18-gauge needle was advanced into the right upper  quadrant collection.  10 mL of purulent fluid was aspirated and sent to  the laboratory. An 035 guidewire was advanced through the needle. The  tract was serially dilated, and a 10 Salvadorean drain was advanced over  the wire. The pigtail loop was engaged. The catheter was secured to  the skin with an adhesive device.     Moderate sedation:  The patient was administered titrated doses of  Versed and fentanyl intravenously during the procedure. Throughout the  procedure, the patient was monitored independently by a radiology  nurse for cardiac rhythm and oxygen saturation which remained stable.  Total sedation time was 30. The patient tolerated the procedure well  and left interventional radiology in stable condition.      Impression    IMPRESSION:  1. CT-guided placement of two separate drains; the first in a left  upper quadrant fluid collection, and the second in a right upper  quadrant fluid collection.  2. Purulent fluid was aspirated from each drain.  3. The upper quadrant fluid collections likely communicate with a  loculated, phlegmonous collection in the more inferior midline  abdomen.  4. Recommend obtaining repeat CT scan after drainage output is  decreased to less than 20 cc.    LORA MARTIN MD   CT Abdomen Pelvis w Contrast    Narrative    CT ABDOMEN AND PELVIS WITH CONTRAST 1/31/2020 12:58 PM       INDICATION: Increasing abdominal pain and white blood cell count.    TECHNIQUE: CT abdomen and pelvis. Coronal reconstruction. Dose  reduction techniques were used.    IV CONTRAST: 135 mL Isovue-370    COMPARISON: 1/25/2020    FINDINGS:   LUNG BASES: Moderate left and small right pleural effusions. The right  hemidiaphragm is elevated and there is consolidation/atelectasis in  both lung bases.    ABDOMEN: New percutaneous drainage catheter in the perisplenic fluid  collection. This fluid collection is significantly smaller than  previous, measuring only 1.5 cm in thickness compared to previous 5.5.  A second drainage catheter in the  region of the gastrohepatic  ligament, with near complete resolution of the loculated fluid  collection in that location. A surgical drain in the right upper  quadrant is unchanged.    Percutaneous gastrostomy tube in the stomach. There is an additional  surgically placed drain in the right upper quadrant with a mushroom  type end, adjacent to the duodenum. Minimal gas and inflammation  surround this tube without significant residual fluid.    Perihepatic ascites is unchanged. The pancreas is atrophic. Normal  liver, adrenal glands, and right kidney. Left renal cyst.    PELVIS: Suprapubic catheter. No additional fluid collections.    MUSCULOSKELETAL: Negative.    CONCLUSION: Significant reduction in size of abdominal fluid  collections, status post drainage catheter placement.    CLIVE TEIXEIRA MD   IR Jejunostomy Tube Change    Narrative    INTERVENTIONAL RADIOLOGY JEJUNOSTOMY TUBE CHANGE 2/4/2020 10:09 AM    HISTORY: 64-year-old woman with J-tube that was pulled out. Request  made for replacement. The tube was placed surgically approximately 1  month prior.    TECHNIQUE: Patient was brought to the interventional radiology  department after informed consent obtained. Patient was placed in a  supine position. Skin overlying the tube entry site was prepped and  draped in standard sterile fashion. A JB1 catheter was advanced  through the tract and into the jejunum, confirmed with contrast  imaging. Stiff angled Glidewire was advanced through the tube and a  new 16 Nicaraguan SEVERIANO jejunostomy tube was placed. Approximately 15 cm was  removed from the distal end of the tube given long length of the tube.  Contrast was again injected to confirm appropriate position. 4 mL of  fluid was used to fill the balloon and keep tube in place.    Sedation: None  Fluoroscopic time: 4.5 minutes  Air Kerma: 4.4 mGy  Contrast: 80 mL of Omnipaque 240 administered into the jejunum without  complication.  Local anesthetic: None.    FINDINGS:  A total of 7 spot fluoroscopic images obtained throughout  the procedure. Initial three images were obtained in an attempt to  find the tract. Once found, tube confirmed to be in appropriate  position by completion.      Impression    IMPRESSION: Successful replacement of 16 Mosotho jejunostomy tube. A  SEVERIANO jejunostomy tube was placed.    ANGY KRAMER MD   XR Chest 2 Views    Narrative    XR CHEST 2 VW  2/6/2020 9:39 AM       INDICATION: Please eval. re. ongoing hypoxia; recent CT abdomen showed  bilateral pleural effusions.  Thanks.  COMPARISON: 1/31/2020, 1/18/2020       Impression    IMPRESSION: Right hemidiaphragm remains elevated with adjacent  atelectasis and a small right pleural effusion. There is a  moderate-sized left pleural effusion with underlying  consolidation/atelectasis in the left lower lobe. This is similar to  recent CT but increased from 1/18/2028. A right PICC line is unchanged  and in good position.    CLIVE TEIXEIRA MD   CT Abdomen Pelvis w Contrast    Narrative    CT ABDOMEN AND PELVIS WITH CONTRAST 2/11/2020 2:12 PM     HISTORY: Abdomen infection (including peritonitis).    COMPARISON: January 31, 2020    TECHNIQUE: Volumetric helical acquisition of CT images from the lung  bases through the symphysis pubis after the administration of 135 mL  Isovue-370  intravenous contrast. Radiation dose for this scan was  reduced using automated exposure control, adjustment of the mA and/or  kV according to patient size, or iterative reconstruction technique.    FINDINGS: Air and fluid collection around the spleen now measures 9.2  x 2.4 cm; previously this measured 9.0 x 2.0 cm with a drain in place  previously. G-tube and right upper quadrant surgical tube noted. A  previously seen pigtail catheter has been removed. Previously seen  surgical drain has been removed. J-tube is noted in the lower abdomen.  No bowel obstruction. No hydronephrosis. The liver, spleen, adrenal  glands, kidneys, and  pancreas demonstrate no worrisome focal lesion.  Renal cysts noted. Similar amount of intra-abdominal ascites compared  to previous.    Survey of the visualized bony structures demonstrates no destructive  bony lesions. Small left and minimal right pleural effusion and  bibasilar atelectasis and/or infiltrate at the lung bases.      Impression    IMPRESSION:  1. Air and fluid collection with an enhancing capsule adjacent to the  spleen in the left upper quadrant is slightly increased in size  compared to previous.  2. Similar small amount of ascites.   3. Small left and minimal right pleural effusion and bibasilar  atelectasis and/or infiltrate similar to previous.    ANT WHEAT MD     *Note: Due to a large number of results and/or encounters for the requested time period, some results have not been displayed. A complete set of results can be found in Results Review.

## 2020-02-12 NOTE — PROVIDER NOTIFICATION
MD Notification    Person notified: primary hospitalist    Person Name: Dr. Chicas    Date/Time: 2/12/2020 at 0746    Interaction: web-based page    Purpose of Notification: PICC is clotted, please place an order for alteplase, so IV team can clear her line.    Orders Received:    Comments:

## 2020-02-13 ENCOUNTER — PATIENT OUTREACH (OUTPATIENT)
Dept: CARE COORDINATION | Facility: CLINIC | Age: 65
End: 2020-02-13

## 2020-02-13 NOTE — PROGRESS NOTES
Clinic Care Coordination Contact  Care Coordination Transition Communication    Referral Source: IP Report    Clinical Data: Patient was hospitalized at [facility] from 01/25 to 02/12 with diagnosis of Abdominal Pelvic Abscess, fluid collection around the spleen.     Transition to Facility:              Facility Name: [facility]              Contact name and phone number/fax: RNCC sent secure PHI fax via communication management tab. Requested notification of patients discharge and any outstanding care coordination needs.      Plan: RN/SW Care Coordinator will await notification from facility staff informing RN/SW Care Coordinator of patient's discharge plans/needs. RN/SW Care Coordinator will review chart and outreach to facility staff every 4 weeks and as needed.     Ebony Nguyen RN Care Coordinator  [facility]  Email: [email or web address]  Phone: [phone]

## 2020-02-13 NOTE — LETTER
[facility]   To:             Please give to facility    From:   Ebony Nguyen RN  Care Coordinator   [facility]   P: [phone]  [email or web address]   Patient Name:  Bhavani White YOB: 1955   Admit date: Readmitted 02/12/2020      *Information Needed:  Please contact me when the patient will discharge (or if they will move to long term care)- include the discharge date, disposition, and main diagnosis   - If the patient is discharged with home care services, please provide the name of the agency    Also- Please inform me if a care conference is being held.   Phone, Fax or Email with information                   Thank you

## 2020-02-17 ENCOUNTER — DOCUMENTATION ONLY (OUTPATIENT)
Dept: OTHER | Facility: CLINIC | Age: 65
End: 2020-02-17

## 2020-02-17 ENCOUNTER — AMBULATORY - HEALTHEAST (OUTPATIENT)
Dept: OTHER | Facility: CLINIC | Age: 65
End: 2020-02-17

## 2020-02-18 ENCOUNTER — HOSPITAL ENCOUNTER (OUTPATIENT)
Facility: CLINIC | Age: 65
End: 2020-02-18
Admitting: SURGERY
Payer: MEDICARE

## 2020-02-18 ENCOUNTER — OFFICE VISIT (OUTPATIENT)
Dept: SURGERY | Facility: CLINIC | Age: 65
End: 2020-02-18
Payer: MEDICARE

## 2020-02-18 VITALS — HEIGHT: 65 IN | BODY MASS INDEX: 48.82 KG/M2 | WEIGHT: 293 LBS

## 2020-02-18 DIAGNOSIS — Z09 SURGERY FOLLOW-UP EXAMINATION: Primary | ICD-10-CM

## 2020-02-18 PROCEDURE — 99024 POSTOP FOLLOW-UP VISIT: CPT | Performed by: SURGERY

## 2020-02-18 ASSESSMENT — MIFFLIN-ST. JEOR: SCORE: 1912.14

## 2020-02-18 NOTE — PROGRESS NOTES
Surgery    Patient returns for hospital follow-up regarding a large perforated ulcer and readmittance for an abdominal abscess status post IR drain.  She is now at a TCU and describes that she has been feeling about the same since dismissal.  She denies having severe abdominal pain and states her pain in her abdomen is much improved prior to her previous hospitalization.  She has been tolerating full liquids without nausea.  Her J-tube is clogged but will be unclogged when she returns.  She has not had any fevers.  She denies any significant abdominal pain today.  She has multiple medical concerns including ongoing shortness of breath which is being worked up by her medical doctor at the facility.    Abdomen- midline wound healing well.  Small suture cut.  No tracking.  All drains in place.  No significant tenderness throughout.  No distention.    A/P  Seems to be doing well from a surgical standpoint.  No significant abdominal pain or distention today.  Drains with appropriate output.   -Clamp NG tube and put to gravity if nausea or pain  -Advance diet to soft and supplement with boost  -Repeat CT abdomen pelvis with contrast through G-tube in next week for follow up on abscess  -Continue daily wet-to-dry dressing changes on midline wound.  -Continue medical work-up for ongoing medical issues including shortness of breath.  -Follow-up in 2-4 weeks.  I will call facility if abnormal findings are present on CT.    Ayan Lopez M.D.  [facility]  [phone]

## 2020-02-19 ENCOUNTER — APPOINTMENT (OUTPATIENT)
Dept: GENERAL RADIOLOGY | Facility: CLINIC | Age: 65
End: 2020-02-19
Attending: EMERGENCY MEDICINE
Payer: MEDICARE

## 2020-02-19 ENCOUNTER — APPOINTMENT (OUTPATIENT)
Dept: CT IMAGING | Facility: CLINIC | Age: 65
End: 2020-02-19
Attending: EMERGENCY MEDICINE
Payer: MEDICARE

## 2020-02-19 ENCOUNTER — APPOINTMENT (OUTPATIENT)
Dept: INTERVENTIONAL RADIOLOGY/VASCULAR | Facility: CLINIC | Age: 65
End: 2020-02-19
Attending: EMERGENCY MEDICINE
Payer: MEDICARE

## 2020-02-19 ENCOUNTER — HOSPITAL ENCOUNTER (EMERGENCY)
Facility: CLINIC | Age: 65
Discharge: HOME | End: 2020-02-19
Attending: EMERGENCY MEDICINE | Admitting: RADIOLOGY
Payer: MEDICARE

## 2020-02-19 VITALS
DIASTOLIC BLOOD PRESSURE: 72 MMHG | SYSTOLIC BLOOD PRESSURE: 128 MMHG | HEART RATE: 82 BPM | RESPIRATION RATE: 18 BRPM | OXYGEN SATURATION: 91 %

## 2020-02-19 DIAGNOSIS — K94.13 MALFUNCTIONING JEJUNOSTOMY TUBE (H): ICD-10-CM

## 2020-02-19 LAB
ANION GAP SERPL CALCULATED.3IONS-SCNC: 4 MMOL/L (ref 3–14)
BASE EXCESS BLDV CALC-SCNC: 16.8 MMOL/L
BASOPHILS # BLD AUTO: 0 10E9/L (ref 0–0.2)
BASOPHILS NFR BLD AUTO: 0.4 %
BUN SERPL-MCNC: 15 MG/DL (ref 7–30)
CALCIUM SERPL-MCNC: 11.2 MG/DL (ref 8.5–10.1)
CHLORIDE SERPL-SCNC: 94 MMOL/L (ref 94–109)
CO2 SERPL-SCNC: 38 MMOL/L (ref 20–32)
CREAT SERPL-MCNC: 0.64 MG/DL (ref 0.52–1.04)
DIFFERENTIAL METHOD BLD: ABNORMAL
EOSINOPHIL # BLD AUTO: 0.2 10E9/L (ref 0–0.7)
EOSINOPHIL NFR BLD AUTO: 1.9 %
ERYTHROCYTE [DISTWIDTH] IN BLOOD BY AUTOMATED COUNT: 15.2 % (ref 10–15)
GFR SERPL CREATININE-BSD FRML MDRD: >90 ML/MIN/{1.73_M2}
GLUCOSE SERPL-MCNC: 78 MG/DL (ref 70–99)
HCO3 BLDV-SCNC: 44 MMOL/L (ref 21–28)
HCT VFR BLD AUTO: 35.1 % (ref 35–47)
HGB BLD-MCNC: 10.6 G/DL (ref 11.7–15.7)
IMM GRANULOCYTES # BLD: 0 10E9/L (ref 0–0.4)
IMM GRANULOCYTES NFR BLD: 0.1 %
LYMPHOCYTES # BLD AUTO: 1.5 10E9/L (ref 0.8–5.3)
LYMPHOCYTES NFR BLD AUTO: 13 %
MCH RBC QN AUTO: 24.8 PG (ref 26.5–33)
MCHC RBC AUTO-ENTMCNC: 30.2 G/DL (ref 31.5–36.5)
MCV RBC AUTO: 82 FL (ref 78–100)
MONOCYTES # BLD AUTO: 0.8 10E9/L (ref 0–1.3)
MONOCYTES NFR BLD AUTO: 6.9 %
NEUTROPHILS # BLD AUTO: 8.8 10E9/L (ref 1.6–8.3)
NEUTROPHILS NFR BLD AUTO: 77.7 %
NRBC # BLD AUTO: 0 10*3/UL
NRBC BLD AUTO-RTO: 0 /100
OXYHGB MFR BLDV: 15 %
PCO2 BLDV: 63 MM HG (ref 40–50)
PH BLDV: 7.45 PH (ref 7.32–7.43)
PLATELET # BLD AUTO: 365 10E9/L (ref 150–450)
PO2 BLDV: 14 MM HG (ref 25–47)
POTASSIUM SERPL-SCNC: 3.5 MMOL/L (ref 3.4–5.3)
RBC # BLD AUTO: 4.27 10E12/L (ref 3.8–5.2)
SODIUM SERPL-SCNC: 136 MMOL/L (ref 133–144)
WBC # BLD AUTO: 11.3 10E9/L (ref 4–11)

## 2020-02-19 PROCEDURE — 82805 BLOOD GASES W/O2 SATURATION: CPT | Performed by: EMERGENCY MEDICINE

## 2020-02-19 PROCEDURE — 25000128 H RX IP 250 OP 636: Performed by: EMERGENCY MEDICINE

## 2020-02-19 PROCEDURE — 94640 AIRWAY INHALATION TREATMENT: CPT

## 2020-02-19 PROCEDURE — 85025 COMPLETE CBC W/AUTO DIFF WBC: CPT | Performed by: EMERGENCY MEDICINE

## 2020-02-19 PROCEDURE — 99285 EMERGENCY DEPT VISIT HI MDM: CPT | Mod: 25

## 2020-02-19 PROCEDURE — 27211065 ZZ H TUBE GASTRO CR10

## 2020-02-19 PROCEDURE — 25000132 ZZH RX MED GY IP 250 OP 250 PS 637: Mod: GY | Performed by: EMERGENCY MEDICINE

## 2020-02-19 PROCEDURE — 80048 BASIC METABOLIC PNL TOTAL CA: CPT | Performed by: EMERGENCY MEDICINE

## 2020-02-19 PROCEDURE — 25000125 ZZHC RX 250: Performed by: EMERGENCY MEDICINE

## 2020-02-19 PROCEDURE — 71046 X-RAY EXAM CHEST 2 VIEWS: CPT

## 2020-02-19 PROCEDURE — 71275 CT ANGIOGRAPHY CHEST: CPT

## 2020-02-19 PROCEDURE — C1769 GUIDE WIRE: HCPCS

## 2020-02-19 PROCEDURE — 49451 REPLACE DUOD/JEJ TUBE PERC: CPT

## 2020-02-19 RX ORDER — OXYCODONE HYDROCHLORIDE 5 MG/1
5 TABLET ORAL EVERY 4 HOURS PRN
Status: ON HOLD | COMMUNITY
End: 2020-03-16

## 2020-02-19 RX ORDER — IOPAMIDOL 755 MG/ML
83 INJECTION, SOLUTION INTRAVASCULAR ONCE
Status: COMPLETED | OUTPATIENT
Start: 2020-02-19 | End: 2020-02-19

## 2020-02-19 RX ORDER — IPRATROPIUM BROMIDE AND ALBUTEROL SULFATE 2.5; .5 MG/3ML; MG/3ML
3 SOLUTION RESPIRATORY (INHALATION) ONCE
Status: COMPLETED | OUTPATIENT
Start: 2020-02-19 | End: 2020-02-19

## 2020-02-19 RX ORDER — SOD PHOS DI, MONO/K PHOS MONO 250 MG
1 TABLET ORAL 4 TIMES DAILY
COMMUNITY
End: 2020-03-13

## 2020-02-19 RX ORDER — GABAPENTIN 300 MG/1
600 CAPSULE ORAL ONCE
Status: COMPLETED | OUTPATIENT
Start: 2020-02-19 | End: 2020-02-19

## 2020-02-19 RX ORDER — OXYCODONE HYDROCHLORIDE 5 MG/1
10 TABLET ORAL ONCE
Status: COMPLETED | OUTPATIENT
Start: 2020-02-19 | End: 2020-02-19

## 2020-02-19 RX ORDER — ROSUVASTATIN CALCIUM 5 MG/1
5 TABLET, COATED ORAL AT BEDTIME
COMMUNITY
End: 2020-04-08

## 2020-02-19 RX ORDER — OXYCODONE HYDROCHLORIDE 5 MG/1
20 TABLET ORAL ONCE
Status: DISCONTINUED | OUTPATIENT
Start: 2020-02-19 | End: 2020-02-19

## 2020-02-19 RX ADMIN — IOPAMIDOL 83 ML: 755 INJECTION, SOLUTION INTRAVENOUS at 19:13

## 2020-02-19 RX ADMIN — GABAPENTIN 600 MG: 300 CAPSULE ORAL at 19:56

## 2020-02-19 RX ADMIN — SODIUM CHLORIDE 100 ML: 9 INJECTION, SOLUTION INTRAVENOUS at 19:12

## 2020-02-19 RX ADMIN — IPRATROPIUM BROMIDE AND ALBUTEROL SULFATE 3 ML: .5; 3 SOLUTION RESPIRATORY (INHALATION) at 20:06

## 2020-02-19 RX ADMIN — OXYCODONE HYDROCHLORIDE 10 MG: 5 TABLET ORAL at 12:55

## 2020-02-19 RX ADMIN — IPRATROPIUM BROMIDE AND ALBUTEROL SULFATE 3 ML: .5; 3 SOLUTION RESPIRATORY (INHALATION) at 12:58

## 2020-02-19 ASSESSMENT — ENCOUNTER SYMPTOMS
SHORTNESS OF BREATH: 1
FEVER: 0
COUGH: 0

## 2020-02-19 NOTE — ED NOTES
Bed: ED24  Expected date:   Expected time:   Means of arrival:   Comments:  pramod Renner F clogged J tube eta 1200

## 2020-02-19 NOTE — ED PROVIDER NOTES
History     Chief Complaint:  J-tube Problem    HPI   Bhavani White is a 64 year old wheel chair bound female with a history of asthma who presents via EMS from the [facility] in [place] for evaluation of a clogged J tube. Per chart review, the patient was hospitalized from 1/25/2020-2/12/2020 for an abdominopelvic abscess. Per chart review, on 12/29/2019, the patient had a perforated duodenal ulcer, s/p exploratory laparotomy, TRUONG, Malecot drain placement with perforation, and wound VAC placement. On 1/1/2020, she had a repeat exploratory laparotomy, TRUONG, closure of duodenal perforation over Malecot,and G-tube/J-tube placements. The patient had a follow-up appointment with her surgeon, Dr. Ayan Lopez. She was scheduled for a CT Abdomen Pelvis for 2 weeks.     Here, the patient reports her J-tube has been clogged for 2 days and flushing has been attempted, prompting her presentation today. She has otherwise been taking in foods orally. Now, she endorses hip and gluteal pain. She has been using 3-4 times nebulizer's with relief of her asthma related shortness of breath, with her last treatment being this morning. She states she has been on steroids for her asthma in many years ago. She is not on oxygen at home. No recent fevers, cough, or chest pain.       Allergies:  Povidone Iodine  Toradol [Ketorolac]  Tramadol     Medications:    Albuterol   Amlodipine  Dulcolax  Colace  Cymbalta  Lasix  Gabapentin   Novolog pen  Lantus pen  Levothryroxine  Dolophine  Oxycodone  Protonix  Potassium chloride   Zocor  Trazodone  Ventolin inhaler  Senna-docusate  Methodone    Past Medical History:    Anxiety   Asthma   Depression   DM (diabetes mellitus)    Frequent UTI   History of blood transfusion   History of ulcer disease   Hypertension   Hypothyroid   Iatrogenic Cushing's disease    Infection due to alpha-hemolytic Streptococcus   Morbid obesity   MRSA (methicillin resistant staph aureus) culture  positive   Osteoarthritis   Other chronic pain   Sleep apnea   Hyperlipidemia   Diffuse idiopathic skeletal hyperostosis  Septic shock   Recurrent UTI  Fibromyalgia  Ventral hernia  Perforated abdominal ulcer  Acute blood loss anemia  Acute kidney injury  NSTEMI  Infection due to Candida glabrata  MRSA  Abdominopelvic abscess     Past Surgical History:    Abdomen surgery   Apply wound vac  Laminectomy, facetectomy, lumbar  Total knee arthroplasty, left  Close secondary wound abdomen  Cystoscopy   Cystoscopy flexible, cystostomy, insert tube suprapubic, combined  Cystoscopy, intravesical injection x2  Discectomy, fusion cervical anterior three+ levels  Gastrostomy, insert tube, combined  Suprapubic catheter placement  Hernia repair  Inject botox  Hysterecotmy   IR jejunostomy tube change  Laparotomy diagnostic  Laparotomy exploratory   Bilateral cataract surgery   Left carpal tunnel release  Tonsillectomy     Family History:    Son: depression  Mother: hypertension, heart disease, depression   Father: hypertension, connective tissue disorder, colon cancer, prostate cancer, bladder cancer  Sister: depression    Social History:  The patient was unaccompanied to the ED.  Smoking Status: Never Smoker  Smokeless Tobacco: Never Used  Alcohol Use: Negative   Drug Use: Positive, medical canibis  Marital Status:   [2]     Review of Systems   Constitutional: Negative for fever.   Respiratory: Positive for shortness of breath. Negative for cough.    Cardiovascular: Negative for chest pain.   Musculoskeletal:        Hip pain   Gluteal pain   All other systems reviewed and are negative.      Physical Exam     Patient Vitals for the past 24 hrs:   BP Pulse Resp SpO2   02/19/20 1209 126/76 87 18 91 %        Physical Exam  General: Alert and cooperative with exam. Patient in mild distress. Normal mentation.  Head:  Scalp is NC/AT  Eyes:  No scleral icterus, PERRL  ENT:  The external nose and ears are normal. The oropharynx is  normal and without erythema; mucus membranes are moist.   Neck:  Normal range of motion without rigidity.  CV:  Regular rate and rhythm  Resp:  Breath sounds are clear bilaterally    Non-labored, no retractions or accessory muscle use  GI:  Abdomen is soft, no distension, no tenderness. No peritoneal signs. Morbidly obese. RUQ drain, J tube, and G tube all C/D/I.  MS:  No lower extremity edema   Skin:  Warm and dry, No rash or lesions noted.  Neuro: Oriented x 3. No gross motor deficits.    Emergency Department Course     Imaging:  Radiographic findings were communicated with the patient who voiced understanding of the findings.    Chest XR, PA & LAT  IMPRESSION: Small to moderate bilateral pleural effusions. Hazy  bibasilar airspace opacities may represent atelectasis or pneumonia. Reading per radiology.     Interventions:  1258 Duoneb 3 mL Nebulization  1255 Oxycodone 10 mg PO     Emergency Department Course:   Past medical records, nursing notes, and vitals reviewed.  1208: I performed an exam of the patient and obtained history, as documented above.    The patient was sent for a Chest XR, PA & LAT while in the emergency department, results above.      Medication administered.     1233 I spoke with Dr. Ace, ANA, regarding the patient.     1237 I rechecked and updated the patient.     1455 I rechecked the patient.     Scheduled for J-tube replacement with IR.    The [facility]    Impression & Plan      Medical Decision Making:  Patient is a 64-year-old female presents from skilled care facility due to concern for clogged J-tube.  Patient's medical history and records were reviewed.  Case was discussed with Dr. Ace of IR who will replace J-tube.  Patient did note chronic/persistent shortness of breath and has history of asthma; noted improvement with nebulizer in the ED; continue home breathing treatments as needed.  She was provided  nebulizer treatment.  X-ray shows mild bilateral pleural effusions with atelectasis versus infiltrate; favor atelectasis given lack of infectious symptoms.  Patient be signed out to my partner Dr. Kirk pending completion of J-tube replacement.  No indication for further labs or imaging in the ED.  She was provided oxycodone for chronic pain.  Patient can be discharged back to her skilled care facility following successful replacement of J-tube.    Diagnosis:    ICD-10-CM    1. Malfunctioning jejunostomy tube (H) K94.13        Disposition:  Signed out to Dr. Kirk    Scribe Disclosure:  I, Rosalva Karimi, am serving as a scribe on 2/19/2020 at 12:07 PM to personally document services performed by Hernán Mota DO based on my observations and the provider's statements to me.      Rosalva Karimi  2/19/2020    EMERGENCY DEPARTMENT       Hernán Mota DO  02/19/20 5415

## 2020-02-19 NOTE — ED NOTES
Pt left to IR; pt was told many time she will not being getting more narcotics; pt has but her call light on, on average every 10 min. Most requests are for pain management, repositioning and Ice chips

## 2020-02-19 NOTE — ED AVS SNAPSHOT
Emergency Department  [address] [place] [id number]  Phone:  [phone]  Fax:  [phone]                                    Bhavani White   MRN: [id number]    Department:   Emergency Department   Date of Visit:  2/19/2020           After Visit Summary Signature Page    I have received my discharge instructions, and my questions have been answered. I have discussed any challenges I see with this plan with the nurse or doctor.    ..........................................................................................................................................  Patient/Patient Representative Signature      ..........................................................................................................................................  Patient Representative Print Name and Relationship to Patient    ..................................................               ................................................  Date                                   Time    ..........................................................................................................................................  Reviewed by Signature/Title    ...................................................              ..............................................  Date                                               Time          22EPIC Rev 08/18

## 2020-02-19 NOTE — ED NOTES
Patient requested pain meds. Rated pain on 8/10. States she has had chronic back pain since 1981. Currently takes 20mg oxycodone daily and 10 mg methadone 4x a day.

## 2020-02-20 ENCOUNTER — PATIENT OUTREACH (OUTPATIENT)
Dept: CARE COORDINATION | Facility: CLINIC | Age: 65
End: 2020-02-20

## 2020-02-20 NOTE — ED PROVIDER NOTES
Signed out to me by Dr. Mota.  Her G-tube is which without complication, but I was alerted by the astute nursing team that her oxygen levels have dropped to the high 80s.  Patient initially stated that she does not use home oxygen, but stated that she had no additional symptoms of did not feel short of breath.  We had some issues with the pulse ox, but I reliably getting low 90s on multiple sites with good waveform.  On the third time I asked, the patient did then tell me she was actually on home oxygen since her surgery, and is always on 2 L at her TCU.  With this additional piece of information, and with a negative CT scan here, which I did because I was worried about her, I now feel very comfortable having her go back to her TCU with her normal 2 L of oxygen.     Walt Kirk MD  02/19/20 2040

## 2020-02-21 ENCOUNTER — TELEPHONE (OUTPATIENT)
Dept: SURGERY | Facility: CLINIC | Age: 65
End: 2020-02-21

## 2020-02-21 NOTE — TELEPHONE ENCOUNTER
Kalyn at TCU wanting clarification on whether or not Dr. Lopez is wanting tube feeds to continue or if patient should be getting all nutrition through soft diet and boost supplemation.    Dr. Lopez out of office. However, his recent dictated office visit note recommends soft food with boost supplementation.    Kalyn reports that patient has been eating adequate amount of food PO and she feels she is getting appropriate caloric intake.    Informed her to allow patient to eat the soft diet with boost supplementation and if they are concerned that she is not getting enough calories, then feeding tube should be used.    Will inform Dr. Lopez of this and call TCU back if he has any additional recommendations regarding the feeding tube.    Deb Glover, RN-BSN

## 2020-03-02 ENCOUNTER — HOSPITAL ENCOUNTER (OUTPATIENT)
Dept: CT IMAGING | Facility: CLINIC | Age: 65
Discharge: HOME | End: 2020-03-02
Attending: SURGERY | Admitting: SURGERY
Payer: MEDICARE

## 2020-03-02 DIAGNOSIS — Z09 SURGERY FOLLOW-UP EXAMINATION: ICD-10-CM

## 2020-03-02 PROCEDURE — 25000125 ZZHC RX 250: Performed by: SURGERY

## 2020-03-02 PROCEDURE — 25000128 H RX IP 250 OP 636: Performed by: SURGERY

## 2020-03-02 PROCEDURE — 74178 CT ABD&PLV WO CNTR FLWD CNTR: CPT

## 2020-03-02 RX ORDER — IOPAMIDOL 755 MG/ML
135 INJECTION, SOLUTION INTRAVASCULAR ONCE
Status: COMPLETED | OUTPATIENT
Start: 2020-03-02 | End: 2020-03-02

## 2020-03-02 RX ADMIN — IOPAMIDOL 135 ML: 755 INJECTION, SOLUTION INTRAVENOUS at 14:13

## 2020-03-02 RX ADMIN — SODIUM CHLORIDE 72 ML: 9 INJECTION, SOLUTION INTRAVENOUS at 14:24

## 2020-03-10 ENCOUNTER — OFFICE VISIT (OUTPATIENT)
Dept: SURGERY | Facility: CLINIC | Age: 65
End: 2020-03-10
Payer: MEDICARE

## 2020-03-10 DIAGNOSIS — Z09 SURGERY FOLLOW-UP EXAMINATION: Primary | ICD-10-CM

## 2020-03-10 PROCEDURE — 99024 POSTOP FOLLOW-UP VISIT: CPT | Performed by: SURGERY

## 2020-03-13 ENCOUNTER — HOSPITAL ENCOUNTER (INPATIENT)
Facility: CLINIC | Age: 65
LOS: 3 days | Discharge: NURSING, REHAB OR LONG-TERM CARE | DRG: 871 | End: 2020-03-16
Attending: EMERGENCY MEDICINE | Admitting: HOSPITALIST
Payer: MEDICARE

## 2020-03-13 ENCOUNTER — APPOINTMENT (OUTPATIENT)
Dept: CT IMAGING | Facility: CLINIC | Age: 65
DRG: 871 | End: 2020-03-13
Attending: EMERGENCY MEDICINE
Payer: MEDICARE

## 2020-03-13 DIAGNOSIS — R82.90 ABNORMAL URINALYSIS: ICD-10-CM

## 2020-03-13 DIAGNOSIS — Z79.899 CONTROLLED SUBSTANCE AGREEMENT SIGNED: Primary | ICD-10-CM

## 2020-03-13 DIAGNOSIS — D64.9 ANEMIA, UNSPECIFIED TYPE: ICD-10-CM

## 2020-03-13 DIAGNOSIS — J96.01 ACUTE RESPIRATORY FAILURE WITH HYPOXIA (H): ICD-10-CM

## 2020-03-13 DIAGNOSIS — K65.1 INTRA-ABDOMINAL ABSCESS (H): ICD-10-CM

## 2020-03-13 DIAGNOSIS — E87.1 HYPONATREMIA: ICD-10-CM

## 2020-03-13 DIAGNOSIS — M79.7 FIBROMYALGIA: ICD-10-CM

## 2020-03-13 DIAGNOSIS — B99.9 INTRA-ABDOMINAL INFECTION: ICD-10-CM

## 2020-03-13 DIAGNOSIS — E11.21 TYPE 2 DIABETES MELLITUS WITH DIABETIC NEPHROPATHY, WITHOUT LONG-TERM CURRENT USE OF INSULIN (H): ICD-10-CM

## 2020-03-13 LAB
ALBUMIN SERPL-MCNC: 2.3 G/DL (ref 3.4–5)
ALBUMIN UR-MCNC: 30 MG/DL
ALP SERPL-CCNC: 133 U/L (ref 40–150)
ALT SERPL W P-5'-P-CCNC: 24 U/L (ref 0–50)
ANION GAP SERPL CALCULATED.3IONS-SCNC: 8 MMOL/L (ref 3–14)
APPEARANCE UR: CLEAR
AST SERPL W P-5'-P-CCNC: 33 U/L (ref 0–45)
BACTERIA #/AREA URNS HPF: ABNORMAL /HPF
BASOPHILS # BLD AUTO: 0 10E9/L (ref 0–0.2)
BASOPHILS NFR BLD AUTO: 0.1 %
BILIRUB SERPL-MCNC: 0.5 MG/DL (ref 0.2–1.3)
BILIRUB UR QL STRIP: NEGATIVE
BUN SERPL-MCNC: 10 MG/DL (ref 7–30)
CALCIUM SERPL-MCNC: 9.6 MG/DL (ref 8.5–10.1)
CHLORIDE SERPL-SCNC: 89 MMOL/L (ref 94–109)
CO2 SERPL-SCNC: 30 MMOL/L (ref 20–32)
COLOR UR AUTO: YELLOW
CREAT BLD-MCNC: 0.6 MG/DL (ref 0.52–1.04)
CREAT SERPL-MCNC: 0.61 MG/DL (ref 0.52–1.04)
DIFFERENTIAL METHOD BLD: ABNORMAL
EOSINOPHIL # BLD AUTO: 0 10E9/L (ref 0–0.7)
EOSINOPHIL NFR BLD AUTO: 0 %
ERYTHROCYTE [DISTWIDTH] IN BLOOD BY AUTOMATED COUNT: 16.2 % (ref 10–15)
GFR SERPL CREATININE-BSD FRML MDRD: >90 ML/MIN/{1.73_M2}
GFR SERPL CREATININE-BSD FRML MDRD: >90 ML/MIN/{1.73_M2}
GLUCOSE SERPL-MCNC: 164 MG/DL (ref 70–99)
GLUCOSE UR STRIP-MCNC: NEGATIVE MG/DL
HCT VFR BLD AUTO: 31.3 % (ref 35–47)
HGB BLD-MCNC: 9.6 G/DL (ref 11.7–15.7)
HGB UR QL STRIP: NEGATIVE
HYALINE CASTS #/AREA URNS LPF: 1 /LPF (ref 0–2)
IMM GRANULOCYTES # BLD: 0.1 10E9/L (ref 0–0.4)
IMM GRANULOCYTES NFR BLD: 0.3 %
KETONES UR STRIP-MCNC: NEGATIVE MG/DL
LACTATE BLD-SCNC: 1.5 MMOL/L (ref 0.7–2)
LEUKOCYTE ESTERASE UR QL STRIP: ABNORMAL
LIPASE SERPL-CCNC: 17 U/L (ref 73–393)
LYMPHOCYTES # BLD AUTO: 1 10E9/L (ref 0.8–5.3)
LYMPHOCYTES NFR BLD AUTO: 4.6 %
MCH RBC QN AUTO: 22.8 PG (ref 26.5–33)
MCHC RBC AUTO-ENTMCNC: 30.7 G/DL (ref 31.5–36.5)
MCV RBC AUTO: 74 FL (ref 78–100)
MONOCYTES # BLD AUTO: 2.5 10E9/L (ref 0–1.3)
MONOCYTES NFR BLD AUTO: 12 %
MUCOUS THREADS #/AREA URNS LPF: PRESENT /LPF
NEUTROPHILS # BLD AUTO: 17 10E9/L (ref 1.6–8.3)
NEUTROPHILS NFR BLD AUTO: 83 %
NITRATE UR QL: NEGATIVE
PH UR STRIP: 6.5 PH (ref 5–7)
PLATELET # BLD AUTO: 458 10E9/L (ref 150–450)
POTASSIUM SERPL-SCNC: 3.4 MMOL/L (ref 3.4–5.3)
PROT SERPL-MCNC: 7.1 G/DL (ref 6.8–8.8)
RBC # BLD AUTO: 4.21 10E12/L (ref 3.8–5.2)
RBC #/AREA URNS AUTO: 6 /HPF (ref 0–2)
SODIUM SERPL-SCNC: 127 MMOL/L (ref 133–144)
SOURCE: ABNORMAL
SP GR UR STRIP: 1.02 (ref 1–1.03)
UROBILINOGEN UR STRIP-MCNC: 2 MG/DL (ref 0–2)
WBC # BLD AUTO: 20.5 10E9/L (ref 4–11)
WBC #/AREA URNS AUTO: 15 /HPF (ref 0–5)

## 2020-03-13 PROCEDURE — 87077 CULTURE AEROBIC IDENTIFY: CPT | Performed by: EMERGENCY MEDICINE

## 2020-03-13 PROCEDURE — 83605 ASSAY OF LACTIC ACID: CPT | Performed by: EMERGENCY MEDICINE

## 2020-03-13 PROCEDURE — 12000000 ZZH R&B MED SURG/OB

## 2020-03-13 PROCEDURE — 83690 ASSAY OF LIPASE: CPT | Performed by: EMERGENCY MEDICINE

## 2020-03-13 PROCEDURE — 25000125 ZZHC RX 250: Performed by: EMERGENCY MEDICINE

## 2020-03-13 PROCEDURE — 82565 ASSAY OF CREATININE: CPT

## 2020-03-13 PROCEDURE — 87186 SC STD MICRODIL/AGAR DIL: CPT | Performed by: EMERGENCY MEDICINE

## 2020-03-13 PROCEDURE — 80053 COMPREHEN METABOLIC PANEL: CPT | Performed by: EMERGENCY MEDICINE

## 2020-03-13 PROCEDURE — 87106 FUNGI IDENTIFICATION YEAST: CPT | Performed by: EMERGENCY MEDICINE

## 2020-03-13 PROCEDURE — 81001 URINALYSIS AUTO W/SCOPE: CPT | Performed by: EMERGENCY MEDICINE

## 2020-03-13 PROCEDURE — 87040 BLOOD CULTURE FOR BACTERIA: CPT | Performed by: EMERGENCY MEDICINE

## 2020-03-13 PROCEDURE — 87181 SC STD AGAR DILUTION PER AGT: CPT | Performed by: EMERGENCY MEDICINE

## 2020-03-13 PROCEDURE — 25800030 ZZH RX IP 258 OP 636: Performed by: EMERGENCY MEDICINE

## 2020-03-13 PROCEDURE — 99223 1ST HOSP IP/OBS HIGH 75: CPT | Mod: AI | Performed by: HOSPITALIST

## 2020-03-13 PROCEDURE — 25000128 H RX IP 250 OP 636: Performed by: EMERGENCY MEDICINE

## 2020-03-13 PROCEDURE — 85025 COMPLETE CBC W/AUTO DIFF WBC: CPT | Performed by: EMERGENCY MEDICINE

## 2020-03-13 PROCEDURE — 74177 CT ABD & PELVIS W/CONTRAST: CPT

## 2020-03-13 PROCEDURE — 87088 URINE BACTERIA CULTURE: CPT | Performed by: EMERGENCY MEDICINE

## 2020-03-13 PROCEDURE — 96375 TX/PRO/DX INJ NEW DRUG ADDON: CPT

## 2020-03-13 PROCEDURE — 87086 URINE CULTURE/COLONY COUNT: CPT | Performed by: EMERGENCY MEDICINE

## 2020-03-13 PROCEDURE — 96365 THER/PROPH/DIAG IV INF INIT: CPT

## 2020-03-13 PROCEDURE — 99285 EMERGENCY DEPT VISIT HI MDM: CPT | Mod: 25

## 2020-03-13 PROCEDURE — 87070 CULTURE OTHR SPECIMN AEROBIC: CPT | Performed by: EMERGENCY MEDICINE

## 2020-03-13 RX ORDER — PANTOPRAZOLE SODIUM 40 MG/1
1 FOR SUSPENSION ORAL 2 TIMES DAILY
COMMUNITY
End: 2020-05-18

## 2020-03-13 RX ORDER — IPRATROPIUM BROMIDE AND ALBUTEROL SULFATE 2.5; .5 MG/3ML; MG/3ML
1 SOLUTION RESPIRATORY (INHALATION) EVERY 6 HOURS PRN
COMMUNITY
End: 2023-01-01

## 2020-03-13 RX ORDER — ACETAMINOPHEN 325 MG/1
650 TABLET ORAL EVERY 4 HOURS PRN
COMMUNITY
End: 2020-05-18

## 2020-03-13 RX ORDER — BISACODYL 10 MG
10 SUPPOSITORY, RECTAL RECTAL 2 TIMES DAILY PRN
COMMUNITY
End: 2023-01-01

## 2020-03-13 RX ORDER — IOPAMIDOL 755 MG/ML
83 INJECTION, SOLUTION INTRAVASCULAR ONCE
Status: COMPLETED | OUTPATIENT
Start: 2020-03-13 | End: 2020-03-13

## 2020-03-13 RX ORDER — DOXYCYCLINE HYCLATE 100 MG
100 TABLET ORAL 2 TIMES DAILY
Status: ON HOLD | COMMUNITY
Start: 2020-03-12 | End: 2020-03-16

## 2020-03-13 RX ORDER — PIPERACILLIN SODIUM, TAZOBACTAM SODIUM 3; .375 G/15ML; G/15ML
3.38 INJECTION, POWDER, LYOPHILIZED, FOR SOLUTION INTRAVENOUS ONCE
Status: COMPLETED | OUTPATIENT
Start: 2020-03-13 | End: 2020-03-14

## 2020-03-13 RX ADMIN — IOPAMIDOL 83 ML: 755 INJECTION, SOLUTION INTRAVENOUS at 22:49

## 2020-03-13 RX ADMIN — SODIUM CHLORIDE 100 ML: 9 INJECTION, SOLUTION INTRAVENOUS at 22:49

## 2020-03-13 RX ADMIN — PIPERACILLIN AND TAZOBACTAM 3.38 G: 3; .375 INJECTION, POWDER, LYOPHILIZED, FOR SOLUTION INTRAVENOUS at 23:29

## 2020-03-13 RX ADMIN — SODIUM CHLORIDE 1000 ML: 9 INJECTION, SOLUTION INTRAVENOUS at 23:31

## 2020-03-13 RX ADMIN — HYDROMORPHONE HYDROCHLORIDE 1 MG: 1 INJECTION, SOLUTION INTRAMUSCULAR; INTRAVENOUS; SUBCUTANEOUS at 22:03

## 2020-03-14 ENCOUNTER — APPOINTMENT (OUTPATIENT)
Dept: CARDIOLOGY | Facility: CLINIC | Age: 65
DRG: 871 | End: 2020-03-14
Attending: HOSPITALIST
Payer: MEDICARE

## 2020-03-14 PROBLEM — K65.1 INTRA-ABDOMINAL ABSCESS (H): Status: ACTIVE | Noted: 2020-03-14

## 2020-03-14 LAB
ALBUMIN SERPL-MCNC: 2 G/DL (ref 3.4–5)
ALP SERPL-CCNC: 121 U/L (ref 40–150)
ALT SERPL W P-5'-P-CCNC: 24 U/L (ref 0–50)
ANION GAP SERPL CALCULATED.3IONS-SCNC: 5 MMOL/L (ref 3–14)
AST SERPL W P-5'-P-CCNC: 34 U/L (ref 0–45)
BASOPHILS # BLD AUTO: 0 10E9/L (ref 0–0.2)
BASOPHILS NFR BLD AUTO: 0.1 %
BILIRUB SERPL-MCNC: 0.4 MG/DL (ref 0.2–1.3)
BUN SERPL-MCNC: 9 MG/DL (ref 7–30)
CALCIUM SERPL-MCNC: 9.8 MG/DL (ref 8.5–10.1)
CHLORIDE SERPL-SCNC: 97 MMOL/L (ref 94–109)
CO2 SERPL-SCNC: 32 MMOL/L (ref 20–32)
CREAT SERPL-MCNC: 0.65 MG/DL (ref 0.52–1.04)
DIFFERENTIAL METHOD BLD: ABNORMAL
EOSINOPHIL # BLD AUTO: 0.1 10E9/L (ref 0–0.7)
EOSINOPHIL NFR BLD AUTO: 0.5 %
ERYTHROCYTE [DISTWIDTH] IN BLOOD BY AUTOMATED COUNT: 16.1 % (ref 10–15)
GFR SERPL CREATININE-BSD FRML MDRD: >90 ML/MIN/{1.73_M2}
GLUCOSE BLDC GLUCOMTR-MCNC: 118 MG/DL (ref 70–99)
GLUCOSE BLDC GLUCOMTR-MCNC: 125 MG/DL (ref 70–99)
GLUCOSE BLDC GLUCOMTR-MCNC: 129 MG/DL (ref 70–99)
GLUCOSE BLDC GLUCOMTR-MCNC: 129 MG/DL (ref 70–99)
GLUCOSE BLDC GLUCOMTR-MCNC: 137 MG/DL (ref 70–99)
GLUCOSE BLDC GLUCOMTR-MCNC: 140 MG/DL (ref 70–99)
GLUCOSE SERPL-MCNC: 127 MG/DL (ref 70–99)
HCT VFR BLD AUTO: 32.1 % (ref 35–47)
HGB BLD-MCNC: 9.6 G/DL (ref 11.7–15.7)
IMM GRANULOCYTES # BLD: 0.1 10E9/L (ref 0–0.4)
IMM GRANULOCYTES NFR BLD: 0.3 %
LYMPHOCYTES # BLD AUTO: 1 10E9/L (ref 0.8–5.3)
LYMPHOCYTES NFR BLD AUTO: 6.8 %
MCH RBC QN AUTO: 22.6 PG (ref 26.5–33)
MCHC RBC AUTO-ENTMCNC: 29.9 G/DL (ref 31.5–36.5)
MCV RBC AUTO: 76 FL (ref 78–100)
MONOCYTES # BLD AUTO: 1.8 10E9/L (ref 0–1.3)
MONOCYTES NFR BLD AUTO: 12.2 %
NEUTROPHILS # BLD AUTO: 11.7 10E9/L (ref 1.6–8.3)
NEUTROPHILS NFR BLD AUTO: 80.1 %
PLATELET # BLD AUTO: 414 10E9/L (ref 150–450)
POTASSIUM SERPL-SCNC: 3.4 MMOL/L (ref 3.4–5.3)
PROT SERPL-MCNC: 6.6 G/DL (ref 6.8–8.8)
RBC # BLD AUTO: 4.24 10E12/L (ref 3.8–5.2)
SODIUM SERPL-SCNC: 134 MMOL/L (ref 133–144)
WBC # BLD AUTO: 14.6 10E9/L (ref 4–11)

## 2020-03-14 PROCEDURE — 25000128 H RX IP 250 OP 636: Performed by: EMERGENCY MEDICINE

## 2020-03-14 PROCEDURE — 93325 DOPPLER ECHO COLOR FLOW MAPG: CPT

## 2020-03-14 PROCEDURE — 85025 COMPLETE CBC W/AUTO DIFF WBC: CPT | Performed by: HOSPITALIST

## 2020-03-14 PROCEDURE — 93308 TTE F-UP OR LMTD: CPT | Mod: 26 | Performed by: INTERNAL MEDICINE

## 2020-03-14 PROCEDURE — 25000132 ZZH RX MED GY IP 250 OP 250 PS 637: Mod: GY | Performed by: HOSPITALIST

## 2020-03-14 PROCEDURE — 96367 TX/PROPH/DG ADDL SEQ IV INF: CPT

## 2020-03-14 PROCEDURE — 80053 COMPREHEN METABOLIC PANEL: CPT | Performed by: HOSPITALIST

## 2020-03-14 PROCEDURE — 93321 DOPPLER ECHO F-UP/LMTD STD: CPT | Mod: 26 | Performed by: INTERNAL MEDICINE

## 2020-03-14 PROCEDURE — 93325 DOPPLER ECHO COLOR FLOW MAPG: CPT | Mod: 26 | Performed by: INTERNAL MEDICINE

## 2020-03-14 PROCEDURE — 36415 COLL VENOUS BLD VENIPUNCTURE: CPT | Performed by: HOSPITALIST

## 2020-03-14 PROCEDURE — 25000132 ZZH RX MED GY IP 250 OP 250 PS 637: Mod: GY | Performed by: STUDENT IN AN ORGANIZED HEALTH CARE EDUCATION/TRAINING PROGRAM

## 2020-03-14 PROCEDURE — 99232 SBSQ HOSP IP/OBS MODERATE 35: CPT | Performed by: STUDENT IN AN ORGANIZED HEALTH CARE EDUCATION/TRAINING PROGRAM

## 2020-03-14 PROCEDURE — C9113 INJ PANTOPRAZOLE SODIUM, VIA: HCPCS | Performed by: HOSPITALIST

## 2020-03-14 PROCEDURE — 25500064 ZZH RX 255 OP 636: Performed by: HOSPITALIST

## 2020-03-14 PROCEDURE — 12000000 ZZH R&B MED SURG/OB

## 2020-03-14 PROCEDURE — 25000128 H RX IP 250 OP 636: Performed by: HOSPITALIST

## 2020-03-14 PROCEDURE — 25800030 ZZH RX IP 258 OP 636: Performed by: EMERGENCY MEDICINE

## 2020-03-14 PROCEDURE — 00000146 ZZHCL STATISTIC GLUCOSE BY METER IP

## 2020-03-14 PROCEDURE — 99221 1ST HOSP IP/OBS SF/LOW 40: CPT | Mod: 24 | Performed by: SURGERY

## 2020-03-14 RX ORDER — LIDOCAINE 40 MG/G
CREAM TOPICAL
Status: DISCONTINUED | OUTPATIENT
Start: 2020-03-14 | End: 2020-03-16 | Stop reason: HOSPADM

## 2020-03-14 RX ORDER — BISACODYL 10 MG
10 SUPPOSITORY, RECTAL RECTAL DAILY PRN
Status: DISCONTINUED | OUTPATIENT
Start: 2020-03-14 | End: 2020-03-16 | Stop reason: HOSPADM

## 2020-03-14 RX ORDER — AMLODIPINE BESYLATE 5 MG/1
5 TABLET ORAL DAILY
Status: DISCONTINUED | OUTPATIENT
Start: 2020-03-14 | End: 2020-03-16 | Stop reason: HOSPADM

## 2020-03-14 RX ORDER — PIPERACILLIN SODIUM, TAZOBACTAM SODIUM 3; .375 G/15ML; G/15ML
3.38 INJECTION, POWDER, LYOPHILIZED, FOR SOLUTION INTRAVENOUS EVERY 6 HOURS
Status: DISCONTINUED | OUTPATIENT
Start: 2020-03-14 | End: 2020-03-15

## 2020-03-14 RX ORDER — ONDANSETRON 4 MG/1
4 TABLET, ORALLY DISINTEGRATING ORAL EVERY 6 HOURS PRN
Status: DISCONTINUED | OUTPATIENT
Start: 2020-03-14 | End: 2020-03-16 | Stop reason: HOSPADM

## 2020-03-14 RX ORDER — GABAPENTIN 800 MG/1
1600 TABLET ORAL 3 TIMES DAILY
Status: DISCONTINUED | OUTPATIENT
Start: 2020-03-14 | End: 2020-03-16 | Stop reason: HOSPADM

## 2020-03-14 RX ORDER — HYDROMORPHONE HYDROCHLORIDE 1 MG/ML
.2-.3 INJECTION, SOLUTION INTRAMUSCULAR; INTRAVENOUS; SUBCUTANEOUS
Status: DISCONTINUED | OUTPATIENT
Start: 2020-03-14 | End: 2020-03-16 | Stop reason: HOSPADM

## 2020-03-14 RX ORDER — ACETAMINOPHEN 325 MG/1
650 TABLET ORAL EVERY 4 HOURS PRN
Status: DISCONTINUED | OUTPATIENT
Start: 2020-03-14 | End: 2020-03-16 | Stop reason: HOSPADM

## 2020-03-14 RX ORDER — TRAZODONE HYDROCHLORIDE 100 MG/1
100 TABLET ORAL AT BEDTIME
Status: DISCONTINUED | OUTPATIENT
Start: 2020-03-14 | End: 2020-03-16 | Stop reason: HOSPADM

## 2020-03-14 RX ORDER — METHADONE HYDROCHLORIDE 10 MG/1
20 TABLET ORAL 2 TIMES DAILY
Status: DISCONTINUED | OUTPATIENT
Start: 2020-03-14 | End: 2020-03-16 | Stop reason: HOSPADM

## 2020-03-14 RX ORDER — LANOLIN ALCOHOL/MO/W.PET/CERES
3 CREAM (GRAM) TOPICAL
Status: DISCONTINUED | OUTPATIENT
Start: 2020-03-14 | End: 2020-03-16 | Stop reason: HOSPADM

## 2020-03-14 RX ORDER — ACETAMINOPHEN 650 MG/1
650 SUPPOSITORY RECTAL EVERY 4 HOURS PRN
Status: DISCONTINUED | OUTPATIENT
Start: 2020-03-14 | End: 2020-03-16 | Stop reason: HOSPADM

## 2020-03-14 RX ORDER — AMOXICILLIN 250 MG
1 CAPSULE ORAL 2 TIMES DAILY PRN
Status: DISCONTINUED | OUTPATIENT
Start: 2020-03-14 | End: 2020-03-16 | Stop reason: HOSPADM

## 2020-03-14 RX ORDER — ROSUVASTATIN CALCIUM 5 MG/1
5 TABLET, COATED ORAL AT BEDTIME
Status: DISCONTINUED | OUTPATIENT
Start: 2020-03-14 | End: 2020-03-16 | Stop reason: HOSPADM

## 2020-03-14 RX ORDER — DULOXETIN HYDROCHLORIDE 60 MG/1
120 CAPSULE, DELAYED RELEASE ORAL EVERY MORNING
Status: DISCONTINUED | OUTPATIENT
Start: 2020-03-14 | End: 2020-03-16 | Stop reason: HOSPADM

## 2020-03-14 RX ORDER — METHADONE HYDROCHLORIDE 10 MG/1
10 TABLET ORAL DAILY
Status: DISCONTINUED | OUTPATIENT
Start: 2020-03-14 | End: 2020-03-16 | Stop reason: HOSPADM

## 2020-03-14 RX ORDER — LEVOTHYROXINE SODIUM 88 UG/1
88 TABLET ORAL DAILY
Status: DISCONTINUED | OUTPATIENT
Start: 2020-03-14 | End: 2020-03-16 | Stop reason: HOSPADM

## 2020-03-14 RX ORDER — NALOXONE HYDROCHLORIDE 0.4 MG/ML
.1-.4 INJECTION, SOLUTION INTRAMUSCULAR; INTRAVENOUS; SUBCUTANEOUS
Status: DISCONTINUED | OUTPATIENT
Start: 2020-03-14 | End: 2020-03-16 | Stop reason: HOSPADM

## 2020-03-14 RX ORDER — DEXTROSE MONOHYDRATE 25 G/50ML
25-50 INJECTION, SOLUTION INTRAVENOUS
Status: DISCONTINUED | OUTPATIENT
Start: 2020-03-14 | End: 2020-03-16 | Stop reason: HOSPADM

## 2020-03-14 RX ORDER — FUROSEMIDE 40 MG
40 TABLET ORAL DAILY
Status: DISCONTINUED | OUTPATIENT
Start: 2020-03-15 | End: 2020-03-16 | Stop reason: HOSPADM

## 2020-03-14 RX ORDER — POLYETHYLENE GLYCOL 3350 17 G/17G
17 POWDER, FOR SOLUTION ORAL DAILY PRN
Status: DISCONTINUED | OUTPATIENT
Start: 2020-03-14 | End: 2020-03-16 | Stop reason: HOSPADM

## 2020-03-14 RX ORDER — PANTOPRAZOLE SODIUM 40 MG/1
40 TABLET, DELAYED RELEASE ORAL
Status: DISCONTINUED | OUTPATIENT
Start: 2020-03-15 | End: 2020-03-16 | Stop reason: HOSPADM

## 2020-03-14 RX ORDER — NICOTINE POLACRILEX 4 MG
15-30 LOZENGE BUCCAL
Status: DISCONTINUED | OUTPATIENT
Start: 2020-03-14 | End: 2020-03-16 | Stop reason: HOSPADM

## 2020-03-14 RX ORDER — OXYCODONE HYDROCHLORIDE 5 MG/1
5-10 TABLET ORAL
Status: DISCONTINUED | OUTPATIENT
Start: 2020-03-14 | End: 2020-03-16 | Stop reason: HOSPADM

## 2020-03-14 RX ORDER — ONDANSETRON 2 MG/ML
4 INJECTION INTRAMUSCULAR; INTRAVENOUS EVERY 6 HOURS PRN
Status: DISCONTINUED | OUTPATIENT
Start: 2020-03-14 | End: 2020-03-16 | Stop reason: HOSPADM

## 2020-03-14 RX ORDER — OXYBUTYNIN CHLORIDE 5 MG/1
5 TABLET ORAL 2 TIMES DAILY
Status: DISCONTINUED | OUTPATIENT
Start: 2020-03-14 | End: 2020-03-16 | Stop reason: HOSPADM

## 2020-03-14 RX ORDER — AMOXICILLIN 250 MG
2 CAPSULE ORAL 2 TIMES DAILY PRN
Status: DISCONTINUED | OUTPATIENT
Start: 2020-03-14 | End: 2020-03-16 | Stop reason: HOSPADM

## 2020-03-14 RX ADMIN — PANTOPRAZOLE SODIUM 40 MG: 40 INJECTION, POWDER, FOR SOLUTION INTRAVENOUS at 08:25

## 2020-03-14 RX ADMIN — OXYCODONE HYDROCHLORIDE 10 MG: 5 TABLET ORAL at 02:00

## 2020-03-14 RX ADMIN — OXYCODONE HYDROCHLORIDE 10 MG: 5 TABLET ORAL at 05:48

## 2020-03-14 RX ADMIN — PIPERACILLIN SODIUM AND TAZOBACTAM SODIUM 3.38 G: 3; .375 INJECTION, POWDER, LYOPHILIZED, FOR SOLUTION INTRAVENOUS at 23:42

## 2020-03-14 RX ADMIN — SENNOSIDES AND DOCUSATE SODIUM 2 TABLET: 8.6; 5 TABLET ORAL at 18:48

## 2020-03-14 RX ADMIN — AMLODIPINE BESYLATE 5 MG: 5 TABLET ORAL at 12:12

## 2020-03-14 RX ADMIN — OXYBUTYNIN CHLORIDE 5 MG: 5 TABLET ORAL at 22:22

## 2020-03-14 RX ADMIN — TRAZODONE HYDROCHLORIDE 100 MG: 100 TABLET ORAL at 22:22

## 2020-03-14 RX ADMIN — PIPERACILLIN SODIUM AND TAZOBACTAM SODIUM 3.38 G: 3; .375 INJECTION, POWDER, LYOPHILIZED, FOR SOLUTION INTRAVENOUS at 18:32

## 2020-03-14 RX ADMIN — GABAPENTIN 1600 MG: 800 TABLET, FILM COATED ORAL at 16:21

## 2020-03-14 RX ADMIN — METHADONE HYDROCHLORIDE 20 MG: 10 TABLET ORAL at 22:22

## 2020-03-14 RX ADMIN — OXYCODONE HYDROCHLORIDE 10 MG: 5 TABLET ORAL at 18:48

## 2020-03-14 RX ADMIN — OXYBUTYNIN CHLORIDE 5 MG: 5 TABLET ORAL at 14:24

## 2020-03-14 RX ADMIN — METHADONE HYDROCHLORIDE 10 MG: 10 TABLET ORAL at 14:24

## 2020-03-14 RX ADMIN — OXYCODONE HYDROCHLORIDE 10 MG: 5 TABLET ORAL at 23:38

## 2020-03-14 RX ADMIN — VANCOMYCIN HYDROCHLORIDE 2000 MG: 5 INJECTION, POWDER, LYOPHILIZED, FOR SOLUTION INTRAVENOUS at 00:39

## 2020-03-14 RX ADMIN — LEVOTHYROXINE SODIUM 88 MCG: 88 TABLET ORAL at 12:12

## 2020-03-14 RX ADMIN — OXYCODONE HYDROCHLORIDE 10 MG: 5 TABLET ORAL at 13:28

## 2020-03-14 RX ADMIN — HUMAN ALBUMIN MICROSPHERES AND PERFLUTREN 9 ML: 10; .22 INJECTION, SOLUTION INTRAVENOUS at 10:13

## 2020-03-14 RX ADMIN — PIPERACILLIN SODIUM AND TAZOBACTAM SODIUM 3.38 G: 3; .375 INJECTION, POWDER, LYOPHILIZED, FOR SOLUTION INTRAVENOUS at 05:46

## 2020-03-14 RX ADMIN — ROSUVASTATIN CALCIUM 5 MG: 5 TABLET, FILM COATED ORAL at 22:22

## 2020-03-14 RX ADMIN — PIPERACILLIN SODIUM AND TAZOBACTAM SODIUM 3.38 G: 3; .375 INJECTION, POWDER, LYOPHILIZED, FOR SOLUTION INTRAVENOUS at 11:07

## 2020-03-14 RX ADMIN — OXYCODONE HYDROCHLORIDE 10 MG: 5 TABLET ORAL at 09:03

## 2020-03-14 RX ADMIN — GABAPENTIN 1600 MG: 800 TABLET, FILM COATED ORAL at 14:24

## 2020-03-14 RX ADMIN — GABAPENTIN 1600 MG: 800 TABLET, FILM COATED ORAL at 22:22

## 2020-03-14 RX ADMIN — DULOXETINE HYDROCHLORIDE 120 MG: 60 CAPSULE, DELAYED RELEASE ORAL at 12:12

## 2020-03-14 ASSESSMENT — ACTIVITIES OF DAILY LIVING (ADL)
ADLS_ACUITY_SCORE: 23

## 2020-03-14 NOTE — ED NOTES
All dressings removed.  Noted green purulent drainage from G tube site, J tube site, 2 abd wounds.  Redressed all 4 sited, gown changed and blanket changed.  Pt was contaminating medical equipment and clean blanket by touching drainage and then blankets and medical equipment.  All equipment  Cleansed.  Gown and blanket changed.  Pt given wipes to cleanse hands

## 2020-03-14 NOTE — PHARMACY
March 14, 2020    The [place] Drug Monitoring Program website was accessed to double check the patient's home dose of methadone. Unfortunately, when I tried to pull up the patient the website was not working. Another pharmacist also tried and it did not work for them either. I called the [facility] where the patient was admitted from and spoke with Ye who is a caretaker for the patient. He confirmed that the patient is given 20mg of methadone in the morning and evening and 10mg in the afternoon for her chronic pain. This was also verified with the patient's medication list that was sent from the [facility].     Emilie Pierce PharmD

## 2020-03-14 NOTE — PHARMACY-ADMISSION MEDICATION HISTORY
Pharmacy Medication History  Admission medication history interview status for the 3/13/2020  admission is complete. See EPIC admission navigator for prior to admission medications     Medication history sources: from nursing home list ([facility])  Medication history source reliability: Good  Adherence assessment: Good    Significant changes made to the medication list:  Added oxybutynin, doxycycline, tylenol. Deleted insulin, K-phos, magnesium. Updated oxy dose      Additional medication history information:   Oxybutynin dose was not specified on list    Medication reconciliation completed by provider prior to medication history? No    Time spent in this activity: 20 minutes      Prior to Admission medications    Medication Sig Last Dose Taking? Auth Provider   acetaminophen (TYLENOL) 325 MG tablet Take 650 mg by mouth every 4 hours as needed for mild pain  Yes Unknown, Entered By History   amLODIPine (NORVASC) 5 MG tablet 1 tablet (5 mg) by Oral or Feeding Tube route daily  Yes Khurram Livingston MD   bisacodyl (DULCOLAX) 10 MG suppository Place 10 mg rectally 2 times daily as needed for constipation  Yes Unknown, Entered By History   collagenase 250 UNIT/GM EX external ointment Apply topically daily Apply to rt abdomen wound every day shift  Yes Unknown, Entered By History   docusate sodium (COLACE) 100 MG capsule Take 2 capsules (200 mg) by mouth 2 times daily  Yes Khurram Livingston MD   doxycycline hyclate (VIBRA-TABS) 100 MG tablet Take 100 mg by mouth 2 times daily X 10 days  Yes Unknown, Entered By History   DULoxetine (CYMBALTA) 60 MG capsule Take 120 mg by mouth every morning  Yes Unknown, Entered By History   furosemide (LASIX) 40 MG tablet Take 1 tablet (40 mg) by mouth daily  Yes Khurram Livingston MD   gabapentin (NEURONTIN) 800 MG tablet Take 1,600 mg by mouth 3 times daily 0800, 1200 and 1800  Yes Unknown, Entered By History   hypromellose-dextran (ARTIFICAL TEARS)  0.1-0.3 % ophthalmic solution Place 1 drop into both eyes 3 times daily as needed for dry eyes  Yes Khurram Livingston MD   ipratropium - albuterol 0.5 mg/2.5 mg/3 mL (DUONEB) 0.5-2.5 (3) MG/3ML neb solution Take 1 vial by nebulization every 6 hours as needed for shortness of breath / dyspnea or wheezing  Yes Unknown, Entered By History   levalbuterol (XOPENEX) 1.25 MG/3ML neb solution TAKE ONE VIAL BY NEBULIZATION EVERY 8 HOURS AS NEEDED FOR SHORTNESS OF BREATH/DYSPNEA OR WHEEZING  Yes Mike Weiner MD   levothyroxine (SYNTHROID/LEVOTHROID) 88 MCG tablet TAKE 1 TABLET(88 MCG) BY MOUTH DAILY  Yes Shawnee Dueñas,    metFORMIN (GLUCOPHAGE) 500 MG tablet Take 500 mg by mouth 2 times daily (with meals)  Yes Reported, Patient   methadone (DOLOPHINE) 10 MG tablet Take 2 tablets (20 mg) by mouth 2 times daily Per Rx bottle take 2 tablets (20mg) in morning, 1 tablet (10mg) in afternoon, 2 tablets (20mg) in evening.  Patient taking differently: Take 10-20 mg by mouth 3 times daily Per Rx bottle take 2 tablets (20mg) in morning, 1 tablet (10mg) in afternoon, 2 tablets (20mg) in evening.   Yes Grisel Chicas MD   miconazole (MICATIN/MICRO GUARD) 2 % external powder Apply topically 2 times daily as needed for other (topical candidiasis)  Yes Khurram Livingston MD   ondansetron (ZOFRAN-ODT) 4 MG ODT tab Take 1 tablet (4 mg) by mouth every 6 hours as needed for nausea or vomiting  Yes Grisel Chicas MD   OXYBUTYNIN CHLORIDE PO Take by mouth 2 times daily  Yes Unknown, Entered By History   oxyCODONE HCl (OXAYDO) 5 MG TABA Take 10 mg by mouth daily Prior to physical therapy  Yes Unknown, Entered By History   pantoprazole sodium (PROTONIX) 40 MG packet Take 1 packet by mouth 2 times daily  Yes Unknown, Entered By History   polyethylene glycol (MIRALAX/GLYCOLAX) packet Take 17 g by mouth 2 times daily as needed (constipation)  Yes Khurram Livingston MD   potassium chloride ER (K-DUR/KLOR-CON M)  20 MEQ CR tablet Take 1 tablet (20 mEq) by mouth daily  Yes Khurram Livingston MD   rosuvastatin 5 MG PO tablet Take 5 mg by mouth At Bedtime  Yes Unknown, Entered By History   senna-docusate (SENOKOT-S;PERICOLACE) 8.6-50 MG per tablet Take 2 tablets by mouth 2 times daily  Patient taking differently: Take 2 tablets by mouth daily   Yes Rosa Summers APRN CNP   traZODone (DESYREL) 100 MG tablet Take 100 mg by mouth At Bedtime  Yes Unknown, Entered By History   VENTOLIN  (90 Base) MCG/ACT inhaler INHALE 2 PUFFS INTO THE LUNGS EVERY 6 HOURS AS NEEDED FOR SHORTNESS OF BREATH  Yes Skylar Moroe MD   blood glucose (NO BRAND SPECIFIED) lancets standard Use to test blood sugar 1 times daily or as directed.   Shawnee Dueñas DO   blood glucose monitoring (NO BRAND SPECIFIED) meter device kit Dispense freestyle zak glucose meter   Shawnee Dueñas DO   blood glucose monitoring (NO BRAND SPECIFIED) test strip Use to test blood sugars 1 times daily or as directed   Shawnee Dueñas DO   order for DME Equipment being ordered: Nebulizer   Shawnee Dueñas DO   order for DME Equipment being ordered: BIPAP.   15/5, Rate of 12, 2L O2 to keep sats 90-95%.   Chloe Kenny APRN CNP   order for DME Equipment being ordered: Nebulizer   Skylar Moore MD   order for DME Equipment being ordered: Hospital Bed, total electric (head, foot, and height adjustments), with any type side rails, with mattress   Shawnee Dueñas DO   order for DME Equipment being ordered: Motorized Wheelchair   Shawnee Dueñas DO   order for DME Equipment being ordered: Trapeze bar for hospital bed   Shawnee Dueñas DO   oxyCODONE 5 MG PO tablet Take 5 mg by mouth every 4 hours as needed for severe pain   Unknown, Entered By History

## 2020-03-14 NOTE — PROGRESS NOTES
[facility]    Medicine Progress Note - Hospitalist Service       Date of Admission:  3/13/2020  Date of Service: 03/14/2020    Assessment & Plan      Bhavani White is a 64 year old woman with past history that is most notable for severe morbid obesity (BMI near 60), PUD, chronic pain on Methadone, ANIYA, neurogenic bladder due to spinal stenosis, hypothyroid, Type 2 Diabetes mellitus, asthma, hypertension and dyslipidemia who underwent emergent repair of perforated duodenal ulcer 12/29/2019 with subsequent prolonged hospitalizations for intra-abdominal infections; who now presents for purulent drainage from her abdominal wounds and is found to have suspected sepsis due to abdominal cellulitis and possible intra-abdominal infections.     PLAN     1) Suspected      abdominal cellulitis        possible intra-abdominal infections:  Assessment: presents with new onset purulent drainage from several wound sites and burning epigastric discomfort. She has fever, tachycardia, leukocytosis and thrombocytosis. CT shows new or ongoing intra-abdominal fluid collections; I suspect she has staphyloccocal cellulitis and possibly also polymicrobial  intra-abdominal abscesses.  -- Surgery following.   -- NGT removed today, diet being advanced  --and Zosyn continued empirically as we follow up cultures.    -- Tylenol, Oxycodone, IV Dilaudid as needed for pain. Anti-emetics as needed.    -- For her PUD she takes Protonix PO BID; continued     2) Hypoxia: It seems by CT scans that she has new or worsening bilateral pleural effusions. These could be due to acute systolic or diastolic CHF exacerbation: noting that an initial TTE from 12/29 had shown reduced LVEF in the setting of critical illness, improved on repeat TTE 1/14/2020 to normalized LVEF. She has been taking lasix daily at home. Currently her hypoxia is mild and she seems asymptomatic from it.  Plan:  -- Oxygenation as needed for now with aggressive IS; once she  has been medically stabilized, would consider diagnostic and therapeutic thoracenteses.  -- Repeat TTE ordered.   -- Resume 40 mg PO Lasix      3) Anemia: Chronic  Assessment: Hgb stable, possibly due to ongoing illness. Monitor closely while hospitalized     4) Hypochloremic hyponatremia: Possibly hypovolemic; normalized with IVF     5) ANIYA: It seems she may be intermittently adherent to CPAP.     6) Chronic pain and neurogenic bladder: It seems she has severe spinal stenosis and takes Methadone as an outpatient. She was reportedly wheelchair bound prior to 12/2019, and has had a supra-pubic catheter in place for chronic retention.  Plan:  - resume PTA regimen of Cymbalta, trazodone and Neurontin as clinically able when verified     7) Type 2 Diabetes: Most recent A1c 7.1 in 1/2020.  -- Hold Metformin;   --ISS insulin ordered while hospitalized     8) Asthma: Resume home inhalers when verified     9) Hypertension, dyslipidemia: Resume Norvasc, statin.     10) Hypothyroid: Resume Synthroid when verified      Diet: Regular Diet Adult  Snacks/Supplements Adult: Other; chocolate magic cup and 4oz PLUS2 with meals (RD); With Meals    DVT Prophylaxis: Pneumatic Compression Devices  Lundberg Catheter: in place, indication: Neurogenic Bladder  Code Status: Full Code      Disposition Plan   Expected discharge: 2 - 3 days, recommended to prior living arrangement once adequate pain management/ tolerating PO medications.  Entered: Constantin Duran MD 03/14/2020, 11:04 AM       The patient's care was discussed with the Patient.    Constantin Duran MD  Hospitalist Service  [facility]    ______________________________________________________________________    Interval History     Feels improved  Pain stable  No CP/SOB, still on O2  No nausea/vomiting  No chills or fevers.     Data reviewed today: I reviewed all medications, new labs and imaging results over the last 24 hours. I personally reviewed no images or EKG's  today.    Physical Exam   Vital Signs: Temp: 98.9  F (37.2  C) Temp src: Oral BP: 91/55 Pulse: 75   Resp: 16 SpO2: 97 % O2 Device: Nasal cannula Oxygen Delivery: 2 LPM  Weight: 238 lbs 1.55 oz    Constitutional: no apparent distress  Respiratory: CTABL  Cardiovascular: regular S1 S2   GI: abdomen soft, tender around her tube sites and post-surgical wounds, which have surrounding tender erythema; all of them have evident small purulent drainage; bowel sounds are heard  Musculoskeletal: mild to moderate bilateral LE edema  Neurologic: extra-ocular muscles intact; moves all four extremities  Psychiatric: appropriate affect, speech non-tangential    Data   Recent Labs   Lab 03/14/20  0916 03/13/20 2122   WBC 14.6* 20.5*   HGB 9.6* 9.6*   MCV 76* 74*    458*    127*   POTASSIUM 3.4 3.4   CHLORIDE 97 89*   CO2 32 30   BUN 9 10   CR 0.65 0.61   ANIONGAP 5 8   DORY 9.8 9.6   * 164*   ALBUMIN 2.0* 2.3*   PROTTOTAL 6.6* 7.1   BILITOTAL 0.4 0.5   ALKPHOS 121 133   ALT 24 24   AST 34 33   LIPASE  --  17*     Recent Results (from the past 24 hour(s))   CT Chest (PE) Abdomen Pelvis w Contrast    Narrative    EXAM: CT CHEST PE ABDOMEN PELVIS W CONTRAST  LOCATION: [facility]  DATE/TIME: 3/13/2020 10:26 PM    INDICATION: Abdominal pain, new pus from feeding tube, fevers, hypoxia.  COMPARISON: None.  TECHNIQUE: CT angiogram chest and routine CT abdomen pelvis with IV contrast. Arterial phase through the chest and venous phase through the abdomen and pelvis. 2D and 3D MIP reconstructions were preformed by the CT technologist. Dose reduction techniques   were used.   CONTRAST: 83mL Isovue-370    FINDINGS:  ANGIOGRAM CHEST: Pulmonary arteries are normal caliber and negative for pulmonary emboli. Thoracic aorta is negative for dissection. No CT evidence of right heart strain.     LUNGS AND PLEURA: Moderate size right pleural effusion and smaller left pleural effusion with atelectasis involving  significant portions of both lower lobes.    MEDIASTINUM/AXILLAE: No enlarged mediastinal or hilar nodes. Coronary artery calcifications. Left ventricular wall calcifications as seen previously, likely related to old infarct.    HEPATOBILIARY: Small amount of ascites adjacent to the liver and within the sundar hepatis. Since the previous examination a percutaneous drain has been removed from the right upper quadrant. There remains a small complex gas and fluid collection within   the mid right abdomen anterior to the liver and just beneath the hepatic flexure measuring 7.5 x 5.2 cm on image 80 of series 11. Additional fluid collection located more superiorly and anteriorly on image 74 measures 7.3 x 2.7 cm is also considerably   smaller, adjacent to the indwelling GJ tube. Additional scattered tiny collections of free intraperitoneal air beneath the anterior abdominal wall.     PANCREAS: Normal.    SPLEEN: Large loculated fluid collection seen previously within the left upper quadrant adjacent to the spleen is markedly smaller.    ADRENAL GLANDS: Normal.    KIDNEYS/BLADDER: Small left renal cyst. Suprapubic catheter in the bladder.    BOWEL: Percutaneous GJ tube with the retention balloon in the stomach and the tip of the NJ tube located in the proximal jejunum. No evidence for bowel obstruction.    LYMPH NODES: Normal.    PELVIC ORGANS: Hysterectomy.    MUSCULOSKELETAL: Diffuse muscular atrophy. Edematous changes subcutaneous tissues. Osteopenia. Severe degenerative changes throughout the thoracolumbar spine. Scoliosis.      Impression    IMPRESSION:  1.  Right upper quadrant percutaneous drain has been removed. Irregular complex gas and fluid collections within the right upper quadrant are all smaller, but may represent residual abscesses. The anteriorly located collection is adjacent to the   indwelling GJ tube and may account for the purulent drainage. The left upper quadrant collection adjacent to the spleen is  also considerably smaller.  2.  Small amount of ascites adjacent to the liver.  3.  Bilateral pleural effusions and bibasilar atelectasis.     Medications       amLODIPine  5 mg Oral or Feeding Tube Daily     DULoxetine  120 mg Oral QAM     [START ON 3/15/2020] furosemide  40 mg Oral Daily     gabapentin  1,600 mg Oral TID     insulin aspart  1-6 Units Subcutaneous Q4H     levothyroxine  88 mcg Oral Daily     oxybutynin  5 mg Oral BID     pantoprazole (PROTONIX) IV  40 mg Intravenous BID     pantoprazole  40 mg Oral QAM AC     piperacillin-tazobactam  3.375 g Intravenous Q6H     rosuvastatin  5 mg Oral At Bedtime     sodium chloride (PF)  3 mL Intracatheter Q8H     traZODone  100 mg Oral At Bedtime

## 2020-03-14 NOTE — CONSULTS
CLINICAL NUTRITION SERVICES  -  ASSESSMENT NOTE      Recommendations Ordered by Registered Dietitian (RD):     Will send a magic cup (290 cals, 9 gm pro each) and a 4oz PLUS2 drink (240 cals, 9 gm pro each) with meals     Future/Additional Recommendations:     Reviewed diet order, meal ordering, supplements and importance of protein for healing.     Malnutrition:   % Weight Loss:  Up to 10% in 6 months (non-severe malnutrition) - 8% wt loss  % Intake:  No decreased intake noted  Subcutaneous Fat Loss:  None observed  Muscle Loss:  Temporal region - mild depletion  Fluid Retention:  LE edema (not new)    Malnutrition Diagnosis: Non-Severe malnutrition  In Context of:  Chronic illness or disease         REASON FOR ASSESSMENT  Bhavani White is a 64 year old female seen by Registered Dietitian for Admission Nutrition Risk Screen for unintentional loss of 10# or more in the past two months and stageable pressure injuries or large/non-healing wound or burn      NUTRITION HISTORY  Chart reviewed, visited with pt this morning  Pt is known to me from previous admits  Had been on TF via J-tube (providing 100% est needs)  Pt notes that she hasn't had TF for about the past month  States she is eating 3 meals per day at [facility]  Likes to drink milk with her meals  Breakfast is typically hot cereal  Has meat, vegg, fruit at other meals  Notes that she has been receiving chocolate magic cup supplements between her meals      CURRENT NUTRITION ORDERS  Diet Order:     Regular    Pt had G and J tubes removed this morning  Getting ready to order some breakfast  Is agreeable to a magic cup and 4oz milk supplement with meals       NUTRITION FOCUSED PHYSICAL ASSESSMENT FOR DIAGNOSING MALNUTRITION)  Yes              Observed:    Muscle wasting (refer to documentation in Malnutrition section)    Obtained from Chart/Interdisciplinary Team:  4 abdominal wounds with large amounts of purulent drainage   mild to moderate bilateral LE edema  "    ANTHROPOMETRICS  Height: 5'5\"  Weight:(3/14) 108 kg / 238 lbs 1.55 oz  Body mass index is 39.62 kg/m .  Weight Status:  Obesity Grade II BMI 35-39.9  IBW: 56.8 kg  % IBW: 190%  Weight History: Pt confirms that wt is currently in the 230s - states she has always fluctuated with fluid.  Notes that she has never been \"skinny with wasting\"  Records reflect wt is down ~20# from 6 months ago (down 60# from 1 month ago - do not feel this is true wt loss).  (Ultimately, wt loss is desired in pt with BMI 39)  Wt Readings from Last 10 Encounters:   03/14/20 108 kg (238 lb 1.6 oz)   02/18/20 135.6 kg (299 lb)   02/11/20 136 kg (299 lb 13.2 oz)   01/21/20 93 kg (205 lb 0.4 oz)   11/01/19 113.4 kg (250 lb)   10/28/19 121.7 kg (268 lb 3.2 oz)   08/29/19 117.5 kg (259 lb)   03/15/19 117.5 kg (259 lb)   03/11/19 117.7 kg (259 lb 8 oz)   12/13/18 115.2 kg (254 lb)         LABS  Labs reviewed    MEDICATIONS  Taking lasix PTA      ASSESSED NUTRITION NEEDS PER APPROVED PRACTICE GUIDELINES:    Dosing Weight: 108 gm (actual wt for energy); 56.8 kg (IBW for protein)  Estimated Energy Needs: 3949-3275 kcals (14-17 Kcal/Kg)  Justification: obese  Estimated Protein Needs:  grams protein (1.5-2 g pro/Kg)  Justification: wound healing  Estimated Fluid Needs: 1924-8872  mL (1 mL/Kcal)  Justification: maintenance    MALNUTRITION:  % Weight Loss:  Up to 10% in 6 months (non-severe malnutrition) - 8% wt loss  % Intake:  No decreased intake noted  Subcutaneous Fat Loss:  None observed  Muscle Loss:  Temporal region - mild depletion  Fluid Retention:  LE edema (not new)    Malnutrition Diagnosis: Non-Severe malnutrition  In Context of:  Chronic illness or disease    NUTRITION DIAGNOSIS:  Increased nutrient needs (protein) related to higher demand for healing as evidenced by pt with abd wounds      NUTRITION INTERVENTIONS  Recommendations / Nutrition Prescription  Regular diet   Nutrition supplements  .      Implementation  Nutrition " education ---> Reviewed diet order, meal ordering, supplements and importance of protein for healing.  Will send a magic cup (290 cals, 9 gm pro each) and a 4oz PLUS2 drink (240 cals, 9 gm pro each) with meals  .      Nutrition Goals  Pt to consume 100% supplements being sent (1590 cals, 54 gm pro)  .      MONITORING AND EVALUATION:  Progress towards goals will be monitored and evaluated per protocol and Practice Guidelines

## 2020-03-14 NOTE — PLAN OF CARE
Cognitive Concerns/ Orientation : Alert and oriented x 4   BEHAVIOR & AGGRESSION TOOL COLOR: Green   CIWA SCORE: N/A   ABNL VS/O2: VSS 92% 2 lpm NC desats in upper 80's on room air   MOBILITY: Lift, wheelchair at baseline   PAIN MANAGMENT: Complains of generalized pain, oxycodone PRN   DIET: NPO with ice chips   BOWEL/BLADDER: Lundberg catheter in place, no Bm this shift   ABNL LAB/BG: Lactic 1.5 Blood glucose 118 and 129  DRAIN/DEVICES: Peripheral IV SL right AC   TELEMETRY RHYTHM: N/A   SKIN: Pale, 4 abdominal wounds with large amounts of purulent drainage, dressings changed at 2am.    TESTS/PROCEDURES: Surgery consult ordered   D/C DAY/GOALS/PLACE: Pending   OTHER IMPORTANT INFO: Contact isolation for MRSA.  Continues on IV Zosyn and Vancomycin.  ED thought they saw patient twisting GT while in the ED, if we see patient manipulating abdominal dressings/GT we are to chart findings, patient not noted to be doing any of the above tonight.      Admission    Patient arrives to room 609 via cart from ED  Care plan note: Completed     Inpatient nursing criteria listed below were met:    PCD's Documented: Yes  Skin issues/needs documented :Yes  Isolation education started/completed Yes  Patient allergies verified with patient: Yes  Verified completion of [place] Risk Assessment Tool:  Yes  Verified completion of Guardianship screening tool: Yes  Fall Prevention: Care plan updated, Education given and documented Yes  Care Plan initiated: Yes  Home medications documented in belongings flowsheet: NA  Patient belongings documented in belongings flowsheet: Yes  Reminder note (belongings/ medications) placed in discharge instructions: NA  Admission profile/ required documentation complete: Yes  Bedside Report Letter given and explained to patient Yes

## 2020-03-14 NOTE — ED NOTES
Bed: ED05  Expected date:   Expected time:   Means of arrival:   Comments:  533 64F possible infection G tube ETA 2049

## 2020-03-14 NOTE — ED PROVIDER NOTES
History     Chief Complaint:  Pus from abdominal wounds    HPI   Bhavani White is a 64 year old female with a history of diabetes, fibromyalgia, and MRSA infection who presents to the emergency department by EMS for evaluation of pus coming from her abdominal wounds.   At the very end of 2019, she had a perforated duodenal ulcer that required surgery, which was subsequently complicated by intra-abdominal abscess requiring a drain placed by IR on January 27.  She has been on antibiotics and had multiple hospitalizations during this time.  She has a gastrostomy tube as well as a jejunostomy tube in place.  She states she saw her surgeon, Dr. Lopez, in clinic 3 days ago at which time 1 of her abdominal drains was pulled.  She states that her G and J tubes are not being used for nutrition, and she is tolerating full diet by mouth.  She had a fever of 102 before she arrived here today, that was reportedly not given any antipyretics at her care facility.  She denies feeling short of breath or having any cough, and is not normally on oxygen.  She is not currently on antibiotics.  She states she has had pus from her wounds since just after being seen in clinic.        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  Morbid obesity  MRSA  Osteoarthritis  Sleep apnea  Leukocytosis  NSTEMI  Septic shock  Perforated duodenal ulcer  Neuropathy  Fibromyalgia    Past Surgical History:    Abdominal surgery  Apply wound  vac  Laminectomy  Total knee arthroplasty  Close secondary wound abdomen  Cystoscopy x5  Discectomy  Gastrotomy  Genitourinary surgery  Hernia repair  Hysterectomy  Inject Botox x2  Jejunostomy tube change x2  Diagnostic laparotomy   Exploratory laparotomy x2  Cataract surgery bilateral  Carpal tunnel release surgery  Tonsillectomy     Family History:    Mother: hypertension, heart disease, depression  Father: hypertension, ankylosing spondylitis, prostate cancer, bladder cancer  Sister: depression    Social History:  The patient was accompanied to the ED by EMS.  Smoking Status: Never  Smokeless Tobacco: Never  Alcohol Use: No  Drug Use: Yes: marijuana  Marital Status:       Review of Systems   All other systems reviewed and are negative.    Physical Exam   Vitals:  Patient Vitals for the past 24 hrs:   BP Pulse SpO2   03/14/20 0030 110/64 89 96 %   03/14/20 0015 113/62 90 96 %   03/14/20 0000 118/68 92 96 %   03/13/20 2345 116/72 92 96 %   03/13/20 2330 116/70 93 95 %   03/13/20 2315 123/70 96 95 %   03/13/20 2300 126/70 93 93 %   03/13/20 2230 -- -- 95 %   03/13/20 2215 129/76 98 93 %   03/13/20 2200 (!) 125/97 -- --   03/13/20 2130 -- -- 94 %   03/13/20 2115 -- -- 95 %   03/13/20 2100 132/78 -- 94 %   03/13/20 2054 132/78 104 (!) 87 %     Physical Exam  General: chronically ill-appearing woman sitting upright in room 5  HENT: mucous membranes slightly dry  CV: rate as above  Resp: normal effort, slightly coarse at both bases, no wheezing  GI: Abdomen soft, protuberant, G and J tubes in place.  MSK: no bony tenderness, no CVAT  Skin: Tricia purulence can be expressed from her epigastric gastrostomy tube, as well as from her right upper quadrant wound.  No visible bleeding.  She has an additional healing wound on the right side of her abdomen.  Her jejunostomy tube has significant bright red irritation to the surrounding skin though no tricia pus.  Neuro: alert, clear speech, oriented   Psych:  cooperative    Emergency Department Course     Imaging:  Radiographic findings were communicated with the patient who voiced understanding of the findings.    CT Chest (PE) Abdomen Pelvis W Contrast  1.  Right upper quadrant percutaneous drain has been removed. Irregular complex gas and fluid collections within the right upper quadrant are all smaller, but may represent residual abscesses. The anteriorly located collection is adjacent to the   indwelling GJ tube and may account for the purulent drainage. The left upper quadrant collection adjacent to the spleen is also considerably smaller.  2.  Small amount of ascites adjacent to the liver.  3.  Bilateral pleural effusions and bibasilar atelectasis.  Reading per radiology.    Laboratory:  CBC: WBC 20.5 (H), HGB 9.6 (L),  (H) o/w WNL.   CMP: Sodium 127 (L), Chloride 89 (L), Glucose 164 (H), Albumin 2.3 (L) o/w AWNL (Creatinine 0.61)    Lipase: 17 (L)  Lactic Acid: 1.5    Creatinine POCT: 0.6, GFR: >90  UA with micro: Protein Albumin 30, Leukocyte Esterase large, WBC 15 (H), RBC 6 (H), Bacteria few, Mucous present o/w negative    Wound Culture Aerobic Bacterial x2: Pending  Blood Culture x2: Pending  Urine Culture: Pending    Interventions:  2203 Dilaudid 1 mg IV  2329 Zosyn 3.375 g IV  2331 NS, 1 L, IV bolus   Vancomycin 2000 mg IV    Emergency Department Course:  Past medical records, nursing notes, and vitals reviewed.    2057 I performed an exam of the patient as documented above.     IV was inserted and blood was drawn for laboratory testing, results above.  The patient provided a urine sample here in the emergency department. This was sent for laboratory testing, findings above.  The patient was sent for a chest/abodmen/pelvis CT while in the emergency department, results above.     2125 I rechecked the patient and discussed the results of her workup thus far.     2334 I rechecked and updated the patient.     2348 I spoke with Dr. Pulido, hospitalist,  who agreed to admit the patient.     Findings and plan explained to the Patient who consents to admission. Discussed the patient with Dr. Pulido, who will admit the patient to a surgical inpatient bed for further monitoring, evaluation, and treatment.    I personally reviewed the laboratory results with the Patient and answered all related questions prior to admission.    Impression & Plan      Medical Decision Making:  She has clinically apparent infection with pus from previous established drain sites.  Given her history of intra-abdominal abscess, I felt that CT imaging was indicated, and included her chest as well given new hypoxia and risk for pulmonary embolism given further recent surgery and hospitalizations.  She is not in respiratory distress.  Broad-spectrum antibiotics were initiated.  Cultures were sent from the 2 wounds.  I do not think she requires immediate surgical consultation given these findings at this late hour, though she will clearly require surgical consultation and further multidisciplinary care here in the hospital for this complex issue.  The patient was notified of these findings and I have arranged for hospitalization through the hospitalist service.    Diagnosis:    ICD-10-CM    1. Intra-abdominal infection  B99.9 CBC with platelets differential     Comprehensive metabolic panel     Lipase     Blood culture     Blood culture     UA with Microscopic     Lactic acid whole blood     Wound Culture Aerobic Bacterial     Wound Culture Aerobic Bacterial     Urine Culture Aerobic Bacterial   2. Hyponatremia  E87.1    3. Anemia, unspecified type  D64.9    4. Abnormal urinalysis  R82.90    5. Acute respiratory failure with hypoxia (H)  J96.01        Disposition:  Admitted to a surgical inpatient bed under the care of Dr. Pulido.      I, Craig Abbasi, am serving as a scribe on 3/13/2020 at 9:17 PM to personally document services performed by Mike Logan, * based on my observations and  the provider's statements to me.     This record was created at least in part using electronic voice recognition software, so please excuse any typographical errors.    rCaig Abbasi  3/13/2020    EMERGENCY DEPARTMENT       Mike Logan MD  03/14/20 0105

## 2020-03-14 NOTE — CONSULTS
Surgery consultation    I was asked to see Bhavani in consultation by Dr. Pulido for wound infections.  Bhavani is well-known to me as she had a large perforated ulcer that required damage control surgery with multiple drain placement.  She was readmitted for abdominal abscesses.  I saw her in clinic on Tuesday and her right upper quadrant drain had been dislodged and appeared to have a superficial infection.  This was removed and a small amount of purulent drainage was expressed.  Her midline wound had near completely healed.  She was having drainage from her G tube and J-tube and wanted them removed.  We decided to wait 2 weeks to assure she did not have problems with her right upper quadrant drain removal.  Unfortunately, she was admitted with purulent drainage from her wounds as well as signs of sepsis including fevers and elevated white count.  A CT showed that there was smaller but some residual fluid collection next to the right upper quadrant drain removal site as well as her other tubes.  No other abnormalities were seen.  The patient complains of pain around both tube sites as they are constantly leaking and causing skin discomfort.  She has not used any of her tubes for at least 1 month.    Abdomen- right upper quadrant wound-probed with some purulent drainage.  No obvious skin erythema or palpable fluctuation.  Midline wound-healing well.  G-tube site with some purulent drainage and skin irritation.  J-tube site with significant skin irritation.    A/P  I have personally reviewed all imaging studies.  It appears that her previous fluid collections are actually smaller but still present.  These are focused around all of her drain sites that are currently not in use.  I removed her G and J-tube today which will hopefully help drain the small collections externally and let these areas heal.  These should be covered with a gauze and changed when saturated.  Her right upper quadrant wound has an obvious  infection.  I would recommend half-inch iodoform packing twice daily.  Continue antibiotics. No need for surgical or interventional radiology drainage at this time.  I appreciate the hospitalist help in managing this patient.    Ayan Lopez M.D.  [facility]  [phone]

## 2020-03-14 NOTE — ED NOTES
"[facility]  ED Nurse Handoff Report    ED Chief complaint: Fever and Wound Infection      ED Diagnosis:   Final diagnoses:   Intra-abdominal infection   Hyponatremia   Anemia, unspecified type   Abnormal urinalysis   Acute respiratory failure with hypoxia (H)       Code Status: Full Code    Allergies:   Allergies   Allergen Reactions     Povidone Iodine      \"mild skin irritation\" per patient report     Toradol [Ketorolac] Other (See Comments)     Pt gets nightmares     Tramadol Other (See Comments)       Patient Story: Pt at rehab post bowel surgery, Fever for a couple of days per pt.  NH reports fever today at 103.  Sats 88% on RA per EMS.  Green purulent drainage from 4 abd wounds (includes G and J tubes)  Focused Assessment:  Red excoriated areas under G and J tube.  Green drainage from 2 abd wounds and G and J tubes. Pain in abd and back.    Treatments and/or interventions provided: dilaudid. Zosyn, 1L NS, redressed wounds  Patient's response to treatments and/or interventions: resting comfortably after dialudid    To be done/followed up on inpatient unit:  Vanco to be hung if not done in ER    Does this patient have any cognitive concerns?: Forgetful    Activity level - Baseline/Home:  Wheelchair  Activity Level - Current:   Wheelchair    Patient's Preferred language: English   Needed?: No    Isolation:   Infection: Not Applicable  MRSA  Bariatric?: No    Vital Signs:   Vitals:    03/13/20 2054   BP: 132/78   Pulse: 104   SpO2: (!) 87%       Cardiac Rhythm:     Was the PSS-3 completed:   Yes  What interventions are required if any?               Family Comments: no family here  OBS brochure/video discussed/provided to patient/family: N/A              Name of person given brochure if not patient: none              Relationship to patient:     For the majority of the shift this patient's behavior was Green.   Behavioral interventions performed were none  .    ED NURSE PHONE NUMBER: " [phone]

## 2020-03-14 NOTE — PHARMACY-VANCOMYCIN DOSING SERVICE
Pharmacy Vancomycin Initial Note  Date of Service 2020  Patient's  1955  64 year old, female    Indication: Intra-abdominal infection and Skin and Soft Tissue Infection    Current estimated CrCl = Estimated Creatinine Clearance: 131.7 mL/min (based on SCr of 0.61 mg/dL).    Creatinine for last 3 days  3/13/2020:  9:22 PM Creatinine 0.61 mg/dL    Recent Vancomycin Level(s) for last 3 days  No results found for requested labs within last 72 hours.      Vancomycin IV Administrations (past 72 hours)                   vancomycin 2000 mg in 0.9% NaCl 500 ml intermittent infusion 2,000 mg (mg) 2,000 mg Given 20 0039                Nephrotoxins and other renal medications (From now, onward)    Start     Dose/Rate Route Frequency Ordered Stop    20 1300  vancomycin 2000 mg in 0.9% NaCl 500 ml intermittent infusion 2,000 mg      2,000 mg  over 90 Minutes Intravenous EVERY 12 HOURS 20 0110      20 0101  piperacillin-tazobactam (ZOSYN) 3.375 g vial to attach to  mL bag      3.375 g  over 30 Minutes Intravenous EVERY 6 HOURS 20 0101            Contrast Orders - past 72 hours (72h ago, onward)    Start     Dose/Rate Route Frequency Ordered Stop    20 2228  iopamidol (ISOVUE-370) solution 83 mL      83 mL Intravenous ONCE 207 20 2249                Plan:  1.  Start vancomycin  2000 mg IV q12h.   2.  Goal Trough Level: 15-20 mg/L   3.  Pharmacy will check trough levels as appropriate in 1-3 Days.    4. Serum creatinine levels will be ordered daily for the first week of therapy and at least twice weekly for subsequent weeks.    5. [place] method utilized to dose vancomycin therapy: Method 1    Lincoln Bailey [facility]

## 2020-03-14 NOTE — H&P
[facility]    History and Physical  Hospitalist       Date of Admission:  3/13/2020  Date of Service (when I saw the patient): 03/13/20    ASSESSMENT  Bhavani White is a 64 year old woman with past history that is most notable for severe morbid obesity (BMI near 60), PUD, chronic pain on Methadone, ANIYA, neurogenic bladder due to spinal stenosis, hypothyroid, Type 2 Diabetes mellitus, asthma, hypertension and dyslipidemia who underwent emergent repair of perforated duodenal ulcer 12/29/2019 with subsequent prolonged hospitalizations for intra-abdominal infections; who now presents for purulent drainage from her abdominal wounds and is found to have suspected sepsis due to abdominal cellulitis and possible intra-abdominal infections.    PLAN    1) Suspected sepsis due to abdominal cellulitis and possible intra-abdominal infections:              Of note, she was hospitalized here for duodenal ulcer perforation requiring emergent exploratory laparotomy 12/29/2019, complicated by hemorrhagic shock and acute hypoxic respiratory failure as well as Type 2 NSTEMI. She had lysis of adhesions during the surgery and a Malecot drain was placed over the perforation as well as a wound VAC, followed by further surgery 1/1/2020 for closure of perforation and G-tube/Jtube drain placements. She had a prolonged hospital course and was eventually extubated, stabilized and discharged to a facility on 1/21/2020. Then she was re-admitted 1/26/2020 for purulent drainage from her surgical sites; CT on 1/26 showed loculated complex upper abdominal fluid collections. Surgery and ID were consulted. Additional drains were placed 1/27. C glabrata grew from wound cultures (as well as MRSA, S mitis and H parainfluenzae; urine cultures grew Pseudomonas) and she received a course of Micafungin as well as Vancomycin and Zosyn. Her J tube was replaced by IR on 2/4. CT was repeated 2/11 and this showed persistent obed-splenic fluid,  having completed a prolonged antibiotic course, and it was recommended to observe with repeat CT one week later. 2 further drains were removed prior to her eventual discharge on 2/12/2020. She then re-presented 2/19/2020 to the ED for clogged J tube; this was successfully replaced by IR. Repeat CT was done and this showed the G tube to be in good position, with small ascites and small bilateral pleural effusions. She was discharged from the ED back to her facility. On 3/10 she saw her surgical team and significant interim weight loss as noted, but otherwise her wounds seemed to be healing well. Her right upper quadrant drain was removed and G and J tubes clamped.      Now she presents with new onset purulent drainage from several wound sites and burning epigastric discomfort. She has fever, tachycardia, leukocytosis and thrombocytosis. CT shows new or ongoing intra-abdominal fluid collections; I suspect she has staphyloccocal cellulitis and possibly also polymicrobial  intra-abdominal abscesses.    -- Inpatient. NPO. Surgery consulted. Vancomycin and Zosyn continued empirically as we follow up cultures.    -- Tylenol, Oxycodone, IV Dilaudid as needed for pain. Anti-emetics as needed.    -- For her PUD she takes Protonix PO BID; will order IV BID for now    2) Hypoxia: It seems by CT scans that she has new or worsening bilateral pleural effusions. These could be due to acute systolic or diastolic CHF exacerbation: noting that an initial TTE from 12/29 had shown reduced LVEF in the setting of critical illness, improved on repeat TTE 1/14/2020 to normalized LVEF. She has been taking lasix daily at home. Currently her hypoxia is mild and she seems asymptomatic from it.    -- Oxygenation as needed for now with aggressive IS; once she has been medically stabilized, would consider diagnostic and therapeutic thoracenteses.    -- Repeat TTE ordered. Hold 40 mg PO Lasix for now in the setting of acute infection; resume as  able in AM, possibly in IV form if hypoxia worsens overnight    3) Anemia: Chronic; possibly due to ongoing illness. Monitor closely while hospitalized    4) Hypochloremic hyponatremia: Possibly hypovolemic; she received IV fluid in the ED; repeat labs in AM    5) ANIYA: It seems she may be intermittently adherent to CPAP; trial ordered    6) Chronic pain and neurogenic bladder: It seems she has severe spinal stenosis and takes Methadone as an outpatient. She was reportedly wheelchair bound prior to 12/2019, and has had a supra-pubic catheter in place for chronic retention.    -- Carefully resume home medication narcotic regimen and Cymbalta, trazodone and Neurontin as clinically able when verified    7) Type 2 Diabetes: Most recent A1c 7.1 in 1/2020.    -- Hold Metformin; ISS insulin ordered while hospitalized    8) Asthma: Resume home inhalers when verified    9) Hypertension, dyslipidemia: Resume Norvasc, statin as able clinically    10) Hypothyroid: Resume Synthroid when verified    Chief Complaint   Abdominal pain    History is obtained from the patient and the ED physician whom I have spoken with    History of Present Illness   Bhavani White is a 64 year old woman who presents with diffuse burning pain throughout her abdomen that started about 3 days ago; it is worst in the regions around her tube sites and surgical wounds and I the past couple of days has become associated with purulent drainage from those sites. These are being managed at her facility with dressing changes. This evening a fever of 102 was documented at her facility and she was brought in for further evaluation. She says she has been tolerating PO intake by mouth and has asked for her G and J tubes to be removed, but was informed that due to ongoing weight loss it might be best to leave them in a little longer for now by her surgeons, and she says she understands the concern of removing them too soon. Nevertheless she says their presence is a  "great bother to her. Otherwise, she denies rigors or sweats, or nausea, cough, dyspnea, chest pain, or diarrhea, dysuria (she adds that her suprapubic catheter was replaced earlier today), or other acute complaints.    In the ED, Blood pressure 132/78, pulse 104, SpO2 (!) 87 %, not currently breastfeeding.    CBC showed WBC 21, HGB 9.6, . Lactate 1.5. CMP was done and notable for Na 127, Cl 89, Alb 2.3, otherwise was within normal reference range. UA showed 15 WBC, 6 RBC. G tube and wound cultures as well as blood and urine cultures were sent.    CT chest, abdomen and pelvis with PE protocol was done and this showed:  \"IMPRESSION:  1.  Right upper quadrant percutaneous drain has been removed. Irregular complex gas and fluid collections within the right upper quadrant are all smaller, but may represent residual abscesses. The anteriorly located collection is adjacent to the   indwelling GJ tube and may account for the purulent drainage. The left upper quadrant collection adjacent to the spleen is also considerably smaller.  2.  Small amount of ascites adjacent to the liver.  3.  Bilateral pleural effusions and bibasilar atelectasis.\"    She was given Vancomycin and Zosyn as well as Dilaudid and IV fluid in the ED.    PHYSICAL EXAM  Blood pressure 132/78, pulse 104, SpO2 (!) 87 %, not currently breastfeeding.  Constitutional: Alert and oriented to person, place and time; no apparent distress; severe morbid obesity  HEENT: normocephalic moist mucus membranes  Respiratory: lungs clear to auscultation bilaterally, sounds are very faint however  Cardiovascular: regular S1 S2   GI: abdomen soft, tender around her tube sites and post-surgical wounds, which have surrounding tender erythema; all of them have evident small purulent drainage; bowel sounds are heard  Lymph/Hematologic: pale, no cervical lymphadenopathy  Skin: no rash, good turgor  Musculoskeletal: mild to moderate bilateral LE edema  Neurologic: " extra-ocular muscles intact; moves all four extremities  Psychiatric: appropriate affect, speech non-tangential     DVT Prophylaxis: Pneumatic Compression Devices  Code Status: Full Code    Disposition: Expected discharge in 2-3 days    Jasvir Pulido MD    Past Medical History    I have reviewed this patient's medical history and updated it with pertinent information if needed.   Past Medical History:   Diagnosis Date     Anxiety      Asthma      Depression      DM (diabetes mellitus) (H)      Frequent UTI      History of blood transfusion      History of ulcer disease 2014    She notes from NSAIDs; nearly . Discussed a hospitalization at [place] for this.     Hypertension      Hypothyroid      Iatrogenic Cushing's disease (H)     off prednisone now     Infection due to alpha-hemolytic Streptococcus 2020     Morbid obesity (H)      MRSA (methicillin resistant staph aureus) culture positive 2012    repeat cx 10/12/2012 no staph mentioned.     Osteoarthritis     multiple joings.     Other chronic pain      Sleep apnea     No longer uses cpap.       Past Surgical History   I have reviewed this patient's surgical history and updated it with pertinent information if needed.  Past Surgical History:   Procedure Laterality Date     ABDOMEN SURGERY       APPLY WOUND VAC  2019    Procedure: Apply Wound Vac;  Surgeon: Ayan Lopez MD;  Location: [facility] OR     C LAMINECTOMY,FACETECTOMY,LUMBAR  1982     C TOTAL KNEE ARTHROPLASTY      left     CLOSE SECONDARY WOUND ABDOMEN N/A 2020    Procedure: SECONDARY ABDOMINAL WOUND CLOSURE;  Surgeon: Ayan Lopez MD;  Location:  OR     CYSTOSCOPY N/A 2018    Procedure: CYSTOSCOPY;  Cystoscopy and Botox Injection Into the Bladder and suprapubic tube exchange;  Surgeon: Yoandy Rios MD;  Location:  OR     CYSTOSCOPY N/A 3/15/2019    Procedure: Cystoscopy, suprapubic tube exchange;  Surgeon: Yoandy Rios MD;   Location: [facility] OR     CYSTOSCOPY FLEXIBLE, CYSTOSTOMY, INSERT TUBE SUPRAPUBIC, COMBINED N/A 11/1/2019    Procedure: Cystoscopy, Bladder Botox Injection, Suprapubic Tube Change;  Surgeon: Yoandy Rios MD;  Location: [facility] OR     CYSTOSCOPY, INTRAVESICAL INJECTION N/A 8/23/2017    Procedure: CYSTOSCOPY, INTRAVESICAL INJECTION;  Cystoscopy, Botox Injection Into The Bladder  Latex Allergy ;  Surgeon: Yoandy Rios MD;  Location: [facility] OR     CYSTOSCOPY, INTRAVESICAL INJECTION N/A 3/8/2018    Procedure: CYSTOSCOPY, INTRAVESICAL INJECTION;  Cystoscopy, Botox Injection Into The Bladder and suprapubic catheter exchange;  Surgeon: Yoandy Rios MD;  Location: [facility] OR     DISCECTOMY, FUSION CERVICAL ANTERIOR THREE+ LEVELS, COMBINED Left 5/3/2016    Procedure: COMBINED DISCECTOMY, FUSION CERVICAL ANTERIOR THREE+ LEVELS;  Surgeon: Jasvir Torres MD;  Location: [facility] OR     GASTROSTOMY, INSERT TUBE, COMBINED N/A 1/1/2020    Procedure: GASTRIC-JEJUNAL FEEDING TUBE INSERTION;  Surgeon: Ayan Lopez MD;  Location:  OR     GENITOURINARY SURGERY      suprapubic catheter     HERNIA REPAIR  1957    double hernia     HYSTERECTOMY, PAP NO LONGER INDICATED      CELIA and large ovarian tumor removed     INJECT BOTOX N/A 9/21/2018    Procedure: INJECT BOTOX;;  Surgeon: Yoandy Rios MD;  [facility]     INJECT BOTOX N/A 3/15/2019    Procedure: Inject Botox into Bladder;  Surgeon: Yoandy Rios MD;  [facility]: [facility]     IR JEJUNOSTOMY TUBE CHANGE  2/4/2020     IR JEJUNOSTOMY TUBE CHANGE  2/19/2020     LAPAROSCOPY DIAGNOSTIC (GENERAL) N/A 12/30/2019    Procedure: Laparoscopy diagnostic (general);  Surgeon: Ayan Lopez MD;  Location:  OR     LAPAROTOMY EXPLORATORY N/A 1/1/2020    Procedure: EXPLORATORY LAPAROTOMY;  Surgeon: Ayan Lopez MD;  Location:  OR     LAPAROTOMY EXPLORATORY N/A 12/30/2019    Procedure: LAPAROTOMY, REPAIR OF ULCER PERFORATION;  Surgeon:  Ayan Lopez MD;  Location:  OR     SURGICAL HISTORY OF -       left cataract surgery     SURGICAL HISTORY OF -       right cataract surgery and left laser revision     SURGICAL HISTORY OF -   2015    left carpal tunnel release     TONSILLECTOMY  1974       Prior to Admission Medications   Prior to Admission Medications   Prescriptions Last Dose Informant Patient Reported? Taking?   DULoxetine (CYMBALTA) 60 MG capsule  Nursing Home Yes Yes   Sig: Take 120 mg by mouth every morning   OXYBUTYNIN CHLORIDE PO  Nursing Home Yes Yes   Sig: Take by mouth 2 times daily   VENTOLIN  (90 Base) MCG/ACT inhaler  Nursing Home No Yes   Sig: INHALE 2 PUFFS INTO THE LUNGS EVERY 6 HOURS AS NEEDED FOR SHORTNESS OF BREATH   acetaminophen (TYLENOL) 325 MG tablet  Nursing Home Yes Yes   Sig: Take 650 mg by mouth every 4 hours as needed for mild pain   amLODIPine (NORVASC) 5 MG tablet  Nursing Home No Yes   Si tablet (5 mg) by Oral or Feeding Tube route daily   bisacodyl (DULCOLAX) 10 MG suppository  long term Yes Yes   Sig: Place 10 mg rectally 2 times daily as needed for constipation   blood glucose (NO BRAND SPECIFIED) lancets standard   No No   Sig: Use to test blood sugar 1 times daily or as directed.   blood glucose monitoring (FREESTYLE FREEDOM LITE) meter device kit   No No   Sig: Use to test blood sugar.   blood glucose monitoring (NO BRAND SPECIFIED) meter device kit   No No   Sig: Dispense freestyle zak glucose meter   blood glucose monitoring (NO BRAND SPECIFIED) test strip   No No   Sig: Use to test blood sugars 1 times daily or as directed   collagenase 250 UNIT/GM EX external ointment  Nursing Home Yes Yes   Sig: Apply topically daily Apply to rt abdomen wound every day shift   docusate sodium (COLACE) 100 MG capsule  Nursing Home No Yes   Sig: Take 2 capsules (200 mg) by mouth 2 times daily   doxycycline hyclate (VIBRA-TABS) 100 MG tablet  Nursing Home Yes Yes   Sig: Take 100 mg by  mouth 2 times daily X 10 days   furosemide (LASIX) 40 MG tablet  Nursing Home No Yes   Sig: Take 1 tablet (40 mg) by mouth daily   gabapentin (NEURONTIN) 800 MG tablet  Nursing Home Yes Yes   Sig: Take 1,600 mg by mouth 3 times daily 0800, 1200 and 1800   hypromellose-dextran (ARTIFICAL TEARS) 0.1-0.3 % ophthalmic solution  Nursing Home No Yes   Sig: Place 1 drop into both eyes 3 times daily as needed for dry eyes   ipratropium - albuterol 0.5 mg/2.5 mg/3 mL (DUONEB) 0.5-2.5 (3) MG/3ML neb solution  FCI Yes Yes   Sig: Take 1 vial by nebulization every 6 hours as needed for shortness of breath / dyspnea or wheezing   levalbuterol (XOPENEX) 1.25 MG/3ML neb solution  FCI Yes Yes   Sig: TAKE ONE VIAL BY NEBULIZATION EVERY 8 HOURS AS NEEDED FOR SHORTNESS OF BREATH/DYSPNEA OR WHEEZING   levothyroxine (SYNTHROID/LEVOTHROID) 88 MCG tablet  Nursing Home No Yes   Sig: TAKE 1 TABLET(88 MCG) BY MOUTH DAILY   metFORMIN (GLUCOPHAGE) 500 MG tablet  Nursing Home Yes Yes   Sig: Take 500 mg by mouth 2 times daily (with meals)   methadone (DOLOPHINE) 10 MG tablet  Nursing Home No Yes   Sig: Take 2 tablets (20 mg) by mouth 2 times daily Per Rx bottle take 2 tablets (20mg) in morning, 1 tablet (10mg) in afternoon, 2 tablets (20mg) in evening.   Patient taking differently: Take 10-20 mg by mouth 3 times daily Per Rx bottle take 2 tablets (20mg) in morning, 1 tablet (10mg) in afternoon, 2 tablets (20mg) in evening.    miconazole (MICATIN/MICRO GUARD) 2 % external powder  Nursing Home No Yes   Sig: Apply topically 2 times daily as needed for other (topical candidiasis)   ondansetron (ZOFRAN-ODT) 4 MG ODT tab  Nursing Home No Yes   Sig: Take 1 tablet (4 mg) by mouth every 6 hours as needed for nausea or vomiting   order for DME   No No   Sig: Equipment being ordered: Hospital Bed, total electric (head, foot, and height adjustments), with any type side rails, with mattress   order for DME   No No   Sig: Equipment being  "ordered: Motorized Wheelchair   order for DME   No No   Sig: Equipment being ordered: Trapeze bar for hospital bed   order for DME   No No   Sig: Equipment being ordered: Nebulizer   order for DME   No No   Sig: Equipment being ordered: BIPAP.   15/5, Rate of 12, 2L O2 to keep sats 90-95%.   order for DME   No No   Sig: Equipment being ordered: Nebulizer   oxyCODONE 5 MG PO tablet  Nursing Home Yes No   Sig: Take 5 mg by mouth every 4 hours as needed for severe pain   oxyCODONE HCl (OXAYDO) 5 MG TABA  Nursing Home Yes Yes   Sig: Take 10 mg by mouth daily Prior to physical therapy   pantoprazole sodium (PROTONIX) 40 MG packet  Nursing Home Yes Yes   Sig: Take 1 packet by mouth 2 times daily   polyethylene glycol (MIRALAX/GLYCOLAX) packet  Nursing Home No Yes   Sig: Take 17 g by mouth 2 times daily as needed (constipation)   potassium chloride ER (K-DUR/KLOR-CON M) 20 MEQ CR tablet  Nursing Home No Yes   Sig: Take 1 tablet (20 mEq) by mouth daily   rosuvastatin 5 MG PO tablet  Nursing Home Yes Yes   Sig: Take 5 mg by mouth At Bedtime   senna-docusate (SENOKOT-S;PERICOLACE) 8.6-50 MG per tablet  Nursing Home No Yes   Sig: Take 2 tablets by mouth 2 times daily   Patient taking differently: Take 2 tablets by mouth daily    traZODone (DESYREL) 100 MG tablet  Nursing Home Yes Yes   Sig: Take 100 mg by mouth At Bedtime      Facility-Administered Medications: None     Allergies   Allergies   Allergen Reactions     Povidone Iodine      \"mild skin irritation\" per patient report     Toradol [Ketorolac] Other (See Comments)     Pt gets nightmares     Tramadol Other (See Comments)       Social History   I have reviewed this patient's social history and updated it with pertinent information if needed. Bhavani M White  reports that she has never smoked. She has never used smokeless tobacco. She reports current drug use. Drug: Marijuana. She reports that she does not drink alcohol.    Family History   Family history assessed and, " except as above, is non-contributory.    Family History   Problem Relation Age of Onset     Depression Son      Hypertension Mother      Heart Disease Mother         bypass     Depression Mother      Hypertension Father      Connective Tissue Disorder Father         ankylosing spondylitis     Cancer Father         colon; prostate     Other Cancer Father         bladder cancer     Depression Sister        Review of Systems   The 10 point Review of Systems is negative other than noted in the HPI or here.     Primary Care Physician   Skylar Moore    Data   Labs Ordered and Resulted from Time of ED Arrival Up to the Time of Departure from the ED   CBC WITH PLATELETS DIFFERENTIAL - Abnormal; Notable for the following components:       Result Value    WBC 20.5 (*)     Hemoglobin 9.6 (*)     Hematocrit 31.3 (*)     MCV 74 (*)     MCH 22.8 (*)     MCHC 30.7 (*)     RDW 16.2 (*)     Platelet Count 458 (*)     Absolute Neutrophil 17.0 (*)     Absolute Monocytes 2.5 (*)     All other components within normal limits   COMPREHENSIVE METABOLIC PANEL - Abnormal; Notable for the following components:    Sodium 127 (*)     Chloride 89 (*)     Glucose 164 (*)     Albumin 2.3 (*)     All other components within normal limits   LIPASE - Abnormal; Notable for the following components:    Lipase 17 (*)     All other components within normal limits   ROUTINE UA WITH MICROSCOPIC - Abnormal; Notable for the following components:    Protein Albumin Urine 30 (*)     Leukocyte Esterase Urine Large (*)     WBC Urine 15 (*)     RBC Urine 6 (*)     Bacteria Urine Few (*)     Mucous Urine Present (*)     All other components within normal limits   LACTIC ACID WHOLE BLOOD   CREATININE POCT   ISTAT CREATININE NURSING POCT   PERIPHERAL IV CATHETER   BLOOD CULTURE   BLOOD CULTURE   WOUND CULTURE AEROBIC BACTERIAL   WOUND CULTURE AEROBIC BACTERIAL   URINE CULTURE AEROBIC BACTERIAL       Data reviewed today:  I personally reviewed the  abdominal CT image(s) showing suspected residual intra-abdominal abscesses and bilateral pleural effusions.    Recent Results (from the past 24 hour(s))   CT Chest (PE) Abdomen Pelvis w Contrast    Narrative    EXAM: CT CHEST PE ABDOMEN PELVIS W CONTRAST  LOCATION: [facility]  DATE/TIME: 3/13/2020 10:26 PM    INDICATION: Abdominal pain, new pus from feeding tube, fevers, hypoxia.  COMPARISON: None.  TECHNIQUE: CT angiogram chest and routine CT abdomen pelvis with IV contrast. Arterial phase through the chest and venous phase through the abdomen and pelvis. 2D and 3D MIP reconstructions were preformed by the CT technologist. Dose reduction techniques   were used.   CONTRAST: 83mL [id number]-370    FINDINGS:  ANGIOGRAM CHEST: Pulmonary arteries are normal caliber and negative for pulmonary emboli. Thoracic aorta is negative for dissection. No CT evidence of right heart strain.     LUNGS AND PLEURA: Moderate size right pleural effusion and smaller left pleural effusion with atelectasis involving significant portions of both lower lobes.    MEDIASTINUM/AXILLAE: No enlarged mediastinal or hilar nodes. Coronary artery calcifications. Left ventricular wall calcifications as seen previously, likely related to old infarct.    HEPATOBILIARY: Small amount of ascites adjacent to the liver and within the sundar hepatis. Since the previous examination a percutaneous drain has been removed from the right upper quadrant. There remains a small complex gas and fluid collection within   the mid right abdomen anterior to the liver and just beneath the hepatic flexure measuring 7.5 x 5.2 cm on image 80 of series 11. Additional fluid collection located more superiorly and anteriorly on image 74 measures 7.3 x 2.7 cm is also considerably   smaller, adjacent to the indwelling GJ tube. Additional scattered tiny collections of free intraperitoneal air beneath the anterior abdominal wall.     PANCREAS: Normal.    SPLEEN: Large  loculated fluid collection seen previously within the left upper quadrant adjacent to the spleen is markedly smaller.    ADRENAL GLANDS: Normal.    KIDNEYS/BLADDER: Small left renal cyst. Suprapubic catheter in the bladder.    BOWEL: Percutaneous GJ tube with the retention balloon in the stomach and the tip of the NJ tube located in the proximal jejunum. No evidence for bowel obstruction.    LYMPH NODES: Normal.    PELVIC ORGANS: Hysterectomy.    MUSCULOSKELETAL: Diffuse muscular atrophy. Edematous changes subcutaneous tissues. Osteopenia. Severe degenerative changes throughout the thoracolumbar spine. Scoliosis.      Impression    IMPRESSION:  1.  Right upper quadrant percutaneous drain has been removed. Irregular complex gas and fluid collections within the right upper quadrant are all smaller, but may represent residual abscesses. The anteriorly located collection is adjacent to the   indwelling GJ tube and may account for the purulent drainage. The left upper quadrant collection adjacent to the spleen is also considerably smaller.  2.  Small amount of ascites adjacent to the liver.  3.  Bilateral pleural effusions and bibasilar atelectasis.

## 2020-03-14 NOTE — PROGRESS NOTES
RECEIVING UNIT ED HANDOFF REVIEW    ED Nurse Handoff Report was reviewed by: Funmilayo Brown RN on March 14, 2020 at 12:30 AM

## 2020-03-14 NOTE — PLAN OF CARE
Summary:     DATE & TIME: 3-14-20 [id number]  Cognitive Concerns/ Orientation : Alert and oriented x 4   BEHAVIOR & AGGRESSION TOOL COLOR: Green   CIWA SCORE: N/A   ABNL VS/O2: VSS on RA, desats at night  MOBILITY: Lift, wheelchair at baseline   PAIN MANAGMENT: Complains of generalized pain, oxycodone PRN, methadone scheduled   DIET: Regular, BS checks   BOWEL/BLADDER: Lundberg catheter in place (neurogenic bladder), no Bm this shift   ABNL LAB/BG: WBC 14.6, Hgb 9.6  DRAIN/DEVICES: Peripheral IV SL right AC   TELEMETRY RHYTHM: N/A   SKIN: Pale, 4 abdominal wounds with large amounts of purulent drainage, new dressings at 1345, G/J tubes removed. Mepilex to rubén redness in two places on bottom  TESTS/PROCEDURES: Surgery   D/C DAY/GOALS/PLACE: Pending   OTHER IMPORTANT INFO: Contact isolation for MRSA.  Continues on IV Zosyn. RUQ wound to be packed with iodoform.

## 2020-03-15 ENCOUNTER — HEALTH MAINTENANCE LETTER (OUTPATIENT)
Age: 65
End: 2020-03-15

## 2020-03-15 LAB
ANION GAP SERPL CALCULATED.3IONS-SCNC: 5 MMOL/L (ref 3–14)
BUN SERPL-MCNC: 8 MG/DL (ref 7–30)
CALCIUM SERPL-MCNC: 9.9 MG/DL (ref 8.5–10.1)
CHLORIDE SERPL-SCNC: 96 MMOL/L (ref 94–109)
CO2 SERPL-SCNC: 32 MMOL/L (ref 20–32)
CREAT SERPL-MCNC: 0.64 MG/DL (ref 0.52–1.04)
ERYTHROCYTE [DISTWIDTH] IN BLOOD BY AUTOMATED COUNT: 16.3 % (ref 10–15)
GFR SERPL CREATININE-BSD FRML MDRD: >90 ML/MIN/{1.73_M2}
GLUCOSE SERPL-MCNC: 126 MG/DL (ref 70–99)
HCT VFR BLD AUTO: 31.7 % (ref 35–47)
HGB BLD-MCNC: 9.3 G/DL (ref 11.7–15.7)
MCH RBC QN AUTO: 22.2 PG (ref 26.5–33)
MCHC RBC AUTO-ENTMCNC: 29.3 G/DL (ref 31.5–36.5)
MCV RBC AUTO: 76 FL (ref 78–100)
PLATELET # BLD AUTO: 467 10E9/L (ref 150–450)
POTASSIUM SERPL-SCNC: 3.4 MMOL/L (ref 3.4–5.3)
RBC # BLD AUTO: 4.18 10E12/L (ref 3.8–5.2)
SODIUM SERPL-SCNC: 133 MMOL/L (ref 133–144)
WBC # BLD AUTO: 14.8 10E9/L (ref 4–11)

## 2020-03-15 PROCEDURE — 99231 SBSQ HOSP IP/OBS SF/LOW 25: CPT | Mod: 24 | Performed by: SURGERY

## 2020-03-15 PROCEDURE — 25000132 ZZH RX MED GY IP 250 OP 250 PS 637: Mod: GY | Performed by: HOSPITALIST

## 2020-03-15 PROCEDURE — 25000132 ZZH RX MED GY IP 250 OP 250 PS 637: Mod: GY | Performed by: STUDENT IN AN ORGANIZED HEALTH CARE EDUCATION/TRAINING PROGRAM

## 2020-03-15 PROCEDURE — 80048 BASIC METABOLIC PNL TOTAL CA: CPT | Performed by: STUDENT IN AN ORGANIZED HEALTH CARE EDUCATION/TRAINING PROGRAM

## 2020-03-15 PROCEDURE — 25000128 H RX IP 250 OP 636: Performed by: HOSPITALIST

## 2020-03-15 PROCEDURE — 36415 COLL VENOUS BLD VENIPUNCTURE: CPT | Performed by: STUDENT IN AN ORGANIZED HEALTH CARE EDUCATION/TRAINING PROGRAM

## 2020-03-15 PROCEDURE — 99232 SBSQ HOSP IP/OBS MODERATE 35: CPT | Performed by: STUDENT IN AN ORGANIZED HEALTH CARE EDUCATION/TRAINING PROGRAM

## 2020-03-15 PROCEDURE — 99207 ZZC NON-BILLABLE SERV PER CHARTING: CPT | Performed by: HOSPITALIST

## 2020-03-15 PROCEDURE — 12000000 ZZH R&B MED SURG/OB

## 2020-03-15 PROCEDURE — 85027 COMPLETE CBC AUTOMATED: CPT | Performed by: STUDENT IN AN ORGANIZED HEALTH CARE EDUCATION/TRAINING PROGRAM

## 2020-03-15 RX ADMIN — PIPERACILLIN SODIUM AND TAZOBACTAM SODIUM 3.38 G: 3; .375 INJECTION, POWDER, LYOPHILIZED, FOR SOLUTION INTRAVENOUS at 18:04

## 2020-03-15 RX ADMIN — ROSUVASTATIN CALCIUM 5 MG: 5 TABLET, FILM COATED ORAL at 21:26

## 2020-03-15 RX ADMIN — METHADONE HYDROCHLORIDE 20 MG: 10 TABLET ORAL at 21:26

## 2020-03-15 RX ADMIN — GABAPENTIN 1600 MG: 800 TABLET, FILM COATED ORAL at 09:36

## 2020-03-15 RX ADMIN — PIPERACILLIN SODIUM AND TAZOBACTAM SODIUM 3.38 G: 3; .375 INJECTION, POWDER, LYOPHILIZED, FOR SOLUTION INTRAVENOUS at 12:06

## 2020-03-15 RX ADMIN — SENNOSIDES AND DOCUSATE SODIUM 2 TABLET: 8.6; 5 TABLET ORAL at 22:00

## 2020-03-15 RX ADMIN — OXYCODONE HYDROCHLORIDE 10 MG: 5 TABLET ORAL at 21:25

## 2020-03-15 RX ADMIN — OXYBUTYNIN CHLORIDE 5 MG: 5 TABLET ORAL at 09:36

## 2020-03-15 RX ADMIN — OXYCODONE HYDROCHLORIDE 10 MG: 5 TABLET ORAL at 06:18

## 2020-03-15 RX ADMIN — GABAPENTIN 1600 MG: 800 TABLET, FILM COATED ORAL at 21:26

## 2020-03-15 RX ADMIN — POLYETHYLENE GLYCOL 3350 17 G: 17 POWDER, FOR SOLUTION ORAL at 14:16

## 2020-03-15 RX ADMIN — AMOXICILLIN AND CLAVULANATE POTASSIUM 1 TABLET: 875; 125 TABLET, FILM COATED ORAL at 22:00

## 2020-03-15 RX ADMIN — OXYBUTYNIN CHLORIDE 5 MG: 5 TABLET ORAL at 21:26

## 2020-03-15 RX ADMIN — LEVOTHYROXINE SODIUM 88 MCG: 88 TABLET ORAL at 06:14

## 2020-03-15 RX ADMIN — OXYCODONE HYDROCHLORIDE 10 MG: 5 TABLET ORAL at 16:21

## 2020-03-15 RX ADMIN — TRAZODONE HYDROCHLORIDE 100 MG: 100 TABLET ORAL at 21:26

## 2020-03-15 RX ADMIN — GABAPENTIN 1600 MG: 800 TABLET, FILM COATED ORAL at 16:21

## 2020-03-15 RX ADMIN — AMLODIPINE BESYLATE 5 MG: 5 TABLET ORAL at 09:36

## 2020-03-15 RX ADMIN — PANTOPRAZOLE SODIUM 40 MG: 40 TABLET, DELAYED RELEASE ORAL at 06:14

## 2020-03-15 RX ADMIN — METHADONE HYDROCHLORIDE 10 MG: 10 TABLET ORAL at 13:42

## 2020-03-15 RX ADMIN — METHADONE HYDROCHLORIDE 20 MG: 10 TABLET ORAL at 09:36

## 2020-03-15 RX ADMIN — FUROSEMIDE 40 MG: 40 TABLET ORAL at 09:36

## 2020-03-15 RX ADMIN — PIPERACILLIN SODIUM AND TAZOBACTAM SODIUM 3.38 G: 3; .375 INJECTION, POWDER, LYOPHILIZED, FOR SOLUTION INTRAVENOUS at 06:13

## 2020-03-15 RX ADMIN — DULOXETINE HYDROCHLORIDE 120 MG: 60 CAPSULE, DELAYED RELEASE ORAL at 09:36

## 2020-03-15 ASSESSMENT — ACTIVITIES OF DAILY LIVING (ADL)
ADLS_ACUITY_SCORE: 23
ADLS_ACUITY_SCORE: 25

## 2020-03-15 NOTE — PROGRESS NOTES
Surgery    Looks and feels much better. No fevers. Drainage greatly reduced since tubes removed. Brandon diet without issues. NO BM for a few days.     Abd- Midline wound healing well. LUQ and LLQ drain sites closing with minimal drainage or erythema. RUQ site with much less drainage. No erythema    A/P  Greatly improved. Would pack RUQ wound with iodoform wick for now. Other drain sites and midline wound can be covered with gauze and changed as needed. Continue ABX and convert to oral upon discharge. No need for for further invention and can be discharge from surgical standpoint when medically appropriate    Ayan Lopez M.D.  [facility]  [phone]

## 2020-03-15 NOTE — PROGRESS NOTES
DATE & TIME: 3/15/2020 8029-2384    Cognitive Concerns/ Orientation : A&Ox4   BEHAVIOR & AGGRESSION TOOL COLOR: green  CIWA SCORE: na   ABNL VS/O2: VSS on RA, temp 99.2 axillary, recheck 98.9 oral.   MOBILITY: Ax2 with lift. Pt wheelchair at baseline. T/R q2hrs   PAIN MANAGMENT: per pt 5/10 is baseline for pain. On scheduled methadone, PRN oxy available   DIET: regular   BOWEL/BLADDER: suprapubic catheter in place (neurogenic bladder). Pt complains of constipation.  ABNL LAB/BG: Refused BG check. WBC 14.6   DRAIN/DEVICES: PIV SL  TELEMETRY RHYTHM: na  SKIN: 4 abdominal wounds, small amounts of drainage on dressings. Dressings CDI. Mepilex in place x2 on bottom due to blanchable redness, CDI  TESTS/PROCEDURES: surgery consult 3/14  D/C DAY/GOALS/PLACE: pending   OTHER IMPORTANT INFO: contact isolation maintained. IV Zosyn. Continue to monitor.

## 2020-03-15 NOTE — PROGRESS NOTES
[facility]    Medicine Progress Note - Hospitalist Service       Date of Admission:  3/13/2020  Date of Service: 03/15/2020    Assessment & Plan      Bhavani White is a 64 year old woman with past history that is most notable for severe morbid obesity (BMI near 60), PUD, chronic pain on Methadone, ANIYA, neurogenic bladder due to spinal stenosis, hypothyroid, Type 2 Diabetes mellitus, asthma, hypertension and dyslipidemia who underwent emergent repair of perforated duodenal ulcer 12/29/2019 with subsequent prolonged hospitalizations for intra-abdominal infections; who now presents for purulent drainage from her abdominal wounds and is found to have suspected sepsis due to abdominal cellulitis and possible intra-abdominal infections.     PLAN     1) Suspected abdominal cellulitis        Possible intra-abdominal infections:  Assessment: presents with new onset purulent drainage from several wound sites and burning epigastric discomfort. She has fever, tachycardia, leukocytosis and thrombocytosis. CT shows new or ongoing intra-abdominal fluid collections; I suspect she has staphyloccocal cellulitis and possibly also polymicrobial intra-abdominal abscesses.  -- Surgery following.   -- Regular Diet  -- Zosyn continued empirically as we follow up cultures. -> okay for augmentin at discharge.   -- Tylenol, Oxycodone, IV Dilaudid as needed for pain. Anti-emetics as needed.    -- For her PUD she takes Protonix PO BID; continued     2) Hypoxia: It seems by CT scans that she has new or worsening bilateral pleural effusions. These could be due to acute systolic or diastolic CHF exacerbation. She has been taking lasix daily at home. Currently her hypoxia has now resolved. TTE this admission shows  ejection fraction is estimated at 60-65%. Grade II or moderate diastolic dysfunction.  Plan:  -- Resume 40 mg PO Lasix      3) Anemia: Chronic  Assessment: Hgb stable, possibly due to ongoing illness. Monitor closely  while hospitalized     4) Hypochloremic hyponatremia: Possibly hypovolemic; normalized with IVF     5) ANIYA: It seems she may be intermittently adherent to CPAP.     6) Chronic pain and neurogenic bladder: It seems she has severe spinal stenosis and takes Methadone as an outpatient. She was reportedly wheelchair bound prior to 12/2019, and has had a supra-pubic catheter in place for chronic retention.  Plan:  - resume PTA regimen of Cymbalta, trazodone and Neurontin as clinically able when verified     7) Type 2 Diabetes: Most recent A1c 7.1 in 1/2020.  -- Hold Metformin;   --ISS insulin ordered while hospitalized     8) Asthma: Resume home inhalers when verified     9) Hypertension, dyslipidemia: Resume Norvasc, statin.     10) Hypothyroid: Resume Synthroid when verified      Diet: Regular Diet Adult  Snacks/Supplements Adult: Other; chocolate magic cup and 4oz PLUS2 with meals (RD); With Meals    DVT Prophylaxis: Pneumatic Compression Devices  Lundberg Catheter: in place, indication: Neurogenic Bladder  Code Status: Full Code      Disposition Plan   Expected discharge: Tomorrow, recommended to prior living arrangement once adequate pain management/ tolerating PO medications and antibiotic plan established.  Entered: Constantin Duran MD 03/15/2020, 3:01 PM       The patient's care was discussed with the Patient.    Constantin Duran MD  Hospitalist Service  [facility]    ______________________________________________________________________    Interval History     Feels improved, tolerating diet  Pain stable. Now off O2  No CP/SOB.  No nausea/vomiting but feels constipated  No chills or fevers. She has no other complaints today.     Data reviewed today: I reviewed all medications, new labs and imaging results over the last 24 hours. I personally reviewed no images or EKG's today.    Physical Exam   Vital Signs: Temp: 98.7  F (37.1  C) Temp src: Oral BP: 108/57 Pulse: 85   Resp: 16 SpO2: 95 % O2 Device: None (Room  air)    Weight: 238 lbs 1.55 oz    Constitutional: no apparent distress  Respiratory: CTABL  Cardiovascular: regular S1 S2   GI: abdomen soft, tender around her tube sites and post-surgical wounds, which have improving tender erythema; BS present.   Musculoskeletal: mild to moderate bilateral LE edema  Neurologic: extra-ocular muscles intact; moves all four extremities  Psychiatric: appropriate affect, speech non-tangential    Data   Recent Labs   Lab 03/15/20  0840 03/14/20  0916 03/13/20  2122   WBC 14.8* 14.6* 20.5*   HGB 9.3* 9.6* 9.6*   MCV 76* 76* 74*   * 414 458*    134 127*   POTASSIUM 3.4 3.4 3.4   CHLORIDE 96 97 89*   CO2 32 32 30   BUN 8 9 10   CR 0.64 0.65 0.61   ANIONGAP 5 5 8   DORY 9.9 9.8 9.6   * 127* 164*   ALBUMIN  --  2.0* 2.3*   PROTTOTAL  --  6.6* 7.1   BILITOTAL  --  0.4 0.5   ALKPHOS  --  121 133   ALT  --  24 24   AST  --  34 33   LIPASE  --   --  17*     No results found for this or any previous visit (from the past 24 hour(s)).  Medications       amLODIPine  5 mg Oral or Feeding Tube Daily     DULoxetine  120 mg Oral QAM     furosemide  40 mg Oral Daily     gabapentin  1,600 mg Oral TID     insulin aspart  1-6 Units Subcutaneous TID w/meals     levothyroxine  88 mcg Oral Daily     methadone  10 mg Oral Daily     methadone  20 mg Oral BID     oxybutynin  5 mg Oral BID     pantoprazole  40 mg Oral QAM AC     piperacillin-tazobactam  3.375 g Intravenous Q6H     rosuvastatin  5 mg Oral At Bedtime     sodium chloride (PF)  3 mL Intracatheter Q8H     traZODone  100 mg Oral At Bedtime

## 2020-03-15 NOTE — PLAN OF CARE
DATE & TIME: 3/15/2020 9321-8088                       Cognitive Concerns/ Orientation : A&Ox4   BEHAVIOR & AGGRESSION TOOL COLOR: green  CIWA SCORE: na      ABNL VS/O2: VSS on RA  MOBILITY: Ax2 with lift. Pt wheelchair at baseline. T/R q2hrs, pt likes to independently position  PAIN MANAGMENT: per pt 5/10 is baseline for pain. On scheduled methadone, PRN oxy available   DIET: regular   BOWEL/BLADDER: suprapubic catheter in place (neurogenic bladder). Pt complains of constipation.  ABNL LAB/BG: Refused lunch time BG check. WBC 14.8, Hgb 9.3   DRAIN/DEVICES: PIV SL  TELEMETRY RHYTHM: na  SKIN: 4 abdominal wounds, small amounts of drainage on dressings. Dressings CDI. Mepilex in place x2 on bottom due to blanchable redness, CDI  TESTS/PROCEDURES: surgery consulted  D/C DAY/GOALS/PLACE: pending  OTHER IMPORTANT INFO: contact isolation maintained. IV Zosyn. Continue to monitor.

## 2020-03-15 NOTE — PLAN OF CARE
DATE & TIME: 3/14/20 5092-8166  Cognitive Concerns/ Orientation: A&Ox4  BEHAVIOR & AGGRESSION TOOL COLOR: Green   CIWA SCORE: N/A   ABNL VS/O2: VSS on room air, can desat at night.  MOBILITY: Ax2 + lift, wheelchair at baseline. Turn & repo q2hr  PAIN MANAGMENT: Complains of generalized pain, methadone scheduled. PRN oxy x1 for back/abdominal pain.   DIET: Regular, BS checks QID  BOWEL/BLADDER: Suprapubic catheter in place (neurogenic bladder). C/o constipation; PRN senna given.   ABNL LAB/BG: WBC 14.6, Hgb 9.6  DRAIN/DEVICES: Peripheral IV SL; intermittent abx  TELEMETRY RHYTHM: N/A   SKIN: Pale, 4 abdominal wounds with moderate amounts of drainage. Frequent dressing changes. G/J tubes removed. Mepilex to rubén redness in two places on bottom; changed, CDI.  TESTS/PROCEDURES: Surgery consult today.   D/C DAY/GOALS/PLACE: Pending   OTHER IMPORTANT INFO: Contact isolation for MRSA. Continues on IV Zosyn. Aquacel to midline.

## 2020-03-16 VITALS
TEMPERATURE: 98.8 F | RESPIRATION RATE: 18 BRPM | WEIGHT: 238.1 LBS | SYSTOLIC BLOOD PRESSURE: 105 MMHG | HEART RATE: 70 BPM | OXYGEN SATURATION: 93 % | BODY MASS INDEX: 39.62 KG/M2 | DIASTOLIC BLOOD PRESSURE: 58 MMHG

## 2020-03-16 PROBLEM — N17.9 AKI (ACUTE KIDNEY INJURY) (H): Status: RESOLVED | Noted: 2020-01-18 | Resolved: 2020-03-16

## 2020-03-16 PROBLEM — K65.1 INTRA-ABDOMINAL ABSCESS (H): Status: RESOLVED | Noted: 2020-03-14 | Resolved: 2020-03-16

## 2020-03-16 PROBLEM — G92.9 TOXIC ENCEPHALOPATHY: Status: RESOLVED | Noted: 2020-01-18 | Resolved: 2020-03-16

## 2020-03-16 PROBLEM — D62 ACUTE BLOOD LOSS ANEMIA: Status: RESOLVED | Noted: 2020-01-18 | Resolved: 2020-03-16

## 2020-03-16 PROBLEM — A41.9 SEPSIS (H): Status: ACTIVE | Noted: 2020-03-16

## 2020-03-16 PROBLEM — R10.9 ABDOMINAL PAIN: Status: RESOLVED | Noted: 2020-01-26 | Resolved: 2020-03-16

## 2020-03-16 PROBLEM — J96.01 ACUTE RESPIRATORY FAILURE WITH HYPOXIA (H): Status: RESOLVED | Noted: 2019-12-30 | Resolved: 2020-03-16

## 2020-03-16 PROBLEM — R65.21 SEPTIC SHOCK (H): Status: RESOLVED | Noted: 2019-12-30 | Resolved: 2020-03-16

## 2020-03-16 PROBLEM — L03.311 ABDOMINAL WALL CELLULITIS: Status: ACTIVE | Noted: 2020-03-16

## 2020-03-16 PROBLEM — B37.9 INFECTION DUE TO CANDIDA GLABRATA: Status: RESOLVED | Noted: 2020-02-01 | Resolved: 2020-03-16

## 2020-03-16 PROBLEM — A49.1 INFECTION DUE TO ALPHA-HEMOLYTIC STREPTOCOCCUS: Status: RESOLVED | Noted: 2020-02-01 | Resolved: 2020-03-16

## 2020-03-16 PROBLEM — D72.829 LEUKOCYTOSIS: Status: RESOLVED | Noted: 2020-02-01 | Resolved: 2020-03-16

## 2020-03-16 PROBLEM — A41.9 SEPTIC SHOCK (H): Status: RESOLVED | Noted: 2019-12-30 | Resolved: 2020-03-16

## 2020-03-16 LAB
ANION GAP SERPL CALCULATED.3IONS-SCNC: 5 MMOL/L (ref 3–14)
BUN SERPL-MCNC: 9 MG/DL (ref 7–30)
CALCIUM SERPL-MCNC: 10.2 MG/DL (ref 8.5–10.1)
CHLORIDE SERPL-SCNC: 95 MMOL/L (ref 94–109)
CO2 SERPL-SCNC: 34 MMOL/L (ref 20–32)
CREAT SERPL-MCNC: 0.6 MG/DL (ref 0.52–1.04)
ERYTHROCYTE [DISTWIDTH] IN BLOOD BY AUTOMATED COUNT: 16.8 % (ref 10–15)
GFR SERPL CREATININE-BSD FRML MDRD: >90 ML/MIN/{1.73_M2}
GLUCOSE BLDC GLUCOMTR-MCNC: 117 MG/DL (ref 70–99)
GLUCOSE SERPL-MCNC: 128 MG/DL (ref 70–99)
HCT VFR BLD AUTO: 33.7 % (ref 35–47)
HGB BLD-MCNC: 10.1 G/DL (ref 11.7–15.7)
MCH RBC QN AUTO: 22.3 PG (ref 26.5–33)
MCHC RBC AUTO-ENTMCNC: 30 G/DL (ref 31.5–36.5)
MCV RBC AUTO: 75 FL (ref 78–100)
PLATELET # BLD AUTO: 513 10E9/L (ref 150–450)
POTASSIUM SERPL-SCNC: 3.5 MMOL/L (ref 3.4–5.3)
RBC # BLD AUTO: 4.52 10E12/L (ref 3.8–5.2)
SODIUM SERPL-SCNC: 134 MMOL/L (ref 133–144)
WBC # BLD AUTO: 15 10E9/L (ref 4–11)

## 2020-03-16 PROCEDURE — 80048 BASIC METABOLIC PNL TOTAL CA: CPT | Performed by: STUDENT IN AN ORGANIZED HEALTH CARE EDUCATION/TRAINING PROGRAM

## 2020-03-16 PROCEDURE — 99239 HOSP IP/OBS DSCHRG MGMT >30: CPT | Performed by: INTERNAL MEDICINE

## 2020-03-16 PROCEDURE — 85027 COMPLETE CBC AUTOMATED: CPT | Performed by: STUDENT IN AN ORGANIZED HEALTH CARE EDUCATION/TRAINING PROGRAM

## 2020-03-16 PROCEDURE — 36415 COLL VENOUS BLD VENIPUNCTURE: CPT | Performed by: STUDENT IN AN ORGANIZED HEALTH CARE EDUCATION/TRAINING PROGRAM

## 2020-03-16 PROCEDURE — 25000132 ZZH RX MED GY IP 250 OP 250 PS 637: Mod: GY | Performed by: HOSPITALIST

## 2020-03-16 PROCEDURE — 25000132 ZZH RX MED GY IP 250 OP 250 PS 637: Mod: GY | Performed by: STUDENT IN AN ORGANIZED HEALTH CARE EDUCATION/TRAINING PROGRAM

## 2020-03-16 PROCEDURE — 00000146 ZZHCL STATISTIC GLUCOSE BY METER IP

## 2020-03-16 RX ORDER — OXYCODONE HYDROCHLORIDE 5 MG/1
5 TABLET ORAL EVERY 4 HOURS PRN
Qty: 10 TABLET | Refills: 0 | Status: SHIPPED | OUTPATIENT
Start: 2020-03-16 | End: 2020-05-18

## 2020-03-16 RX ORDER — METHADONE HYDROCHLORIDE 10 MG/1
20 TABLET ORAL 2 TIMES DAILY
Qty: 20 TABLET | Refills: 0 | Status: SHIPPED | OUTPATIENT
Start: 2020-03-16 | End: 2023-01-01

## 2020-03-16 RX ADMIN — GABAPENTIN 1600 MG: 800 TABLET, FILM COATED ORAL at 09:55

## 2020-03-16 RX ADMIN — FUROSEMIDE 40 MG: 40 TABLET ORAL at 09:56

## 2020-03-16 RX ADMIN — AMLODIPINE BESYLATE 5 MG: 5 TABLET ORAL at 09:55

## 2020-03-16 RX ADMIN — METHADONE HYDROCHLORIDE 10 MG: 10 TABLET ORAL at 13:21

## 2020-03-16 RX ADMIN — GABAPENTIN 1600 MG: 800 TABLET, FILM COATED ORAL at 15:12

## 2020-03-16 RX ADMIN — METHADONE HYDROCHLORIDE 20 MG: 10 TABLET ORAL at 09:56

## 2020-03-16 RX ADMIN — AMOXICILLIN AND CLAVULANATE POTASSIUM 1 TABLET: 875; 125 TABLET, FILM COATED ORAL at 09:55

## 2020-03-16 RX ADMIN — OXYBUTYNIN CHLORIDE 5 MG: 5 TABLET ORAL at 09:55

## 2020-03-16 RX ADMIN — OXYCODONE HYDROCHLORIDE 10 MG: 5 TABLET ORAL at 15:12

## 2020-03-16 RX ADMIN — SENNOSIDES AND DOCUSATE SODIUM 2 TABLET: 8.6; 5 TABLET ORAL at 16:42

## 2020-03-16 RX ADMIN — POLYETHYLENE GLYCOL 3350 17 G: 17 POWDER, FOR SOLUTION ORAL at 09:21

## 2020-03-16 RX ADMIN — SENNOSIDES AND DOCUSATE SODIUM 2 TABLET: 8.6; 5 TABLET ORAL at 13:21

## 2020-03-16 RX ADMIN — LEVOTHYROXINE SODIUM 88 MCG: 88 TABLET ORAL at 09:56

## 2020-03-16 RX ADMIN — PANTOPRAZOLE SODIUM 40 MG: 40 TABLET, DELAYED RELEASE ORAL at 09:56

## 2020-03-16 RX ADMIN — DULOXETINE HYDROCHLORIDE 120 MG: 60 CAPSULE, DELAYED RELEASE ORAL at 09:56

## 2020-03-16 ASSESSMENT — ACTIVITIES OF DAILY LIVING (ADL)
ADLS_ACUITY_SCORE: 27
ADLS_ACUITY_SCORE: 25
ADLS_ACUITY_SCORE: 27
ADLS_ACUITY_SCORE: 23
ADLS_ACUITY_SCORE: 23

## 2020-03-16 NOTE — PROGRESS NOTES
Planning hospital discharge today per direction of Surgery and conversation with rounding Hospitalist.  Pt has a bed hold at [facility].  SW is aware. Pt will need to transfer via stretcher.

## 2020-03-16 NOTE — PLAN OF CARE
PATIENT a&OX4. W/c bound at baseline. Up with lift. Turn q 2 hrs. Patient refusing Blood glucose checks. Patient also refused IV placement. MD aware. Changed from IV ABX to oral ABX.  Dressings on abd changed this shift. Moderate amount of purulent drainage noted. Lungs clear. On Ra. No edema noted. C/o abd pain PRN oxy foor pain.  Continued contact iso.

## 2020-03-16 NOTE — PLAN OF CARE
DATE & TIME: 3/16/2020 3585-1740  Cognitive Concerns/ Orientation : A&O x4, calm and cooperative   BEHAVIOR & AGGRESSION TOOL COLOR: Green  ABNL VS/O2: VSS on RA  MOBILITY: Total care, turn and repo Q2H; refusing to reposition   PAIN MANAGMENT: C/o generalized pain in back and abdomen. Has scheduled Methadone  DIET: Regular  BOWEL/BLADDER: Nguyen in place, patent. Pt complaining of constipation, Miralax given x1, Senna given x1  ABNL LAB/BG:  (refused lunch BS check), WBC 15.0, Plts 513, Hgb 10.1  DRAIN/DEVICES: No IV access, nguyen  SKIN: Refused posterior skin check, abdominal wound dressings changed. Abdominal wounds draining purulent, yellow drainage.  TESTS/PROCEDURES: None  D/C DAY/GOALS/PLACE: Discharging today back to [facility] @ 1800  OTHER IMPORTANT INFO: LS diminished, BS hypoactive x4. Contact precautions maintained. Pt reports numbness and tingling in hands and feet per baseline. SW following.

## 2020-03-16 NOTE — PROGRESS NOTES
DATE & TIME: 3/16/2020 8862-1275    Cognitive Concerns/ Orientation : A&Ox4   BEHAVIOR & AGGRESSION TOOL COLOR: green  CIWA SCORE: na   ABNL VS/O2: VSS on RA  MOBILITY: A2 with lift. Pt is wheelchair bound at baseline. Refusing repositioning, pt educated and still declining, states she is doing small repositioning by herself   PAIN MANAGMENT: 5/10 pain baseline per pt, declined intervention on shift  DIET: regular diet   BOWEL/BLADDER: suprapubic catheter in place, pt complaining of constipation and wants more aggressive treatment today 3/16  ABNL LAB/BG: refusing BG checks, WBC 14.8 Hgb 9.3  DRAIN/DEVICES: no IV access, refusing new IV, MD aware  TELEMETRY RHYTHM: na  SKIN: 4 abdominal wounds covered with small drainage. Mepliex in place x2 on bottom due to blanchable redness, CDI  TESTS/PROCEDURES: surgery consulted   D/C DAY/GOALS/PLACE: pending   OTHER IMPORTANT INFO: contact isolation maintained. Switched to PO Augmentin due to no IV access.

## 2020-03-16 NOTE — PROGRESS NOTES
Discharge    Patient discharged to [facility] via Stretcher with HE transport     Listed belongings gathered and returned to patient. Yes  Care Plan and Patient education resolved: Yes  Prescriptions if needed, hard copies sent with patient  Yes  Home and hospital acquired medications returned to patient: Yes  Medication Bin checked and emptied on discharge Yes  Follow up appointment made for patient: No

## 2020-03-16 NOTE — PROGRESS NOTES
SW:  D:  Per care coordinator, patient plans to return to [facility].  Patient is in a private room at [facility] due to her MRSA dx from 1/2020.    [facility] BLS will transport at 1800.  Orders and script faxed to [facility] Liaison at [phone].      Bedside nurse aware of discharge time and she will update patient.

## 2020-03-16 NOTE — DISCHARGE INSTRUCTIONS
half-inch iodoform packing twice daily.   RUQ wound with iodoform wick for now. Other drain sites and midline wound can be covered with gauze and changed as needed.

## 2020-03-16 NOTE — PROGRESS NOTES
Hospitalist cross cover note:    Paged by RN that patient refusing IV placement and IV abx despite discussing multiple times.  Zosyn changed to augmentin.    Irish Arzate MD  Hospitalist.

## 2020-03-16 NOTE — DISCHARGE SUMMARY
"[facility]    Discharge Summary  Hospitalist    Date of Admission:  3/13/2020  Date of Discharge:  3/16/2020   Discharging Provider: Khurram Moe MD    Discharge Diagnoses   Principal Problem:    Sepsis (WBC 20K, )      Abdominal wall cellulitis      Perforated duodenal ulcer -- S/P Surgery 1/1/2020    Active Problems:    DM type 2, w Neuropathy -- Hgb A1C 7.1 on 1/7/20      Morbid obesity -- BMI 40.0      Perforated duodenal ulcer -- S/P Surgery 1/1/2020      ANIYA -- noncompliant with CPAP      Chronic pain -- on methadone    History of Present Illness   64 year old woman with past history that is most notable for severe morbid obesity (BMI near 60), PUD, chronic pain on Methadone, ANIYA, neurogenic bladder due to spinal stenosis, hypothyroid, Type 2 Diabetes mellitus, asthma, hypertension and dyslipidemia who underwent emergent repair of perforated duodenal ulcer 12/29/2019 with subsequent prolonged hospitalizations for intra-abdominal infections; who now presents for purulent drainage from her abdominal wounds and is found to have suspected sepsis due to abdominal cellulitis and possible intra-abdominal infections.    In the ED, Blood pressure 132/78, pulse 104, SpO2 (!) 87 %, not currently breastfeeding.    CBC showed WBC 21, HGB 9.6, . Lactate 1.5. CMP was done and notable for Na 127, Cl 89, Alb 2.3, otherwise was within normal reference range. UA showed 15 WBC, 6 RBC. G tube and wound cultures as well as blood and urine cultures were sent.     CT chest, abdomen and pelvis with PE protocol was done and this showed:  \"IMPRESSION:  1.  Right upper quadrant percutaneous drain has been removed. Irregular complex gas and fluid collections within the right upper quadrant are all smaller, but may represent residual abscesses. The anteriorly located collection is adjacent to the   indwelling GJ tube and may account for the purulent drainage. The left upper quadrant collection " "adjacent to the spleen is also considerably smaller.  2.  Small amount of ascites adjacent to the liver.  3.  Bilateral pleural effusions and bibasilar atelectasis.\"     She was given Vancomycin and Zosyn as well as Dilaudid and IV fluid in the ED.    Hospital Course   Admitted medical floor, seen by general surgery, antibiotics Switched to Augmentin after small amount of Enterococcus and Candida grew from wound culture.  UA with 15 WBC's and UC grew 50K of 2 strains of Pseudomonas, probably colonized related to suprapubic catheter, and not true infection.     Clinically improved, WBC 14.8 at discharge, will continue Augmentin 875 mg bid for an additional week, and monitor wound drainage.        Khurram Moe MD, MD  Pager: [phone]  Cell Phone:  [phone]       Significant Results and Procedures   As above    Pending Results   These results will be followed up by Dr. Moe  Unresulted Labs Ordered in the Past 30 Days of this Admission     Date and Time Order Name Status Description    3/13/2020 2249 Urine Culture Aerobic Bacterial Preliminary     3/13/2020 2137 Wound Culture Aerobic Bacterial Preliminary     3/13/2020 2137 Wound Culture Aerobic Bacterial Preliminary     3/13/2020 2058 Blood culture Preliminary     3/13/2020 2058 Blood culture Preliminary           Code Status   Full Code       Primary Care Physician   Skylar Moore    Physical Exam   Temp: 98.6  F (37  C) Temp src: Oral BP: 124/68 Pulse: 83   Resp: 18 SpO2: 90 % O2 Device: None (Room air)    Vitals:    03/14/20 0200   Weight: 108 kg (238 lb 1.6 oz)     Vital Signs with Ranges  Temp:  [97.7  F (36.5  C)-98.6  F (37  C)] 98.6  F (37  C)  Pulse:  [77-95] 83  Resp:  [16-18] 18  BP: (110-130)/(34-68) 124/68  SpO2:  [90 %-99 %] 90 %  I/O last 3 completed shifts:  In: 720 [P.O.:720]  Out: 5200 [Urine:5200]    Exam on discharge:   Lungs clear  Abdominal wound -- horizontal incision with slight surround erythema, and wound " gauze packed.     Discharge Disposition   Discharged to short-term care facility  Condition at discharge: Stable    Consultations This Hospital Stay   SURGERY GENERAL IP CONSULT  PHARMACY TO DOSE VANCO  SOCIAL WORK IP CONSULT  SOCIAL WORK IP CONSULT  PHYSICAL THERAPY ADULT IP CONSULT  OCCUPATIONAL THERAPY ADULT IP CONSULT    Time Spent on this Encounter   I spent a total of 35 minutes discharging this patient.     Discharge Orders      General info for SNF    Length of Stay Estimate: Short Term Care: Estimated # of Days <30  Condition at Discharge: Improving  Level of care:skilled   Rehabilitation Potential: Good  Admission H&P remains valid and up-to-date: Yes  Recent Chemotherapy: N/A  Use Nursing Home Standing Orders: Yes     Mantoux instructions    Give two-step Mantoux (PPD) Per Facility Policy Yes -- if not done in the last 2 months.     Reason for your hospital stay    Infected abdominal wound.     Glucose monitor nursing POCT    Before meals 3 times day.     Additional Discharge Instructions    Call Dr. Livingston if any medical questions at Cell Phone [phone].     Activity - Up with nursing assistance     Follow Up and recommended labs and tests    Follow up with Nursing home physician in 1 week, and follow-up with Dr. Lopez, general surgery, in 3 weeks.     Wound care    To abdominal wound -- pack daily with gauze     Additional Discharge Instructions    CPAP at nighttime with home settings, for ANIYA     Full Code     Physical Therapy Adult Consult    Evaluate and treat as clinically indicated.    Reason:  Weakness     Occupational Therapy Adult Consult    Evaluate and treat as clinically indicated.    Reason:  Assist with ADL's     Advance Diet as Tolerated    Follow this diet upon discharge: Orders Placed This Encounter      Snacks/Supplements Adult: Other; chocolate magic cup and 4oz PLUS2 with meals (RD); With Meals      Regular Diet Adult     Discharge Medications   Current Discharge Medication  List      START taking these medications    Details   amoxicillin-clavulanate (AUGMENTIN) 875-125 MG tablet Take 1 tablet by mouth every 12 hours for 7 days  Qty: 14 tablet, Refills: 0    Associated Diagnoses: Intra-abdominal abscess (H)      insulin aspart (NOVOLOG PEN) 100 UNIT/ML pen 3 times a day with meals, for glucometer 150-200 give 2 units SQ, 201-250 give 4 units, >250 give 6 units  Qty:  , Refills: 0    Associated Diagnoses: Type 2 diabetes mellitus with diabetic nephropathy, without long-term current use of insulin (H)         CONTINUE these medications which have CHANGED    Details   methadone (DOLOPHINE) 10 MG tablet Take 2 tablets (20 mg) by mouth 2 times daily  Qty: 20 tablet, Refills: 0    Associated Diagnoses: Controlled substance agreement signed      oxyCODONE (ROXICODONE) 5 MG tablet Take 1 tablet (5 mg) by mouth every 4 hours as needed for severe pain  Qty: 10 tablet, Refills: 0    Associated Diagnoses: Controlled substance agreement signed         CONTINUE these medications which have NOT CHANGED    Details   acetaminophen (TYLENOL) 325 MG tablet Take 650 mg by mouth every 4 hours as needed for mild pain      amLODIPine (NORVASC) 5 MG tablet 1 tablet (5 mg) by Oral or Feeding Tube route daily  Refills: 0    Associated Diagnoses: Hypertension goal BP (blood pressure) < 140/90      bisacodyl (DULCOLAX) 10 MG suppository Place 10 mg rectally 2 times daily as needed for constipation      collagenase 250 UNIT/GM EX external ointment Apply topically daily Apply to rt abdomen wound every day shift      docusate sodium (COLACE) 100 MG capsule Take 2 capsules (200 mg) by mouth 2 times daily  Refills: 0    Associated Diagnoses: Drug-induced constipation      DULoxetine (CYMBALTA) 60 MG capsule Take 120 mg by mouth every morning      furosemide (LASIX) 40 MG tablet Take 1 tablet (40 mg) by mouth daily  Refills: 0    Associated Diagnoses: Hypertension goal BP (blood pressure) < 140/90      gabapentin  (NEURONTIN) 800 MG tablet Take 1,600 mg by mouth 3 times daily 0800, 1200 and 1800      hypromellose-dextran (ARTIFICAL TEARS) 0.1-0.3 % ophthalmic solution Place 1 drop into both eyes 3 times daily as needed for dry eyes  Refills: 0    Associated Diagnoses: Fibromyalgia      ipratropium - albuterol 0.5 mg/2.5 mg/3 mL (DUONEB) 0.5-2.5 (3) MG/3ML neb solution Take 1 vial by nebulization every 6 hours as needed for shortness of breath / dyspnea or wheezing      levothyroxine (SYNTHROID/LEVOTHROID) 88 MCG tablet TAKE 1 TABLET(88 MCG) BY MOUTH DAILY  Qty: 90 tablet, Refills: 0    Associated Diagnoses: Hypothyroidism due to acquired atrophy of thyroid      metFORMIN (GLUCOPHAGE) 500 MG tablet Take 500 mg by mouth 2 times daily (with meals)      miconazole (MICATIN/MICRO GUARD) 2 % external powder Apply topically 2 times daily as needed for other (topical candidiasis)  Refills: 0    Associated Diagnoses: Morbid obesity, unspecified obesity type (H)      ondansetron (ZOFRAN-ODT) 4 MG ODT tab Take 1 tablet (4 mg) by mouth every 6 hours as needed for nausea or vomiting    Associated Diagnoses: Abdominopelvic abscess (H)      OXYBUTYNIN CHLORIDE PO Take by mouth 2 times daily      pantoprazole sodium (PROTONIX) 40 MG packet Take 1 packet by mouth 2 times daily      polyethylene glycol (MIRALAX/GLYCOLAX) packet Take 17 g by mouth 2 times daily as needed (constipation)  Refills: 0    Associated Diagnoses: Drug-induced constipation      potassium chloride ER (K-DUR/KLOR-CON M) 20 MEQ CR tablet Take 1 tablet (20 mEq) by mouth daily  Refills: 0    Associated Diagnoses: Hypertension goal BP (blood pressure) < 140/90      rosuvastatin 5 MG PO tablet Take 5 mg by mouth At Bedtime      senna-docusate (SENOKOT-S;PERICOLACE) 8.6-50 MG per tablet Take 2 tablets by mouth 2 times daily  Qty: 100 tablet    Associated Diagnoses: Acute respiratory failure with hypoxia and hypercapnia (H)      traZODone (DESYREL) 100 MG tablet Take 100 mg by  mouth At Bedtime      VENTOLIN  (90 Base) MCG/ACT inhaler INHALE 2 PUFFS INTO THE LUNGS EVERY 6 HOURS AS NEEDED FOR SHORTNESS OF BREATH  Qty: 18 g, Refills: 0    Associated Diagnoses: Moderate persistent asthma with acute exacerbation      blood glucose (NO BRAND SPECIFIED) lancets standard Use to test blood sugar 1 times daily or as directed.  Qty: 100 each, Refills: 11    Comments: Dispense per pt insurance per pt preference  Associated Diagnoses: Type 2 diabetes mellitus with diabetic nephropathy (H)      blood glucose monitoring (NO BRAND SPECIFIED) meter device kit Dispense freestyle zak glucose meter  Qty: 1 kit, Refills: 0    Associated Diagnoses: Type 2 diabetes mellitus with diabetic nephropathy (H)      blood glucose monitoring (NO BRAND SPECIFIED) test strip Use to test blood sugars 1 times daily or as directed  Qty: 100 strip, Refills: 11    Comments: Dispense per pt insurance per pt preference  Associated Diagnoses: Type 2 diabetes mellitus with diabetic nephropathy (H)      !! order for DME Equipment being ordered: Nebulizer  Qty: 1 each, Refills: 0    Associated Diagnoses: Moderate persistent asthma with acute exacerbation      !! order for DME Equipment being ordered: BIPAP.   15/5, Rate of 12, 2L O2 to keep sats 90-95%.  Qty: 1 each, Refills: 0    Associated Diagnoses: Acute respiratory failure with hypoxia and hypercapnia (H)      !! order for DME Equipment being ordered: Nebulizer  Qty: 1 Package, Refills: 0    Associated Diagnoses: Moderate persistent asthma with acute exacerbation      !! order for DME Equipment being ordered: Hospital Bed, total electric (head, foot, and height adjustments), with any type side rails, with mattress  Qty: 1 each, Refills: 0    Associated Diagnoses: Wheelchair bound; Ankylosing spondylitis (H); Spinal stenosis in cervical region; Lumbar spinal stenosis; Fibromyalgia; Chronic low back pain      !! order for DME Equipment being ordered: Motorized  "Wheelchair  Qty: 1 each, Refills: 0    Associated Diagnoses: Wheelchair bound; Ankylosing spondylitis (H); Spinal stenosis in cervical region; Lumbar spinal stenosis; Fibromyalgia; Chronic low back pain      !! order for DME Equipment being ordered: Trapeze bar for hospital bed  Qty: 1 each, Refills: 0    Associated Diagnoses: Wheelchair bound; Ankylosing spondylitis (H); Spinal stenosis in cervical region; Lumbar spinal stenosis; Fibromyalgia; Chronic low back pain       !! - Potential duplicate medications found. Please discuss with provider.      STOP taking these medications       blood glucose monitoring (FREESTYLE FREEDOM LITE) meter device kit Comments:   Reason for Stopping:         doxycycline hyclate (VIBRA-TABS) 100 MG tablet Comments:   Reason for Stopping:         levalbuterol (XOPENEX) 1.25 MG/3ML neb solution Comments:   Reason for Stopping:         oxyCODONE HCl (OXAYDO) 5 MG TABA Comments:   Reason for Stopping:             Allergies   Allergies   Allergen Reactions     Povidone Iodine      \"mild skin irritation\" per patient report     Toradol [Ketorolac] Other (See Comments)     Pt gets nightmares     Tramadol Other (See Comments)     Data   Most Recent 3 CBC's:  Recent Labs   Lab Test 03/16/20  0825 03/15/20  0840 03/14/20  0916   WBC 15.0* 14.8* 14.6*   HGB 10.1* 9.3* 9.6*   MCV 75* 76* 76*   * 467* 414      Most Recent 3 BMP's:  Recent Labs   Lab Test 03/16/20  0825 03/15/20  0840 03/14/20  0916    133 134   POTASSIUM 3.5 3.4 3.4   CHLORIDE 95 96 97   CO2 34* 32 32   BUN 9 8 9   CR 0.60 0.64 0.65   ANIONGAP 5 5 5   DORY 10.2* 9.9 9.8   * 126* 127*     Most Recent 2 LFT's:  Recent Labs   Lab Test 03/14/20  0916 03/13/20  2122   AST 34 33   ALT 24 24   ALKPHOS 121 133   BILITOTAL 0.4 0.5     Most Recent INR's and Anticoagulation Dosing History:  Anticoagulation Dose History     Recent Dosing and Labs Latest Ref Rng & Units 8/29/2010 8/30/2010 4/18/2016 7/26/2017 12/29/2019 " 12/29/2019 1/27/2020    INR 0.86 - 1.14 1.07 1.12 1.00 0.97 Canceled, Test credited 1.61(H) 1.18(H)        Most Recent 3 Troponin's:  Recent Labs   Lab Test 01/18/20  0500 12/30/19  0610 12/29/19  2130   TROPI 0.037 12.097* 12.385*     Most Recent Cholesterol Panel:  Recent Labs   Lab Test 01/17/20  0620  05/21/19  1121   CHOL  --   --  201*   LDL  --   --  96   HDL  --   --  60   TRIG 112   < > 225*    < > = values in this interval not displayed.     Most Recent 6 Bacteria Isolates From Any Culture (See EPIC Reports for Culture Details):  Recent Labs   Lab Test 03/13/20  2213 03/13/20  2209 03/13/20 2125 03/13/20  2122 01/27/20  1035 01/27/20  1000   CULT 50,000 to 100,000 colonies/mL  Pseudomonas aeruginosa  Additional susceptibilities in progress  3/16/20  *  50,000 to 100,000 colonies/mL  Strain 2  Pseudomonas aeruginosa  *  Do not final, see observation  1  Final when sensitivities completed   No growth after 2 days No growth after 3 days Light growth  Normal bruno    Moderate growth  Eikenella corrodens  Susceptibility testing done on previous specimen  *  Light growth  Candida albicans / dubliniensis  Candida albicans and Candida dubliniensis are not routinely speciated  Susceptibility testing not routinely done  *  Light growth  Enterococcus faecalis  Susceptibility testing in progress  *  Moderate growth  Eikenella corrodens  Susceptibility testing in progress  *  Light growth  Gram positive cocci  No further identification  * Moderate growth  Streptococcus mitis group  Susceptibility testing not routinely done  *  Moderate growth  Haemophilus parainfluenzae  Susceptibility testing not routinely done  *  On day 1, isolated in broth only:  Candida glabrata complex  Susceptibility testing not routinely done  *  No anaerobes isolated Moderate growth  Streptococcus mitis group  Susceptibility testing not routinely done  *  Moderate growth  Haemophilus parainfluenzae  Susceptibility testing not  routinely done  *  Light growth  Enterococcus faecalis  *  On day 1, isolated in broth only:  Candida glabrata complex  Susceptibility testing not routinely done  *  Susceptibility testing requested by  Dr Cardenas, [phone],  would like routine sens done on C. Glabrata. 1.31.20 1007. BENITO    No anaerobes isolated     Most Recent TSH, T4 and A1c Labs:  Recent Labs   Lab Test 01/07/20  0350 10/28/19  1459  08/15/18  1224   TSH  --  0.86  --  0.28*   T4  --   --   --  1.03   A1C 7.1* 7.3*   < > 6.0*    < > = values in this interval not displayed.

## 2020-03-16 NOTE — PLAN OF CARE
DATE & TIME: 3/16/2020 3125-1046  Cognitive Concerns/ Orientation : A&O x4, calm and cooperative   BEHAVIOR & AGGRESSION TOOL COLOR: Green  ABNL VS/O2: VSS on RA  MOBILITY: Total care, turn and repo Q2H; refusing to reposition   PAIN MANAGMENT: C/o generalized pain in back and abdomen. Has scheduled Methadone  DIET: Regular  BOWEL/BLADDER: Nguyen in place, patent. Pt complaining of constipation, Miralax given x1, Senna given x1  ABNL LAB/BG:  (refused lunch BS check), WBC 15.0, Plts 513, Hgb 10.1  DRAIN/DEVICES: No IV access, nguyen  SKIN: Refused posterior skin check, abdominal wound dressings changed. Abdominal wounds draining purulent, yellow drainage.  TESTS/PROCEDURES: None  D/C DAY/GOALS/PLACE: Discharging today back to [facility] @ 1800  OTHER IMPORTANT INFO: LS diminished, BS hypoactive x4. Contact precautions maintained. Pt reports numbness and tingling in hands and feet per baseline. SW following.

## 2020-03-16 NOTE — PROGRESS NOTES
Surgery    Continued improvement. No fevers. Drainage continues to reduce.Brandon diet without issues.    Abd- Midline wound healing well. LUQ and LLQ drain sites closing with minimal drainage or erythema. RUQ site with much less drainage. No erythema    A/P  Continued improvement. Drainage significantly reduced. No significant purulent drainage. No erythema. No fevers but WBC elevated but stable. OK for discharge from my standpoint. Will likely have drainage for next week or so. She should return if increased purulent drainage, fevers, or pain.     Ayan Lopez M.D.  [facility]  [phone]

## 2020-03-17 ENCOUNTER — PATIENT OUTREACH (OUTPATIENT)
Dept: CARE COORDINATION | Facility: CLINIC | Age: 65
End: 2020-03-17

## 2020-03-17 LAB
BACTERIA SPEC CULT: ABNORMAL
Lab: ABNORMAL
SPECIMEN SOURCE: ABNORMAL

## 2020-03-17 NOTE — PROGRESS NOTES
Clinic Care Coordination Contact  Care Coordination Transition Communication    Referral Source: IP Report - patient has had 3 hospitalizations, 4 ED visits within the past 90 days, 48% risk of admission or ED visit and elevated CHIQUITA.     Clinical Data: Patient was hospitalized at [facility] from 03/13 to 03/16 with diagnosis of Spesis.     Transition to Facility:              Facility Name: [facility]              Contact name and phone number/fax: RNCC sent secure PHI fax via communication management tab. Requested notification of patients discharge and any outstanding care coordination needs.      Plan: RN/SW Care Coordinator will await notification from facility staff informing RN/SW Care Coordinator of patient's discharge plans/needs. RN/SW Care Coordinator will review chart and outreach to facility staff every 4 weeks and as needed.     Ebony Nguyen RN Care Coordinator  [facility] HospitalJerrell akins  Email: [email or web address]  Phone: [phone]

## 2020-03-17 NOTE — LETTER
[facility]   To:             Please give to facility    From:   Ebony Nguyen RN  Care Coordinator   [facility]   P: [phone]  [email or web address]   Patient Name:  Bhavani White YOB: 1955   Admit date: Readmitted 03/16/2020      *Information Needed:  Please contact me when the patient will discharge (or if they will move to long term care)- include the discharge date, disposition, and main diagnosis   - If the patient is discharged with home care services, please provide the name of the agency    Also- Please inform me if a care conference is being held.   Phone, Fax or Email with information                   Thank you

## 2020-03-19 LAB
BACTERIA SPEC CULT: NO GROWTH
SPECIMEN SOURCE: NORMAL

## 2020-03-20 LAB
BACTERIA SPEC CULT: NO GROWTH
SPECIMEN SOURCE: NORMAL

## 2020-03-30 ENCOUNTER — MEDICAL CORRESPONDENCE (OUTPATIENT)
Dept: HEALTH INFORMATION MANAGEMENT | Facility: CLINIC | Age: 65
End: 2020-03-30

## 2020-04-02 ENCOUNTER — TELEPHONE (OUTPATIENT)
Dept: FAMILY MEDICINE | Facility: CLINIC | Age: 65
End: 2020-04-02

## 2020-04-02 NOTE — TELEPHONE ENCOUNTER
Patient calling to schedule a TCU f/u. Please advise if patient can do a telephone visit, or needs a Face to Face visit ?  Ruth Behrens. .

## 2020-04-03 NOTE — TELEPHONE ENCOUNTER
RN please call and speak to patient. She has had an extensive hospital course.  Unsure if follow up can be done via phone visit.   If unable to come in can do phone visit to fill her meds. Unsure if still has wound or feeding tube?    PITO

## 2020-04-03 NOTE — TELEPHONE ENCOUNTER
We called Bhavani. She does not have a feeding tube. 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    We scheduled a telephone visit with PCP today.    Sumit Laws RN

## 2020-04-06 ENCOUNTER — VIRTUAL VISIT (OUTPATIENT)
Dept: FAMILY MEDICINE | Facility: CLINIC | Age: 65
End: 2020-04-06
Payer: MEDICARE

## 2020-04-06 DIAGNOSIS — E66.01 MORBID OBESITY, UNSPECIFIED OBESITY TYPE (H): ICD-10-CM

## 2020-04-06 DIAGNOSIS — E78.5 HYPERLIPIDEMIA LDL GOAL <100: ICD-10-CM

## 2020-04-06 DIAGNOSIS — A41.9 SEPSIS, DUE TO UNSPECIFIED ORGANISM, UNSPECIFIED WHETHER ACUTE ORGAN DYSFUNCTION PRESENT (H): Primary | ICD-10-CM

## 2020-04-06 DIAGNOSIS — L03.311 ABDOMINAL WALL CELLULITIS: ICD-10-CM

## 2020-04-06 PROCEDURE — 99214 OFFICE O/P EST MOD 30 MIN: CPT | Mod: GT | Performed by: FAMILY MEDICINE

## 2020-04-08 RX ORDER — ROSUVASTATIN CALCIUM 5 MG/1
5 TABLET, COATED ORAL AT BEDTIME
Qty: 90 TABLET | Refills: 1 | Status: SHIPPED | OUTPATIENT
Start: 2020-04-08 | End: 2020-11-05

## 2020-04-16 ENCOUNTER — MYC MEDICAL ADVICE (OUTPATIENT)
Dept: FAMILY MEDICINE | Facility: CLINIC | Age: 65
End: 2020-04-16

## 2020-04-16 DIAGNOSIS — I10 HYPERTENSION GOAL BP (BLOOD PRESSURE) < 140/90: ICD-10-CM

## 2020-04-16 RX ORDER — POTASSIUM CHLORIDE 1500 MG/1
20 TABLET, EXTENDED RELEASE ORAL DAILY
Qty: 90 TABLET | Refills: 0 | Status: SHIPPED | OUTPATIENT
Start: 2020-04-16 | End: 2020-04-22

## 2020-04-16 RX ORDER — AMLODIPINE BESYLATE 5 MG/1
5 TABLET ORAL DAILY
Qty: 90 TABLET | Refills: 0 | Status: ON HOLD | OUTPATIENT
Start: 2020-04-16 | End: 2020-06-16

## 2020-04-16 NOTE — TELEPHONE ENCOUNTER
"Patient sends Viewpoint message saying, \"There are two medications that I started on when I was hospitalized that I need to be filled. Amlodipine 5mg one tab daily and Pot Cl Micro Tab 20 MEQ ER one tab daily.\" There is no pharmacy preference listed, sent Viewpoint reply asking which pharmacy is preferred. Will await reply.  Allie Ulrich, BSN, RN, PHN    "

## 2020-04-16 NOTE — TELEPHONE ENCOUNTER
Patient responds that she would like prescription sent to [facility] on [address] in [place]. Both meds recently prescribed during hospitalization, so no appropriate to send to refill pool.  Please advise.   Allie Ulrich, JENN, RN, PHN

## 2020-04-17 ENCOUNTER — MYC MEDICAL ADVICE (OUTPATIENT)
Dept: FAMILY MEDICINE | Facility: CLINIC | Age: 65
End: 2020-04-17

## 2020-04-17 DIAGNOSIS — L03.311 ABDOMINAL WALL CELLULITIS: Primary | ICD-10-CM

## 2020-04-20 ENCOUNTER — PATIENT OUTREACH (OUTPATIENT)
Dept: CARE COORDINATION | Facility: CLINIC | Age: 65
End: 2020-04-20

## 2020-04-20 SDOH — SOCIAL STABILITY: SOCIAL NETWORK: HOW OFTEN DO YOU GET TOGETHER WITH FRIENDS OR RELATIVES?: NEVER

## 2020-04-20 SDOH — SOCIAL STABILITY: SOCIAL INSECURITY
WITHIN THE LAST YEAR, HAVE TO BEEN RAPED OR FORCED TO HAVE ANY KIND OF SEXUAL ACTIVITY BY YOUR PARTNER OR EX-PARTNER?: NO

## 2020-04-20 SDOH — HEALTH STABILITY: PHYSICAL HEALTH: ON AVERAGE, HOW MANY MINUTES DO YOU ENGAGE IN EXERCISE AT THIS LEVEL?: 20 MIN

## 2020-04-20 SDOH — ECONOMIC STABILITY: INCOME INSECURITY: IN THE LAST 12 MONTHS, WAS THERE A TIME WHEN YOU WERE NOT ABLE TO PAY THE MORTGAGE OR RENT ON TIME?: NO

## 2020-04-20 SDOH — SOCIAL STABILITY: SOCIAL INSECURITY
WITHIN THE LAST YEAR, HAVE YOU BEEN KICKED, HIT, SLAPPED, OR OTHERWISE PHYSICALLY HURT BY YOUR PARTNER OR EX-PARTNER?: NO

## 2020-04-20 SDOH — SOCIAL STABILITY: SOCIAL INSECURITY: WITHIN THE LAST YEAR, HAVE YOU BEEN AFRAID OF YOUR PARTNER OR EX-PARTNER?: NO

## 2020-04-20 SDOH — SOCIAL STABILITY: SOCIAL NETWORK: IN A TYPICAL WEEK, HOW MANY TIMES DO YOU TALK ON THE PHONE WITH FAMILY, FRIENDS, OR NEIGHBORS?: TWICE A WEEK

## 2020-04-20 SDOH — SOCIAL STABILITY: SOCIAL NETWORK: HOW OFTEN DO YOU ATTENT MEETINGS OF THE CLUB OR ORGANIZATION YOU BELONG TO?: NEVER

## 2020-04-20 SDOH — SOCIAL STABILITY: SOCIAL INSECURITY: WITHIN THE LAST YEAR, HAVE YOU BEEN HUMILIATED OR EMOTIONALLY ABUSED IN OTHER WAYS BY YOUR PARTNER OR EX-PARTNER?: NO

## 2020-04-20 SDOH — HEALTH STABILITY: PHYSICAL HEALTH: ON AVERAGE, HOW MANY DAYS PER WEEK DO YOU ENGAGE IN MODERATE TO STRENUOUS EXERCISE (LIKE A BRISK WALK)?: 7 DAYS

## 2020-04-20 SDOH — SOCIAL STABILITY: SOCIAL NETWORK: HOW OFTEN DO YOU ATTEND CHURCH OR RELIGIOUS SERVICES?: NEVER

## 2020-04-20 ASSESSMENT — ACTIVITIES OF DAILY LIVING (ADL): DEPENDENT_IADLS:: INDEPENDENT

## 2020-04-20 NOTE — PROGRESS NOTES
Clinic Care Coordination Contact  OUTREACH    Referral Information:  Referral Source: IP Report    Primary Diagnosis: SIRS/Sepsis    Chief Complaint   Patient presents with     Clinic Care Coordination - Follow-up     TCU discharge        Universal Utilization: Patient reports she was discharged from the TCU on 04/02. RNCC was not notified of discharge. Followed up with Primary Care Provider on 04/06.   Clinic Utilization  Difficulty keeping appointments:: No  Compliance Concerns: No  No-Show Concerns: No  No PCP office visit in Past Year: No  Utilization    Last refreshed: 4/17/2020  9:01 AM:  Hospital Admissions 3           Last refreshed: 4/17/2020  9:01 AM:  ED Visits 5           Last refreshed: 4/17/2020  9:01 AM:  No Show Count (past year) 1              Current as of: 4/17/2020  9:01 AM            Clinical Concerns:  Current Medical Concerns:    Patient Active Problem List   Diagnosis     Chronic low back pain     Moderate major depression (H)     Anxiety     DM type 2, w Neuropathy -- Hgb A1C 7.1 on 1/7/20     Hypothyroidism due to acquired atrophy of thyroid     Lumbar spinal stenosis     Fibromyalgia, Chronic Pain -- On Methadone     Osteoarthritis     Hypertension goal BP (blood pressure) < 140/90     Health Care Home     Neuropathy     Hyperlipidemia LDL goal <100     Recurrent UTI (urinary tract infection)     Controlled substance agreement signed 10/24/2014     Wheelchair bound     Elevated BMI     Asthma     Personal history of methicillin resistant Staphylococcus aureus     Fracture of lower extremity     Neurogenic bladder, S/P Suprapubic Catheter      Ventral hernia     Osteoarthritis of cervical spine with myelopathy     DISH (diffuse idiopathic skeletal hyperostosis)     Morbid obesity -- BMI 40.0     Perforated duodenal ulcer -- S/P Surgery 1/1/2020     NSTEMI (non-ST elevated myocardial infarction) (H)     Fusion of spine of cervical region     ANIYA -- noncompliant with CPAP     Abdominopelvic  abscess (H)     History of MRSA infection     Sepsis (WBC 20K, )     Abdominal wall cellulitis    Patient reports managing wound cares independently. Will need to contact nurse about changing suprapubic catheter, doesn't currently have home care, denies any assistance with coordination of this. Has used [employer] in the past for housekeeping assistance and will give them a call in the near future.      Current Behavioral Concerns: none noted.    Education Provided to patient: CC role   Pain  Pain (GOAL):: Yes  Type: Chronic (>3mo) - sees pain clinic, no concerns expressed.   Health Maintenance Reviewed: Due/Overdue   Health Maintenance Topics with due status: Overdue       Topic Date Due    MEDICARE ANNUAL WELLNESS VISIT 11/13/1974    ZOSTER IMMUNIZATION 11/13/2006    DIABETIC FOOT EXAM 10/27/2018    URINE DRUG SCREEN 10/27/2018    MAMMO SCREENING 05/01/2019    ASTHMA CONTROL TEST 09/11/2019    ASTHMA ACTION PLAN 03/11/2020     Health Maintenance Topics with due status: Due Soon       Topic Date Due    PHQ-9 04/28/2020   Clinical Pathway: None    Medication Management:  Patient independently manages own medications. Reports taking medications as prescribed. No concerns.  Post Discharge Medication Reconciliation Status: medication reconcilation previously completed during another office visit.     Functional Status:  Dependent ADLs:: Independent  Dependent IADLs:: Independent  Bed or wheelchair confined:: Yes  Mobility Status: Independent w/Device  Fallen 2 or more times in the past year?: No  Any fall with injury in the past year?: No    Living Situation:  Current living arrangement:: I live in a private home with spouse  Type of residence:: Apartment    Lifestyle & Psychosocial Needs:  Lifestyle     Physical activity     Days per week: 7 days     Minutes per session: 20 min     Stress: To some extent     Social Needs     Financial resource strain: Not hard at all     Food insecurity     Worry: Never true      Inability: Never true     Transportation needs     Medical: No     Non-medical: No     Diet:: Regular  Inadequate nutrition (GOAL):: No  Tube Feeding: No  Inadequate activity/exercise (GOAL):: No  Significant changes in sleep pattern (GOAL): No  Transportation means:: Regular car     Mental health management concern (GOAL):: No  Informal Support system:: Spouse   Socioeconomic History     Marital status:      Spouse name: Not on file     Number of children: Not on file     Years of education: Not on file     Highest education level: Bachelor's degree (e.g., BA, AB, BS)   Relationships     Social connections     Talks on phone: Twice a week     Gets together: Never     Attends Holiness service: Never     Active member of club or organization: No     Attends meetings of clubs or organizations: Never     Relationship status:      Intimate partner violence     Fear of current or ex partner: No     Emotionally abused: No     Physically abused: No     Forced sexual activity: No     Tobacco Use     Smoking status: Never Smoker     Smokeless tobacco: Never Used   Substance and Sexual Activity     Alcohol use: No     Frequency: Never     Drinks per session: Patient refused     Binge frequency: Patient refused     Drug use: Yes     Types: Marijuana     Comment: Medical canibis     Sexual activity: Not Currently      Resources and Interventions:  Current Resources:   Community Resources: DME  Supplies used at home:: Wound Care Supplies, Nutritional Supplements, diabetes supplies, nebulizer tubing  Equipment Currently Used at Home: wheelchair, manual, wheelchair, power, glucometer, grab bar, tub/shower, other (see comments)(Catheter supplies)  Advance Care Plan/Directive  Advanced Care Plans/Directives on file:: Yes  Type Advanced Care Plans/Directives: Advanced Directive - On File  Advanced Care Plan/Directive Status: Not Applicable    Patient/Caregiver understanding: Patient verbalized understanding and denies  any additional questions or concerns at this time. RNCC engaged in [employer] communications during encounter.        Future Appointments              In 2 days Clemencia Decker APRN CNP [facility]          Plan: No CC needs identified at this time. No further outreaches will be made at this time unless a new referral is made or a change in the pt's status occurs. Patient was provided with this writer's contact information and encouraged to call with any questions or concerns.    Ebony Nguyen RN Care Coordinator  [facility]  Email: [email or web address]  Phone: [phone]

## 2020-04-21 NOTE — PROGRESS NOTES
"TELEPHONE VISIT    Bhavani White is a 64 year old female who is being evaluated via a billable telephone visit.      The patient has been notified of following:     \"This telephone visit will be conducted via a call between you and your physician/provider. Given concern for spread of COVID 19 we are minimizing in person clinic visits when possible. We have found that certain health care needs can be provided without the need for a physical exam.  This service lets us provide the care you need with a short phone conversation.  If a prescription is necessary we can send it directly to your pharmacy.  If lab work is needed we can place an order for that and you can then stop by our lab to have the test done at a later time. If during the course of the call the physician/provider feels a telephone visit is not appropriate, you will not be charged for this service.\"       I have reviewed and updated the patient's Past Medical History, Social History, Family History and Medication List.    MEDICATIONS:  Current Outpatient Medications   Medication Sig Dispense Refill     acetaminophen (TYLENOL) 325 MG tablet Take 650 mg by mouth every 4 hours as needed for mild pain       amLODIPine (NORVASC) 5 MG tablet 1 tablet (5 mg) by Oral or Feeding Tube route daily 90 tablet 0     bisacodyl (DULCOLAX) 10 MG suppository Place 10 mg rectally 2 times daily as needed for constipation       blood glucose (NO BRAND SPECIFIED) lancets standard Use to test blood sugar 1 times daily or as directed. 100 each 11     blood glucose monitoring (NO BRAND SPECIFIED) meter device kit Dispense freestyle zak glucose meter 1 kit 0     blood glucose monitoring (NO BRAND SPECIFIED) test strip Use to test blood sugars 1 times daily or as directed 100 strip 11     collagenase (SANTYL) 250 UNIT/GM external ointment Apply topically daily 90 g 0     collagenase 250 UNIT/GM EX external ointment Apply topically daily Apply to rt abdomen wound every day shift "       docusate sodium (COLACE) 100 MG capsule Take 2 capsules (200 mg) by mouth 2 times daily  0     DULoxetine (CYMBALTA) 60 MG capsule Take 120 mg by mouth every morning       furosemide (LASIX) 40 MG tablet Take 1 tablet (40 mg) by mouth daily  0     gabapentin (NEURONTIN) 800 MG tablet Take 1,600 mg by mouth 3 times daily 0800, 1200 and 1800       hypromellose-dextran (ARTIFICAL TEARS) 0.1-0.3 % ophthalmic solution Place 1 drop into both eyes 3 times daily as needed for dry eyes  0     insulin aspart (NOVOLOG PEN) 100 UNIT/ML pen 3 times a day with meals, for glucometer 150-200 give 2 units SQ, 201-250 give 4 units, >250 give 6 units  0     ipratropium - albuterol 0.5 mg/2.5 mg/3 mL (DUONEB) 0.5-2.5 (3) MG/3ML neb solution Take 1 vial by nebulization every 6 hours as needed for shortness of breath / dyspnea or wheezing       levothyroxine (SYNTHROID/LEVOTHROID) 88 MCG tablet TAKE 1 TABLET(88 MCG) BY MOUTH DAILY 90 tablet 0     metFORMIN (GLUCOPHAGE) 500 MG tablet Take 500 mg by mouth 2 times daily (with meals)       methadone (DOLOPHINE) 10 MG tablet Take 2 tablets (20 mg) by mouth 2 times daily 20 tablet 0     miconazole (MICATIN/MICRO GUARD) 2 % external powder Apply topically 2 times daily as needed for other (topical candidiasis)  0     ondansetron (ZOFRAN-ODT) 4 MG ODT tab Take 1 tablet (4 mg) by mouth every 6 hours as needed for nausea or vomiting       order for DME Equipment being ordered: Nebulizer 1 each 0     order for DME Equipment being ordered: BIPAP.   15/5, Rate of 12, 2L O2 to keep sats 90-95%. 1 each 0     order for DME Equipment being ordered: Nebulizer 1 Package 0     order for DME Equipment being ordered: Hospital Bed, total electric (head, foot, and height adjustments), with any type side rails, with mattress 1 each 0     order for DME Equipment being ordered: Motorized Wheelchair 1 each 0     order for DME Equipment being ordered: Trapeze bar for hospital bed 1 each 0     OXYBUTYNIN  CHLORIDE PO Take by mouth 2 times daily       oxyCODONE (ROXICODONE) 5 MG tablet Take 1 tablet (5 mg) by mouth every 4 hours as needed for severe pain 10 tablet 0     pantoprazole sodium (PROTONIX) 40 MG packet Take 1 packet by mouth 2 times daily       polyethylene glycol (MIRALAX/GLYCOLAX) packet Take 17 g by mouth 2 times daily as needed (constipation)  0     potassium chloride ER (KLOR-CON M) 20 MEQ CR tablet Take 1 tablet (20 mEq) by mouth daily 90 tablet 0     rosuvastatin (CRESTOR) 5 MG tablet Take 1 tablet (5 mg) by mouth At Bedtime 90 tablet 1     senna-docusate (SENOKOT-S;PERICOLACE) 8.6-50 MG per tablet Take 2 tablets by mouth 2 times daily (Patient taking differently: Take 2 tablets by mouth daily ) 100 tablet      traZODone (DESYREL) 100 MG tablet Take 100 mg by mouth At Bedtime       VENTOLIN  (90 Base) MCG/ACT inhaler INHALE 2 PUFFS INTO THE LUNGS EVERY 6 HOURS AS NEEDED FOR SHORTNESS OF BREATH 18 g 0       ALLERGIES  Povidone iodine; Toradol [ketorolac]; and Tramadol    Review Of Systems  Skin: negative  Eyes: negative  Ears/Nose/Throat: negative  Respiratory: No shortness of breath, dyspnea on exertion, cough, or hemoptysis, No shortness of breath and No dyspnea on exertion sleep apnea  Cardiovascular: negative  Gastrointestinal: negative  Genitourinary: negative  Musculoskeletal: negative  Neurologic: negative  Psychiatric: negative  Hematologic/Lymphatic/Immunologic: negative  Endocrine: thyroid disorder and diabetes  (ROS TAKEN BY Adelaida Deleon CMA   PRIOR TO VISIT)    Patient reported vitals:  BP: 132/64  Heart rate: n/a  Weight: 222lb        Patient agreeable and consented for telephone visit:  Yes    HISTORY OF PRESENT ILLNESS  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  She is a patient of Dr. Celaya seen in our clinic for stress-induced CM, HTN, hyperlipidemia, DM, morbid obesity, untreated ANIYA, neurogenic bladder with suprapubic catheter,  "asthma, and chronic pain syndrome on methadone.    Bhavani was admitted to [facility] (12/29/2019) for AMS and found to have a perforated gastric ulcer, septic shock, and respiratory failure. She was quite sick and required mechanical ventilation.   Her troponin elevated to 19.4 prompting cardiology consultation. There were no new concerning EKG changes.  An ECHO showed LVEF 30% with tako-tsubo like RWMA, moderate RV dysfunction, and no significant valvular pathology.  She underwent exploratory laparoscopy revealing perforation of duodenum with ischemia and GI bleed.  She had emergent lysis of adhesions and placement of a Malecot drain and wound VAC (12/30/19).     She had a repeat ECHO (1/14/20) when her status improved.  This showed LVEF 60%, probably normal RV function and limited visualization of wall motion. Antiplatelet agents were to be avoided for 2 months and it was felt that she suffered a type II MI from septic shock.  Her post-operative period was complicated by acute hypoxic respiratory failure due to flash pulmonary edema and aspiration pneumonia. She was started on Crestor and Amlodipine.  An outpt cMRI was recommended.     Since then, Bhavani has had multiple re-hospitalizations for ongoing abdominal abscesses and sepsis.(last 3/16/20)  A ECHO was repeated (3/14/20) showing LVEF 60%, grade II LVD, moderate RV dysfunction/RV enlargement (similar) She did not have any apparent CV complications.     Bhavani has been home now for 2 1/2 weeks  She self-stopped her lasix then due to her feeling \"so dry\"  She denies any fluid retention of abdominal bloating.  She does not weigh herself as she is wheelchair bound.  We talked about avoiding salty foods.  She does not use CPAP and not willing to.  She has a hospital bed and chronically sleeps with her head elevated.  Bhavani denies any CP, SOB, palpitations, orthopnea or peripheral edema.  She tells me that her incisions and drain sites are healing well.  Her appetite is " slowly coming back after being on prolonged tube feedings.  Her weakness is slowly improving.           ASSESSMENT AND PLAN    Resolved Stress-Induced CM / RV Dysfunction  -at time of septic shock (12/29/19) with associated type II MI  -resolved  LVEF 60% with moderate RV dysfunction / RV enlargement (3/14/20)  -untreated ANIYA &/ morbid obesity contributing to her right HF  -self-stopped Lasix 2 weeks ago.  Has not noted any fluid retention and unable to assess her weight.  -last weight was down 16 lbs from hospital  -discussed low salt diet / weight loss  -denies symptoms of HF  -will arrange outpt cMRI as previously recommended due to NSTEMI    S/p Perforated duodenal ulcer and emergent lysis and drain placement (12/30/19)  -prolonged antibiotics due to recurrent abdominal infections/abscesses  -drains now have been removed and antibiotics completed  -no ASA      HTN:  -On Amlodipine 5mg    -BP controlled at home    Untreated ANIYA  -refuses CPAP    Hyperlipidemia:  -Crestor 5mg started (1/2020)  -due for lipid panel / ALT        Thank you for allowing me to participate in her care.  We will arrange a cMRI and do a lipid panel and BMP at that time.  We will arrange another follow up visit          I have reviewed the note as documented above.  This accurately captures the substance of my conversation with the patient.      Phone call contact time  Call Started at 15:13 pm  Call Ended at 15:32 pm    ELISSA Friedman, CNP

## 2020-04-22 ENCOUNTER — VIRTUAL VISIT (OUTPATIENT)
Dept: CARDIOLOGY | Facility: CLINIC | Age: 65
End: 2020-04-22
Attending: INTERNAL MEDICINE
Payer: MEDICARE

## 2020-04-22 DIAGNOSIS — I51.81 STRESS-INDUCED CARDIOMYOPATHY: ICD-10-CM

## 2020-04-22 DIAGNOSIS — I50.810 RIGHT-SIDED HEART FAILURE, UNSPECIFIED HF CHRONICITY (H): Primary | ICD-10-CM

## 2020-04-22 DIAGNOSIS — I21.4 NSTEMI (NON-ST ELEVATED MYOCARDIAL INFARCTION) (H): ICD-10-CM

## 2020-04-22 PROCEDURE — 99442 ZZC PHYSICIAN TELEPHONE EVALUATION 11-20 MIN: CPT | Performed by: NURSE PRACTITIONER

## 2020-04-22 NOTE — LETTER
4/22/2020      RE: Bhavani White  [address]  [place] [id number]       Dear Colleague,    Thank you for the opportunity to participate in the care of your patient, Bhavani White, at the [facility]. Please see a copy of my visit note below.    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  She is a patient of Dr. Celaya seen in our clinic for stress-induced CM, HTN, hyperlipidemia, DM, morbid obesity, untreated ANIYA, neurogenic bladder with suprapubic catheter, asthma, and chronic pain syndrome on methadone.    Bhavani was admitted to [facility] (12/29/2019) for AMS and found to have a perforated gastric ulcer, septic shock, and respiratory failure. She was quite sick and required mechanical ventilation.   Her troponin elevated to 19.4 prompting cardiology consultation. There were no new concerning EKG changes.  An ECHO showed LVEF 30% with tako-tsubo like RWMA, moderate RV dysfunction, and no significant valvular pathology.  She underwent exploratory laparoscopy revealing perforation of duodenum with ischemia and GI bleed.  She had emergent lysis of adhesions and placement of a Malecot drain and wound VAC (12/30/19).     She had a repeat ECHO (1/14/20) when her status improved.  This showed LVEF 60%, probably normal RV function and limited visualization of wall motion. Antiplatelet agents were to be avoided for 2 months and it was felt that she suffered a type II MI from septic shock.  Her post-operative period was complicated by acute hypoxic respiratory failure due to flash pulmonary edema and aspiration pneumonia. She was started on Crestor and Amlodipine.  An outpt cMRI was recommended.     Since then, Bhavani has had multiple re-hospitalizations for ongoing abdominal abscesses and sepsis.(last 3/16/20)  A ECHO was repeated (3/14/20) showing  "LVEF 60%, grade II LVD, moderate RV dysfunction/RV enlargement (similar) She did not have any apparent CV complications.     Bhavani has been home now for 2 1/2 weeks  She self-stopped her lasix then due to her feeling \"so dry\"  She denies any fluid retention of abdominal bloating.  She does not weigh herself as she is wheelchair bound.  We talked about avoiding salty foods.  She does not use CPAP and not willing to.  She has a hospital bed and chronically sleeps with her head elevated.  Bhavani denies any CP, SOB, palpitations, orthopnea or peripheral edema.  She tells me that her incisions and drain sites are healing well.  Her appetite is slowly coming back after being on prolonged tube feedings.  Her weakness is slowly improving.       ASSESSMENT AND PLAN    Resolved Stress-Induced CM / RV Dysfunction  -at time of septic shock (12/29/19) with associated type II MI  -resolved  LVEF 60% with moderate RV dysfunction / RV enlargement (3/14/20)  -untreated ANIYA &/ morbid obesity contributing to her right HF  -self-stopped Lasix 2 weeks ago.  Has not noted any fluid retention and unable to assess her weight.  -last weight was down 16 lbs from hospital  -discussed low salt diet / weight loss  -denies symptoms of HF  -will arrange outpt cMRI as previously recommended due to NSTEMI    S/p Perforated duodenal ulcer and emergent lysis and drain placement (12/30/19)  -prolonged antibiotics due to recurrent abdominal infections/abscesses  -drains now have been removed and antibiotics completed  -no ASA      HTN:  -On Amlodipine 5mg    -BP controlled at home    Untreated ANIYA  -refuses CPAP    Hyperlipidemia:  -Crestor 5mg started (1/2020)  -due for lipid panel / ALT      Thank you for allowing me to participate in her care.  We will arrange a cMRI and do a lipid panel and BMP at that time.  We will arrange another follow up visit      I have reviewed the note as documented above.  This accurately captures the substance of my " conversation with the patient.    Phone call contact time  Call Started at 15:13 pm  Call Ended at 15:32 pm    Please do not hesitate to contact me if you have any questions/concerns.     Sincerely,     ELISSA Spears CNP

## 2020-04-24 ENCOUNTER — MYC MEDICAL ADVICE (OUTPATIENT)
Dept: FAMILY MEDICINE | Facility: CLINIC | Age: 65
End: 2020-04-24

## 2020-04-24 DIAGNOSIS — K21.9 GASTROESOPHAGEAL REFLUX DISEASE WITHOUT ESOPHAGITIS: Primary | ICD-10-CM

## 2020-04-24 NOTE — TELEPHONE ENCOUNTER
Please forward to PCP on Monday. Pantoprazole 40 mg once daily is not on active med list.  Dr. Vee, video visit for follow up?   Sumit Laws RN

## 2020-04-28 ENCOUNTER — MYC MEDICAL ADVICE (OUTPATIENT)
Dept: FAMILY MEDICINE | Facility: CLINIC | Age: 65
End: 2020-04-28

## 2020-04-28 RX ORDER — PANTOPRAZOLE SODIUM 40 MG/1
40 TABLET, DELAYED RELEASE ORAL DAILY
Qty: 90 TABLET | Refills: 1 | Status: SHIPPED | OUTPATIENT
Start: 2020-04-28 | End: 2020-10-22

## 2020-05-01 DIAGNOSIS — I21.4 NSTEMI (NON-ST ELEVATED MYOCARDIAL INFARCTION) (H): ICD-10-CM

## 2020-05-01 LAB
ALT SERPL W P-5'-P-CCNC: 19 U/L (ref 0–50)
ANION GAP SERPL CALCULATED.3IONS-SCNC: 3 MMOL/L (ref 3–14)
BUN SERPL-MCNC: 14 MG/DL (ref 7–30)
CALCIUM SERPL-MCNC: 10.1 MG/DL (ref 8.5–10.1)
CHLORIDE SERPL-SCNC: 101 MMOL/L (ref 94–109)
CHOLEST SERPL-MCNC: 113 MG/DL
CO2 SERPL-SCNC: 32 MMOL/L (ref 20–32)
CREAT SERPL-MCNC: 0.65 MG/DL (ref 0.52–1.04)
GFR SERPL CREATININE-BSD FRML MDRD: >90 ML/MIN/{1.73_M2}
GLUCOSE SERPL-MCNC: 110 MG/DL (ref 70–99)
HDLC SERPL-MCNC: 43 MG/DL
LDLC SERPL CALC-MCNC: 44 MG/DL
NONHDLC SERPL-MCNC: 70 MG/DL
POTASSIUM SERPL-SCNC: 4.1 MMOL/L (ref 3.4–5.3)
SODIUM SERPL-SCNC: 136 MMOL/L (ref 133–144)
TRIGL SERPL-MCNC: 130 MG/DL

## 2020-05-01 PROCEDURE — 80048 BASIC METABOLIC PNL TOTAL CA: CPT | Performed by: NURSE PRACTITIONER

## 2020-05-01 PROCEDURE — 80061 LIPID PANEL: CPT | Performed by: NURSE PRACTITIONER

## 2020-05-01 PROCEDURE — 84460 ALANINE AMINO (ALT) (SGPT): CPT | Performed by: NURSE PRACTITIONER

## 2020-05-01 PROCEDURE — 36415 COLL VENOUS BLD VENIPUNCTURE: CPT | Performed by: NURSE PRACTITIONER

## 2020-05-04 ENCOUNTER — TELEPHONE (OUTPATIENT)
Dept: CARDIOLOGY | Facility: CLINIC | Age: 65
End: 2020-05-04

## 2020-05-04 NOTE — TELEPHONE ENCOUNTER
PATIENT WELLNESS TELEPHONE SCREENING     Step 1: Answer all screening questions and document in Epic.     1. In the past 3 weeks, have you been exposed to someone with a known positive illness below?  COVID-19: No  Chickenpox: No  Measles: No  Pertussis: No    2. Do you have any of the following new symptoms or symptoms that have started within the past 14 days?   Fever or reported chills: No   A new cough: No   Shortness of breath: No   Nausea, vomiting or diarrhea: No    Step 2: If the patient is positive for symptoms, consult the ordering provider/consult IP to determine if the procedure is deemed necessary and inform the patient you will call them back.     Step 3 . If no symptoms, patient informed of the no visitor policy in place. Yes    Step 4. If positive new symptoms, and the procedure is deemed necessary. Notify your manager/supervisor. The patient must be informed to call the procedural department.  A team member with the appropriate PPE will bring the mask to the patient at door 2. The patient will be brought to the procedural department and registered over the phone.

## 2020-05-05 ENCOUNTER — HOSPITAL ENCOUNTER (OUTPATIENT)
Dept: CARDIOLOGY | Facility: CLINIC | Age: 65
Discharge: HOME | End: 2020-05-05
Attending: NURSE PRACTITIONER | Admitting: NURSE PRACTITIONER
Payer: MEDICARE

## 2020-05-05 VITALS — SYSTOLIC BLOOD PRESSURE: 115 MMHG | DIASTOLIC BLOOD PRESSURE: 52 MMHG

## 2020-05-05 DIAGNOSIS — I21.4 NSTEMI (NON-ST ELEVATED MYOCARDIAL INFARCTION) (H): ICD-10-CM

## 2020-05-05 DIAGNOSIS — N31.9 NEUROGENIC BLADDER: Primary | ICD-10-CM

## 2020-05-05 PROCEDURE — 75563 CARD MRI W/STRESS IMG & DYE: CPT | Mod: 26 | Performed by: INTERNAL MEDICINE

## 2020-05-05 PROCEDURE — 25000128 H RX IP 250 OP 636: Performed by: NURSE PRACTITIONER

## 2020-05-05 PROCEDURE — A9585 GADOBUTROL INJECTION: HCPCS | Performed by: NURSE PRACTITIONER

## 2020-05-05 PROCEDURE — 93018 CV STRESS TEST I&R ONLY: CPT | Performed by: INTERNAL MEDICINE

## 2020-05-05 PROCEDURE — 93017 CV STRESS TEST TRACING ONLY: CPT

## 2020-05-05 PROCEDURE — 93016 CV STRESS TEST SUPVJ ONLY: CPT | Performed by: INTERNAL MEDICINE

## 2020-05-05 PROCEDURE — 25500064 ZZH RX 255 OP 636: Performed by: NURSE PRACTITIONER

## 2020-05-05 RX ORDER — DIPHENHYDRAMINE HCL 25 MG
25 CAPSULE ORAL
Status: DISCONTINUED | OUTPATIENT
Start: 2020-05-05 | End: 2020-05-06 | Stop reason: HOSPADM

## 2020-05-05 RX ORDER — METHYLPREDNISOLONE SODIUM SUCCINATE 125 MG/2ML
125 INJECTION, POWDER, LYOPHILIZED, FOR SOLUTION INTRAMUSCULAR; INTRAVENOUS
Status: DISCONTINUED | OUTPATIENT
Start: 2020-05-05 | End: 2020-05-06 | Stop reason: HOSPADM

## 2020-05-05 RX ORDER — REGADENOSON 0.08 MG/ML
0.4 INJECTION, SOLUTION INTRAVENOUS ONCE
Status: COMPLETED | OUTPATIENT
Start: 2020-05-05 | End: 2020-05-05

## 2020-05-05 RX ORDER — DIPHENHYDRAMINE HYDROCHLORIDE 50 MG/ML
25-50 INJECTION INTRAMUSCULAR; INTRAVENOUS
Status: DISCONTINUED | OUTPATIENT
Start: 2020-05-05 | End: 2020-05-06 | Stop reason: HOSPADM

## 2020-05-05 RX ORDER — REGADENOSON 0.08 MG/ML
0.4 INJECTION, SOLUTION INTRAVENOUS ONCE
Status: DISCONTINUED | OUTPATIENT
Start: 2020-05-05 | End: 2020-05-06 | Stop reason: HOSPADM

## 2020-05-05 RX ORDER — ACYCLOVIR 200 MG/1
0-1 CAPSULE ORAL
Status: DISCONTINUED | OUTPATIENT
Start: 2020-05-05 | End: 2020-05-06 | Stop reason: HOSPADM

## 2020-05-05 RX ORDER — ALBUTEROL SULFATE 90 UG/1
2 AEROSOL, METERED RESPIRATORY (INHALATION) EVERY 5 MIN PRN
Status: DISCONTINUED | OUTPATIENT
Start: 2020-05-05 | End: 2020-05-06 | Stop reason: HOSPADM

## 2020-05-05 RX ORDER — DIAZEPAM 5 MG
5 TABLET ORAL EVERY 30 MIN PRN
Status: DISCONTINUED | OUTPATIENT
Start: 2020-05-05 | End: 2020-05-06 | Stop reason: HOSPADM

## 2020-05-05 RX ORDER — AMINOPHYLLINE 25 MG/ML
50-100 INJECTION, SOLUTION INTRAVENOUS
Status: DISCONTINUED | OUTPATIENT
Start: 2020-05-05 | End: 2020-05-06 | Stop reason: HOSPADM

## 2020-05-05 RX ORDER — GADOBUTROL 604.72 MG/ML
5-65 INJECTION INTRAVENOUS ONCE
Status: COMPLETED | OUTPATIENT
Start: 2020-05-05 | End: 2020-05-05

## 2020-05-05 RX ORDER — AMINOPHYLLINE 25 MG/ML
100 INJECTION, SOLUTION INTRAVENOUS ONCE
Status: DISCONTINUED | OUTPATIENT
Start: 2020-05-05 | End: 2020-05-06 | Stop reason: HOSPADM

## 2020-05-05 RX ORDER — ONDANSETRON 2 MG/ML
4 INJECTION INTRAMUSCULAR; INTRAVENOUS
Status: DISCONTINUED | OUTPATIENT
Start: 2020-05-05 | End: 2020-05-06 | Stop reason: HOSPADM

## 2020-05-05 RX ADMIN — GADOBUTROL 28 ML: 604.72 INJECTION INTRAVENOUS at 17:36

## 2020-05-05 RX ADMIN — REGADENOSON 0.4 MG: 0.08 INJECTION, SOLUTION INTRAVENOUS at 16:46

## 2020-05-06 ENCOUNTER — TELEPHONE (OUTPATIENT)
Dept: CARDIOLOGY | Facility: CLINIC | Age: 65
End: 2020-05-06

## 2020-05-06 ENCOUNTER — TELEPHONE (OUTPATIENT)
Dept: UROLOGY | Facility: CLINIC | Age: 65
End: 2020-05-06

## 2020-05-06 NOTE — TELEPHONE ENCOUNTER
cMRI reviewed  Improved bilateral pleural effusions from chest CT in March. However, she stopped her lasix and she needs to be observant for worsening SOB.  LVEF 67% and normal RV function  There is a finding of a small area of d RCA infarction.  Will further review with pt at time of visit (5/26/20)  Thanks, SHAWN

## 2020-05-06 NOTE — TELEPHONE ENCOUNTER
[facility] Call Center    Phone Message    May a detailed message be left on voicemail: yes     Reason for Call: Tiny has questions:   1) Was the visit with the provider a virtual visit or an in clinic visit?  2) Medicare required: The  Home Care team needs a face to face visit which has to be 90 days before or 30 days after pt's first visit, depending on the visit with the provider.  3) Is Dr. Rios going to sign the entire certification or who will be in charge of the whole package?  Please call Tiny. Thank you.      Action Taken: Message routed to:  [facility] ([facility]  Urology    Travel Screening: Not Applicable

## 2020-05-06 NOTE — TELEPHONE ENCOUNTER
Message left on Tiny from [facility] voicemail stating that Dr. Yoandy Rios will only address the Urology needs (per Jaki Dimas RNCC for Dr. Yoandy Rios). Patient will have to reach out to her PCP to sign certification  orders.     Papa Hollis MA

## 2020-05-06 NOTE — TELEPHONE ENCOUNTER
Rashad Salinas,     Dr. Rios would only address the urology needs so if any other meds or orders not related to urology needs to go to her PCP yes you are correct.     Thanks,   Jaki

## 2020-05-06 NOTE — TELEPHONE ENCOUNTER
Tiny Olmos from [facility] states that the patient needs a virtual or in clinic appointment to provide home care orders to exchange the patient's SP tube catheter. She states that Dr. Yoandy Rios will have to address all the patient's medications and needs as well. She states that 11/30/2019 is within the 90 days. She states that he would have to address all her other home order request as well. Should we have the patient reach out to her PCP? Schedule appointment? Would he after to address all her medications and other needs?  Please advise    Thanks, Papa Hollis MA

## 2020-05-06 NOTE — TELEPHONE ENCOUNTER
Called patient. LVM. Awaiting call back.     Mercedes RN, BSN  [facility]

## 2020-05-07 NOTE — TELEPHONE ENCOUNTER
Health Call Center    Phone Message    May a detailed message be left on voicemail: no     Reason for Call: Other: Tiny from [facility] called to clarify the voicemail left by Brad. She needs to know if the PCP is going to sign for the whole package (orders for the catheter, and all other needs) or if Dr. Rios will sign for the urology part and PCP will do the rest. Please call her back.     Action Taken: Message routed to:  [facility] UROLOGY ADULT [facility]    Travel Screening: Not Applicable

## 2020-05-08 NOTE — TELEPHONE ENCOUNTER
Patient was notified that this has to be a virtual visit or in clinic visit per the patient's Medicare insurance (per Tiny from [facility]). Patient agreed with the plan. Patient will download AW Touchpoint prior to her visit. Patient states that her last catheter change was last month by her nursing home.      Papa Hollis MA

## 2020-05-08 NOTE — TELEPHONE ENCOUNTER
Message left for patient to call to schedule a virtual or in clinic appointment with Dr. Yoandy Rios or his fellow Dr. Andino Holley to discuss home care placing home care orders for [facility] can go out to change her SP tube catheter. Message also states for the patient to call and let us know when was her last change and who exchanged her SP tube catheter.      Papa Hollis MA

## 2020-05-08 NOTE — TELEPHONE ENCOUNTER
Message left on the patient's voicemail stating that she needs to schedule a virtual or in clinic follow up with Dr. Yoandy Rios or his fellow Dr. Andino Holley to renew home care orders for her SP tube cathter changes. (per Tiny from [facility]).     Papa Hollis MA

## 2020-05-08 NOTE — TELEPHONE ENCOUNTER
Tiny from [facility] was notified that Dr. Yoandy Rios will only address the patient's Urology needs for the SP tube cathter changes. Tiny was notified that we will reach out to the patient to schedule a in clinic follow up or virtual follow up with Dr. Yoandy Rios or his fellow Dr. Thu Babb.    Papa Hollis MA

## 2020-05-08 NOTE — TELEPHONE ENCOUNTER
[facility] Call Center    Phone Message    May a detailed message be left on voicemail: yes     Reason for Call: Patient returned call to clinic regarding scheduling appointment. Pt declined both video and in-person and requested a telephone visit be scheduled with Dr. Rios. Pt is scheduled on 5/18/2020 for Telephone Visit.     NOTE: Pt didn't feel comfortable with doing a video visit and does not want to come in to clinic for in-person due to COVID-19. Please call patient back if this will not work for her visit.     Also, I asked the patient where she last had catheter changed, and she said it was over a month ago while she was in a nursing home, the [facility].     Action Taken: Message routed to:  [facility] Urology    Travel Screening: Not Applicable

## 2020-05-11 ENCOUNTER — PRE VISIT (OUTPATIENT)
Dept: UROLOGY | Facility: CLINIC | Age: 65
End: 2020-05-11

## 2020-05-11 NOTE — TELEPHONE ENCOUNTER
Reason for visit: Neurogenic bladder follow up     Relevant information: Kash, SPT    Records/imaging/labs/orders: records available    Pt called: send updated Rx for catheter with chart notes to [facility]

## 2020-05-13 NOTE — TELEPHONE ENCOUNTER
Spoke with patient, reviewed results. Patient had no questions.     Since stopping Lasix, patient has noticed lower extremity edema, worse in the evening. Patient denies SOB. Patient does not walk, and is in a wheelchair, patient sits most of the day. Unable to do daily weights.     Patient was advised to elevate her legs when she can and to call w/SOB or w/worsening SOB. Call back number provided.      Patient was advised to f/u as planned.     Mercedes RN, BSN  [facility]

## 2020-05-18 ENCOUNTER — VIRTUAL VISIT (OUTPATIENT)
Dept: UROLOGY | Facility: CLINIC | Age: 65
End: 2020-05-18
Payer: MEDICARE

## 2020-05-18 DIAGNOSIS — N31.9 NEUROGENIC BLADDER: Primary | ICD-10-CM

## 2020-05-18 RX ORDER — POTASSIUM CHLORIDE 1500 MG/1
TABLET, EXTENDED RELEASE ORAL
Status: ON HOLD | COMMUNITY
Start: 2020-04-16 | End: 2020-06-03

## 2020-05-18 RX ORDER — OXYBUTYNIN CHLORIDE 15 MG/1
15 TABLET, EXTENDED RELEASE ORAL DAILY
COMMUNITY
Start: 2019-11-29 | End: 2020-06-30

## 2020-05-18 RX ORDER — METFORMIN HCL 500 MG
500 TABLET, EXTENDED RELEASE 24 HR ORAL 2 TIMES DAILY WITH MEALS
COMMUNITY
Start: 2019-11-08 | End: 2021-05-04

## 2020-05-18 RX ORDER — LISINOPRIL 5 MG/1
5 TABLET ORAL DAILY
Status: ON HOLD | COMMUNITY
Start: 2020-04-27 | End: 2020-06-16

## 2020-05-18 RX ORDER — HYDROXYZINE HYDROCHLORIDE 50 MG/1
50 TABLET, FILM COATED ORAL 2 TIMES DAILY PRN
COMMUNITY
Start: 2019-12-20 | End: 2023-01-01

## 2020-05-18 ASSESSMENT — PAIN SCALES - GENERAL: PAINLEVEL: NO PAIN (0)

## 2020-05-18 NOTE — PATIENT INSTRUCTIONS
Please follow up with Dr. Rios as needed. Please contact our clinic if you need to set up SPT changes in clinic.     It was a pleasure meeting with you today.  Thank you for allowing me and my team the privilege of caring for you today.  YOU are the reason we are here, and I truly hope we provided you with the excellent service you deserve.  Please let us know if there is anything else we can do for you so that we can be sure you are leaving completely satisfied with your care experience.        Enoc Aleman, EMT

## 2020-05-18 NOTE — NURSING NOTE
Chief Complaint   Patient presents with     RECHECK     Neurogenic bladder follow up     Anay Mejia, CMA

## 2020-05-19 ENCOUNTER — TELEPHONE (OUTPATIENT)
Dept: UROLOGY | Facility: CLINIC | Age: 65
End: 2020-05-19

## 2020-05-19 DIAGNOSIS — N31.9 NEUROGENIC BLADDER: Primary | ICD-10-CM

## 2020-05-19 NOTE — TELEPHONE ENCOUNTER
[facility] Call Center    Phone Message    May a detailed message be left on voicemail: yes     Reason for Call: Other: Radha from  Home Care states she is returning a call to someone that reached out to her.  She is waiting from orders from the provider. [phone]     Action Taken: Message routed to:  [facility] uro    Travel Screening: Not Applicable

## 2020-05-19 NOTE — TELEPHONE ENCOUNTER
[facility] Call Center    Phone Message    May a detailed message be left on voicemail: yes     Reason for Call: Order(s): Home Care Orders: Skilled Nursing: need to refresh orders for face to face, catheter change with detailed skilled hours and time frames.  Previous order     Action Taken: Message routed to:  [facility] urology    Travel Screening: Not Applicable

## 2020-05-22 ENCOUNTER — TELEPHONE (OUTPATIENT)
Dept: UROLOGY | Facility: CLINIC | Age: 65
End: 2020-05-22

## 2020-05-22 NOTE — TELEPHONE ENCOUNTER
[facility] Call Center    Phone Message    May a detailed message be left on voicemail: yes     Reason for Call: Order(s): Other:   Reason for requested: Catheter change  Date needed: Soon  Provider name: Dr. Gabriel Rider called and needs a verbal order. Please call her back. Thanks    Action Taken: Message routed to:  [facility] urology clinic    Travel Screening: Not Applicable

## 2020-05-25 NOTE — PROGRESS NOTES
"TELEPHONE VISIT    Bhavani White is a 64 year old female who is being evaluated via a billable telephone visit.      The patient has been notified of following:     \"This telephone visit will be conducted via a call between you and your physician/provider. Given concern for spread of COVID 19 we are minimizing in person clinic visits when possible. We have found that certain health care needs can be provided without the need for a physical exam.  This service lets us provide the care you need with a short phone conversation.  If a prescription is necessary we can send it directly to your pharmacy.  If lab work is needed we can place an order for that and you can then stop by our lab to have the test done at a later time. If during the course of the call the physician/provider feels a telephone visit is not appropriate, you will not be charged for this service.\"       I have reviewed and updated the patient's Past Medical History, Social History, Family History and Medication List.    MEDICATIONS:  Current Outpatient Medications   Medication Sig Dispense Refill     amLODIPine (NORVASC) 5 MG tablet 1 tablet (5 mg) by Oral or Feeding Tube route daily 90 tablet 0     bisacodyl (DULCOLAX) 10 MG suppository Place 10 mg rectally 2 times daily as needed for constipation       docusate sodium (COLACE) 100 MG capsule Take 2 capsules (200 mg) by mouth 2 times daily  0     DULoxetine (CYMBALTA) 60 MG capsule Take 120 mg by mouth every morning       gabapentin (NEURONTIN) 800 MG tablet Take 1,600 mg by mouth 3 times daily 0800, 1200 and 1800       hydrOXYzine (ATARAX) 50 MG tablet TK 1 T PO BID PRA OR INSOMNIA       hypromellose-dextran (ARTIFICAL TEARS) 0.1-0.3 % ophthalmic solution Place 1 drop into both eyes 3 times daily as needed for dry eyes  0     ipratropium - albuterol 0.5 mg/2.5 mg/3 mL (DUONEB) 0.5-2.5 (3) MG/3ML neb solution Take 1 vial by nebulization every 6 hours as needed for shortness of breath / dyspnea or " wheezing       levothyroxine (SYNTHROID/LEVOTHROID) 88 MCG tablet TAKE 1 TABLET(88 MCG) BY MOUTH DAILY 90 tablet 0     lisinopril (ZESTRIL) 5 MG tablet        metFORMIN (GLUCOPHAGE) 500 MG tablet Take 500 mg by mouth 2 times daily (with meals)       metFORMIN (GLUCOPHAGE-XR) 500 MG 24 hr tablet TK 1 T PO BID WC       methadone (DOLOPHINE) 10 MG tablet Take 2 tablets (20 mg) by mouth 2 times daily 20 tablet 0     miconazole (MICATIN/MICRO GUARD) 2 % external powder Apply topically 2 times daily as needed for other (topical candidiasis)  0     ondansetron (ZOFRAN-ODT) 4 MG ODT tab Take 1 tablet (4 mg) by mouth every 6 hours as needed for nausea or vomiting       order for DME Equipment being ordered: Nebulizer 1 each 0     order for DME Equipment being ordered: Nebulizer 1 Package 0     order for DME Equipment being ordered: Hospital Bed, total electric (head, foot, and height adjustments), with any type side rails, with mattress 1 each 0     order for DME Equipment being ordered: Motorized Wheelchair 1 each 0     order for DME Equipment being ordered: Trapeze bar for hospital bed 1 each 0     OXYBUTYNIN CHLORIDE PO Take by mouth 2 times daily       oxybutynin ER (DITROPAN XL) 15 MG 24 hr tablet        pantoprazole (PROTONIX) 40 MG EC tablet Take 1 tablet (40 mg) by mouth daily 90 tablet 1     polyethylene glycol (MIRALAX/GLYCOLAX) packet Take 17 g by mouth 2 times daily as needed (constipation)  0     potassium chloride ER (KLOR-CON M) 20 MEQ CR tablet        rosuvastatin (CRESTOR) 5 MG tablet Take 1 tablet (5 mg) by mouth At Bedtime 90 tablet 1     senna-docusate (SENOKOT-S;PERICOLACE) 8.6-50 MG per tablet Take 2 tablets by mouth 2 times daily (Patient taking differently: Take 2 tablets by mouth daily ) 100 tablet      tiZANidine (ZANAFLEX) 4 MG tablet TK ONE T PO TID PRN FOR MUSCLE SPASMS       traZODone (DESYREL) 100 MG tablet Take 100 mg by mouth At Bedtime       VENTOLIN  (90 Base) MCG/ACT inhaler INHALE  2 PUFFS INTO THE LUNGS EVERY 6 HOURS AS NEEDED FOR SHORTNESS OF BREATH 18 g 0       ALLERGIES  Povidone iodine; Toradol [ketorolac]; and Tramadol     Patient reported vitals:  BP:114/70  Heart rate:73  Weight:220 taken on April 1st    [facility]    Review Of Systems  Skin: negative  Eyes: negative  Ears/Nose/Throat: negative  Respiratory: No shortness of breath, dyspnea on exertion, cough, or hemoptysis  Cardiovascular: negative  Gastrointestinal: negative  Genitourinary: negative  Musculoskeletal: back pain  Neurologic: numbness or tingling of feet  Psychiatric: negative  Hematologic/Lymphatic/Immunologic: negative  Endocrine: thyroid disorder and diabetes  (ROS TAKEN PRIOR TO VISIT)    Patient agreeable and consented for telephone visit:  Yes    HISTORY OF PRESENT ILLNESS  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  She is a patient of Dr. Celaya seen in our clinic for stress-induced CM, HTN, hyperlipidemia, DM, morbid obesity, untreated ANIYA, neurogenic bladder with suprapubic catheter, asthma, and chronic pain syndrome on methadone.     Bhavani was admitted to [facility] (12/29/2019) for AMS and found to have a perforated gastric ulcer, septic shock, and respiratory failure. She was quite sick and required mechanical ventilation.   Her troponin elevated to 19.4 prompting cardiology consultation. There were no new concerning EKG changes.  An ECHO showed LVEF 30% with tako-tsubo like RWMA, moderate RV dysfunction, and no significant valvular pathology.  She underwent exploratory laparoscopy revealing perforation of duodenum with ischemia and GI bleed.  She had emergent lysis of adhesions and  placement of a Malecot drain and wound VAC (12/30/19). Repeat surgery for partial closure of perforation, but unable to completely repair due to size.     She had a repeat ECHO (1/14/20) when her status improved.  This showed LVEF 60%, probably normal RV function and limited visualization of  wall motion. Antiplatelet agents were to be avoided for 2 months and it was felt that she suffered a type II MI from septic shock.  Her post-operative period was complicated by acute hypoxic respiratory failure due to flash pulmonary edema and aspiration pneumonia. She was started on Crestor and Amlodipine.  An outpt cMRI was recommended.      Since then, Bhavani has had multiple re-hospitalizations for ongoing abdominal abscesses and sepsis.(last 3/16/20)  A ECHO was repeated (3/14/20) showing LVEF 60%, grade II LVD, moderate RV dysfunction/RV enlargement (similar) She did not have any apparent cardiovascular complications    Last month, Ms White self-stopped her Lasix and denied any HF symptoms. She is fairly immobile and is wheelchair bound.    Ms. White underwent a cMRI (5/5/20) showing LVEF 67% with moderate hypokinesia of the mid to apical inferior wall.  The overall function of the RV is normal and normal RV size. There was no significant valvular pathology.  The stress portion showed a small fixed perfusion defect involving the apical inferior wall consistent with a small infarction in the dRCA without significant obed-infarct ischemia. She was noted to have small bilateral pleural effusions (improved from chest CT in 3/2020)    Our visit today is for further review.    Ms White denies any CP or SOB  She is very inactive and uses a wheelchair to get around.  She has not had any significant leg swelling.  She does not weigh herself but does not feel like she is gaining weight.  She denies any palpitations, lightheadedness, or orthopnea.  I reviewed her cMRI with her today        I reviewed her labs (5/1/2020)  Total cholesterol: 113  Triglycerides: 130  HDL 43  LDL: 44  ALT: 19  Sodium: 135  Potassium: 4.1  BUN: 14  Creatinine: 0.65      ASSESSMENT AND PLAN    Mixed Cardiomyopathy  -LVEF 43% at time of septic shock (12/29/19)   -LVEF 60% with ongoing moderate RV dysfunction / RV enlargement (3/14/20)  -cMRI  (5/5/20) shows LVEF 65% with normal RV function with evidence of infarct in the dRCA distribution  -untreated ANIYA &/ morbid obesity contributing to her right HF  -self-stopped Lasix last month and denies any HF symptoms  -discussed low salt diet / weight loss      CAD:  -s/p NSTEMI (12/2019)   -cMRI shows infarct in dRCA distribution without obed-infarct ischemia  -denies angina  -will discuss with Dr. Celaya about starting a beta-blocker and low dose ASA     S/p Perforated duodenal ulcer and emergent lysis and drain placement (12/30/19)  -prolonged antibiotics due to recurrent abdominal infections/abscesses  -drains now have been removed and antibiotics completed  - no antiplatelets due to gastric perforated ulcer (1/2020)     HTN:  -On Amlodipine 5mg    -BP controlled at home     Untreated ANIYA  -refuses CPAP     Hyperlipidemia:  -Crestor 5mg started (1/2020)  -LDL 44 (5/1/20)              I have reviewed the note as documented above.  This accurately captures the substance of my conversation with the patient.      Phone call contact time  Call Started at 14:03 pm  Call Ended at 14:19 pm    ELISSA Friedman, CNP

## 2020-05-26 ENCOUNTER — VIRTUAL VISIT (OUTPATIENT)
Dept: CARDIOLOGY | Facility: CLINIC | Age: 65
End: 2020-05-26
Attending: NURSE PRACTITIONER
Payer: MEDICARE

## 2020-05-26 ENCOUNTER — TELEPHONE (OUTPATIENT)
Dept: CARDIOLOGY | Facility: CLINIC | Age: 65
End: 2020-05-26

## 2020-05-26 VITALS
SYSTOLIC BLOOD PRESSURE: 114 MMHG | DIASTOLIC BLOOD PRESSURE: 70 MMHG | HEART RATE: 73 BPM | BODY MASS INDEX: 36.65 KG/M2 | HEIGHT: 65 IN | WEIGHT: 220 LBS

## 2020-05-26 DIAGNOSIS — I21.4 NSTEMI (NON-ST ELEVATED MYOCARDIAL INFARCTION) (H): ICD-10-CM

## 2020-05-26 DIAGNOSIS — I21.4 NSTEMI (NON-ST ELEVATED MYOCARDIAL INFARCTION) (H): Primary | ICD-10-CM

## 2020-05-26 DIAGNOSIS — I50.810 RIGHT-SIDED HEART FAILURE, UNSPECIFIED HF CHRONICITY (H): ICD-10-CM

## 2020-05-26 PROCEDURE — 99442: CPT | Performed by: NURSE PRACTITIONER

## 2020-05-26 ASSESSMENT — MIFFLIN-ST. JEOR: SCORE: 1553.79

## 2020-05-26 NOTE — LETTER
5/26/2020    Skylar Moore [place]  [id number] [facility] [id number]    RE: Bhavani White       Dear Colleague,    I had the pleasure of seeing Bhavani White in the [facility].      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  She is a patient of Dr. Celaya seen in our clinic for stress-induced CM, HTN, hyperlipidemia, DM, morbid obesity, untreated ANIYA, neurogenic bladder with suprapubic catheter, asthma, and chronic pain syndrome on methadone.     Bhavani was admitted to [facility] (12/29/2019) for AMS and found to have a perforated gastric ulcer, septic shock, and respiratory failure. She was quite sick and required mechanical ventilation.   Her troponin elevated to 19.4 prompting cardiology consultation. There were no new concerning EKG changes.  An ECHO showed LVEF 30% with tako-tsubo like RWMA, moderate RV dysfunction, and no significant valvular pathology.  She underwent exploratory laparoscopy revealing perforation of duodenum with ischemia and GI bleed.  She had emergent lysis of adhesions and  placement of a Malecot drain and wound VAC (12/30/19). Repeat surgery for partial closure of perforation, but unable to completely repair due to size.     She had a repeat ECHO (1/14/20) when her status improved.  This showed LVEF 60%, probably normal RV function and limited visualization of wall motion. Antiplatelet agents were to be avoided for 2 months and it was felt that she suffered a type II MI from septic shock.  Her post-operative period was complicated by acute hypoxic respiratory failure due to flash pulmonary edema and aspiration pneumonia. She was started on Crestor and Amlodipine.  An outpt cMRI was recommended.      Since then, Bhavani has had multiple re-hospitalizations for ongoing abdominal abscesses and sepsis.(last 3/16/20)  A ECHO was repeated (3/14/20) showing LVEF 60%, grade II LVD, moderate  RV dysfunction/RV enlargement (similar) She did not have any apparent cardiovascular complications    Last month, Ms White self-stopped her Lasix and denied any HF symptoms. She is fairly immobile and is wheelchair bound.    Ms. White underwent a cMRI (5/5/20) showing LVEF 67% with moderate hypokinesia of the mid to apical inferior wall.  The overall function of the RV is normal and normal RV size. There was no significant valvular pathology.  The stress portion showed a small fixed perfusion defect involving the apical inferior wall consistent with a small infarction in the dRCA without significant obed-infarct ischemia. She was noted to have small bilateral pleural effusions (improved from chest CT in 3/2020)    Our visit today is for further review.    Ms White denies any CP or SOB  She is very inactive and uses a wheelchair to get around.  She has not had any significant leg swelling.  She does not weigh herself but does not feel like she is gaining weight.  She denies any palpitations, lightheadedness, or orthopnea.  I reviewed her cMRI with her today        I reviewed her labs (5/1/2020)  Total cholesterol: 113  Triglycerides: 130  HDL 43  LDL: 44  ALT: 19  Sodium: 135  Potassium: 4.1  BUN: 14  Creatinine: 0.65      ASSESSMENT AND PLAN    Mixed Cardiomyopathy  -LVEF 43% at time of septic shock (12/29/19)   -LVEF 60% with ongoing moderate RV dysfunction / RV enlargement (3/14/20)  -cMRI (5/5/20) shows LVEF 65% with normal RV function with evidence of infarct in the dRCA distribution  -untreated ANIYA &/ morbid obesity contributing to her right HF  -self-stopped Lasix last month and denies any HF symptoms  -discussed low salt diet / weight loss      CAD:  -s/p NSTEMI (12/2019)   -cMRI shows infarct in dRCA distribution without obed-infarct ischemia  -denies angina  -will discuss with Dr. Celaya about starting a beta-blocker and low dose ASA     S/p Perforated duodenal ulcer and emergent lysis and drain placement  (12/30/19)  -prolonged antibiotics due to recurrent abdominal infections/abscesses  -drains now have been removed and antibiotics completed  - no antiplatelets due to gastric perforated ulcer (1/2020)     HTN:  -On Amlodipine 5mg    -BP controlled at home     Untreated ANIYA  -refuses CPAP     Hyperlipidemia:  -Crestor 5mg started (1/2020)  -LDL 44 (5/1/20)              I have reviewed the note as documented above.  This accurately captures the substance of my conversation with the patient.      Thank you for allowing me to participate in the care of your patient.    Sincerely,     ELISSA Spears [facility]

## 2020-05-26 NOTE — TELEPHONE ENCOUNTER
Pls review recent cMRI showing likely distal RCA infarct.  She is now several months out from her surgery and stable at home.  Should we start ASA 81mg and Metoprolol?  Pls advise  Thanks, JS

## 2020-05-28 RX ORDER — METOPROLOL SUCCINATE 25 MG/1
12.5 TABLET, EXTENDED RELEASE ORAL DAILY
Qty: 45 TABLET | Refills: 1 | Status: ON HOLD | OUTPATIENT
Start: 2020-05-28 | End: 2020-06-16

## 2020-05-28 NOTE — TELEPHONE ENCOUNTER
Call placed to pt to review recommendation per Dr. Celaya to start metoprolol succinate 12.5mg daily. Pt verbalized understanding and agrees to start taking medication. Script sent to pt's preferred pharmacy. Pt advised to monitor BP and HR over the next 7-10 days after initiation of medication and call if she should have hypotension of side effects from the medication.     cons tact information sent to pt through [facility] per her request.     CARLITA Neely RN, BSN.   05/28/20 2:59 PM

## 2020-06-03 ENCOUNTER — APPOINTMENT (OUTPATIENT)
Dept: CT IMAGING | Facility: CLINIC | Age: 65
End: 2020-06-03
Attending: EMERGENCY MEDICINE
Payer: MEDICARE

## 2020-06-03 ENCOUNTER — HOSPITAL ENCOUNTER (INPATIENT)
Facility: CLINIC | Age: 65
LOS: 13 days | Discharge: HOME HEALTH | DRG: 389 | End: 2020-06-16
Attending: INTERNAL MEDICINE | Admitting: HOSPITALIST
Payer: MEDICARE

## 2020-06-03 ENCOUNTER — HOSPITAL ENCOUNTER (EMERGENCY)
Facility: CLINIC | Age: 65
Discharge: ANOTHER HOSPITAL | End: 2020-06-03
Attending: EMERGENCY MEDICINE | Admitting: EMERGENCY MEDICINE
Payer: MEDICARE

## 2020-06-03 VITALS
DIASTOLIC BLOOD PRESSURE: 86 MMHG | BODY MASS INDEX: 34.53 KG/M2 | HEIGHT: 67 IN | OXYGEN SATURATION: 98 % | SYSTOLIC BLOOD PRESSURE: 146 MMHG | RESPIRATION RATE: 16 BRPM | HEART RATE: 82 BPM | WEIGHT: 220 LBS | TEMPERATURE: 99.2 F

## 2020-06-03 DIAGNOSIS — K56.609 SBO (SMALL BOWEL OBSTRUCTION) (H): Primary | ICD-10-CM

## 2020-06-03 DIAGNOSIS — E11.21 TYPE 2 DIABETES MELLITUS WITH DIABETIC NEPHROPATHY, WITHOUT LONG-TERM CURRENT USE OF INSULIN (H): ICD-10-CM

## 2020-06-03 DIAGNOSIS — K56.609 SBO (SMALL BOWEL OBSTRUCTION) (H): ICD-10-CM

## 2020-06-03 DIAGNOSIS — J96.02 ACUTE RESPIRATORY FAILURE WITH HYPOXIA AND HYPERCAPNIA (H): ICD-10-CM

## 2020-06-03 DIAGNOSIS — N39.0 RECURRENT UTI (URINARY TRACT INFECTION): ICD-10-CM

## 2020-06-03 DIAGNOSIS — J96.01 ACUTE RESPIRATORY FAILURE WITH HYPOXIA AND HYPERCAPNIA (H): ICD-10-CM

## 2020-06-03 LAB
ALBUMIN SERPL-MCNC: 4 G/DL (ref 3.4–5)
ALP SERPL-CCNC: 107 U/L (ref 40–150)
ALT SERPL W P-5'-P-CCNC: 24 U/L (ref 0–50)
ANION GAP SERPL CALCULATED.3IONS-SCNC: 6 MMOL/L (ref 3–14)
AST SERPL W P-5'-P-CCNC: 20 U/L (ref 0–45)
BASOPHILS # BLD AUTO: 0 10E9/L (ref 0–0.2)
BASOPHILS NFR BLD AUTO: 0.2 %
BILIRUB SERPL-MCNC: 0.4 MG/DL (ref 0.2–1.3)
BUN SERPL-MCNC: 19 MG/DL (ref 7–30)
CALCIUM SERPL-MCNC: 11 MG/DL (ref 8.5–10.1)
CHLORIDE SERPL-SCNC: 95 MMOL/L (ref 94–109)
CO2 SERPL-SCNC: 35 MMOL/L (ref 20–32)
CREAT SERPL-MCNC: 0.68 MG/DL (ref 0.52–1.04)
DIFFERENTIAL METHOD BLD: ABNORMAL
EOSINOPHIL # BLD AUTO: 0 10E9/L (ref 0–0.7)
EOSINOPHIL NFR BLD AUTO: 0 %
ERYTHROCYTE [DISTWIDTH] IN BLOOD BY AUTOMATED COUNT: 21.2 % (ref 10–15)
GFR SERPL CREATININE-BSD FRML MDRD: >90 ML/MIN/{1.73_M2}
GLUCOSE BLDC GLUCOMTR-MCNC: 113 MG/DL (ref 70–99)
GLUCOSE SERPL-MCNC: 151 MG/DL (ref 70–99)
HCT VFR BLD AUTO: 45.5 % (ref 35–47)
HGB BLD-MCNC: 13.4 G/DL (ref 11.7–15.7)
IMM GRANULOCYTES # BLD: 0.1 10E9/L (ref 0–0.4)
IMM GRANULOCYTES NFR BLD: 0.4 %
LACTATE BLD-SCNC: 1.3 MMOL/L (ref 0.7–2)
LIPASE SERPL-CCNC: 34 U/L (ref 73–393)
LYMPHOCYTES # BLD AUTO: 0.9 10E9/L (ref 0.8–5.3)
LYMPHOCYTES NFR BLD AUTO: 5.8 %
MCH RBC QN AUTO: 21.7 PG (ref 26.5–33)
MCHC RBC AUTO-ENTMCNC: 29.5 G/DL (ref 31.5–36.5)
MCV RBC AUTO: 74 FL (ref 78–100)
MONOCYTES # BLD AUTO: 0.4 10E9/L (ref 0–1.3)
MONOCYTES NFR BLD AUTO: 2.9 %
NEUTROPHILS # BLD AUTO: 13.9 10E9/L (ref 1.6–8.3)
NEUTROPHILS NFR BLD AUTO: 90.7 %
NRBC # BLD AUTO: 0 10*3/UL
NRBC BLD AUTO-RTO: 0 /100
PLATELET # BLD AUTO: 342 10E9/L (ref 150–450)
POTASSIUM SERPL-SCNC: 3.7 MMOL/L (ref 3.4–5.3)
PROT SERPL-MCNC: 9.3 G/DL (ref 6.8–8.8)
RBC # BLD AUTO: 6.17 10E12/L (ref 3.8–5.2)
SODIUM SERPL-SCNC: 136 MMOL/L (ref 133–144)
TROPONIN I SERPL-MCNC: <0.015 UG/L (ref 0–0.04)
WBC # BLD AUTO: 15.3 10E9/L (ref 4–11)

## 2020-06-03 PROCEDURE — 96376 TX/PRO/DX INJ SAME DRUG ADON: CPT

## 2020-06-03 PROCEDURE — 3E0336Z INTRODUCTION OF NUTRITIONAL SUBSTANCE INTO PERIPHERAL VEIN, PERCUTANEOUS APPROACH: ICD-10-PCS | Performed by: HOSPITALIST

## 2020-06-03 PROCEDURE — 25000128 H RX IP 250 OP 636: Performed by: PHYSICIAN ASSISTANT

## 2020-06-03 PROCEDURE — 96375 TX/PRO/DX INJ NEW DRUG ADDON: CPT

## 2020-06-03 PROCEDURE — 80053 COMPREHEN METABOLIC PANEL: CPT | Performed by: EMERGENCY MEDICINE

## 2020-06-03 PROCEDURE — 25800030 ZZH RX IP 258 OP 636: Performed by: PHYSICIAN ASSISTANT

## 2020-06-03 PROCEDURE — 99285 EMERGENCY DEPT VISIT HI MDM: CPT | Mod: 25

## 2020-06-03 PROCEDURE — C9113 INJ PANTOPRAZOLE SODIUM, VIA: HCPCS | Performed by: PHYSICIAN ASSISTANT

## 2020-06-03 PROCEDURE — 12000000 ZZH R&B MED SURG/OB

## 2020-06-03 PROCEDURE — 87635 SARS-COV-2 COVID-19 AMP PRB: CPT | Performed by: EMERGENCY MEDICINE

## 2020-06-03 PROCEDURE — 00000146 ZZHCL STATISTIC GLUCOSE BY METER IP

## 2020-06-03 PROCEDURE — 25000125 ZZHC RX 250

## 2020-06-03 PROCEDURE — 83690 ASSAY OF LIPASE: CPT | Performed by: EMERGENCY MEDICINE

## 2020-06-03 PROCEDURE — 96361 HYDRATE IV INFUSION ADD-ON: CPT

## 2020-06-03 PROCEDURE — 99222 1ST HOSP IP/OBS MODERATE 55: CPT | Performed by: SURGERY

## 2020-06-03 PROCEDURE — 85025 COMPLETE CBC W/AUTO DIFF WBC: CPT | Performed by: EMERGENCY MEDICINE

## 2020-06-03 PROCEDURE — 25800030 ZZH RX IP 258 OP 636: Performed by: EMERGENCY MEDICINE

## 2020-06-03 PROCEDURE — 99223 1ST HOSP IP/OBS HIGH 75: CPT | Mod: AI | Performed by: PHYSICIAN ASSISTANT

## 2020-06-03 PROCEDURE — 96374 THER/PROPH/DIAG INJ IV PUSH: CPT | Mod: 59

## 2020-06-03 PROCEDURE — 83605 ASSAY OF LACTIC ACID: CPT | Performed by: EMERGENCY MEDICINE

## 2020-06-03 PROCEDURE — 25000128 H RX IP 250 OP 636: Performed by: EMERGENCY MEDICINE

## 2020-06-03 PROCEDURE — C9803 HOPD COVID-19 SPEC COLLECT: HCPCS

## 2020-06-03 PROCEDURE — 74177 CT ABD & PELVIS W/CONTRAST: CPT

## 2020-06-03 PROCEDURE — 84484 ASSAY OF TROPONIN QUANT: CPT | Performed by: EMERGENCY MEDICINE

## 2020-06-03 RX ORDER — HYDROMORPHONE HYDROCHLORIDE 1 MG/ML
0.2 INJECTION, SOLUTION INTRAMUSCULAR; INTRAVENOUS; SUBCUTANEOUS
Status: DISCONTINUED | OUTPATIENT
Start: 2020-06-03 | End: 2020-06-04

## 2020-06-03 RX ORDER — POTASSIUM CHLORIDE 1.5 G/1.58G
20-40 POWDER, FOR SOLUTION ORAL
Status: DISCONTINUED | OUTPATIENT
Start: 2020-06-03 | End: 2020-06-16 | Stop reason: HOSPADM

## 2020-06-03 RX ORDER — ONDANSETRON 4 MG/1
4 TABLET, ORALLY DISINTEGRATING ORAL EVERY 6 HOURS PRN
Status: DISCONTINUED | OUTPATIENT
Start: 2020-06-03 | End: 2020-06-16 | Stop reason: HOSPADM

## 2020-06-03 RX ORDER — METOPROLOL TARTRATE 1 MG/ML
2.5 INJECTION, SOLUTION INTRAVENOUS EVERY 4 HOURS PRN
Status: DISCONTINUED | OUTPATIENT
Start: 2020-06-03 | End: 2020-06-10

## 2020-06-03 RX ORDER — HYDROMORPHONE HYDROCHLORIDE 1 MG/ML
0.5 INJECTION, SOLUTION INTRAMUSCULAR; INTRAVENOUS; SUBCUTANEOUS
Status: COMPLETED | OUTPATIENT
Start: 2020-06-03 | End: 2020-06-03

## 2020-06-03 RX ORDER — NICOTINE POLACRILEX 4 MG
15-30 LOZENGE BUCCAL
Status: DISCONTINUED | OUTPATIENT
Start: 2020-06-03 | End: 2020-06-16 | Stop reason: HOSPADM

## 2020-06-03 RX ORDER — LIDOCAINE 40 MG/G
CREAM TOPICAL
Status: DISCONTINUED | OUTPATIENT
Start: 2020-06-03 | End: 2020-06-16 | Stop reason: HOSPADM

## 2020-06-03 RX ORDER — HYDRALAZINE HYDROCHLORIDE 20 MG/ML
10 INJECTION INTRAMUSCULAR; INTRAVENOUS EVERY 4 HOURS PRN
Status: DISCONTINUED | OUTPATIENT
Start: 2020-06-03 | End: 2020-06-10

## 2020-06-03 RX ORDER — LIDOCAINE HYDROCHLORIDE 20 MG/ML
5 SOLUTION OROPHARYNGEAL ONCE
Status: COMPLETED | OUTPATIENT
Start: 2020-06-03 | End: 2020-06-03

## 2020-06-03 RX ORDER — PROCHLORPERAZINE 25 MG
25 SUPPOSITORY, RECTAL RECTAL EVERY 12 HOURS PRN
Status: DISCONTINUED | OUTPATIENT
Start: 2020-06-03 | End: 2020-06-16 | Stop reason: HOSPADM

## 2020-06-03 RX ORDER — ONDANSETRON 2 MG/ML
4 INJECTION INTRAMUSCULAR; INTRAVENOUS EVERY 6 HOURS PRN
Status: DISCONTINUED | OUTPATIENT
Start: 2020-06-03 | End: 2020-06-16 | Stop reason: HOSPADM

## 2020-06-03 RX ORDER — CARBOXYMETHYLCELLULOSE SODIUM 10 MG/ML
1 GEL OPHTHALMIC 3 TIMES DAILY PRN
Status: DISCONTINUED | OUTPATIENT
Start: 2020-06-03 | End: 2020-06-16 | Stop reason: HOSPADM

## 2020-06-03 RX ORDER — POTASSIUM CHLORIDE 7.45 MG/ML
10 INJECTION INTRAVENOUS
Status: DISCONTINUED | OUTPATIENT
Start: 2020-06-03 | End: 2020-06-16 | Stop reason: HOSPADM

## 2020-06-03 RX ORDER — ALBUTEROL SULFATE 90 UG/1
2 AEROSOL, METERED RESPIRATORY (INHALATION) EVERY 6 HOURS PRN
Status: DISCONTINUED | OUTPATIENT
Start: 2020-06-03 | End: 2020-06-08

## 2020-06-03 RX ORDER — ALBUTEROL SULFATE 90 UG/1
2 AEROSOL, METERED RESPIRATORY (INHALATION) EVERY 6 HOURS PRN
Status: DISCONTINUED | OUTPATIENT
Start: 2020-06-03 | End: 2020-06-03

## 2020-06-03 RX ORDER — SODIUM CHLORIDE, SODIUM LACTATE, POTASSIUM CHLORIDE, CALCIUM CHLORIDE 600; 310; 30; 20 MG/100ML; MG/100ML; MG/100ML; MG/100ML
INJECTION, SOLUTION INTRAVENOUS CONTINUOUS
Status: DISCONTINUED | OUTPATIENT
Start: 2020-06-03 | End: 2020-06-06

## 2020-06-03 RX ORDER — ONDANSETRON 2 MG/ML
4 INJECTION INTRAMUSCULAR; INTRAVENOUS ONCE
Status: COMPLETED | OUTPATIENT
Start: 2020-06-03 | End: 2020-06-03

## 2020-06-03 RX ORDER — HYDROMORPHONE HYDROCHLORIDE 1 MG/ML
0.5 INJECTION, SOLUTION INTRAMUSCULAR; INTRAVENOUS; SUBCUTANEOUS ONCE
Status: COMPLETED | OUTPATIENT
Start: 2020-06-03 | End: 2020-06-03

## 2020-06-03 RX ORDER — NALOXONE HYDROCHLORIDE 0.4 MG/ML
.1-.4 INJECTION, SOLUTION INTRAMUSCULAR; INTRAVENOUS; SUBCUTANEOUS
Status: DISCONTINUED | OUTPATIENT
Start: 2020-06-03 | End: 2020-06-16 | Stop reason: HOSPADM

## 2020-06-03 RX ORDER — LIDOCAINE HYDROCHLORIDE 20 MG/ML
SOLUTION OROPHARYNGEAL
Status: COMPLETED
Start: 2020-06-03 | End: 2020-06-03

## 2020-06-03 RX ORDER — ACETAMINOPHEN 325 MG/1
650 TABLET ORAL EVERY 4 HOURS PRN
Status: DISCONTINUED | OUTPATIENT
Start: 2020-06-03 | End: 2020-06-08

## 2020-06-03 RX ORDER — POTASSIUM CL/LIDO/0.9 % NACL 10MEQ/0.1L
10 INTRAVENOUS SOLUTION, PIGGYBACK (ML) INTRAVENOUS
Status: DISCONTINUED | OUTPATIENT
Start: 2020-06-03 | End: 2020-06-16 | Stop reason: HOSPADM

## 2020-06-03 RX ORDER — AMLODIPINE BESYLATE 5 MG/1
5 TABLET ORAL DAILY
Status: DISCONTINUED | OUTPATIENT
Start: 2020-06-04 | End: 2020-06-03

## 2020-06-03 RX ORDER — DEXTROSE MONOHYDRATE 25 G/50ML
25-50 INJECTION, SOLUTION INTRAVENOUS
Status: DISCONTINUED | OUTPATIENT
Start: 2020-06-03 | End: 2020-06-16 | Stop reason: HOSPADM

## 2020-06-03 RX ORDER — PROCHLORPERAZINE MALEATE 10 MG
10 TABLET ORAL EVERY 6 HOURS PRN
Status: DISCONTINUED | OUTPATIENT
Start: 2020-06-03 | End: 2020-06-16 | Stop reason: HOSPADM

## 2020-06-03 RX ORDER — MAGNESIUM SULFATE HEPTAHYDRATE 40 MG/ML
4 INJECTION, SOLUTION INTRAVENOUS EVERY 4 HOURS PRN
Status: DISCONTINUED | OUTPATIENT
Start: 2020-06-03 | End: 2020-06-16 | Stop reason: HOSPADM

## 2020-06-03 RX ORDER — POTASSIUM CHLORIDE 1500 MG/1
20-40 TABLET, EXTENDED RELEASE ORAL
Status: DISCONTINUED | OUTPATIENT
Start: 2020-06-03 | End: 2020-06-16 | Stop reason: HOSPADM

## 2020-06-03 RX ORDER — POTASSIUM CHLORIDE 29.8 MG/ML
20 INJECTION INTRAVENOUS
Status: DISCONTINUED | OUTPATIENT
Start: 2020-06-03 | End: 2020-06-16 | Stop reason: HOSPADM

## 2020-06-03 RX ORDER — IOPAMIDOL 755 MG/ML
100 INJECTION, SOLUTION INTRAVASCULAR ONCE
Status: COMPLETED | OUTPATIENT
Start: 2020-06-03 | End: 2020-06-03

## 2020-06-03 RX ORDER — ACETAMINOPHEN 650 MG/1
650 SUPPOSITORY RECTAL EVERY 4 HOURS PRN
Status: DISCONTINUED | OUTPATIENT
Start: 2020-06-03 | End: 2020-06-16 | Stop reason: HOSPADM

## 2020-06-03 RX ADMIN — LIDOCAINE HYDROCHLORIDE 5 ML: 20 SOLUTION OROPHARYNGEAL at 17:18

## 2020-06-03 RX ADMIN — LIDOCAINE HYDROCHLORIDE 5 ML: 20 SOLUTION ORAL; TOPICAL at 17:18

## 2020-06-03 RX ADMIN — HYDROMORPHONE HYDROCHLORIDE 0.5 MG: 1 INJECTION, SOLUTION INTRAMUSCULAR; INTRAVENOUS; SUBCUTANEOUS at 18:45

## 2020-06-03 RX ADMIN — HYDROMORPHONE HYDROCHLORIDE 0.5 MG: 1 INJECTION, SOLUTION INTRAMUSCULAR; INTRAVENOUS; SUBCUTANEOUS at 16:38

## 2020-06-03 RX ADMIN — HYDROMORPHONE HYDROCHLORIDE 0.5 MG: 1 INJECTION, SOLUTION INTRAMUSCULAR; INTRAVENOUS; SUBCUTANEOUS at 16:00

## 2020-06-03 RX ADMIN — PANTOPRAZOLE SODIUM 40 MG: 40 INJECTION, POWDER, FOR SOLUTION INTRAVENOUS at 22:51

## 2020-06-03 RX ADMIN — HYDROMORPHONE HYDROCHLORIDE 0.5 MG: 1 INJECTION, SOLUTION INTRAMUSCULAR; INTRAVENOUS; SUBCUTANEOUS at 14:48

## 2020-06-03 RX ADMIN — ONDANSETRON 4 MG: 2 INJECTION INTRAMUSCULAR; INTRAVENOUS at 14:47

## 2020-06-03 RX ADMIN — HYDROMORPHONE HYDROCHLORIDE 0.5 MG: 1 INJECTION, SOLUTION INTRAMUSCULAR; INTRAVENOUS; SUBCUTANEOUS at 17:17

## 2020-06-03 RX ADMIN — HYDROMORPHONE HYDROCHLORIDE 0.2 MG: 1 INJECTION, SOLUTION INTRAMUSCULAR; INTRAVENOUS; SUBCUTANEOUS at 22:17

## 2020-06-03 RX ADMIN — SODIUM CHLORIDE 1000 ML: 9 INJECTION, SOLUTION INTRAVENOUS at 17:17

## 2020-06-03 RX ADMIN — IOPAMIDOL 100 ML: 755 INJECTION, SOLUTION INTRAVENOUS at 15:40

## 2020-06-03 RX ADMIN — SODIUM CHLORIDE, POTASSIUM CHLORIDE, SODIUM LACTATE AND CALCIUM CHLORIDE: 600; 310; 30; 20 INJECTION, SOLUTION INTRAVENOUS at 22:32

## 2020-06-03 ASSESSMENT — ENCOUNTER SYMPTOMS
SHORTNESS OF BREATH: 0
ABDOMINAL PAIN: 1
VOMITING: 1
COUGH: 0
NAUSEA: 1
FEVER: 0

## 2020-06-03 ASSESSMENT — MIFFLIN-ST. JEOR
SCORE: 1585.54
SCORE: 1531.11

## 2020-06-03 NOTE — LETTER
Transition Communication Hand-off for Care Transitions to Next Level of Care Provider    Name: Bhavani White  : 1955  MRN #: [id number]  Primary Care Provider: Skylar Moore     Primary Clinic: [id number] [place] [id number]     Reason for Hospitalization:  Small bowel obstruction   SBO (small bowel obstruction) (H)  Admit Date/Time: 6/3/2020  8:42 PM  Discharge Date: 2020  Payor Source: Payor: MEDICARE / Plan: MEDICARE / Product Type: Medicare /     Readmission Assessment Measure (CHIQUITA) Risk Score/category: High         Reason for Communication Hand-off Referral: Other HIgh CHIQUITA, also received BPCI letter for obstruction, CC referral ordered    Discharge Plan: home today with resumption of Home RN for catheter cares, added Home PT and added Home Infusion services for new enteral feedings through [facility]       Concern for non-adherence with plan of care:   Y/N No, seems well supported and advocates for self, appears to know how to contact care providers.   Discharge Needs Assessment:  Needs      Most Recent Value   Anticipated Changes Related to Illness  none   Equipment Currently Used at Home  wheelchair, power, grab bar, toilet, grab bar, tub/shower, glucometer, hospital bed, tub bench, other (see comments), raised toilet [trapeze]        Follow-up specialty is recommended: Yes, surgical follow up     Follow-up plan:  No future appointments.---patient did not want me to schedule appointment as she coordinates with her spouse    Any outstanding tests or procedures:        Referrals     Future Labs/Procedures    CARE COORDINATION REFERRAL     Comments:    Services are provided by a Care Coordinator for people with complex needs such as: medical, social, or financial troubles.  The Care Coordinator works with the patient and their Primary Care Provider to determine health goals, obtain resources, achieve outcomes, and develop care plans that help coordinate the  patient's care.     Reason for Referral: Bundled Payment for Care Improvement Advanced (BPCI-A for post-hospital discharge)    Additional pertinent details:      Clinical Staff have discussed the Care Coordination Referral with the patient and/or caregiver: yes    Home care nursing referral     Comments:    RN skilled nursing visit. RN to provide tube site care and management.    Your provider has ordered home care nursing services. If you have not been contacted within 2 days of your discharge please call the inpatient department phone number at [phone] .    Home Care PT Referral for Hospital Discharge     Comments:    PT to eval and treat    Your provider has ordered home care - physical therapy. If you have not been contacted within 2 days of your discharge please call the department phone number listed on the top of this document.    Home infusion referral     Comments:    Your provider has referred you to: KEL[facility]: [facility] [place] [phone]   [email or web address]    Local Address (if different from home address): N/A    Anticipated Length of Therapy:     Home Infusion Pharmacist to adjust therapy based on labs and clinical assessments: Yes    Labs:  May draw labs from Venous Catheter: No  Home Infusion Pharmacist to order labs based on therapy type and clinical assessments: Yes  Call/Fax Lab Results to: PCP    Agency Staff to assess nursing needs for Infusion Therapy.    On enteral tube feeds:   Type of Feeding Tube: G-J tube   Enteral Frequency:  Continuous  Enteral Regimen: Isosource 1.5 at 50 mL/hr   Total Enteral Provisions: 1800 kcal (26 kcal/kg), 82 g protein (1.2 g/kg), 211 g CHO, 18 g fiber, 912 mL H2O  Free Water Flush: 240 mL every 6 hours  Total fluid = 1272 mL (19 mL/kg)                       Key Recommendations:  Home today with family help, and home care RN, PT and home infusion enteral services through [facility].     Patricia Alvarez,  RN    AVS/Discharge Summary is the source of truth; this is a helpful guide for improved communication of patient story

## 2020-06-03 NOTE — ED PROVIDER NOTES
History     Chief Complaint:  Abdominal Pain      HPI   Bhavani White is a 64 year old female with a history of perforated duodenal ulcer in January 2020 and subsequent exploratory laparotomy, TRUONG, [place] drain placement with perforation, and wound VAC placement who presents to the emergency department via EMS for evaluation of abdominal pain. The patient reports nausea, vomiting, and right upper quadrant abdominal pain for 3 days. She denies diarrhea. She was given 100 mcg IV fentanyl, 4 mg sublingual Zofran, and 50 mg IV benadryl by EMS en route to the emergency department without relief of her symptoms.  She denies fever/cough/URI symptoms.     Allergies:  Povidone Iodine  Toradol   Tramadol     Medications:    Amlodipine   Crestor   Cymbalta  Ditropan   Gabapentin   DuoNeb solution   Hydroxyzine   Levothyroxine  Lisinopril   Metformin  Methadone   Metoprolol succinate   Ondansetron   Pantoprazole  Tizanidine   Trazodone   Ventolin inhaler     Past Medical History:    Abdominopelvic abscess   Acute kidney injury   Anemia   Asthma  Diabetes mellitus type 2  Generalized anxiety disorder  Fibromyalgia   Hypertension  Hypothyroidism  Latrogenic cushing's disease  Major depressive disorder  MRSA  Neuropathy Spinal stenosis   Non-ST elevated myocardial infarction  Osteoarthritis   Chronic pain  Obstructive sleep apnea   Perforated abdominal ulcer   Ventral hernia     Past Surgical History:    Abdominal surgery   Botox injection into bladder  Carpal tunnel release   Cataract surgery   Cystoscopy   Discectomy, fusion cervical anterior   Gastrostomy tube insertion  Genitourinary surgery   Hernia repair   Hysterectomy   IR jejunostomy tube change   Laminectomy, facetectomy, lumbar  Laparotomy exploratory   Left total knee arthroplasty   Suprapubic catheter placement   Tonsillectomy     Family History:    Mother - hypertension, depression, heart disease  Father - hypertension, connective tissue disorder, prostate  "cancer, bladder cancer    Social History:  Smoking Status: Never Smoker  Smokeless Tobacco: Never Used  Alcohol Use: No  Drug Use: Yes, marijuana   PCP: Skylar Moore  Marital Status:        Review of Systems   Constitutional: Negative for fever.   HENT: Negative for congestion.    Respiratory: Negative for cough and shortness of breath.    Cardiovascular: Negative for chest pain.   Gastrointestinal: Positive for abdominal pain, nausea and vomiting.   All other systems reviewed and are negative.        Physical Exam     Patient Vitals for the past 24 hrs:   BP Temp Temp src Pulse Heart Rate Resp SpO2 Height Weight   06/03/20 1600 (!) 163/93 -- -- 82 84 19 92 % -- --   06/03/20 1500 (!) 185/101 -- -- 74 80 17 94 % -- --   06/03/20 1435 (!) 179/111 99.2  F (37.3  C) Oral -- 97 18 96 % 1.702 m (5' 7\") 99.8 kg (220 lb)       Physical Exam    Nursing note and vitals reviewed.  Constitutional: Cooperative.   HENT:   Mouth/Throat: Moist mucous membranes.   Eyes: EOMI, nonicteric sclera  Cardiovascular: Normal rate, regular rhythm, no murmurs, rubs, or gallops  Pulmonary/Chest: Effort normal and breath sounds normal. No respiratory distress. No wheezes. No rales.   Abdominal: Prominent abdominal midline scar and laparatomy sites. Abd mildly distended with diffuse guarding.   Musculoskeletal: Normal range of motion.   Neurological: Alert. Moves all extremities spontaneously.   Skin: Skin is warm and dry. No rash noted.   Psychiatric: Normal mood and affect.     Emergency Department Course     Imaging:  Radiology findings were communicated with the patient who voiced understanding of the findings.    Abd/pelvis CT,  IV  contrast only TRAUMA / AAA  IMPRESSION:   1.  Small-bowel obstruction involving the proximal jejunum with transition point in the midline upper abdomen.  2.  Cholelithiasis and probable cirrhotic liver morphology. Spleen is normal in size.  3.  Upper abdominal ascites has slightly improved " since prior exam.  4.  Stable left renal cortical cysts. Pubic catheter is again noted in the bladder. Gastrostomy tube has been removed.    Imaging independently reviewed and agree with radiologist interpretation.     Laboratory:  Laboratory findings were communicated with the patient who voiced understanding of the findings.    Troponin(1442): <0.015    Lactic acid whole blood(1450): 1.3    Lipase: 34 (L)    CBC: WBC: 15.3 (H), HGB: 13.4, PLT: 342    CMP: Glucose 151 (H), carbon dioxide 35 (H), calcium 11.0 (H), protein albumin 9.3 (H), o/w WNL (Creatinine: 0.68)    Asymptomatic COVID-19 Virus by PCR: Pending    Interventions:  1447 Zofran 4 mg IV  1448 Dilaudid 0.5 mg IV  1600 Dilaudid 0.5 mg IV  1638 Dilaudid 0.5 mg IV  1717 NS 1L IV   1717 Dilaudid 0.5 mg IV  1718 Xylocaine 5 mL mouth/throat    Emergency Department Course:  Past medical records, nursing notes, and vitals reviewed.    1429 I performed an exam of the patient as documented above.     1440 IV was inserted and blood was drawn for laboratory testing, results above.    1534 The patient was sent for a CT Abdomen/Pelvis while in the emergency department, results above.     1632 I rechecked the patient and discussed the results of her workup thus far. Findings and plan explained to the Patient who consents to transfer.     1814 Discussed the patient with Dr. Pat, who will admit the patient to a bed at [facility] for further monitoring, evaluation, and treatment.    I personally reviewed the laboratory and imaging results with the Patient and answered all related questions prior to transfer.     Impression & Plan     Medical Decision Making:  Patient presents with diffuse abdominal pain with associated nausea and vomiting in the context of extensive surgical history after a perforated gastric ulcer.  On exam, she is in quite a bit of pain and diffusely guarding.  CT immediately obtained which suggests a small bowel obstruction,  more proximal than distal with an obvious transition point.  Fortunately, lactic acid level is normal and there are not current signs of perforation.  An NG tube was placed and greater than 1500 mL's of fluid is already been suctioned out.  Patient's pain is improving after this, though she is still diffusely guarding.  I am concerned that patient may need surgical intervention and I discussed with both my surgery service here at [facility], as well as patient's surgery service at [facility], and plan is for patient to be transferred to [facility] will she will be evaluated by general surgery there who have taken care of her in the past.  Dr. Pat from [facility] accepts patient for transfer.  All of patient's questions were answered and she is in agreement with the plan.  She is transferred in stable condition.    Covid-19  Bhavani White was evaluated during a global COVID-19 pandemic, which necessitated consideration that the patient might be at risk for infection with the SARS-CoV-2 virus that causes COVID-19.   Applicable protocols for evaluation were followed during the patient's care. COVID-19 was considered as part of the patient's evaluation. The plan for testing is: a test was obtained during this visit.      Diagnosis:    ICD-10-CM    1. SBO (small bowel obstruction) (H)  K56.609        Disposition:  Transferred to [facility].    Scribe Disclosure:  I, Marianne Sierra, am serving as a scribe at 2:39 PM on 6/3/2020 to document services personally performed by Yoan Lawrence MD based on my observations and the provider's statements to me.        Yoan Lawrence MD  06/03/20 2048

## 2020-06-03 NOTE — LETTER
This letter is to give you information only. No action is required on your part.                               Beneficiary Notification Letter - BPCI Advanced                     Your Doctor or Hospital Has Joined Medicare s New Payment and                                                          Service Delivery Model     Hello,     We wanted to let you know that your health care provider, [facility], has volunteered to take part in our Centers for Medicare & Medicaid Services (CMS) Bundled Payments for Care Improvement Advanced Model (BPCI Advanced). This doesn t change your Medicare rights or benefits and you don t need to do anything.      What are bundled payments?   A bundled payment combines, or bundles together, payments that Medicare makes to your health care providers for the many different kinds of medical services you might get in a specific time period. In BPCI Advanced, this time period could include a hospital inpatient stay or outpatient procedure, plus 90 days.     Why would Medicare bundle payments?   Bundled payments are thought of as a  value-based  way to pay because health care providers are responsible for both the quality and cost of medical care they give. This is a relatively new way of paying health care providers compared to the fee-for-service  way Medicare has traditionally paid, where providers are paid separately for each service they provide. Bundled payments encourage these providers to work together to provide better, more coordinated care during your hospital stay, or outpatient procedure, and through your recovery.     What does BPCI Advanced mean for me?   You re more likely to get even better care when hospitals, doctors, and other health care providers work together. In BPCI Advanced, hospitals, doctors, and other health care providers may be rewarded for providing better, more coordinated health care. Medicare will watch BPCI Advanced participants  closely to make sure that you and other patients keep getting efficient, high quality care.     What do I need to know about BPCI Advanced?   What s most important for you to know is that your Medicare rights and benefits don t change because your health care provider is participating in BPCI Advanced. Medicare will keep covering all of your medically necessary services.       January 2019    Beneficiary Notification Letter - BPCI Advanced (page 2)     Even though Medicare will pay your doctor in a different way under BPCI Advanced, how much you have to pay won t change. Health care providers and suppliers who are enrolled in Medicare will submit their Medicare claims like they always have.     You ll have all the same Medicare rights and protections, including the right to choose which hospital, doctor, or other health care provider you see. If you don t want to get care from a health care provider who s participating in BPCI Advanced, then you ll have to choose a different health care provider who s not participating in the Model.     How can I give feedback about my health care?   Medicare might ask you to take a voluntary survey about the services and care you received from [facility] during your hospital stay or outpatient procedure and for a specific period of time afterwards. You can decide whether you want to take the voluntary survey, but if you do, it ll help Medicare make BPCI Advanced and the care of other Medicare patients better.     If you have concerns or complaints about your care, you can:     Talk to your doctor or health care provider.     Contact your Beneficiary and Family Centered Care Quality Improvement Organization ([employer]). You can get your [employer]-QIO s phone number at Medicare.gov/contacts or by calling 1-800-MEDICARE. [employer] users can call 1-790.624.9112.     Where can I learn more about BPCI Advanced?   Learn more about BPCI Advanced at  https://innovation.cms.gov/initiatives/bpci-advanced/:     A list of all the hospitals and physician group practices in the country participating in BPCI Advanced.     All of the inpatient and outpatient Clinical Episodes that are currently included under BPCI Advanced. A Clinical Episode is a grouping of medical conditions or diagnoses that are included in the BPCI Advanced Model.                                   January 2019

## 2020-06-04 ENCOUNTER — APPOINTMENT (OUTPATIENT)
Dept: GENERAL RADIOLOGY | Facility: CLINIC | Age: 65
DRG: 389 | End: 2020-06-04
Attending: PHYSICIAN ASSISTANT
Payer: MEDICARE

## 2020-06-04 LAB
ALBUMIN SERPL-MCNC: 3 G/DL (ref 3.4–5)
ALP SERPL-CCNC: 79 U/L (ref 40–150)
ALT SERPL W P-5'-P-CCNC: 21 U/L (ref 0–50)
ANION GAP SERPL CALCULATED.3IONS-SCNC: 4 MMOL/L (ref 3–14)
AST SERPL W P-5'-P-CCNC: 16 U/L (ref 0–45)
BILIRUB SERPL-MCNC: 0.5 MG/DL (ref 0.2–1.3)
BUN SERPL-MCNC: 21 MG/DL (ref 7–30)
CALCIUM SERPL-MCNC: 9.8 MG/DL (ref 8.5–10.1)
CHLORIDE SERPL-SCNC: 102 MMOL/L (ref 94–109)
CO2 SERPL-SCNC: 36 MMOL/L (ref 20–32)
CREAT SERPL-MCNC: 0.78 MG/DL (ref 0.52–1.04)
ERYTHROCYTE [DISTWIDTH] IN BLOOD BY AUTOMATED COUNT: 20 % (ref 10–15)
GFR SERPL CREATININE-BSD FRML MDRD: 81 ML/MIN/{1.73_M2}
GLUCOSE BLDC GLUCOMTR-MCNC: 100 MG/DL (ref 70–99)
GLUCOSE BLDC GLUCOMTR-MCNC: 79 MG/DL (ref 70–99)
GLUCOSE BLDC GLUCOMTR-MCNC: 86 MG/DL (ref 70–99)
GLUCOSE BLDC GLUCOMTR-MCNC: 88 MG/DL (ref 70–99)
GLUCOSE BLDC GLUCOMTR-MCNC: 94 MG/DL (ref 70–99)
GLUCOSE SERPL-MCNC: 95 MG/DL (ref 70–99)
HBA1C MFR BLD: 6 % (ref 0–5.6)
HCT VFR BLD AUTO: 37.1 % (ref 35–47)
HGB BLD-MCNC: 11.4 G/DL (ref 11.7–15.7)
MCH RBC QN AUTO: 22.6 PG (ref 26.5–33)
MCHC RBC AUTO-ENTMCNC: 30.7 G/DL (ref 31.5–36.5)
MCV RBC AUTO: 74 FL (ref 78–100)
PLATELET # BLD AUTO: 272 10E9/L (ref 150–450)
POTASSIUM SERPL-SCNC: 3.5 MMOL/L (ref 3.4–5.3)
PROT SERPL-MCNC: 6.6 G/DL (ref 6.8–8.8)
RBC # BLD AUTO: 5.05 10E12/L (ref 3.8–5.2)
SARS-COV-2 RNA SPEC QL NAA+PROBE: NOT DETECTED
SODIUM SERPL-SCNC: 142 MMOL/L (ref 133–144)
SPECIMEN SOURCE: NORMAL
WBC # BLD AUTO: 10.5 10E9/L (ref 4–11)

## 2020-06-04 PROCEDURE — 25000132 ZZH RX MED GY IP 250 OP 250 PS 637: Mod: GY

## 2020-06-04 PROCEDURE — 00000146 ZZHCL STATISTIC GLUCOSE BY METER IP

## 2020-06-04 PROCEDURE — 40000986 XR ABDOMEN 1 VW

## 2020-06-04 PROCEDURE — 25000128 H RX IP 250 OP 636: Performed by: INTERNAL MEDICINE

## 2020-06-04 PROCEDURE — 83036 HEMOGLOBIN GLYCOSYLATED A1C: CPT | Performed by: PHYSICIAN ASSISTANT

## 2020-06-04 PROCEDURE — C9113 INJ PANTOPRAZOLE SODIUM, VIA: HCPCS | Performed by: PHYSICIAN ASSISTANT

## 2020-06-04 PROCEDURE — 85027 COMPLETE CBC AUTOMATED: CPT | Performed by: PHYSICIAN ASSISTANT

## 2020-06-04 PROCEDURE — 99232 SBSQ HOSP IP/OBS MODERATE 35: CPT | Performed by: SURGERY

## 2020-06-04 PROCEDURE — 99232 SBSQ HOSP IP/OBS MODERATE 35: CPT | Performed by: STUDENT IN AN ORGANIZED HEALTH CARE EDUCATION/TRAINING PROGRAM

## 2020-06-04 PROCEDURE — 25800030 ZZH RX IP 258 OP 636: Performed by: PHYSICIAN ASSISTANT

## 2020-06-04 PROCEDURE — 12000000 ZZH R&B MED SURG/OB

## 2020-06-04 PROCEDURE — 80053 COMPREHEN METABOLIC PANEL: CPT | Performed by: PHYSICIAN ASSISTANT

## 2020-06-04 PROCEDURE — 25000128 H RX IP 250 OP 636

## 2020-06-04 PROCEDURE — 25000128 H RX IP 250 OP 636: Performed by: PHYSICIAN ASSISTANT

## 2020-06-04 PROCEDURE — 36415 COLL VENOUS BLD VENIPUNCTURE: CPT | Performed by: PHYSICIAN ASSISTANT

## 2020-06-04 RX ORDER — METHADONE HYDROCHLORIDE 10 MG/ML
10 INJECTION, SOLUTION INTRAMUSCULAR; INTRAVENOUS; SUBCUTANEOUS EVERY 12 HOURS
Status: DISCONTINUED | OUTPATIENT
Start: 2020-06-04 | End: 2020-06-07

## 2020-06-04 RX ORDER — LIDOCAINE 4 G/G
1 PATCH TOPICAL
Status: DISCONTINUED | OUTPATIENT
Start: 2020-06-04 | End: 2020-06-07

## 2020-06-04 RX ORDER — HYDROMORPHONE HYDROCHLORIDE 1 MG/ML
.5-1 INJECTION, SOLUTION INTRAMUSCULAR; INTRAVENOUS; SUBCUTANEOUS
Status: DISCONTINUED | OUTPATIENT
Start: 2020-06-04 | End: 2020-06-04

## 2020-06-04 RX ORDER — LORAZEPAM 2 MG/ML
0.5 INJECTION INTRAMUSCULAR EVERY 4 HOURS PRN
Status: DISCONTINUED | OUTPATIENT
Start: 2020-06-04 | End: 2020-06-08

## 2020-06-04 RX ORDER — HYDROMORPHONE HYDROCHLORIDE 1 MG/ML
0.5 INJECTION, SOLUTION INTRAMUSCULAR; INTRAVENOUS; SUBCUTANEOUS
Status: DISCONTINUED | OUTPATIENT
Start: 2020-06-04 | End: 2020-06-08

## 2020-06-04 RX ADMIN — PANTOPRAZOLE SODIUM 40 MG: 40 INJECTION, POWDER, FOR SOLUTION INTRAVENOUS at 22:41

## 2020-06-04 RX ADMIN — HYDROMORPHONE HYDROCHLORIDE 0.5 MG: 1 INJECTION, SOLUTION INTRAMUSCULAR; INTRAVENOUS; SUBCUTANEOUS at 00:25

## 2020-06-04 RX ADMIN — HYDROMORPHONE HYDROCHLORIDE 0.5 MG: 1 INJECTION, SOLUTION INTRAMUSCULAR; INTRAVENOUS; SUBCUTANEOUS at 05:28

## 2020-06-04 RX ADMIN — ONDANSETRON 4 MG: 2 INJECTION INTRAMUSCULAR; INTRAVENOUS at 08:35

## 2020-06-04 RX ADMIN — SODIUM CHLORIDE, POTASSIUM CHLORIDE, SODIUM LACTATE AND CALCIUM CHLORIDE: 600; 310; 30; 20 INJECTION, SOLUTION INTRAVENOUS at 15:07

## 2020-06-04 RX ADMIN — SODIUM CHLORIDE, POTASSIUM CHLORIDE, SODIUM LACTATE AND CALCIUM CHLORIDE: 600; 310; 30; 20 INJECTION, SOLUTION INTRAVENOUS at 05:31

## 2020-06-04 RX ADMIN — LIDOCAINE 1 PATCH: 560 PATCH PERCUTANEOUS; TOPICAL; TRANSDERMAL at 12:30

## 2020-06-04 RX ADMIN — METHADONE HYDROCHLORIDE 10 MG: 10 INJECTION, SOLUTION INTRAMUSCULAR; INTRAVENOUS; SUBCUTANEOUS at 18:31

## 2020-06-04 RX ADMIN — HYDROMORPHONE HYDROCHLORIDE 0.5 MG: 1 INJECTION, SOLUTION INTRAMUSCULAR; INTRAVENOUS; SUBCUTANEOUS at 07:39

## 2020-06-04 RX ADMIN — LORAZEPAM 0.5 MG: 2 INJECTION INTRAMUSCULAR; INTRAVENOUS at 11:03

## 2020-06-04 RX ADMIN — HYDROMORPHONE HYDROCHLORIDE 0.5 MG: 1 INJECTION, SOLUTION INTRAMUSCULAR; INTRAVENOUS; SUBCUTANEOUS at 02:20

## 2020-06-04 RX ADMIN — PROCHLORPERAZINE EDISYLATE 10 MG: 5 INJECTION INTRAMUSCULAR; INTRAVENOUS at 11:04

## 2020-06-04 RX ADMIN — HYDROMORPHONE HYDROCHLORIDE 0.5 MG: 1 INJECTION, SOLUTION INTRAMUSCULAR; INTRAVENOUS; SUBCUTANEOUS at 18:30

## 2020-06-04 RX ADMIN — HYDROMORPHONE HYDROCHLORIDE 0.5 MG: 1 INJECTION, SOLUTION INTRAMUSCULAR; INTRAVENOUS; SUBCUTANEOUS at 14:28

## 2020-06-04 RX ADMIN — HYDROMORPHONE HYDROCHLORIDE 0.5 MG: 1 INJECTION, SOLUTION INTRAMUSCULAR; INTRAVENOUS; SUBCUTANEOUS at 11:03

## 2020-06-04 RX ADMIN — HYDROMORPHONE HYDROCHLORIDE 0.5 MG: 1 INJECTION, SOLUTION INTRAMUSCULAR; INTRAVENOUS; SUBCUTANEOUS at 22:41

## 2020-06-04 ASSESSMENT — ACTIVITIES OF DAILY LIVING (ADL)
BATHING: 3-->ASSISTIVE EQUIPMENT AND PERSON
ADLS_ACUITY_SCORE: 25
ADLS_ACUITY_SCORE: 22
RETIRED_EATING: 0-->INDEPENDENT
ADLS_ACUITY_SCORE: 23
ADLS_ACUITY_SCORE: 24
DRESS: 2-->ASSISTIVE PERSON
FALL_HISTORY_WITHIN_LAST_SIX_MONTHS: NO
SWALLOWING: 0-->SWALLOWS FOODS/LIQUIDS WITHOUT DIFFICULTY
AMBULATION: 1-->ASSISTIVE EQUIPMENT
RETIRED_COMMUNICATION: 0-->UNDERSTANDS/COMMUNICATES WITHOUT DIFFICULTY
COGNITION: 0 - NO COGNITION ISSUES REPORTED
ADLS_ACUITY_SCORE: 22
TRANSFERRING: 3-->ASSISTIVE EQUIPMENT AND PERSON
ADLS_ACUITY_SCORE: 22
TOILETING: 3-->ASSISTIVE EQUIPMENT AND PERSON

## 2020-06-04 ASSESSMENT — MIFFLIN-ST. JEOR: SCORE: 1540.63

## 2020-06-04 NOTE — PROGRESS NOTES
[facility]  Patient is currently open to home care services with [facility]. The patient is currently receiving Skilled Nursing services. [facility]  and team have been notified of patient admission. [facility] liaison will continue to follow patient during stay. If appropriate provide orders to resume home care at time of discharge.

## 2020-06-04 NOTE — H&P
[facility]    History and Physical - Hospitalist Service       Date of Admission: 6/3/2020    Assessment & Plan   Bhavani White is a 64 year old woman with extensive surgical PMH significant for severe morbid obesity (BMI near 60), PUD, chronic pain on Methadone, ANIYA, neurogenic bladder due to spinal stenosis, hypothyroid, NIDDM2, asthma, HTN, and dyslipidemia who underwent emergent repair of perforated duodenal ulcer 12/29/2019 with subsequent prolonged hospitalizations for intra-abdominal infections requiring exploratory laparotomy, TRUONG, drain placement with perforation, and wound vac who was readmitted 3/13/20-3/16/20 for sepsis and intra-abdominal infections and abdominal wall cellulitis.     She presented to the emergency department at [facility] on 6/3/20 with complaints of 3-day history of nausea, vomiting, abdominal pain was found to have a proximal small bowel obstruction and subsequently transferred to [facility] for surgical consultation and further management.     Proximal small bowel obstruction  Presented with 3-day history of nausea, vomiting, abdominal pain. Last bowel movement x3 days PTA. Denies fevers or chills. States abdominal pain has worsened, it is described as severe diffuse abdomen but worse at the epigastrum and upper quadrants. No flatus. No hematochezia or hematemesis. CT with contrast of abdomen and pelvis completed showing small bowel obstruction involving the proximal jejunum with transition point in the midline upper abdomen.  Upper abdominal ascites slightly improved since prior exam. On exam, diffusely tender, +guarding. Hypoactive bowel sounds. NGT placed in ED with 1500ml green/yellow output.   - Inpatient status  - General Surgery consulted - no need for emergent surgical intervention  - NPO, MIVF  - NGT placed in ED, LIS (1500mL yellow/green output)  - No antibiotics currently  - IV PPI    Mild hypoxia on 2L NC   History of Hypoxia 3/2020  admission  CT scans March 2020 admission showed new or worsening bilateral pleural effusions which were attributed to possible acute systolic or diastolic CHF exacerbation: noting that an initial TTE from December 2019 had shown reduced LVEF in the setting of critical illness, improved on repeat TTE 1/14/2020 to normalized LVEF. She has been on lasix daily at home. Repeat TTE March 2020 EF 60-65%, grade II or moderate diastolic dysfunction.  - Aggressive IS  - Monitor O2 saturations  - Monitor fluid status, may need diuresis if appears fluid overloaded from IVF    Hypercalcemia  May be related to dehydration. Though has had elevated levels in the past. Received 1L NS in the ED.  - Monitor  - MIVF  - If continues would pursue workup     Recent suspected abdominal cellulitis 3/2020  Recent intra-abdominal infections 3/2020  March admission, presented with new onset purulent drainage from several wound sites and burning epigastric discomfort. She had fever, tachycardia, leukocytosis and thrombocytosis. CT showed new or ongoing intra-abdominal fluid collections thought to be staphyloccocal cellulitis and possibly also polymicrobial  intra-abdominal abscesses.     Chronic Anemia  Hgb stable. Above baseline on admission likely due to dehydration.  - Monitor closely while hospitalized    Recent Labs   Lab 06/03/20  1442   HGB 13.4        ANIYA: It seems she may be intermittently adherent to CPAP.     Chronic pain   Neurogenic bladder with chronic suprapubic catheter  It seems she has severe spinal stenosis and takes Methadone as an outpatient. She was reportedly wheelchair bound prior to 12/2019, and has had a supra-pubic catheter in place for chronic retention.  [PTA Cymbalta 120 mg daily, gabapentin 1600 mg 3 times daily, methadone 20 mg twice daily, trazodone 100 mg nightly, hydroxyzine 50 mg twice daily as needed, tizanidine 4 mg 3 times daily as needed]  - Hold PTA regimen of Cymbalta, trazodone and Neurontin while  NPO  - Pain consultation give methadone use and current SBP  - Suprapubic catheter in place     Non insulin type 2 diabetes mellitus  Most recent A1c 7.1% in 1/2020.  PTA Regimen: metformin  mg twice daily  - Hypoglycemia protocol, Q 4 hours while NPO  - Sliding scale insulin Medium  - Recheck HbA1c    Recent Labs   Lab 06/03/20  2212 06/03/20  1442   GLC  --  151*   *  --         Asthma: Resume home inhalers     Hypertension  Dyslipidemia  [PTA amlodipine 5 mg daily, lisinopril 5 mg daily, Toprol-XL 12.5 mg daily]  - Hold PTA antihypertensives while n.p.o. with NG tube.    - PRN IV metoprolol for heart rate greater than 120 and PRN hydralazine for SBP greater than 180 available     Hypothyroid  [PTA levothyroxine 88 mcg daily]  - Hold PTA Synthroid while n.p.o.    Diet: NPO  DVT Prophylaxis: Pneumatic Compression Devices  Lundberg Catheter: in place, indication:    Code Status: Full code, discussed on admission with patient    Disposition Plan   Expected discharge: 4 - 7 days, recommended to Home versus TCU once adequate pain management/ tolerating PO medications, safe disposition plan/ TCU bed available and Clinically improved and cleared by general surgery.  Entered: Kimberly Rubio PA-C 06/03/2020, 11:00 PM     The patient's care was discussed with the Attending Physician, Dr. Hart, Bedside Nurse, Patient and General surgery Consultant.    Kimberly Rubio PA-C ([place])  Hospitalist [facility]  [facility]    ______________________________________________________________________    Chief Complaint   Nausea, vomiting, abdominal pain    History is obtained from the patient, electronic health record and emergency department physician    History of Present Illness   Bhavani White is a 64 year old woman with extensive surgical PMH significant for severe morbid obesity (BMI near 60), PUD, chronic pain on Methadone, ANIYA, neurogenic bladder due to spinal stenosis, hypothyroid, NIDDM2,  asthma, HTN, and dyslipidemia who underwent emergent repair of perforated duodenal ulcer 12/29/2019 with subsequent prolonged hospitalizations for intra-abdominal infections requiring exploratory laparotomy, TRUONG, drain placement with perforation, and wound vac who was readmitted 3/13/20-3/16/20 for sepsis and intra-abdominal infections and abdominal wall cellulitis. Today she presented to the emergency department with complaints 3-day history of nausea, vomiting, abdominal pain.  Last bowel movement x3 days PTA.  Denies fevers or chills.  States abdominal pain has worsened, it is described as severe diffuse abdomen but worse at the epigastrum and upper quadrants. No flatus. No hematochezia or hematemesis.     In the ER, patient was hemodynamically stable though hypertensive and mildly hypoxic to 88%. She was started on 2 L of nasal cannula. Basic labs reveal CO2 35, calcium 11, liver tests within normal limits. Creatinine stable. Lactic acid 1.3 which is reassuring.  Lipase low at 34. Troponin negative.  Glucose 151. She leukocytosis with white blood cells 15.3. Hemoglobin 13.4.  CT with contrast of abdomen and pelvis completed showing small bowel obstruction involving the proximal jejunum with transition point in the midline upper abdomen.  Upper abdominal ascites slightly improved since prior exam.  Cholelithiasis and probable cirrhotic liver morphology.  Stable left renal cortical cysts. NGT placed in ED with 1500ml green/yellow output. In the ER she was given 1 L bolus of normal saline, pain control with IV Dilaudid, and 1 dose of IV Zofran.      Review of Systems    The 10 point Review of Systems is negative other than noted in the HPI or here.     Past Medical History    I have reviewed this patient's medical history and updated it with pertinent information if needed.   Past Medical History:   Diagnosis Date     Anxiety      Asthma      Depression      DM (diabetes mellitus) (H) 2003     Frequent UTI       History of blood transfusion      History of ulcer disease 2014    She notes from NSAIDs; nearly . Discussed a hospitalization at [place] for this.     Hypertension      Hypothyroid      Iatrogenic Cushing's disease (H)     off prednisone now     Infection due to alpha-hemolytic Streptococcus 2020     Morbid obesity (H)      MRSA (methicillin resistant staph aureus) culture positive 2012    repeat cx 10/12/2012 no staph mentioned.     Osteoarthritis     multiple joings.     Other chronic pain      Sleep apnea     No longer uses cpap.       Past Surgical History   I have reviewed this patient's surgical history and updated it with pertinent information if needed.  Past Surgical History:   Procedure Laterality Date     ABDOMEN SURGERY       APPLY WOUND VAC  2019    Procedure: Apply Wound Vac;  Surgeon: Ayan Lopez MD;  Location: [facility] OR     C LAMINECTOMY,FACETECTOMY,LUMBAR  1982     C TOTAL KNEE ARTHROPLASTY      left     CLOSE SECONDARY WOUND ABDOMEN N/A 2020    Procedure: SECONDARY ABDOMINAL WOUND CLOSURE;  Surgeon: Ayan Lopez MD;  Location: [facility] OR     CYSTOSCOPY N/A 2018    Procedure: CYSTOSCOPY;  Cystoscopy and Botox Injection Into the Bladder and suprapubic tube exchange;  Surgeon: Yoandy Rios MD;  Location: [facility]     CYSTOSCOPY N/A 3/15/2019    Procedure: Cystoscopy, suprapubic tube exchange;  Surgeon: Yoandy Rios MD;  Location: [facility]     CYSTOSCOPY FLEXIBLE, CYSTOSTOMY, INSERT TUBE SUPRAPUBIC, COMBINED N/A 2019    Procedure: Cystoscopy, Bladder Botox Injection, Suprapubic Tube Change;  Surgeon: Yoandy Rios MD;  Location: [facility]     CYSTOSCOPY, INTRAVESICAL INJECTION N/A 2017    Procedure: CYSTOSCOPY, INTRAVESICAL INJECTION;  Cystoscopy, Botox Injection Into The Bladder  Latex Allergy ;  Surgeon: Yoandy Rios MD;  Location: [facility] OR     CYSTOSCOPY, INTRAVESICAL INJECTION N/A 3/8/2018    Procedure: CYSTOSCOPY,  INTRAVESICAL INJECTION;  Cystoscopy, Botox Injection Into The Bladder and suprapubic catheter exchange;  Surgeon: Yoandy Rios MD;  Location: [facility] OR     DISCECTOMY, FUSION CERVICAL ANTERIOR THREE+ LEVELS, COMBINED Left 5/3/2016    Procedure: COMBINED DISCECTOMY, FUSION CERVICAL ANTERIOR THREE+ LEVELS;  Surgeon: Jasvir Torres MD;  Location: [facility] OR     GASTROSTOMY, INSERT TUBE, COMBINED N/A 1/1/2020    Procedure: GASTRIC-JEJUNAL FEEDING TUBE INSERTION;  Surgeon: Ayan Lopez MD;  Location:  OR     GENITOURINARY SURGERY      suprapubic catheter     HERNIA REPAIR  1957    double hernia     HYSTERECTOMY, PAP NO LONGER INDICATED      CELIA and large ovarian tumor removed     INJECT BOTOX N/A 9/21/2018    Procedure: INJECT BOTOX;;  Surgeon: Yoandy Rios MD;  [facility]     INJECT BOTOX N/A 3/15/2019    Procedure: Inject Botox into Bladder;  Surgeon: Yoandy Rios MD;  [facility]     IR JEJUNOSTOMY TUBE CHANGE  2/4/2020     IR JEJUNOSTOMY TUBE CHANGE  2/19/2020     LAPAROSCOPY DIAGNOSTIC (GENERAL) N/A 12/30/2019    Procedure: Laparoscopy diagnostic (general);  Surgeon: Ayan Lopez MD;  Location:  OR     LAPAROTOMY EXPLORATORY N/A 1/1/2020    Procedure: EXPLORATORY LAPAROTOMY;  Surgeon: Ayan Lopez MD;  Location:  OR     LAPAROTOMY EXPLORATORY N/A 12/30/2019    Procedure: LAPAROTOMY, REPAIR OF ULCER PERFORATION;  Surgeon: Ayan Lopez MD;  Location:  OR     SURGICAL HISTORY OF -       left cataract surgery     SURGICAL HISTORY OF -   12/14    right cataract surgery and left laser revision     SURGICAL HISTORY OF -   March 2015    left carpal tunnel release     TONSILLECTOMY  1974       Social History   I have reviewed this patient's social history and updated it with pertinent information if needed.  Social History     Tobacco Use     Smoking status: Never Smoker     Smokeless tobacco: Never Used   Substance Use Topics     Alcohol use:  No     Frequency: Never     Drinks per session: Patient refused     Binge frequency: Patient refused     Drug use: Yes     Types: Marijuana     Comment: Medical canibis       Family History   I have reviewed this patient's family history and updated it with pertinent information if needed.   Family History   Problem Relation Age of Onset     Depression Son      Hypertension Mother      Heart Disease Mother         bypass     Depression Mother      Hypertension Father      Connective Tissue Disorder Father         ankylosing spondylitis     Cancer Father         colon; prostate     Other Cancer Father         bladder cancer     Depression Sister        Prior to Admission Medications   Prior to Admission Medications   Prescriptions Last Dose Informant Patient Reported? Taking?   DULoxetine (CYMBALTA) 60 MG capsule 3 days ago Self Yes Yes   Sig: Take 120 mg by mouth every morning   VENTOLIN  (90 Base) MCG/ACT inhaler prn Self No No   Sig: INHALE 2 PUFFS INTO THE LUNGS EVERY 6 HOURS AS NEEDED FOR SHORTNESS OF BREATH   Patient taking differently: Inhale 2 puffs into the lungs every 6 hours as needed for shortness of breath / dyspnea    amLODIPine (NORVASC) 5 MG tablet 3 days ago Self No Yes   Si tablet (5 mg) by Oral or Feeding Tube route daily   bisacodyl (DULCOLAX) 10 MG suppository prn Self Yes Yes   Sig: Place 10 mg rectally 2 times daily as needed for constipation   docusate sodium (COLACE) 100 MG capsule 3 days ago Self No Yes   Sig: Take 2 capsules (200 mg) by mouth 2 times daily   gabapentin (NEURONTIN) 800 MG tablet 3 days ago Self Yes Yes   Sig: Take 1,600 mg by mouth 3 times daily 0800, 1200 and 1800   hydrOXYzine (ATARAX) 50 MG tablet prn Self Yes No   Si mg 2 times daily as needed    hypromellose-dextran (ARTIFICAL TEARS) 0.1-0.3 % ophthalmic solution prn Self No Yes   Sig: Place 1 drop into both eyes 3 times daily as needed for dry eyes   ipratropium - albuterol 0.5 mg/2.5 mg/3 mL (DUONEB)  0.5-2.5 (3) MG/3ML neb solution prn Self Yes No   Sig: Take 1 vial by nebulization every 6 hours as needed for shortness of breath / dyspnea or wheezing   levothyroxine (SYNTHROID/LEVOTHROID) 88 MCG tablet 3 days ago Self No Yes   Sig: TAKE 1 TABLET(88 MCG) BY MOUTH DAILY   Patient taking differently: Take 88 mcg by mouth every morning (before breakfast)    lisinopril (ZESTRIL) 5 MG tablet 3 dyas ago Self Yes Yes   Sig: Take 5 mg by mouth daily    metFORMIN (GLUCOPHAGE-XR) 500 MG 24 hr tablet 3 days ago Self Yes No   Sig: Take 500 mg by mouth 2 times daily (with meals)    methadone (DOLOPHINE) 10 MG tablet 6/3/2020 at am Self No Yes   Sig: Take 2 tablets (20 mg) by mouth 2 times daily   metoprolol succinate ER (TOPROL-XL) 25 MG 24 hr tablet 3 days ago Self No Yes   Sig: Take 0.5 tablets (12.5 mg) by mouth daily   miconazole (MICATIN/MICRO GUARD) 2 % external powder prn Self No No   Sig: Apply topically 2 times daily as needed for other (topical candidiasis)   ondansetron (ZOFRAN-ODT) 4 MG ODT tab prn Self No No   Sig: Take 1 tablet (4 mg) by mouth every 6 hours as needed for nausea or vomiting   order for DME  Self No No   Sig: Equipment being ordered: Hospital Bed, total electric (head, foot, and height adjustments), with any type side rails, with mattress   order for DME  Self No No   Sig: Equipment being ordered: Motorized Wheelchair   order for DME  Self No No   Sig: Equipment being ordered: Trapeze bar for hospital bed   order for DME  Self No No   Sig: Equipment being ordered: Nebulizer   order for DME  Self No No   Sig: Equipment being ordered: Nebulizer   oxybutynin ER (DITROPAN XL) 15 MG 24 hr tablet 3 days ago Self Yes No   Sig: Take 15 mg by mouth daily    pantoprazole (PROTONIX) 40 MG EC tablet 3 days ago Self No Yes   Sig: Take 1 tablet (40 mg) by mouth daily   polyethylene glycol (MIRALAX/GLYCOLAX) packet prn Self No No   Sig: Take 17 g by mouth 2 times daily as needed (constipation)   rosuvastatin  "(CRESTOR) 5 MG tablet 3 days ago Self No Yes   Sig: Take 1 tablet (5 mg) by mouth At Bedtime   senna-docusate (SENOKOT-S;PERICOLACE) 8.6-50 MG per tablet 3 days ago Self No Yes   Sig: Take 2 tablets by mouth 2 times daily   tiZANidine (ZANAFLEX) 4 MG tablet prn Self Yes No   Sig: Take 4 mg by mouth 3 times daily as needed for muscle spasms    traZODone (DESYREL) 100 MG tablet 3 days ago Self Yes Yes   Sig: Take 100 mg by mouth At Bedtime      Facility-Administered Medications: None     Allergies   Allergies   Allergen Reactions     Povidone Iodine      \"mild skin irritation\" per patient report     Toradol [Ketorolac] Other (See Comments)     Pt gets nightmares     Tramadol Other (See Comments)       Physical Exam   Vital Signs: Temp: 99.3  F (37.4  C) Temp src: Oral BP: 137/76   Heart Rate: 83 Resp: 16   O2 Device: Nasal cannula Oxygen Delivery: 2 LPM  Weight: 208 lbs 0 oz    General: Awake, alert, older woman who appears stated age. Looks uncomfortable and in moderate distress due to pain.  HEENT: Normocephalic, atraumatic. Extraocular movements intact.   Respiratory: Clear to auscultation bilaterally, no rales, wheezing, or rhonchi.  Cardiovascular: Regular rate and rhythm, +S1 and S2, no murmur auscultated. No peripheral edema.   Gastrointestinal: Soft, +guarding, very TTP with minimal palpation. Decreased bowel sounds. Midline healed surgical incisions. NGT in place draining greenish output. Suprapubic catheter in place.  Skin: Warm, dry. No obvious rashes or lesions on exposed skin. Dorsalis pedis pulses palpable bilaterally.  Musculoskeletal: No joint swelling, erythema or tenderness. Moves all extremities equally. LLE contracted with decreased muscle mass. No peripheral edema.  Neurologic: AAO x3. Cranial nerves 2-12 grossly intact, normal strength and sensation.  Psychiatric: Appropriate mood and affect. No obvious anxiety or depression.    Data   Data reviewed today: I reviewed all medications, new labs and " imaging results over the last 24 hours. See HPI.    Recent Labs   Lab 06/03/20  1442   WBC 15.3*   HGB 13.4   MCV 74*         POTASSIUM 3.7   CHLORIDE 95   CO2 35*   BUN 19   CR 0.68   ANIONGAP 6   DORY 11.0*   *   ALBUMIN 4.0   PROTTOTAL 9.3*   BILITOTAL 0.4   ALKPHOS 107   ALT 24   AST 20   LIPASE 34*   TROPI <0.015     Recent Results (from the past 24 hour(s))   Abd/pelvis CT,  IV  contrast only TRAUMA / AAA    Narrative    CT ABDOMEN PELVIS WITH CONTRAST 6/3/2020 4:02 PM    CLINICAL HISTORY: Severe abdominal pain, history of perforated ulcer,  vomiting, evaluate repeat perforation vs small-bowel obstruction  (SBO).    TECHNIQUE: CT scan of the abdomen and pelvis was performed following  injection of IV contrast. Multiplanar reformats were obtained. Dose  reduction techniques were used.  CONTRAST: 100mL Isovue-370    COMPARISON: CT chest abdomen pelvis 3/13/2020.    FINDINGS:   LOWER CHEST: Mild atelectasis right lung base with no significant  pleural fluid.    HEPATOBILIARY: Nodular-appearing liver contour is unchanged. Chronic  liver disease or cirrhosis is possible. Calcified gallstones are noted  in the gallbladder.    PANCREAS: Atrophic-appearing but without surrounding inflammation.    SPLEEN: Normal.    ADRENAL GLANDS: Normal.    KIDNEYS/BLADDER: Left renal cortical cysts are unchanged. Right kidney  is unremarkable. No hydronephrosis or urinary tract calculi. Bladder  is decompressed with a suprapubic catheter, unchanged.    BOWEL: Proximal small-bowel obstruction is noted with fluid and air  distention of the stomach, duodenum and proximal jejunum. Transition  point is on series 2, image 58. The more distal small bowel and colon  are decompressed. Mild colonic constipation is noted.    LYMPH NODES: No enlarged abdominal or pelvic lymph nodes.    VASCULATURE: Unremarkable.    PELVIC ORGANS: Uterus not identified. No adnexal mass or free pelvic  fluid.    ADDITIONAL FINDINGS: Perihepatic  ascites is again noted, slightly  improved since prior exam. Prior pleural effusions have also resolved.  Minimal perisplenic fluid remains. No free intraperitoneal air.  Anterior abdominal wall gastrostomy tube has been removed.    MUSCULOSKELETAL: Degenerative spine changes are present. Bones are  osteopenic. Apparent loss of disc space in several of the lumbar disc  levels possibly representing fusion. No hardware is noted.      Impression    IMPRESSION:   1.  Small-bowel obstruction involving the proximal jejunum with  transition point in the midline upper abdomen.  2.  Cholelithiasis and probable cirrhotic liver morphology. Spleen is  normal in size.  3.  Upper abdominal ascites has slightly improved since prior exam.  4.  Stable left renal cortical cysts. Pubic catheter is again noted in  the bladder. Gastrostomy tube has been removed.    LEXIS OSORIO MD

## 2020-06-04 NOTE — PHARMACY-ADMISSION MEDICATION HISTORY
"Pharmacy Medication History  Admission medication history interview status for the 6/3/2020  admission is complete. See EPIC admission navigator for prior to admission medications     Medication history sources: Patient and Surescripts  Medication history source reliability: Good  Adherence assessment: patient has been sick so could not tgakes meds for 3 days.    Significant changes made to the medication list:  D/C'd Potassium and duplicate Metformin and Oxybutynin      Additional medication history information:   n one    Medication reconciliation completed by provider prior to medication history? No    Time spent in this activity: 25\"      Prior to Admission medications    Medication Sig Last Dose Taking? Auth Provider   amLODIPine (NORVASC) 5 MG tablet 1 tablet (5 mg) by Oral or Feeding Tube route daily 3 days ago Yes Skylar Moore MD   bisacodyl (DULCOLAX) 10 MG suppository Place 10 mg rectally 2 times daily as needed for constipation prn Yes Unknown, Entered By History   docusate sodium (COLACE) 100 MG capsule Take 2 capsules (200 mg) by mouth 2 times daily 3 days ago Yes Khurram Livingston MD   DULoxetine (CYMBALTA) 60 MG capsule Take 120 mg by mouth every morning 3 days ago Yes Unknown, Entered By History   gabapentin (NEURONTIN) 800 MG tablet Take 1,600 mg by mouth 3 times daily 0800, 1200 and 1800 3 days ago Yes Unknown, Entered By History   hypromellose-dextran (ARTIFICAL TEARS) 0.1-0.3 % ophthalmic solution Place 1 drop into both eyes 3 times daily as needed for dry eyes prn Yes Khurram Livingston MD   levothyroxine (SYNTHROID/LEVOTHROID) 88 MCG tablet TAKE 1 TABLET(88 MCG) BY MOUTH DAILY  Patient taking differently: Take 88 mcg by mouth every morning (before breakfast)  3 days ago Yes Shawnee Dueñas DO   lisinopril (ZESTRIL) 5 MG tablet Take 5 mg by mouth daily  3 dyas ago Yes Reported, Patient   methadone (DOLOPHINE) 10 MG tablet Take 2 tablets (20 mg) by mouth 2 times " daily 6/3/2020 at am Yes Khurram Livingston MD   metoprolol succinate ER (TOPROL-XL) 25 MG 24 hr tablet Take 0.5 tablets (12.5 mg) by mouth daily 3 days ago Yes Clemencia Decker APRN CNP   pantoprazole (PROTONIX) 40 MG EC tablet Take 1 tablet (40 mg) by mouth daily 3 days ago Yes Skylar Moore MD   rosuvastatin (CRESTOR) 5 MG tablet Take 1 tablet (5 mg) by mouth At Bedtime 3 days ago Yes Skylar Moore MD   senna-docusate (SENOKOT-S;PERICOLACE) 8.6-50 MG per tablet Take 2 tablets by mouth 2 times daily 3 days ago Yes Rosa Summers APRN CNP   traZODone (DESYREL) 100 MG tablet Take 100 mg by mouth At Bedtime 3 days ago Yes Unknown, Entered By History   hydrOXYzine (ATARAX) 50 MG tablet 50 mg 2 times daily as needed  prn  Reported, Patient   ipratropium - albuterol 0.5 mg/2.5 mg/3 mL (DUONEB) 0.5-2.5 (3) MG/3ML neb solution Take 1 vial by nebulization every 6 hours as needed for shortness of breath / dyspnea or wheezing prn  Unknown, Entered By History   metFORMIN (GLUCOPHAGE-XR) 500 MG 24 hr tablet Take 500 mg by mouth 2 times daily (with meals)  3 days ago  Reported, Patient   miconazole (MICATIN/MICRO GUARD) 2 % external powder Apply topically 2 times daily as needed for other (topical candidiasis) prn  Khurram Livingston MD   ondansetron (ZOFRAN-ODT) 4 MG ODT tab Take 1 tablet (4 mg) by mouth every 6 hours as needed for nausea or vomiting prn  Grisel Chicas MD   order for DME Equipment being ordered: Nebulizer   Shawnee Dueñas DO   order for DME Equipment being ordered: Nebulizer   Skylar Moore MD   order for DME Equipment being ordered: Hospital Bed, total electric (head, foot, and height adjustments), with any type side rails, with mattress   Shiro, Shawnee Natasha, DO   order for DME Equipment being ordered: Motorized Wheelchair   Shawnee Dueñas,    order for DME Equipment being ordered: Trapeze bar for hospital bed   Shawnee Dueñas,     oxybutynin ER (DITROPAN XL) 15 MG 24 hr tablet Take 15 mg by mouth daily  3 days ago  Reported, Patient   polyethylene glycol (MIRALAX/GLYCOLAX) packet Take 17 g by mouth 2 times daily as needed (constipation) prn  Khurram Livingston MD   tiZANidine (ZANAFLEX) 4 MG tablet Take 4 mg by mouth 3 times daily as needed for muscle spasms  prn  Reported, Patient   VENTOLIN  (90 Base) MCG/ACT inhaler INHALE 2 PUFFS INTO THE LUNGS EVERY 6 HOURS AS NEEDED FOR SHORTNESS OF BREATH  Patient taking differently: Inhale 2 puffs into the lungs every 6 hours as needed for shortness of breath / dyspnea  prn  Skylar Moore MD

## 2020-06-04 NOTE — ED NOTES
With pt's permission, called  to update on pt's care plan and transfer to [facility]. All questions answered at this time. Provided  with [address] phone number to contact when pt arrives.

## 2020-06-04 NOTE — PLAN OF CARE
Patient arrived form [facility] via transport. A&Ox4, turn every 2 hours, pain 8/10 abd and IV dilaudid was given, applied warm packs to abd. NG in place and to  ml output of green thick drainage. Suprapubic cath in place and cath cares were done. Skin is intact. Patient refuses to remove urine soiled  underwear. MRSA isolation and patient is also r/o COVID for possible surgery. Patient is wheelchair bound at home. She pivots to the wheelchair able to bear wt on right leg states the patient. Her  helps care for her at home.  Surgery consult was done and medical mgmt for now.

## 2020-06-04 NOTE — CONSULTS
[facility] General Surgery Consultation    Bhavani White MRN# [id number]   YOB: 1955 Age: 64 year old      Date of Admission:  6/3/2020  Date of Consult: 6/3/2020         Assessment and Plan:   Patient is a 64 year old female with abdominal pain and evidence of proximal small bowel obstruction on CT scan    PLAN:  -Medical management per primary team  -N.p.o. and IV fluids  -NG to low intermittent suction  -No indication for emergent surgical intervention  -Surgery to follow         Requesting Physician:      MYRON Worthington        Chief Complaint:   Abdominal pain         History of Present Illness:   Bhavani White is a 64 year old female who was seen in consultation at the request of MYRON Worthington who presented with abdominal pain.  The patient reports that she began experiencing nausea and vomiting starting 3 days ago.  After the nausea and vomiting started, the patient started to notice abdominal pain.  The pain extends across her upper abdomen, from the right upper quadrant across to the midepigastric area.  Patient reports she is not passing any flatus.  Her last bowel movement was 3 days ago.  She denies any recent hematochezia.  She has not been having any fevers or chills.  Due to the severity of her abdominal symptoms, the patient presented to [facility] for further evaluation.  An NG tube was placed with 1500 mL of returns.  Patient does report that her abdominal pain improved following the NG tube placement.  She was subsequently transferred to [facility] for further evaluation and management.  Patient does have a significant surgical history.  On December 29, 2019, the patient underwent exploratory laparotomy with lysis of adhesions.  She was found to have distal gastric and duodenal ischemia with resulting duodenal perforation.  A drain was placed into her region of duodenal perforation and a temporary abdominal closure was performed.  On January 1, 2020, the  "patient underwent repeat exploratory laparotomy with lysis of adhesions.  Her area of duodenal perforation was closed down and her duodenal drain was replaced.  She also underwent placement of gastrostomy and jejunostomy tubes.  Her abdominal wall was then primarily closed.  The patient was also admitted in 2020 due to concerns for abdominal wall cellulitis and intra-abdominal infection.  Her previously placed abdominal drains have all since been removed.            Physical Exam:   Blood pressure 137/76, temperature 99.3  F (37.4  C), temperature source Oral, resp. rate 16, height 1.702 m (5' 7\"), weight 94.3 kg (208 lb), not currently breastfeeding.  208 lbs 0 oz  General: Vital signs reviewed, in no apparent distress  Eyes: Anicteric  HENT: Normocephalic, atraumatic, trachea midline   Respiratory: Breathing nonlabored  Cardiovascular: Regular rate and rhythm  GI: Abdomen soft, slightly distended, tender with palpation across upper abdomen, no evidence of peritonitis or guarding  Musculoskeletal: No gross deformities  Neurologic: Grossly nonfocal exam  Psychiatric: Normal mood, affect and insight  Integumentary: Warm and dry         Past Medical History:     Past Medical History:   Diagnosis Date     Anxiety      Asthma      Depression      DM (diabetes mellitus) (H)      Frequent UTI      History of blood transfusion      History of ulcer disease 2014    She notes from NSAIDs; nearly . Discussed a hospitalization at [facility] for this.     Hypertension      Hypothyroid      Iatrogenic Cushing's disease (H)     off prednisone now     Infection due to alpha-hemolytic Streptococcus 2020     Morbid obesity (H)      MRSA (methicillin resistant staph aureus) culture positive 2012    repeat cx 10/12/2012 no staph mentioned.     Osteoarthritis     multiple joings.     Other chronic pain      Sleep apnea     No longer uses cpap.            Past Surgical History:     Past Surgical History: "   Procedure Laterality Date     ABDOMEN SURGERY       APPLY WOUND VAC  12/30/2019    Procedure: Apply Wound Vac;  Surgeon: Ayan Lopez MD;  Location: [facility] OR     C LAMINECTOMY,FACETECTOMY,LUMBAR  1982     C TOTAL KNEE ARTHROPLASTY  1999    left     CLOSE SECONDARY WOUND ABDOMEN N/A 1/1/2020    Procedure: SECONDARY ABDOMINAL WOUND CLOSURE;  Surgeon: Ayan Lopez MD;  Location: [facility] OR     CYSTOSCOPY N/A 9/21/2018    Procedure: CYSTOSCOPY;  Cystoscopy and Botox Injection Into the Bladder and suprapubic tube exchange;  Surgeon: Yoandy Rios MD;  [facility]     CYSTOSCOPY N/A 3/15/2019    Procedure: Cystoscopy, suprapubic tube exchange;  Surgeon: Yoandy Rios MD;  [facility]     CYSTOSCOPY FLEXIBLE, CYSTOSTOMY, INSERT TUBE SUPRAPUBIC, COMBINED N/A 11/1/2019    Procedure: Cystoscopy, Bladder Botox Injection, Suprapubic Tube Change;  Surgeon: Yoandy Rios MD;  [facility]     CYSTOSCOPY, INTRAVESICAL INJECTION N/A 8/23/2017    Procedure: CYSTOSCOPY, INTRAVESICAL INJECTION;  Cystoscopy, Botox Injection Into The Bladder  Latex Allergy ;  Surgeon: Yoandy Rios MD;  [facility]     CYSTOSCOPY, INTRAVESICAL INJECTION N/A 3/8/2018    Procedure: CYSTOSCOPY, INTRAVESICAL INJECTION;  Cystoscopy, Botox Injection Into The Bladder and suprapubic catheter exchange;  Surgeon: Yoandy Rios MD;  Location: [facility] OR     DISCECTOMY, FUSION CERVICAL ANTERIOR THREE+ LEVELS, COMBINED Left 5/3/2016    Procedure: COMBINED DISCECTOMY, FUSION CERVICAL ANTERIOR THREE+ LEVELS;  Surgeon: Jasvir Torres MD;  Location: [facility] OR     GASTROSTOMY, INSERT TUBE, COMBINED N/A 1/1/2020    Procedure: GASTRIC-JEJUNAL FEEDING TUBE INSERTION;  Surgeon: Ayan Lopez MD;  Location:  OR     GENITOURINARY SURGERY      suprapubic catheter     HERNIA REPAIR  1957    double hernia     HYSTERECTOMY, PAP NO LONGER INDICATED      CELIA and large ovarian tumor removed     INJECT  BOTOX N/A 9/21/2018    Procedure: INJECT BOTOX;;  Surgeon: Yoandy Rios MD;  [facility]     INJECT BOTOX N/A 3/15/2019    Procedure: Inject Botox into Bladder;  Surgeon: Yoandy Rios MD;  [facility]     IR JEJUNOSTOMY TUBE CHANGE  2/4/2020     IR JEJUNOSTOMY TUBE CHANGE  2/19/2020     LAPAROSCOPY DIAGNOSTIC (GENERAL) N/A 12/30/2019    Procedure: Laparoscopy diagnostic (general);  Surgeon: Ayan Lopez MD;  Location: [facility]     LAPAROTOMY EXPLORATORY N/A 1/1/2020    Procedure: EXPLORATORY LAPAROTOMY;  Surgeon: Ayan Lopez MD;  [facility]: [facility]     LAPAROTOMY EXPLORATORY N/A 12/30/2019    Procedure: LAPAROTOMY, REPAIR OF ULCER PERFORATION;  Surgeon: Ayan Lopez MD;  Location: [facility]     SURGICAL HISTORY OF -       left cataract surgery     SURGICAL HISTORY OF -   12/14    right cataract surgery and left laser revision     SURGICAL HISTORY OF -   March 2015    left carpal tunnel release     TONSILLECTOMY  1974            Current Medications:           insulin aspart  1-6 Units Subcutaneous Q4H     sodium chloride (PF)  3 mL Intracatheter Q8H       acetaminophen, acetaminophen, glucose **OR** dextrose **OR** glucagon, HYDROmorphone, lidocaine 4%, lidocaine (buffered or not buffered), magnesium sulfate, melatonin, naloxone, ondansetron **OR** ondansetron, potassium chloride, potassium chloride with lidocaine, potassium chloride, potassium chloride, potassium chloride, prochlorperazine **OR** prochlorperazine **OR** prochlorperazine, sodium chloride (PF)         Home Medications:     Prior to Admission medications    Medication Sig Last Dose Taking? Auth Provider   amLODIPine (NORVASC) 5 MG tablet 1 tablet (5 mg) by Oral or Feeding Tube route daily   Skylar Moore MD   bisacodyl (DULCOLAX) 10 MG suppository Place 10 mg rectally 2 times daily as needed for constipation   Unknown, Entered By History   docusate sodium (COLACE) 100 MG capsule Take 2  capsules (200 mg) by mouth 2 times daily   Khurram Livingston MD   DULoxetine (CYMBALTA) 60 MG capsule Take 120 mg by mouth every morning   Unknown, Entered By History   gabapentin (NEURONTIN) 800 MG tablet Take 1,600 mg by mouth 3 times daily 0800, 1200 and 1800   Unknown, Entered By History   hydrOXYzine (ATARAX) 50 MG tablet TK 1 T PO BID PRA OR INSOMNIA   Reported, Patient   hypromellose-dextran (ARTIFICAL TEARS) 0.1-0.3 % ophthalmic solution Place 1 drop into both eyes 3 times daily as needed for dry eyes   Khurram Livingston MD   ipratropium - albuterol 0.5 mg/2.5 mg/3 mL (DUONEB) 0.5-2.5 (3) MG/3ML neb solution Take 1 vial by nebulization every 6 hours as needed for shortness of breath / dyspnea or wheezing   Unknown, Entered By History   levothyroxine (SYNTHROID/LEVOTHROID) 88 MCG tablet TAKE 1 TABLET(88 MCG) BY MOUTH DAILY   Shawnee Dueñas DO   lisinopril (ZESTRIL) 5 MG tablet    Reported, Patient   metFORMIN (GLUCOPHAGE) 500 MG tablet Take 500 mg by mouth 2 times daily (with meals)   Reported, Patient   metFORMIN (GLUCOPHAGE-XR) 500 MG 24 hr tablet TK 1 T PO BID WC   Reported, Patient   methadone (DOLOPHINE) 10 MG tablet Take 2 tablets (20 mg) by mouth 2 times daily   Khurram Livingston MD   metoprolol succinate ER (TOPROL-XL) 25 MG 24 hr tablet Take 0.5 tablets (12.5 mg) by mouth daily   Clemencia Decker APRN CNP   miconazole (MICATIN/MICRO GUARD) 2 % external powder Apply topically 2 times daily as needed for other (topical candidiasis)   Khurram Livingston MD   ondansetron (ZOFRAN-ODT) 4 MG ODT tab Take 1 tablet (4 mg) by mouth every 6 hours as needed for nausea or vomiting   Grisel Chicas MD   order for DME Equipment being ordered: Nebulizer   Shawnee Dueñas DO   order for DME Equipment being ordered: Nebulizer   Skylar Moore MD   order for DME Equipment being ordered: Hospital Bed, total electric (head, foot, and height adjustments), with any type  "side rails, with mattress   Shawnee Dueñas,    order for DME Equipment being ordered: Motorized Wheelchair   Shawnee Dueñas DO   order for DME Equipment being ordered: Trapeze bar for hospital bed   Shawnee Dueñas,    OXYBUTYNIN CHLORIDE PO Take by mouth 2 times daily    Unknown, Entered By History   oxybutynin ER (DITROPAN XL) 15 MG 24 hr tablet    Reported, Patient   pantoprazole (PROTONIX) 40 MG EC tablet Take 1 tablet (40 mg) by mouth daily  Patient not taking: Reported on 5/26/2020   Skylar Moore MD   polyethylene glycol (MIRALAX/GLYCOLAX) packet Take 17 g by mouth 2 times daily as needed (constipation)   Khurram Livingston MD   potassium chloride ER (KLOR-CON M) 20 MEQ CR tablet    Reported, Patient   rosuvastatin (CRESTOR) 5 MG tablet Take 1 tablet (5 mg) by mouth At Bedtime   Skylar Moore MD   senna-docusate (SENOKOT-S;PERICOLACE) 8.6-50 MG per tablet Take 2 tablets by mouth 2 times daily  Patient taking differently: Take 2 tablets by mouth daily    Rosa Summers APRN CNP   tiZANidine (ZANAFLEX) 4 MG tablet TK ONE T PO TID PRN FOR MUSCLE SPASMS   Reported, Patient   traZODone (DESYREL) 100 MG tablet Take 100 mg by mouth At Bedtime   Unknown, Entered By History   VENTOLIN  (90 Base) MCG/ACT inhaler INHALE 2 PUFFS INTO THE LUNGS EVERY 6 HOURS AS NEEDED FOR SHORTNESS OF BREATH   Skylar Moore MD            Allergies:     Allergies   Allergen Reactions     Povidone Iodine      \"mild skin irritation\" per patient report     Toradol [Ketorolac] Other (See Comments)     Pt gets nightmares     Tramadol Other (See Comments)            Family History:     Family History   Problem Relation Age of Onset     Depression Son      Hypertension Mother      Heart Disease Mother         bypass     Depression Mother      Hypertension Father      Connective Tissue Disorder Father         ankylosing spondylitis     Cancer Father         colon; " prostate     Other Cancer Father         bladder cancer     Depression Sister            Social History:   Bhavani White  reports that she has never smoked. She has never used smokeless tobacco. She reports current drug use. Drug: Marijuana. She reports that she does not drink alcohol.          Review of Systems:   Constitutional: No fevers or chills  Eyes: No blurred or double vision  HENT: Reports headaches, No rhinorrhea, No sore throat  Respiratory: No cough or shortness of breath  Cardiovascular: Denies chest pain or palpitations  Gastrointestinal: Abdominal pain and nausea/vomiting  Genitourinary: No hematuria or dysuria  Musculoskeletal: Increased myalgias  Neurologic: Numbness/tingling in hands and feet  Integumentary: No skin rashes         Labs/Imaging   All new lab and imaging data was reviewed.   Recent Labs   Lab 06/03/20  1442   WBC 15.3*   HGB 13.4   HCT 45.5   MCV 74*        Recent Labs   Lab 06/03/20  1442      POTASSIUM 3.7   CHLORIDE 95   CO2 35*   ANIONGAP 6   *   BUN 19   CR 0.68   GFRESTIMATED >90   GFRESTBLACK >90   DORY 11.0*   PROTTOTAL 9.3*   ALBUMIN 4.0   BILITOTAL 0.4   ALKPHOS 107   AST 20   ALT 24     Recent Labs   Lab 06/03/20  1442   LIPASE 34*     Recent Labs   Lab 06/03/20  1442   TROPI <0.015       I have personally reviewed the imaging studies-   CT ABDOMEN PELVIS WITH CONTRAST 6/3/2020 4:02 PM     FINDINGS:   LOWER CHEST: Mild atelectasis right lung base with no significant  pleural fluid.     HEPATOBILIARY: Nodular-appearing liver contour is unchanged. Chronic  liver disease or cirrhosis is possible. Calcified gallstones are noted  in the gallbladder.     PANCREAS: Atrophic-appearing but without surrounding inflammation.     SPLEEN: Normal.     ADRENAL GLANDS: Normal.     KIDNEYS/BLADDER: Left renal cortical cysts are unchanged. Right kidney  is unremarkable. No hydronephrosis or urinary tract calculi. Bladder  is decompressed with a suprapubic catheter,  unchanged.     BOWEL: Proximal small-bowel obstruction is noted with fluid and air  distention of the stomach, duodenum and proximal jejunum. Transition  point is on series 2, image 58. The more distal small bowel and colon  are decompressed. Mild colonic constipation is noted.     LYMPH NODES: No enlarged abdominal or pelvic lymph nodes.     VASCULATURE: Unremarkable.     PELVIC ORGANS: Uterus not identified. No adnexal mass or free pelvic  fluid.     ADDITIONAL FINDINGS: Perihepatic ascites is again noted, slightly  improved since prior exam. Prior pleural effusions have also resolved.  Minimal perisplenic fluid remains. No free intraperitoneal air.  Anterior abdominal wall gastrostomy tube has been removed.                                                                      IMPRESSION:   1.  Small-bowel obstruction involving the proximal jejunum with  transition point in the midline upper abdomen.  2.  Cholelithiasis and probable cirrhotic liver morphology. Spleen is  normal in size.  3.  Upper abdominal ascites has slightly improved since prior exam.  4.  Stable left renal cortical cysts. Pubic catheter is again noted in  the bladder. Gastrostomy tube has been removed.      Lidia Akbar MD

## 2020-06-04 NOTE — CONSULTS
CLINICAL NUTRITION SERVICES  -  ASSESSMENT NOTE    Malnutrition: (6/4)   % Weight Loss:  > 7.5% in 3 months (severe malnutrition)  % Intake:  </= 50% for >/= 5 days (severe malnutrition) - will meet tomorrow  Subcutaneous Fat Loss:  Deferred at this time due to inability to see patient   Muscle Loss:  Deferred, see above   Fluid Retention:  None noted    Malnutrition Diagnosis: Severe malnutrition  In Context of:  Acute illness or injury  Chronic illness or disease     REASON FOR ASSESSMENT  Bhavani White is a 64 year old female seen by Registered Dietitian for Admission Nutrition Risk Screen for unintentional loss of 10# or more in the past two months    NUTRITION HISTORY  - Information obtained from EMR and patient over the phone per departmental precautions to prevent spread of COVID19 during Pandemic.     - Significant surgical history beginning in Dec 2019, with readmission in March 2020.  - PMH: morbid obesity, PUD, chronic pain on Methadone, ANIYA, neurogenic bladder, hypothyroid, DM2, asthma, dyslipidemia.   - Home meds include bowel meds, antiemetics, and metformin.      - Presented to ED with 3 days of nausea, vomiting, abdominal pain. Found to have proximal small bowel obstruction.  - History of TF from 1/2020-2/2020. This appears to have been stopped Mid-February.     - Per patient:  - No oral intake x3 days PTA due to nausea/vomiting and abdominal pain.   - Prior to that she was eating normally. Normal for her is 1 meal/day in the afternoon or evening.   - Dentures were lost during her last admission, so she has relied on softer foods she can mash against her upper palate.   - Does not snack, but will drink 1 Boost (high protein version) daily.   - consumes eggs, dairy products, ground meats for protein.   - NKFA    CURRENT NUTRITION ORDERS  Diet Order:     NPO     Current Intake/Tolerance:  N/A    NUTRITION FOCUSED PHYSICAL ASSESSMENT FOR DIAGNOSING MALNUTRITION)  No:  Deferred due to departmental  "precautions to prevent spread of COVID19 during Pandemic.          Observed:    Deferred    Obtained from Chart/Interdisciplinary Team:  Surgery following   NG in place to intermittent suction. Yesterday returned 1500 mL after placement with improvement in abdominal pain.     ANTHROPOMETRICS  Height: 5' 7\"  Weight: 210 lbs 1.57 oz (95.3 kg)   Body mass index is 32.91 kg/m .  Weight Status:  Obesity Grade I BMI 30-34.9  IBW: 61.4 kg  % IBW: 155%  Weight History: Weight can tend to flucutate with fluid. When she was here in March she confirmed that wt was somewhere in the 230s. Since then it appears she has lost ~28# or 11.8%.     Patient reports a total weight loss of about 40# since this all began, about 6 months ago (16%).   Wt Readings from Last 10 Encounters:   06/04/20 95.3 kg (210 lb 1.6 oz)   06/03/20 99.8 kg (220 lb)   05/26/20 99.8 kg (220 lb)   03/14/20 108 kg (238 lb 1.6 oz)   02/18/20 135.6 kg (299 lb)   02/11/20 136 kg (299 lb 13.2 oz)   01/21/20 93 kg (205 lb 0.4 oz)   11/01/19 113.4 kg (250 lb)   10/28/19 121.7 kg (268 lb 3.2 oz)   08/29/19 117.5 kg (259 lb)       LABS  Labs reviewed  Recent Labs   Lab 06/04/20  0725 06/04/20  0541 06/04/20  0228 06/03/20  2212 06/03/20  1442   GLC 95  --   --   --  151*   BGM  --  100* 94 113*  --      Lab Results   Component Value Date    A1C 6.0 06/04/2020    A1C 7.1 01/07/2020    A1C 7.3 10/28/2019    A1C 7.2 05/21/2019    A1C 6.4 01/29/2019       MEDICATIONS  Medications reviewed  MSSI   LR @ 100 ml/hr     ASSESSED NUTRITION NEEDS PER APPROVED PRACTICE GUIDELINES:  Dosing Weight 70 kg - adjusted   Estimated Energy Needs: 3073-7240 kcals (25-30 Kcal/Kg)  Justification: maintenance and obese  Estimated Protein Needs: + grams protein (1.2-1.5+ g pro/Kg)  Justification: maintenance   Estimated Fluid Needs: Per MD pending fluid status    MALNUTRITION:  % Weight Loss:  > 7.5% in 3 months (severe malnutrition)  % Intake:  </= 50% for >/= 5 days (severe " malnutrition) - will meet tomorrow  Subcutaneous Fat Loss:  Deferred at this time due to inability to see patient   Muscle Loss:  Deferred, see above   Fluid Retention:  None noted    Malnutrition Diagnosis: Severe malnutrition  In Context of:  Acute illness or injury  Chronic illness or disease    NUTRITION DIAGNOSIS:  Inadequate oral intake related to altered GI function as evidenced by no oral intake x3 days PTA due to nausea, vomiting, and abdominal pain.       NUTRITION INTERVENTIONS  Recommendations / Nutrition Prescription  Diet advancement vs nutrition support as medically indicated.       Implementation  Nutrition education: No education needs assessed at this time      Nutrition Goals  Diet advancement >CLD vs start nutrition support in the next 3-5 days.       MONITORING AND EVALUATION:  Progress towards goals will be monitored and evaluated per protocol and Practice Guidelines    Teodora Higgins RD, LD  Pager: [phone]

## 2020-06-04 NOTE — PROGRESS NOTES
[facility]    Medicine Progress Note - Hospitalist Service       Date of Admission:  6/3/2020  Date of Service: 06/04/2020    Assessment & Plan      Bhavani White is a 64 year old woman with extensive surgical PMH significant for severe morbid obesity (BMI near 60), PUD, chronic pain on Methadone, ANIYA, neurogenic bladder due to spinal stenosis, hypothyroid, NIDDM2, asthma, HTN, and dyslipidemia who underwent emergent repair of perforated duodenal ulcer 12/29/2019 with subsequent prolonged hospitalizations for intra-abdominal infections requiring exploratory laparotomy, TRUONG, drain placement with perforation, and wound vac who was readmitted 3/13/20-3/16/20 for sepsis and intra-abdominal infections and abdominal wall cellulitis.      She presented to the emergency department at [facility] on 6/3/20 with complaints of 3-day history of nausea, vomiting, abdominal pain was found to have a proximal small bowel obstruction and subsequently transferred to [facility] for surgical consultation and further management.     Proximal small bowel obstruction  Presented with 3-day history of nausea, vomiting, abdominal pain. Last bowel movement x3 days PTA. Denies fevers or chills. States abdominal pain has worsened, it is described as severe diffuse abdomen but worse at the epigastrum and upper quadrants. No flatus. No hematochezia or hematemesis. CT with contrast of abdomen and pelvis completed showing small bowel obstruction involving the proximal jejunum with transition point in the midline upper abdomen.  Upper abdominal ascites slightly improved since prior exam. On exam, diffusely tender, +guarding. Hypoactive bowel sounds. NGT placed in ED with 1500ml green/yellow output.   Plan:  - General Surgery consulted - no need for emergent surgical intervention  - NPO, MIVF  - NGT placed  - No antibiotics currently  - IV PPI     Mild hypoxia on 2L NC   History of Hypoxia 3/2020 admission  CT  scans March 2020 admission showed new or worsening bilateral pleural effusions which were attributed to possible acute systolic or diastolic CHF exacerbation: noting that an initial TTE from December 2019 had shown reduced LVEF in the setting of critical illness, improved on repeat TTE 1/14/2020 to normalized LVEF. She has been on lasix daily at home. Repeat TTE March 2020 EF 60-65%, grade II or moderate diastolic dysfunction.  Plan:  - Aggressive IS  - Monitor O2 saturations  - Monitor volume status, may need diuresis if appears fluid overloaded     Hypercalcemia  May be related to dehydration. Though has had elevated levels in the past. Received 1L NS in the ED.  Plan:  - Monitor     Recent suspected abdominal cellulitis 3/2020  Recent intra-abdominal infections 3/2020  March admission, presented with new onset purulent drainage from several wound sites and burning epigastric discomfort. She had fever, tachycardia, leukocytosis and thrombocytosis. CT showed new or ongoing intra-abdominal fluid collections thought to be staphyloccocal cellulitis and possibly also polymicrobial intra-abdominal abscesses.     Chronic Anemia  Hgb stable. Above baseline on admission likely due to dehydration.  - Monitor closely while hospitalized    ANIYA: It seems she may be intermittently adherent to CPAP.     Chronic pain   Neurogenic bladder with chronic suprapubic catheter  It seems she has severe spinal stenosis and takes Methadone as an outpatient. She was reportedly wheelchair bound prior to 12/2019, and has had a supra-pubic catheter in place for chronic retention.  [PTA Cymbalta 120 mg daily, gabapentin 1600 mg 3 times daily, methadone 20 mg twice daily, trazodone 100 mg nightly, hydroxyzine 50 mg twice daily as needed, tizanidine 4 mg 3 times daily as needed]  - Hold PTA regimen of Cymbalta, trazodone and Neurontin while NPO  - Pain consultation give methadone use and current SBO  - Suprapubic catheter in place     Non insulin  type 2 diabetes mellitus  Most recent A1c 7.1% in 1/2020.  PTA Regimen: metformin  mg twice daily  - Hypoglycemia protocol, Q 4 hours while NPO  - Sliding scale insulin Medium     Asthma: Resume home inhalers     Hypertension  Dyslipidemia  [PTA amlodipine 5 mg daily, lisinopril 5 mg daily, Toprol-XL 12.5 mg daily]  - Hold PTA antihypertensives while n.p.o. with NG tube.    - PRN IV metoprolol for heart rate greater than 120 and PRN hydralazine for SBP greater than 180 available     Hypothyroid  [PTA levothyroxine 88 mcg daily]  - Hold PTA Synthroid while n.p.o.       Diet: NPO for Medical/Clinical Reasons Except for: No Exceptions    DVT Prophylaxis: Pneumatic Compression Devices  Lundberg Catheter: in place, indication:    Code Status: Full Code           Disposition Plan   Expected discharge: 2 - 3 days, recommended to prior living arrangement once SBO improved and cleared by surgery.  Entered: Constantin Duran MD 06/04/2020, 4:50 PM       The patient's care was discussed with the Bedside Nurse and Patient.    Constantin Duran MD  Hospitalist Service  [facility]    ______________________________________________________________________    Interval History     No acute events since admission  Pain controlled for most part  Tolerating NGT  No fevers or chills, no nausea/vomiting  No CP/SOB    Data reviewed today: I reviewed all medications, new labs and imaging results over the last 24 hours. I personally reviewed no images or EKG's today.    Physical Exam   Vital Signs: Temp: 98.3  F (36.8  C) Temp src: Oral BP: 134/81 Pulse: 78 Heart Rate: 65 Resp: 16 SpO2: 98 % O2 Device: Nasal cannula Oxygen Delivery: 1 LPM  Weight: 210 lbs 1.57 oz    General: no apparent distress   Respiratory: Clear to auscultation bilaterally, no rales, wheezing, or rhonchi.  Cardiovascular: Regular rate and rhythm, +S1 and S2, no murmur auscultated. No peripheral edema.   Gastrointestinal: Soft, +guarding, very TTP with minimal  palpation. Decreased bowel sounds. Midline healed surgical incisions.   : Suprapubic catheter in place.  Skin: Warm, dry. No obvious rashes or lesions on exposed skin. Dorsalis pedis pulses palpable bilaterally.  Musculoskeletal: No joint swelling, erythema or tenderness. Moves all extremities equally. LLE contracted with decreased muscle mass. No peripheral edema.  Neurologic: AAO x3. normal strength and sensation.  Psychiatric: Appropriate mood and affect. No obvious anxiety or depression.    Data   Recent Labs   Lab 06/04/20  0725 06/03/20  1442   WBC 10.5 15.3*   HGB 11.4* 13.4   MCV 74* 74*    342    136   POTASSIUM 3.5 3.7   CHLORIDE 102 95   CO2 36* 35*   BUN 21 19   CR 0.78 0.68   ANIONGAP 4 6   DORY 9.8 11.0*   GLC 95 151*   ALBUMIN 3.0* 4.0   PROTTOTAL 6.6* 9.3*   BILITOTAL 0.5 0.4   ALKPHOS 79 107   ALT 21 24   AST 16 20   LIPASE  --  34*   TROPI  --  <0.015     No results found for this or any previous visit (from the past 24 hour(s)).  Medications     lactated ringers 100 mL/hr at 06/04/20 1507       insulin aspart  1-6 Units Subcutaneous Q4H     lidocaine  1 patch Transdermal Q24H     lidocaine   Transdermal Q8H     methadone  10 mg Intravenous Q12H     pantoprazole (PROTONIX) IV  40 mg Intravenous Q24H     sodium chloride (PF)  3 mL Intracatheter Q8H

## 2020-06-04 NOTE — CONSULTS
Inpatient Pharmacy Pain Consult:   We met with Bhavani for about twelve minutes.   Other present during the interview include: Cynthia BOWDEN, pharmacy resident     Subjective:  Alertness: She was difficult to wake up; she presented with movement of extremities when sleeping.  As she awoke, she stated that she was coming out of a nightmare. Upon being awake she was lucid and conversational.     Anxiety: She reports some anxiety.     Competency as an historian at this time: Good    Non-Verbal Pain presentation: She was fairly still with some wincing; presented with discomfort.     Patient description of pain at this time: Nausea is much less; primary is her chronic neuropathy pain in back and extremities.     Triggers for pain: Not receiving her chronic medications (duloxetine, gabapentin, methadone).     Pain Score(s): 8-9 out of ten    Non-Pain Score Assessment of pain Control:  Quality of sleep/rest: to be determined; she has sleep apnea   Participation in activity-therapy as ordered: to be determined   Ability to focus away from pain: to some degree     Objective:  Pertinent medical conditions/Labs to be aware for managing pain: anemia, asthma, diabetes, anxiety, depression, fibromyalgia, hypertension, hypothyroid, latrogenic Cushing s syndrome, spinal stenosis, osteoarthritis, history of MI, history of perforated abdominal ulcer, obstructive sleep apnea      Pertinent pain medication allergies/sensitivities: ketorolac and tramadol     Chronic pain history: Significant pain issues go back to at least 2014 per being on methadone at that time. She was admitted on duloxetine and high-dose gabapentin.     Pertinent pain medication history: She was admitted on duloxetine, high-dose gabapentin and methadone. She has been on multiple other medications over the years.     Side effects from pain medications: Always a concern for over-sedation with sleep apnea.     Addiction history: We did not discuss; she did not present as drug  seeking.     Assessment:  We discussed mental diversion techniques to focus away from pain including focused breathing. I encouraged patient to divert thoughts to activities of comfort (music, reading, TV, thinking about favorite activities).  She was able to be distracted and talk about her fancy pillow.   We discussed reasonable goals that consider optimizing pain control and assuring a safe regimen.   Opiate Evaluation:  Per patient she has not been keeping her medications including methadone for a few days. Suggest that resuming methadone is important for her chronic pain.     Consider methadone 10 mg IV slow push Q 12 H if not able to take methadone 20 mg PO Q 12 H. (IV dose is 50% of ORAL).      Adjust hydromorphone to 0.5 mg IV Q 2 H PRN.     Non-Opiate Evaluation:  Acetaminophen: Do not recommend at this time.    Anti-anxiety agents: Add lorazepam 0.5 mg IV Q 4 H PRN for anxiety and gabapentin withdrawal symptoms.      Anti-depressants: Resume PTA duloxetine when able.     Anti-histamines: Resume PTA hydroxyzine when able.     Muscle relaxants: Do not recommend at this time.    Neuropathic agents: It is concerning that she is not taking her gabapentin; resume when able as tablet or suspension. Gabapentin would be similar to alcohol withdrawal symptoms; mostly increased anxiety/insomnia/ restlessness/ nausea/ irritability.     NSAIDS: Do not recommend at this time.    Topical products: Added lidocaine patch applied to most affected area of back.     ICE/Heat: defer   Therapy PT/OT: defer     Plan/Recommendations:  Added lorazepam 0.5 mg IV Q 4 H PRN for anxiety and gabapentin withdrawal symptoms.      Consider methadone 10 mg IV slow push Q 12 H if not able to take methadone 20 mg PO Q 12 H. (IV dose is 50% of ORAL).     Added lidocaine patch applied to most affected area of back.    Thank you for this consult!  Wojciech Luke, Cell Phone: [phone]

## 2020-06-04 NOTE — PROGRESS NOTES
"Surgery    Feeling better but still reports diffuse upper abd pain.  Nausea abated.  Denies CP, dyspnea  NG tube in place  No flatus or BM - last bm Sunday, vomiting for 3 days.    Gen:  Awake, Alert, NAD  Blood pressure 137/78, pulse 89, temperature 98.2  F (36.8  C), temperature source Oral, resp. rate 16, height 1.702 m (5' 7\"), weight 95.3 kg (210 lb 1.6 oz), SpO2 95 %  Resp - non-labored  Abdomen - soft with firm mass on right abd (no correlation on CT except stool in colon), tender, non distended. Multiple surgical and drain scars.   Extremities - no lower extremity edema or calf tenderness    NG 350cc since admission.    Hgb 11.4    A/P Proximal SBO, s/p repair of complex gastric perforation in January 2020.    - check NG placement with x-ray  - continue NG until return of bowel function  - would initiate tpn sooner rather than later.  - recheck CT in a couple days given history of abd catastrophe  - PT/OT consult    Juan Boyle PA-C  Office: [phone]  Pager: [phone]    "

## 2020-06-04 NOTE — PROGRESS NOTES
MD Notification    Notified Person: MD    Notified Person Name: Dr. Granger    Notification Date/Time: 6/4/20 0000    Notification Interaction: Paged MD, Orders placed    Purpose of Notification: Uncontrolled pain    Orders Received: Dilaudid 0.5-1mg    Comments:

## 2020-06-04 NOTE — PLAN OF CARE
VSS on 1-2L NC. A/Ox 4. CMS intact. Pain controlled with PRN Dilaudid 0.5mg. Up with 1 stand/pivot, wheelchair bound at home. Strict NPO. NG tube at 63cm, LIS, 150ml output overnight. N&V intermittent. Suprapubic catheter with adequate output. LS clear. Abdominal tenderness on palpation. Will continue to monitor.

## 2020-06-05 ENCOUNTER — TELEPHONE (OUTPATIENT)
Dept: FAMILY MEDICINE | Facility: CLINIC | Age: 65
End: 2020-06-05

## 2020-06-05 LAB
ANION GAP SERPL CALCULATED.3IONS-SCNC: 5 MMOL/L (ref 3–14)
BASOPHILS # BLD AUTO: 0.1 10E9/L (ref 0–0.2)
BASOPHILS NFR BLD AUTO: 0.6 %
BUN SERPL-MCNC: 20 MG/DL (ref 7–30)
CALCIUM SERPL-MCNC: 9.8 MG/DL (ref 8.5–10.1)
CHLORIDE SERPL-SCNC: 103 MMOL/L (ref 94–109)
CO2 SERPL-SCNC: 34 MMOL/L (ref 20–32)
CREAT SERPL-MCNC: 0.74 MG/DL (ref 0.52–1.04)
DIFFERENTIAL METHOD BLD: ABNORMAL
EOSINOPHIL # BLD AUTO: 0.1 10E9/L (ref 0–0.7)
EOSINOPHIL NFR BLD AUTO: 0.7 %
ERYTHROCYTE [DISTWIDTH] IN BLOOD BY AUTOMATED COUNT: 19.7 % (ref 10–15)
GFR SERPL CREATININE-BSD FRML MDRD: 86 ML/MIN/{1.73_M2}
GLUCOSE BLDC GLUCOMTR-MCNC: 75 MG/DL (ref 70–99)
GLUCOSE BLDC GLUCOMTR-MCNC: 76 MG/DL (ref 70–99)
GLUCOSE BLDC GLUCOMTR-MCNC: 76 MG/DL (ref 70–99)
GLUCOSE BLDC GLUCOMTR-MCNC: 79 MG/DL (ref 70–99)
GLUCOSE BLDC GLUCOMTR-MCNC: 81 MG/DL (ref 70–99)
GLUCOSE BLDC GLUCOMTR-MCNC: 86 MG/DL (ref 70–99)
GLUCOSE SERPL-MCNC: 79 MG/DL (ref 70–99)
HCT VFR BLD AUTO: 38.8 % (ref 35–47)
HGB BLD-MCNC: 11.6 G/DL (ref 11.7–15.7)
IMM GRANULOCYTES # BLD: 0 10E9/L (ref 0–0.4)
IMM GRANULOCYTES NFR BLD: 0.1 %
LYMPHOCYTES # BLD AUTO: 1.6 10E9/L (ref 0.8–5.3)
LYMPHOCYTES NFR BLD AUTO: 18.8 %
MCH RBC QN AUTO: 22.1 PG (ref 26.5–33)
MCHC RBC AUTO-ENTMCNC: 29.9 G/DL (ref 31.5–36.5)
MCV RBC AUTO: 74 FL (ref 78–100)
MONOCYTES # BLD AUTO: 0.6 10E9/L (ref 0–1.3)
MONOCYTES NFR BLD AUTO: 7.4 %
NEUTROPHILS # BLD AUTO: 6.2 10E9/L (ref 1.6–8.3)
NEUTROPHILS NFR BLD AUTO: 72.4 %
NRBC # BLD AUTO: 0 10*3/UL
NRBC BLD AUTO-RTO: 0 /100
PLATELET # BLD AUTO: 264 10E9/L (ref 150–450)
POTASSIUM SERPL-SCNC: 3.5 MMOL/L (ref 3.4–5.3)
RBC # BLD AUTO: 5.24 10E12/L (ref 3.8–5.2)
SODIUM SERPL-SCNC: 142 MMOL/L (ref 133–144)
WBC # BLD AUTO: 8.6 10E9/L (ref 4–11)

## 2020-06-05 PROCEDURE — 25000132 ZZH RX MED GY IP 250 OP 250 PS 637: Mod: GY | Performed by: HOSPITALIST

## 2020-06-05 PROCEDURE — 36415 COLL VENOUS BLD VENIPUNCTURE: CPT | Performed by: PHYSICIAN ASSISTANT

## 2020-06-05 PROCEDURE — 12000000 ZZH R&B MED SURG/OB

## 2020-06-05 PROCEDURE — 99231 SBSQ HOSP IP/OBS SF/LOW 25: CPT | Performed by: SURGERY

## 2020-06-05 PROCEDURE — C9113 INJ PANTOPRAZOLE SODIUM, VIA: HCPCS | Performed by: PHYSICIAN ASSISTANT

## 2020-06-05 PROCEDURE — 25800030 ZZH RX IP 258 OP 636: Performed by: PHYSICIAN ASSISTANT

## 2020-06-05 PROCEDURE — 99232 SBSQ HOSP IP/OBS MODERATE 35: CPT | Performed by: STUDENT IN AN ORGANIZED HEALTH CARE EDUCATION/TRAINING PROGRAM

## 2020-06-05 PROCEDURE — 85025 COMPLETE CBC W/AUTO DIFF WBC: CPT | Performed by: PHYSICIAN ASSISTANT

## 2020-06-05 PROCEDURE — 25000132 ZZH RX MED GY IP 250 OP 250 PS 637: Mod: GY

## 2020-06-05 PROCEDURE — 00000146 ZZHCL STATISTIC GLUCOSE BY METER IP

## 2020-06-05 PROCEDURE — 25000128 H RX IP 250 OP 636

## 2020-06-05 PROCEDURE — 80048 BASIC METABOLIC PNL TOTAL CA: CPT | Performed by: PHYSICIAN ASSISTANT

## 2020-06-05 PROCEDURE — 25000128 H RX IP 250 OP 636: Performed by: PHYSICIAN ASSISTANT

## 2020-06-05 RX ADMIN — METHADONE HYDROCHLORIDE 10 MG: 10 INJECTION, SOLUTION INTRAMUSCULAR; INTRAVENOUS; SUBCUTANEOUS at 05:39

## 2020-06-05 RX ADMIN — HYDROMORPHONE HYDROCHLORIDE 0.5 MG: 1 INJECTION, SOLUTION INTRAMUSCULAR; INTRAVENOUS; SUBCUTANEOUS at 02:29

## 2020-06-05 RX ADMIN — HYDROMORPHONE HYDROCHLORIDE 0.5 MG: 1 INJECTION, SOLUTION INTRAMUSCULAR; INTRAVENOUS; SUBCUTANEOUS at 04:44

## 2020-06-05 RX ADMIN — HYDROMORPHONE HYDROCHLORIDE 0.5 MG: 1 INJECTION, SOLUTION INTRAMUSCULAR; INTRAVENOUS; SUBCUTANEOUS at 11:14

## 2020-06-05 RX ADMIN — HYDROMORPHONE HYDROCHLORIDE 0.5 MG: 1 INJECTION, SOLUTION INTRAMUSCULAR; INTRAVENOUS; SUBCUTANEOUS at 19:59

## 2020-06-05 RX ADMIN — HYDROMORPHONE HYDROCHLORIDE 0.5 MG: 1 INJECTION, SOLUTION INTRAMUSCULAR; INTRAVENOUS; SUBCUTANEOUS at 00:33

## 2020-06-05 RX ADMIN — METHADONE HYDROCHLORIDE 10 MG: 10 INJECTION, SOLUTION INTRAMUSCULAR; INTRAVENOUS; SUBCUTANEOUS at 18:03

## 2020-06-05 RX ADMIN — PANTOPRAZOLE SODIUM 40 MG: 40 INJECTION, POWDER, FOR SOLUTION INTRAVENOUS at 21:31

## 2020-06-05 RX ADMIN — LIDOCAINE 1 PATCH: 560 PATCH PERCUTANEOUS; TOPICAL; TRANSDERMAL at 07:49

## 2020-06-05 RX ADMIN — HYDROMORPHONE HYDROCHLORIDE 0.5 MG: 1 INJECTION, SOLUTION INTRAMUSCULAR; INTRAVENOUS; SUBCUTANEOUS at 22:01

## 2020-06-05 RX ADMIN — SODIUM CHLORIDE, POTASSIUM CHLORIDE, SODIUM LACTATE AND CALCIUM CHLORIDE: 600; 310; 30; 20 INJECTION, SOLUTION INTRAVENOUS at 00:33

## 2020-06-05 RX ADMIN — HYDROMORPHONE HYDROCHLORIDE 0.5 MG: 1 INJECTION, SOLUTION INTRAMUSCULAR; INTRAVENOUS; SUBCUTANEOUS at 13:38

## 2020-06-05 RX ADMIN — HYDROMORPHONE HYDROCHLORIDE 0.5 MG: 1 INJECTION, SOLUTION INTRAMUSCULAR; INTRAVENOUS; SUBCUTANEOUS at 15:55

## 2020-06-05 RX ADMIN — HYDROMORPHONE HYDROCHLORIDE 0.5 MG: 1 INJECTION, SOLUTION INTRAMUSCULAR; INTRAVENOUS; SUBCUTANEOUS at 07:49

## 2020-06-05 RX ADMIN — PHENOL 1 ML: 1.5 LIQUID ORAL at 21:31

## 2020-06-05 ASSESSMENT — ACTIVITIES OF DAILY LIVING (ADL)
ADLS_ACUITY_SCORE: 22

## 2020-06-05 ASSESSMENT — MIFFLIN-ST. JEOR: SCORE: 1547.63

## 2020-06-05 NOTE — PLAN OF CARE
VSS on RA, CMS intact. NG trail with about less than 150cc output and now clamped. Suprapubic cath-intact and patent. C/o back pain- managed with dilaudid and scheduled methadone. Attempted to sit on chair but asked to transfer back to bed with assist of 2 with lift. Will continue to monitor.

## 2020-06-05 NOTE — PLAN OF CARE
Bp on hypertensive side. Endorses severe back pain, dilaudid IV helping some. Flatus+ however no BM yet. Clamping trials initiated, pt tolerating clears somewhat. Pt declined several attempts to get out of bed, nursing will keep on encouraging activity.

## 2020-06-05 NOTE — PLAN OF CARE
Pt is alert and oriented x 4, AVSS except elevated BP, room air. Lungs clear.   Abdominal tender. Abdominal pain treated with dilaudid 0.5 mg.  + BS. No nausea. NG tube to LIS output green, thin.  SP catheter patent. BG checks no coverage needed.

## 2020-06-05 NOTE — PROGRESS NOTES
[facility]    Medicine Progress Note - Hospitalist Service       Date of Admission:  6/3/2020  Date of Service: 06/05/2020    Assessment & Plan      Bhavani White is a 64 year old woman with extensive surgical PMH significant for severe morbid obesity (BMI near 60), PUD, chronic pain on Methadone, ANIYA, neurogenic bladder due to spinal stenosis, hypothyroid, NIDDM2, asthma, HTN, and dyslipidemia who underwent emergent repair of perforated duodenal ulcer 12/29/2019 with subsequent prolonged hospitalizations for intra-abdominal infections requiring exploratory laparotomy, TRUONG, drain placement with perforation, and wound vac who was readmitted 3/13/20-3/16/20 for sepsis and intra-abdominal infections and abdominal wall cellulitis.      She presented to the emergency department at [facility] on 6/3/20 with complaints of 3-day history of nausea, vomiting, abdominal pain was found to have a proximal small bowel obstruction and subsequently transferred to [facility] for surgical consultation and further management.     Proximal small bowel obstruction  Presented with 3-day history of nausea, vomiting, abdominal pain. Last bowel movement x3 days PTA. Denies fevers or chills. States abdominal pain has worsened, it is described as severe diffuse abdomen but worse at the epigastrum and upper quadrants. No flatus. No hematochezia or hematemesis. CT with contrast of abdomen and pelvis completed showing small bowel obstruction involving the proximal jejunum with transition point in the midline upper abdomen.  Upper abdominal ascites slightly improved since prior exam. On exam, diffusely tender, +guarding. Hypoactive bowel sounds. NGT placed in ED with 1500ml green/yellow output.   Plan:  - General Surgery consulted - no need for emergent surgical intervention  - NPO, MIVF  - NGT placed, clamping trials per surgeyr  - No antibiotics currently  - IV PPI     Mild hypoxia on 2L NC   History of  Hypoxia 3/2020 admission  CT scans March 2020 admission showed new or worsening bilateral pleural effusions which were attributed to possible acute systolic or diastolic CHF exacerbation: noting that an initial TTE from December 2019 had shown reduced LVEF in the setting of critical illness, improved on repeat TTE 1/14/2020 to normalized LVEF. She has been on lasix daily at home. Repeat TTE March 2020 EF 60-65%, grade II or moderate diastolic dysfunction.  Plan:  - Aggressive IS  - Monitor O2 saturations  - Monitor volume status, may need diuresis if appears fluid overloaded     Hypercalcemia  May be related to dehydration. Though has had elevated levels in the past. Received 1L NS in the ED.  Plan:  - Monitor     Recent suspected abdominal cellulitis 3/2020  Recent intra-abdominal infections 3/2020  March admission, presented with new onset purulent drainage from several wound sites and burning epigastric discomfort. She had fever, tachycardia, leukocytosis and thrombocytosis. CT showed new or ongoing intra-abdominal fluid collections thought to be staphyloccocal cellulitis and possibly also polymicrobial intra-abdominal abscesses.     Chronic Anemia  Hgb stable. Above baseline on admission likely due to dehydration.  - Monitor closely while hospitalized    ANIYA: It seems she may be intermittently adherent to CPAP.     Chronic pain   Neurogenic bladder with chronic suprapubic catheter  It seems she has severe spinal stenosis and takes Methadone as an outpatient. She was reportedly wheelchair bound prior to 12/2019, and has had a supra-pubic catheter in place for chronic retention.  [PTA Cymbalta 120 mg daily, gabapentin 1600 mg 3 times daily, methadone 20 mg twice daily, trazodone 100 mg nightly, hydroxyzine 50 mg twice daily as needed, tizanidine 4 mg 3 times daily as needed]  - Hold PTA regimen of Cymbalta, trazodone and Neurontin while NPO  - Pain consultation give methadone use and current SBO  - Suprapubic  catheter in place     Non insulin type 2 diabetes mellitus  Most recent A1c 7.1% in 1/2020.  PTA Regimen: metformin  mg twice daily  Plan:  - Hypoglycemia protocol, Q 4 hours while NPO  - Sliding scale insulin Medium     Asthma: Resume home inhalers     Hypertension  Dyslipidemia  [PTA amlodipine 5 mg daily, lisinopril 5 mg daily, Toprol-XL 12.5 mg daily]  - Hold PTA antihypertensives while n.p.o. with NG tube.    - PRN IV metoprolol for heart rate greater than 120 and PRN hydralazine for SBP greater than 180 available     Hypothyroid  [PTA levothyroxine 88 mcg daily]  - Hold PTA Synthroid while n.p.o.       Diet: Clear Liquid Diet    DVT Prophylaxis: Pneumatic Compression Devices  Lundberg Catheter: in place, indication:    Code Status: Full Code           Disposition Plan   Expected discharge: 2 - 3 days, recommended to prior living arrangement once SBO improved and cleared by surgery.  Entered: Constantin Duran MD 06/05/2020, 12:26 PM       The patient's care was discussed with the Bedside Nurse and Patient.    Constantin Duran MD  Hospitalist Service  [facility]    ______________________________________________________________________    Interval History     No acute events overnight  Pain controlled today, slightly better  Tolerating NGT, clamping trial today  No fevers or chills, no nausea/vomiting  No CP/SOB. No new complaints    Data reviewed today: I reviewed all medications, new labs and imaging results over the last 24 hours. I personally reviewed no images or EKG's today.    Physical Exam   Vital Signs: Temp: 97.5  F (36.4  C) Temp src: Axillary BP: (!) 156/84 Pulse: 72 Heart Rate: 74 Resp: 16 SpO2: 96 % O2 Device: None (Room air) Oxygen Delivery: 1 LPM  Weight: 211 lbs 10.27 oz    General: no apparent distress   Respiratory: CTABL  Cardiovascular: Regular rate and rhythm, +S1 and S2, no murmur auscultated. No peripheral edema.   Gastrointestinal: Soft, +guarding, very TTP with minimal  palpation. Decreased bowel sounds. Midline healed surgical incisions.   : Suprapubic catheter in place.  Musculoskeletal: No joint swelling, erythema or tenderness.  Neurologic: AAO x3. normal strength and sensation.  Psychiatric: Appropriate mood and affect. N    Data   Recent Labs   Lab 06/05/20  0756 06/04/20  0725 06/03/20  1442   WBC 8.6 10.5 15.3*   HGB 11.6* 11.4* 13.4   MCV 74* 74* 74*    272 342    142 136   POTASSIUM 3.5 3.5 3.7   CHLORIDE 103 102 95   CO2 34* 36* 35*   BUN 20 21 19   CR 0.74 0.78 0.68   ANIONGAP 5 4 6   DORY 9.8 9.8 11.0*   GLC 79 95 151*   ALBUMIN  --  3.0* 4.0   PROTTOTAL  --  6.6* 9.3*   BILITOTAL  --  0.5 0.4   ALKPHOS  --  79 107   ALT  --  21 24   AST  --  16 20   LIPASE  --   --  34*   TROPI  --   --  <0.015     Recent Results (from the past 24 hour(s))   XR Abdomen 1 View    Narrative    XR ABDOMEN ONE VIEW   6/4/2020  5:35 PM     HISTORY: Verify nasogastric tube placement.      Impression    IMPRESSION: Supine view of the abdomen is performed. Nasogastric tube  extends below the left hemidiaphragm overlying the expected region of  the stomach. Probable colonic constipation. Degenerative scoliotic  deformity lumbar spine is noted.    LEXIS OSORIO MD     Medications     lactated ringers 100 mL/hr at 06/05/20 0800       insulin aspart  1-6 Units Subcutaneous Q4H     lidocaine  1 patch Transdermal Q24H     lidocaine   Transdermal Q8H     methadone  10 mg Intravenous Q12H     pantoprazole (PROTONIX) IV  40 mg Intravenous Q24H     sodium chloride (PF)  3 mL Intracatheter Q8H

## 2020-06-05 NOTE — PROGRESS NOTES
Surgery    Patient feels better this morning.  Pain improved.  Has started to pass some flatus.  No nausea or vomiting.  Feels very thirsty.  NG output significantly decreased.    Abdomen-soft with mild tenderness throughout.  Improved from yesterday.    A/P  Improving.  Still will go very slow.  She feels very thirsty so we will try clamping trials and liquids.  Please check residuals and record every 4 hours.  Placed back to suction if over 150 or nausea.    Ayan Lopez M.D.  [facility]  [phone]

## 2020-06-05 NOTE — PLAN OF CARE
PT/OT: Eval orders received, chart reviewed. Pt supine upon arrival. Declining OOB mobility due to back pain. Encouraged OOB mobility to assist with pain management. Pt continues to decline, requesting to cancel PT this day and r/s for tomorrow

## 2020-06-05 NOTE — TELEPHONE ENCOUNTER
Summary:    Patient is due/failing the following:   MAMMOGRAM    Reviewed:  [x] CARE EVERYWHERE  [x] LAST OV NOTE INCLUDING ENDO  [x] FYI TAB  [x] MYCHART ACTIVE?  [x] LAST PANEL ENCOUNTER  [x] FUTURE APPTS  [x] IMMUNIZATIONS          Action needed:   Patient needs office visit for mammo.    Type of outreach:    Sent irisnote message.                                                                               Saba Lo/[facility]---[facility]

## 2020-06-05 NOTE — PLAN OF CARE
A&O x4. AVSS on 2L O2. Pain managed w/ IV dilaudid, and scheduled methadone. Pt, wheelchair bound, turns A2. Suprapubic catheter intact, adequate output, drain gets kinked @ times. Strict NPO. NGT to LIS, green output. Pt consistently refused to transfer to the bed d/t pain. Will CTM

## 2020-06-06 ENCOUNTER — APPOINTMENT (OUTPATIENT)
Dept: PHYSICAL THERAPY | Facility: CLINIC | Age: 65
DRG: 389 | End: 2020-06-06
Attending: PHYSICIAN ASSISTANT
Payer: MEDICARE

## 2020-06-06 LAB
ANION GAP SERPL CALCULATED.3IONS-SCNC: 4 MMOL/L (ref 3–14)
BUN SERPL-MCNC: 13 MG/DL (ref 7–30)
CALCIUM SERPL-MCNC: 9.1 MG/DL (ref 8.5–10.1)
CHLORIDE SERPL-SCNC: 101 MMOL/L (ref 94–109)
CO2 SERPL-SCNC: 33 MMOL/L (ref 20–32)
CREAT SERPL-MCNC: 0.64 MG/DL (ref 0.52–1.04)
ERYTHROCYTE [DISTWIDTH] IN BLOOD BY AUTOMATED COUNT: 19.2 % (ref 10–15)
GFR SERPL CREATININE-BSD FRML MDRD: >90 ML/MIN/{1.73_M2}
GLUCOSE BLDC GLUCOMTR-MCNC: 113 MG/DL (ref 70–99)
GLUCOSE BLDC GLUCOMTR-MCNC: 116 MG/DL (ref 70–99)
GLUCOSE BLDC GLUCOMTR-MCNC: 125 MG/DL (ref 70–99)
GLUCOSE BLDC GLUCOMTR-MCNC: 145 MG/DL (ref 70–99)
GLUCOSE BLDC GLUCOMTR-MCNC: 148 MG/DL (ref 70–99)
GLUCOSE BLDC GLUCOMTR-MCNC: 151 MG/DL (ref 70–99)
GLUCOSE BLDC GLUCOMTR-MCNC: 158 MG/DL (ref 70–99)
GLUCOSE BLDC GLUCOMTR-MCNC: 62 MG/DL (ref 70–99)
GLUCOSE BLDC GLUCOMTR-MCNC: 79 MG/DL (ref 70–99)
GLUCOSE SERPL-MCNC: 118 MG/DL (ref 70–99)
HCT VFR BLD AUTO: 36.9 % (ref 35–47)
HGB BLD-MCNC: 11.5 G/DL (ref 11.7–15.7)
MCH RBC QN AUTO: 22.5 PG (ref 26.5–33)
MCHC RBC AUTO-ENTMCNC: 31.2 G/DL (ref 31.5–36.5)
MCV RBC AUTO: 72 FL (ref 78–100)
PLATELET # BLD AUTO: 237 10E9/L (ref 150–450)
POTASSIUM SERPL-SCNC: 3.3 MMOL/L (ref 3.4–5.3)
POTASSIUM SERPL-SCNC: 3.8 MMOL/L (ref 3.4–5.3)
RBC # BLD AUTO: 5.11 10E12/L (ref 3.8–5.2)
SODIUM SERPL-SCNC: 138 MMOL/L (ref 133–144)
WBC # BLD AUTO: 10 10E9/L (ref 4–11)

## 2020-06-06 PROCEDURE — 25000128 H RX IP 250 OP 636

## 2020-06-06 PROCEDURE — 36415 COLL VENOUS BLD VENIPUNCTURE: CPT | Performed by: INTERNAL MEDICINE

## 2020-06-06 PROCEDURE — 84132 ASSAY OF SERUM POTASSIUM: CPT | Performed by: INTERNAL MEDICINE

## 2020-06-06 PROCEDURE — 97161 PT EVAL LOW COMPLEX 20 MIN: CPT | Mod: GP

## 2020-06-06 PROCEDURE — 80048 BASIC METABOLIC PNL TOTAL CA: CPT | Performed by: STUDENT IN AN ORGANIZED HEALTH CARE EDUCATION/TRAINING PROGRAM

## 2020-06-06 PROCEDURE — 25000128 H RX IP 250 OP 636: Performed by: PHYSICIAN ASSISTANT

## 2020-06-06 PROCEDURE — 99232 SBSQ HOSP IP/OBS MODERATE 35: CPT | Performed by: INTERNAL MEDICINE

## 2020-06-06 PROCEDURE — 25000132 ZZH RX MED GY IP 250 OP 250 PS 637: Mod: GY | Performed by: PHYSICIAN ASSISTANT

## 2020-06-06 PROCEDURE — C9113 INJ PANTOPRAZOLE SODIUM, VIA: HCPCS | Performed by: PHYSICIAN ASSISTANT

## 2020-06-06 PROCEDURE — 85027 COMPLETE CBC AUTOMATED: CPT | Performed by: STUDENT IN AN ORGANIZED HEALTH CARE EDUCATION/TRAINING PROGRAM

## 2020-06-06 PROCEDURE — 00000146 ZZHCL STATISTIC GLUCOSE BY METER IP

## 2020-06-06 PROCEDURE — 99231 SBSQ HOSP IP/OBS SF/LOW 25: CPT | Performed by: PHYSICIAN ASSISTANT

## 2020-06-06 PROCEDURE — 25000128 H RX IP 250 OP 636: Performed by: INTERNAL MEDICINE

## 2020-06-06 PROCEDURE — 12000000 ZZH R&B MED SURG/OB

## 2020-06-06 PROCEDURE — 36415 COLL VENOUS BLD VENIPUNCTURE: CPT | Performed by: STUDENT IN AN ORGANIZED HEALTH CARE EDUCATION/TRAINING PROGRAM

## 2020-06-06 PROCEDURE — 25000131 ZZH RX MED GY IP 250 OP 636 PS 637: Mod: GY | Performed by: PHYSICIAN ASSISTANT

## 2020-06-06 RX ORDER — DEXTROSE, SODIUM CHLORIDE, SODIUM LACTATE, POTASSIUM CHLORIDE, AND CALCIUM CHLORIDE 5; .6; .31; .03; .02 G/100ML; G/100ML; G/100ML; G/100ML; G/100ML
INJECTION, SOLUTION INTRAVENOUS CONTINUOUS
Status: DISCONTINUED | OUTPATIENT
Start: 2020-06-06 | End: 2020-06-07

## 2020-06-06 RX ORDER — BISACODYL 10 MG
10 SUPPOSITORY, RECTAL RECTAL ONCE
Status: COMPLETED | OUTPATIENT
Start: 2020-06-06 | End: 2020-06-06

## 2020-06-06 RX ADMIN — POTASSIUM CHLORIDE 20 MEQ: 1500 TABLET, EXTENDED RELEASE ORAL at 14:16

## 2020-06-06 RX ADMIN — INSULIN ASPART 1 UNITS: 100 INJECTION, SOLUTION INTRAVENOUS; SUBCUTANEOUS at 14:27

## 2020-06-06 RX ADMIN — HYDROMORPHONE HYDROCHLORIDE 0.5 MG: 1 INJECTION, SOLUTION INTRAMUSCULAR; INTRAVENOUS; SUBCUTANEOUS at 00:07

## 2020-06-06 RX ADMIN — INSULIN ASPART 1 UNITS: 100 INJECTION, SOLUTION INTRAVENOUS; SUBCUTANEOUS at 18:22

## 2020-06-06 RX ADMIN — HYDROMORPHONE HYDROCHLORIDE 0.5 MG: 1 INJECTION, SOLUTION INTRAMUSCULAR; INTRAVENOUS; SUBCUTANEOUS at 20:58

## 2020-06-06 RX ADMIN — HYDROMORPHONE HYDROCHLORIDE 0.5 MG: 1 INJECTION, SOLUTION INTRAMUSCULAR; INTRAVENOUS; SUBCUTANEOUS at 18:22

## 2020-06-06 RX ADMIN — HYDROMORPHONE HYDROCHLORIDE 0.5 MG: 1 INJECTION, SOLUTION INTRAMUSCULAR; INTRAVENOUS; SUBCUTANEOUS at 04:29

## 2020-06-06 RX ADMIN — HYDROMORPHONE HYDROCHLORIDE 0.5 MG: 1 INJECTION, SOLUTION INTRAMUSCULAR; INTRAVENOUS; SUBCUTANEOUS at 09:07

## 2020-06-06 RX ADMIN — DEXTROSE 15 G: 15 GEL ORAL at 02:21

## 2020-06-06 RX ADMIN — HYDROMORPHONE HYDROCHLORIDE 0.5 MG: 1 INJECTION, SOLUTION INTRAMUSCULAR; INTRAVENOUS; SUBCUTANEOUS at 14:16

## 2020-06-06 RX ADMIN — INSULIN ASPART 1 UNITS: 100 INJECTION, SOLUTION INTRAVENOUS; SUBCUTANEOUS at 21:48

## 2020-06-06 RX ADMIN — LORAZEPAM 0.5 MG: 2 INJECTION INTRAMUSCULAR; INTRAVENOUS at 05:07

## 2020-06-06 RX ADMIN — HYDROMORPHONE HYDROCHLORIDE 0.5 MG: 1 INJECTION, SOLUTION INTRAMUSCULAR; INTRAVENOUS; SUBCUTANEOUS at 12:02

## 2020-06-06 RX ADMIN — PANTOPRAZOLE SODIUM 40 MG: 40 INJECTION, POWDER, FOR SOLUTION INTRAVENOUS at 21:41

## 2020-06-06 RX ADMIN — LORAZEPAM 0.5 MG: 2 INJECTION INTRAMUSCULAR; INTRAVENOUS at 21:41

## 2020-06-06 RX ADMIN — METHADONE HYDROCHLORIDE 10 MG: 10 INJECTION, SOLUTION INTRAMUSCULAR; INTRAVENOUS; SUBCUTANEOUS at 18:25

## 2020-06-06 RX ADMIN — METHADONE HYDROCHLORIDE 10 MG: 10 INJECTION, SOLUTION INTRAMUSCULAR; INTRAVENOUS; SUBCUTANEOUS at 06:10

## 2020-06-06 RX ADMIN — SODIUM CHLORIDE, SODIUM LACTATE, POTASSIUM CHLORIDE, CALCIUM CHLORIDE AND DEXTROSE MONOHYDRATE: 5; 600; 310; 30; 20 INJECTION, SOLUTION INTRAVENOUS at 21:47

## 2020-06-06 RX ADMIN — SODIUM CHLORIDE, SODIUM LACTATE, POTASSIUM CHLORIDE, CALCIUM CHLORIDE AND DEXTROSE MONOHYDRATE: 5; 600; 310; 30; 20 INJECTION, SOLUTION INTRAVENOUS at 12:11

## 2020-06-06 RX ADMIN — HYDROMORPHONE HYDROCHLORIDE 0.5 MG: 1 INJECTION, SOLUTION INTRAMUSCULAR; INTRAVENOUS; SUBCUTANEOUS at 16:10

## 2020-06-06 RX ADMIN — LORAZEPAM 0.5 MG: 2 INJECTION INTRAMUSCULAR; INTRAVENOUS at 16:40

## 2020-06-06 RX ADMIN — SODIUM CHLORIDE, SODIUM LACTATE, POTASSIUM CHLORIDE, CALCIUM CHLORIDE AND DEXTROSE MONOHYDRATE: 5; 600; 310; 30; 20 INJECTION, SOLUTION INTRAVENOUS at 02:28

## 2020-06-06 RX ADMIN — POTASSIUM CHLORIDE 40 MEQ: 1500 TABLET, EXTENDED RELEASE ORAL at 12:02

## 2020-06-06 RX ADMIN — BISACODYL 10 MG: 10 SUPPOSITORY RECTAL at 09:07

## 2020-06-06 RX ADMIN — HYDROMORPHONE HYDROCHLORIDE 0.5 MG: 1 INJECTION, SOLUTION INTRAMUSCULAR; INTRAVENOUS; SUBCUTANEOUS at 02:22

## 2020-06-06 ASSESSMENT — ACTIVITIES OF DAILY LIVING (ADL)
ADLS_ACUITY_SCORE: 22
ADLS_ACUITY_SCORE: 23
ADLS_ACUITY_SCORE: 22
ADLS_ACUITY_SCORE: 25
ADLS_ACUITY_SCORE: 25
ADLS_ACUITY_SCORE: 22

## 2020-06-06 ASSESSMENT — MIFFLIN-ST. JEOR: SCORE: 1532.93

## 2020-06-06 NOTE — PLAN OF CARE
Discharge Planner PT   Patient plan for discharge: home  Current status: PT orders received and chart reviewed. Patient is 65 YO F admitted with SBO that is being managed non-operatively. She has extensive PMH and multiple recent hospitalizations, but at baseline she is electric wheelchair dependent and modified independent with transfers. PT evaluation completed. Patient is currently limited by 8-9/10 pain in her back and abdomen. She demonstrated supine <> sit with SBA and increased time. Patient displayed ability to scoot on edge of bed, but declined transfer out of bed due to not having her normal equipment. Patient left in supine at end of session with bed alarm activated and all extremities elevated on pillows.   Barriers to return to prior living situation: none anticipated  Recommendations for discharge: home  Rationale for recommendations: After discussion, patient has extensive equipment at home that cannot be replicated with hospital equipment. She demonstrated bed mobility and scooting without physical assist. Anticipate with improvement in pain, patient will be able to transfer into her wheelchair at home and she is currently declining any post-acute therapy services.        Entered by: Rachael Fajardo 06/06/2020 11:59 AM

## 2020-06-06 NOTE — PLAN OF CARE
Discharge Planner OT   Patient plan for discharge: Home  Current status: OT order received and chart reviewed. Per evaluating physical therapy, pt has no occupational therapy needs. Pt has recommended self-care adaptive equipment. OT will sign off.  Barriers to return to prior living situation: Per PT, none anticipated  Recommendations for discharge: Per PT, home  Rationale for recommendations: Defer to PT       Entered by: Wanda Araujo 06/06/2020 3:57 PM

## 2020-06-06 NOTE — PROGRESS NOTES
[facility]    Hospitalist Progress Note    Assessment & Plan   Bhavani White is a 64 year old female with PMHx including extensive surgical hx, morbid obesity with BMI of nearly 60, hypertension, dyslipidemia, DM II, asthma, PUD, chronic pain for which she is on methadone, ANIYA, neurogenic bladder dt hx of spinal stenosis and hypothyroidism.     She has a hx of emergent repair of perforated duodenal ulcer 12/29/2019 with subsequent prolonged hospitalizations for intra-abdominal infections requiring exploratory laparotomy, TRUONG, drain placement with perforation, and wound vac, readmission 3/13/20-  3/16/20 for sepsis and intra-abdominal infections and abdominal wall cellulitis which were treated with antibiotics.      She presented to [facility] ED on 6/3/20 with a 3 day hx of nausea/vomiting and abdominal pain. Was found to have a proximal small bowel obstruction. Was transferred to [facility] on 6/3/2020 in light of her complex surgical history.      Proximal small bowel obstruction  Presented with 3-day hx of nausea, vomiting, and abdominal pain. Endorsed severe, diffuse abdominal pain, worst over epigastrium and upper quadrants. Last bowel movement was 3 days PTA. No flatus, hematochezia or melena. Denied fevers/chills.  CT abd/pelvis in ED showed a small bowel obstruction involving the proximal jejunum with transition point in the midline upper abdomen. NGT placed in ED with 1500 ml greenish/yellow output. Made NPO on admission. Seen by general surgery service -- felt no emergent surgical intervention was needed and advised to continue conservative cares with decompression and NPO status. No clear indication for antibiotics. Tolerated clamping trials and NGT was removed on 6/6 per surgery.   -- appreciate assistance with care per general surgery service  -- cont clear liquids, advance to full liquids as tolerated  -- if pain worsening, general surgery to consider contrast challenge      Mild hypoxia:  Resolved  Hx of hypoxia during 3/2020 admission  Chronic Diastolic CHF, without acute exacerbation this stay  Hospitalization in 3/2020 was complicated by hypoxia -- CT at that time showed pleural effusion attibuted to CHF exacerbation. Had reduced EF in 12/2019 during hospitalization with acute illness. Repeat echo in 1/2020 showed EF had normalized. Echo in 3/2020 showed EF 60-65% with grade II diastolic dysfunction. On daily Lasix at home. Noted to require 2L NC this hospital stay but hasn't appeared volume overloaded.  -- cont pulmonary toilet  -- resume Lasix in coming days as needed  -- has been weaned off O2      Chronic Anemia  Hgb stable at 11 this stay    Morbid Obesity   ANIYA  Intermittently adherent to CPAP.     Chronic pain   Neurogenic bladder with chronic suprapubic catheter  Has severe spinal stenosis and takes Methadone as an outpatient.   Was reportedly wheelchair bound prior to 12/2019.   Has a suprapubic catheter in place for chronic retention.  [PTA meds: Cymbalta 120mg daily, gabapentin 1600mg TID, methadone 20mg BID, trazodone 100mg HS, hydroxyzine 50mg BID prn, tizanidine 4mg TID prn]  Most meds held on admission given inability to take po dt SBO. Appreciate input per pain mgmt team in regards to managing pain while NPO -- methadone changed from po to IV  -- if she tolerates po intake today, can resume oral pain meds tomorrow -- patient okay with this plan    DM II  A1C 7.1 in 1/2020. PTA meds: metformin XR 500mg BID  Managing with sliding scale insulin while NPO this stay.   Had episode of hypoglycemia on 6/6 AM, prompting addition of D5W to IVFs.       Hypertension  Dyslipidemia  [PTA meds amlodipine 5mg daily, lisinopril 5mg daily, Toprol-XL 12.5mg daily, rosuvastatin 5mg HS]  -- meds held given admission with SBO, has IV metoprolol available as needed    Hypothyroid  [PTA levothyroxine 88mcg daily]  -- on hold while NPO -- resume when taking po     FEN: cont D5LR @100ml/h for now, symone  stable, clear liquid diet -- okay to advance to full liquids as tolerating  DVT Prophylaxis: PCDs  Code Status: Full Code    Disposition: Discharge pending resolution of SBO and ability to tolerat po intake -- likely 1-2 days still. .    Jaja Granger    Interval History   Was hypoglycemic overnight, D5 added to IVFs. Seen this afternoon. Resting comfortably. Tolerating clear liquids thus far. No n/v. Passing flatus and said she had BM earlier. Still using IV dilaudid for pain. Denies cp/sob/cough.     -Data reviewed today: I reviewed all new labs and imaging results over the last 24 hours. I personally reviewed no images or EKG's today.    Physical Exam   Temp: 98  F (36.7  C) Temp src: Oral BP: (!) 150/79   Heart Rate: 64 Resp: 16 SpO2: 96 % O2 Device: None (Room air)    Vitals:    06/04/20 0620 06/05/20 0539 06/06/20 0626   Weight: 95.3 kg (210 lb 1.6 oz) 96 kg (211 lb 10.3 oz) 94.5 kg (208 lb 6.4 oz)     Vital Signs with Ranges  Temp:  [97.9  F (36.6  C)-98.1  F (36.7  C)] 98  F (36.7  C)  Heart Rate:  [58-73] 64  Resp:  [16] 16  BP: (150-173)/(70-89) 150/79  SpO2:  [96 %-99 %] 96 %  I/O last 3 completed shifts:  In: 600 [I.V.:600]  Out: 1275 [Urine:1275]    Constitutional: Resting comfortably, alert and answering questions appropriately, NAD  Respiratory: CTAB, no wheeze/rales/rhonchi, no increased work of breathing  Cardiovascular: HRRR, no MGR, no LE edema  GI: S, mildly TTP through upper quadrants, ND, +scattered BS  Skin/Integumen: warm/dry  Other:      Medications     dextrose 5% lactated ringers 100 mL/hr at 06/06/20 1211       insulin aspart  1-6 Units Subcutaneous Q4H     lidocaine  1 patch Transdermal Q24H     lidocaine   Transdermal Q8H     methadone  10 mg Intravenous Q12H     pantoprazole (PROTONIX) IV  40 mg Intravenous Q24H     pink lady enema  286 mL Rectal Once     sodium chloride (PF)  3 mL Intracatheter Q8H       Data   Recent Labs   Lab 06/06/20  0750 06/05/20  0756 06/04/20  0732  06/03/20  1442   WBC 10.0 8.6 10.5 15.3*   HGB 11.5* 11.6* 11.4* 13.4   MCV 72* 74* 74* 74*    264 272 342    142 142 136   POTASSIUM 3.3* 3.5 3.5 3.7   CHLORIDE 101 103 102 95   CO2 33* 34* 36* 35*   BUN 13 20 21 19   CR 0.64 0.74 0.78 0.68   ANIONGAP 4 5 4 6   DORY 9.1 9.8 9.8 11.0*   * 79 95 151*   ALBUMIN  --   --  3.0* 4.0   PROTTOTAL  --   --  6.6* 9.3*   BILITOTAL  --   --  0.5 0.4   ALKPHOS  --   --  79 107   ALT  --   --  21 24   AST  --   --  16 20   LIPASE  --   --   --  34*   TROPI  --   --   --  <0.015       No results found for this or any previous visit (from the past 24 hour(s)).

## 2020-06-06 NOTE — PLAN OF CARE
Bp on hypertensive side. Pain fairly controlled. Flatus+ however no BM yet, suppository given and awaiting results. NG tube removed. Pt tolerating clears. Suprapubic catheter with adequate uop. Electrolytes repletion complete. Pt declined cares/attempts to get up to the chair. Will continue to monitor.

## 2020-06-06 NOTE — PROVIDER NOTIFICATION
MD Notification    Notified Person: MD    Notified Person Name: Dr. Kramer    Notification Date/Time: 6/6/2020  0215    Notification Interaction: paged    Purpose of Notification: Patient hypoglycemic    Orders Received: changed lactated ringer IV therapy to Lactated ringers with 5% dextrose    Comments: also gave oral glucose solution.

## 2020-06-06 NOTE — PLAN OF CARE
A+O x 4. Bed bound, using lift to transfer. Clear liquid diet. Been checking stomach residuals every 4 hours through NG tube to low intermittent suction for 30 minutes. Residuals have been lower than determined for continuous suctioning. Patient's BG had dropped during the night to 62. One packet of oral glucose and an apple juice was used to help bring it back up. Also, the LR infusion was changed to LR w/5% dextrose bring her glucose to 113. Patient has been using the IV dilaudid every 2 hours for pain. She has refused using tylenol and the lidocaine patches. Suprapubic catheter is patent. SBP has been high, but not high enough to use PRN hydralazine. Discharge still to be determined.

## 2020-06-06 NOTE — PROVIDER NOTIFICATION
Brief update:    Page regarding hypoglycemia    D5 added to LR at 100/h    Rogers Kramer MD  2:18 AM

## 2020-06-06 NOTE — PROGRESS NOTES
06/06/20 1115   Quick Adds   Type of Visit Initial PT Evaluation   Living Environment   Lives With spouse   Living Arrangements apartment   Home Accessibility no concerns   Transportation Anticipated health plan transportation   Self-Care   Usual Activity Tolerance moderate   Current Activity Tolerance poor   Regular Exercise No   Equipment Currently Used at Home wheelchair, power;grab bar, toilet;grab bar, tub/shower;glucometer;hospital bed;tub bench;other (see comments);raised toilet  (trapeze)   Functional Level Prior   Ambulation 4-->completely dependent   Transferring 1-->assistive equipment   Toileting 1-->assistive equipment   Bathing 3-->assistive equipment and person   Fall history within last six months no   Prior Functional Level Comment Patient reports she pivot transfers to her electric wheelchair on her right leg with modified independence at home.    General Information   Onset of Illness/Injury or Date of Surgery - Date 06/03/20   Referring Physician Juan Boyle PA-C     Patient/Family Goals Statement to go home tomorrow   Pertinent History of Current Problem (include personal factors and/or comorbidities that impact the POC) Patient is 65 YO F admitted with diagnosis of SBO. PMH: 3 recent hospitalizations, chronic pain, obesity, DM, hypertension, spinal stenosis and neurogenic bladder   Precautions/Limitations fall precautions   General Observations Patient sleeping upon arrival, awakened easily, agreeable to PT evaluation   Cognitive Status Examination   Orientation orientation to person, place and time   Level of Consciousness alert   Follows Commands and Answers Questions able to follow multistep instructions   Personal Safety and Judgment intact   Memory intact   Cognitive Comment Patient just waking up upon arrival, but with time was alert and cooperative throughout session. Excellent awareness into chronic physical deficits   Pain Assessment   Patient Currently in Pain Yes, see Vital  Sign flowsheet  (8/10 abdominal pain)   Posture    Posture Protracted shoulders   Posture Comments L LE deformity with valus and leg length discrepancy   Range of Motion (ROM)   ROM Comment B LE ROM WFL to perform bed mobility    Strength   Strength Comments Generalized weakness observed during mobility   Bed Mobility   Bed Mobility Comments Supine <>  sit from head of bed elevated and use of bed rails with SBA; Patient able to scoot herself in bed in supine with SBA.    Transfer Skills   Transfer Comments Patient verbalized her technique for transferring to her wheelchair with scooting on buttocks. Patient able to demonstrate scooting to edge of bed and on side of bed, but declined attempt to transfer to a chair. Patient noting that due to significant difference in chair set up, she would not feet comfortable attempting this without her personal chair present. Patient also declined getting into chair with use of lift at time of eval.    Gait   Gait Comments Patient is non-ambulatory.    Balance   Balance Comments Good sitting balance on edge of bed, no loss of balance with scooting.    Sensory Examination   Sensory Perception Comments Chronic numbness and tingling in hands and feet, unchanged from baseline   Clinical Impression   Criteria for Skilled Therapeutic Intervention yes, treatment indicated   PT Diagnosis impaired transfers   Influenced by the following impairments generalized weakness, pain   Functional limitations due to impairments increased difficulty with bed mobility and bed <> chair transfers   Clinical Presentation Evolving/Changing   Clinical Presentation Rationale uncontrolled pain   Clinical Decision Making (Complexity) Low complexity   Therapy Frequency 3x/week   Predicted Duration of Therapy Intervention (days/wks) 3 treatment days   Anticipated Discharge Disposition Home   Risk & Benefits of therapy have been explained Yes   Patient, Family & other staff in agreement with plan of care Yes  "  Clinical Impression Comments Patient has custom electric wheelchair at home and patient does not feel safe attempting to transfer into hospital wheelchairs, but states she has no concern about being able to return home.    [facility] TM \"6 Clicks\"   2016, Trustees of [facility], under license to [employer].  All rights reserved.   6 Clicks Short Forms Basic Mobility Inpatient Short Form   [facility]  \"6 Clicks\" V.2 Basic Mobility Inpatient Short Form   1. Turning from your back to your side while in a flat bed without using bedrails? 4 - None   2. Moving from lying on your back to sitting on the side of a flat bed without using bedrails? 4 - None   3. Moving to and from a bed to a chair (including a wheelchair)? 2 - A Lot   4. Standing up from a chair using your arms (e.g., wheelchair, or bedside chair)? 1 - Total   5. To walk in hospital room? 1 - Total   6. Climbing 3-5 steps with a railing? 1 - Total   Basic Mobility Raw Score (Score out of 24.Lower scores equate to lower levels of function) 13   Total Evaluation Time   Total Evaluation Time (Minutes) 25     "

## 2020-06-06 NOTE — PROGRESS NOTES
"'s/70's; HR 60's; afebrile; O2sats 90's on RA.  NG was removed this am - tolerating clear liquids well with no N/V.  Patient was c/o constipation, received suppository and had a small BM after - refusing enema at this time: \"I'll do it after I have my dinner.\"  Suprapubic catheter is intact and patent with adequate UO.  K+ 3.8 after replacement.  Patient continues to request dilaudid Q2hrs for back pain.  Patient is A&Ox4; directs cares; refused to reposition or get OOB this evening.  Discharge once SBO is resolved and patient is tolerating po - likely in 1-2 days.     "

## 2020-06-07 ENCOUNTER — APPOINTMENT (OUTPATIENT)
Dept: GENERAL RADIOLOGY | Facility: CLINIC | Age: 65
DRG: 389 | End: 2020-06-07
Attending: PHYSICIAN ASSISTANT
Payer: MEDICARE

## 2020-06-07 LAB
ANION GAP SERPL CALCULATED.3IONS-SCNC: 5 MMOL/L (ref 3–14)
BUN SERPL-MCNC: 6 MG/DL (ref 7–30)
CALCIUM SERPL-MCNC: 9.4 MG/DL (ref 8.5–10.1)
CHLORIDE SERPL-SCNC: 104 MMOL/L (ref 94–109)
CO2 SERPL-SCNC: 30 MMOL/L (ref 20–32)
CREAT SERPL-MCNC: 0.7 MG/DL (ref 0.52–1.04)
GFR SERPL CREATININE-BSD FRML MDRD: >90 ML/MIN/{1.73_M2}
GLUCOSE BLDC GLUCOMTR-MCNC: 106 MG/DL (ref 70–99)
GLUCOSE BLDC GLUCOMTR-MCNC: 116 MG/DL (ref 70–99)
GLUCOSE BLDC GLUCOMTR-MCNC: 117 MG/DL (ref 70–99)
GLUCOSE BLDC GLUCOMTR-MCNC: 119 MG/DL (ref 70–99)
GLUCOSE SERPL-MCNC: 107 MG/DL (ref 70–99)
POTASSIUM SERPL-SCNC: 3.4 MMOL/L (ref 3.4–5.3)
SODIUM SERPL-SCNC: 139 MMOL/L (ref 133–144)

## 2020-06-07 PROCEDURE — 80048 BASIC METABOLIC PNL TOTAL CA: CPT | Performed by: INTERNAL MEDICINE

## 2020-06-07 PROCEDURE — 12000000 ZZH R&B MED SURG/OB

## 2020-06-07 PROCEDURE — 40000986 XR ABDOMEN 1 VW

## 2020-06-07 PROCEDURE — 00000146 ZZHCL STATISTIC GLUCOSE BY METER IP

## 2020-06-07 PROCEDURE — G0463 HOSPITAL OUTPT CLINIC VISIT: HCPCS

## 2020-06-07 PROCEDURE — 99231 SBSQ HOSP IP/OBS SF/LOW 25: CPT | Performed by: PHYSICIAN ASSISTANT

## 2020-06-07 PROCEDURE — 25000132 ZZH RX MED GY IP 250 OP 250 PS 637: Mod: GY | Performed by: HOSPITALIST

## 2020-06-07 PROCEDURE — 25000132 ZZH RX MED GY IP 250 OP 250 PS 637: Mod: GY | Performed by: PHYSICIAN ASSISTANT

## 2020-06-07 PROCEDURE — 36415 COLL VENOUS BLD VENIPUNCTURE: CPT | Performed by: INTERNAL MEDICINE

## 2020-06-07 PROCEDURE — 25000128 H RX IP 250 OP 636

## 2020-06-07 PROCEDURE — 25000132 ZZH RX MED GY IP 250 OP 250 PS 637: Mod: GY | Performed by: INTERNAL MEDICINE

## 2020-06-07 PROCEDURE — 99232 SBSQ HOSP IP/OBS MODERATE 35: CPT | Performed by: INTERNAL MEDICINE

## 2020-06-07 RX ORDER — DULOXETIN HYDROCHLORIDE 60 MG/1
120 CAPSULE, DELAYED RELEASE ORAL EVERY MORNING
Status: DISCONTINUED | OUTPATIENT
Start: 2020-06-07 | End: 2020-06-08

## 2020-06-07 RX ORDER — LEVOTHYROXINE SODIUM 88 UG/1
88 TABLET ORAL
Status: DISCONTINUED | OUTPATIENT
Start: 2020-06-07 | End: 2020-06-08

## 2020-06-07 RX ORDER — METHADONE HYDROCHLORIDE 10 MG/1
20 TABLET ORAL 2 TIMES DAILY
Status: DISCONTINUED | OUTPATIENT
Start: 2020-06-07 | End: 2020-06-08

## 2020-06-07 RX ORDER — TRAZODONE HYDROCHLORIDE 100 MG/1
100 TABLET ORAL AT BEDTIME
Status: DISCONTINUED | OUTPATIENT
Start: 2020-06-07 | End: 2020-06-08

## 2020-06-07 RX ORDER — GABAPENTIN 800 MG/1
1600 TABLET ORAL 3 TIMES DAILY
Status: DISCONTINUED | OUTPATIENT
Start: 2020-06-07 | End: 2020-06-08

## 2020-06-07 RX ORDER — LISINOPRIL 5 MG/1
5 TABLET ORAL DAILY
Status: DISCONTINUED | OUTPATIENT
Start: 2020-06-07 | End: 2020-06-10

## 2020-06-07 RX ORDER — PANTOPRAZOLE SODIUM 40 MG/1
40 TABLET, DELAYED RELEASE ORAL
Status: DISCONTINUED | OUTPATIENT
Start: 2020-06-07 | End: 2020-06-08

## 2020-06-07 RX ADMIN — DOCUSATE SODIUM 286 ML: 50 LIQUID ORAL at 11:19

## 2020-06-07 RX ADMIN — HYDROMORPHONE HYDROCHLORIDE 0.5 MG: 1 INJECTION, SOLUTION INTRAMUSCULAR; INTRAVENOUS; SUBCUTANEOUS at 20:29

## 2020-06-07 RX ADMIN — LEVOTHYROXINE SODIUM 88 MCG: 88 TABLET ORAL at 11:17

## 2020-06-07 RX ADMIN — MICONAZOLE NITRATE: 20 POWDER TOPICAL at 20:33

## 2020-06-07 RX ADMIN — HYDROMORPHONE HYDROCHLORIDE 0.5 MG: 1 INJECTION, SOLUTION INTRAMUSCULAR; INTRAVENOUS; SUBCUTANEOUS at 02:50

## 2020-06-07 RX ADMIN — GABAPENTIN 1600 MG: 800 TABLET, FILM COATED ORAL at 18:07

## 2020-06-07 RX ADMIN — HYDROMORPHONE HYDROCHLORIDE 0.5 MG: 1 INJECTION, SOLUTION INTRAMUSCULAR; INTRAVENOUS; SUBCUTANEOUS at 23:09

## 2020-06-07 RX ADMIN — DIATRIZOATE MEGLUMINE AND DIATRIZOATE SODIUM 90 ML: 660; 100 SOLUTION ORAL; RECTAL at 14:01

## 2020-06-07 RX ADMIN — HYDROMORPHONE HYDROCHLORIDE 0.5 MG: 1 INJECTION, SOLUTION INTRAMUSCULAR; INTRAVENOUS; SUBCUTANEOUS at 16:18

## 2020-06-07 RX ADMIN — HYDROMORPHONE HYDROCHLORIDE 0.5 MG: 1 INJECTION, SOLUTION INTRAMUSCULAR; INTRAVENOUS; SUBCUTANEOUS at 09:23

## 2020-06-07 RX ADMIN — LISINOPRIL 5 MG: 5 TABLET ORAL at 09:23

## 2020-06-07 RX ADMIN — METOPROLOL SUCCINATE 12.5 MG: 25 TABLET, EXTENDED RELEASE ORAL at 09:23

## 2020-06-07 RX ADMIN — HYDROMORPHONE HYDROCHLORIDE 0.5 MG: 1 INJECTION, SOLUTION INTRAMUSCULAR; INTRAVENOUS; SUBCUTANEOUS at 05:29

## 2020-06-07 RX ADMIN — METHADONE HYDROCHLORIDE 10 MG: 10 INJECTION, SOLUTION INTRAMUSCULAR; INTRAVENOUS; SUBCUTANEOUS at 06:09

## 2020-06-07 RX ADMIN — HYDROMORPHONE HYDROCHLORIDE 0.5 MG: 1 INJECTION, SOLUTION INTRAMUSCULAR; INTRAVENOUS; SUBCUTANEOUS at 11:18

## 2020-06-07 RX ADMIN — METHADONE HYDROCHLORIDE 20 MG: 10 TABLET ORAL at 21:51

## 2020-06-07 RX ADMIN — PANTOPRAZOLE SODIUM 40 MG: 40 TABLET, DELAYED RELEASE ORAL at 11:18

## 2020-06-07 RX ADMIN — GABAPENTIN 1600 MG: 800 TABLET, FILM COATED ORAL at 11:17

## 2020-06-07 RX ADMIN — HYDROMORPHONE HYDROCHLORIDE 0.5 MG: 1 INJECTION, SOLUTION INTRAMUSCULAR; INTRAVENOUS; SUBCUTANEOUS at 00:52

## 2020-06-07 RX ADMIN — LORAZEPAM 0.5 MG: 2 INJECTION INTRAMUSCULAR; INTRAVENOUS at 06:41

## 2020-06-07 RX ADMIN — LORAZEPAM 0.5 MG: 2 INJECTION INTRAMUSCULAR; INTRAVENOUS at 02:33

## 2020-06-07 RX ADMIN — HYDROMORPHONE HYDROCHLORIDE 0.5 MG: 1 INJECTION, SOLUTION INTRAMUSCULAR; INTRAVENOUS; SUBCUTANEOUS at 18:12

## 2020-06-07 RX ADMIN — HYDROMORPHONE HYDROCHLORIDE 0.5 MG: 1 INJECTION, SOLUTION INTRAMUSCULAR; INTRAVENOUS; SUBCUTANEOUS at 14:02

## 2020-06-07 RX ADMIN — DULOXETINE 120 MG: 60 CAPSULE, DELAYED RELEASE ORAL at 09:23

## 2020-06-07 RX ADMIN — TRAZODONE HYDROCHLORIDE 100 MG: 100 TABLET ORAL at 21:51

## 2020-06-07 ASSESSMENT — ACTIVITIES OF DAILY LIVING (ADL)
ADLS_ACUITY_SCORE: 25

## 2020-06-07 NOTE — PROGRESS NOTES
[facility]    Hospitalist Progress Note    Assessment & Plan   Bhavani White is a 64 year old female with PMHx including extensive surgical hx, morbid obesity with BMI of nearly 60, hypertension, dyslipidemia, DM II, asthma, PUD, chronic pain for which she is on methadone, ANIYA, neurogenic bladder dt hx of spinal stenosis and hypothyroidism.     She has a hx of emergent repair of perforated duodenal ulcer 12/29/2019 with subsequent prolonged hospitalizations for intra-abdominal infections requiring exploratory laparotomy, TRUONG, drain placement with perforation, and wound vac, readmission 3/13/20-  3/16/20 for sepsis and intra-abdominal infections and abdominal wall cellulitis which were treated with antibiotics.      She presented to [facility] ED on 6/3/20 with a 3 day hx of nausea/vomiting and abdominal pain. Was found to have a proximal small bowel obstruction. Was transferred to [facility] on 6/3/2020 in light of her complex surgical history.      Proximal small bowel obstruction  Presented with 3-day hx of nausea, vomiting, and abdominal pain. Endorsed severe, diffuse abdominal pain, worst over epigastrium and upper quadrants. Last bowel movement was 3 days PTA. No flatus, hematochezia or melena. Denied fevers/chills.  CT abd/pelvis in ED showed a small bowel obstruction involving the proximal jejunum with transition point in the midline upper abdomen. NGT placed in ED with 1500 ml greenish/yellow output. Made NPO on admission. Seen by general surgery service -- felt no emergent surgical intervention was needed and advised to continue conservative cares with decompression and NPO status. No clear indication for antibiotics. Tolerated clamping trials and NGT was removed on 6/6 per surgery.   -- appreciate assistance with care per general surgery service  -- tolerating full liquids -- advance to low fiber diet today  -- given Pink lady enema on 6/7 per surgery recs given previously noted large stool burden       Mild hypoxia: Resolved  Hx of hypoxia during 3/2020 admission  Chronic Diastolic CHF, without acute exacerbation this stay  Hospitalization in 3/2020 was complicated by hypoxia -- CT at that time showed pleural effusion attibuted to CHF exacerbation. Had reduced EF in 12/2019 during hospitalization with acute illness. Repeat echo in 1/2020 showed EF had normalized. Echo in 3/2020 showed EF 60-65% with grade II diastolic dysfunction. On daily Lasix at home. Noted to require 2L NC this hospital stay but hasn't appeared volume overloaded.  -- cont pulmonary toilet  -- resume Lasix at discharge or sooner if needed  -- has been weaned off O2      Chronic Anemia  Hgb stable at 11 this stay    Morbid Obesity   ANIYA  Intermittently adherent to CPAP.     Chronic pain   Neurogenic bladder with chronic suprapubic catheter  Has severe spinal stenosis and takes Methadone as an outpatient.   Was reportedly wheelchair bound prior to 12/2019.   Has a suprapubic catheter in place for chronic retention.  [PTA meds: Cymbalta 120mg daily, gabapentin 1600mg TID, methadone 20mg BID, trazodone 100mg HS, hydroxyzine 50mg BID prn, tizanidine 4mg TID prn]  Most meds held on admission given inability to take po dt SBO. Appreciate input per pain mgmt team in regards to managing pain while NPO -- methadone changed from po to IV  -- oral pain meds resumed on 6/7 per home regimen  -- no PT/OT needs identified this stay    DM II  A1C 7.1 in 1/2020. PTA meds: metformin XR 500mg BID  Managing with sliding scale insulin while NPO this stay.   Had episode of hypoglycemia on 6/6 AM, prompting addition of D5W to IVFs.     -- oral intake improving -- IVFs stopped on 6/7, sliding scale insulin changed to AC/HS  -- resume metformin at discharge    Recent Labs   Lab 06/07/20  0755 06/07/20  0732 06/06/20  2221 06/06/20  2138 06/06/20  1814 06/06/20  1426 06/06/20  1133 06/06/20  0750  06/05/20  0756  06/04/20  0725  06/03/20  1442   *  --   --   --   " --   --   --  118*  --  79  --  95  --  151*   BGM  --  106* 148* 151* 158* 145* 116*  --    < >  --    < >  --    < >  --     < > = values in this interval not displayed.       Hypertension  Dyslipidemia  PTA meds: amlodipine 5mg daily, lisinopril 5mg daily, Toprol-XL 12.5mg daily, rosuvastatin 5mg HS  Meds held given admission with SBO  -- lisinopril and metoprolol XL resumed on 6/7, can add back amlodipine as needed    Hypothyroid  PTA meds:  levothyroxine 88mcg daily  -- held while NPO -- resumed on 6/7     FEN: discontinue IVFs as now tolerating po, lytes stable, low fiber diet today  DVT Prophylaxis: PCDs  Code Status: Full Code    Disposition: Advanced to low fiber diet today. Anticipate discharge home tomorrow pending po intake and recommendations per surgery.     Jaja Wandamattie Granger    Interval History   Uneventful night. Tolerated clear and full liquids -- advanced to low fiber diet per surgery this morning. Seen this morning. Endorsing pain \"everywhere\" but not specifically her abdomen. Denies n/v. No cp/sob/cough.     -Data reviewed today: I reviewed all new labs and imaging results over the last 24 hours. I personally reviewed no images or EKG's today.    Physical Exam   Temp: 98.4  F (36.9  C) Temp src: Oral BP: (!) 147/70 Pulse: 68 Heart Rate: 60 Resp: 16 SpO2: 96 % O2 Device: None (Room air)    Vitals:    06/04/20 0620 06/05/20 0539 06/06/20 0626   Weight: 95.3 kg (210 lb 1.6 oz) 96 kg (211 lb 10.3 oz) 94.5 kg (208 lb 6.4 oz)     Vital Signs with Ranges  Temp:  [97.8  F (36.6  C)-98.6  F (37  C)] 98.4  F (36.9  C)  Pulse:  [66-74] 68  Heart Rate:  [60-64] 60  Resp:  [14-16] 16  BP: (147-165)/(70-91) 147/70  SpO2:  [96 %-98 %] 96 %  I/O last 3 completed shifts:  In: 80 [P.O.:80]  Out: 2975 [Urine:2975]    Constitutional: Resting comfortably, alert and answering questions appropriately, NAD  Respiratory: CTAB, no wheeze/rales/rhonchi, no increased work of breathing  Cardiovascular: HRRR, no MGR, " no LE edema  GI: S, NT, ND, +scattered BS  Skin/Integumen: warm/dry  Other:      Medications     dextrose 5% lactated ringers 100 mL/hr at 06/06/20 2147       DULoxetine  120 mg Oral QAM     gabapentin  1,600 mg Oral TID     insulin aspart  1-7 Units Subcutaneous TID AC     insulin aspart  1-5 Units Subcutaneous At Bedtime     levothyroxine  88 mcg Oral QAM AC     lisinopril  5 mg Oral Daily     methadone  20 mg Oral BID     metoprolol succinate ER  12.5 mg Oral Daily     pantoprazole (PROTONIX) IV  40 mg Intravenous Q24H     pink lady enema  286 mL Rectal Once     sodium chloride (PF)  3 mL Intracatheter Q8H     traZODone  100 mg Oral At Bedtime       Data   Recent Labs   Lab 06/07/20  0755 06/06/20  1656 06/06/20  0750 06/05/20  0756 06/04/20  0725 06/03/20  1442   WBC  --   --  10.0 8.6 10.5 15.3*   HGB  --   --  11.5* 11.6* 11.4* 13.4   MCV  --   --  72* 74* 74* 74*   PLT  --   --  237 264 272 342     --  138 142 142 136   POTASSIUM 3.4 3.8 3.3* 3.5 3.5 3.7   CHLORIDE 104  --  101 103 102 95   CO2 30  --  33* 34* 36* 35*   BUN 6*  --  13 20 21 19   CR 0.70  --  0.64 0.74 0.78 0.68   ANIONGAP 5  --  4 5 4 6   DORY 9.4  --  9.1 9.8 9.8 11.0*   *  --  118* 79 95 151*   ALBUMIN  --   --   --   --  3.0* 4.0   PROTTOTAL  --   --   --   --  6.6* 9.3*   BILITOTAL  --   --   --   --  0.5 0.4   ALKPHOS  --   --   --   --  79 107   ALT  --   --   --   --  21 24   AST  --   --   --   --  16 20   LIPASE  --   --   --   --   --  34*   TROPI  --   --   --   --   --  <0.015       No results found for this or any previous visit (from the past 24 hour(s)).

## 2020-06-07 NOTE — PROGRESS NOTES
General Surgery - Chart Update      Patient continues to feel well and is passing gas. Received enema earlier, no stool result.    Continue with full liquid diet, do not advance to solid food yet. Give 90ml undiluted gastrografin PO now. Check abdominal XRs in 6 hours. If no contrast in colon tonight, will likely recheck XR tomorrow morning.       ALIZA SoriaC  [facility]  [phone]

## 2020-06-07 NOTE — PLAN OF CARE
A/o. Tolerating full liquids. Suprapubic catheter with adequate uop. Endorses severe back pain requiring dilaudid IV q2hrs. Enema administered without results, now doing gastrografin challenge. Will continue to monitor.

## 2020-06-07 NOTE — PLAN OF CARE
Continues to complain of chronic back pain, IV Dilaudid given PRN. Scheduled Methadone also being used. Tolerated clear liquids and advanced to full liquids. Passing flatus. Small BM yesterday after suppository. Denies nausea. Refusing repositioning. VSS. Room air. Suprapubic catheter patent with adequate urine output. Wheelchair bound, can pivot. D5LR infusing, blood sugars stable. Plan to discharge home, possibly today or tomorrow.

## 2020-06-07 NOTE — PROGRESS NOTES
[facility] Nurse Inpatient Wound Assessment   Reason for consultation: Pannus/groin irritation     Assessment  Pannus, groin wound due to Intertrigo throughout pannus and groin with mild fungal rash to left pannus. Pt has suprapubic catheter that frequently leaks and causes increased moisture to pannus/groin  Status: initial assessment    Treatment Plan  Abdominal and Groin skin breakdown: BID and PRN  1. Cleanse pannus and groin with yazmin perineal lotion and omega dry wipes/washcloths. Ensure area is completely dry  2. Dust left side of pannus, around suprapubic and groin gently rubbing in.   3. Apply Interdry AG to Right side of pannus leaving a tail outside of the body to wick moisture. Avoid using interdry over powdered areas as it does not allow it to effectively wick  4. Apply 4x4 split guaze around suprapubic  *Avoid pre moisten wipes.   *Avoid use of diaper as this traps moisture  *Use single covidien pad and limit number of linens underneath patient.    * Allow areas to air out as much as possible   Orders Written  Recommended provider order: none at this time  WO Nurse follow-up plan:weekly  Nursing to notify the Provider(s) and re-consult the WO Nurse if wound(s) deteriorates or new skin concern.    Patient History  According to provider note(s):     Bhavani White is a 64 year old female with PMHx including extensive surgical hx, morbid obesity with BMI of nearly 60, hypertension, dyslipidemia, DM II, asthma, PUD, chronic pain for which she is on methadone, ANIYA, neurogenic bladder dt hx of spinal stenosis and hypothyroidism.     Objective Data  Containment of urine/stool: Incontinence Protocol and Suprapublic catheter    Active Diet Order  Orders Placed This Encounter      Advance Diet as Tolerated: Full Liquid Diet      Output:   I/O last 3 completed shifts:  In: 80 [P.O.:80]  Out: 2975 [Urine:2975]    Risk Assessment:   Sensory Perception: 4-->no impairment  Moisture: 2-->very moist  Activity:  2-->chairfast  Mobility: 2-->very limited  Nutrition: 2-->probably inadequate  Friction and Shear: 2-->potential problem  Joe Score: 14                          Labs:   Recent Labs   Lab 06/06/20  0750  06/04/20  0725   ALBUMIN  --   --  3.0*   HGB 11.5*   < > 11.4*   WBC 10.0   < > 10.5   A1C  --   --  6.0*    < > = values in this interval not displayed.       Physical Exam  Skin inspection: focused pannus/groin    Wound Location:  Pannus/Groin  Date of last photo none taken  Wound History: Pt has suprapubic catheter that leaks causing excessive moisture  Wound description: Pt has denuded tissue across pannus and groin with satellite lesions to Left of suprapubic extending into left pannus. Tissue is moist with moderate candidiasis smell.     Interventions  Current support surface: Standard  Low air loss mattress  Current off-loading measures: Pillows  Visual inspection and assessment completed   Wound Care: completed by RN  Supplies: ordered: miconazole and interdry  Education provided: importance of repositioning, plan of care and Off-loading pressure  Discussed plan of care with Patient and Nurse    Pineda Del Castillo RN CWOCN

## 2020-06-07 NOTE — PROGRESS NOTES
"General Surgery Progress Note    Admission Date: 6/3/2020  Today's Date: 6/7/2020         Assessment:      Bhavani White is a 64 year old female with a complex surgical history admitted 6/3 with small bowel obstruction. Passing gas, NG removed yesterday. Had small BM after suppository, tolerating full liquids.          Plan:   - Continue with full liquid diet. Advance to low fiber tonight vs tomorrow. Discussed with patient that we are very cautious with her and want to advance slowly given her complex surgical history  - Pink lady enema this morning, XR on 6/4 with significant stool burden  - Nutrition consult yesterday, appreciate education  - Medical management per hospitalist. I have reordered home meds, should help with chronic back pain  - Continue working with OT, activity as able  - Hopeful discharge tomorrow if continued improvement, better bowel function        Interval History:   Afebrile overnight, VSS on room air except hypertension. Denies abdominal pain, very eager to go home. Denies nausea, tolerating full liquids. Had small BM yesterday several hours after receiving suppository. Continues to pass gas regularly. Main complaint is chronic back pain.          Physical Exam:   BP (!) 159/79 (BP Location: Right arm)   Pulse 74   Temp 98.4  F (36.9  C)   Resp 16   Ht 1.702 m (5' 7\")   Wt 94.5 kg (208 lb 6.4 oz)   SpO2 97%   BMI 32.64 kg/m    I/O last 3 completed shifts:  In: 80 [P.O.:80]  Out: 2975 [Urine:2975]  General: NAD, pleasant, alert and oriented x3  Respiratory: non-labored breathing   Abdomen: soft, no significant distention, nontender to palpation. Numerous surgical scars.    LABS:  Recent Labs   Lab Test 06/06/20  0750 06/05/20  0756 06/04/20  0725   WBC 10.0 8.6 10.5   HGB 11.5* 11.6* 11.4*   MCV 72* 74* 74*    264 272      -------------------------------    ALIZA SoriaC  [facility]  [phone]      "

## 2020-06-07 NOTE — PROGRESS NOTES
Pt is refusing to get OOB/repositioning. At risk of skin breakdown. Already has redness in abdominal folds, interdry utilized. Will order a pulsate mattress and consult WOC RN.

## 2020-06-08 ENCOUNTER — APPOINTMENT (OUTPATIENT)
Dept: CT IMAGING | Facility: CLINIC | Age: 65
DRG: 389 | End: 2020-06-08
Attending: PHYSICIAN ASSISTANT
Payer: MEDICARE

## 2020-06-08 LAB
ERYTHROCYTE [DISTWIDTH] IN BLOOD BY AUTOMATED COUNT: 20.1 % (ref 10–15)
GLUCOSE BLDC GLUCOMTR-MCNC: 106 MG/DL (ref 70–99)
GLUCOSE BLDC GLUCOMTR-MCNC: 108 MG/DL (ref 70–99)
GLUCOSE BLDC GLUCOMTR-MCNC: 131 MG/DL (ref 70–99)
GLUCOSE BLDC GLUCOMTR-MCNC: 78 MG/DL (ref 70–99)
GLUCOSE BLDC GLUCOMTR-MCNC: 86 MG/DL (ref 70–99)
HCT VFR BLD AUTO: 40.5 % (ref 35–47)
HGB BLD-MCNC: 12.5 G/DL (ref 11.7–15.7)
LACTATE BLD-SCNC: 0.7 MMOL/L (ref 0.7–2)
MCH RBC QN AUTO: 22.7 PG (ref 26.5–33)
MCHC RBC AUTO-ENTMCNC: 30.9 G/DL (ref 31.5–36.5)
MCV RBC AUTO: 74 FL (ref 78–100)
PLATELET # BLD AUTO: 243 10E9/L (ref 150–450)
RBC # BLD AUTO: 5.51 10E12/L (ref 3.8–5.2)
SMALL BOWEL ENTEROSCOPY: NORMAL
WBC # BLD AUTO: 13.2 10E9/L (ref 4–11)

## 2020-06-08 PROCEDURE — 12000000 ZZH R&B MED SURG/OB

## 2020-06-08 PROCEDURE — 25000128 H RX IP 250 OP 636: Performed by: PHYSICIAN ASSISTANT

## 2020-06-08 PROCEDURE — 25800030 ZZH RX IP 258 OP 636: Performed by: PHYSICIAN ASSISTANT

## 2020-06-08 PROCEDURE — 25000128 H RX IP 250 OP 636: Performed by: HOSPITALIST

## 2020-06-08 PROCEDURE — 99231 SBSQ HOSP IP/OBS SF/LOW 25: CPT | Performed by: SURGERY

## 2020-06-08 PROCEDURE — 85027 COMPLETE CBC AUTOMATED: CPT | Performed by: PHYSICIAN ASSISTANT

## 2020-06-08 PROCEDURE — G0500 MOD SEDAT ENDO SERVICE >5YRS: HCPCS | Performed by: INTERNAL MEDICINE

## 2020-06-08 PROCEDURE — 25000125 ZZHC RX 250: Performed by: HOSPITALIST

## 2020-06-08 PROCEDURE — 25000125 ZZHC RX 250: Performed by: PHYSICIAN ASSISTANT

## 2020-06-08 PROCEDURE — 25000132 ZZH RX MED GY IP 250 OP 250 PS 637: Mod: GY | Performed by: PHYSICIAN ASSISTANT

## 2020-06-08 PROCEDURE — 25000128 H RX IP 250 OP 636

## 2020-06-08 PROCEDURE — 25000125 ZZHC RX 250: Performed by: INTERNAL MEDICINE

## 2020-06-08 PROCEDURE — 99232 SBSQ HOSP IP/OBS MODERATE 35: CPT | Performed by: INTERNAL MEDICINE

## 2020-06-08 PROCEDURE — 36415 COLL VENOUS BLD VENIPUNCTURE: CPT | Performed by: PHYSICIAN ASSISTANT

## 2020-06-08 PROCEDURE — C9113 INJ PANTOPRAZOLE SODIUM, VIA: HCPCS | Performed by: PHYSICIAN ASSISTANT

## 2020-06-08 PROCEDURE — 00000146 ZZHCL STATISTIC GLUCOSE BY METER IP

## 2020-06-08 PROCEDURE — 0DJ08ZZ INSPECTION OF UPPER INTESTINAL TRACT, VIA NATURAL OR ARTIFICIAL OPENING ENDOSCOPIC: ICD-10-PCS | Performed by: INTERNAL MEDICINE

## 2020-06-08 PROCEDURE — 74177 CT ABD & PELVIS W/CONTRAST: CPT

## 2020-06-08 PROCEDURE — 25000128 H RX IP 250 OP 636: Performed by: INTERNAL MEDICINE

## 2020-06-08 PROCEDURE — 83605 ASSAY OF LACTIC ACID: CPT | Performed by: PHYSICIAN ASSISTANT

## 2020-06-08 PROCEDURE — 44360 SMALL BOWEL ENDOSCOPY: CPT | Performed by: INTERNAL MEDICINE

## 2020-06-08 RX ORDER — DIAZEPAM 10 MG/2ML
2.5-5 INJECTION, SOLUTION INTRAMUSCULAR; INTRAVENOUS EVERY 6 HOURS PRN
Status: DISCONTINUED | OUTPATIENT
Start: 2020-06-08 | End: 2020-06-08

## 2020-06-08 RX ORDER — LEVOTHYROXINE SODIUM 20 UG/ML
44 INJECTION, SOLUTION INTRAVENOUS DAILY
Status: DISCONTINUED | OUTPATIENT
Start: 2020-06-08 | End: 2020-06-14

## 2020-06-08 RX ORDER — ALBUTEROL SULFATE 0.83 MG/ML
2.5 SOLUTION RESPIRATORY (INHALATION) EVERY 6 HOURS PRN
Status: DISCONTINUED | OUTPATIENT
Start: 2020-06-08 | End: 2020-06-16 | Stop reason: HOSPADM

## 2020-06-08 RX ORDER — FENTANYL CITRATE 50 UG/ML
INJECTION, SOLUTION INTRAMUSCULAR; INTRAVENOUS PRN
Status: DISCONTINUED | OUTPATIENT
Start: 2020-06-08 | End: 2020-06-08 | Stop reason: HOSPADM

## 2020-06-08 RX ORDER — METHADONE HYDROCHLORIDE 10 MG/ML
10 INJECTION, SOLUTION INTRAMUSCULAR; INTRAVENOUS; SUBCUTANEOUS EVERY 12 HOURS
Status: DISCONTINUED | OUTPATIENT
Start: 2020-06-08 | End: 2020-06-14

## 2020-06-08 RX ORDER — HYDROMORPHONE HYDROCHLORIDE 1 MG/ML
.3-.5 INJECTION, SOLUTION INTRAMUSCULAR; INTRAVENOUS; SUBCUTANEOUS
Status: DISCONTINUED | OUTPATIENT
Start: 2020-06-08 | End: 2020-06-16 | Stop reason: HOSPADM

## 2020-06-08 RX ORDER — SODIUM CHLORIDE 9 MG/ML
INJECTION, SOLUTION INTRAVENOUS CONTINUOUS
Status: DISCONTINUED | OUTPATIENT
Start: 2020-06-08 | End: 2020-06-09

## 2020-06-08 RX ORDER — METOCLOPRAMIDE HYDROCHLORIDE 5 MG/ML
10 INJECTION INTRAMUSCULAR; INTRAVENOUS 4 TIMES DAILY
Status: DISCONTINUED | OUTPATIENT
Start: 2020-06-08 | End: 2020-06-10

## 2020-06-08 RX ORDER — LORAZEPAM 2 MG/ML
0.5 INJECTION INTRAMUSCULAR EVERY 4 HOURS PRN
Status: DISCONTINUED | OUTPATIENT
Start: 2020-06-08 | End: 2020-06-16 | Stop reason: HOSPADM

## 2020-06-08 RX ORDER — IOPAMIDOL 755 MG/ML
100 INJECTION, SOLUTION INTRAVASCULAR ONCE
Status: COMPLETED | OUTPATIENT
Start: 2020-06-08 | End: 2020-06-08

## 2020-06-08 RX ADMIN — PROCHLORPERAZINE EDISYLATE 10 MG: 5 INJECTION INTRAMUSCULAR; INTRAVENOUS at 06:04

## 2020-06-08 RX ADMIN — HYDROMORPHONE HYDROCHLORIDE 0.5 MG: 1 INJECTION, SOLUTION INTRAMUSCULAR; INTRAVENOUS; SUBCUTANEOUS at 03:18

## 2020-06-08 RX ADMIN — SODIUM CHLORIDE: 9 INJECTION, SOLUTION INTRAVENOUS at 21:20

## 2020-06-08 RX ADMIN — Medication 10 MG: at 11:07

## 2020-06-08 RX ADMIN — LEVOTHYROXINE SODIUM 44 MCG: 20 INJECTION, SOLUTION INTRAVENOUS at 11:07

## 2020-06-08 RX ADMIN — Medication 10 MG: at 22:12

## 2020-06-08 RX ADMIN — HYDROMORPHONE HYDROCHLORIDE 0.5 MG: 1 INJECTION, SOLUTION INTRAMUSCULAR; INTRAVENOUS; SUBCUTANEOUS at 21:10

## 2020-06-08 RX ADMIN — METOPROLOL SUCCINATE 12.5 MG: 25 TABLET, EXTENDED RELEASE ORAL at 11:10

## 2020-06-08 RX ADMIN — IOPAMIDOL 100 ML: 755 INJECTION, SOLUTION INTRAVENOUS at 08:36

## 2020-06-08 RX ADMIN — METOCLOPRAMIDE 10 MG: 5 INJECTION, SOLUTION INTRAMUSCULAR; INTRAVENOUS at 17:28

## 2020-06-08 RX ADMIN — HYDROMORPHONE HYDROCHLORIDE 0.5 MG: 1 INJECTION, SOLUTION INTRAMUSCULAR; INTRAVENOUS; SUBCUTANEOUS at 01:23

## 2020-06-08 RX ADMIN — SODIUM CHLORIDE: 9 INJECTION, SOLUTION INTRAVENOUS at 11:22

## 2020-06-08 RX ADMIN — METOCLOPRAMIDE 10 MG: 5 INJECTION, SOLUTION INTRAMUSCULAR; INTRAVENOUS at 21:10

## 2020-06-08 RX ADMIN — PANTOPRAZOLE SODIUM 40 MG: 40 INJECTION, POWDER, FOR SOLUTION INTRAVENOUS at 11:07

## 2020-06-08 RX ADMIN — ONDANSETRON 4 MG: 2 INJECTION INTRAMUSCULAR; INTRAVENOUS at 11:27

## 2020-06-08 RX ADMIN — LORAZEPAM 0.5 MG: 2 INJECTION INTRAMUSCULAR; INTRAVENOUS at 05:06

## 2020-06-08 RX ADMIN — MICONAZOLE NITRATE: 20 POWDER TOPICAL at 11:11

## 2020-06-08 RX ADMIN — SODIUM CHLORIDE 68 ML: 9 INJECTION, SOLUTION INTRAVENOUS at 08:36

## 2020-06-08 RX ADMIN — LISINOPRIL 5 MG: 5 TABLET ORAL at 11:10

## 2020-06-08 RX ADMIN — MICONAZOLE NITRATE: 20 POWDER TOPICAL at 21:11

## 2020-06-08 RX ADMIN — HYDROMORPHONE HYDROCHLORIDE 0.5 MG: 1 INJECTION, SOLUTION INTRAMUSCULAR; INTRAVENOUS; SUBCUTANEOUS at 17:28

## 2020-06-08 RX ADMIN — LORAZEPAM 0.5 MG: 2 INJECTION INTRAMUSCULAR; INTRAVENOUS at 00:05

## 2020-06-08 RX ADMIN — HYDROMORPHONE HYDROCHLORIDE 0.5 MG: 1 INJECTION, SOLUTION INTRAMUSCULAR; INTRAVENOUS; SUBCUTANEOUS at 06:00

## 2020-06-08 RX ADMIN — ONDANSETRON 4 MG: 2 INJECTION INTRAMUSCULAR; INTRAVENOUS at 04:37

## 2020-06-08 ASSESSMENT — ACTIVITIES OF DAILY LIVING (ADL)
ADLS_ACUITY_SCORE: 25

## 2020-06-08 ASSESSMENT — MIFFLIN-ST. JEOR: SCORE: 1491.63

## 2020-06-08 NOTE — CONSULTS
[facility]  Gastroenterology Consultation         Bhavani White  [address]  [facility] [id number]  64 year old female    Admission Date/Time: 6/3/2020  Primary Care Provider: Skylar Moore  Referring / Attending Physician:  Dr. Bhavani White    We were asked to see the patient in consultation by Dr. Bhavani White for evaluation of history of previous duodenal perforation now with proximal obstruction      CC: abdominal pain, nausea and vomiting    HPI:  Bhavani White is a 64 year old female who has a past medical history including extensive surgical hx, morbid obesity with BMI of nearly 60, hypertension, dyslipidemia, DM II, asthma, PUD, chronic pain for which she is on methadone, ANIYA, neurogenic bladder dt hx of spinal stenosis and hypothyroidism. She has a hx of emergent repair of perforated duodenal ulcer 12/29/2019 with subsequent prolonged hospitalizations for intra-abdominal infections requiring exploratory laparotomy, TRUONG, drain placement with perforation, and wound vac, readmission 3/13/20-  3/16/20 for sepsis and intra-abdominal infections and abdominal wall cellulitis which were treated with antibiotics.     She presented to ED on 6/3/2020 with complaints of 3 days of nausea and vomiting and abdominal pain. Symptoms remain present. Patient quite drowsy, answering limited questions. States has had minimal bowel movements, remains chronically nauseated and abdominal pain ongoing.    Labs note WBC 13.2, Hgb 12.5. BMP normal. Currently NPO    ROS: A comprehensive ten point review of systems was negative aside from those in mentioned in the HPI.      PAST MED HX:  I have reviewed this patient's medical history and updated it with pertinent information if needed.   Past Medical History:   Diagnosis Date     Anxiety      Asthma      Depression      DM (diabetes mellitus) (H) 2003     Frequent UTI      History of blood transfusion      History of ulcer disease  2014    She notes from NSAIDs; nearly . Discussed a hospitalization at [place] for this.     Hypertension      Hypothyroid      Iatrogenic Cushing's disease (H)     off prednisone now     Infection due to alpha-hemolytic Streptococcus 2020     Morbid obesity (H)      MRSA (methicillin resistant staph aureus) culture positive 2012    repeat cx 10/12/2012 no staph mentioned.     Osteoarthritis     multiple joings.     Other chronic pain      Sleep apnea     No longer uses cpap.       MEDICATIONS:   Prior to Admission Medications   Prescriptions Last Dose Informant Patient Reported? Taking?   DULoxetine (CYMBALTA) 60 MG capsule 3 days ago Self Yes Yes   Sig: Take 120 mg by mouth every morning   VENTOLIN  (90 Base) MCG/ACT inhaler prn Self No No   Sig: INHALE 2 PUFFS INTO THE LUNGS EVERY 6 HOURS AS NEEDED FOR SHORTNESS OF BREATH   Patient taking differently: Inhale 2 puffs into the lungs every 6 hours as needed for shortness of breath / dyspnea    amLODIPine (NORVASC) 5 MG tablet 3 days ago Self No Yes   Si tablet (5 mg) by Oral or Feeding Tube route daily   bisacodyl (DULCOLAX) 10 MG suppository prn Self Yes Yes   Sig: Place 10 mg rectally 2 times daily as needed for constipation   docusate sodium (COLACE) 100 MG capsule 3 days ago Self No Yes   Sig: Take 2 capsules (200 mg) by mouth 2 times daily   gabapentin (NEURONTIN) 800 MG tablet 3 days ago Self Yes Yes   Sig: Take 1,600 mg by mouth 3 times daily 0800, 1200 and 1800   hydrOXYzine (ATARAX) 50 MG tablet prn Self Yes No   Si mg 2 times daily as needed    hypromellose-dextran (ARTIFICAL TEARS) 0.1-0.3 % ophthalmic solution prn Self No Yes   Sig: Place 1 drop into both eyes 3 times daily as needed for dry eyes   ipratropium - albuterol 0.5 mg/2.5 mg/3 mL (DUONEB) 0.5-2.5 (3) MG/3ML neb solution prn Self Yes No   Sig: Take 1 vial by nebulization every 6 hours as needed for shortness of breath / dyspnea or wheezing   levothyroxine  (SYNTHROID/LEVOTHROID) 88 MCG tablet 3 days ago Self No Yes   Sig: TAKE 1 TABLET(88 MCG) BY MOUTH DAILY   Patient taking differently: Take 88 mcg by mouth every morning (before breakfast)    lisinopril (ZESTRIL) 5 MG tablet 3 dyas ago Self Yes Yes   Sig: Take 5 mg by mouth daily    metFORMIN (GLUCOPHAGE-XR) 500 MG 24 hr tablet 3 days ago Self Yes No   Sig: Take 500 mg by mouth 2 times daily (with meals)    methadone (DOLOPHINE) 10 MG tablet 6/3/2020 at am Self No Yes   Sig: Take 2 tablets (20 mg) by mouth 2 times daily   metoprolol succinate ER (TOPROL-XL) 25 MG 24 hr tablet 3 days ago Self No Yes   Sig: Take 0.5 tablets (12.5 mg) by mouth daily   miconazole (MICATIN/MICRO GUARD) 2 % external powder prn Self No No   Sig: Apply topically 2 times daily as needed for other (topical candidiasis)   ondansetron (ZOFRAN-ODT) 4 MG ODT tab prn Self No No   Sig: Take 1 tablet (4 mg) by mouth every 6 hours as needed for nausea or vomiting   order for DME  Self No No   Sig: Equipment being ordered: Hospital Bed, total electric (head, foot, and height adjustments), with any type side rails, with mattress   order for DME  Self No No   Sig: Equipment being ordered: Motorized Wheelchair   order for DME  Self No No   Sig: Equipment being ordered: Trapeze bar for hospital bed   order for DME  Self No No   Sig: Equipment being ordered: Nebulizer   order for DME  Self No No   Sig: Equipment being ordered: Nebulizer   oxybutynin ER (DITROPAN XL) 15 MG 24 hr tablet 3 days ago Self Yes No   Sig: Take 15 mg by mouth daily    pantoprazole (PROTONIX) 40 MG EC tablet 3 days ago Self No Yes   Sig: Take 1 tablet (40 mg) by mouth daily   polyethylene glycol (MIRALAX/GLYCOLAX) packet prn Self No No   Sig: Take 17 g by mouth 2 times daily as needed (constipation)   rosuvastatin (CRESTOR) 5 MG tablet 3 days ago Self No Yes   Sig: Take 1 tablet (5 mg) by mouth At Bedtime   senna-docusate (SENOKOT-S;PERICOLACE) 8.6-50 MG per tablet 3 days ago Self No  "Yes   Sig: Take 2 tablets by mouth 2 times daily   tiZANidine (ZANAFLEX) 4 MG tablet prn Self Yes No   Sig: Take 4 mg by mouth 3 times daily as needed for muscle spasms    traZODone (DESYREL) 100 MG tablet 3 days ago Self Yes Yes   Sig: Take 100 mg by mouth At Bedtime      Facility-Administered Medications: None       ALLERGIES:   Allergies   Allergen Reactions     Povidone Iodine      \"mild skin irritation\" per patient report     Toradol [Ketorolac] Other (See Comments)     Pt gets nightmares     Tramadol Other (See Comments)       SOCIAL HISTORY:  Social History     Tobacco Use     Smoking status: Never Smoker     Smokeless tobacco: Never Used   Substance Use Topics     Alcohol use: No     Frequency: Never     Drinks per session: Patient refused     Binge frequency: Patient refused     Drug use: Yes     Types: Marijuana     Comment: Medical canibis       FAMILY HISTORY:  Family History   Problem Relation Age of Onset     Depression Son      Hypertension Mother      Heart Disease Mother         bypass     Depression Mother      Hypertension Father      Connective Tissue Disorder Father         ankylosing spondylitis     Cancer Father         colon; prostate     Other Cancer Father         bladder cancer     Depression Sister        PHYSICAL EXAM:   General  Drowsy, comfortable and orietned  Vital Signs with Ranges  Temp: 97.2  F (36.2  C) Temp src: Oral BP: (!) 160/95 Pulse: 65 Heart Rate: 78 Resp: 16 SpO2: 95 % O2 Device: None (Room air)    I/O last 3 completed shifts:  In: 315 [P.O.:315]  Out: 1150 [Urine:950; Emesis/NG output:200]    Constitutional: healthy, alert and no distress   Cardiovascular: negative, PMI normal. No lifts, heaves, or thrills. RRR. No murmurs, clicks gallops or rub  Respiratory: negative, Percussion normal. Good diaphragmatic excursion. Lungs clear  Head: Normocephalic. No masses, lesions, tenderness or abnormalities  Neck: Neck supple. No adenopathy. Thyroid symmetric, normal size,, " Carotids without bruits.  Abdomen: Abdomen soft, non-tender. BS normal. No masses, organomegaly, positive findings: tenderness marked epigastric, RUQ and RLQ          ADDITIONAL COMMENTS:   I reviewed the patient's new clinical lab test results.   Recent Labs   Lab Test 06/08/20  0738 06/06/20  0750 06/05/20  0756  01/27/20  0826  12/29/19  1535 12/29/19  1436   WBC 13.2* 10.0 8.6   < >  --    < >  --  20.5*   HGB 12.5 11.5* 11.6*   < >  --    < >  --  19.2*   MCV 74* 72* 74*   < >  --    < >  --  87    237 264   < >  --    < >  --  285   INR  --   --   --   --  1.18*  --  1.61* Canceled, Test credited    < > = values in this interval not displayed.     Recent Labs   Lab Test 06/07/20  0755 06/06/20  1656 06/06/20  0750 06/05/20  0756   POTASSIUM 3.4 3.8 3.3* 3.5   CHLORIDE 104  --  101 103   CO2 30  --  33* 34*   BUN 6*  --  13 20   ANIONGAP 5  --  4 5     Recent Labs   Lab Test 06/04/20  0725 06/03/20  1442 05/01/20  1355 03/14/20  0916 03/13/20  2213 03/13/20  2122  01/25/20  2336 01/25/20  2241  12/29/19  1649   ALBUMIN 3.0* 4.0  --  2.0*  --  2.3*   < >  --  1.8*   < >  --    BILITOTAL 0.5 0.4  --  0.4  --  0.5   < >  --  0.3   < >  --    ALT 21 24 19 24  --  24   < >  --  22   < >  --    AST 16 20  --  34  --  33   < >  --  22   < >  --    PROTEIN  --   --   --   --  30*  --   --  10*  --   --  300*   LIPASE  --  34*  --   --   --  17*  --   --  164  --   --     < > = values in this interval not displayed.       I reviewed the patient's new imaging results.        CONSULTATION ASSESSMENT AND PLAN:    Bhavani White is a pleasant 64 year old female with a complex surgical history admitted 6/3 with small bowel obstruction. Gastrografin challenge revealed contrast in colon.  CT this AM revealed likely SBO involving proximal jejunum and narrowing of the distal stomach, stomach looks distended and filled with fluid and air. GI consulted for possible EGD with dilation.    Active Problems:    SBO (small bowel  obstruction) (H)  Patient presented with 3-day hx of nausea, vomiting, and abdominal pain. Minimal flatus,  Denies hematochezia or melena.  Seen by general surgery service -- felt no emergent surgical intervention was needed and advised to continue conservative cares with decompression and NPO status. Has ongoing abdominal pain, chronic nausea. NO emesis this a.m. Given findings on abdominal CT of probable SBO involving proximal jejunum and narrowing of distal stomach will recommend patient undergo EGD with dilation.    - Keep patient NPO  - Continue with pain control  - Plan EGD with dilation today  - Agree with surgery with possible NG if emesis continues          [facility].  Office: [phone]  Cell : [phone] (Dr. Forte)  Cell: [phone] (Sangeeta Nugent PA-C)

## 2020-06-08 NOTE — PLAN OF CARE
VSS. A&Ox4. Back pain managed with dilaudid. Having more abdominal pain throughout night, abdomen slightly firm. Heat packs applied. Passing small amounts of flatus. Nauseated with emesis x1, given antiemetic meds with some relief. No BM. BS active. Only doing clears overnight. LS diminished. Up with lift. Suprapubic cath. Abdominal xray in AM.

## 2020-06-08 NOTE — CONSULTS
Care Transitions consult acknowledged. Patient very sleepy when met with patient. Patient currently undergoing testing. Patient does state she has a power wheelchair at home and she is independently able to transfer in and out of wheelchair at home. Patient currently wants to return home with  Home RN for catheter cares. I explained case management will follow along to finalize discharge plans once more medically appropriate.      Patient given BPCI letter.

## 2020-06-08 NOTE — PROGRESS NOTES
[facility]    Hospitalist Progress Note    Assessment & Plan   Bhavani White is a 64 year old female with PMHx including extensive surgical hx, morbid obesity with BMI of nearly 60, hypertension, dyslipidemia, DM II, asthma, PUD, chronic pain for which she is on methadone, ANIYA, neurogenic bladder dt hx of spinal stenosis and hypothyroidism.     She has a hx of emergent repair of perforated duodenal ulcer 12/29/2019 with subsequent prolonged hospitalizations for intra-abdominal infections requiring exploratory laparotomy, TRUONG, drain placement with perforation, and wound vac, readmission 3/13/20-  3/16/20 for sepsis and intra-abdominal infections and abdominal wall cellulitis which were treated with antibiotics.      She presented to [facility] ED on 6/3/20 with a 3 day hx of nausea/vomiting and abdominal pain. Was found to have a proximal small bowel obstruction. Was transferred to [facility] on 6/3/2020 in light of her complex surgical history.      Proximal small bowel obstruction  Presented with 3-day hx of nausea, vomiting, and abdominal pain. Endorsed severe, diffuse abdominal pain, worst over epigastrium and upper quadrants. Last bowel movement was 3 days PTA. No flatus, hematochezia or melena. Denied fevers/chills.  CT abd/pelvis in ED showed a small bowel obstruction involving the proximal jejunum with transition point in the midline upper abdomen. NGT placed in ED with 1500 ml greenish/yellow output. Made NPO on admission. Seen by general surgery service -- felt no emergent surgical intervention was needed and advised to continue conservative cares with decompression and NPO status. No clear indication for antibiotics.     Condition slowly improved. Tolerated clamping trials and NGT was removed on 6/6 per surgery. Had been passing flatus but minimal BMs. Gastrografin challenge on 6/7showed contrast in the colon. Advanced to low fiber diet on 6/7. Had recurrence of abdominal pain and emesis 6/7 PM. Repeat  CT abd/pelvis done 6/8 showed a partial SBO involving the proximal jejunum with suspected narrowing in the distal stomach related to prior perforation, stomach appeared distended and filled with fluid/air; also noted to have moderate ascites.     -- general surgery following -- in light of findings on CT on 6/8, was changed back to NPO status; may need NGT for decompression  -- GI (Reanna) consulted as it was thought she may require stricture dilation -- to have EGD this afternoon  -- oral meds changed back to IV as able     Mild hypoxia: Resolved  Hx of hypoxia during 3/2020 admission  Chronic Diastolic CHF, without acute exacerbation this stay  Hospitalization in 3/2020 was complicated by hypoxia -- CT at that time showed pleural effusion attibuted to CHF exacerbation. Had reduced EF in 12/2019 during hospitalization with acute illness. Repeat echo in 1/2020 showed EF had normalized. Echo in 3/2020 showed EF 60-65% with grade II diastolic dysfunction. On daily Lasix at home. Noted to require 2L NC this hospital stay but hasn't appeared volume overloaded.  -- cont pulmonary toilet  -- resume Lasix at discharge or sooner if needed  -- has been weaned off O2      Morbid Obesity   ANIYA  Intermittently adherent to CPAP.     Chronic pain   Neurogenic bladder with chronic suprapubic catheter  Has severe spinal stenosis and takes Methadone as an outpatient.   Was reportedly wheelchair bound prior to 12/2019.   Has a suprapubic catheter in place for chronic retention.  [PTA meds: Cymbalta 120mg daily, gabapentin 1600mg TID, methadone 20mg BID, trazodone 100mg HS, hydroxyzine 50mg BID prn, tizanidine 4mg TID prn]  Most meds held on admission given inability to take po dt SBO. Seen by pain mgmt team on 6/4 in regards to managing pain while NPO -- methadone changed from po to IV while NPO, IV Ativan was added.  -- had been placed back on oral meds on 6/7 when tolerating po (including oral methadone) -- changed back to IV as  able on 6/8 dt recurrent obstruction  -- cont methadone 10mg IV q12h for now  -- no PT/OT needs identified this stay    DM II  A1C 7.1 in 1/2020. PTA meds: metformin XR 500mg BID  Managing with sliding scale insulin while NPO this stay. Will plan to resume metformin at discharge.  Had episode of hypoglycemia on 6/6 AM, prompting addition of D5W to IVFs.     -- change accuchecks back to q4h as now NPO, sliding scale insulin orders also adjusted  -- watch BG -- add D5 to IVFs if BG trends low    Recent Labs   Lab 06/08/20  0857 06/08/20  0220 06/07/20  2153 06/07/20  1748 06/07/20  1107 06/07/20  0755 06/07/20  0732  06/06/20  0750  06/05/20  0756  06/04/20  0725  06/03/20  1442   GLC  --   --   --   --   --  107*  --   --  118*  --  79  --  95  --  151*   * 108* 117* 119* 116*  --  106*   < >  --    < >  --    < >  --    < >  --     < > = values in this interval not displayed.       Hypertension  Dyslipidemia  PTA meds: amlodipine 5mg daily, lisinopril 5mg daily, Toprol-XL 12.5mg daily, rosuvastatin 5mg HS  Meds held given admission with SBO  -- lisinopril and metoprolol XL resumed on 6/7 -- okay per surgery to continue oral antihypertensives  -- prn hydralazine also available    Chronic Microcytic Anemia  Hgb stable at 11 this stay, MCV in 70s.    Hypothyroid  PTA meds:  levothyroxine 88mcg daily  Levothyroxine initially held while NPO -- resumed on 6/7 when tolerating po.   -- changed to IV on 6/8 in light of recurrent obstruction     FEN: resumed IVFs with NS @100ml/h as she was made NPO again on 6/8, lytes stable per last check, NPO as above  DVT Prophylaxis: PCDs  Code Status: Full Code    Disposition: Now with recurrent obstruction. Changed back to NPO status this morning. Further evaluation with EGD later today. Discharge date unclear, pending resolution of obstruction and ability to tolerate po intake -- suspect 3-5d still.     Jaja Granger    Interval History   Overnight/morning events  noted. Has developed recurrent bowel obstruction. Made NPO. Meds changed to IV as able. To have EGD today to evaluate abnl seen on CT. Had received dose of IV methadone prior to my arrival. When I saw her just now, she was sleeping quietly. Opened eyes to name but promptly fell back to sleep wihtout answering questions. Did not appear to be in pain. No other complaints of cp/sob/cough.     -Data reviewed today: I reviewed all new labs and imaging results over the last 24 hours. I personally reviewed no images or EKG's today.    Physical Exam   Temp: 97.2  F (36.2  C) Temp src: Oral BP: (!) 160/95 Pulse: 65 Heart Rate: 78 Resp: 16 SpO2: 95 % O2 Device: None (Room air)    Vitals:    06/05/20 0539 06/06/20 0626 06/08/20 0610   Weight: 96 kg (211 lb 10.3 oz) 94.5 kg (208 lb 6.4 oz) 90.4 kg (199 lb 4.7 oz)     Vital Signs with Ranges  Temp:  [97.2  F (36.2  C)-98  F (36.7  C)] 97.2  F (36.2  C)  Pulse:  [65-70] 65  Heart Rate:  [67-78] 78  Resp:  [16] 16  BP: (126-160)/(73-95) 160/95  SpO2:  [95 %-98 %] 95 %  I/O last 3 completed shifts:  In: 315 [P.O.:315]  Out: 1150 [Urine:950; Emesis/NG output:200]    Constitutional: Sleeping quietly -- opens eyes to name but promptly falls back to sleep NAD  Respiratory: CTAB, no wheeze/rales/rhonchi, no increased work of breathing  Cardiovascular: HRRR, no MGR, no LE edema  GI: +grimaces with discomfort with palpation of abd but no guarding/rigidity  Skin/Integumen: warm/dry  Other:      Medications     sodium chloride 100 mL/hr at 06/08/20 1122       insulin aspart  1-7 Units Subcutaneous TID AC     insulin aspart  1-5 Units Subcutaneous At Bedtime     levothyroxine  44 mcg Intravenous Daily     lisinopril  5 mg Oral Daily     methadone  10 mg Intravenous Q12H     metoprolol succinate ER  12.5 mg Oral Daily     miconazole   Topical BID     pantoprazole (PROTONIX) IV  40 mg Intravenous Daily with breakfast     sodium chloride (PF)  3 mL Intracatheter Q8H       Data   Recent Labs    Lab 06/08/20  0738 06/07/20  0755 06/06/20  1656 06/06/20  0750 06/05/20  0756 06/04/20  0725 06/03/20  1442   WBC 13.2*  --   --  10.0 8.6 10.5 15.3*   HGB 12.5  --   --  11.5* 11.6* 11.4* 13.4   MCV 74*  --   --  72* 74* 74* 74*     --   --  237 264 272 342   NA  --  139  --  138 142 142 136   POTASSIUM  --  3.4 3.8 3.3* 3.5 3.5 3.7   CHLORIDE  --  104  --  101 103 102 95   CO2  --  30  --  33* 34* 36* 35*   BUN  --  6*  --  13 20 21 19   CR  --  0.70  --  0.64 0.74 0.78 0.68   ANIONGAP  --  5  --  4 5 4 6   DORY  --  9.4  --  9.1 9.8 9.8 11.0*   GLC  --  107*  --  118* 79 95 151*   ALBUMIN  --   --   --   --   --  3.0* 4.0   PROTTOTAL  --   --   --   --   --  6.6* 9.3*   BILITOTAL  --   --   --   --   --  0.5 0.4   ALKPHOS  --   --   --   --   --  79 107   ALT  --   --   --   --   --  21 24   AST  --   --   --   --   --  16 20   LIPASE  --   --   --   --   --   --  34*   TROPI  --   --   --   --   --   --  <0.015       Recent Results (from the past 24 hour(s))   XR Abdomen 1 View    Narrative    XR ABDOMEN ONE VIEW   6/7/2020 7:07 PM     HISTORY: Small bowel obstruction, assess for resolution. Received  gastrografin this afternoon.    COMPARISON: Abdominal x-rays dated 6/4/2020.      FINDINGS:  Since the prior study there has been oral contrast  administration which is seen throughout the colon and in some small  bowel loops. There is amorphous contrast in the right lower abdomen  which could be within the bowel. Air is seen in the distal  colon/rectal region. The stomach is gas-filled and distended.  Degenerative changes of the spine and S-shaped curvature of the  thoracolumbar spine is again noted. There has been interval removal of  the enteric sump.      Impression    IMPRESSION:  1. Since the prior study there has been oral contrast administration  which now appears to be largely in small and large bowel indicating  patency of the bowel.  2. Amorphous area of contrast in the right pelvis could be in  the  bowel. Extravasation of contrast is difficult to exclude.  3. Degenerative changes of the spine are again noted.    CLIVE JACOB MD   CT Abdomen Pelvis w Contrast    Narrative    CT ABDOMEN PELVIS W CONTRAST 6/8/2020 8:44 AM    CLINICAL HISTORY: Abd pain, acute, generalized; SBO, worsening  abdominal pain. XR with possible extravasation of oral contrast    TECHNIQUE: CT scan of the abdomen and pelvis was performed following  injection of IV contrast. Multiplanar reformats were obtained. Dose  reduction techniques were used.  CONTRAST: 100mL Isovue-370    COMPARISON: Radiograph dated 6/7/2020 at 1917 hours. CT dated  6/3/2020.    FINDINGS:   LOWER CHEST: Small pleural effusions with associated atelectasis.  There is high attenuation along the left ventricle.    HEPATOBILIARY: Cholelithiasis. No bile duct dilatation. The liver  contour is mildly nodular.    PANCREAS: Mildly atrophic.    SPLEEN: Minimal amount of fluid adjacent to the spleen is unchanged.    ADRENAL GLANDS: Normal.    KIDNEYS/BLADDER: Simple cysts in the kidneys do not require follow-up.  There is a nonobstructing calculus in the left kidney. No  hydronephrosis or hydroureter. No ureteral calculi. A suprapubic  catheter is present.    BOWEL: The stomach is distended with fluid and air. Soft tissue  irregularity at the distal stomach is associated with mild adjacent  stranding an architectural distortion. There are no large fluid  collections or air locules in this region. Proximal jejunal loops  measure up to 5 cm in diameter and exhibit air-fluid levels. There is  a suspected transition point in the left upper quadrant (coronal  series 5 image 47, for example). Distal small bowel loops are normal  in caliber. There is oral contrast material in the colon. No  extraluminal contrast material identified.    PELVIC ORGANS: Hysterectomy. No adnexal masses.    ADDITIONAL FINDINGS: Moderate volume of ascites.    MUSCULOSKELETAL: Postsurgical changes  along the ventral abdominal  wall. Degenerative changes in the spine. There is mild anasarca.      Impression    IMPRESSION:   1.  No extravasation of oral contrast material into the  intraperitoneal space.  2.  Suspect partial small bowel obstruction involving the proximal  jejunum. Suspect narrowing of the distal stomach related to prior  perforation in the region of prior intervention.  3.  Moderate volume of ascites. No free air.    LESTER J FAHRNER, MD

## 2020-06-08 NOTE — PLAN OF CARE
VSS, pt on room air. IV dilaudid given prn for pain control. Pt mostly c/o pain in her back. Tolerating full liquids. No BM this evening.

## 2020-06-08 NOTE — PROGRESS NOTES
"General Surgery Progress Note    Admission Date: 6/3/2020  Today's Date: 6/8/2020         Assessment:      Bhavani White is a 64 year old female with a complex surgical history admitted 6/3 with small bowel obstruction. Passing gas but otherwise minimal bowel function. Gastrografin challenge revealed contrast in colon. Worsening abdominal pain overnight with episodes of emesis. CT this AM revealed likely SBO involving proximal jejunum and narrowing of the distal stomach, stomach looks distended and filled with fluid and air.         Plan:   - Consult GI for consideration of EGD, possible stricture dilation  - NPO. Restart maintenance IV fluids  - If persistent emesis, will need NG tube placement. Discussed with patient. Anti-emetics prn  - Meds changed to IV other than anti-hypertensives  - Adjusted pain meds, work on decreasing IV dilaudid. Scheduled methadone bid, valium IV prn muscle spasms/back pain  - Medical management per hospitalist, appreciate input  - Discussed with Dr. Akbar        Interval History:   Afebrile overnight, vitals stable on room air other than some mild hypertension. XR reviewed by myself last night, patient was denying any change in abdominal pain at that time. Worsening abdominal pain overnight and emesis as above. No BM, continues to pass some gas.          Physical Exam:   BP (!) 160/95 (BP Location: Right arm)   Pulse 65   Temp 97.2  F (36.2  C) (Oral)   Resp 16   Ht 1.702 m (5' 7\")   Wt 90.4 kg (199 lb 4.7 oz)   SpO2 95%   BMI 31.21 kg/m    I/O last 3 completed shifts:  In: 315 [P.O.:315]  Out: 1150 [Urine:950; Emesis/NG output:200]  General: NAD, awake but drowsy, answers questions appropriately  Respiratory: non-labored breathing   Abdomen: slightly distended but soft. Some diffuse tenderness across lower abdomen. No rebound or guarding. Numerous surgical scars.    LABS:  Recent Labs   Lab Test 06/08/20  0738 06/06/20  0750 06/05/20  0756   WBC 13.2* 10.0 8.6   HGB 12.5 11.5* " 11.6*   MCV 74* 72* 74*    237 264      Recent Labs   Lab Test 06/07/20  0755 06/06/20  1656 06/06/20  0750 06/05/20  0756   POTASSIUM 3.4 3.8 3.3* 3.5   CHLORIDE 104  --  101 103   CO2 30  --  33* 34*   BUN 6*  --  13 20   CR 0.70  --  0.64 0.74   ANIONGAP 5  --  4 5     -------------------------------    Lola Jimenez PA-C  [facility]  [phone]

## 2020-06-08 NOTE — PLAN OF CARE
A/o x3. Dry heaves noted, anti-emetics helping. NPO and  IVF restarted. Suprapubic catheter with adequate uop. Pain fairly controlled. Plan for EGD this afternoon.

## 2020-06-08 NOTE — PROGRESS NOTES
[facility] General Surgery:    The patient was seen and examined today. I agree with the note written today by Lola Jimenez PA-C, including the findings and plan as written.  Pertinent aspects from my exam or visit today in regards to medical decision making: CT scan completed this a.m. with evidence of contrast present within the colon, no evidence of contrast extravasation or free air.  Partial small bowel obstruction at the level of the proximal jejunum and possible narrowing of the distal stomach, related to prior perforation and surgical intervention.  Patient's abdomen is soft, slightly distended, mildly tender with palpation.  Recommend n.p.o. and possible replacement of NG tube, if develops worsening abdominal pain/distention or nausea/vomiting.  Will consult gastroenterology for upper endoscopic evaluation and possible stricture dilation.    Lidia Akbar MD

## 2020-06-08 NOTE — PROGRESS NOTES
EGD and SBE findings consistent with Ileus. No Stricture upto proximal to mid jejunum.  Start on I/V Reglan.  NPO.  D/W hospitalist and Surgery team.    Gabe Fotre [facility]

## 2020-06-08 NOTE — PROVIDER NOTIFICATION
MD Notification    Notified Person: MD    Notified Person Name: Dr. Granger    Notification Date/Time: 6/8/2020 17:50    Notification Interaction: page    Purpose of Notification: pt is requesting ativan for neuropathy pain    Orders Received: ativan 0.5 mg IV q4h PRN    Comments:

## 2020-06-09 LAB
ANION GAP SERPL CALCULATED.3IONS-SCNC: 6 MMOL/L (ref 3–14)
BASOPHILS # BLD AUTO: 0 10E9/L (ref 0–0.2)
BASOPHILS NFR BLD AUTO: 0.3 %
BUN SERPL-MCNC: 10 MG/DL (ref 7–30)
CALCIUM SERPL-MCNC: 9.4 MG/DL (ref 8.5–10.1)
CHLORIDE SERPL-SCNC: 104 MMOL/L (ref 94–109)
CO2 SERPL-SCNC: 29 MMOL/L (ref 20–32)
CREAT SERPL-MCNC: 0.72 MG/DL (ref 0.52–1.04)
DIFFERENTIAL METHOD BLD: ABNORMAL
EOSINOPHIL # BLD AUTO: 0.3 10E9/L (ref 0–0.7)
EOSINOPHIL NFR BLD AUTO: 2.5 %
ERYTHROCYTE [DISTWIDTH] IN BLOOD BY AUTOMATED COUNT: 20.5 % (ref 10–15)
GFR SERPL CREATININE-BSD FRML MDRD: 88 ML/MIN/{1.73_M2}
GLUCOSE BLDC GLUCOMTR-MCNC: 101 MG/DL (ref 70–99)
GLUCOSE BLDC GLUCOMTR-MCNC: 121 MG/DL (ref 70–99)
GLUCOSE BLDC GLUCOMTR-MCNC: 131 MG/DL (ref 70–99)
GLUCOSE BLDC GLUCOMTR-MCNC: 75 MG/DL (ref 70–99)
GLUCOSE BLDC GLUCOMTR-MCNC: 79 MG/DL (ref 70–99)
GLUCOSE BLDC GLUCOMTR-MCNC: 90 MG/DL (ref 70–99)
GLUCOSE SERPL-MCNC: 98 MG/DL (ref 70–99)
HCT VFR BLD AUTO: 41.4 % (ref 35–47)
HGB BLD-MCNC: 12.8 G/DL (ref 11.7–15.7)
IMM GRANULOCYTES # BLD: 0 10E9/L (ref 0–0.4)
IMM GRANULOCYTES NFR BLD: 0.2 %
LYMPHOCYTES # BLD AUTO: 1.8 10E9/L (ref 0.8–5.3)
LYMPHOCYTES NFR BLD AUTO: 15.6 %
MCH RBC QN AUTO: 22.9 PG (ref 26.5–33)
MCHC RBC AUTO-ENTMCNC: 30.9 G/DL (ref 31.5–36.5)
MCV RBC AUTO: 74 FL (ref 78–100)
MONOCYTES # BLD AUTO: 0.8 10E9/L (ref 0–1.3)
MONOCYTES NFR BLD AUTO: 6.5 %
NEUTROPHILS # BLD AUTO: 8.9 10E9/L (ref 1.6–8.3)
NEUTROPHILS NFR BLD AUTO: 74.9 %
NRBC # BLD AUTO: 0 10*3/UL
NRBC BLD AUTO-RTO: 0 /100
PLATELET # BLD AUTO: 212 10E9/L (ref 150–450)
POTASSIUM SERPL-SCNC: 3.9 MMOL/L (ref 3.4–5.3)
RBC # BLD AUTO: 5.59 10E12/L (ref 3.8–5.2)
SODIUM SERPL-SCNC: 139 MMOL/L (ref 133–144)
WBC # BLD AUTO: 11.8 10E9/L (ref 4–11)

## 2020-06-09 PROCEDURE — 00000146 ZZHCL STATISTIC GLUCOSE BY METER IP

## 2020-06-09 PROCEDURE — 80048 BASIC METABOLIC PNL TOTAL CA: CPT | Performed by: INTERNAL MEDICINE

## 2020-06-09 PROCEDURE — 25000132 ZZH RX MED GY IP 250 OP 250 PS 637: Mod: GY | Performed by: PHYSICIAN ASSISTANT

## 2020-06-09 PROCEDURE — 99231 SBSQ HOSP IP/OBS SF/LOW 25: CPT | Performed by: SURGERY

## 2020-06-09 PROCEDURE — 25000132 ZZH RX MED GY IP 250 OP 250 PS 637: Mod: GY | Performed by: INTERNAL MEDICINE

## 2020-06-09 PROCEDURE — 25000128 H RX IP 250 OP 636: Performed by: PHYSICIAN ASSISTANT

## 2020-06-09 PROCEDURE — 25800030 ZZH RX IP 258 OP 636: Performed by: PHYSICIAN ASSISTANT

## 2020-06-09 PROCEDURE — C9113 INJ PANTOPRAZOLE SODIUM, VIA: HCPCS | Performed by: PHYSICIAN ASSISTANT

## 2020-06-09 PROCEDURE — 25000128 H RX IP 250 OP 636: Performed by: INTERNAL MEDICINE

## 2020-06-09 PROCEDURE — 36415 COLL VENOUS BLD VENIPUNCTURE: CPT | Performed by: INTERNAL MEDICINE

## 2020-06-09 PROCEDURE — 99232 SBSQ HOSP IP/OBS MODERATE 35: CPT | Performed by: INTERNAL MEDICINE

## 2020-06-09 PROCEDURE — 85025 COMPLETE CBC W/AUTO DIFF WBC: CPT | Performed by: INTERNAL MEDICINE

## 2020-06-09 PROCEDURE — 12000000 ZZH R&B MED SURG/OB

## 2020-06-09 PROCEDURE — 25000125 ZZHC RX 250: Performed by: PHYSICIAN ASSISTANT

## 2020-06-09 RX ORDER — AMLODIPINE BESYLATE 5 MG/1
5 TABLET ORAL DAILY
Status: DISCONTINUED | OUTPATIENT
Start: 2020-06-09 | End: 2020-06-10

## 2020-06-09 RX ORDER — FLUMAZENIL 0.1 MG/ML
0.2 INJECTION, SOLUTION INTRAVENOUS
Status: ACTIVE | OUTPATIENT
Start: 2020-06-09 | End: 2020-06-09

## 2020-06-09 RX ORDER — DEXTROSE MONOHYDRATE, SODIUM CHLORIDE, AND POTASSIUM CHLORIDE 50; 1.49; 4.5 G/1000ML; G/1000ML; G/1000ML
INJECTION, SOLUTION INTRAVENOUS CONTINUOUS
Status: DISCONTINUED | OUTPATIENT
Start: 2020-06-09 | End: 2020-06-11 | Stop reason: CLARIF

## 2020-06-09 RX ORDER — NALOXONE HYDROCHLORIDE 0.4 MG/ML
.1-.4 INJECTION, SOLUTION INTRAMUSCULAR; INTRAVENOUS; SUBCUTANEOUS
Status: ACTIVE | OUTPATIENT
Start: 2020-06-09 | End: 2020-06-10

## 2020-06-09 RX ADMIN — HYDROMORPHONE HYDROCHLORIDE 0.5 MG: 1 INJECTION, SOLUTION INTRAMUSCULAR; INTRAVENOUS; SUBCUTANEOUS at 12:47

## 2020-06-09 RX ADMIN — METOCLOPRAMIDE 10 MG: 5 INJECTION, SOLUTION INTRAMUSCULAR; INTRAVENOUS at 12:40

## 2020-06-09 RX ADMIN — METOCLOPRAMIDE 10 MG: 5 INJECTION, SOLUTION INTRAMUSCULAR; INTRAVENOUS at 09:11

## 2020-06-09 RX ADMIN — HYDROMORPHONE HYDROCHLORIDE 0.5 MG: 1 INJECTION, SOLUTION INTRAMUSCULAR; INTRAVENOUS; SUBCUTANEOUS at 04:52

## 2020-06-09 RX ADMIN — HYDROMORPHONE HYDROCHLORIDE 0.5 MG: 1 INJECTION, SOLUTION INTRAMUSCULAR; INTRAVENOUS; SUBCUTANEOUS at 19:33

## 2020-06-09 RX ADMIN — HYDROMORPHONE HYDROCHLORIDE 0.5 MG: 1 INJECTION, SOLUTION INTRAMUSCULAR; INTRAVENOUS; SUBCUTANEOUS at 16:29

## 2020-06-09 RX ADMIN — ONDANSETRON 4 MG: 2 INJECTION INTRAMUSCULAR; INTRAVENOUS at 18:55

## 2020-06-09 RX ADMIN — AMLODIPINE BESYLATE 5 MG: 5 TABLET ORAL at 16:30

## 2020-06-09 RX ADMIN — Medication 10 MG: at 09:54

## 2020-06-09 RX ADMIN — LORAZEPAM 0.5 MG: 2 INJECTION INTRAMUSCULAR; INTRAVENOUS at 14:57

## 2020-06-09 RX ADMIN — PANTOPRAZOLE SODIUM 40 MG: 40 INJECTION, POWDER, FOR SOLUTION INTRAVENOUS at 09:11

## 2020-06-09 RX ADMIN — POTASSIUM CHLORIDE, DEXTROSE MONOHYDRATE AND SODIUM CHLORIDE: 150; 5; 450 INJECTION, SOLUTION INTRAVENOUS at 10:45

## 2020-06-09 RX ADMIN — HYDROMORPHONE HYDROCHLORIDE 0.5 MG: 1 INJECTION, SOLUTION INTRAMUSCULAR; INTRAVENOUS; SUBCUTANEOUS at 07:56

## 2020-06-09 RX ADMIN — Medication 10 MG: at 22:02

## 2020-06-09 RX ADMIN — SODIUM CHLORIDE: 9 INJECTION, SOLUTION INTRAVENOUS at 04:58

## 2020-06-09 RX ADMIN — METOCLOPRAMIDE 10 MG: 5 INJECTION, SOLUTION INTRAMUSCULAR; INTRAVENOUS at 18:38

## 2020-06-09 RX ADMIN — LEVOTHYROXINE SODIUM 44 MCG: 20 INJECTION, SOLUTION INTRAVENOUS at 09:54

## 2020-06-09 RX ADMIN — METOCLOPRAMIDE 10 MG: 5 INJECTION, SOLUTION INTRAMUSCULAR; INTRAVENOUS at 21:57

## 2020-06-09 RX ADMIN — METOPROLOL SUCCINATE 12.5 MG: 25 TABLET, EXTENDED RELEASE ORAL at 09:11

## 2020-06-09 RX ADMIN — MICONAZOLE NITRATE: 20 POWDER TOPICAL at 09:16

## 2020-06-09 RX ADMIN — LISINOPRIL 5 MG: 5 TABLET ORAL at 09:11

## 2020-06-09 RX ADMIN — POTASSIUM CHLORIDE, DEXTROSE MONOHYDRATE AND SODIUM CHLORIDE: 150; 5; 450 INJECTION, SOLUTION INTRAVENOUS at 20:32

## 2020-06-09 RX ADMIN — HYDROMORPHONE HYDROCHLORIDE 0.5 MG: 1 INJECTION, SOLUTION INTRAMUSCULAR; INTRAVENOUS; SUBCUTANEOUS at 01:13

## 2020-06-09 ASSESSMENT — ACTIVITIES OF DAILY LIVING (ADL)
ADLS_ACUITY_SCORE: 25

## 2020-06-09 ASSESSMENT — MIFFLIN-ST. JEOR: SCORE: 1493.63

## 2020-06-09 NOTE — PROGRESS NOTES
[facility]  Gastroenterology Progress Note     Bhavani White MRN# [id number]   YOB: 1955 Age: 64 year old          Assessment and Plan:     SBO (small bowel obstruction) (H)  Ileus  Abdominal pain  Bhavani White is a pleasant 64 year old female with a complex surgical history admitted 6/3 with small bowel obstruction. Gastrografin challenge revealed contrast in colon.  CT of abdomen revealed likely SBO involving proximal jejunum and narrowing of the distal stomach, stomach looks distended and filled with fluid and air.  Patient underwent EGD on 6/8/2020 and noted findings consistent with ileus. Started on IV Reglan. Surgery has no plans for surgical intervention.     - Keep patient NPO except necessary oral meds  - Continue with pain control- but workup decreasing narcotic use  - Diet per surgery  - Agree with surgery with possible NG if emesis continues  - Appreciated surgery and hospitatlist input         SBO (small bowel obstruction) (H)      Interval History:   Pain improved but present in abdomen, reports bowel movement and flatus              Review of Systems:   C: NEGATIVE for fever, chills, change in weight  E/M: NEGATIVE for ear, mouth and throat problems  R: NEGATIVE for significant cough or SOB  CV: NEGATIVE for chest pain, palpitations or peripheral edema             Medications:   I have reviewed this patient's current medications    insulin aspart  1-6 Units Subcutaneous Q4H     levothyroxine  44 mcg Intravenous Daily     lisinopril  5 mg Oral Daily     methadone  10 mg Intravenous Q12H     metoclopramide  10 mg Intravenous 4x Daily     metoprolol succinate ER  12.5 mg Oral Daily     miconazole   Topical BID     pantoprazole (PROTONIX) IV  40 mg Intravenous Daily with breakfast     sodium chloride (PF)  3 mL Intracatheter Q8H                  Physical Exam:   Vitals were reviewed  Vital Signs with Ranges  Temp:  [97.3  F (36.3  C)-98.1  F (36.7  C)] 98.1  F (36.7   C)  Pulse:  [72-98] 76  Heart Rate:  [] 60  Resp:  [10-22] 16  BP: (118-168)/() 156/79  SpO2:  [88 %-98 %] 96 %  I/O last 3 completed shifts:  In: 1753 [I.V.:1753]  Out: 750 [Urine:750]  Constitutional: healthy, alert and no distress   Cardiovascular: negative, PMI normal. No lifts, heaves, or thrills. RRR. No murmurs, clicks gallops or rub  Respiratory: negative, Percussion normal. Good diaphragmatic excursion. Lungs clear  Head: Normocephalic. No masses, lesions, tenderness or abnormalities  Neck: Neck supple. No adenopathy. Thyroid symmetric, normal size,, Carotids without bruits.  Abdomen: Abdomen soft, tender. BS normal in all 4 quadrants. No masses, organomegaly, positive findings: tenderness moderate epigastric, RUQ and periumbilical           Data:   I reviewed the patient's new clinical lab test results.   Recent Labs   Lab Test 06/09/20  0908 06/08/20  0738 06/06/20  0750  01/27/20  0826  12/29/19  1535 12/29/19  1436   WBC 11.8* 13.2* 10.0   < >  --    < >  --  20.5*   HGB 12.8 12.5 11.5*   < >  --    < >  --  19.2*   MCV 74* 74* 72*   < >  --    < >  --  87    243 237   < >  --    < >  --  285   INR  --   --   --   --  1.18*  --  1.61* Canceled, Test credited    < > = values in this interval not displayed.     Recent Labs   Lab Test 06/07/20  0755 06/06/20  1656 06/06/20  0750 06/05/20  0756   POTASSIUM 3.4 3.8 3.3* 3.5   CHLORIDE 104  --  101 103   CO2 30  --  33* 34*   BUN 6*  --  13 20   ANIONGAP 5  --  4 5     Recent Labs   Lab Test 06/04/20  0725 06/03/20  1442 05/01/20  1355 03/14/20  0916 03/13/20  2213 03/13/20 2122  01/25/20  2336 01/25/20  2241  12/29/19  1649   ALBUMIN 3.0* 4.0  --  2.0*  --  2.3*   < >  --  1.8*   < >  --    BILITOTAL 0.5 0.4  --  0.4  --  0.5   < >  --  0.3   < >  --    ALT 21 24 19 24  --  24   < >  --  22   < >  --    AST 16 20  --  34  --  33   < >  --  22   < >  --    PROTEIN  --   --   --   --  30*  --   --  10*  --   --  300*   LIPASE  --  34*  --    --   --  17*  --   --  164  --   --     < > = values in this interval not displayed.       I reviewed the patient's new imaging results.    All laboratory data reviewed  All imaging studies reviewed by me.    Sangeeta Nugent PA-C,  6/9/2020  [facility]  Office : [phone]  Cell: [phone] (Dr. Forte)  Cell: [phone] (Sangeeta Nugent PA-C)

## 2020-06-09 NOTE — PROGRESS NOTES
[facility] General Surgery:    The patient was seen and examined today. I agree with the note written today by Lola Jimenez PA-C, including the findings and plan as written.  Pertinent aspects from my exam or visit today in regards to medical decision making: Patient remained stable.  Upper endoscopy yesterday without evidence of stricture.  Symptoms presumed secondary to ileus.  Reglan started.  Patient did have bowel movement this morning.  She reports her abdominal pain is slightly improved.  Still some intermittent nausea, well controlled at the time of my exam.  Continue n.p.o. at this time.    Lidia Akbar MD

## 2020-06-09 NOTE — PLAN OF CARE
VSS.  Pt c/o back and abdominal pain, dilaudid and methadone administered per MD orders.  Pt also c/o neuropathy pain in her feet and hands, ativan administered per MD orders, a small amount of relief noted.  Pt refused to get OOB bed this shift, offered to lift the pt OOB x 2 and she declined both times.  Pt c/o slight nausea but state it was relieved when she raised the head of her bed, pt refused to have the NG tube placed at this time.

## 2020-06-09 NOTE — PLAN OF CARE
DATE & TIME: 6/8/2020 3866-5527    Cognitive Concerns/ Orientation : A&O4   BEHAVIOR & AGGRESSION TOOL COLOR: green  ABNL VS/O2: VSS on RA  MOBILITY: total assist, wheelchair bound at baseline.   PAIN MANAGMENT: dilaudid x2 and scheduled methadone helpful  DIET: NPO except ice chips per GI  BOWEL/BLADDER: suprapubic catheter; no BM this shift  ABNL LAB/BG: BG 86/78  DRAIN/DEVICES: catheter; IV infusing fluids 100mL/hr  SKIN: bruising and joe  TESTS/PROCEDURES: EGD done today, found ileus  D/C DAY/GOALS/PLACE: pending resolution of SBO

## 2020-06-09 NOTE — PROGRESS NOTES
CLINICAL NUTRITION SERVICES - REASSESSMENT NOTE      RECOMMENDATIONS FOR MD/PROVIDER TO ORDER:   - Day 8 inadequate oral intake (3 days PTA, 5 days in hospital) --> consider nutrition support (TPN vs EN)   Recommendations Ordered by Registered Dietitian (RD):   - Cancel protein supplements while NPO    Malnutrition: (6/4)   % Weight Loss:  > 7.5% in 3 months (severe malnutrition)  % Intake:  </= 50% for >/= 5 days (severe malnutrition) - will meet tomorrow  Subcutaneous Fat Loss:  Deferred at this time due to inability to see patient   Muscle Loss:  Deferred, see above   Fluid Retention:  None noted     Malnutrition Diagnosis: Severe malnutrition  In Context of:  Acute illness or injury  Chronic illness or disease       EVALUATION OF PROGRESS TOWARD GOALS   Diet:  NPO    Intake/Tolerance:   Chart reviewed. Patient had advanced from NPO to FLD on 6/6-6/7 where she consumed ~25% meals ordered. She is now NPO since 6/8. This makes patient day 8 inadequate intake (since admission + 3 days PTA)    Wt is trending down  Vitals:    06/04/20 0620 06/05/20 0539 06/06/20 0626 06/08/20 0610   Weight: 95.3 kg (210 lb 1.6 oz) 96 kg (211 lb 10.3 oz) 94.5 kg (208 lb 6.4 oz) 90.4 kg (199 lb 4.7 oz)    06/09/20 0700   Weight: 90.6 kg (199 lb 11.8 oz)       x1 BM recorded today    ASSESSED NUTRITION NEEDS:  Dosing Weight 70 kg - adjusted   Estimated Energy Needs: 9259-3465 kcals (25-30 Kcal/Kg)  Justification: maintenance and obese  Estimated Protein Needs: + grams protein (1.2-1.5+ g pro/Kg)  Justification: maintenance   Estimated Fluid Needs: Per MD pending fluid status      NEW FINDINGS:   Per Gastroenterology -->  Patient underwent EGD on 6/8/2020 and noted findings consistent with ileus. Started on IV Reglan. Surgery has no plans for surgical intervention    Previous Goals:   Diet advancement >CLD vs start nutrition support in the next 3-5 days.   Evaluation: Not met    Previous Nutrition Diagnosis:   Inadequate oral intake  related to altered GI function as evidenced by no oral intake x3 days PTA due to nausea, vomiting, and abdominal pain.   Evaluation: No change      CURRENT NUTRITION DIAGNOSIS  Inadequate oral intake related to altered GI function, nausea, vomiting, abd pain, ileus as evidenced by minimal oral intake x8 days     INTERVENTIONS  Recommendations / Nutrition Prescription  ADAT vs nutrition support     Implementation  None at this time     Goals  Diet prabhu >FL vs nutrition support within 24-36 hrs       MONITORING AND EVALUATION:  Progress towards goals will be monitored and evaluated per protocol and Practice Guidelines      Rakel Zacarias RD, LD  Clinical Dietitian

## 2020-06-09 NOTE — PROGRESS NOTES
[facility]    Hospitalist Progress Note    Assessment & Plan   Bhavani White is a 64 year old female with PMHx including extensive surgical hx, morbid obesity with BMI of nearly 60, hypertension, dyslipidemia, DM II, asthma, PUD, chronic pain for which she is on methadone, ANIYA, neurogenic bladder dt hx of spinal stenosis and hypothyroidism.     She has a hx of emergent repair of perforated duodenal ulcer 12/29/2019 with subsequent prolonged hospitalizations for intra-abdominal infections requiring exploratory laparotomy, TRUONG, drain placement with perforation, and wound vac, readmission 3/13/20-  3/16/20 for sepsis and intra-abdominal infections and abdominal wall cellulitis which were treated with antibiotics.      She presented to [facility] ED on 6/3/20 with a 3 day hx of nausea/vomiting and abdominal pain. Was found to have a proximal small bowel obstruction. Was transferred to [facility] on 6/3/2020 in light of her complex surgical history.      Proximal small bowel obstruction  Presented with 3-day hx of nausea, vomiting, and abdominal pain. Endorsed severe, diffuse abdominal pain, worst over epigastrium and upper quadrants. Last bowel movement was 3 days PTA. No flatus, hematochezia or melena. Denied fevers/chills.  CT abd/pelvis in ED showed a small bowel obstruction involving the proximal jejunum with transition point in the midline upper abdomen. NGT placed in ED with 1500 ml greenish/yellow output. Made NPO on admission. Seen by general surgery service -- felt no emergent surgical intervention was needed and advised to continue conservative cares with decompression and NPO status. No clear indication for antibiotics.     Condition slowly improved. Tolerated clamping trials and NGT was removed on 6/6 per surgery. Had been passing flatus but minimal BMs. Gastrografin challenge on 6/7showed contrast in the colon. Advanced to low fiber diet on 6/7. Had recurrence of abdominal pain and emesis 6/7 PM. Repeat  CT abd/pelvis done 6/8 showed a partial SBO involving the proximal jejunum with suspected narrowing in the distal stomach related to prior perforation, stomach appeared distended and filled with fluid/air; also noted to have moderate ascites. Underwent EGD/small bowel enteroscopy per Dr. Forte on 6/8 -- had lots of fluid in stomach but did not find any evidence of stricture or obstruction. Unclaer if findings may have been dt ileus. Was started on Reglan on 6/8 PM.    -- general surgery following -- remains NPO today (BP meds okay, other oral meds changed back to IV or on hold)  -- cont IV Reglan  -- cont additional supportive cares with IVFs, antiemetics, IV pain meds (working on decreasing IV narcotic use)  -- if sx worsen, may need NG replaced     Mild hypoxia: Resolved  Chronic Diastolic CHF, without acute exacerbation this stay  Hospitalization in 3/2020 was complicated by hypoxia -- CT at that time showed pleural effusion attibuted to CHF exacerbation. Had reduced EF in 12/2019 during hospitalization with acute illness. Repeat echo in 1/2020 showed EF had normalized. Echo in 3/2020 showed EF 60-65% with grade II diastolic dysfunction. On daily Lasix at home. Noted to require 2L NC this hospital stay but hasn't appeared volume overloaded.  -- cont pulmonary toilet  -- resume Lasix at discharge or sooner if needed  -- has been weaned off O2      Morbid Obesity   ANIYA  Intermittently adherent to CPAP.     Chronic pain   Neurogenic bladder with chronic suprapubic catheter  Has severe spinal stenosis and takes Methadone as an outpatient.   Was reportedly wheelchair bound prior to 12/2019.   Has a suprapubic catheter in place for chronic retention.  [PTA meds: Cymbalta 120mg daily, gabapentin 1600mg TID, methadone 20mg BID, trazodone 100mg HS, hydroxyzine 50mg BID prn, tizanidine 4mg TID prn]  Most meds held on admission given inability to take po dt SBO. Seen by pain mgmt team on 6/4 in regards to managing pain  while NPO -- methadone changed from po to IV while NPO, IV Ativan was added.    Had been placed back on oral meds on 6/7 when tolerating po (including oral methadone) -- changed back to IV as able on 6/8 dt recurrent obstruction    -- cont methadone 10mg IV q12h until able to take po  -- cont IV Ativan prn in lieu of gabapentin  -- no PT/OT needs identified this stay    DM II  A1C 7.1 in 1/2020. PTA meds: metformin XR 500mg BID  Managing with sliding scale insulin while NPO this stay. Will plan to resume metformin at discharge. BG trended low at time this stay (when NPO) so D5 was added to IVFs    -- cont accuchecks q4h with sliding scale insulin   -- D5 added to IVFs on 6/9 AM as BG was trending low    Recent Labs   Lab 06/09/20  1228 06/09/20  1203 06/09/20  0828 06/09/20  0355 06/09/20  0110 06/08/20 2007 06/08/20  1707  06/07/20  0755  06/06/20  0750  06/05/20  0756  06/04/20  0725  06/03/20  1442   GLC 98  --   --   --   --   --   --   --  107*  --  118*  --  79  --  95  --  151*   BGM  --  101* 75 79 90 78 86   < >  --    < >  --    < >  --    < >  --    < >  --     < > = values in this interval not displayed.       Hypertension  Dyslipidemia  PTA meds: amlodipine 5mg daily, lisinopril 5mg daily, Toprol-XL 12.5mg daily, rosuvastatin 5mg HS  Meds held given admission with SBO  -- lisinopril and metoprolol XL resumed on 6/7, amlodipine added back on 6/9 (okay per surgery to take oral BP meds)   -- prn hydralazine also available    Chronic Microcytic Anemia  Hgb stable at 11 this stay, MCV in 70s.    Hypothyroid  PTA meds:  levothyroxine 88mcg daily  Levothyroxine initially held while NPO -- resumed on 6/7 when tolerating po.   -- changed to IV on 6/8 in light of recurrent obstruction     FEN: cont D5 1/2NS w/KCl @100ml/h, lytes stable, NPO as above  DVT Prophylaxis: PCDs  Code Status: Full Code    Disposition: Discharge date unclear, pending resolution of obstruction/ileus and ability to tolerate po intake --  suspect 3-5d still.     Jaja Granger    Interval History   Had some nausea earlier today, has since resolved. No emesis. Reportedly passing some flatus and had BM earlier. Seen this afternoon. Resting comfortably. Didn't endorse any complaints during my visit -- denied cp/sob/cough, abd pain/n/v.     -Data reviewed today: I reviewed all new labs and imaging results over the last 24 hours. I personally reviewed no images or EKG's today.    Physical Exam   Temp: 98  F (36.7  C) Temp src: Oral BP: (!) 155/75   Heart Rate: 63 Resp: 16 SpO2: 94 % O2 Device: None (Room air) Oxygen Delivery: 2.5 LPM  Vitals:    06/06/20 0626 06/08/20 0610 06/09/20 0700   Weight: 94.5 kg (208 lb 6.4 oz) 90.4 kg (199 lb 4.7 oz) 90.6 kg (199 lb 11.8 oz)     Vital Signs with Ranges  Temp:  [97.4  F (36.3  C)-98.1  F (36.7  C)] 98  F (36.7  C)  Heart Rate:  [60-70] 63  Resp:  [16-20] 16  BP: (118-156)/(68-79) 155/75  SpO2:  [94 %-98 %] 94 %  I/O last 3 completed shifts:  In: 2553 [I.V.:2553]  Out: 950 [Urine:950]    Constitutional: Resting comfortably, A&Ox3, NAD  Respiratory: CTAB, no wheeze/rales/rhonchi, no increased work of breathing  Cardiovascular: HRRR, no MGR, no LE edema  GI: +TTP, no guarding/rigidity, +scattered BS  Skin/Integumen: warm/dry  Other:      Medications     dextrose 5% and 0.45% NaCl + KCl 20 mEq/L 100 mL/hr at 06/09/20 1045     - MEDICATION INSTRUCTIONS -         insulin aspart  1-6 Units Subcutaneous Q4H     levothyroxine  44 mcg Intravenous Daily     lisinopril  5 mg Oral Daily     methadone  10 mg Intravenous Q12H     metoclopramide  10 mg Intravenous 4x Daily     metoprolol succinate ER  12.5 mg Oral Daily     miconazole   Topical BID     pantoprazole (PROTONIX) IV  40 mg Intravenous Daily with breakfast     sodium chloride (PF)  3 mL Intracatheter Q8H       Data   Recent Labs   Lab 06/09/20  1228 06/09/20  0908 06/08/20  0738 06/07/20  0755 06/06/20  1656 06/06/20  0750  06/04/20  0725 06/03/20  1442    WBC  --  11.8* 13.2*  --   --  10.0   < > 10.5 15.3*   HGB  --  12.8 12.5  --   --  11.5*   < > 11.4* 13.4   MCV  --  74* 74*  --   --  72*   < > 74* 74*   PLT  --  212 243  --   --  237   < > 272 342     --   --  139  --  138   < > 142 136   POTASSIUM 3.9  --   --  3.4 3.8 3.3*   < > 3.5 3.7   CHLORIDE 104  --   --  104  --  101   < > 102 95   CO2 29  --   --  30  --  33*   < > 36* 35*   BUN 10  --   --  6*  --  13   < > 21 19   CR 0.72  --   --  0.70  --  0.64   < > 0.78 0.68   ANIONGAP 6  --   --  5  --  4   < > 4 6   DORY 9.4  --   --  9.4  --  9.1   < > 9.8 11.0*   GLC 98  --   --  107*  --  118*   < > 95 151*   ALBUMIN  --   --   --   --   --   --   --  3.0* 4.0   PROTTOTAL  --   --   --   --   --   --   --  6.6* 9.3*   BILITOTAL  --   --   --   --   --   --   --  0.5 0.4   ALKPHOS  --   --   --   --   --   --   --  79 107   ALT  --   --   --   --   --   --   --  21 24   AST  --   --   --   --   --   --   --  16 20   LIPASE  --   --   --   --   --   --   --   --  34*   TROPI  --   --   --   --   --   --   --   --  <0.015    < > = values in this interval not displayed.       No results found for this or any previous visit (from the past 24 hour(s)).

## 2020-06-09 NOTE — PROGRESS NOTES
"General Surgery Progress Note    Admission Date: 6/3/2020  Today's Date: 6/9/2020         Assessment:      Bhavani White is a 64 year old female with a complex surgical history admitted 6/3 with small bowel obstruction.   Gastrografin challenge 6/7 revealed contrast in colon, CT 6/8 with likely partial SBO and narrowing of distal stomach. Underwent endoscopy with Dr. Forte 6/8 which showed ileus, no stricture. Started on IV Reglan.         Plan:   - Continue NPO, OK for PO blood pressure meds otherwise use IV meds.  - Continue IV Reglan per Dr. Forte  - Work on decreasing narcotic use  - Monitor for more bowel function. Passing gas, + BM this morning  - If persistent nausea or any further emesis, place NG tube. Discussed with patient  - White count improved today, afebrile. Continue to monitor.  - Continue maintenance IV fluids, changed to dextrose-containing  - Medical management per hospitalist, appreciate help        Interval History:   Afebrile overnight, vitals stable except occasional mild tachycardia. Patient reports having 1 BM this morning, unsure of amount/consistency. Still report continued abdominal pain, bloating, intermittent nausea. Has not vomited today. Chronic back pain continues.          Physical Exam:   /75 (BP Location: Right arm)   Pulse 76   Temp 97.4  F (36.3  C) (Oral)   Resp 16   Ht 1.702 m (5' 7\")   Wt 90.6 kg (199 lb 11.8 oz)   SpO2 95%   BMI 31.28 kg/m    I/O last 3 completed shifts:  In: 1753 [I.V.:1753]  Out: 750 [Urine:750]  General: NAD, pleasant, alert and oriented x3  Respiratory: non-labored breathing   Abdomen: somewhat distended. Mostly soft but some firmness along right side, potentially palpating underlying adhesions. Diffusely tender to palpation, worse along right side.    LABS:  Recent Labs   Lab Test 06/09/20  0908 06/08/20  0738 06/06/20  0750   WBC 11.8* 13.2* 10.0   HGB 12.8 12.5 11.5*   MCV 74* 74* 72*    243 237      Recent Labs   Lab Test " 06/07/20  0755 06/06/20  1656 06/06/20  0750 06/05/20  0756   POTASSIUM 3.4 3.8 3.3* 3.5   CHLORIDE 104  --  101 103   CO2 30  --  33* 34*   BUN 6*  --  13 20   CR 0.70  --  0.64 0.74   ANIONGAP 5  --  4 5      -------------------------------    Lola Jimenez PA-C  [facility]  [phone]

## 2020-06-09 NOTE — PLAN OF CARE
Neuro: Aox4. Repeated requests.     Respiratory: RA. Diminished LS.     Cardiac: WNL.     GI/: Bowel sounds audible x4 quads. BM 06/07. Adequate urine OP via suprapubic cath.     Skin: Pale, dry.     Pain: Attributed to chronic back, abd; refer to FS, MAR.     Mobility: W/C bound.     Diet: NPO; ice.    IVF: NS.    Plan of Care: Pain control; resumption of bowel activity.      Will continue to monitor.

## 2020-06-09 NOTE — PROGRESS NOTES
SPIRITUAL HEALTH SERVICES: Tele-Encounter  Referral Source:Length of Stay    DATA: Briefly chatted with pt to acquaint her with [facility]. She said she didn't need any help at this time.  Informed pt she could always request a  visit via her nurse.   PLAN: will check back in with pt in a few days  Marcel Gregglain Intern      ______________________________    Type of service:  Telephone Visit     has received verbal consent for a TelephoneVisit from the patient? Yes    Distance Provider Location: designated [place] office or home office (secure setting)    Mode of Communication: telephone (via [employer] phone or [employer] tele-call-number ([phone]))

## 2020-06-10 ENCOUNTER — APPOINTMENT (OUTPATIENT)
Dept: GENERAL RADIOLOGY | Facility: CLINIC | Age: 65
DRG: 389 | End: 2020-06-10
Attending: INTERNAL MEDICINE
Payer: MEDICARE

## 2020-06-10 ENCOUNTER — APPOINTMENT (OUTPATIENT)
Dept: PHYSICAL THERAPY | Facility: CLINIC | Age: 65
DRG: 389 | End: 2020-06-10
Attending: INTERNAL MEDICINE
Payer: MEDICARE

## 2020-06-10 LAB
ANION GAP SERPL CALCULATED.3IONS-SCNC: 5 MMOL/L (ref 3–14)
BUN SERPL-MCNC: 7 MG/DL (ref 7–30)
CALCIUM SERPL-MCNC: 9.6 MG/DL (ref 8.5–10.1)
CHLORIDE SERPL-SCNC: 102 MMOL/L (ref 94–109)
CO2 SERPL-SCNC: 29 MMOL/L (ref 20–32)
CREAT SERPL-MCNC: 0.71 MG/DL (ref 0.52–1.04)
ERYTHROCYTE [DISTWIDTH] IN BLOOD BY AUTOMATED COUNT: 19.9 % (ref 10–15)
GFR SERPL CREATININE-BSD FRML MDRD: >90 ML/MIN/{1.73_M2}
GLUCOSE BLDC GLUCOMTR-MCNC: 124 MG/DL (ref 70–99)
GLUCOSE BLDC GLUCOMTR-MCNC: 124 MG/DL (ref 70–99)
GLUCOSE BLDC GLUCOMTR-MCNC: 131 MG/DL (ref 70–99)
GLUCOSE BLDC GLUCOMTR-MCNC: 137 MG/DL (ref 70–99)
GLUCOSE BLDC GLUCOMTR-MCNC: 149 MG/DL (ref 70–99)
GLUCOSE BLDC GLUCOMTR-MCNC: 92 MG/DL (ref 70–99)
GLUCOSE SERPL-MCNC: 136 MG/DL (ref 70–99)
HCT VFR BLD AUTO: 38.1 % (ref 35–47)
HGB BLD-MCNC: 11.9 G/DL (ref 11.7–15.7)
MAGNESIUM SERPL-MCNC: 1.7 MG/DL (ref 1.6–2.3)
MCH RBC QN AUTO: 23.1 PG (ref 26.5–33)
MCHC RBC AUTO-ENTMCNC: 31.2 G/DL (ref 31.5–36.5)
MCV RBC AUTO: 74 FL (ref 78–100)
PHOSPHATE SERPL-MCNC: 2.8 MG/DL (ref 2.5–4.5)
PLATELET # BLD AUTO: 271 10E9/L (ref 150–450)
POTASSIUM SERPL-SCNC: 3.7 MMOL/L (ref 3.4–5.3)
RBC # BLD AUTO: 5.16 10E12/L (ref 3.8–5.2)
SODIUM SERPL-SCNC: 136 MMOL/L (ref 133–144)
TRIGL SERPL-MCNC: 87 MG/DL
WBC # BLD AUTO: 10.8 10E9/L (ref 4–11)

## 2020-06-10 PROCEDURE — 84100 ASSAY OF PHOSPHORUS: CPT | Performed by: INTERNAL MEDICINE

## 2020-06-10 PROCEDURE — 00000146 ZZHCL STATISTIC GLUCOSE BY METER IP

## 2020-06-10 PROCEDURE — 25000128 H RX IP 250 OP 636: Performed by: INTERNAL MEDICINE

## 2020-06-10 PROCEDURE — 27211439 ZZ H KIT SHRLOCK 5FR POWER PICC DOUBLE LUMEN

## 2020-06-10 PROCEDURE — C9113 INJ PANTOPRAZOLE SODIUM, VIA: HCPCS | Performed by: PHYSICIAN ASSISTANT

## 2020-06-10 PROCEDURE — 83735 ASSAY OF MAGNESIUM: CPT | Performed by: INTERNAL MEDICINE

## 2020-06-10 PROCEDURE — 12000000 ZZH R&B MED SURG/OB

## 2020-06-10 PROCEDURE — 40000257 ZZH STATISTIC CONSULT NO CHARGE VASC ACCESS

## 2020-06-10 PROCEDURE — 25800030 ZZH RX IP 258 OP 636: Performed by: PHYSICIAN ASSISTANT

## 2020-06-10 PROCEDURE — 25000128 H RX IP 250 OP 636: Performed by: PHYSICIAN ASSISTANT

## 2020-06-10 PROCEDURE — 36569 INSJ PICC 5 YR+ W/O IMAGING: CPT

## 2020-06-10 PROCEDURE — 25000125 ZZHC RX 250: Performed by: PHYSICIAN ASSISTANT

## 2020-06-10 PROCEDURE — 74019 RADEX ABDOMEN 2 VIEWS: CPT

## 2020-06-10 PROCEDURE — 99231 SBSQ HOSP IP/OBS SF/LOW 25: CPT | Performed by: SURGERY

## 2020-06-10 PROCEDURE — 97110 THERAPEUTIC EXERCISES: CPT | Mod: GP

## 2020-06-10 PROCEDURE — 84478 ASSAY OF TRIGLYCERIDES: CPT | Performed by: INTERNAL MEDICINE

## 2020-06-10 PROCEDURE — 36415 COLL VENOUS BLD VENIPUNCTURE: CPT | Performed by: INTERNAL MEDICINE

## 2020-06-10 PROCEDURE — 80048 BASIC METABOLIC PNL TOTAL CA: CPT | Performed by: INTERNAL MEDICINE

## 2020-06-10 PROCEDURE — 99232 SBSQ HOSP IP/OBS MODERATE 35: CPT | Performed by: INTERNAL MEDICINE

## 2020-06-10 PROCEDURE — 0DHA3UZ INSERTION OF FEEDING DEVICE INTO JEJUNUM, PERCUTANEOUS APPROACH: ICD-10-PCS | Performed by: RADIOLOGY

## 2020-06-10 PROCEDURE — 85027 COMPLETE CBC AUTOMATED: CPT | Performed by: INTERNAL MEDICINE

## 2020-06-10 RX ORDER — NICOTINE POLACRILEX 4 MG
15-30 LOZENGE BUCCAL
Status: DISCONTINUED | OUTPATIENT
Start: 2020-06-10 | End: 2020-06-10

## 2020-06-10 RX ORDER — HEPARIN SODIUM,PORCINE 10 UNIT/ML
2-5 VIAL (ML) INTRAVENOUS
Status: DISCONTINUED | OUTPATIENT
Start: 2020-06-10 | End: 2020-06-11 | Stop reason: CLARIF

## 2020-06-10 RX ORDER — DEXTROSE MONOHYDRATE 25 G/50ML
25-50 INJECTION, SOLUTION INTRAVENOUS
Status: DISCONTINUED | OUTPATIENT
Start: 2020-06-10 | End: 2020-06-10

## 2020-06-10 RX ORDER — LIDOCAINE 40 MG/G
CREAM TOPICAL
Status: DISCONTINUED | OUTPATIENT
Start: 2020-06-10 | End: 2020-06-11 | Stop reason: HOSPADM

## 2020-06-10 RX ORDER — LIDOCAINE 40 MG/G
CREAM TOPICAL
Status: DISCONTINUED | OUTPATIENT
Start: 2020-06-10 | End: 2020-06-10

## 2020-06-10 RX ORDER — HYDRALAZINE HYDROCHLORIDE 20 MG/ML
10 INJECTION INTRAMUSCULAR; INTRAVENOUS EVERY 4 HOURS PRN
Status: DISCONTINUED | OUTPATIENT
Start: 2020-06-10 | End: 2020-06-16 | Stop reason: HOSPADM

## 2020-06-10 RX ORDER — DEXTROSE MONOHYDRATE 100 MG/ML
INJECTION, SOLUTION INTRAVENOUS CONTINUOUS PRN
Status: DISCONTINUED | OUTPATIENT
Start: 2020-06-10 | End: 2020-06-16

## 2020-06-10 RX ORDER — METOPROLOL TARTRATE 1 MG/ML
2.5-5 INJECTION, SOLUTION INTRAVENOUS EVERY 4 HOURS PRN
Status: DISCONTINUED | OUTPATIENT
Start: 2020-06-10 | End: 2020-06-16 | Stop reason: HOSPADM

## 2020-06-10 RX ADMIN — MICONAZOLE NITRATE: 20 POWDER TOPICAL at 21:33

## 2020-06-10 RX ADMIN — LORAZEPAM 0.5 MG: 2 INJECTION INTRAMUSCULAR; INTRAVENOUS at 21:22

## 2020-06-10 RX ADMIN — I.V. FAT EMULSION 250 ML: 20 EMULSION INTRAVENOUS at 19:56

## 2020-06-10 RX ADMIN — LORAZEPAM 0.5 MG: 2 INJECTION INTRAMUSCULAR; INTRAVENOUS at 14:32

## 2020-06-10 RX ADMIN — MICONAZOLE NITRATE: 20 POWDER TOPICAL at 09:03

## 2020-06-10 RX ADMIN — ONDANSETRON 4 MG: 2 INJECTION INTRAMUSCULAR; INTRAVENOUS at 21:31

## 2020-06-10 RX ADMIN — LIDOCAINE HYDROCHLORIDE 1 ML: 10 INJECTION, SOLUTION EPIDURAL; INFILTRATION; INTRACAUDAL; PERINEURAL at 12:55

## 2020-06-10 RX ADMIN — HYDROMORPHONE HYDROCHLORIDE 0.5 MG: 1 INJECTION, SOLUTION INTRAMUSCULAR; INTRAVENOUS; SUBCUTANEOUS at 19:31

## 2020-06-10 RX ADMIN — HYDROMORPHONE HYDROCHLORIDE 0.5 MG: 1 INJECTION, SOLUTION INTRAMUSCULAR; INTRAVENOUS; SUBCUTANEOUS at 04:12

## 2020-06-10 RX ADMIN — ASCORBIC ACID, VITAMIN A PALMITATE, CHOLECALCIFEROL, THIAMINE HYDROCHLORIDE, RIBOFLAVIN-5 PHOSPHATE SODIUM, PYRIDOXINE HYDROCHLORIDE, NIACINAMIDE, DEXPANTHENOL, ALPHA-TOCOPHEROL ACETATE, VITAMIN K1, FOLIC ACID, BIOTIN, CYANOCOBALAMIN: 200; 3300; 200; 6; 3.6; 6; 40; 15; 10; 150; 600; 60; 5 INJECTION, SOLUTION INTRAVENOUS at 19:56

## 2020-06-10 RX ADMIN — POTASSIUM CHLORIDE, DEXTROSE MONOHYDRATE AND SODIUM CHLORIDE: 150; 5; 450 INJECTION, SOLUTION INTRAVENOUS at 06:34

## 2020-06-10 RX ADMIN — LEVOTHYROXINE SODIUM 44 MCG: 20 INJECTION, SOLUTION INTRAVENOUS at 08:51

## 2020-06-10 RX ADMIN — LORAZEPAM 0.5 MG: 2 INJECTION INTRAMUSCULAR; INTRAVENOUS at 09:21

## 2020-06-10 RX ADMIN — METOCLOPRAMIDE 10 MG: 5 INJECTION, SOLUTION INTRAMUSCULAR; INTRAVENOUS at 08:51

## 2020-06-10 RX ADMIN — ONDANSETRON 4 MG: 2 INJECTION INTRAMUSCULAR; INTRAVENOUS at 01:11

## 2020-06-10 RX ADMIN — Medication 10 MG: at 22:29

## 2020-06-10 RX ADMIN — Medication 10 MG: at 10:32

## 2020-06-10 RX ADMIN — HYDROMORPHONE HYDROCHLORIDE 0.5 MG: 1 INJECTION, SOLUTION INTRAMUSCULAR; INTRAVENOUS; SUBCUTANEOUS at 15:51

## 2020-06-10 RX ADMIN — HYDROMORPHONE HYDROCHLORIDE 0.5 MG: 1 INJECTION, SOLUTION INTRAMUSCULAR; INTRAVENOUS; SUBCUTANEOUS at 08:29

## 2020-06-10 RX ADMIN — HYDROMORPHONE HYDROCHLORIDE 0.5 MG: 1 INJECTION, SOLUTION INTRAMUSCULAR; INTRAVENOUS; SUBCUTANEOUS at 00:03

## 2020-06-10 RX ADMIN — PANTOPRAZOLE SODIUM 40 MG: 40 INJECTION, POWDER, FOR SOLUTION INTRAVENOUS at 08:29

## 2020-06-10 RX ADMIN — HYDROMORPHONE HYDROCHLORIDE 0.5 MG: 1 INJECTION, SOLUTION INTRAMUSCULAR; INTRAVENOUS; SUBCUTANEOUS at 12:36

## 2020-06-10 ASSESSMENT — ACTIVITIES OF DAILY LIVING (ADL)
ADLS_ACUITY_SCORE: 25
ADLS_ACUITY_SCORE: 27
ADLS_ACUITY_SCORE: 25
ADLS_ACUITY_SCORE: 27
ADLS_ACUITY_SCORE: 25
ADLS_ACUITY_SCORE: 25

## 2020-06-10 NOTE — PROVIDER NOTIFICATION
Brief update:    Paged re: NG placement; needed to be replaced by nursing, question if XR needed to confirm placement    Patient clinically stable following placement, ~300 ml bilious output following placement.    Based on output and patient status, no need for XR to confirm placement (placement for decompression).    Rogers Kramer MD  4:55 AM

## 2020-06-10 NOTE — PROGRESS NOTES
[facility]    Hospitalist Progress Note    Assessment & Plan   Bhavani White is a 64 year old female with PMHx including extensive surgical hx, morbid obesity with BMI of nearly 60, hypertension, dyslipidemia, DM II, asthma, PUD, chronic pain for which she is on methadone, ANIYA, neurogenic bladder dt hx of spinal stenosis and hypothyroidism.     She has a hx of emergent repair of perforated duodenal ulcer 12/29/2019 with subsequent prolonged hospitalizations for intra-abdominal infections requiring exploratory laparotomy, TRUONG, drain placement with perforation, and wound vac, readmission 3/13/20-  3/16/20 for sepsis and intra-abdominal infections and abdominal wall cellulitis which were treated with antibiotics.      She presented to [facility] ED on 6/3/20 with a 3 day hx of nausea/vomiting and abdominal pain. Was found to have a proximal small bowel obstruction. Was transferred to [facility] on 6/3/2020 in light of her complex surgical history.      Proximal small bowel obstruction  Presented with 3-day hx of nausea, vomiting, and abdominal pain. Endorsed severe, diffuse abdominal pain, worst over epigastrium and upper quadrants. Last bowel movement was 3 days PTA. No flatus, hematochezia or melena. Denied fevers/chills.  CT abd/pelvis in ED showed a small bowel obstruction involving the proximal jejunum with transition point in the midline upper abdomen. NGT placed in ED with 1500 ml greenish/yellow output. Made NPO on admission. Seen by general surgery service -- felt no emergent surgical intervention was needed and advised to continue conservative cares with decompression and NPO status. No clear indication for antibiotics.     Condition slowly improved. Tolerated clamping trials and NGT was removed on 6/6 per surgery. Had been passing flatus but minimal BMs. Gastrografin challenge on 6/7showed contrast in the colon. Advanced to low fiber diet on 6/7. Had recurrence of abdominal pain and emesis 6/7 PM. Repeat  CT abd/pelvis done 6/8 showed a partial SBO involving the proximal jejunum with suspected narrowing in the distal stomach related to prior perforation, stomach appeared distended and filled with fluid/air; also noted to have moderate ascites. Underwent EGD/small bowel enteroscopy per Dr. Forte on 6/8 -- had lots of fluid in stomach but did not find any evidence of stricture or obstruction. Unclaer if findings may have been dt ileus. Was started on Reglan on 6/8 PM. Had recurrent emesis on 6/9 PM and NG tube was ultimately replaced with 1300ml output.     -- cont NG to LIS  -- remains NPO (had been doing BP meds previously -- given large output will hold these for now)  -- ?cont sched IV Reglan -- will defer to surgery as to whether this should be stopped  -- appreciate assistnace with ongoing care per general surgery and GI  -- cont additional supportive cares with IVFs, antiemetics, IV pain meds (working on decreasing IV narcotic use)     Mild hypoxia: Resolved  Chronic Diastolic CHF, without acute exacerbation this stay  Hospitalization in 3/2020 was complicated by hypoxia -- CT at that time showed pleural effusion attibuted to CHF exacerbation. Had reduced EF in 12/2019 during hospitalization with acute illness. Repeat echo in 1/2020 showed EF had normalized. Echo in 3/2020 showed EF 60-65% with grade II diastolic dysfunction. On daily Lasix at home. Noted to require 2L NC this hospital stay but hasn't appeared volume overloaded.  -- cont pulmonary toilet  -- resume Lasix at discharge or sooner if needed  -- has been weaned off O2      Morbid Obesity   ANIYA  Intermittently adherent to CPAP.     Chronic pain   Neurogenic bladder with chronic suprapubic catheter  Has severe spinal stenosis and takes Methadone as an outpatient.   Was reportedly wheelchair bound prior to 12/2019.   Has a suprapubic catheter in place for chronic retention.  [PTA meds: Cymbalta 120mg daily, gabapentin 1600mg TID, methadone 20mg BID,  trazodone 100mg HS, hydroxyzine 50mg BID prn, tizanidine 4mg TID prn]  Most meds held on admission given inability to take po dt SBO. Seen by pain mgmt team on 6/4 in regards to managing pain while NPO -- methadone changed from po to IV while NPO, IV Ativan was added.    Had been placed back on oral meds on 6/7 when tolerating po (including oral methadone) -- changed back to IV (as able) on 6/8 dt recurrent obstruction    -- cont methadone 10mg IV q12h until able to take po  -- cont IV Ativan prn in lieu of gabapentin  -- no PT/OT needs identified this stay    DM II  A1C 7.1 in 1/2020. PTA meds: metformin XR 500mg BID  Managing with sliding scale insulin while NPO this stay. Will plan to resume metformin at discharge. BG trended low at time this stay (when NPO) so D5 was added to IVFs    -- cont accuchecks q4h with sliding scale insulin   -- D5 added to IVFs on 6/9 AM as BG was trending low    Recent Labs   Lab 06/10/20  0836 06/10/20  0807 06/10/20  0531 06/10/20  0007 06/09/20  2040 06/09/20  1718 06/09/20  1228 06/09/20  1203  06/07/20  0755  06/06/20  0750  06/05/20  0756  06/04/20  0725   *  --   --   --   --   --  98  --   --  107*  --  118*  --  79  --  95   BGM  --  131* 149* 137* 131* 121*  --  101*   < >  --    < >  --    < >  --    < >  --     < > = values in this interval not displayed.       Hypertension  Dyslipidemia  PTA meds: amlodipine 5mg daily, lisinopril 5mg daily, Toprol-XL 12.5mg daily, rosuvastatin 5mg HS  Meds held given admission with SBO, transiently resumed during stay but subsequently stopped on 6/10 given need for NG tube  -- has prn hydralazine and metoprolol available (for SBP >160)  -- resume oral meds when tolerating po intake     Chronic Microcytic Anemia  Hgb stable at 11 this stay, MCV in 70s.    Hypothyroid  PTA meds:  levothyroxine 88mcg daily  Levothyroxine initially held while NPO -- resumed on 6/7 when tolerating po.   -- changed to IV on 6/8 in light of recurrent  obstruction/ileus     FEN: cont D5 1/2NS w/KCl @100ml/h, lytes stable, NPO as above  DVT Prophylaxis: PCDs  Code Status: Full Code    Disposition: Discharge date unclear, pending resolution of obstruction/ileus and ability to tolerate po intake -- suspect 3-5d still.     Jaja Granger    Interval History   Overnight events noted. Had recurrent emesis and NG tube replaced with 1300ml output. Seen this morning. Resting quietly. Ongoing output per NG. Denies significant abd discomfort at present. Says she has passed some flatus. No cp/sob/cough.    -Data reviewed today: I reviewed all new labs and imaging results over the last 24 hours. I personally reviewed no images or EKG's today.    Physical Exam   Temp: 98  F (36.7  C) Temp src: Oral BP: (!) 141/67   Heart Rate: 68 Resp: 16 SpO2: 93 % O2 Device: None (Room air)    Vitals:    06/06/20 0626 06/08/20 0610 06/09/20 0700   Weight: 94.5 kg (208 lb 6.4 oz) 90.4 kg (199 lb 4.7 oz) 90.6 kg (199 lb 11.8 oz)     Vital Signs with Ranges  Temp:  [98  F (36.7  C)-98.9  F (37.2  C)] 98  F (36.7  C)  Heart Rate:  [60-73] 68  Resp:  [16-18] 16  BP: (141-178)/(67-88) 141/67  SpO2:  [93 %-96 %] 93 %  I/O last 3 completed shifts:  In: 2757 [I.V.:2757]  Out: 4350 [Urine:2950; Emesis/NG output:1400]    Constitutional: Resting comfortably, A&Ox3, NAD  Respiratory: CTAB, no wheeze/rales/rhonchi, no increased work of breathing  Cardiovascular: HRRR, no MGR, no LE edema  GI: remains TTP, no guarding/rigidity, +scattered BS  Skin/Integumen: warm/dry  Other:      Medications     dextrose 5% and 0.45% NaCl + KCl 20 mEq/L 100 mL/hr at 06/10/20 0851     - MEDICATION INSTRUCTIONS -         insulin aspart  1-6 Units Subcutaneous Q4H     levothyroxine  44 mcg Intravenous Daily     methadone  10 mg Intravenous Q12H     metoclopramide  10 mg Intravenous 4x Daily     miconazole   Topical BID     pantoprazole (PROTONIX) IV  40 mg Intravenous Daily with breakfast     sodium chloride (PF)  3  mL Intracatheter Q8H       Data   Recent Labs   Lab 06/10/20  0836 06/09/20  1228 06/09/20  0908 06/08/20  0738 06/07/20  0755  06/06/20  0750  06/04/20  0725 06/03/20  1442   WBC  --   --  11.8* 13.2*  --   --  10.0   < > 10.5 15.3*   HGB  --   --  12.8 12.5  --   --  11.5*   < > 11.4* 13.4   MCV  --   --  74* 74*  --   --  72*   < > 74* 74*   PLT  --   --  212 243  --   --  237   < > 272 342    139  --   --  139  --  138   < > 142 136   POTASSIUM 3.7 3.9  --   --  3.4   < > 3.3*   < > 3.5 3.7   CHLORIDE 102 104  --   --  104  --  101   < > 102 95   CO2 29 29  --   --  30  --  33*   < > 36* 35*   BUN 7 10  --   --  6*  --  13   < > 21 19   CR 0.71 0.72  --   --  0.70  --  0.64   < > 0.78 0.68   ANIONGAP 5 6  --   --  5  --  4   < > 4 6   DORY 9.6 9.4  --   --  9.4  --  9.1   < > 9.8 11.0*   * 98  --   --  107*  --  118*   < > 95 151*   ALBUMIN  --   --   --   --   --   --   --   --  3.0* 4.0   PROTTOTAL  --   --   --   --   --   --   --   --  6.6* 9.3*   BILITOTAL  --   --   --   --   --   --   --   --  0.5 0.4   ALKPHOS  --   --   --   --   --   --   --   --  79 107   ALT  --   --   --   --   --   --   --   --  21 24   AST  --   --   --   --   --   --   --   --  16 20   LIPASE  --   --   --   --   --   --   --   --   --  34*   TROPI  --   --   --   --   --   --   --   --   --  <0.015    < > = values in this interval not displayed.       No results found for this or any previous visit (from the past 24 hour(s)).

## 2020-06-10 NOTE — PROGRESS NOTES
[facility] General Surgery:    The patient was seen and examined today. I agree with the note written today by Lola Jimenez PA-C, including the findings and plan as written.  Pertinent aspects from my exam or visit today in regards to medical decision making: Patient with emesis and increased abdominal pain yesterday.  NG tube replaced.  1400 mL out yesterday and 1200 mL out so far today.  Patient feeling much better with the NG tube in place.  White blood cell count decreased.  Afebrile.  Passing some flatus.  Discussed with patient proceeding with placement of IR gastrostomy tube with jejunal extension for feeds past the area of partial obstruction.  Patient in agreement with this plan.    Lidia Akbar MD

## 2020-06-10 NOTE — PLAN OF CARE
A&Ox4.  BP elevated but did not meet parameters for prn.  Amlodipine restarted today.  NPO except for BP med.  Dilaudid given x2 for abd/back pain.  Hot pack used.  1 100ml emesis, explained orders were to place NG if emesis and pt declined. Zofran given and no further emesis this shift.  BS active.  States passing gas.  Refused to get in chair, declines repositioning at times.   and 131.

## 2020-06-10 NOTE — PLAN OF CARE
VSS.  Pt refused to get OOB with the ceiling lift despite multiple attempts made by this RN.  Pt was turned and repositioned q2 hours and PRN in her bed.  PICC line placed by IV team today.  Pt had 1200cc of bile/brown drainage out of her NG tube, which is to LIS.  Hypoactive BS noted.  SP catheter is intact and 350cc of urine noted this shift.  Pt is taking IV methadone, IV ativan,and IV dilaudid for pain control per MD orders.

## 2020-06-10 NOTE — PROGRESS NOTES
"General Surgery Progress Note    Admission Date: 6/3/2020  Today's Date: 6/10/2020         Assessment:      Bhavani White is a 64 year old female with a complex surgical history admitted 6/3 with small bowel obstruction.   Gastrografin challenge 6/7 revealed contrast in colon, CT 6/8 with likely partial SBO and narrowing of distal stomach. Underwent endoscopy with Dr. Forte 6/8 which showed likely ileus, no stricture. Started on IV Reglan. 1 BM 6/9, intermittent emesis, NG replaced 6/9.         Plan:   - Continue NPO, NG to LIS. Need to await better return of bowel function. Hopefully ileus/SBO will slowly improve  - Start parenteral nutrition, dietician consulted. Will need PICC placement  - Discontinue scheduled Reglan. Patient has bowel sounds, NG tube providing decompression. Want to be cautious with prokinetic agents given potential partial SBO  - White count normalized today. Hemoglobin stable  - Continue maintenance IV fluids. Medical management per hospitalist, appreciate help  - PCDs, protonix  - Pain meds prn, continue to work on decreasing narcotic use  - Will discuss with surgeon        Interval History:   Afebrile, vitals stable on room air except mild hypertension. PO anti-hypertensives discontinued, using prn IV meds now. NG with >1300cc output yesterday after placement, already ~1L out this morning. Patient states she continues to pass gas. She believes she had another BM this morning, but per nursing staff she did not  - diaper was only changed to address skin concerns. Patient dislikes NG tube, but reports significant improvement in abdominal pain and nausea after placement.          Physical Exam:   BP (!) 141/67   Pulse 76   Temp 98  F (36.7  C) (Oral)   Resp 16   Ht 1.702 m (5' 7\")   Wt 90.6 kg (199 lb 11.8 oz)   SpO2 93%   BMI 31.28 kg/m    I/O last 3 completed shifts:  In: 2757 [I.V.:2757]  Out: 4350 [Urine:2950; Emesis/NG output:1400]  General: NAD, pleasant, alert and awake. Answers " questions appropriately  Respiratory: non-labored breathing   Abdomen: somewhat distended but mostly soft. Mild diffuse tenderness to palpation. + bowel sounds.    LABS:  Recent Labs   Lab Test 06/10/20  0836 06/09/20  0908 06/08/20  0738   WBC 10.8 11.8* 13.2*   HGB 11.9 12.8 12.5   MCV 74* 74* 74*    212 243      Recent Labs   Lab Test 06/10/20  0836 06/09/20  1228 06/07/20  0755   POTASSIUM 3.7 3.9 3.4   CHLORIDE 102 104 104   CO2 29 29 30   BUN 7 10 6*   CR 0.71 0.72 0.70   ANIONGAP 5 6 5      -------------------------------    Lola Jimenez PA-C  [facility]  [phone]

## 2020-06-10 NOTE — CONSULTS
Chart reviewed, asked to see patient for TPN initiation.  Patient was seen yesterday for a complete reassessment - see note with this date for details.    Weight today = 90.6 kg (down 4.7 kg from admit)  Patient at risk for refeeding syndrome due to prolonged inadequate intake (day #9-10) and weight loss    Currently with D5 at 100 mL/hr= 120 g CHO, 408 kcal     Estimated Needs adjusted using lower weight to re-adjust DW and modify energy and protein needs  ASSESSED NEEDS:   DW: 68.4 kg (adjusted)   Step #1:  7834-7600 kcal (15-20)   Step #2:  2199-5299 kcal (20-25)   Step #3:  4757-1804 kcal (25-30)   Protein:   g (1.2-1.5)       Order written for the following TPN progression -->    Step #1:  D15 AA5 at 50 mL/hr + Lipid 250 mL every 48 hours:  1102 kcal, 60 g protein (0.9 g/kg), 180 g CHO   Decrease D5 to 50 mL/hr:  60 g CHO, 204 kcal   Total = 1306 kcal (19 kcal/kg), 240 g CHO (GIR 2.4)     After 24 hours if K, Mg, and Phos acceptable increase to   Step #2:  D15 AA5 at 65 mL/hr + Lipid 250 mL daily:  1608 kcal, 78 g protein (1.1 g/kg), 234 g CHO   Decrease D5 to 35 mL/hr:  42 g CHO, 143 kcal   Total = 1751 kcal (26 kcal/kg), 276 g CHO (GIR 2.8)     After 24 hours if K, Mg, and Phos acceptable increase to goal   Step #3:  D15 AA5 at 75 mL/hr + Lipid 250 mL daily:  1778 kcal, 90 g protein (1.3 g/kg), 270 g CHO   Decrease D5 to 25 mL/hr:  30 g CHO, 102 kcal   Total = 1880 kcal (27 kcal/kg), 300 g CHO (GIR 3)     Julia Jang, RD, LD, [facility]   Clinical Dietitian - [facility]

## 2020-06-10 NOTE — PLAN OF CARE
Pt A/O. VSS on RA, slightly hypertensive. C/o abdominal/back pain. IV dilaudid x2. C/o nausea, Zofran x1. 1 episode of emesis. NG placed. NG to LIS, 1300ml bile out within the hour. Pt reports nausea and abdominal pain to be improved since NG placed. NPO. Suprapubic cath WNL. PIV w/ IVF at 100 ml/hr. On pulsate mattress. Declined repositioning at times.

## 2020-06-10 NOTE — PLAN OF CARE
Discharge Planner PT   Patient plan for discharge: TBD  Current status: Pt is Modified [place] with bed mobility, but still reports deconditioning but is close to her baseline. As she's reluctant to perform flexed pivots without her power chair and dislikes the lift, she's been more inactive; PT encouraged her to get her chair here to progress transfer training and activities towards her baseline. In the meanwhile, will progress strengthening.   Barriers to return to prior living situation: Ability to assess transfers between surfaces as needed in her home (see above.)  Recommendations for discharge: Home with home PT  Rationale for recommendations: Suspect near her functional baseline with sufficient strength.        Entered by: Brittany Dressler 06/10/2020 12:21 PM

## 2020-06-11 ENCOUNTER — APPOINTMENT (OUTPATIENT)
Dept: INTERVENTIONAL RADIOLOGY/VASCULAR | Facility: CLINIC | Age: 65
DRG: 389 | End: 2020-06-11
Attending: SURGERY
Payer: MEDICARE

## 2020-06-11 LAB
ALBUMIN SERPL-MCNC: 2.7 G/DL (ref 3.4–5)
ALBUMIN SERPL-MCNC: NORMAL G/DL (ref 3.4–5)
ALP SERPL-CCNC: 77 U/L (ref 40–150)
ALP SERPL-CCNC: NORMAL U/L (ref 40–150)
ALT SERPL W P-5'-P-CCNC: 22 U/L (ref 0–50)
ALT SERPL W P-5'-P-CCNC: NORMAL U/L (ref 0–50)
ANION GAP SERPL CALCULATED.3IONS-SCNC: 4 MMOL/L (ref 3–14)
ANION GAP SERPL CALCULATED.3IONS-SCNC: NORMAL MMOL/L (ref 6–17)
AST SERPL W P-5'-P-CCNC: 15 U/L (ref 0–45)
AST SERPL W P-5'-P-CCNC: NORMAL U/L (ref 0–45)
BASOPHILS # BLD AUTO: 0 10E9/L (ref 0–0.2)
BASOPHILS NFR BLD AUTO: 0.4 %
BILIRUB DIRECT SERPL-MCNC: 0.2 MG/DL (ref 0–0.2)
BILIRUB DIRECT SERPL-MCNC: NORMAL MG/DL (ref 0–0.2)
BILIRUB SERPL-MCNC: 0.3 MG/DL (ref 0.2–1.3)
BILIRUB SERPL-MCNC: NORMAL MG/DL (ref 0.2–1.3)
BUN SERPL-MCNC: 8 MG/DL (ref 7–30)
BUN SERPL-MCNC: NORMAL MG/DL (ref 7–30)
CALCIUM SERPL-MCNC: 9.7 MG/DL (ref 8.5–10.1)
CALCIUM SERPL-MCNC: NORMAL MG/DL (ref 8.5–10.1)
CHLORIDE SERPL-SCNC: 102 MMOL/L (ref 94–109)
CHLORIDE SERPL-SCNC: NORMAL MMOL/L (ref 94–109)
CO2 SERPL-SCNC: 31 MMOL/L (ref 20–32)
CO2 SERPL-SCNC: NORMAL MMOL/L (ref 20–32)
CREAT SERPL-MCNC: 0.66 MG/DL (ref 0.52–1.04)
CREAT SERPL-MCNC: NORMAL MG/DL (ref 0.52–1.04)
DIFFERENTIAL METHOD BLD: ABNORMAL
DIFFERENTIAL METHOD BLD: NORMAL
EOSINOPHIL # BLD AUTO: 0.4 10E9/L (ref 0–0.7)
EOSINOPHIL NFR BLD AUTO: 4 %
ERYTHROCYTE [DISTWIDTH] IN BLOOD BY AUTOMATED COUNT: 20.3 % (ref 10–15)
ERYTHROCYTE [DISTWIDTH] IN BLOOD BY AUTOMATED COUNT: NORMAL % (ref 10–15)
GFR SERPL CREATININE-BSD FRML MDRD: >90 ML/MIN/{1.73_M2}
GFR SERPL CREATININE-BSD FRML MDRD: NORMAL ML/MIN/{1.73_M2}
GLUCOSE BLDC GLUCOMTR-MCNC: 136 MG/DL (ref 70–99)
GLUCOSE BLDC GLUCOMTR-MCNC: 139 MG/DL (ref 70–99)
GLUCOSE BLDC GLUCOMTR-MCNC: 141 MG/DL (ref 70–99)
GLUCOSE BLDC GLUCOMTR-MCNC: 150 MG/DL (ref 70–99)
GLUCOSE SERPL-MCNC: 139 MG/DL (ref 70–99)
GLUCOSE SERPL-MCNC: NORMAL MG/DL (ref 70–99)
HCT VFR BLD AUTO: 37.3 % (ref 35–47)
HCT VFR BLD AUTO: NORMAL % (ref 35–47)
HGB BLD-MCNC: 11.5 G/DL (ref 11.7–15.7)
HGB BLD-MCNC: NORMAL G/DL (ref 11.7–15.7)
IMM GRANULOCYTES # BLD: 0 10E9/L (ref 0–0.4)
IMM GRANULOCYTES NFR BLD: 0.1 %
INR PPP: 1.14 (ref 0.86–1.14)
INR PPP: NORMAL (ref 0.86–1.14)
LYMPHOCYTES # BLD AUTO: 1.5 10E9/L (ref 0.8–5.3)
LYMPHOCYTES NFR BLD AUTO: 15.6 %
MAGNESIUM SERPL-MCNC: 1.7 MG/DL (ref 1.6–2.3)
MAGNESIUM SERPL-MCNC: NORMAL MG/DL (ref 1.6–2.3)
MCH RBC QN AUTO: 23 PG (ref 26.5–33)
MCH RBC QN AUTO: NORMAL PG (ref 26.5–33)
MCHC RBC AUTO-ENTMCNC: 30.8 G/DL (ref 31.5–36.5)
MCHC RBC AUTO-ENTMCNC: NORMAL G/DL (ref 31.5–36.5)
MCV RBC AUTO: 75 FL (ref 78–100)
MCV RBC AUTO: NORMAL FL (ref 78–100)
MONOCYTES # BLD AUTO: 0.9 10E9/L (ref 0–1.3)
MONOCYTES NFR BLD AUTO: 9.6 %
NEUTROPHILS # BLD AUTO: 6.7 10E9/L (ref 1.6–8.3)
NEUTROPHILS NFR BLD AUTO: 70.3 %
NRBC # BLD AUTO: 0 10*3/UL
NRBC BLD AUTO-RTO: 0 /100
PHOSPHATE SERPL-MCNC: 3.2 MG/DL (ref 2.5–4.5)
PHOSPHATE SERPL-MCNC: NORMAL MG/DL (ref 2.5–4.5)
PLATELET # BLD AUTO: 243 10E9/L (ref 150–450)
PLATELET # BLD AUTO: NORMAL 10E9/L (ref 150–450)
POTASSIUM SERPL-SCNC: 3.6 MMOL/L (ref 3.4–5.3)
POTASSIUM SERPL-SCNC: NORMAL MMOL/L (ref 3.4–5.3)
PROT SERPL-MCNC: 6.6 G/DL (ref 6.8–8.8)
PROT SERPL-MCNC: NORMAL G/DL (ref 6.8–8.8)
RBC # BLD AUTO: 5 10E12/L (ref 3.8–5.2)
RBC # BLD AUTO: NORMAL 10E12/L (ref 3.8–5.2)
SODIUM SERPL-SCNC: 137 MMOL/L (ref 133–144)
SODIUM SERPL-SCNC: NORMAL MMOL/L (ref 133–144)
WBC # BLD AUTO: 9.5 10E9/L (ref 4–11)
WBC # BLD AUTO: NORMAL 10E9/L (ref 4–11)

## 2020-06-11 PROCEDURE — 25000128 H RX IP 250 OP 636: Performed by: PHYSICIAN ASSISTANT

## 2020-06-11 PROCEDURE — 25000125 ZZHC RX 250: Performed by: PHYSICIAN ASSISTANT

## 2020-06-11 PROCEDURE — 83735 ASSAY OF MAGNESIUM: CPT | Performed by: HOSPITALIST

## 2020-06-11 PROCEDURE — 85025 COMPLETE CBC W/AUTO DIFF WBC: CPT | Performed by: HOSPITALIST

## 2020-06-11 PROCEDURE — 49440 PLACE GASTROSTOMY TUBE PERC: CPT

## 2020-06-11 PROCEDURE — 27210742 ZZH CATH CR1

## 2020-06-11 PROCEDURE — 80053 COMPREHEN METABOLIC PANEL: CPT | Performed by: HOSPITALIST

## 2020-06-11 PROCEDURE — 40000239 ZZH STATISTIC VAT ROUNDS

## 2020-06-11 PROCEDURE — G0463 HOSPITAL OUTPT CLINIC VISIT: HCPCS

## 2020-06-11 PROCEDURE — 25000128 H RX IP 250 OP 636: Performed by: INTERNAL MEDICINE

## 2020-06-11 PROCEDURE — 85610 PROTHROMBIN TIME: CPT | Performed by: HOSPITALIST

## 2020-06-11 PROCEDURE — C1769 GUIDE WIRE: HCPCS

## 2020-06-11 PROCEDURE — 99231 SBSQ HOSP IP/OBS SF/LOW 25: CPT | Performed by: SURGERY

## 2020-06-11 PROCEDURE — 27210814 ZZ H TUBE GASTRO CR12

## 2020-06-11 PROCEDURE — 99232 SBSQ HOSP IP/OBS MODERATE 35: CPT | Performed by: INTERNAL MEDICINE

## 2020-06-11 PROCEDURE — 12000000 ZZH R&B MED SURG/OB

## 2020-06-11 PROCEDURE — C9113 INJ PANTOPRAZOLE SODIUM, VIA: HCPCS | Performed by: PHYSICIAN ASSISTANT

## 2020-06-11 PROCEDURE — 84100 ASSAY OF PHOSPHORUS: CPT | Performed by: HOSPITALIST

## 2020-06-11 PROCEDURE — 27210735 ZZH ACCESSORY CR12

## 2020-06-11 PROCEDURE — 82248 BILIRUBIN DIRECT: CPT | Performed by: HOSPITALIST

## 2020-06-11 PROCEDURE — 25000125 ZZHC RX 250: Performed by: HOSPITALIST

## 2020-06-11 PROCEDURE — 00000146 ZZHCL STATISTIC GLUCOSE BY METER IP

## 2020-06-11 RX ORDER — FLUMAZENIL 0.1 MG/ML
0.2 INJECTION, SOLUTION INTRAVENOUS
Status: DISCONTINUED | OUTPATIENT
Start: 2020-06-11 | End: 2020-06-11 | Stop reason: HOSPADM

## 2020-06-11 RX ORDER — NALOXONE HYDROCHLORIDE 0.4 MG/ML
.1-.4 INJECTION, SOLUTION INTRAMUSCULAR; INTRAVENOUS; SUBCUTANEOUS
Status: DISCONTINUED | OUTPATIENT
Start: 2020-06-11 | End: 2020-06-11 | Stop reason: HOSPADM

## 2020-06-11 RX ORDER — FENTANYL CITRATE 50 UG/ML
25-50 INJECTION, SOLUTION INTRAMUSCULAR; INTRAVENOUS EVERY 5 MIN PRN
Status: DISCONTINUED | OUTPATIENT
Start: 2020-06-11 | End: 2020-06-11 | Stop reason: HOSPADM

## 2020-06-11 RX ADMIN — FENTANYL CITRATE 25 MCG: 50 INJECTION, SOLUTION INTRAMUSCULAR; INTRAVENOUS at 11:52

## 2020-06-11 RX ADMIN — MIDAZOLAM HYDROCHLORIDE 0.5 MG: 1 INJECTION, SOLUTION INTRAMUSCULAR; INTRAVENOUS at 12:01

## 2020-06-11 RX ADMIN — HYDROMORPHONE HYDROCHLORIDE 0.5 MG: 1 INJECTION, SOLUTION INTRAMUSCULAR; INTRAVENOUS; SUBCUTANEOUS at 10:53

## 2020-06-11 RX ADMIN — HYDROMORPHONE HYDROCHLORIDE 0.5 MG: 1 INJECTION, SOLUTION INTRAMUSCULAR; INTRAVENOUS; SUBCUTANEOUS at 20:07

## 2020-06-11 RX ADMIN — MICONAZOLE NITRATE: 20 POWDER TOPICAL at 10:12

## 2020-06-11 RX ADMIN — LIDOCAINE HYDROCHLORIDE 20 ML: 10 INJECTION, SOLUTION INFILTRATION; PERINEURAL at 12:09

## 2020-06-11 RX ADMIN — MIDAZOLAM HYDROCHLORIDE 0.5 MG: 1 INJECTION, SOLUTION INTRAMUSCULAR; INTRAVENOUS at 11:52

## 2020-06-11 RX ADMIN — Medication 10 MG: at 09:41

## 2020-06-11 RX ADMIN — LORAZEPAM 0.5 MG: 2 INJECTION INTRAMUSCULAR; INTRAVENOUS at 13:04

## 2020-06-11 RX ADMIN — Medication 10 MG: at 22:42

## 2020-06-11 RX ADMIN — PANTOPRAZOLE SODIUM 40 MG: 40 INJECTION, POWDER, FOR SOLUTION INTRAVENOUS at 09:41

## 2020-06-11 RX ADMIN — HYDROMORPHONE HYDROCHLORIDE 0.5 MG: 1 INJECTION, SOLUTION INTRAMUSCULAR; INTRAVENOUS; SUBCUTANEOUS at 01:40

## 2020-06-11 RX ADMIN — HYDROMORPHONE HYDROCHLORIDE 0.5 MG: 1 INJECTION, SOLUTION INTRAMUSCULAR; INTRAVENOUS; SUBCUTANEOUS at 15:43

## 2020-06-11 RX ADMIN — FENTANYL CITRATE 25 MCG: 50 INJECTION, SOLUTION INTRAMUSCULAR; INTRAVENOUS at 12:01

## 2020-06-11 RX ADMIN — HYDROMORPHONE HYDROCHLORIDE 0.5 MG: 1 INJECTION, SOLUTION INTRAMUSCULAR; INTRAVENOUS; SUBCUTANEOUS at 04:31

## 2020-06-11 RX ADMIN — MIDAZOLAM HYDROCHLORIDE 1 MG: 1 INJECTION, SOLUTION INTRAMUSCULAR; INTRAVENOUS at 11:35

## 2020-06-11 RX ADMIN — LEVOTHYROXINE SODIUM 44 MCG: 20 INJECTION, SOLUTION INTRAVENOUS at 10:54

## 2020-06-11 RX ADMIN — LORAZEPAM 0.5 MG: 2 INJECTION INTRAMUSCULAR; INTRAVENOUS at 19:49

## 2020-06-11 RX ADMIN — FENTANYL CITRATE 50 MCG: 50 INJECTION, SOLUTION INTRAMUSCULAR; INTRAVENOUS at 11:35

## 2020-06-11 RX ADMIN — ASCORBIC ACID, VITAMIN A PALMITATE, CHOLECALCIFEROL, THIAMINE HYDROCHLORIDE, RIBOFLAVIN-5 PHOSPHATE SODIUM, PYRIDOXINE HYDROCHLORIDE, NIACINAMIDE, DEXPANTHENOL, ALPHA-TOCOPHEROL ACETATE, VITAMIN K1, FOLIC ACID, BIOTIN, CYANOCOBALAMIN: 200; 3300; 200; 6; 3.6; 6; 40; 15; 10; 150; 600; 60; 5 INJECTION, SOLUTION INTRAVENOUS at 23:09

## 2020-06-11 RX ADMIN — LORAZEPAM 0.5 MG: 2 INJECTION INTRAMUSCULAR; INTRAVENOUS at 01:55

## 2020-06-11 ASSESSMENT — ACTIVITIES OF DAILY LIVING (ADL)
ADLS_ACUITY_SCORE: 25
ADLS_ACUITY_SCORE: 25
ADLS_ACUITY_SCORE: 27

## 2020-06-11 ASSESSMENT — MIFFLIN-ST. JEOR: SCORE: 1460.63

## 2020-06-11 NOTE — PRE-PROCEDURE
GENERAL PRE-PROCEDURE:   Procedure:  Venting gastrostomy tube placement with jejunal extension for feeding, with moderate sedation  Date/Time:  6/11/2020 9:33 AM    Written consent obtained?: Yes    Risks and benefits: Risks, benefits and alternatives were discussed    Consent given by:  Patient  Patient states understanding of procedure being performed: Yes    Patient's understanding of procedure matches consent: Yes    Procedure consent matches procedure scheduled: Yes    Appropriately NPO:  Yes  ASA Class:  Class 3- Severe systemic disease, definite functional limitations  Mallampati  :  Grade 2- soft palate, base of uvula, tonsillar pillars, and portion of posterior pharyngeal wall visible  Lungs:  Lungs clear with good breath sounds bilaterally  Heart:  Normal heart sounds and rate  History & Physical reviewed:  History and physical reviewed and no updates needed  Statement of review:  I have reviewed the lab findings, diagnostic data, medications, and the plan for sedation

## 2020-06-11 NOTE — PLAN OF CARE
A&Ox4. Anxious at times. VSS on RA. Dilaudid and sched methadone for pain. C/o increase in pain in upper abd. 8/10 pain and tender to touch. MD notified and Xray ordered. NPO. NG at LIS with 450 output. T/R q2. PICC patent and infusing TPN and lipids. BG q4 WNL. Suprapubic catheter with adequate output, declined catheter care d/t bladder spasms when tube is jostled. Gtube placement tomorrow.

## 2020-06-11 NOTE — PROGRESS NOTES
"General Surgery Progress Note    Admission Date: 6/3/2020  Today's Date: 6/11/2020         Assessment:      Bhavani White is a 64 year old female with a complex surgical history admitted 6/3 with small bowel obstruction.   Gastrografin challenge 6/7 revealed contrast in colon, CT 6/8 with likely partial SBO and narrowing of distal stomach. Underwent endoscopy with Dr. Forte 6/8 which showed likely ileus, no stricture. Started on IV Reglan. 1 BM 6/9, intermittent emesis, NG replaced 6/9. IR placement of GJ tube today.         Plan:   - Successful tube placement by IR. Continue G tube to gravity for venting. Keep J tube capped for now.  - Continue TPN, nutrition following  - PCDs, protonix, work with PT as able  - Pain meds prn. Continue to work on decreasing narcotic use, has been very difficult for patient given chronic back pain  - Medical management per hospitalist, appreciate help        Interval History:   Afebrile, vitals stable. Patient with total of 1650cc out NG yesterday. Acute increase abdominal pain in last night. Abdominal XR showed NG tube in stomach, contrast within colon, no obvious free air. Patient's pain resolved with meds. Patient reports some more improvement today, less abdominal pain. Happy to have NG removed. 1 BM yesterday, continues to pass gas. Denies nausea currently.          Physical Exam:   BP (!) 147/88   Pulse 65   Temp 97.6  F (36.4  C) (Oral)   Resp 12   Ht 1.702 m (5' 7\")   Wt 87.3 kg (192 lb 7.4 oz)   SpO2 100%   BMI 30.14 kg/m    I/O last 3 completed shifts:  In: 690 [I.V.:690]  Out: 2600 [Urine:950; Emesis/NG output:1650]  General: NAD, pleasant, alert and awake. Answers questions appropriately  Respiratory: non-labored breathing   Abdomen: somewhat distended but mostly soft. Mild diffuse tenderness to palpation. + bowel sounds. GJ tube in place, dressing dry.     LABS:  Recent Labs   Lab Test 06/11/20  0953 06/11/20  0620 06/10/20  0836   WBC 9.5 Canceled, Test " credited, specimen discarded 10.8   HGB 11.5* Canceled, Test credited, specimen discarded 11.9   MCV 75* Canceled, Test credited, specimen discarded 74*    Canceled, Test credited, specimen discarded 271      Recent Labs   Lab Test 06/11/20  0953 06/11/20  0620 06/10/20  0836   POTASSIUM 3.6 Canceled, Test credited, specimen discarded 3.7   CHLORIDE 102 Canceled, Test credited, specimen discarded 102   CO2 31 Canceled, Test credited, specimen discarded 29   BUN 8 Canceled, Test credited, specimen discarded 7   CR 0.66 Canceled, Test credited, specimen discarded 0.71   ANIONGAP 4 Canceled, Test credited, specimen discarded 5      Recent Labs   Lab Test 06/11/20  0953 06/11/20  0620 06/04/20  0725   ALBUMIN 2.7* Canceled, Test credited, specimen discarded 3.0*   BILITOTAL 0.3 Canceled, Test credited, specimen discarded 0.5   ALT 22 Canceled, Test credited, specimen discarded 21   AST 15 Canceled, Test credited, specimen discarded 16   ALKPHOS 77 Canceled, Test credited, specimen discarded 79    -------------------------------    Lola Jimenez PA-C  [facility]  [phone]

## 2020-06-11 NOTE — PROGRESS NOTES
[facility]    Medicine Progress Note - Hospitalist Service        Date of Admission:  6/3/2020  8:42 PM    Assessment & Plan:   Bhavani White is a 64 year old female with PMHx including extensive surgical hx, morbid obesity with BMI of nearly 60, hypertension, dyslipidemia, DM II, asthma, PUD, chronic pain for which she is on methadone, ANIYA, neurogenic bladder dt hx of spinal stenosis and hypothyroidism.      She has a hx of emergent repair of perforated duodenal ulcer 12/29/2019 with subsequent prolonged hospitalizations for intra-abdominal infections requiring exploratory laparotomy, TRUONG, drain placement with perforation, and wound vac, readmission 3/13/20-  3/16/20 for sepsis and intra-abdominal infections and abdominal wall cellulitis which were treated with antibiotics.      She presented to [facility] ED on 6/3/20 with a 3 day hx of nausea/vomiting and abdominal pain. Was found to have a proximal small bowel obstruction. Was transferred to [facility] on 6/3/2020 in light of her complex surgical history.      Proximal small bowel obstruction  - Presented with 3-day hx of nausea, vomiting, and abdominal pain.   - CT abd/pelvis in ED showed a small bowel obstruction involving the proximal jejunum with transition point in the midline upper abdomen. NGT placed in ED with 1500 ml greenish/yellow output.   - Seen by general surgery service -- felt no emergent surgical intervention was needed and advised to continue conservative cares with decompression and NPO status. No clear indication for antibiotics.   - Condition slowly improved. Tolerated clamping trials and NGT was removed on 6/6 per surgery. Had been passing flatus but minimal BMs. Gastrografin challenge on 6/7showed contrast in the colon. Advanced to low fiber diet on 6/7. Had recurrence of abdominal pain and emesis 6/7 PM. Repeat CT abd/pelvis done 6/8 showed a partial SBO involving the proximal jejunum with suspected narrowing in the distal stomach  related to prior perforation, stomach appeared distended and filled with fluid/air; also noted to have moderate ascites. Underwent EGD/small bowel enteroscopy per Dr. Forte on 6/8 -- had lots of fluid in stomach but did not find any evidence of stricture or obstruction. Unclaer if findings may have been dt ileus. Was started on Reglan on 6/8 PM. Had recurrent emesis on 6/9 PM and NG tube was ultimately replaced with 1300ml output.   - Patient underwent successful gastrojejunostomy tube placement for venting today with IR  - NG tube now discontinued.  - Continue TPN  - Pain control (minimize narcotics as able )and supportive treatment as needed.     Mild hypoxia: Resolved  Chronic Diastolic CHF, without acute exacerbation this stay  Hospitalization in 3/2020 was complicated by hypoxia -- CT at that time showed pleural effusion attibuted to CHF exacerbation. Had reduced EF in 12/2019 during hospitalization with acute illness. Repeat echo in 1/2020 showed EF had normalized. Echo in 3/2020 showed EF 60-65% with grade II diastolic dysfunction. On daily Lasix at home. Noted to require 2L NC this hospital stay but hasn't appeared volume overloaded.  - cont pulmonary toilet  - resume Lasix at discharge or sooner if needed  - has been weaned off O2      Morbid Obesity   ANIYA  -Intermittently adherent to CPAP.     Chronic pain   Neurogenic bladder with chronic suprapubic catheter  Has severe spinal stenosis and takes Methadone as an outpatient.   Was reportedly wheelchair bound prior to 12/2019.   Has a suprapubic catheter in place for chronic retention.  [PTA meds: Cymbalta 120mg daily, gabapentin 1600mg TID, methadone 20mg BID, trazodone 100mg HS, hydroxyzine 50mg BID prn, tizanidine 4mg TID prn]  Most meds held on admission given inability to take po dt SBO. Seen by pain mgmt team on 6/4 in regards to managing pain while NPO -- methadone changed from po to IV while NPO, IV Ativan was added.     Had been placed back on oral  meds on 6/7 when tolerating po (including oral methadone) - changed back to IV (as able) on 6/8 dt recurrent obstruction  - cont methadone 10mg IV q12h until able to take po  - cont IV Ativan prn in lieu of gabapentin  - no PT/OT needs identified this stay     DM II  A1C 7.1 in 1/2020. PTA meds: metformin XR 500mg BID  Managing with sliding scale insulin while NPO this stay. Will plan to resume metformin at discharge. BG trended low at time this stay (when NPO) so D5 was added to IVFs  - cont accuchecks q4h with sliding scale insulin   - D5 added to IVFs on 6/9 AM as BG was trending low    Hypertension  Dyslipidemia  PTA meds: amlodipine 5mg daily, lisinopril 5mg daily, Toprol-XL 12.5mg daily, rosuvastatin 5mg HS  Meds held given admission with SBO, transiently resumed during stay but subsequently stopped on 6/10 given need for NG tube  - has prn hydralazine and metoprolol available (for SBP >160)  - resume oral meds when tolerating po intake      Chronic Microcytic Anemia  -Hgb stable at 11 this stay, MCV in 70s.     Hypothyroid  PTA meds:  levothyroxine 88mcg daily  Levothyroxine initially held while NPO -- resumed on 6/7 when tolerating po.   - changed to IV on 6/8 in light of recurrent obstruction/ileus     Diet: parenteral nutrition - Clinimix E  NPO for Medical/Clinical Reasons Except for: No Exceptions     DVT Prophylaxis: Pneumatic Compression Devices   Lundberg Catheter: in place, indication:    Code Status: Full Code     Disposition Plan    Expected discharge: 2 - 3 days, recommended to TBD  Entered: Flakito Goyal MD 06/11/2020, 7:22 AM        The patient's care was discussed with the Bedside Nurse and Patient.    Flakito Goyal MD  Hospitalist Service  [facility]    ______________________________________________________________________    Interval History   Underwent placement of gastrojejunostomy tube today.  Overall feels better.  Passing flatus.  Pain reasonably well-controlled  "although had increasing pain overnight.    Data reviewed today: I reviewed all medications, new labs and imaging results over the last 24 hours. I personally reviewed no images or EKG's today.    Physical Exam   Vital signs:  Temp: 98.3  F (36.8  C) Temp src: Oral BP: (!) 144/81 Pulse: 65 Heart Rate: 60 Resp: 16 SpO2: 94 % O2 Device: None (Room air) Oxygen Delivery: 2.5 LPM Height: 170.2 cm (5' 7\") Weight: 87.3 kg (192 lb 7.4 oz)  Estimated body mass index is 30.14 kg/m  as calculated from the following:    Height as of this encounter: 1.702 m (5' 7\").    Weight as of this encounter: 87.3 kg (192 lb 7.4 oz).      Wt Readings from Last 2 Encounters:   06/11/20 87.3 kg (192 lb 7.4 oz)   06/03/20 99.8 kg (220 lb)       Gen: AAOX3, NAD  HEENT: no pallor  Resp: CTA B/L, normal WOB  CVS: RRR, no murmur  Abd/GI: Soft, G-tube in place. BS- normoactive.  Mild nonspecific tenderness throughout.  Skin: Warm, dry no rashes  MSK: No joint deformities, no pedal edema  Neuro- CN- intact. No focal deficits.      Data   Recent Labs   Lab 06/11/20  0620 06/10/20  0836 06/09/20  1228 06/09/20  0908  06/04/20  0725   WBC Canceled, Test credited, specimen discarded 10.8  --  11.8*   < > 10.5   HGB Canceled, Test credited, specimen discarded 11.9  --  12.8   < > 11.4*   MCV Canceled, Test credited, specimen discarded 74*  --  74*   < > 74*   PLT Canceled, Test credited, specimen discarded 271  --  212   < > 272   INR Canceled, Test credited, specimen discarded  --   --   --   --   --    NA Canceled, Test credited, specimen discarded 136 139  --    < > 142   POTASSIUM Canceled, Test credited, specimen discarded 3.7 3.9  --    < > 3.5   CHLORIDE Canceled, Test credited, specimen discarded 102 104  --    < > 102   CO2 Canceled, Test credited, specimen discarded 29 29  --    < > 36*   BUN Canceled, Test credited, specimen discarded 7 10  --    < > 21   CR Canceled, Test credited, specimen discarded 0.71 0.72  --    < > 0.78   ANIONGAP " Canceled, Test credited, specimen discarded 5 6  --    < > 4   DORY Canceled, Test credited, specimen discarded 9.6 9.4  --    < > 9.8   GLC Canceled, Test credited, specimen discarded 136* 98  --    < > 95   ALBUMIN Canceled, Test credited, specimen discarded  --   --   --   --  3.0*   PROTTOTAL Canceled, Test credited, specimen discarded  --   --   --   --  6.6*   BILITOTAL Canceled, Test credited, specimen discarded  --   --   --   --  0.5   ALKPHOS Canceled, Test credited, specimen discarded  --   --   --   --  79   ALT Canceled, Test credited, specimen discarded  --   --   --   --  21   AST Canceled, Test credited, specimen discarded  --   --   --   --  16    < > = values in this interval not displayed.       Recent Results (from the past 24 hour(s))   XR Abdomen 2 Views    Narrative    EXAM: XR ABDOMEN 2 VW  LOCATION: [facility]  DATE/TIME: 6/11/2020 12:15 AM    INDICATION: Increased abdominal pain. Small bowel obstruction.  COMPARISON: 06/08/2020      Impression    IMPRESSION: Enteric tube tip in proximal stomach. Contrast within colon. Upper normal colon. Small bowel dilatation on CT not well seen radiographically. Linear lucency overlying the liver. Uncertain if this is artifactual. Difficult to exclude   pneumatosis or portal venous gas radiographically. If further characterization is indicated consider CT correlation. Degenerative change osseous structures.     Medications     dextrose       dextrose 5% and 0.45% NaCl + KCl 20 mEq/L 100 mL/hr at 06/10/20 0851     - MEDICATION INSTRUCTIONS -       - MEDICATION INSTRUCTIONS -       parenteral nutrition - Clinimix E 50 mL/hr at 06/1955       insulin aspart  1-12 Units Subcutaneous Q4H     levothyroxine  44 mcg Intravenous Daily     lipids  250 mL Intravenous Q48H     methadone  10 mg Intravenous Q12H     miconazole   Topical BID     pantoprazole (PROTONIX) IV  40 mg Intravenous Daily with breakfast     sodium chloride (PF)  10 mL  Intracatheter Q8H     sodium chloride (PF)  3 mL Intracatheter Q8H     sodium chloride (PF)  3 mL Intracatheter Q8H

## 2020-06-11 NOTE — PROVIDER NOTIFICATION
Brief update:     Page regarding increased abdominal pain for the past 1.5 hours    Here with small bowel obstruction versus ileus.  Gastroenterology and surgery following.  Was receiving Reglan given initial thought that this represented ileus.    NG in place, still with good output, though acute increase in abdominal discomfort.    There is a plan for IR intervention tomorrow for extension of feeding tube past jejunum    Given acute worsening of pain since approximately 9 PM, will obtain portable upright abdominal film to both confirm feeding tube placement and rule out free air.    Rogers Kramer MD  10:50 PM    Formal read pending, no evidence of free air on my eval of imaging    Rogers Kramer MD   1:44 AM    Touched base w/ nursing. Pt now comfortable, sleeping. Complained of back pain prior to getting low dose dilaudid and sleeping (no change to pain meds w/ above).

## 2020-06-11 NOTE — PROGRESS NOTES
PT attempted treatment at 0900 on 6/11/2020, pt resting after poor night's sleep and wishing to get more rest. Agreeable to getting up in her power chair she had brought in since PT's PM rx the day prior once she's awake.

## 2020-06-11 NOTE — PROGRESS NOTES
[facility] General Surgery:    The patient was seen and examined today. I agree with the note written today by Lola Jimenez PA-C, including the findings and plan as written.  Pertinent aspects from my exam or visit today in regards to medical decision making: Patient remained stable.  Increased abdominal pain overnight, abdominal x-ray performed and unremarkable.  Patient with approximately 1650 mL out of her NG tube.  On exam, abdomen is soft, slightly distended, mildly tender with palpation.  Will proceed with placement of IR percutaneous GJ tube today.    Lidia Akbar MD

## 2020-06-12 ENCOUNTER — APPOINTMENT (OUTPATIENT)
Dept: PHYSICAL THERAPY | Facility: CLINIC | Age: 65
DRG: 389 | End: 2020-06-12
Attending: INTERNAL MEDICINE
Payer: MEDICARE

## 2020-06-12 LAB
ANION GAP SERPL CALCULATED.3IONS-SCNC: 6 MMOL/L (ref 3–14)
BUN SERPL-MCNC: 12 MG/DL (ref 7–30)
CALCIUM SERPL-MCNC: 9.4 MG/DL (ref 8.5–10.1)
CHLORIDE SERPL-SCNC: 102 MMOL/L (ref 94–109)
CO2 SERPL-SCNC: 30 MMOL/L (ref 20–32)
CREAT SERPL-MCNC: 0.68 MG/DL (ref 0.52–1.04)
GFR SERPL CREATININE-BSD FRML MDRD: >90 ML/MIN/{1.73_M2}
GLUCOSE BLDC GLUCOMTR-MCNC: 125 MG/DL (ref 70–99)
GLUCOSE BLDC GLUCOMTR-MCNC: 131 MG/DL (ref 70–99)
GLUCOSE BLDC GLUCOMTR-MCNC: 145 MG/DL (ref 70–99)
GLUCOSE BLDC GLUCOMTR-MCNC: 146 MG/DL (ref 70–99)
GLUCOSE BLDC GLUCOMTR-MCNC: 149 MG/DL (ref 70–99)
GLUCOSE SERPL-MCNC: 128 MG/DL (ref 70–99)
MAGNESIUM SERPL-MCNC: 1.6 MG/DL (ref 1.6–2.3)
PHOSPHATE SERPL-MCNC: 3.7 MG/DL (ref 2.5–4.5)
POTASSIUM SERPL-SCNC: 3.6 MMOL/L (ref 3.4–5.3)
SODIUM SERPL-SCNC: 138 MMOL/L (ref 133–144)

## 2020-06-12 PROCEDURE — 80048 BASIC METABOLIC PNL TOTAL CA: CPT | Performed by: PHYSICIAN ASSISTANT

## 2020-06-12 PROCEDURE — 25000128 H RX IP 250 OP 636: Performed by: PHYSICIAN ASSISTANT

## 2020-06-12 PROCEDURE — 83735 ASSAY OF MAGNESIUM: CPT | Performed by: PHYSICIAN ASSISTANT

## 2020-06-12 PROCEDURE — 00000146 ZZHCL STATISTIC GLUCOSE BY METER IP

## 2020-06-12 PROCEDURE — C9113 INJ PANTOPRAZOLE SODIUM, VIA: HCPCS | Performed by: PHYSICIAN ASSISTANT

## 2020-06-12 PROCEDURE — 99232 SBSQ HOSP IP/OBS MODERATE 35: CPT | Performed by: INTERNAL MEDICINE

## 2020-06-12 PROCEDURE — 97530 THERAPEUTIC ACTIVITIES: CPT | Mod: GP

## 2020-06-12 PROCEDURE — 12000000 ZZH R&B MED SURG/OB

## 2020-06-12 PROCEDURE — 25000125 ZZHC RX 250: Performed by: HOSPITALIST

## 2020-06-12 PROCEDURE — 25000125 ZZHC RX 250: Performed by: PHYSICIAN ASSISTANT

## 2020-06-12 PROCEDURE — 84100 ASSAY OF PHOSPHORUS: CPT | Performed by: PHYSICIAN ASSISTANT

## 2020-06-12 PROCEDURE — 99231 SBSQ HOSP IP/OBS SF/LOW 25: CPT | Performed by: SURGERY

## 2020-06-12 PROCEDURE — 25000128 H RX IP 250 OP 636: Performed by: INTERNAL MEDICINE

## 2020-06-12 PROCEDURE — 40000239 ZZH STATISTIC VAT ROUNDS

## 2020-06-12 RX ADMIN — Medication 10 MG: at 10:15

## 2020-06-12 RX ADMIN — HYDROMORPHONE HYDROCHLORIDE 0.5 MG: 1 INJECTION, SOLUTION INTRAMUSCULAR; INTRAVENOUS; SUBCUTANEOUS at 06:29

## 2020-06-12 RX ADMIN — Medication 10 MG: at 22:31

## 2020-06-12 RX ADMIN — MICONAZOLE NITRATE: 20 POWDER TOPICAL at 09:17

## 2020-06-12 RX ADMIN — HYDROMORPHONE HYDROCHLORIDE 0.5 MG: 1 INJECTION, SOLUTION INTRAMUSCULAR; INTRAVENOUS; SUBCUTANEOUS at 23:21

## 2020-06-12 RX ADMIN — LEVOTHYROXINE SODIUM 44 MCG: 20 INJECTION, SOLUTION INTRAVENOUS at 09:16

## 2020-06-12 RX ADMIN — HYDROMORPHONE HYDROCHLORIDE 0.5 MG: 1 INJECTION, SOLUTION INTRAMUSCULAR; INTRAVENOUS; SUBCUTANEOUS at 19:45

## 2020-06-12 RX ADMIN — I.V. FAT EMULSION 250 ML: 20 EMULSION INTRAVENOUS at 19:48

## 2020-06-12 RX ADMIN — HYDROMORPHONE HYDROCHLORIDE 0.5 MG: 1 INJECTION, SOLUTION INTRAMUSCULAR; INTRAVENOUS; SUBCUTANEOUS at 16:25

## 2020-06-12 RX ADMIN — LORAZEPAM 0.5 MG: 2 INJECTION INTRAMUSCULAR; INTRAVENOUS at 16:39

## 2020-06-12 RX ADMIN — HYDROMORPHONE HYDROCHLORIDE 0.5 MG: 1 INJECTION, SOLUTION INTRAMUSCULAR; INTRAVENOUS; SUBCUTANEOUS at 12:27

## 2020-06-12 RX ADMIN — PANTOPRAZOLE SODIUM 40 MG: 40 INJECTION, POWDER, FOR SOLUTION INTRAVENOUS at 09:16

## 2020-06-12 RX ADMIN — LORAZEPAM 0.5 MG: 2 INJECTION INTRAMUSCULAR; INTRAVENOUS at 12:27

## 2020-06-12 RX ADMIN — HYDROMORPHONE HYDROCHLORIDE 0.5 MG: 1 INJECTION, SOLUTION INTRAMUSCULAR; INTRAVENOUS; SUBCUTANEOUS at 00:08

## 2020-06-12 ASSESSMENT — ACTIVITIES OF DAILY LIVING (ADL)
ADLS_ACUITY_SCORE: 25

## 2020-06-12 NOTE — PLAN OF CARE
A/o x3. No BM or flatus during shift. G tube clamped, no nausea or vomiting noted. TPN infusing at 65ml/hr. Suprapubic catheter with adequate uop. Pain fairly controlled. Refused multiple attempts to get OOB and sit in chair. Turned q 2hrs. Will continue to monitor.

## 2020-06-12 NOTE — PROGRESS NOTES
"Surgery    No complaints  Denies pain  Requesting ice chips  Working with therapies   Denies CP, dyspnea    Gen:  Awake, Alert, NAD  Blood pressure 134/80, pulse 67, temperature 97.3  F (36.3  C), temperature source Axillary, resp. rate 16, height 1.702 m (5' 7\"), weight 87.3 kg (192 lb 7.4 oz), SpO2 96 %, not currently breastfeeding.  Resp - Non-labored  Abdomen - soft, non tender. Feeding tube dressings clean  Extremities - no lower extremity edema or tenderness with palpation    GJ tube to gravity. 550cc/ 24  dark, bilious.    Bhavani White is a 64 year old female with a complex surgical history admitted 6/3 with small bowel obstruction.   Gastrografin challenge 6/7 revealed contrast in colon, CT 6/8 with likely partial SBO and narrowing of distal stomach. Underwent endoscopy with Dr. Forte 6/8 which showed likely ileus, no stricture. Started on IV Reglan. 1 BM 6/9, intermittent emesis, NG replaced 6/9. IR placement of GJ tube yesterday.    - Ice chips ok.   - clamp feeding tube. Patient apprehensive about clamping because she doesn't want to vomit.  Tube can be unclamped anytime and overnight.  - wean TPN when tolerating GJ tube clamping.  - encourage incentive spirometer use    Juan Boyle PA-C  Office: [phone]  Pager: [phone]    "

## 2020-06-12 NOTE — PLAN OF CARE
TPN increased to 65 ml/hr at 8pm last evening. GT draining moderate amount of thin light green effluent.IVP Dilaudid given x2 for BTP  between Methadone,with good effect, BGM's covered appropriately. SP catheter with strong odor to the urine this am.

## 2020-06-12 NOTE — PROGRESS NOTES
[facility] General Surgery:    The patient was seen and examined today. I agree with the note written today by Juan Boyle PA-C, including the findings and plan as written.  Pertinent aspects from my exam or visit today in regards to medical decision making: Patient doing very well today.  Abdominal pain improved.  Denies nausea.  GJ tube placed yesterday by IR.  Will plan to start J-tube feeds tomorrow and slowly advance while slowly weaning TPN.  Patient understands that she will be able to vent her stomach through the G-tube at any time, if she notices increasing abdominal pain or nausea.  On exam, abdomen is soft, nondistended, nontender, GJ tube in place without related drainage.    Lidia Akbar MD

## 2020-06-12 NOTE — PLAN OF CARE
Discharge Planner PT   Patient plan for discharge: Home with   Current status: standby assist bed mobiltiy for bed positional changes (pt has hospital bed), standby assist for stand pivot to chair (has  at home PRN).   Barriers to return to prior living situation: NA  Recommendations for discharge: Home with  assist, home PT HEP (pt has handouts).   Rationale for recommendations: Safe demo of mobility needed, pt feels safe and able to self-manage.        Entered by: Brittany Dressler 06/12/2020 3:20 PM      Physical Therapy Discharge Summary    Reason for therapy discharge:    All goals and outcomes met, no further needs identified.    Progress towards therapy goal(s). See goals on Care Plan in [facility] electronic health record for goal details.  Goals met    Therapy recommendation(s):    Continue home exercise program.

## 2020-06-12 NOTE — PROGRESS NOTES
CLINICAL NUTRITION SERVICES - REASSESSMENT NOTE      Recommendations Ordered by Registered Dietitian (RD): Increase TPN to goal as above --> D15 AA5 at 75 mL/hr + Lipid 250 mL daily:  1778 kcal (26 kcal/kg), 90 g protein (1.3 g/kg), 270 g CHO    Future/Additional Recommendations: If TF employed, recommend goal of Isosource 1.5 at 50 mL/hr to provide:  1800 kcal (26 kcal/kg), 82 g protein (1.2 g/kg), 211 g CHO, 18 g fiber, 912 mL H2O   Malnutrition:  (6/4)   % Weight Loss:  > 7.5% in 3 months (severe malnutrition)  % Intake:  </= 50% for >/= 5 days (severe malnutrition) - will meet tomorrow  Subcutaneous Fat Loss:  Deferred at this time due to inability to see patient   Muscle Loss:  Deferred, see above   Fluid Retention:  None noted     Malnutrition Diagnosis: Severe malnutrition  In Context of:  Acute illness or injury  Chronic illness or disease        EVALUATION OF PROGRESS TOWARD GOALS   Diet:  NPO    Nutrition Support:  Patient started on TPN 6/10, increased to step #2 yesterday, and continues as follows ~    Nutrition Support Parenteral:  Type of Access: PICC  Parenteral Frequency:  Continuous  Parenteral Regimen: D15 AA5 at 65 mL/hr + Lipid 250 mL daily   Total Parenteral Provisions: 1608 kcal (24 kcal/kg and 95% needs), 78 g protein (1.1 g/kg and 98% needs), 234 g CHO  Was previously receiving D5 but this was discontinued yesterday     Plan is for eventual increase to goal TPN (depending on refeeding labs) of D15 AA5 at 75 mL/hr + Lipid 250 mL daily to provide:  1778 kcal (26 kcal/kg), 90 g protein (1.3 g/kg), 270 g CHO       Intake/Tolerance:    K/Mg/Phos all WNL  BGM < 150   Last BM x 1 on 6/9  I/O 2156/1650, weight 87.3 kg (down 8 kg from admit)  NG tube d/c'd yesterday - now with G-tube for drainage (550 mL out)    ASSESSED NUTRITION NEEDS:  DW: 68.4 kg (adjusted)   Energy:   Step #1:  5834-7701 kcal (15-20)   Step #2:  4023-9456 kcal (20-25)   Step #3:  7604-3295 kcal (25-30)   Protein:   g  (1.2-1.5)       NEW FINDINGS:   6/11:  IR Procedure = Gastrojejunostomy Tube Placement     Previous Goals: (6/9)   Diet prabhu >FL vs nutrition support within 24-36 hrs  Evaluation: Met    Previous Nutrition Diagnosis (6/9):   Inadequate oral intake related to altered GI function, nausea, vomiting, abd pain, ileus as evidenced by minimal oral intake x8 days   Evaluation: No change      CURRENT NUTRITION DIAGNOSIS  Inadequate parenteral nutrition infusion related to TPN at step #2 with slow advancement de to refeeding risk as evidenced by meeting ~90% needs from current regimen     INTERVENTIONS  Recommendations / Nutrition Prescription  Increase TPN to goal as above --> D15 AA5 at 75 mL/hr + Lipid 250 mL daily:  1778 kcal (26 kcal/kg), 90 g protein (1.3 g/kg), 270 g CHO     If TF employed, recommend goal of Isosource 1.5 at 50 mL/hr to provide:  1800 kcal (26 kcal/kg), 82 g protein (1.2 g/kg), 211 g CHO, 18 g fiber, 912 mL H2O    Implementation  Collaboration and Referral of Nutrition care:  Discussed above with Pharmacy     Goals  Goal TPN will meet needs until enteral nutrition employed (oral vs TF)    MONITORING AND EVALUATION:  Progress towards goals will be monitored and evaluated per protocol and Practice Guidelines    Julia Jang RD, LD, CNSC   Clinical Dietitian - [facility]

## 2020-06-12 NOTE — PLAN OF CARE
[id number]. A&O x 4. VSS on room air. Assist of 2, pivot to chair or lift. NPO, TPN running at 50ml/h though PICC, blood return noted. G tube to gravity bag with green, brown output. Pain managed with prn dilaudid and ativan. Suprapubic cath bag changed, adequate output. BS audible. Continue plan of care.

## 2020-06-12 NOTE — PROGRESS NOTES
[facility]    Medicine Progress Note - Hospitalist Service        Date of Admission:  6/3/2020  8:42 PM    Assessment & Plan:   Bhavani White is a 64 year old female with PMHx including extensive surgical hx, morbid obesity with BMI of nearly 60, hypertension, dyslipidemia, DM II, asthma, PUD, chronic pain for which she is on methadone, ANIYA, neurogenic bladder dt hx of spinal stenosis and hypothyroidism.      She has a hx of emergent repair of perforated duodenal ulcer 12/29/2019 with subsequent prolonged hospitalizations for intra-abdominal infections requiring exploratory laparotomy, TRUONG, drain placement with perforation, and wound vac, readmission 3/13/20-  3/16/20 for sepsis and intra-abdominal infections and abdominal wall cellulitis which were treated with antibiotics.      She presented to [facility] ED on 6/3/20 with a 3 day hx of nausea/vomiting and abdominal pain. Was found to have a proximal small bowel obstruction. Was transferred to [facility] on 6/3/2020 in light of her complex surgical history.      Proximal small bowel obstruction  - Presented with 3-day hx of nausea, vomiting, and abdominal pain.   - CT abd/pelvis in ED showed a small bowel obstruction involving the proximal jejunum with transition point in the midline upper abdomen. NGT placed in ED with 1500 ml greenish/yellow output.   - Condition slowly improved. Tolerated clamping trials and NGT was removed on 6/6 per surgery. Had been passing flatus but minimal BMs. Gastrografin challenge on 6/7showed contrast in the colon. Advanced to low fiber diet on 6/7. Had recurrence of abdominal pain and emesis 6/7 PM. Repeat CT abd/pelvis done 6/8 showed a partial SBO involving the proximal jejunum with suspected narrowing in the distal stomach related to prior perforation, stomach appeared distended and filled with fluid/air; also noted to have moderate ascites. Underwent EGD/small bowel enteroscopy per Dr. Forte on 6/8 -- had lots of fluid in  stomach but did not find any evidence of stricture or obstruction. Unclaer if findings may have been dt ileus. Was started on Reglan on 6/8 PM. Had recurrent emesis on 6/9 PM and NG tube was ultimately replaced with 1300ml output.   - Patient underwent successful gastrojejunostomy tube placement for venting/feeding on 6/11 with IR  - Continue TPN  - Plan is to gradually start feeding through jejunostomy(G-tube for venting if needed) and wean off TPN  - Pain control (minimize narcotics as able )and supportive treatment as needed.  - General surgery following, appreciate input     Mild hypoxia: Resolved  Chronic Diastolic CHF, without acute exacerbation this stay  Hospitalization in 3/2020 was complicated by hypoxia -- CT at that time showed pleural effusion attibuted to CHF exacerbation. Had reduced EF in 12/2019 during hospitalization with acute illness. Repeat echo in 1/2020 showed EF had normalized. Echo in 3/2020 showed EF 60-65% with grade II diastolic dysfunction. On daily Lasix at home. Noted to require 2L NC this hospital stay but hasn't appeared volume overloaded.  - cont pulmonary toilet  - resume Lasix at discharge or sooner if needed  - has been weaned off O2      Morbid Obesity   ANIYA  -Intermittently adherent to CPAP.     Chronic pain   Neurogenic bladder with chronic suprapubic catheter  Has severe spinal stenosis and takes Methadone as an outpatient.   Was reportedly wheelchair bound prior to 12/2019.   Has a suprapubic catheter in place for chronic retention.  [PTA meds: Cymbalta 120mg daily, gabapentin 1600mg TID, methadone 20mg BID, trazodone 100mg HS, hydroxyzine 50mg BID prn, tizanidine 4mg TID prn]  Most meds held on admission given inability to take po dt SBO. Seen by pain mgmt team on 6/4 in regards to managing pain while NPO -- methadone changed from po to IV while NPO, IV Ativan was added.  - changed back to IV (as able) on 6/8 dt recurrent obstruction  - cont methadone 10mg IV q12h until  able to take po/NJ  - cont IV Ativan prn in lieu of gabapentin  - no PT/OT needs identified this stay     DM II  A1C 7.1 in 1/2020. PTA meds: metformin XR 500mg BID  Managing with sliding scale insulin while NPO this stay. Will plan to resume metformin at discharge. BG trended low at time this stay (when NPO) so D5 was added to IVFs  - cont accuchecks q4h with sliding scale insulin     Hypertension  Dyslipidemia  PTA meds: amlodipine 5mg daily, lisinopril 5mg daily, Toprol-XL 12.5mg daily, rosuvastatin 5mg HS  Meds held given admission with SBO, transiently resumed during stay but subsequently stopped on 6/10 given need for NG tube  - has prn hydralazine and metoprolol available (for SBP >160)  - resume oral meds when tolerating po intake  or via NJ     Chronic Microcytic Anemia  -Hgb stable at 11 this stay, MCV in 70s.     Hypothyroidism  PTA meds:  levothyroxine 88mcg daily  Levothyroxine initially held while NPO -- resumed on 6/7 when tolerating po.   -Continue IV levothyroxine for now.     Diet: parenteral nutrition - Clinimix E  parenteral nutrition - Clinimix E  NPO for Medical/Clinical Reasons Except for: Other, Ice Chips; Specify: NPO For oral intake and gastric feedings for 6 hours post insertion Gastrostomy G/GJ tube. May feed through Jejunal or Distal PORT immediately for GJ tubes ONLY.     DVT Prophylaxis: Pneumatic Compression Devices   Lundberg Catheter: not present  Code Status: Full Code     Disposition Plan    Expected discharge: 2 - 3 days, recommended to TBD  Entered: Flakito Goyal MD 06/12/2020, 12:27 PM        The patient's care was discussed with the Bedside Nurse and Patient.    Flakito Goyal MD  Hospitalist Service  [facility]    ______________________________________________________________________    Interval History   Overall feels better after placement of gastrojejunostomy tube yesterday.  Plan is to gradually start jejunostomy feeding wean off in p.m.  No nausea or  "vomiting.  Afebrile.    Data reviewed today: I reviewed all medications, new labs and imaging results over the last 24 hours. I personally reviewed no images or EKG's today.    Physical Exam   Vital signs:  Temp: 97.6  F (36.4  C) Temp src: Oral BP: 139/75 Pulse: 59 Heart Rate: 69 Resp: 16 SpO2: 95 % O2 Device: None (Room air) Oxygen Delivery: 2 LPM Height: 170.2 cm (5' 7\") Weight: 87.3 kg (192 lb 7.4 oz)  Estimated body mass index is 30.14 kg/m  as calculated from the following:    Height as of this encounter: 1.702 m (5' 7\").    Weight as of this encounter: 87.3 kg (192 lb 7.4 oz).      Wt Readings from Last 2 Encounters:   06/11/20 87.3 kg (192 lb 7.4 oz)   06/03/20 99.8 kg (220 lb)       Gen: AAOX3, NAD  Resp: CTA B/L, normal WOB  CVS: RRR, no murmur  Abd/GI: Soft, G-tube in place. BS- normoactive.  Mild nonspecific tenderness throughout.  Skin: Warm, dry no rashes  MSK: No joint deformities, no pedal edema  Neuro- CN- intact. No focal deficits.      Data   Recent Labs   Lab 06/12/20  0625 06/11/20  0953 06/11/20  0620 06/10/20  0836   WBC  --  9.5 Canceled, Test credited, specimen discarded 10.8   HGB  --  11.5* Canceled, Test credited, specimen discarded 11.9   MCV  --  75* Canceled, Test credited, specimen discarded 74*   PLT  --  243 Canceled, Test credited, specimen discarded 271   INR  --  1.14 Canceled, Test credited, specimen discarded  --     137 Canceled, Test credited, specimen discarded 136   POTASSIUM 3.6 3.6 Canceled, Test credited, specimen discarded 3.7   CHLORIDE 102 102 Canceled, Test credited, specimen discarded 102   CO2 30 31 Canceled, Test credited, specimen discarded 29   BUN 12 8 Canceled, Test credited, specimen discarded 7   CR 0.68 0.66 Canceled, Test credited, specimen discarded 0.71   ANIONGAP 6 4 Canceled, Test credited, specimen discarded 5   DORY 9.4 9.7 Canceled, Test credited, specimen discarded 9.6   * 139* Canceled, Test credited, specimen discarded 136*   ALBUMIN  " --  2.7* Canceled, Test credited, specimen discarded  --    PROTTOTAL  --  6.6* Canceled, Test credited, specimen discarded  --    BILITOTAL  --  0.3 Canceled, Test credited, specimen discarded  --    ALKPHOS  --  77 Canceled, Test credited, specimen discarded  --    ALT  --  22 Canceled, Test credited, specimen discarded  --    AST  --  15 Canceled, Test credited, specimen discarded  --        No results found for this or any previous visit (from the past 24 hour(s)).  Medications     dextrose       - MEDICATION INSTRUCTIONS -       parenteral nutrition - Clinimix E       parenteral nutrition - Clinimix E 65 mL/hr at 06/12/20 0800       insulin aspart  1-12 Units Subcutaneous Q4H     levothyroxine  44 mcg Intravenous Daily     lipids  250 mL Intravenous Q24H     methadone  10 mg Intravenous Q12H     miconazole   Topical BID     pantoprazole (PROTONIX) IV  40 mg Intravenous Daily with breakfast     sodium chloride (PF)  10 mL Intracatheter Q8H

## 2020-06-12 NOTE — PROGRESS NOTES
[facility] Nurse Inpatient Wound Assessment   Reason for consultation: Pannus/groin irritation     Assessment  Pannus, groin wound due to Intertrigo throughout pannus and groin with mild fungal rash to left pannus. Pt has suprapubic catheter that frequently leaks and causes increased moisture to pannus/groin  Status: initial assessment and resolved    Treatment Plan  Abdominal and Groin skin breakdown: BID and PRN  1. Cleanse pannus and groin with yazmin perineal lotion and omega dry wipes/washcloths. Ensure area is completely dry  2. Dust left side of pannus, around suprapubic and groin gently rubbing in.   3. Apply Interdry AG to groin  4. Apply 4x4 split guaze around suprapubic  *Avoid pre moisten wipes.   *Avoid use of diaper as this traps moisture  *Use single covidien pad and limit number of linens underneath patient.    * Allow areas to air out as much as possible   Orders Written  Recommended provider order: none at this time  WO Nurse follow-up plan:signing off  Nursing to notify the Provider(s) and re-consult the WO Nurse if wound(s) deteriorates or new skin concern.    Patient History  According to provider note(s):     Bhavani White is a 64 year old female with PMHx including extensive surgical hx, morbid obesity with BMI of nearly 60, hypertension, dyslipidemia, DM II, asthma, PUD, chronic pain for which she is on methadone, ANIYA, neurogenic bladder dt hx of spinal stenosis and hypothyroidism.     Objective Data  Containment of urine/stool: Incontinence Protocol and Suprapublic catheter    Active Diet Order  Orders Placed This Encounter      NPO for Medical/Clinical Reasons Except for: Other; Specify: NPO For oral intake and gastric feedings for 6 hours post insertion Gastrostomy G/GJ tube. May feed through Jejunal or Distal PORT immediately for GJ tubes ONLY.      Output:   I/O last 3 completed shifts:  In: 257   Out: 1500 [Urine:900; Emesis/NG output:600]    Risk Assessment:   Sensory Perception:  3-->slightly limited  Moisture: 3-->occasionally moist  Activity: 2-->chairfast  Mobility: 2-->very limited  Nutrition: 2-->probably inadequate  Friction and Shear: 2-->potential problem  Joe Score: 14                          Labs:   Recent Labs   Lab 06/11/20  0953   ALBUMIN 2.7*   HGB 11.5*   INR 1.14   WBC 9.5       Physical Exam  Skin inspection: focused pannus/groin    Wound Location:  Pannus/Groin  Date of last photo none taken  Wound History: Pt has suprapubic catheter that leaks causing excessive moisture managed with 4x4 dressing.   Wound description: Denudement and fungal rash resolved. Pt requires preventative and routine skin measures    Interventions  Current support surface: Standard  Low air loss mattress  Current off-loading measures: Pillows  Visual inspection and assessment completed   Wound Care: completed by RN  Supplies: ordered: miconazole and interdry  Education provided: importance of repositioning, plan of care and Off-loading pressure  Discussed plan of care with Patient and Nurse    Pineda Del Castillo RN CWOCN

## 2020-06-13 LAB
GLUCOSE BLDC GLUCOMTR-MCNC: 129 MG/DL (ref 70–99)
GLUCOSE BLDC GLUCOMTR-MCNC: 133 MG/DL (ref 70–99)
GLUCOSE BLDC GLUCOMTR-MCNC: 139 MG/DL (ref 70–99)
GLUCOSE BLDC GLUCOMTR-MCNC: 149 MG/DL (ref 70–99)

## 2020-06-13 PROCEDURE — 25000132 ZZH RX MED GY IP 250 OP 250 PS 637: Mod: GY | Performed by: PHYSICIAN ASSISTANT

## 2020-06-13 PROCEDURE — C9113 INJ PANTOPRAZOLE SODIUM, VIA: HCPCS | Performed by: PHYSICIAN ASSISTANT

## 2020-06-13 PROCEDURE — 25000125 ZZHC RX 250: Performed by: HOSPITALIST

## 2020-06-13 PROCEDURE — 40000239 ZZH STATISTIC VAT ROUNDS

## 2020-06-13 PROCEDURE — 25000125 ZZHC RX 250: Performed by: PHYSICIAN ASSISTANT

## 2020-06-13 PROCEDURE — 27210429 ZZH NUTRITION PRODUCT INTERMEDIATE LITER

## 2020-06-13 PROCEDURE — 25000128 H RX IP 250 OP 636: Performed by: INTERNAL MEDICINE

## 2020-06-13 PROCEDURE — 25000128 H RX IP 250 OP 636: Performed by: PHYSICIAN ASSISTANT

## 2020-06-13 PROCEDURE — 00000146 ZZHCL STATISTIC GLUCOSE BY METER IP

## 2020-06-13 PROCEDURE — 99232 SBSQ HOSP IP/OBS MODERATE 35: CPT | Performed by: PHYSICIAN ASSISTANT

## 2020-06-13 PROCEDURE — 99232 SBSQ HOSP IP/OBS MODERATE 35: CPT | Performed by: INTERNAL MEDICINE

## 2020-06-13 PROCEDURE — 12000000 ZZH R&B MED SURG/OB

## 2020-06-13 RX ORDER — LANOLIN ALCOHOL/MO/W.PET/CERES
3 CREAM (GRAM) TOPICAL
Status: DISCONTINUED | OUTPATIENT
Start: 2020-06-13 | End: 2020-06-14

## 2020-06-13 RX ORDER — DEXTROSE MONOHYDRATE 100 MG/ML
INJECTION, SOLUTION INTRAVENOUS CONTINUOUS PRN
Status: DISCONTINUED | OUTPATIENT
Start: 2020-06-13 | End: 2020-06-16 | Stop reason: HOSPADM

## 2020-06-13 RX ORDER — DEXTROSE MONOHYDRATE 100 MG/ML
INJECTION, SOLUTION INTRAVENOUS CONTINUOUS PRN
Status: DISCONTINUED | OUTPATIENT
Start: 2020-06-13 | End: 2020-06-16

## 2020-06-13 RX ADMIN — HYDROMORPHONE HYDROCHLORIDE 0.5 MG: 1 INJECTION, SOLUTION INTRAMUSCULAR; INTRAVENOUS; SUBCUTANEOUS at 02:31

## 2020-06-13 RX ADMIN — HYDROMORPHONE HYDROCHLORIDE 0.5 MG: 1 INJECTION, SOLUTION INTRAMUSCULAR; INTRAVENOUS; SUBCUTANEOUS at 22:30

## 2020-06-13 RX ADMIN — I.V. FAT EMULSION 250 ML: 20 EMULSION INTRAVENOUS at 20:39

## 2020-06-13 RX ADMIN — HYDROMORPHONE HYDROCHLORIDE 0.5 MG: 1 INJECTION, SOLUTION INTRAMUSCULAR; INTRAVENOUS; SUBCUTANEOUS at 08:52

## 2020-06-13 RX ADMIN — LORAZEPAM 0.5 MG: 2 INJECTION INTRAMUSCULAR; INTRAVENOUS at 20:39

## 2020-06-13 RX ADMIN — HYDROMORPHONE HYDROCHLORIDE 0.5 MG: 1 INJECTION, SOLUTION INTRAMUSCULAR; INTRAVENOUS; SUBCUTANEOUS at 05:32

## 2020-06-13 RX ADMIN — HYDROMORPHONE HYDROCHLORIDE 0.5 MG: 1 INJECTION, SOLUTION INTRAMUSCULAR; INTRAVENOUS; SUBCUTANEOUS at 19:25

## 2020-06-13 RX ADMIN — CARBOXYMETHYLCELLULOSE SODIUM 1 DROP: 10 GEL OPHTHALMIC at 05:31

## 2020-06-13 RX ADMIN — LORAZEPAM 0.5 MG: 2 INJECTION INTRAMUSCULAR; INTRAVENOUS at 01:05

## 2020-06-13 RX ADMIN — ASCORBIC ACID, VITAMIN A PALMITATE, CHOLECALCIFEROL, THIAMINE HYDROCHLORIDE, RIBOFLAVIN-5 PHOSPHATE SODIUM, PYRIDOXINE HYDROCHLORIDE, NIACINAMIDE, DEXPANTHENOL, ALPHA-TOCOPHEROL ACETATE, VITAMIN K1, FOLIC ACID, BIOTIN, CYANOCOBALAMIN: 200; 3300; 200; 6; 3.6; 6; 40; 15; 10; 150; 600; 60; 5 INJECTION, SOLUTION INTRAVENOUS at 06:49

## 2020-06-13 RX ADMIN — LORAZEPAM 0.5 MG: 2 INJECTION INTRAMUSCULAR; INTRAVENOUS at 16:04

## 2020-06-13 RX ADMIN — HYDROMORPHONE HYDROCHLORIDE 0.5 MG: 1 INJECTION, SOLUTION INTRAMUSCULAR; INTRAVENOUS; SUBCUTANEOUS at 16:03

## 2020-06-13 RX ADMIN — LEVOTHYROXINE SODIUM 44 MCG: 20 INJECTION, SOLUTION INTRAVENOUS at 10:36

## 2020-06-13 RX ADMIN — Medication 10 MG: at 10:36

## 2020-06-13 RX ADMIN — Medication 10 MG: at 21:37

## 2020-06-13 RX ADMIN — LORAZEPAM 0.5 MG: 2 INJECTION INTRAMUSCULAR; INTRAVENOUS at 09:13

## 2020-06-13 RX ADMIN — PANTOPRAZOLE SODIUM 40 MG: 40 INJECTION, POWDER, FOR SOLUTION INTRAVENOUS at 08:53

## 2020-06-13 RX ADMIN — HYDROMORPHONE HYDROCHLORIDE 0.5 MG: 1 INJECTION, SOLUTION INTRAMUSCULAR; INTRAVENOUS; SUBCUTANEOUS at 12:06

## 2020-06-13 ASSESSMENT — ACTIVITIES OF DAILY LIVING (ADL)
ADLS_ACUITY_SCORE: 27
ADLS_ACUITY_SCORE: 25
ADLS_ACUITY_SCORE: 27
ADLS_ACUITY_SCORE: 25

## 2020-06-13 NOTE — PROGRESS NOTES
[facility]    Medicine Progress Note - Hospitalist Service        Date of Admission:  6/3/2020  8:42 PM    Assessment & Plan:   Bhavani White is a 64 year old female with PMHx including extensive surgical hx, morbid obesity with BMI of nearly 60, hypertension, dyslipidemia, DM II, asthma, PUD, chronic pain for which she is on methadone, ANIYA, neurogenic bladder dt hx of spinal stenosis and hypothyroidism.      She has a hx of emergent repair of perforated duodenal ulcer 12/29/2019 with subsequent prolonged hospitalizations for intra-abdominal infections requiring exploratory laparotomy, TRUONG, drain placement with perforation, and wound vac, readmission 3/13/20-  3/16/20 for sepsis and intra-abdominal infections and abdominal wall cellulitis which were treated with antibiotics.      She presented to [facility] ED on 6/3/20 with a 3 day hx of nausea/vomiting and abdominal pain. Was found to have a proximal small bowel obstruction. Was transferred to [facility] on 6/3/2020 in light of her complex surgical history.      Proximal small bowel obstruction  - Presented with 3-day hx of nausea, vomiting, and abdominal pain.   - CT abd/pelvis in ED showed a small bowel obstruction involving the proximal jejunum with transition point in the midline upper abdomen. NGT placed in ED with 1500 ml greenish/yellow output.   - Condition slowly improved. Tolerated clamping trials and NGT was removed on 6/6 per surgery. Had been passing flatus but minimal BMs. Gastrografin challenge on 6/7showed contrast in the colon. Advanced to low fiber diet on 6/7. Had recurrence of abdominal pain and emesis 6/7 PM. Repeat CT abd/pelvis done 6/8 showed a partial SBO involving the proximal jejunum with suspected narrowing in the distal stomach related to prior perforation, stomach appeared distended and filled with fluid/air; also noted to have moderate ascites. Underwent EGD/small bowel enteroscopy per Dr. Forte on 6/8 -- had lots of fluid in  stomach but did not find any evidence of stricture or obstruction. Unclaer if findings may have been dt ileus. Was started on Reglan on 6/8 PM. Had recurrent emesis on 6/9 PM and NG tube was ultimately replaced with 1300ml output.   - Patient underwent successful gastrojejunostomy tube placement for venting/feeding on 6/11 with IR  - Continue TPN  - start jejunostomy today(G-tube for venting if needed) and wean off TPN  - Pain control (minimize narcotics as able )and supportive treatment as needed.  - General surgery following, appreciate input     Mild hypoxia: Resolved  Chronic Diastolic CHF, without acute exacerbation this stay  Hospitalization in 3/2020 was complicated by hypoxia -- CT at that time showed pleural effusion attibuted to CHF exacerbation. Had reduced EF in 12/2019 during hospitalization with acute illness. Repeat echo in 1/2020 showed EF had normalized. Echo in 3/2020 showed EF 60-65% with grade II diastolic dysfunction. On daily Lasix at home. Noted to require 2L NC this hospital stay but hasn't appeared volume overloaded.  - cont pulmonary toilet  - resume Lasix at discharge or sooner if needed  - has been weaned off O2      Morbid Obesity   ANIYA  -Intermittently adherent to CPAP.     Chronic pain   Neurogenic bladder with chronic suprapubic catheter  Has severe spinal stenosis and takes Methadone as an outpatient.   Was reportedly wheelchair bound prior to 12/2019.   Has a suprapubic catheter in place for chronic retention.  [PTA meds: Cymbalta 120mg daily, gabapentin 1600mg TID, methadone 20mg BID, trazodone 100mg HS, hydroxyzine 50mg BID prn, tizanidine 4mg TID prn]  Most meds held on admission given inability to take po dt SBO. Seen by pain mgmt team on 6/4 in regards to managing pain while NPO -- methadone changed from po to IV while NPO, IV Ativan was added.  - changed back to IV (as able) on 6/8 dt recurrent obstruction  - cont methadone 10mg IV q12h; likely resume NJ route tomorrow  - no  PT/OT needs identified this stay     DM II  A1C 7.1 in 1/2020. PTA meds: metformin XR 500mg BID  Managing with sliding scale insulin while NPO this stay. Will plan to resume metformin at discharge. BG trended low at time this stay (when NPO) so D5 was added to IVFs  - cont accuchecks q4h with sliding scale insulin     Hypertension  Dyslipidemia  PTA meds: amlodipine 5mg daily, lisinopril 5mg daily, Toprol-XL 12.5mg daily, rosuvastatin 5mg HS  Meds held given admission with SBO, transiently resumed during stay but subsequently stopped on 6/10 given need for NG tube  - has prn hydralazine and metoprolol available (for SBP >160)  - resume oral meds when via NJ in AM     Chronic Microcytic Anemia  -Hgb stable at 11 this stay, MCV in 70s.     Hypothyroidism  PTA meds:  levothyroxine 88mcg daily  Levothyroxine initially held while NPO -- resumed on 6/7 when tolerating po.   -Continue IV levothyroxine for now.     Diet: parenteral nutrition - Clinimix E  NPO for Medical/Clinical Reasons Except for: Other, Ice Chips; Specify: NPO For oral intake and gastric feedings for 6 hours post insertion Gastrostomy G/GJ tube. May feed through Jejunal or Distal PORT immediately for GJ tubes ONLY.     DVT Prophylaxis: Pneumatic Compression Devices   Lundberg Catheter: not present  Code Status: Full Code     Disposition Plan    Expected discharge: 2 - 3 days, recommended to TBD  Entered: Flakito Goyal MD 06/13/2020, 11:09 AM        The patient's care was discussed with the Bedside Nurse and Patient.    Flakito Goyal MD  Hospitalist Service  [facility]    ______________________________________________________________________    Interval History   Overall improved.  Pain much better.  No nausea.  Plan to start jejunostomy feeding today.    Data reviewed today: I reviewed all medications, new labs and imaging results over the last 24 hours. I personally reviewed no images or EKG's today.    Physical Exam   Vital  "signs:  Temp: 97.8  F (36.6  C) Temp src: Oral BP: 132/83 Pulse: 75 Heart Rate: 76 Resp: 16 SpO2: 97 % O2 Device: None (Room air) Oxygen Delivery: 2 LPM Height: 170.2 cm (5' 7\") Weight: 87.3 kg (192 lb 7.4 oz)  Estimated body mass index is 30.14 kg/m  as calculated from the following:    Height as of this encounter: 1.702 m (5' 7\").    Weight as of this encounter: 87.3 kg (192 lb 7.4 oz).      Wt Readings from Last 2 Encounters:   06/11/20 87.3 kg (192 lb 7.4 oz)   06/03/20 99.8 kg (220 lb)       Gen: AAOX3, NAD  Resp: CTA B/L, normal WOB  CVS: RRR, no murmur  Abd/GI: Soft, BS- normoactive.  Mild nonspecific tenderness throughout.  G-tube in place.  Suprapubic catheter in place.  Skin: Warm, dry no rashes  MSK: No joint deformities, no pedal edema  Neuro- CN- intact. No focal deficits.      Data   Recent Labs   Lab 06/12/20  0625 06/11/20  0953 06/11/20  0620 06/10/20  0836   WBC  --  9.5 Canceled, Test credited, specimen discarded 10.8   HGB  --  11.5* Canceled, Test credited, specimen discarded 11.9   MCV  --  75* Canceled, Test credited, specimen discarded 74*   PLT  --  243 Canceled, Test credited, specimen discarded 271   INR  --  1.14 Canceled, Test credited, specimen discarded  --     137 Canceled, Test credited, specimen discarded 136   POTASSIUM 3.6 3.6 Canceled, Test credited, specimen discarded 3.7   CHLORIDE 102 102 Canceled, Test credited, specimen discarded 102   CO2 30 31 Canceled, Test credited, specimen discarded 29   BUN 12 8 Canceled, Test credited, specimen discarded 7   CR 0.68 0.66 Canceled, Test credited, specimen discarded 0.71   ANIONGAP 6 4 Canceled, Test credited, specimen discarded 5   DORY 9.4 9.7 Canceled, Test credited, specimen discarded 9.6   * 139* Canceled, Test credited, specimen discarded 136*   ALBUMIN  --  2.7* Canceled, Test credited, specimen discarded  --    PROTTOTAL  --  6.6* Canceled, Test credited, specimen discarded  --    BILITOTAL  --  0.3 Canceled, Test " credited, specimen discarded  --    ALKPHOS  --  77 Canceled, Test credited, specimen discarded  --    ALT  --  22 Canceled, Test credited, specimen discarded  --    AST  --  15 Canceled, Test credited, specimen discarded  --        No results found for this or any previous visit (from the past 24 hour(s)).  Medications     dextrose       dextrose       - MEDICATION INSTRUCTIONS -       parenteral nutrition - Clinimix E 75 mL/hr at 06/13/20 0649       insulin aspart  1-12 Units Subcutaneous Q4H     levothyroxine  44 mcg Intravenous Daily     lipids  250 mL Intravenous Q24H     methadone  10 mg Intravenous Q12H     pantoprazole (PROTONIX) IV  40 mg Intravenous Daily with breakfast     sodium chloride (PF)  10 mL Intracatheter Q8H

## 2020-06-13 NOTE — PLAN OF CARE
VSS.  Pt c/o of back pain, given dilaudid and ativan with decrease in pain.  TF started at 10/hr in J tube, G tube clamped.  TPN infusing at 75 ml/hr.  Lundberg with good UO.  NPO with ice chips. Turned and repositioned q2hr.   Hypoactive BS.  Pt refusing finger pricks for blood sugar checks, requesting blood be taken from her PICC.  /104 today. Will continue to monitor.

## 2020-06-13 NOTE — CONSULTS
Asked to see patient for TF initiation (also currently on TPN)  Patient was seen for a complete reassessment yesterday - see note dated 6/12/20 for details    Patient currently receiving TPN as follows ~  D15 AA5 at 75 mL/hr + Lipid 250 mL daily:  1778 kcal (26 kcal/kg), 90 g protein (1.3 g/kg), 270 g CHO     No labs today   BGM < 150  No weight today  Last BM 6/9    ASSESSED NEEDS:   DW:  68.4 kg (adjusted)  Energy:  4272-7032 kcal (25-30 kcal/kg)   Protein:   g (1.2-1.5 g/kg)     Patient now with a G-J tube (placed in IR 6/11)    Plan for the following -->  1.  Begin Isosource 1.5 at 10 mL/hr to provide:  360 kcal, 16 g protein, 42 g CHO, 4 g fiber, 182 mL H2O  Flushes 60 mL every 4 hours   2.  Continue current TPN as above     Total (TF + TPN/Lipid)= 2138 kcal (31 kcal/kg and 104% needs), 106 g protein (1.5 g/kg)    If trophic TF tolerated, will plan for the following transition from TPN to TF on 6/14 -->  1.  Increase TF to 20 mL/hr and decrease TPN to 50 mL/hr  2.  After 12 hours, increase TF to 35 mL/hr and decrease TPN to 25 mL/hr  3.  After 12 hours, increase TF to 50 mL/hr (goal) and discontinue TPN and Lipid    Goal TF will be Isosource 1.5 at 50 mL/hr to provide:  1800 kcal (26 kcal/kg), 82 g protein (1.2 g/kg), 211 g CHO, 18 g fiber, 912 mL H2O    Julia Jang, RD, LD, [facility]   Clinical Dietitian - [facility]

## 2020-06-13 NOTE — PROGRESS NOTES
"Surgery    No complaints.   G-tube clamped yesterday  No nausea.   Gravity drainage now with water in bag  Up to chair yesterday    Gen:  Awake, Alert, NAD  Blood pressure 132/83, pulse 75, temperature 97.8  F (36.6  C), temperature source Oral, resp. rate 16, height 1.702 m (5' 7\"), weight 87.3 kg (192 lb 7.4 oz), SpO2 97 %  Resp - non labored  Abdomen - soft, appropriately tender. GJ tube site ok  Extremities - no lower extremity edema or tenderness on palpation    No new labs    A/P   Bhavani White is a 64 year old female with a complex surgical history admitted 6/3 with small bowel obstruction.   Gastrografin challenge 6/7 revealed contrast in colon, CT 6/8 with likely partial SBO and narrowing of distal stomach. Underwent endoscopy with Dr. Forte 6/8 which showed likely ileus, no stricture. Started on IV Reglan. 1 BM 6/9, intermittent emesis, NG replaced 6/9. IR placement of GJ tube 6/11/20.    - Initiate tube feeds via J tube.  - wean TPN accordingly when patient tolerating TF.  - continue PT exercises. Encouragement provided.    Juan Boyle PA-C  Office: [phone]  Pager: [phone]    "

## 2020-06-13 NOTE — PLAN OF CARE
AVSS On RA. Pain controlled with IV dilaudid. PEG to gravity drainage. LS dim. Diet NPO with ice chips. Repositioned in bed for comfort. BS hypo. TPN continued overnight.

## 2020-06-13 NOTE — PLAN OF CARE
"A&Ox4. No BM or flatus during shift, BS fairly active. G tube open to gravity, no nausea or vomiting noted. TPN increased this shift to 75ml/hr. Lipids running 20.8/hr. Suprapubic catheter with adequate uop. Pain fairly controlled, PRN Dilaudid, Ativan and scheduled Methadone given. Pt states pain is always 7-8/10, pain medications per pt \"maybe bring it down to 6-7\". Pt up in wheelchair at beginning of shift with PT. Refusing q2h turn/repo. Will continue to monitor.   "

## 2020-06-14 LAB
ANION GAP SERPL CALCULATED.3IONS-SCNC: 3 MMOL/L (ref 3–14)
BUN SERPL-MCNC: 16 MG/DL (ref 7–30)
CALCIUM SERPL-MCNC: 9.7 MG/DL (ref 8.5–10.1)
CHLORIDE SERPL-SCNC: 104 MMOL/L (ref 94–109)
CO2 SERPL-SCNC: 31 MMOL/L (ref 20–32)
CREAT SERPL-MCNC: 0.65 MG/DL (ref 0.52–1.04)
GFR SERPL CREATININE-BSD FRML MDRD: >90 ML/MIN/{1.73_M2}
GLUCOSE BLDC GLUCOMTR-MCNC: 122 MG/DL (ref 70–99)
GLUCOSE BLDC GLUCOMTR-MCNC: 123 MG/DL (ref 70–99)
GLUCOSE BLDC GLUCOMTR-MCNC: 179 MG/DL (ref 70–99)
GLUCOSE BLDC GLUCOMTR-MCNC: 198 MG/DL (ref 70–99)
GLUCOSE BLDC GLUCOMTR-MCNC: 96 MG/DL (ref 70–99)
GLUCOSE SERPL-MCNC: 102 MG/DL (ref 70–99)
MAGNESIUM SERPL-MCNC: 1.8 MG/DL (ref 1.6–2.3)
PHOSPHATE SERPL-MCNC: 3.7 MG/DL (ref 2.5–4.5)
POTASSIUM SERPL-SCNC: 3.9 MMOL/L (ref 3.4–5.3)
SODIUM SERPL-SCNC: 138 MMOL/L (ref 133–144)

## 2020-06-14 PROCEDURE — 40000239 ZZH STATISTIC VAT ROUNDS

## 2020-06-14 PROCEDURE — 25000132 ZZH RX MED GY IP 250 OP 250 PS 637: Mod: GY | Performed by: PHYSICIAN ASSISTANT

## 2020-06-14 PROCEDURE — 25000125 ZZHC RX 250: Performed by: HOSPITALIST

## 2020-06-14 PROCEDURE — 83735 ASSAY OF MAGNESIUM: CPT | Performed by: PHYSICIAN ASSISTANT

## 2020-06-14 PROCEDURE — 99232 SBSQ HOSP IP/OBS MODERATE 35: CPT | Performed by: INTERNAL MEDICINE

## 2020-06-14 PROCEDURE — 25000128 H RX IP 250 OP 636: Performed by: PHYSICIAN ASSISTANT

## 2020-06-14 PROCEDURE — 25000125 ZZHC RX 250: Performed by: PHYSICIAN ASSISTANT

## 2020-06-14 PROCEDURE — 25000125 ZZHC RX 250: Performed by: DIETITIAN, REGISTERED

## 2020-06-14 PROCEDURE — 80048 BASIC METABOLIC PNL TOTAL CA: CPT | Performed by: PHYSICIAN ASSISTANT

## 2020-06-14 PROCEDURE — 84100 ASSAY OF PHOSPHORUS: CPT | Performed by: PHYSICIAN ASSISTANT

## 2020-06-14 PROCEDURE — 27210429 ZZH NUTRITION PRODUCT INTERMEDIATE LITER

## 2020-06-14 PROCEDURE — 12000000 ZZH R&B MED SURG/OB

## 2020-06-14 PROCEDURE — 00000146 ZZHCL STATISTIC GLUCOSE BY METER IP

## 2020-06-14 PROCEDURE — 25000132 ZZH RX MED GY IP 250 OP 250 PS 637: Mod: GY | Performed by: INTERNAL MEDICINE

## 2020-06-14 PROCEDURE — 99232 SBSQ HOSP IP/OBS MODERATE 35: CPT | Performed by: PHYSICIAN ASSISTANT

## 2020-06-14 PROCEDURE — C9113 INJ PANTOPRAZOLE SODIUM, VIA: HCPCS | Performed by: PHYSICIAN ASSISTANT

## 2020-06-14 PROCEDURE — 25000128 H RX IP 250 OP 636: Performed by: INTERNAL MEDICINE

## 2020-06-14 RX ORDER — TIZANIDINE 2 MG/1
2 TABLET ORAL EVERY 6 HOURS PRN
Status: DISCONTINUED | OUTPATIENT
Start: 2020-06-14 | End: 2020-06-16 | Stop reason: HOSPADM

## 2020-06-14 RX ORDER — GABAPENTIN 800 MG/1
1600 TABLET ORAL 3 TIMES DAILY
Status: DISCONTINUED | OUTPATIENT
Start: 2020-06-15 | End: 2020-06-16 | Stop reason: HOSPADM

## 2020-06-14 RX ORDER — LEVOTHYROXINE SODIUM 88 UG/1
88 TABLET ORAL
Status: DISCONTINUED | OUTPATIENT
Start: 2020-06-15 | End: 2020-06-16 | Stop reason: HOSPADM

## 2020-06-14 RX ORDER — LANOLIN ALCOHOL/MO/W.PET/CERES
3 CREAM (GRAM) TOPICAL AT BEDTIME
Status: DISCONTINUED | OUTPATIENT
Start: 2020-06-14 | End: 2020-06-16 | Stop reason: HOSPADM

## 2020-06-14 RX ORDER — TRAZODONE HYDROCHLORIDE 100 MG/1
100 TABLET ORAL AT BEDTIME
Status: DISCONTINUED | OUTPATIENT
Start: 2020-06-14 | End: 2020-06-16 | Stop reason: HOSPADM

## 2020-06-14 RX ORDER — METHADONE HYDROCHLORIDE 10 MG/1
20 TABLET ORAL 2 TIMES DAILY
Status: DISCONTINUED | OUTPATIENT
Start: 2020-06-14 | End: 2020-06-16 | Stop reason: HOSPADM

## 2020-06-14 RX ADMIN — HYDROMORPHONE HYDROCHLORIDE 0.5 MG: 1 INJECTION, SOLUTION INTRAMUSCULAR; INTRAVENOUS; SUBCUTANEOUS at 14:08

## 2020-06-14 RX ADMIN — HYDROMORPHONE HYDROCHLORIDE 0.5 MG: 1 INJECTION, SOLUTION INTRAMUSCULAR; INTRAVENOUS; SUBCUTANEOUS at 01:46

## 2020-06-14 RX ADMIN — Medication 10 MG: at 08:29

## 2020-06-14 RX ADMIN — LORAZEPAM 0.5 MG: 2 INJECTION INTRAMUSCULAR; INTRAVENOUS at 23:24

## 2020-06-14 RX ADMIN — LORAZEPAM 0.5 MG: 2 INJECTION INTRAMUSCULAR; INTRAVENOUS at 00:42

## 2020-06-14 RX ADMIN — ASCORBIC ACID, VITAMIN A PALMITATE, CHOLECALCIFEROL, THIAMINE HYDROCHLORIDE, RIBOFLAVIN-5 PHOSPHATE SODIUM, PYRIDOXINE HYDROCHLORIDE, NIACINAMIDE, DEXPANTHENOL, ALPHA-TOCOPHEROL ACETATE, VITAMIN K1, FOLIC ACID, BIOTIN, CYANOCOBALAMIN: 200; 3300; 200; 6; 3.6; 6; 40; 15; 10; 150; 600; 60; 5 INJECTION, SOLUTION INTRAVENOUS at 21:34

## 2020-06-14 RX ADMIN — TRAZODONE HYDROCHLORIDE 100 MG: 100 TABLET ORAL at 21:51

## 2020-06-14 RX ADMIN — LORAZEPAM 0.5 MG: 2 INJECTION INTRAMUSCULAR; INTRAVENOUS at 04:26

## 2020-06-14 RX ADMIN — METHADONE HYDROCHLORIDE 20 MG: 10 TABLET ORAL at 20:44

## 2020-06-14 RX ADMIN — HYDROMORPHONE HYDROCHLORIDE 0.5 MG: 1 INJECTION, SOLUTION INTRAMUSCULAR; INTRAVENOUS; SUBCUTANEOUS at 04:26

## 2020-06-14 RX ADMIN — HYDROMORPHONE HYDROCHLORIDE 0.5 MG: 1 INJECTION, SOLUTION INTRAMUSCULAR; INTRAVENOUS; SUBCUTANEOUS at 07:33

## 2020-06-14 RX ADMIN — LEVOTHYROXINE SODIUM 44 MCG: 20 INJECTION, SOLUTION INTRAVENOUS at 08:42

## 2020-06-14 RX ADMIN — LORAZEPAM 0.5 MG: 2 INJECTION INTRAMUSCULAR; INTRAVENOUS at 08:26

## 2020-06-14 RX ADMIN — LORAZEPAM 0.5 MG: 2 INJECTION INTRAMUSCULAR; INTRAVENOUS at 14:08

## 2020-06-14 RX ADMIN — I.V. FAT EMULSION 250 ML: 20 EMULSION INTRAVENOUS at 21:34

## 2020-06-14 RX ADMIN — HYDROMORPHONE HYDROCHLORIDE 0.5 MG: 1 INJECTION, SOLUTION INTRAMUSCULAR; INTRAVENOUS; SUBCUTANEOUS at 10:48

## 2020-06-14 RX ADMIN — HYDROMORPHONE HYDROCHLORIDE 0.5 MG: 1 INJECTION, SOLUTION INTRAMUSCULAR; INTRAVENOUS; SUBCUTANEOUS at 17:28

## 2020-06-14 RX ADMIN — CARBOXYMETHYLCELLULOSE SODIUM 1 DROP: 10 GEL OPHTHALMIC at 09:55

## 2020-06-14 RX ADMIN — MELATONIN 3 MG: 3 TAB ORAL at 21:36

## 2020-06-14 RX ADMIN — HYDROMORPHONE HYDROCHLORIDE 0.5 MG: 1 INJECTION, SOLUTION INTRAMUSCULAR; INTRAVENOUS; SUBCUTANEOUS at 21:36

## 2020-06-14 RX ADMIN — PANTOPRAZOLE SODIUM 40 MG: 40 INJECTION, POWDER, FOR SOLUTION INTRAVENOUS at 08:26

## 2020-06-14 RX ADMIN — TIZANIDINE 2 MG: 2 TABLET ORAL at 21:36

## 2020-06-14 RX ADMIN — LORAZEPAM 0.5 MG: 2 INJECTION INTRAMUSCULAR; INTRAVENOUS at 18:48

## 2020-06-14 ASSESSMENT — ACTIVITIES OF DAILY LIVING (ADL)
ADLS_ACUITY_SCORE: 25

## 2020-06-14 NOTE — PLAN OF CARE
Alert, VSS on RA, Q2 T and R, with supra pubic catheter, , NPO with ice chips, TPN running @ 75ml, Tube feeding @ 10ml, Blood sugar checks, refuse finger prick, Dilaudid for pain, Ativan for anxiety.

## 2020-06-14 NOTE — PROGRESS NOTES
[facility]    Medicine Progress Note - Hospitalist Service        Date of Admission:  6/3/2020  8:42 PM    Assessment & Plan:   Bhavani White is a 64 year old female with PMHx including extensive surgical hx, morbid obesity with BMI of nearly 60, hypertension, dyslipidemia, DM II, asthma, PUD, chronic pain for which she is on methadone, ANIYA, neurogenic bladder dt hx of spinal stenosis and hypothyroidism.      She has a hx of emergent repair of perforated duodenal ulcer 12/29/2019 with subsequent prolonged hospitalizations for intra-abdominal infections requiring exploratory laparotomy, TRUONG, drain placement with perforation, and wound vac, readmission 3/13/20-  3/16/20 for sepsis and intra-abdominal infections and abdominal wall cellulitis which were treated with antibiotics.      She presented to [facility] ED on 6/3/20 with a 3 day hx of nausea/vomiting and abdominal pain. Was found to have a proximal small bowel obstruction. Was transferred to [facility] on 6/3/2020 in light of her complex surgical history.      Proximal small bowel obstruction  - Presented with 3-day hx of nausea, vomiting, and abdominal pain.   - CT abd/pelvis in ED showed a small bowel obstruction involving the proximal jejunum with transition point in the midline upper abdomen. NGT placed in ED with 1500 ml greenish/yellow output.   - Condition slowly improved. Tolerated clamping trials and NGT was removed on 6/6 per surgery. Had been passing flatus but minimal BMs. Gastrografin challenge on 6/7showed contrast in the colon. Advanced to low fiber diet on 6/7. Had recurrence of abdominal pain and emesis 6/7 PM. Repeat CT abd/pelvis done 6/8 showed a partial SBO involving the proximal jejunum with suspected narrowing in the distal stomach related to prior perforation, stomach appeared distended and filled with fluid/air; also noted to have moderate ascites. Underwent EGD/small bowel enteroscopy per Dr. Forte on 6/8 -- had lots of fluid in  stomach but did not find any evidence of stricture or obstruction. Unclaer if findings may have been dt ileus. Was started on Reglan on 6/8 PM. Had recurrent emesis on 6/9 PM and NG tube was ultimately replaced with 1300ml output.   - Patient underwent successful gastrojejunostomy tube placement for venting/feeding on 6/11 with IR  - started jejunostomy feeding 6/13, tolerating well so far.  Increase to goal as able, wean off TPN.  - Pain control (minimize narcotics as able )and supportive treatment as needed.  - General surgery following, appreciate input     Mild hypoxia: Resolved  Chronic Diastolic CHF, without acute exacerbation this stay  Hospitalization in 3/2020 was complicated by hypoxia -- CT at that time showed pleural effusion attibuted to CHF exacerbation. Had reduced EF in 12/2019 during hospitalization with acute illness. Repeat echo in 1/2020 showed EF had normalized. Echo in 3/2020 showed EF 60-65% with grade II diastolic dysfunction. On daily Lasix at home. Noted to require 2L NC this hospital stay but hasn't appeared volume overloaded.  - cont pulmonary toilet  - resume Lasix at discharge  - has been weaned off O2      Morbid Obesity   ANIYA  - Intermittently adherent to CPAP.     Chronic pain   Neurogenic bladder with chronic suprapubic catheter  Has severe spinal stenosis and takes Methadone as an outpatient.   Was reportedly wheelchair bound prior to 12/2019.   Has a suprapubic catheter in place for chronic retention.  [PTA meds: Cymbalta 120mg daily, gabapentin 1600mg TID, methadone 20mg BID, trazodone 100mg HS, hydroxyzine 50mg BID prn, tizanidine 4mg TID prn]  Most meds held on admission given inability to take po dt SBO. Seen by pain mgmt team on 6/4 in regards to managing pain while NPO -- methadone changed from po to IV while NPO, IV - Ativan was added.  - Resume p.o./[place] methadone     DM II  A1C 7.1 in 1/2020. PTA meds: metformin XR 500mg BID  Managing with sliding scale insulin while NPO  this stay. Will plan to resume metformin at discharge. BG trended low at time this stay (when NPO) so D5 was added to IVFs  - cont accuchecks q4h with sliding scale insulin     Hypertension  Dyslipidemia  PTA meds: amlodipine 5mg daily, lisinopril 5mg daily, Toprol-XL 12.5mg daily, rosuvastatin 5mg HS  Meds held given admission with SBO, transiently resumed during stay but subsequently stopped on 6/10 given need for NG tube  - Has been normotensive without medication, continue to hold lisinopril, Toprol-XL and amlodipine for now     Chronic Microcytic Anemia  -Hgb stable at 11 this stay     Hypothyroidism  PTA meds:  levothyroxine 88mcg daily  Levothyroxine initially held while NPO -- resumed on 6/7 when tolerating po.   -Resume prior to admission levothyroxine     Diet: parenteral nutrition - Clinimix E  NPO for Medical/Clinical Reasons Except for: Other, Ice Chips; Specify: NPO For oral intake and gastric feedings for 6 hours post insertion Gastrostomy G/GJ tube. May feed through Jejunal or Distal PORT immediately for GJ tubes ONLY.  Adult Formula Drip Feeding: Continuous Isosource 1.5; Jejunostomy; Goal Rate: 50; mL/hr; Medication - Feeding Tube Flush Frequency: At least 15-30 mL water before and after medication administration and with tube clogging; Amount to Send (Nutritio...     DVT Prophylaxis: Pneumatic Compression Devices   Lundberg Catheter: not present  Code Status: Full Code     Disposition Plan    Expected discharge: 1-2 days, home once able to feed at goal, TPN weaned off and pain adequately controlled  Entered: Flakito Goyal MD 06/14/2020, 9:45 AM        The patient's care was discussed with the Bedside Nurse and Patient.    Flakito Goyal MD  Hospitalist Service  [facility]    ______________________________________________________________________    Interval History   Tolerating jejunostomy feeding well so far.  Pain much better controlled.  Overall feels much better, hopeful to  "discharge home soon.    Data reviewed today: I reviewed all medications, new labs and imaging results over the last 24 hours. I personally reviewed no images or EKG's today.    Physical Exam   Vital signs:  Temp: 98.1  F (36.7  C) Temp src: Oral BP: 108/64 Pulse: 71 Heart Rate: 70 Resp: 18 SpO2: 96 % O2 Device: None (Room air) Oxygen Delivery: 2 LPM Height: 170.2 cm (5' 7\") Weight: 87.3 kg (192 lb 7.4 oz)  Estimated body mass index is 30.14 kg/m  as calculated from the following:    Height as of this encounter: 1.702 m (5' 7\").    Weight as of this encounter: 87.3 kg (192 lb 7.4 oz).      Wt Readings from Last 2 Encounters:   06/11/20 87.3 kg (192 lb 7.4 oz)   06/03/20 99.8 kg (220 lb)       Gen: AAOX3, NAD  Resp: CTA B/L, normal WOB  CVS: RRR, no murmur  Abd/GI: Soft, BS- normoactive.  Mild nonspecific tenderness throughout.  G-tube in place.  Suprapubic catheter in place.  Skin: Warm, dry no rashes  MSK: no pedal edema  Neuro- CN- intact. No focal deficits.      Data   Recent Labs   Lab 06/14/20  0650 06/12/20  0625 06/11/20  0953 06/11/20  0620 06/10/20  0836   WBC  --   --  9.5 Canceled, Test credited, specimen discarded 10.8   HGB  --   --  11.5* Canceled, Test credited, specimen discarded 11.9   MCV  --   --  75* Canceled, Test credited, specimen discarded 74*   PLT  --   --  243 Canceled, Test credited, specimen discarded 271   INR  --   --  1.14 Canceled, Test credited, specimen discarded  --     138 137 Canceled, Test credited, specimen discarded 136   POTASSIUM 3.9 3.6 3.6 Canceled, Test credited, specimen discarded 3.7   CHLORIDE 104 102 102 Canceled, Test credited, specimen discarded 102   CO2 31 30 31 Canceled, Test credited, specimen discarded 29   BUN 16 12 8 Canceled, Test credited, specimen discarded 7   CR 0.65 0.68 0.66 Canceled, Test credited, specimen discarded 0.71   ANIONGAP 3 6 4 Canceled, Test credited, specimen discarded 5   DORY 9.7 9.4 9.7 Canceled, Test credited, specimen discarded " 9.6   * 128* 139* Canceled, Test credited, specimen discarded 136*   ALBUMIN  --   --  2.7* Canceled, Test credited, specimen discarded  --    PROTTOTAL  --   --  6.6* Canceled, Test credited, specimen discarded  --    BILITOTAL  --   --  0.3 Canceled, Test credited, specimen discarded  --    ALKPHOS  --   --  77 Canceled, Test credited, specimen discarded  --    ALT  --   --  22 Canceled, Test credited, specimen discarded  --    AST  --   --  15 Canceled, Test credited, specimen discarded  --        No results found for this or any previous visit (from the past 24 hour(s)).  Medications     dextrose       dextrose       dextrose       - MEDICATION INSTRUCTIONS -       parenteral nutrition - Clinimix E 50 mL/hr at 06/14/20 0839       insulin aspart  1-12 Units Subcutaneous Q4H     levothyroxine  44 mcg Intravenous Daily     lipids  250 mL Intravenous Q24H     melatonin  3 mg Oral At Bedtime     methadone  10 mg Intravenous Q12H     pantoprazole (PROTONIX) IV  40 mg Intravenous Daily with breakfast     sodium chloride (PF)  10 mL Intracatheter Q8H

## 2020-06-14 NOTE — PLAN OF CARE
AVSS on RA. Pain controlled with frequent IV. G tube WDL with tube feedings 10ml/hr. LS dim. Diet NPO with ice chips. Repositioned with assist of 1 in bed. Suprapubic cath with adequate urine output. BS active and audible; passing gas.

## 2020-06-14 NOTE — PROGRESS NOTES
Chart reviewed, patient started on trophic TF 6/13/20    Currently receiving the following TF/TPN regimen ~      Nutrition Support Enteral:  Type of Feeding Tube: G-J  Enteral Frequency:  Continuous  Enteral Regimen: Isosource 1.5 at 10 mL/hr   Total Enteral Provisions: 360 kcal, 16 g protein, 42 g CHO, 4 g fiber, 182 mL H2O  Free Water Flush: 60 mL every 4 hours     Nutrition Support Parenteral:  Type of Access: PICC  Parenteral Frequency:  Continuous  Parenteral Regimen: D15 AA5 at 75 mL/hr + Lipid 250 mL daily   Total Parenteral Provisions: 1778 kcal (26 kcal/kg), 90 g protein (1.3 g/kg), 270 g CHO    Total (TF + TPN/Lipid)= 2138 kcal (31 kcal/kg and 104% needs), 106 g protein (1.5 g/kg)    K/Mg/Phos all WNL  BGM < 150    mL out   I/O 1220/2700, weight 87.3 kg  Last BM 6/9    Orders placed for the following TPN --> TF transition:  1.  Increase TF to 20 mL/hr and decrease TPN to 50 mL/hr   2.  After 12 hours, increase TF to 35 mL/hr and decrease TPN to 25 mL/hr   3.  After 12 hours, increase TF to 50 mL/hr (goal) and discontinue TPN and Lipid     Goal TF will be Isosource 1.5 at 50 mL/hr to provide:  1800 kcal (26 kcal/kg), 82 g protein (1.2 g/kg), 211 g CHO, 18 g fiber, 912 mL H2O     Julia Jang RD, LD, [facility]   Clinical Dietitian - [facility]

## 2020-06-14 NOTE — PROGRESS NOTES
"Surgery    No complaints this morning  Tolerating tube feeds at 20 mils per hour  Denies nausea.  Passing gas.  No BM.    Denies chest pain or dyspnea    Requests trazodone for sleep tizanidine for leg cramps    Gen:  Awake, Alert, NAD  Blood pressure 118/74, pulse 71, temperature 97.3  F (36.3  C), temperature source Oral, resp. rate 18, height 1.702 m (5' 7\"), weight 87.3 kg (192 lb 7.4 oz), SpO2 96 %  Resp - non labored  Abdomen - soft, denies tenderness, non distended. Incisions healing appropriately without erythema or purulent drainage. G tube site ok.  Extremities - no lower extremity edema.    A/P     Bhavani White is a 64 year old female with a complex surgical history admitted 6/3 with small bowel obstruction.   Gastrografin challenge 6/7 revealed contrast in colon, CT 6/8 with likely partial SBO and narrowing of distal stomach. Underwent endoscopy with Dr. Forte 6/8 which showed likely ileus, no stricture. Started on IV Reglan. 1 BM 6/9, intermittent emesis, NG replaced 6/9. IR placement of GJ tube 6/11/20. Tube feeding via J tube initiated 6/13/20.    -Added trazodone and Zanaflex  -Encouraged more movement.  -Continue to increase rate of tube feeding per dietitian recommendation.  -Wean TPN  -Hopeful discharge soon.    Juan Boyle PA-C  Office: [phone]  Pager: [phone]    "

## 2020-06-15 LAB
ALBUMIN SERPL-MCNC: 2.7 G/DL (ref 3.4–5)
ALBUMIN UR-MCNC: NEGATIVE MG/DL
ALP SERPL-CCNC: 80 U/L (ref 40–150)
ALT SERPL W P-5'-P-CCNC: 25 U/L (ref 0–50)
ANION GAP SERPL CALCULATED.3IONS-SCNC: 5 MMOL/L (ref 3–14)
APPEARANCE UR: ABNORMAL
AST SERPL W P-5'-P-CCNC: 20 U/L (ref 0–45)
BACTERIA #/AREA URNS HPF: ABNORMAL /HPF
BILIRUB SERPL-MCNC: 0.5 MG/DL (ref 0.2–1.3)
BILIRUB UR QL STRIP: NEGATIVE
BUN SERPL-MCNC: 16 MG/DL (ref 7–30)
CALCIUM SERPL-MCNC: 9.6 MG/DL (ref 8.5–10.1)
CHLORIDE SERPL-SCNC: 101 MMOL/L (ref 94–109)
CO2 SERPL-SCNC: 29 MMOL/L (ref 20–32)
COLOR UR AUTO: YELLOW
CREAT SERPL-MCNC: 0.63 MG/DL (ref 0.52–1.04)
GFR SERPL CREATININE-BSD FRML MDRD: >90 ML/MIN/{1.73_M2}
GLUCOSE BLDC GLUCOMTR-MCNC: 104 MG/DL (ref 70–99)
GLUCOSE BLDC GLUCOMTR-MCNC: 107 MG/DL (ref 70–99)
GLUCOSE BLDC GLUCOMTR-MCNC: 145 MG/DL (ref 70–99)
GLUCOSE BLDC GLUCOMTR-MCNC: 211 MG/DL (ref 70–99)
GLUCOSE BLDC GLUCOMTR-MCNC: 98 MG/DL (ref 70–99)
GLUCOSE SERPL-MCNC: 133 MG/DL (ref 70–99)
GLUCOSE UR STRIP-MCNC: NEGATIVE MG/DL
HGB UR QL STRIP: ABNORMAL
INR PPP: 1.09 (ref 0.86–1.14)
KETONES UR STRIP-MCNC: NEGATIVE MG/DL
LEUKOCYTE ESTERASE UR QL STRIP: ABNORMAL
MAGNESIUM SERPL-MCNC: 1.9 MG/DL (ref 1.6–2.3)
NITRATE UR QL: NEGATIVE
PH UR STRIP: 6 PH (ref 5–7)
PHOSPHATE SERPL-MCNC: 3.3 MG/DL (ref 2.5–4.5)
POTASSIUM SERPL-SCNC: 3.7 MMOL/L (ref 3.4–5.3)
PROT SERPL-MCNC: 6.5 G/DL (ref 6.8–8.8)
RBC #/AREA URNS AUTO: 16 /HPF (ref 0–2)
SODIUM SERPL-SCNC: 135 MMOL/L (ref 133–144)
SOURCE: ABNORMAL
SP GR UR STRIP: 1.01 (ref 1–1.03)
TRIGL SERPL-MCNC: 161 MG/DL
UROBILINOGEN UR STRIP-MCNC: NORMAL MG/DL (ref 0–2)
WBC #/AREA URNS AUTO: >182 /HPF (ref 0–5)
WBC CLUMPS #/AREA URNS HPF: PRESENT /HPF

## 2020-06-15 PROCEDURE — 25000128 H RX IP 250 OP 636: Performed by: INTERNAL MEDICINE

## 2020-06-15 PROCEDURE — 25000132 ZZH RX MED GY IP 250 OP 250 PS 637: Mod: GY | Performed by: PHYSICIAN ASSISTANT

## 2020-06-15 PROCEDURE — 85610 PROTHROMBIN TIME: CPT | Performed by: PHYSICIAN ASSISTANT

## 2020-06-15 PROCEDURE — 87186 SC STD MICRODIL/AGAR DIL: CPT | Performed by: INTERNAL MEDICINE

## 2020-06-15 PROCEDURE — 87088 URINE BACTERIA CULTURE: CPT | Performed by: INTERNAL MEDICINE

## 2020-06-15 PROCEDURE — 84478 ASSAY OF TRIGLYCERIDES: CPT | Performed by: PHYSICIAN ASSISTANT

## 2020-06-15 PROCEDURE — 25000132 ZZH RX MED GY IP 250 OP 250 PS 637: Mod: GY | Performed by: INTERNAL MEDICINE

## 2020-06-15 PROCEDURE — 25000128 H RX IP 250 OP 636: Performed by: PHYSICIAN ASSISTANT

## 2020-06-15 PROCEDURE — 87086 URINE CULTURE/COLONY COUNT: CPT | Performed by: INTERNAL MEDICINE

## 2020-06-15 PROCEDURE — 84100 ASSAY OF PHOSPHORUS: CPT | Performed by: PHYSICIAN ASSISTANT

## 2020-06-15 PROCEDURE — 83735 ASSAY OF MAGNESIUM: CPT | Performed by: PHYSICIAN ASSISTANT

## 2020-06-15 PROCEDURE — 80053 COMPREHEN METABOLIC PANEL: CPT | Performed by: PHYSICIAN ASSISTANT

## 2020-06-15 PROCEDURE — 00000146 ZZHCL STATISTIC GLUCOSE BY METER IP

## 2020-06-15 PROCEDURE — 27210429 ZZH NUTRITION PRODUCT INTERMEDIATE LITER

## 2020-06-15 PROCEDURE — 12000000 ZZH R&B MED SURG/OB

## 2020-06-15 PROCEDURE — 81001 URINALYSIS AUTO W/SCOPE: CPT | Performed by: INTERNAL MEDICINE

## 2020-06-15 PROCEDURE — 99232 SBSQ HOSP IP/OBS MODERATE 35: CPT | Performed by: INTERNAL MEDICINE

## 2020-06-15 RX ORDER — CEFTRIAXONE 1 G/1
1 INJECTION, POWDER, FOR SOLUTION INTRAMUSCULAR; INTRAVENOUS EVERY 24 HOURS
Status: DISCONTINUED | OUTPATIENT
Start: 2020-06-15 | End: 2020-06-15

## 2020-06-15 RX ORDER — CEFEPIME HYDROCHLORIDE 1 G/1
1 INJECTION, POWDER, FOR SOLUTION INTRAMUSCULAR; INTRAVENOUS EVERY 12 HOURS
Status: DISCONTINUED | OUTPATIENT
Start: 2020-06-15 | End: 2020-06-16

## 2020-06-15 RX ADMIN — HYDROMORPHONE HYDROCHLORIDE 0.5 MG: 1 INJECTION, SOLUTION INTRAMUSCULAR; INTRAVENOUS; SUBCUTANEOUS at 06:33

## 2020-06-15 RX ADMIN — GABAPENTIN 1600 MG: 800 TABLET, FILM COATED ORAL at 11:30

## 2020-06-15 RX ADMIN — HYDROMORPHONE HYDROCHLORIDE 0.5 MG: 1 INJECTION, SOLUTION INTRAMUSCULAR; INTRAVENOUS; SUBCUTANEOUS at 14:04

## 2020-06-15 RX ADMIN — TIZANIDINE 2 MG: 2 TABLET ORAL at 22:41

## 2020-06-15 RX ADMIN — TIZANIDINE 2 MG: 2 TABLET ORAL at 14:04

## 2020-06-15 RX ADMIN — LORAZEPAM 0.5 MG: 2 INJECTION INTRAMUSCULAR; INTRAVENOUS at 03:36

## 2020-06-15 RX ADMIN — GABAPENTIN 1600 MG: 800 TABLET, FILM COATED ORAL at 08:43

## 2020-06-15 RX ADMIN — CARBOXYMETHYLCELLULOSE SODIUM 1 DROP: 10 GEL OPHTHALMIC at 09:41

## 2020-06-15 RX ADMIN — HYDROMORPHONE HYDROCHLORIDE 0.5 MG: 1 INJECTION, SOLUTION INTRAMUSCULAR; INTRAVENOUS; SUBCUTANEOUS at 09:42

## 2020-06-15 RX ADMIN — CEFEPIME HYDROCHLORIDE 1 G: 1 INJECTION, POWDER, FOR SOLUTION INTRAMUSCULAR; INTRAVENOUS at 14:04

## 2020-06-15 RX ADMIN — GABAPENTIN 1600 MG: 800 TABLET, FILM COATED ORAL at 16:51

## 2020-06-15 RX ADMIN — HYDROMORPHONE HYDROCHLORIDE 0.5 MG: 1 INJECTION, SOLUTION INTRAMUSCULAR; INTRAVENOUS; SUBCUTANEOUS at 03:36

## 2020-06-15 RX ADMIN — METHADONE HYDROCHLORIDE 20 MG: 10 TABLET ORAL at 08:43

## 2020-06-15 RX ADMIN — HYDROMORPHONE HYDROCHLORIDE 0.5 MG: 1 INJECTION, SOLUTION INTRAMUSCULAR; INTRAVENOUS; SUBCUTANEOUS at 18:12

## 2020-06-15 RX ADMIN — METHADONE HYDROCHLORIDE 20 MG: 10 TABLET ORAL at 20:45

## 2020-06-15 RX ADMIN — LEVOTHYROXINE SODIUM 88 MCG: 88 TABLET ORAL at 08:43

## 2020-06-15 RX ADMIN — MELATONIN 3 MG: 3 TAB ORAL at 22:41

## 2020-06-15 RX ADMIN — TRAZODONE HYDROCHLORIDE 100 MG: 100 TABLET ORAL at 22:41

## 2020-06-15 RX ADMIN — TIZANIDINE 2 MG: 2 TABLET ORAL at 06:33

## 2020-06-15 RX ADMIN — HYDROMORPHONE HYDROCHLORIDE 0.5 MG: 1 INJECTION, SOLUTION INTRAMUSCULAR; INTRAVENOUS; SUBCUTANEOUS at 21:07

## 2020-06-15 ASSESSMENT — ACTIVITIES OF DAILY LIVING (ADL)
ADLS_ACUITY_SCORE: 23
ADLS_ACUITY_SCORE: 25
ADLS_ACUITY_SCORE: 23

## 2020-06-15 NOTE — PLAN OF CARE
A&Ox4. VSS on RA. Pain controlled with sched meds and IV dilaudid x2, xanaflex x1. G tube WDL with tube feedings 50ml/hr. BG controlled. LS dim.Clear liquid diet. Repositioned as tolerated. Suprapubic cath with adequate urine output. BS active and audible; passing gas. Will continue to monitor.

## 2020-06-15 NOTE — PROGRESS NOTES
[facility]    Medicine Progress Note - Hospitalist Service        Date of Admission:  6/3/2020  8:42 PM    Assessment & Plan:   Bhavani White is a 64 year old female with PMHx including extensive surgical hx, morbid obesity with BMI of nearly 60, hypertension, dyslipidemia, DM II, asthma, PUD, chronic pain for which she is on methadone, ANIYA, neurogenic bladder dt hx of spinal stenosis and hypothyroidism.      She has a hx of emergent repair of perforated duodenal ulcer 12/29/2019 with subsequent prolonged hospitalizations for intra-abdominal infections requiring exploratory laparotomy, TRUONG, drain placement with perforation, and wound vac, readmission 3/13/20-  3/16/20 for sepsis and intra-abdominal infections and abdominal wall cellulitis which were treated with antibiotics.      She presented to [facility] ED on 6/3/20 with a 3 day hx of nausea/vomiting and abdominal pain. Was found to have a proximal small bowel obstruction. Was transferred to [facility] on 6/3/2020 in light of her complex surgical history.      Proximal small bowel obstruction  - Presented with 3-day hx of nausea, vomiting, and abdominal pain.   - CT abd/pelvis in ED showed a small bowel obstruction involving the proximal jejunum with transition point in the midline upper abdomen. NGT placed in ED with 1500 ml greenish/yellow output.   - Condition slowly improved. Tolerated clamping trials and NGT was removed on 6/6 per surgery. Had been passing flatus but minimal BMs. Gastrografin challenge on 6/7showed contrast in the colon. Advanced to low fiber diet on 6/7. Had recurrence of abdominal pain and emesis 6/7 PM. Repeat CT abd/pelvis done 6/8 showed a partial SBO involving the proximal jejunum with suspected narrowing in the distal stomach related to prior perforation, stomach appeared distended and filled with fluid/air; also noted to have moderate ascites. Underwent EGD/small bowel enteroscopy per Dr. Forte on 6/8 -- had lots of fluid in  stomach but did not find any evidence of stricture or obstruction. Unclaer if findings may have been dt ileus. Was started on Reglan on 6/8 PM. Had recurrent emesis on 6/9 PM and NG tube was ultimately replaced with 1300ml output.   - Patient underwent successful gastrojejunostomy tube placement for venting/feeding on 6/11 with IR  - started jejunostomy feeding 6/13, tolerating well so far.  Increase to goal today. TPN weaned off(6/15)  - Pain control (minimize narcotics as able )and supportive treatment as needed.  - General surgery following, appreciate input     Mild hypoxia: Resolved  Chronic Diastolic CHF, without acute exacerbation this stay  Hospitalization in 3/2020 was complicated by hypoxia -- CT at that time showed pleural effusion attibuted to CHF exacerbation. Had reduced EF in 12/2019 during hospitalization with acute illness. Repeat echo in 1/2020 showed EF had normalized. Echo in 3/2020 showed EF 60-65% with grade II diastolic dysfunction. On daily Lasix at home. Noted to require 2L NC this hospital stay but hasn't appeared volume overloaded.  - cont pulmonary toilet  - resume Lasix at discharge  - has been weaned off O2    Urinary tract infection  -Patient has chronic suprapubic catheter, however concerned about UTI this morning as she was having some incontinence from her urethra and suprapubic pain which is typically suggestive of her UTI in her  -UA suggesting UTI  -Past 2 consecutive urine culture positive for Pseudomonas, will empirically place her on cefepime  -Await urine culture    Morbid Obesity   ANIYA  -Intermittently adherent to CPAP.     Chronic pain   Neurogenic bladder with chronic suprapubic catheter  Has severe spinal stenosis and takes Methadone as an outpatient.   Was reportedly wheelchair bound prior to 12/2019.   Has a suprapubic catheter in place for chronic retention.  [PTA meds: Cymbalta 120mg daily, gabapentin 1600mg TID, methadone 20mg BID, trazodone 100mg HS, hydroxyzine  50mg BID prn, tizanidine 4mg TID prn]  -continue p.o./[place] methadone     DM II  A1C 7.1 in 1/2020. PTA meds: metformin XR 500mg BID  -Managing with sliding scale insulin while NPO this stay. Will plan to resume metformin at discharge. BG trended low at time this stay (when NPO) so D5 was added to IVFs  -cont accuchecks q4h with sliding scale insulin while on TF    Hypertension  Dyslipidemia  PTA meds: amlodipine 5mg daily, lisinopril 5mg daily, Toprol-XL 12.5mg daily, rosuvastatin 5mg HS  Meds held given admission with SBO, transiently resumed during stay but subsequently stopped on 6/10 given need for NG tube  -Has been normotensive/low normal without medication, continue to hold lisinopril, Toprol-XL and amlodipine for now     Chronic Microcytic Anemia  -Hgb stable at 11 this stay     Hypothyroidism  PTA meds:  levothyroxine 88mcg daily  Levothyroxine initially held while NPO -- resumed on 6/7 when tolerating po.  -continue prior to admission levothyroxine     Diet: Adult Formula Drip Feeding: Continuous Isosource 1.5; Jejunostomy; Goal Rate: 50; mL/hr; Medication - Feeding Tube Flush Frequency: At least 15-30 mL water before and after medication administration and with tube clogging; Amount to Send (Nutritio...  Advance Diet as Tolerated: Clear Liquid Diet     DVT Prophylaxis: Pneumatic Compression Devices   Lundberg Catheter: not present  Code Status: Full Code     Disposition Plan    Expected discharge: 6/16 once urine culture available, antibiotics plan established and 2 feet arranged for home.      Entered: Flakito Goyal MD 06/15/2020, 12:30 PM        The patient's care was discussed with the Bedside Nurse and Patient.    Flakito Goyal MD  Hospitalist Service  [facility]    ______________________________________________________________________    Interval History   Tolerating jejunostomy feeding very well.  TPN weaned off this morning.  Patient now complaining of suprapubic pain and  "urinary incontinence from urethra and she is worried about the UTI. Aafebrile.  Overall pain much improved.    Data reviewed today: I reviewed all medications, new labs and imaging results over the last 24 hours.     Physical Exam   Vital signs:  Temp: 97.4  F (36.3  C) Temp src: Oral BP: 94/56 Pulse: 58 Heart Rate: 60 Resp: 16 SpO2: 96 % O2 Device: None (Room air) Oxygen Delivery: 2 LPM Height: 170.2 cm (5' 7\") Weight: 87.3 kg (192 lb 7.4 oz)  Estimated body mass index is 30.14 kg/m  as calculated from the following:    Height as of this encounter: 1.702 m (5' 7\").    Weight as of this encounter: 87.3 kg (192 lb 7.4 oz).      Wt Readings from Last 2 Encounters:   06/11/20 87.3 kg (192 lb 7.4 oz)   06/03/20 99.8 kg (220 lb)       Gen: AAOX3, NAD  Resp: CTA B/L, normal WOB  CVS: RRR, no murmur  Abd/GI: Soft, BS- normoactive.  Mild nonspecific tenderness throughout.  G-tube in place.  Suprapubic catheter in place.  MSK: no pedal edema  Neuro- CN- intact. No focal deficits.      Data   Recent Labs   Lab 06/15/20  0643 06/14/20  0650 06/12/20  0625 06/11/20  0953 06/11/20  0620  06/10/20  0836   WBC  --   --   --  9.5 Canceled, Test credited, specimen discarded  --  10.8   HGB  --   --   --  11.5* Canceled, Test credited, specimen discarded  --  11.9   MCV  --   --   --  75* Canceled, Test credited, specimen discarded  --  74*   PLT  --   --   --  243 Canceled, Test credited, specimen discarded  --  271   INR 1.09  --   --  1.14 Canceled, Test credited, specimen discarded  --   --     138 138 137 Canceled, Test credited, specimen discarded  --  136   POTASSIUM 3.7 3.9 3.6 3.6 Canceled, Test credited, specimen discarded  --  3.7   CHLORIDE 101 104 102 102 Canceled, Test credited, specimen discarded  --  102   CO2 29 31 30 31 Canceled, Test credited, specimen discarded  --  29   BUN 16 16 12 8 Canceled, Test credited, specimen discarded  --  7   CR 0.63 0.65 0.68 0.66 Canceled, Test credited, specimen discarded  --  " 0.71   ANIONGAP 5 3 6 4 Canceled, Test credited, specimen discarded  --  5   DORY 9.6 9.7 9.4 9.7 Canceled, Test credited, specimen discarded  --  9.6   * 102* 128* 139* Canceled, Test credited, specimen discarded  --  136*   ALBUMIN 2.7*  --   --  2.7* Canceled, Test credited, specimen discarded   < >  --    PROTTOTAL 6.5*  --   --  6.6* Canceled, Test credited, specimen discarded   < >  --    BILITOTAL 0.5  --   --  0.3 Canceled, Test credited, specimen discarded   < >  --    ALKPHOS 80  --   --  77 Canceled, Test credited, specimen discarded   < >  --    ALT 25  --   --  22 Canceled, Test credited, specimen discarded   < >  --    AST 20  --   --  15 Canceled, Test credited, specimen discarded   < >  --     < > = values in this interval not displayed.       No results found for this or any previous visit (from the past 24 hour(s)).  Medications     dextrose       dextrose       dextrose       - MEDICATION INSTRUCTIONS -         ceFEPIme (MAXIPIME) IV  1 g Intravenous Q12H     gabapentin  1,600 mg Oral TID     insulin aspart  1-12 Units Subcutaneous Q4H     levothyroxine  88 mcg Oral QAM AC     melatonin  3 mg Oral At Bedtime     methadone  20 mg Oral BID     sodium chloride (PF)  10 mL Intracatheter Q8H     traZODone  100 mg Oral At Bedtime

## 2020-06-15 NOTE — PLAN OF CARE
AVSS on RA. Pain controlled with frequent IV. G tube WDL with tube feedings 35ml/hr and TPN at 25ml/hr tolerating well. BG controlled. LS dim. Diet NPO with ice chips. Repositioned with assist of 1 in bed. Suprapubic cath with adequate urine output. BS active and audible; passing gas.

## 2020-06-15 NOTE — PLAN OF CARE
VSS on RA, A/O x4. Lung sounds diminished, BS active. Pain was controlled with scheduled pain medication. Voiding adequately via suprapubic catheter. Up with assist of 2 + lift. Tolerating a clear liquid diet. Continuous tube feeding via g-tube at 50mL/hour.

## 2020-06-15 NOTE — PROGRESS NOTES
CLINICAL NUTRITION SERVICES - REASSESSMENT NOTE      RECOMMENDATIONS FOR MD/PROVIDER TO ORDER:   Recommend increase H20 flushes via FT to meet fluid needs (depending on oral intake), ie 240 mL 4x/day (960 mL) for total fluid 1872 mL (27 mL/kg)   Malnutrition: (6/4)   % Weight Loss:  > 7.5% in 3 months (severe malnutrition)  % Intake:  </= 50% for >/= 5 days (severe malnutrition) - will meet tomorrow  Subcutaneous Fat Loss:  Deferred at this time due to inability to see patient   Muscle Loss:  Deferred, see above   Fluid Retention:  None noted     Malnutrition Diagnosis: Severe malnutrition  In Context of:  Acute illness or injury  Chronic illness or disease        EVALUATION OF PROGRESS TOWARD GOALS   Diet:  CL    Nutrition Support:    - TPN rate had been increased from 65 --> 75 mL/hr on 6/12  - On 6/13, trickle TF was started (10 mL/hr)  - Starting on 6/14, TPN was transitioned to goal TF as follows:  1.  Incr TF to 20 mL/hr and decr TPN to 50 mL/hr (6/14 at 8:38 am)   2.  After 12 hours, incr TF to 35 mL/hr and decr TPN to 25 mL/hr (6/14 at 2040)   3.  After 12 hours, incr TF to 50 mL/hr (goal) and stop TPN & Lipid (6/15 at 0900)     Nutrition Support Enteral:  Type of Feeding Tube:  J-port of GJ tube   Enteral Frequency:  Continuous  Enteral Regimen:  Isosource 1.5 at 50 mL/hr  Total Enteral Provisions:  1800 kcal (26 kcal/kg), 82 g protein (1.2 g/kg), 211 g CHO, 18 g fiber, 912 mL H2O  Free Water Flush:  60 mL every 4 hrs  Total Fluid (TF + flushes):  1272 mL (19 mL/kg)    Intake/Tolerance:    Last recorded stool 6/9.    Labs today noted and acceptable.  Na 135 (NL)  , 179, 198, 96, 123, 107   I/O  Incomplete.  Wt:  87.3 kg (down 8 kg since admission).  BMI:  30.14 (142% IBW).        ASSESSED NUTRITION NEEDS PER APPROVED PRACTICE GUIDELINES:  Dosing Weight 70 kg - adjusted for overwt   Estimated Energy Needs: 5865-3643 kcals (25-30 Kcal/Kg)  Justification: overweight  Estimated Protein Needs: +  grams protein (1.2-1.5+ g pro/Kg)  Justification:  stress      NEW FINDINGS:   Overall pain improved.  Per surgery, diet will be limited to CL --> FL in future.  Pt will possibly be discharged tomorrow.      Previous Goals (6/12):   Goal TPN will meet needs until enteral nutrition employed (oral vs TF)  Evaluation: Not met    Previous Nutrition Diagnosis (6/12):   Inadequate parenteral nutrition infusion related to TPN at step #2 with slow advancement de to refeeding risk as evidenced by meeting ~90% needs from current regimen   Evaluation:  Resolved and not applicable anymore.  Modified as below.      CURRENT NUTRITION DIAGNOSIS  No nutrition diagnosis identified at this time       INTERVENTIONS  Recommendations / Nutrition Prescription  Continue TF as above    Recommend increase H20 flushes via FT to meet fluid needs (depending on oral intake), ie 240 mL 4x/day (960 mL) for total fluid 1872 mL (27 mL/kg)    Implementation  Collaboration and Referral of Nutrition care - Discussed this morning with Pharmacist about TPN discontinuation.    Goals  TF Isosource 1.5 at 50 mL/hr will meet % estimated needs.      MONITORING AND EVALUATION:  Progress towards goals will be monitored and evaluated per protocol and Practice Guidelines    Ekta Patel, RD, LD, CNSC

## 2020-06-15 NOTE — PROGRESS NOTES
Patient states that she feels back to baseline  Denies any abdominal pain.  Reports that she has her baseline chronic discomfort.  She is passing gas.  She has not yet had a bowel movement.  She denies nausea.  Tolerated tube feeds at 20 cc/h.  Examination is benign.  Abdomen is soft and nondistended.  G-tube site looks good.    Surgically stable  Tolerating j-tube feeds  Can slowly increase tube feeds to goal and hopefully wean TPN.

## 2020-06-15 NOTE — PROGRESS NOTES
Question received regarding discharge diet or restrictions.  At this time I would recommend continuing simply at clear to full liquids for the foreseeable future.

## 2020-06-16 ENCOUNTER — HOME INFUSION (PRE-WILLOW HOME INFUSION) (OUTPATIENT)
Dept: PHARMACY | Facility: CLINIC | Age: 65
End: 2020-06-16

## 2020-06-16 VITALS
BODY MASS INDEX: 30.14 KG/M2 | WEIGHT: 192.02 LBS | HEIGHT: 67 IN | HEART RATE: 77 BPM | DIASTOLIC BLOOD PRESSURE: 56 MMHG | OXYGEN SATURATION: 98 % | SYSTOLIC BLOOD PRESSURE: 98 MMHG | TEMPERATURE: 98 F | RESPIRATION RATE: 16 BRPM

## 2020-06-16 LAB
GLUCOSE BLDC GLUCOMTR-MCNC: 110 MG/DL (ref 70–99)
GLUCOSE BLDC GLUCOMTR-MCNC: 111 MG/DL (ref 70–99)
GLUCOSE BLDC GLUCOMTR-MCNC: 128 MG/DL (ref 70–99)
GLUCOSE BLDC GLUCOMTR-MCNC: 168 MG/DL (ref 70–99)

## 2020-06-16 PROCEDURE — 25000128 H RX IP 250 OP 636: Performed by: INTERNAL MEDICINE

## 2020-06-16 PROCEDURE — 40000239 ZZH STATISTIC VAT ROUNDS

## 2020-06-16 PROCEDURE — 25000132 ZZH RX MED GY IP 250 OP 250 PS 637: Mod: GY | Performed by: INTERNAL MEDICINE

## 2020-06-16 PROCEDURE — 99239 HOSP IP/OBS DSCHRG MGMT >30: CPT | Performed by: HOSPITALIST

## 2020-06-16 PROCEDURE — 40000257 ZZH STATISTIC CONSULT NO CHARGE VASC ACCESS

## 2020-06-16 PROCEDURE — 25000128 H RX IP 250 OP 636: Performed by: PHYSICIAN ASSISTANT

## 2020-06-16 PROCEDURE — 00000146 ZZHCL STATISTIC GLUCOSE BY METER IP

## 2020-06-16 PROCEDURE — 25000132 ZZH RX MED GY IP 250 OP 250 PS 637: Mod: GY | Performed by: PHYSICIAN ASSISTANT

## 2020-06-16 RX ORDER — GLUCOSAMINE HCL/CHONDROITIN SU 500-400 MG
CAPSULE ORAL
Qty: 100 EACH | Refills: 3 | Status: ON HOLD | OUTPATIENT
Start: 2020-06-16 | End: 2020-06-30

## 2020-06-16 RX ORDER — AMOXICILLIN 250 MG
2 CAPSULE ORAL 2 TIMES DAILY PRN
Qty: 100 TABLET | Refills: 0
Start: 2020-06-16

## 2020-06-16 RX ORDER — LANCETS
EACH MISCELLANEOUS
Qty: 100 EACH | Refills: 0 | Status: ON HOLD | OUTPATIENT
Start: 2020-06-16 | End: 2020-06-30

## 2020-06-16 RX ADMIN — LEVOTHYROXINE SODIUM 88 MCG: 88 TABLET ORAL at 08:11

## 2020-06-16 RX ADMIN — METHADONE HYDROCHLORIDE 20 MG: 10 TABLET ORAL at 08:11

## 2020-06-16 RX ADMIN — HYDROMORPHONE HYDROCHLORIDE 0.5 MG: 1 INJECTION, SOLUTION INTRAMUSCULAR; INTRAVENOUS; SUBCUTANEOUS at 11:06

## 2020-06-16 RX ADMIN — HYDROMORPHONE HYDROCHLORIDE 0.5 MG: 1 INJECTION, SOLUTION INTRAMUSCULAR; INTRAVENOUS; SUBCUTANEOUS at 04:05

## 2020-06-16 RX ADMIN — GABAPENTIN 1600 MG: 800 TABLET, FILM COATED ORAL at 11:06

## 2020-06-16 RX ADMIN — GABAPENTIN 1600 MG: 800 TABLET, FILM COATED ORAL at 08:11

## 2020-06-16 RX ADMIN — HYDROMORPHONE HYDROCHLORIDE 0.5 MG: 1 INJECTION, SOLUTION INTRAMUSCULAR; INTRAVENOUS; SUBCUTANEOUS at 07:44

## 2020-06-16 RX ADMIN — CEFEPIME HYDROCHLORIDE 1 G: 1 INJECTION, POWDER, FOR SOLUTION INTRAMUSCULAR; INTRAVENOUS at 00:48

## 2020-06-16 RX ADMIN — TIZANIDINE 2 MG: 2 TABLET ORAL at 13:46

## 2020-06-16 RX ADMIN — TIZANIDINE 2 MG: 2 TABLET ORAL at 06:52

## 2020-06-16 ASSESSMENT — MIFFLIN-ST. JEOR: SCORE: 1458.63

## 2020-06-16 ASSESSMENT — ACTIVITIES OF DAILY LIVING (ADL)
ADLS_ACUITY_SCORE: 23

## 2020-06-16 NOTE — PLAN OF CARE
Pt is A&Ox4, VSS, on RA, PRN dilaudid and Zanaflex for pain management. G/J tube site is WDL, dsg is CDI. Pt on continuous TF @ 50ml/hr-goal rate, tolerating well. LS clear, BS+ hypoactive, flatus+. Suprapubic catheter in place, adequate UOP. PICC to RUE is WDL. Pt is assistx2, repositioned per pt tolerance- able to independently position self at times, on clear liquid diet- denies N/V. BG checks q4h.

## 2020-06-16 NOTE — DISCHARGE SUMMARY
Discharge Summary  Hospitalist    Date of Admission:  6/3/2020  Date of Discharge:  6/16/2020  Discharging Provider: Desi Carter MD  Date of Service (when I saw the patient): 06/16/20    Discharge Diagnoses   Proximal small bowel obstruction  Mild hypoxia  Chronic diastolic CHF  Morbid obesity  ANIYA  Chronic pain  Neurogenic bladder with chronic suprapubic catheter  Type 2 diabetes  Hypothyroidism  Chronic microcytic anemia    History of Present Illness   Please refer H & P for details.      Hospital Course   Bhavani White is a 64 year old female with PMHx including extensive surgical hx, morbid obesity with BMI of nearly 60, hypertension, dyslipidemia, DM II, asthma, PUD, chronic pain for which she is on methadone, ANIYA, neurogenic bladder dt hx of spinal stenosis and hypothyroidism.      She has a hx of emergent repair of perforated duodenal ulcer 12/29/2019 with subsequent prolonged hospitalizations for intra-abdominal infections requiring exploratory laparotomy, TRUONG, drain placement with perforation, and wound vac, readmission 3/13/20-  3/16/20 for sepsis and intra-abdominal infections and abdominal wall cellulitis which were treated with antibiotics.      She presented to [facility] ED on 6/3/20 with a 3 day hx of nausea/vomiting and abdominal pain. Was found to have a proximal small bowel obstruction. Was transferred to [facility] on 6/3/2020 in light of her complex surgical history.      Proximal small bowel obstruction  - Presented with 3-day hx of nausea, vomiting, and abdominal pain.   - CT abd/pelvis in ED showed a small bowel obstruction involving the proximal jejunum with transition point in the midline upper abdomen. NGT placed in ED with 1500 ml greenish/yellow output.   - Condition slowly improved. Tolerated clamping trials and NGT was removed on 6/6 per surgery. Had been passing flatus but minimal BMs. Gastrografin challenge on 6/7showed contrast in the colon. Advanced to low fiber diet on 6/7. Had recurrence  of abdominal pain and emesis 6/7 PM. Repeat CT abd/pelvis done 6/8 showed a partial SBO involving the proximal jejunum with suspected narrowing in the distal stomach related to prior perforation, stomach appeared distended and filled with fluid/air; also noted to have moderate ascites. Underwent EGD/small bowel enteroscopy per Dr. Forte on 6/8 -- had lots of fluid in stomach but did not find any evidence of stricture or obstruction. Unclaer if findings may have been dt ileus. Was started on Reglan on 6/8 PM. Had recurrent emesis on 6/9 PM and NG tube was ultimately replaced with 1300ml output.   - Patient underwent successful gastrojejunostomy tube placement for venting/feeding on 6/11 with IR  - started jejunostomy feeding 6/13, tolerating well so far.  Increased to goal . TPN weaned off(6/15)  - Pain control (minimize narcotics as able )and supportive treatment as needed.  - General surgery has signed off.  She is on clear to full liquids at this time.  Per surgery can take orals for comfort/pleasure     Mild hypoxia: Resolved  Chronic Diastolic CHF, without acute exacerbation this stay  Hospitalization in 3/2020 was complicated by hypoxia -- CT at that time showed pleural effusion attibuted to CHF exacerbation. Had reduced EF in 12/2019 during hospitalization with acute illness. Repeat echo in 1/2020 showed EF had normalized. Echo in 3/2020 showed EF 60-65% with grade II diastolic dysfunction. On daily Lasix at home. Noted to require 2L NC this hospital stay but hasn't appeared volume overloaded.  - cont pulmonary toilet  -Not on any diuretic  - has been weaned off O2     Urinary tract infection  -Patient has chronic suprapubic catheter, with history of recurrent UTI  -Past 2 consecutive urine culture positive for Pseudomonas  -Urine culture was repeated here that again shows pulmonary 2 strains of gram-negative yaakov [likely the same Pseudomonas that she is grown before]  -No systemic signs of infection and hence  have decided not to continue antibiotics at this time as the Pseudomonas is likely colonization.     Morbid Obesity   ANIYA  -Intermittently adherent to CPAP.     Chronic pain   Neurogenic bladder with chronic suprapubic catheter  Has severe spinal stenosis and takes Methadone as an outpatient.   Was reportedly wheelchair bound prior to 12/2019.   Has a suprapubic catheter in place for chronic retention.  [PTA meds: Cymbalta 120mg daily, gabapentin 1600mg TID, methadone 20mg BID, trazodone 100mg HS, hydroxyzine 50mg BID prn, tizanidine 4mg TID prn]  -Meds resumed at discharge p.o.     DM II  A1C 7.1 in 1/2020. PTA meds: metformin XR 500mg BID  -Managing with sliding scale insulin during the stay.  Resume metformin at discharge.     Hypertension  Dyslipidemia  PTA meds: amlodipine 5mg daily, lisinopril 5mg daily, Toprol-XL 12.5mg daily, rosuvastatin 5mg HS  Meds held given admission with SBO, transiently resumed during stay but subsequently stopped on 6/10 given need for NG tube  -Has been normotensive/low normal without medication, continue to hold lisinopril, Toprol-XL and amlodipine for now.  Will need to follow-up with PCP regarding this.     Chronic Microcytic Anemia  -Hgb stable at 11 this stay     Hypothyroidism  PTA meds:  levothyroxine 88mcg daily  Levothyroxine initially held while NPO -- resumed on 6/7 when tolerating po.  -continue prior to admission levothyroxine         Desi Carter MD, MD      Pending Results   These results will be followed up by Hospitalist team.  Unresulted Labs Ordered in the Past 30 Days of this Admission     Date and Time Order Name Status Description    6/15/2020 1232 Urine Culture Preliminary           Code Status   Full Code       Primary Care Physician   Skylar Moore    Follow-ups Needed After Discharge   Follow-up Appointments     Follow-up and recommended labs and tests       Follow up with primary care provider, Skylar Moore, within   7 days  for hospital follow- up.  No follow up labs or test are needed.  Follow up with General Surgery in 2 weeks.             Physical Exam   Temp: 98  F (36.7  C) Temp src: Oral BP: (!) 88/48 Pulse: 77 Heart Rate: 77 Resp: 16 SpO2: 98 % O2 Device: None (Room air)    Vitals:    06/09/20 0700 06/11/20 0633 06/16/20 0600   Weight: 90.6 kg (199 lb 11.8 oz) 87.3 kg (192 lb 7.4 oz) 87.1 kg (192 lb 0.3 oz)     Vital Signs with Ranges  Temp:  [97.4  F (36.3  C)-99.5  F (37.5  C)] 98  F (36.7  C)  Pulse:  [74-90] 77  Heart Rate:  [47-81] 77  Resp:  [15-18] 16  BP: ()/(46-76) 88/48  SpO2:  [95 %-98 %] 98 %  I/O last 3 completed shifts:  In: 120 [NG/GT:120]  Out: 2120 [Urine:2120]    Constitutional: Alert, cooperative, no apparent distress  Respiratory: Non labored breathing  Cardiovascular: Regular rate and rhythm, no murmur  GI: Soft, nontender, G-tube and SP catheter in place  Skin: No obvious rash  Neuro: Alert, engages in appropriate conversation, fluent speech, moving all extremities, no facial asymmetry  Psych: Calm and pleasant, no obvious anxiety/ depression      Discharge Disposition   Discharged to home  Condition at discharge: Stable    Consultations This Hospital Stay   SURGERY GENERAL IP CONSULT  CARE TRANSITION RN/SW IP CONSULT  PAIN MANAGEMENT ADULT IP CONSULT  PHYSICAL THERAPY ADULT IP CONSULT  OCCUPATIONAL THERAPY ADULT IP CONSULT  NUTRITION SERVICES ADULT IP CONSULT  WOUND OSTOMY CONTINENCE NURSE  IP CONSULT  GASTROENTEROLOGY IP CONSULT  PHARMACY/NUTRITION TO START AND MANAGE TPN  VASCULAR ACCESS ADULT IP CONSULT  PHARMACY IP CONSULT  PHARMACY IP CONSULT  NUTRITION SERVICES ADULT IP CONSULT  PHARMACY IP CONSULT  PHARMACY IP CONSULT  CARE TRANSITION RN/SW IP CONSULT    Time Spent on this Encounter   Desi ENCARNACION MD, personally saw the patient today and spent greater than 30 minutes discharging this patient.    Discharge Orders      CARE COORDINATION REFERRAL      Home infusion referral      Home Care  PT Referral for Hospital Discharge      Home care nursing referral      Discharge Instructions    If questions or problems arise regarding tube function (e.g. leaking, dislodges, etc.) Contact Interventional Radiology department 24 hours a day.    For procedures that were done at [facility]:    8 AM - 4 PM Monday through Friday Contact the Nurse Line [phone]  For afterhours and weekends [phone]    Ask for the Interventional Radiologist-on call.     For procedures that were done at [facility]:  8 AM - 4 PM Monday through Friday Contact   [phone]  For afterhours and weekends: [phone]   Ask for the Interventional Radiologist on call.       IF DIRECTED by the RADIOLOGIST, related to specific problems with the tube functioning,  go to the Emergency Department.     Reason for your hospital stay    You were hospitalized with small bowel obstruction and started on tube feeds     Follow-up and recommended labs and tests     Follow up with primary care provider, Skylar Moore, within 7 days for hospital follow- up.  No follow up labs or test are needed.  Follow up with General Surgery in 2 weeks.     Activity    Your activity upon discharge: activity as tolerated     Monitor and record    blood glucose 2 times daily before meals     When to contact your care team    Call your primary doctor if you have any of the following: worsening pain, fever, nausea, vomiting     MD face to face encounter    Documentation of Face to Face and Certification for Home Health Services    I certify that patient: Bhavani White is under my care and that I, or a nurse practitioner or physician's assistant working with me, had a face-to-face encounter that meets the physician face-to-face encounter requirements with this patient on: 6/16/2020.    This encounter with the patient was in whole, or in part, for the following medical condition, which is the primary reason for home health care: small bowel  obstruction    I certify that, based on my findings, the following services are medically necessary home health services: Physical Therapy.    My clinical findings support the need for the above services because: Physical Therapy Services are needed to assess and treat the following functional impairments: physical deconditioning.    Further, I certify that my clinical findings support that this patient is homebound (i.e. absences from home require considerable and taxing effort and are for medical reasons or [employer] services or infrequently or of short duration when for other reasons) because: Requires assistance of another person or specialized equipment to access medical services because patient: Requires supervision of another for safe transfer...    Based on the above findings. I certify that this patient is confined to the home and needs intermittent skilled nursing care, physical therapy and/or speech therapy.  The patient is under my care, and I have initiated the establishment of the plan of care.  This patient will be followed by a physician who will periodically review the plan of care.  Physician/Provider to provide follow up care: Skylar Moore    Attending hospital physician (the Medicare certified [place] provider): Desi Carter MD  Physician Signature: See electronic signature associated with these discharge orders.  Date: 6/16/2020     Tubes and drains    You are going home with the following tubes or drains: feeding tube GJ-Tube.   Tube cares per hospital or home care instructions     Full Code     Diet    Follow this diet upon discharge: Orders Placed This Encounter      Adult Formula Drip Feeding: Continuous Isosource 1.5; Jejunostomy; Goal Rate: 50; mL/hr; Medication - Feeding Tube Flush Frequency: At least 15-30 mL water before and after medication administration and with tube clogging;      Clear Liquid Diet          Discharge Medications   Current Discharge Medication List       START taking these medications    Details   alcohol swab prep pads Use to swab area of injection/harry as directed.  Qty: 100 each, Refills: 3    Associated Diagnoses: Type 2 diabetes mellitus with diabetic nephropathy, without long-term current use of insulin (H)      blood glucose (NO BRAND SPECIFIED) test strip Use to test blood sugar 2 times daily or as directed.  Qty: 100 strip, Refills: 6    Comments: To accompany: glucometer per insurance.  Associated Diagnoses: Type 2 diabetes mellitus with diabetic nephropathy, without long-term current use of insulin (H)      blood glucose calibration (NO BRAND SPECIFIED) solution Use to calibrate blood glucose monitor as needed as directed.  Qty: 1 Bottle, Refills: 3    Comments: To accompany: Glucometer per insurance.  Associated Diagnoses: Type 2 diabetes mellitus with diabetic nephropathy, without long-term current use of insulin (H)      blood glucose monitoring (NO BRAND SPECIFIED) meter device kit Use to test blood sugar 2 times daily or as directed.  Qty: 1 kit, Refills: 0    Comments: Preferred blood glucose meter OR supplies to accompany: glucometer per insurance  Associated Diagnoses: Type 2 diabetes mellitus with diabetic nephropathy, without long-term current use of insulin (H)      thin (NO BRAND SPECIFIED) lancets Use with lanceting device.  Qty: 100 each, Refills: 0    Comments: To accompany: per insurance.  Associated Diagnoses: Type 2 diabetes mellitus with diabetic nephropathy, without long-term current use of insulin (H)         CONTINUE these medications which have CHANGED    Details   senna-docusate (SENOKOT-S/PERICOLACE) 8.6-50 MG tablet Take 2 tablets by mouth 2 times daily as needed for constipation  Qty: 100 tablet, Refills: 0    Associated Diagnoses: Acute respiratory failure with hypoxia and hypercapnia (H)         CONTINUE these medications which have NOT CHANGED    Details   bisacodyl (DULCOLAX) 10 MG suppository Place 10 mg rectally 2 times  daily as needed for constipation      docusate sodium (COLACE) 100 MG capsule Take 2 capsules (200 mg) by mouth 2 times daily  Refills: 0    Associated Diagnoses: Drug-induced constipation      DULoxetine (CYMBALTA) 60 MG capsule Take 120 mg by mouth every morning      gabapentin (NEURONTIN) 800 MG tablet Take 1,600 mg by mouth 3 times daily 0800, 1200 and 1800      hypromellose-dextran (ARTIFICAL TEARS) 0.1-0.3 % ophthalmic solution Place 1 drop into both eyes 3 times daily as needed for dry eyes  Refills: 0    Associated Diagnoses: Fibromyalgia      levothyroxine (SYNTHROID/LEVOTHROID) 88 MCG tablet TAKE 1 TABLET(88 MCG) BY MOUTH DAILY  Qty: 90 tablet, Refills: 0    Associated Diagnoses: Hypothyroidism due to acquired atrophy of thyroid      methadone (DOLOPHINE) 10 MG tablet Take 2 tablets (20 mg) by mouth 2 times daily  Qty: 20 tablet, Refills: 0    Associated Diagnoses: Controlled substance agreement signed      pantoprazole (PROTONIX) 40 MG EC tablet Take 1 tablet (40 mg) by mouth daily  Qty: 90 tablet, Refills: 1    Associated Diagnoses: Gastroesophageal reflux disease without esophagitis      rosuvastatin (CRESTOR) 5 MG tablet Take 1 tablet (5 mg) by mouth At Bedtime  Qty: 90 tablet, Refills: 1    Associated Diagnoses: Hyperlipidemia LDL goal <100      traZODone (DESYREL) 100 MG tablet Take 100 mg by mouth At Bedtime      hydrOXYzine (ATARAX) 50 MG tablet 50 mg 2 times daily as needed       ipratropium - albuterol 0.5 mg/2.5 mg/3 mL (DUONEB) 0.5-2.5 (3) MG/3ML neb solution Take 1 vial by nebulization every 6 hours as needed for shortness of breath / dyspnea or wheezing      metFORMIN (GLUCOPHAGE-XR) 500 MG 24 hr tablet Take 500 mg by mouth 2 times daily (with meals)       miconazole (MICATIN/MICRO GUARD) 2 % external powder Apply topically 2 times daily as needed for other (topical candidiasis)  Refills: 0    Associated Diagnoses: Morbid obesity, unspecified obesity type (H)      ondansetron (ZOFRAN-ODT) 4  MG ODT tab Take 1 tablet (4 mg) by mouth every 6 hours as needed for nausea or vomiting    Associated Diagnoses: Abdominopelvic abscess (H)      !! order for DME Equipment being ordered: Nebulizer  Qty: 1 each, Refills: 0    Associated Diagnoses: Moderate persistent asthma with acute exacerbation      !! order for DME Equipment being ordered: Nebulizer  Qty: 1 Package, Refills: 0    Associated Diagnoses: Moderate persistent asthma with acute exacerbation      !! order for DME Equipment being ordered: Hospital Bed, total electric (head, foot, and height adjustments), with any type side rails, with mattress  Qty: 1 each, Refills: 0    Associated Diagnoses: Wheelchair bound; Ankylosing spondylitis (H); Spinal stenosis in cervical region; Lumbar spinal stenosis; Fibromyalgia; Chronic low back pain      !! order for DME Equipment being ordered: Motorized Wheelchair  Qty: 1 each, Refills: 0    Associated Diagnoses: Wheelchair bound; Ankylosing spondylitis (H); Spinal stenosis in cervical region; Lumbar spinal stenosis; Fibromyalgia; Chronic low back pain      !! order for DME Equipment being ordered: Trapeze bar for hospital bed  Qty: 1 each, Refills: 0    Associated Diagnoses: Wheelchair bound; Ankylosing spondylitis (H); Spinal stenosis in cervical region; Lumbar spinal stenosis; Fibromyalgia; Chronic low back pain      oxybutynin ER (DITROPAN XL) 15 MG 24 hr tablet Take 15 mg by mouth daily       polyethylene glycol (MIRALAX/GLYCOLAX) packet Take 17 g by mouth 2 times daily as needed (constipation)  Refills: 0    Associated Diagnoses: Drug-induced constipation      tiZANidine (ZANAFLEX) 4 MG tablet Take 4 mg by mouth 3 times daily as needed for muscle spasms       VENTOLIN  (90 Base) MCG/ACT inhaler INHALE 2 PUFFS INTO THE LUNGS EVERY 6 HOURS AS NEEDED FOR SHORTNESS OF BREATH  Qty: 18 g, Refills: 0    Associated Diagnoses: Moderate persistent asthma with acute exacerbation       !! - Potential duplicate  "medications found. Please discuss with provider.      STOP taking these medications       amLODIPine (NORVASC) 5 MG tablet Comments:   Reason for Stopping:         lisinopril (ZESTRIL) 5 MG tablet Comments:   Reason for Stopping:         metoprolol succinate ER (TOPROL-XL) 25 MG 24 hr tablet Comments:   Reason for Stopping:             Allergies   Allergies   Allergen Reactions     Povidone Iodine      \"mild skin irritation\" per patient report     Toradol [Ketorolac] Other (See Comments)     Pt gets nightmares     Tramadol Other (See Comments)     Data   Most Recent 3 CBC's:  Recent Labs   Lab Test 06/11/20  0953 06/11/20  0620 06/10/20  0836   WBC 9.5 Canceled, Test credited, specimen discarded 10.8   HGB 11.5* Canceled, Test credited, specimen discarded 11.9   MCV 75* Canceled, Test credited, specimen discarded 74*    Canceled, Test credited, specimen discarded 271      Most Recent 3 BMP's:  Recent Labs   Lab Test 06/15/20  0643 06/14/20  0650 06/12/20  0625    138 138   POTASSIUM 3.7 3.9 3.6   CHLORIDE 101 104 102   CO2 29 31 30   BUN 16 16 12   CR 0.63 0.65 0.68   ANIONGAP 5 3 6   DORY 9.6 9.7 9.4   * 102* 128*     Most Recent 2 LFT's:  Recent Labs   Lab Test 06/15/20  0643 06/11/20  0953   AST 20 15   ALT 25 22   ALKPHOS 80 77   BILITOTAL 0.5 0.3     Most Recent INR's and Anticoagulation Dosing History:  Anticoagulation Dose History     Recent Dosing and Labs Latest Ref Rng & Units 7/26/2017 12/29/2019 12/29/2019 1/27/2020 6/11/2020 6/11/2020 6/15/2020    INR 0.86 - 1.14 0.97 Canceled, Test credited 1.61(H) 1.18(H) Canceled, Test credited, specimen discarded 1.14 1.09        Most Recent 3 Troponin's:  Recent Labs   Lab Test 06/03/20  1442 01/18/20  0500 12/30/19  0610   TROPI <0.015 0.037 12.097*     Most Recent Cholesterol Panel:  Recent Labs   Lab Test 06/15/20  0643  05/01/20  1355   CHOL  --   --  113   LDL  --   --  44   HDL  --   --  43*   TRIG 161*   < > 130    < > = values in this " interval not displayed.     Most Recent 6 Bacteria Isolates From Any Culture (See EPIC Reports for Culture Details):  Recent Labs   Lab Test 06/15/20  1145 03/13/20  2213 03/13/20  2209 03/13/20 2125 03/13/20 2122 01/27/20  1035   CULT 50,000 to 100,000 colonies/mL  Gram negative rods  *  50,000 to 100,000 colonies/mL  Strain 2  Gram negative rods  *  Culture in progress 50,000 to 100,000 colonies/mL  Pseudomonas aeruginosa  *  50,000 to 100,000 colonies/mL  Strain 2  Pseudomonas aeruginosa  * No growth No growth Moderate growth  Eikenella corrodens  *  Light growth  Gram positive cocci  No further identification  *  Light growth  Normal bruno    Moderate growth  Eikenella corrodens  Susceptibility testing done on previous specimen  *  Light growth  Candida albicans / dubliniensis  Candida albicans and Candida dubliniensis are not routinely speciated  Susceptibility testing not routinely done  *  Light growth  Enterococcus faecalis  * Moderate growth  Streptococcus mitis group  Susceptibility testing not routinely done  *  Moderate growth  Haemophilus parainfluenzae  Susceptibility testing not routinely done  *  On day 1, isolated in broth only:  Candida glabrata complex  Susceptibility testing not routinely done  *  No anaerobes isolated     Most Recent TSH, T4 and A1c Labs:  Recent Labs   Lab Test 06/04/20  0725  10/28/19  1459  08/15/18  1224   TSH  --   --  0.86  --  0.28*   T4  --   --   --   --  1.03   A1C 6.0*   < > 7.3*   < > 6.0*    < > = values in this interval not displayed.       Results for orders placed or performed during the hospital encounter of 06/03/20   XR Abdomen 1 View    Narrative    XR ABDOMEN ONE VIEW   6/4/2020  5:35 PM     HISTORY: Verify nasogastric tube placement.      Impression    IMPRESSION: Supine view of the abdomen is performed. Nasogastric tube  extends below the left hemidiaphragm overlying the expected region of  the stomach. Probable colonic constipation.  Degenerative scoliotic  deformity lumbar spine is noted.    LEXIS OSORIO MD   XR Abdomen 1 View    Narrative    XR ABDOMEN ONE VIEW   6/7/2020 7:07 PM     HISTORY: Small bowel obstruction, assess for resolution. Received  gastrografin this afternoon.    COMPARISON: Abdominal x-rays dated 6/4/2020.      FINDINGS:  Since the prior study there has been oral contrast  administration which is seen throughout the colon and in some small  bowel loops. There is amorphous contrast in the right lower abdomen  which could be within the bowel. Air is seen in the distal  colon/rectal region. The stomach is gas-filled and distended.  Degenerative changes of the spine and S-shaped curvature of the  thoracolumbar spine is again noted. There has been interval removal of  the enteric sump.      Impression    IMPRESSION:  1. Since the prior study there has been oral contrast administration  which now appears to be largely in small and large bowel indicating  patency of the bowel.  2. Amorphous area of contrast in the right pelvis could be in the  bowel. Extravasation of contrast is difficult to exclude.  3. Degenerative changes of the spine are again noted.    CLIVE JACOB MD   CT Abdomen Pelvis w Contrast    Narrative    CT ABDOMEN PELVIS W CONTRAST 6/8/2020 8:44 AM    CLINICAL HISTORY: Abd pain, acute, generalized; SBO, worsening  abdominal pain. XR with possible extravasation of oral contrast    TECHNIQUE: CT scan of the abdomen and pelvis was performed following  injection of IV contrast. Multiplanar reformats were obtained. Dose  reduction techniques were used.  CONTRAST: 100mL Isovue-[id number]    COMPARISON: Radiograph dated 6/7/2020 at 1917 hours. CT dated  6/3/2020.    FINDINGS:   LOWER CHEST: Small pleural effusions with associated atelectasis.  There is high attenuation along the left ventricle.    HEPATOBILIARY: Cholelithiasis. No bile duct dilatation. The liver  contour is mildly nodular.    PANCREAS: Mildly atrophic.    SPLEEN:  Minimal amount of fluid adjacent to the spleen is unchanged.    ADRENAL GLANDS: Normal.    KIDNEYS/BLADDER: Simple cysts in the kidneys do not require follow-up.  There is a nonobstructing calculus in the left kidney. No  hydronephrosis or hydroureter. No ureteral calculi. A suprapubic  catheter is present.    BOWEL: The stomach is distended with fluid and air. Soft tissue  irregularity at the distal stomach is associated with mild adjacent  stranding an architectural distortion. There are no large fluid  collections or air locules in this region. Proximal jejunal loops  measure up to 5 cm in diameter and exhibit air-fluid levels. There is  a suspected transition point in the left upper quadrant (coronal  series 5 image 47, for example). Distal small bowel loops are normal  in caliber. There is oral contrast material in the colon. No  extraluminal contrast material identified.    PELVIC ORGANS: Hysterectomy. No adnexal masses.    ADDITIONAL FINDINGS: Moderate volume of ascites.    MUSCULOSKELETAL: Postsurgical changes along the ventral abdominal  wall. Degenerative changes in the spine. There is mild anasarca.      Impression    IMPRESSION:   1.  No extravasation of oral contrast material into the  intraperitoneal space.  2.  Suspect partial small bowel obstruction involving the proximal  jejunum. Suspect narrowing of the distal stomach related to prior  perforation in the region of prior intervention.  3.  Moderate volume of ascites. No free air.    LESTER J FAHRNER, MD   IR Gastro Jejunostomy Tube Placement    Narrative    GASTROSTOMY TUBE PLACEMENT 6/11/2020 12:09 PM    PROCEDURE:  An NG tube was placed in the stomach.  The stomach was  distended with air through the tube.  Ultrasound was used to joy the  inferior edge of the lobe of the liver.  From a subxiphoid subhepatic  approach, lidocaine was administered on the skin of the anterior  abdomen.  A short incision was made at this point.  Two T fasteners  were  placed in the body of the stomach in standard fashion.  A third  puncture was made into the body of the stomach and a wire directed  toward the gastropyloric junction. With some difficulty, able to  eventually navigate across the gastropyloric junction and into the  duodenum and jejunum. Stiff angled Glidewire was placed and after  serial dilatation, 16 Ecu[place]orean SEVERIANO gastrojejunostomy tube was placed.  Balloon was inflated and secured in position. Contrast was injected to  confirm appropriate position in both the gastric and jejunal lumens.  At completion, 200 mL of air was aspirated from the stomach for  decompression purposes. The gastric lumen will be placed to gravity  drainage bag.    Sedation: A moderate level of sedation was achieved with 2 mg of  midazolam                  100 mcg fentanyl    Sedation given by our nursing staff under my supervision.    Sedation time: 30 minutes    Fluoro time:  15.2 minutes  Air kerma: 102 mGy  Contrast: 75 mL of Omnipaque 240 administered into the enteric tract  without complication.  Local anesthetic:  20 mL of 1% lidocaine    FINDINGS: Three spot fluoroscopic images obtained demonstrating  appropriate position of the SEVERIANO gastrojejunostomy tube.      Impression    IMPRESSION: Successful placement of 16 Ecuadorean SEVREIANO gastrojejunostomy  tube. Gastric lumen to be used for gastric decompression, placed to  gravity drainage.    ANGY KRAMER MD   XR Abdomen 2 Views    Narrative    EXAM: XR ABDOMEN 2 VW  LOCATION: [facility]  DATE/TIME: 6/11/2020 12:15 AM    INDICATION: Increased abdominal pain. Small bowel obstruction.  COMPARISON: 06/08/2020      Impression    IMPRESSION: Enteric tube tip in proximal stomach. Contrast within colon. Upper normal colon. Small bowel dilatation on CT not well seen radiographically. Linear lucency overlying the liver. Uncertain if this is artifactual. Difficult to exclude   pneumatosis or portal venous gas radiographically. If further  characterization is indicated consider CT correlation. Degenerative change osseous structures.     *Note: Due to a large number of results and/or encounters for the requested time period, some results have not been displayed. A complete set of results can be found in Results Review.     \

## 2020-06-16 NOTE — PROGRESS NOTES
Home Infusion  Bhavani will be discharging today and going home on continuous enteral therapy.  I met with Bhavani at bedside and provided instruction in enteral administration using the infinity pump. I provided instruction on how to program the pump, set up and prime the tubing and load pump and bag into the backpack. Instructed in 8 hr hang time for formula and to use a new bag q 24 hrs.  Provided information about supplies and supply delivery, storage of formula, administration schedule and 24/7 availability of [facility] staff.  I reinforced water flushing for both patency and to maintain hydration.   Bhavani verbalized understanding of information given.  She was able to demonstrate and verbalize how to add formula to bag and start pump. She will have a nursing visit tomorrow for first bag and tubing change with [facility] RN.   Bhavani is ready for discharge from [facility] perspective.    Kimberly Carr RN  [facility] Liaison  [phone] (M-F 8a-5p)  [phone] Office

## 2020-06-16 NOTE — PLAN OF CARE
A&Ox4. VSS ex soft BP. Clear liquid diet. Up with lift/assist of two/lift. PICC removed. G/J tube in place, TF changed per infusion service. Suprapubic cath intact. Discharge instructions received, questions answered.

## 2020-06-16 NOTE — PROGRESS NOTES
Patient was seen for a complete reassessment yesterday -- see note dated 6/15/20 for details    Currently at goal TF as follows ~    Nutrition Support Enteral:  Type of Feeding Tube: G-J tube   Enteral Frequency:  Continuous  Enteral Regimen: Isosource 1.5 at 50 mL/hr   Total Enteral Provisions: 1800 kcal (26 kcal/kg), 82 g protein (1.2 g/kg), 211 g CHO, 18 g fiber, 912 mL H2O  Free Water Flush: 60 mL every 4 hours  Total fluid = 1272 mL (19 mL/kg)    No labs today   BGM < 180  Last BM 6/9  CL diet --> intake minimal (240 mL thus far, none recorded yesterday)  I/O 120/2120, weight = 87.1 kg     Noted plans for [facility] at discharge     Will increase flushes as recommended yesterday as oral intake minimal   240 mL every 6 hours = 960 mL  Total fluid = 1872 mL (27 mL/kg)    Julia Jang, RD, LD, CNSC   Clinical Dietitian - [facility]

## 2020-06-16 NOTE — PROGRESS NOTES
[facility]    Received referral for home tube feeds.  Benefits verified, covered at 100% as long need of >90 days of tube feeding is documented. I spoke to Largo to introduce home infusion services, review benefits and offer choice of providers. Largo would like to proceed with [facility] for tube feeding needs.     Please include home infusion referral as well as specific tube feeding regime (formula, rate, and free water flushes) on discharge orders. I will hook patient up to tube feeds and provide education at bedside prior to discharge home. Please allow 4 hours from when discharge orders are entered and signed to hospital delivery.     Thank you for the referral.    Kimberly Carr RN  [facility] Liaison  [phone] (Mon thru Fri 8am - 5pm)  [phone] Office

## 2020-06-16 NOTE — PROGRESS NOTES
Follow up visit today with patient. She states she is feeling much better today. Explains that she has a power wheelchair, hospital bed and grab bars at home. She has a home RN that provides catheter cares for her. Patient would be willing to have Home PT order placed at discharge. Patient is also anticipating having [facility] services for home enteral feedings, see their separate notes. Patient relays there are several family members available to help as needed. Patient will need a wheelchair transport arranged for discharge and is aware of the private cost for this service.     Address and PCP confirmed.    Case management will follow for finalization of discharge plan and help coordinate ride home on day of discharge.     [facility] Wheelchair transport arranged for today at 1630 to transport to patient home. I already confirmed address with patient, elevator present and family at home to let in the apartment. They know patient has own wheelchair. I called [phone] and spoke to and coordinated through [facility].

## 2020-06-16 NOTE — PROGRESS NOTES
Patient seen and examined, chart reviewed  Tolerating goal tube feeds  Continues to pass flatus but no reported bowel movement  Tolerating clear liquids without nausea  Reports no pain above what is her baseline    Afebrile with normal vital signs  Abdomen soft, nondistended and nontender GJ tube in place    Discussed with patient oral intake.  At this point I believe she can take things by mouth for the purposes of comfort or pleasure.  She is getting full nutrition through her tube feeds.  We have no further recommendations at this time.  Patient may be discharged from a surgery perspective.  Surgery will sign off.

## 2020-06-17 ENCOUNTER — TELEPHONE (OUTPATIENT)
Dept: FAMILY MEDICINE | Facility: CLINIC | Age: 65
End: 2020-06-17

## 2020-06-17 ENCOUNTER — PATIENT OUTREACH (OUTPATIENT)
Dept: CARE COORDINATION | Facility: CLINIC | Age: 65
End: 2020-06-17

## 2020-06-17 DIAGNOSIS — E11.21 TYPE 2 DIABETES MELLITUS WITH DIABETIC NEPHROPATHY (H): ICD-10-CM

## 2020-06-17 DIAGNOSIS — I10 HYPERTENSION GOAL BP (BLOOD PRESSURE) < 140/90: Primary | ICD-10-CM

## 2020-06-17 LAB
BACTERIA SPEC CULT: ABNORMAL
BACTERIA SPEC CULT: ABNORMAL
GLUCOSE BLDC GLUCOMTR-MCNC: 130 MG/DL (ref 70–99)
SPECIMEN SOURCE: ABNORMAL

## 2020-06-17 NOTE — PROGRESS NOTES
This is a recent snapshot of the patient's [facility] medical record.  For current drug dose and complete information and questions, call [phone] or In Basket pool, [facility] ([id number])  CSN Number:  [id number]

## 2020-06-17 NOTE — PROGRESS NOTES
Clinic Care Coordination Contact  [facility]/Voicemail       Clinical Data: Care Coordinator Outreach  Outreach attempted x 1.  Left message on patient's voicemail with call back information and requested return call.  Plan:Care Coordinator will try to reach patient again in 1-2 business days.

## 2020-06-18 ENCOUNTER — TELEPHONE (OUTPATIENT)
Dept: UROLOGY | Facility: CLINIC | Age: 65
End: 2020-06-18

## 2020-06-18 NOTE — TELEPHONE ENCOUNTER
[facility] Call Center    Phone Message    May a detailed message be left on voicemail: yes     Reason for Call: Order(s): Home Care Orders: Skilled Nursing: catheter change every 4-6 weeks and 2 times a month for 1 month.     Action Taken: Message routed to:  [facility] ([facility] urology     Travel Screening: Not Applicable

## 2020-06-18 NOTE — TELEPHONE ENCOUNTER
"Dr. Luis Manuel Lopez, please advise, okay to schedule for video visit for hospital follow up visit? Please route to MA/TC for scheduling. Also, patient may benefit from diabetes education. She has not yet started checking her blood glucose twice a day, as ordered in the hospital. She states she does have the supplies.     ED/Discharge Protocol    \"Hi, my name is Serg Kelley RN, a registered nurse, and I am calling on behalf of Dr. Luis Manuel Lopez's office at [place].  I am calling to follow up and see how things are going for you after your recent visit.\"    \"I see that you were in the (IP) on 6/3/20-6/16/20.    How are you doing now that you are home?\" Feeling better but tired    Is patient experiencing symptoms that may require a hospital visit?  no    Discharge Instructions    \"Let's review your discharge instructions.  What is/are the follow-up recommendations?  Pt. Response: follow up with PCP for visit, start checking blood sugar    \"Were you instructed to make a follow-up appointment?\"  Pt. Response: yes    \"When you see the provider, I would recommend that you bring your discharge instructions with you.    Medications    \"How many new medications are you on since your hospitalization/ED visit?\"    0-1  \"How many of your current medicines changed (dose, timing, name, etc.) while you were in the hospital/ED visit?\"   2 or more - Epic MTM referral needed  \"Do you have questions about your medications?\"   No   \"Were you newly diagnosed with heart failure, COPD, diabetes or did you have a heart attack?\"   No  For patients on insulin: \"Did you start on insulin in the hospital or did you have your insulin dose changed?\"   No; however, patient states she has not started checking her BG BID yet. She does report she has the supplies  Post Discharge Medication Reconciliation Status: discharge medications reconciled, continue medications without change.    Was MTM referral placed (*Make sure to put transitions as " "reason for referral)?   Yes - \"The Medication Therapy  will call you within the next few days to schedule an appointment with an MTM pharmacist to discuss your medicines and make sure they are working as well as they possibly can for you. This visit can be done in a [facility] clinic or on the phone and is at no charge to you.\"    Call Summary    \"Do you have any questions or concerns about your condition or care plan at the moment?\"    No  Triage nurse advice given: Advised patient it may be worth considering to check blood pressure occasionally, as 3 blood pressure meds were discontinued during hospitalization.     Patient was in ER 5 in the past year (assess appropriateness of ER visits.)      \"If you have questions or things don't continue to improve, we encourage you contact us through the main clinic number,  [phone].  Even if the clinic is not open, triage nurses are available 24/7 to help you.     We would like you to know that our clinic has extended hours (provide information).  We also have urgent care (provide details on closest location and hours/contact info)\"      \"Thank you for your time and take care!\"    Allie Ulrich, BSN, RN, PHN        "

## 2020-06-19 NOTE — TELEPHONE ENCOUNTER
It's fine. The only thing I would say different is catheter change every 4 weeks. Not 4-6 weeks. Do you mind calling her and letting her know?     Kennedi Wynn RNCC

## 2020-06-19 NOTE — PROGRESS NOTES
Clinic Care Coordination Contact  [facility]/Voicemail       Clinical Data: Care Coordinator Outreach  Outreach attempted x 2.  Left message on patient's voicemail with call back information and requested return call.  Plan: Care Coordinator will try to reach patient again in 3-5 business days.

## 2020-06-19 NOTE — TELEPHONE ENCOUNTER
Neva from [facility] was notified that it is ok to renew the patient catheter change every 4--6 weeks. Neva was notified that we would like for the patient to have them done every 4 weeks. Neva states that the patient wants it done every 4-6 weeks. Previously ok'joao Hollis MA

## 2020-06-19 NOTE — TELEPHONE ENCOUNTER
IP / Discharge Outreach Protocol    Patient Contact    Attempt # 2 (final attempt)     Was call answered?  No.  Left message on voicemail with information to call me back. [phone] or [phone]. Ask to talk with any RN.   Does not appear checking MyChart consistently. Let's remind her to log in.  Sumit Laws, RN

## 2020-06-22 ENCOUNTER — TELEPHONE (OUTPATIENT)
Dept: FAMILY MEDICINE | Facility: CLINIC | Age: 65
End: 2020-06-22

## 2020-06-22 NOTE — TELEPHONE ENCOUNTER
MTM referral from: [facility] visit (referral by provider)    MTM referral outreach attempt #2 on June 22, 2020 at 12:07 PM      Outcome: Patient not reachable after several attempts, will route to MTM Pharmacist/Provider as an FYI. Thank you for the referral.    Shaista Hernandez, MTM Coordinator

## 2020-06-25 NOTE — TELEPHONE ENCOUNTER
Late entry. Patient has since scheduled   Next 5 appointments (look out 90 days)    Jul 02, 2020  3:00 PM CDT  (Arrive by 2:40 PM)  Office Visit with Skylar Moore MD  [facility] [address] [place] [id number] [phone]   Jul 07, 2020  2:30 PM CDT  [facility] Visit with [facility] MD  [facility] [address], [address]  [facility] [id number] [phone]        Sumit Laws RN

## 2020-06-26 ENCOUNTER — TELEPHONE (OUTPATIENT)
Dept: SURGERY | Facility: CLINIC | Age: 65
End: 2020-06-26

## 2020-06-26 DIAGNOSIS — K56.609 SMALL BOWEL OBSTRUCTION (H): Primary | ICD-10-CM

## 2020-06-26 NOTE — TELEPHONE ENCOUNTER
Patient called back.   She can have the feeding tube removed at   Interventional Radiology, but they need an order from doctor before they can schedule her.    She would like this to get done asap!    Phone number for Interventional Radiology is: [phone] or [phone]    Patient can be reached at:[phone]

## 2020-06-26 NOTE — TELEPHONE ENCOUNTER
Name of caller: Patient    Reason for Call:  Possible symptoms    Patient has a SBO and a feeding tube. The feeding tube seems to be getting very uncomfortable. Wondering if it needs to come out. Patient does have an OV scheduled with FRANCISCO 7/7, but she thinks it might need to come out sooner?    Please call: [phone]  OK to leave VM

## 2020-06-26 NOTE — TELEPHONE ENCOUNTER
Patient recently discharged from hospital after being inpatient for small bowel obstruction.  During hospital stay, interventional radiology placed a gastrojejunostoy tube for venting/feeding.    She reports that she wants the tube removed.  It is clogged. She reports it can't be flushed. She has started eating solid foods and is having bowel movements.  Diet at discharge was recommended to be clear to full liquids, taking orals for comfort/pleasure.    Per her discharge instructions, she should contact interventional radiology if she has any issues with the feeding tube.    Contact number for IR provided to patient.    She will call [facility]. Follow up scheduled with Dr. Lopez 7/7/20.    Deb Glover RN-BSN

## 2020-06-26 NOTE — TELEPHONE ENCOUNTER
Called patient to inform her that Dr. Lopez is out of the office until Tuesday.    She is wondering if someone else can put the order in for feeding tube removal. Informed her that because it is not emergent in nature, Dr. Lopez should be the one to place the order.    Will send Dr. Lopez a message, but did inform patient that he is not back in the office until Tuesday.    She reports that they can get her in on Monday for removal.      Again, reiterated that message will be sent to Dr. Lopez, but there is no guarantee that he will respond while he is out of the office.    She verbalized understanding.    Deb Glover, RN-BSN

## 2020-06-29 NOTE — PROGRESS NOTES
Clinic Care Coordination Contact  [facility]/Voicemail       Clinical Data: Care Coordinator Outreach  Outreach attempted x 3.  Left message on patient's voicemail with call back information and requested return call.  Plan:. Care Coordinator will try to reach patient again in 3-7 business days.

## 2020-06-29 NOTE — TELEPHONE ENCOUNTER
Called patient and informed her that Dr. Lopez says tube can not be removed at this time.  It needs to remain in place for 5-6 weeks to avoid perforation.  If clogged, order can be placed for tube to be replaced.    Patient unhappy with this information, but would like to have tube be replaced, then, as it is causing discomfort.    Will inform Dr. Lopez.    Deb Glover RN-BSN

## 2020-06-29 NOTE — TELEPHONE ENCOUNTER
Patient called back. Wondering if there is any way [facility] could put this order in today? She really wants to get the feeding tube out today.    Please call if able to do so.    [phone]  OK to leave VM

## 2020-06-30 ENCOUNTER — HOSPITAL ENCOUNTER (OUTPATIENT)
Facility: CLINIC | Age: 65
Discharge: HOME | End: 2020-06-30
Attending: RADIOLOGY | Admitting: RADIOLOGY
Payer: MEDICARE

## 2020-06-30 ENCOUNTER — APPOINTMENT (OUTPATIENT)
Dept: INTERVENTIONAL RADIOLOGY/VASCULAR | Facility: CLINIC | Age: 65
End: 2020-06-30
Attending: SURGERY
Payer: MEDICARE

## 2020-06-30 VITALS
OXYGEN SATURATION: 98 % | RESPIRATION RATE: 16 BRPM | TEMPERATURE: 98 F | HEART RATE: 66 BPM | DIASTOLIC BLOOD PRESSURE: 66 MMHG | SYSTOLIC BLOOD PRESSURE: 130 MMHG

## 2020-06-30 DIAGNOSIS — N31.9 NEUROGENIC BLADDER: Primary | ICD-10-CM

## 2020-06-30 DIAGNOSIS — K56.609 SMALL BOWEL OBSTRUCTION (H): ICD-10-CM

## 2020-06-30 PROCEDURE — 27210742 ZZH CATH CR1

## 2020-06-30 PROCEDURE — 40000863 ZZH STATISTIC RADIOLOGY XRAY, US, CT, MAR, NM

## 2020-06-30 PROCEDURE — C1769 GUIDE WIRE: HCPCS

## 2020-06-30 PROCEDURE — 27210814 ZZ H TUBE GASTRO CR12

## 2020-06-30 PROCEDURE — 49450 REPLACE G/C TUBE PERC: CPT

## 2020-06-30 PROCEDURE — 27210915 ZZ H TUBE GASTRO CR4

## 2020-06-30 RX ORDER — OXYBUTYNIN CHLORIDE 15 MG/1
15 TABLET, EXTENDED RELEASE ORAL DAILY
Qty: 90 TABLET | Refills: 3 | Status: SHIPPED | OUTPATIENT
Start: 2020-06-30 | End: 2021-07-01

## 2020-06-30 NOTE — PROGRESS NOTES
Care Suites Post Procedure Note    Patient Information  Name: Bhavani White  Age: 64 year old    Post Procedure  Time patient returned to Care Suites: 1310  Concerns/abnormal assessment: none.   If abnormal assessment, provider notified: N/A  Plan/Other: discharge to     Dominique Hadley RN

## 2020-06-30 NOTE — PRE-PROCEDURE
GENERAL PRE-PROCEDURE:   Procedure:  Gastro jejunostomy (GJ) tube exchange for a plugged GJ tube  Date/Time:  6/30/2020 10:58 AM    Risks and benefits: Risks, benefits and alternatives were discussed    Patient states understanding of procedure being performed: Yes    Patient's understanding of procedure matches consent: Yes    Procedure consent matches procedure scheduled: Yes    Appropriately NPO:  Yes

## 2020-06-30 NOTE — SEDATION DOCUMENTATION
Interventional Radiology Intra-procedural Nursing Note    Patient Name: Bhavani White  Medical Record Number: [id number]  Today's Date: June 30, 2020    Start Time: 1250  End of procedure time: 1305  Procedure: Gastrojejunostomy tube exchange  Report given to: Care Suites  Time pt departs:  1315  1250 - Pt to IR 1 via cart. Pt moved to exam table in a supine position.  Pt prepped with chloraprep and draped in a sterile fashion. 1255 - Dr Ace into begin procedure.  1307 - Gtube placed instead of GJ Tube.  Pt tolerated procedure well.   Other Notes:     Mattie Johnson RN

## 2020-06-30 NOTE — DISCHARGE INSTRUCTIONS
G-tube Exchange Discharge Instructions     After you go home:      You may resume your normal diet    Care of Insertion Site:      For the first 48 hrs, check your puncture site every couple hours while you are awake     You may remove/change the dressing tomorrow    You may shower tomorrow    No tub baths, whirlpools or swimming until your puncture site has fully healed     Activity       You may go back to normal activity in 24 hours    Wait 48 hours before lifting, straining, exercise or other strenuous activity    Medicines:      You may resume all medications    Resume your Warfarin/Coumadin at your regular dose today. Follow up with your provider to have your INR rechecked    Resume your Platelet Inhibitors and Aspirin tomorrow at your regular dose    For minor pain, you may take Acetaminophen (Tylenol) or Ibuprofen (Advil)                 Call the provider who ordered this procedure if:      Blood or fluid is draining from the site    The site is red, swollen, hot, tender or there is foul-smelling drainage    Chills or a fever greater than 101 F (38 C)    Increased pain at the site    Any questions or concerns    Call  911 or go to the Emergency Room if:      Severe pain or trouble breathing    Bleeding that you cannot control      If you have questions call:          [facility] Radiology Dept @ [phone]          Caring for your G-tube    Tube Maintenance:    For ENFit Tubes:      Do NOT over tighten the connections (the purple end & hub connection).      Clean the connecting ends (especially the hub) every day.      If you are unable to disconnect the tubing ends, soak the connection in warm water (or wrap in a wet warm washcloth) to try and loosen the connection.    Possible problems with your tube may include:      Clogged with medications or feedings - most obstructions can be cleared with a small (3cc) syringe and warm water. This may be repeated until the tube is unclogged. This can be  prevented by frequently flushing the tube with water (60cc) during the day and always after medications & feedings.      Tube pulls out or falls out -cover the opening with gauze & tape. Call [phone] for further instructions      Skin breakdown and/or yeast infections at the insertion site - use of skin barrier ointments and anti-fungal powders can treat most site irritations.  Ask your pharmacist or provider for assistance (a prescription is not necessary).    In general, tube problems (including pulled tubes) are NOT emergency situations. Unless the pulling out of a tube is accompanied by uncontrollable bleeding, please DO NOT GO TO THE EMERGENCY ROOM!  Call [phone] with problems.    Tube Care:      Change the gauze dressing every 24 hours and if soiled (dirty).  Stabilize all tubes securely by using gauze and tape.  Clean tube site with soap & water using a cotton applicator (Q-tip) as needed to prevent irritation.    Flush feeding tube with 60cc of warm or room temperature water before and after meds.  To prevent the tube from clogging, ask your provider or pharmacist if liquid forms of your medications are available. If not, crush the pills well & be sure to flush the tube before & after all medications.    Flush feeding tube a minimum of every 4 hours and when feeding is completed with 60cc of water to keep the tube clear and avoid clogging.    Pt may use an abdominal (waist) binder to protect the G-tube.    If there is continued oozing or bleeding, redness, yellow/green/foul smelling drainage    STOP the feedings & use of the tube immediately if there is:      Continued oozing or bleeding at the site    Redness    Yellow/green or foul smelling drainage at the site    Uncontrolled stomach pain    Many of the supplies mentioned above can be purchased at your local pharmacy      For issues with your tube, please call:    [place] [facility] Dept at [phone]

## 2020-06-30 NOTE — PROGRESS NOTES
Care Suites Admission Nursing Note    Patient Information  Name: Bhavani White  Age: 64 year old  Reason for admission: g tube change   Care Suites arrival time: 1100    Patient Admission/Assessment   Pre-procedure assessment complete: Yes  If abnormal assessment/labs, provider notified: N/A  NPO: Yes  Medications held per instructions/orders: N/A  Consent: obtained  If applicable, pregnancy test status: declined  Patient oriented to room: Yes  Education/questions answered: Yes  Plan/other: proceed    Discharge Planning  Accompanied by:  Sergio  Discharge name/phone number: [phone]  Overnight post sedation caregiver:salvador  Discharge location: home    Khurram Hennessy RN         PATIENT/VISITOR WELLNESS SCREENING    Step 1 Patient Screening    1. In the last month, have you been in contact with someone who was confirmed or suspected to have Coronavirus/COVID-19? No    2. Do you have the following symptoms?  Fever/Chills? No   Cough? No   Shortness of breath? No   New loss of taste or smell? No  Sore throat? No  Muscle or body aches? No  Headaches? No  Fatigue? No  Vomiting or diarrhea? No      If the visitor has positive symptoms, notify supervisor/manger  Per policy, the visitor will need to leave the facility     Step 3 Refer to logic grid below for actions    NO SYMPTOM(S)    ACTIONS:  1. Standard rooming process  2. Provider to assess per normal protocol  3. Implement precautions as needed and per guidelines     POSITIVE SYMPTOM(S)  If positive for ANY of the following symptoms: fever, cough, shortness of breath, rash    ACTION:  1. Continue to have the patient wear a mask   2. Room patient as soon as possible  3. Don appropriate PPE when entering room  4. Provider evaluation

## 2020-06-30 NOTE — PROGRESS NOTES
Care Suites Discharge Nursing Note    Patient Information  Name: Bhavani White  Age: 64 year old    Discharge Education:  Discharge instructions reviewed: Yes  Additional education/resources provided: na  Patient/patient representative verbalizes understanding: Yes  Patient discharging on new medications: N/A  Medication education completed: N/A    Discharge Plans:   Discharge location: home  Discharge ride contacted: Yes  Approximate discharge time: 1325    Discharge Criteria:  Discharge criteria met and vital signs stable: Yes    Patient Belongs:  Patient belongings returned to patient: Yes    Dominique Hadley RN

## 2020-07-01 ENCOUNTER — HOME INFUSION (PRE-WILLOW HOME INFUSION) (OUTPATIENT)
Dept: PHARMACY | Facility: CLINIC | Age: 65
End: 2020-07-01

## 2020-07-02 NOTE — PROGRESS NOTES
This is a recent snapshot of the patient's [facility] medical record.  For current drug dose and complete information and questions, call [phone] or In Basket pool, fv home infusion ([id number])  CSN Number:  [id number]

## 2020-07-03 ENCOUNTER — HOME INFUSION (PRE-WILLOW HOME INFUSION) (OUTPATIENT)
Dept: PHARMACY | Facility: CLINIC | Age: 65
End: 2020-07-03

## 2020-07-06 NOTE — PROGRESS NOTES
This is a recent snapshot of the patient's [facility] medical record.  For current drug dose and complete information and questions, call [phone] or In Basket pool, fv home infusion ([id number])  CSN Number:  [id number]

## 2020-07-07 NOTE — PROGRESS NOTES
Clinic Care Coordination Contact  Community Health Worker Initial Outreach            Patient accepts CC: Yes. Patient scheduled for assessment with RN  on 7/14 at 2:00. Patient noted desire to discuss BPCIA.

## 2020-07-08 ENCOUNTER — MEDICAL CORRESPONDENCE (OUTPATIENT)
Dept: HEALTH INFORMATION MANAGEMENT | Facility: CLINIC | Age: 65
End: 2020-07-08

## 2020-07-09 ENCOUNTER — TELEPHONE (OUTPATIENT)
Dept: FAMILY MEDICINE | Facility: CLINIC | Age: 65
End: 2020-07-09

## 2020-07-09 NOTE — TELEPHONE ENCOUNTER
Homecare Orders Requested:     Victoria at Phone Number [phone] from  Homecare Agency called to request orders for the following Services:     for:home making assistance, pt quite debilitated and in wheelchair,  has walker, would like help with housekeeping     Verbal orders provided--Homecare agency to fax orders over for review and signature of PCP.    FYI to PITO Mccarthy RN

## 2020-07-14 ENCOUNTER — PATIENT OUTREACH (OUTPATIENT)
Dept: CARE COORDINATION | Facility: CLINIC | Age: 65
End: 2020-07-14

## 2020-07-14 ASSESSMENT — ACTIVITIES OF DAILY LIVING (ADL): DEPENDENT_IADLS:: CLEANING

## 2020-07-14 NOTE — PROGRESS NOTES
"Clinic Care Coordination Contact    Clinic Care Coordination Contact  OUTREACH    Referral Information:  Referral Source: IP Handoff (BPCI-A)    Primary Diagnosis: GI Disorders (SBO)      Patient was hospitalized at [facility] from 6/3/20 to 6/16/20 with a proximal SBO. Patient has a history of multiple abdominal surgeries and infections following emergent repair of perforated duodenal ulcer 12/2019.       Pt states she feels she's getting stronger every day.    Has an appt to meet with the Home Care SW tomorrow, 7/15/20, to discuss housekeeping options and other community resources she may qualify for.  Pt is w/c bound and needs help with housekeeping.    Has a GJ tube but not currently using it.  Will have it removed next week at surgery f/u appt on 7/21/20.  Denies N/V, abdominal pain.  Eating/drinking without any problems.      Pt states she's not had a BM \"in a while, so I'm stepping up my bowel program, taking Miralax bid, in addition to Sennakot S/Ambar-Colace 200 mg bid.\"  She also has dulcolax suppositories she takes PRN, but hasn't taken that yet.    Pt has diabetes and takes metformin twice a day with meals.  States she has a glucometer at home but doesn't check her BS's, relying on her A1c for how she's doing with her BS.          Chief Complaint   Patient presents with     Clinic Care Coordination - Initial     BPCI-A        [place] Utilization:   Clinic Utilization  Difficulty keeping appointments:: No  Compliance Concerns: No  No-Show Concerns: No  No PCP office visit in Past Year: No  Utilization    Last refreshed: 7/14/2020  3:02 PM:  Hospital Admissions 5           Last refreshed: 7/14/2020  3:02 PM:  ED Visits 5           Last refreshed: 7/14/2020  3:02 PM:  No Show Count (past year) 1              Current as of: 7/14/2020  3:02 PM            Clinical Concerns:  Current Medical Concerns:   SBO    Patient Active Problem List   Diagnosis     Chronic low back pain     Moderate major " "depression (H)     Anxiety     DM type 2, w Neuropathy -- Hgb A1C 7.1 on 1/7/20     Hypothyroidism due to acquired atrophy of thyroid     Lumbar spinal stenosis     Fibromyalgia, Chronic Pain -- On Methadone     Osteoarthritis     Hypertension goal BP (blood pressure) < 140/90     Health Care Home     Neuropathy     Hyperlipidemia LDL goal <100     Recurrent UTI (urinary tract infection)     Controlled substance agreement signed 10/24/2014     Wheelchair bound     Elevated BMI     Asthma     Personal history of methicillin resistant Staphylococcus aureus     Fracture of lower extremity     Neurogenic bladder, S/P Suprapubic Catheter      Ventral hernia     Osteoarthritis of cervical spine with myelopathy     DISH (diffuse idiopathic skeletal hyperostosis)     Morbid obesity -- BMI 40.0     Perforated duodenal ulcer -- S/P Surgery 1/1/2020     NSTEMI (non-ST elevated myocardial infarction) (H)     Fusion of spine of cervical region     ANIYA -- noncompliant with CPAP     Abdominopelvic abscess (H)     History of MRSA infection     Sepsis (WBC 20K, )     Abdominal wall cellulitis     SBO (small bowel obstruction) (H)          Current Behavioral Concerns:   Has depression and anxiety, feels sx are well controlled on her duloxetine.    Education Provided to patient:  Educated patient on 90 day post hospitalization transition monitoring, per BPCI-A standards.       Pain  Pain (GOAL):: Yes  Type: Chronic (>3mo)  Location of chronic pain:: lower back pain, hands and feet biliaterally due to neuropathy  Radiating: Yes  Location pain radiates to: low back radiates down her legs, bilaterally  Progression: Constant  Description of pain: Sharp, Aching, Throbbing  Chronic pain severity:: 7(States she \"lives at a 5/10 for pain,\" but right now pain is 7/10)  Limitation of routine activities due to chronic pain:: Yes  Description: Able to do most things most days with some rest  Alleviating Factors: Pain Medication, Rest, " Stretching  Aggravating Factors: Activity, Positioning  Health Maintenance Reviewed: Due/Overdue (Has an upcoming appt with PCP 7/20/20, will discuss at that time)    Medication Management:  Manages medications on her own, independently    Functional Status:  Dependent ADLs:: Wheelchair-independent  Dependent IADLs:: Cleaning  Bed or wheelchair confined:: Yes (Wheelchair bound)  Mobility Status: Independent w/Device  Fallen 2 or more times in the past year?: No  Any fall with injury in the past year?: No    Living Situation:  Current living arrangement:: I live in a private home with spouse (, Sergio)  Type of residence:: Apartment - handicap accessible    Lifestyle & Psychosocial Needs:  Lifestyle     Physical activity     Days per week: 7 days     Minutes per session: 20 min     Stress: To some extent     Social Needs     Financial resource strain: Not hard at all     Food insecurity     Worry: Never true     Inability: Never true     Transportation needs     Medical: No     Non-medical: No     Diet:: Diabetic diet  Inadequate nutrition (GOAL):: No  Tube Feeding: No(Pt has a GJ tube, but currently not using it; scheduled to have it removed 7/21/20.)  Inadequate activity/exercise (GOAL):: No(Pt is w/c bound, but does active ROM and stretching exercises several times a day in addition to IADL's.)  Significant changes in sleep pattern (GOAL): No  Transportation means:: Regular car     Bahai or spiritual beliefs that impact treatment:: No(Confucianism)  Mental health DX:: Yes(Depression, anxiety)  Mental health DX how managed:: Medication  Mental health management concern (GOAL):: No  Informal Support system:: Spouse, Family(, siblings live nearby)   Socioeconomic History     Marital status:      Spouse name: Not on file     Number of children: Not on file     Years of education: Not on file     Highest education level: Bachelor's degree (e.g., BA, AB, BS)   Relationships     Social connections      Talks on phone: Twice a week     Gets together: Never     Attends Christian service: Never     Active member of club or organization: No     Attends meetings of clubs or organizations: Never     Relationship status:      Intimate partner violence     Fear of current or ex partner: No     Emotionally abused: No     Physically abused: No     Forced sexual activity: No     Tobacco Use     Smoking status: Never Smoker     Smokeless tobacco: Never Used   Substance and Sexual Activity     Alcohol use: No     Frequency: Never     Drinks per session: Patient refused     Binge frequency: Patient refused     Drug use: Yes     Types: Marijuana     Comment: Medical canibis, methadone for chronic back pain     Sexual activity: Not Currently        Resources and Interventions:  Current Resources:   List of home care services:: Skilled Nursing([facility] monthly for check in and recertification (every 2 months) and every 6 weeks to change her suprapubic catheter.)  Community Resources: Home Care  Supplies used at home:: Diabetic Supplies  Equipment Currently Used at Home: wheelchair, power, other (see comments), glucometer, shower chair, grab bar, toilet, grab bar, tub/shower, raised toilet(Suprapubic catheter)    Advance Care Plan/Directive  Advanced Care Plans/Directives on file:: Yes  Type Advanced Care Plans/Directives: Advanced Directive - On File    Referrals Placed: None     Goals:   Goals        General    Reducing Risks (pt-stated)     Notes - Note edited  7/14/2020  2:59 PM by Anabelle Beth RN    Goal Statement:  I want to stay out of the hospital for the next 90 days.  Date Goal set:  7/14/2020  Barriers:  Recent hospitalization, multiple abdominal infections/surgeries  Strengths:  Able to identify and advocate for needs, access to home care  Date to Achieve By:  9/16/2020 (based on hospital discharge date of 6/16/20)  Patient expressed understanding of goal:  Yes  Action steps to achieve this goal:  1. I will  attend all my appts as scheduled, and recommended follow up appts  2. I will call my clinic if I start to feel sick or notice any change(s) in my health, and schedule an appt if needed  3. I will call the triage nurse line for advice, before going to the hospital  4. I will go to  or Walk-In-Clinic before going to the hospital, if I am unable to get an appt with my PCP  5. I will update the Community Healthcare Worker during outreach calls and ask for additional support or resources, if needed              Patient/Caregiver understanding:  Pt verbalized understanding of information discussed and agrees with plan       Future Appointments              In 6 days Skylar Moore MD [facility]    In 1 week Ayan Lopez MD [facility]    In 1 month Anabelle Beth, RN [place] [facility] Care Coordination          Plan:      See above goal(s) and action steps    Per BPCI-A guidelines, Care Coordination (CC) will monitor patient for 90 days post hospitalization    Community Healthcare Worker will follow up per BPCI-A standard outreach    Next RN Care Coordinator outreach in one month          Anabelle Beth RN Clinic Care Coordination

## 2020-07-14 NOTE — LETTER
[place] CARE COORDINATION  [facility]  [address] [place] [address]        July 14, 2020      Bhavani White  [address]  [facility] [address]      Dear Bhavani,    I am a clinic care coordinator who works with Skylar Moore MD at [facility].  I wanted to thank you for spending the time to talk with me.  Below is a description of clinic care coordination and how I can further assist you.      The clinic care coordination team is made up of a registered nurse,  and community health worker who understand the health care system. The goal of clinic care coordination is to help you manage your health and improve access to the health care system in the most efficient manner. The team can assist you in meeting your health care goals by providing education, coordinating services, strengthening the communication among your providers and supporting you with any resource needs.    Please feel free to contact the Community Health Worker, Rosana, at [phone] with any questions or concerns. We are focused on providing you with the highest-quality healthcare experience possible and that all starts with you.     Sincerely,     Anabelle Beth RN Clinic Care Coordination     Enclosed: I have enclosed a copy of the Complex Care Plan. This has helpful information and goals that we have talked about. Please keep this in an easy to access place to use as needed.

## 2020-07-14 NOTE — LETTER
[facility]  Complex Care Plan  About Me:    Patient Name:  Bhavani White    YOB: 1955  Age:         64 year old   [place] MRN:    [id number] Telephone Information:  Home Phone [phone]   Mobile [phone]       Address:  [address]  [address]  [facility] [address] Email address:  [email or web address]      Emergency Contact(s)    Name Relationship Lgl Grd Work Phone Home Phone Mobile Phone   1. SERGIO WHITE Spouse No [phone]   2. JOHNSON MARYLE* Sister No   [phone]   3. GIL MIRANDA Brother No [phone]     4. SUNITHA WHITE Relative    [phone]   5. REECE WHITE * Relative No   [phone]           Primary language:  English     needed? No   [place] [facility]:  [phone] op. 1  Other communication barriers: Glasses  Preferred Method of Communication:  Mail  Current living arrangement: I live in a handicap accessible apartment with my spouse (, Sergio)  Mobility Status/ Medical Equipment: Independent w/Device    Health Maintenance: Due/Overdue (Has upcoming appt with PCP 7/20/20, will discuss at that time)    My Access Plan  Medical Emergency 911   Primary Clinic Line [facility] - [phone]   24 Hour Appointment Line [phone] or  [phone]) (toll-free)   24 Hour Nurse Line [phone] (toll-free)   Preferred Urgent Care [facility], [phone]   Preferred Hospital [facility]  [phone]   Preferred Pharmacy [id number] - [place], [place] - [address] AT [place] [address]     Behavioral Health Crisis Line The National Suicide Prevention Lifeline at 1-802.404.8284 or 911     My Care Team Members  Patient Care Team       Relationship Specialty Notifications Start End    Skylar Moore MD PCP - General Family Practice  2/18/20     Phone: [phone] Fax:  [phone]         [address] [facility] [address]    Rakel Ramírez MD MD Urology  10/21/15     Phone: [phone] Fax: [phone]         [address] [facility] [address] [facility] [address]    Yoandy Rios MD MD Urology  6/19/17     Phone: [phone] Fax: [phone]         [address] [place] [address]    Ira Suárez, RN Clinic Care Coordinator Urology Abnormal results only, Admissions 8/1/17     Phone: [phone] Pager: [phone]        Jaki Dimas RN Clinic Care Coordinator Urology Abnormal results only, Admissions 2/14/18     Phone: [phone]         Skylar Moore MD Assigned PCP   11/3/19     Phone: [phone] Fax: [phone] [address] [facility] [address]    [facility] [place] [facility]  5/5/20     Phone: [phone]         Rosana Decker MA Community Health Worker Primary Care - CC Admissions 6/9/20     BPCI-A    Anabelle Beth, RN Lead Care Coordinator Primary Care - CC Admissions 7/7/20     BPCI-A    Phone: [phone]                 My Care Plans  Self Management and Treatment Plan  Goals and (Comments)  Goals        General    Reducing Risks (pt-stated)     Notes - Note edited  7/14/2020  2:59 PM by Anabelle Beth, RN    Goal Statement:  I want to stay out of the hospital for the next 90 days.  Date Goal set:  7/14/2020  Barriers:  Recent hospitalization, multiple abdominal infections/surgeries  Strengths:  Able to identify and advocate for needs, access to home care  Date to Achieve By:  9/16/2020 (based on hospital discharge date of 6/16/20)  Patient expressed understanding of goal:  Yes  Action steps to achieve this goal:  1. I will attend all my appts as scheduled, and recommended follow up appts  2. I will call my clinic if I start to feel sick or notice any change(s) in my health, and schedule an appt if needed  3. I will call the triage nurse line for  advice, before going to the hospital  4. I will go to  or Walk-In-Clinic before going to the hospital, if I am unable to get an appt with my PCP  5. I will update the Community Healthcare Worker during outreach calls and ask for additional support or resources, if needed               Action Plans on File:   Asthma    Depression     Advance Care Plans/Directives Type:   Type Advanced Care Plans/Directives: Advanced Directive - On File      My Medical and Care Information  Problem List   Patient Active Problem List   Diagnosis     Chronic low back pain     Moderate major depression (H)     Anxiety     DM type 2, w Neuropathy -- Hgb A1C 7.1 on 1/7/20     Hypothyroidism due to acquired atrophy of thyroid     Lumbar spinal stenosis     Fibromyalgia, Chronic Pain -- On Methadone     Osteoarthritis     Hypertension goal BP (blood pressure) < 140/90     Health Care Home     Neuropathy     Hyperlipidemia LDL goal <100     Recurrent UTI (urinary tract infection)     Controlled substance agreement signed 10/24/2014     Wheelchair bound     Elevated BMI     Asthma     Personal history of methicillin resistant Staphylococcus aureus     Fracture of lower extremity     Neurogenic bladder, S/P Suprapubic Catheter      Ventral hernia     Osteoarthritis of cervical spine with myelopathy     DISH (diffuse idiopathic skeletal hyperostosis)     Morbid obesity -- BMI 40.0     Perforated duodenal ulcer -- S/P Surgery 1/1/2020     NSTEMI (non-ST elevated myocardial infarction) (H)     Fusion of spine of cervical region     ANIYA -- noncompliant with CPAP     Abdominopelvic abscess (H)     History of MRSA infection     Sepsis (WBC 20K, )     Abdominal wall cellulitis     SBO (small bowel obstruction) (H)        Current Medications and Allergies:   Current Outpatient Medications   Medication Sig Dispense Refill     bisacodyl (DULCOLAX) 10 MG suppository Place 10 mg rectally 2 times daily as needed for constipation       DULoxetine  (CYMBALTA) 60 MG capsule Take 120 mg by mouth every morning       gabapentin (NEURONTIN) 800 MG tablet Take 1,600 mg by mouth 3 times daily 0800, 1200 and 1800       levothyroxine (SYNTHROID/LEVOTHROID) 88 MCG tablet TAKE 1 TABLET(88 MCG) BY MOUTH DAILY (Patient taking differently: Take 88 mcg by mouth every morning (before breakfast) ) 90 tablet 0     metFORMIN (GLUCOPHAGE-XR) 500 MG 24 hr tablet Take 500 mg by mouth 2 times daily (with meals)        methadone (DOLOPHINE) 10 MG tablet Take 2 tablets (20 mg) by mouth 2 times daily 20 tablet 0     miconazole (MICATIN/MICRO GUARD) 2 % external powder Apply topically 2 times daily as needed for other (topical candidiasis)  0     oxybutynin ER (DITROPAN XL) 15 MG 24 hr tablet Take 1 tablet (15 mg) by mouth daily 90 tablet 3     pantoprazole (PROTONIX) 40 MG EC tablet Take 1 tablet (40 mg) by mouth daily 90 tablet 1     polyethylene glycol (MIRALAX/GLYCOLAX) packet Take 17 g by mouth 2 times daily as needed (constipation)  0     rosuvastatin (CRESTOR) 5 MG tablet Take 1 tablet (5 mg) by mouth At Bedtime 90 tablet 1     senna-docusate (SENOKOT-S/PERICOLACE) 8.6-50 MG tablet Take 2 tablets by mouth 2 times daily as needed for constipation 100 tablet 0     tiZANidine (ZANAFLEX) 4 MG tablet Take 4 mg by mouth 3 times daily as needed for muscle spasms        traZODone (DESYREL) 100 MG tablet Take 100 mg by mouth At Bedtime       hydrOXYzine (ATARAX) 50 MG tablet 50 mg 2 times daily as needed        ipratropium - albuterol 0.5 mg/2.5 mg/3 mL (DUONEB) 0.5-2.5 (3) MG/3ML neb solution Take 1 vial by nebulization every 6 hours as needed for shortness of breath / dyspnea or wheezing       ondansetron (ZOFRAN-ODT) 4 MG ODT tab Take 1 tablet (4 mg) by mouth every 6 hours as needed for nausea or vomiting       VENTOLIN  (90 Base) MCG/ACT inhaler INHALE 2 PUFFS INTO THE LUNGS EVERY 6 HOURS AS NEEDED FOR SHORTNESS OF BREATH (Patient taking differently: Inhale 2 puffs into the  lungs every 6 hours as needed for shortness of breath / dyspnea ) 18 g 0          Care Coordination Start Date: No linked episodes   Frequency of Care Coordination:     Form Last Updated: 07/14/2020

## 2020-07-21 ENCOUNTER — OFFICE VISIT (OUTPATIENT)
Dept: SURGERY | Facility: CLINIC | Age: 65
End: 2020-07-21
Payer: MEDICARE

## 2020-07-21 ENCOUNTER — MEDICAL CORRESPONDENCE (OUTPATIENT)
Dept: HEALTH INFORMATION MANAGEMENT | Facility: CLINIC | Age: 65
End: 2020-07-21

## 2020-07-21 DIAGNOSIS — K56.609 SMALL BOWEL OBSTRUCTION (H): Primary | ICD-10-CM

## 2020-07-21 PROCEDURE — 99213 OFFICE O/P EST LOW 20 MIN: CPT | Performed by: SURGERY

## 2020-07-21 NOTE — LETTER
[facility]    [address] [facility], [address]  [place], [place] [address]  Phone [phone]  Fax [phone] [address], [address]  [facility] [place] [address]  Phone [phone]  Fax [phone]    [email or web address]     2020    Re: Bhavani White  : 1955      Surgery     Patient follows up regarding recent hospitalization for a small bowel obstruction.  She is well-known to me due to her complex surgical history.  She was admitted with a proximal bowel obstruction that did not resolve. Ultimately, a gastrostomy tube was placed and her bowel obstruction completely resolved.  Since that time, she went home and has been doing very well.  She has been tolerating a regular diet and having regular bowel movements.  She has not had any issues with the tube and wants it removed.  She has a bowel movement every other day.  No other complaints today.     Abdomen-soft.  Multiple reducible hernias.  G-tube removed without difficulty.  No abdominal tenderness.     A/P  Greatly improved.  Has had no issues with bowel obstruction since discharge.  She looks very healthy and is at home making fantastic progress.  G-tube was removed and she is not using it and complains of pain.  She also inquired about multiple incisional hernias.  These do not bother her very much and I would suggest she continues to follow them at this time.  She can return as needed.     Ayan Lopez M.D.  [facility]  [phone]

## 2020-07-21 NOTE — PROGRESS NOTES
Surgery    Patient follows up regarding recent hospitalization for a small bowel obstruction.  She is well-known to me due to her complex surgical history.  She was admitted with a proximal bowel obstruction that did not resolve.  Ultimately, a gastrostomy tube was placed and her bowel obstruction completely resolved.  Since that time, she went home and has been doing very well.  She has been tolerating a regular diet and having regular bowel movements.  She has not had any issues with the tube and wants it removed.  She has a bowel movement every other day.  No other complaints today.    Abdomen-soft.  Multiple reducible hernias.  G-tube removed without difficulty.  No abdominal tenderness.    A/P  Greatly improved.  Has had no issues with bowel obstruction since discharge.  She looks very healthy and is at home making fantastic progress.  G-tube was removed and she is not using it and complains of pain.  She also inquired about multiple incisional hernias.  These do not bother her very much and I would suggest she continues to follow them at this time.  She can return as needed.    Ayan Lopez M.D.  [facility]  [phone]

## 2020-07-24 ENCOUNTER — TELEPHONE (OUTPATIENT)
Dept: UROLOGY | Facility: CLINIC | Age: 65
End: 2020-07-24

## 2020-07-24 NOTE — TELEPHONE ENCOUNTER
[facility] Call Center    Phone Message    May a detailed message be left on voicemail: yes     Reason for Call: Order(s): Other:   Reason for requested: Change catheter once a month for 2 months, 3 PRN   Date needed: as soon as possible   Provider name: Dr. Rios    She would also like to get clarification on other orders. Please call Victoria back for verbal orders.        Action Taken: Message routed to:  [facility] ([facility]):  uro     Travel Screening: Not Applicable

## 2020-07-25 ENCOUNTER — APPOINTMENT (OUTPATIENT)
Dept: LAB | Facility: CLINIC | Age: 65
End: 2020-07-25
Attending: FAMILY MEDICINE
Payer: MEDICARE

## 2020-07-31 ENCOUNTER — PATIENT OUTREACH (OUTPATIENT)
Dept: CARE COORDINATION | Facility: CLINIC | Age: 65
End: 2020-07-31

## 2020-07-31 NOTE — PROGRESS NOTES
Clinic Care Coordination Contact  [facility]/Voicemail       Clinical Data: Care Coordinator Outreach  Outreach attempted x 1.  Left message on patient's voicemail with call back information and requested return call.  Plan:Care Coordinator will try to reach patient again in 3-5 business days.

## 2020-08-05 DIAGNOSIS — E11.21 TYPE 2 DIABETES MELLITUS WITH DIABETIC NEPHROPATHY, UNSPECIFIED WHETHER LONG TERM INSULIN USE (H): Primary | ICD-10-CM

## 2020-08-07 ENCOUNTER — PATIENT OUTREACH (OUTPATIENT)
Dept: CARE COORDINATION | Facility: CLINIC | Age: 65
End: 2020-08-07

## 2020-08-07 NOTE — PROGRESS NOTES
Clinic Care Coordination Contact  [facility]/Voicemail       Clinical Data: Care Coordinator Outreach  Outreach attempted x 2.  Left message on patient's voicemail with call back information and requested return call.  Plan: Care Coordinator will try to reach patient again in 10 business days.

## 2020-08-17 ENCOUNTER — MEDICAL CORRESPONDENCE (OUTPATIENT)
Dept: HEALTH INFORMATION MANAGEMENT | Facility: CLINIC | Age: 65
End: 2020-08-17

## 2020-08-18 ENCOUNTER — PATIENT OUTREACH (OUTPATIENT)
Dept: CARE COORDINATION | Facility: CLINIC | Age: 65
End: 2020-08-18
Payer: MEDICARE

## 2020-08-18 NOTE — PROGRESS NOTES
Clinic Care Coordination Contact  [facility]/Voicemail    BPCI-A Two Month Follow Up  Hospital discharge date: 6/16/20       Clinical Data: Care Coordinator Outreach  Outreach attempted x 1.  Left message on patient's voicemail with call back information and requested return call.  Plan: Care Coordinator will try to reach patient again in 3-5 business days.        Anabelle Beth RN Clinic Care Coordination

## 2020-08-28 ENCOUNTER — TRANSFERRED RECORDS (OUTPATIENT)
Dept: HEALTH INFORMATION MANAGEMENT | Facility: CLINIC | Age: 65
End: 2020-08-28

## 2020-08-28 LAB — RETINOPATHY: NEGATIVE

## 2020-09-01 ENCOUNTER — PATIENT OUTREACH (OUTPATIENT)
Dept: CARE COORDINATION | Facility: CLINIC | Age: 65
End: 2020-09-01

## 2020-09-01 NOTE — PROGRESS NOTES
Clinic Care Coordination Contact  [facility]/Voicemail       Clinical Data: Care Coordinator Outreach  Outreach attempted x 3.  Left message on patient's voicemail with call back information and requested return call.  Plan:  Care Coordinator will try to reach patient again in 10 business days.

## 2020-09-02 NOTE — PROGRESS NOTES
Clinic Care Coordination Contact  [facility]/Voicemail       Clinical Data: Care Coordinator Outreach  Outreach attempted x 2.  Left message on patient's voicemail with call back information, and requested return call.  Message included reminder of upcoming 90 day post hospitalization monitoring end date of 9/16/20 and RN Care Coordinator will continue to be available if needed through remaining BPCI-A coverage period.  Plan:  RN Care Coordinator will do no further outreaches at this time.      Anabelle Beth RN Clinic Care Coordination

## 2020-09-17 ENCOUNTER — TELEPHONE (OUTPATIENT)
Dept: CARE COORDINATION | Facility: CLINIC | Age: 65
End: 2020-09-17

## 2020-09-17 ENCOUNTER — TELEPHONE (OUTPATIENT)
Dept: FAMILY MEDICINE | Facility: CLINIC | Age: 65
End: 2020-09-17

## 2020-09-17 NOTE — TELEPHONE ENCOUNTER
Called [facility] Nurse and gave verbal approval of skill nursing, Catheter change, and  phone visit for a patient.    Derian Montague RN, BSN

## 2020-09-17 NOTE — TELEPHONE ENCOUNTER
Nurse named Lola from [facility] calling for a patient and requesting orders;    Skill nursing 1 time every month for next 2 months and 2 skill nursing PRN.    24 Slovak Catheter change 1 time every month for next 2 months and 2 PRN of 24 Irish catheter change.     phone visit for one 1 time to discuss future plan needs    [facility]   Nurse (Lola)  [phone]     Routed to Skylar Snell please review and advise    Derian Montague RN

## 2020-09-18 ENCOUNTER — PATIENT OUTREACH (OUTPATIENT)
Dept: CARE COORDINATION | Facility: CLINIC | Age: 65
End: 2020-09-18

## 2020-09-18 NOTE — PROGRESS NOTES
Clinic Care Coordination Contact  [facility]/Voicemail       Clinical Data: Care Coordinator Outreach  Outreach attempted x 4.  Left message on patient's voicemail with call back information and requested return call.  Plan:  Care Coordinator will try to reach patient again in 10 business days.

## 2020-09-22 ENCOUNTER — PATIENT OUTREACH (OUTPATIENT)
Dept: CARE COORDINATION | Facility: CLINIC | Age: 65
End: 2020-09-22

## 2020-09-22 DIAGNOSIS — Z76.89 HEALTH CARE HOME: Primary | ICD-10-CM

## 2020-09-22 NOTE — PROGRESS NOTES
Clinic Care Coordination Contact    Situation:  Patient chart reviewed by RN Care Coordinator for BPCI-A graduation approval.    Background:  Patient was hospitalized at [facility] from 6/3/20 to 6/16/20 with a proximal SBO, and has been followed by Care Coordination for 90 day post hospitalization monitoring, per BPCI-A guidelines.       Assessment:  90 day BPCI-A monitoring ended 9/16/20.  BPCI-A care coordination team has been unable to reach patient since initial assessment on 7/14/20.  Per BPCI-A assessment and chart review, pt has home care services through [facility].  She has had follow up with surgery on 7/21/20 and G-tube was removed at that time, and follow up as needed.      Last Virtual Visit with PCP, Dr. Moore, was on 4/6/20.  Pt has not had a follow up with PCP since hospital discharge 6/16/20; pt cancelled appts with PCP on 7/2/20 & 7/20/20.      Pt is due for a follow up PCP (pt cancelled 7/2/20 & 7/20/20 appts), and cardiology (see letter dated 9/9/20).    Referring to CCC for further outreach/follow up.    Plan/Recommendations: As BPCI-A monitoring completed, RN Care Coordinator placed a new Care Coordination referral through patient's primary care clinic.  [facility] team removed from care team, and BPCI-A episode resolved.       Anabelle Beth RN Clinic Care Coordination

## 2020-09-23 ENCOUNTER — PATIENT OUTREACH (OUTPATIENT)
Dept: CARE COORDINATION | Facility: CLINIC | Age: 65
End: 2020-09-23

## 2020-09-23 NOTE — LETTER
[place] CARE COORDINATION  [address]  [place] [address]     September 24, 2020    Bhavani White  [address]  [place] [id number]      Dear Bhavani,    I am a clinic care coordinator who works with Skylar Moore MD at  [facility]. I recently tried to call and was unable to reach you. Below is a description of clinic care coordination and how I can further assist you.      The clinic care coordination team is made up of a registered nurse,  and community health worker who understand the health care system. The goal of clinic care coordination is to help you manage your health and improve access to the health care system in the most efficient manner. The team can assist you in meeting your health care goals by providing education, coordinating services, strengthening the communication among your providers and supporting you with any resource needs.    Please feel free to contact me at [phone] with any questions or concerns. We are focused on providing you with the highest-quality healthcare experience possible and that all starts with you.     Sincerely,     Ebony Nguyen RN Care Coordinator  [facility]  Email: [email or web address]  Phone: [phone]

## 2020-09-23 NOTE — PROGRESS NOTES
Clinic Care Coordination Contact  [facility]/Voicemail    Referral Source: Care Team(BPCIA)  Clinical Data: Care Coordinator Outreach  Outreach attempted x 1.  Left message on patient's voicemail with call back information and requested return call.  Plan: Care Coordinator will try to reach patient again in 1-2 business days.    Ebony Nguyen RN Care Coordinator  [facility]  Email: [email or web address]  Phone: [phone]

## 2020-09-24 NOTE — PROGRESS NOTES
Clinic Care Coordination Contact  [facility]/Voicemail    Referral Source: Care Team(BPCIA)  Clinical Data: Care Coordinator Outreach  Outreach attempted x 2.  Left message on patient's voicemail with call back information and requested return call.  Plan: Care Coordinator will send care coordination introduction letter with care coordinator contact information and explanation of care coordination services via [employer]. Care Coordinator will do no further outreaches at this time.    Ebony Nguyen RN Care Coordinator  [facility]  Email: [email or web address]  Phone: [phone]

## 2020-09-25 ENCOUNTER — TELEPHONE (OUTPATIENT)
Dept: FAMILY MEDICINE | Facility: CLINIC | Age: 65
End: 2020-09-25

## 2020-09-25 NOTE — TELEPHONE ENCOUNTER
Forms/Letter Request    Name of form/letter: [employer]     Have you been seen for this request: No    Do we have the form/letter: Yes: in folder     When is form/letter needed by: ASAP    How would you like the form/letter returned: please call patient     Patient Notified form requests are processed in 3-5 business days:Yes    Okay to leave a detailed message? Yes Work number on file:  There is no work phone number on file. or Cell number on file:    Telephone Information:   Mobile [phone]         Treva Xiong

## 2020-09-28 NOTE — TELEPHONE ENCOUNTER
Left message to have her call me back---I need to assist patient with complete her [employer] mobility forms     Saba oL/LORENZA Balderas---[facility]

## 2020-10-02 NOTE — TELEPHONE ENCOUNTER
Patient contacted  and states would like her forms mailed to her, form mailed    Saba Lo/[facility]---[facility]

## 2020-10-09 ENCOUNTER — TELEPHONE (OUTPATIENT)
Dept: FAMILY MEDICINE | Facility: CLINIC | Age: 65
End: 2020-10-09

## 2020-10-09 DIAGNOSIS — Z53.9 DIAGNOSIS NOT YET DEFINED: Primary | ICD-10-CM

## 2020-10-09 PROCEDURE — G0179 MD RECERTIFICATION HHA PT: HCPCS | Performed by: FAMILY MEDICINE

## 2020-10-09 NOTE — TELEPHONE ENCOUNTER
Order/Referral Request:    Who is requesting: [place] home care     Orders being requested:Home health certification and plan of care.    Where to send Orders: Fax      Order has been completed and faxed to [phone]      Treva Xiong

## 2020-10-13 ENCOUNTER — TELEPHONE (OUTPATIENT)
Dept: FAMILY MEDICINE | Facility: CLINIC | Age: 65
End: 2020-10-13

## 2020-10-13 NOTE — TELEPHONE ENCOUNTER
Summary:    Patient is due/failing the following:   Eye exam    Reviewed:  [x] CARE EVERYWHERE  [x] LAST OV NOTE INCLUDING ENDO  [x] FYI TAB  [x] MYCHART ACTIVE?  [x] LAST PANEL ENCOUNTER  [x] FUTURE APPTS  [x] IMMUNIZATIONS          Action needed:   Eye exam    Type of outreach:    per chart had eye exam 08/30/2019, we need to ask patient where they had there last eye exam so we can contact them to fax over report                                                                               Saba Lo/LORENZA  [place]---[facility]

## 2020-10-22 DIAGNOSIS — K21.9 GASTROESOPHAGEAL REFLUX DISEASE WITHOUT ESOPHAGITIS: ICD-10-CM

## 2020-10-22 RX ORDER — PANTOPRAZOLE SODIUM 40 MG/1
TABLET, DELAYED RELEASE ORAL
Qty: 90 TABLET | Refills: 1 | Status: SHIPPED | OUTPATIENT
Start: 2020-10-22 | End: 2021-04-26

## 2020-10-22 NOTE — TELEPHONE ENCOUNTER
Prescription approved per [facility] Refill Protocol.    Eloy Granger RN

## 2020-11-03 DIAGNOSIS — E11.21 TYPE 2 DIABETES MELLITUS WITH DIABETIC NEPHROPATHY, UNSPECIFIED WHETHER LONG TERM INSULIN USE (H): ICD-10-CM

## 2020-11-04 NOTE — TELEPHONE ENCOUNTER
Medication is being filled for 1 time refill only due to:  due for office visit     Please call patient and assist in scheduling visit    Sally WESLEY RN, BSN

## 2020-11-05 DIAGNOSIS — E78.5 HYPERLIPIDEMIA LDL GOAL <100: ICD-10-CM

## 2020-11-05 RX ORDER — ROSUVASTATIN CALCIUM 5 MG/1
5 TABLET, COATED ORAL AT BEDTIME
Qty: 90 TABLET | Refills: 1 | Status: SHIPPED | OUTPATIENT
Start: 2020-11-05 | End: 2021-05-04

## 2020-11-13 NOTE — LETTER
[facility]    [address], [address]  [place], [place] [address]  Phone [phone]  Fax [phone] [address], [address]  [place] [address]  Phone [phone]  Fax [phone]    [email or web address]     2020    Re: Bhavani White  : 1955      Surgery     Patient returns for hospital follow-up regarding a large perforated ulcer and readmittance for an abdominal abscess status post IR drain.  She is now at a TCU and describes that she has been feeling about the same since dismissal.  She denies having severe abdominal pain and states her pain in her abdomen is much improved prior to her previous hospitalization.  She has been tolerating full liquids without nausea.  Her J-tube is clogged but will be unclogged when she returns.  She has not had any fevers.  She denies any significant abdominal pain today.  She has multiple medical concerns including ongoing shortness of breath which is being worked up by her medical doctor at the facility.     Abdomen- midline wound healing well.  Small suture cut.  No tracking.  All drains in place.  No significant tenderness throughout.  No distention.     A/P  Seems to be doing well from a surgical standpoint.  No significant abdominal pain or distention today.  Drains with appropriate output.   -Clamp NG tube and put to gravity if nausea or pain  -Advance diet to soft and supplement with boost  -Repeat CT abdomen pelvis with contrast through G-tube in next week for follow up on abscess  -Continue daily wet-to-dry dressing changes on midline wound.  -Continue medical work-up for ongoing medical issues including shortness of breath.  -Follow-up in 2-4 weeks.  I will call facility if abnormal findings are present on CT.     Ayan Lopez M.D.  [facility]  [phone]   no

## 2020-11-14 NOTE — IR NOTE
Interventional Radiology Intra-procedural Nursing Note    Patient Name: Bhavani White  Medical Record Number: [id number]  Today's Date: February 19, 2020    Start Time: 1541  End of procedure time: 1546  Procedure: Jejunostomy Tube Exchange  Report given to: THANG Harris in ED    Other Notes: Pt. transferred to IR table. Prepped and draped appropriately. Monitoring equipment applied. NC applied. No complaints of pain at this time. Timeout recorded. No sedation administered.    16f Jejunostomy tube exchange. Site skin intact only mild redness. Site dressed with 4 x 4 and with border gauze. C/D/I.  
Implemented All Universal Safety Interventions:  [place] to call system. Call bell, personal items and telephone within reach. Instruct patient to call for assistance. Room bathroom lighting operational. Non-slip footwear when patient is off stretcher. Physically safe environment: no spills, clutter or unnecessary equipment. Stretcher in lowest position, wheels locked, appropriate side rails in place.

## 2020-11-16 ENCOUNTER — TELEPHONE (OUTPATIENT)
Dept: FAMILY MEDICINE | Facility: CLINIC | Age: 65
End: 2020-11-16

## 2020-11-16 NOTE — TELEPHONE ENCOUNTER
[facility] calling for verbal orders.     Homecare Orders Requested:     Skilled Nursing for:1 x month for catheterization management.    1x month for 2 months starting in December 2020.   2 PRNS Visits.     Changing Catherization supplies with visit. Suprapubic: 24 fr. 10cc balloon  Patient changes leg bronson every 1-2 weeks.      Homecare agency to fax orders over for review and signature of PCP.    Home Care did advise patient to see primary.     FYI: sent to provider at this time.     Marichuy Sheehan RN

## 2020-11-30 DIAGNOSIS — Z53.9 DIAGNOSIS NOT YET DEFINED: Primary | ICD-10-CM

## 2020-11-30 PROCEDURE — G0179 MD RECERTIFICATION HHA PT: HCPCS | Performed by: FAMILY MEDICINE

## 2021-01-10 ENCOUNTER — HEALTH MAINTENANCE LETTER (OUTPATIENT)
Age: 66
End: 2021-01-10

## 2021-01-15 ENCOUNTER — TELEPHONE (OUTPATIENT)
Dept: CARE COORDINATION | Facility: CLINIC | Age: 66
End: 2021-01-15

## 2021-01-15 NOTE — TELEPHONE ENCOUNTER
Rashad Lopez,    Today I will be re certifying Bhavani White with home care for catheter management. I see her monthly for catheter changes and there have been no concerns/issues.  Pt reports no changes since the last time I saw her.  Could you okay the following orders?    - SN visits 1M2 starting 2/1/2021 and 2 PRN    -SN to change SP catheter with 24 Fr 10cc catheter every month and as needed for patency.  Pt changes leg bag every 1-2 weeks. Okay to irrigate with 30-60 ml sterile water if needed to maintain patency.    Thank you!    Lola Hart RN  [facility]  [phone]

## 2021-01-26 DIAGNOSIS — Z53.9 DIAGNOSIS NOT YET DEFINED: Primary | ICD-10-CM

## 2021-01-26 PROCEDURE — G0179 MD RECERTIFICATION HHA PT: HCPCS | Performed by: FAMILY MEDICINE

## 2021-02-02 DIAGNOSIS — E11.21 TYPE 2 DIABETES MELLITUS WITH DIABETIC NEPHROPATHY, UNSPECIFIED WHETHER LONG TERM INSULIN USE (H): ICD-10-CM

## 2021-03-11 ENCOUNTER — PATIENT OUTREACH (OUTPATIENT)
Dept: FAMILY MEDICINE | Facility: CLINIC | Age: 66
End: 2021-03-11

## 2021-03-11 NOTE — TELEPHONE ENCOUNTER
Called and spoke with patient as RN PAL outreach. Patient canceled last visit as establish care with [facility]. Rescheduled appointment 3/19/2021. Plan is for  to see [facility] in scheduled appointment afterwards.     Alfonso VEGA RN

## 2021-03-12 NOTE — ANESTHESIA CARE TRANSFER NOTE
Patient: Bhavani White    Procedure(s):  EXPLORATORY LAPAROTOMY  GASTRIC-JEJUNAL FEEDING TUBE INSERTION  SECONDARY ABDOMINAL WOUND CLOSURE    Diagnosis: Duodenal ulcer perforation (H) [K26.5]  Diagnosis Additional Information: No value filed.    Anesthesia Type:   General     Note:  Airway :ETT  Patient transferred to:ICU  Comments: Transferred to ICU, ETT in place, ambu bag with 10L oxygen for transport, all monitors and alarms on for transport, IV/lines patent and drips continued per anesthesia record.  Placed on ventilator per RT on previous settings in ICU Placed on ICU monitors per ICU RN, alarms on, VSS.  Report to ICU RN. ICU Handoff: Call for PAUSE to initiate/utilize ICU HANDOFF, Identified Patient, Identified Responsible Provider, Reviewed the Pertinent Medical History, Discussed Surgical Course, Reviewed Intra-OP Anesthesia Management and Issues during Anesthesia, Set Expectations for Post Procedure Period and Allowed Opportunity for Questions and Acknowledgement of Understanding      Vitals: (Last set prior to Anesthesia Care Transfer)    CRNA VITALS  1/1/2020 1205 - 1/1/2020 1249      1/1/2020             Resp Rate (observed):  10    Resp Rate (set):  10                Electronically Signed By: ELISSA Dobbs CRNA  January 1, 2020  12:49 PM   81.6

## 2021-03-15 ENCOUNTER — TELEPHONE (OUTPATIENT)
Dept: CARE COORDINATION | Facility: CLINIC | Age: 66
End: 2021-03-15

## 2021-03-15 DIAGNOSIS — J45.41 MODERATE PERSISTENT ASTHMA WITH ACUTE EXACERBATION: ICD-10-CM

## 2021-03-15 NOTE — TELEPHONE ENCOUNTER
Rashad Urbina,     Today I did recert visit for Bhavani White with home care for catheter management. I see her monthly for catheter changes and there have been no concerns/issues.  Pt reports no changes since the last time I saw her.  Could you okay the following orders?     - SN visits 1M2 Scheduled visits starting in April and 2 PRN     -SN to change SP catheter with 24 Fr 10cc catheter every month and as needed for patency.  Pt changes leg bag every 1-2 weeks. Okay to irrigate with 30-60 ml sterile water if needed to maintain patency.    - Also I noticed that Epic's med list has three meds that need updating: Pt is now taking Trazadone 150 mg at HS, it is entered as 100 mg in [facility], this is per rx bottle in the home.  [facility] does not have artificial tear PRN or multivitamin daily listed.     Thank you!     Lola Hart RN  [facility]  [phone]

## 2021-03-16 RX ORDER — MULTIVITAMIN,THERAPEUTIC
1 TABLET ORAL DAILY
COMMUNITY
End: 2023-01-01

## 2021-03-16 RX ORDER — ALBUTEROL SULFATE 90 UG/1
2 AEROSOL, METERED RESPIRATORY (INHALATION) EVERY 6 HOURS PRN
COMMUNITY
Start: 2021-03-16 | End: 2021-03-19

## 2021-03-18 ASSESSMENT — ANXIETY QUESTIONNAIRES
1. FEELING NERVOUS, ANXIOUS, OR ON EDGE: NOT AT ALL
2. NOT BEING ABLE TO STOP OR CONTROL WORRYING: SEVERAL DAYS
6. BECOMING EASILY ANNOYED OR IRRITABLE: SEVERAL DAYS
3. WORRYING TOO MUCH ABOUT DIFFERENT THINGS: SEVERAL DAYS
7. FEELING AFRAID AS IF SOMETHING AWFUL MIGHT HAPPEN: SEVERAL DAYS
GAD7 TOTAL SCORE: 4
4. TROUBLE RELAXING: NOT AT ALL
GAD7 TOTAL SCORE: 4
GAD7 TOTAL SCORE: 4
5. BEING SO RESTLESS THAT IT IS HARD TO SIT STILL: NOT AT ALL
7. FEELING AFRAID AS IF SOMETHING AWFUL MIGHT HAPPEN: SEVERAL DAYS

## 2021-03-18 ASSESSMENT — PATIENT HEALTH QUESTIONNAIRE - PHQ9
10. IF YOU CHECKED OFF ANY PROBLEMS, HOW DIFFICULT HAVE THESE PROBLEMS MADE IT FOR YOU TO DO YOUR WORK, TAKE CARE OF THINGS AT HOME, OR GET ALONG WITH OTHER PEOPLE: SOMEWHAT DIFFICULT
SUM OF ALL RESPONSES TO PHQ QUESTIONS 1-9: 1
SUM OF ALL RESPONSES TO PHQ QUESTIONS 1-9: 1

## 2021-03-19 ENCOUNTER — OFFICE VISIT (OUTPATIENT)
Dept: FAMILY MEDICINE | Facility: CLINIC | Age: 66
End: 2021-03-19
Payer: MEDICARE

## 2021-03-19 VITALS
SYSTOLIC BLOOD PRESSURE: 131 MMHG | HEIGHT: 65 IN | BODY MASS INDEX: 38.85 KG/M2 | OXYGEN SATURATION: 95 % | RESPIRATION RATE: 14 BRPM | HEART RATE: 75 BPM | DIASTOLIC BLOOD PRESSURE: 84 MMHG | WEIGHT: 233.2 LBS | TEMPERATURE: 98.5 F

## 2021-03-19 DIAGNOSIS — Z76.89 ENCOUNTER TO ESTABLISH CARE WITH NEW DOCTOR: Primary | ICD-10-CM

## 2021-03-19 DIAGNOSIS — J45.30 MILD PERSISTENT ASTHMA WITHOUT COMPLICATION: ICD-10-CM

## 2021-03-19 DIAGNOSIS — G82.20 PARAPLEGIA (H): ICD-10-CM

## 2021-03-19 DIAGNOSIS — Z99.3 WHEELCHAIR BOUND: ICD-10-CM

## 2021-03-19 DIAGNOSIS — L65.9 HAIR LOSS: ICD-10-CM

## 2021-03-19 DIAGNOSIS — M79.7 FIBROMYALGIA: ICD-10-CM

## 2021-03-19 DIAGNOSIS — R21 RASH: ICD-10-CM

## 2021-03-19 DIAGNOSIS — I50.32 CHRONIC DIASTOLIC (CONGESTIVE) HEART FAILURE (H): ICD-10-CM

## 2021-03-19 DIAGNOSIS — E11.21 TYPE 2 DIABETES MELLITUS WITH DIABETIC NEPHROPATHY, WITHOUT LONG-TERM CURRENT USE OF INSULIN (H): ICD-10-CM

## 2021-03-19 DIAGNOSIS — Z12.31 ENCOUNTER FOR SCREENING MAMMOGRAM FOR BREAST CANCER: ICD-10-CM

## 2021-03-19 PROBLEM — K26.5 PERFORATED DUODENAL ULCER (H): Status: RESOLVED | Noted: 2019-12-29 | Resolved: 2021-03-19

## 2021-03-19 PROBLEM — A41.9 SEPSIS (H): Status: RESOLVED | Noted: 2020-03-16 | Resolved: 2021-03-19

## 2021-03-19 PROBLEM — K65.1 ABDOMINOPELVIC ABSCESS (H): Status: RESOLVED | Noted: 2020-02-01 | Resolved: 2021-03-19

## 2021-03-19 PROBLEM — L03.311 ABDOMINAL WALL CELLULITIS: Status: RESOLVED | Noted: 2020-03-16 | Resolved: 2021-03-19

## 2021-03-19 PROBLEM — K56.609 SBO (SMALL BOWEL OBSTRUCTION) (H): Status: RESOLVED | Noted: 2020-06-03 | Resolved: 2021-03-19

## 2021-03-19 LAB
ALBUMIN SERPL-MCNC: 3.5 G/DL (ref 3.4–5)
ALP SERPL-CCNC: 93 U/L (ref 40–150)
ALT SERPL W P-5'-P-CCNC: 27 U/L (ref 0–50)
ANION GAP SERPL CALCULATED.3IONS-SCNC: 1 MMOL/L (ref 3–14)
AST SERPL W P-5'-P-CCNC: 24 U/L (ref 0–45)
BASOPHILS # BLD AUTO: 0 10E9/L (ref 0–0.2)
BASOPHILS NFR BLD AUTO: 0.3 %
BILIRUB SERPL-MCNC: 0.3 MG/DL (ref 0.2–1.3)
BUN SERPL-MCNC: 18 MG/DL (ref 7–30)
CALCIUM SERPL-MCNC: 10 MG/DL (ref 8.5–10.1)
CHLORIDE SERPL-SCNC: 101 MMOL/L (ref 94–109)
CO2 SERPL-SCNC: 33 MMOL/L (ref 20–32)
CREAT SERPL-MCNC: 0.87 MG/DL (ref 0.52–1.04)
DIFFERENTIAL METHOD BLD: ABNORMAL
EOSINOPHIL # BLD AUTO: 0.1 10E9/L (ref 0–0.7)
EOSINOPHIL NFR BLD AUTO: 1.6 %
ERYTHROCYTE [DISTWIDTH] IN BLOOD BY AUTOMATED COUNT: 16 % (ref 10–15)
GFR SERPL CREATININE-BSD FRML MDRD: 69 ML/MIN/{1.73_M2}
GLUCOSE SERPL-MCNC: 96 MG/DL (ref 70–99)
HBA1C MFR BLD: 5.8 % (ref 0–5.6)
HCT VFR BLD AUTO: 44.2 % (ref 35–47)
HGB BLD-MCNC: 13.7 G/DL (ref 11.7–15.7)
IRON SATN MFR SERPL: 12 % (ref 15–46)
IRON SERPL-MCNC: 36 UG/DL (ref 35–180)
LYMPHOCYTES # BLD AUTO: 1.1 10E9/L (ref 0.8–5.3)
LYMPHOCYTES NFR BLD AUTO: 16.1 %
MCH RBC QN AUTO: 24.9 PG (ref 26.5–33)
MCHC RBC AUTO-ENTMCNC: 31 G/DL (ref 31.5–36.5)
MCV RBC AUTO: 80 FL (ref 78–100)
MONOCYTES # BLD AUTO: 0.4 10E9/L (ref 0–1.3)
MONOCYTES NFR BLD AUTO: 5.7 %
NEUTROPHILS # BLD AUTO: 5.4 10E9/L (ref 1.6–8.3)
NEUTROPHILS NFR BLD AUTO: 76.3 %
PLATELET # BLD AUTO: 241 10E9/L (ref 150–450)
POTASSIUM SERPL-SCNC: 4.4 MMOL/L (ref 3.4–5.3)
PROT SERPL-MCNC: 7.9 G/DL (ref 6.8–8.8)
RBC # BLD AUTO: 5.5 10E12/L (ref 3.8–5.2)
SODIUM SERPL-SCNC: 135 MMOL/L (ref 133–144)
TIBC SERPL-MCNC: 304 UG/DL (ref 240–430)
TSH SERPL DL<=0.005 MIU/L-ACNC: 0.61 MU/L (ref 0.4–4)
WBC # BLD AUTO: 7 10E9/L (ref 4–11)

## 2021-03-19 PROCEDURE — 93000 ELECTROCARDIOGRAM COMPLETE: CPT | Performed by: FAMILY MEDICINE

## 2021-03-19 PROCEDURE — 84443 ASSAY THYROID STIM HORMONE: CPT | Performed by: FAMILY MEDICINE

## 2021-03-19 PROCEDURE — 85025 COMPLETE CBC W/AUTO DIFF WBC: CPT | Performed by: FAMILY MEDICINE

## 2021-03-19 PROCEDURE — 83540 ASSAY OF IRON: CPT | Performed by: FAMILY MEDICINE

## 2021-03-19 PROCEDURE — 80053 COMPREHEN METABOLIC PANEL: CPT | Performed by: FAMILY MEDICINE

## 2021-03-19 PROCEDURE — 83550 IRON BINDING TEST: CPT | Performed by: FAMILY MEDICINE

## 2021-03-19 PROCEDURE — 82043 UR ALBUMIN QUANTITATIVE: CPT | Performed by: FAMILY MEDICINE

## 2021-03-19 PROCEDURE — 83036 HEMOGLOBIN GLYCOSYLATED A1C: CPT | Performed by: FAMILY MEDICINE

## 2021-03-19 PROCEDURE — 99214 OFFICE O/P EST MOD 30 MIN: CPT | Performed by: FAMILY MEDICINE

## 2021-03-19 PROCEDURE — 36415 COLL VENOUS BLD VENIPUNCTURE: CPT | Performed by: FAMILY MEDICINE

## 2021-03-19 RX ORDER — ALBUTEROL SULFATE 90 UG/1
2 AEROSOL, METERED RESPIRATORY (INHALATION) EVERY 6 HOURS PRN
Qty: 18 G | Refills: 1 | Status: SHIPPED | OUTPATIENT
Start: 2021-03-19 | End: 2021-05-11

## 2021-03-19 RX ORDER — ASPIRIN 81 MG/1
81 TABLET ORAL EVERY MORNING
COMMUNITY

## 2021-03-19 RX ORDER — DESONIDE 0.5 MG/G
OINTMENT TOPICAL 2 TIMES DAILY
Qty: 60 G | Refills: 1 | Status: SHIPPED | OUTPATIENT
Start: 2021-03-19 | End: 2023-01-01

## 2021-03-19 ASSESSMENT — ASTHMA QUESTIONNAIRES
QUESTION_2 LAST FOUR WEEKS HOW OFTEN HAVE YOU HAD SHORTNESS OF BREATH: ONCE OR TWICE A WEEK
ACT_TOTALSCORE: 21
QUESTION_5 LAST FOUR WEEKS HOW WOULD YOU RATE YOUR ASTHMA CONTROL: WELL CONTROLLED
QUESTION_3 LAST FOUR WEEKS HOW OFTEN DID YOUR ASTHMA SYMPTOMS (WHEEZING, COUGHING, SHORTNESS OF BREATH, CHEST TIGHTNESS OR PAIN) WAKE YOU UP AT NIGHT OR EARLIER THAN USUAL IN THE MORNING: NOT AT ALL
QUESTION_4 LAST FOUR WEEKS HOW OFTEN HAVE YOU USED YOUR RESCUE INHALER OR NEBULIZER MEDICATION (SUCH AS ALBUTEROL): TWO OR THREE TIMES PER WEEK
QUESTION_1 LAST FOUR WEEKS HOW MUCH OF THE TIME DID YOUR ASTHMA KEEP YOU FROM GETTING AS MUCH DONE AT WORK, SCHOOL OR AT HOME: NONE OF THE TIME

## 2021-03-19 ASSESSMENT — ANXIETY QUESTIONNAIRES: GAD7 TOTAL SCORE: 4

## 2021-03-19 ASSESSMENT — MIFFLIN-ST. JEOR: SCORE: 1608.67

## 2021-03-19 ASSESSMENT — PATIENT HEALTH QUESTIONNAIRE - PHQ9: SUM OF ALL RESPONSES TO PHQ QUESTIONS 1-9: 1

## 2021-03-19 NOTE — LETTER
My Asthma Action Plan    Name: Bhavani White   YOB: 1955  Date: 3/19/2021   My doctor: Sapphire Urbina MD   My clinic: [facility]        My Rescue Medicine:   Albuterol inhaler (Proair/Ventolin/Proventil HFA)  2-4 puffs EVERY 4 HOURS as needed. Use a spacer if recommended by your provider.   My Asthma Severity:   Intermittent / Exercise Induced  Know your asthma triggers: smoke, cold air and seasonal changes             GREEN ZONE   Good Control    I feel good    No cough or wheeze    Can work, sleep and play without asthma symptoms       Take your asthma control medicine every day.     1. If exercise triggers your asthma, take your rescue medication    15 minutes before exercise or sports, and    During exercise if you have asthma symptoms  2. Spacer to use with inhaler: If you have a spacer, make sure to use it with your inhaler             YELLOW ZONE Getting Worse  I have ANY of these:    I do not feel good    Cough or wheeze    Chest feels tight    Wake up at night   1. Keep taking your Green Zone medications  2. Start taking your rescue medicine:    every 20 minutes for up to 1 hour. Then every 4 hours for 24-48 hours.  3. If you stay in the Yellow Zone for more than 12-24 hours, contact your doctor.  4. If you do not return to the Green Zone in 12-24 hours or you get worse, start taking your oral steroid medicine if prescribed by your provider.           RED ZONE Medical Alert - Get Help  I have ANY of these:    I feel awful    Medicine is not helping    Breathing getting harder    Trouble walking or talking    Nose opens wide to breathe       1. Take your rescue medicine NOW  2. If your provider has prescribed an oral steroid medicine, start taking it NOW  3. Call your doctor NOW  4. If you are still in the Red Zone after 20 minutes and you have not reached your doctor:    Take your rescue medicine again and    Call 911 or go to the emergency room right  away    See your regular doctor within 2 weeks of an Emergency Room or Urgent Care visit for follow-up treatment.          Annual Reminders:  Meet with Asthma Educator,  Flu Shot in the Fall, consider Pneumonia Vaccination for patients with asthma (aged 19 and older).    Pharmacy:    [id number] [address] AT [place] [address]  [facility] - [address]    Electronically signed by Sapphire Urbina MD   Date: 03/19/21                    Asthma Triggers  How To Control Things That Make Your Asthma Worse    Triggers are things that make your asthma worse.  Look at the list below to help you find your triggers and   what you can do about them. You can help prevent asthma flare-ups by staying away from your triggers.      Trigger                                                          What you can do   Cigarette Smoke  Tobacco smoke can make asthma worse. Do not allow smoking in your home, car or around you.  Be sure no one smokes at a child s day care or school.  If you smoke, ask your health care provider for ways to help you quit.  Ask family members to quit too.  Ask your health care provider for a referral to Quit Plan to help you quit smoking, or call 8-015-514-PLAN.     Colds, Flu, Bronchitis  These are common triggers of asthma. Wash your hands often.  Don t touch your eyes, nose or mouth.  Get a flu shot every year.     Dust Mites  These are tiny bugs that live in cloth or carpet. They are too small to see. Wash sheets and blankets in hot water every week.   Encase pillows and mattress in dust mite proof covers.  Avoid having carpet if you can. If you have carpet, vacuum weekly.   Use a dust mask and HEPA vacuum.   Pollen and Outdoor Mold  Some people are allergic to trees, grass, or weed pollen, or molds. Try to keep your windows closed.  Limit time out doors when pollen count is high.   Ask you health care provider  about taking medicine during allergy season.     Animal Dander  Some people are allergic to skin flakes, urine or saliva from pets with fur or feathers. Keep pets with fur or feathers out of your home.    If you can t keep the pet outdoors, then keep the pet out of your bedroom.  Keep the bedroom door closed.  Keep pets off cloth furniture and away from stuffed toys.     Mice, Rats, and Cockroaches  Some people are allergic to the waste from these pests.   Cover food and garbage.  Clean up spills and food crumbs.  Store grease in the refrigerator.   Keep food out of the bedroom.   Indoor Mold  This can be a trigger if your home has high moisture. Fix leaking faucets, pipes, or other sources of water.   Clean moldy surfaces.  Dehumidify basement if it is damp and smelly.   Smoke, Strong Odors, and Sprays  These can reduce air quality. Stay away from strong odors and sprays, such as perfume, powder, hair spray, paints, smoke incense, paint, cleaning products, candles and new carpet.   Exercise or Sports  Some people with asthma have this trigger. Be active!  Ask your doctor about taking medicine before sports or exercise to prevent symptoms.    Warm up for 5-10 minutes before and after sports or exercise.     Other Triggers of Asthma  Cold air:  Cover your nose and mouth with a scarf.  Sometimes laughing or crying can be a trigger.  Some medicines and food can trigger asthma.

## 2021-03-19 NOTE — PROGRESS NOTES
Assessment & Plan     Encounter to establish care with new doctor  Reviewed problem list, medications, allergies, past medical history, surgical history, social history, and family history.    Rash  No rash today, but pt says if she stays on top of it with ointment, its fine.  Refill, follow up as needed.  - desonide (DESOWEN) 0.05 % external ointment; Apply topically 2 times daily    Hair loss  Will order TSH and iron panel, further recommendations pending results.  Can consider derm referral.  - Iron and iron binding capacity    Type 2 diabetes mellitus with diabetic nephropathy, without long-term current use of insulin (H)  Due for labs, well controlled in past.  Further recommendations pending results.  Follow up in 3 months or sooner as needed with fasting labs.  - Comprehensive metabolic panel (BMP + Alb, Alk Phos, ALT, AST, Total. Bili, TP)  - CBC with platelets and differential  - TSH with free T4 reflex  - Hemoglobin A1c  - Albumin Random Urine Quantitative with Creat Ratio  - Lipid panel reflex to direct LDL Fasting; Future    Chronic diastolic (congestive) heart failure (H)  Referral to cardiology for follow up, med review.  Follow up as needed after visit.  - CARDIOLOGY EVAL ADULT REFERRAL; Future    Mild persistent asthma without complication  Refill medication today, Asthma action plan completed, ACT 21 today.  Well controlled.  - VENTOLIN  (90 Base) MCG/ACT inhaler; Inhale 2 puffs into the lungs every 6 hours as needed for shortness of breath / dyspnea    Fibromyalgia, Chronic Pain -- On Methadone  Pt reports that pain clinic needs an EKG, will order today and fax results to them.  - EKG 12-lead complete w/read - Clinics    Encounter for screening mammogram for breast cancer  - *MA Screening Digital Bilateral; Future    Wheelchair bound  Patient remains in her wheelchair due to paraplegia and chronic lumbar spine pain.  She will need routine wheelchair maintenance. (Addendum made 4-30-21 by  Sapphire Urbina M.D.)    39 minutes spent on the date of the encounter doing chart review, history and exam, documentation and further activities as noted above       See Patient Instructions    Return in about 3 months (around 6/19/2021) for Diabetes Recheck, Cardiology Follow up Visit.    Sapphire Urbina MD  [facility] BARBARA Beckham is a 65 year old who presents for the following health issues  accompanied by her spouse:    History of Present Illness     Asthma:  She presents for follow up of asthma.  She has no cough, no wheezing, and no shortness of breath. She is using a relief medication a few times a week. She does not have a controller medication. Patient is aware of the following triggers: cold air, emotions and smoke. The patient has not had a visit to the Emergency Room, Urgent Care or Hospital due to asthma since the last clinic visit.     She eats 2-3 servings of fruits and vegetables daily.She consumes 0 sweetened beverage(s) daily.She exercises with enough effort to increase her heart rate 9 or less minutes per day.  She exercises with enough effort to increase her heart rate 3 or less days per week.   She is taking medications regularly.       Concern - Establish care  Description: Pt want to discuss about future care for DM. She also has concerns about hair loss    1.  Left ear, dry skin, painful. History of eczematous rash.   Was given a desonide ointment, helpful when used.  Needs refill.    2.  Hair loss.  Frontal loss, wondering if her thyroid is the cause.  Would like to get this checked out today and any other labs if needed.      3.  Takes Metformin for diabetes.  Wondering if this is appropriate for her.  Due for labs.    4.  Cardiology:  History of Chronic Diastolic CHF, not on beta blocker or ACEI.  Last ECHO within one year, Grade II diastolic dysfunction with preserved EF.  Has not seen a cardiologist in a while, unsure why she is not on BB or  ACEI, thinks it was because of her blood pressure being low during her hospitalization, but unsure.    5.  Needs refills on albuterol inhaler    Patient Active Problem List   Diagnosis     Chronic low back pain     Moderate major depression (H)     Anxiety     DM type 2, w Neuropathy -- Hgb A1C 7.1 on 1/7/20     Hypothyroidism due to acquired atrophy of thyroid     Lumbar spinal stenosis     Fibromyalgia, Chronic Pain -- On Methadone     Osteoarthritis     Hypertension goal BP (blood pressure) < 140/90     Health Care Home     Neuropathy     Hyperlipidemia LDL goal <100     Recurrent UTI (urinary tract infection)     Controlled substance agreement signed 10/24/2014     Wheelchair bound     Elevated BMI     Asthma     Personal history of methicillin resistant Staphylococcus aureus     Neurogenic bladder, S/P Suprapubic Catheter      Ventral hernia     Osteoarthritis of cervical spine with myelopathy     DISH (diffuse idiopathic skeletal hyperostosis)     NSTEMI (non-ST elevated myocardial infarction) (H)     Fusion of spine of cervical region     ANIYA -- noncompliant with CPAP     History of MRSA infection     MRSA (methicillin resistant staph aureus) culture positive     Pain medication agreement     Chronic diastolic (congestive) heart failure (H)     Current Outpatient Medications   Medication Sig Dispense Refill     aspirin 81 MG EC tablet Take 81 mg by mouth daily       bisacodyl (DULCOLAX) 10 MG suppository Place 10 mg rectally 2 times daily as needed for constipation       desonide (DESOWEN) 0.05 % external ointment Apply topically 2 times daily 60 g 1     DULoxetine (CYMBALTA) 60 MG capsule Take 120 mg by mouth every morning       gabapentin (NEURONTIN) 800 MG tablet Take 1,600 mg by mouth 3 times daily 0800, 1200 and 1800       hydrOXYzine (ATARAX) 50 MG tablet 50 mg 2 times daily as needed        hypromellose (ARTIFICIAL TEARS) 0.5 % SOLN ophthalmic solution 1 drop every hour as needed       ipratropium -  "albuterol 0.5 mg/2.5 mg/3 mL (DUONEB) 0.5-2.5 (3) MG/3ML neb solution Take 1 vial by nebulization every 6 hours as needed for shortness of breath / dyspnea or wheezing       levothyroxine (SYNTHROID/LEVOTHROID) 88 MCG tablet TAKE 1 TABLET BY MOUTH EVERY DAY 90 tablet 2     methadone (DOLOPHINE) 10 MG tablet Take 2 tablets (20 mg) by mouth 2 times daily 20 tablet 0     miconazole (MICATIN/MICRO GUARD) 2 % external powder Apply topically 2 times daily as needed for other (topical candidiasis)  0     multivitamin, therapeutic (THERA-VIT) TABS tablet Take 1 tablet by mouth daily       ondansetron (ZOFRAN-ODT) 4 MG ODT tab Take 1 tablet (4 mg) by mouth every 6 hours as needed for nausea or vomiting       oxybutynin ER (DITROPAN XL) 15 MG 24 hr tablet Take 1 tablet (15 mg) by mouth daily 90 tablet 3     pantoprazole (PROTONIX) 40 MG EC tablet TAKE 1 TABLET(40 MG) BY MOUTH DAILY 90 tablet 1     polyethylene glycol (MIRALAX/GLYCOLAX) packet Take 17 g by mouth 2 times daily as needed (constipation)  0     rosuvastatin (CRESTOR) 5 MG tablet Take 1 tablet (5 mg) by mouth At Bedtime 90 tablet 1     senna-docusate (SENOKOT-S/PERICOLACE) 8.6-50 MG tablet Take 2 tablets by mouth 2 times daily as needed for constipation 100 tablet 0     tiZANidine (ZANAFLEX) 4 MG tablet Take 4 mg by mouth 3 times daily as needed for muscle spasms        traZODone (DESYREL) 100 MG tablet Take 150 mg by mouth At Bedtime       VENTOLIN  (90 Base) MCG/ACT inhaler Inhale 2 puffs into the lungs every 6 hours as needed for shortness of breath / dyspnea 18 g 1     metFORMIN (GLUCOPHAGE-XR) 500 MG 24 hr tablet Take 500 mg by mouth 2 times daily (with meals)           Allergies   Allergen Reactions     Povidone Iodine      \"mild skin irritation\" per patient report     Toradol [Ketorolac] Other (See Comments)     Pt gets nightmares     Tramadol Other (See Comments)     Past Medical History:   Diagnosis Date     Abdominal wall cellulitis 3/16/2020     " Abdominopelvic abscess (H) 2020     Anxiety      Asthma      Depression      DM (diabetes mellitus) (H)      Fracture of lower extremity 2013     Frequent UTI      History of blood transfusion      History of ulcer disease 2014    She notes from NSAIDs; nearly . Discussed a hospitalization at [facility] for this.     Hypertension      Hypothyroid      Iatrogenic Cushing's disease (H)     off prednisone now     Infection due to alpha-hemolytic Streptococcus 2020     Morbid obesity (H)      MRSA (methicillin resistant staph aureus) culture positive 2012    repeat cx 10/12/2012 no staph mentioned.     Osteoarthritis     multiple joings.     Other chronic pain      Perforated duodenal ulcer -- S/P Surgery 2019     SBO (small bowel obstruction) (H) 6/3/2020     Sepsis (WBC 20K, ) 3/16/2020     Sleep apnea     No longer uses cpap.       Past Surgical History:   Procedure Laterality Date     ABDOMEN SURGERY       APPLY WOUND VAC  2019    Procedure: Apply Wound Vac;  Surgeon: Ayan Lopez MD;  Location: [facility] OR     C LAMINECTOMY,FACETECTOMY,LUMBAR  1982     C TOTAL KNEE ARTHROPLASTY      left     CLOSE SECONDARY WOUND ABDOMEN N/A 2020    Procedure: SECONDARY ABDOMINAL WOUND CLOSURE;  Surgeon: Ayan Lopez MD;  Location: [facility] OR     CYSTOSCOPY N/A 2018    Procedure: CYSTOSCOPY;  Cystoscopy and Botox Injection Into the Bladder and suprapubic tube exchange;  Surgeon: Yoandy Rios MD;  [facility]     CYSTOSCOPY N/A 3/15/2019    Procedure: Cystoscopy, suprapubic tube exchange;  Surgeon: Yoandy Rios MD;  [facility]     CYSTOSCOPY FLEXIBLE, CYSTOSTOMY, INSERT TUBE SUPRAPUBIC, COMBINED N/A 2019    Procedure: Cystoscopy, Bladder Botox Injection, Suprapubic Tube Change;  Surgeon: Yoandy Rios MD;  [facility]     CYSTOSCOPY, INTRAVESICAL INJECTION N/A 2017    Procedure: CYSTOSCOPY, INTRAVESICAL  INJECTION;  Cystoscopy, Botox Injection Into The Bladder  Latex Allergy ;  Surgeon: Yoandy Rios MD;  [facility]     CYSTOSCOPY, INTRAVESICAL INJECTION N/A 3/8/2018    Procedure: CYSTOSCOPY, INTRAVESICAL INJECTION;  Cystoscopy, Botox Injection Into The Bladder and suprapubic catheter exchange;  Surgeon: Yoandy Rios MD;  [facility]     DISCECTOMY, FUSION CERVICAL ANTERIOR THREE+ LEVELS, COMBINED Left 5/3/2016    Procedure: COMBINED DISCECTOMY, FUSION CERVICAL ANTERIOR THREE+ LEVELS;  Surgeon: Jasvir Torres MD;  [facility]     ENTEROSCOPY SMALL BOWEL N/A 6/8/2020    Procedure: Enteroscopy small bowel;  Surgeon: Gabe Forte MD;  Location:  GI     GASTROSTOMY, INSERT TUBE, COMBINED N/A 1/1/2020    Procedure: GASTRIC-JEJUNAL FEEDING TUBE INSERTION;  Surgeon: Ayan Lopez MD;  Location:  OR     GENITOURINARY SURGERY      suprapubic catheter     HERNIA REPAIR  1957    double hernia     HYSTERECTOMY, PAP NO LONGER INDICATED      CELIA and large ovarian tumor removed     INJECT BOTOX N/A 9/21/2018    Procedure: INJECT BOTOX;;  Surgeon: Yoandy Rios MD;  [facility]     INJECT BOTOX N/A 3/15/2019    Procedure: Inject Botox into Bladder;  Surgeon: Yoandy Rios MD;  [facility]     IR GASTRO JEJUNOSTOMY TUBE PLACEMENT  6/11/2020     IR GASTROSTOMY TUBE CHANGE  6/30/2020     IR JEJUNOSTOMY TUBE CHANGE  2/4/2020     IR JEJUNOSTOMY TUBE CHANGE  2/19/2020     LAPAROSCOPY DIAGNOSTIC (GENERAL) N/A 12/30/2019    Procedure: Laparoscopy diagnostic (general);  Surgeon: Ayan Lopez MD;  Location:  OR     LAPAROTOMY EXPLORATORY N/A 1/1/2020    Procedure: EXPLORATORY LAPAROTOMY;  Surgeon: Ayan Lopez MD;  Location:  OR     LAPAROTOMY EXPLORATORY N/A 12/30/2019    Procedure: LAPAROTOMY, REPAIR OF ULCER PERFORATION;  Surgeon: Ayan Lopez MD;  Location:  OR     SURGICAL HISTORY OF -       left cataract surgery      "SURGICAL HISTORY OF -   12/14    right cataract surgery and left laser revision     SURGICAL HISTORY OF -   March 2015    left carpal tunnel release     TONSILLECTOMY  1974       Family History   Problem Relation Age of Onset     Depression Son      Hypertension Mother      Heart Disease Mother         bypass     Depression Mother      Hypertension Father      Connective Tissue Disorder Father         ankylosing spondylitis     Cancer Father         colon; prostate     Other Cancer Father         bladder cancer     Depression Sister        Social History     Tobacco Use     Smoking status: Never Smoker     Smokeless tobacco: Never Used   Substance Use Topics     Alcohol use: No     Frequency: Never     Drinks per session: Patient refused     Binge frequency: Patient refused         Review of Systems   Constitutional, HEENT, cardiovascular, pulmonary, gi and gu systems are negative, except as otherwise noted.      Objective    /84 (BP Location: Right arm, Patient Position: Chair, Cuff Size: Adult Large)   Pulse 75   Temp 98.5  F (36.9  C) (Oral)   Resp 14   Ht 1.651 m (5' 5\")   Wt 105.8 kg (233 lb 3.2 oz)   SpO2 95%   BMI 38.81 kg/m    Body mass index is 38.81 kg/m .  Physical Exam   GENERAL: healthy, alert and no distress  RESP: lungs clear to auscultation - no rales, rhonchi or wheezes  CV: regular rate and rhythm, normal S1 S2, no S3 or S4, no murmur, click or rub, no peripheral edema and peripheral pulses strong  MS: no gross musculoskeletal defects noted, no edema  SKIN: no suspicious lesions or rashes  PSYCH: mentation appears normal, affect normal/bright            "

## 2021-03-19 NOTE — PATIENT INSTRUCTIONS
Call to schedule mammogram: [facility] [phone],   Toll Free: [phone]

## 2021-03-20 LAB
CREAT UR-MCNC: 51 MG/DL
MICROALBUMIN UR-MCNC: 38 MG/L
MICROALBUMIN/CREAT UR: 75.15 MG/G CR (ref 0–25)

## 2021-03-20 ASSESSMENT — ASTHMA QUESTIONNAIRES: ACT_TOTALSCORE: 21

## 2021-03-22 ENCOUNTER — TELEPHONE (OUTPATIENT)
Dept: FAMILY MEDICINE | Facility: CLINIC | Age: 66
End: 2021-03-22

## 2021-03-22 NOTE — TELEPHONE ENCOUNTER
Called patient and advised of below.  She will call back with information of who EKG should be sent to and fax #.  Lakisha Marie RN

## 2021-03-22 NOTE — TELEPHONE ENCOUNTER
Sapphire Adler MD   3/19/2021  5:36 PM CDT      Please call patient and let her know her EKG looks fine.  Please also verify to whom and where she needs this faxed to.  Requested by an outside provider.

## 2021-03-29 DIAGNOSIS — Z53.9 DIAGNOSIS NOT YET DEFINED: Primary | ICD-10-CM

## 2021-03-29 PROCEDURE — G0179 MD RECERTIFICATION HHA PT: HCPCS | Performed by: FAMILY MEDICINE

## 2021-03-30 ENCOUNTER — MEDICAL CORRESPONDENCE (OUTPATIENT)
Dept: HEALTH INFORMATION MANAGEMENT | Facility: CLINIC | Age: 66
End: 2021-03-30

## 2021-03-30 ENCOUNTER — TELEPHONE (OUTPATIENT)
Dept: FAMILY MEDICINE | Facility: CLINIC | Age: 66
End: 2021-03-30

## 2021-03-30 NOTE — TELEPHONE ENCOUNTER
Received a one page fax from [employer]. Order : Repair to wheelchair. Fax to Dr. Cecilia Urbina. Ruth Behrens.

## 2021-04-14 ENCOUNTER — TELEPHONE (OUTPATIENT)
Dept: FAMILY MEDICINE | Facility: CLINIC | Age: 66
End: 2021-04-14

## 2021-04-14 NOTE — TELEPHONE ENCOUNTER
Received a 1 page fax from [employer]. Order : Repairs. Arm Pad. Fax to Dr. Cecilia Urbina. Ruth Behrens.

## 2021-04-20 ENCOUNTER — TRANSFERRED RECORDS (OUTPATIENT)
Dept: HEALTH INFORMATION MANAGEMENT | Facility: CLINIC | Age: 66
End: 2021-04-20

## 2021-04-21 ENCOUNTER — TELEPHONE (OUTPATIENT)
Dept: FAMILY MEDICINE | Facility: CLINIC | Age: 66
End: 2021-04-21

## 2021-04-21 DIAGNOSIS — Z12.11 SCREENING FOR COLON CANCER: Primary | ICD-10-CM

## 2021-04-21 NOTE — TELEPHONE ENCOUNTER
Patient Quality Outreach Summary      Summary:    Patient is due/failing the following:   FIT    Type of outreach:    Pt reqesting Fit     Questions for provider review:    None                                                                                                                    Graciela Shah MA       Chart routed to none.

## 2021-04-30 DIAGNOSIS — E03.4 HYPOTHYROIDISM DUE TO ACQUIRED ATROPHY OF THYROID: ICD-10-CM

## 2021-04-30 PROBLEM — G82.20 PARAPLEGIA (H): Status: ACTIVE | Noted: 2021-04-30

## 2021-04-30 RX ORDER — LEVOTHYROXINE SODIUM 88 UG/1
88 TABLET ORAL DAILY
Qty: 90 TABLET | Refills: 2 | Status: SHIPPED | OUTPATIENT
Start: 2021-04-30 | End: 2022-02-24

## 2021-05-08 ENCOUNTER — HEALTH MAINTENANCE LETTER (OUTPATIENT)
Age: 66
End: 2021-05-08

## 2021-05-10 DIAGNOSIS — J45.30 MILD PERSISTENT ASTHMA WITHOUT COMPLICATION: ICD-10-CM

## 2021-05-11 RX ORDER — ALBUTEROL SULFATE 90 UG/1
AEROSOL, METERED RESPIRATORY (INHALATION)
Qty: 18 G | Refills: 1 | Status: SHIPPED | OUTPATIENT
Start: 2021-05-11 | End: 2021-08-26

## 2021-05-14 ENCOUNTER — TELEPHONE (OUTPATIENT)
Dept: FAMILY MEDICINE | Facility: CLINIC | Age: 66
End: 2021-05-14

## 2021-05-14 NOTE — TELEPHONE ENCOUNTER
Homecare Orders Requested:     Lola at Phone Number [phone] from [facility] called to request orders for the following Services:    Skilled Nursing for:1 x month x 2 months and 3 prn visit for catheter, for Lola in addition to go separate for catheter issues, FYI to [facility]    Homecare agency to fax orders over for review and signature of PCP.  Ekta Mccarthy RN, BSN  Message handled by CLINIC NURSE.

## 2021-06-08 DIAGNOSIS — Z53.9 DIAGNOSIS NOT YET DEFINED: Primary | ICD-10-CM

## 2021-06-10 ENCOUNTER — TELEPHONE (OUTPATIENT)
Dept: FAMILY MEDICINE | Facility: CLINIC | Age: 66
End: 2021-06-10

## 2021-06-10 DIAGNOSIS — Z12.11 COLON CANCER SCREENING: Primary | ICD-10-CM

## 2021-06-10 NOTE — TELEPHONE ENCOUNTER
Reason for call:  Other   Patient called regarding (reason for call): call back    Additional comments: per patient , she declined the FIT and will like for an order to be sent to schedule a colonoscopy.      Phone number to reach patient:  Home number on file [phone] (home)    Best Time:  Anytime     Can we leave a detailed message on this number?  NO    Travel screening: Not Applicable

## 2021-06-15 ENCOUNTER — MEDICAL CORRESPONDENCE (OUTPATIENT)
Dept: HEALTH INFORMATION MANAGEMENT | Facility: CLINIC | Age: 66
End: 2021-06-15

## 2021-06-17 ENCOUNTER — HOSPITAL ENCOUNTER (OUTPATIENT)
Facility: CLINIC | Age: 66
End: 2021-06-17
Attending: INTERNAL MEDICINE | Admitting: INTERNAL MEDICINE
Payer: MEDICARE

## 2021-06-17 DIAGNOSIS — Z11.59 ENCOUNTER FOR SCREENING FOR OTHER VIRAL DISEASES: ICD-10-CM

## 2021-06-29 ENCOUNTER — TELEPHONE (OUTPATIENT)
Dept: FAMILY MEDICINE | Facility: CLINIC | Age: 66
End: 2021-06-29

## 2021-06-29 DIAGNOSIS — N31.9 NEUROGENIC BLADDER: ICD-10-CM

## 2021-06-29 DIAGNOSIS — R30.0 DYSURIA: Primary | ICD-10-CM

## 2021-06-29 DIAGNOSIS — N39.0 RECURRENT UTI: ICD-10-CM

## 2021-06-29 DIAGNOSIS — N30.00 ACUTE CYSTITIS WITHOUT HEMATURIA: Primary | ICD-10-CM

## 2021-06-29 LAB
ALBUMIN UR-MCNC: NEGATIVE MG/DL
APPEARANCE UR: CLEAR
BILIRUB UR QL STRIP: NEGATIVE
COLOR UR AUTO: ABNORMAL
GLUCOSE UR STRIP-MCNC: NEGATIVE MG/DL
HGB UR QL STRIP: ABNORMAL
KETONES UR STRIP-MCNC: NEGATIVE MG/DL
LEUKOCYTE ESTERASE UR QL STRIP: ABNORMAL
MUCOUS THREADS #/AREA URNS LPF: PRESENT /LPF
NITRATE UR QL: NEGATIVE
PH UR STRIP: 8 PH (ref 5–7)
RBC #/AREA URNS AUTO: >182 /HPF (ref 0–2)
SOURCE: ABNORMAL
SP GR UR STRIP: 1.01 (ref 1–1.03)
UROBILINOGEN UR STRIP-MCNC: NORMAL MG/DL (ref 0–2)
WBC #/AREA URNS AUTO: 29 /HPF (ref 0–5)

## 2021-06-29 PROCEDURE — 81001 URINALYSIS AUTO W/SCOPE: CPT | Performed by: FAMILY MEDICINE

## 2021-06-29 PROCEDURE — 87086 URINE CULTURE/COLONY COUNT: CPT | Performed by: FAMILY MEDICINE

## 2021-06-29 RX ORDER — NITROFURANTOIN 25; 75 MG/1; MG/1
100 CAPSULE ORAL 2 TIMES DAILY
Qty: 10 CAPSULE | Refills: 0 | Status: SHIPPED | OUTPATIENT
Start: 2021-06-29 | End: 2021-07-04

## 2021-06-29 NOTE — TELEPHONE ENCOUNTER
Gabriela DESIR from [facility] called saying she suspect patient has UTI and inquiring for order for UA-C.    Pended order, thank you.    JEN LiN, RN - Patient Advocate and Liaison (PAL)   [facility]   [phone]

## 2021-06-29 NOTE — TELEPHONE ENCOUNTER
Gabriela DESIR notified.    JEN LiN, RN - Patient Advocate and Liaison (PAL)   [facility]   [phone]

## 2021-06-30 LAB
BACTERIA SPEC CULT: NORMAL
Lab: NORMAL
SPECIMEN SOURCE: NORMAL

## 2021-07-01 DIAGNOSIS — N31.9 NEUROGENIC BLADDER: ICD-10-CM

## 2021-07-01 RX ORDER — OXYBUTYNIN CHLORIDE 15 MG/1
15 TABLET, EXTENDED RELEASE ORAL DAILY
Qty: 30 TABLET | Refills: 0 | Status: SHIPPED | OUTPATIENT
Start: 2021-07-01 | End: 2021-07-06

## 2021-07-01 NOTE — TELEPHONE ENCOUNTER
Last Clinic Visit: 5/18/20 no upcoming appointments.  30 day refill provided per protocol, routed to clinic scheduling for follow up

## 2021-07-06 RX ORDER — OXYBUTYNIN CHLORIDE 15 MG/1
15 TABLET, EXTENDED RELEASE ORAL DAILY
Qty: 90 TABLET | Refills: 0 | Status: SHIPPED | OUTPATIENT
Start: 2021-07-06 | End: 2021-07-12

## 2021-07-06 NOTE — TELEPHONE ENCOUNTER
OXYBUTYNIN ER 15MG TABLETS  Last Written Prescription Date:  7/1/2021  Last Fill Quantity: 30,   # refills: 0  Last Office Visit : 5/18/2020  Future Office visit:  7/12/2021    Routing refill request to provider for review/approval because:  Pt has office visit on 7/12/2021.   Refer to clinic for review and refills Per Providers recommendations for Pt care.       Rakel Navarro RN  Central Triage Red Flags/Med Refills

## 2021-07-07 ENCOUNTER — PRE VISIT (OUTPATIENT)
Dept: UROLOGY | Facility: CLINIC | Age: 66
End: 2021-07-07

## 2021-07-07 DIAGNOSIS — N31.9 NEUROGENIC BLADDER: Primary | ICD-10-CM

## 2021-07-07 NOTE — TELEPHONE ENCOUNTER
Reason for visit: medication refill/symptom check     Relevant information: neurogenic bladder, SPT    Records/imaging/labs/orders: orders placed for renal US    Pt called: message sent to scheduling

## 2021-07-08 ENCOUNTER — HOSPITAL ENCOUNTER (OUTPATIENT)
Dept: ULTRASOUND IMAGING | Facility: CLINIC | Age: 66
Discharge: HOME | End: 2021-07-08
Attending: UROLOGY | Admitting: UROLOGY
Payer: MEDICARE

## 2021-07-08 DIAGNOSIS — N31.9 NEUROGENIC BLADDER: ICD-10-CM

## 2021-07-08 PROCEDURE — 76770 US EXAM ABDO BACK WALL COMP: CPT

## 2021-07-12 ENCOUNTER — VIRTUAL VISIT (OUTPATIENT)
Dept: UROLOGY | Facility: CLINIC | Age: 66
End: 2021-07-12
Payer: MEDICARE

## 2021-07-12 VITALS — HEIGHT: 65 IN | WEIGHT: 220 LBS | BODY MASS INDEX: 36.65 KG/M2

## 2021-07-12 DIAGNOSIS — N31.9 NEUROGENIC BLADDER: Primary | ICD-10-CM

## 2021-07-12 PROCEDURE — 99213 OFFICE O/P EST LOW 20 MIN: CPT | Mod: 95 | Performed by: UROLOGY

## 2021-07-12 RX ORDER — OXYBUTYNIN CHLORIDE 15 MG/1
15 TABLET, EXTENDED RELEASE ORAL DAILY
Qty: 90 TABLET | Refills: 4 | Status: SHIPPED | OUTPATIENT
Start: 2021-07-12 | End: 2022-03-02

## 2021-07-12 ASSESSMENT — MIFFLIN-ST. JEOR: SCORE: 1548.79

## 2021-07-12 ASSESSMENT — PAIN SCALES - GENERAL: PAINLEVEL: MODERATE PAIN (5)

## 2021-07-12 NOTE — PROGRESS NOTES
"HPI:  Bhavani White is a 65 year old female being seen for neurogenic bladder . Neurogenic bladder due to spinal stenosis. She is managed with a suprapubic tube but had refractory urge and urge incontinence so we did Botox injection. Her last injection was Nov 2019 and she still feels well -- no urge or urge incontinence. She is taking oxybutinin 15 mg every day. No recent UTIs so has not felt like she needed botox again.   Slightly increased leakage around the SPT    Of note, had a ruptured duodenal ulcer repaired last year at [facility]    Exam:  Ht 1.651 m (5' 5\")   Wt 99.8 kg (220 lb)   BMI 36.61 kg/m    GENERAL: Healthy, alert and no distress  EYES: Eyes grossly normal to inspection.  No discharge or erythema, or obvious scleral/conjunctival abnormalities.  RESP: No audible wheeze, cough, or visible cyanosis.  No visible retractions or increased work of breathing.    SKIN: Visible skin clear. No significant rash, abnormal pigmentation or lesions.  NEURO: Cranial nerves grossly intact.  Mentation and speech appropriate for age.  PSYCH: Mentation appears normal, affect normal/bright, judgement and insight intact, normal speech and appearance well-groomed.    Review of Imaging:  The following imaging exams were independently viewed and interpreted by me and discussed with patient:  Renal/Bladder Ultrasound: Normal but an incidental finding of some ascites    Review of Labs:  The following labs were reviewed by me and discussed with the patient:  Recent Results (from the past 720 hour(s))   UA with Microscopic    Collection Time: 06/29/21  1:00 PM   Result Value Ref Range    Color Urine Light Yellow     Appearance Urine Clear     Glucose Urine Negative NEG^Negative mg/dL    Bilirubin Urine Negative NEG^Negative    Ketones Urine Negative NEG^Negative mg/dL    Specific Gravity Urine 1.013 1.003 - 1.035    Blood Urine Moderate (A) NEG^Negative    pH Urine 8.0 (H) 5.0 - 7.0 pH    Protein Albumin Urine Negative " NEG^Negative mg/dL    Urobilinogen mg/dL Normal 0.0 - 2.0 mg/dL    Nitrite Urine Negative NEG^Negative    Leukocyte Esterase Urine Moderate (A) NEG^Negative    Source Unspecified Urine     WBC Urine 29 (H) 0 - 5 /HPF    RBC Urine >182 (H) 0 - 2 /HPF    Mucous Urine Present (A) NEG^Negative /LPF   Urine Culture Aerobic Bacterial    Collection Time: 06/29/21  1:00 PM    Specimen: Unspecified Urine   Result Value Ref Range    Specimen Description Unspecified Urine     Special Requests Specimen received in preservative     Culture Micro       50,000 to 100,000 colonies/mL  mixed urogenital bruno  Susceptibility testing not routinely done           Assessment & Plan   1. neurogenic bladder doing well with current management. Does not desire more botox at this time. RTC 1 year by video visit with MYRON Dinero. Renal US prior. I refilled her meds.   2. Incidental finding of ascites on US -- encouraged hr to f/u with PCP about this.     Yoandy Rios MD  [facility]      ==========================      Additional Coding Information:    Problems:  3 -- one stable chronic illness    Data Reviewed  Review of the result(s) of each unique test - US    Level of risk:  4 -- prescription drug management      Bhavani is a 65 year old who is being evaluated via a billable video visit.      How would you like to obtain your AVS? MyChart  If the video visit is dropped, the invitation should be resent by: Text to cell phone: [phone]  Will anyone else be joining your video visit? No    Video Start Time: 10:45  Video-Visit Details    Type of service:  Video Visit    Video End Time:11:00    Originating Location (pt. Location): Long term Care    Distant Location (provider location):  [facility]     Platform used for Video Visit: Meilimei

## 2021-07-12 NOTE — LETTER
"7/12/2021       RE: Bhavani White  [address]  [facility] [address]     Dear Colleague,    Thank you for referring your patient, Bhavani White, to the [facility]OLOG[facility] [place] at [facility]. Please see a copy of my visit note below.    HPI:  Bhavani White is a 65 year old female being seen for neurogenic bladder . Neurogenic bladder due to spinal stenosis. She is managed with a suprapubic tube but had refractory urge and urge incontinence so we did Botox injection. Her last injection was Nov 2019 and she still feels well -- no urge or urge incontinence. She is taking oxybutinin 15 mg every day. No recent UTIs so has not felt like she needed botox again.   Slightly increased leakage around the SPT    Of note, had a ruptured duodenal ulcer repaired last year at [facility]    Exam:  Ht 1.651 m (5' 5\")   Wt 99.8 kg (220 lb)   BMI 36.61 kg/m    GENERAL: Healthy, alert and no distress  EYES: Eyes grossly normal to inspection.  No discharge or erythema, or obvious scleral/conjunctival abnormalities.  RESP: No audible wheeze, cough, or visible cyanosis.  No visible retractions or increased work of breathing.    SKIN: Visible skin clear. No significant rash, abnormal pigmentation or lesions.  NEURO: Cranial nerves grossly intact.  Mentation and speech appropriate for age.  PSYCH: Mentation appears normal, affect normal/bright, judgement and insight intact, normal speech and appearance well-groomed.    Review of Imaging:  The following imaging exams were independently viewed and interpreted by me and discussed with patient:  Renal/Bladder Ultrasound: Normal but an incidental finding of some ascites    Review of Labs:  The following labs were reviewed by me and discussed with the patient:  Recent Results (from the past 720 hour(s))   UA with Microscopic    Collection Time: 06/29/21  1:00 PM   Result Value Ref Range    Color Urine Light Yellow "     Appearance Urine Clear     Glucose Urine Negative NEG^Negative mg/dL    Bilirubin Urine Negative NEG^Negative    Ketones Urine Negative NEG^Negative mg/dL    Specific Gravity Urine 1.013 1.003 - 1.035    Blood Urine Moderate (A) NEG^Negative    pH Urine 8.0 (H) 5.0 - 7.0 pH    Protein Albumin Urine Negative NEG^Negative mg/dL    Urobilinogen mg/dL Normal 0.0 - 2.0 mg/dL    Nitrite Urine Negative NEG^Negative    Leukocyte Esterase Urine Moderate (A) NEG^Negative    Source Unspecified Urine     WBC Urine 29 (H) 0 - 5 /HPF    RBC Urine >182 (H) 0 - 2 /HPF    Mucous Urine Present (A) NEG^Negative /LPF   Urine Culture Aerobic Bacterial    Collection Time: 06/29/21  1:00 PM    Specimen: Unspecified Urine   Result Value Ref Range    Specimen Description Unspecified Urine     Special Requests Specimen received in preservative     Culture Micro       50,000 to 100,000 colonies/mL  mixed urogenital bruno  Susceptibility testing not routinely done           Assessment & Plan   1. neurogenic bladder doing well with current management. Does not desire more botox at this time. RTC 1 year by video visit with MYRON Dinero. Renal US prior. I refilled her meds.   2. Incidental finding of ascites on US -- encouraged hr to f/u with PCP about this.     Yoandy Rios MD  [facility]OLOG[facility] [place]      ==========================      Additional Coding Information:    Problems:  3 -- one stable chronic illness    Data Reviewed  Review of the result(s) of each unique test - US    Level of risk:  4 -- prescription drug management      Bhavani is a 65 year old who is being evaluated via a billable video visit.      How would you like to obtain your AVS? MyChart  If the video visit is dropped, the invitation should be resent by: Text to cell phone: [phone]  Will anyone else be joining your video visit? No    Video Start Time: 10:45  Video-Visit Details    Type of service:  Video Visit    Video End  Time:11:00    Originating Location (pt. Location): Long term Care    Distant Location (provider location):  [facility] UROLOGY CLIN[facility] [place]     Platform used for Video Visit: Reid

## 2021-07-13 ENCOUNTER — TELEPHONE (OUTPATIENT)
Dept: FAMILY MEDICINE | Facility: CLINIC | Age: 66
End: 2021-07-13

## 2021-07-13 NOTE — TELEPHONE ENCOUNTER
Lola (RN from [facility]), calls    Requesting continuation of care orders    Verbal orders given    -Catheter changes monthly  -PRN visits in between these monthly changes    Eloy Granger RN

## 2021-07-15 ENCOUNTER — MEDICAL CORRESPONDENCE (OUTPATIENT)
Dept: HEALTH INFORMATION MANAGEMENT | Facility: CLINIC | Age: 66
End: 2021-07-15

## 2021-07-21 ENCOUNTER — OFFICE VISIT (OUTPATIENT)
Dept: FAMILY MEDICINE | Facility: CLINIC | Age: 66
End: 2021-07-21
Payer: MEDICARE

## 2021-07-21 VITALS
WEIGHT: 241 LBS | HEART RATE: 91 BPM | TEMPERATURE: 98.6 F | DIASTOLIC BLOOD PRESSURE: 84 MMHG | OXYGEN SATURATION: 96 % | BODY MASS INDEX: 40.1 KG/M2 | SYSTOLIC BLOOD PRESSURE: 132 MMHG

## 2021-07-21 DIAGNOSIS — R18.8 OTHER ASCITES: Primary | ICD-10-CM

## 2021-07-21 PROCEDURE — 36415 COLL VENOUS BLD VENIPUNCTURE: CPT | Performed by: FAMILY MEDICINE

## 2021-07-21 PROCEDURE — 80053 COMPREHEN METABOLIC PANEL: CPT | Performed by: FAMILY MEDICINE

## 2021-07-21 PROCEDURE — 99213 OFFICE O/P EST LOW 20 MIN: CPT | Performed by: FAMILY MEDICINE

## 2021-07-21 NOTE — PROGRESS NOTES
"    Assessment & Plan     Other ascites  Will order US abdomen to quantify the ascitic fluid, will also check CMP.  - Comprehensive metabolic panel (BMP + Alb, Alk Phos, ALT, AST, Total. Bili, TP)  - US Abdomen Limited; Future    15 minutes spent on the date of the encounter doing chart review, history and exam, documentation and further activities per the note     BMI:   Estimated body mass index is 40.1 kg/m  as calculated from the following:    Height as of 7/12/21: 1.651 m (5' 5\").    Weight as of this encounter: 109.3 kg (241 lb).       See Patient Instructions    No follow-ups on file.    Sapphire Urbina MD  [facility] MARCELLA Beckham is a 65 year old who presents for the following health issues     HPI     Pt would like to talk about fluid in abdomen.    Had renal US, incidental ascites found, not quantified.  Was advised to see her PCP about this.      Review of Systems   Constitutional, HEENT, cardiovascular, pulmonary, gi and gu systems are negative, except as otherwise noted.      Objective    /84   Pulse 91   Temp 98.6  F (37  C) (Oral)   Wt 109.3 kg (241 lb)   SpO2 96%   BMI 40.10 kg/m    Body mass index is 40.1 kg/m .  Physical Exam   GENERAL: healthy, alert and no distress  ABDOMEN: soft, nontender.  Exam limited due to patient unable to get onto exam table.    Reviewed imaging          "

## 2021-07-21 NOTE — PATIENT INSTRUCTIONS
[facility],  [facility] [phone],   Toll Free: [phone]

## 2021-07-22 LAB
ALBUMIN SERPL-MCNC: 3.7 G/DL (ref 3.4–5)
ALP SERPL-CCNC: 97 U/L (ref 40–150)
ALT SERPL W P-5'-P-CCNC: 31 U/L (ref 0–50)
ANION GAP SERPL CALCULATED.3IONS-SCNC: 5 MMOL/L (ref 3–14)
AST SERPL W P-5'-P-CCNC: 23 U/L (ref 0–45)
BILIRUB SERPL-MCNC: 0.3 MG/DL (ref 0.2–1.3)
BUN SERPL-MCNC: 16 MG/DL (ref 7–30)
CALCIUM SERPL-MCNC: 10.1 MG/DL (ref 8.5–10.1)
CHLORIDE BLD-SCNC: 102 MMOL/L (ref 94–109)
CO2 SERPL-SCNC: 31 MMOL/L (ref 20–32)
CREAT SERPL-MCNC: 0.9 MG/DL (ref 0.52–1.04)
GFR SERPL CREATININE-BSD FRML MDRD: 68 ML/MIN/1.73M2
GLUCOSE BLD-MCNC: 118 MG/DL (ref 70–99)
POTASSIUM BLD-SCNC: 4.6 MMOL/L (ref 3.4–5.3)
PROT SERPL-MCNC: 7.8 G/DL (ref 6.8–8.8)
SODIUM SERPL-SCNC: 138 MMOL/L (ref 133–144)

## 2021-07-27 ENCOUNTER — TELEPHONE (OUTPATIENT)
Dept: FAMILY MEDICINE | Facility: CLINIC | Age: 66
End: 2021-07-27

## 2021-07-27 NOTE — TELEPHONE ENCOUNTER
Received a 7 page fax from [facility]. Home Health Certification and Plan of Care. Fax toDr. Coming Hay to review. Ruth Behrens.

## 2021-07-28 DIAGNOSIS — Z53.9 DIAGNOSIS NOT YET DEFINED: Primary | ICD-10-CM

## 2021-07-28 PROCEDURE — G0179 MD RECERTIFICATION HHA PT: HCPCS | Performed by: FAMILY MEDICINE

## 2021-07-29 ENCOUNTER — MEDICAL CORRESPONDENCE (OUTPATIENT)
Dept: HEALTH INFORMATION MANAGEMENT | Facility: CLINIC | Age: 66
End: 2021-07-29

## 2021-07-29 ENCOUNTER — TELEPHONE (OUTPATIENT)
Dept: FAMILY MEDICINE | Facility: CLINIC | Age: 66
End: 2021-07-29

## 2021-07-30 ENCOUNTER — HOSPITAL ENCOUNTER (OUTPATIENT)
Dept: ULTRASOUND IMAGING | Facility: CLINIC | Age: 66
Discharge: HOME | End: 2021-07-30
Attending: FAMILY MEDICINE | Admitting: FAMILY MEDICINE
Payer: MEDICARE

## 2021-07-30 DIAGNOSIS — R18.8 OTHER ASCITES: ICD-10-CM

## 2021-07-30 PROCEDURE — 76705 ECHO EXAM OF ABDOMEN: CPT

## 2021-08-04 DIAGNOSIS — N31.9 NEUROGENIC BLADDER: ICD-10-CM

## 2021-08-07 RX ORDER — OXYBUTYNIN CHLORIDE 15 MG/1
TABLET, EXTENDED RELEASE ORAL
Qty: 90 TABLET | Refills: 4 | OUTPATIENT
Start: 2021-08-07

## 2021-08-10 ENCOUNTER — TELEPHONE (OUTPATIENT)
Dept: FAMILY MEDICINE | Facility: CLINIC | Age: 66
End: 2021-08-10

## 2021-08-10 PROCEDURE — 81001 URINALYSIS AUTO W/SCOPE: CPT | Mod: ORL | Performed by: FAMILY MEDICINE

## 2021-08-10 PROCEDURE — 87088 URINE BACTERIA CULTURE: CPT | Mod: 59 | Performed by: FAMILY MEDICINE

## 2021-08-10 NOTE — TELEPHONE ENCOUNTER
Lola (RN from [facility]), calls reporting she will be submitting a UA/UC, patient has continued spasms and urinary leaking. Will inform of results.    GIOVANA Li, RN - Patient Advocate and Liaison (PAL)   [facility]   [phone]

## 2021-08-11 ENCOUNTER — LAB REQUISITION (OUTPATIENT)
Dept: LAB | Facility: CLINIC | Age: 66
End: 2021-08-11
Payer: MEDICARE

## 2021-08-11 DIAGNOSIS — N39.0 URINARY TRACT INFECTION WITH HEMATURIA, SITE UNSPECIFIED: Primary | ICD-10-CM

## 2021-08-11 DIAGNOSIS — R31.9 URINARY TRACT INFECTION WITH HEMATURIA, SITE UNSPECIFIED: Primary | ICD-10-CM

## 2021-08-11 LAB
ALBUMIN UR-MCNC: NEGATIVE MG/DL
APPEARANCE UR: CLEAR
BILIRUB UR QL STRIP: NEGATIVE
COLOR UR AUTO: ABNORMAL
GLUCOSE UR STRIP-MCNC: NEGATIVE MG/DL
HGB UR QL STRIP: NEGATIVE
KETONES UR STRIP-MCNC: NEGATIVE MG/DL
LEUKOCYTE ESTERASE UR QL STRIP: ABNORMAL
MUCOUS THREADS #/AREA URNS LPF: PRESENT /LPF
NITRATE UR QL: NEGATIVE
PH UR STRIP: 7 [PH] (ref 5–7)
RBC URINE: 3 /HPF
SP GR UR STRIP: 1.01 (ref 1–1.03)
UROBILINOGEN UR STRIP-MCNC: NORMAL MG/DL
WBC URINE: 68 /HPF

## 2021-08-11 RX ORDER — SULFAMETHOXAZOLE/TRIMETHOPRIM 800-160 MG
1 TABLET ORAL 2 TIMES DAILY
Qty: 6 TABLET | Refills: 0 | Status: SHIPPED | OUTPATIENT
Start: 2021-08-11 | End: 2021-08-14

## 2021-08-12 LAB
BACTERIA UR CULT: ABNORMAL

## 2021-08-13 ENCOUNTER — TELEPHONE (OUTPATIENT)
Dept: FAMILY MEDICINE | Facility: CLINIC | Age: 66
End: 2021-08-13

## 2021-08-13 DIAGNOSIS — E78.5 HYPERLIPIDEMIA LDL GOAL <100: ICD-10-CM

## 2021-08-13 NOTE — TELEPHONE ENCOUNTER
Patient Quality Outreach Summary      Summary:    Patient is due/failing the following:   Breast Cancer Screening - Mammogram    Type of outreach:    Sent letter. and Phone not accepting any calls, letter sent.    Questions for provider review:    None                                                                                                                    Analisa Mata MA  [facility].         Chart routed to none.

## 2021-08-13 NOTE — LETTER
[address] [address] [phone]  August 13, 2021    Bhavani White  [address]  [place] [facility] [place] [address]    Dear Bhavani,    I care about your health and have reviewed your health plan. I have reviewed your medical conditions, medication list, and lab results and am making recommendations based on this review, to better manage your health.    You are in particular need of attention regarding:  -Breast Cancer Screening    I am recommending that you:  {recommendations:-schedule a MAMMOGRAM which is due. We have mammogram available at our clinic; please call [phone].   Please disregard this reminder if you have had this exam elsewhere within the last year.  It would be helpful for us to have a copy of your mammogram report in our file so that we can best coordinate your care.    Here is a list of Health Maintenance topics that are due now or due soon:  Health Maintenance Due   Topic Date Due     HF ACTION PLAN  Never done     MEDICARE ANNUAL WELLNESS VISIT  Never done     ZOSTER IMMUNIZATION (1 of 2) Never done     DIABETIC FOOT EXAM  10/27/2018     MAMMO SCREENING  05/01/2019     LIPID  05/01/2021     EYE EXAM  08/28/2021       Please call us at [phone] (or use FaceOn Mobile) to address the above recommendations.     Thank you for trusting [facility] and we appreciate the opportunity to serve you.  We look forward to supporting your healthcare needs in the future.    Healthy Regards,    COMING LI LOMBARDI MD/alcides

## 2021-08-16 ENCOUNTER — TELEPHONE (OUTPATIENT)
Dept: FAMILY MEDICINE | Facility: CLINIC | Age: 66
End: 2021-08-16

## 2021-08-16 RX ORDER — ROSUVASTATIN CALCIUM 5 MG/1
TABLET, COATED ORAL
Qty: 90 TABLET | Refills: 0 | Status: SHIPPED | OUTPATIENT
Start: 2021-08-16 | End: 2022-03-02

## 2021-08-16 NOTE — TELEPHONE ENCOUNTER
----- Message from Magdiel Cesar PA-C sent at 8/16/2021  3:37 PM CDT -----  Please call patient. Looks like patient was having symptoms last week and UA was submitted per pcp. However it appears it was contaminated prior to running the lab. Is she still having symptoms? We may need to repeat the UA at a lab only. If severe symptoms however, would recommend OV. Urgent would be fine.     -ness cesar, pac

## 2021-08-16 NOTE — TELEPHONE ENCOUNTER
Prescription approved per [facility] Refill Protocol.  KAITY refill. Needs appointment.     LDL Cholesterol Calculated   Date Value Ref Range Status   05/01/2020 44 <100 mg/dL Final     Comment:     Desirable:       <100 mg/dl    Lola Wynn RN

## 2021-08-16 NOTE — TELEPHONE ENCOUNTER
LM on VM to call this RN back if she should call the main clinic please forward to me [phone]  Kaila Denise RN

## 2021-08-18 NOTE — TELEPHONE ENCOUNTER
Attempt #3 to call to pt.    LM on VM to call this RN back if she should call the main clinic please forward to me [phone]  Kaila Denise RN,

## 2021-08-25 DIAGNOSIS — J45.30 MILD PERSISTENT ASTHMA WITHOUT COMPLICATION: ICD-10-CM

## 2021-08-26 RX ORDER — ALBUTEROL SULFATE 90 UG/1
AEROSOL, METERED RESPIRATORY (INHALATION)
Qty: 18 G | Refills: 1 | Status: SHIPPED | OUTPATIENT
Start: 2021-08-26 | End: 2021-10-08

## 2021-08-31 NOTE — TELEPHONE ENCOUNTER
Prescription approved per [facility] Refill Protocol.  Ernst eZlaya RN, BSN     Bcc Pigmented Histology Text: The dermis contains distinctive tumor cell masses of various shapes and sizes composed of cells with large oval or elongated nuclei with relatively little cytoplasm.  The nuclei are homogenous.  There is no pronounced variation in size or intensity of staining and no significant anaplastic appearance.  The cells at the outer aspect of the tumor masses show palisading of nuclei.  Tumor masses are surrounded by a connective tissue stroma and in some areas there is retraction artifact of the tumor nodule away from the surrounding stroma.  Pigmented tumor cells are noted.

## 2021-09-13 ENCOUNTER — MEDICAL CORRESPONDENCE (OUTPATIENT)
Dept: HEALTH INFORMATION MANAGEMENT | Facility: CLINIC | Age: 66
End: 2021-09-13

## 2021-09-13 ENCOUNTER — TELEPHONE (OUTPATIENT)
Dept: FAMILY MEDICINE | Facility: CLINIC | Age: 66
End: 2021-09-13

## 2021-09-13 NOTE — TELEPHONE ENCOUNTER
Lola RN calling from [facility] requesting verbal orders for:     1x monthly skilled nurse visits with 3 PRN for catheter changes for next 60 days.     Verbal ok given, routing to PCP to sign off.     Direct line for call back to : [phone]    Alfonso VEGA RN

## 2021-09-13 NOTE — TELEPHONE ENCOUNTER
Encounter closed, no action needed, home care will fax forms  Ekta Mccarthy RN, BSN  Message handled by CLINIC NURSE.

## 2021-09-14 ENCOUNTER — MEDICAL CORRESPONDENCE (OUTPATIENT)
Dept: HEALTH INFORMATION MANAGEMENT | Facility: CLINIC | Age: 66
End: 2021-09-14
Payer: MEDICARE

## 2021-09-15 ENCOUNTER — TELEPHONE (OUTPATIENT)
Dept: UROLOGY | Facility: CLINIC | Age: 66
End: 2021-09-15

## 2021-09-20 ENCOUNTER — ALLIED HEALTH/NURSE VISIT (OUTPATIENT)
Dept: FAMILY MEDICINE | Facility: CLINIC | Age: 66
End: 2021-09-20
Payer: MEDICARE

## 2021-09-20 DIAGNOSIS — Z53.9 DIAGNOSIS NOT YET DEFINED: Primary | ICD-10-CM

## 2021-09-20 DIAGNOSIS — Z23 NEED FOR PROPHYLACTIC VACCINATION AND INOCULATION AGAINST INFLUENZA: Primary | ICD-10-CM

## 2021-09-20 PROCEDURE — 99207 PR NO CHARGE NURSE ONLY: CPT

## 2021-09-20 PROCEDURE — 90682 RIV4 VACC RECOMBINANT DNA IM: CPT

## 2021-09-20 PROCEDURE — G0179 MD RECERTIFICATION HHA PT: HCPCS | Performed by: FAMILY MEDICINE

## 2021-09-20 PROCEDURE — G0008 ADMIN INFLUENZA VIRUS VAC: HCPCS

## 2021-09-20 ASSESSMENT — ANXIETY QUESTIONNAIRES
6. BECOMING EASILY ANNOYED OR IRRITABLE: SEVERAL DAYS
7. FEELING AFRAID AS IF SOMETHING AWFUL MIGHT HAPPEN: NOT AT ALL
3. WORRYING TOO MUCH ABOUT DIFFERENT THINGS: NOT AT ALL
2. NOT BEING ABLE TO STOP OR CONTROL WORRYING: NOT AT ALL
GAD7 TOTAL SCORE: 1
IF YOU CHECKED OFF ANY PROBLEMS ON THIS QUESTIONNAIRE, HOW DIFFICULT HAVE THESE PROBLEMS MADE IT FOR YOU TO DO YOUR WORK, TAKE CARE OF THINGS AT HOME, OR GET ALONG WITH OTHER PEOPLE: NOT DIFFICULT AT ALL
1. FEELING NERVOUS, ANXIOUS, OR ON EDGE: NOT AT ALL
5. BEING SO RESTLESS THAT IT IS HARD TO SIT STILL: NOT AT ALL

## 2021-09-20 ASSESSMENT — ASTHMA QUESTIONNAIRES
QUESTION_1 LAST FOUR WEEKS HOW MUCH OF THE TIME DID YOUR ASTHMA KEEP YOU FROM GETTING AS MUCH DONE AT WORK, SCHOOL OR AT HOME: SOME OF THE TIME
QUESTION_2 LAST FOUR WEEKS HOW OFTEN HAVE YOU HAD SHORTNESS OF BREATH: MORE THAN ONCE A DAY
QUESTION_4 LAST FOUR WEEKS HOW OFTEN HAVE YOU USED YOUR RESCUE INHALER OR NEBULIZER MEDICATION (SUCH AS ALBUTEROL): THREE OR MORE TIMES PER DAY
QUESTION_3 LAST FOUR WEEKS HOW OFTEN DID YOUR ASTHMA SYMPTOMS (WHEEZING, COUGHING, SHORTNESS OF BREATH, CHEST TIGHTNESS OR PAIN) WAKE YOU UP AT NIGHT OR EARLIER THAN USUAL IN THE MORNING: FOUR OR MORE NIGHTS A WEEK
ACT_TOTALSCORE: 9
QUESTION_5 LAST FOUR WEEKS HOW WOULD YOU RATE YOUR ASTHMA CONTROL: SOMEWHAT CONTROLLED

## 2021-09-20 ASSESSMENT — PATIENT HEALTH QUESTIONNAIRE - PHQ9
SUM OF ALL RESPONSES TO PHQ QUESTIONS 1-9: 0
5. POOR APPETITE OR OVEREATING: NOT AT ALL

## 2021-09-21 ASSESSMENT — ASTHMA QUESTIONNAIRES: ACT_TOTALSCORE: 9

## 2021-09-21 ASSESSMENT — ANXIETY QUESTIONNAIRES: GAD7 TOTAL SCORE: 1

## 2021-10-07 DIAGNOSIS — J45.30 MILD PERSISTENT ASTHMA WITHOUT COMPLICATION: ICD-10-CM

## 2021-10-08 RX ORDER — ALBUTEROL SULFATE 90 UG/1
AEROSOL, METERED RESPIRATORY (INHALATION)
Qty: 18 G | Refills: 0 | Status: SHIPPED | OUTPATIENT
Start: 2021-10-08 | End: 2021-11-29

## 2021-10-08 NOTE — TELEPHONE ENCOUNTER
Routing refill request to provider for review/approval because:  ACT scores out of range.  Pt last seen by Dr. Sapphire Urbina MD 7/21/2021.    Gina Negron, Registered Nurse, PAL (Patient Advocate Liaison)   [facility]     [phone]

## 2021-10-12 NOTE — TELEPHONE ENCOUNTER
Contacted patient and states will call back to schedule an appt    Saba Lo/LORENZA  [place]---[facility]

## 2021-10-23 ENCOUNTER — HEALTH MAINTENANCE LETTER (OUTPATIENT)
Age: 66
End: 2021-10-23

## 2021-11-04 ENCOUNTER — TELEPHONE (OUTPATIENT)
Dept: FAMILY MEDICINE | Facility: CLINIC | Age: 66
End: 2021-11-04

## 2021-11-04 NOTE — TELEPHONE ENCOUNTER
Summary:    Patient is due/failing the following:   Eye exam    Reviewed:  [] CARE EVERYWHERE  [] LAST OV NOTE INCLUDING ENDO  [] FYI TAB  [] MYCHART ACTIVE?  [] LAST PANEL ENCOUNTER  [] FUTURE APPTS  [] IMMUNIZATIONS          Action needed:   Patient needs office visit for eye exam.    Type of outreach:    contacted [facility] and they state last exam 08/28/2020                                                                               Saba Lo/LORENZA  [place]---[facility]

## 2021-11-05 DIAGNOSIS — E03.4 HYPOTHYROIDISM DUE TO ACQUIRED ATROPHY OF THYROID: ICD-10-CM

## 2021-11-05 RX ORDER — LEVOTHYROXINE SODIUM 88 UG/1
TABLET ORAL
Qty: 90 TABLET | Refills: 2 | OUTPATIENT
Start: 2021-11-05

## 2021-11-10 ENCOUNTER — TELEPHONE (OUTPATIENT)
Dept: FAMILY MEDICINE | Facility: CLINIC | Age: 66
End: 2021-11-10
Payer: MEDICARE

## 2021-11-10 NOTE — TELEPHONE ENCOUNTER
Patients recertification was today and would like a call back regarding catheter changes.  Here is the nurse info whom called regarding this patient.     [phone] Lola; RN with [facility]    Thank you,    Melanie; Scheduling

## 2021-11-11 DIAGNOSIS — E11.21 TYPE 2 DIABETES MELLITUS WITH DIABETIC NEPHROPATHY, WITHOUT LONG-TERM CURRENT USE OF INSULIN (H): ICD-10-CM

## 2021-11-12 RX ORDER — METFORMIN HCL 500 MG
TABLET, EXTENDED RELEASE 24 HR ORAL
Qty: 180 TABLET | Refills: 0 | Status: SHIPPED | OUTPATIENT
Start: 2021-11-12 | End: 2022-03-02

## 2021-11-12 NOTE — TELEPHONE ENCOUNTER
Refill extended, pt has appointment scheduled  Prescription approved per [facility] Refill Protocol.  Ekta Mccarthy RN, BSN  Message handled by CLINIC NURSE.

## 2021-11-13 ENCOUNTER — MEDICAL CORRESPONDENCE (OUTPATIENT)
Dept: HEALTH INFORMATION MANAGEMENT | Facility: CLINIC | Age: 66
End: 2021-11-13
Payer: MEDICARE

## 2021-11-18 DIAGNOSIS — Z53.9 DIAGNOSIS NOT YET DEFINED: Primary | ICD-10-CM

## 2021-11-18 PROCEDURE — G0179 MD RECERTIFICATION HHA PT: HCPCS | Performed by: FAMILY MEDICINE

## 2021-11-20 ENCOUNTER — MEDICAL CORRESPONDENCE (OUTPATIENT)
Dept: HEALTH INFORMATION MANAGEMENT | Facility: CLINIC | Age: 66
End: 2021-11-20
Payer: MEDICARE

## 2021-11-28 ASSESSMENT — ANXIETY QUESTIONNAIRES
3. WORRYING TOO MUCH ABOUT DIFFERENT THINGS: NOT AT ALL
7. FEELING AFRAID AS IF SOMETHING AWFUL MIGHT HAPPEN: NOT AT ALL
2. NOT BEING ABLE TO STOP OR CONTROL WORRYING: NOT AT ALL
7. FEELING AFRAID AS IF SOMETHING AWFUL MIGHT HAPPEN: NOT AT ALL
6. BECOMING EASILY ANNOYED OR IRRITABLE: SEVERAL DAYS
1. FEELING NERVOUS, ANXIOUS, OR ON EDGE: NOT AT ALL
GAD7 TOTAL SCORE: 1
5. BEING SO RESTLESS THAT IT IS HARD TO SIT STILL: NOT AT ALL
4. TROUBLE RELAXING: NOT AT ALL
GAD7 TOTAL SCORE: 1
GAD7 TOTAL SCORE: 1
8. IF YOU CHECKED OFF ANY PROBLEMS, HOW DIFFICULT HAVE THESE MADE IT FOR YOU TO DO YOUR WORK, TAKE CARE OF THINGS AT HOME, OR GET ALONG WITH OTHER PEOPLE?: NOT DIFFICULT AT ALL

## 2021-11-29 ENCOUNTER — VIRTUAL VISIT (OUTPATIENT)
Dept: FAMILY MEDICINE | Facility: CLINIC | Age: 66
End: 2021-11-29
Payer: MEDICARE

## 2021-11-29 DIAGNOSIS — N18.2 CHRONIC KIDNEY DISEASE, STAGE 2 (MILD): ICD-10-CM

## 2021-11-29 DIAGNOSIS — E66.01 MORBID OBESITY, UNSPECIFIED OBESITY TYPE (H): ICD-10-CM

## 2021-11-29 DIAGNOSIS — J45.30 MILD PERSISTENT ASTHMA WITHOUT COMPLICATION: Primary | ICD-10-CM

## 2021-11-29 DIAGNOSIS — E44.1 MILD PROTEIN-CALORIE MALNUTRITION (H): ICD-10-CM

## 2021-11-29 DIAGNOSIS — F33.42 RECURRENT MAJOR DEPRESSIVE DISORDER, IN FULL REMISSION (H): ICD-10-CM

## 2021-11-29 PROBLEM — Z86.14 HISTORY OF MRSA INFECTION: Chronic | Status: RESOLVED | Noted: 2020-02-01 | Resolved: 2021-11-29

## 2021-11-29 PROCEDURE — 99213 OFFICE O/P EST LOW 20 MIN: CPT | Mod: 95 | Performed by: FAMILY MEDICINE

## 2021-11-29 RX ORDER — BUDESONIDE AND FORMOTEROL FUMARATE DIHYDRATE 80; 4.5 UG/1; UG/1
2 AEROSOL RESPIRATORY (INHALATION) 2 TIMES DAILY
Qty: 10.2 G | Refills: 5 | Status: SHIPPED | OUTPATIENT
Start: 2021-11-29 | End: 2021-12-01 | Stop reason: ALTCHOICE

## 2021-11-29 RX ORDER — ALBUTEROL SULFATE 90 UG/1
AEROSOL, METERED RESPIRATORY (INHALATION)
Qty: 18 G | Refills: 2 | Status: SHIPPED | OUTPATIENT
Start: 2021-11-29 | End: 2022-03-17

## 2021-11-29 RX ORDER — KETOCONAZOLE 20 MG/ML
SHAMPOO TOPICAL
COMMUNITY
Start: 2021-07-20 | End: 2023-01-01

## 2021-11-29 ASSESSMENT — ANXIETY QUESTIONNAIRES: GAD7 TOTAL SCORE: 1

## 2021-11-29 ASSESSMENT — PATIENT HEALTH QUESTIONNAIRE - PHQ9: SUM OF ALL RESPONSES TO PHQ QUESTIONS 1-9: 4

## 2021-11-29 NOTE — PROGRESS NOTES
"Bhavani is a 66 year old who is being evaluated via a billable video visit.      How would you like to obtain your AVS? MyChart  If the video visit is dropped, the invitation should be resent by: Text to cell phone: [phone]  Will anyone else be joining your video visit? No    Video Start Time: 3:18 PM    Assessment & Plan     Mild persistent asthma without complication  Will start patient on inhaled corticosteroid and follow up in 4 weeks or sooner as needed.  Refill albuterol.  - budesonide-formoterol (SYMBICORT) 80-4.5 MCG/ACT Inhaler; Inhale 2 puffs into the lungs 2 times daily  - VENTOLIN  (90 Base) MCG/ACT inhaler; INHALE 2 PUFFS INTO THE LUNGS EVERY 6 HOURS AS NEEDED FOR SHORTNESS OF BREATH OR DIFFICULT BREATHING    Chronic kidney disease, stage 2 (mild)  Noted on labs, continue to monitor.    Mild protein-calorie malnutrition (H)  Resolved    Recurrent major depressive disorder, in full remission (H)  Stable, well controlled.    Morbid obesity, unspecified obesity type (H)  Persistent.  Complicated by patient paraplegia and being wheelchair bound.  Monitor.      22 minutes spent on the date of the encounter doing chart review, history and exam, documentation and further activities per the note       BMI:   Estimated body mass index is 40.1 kg/m  as calculated from the following:    Height as of 7/12/21: 1.651 m (5' 5\").    Weight as of 7/21/21: 109.3 kg (241 lb).       See Patient Instructions    Return in about 4 weeks (around 12/27/2021) for Asthma Recheck.    Sapphire Urbina MD  [facility]    Kenyatta Beckham is a 66 year old who presents for the following health issues     History of Present Illness     Asthma:  She presents for follow up of asthma.  She has no cough, some wheezing, and some shortness of breath. She is using a relief medication every 4 hours. She does not miss any doses of her controller medication throughout the week.Patient is aware of the " following triggers: cold air, humidity and smoke. The patient has not had a visit to the Emergency Room, Urgent Care or Hospital due to asthma since the last clinic visit.         Visit today to discuss asthma.  In the last month or two has been using her inhaler up to every four hours as needed. ACT 10, no exacerbation today.    No additional coughing or sputum production, just SOB that needs the inhaler.   says she wheezes at night.    Of note, malnutriton resolved, had tube feedings after her duodenal perforation.    Current Outpatient Medications   Medication Sig Dispense Refill     albuterol (PROVENTIL) (2.5 MG/3ML) 0.083% neb solution USE 1 VIAL VIA NEBULIZATION EVERY 4 HOURS AS NEEDED 75 mL 1     aspirin 81 MG EC tablet Take 81 mg by mouth daily       bisacodyl (DULCOLAX) 10 MG suppository Place 10 mg rectally 2 times daily as needed for constipation       budesonide-formoterol (SYMBICORT) 80-4.5 MCG/ACT Inhaler Inhale 2 puffs into the lungs 2 times daily 10.2 g 5     desonide (DESOWEN) 0.05 % external ointment Apply topically 2 times daily 60 g 1     DULoxetine (CYMBALTA) 60 MG capsule Take 120 mg by mouth every morning       gabapentin (NEURONTIN) 800 MG tablet Take 1,600 mg by mouth 3 times daily 0800, 1200 and 1800       hydrOXYzine (ATARAX) 50 MG tablet 50 mg 2 times daily as needed        hypromellose (ARTIFICIAL TEARS) 0.5 % SOLN ophthalmic solution 1 drop every hour as needed       ipratropium - albuterol 0.5 mg/2.5 mg/3 mL (DUONEB) 0.5-2.5 (3) MG/3ML neb solution Take 1 vial by nebulization every 6 hours as needed for shortness of breath / dyspnea or wheezing       levothyroxine (SYNTHROID/LEVOTHROID) 88 MCG tablet Take 1 tablet (88 mcg) by mouth daily 90 tablet 2     metFORMIN (GLUCOPHAGE-XR) 500 MG 24 hr tablet TAKE 1 TABLET(500 MG) BY MOUTH TWICE DAILY WITH MEALS 180 tablet 0     methadone (DOLOPHINE) 10 MG tablet Take 2 tablets (20 mg) by mouth 2 times daily 20 tablet 0     miconazole  (MICATIN/MICRO GUARD) 2 % external powder Apply topically 2 times daily as needed for other (topical candidiasis)  0     multivitamin, therapeutic (THERA-VIT) TABS tablet Take 1 tablet by mouth daily       ondansetron (ZOFRAN-ODT) 4 MG ODT tab Take 1 tablet (4 mg) by mouth every 6 hours as needed for nausea or vomiting       oxybutynin ER (DITROPAN XL) 15 MG 24 hr tablet Take 1 tablet (15 mg) by mouth daily 90 tablet 4     pantoprazole (PROTONIX) 40 MG EC tablet TAKE 1 TABLET(40 MG) BY MOUTH DAILY 90 tablet 3     polyethylene glycol (MIRALAX/GLYCOLAX) packet Take 17 g by mouth 2 times daily as needed (constipation)  0     rosuvastatin (CRESTOR) 5 MG tablet TAKE 1 TABLET(5 MG) BY MOUTH AT BEDTIME 90 tablet 0     senna-docusate (SENOKOT-S/PERICOLACE) 8.6-50 MG tablet Take 2 tablets by mouth 2 times daily as needed for constipation 100 tablet 0     tiZANidine (ZANAFLEX) 4 MG tablet Take 4 mg by mouth 3 times daily as needed for muscle spasms        traZODone (DESYREL) 100 MG tablet Take 150 mg by mouth At Bedtime       VENTOLIN  (90 Base) MCG/ACT inhaler INHALE 2 PUFFS INTO THE LUNGS EVERY 6 HOURS AS NEEDED FOR SHORTNESS OF BREATH OR DIFFICULT BREATHING 18 g 2     ketoconazole (NIZORAL) 2 % external shampoo              Review of Systems   Constitutional, HEENT, cardiovascular, pulmonary, gi and gu systems are negative, except as otherwise noted.      Objective    Vitals - Patient Reported  Weight (Patient Reported): 104.3 kg (230 lb)      Vitals:  No vitals were obtained today due to virtual visit.    Physical Exam   GENERAL: Healthy, alert and no distress  EYES: Eyes grossly normal to inspection.  No discharge or erythema, or obvious scleral/conjunctival abnormalities.  RESP: No audible wheeze, cough, or visible cyanosis.  No visible retractions or increased work of breathing.    SKIN: Visible skin clear. No significant rash, abnormal pigmentation or lesions.  NEURO: Cranial nerves grossly intact.  Mentation  and speech appropriate for age.  PSYCH: Mentation appears normal, affect normal/bright, judgement and insight intact, normal speech and appearance well-groomed.            Video-Visit Details    Type of service:  Video Visit    Video End Time:3:30 PM    Originating Location (pt. Location): Home    Distant Location (provider location):  [facility] [place]     Platform used for Video Visit: OncoPep  Answers for HPI/ROS submitted by the patient on 11/28/2021  If you checked off any problems, how difficult have these problems made it for you to do your work, take care of things at home, or get along with other people?: Somewhat difficult  PHQ9 TOTAL SCORE: 4  JEOVANNY 7 TOTAL SCORE: 1

## 2021-11-30 ENCOUNTER — TELEPHONE (OUTPATIENT)
Dept: FAMILY MEDICINE | Facility: CLINIC | Age: 66
End: 2021-11-30
Payer: MEDICARE

## 2021-11-30 DIAGNOSIS — J45.30 MILD PERSISTENT ASTHMA WITHOUT COMPLICATION: Primary | ICD-10-CM

## 2021-11-30 ASSESSMENT — ASTHMA QUESTIONNAIRES: ACT_TOTALSCORE: 10

## 2021-11-30 NOTE — TELEPHONE ENCOUNTER
No history PA for this new med. Rationale for PA or alternate Rx?     Prior Authorization Retail Medication Request    Medication/Dose: Symbicort 80/4.5mcg  Previously Tried and Failed:   Rationale:     Insurance Phone:  [phone]  Insurance ID:  [id number]  Sumit Laws RN

## 2021-11-30 NOTE — TELEPHONE ENCOUNTER
Received a 1 page fax from [employer]. Inhalation Detailed Written Order. Albuterol. Form to Dr. Cecilia Urbina to review. Ruth Behrens.

## 2021-12-01 NOTE — TELEPHONE ENCOUNTER
Patient informed.   We faxed message back to [facility]: No PA for Symbicort, [facility] changed Rx to Advair 12/1/21.   Sumit Laws RN

## 2021-12-02 ENCOUNTER — TELEPHONE (OUTPATIENT)
Dept: FAMILY MEDICINE | Facility: CLINIC | Age: 66
End: 2021-12-02
Payer: MEDICARE

## 2021-12-02 DIAGNOSIS — J45.30 MILD PERSISTENT ASTHMA WITHOUT COMPLICATION: Primary | ICD-10-CM

## 2021-12-02 NOTE — TELEPHONE ENCOUNTER
"Pharmacy Benefit Information:    \"BREO IS PREFERRED, advair rejected\"    Provider: [facility]  Phone #: [phone]  ID#: [id number]  Medication/SIG: flut/salm 100/50  Pharmacy: [facility]    Routed to [facility], please advise RX CHANGE OR PA, route to [facility] PA pool or inform pharmacy of plan    Ekta Mccarthy RN, BSN  Message handled by CLINIC NURSE.    "

## 2021-12-13 ENCOUNTER — MEDICAL CORRESPONDENCE (OUTPATIENT)
Dept: HEALTH INFORMATION MANAGEMENT | Facility: CLINIC | Age: 66
End: 2021-12-13
Payer: MEDICARE

## 2021-12-14 ENCOUNTER — MYC MEDICAL ADVICE (OUTPATIENT)
Dept: FAMILY MEDICINE | Facility: CLINIC | Age: 66
End: 2021-12-14
Payer: MEDICARE

## 2021-12-14 NOTE — TELEPHONE ENCOUNTER
Message left for patient to return call to triage nurse     See my chart message     Wendy Pearson, Registered Nurse  [facility]

## 2021-12-18 ENCOUNTER — HEALTH MAINTENANCE LETTER (OUTPATIENT)
Age: 66
End: 2021-12-18

## 2022-01-04 NOTE — PROGRESS NOTES
HISTORY OF PRESENT ILLNESS:    This is a 66 year old female who follows at [facility]  Her past medical history includes:  Stress-induced cardiomyopathy, hypertension, chronic diastolic heart failure,  hyperlipidemia, type II diabetes, untreated sleep apnea, morbid obesity, neurogenic bladder with indwelling nguyen, chronic pain syndrome on Methadone, stage II CKD, depression, and asthma.    Ms White was hospitalized (12/2019) after she suffered a perforated duodenal ulcer and was quite sick with septic shock and respiratory failure. She suffered a type II non-STEMI and heart failure.   Her echo then showed LVEF 30% with tako-tsubo like regional wall motion abnormalities, moderate RV dysfunction, and no significant valvular pathology.  Prior to discharge, her ECHO was repeated and showed that her LVEF improved to 60% and her RV function normalized also.     Unfortunately, she suffered recurrent abdominal abscesses and sepsis and was admitted a few times after her initial duodenal perforation.  Her ECHO was repeated (3/2020) showing LVEF 60%, grade II diastolic dysfunction, moderate RV dysfunction.      cMRI (5/2020) showed LVEF 67% with moderate mid to apical inferior hypokinesis, overall normal RV function  The stress portion showed a small fixed apical, inferior defect consistent with a small infarction in the distal RCA without significant obed-infarct ischemia.    She was hospitalized (6/2020) for a small bowel obstruction which was treated conservatively.    I reviewed recent Epic records.  Due to her ongoing urinary issues and intermittent UTIs, a renal ultrasound was performed  A small amount of ascites was incidentally noted     Our visit today is for annual review    Ms White overall has been stable the past 9 months  She denies any chest pain or significant shortness of breath  She mainly uses the wheel chair to get around  She denies any palpitations, orthopnea, or peripheral edema.  Unfortunately, she cannot tolerate CPAP and has tried multiple masks over the years.        VITAL SIGNS:  BP: 138/82  Pulse: 80  Weight: not done    IMPRESSION AND PLAN:    Coronary Artery Disease:  -s/p NSTEMI in setting of septic shock (12/2019)  -cMRI (5/2020) showed concern for prior small infarct in distal RCA without significant obed-infarct ischemia  -denies angina  -ASA 81 mg    Resolved Stress-Induced Cardiomyopathy (2019)  -Last echo (3/2020) showed LVEF 60% with moderate RV dysfunction  -refuses CPAP  -no signs and symptoms of heart failure  -consider repeat ECHO next year    Mixed Hyperlipidemia:  -on Crestor 5 mg  -will get lipids today  She has already eaten so they will be non-fasting.     Type II Diabetes:  -Hgb A1C 5.8%    The total time for the visit today was 30 minutes which includes patient visit, reviewing of records, discussion, and placing of orders of the outpatient coordination of cardiovascular care as described.  The level of medical decision making during this visit was of moderate complexity.  Thank you for allowing me to participate in their care.    Orders Placed This Encounter   Procedures     Hemoglobin     Basic metabolic panel     Lipid Profile     ALT     Follow-Up with Cardiologist       No orders of the defined types were placed in this encounter.      There are no discontinued medications.      Encounter Diagnoses   Name Primary?     Stress-induced cardiomyopathy Yes     Hyperlipidemia LDL goal <70        CURRENT MEDICATIONS:  Current Outpatient Medications   Medication Sig Dispense Refill     aspirin 81 MG EC tablet Take 81 mg by mouth daily       bisacodyl (DULCOLAX) 10 MG suppository Place 10 mg rectally 2 times daily as needed for constipation       desonide (DESOWEN) 0.05 % external ointment Apply topically 2 times daily 60 g 1     DULoxetine (CYMBALTA) 60 MG capsule Take 120 mg by mouth every morning       fluticasone-vilanterol (BREO ELLIPTA) 100-25 MCG/INH inhaler  Inhale 1 puff into the lungs daily 28 each 5     gabapentin (NEURONTIN) 800 MG tablet Take 1,600 mg by mouth 3 times daily 0800, 1200 and 1800       hydrOXYzine (ATARAX) 50 MG tablet 50 mg 2 times daily as needed        hypromellose (ARTIFICIAL TEARS) 0.5 % SOLN ophthalmic solution 1 drop every hour as needed       ipratropium - albuterol 0.5 mg/2.5 mg/3 mL (DUONEB) 0.5-2.5 (3) MG/3ML neb solution Take 1 vial by nebulization every 6 hours as needed for shortness of breath / dyspnea or wheezing       ketoconazole (NIZORAL) 2 % external shampoo        levothyroxine (SYNTHROID/LEVOTHROID) 88 MCG tablet Take 1 tablet (88 mcg) by mouth daily 90 tablet 2     metFORMIN (GLUCOPHAGE-XR) 500 MG 24 hr tablet TAKE 1 TABLET(500 MG) BY MOUTH TWICE DAILY WITH MEALS 180 tablet 0     methadone (DOLOPHINE) 10 MG tablet Take 2 tablets (20 mg) by mouth 2 times daily 20 tablet 0     miconazole (MICATIN/MICRO GUARD) 2 % external powder Apply topically 2 times daily as needed for other (topical candidiasis)  0     multivitamin, therapeutic (THERA-VIT) TABS tablet Take 1 tablet by mouth daily       ondansetron (ZOFRAN-ODT) 4 MG ODT tab Take 1 tablet (4 mg) by mouth every 6 hours as needed for nausea or vomiting       oxybutynin ER (DITROPAN XL) 15 MG 24 hr tablet Take 1 tablet (15 mg) by mouth daily 90 tablet 4     pantoprazole (PROTONIX) 40 MG EC tablet TAKE 1 TABLET(40 MG) BY MOUTH DAILY 90 tablet 3     polyethylene glycol (MIRALAX/GLYCOLAX) packet Take 17 g by mouth 2 times daily as needed (constipation)  0     rosuvastatin (CRESTOR) 5 MG tablet TAKE 1 TABLET(5 MG) BY MOUTH AT BEDTIME 90 tablet 0     senna-docusate (SENOKOT-S/PERICOLACE) 8.6-50 MG tablet Take 2 tablets by mouth 2 times daily as needed for constipation 100 tablet 0     tiZANidine (ZANAFLEX) 4 MG tablet Take 4 mg by mouth 3 times daily as needed for muscle spasms        traZODone (DESYREL) 100 MG tablet Take 150 mg by mouth At Bedtime       VENTOLIN  (90 Base)  "MCG/ACT inhaler INHALE 2 PUFFS INTO THE LUNGS EVERY 6 HOURS AS NEEDED FOR SHORTNESS OF BREATH OR DIFFICULT BREATHING 18 g 2     albuterol (PROVENTIL) (2.5 MG/3ML) 0.083% neb solution USE 1 VIAL VIA NEBULIZATION EVERY 4 HOURS AS NEEDED 75 mL 1       ALLERGIES     Allergies   Allergen Reactions     Povidone Iodine      \"mild skin irritation\" per patient report     Toradol [Ketorolac] Other (See Comments)     Pt gets nightmares     Tramadol Other (See Comments)       PAST MEDICAL HISTORY:  Past Medical History:   Diagnosis Date     Abdominal wall cellulitis 3/16/2020     Abdominopelvic abscess (H) 2020     Anxiety      Asthma      Depression      DM (diabetes mellitus) (H)      Fracture of lower extremity 2013     Frequent UTI      History of blood transfusion      History of ulcer disease 2014    She notes from NSAIDs; nearly . Discussed a hospitalization at [facility] for this.     Hypertension      Hypothyroid      Iatrogenic Cushing's disease (H)     off prednisone now     Infection due to alpha-hemolytic Streptococcus 2020     Morbid obesity (H)      MRSA (methicillin resistant staph aureus) culture positive 2012    repeat cx 10/12/2012 no staph mentioned.     ANIYA -- noncompliant with CPAP      Osteoarthritis     multiple joings.     Other chronic pain      Perforated duodenal ulcer -- S/P Surgery 2019     SBO (small bowel obstruction) (H) 6/3/2020     Sepsis (WBC 20K, ) 3/16/2020     Sleep apnea     No longer uses cpap.       PAST SURGICAL HISTORY:  Past Surgical History:   Procedure Laterality Date     ABDOMEN SURGERY       APPLY WOUND VAC  2019    Procedure: Apply Wound Vac;  Surgeon: Ayan Lopez MD;  Location:  OR     CLOSE SECONDARY WOUND ABDOMEN N/A 2020    Procedure: SECONDARY ABDOMINAL WOUND CLOSURE;  Surgeon: Ayan Lopez MD;  Location:  OR     CYSTOSCOPY N/A 2018    Procedure: CYSTOSCOPY;  Cystoscopy and Botox " Injection Into the Bladder and suprapubic tube exchange;  Surgeon: Yoandy Rios MD;  [facility]     CYSTOSCOPY N/A 3/15/2019    Procedure: Cystoscopy, suprapubic tube exchange;  Surgeon: Yoandy Rios MD;  [facility]: [facility]     CYSTOSCOPY FLEXIBLE, CYSTOSTOMY, INSERT TUBE SUPRAPUBIC, COMBINED N/A 11/1/2019    Procedure: Cystoscopy, Bladder Botox Injection, Suprapubic Tube Change;  Surgeon: Yoandy Rios MD;  [facility]     CYSTOSCOPY, INTRAVESICAL INJECTION N/A 8/23/2017    Procedure: CYSTOSCOPY, INTRAVESICAL INJECTION;  Cystoscopy, Botox Injection Into The Bladder  Latex Allergy ;  Surgeon: Yoandy Rios MD;  Location: [facility] OR     CYSTOSCOPY, INTRAVESICAL INJECTION N/A 3/8/2018    Procedure: CYSTOSCOPY, INTRAVESICAL INJECTION;  Cystoscopy, Botox Injection Into The Bladder and suprapubic catheter exchange;  Surgeon: Yoandy Rios MD;  Location: [facility] OR     DISCECTOMY, FUSION CERVICAL ANTERIOR THREE+ LEVELS, COMBINED Left 5/3/2016    Procedure: COMBINED DISCECTOMY, FUSION CERVICAL ANTERIOR THREE+ LEVELS;  Surgeon: Jasvir Torres MD;  Location: [facility] OR     ENTEROSCOPY SMALL BOWEL N/A 6/8/2020    Procedure: Enteroscopy small bowel;  Surgeon: Gabe Forte MD;  Location:  GI     GASTROSTOMY, INSERT TUBE, COMBINED N/A 1/1/2020    Procedure: GASTRIC-JEJUNAL FEEDING TUBE INSERTION;  Surgeon: Ayan Lopez MD;  Location:  OR     GENITOURINARY SURGERY      suprapubic catheter     HERNIA REPAIR  1957    double hernia     HYSTERECTOMY, PAP NO LONGER INDICATED      CELIA and large ovarian tumor removed     INJECT BOTOX N/A 9/21/2018    Procedure: INJECT BOTOX;;  Surgeon: Yonady Rios MD;  [facility]: [facility]     INJECT BOTOX N/A 3/15/2019    Procedure: Inject Botox into Bladder;  Surgeon: Yoandy Rios MD;  [facility]     IR GASTRO JEJUNOSTOMY TUBE PLACEMENT  6/11/2020     IR GASTROSTOMY TUBE CHANGE  6/30/2020     IR JEJUNOSTOMY TUBE  CHANGE  2/4/2020     IR JEJUNOSTOMY TUBE CHANGE  2/19/2020     LAPAROSCOPY DIAGNOSTIC (GENERAL) N/A 12/30/2019    Procedure: Laparoscopy diagnostic (general);  Surgeon: Ayan Lopez MD;  Location: [facility] OR     LAPAROTOMY EXPLORATORY N/A 1/1/2020    Procedure: EXPLORATORY LAPAROTOMY;  Surgeon: Ayan Lopez MD;  Location: [facility]     LAPAROTOMY EXPLORATORY N/A 12/30/2019    Procedure: LAPAROTOMY, REPAIR OF ULCER PERFORATION;  Surgeon: Ayan Lopez MD;  Location: [facility] OR     SURGICAL HISTORY OF -       left cataract surgery     SURGICAL HISTORY OF -   12/14    right cataract surgery and left laser revision     SURGICAL HISTORY OF -   March 2015    left carpal tunnel release     TONSILLECTOMY  1974     ZZC LAMINECTOMY,FACETECTOMY,LUMBAR  1982     ZZC TOTAL KNEE ARTHROPLASTY  1999    left       FAMILY HISTORY:  Family History   Problem Relation Age of Onset     Depression Son      Hypertension Mother      Heart Disease Mother         bypass     Depression Mother      Hypertension Father      Connective Tissue Disorder Father         ankylosing spondylitis     Cancer Father         colon; prostate     Other Cancer Father         bladder cancer     Depression Sister        SOCIAL HISTORY:  Social History     Socioeconomic History     Marital status:      Spouse name: Not on file     Number of children: Not on file     Years of education: Not on file     Highest education level: Bachelor's degree (e.g., BA, AB, BS)   Occupational History     Not on file   Tobacco Use     Smoking status: Never Smoker     Smokeless tobacco: Never Used   Vaping Use     Vaping Use: Never used   Substance and Sexual Activity     Alcohol use: No     Drug use: Yes     Types: Marijuana     Comment: Medical canibis, methadone for chronic back pain     Sexual activity: Not Currently   Other Topics Concern     Parent/sibling w/ CABG, MI or angioplasty before 65F 55M? Not Asked   Social History Narrative     Not on file  "    Social Determinants of Health     Financial Resource Strain: Not on file   Food Insecurity: Not on file   Transportation Needs: Not on file   Physical Activity: Not on file   Stress: Not on file   Social Connections: Not on file   Intimate Partner Violence: Not At Risk     Fear of Current or Ex-Partner: No     Emotionally Abused: No     Physically Abused: No     Sexually Abused: No   Housing Stability: Not on file       Review of Systems:  Skin:  Negative       Eyes:  Positive for glasses    ENT:  Negative      Respiratory:  Positive for sleep apnea     Cardiovascular:  Negative      Gastroenterology: Negative      Genitourinary:  Negative      Musculoskeletal:  Negative      Neurologic:  Positive for numbness or tingling of hands;numbness or tingling of feet    Psychiatric:  Negative      Heme/Lymph/Imm:  Positive for allergies    Endocrine:  Positive for thyroid disorder;diabetes      Physical Exam:  Vitals: /82 (BP Location: Right arm, Patient Position: Sitting, Cuff Size: Adult Regular)   Pulse 80   Ht 1.651 m (5' 5\")   SpO2 97%   BMI 40.10 kg/m      Constitutional:  cooperative;well nourished obese      Skin:  warm and dry to the touch          Head:  normocephalic        Eyes:  pupils equal and round        Lymph:      ENT:  no pallor or cyanosis        Neck:  JVP normal        Respiratory:  clear to auscultation;normal respiratory excursion         Cardiac: normal S1 and S2;regular rhythm     no presence of murmur          pulses full and equal                                        GI:  abdomen soft        Extremities and Muscular Skeletal:  no edema              Neurological:  affect appropriate        Psych:  Alert and Oriented x 3          CC  No referring provider defined for this encounter.                  "

## 2022-01-11 ENCOUNTER — LAB (OUTPATIENT)
Dept: LAB | Facility: CLINIC | Age: 67
End: 2022-01-11
Payer: MEDICARE

## 2022-01-11 ENCOUNTER — OFFICE VISIT (OUTPATIENT)
Dept: CARDIOLOGY | Facility: CLINIC | Age: 67
End: 2022-01-11
Payer: MEDICARE

## 2022-01-11 VITALS
HEIGHT: 65 IN | HEART RATE: 80 BPM | SYSTOLIC BLOOD PRESSURE: 138 MMHG | DIASTOLIC BLOOD PRESSURE: 82 MMHG | OXYGEN SATURATION: 97 % | BODY MASS INDEX: 40.1 KG/M2

## 2022-01-11 DIAGNOSIS — I51.81 STRESS-INDUCED CARDIOMYOPATHY: ICD-10-CM

## 2022-01-11 DIAGNOSIS — I51.81 STRESS-INDUCED CARDIOMYOPATHY: Primary | ICD-10-CM

## 2022-01-11 DIAGNOSIS — E78.5 HYPERLIPIDEMIA LDL GOAL <70: ICD-10-CM

## 2022-01-11 LAB
ALT SERPL W P-5'-P-CCNC: 25 U/L (ref 0–50)
ANION GAP SERPL CALCULATED.3IONS-SCNC: 4 MMOL/L (ref 3–14)
BUN SERPL-MCNC: 21 MG/DL (ref 7–30)
CALCIUM SERPL-MCNC: 9.9 MG/DL (ref 8.5–10.1)
CHLORIDE BLD-SCNC: 101 MMOL/L (ref 94–109)
CHOLEST SERPL-MCNC: 209 MG/DL
CO2 SERPL-SCNC: 30 MMOL/L (ref 20–32)
CREAT SERPL-MCNC: 0.75 MG/DL (ref 0.52–1.04)
FASTING STATUS PATIENT QL REPORTED: NO
GFR SERPL CREATININE-BSD FRML MDRD: 88 ML/MIN/1.73M2
GLUCOSE BLD-MCNC: 101 MG/DL (ref 70–99)
HDLC SERPL-MCNC: 47 MG/DL
HGB BLD-MCNC: 13.8 G/DL (ref 11.7–15.7)
LDLC SERPL CALC-MCNC: 136 MG/DL
NONHDLC SERPL-MCNC: 162 MG/DL
POTASSIUM BLD-SCNC: 4.2 MMOL/L (ref 3.4–5.3)
SODIUM SERPL-SCNC: 135 MMOL/L (ref 133–144)
TRIGL SERPL-MCNC: 130 MG/DL

## 2022-01-11 PROCEDURE — 36415 COLL VENOUS BLD VENIPUNCTURE: CPT | Performed by: NURSE PRACTITIONER

## 2022-01-11 PROCEDURE — 85018 HEMOGLOBIN: CPT | Performed by: NURSE PRACTITIONER

## 2022-01-11 PROCEDURE — 80048 BASIC METABOLIC PNL TOTAL CA: CPT | Performed by: NURSE PRACTITIONER

## 2022-01-11 PROCEDURE — 84460 ALANINE AMINO (ALT) (SGPT): CPT | Performed by: NURSE PRACTITIONER

## 2022-01-11 PROCEDURE — 80061 LIPID PANEL: CPT | Performed by: NURSE PRACTITIONER

## 2022-01-11 PROCEDURE — 99214 OFFICE O/P EST MOD 30 MIN: CPT | Performed by: NURSE PRACTITIONER

## 2022-01-11 NOTE — PATIENT INSTRUCTIONS
Get labs today and we will call  You with the results  Will send refills for your statin/cholesterol pill    Return in 9 months

## 2022-01-11 NOTE — LETTER
1/11/2022    Sapphire Urbina MD  [address] [facility] [address]    RE: Bhavani BOBBY White       Dear Colleague,     I had the pleasure of seeing Bhavani White in the [facility].  HISTORY OF PRESENT ILLNESS:    This is a 66 year old female who follows at [facility]  Her past medical history includes:  Stress-induced cardiomyopathy, hypertension, chronic diastolic heart failure,  hyperlipidemia, type II diabetes, untreated sleep apnea, morbid obesity, neurogenic bladder with indwelling nguyen, chronic pain syndrome on Methadone, stage II CKD, depression, and asthma.    Ms White was hospitalized (12/2019) after she suffered a perforated duodenal ulcer and was quite sick with septic shock and respiratory failure. She suffered a type II non-STEMI and heart failure.   Her echo then showed LVEF 30% with tako-tsubo like regional wall motion abnormalities, moderate RV dysfunction, and no significant valvular pathology.  Prior to discharge, her ECHO was repeated and showed that her LVEF improved to 60% and her RV function normalized also.     Unfortunately, she suffered recurrent abdominal abscesses and sepsis and was admitted a few times after her initial duodenal perforation.  Her ECHO was repeated (3/2020) showing LVEF 60%, grade II diastolic dysfunction, moderate RV dysfunction.      cMRI (5/2020) showed LVEF 67% with moderate mid to apical inferior hypokinesis, overall normal RV function  The stress portion showed a small fixed apical, inferior defect consistent with a small infarction in the distal RCA without significant obed-infarct ischemia.    She was hospitalized (6/2020) for a small bowel obstruction which was treated conservatively.    I reviewed recent Epic records.  Due to her ongoing urinary issues and intermittent UTIs, a renal ultrasound was performed  A small amount of ascites was incidentally noted     Our visit today is for annual review    Ms White overall has  been stable the past 9 months  She denies any chest pain or significant shortness of breath  She mainly uses the wheel chair to get around  She denies any palpitations, orthopnea, or peripheral edema. Unfortunately, she cannot tolerate CPAP and has tried multiple masks over the years.        VITAL SIGNS:  BP: 138/82  Pulse: 80  Weight: not done    IMPRESSION AND PLAN:    Coronary Artery Disease:  -s/p NSTEMI in setting of septic shock (12/2019)  -cMRI (5/2020) showed concern for prior small infarct in distal RCA without significant obed-infarct ischemia  -denies angina  -ASA 81 mg    Resolved Stress-Induced Cardiomyopathy (2019)  -Last echo (3/2020) showed LVEF 60% with moderate RV dysfunction  -refuses CPAP  -no signs and symptoms of heart failure  -consider repeat ECHO next year    Mixed Hyperlipidemia:  -on Crestor 5 mg  -will get lipids today  She has already eaten so they will be non-fasting.     Type II Diabetes:  -Hgb A1C 5.8%    The total time for the visit today was 30 minutes which includes patient visit, reviewing of records, discussion, and placing of orders of the outpatient coordination of cardiovascular care as described.  The level of medical decision making during this visit was of moderate complexity.  Thank you for allowing me to participate in their care.    Orders Placed This Encounter   Procedures     Hemoglobin     Basic metabolic panel     Lipid Profile     ALT     Follow-Up with Cardiologist       No orders of the defined types were placed in this encounter.      There are no discontinued medications.      Encounter Diagnoses   Name Primary?     Stress-induced cardiomyopathy Yes     Hyperlipidemia LDL goal <70        CURRENT MEDICATIONS:  Current Outpatient Medications   Medication Sig Dispense Refill     aspirin 81 MG EC tablet Take 81 mg by mouth daily       bisacodyl (DULCOLAX) 10 MG suppository Place 10 mg rectally 2 times daily as needed for constipation       desonide (DESOWEN) 0.05 %  external ointment Apply topically 2 times daily 60 g 1     DULoxetine (CYMBALTA) 60 MG capsule Take 120 mg by mouth every morning       fluticasone-vilanterol (BREO ELLIPTA) 100-25 MCG/INH inhaler Inhale 1 puff into the lungs daily 28 each 5     gabapentin (NEURONTIN) 800 MG tablet Take 1,600 mg by mouth 3 times daily 0800, 1200 and 1800       hydrOXYzine (ATARAX) 50 MG tablet 50 mg 2 times daily as needed        hypromellose (ARTIFICIAL TEARS) 0.5 % SOLN ophthalmic solution 1 drop every hour as needed       ipratropium - albuterol 0.5 mg/2.5 mg/3 mL (DUONEB) 0.5-2.5 (3) MG/3ML neb solution Take 1 vial by nebulization every 6 hours as needed for shortness of breath / dyspnea or wheezing       ketoconazole (NIZORAL) 2 % external shampoo        levothyroxine (SYNTHROID/LEVOTHROID) 88 MCG tablet Take 1 tablet (88 mcg) by mouth daily 90 tablet 2     metFORMIN (GLUCOPHAGE-XR) 500 MG 24 hr tablet TAKE 1 TABLET(500 MG) BY MOUTH TWICE DAILY WITH MEALS 180 tablet 0     methadone (DOLOPHINE) 10 MG tablet Take 2 tablets (20 mg) by mouth 2 times daily 20 tablet 0     miconazole (MICATIN/MICRO GUARD) 2 % external powder Apply topically 2 times daily as needed for other (topical candidiasis)  0     multivitamin, therapeutic (THERA-VIT) TABS tablet Take 1 tablet by mouth daily       ondansetron (ZOFRAN-ODT) 4 MG ODT tab Take 1 tablet (4 mg) by mouth every 6 hours as needed for nausea or vomiting       oxybutynin ER (DITROPAN XL) 15 MG 24 hr tablet Take 1 tablet (15 mg) by mouth daily 90 tablet 4     pantoprazole (PROTONIX) 40 MG EC tablet TAKE 1 TABLET(40 MG) BY MOUTH DAILY 90 tablet 3     polyethylene glycol (MIRALAX/GLYCOLAX) packet Take 17 g by mouth 2 times daily as needed (constipation)  0     rosuvastatin (CRESTOR) 5 MG tablet TAKE 1 TABLET(5 MG) BY MOUTH AT BEDTIME 90 tablet 0     senna-docusate (SENOKOT-S/PERICOLACE) 8.6-50 MG tablet Take 2 tablets by mouth 2 times daily as needed for constipation 100 tablet 0      "tiZANidine (ZANAFLEX) 4 MG tablet Take 4 mg by mouth 3 times daily as needed for muscle spasms        traZODone (DESYREL) 100 MG tablet Take 150 mg by mouth At Bedtime       VENTOLIN  (90 Base) MCG/ACT inhaler INHALE 2 PUFFS INTO THE LUNGS EVERY 6 HOURS AS NEEDED FOR SHORTNESS OF BREATH OR DIFFICULT BREATHING 18 g 2     albuterol (PROVENTIL) (2.5 MG/3ML) 0.083% neb solution USE 1 VIAL VIA NEBULIZATION EVERY 4 HOURS AS NEEDED 75 mL 1       ALLERGIES     Allergies   Allergen Reactions     Povidone Iodine      \"mild skin irritation\" per patient report     Toradol [Ketorolac] Other (See Comments)     Pt gets nightmares     Tramadol Other (See Comments)       PAST MEDICAL HISTORY:  Past Medical History:   Diagnosis Date     Abdominal wall cellulitis 3/16/2020     Abdominopelvic abscess (H) 2020     Anxiety      Asthma      Depression      DM (diabetes mellitus) (H)      Fracture of lower extremity 2013     Frequent UTI      History of blood transfusion      History of ulcer disease 2014    She notes from NSAIDs; nearly . Discussed a hospitalization at [facility] for this.     Hypertension      Hypothyroid      Iatrogenic Cushing's disease (H)     off prednisone now     Infection due to alpha-hemolytic Streptococcus 2020     Morbid obesity (H)      MRSA (methicillin resistant staph aureus) culture positive 2012    repeat cx 10/12/2012 no staph mentioned.     ANIYA -- noncompliant with CPAP      Osteoarthritis     multiple joings.     Other chronic pain      Perforated duodenal ulcer -- S/P Surgery 2019     SBO (small bowel obstruction) (H) 6/3/2020     Sepsis (WBC 20K, ) 3/16/2020     Sleep apnea     No longer uses cpap.       PAST SURGICAL HISTORY:  Past Surgical History:   Procedure Laterality Date     ABDOMEN SURGERY       APPLY WOUND VAC  2019    Procedure: Apply Wound Vac;  Surgeon: Ayan Lopez MD;  Location: [facility]     CLOSE SECONDARY WOUND ABDOMEN " N/A 1/1/2020    Procedure: SECONDARY ABDOMINAL WOUND CLOSURE;  Surgeon: Ayan Lopez MD;  Location: [facility]     CYSTOSCOPY N/A 9/21/2018    Procedure: CYSTOSCOPY;  Cystoscopy and Botox Injection Into the Bladder and suprapubic tube exchange;  Surgeon: Yoandy Rios MD;  [facility]     CYSTOSCOPY N/A 3/15/2019    Procedure: Cystoscopy, suprapubic tube exchange;  Surgeon: Yoandy Rios MD;  [facility]     CYSTOSCOPY FLEXIBLE, CYSTOSTOMY, INSERT TUBE SUPRAPUBIC, COMBINED N/A 11/1/2019    Procedure: Cystoscopy, Bladder Botox Injection, Suprapubic Tube Change;  Surgeon: Yoandy Rios MD;  [facility]     CYSTOSCOPY, INTRAVESICAL INJECTION N/A 8/23/2017    Procedure: CYSTOSCOPY, INTRAVESICAL INJECTION;  Cystoscopy, Botox Injection Into The Bladder  Latex Allergy ;  Surgeon: Yoandy Rios MD;  [facility]     CYSTOSCOPY, INTRAVESICAL INJECTION N/A 3/8/2018    Procedure: CYSTOSCOPY, INTRAVESICAL INJECTION;  Cystoscopy, Botox Injection Into The Bladder and suprapubic catheter exchange;  Surgeon: Yoandy Rios MD;  [facility]     DISCECTOMY, FUSION CERVICAL ANTERIOR THREE+ LEVELS, COMBINED Left 5/3/2016    Procedure: COMBINED DISCECTOMY, FUSION CERVICAL ANTERIOR THREE+ LEVELS;  Surgeon: Jasvir Torres MD;  [facility]     ENTEROSCOPY SMALL BOWEL N/A 6/8/2020    Procedure: Enteroscopy small bowel;  Surgeon: Gabe Forte MD;  Location:  GI     GASTROSTOMY, INSERT TUBE, COMBINED N/A 1/1/2020    Procedure: GASTRIC-JEJUNAL FEEDING TUBE INSERTION;  Surgeon: Ayan Lopez MD;  Location:  OR     GENITOURINARY SURGERY      suprapubic catheter     HERNIA REPAIR  1957    double hernia     HYSTERECTOMY, PAP NO LONGER INDICATED      CELIA and large ovarian tumor removed     INJECT BOTOX N/A 9/21/2018    Procedure: INJECT BOTOX;;  Surgeon: Yoandy Rios MD;  [facility]     INJECT BOTOX N/A 3/15/2019    Procedure: Inject  Botox into Bladder;  Surgeon: Yoandy Rios MD;  [facility]     IR GASTRO JEJUNOSTOMY TUBE PLACEMENT  6/11/2020     IR GASTROSTOMY TUBE CHANGE  6/30/2020     IR JEJUNOSTOMY TUBE CHANGE  2/4/2020     IR JEJUNOSTOMY TUBE CHANGE  2/19/2020     LAPAROSCOPY DIAGNOSTIC (GENERAL) N/A 12/30/2019    Procedure: Laparoscopy diagnostic (general);  Surgeon: Ayan Lopez MD;  [facility]     LAPAROTOMY EXPLORATORY N/A 1/1/2020    Procedure: EXPLORATORY LAPAROTOMY;  Surgeon: Ayan Lopez MD;  [facility]     LAPAROTOMY EXPLORATORY N/A 12/30/2019    Procedure: LAPAROTOMY, REPAIR OF ULCER PERFORATION;  Surgeon: Ayan Lopez MD;  Location: [facility]     SURGICAL HISTORY OF -       left cataract surgery     SURGICAL HISTORY OF -   12/14    right cataract surgery and left laser revision     SURGICAL HISTORY OF -   March 2015    left carpal tunnel release     TONSILLECTOMY  1974     ZZC LAMINECTOMY,FACETECTOMY,LUMBAR  1982     ZZC TOTAL KNEE ARTHROPLASTY  1999    left       FAMILY HISTORY:  Family History   Problem Relation Age of Onset     Depression Son      Hypertension Mother      Heart Disease Mother         bypass     Depression Mother      Hypertension Father      Connective Tissue Disorder Father         ankylosing spondylitis     Cancer Father         colon; prostate     Other Cancer Father         bladder cancer     Depression Sister        SOCIAL HISTORY:  Social History     Socioeconomic History     Marital status:      Spouse name: Not on file     Number of children: Not on file     Years of education: Not on file     Highest education level: Bachelor's degree (e.g., BA, AB, BS)   Occupational History     Not on file   Tobacco Use     Smoking status: Never Smoker     Smokeless tobacco: Never Used   Vaping Use     Vaping Use: Never used   Substance and Sexual Activity     Alcohol use: No     Drug use: Yes     Types: Marijuana     Comment: Medical canibis, methadone for  "chronic back pain     Sexual activity: Not Currently   Other Topics Concern     Parent/sibling w/ CABG, MI or angioplasty before 65F 55M? Not Asked   Social History Narrative     Not on file     Social Determinants of Health     Financial Resource Strain: Not on file   Food Insecurity: Not on file   Transportation Needs: Not on file   Physical Activity: Not on file   Stress: Not on file   Social Connections: Not on file   Intimate Partner Violence: Not At Risk     Fear of Current or Ex-Partner: No     Emotionally Abused: No     Physically Abused: No     Sexually Abused: No   Housing Stability: Not on file       Review of Systems:  Skin:  Negative       Eyes:  Positive for glasses    ENT:  Negative      Respiratory:  Positive for sleep apnea     Cardiovascular:  Negative      Gastroenterology: Negative      Genitourinary:  Negative      Musculoskeletal:  Negative      Neurologic:  Positive for numbness or tingling of hands;numbness or tingling of feet    Psychiatric:  Negative      Heme/Lymph/Imm:  Positive for allergies    Endocrine:  Positive for thyroid disorder;diabetes      Physical Exam:  Vitals: /82 (BP Location: Right arm, Patient Position: Sitting, Cuff Size: Adult Regular)   Pulse 80   Ht 1.651 m (5' 5\")   SpO2 97%   BMI 40.10 kg/m      Constitutional:  cooperative;well nourished obese      Skin:  warm and dry to the touch          Head:  normocephalic        Eyes:  pupils equal and round        Lymph:      ENT:  no pallor or cyanosis        Neck:  JVP normal        Respiratory:  clear to auscultation;normal respiratory excursion         Cardiac: normal S1 and S2;regular rhythm     no presence of murmur          pulses full and equal                                        GI:  abdomen soft        Extremities and Muscular Skeletal:  no edema              Neurological:  affect appropriate        Psych:  Alert and Oriented x 3            June ELISSA Younger [facility]" [facility]

## 2022-01-12 ENCOUNTER — MEDICAL CORRESPONDENCE (OUTPATIENT)
Dept: HEALTH INFORMATION MANAGEMENT | Facility: CLINIC | Age: 67
End: 2022-01-12
Payer: MEDICARE

## 2022-01-12 ENCOUNTER — TELEPHONE (OUTPATIENT)
Dept: CARDIOLOGY | Facility: CLINIC | Age: 67
End: 2022-01-12
Payer: MEDICARE

## 2022-01-12 DIAGNOSIS — I21.4 NSTEMI (NON-ST ELEVATED MYOCARDIAL INFARCTION) (H): Primary | ICD-10-CM

## 2022-01-12 NOTE — TELEPHONE ENCOUNTER
----- Message from Ana Lilia Baker RN sent at 1/11/2022  3:49 PM CST -----  Labs after OV today  Mixed Hyperlipidemia:  -on Crestor 5 mg  -will get lipids today  She has already eaten so they will be non-fasting.   Will forward to ordering provider for review/recommendations.

## 2022-01-12 NOTE — TELEPHONE ENCOUNTER
Patient returned call and will have fasting labs done at her primary office in [place]. Ana Lilia Baker RN on 1/12/2022 at 3:22 PM        Elevated non-fasting lipids on low dose Crestor  BMP within normal limits  Recommend repeating with fasting lipids and consider increasing Crestor  Pls let her know  Thanks, SHAWN

## 2022-01-21 ENCOUNTER — LAB REQUISITION (OUTPATIENT)
Dept: LAB | Facility: CLINIC | Age: 67
End: 2022-01-21
Payer: COMMERCIAL

## 2022-01-21 ENCOUNTER — NURSE TRIAGE (OUTPATIENT)
Dept: UROLOGY | Facility: CLINIC | Age: 67
End: 2022-01-21
Payer: MEDICARE

## 2022-01-21 DIAGNOSIS — T83.511D URINARY TRACT INFECTION ASSOCIATED WITH INDWELLING URETHRAL CATHETER, SUBSEQUENT ENCOUNTER: Primary | ICD-10-CM

## 2022-01-21 DIAGNOSIS — N39.0 URINARY TRACT INFECTION ASSOCIATED WITH INDWELLING URETHRAL CATHETER, SUBSEQUENT ENCOUNTER: Primary | ICD-10-CM

## 2022-01-21 DIAGNOSIS — R31.9 HEMATURIA, UNSPECIFIED: ICD-10-CM

## 2022-01-21 DIAGNOSIS — Z53.9 DIAGNOSIS NOT YET DEFINED: Primary | ICD-10-CM

## 2022-01-21 LAB
ALBUMIN UR-MCNC: 10 MG/DL
APPEARANCE UR: ABNORMAL
BACTERIA #/AREA URNS HPF: ABNORMAL /HPF
BILIRUB UR QL STRIP: NEGATIVE
COLOR UR AUTO: YELLOW
GLUCOSE UR STRIP-MCNC: NEGATIVE MG/DL
HGB UR QL STRIP: NEGATIVE
KETONES UR STRIP-MCNC: NEGATIVE MG/DL
LEUKOCYTE ESTERASE UR QL STRIP: ABNORMAL
NITRATE UR QL: POSITIVE
PH UR STRIP: 5.5 [PH] (ref 5–7)
RBC URINE: 4 /HPF
SP GR UR STRIP: 1.02 (ref 1–1.03)
UROBILINOGEN UR STRIP-MCNC: NORMAL MG/DL
WBC URINE: 46 /HPF

## 2022-01-21 PROCEDURE — G0179 MD RECERTIFICATION HHA PT: HCPCS | Performed by: FAMILY MEDICINE

## 2022-01-21 PROCEDURE — 87086 URINE CULTURE/COLONY COUNT: CPT | Mod: ORL | Performed by: UROLOGY

## 2022-01-21 PROCEDURE — 81001 URINALYSIS AUTO W/SCOPE: CPT | Mod: ORL | Performed by: UROLOGY

## 2022-01-21 NOTE — TELEPHONE ENCOUNTER
[facility]    Phone Message    May a detailed message be left on voicemail: yes     Reason for Call: Order(s): Other: Requesting [facility] orders  Reason for requested: Maria De Jesus called to request UA/[facility] orders for Bhavani in regards to UTI symptoms  Date needed: 01/21/22  Provider name: Dr. Rios    Symptoms or Concerns     If patient has red-flag symptoms, warm transfer to triage line    Current symptom or concern: Uti symptoms - severe spasms, burning sensation    Symptoms have been present for:  3 day(s)    Has patient previously been seen for this? No    By :     Date:     Are there any new or worsening symptoms? Yes: new      Action Taken: Message routed to:  [facility] Uro    Travel Screening: Not Applicable

## 2022-01-21 NOTE — TELEPHONE ENCOUNTER
Spoke to Maria De Jesus home care nurse. Completed visit today. Pt reports having bladder spasms and burning sensation. Urine is foul smelling and has sediment. Nurse obtained urine sample from SPT catheter. No blood noted. No fever. No flank pain. Chart and problems list reviewed.    Anjali Alexander RN MSN    Reason for Disposition    Bad or foul-smelling urine    Protocols used: URINARY SYMPTOMS-A-OH    Orders Placed This Encounter   Procedures     Routine UA with microscopic - No culture     Standing Status:   Future     Standing Expiration Date:   1/21/2023     Urine Culture Aerobic Bacterial     Standing Status:   Future     Standing Expiration Date:   1/21/2023

## 2022-01-23 LAB
BACTERIA UR CULT: ABNORMAL

## 2022-01-24 ENCOUNTER — TELEPHONE (OUTPATIENT)
Dept: UROLOGY | Facility: CLINIC | Age: 67
End: 2022-01-24
Payer: MEDICARE

## 2022-01-24 DIAGNOSIS — N39.0 URINARY TRACT INFECTION ASSOCIATED WITH INDWELLING URETHRAL CATHETER, SUBSEQUENT ENCOUNTER: Primary | ICD-10-CM

## 2022-01-24 DIAGNOSIS — T83.511D URINARY TRACT INFECTION ASSOCIATED WITH INDWELLING URETHRAL CATHETER, SUBSEQUENT ENCOUNTER: Primary | ICD-10-CM

## 2022-01-24 NOTE — TELEPHONE ENCOUNTER
Yisel Cast MD Bratsch, THANG Sutton. If she's still symptomatic you can send her cipro thanks     Attempted to call pt. Left detailed message to call clinic back at [phone].   Calling pt to confirm if she's still having symptoms and confirm preferred pharmacy for treatment if needed.     Anjali Alexander, THANG MSN

## 2022-01-24 NOTE — TELEPHONE ENCOUNTER
Bhavani called to report that she would like her prescription sent to [id number] - [facility] [address] AT [facility] & [place] [address]. Please send cipro there and call Bhavani if there are any other questions or concerns. Thank you!

## 2022-01-25 RX ORDER — CIPROFLOXACIN 500 MG/1
500 TABLET, FILM COATED ORAL 2 TIMES DAILY
Qty: 10 TABLET | Refills: 0 | Status: SHIPPED | OUTPATIENT
Start: 2022-01-25 | End: 2022-03-02

## 2022-01-25 NOTE — TELEPHONE ENCOUNTER
Attempted to call pt. Left detailed message to call clinic back at [phone].   Calling to confirm order sent to preferred pharmacy.     Anjali Osborne, RN MSN    Signed Prescriptions:                        Disp   Refills    ciprofloxacin (CIPRO) 500 MG tablet        10 tab*0        Sig: Take 1 tablet (500 mg) by mouth 2 times daily  Authorizing Provider: PANCHO DAS  Ordering User: ANJALI OSBORNE

## 2022-02-23 DIAGNOSIS — E03.4 HYPOTHYROIDISM DUE TO ACQUIRED ATROPHY OF THYROID: ICD-10-CM

## 2022-02-24 RX ORDER — LEVOTHYROXINE SODIUM 88 UG/1
TABLET ORAL
Qty: 90 TABLET | Refills: 0 | Status: SHIPPED | OUTPATIENT
Start: 2022-02-24 | End: 2022-03-02

## 2022-02-24 RX ORDER — LEVOTHYROXINE SODIUM 88 UG/1
TABLET ORAL
Qty: 90 TABLET | Refills: 2 | OUTPATIENT
Start: 2022-02-24

## 2022-03-02 ENCOUNTER — OFFICE VISIT (OUTPATIENT)
Dept: FAMILY MEDICINE | Facility: CLINIC | Age: 67
End: 2022-03-02
Payer: MEDICARE

## 2022-03-02 VITALS
HEIGHT: 65 IN | HEART RATE: 88 BPM | OXYGEN SATURATION: 99 % | TEMPERATURE: 99.4 F | SYSTOLIC BLOOD PRESSURE: 136 MMHG | DIASTOLIC BLOOD PRESSURE: 78 MMHG | WEIGHT: 256 LBS | BODY MASS INDEX: 42.65 KG/M2

## 2022-03-02 DIAGNOSIS — N31.9 NEUROGENIC BLADDER: ICD-10-CM

## 2022-03-02 DIAGNOSIS — K21.9 GASTROESOPHAGEAL REFLUX DISEASE WITHOUT ESOPHAGITIS: ICD-10-CM

## 2022-03-02 DIAGNOSIS — G82.20 PARAPLEGIA (H): ICD-10-CM

## 2022-03-02 DIAGNOSIS — Z12.31 ENCOUNTER FOR SCREENING MAMMOGRAM FOR BREAST CANCER: ICD-10-CM

## 2022-03-02 DIAGNOSIS — E78.5 HYPERLIPIDEMIA LDL GOAL <100: ICD-10-CM

## 2022-03-02 DIAGNOSIS — Z93.59 OTHER CYSTOSTOMY STATUS (H): ICD-10-CM

## 2022-03-02 DIAGNOSIS — N18.2 CHRONIC KIDNEY DISEASE, STAGE 2 (MILD): ICD-10-CM

## 2022-03-02 DIAGNOSIS — I50.32 CHRONIC DIASTOLIC (CONGESTIVE) HEART FAILURE (H): ICD-10-CM

## 2022-03-02 DIAGNOSIS — Z78.0 MENOPAUSE: Primary | ICD-10-CM

## 2022-03-02 DIAGNOSIS — E66.01 MORBID OBESITY, UNSPECIFIED OBESITY TYPE (H): ICD-10-CM

## 2022-03-02 DIAGNOSIS — E03.4 HYPOTHYROIDISM DUE TO ACQUIRED ATROPHY OF THYROID: ICD-10-CM

## 2022-03-02 DIAGNOSIS — F11.20 METHADONE MAINTENANCE THERAPY PATIENT (H): ICD-10-CM

## 2022-03-02 DIAGNOSIS — F33.42 RECURRENT MAJOR DEPRESSIVE DISORDER, IN FULL REMISSION (H): ICD-10-CM

## 2022-03-02 DIAGNOSIS — Z79.899 ENCOUNTER FOR LONG-TERM (CURRENT) USE OF MEDICATIONS: ICD-10-CM

## 2022-03-02 DIAGNOSIS — E11.21 TYPE 2 DIABETES MELLITUS WITH DIABETIC NEPHROPATHY, WITHOUT LONG-TERM CURRENT USE OF INSULIN (H): ICD-10-CM

## 2022-03-02 PROBLEM — E44.1 MILD PROTEIN-CALORIE MALNUTRITION (H): Status: RESOLVED | Noted: 2021-11-29 | Resolved: 2022-03-02

## 2022-03-02 LAB
ERYTHROCYTE [DISTWIDTH] IN BLOOD BY AUTOMATED COUNT: 14.8 % (ref 10–15)
HBA1C MFR BLD: 5.9 % (ref 0–5.6)
HCT VFR BLD AUTO: 45.8 % (ref 35–47)
HGB BLD-MCNC: 14.7 G/DL (ref 11.7–15.7)
MCH RBC QN AUTO: 25.8 PG (ref 26.5–33)
MCHC RBC AUTO-ENTMCNC: 32.1 G/DL (ref 31.5–36.5)
MCV RBC AUTO: 80 FL (ref 78–100)
PLATELET # BLD AUTO: 260 10E3/UL (ref 150–450)
RBC # BLD AUTO: 5.7 10E6/UL (ref 3.8–5.2)
WBC # BLD AUTO: 9.6 10E3/UL (ref 4–11)

## 2022-03-02 PROCEDURE — 85027 COMPLETE CBC AUTOMATED: CPT | Performed by: FAMILY MEDICINE

## 2022-03-02 PROCEDURE — 99397 PER PM REEVAL EST PAT 65+ YR: CPT | Mod: 25 | Performed by: FAMILY MEDICINE

## 2022-03-02 PROCEDURE — 99214 OFFICE O/P EST MOD 30 MIN: CPT | Mod: 25 | Performed by: FAMILY MEDICINE

## 2022-03-02 PROCEDURE — 83036 HEMOGLOBIN GLYCOSYLATED A1C: CPT | Performed by: FAMILY MEDICINE

## 2022-03-02 PROCEDURE — 36415 COLL VENOUS BLD VENIPUNCTURE: CPT | Performed by: FAMILY MEDICINE

## 2022-03-02 PROCEDURE — 82043 UR ALBUMIN QUANTITATIVE: CPT | Performed by: FAMILY MEDICINE

## 2022-03-02 PROCEDURE — 84443 ASSAY THYROID STIM HORMONE: CPT | Performed by: FAMILY MEDICINE

## 2022-03-02 PROCEDURE — 90732 PPSV23 VACC 2 YRS+ SUBQ/IM: CPT | Performed by: FAMILY MEDICINE

## 2022-03-02 PROCEDURE — 99207 PR FOOT EXAM NO CHARGE: CPT | Mod: 25 | Performed by: FAMILY MEDICINE

## 2022-03-02 PROCEDURE — 90471 IMMUNIZATION ADMIN: CPT | Performed by: FAMILY MEDICINE

## 2022-03-02 PROCEDURE — 93000 ELECTROCARDIOGRAM COMPLETE: CPT | Performed by: FAMILY MEDICINE

## 2022-03-02 RX ORDER — METFORMIN HCL 500 MG
TABLET, EXTENDED RELEASE 24 HR ORAL
Qty: 180 TABLET | Refills: 3 | Status: SHIPPED | OUTPATIENT
Start: 2022-03-02 | End: 2023-01-01

## 2022-03-02 RX ORDER — ROSUVASTATIN CALCIUM 5 MG/1
TABLET, COATED ORAL
Qty: 90 TABLET | Refills: 3 | Status: SHIPPED | OUTPATIENT
Start: 2022-03-02 | End: 2022-10-10

## 2022-03-02 RX ORDER — OXYBUTYNIN CHLORIDE 15 MG/1
15 TABLET, EXTENDED RELEASE ORAL DAILY
Qty: 90 TABLET | Refills: 4 | Status: SHIPPED | OUTPATIENT
Start: 2022-03-02 | End: 2022-10-10

## 2022-03-02 RX ORDER — LISINOPRIL 2.5 MG/1
2.5 TABLET ORAL DAILY
Qty: 90 TABLET | Refills: 3 | Status: SHIPPED | OUTPATIENT
Start: 2022-03-02 | End: 2023-01-01

## 2022-03-02 RX ORDER — PANTOPRAZOLE SODIUM 40 MG/1
40 TABLET, DELAYED RELEASE ORAL DAILY
Qty: 90 TABLET | Refills: 3 | Status: SHIPPED | OUTPATIENT
Start: 2022-03-02 | End: 2022-06-30

## 2022-03-02 RX ORDER — LEVOTHYROXINE SODIUM 88 UG/1
88 TABLET ORAL DAILY
Qty: 90 TABLET | Refills: 3 | Status: SHIPPED | OUTPATIENT
Start: 2022-03-02 | End: 2023-01-01

## 2022-03-02 ASSESSMENT — ENCOUNTER SYMPTOMS
HEMATURIA: 0
COUGH: 0
ARTHRALGIAS: 1
JOINT SWELLING: 0
MYALGIAS: 1
SHORTNESS OF BREATH: 0
EYE PAIN: 0
HEADACHES: 0
DIARRHEA: 0
SORE THROAT: 0
PALPITATIONS: 0
PARESTHESIAS: 1
CHILLS: 0
NAUSEA: 0
DIZZINESS: 0
ABDOMINAL PAIN: 0
FREQUENCY: 0
BREAST MASS: 0
NERVOUS/ANXIOUS: 1
CONSTIPATION: 0
HEMATOCHEZIA: 0
FEVER: 0
HEARTBURN: 0
DYSURIA: 0
WEAKNESS: 1

## 2022-03-02 ASSESSMENT — ASTHMA QUESTIONNAIRES: ACT_TOTALSCORE: 19

## 2022-03-02 ASSESSMENT — ACTIVITIES OF DAILY LIVING (ADL): CURRENT_FUNCTION: NO ASSISTANCE NEEDED

## 2022-03-02 NOTE — PROGRESS NOTES
"SUBJECTIVE:   Bhavani White is a 66 year old female who presents for Preventive Visit.    She has a number of ongoing issues.   She is due for diabetes recheck.   She needs annual EKG for chronic methadone rx.   She has neurogenic bladder with indwelling catheter.         Patient has been advised of split billing requirements and indicates understanding: Yes  Are you in the first 12 months of your Medicare coverage?  Yes,  Visual Acuity:  Right Eye: 20/20   Left Eye: 20/25  Both Eyes: 20/25    Healthy Habits:     In general, how would you rate your overall health?  Good    Frequency of exercise:  None    Do you usually eat at least 4 servings of fruit and vegetables a day, include whole grains    & fiber and avoid regularly eating high fat or \"junk\" foods?  No    Taking medications regularly:  Yes    Medication side effects:  None    Ability to successfully perform activities of daily living:  No assistance needed    Home Safety:  No safety concerns identified    Hearing Impairment:  No hearing concerns    In the past 6 months, have you been bothered by leaking of urine? Yes    In general, how would you rate your overall mental or emotional health?  Fair      PHQ-2 Total Score: 1    Additional concerns today:  No    Do you feel safe in your environment? Yes    Have you ever done Advance Care Planning? (For example, a Health Directive, POLST, or a discussion with a medical provider or your loved ones about your wishes): Yes, advance care planning is on file.       Fall risk  Fallen 2 or more times in the past year?: No  Any fall with injury in the past year?: No    Cognitive Screening   1) Repeat 3 items (Leader, Season, Table)    2) Clock draw: NORMAL  3) 3 item recall: Recalls 2 objects   Results: NORMAL clock, 1-2 items recalled: COGNITIVE IMPAIRMENT LESS LIKELY    Mini-CogTM Copyright KD Ortiz. Licensed by the author for use in [facility]; reprinted with permission (nena@.[facility]). All rights " reserved.      Do you have sleep apnea, excessive snoring or daytime drowsiness?: sleep apnea    Reviewed and updated as needed this visit by clinical staff                  Reviewed and updated as needed this visit by Provider                 Social History     Tobacco Use     Smoking status: Never Smoker     Smokeless tobacco: Never Used   Substance Use Topics     Alcohol use: No         Alcohol Use 3/2/2022   Prescreen: >3 drinks/day or >7 drinks/week? Not Applicable   Prescreen: >3 drinks/day or >7 drinks/week? -               Current providers sharing in care for this patient include:   Patient Care Team:  Sapphire Adler MD as PCP - General (Family Medicine)  Rakel Ramírez MD as MD (Urology)  Yoandy Rios MD as MD (Urology)  [facility] ([facility] ([place]  Yani Riojas, RN as Specialty Care Coordinator (Urology)  Sapphire Adler MD as Assigned PCP  Yoandy Rios MD as Assigned Surgical Provider  Clemencia Decker APRN CNP as Assigned Heart and Vascular Provider    The following health maintenance items are reviewed in Epic and correct as of today:  Health Maintenance Due   Topic Date Due     DEXA  Never done     HF ACTION PLAN  Never done     ZOSTER IMMUNIZATION (1 of 2) Never done     DIABETIC FOOT EXAM  10/27/2018     MAMMO SCREENING  05/01/2019     EYE EXAM  08/28/2021     A1C  09/19/2021     MICROALBUMIN  09/19/2021     MEDICARE ANNUAL WELLNESS VISIT  Never done     Pneumococcal Vaccine: Pediatrics (0 to 5 Years) and At-Risk Patients (6 to 64 Years) (2 of 2 - PPSV23) 11/13/2021     Pneumococcal Vaccine: 65+ Years (2 of 2 - PPSV23) 11/13/2021     TSH W/FREE T4 REFLEX  03/19/2022     URINE DRUG SCREEN  03/19/2022     ANNUAL REVIEW OF HM ORDERS  03/19/2022     ASTHMA ACTION PLAN  03/19/2022     CBC  03/19/2022     Labs reviewed in EPIC  Pneumonia Vaccine:Adults age 65+ who received Pneumovax (PPSV23) at 65 years or older: Should be  "given PCV13 > 1 year after their most recent PPSV23  Any new diagnosis of family breast, ovarian, or bowel cancer? No    FHS-7: No flowsheet data found.    Mammogram Screening: Recommended mammography every 1-2 years with patient discussion and risk factor consideration  Pertinent mammograms are reviewed under the imaging tab.    Review of Systems   Constitutional: Negative for chills and fever.   HENT: Negative for congestion, ear pain, hearing loss and sore throat.    Eyes: Negative for pain and visual disturbance.   Respiratory: Negative for cough and shortness of breath.    Cardiovascular: Negative for chest pain, palpitations and peripheral edema.   Gastrointestinal: Negative for abdominal pain, constipation, diarrhea, heartburn, hematochezia and nausea.   Breasts:  Negative for tenderness, breast mass and discharge.   Genitourinary: Negative for dysuria, frequency, genital sores, hematuria, pelvic pain, urgency, vaginal bleeding and vaginal discharge.   Musculoskeletal: Positive for arthralgias and myalgias. Negative for joint swelling.   Skin: Negative for rash.   Neurological: Positive for weakness and paresthesias. Negative for dizziness and headaches.   Psychiatric/Behavioral: Positive for mood changes. The patient is nervous/anxious.      Constitutional, HEENT, cardiovascular, pulmonary, gi and gu systems are negative, except as otherwise noted.    OBJECTIVE:   /78 (BP Location: Right arm, Patient Position: Sitting, Cuff Size: Adult Regular)   Pulse 88   Temp 99.4  F (37.4  C) (Oral)   Ht 1.651 m (5' 5\")   Wt 116.1 kg (256 lb)   SpO2 99%   BMI 42.60 kg/m   Estimated body mass index is 40.1 kg/m  as calculated from the following:    Height as of 1/11/22: 1.651 m (5' 5\").    Weight as of 7/21/21: 109.3 kg (241 lb).  Physical Exam  GENERAL APPEARANCE: alert, no distress and obese  EYES: Eyes grossly normal to inspection, PERRL and conjunctivae and sclerae normal  HENT: ear canals and TM's normal, " nose and mouth without ulcers or lesions, oropharynx clear and oral mucous membranes moist  NECK: no adenopathy, no asymmetry, masses, or scars and thyroid normal to palpation  RESP: lungs clear to auscultation - no rales, rhonchi or wheezes  CV: regular rate and rhythm, normal S1 S2, no S3 or S4, no murmur, click or rub, no peripheral edema and peripheral pulses strong  ABDOMEN: soft, nontender, no hepatosplenomegaly, no masses and bowel sounds normal  MS: no musculoskeletal defects are noted and gait is age appropriate without ataxia  SKIN: no suspicious lesions or rashes  NEURO: Normal strength and tone, mentation intact and speech normal  DIABETIC FOOT EXAM: normal DP and PT pulses, no trophic changes or ulcerative lesions and monofilament with some feeling on right bottom of foot and otherwise no feeling in feet  PSYCH: mentation appears normal and affect normal/bright    Diagnostic Test Results:  Labs reviewed in Epic    ASSESSMENT / PLAN:   (Z78.0) Menopause  (primary encounter diagnosis)  Comment:   Plan: DEXA HIP/PELVIS/SPINE - Future            (N18.2) Chronic kidney disease, stage 2 (mild)  Comment: stable  Plan: lisinopril (ZESTRIL) 2.5 MG tablet        Restart lisinopril for kidney protection    (E03.4) Hypothyroidism due to acquired atrophy of thyroid  Comment: under control  Plan: TSH WITH FREE T4 REFLEX, levothyroxine         (SYNTHROID/LEVOTHROID) 88 MCG tablet              (I50.32) Chronic diastolic (congestive) heart failure (H)  Comment: stable  Plan: CBC with Platelets, HEART FAILURE ACTION PLAN        Now on ACEI but wonder about beta blocker    (E78.5) Hyperlipidemia LDL goal <100  Comment: under control  Plan: rosuvastatin (CRESTOR) 5 MG tablet        Refills per [facility]     (K21.9) Gastroesophageal reflux disease without esophagitis  Comment: stable  Plan: pantoprazole (PROTONIX) 40 MG EC tablet        continue    (N31.9) Neurogenic bladder  Comment: stable  Plan: oxybutynin ER (DITROPAN XL)  "15 MG 24 hr tablet        continue    (E11.21) Type 2 diabetes mellitus with diabetic nephropathy, without long-term current use of insulin (H)  Comment: under control  Plan: HEMOGLOBIN A1C, Albumin Random Urine         Quantitative with Creat Ratio, FOOT EXAM,         ZOSTER VACCINE RECOMBINANT ADJUVANTED IM NJX,         metFORMIN (GLUCOPHAGE-XR) 500 MG 24 hr tablet        Refills per [facility]     (Z12.31) Encounter for screening mammogram for breast cancer  Comment:   Plan: *MA Screening Digital Bilateral            (F33.42) Recurrent major depressive disorder, in full remission (H)  Comment: stable  Plan: continue rx    (G82.20) Paraplegia (H)  Comment: ongoing - she would like a cushion for the seat of her wheelchair.   This is reasonable and necessary as she is in the chair all day.   This will  Help prevent pressure ulcers and pain.     Plan: in wheelchair  DME for wheelchair cushion    (E66.01) Morbid obesity, unspecified obesity type (H)  Comment: stable      (Z93.59) Other cystostomy status (H)  Comment: chronic      (F11.20) Methadone maintenance therapy patient (H)  Comment:   Plan: EKG 12-lead complete w/read - Clinics              Patient has been advised of split billing requirements and indicates understanding: Yes    COUNSELING:  Reviewed preventive health counseling, as reflected in patient instructions  Special attention given to:       Dental care       Immunizations    Vaccinated for: Pneumococcal             Osteoporosis prevention/bone health       Colon cancer screening    Estimated body mass index is 40.1 kg/m  as calculated from the following:    Height as of 1/11/22: 1.651 m (5' 5\").    Weight as of 7/21/21: 109.3 kg (241 lb).        She reports that she has never smoked. She has never used smokeless tobacco.      Appropriate preventive services were discussed with this patient, including applicable screening as appropriate for cardiovascular disease, diabetes, osteopenia/osteoporosis, and " glaucoma.  As appropriate for age/gender, discussed screening for colorectal cancer, prostate cancer, breast cancer, and cervical cancer. Checklist reviewing preventive services available has been given to the patient.    Reviewed patients plan of care and provided an AVS. The Complex Care Plan (for patients with higher acuity and needing more deliberate coordination of services) for Bhavani meets the Care Plan requirement. This Care Plan has been established and reviewed with the Patient.    Time spent doing all with review of chart as new patient to me, exam, refills, charting, review EKG 50 minutes    Counseling Resources:  ATP IV Guidelines  Pooled Cohorts Equation Calculator  Breast Cancer Risk Calculator  Breast Cancer: Medication to Reduce Risk  FRAX Risk Assessment  ICSI Preventive Guidelines  Dietary Guidelines for Americans, 2010  [employer]'s MyPlate  ASA Prophylaxis  Lung CA Screening    Karen Santos MD  [facility]    Identified Health Risks:

## 2022-03-03 DIAGNOSIS — E03.4 HYPOTHYROIDISM DUE TO ACQUIRED ATROPHY OF THYROID: ICD-10-CM

## 2022-03-03 LAB
CREAT UR-MCNC: 150 MG/DL
MICROALBUMIN UR-MCNC: 160 MG/L
MICROALBUMIN/CREAT UR: 106.67 MG/G CR (ref 0–25)
TSH SERPL DL<=0.005 MIU/L-ACNC: 1.56 MU/L (ref 0.4–4)

## 2022-03-03 RX ORDER — LEVOTHYROXINE SODIUM 88 UG/1
TABLET ORAL
Qty: 90 TABLET | Refills: 3 | OUTPATIENT
Start: 2022-03-03

## 2022-03-03 NOTE — TELEPHONE ENCOUNTER
Medication was issued on 3/2/2022, 90 tabs, 3 refills.     Gabriela Che, RN, BSN, PHN  [facility]

## 2022-03-04 ENCOUNTER — MYC MEDICAL ADVICE (OUTPATIENT)
Dept: FAMILY MEDICINE | Facility: CLINIC | Age: 67
End: 2022-03-04
Payer: MEDICARE

## 2022-03-04 DIAGNOSIS — J45.30 MILD PERSISTENT ASTHMA WITHOUT COMPLICATION: Primary | ICD-10-CM

## 2022-03-05 ENCOUNTER — MYC MEDICAL ADVICE (OUTPATIENT)
Dept: FAMILY MEDICINE | Facility: CLINIC | Age: 67
End: 2022-03-05
Payer: MEDICARE

## 2022-03-08 ENCOUNTER — MYC MEDICAL ADVICE (OUTPATIENT)
Dept: FAMILY MEDICINE | Facility: CLINIC | Age: 67
End: 2022-03-08
Payer: MEDICARE

## 2022-03-08 NOTE — TELEPHONE ENCOUNTER
Rashad Nguyễn  Can you please address Bhavani's question regarding an inhaler refill?  Thanks,  Susan Haase, CNP

## 2022-03-08 NOTE — TELEPHONE ENCOUNTER
Patient advised via my chart advair was sent     Wendy Pearson Registered Nurse  [facility]

## 2022-03-09 NOTE — TELEPHONE ENCOUNTER
Duplicate, see other [employer] message, [facility] responded  Ekta Mccarthy, RN, BSN  [facility]

## 2022-03-13 ENCOUNTER — MEDICAL CORRESPONDENCE (OUTPATIENT)
Dept: HEALTH INFORMATION MANAGEMENT | Facility: CLINIC | Age: 67
End: 2022-03-13
Payer: MEDICARE

## 2022-03-17 ENCOUNTER — TELEPHONE (OUTPATIENT)
Dept: FAMILY MEDICINE | Facility: CLINIC | Age: 67
End: 2022-03-17
Payer: MEDICARE

## 2022-03-17 NOTE — TELEPHONE ENCOUNTER
Gabriela calling from [facility] for orders.    Skilled Nurse  1 time a month for 2 times  3 as needed   For catheter changes    Verbal order given.  Will fax for MD signature.  Lakisha Marie RN

## 2022-03-31 ENCOUNTER — TELEPHONE (OUTPATIENT)
Dept: FAMILY MEDICINE | Facility: CLINIC | Age: 67
End: 2022-03-31
Payer: MEDICARE

## 2022-03-31 DIAGNOSIS — R30.0 DYSURIA: Primary | ICD-10-CM

## 2022-03-31 NOTE — TELEPHONE ENCOUNTER
Received call from [facility] requesting verbal orders for urine testing. Per HC RN:     Patient requesting UA/UC. Patient experiencing urinary spasms and urine bypassing through urethra. Patient has suprapubic catheter.     Please review and advise on ok for verbal orders for UA/UC on patient. Patient states she was establishing care with Dr. Santos during OV 3/2/22.     Call back to HC RN: [phone] - ok to [facility].     Alfonso VEGA RN

## 2022-04-01 ENCOUNTER — LAB REQUISITION (OUTPATIENT)
Dept: LAB | Facility: CLINIC | Age: 67
End: 2022-04-01
Payer: MEDICARE

## 2022-04-01 DIAGNOSIS — N31.9 NEUROMUSCULAR DYSFUNCTION OF BLADDER, UNSPECIFIED: ICD-10-CM

## 2022-04-01 DIAGNOSIS — Z87.440 PERSONAL HISTORY OF URINARY (TRACT) INFECTIONS: ICD-10-CM

## 2022-04-01 LAB
ALBUMIN UR-MCNC: 10 MG/DL
APPEARANCE UR: CLEAR
BACTERIA #/AREA URNS HPF: ABNORMAL /HPF
BILIRUB UR QL STRIP: NEGATIVE
COLOR UR AUTO: ABNORMAL
GLUCOSE UR STRIP-MCNC: NEGATIVE MG/DL
HGB UR QL STRIP: ABNORMAL
HYALINE CASTS: 1 /LPF
KETONES UR STRIP-MCNC: NEGATIVE MG/DL
LEUKOCYTE ESTERASE UR QL STRIP: ABNORMAL
MUCOUS THREADS #/AREA URNS LPF: PRESENT /LPF
NITRATE UR QL: POSITIVE
PH UR STRIP: 6.5 [PH] (ref 5–7)
RBC URINE: 2 /HPF
SP GR UR STRIP: 1.02 (ref 1–1.03)
SQUAMOUS EPITHELIAL: <1 /HPF
UROBILINOGEN UR STRIP-MCNC: NORMAL MG/DL
WBC URINE: 38 /HPF

## 2022-04-01 PROCEDURE — 87088 URINE BACTERIA CULTURE: CPT | Mod: ORL | Performed by: FAMILY MEDICINE

## 2022-04-01 PROCEDURE — 81001 URINALYSIS AUTO W/SCOPE: CPT | Mod: ORL | Performed by: FAMILY MEDICINE

## 2022-04-01 NOTE — TELEPHONE ENCOUNTER
Dana (Mira) [phone] calling from [facility] to check on below.  They are collecting urine today but do not have orders.  Please advise.  Lakisha Marie RN

## 2022-04-01 NOTE — TELEPHONE ENCOUNTER
This needs to go to Dr. Santos if patient has established care with her, and have PCP changed in chart.

## 2022-04-03 ENCOUNTER — TELEPHONE (OUTPATIENT)
Dept: NEUROLOGY | Facility: CLINIC | Age: 67
End: 2022-04-03
Payer: MEDICARE

## 2022-04-03 ENCOUNTER — MYC MEDICAL ADVICE (OUTPATIENT)
Dept: UROLOGY | Facility: CLINIC | Age: 67
End: 2022-04-03
Payer: MEDICARE

## 2022-04-03 ENCOUNTER — TELEPHONE (OUTPATIENT)
Dept: SURGERY | Facility: CLINIC | Age: 67
End: 2022-04-03
Payer: MEDICARE

## 2022-04-03 DIAGNOSIS — N39.0 URINARY TRACT INFECTION ASSOCIATED WITH INDWELLING URETHRAL CATHETER, SUBSEQUENT ENCOUNTER: Primary | ICD-10-CM

## 2022-04-03 DIAGNOSIS — T83.511D URINARY TRACT INFECTION ASSOCIATED WITH INDWELLING URETHRAL CATHETER, SUBSEQUENT ENCOUNTER: Primary | ICD-10-CM

## 2022-04-03 DIAGNOSIS — N32.89 BLADDER SPASM: Primary | ICD-10-CM

## 2022-04-03 LAB
BACTERIA UR CULT: ABNORMAL

## 2022-04-03 RX ORDER — SULFAMETHOXAZOLE/TRIMETHOPRIM 800-160 MG
1 TABLET ORAL 2 TIMES DAILY
Qty: 6 TABLET | Refills: 0 | Status: SHIPPED | OUTPATIENT
Start: 2022-04-03 | End: 2022-04-06

## 2022-04-03 NOTE — TELEPHONE ENCOUNTER
Telephone Note    Pt called to inquire about UC results. UC is still in progress however pt is having bad spasms causing frequent incontinence, and fatigue. I see Dr. Santos recommends Septra for 3 days and pt cannot reach her so I will go ahead and prescribe and route this note to Dr. Santos. I recommended Bhavani call her office tomorrow to follow up on UC results to make sure Bactrim is the appropriate treatment for any bacteria that may grow on culture.     Discussed warning signs of worsening infection that should prompt urgent/emergent medical care and pt voiced understanding. Also discussed limitations of telephone encounter in that objective measures are not available and this encounter is therefore limited.      Basil Davis MD  Urology PGY-4

## 2022-04-03 NOTE — TELEPHONE ENCOUNTER
Patient called about a UTI.  Does not follow in neurology.  Suspect  sent to wrong provider and she states she was looking for Urology.  She states she will call back.      [place]

## 2022-04-04 ENCOUNTER — TELEPHONE (OUTPATIENT)
Dept: FAMILY MEDICINE | Facility: CLINIC | Age: 67
End: 2022-04-04
Payer: MEDICARE

## 2022-04-04 DIAGNOSIS — T83.511D URINARY TRACT INFECTION ASSOCIATED WITH INDWELLING URETHRAL CATHETER, SUBSEQUENT ENCOUNTER: Primary | ICD-10-CM

## 2022-04-04 DIAGNOSIS — N39.0 URINARY TRACT INFECTION ASSOCIATED WITH INDWELLING URETHRAL CATHETER, SUBSEQUENT ENCOUNTER: Primary | ICD-10-CM

## 2022-04-04 RX ORDER — SULFAMETHOXAZOLE/TRIMETHOPRIM 800-160 MG
1 TABLET ORAL 2 TIMES DAILY
Qty: 6 TABLET | Refills: 0 | Status: CANCELLED | OUTPATIENT
Start: 2022-04-04 | End: 2022-04-07

## 2022-04-04 RX ORDER — SULFAMETHOXAZOLE/TRIMETHOPRIM 800-160 MG
1 TABLET ORAL 2 TIMES DAILY
Qty: 6 TABLET | Refills: 0 | Status: SHIPPED | OUTPATIENT
Start: 2022-04-04 | End: 2022-04-07

## 2022-04-04 NOTE — TELEPHONE ENCOUNTER
Left message to call clinic RN back. See 4/1/22 urine results.     Karen Santos MD   4/2/2022  6:40 PM CDT         Looks like bladder infection.   Where should we send the rx?   I would recommend septra ds 1 twice daily for 3 days     Sumit Laws RN

## 2022-04-04 NOTE — TELEPHONE ENCOUNTER
Patient requested results/plan of care in 4/4/22 MyChart. Reply sent in that encounter.    [facility]   Sumit Laws RN

## 2022-04-18 ENCOUNTER — MYC MEDICAL ADVICE (OUTPATIENT)
Dept: FAMILY MEDICINE | Facility: CLINIC | Age: 67
End: 2022-04-18
Payer: MEDICARE

## 2022-04-18 ENCOUNTER — TELEPHONE (OUTPATIENT)
Dept: FAMILY MEDICINE | Facility: CLINIC | Age: 67
End: 2022-04-18
Payer: MEDICARE

## 2022-04-18 NOTE — TELEPHONE ENCOUNTER
Left message to call back. Ask patient PCP preference. [facility] or [facility]? Home care orders to be signed by patient's PCP in [facility] office.  Sumit Laws RN

## 2022-04-18 NOTE — TELEPHONE ENCOUNTER
Per patient PCP Karen Santos MD. Chart updated. Orders form in Dr. Santos's office for signature.   Sumit Laws RN

## 2022-04-21 ENCOUNTER — MEDICAL CORRESPONDENCE (OUTPATIENT)
Dept: HEALTH INFORMATION MANAGEMENT | Facility: CLINIC | Age: 67
End: 2022-04-21
Payer: MEDICARE

## 2022-04-29 ENCOUNTER — OFFICE VISIT (OUTPATIENT)
Dept: URGENT CARE | Facility: URGENT CARE | Age: 67
End: 2022-04-29
Payer: MEDICARE

## 2022-04-29 VITALS
HEART RATE: 59 BPM | TEMPERATURE: 98.6 F | OXYGEN SATURATION: 98 % | SYSTOLIC BLOOD PRESSURE: 113 MMHG | DIASTOLIC BLOOD PRESSURE: 65 MMHG

## 2022-04-29 DIAGNOSIS — R30.0 DYSURIA: Primary | ICD-10-CM

## 2022-04-29 DIAGNOSIS — N30.00 ACUTE CYSTITIS WITHOUT HEMATURIA: ICD-10-CM

## 2022-04-29 LAB
ALBUMIN UR-MCNC: 30 MG/DL
APPEARANCE UR: ABNORMAL
BACTERIA #/AREA URNS HPF: ABNORMAL /HPF
BILIRUB UR QL STRIP: NEGATIVE
COLOR UR AUTO: YELLOW
GLUCOSE UR STRIP-MCNC: NEGATIVE MG/DL
HGB UR QL STRIP: ABNORMAL
KETONES UR STRIP-MCNC: NEGATIVE MG/DL
LEUKOCYTE ESTERASE UR QL STRIP: ABNORMAL
NITRATE UR QL: POSITIVE
PH UR STRIP: 7.5 [PH] (ref 5–7)
RBC #/AREA URNS AUTO: ABNORMAL /HPF
SP GR UR STRIP: 1.01 (ref 1–1.03)
SQUAMOUS #/AREA URNS AUTO: ABNORMAL /LPF
UROBILINOGEN UR STRIP-ACNC: 0.2 E.U./DL
WBC #/AREA URNS AUTO: ABNORMAL /HPF

## 2022-04-29 PROCEDURE — 99214 OFFICE O/P EST MOD 30 MIN: CPT | Performed by: PHYSICIAN ASSISTANT

## 2022-04-29 PROCEDURE — 87086 URINE CULTURE/COLONY COUNT: CPT | Performed by: PHYSICIAN ASSISTANT

## 2022-04-29 PROCEDURE — 81001 URINALYSIS AUTO W/SCOPE: CPT

## 2022-04-29 RX ORDER — CIPROFLOXACIN 500 MG/1
500 TABLET, FILM COATED ORAL 2 TIMES DAILY
Qty: 14 TABLET | Refills: 0 | Status: SHIPPED | OUTPATIENT
Start: 2022-04-29 | End: 2022-05-06

## 2022-04-30 NOTE — PATIENT INSTRUCTIONS
STOP the tizanidine while on Cipro  Fluids and rest  If fever, chills, vomiting, weakness or worsening symptoms please follow-up right away.

## 2022-04-30 NOTE — PROGRESS NOTES
Assessment & Plan     1. Dysuria  - UA reflex to Microscopic and Culture  - Urine Microscopic Exam  - Urine Culture    2. Acute cystitis without hematuria  UA is grossly +  She does have CVA tenderness B/L, but suspect this is related to muscular pain   Will treat her with Cipro to cover for pyelonephritis. She also was recently treated with Bactrim on 4/1.  Initially, I would like to choose a different antibiotic given the interaction with Tizanidine, but patient reports this is PRN and she will STOP taking it. We reviewed SE and interactions.  Encouraged fluids  Discussed indications for follow-up   - ciprofloxacin (CIPRO) 500 MG tablet; Take 1 tablet (500 mg) by mouth 2 times daily for 7 days  Dispense: 14 tablet; Refill: 0        Return in about 2 days (around 5/1/2022), or if symptoms worsen or fail to improve.    Diagnosis and treatment plan was reviewed with patient and/or family.   We went over any labs or imaging. Discussed worsening symptoms or little to no relief despite treatment plan to follow-up with PCP or return to clinic.  Patient verbalizes understanding. All questions were addressed and answered.     Rosalva Cabrera PA-C  [facility]    CHIEF COMPLAINT:   Chief Complaint   Patient presents with     UTI     Bladder spasms, hx of  utis     Subjective     Bhavani is a 66 year old female who presents to clinic today for evaluation of UTI. Symptoms started today. Feeling bladder spasms. Denies having fever, chills, flank pain, nausea, vomiting, hematuria or weakness.  Vaginal discharge, itching or pain are not present.     Past Medical History:   Diagnosis Date     Abdominal wall cellulitis 03/16/2020     Abdominopelvic abscess (H) 02/01/2020     Anxiety      Asthma      Depression      DM (diabetes mellitus) (H) 2003    with peripheral neuropathy     Fracture of lower extremity 06/26/2013     Frequent UTI      History of blood transfusion      History of ulcer disease  2014    She notes from NSAIDs; nearly . Discussed a hospitalization at [place] for this.     Hypertension      Hypothyroid      Iatrogenic Cushing's disease (H)     off prednisone now     Infection due to alpha-hemolytic Streptococcus 2020     Morbid obesity (H)      MRSA (methicillin resistant staph aureus) culture positive 2012    repeat cx 10/12/2012 no staph mentioned.     ANIYA -- noncompliant with CPAP      Osteoarthritis     multiple joings.     Other chronic pain      Perforated duodenal ulcer -- S/P Surgery 2019     SBO (small bowel obstruction) (H) 2020     Sepsis (WBC 20K, ) 2020     Sleep apnea     No longer uses cpap.     Past Surgical History:   Procedure Laterality Date     ABDOMEN SURGERY       APPLY WOUND VAC  2019    Procedure: Apply Wound Vac;  Surgeon: Ayan Lopez MD;  Location: [facility] OR     CLOSE SECONDARY WOUND ABDOMEN N/A 2020    Procedure: SECONDARY ABDOMINAL WOUND CLOSURE;  Surgeon: Ayan Lopez MD;  Location: [facility] OR     CYSTOSCOPY N/A 2018    Procedure: CYSTOSCOPY;  Cystoscopy and Botox Injection Into the Bladder and suprapubic tube exchange;  Surgeon: Yoandy Rios MD;  [facility]     CYSTOSCOPY N/A 3/15/2019    Procedure: Cystoscopy, suprapubic tube exchange;  Surgeon: Yoandy Rios MD;  [facility]     CYSTOSCOPY FLEXIBLE, CYSTOSTOMY, INSERT TUBE SUPRAPUBIC, COMBINED N/A 2019    Procedure: Cystoscopy, Bladder Botox Injection, Suprapubic Tube Change;  Surgeon: Yoandy Rios MD;  [facility]     CYSTOSCOPY, INTRAVESICAL INJECTION N/A 2017    Procedure: CYSTOSCOPY, INTRAVESICAL INJECTION;  Cystoscopy, Botox Injection Into The Bladder  Latex Allergy ;  Surgeon: Yoandy Rios MD;  [facility]     CYSTOSCOPY, INTRAVESICAL INJECTION N/A 3/8/2018    Procedure: CYSTOSCOPY, INTRAVESICAL INJECTION;  Cystoscopy, Botox Injection Into The Bladder and  suprapubic catheter exchange;  Surgeon: Yoandy Rios MD;  [facility]     DISCECTOMY, FUSION CERVICAL ANTERIOR THREE+ LEVELS, COMBINED Left 5/3/2016    Procedure: COMBINED DISCECTOMY, FUSION CERVICAL ANTERIOR THREE+ LEVELS;  Surgeon: Jasvir Torres MD;  [facility]     ENTEROSCOPY SMALL BOWEL N/A 6/8/2020    Procedure: Enteroscopy small bowel;  Surgeon: Gabe Forte MD;  Location:  GI     GASTROSTOMY, INSERT TUBE, COMBINED N/A 1/1/2020    Procedure: GASTRIC-JEJUNAL FEEDING TUBE INSERTION;  Surgeon: Ayan Lopez MD;  Location:  OR     GENITOURINARY SURGERY      suprapubic catheter     HERNIA REPAIR  1957    double hernia     HYSTERECTOMY, PAP NO LONGER INDICATED      CELIA and large ovarian tumor removed     INJECT BOTOX N/A 9/21/2018    Procedure: INJECT BOTOX;;  Surgeon: Yoandy Rios MD;  [facility]     INJECT BOTOX N/A 3/15/2019    Procedure: Inject Botox into Bladder;  Surgeon: Yoandy Rios MD;  [facility]     IR GASTRO JEJUNOSTOMY TUBE PLACEMENT  6/11/2020     IR GASTROSTOMY TUBE CHANGE  6/30/2020     IR JEJUNOSTOMY TUBE CHANGE  2/4/2020     IR JEJUNOSTOMY TUBE CHANGE  2/19/2020     LAPAROSCOPY DIAGNOSTIC (GENERAL) N/A 12/30/2019    Procedure: Laparoscopy diagnostic (general);  Surgeon: Ayan Lopez MD;  Location:  OR     LAPAROTOMY EXPLORATORY N/A 1/1/2020    Procedure: EXPLORATORY LAPAROTOMY;  Surgeon: Ayan Lopez MD;  Location:  OR     LAPAROTOMY EXPLORATORY N/A 12/30/2019    Procedure: LAPAROTOMY, REPAIR OF ULCER PERFORATION;  Surgeon: Ayan Lopez MD;  Location: [facility] OR     SURGICAL HISTORY OF -       left cataract surgery     SURGICAL HISTORY OF -   12/14    right cataract surgery and left laser revision     SURGICAL HISTORY OF -   March 2015    left carpal tunnel release     TONSILLECTOMY  1974     ZZC LAMINECTOMY,FACETECTOMY,LUMBAR  1982     ZZC TOTAL KNEE ARTHROPLASTY  1999    left     Social  "History     Tobacco Use     Smoking status: Never Smoker     Smokeless tobacco: Never Used   Substance Use Topics     Alcohol use: No     Current Outpatient Medications   Medication     albuterol (PROVENTIL) (2.5 MG/3ML) 0.083% neb solution     aspirin 81 MG EC tablet     bisacodyl (DULCOLAX) 10 MG suppository     ciprofloxacin (CIPRO) 500 MG tablet     desonide (DESOWEN) 0.05 % external ointment     DULoxetine (CYMBALTA) 60 MG capsule     fluticasone-salmeterol (ADVAIR) 250-50 MCG/DOSE inhaler     fluticasone-vilanterol (BREO ELLIPTA) 100-25 MCG/INH inhaler     gabapentin (NEURONTIN) 800 MG tablet     hydrOXYzine (ATARAX) 50 MG tablet     hypromellose (ARTIFICIAL TEARS) 0.5 % SOLN ophthalmic solution     ipratropium - albuterol 0.5 mg/2.5 mg/3 mL (DUONEB) 0.5-2.5 (3) MG/3ML neb solution     ketoconazole (NIZORAL) 2 % external shampoo     levothyroxine (SYNTHROID/LEVOTHROID) 88 MCG tablet     lisinopril (ZESTRIL) 2.5 MG tablet     metFORMIN (GLUCOPHAGE-XR) 500 MG 24 hr tablet     methadone (DOLOPHINE) 10 MG tablet     miconazole (MICATIN/MICRO GUARD) 2 % external powder     multivitamin, therapeutic (THERA-VIT) TABS tablet     ondansetron (ZOFRAN-ODT) 4 MG ODT tab     oxybutynin ER (DITROPAN XL) 15 MG 24 hr tablet     pantoprazole (PROTONIX) 40 MG EC tablet     polyethylene glycol (MIRALAX/GLYCOLAX) packet     rosuvastatin (CRESTOR) 5 MG tablet     senna-docusate (SENOKOT-S/PERICOLACE) 8.6-50 MG tablet     tiZANidine (ZANAFLEX) 4 MG tablet     traZODone (DESYREL) 100 MG tablet     VENTOLIN  (90 Base) MCG/ACT inhaler     No current facility-administered medications for this visit.     Allergies   Allergen Reactions     Povidone Iodine      \"mild skin irritation\" per patient report     Toradol [Ketorolac] Other (See Comments)     Pt gets nightmares     Tramadol Other (See Comments)       10 point ROS of systems were all negative except for pertinent positives noted in my HPI.      Exam:   /65   Pulse 59  "  Temp 98.6  F (37  C) (Tympanic)   SpO2 98%   Constitutional: healthy, alert and no distress  ENT: MMM  Cardiovascular: RRR  Respiratory: CTA bilaterally, no rhonchi or rales  Gastrointestinal: soft and nontender  Back: + CVA tenderness B/L  Skin: no rashes      Results for orders placed or performed in visit on 04/29/22   UA reflex to Microscopic and Culture     Status: Abnormal    Specimen: Urine, NOS   Result Value Ref Range    Color Urine Yellow Colorless, Straw, Light Yellow, Yellow    Appearance Urine Cloudy (A) Clear    Glucose Urine Negative Negative mg/dL    Bilirubin Urine Negative Negative    Ketones Urine Negative Negative mg/dL    Specific Gravity Urine 1.015 1.003 - 1.035    Blood Urine Moderate (A) Negative    pH Urine 7.5 (H) 5.0 - 7.0    Protein Albumin Urine 30  (A) Negative mg/dL    Urobilinogen Urine 0.2 0.2, 1.0 E.U./dL    Nitrite Urine Positive (A) Negative    Leukocyte Esterase Urine Small (A) Negative   Urine Microscopic Exam     Status: Abnormal   Result Value Ref Range    Bacteria Urine Many (A) None Seen /HPF    RBC Urine 0-2 0-2 /HPF /HPF    WBC Urine 25-50 (A) 0-5 /HPF /HPF    Squamous Epithelials Urine Few (A) None Seen /LPF

## 2022-05-02 LAB — BACTERIA UR CULT: NORMAL

## 2022-05-05 ENCOUNTER — TELEPHONE (OUTPATIENT)
Dept: FAMILY MEDICINE | Facility: CLINIC | Age: 67
End: 2022-05-05
Payer: MEDICARE

## 2022-05-05 ENCOUNTER — MEDICAL CORRESPONDENCE (OUTPATIENT)
Dept: HEALTH INFORMATION MANAGEMENT | Facility: CLINIC | Age: 67
End: 2022-05-05
Payer: MEDICARE

## 2022-05-05 NOTE — TELEPHONE ENCOUNTER
Pt calls,     S-(situation): [employer] faxing form    B-(background): needs new wheelchair cushion, need order from PCP, pt wants ASAP and wonders if another provider ([facility]) can sign today, could not locate form yet, radiology fax gal will watch for fax    A-(assessment): DME order form    R-(recommendations): routed to [employer], please watch for fax from [employer] for wheelchair cushion, inform pt when final    Ekta Mccarthy RN, BSN  [facility]

## 2022-05-06 NOTE — TELEPHONE ENCOUNTER
Form completed by [facility] and faxed with last visit note, 05/06/2022    Angelia Calvin/EMELY

## 2022-05-09 ENCOUNTER — TELEPHONE (OUTPATIENT)
Dept: FAMILY MEDICINE | Facility: CLINIC | Age: 67
End: 2022-05-09
Payer: MEDICARE

## 2022-05-09 ENCOUNTER — TELEPHONE (OUTPATIENT)
Dept: UROLOGY | Facility: CLINIC | Age: 67
End: 2022-05-09
Payer: MEDICARE

## 2022-05-09 NOTE — TELEPHONE ENCOUNTER
Please approve this for another year.   Is there a form to fill out or is verbal from you okay ?

## 2022-05-09 NOTE — TELEPHONE ENCOUNTER
[facility] Call Center    Phone Message    May a detailed message be left on voicemail: yes     Reason for Call: Lola from [facility] calling because patient has had frequent UTIs and has been treated 2x in the last 2 months. Lola wondering if patient should take a prophylactic antibiotic. Please reach out to Lola. Thank you    Action Taken: Message routed to:  [facility] ([facility] Uro    Travel Screening: Not Applicable

## 2022-05-09 NOTE — TELEPHONE ENCOUNTER
"Medicare regulation states orders need to be updated every 60 days per Home Care RN.    Verbal order given \"to continue same plan of care monthly visits 2 prn visits for catheter and checking for uti if pt is symptomatic.\"    Home Care will fax orders over for signature.    Lola Wynn, RN     "

## 2022-05-09 NOTE — TELEPHONE ENCOUNTER
Reason for Call: Request for an order or referral:    Order or referral being requested: re certification to continue same plan of care monthly visits 2 prn visits for catheter and checking for uti if pt is symptomatic.    Date needed: as soon as possible    Has the patient been seen by the PCP for this problem? YES    Additional comments: n/a    Phone number Patient can be reached at:  Other phone number:  [phone]    Best Time:  anytime    Can we leave a detailed message on this number?  YES    Call taken on 5/9/2022 at 10:58 AM by Wanda Ortega

## 2022-05-10 NOTE — TELEPHONE ENCOUNTER
Spoke to pt and informed her of home care nurse request. Pt agrees to schedule a visit to discuss this further with provider. No other questions noted.     Anjali Alexander RN MSN

## 2022-05-12 ENCOUNTER — MEDICAL CORRESPONDENCE (OUTPATIENT)
Dept: HEALTH INFORMATION MANAGEMENT | Facility: CLINIC | Age: 67
End: 2022-05-12

## 2022-05-13 ENCOUNTER — TELEPHONE (OUTPATIENT)
Dept: FAMILY MEDICINE | Facility: CLINIC | Age: 67
End: 2022-05-13
Payer: MEDICARE

## 2022-05-13 NOTE — TELEPHONE ENCOUNTER
Received 7 page fax for Home Health Certification and Plan of Care for Dr Nguyễn to complete. Form in the in-basket at GERMAN's desk.

## 2022-05-16 DIAGNOSIS — Z53.9 DIAGNOSIS NOT YET DEFINED: Primary | ICD-10-CM

## 2022-05-16 PROCEDURE — G0179 MD RECERTIFICATION HHA PT: HCPCS | Performed by: FAMILY MEDICINE

## 2022-05-16 NOTE — TELEPHONE ENCOUNTER
It looks like I am to addend my encounter from 2 months ago to justify a cushion which I am guessing in for her wheelchair but not sure.   I do not remember ordering it and also did not discuss at that appointment or document.    Maybe a phone visit?

## 2022-05-16 NOTE — TELEPHONE ENCOUNTER
Left message for pt.   -Advised that pt needs to schedule a virtual visit with  to get the cushion forms completed.   -Provided clinic line [phone].    Gabriela Che, RN, BSN, PHN  [facility]

## 2022-05-18 NOTE — TELEPHONE ENCOUNTER
Ursula garg from [facility].  They only received part of fax.  Please refax.  Lakisha Marie RN

## 2022-05-23 ENCOUNTER — TELEPHONE (OUTPATIENT)
Dept: FAMILY MEDICINE | Facility: CLINIC | Age: 67
End: 2022-05-23
Payer: MEDICARE

## 2022-05-23 NOTE — TELEPHONE ENCOUNTER
Fax received from [employer] for home health Certification and plan of care .  Please complete and fax back.   This will be placed in [facility] In box being that it needs an MD signature.    Treva Xiong

## 2022-05-23 NOTE — TELEPHONE ENCOUNTER
The encounter in question was over 2 months ago and a cushion was not discussed.   I addended the note and have DME printed and signed.   If this does not work we may have to have her come in for visit with me or nurse visit to make this happen

## 2022-06-02 ENCOUNTER — TELEPHONE (OUTPATIENT)
Dept: FAMILY MEDICINE | Facility: CLINIC | Age: 67
End: 2022-06-02

## 2022-06-02 NOTE — TELEPHONE ENCOUNTER
Karen Santos MD  [employer] (Rn)  I thought she was going through [facility] [employer] for this cushion.    Can you make sure this is the case and then perhaps she does not need to OT and rehab?   Not sure what this is about.   Maybe I put in wrong order?             Previous Messages       ----- Message -----   From: Agnes Herbert   Sent: 6/1/2022   3:06 PM CDT   To: Karen Santos MD   Subject: PHI OT needed                                     Hello,   We received an order for the above patient for wheelchair and seating needs. Please update the order to include 'occupational therapy' as our OT specialist will be meeting with the patient for these needs.   Thank you for your help and understanding!   Sincerely, the Rehab Central Scheduling Team

## 2022-06-02 NOTE — TELEPHONE ENCOUNTER
Dr. Nguyễn     Patient already has a wheelchair and the cushion you ordered so if you get any further inbasket messages regarding this please disregard and advise them of this     Wendy Pearson, Registered Nurse  [facility]

## 2022-06-16 ENCOUNTER — TELEPHONE (OUTPATIENT)
Dept: FAMILY MEDICINE | Facility: CLINIC | Age: 67
End: 2022-06-16

## 2022-06-16 NOTE — TELEPHONE ENCOUNTER
Received 1 page fax for Standard Written Orders:  Repairs for Dr Nguyễn to complete. Form in the in-basket at [facility]ERMAN's desk.    Angelia Calvin/EMELY

## 2022-06-17 ENCOUNTER — TELEPHONE (OUTPATIENT)
Dept: FAMILY MEDICINE | Facility: CLINIC | Age: 67
End: 2022-06-17

## 2022-06-17 NOTE — TELEPHONE ENCOUNTER
Received 4 page fax for Reasonable Accomodation/Modification Request Verification for Dr Nguyễn to complete. Form in the in-basket at [facility]ERMAN's desk.

## 2022-06-18 ENCOUNTER — MYC MEDICAL ADVICE (OUTPATIENT)
Dept: FAMILY MEDICINE | Facility: CLINIC | Age: 67
End: 2022-06-18
Payer: MEDICARE

## 2022-06-20 ENCOUNTER — MEDICAL CORRESPONDENCE (OUTPATIENT)
Dept: FAMILY MEDICINE | Facility: CLINIC | Age: 67
End: 2022-06-20

## 2022-06-20 NOTE — TELEPHONE ENCOUNTER
June 18, 2022    Bhavani White  to Karen Santos MD          5:23 PM  You should have received a fax from [facility] on Friday. We just moved to [place]. We need a grab bar in the bathroom shower. We also need corner protectors throughout to protect them from my sometimes wayward power chair.  Thank you,   Bhavani White

## 2022-06-21 ENCOUNTER — PRE VISIT (OUTPATIENT)
Dept: UROLOGY | Facility: CLINIC | Age: 67
End: 2022-06-21
Payer: MEDICARE

## 2022-06-21 DIAGNOSIS — N31.9 NEUROGENIC BLADDER: Primary | ICD-10-CM

## 2022-06-21 NOTE — TELEPHONE ENCOUNTER
Reason for visit: Medication check      Relevant information: Neurogenic bladder, SPT    Records/imaging/labs/orders: orders placed per protocol    Pt called: message routed to scheduling to set up imaging and schedule with Amanda Dinero PA-C - per provider      Ivet Snow CMA  6/21/2022  5:43 PM

## 2022-06-22 NOTE — PROGRESS NOTES
"General Surgery Progress Note    Admission Date: 6/3/2020  Today's Date: 6/6/2020         Assessment:      Bhavani White is a 64 year old female with a complex surgical history admitted 6/3 with small bowel obstruction. Now passing gas, minimal NG output, tolerating sips of clear liquids. Abdominal pain significantly improved.         Plan:   - NG tube removed at bedside. Start slowly with clear liquids this morning, can advance to full liquids later today if tolerates clears well. Do not advance to solid food yet.  - Give suppository this morning  - Discussed with patient the need to keep stools soft, avoid constipation, low fiber diet for the next few weeks and potentially long term to avoid recurrent bowel obstructions. Will have dietician meet with patient for low fiber diet education  - Medical management per hospitalist  - Continue working with OT, activity as able  - If patient worsens with NG tube out or with oral intake, would consider contrast challenge to assess resolution of SBO. Discussed with patient who is in agreement with plan        Interval History:   Afebrile overnight, VSS on room air except some hypertension. Patient reports significant improvement in her abdominal pain from admission. Still some mild pain present, no nausea. Tolerating some clear liquids. Passing gas. Minimal NG output when reconnected to suction during clamping trials. Main complaint is chronic back pain, takes methadone at home.          Physical Exam:   BP (!) 154/75 (BP Location: Left arm)   Pulse 72   Temp 97.9  F (36.6  C) (Oral)   Resp 16   Ht 1.702 m (5' 7\")   Wt 94.5 kg (208 lb 6.4 oz)   SpO2 99%   BMI 32.64 kg/m    I/O last 3 completed shifts:  In: 600 [I.V.:600]  Out: 1275 [Urine:1275]  General: NAD, pleasant, alert and oriented x3  Respiratory: non-labored breathing   Abdomen: soft, mild diffuse tenderness. No significant distention, no rebound or guarding. Numerous surgical scars.  Extremities: no lower " RT Protocol Note  Cordelia Light Festus 69 y o  male MRN: [id number]  Unit/Bed#: TR 03 Encounter: [id number]    Assessment    Principal Problem:    Tongue swelling  Active Problems:    GERD without esophagitis    Hyperlipidemia    Hypertension    Stage 3 chronic kidney disease (HCC)    URI (upper respiratory infection)      Home Pulmonary Medications:  none       Past Medical History:   Diagnosis Date    Abdominal pain, acute, generalized     last assessed - 73Ing1528    Actinic keratosis     last assessed - 58Nly9605    Chest pain     last assessed - 38Pgr6792    Chronic kidney disease, stage 3 (HCC)     Colon polyp     Disease of thyroid gland     Dysfunction of both eustachian tubes     suspect due to ETD  Recommend otc allergy meds and if fails then add flonase; last assessed - 05Qtp0333    Edema     Essential hypertension     Fatigue     last assessed - 11BTJ9616    Generalized anxiety disorder     GERD (gastroesophageal reflux disease)     Hematuria     last assessed - 95Iat4184    Hypertension     Hypo-osmolality and hyponatremia     Hypocalcemia     Hypoparathyroidism ([address] )     Hypothyroidism     Lightheadedness     last assessed - 84ROH3120    Lump in neck     ? cause  Maybe a localized allergic reaction, dental issue, doubt sialdenitis, ect  Empiric abx and one dose steroids  Continue benadryl  If worsens, to er or ent      Malaise and fatigue     last assessed - 96Tfn2736    Nephropathy     last assessed - 45Ttw4787    Nocturia     Proteinuria     Shortness of breath     last assessed - 49UCV7348    Skin lesion     Resolved - 45JBH9087    Tinea corporis     last assessed - 20XLS8598     Social History     Socioeconomic History    Marital status: /Civil Union     Spouse name: None    Number of children: None    Years of education: None    Highest education level: None   Occupational History    Occupation: Retired     Comment:     Tobacco Use    Smoking status: Never Smoker    Smokeless tobacco: Never Used   Vaping Use    Vaping Use: Never used   Substance and Sexual Activity    Alcohol use: Yes     Comment: rarely    Drug use: No    Sexual activity: None   Other Topics Concern    None   Social History Narrative    Consumes on average 1 cup of regular coffee per day      Social Determinants of Health     Financial Resource Strain: Not on file   Food Insecurity: Not on file   Transportation Needs: Not on file   Physical Activity: Not on file   Stress: Not on file   Social Connections: Not on file   Intimate Partner Violence: Not on file   Housing Stability: Not on file       Subjective         Objective    Physical Exam:   Assessment Type: (P) Assess only  General Appearance: (P) Alert, Awake  Respiratory Pattern: (P) Normal  Chest Assessment: (P) Chest expansion symmetrical  Bilateral Breath Sounds: (P) Clear    Vitals:  Blood pressure 162/73, pulse 92, temperature 98 1 °F (36 7 °C), temperature source Temporal, resp  rate 16, height 6' 2" (1 88 m), weight 89 4 kg (197 lb), SpO2 95 %  Imaging and other studies: I have personally reviewed pertinent reports  Plan    Respiratory Plan: (P) Discontinue Protocol        Resp Comments: (P) Pt evaluated per respiratory protoca as ordered  Pt seen for tongue swelling  Pt observed on rma with no sob, no stridor or trouble swollowing  Pt has no other pulm hx  No bronchodilator therpay indicated  extremity edema, no calf tenderness. PCDs in place    LABS:  Recent Labs   Lab Test 06/06/20  0750 06/05/20  0756 06/04/20  0725   WBC 10.0 8.6 10.5   HGB 11.5* 11.6* 11.4*   MCV 72* 74* 74*    264 272      Recent Labs   Lab Test 06/05/20  0756 06/04/20  0725 06/03/20  1442   POTASSIUM 3.5 3.5 3.7   CHLORIDE 103 102 95   CO2 34* 36* 35*   BUN 20 21 19   CR 0.74 0.78 0.68   ANIONGAP 5 4 6               -------------------------------    Lola Jimenez PA-C  [facility]  [phone]

## 2022-06-30 ENCOUNTER — OFFICE VISIT (OUTPATIENT)
Dept: FAMILY MEDICINE | Facility: CLINIC | Age: 67
End: 2022-06-30
Payer: MEDICARE

## 2022-06-30 VITALS
DIASTOLIC BLOOD PRESSURE: 60 MMHG | SYSTOLIC BLOOD PRESSURE: 108 MMHG | HEART RATE: 57 BPM | WEIGHT: 246.6 LBS | BODY MASS INDEX: 41.09 KG/M2 | TEMPERATURE: 97.3 F | HEIGHT: 65 IN | OXYGEN SATURATION: 96 % | RESPIRATION RATE: 16 BRPM

## 2022-06-30 DIAGNOSIS — I50.32 CHRONIC DIASTOLIC (CONGESTIVE) HEART FAILURE (H): ICD-10-CM

## 2022-06-30 DIAGNOSIS — N31.9 NEUROGENIC BLADDER: ICD-10-CM

## 2022-06-30 DIAGNOSIS — Z01.818 PREOP GENERAL PHYSICAL EXAM: Primary | ICD-10-CM

## 2022-06-30 DIAGNOSIS — E11.21 TYPE 2 DIABETES MELLITUS WITH DIABETIC NEPHROPATHY, UNSPECIFIED WHETHER LONG TERM INSULIN USE (H): ICD-10-CM

## 2022-06-30 DIAGNOSIS — J45.30 MILD PERSISTENT ASTHMA WITHOUT COMPLICATION: ICD-10-CM

## 2022-06-30 DIAGNOSIS — Z41.9 ELECTIVE SURGERY: ICD-10-CM

## 2022-06-30 DIAGNOSIS — E03.4 HYPOTHYROIDISM DUE TO ACQUIRED ATROPHY OF THYROID: ICD-10-CM

## 2022-06-30 DIAGNOSIS — K21.9 GASTROESOPHAGEAL REFLUX DISEASE WITHOUT ESOPHAGITIS: ICD-10-CM

## 2022-06-30 DIAGNOSIS — F33.42 RECURRENT MAJOR DEPRESSIVE DISORDER, IN FULL REMISSION (H): ICD-10-CM

## 2022-06-30 DIAGNOSIS — F11.20 METHADONE MAINTENANCE THERAPY PATIENT (H): ICD-10-CM

## 2022-06-30 DIAGNOSIS — N18.2 CHRONIC KIDNEY DISEASE, STAGE 2 (MILD): ICD-10-CM

## 2022-06-30 DIAGNOSIS — I21.4 NSTEMI (NON-ST ELEVATED MYOCARDIAL INFARCTION) (H): ICD-10-CM

## 2022-06-30 DIAGNOSIS — E66.01 MORBID OBESITY, UNSPECIFIED OBESITY TYPE (H): ICD-10-CM

## 2022-06-30 LAB
ERYTHROCYTE [DISTWIDTH] IN BLOOD BY AUTOMATED COUNT: 14.3 % (ref 10–15)
HCT VFR BLD AUTO: 41.3 % (ref 35–47)
HGB BLD-MCNC: 12.9 G/DL (ref 11.7–15.7)
MCH RBC QN AUTO: 25.8 PG (ref 26.5–33)
MCHC RBC AUTO-ENTMCNC: 31.2 G/DL (ref 31.5–36.5)
MCV RBC AUTO: 83 FL (ref 78–100)
PLATELET # BLD AUTO: 257 10E3/UL (ref 150–450)
RBC # BLD AUTO: 5 10E6/UL (ref 3.8–5.2)
WBC # BLD AUTO: 8.1 10E3/UL (ref 4–11)

## 2022-06-30 PROCEDURE — 85027 COMPLETE CBC AUTOMATED: CPT | Performed by: PHYSICIAN ASSISTANT

## 2022-06-30 PROCEDURE — 80048 BASIC METABOLIC PNL TOTAL CA: CPT | Performed by: PHYSICIAN ASSISTANT

## 2022-06-30 PROCEDURE — 36415 COLL VENOUS BLD VENIPUNCTURE: CPT | Performed by: PHYSICIAN ASSISTANT

## 2022-06-30 PROCEDURE — 99215 OFFICE O/P EST HI 40 MIN: CPT | Performed by: PHYSICIAN ASSISTANT

## 2022-06-30 ASSESSMENT — PAIN SCALES - GENERAL: PAINLEVEL: MODERATE PAIN (5)

## 2022-06-30 NOTE — PROGRESS NOTES
[address], [address]  [facility] [address]  Phone: [phone]  Primary Provider: Karen Santos  Pre-op Performing Provider: CATHLEEN NARAYAN      PREOPERATIVE EVALUATION:  Today's date: 6/30/2022    Bhavani White is a 66 year old female who presents for a preoperative evaluation.    Surgical Information:  Surgery/Procedure: teeth implants  Surgery Location: [facility]  Surgeon:   Surgery Date: TBD  Time of Surgery: TBD  Where patient plans to recover: At home with family  Fax number for surgical facility: [phone]    Type of Anesthesia Anticipated: Local    Assessment & Plan     The proposed surgical procedure is considered LOW risk.      (Z01.818) Preop general physical exam  (primary encounter diagnosis)  Comment: able to do 4 METs, chronic issues stable  Plan: CBC with platelets, Basic metabolic panel  (Ca,        Cl, CO2, Creat, Gluc, K, Na, BUN)  Cleared for minor procedure     (Z41.9) Elective surgery  Comment: will have dental implants placed next week  Plan: procedure as planned     (J45.30) Mild persistent asthma without complication  Comment: uses breo  Plan: cont above    (E03.4) Hypothyroidism due to acquired atrophy of thyroid  Comment: on levothyroxine 88mcg, recent tsh 1.56  Plan: cont above    (N18.2) Chronic kidney disease, stage 2 (mild)  Comment:   Plan: BMP    (I50.32) Chronic diastolic (congestive) heart failure (H)  Comment: stable, recent EKG unchanged   Plan:     (K21.9) Gastroesophageal reflux disease without esophagitis  Comment: perf gastric ulcer in 2019  Plan: protonix     (E66.01) Morbid obesity, unspecified obesity type (H)  Comment:   Plan: Hx of sleep apnea, please monitor 02 sats and cardiopulm status closely in obed-operative period     (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) Methadone maintenance therapy patient (H)  Comment: follows with pain clinic  Plan: patient instructed to discuss obed-procedure pain management plan  with pain team    (E11.21) Type 2 diabetes mellitus with diabetic nephropathy, unspecified whether long term insulin use (H)  Comment: on metformin bid  Plan: hold am dose the morning of procedure     (F33.42) Recurrent major depressive disorder, in full remission (H)  Comment:stable on cymbalta  Plan:     (I21.4) NSTEMI (non-ST elevated myocardial infarction) (H)  Comment: 2019, cMRI 5/5/20 w/LVEF of 65%, follows with cardiology through [facility]  Plan: cont w/ cardiology, referral placed since she hasn't seen in a few years    (N31.9) Neurogenic bladder, S/P Suprapubic Catheter   Comment:   Plan:     Medication Instructions:  Please call your pain doctor to discuss obed-operative pain medication plan.    Stop all NSAIDs (motrin, ibuprofen, naproxen, aleve, advil, naprosyn, aspirin)  for at least 7 days prior to surgery.    Take lisinopril, cymbalta, gabapentin, levothyroxine and oxybutynin with a sip of water.  Use your Breo inhaler the am of surgery.  (Discuss am dose of methadone with your pain doctor.)     Skip vitamin and metformin the am of surgery.    Take the rest of your evening medications as usual the night before surgery.    RECOMMENDATION:  APPROVAL GIVEN to proceed with proposed procedure, without further diagnostic evaluation.    Tere Shen PA-C  55 minutes on the day of the encounter doing chart review, history and exam, documentation and further activities as noted above.      Subjective     HPI related to upcoming procedure: Bhavani is here for pre-op for dental implants.    She has chronic pain but exercises regularly.  She does the stationary bike 2-3 times per week for 5-10 minutes per time.  She denies chest pain with exercise.      See ROS below   Preop Questions 6/30/2022   1. Have you ever had a heart attack or stroke? YES - 2016, heart attack   2. Have you ever had surgery on your heart or blood vessels, such as a stent placement, a coronary artery bypass, or surgery on an  artery in your head, neck, heart, or legs? No   3. Do you have chest pain with activity? No   4. Do you have a history of  heart failure? No   5. Do you currently have a cold, bronchitis or symptoms of other infection? No   6. Do you have a cough, shortness of breath, or wheezing? No   7. Do you or anyone in your family have previous history of blood clots? No   8. Do you or does anyone in your family have a serious bleeding problem such as prolonged bleeding following surgeries or cuts? No   9. Have you ever had problems with anemia or been told to take iron pills? No   10. Have you had any abnormal blood loss such as black, tarry or bloody stools, or abnormal vaginal bleeding? No   11. Have you ever had a blood transfusion? YES -    11a. Have you ever had a transfusion reaction? No   12. Are you willing to have a blood transfusion if it is medically needed before, during, or after your surgery? Yes   13. Have you or any of your relatives ever had problems with anesthesia? No   14. Do you have sleep apnea, excessive snoring or daytime drowsiness? No   15. Do you have any artifical heart valves or other implanted medical devices like a pacemaker, defibrillator, or continuous glucose monitor? No   16. Do you have artificial joints? YES -    17. Are you allergic to latex? No   18. Is there any chance that you may be pregnant? -     Health Care Directive:  Patient has a Health Care Directive on file    Review of Systems  CONSTITUTIONAL: NEGATIVE for fever, chills, change in weight  INTEGUMENTARY/SKIN: NEGATIVE for worrisome rashes, moles or lesions  EYES: NEGATIVE for vision changes or irritation  ENT/MOUTH: NEGATIVE for ear, mouth and throat problems  RESP: NEGATIVE for significant cough or SOB  CV: NEGATIVE for chest pain, palpitations or peripheral edema  GI: NEGATIVE for nausea, abdominal pain, heartburn, or change in bowel habits  : NEGATIVE for frequency, dysuria, or hematuria  MUSCULOSKELETAL: NEGATIVE for  significant arthralgias or myalgia  NEURO: NEGATIVE for weakness, dizziness or paresthesias  HEME: NEGATIVE for bleeding problems    Patient Active Problem List    Diagnosis Date Noted     Recurrent major depressive disorder, in full remission (H) 03/02/2022     Priority: Medium     Chronic kidney disease, stage 2 (mild) 11/29/2021     Priority: Medium     Paraplegia (H) 04/30/2021     Priority: Medium     Chronic diastolic (congestive) heart failure (H) 03/19/2021     Priority: Medium     ANIYA -- noncompliant with CPAP 01/18/2020     Priority: Medium     Fusion of spine of cervical region 01/07/2020     Priority: Medium     NSTEMI (non-ST elevated myocardial infarction) (H) 12/30/2019     Priority: Medium     Osteoarthritis of cervical spine with myelopathy 03/04/2016     Priority: Medium     DISH (diffuse idiopathic skeletal hyperostosis) 03/04/2016     Priority: Medium     Elevated BMI 10/17/2015     Priority: Medium     Overview:   Body mass index is 47.93 kg/(m^^2) .       Wheelchair bound 08/15/2015     Priority: Medium     Neurogenic bladder, S/P Suprapubic Catheter  03/19/2015     Priority: Medium     Ventral hernia 03/19/2015     Priority: Medium     Recurrent UTI (urinary tract infection) 10/24/2014     Priority: Medium     Controlled substance agreement signed 10/24/2014 10/24/2014     Priority: Medium     She had a controlled substance agreement with [facility] until seen by pain clinic. Now followed by pain clinic; see chronic pain       Pain medication agreement 10/25/2013     Priority: Medium     Formatting of this note might be different from the original.  Violated contract September 2014- see encounter 10/1/14 Gabriela Suero M.D.   10/1/2014  9:30 AM       Other cystostomy status (H) 05/29/2012     Priority: Medium     Overview:   MRSA + urine 5/24/12       MRSA (methicillin resistant staph aureus) culture positive 05/29/2012     Priority: Medium     Formatting of this note might be different from  the original.  S/P catheter- + MRSA culture 12- follow up urine culture 10/12=e.coli, no staph mentioned.       Hyperlipidemia LDL goal <100 2011     Priority: Medium     Neuropathy 2011     Priority: Medium     Asthma 02/15/2011     Priority: Medium     Lumbar spinal stenosis 2011     Priority: Medium     Fibromyalgia, Chronic Pain -- On Methadone 2011     Priority: Medium     Osteoarthritis 2011     Priority: Medium     Hypertension goal BP (blood pressure) < 140/90 2011     Priority: Medium     Health Care Home 2011     Priority: Medium       Status:  No active care coordination   Care Coordinator:  Wendy Pearson [phone]  See Letters for H Care Plan  Date:  May 23, 2016           Hypothyroidism due to acquired atrophy of thyroid 2010     Priority: Medium     DM type 2, w Neuropathy -- Hgb A1C 7.1 on 1/7/20 10/31/2010     Priority: Medium     Chronic low back pain 2009     Priority: Medium     1981       Moderate major depression (H) 2009     Priority: Medium     Anxiety 2009     Priority: Medium      Past Medical History:   Diagnosis Date     Abdominal wall cellulitis 2020     Abdominopelvic abscess (H) 2020     Anxiety      Asthma      Depression      DM (diabetes mellitus) (H)     with peripheral neuropathy     Fracture of lower extremity 2013     Frequent UTI      History of blood transfusion      History of ulcer disease 2014    She notes from NSAIDs; nearly . Discussed a hospitalization at [facility] for this.     Hypertension      Hypothyroid      Iatrogenic Cushing's disease (H)     off prednisone now     Infection due to alpha-hemolytic Streptococcus 2020     Morbid obesity (H)      MRSA (methicillin resistant staph aureus) culture positive 2012    repeat cx 10/12/2012 no staph mentioned.     ANIYA -- noncompliant with CPAP      Osteoarthritis     multiple joings.     Other chronic  pain      Perforated duodenal ulcer -- S/P Surgery 1/1/2020 12/29/2019     SBO (small bowel obstruction) (H) 06/03/2020     Sepsis (WBC 20K, ) 03/16/2020     Sleep apnea     No longer uses cpap.     Past Surgical History:   Procedure Laterality Date     ABDOMEN SURGERY       APPLY WOUND VAC  12/30/2019    Procedure: Apply Wound Vac;  Surgeon: Ayan Lopez MD;  Location: [facility] OR     CLOSE SECONDARY WOUND ABDOMEN N/A 1/1/2020    Procedure: SECONDARY ABDOMINAL WOUND CLOSURE;  Surgeon: Ayan Lopez MD;  Location: [facility] OR     CYSTOSCOPY N/A 9/21/2018    Procedure: CYSTOSCOPY;  Cystoscopy and Botox Injection Into the Bladder and suprapubic tube exchange;  Surgeon: Yoandy Rios MD;  [facility]     CYSTOSCOPY N/A 3/15/2019    Procedure: Cystoscopy, suprapubic tube exchange;  Surgeon: Yoandy Rios MD;  [facility]     CYSTOSCOPY FLEXIBLE, CYSTOSTOMY, INSERT TUBE SUPRAPUBIC, COMBINED N/A 11/1/2019    Procedure: Cystoscopy, Bladder Botox Injection, Suprapubic Tube Change;  Surgeon: Yoandy Rios MD;  [facility]     CYSTOSCOPY, INTRAVESICAL INJECTION N/A 8/23/2017    Procedure: CYSTOSCOPY, INTRAVESICAL INJECTION;  Cystoscopy, Botox Injection Into The Bladder  Latex Allergy ;  Surgeon: Yoandy Rios MD;  [facility]     CYSTOSCOPY, INTRAVESICAL INJECTION N/A 3/8/2018    Procedure: CYSTOSCOPY, INTRAVESICAL INJECTION;  Cystoscopy, Botox Injection Into The Bladder and suprapubic catheter exchange;  Surgeon: Yoandy Rios MD;  [facility]     DISCECTOMY, FUSION CERVICAL ANTERIOR THREE+ LEVELS, COMBINED Left 5/3/2016    Procedure: COMBINED DISCECTOMY, FUSION CERVICAL ANTERIOR THREE+ LEVELS;  Surgeon: Jasvir Torres MD;  [facility]     ENTEROSCOPY SMALL BOWEL N/A 6/8/2020    Procedure: Enteroscopy small bowel;  Surgeon: Gabe Forte MD;  Location:  GI     GASTROSTOMY, INSERT TUBE, COMBINED N/A 1/1/2020    Procedure:  GASTRIC-JEJUNAL FEEDING TUBE INSERTION;  Surgeon: Ayan Lopez MD;  Location: [facility] OR     GENITOURINARY SURGERY      suprapubic catheter     HERNIA REPAIR  1957    double hernia     HYSTERECTOMY, PAP NO LONGER INDICATED      CELIA and large ovarian tumor removed     INJECT BOTOX N/A 9/21/2018    Procedure: INJECT BOTOX;;  Surgeon: Yoandy Rios MD;  [facility]     INJECT BOTOX N/A 3/15/2019    Procedure: Inject Botox into Bladder;  Surgeon: Yoandy Rios MD;  [facility]     IR GASTRO JEJUNOSTOMY TUBE PLACEMENT  6/11/2020     IR GASTROSTOMY TUBE CHANGE  6/30/2020     IR JEJUNOSTOMY TUBE CHANGE  2/4/2020     IR JEJUNOSTOMY TUBE CHANGE  2/19/2020     LAPAROSCOPY DIAGNOSTIC (GENERAL) N/A 12/30/2019    Procedure: Laparoscopy diagnostic (general);  Surgeon: Ayan Lopez MD;  [facility]: [facility]     LAPAROTOMY EXPLORATORY N/A 1/1/2020    Procedure: EXPLORATORY LAPAROTOMY;  Surgeon: Ayan Lopez MD;  [facility]     LAPAROTOMY EXPLORATORY N/A 12/30/2019    Procedure: LAPAROTOMY, REPAIR OF ULCER PERFORATION;  Surgeon: Ayan Lopez MD;  Location: [facility] OR     SURGICAL HISTORY OF -       left cataract surgery     SURGICAL HISTORY OF -   12/14    right cataract surgery and left laser revision     SURGICAL HISTORY OF -   March 2015    left carpal tunnel release     TONSILLECTOMY  1974     [facility] LAMINECTOMY,FACETECTOMY,LUMBAR  1982     Z TOTAL KNEE ARTHROPLASTY  1999    left     Current Outpatient Medications   Medication Sig Dispense Refill     albuterol (PROVENTIL) (2.5 MG/3ML) 0.083% neb solution USE 1 VIAL VIA NEBULIZATION EVERY 4 HOURS AS NEEDED 75 mL 1     aspirin 81 MG EC tablet Take 81 mg by mouth daily       bisacodyl (DULCOLAX) 10 MG suppository Place 10 mg rectally 2 times daily as needed for constipation       desonide (DESOWEN) 0.05 % external ointment Apply topically 2 times daily 60 g 1     DULoxetine (CYMBALTA) 60 MG capsule Take 120 mg by mouth every  morning       fluticasone-salmeterol (ADVAIR) 250-50 MCG/DOSE inhaler Inhale 1 puff into the lungs 2 times daily 60 each 11     fluticasone-vilanterol (BREO ELLIPTA) 100-25 MCG/INH inhaler Inhale 1 puff into the lungs daily 28 each 5     gabapentin (NEURONTIN) 800 MG tablet Take 1,600 mg by mouth 3 times daily 0800, 1200 and 1800       hydrOXYzine (ATARAX) 50 MG tablet 50 mg 2 times daily as needed        hypromellose (ARTIFICIAL TEARS) 0.5 % SOLN ophthalmic solution 1 drop every hour as needed       ipratropium - albuterol 0.5 mg/2.5 mg/3 mL (DUONEB) 0.5-2.5 (3) MG/3ML neb solution Take 1 vial by nebulization every 6 hours as needed for shortness of breath / dyspnea or wheezing       ketoconazole (NIZORAL) 2 % external shampoo        levothyroxine (SYNTHROID/LEVOTHROID) 88 MCG tablet Take 1 tablet (88 mcg) by mouth daily 90 tablet 3     lisinopril (ZESTRIL) 2.5 MG tablet Take 1 tablet (2.5 mg) by mouth daily 90 tablet 3     metFORMIN (GLUCOPHAGE-XR) 500 MG 24 hr tablet TAKE 1 TABLET(500 MG) BY MOUTH TWICE DAILY WITH MEALS 180 tablet 3     methadone (DOLOPHINE) 10 MG tablet Take 2 tablets (20 mg) by mouth 2 times daily 20 tablet 0     miconazole (MICATIN/MICRO GUARD) 2 % external powder Apply topically 2 times daily as needed for other (topical candidiasis)  0     multivitamin, therapeutic (THERA-VIT) TABS tablet Take 1 tablet by mouth daily       ondansetron (ZOFRAN-ODT) 4 MG ODT tab Take 1 tablet (4 mg) by mouth every 6 hours as needed for nausea or vomiting       oxybutynin ER (DITROPAN XL) 15 MG 24 hr tablet Take 1 tablet (15 mg) by mouth daily 90 tablet 4     pantoprazole (PROTONIX) 40 MG EC tablet Take 1 tablet (40 mg) by mouth daily 90 tablet 3     polyethylene glycol (MIRALAX/GLYCOLAX) packet Take 17 g by mouth 2 times daily as needed (constipation)  0     rosuvastatin (CRESTOR) 5 MG tablet TAKE 1 TABLET(5 MG) BY MOUTH AT BEDTIME 90 tablet 3     senna-docusate (SENOKOT-S/PERICOLACE) 8.6-50 MG tablet Take 2  "tablets by mouth 2 times daily as needed for constipation 100 tablet 0     tiZANidine (ZANAFLEX) 4 MG tablet Take 4 mg by mouth 3 times daily as needed for muscle spasms        traZODone (DESYREL) 100 MG tablet Take 150 mg by mouth At Bedtime       VENTOLIN  (90 Base) MCG/ACT inhaler INHALE 2 PUFFS INTO THE LUNGS EVERY 6 HOURS AS NEEDED FOR SHORTNESS OF BREATH OR DIFFICULT BREATHING 18 g 1       Allergies   Allergen Reactions     Povidone Iodine      \"mild skin irritation\" per patient report     Toradol [Ketorolac] Other (See Comments)     Pt gets nightmares     Tramadol Other (See Comments)        Social History     Tobacco Use     Smoking status: Never Smoker     Smokeless tobacco: Never Used   Substance Use Topics     Alcohol use: No     Family History   Problem Relation Age of Onset     Hypertension Mother      Heart Disease Mother         bypass     Depression Mother      Hypertension Father      Connective Tissue Disorder Father         ankylosing spondylitis     Cancer Father         colon; prostate     Other Cancer Father 89        bladder cancer     Depression Sister      Depression Son      History   Drug Use     Types: Marijuana     Comment: Medical canibis, methadone for chronic back pain         Objective     /60   Pulse 57   Temp 97.3  F (36.3  C) (Temporal)   Resp 16   Ht 1.651 m (5' 5\")   Wt 111.9 kg (246 lb 9.6 oz)   SpO2 96%   BMI 41.04 kg/m      Physical Exam       GENERAL APPEARANCE: in NAD, very pleasant, WC bound     EYES: no scleral icterus     HENT: OP clear mouth without ulcers or lesions     NECK: supple, no bruits     RESP: lungs clear to auscultation - no rales, rhonchi or wheezes     CV: regular rates and rhythm, normal S1 S2, no S3 or S4 and no murmur, click or rub     ABDOMEN:  soft, nontender, no HSM or masses and bowel sounds normal     MS: extremities normal- no gross deformities noted, no edema     PSYCH: mentation appears normal. and affect " normal/bright      Recent Labs   Lab Test 03/02/22  1532 01/11/22  1425 07/21/21  1544 03/19/21  1431   HGB 14.7 13.8  --  13.7     --   --  241   NA  --  135 138 135   POTASSIUM  --  4.2 4.6 4.4   CR  --  0.75 0.90 0.87   A1C 5.9*  --   --  5.8*        Diagnostics:  No labs were ordered during this visit.   EKG within the last 6 months stable    Revised Cardiac Risk Index (RCRI):  The patient has the following serious cardiovascular risks for perioperative complications:   - Coronary Artery Disease (MI, positive stress test, angina, Qs on EKG) = 1 point   - Congestive Heart Failure (pulmonary edema, PND, s3 regina, CXR with pulmonary congestion, basilar rales) = 1 point     RCRI Interpretation: 1 point: Class II (low risk - 0.9% complication rate)           Signed Electronically by: Tere Shen PA-C  Copy of this evaluation report is provided to requesting physician.

## 2022-06-30 NOTE — PATIENT INSTRUCTIONS
Please call your pain doctor to discuss obed-operative pain medication plan.    Stop all NSAIDs (motrin, ibuprofen, naproxen, aleve, advil, naprosyn, aspirin)  for at least 7 days prior to surgery.    Take lisinopril, cymbalta, gabapentin, levothyroxine and oxybutynin with a sip of water.  Use your Breo inhaler the am of surgery.  (Discuss am dose of methadone with your pain doctor.)     Skip vitamin and metformin the am of surgery.    Take the rest of your evening medications as usual the night before surgery.    Labs today

## 2022-07-01 LAB
ANION GAP SERPL CALCULATED.3IONS-SCNC: 6 MMOL/L (ref 3–14)
BUN SERPL-MCNC: 29 MG/DL (ref 7–30)
CALCIUM SERPL-MCNC: 9.9 MG/DL (ref 8.5–10.1)
CHLORIDE BLD-SCNC: 101 MMOL/L (ref 94–109)
CO2 SERPL-SCNC: 29 MMOL/L (ref 20–32)
CREAT SERPL-MCNC: 0.77 MG/DL (ref 0.52–1.04)
GFR SERPL CREATININE-BSD FRML MDRD: 85 ML/MIN/1.73M2
GLUCOSE BLD-MCNC: 96 MG/DL (ref 70–99)
POTASSIUM BLD-SCNC: 4.6 MMOL/L (ref 3.4–5.3)
SODIUM SERPL-SCNC: 136 MMOL/L (ref 133–144)

## 2022-07-01 RX ORDER — PANTOPRAZOLE SODIUM 40 MG/1
40 TABLET, DELAYED RELEASE ORAL DAILY
Qty: 90 TABLET | Refills: 3 | Status: SHIPPED | OUTPATIENT
Start: 2022-07-01 | End: 2022-10-10

## 2022-07-01 NOTE — RESULT ENCOUNTER NOTE
Dear Bhavani,     Here are your recent complete blood count results which look good.  You should resume your protonix due to your history of peptic ulcer.  I sent a prescription to your pharmacy.  You should also have routine follow-up with cardiology due to your cardiac history.  This referral was placed today but appointment does not need to be done before your procedure.  I tried to call you to discuss but ended up leaving a voicemail.  Please let me know if you have any questions.    Thank you,  Tere Shen PA-C

## 2022-07-03 NOTE — RESULT ENCOUNTER NOTE
Dear Bhavani,     Here are your recent basic metabolic panel (kidney function, electrolytes) results which are within the expected range. Please continue with your current plan of care and let us know if you have any questions or concerns.    Regards,  Tere Shen PA-C

## 2022-07-08 ENCOUNTER — TELEPHONE (OUTPATIENT)
Dept: FAMILY MEDICINE | Facility: CLINIC | Age: 67
End: 2022-07-08

## 2022-07-08 NOTE — TELEPHONE ENCOUNTER
The Home Care/Assisted Living/Nursing Facility is calling regarding an established patient.  Has the patient seen Home Care in the past or is currently residing in Assisted Living or Nursing Facility? Yes.     Wojciech calling from [facility] requesting the following orders that are within the Home Care, Assisted Living or Nursing Home Eval and Treatment standing order and can be signed as standing order signature required by RN.    Preferred Call Back Number: [phone]    Home Care Visits Continuation    Any additional Orders:  Are there any orders requested, not stated above, that are outside of the standing order and must be routed to a licensed practitioner for approval?    No  Verbal orders provided  For SNV 1 x a week then 1 time a week for 4 weeks.for cath changes  3 prn's    Writer has verified Requestor will send fax to have orders signed.    Donovan Sifuentes RN on 7/8/2022 at 3:25 PM

## 2022-07-11 ENCOUNTER — MEDICAL CORRESPONDENCE (OUTPATIENT)
Dept: HEALTH INFORMATION MANAGEMENT | Facility: CLINIC | Age: 67
End: 2022-07-11

## 2022-07-14 ENCOUNTER — LAB REQUISITION (OUTPATIENT)
Dept: LAB | Facility: CLINIC | Age: 67
End: 2022-07-14
Payer: MEDICARE

## 2022-07-14 DIAGNOSIS — N31.9 NEUROMUSCULAR DYSFUNCTION OF BLADDER, UNSPECIFIED: ICD-10-CM

## 2022-07-14 DIAGNOSIS — Z87.440 PERSONAL HISTORY OF URINARY (TRACT) INFECTIONS: ICD-10-CM

## 2022-07-14 LAB
ALBUMIN UR-MCNC: 50 MG/DL
APPEARANCE UR: CLEAR
BACTERIA #/AREA URNS HPF: ABNORMAL /HPF
BILIRUB UR QL STRIP: NEGATIVE
COLOR UR AUTO: YELLOW
GLUCOSE UR STRIP-MCNC: NEGATIVE MG/DL
HGB UR QL STRIP: ABNORMAL
KETONES UR STRIP-MCNC: NEGATIVE MG/DL
LEUKOCYTE ESTERASE UR QL STRIP: ABNORMAL
MUCOUS THREADS #/AREA URNS LPF: PRESENT /LPF
NITRATE UR QL: NEGATIVE
PH UR STRIP: 8 [PH] (ref 5–7)
RBC URINE: 94 /HPF
SP GR UR STRIP: 1.01 (ref 1–1.03)
SQUAMOUS EPITHELIAL: <1 /HPF
UROBILINOGEN UR STRIP-MCNC: NORMAL MG/DL
WBC URINE: 30 /HPF

## 2022-07-14 PROCEDURE — 81001 URINALYSIS AUTO W/SCOPE: CPT | Mod: ORL | Performed by: UROLOGY

## 2022-07-14 PROCEDURE — 87086 URINE CULTURE/COLONY COUNT: CPT | Mod: ORL | Performed by: UROLOGY

## 2022-07-15 ENCOUNTER — TELEPHONE (OUTPATIENT)
Dept: FAMILY MEDICINE | Facility: CLINIC | Age: 67
End: 2022-07-15

## 2022-07-15 ENCOUNTER — PRE VISIT (OUTPATIENT)
Dept: UROLOGY | Facility: CLINIC | Age: 67
End: 2022-07-15

## 2022-07-15 DIAGNOSIS — T83.511D URINARY TRACT INFECTION ASSOCIATED WITH INDWELLING URETHRAL CATHETER, SUBSEQUENT ENCOUNTER: Primary | ICD-10-CM

## 2022-07-15 DIAGNOSIS — N39.0 URINARY TRACT INFECTION ASSOCIATED WITH INDWELLING URETHRAL CATHETER, SUBSEQUENT ENCOUNTER: Primary | ICD-10-CM

## 2022-07-15 NOTE — TELEPHONE ENCOUNTER
RN from [facility] calls:    S: UTI  B: Sent urinalysis/uc to [facility] yesterday. Results sent to urologist instead of Primary Care Provider.    A: positive urinalysis. Urine culture pending.   Had macrobid on 6/29/2022. Patient still symptomatic     R: Requesting Cipro for treatment. Patient is wheelchair bound.    [id number] [place], [place] - [address] [facility] [address]     Lola Wynn RN

## 2022-07-16 LAB
BACTERIA UR CULT: ABNORMAL

## 2022-07-18 RX ORDER — CIPROFLOXACIN 500 MG/1
500 TABLET, FILM COATED ORAL 2 TIMES DAILY
Qty: 14 TABLET | Refills: 0 | Status: SHIPPED | OUTPATIENT
Start: 2022-07-18 | End: 2022-07-25

## 2022-07-18 NOTE — TELEPHONE ENCOUNTER
Called Rika, [facility] care RN, and left voicemail to call back and ask to speak to any triage nurse.    Natalya Pitt RN

## 2022-07-19 ENCOUNTER — TELEPHONE (OUTPATIENT)
Dept: FAMILY MEDICINE | Facility: CLINIC | Age: 67
End: 2022-07-19

## 2022-07-19 NOTE — TELEPHONE ENCOUNTER
Patricia RIBEIRO from [facility] called requesting the med clearance form to faxed to them or the notes from the pre op visit. Per Patricia- they never received anything from the doctors office.     Fax #- [phone]   Attn: Neva

## 2022-07-19 NOTE — TELEPHONE ENCOUNTER
2nd attempt - Called Riak [facility] care RN, and left voicemail to call back and ask to speak to any triage nurse.

## 2022-07-19 NOTE — TELEPHONE ENCOUNTER
Writer printed and faxed recent preop to following fax: [phone] with Attn:Edna Martinez RN  [facility]

## 2022-07-19 NOTE — TELEPHONE ENCOUNTER
Rika, [facility] RN, returns call. Relayed provider message below. Rika was given an opportunity to ask questions, verbalized understanding of plan, and is agreeable.    Natalya Pitt, RN

## 2022-07-19 NOTE — TELEPHONE ENCOUNTER
Called pt and relayed provider message below. Patient was given an opportunity to ask questions, verbalized understanding of plan, and is agreeable.    Natalya Pitt RN

## 2022-07-25 ENCOUNTER — MEDICAL CORRESPONDENCE (OUTPATIENT)
Dept: FAMILY MEDICINE | Facility: CLINIC | Age: 67
End: 2022-07-25

## 2022-07-25 ENCOUNTER — TELEPHONE (OUTPATIENT)
Dept: FAMILY MEDICINE | Facility: CLINIC | Age: 67
End: 2022-07-25

## 2022-07-25 DIAGNOSIS — Z53.9 DIAGNOSIS NOT YET DEFINED: Primary | ICD-10-CM

## 2022-07-25 PROCEDURE — G0179 MD RECERTIFICATION HHA PT: HCPCS | Performed by: FAMILY MEDICINE

## 2022-07-25 NOTE — TELEPHONE ENCOUNTER
Elizabeth faxed and placed in abstracting and in folder at [facility].    Mattie/MA

## 2022-07-25 NOTE — TELEPHONE ENCOUNTER
Orders placed in provider in basket- sign and fax to [phone]    Rosalva HAN  [facility]   [facility]

## 2022-07-26 ENCOUNTER — ANCILLARY PROCEDURE (OUTPATIENT)
Dept: ULTRASOUND IMAGING | Facility: CLINIC | Age: 67
End: 2022-07-26
Attending: UROLOGY
Payer: MEDICARE

## 2022-07-26 DIAGNOSIS — N31.9 NEUROGENIC BLADDER: ICD-10-CM

## 2022-07-26 PROCEDURE — 76770 US EXAM ABDO BACK WALL COMP: CPT

## 2022-07-26 NOTE — PROGRESS NOTES
Scanned patient in wheelchair so she did not have to transfer. Wheelchair reclined and could see everything optimally.

## 2022-08-07 ENCOUNTER — OFFICE VISIT (OUTPATIENT)
Dept: URGENT CARE | Facility: URGENT CARE | Age: 67
End: 2022-08-07
Payer: MEDICARE

## 2022-08-07 VITALS
WEIGHT: 246 LBS | BODY MASS INDEX: 40.94 KG/M2 | DIASTOLIC BLOOD PRESSURE: 80 MMHG | RESPIRATION RATE: 18 BRPM | HEART RATE: 79 BPM | SYSTOLIC BLOOD PRESSURE: 123 MMHG | TEMPERATURE: 97.9 F | OXYGEN SATURATION: 98 %

## 2022-08-07 DIAGNOSIS — R30.0 DYSURIA: ICD-10-CM

## 2022-08-07 DIAGNOSIS — N39.0 URINARY TRACT INFECTION WITH HEMATURIA, SITE UNSPECIFIED: Primary | ICD-10-CM

## 2022-08-07 DIAGNOSIS — R31.9 URINARY TRACT INFECTION WITH HEMATURIA, SITE UNSPECIFIED: Primary | ICD-10-CM

## 2022-08-07 LAB
ALBUMIN UR-MCNC: 30 MG/DL
APPEARANCE UR: CLEAR
BACTERIA #/AREA URNS HPF: ABNORMAL /HPF
BILIRUB UR QL STRIP: NEGATIVE
COLOR UR AUTO: ABNORMAL
GLUCOSE UR STRIP-MCNC: 100 MG/DL
HGB UR QL STRIP: NEGATIVE
KETONES UR STRIP-MCNC: NEGATIVE MG/DL
LEUKOCYTE ESTERASE UR QL STRIP: ABNORMAL
NITRATE UR QL: POSITIVE
PH UR STRIP: 7 [PH] (ref 5–7)
RBC #/AREA URNS AUTO: ABNORMAL /HPF
SP GR UR STRIP: 1.01 (ref 1–1.03)
UROBILINOGEN UR STRIP-ACNC: 1 E.U./DL
WBC #/AREA URNS AUTO: ABNORMAL /HPF

## 2022-08-07 PROCEDURE — 87086 URINE CULTURE/COLONY COUNT: CPT | Performed by: INTERNAL MEDICINE

## 2022-08-07 PROCEDURE — 81001 URINALYSIS AUTO W/SCOPE: CPT

## 2022-08-07 PROCEDURE — 99213 OFFICE O/P EST LOW 20 MIN: CPT | Performed by: INTERNAL MEDICINE

## 2022-08-07 PROCEDURE — 87088 URINE BACTERIA CULTURE: CPT | Performed by: INTERNAL MEDICINE

## 2022-08-07 RX ORDER — SULFAMETHOXAZOLE/TRIMETHOPRIM 800-160 MG
1 TABLET ORAL 2 TIMES DAILY
Qty: 14 TABLET | Refills: 0 | Status: SHIPPED | OUTPATIENT
Start: 2022-08-07 | End: 2022-08-14

## 2022-08-07 NOTE — PROGRESS NOTES
"  Assessment & Plan     Urinary tract infection with hematuria, site unspecified  She'll be discussing prophylaxis options with her urologist.  - sulfamethoxazole-trimethoprim (BACTRIM DS) 800-160 MG tablet; Take 1 tablet by mouth 2 times daily for 7 days    Dysuria  - UA macro with reflex to Microscopic and Culture - Clinc Collect  - Urine Microscopic Exam  - Urine Culture    Maverick Zamora MD  [facility] CARE SHERLY Beckham is a 66 year old, presenting for the following health issues:  UTI (PT states she is having severe bladder spasms, chills, and incontinence )      HPI   Concern about UTI.  She has a suprapubic catheter.  Now noting more blood and sediment in the urine.  Hasn't been feverish.  Having some nausea and feeling warm and clammy and chills.  More bladder spasms that feel like \"labor pains.\"  Denies back pain.  Using some Azo.   She is prone to frequent UTIs.    Review of Systems   Constitutional, HEENT, cardiovascular, pulmonary, gi and gu systems are negative, except as otherwise noted.      Objective    /80   Pulse 79   Temp 97.9  F (36.6  C) (Tympanic)   Resp 18   Wt 111.6 kg (246 lb)   SpO2 98%   BMI 40.94 kg/m    Body mass index is 40.94 kg/m .  Physical Exam   GENERAL APPEARANCE: healthy, alert and no distress    Results for orders placed or performed in visit on 08/07/22 (from the past 24 hour(s))   UA macro with reflex to Microscopic and Culture - Clinc Collect    Specimen: Urine, Catheter   Result Value Ref Range    Color Urine Arianna (A) Colorless, Straw, Light Yellow, Yellow    Appearance Urine Clear Clear    Glucose Urine 100  (A) Negative mg/dL    Bilirubin Urine Negative Negative    Ketones Urine Negative Negative mg/dL    Specific Gravity Urine 1.010 1.003 - 1.035    Blood Urine Negative Negative    pH Urine 7.0 5.0 - 7.0    Protein Albumin Urine 30  (A) Negative mg/dL    Urobilinogen Urine 1.0 0.2, 1.0 E.U./dL    Nitrite Urine Positive (A) " Negative    Leukocyte Esterase Urine Moderate (A) Negative   Urine Microscopic Exam   Result Value Ref Range    Bacteria Urine Moderate (A) None Seen /HPF    RBC Urine 0-2 0-2 /HPF /HPF    WBC Urine 5-10 (A) 0-5 /HPF /HPF     *Note: Due to a large number of results and/or encounters for the requested time period, some results have not been displayed. A complete set of results can be found in Results Review.                 .  ..

## 2022-08-10 LAB
BACTERIA UR CULT: ABNORMAL

## 2022-08-12 ENCOUNTER — VIRTUAL VISIT (OUTPATIENT)
Dept: UROLOGY | Facility: CLINIC | Age: 67
End: 2022-08-12
Payer: MEDICARE

## 2022-08-12 DIAGNOSIS — N28.1 RENAL CYST: ICD-10-CM

## 2022-08-12 DIAGNOSIS — N39.0 RECURRENT UTI: ICD-10-CM

## 2022-08-12 DIAGNOSIS — N31.9 NEUROGENIC BLADDER: Primary | ICD-10-CM

## 2022-08-12 PROCEDURE — 99214 OFFICE O/P EST MOD 30 MIN: CPT | Mod: 95 | Performed by: PHYSICIAN ASSISTANT

## 2022-08-12 RX ORDER — OXYBUTYNIN CHLORIDE 15 MG/1
15 TABLET, EXTENDED RELEASE ORAL DAILY
Qty: 30 TABLET | Refills: 11 | Status: SHIPPED | OUTPATIENT
Start: 2022-08-12 | End: 2023-01-01

## 2022-08-12 NOTE — PROGRESS NOTES
"  Bhavani White  who is being evaluated via a billable video visit.      How would you like to obtain your AVS? [employer]  If the video visit is dropped, the invitation should be resent by: Text to cell phone: [phone]  Will anyone else be joining your video visit? No      Urology Virtual Visit - Follow Up    Reason for Visit: annual follow up; discuss recurrent UTIs    HPI:  Bhavani White is a 66 year old female with a history of neurogenic bladder secondary to spinal stenosis. She manages her neurogenic urinary retention with a suprapubic tube (since 2009) but had refractory urge incontinence and is now s/p multiple bladder Botox injections. Her last injection was Nov 2019. At her last virtual visit with Dr. Rios on 7/12/21, she was doing well with no recurrence of her urgency or urge incontinence. She was taking oxybutynin ER 15 mg daily and had not had any recent UTIs.    TODAY   8/12/22:  Seen today via virtual visit for annual follow up and to discuss recurrent UTIs. She estimates that she has had 4-5 infections in the last year. Symptoms include \"debilitating\" bladder spasms associated with urgency incontinence as well as burning when she passes urine through her urethra. She asks about a rotating regimen of antibiotic prophylaxis vs self-start antibiotics with Cipro and Bactrim. Of note, she stopped oxybutynin a few weeks ago due to a significant price increase. Cannot tell if symptoms worse since stopping the medication.    Exam:  GENERAL: Healthy, alert and no distress  EYES: Eyes grossly normal to inspection.  No discharge or erythema, or obvious scleral/conjunctival abnormalities.  RESP: No audible wheeze, cough, or visible cyanosis.  No visible retractions or increased work of breathing.    SKIN: Visible skin clear. No significant rash, abnormal pigmentation or lesions.  NEURO: Cranial nerves grossly intact.  Mentation and speech appropriate for age.  PSYCH: Mentation appears normal, affect " normal/bright, judgement and insight intact, normal speech and appearance well-groomed.    Review of Imaging:  EXAM: US RENAL COMPLETE  LOCATION: [facility]  DATE/TIME: 7/26/2022 2:26 PM    FINDINGS:     RIGHT KIDNEY: Measures 10.2 x 6.5 x 5.6 cm. It demonstrated normal cortical echogenicity and thickness of 1.4 cm. No hydronephrosis or shadowing calculi. There is 1.1 x 0.9 x 0.9 cm anechoic cyst at the interpolar region. There is 1.3 x 0.9 x 0.9 cm   echogenic focus at the interpolar region of the right kidney, could represent renal angiomyolipoma.      LEFT KIDNEY: Measures 9.6 x 4.5 x 5.3 cm. It demonstrates normal cortical echogenicity and thickness of 1.8 cm. No hydronephrosis or shadowing calculi. Two renal cysts measure 2.8 x 2.9 x 2.3 cm at the upper pole and 3.3 x 2.3 x 2.6 cm the lower pole.      BLADDER: Decompressed with Lundberg catheter.                                                                      IMPRESSION:  1.  No hydronephrosis or shadowing calculi in either kidney. Bilateral renal cysts.   2.  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  3.  The urinary bladder is decompressed with Lundberg catheter.      Review of Labs:  Lab Results   Component Value Date    CULTURE  08/07/2022     50,000-100,000 CFU/mL Non lactose fermenting gram negative bacilli    CULTURE  08/07/2022     10,000-50,000 CFU/mL Non lactose fermenting gram negative bacilli    CULTURE >100,000 CFU/mL Gram positive cocci 08/07/2022    CULTURE 10,000-50,000 CFU/mL Gram positive cocci 08/07/2022    CULTURE <10,000 CFU/mL Gram negative bacilli 07/14/2022    CULTURE <10,000 CFU/mL Gram negative bacilli 07/14/2022    CULTURE 10,000-50,000 CFU/mL Gram negative bacilli 07/14/2022    CULTURE 50,000-100,000 CFU/mL Gram positive cocci 07/14/2022    CULTURE >100,000  CFU/mL Mixture of urogenital bruno 04/29/2022    CULTURE  04/01/2022     50,000-100,000 CFU/mL Lactose fermenting gram negative bacilli    CULTURE 10,000-50,000 CFU/mL Gram positive cocci 04/01/2022    CULTURE 10,000-50,000 CFU/mL Gram negative bacilli 04/01/2022    CULTURE 10,000-50,000 CFU/mL Gram positive cocci 04/01/2022    CULTURE 10,000-50,000 CFU/mL Gram positive cocci 04/01/2022    CULTURE  01/21/2022     >100,000 CFU/mL Lactose fermenting gram negative bacilli    CULTURE 10,000-50,000 CFU/mL Gram positive cocci 01/21/2022    CULTURE  01/21/2022     <10,000 CFU/mL Lactose fermenting gram negative bacilli    CULTURE 10,000-50,000 CFU/mL Gram positive cocci 01/21/2022    CULTURE 10,000-50,000 CFU/mL Gram positive cocci 01/21/2022    CULTURE 10,000-50,000 CFU/mL Gram negative bacilli 08/10/2021    CULTURE 50,000-100,000 CFU/mL Gram negative bacilli 08/10/2021    CULTURE <10,000 CFU/mL Gram negative bacilli 08/10/2021    CULTURE 10,000-50,000 CFU/mL Gram positive cocci 08/10/2021       Creatinine   Date Value Ref Range Status   06/30/2022 0.77 0.52 - 1.04 mg/dL Final   03/19/2021 0.87 0.52 - 1.04 mg/dL Final         Assessment & Plan   1. Neurogenic bladder with urinary retention secondary to spinal stenosis, managed with SP tube since 2009. Previously received bladder Botox injections for refractory urgency and urge incontinence, last injection Nov 2019. Now having increasing / recurrent UTIs associated with severe bladder spasms and urgency incontinence. We discussed that if her bladder is small and contracted with frequent spasms, this can result in normally harmless colonizing bacteria to invade through the bladder mucosa, thereby causing symptomatic cystitis and more severe symptoms. In this case, bladder Botox can help to reduce bladder spasticity and compliance, thereby preventing UTIs, painful spasms, and urgency incontinence. She is amenable to proceed with cysto/bladdder Botox injection in the OR next  available.   -Message sent to Dr. Rios re: scheduling.   -Will also send new Rx for oxybutynin ER 15 mg daily to resume in the meantime.  -Continue regular SP tube changes.    2. Incidental finding of a 1.3 echogenic non-shadowing focus in the interpolar region of the right kidney, possibly an AML. As this is new compared to prior imaging, will plan to further evaluate with CT A/P w/ contrast next available.     Follow up with me in 1 year with renal ultrasound prior, sooner if UTIs or bladder spasms / incontinence persists despite repeat Botox injection.    Amanda Dinero PA-C  Department of Urology        Video-Visit Details    Type of service:  Video Visit    Video Start Time: 4:28 PM    Video End Time: 4:53 PM    Originating Location (pt. Location): Home    Distant Location (provider location):  [facility]     Platform used for Video Visit: [employer]      45 minutes spent on the date of the encounter doing chart review, review of test results, interpretation of tests, patient visit and documentation

## 2022-08-12 NOTE — PATIENT INSTRUCTIONS
UROLOGY CLINIC VISIT PATIENT INSTRUCTIONS    Follow up for a CT scan to further evaluate the new spot on the right kidney.     A new prescription for oxybutynin XL 15 mg daily was sent to your pharmacy. Let us know if still cost prohibitive.    Will talk to Dr. Rios about scheduling cystoscopy and bladder Botox injection in the OR next available. Someone from surgery scheduling will contact you to set up a date.    If you have any issues, questions or concerns in the meantime, do not hesitate to contact us at [phone] or via [employer].     It was a pleasure meeting with you today.  Thank you for allowing me and my team the privilege of caring for you today.  YOU are the reason we are here, and I truly hope we provided you with the excellent service you deserve.  Please let us know if there is anything else we can do for you so that we can be sure you are leaving completely satisfied with your care experience.

## 2022-08-13 ENCOUNTER — TELEPHONE (OUTPATIENT)
Dept: URGENT CARE | Facility: URGENT CARE | Age: 67
End: 2022-08-13

## 2022-08-13 DIAGNOSIS — N39.0 URINARY TRACT INFECTION WITH HEMATURIA, SITE UNSPECIFIED: Primary | ICD-10-CM

## 2022-08-13 DIAGNOSIS — R31.9 URINARY TRACT INFECTION WITH HEMATURIA, SITE UNSPECIFIED: Primary | ICD-10-CM

## 2022-08-13 RX ORDER — CEFDINIR 300 MG/1
300 CAPSULE ORAL 2 TIMES DAILY
Qty: 10 CAPSULE | Refills: 0 | Status: SHIPPED | OUTPATIENT
Start: 2022-08-13 | End: 2022-08-18

## 2022-08-13 NOTE — TELEPHONE ENCOUNTER
----- Message from Gloria Shore MA sent at 8/13/2022 11:42 AM CDT -----  Urine Culture is back.    Patient reports her UTI sx's has not resolved. Pt preferred pharmacy is [facility] on [address] in [place].     Please Advise.     HAVEN Shore, Medical Assistant

## 2022-08-13 NOTE — TELEPHONE ENCOUNTER
Culture reveals likely contamination, however there is a substantial amount of gram + cocci in the urine.  Gram negative cocci to a lesser extent.  I will send over a 5 day prescription of Omnicef which will cover both.    Please have her follow up (via phone is fine) her urologist should her symptoms persist.     Thank you.   Yani BALDERAS, PA-C

## 2022-08-16 DIAGNOSIS — N31.9 NEUROGENIC BLADDER: Primary | ICD-10-CM

## 2022-08-17 ENCOUNTER — ANCILLARY PROCEDURE (OUTPATIENT)
Dept: CT IMAGING | Facility: CLINIC | Age: 67
End: 2022-08-17
Attending: PHYSICIAN ASSISTANT
Payer: MEDICARE

## 2022-08-17 DIAGNOSIS — N28.1 RENAL CYST: ICD-10-CM

## 2022-08-17 LAB
CREAT BLD-MCNC: 1 MG/DL (ref 0.5–1)
GFR SERPL CREATININE-BSD FRML MDRD: >60 ML/MIN/1.73M2

## 2022-08-17 PROCEDURE — G1010 CDSM STANSON: HCPCS

## 2022-08-17 PROCEDURE — 82565 ASSAY OF CREATININE: CPT

## 2022-08-17 PROCEDURE — 255N000002 HC RX 255 OP 636: Performed by: PHYSICIAN ASSISTANT

## 2022-08-17 RX ADMIN — IOHEXOL 100 ML: 350 INJECTION, SOLUTION INTRAVENOUS at 13:44

## 2022-08-18 DIAGNOSIS — N28.9 RENAL LESION: Primary | ICD-10-CM

## 2022-08-18 NOTE — PROGRESS NOTES
Indeterminate right renal lesion on CT renal mass protocol with possible suggestion for enhancement with contrast. Radiologist recommends renal MRI to further evaluate. Order placed and will ask clinic coordinators to assist with scheduling.    Amanda Dinero PA-C  Department of Urology

## 2022-08-19 DIAGNOSIS — N31.9 NEUROGENIC BLADDER: Primary | ICD-10-CM

## 2022-08-19 NOTE — PROGRESS NOTES
Per Amanda Dinero PA-C and the 8/12/22 visit note, patient to follow up in 1 year with renal ultrasound prior. Future renal ultrasound ordered and sent for Amanda Dinero PA-C to review and sign.    Lily Sawant RN, BSN

## 2022-08-22 NOTE — TELEPHONE ENCOUNTER
Called patient to inform her of appt tomorrow for replacement.    Deb Glover RN-BSN     No heavy lifting/straining

## 2022-08-29 ENCOUNTER — ANCILLARY PROCEDURE (OUTPATIENT)
Dept: MRI IMAGING | Facility: CLINIC | Age: 67
End: 2022-08-29
Attending: PHYSICIAN ASSISTANT
Payer: MEDICARE

## 2022-08-29 ENCOUNTER — TELEPHONE (OUTPATIENT)
Dept: UROLOGY | Facility: CLINIC | Age: 67
End: 2022-08-29

## 2022-08-29 DIAGNOSIS — N28.9 RENAL LESION: ICD-10-CM

## 2022-08-29 PROCEDURE — G1010 CDSM STANSON: HCPCS

## 2022-08-29 PROCEDURE — 255N000002 HC RX 255 OP 636: Performed by: PHYSICIAN ASSISTANT

## 2022-08-29 PROCEDURE — A9585 GADOBUTROL INJECTION: HCPCS | Performed by: PHYSICIAN ASSISTANT

## 2022-08-29 PROCEDURE — 74183 MRI ABD W/O CNTR FLWD CNTR: CPT | Mod: MG

## 2022-08-29 RX ORDER — GADOBUTROL 604.72 MG/ML
10 INJECTION INTRAVENOUS ONCE
Status: COMPLETED | OUTPATIENT
Start: 2022-08-29 | End: 2022-08-29

## 2022-08-29 RX ADMIN — GADOBUTROL 10 ML: 604.72 INJECTION INTRAVENOUS at 13:48

## 2022-08-29 NOTE — TELEPHONE ENCOUNTER
Called patient to schedule surgery with Dr. Rios there was no answer, left voice message for patient to callback direct line to schedule.     Patricia Belcher on 8/29/2022 at 11:33 AM

## 2022-08-30 ENCOUNTER — TELEPHONE (OUTPATIENT)
Dept: UROLOGY | Facility: CLINIC | Age: 67
End: 2022-08-30

## 2022-08-30 DIAGNOSIS — N28.9 RENAL LESION: Primary | ICD-10-CM

## 2022-09-02 ENCOUNTER — MYC MEDICAL ADVICE (OUTPATIENT)
Dept: UROLOGY | Facility: CLINIC | Age: 67
End: 2022-09-02

## 2022-09-06 ENCOUNTER — TELEPHONE (OUTPATIENT)
Dept: UROLOGY | Facility: CLINIC | Age: 67
End: 2022-09-06

## 2022-09-06 NOTE — TELEPHONE ENCOUNTER
Patient is scheduled for surgery with Dr. Rios     Spoke with: Patient via fruux     Date of Surgery: Friday September 23, 2022     Location: [facility] OR      Informed patient they will need an adult : Yes     Pre-op: Yes      H&P: Patient to schedule with PCP      Pre-procedure COVID-19 Test: Home test     Post-op:     Additional imaging/appointments:     Additional comments:      Surgery packet: Sent via mail 9/6/22     Patient is aware that surgery time is tentative to change and to expect a call 3-1 business days from Pre Admission Nursing for instructions and arrival time

## 2022-09-09 ENCOUNTER — MEDICAL CORRESPONDENCE (OUTPATIENT)
Dept: HEALTH INFORMATION MANAGEMENT | Facility: CLINIC | Age: 67
End: 2022-09-09

## 2022-09-10 DIAGNOSIS — N18.2 CHRONIC KIDNEY DISEASE, STAGE 2 (MILD): ICD-10-CM

## 2022-09-12 ENCOUNTER — MEDICAL CORRESPONDENCE (OUTPATIENT)
Dept: HEALTH INFORMATION MANAGEMENT | Facility: CLINIC | Age: 67
End: 2022-09-12

## 2022-09-12 ENCOUNTER — TELEPHONE (OUTPATIENT)
Dept: FAMILY MEDICINE | Facility: CLINIC | Age: 67
End: 2022-09-12

## 2022-09-12 RX ORDER — LISINOPRIL 2.5 MG/1
TABLET ORAL
Qty: 90 TABLET | Refills: 3 | OUTPATIENT
Start: 2022-09-12

## 2022-09-12 NOTE — TELEPHONE ENCOUNTER
Should have refills on file at [facility] in [place]    Alberta ZAZUETA RN, BSN, PHN  [facility]

## 2022-09-12 NOTE — TELEPHONE ENCOUNTER
Wojciech calling from [facility].      Skilled Nurse  1 x every 4 weeks  2 as needed    Verbal order given.  Will fax for MD signature.  Lakisha Marie RN

## 2022-09-19 ENCOUNTER — TELEPHONE (OUTPATIENT)
Dept: UROLOGY | Facility: CLINIC | Age: 67
End: 2022-09-19

## 2022-09-19 DIAGNOSIS — N39.0 RECURRENT UTI: Primary | ICD-10-CM

## 2022-09-19 RX ORDER — NITROFURANTOIN 25; 75 MG/1; MG/1
100 CAPSULE ORAL 2 TIMES DAILY
Qty: 6 CAPSULE | Refills: 0 | Status: SHIPPED | OUTPATIENT
Start: 2022-09-20 | End: 2022-09-19

## 2022-09-19 NOTE — TELEPHONE ENCOUNTER
----- Message from Hernán Tellez MD sent at 9/18/2022  4:48 PM CDT -----  Rashad Cooney,     Can we see if patient can drop off UA before her surgery on Friday? We should also pre-treat her, can we have her start NF 100mg BID for 3 days Tu-Th this week?     Thank you!  Elaine

## 2022-09-19 NOTE — TELEPHONE ENCOUNTER
Pt returned my call. She had oral surgery and will need to cancel her procedure. Message sent to the team.    Ivet Snow CMA  09/19/22  12:27 PM

## 2022-09-20 ENCOUNTER — TELEPHONE (OUTPATIENT)
Dept: FAMILY MEDICINE | Facility: CLINIC | Age: 67
End: 2022-09-20

## 2022-09-20 NOTE — TELEPHONE ENCOUNTER
Fax received from [facility] for Home Health Certification and Plan of Care, this will be placed in [facility] in box for completion .    Treva Xiong

## 2022-09-21 DIAGNOSIS — Z53.9 DIAGNOSIS NOT YET DEFINED: Primary | ICD-10-CM

## 2022-09-21 PROCEDURE — G0179 MD RECERTIFICATION HHA PT: HCPCS | Performed by: FAMILY MEDICINE

## 2022-09-28 ENCOUNTER — OFFICE VISIT (OUTPATIENT)
Dept: URGENT CARE | Facility: URGENT CARE | Age: 67
End: 2022-09-28
Payer: MEDICARE

## 2022-09-28 VITALS
SYSTOLIC BLOOD PRESSURE: 120 MMHG | DIASTOLIC BLOOD PRESSURE: 75 MMHG | HEART RATE: 67 BPM | RESPIRATION RATE: 20 BRPM | TEMPERATURE: 99.1 F | OXYGEN SATURATION: 97 %

## 2022-09-28 DIAGNOSIS — R30.0 DYSURIA: Primary | ICD-10-CM

## 2022-09-28 LAB
BACTERIA #/AREA URNS HPF: ABNORMAL /HPF
MUCOUS THREADS #/AREA URNS LPF: PRESENT /LPF
RBC #/AREA URNS AUTO: ABNORMAL /HPF
SQUAMOUS #/AREA URNS AUTO: ABNORMAL /LPF
WBC #/AREA URNS AUTO: ABNORMAL /HPF

## 2022-09-28 PROCEDURE — 81015 MICROSCOPIC EXAM OF URINE: CPT | Performed by: PHYSICIAN ASSISTANT

## 2022-09-28 PROCEDURE — 99213 OFFICE O/P EST LOW 20 MIN: CPT | Performed by: PHYSICIAN ASSISTANT

## 2022-09-28 RX ORDER — SULFAMETHOXAZOLE/TRIMETHOPRIM 800-160 MG
1 TABLET ORAL 2 TIMES DAILY
Qty: 14 TABLET | Refills: 0 | Status: SHIPPED | OUTPATIENT
Start: 2022-09-28 | End: 2022-10-05

## 2022-09-30 NOTE — PROGRESS NOTES
SUBJECTIVE:   Bhavani White is a 66 year old female who  presents today for a possible UTI. Symptoms of leaking with dysuria.  Typical symptoms for her recutrrent UTIs.  No sense of illness, fever, back pain, nuasea, vomiting.  Symptoms for 1 day    Past Medical History:   Diagnosis Date     Abdominal wall cellulitis 2020     Abdominopelvic abscess (H) 2020     Anxiety      Asthma      Depression      DM (diabetes mellitus) (H)     with peripheral neuropathy     Fracture of lower extremity 2013     Frequent UTI      History of blood transfusion      History of ulcer disease 2014    She notes from NSAIDs; nearly . Discussed a hospitalization at [facility] for this.     Hypertension      Hypothyroid      Iatrogenic Cushing's disease (H)     off prednisone now     Infection due to alpha-hemolytic Streptococcus 2020     Morbid obesity (H)      MRSA (methicillin resistant staph aureus) culture positive 2012    repeat cx 10/12/2012 no staph mentioned.     ANIYA -- noncompliant with CPAP      Osteoarthritis     multiple joings.     Other chronic pain      Perforated duodenal ulcer -- S/P Surgery 2019     SBO (small bowel obstruction) (H) 2020     Sepsis (WBC 20K, ) 2020     Sleep apnea     No longer uses cpap.     Current Outpatient Medications   Medication Sig Dispense Refill     albuterol (PROVENTIL) (2.5 MG/3ML) 0.083% neb solution USE 1 VIAL VIA NEBULIZATION EVERY 4 HOURS AS NEEDED 75 mL 1     aspirin 81 MG EC tablet Take 81 mg by mouth daily       bisacodyl (DULCOLAX) 10 MG suppository Place 10 mg rectally 2 times daily as needed for constipation       desonide (DESOWEN) 0.05 % external ointment Apply topically 2 times daily 60 g 1     DULoxetine (CYMBALTA) 60 MG capsule Take 120 mg by mouth every morning       fluticasone-vilanterol (BREO ELLIPTA) 100-25 MCG/INH inhaler Inhale 1 puff into the lungs daily 28 each 5     gabapentin (NEURONTIN)  800 MG tablet Take 1,600 mg by mouth 3 times daily 0800, 1200 and 1800       hydrOXYzine (ATARAX) 50 MG tablet 50 mg 2 times daily as needed        hypromellose (ARTIFICIAL TEARS) 0.5 % SOLN ophthalmic solution 1 drop every hour as needed       ipratropium - albuterol 0.5 mg/2.5 mg/3 mL (DUONEB) 0.5-2.5 (3) MG/3ML neb solution Take 1 vial by nebulization every 6 hours as needed for shortness of breath / dyspnea or wheezing       ketoconazole (NIZORAL) 2 % external shampoo        levothyroxine (SYNTHROID/LEVOTHROID) 88 MCG tablet Take 1 tablet (88 mcg) by mouth daily 90 tablet 3     lisinopril (ZESTRIL) 2.5 MG tablet Take 1 tablet (2.5 mg) by mouth daily 90 tablet 3     metFORMIN (GLUCOPHAGE-XR) 500 MG 24 hr tablet TAKE 1 TABLET(500 MG) BY MOUTH TWICE DAILY WITH MEALS 180 tablet 3     methadone (DOLOPHINE) 10 MG tablet Take 2 tablets (20 mg) by mouth 2 times daily 20 tablet 0     miconazole (MICATIN/MICRO GUARD) 2 % external powder Apply topically 2 times daily as needed for other (topical candidiasis)  0     multivitamin, therapeutic (THERA-VIT) TABS tablet Take 1 tablet by mouth daily       ondansetron (ZOFRAN-ODT) 4 MG ODT tab Take 1 tablet (4 mg) by mouth every 6 hours as needed for nausea or vomiting       oxybutynin ER (DITROPAN XL) 15 MG 24 hr tablet Take 1 tablet (15 mg) by mouth daily 30 tablet 11     polyethylene glycol (MIRALAX/GLYCOLAX) packet Take 17 g by mouth 2 times daily as needed (constipation)  0     rosuvastatin (CRESTOR) 5 MG tablet TAKE 1 TABLET(5 MG) BY MOUTH AT BEDTIME 90 tablet 3     senna-docusate (SENOKOT-S/PERICOLACE) 8.6-50 MG tablet Take 2 tablets by mouth 2 times daily as needed for constipation 100 tablet 0     sulfamethoxazole-trimethoprim (BACTRIM DS) 800-160 MG tablet Take 1 tablet by mouth 2 times daily for 7 days 14 tablet 0     tiZANidine (ZANAFLEX) 4 MG tablet Take 4 mg by mouth 3 times daily as needed for muscle spasms        traZODone (DESYREL) 100 MG tablet Take 150 mg by  mouth At Bedtime       VENTOLIN  (90 Base) MCG/ACT inhaler INHALE 2 PUFFS INTO THE LUNGS EVERY 6 HOURS AS NEEDED FOR SHORTNESS OF BREATH OR DIFFICULT BREATHING 18 g 1     oxybutynin ER (DITROPAN XL) 15 MG 24 hr tablet Take 1 tablet (15 mg) by mouth daily 90 tablet 4     pantoprazole (PROTONIX) 40 MG EC tablet Take 1 tablet (40 mg) by mouth daily 90 tablet 3     Social History     Tobacco Use     Smoking status: Never Smoker     Smokeless tobacco: Never Used   Substance Use Topics     Alcohol use: No       ROS:   Review of systems negative except as stated above.    OBJECTIVE:  /75 (BP Location: Right arm, Patient Position: Sitting, Cuff Size: Adult Regular)   Pulse 67   Temp 99.1  F (37.3  C) (Tympanic)   Resp 20   SpO2 97%   GENERAL APPEARANCE: healthy, alert and no distress  RESP: lungs clear to auscultation - no rales, rhonchi or wheezes  CV: regular rates and rhythm, normal S1 S2, no murmur noted  BACK: No CVA tenderness  SKIN: no suspicious lesions or rashes      Results for orders placed or performed in visit on 09/28/22   Urine Microscopic     Status: Abnormal   Result Value Ref Range    Bacteria Urine Moderate (A) None Seen /HPF    RBC Urine 25-50 (A) 0-2 /HPF /HPF    WBC Urine 5-10 (A) 0-5 /HPF /HPF    Squamous Epithelials Urine Few (A) None Seen /LPF    Mucus Urine Present (A) None Seen /LPF    Narrative    Urine Culture not indicated       ASSESSMENT:   (R30.0) Dysuria  (primary encounter diagnosis)  Plan: Urine Microscopic,         sulfamethoxazole-trimethoprim (BACTRIM DS)         800-160 MG tablet, CANCELED: UA reflex to         Microscopic and Culture, CANCELED: Wet prep -         Clinic Collect

## 2022-10-09 ENCOUNTER — HEALTH MAINTENANCE LETTER (OUTPATIENT)
Age: 67
End: 2022-10-09

## 2022-10-10 ENCOUNTER — OFFICE VISIT (OUTPATIENT)
Dept: CARDIOLOGY | Facility: CLINIC | Age: 67
End: 2022-10-10
Attending: PHYSICIAN ASSISTANT
Payer: MEDICARE

## 2022-10-10 VITALS
HEART RATE: 78 BPM | BODY MASS INDEX: 41.48 KG/M2 | SYSTOLIC BLOOD PRESSURE: 120 MMHG | HEIGHT: 65 IN | OXYGEN SATURATION: 95 % | DIASTOLIC BLOOD PRESSURE: 81 MMHG | WEIGHT: 249 LBS

## 2022-10-10 DIAGNOSIS — I21.4 NSTEMI (NON-ST ELEVATED MYOCARDIAL INFARCTION) (H): ICD-10-CM

## 2022-10-10 DIAGNOSIS — E78.5 HYPERLIPIDEMIA LDL GOAL <100: ICD-10-CM

## 2022-10-10 DIAGNOSIS — I25.2 OLD MYOCARDIAL INFARCTION: ICD-10-CM

## 2022-10-10 DIAGNOSIS — I51.81 STRESS-INDUCED CARDIOMYOPATHY: Primary | ICD-10-CM

## 2022-10-10 DIAGNOSIS — I50.32 CHRONIC DIASTOLIC (CONGESTIVE) HEART FAILURE (H): ICD-10-CM

## 2022-10-10 PROCEDURE — 93010 ELECTROCARDIOGRAM REPORT: CPT | Performed by: INTERNAL MEDICINE

## 2022-10-10 PROCEDURE — 99214 OFFICE O/P EST MOD 30 MIN: CPT | Performed by: INTERNAL MEDICINE

## 2022-10-10 RX ORDER — ROSUVASTATIN CALCIUM 20 MG/1
TABLET, COATED ORAL
Qty: 90 TABLET | Refills: 3 | Status: SHIPPED | OUTPATIENT
Start: 2022-10-10 | End: 2023-02-07

## 2022-10-10 NOTE — PROGRESS NOTES
HPI and Plan:   See dictation    Today's clinic visit entailed:  Review of the result(s) of each unique test - echo, cardiac MRI, bmp, flp, alt, cbc  Ordering of each unique test  Prescription drug management  30 minutes spent on the date of the encounter doing chart review, review of test results, patient visit and documentation   Provider  Link to [facility] Help Grid     The level of medical decision making during this visit was of high complexity.      Orders Placed This Encounter   Procedures     Basic metabolic panel     Hemoglobin     Lipid Profile     ALT     Basic metabolic panel     Hemoglobin     Lipid Profile     ALT     Follow-Up with Cardiology [facility] CORE     Follow-Up with Cardiology CORE (Self)     EKG 12-lead complete w/read - Clinics (performed today)     Echocardiogram Complete       Orders Placed This Encounter   Medications     rosuvastatin (CRESTOR) 20 MG tablet     Sig: TAKE 1 TABLET(5 MG) BY MOUTH AT BEDTIME     Dispense:  90 tablet     Refill:  3       Medications Discontinued During This Encounter   Medication Reason     oxybutynin ER (DITROPAN XL) 15 MG 24 hr tablet      rosuvastatin (CRESTOR) 5 MG tablet      pantoprazole (PROTONIX) 40 MG EC tablet          Encounter Diagnoses   Name Primary?     Chronic diastolic (congestive) heart failure (H)      NSTEMI (non-ST elevated myocardial infarction) (H)      Hyperlipidemia LDL goal <100      Stress-induced cardiomyopathy Yes     Old myocardial infarction        CURRENT MEDICATIONS:  Current Outpatient Medications   Medication Sig Dispense Refill     albuterol (PROVENTIL) (2.5 MG/3ML) 0.083% neb solution USE 1 VIAL VIA NEBULIZATION EVERY 4 HOURS AS NEEDED 75 mL 1     aspirin 81 MG EC tablet Take 81 mg by mouth daily       bisacodyl (DULCOLAX) 10 MG suppository Place 10 mg rectally 2 times daily as needed for constipation       desonide (DESOWEN) 0.05 % external ointment Apply topically 2 times daily 60 g 1     DULoxetine (CYMBALTA) 60 MG capsule Take  120 mg by mouth every morning       fluticasone-vilanterol (BREO ELLIPTA) 100-25 MCG/INH inhaler Inhale 1 puff into the lungs daily 28 each 5     gabapentin (NEURONTIN) 800 MG tablet Take 1,600 mg by mouth 3 times daily 0800, 1200 and 1800       hydrOXYzine (ATARAX) 50 MG tablet 50 mg 2 times daily as needed        hypromellose (ARTIFICIAL TEARS) 0.5 % SOLN ophthalmic solution 1 drop every hour as needed       ipratropium - albuterol 0.5 mg/2.5 mg/3 mL (DUONEB) 0.5-2.5 (3) MG/3ML neb solution Take 1 vial by nebulization every 6 hours as needed for shortness of breath / dyspnea or wheezing       ketoconazole (NIZORAL) 2 % external shampoo        levothyroxine (SYNTHROID/LEVOTHROID) 88 MCG tablet Take 1 tablet (88 mcg) by mouth daily 90 tablet 3     lisinopril (ZESTRIL) 2.5 MG tablet Take 1 tablet (2.5 mg) by mouth daily 90 tablet 3     metFORMIN (GLUCOPHAGE-XR) 500 MG 24 hr tablet TAKE 1 TABLET(500 MG) BY MOUTH TWICE DAILY WITH MEALS 180 tablet 3     methadone (DOLOPHINE) 10 MG tablet Take 2 tablets (20 mg) by mouth 2 times daily 20 tablet 0     miconazole (MICATIN/MICRO GUARD) 2 % external powder Apply topically 2 times daily as needed for other (topical candidiasis)  0     multivitamin, therapeutic (THERA-VIT) TABS tablet Take 1 tablet by mouth daily       ondansetron (ZOFRAN-ODT) 4 MG ODT tab Take 1 tablet (4 mg) by mouth every 6 hours as needed for nausea or vomiting       oxybutynin ER (DITROPAN XL) 15 MG 24 hr tablet Take 1 tablet (15 mg) by mouth daily 30 tablet 11     polyethylene glycol (MIRALAX/GLYCOLAX) packet Take 17 g by mouth 2 times daily as needed (constipation)  0     rosuvastatin (CRESTOR) 20 MG tablet TAKE 1 TABLET(5 MG) BY MOUTH AT BEDTIME 90 tablet 3     senna-docusate (SENOKOT-S/PERICOLACE) 8.6-50 MG tablet Take 2 tablets by mouth 2 times daily as needed for constipation 100 tablet 0     tiZANidine (ZANAFLEX) 4 MG tablet Take 4 mg by mouth 3 times daily as needed for muscle spasms         "traZODone (DESYREL) 100 MG tablet Take 150 mg by mouth At Bedtime       VENTOLIN  (90 Base) MCG/ACT inhaler INHALE 2 PUFFS INTO THE LUNGS EVERY 6 HOURS AS NEEDED FOR SHORTNESS OF BREATH OR DIFFICULT BREATHING 18 g 1       ALLERGIES     Allergies   Allergen Reactions     Povidone Iodine      \"mild skin irritation\" per patient report     Toradol [Ketorolac] Other (See Comments)     Pt gets nightmares     Tramadol Other (See Comments)       PAST MEDICAL HISTORY:  Past Medical History:   Diagnosis Date     Abdominal wall cellulitis 2020     Abdominopelvic abscess (H) 2020     Anxiety      Asthma      Depression      DM (diabetes mellitus) (H)     with peripheral neuropathy     Fracture of lower extremity 2013     Frequent UTI      History of blood transfusion      History of ulcer disease 2014    She notes from NSAIDs; nearly . Discussed a hospitalization at [facility] for this.     Hypertension      Hypothyroid      Iatrogenic Cushing's disease (H)     off prednisone now     Infection due to alpha-hemolytic Streptococcus 2020     Morbid obesity (H)      MRSA (methicillin resistant staph aureus) culture positive 2012    repeat cx 10/12/2012 no staph mentioned.     ANIYA -- noncompliant with CPAP      Osteoarthritis     multiple joings.     Other chronic pain      Perforated duodenal ulcer -- S/P Surgery 2019     SBO (small bowel obstruction) (H) 2020     Sepsis (WBC 20K, ) 2020     Sleep apnea     No longer uses cpap.       PAST SURGICAL HISTORY:  Past Surgical History:   Procedure Laterality Date     ABDOMEN SURGERY       APPLY WOUND VAC  2019    Procedure: Apply Wound Vac;  Surgeon: Ayan Lopez MD;  Location:  OR     CLOSE SECONDARY WOUND ABDOMEN N/A 2020    Procedure: SECONDARY ABDOMINAL WOUND CLOSURE;  Surgeon: Ayan Lopez MD;  Location:  OR     CYSTOSCOPY N/A 2018    Procedure: CYSTOSCOPY;  " Cystoscopy and Botox Injection Into the Bladder and suprapubic tube exchange;  Surgeon: Yoandy Rios MD;  [facility]     CYSTOSCOPY N/A 3/15/2019    Procedure: Cystoscopy, suprapubic tube exchange;  Surgeon: Yoandy Rios MD;  [facility]     CYSTOSCOPY FLEXIBLE, CYSTOSTOMY, INSERT TUBE SUPRAPUBIC, COMBINED N/A 11/1/2019    Procedure: Cystoscopy, Bladder Botox Injection, Suprapubic Tube Change;  Surgeon: Yoandy Rios MD;  [facility]     CYSTOSCOPY, INTRAVESICAL INJECTION N/A 8/23/2017    Procedure: CYSTOSCOPY, INTRAVESICAL INJECTION;  Cystoscopy, Botox Injection Into The Bladder  Latex Allergy ;  Surgeon: Yoandy Rios MD;  [facility]: [facility]     CYSTOSCOPY, INTRAVESICAL INJECTION N/A 3/8/2018    Procedure: CYSTOSCOPY, INTRAVESICAL INJECTION;  Cystoscopy, Botox Injection Into The Bladder and suprapubic catheter exchange;  Surgeon: Yoandy Rios MD;  [facility]     DISCECTOMY, FUSION CERVICAL ANTERIOR THREE+ LEVELS, COMBINED Left 5/3/2016    Procedure: COMBINED DISCECTOMY, FUSION CERVICAL ANTERIOR THREE+ LEVELS;  Surgeon: Jasvir Torres MD;  Location: [facility]     ENTEROSCOPY SMALL BOWEL N/A 6/8/2020    Procedure: Enteroscopy small bowel;  Surgeon: Gabe Forte MD;  Location:  GI     GASTROSTOMY, INSERT TUBE, COMBINED N/A 1/1/2020    Procedure: GASTRIC-JEJUNAL FEEDING TUBE INSERTION;  Surgeon: Ayan Lopez MD;  Location:  OR     GENITOURINARY SURGERY      suprapubic catheter     HERNIA REPAIR  1957    double hernia     HYSTERECTOMY, PAP NO LONGER INDICATED      CELIA and large ovarian tumor removed     INJECT BOTOX N/A 9/21/2018    Procedure: INJECT BOTOX;;  Surgeon: Yoandy Rios MD;  [facility]     INJECT BOTOX N/A 3/15/2019    Procedure: Inject Botox into Bladder;  Surgeon: Yoandy Rios MD;  [facility]     IR GASTRO JEJUNOSTOMY TUBE PLACEMENT  6/11/2020     IR GASTROSTOMY TUBE CHANGE  6/30/2020      IR JEJUNOSTOMY TUBE CHANGE  2/4/2020     IR JEJUNOSTOMY TUBE CHANGE  2/19/2020     LAPAROSCOPY DIAGNOSTIC (GENERAL) N/A 12/30/2019    Procedure: Laparoscopy diagnostic (general);  Surgeon: Ayan Lopez MD;  Location: [facility]     LAPAROTOMY EXPLORATORY N/A 1/1/2020    Procedure: EXPLORATORY LAPAROTOMY;  Surgeon: Ayan Lopez MD;  Location: [facility]     LAPAROTOMY EXPLORATORY N/A 12/30/2019    Procedure: LAPAROTOMY, REPAIR OF ULCER PERFORATION;  Surgeon: Ayan Lopez MD;  Location: [facility] OR     SURGICAL HISTORY OF -       left cataract surgery     SURGICAL HISTORY OF -   12/14    right cataract surgery and left laser revision     SURGICAL HISTORY OF -   March 2015    left carpal tunnel release     TONSILLECTOMY  1974     ZZC LAMINECTOMY,FACETECTOMY,LUMBAR  1982     ZZC TOTAL KNEE ARTHROPLASTY  1999    left       FAMILY HISTORY:  Family History   Problem Relation Age of Onset     Hypertension Mother      Heart Disease Mother         bypass     Depression Mother      Hypertension Father      Connective Tissue Disorder Father         ankylosing spondylitis     Cancer Father         colon; prostate     Other Cancer Father 89        bladder cancer     Depression Sister      Depression Son        SOCIAL HISTORY:  Social History     Socioeconomic History     Marital status:      Spouse name: Sergio     Number of children: 2     Years of education: None     Highest education level: Bachelor's degree (e.g., BA, AB, BS)   Tobacco Use     Smoking status: Never     Smokeless tobacco: Never   Vaping Use     Vaping Use: Never used   Substance and Sexual Activity     Alcohol use: No     Drug use: Yes     Types: Marijuana     Comment: Medical canibis, methadone for chronic back pain     Sexual activity: Not Currently   Social History Narrative    1 son passed away    1 son transfemale     Social Determinants of Health     Financial Resource Strain: Low Risk      Difficulty of Paying Living Expenses: Not very  "hard   Food Insecurity: No Food Insecurity     Worried About Running Out of Food in the Last Year: Never true     Ran Out of Food in the Last Year: Never true   Transportation Needs: No Transportation Needs     Lack of Transportation (Medical): No     Lack of Transportation (Non-Medical): No   Physical Activity: Inactive     Days of Exercise per Week: 0 days     Minutes of Exercise per Session: 0 min   Stress: No Stress Concern Present     Feeling of Stress : Not at all   Social Connections: Unknown     Frequency of Communication with Friends and Family: More than three times a week     Frequency of Social Gatherings with Friends and Family: Patient refused     Attends Faith Services: Patient refused     Active Member of Clubs or Organizations: No     Marital Status:    Housing Stability: Low Risk      Unable to Pay for Housing in the Last Year: No     Number of Places Lived in the Last Year: 1     Unstable Housing in the Last Year: No       Review of Systems:  Skin:  not assessed       Eyes:  not assessed      ENT:  not assessed      Respiratory:  Positive for wheezing;sleep apnea pt has asthma, albuterol helps. can't wear cpap   Cardiovascular:  Negative      Gastroenterology: not assessed      Genitourinary:  not assessed      Musculoskeletal:  not assessed      Neurologic:  not assessed      Psychiatric:  not assessed      Heme/Lymph/Imm:  not assessed      Endocrine:  Positive for thyroid disorder hypothryroid, uses levothyroxine    Physical Exam:  Vitals: /81 (BP Location: Left arm, Patient Position: Sitting)   Pulse 78   Ht 1.651 m (5' 5\")   Wt 112.9 kg (249 lb)   SpO2 95%   BMI 41.44 kg/m      Constitutional:           Skin:             Head:           Eyes:           Lymph:      ENT:           Neck:           Respiratory:            Cardiac:                                                           GI:           Extremities and Muscular Skeletal:                 Neurological:       "     Psych:           CC  Tere Shen PA-C  [address]  [place] [address]

## 2022-10-10 NOTE — PATIENT INSTRUCTIONS
It was a pleasure seeing you today and thank you for allowing me to be a part of your health care team.  Should you have any questions regarding your visit or future needs please feel free to reach out to my care team for assistance.      Thank you, Dr. Ant Paula        **Nursing: [phone]       **Scheduling: [phone]

## 2022-10-10 NOTE — LETTER
10/10/2022    Karen Santos MD  [address] [facility] [address]    RE: Bhavani White       Dear Colleague,     I had the [facility].  HPI and Plan:   See dictation    Today's clinic visit entailed:  Review of the result(s) of each unique test - echo, cardiac MRI, bmp, flp, alt, cbc  Ordering of each unique test  Prescription drug management  30 minutes spent on the date of the encounter doing chart review, review of test results, patient visit and documentation   Provider  Link to [facility] Help Grid     The level of medical decision making during this visit was of high complexity.      Orders Placed This Encounter   Procedures     Basic metabolic panel     Hemoglobin     Lipid Profile     ALT     Basic metabolic panel     Hemoglobin     Lipid Profile     ALT     Follow-Up with Cardiology FIONA CORE     Follow-Up with Cardiology CORE (Self)     EKG 12-lead complete w/read - Clinics (performed today)     Echocardiogram Complete       Orders Placed This Encounter   Medications     rosuvastatin (CRESTOR) 20 MG tablet     Sig: TAKE 1 TABLET(5 MG) BY MOUTH AT BEDTIME     Dispense:  90 tablet     Refill:  3       Medications Discontinued During This Encounter   Medication Reason     oxybutynin ER (DITROPAN XL) 15 MG 24 hr tablet      rosuvastatin (CRESTOR) 5 MG tablet      pantoprazole (PROTONIX) 40 MG EC tablet          Encounter Diagnoses   Name Primary?     Chronic diastolic (congestive) heart failure (H)      NSTEMI (non-ST elevated myocardial infarction) (H)      Hyperlipidemia LDL goal <100      Stress-induced cardiomyopathy Yes     Old myocardial infarction        CURRENT MEDICATIONS:  Current Outpatient Medications   Medication Sig Dispense Refill     albuterol (PROVENTIL) (2.5 MG/3ML) 0.083% neb solution USE 1 VIAL VIA NEBULIZATION EVERY 4 HOURS AS NEEDED 75 mL 1     aspirin 81 MG EC tablet Take 81 mg by mouth daily       bisacodyl (DULCOLAX) 10 MG  suppository Place 10 mg rectally 2 times daily as needed for constipation       desonide (DESOWEN) 0.05 % external ointment Apply topically 2 times daily 60 g 1     DULoxetine (CYMBALTA) 60 MG capsule Take 120 mg by mouth every morning       fluticasone-vilanterol (BREO ELLIPTA) 100-25 MCG/INH inhaler Inhale 1 puff into the lungs daily 28 each 5     gabapentin (NEURONTIN) 800 MG tablet Take 1,600 mg by mouth 3 times daily 0800, 1200 and 1800       hydrOXYzine (ATARAX) 50 MG tablet 50 mg 2 times daily as needed        hypromellose (ARTIFICIAL TEARS) 0.5 % SOLN ophthalmic solution 1 drop every hour as needed       ipratropium - albuterol 0.5 mg/2.5 mg/3 mL (DUONEB) 0.5-2.5 (3) MG/3ML neb solution Take 1 vial by nebulization every 6 hours as needed for shortness of breath / dyspnea or wheezing       ketoconazole (NIZORAL) 2 % external shampoo        levothyroxine (SYNTHROID/LEVOTHROID) 88 MCG tablet Take 1 tablet (88 mcg) by mouth daily 90 tablet 3     lisinopril (ZESTRIL) 2.5 MG tablet Take 1 tablet (2.5 mg) by mouth daily 90 tablet 3     metFORMIN (GLUCOPHAGE-XR) 500 MG 24 hr tablet TAKE 1 TABLET(500 MG) BY MOUTH TWICE DAILY WITH MEALS 180 tablet 3     methadone (DOLOPHINE) 10 MG tablet Take 2 tablets (20 mg) by mouth 2 times daily 20 tablet 0     miconazole (MICATIN/MICRO GUARD) 2 % external powder Apply topically 2 times daily as needed for other (topical candidiasis)  0     multivitamin, therapeutic (THERA-VIT) TABS tablet Take 1 tablet by mouth daily       ondansetron (ZOFRAN-ODT) 4 MG ODT tab Take 1 tablet (4 mg) by mouth every 6 hours as needed for nausea or vomiting       oxybutynin ER (DITROPAN XL) 15 MG 24 hr tablet Take 1 tablet (15 mg) by mouth daily 30 tablet 11     polyethylene glycol (MIRALAX/GLYCOLAX) packet Take 17 g by mouth 2 times daily as needed (constipation)  0     rosuvastatin (CRESTOR) 20 MG tablet TAKE 1 TABLET(5 MG) BY MOUTH AT BEDTIME 90 tablet 3     senna-docusate (SENOKOT-S/PERICOLACE)  "8.6-50 MG tablet Take 2 tablets by mouth 2 times daily as needed for constipation 100 tablet 0     tiZANidine (ZANAFLEX) 4 MG tablet Take 4 mg by mouth 3 times daily as needed for muscle spasms        traZODone (DESYREL) 100 MG tablet Take 150 mg by mouth At Bedtime       VENTOLIN  (90 Base) MCG/ACT inhaler INHALE 2 PUFFS INTO THE LUNGS EVERY 6 HOURS AS NEEDED FOR SHORTNESS OF BREATH OR DIFFICULT BREATHING 18 g 1       ALLERGIES     Allergies   Allergen Reactions     Povidone Iodine      \"mild skin irritation\" per patient report     Toradol [Ketorolac] Other (See Comments)     Pt gets nightmares     Tramadol Other (See Comments)       PAST MEDICAL HISTORY:  Past Medical History:   Diagnosis Date     Abdominal wall cellulitis 2020     Abdominopelvic abscess (H) 2020     Anxiety      Asthma      Depression      DM (diabetes mellitus) (H)     with peripheral neuropathy     Fracture of lower extremity 2013     Frequent UTI      History of blood transfusion      History of ulcer disease 2014    She notes from NSAIDs; nearly . Discussed a hospitalization at [place] for this.     Hypertension      Hypothyroid      Iatrogenic Cushing's disease (H)     off prednisone now     Infection due to alpha-hemolytic Streptococcus 2020     Morbid obesity (H)      MRSA (methicillin resistant staph aureus) culture positive 2012    repeat cx 10/12/2012 no staph mentioned.     ANIYA -- noncompliant with CPAP      Osteoarthritis     multiple joings.     Other chronic pain      Perforated duodenal ulcer -- S/P Surgery 2019     SBO (small bowel obstruction) (H) 2020     Sepsis (WBC 20K, ) 2020     Sleep apnea     No longer uses cpap.       PAST SURGICAL HISTORY:  Past Surgical History:   Procedure Laterality Date     ABDOMEN SURGERY       APPLY WOUND VAC  2019    Procedure: Apply Wound Vac;  Surgeon: Ayan Lopez MD;  Location:  OR     CLOSE " SECONDARY WOUND ABDOMEN N/A 1/1/2020    Procedure: SECONDARY ABDOMINAL WOUND CLOSURE;  Surgeon: Ayan Lopez MD;  Location:  OR     CYSTOSCOPY N/A 9/21/2018    Procedure: CYSTOSCOPY;  Cystoscopy and Botox Injection Into the Bladder and suprapubic tube exchange;  Surgeon: Yoandy Rios MD;  [facility]     CYSTOSCOPY N/A 3/15/2019    Procedure: Cystoscopy, suprapubic tube exchange;  Surgeon: Yoandy Rios MD;  [facility]     CYSTOSCOPY FLEXIBLE, CYSTOSTOMY, INSERT TUBE SUPRAPUBIC, COMBINED N/A 11/1/2019    Procedure: Cystoscopy, Bladder Botox Injection, Suprapubic Tube Change;  Surgeon: Yoandy Rios MD;  [facility]     CYSTOSCOPY, INTRAVESICAL INJECTION N/A 8/23/2017    Procedure: CYSTOSCOPY, INTRAVESICAL INJECTION;  Cystoscopy, Botox Injection Into The Bladder  Latex Allergy ;  Surgeon: Yoandy Rios MD;  [facility]     CYSTOSCOPY, INTRAVESICAL INJECTION N/A 3/8/2018    Procedure: CYSTOSCOPY, INTRAVESICAL INJECTION;  Cystoscopy, Botox Injection Into The Bladder and suprapubic catheter exchange;  Surgeon: Yoandy Rios MD;  [facility] OR     DISCECTOMY, FUSION CERVICAL ANTERIOR THREE+ LEVELS, COMBINED Left 5/3/2016    Procedure: COMBINED DISCECTOMY, FUSION CERVICAL ANTERIOR THREE+ LEVELS;  Surgeon: Jasvir Torres MD;  [facility] OR     ENTEROSCOPY SMALL BOWEL N/A 6/8/2020    Procedure: Enteroscopy small bowel;  Surgeon: Gabe Forte MD;  Location:  GI     GASTROSTOMY, INSERT TUBE, COMBINED N/A 1/1/2020    Procedure: GASTRIC-JEJUNAL FEEDING TUBE INSERTION;  Surgeon: Ayan Lopez MD;  Location:  OR     GENITOURINARY SURGERY      suprapubic catheter     HERNIA REPAIR  1957    double hernia     HYSTERECTOMY, PAP NO LONGER INDICATED      CELIA and large ovarian tumor removed     INJECT BOTOX N/A 9/21/2018    Procedure: INJECT BOTOX;;  Surgeon: Yoandy Rios MD;  Location:  OR     INJECT BOTOX N/A  3/15/2019    Procedure: Inject Botox into Bladder;  Surgeon: Yoandy Rios MD;  [facility]     IR GASTRO JEJUNOSTOMY TUBE PLACEMENT  6/11/2020     IR GASTROSTOMY TUBE CHANGE  6/30/2020     IR JEJUNOSTOMY TUBE CHANGE  2/4/2020     IR JEJUNOSTOMY TUBE CHANGE  2/19/2020     LAPAROSCOPY DIAGNOSTIC (GENERAL) N/A 12/30/2019    Procedure: Laparoscopy diagnostic (general);  Surgeon: Ayan Lopez MD;  Location: [facility] OR     LAPAROTOMY EXPLORATORY N/A 1/1/2020    Procedure: EXPLORATORY LAPAROTOMY;  Surgeon: Ayan Lopez MD;  Location: [facility] OR     LAPAROTOMY EXPLORATORY N/A 12/30/2019    Procedure: LAPAROTOMY, REPAIR OF ULCER PERFORATION;  Surgeon: Ayan Lopez MD;  Location: [facility] OR     SURGICAL HISTORY OF -       left cataract surgery     SURGICAL HISTORY OF -   12/14    right cataract surgery and left laser revision     SURGICAL HISTORY OF -   March 2015    left carpal tunnel release     TONSILLECTOMY  1974     ZZC LAMINECTOMY,FACETECTOMY,LUMBAR  1982     ZZC TOTAL KNEE ARTHROPLASTY  1999    left       FAMILY HISTORY:  Family History   Problem Relation Age of Onset     Hypertension Mother      Heart Disease Mother         bypass     Depression Mother      Hypertension Father      Connective Tissue Disorder Father         ankylosing spondylitis     Cancer Father         colon; prostate     Other Cancer Father 89        bladder cancer     Depression Sister      Depression Son        SOCIAL HISTORY:  Social History     Socioeconomic History     Marital status:      Spouse name: Sergio     Number of children: 2     Years of education: None     Highest education level: Bachelor's degree (e.g., BA, AB, BS)   Tobacco Use     Smoking status: Never     Smokeless tobacco: Never   Vaping Use     Vaping Use: Never used   Substance and Sexual Activity     Alcohol use: No     Drug use: Yes     Types: Marijuana     Comment: Medical canibis, methadone for chronic back pain     Sexual activity: Not  "Currently   Social History Narrative    1 son passed away    1 son transfemale     Social Determinants of Health     Financial Resource Strain: Low Risk      Difficulty of Paying Living Expenses: Not very hard   Food Insecurity: No Food Insecurity     Worried About Running Out of Food in the Last Year: Never true     Ran Out of Food in the Last Year: Never true   Transportation Needs: No Transportation Needs     Lack of Transportation (Medical): No     Lack of Transportation (Non-Medical): No   Physical Activity: Inactive     Days of Exercise per Week: 0 days     Minutes of Exercise per Session: 0 min   Stress: No Stress Concern Present     Feeling of Stress : Not at all   Social Connections: Unknown     Frequency of Communication with Friends and Family: More than three times a week     Frequency of Social Gatherings with Friends and Family: Patient refused     Attends Buddhist Services: Patient refused     Active Member of Clubs or Organizations: No     Marital Status:    Housing Stability: Low Risk      Unable to Pay for Housing in the Last Year: No     Number of Places Lived in the Last Year: 1     Unstable Housing in the Last Year: No       Review of Systems:  Skin:  not assessed       Eyes:  not assessed      ENT:  not assessed      Respiratory:  Positive for wheezing;sleep apnea pt has asthma, albuterol helps. can't wear cpap   Cardiovascular:  Negative      Gastroenterology: not assessed      Genitourinary:  not assessed      Musculoskeletal:  not assessed      Neurologic:  not assessed      Psychiatric:  not assessed      Heme/Lymph/Imm:  not assessed      Endocrine:  Positive for thyroid disorder hypothryroid, uses levothyroxine    Physical Exam:  Vitals: /81 (BP Location: Left arm, Patient Position: Sitting)   Pulse 78   Ht 1.651 m (5' 5\")   Wt 112.9 kg (249 lb)   SpO2 95%   BMI 41.44 kg/m      Constitutional:           Skin:             Head:           Eyes:           Lymph:      ENT:  "          Neck:           Respiratory:            Cardiac:                                                           GI:           Extremities and Muscular Skeletal:                 Neurological:           Psych:           CC  Tere Shen, MISSAEL  [address] [facility] [address]  [place] [address]                Service Date: 10/10/2022    CARDIOLOGY OFFICE PROGRESS NOTE    PRIMARY CARE PHYSICIAN:  Karen Santos MD     HISTORY OF PRESENT ILLNESS:  I had the opportunity to see Ms. Bhavani White in Cardiology Clinic today at [facility] in [place] for reevaluation of stress cardiomyopathy and evidence of old inferoapical myocardial infarction.    Ms. White is a 66-year-old woman with hypertension, dyslipidemia and type 2 diabetes who was hospitalized in 12/2019 with a perforated duodenal ulcer.  She was in septic shock with respiratory failure and suffered a type 2 nontransmural myocardial infarction.  Her echocardiogram showed an ejection fraction of 30% with a global pattern of hypokinesis, most consistent with stress cardiomyopathy.  Prior to her discharge, the echo was repeated and showed an improved left ventricular ejection fraction to 60%.  A followup cardiac MRI in 05/2020 showed a normal ejection fraction of 67% with a small area of transmural infarction involving the distal inferior wall and inferior apex without any inducible myocardial ischemia using Lexiscan infusion.  That study demonstrated normal right ventricular size and function.    She believes she was told that she had a heart attack during an episode of severe back pain, hospitalization and surgery in 2016.  She thinks the infarction might have occurred then or perhaps during this most recent hospitalization when she had a perforated duodenal ulcer.  I do not see any elevated troponin levels in her record.    She has had persistent ascites since her hospitalization with a perforated duodenal ulcer.  She has had some  recurrent abdominal abscesses, but has now been feeling well without any ongoing abdominal pain issues.    Her laboratory studies show an elevated LDL cholesterol of 136, normal basic metabolic panel, normal CBC, hemoglobin A1c of 5.9, normal TSH.    PHYSICAL EXAMINATION:  Her blood pressure is 120/81, heart rate 78 and weight 249 pounds.  Her lungs are clear.  Heart rhythm is regular.  She has no cardiac murmurs or carotid bruits.    IMPRESSIONS:  Ms. Michelle White is a 66-year-old woman with a history of stress cardiomyopathy diagnosed at the time of a perforated duodenal ulcer and critical illness.  Subsequent followup with a stress MRI showed evidence of a small transmural infarction involving the distal inferior wall and apex with no inducible ischemia.  Her echocardiogram more recently again shows normal left ventricular function, but suggested the possibility of right ventricular enlargement and dysfunction.  Her cardiac MRI had demonstrated normal right ventricular size and function.  She does have a small amount of ascites identified on multiple studies, including ultrasound studies of the abdomen and CAT scans.  I do not think this is related to congestive heart failure.  In fact, she seems to be doing really well without any shortness of breath, abdominal pain, chest pain or other cardiac issues.    I recommended that we increase her rosuvastatin to 20 mg a day to manage her cholesterol numbers better, but I do not think there are any other significant cardiac issues that need more aggressive treatment at this time.    I will have her follow up with us again in 6 months for reevaluation.  I will see her back again in 1 year.    cc:  [place] [address]    Ant Paula MD, [facility]        D: 10/10/2022   T: 10/10/2022   MT: toi    Name:     MICHELLE WHITE  MRN:      [id number]        Account:      [id number]   :       1955           Service Date: 10/10/2022       Document: [id number]    Thank you for allowing me to participate in the care of your patient.      Sincerely,     TORI REYES MD     [facility]  cc:   Tere Shen PA-C  [address] [address]

## 2022-10-10 NOTE — PROGRESS NOTES
Service Date: 10/10/2022    CARDIOLOGY OFFICE PROGRESS NOTE    PRIMARY CARE PHYSICIAN:  Karen Santos MD     HISTORY OF PRESENT ILLNESS:  I had the opportunity to see Ms. Bhavani White in Cardiology Clinic today at [facility] in [place] for reevaluation of stress cardiomyopathy and evidence of old inferoapical myocardial infarction.    Ms. White is a 66-year-old woman with hypertension, dyslipidemia and type 2 diabetes who was hospitalized in 12/2019 with a perforated duodenal ulcer.  She was in septic shock with respiratory failure and suffered a type 2 nontransmural myocardial infarction.  Her echocardiogram showed an ejection fraction of 30% with a global pattern of hypokinesis, most consistent with stress cardiomyopathy.  Prior to her discharge, the echo was repeated and showed an improved left ventricular ejection fraction to 60%.  A followup cardiac MRI in 05/2020 showed a normal ejection fraction of 67% with a small area of transmural infarction involving the distal inferior wall and inferior apex without any inducible myocardial ischemia using Lexiscan infusion.  That study demonstrated normal right ventricular size and function.    She believes she was told that she had a heart attack during an episode of severe back pain, hospitalization and surgery in 2016.  She thinks the infarction might have occurred then or perhaps during this most recent hospitalization when she had a perforated duodenal ulcer.  I do not see any elevated troponin levels in her record.    She has had persistent ascites since her hospitalization with a perforated duodenal ulcer.  She has had some recurrent abdominal abscesses, but has now been feeling well without any ongoing abdominal pain issues.    Her laboratory studies show an elevated LDL cholesterol of 136, normal basic metabolic panel, normal CBC, hemoglobin A1c of 5.9, normal TSH.    PHYSICAL EXAMINATION:  Her blood pressure is 120/81, heart rate 78 and weight  249 pounds.  Her lungs are clear.  Heart rhythm is regular.  She has no cardiac murmurs or carotid bruits.    IMPRESSIONS:  Ms. Michelle White is a 66-year-old woman with a history of stress cardiomyopathy diagnosed at the time of a perforated duodenal ulcer and critical illness.  Subsequent followup with a stress MRI showed evidence of a small transmural infarction involving the distal inferior wall and apex with no inducible ischemia.  Her echocardiogram more recently again shows normal left ventricular function, but suggested the possibility of right ventricular enlargement and dysfunction.  Her cardiac MRI had demonstrated normal right ventricular size and function.  She does have a small amount of ascites identified on multiple studies, including ultrasound studies of the abdomen and CAT scans.  I do not think this is related to congestive heart failure.  In fact, she seems to be doing really well without any shortness of breath, abdominal pain, chest pain or other cardiac issues.    I recommended that we increase her rosuvastatin to 20 mg a day to manage her cholesterol numbers better, but I do not think there are any other significant cardiac issues that need more aggressive treatment at this time.    I will have her follow up with us again in 6 months for reevaluation.  I will see her back again in 1 year.    cc:  [place] [address]    [facility] Paula MD, [facility]        D: 10/10/2022   T: 10/10/2022   MT: toi    Name:     MICHELLE WHITE  MRN:      [id number]        Account:      [id number]   :      1955           Service Date: 10/10/2022       Document: [id number]

## 2022-11-03 ENCOUNTER — MEDICAL CORRESPONDENCE (OUTPATIENT)
Dept: HEALTH INFORMATION MANAGEMENT | Facility: CLINIC | Age: 67
End: 2022-11-03

## 2022-11-03 ENCOUNTER — TELEPHONE (OUTPATIENT)
Dept: UROLOGY | Facility: CLINIC | Age: 67
End: 2022-11-03

## 2022-11-03 ENCOUNTER — TELEPHONE (OUTPATIENT)
Dept: FAMILY MEDICINE | Facility: CLINIC | Age: 67
End: 2022-11-03

## 2022-11-03 ENCOUNTER — LAB REQUISITION (OUTPATIENT)
Dept: LAB | Facility: CLINIC | Age: 67
End: 2022-11-03
Payer: MEDICARE

## 2022-11-03 DIAGNOSIS — N39.0 URINARY TRACT INFECTION, SITE NOT SPECIFIED: ICD-10-CM

## 2022-11-03 LAB
ALBUMIN UR-MCNC: NEGATIVE MG/DL
AMORPH CRY #/AREA URNS HPF: ABNORMAL /HPF
APPEARANCE UR: CLEAR
BACTERIA #/AREA URNS HPF: ABNORMAL /HPF
BILIRUB UR QL STRIP: NEGATIVE
COLOR UR AUTO: ABNORMAL
GLUCOSE UR STRIP-MCNC: NEGATIVE MG/DL
HGB UR QL STRIP: NEGATIVE
KETONES UR STRIP-MCNC: NEGATIVE MG/DL
LEUKOCYTE ESTERASE UR QL STRIP: ABNORMAL
NITRATE UR QL: POSITIVE
PH UR STRIP: 6 [PH] (ref 5–7)
RBC URINE: 1 /HPF
SP GR UR STRIP: 1.01 (ref 1–1.03)
UROBILINOGEN UR STRIP-MCNC: NORMAL MG/DL
WBC URINE: 3 /HPF

## 2022-11-03 PROCEDURE — 81001 URINALYSIS AUTO W/SCOPE: CPT | Mod: ORL | Performed by: UROLOGY

## 2022-11-03 PROCEDURE — 87088 URINE BACTERIA CULTURE: CPT | Mod: ORL | Performed by: UROLOGY

## 2022-11-03 NOTE — TELEPHONE ENCOUNTER
Wojciech [phone] from [facility] called requesting orders.    Verbal ok given to move recertification visit to tomorrow 11/4 from next Monday 11/7 due to staffing.    Nurse visit q 4 wks for catheter change with 2 prn visits x 9 wks.    Kianna Branham RN

## 2022-11-03 NOTE — TELEPHONE ENCOUNTER
[facility] Call Center    Phone Message    May a detailed message be left on voicemail: yes     Reason for Call: Order(s): Other:   Reason for requested: [facility]  Date needed: asap  Provider name: Dr. Rios      Action Taken: Message routed to:  [facility] ([facility]    Travel Screening: Not Applicable

## 2022-11-03 NOTE — TELEPHONE ENCOUNTER
Writer called and spoke to Rika. Writer stated orders are in, can be faxed if needed. Rika said they are dropping off urine at [facility], writer said they will be able to see order. Writer asked about pt's preop, Rika unsure when it is but they are seeing PCP tomorrow for other matters, Rika will mention the need for a preop to pt to get done before surgery.

## 2022-11-06 LAB
BACTERIA UR CULT: ABNORMAL
BACTERIA UR CULT: ABNORMAL

## 2022-11-08 ENCOUNTER — MEDICAL CORRESPONDENCE (OUTPATIENT)
Dept: HEALTH INFORMATION MANAGEMENT | Facility: CLINIC | Age: 67
End: 2022-11-08

## 2022-11-08 ENCOUNTER — TELEPHONE (OUTPATIENT)
Dept: UROLOGY | Facility: CLINIC | Age: 67
End: 2022-11-08

## 2022-11-08 DIAGNOSIS — N31.9 NEUROGENIC BLADDER: Primary | ICD-10-CM

## 2022-11-08 DIAGNOSIS — N39.0 RECURRENT UTI: ICD-10-CM

## 2022-11-08 RX ORDER — NITROFURANTOIN 25; 75 MG/1; MG/1
100 CAPSULE ORAL 2 TIMES DAILY
Qty: 10 CAPSULE | Refills: 0 | Status: SHIPPED | OUTPATIENT
Start: 2022-11-13 | End: 2022-11-18

## 2022-11-08 NOTE — TELEPHONE ENCOUNTER
----- Message from Hernán Tellez MD sent at 11/7/2022  6:18 PM CST -----  Regarding: RE:  11/4/22  Let's do 5 days of NF    Thanks!  Elaine  ----- Message -----  From: Ivet Snow CMA  Sent: 11/7/2022   9:33 AM CST  To: Hernán Tellez MD  Subject: FW:  11/4/22                                   [facility] is back, no abx allergies on file. Please advise.      ----- Message -----  From: Ivet Snow CMA  Sent: 11/7/2022  12:00 AM CST  To: Ivet Snow CMA  Subject:  11/4/22                                       [facility] 11/4/22 surgery 11/18         PATIENT INFORMATION    Anticipatory guidance  Bicycle helmets  Chores and other responsibilities  Drugs, ETOH, and tobacco  Importance of regular dental care  Importance of regular exercise  Importance of varied diet  Library card; limiting TV, media violence  Minimize junk food  Puberty  Safe storage of any firearms in the home  Seat belts  Smoke detectors; home fire drills  Teach child how to deal with strangers  Teach pedestrian safety    Follow-Up  - Return for your yearly well child visit. 89 years old Health and Safety Tips - The following hyperlinks are available to access via [employer]    Parent Education from [employer] Parent    EducaciÃ³n para padres sobre niÃ±os sanos    Additional Educational Resources: For additional resources regarding your symptoms, diagnosis, or further health information, please visit the Discover a Healthier You section on [email or web address].[email or web address]/ or the [facility] section in [facility].

## 2022-11-14 ASSESSMENT — ASTHMA QUESTIONNAIRES
ACT_TOTALSCORE: 22
QUESTION_5 LAST FOUR WEEKS HOW WOULD YOU RATE YOUR ASTHMA CONTROL: WELL CONTROLLED
QUESTION_4 LAST FOUR WEEKS HOW OFTEN HAVE YOU USED YOUR RESCUE INHALER OR NEBULIZER MEDICATION (SUCH AS ALBUTEROL): ONCE A WEEK OR LESS
QUESTION_2 LAST FOUR WEEKS HOW OFTEN HAVE YOU HAD SHORTNESS OF BREATH: ONCE OR TWICE A WEEK
QUESTION_1 LAST FOUR WEEKS HOW MUCH OF THE TIME DID YOUR ASTHMA KEEP YOU FROM GETTING AS MUCH DONE AT WORK, SCHOOL OR AT HOME: NONE OF THE TIME
ACT_TOTALSCORE: 22
QUESTION_3 LAST FOUR WEEKS HOW OFTEN DID YOUR ASTHMA SYMPTOMS (WHEEZING, COUGHING, SHORTNESS OF BREATH, CHEST TIGHTNESS OR PAIN) WAKE YOU UP AT NIGHT OR EARLIER THAN USUAL IN THE MORNING: NOT AT ALL

## 2022-11-15 ENCOUNTER — OFFICE VISIT (OUTPATIENT)
Dept: FAMILY MEDICINE | Facility: CLINIC | Age: 67
End: 2022-11-15
Payer: MEDICARE

## 2022-11-15 VITALS
WEIGHT: 242 LBS | HEART RATE: 78 BPM | OXYGEN SATURATION: 94 % | DIASTOLIC BLOOD PRESSURE: 82 MMHG | BODY MASS INDEX: 40.32 KG/M2 | SYSTOLIC BLOOD PRESSURE: 136 MMHG | HEIGHT: 65 IN | TEMPERATURE: 98.6 F

## 2022-11-15 DIAGNOSIS — G82.20 PARAPLEGIA (H): ICD-10-CM

## 2022-11-15 DIAGNOSIS — N18.2 CHRONIC KIDNEY DISEASE, STAGE 2 (MILD): ICD-10-CM

## 2022-11-15 DIAGNOSIS — E11.21 TYPE 2 DIABETES MELLITUS WITH DIABETIC NEPHROPATHY, UNSPECIFIED WHETHER LONG TERM INSULIN USE (H): ICD-10-CM

## 2022-11-15 DIAGNOSIS — Z93.59 OTHER CYSTOSTOMY STATUS (H): ICD-10-CM

## 2022-11-15 DIAGNOSIS — N31.9 NEUROGENIC BLADDER: ICD-10-CM

## 2022-11-15 DIAGNOSIS — E66.01 MORBID OBESITY (H): ICD-10-CM

## 2022-11-15 DIAGNOSIS — I51.81 STRESS-INDUCED CARDIOMYOPATHY: ICD-10-CM

## 2022-11-15 DIAGNOSIS — M79.7 FIBROMYALGIA: ICD-10-CM

## 2022-11-15 DIAGNOSIS — I50.32 CHRONIC DIASTOLIC (CONGESTIVE) HEART FAILURE (H): ICD-10-CM

## 2022-11-15 DIAGNOSIS — Z01.818 PREOPERATIVE CLEARANCE: Primary | ICD-10-CM

## 2022-11-15 LAB
ALBUMIN SERPL BCG-MCNC: 4.2 G/DL (ref 3.5–5.2)
ALP SERPL-CCNC: 90 U/L (ref 35–104)
ALT SERPL W P-5'-P-CCNC: 20 U/L (ref 10–35)
ANION GAP SERPL CALCULATED.3IONS-SCNC: 10 MMOL/L (ref 7–15)
AST SERPL W P-5'-P-CCNC: 27 U/L (ref 10–35)
BILIRUB SERPL-MCNC: 0.2 MG/DL
BUN SERPL-MCNC: 33.2 MG/DL (ref 8–23)
CALCIUM SERPL-MCNC: 10 MG/DL (ref 8.8–10.2)
CHLORIDE SERPL-SCNC: 98 MMOL/L (ref 98–107)
CREAT SERPL-MCNC: 0.8 MG/DL (ref 0.51–0.95)
CREAT UR-MCNC: 42.6 MG/DL
DEPRECATED HCO3 PLAS-SCNC: 29 MMOL/L (ref 22–29)
ERYTHROCYTE [DISTWIDTH] IN BLOOD BY AUTOMATED COUNT: 14.1 % (ref 10–15)
GFR SERPL CREATININE-BSD FRML MDRD: 81 ML/MIN/1.73M2
GLUCOSE SERPL-MCNC: 111 MG/DL (ref 70–99)
HBA1C MFR BLD: 5.8 % (ref 0–5.6)
HCT VFR BLD AUTO: 40.8 % (ref 35–47)
HGB BLD-MCNC: 12.8 G/DL (ref 11.7–15.7)
MCH RBC QN AUTO: 25.7 PG (ref 26.5–33)
MCHC RBC AUTO-ENTMCNC: 31.4 G/DL (ref 31.5–36.5)
MCV RBC AUTO: 82 FL (ref 78–100)
MICROALBUMIN UR-MCNC: 19.4 MG/L
MICROALBUMIN/CREAT UR: 45.54 MG/G CR (ref 0–25)
PLATELET # BLD AUTO: 262 10E3/UL (ref 150–450)
POTASSIUM SERPL-SCNC: 4.8 MMOL/L (ref 3.4–5.3)
PROT SERPL-MCNC: 7.5 G/DL (ref 6.4–8.3)
RBC # BLD AUTO: 4.99 10E6/UL (ref 3.8–5.2)
SODIUM SERPL-SCNC: 137 MMOL/L (ref 136–145)
WBC # BLD AUTO: 7.2 10E3/UL (ref 4–11)

## 2022-11-15 PROCEDURE — 36415 COLL VENOUS BLD VENIPUNCTURE: CPT | Performed by: INTERNAL MEDICINE

## 2022-11-15 PROCEDURE — 83036 HEMOGLOBIN GLYCOSYLATED A1C: CPT | Performed by: INTERNAL MEDICINE

## 2022-11-15 PROCEDURE — 85027 COMPLETE CBC AUTOMATED: CPT | Performed by: INTERNAL MEDICINE

## 2022-11-15 PROCEDURE — 80053 COMPREHEN METABOLIC PANEL: CPT | Performed by: INTERNAL MEDICINE

## 2022-11-15 PROCEDURE — 82043 UR ALBUMIN QUANTITATIVE: CPT | Performed by: INTERNAL MEDICINE

## 2022-11-15 PROCEDURE — 99215 OFFICE O/P EST HI 40 MIN: CPT | Performed by: INTERNAL MEDICINE

## 2022-11-15 RX ORDER — HYDROMORPHONE HYDROCHLORIDE 4 MG/1
TABLET ORAL
COMMUNITY
Start: 2022-09-09 | End: 2022-11-15

## 2022-11-15 RX ORDER — OXYCODONE AND ACETAMINOPHEN 5; 325 MG/1; MG/1
TABLET ORAL
COMMUNITY
Start: 2022-06-13 | End: 2022-11-15

## 2022-11-15 RX ORDER — BACLOFEN 10 MG/1
TABLET ORAL
COMMUNITY
Start: 2022-10-04 | End: 2023-01-01

## 2022-11-15 RX ORDER — TRAZODONE HYDROCHLORIDE 150 MG/1
150 TABLET ORAL AT BEDTIME
COMMUNITY
Start: 2022-09-09 | End: 2023-01-01

## 2022-11-15 RX ORDER — MORPHINE SULFATE 15 MG/1
TABLET ORAL
COMMUNITY
Start: 2022-07-19 | End: 2022-11-15

## 2022-11-15 RX ORDER — AMOXICILLIN 500 MG/1
CAPSULE ORAL
COMMUNITY
Start: 2022-09-06 | End: 2023-01-01

## 2022-11-15 RX ORDER — CHLORHEXIDINE GLUCONATE ORAL RINSE 1.2 MG/ML
SOLUTION DENTAL
COMMUNITY
Start: 2022-09-07 | End: 2023-01-01

## 2022-11-15 ASSESSMENT — ANXIETY QUESTIONNAIRES
GAD7 TOTAL SCORE: 6
2. NOT BEING ABLE TO STOP OR CONTROL WORRYING: NOT AT ALL
1. FEELING NERVOUS, ANXIOUS, OR ON EDGE: SEVERAL DAYS
8. IF YOU CHECKED OFF ANY PROBLEMS, HOW DIFFICULT HAVE THESE MADE IT FOR YOU TO DO YOUR WORK, TAKE CARE OF THINGS AT HOME, OR GET ALONG WITH OTHER PEOPLE?: SOMEWHAT DIFFICULT
5. BEING SO RESTLESS THAT IT IS HARD TO SIT STILL: NEARLY EVERY DAY
GAD7 TOTAL SCORE: 6
4. TROUBLE RELAXING: NOT AT ALL
IF YOU CHECKED OFF ANY PROBLEMS ON THIS QUESTIONNAIRE, HOW DIFFICULT HAVE THESE PROBLEMS MADE IT FOR YOU TO DO YOUR WORK, TAKE CARE OF THINGS AT HOME, OR GET ALONG WITH OTHER PEOPLE: SOMEWHAT DIFFICULT
6. BECOMING EASILY ANNOYED OR IRRITABLE: SEVERAL DAYS
7. FEELING AFRAID AS IF SOMETHING AWFUL MIGHT HAPPEN: NOT AT ALL
7. FEELING AFRAID AS IF SOMETHING AWFUL MIGHT HAPPEN: NOT AT ALL
3. WORRYING TOO MUCH ABOUT DIFFERENT THINGS: SEVERAL DAYS
GAD7 TOTAL SCORE: 6

## 2022-11-15 ASSESSMENT — PATIENT HEALTH QUESTIONNAIRE - PHQ9
10. IF YOU CHECKED OFF ANY PROBLEMS, HOW DIFFICULT HAVE THESE PROBLEMS MADE IT FOR YOU TO DO YOUR WORK, TAKE CARE OF THINGS AT HOME, OR GET ALONG WITH OTHER PEOPLE: SOMEWHAT DIFFICULT
SUM OF ALL RESPONSES TO PHQ QUESTIONS 1-9: 2
SUM OF ALL RESPONSES TO PHQ QUESTIONS 1-9: 2

## 2022-11-15 ASSESSMENT — PAIN SCALES - GENERAL: PAINLEVEL: NO PAIN (0)

## 2022-11-15 NOTE — PROGRESS NOTES
[address] [address]  Phone: [phone]  Primary Provider: Karen Santos  Pre-op Performing Provider: FRANCESCA SEXTON      PREOPERATIVE EVALUATION:  Today's date: 11/15/2022    Bhavani White is a 67 year old female who presents for a preoperative evaluation.    Surgical Information:  Surgery/Procedure:CYSTOSCOPY   Surgery Location: [facility] OR  Surgeon: MD MARSH  Surgery Date: 11/18/2022  Time of Surgery: 2:25 PM  Where patient plans to recover: At home with family  Fax number for surgical facility: Note does not need to be faxed, will be available electronically in Epic.    Type of Anesthesia Anticipated: Local with MAC    Assessment & Plan     The proposed surgical procedure is considered LOW risk.    Assessment and Plan  1. Preoperative clearance  2. Neurogenic bladder, S/P Suprapubic Catheter   3. Other cystostomy status (H)  4. Paraplegia (H)  Pt  is here for Preop clearance of Neurogenic bladder S/P Suprapubic catheter and scheduled for CYSTOSCOPY, WITH BOTULINUM TOXIN INJECTION under MAC with local.  -  She does have risk factors of Paraplegia wheel chair bound , Ventral hernia , OA of cervical spine with myelopathy , ANIYA on CPAP, DMT2 with neuropathy, Asthma ; On Methadone. Last labs in 6/2022 normal BMP, CBC.  Last MRI abdomen on 8/29/22 showing 8 mm Rt renal lesion needing recheck in 6 months . Last Echo in 3/2020 positive for LVEF 60-65%. , Grade II or moderate diastolic dysfunction. , The right ventricle is moderately dilated. Last EKG in 10/2022 showing RBBB , ischemia which was seen also in the past.   - CBC with platelets; Future  - Comprehensive metabolic panel (BMP + Alb, Alk Phos, ALT, AST, Total. Bili, TP); Future  - CBC with platelets  - Comprehensive metabolic panel (BMP + Alb, Alk Phos, ALT, AST, Total. Bili, TP)      5. Type 2 diabetes mellitus with diabetic nephropathy, unspecified whether long term insulin use  (H)  Well-controlled on current A1c check due at this time at 5.8%.  Continue current metformin.  - HEMOGLOBIN A1C; Future  - HEMOGLOBIN A1C    6. Morbid obesity (H)  Risk factors including sleep apnea noncompliant with CPAP.    7. Chronic kidney disease, stage 2 (mild)  - Albumin Random Urine Quantitative with Creat Ratio; Future  - Albumin Random Urine Quantitative with Creat Ratio    8. Chronic diastolic (congestive) heart failure (H)  9. Stress-induced cardiomyopathy  Chronic stable condition, well controlled with no signs of CHF at this time.Recent Cardiology visit on 10/10/22 for Stress induced CM and Chronic diastolic CHF.   - Past Hx of hypertension, dyslipidemia and type 2 diabetes who was hospitalized in 12/2019 with a perforated duodenal ulcer.  She was in septic shock with respiratory failure and suffered a type 2 nontransmural myocardial infarction.  Her echocardiogram showed an ejection fraction of 30% with a global pattern of hypokinesis, most consistent with stress cardiomyopathy.  Prior to her discharge, the echo was repeated and showed an improved left ventricular ejection fraction to 60%.  A followup cardiac MRI in 05/2020 showed a normal ejection fraction of 67% with a small area of transmural infarction involving the distal inferior wall and inferior apex without any inducible myocardial ischemia using Lexiscan infusion.  Currently stable and asymptomatic.  - BETA BLOCKER NOT PRESCRIBED (INTENTIONAL); Please choose reason not prescribed from choices below.    10. Fibromyalgia, Chronic Pain -- On Methadone  Patient follows pain clinic for her chronic pain.- At [facility] [place].      Patient Instructions     As discussed , please do pertinent labs ordered .   Please hold Metformin on the day of surgery since you will not eat food on that day. Can resume after surgery once you start eating.     ===========================================================  Preparing  for Your Surgery  Getting started  A nurse will call you to review your health history and instructions. They will give you an arrival time based on your scheduled surgery time. Please be ready to share:    Your doctor s clinic name and phone number    Your medical, surgical, and anesthesia history    A list of allergies and sensitivities    A list of medicines, including herbal treatments and over-the-counter drugs    Whether the patient has a legal guardian (ask how to send us the papers in advance)  Please tell us if you re pregnant--or if there s any chance you might be pregnant. Some surgeries may injure a fetus (unborn baby), so they require a pregnancy test. Surgeries that are safe for a fetus don t always need a test, and you can choose whether to have one.   If you have a child who s having surgery, please ask for a copy of Preparing for Your Child s Surgery.    Preparing for surgery  1. Within 10 to 30 days of surgery: Have a pre-op exam (sometimes called an H&P, or History and Physical). This can be done at a clinic or pre-operative center.  ? If you re having a , you may not need this exam. Talk to your care team.  2. At your pre-op exam, talk to your care team about all medicines you take. If you need to stop any medicines before surgery, ask when to start taking them again.  ? We do this for your safety. Many medicines can make you bleed too much during surgery. Some change how well surgery (anesthesia) drugs work.  3. Call your insurance company to let them know you re having surgery. (If you don t have insurance, call [phone].)  4. Call your clinic if there s any change in your health. This includes signs of a cold or flu (sore throat, runny nose, cough, rash, fever). It also includes a scrape or scratch near the surgery site.  5. If you have questions on the day of surgery, call your hospital or surgery center.  COVID testing  You may need to be tested for COVID-19 before having  surgery. If so, we will give you instructions (or click here).  Eating and drinking guidelines  For your safety: Unless your surgeon tells you otherwise, follow the guidelines below.    Eat and drink as usual until 8 hours before you arrive for surgery. After that, no food or milk.    Drink clear liquids until 2 hours before you arrive. These are liquids you can see through, like water, Gatorade, and Propel Water. They also include plain black coffee and tea (no cream or milk), candy, and breath mints. You can spit out gum when you arrive.    If you drink alcohol: Stop drinking it the night before surgery.    If your care team tells you to take medicine on the morning of surgery, it s okay to take it with a sip of water.  Preventing infection  1. Shower or bathe the night before and morning of your surgery. Follow the instructions your clinic gave you. (If no instructions, use regular soap.)  2. Don t shave or clip hair near your surgery site. We ll remove the hair if needed.  3. Don t smoke or vape the morning of surgery. You may chew nicotine gum up to 2 hours before surgery. A nicotine patch is okay.  ? Note: Some surgeries require you to completely quit smoking and nicotine. Check with your surgeon.  4. Your care team will make every effort to keep you safe from infection. We will:  ? Clean our hands often with soap and water (or an alcohol-based hand rub).  ? Clean the skin at your surgery site with a special soap that kills germs.  ? Give you a special gown to keep you warm. (Cold raises the risk of infection.)  ? Wear special hair covers, masks, gowns and gloves during surgery.  ? Give antibiotic medicine, if prescribed. Not all surgeries need antibiotics.  What to bring on the day of surgery  1. Photo ID and insurance card  2. Copy of your health care directive, if you have one  3. Glasses and hearing aids (bring cases)  ? You can t wear contacts during surgery  4. Inhaler and eye drops, if you use them  (tell us about these when you arrive)  5. CPAP machine or breathing device, if you use them  6. A few personal items, if spending the night  7. If you have . . .  ? A pacemaker, ICD (cardiac defibrillator) or other implant: Bring the ID card.  ? An implanted stimulator: Bring the remote control.  ? A legal guardian: Bring a copy of the certified (court-stamped) guardianship papers.  Please remove any jewelry, including body piercings. Leave jewelry and other valuables at home.  If you re going home the day of surgery    You must have a responsible adult drive you home. They should stay with you overnight as well.    If you don t have someone to stay with you, and you aren t safe to go home alone, we may keep you overnight. Insurance often won t pay for this.  After surgery  If it s hard to control your pain or you need more pain medicine, please call your surgeon s office.  Questions?   If you have any questions for your care team, list them here:   ____________________________________________________________________________________________________________________________________________________________________________________________________________________________________________________________________  For informational purposes only. Not to replace the advice of your health care provider. Copyright   2003, 2019 [facility]. All rights reserved. Clinically reviewed by Deanna Bertrand MD. [employer] [id number] - REV 10/22.      Return in about 4 weeks (around 12/13/2022), or if symptoms worsen or fail to improve, for If symptoms persist, Follow up of last visit Establish Care . .    Poornima Wall MD  [facility] [place]         Risks and Recommendations:  The patient has the following additional risks and recommendations for perioperative complications:   - Consult Hospitalist / IM to assist with post-op medical management   - Morbid obesity (BMI >40)  Cardiovascular:   - Stress induced  CM and Chronic diastolic CHF , reviewed recent Cardiology visit note which is stable.   Diabetes:  - Patient is not on insulin therapy: regular NPO guidelines can be followed.   Pulmonary:    - Incentive spirometry post-op   - Consider Respiratory Therapy (Respiratory Care IP Consult) post-op   - Does have Asthma on inhalers  Obstructive Sleep Apnea: Non Compliant with CPAP    Infection:    - Patient has a history of MRSA    Medication Instructions:  Patient is to take all scheduled medications on the day of surgery EXCEPT for modifications listed below:   - aspirin: Discontinue aspirin 7-10 days prior to procedure to reduce bleeding risk. It should be resumed postoperatively.    - ACE/ARB: May be continued on the day of surgery.    - Statins: Continue taking on the day of surgery.    - metformin: HOLD day of surgery.   - Antiepileptics: Continue without modification.  - Opiates : Continue Methadone at the same dose   - SSRIs, SNRIs, TCAs, Antipsychotics: Continue without modification.    - LABA, inhaled corticosteroid, long-acting anticholinergics: Continue without modification.    RECOMMENDATION:  APPROVAL GIVEN to proceed with proposed procedure, without further diagnostic evaluation.    Review of external notes as documented above     40 minutes spent on the date of the encounter doing chart review, review of outside records, review of test results, interpretation of tests, patient visit and documentation         Subjective     HPI related to upcoming procedure:     Pt is new to me follows ARJUN Ga as PCP. She is here for Preop clearance    Preop Questions 11/14/2022   1. Have you ever had a heart attack or stroke? YES    2. Have you ever had surgery on your heart or blood vessels, such as a stent placement, a coronary artery bypass, or surgery on an artery in your head, neck, heart, or legs? No   3. Do you have chest pain with activity? No   4. Do you have a history of  heart failure? YES    5. Do you  currently have a cold, bronchitis or symptoms of other infection? No   6. Do you have a cough, shortness of breath, or wheezing? No   7. Do you or anyone in your family have previous history of blood clots? No   8. Do you or does anyone in your family have a serious bleeding problem such as prolonged bleeding following surgeries or cuts? No   9. Have you ever had problems with anemia or been told to take iron pills? No   10. Have you had any abnormal blood loss such as black, tarry or bloody stools, or abnormal vaginal bleeding? No   11. Have you ever had a blood transfusion? No   11a. Have you ever had a transfusion reaction? -   12. Are you willing to have a blood transfusion if it is medically needed before, during, or after your surgery? Yes   13. Have you or any of your relatives ever had problems with anesthesia? No   14. Do you have sleep apnea, excessive snoring or daytime drowsiness? YES    14a. Do you have a CPAP machine? No   15. Do you have any artifical heart valves or other implanted medical devices like a pacemaker, defibrillator, or continuous glucose monitor? No   16. Do you have artificial joints? YES    17. Are you allergic to latex? No   18. Is there any chance that you may be pregnant? -       Health Care Directive:  Patient has a Health Care Directive on file      Preoperative Review of :   reviewed - controlled substances prescribed by other outside provider(s).    Status of Chronic Conditions:  See problem list for active medical problems.  Problems all longstanding and stable, except as noted/documented.  See ROS for pertinent symptoms related to these conditions.      Review of Systems  CONSTITUTIONAL: NEGATIVE for fever, chills, change in weight  INTEGUMENTARY/SKIN: NEGATIVE for worrisome rashes, moles or lesions  EYES: NEGATIVE for vision changes or irritation  ENT/MOUTH: NEGATIVE for ear, mouth and throat problems  RESP: NEGATIVE for significant cough or SOB  CV: NEGATIVE for chest  pain, palpitations or peripheral edema  GI: NEGATIVE for nausea, abdominal pain, heartburn, or change in bowel habits  : NEGATIVE for frequency, dysuria, or hematuria  MUSCULOSKELETAL: NEGATIVE for significant arthralgias or myalgia  NEURO: NEGATIVE for weakness, dizziness or paresthesias  ENDOCRINE: NEGATIVE for temperature intolerance, skin/hair changes  HEME: NEGATIVE for bleeding problems  PSYCHIATRIC: NEGATIVE for changes in mood or affect    Patient Active Problem List    Diagnosis Date Noted     Stress-induced cardiomyopathy 11/15/2022     Priority: Medium     Recurrent major depressive disorder, in full remission (H) 03/02/2022     Priority: Medium     Chronic kidney disease, stage 2 (mild) 11/29/2021     Priority: Medium     Paraplegia (H) 04/30/2021     Priority: Medium     Chronic diastolic (congestive) heart failure (H) 03/19/2021     Priority: Medium     ANIYA -- noncompliant with CPAP 01/18/2020     Priority: Medium     Fusion of spine of cervical region 01/07/2020     Priority: Medium     NSTEMI (non-ST elevated myocardial infarction) (H) 12/30/2019     Priority: Medium     Osteoarthritis of cervical spine with myelopathy 03/04/2016     Priority: Medium     DISH (diffuse idiopathic skeletal hyperostosis) 03/04/2016     Priority: Medium     Morbid obesity (H) 10/17/2015     Priority: Medium     Overview:   Body mass index is 47.93 kg/(m^^2) .       Wheelchair bound 08/15/2015     Priority: Medium     Neurogenic bladder, S/P Suprapubic Catheter  03/19/2015     Priority: Medium     Ventral hernia 03/19/2015     Priority: Medium     Recurrent UTI (urinary tract infection) 10/24/2014     Priority: Medium     Controlled substance agreement signed 10/24/2014 10/24/2014     Priority: Medium     She had a controlled substance agreement with Ekta Gaffney until seen by pain clinic. Now followed by pain clinic; see chronic pain       Pain medication agreement 10/25/2013     Priority: Medium     Formatting of this  note might be different from the original.  Violated contract 2014- see encounter 10/1/14 Gabriela Suero M.D.   10/1/2014  9:30 AM       Other cystostomy status (H) 2012     Priority: Medium     Overview:   MRSA + urine 12       MRSA (methicillin resistant staph aureus) culture positive 2012     Priority: Medium     Formatting of this note might be different from the original.  S/P catheter- + MRSA culture 12- follow up urine culture 10/12=e.coli, no staph mentioned.       Hyperlipidemia LDL goal <100 2011     Priority: Medium     Neuropathy 2011     Priority: Medium     Asthma 02/15/2011     Priority: Medium     Lumbar spinal stenosis 2011     Priority: Medium     Fibromyalgia, Chronic Pain -- On Methadone 2011     Priority: Medium     Osteoarthritis 2011     Priority: Medium     Hypertension goal BP (blood pressure) < 140/90 2011     Priority: Medium     Health Care Home 2011     Priority: Medium       Status:  No active care coordination   Care Coordinator:  Wendy Pearson [phone]  See Letters for [facility] Care Plan  Date:  May 23, 2016           Hypothyroidism due to acquired atrophy of thyroid 2010     Priority: Medium     DM type 2, w Neuropathy -- Hgb A1C 7.1 on 1/7/20 10/31/2010     Priority: Medium     Chronic low back pain 2009     Priority: Medium     1981       Moderate major depression (H) 2009     Priority: Medium     Anxiety 2009     Priority: Medium      Past Medical History:   Diagnosis Date     Abdominal wall cellulitis 2020     Abdominopelvic abscess (H) 2020     Anxiety      Asthma      Depression      DM (diabetes mellitus) (H)     with peripheral neuropathy     Fracture of lower extremity 2013     Frequent UTI      History of blood transfusion      History of ulcer disease 2014    She notes from NSAIDs; nearly . Discussed a hospitalization at [place] for this.      Hypertension      Hypothyroid      Iatrogenic Cushing's disease (H)     off prednisone now     Infection due to alpha-hemolytic Streptococcus 02/01/2020     Morbid obesity (H)      MRSA (methicillin resistant staph aureus) culture positive 05/24/2012    repeat cx 10/12/2012 no staph mentioned.     ANIYA -- noncompliant with CPAP      Osteoarthritis     multiple joings.     Other chronic pain      Perforated duodenal ulcer -- S/P Surgery 1/1/2020 12/29/2019     SBO (small bowel obstruction) (H) 06/03/2020     Sepsis (WBC 20K, ) 03/16/2020     Sleep apnea     No longer uses cpap.     Past Surgical History:   Procedure Laterality Date     ABDOMEN SURGERY       APPLY WOUND VAC  12/30/2019    Procedure: Apply Wound Vac;  Surgeon: Ayan Lopez MD;  Location: [facility] OR     CLOSE SECONDARY WOUND ABDOMEN N/A 1/1/2020    Procedure: SECONDARY ABDOMINAL WOUND CLOSURE;  Surgeon: Ayan Lopez MD;  Location: [facility] OR     CYSTOSCOPY N/A 9/21/2018    Procedure: CYSTOSCOPY;  Cystoscopy and Botox Injection Into the Bladder and suprapubic tube exchange;  Surgeon: Yoandy Rios MD;  Location: [facility]     CYSTOSCOPY N/A 3/15/2019    Procedure: Cystoscopy, suprapubic tube exchange;  Surgeon: Yoandy Rios MD;  [facility]: [facility]     CYSTOSCOPY FLEXIBLE, CYSTOSTOMY, INSERT TUBE SUPRAPUBIC, COMBINED N/A 11/1/2019    Procedure: Cystoscopy, Bladder Botox Injection, Suprapubic Tube Change;  Surgeon: Yoandy Rios MD;  [facility]: [facility]     CYSTOSCOPY, INTRAVESICAL INJECTION N/A 8/23/2017    Procedure: CYSTOSCOPY, INTRAVESICAL INJECTION;  Cystoscopy, Botox Injection Into The Bladder  Latex Allergy ;  Surgeon: Yoandy Rios MD;  Location: [facility] OR     CYSTOSCOPY, INTRAVESICAL INJECTION N/A 3/8/2018    Procedure: CYSTOSCOPY, INTRAVESICAL INJECTION;  Cystoscopy, Botox Injection Into The Bladder and suprapubic catheter exchange;  Surgeon: Yoandy Rios MD;  [facility]: [facility] OR     DISCECTOMY, FUSION  CERVICAL ANTERIOR THREE+ LEVELS, COMBINED Left 5/3/2016    Procedure: COMBINED DISCECTOMY, FUSION CERVICAL ANTERIOR THREE+ LEVELS;  Surgeon: Jasvir Torres MD;  Location: [facility] OR     ENTEROSCOPY SMALL BOWEL N/A 6/8/2020    Procedure: Enteroscopy small bowel;  Surgeon: Gabe Forte MD;  Location:  GI     GASTROSTOMY, INSERT TUBE, COMBINED N/A 1/1/2020    Procedure: GASTRIC-JEJUNAL FEEDING TUBE INSERTION;  Surgeon: Ayan Lopez MD;  Location:  OR     GENITOURINARY SURGERY      suprapubic catheter     HERNIA REPAIR  1957    double hernia     HYSTERECTOMY, PAP NO LONGER INDICATED      CELIA and large ovarian tumor removed     INJECT BOTOX N/A 9/21/2018    Procedure: INJECT BOTOX;;  Surgeon: Yoandy Rios MD;  [facility]     INJECT BOTOX N/A 3/15/2019    Procedure: Inject Botox into Bladder;  Surgeon: Yoandy Rios MD;  [facility]     IR GASTRO JEJUNOSTOMY TUBE PLACEMENT  6/11/2020     IR GASTROSTOMY TUBE CHANGE  6/30/2020     IR JEJUNOSTOMY TUBE CHANGE  2/4/2020     IR JEJUNOSTOMY TUBE CHANGE  2/19/2020     LAPAROSCOPY DIAGNOSTIC (GENERAL) N/A 12/30/2019    Procedure: Laparoscopy diagnostic (general);  Surgeon: Ayan Lopez MD;  Location:  OR     LAPAROTOMY EXPLORATORY N/A 1/1/2020    Procedure: EXPLORATORY LAPAROTOMY;  Surgeon: Ayan Lopez MD;  Location:  OR     LAPAROTOMY EXPLORATORY N/A 12/30/2019    Procedure: LAPAROTOMY, REPAIR OF ULCER PERFORATION;  Surgeon: Ayan Lopez MD;  Location:  OR     SURGICAL HISTORY OF -       left cataract surgery     SURGICAL HISTORY OF -   12/14    right cataract surgery and left laser revision     SURGICAL HISTORY OF -   March 2015    left carpal tunnel release     TONSILLECTOMY  1974     Z LAMINECTOMY,FACETECTOMY,LUMBAR  1982     Z TOTAL KNEE ARTHROPLASTY  1999    left     Current Outpatient Medications   Medication Sig Dispense Refill     albuterol (PROVENTIL) (2.5 MG/3ML) 0.083% neb  solution USE 1 VIAL VIA NEBULIZATION EVERY 4 HOURS AS NEEDED 75 mL 1     aspirin 81 MG EC tablet Take 81 mg by mouth daily       BETA BLOCKER NOT PRESCRIBED (INTENTIONAL) Please choose reason not prescribed from choices below.       bisacodyl (DULCOLAX) 10 MG suppository Place 10 mg rectally 2 times daily as needed for constipation       desonide (DESOWEN) 0.05 % external ointment Apply topically 2 times daily 60 g 1     DULoxetine (CYMBALTA) 60 MG capsule Take 120 mg by mouth every morning       fluticasone-vilanterol (BREO ELLIPTA) 100-25 MCG/INH inhaler Inhale 1 puff into the lungs daily 28 each 5     gabapentin (NEURONTIN) 800 MG tablet Take 1,600 mg by mouth 3 times daily 0800, 1200 and 1800       hydrOXYzine (ATARAX) 50 MG tablet 50 mg 2 times daily as needed        hypromellose (ARTIFICIAL TEARS) 0.5 % SOLN ophthalmic solution 1 drop every hour as needed       ipratropium - albuterol 0.5 mg/2.5 mg/3 mL (DUONEB) 0.5-2.5 (3) MG/3ML neb solution Take 1 vial by nebulization every 6 hours as needed for shortness of breath / dyspnea or wheezing       ketoconazole (NIZORAL) 2 % external shampoo        levothyroxine (SYNTHROID/LEVOTHROID) 88 MCG tablet Take 1 tablet (88 mcg) by mouth daily 90 tablet 3     lisinopril (ZESTRIL) 2.5 MG tablet Take 1 tablet (2.5 mg) by mouth daily 90 tablet 3     metFORMIN (GLUCOPHAGE-XR) 500 MG 24 hr tablet TAKE 1 TABLET(500 MG) BY MOUTH TWICE DAILY WITH MEALS 180 tablet 3     methadone (DOLOPHINE) 10 MG tablet Take 2 tablets (20 mg) by mouth 2 times daily 20 tablet 0     miconazole (MICATIN/MICRO GUARD) 2 % external powder Apply topically 2 times daily as needed for other (topical candidiasis)  0     multivitamin, therapeutic (THERA-VIT) TABS tablet Take 1 tablet by mouth daily       nitroFURantoin macrocrystal-monohydrate (MACROBID) 100 MG capsule Take 1 capsule (100 mg) by mouth 2 times daily for 5 days 10 capsule 0     ondansetron (ZOFRAN-ODT) 4 MG ODT tab Take 1 tablet (4 mg) by  "mouth every 6 hours as needed for nausea or vomiting       oxybutynin ER (DITROPAN XL) 15 MG 24 hr tablet Take 1 tablet (15 mg) by mouth daily 30 tablet 11     polyethylene glycol (MIRALAX/GLYCOLAX) packet Take 17 g by mouth 2 times daily as needed (constipation)  0     rosuvastatin (CRESTOR) 20 MG tablet TAKE 1 TABLET(5 MG) BY MOUTH AT BEDTIME 90 tablet 3     senna-docusate (SENOKOT-S/PERICOLACE) 8.6-50 MG tablet Take 2 tablets by mouth 2 times daily as needed for constipation 100 tablet 0     tiZANidine (ZANAFLEX) 4 MG tablet Take 4 mg by mouth 3 times daily as needed for muscle spasms        traZODone (DESYREL) 100 MG tablet Take 150 mg by mouth At Bedtime       VENTOLIN  (90 Base) MCG/ACT inhaler INHALE 2 PUFFS INTO THE LUNGS EVERY 6 HOURS AS NEEDED FOR SHORTNESS OF BREATH OR DIFFICULT BREATHING 18 g 1     amoxicillin (AMOXIL) 500 MG capsule TAKE 1 CAPSULE BY MOUTH THREE TIMES DAILY WITH MEALS FOR 14 DAYS       baclofen (LIORESAL) 10 MG tablet TAKE 1/2 TO 1 TABLET BY MOUTH TWICE DAILY AS NEEDED FOR MUSCLE SPASMS       chlorhexidine (PERIDEX) 0.12 % solution START 24 HOURS FOLLOWING SURGERY GENTLY SWISH WITH 1 CAPFUL FOR 60 SECONDS THEN SPIT OUT THREE TIMES DAILY FOR 10 DAYS       naloxone (NARCAN) 4 MG/0.1ML nasal spray CALL 911. SPR CONTENTS OF ONE SPRAYER (0.1ML) INTO ONE NOSTRIL. REPEAT IN 2-3 MIN IF SYMPTOMS OF OPIOID EMERGENCY PERSIST, ALTERNATE NOSTRILS       traZODone (DESYREL) 150 MG tablet Take 150 mg by mouth At Bedtime         Allergies   Allergen Reactions     Povidone Iodine      \"mild skin irritation\" per patient report     Toradol [Ketorolac] Other (See Comments)     Pt gets nightmares     Tramadol Other (See Comments)        Social History     Tobacco Use     Smoking status: Never     Smokeless tobacco: Never   Substance Use Topics     Alcohol use: No     Family History   Problem Relation Age of Onset     Hypertension Mother      Heart Disease Mother         bypass     Depression Mother  " "    Hypertension Father      Connective Tissue Disorder Father         ankylosing spondylitis     Cancer Father         colon; prostate     Other Cancer Father 89        bladder cancer     Depression Sister      Depression Son      History   Drug Use     Types: Marijuana     Comment: Medical canibis, methadone for chronic back pain         Objective     /82   Pulse 78   Temp 98.6  F (37  C) (Temporal)   Ht 1.651 m (5' 5\")   Wt 109.8 kg (242 lb)   SpO2 94%   BMI 40.27 kg/m      Physical Exam    GENERAL APPEARANCE: healthy, alert and no distress     EYES: EOMI, PERRL     HENT: ear canals and TM's normal and nose and mouth without ulcers or lesions     NECK: no adenopathy, no asymmetry, masses, or scars and thyroid normal to palpation     RESP: lungs clear to auscultation - no rales, rhonchi or wheezes     CV: regular rates and rhythm, normal S1 S2, no S3 or S4 and no murmur, click or rub     ABDOMEN:  soft, nontender, no HSM or masses and bowel sounds normal. Positive for irregular healing scars on the skin with previous abiominal surgeries history , ventral hernia and intact Suprapubic catheter in the LLQ below the abdominal skin folds, no signs of infection around it.     MS: extremities normal- no gross deformities noted, no evidence of inflammation in joints, FROM in all extremities.     SKIN: no suspicious lesions or rashes     NEURO: Normal strength in upper extremities , Lower extremities paraplegic , sensory exam grossly normal, mentation intact and speech normal     PSYCH: mentation appears normal. and affect normal/bright     LYMPHATICS: No cervical adenopathy    Recent Labs   Lab Test 08/17/22  1345 06/30/22  1459 03/02/22  1532 01/11/22  1425 07/21/21  1544 03/19/21  1431   HGB  --  12.9 14.7 13.8   < > 13.7   PLT  --  257 260  --   --  241   NA  --  136  --  135   < > 135   POTASSIUM  --  4.6  --  4.2   < > 4.4   CR 1.0 0.77  --  0.75   < > 0.87   A1C  --   --  5.9*  --   --  5.8*    < > = " values in this interval not displayed.        Diagnostics:  Labs pending at this time.  Results will be reviewed when available.  Recent Results (from the past 720 hour(s))   UA with Microscopic    Collection Time: 11/03/22  3:15 PM   Result Value Ref Range    Color Urine Light Yellow Colorless, Straw, Light Yellow, Yellow    Appearance Urine Clear Clear    Glucose Urine Negative Negative mg/dL    Bilirubin Urine Negative Negative    Ketones Urine Negative Negative mg/dL    Specific Gravity Urine 1.007 1.003 - 1.035    Blood Urine Negative Negative    pH Urine 6.0 5.0 - 7.0    Protein Albumin Urine Negative Negative mg/dL    Urobilinogen Urine Normal Normal, 2.0 mg/dL    Nitrite Urine Positive (A) Negative    Leukocyte Esterase Urine Trace (A) Negative    Bacteria Urine Few (A) None Seen /HPF    Amorphous Crystals Urine Few (A) None Seen /HPF    RBC Urine 1 <=2 /HPF    WBC Urine 3 <=5 /HPF   Urine Culture    Collection Time: 11/03/22  3:15 PM    Specimen: Urine, Catheter   Result Value Ref Range    Culture >100,000 CFU/mL Escherichia coli (A)     Culture >100,000 CFU/mL Enterococcus faecalis (A)        Susceptibility    Enterococcus faecalis - SEVERIANO     Penicillin 1 Susceptible ug/mL     Ampicillin <=2 Susceptible ug/mL     Vancomycin 1 Susceptible ug/mL     Nitrofurantoin <=16 Susceptible ug/mL    Escherichia coli - SEVERIANO     Ampicillin >=32 Resistant ug/mL     Ampicillin/ Sulbactam >=32 Resistant ug/mL     Piperacillin/Tazobactam <=4 Susceptible ug/mL     Cefazolin* <=4 Susceptible ug/mL      * Cefazolin SEVERIANO breakpoints are for the treatment of uncomplicated urinary tract infections. For the treatment of systemic infections, please contact the laboratory for additional testing.     Cefoxitin <=4 Susceptible ug/mL     Ceftazidime <=1 Susceptible ug/mL     Ceftriaxone <=1 Susceptible ug/mL     Cefepime <=1 Susceptible ug/mL     Gentamicin <=1 Susceptible ug/mL     Tobramycin <=1 Susceptible ug/mL     Ciprofloxacin  <=0.25 Susceptible ug/mL     Levofloxacin <=0.12 Susceptible ug/mL     Nitrofurantoin <=16 Susceptible ug/mL     Trimethoprim/Sulfamethoxazole <=1/19 Susceptible ug/mL   HEMOGLOBIN A1C    Collection Time: 11/15/22  3:40 PM   Result Value Ref Range    Hemoglobin A1C 5.8 (H) 0.0 - 5.6 %   CBC with platelets    Collection Time: 11/15/22  3:40 PM   Result Value Ref Range    WBC Count 7.2 4.0 - 11.0 10e3/uL    RBC Count 4.99 3.80 - 5.20 10e6/uL    Hemoglobin 12.8 11.7 - 15.7 g/dL    Hematocrit 40.8 35.0 - 47.0 %    MCV 82 78 - 100 fL    MCH 25.7 (L) 26.5 - 33.0 pg    MCHC 31.4 (L) 31.5 - 36.5 g/dL    RDW 14.1 10.0 - 15.0 %    Platelet Count 262 150 - 450 10e3/uL      No EKG required for low risk surgery (cataract, skin procedure, breast biopsy, etc).    Revised Cardiac Risk Index (RCRI):  The patient has the following serious cardiovascular risks for perioperative complications:   - Congestive Heart Failure (pulmonary edema, PND, s3 regina, CXR with pulmonary congestion, basilar rales) = 1 point     RCRI Interpretation: 1 point: Class II (low risk - 0.9% complication rate)           Signed Electronically by: Poornima Wall MD  Copy of this evaluation report is provided to requesting physician.      Answers for HPI/ROS submitted by the patient on 11/15/2022  If you checked off any problems, how difficult have these problems made it for you to do your work, take care of things at home, or get along with other people?: Somewhat difficult  PHQ9 TOTAL SCORE: 2  JEOVANNY 7 TOTAL SCORE: 6

## 2022-11-15 NOTE — PATIENT INSTRUCTIONS
As discussed , please do pertinent labs ordered .   Please hold Metformin on the day of surgery since you will not eat food on that day. Can resume after surgery once you start eating.     ===========================================================  Preparing for Your Surgery  Getting started  A nurse will call you to review your health history and instructions. They will give you an arrival time based on your scheduled surgery time. Please be ready to share:  Your doctor s clinic name and phone number  Your medical, surgical, and anesthesia history  A list of allergies and sensitivities  A list of medicines, including herbal treatments and over-the-counter drugs  Whether the patient has a legal guardian (ask how to send us the papers in advance)  Please tell us if you re pregnant--or if there s any chance you might be pregnant. Some surgeries may injure a fetus (unborn baby), so they require a pregnancy test. Surgeries that are safe for a fetus don t always need a test, and you can choose whether to have one.   If you have a child who s having surgery, please ask for a copy of Preparing for Your Child s Surgery.    Preparing for surgery  Within 10 to 30 days of surgery: Have a pre-op exam (sometimes called an H&P, or History and Physical). This can be done at a clinic or pre-operative center.  If you re having a , you may not need this exam. Talk to your care team.  At your pre-op exam, talk to your care team about all medicines you take. If you need to stop any medicines before surgery, ask when to start taking them again.  We do this for your safety. Many medicines can make you bleed too much during surgery. Some change how well surgery (anesthesia) drugs work.  Call your insurance company to let them know you re having surgery. (If you don t have insurance, call [phone].)  Call your clinic if there s any change in your health. This includes signs of a cold or flu (sore throat, runny nose, cough,  rash, fever). It also includes a scrape or scratch near the surgery site.  If you have questions on the day of surgery, call your hospital or surgery center.  COVID testing  You may need to be tested for COVID-19 before having surgery. If so, we will give you instructions (or click here).  Eating and drinking guidelines  For your safety: Unless your surgeon tells you otherwise, follow the guidelines below.  Eat and drink as usual until 8 hours before you arrive for surgery. After that, no food or milk.  Drink clear liquids until 2 hours before you arrive. These are liquids you can see through, like water, Gatorade, and Propel Water. They also include plain black coffee and tea (no cream or milk), candy, and breath mints. You can spit out gum when you arrive.  If you drink alcohol: Stop drinking it the night before surgery.  If your care team tells you to take medicine on the morning of surgery, it s okay to take it with a sip of water.  Preventing infection  Shower or bathe the night before and morning of your surgery. Follow the instructions your clinic gave you. (If no instructions, use regular soap.)  Don t shave or clip hair near your surgery site. We ll remove the hair if needed.  Don t smoke or vape the morning of surgery. You may chew nicotine gum up to 2 hours before surgery. A nicotine patch is okay.  Note: Some surgeries require you to completely quit smoking and nicotine. Check with your surgeon.  Your care team will make every effort to keep you safe from infection. We will:  Clean our hands often with soap and water (or an alcohol-based hand rub).  Clean the skin at your surgery site with a special soap that kills germs.  Give you a special gown to keep you warm. (Cold raises the risk of infection.)  Wear special hair covers, masks, gowns and gloves during surgery.  Give antibiotic medicine, if prescribed. Not all surgeries need antibiotics.  What to bring on the day of surgery  Photo ID and insurance  card  Copy of your health care directive, if you have one  Glasses and hearing aids (bring cases)  You can t wear contacts during surgery  Inhaler and eye drops, if you use them (tell us about these when you arrive)  CPAP machine or breathing device, if you use them  A few personal items, if spending the night  If you have . . .  A pacemaker, ICD (cardiac defibrillator) or other implant: Bring the ID card.  An implanted stimulator: Bring the remote control.  A legal guardian: Bring a copy of the certified (court-stamped) guardianship papers.  Please remove any jewelry, including body piercings. Leave jewelry and other valuables at home.  If you re going home the day of surgery  You must have a responsible adult drive you home. They should stay with you overnight as well.  If you don t have someone to stay with you, and you aren t safe to go home alone, we may keep you overnight. Insurance often won t pay for this.  After surgery  If it s hard to control your pain or you need more pain medicine, please call your surgeon s office.  Questions?   If you have any questions for your care team, list them here:   ____________________________________________________________________________________________________________________________________________________________________________________________________________________________________________________________________  For informational purposes only. Not to replace the advice of your health care provider. Copyright   2003, 2019 [facility]. All rights reserved. Clinically reviewed by Deanna Bertrand MD. [place] [id number] - REV 10/22.

## 2022-11-15 NOTE — H&P (VIEW-ONLY)
[address] [address]  Phone: [phone]  Primary Provider: Karen Santos  Pre-op Performing Provider: FRANCESCA SEXTON      PREOPERATIVE EVALUATION:  Today's date: 11/15/2022    Bhavani White is a 67 year old female who presents for a preoperative evaluation.    Surgical Information:  Surgery/Procedure:CYSTOSCOPY   [place] [facility] [place]  Surgeon: MD MARSH  Surgery Date: 11/18/2022  Time of Surgery: 2:25 PM  Where patient plans to recover: At home with family  Fax number for surgical facility: Note does not need to be faxed, will be available electronically in Epic.    Type of Anesthesia Anticipated: Local with MAC    Assessment & Plan     The proposed surgical procedure is considered LOW risk.    Assessment and Plan  1. Preoperative clearance  2. Neurogenic bladder, S/P Suprapubic Catheter   3. Other cystostomy status (H)  4. Paraplegia (H)  Pt  is here for Preop clearance of Neurogenic bladder S/P Suprapubic catheter and scheduled for CYSTOSCOPY, WITH BOTULINUM TOXIN INJECTION under MAC with local.  -  She does have risk factors of Paraplegia wheel chair bound , Ventral hernia , OA of cervical spine with myelopathy , ANIYA on CPAP, DMT2 with neuropathy, Asthma ; On Methadone. Last labs in 6/2022 normal BMP, CBC.  Last MRI abdomen on 8/29/22 showing 8 mm Rt renal lesion needing recheck in 6 months . Last Echo in 3/2020 positive for LVEF 60-65%. , Grade II or moderate diastolic dysfunction. , The right ventricle is moderately dilated. Last EKG in 10/2022 showing RBBB , ischemia which was seen also in the past.   - CBC with platelets; Future  - Comprehensive metabolic panel (BMP + Alb, Alk Phos, ALT, AST, Total. Bili, TP); Future  - CBC with platelets  - Comprehensive metabolic panel (BMP + Alb, Alk Phos, ALT, AST, Total. Bili, TP)      5. Type 2 diabetes mellitus with diabetic nephropathy, unspecified whether long term insulin use  (H)  Well-controlled on current A1c check due at this time at 5.8%.  Continue current metformin.  - HEMOGLOBIN A1C; Future  - HEMOGLOBIN A1C    6. Morbid obesity (H)  Risk factors including sleep apnea noncompliant with CPAP.    7. Chronic kidney disease, stage 2 (mild)  - Albumin Random Urine Quantitative with Creat Ratio; Future  - Albumin Random Urine Quantitative with Creat Ratio    8. Chronic diastolic (congestive) heart failure (H)  9. Stress-induced cardiomyopathy  Chronic stable condition, well controlled with no signs of CHF at this time.Recent Cardiology visit on 10/10/22 for Stress induced CM and Chronic diastolic CHF.   - Past Hx of hypertension, dyslipidemia and type 2 diabetes who was hospitalized in 12/2019 with a perforated duodenal ulcer.  She was in septic shock with respiratory failure and suffered a type 2 nontransmural myocardial infarction.  Her echocardiogram showed an ejection fraction of 30% with a global pattern of hypokinesis, most consistent with stress cardiomyopathy.  Prior to her discharge, the echo was repeated and showed an improved left ventricular ejection fraction to 60%.  A followup cardiac MRI in 05/2020 showed a normal ejection fraction of 67% with a small area of transmural infarction involving the distal inferior wall and inferior apex without any inducible myocardial ischemia using Lexiscan infusion.  Currently stable and asymptomatic.  - BETA BLOCKER NOT PRESCRIBED (INTENTIONAL); Please choose reason not prescribed from choices below.    10. Fibromyalgia, Chronic Pain -- On Methadone  Patient follows pain clinic for her chronic pain.- At [facility].      Patient Instructions     As discussed , please do pertinent labs ordered .   Please hold Metformin on the day of surgery since you will not eat food on that day. Can resume after surgery once you start eating.     ===========================================================  Preparing  for Your Surgery  Getting started  A nurse will call you to review your health history and instructions. They will give you an arrival time based on your scheduled surgery time. Please be ready to share:    Your doctor s clinic name and phone number    Your medical, surgical, and anesthesia history    A list of allergies and sensitivities    A list of medicines, including herbal treatments and over-the-counter drugs    Whether the patient has a legal guardian (ask how to send us the papers in advance)  Please tell us if you re pregnant--or if there s any chance you might be pregnant. Some surgeries may injure a fetus (unborn baby), so they require a pregnancy test. Surgeries that are safe for a fetus don t always need a test, and you can choose whether to have one.   If you have a child who s having surgery, please ask for a copy of Preparing for Your Child s Surgery.    Preparing for surgery  1. Within 10 to 30 days of surgery: Have a pre-op exam (sometimes called an H&P, or History and Physical). This can be done at a clinic or pre-operative center.  ? If you re having a , you may not need this exam. Talk to your care team.  2. At your pre-op exam, talk to your care team about all medicines you take. If you need to stop any medicines before surgery, ask when to start taking them again.  ? We do this for your safety. Many medicines can make you bleed too much during surgery. Some change how well surgery (anesthesia) drugs work.  3. Call your insurance company to let them know you re having surgery. (If you don t have insurance, call [phone].)  4. Call your clinic if there s any change in your health. This includes signs of a cold or flu (sore throat, runny nose, cough, rash, fever). It also includes a scrape or scratch near the surgery site.  5. If you have questions on the day of surgery, call your hospital or surgery center.  COVID testing  You may need to be tested for COVID-19 before having  surgery. If so, we will give you instructions (or click here).  Eating and drinking guidelines  For your safety: Unless your surgeon tells you otherwise, follow the guidelines below.    Eat and drink as usual until 8 hours before you arrive for surgery. After that, no food or milk.    Drink clear liquids until 2 hours before you arrive. These are liquids you can see through, like water, Gatorade, and Propel Water. They also include plain black coffee and tea (no cream or milk), candy, and breath mints. You can spit out gum when you arrive.    If you drink alcohol: Stop drinking it the night before surgery.    If your care team tells you to take medicine on the morning of surgery, it s okay to take it with a sip of water.  Preventing infection  1. Shower or bathe the night before and morning of your surgery. Follow the instructions your clinic gave you. (If no instructions, use regular soap.)  2. Don t shave or clip hair near your surgery site. We ll remove the hair if needed.  3. Don t smoke or vape the morning of surgery. You may chew nicotine gum up to 2 hours before surgery. A nicotine patch is okay.  ? Note: Some surgeries require you to completely quit smoking and nicotine. Check with your surgeon.  4. Your care team will make every effort to keep you safe from infection. We will:  ? Clean our hands often with soap and water (or an alcohol-based hand rub).  ? Clean the skin at your surgery site with a special soap that kills germs.  ? Give you a special gown to keep you warm. (Cold raises the risk of infection.)  ? Wear special hair covers, masks, gowns and gloves during surgery.  ? Give antibiotic medicine, if prescribed. Not all surgeries need antibiotics.  What to bring on the day of surgery  1. Photo ID and insurance card  2. Copy of your health care directive, if you have one  3. Glasses and hearing aids (bring cases)  ? You can t wear contacts during surgery  4. Inhaler and eye drops, if you use them  (tell us about these when you arrive)  5. CPAP machine or breathing device, if you use them  6. A few personal items, if spending the night  7. If you have . . .  ? A pacemaker, ICD (cardiac defibrillator) or other implant: Bring the ID card.  ? An implanted stimulator: Bring the remote control.  ? A legal guardian: Bring a copy of the certified (court-stamped) guardianship papers.  Please remove any jewelry, including body piercings. Leave jewelry and other valuables at home.  If you re going home the day of surgery    You must have a responsible adult drive you home. They should stay with you overnight as well.    If you don t have someone to stay with you, and you aren t safe to go home alone, we may keep you overnight. Insurance often won t pay for this.  After surgery  If it s hard to control your pain or you need more pain medicine, please call your surgeon s office.  Questions?   If you have any questions for your care team, list them here:   ____________________________________________________________________________________________________________________________________________________________________________________________________________________________________________________________________  For informational purposes only. Not to replace the advice of your health care provider. Copyright   2003, 2019 [facility]. All rights reserved. Clinically reviewed by Deanna Bertrand MD. [employer] [id number] - REV 10/22.      Return in about 4 weeks (around 12/13/2022), or if symptoms worsen or fail to improve, for If symptoms persist, Follow up of last visit Establish Care . .    Poornima Wall MD  [facility] [place]         Risks and Recommendations:  The patient has the following additional risks and recommendations for perioperative complications:   - Consult Hospitalist / IM to assist with post-op medical management   - Morbid obesity (BMI >40)  Cardiovascular:   - Stress induced  CM and Chronic diastolic CHF , reviewed recent Cardiology visit note which is stable.   Diabetes:  - Patient is not on insulin therapy: regular NPO guidelines can be followed.   Pulmonary:    - Incentive spirometry post-op   - Consider Respiratory Therapy (Respiratory Care IP Consult) post-op   - Does have Asthma on inhalers  Obstructive Sleep Apnea: Non Compliant with CPAP    Infection:    - Patient has a history of MRSA    Medication Instructions:  Patient is to take all scheduled medications on the day of surgery EXCEPT for modifications listed below:   - aspirin: Discontinue aspirin 7-10 days prior to procedure to reduce bleeding risk. It should be resumed postoperatively.    - ACE/ARB: May be continued on the day of surgery.    - Statins: Continue taking on the day of surgery.    - metformin: HOLD day of surgery.   - Antiepileptics: Continue without modification.  - Opiates : Continue Methadone at the same dose   - SSRIs, SNRIs, TCAs, Antipsychotics: Continue without modification.    - LABA, inhaled corticosteroid, long-acting anticholinergics: Continue without modification.    RECOMMENDATION:  APPROVAL GIVEN to proceed with proposed procedure, without further diagnostic evaluation.    Review of external notes as documented above     40 minutes spent on the date of the encounter doing chart review, review of outside records, review of test results, interpretation of tests, patient visit and documentation         Subjective     HPI related to upcoming procedure:     Pt is new to me follows ARJUN Ga as PCP. She is here for Preop clearance    Preop Questions 11/14/2022   1. Have you ever had a heart attack or stroke? YES    2. Have you ever had surgery on your heart or blood vessels, such as a stent placement, a coronary artery bypass, or surgery on an artery in your head, neck, heart, or legs? No   3. Do you have chest pain with activity? No   4. Do you have a history of  heart failure? YES    5. Do you  currently have a cold, bronchitis or symptoms of other infection? No   6. Do you have a cough, shortness of breath, or wheezing? No   7. Do you or anyone in your family have previous history of blood clots? No   8. Do you or does anyone in your family have a serious bleeding problem such as prolonged bleeding following surgeries or cuts? No   9. Have you ever had problems with anemia or been told to take iron pills? No   10. Have you had any abnormal blood loss such as black, tarry or bloody stools, or abnormal vaginal bleeding? No   11. Have you ever had a blood transfusion? No   11a. Have you ever had a transfusion reaction? -   12. Are you willing to have a blood transfusion if it is medically needed before, during, or after your surgery? Yes   13. Have you or any of your relatives ever had problems with anesthesia? No   14. Do you have sleep apnea, excessive snoring or daytime drowsiness? YES    14a. Do you have a CPAP machine? No   15. Do you have any artifical heart valves or other implanted medical devices like a pacemaker, defibrillator, or continuous glucose monitor? No   16. Do you have artificial joints? YES    17. Are you allergic to latex? No   18. Is there any chance that you may be pregnant? -       Health Care Directive:  Patient has a Health Care Directive on file      Preoperative Review of :   reviewed - controlled substances prescribed by other outside provider(s).    Status of Chronic Conditions:  See problem list for active medical problems.  Problems all longstanding and stable, except as noted/documented.  See ROS for pertinent symptoms related to these conditions.      Review of Systems  CONSTITUTIONAL: NEGATIVE for fever, chills, change in weight  INTEGUMENTARY/SKIN: NEGATIVE for worrisome rashes, moles or lesions  EYES: NEGATIVE for vision changes or irritation  ENT/MOUTH: NEGATIVE for ear, mouth and throat problems  RESP: NEGATIVE for significant cough or SOB  CV: NEGATIVE for chest  pain, palpitations or peripheral edema  GI: NEGATIVE for nausea, abdominal pain, heartburn, or change in bowel habits  : NEGATIVE for frequency, dysuria, or hematuria  MUSCULOSKELETAL: NEGATIVE for significant arthralgias or myalgia  NEURO: NEGATIVE for weakness, dizziness or paresthesias  ENDOCRINE: NEGATIVE for temperature intolerance, skin/hair changes  HEME: NEGATIVE for bleeding problems  PSYCHIATRIC: NEGATIVE for changes in mood or affect    Patient Active Problem List    Diagnosis Date Noted     Stress-induced cardiomyopathy 11/15/2022     Priority: Medium     Recurrent major depressive disorder, in full remission (H) 03/02/2022     Priority: Medium     Chronic kidney disease, stage 2 (mild) 11/29/2021     Priority: Medium     Paraplegia (H) 04/30/2021     Priority: Medium     Chronic diastolic (congestive) heart failure (H) 03/19/2021     Priority: Medium     ANIYA -- noncompliant with CPAP 01/18/2020     Priority: Medium     Fusion of spine of cervical region 01/07/2020     Priority: Medium     NSTEMI (non-ST elevated myocardial infarction) (H) 12/30/2019     Priority: Medium     Osteoarthritis of cervical spine with myelopathy 03/04/2016     Priority: Medium     DISH (diffuse idiopathic skeletal hyperostosis) 03/04/2016     Priority: Medium     Morbid obesity (H) 10/17/2015     Priority: Medium     Overview:   Body mass index is 47.93 kg/(m^^2) .       Wheelchair bound 08/15/2015     Priority: Medium     Neurogenic bladder, S/P Suprapubic Catheter  03/19/2015     Priority: Medium     Ventral hernia 03/19/2015     Priority: Medium     Recurrent UTI (urinary tract infection) 10/24/2014     Priority: Medium     Controlled substance agreement signed 10/24/2014 10/24/2014     Priority: Medium     She had a controlled substance agreement with Ekta Gaffney until seen by pain clinic. Now followed by pain clinic; see chronic pain       Pain medication agreement 10/25/2013     Priority: Medium     Formatting of this  note might be different from the original.  Violated contract 2014- see encounter 10/1/14 Gabriela Suero M.D.   10/1/2014  9:30 AM       Other cystostomy status (H) 2012     Priority: Medium     Overview:   MRSA + urine 12       MRSA (methicillin resistant staph aureus) culture positive 2012     Priority: Medium     Formatting of this note might be different from the original.  S/P catheter- + MRSA culture 12- follow up urine culture 10/12=e.coli, no staph mentioned.       Hyperlipidemia LDL goal <100 2011     Priority: Medium     Neuropathy 2011     Priority: Medium     Asthma 02/15/2011     Priority: Medium     Lumbar spinal stenosis 2011     Priority: Medium     Fibromyalgia, Chronic Pain -- On Methadone 2011     Priority: Medium     Osteoarthritis 2011     Priority: Medium     Hypertension goal BP (blood pressure) < 140/90 2011     Priority: Medium     Health Care Home 2011     Priority: Medium       Status:  No active care coordination   Care Coordinator:  Wendy Pearson [phone]  See Letters for [facility] Care Plan  Date:  May 23, 2016           Hypothyroidism due to acquired atrophy of thyroid 2010     Priority: Medium     DM type 2, w Neuropathy -- Hgb A1C 7.1 on 1/7/20 10/31/2010     Priority: Medium     Chronic low back pain 2009     Priority: Medium     1981       Moderate major depression (H) 2009     Priority: Medium     Anxiety 2009     Priority: Medium      Past Medical History:   Diagnosis Date     Abdominal wall cellulitis 2020     Abdominopelvic abscess (H) 2020     Anxiety      Asthma      Depression      DM (diabetes mellitus) (H)     with peripheral neuropathy     Fracture of lower extremity 2013     Frequent UTI      History of blood transfusion      History of ulcer disease 2014    She notes from NSAIDs; nearly . Discussed a hospitalization at [facility] for this.      Hypertension      Hypothyroid      Iatrogenic Cushing's disease (H)     off prednisone now     Infection due to alpha-hemolytic Streptococcus 02/01/2020     Morbid obesity (H)      MRSA (methicillin resistant staph aureus) culture positive 05/24/2012    repeat cx 10/12/2012 no staph mentioned.     ANIYA -- noncompliant with CPAP      Osteoarthritis     multiple joings.     Other chronic pain      Perforated duodenal ulcer -- S/P Surgery 1/1/2020 12/29/2019     SBO (small bowel obstruction) (H) 06/03/2020     Sepsis (WBC 20K, ) 03/16/2020     Sleep apnea     No longer uses cpap.     Past Surgical History:   Procedure Laterality Date     ABDOMEN SURGERY       APPLY WOUND VAC  12/30/2019    Procedure: Apply Wound Vac;  Surgeon: Ayan Lopez MD;  Location: [facility] OR     CLOSE SECONDARY WOUND ABDOMEN N/A 1/1/2020    Procedure: SECONDARY ABDOMINAL WOUND CLOSURE;  Surgeon: Ayan Lopez MD;  Location: [facility] OR     CYSTOSCOPY N/A 9/21/2018    Procedure: CYSTOSCOPY;  Cystoscopy and Botox Injection Into the Bladder and suprapubic tube exchange;  Surgeon: Yoandy Rios MD;  [facility]     CYSTOSCOPY N/A 3/15/2019    Procedure: Cystoscopy, suprapubic tube exchange;  Surgeon: Yoandy Rios MD;  [facility]     CYSTOSCOPY FLEXIBLE, CYSTOSTOMY, INSERT TUBE SUPRAPUBIC, COMBINED N/A 11/1/2019    Procedure: Cystoscopy, Bladder Botox Injection, Suprapubic Tube Change;  Surgeon: Yoandy Rios MD;  [facility]     CYSTOSCOPY, INTRAVESICAL INJECTION N/A 8/23/2017    Procedure: CYSTOSCOPY, INTRAVESICAL INJECTION;  Cystoscopy, Botox Injection Into The Bladder  Latex Allergy ;  Surgeon: Yoandy Rios MD;  [facility] OR     CYSTOSCOPY, INTRAVESICAL INJECTION N/A 3/8/2018    Procedure: CYSTOSCOPY, INTRAVESICAL INJECTION;  Cystoscopy, Botox Injection Into The Bladder and suprapubic catheter exchange;  Surgeon: Yoandy Rios MD;  [facility]     DISCECTOMY, FUSION  CERVICAL ANTERIOR THREE+ LEVELS, COMBINED Left 5/3/2016    Procedure: COMBINED DISCECTOMY, FUSION CERVICAL ANTERIOR THREE+ LEVELS;  Surgeon: Jasvir Torres MD;  [facility]: [facility]     ENTEROSCOPY SMALL BOWEL N/A 6/8/2020    Procedure: Enteroscopy small bowel;  Surgeon: Gabe Forte MD;  Location:  GI     GASTROSTOMY, INSERT TUBE, COMBINED N/A 1/1/2020    Procedure: GASTRIC-JEJUNAL FEEDING TUBE INSERTION;  Surgeon: Ayan Lopez MD;  Location:  OR     GENITOURINARY SURGERY      suprapubic catheter     HERNIA REPAIR  1957    double hernia     HYSTERECTOMY, PAP NO LONGER INDICATED      CELIA and large ovarian tumor removed     INJECT BOTOX N/A 9/21/2018    Procedure: INJECT BOTOX;;  Surgeon: Yoandy Rios MD;  [facility]     INJECT BOTOX N/A 3/15/2019    Procedure: Inject Botox into Bladder;  Surgeon: Yoandy Rios MD;  [facility]     IR GASTRO JEJUNOSTOMY TUBE PLACEMENT  6/11/2020     IR GASTROSTOMY TUBE CHANGE  6/30/2020     IR JEJUNOSTOMY TUBE CHANGE  2/4/2020     IR JEJUNOSTOMY TUBE CHANGE  2/19/2020     LAPAROSCOPY DIAGNOSTIC (GENERAL) N/A 12/30/2019    Procedure: Laparoscopy diagnostic (general);  Surgeon: Ayan Lopez MD;  Location:  OR     LAPAROTOMY EXPLORATORY N/A 1/1/2020    Procedure: EXPLORATORY LAPAROTOMY;  Surgeon: Ayan Lopez MD;  Location:  OR     LAPAROTOMY EXPLORATORY N/A 12/30/2019    Procedure: LAPAROTOMY, REPAIR OF ULCER PERFORATION;  Surgeon: Ayan Lopez MD;  Location:  OR     SURGICAL HISTORY OF -       left cataract surgery     SURGICAL HISTORY OF -   12/14    right cataract surgery and left laser revision     SURGICAL HISTORY OF -   March 2015    left carpal tunnel release     TONSILLECTOMY  1974     Z LAMINECTOMY,FACETECTOMY,LUMBAR  1982     Z TOTAL KNEE ARTHROPLASTY  1999    left     Current Outpatient Medications   Medication Sig Dispense Refill     albuterol (PROVENTIL) (2.5 MG/3ML) 0.083% neb  solution USE 1 VIAL VIA NEBULIZATION EVERY 4 HOURS AS NEEDED 75 mL 1     aspirin 81 MG EC tablet Take 81 mg by mouth daily       BETA BLOCKER NOT PRESCRIBED (INTENTIONAL) Please choose reason not prescribed from choices below.       bisacodyl (DULCOLAX) 10 MG suppository Place 10 mg rectally 2 times daily as needed for constipation       desonide (DESOWEN) 0.05 % external ointment Apply topically 2 times daily 60 g 1     DULoxetine (CYMBALTA) 60 MG capsule Take 120 mg by mouth every morning       fluticasone-vilanterol (BREO ELLIPTA) 100-25 MCG/INH inhaler Inhale 1 puff into the lungs daily 28 each 5     gabapentin (NEURONTIN) 800 MG tablet Take 1,600 mg by mouth 3 times daily 0800, 1200 and 1800       hydrOXYzine (ATARAX) 50 MG tablet 50 mg 2 times daily as needed        hypromellose (ARTIFICIAL TEARS) 0.5 % SOLN ophthalmic solution 1 drop every hour as needed       ipratropium - albuterol 0.5 mg/2.5 mg/3 mL (DUONEB) 0.5-2.5 (3) MG/3ML neb solution Take 1 vial by nebulization every 6 hours as needed for shortness of breath / dyspnea or wheezing       ketoconazole (NIZORAL) 2 % external shampoo        levothyroxine (SYNTHROID/LEVOTHROID) 88 MCG tablet Take 1 tablet (88 mcg) by mouth daily 90 tablet 3     lisinopril (ZESTRIL) 2.5 MG tablet Take 1 tablet (2.5 mg) by mouth daily 90 tablet 3     metFORMIN (GLUCOPHAGE-XR) 500 MG 24 hr tablet TAKE 1 TABLET(500 MG) BY MOUTH TWICE DAILY WITH MEALS 180 tablet 3     methadone (DOLOPHINE) 10 MG tablet Take 2 tablets (20 mg) by mouth 2 times daily 20 tablet 0     miconazole (MICATIN/MICRO GUARD) 2 % external powder Apply topically 2 times daily as needed for other (topical candidiasis)  0     multivitamin, therapeutic (THERA-VIT) TABS tablet Take 1 tablet by mouth daily       nitroFURantoin macrocrystal-monohydrate (MACROBID) 100 MG capsule Take 1 capsule (100 mg) by mouth 2 times daily for 5 days 10 capsule 0     ondansetron (ZOFRAN-ODT) 4 MG ODT tab Take 1 tablet (4 mg) by  "mouth every 6 hours as needed for nausea or vomiting       oxybutynin ER (DITROPAN XL) 15 MG 24 hr tablet Take 1 tablet (15 mg) by mouth daily 30 tablet 11     polyethylene glycol (MIRALAX/GLYCOLAX) packet Take 17 g by mouth 2 times daily as needed (constipation)  0     rosuvastatin (CRESTOR) 20 MG tablet TAKE 1 TABLET(5 MG) BY MOUTH AT BEDTIME 90 tablet 3     senna-docusate (SENOKOT-S/PERICOLACE) 8.6-50 MG tablet Take 2 tablets by mouth 2 times daily as needed for constipation 100 tablet 0     tiZANidine (ZANAFLEX) 4 MG tablet Take 4 mg by mouth 3 times daily as needed for muscle spasms        traZODone (DESYREL) 100 MG tablet Take 150 mg by mouth At Bedtime       VENTOLIN  (90 Base) MCG/ACT inhaler INHALE 2 PUFFS INTO THE LUNGS EVERY 6 HOURS AS NEEDED FOR SHORTNESS OF BREATH OR DIFFICULT BREATHING 18 g 1     amoxicillin (AMOXIL) 500 MG capsule TAKE 1 CAPSULE BY MOUTH THREE TIMES DAILY WITH MEALS FOR 14 DAYS       baclofen (LIORESAL) 10 MG tablet TAKE 1/2 TO 1 TABLET BY MOUTH TWICE DAILY AS NEEDED FOR MUSCLE SPASMS       chlorhexidine (PERIDEX) 0.12 % solution START 24 HOURS FOLLOWING SURGERY GENTLY SWISH WITH 1 CAPFUL FOR 60 SECONDS THEN SPIT OUT THREE TIMES DAILY FOR 10 DAYS       naloxone (NARCAN) 4 MG/0.1ML nasal spray CALL 911. SPR CONTENTS OF ONE SPRAYER (0.1ML) INTO ONE NOSTRIL. REPEAT IN 2-3 MIN IF SYMPTOMS OF OPIOID EMERGENCY PERSIST, ALTERNATE NOSTRILS       traZODone (DESYREL) 150 MG tablet Take 150 mg by mouth At Bedtime         Allergies   Allergen Reactions     Povidone Iodine      \"mild skin irritation\" per patient report     Toradol [Ketorolac] Other (See Comments)     Pt gets nightmares     Tramadol Other (See Comments)        Social History     Tobacco Use     Smoking status: Never     Smokeless tobacco: Never   Substance Use Topics     Alcohol use: No     Family History   Problem Relation Age of Onset     Hypertension Mother      Heart Disease Mother         bypass     Depression Mother  " "    Hypertension Father      Connective Tissue Disorder Father         ankylosing spondylitis     Cancer Father         colon; prostate     Other Cancer Father 89        bladder cancer     Depression Sister      Depression Son      History   Drug Use     Types: Marijuana     Comment: Medical canibis, methadone for chronic back pain         Objective     /82   Pulse 78   Temp 98.6  F (37  C) (Temporal)   Ht 1.651 m (5' 5\")   Wt 109.8 kg (242 lb)   SpO2 94%   BMI 40.27 kg/m      Physical Exam    GENERAL APPEARANCE: healthy, alert and no distress     EYES: EOMI, PERRL     HENT: ear canals and TM's normal and nose and mouth without ulcers or lesions     NECK: no adenopathy, no asymmetry, masses, or scars and thyroid normal to palpation     RESP: lungs clear to auscultation - no rales, rhonchi or wheezes     CV: regular rates and rhythm, normal S1 S2, no S3 or S4 and no murmur, click or rub     ABDOMEN:  soft, nontender, no HSM or masses and bowel sounds normal. Positive for irregular healing scars on the skin with previous abiominal surgeries history , ventral hernia and intact Suprapubic catheter in the LLQ below the abdominal skin folds, no signs of infection around it.     MS: extremities normal- no gross deformities noted, no evidence of inflammation in joints, FROM in all extremities.     SKIN: no suspicious lesions or rashes     NEURO: Normal strength in upper extremities , Lower extremities paraplegic , sensory exam grossly normal, mentation intact and speech normal     PSYCH: mentation appears normal. and affect normal/bright     LYMPHATICS: No cervical adenopathy    Recent Labs   Lab Test 08/17/22  1345 06/30/22  1459 03/02/22  1532 01/11/22  1425 07/21/21  1544 03/19/21  1431   HGB  --  12.9 14.7 13.8   < > 13.7   PLT  --  257 260  --   --  241   NA  --  136  --  135   < > 135   POTASSIUM  --  4.6  --  4.2   < > 4.4   CR 1.0 0.77  --  0.75   < > 0.87   A1C  --   --  5.9*  --   --  5.8*    < > = " values in this interval not displayed.        Diagnostics:  Labs pending at this time.  Results will be reviewed when available.  Recent Results (from the past 720 hour(s))   UA with Microscopic    Collection Time: 11/03/22  3:15 PM   Result Value Ref Range    Color Urine Light Yellow Colorless, Straw, Light Yellow, Yellow    Appearance Urine Clear Clear    Glucose Urine Negative Negative mg/dL    Bilirubin Urine Negative Negative    Ketones Urine Negative Negative mg/dL    Specific Gravity Urine 1.007 1.003 - 1.035    Blood Urine Negative Negative    pH Urine 6.0 5.0 - 7.0    Protein Albumin Urine Negative Negative mg/dL    Urobilinogen Urine Normal Normal, 2.0 mg/dL    Nitrite Urine Positive (A) Negative    Leukocyte Esterase Urine Trace (A) Negative    Bacteria Urine Few (A) None Seen /HPF    Amorphous Crystals Urine Few (A) None Seen /HPF    RBC Urine 1 <=2 /HPF    WBC Urine 3 <=5 /HPF   Urine Culture    Collection Time: 11/03/22  3:15 PM    Specimen: Urine, Catheter   Result Value Ref Range    Culture >100,000 CFU/mL Escherichia coli (A)     Culture >100,000 CFU/mL Enterococcus faecalis (A)        Susceptibility    Enterococcus faecalis - SEVERIANO     Penicillin 1 Susceptible ug/mL     Ampicillin <=2 Susceptible ug/mL     Vancomycin 1 Susceptible ug/mL     Nitrofurantoin <=16 Susceptible ug/mL    Escherichia coli - SEVERIANO     Ampicillin >=32 Resistant ug/mL     Ampicillin/ Sulbactam >=32 Resistant ug/mL     Piperacillin/Tazobactam <=4 Susceptible ug/mL     Cefazolin* <=4 Susceptible ug/mL      * Cefazolin SEVERIANO breakpoints are for the treatment of uncomplicated urinary tract infections. For the treatment of systemic infections, please contact the laboratory for additional testing.     Cefoxitin <=4 Susceptible ug/mL     Ceftazidime <=1 Susceptible ug/mL     Ceftriaxone <=1 Susceptible ug/mL     Cefepime <=1 Susceptible ug/mL     Gentamicin <=1 Susceptible ug/mL     Tobramycin <=1 Susceptible ug/mL     Ciprofloxacin  <=0.25 Susceptible ug/mL     Levofloxacin <=0.12 Susceptible ug/mL     Nitrofurantoin <=16 Susceptible ug/mL     Trimethoprim/Sulfamethoxazole <=1/19 Susceptible ug/mL   HEMOGLOBIN A1C    Collection Time: 11/15/22  3:40 PM   Result Value Ref Range    Hemoglobin A1C 5.8 (H) 0.0 - 5.6 %   CBC with platelets    Collection Time: 11/15/22  3:40 PM   Result Value Ref Range    WBC Count 7.2 4.0 - 11.0 10e3/uL    RBC Count 4.99 3.80 - 5.20 10e6/uL    Hemoglobin 12.8 11.7 - 15.7 g/dL    Hematocrit 40.8 35.0 - 47.0 %    MCV 82 78 - 100 fL    MCH 25.7 (L) 26.5 - 33.0 pg    MCHC 31.4 (L) 31.5 - 36.5 g/dL    RDW 14.1 10.0 - 15.0 %    Platelet Count 262 150 - 450 10e3/uL      No EKG required for low risk surgery (cataract, skin procedure, breast biopsy, etc).    Revised Cardiac Risk Index (RCRI):  The patient has the following serious cardiovascular risks for perioperative complications:   - Congestive Heart Failure (pulmonary edema, PND, s3 regina, CXR with pulmonary congestion, basilar rales) = 1 point     RCRI Interpretation: 1 point: Class II (low risk - 0.9% complication rate)           Signed Electronically by: Poornima Wall MD  Copy of this evaluation report is provided to requesting physician.      Answers for HPI/ROS submitted by the patient on 11/15/2022  If you checked off any problems, how difficult have these problems made it for you to do your work, take care of things at home, or get along with other people?: Somewhat difficult  PHQ9 TOTAL SCORE: 2  JEOVANNY 7 TOTAL SCORE: 6

## 2022-11-16 ENCOUNTER — TELEPHONE (OUTPATIENT)
Dept: FAMILY MEDICINE | Facility: CLINIC | Age: 67
End: 2022-11-16

## 2022-11-16 NOTE — TELEPHONE ENCOUNTER
Received 8 page fax for Home Health Certification and Plan of Care for Dr Nguyễn to complete. Form in the in-basket at GERMAN's desk.

## 2022-11-17 ENCOUNTER — ANESTHESIA EVENT (OUTPATIENT)
Dept: SURGERY | Facility: AMBULATORY SURGERY CENTER | Age: 67
End: 2022-11-17
Payer: MEDICARE

## 2022-11-18 ENCOUNTER — ANESTHESIA (OUTPATIENT)
Dept: SURGERY | Facility: AMBULATORY SURGERY CENTER | Age: 67
End: 2022-11-18
Payer: MEDICARE

## 2022-11-18 ENCOUNTER — HOSPITAL ENCOUNTER (OUTPATIENT)
Facility: AMBULATORY SURGERY CENTER | Age: 67
Discharge: HOME | End: 2022-11-18
Attending: UROLOGY
Payer: MEDICARE

## 2022-11-18 VITALS
RESPIRATION RATE: 14 BRPM | TEMPERATURE: 97.5 F | DIASTOLIC BLOOD PRESSURE: 66 MMHG | WEIGHT: 241.84 LBS | OXYGEN SATURATION: 94 % | HEIGHT: 65 IN | BODY MASS INDEX: 40.29 KG/M2 | SYSTOLIC BLOOD PRESSURE: 120 MMHG | HEART RATE: 66 BPM

## 2022-11-18 DIAGNOSIS — N31.9 NEUROGENIC BLADDER: ICD-10-CM

## 2022-11-18 PROCEDURE — 52287 CYSTOSCOPY CHEMODENERVATION: CPT | Mod: GC | Performed by: UROLOGY

## 2022-11-18 PROCEDURE — 52287 CYSTOSCOPY CHEMODENERVATION: CPT

## 2022-11-18 RX ORDER — SODIUM CHLORIDE, SODIUM LACTATE, POTASSIUM CHLORIDE, CALCIUM CHLORIDE 600; 310; 30; 20 MG/100ML; MG/100ML; MG/100ML; MG/100ML
INJECTION, SOLUTION INTRAVENOUS CONTINUOUS
Status: DISCONTINUED | OUTPATIENT
Start: 2022-11-18 | End: 2022-11-19 | Stop reason: HOSPADM

## 2022-11-18 RX ORDER — OXYCODONE HYDROCHLORIDE 5 MG/1
5 TABLET ORAL EVERY 4 HOURS PRN
Status: DISCONTINUED | OUTPATIENT
Start: 2022-11-18 | End: 2022-11-19 | Stop reason: HOSPADM

## 2022-11-18 RX ORDER — MEPERIDINE HYDROCHLORIDE 25 MG/ML
12.5 INJECTION INTRAMUSCULAR; INTRAVENOUS; SUBCUTANEOUS
Status: DISCONTINUED | OUTPATIENT
Start: 2022-11-18 | End: 2022-11-19 | Stop reason: HOSPADM

## 2022-11-18 RX ORDER — ONDANSETRON 4 MG/1
4 TABLET, ORALLY DISINTEGRATING ORAL EVERY 30 MIN PRN
Status: DISCONTINUED | OUTPATIENT
Start: 2022-11-18 | End: 2022-11-19 | Stop reason: HOSPADM

## 2022-11-18 RX ORDER — FENTANYL CITRATE 50 UG/ML
50 INJECTION, SOLUTION INTRAMUSCULAR; INTRAVENOUS EVERY 5 MIN PRN
Status: DISCONTINUED | OUTPATIENT
Start: 2022-11-18 | End: 2022-11-18 | Stop reason: HOSPADM

## 2022-11-18 RX ORDER — ONDANSETRON 2 MG/ML
4 INJECTION INTRAMUSCULAR; INTRAVENOUS EVERY 30 MIN PRN
Status: DISCONTINUED | OUTPATIENT
Start: 2022-11-18 | End: 2022-11-19 | Stop reason: HOSPADM

## 2022-11-18 RX ORDER — FENTANYL CITRATE 50 UG/ML
50 INJECTION, SOLUTION INTRAMUSCULAR; INTRAVENOUS EVERY 5 MIN PRN
Status: DISCONTINUED | OUTPATIENT
Start: 2022-11-18 | End: 2022-11-19 | Stop reason: HOSPADM

## 2022-11-18 RX ORDER — OXYCODONE HYDROCHLORIDE 5 MG/1
5 TABLET ORAL ONCE
Status: COMPLETED | OUTPATIENT
Start: 2022-11-18 | End: 2022-11-18

## 2022-11-18 RX ORDER — LIDOCAINE 40 MG/G
CREAM TOPICAL
Status: DISCONTINUED | OUTPATIENT
Start: 2022-11-18 | End: 2022-11-18 | Stop reason: HOSPADM

## 2022-11-18 RX ORDER — FENTANYL CITRATE 50 UG/ML
25 INJECTION, SOLUTION INTRAMUSCULAR; INTRAVENOUS EVERY 5 MIN PRN
Status: DISCONTINUED | OUTPATIENT
Start: 2022-11-18 | End: 2022-11-18 | Stop reason: HOSPADM

## 2022-11-18 RX ORDER — OXYCODONE HYDROCHLORIDE 5 MG/1
10 TABLET ORAL EVERY 4 HOURS PRN
Status: DISCONTINUED | OUTPATIENT
Start: 2022-11-18 | End: 2022-11-19 | Stop reason: HOSPADM

## 2022-11-18 RX ORDER — ACETAMINOPHEN 325 MG/1
975 TABLET ORAL ONCE
Status: DISCONTINUED | OUTPATIENT
Start: 2022-11-18 | End: 2022-11-18 | Stop reason: HOSPADM

## 2022-11-18 RX ORDER — FENTANYL CITRATE 50 UG/ML
25 INJECTION, SOLUTION INTRAMUSCULAR; INTRAVENOUS
Status: DISCONTINUED | OUTPATIENT
Start: 2022-11-18 | End: 2022-11-19 | Stop reason: HOSPADM

## 2022-11-18 RX ORDER — HYDROMORPHONE HYDROCHLORIDE 1 MG/ML
0.2 INJECTION, SOLUTION INTRAMUSCULAR; INTRAVENOUS; SUBCUTANEOUS EVERY 5 MIN PRN
Status: DISCONTINUED | OUTPATIENT
Start: 2022-11-18 | End: 2022-11-19 | Stop reason: HOSPADM

## 2022-11-18 RX ORDER — SODIUM CHLORIDE, SODIUM LACTATE, POTASSIUM CHLORIDE, CALCIUM CHLORIDE 600; 310; 30; 20 MG/100ML; MG/100ML; MG/100ML; MG/100ML
INJECTION, SOLUTION INTRAVENOUS CONTINUOUS
Status: DISCONTINUED | OUTPATIENT
Start: 2022-11-18 | End: 2022-11-18 | Stop reason: HOSPADM

## 2022-11-18 RX ORDER — FENTANYL CITRATE 50 UG/ML
25 INJECTION, SOLUTION INTRAMUSCULAR; INTRAVENOUS EVERY 5 MIN PRN
Status: DISCONTINUED | OUTPATIENT
Start: 2022-11-18 | End: 2022-11-19 | Stop reason: HOSPADM

## 2022-11-18 RX ORDER — HYDROMORPHONE HYDROCHLORIDE 1 MG/ML
0.4 INJECTION, SOLUTION INTRAMUSCULAR; INTRAVENOUS; SUBCUTANEOUS EVERY 5 MIN PRN
Status: DISCONTINUED | OUTPATIENT
Start: 2022-11-18 | End: 2022-11-19 | Stop reason: HOSPADM

## 2022-11-18 RX ORDER — LIDOCAINE HYDROCHLORIDE 20 MG/ML
INJECTION, SOLUTION INFILTRATION; PERINEURAL PRN
Status: DISCONTINUED | OUTPATIENT
Start: 2022-11-18 | End: 2022-11-18

## 2022-11-18 RX ORDER — PROPOFOL 10 MG/ML
INJECTION, EMULSION INTRAVENOUS PRN
Status: DISCONTINUED | OUTPATIENT
Start: 2022-11-18 | End: 2022-11-18

## 2022-11-18 RX ORDER — PROPOFOL 10 MG/ML
INJECTION, EMULSION INTRAVENOUS CONTINUOUS PRN
Status: DISCONTINUED | OUTPATIENT
Start: 2022-11-18 | End: 2022-11-18

## 2022-11-18 RX ADMIN — PROPOFOL 200 MCG/KG/MIN: 10 INJECTION, EMULSION INTRAVENOUS at 15:31

## 2022-11-18 RX ADMIN — OXYCODONE HYDROCHLORIDE 5 MG: 5 TABLET ORAL at 16:29

## 2022-11-18 RX ADMIN — LIDOCAINE HYDROCHLORIDE 40 MG: 20 INJECTION, SOLUTION INFILTRATION; PERINEURAL at 15:29

## 2022-11-18 RX ADMIN — PROPOFOL 30 MG: 10 INJECTION, EMULSION INTRAVENOUS at 15:29

## 2022-11-18 RX ADMIN — SODIUM CHLORIDE, SODIUM LACTATE, POTASSIUM CHLORIDE, CALCIUM CHLORIDE: 600; 310; 30; 20 INJECTION, SOLUTION INTRAVENOUS at 14:19

## 2022-11-18 NOTE — ANESTHESIA PREPROCEDURE EVALUATION
Anesthesia Pre-Procedure Evaluation    Patient: Bhavani White   MRN: [id number] : 1955        Procedure : Procedure(s):  CYSTOSCOPY, WITH BOTULINUM TOXIN INJECTION          Past Medical History:   Diagnosis Date     Abdominal wall cellulitis 2020     Abdominopelvic abscess (H) 2020     Anxiety      Asthma      Depression      DM (diabetes mellitus) (H)     with peripheral neuropathy     Fracture of lower extremity 2013     Frequent UTI      History of blood transfusion      History of ulcer disease 2014    She notes from NSAIDs; nearly . Discussed a hospitalization at [place] for this.     Hypertension      Hypothyroid      Iatrogenic Cushing's disease (H)     off prednisone now     Infection due to alpha-hemolytic Streptococcus 2020     Morbid obesity (H)      MRSA (methicillin resistant staph aureus) culture positive 2012    repeat cx 10/12/2012 no staph mentioned.     ANIYA -- noncompliant with CPAP      Osteoarthritis     multiple joings.     Other chronic pain      Perforated duodenal ulcer -- S/P Surgery 2019     SBO (small bowel obstruction) (H) 2020     Sepsis (WBC 20K, ) 2020     Sleep apnea     No longer uses cpap.      Past Surgical History:   Procedure Laterality Date     ABDOMEN SURGERY       APPLY WOUND VAC  2019    Procedure: Apply Wound Vac;  Surgeon: Ayan Lopez MD;  Location:  OR     CLOSE SECONDARY WOUND ABDOMEN N/A 2020    Procedure: SECONDARY ABDOMINAL WOUND CLOSURE;  Surgeon: Ayan Lopez MD;  Location: [facility] OR     CYSTOSCOPY N/A 2018    Procedure: CYSTOSCOPY;  Cystoscopy and Botox Injection Into the Bladder and suprapubic tube exchange;  Surgeon: Yoandy Rios MD;  [facility]     CYSTOSCOPY N/A 3/15/2019    Procedure: Cystoscopy, suprapubic tube exchange;  Surgeon: Yoandy Rios MD;  [facility]     CYSTOSCOPY FLEXIBLE, CYSTOSTOMY, INSERT TUBE  SUPRAPUBIC, COMBINED N/A 11/1/2019    Procedure: Cystoscopy, Bladder Botox Injection, Suprapubic Tube Change;  Surgeon: Yoandy Rios MD;  [facility]     CYSTOSCOPY, INTRAVESICAL INJECTION N/A 8/23/2017    Procedure: CYSTOSCOPY, INTRAVESICAL INJECTION;  Cystoscopy, Botox Injection Into The Bladder  Latex Allergy ;  Surgeon: Yoandy Rios MD;  [facility]     CYSTOSCOPY, INTRAVESICAL INJECTION N/A 3/8/2018    Procedure: CYSTOSCOPY, INTRAVESICAL INJECTION;  Cystoscopy, Botox Injection Into The Bladder and suprapubic catheter exchange;  Surgeon: Yoandy Rios MD;  [facility]     DISCECTOMY, FUSION CERVICAL ANTERIOR THREE+ LEVELS, COMBINED Left 5/3/2016    Procedure: COMBINED DISCECTOMY, FUSION CERVICAL ANTERIOR THREE+ LEVELS;  Surgeon: Jasvir Torres MD;  [facility]     ENTEROSCOPY SMALL BOWEL N/A 6/8/2020    Procedure: Enteroscopy small bowel;  Surgeon: Gabe Forte MD;  Location:  GI     GASTROSTOMY, INSERT TUBE, COMBINED N/A 1/1/2020    Procedure: GASTRIC-JEJUNAL FEEDING TUBE INSERTION;  Surgeon: Ayan Lopez MD;  Location:  OR     GENITOURINARY SURGERY      suprapubic catheter     HERNIA REPAIR  1957    double hernia     HYSTERECTOMY, PAP NO LONGER INDICATED      CELIA and large ovarian tumor removed     INJECT BOTOX N/A 9/21/2018    Procedure: INJECT BOTOX;;  Surgeon: Yoandy Rios MD;  [facility]     INJECT BOTOX N/A 3/15/2019    Procedure: Inject Botox into Bladder;  Surgeon: Yoandy Rios MD;  [facility]     IR GASTRO JEJUNOSTOMY TUBE PLACEMENT  6/11/2020     IR GASTROSTOMY TUBE CHANGE  6/30/2020     IR JEJUNOSTOMY TUBE CHANGE  2/4/2020     IR JEJUNOSTOMY TUBE CHANGE  2/19/2020     LAPAROSCOPY DIAGNOSTIC (GENERAL) N/A 12/30/2019    Procedure: Laparoscopy diagnostic (general);  Surgeon: Ayan Lopez MD;  Location:  OR     LAPAROTOMY EXPLORATORY N/A 1/1/2020    Procedure: EXPLORATORY LAPAROTOMY;   "Surgeon: Ayan Lopez MD;  Location[facility]     LAPAROTOMY EXPLORATORY N/A 12/30/2019    Procedure: LAPAROTOMY, REPAIR OF ULCER PERFORATION;  Surgeon: Ayan Lopez MD;  Location: [facility]     SURGICAL HISTORY OF -       left cataract surgery     SURGICAL HISTORY OF -   12/14    right cataract surgery and left laser revision     SURGICAL HISTORY OF -   March 2015    left carpal tunnel release     TONSILLECTOMY  1974     ZZC LAMINECTOMY,FACETECTOMY,LUMBAR  1982     ZZC TOTAL KNEE ARTHROPLASTY  1999    left      Allergies   Allergen Reactions     Povidone Iodine      \"mild skin irritation\" per patient report     Toradol [Ketorolac] Other (See Comments)     Pt gets nightmares     Tramadol Other (See Comments)      Social History     Tobacco Use     Smoking status: Never     Smokeless tobacco: Never   Substance Use Topics     Alcohol use: No      Wt Readings from Last 1 Encounters:   11/18/22 109.7 kg (241 lb 13.5 oz)        Anesthesia Evaluation            ROS/MED HX  ENT/Pulmonary:     (+) sleep apnea, asthma     Neurologic:       Cardiovascular:     (+) hypertension--CAD ---CHF     METS/Exercise Tolerance:     Hematologic:       Musculoskeletal:       GI/Hepatic:       Renal/Genitourinary:     (+) renal disease,     Endo:     (+) type II DM, thyroid problem, hypothyroidism, Obesity,     Psychiatric/Substance Use:       Infectious Disease:       Malignancy:       Other:      (+) , H/O Chronic Pain,        Physical Exam    Airway  airway exam normal      Mallampati: II   TM distance: > 3 FB   Neck ROM: full   Mouth opening: > 3 cm    Respiratory Devices and Support         Dental  no notable dental history         Cardiovascular          Rhythm and rate: regular and normal     Pulmonary   pulmonary exam normal        breath sounds clear to auscultation           OUTSIDE LABS:  CBC:   Lab Results   Component Value Date    WBC 7.2 11/15/2022    WBC 8.1 06/30/2022    HGB 12.8 11/15/2022    HGB 12.9 06/30/2022 "    HCT 40.8 11/15/2022    HCT 41.3 06/30/2022     11/15/2022     06/30/2022     BMP:   Lab Results   Component Value Date     11/15/2022     06/30/2022    POTASSIUM 4.8 11/15/2022    POTASSIUM 4.6 06/30/2022    CHLORIDE 98 11/15/2022    CHLORIDE 101 06/30/2022    CO2 29 11/15/2022    CO2 29 06/30/2022    BUN 33.2 (H) 11/15/2022    BUN 29 06/30/2022    CR 0.80 11/15/2022    CR 1.0 08/17/2022     (H) 11/15/2022    GLC 96 06/30/2022     COAGS:   Lab Results   Component Value Date    PTT 28 12/29/2019    INR 1.09 06/15/2020    FIBR 576 (H) 01/19/2009     POC:   Lab Results   Component Value Date     (H) 06/16/2020     HEPATIC:   Lab Results   Component Value Date    ALBUMIN 4.2 11/15/2022    PROTTOTAL 7.5 11/15/2022    ALT 20 11/15/2022    AST 27 11/15/2022    ALKPHOS 90 11/15/2022    BILITOTAL 0.2 11/15/2022     OTHER:   Lab Results   Component Value Date    PH 7.35 01/18/2020    LACT 0.7 06/08/2020    A1C 5.8 (H) 11/15/2022    DORY 10.0 11/15/2022    PHOS 3.3 06/15/2020    MAG 1.9 06/15/2020    LIPASE 34 (L) 06/03/2020    AMYLASE 44 01/08/2009    TSH 1.56 03/02/2022    T4 1.03 08/15/2018    CRP 30.0 (H) 05/16/2016    SED 11 03/04/2010       Anesthesia Plan    ASA Status:  3   NPO Status:  NPO Appropriate    Anesthesia Type: MAC.     - Reason for MAC: immobility needed, straight local not clinically adequate   Induction: Intravenous.   Maintenance: TIVA.        Consents    Anesthesia Plan(s) and associated risks, benefits, and realistic alternatives discussed. Questions answered and patient/representative(s) expressed understanding.    - Discussed:     - Discussed with:  Patient      - Extended Intubation/Ventilatory Support Discussed: No.      - Patient is DNR/DNI Status: No    Use of blood products discussed: No .     Postoperative Care    Pain management: IV analgesics, Oral pain medications, Multi-modal analgesia.   PONV prophylaxis: Ondansetron (or other 5HT-3), Background  Propofol Infusion     Comments:                Rogers Gupta MD

## 2022-11-18 NOTE — ANESTHESIA POSTPROCEDURE EVALUATION
Patient: Bhavani White    Procedure: Procedure(s):  CYSTOSCOPY, WITH BOTULINUM TOXIN INJECTION       Anesthesia Type:  MAC    Note:  Disposition: Outpatient   Postop Pain Control: Uneventful            Sign Out: Well controlled pain   PONV: No   Neuro/Psych: Uneventful            Sign Out: Acceptable/Baseline neuro status   Airway/Respiratory: Uneventful            Sign Out: Acceptable/Baseline resp. status   CV/Hemodynamics: Uneventful            Sign Out: Acceptable CV status; No obvious hypovolemia; No obvious fluid overload   Other NRE: NONE   DID A NON-ROUTINE EVENT OCCUR? No           Last vitals:  Vitals Value Taken Time   /69 11/18/22 1608   Temp 36.4  C (97.5  F) 11/18/22 1608   Pulse 102 11/18/22 1608   Resp 16 11/18/22 1608   SpO2 94 % 11/18/22 1608       Electronically Signed By: Jasvir Gamze MD, MD  November 18, 2022  4:24 PM

## 2022-11-18 NOTE — OP NOTE
OPERATIVE REPORT    PREOPERATIVE DIAGNOSIS:  Neurogenic bladder    POSTOPERATIVE DIAGNOSIS: Same    PROCEDURES PERFORMED:   1. Cystourethroscopy with injection of botulinum toxin A 200 units in 20cc sterile saline    STAFF SURGEON: Yoandy Rios MD, MS    Fellow: Dr. Hernán Tellez    RESIDENT(S):  Ronaldo Mauricio MD    ANESTHESIA: MAC with Local    ESTIMATED BLOOD LOSS:1ml    DRAINS: None     SIGNIFICANT FINDINGS:  1. Unremarkable botox injection     OPERATIVE INDICATIONS:   Bhavani White is a(n) 67 year old female with a history of neurogenic bladder. The patient was counseled on the alternatives, risks, and benefits and elected to proceed with the above stated procedure.    DESCRIPTION OF PROCEDURE:   After verification of informed consent was obtained, the patient was brought to the operating room, and moved to the operating table. After adequate anesthesia was induced, the patient was repositioned in the lithotomy position and prepped and draped in the usual sterile fashion. A formal timeout was performed to confirm the correct patient, procedure and operative site.     A 21-Malawian Valdovinos injection cystoscope was placed into the bladder.  The bladder mucosa appeared to be normal without stones, tumors or diverticulum on 360 degree inspection. The ureteral orifices were in the normal orthotopic position bilaterally.  We mixed 2 vials of 100 U botulinum toxin A into 20 mL of injectable saline for a total of (10 units/mL).  We performed a template injection procedure with 5 columns of 4 injections. All injections were placed deep to the mucosa into the detrusor muscle.  We then emptied her bladder to re-inspect our injection sites and found that there was a minimal amount of blood. The patient's bladder was then emptied again. The patient was returned to supine position and transported to recovery in stable condition.    The procedure was then concluded. The patient was transferred to the postanesthesia care unit  in stable condition and tolerated the procedure well.    POSTOP PLAN:  - Follow up for a virtual visit with Dr. Rios in 3 months

## 2022-11-18 NOTE — ANESTHESIA CARE TRANSFER NOTE
Patient: Bhavani White    Procedure: Procedure(s):  CYSTOSCOPY, WITH BOTULINUM TOXIN INJECTION       Diagnosis: Neurogenic bladder [N31.9]  Diagnosis Additional Information: No value filed.    Anesthesia Type:   MAC     Note:    Oropharynx: oropharynx clear of all foreign objects and spontaneously breathing  Level of Consciousness: awake and drowsy  Oxygen Supplementation: room air    Independent Airway: airway patency satisfactory and stable  Dentition: dentition unchanged  Vital Signs Stable: post-procedure vital signs reviewed and stable  Report to RN Given: handoff report given  Patient transferred to: Phase II    Handoff Report: Identifed the Patient, Identified the Reponsible Provider, Reviewed the pertinent medical history, Discussed the surgical course, Reviewed Intra-OP anesthesia mangement and issues during anesthesia, Set expectations for post-procedure period and Allowed opportunity for questions and acknowledgement of understanding      Vitals:  Vitals Value Taken Time   BP     Temp     Pulse     Resp     SpO2         Electronically Signed By: ELISSA Delatorre CRNA  November 18, 2022  4:06 PM

## 2022-11-18 NOTE — DISCHARGE INSTRUCTIONS
[facility]  Home Care Following Anesthesia  For 24 hours after surgery:  Get plenty of rest.  A responsible adult must stay with you for at least 24 hours after you leave the surgery center.  Do not drive or use heavy equipment.  If you have weakness or tingling, don't drive or use heavy equipment until this feeling goes away.   Do not drink alcohol.   Avoid strenuous or risky activities.  Ask for help when climbing stairs.  You may feel lightheaded.  IF so, sit for a few minutes before standing.  Have someone help you get up.   If you have nausea (feel sick to your stomach): Drink only clear liquids such as apple juice, ginger ale, broth or 7-Up.  Rest may also help.  Be sure to drink enough fluids.  Move to a regular diet as you feel able.   You may have a slight fever.  Call the doctor if your fever is over 100 F (37.7 C) (taken under the tongue) or lasts longer than 24 hours.  You may have a dry mouth, a sore throat, muscle aches or trouble sleeping. These should go away after 24 hours.  Do not make important or legal decisions.   It is recommended to avoid smoking.               Tips for taking pain medications  To get the best pain relief possible, remember these points:  Take pain medications as directed, before pain becomes severe.  Pain medication can upset your stomach: taking it with food may help.  Constipation is a common side effect of pain medication. Drink plenty of  fluids.  Eat foods high in fiber. Take a stool softener if recommended by your doctor or pharmacist.  Do not drink alcohol, drive or operate machinery while taking pain medications.  Ask about other ways to control pain, such as with heat, ice or relaxation.    Tylenol/Acetaminophen Consumption  To help encourage the safe use of acetaminophen, the makers of TYLENOL  have lowered the maximum daily dose for single-ingredient Extra Strength TYLENOL  (acetaminophen) products sold in the [place] from 8 pills  per day (4,000 mg) to 6 pills per day (3,000 mg). The dosing interval has also changed from 2 pills every 4-6 hours to 2 pills every 6 hours.  If you feel your pain relief is insufficient, you may take Tylenol/Acetaminophen in addition to your narcotic pain medication.   Be careful not to exceed 3,000 mg of Tylenol/Acetaminophen in a 24 hour period from all sources.  If you are taking extra strength Tylenol/acetaminophen (500 mg), the maximum dose is 6 tablets in 24 hours.  If you are taking regular strength acetaminophen (325 mg), the maximum dose is 9 tablets in 24 hours.    Call a doctor for any of the following:  Signs of infection (fever, growing tenderness at the surgery site, a large amount of drainage or bleeding, severe pain, foul-smelling drainage, redness, swelling).  It has been over 8 to 10 hours since surgery and you are still not able to urinate (pass water).  Headache for over 24 hours.  Numbness, tingling or weakness the day after surgery (if you had spinal anesthesia).  Signs of Covid-19 infection (temperature over 100 degrees, shortness of breath, cough, loss of taste/smell, generalized body aches, persistent headache, chills, sore throat, nausea/vomiting/diarrhea)  Your doctor is:  Dr. Yoandy Lisa, Prostate and Urology: [phone]                    Or dial [phone] and ask for the resident on call for:  Prostate Urology  For emergency care, call the:  [facility]:  [phone] (TTY for hearing impaired: [phone])

## 2022-11-18 NOTE — INTERVAL H&P NOTE
"I have reviewed the surgical (or preoperative) H&P that is linked to this encounter, and examined the patient. There are no significant changes    Clinical Conditions Present on Arrival:  Clinically Significant Risk Factors Present on Admission                    # Severe Obesity: Estimated body mass index is 40.25 kg/m  as calculated from the following:    Height as of this encounter: 1.651 m (5' 5\").    Weight as of this encounter: 109.7 kg (241 lb 13.5 oz).       "

## 2022-12-02 ENCOUNTER — MYC MEDICAL ADVICE (OUTPATIENT)
Dept: FAMILY MEDICINE | Facility: CLINIC | Age: 67
End: 2022-12-02

## 2022-12-17 DIAGNOSIS — N18.2 CHRONIC KIDNEY DISEASE, STAGE 2 (MILD): ICD-10-CM

## 2022-12-19 RX ORDER — LISINOPRIL 2.5 MG/1
TABLET ORAL
Qty: 90 TABLET | Refills: 3 | OUTPATIENT
Start: 2022-12-19

## 2022-12-19 NOTE — TELEPHONE ENCOUNTER
Patient sent year supply on 3/2/22, should have refills available at [facility] #[id number]    Alberta ZAZUETA RN, BSN, PHN  [facility]

## 2022-12-28 NOTE — PROGRESS NOTES
Bhavani White is a 64 year old female who is being evaluated via a billable video visit.      Bhavani White is a 64 year old woman with neurogenic bladder due to spinal stenosis. She is managed with a suprapubic tube but had refractory urge and urge incontinence so we did Botox injection. Her last injection was 2019 and she still feels well -- no urge or urge incontinence. She is taking oxybutinin 15 mg every day.     She was recently discharged from [facility] for ruptured duodenal ulcer requiring ex lap and repair. Now is back home. Last drain was recently removed.     She needs an order for visiting nurse to come to house for SPT change.     Past Medical History:   Diagnosis Date     Anxiety      Asthma      Depression      DM (diabetes mellitus) (H)      Frequent UTI      History of blood transfusion      History of ulcer disease 2014    She notes from [facility]; nearly . Discussed a hospitalization at [facility] for this.     Hypertension      Hypothyroid      Iatrogenic Cushing's disease (H)     off prednisone now     Infection due to alpha-hemolytic Streptococcus 2020     Morbid obesity (H)      MRSA (methicillin resistant staph aureus) culture positive 2012    repeat cx 10/12/2012 no staph mentioned.     Osteoarthritis     multiple joings.     Other chronic pain      Sleep apnea     No longer uses cpap.     Past Surgical History:   Procedure Laterality Date     ABDOMEN SURGERY       APPLY WOUND VAC  2019    Procedure: Apply Wound Vac;  Surgeon: Ayan Lopez MD;  Location: [facility] OR     C LAMINECTOMY,FACETECTOMY,LUMBAR  1982     C TOTAL KNEE ARTHROPLASTY      left     CLOSE SECONDARY WOUND ABDOMEN N/A 2020    Procedure: SECONDARY ABDOMINAL WOUND CLOSURE;  Surgeon: Ayan Lopez MD;  Location:  OR     CYSTOSCOPY N/A 2018    Procedure: CYSTOSCOPY;  Cystoscopy and Botox Injection Into the Bladder and suprapubic tube exchange;  Surgeon: Yoandy Rios  MD Lyle;  [facility]     CYSTOSCOPY N/A 3/15/2019    Procedure: Cystoscopy, suprapubic tube exchange;  Surgeon: Yoandy Rios MD;  [facility]     CYSTOSCOPY FLEXIBLE, CYSTOSTOMY, INSERT TUBE SUPRAPUBIC, COMBINED N/A 11/1/2019    Procedure: Cystoscopy, Bladder Botox Injection, Suprapubic Tube Change;  Surgeon: Yoandy Rios MD;  [facility]     CYSTOSCOPY, INTRAVESICAL INJECTION N/A 8/23/2017    Procedure: CYSTOSCOPY, INTRAVESICAL INJECTION;  Cystoscopy, Botox Injection Into The Bladder  Latex Allergy ;  Surgeon: Yoandy Rios MD;  [facility]     CYSTOSCOPY, INTRAVESICAL INJECTION N/A 3/8/2018    Procedure: CYSTOSCOPY, INTRAVESICAL INJECTION;  Cystoscopy, Botox Injection Into The Bladder and suprapubic catheter exchange;  Surgeon: Yoandy Rios MD;  [facility]     DISCECTOMY, FUSION CERVICAL ANTERIOR THREE+ LEVELS, COMBINED Left 5/3/2016    Procedure: COMBINED DISCECTOMY, FUSION CERVICAL ANTERIOR THREE+ LEVELS;  Surgeon: Jasvir Torres MD;  [facility]     GASTROSTOMY, INSERT TUBE, COMBINED N/A 1/1/2020    Procedure: GASTRIC-JEJUNAL FEEDING TUBE INSERTION;  Surgeon: Ayan Lopez MD;  Location:  OR     GENITOURINARY SURGERY      suprapubic catheter     HERNIA REPAIR  1957    double hernia     HYSTERECTOMY, PAP NO LONGER INDICATED      CELIA and large ovarian tumor removed     INJECT BOTOX N/A 9/21/2018    Procedure: INJECT BOTOX;;  Surgeon: Yoandy Rios MD;  [facility]     INJECT BOTOX N/A 3/15/2019    Procedure: Inject Botox into Bladder;  Surgeon: Yoandy Rios MD;  [facility]     IR JEJUNOSTOMY TUBE CHANGE  2/4/2020     IR JEJUNOSTOMY TUBE CHANGE  2/19/2020     LAPAROSCOPY DIAGNOSTIC (GENERAL) N/A 12/30/2019    Procedure: Laparoscopy diagnostic (general);  Surgeon: Ayan Lopez MD;  Location:  OR     LAPAROTOMY EXPLORATORY N/A 1/1/2020    Procedure: EXPLORATORY LAPAROTOMY;  Surgeon: Jessica  "Ayan Sanchez MD;  Location:  OR     LAPAROTOMY EXPLORATORY N/A 12/30/2019    Procedure: LAPAROTOMY, REPAIR OF ULCER PERFORATION;  Surgeon: Ayan Lopez MD;  Location: [facility] OR     SURGICAL HISTORY OF -       left cataract surgery     SURGICAL HISTORY OF -   12/14    right cataract surgery and left laser revision     SURGICAL HISTORY OF -   March 2015    left carpal tunnel release     TONSILLECTOMY  1974     Current Outpatient Medications   Medication     amLODIPine (NORVASC) 5 MG tablet     bisacodyl (DULCOLAX) 10 MG suppository     docusate sodium (COLACE) 100 MG capsule     DULoxetine (CYMBALTA) 60 MG capsule     gabapentin (NEURONTIN) 800 MG tablet     hydrOXYzine (ATARAX) 50 MG tablet     hypromellose-dextran (ARTIFICAL TEARS) 0.1-0.3 % ophthalmic solution     ipratropium - albuterol 0.5 mg/2.5 mg/3 mL (DUONEB) 0.5-2.5 (3) MG/3ML neb solution     levothyroxine (SYNTHROID/LEVOTHROID) 88 MCG tablet     lisinopril (ZESTRIL) 5 MG tablet     metFORMIN (GLUCOPHAGE) 500 MG tablet     metFORMIN (GLUCOPHAGE-XR) 500 MG 24 hr tablet     methadone (DOLOPHINE) 10 MG tablet     miconazole (MICATIN/MICRO GUARD) 2 % external powder     ondansetron (ZOFRAN-ODT) 4 MG ODT tab     order for DME     order for DME     order for DME     order for DME     order for DME     OXYBUTYNIN CHLORIDE PO     oxybutynin ER (DITROPAN XL) 15 MG 24 hr tablet     pantoprazole (PROTONIX) 40 MG EC tablet     polyethylene glycol (MIRALAX/GLYCOLAX) packet     potassium chloride ER (KLOR-CON M) 20 MEQ CR tablet     rosuvastatin (CRESTOR) 5 MG tablet     senna-docusate (SENOKOT-S;PERICOLACE) 8.6-50 MG per tablet     tiZANidine (ZANAFLEX) 4 MG tablet     traZODone (DESYREL) 100 MG tablet     VENTOLIN  (90 Base) MCG/ACT inhaler     No current facility-administered medications for this visit.         Allergies   Allergen Reactions     Povidone Iodine      \"mild skin irritation\" per patient report     Toradol [Ketorolac] Other (See Comments)    " " Pt gets nightmares     Tramadol Other (See Comments)      ROS: 10 point ROS neg other than the symptoms noted above in the HPI.    Exam:  Exam limited due to video visit. I could not examine her SPT site because she could not get into bed to show it to me.  A+O x 3  EOMI  Speech normal  Affect normal  Breathing non-labored.    Imaging:  Reviewed results of CT scan from March 2020 showing no hydronephrosis.    A/P: 65 y/o with neurogenic bladder managed with SPT who is doing well with oxybutinin and Botox 6 months ago. No need for repeat Botox now -- will call me when she needs it. She does need her SPT changed. Will arrange for visiting nurse to do that if covered by insurance -- it is difficult for her to come to clinic due to her mobility issues.     The patient has been notified of following:     \"This video visit will be conducted via a call between you and your physician/provider. We have found that certain health care needs can be provided without the need for an in-person physical exam.  This service lets us provide the care you need with a video conversation.  If a prescription is necessary we can send it directly to your pharmacy.  If lab work is needed we can place an order for that and you can then stop by our lab to have the test done at a later time.    Video visits are billed at different rates depending on your insurance coverage.  Please reach out to your insurance provider with any questions.    If during the course of the call the physician/provider feels a video visit is not appropriate, you will not be charged for this service.\"    Patient has given verbal consent for Video visit? Yes    How would you like to obtain your AVS? Paulie    Patient would like the video invitation sent by: Text to cell phone: [phone]    Will anyone else be joining your video visit? No      Video-Visit Details    Type of service:  Video Visit    Video Start Time: 3:30  Video End Time: 3:43 PM    Originating " Location (pt. Location): Home    Distant Location (provider location):  [facility] UROLOGY AND [facility]     Platform used for Video Visit: Reid Rios MD         59.4

## 2022-12-29 ENCOUNTER — MEDICAL CORRESPONDENCE (OUTPATIENT)
Dept: HEALTH INFORMATION MANAGEMENT | Facility: CLINIC | Age: 67
End: 2022-12-29
Payer: MEDICARE

## 2023-01-01 ENCOUNTER — DOCUMENTATION ONLY (OUTPATIENT)
Dept: UROLOGY | Facility: CLINIC | Age: 68
End: 2023-01-01
Payer: COMMERCIAL

## 2023-01-01 ENCOUNTER — ANESTHESIA EVENT (OUTPATIENT)
Dept: SURGERY | Facility: CLINIC | Age: 68
End: 2023-01-01
Payer: COMMERCIAL

## 2023-01-01 ENCOUNTER — PATIENT OUTREACH (OUTPATIENT)
Dept: CARE COORDINATION | Facility: CLINIC | Age: 68
End: 2023-01-01
Payer: COMMERCIAL

## 2023-01-01 ENCOUNTER — MEDICAL CORRESPONDENCE (OUTPATIENT)
Dept: HEALTH INFORMATION MANAGEMENT | Facility: CLINIC | Age: 68
End: 2023-01-01

## 2023-01-01 ENCOUNTER — TELEPHONE (OUTPATIENT)
Dept: FAMILY MEDICINE | Facility: CLINIC | Age: 68
End: 2023-01-01
Payer: COMMERCIAL

## 2023-01-01 ENCOUNTER — TELEPHONE (OUTPATIENT)
Dept: FAMILY MEDICINE | Facility: CLINIC | Age: 68
End: 2023-01-01

## 2023-01-01 ENCOUNTER — PATIENT OUTREACH (OUTPATIENT)
Dept: UROLOGY | Facility: CLINIC | Age: 68
End: 2023-01-01
Payer: COMMERCIAL

## 2023-01-01 ENCOUNTER — TELEPHONE (OUTPATIENT)
Dept: SURGERY | Facility: CLINIC | Age: 68
End: 2023-01-01
Payer: COMMERCIAL

## 2023-01-01 ENCOUNTER — TRANSFERRED RECORDS (OUTPATIENT)
Dept: HEALTH INFORMATION MANAGEMENT | Facility: CLINIC | Age: 68
End: 2023-01-01
Payer: COMMERCIAL

## 2023-01-01 ENCOUNTER — LAB (OUTPATIENT)
Dept: LAB | Facility: CLINIC | Age: 68
End: 2023-01-01
Payer: COMMERCIAL

## 2023-01-01 ENCOUNTER — LAB REQUISITION (OUTPATIENT)
Dept: LAB | Facility: CLINIC | Age: 68
End: 2023-01-01
Payer: COMMERCIAL

## 2023-01-01 ENCOUNTER — MYC MEDICAL ADVICE (OUTPATIENT)
Dept: FAMILY MEDICINE | Facility: CLINIC | Age: 68
End: 2023-01-01
Payer: COMMERCIAL

## 2023-01-01 ENCOUNTER — VIRTUAL VISIT (OUTPATIENT)
Dept: FAMILY MEDICINE | Facility: CLINIC | Age: 68
End: 2023-01-01
Payer: COMMERCIAL

## 2023-01-01 ENCOUNTER — ANESTHESIA (OUTPATIENT)
Dept: SURGERY | Facility: CLINIC | Age: 68
DRG: 660 | End: 2023-01-01
Payer: COMMERCIAL

## 2023-01-01 ENCOUNTER — PRE VISIT (OUTPATIENT)
Dept: UROLOGY | Facility: CLINIC | Age: 68
End: 2023-01-01
Payer: COMMERCIAL

## 2023-01-01 ENCOUNTER — HOSPITAL ENCOUNTER (OUTPATIENT)
Dept: CT IMAGING | Facility: CLINIC | Age: 68
Discharge: HOME | End: 2023-10-23
Attending: INTERNAL MEDICINE | Admitting: INTERNAL MEDICINE
Payer: COMMERCIAL

## 2023-01-01 ENCOUNTER — HEALTH MAINTENANCE LETTER (OUTPATIENT)
Age: 68
End: 2023-01-01

## 2023-01-01 ENCOUNTER — TRANSFERRED RECORDS (OUTPATIENT)
Dept: HEALTH INFORMATION MANAGEMENT | Facility: CLINIC | Age: 68
End: 2023-01-01

## 2023-01-01 ENCOUNTER — MYC MEDICAL ADVICE (OUTPATIENT)
Dept: UROLOGY | Facility: CLINIC | Age: 68
End: 2023-01-01
Payer: COMMERCIAL

## 2023-01-01 ENCOUNTER — MYC MEDICAL ADVICE (OUTPATIENT)
Dept: UROLOGY | Facility: CLINIC | Age: 68
End: 2023-01-01

## 2023-01-01 ENCOUNTER — MYC MEDICAL ADVICE (OUTPATIENT)
Dept: CARDIOLOGY | Facility: CLINIC | Age: 68
End: 2023-01-01
Payer: COMMERCIAL

## 2023-01-01 ENCOUNTER — NURSE TRIAGE (OUTPATIENT)
Dept: NURSING | Facility: CLINIC | Age: 68
End: 2023-01-01
Payer: COMMERCIAL

## 2023-01-01 ENCOUNTER — MEDICAL CORRESPONDENCE (OUTPATIENT)
Dept: UROLOGY | Facility: CLINIC | Age: 68
End: 2023-01-01
Payer: COMMERCIAL

## 2023-01-01 ENCOUNTER — APPOINTMENT (OUTPATIENT)
Dept: CT IMAGING | Facility: CLINIC | Age: 68
DRG: 660 | End: 2023-01-01
Attending: EMERGENCY MEDICINE
Payer: COMMERCIAL

## 2023-01-01 ENCOUNTER — ANCILLARY PROCEDURE (OUTPATIENT)
Dept: CT IMAGING | Facility: CLINIC | Age: 68
End: 2023-01-01
Attending: INTERNAL MEDICINE
Payer: COMMERCIAL

## 2023-01-01 ENCOUNTER — ANESTHESIA EVENT (OUTPATIENT)
Dept: SURGERY | Facility: AMBULATORY SURGERY CENTER | Age: 68
End: 2023-01-01
Payer: COMMERCIAL

## 2023-01-01 ENCOUNTER — TELEPHONE (OUTPATIENT)
Dept: UROLOGY | Facility: CLINIC | Age: 68
End: 2023-01-01
Payer: COMMERCIAL

## 2023-01-01 ENCOUNTER — HOSPITAL ENCOUNTER (OUTPATIENT)
Dept: MRI IMAGING | Facility: CLINIC | Age: 68
Discharge: HOME | End: 2023-03-16
Attending: PHYSICIAN ASSISTANT | Admitting: PHYSICIAN ASSISTANT
Payer: COMMERCIAL

## 2023-01-01 ENCOUNTER — HOSPITAL ENCOUNTER (OUTPATIENT)
Facility: CLINIC | Age: 68
Discharge: HOME | End: 2023-11-22
Attending: UROLOGY | Admitting: UROLOGY
Payer: COMMERCIAL

## 2023-01-01 ENCOUNTER — TRANSFERRED RECORDS (OUTPATIENT)
Dept: MULTI SPECIALTY CLINIC | Facility: CLINIC | Age: 68
End: 2023-01-01

## 2023-01-01 ENCOUNTER — TELEPHONE (OUTPATIENT)
Dept: PEDIATRICS | Facility: CLINIC | Age: 68
End: 2023-01-01

## 2023-01-01 ENCOUNTER — VIRTUAL VISIT (OUTPATIENT)
Dept: UROLOGY | Facility: CLINIC | Age: 68
End: 2023-01-01
Payer: COMMERCIAL

## 2023-01-01 ENCOUNTER — MEDICAL CORRESPONDENCE (OUTPATIENT)
Dept: HEALTH INFORMATION MANAGEMENT | Facility: CLINIC | Age: 68
End: 2023-01-01
Payer: COMMERCIAL

## 2023-01-01 ENCOUNTER — HOSPITAL ENCOUNTER (INPATIENT)
Facility: CLINIC | Age: 68
LOS: 3 days | Discharge: HOME | DRG: 660 | End: 2023-11-01
Attending: EMERGENCY MEDICINE | Admitting: INTERNAL MEDICINE
Payer: COMMERCIAL

## 2023-01-01 ENCOUNTER — PRE VISIT (OUTPATIENT)
Dept: SURGERY | Facility: CLINIC | Age: 68
End: 2023-01-01

## 2023-01-01 ENCOUNTER — ANESTHESIA EVENT (OUTPATIENT)
Dept: SURGERY | Facility: CLINIC | Age: 68
DRG: 660 | End: 2023-01-01
Payer: COMMERCIAL

## 2023-01-01 ENCOUNTER — APPOINTMENT (OUTPATIENT)
Dept: GENERAL RADIOLOGY | Facility: CLINIC | Age: 68
DRG: 660 | End: 2023-01-01
Attending: STUDENT IN AN ORGANIZED HEALTH CARE EDUCATION/TRAINING PROGRAM
Payer: COMMERCIAL

## 2023-01-01 ENCOUNTER — TELEPHONE (OUTPATIENT)
Dept: UROLOGY | Facility: CLINIC | Age: 68
End: 2023-01-01

## 2023-01-01 ENCOUNTER — APPOINTMENT (OUTPATIENT)
Dept: PHYSICAL THERAPY | Facility: CLINIC | Age: 68
DRG: 660 | End: 2023-01-01
Attending: STUDENT IN AN ORGANIZED HEALTH CARE EDUCATION/TRAINING PROGRAM
Payer: COMMERCIAL

## 2023-01-01 ENCOUNTER — ANESTHESIA (OUTPATIENT)
Dept: SURGERY | Facility: CLINIC | Age: 68
End: 2023-01-01
Payer: COMMERCIAL

## 2023-01-01 ENCOUNTER — MEDICAL CORRESPONDENCE (OUTPATIENT)
Dept: UROLOGY | Facility: CLINIC | Age: 68
End: 2023-01-01

## 2023-01-01 ENCOUNTER — VIRTUAL VISIT (OUTPATIENT)
Dept: SURGERY | Facility: CLINIC | Age: 68
End: 2023-01-01
Payer: COMMERCIAL

## 2023-01-01 VITALS
RESPIRATION RATE: 18 BRPM | SYSTOLIC BLOOD PRESSURE: 141 MMHG | TEMPERATURE: 98.4 F | HEIGHT: 65 IN | OXYGEN SATURATION: 95 % | HEART RATE: 62 BPM | WEIGHT: 209.22 LBS | BODY MASS INDEX: 34.86 KG/M2 | DIASTOLIC BLOOD PRESSURE: 66 MMHG

## 2023-01-01 VITALS
DIASTOLIC BLOOD PRESSURE: 91 MMHG | SYSTOLIC BLOOD PRESSURE: 161 MMHG | RESPIRATION RATE: 18 BRPM | OXYGEN SATURATION: 96 %

## 2023-01-01 DIAGNOSIS — E11.9 TYPE 2 DIABETES MELLITUS WITHOUT COMPLICATION, WITHOUT LONG-TERM CURRENT USE OF INSULIN (H): Primary | ICD-10-CM

## 2023-01-01 DIAGNOSIS — E11.9 TYPE 2 DIABETES MELLITUS WITHOUT COMPLICATION, WITHOUT LONG-TERM CURRENT USE OF INSULIN (H): ICD-10-CM

## 2023-01-01 DIAGNOSIS — N18.2 CHRONIC KIDNEY DISEASE, STAGE 2 (MILD): ICD-10-CM

## 2023-01-01 DIAGNOSIS — N20.1 LEFT URETERAL STONE: Primary | ICD-10-CM

## 2023-01-01 DIAGNOSIS — N31.9 NEUROGENIC BLADDER: ICD-10-CM

## 2023-01-01 DIAGNOSIS — E03.4 HYPOTHYROIDISM DUE TO ACQUIRED ATROPHY OF THYROID: ICD-10-CM

## 2023-01-01 DIAGNOSIS — F33.42 RECURRENT MAJOR DEPRESSIVE DISORDER, IN FULL REMISSION (H): ICD-10-CM

## 2023-01-01 DIAGNOSIS — E11.21 TYPE 2 DIABETES MELLITUS WITH DIABETIC NEPHROPATHY, WITHOUT LONG-TERM CURRENT USE OF INSULIN (H): ICD-10-CM

## 2023-01-01 DIAGNOSIS — Z12.11 SCREEN FOR COLON CANCER: ICD-10-CM

## 2023-01-01 DIAGNOSIS — I50.32 CHRONIC DIASTOLIC (CONGESTIVE) HEART FAILURE (H): ICD-10-CM

## 2023-01-01 DIAGNOSIS — R39.89 URETHRAL PAIN: ICD-10-CM

## 2023-01-01 DIAGNOSIS — Z53.9 DIAGNOSIS NOT YET DEFINED: Primary | ICD-10-CM

## 2023-01-01 DIAGNOSIS — A41.9 SEPSIS, DUE TO UNSPECIFIED ORGANISM, UNSPECIFIED WHETHER ACUTE ORGAN DYSFUNCTION PRESENT (H): ICD-10-CM

## 2023-01-01 DIAGNOSIS — G82.20 PARAPLEGIA (H): Primary | ICD-10-CM

## 2023-01-01 DIAGNOSIS — N20.1 URETERAL STONE: ICD-10-CM

## 2023-01-01 DIAGNOSIS — J45.30 MILD PERSISTENT ASTHMA WITHOUT COMPLICATION: ICD-10-CM

## 2023-01-01 DIAGNOSIS — K70.31 ALCOHOLIC CIRRHOSIS OF LIVER WITH ASCITES (H): ICD-10-CM

## 2023-01-01 DIAGNOSIS — N28.9 RENAL LESION: ICD-10-CM

## 2023-01-01 DIAGNOSIS — T83.511A URINARY TRACT INFECTION ASSOCIATED WITH CATHETERIZATION OF URINARY TRACT, UNSPECIFIED INDWELLING URINARY CATHETER TYPE, INITIAL ENCOUNTER (H): ICD-10-CM

## 2023-01-01 DIAGNOSIS — N31.9 NEUROGENIC BLADDER: Primary | ICD-10-CM

## 2023-01-01 DIAGNOSIS — K74.60 CIRRHOSIS (H): ICD-10-CM

## 2023-01-01 DIAGNOSIS — N20.1 LEFT URETERAL STONE: ICD-10-CM

## 2023-01-01 DIAGNOSIS — N39.0 RECURRENT UTI: Primary | ICD-10-CM

## 2023-01-01 DIAGNOSIS — Z93.59 OTHER CYSTOSTOMY STATUS (H): ICD-10-CM

## 2023-01-01 DIAGNOSIS — Z01.818 PRE-OP EVALUATION: Primary | ICD-10-CM

## 2023-01-01 DIAGNOSIS — E66.01 MORBID OBESITY (H): ICD-10-CM

## 2023-01-01 DIAGNOSIS — G82.20 PARAPLEGIA (H): ICD-10-CM

## 2023-01-01 DIAGNOSIS — K74.60 HEPATIC CIRRHOSIS (H): ICD-10-CM

## 2023-01-01 DIAGNOSIS — Z78.0 ASYMPTOMATIC POSTMENOPAUSAL STATUS: ICD-10-CM

## 2023-01-01 DIAGNOSIS — N32.89 BLADDER SPASMS: ICD-10-CM

## 2023-01-01 DIAGNOSIS — N39.0 URINARY TRACT INFECTION ASSOCIATED WITH CATHETERIZATION OF URINARY TRACT, UNSPECIFIED INDWELLING URINARY CATHETER TYPE, INITIAL ENCOUNTER (H): ICD-10-CM

## 2023-01-01 DIAGNOSIS — J45.40 MODERATE PERSISTENT ASTHMA WITHOUT COMPLICATION: Primary | ICD-10-CM

## 2023-01-01 DIAGNOSIS — R52 PAIN: ICD-10-CM

## 2023-01-01 DIAGNOSIS — N39.0 RECURRENT UTI: ICD-10-CM

## 2023-01-01 DIAGNOSIS — Z87.440 PERSONAL HISTORY OF URINARY (TRACT) INFECTIONS: ICD-10-CM

## 2023-01-01 DIAGNOSIS — Z12.31 ENCOUNTER FOR SCREENING MAMMOGRAM FOR BREAST CANCER: ICD-10-CM

## 2023-01-01 DIAGNOSIS — K74.60 HEPATIC CIRRHOSIS (H): Primary | ICD-10-CM

## 2023-01-01 DIAGNOSIS — E78.5 HYPERLIPIDEMIA LDL GOAL <100: ICD-10-CM

## 2023-01-01 DIAGNOSIS — F11.20 METHADONE MAINTENANCE THERAPY PATIENT (H): ICD-10-CM

## 2023-01-01 LAB
ALBUMIN SERPL BCG-MCNC: 3.9 G/DL (ref 3.5–5.2)
ALBUMIN SERPL BCG-MCNC: 4.1 G/DL (ref 3.5–5.2)
ALBUMIN SERPL BCG-MCNC: 4.5 G/DL (ref 3.5–5.2)
ALBUMIN UR-MCNC: 10 MG/DL
ALBUMIN UR-MCNC: NEGATIVE MG/DL
ALP SERPL-CCNC: 105 U/L (ref 35–104)
ALP SERPL-CCNC: 92 U/L (ref 35–104)
ALP SERPL-CCNC: 96 U/L (ref 35–104)
ALT SERPL W P-5'-P-CCNC: 23 U/L (ref 10–35)
ALT SERPL W P-5'-P-CCNC: 36 U/L (ref 0–50)
ALT SERPL W P-5'-P-CCNC: 42 U/L (ref 0–50)
ANA SER QL IF: NEGATIVE
ANION GAP SERPL CALCULATED.3IONS-SCNC: 10 MMOL/L (ref 7–15)
ANION GAP SERPL CALCULATED.3IONS-SCNC: 16 MMOL/L (ref 7–15)
ANION GAP SERPL CALCULATED.3IONS-SCNC: 8 MMOL/L (ref 7–15)
ANION GAP SERPL CALCULATED.3IONS-SCNC: 9 MMOL/L (ref 7–15)
ANION GAP SERPL CALCULATED.3IONS-SCNC: 9 MMOL/L (ref 7–15)
APPEARANCE UR: ABNORMAL
APPEARANCE UR: CLEAR
APTT PPP: 26 SECONDS (ref 22–38)
AST SERPL W P-5'-P-CCNC: 30 U/L (ref 10–35)
AST SERPL W P-5'-P-CCNC: 32 U/L (ref 0–45)
AST SERPL W P-5'-P-CCNC: 32 U/L (ref 0–45)
ATRIAL RATE - MUSE: 74 BPM
BACTERIA #/AREA URNS HPF: ABNORMAL /HPF
BACTERIA #/AREA URNS HPF: ABNORMAL /HPF
BACTERIA BLD CULT: NO GROWTH
BACTERIA BLD CULT: NO GROWTH
BACTERIA UR CULT: ABNORMAL
BACTERIA UR CULT: NORMAL
BASOPHILS # BLD AUTO: 0 10E3/UL (ref 0–0.2)
BASOPHILS # BLD AUTO: 0.1 10E3/UL (ref 0–0.2)
BASOPHILS NFR BLD AUTO: 0 %
BASOPHILS NFR BLD AUTO: 1 %
BILIRUB SERPL-MCNC: 0.2 MG/DL
BILIRUB SERPL-MCNC: 0.4 MG/DL
BILIRUB SERPL-MCNC: 0.6 MG/DL
BILIRUB UR QL STRIP: NEGATIVE
BILIRUB UR QL STRIP: NEGATIVE
BUN SERPL-MCNC: 16.5 MG/DL (ref 8–23)
BUN SERPL-MCNC: 22.7 MG/DL (ref 8–23)
BUN SERPL-MCNC: 25.2 MG/DL (ref 8–23)
BUN SERPL-MCNC: 25.2 MG/DL (ref 8–23)
BUN SERPL-MCNC: 30.5 MG/DL (ref 8–23)
CA-I BLD-MCNC: 5 MG/DL (ref 4.4–5.2)
CALCIUM SERPL-MCNC: 10.1 MG/DL (ref 8.8–10.2)
CALCIUM SERPL-MCNC: 10.3 MG/DL (ref 8.8–10.2)
CALCIUM SERPL-MCNC: 10.7 MG/DL (ref 8.8–10.2)
CALCIUM SERPL-MCNC: 11.6 MG/DL (ref 8.8–10.2)
CALCIUM SERPL-MCNC: 9.7 MG/DL (ref 8.8–10.2)
CHLORIDE SERPL-SCNC: 104 MMOL/L (ref 98–107)
CHLORIDE SERPL-SCNC: 94 MMOL/L (ref 98–107)
CHLORIDE SERPL-SCNC: 94 MMOL/L (ref 98–107)
CHLORIDE SERPL-SCNC: 95 MMOL/L (ref 98–107)
CHLORIDE SERPL-SCNC: 96 MMOL/L (ref 98–107)
COLOR UR AUTO: ABNORMAL
COLOR UR AUTO: YELLOW
CREAT BLD-MCNC: 0.9 MG/DL (ref 0.5–1)
CREAT BLD-MCNC: 1.2 MG/DL (ref 0.5–1)
CREAT SERPL-MCNC: 0.86 MG/DL (ref 0.51–0.95)
CREAT SERPL-MCNC: 0.94 MG/DL (ref 0.51–0.95)
CREAT SERPL-MCNC: 1.04 MG/DL (ref 0.51–0.95)
CREAT SERPL-MCNC: 1.05 MG/DL (ref 0.51–0.95)
CREAT SERPL-MCNC: 1.07 MG/DL (ref 0.51–0.95)
CRP SERPL-MCNC: 5.62 MG/L
DEPRECATED CALCIDIOL+CALCIFEROL SERPL-MC: <40 UG/L (ref 20–75)
DEPRECATED HCO3 PLAS-SCNC: 24 MMOL/L (ref 22–29)
DEPRECATED HCO3 PLAS-SCNC: 27 MMOL/L (ref 22–29)
DEPRECATED HCO3 PLAS-SCNC: 29 MMOL/L (ref 22–29)
DEPRECATED HCO3 PLAS-SCNC: 29 MMOL/L (ref 22–29)
DEPRECATED HCO3 PLAS-SCNC: 33 MMOL/L (ref 22–29)
DIASTOLIC BLOOD PRESSURE - MUSE: NORMAL MMHG
EGFRCR SERPLBLD CKD-EPI 2021: 50 ML/MIN/1.73M2
EGFRCR SERPLBLD CKD-EPI 2021: 58 ML/MIN/1.73M2
EGFRCR SERPLBLD CKD-EPI 2021: 59 ML/MIN/1.73M2
EGFRCR SERPLBLD CKD-EPI 2021: 67 ML/MIN/1.73M2
EGFRCR SERPLBLD CKD-EPI 2021: 74 ML/MIN/1.73M2
EOSINOPHIL # BLD AUTO: 0 10E3/UL (ref 0–0.7)
EOSINOPHIL # BLD AUTO: 0.1 10E3/UL (ref 0–0.7)
EOSINOPHIL # BLD AUTO: 0.2 10E3/UL (ref 0–0.7)
EOSINOPHIL # BLD AUTO: 0.3 10E3/UL (ref 0–0.7)
EOSINOPHIL NFR BLD AUTO: 0 %
EOSINOPHIL NFR BLD AUTO: 1 %
EOSINOPHIL NFR BLD AUTO: 2 %
EOSINOPHIL NFR BLD AUTO: 5 %
ERYTHROCYTE [DISTWIDTH] IN BLOOD BY AUTOMATED COUNT: 14.6 % (ref 10–15)
ERYTHROCYTE [DISTWIDTH] IN BLOOD BY AUTOMATED COUNT: 15.2 % (ref 10–15)
ERYTHROCYTE [DISTWIDTH] IN BLOOD BY AUTOMATED COUNT: 15.5 % (ref 10–15)
ERYTHROCYTE [DISTWIDTH] IN BLOOD BY AUTOMATED COUNT: 15.6 % (ref 10–15)
ERYTHROCYTE [DISTWIDTH] IN BLOOD BY AUTOMATED COUNT: 15.7 % (ref 10–15)
FERRITIN SERPL-MCNC: 58 NG/ML (ref 11–328)
FOLATE SERPL-MCNC: 16.9 NG/ML (ref 4.6–34.8)
GFR SERPL CREATININE-BSD FRML MDRD: 57 ML/MIN/1.73M2
GFR SERPL CREATININE-BSD FRML MDRD: >60 ML/MIN/1.73M2
GLUCOSE BLDC GLUCOMTR-MCNC: 104 MG/DL (ref 70–99)
GLUCOSE BLDC GLUCOMTR-MCNC: 109 MG/DL (ref 70–99)
GLUCOSE BLDC GLUCOMTR-MCNC: 110 MG/DL (ref 70–99)
GLUCOSE BLDC GLUCOMTR-MCNC: 149 MG/DL (ref 70–99)
GLUCOSE BLDC GLUCOMTR-MCNC: 153 MG/DL (ref 70–99)
GLUCOSE BLDC GLUCOMTR-MCNC: 164 MG/DL (ref 70–99)
GLUCOSE BLDC GLUCOMTR-MCNC: 77 MG/DL (ref 70–99)
GLUCOSE BLDC GLUCOMTR-MCNC: 79 MG/DL (ref 70–99)
GLUCOSE BLDC GLUCOMTR-MCNC: 86 MG/DL (ref 70–99)
GLUCOSE BLDC GLUCOMTR-MCNC: 89 MG/DL (ref 70–99)
GLUCOSE BLDC GLUCOMTR-MCNC: 90 MG/DL (ref 70–99)
GLUCOSE BLDC GLUCOMTR-MCNC: 97 MG/DL (ref 70–99)
GLUCOSE SERPL-MCNC: 110 MG/DL (ref 70–99)
GLUCOSE SERPL-MCNC: 116 MG/DL (ref 70–99)
GLUCOSE SERPL-MCNC: 164 MG/DL (ref 70–99)
GLUCOSE SERPL-MCNC: 181 MG/DL (ref 70–99)
GLUCOSE SERPL-MCNC: 92 MG/DL (ref 70–99)
GLUCOSE UR STRIP-MCNC: NEGATIVE MG/DL
GLUCOSE UR STRIP-MCNC: NEGATIVE MG/DL
HBA1C MFR BLD: 6.2 %
HCO3 BLDV-SCNC: 27 MMOL/L (ref 21–28)
HCT VFR BLD AUTO: 40.9 % (ref 35–47)
HCT VFR BLD AUTO: 42.9 % (ref 35–47)
HCT VFR BLD AUTO: 43.1 % (ref 35–47)
HCT VFR BLD AUTO: 43.8 % (ref 35–47)
HCT VFR BLD AUTO: 49.9 % (ref 35–47)
HGB BLD-MCNC: 12.6 G/DL (ref 11.7–15.7)
HGB BLD-MCNC: 13.3 G/DL (ref 11.7–15.7)
HGB BLD-MCNC: 13.7 G/DL (ref 11.7–15.7)
HGB BLD-MCNC: 14.2 G/DL (ref 11.7–15.7)
HGB BLD-MCNC: 16.2 G/DL (ref 11.7–15.7)
HGB UR QL STRIP: ABNORMAL
HGB UR QL STRIP: NEGATIVE
IMM GRANULOCYTES # BLD: 0 10E3/UL
IMM GRANULOCYTES # BLD: 0.1 10E3/UL
IMM GRANULOCYTES NFR BLD: 0 %
INR PPP: 0.99 (ref 0.85–1.15)
INTERPRETATION ECG - MUSE: NORMAL
KETONES UR STRIP-MCNC: ABNORMAL MG/DL
KETONES UR STRIP-MCNC: NEGATIVE MG/DL
LACTATE BLD-SCNC: 2.5 MMOL/L
LACTATE SERPL-SCNC: 1.7 MMOL/L (ref 0.7–2)
LEUKOCYTE ESTERASE UR QL STRIP: ABNORMAL
LEUKOCYTE ESTERASE UR QL STRIP: ABNORMAL
LYMPHOCYTES # BLD AUTO: 0.8 10E3/UL (ref 0.8–5.3)
LYMPHOCYTES # BLD AUTO: 1.5 10E3/UL (ref 0.8–5.3)
LYMPHOCYTES # BLD AUTO: 1.6 10E3/UL (ref 0.8–5.3)
LYMPHOCYTES # BLD AUTO: 1.7 10E3/UL (ref 0.8–5.3)
LYMPHOCYTES NFR BLD AUTO: 16 %
LYMPHOCYTES NFR BLD AUTO: 18 %
LYMPHOCYTES NFR BLD AUTO: 25 %
LYMPHOCYTES NFR BLD AUTO: 7 %
MCH RBC QN AUTO: 25.2 PG (ref 26.5–33)
MCH RBC QN AUTO: 25.7 PG (ref 26.5–33)
MCH RBC QN AUTO: 26 PG (ref 26.5–33)
MCH RBC QN AUTO: 26.1 PG (ref 26.5–33)
MCH RBC QN AUTO: 26.1 PG (ref 26.5–33)
MCHC RBC AUTO-ENTMCNC: 30.8 G/DL (ref 31.5–36.5)
MCHC RBC AUTO-ENTMCNC: 31 G/DL (ref 31.5–36.5)
MCHC RBC AUTO-ENTMCNC: 31.8 G/DL (ref 31.5–36.5)
MCHC RBC AUTO-ENTMCNC: 32.4 G/DL (ref 31.5–36.5)
MCHC RBC AUTO-ENTMCNC: 32.5 G/DL (ref 31.5–36.5)
MCV RBC AUTO: 80 FL (ref 78–100)
MCV RBC AUTO: 80 FL (ref 78–100)
MCV RBC AUTO: 81 FL (ref 78–100)
MCV RBC AUTO: 82 FL (ref 78–100)
MCV RBC AUTO: 83 FL (ref 78–100)
MONOCYTES # BLD AUTO: 0.4 10E3/UL (ref 0–1.3)
MONOCYTES # BLD AUTO: 0.5 10E3/UL (ref 0–1.3)
MONOCYTES # BLD AUTO: 0.6 10E3/UL (ref 0–1.3)
MONOCYTES # BLD AUTO: 0.8 10E3/UL (ref 0–1.3)
MONOCYTES NFR BLD AUTO: 3 %
MONOCYTES NFR BLD AUTO: 7 %
MONOCYTES NFR BLD AUTO: 8 %
MONOCYTES NFR BLD AUTO: 8 %
MUCOUS THREADS #/AREA URNS LPF: PRESENT /LPF
NEUTROPHILS # BLD AUTO: 11.6 10E3/UL (ref 1.6–8.3)
NEUTROPHILS # BLD AUTO: 3.7 10E3/UL (ref 1.6–8.3)
NEUTROPHILS # BLD AUTO: 6.6 10E3/UL (ref 1.6–8.3)
NEUTROPHILS # BLD AUTO: 6.9 10E3/UL (ref 1.6–8.3)
NEUTROPHILS NFR BLD AUTO: 61 %
NEUTROPHILS NFR BLD AUTO: 73 %
NEUTROPHILS NFR BLD AUTO: 74 %
NEUTROPHILS NFR BLD AUTO: 89 %
NITRATE UR QL: NEGATIVE
NITRATE UR QL: NEGATIVE
NRBC # BLD AUTO: 0 10E3/UL
NRBC BLD AUTO-RTO: 0 /100
P AXIS - MUSE: 31 DEGREES
PCO2 BLDV: 36 MM HG (ref 40–50)
PH BLDV: 7.48 [PH] (ref 7.32–7.43)
PH UR STRIP: 5.5 [PH] (ref 5–7)
PH UR STRIP: 7.5 [PH] (ref 5–7)
PLATELET # BLD AUTO: 208 10E3/UL (ref 150–450)
PLATELET # BLD AUTO: 233 10E3/UL (ref 150–450)
PLATELET # BLD AUTO: 282 10E3/UL (ref 150–450)
PLATELET # BLD AUTO: 292 10E3/UL (ref 150–450)
PLATELET # BLD AUTO: 296 10E3/UL (ref 150–450)
PO2 BLDV: 27 MM HG (ref 25–47)
POTASSIUM SERPL-SCNC: 4.2 MMOL/L (ref 3.4–5.3)
POTASSIUM SERPL-SCNC: 4.2 MMOL/L (ref 3.4–5.3)
POTASSIUM SERPL-SCNC: 4.3 MMOL/L (ref 3.4–5.3)
POTASSIUM SERPL-SCNC: 4.4 MMOL/L (ref 3.4–5.3)
POTASSIUM SERPL-SCNC: 4.5 MMOL/L (ref 3.4–5.3)
PR INTERVAL - MUSE: 150 MS
PROT SERPL-MCNC: 7.2 G/DL (ref 6.4–8.3)
PROT SERPL-MCNC: 7.3 G/DL (ref 6.4–8.3)
PROT SERPL-MCNC: 8.3 G/DL (ref 6.4–8.3)
PTH RELATED PROT SERPL-SCNC: 2 PMOL/L
PTH-INTACT SERPL-MCNC: 55 PG/ML (ref 15–65)
QRS DURATION - MUSE: 126 MS
QT - MUSE: 372 MS
QTC - MUSE: 412 MS
R AXIS - MUSE: -28 DEGREES
RBC # BLD AUTO: 4.91 10E6/UL (ref 3.8–5.2)
RBC # BLD AUTO: 5.25 10E6/UL (ref 3.8–5.2)
RBC # BLD AUTO: 5.28 10E6/UL (ref 3.8–5.2)
RBC # BLD AUTO: 5.45 10E6/UL (ref 3.8–5.2)
RBC # BLD AUTO: 6.22 10E6/UL (ref 3.8–5.2)
RBC URINE: 15 /HPF
RBC URINE: 5 /HPF
RETINOPATHY: NORMAL
RETINOPATHY: POSITIVE
SAO2 % BLDV: 56 % (ref 94–100)
SMA IGG SER-ACNC: 6 UNITS
SODIUM SERPL-SCNC: 133 MMOL/L (ref 135–145)
SODIUM SERPL-SCNC: 134 MMOL/L (ref 135–145)
SODIUM SERPL-SCNC: 135 MMOL/L (ref 135–145)
SODIUM SERPL-SCNC: 136 MMOL/L (ref 136–145)
SODIUM SERPL-SCNC: 139 MMOL/L (ref 135–145)
SP GR UR STRIP: 1.01 (ref 1–1.03)
SP GR UR STRIP: 1.01 (ref 1–1.03)
SQUAMOUS EPITHELIAL: <1 /HPF
SYSTOLIC BLOOD PRESSURE - MUSE: NORMAL MMHG
T AXIS - MUSE: 72 DEGREES
TSH SERPL DL<=0.005 MIU/L-ACNC: 0.4 UIU/ML (ref 0.3–4.2)
UROBILINOGEN UR STRIP-MCNC: NORMAL MG/DL
UROBILINOGEN UR STRIP-MCNC: NORMAL MG/DL
VENTRICULAR RATE- MUSE: 74 BPM
VIT B12 SERPL-MCNC: 1388 PG/ML (ref 232–1245)
VITAMIN D2 SERPL-MCNC: <5 UG/L
VITAMIN D3 SERPL-MCNC: 35 UG/L
WBC # BLD AUTO: 13 10E3/UL (ref 4–11)
WBC # BLD AUTO: 6.1 10E3/UL (ref 4–11)
WBC # BLD AUTO: 9.2 10E3/UL (ref 4–11)
WBC # BLD AUTO: 9.2 10E3/UL (ref 4–11)
WBC # BLD AUTO: 9.4 10E3/UL (ref 4–11)
WBC URINE: 16 /HPF
WBC URINE: 9 /HPF

## 2023-01-01 PROCEDURE — 85027 COMPLETE CBC AUTOMATED: CPT | Performed by: PHYSICIAN ASSISTANT

## 2023-01-01 PROCEDURE — 250N000009 HC RX 250: Performed by: INTERNAL MEDICINE

## 2023-01-01 PROCEDURE — 80053 COMPREHEN METABOLIC PANEL: CPT | Performed by: INTERNAL MEDICINE

## 2023-01-01 PROCEDURE — 36415 COLL VENOUS BLD VENIPUNCTURE: CPT | Performed by: INTERNAL MEDICINE

## 2023-01-01 PROCEDURE — C1769 GUIDE WIRE: HCPCS | Performed by: STUDENT IN AN ORGANIZED HEALTH CARE EDUCATION/TRAINING PROGRAM

## 2023-01-01 PROCEDURE — 250N000013 HC RX MED GY IP 250 OP 250 PS 637: Performed by: STUDENT IN AN ORGANIZED HEALTH CARE EDUCATION/TRAINING PROGRAM

## 2023-01-01 PROCEDURE — 82607 VITAMIN B-12: CPT | Performed by: INTERNAL MEDICINE

## 2023-01-01 PROCEDURE — 258N000001 HC RX 258: Performed by: STUDENT IN AN ORGANIZED HEALTH CARE EDUCATION/TRAINING PROGRAM

## 2023-01-01 PROCEDURE — 86038 ANTINUCLEAR ANTIBODIES: CPT | Performed by: INTERNAL MEDICINE

## 2023-01-01 PROCEDURE — 258N000003 HC RX IP 258 OP 636: Performed by: EMERGENCY MEDICINE

## 2023-01-01 PROCEDURE — 99233 SBSQ HOSP IP/OBS HIGH 50: CPT | Performed by: INTERNAL MEDICINE

## 2023-01-01 PROCEDURE — 250N000013 HC RX MED GY IP 250 OP 250 PS 637: Performed by: PHYSICIAN ASSISTANT

## 2023-01-01 PROCEDURE — 99232 SBSQ HOSP IP/OBS MODERATE 35: CPT | Performed by: INTERNAL MEDICINE

## 2023-01-01 PROCEDURE — 99205 OFFICE O/P NEW HI 60 MIN: CPT | Mod: VID | Performed by: PHYSICIAN ASSISTANT

## 2023-01-01 PROCEDURE — 52332 CYSTOSCOPY AND TREATMENT: CPT | Mod: LT | Performed by: STUDENT IN AN ORGANIZED HEALTH CARE EDUCATION/TRAINING PROGRAM

## 2023-01-01 PROCEDURE — 250N000013 HC RX MED GY IP 250 OP 250 PS 637: Performed by: EMERGENCY MEDICINE

## 2023-01-01 PROCEDURE — 999N000179 XR SURGERY CARM FLUORO LESS THAN 5 MIN W STILLS: Mod: TC

## 2023-01-01 PROCEDURE — 87040 BLOOD CULTURE FOR BACTERIA: CPT | Performed by: EMERGENCY MEDICINE

## 2023-01-01 PROCEDURE — 82962 GLUCOSE BLOOD TEST: CPT

## 2023-01-01 PROCEDURE — 250N000009 HC RX 250: Performed by: STUDENT IN AN ORGANIZED HEALTH CARE EDUCATION/TRAINING PROGRAM

## 2023-01-01 PROCEDURE — 250N000011 HC RX IP 250 OP 636: Performed by: REGISTERED NURSE

## 2023-01-01 PROCEDURE — 87086 URINE CULTURE/COLONY COUNT: CPT | Performed by: EMERGENCY MEDICINE

## 2023-01-01 PROCEDURE — 250N000009 HC RX 250: Performed by: NURSE PRACTITIONER

## 2023-01-01 PROCEDURE — G0180 MD CERTIFICATION HHA PATIENT: HCPCS | Performed by: INTERNAL MEDICINE

## 2023-01-01 PROCEDURE — 80053 COMPREHEN METABOLIC PANEL: CPT | Performed by: EMERGENCY MEDICINE

## 2023-01-01 PROCEDURE — 36415 COLL VENOUS BLD VENIPUNCTURE: CPT

## 2023-01-01 PROCEDURE — G0179 MD RECERTIFICATION HHA PT: HCPCS | Performed by: INTERNAL MEDICINE

## 2023-01-01 PROCEDURE — 74183 MRI ABD W/O CNTR FLWD CNTR: CPT

## 2023-01-01 PROCEDURE — 96361 HYDRATE IV INFUSION ADD-ON: CPT

## 2023-01-01 PROCEDURE — 99231 SBSQ HOSP IP/OBS SF/LOW 25: CPT | Performed by: NURSE PRACTITIONER

## 2023-01-01 PROCEDURE — 999N000128 HC STATISTIC PERIPHERAL IV START W/O US GUIDANCE

## 2023-01-01 PROCEDURE — 81001 URINALYSIS AUTO W/SCOPE: CPT | Mod: ORL | Performed by: FAMILY MEDICINE

## 2023-01-01 PROCEDURE — 97161 PT EVAL LOW COMPLEX 20 MIN: CPT | Mod: GP

## 2023-01-01 PROCEDURE — 85730 THROMBOPLASTIN TIME PARTIAL: CPT | Performed by: INTERNAL MEDICINE

## 2023-01-01 PROCEDURE — 82803 BLOOD GASES ANY COMBINATION: CPT

## 2023-01-01 PROCEDURE — 250N000011 HC RX IP 250 OP 636: Mod: JZ | Performed by: PHYSICIAN ASSISTANT

## 2023-01-01 PROCEDURE — C1758 CATHETER, URETERAL: HCPCS | Performed by: STUDENT IN AN ORGANIZED HEALTH CARE EDUCATION/TRAINING PROGRAM

## 2023-01-01 PROCEDURE — 74177 CT ABD & PELVIS W/CONTRAST: CPT

## 2023-01-01 PROCEDURE — 85025 COMPLETE CBC W/AUTO DIFF WBC: CPT | Performed by: EMERGENCY MEDICINE

## 2023-01-01 PROCEDURE — 83605 ASSAY OF LACTIC ACID: CPT

## 2023-01-01 PROCEDURE — 86140 C-REACTIVE PROTEIN: CPT | Performed by: INTERNAL MEDICINE

## 2023-01-01 PROCEDURE — 99222 1ST HOSP IP/OBS MODERATE 55: CPT | Performed by: PAIN MEDICINE

## 2023-01-01 PROCEDURE — 250N000013 HC RX MED GY IP 250 OP 250 PS 637: Performed by: INTERNAL MEDICINE

## 2023-01-01 PROCEDURE — 99222 1ST HOSP IP/OBS MODERATE 55: CPT | Mod: 25 | Performed by: STUDENT IN AN ORGANIZED HEALTH CARE EDUCATION/TRAINING PROGRAM

## 2023-01-01 PROCEDURE — 85610 PROTHROMBIN TIME: CPT | Performed by: INTERNAL MEDICINE

## 2023-01-01 PROCEDURE — 97530 THERAPEUTIC ACTIVITIES: CPT | Mod: GP

## 2023-01-01 PROCEDURE — 85041 AUTOMATED RBC COUNT: CPT | Performed by: INTERNAL MEDICINE

## 2023-01-01 PROCEDURE — 250N000011 HC RX IP 250 OP 636: Performed by: STUDENT IN AN ORGANIZED HEALTH CARE EDUCATION/TRAINING PROGRAM

## 2023-01-01 PROCEDURE — 255N000002 HC RX 255 OP 636: Performed by: PHYSICIAN ASSISTANT

## 2023-01-01 PROCEDURE — 80053 COMPREHEN METABOLIC PANEL: CPT

## 2023-01-01 PROCEDURE — 83970 ASSAY OF PARATHORMONE: CPT | Performed by: INTERNAL MEDICINE

## 2023-01-01 PROCEDURE — 99495 TRANSJ CARE MGMT MOD F2F 14D: CPT | Mod: VID | Performed by: INTERNAL MEDICINE

## 2023-01-01 PROCEDURE — 258N000003 HC RX IP 258 OP 636: Performed by: REGISTERED NURSE

## 2023-01-01 PROCEDURE — 96365 THER/PROPH/DIAG IV INF INIT: CPT | Mod: 59

## 2023-01-01 PROCEDURE — 250N000012 HC RX MED GY IP 250 OP 636 PS 637: Performed by: STUDENT IN AN ORGANIZED HEALTH CARE EDUCATION/TRAINING PROGRAM

## 2023-01-01 PROCEDURE — 250N000011 HC RX IP 250 OP 636: Performed by: INTERNAL MEDICINE

## 2023-01-01 PROCEDURE — 74178 CT ABD&PLV WO CNTR FLWD CNTR: CPT

## 2023-01-01 PROCEDURE — 250N000013 HC RX MED GY IP 250 OP 250 PS 637: Performed by: PAIN MEDICINE

## 2023-01-01 PROCEDURE — 99214 OFFICE O/P EST MOD 30 MIN: CPT | Mod: VID | Performed by: UROLOGY

## 2023-01-01 PROCEDURE — 99239 HOSP IP/OBS DSCHRG MGMT >30: CPT | Performed by: INTERNAL MEDICINE

## 2023-01-01 PROCEDURE — 258N000003 HC RX IP 258 OP 636: Performed by: INTERNAL MEDICINE

## 2023-01-01 PROCEDURE — 360N000075 HC SURGERY LEVEL 2, PER MIN: Performed by: STUDENT IN AN ORGANIZED HEALTH CARE EDUCATION/TRAINING PROGRAM

## 2023-01-01 PROCEDURE — 250N000025 HC SEVOFLURANE, PER MIN: Performed by: STUDENT IN AN ORGANIZED HEALTH CARE EDUCATION/TRAINING PROGRAM

## 2023-01-01 PROCEDURE — 82330 ASSAY OF CALCIUM: CPT | Performed by: INTERNAL MEDICINE

## 2023-01-01 PROCEDURE — 120N000001 HC R&B MED SURG/OB

## 2023-01-01 PROCEDURE — 250N000009 HC RX 250: Performed by: REGISTERED NURSE

## 2023-01-01 PROCEDURE — 250N000011 HC RX IP 250 OP 636: Mod: JZ | Performed by: STUDENT IN AN ORGANIZED HEALTH CARE EDUCATION/TRAINING PROGRAM

## 2023-01-01 PROCEDURE — 36415 COLL VENOUS BLD VENIPUNCTURE: CPT | Performed by: PHYSICIAN ASSISTANT

## 2023-01-01 PROCEDURE — 84443 ASSAY THYROID STIM HORMONE: CPT

## 2023-01-01 PROCEDURE — 250N000011 HC RX IP 250 OP 636: Performed by: EMERGENCY MEDICINE

## 2023-01-01 PROCEDURE — 36415 COLL VENOUS BLD VENIPUNCTURE: CPT | Performed by: EMERGENCY MEDICINE

## 2023-01-01 PROCEDURE — 86015 ACTIN ANTIBODY EACH: CPT | Performed by: INTERNAL MEDICINE

## 2023-01-01 PROCEDURE — 87086 URINE CULTURE/COLONY COUNT: CPT

## 2023-01-01 PROCEDURE — 250N000011 HC RX IP 250 OP 636: Mod: JZ | Performed by: EMERGENCY MEDICINE

## 2023-01-01 PROCEDURE — A9585 GADOBUTROL INJECTION: HCPCS | Performed by: PHYSICIAN ASSISTANT

## 2023-01-01 PROCEDURE — 82542 COL CHROMOTOGRAPHY QUAL/QUAN: CPT | Performed by: INTERNAL MEDICINE

## 2023-01-01 PROCEDURE — 99232 SBSQ HOSP IP/OBS MODERATE 35: CPT | Performed by: PHYSICIAN ASSISTANT

## 2023-01-01 PROCEDURE — 87086 URINE CULTURE/COLONY COUNT: CPT | Mod: ORL | Performed by: FAMILY MEDICINE

## 2023-01-01 PROCEDURE — 74420 UROGRAPHY RTRGR +-KUB: CPT | Mod: 26 | Performed by: STUDENT IN AN ORGANIZED HEALTH CARE EDUCATION/TRAINING PROGRAM

## 2023-01-01 PROCEDURE — 272N000001 HC OR GENERAL SUPPLY STERILE: Performed by: STUDENT IN AN ORGANIZED HEALTH CARE EDUCATION/TRAINING PROGRAM

## 2023-01-01 PROCEDURE — 85025 COMPLETE CBC W/AUTO DIFF WBC: CPT | Performed by: INTERNAL MEDICINE

## 2023-01-01 PROCEDURE — 999N000141 HC STATISTIC PRE-PROCEDURE NURSING ASSESSMENT: Performed by: UROLOGY

## 2023-01-01 PROCEDURE — 96375 TX/PRO/DX INJ NEW DRUG ADDON: CPT

## 2023-01-01 PROCEDURE — 82746 ASSAY OF FOLIC ACID SERUM: CPT | Performed by: INTERNAL MEDICINE

## 2023-01-01 PROCEDURE — 99222 1ST HOSP IP/OBS MODERATE 55: CPT | Mod: AI | Performed by: PHYSICIAN ASSISTANT

## 2023-01-01 PROCEDURE — 250N000009 HC RX 250: Performed by: EMERGENCY MEDICINE

## 2023-01-01 PROCEDURE — 85018 HEMOGLOBIN: CPT | Performed by: INTERNAL MEDICINE

## 2023-01-01 PROCEDURE — 999N000141 HC STATISTIC PRE-PROCEDURE NURSING ASSESSMENT: Performed by: STUDENT IN AN ORGANIZED HEALTH CARE EDUCATION/TRAINING PROGRAM

## 2023-01-01 PROCEDURE — 82306 VITAMIN D 25 HYDROXY: CPT | Performed by: INTERNAL MEDICINE

## 2023-01-01 PROCEDURE — 80048 BASIC METABOLIC PNL TOTAL CA: CPT | Performed by: PHYSICIAN ASSISTANT

## 2023-01-01 PROCEDURE — 80048 BASIC METABOLIC PNL TOTAL CA: CPT | Performed by: INTERNAL MEDICINE

## 2023-01-01 PROCEDURE — 0T778DZ DILATION OF LEFT URETER WITH INTRALUMINAL DEVICE, VIA NATURAL OR ARTIFICIAL OPENING ENDOSCOPIC: ICD-10-PCS | Performed by: STUDENT IN AN ORGANIZED HEALTH CARE EDUCATION/TRAINING PROGRAM

## 2023-01-01 PROCEDURE — 83036 HEMOGLOBIN GLYCOSYLATED A1C: CPT

## 2023-01-01 PROCEDURE — 250N000011 HC RX IP 250 OP 636: Mod: JZ | Performed by: INTERNAL MEDICINE

## 2023-01-01 PROCEDURE — 81001 URINALYSIS AUTO W/SCOPE: CPT | Performed by: EMERGENCY MEDICINE

## 2023-01-01 PROCEDURE — 82565 ASSAY OF CREATININE: CPT

## 2023-01-01 PROCEDURE — 99222 1ST HOSP IP/OBS MODERATE 55: CPT | Performed by: PHYSICIAN ASSISTANT

## 2023-01-01 PROCEDURE — 93005 ELECTROCARDIOGRAM TRACING: CPT

## 2023-01-01 PROCEDURE — 96376 TX/PRO/DX INJ SAME DRUG ADON: CPT

## 2023-01-01 PROCEDURE — 370N000017 HC ANESTHESIA TECHNICAL FEE, PER MIN: Performed by: STUDENT IN AN ORGANIZED HEALTH CARE EDUCATION/TRAINING PROGRAM

## 2023-01-01 PROCEDURE — 99214 OFFICE O/P EST MOD 30 MIN: CPT | Mod: VID | Performed by: INTERNAL MEDICINE

## 2023-01-01 PROCEDURE — 82728 ASSAY OF FERRITIN: CPT | Performed by: INTERNAL MEDICINE

## 2023-01-01 PROCEDURE — 85004 AUTOMATED DIFF WBC COUNT: CPT

## 2023-01-01 PROCEDURE — 258N000003 HC RX IP 258 OP 636: Performed by: ANESTHESIOLOGY

## 2023-01-01 PROCEDURE — 999N000001 HC CANCELLED SURGERY UP TO 15 MINS: Performed by: UROLOGY

## 2023-01-01 PROCEDURE — 710N000009 HC RECOVERY PHASE 1, LEVEL 1, PER MIN: Performed by: STUDENT IN AN ORGANIZED HEALTH CARE EDUCATION/TRAINING PROGRAM

## 2023-01-01 PROCEDURE — 250N000011 HC RX IP 250 OP 636: Mod: JZ | Performed by: ANESTHESIOLOGY

## 2023-01-01 PROCEDURE — C2617 STENT, NON-COR, TEM W/O DEL: HCPCS | Performed by: STUDENT IN AN ORGANIZED HEALTH CARE EDUCATION/TRAINING PROGRAM

## 2023-01-01 PROCEDURE — G0179 MD RECERTIFICATION HHA PT: HCPCS | Performed by: FAMILY MEDICINE

## 2023-01-01 PROCEDURE — 99285 EMERGENCY DEPT VISIT HI MDM: CPT | Mod: 25

## 2023-01-01 PROCEDURE — 255N000002 HC RX 255 OP 636: Mod: JZ | Performed by: STUDENT IN AN ORGANIZED HEALTH CARE EDUCATION/TRAINING PROGRAM

## 2023-01-01 DEVICE — URETERAL STENT
Type: IMPLANTABLE DEVICE | Site: URETER | Status: NON-FUNCTIONAL
Brand: POLARIS™ ULTRA
Removed: 2024-01-05

## 2023-01-01 RX ORDER — HYDROMORPHONE HYDROCHLORIDE 1 MG/ML
0.5 INJECTION, SOLUTION INTRAMUSCULAR; INTRAVENOUS; SUBCUTANEOUS ONCE
Status: COMPLETED | OUTPATIENT
Start: 2023-01-01 | End: 2023-01-01

## 2023-01-01 RX ORDER — ONDANSETRON 4 MG/1
4 TABLET, ORALLY DISINTEGRATING ORAL EVERY 30 MIN PRN
Status: DISCONTINUED | OUTPATIENT
Start: 2023-01-01 | End: 2023-01-01 | Stop reason: HOSPADM

## 2023-01-01 RX ORDER — AMOXICILLIN 250 MG
2 CAPSULE ORAL 2 TIMES DAILY
Status: DISCONTINUED | OUTPATIENT
Start: 2023-01-01 | End: 2023-01-01 | Stop reason: HOSPADM

## 2023-01-01 RX ORDER — ROSUVASTATIN CALCIUM 20 MG/1
20 TABLET, COATED ORAL DAILY
Qty: 90 TABLET | Refills: 3 | Status: SHIPPED | OUTPATIENT
Start: 2023-01-01

## 2023-01-01 RX ORDER — FENTANYL CITRATE 0.05 MG/ML
25 INJECTION, SOLUTION INTRAMUSCULAR; INTRAVENOUS EVERY 5 MIN PRN
Status: DISCONTINUED | OUTPATIENT
Start: 2023-01-01 | End: 2023-01-01 | Stop reason: HOSPADM

## 2023-01-01 RX ORDER — IOPAMIDOL 755 MG/ML
100 INJECTION, SOLUTION INTRAVASCULAR ONCE
Status: COMPLETED | OUTPATIENT
Start: 2023-01-01 | End: 2023-01-01

## 2023-01-01 RX ORDER — MAGNESIUM HYDROXIDE 1200 MG/15ML
LIQUID ORAL PRN
Status: DISCONTINUED | OUTPATIENT
Start: 2023-01-01 | End: 2023-01-01 | Stop reason: HOSPADM

## 2023-01-01 RX ORDER — CEFAZOLIN SODIUM/WATER 2 G/20 ML
2 SYRINGE (ML) INTRAVENOUS
Status: DISCONTINUED | OUTPATIENT
Start: 2023-01-01 | End: 2023-01-01 | Stop reason: HOSPADM

## 2023-01-01 RX ORDER — ACETAMINOPHEN 325 MG/1
975 TABLET ORAL 3 TIMES DAILY
Status: DISCONTINUED | OUTPATIENT
Start: 2023-01-01 | End: 2023-01-01

## 2023-01-01 RX ORDER — LISINOPRIL 2.5 MG/1
2.5 TABLET ORAL DAILY
Qty: 90 TABLET | Refills: 0 | Status: SHIPPED | OUTPATIENT
Start: 2023-01-01

## 2023-01-01 RX ORDER — HYDROCODONE BITARTRATE AND ACETAMINOPHEN 5; 325 MG/1; MG/1
1 TABLET ORAL EVERY 6 HOURS PRN
Qty: 4 TABLET | Refills: 0 | Status: SHIPPED | OUTPATIENT
Start: 2023-01-01 | End: 2023-01-01

## 2023-01-01 RX ORDER — PHENAZOPYRIDINE HYDROCHLORIDE 100 MG/1
100 TABLET, FILM COATED ORAL
Status: DISCONTINUED | OUTPATIENT
Start: 2023-01-01 | End: 2023-01-01 | Stop reason: HOSPADM

## 2023-01-01 RX ORDER — BACLOFEN 10 MG/1
10 TABLET ORAL DAILY PRN
COMMUNITY
End: 2023-01-01

## 2023-01-01 RX ORDER — OXYBUTYNIN CHLORIDE 15 MG/1
15 TABLET, EXTENDED RELEASE ORAL DAILY
Qty: 30 TABLET | Refills: 8 | Status: SHIPPED | OUTPATIENT
Start: 2023-01-01

## 2023-01-01 RX ORDER — ROSUVASTATIN CALCIUM 20 MG/1
20 TABLET, COATED ORAL DAILY
Status: DISCONTINUED | OUTPATIENT
Start: 2023-01-01 | End: 2023-01-01 | Stop reason: HOSPADM

## 2023-01-01 RX ORDER — NALOXONE HYDROCHLORIDE 0.4 MG/ML
0.4 INJECTION, SOLUTION INTRAMUSCULAR; INTRAVENOUS; SUBCUTANEOUS
Status: DISCONTINUED | OUTPATIENT
Start: 2023-01-01 | End: 2023-01-01 | Stop reason: HOSPADM

## 2023-01-01 RX ORDER — LANCETS
EACH MISCELLANEOUS
Qty: 100 EACH | Refills: 6 | Status: SHIPPED | OUTPATIENT
Start: 2023-01-01 | End: 2023-01-01

## 2023-01-01 RX ORDER — LEVOTHYROXINE SODIUM 88 UG/1
88 TABLET ORAL DAILY
Status: DISCONTINUED | OUTPATIENT
Start: 2023-01-01 | End: 2023-01-01 | Stop reason: HOSPADM

## 2023-01-01 RX ORDER — FLUTICASONE FUROATE AND VILANTEROL 100; 25 UG/1; UG/1
1 POWDER RESPIRATORY (INHALATION) DAILY
Status: DISCONTINUED | OUTPATIENT
Start: 2023-01-01 | End: 2023-01-01 | Stop reason: HOSPADM

## 2023-01-01 RX ORDER — ONDANSETRON 2 MG/ML
4 INJECTION INTRAMUSCULAR; INTRAVENOUS EVERY 6 HOURS PRN
Status: DISCONTINUED | OUTPATIENT
Start: 2023-01-01 | End: 2023-01-01 | Stop reason: HOSPADM

## 2023-01-01 RX ORDER — LEVOTHYROXINE SODIUM 88 UG/1
TABLET ORAL
Qty: 30 TABLET | Refills: 2 | Status: SHIPPED | OUTPATIENT
Start: 2023-01-01 | End: 2023-01-01

## 2023-01-01 RX ORDER — HYDROMORPHONE HYDROCHLORIDE 1 MG/ML
0.5 INJECTION, SOLUTION INTRAMUSCULAR; INTRAVENOUS; SUBCUTANEOUS EVERY 30 MIN PRN
Status: COMPLETED | OUTPATIENT
Start: 2023-01-01 | End: 2023-01-01

## 2023-01-01 RX ORDER — ONDANSETRON 4 MG/1
4 TABLET, ORALLY DISINTEGRATING ORAL EVERY 6 HOURS PRN
Status: DISCONTINUED | OUTPATIENT
Start: 2023-01-01 | End: 2023-01-01 | Stop reason: HOSPADM

## 2023-01-01 RX ORDER — LEVOTHYROXINE SODIUM 88 UG/1
88 TABLET ORAL DAILY
Qty: 90 TABLET | Refills: 0 | Status: SHIPPED | OUTPATIENT
Start: 2023-01-01

## 2023-01-01 RX ORDER — CIPROFLOXACIN 500 MG/1
500 TABLET, FILM COATED ORAL DAILY
Qty: 3 TABLET | Refills: 0 | Status: SHIPPED | OUTPATIENT
Start: 2023-01-01 | End: 2023-01-01

## 2023-01-01 RX ORDER — PROPOFOL 10 MG/ML
INJECTION, EMULSION INTRAVENOUS CONTINUOUS PRN
Status: DISCONTINUED | OUTPATIENT
Start: 2023-01-01 | End: 2023-01-01

## 2023-01-01 RX ORDER — SEMAGLUTIDE 0.68 MG/ML
INJECTION, SOLUTION SUBCUTANEOUS
Qty: 3 ML | Refills: 1 | Status: SHIPPED | OUTPATIENT
Start: 2023-01-01

## 2023-01-01 RX ORDER — BACLOFEN 10 MG/1
10 TABLET ORAL 3 TIMES DAILY PRN
Status: DISCONTINUED | OUTPATIENT
Start: 2023-01-01 | End: 2023-01-01 | Stop reason: HOSPADM

## 2023-01-01 RX ORDER — ONDANSETRON 2 MG/ML
4 INJECTION INTRAMUSCULAR; INTRAVENOUS EVERY 30 MIN PRN
Status: DISCONTINUED | OUTPATIENT
Start: 2023-01-01 | End: 2023-01-01 | Stop reason: HOSPADM

## 2023-01-01 RX ORDER — FLUTICASONE FUROATE AND VILANTEROL TRIFENATATE 100; 25 UG/1; UG/1
1 POWDER RESPIRATORY (INHALATION) DAILY
Qty: 60 EACH | Refills: 1 | Status: SHIPPED | OUTPATIENT
Start: 2023-01-01

## 2023-01-01 RX ORDER — HYDROMORPHONE HCL IN WATER/PF 6 MG/30 ML
0.2 PATIENT CONTROLLED ANALGESIA SYRINGE INTRAVENOUS
Status: DISCONTINUED | OUTPATIENT
Start: 2023-01-01 | End: 2023-01-01

## 2023-01-01 RX ORDER — METHOCARBAMOL 500 MG/1
500 TABLET, FILM COATED ORAL 4 TIMES DAILY PRN
Status: DISCONTINUED | OUTPATIENT
Start: 2023-01-01 | End: 2023-01-01

## 2023-01-01 RX ORDER — IOPAMIDOL 755 MG/ML
111 INJECTION, SOLUTION INTRAVASCULAR ONCE
Status: COMPLETED | OUTPATIENT
Start: 2023-01-01 | End: 2023-01-01

## 2023-01-01 RX ORDER — LIDOCAINE 50 MG/G
OINTMENT TOPICAL EVERY 4 HOURS PRN
Qty: 50 G | Refills: 0 | Status: SHIPPED | OUTPATIENT
Start: 2023-01-01

## 2023-01-01 RX ORDER — SULFAMETHOXAZOLE/TRIMETHOPRIM 800-160 MG
1 TABLET ORAL 2 TIMES DAILY
Qty: 14 TABLET | Refills: 0 | Status: SHIPPED | OUTPATIENT
Start: 2023-01-01 | End: 2023-01-01

## 2023-01-01 RX ORDER — ALBUTEROL SULFATE 0.83 MG/ML
2.5 SOLUTION RESPIRATORY (INHALATION) EVERY 4 HOURS PRN
Status: DISCONTINUED | OUTPATIENT
Start: 2023-01-01 | End: 2023-01-01 | Stop reason: HOSPADM

## 2023-01-01 RX ORDER — CEFAZOLIN SODIUM/WATER 2 G/20 ML
2 SYRINGE (ML) INTRAVENOUS
Status: COMPLETED | OUTPATIENT
Start: 2023-01-01 | End: 2023-01-01

## 2023-01-01 RX ORDER — SEMAGLUTIDE 0.68 MG/ML
INJECTION, SOLUTION SUBCUTANEOUS
Qty: 3 ML | Refills: 0 | OUTPATIENT
Start: 2023-01-01

## 2023-01-01 RX ORDER — PROPOFOL 10 MG/ML
INJECTION, EMULSION INTRAVENOUS PRN
Status: DISCONTINUED | OUTPATIENT
Start: 2023-01-01 | End: 2023-01-01

## 2023-01-01 RX ORDER — FUROSEMIDE 40 MG
40 TABLET ORAL EVERY EVENING
COMMUNITY

## 2023-01-01 RX ORDER — GABAPENTIN 800 MG/1
1600 TABLET ORAL 3 TIMES DAILY
Status: DISCONTINUED | OUTPATIENT
Start: 2023-01-01 | End: 2023-01-01 | Stop reason: HOSPADM

## 2023-01-01 RX ORDER — LIDOCAINE 50 MG/G
OINTMENT TOPICAL EVERY 4 HOURS PRN
Status: DISCONTINUED | OUTPATIENT
Start: 2023-01-01 | End: 2023-01-01 | Stop reason: HOSPADM

## 2023-01-01 RX ORDER — FENTANYL CITRATE 0.05 MG/ML
50 INJECTION, SOLUTION INTRAMUSCULAR; INTRAVENOUS EVERY 5 MIN PRN
Status: DISCONTINUED | OUTPATIENT
Start: 2023-01-01 | End: 2023-01-01 | Stop reason: HOSPADM

## 2023-01-01 RX ORDER — GABAPENTIN 800 MG/1
1600 TABLET ORAL 3 TIMES DAILY
Qty: 18 TABLET | Refills: 0 | Status: SHIPPED | OUTPATIENT
Start: 2023-01-01 | End: 2023-01-01

## 2023-01-01 RX ORDER — ALBUTEROL SULFATE 90 UG/1
2 AEROSOL, METERED RESPIRATORY (INHALATION) EVERY 6 HOURS PRN
Status: DISCONTINUED | OUTPATIENT
Start: 2023-01-01 | End: 2023-01-01 | Stop reason: HOSPADM

## 2023-01-01 RX ORDER — NICOTINE POLACRILEX 4 MG
15-30 LOZENGE BUCCAL
Status: DISCONTINUED | OUTPATIENT
Start: 2023-01-01 | End: 2023-01-01 | Stop reason: HOSPADM

## 2023-01-01 RX ORDER — LIDOCAINE HYDROCHLORIDE 20 MG/ML
INJECTION, SOLUTION INFILTRATION; PERINEURAL PRN
Status: DISCONTINUED | OUTPATIENT
Start: 2023-01-01 | End: 2023-01-01

## 2023-01-01 RX ORDER — DEXAMETHASONE SODIUM PHOSPHATE 4 MG/ML
INJECTION, SOLUTION INTRA-ARTICULAR; INTRALESIONAL; INTRAMUSCULAR; INTRAVENOUS; SOFT TISSUE PRN
Status: DISCONTINUED | OUTPATIENT
Start: 2023-01-01 | End: 2023-01-01

## 2023-01-01 RX ORDER — SODIUM CHLORIDE, SODIUM LACTATE, POTASSIUM CHLORIDE, CALCIUM CHLORIDE 600; 310; 30; 20 MG/100ML; MG/100ML; MG/100ML; MG/100ML
INJECTION, SOLUTION INTRAVENOUS CONTINUOUS
Status: DISCONTINUED | OUTPATIENT
Start: 2023-01-01 | End: 2023-01-01 | Stop reason: HOSPADM

## 2023-01-01 RX ORDER — FLUTICASONE FUROATE AND VILANTEROL 100; 25 UG/1; UG/1
POWDER RESPIRATORY (INHALATION)
Qty: 60 EACH | Refills: 3 | Status: SHIPPED | OUTPATIENT
Start: 2023-01-01 | End: 2023-01-01

## 2023-01-01 RX ORDER — ROSUVASTATIN CALCIUM 40 MG/1
40 TABLET, COATED ORAL DAILY
Qty: 90 TABLET | Refills: 3 | Status: SHIPPED | OUTPATIENT
Start: 2023-01-01 | End: 2023-01-01

## 2023-01-01 RX ORDER — CARBOXYMETHYLCELLULOSE SODIUM 5 MG/ML
1 SOLUTION/ DROPS OPHTHALMIC
Status: DISCONTINUED | OUTPATIENT
Start: 2023-01-01 | End: 2023-01-01 | Stop reason: HOSPADM

## 2023-01-01 RX ORDER — CEFTRIAXONE 2 G/1
2 INJECTION, POWDER, FOR SOLUTION INTRAMUSCULAR; INTRAVENOUS EVERY 24 HOURS
Status: DISCONTINUED | OUTPATIENT
Start: 2023-01-01 | End: 2023-01-01

## 2023-01-01 RX ORDER — CIPROFLOXACIN 500 MG/1
500 TABLET, FILM COATED ORAL 2 TIMES DAILY
Qty: 6 TABLET | Refills: 0 | Status: SHIPPED | OUTPATIENT
Start: 2024-01-01 | End: 2024-01-01

## 2023-01-01 RX ORDER — METFORMIN HCL 500 MG
TABLET, EXTENDED RELEASE 24 HR ORAL
Qty: 180 TABLET
Start: 2023-01-01

## 2023-01-01 RX ORDER — FLUTICASONE FUROATE AND VILANTEROL TRIFENATATE 100; 25 UG/1; UG/1
POWDER RESPIRATORY (INHALATION)
Qty: 60 EACH | Refills: 0 | Status: SHIPPED | OUTPATIENT
Start: 2023-01-01 | End: 2023-01-01

## 2023-01-01 RX ORDER — NALOXONE HYDROCHLORIDE 0.4 MG/ML
0.2 INJECTION, SOLUTION INTRAMUSCULAR; INTRAVENOUS; SUBCUTANEOUS
Status: DISCONTINUED | OUTPATIENT
Start: 2023-01-01 | End: 2023-01-01 | Stop reason: HOSPADM

## 2023-01-01 RX ORDER — METFORMIN HCL 500 MG
TABLET, EXTENDED RELEASE 24 HR ORAL
Qty: 60 TABLET | Refills: 1 | Status: SHIPPED | OUTPATIENT
Start: 2023-01-01 | End: 2023-01-01

## 2023-01-01 RX ORDER — LISINOPRIL 2.5 MG/1
2.5 TABLET ORAL DAILY
Qty: 90 TABLET | Refills: 2 | Status: SHIPPED | OUTPATIENT
Start: 2023-01-01 | End: 2023-01-01

## 2023-01-01 RX ORDER — ONDANSETRON 2 MG/ML
INJECTION INTRAMUSCULAR; INTRAVENOUS PRN
Status: DISCONTINUED | OUTPATIENT
Start: 2023-01-01 | End: 2023-01-01

## 2023-01-01 RX ORDER — PHENAZOPYRIDINE HYDROCHLORIDE 100 MG/1
100 TABLET, FILM COATED ORAL 3 TIMES DAILY PRN
Qty: 30 TABLET | Refills: 0 | Status: SHIPPED | OUTPATIENT
Start: 2023-01-01

## 2023-01-01 RX ORDER — AMOXICILLIN 250 MG
1 CAPSULE ORAL 2 TIMES DAILY
Status: DISCONTINUED | OUTPATIENT
Start: 2023-01-01 | End: 2023-01-01

## 2023-01-01 RX ORDER — EPHEDRINE SULFATE 50 MG/ML
INJECTION, SOLUTION INTRAMUSCULAR; INTRAVENOUS; SUBCUTANEOUS PRN
Status: DISCONTINUED | OUTPATIENT
Start: 2023-01-01 | End: 2023-01-01

## 2023-01-01 RX ORDER — METHADONE HYDROCHLORIDE 5 MG/1
5 TABLET ORAL 3 TIMES DAILY
Status: DISCONTINUED | OUTPATIENT
Start: 2023-01-01 | End: 2023-01-01 | Stop reason: HOSPADM

## 2023-01-01 RX ORDER — CEFAZOLIN SODIUM/WATER 2 G/20 ML
2 SYRINGE (ML) INTRAVENOUS SEE ADMIN INSTRUCTIONS
Status: DISCONTINUED | OUTPATIENT
Start: 2023-01-01 | End: 2023-01-01 | Stop reason: HOSPADM

## 2023-01-01 RX ORDER — ONDANSETRON 2 MG/ML
4 INJECTION INTRAMUSCULAR; INTRAVENOUS EVERY 30 MIN PRN
Status: DISCONTINUED | OUTPATIENT
Start: 2023-01-01 | End: 2023-01-01

## 2023-01-01 RX ORDER — SPIRONOLACTONE 100 MG/1
100 TABLET, FILM COATED ORAL DAILY
COMMUNITY

## 2023-01-01 RX ORDER — GADOBUTROL 604.72 MG/ML
11 INJECTION INTRAVENOUS ONCE
Status: COMPLETED | OUTPATIENT
Start: 2023-01-01 | End: 2023-01-01

## 2023-01-01 RX ORDER — LIDOCAINE 40 MG/G
CREAM TOPICAL
Status: DISCONTINUED | OUTPATIENT
Start: 2023-01-01 | End: 2023-01-01 | Stop reason: HOSPADM

## 2023-01-01 RX ORDER — OXYCODONE HYDROCHLORIDE 5 MG/1
5 TABLET ORAL ONCE
Qty: 1 TABLET | Refills: 0 | Status: SHIPPED | OUTPATIENT
Start: 2023-01-01 | End: 2023-01-01

## 2023-01-01 RX ORDER — IOPAMIDOL 755 MG/ML
118 INJECTION, SOLUTION INTRAVASCULAR ONCE
Status: COMPLETED | OUTPATIENT
Start: 2023-01-01 | End: 2023-01-01

## 2023-01-01 RX ORDER — FENTANYL CITRATE 50 UG/ML
INJECTION, SOLUTION INTRAMUSCULAR; INTRAVENOUS PRN
Status: DISCONTINUED | OUTPATIENT
Start: 2023-01-01 | End: 2023-01-01

## 2023-01-01 RX ORDER — METHADONE HYDROCHLORIDE 5 MG/1
5 TABLET ORAL EVERY 12 HOURS
COMMUNITY

## 2023-01-01 RX ORDER — DIAZEPAM 5 MG
5 TABLET ORAL EVERY 6 HOURS PRN
Qty: 1 TABLET | Refills: 0 | Status: SHIPPED | OUTPATIENT
Start: 2023-01-01 | End: 2023-01-01

## 2023-01-01 RX ORDER — MIRABEGRON 25 MG/1
25 TABLET, FILM COATED, EXTENDED RELEASE ORAL DAILY
Qty: 60 TABLET | Refills: 3 | Status: SHIPPED | OUTPATIENT
Start: 2023-01-01 | End: 2023-01-01

## 2023-01-01 RX ORDER — DEXTROSE MONOHYDRATE 25 G/50ML
25-50 INJECTION, SOLUTION INTRAVENOUS
Status: DISCONTINUED | OUTPATIENT
Start: 2023-01-01 | End: 2023-01-01 | Stop reason: HOSPADM

## 2023-01-01 RX ORDER — LEVOTHYROXINE SODIUM 88 UG/1
TABLET ORAL
Qty: 90 TABLET | Refills: 0 | OUTPATIENT
Start: 2023-01-01

## 2023-01-01 RX ORDER — DULOXETIN HYDROCHLORIDE 60 MG/1
120 CAPSULE, DELAYED RELEASE ORAL EVERY MORNING
Status: DISCONTINUED | OUTPATIENT
Start: 2023-01-01 | End: 2023-01-01 | Stop reason: HOSPADM

## 2023-01-01 RX ORDER — HYDROMORPHONE HCL IN WATER/PF 6 MG/30 ML
0.4 PATIENT CONTROLLED ANALGESIA SYRINGE INTRAVENOUS EVERY 5 MIN PRN
Status: DISCONTINUED | OUTPATIENT
Start: 2023-01-01 | End: 2023-01-01 | Stop reason: HOSPADM

## 2023-01-01 RX ORDER — HYDROMORPHONE HCL IN WATER/PF 6 MG/30 ML
0.2 PATIENT CONTROLLED ANALGESIA SYRINGE INTRAVENOUS EVERY 5 MIN PRN
Status: DISCONTINUED | OUTPATIENT
Start: 2023-01-01 | End: 2023-01-01 | Stop reason: HOSPADM

## 2023-01-01 RX ORDER — HYDROCODONE BITARTRATE AND ACETAMINOPHEN 5; 325 MG/1; MG/1
1 TABLET ORAL EVERY 6 HOURS PRN
Status: DISCONTINUED | OUTPATIENT
Start: 2023-01-01 | End: 2023-01-01 | Stop reason: HOSPADM

## 2023-01-01 RX ORDER — SODIUM CHLORIDE 9 MG/ML
INJECTION, SOLUTION INTRAVENOUS CONTINUOUS
Status: DISCONTINUED | OUTPATIENT
Start: 2023-01-01 | End: 2023-01-01

## 2023-01-01 RX ORDER — CEFTRIAXONE 2 G/1
2 INJECTION, POWDER, FOR SOLUTION INTRAMUSCULAR; INTRAVENOUS ONCE
Status: COMPLETED | OUTPATIENT
Start: 2023-01-01 | End: 2023-01-01

## 2023-01-01 RX ADMIN — MIDAZOLAM 1 MG: 1 INJECTION INTRAMUSCULAR; INTRAVENOUS at 08:44

## 2023-01-01 RX ADMIN — TRAZODONE HYDROCHLORIDE 150 MG: 50 TABLET ORAL at 23:39

## 2023-01-01 RX ADMIN — PROPOFOL 50 MCG/KG/MIN: 10 INJECTION, EMULSION INTRAVENOUS at 07:48

## 2023-01-01 RX ADMIN — LIDOCAINE HYDROCHLORIDE 100 MG: 20 INJECTION, SOLUTION INFILTRATION; PERINEURAL at 07:46

## 2023-01-01 RX ADMIN — ACETAMINOPHEN 975 MG: 325 TABLET, FILM COATED ORAL at 20:00

## 2023-01-01 RX ADMIN — HYDROMORPHONE HYDROCHLORIDE 0.2 MG: 0.2 INJECTION, SOLUTION INTRAMUSCULAR; INTRAVENOUS; SUBCUTANEOUS at 13:26

## 2023-01-01 RX ADMIN — HYDROMORPHONE HYDROCHLORIDE 0.2 MG: 0.2 INJECTION, SOLUTION INTRAMUSCULAR; INTRAVENOUS; SUBCUTANEOUS at 16:15

## 2023-01-01 RX ADMIN — ROSUVASTATIN CALCIUM 20 MG: 20 TABLET, FILM COATED ORAL at 10:09

## 2023-01-01 RX ADMIN — SODIUM CHLORIDE 40 ML: 9 INJECTION, SOLUTION INTRAVENOUS at 14:53

## 2023-01-01 RX ADMIN — METHADONE HYDROCHLORIDE 5 MG: 5 TABLET ORAL at 08:26

## 2023-01-01 RX ADMIN — DOCUSATE SODIUM 50 MG AND SENNOSIDES 8.6 MG 1 TABLET: 8.6; 5 TABLET, FILM COATED ORAL at 19:46

## 2023-01-01 RX ADMIN — METHADONE HYDROCHLORIDE 5 MG: 5 TABLET ORAL at 15:50

## 2023-01-01 RX ADMIN — MICONAZOLE NITRATE: 2 POWDER TOPICAL at 21:14

## 2023-01-01 RX ADMIN — SODIUM CHLORIDE 74 ML: 9 INJECTION, SOLUTION INTRAVENOUS at 17:56

## 2023-01-01 RX ADMIN — GABAPENTIN 1600 MG: 800 TABLET, COATED ORAL at 10:09

## 2023-01-01 RX ADMIN — METHADONE HYDROCHLORIDE 5 MG: 5 TABLET ORAL at 12:47

## 2023-01-01 RX ADMIN — GABAPENTIN 1600 MG: 800 TABLET, COATED ORAL at 09:52

## 2023-01-01 RX ADMIN — PROPOFOL 200 MG: 10 INJECTION, EMULSION INTRAVENOUS at 07:46

## 2023-01-01 RX ADMIN — HYDROMORPHONE HYDROCHLORIDE 0.2 MG: 0.2 INJECTION, SOLUTION INTRAMUSCULAR; INTRAVENOUS; SUBCUTANEOUS at 05:05

## 2023-01-01 RX ADMIN — BACLOFEN 10 MG: 10 TABLET ORAL at 15:54

## 2023-01-01 RX ADMIN — INSULIN ASPART 1 UNITS: 100 INJECTION, SOLUTION INTRAVENOUS; SUBCUTANEOUS at 18:09

## 2023-01-01 RX ADMIN — METHADONE HYDROCHLORIDE 5 MG: 5 TABLET ORAL at 20:05

## 2023-01-01 RX ADMIN — HYDROMORPHONE HYDROCHLORIDE 0.5 MG: 1 INJECTION, SOLUTION INTRAMUSCULAR; INTRAVENOUS; SUBCUTANEOUS at 13:29

## 2023-01-01 RX ADMIN — LEVOTHYROXINE SODIUM 88 MCG: 88 TABLET ORAL at 08:25

## 2023-01-01 RX ADMIN — PHENYLEPHRINE HYDROCHLORIDE 100 MCG: 10 INJECTION INTRAVENOUS at 07:51

## 2023-01-01 RX ADMIN — FLUTICASONE FUROATE AND VILANTEROL TRIFENATATE 1 PUFF: 100; 25 POWDER RESPIRATORY (INHALATION) at 08:26

## 2023-01-01 RX ADMIN — METHADONE HYDROCHLORIDE 5 MG: 5 TABLET ORAL at 19:45

## 2023-01-01 RX ADMIN — GABAPENTIN 1600 MG: 800 TABLET, COATED ORAL at 15:50

## 2023-01-01 RX ADMIN — PHENYLEPHRINE HYDROCHLORIDE 100 MCG: 10 INJECTION INTRAVENOUS at 08:00

## 2023-01-01 RX ADMIN — IOPAMIDOL 118 ML: 755 INJECTION, SOLUTION INTRAVENOUS at 17:56

## 2023-01-01 RX ADMIN — HYDROMORPHONE HYDROCHLORIDE 0.4 MG: 0.2 INJECTION, SOLUTION INTRAMUSCULAR; INTRAVENOUS; SUBCUTANEOUS at 09:09

## 2023-01-01 RX ADMIN — HYDROMORPHONE HYDROCHLORIDE 0.5 MG: 1 INJECTION, SOLUTION INTRAMUSCULAR; INTRAVENOUS; SUBCUTANEOUS at 10:27

## 2023-01-01 RX ADMIN — HYDROMORPHONE HYDROCHLORIDE 0.2 MG: 0.2 INJECTION, SOLUTION INTRAMUSCULAR; INTRAVENOUS; SUBCUTANEOUS at 22:12

## 2023-01-01 RX ADMIN — METHADONE HYDROCHLORIDE 5 MG: 5 TABLET ORAL at 12:12

## 2023-01-01 RX ADMIN — PHENAZOPYRIDINE HYDROCHLORIDE 100 MG: 100 TABLET ORAL at 18:20

## 2023-01-01 RX ADMIN — ONDANSETRON 4 MG: 2 INJECTION INTRAMUSCULAR; INTRAVENOUS at 12:56

## 2023-01-01 RX ADMIN — HYDROMORPHONE HYDROCHLORIDE 0.2 MG: 0.2 INJECTION, SOLUTION INTRAMUSCULAR; INTRAVENOUS; SUBCUTANEOUS at 19:59

## 2023-01-01 RX ADMIN — BACLOFEN 10 MG: 10 TABLET ORAL at 01:49

## 2023-01-01 RX ADMIN — METHOCARBAMOL 500 MG: 500 TABLET ORAL at 11:56

## 2023-01-01 RX ADMIN — METHADONE HYDROCHLORIDE 5 MG: 5 TABLET ORAL at 06:35

## 2023-01-01 RX ADMIN — GABAPENTIN 1600 MG: 800 TABLET, COATED ORAL at 13:35

## 2023-01-01 RX ADMIN — GABAPENTIN 1600 MG: 800 TABLET, COATED ORAL at 18:20

## 2023-01-01 RX ADMIN — ACETAMINOPHEN 975 MG: 325 TABLET, FILM COATED ORAL at 10:09

## 2023-01-01 RX ADMIN — ONDANSETRON 4 MG: 4 TABLET, ORALLY DISINTEGRATING ORAL at 11:42

## 2023-01-01 RX ADMIN — Medication 2 G: at 07:38

## 2023-01-01 RX ADMIN — FENTANYL CITRATE 100 MCG: 50 INJECTION INTRAMUSCULAR; INTRAVENOUS at 07:46

## 2023-01-01 RX ADMIN — Medication 5 MG: at 08:03

## 2023-01-01 RX ADMIN — FENTANYL CITRATE 50 MCG: 50 INJECTION, SOLUTION INTRAMUSCULAR; INTRAVENOUS at 09:00

## 2023-01-01 RX ADMIN — GADOBUTROL 11 ML: 604.72 INJECTION INTRAVENOUS at 16:23

## 2023-01-01 RX ADMIN — SENNOSIDES AND DOCUSATE SODIUM 2 TABLET: 50; 8.6 TABLET ORAL at 09:53

## 2023-01-01 RX ADMIN — SENNOSIDES AND DOCUSATE SODIUM 2 TABLET: 50; 8.6 TABLET ORAL at 20:31

## 2023-01-01 RX ADMIN — GABAPENTIN 1600 MG: 800 TABLET, COATED ORAL at 13:26

## 2023-01-01 RX ADMIN — HYDROMORPHONE HYDROCHLORIDE 0.5 MG: 1 INJECTION, SOLUTION INTRAMUSCULAR; INTRAVENOUS; SUBCUTANEOUS at 14:35

## 2023-01-01 RX ADMIN — LIDOCAINE: 50 OINTMENT TOPICAL at 13:49

## 2023-01-01 RX ADMIN — ROSUVASTATIN CALCIUM 20 MG: 20 TABLET, FILM COATED ORAL at 08:26

## 2023-01-01 RX ADMIN — HYDROMORPHONE HYDROCHLORIDE 0.5 MG: 1 INJECTION, SOLUTION INTRAMUSCULAR; INTRAVENOUS; SUBCUTANEOUS at 12:55

## 2023-01-01 RX ADMIN — ONDANSETRON 4 MG: 2 INJECTION INTRAMUSCULAR; INTRAVENOUS at 20:05

## 2023-01-01 RX ADMIN — HYDROMORPHONE HYDROCHLORIDE 0.2 MG: 0.2 INJECTION, SOLUTION INTRAMUSCULAR; INTRAVENOUS; SUBCUTANEOUS at 15:54

## 2023-01-01 RX ADMIN — DULOXETINE HYDROCHLORIDE 120 MG: 60 CAPSULE, DELAYED RELEASE ORAL at 09:54

## 2023-01-01 RX ADMIN — MIDAZOLAM 1 MG: 1 INJECTION INTRAMUSCULAR; INTRAVENOUS at 07:46

## 2023-01-01 RX ADMIN — HYDROMORPHONE HYDROCHLORIDE 0.2 MG: 0.2 INJECTION, SOLUTION INTRAMUSCULAR; INTRAVENOUS; SUBCUTANEOUS at 21:17

## 2023-01-01 RX ADMIN — SENNOSIDES AND DOCUSATE SODIUM 2 TABLET: 50; 8.6 TABLET ORAL at 08:26

## 2023-01-01 RX ADMIN — Medication 5 MG: at 08:06

## 2023-01-01 RX ADMIN — GABAPENTIN 1600 MG: 800 TABLET, COATED ORAL at 08:26

## 2023-01-01 RX ADMIN — CEFTRIAXONE SODIUM 2 G: 2 INJECTION, POWDER, FOR SOLUTION INTRAMUSCULAR; INTRAVENOUS at 14:42

## 2023-01-01 RX ADMIN — Medication 5 MG: at 07:57

## 2023-01-01 RX ADMIN — HYDROCODONE BITARTRATE AND ACETAMINOPHEN 1 TABLET: 5; 325 TABLET ORAL at 08:33

## 2023-01-01 RX ADMIN — ONDANSETRON 4 MG: 2 INJECTION INTRAMUSCULAR; INTRAVENOUS at 18:30

## 2023-01-01 RX ADMIN — OXYBUTYNIN CHLORIDE 15 MG: 10 TABLET, EXTENDED RELEASE ORAL at 09:53

## 2023-01-01 RX ADMIN — BACLOFEN 10 MG: 10 TABLET ORAL at 16:21

## 2023-01-01 RX ADMIN — BACLOFEN 10 MG: 10 TABLET ORAL at 09:52

## 2023-01-01 RX ADMIN — PHENYLEPHRINE HYDROCHLORIDE 100 MCG: 10 INJECTION INTRAVENOUS at 07:52

## 2023-01-01 RX ADMIN — SODIUM CHLORIDE, POTASSIUM CHLORIDE, SODIUM LACTATE AND CALCIUM CHLORIDE: 600; 310; 30; 20 INJECTION, SOLUTION INTRAVENOUS at 07:05

## 2023-01-01 RX ADMIN — LEVOTHYROXINE SODIUM 88 MCG: 88 TABLET ORAL at 10:09

## 2023-01-01 RX ADMIN — METHADONE HYDROCHLORIDE 5 MG: 5 TABLET ORAL at 20:29

## 2023-01-01 RX ADMIN — FENTANYL CITRATE 50 MCG: 50 INJECTION, SOLUTION INTRAMUSCULAR; INTRAVENOUS at 08:28

## 2023-01-01 RX ADMIN — SODIUM CHLORIDE: 9 INJECTION, SOLUTION INTRAVENOUS at 11:54

## 2023-01-01 RX ADMIN — METHADONE HYDROCHLORIDE 5 MG: 5 TABLET ORAL at 13:26

## 2023-01-01 RX ADMIN — METHADONE HYDROCHLORIDE 5 MG: 5 TABLET ORAL at 09:52

## 2023-01-01 RX ADMIN — IOPAMIDOL 100 ML: 755 INJECTION, SOLUTION INTRAVENOUS at 14:52

## 2023-01-01 RX ADMIN — LEVOTHYROXINE SODIUM 88 MCG: 88 TABLET ORAL at 09:53

## 2023-01-01 RX ADMIN — Medication 5 MG: at 07:54

## 2023-01-01 RX ADMIN — HYDROMORPHONE HYDROCHLORIDE 0.2 MG: 0.2 INJECTION, SOLUTION INTRAMUSCULAR; INTRAVENOUS; SUBCUTANEOUS at 22:15

## 2023-01-01 RX ADMIN — SODIUM CHLORIDE 72 ML: 9 INJECTION, SOLUTION INTRAVENOUS at 14:12

## 2023-01-01 RX ADMIN — HYDROMORPHONE HYDROCHLORIDE 0.2 MG: 0.2 INJECTION, SOLUTION INTRAMUSCULAR; INTRAVENOUS; SUBCUTANEOUS at 18:20

## 2023-01-01 RX ADMIN — FENTANYL CITRATE 50 MCG: 50 INJECTION, SOLUTION INTRAMUSCULAR; INTRAVENOUS at 08:37

## 2023-01-01 RX ADMIN — METHOCARBAMOL 500 MG: 500 TABLET ORAL at 13:26

## 2023-01-01 RX ADMIN — PHENAZOPYRIDINE HYDROCHLORIDE 100 MG: 100 TABLET ORAL at 08:26

## 2023-01-01 RX ADMIN — BACLOFEN 10 MG: 10 TABLET ORAL at 08:26

## 2023-01-01 RX ADMIN — SODIUM CHLORIDE: 9 INJECTION, SOLUTION INTRAVENOUS at 17:03

## 2023-01-01 RX ADMIN — CEFTRIAXONE SODIUM 2 G: 2 INJECTION, POWDER, FOR SOLUTION INTRAMUSCULAR; INTRAVENOUS at 12:20

## 2023-01-01 RX ADMIN — BACLOFEN 10 MG: 10 TABLET ORAL at 12:47

## 2023-01-01 RX ADMIN — SODIUM CHLORIDE: 9 INJECTION, SOLUTION INTRAVENOUS at 02:01

## 2023-01-01 RX ADMIN — SODIUM CHLORIDE 1000 ML: 9 INJECTION, SOLUTION INTRAVENOUS at 12:59

## 2023-01-01 RX ADMIN — HYDROMORPHONE HYDROCHLORIDE 0.2 MG: 0.2 INJECTION, SOLUTION INTRAMUSCULAR; INTRAVENOUS; SUBCUTANEOUS at 09:49

## 2023-01-01 RX ADMIN — ONDANSETRON 4 MG: 2 INJECTION INTRAMUSCULAR; INTRAVENOUS at 07:49

## 2023-01-01 RX ADMIN — HYDROMORPHONE HYDROCHLORIDE 0.2 MG: 0.2 INJECTION, SOLUTION INTRAMUSCULAR; INTRAVENOUS; SUBCUTANEOUS at 18:23

## 2023-01-01 RX ADMIN — DULOXETINE HYDROCHLORIDE 120 MG: 60 CAPSULE, DELAYED RELEASE ORAL at 08:26

## 2023-01-01 RX ADMIN — METHOCARBAMOL 500 MG: 500 TABLET ORAL at 19:45

## 2023-01-01 RX ADMIN — CEFTRIAXONE SODIUM 2 G: 2 INJECTION, POWDER, FOR SOLUTION INTRAMUSCULAR; INTRAVENOUS at 13:26

## 2023-01-01 RX ADMIN — ACETAMINOPHEN 975 MG: 325 TABLET, FILM COATED ORAL at 09:52

## 2023-01-01 RX ADMIN — SENNOSIDES AND DOCUSATE SODIUM 2 TABLET: 50; 8.6 TABLET ORAL at 20:00

## 2023-01-01 RX ADMIN — HYDROMORPHONE HYDROCHLORIDE 0.2 MG: 0.2 INJECTION, SOLUTION INTRAMUSCULAR; INTRAVENOUS; SUBCUTANEOUS at 02:01

## 2023-01-01 RX ADMIN — FENTANYL CITRATE 50 MCG: 50 INJECTION, SOLUTION INTRAMUSCULAR; INTRAVENOUS at 08:45

## 2023-01-01 RX ADMIN — TRAZODONE HYDROCHLORIDE 150 MG: 50 TABLET ORAL at 21:55

## 2023-01-01 RX ADMIN — DEXAMETHASONE SODIUM PHOSPHATE 4 MG: 4 INJECTION, SOLUTION INTRA-ARTICULAR; INTRALESIONAL; INTRAMUSCULAR; INTRAVENOUS; SOFT TISSUE at 07:49

## 2023-01-01 RX ADMIN — PHENAZOPYRIDINE HYDROCHLORIDE 100 MG: 100 TABLET ORAL at 12:47

## 2023-01-01 RX ADMIN — DULOXETINE HYDROCHLORIDE 120 MG: 60 CAPSULE, DELAYED RELEASE ORAL at 11:32

## 2023-01-01 RX ADMIN — TRAZODONE HYDROCHLORIDE 150 MG: 50 TABLET ORAL at 21:16

## 2023-01-01 RX ADMIN — OXYBUTYNIN CHLORIDE 15 MG: 10 TABLET, EXTENDED RELEASE ORAL at 18:28

## 2023-01-01 RX ADMIN — GABAPENTIN 1600 MG: 800 TABLET, COATED ORAL at 12:48

## 2023-01-01 RX ADMIN — OXYBUTYNIN CHLORIDE 15 MG: 10 TABLET, EXTENDED RELEASE ORAL at 08:26

## 2023-01-01 RX ADMIN — MIDAZOLAM 1 MG: 1 INJECTION INTRAMUSCULAR; INTRAVENOUS at 07:39

## 2023-01-01 RX ADMIN — HYDROMORPHONE HYDROCHLORIDE 0.2 MG: 0.2 INJECTION, SOLUTION INTRAMUSCULAR; INTRAVENOUS; SUBCUTANEOUS at 18:19

## 2023-01-01 RX ADMIN — HYDROMORPHONE HYDROCHLORIDE 0.2 MG: 0.2 INJECTION, SOLUTION INTRAMUSCULAR; INTRAVENOUS; SUBCUTANEOUS at 00:55

## 2023-01-01 RX ADMIN — ALBUTEROL SULFATE 2 PUFF: 90 AEROSOL, METERED RESPIRATORY (INHALATION) at 03:19

## 2023-01-01 RX ADMIN — PHENYLEPHRINE HYDROCHLORIDE 0.5 MCG/KG/MIN: 10 INJECTION INTRAVENOUS at 07:57

## 2023-01-01 RX ADMIN — PHENAZOPYRIDINE HYDROCHLORIDE 100 MG: 100 TABLET ORAL at 13:25

## 2023-01-01 RX ADMIN — HYDROCODONE BITARTRATE AND ACETAMINOPHEN 1 TABLET: 5; 325 TABLET ORAL at 14:13

## 2023-01-01 RX ADMIN — GABAPENTIN 1600 MG: 800 TABLET, COATED ORAL at 19:46

## 2023-01-01 RX ADMIN — ROSUVASTATIN CALCIUM 20 MG: 20 TABLET, FILM COATED ORAL at 09:54

## 2023-01-01 RX ADMIN — FLUTICASONE FUROATE AND VILANTEROL TRIFENATATE 1 PUFF: 100; 25 POWDER RESPIRATORY (INHALATION) at 09:58

## 2023-01-01 RX ADMIN — IOPAMIDOL 111 ML: 755 INJECTION, SOLUTION INTRAVENOUS at 14:11

## 2023-01-01 RX ADMIN — PHENYLEPHRINE HYDROCHLORIDE 100 MCG: 10 INJECTION INTRAVENOUS at 07:58

## 2023-01-01 RX ADMIN — OXYBUTYNIN CHLORIDE 15 MG: 10 TABLET, EXTENDED RELEASE ORAL at 10:09

## 2023-01-01 ASSESSMENT — ACTIVITIES OF DAILY LIVING (ADL)
ADLS_ACUITY_SCORE: 41
ADLS_ACUITY_SCORE: 33
ADLS_ACUITY_SCORE: 36
ADLS_ACUITY_SCORE: 24
ADLS_ACUITY_SCORE: 33
ADLS_ACUITY_SCORE: 37
ADLS_ACUITY_SCORE: 41
ADLS_ACUITY_SCORE: 35
ADLS_ACUITY_SCORE: 41
ADLS_ACUITY_SCORE: 37
ADLS_ACUITY_SCORE: 37
ADLS_ACUITY_SCORE: 26
ADLS_ACUITY_SCORE: 33
ADLS_ACUITY_SCORE: 33
ADLS_ACUITY_SCORE: 37
ADLS_ACUITY_SCORE: 35
DEPENDENT_IADLS:: CLEANING
ADLS_ACUITY_SCORE: 41
ADLS_ACUITY_SCORE: 33
ADLS_ACUITY_SCORE: 35
ADLS_ACUITY_SCORE: 35
ADLS_ACUITY_SCORE: 26
ADLS_ACUITY_SCORE: 35
ADLS_ACUITY_SCORE: 33
ADLS_ACUITY_SCORE: 37
ADLS_ACUITY_SCORE: 26
ADLS_ACUITY_SCORE: 37
ADLS_ACUITY_SCORE: 26
ADLS_ACUITY_SCORE: 33
ADLS_ACUITY_SCORE: 41

## 2023-01-01 ASSESSMENT — PATIENT HEALTH QUESTIONNAIRE - PHQ9
SUM OF ALL RESPONSES TO PHQ QUESTIONS 1-9: 15
SUM OF ALL RESPONSES TO PHQ QUESTIONS 1-9: 15
10. IF YOU CHECKED OFF ANY PROBLEMS, HOW DIFFICULT HAVE THESE PROBLEMS MADE IT FOR YOU TO DO YOUR WORK, TAKE CARE OF THINGS AT HOME, OR GET ALONG WITH OTHER PEOPLE: VERY DIFFICULT

## 2023-01-01 ASSESSMENT — LIFESTYLE VARIABLES: TOBACCO_USE: 0

## 2023-01-01 ASSESSMENT — PAIN SCALES - GENERAL: PAINLEVEL: MODERATE PAIN (5)

## 2023-01-01 ASSESSMENT — ENCOUNTER SYMPTOMS: SEIZURES: 0

## 2023-01-01 NOTE — CONSULTS
NUTRITION EDUCATION      REASON FOR ASSESSMENT:  Nutrition education on Low Fiber Diet      NUTRITION HISTORY:  As per detailed nutrition history obtained from pt via phone visit on 6/4:  - No oral intake x3 days PTA due to nausea/vomiting and abdominal pain.   - Prior to that she was eating normally. Normal for her is 1 meal/day in the afternoon or evening.   - Dentures were lost during her last admission, so she has relied on softer foods she can mash against her upper palate.   - Does not snack, but will drink 1 Boost (high protein version) daily.   - consumes eggs, dairy products, ground meats for protein.       CURRENT DIET ORDER:  Full Liquid    NUTRITION DIAGNOSIS:   Food- and nutrition-related knowledge deficit R/t no previous education provided AEB Provider consult for provide low fiber diet education.    INTERVENTIONS:  Nutrition Prescription:    Recommended ADAT to Low Fiber    Implementation:    Nutrition Education (Content):  o Reviewed diet guidelines over the phone  o Provided diet handouts (Nutrition Associate to send up to the floor today):  - Low Fiber Nutrition Therapy  - Fiber Content in Common Foods    Nutrition Education (Application):  o Discussed current eating habits and recommended alternative food choice    Anticipate good compliance    Diet Education  -  refer to Education Flowsheet    Goals:    Patient verbalizes understanding of diet by stating she will review the written material sent and comply.  Pt understands she is to follow this for the next 2-3 weeks (and perhaps longer).    All of the above goals are met during diet education session    Follow Up/Monitoring:    Provide RD contact information for future questions.     Ekta Patel, RD, LD, CNSC   patient

## 2023-01-11 ENCOUNTER — TRANSFERRED RECORDS (OUTPATIENT)
Dept: HEALTH INFORMATION MANAGEMENT | Facility: CLINIC | Age: 68
End: 2023-01-11
Payer: COMMERCIAL

## 2023-01-11 PROCEDURE — 88342 IMHCHEM/IMCYTCHM 1ST ANTB: CPT | Mod: TC,ORL | Performed by: INTERNAL MEDICINE

## 2023-01-12 ENCOUNTER — LAB REQUISITION (OUTPATIENT)
Dept: LAB | Facility: CLINIC | Age: 68
End: 2023-01-12
Payer: COMMERCIAL

## 2023-01-12 DIAGNOSIS — K74.60 UNSPECIFIED CIRRHOSIS OF LIVER (H): ICD-10-CM

## 2023-01-12 DIAGNOSIS — R11.2 NAUSEA WITH VOMITING, UNSPECIFIED: ICD-10-CM

## 2023-01-12 DIAGNOSIS — K30 FUNCTIONAL DYSPEPSIA: ICD-10-CM

## 2023-01-12 DIAGNOSIS — K31.89 OTHER DISEASES OF STOMACH AND DUODENUM: ICD-10-CM

## 2023-01-12 DIAGNOSIS — R10.13 EPIGASTRIC PAIN: ICD-10-CM

## 2023-01-13 PROCEDURE — 88305 TISSUE EXAM BY PATHOLOGIST: CPT | Mod: 26 | Performed by: PATHOLOGY

## 2023-01-13 PROCEDURE — 88342 IMHCHEM/IMCYTCHM 1ST ANTB: CPT | Mod: 26 | Performed by: PATHOLOGY

## 2023-01-16 ENCOUNTER — TRANSFERRED RECORDS (OUTPATIENT)
Dept: HEALTH INFORMATION MANAGEMENT | Facility: CLINIC | Age: 68
End: 2023-01-16

## 2023-01-16 LAB
PATH REPORT.ADDENDUM SPEC: NORMAL
PATH REPORT.COMMENTS IMP SPEC: NORMAL
PATH REPORT.COMMENTS IMP SPEC: NORMAL
PATH REPORT.FINAL DX SPEC: NORMAL
PATH REPORT.GROSS SPEC: NORMAL
PATH REPORT.MICROSCOPIC SPEC OTHER STN: NORMAL
PATH REPORT.RELEVANT HX SPEC: NORMAL
PHOTO IMAGE: NORMAL

## 2023-01-24 ENCOUNTER — LAB (OUTPATIENT)
Dept: LAB | Facility: CLINIC | Age: 68
End: 2023-01-24
Payer: COMMERCIAL

## 2023-01-24 DIAGNOSIS — E78.5 HYPERLIPIDEMIA LDL GOAL <100: ICD-10-CM

## 2023-01-24 DIAGNOSIS — I50.32 CHRONIC DIASTOLIC (CONGESTIVE) HEART FAILURE (H): ICD-10-CM

## 2023-01-24 DIAGNOSIS — R18.8 OTHER ASCITES: Primary | ICD-10-CM

## 2023-01-24 LAB
ALBUMIN SERPL BCG-MCNC: 4.5 G/DL (ref 3.5–5.2)
ALP SERPL-CCNC: 102 U/L (ref 35–104)
ALT SERPL W P-5'-P-CCNC: 20 U/L (ref 10–35)
ANION GAP SERPL CALCULATED.3IONS-SCNC: 12 MMOL/L (ref 7–15)
AST SERPL W P-5'-P-CCNC: 29 U/L (ref 10–35)
BASOPHILS # BLD AUTO: 0.1 10E3/UL (ref 0–0.2)
BASOPHILS NFR BLD AUTO: 1 %
BILIRUB SERPL-MCNC: 0.4 MG/DL
BUN SERPL-MCNC: 22.2 MG/DL (ref 8–23)
CALCIUM SERPL-MCNC: 10.9 MG/DL (ref 8.8–10.2)
CHLORIDE SERPL-SCNC: 97 MMOL/L (ref 98–107)
CHOLEST SERPL-MCNC: 243 MG/DL
CREAT SERPL-MCNC: 0.94 MG/DL (ref 0.51–0.95)
CRP SERPL-MCNC: 7.36 MG/L
DEPRECATED HCO3 PLAS-SCNC: 28 MMOL/L (ref 22–29)
EOSINOPHIL # BLD AUTO: 0.1 10E3/UL (ref 0–0.7)
EOSINOPHIL NFR BLD AUTO: 2 %
ERYTHROCYTE [DISTWIDTH] IN BLOOD BY AUTOMATED COUNT: 15.8 % (ref 10–15)
GFR SERPL CREATININE-BSD FRML MDRD: 67 ML/MIN/1.73M2
GLUCOSE SERPL-MCNC: 110 MG/DL (ref 70–99)
HCT VFR BLD AUTO: 47.3 % (ref 35–47)
HDLC SERPL-MCNC: 58 MG/DL
HGB BLD-MCNC: 14.8 G/DL (ref 11.7–15.7)
IMM GRANULOCYTES # BLD: 0 10E3/UL
IMM GRANULOCYTES NFR BLD: 0 %
LDLC SERPL CALC-MCNC: 163 MG/DL
LYMPHOCYTES # BLD AUTO: 1.8 10E3/UL (ref 0.8–5.3)
LYMPHOCYTES NFR BLD AUTO: 21 %
MCH RBC QN AUTO: 24.5 PG (ref 26.5–33)
MCHC RBC AUTO-ENTMCNC: 31.3 G/DL (ref 31.5–36.5)
MCV RBC AUTO: 78 FL (ref 78–100)
MONOCYTES # BLD AUTO: 0.4 10E3/UL (ref 0–1.3)
MONOCYTES NFR BLD AUTO: 5 %
NEUTROPHILS # BLD AUTO: 6 10E3/UL (ref 1.6–8.3)
NEUTROPHILS NFR BLD AUTO: 71 %
NONHDLC SERPL-MCNC: 185 MG/DL
NRBC # BLD AUTO: 0 10E3/UL
NRBC BLD AUTO-RTO: 0 /100
PLATELET # BLD AUTO: 327 10E3/UL (ref 150–450)
POTASSIUM SERPL-SCNC: 4 MMOL/L (ref 3.4–5.3)
PROT SERPL-MCNC: 8.1 G/DL (ref 6.4–8.3)
RBC # BLD AUTO: 6.05 10E6/UL (ref 3.8–5.2)
SODIUM SERPL-SCNC: 137 MMOL/L (ref 136–145)
TRIGL SERPL-MCNC: 108 MG/DL
WBC # BLD AUTO: 8.4 10E3/UL (ref 4–11)

## 2023-01-24 PROCEDURE — 86381 MITOCHONDRIAL ANTIBODY EACH: CPT

## 2023-01-24 PROCEDURE — 36415 COLL VENOUS BLD VENIPUNCTURE: CPT

## 2023-01-24 PROCEDURE — 86038 ANTINUCLEAR ANTIBODIES: CPT

## 2023-01-24 PROCEDURE — 80061 LIPID PANEL: CPT

## 2023-01-24 PROCEDURE — 86140 C-REACTIVE PROTEIN: CPT

## 2023-01-24 PROCEDURE — 80053 COMPREHEN METABOLIC PANEL: CPT

## 2023-01-24 PROCEDURE — 86015 ACTIN ANTIBODY EACH: CPT

## 2023-01-24 PROCEDURE — 87340 HEPATITIS B SURFACE AG IA: CPT

## 2023-01-24 PROCEDURE — 86708 HEPATITIS A ANTIBODY: CPT

## 2023-01-24 PROCEDURE — 86706 HEP B SURFACE ANTIBODY: CPT | Mod: GA

## 2023-01-24 PROCEDURE — 86709 HEPATITIS A IGM ANTIBODY: CPT

## 2023-01-24 PROCEDURE — 87522 HEPATITIS C REVRS TRNSCRPJ: CPT

## 2023-01-24 PROCEDURE — 85025 COMPLETE CBC W/AUTO DIFF WBC: CPT

## 2023-01-25 ENCOUNTER — CARE COORDINATION (OUTPATIENT)
Dept: CARDIOLOGY | Facility: CLINIC | Age: 68
End: 2023-01-25
Payer: COMMERCIAL

## 2023-01-25 ENCOUNTER — MYC MEDICAL ADVICE (OUTPATIENT)
Dept: CARDIOLOGY | Facility: CLINIC | Age: 68
End: 2023-01-25
Payer: COMMERCIAL

## 2023-01-25 DIAGNOSIS — E78.5 HYPERLIPIDEMIA LDL GOAL <100: ICD-10-CM

## 2023-01-25 LAB
ANA SER QL IF: NEGATIVE
HAV IGG SER QL IA: NONREACTIVE
HAV IGM SERPL QL IA: NONREACTIVE
HBV SURFACE AB SERPL IA-ACNC: 1 M[IU]/ML
HBV SURFACE AB SERPL IA-ACNC: NONREACTIVE M[IU]/ML
HBV SURFACE AG SERPL QL IA: NONREACTIVE

## 2023-01-25 NOTE — PROGRESS NOTES
FLP/alt results from 1/25/23 noted. Pt had visit with  10/2022 and crestor was increased to 20 mg daily at that time. However her medication list for some reason has a 20 mg tablet listed, but that she is taking 5 mg daily. I left message for pt to call back to review results and verify what dose she has been taking. I will route an update to  once I have confirmed her crestor dose as her numbers have worsened. Raciel DESIR January 25, 2023, 3:05 PM      Component      Latest Ref Rng & Units 1/24/2023   Cholesterol      <200 mg/dL 243 (H)   Triglycerides      <150 mg/dL 108   HDL Cholesterol      >=50 mg/dL 58   LDL Cholesterol Calculated      <=100 mg/dL 163 (H)   Non HDL Cholesterol      <130 mg/dL 185 (H)

## 2023-01-26 ENCOUNTER — TELEPHONE (OUTPATIENT)
Dept: UROLOGY | Facility: CLINIC | Age: 68
End: 2023-01-26
Payer: COMMERCIAL

## 2023-01-26 LAB
HCV RNA SERPL NAA+PROBE-ACNC: NOT DETECTED IU/ML
SMA IGG SER-ACNC: 10 UNITS

## 2023-01-26 NOTE — LETTER
2023      TO: Bhavani White  [address]  [place] [address]         To whom it may concern,    PHYSICIAN ORDERS      DATE & TIME ISSUED: 2023   PATIENT NAME: Bhavani White  : 1955   DIAGNOSIS:  Neurogenic bladder N31.9          Exchange 20 Fr suprapubic catheter using sterile technique every 4 weeks or as needed for neurogenic bladder related urinary retention. Fill balloon with 7 mL of sterile water after positive urine return.              Sincerely,      Yoandy Rios MD

## 2023-01-26 NOTE — TELEPHONE ENCOUNTER
[facility] Call Center    Phone Message    May a detailed message be left on voicemail: yes     Reason for Call: Order(s): Home Care Orders: Skilled Nursing: SP catheter management fax to [facility] at [phone]. Wants to see her on 1/31/23.    Action Taken: Message routed to:  [facility] uro    Travel Screening: Not Applicable

## 2023-01-27 LAB — MITOCHONDRIA M2 IGG SER-ACNC: 2.2 U/ML

## 2023-01-31 NOTE — TELEPHONE ENCOUNTER
Ivet Snow, LORENZA  You 4 days ago     MC  Can you call and ask her what size catheter she uses? I have read through he whole chart and I cannot find documentation.

## 2023-01-31 NOTE — TELEPHONE ENCOUNTER
Juan Francisco with active care calling back stated that she uses sz 20. Please fax so he can see her tomorrow 2/1  [phone] and call juan francisco to verify. Thank you

## 2023-01-31 NOTE — CONFIDENTIAL NOTE
Physicians orders for catheter exchange faxed to Juan Francisco at [phone], RN case manager.    Message left on Juan Francisco' voicemail.    Ivet Snow CMA  01/31/23  9:18 AM

## 2023-02-01 ENCOUNTER — TELEPHONE (OUTPATIENT)
Dept: UROLOGY | Facility: CLINIC | Age: 68
End: 2023-02-01

## 2023-02-01 NOTE — TELEPHONE ENCOUNTER
[facility] Call Center    Phone Message    May a detailed message be left on voicemail: yes     Reason for Call: Order(s): Start of care order  Reason for requested: Juan Francisco with [facility] called stating he received catheter exchange orders but he also needs start of care orders faxed to [phone]. He will be going to the patients home on Friday 2/3/23. Please contact Juan Francisco with any questions or when this is complete at [phone]. Thank you  Date needed: ASAP  Provider name: Sloan    Action Taken: Message routed to:  [facility] Urology    Travel Screening: Not Applicable

## 2023-02-03 ENCOUNTER — MEDICAL CORRESPONDENCE (OUTPATIENT)
Dept: HEALTH INFORMATION MANAGEMENT | Facility: CLINIC | Age: 68
End: 2023-02-03

## 2023-02-03 ENCOUNTER — TELEPHONE (OUTPATIENT)
Dept: UROLOGY | Facility: CLINIC | Age: 68
End: 2023-02-03
Payer: COMMERCIAL

## 2023-02-03 NOTE — PROGRESS NOTES
[facility]    Hospitalist Progress Note    Assessment & Plan   Bhavani White is a 64 year old female who was admitted on 1/25/2020.  Patient had a long stay at [facility] and was discharged on 21 January, reported back on 26th May due to increased abdominal abscess.    Complex anterior upper abdominal fluid collections, recent discharge on January 21, 2020 status post perforated duodenal ulcer status post exploratory laparotomy primary closure delayed on January 1, 2020: Patient with recent discharge on January 21 of 2020 for a perforated duodenal ulcer and associated hemorrhagic shock and bowel ischemia status post exploratory laparotomy with drain placement and wound VAC.  Per report, patient with GJ tube and yellow discharge leaking around the site this afternoon prior to admission.  However, staff at facility noted more discharge and decided to call EMS, patient sent to the emergency department for further evaluation and treatment.  CT on admission shows complex anterior upper abdominal fluid collections more loculated than previous, likely developing abscesses with increased volume, communication with left upper quadrant collection to the multiloculated anterior epigastric collection, increased ascites, pleural effusions, suprapubic bladder catheter and percutaneous jejunostomy, see report for further details.  - Appreciate surgery input, consulted interventional radiology, she underwent additional drain placements 1/27  - Continue IV Vancomycin and Zosyn. Micafungin added 1/30. ID following.  -Patient has MRSA in the cultures, H parainfluenza, enterococcus, strep, Candida glabrata are the other organisms which is currently been covered.  - CT A/P 1/31 showed decrease in fluid collections  -IR consult  pending following GJ closure tube base being broken on 1/31  - dilaudid PCA for better pain control.  Patient complains of increased pain, PCA adjusted  -Patient had diet advanced to  show full liquid 2/2, it seems that she is not tolerating that well, her G-tube is to gravity now and she is going back on clear liquid diet, possibly that will help with her pain control.  -Patient had a new PICC line placed on 1/31.    Acute on chronic constipation: pt says she normally only has a BM about once a week. It had been over a week as of today.  -Patient had multiple bowel movements 1/31, denies any increased pain.    Cardiac, hyperlipidemia, hypertension: Patient maintained on statin prior to admission.  Previous echocardiogram with EF at 60 to 65%, right ventricular global function probably normal, per report.  -Continue simvastatin and amlodipine  - pt is up 16 lbs since admission. Diuresing but very carefully given Vanc/Zosyn and risk of kidney injury.  -Monitor BMP    Pseudomonas in urine: defer to ID whether this is colonizer vs pathogenic. 50-100k organisms.  --Patient is already on Zosyn     Diabetes mellitus, insulin-dependent: Patient maintained on Lantus prior to admission. Good glycemic control here.  -Continue prior to admission Lantus 27 units subcutaneous every morning  -Insulin sliding scale, medium.     Hypothyroidism: Stable.  -Continue prior to admission levothyroxine      Chronic pain, on chronic methadone, anxiety, depression: Patient maintained on methadone prior to admission.  -PTA medications were restarted including gabapentin, methadone and Cymbalta.     GERD: Stable.  -Continue PPI     Insomnia: Stable.  -Continue prior to admission trazodone    DVT Prophylaxis: sequential compression device   Code Status: Full Code     Disposition: anticipate prolonged hospitalization    Grisel Chicas MD    Interval History   Patient complains about left flank pain, she does not want to sit up in a chair, her diet was advanced to full liquid and she was tolerating diet but the pain has got worse and, there is discharge from the drains noted, her G-tube is to gravity, denies any chest  pain.    -Data reviewed today: I reviewed all new labs and imaging results over the last 24 hours.     Physical Exam   Temp: 98.2  F (36.8  C) Temp src: Oral BP: 116/65   Heart Rate: 76 Resp: 16 SpO2: 93 % O2 Device: Nasal cannula Oxygen Delivery: 2 LPM  Vitals:    01/27/20 0600 01/30/20 0651 01/31/20 0610   Weight: 121.8 kg (268 lb 8.3 oz) 120.7 kg (266 lb) 134.7 kg (297 lb)     Vital Signs with Ranges  Temp:  [96.4  F (35.8  C)-98.9  F (37.2  C)] 98.2  F (36.8  C)  Heart Rate:  [72-82] 76  Resp:  [16] 16  BP: (116-131)/(55-71) 116/65  SpO2:  [92 %-97 %] 93 %  I/O last 3 completed shifts:  In: -   Out: 5340 [Urine:4800; Emesis/NG output:350; Drains:190]    Constitutional: Awake, alert, cooperative, no apparent distress. Morbidly obese.  Respiratory: Basilar crackles noted  Cardiovascular: Regular rate and rhythm, normal S1 and S2, and no murmur noted  GI: G-tube noted suprapubic catheter noted, she has healing incision, has multiple THAI drains placed by IR, bowel sounds are reduced.  Skin/Integumen: No rashes, no cyanosis, 1+ edema  Neuro : moving all 4 extremities, no focal deficit noted     Medications     dextrose       HYDROmorphone       sodium chloride 10 mL/hr at 02/02/20 1403       amLODIPine  5 mg Oral or Feeding Tube Daily     DULoxetine  120 mg Oral QAM     furosemide  40 mg Oral Daily     gabapentin  1,600 mg Oral or Feeding Tube TID     insulin aspart  1-6 Units Subcutaneous Q4H     insulin glargine  27 Units Subcutaneous QAM AC     ipratropium - albuterol 0.5 mg/2.5 mg/3 mL  3 mL Nebulization 4x daily     levothyroxine  88 mcg Oral or Feeding Tube QAM AC     methadone  20 mg Oral or Feeding Tube BID    Or     methadone  20 mg Oral BID     micafungin  100 mg Intravenous Q24H     pantoprazole  40 mg Oral BID AC    Or     pantoprazole  40 mg Oral or Feeding Tube BID AC     piperacillin-tazobactam  3.375 g Intravenous Q6H     polyethylene glycol  17 g Oral Daily     sennosides  2 tablet Oral BID      simvastatin  20 mg Oral or Feeding Tube At Bedtime     sodium chloride (PF)  10 mL Intracatheter Q8H     sodium chloride (PF)  10 mL Irrigation Q8H     sodium chloride (PF)  3 mL Intracatheter Q8H     traZODone  100 mg Oral At Bedtime     vancomycin (VANCOCIN) IV  2,000 mg Intravenous Q24H       Data   Recent Labs   Lab 02/03/20  0600 02/02/20  0550 02/01/20  0615 01/31/20  0812   WBC 16.1* 16.0*  --  17.7*   HGB 8.8* 9.8*  --  9.6*   MCV 89 88  --  88    429  --  387    138  --  138   POTASSIUM 3.8 3.7  --  3.6   CHLORIDE 97 96  --  98   CO2 42* 41*  --  39*   BUN 19 19  --  21   CR 0.53 0.49* 0.52 0.51*   ANIONGAP <1* 1*  --  1*   DORY 9.5 9.5  --  9.5   * 134*  --  143*     Recent Labs   Lab 02/03/20  0600 02/03/20  0326 02/02/20  2318 02/02/20  1949 02/02/20  1608 02/02/20  0550 02/02/20  0331  01/31/20  0812  01/30/20  0726   *  --   --   --   --  134*  --   --  143*  --  143*   BGM  --  121* 120* 125* 172*  --  137*   < >  --    < >  --     < > = values in this interval not displayed.       Imaging:   No results found for this or any previous visit (from the past 24 hour(s)).

## 2023-02-03 NOTE — TELEPHONE ENCOUNTER
[facility] Call Center    Phone Message    May a detailed message be left on voicemail: yes     Reason for Call: Order(s): Home Care Orders: Skilled Nursing:  skilled nursing one time every week for 4 weeks with prn and behavioral health eval one time    Verbal orders to Juan Francisco from [facility] [phone]. Preferred if he could have them on Monday. Thank you.    Action Taken: Message routed to:  [facility] Urology    Travel Screening: Not Applicable

## 2023-02-06 NOTE — CONFIDENTIAL NOTE
Message left returning call. Caller notified that orders have been faxed, and I am unsure what is needed further. Message included a request for call back with my direct line.    Ivet Snow CMA  02/06/23  4:44 PM

## 2023-02-07 RX ORDER — ROSUVASTATIN CALCIUM 20 MG/1
TABLET, COATED ORAL
Qty: 90 TABLET | Refills: 3 | Status: SHIPPED | OUTPATIENT
Start: 2023-02-07 | End: 2023-01-01

## 2023-02-07 NOTE — TELEPHONE ENCOUNTER
I left another message for pt to call back to discuss her lipid panel results from 1/24/23 and verify what dose of crestor she is taking as her numbers have increased. Raciel DESIR February 7, 2023, 2:18 PM

## 2023-02-21 NOTE — TELEPHONE ENCOUNTER
[facility] Call Center    Phone Message    May a detailed message be left on voicemail: yes     Reason for Call: Other: .   Pt calling regarding VV post op appt with Sloan regarding her procedure in Nov. Pt is needing to reschedule to first week of March. Please call pt     Action Taken: Message routed to:  Other: uro    Travel Screening: Not Applicable

## 2023-02-23 NOTE — TELEPHONE ENCOUNTER
Reason for visit: Symptom check      Relevant information: botox    Records/imaging/labs/orders: all records available    Pt called: no need for a call      Ivet Snow CMA  2/23/2023  11:44 AM

## 2023-02-27 NOTE — LETTER
March 7, 2023      Bhavani White  [address] [place] [address] [place]   [facility] [address]      Dear Bhavani,    We recently received a call from your pharmacy requesting a refill of your medication.    A review of your chart indicates that an appointment is required with your provider.  Please call the clinic to schedule your appointment.    We have authorized one refill of your medication to allow time for you to schedule.   If you have a history of diabetes or high cholesterol, please come in fasting for the appointment. Fasting entails nothing to eat or drink 8 hours prior to your appointment; with the exception on water. You may take your medication the day of the appointment.    Thank you,      Karen Santos MD

## 2023-02-28 NOTE — PROGRESS NOTES
2/27/23 I agree, LDL is up, but let's confirm which dose of crestor she takes before we increase it. EE    ======================    2/28/23 Called to patient and left a message to call back.  Ekta Woodard RN 02/28/23 9:40 AM

## 2023-02-28 NOTE — TELEPHONE ENCOUNTER
Medication is being filled for 1 time refill only due to:  Patient needs to be seen because due for asthma follow up.   Please call to schedule appointment.    Norma Monique RN

## 2023-03-01 NOTE — TELEPHONE ENCOUNTER
Routing refill request to provider for review/approval because:  Overdue for recheck  ACT Total Scores 3/2/2022 11/14/2022 12/2/2022   ACT TOTAL SCORE - - -   ASTHMA ER VISITS - - -   ASTHMA HOSPITALIZATIONS - - -   ACT TOTAL SCORE (Goal Greater than or Equal to 20) 19 22 24   In the past 12 months, how many times did you visit the emergency room for your asthma without being admitted to the hospital? 0 0 0   In the past 12 months, how many times were you hospitalized overnight because of your asthma? 0 0 0

## 2023-03-01 NOTE — TELEPHONE ENCOUNTER
Routing refill request to provider for review/approval because:  Labs not current:    TSH   Date Value Ref Range Status   03/02/2022 1.56 0.40 - 4.00 mU/L Final   03/19/2021 0.61 0.40 - 4.00 mU/L Final   Patient was seen and has appts scheduled-ok to fill until appointment? And do labs then, please place orders.    Norma Monique RN

## 2023-03-02 NOTE — TELEPHONE ENCOUNTER
I am surprised that her LDL is so high on 20 mg of Crestor, but if she has been taking it consistently, we should increase the dose to 40 mg a day.  Recheck lipid panel in 6 weeks. EE

## 2023-03-27 NOTE — TELEPHONE ENCOUNTER
Looks like she is overdue for diabetic check and labs.   Its been over a year since I have seen her

## 2023-03-30 NOTE — TELEPHONE ENCOUNTER
"Pt is scheduled to \"establish care\" on 4/3/23 in [place].  Mena Caruso, [facility]    "

## 2023-04-03 NOTE — TELEPHONE ENCOUNTER
The Home Care/Assisted Living/Nursing Facility is calling regarding an established patient.  Has the patient seen Home Care in the past or is currently residing in Assisted Living or Nursing Facility? Yes.     Wojciech calling from [facility] requesting the following orders that are within the Home Care, Assisted Living or Nursing Home Eval and Treatment standing order and can be signed as standing order signature required by RN.    Preferred Call Back Number: [phone]    Home Care Visits Continuation    Any additional Orders:  Are there any orders requested, not stated above, that are outside of the standing order and must be routed to a licensed practitioner for approval?    No  SNV once every 4 weeks for 9 weeks with 2 prn's for cath cares and changes    Writer has verified Requestor will send fax to have orders signed.    Thank you  Donovan Sifuentes RN on 4/3/2023 at 2:21 PM

## 2023-04-12 PROBLEM — Z78.0 ASYMPTOMATIC POSTMENOPAUSAL STATUS: Status: ACTIVE | Noted: 2023-01-01

## 2023-04-12 PROBLEM — Z12.31 ENCOUNTER FOR SCREENING MAMMOGRAM FOR BREAST CANCER: Status: ACTIVE | Noted: 2023-01-01

## 2023-04-12 PROBLEM — Z12.11 SCREEN FOR COLON CANCER: Status: ACTIVE | Noted: 2023-01-01

## 2023-04-12 NOTE — PROGRESS NOTES
"Bhavani is a 67 year old who is being evaluated via a billable video visit.      How would you like to obtain your AVS? MyChart  If the video visit is dropped, the invitation should be resent by: Text to cell phone: [phone]  Will anyone else be joining your video visit? No        Assessment & Plan     Encounter for screening mammogram for breast cancer  - MA SCREENING DIGITAL BILAT - Future  (s+30); Future    Type 2 diabetes mellitus with diabetic nephropathy, without long-term current use of insulin (H)  Most recent A1c 5.8% in Nov 2022.   She takes metformin 500 mg bid.   She is due for diabetic foot exam.     Screen for colon cancer  - Colonoscopy Screening  Referral; Future    Paraplegia (H)  Wheelchair bound since her injury in 1981.     Other cystostomy status (H)  She has a suprapubic catheter and follows with urology for recurrent UTI and bladder botox injections.    Chronic diastolic (congestive) heart failure (H)  Currently on guideline directed therapy with lisinopril 2.5 mg daily, atorvastatin and asa.   She is not on BB - per her previous pcp.    Recurrent major depressive disorder, in full remission (H)  Stable. On Cymbalta, continue medication. No SI reported today,    Morbid obesity (H)  Will defer to next visit.    Methadone maintenance therapy patient (H)  Via Pain clinic.     Moderate persistent asthma without complication  Stable. Continue breo ellipta daily and rescue inhaler as needed.     Asymptomatic postmenopausal status  - DEXA HIP/PELVIS/SPINE - Future; Future     BMI:   Estimated body mass index is 40.25 kg/m  as calculated from the following:    Height as of 11/18/22: 1.651 m (5' 5\").    Weight as of 11/18/22: 109.7 kg (241 lb 13.5 oz).       See Patient Instructions    ESTHER FORD MD  [facility]    Subjective   Bhavani is a 67 year old, presenting for the following health issues:  Establish Care    HPI     Bhavani is a very pleasant 67-year-old lady who had " Patient ID: Renata is a 17 year old female.    Chief Compliant  Chief Complaint   Patient presents with   • Vomiting     Pt. Present for nausea, vomit once 9/29/2022, generalized abdominal pain for x1 month.       HPI    16 yo F with hx PCOS and FSGS presents for a week of abdo /epigastric pain, nausea and 1 episode of vomiting yesterday  Pt denies any diarrhea or constipation. She has a good po intake   Pt takes metformin for PCOS and was started on tacrolimus 2 weeks ago.      Review of Systems:  Review of Systems   Gastrointestinal: Positive for abdominal pain, nausea and vomiting.   All other systems reviewed and are negative.      Physical Exam:  Physical Exam  Constitutional:       Appearance: Normal appearance.   HENT:      Right Ear: Tympanic membrane, ear canal and external ear normal.      Left Ear: Tympanic membrane, ear canal and external ear normal.      Mouth/Throat:      Mouth: Mucous membranes are moist.      Pharynx: Oropharynx is clear.   Cardiovascular:      Rate and Rhythm: Normal rate and regular rhythm.      Pulses: Normal pulses.      Heart sounds: Normal heart sounds.   Pulmonary:      Effort: Pulmonary effort is normal.      Breath sounds: Normal breath sounds.   Abdominal:      General: Bowel sounds are normal.      Palpations: Abdomen is soft.      Tenderness: There is abdominal tenderness. There is guarding and rebound.   Neurological:      Mental Status: She is alert.           Vitals  Visit Vitals  BP (!) 99/57 (BP Location: LUE - Left upper extremity, Patient Position: Sitting, Cuff Size: Regular)   Pulse 60   Temp 97.8 °F (36.6 °C) (Oral)   Resp 18   Ht 5' 1\" (1.549 m)   Wt 73 kg (160 lb 15 oz)   LMP 09/04/2022   SpO2 98%   BMI 30.41 kg/m²       Allergies   ALLERGIES:   Allergen Reactions   • Amoxicillin HIVES     Family History   Family History   Problem Relation Age of Onset   • Patient is unaware of any medical problems Mother    • Patient is unaware of any medical problems Father     • Other Sister         GLOBAL DEV. DELAY   • Asthma Sister    • Diabetes Paternal Grandmother    • Diabetes Paternal Grandfather      Meds  Outpatient Current Medications as of as of 2022       Disp Refills Start End    TACROlimus (PROGRAF) 1 MG capsule (Taking) 60 capsule 5 9/15/2022     Sig - Route: Take 1 capsule by mouth 2 times daily. - Oral    Class: Eprescribe    metFORMIN (GLUCOPHAGE) 500 MG tablet (Taking) 120 tablet 11 9/15/2022     Sig - Route: Take 2 tablets by mouth 2 times daily (with meals). - Oral    Class: Eprescribe    Alcohol Swabs (B-D SINGLE USE SWABS REGULAR) Pads (Taking)   10/1/2021     Si times daily.    Class: Historical Med    Arnuity Ellipta 100 MCG/ACT inhaler (Taking) 30 each 3 9/10/2021     Sig - Route: INHALE 1 PUFF INTO THE LUNGS DAILY - Inhalation    Class: Eprescribe    OneTouch Delica Lancets 33G Misc (Taking) 100 each 11 2021     Sig: Use to check BG twice per day: fasting and 2 hours after dinner    Class: Eprescribe    amphetamine-dextroamphetamine (Adderall) 20 MG tablet (Taking) 30 tablet 0 10/30/2020     Sig - Route: Take 1 tablet by mouth daily. Do not start before 2020. - Oral    Class: Eprescribe    Earliest Fill Date: 10/30/2020    amphetamine-dextroamphetamine (Adderall) 20 MG tablet 30 tablet 0 2022 6/10/2022    Sig - Route: Take 1 tablet by mouth daily. - Oral    Class: Eprescribe    Earliest Fill Date: 2022    cetirizine (ZyrTEC) 10 MG tablet 30 tablet 3 9/10/2021 9/10/2022    Sig - Route: TAKE 1 TABLET BY MOUTH DAILY - Oral    Class: Eprescribe    fluticasone (FLONASE) 50 MCG/ACT nasal spray 16 g 11 9/10/2021 9/15/2022    Sig: SHAKE LIQUID AND USE 2 SPRAYS IN EACH NOSTRIL DAILY    Class: Eprescribe    lisinopril (ZESTRIL) 2.5 MG tablet 30 tablet 11 2021 9/15/2022    Sig - Route: Take 1 tablet by mouth daily. - Oral    Class: Eprescribe    blood glucose (OneTouch Verio) test strip 50 strip 11 2021     Sig: Test blood  a virtual visit today.  She is establishing care with me today.  She has extensive medical history of type 2 diabetes, paraplegia, congestive heart failure, depression, asthma, history of narcotic abuse currently on methadone via pain clinic. Chronic pain hx and she was injured in 1981.  Hx of ovarian tumor s/p total hysterectomy and b/l oophorectomy.   She also had bowel perforation and had surgery for that.  Now she has multiple hernias.  She has hx of recurrent UTI and has a suprapubic cathter, has hx of botox injections.  All medications were reviewed.     Review of Systems       Objective    Vitals - Patient Reported  Pain Score: Moderate Pain (5)  Pain Loc: Low Back        Physical Exam   GEN: No acute distress  RESP: No audible increased work of breathing. Patient speaking in full sentences without distress.  PSYCH: pleasant  Exam otherwise limited due to virtual platform          Video-Visit Details    Type of service:  Video Visit   Video Start Time: 12: 40 pm  Video End Time:12: 58 pm    Originating Location (pt. Location): Home  Distant Location (provider location):  On-site  Platform used for Video Visit: [facility]     sugar 2 times daily: fasting and 2 hours after dinner meal    Class: Eprescribe    Notes to Pharmacy: 25 day script    albuterol 108 (90 Base) MCG/ACT inhaler 36 g 2 9/30/2020 9/30/2021    Sig - Route: Inhale 4 puffs into the lungs every 4 hours. - Inhalation    Class: Eprescribe    albuterol (VENTOLIN) (2.5 MG/3ML) 0.083% nebulizer solution 75 mL 1 5/20/2020 5/22/2020    Sig - Route: Take 3 mLs by nebulization every 4 hours as needed for Wheezing. - Nebulization    Class: Eprescribe        Social History  Social History     Socioeconomic History   • Marital status: Single     Spouse name: Not on file   • Number of children: Not on file   • Years of education: Not on file   • Highest education level: Not on file   Occupational History   • Not on file   Tobacco Use   • Smoking status: Never Smoker   • Smokeless tobacco: Never Used   Vaping Use   • Vaping Use: never used   Substance and Sexual Activity   • Alcohol use: Never   • Drug use: Never   • Sexual activity: Never   Other Topics Concern   • Not on file   Social History Narrative   • Not on file     Social Determinants of Health     Financial Resource Strain: Not on file   Food Insecurity: Not on file   Transportation Needs: Not on file   Physical Activity: Not on file   Stress: Not on file   Social Connections: Not on file   Intimate Partner Violence: Not on file       Assessment & Plan  Renata was seen today for vomiting.    Diagnoses and all orders for this visit:    Nausea and vomiting, unspecified vomiting type    Gastroesophageal reflux disease without esophagitis  -     famotidine (PEPCID) 20 MG tablet; Take 1 tablet by mouth 2 times daily as needed (reflux/heartburn).    Other orders  -     ondansetron (ZOFRAN ODT) 4 MG disintegrating tablet; Place 1 tablet onto the tongue every 8 hours as needed for Nausea.      # educated pt Sx might be related to metformin/tacrolimus side effects  # advised taking famotidine bid per orders, zofran as needed for  nausea  #f/u with pcp next week

## 2023-04-12 NOTE — TELEPHONE ENCOUNTER
Birth certificate has been completed.   Received 8 page fax for Home Health Certification and Plan of Care for Dr Nguyễn to complete. Form in the in-basket at AA's desk.

## 2023-04-12 NOTE — PATIENT INSTRUCTIONS
You are due for mammogram.  Please call the following number to make appointment :  [phone]  It is located in [address]

## 2023-04-18 NOTE — TELEPHONE ENCOUNTER
Orders in pcp basket- sign and fax to [phone]    [facility]   [facility]

## 2023-05-02 NOTE — TELEPHONE ENCOUNTER
Forms/Letter Request    Type of form/letter: Received [facility] Physician order [id number]      Have you been seen for this request:     Do we have the form/letter: Yes      Who is the form from? [facility] Physician order [id number]      Where did/will the form come from? Via fax    Please fax to: [phone]    EMELY Rudolph

## 2023-05-04 NOTE — TELEPHONE ENCOUNTER
Reason for visit: Symptom check            Relevant information: botox     Records/imaging/labs/orders: all records available     Pt called: no need for a call      Ivet Snow CMA  5/4/2023  10:08 AM

## 2023-05-15 NOTE — LETTER
5/15/2023       RE: Bhavani White  [address]  [place] [address]     Dear Colleague,    Thank you for referring your patient, Bhavani White, to the [facility]OLOGY [facility] [place] at [facility]. Please see a copy of my visit note below.    HPI:  Bhavani White is a 67 year old female being seen for neurogenic bladder  From spinal stenosis. Has a few years of SPT. Has spasms and leakage around SPT despite oxybutynin 15. Has benefitted from Botox injection in the past, last 11/2022. Has been done in OR because she says the first time she tried it (elsewhere) it was painful in the clinic.     Having spasms again and would like to schedule Botox injection    Exam:  There were no vitals taken for this visit.  GENERAL: Healthy, alert and no distress  EYES: Eyes grossly normal to inspection.  No discharge or erythema, or obvious scleral/conjunctival abnormalities.  RESP: No audible wheeze, cough, or visible cyanosis.  No visible retractions or increased work of breathing.    SKIN: Visible skin clear. No significant rash, abnormal pigmentation or lesions.  NEURO: Cranial nerves grossly intact.  Mentation and speech appropriate for age.  PSYCH: Mentation appears normal, affect normal/bright, judgement and insight intact, normal speech and appearance well-groomed.    Review of Imaging:  The following imaging exams were independently viewed and interpreted by me and discussed with patient:  MRI Abd/Pelvis: Normal -- one small left renal cyst (simple)    Review of Labs:  The following labs were reviewed by me and discussed with the patient:  No results found for this or any previous visit (from the past 720 hour(s)).      Assessment & Plan   neurogenic bladder   Bladder spasms  Plan Botox in clinic with flex cysto through SPT opening. Give Valium and Oxycodone  1 hour before. Discussed risks of pain, infection, bleeding    MD BOBBY Bolanos  [facility] [place]      ==========================      Additional Coding Information:    Problems:  3 -- one stable chronic illness    Data Reviewed    Independent interpretation of a test performed by another physician/other qualified health care professional (not separately reported) - MRI      Level of risk:  4 -- minor surgery with patient or procedure risks                Virtual Visit Details    Type of service:  Video Visit   Video Start Time: 8:16  Video End Time:8:30 AM    Originating Location (pt. Location): Home  Distant Location (provider location):  Off-site  Platform used for Video Visit: [facility]

## 2023-05-15 NOTE — PROGRESS NOTES
HPI:  Bhavani White is a 67 year old female being seen for neurogenic bladder  From spinal stenosis. Has a few years of SPT. Has spasms and leakage around SPT despite oxybutynin 15. Has benefitted from Botox injection in the past, last 11/2022. Has been done in OR because she says the first time she tried it (elsewhere) it was painful in the clinic.     Having spasms again and would like to schedule Botox injection    Exam:  There were no vitals taken for this visit.  GENERAL: Healthy, alert and no distress  EYES: Eyes grossly normal to inspection.  No discharge or erythema, or obvious scleral/conjunctival abnormalities.  RESP: No audible wheeze, cough, or visible cyanosis.  No visible retractions or increased work of breathing.    SKIN: Visible skin clear. No significant rash, abnormal pigmentation or lesions.  NEURO: Cranial nerves grossly intact.  Mentation and speech appropriate for age.  PSYCH: Mentation appears normal, affect normal/bright, judgement and insight intact, normal speech and appearance well-groomed.    Review of Imaging:  The following imaging exams were independently viewed and interpreted by me and discussed with patient:  MRI Abd/Pelvis: Normal -- one small left renal cyst (simple)    Review of Labs:  The following labs were reviewed by me and discussed with the patient:  No results found for this or any previous visit (from the past 720 hour(s)).      Assessment & Plan   1. neurogenic bladder   2. Bladder spasms  3. Plan Botox in clinic with flex cysto through SPT opening. Give Valium and Oxycodone  1 hour before. Discussed risks of pain, infection, bleeding    Yoandy Rios MD  [facility] [place]      ==========================      Additional Coding Information:    Problems:  3 -- one stable chronic illness    Data Reviewed    Independent interpretation of a test performed by another physician/other qualified health care professional (not separately  reported) - MRI      Level of risk:  4 -- minor surgery with patient or procedure risks                Virtual Visit Details    Type of service:  Video Visit   Video Start Time: 8:16  Video End Time:8:30 AM    Originating Location (pt. Location): Home  Distant Location (provider location):  Off-site  Platform used for Video Visit: [facility]

## 2023-05-15 NOTE — NURSING NOTE
Is the patient currently in the state of [place]? YES    Visit mode:VIDEO    If the visit is dropped, the patient can be reconnected by: VIDEO VISIT: Text to cell phone: [phone]    Will anyone else be joining the visit? NO      How would you like to obtain your AVS? MyChart    Are changes needed to the allergy or medication list? NO    Reason for visit: Video Visit (Follow-up for Neurogenic bladder)

## 2023-05-21 NOTE — TELEPHONE ENCOUNTER
Telephone Encounter  Author: Lily James MD    Date: 3:28 PM; 5/21/2023  Patient Name: Bhavani White  MRN: [id number]    Paged by  that Bhavani M Larson called requesting to speak with urology on call. Per chart review, she is a 67 year old female with neurogenic bladder from spinal stenosis, managed with SPT, last seen in clinic with Dr. Rios on 5/15/23. At this appointment, she reported worsening bladder spasms despite oxybutynin ER 15mg. She has received Botox injections in the past, which have improved her symptoms. She is scheduled for repeat Botox injections in August.      I contacted the patient.  She reports that she has been having bladder spasms with leakage per urethra and around her SPT. She is concerned for UTI. She denies recent fevers, chills, nausea, vomiting, poor PO intake, flank pain, or decreased UOP. Her SPT is draining appropriately. She had a urine culture completed on 5/19/23, which demonstrated growth of multiple organisms without predominant genotype. I discussed with the patient that this result is more indicative of chronic colonization as opposed to an acute infection and that treating this culture with antibiotics increases the risk of multidrug resistant infections in the future. I reviewed symptoms of a true infection and reviewed return precautions. She expressed understanding of this.     She also had a question about how to address her worsening bladder spasms, given that her Botox injections aren't scheduled until August. I reviewed bladder irritants with her and stressed the importance of adequate hydration. Briefly reviewed additional antispasmodics as well, and recommended that she reach out to Dr. Rios's clinic via phone or MyChart to inquire about adding a new medication. She was agreeable to this.   --    3:28 PM; 5/21/2023

## 2023-05-22 NOTE — PROGRESS NOTES
Rx for myrbetriq 25 mg # 60 with 3 refills was provided for pt today.  Writer notified pt via my chart message.    Herlinda Castillo RN

## 2023-05-25 NOTE — PROGRESS NOTES
Rx for bactrim DS BID x 7 days sent in for pt today.  She was notified via my chart message.  Will follow-up if she is able to complete UCX or not.    Herlinda Castillo RN

## 2023-06-02 NOTE — TELEPHONE ENCOUNTER
Home Care is calling regarding an established patient with [facility].       Requesting orders from: Hailee Henry  Provider is following patient: Yes  Is this a 60-day recertification request?  Yes    Orders Requested    Skilled Nursing  Request for recertificationFrequency: 1x wk for 4 wks and then 2 PRN's    Information was gathered and will be sent to provider for review.  RN will contact Home Care with information after provider review.  Confirmed ok to leave a detailed message with call back.  Contact information confirmed and updated as needed.    Mia Salazar RN

## 2023-06-06 NOTE — TELEPHONE ENCOUNTER
TO PCP        Home Care is calling regarding an established patient with [facility].       Requesting orders from: Hailee Henry  Provider is following patient: Yes  Is this a 60-day recertification request?  Yes    Orders Requested    Skilled Nursing  Request for recertification   Frequency: 1x/wk for 4 wks and 2 PRN visits   For catheter care and PRN visits for cath care or ua/uc collections.      Confirmed ok to leave a detailed message with call back.  Contact information confirmed and updated as needed.    Mia Salazar RN

## 2023-06-26 NOTE — TELEPHONE ENCOUNTER
Home Care Contact    Attempt # 1    Was call answered?  Yes. Writer relayed PCP's message below, home care RN expressed verbal understanding.    Lisa Kaur, RN  -[facility]

## 2023-07-03 NOTE — TELEPHONE ENCOUNTER
Home Care is calling regarding an established patient with [facility].       Requesting orders from: Hailee Henry  Provider is following patient: Yes  Is this a 60-day recertification request?  No    Orders Requested    Skilled Nursing  Request for continuation of care  Frequency: Just needs additional 3 PRN visits for catheter care trouble shooting. Forgot to add on orders.     Writer gave VO per protocol.      Confirmed ok to leave a detailed message with call back.  Contact information confirmed and updated as needed.    Mia Salazar RN

## 2023-07-11 NOTE — TELEPHONE ENCOUNTER
Routing refill request to provider for review/approval because:  90 day supply was approved 7/11/23

## 2023-07-12 NOTE — TELEPHONE ENCOUNTER
Wojciech from [facility] Care calling with update/FYI for PCP stating that pt will be discharging with [facility] next Tuesday and will be starting with new HC agency on Wednesday 7/19.    Negrita LANCE RN  [facility] Triage Team

## 2023-07-19 NOTE — TELEPHONE ENCOUNTER
Called back to Sandra, was transferred to nurse Ash. Left a detailed message giving verbal on requested home care orders    Lola OROURKE, Triage RN  [facility]

## 2023-07-19 NOTE — TELEPHONE ENCOUNTER
Sandra calling from [facility] calling to request verbal orders for the following:    Start of care for skilled nursing (patient was previously seen through [facility] and now requested skilled nursing through [facility])     Routing to PCP to please review and advise on requested home care orders - thank you!     Callback to Sandra [phone] - confidential VM (ok to leave detailed VM)     Duran Martinez RN  [facility]

## 2023-07-26 NOTE — TELEPHONE ENCOUNTER
Patient is needing a MYRON becker for wheelchair she states this needs to be submitted to her insurance [employer]    Routing to provider first so DME order can be placed

## 2023-07-27 NOTE — TELEPHONE ENCOUNTER
Called patient regarding PCP message below. She wants DME faxed to insurance company. She will look up fax number and call clinic back.     Mia Salazar RN on 7/27/2023 at 2:51 PM

## 2023-07-28 NOTE — TELEPHONE ENCOUNTER
See mychart encounter on 7/27/23.    Addressed in that encounter.    Closing this encounter.    Duran Martinez RN  [facility]

## 2023-08-08 NOTE — TELEPHONE ENCOUNTER
Gabriela with [facility] called stating the pt recently saw PCP and the office visit note was supposed to be faxed to them. They have not received this. Pt wanted provider there consulted for incisional hernia. Routing to team to assist with faxing visit note from 8/3/23.    Fax: [phone]    Clara GALVAN RN  [facility]

## 2023-08-08 NOTE — TELEPHONE ENCOUNTER
8-3-23 with Dr. Henry was a Virtual Visit, not an in-person office visit.   Refaxed the  8-3-23 VIRTUAL VISIT notes to both:   Gabriela with [facility] [phone]   Dr. Jacobsen , an [facility] [phone]      Should be visible to [facility] thru Care Everywhere.   I can see the [facility] Telephone documentation 8-4-23

## 2023-08-09 NOTE — TELEPHONE ENCOUNTER
This has been faxed multiple times.    We can view [facility]'s messages thru Care Everywhere, they should be able to view our info.    I think they are looking for an office visit  but 8-3-23 was a Virtual Visit.      Marzena ROGEL MA

## 2023-08-15 NOTE — TELEPHONE ENCOUNTER
oxybutynin ER (DITROPAN XL) 15 MG 24 hr tablet     30 tablet 11 8/12/2022       5/15/2023  [facility] Yoandy Mcdonnell MD  Urology

## 2023-08-15 NOTE — TELEPHONE ENCOUNTER
Reason for visit: Cystoscopy with bladder botox      Relevant information: neurogenic bladder    Records/imaging/labs/orders: all records available    Pt called: no need for a call    At Rooming: empty bladder 200 units - verify mix    Ivet Snow CMA  8/15/2023  4:00 PM

## 2023-08-23 NOTE — TELEPHONE ENCOUNTER
TO PCP:   See below, pt requesting Rx for continuous blood glucose testing     Please advise     Radhika Selby RN  [facility]

## 2023-08-25 NOTE — TELEPHONE ENCOUNTER
PCP see below, pt asking if you'll send an Rx for continuous glucometer testing instead of fingerstick?    Please advise (or should she first check with insurance to see what is covered?)   Lola OROURKE, Triage RN  [facility] Internal [facility]

## 2023-08-28 NOTE — TELEPHONE ENCOUNTER
[facility] Call Center    Phone Message    May a detailed message be left on voicemail: yes     Reason for Call: Other: Pt had to cancel Cystoscopy today, went to reschedule and provider is out until Jan- pt would like a call back to discuss options to get in sooner. Apt and wait list were declined      Action Taken: Message routed to:  [facility] ([facility] URO    Travel Screening: Not Applicable

## 2023-08-30 NOTE — TELEPHONE ENCOUNTER
Pt returned call, waiting to hear back from kristie via teams message to potentially schedule 9/25 @ 2PM

## 2023-09-05 NOTE — TELEPHONE ENCOUNTER
Scheduled pt next available in November. Pt stated she has already been waiting for 4 months and her spasms are bad, I was suppose to be able to take the hold on 9/25 but I see rik has taken spot to discuss surgery. Are these KARTHIK spot suppose to be held for botox pts? Please confirm as pt is in pain, and I was advised we cannot hold appts from my understanding. Thank you.

## 2023-09-25 NOTE — PROGRESS NOTES
Bhavani reached out to our clinic regarding concerns of UTI.  Orders entered.  Patient notified.      Karoline Paige RN   11:21 AM

## 2023-10-29 PROBLEM — T83.511A URINARY TRACT INFECTION ASSOCIATED WITH CATHETERIZATION OF URINARY TRACT, UNSPECIFIED INDWELLING URINARY CATHETER TYPE, INITIAL ENCOUNTER (H): Status: ACTIVE | Noted: 2023-01-01

## 2023-10-29 PROBLEM — A41.9 SEPSIS, DUE TO UNSPECIFIED ORGANISM, UNSPECIFIED WHETHER ACUTE ORGAN DYSFUNCTION PRESENT (H): Status: ACTIVE | Noted: 2023-01-01

## 2023-10-29 PROBLEM — N39.0 URINARY TRACT INFECTION ASSOCIATED WITH CATHETERIZATION OF URINARY TRACT, UNSPECIFIED INDWELLING URINARY CATHETER TYPE, INITIAL ENCOUNTER (H): Status: ACTIVE | Noted: 2023-01-01

## 2023-10-29 NOTE — ED NOTES
"[facility]  ED Nurse Handoff Report    ED Chief complaint: UTI      ED Diagnosis:   Final diagnoses:   Urinary tract infection associated with catheterization of urinary tract, unspecified indwelling urinary catheter type, initial encounter    Sepsis, due to unspecified organism, unspecified whether acute organ dysfunction present (H)       Code Status: Full Code    Allergies:   Allergies   Allergen Reactions    Povidone Iodine      \"mild skin irritation\" per patient report    Toradol [Ketorolac] Other (See Comments)     Pt gets nightmares    Tramadol Other (See Comments)       Patient Story: back pain with bladder spasms   Focused Assessment:  pt lives with ,  pt did have a strong order of urine but refused to undress to gown; wheelchair bound; 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  4 mm stone at ureteropelvic junction on the left with minimal if any proximal hydronephrosis.   2.  No pyelonephritis demonstrated by CT although mild pyelonephritis can be occult.   3.  Otherwise, stable exam since comparison.           Labs Ordered and Resulted from Time of ED Arrival to Time of ED Departure   COMPREHENSIVE METABOLIC PANEL - Abnormal       Result Value    Sodium 135      Potassium 4.3      Carbon Dioxide (CO2) 24      Anion Gap 16 (*)     Urea Nitrogen 25.2 (*)     Creatinine 0.94      GFR Estimate 67      Calcium 11.6 (*)     Chloride 95 (*)     Glucose 164 (*)     Alkaline Phosphatase 105 (*)     AST 32      ALT 42      Protein Total 8.3      Albumin 4.5      Bilirubin Total 0.6     ISTAT GASES LACTATE VENOUS POCT - Abnormal    Lactic Acid POCT 2.5 (*)     Bicarbonate Venous POCT 27      O2 Sat, Venous POCT 56 (*)     pCO2 Venous POCT 36 (*)  " "   pH Venous POCT 7.48 (*)     pO2 Venous POCT 27     CBC WITH PLATELETS AND DIFFERENTIAL - Abnormal    WBC Count 13.0 (*)     RBC Count 6.22 (*)     Hemoglobin 16.2 (*)     Hematocrit 49.9 (*)     MCV 80      MCH 26.0 (*)     MCHC 32.5      RDW 15.6 (*)     Platelet Count 296      % Neutrophils 89      % Lymphocytes 7      % Monocytes 3      % Eosinophils 0      % Basophils 1      % Immature Granulocytes 0      NRBCs per 100 WBC 0      Absolute Neutrophils 11.6 (*)     Absolute Lymphocytes 0.8      Absolute Monocytes 0.4      Absolute Eosinophils 0.0      Absolute Basophils 0.1      Absolute Immature Granulocytes 0.1      Absolute NRBCs 0.0     LACTIC ACID WHOLE BLOOD - Normal    Lactic Acid 1.7     ROUTINE UA WITH MICROSCOPIC REFLEX TO CULTURE   BLOOD CULTURE   BLOOD CULTURE       To be done/followed up on inpatient unit:  cont care    Does this patient have any cognitive concerns?:  no     Activity level - Baseline/Home:  Wheelchair  Activity Level - Current:   Wheelchair    Patient's Preferred language: English   Needed?: No    Isolation: Contact   Infection: MRSA  Patient tested for COVID 19 prior to admission: NO  Bariatric?: No    Vital Signs:   Vitals:    10/29/23 1215 10/29/23 1216 10/29/23 1237   BP: (!) 187/92     Pulse: 91     Resp:   20   Temp:   97.9  F (36.6  C)   SpO2:  97%    Weight:   99.8 kg (220 lb)   Height:   1.651 m (5' 5\")       Cardiac Rhythm:     Was the PSS-3 completed:   Yes  What interventions are required if any?               Family Comments: EMS was concerned about living environment and husbands ability to help pt   OBS brochure/video discussed/provided to patient/family: No              Name of person given brochure if not patient:               Relationship to patient:     For the majority of the shift this patient's behavior was Yellow.   Behavioral interventions performed were reinsurance with pain management .    ED NURSE PHONE NUMBER: *[phone]         "

## 2023-10-29 NOTE — ED NOTES
Sign Out Note     Pt accepted in sign out from: Dr. Kirk.    Briefly pt presented to the ED for: 67-year-old female presenting with increased back pain, bladder spasms.  Found to have forming a 1 mm stone at the ureteropelvic junction, recurrent UTI.     Plan at time of sign out: Admitted to hospitalist team pending urology call back.     Care of patient during my shift:     4:35 PM  I spoke to the urologist, planning for procedure tomorrow.  I rechecked the patient.  She continues to complain of pain, but does not appear toxic.    5:15 PM  Suprapubic catheter replaced.  Indication: Infection in the setting of suprapubic catheter  Description: Using sterile procedure, I 24 Ma[place] Lundberg catheter was deflated, new catheter was placed without difficulty.  8 cc of sterile water was injected into the balloon.  Catheter was secured in place with Velcro on the leg.  Free flow of urine was observed.  The patient was noted to have some leakage of urine around the catheter, which is chronic.  Patient had some discomfort during the procedure, but tolerated it well.       Plan for patient at this time: Urology will evaluate the patient in the morning.  Patient admitted to the hospital for care with the hospitalist team.     MD Skye Lozano, Ame San MD  10/30/23 0754

## 2023-10-29 NOTE — PHARMACY-ADMISSION MEDICATION HISTORY
Pharmacy Intern Admission Medication History    Admission medication history is complete. The information provided in this note is only as accurate as the sources available at the time of the update.    Information Source(s): Patient and CareEverywhere/SureScripts via in-person    Pertinent Information: Updated med list and last doses per patient's testimony.  Patient reports not taking her meds in a couple of days.   Last dose of Methadone 5 mg was yesterday.   Unsure of when she had her meds.       Changes made to PTA medication list:  Added: Furosemide, spironolactone  Deleted: None  Changed: Methadone 20 mg 2 times a day to Methadone 5 mg 3 times a day.     Allergies reviewed with patient and updates made in EHR: no    Medication History Completed By: Chalo Wynn 10/29/2023 2:58 PM    Current Facility-Administered Medications for the 10/29/23 encounter (Hospital Encounter)   Medication    Botulinum Toxin Type A (BOTOX) 200 units injection 200 Units     PTA Med List   Medication Sig Last Dose    albuterol (PROVENTIL) (2.5 MG/3ML) 0.083% neb solution USE 1 VIAL VIA NEBULIZATION EVERY 4 HOURS AS NEEDED  at prn    aspirin 81 MG EC tablet Take 81 mg by mouth daily     DULoxetine (CYMBALTA) 60 MG capsule Take 120 mg by mouth every morning     fluticasone-vilanterol (BREO ELLIPTA) 100-25 MCG/ACT inhaler INHALE 1 PUFF INTO THE LUNGS DAILY     furosemide (LASIX) 40 MG tablet Take 40 mg by mouth daily     gabapentin (NEURONTIN) 800 MG tablet Take 1,600 mg by mouth 3 times daily 0800, 1200 and 1800     levothyroxine (SYNTHROID/LEVOTHROID) 88 MCG tablet TAKE 1 TABLET(88 MCG) BY MOUTH DAILY     lisinopril (ZESTRIL) 2.5 MG tablet Take 1 tablet (2.5 mg) by mouth daily     methadone (DOLOPHINE) 5 MG tablet Take 5 mg by mouth 3 times daily 10/28/2023    naloxone (NARCAN) 4 MG/0.1ML nasal spray CALL 911. SPR CONTENTS OF ONE SPRAYER (0.1ML) INTO ONE NOSTRIL. REPEAT IN 2-3 MIN IF SYMPTOMS OF OPIOID EMERGENCY PERSIST, ALTERNATE  NOSTRILS  at prn    oxyBUTYnin ER (DITROPAN XL) 15 MG 24 hr tablet Take 1 tablet (15 mg) by mouth daily     rosuvastatin (CRESTOR) 20 MG tablet Take 1 tablet (20 mg) by mouth daily     semaglutide (OZEMPIC) 2 MG/3ML pen Inject 0.5 mg Subcutaneous every 7 days 10/22/2023    senna-docusate (SENOKOT-S/PERICOLACE) 8.6-50 MG tablet Take 2 tablets by mouth 2 times daily as needed for constipation  at prn    spironolactone (ALDACTONE) 100 MG tablet Take 100 mg by mouth daily     traZODone (DESYREL) 150 MG tablet Take 150 mg by mouth At Bedtime

## 2023-10-29 NOTE — H&P
[facility]    History and Physical - Hospitalist Service       Date of Admission:  10/29/2023    Assessment & Plan   Bhavani White is a wheelchair bound 67 year-old female with past medical history spinal stenosis, asthma, DMII, hypertension, hypothyroid, morbid obesity, sleep apnea non-compliant with CPAP, hepatic cirrhosis, chronic pain syndrome, anxiety/depression and neurogenic bladder with chronic indwelling catheter with frequent UTIs who presented to [facility] ED earlier today with concerns of urinary tract infection. She will be admitted to [facility] for IV antibiotics.    Leukocytosis  Elevated Lactic, SIRS?  Recurrent UTIs  Neurogenic bladder with chronic indwelling nguyen catheter  4 mm stone at ureteropelvic junction on the left   * Patient with history of spinal stenosis, wheelchair bound with neurogenic bladder s/p suprapubic catheter placed in 2009.  * Patient afebrile.  * Patient notes leaking around suprapubic catheter for sometime now, has not been evaluated outpatient to see if there is something that can be done.  - Exchange catheter  - Continue IV Rocephin  - CT CAP with 4 mm stone at ureteropelvic junction on the left with minimal if any proximal hydronephrosis.  No pyelonephritis demonstrated by CT although mild pyelonephritis can be occult.  - Blood cultures pending  - Continue PTA Oxybutynin 15 mg daily  - Multimodal pain regimen  - Urology consult    Chronic stable diagnoses and other pertinent medical history:  Appropriate PTA medications will be resumed  Hypertension - holding PTA Lisinopril for now - reassess tomorrow  Hyperlipidemia - continue PTA Crestor 20 mg daily  Asthma - continue PTA inhalers PRN  Depression/Anxiety - continue PTA Duloxetine 120 q am  Hypothyroid - continue PTA Synthroid 88 mcg daily  Morbid obesity - awareness, comorbidity   Sleep apnea non-compliant with CPAP - awareness  Hepatic cirrhosis - awareness    Chronic  pain syndrome/Fibromyalgia with opioid dependence  * Follows with [facility] in [place]  - Continue PTA Methadone  - Continue PTA Gabapentin 1600 mg TID (consider weaning outpatient with PCP)  - Continue PTA Trazodone 150 mg at bedtime    Chronic Diastolic Heart Failure  - Daily weights.  - Holding PTA Spironolactone 100 mg daily for now - reassess in am.  - Holding PTA Lasix for now - reassess daily.    Non-Insulin dependent type 2 diabetes  PTA Regimen: Semaglutide 0.5 q7days  Last HbA1c 5.8 on 11/15/2022  - Hypoglycemia protocol  - Preprandial and HS fingerstick checks or Q 4 hours while NPO  - Hold PTA oral antiglycemics  - Sliding scale insulin Medium  - Consistent CHO diet - 60gm per meal    Chronic kidney disease, stage 2  - Baseline Cr 1.04-1.07. On admission, 0.94.  - Avoid nephrotoxins - contrast, NSAIDs  - Renally dose medications as able/needed  - Monitor    H/O perforated duodenal ulcer 12/29/2019   * Subsequent prolonged hospitalizations for intra-abdominal infections requiring exploratory laparotomy, TRUONG, drain placement with perforation, and wound vac, readmission 3/13/20-  3/16/20 for sepsis and intra-abdominal infections and abdominal wall cellulitis which were treated with antibiotics.  - Awareness.    Diet: Moderate Consistent Carb (60 g CHO per Meal) Diet  NPO per Anesthesia Guidelines for Procedure/Surgery Except for: Meds  DVT Prophylaxis: Pneumatic Compression Devices  Lundberg Catheter: Not present  Lines: None     Cardiac Monitoring: None  Code Status: Full Code    Clinically Significant Risk Factors Present on Admission           # Hypercalcemia: Highest Ca = 11.6 mg/dL in last 2 days, will monitor as appropriate      # Drug Induced Platelet Defect: home medication list includes an antiplatelet medication   # Hypertension: Noted on problem list      # Obesity: Estimated body mass index is 36.61 kg/m  as calculated from the following:    Height as of this  "encounter: 1.651 m (5' 5\").    Weight as of this encounter: 99.8 kg (220 lb).       # Asthma: noted on problem list        Disposition Plan      Expected Discharge Date: 10/31/2023                The patient's care was discussed with the Attending Physician, Dr. Frances.    Carlee Pena PA-C  Hospitalist Service  [facility]  Securely message with V[employer] (more info)  Text page via [employer] Paging/Directory     ______________________________________________________________________    Chief Complaint   UTI    History is obtained from the patient, electronic health record, and emergency department physician    History of Present Illness   Bhavani White is a 67 year-old female with past medical history significant for asthma, DMII, hypertension, hypothyroid, morbid obesity, sleep apnea non-compliant with CPAP, chronic pain syndrome, anxiety/depression and neurogenic bladder with chronic indwelling catheter with frequent UTIs who presented to [facility] ED earlier today with concerns of urinary tract infection. She will be admitted to [facility] for IV antibiotics.    In the ED labs significant for lactic acidosis 2.5, CBC with leukocytosis, hgb 16.2. BMP grossly within normal limits.    CT CAP with 4 mm stone at ureteropelvic junction on the left with minimal if any proximal hydronephrosis.  No pyelonephritis demonstrated by CT although mild pyelonephritis can be occult.       Past Medical History    Past Medical History:   Diagnosis Date    Abdominal wall cellulitis 2020    Abdominopelvic abscess (H) 2020    Anxiety     Asthma     Depression     DM (diabetes mellitus) (H)     with peripheral neuropathy    Fracture of lower extremity 2013    Frequent UTI     History of blood transfusion     History of ulcer disease 2014    She notes from NSAIDs; nearly . Discussed a hospitalization at [facility] for this.    Hypertension     Hypothyroid     " Iatrogenic Cushing's disease (H)     off prednisone now    Infection due to alpha-hemolytic Streptococcus 02/01/2020    Morbid obesity (H)     MRSA (methicillin resistant staph aureus) culture positive 05/24/2012    repeat cx 10/12/2012 no staph mentioned.    ANIYA -- noncompliant with CPAP     Osteoarthritis     multiple joings.    Other chronic pain     Perforated duodenal ulcer -- S/P Surgery 1/1/2020 12/29/2019    SBO (small bowel obstruction) (H) 06/03/2020    Sepsis (WBC 20K, ) 03/16/2020    Sleep apnea     No longer uses cpap.       Past Surgical History   Past Surgical History:   Procedure Laterality Date    ABDOMEN SURGERY      APPLY WOUND VAC  12/30/2019    Procedure: Apply Wound Vac;  Surgeon: Ayan Lopez MD;  Location: [facility] OR    CLOSE SECONDARY WOUND ABDOMEN N/A 1/1/2020    Procedure: SECONDARY ABDOMINAL WOUND CLOSURE;  Surgeon: Ayan Lopez MD;  Location: [facility] OR    CYSTOSCOPY N/A 9/21/2018    Procedure: CYSTOSCOPY;  Cystoscopy and Botox Injection Into the Bladder and suprapubic tube exchange;  Surgeon: Yoandy Rios MD;  [facility]    CYSTOSCOPY N/A 3/15/2019    Procedure: Cystoscopy, suprapubic tube exchange;  Surgeon: Yoandy Rios MD;  [facility]    CYSTOSCOPY FLEXIBLE, CYSTOSTOMY, INSERT TUBE SUPRAPUBIC, COMBINED N/A 11/1/2019    Procedure: Cystoscopy, Bladder Botox Injection, Suprapubic Tube Change;  Surgeon: Yoandy Rios MD;  [facility]    CYSTOSCOPY, INJECT BOTOX N/A 11/18/2022    Procedure: CYSTOSCOPY, WITH BOTULINUM TOXIN INJECTION;  Surgeon: Yoandy Rios MD;  [facility]    CYSTOSCOPY, INTRAVESICAL INJECTION N/A 8/23/2017    Procedure: CYSTOSCOPY, INTRAVESICAL INJECTION;  Cystoscopy, Botox Injection Into The Bladder  Latex Allergy ;  Surgeon: Yoandy Rios MD;  [facility]    CYSTOSCOPY, INTRAVESICAL INJECTION N/A 3/8/2018    Procedure: CYSTOSCOPY, INTRAVESICAL INJECTION;  Cystoscopy, Botox Injection  Into The Bladder and suprapubic catheter exchange;  Surgeon: Yoandy Rios MD;  [facility]    DISCECTOMY, FUSION CERVICAL ANTERIOR THREE+ LEVELS, COMBINED Left 5/3/2016    Procedure: COMBINED DISCECTOMY, FUSION CERVICAL ANTERIOR THREE+ LEVELS;  Surgeon: Jasvir Torres MD;  [facility]: [facility]    ENTEROSCOPY SMALL BOWEL N/A 6/8/2020    Procedure: Enteroscopy small bowel;  Surgeon: Gabe Forte MD;  [facility]: [facility]    GASTROSTOMY, INSERT TUBE, COMBINED N/A 1/1/2020    Procedure: GASTRIC-JEJUNAL FEEDING TUBE INSERTION;  Surgeon: Ayan Lopez MD;  Location:  OR    GENITOURINARY SURGERY      suprapubic catheter    HERNIA REPAIR  1957    double hernia    HYSTERECTOMY, PAP NO LONGER INDICATED      CELIA and large ovarian tumor removed    INJECT BOTOX N/A 9/21/2018    Procedure: INJECT BOTOX;;  Surgeon: Yoandy Rios MD;  [facility]    INJECT BOTOX N/A 3/15/2019    Procedure: Inject Botox into Bladder;  Surgeon: Yoandy Rios MD;  [facility]    IR GASTRO JEJUNOSTOMY TUBE PLACEMENT  6/11/2020    IR GASTROSTOMY TUBE CHANGE  6/30/2020    IR JEJUNOSTOMY TUBE CHANGE  2/4/2020    IR JEJUNOSTOMY TUBE CHANGE  2/19/2020    LAPAROSCOPY DIAGNOSTIC (GENERAL) N/A 12/30/2019    Procedure: Laparoscopy diagnostic (general);  Surgeon: Ayan Lopez MD;  Location:  OR    LAPAROTOMY EXPLORATORY N/A 1/1/2020    Procedure: EXPLORATORY LAPAROTOMY;  Surgeon: Ayan Lopez MD;  Location:  OR    LAPAROTOMY EXPLORATORY N/A 12/30/2019    Procedure: LAPAROTOMY, REPAIR OF ULCER PERFORATION;  Surgeon: Ayan Lopez MD;  Location: [facility]    SURGICAL HISTORY OF -       left cataract surgery    SURGICAL HISTORY OF -   12/14    right cataract surgery and left laser revision    SURGICAL HISTORY OF -   March 2015    left carpal tunnel release    TONSILLECTOMY  1974    ZC LAMINECTOMY,FACETECTOMY,LUMBAR  1982    Z TOTAL KNEE ARTHROPLASTY  1999    left       Prior to  Admission Medications   Prior to Admission Medications   Prescriptions Last Dose Informant Patient Reported? Taking?   DULoxetine (CYMBALTA) 60 MG capsule  Self Yes Yes   Sig: Take 120 mg by mouth every morning   albuterol (PROVENTIL) (2.5 MG/3ML) 0.083% neb solution  at prn  No Yes   Sig: USE 1 VIAL VIA NEBULIZATION EVERY 4 HOURS AS NEEDED   aspirin 81 MG EC tablet   Yes Yes   Sig: Take 81 mg by mouth daily   blood glucose (NO BRAND SPECIFIED) test strip   No No   Sig: Use to test blood sugar one times daily or as directed. To accompany: Blood Glucose Monitor Brands: per insurance.   blood glucose monitoring (NO BRAND SPECIFIED) meter device kit   No No   Sig: Use to test blood sugar one times daily or as directed. Preferred blood glucose meter OR supplies to accompany: Blood Glucose Monitor Brands: per insurance.   fluticasone-vilanterol (BREO ELLIPTA) 100-25 MCG/ACT inhaler   No Yes   Sig: INHALE 1 PUFF INTO THE LUNGS DAILY   furosemide (LASIX) 40 MG tablet   Yes Yes   Sig: Take 40 mg by mouth daily   gabapentin (NEURONTIN) 800 MG tablet  Self Yes Yes   Sig: Take 1,600 mg by mouth 3 times daily 0800, 1200 and 1800   levothyroxine (SYNTHROID/LEVOTHROID) 88 MCG tablet   No Yes   Sig: TAKE 1 TABLET(88 MCG) BY MOUTH DAILY   lisinopril (ZESTRIL) 2.5 MG tablet   No Yes   Sig: Take 1 tablet (2.5 mg) by mouth daily   methadone (DOLOPHINE) 5 MG tablet 10/28/2023  Yes Yes   Sig: Take 5 mg by mouth 3 times daily   naloxone (NARCAN) 4 MG/0.1ML nasal spray  at prn  Yes Yes   Sig: CALL 911. SPR CONTENTS OF ONE SPRAYER (0.1ML) INTO ONE NOSTRIL. REPEAT IN 2-3 MIN IF SYMPTOMS OF OPIOID EMERGENCY PERSIST, ALTERNATE NOSTRILS   oxyBUTYnin ER (DITROPAN XL) 15 MG 24 hr tablet   No Yes   Sig: Take 1 tablet (15 mg) by mouth daily   rosuvastatin (CRESTOR) 20 MG tablet   No Yes   Sig: Take 1 tablet (20 mg) by mouth daily   semaglutide (OZEMPIC) 2 MG/3ML pen 10/22/2023  No Yes   Sig: Inject 0.5 mg Subcutaneous every 7 days   senna-docusate  (SENOKOT-S/PERICOLACE) 8.6-50 MG tablet  at prn  No Yes   Sig: Take 2 tablets by mouth 2 times daily as needed for constipation   spironolactone (ALDACTONE) 100 MG tablet   Yes Yes   Sig: Take 100 mg by mouth daily   thin (NO BRAND SPECIFIED) lancets   No No   Sig: Use with lanceting device. To accompany: Blood Glucose Monitor Brands: per insurance.   traZODone (DESYREL) 150 MG tablet   Yes Yes   Sig: Take 150 mg by mouth At Bedtime      Facility-Administered Medications Last Administration Doses Remaining   Botulinum Toxin Type A (BOTOX) 200 units injection 200 Units None recorded 1             Physical Exam   Vital Signs: Temp: 97.9  F (36.6  C)   BP: 134/80 Pulse: 79   Resp: 20 SpO2: 96 % O2 Device: None (Room air)    Weight: 220 lbs 0 oz   General: Awake, alert, disheveled 68 yo female sitting upright in bed, No acute distress.  HEENT: Normocephalic, atraumatic. Extraocular movements intact. Pupils equal round and reactive to light bilaterally. Oropharynx clear without exudates.   Respiratory: Clear to auscultation bilaterally, no rales, wheezing, or rhonchi.  Cardiovascular: Regular rate and rhythm, +S1 and S2, no murmur auscultated. No peripheral edema.   Gastrointestinal: Soft, obese, nonspecific tenderness throughout lower abdomen, dark urine to suprapubic catheter, urine leaking around cath site, non-distended.   Skin: Warm, dry. No obvious rashes or lesions on exposed skin. Dorsalis pedis pulses palpable bilaterally.  Musculoskeletal: No joint swelling, erythema or tenderness. Moves all extremities equally.  Neurologic: AAO x3. Clear speech, no facial droop normal strength and sensation to upper extremities, chronic weakness to BLE, sensation intact, painful 2/2 neuropathy.  Psychiatric: Appropriate mood and affect. No obvious anxiety or depression.      Medical Decision Making   45 MINUTES SPENT BY ME on the date of service doing chart review, history, exam, documentation & further activities per the  note.      Data   ------------------------- PAST 24 HR DATA REVIEWED -----------------------------------------------    I have personally reviewed the following data over the past 24 hrs:    13.0 (H)  \   16.2 (H)   / 296     135 95 (L) 25.2 (H) /  164 (H)   4.3 24 0.94 \     ALT: 42 AST: 32 AP: 105 (H) TBILI: 0.6   ALB: 4.5 TOT PROTEIN: 8.3 LIPASE: N/A     Procal: N/A CRP: N/A Lactic Acid: 1.7         Imaging results reviewed over the past 24 hrs:   Recent Results (from the past 24 hour(s))   CT Abdomen Pelvis w Contrast    Narrative    EXAM: CT ABDOMEN AND PELVIS WITH CONTRAST  LOCATION: [facility]  DATE: 10/29/2023    INDICATION: Abdominal pain. Possible pyelonephritis.  COMPARISON: 10/23/2023.  TECHNIQUE: CT scan of the abdomen and pelvis was performed following injection of IV contrast. Multiplanar reformats were obtained. Dose reduction techniques were used.  CONTRAST: 111 mL Isovue 370.    FINDINGS:   LOWER CHEST: Elevated right hemidiaphragm. No effusions.    HEPATOBILIARY: Normal contour with no significant mass. No bile duct dilatation. Calcified gallstones. Small amount of perihepatic fluid.    PANCREAS: No significant mass, duct dilatation, or inflammatory change.    SPLEEN: Normal size.    ADRENAL GLANDS: No significant nodules.    KIDNEYS/BLADDER: 4 mm stone at the ureteropelvic junction with minimal if any proximal hydronephrosis. Benign cyst requiring no follow-up again evident.    BOWEL: No obstruction or inflammatory change. Periumbilical hernia containing nonobstructed small bowel.    LYMPH NODES: No lymphadenopathy.    VASCULATURE: There are mild atherosclerotic changes of the visualized aorta and its branches. There is no evidence of aortic dissection or aneurysm.    PELVIC ORGANS: No pelvic masses.    MUSCULOSKELETAL: No frankly destructive bony lesions.      Impression    IMPRESSION:   1.  4 mm stone at ureteropelvic junction on the left with minimal if any proximal  hydronephrosis.  2.  No pyelonephritis demonstrated by CT although mild pyelonephritis can be occult.  3.  Otherwise, stable exam since comparison.

## 2023-10-29 NOTE — ED NOTES
Bed: ED24  Expected date:   Expected time:   Means of arrival:   Comments:  433  UTI sxs   Here now

## 2023-10-29 NOTE — ED PROVIDER NOTES
History     Chief Complaint:  UTI       HPI   Bhavani White is a 67 year old female who presents with 3 days of back pain, urinary incontinence despite having a Lundberg in place, swelling herself, and generalized weakness.  She states that she also is having nausea and vomiting, and also has chronic back pain that is worse over this time.  She is fully dependent because she has a neurogenic bladder, and tells me that she was recently diagnosed with a urine infection, and given 3 days of Cipro, the last of that she took yesterday.    States that she cannot function at home anymore, and appears to be in significant pain.      Independent Historian:   No    Review of External Notes: Yes, I have reviewed the patient's last visit with her family practice provider on 3 August of 2023 where she was seen for type 2 diabetes and morbid obesity.  It also seems as though she has a history of alcoholic cirrhosis.      Allergies:  Povidone Iodine  Toradol [Ketorolac]  Tramadol     Medications:    albuterol (PROVENTIL) (2.5 MG/3ML) 0.083% neb solution  aspirin 81 MG EC tablet  BETA BLOCKER NOT PRESCRIBED (INTENTIONAL)  bisacodyl (DULCOLAX) 10 MG suppository  blood glucose (NO BRAND SPECIFIED) test strip  blood glucose monitoring (NO BRAND SPECIFIED) meter device kit  ciprofloxacin (CIPRO) 500 MG tablet  diazepam (VALIUM) 5 MG tablet  DULoxetine (CYMBALTA) 60 MG capsule  fluticasone-vilanterol (BREO ELLIPTA) 100-25 MCG/ACT inhaler  gabapentin (NEURONTIN) 800 MG tablet  hydrOXYzine (ATARAX) 50 MG tablet  hypromellose (ARTIFICIAL TEARS) 0.5 % SOLN ophthalmic solution  ipratropium - albuterol 0.5 mg/2.5 mg/3 mL (DUONEB) 0.5-2.5 (3) MG/3ML neb solution  levothyroxine (SYNTHROID/LEVOTHROID) 88 MCG tablet  lisinopril (ZESTRIL) 2.5 MG tablet  metFORMIN (GLUCOPHAGE XR) 500 MG 24 hr tablet  methadone (DOLOPHINE) 10 MG tablet  multivitamin, therapeutic (THERA-VIT) TABS tablet  naloxone (NARCAN) 4 MG/0.1ML nasal spray  oxyBUTYnin ER  (DITROPAN XL) 15 MG 24 hr tablet  polyethylene glycol (MIRALAX/GLYCOLAX) packet  rosuvastatin (CRESTOR) 20 MG tablet  semaglutide (OZEMPIC) 2 MG/3ML pen  semaglutide (OZEMPIC) 2 MG/3ML pen  Semaglutide, 1 MG/DOSE, (OZEMPIC) 4 MG/3ML pen  senna-docusate (SENOKOT-S/PERICOLACE) 8.6-50 MG tablet  thin (NO BRAND SPECIFIED) lancets  traZODone (DESYREL) 150 MG tablet  VENTOLIN  (90 Base) MCG/ACT inhaler        Past Medical History:    Past Medical History:   Diagnosis Date    Abdominal wall cellulitis 03/16/2020    Abdominopelvic abscess (H) 02/01/2020    Anxiety     Asthma     Depression     DM (diabetes mellitus) (H) 2003    Fracture of lower extremity 06/26/2013    Frequent UTI     History of blood transfusion     History of ulcer disease 11/22/2014    Hypertension     Hypothyroid     Iatrogenic Cushing's disease (H)     Infection due to alpha-hemolytic Streptococcus 02/01/2020    Morbid obesity (H)     MRSA (methicillin resistant staph aureus) culture positive 05/24/2012    ANIYA -- noncompliant with CPAP     Osteoarthritis     Other chronic pain     Perforated duodenal ulcer -- S/P Surgery 1/1/2020 12/29/2019    SBO (small bowel obstruction) (H) 06/03/2020    Sepsis (WBC 20K, ) 03/16/2020    Sleep apnea        Past Surgical History:    Past Surgical History:   Procedure Laterality Date    ABDOMEN SURGERY      APPLY WOUND VAC  12/30/2019    Procedure: Apply Wound Vac;  Surgeon: Ayan Lopez MD;  [facility]    CLOSE SECONDARY WOUND ABDOMEN N/A 1/1/2020    Procedure: SECONDARY ABDOMINAL WOUND CLOSURE;  Surgeon: Ayan Lopez MD;  [facility]: [facility]    CYSTOSCOPY N/A 9/21/2018    Procedure: CYSTOSCOPY;  Cystoscopy and Botox Injection Into the Bladder and suprapubic tube exchange;  Surgeon: Yoandy Rios MD;  [facility]    CYSTOSCOPY N/A 3/15/2019    Procedure: Cystoscopy, suprapubic tube exchange;  Surgeon: Yoandy Rios MD;  [facility]    CYSTOSCOPY FLEXIBLE,  CYSTOSTOMY, INSERT TUBE SUPRAPUBIC, COMBINED N/A 11/1/2019    Procedure: Cystoscopy, Bladder Botox Injection, Suprapubic Tube Change;  Surgeon: Yoandy Rios MD;  [facility]    CYSTOSCOPY, INJECT BOTOX N/A 11/18/2022    Procedure: CYSTOSCOPY, WITH BOTULINUM TOXIN INJECTION;  Surgeon: Yoandy Rios MD;  [facility]    CYSTOSCOPY, INTRAVESICAL INJECTION N/A 8/23/2017    Procedure: CYSTOSCOPY, INTRAVESICAL INJECTION;  Cystoscopy, Botox Injection Into The Bladder  Latex Allergy ;  Surgeon: Yoandy Rios MD;  [facility]    CYSTOSCOPY, INTRAVESICAL INJECTION N/A 3/8/2018    Procedure: CYSTOSCOPY, INTRAVESICAL INJECTION;  Cystoscopy, Botox Injection Into The Bladder and suprapubic catheter exchange;  Surgeon: Yoandy Rios MD;  [facility]    DISCECTOMY, FUSION CERVICAL ANTERIOR THREE+ LEVELS, COMBINED Left 5/3/2016    Procedure: COMBINED DISCECTOMY, FUSION CERVICAL ANTERIOR THREE+ LEVELS;  Surgeon: Jasvir Torres MD;  [facility]    ENTEROSCOPY SMALL BOWEL N/A 6/8/2020    Procedure: Enteroscopy small bowel;  Surgeon: Gabe Forte MD;  Location:  GI    GASTROSTOMY, INSERT TUBE, COMBINED N/A 1/1/2020    Procedure: GASTRIC-JEJUNAL FEEDING TUBE INSERTION;  Surgeon: Ayan Lopez MD;  Location:  OR    GENITOURINARY SURGERY      suprapubic catheter    HERNIA REPAIR  1957    double hernia    HYSTERECTOMY, PAP NO LONGER INDICATED      CELIA and large ovarian tumor removed    INJECT BOTOX N/A 9/21/2018    Procedure: INJECT BOTOX;;  Surgeon: Yoandy Rios MD;  [facility]    INJECT BOTOX N/A 3/15/2019    Procedure: Inject Botox into Bladder;  Surgeon: Yoandy Rios MD;  [facility]    IR GASTRO JEJUNOSTOMY TUBE PLACEMENT  6/11/2020    IR GASTROSTOMY TUBE CHANGE  6/30/2020    IR JEJUNOSTOMY TUBE CHANGE  2/4/2020    IR JEJUNOSTOMY TUBE CHANGE  2/19/2020    LAPAROSCOPY DIAGNOSTIC (GENERAL) N/A 12/30/2019    Procedure:  "Laparoscopy diagnostic (general);  Surgeon: Ayan Lopez MD;  [facility]    LAPAROTOMY EXPLORATORY N/A 1/1/2020    Procedure: EXPLORATORY LAPAROTOMY;  Surgeon: Ayan Lopez MD;  [facility]    LAPAROTOMY EXPLORATORY N/A 12/30/2019    Procedure: LAPAROTOMY, REPAIR OF ULCER PERFORATION;  Surgeon: Ayan Lopez MD;  [facility]    SURGICAL HISTORY OF -       left cataract surgery    SURGICAL HISTORY OF -   12/14    right cataract surgery and left laser revision    SURGICAL HISTORY OF -   March 2015    left carpal tunnel release    TONSILLECTOMY  1974    ZC LAMINECTOMY,FACETECTOMY,LUMBAR  1982    Z TOTAL KNEE ARTHROPLASTY  1999    left        Family History:    family history includes Cancer in her father; Connective Tissue Disorder in her father; Depression in her mother, sister, and son; Heart Disease in her mother; Hypertension in her father and mother; Other Cancer (age of onset: 89) in her father.    Social History:   reports that she has never smoked. She has never used smokeless tobacco. She reports current drug use. Drug: Marijuana. She reports that she does not drink alcohol.  PCP: Hailee Henry     Physical Exam   Patient Vitals for the past 24 hrs:   BP Temp Pulse Resp SpO2 Height Weight   10/29/23 1237 -- 97.9  F (36.6  C) -- 20 -- 1.651 m (5' 5\") 99.8 kg (220 lb)   10/29/23 1216 -- -- -- -- 97 % -- --   10/29/23 1215 (!) 187/92 -- 91 -- -- -- --        Physical Exam  Vitals: reviewed by me  General: Pt seen on [facility], [facility], sitting upright, leaning forward, appears to be uncomfortable.  Eyes: Tracking well, clear conjunctiva BL  ENT: MMM, midline trachea.   Lungs: No tachypnea, no accessory muscle use. No respiratory distress.   CV: Rate as above  Abd: Soft, nonspecific tenderness noted throughout the lower abdomen.  Urine is quite dark.  MSK: no joint effusion.  No evidence of trauma  Skin: No rash  Neuro: Clear speech and no facial droop.  Psych: Not " RIS, no e/o /          Emergency Department Course         Imaging:  CT Abdomen Pelvis w Contrast   Final Result   IMPRESSION:    1.  4 mm stone at ureteropelvic junction on the left with minimal if any proximal hydronephrosis.   2.  No pyelonephritis demonstrated by CT although mild pyelonephritis can be occult.   3.  Otherwise, stable exam since comparison.            Report per radiology    Laboratory:  Labs Ordered and Resulted from Time of ED Arrival to Time of ED Departure   ISTAT GASES LACTATE VENOUS POCT - Abnormal       Result Value    Lactic Acid POCT 2.5 (*)     Bicarbonate Venous POCT 27      O2 Sat, Venous POCT 56 (*)     pCO2 Venous POCT 36 (*)     pH Venous POCT 7.48 (*)     pO2 Venous POCT 27     CBC WITH PLATELETS AND DIFFERENTIAL - Abnormal    WBC Count 13.0 (*)     RBC Count 6.22 (*)     Hemoglobin 16.2 (*)     Hematocrit 49.9 (*)     MCV 80      MCH 26.0 (*)     MCHC 32.5      RDW 15.6 (*)     Platelet Count 296      % Neutrophils 89      % Lymphocytes 7      % Monocytes 3      % Eosinophils 0      % Basophils 1      % Immature Granulocytes 0      NRBCs per 100 WBC 0      Absolute Neutrophils 11.6 (*)     Absolute Lymphocytes 0.8      Absolute Monocytes 0.4      Absolute Eosinophils 0.0      Absolute Basophils 0.1      Absolute Immature Granulocytes 0.1      Absolute NRBCs 0.0     COMPREHENSIVE METABOLIC PANEL   ROUTINE UA WITH MICROSCOPIC REFLEX TO CULTURE        Emergency Department Course & Assessments:      Interventions:  Medications   sodium chloride 0.9% BOLUS 1,000 mL (has no administration in time range)   HYDROmorphone (PF) (DILAUDID) injection 0.5 mg (has no administration in time range)   ondansetron (ZOFRAN) injection 4 mg (has no administration in time range)            Consultations/Discussion of Management or Tests:  Yes, I spoke to Dr. Bunch of urology team who is aware of the patient, and the kidney stone.  He will review the case and call back and recommendations.            Social Determinants of Health affecting care:   Stress/Adjustment Disorders      Disposition:  The patient was admitted to the hospital under the care of Dr. Dubon.     Impression & Plan        Medical Decision Making:  This is a very pleasant 67 old female presents emergency room with significant back pain, nausea vomiting, all in the setting of being treated for UTI.  She does have what appears to be suprapubic catheter going into what she describes as a neobladder, and while this does appear to be dark, we have not had any sample come back yet, although she is notably being treated for UTI.  I have broadened coverage to give ceftriaxone here, and thankfully the patient is afebrile.  Given the infectious possibilities here as well as the kidney stone even though there is no hydro-, I spoke with our urology colleagues, who will evaluate the patient and decide if whether or not a stent is indicated at this time.  Thankfully she is afebrile at this time, we are controlling her pain as best we can, and she will be admitted to the hospitalist team who is very generously agreed to accept care of the patient.    Diagnosis:    ICD-10-CM    1. Urinary tract infection associated with catheterization of urinary tract, unspecified indwelling urinary catheter type, initial encounter   T83.511A     N39.0       2. Sepsis, due to unspecified organism, unspecified whether acute organ dysfunction present (H)  A41.9                   10/29/2023   Walt Kirk*        Walt Kirk MD  10/29/23 1530

## 2023-10-30 NOTE — PROGRESS NOTES
Admission    Patient arrives to room 612 via cart from ED.  Care plan note: Done    Inpatient nursing criteria listed below were met:    Did you put disposition on whiteboard and in sticky note: Yes  Full skin assessment done (add LDA if skin issue present).RG :Yes  Isolation education started/completed Yes  Patient allergies verified with patient: Yes  Fall Risk? (Care plan updated, Education given and documented) Yes  Primary Care Plan initiated: Yes  Home medications documented in belongings flowsheet: Yes  Patient belongings documented in belongings flowsheet: Yes  Reminder note (belongings/ medications) placed in discharge instructions:Yes  Admission profile/ required documentation complete: Yes  If patient is a 72 hour hold/Commitment are belongings removed from room and locked up? NA

## 2023-10-30 NOTE — PROGRESS NOTES
Shift: 0940-0121 10/29/2023    Summary: Admitted for Possible UTI r/t Neurogenic Bladder w/chronic indwelling Lundberg Catheter. 4 mm stone at ureteropelvic junction on the left.    Orientation: AO x4  Pain: Patient rated her pain at 6/10, she received tylenol and dilaudid in ED just prior to coming up to the floor.    Vitals/Tele: VSS RA  IV Access/drains: L PIV SL; Suprapubic catheter  Diet: NPO at midnight   Mobility: A2 w/Lift; Uses Wheelchair at home.   GI/: Chronic Suprapubic Catheter; Continent of Bowel, calls appropriately; No BM this shift.   Wound/Skin: Scattered Abrasions, Bruising; dry and cracked skin on heels; redness in pubic region; Suprapubic catheter; previous surgical scars.   Consults: Urology  Discharge Plan: TBD      See Flow sheets for assessment

## 2023-10-30 NOTE — PLAN OF CARE
Goal Outcome Evaluation:         Summary: UTI     DATE & TIME: 10/29/2023-10/30/2023 [id number]     Cognitive Concerns/ Orientation: A&Ox4   BEHAVIOR & AGGRESSION TOOL COLOR: Green  ABNL VS/O2: VSS on RA   MOBILITY: Assist x2- not oob this shift. Wheelchair bound at baseline d/t spinal stenosis   PAIN MANAGMENT: C/o back pain, bladder spasms and back spasms- PRN Dilaudid given x2, and heat packs with relief    DIET: Mod Carb, NPO @ midnight for stent placement in AM  BOWEL/BLADDER: Continent of Bowel. Suprapubic cath in place. No BM overnight   ABNL LAB/BG: B, 89  DRAIN/DEVICES: Suprapubic cath in place for neurogenic baldder, L PIV- SL   TELEMETRY RHYTHM: n/a   SKIN: Scattered scabs on Right and Left shin  TESTS/PROCEDURES: Left Ureteral stent placement in AM  D/C DAY/GOALS/PLACE: Pending     OTHER IMPORTANT INFO: PRN albuterol given x1

## 2023-10-30 NOTE — ANESTHESIA PREPROCEDURE EVALUATION
Anesthesia Pre-Procedure Evaluation    Patient: Bhavani White   MRN: [id number] : 1955        Procedure : Procedure(s):  Combined cystoscopy, left retrogrades, left stent placement          Past Medical History:   Diagnosis Date    Abdominal wall cellulitis 2020    Abdominopelvic abscess (H) 2020    Anxiety     Asthma     Depression     DM (diabetes mellitus) (H)     with peripheral neuropathy    Fracture of lower extremity 2013    Frequent UTI     History of blood transfusion     History of ulcer disease 2014    She notes from NSAIDs; nearly . Discussed a hospitalization at [facility] for this.    Hypertension     Hypothyroid     Iatrogenic Cushing's disease     off prednisone now    Infection due to alpha-hemolytic Streptococcus 2020    Morbid obesity (H)     MRSA (methicillin resistant staph aureus) culture positive 2012    repeat cx 10/12/2012 no staph mentioned.    ANIYA -- noncompliant with CPAP     Osteoarthritis     multiple joings.    Other chronic pain     Perforated duodenal ulcer -- S/P Surgery 2019    SBO (small bowel obstruction) (H) 2020    Sepsis (WBC 20K, ) 2020    Sleep apnea     No longer uses cpap.      Past Surgical History:   Procedure Laterality Date    ABDOMEN SURGERY      APPLY WOUND VAC  2019    Procedure: Apply Wound Vac;  Surgeon: Ayan Lopez MD;  Location:  OR    CLOSE SECONDARY WOUND ABDOMEN N/A 2020    Procedure: SECONDARY ABDOMINAL WOUND CLOSURE;  Surgeon: Ayan Lopez MD;  Location:  OR    CYSTOSCOPY N/A 2018    Procedure: CYSTOSCOPY;  Cystoscopy and Botox Injection Into the Bladder and suprapubic tube exchange;  Surgeon: Yoandy Rios MD;  [facility]    CYSTOSCOPY N/A 3/15/2019    Procedure: Cystoscopy, suprapubic tube exchange;  Surgeon: Yoandy Rios MD;  [facility]    CYSTOSCOPY FLEXIBLE, CYSTOSTOMY, INSERT TUBE SUPRAPUBIC, COMBINED  N/A 11/1/2019    Procedure: Cystoscopy, Bladder Botox Injection, Suprapubic Tube Change;  Surgeon: Yoandy Rios MD;  [facility]    CYSTOSCOPY, INJECT BOTOX N/A 11/18/2022    Procedure: CYSTOSCOPY, WITH BOTULINUM TOXIN INJECTION;  Surgeon: Yoandy Rios MD;  [facility]    CYSTOSCOPY, INTRAVESICAL INJECTION N/A 8/23/2017    Procedure: CYSTOSCOPY, INTRAVESICAL INJECTION;  Cystoscopy, Botox Injection Into The Bladder  Latex Allergy ;  Surgeon: Yoandy Rios MD;  [facility]    CYSTOSCOPY, INTRAVESICAL INJECTION N/A 3/8/2018    Procedure: CYSTOSCOPY, INTRAVESICAL INJECTION;  Cystoscopy, Botox Injection Into The Bladder and suprapubic catheter exchange;  Surgeon: Yoandy Rios MD;  [facility]    DISCECTOMY, FUSION CERVICAL ANTERIOR THREE+ LEVELS, COMBINED Left 5/3/2016    Procedure: COMBINED DISCECTOMY, FUSION CERVICAL ANTERIOR THREE+ LEVELS;  Surgeon: Jasvir Torres MD;  [facility]    ENTEROSCOPY SMALL BOWEL N/A 6/8/2020    Procedure: Enteroscopy small bowel;  Surgeon: Gabe Forte MD;  Location:  GI    GASTROSTOMY, INSERT TUBE, COMBINED N/A 1/1/2020    Procedure: GASTRIC-JEJUNAL FEEDING TUBE INSERTION;  Surgeon: Ayan Lopez MD;  Location:  OR    GENITOURINARY SURGERY      suprapubic catheter    HERNIA REPAIR  1957    double hernia    HYSTERECTOMY, PAP NO LONGER INDICATED      CELIA and large ovarian tumor removed    INJECT BOTOX N/A 9/21/2018    Procedure: INJECT BOTOX;;  Surgeon: Yoandy Rios MD;  [facility]    INJECT BOTOX N/A 3/15/2019    Procedure: Inject Botox into Bladder;  Surgeon: Yoandy Rios MD;  [facility]    IR GASTRO JEJUNOSTOMY TUBE PLACEMENT  6/11/2020    IR GASTROSTOMY TUBE CHANGE  6/30/2020    IR JEJUNOSTOMY TUBE CHANGE  2/4/2020    IR JEJUNOSTOMY TUBE CHANGE  2/19/2020    LAPAROSCOPY DIAGNOSTIC (GENERAL) N/A 12/30/2019    Procedure: Laparoscopy diagnostic (general);  Surgeon:  "Ayan Lopez MD;  [facility]    LAPAROTOMY EXPLORATORY N/A 1/1/2020    Procedure: EXPLORATORY LAPAROTOMY;  Surgeon: Ayan Lopez MD;  [facility]    LAPAROTOMY EXPLORATORY N/A 12/30/2019    Procedure: LAPAROTOMY, REPAIR OF ULCER PERFORATION;  Surgeon: Ayan Lopez MD;  [facility]    SURGICAL HISTORY OF -       left cataract surgery    SURGICAL HISTORY OF -   12/14    right cataract surgery and left laser revision    SURGICAL HISTORY OF -   March 2015    left carpal tunnel release    TONSILLECTOMY  1974    ZC LAMINECTOMY,FACETECTOMY,LUMBAR  1982    ZZC TOTAL KNEE ARTHROPLASTY  1999    left      Allergies   Allergen Reactions    Povidone Iodine      \"mild skin irritation\" per patient report    Toradol [Ketorolac] Other (See Comments)     Pt gets nightmares    Tramadol Other (See Comments)      Social History     Tobacco Use    Smoking status: Never    Smokeless tobacco: Never   Substance Use Topics    Alcohol use: No      Wt Readings from Last 1 Encounters:   10/29/23 99.8 kg (220 lb)        Anesthesia Evaluation   Pt has had prior anesthetic.     No history of anesthetic complications       ROS/MED HX  ENT/Pulmonary:     (+) sleep apnea,                    asthma                  Neurologic:       Cardiovascular:     (+)  hypertension- -   -  - -      CHF                                METS/Exercise Tolerance:     Hematologic:       Musculoskeletal:       GI/Hepatic:    (-) GERD   Renal/Genitourinary:     (+) renal disease,             Endo:     (+) type I DM,         thyroid problem,     Obesity,       Psychiatric/Substance Use:     (+)  1       Infectious Disease:       Malignancy:       Other:      (+)  , H/O Chronic Pain,         Physical Exam    Airway        Mallampati: II    Neck ROM: full     Respiratory Devices and Support         Dental       (+) Modest Abnormalities - crowns, retainers, 1 or 2 missing teeth      Cardiovascular   cardiovascular exam normal      "     Pulmonary   pulmonary exam normal                OUTSIDE LABS:  CBC:   Lab Results   Component Value Date    WBC 13.0 (H) 10/29/2023    WBC 9.2 10/23/2023    HGB 16.2 (H) 10/29/2023    HGB 14.2 10/23/2023    HCT 49.9 (H) 10/29/2023    HCT 43.8 10/23/2023     10/29/2023     10/23/2023     BMP:   Lab Results   Component Value Date     10/29/2023     (L) 10/23/2023    POTASSIUM 4.3 10/29/2023    POTASSIUM 4.5 10/23/2023    CHLORIDE 95 (L) 10/29/2023    CHLORIDE 94 (L) 10/23/2023    CO2 24 10/29/2023    CO2 29 10/23/2023    BUN 25.2 (H) 10/29/2023    BUN 30.5 (H) 10/23/2023    CR 0.94 10/29/2023    CR 1.04 (H) 10/23/2023    CR 1.2 (H) 10/23/2023    GLC 89 10/30/2023    GLC 86 10/30/2023     COAGS:   Lab Results   Component Value Date    PTT 26 10/23/2023    INR 0.99 10/23/2023    FIBR 576 (H) 01/19/2009     POC:   Lab Results   Component Value Date     (H) 06/16/2020     HEPATIC:   Lab Results   Component Value Date    ALBUMIN 4.5 10/29/2023    PROTTOTAL 8.3 10/29/2023    ALT 42 10/29/2023    AST 32 10/29/2023    ALKPHOS 105 (H) 10/29/2023    BILITOTAL 0.6 10/29/2023     OTHER:   Lab Results   Component Value Date    PH 7.35 01/18/2020    LACT 1.7 10/29/2023    A1C 6.2 (H) 08/08/2023    DORY 11.6 (H) 10/29/2023    PHOS 3.3 06/15/2020    MAG 1.9 06/15/2020    LIPASE 34 (L) 06/03/2020    AMYLASE 44 01/08/2009    TSH 0.40 08/08/2023    T4 1.03 08/15/2018    CRP 30.0 (H) 05/16/2016    SED 11 03/04/2010       Anesthesia Plan    ASA Status:  3    NPO Status:  NPO Appropriate    Anesthesia Type: General.     - Airway: LMA   Induction: Intravenous.   Maintenance: Balanced.        Consents    Anesthesia Plan(s) and associated risks, benefits, and realistic alternatives discussed. Questions answered and patient/representative(s) expressed understanding.     - Discussed:     - Discussed with:  Patient            Postoperative Care    Pain management: IV analgesics.   PONV prophylaxis:  Ondansetron (or other 5HT-3)     Comments:                Taran Chandler MD

## 2023-10-30 NOTE — PROVIDER NOTIFICATION
MD Notification    Notified Person: MD    Notified Person Name: Isabel Selby     Notification Date/Time: 10/30/2023 @0107    Notification Interaction: [employer] messaging     Purpose of Notification: Pt. is asking for inhaler specifically Breo. Pt. is due to get this in the AM and says that she hasn't used it in days. Any suggestions?     Orders Received: PRN albuterol ordered    Comments:

## 2023-10-30 NOTE — TELEPHONE ENCOUNTER
Reason for visit: Cystoscopy with botox       Relevant information: Neurogenic bladder    Records/imaging/labs/orders: Prior authorization for botox complete,  23    Pt called: no need for a call    At Rooming: empty bladder - botox 200 units - verify mix    Ivet Snow CMA  10/30/2023  12:54 PM

## 2023-10-30 NOTE — CONSULTS
Urology Consult    Name: Bhavani White    MRN: [id number]   YOB: 1955               Chief Complaint:   Potential UTI    History is obtained from the patient and chart review          History of Present Illness:   Bhavani White is a 67F with neurogenic bladder secondary to spinal stenosis, chronic SPT, routine botox injections, recurrent UTIs, generalized chronic pain all over body, especially on right side, presenting with general feeling of malaise and RIGHT back pain. Reportedly recently treated for UTI 3 days ago with cipro.    At bedside she feels slightly fevers/chills, otherwise okay, doesn't have really any left flank pain, urination going well, they exchanged her SP tube without issue in the ED.          Past Medical History:     Past Medical History:   Diagnosis Date    Abdominal wall cellulitis 2020    Abdominopelvic abscess (H) 2020    Anxiety     Asthma     Depression     DM (diabetes mellitus) (H)     with peripheral neuropathy    Fracture of lower extremity 2013    Frequent UTI     History of blood transfusion     History of ulcer disease 2014    She notes from NSAIDs; nearly . Discussed a hospitalization at [facility] for this.    Hypertension     Hypothyroid     Iatrogenic Cushing's disease (H)     off prednisone now    Infection due to alpha-hemolytic Streptococcus 2020    Morbid obesity (H)     MRSA (methicillin resistant staph aureus) culture positive 2012    repeat cx 10/12/2012 no staph mentioned.    ANIYA -- noncompliant with CPAP     Osteoarthritis     multiple joings.    Other chronic pain     Perforated duodenal ulcer -- S/P Surgery 2019    SBO (small bowel obstruction) (H) 2020    Sepsis (WBC 20K, ) 2020    Sleep apnea     No longer uses cpap.            Past Surgical History:     Past Surgical History:   Procedure Laterality Date    ABDOMEN SURGERY      APPLY WOUND VAC  2019    Procedure: Apply  Wound Vac;  Surgeon: Ayan Lopez MD;  Location:  OR    CLOSE SECONDARY WOUND ABDOMEN N/A 1/1/2020    Procedure: SECONDARY ABDOMINAL WOUND CLOSURE;  Surgeon: Ayan Lopez MD;  Location: [facility] OR    CYSTOSCOPY N/A 9/21/2018    Procedure: CYSTOSCOPY;  Cystoscopy and Botox Injection Into the Bladder and suprapubic tube exchange;  Surgeon: Yoandy Rios MD;  [facility]    CYSTOSCOPY N/A 3/15/2019    Procedure: Cystoscopy, suprapubic tube exchange;  Surgeon: Yoandy Rios MD;  [facility]    CYSTOSCOPY FLEXIBLE, CYSTOSTOMY, INSERT TUBE SUPRAPUBIC, COMBINED N/A 11/1/2019    Procedure: Cystoscopy, Bladder Botox Injection, Suprapubic Tube Change;  Surgeon: Yoandy Rios MD;  [facility]    CYSTOSCOPY, INJECT BOTOX N/A 11/18/2022    Procedure: CYSTOSCOPY, WITH BOTULINUM TOXIN INJECTION;  Surgeon: Yoandy Rios MD;  [facility]    CYSTOSCOPY, INTRAVESICAL INJECTION N/A 8/23/2017    Procedure: CYSTOSCOPY, INTRAVESICAL INJECTION;  Cystoscopy, Botox Injection Into The Bladder  Latex Allergy ;  Surgeon: Yoandy Rios MD;  [facility]    CYSTOSCOPY, INTRAVESICAL INJECTION N/A 3/8/2018    Procedure: CYSTOSCOPY, INTRAVESICAL INJECTION;  Cystoscopy, Botox Injection Into The Bladder and suprapubic catheter exchange;  Surgeon: Yoandy Rios MD;  [facility]    DISCECTOMY, FUSION CERVICAL ANTERIOR THREE+ LEVELS, COMBINED Left 5/3/2016    Procedure: COMBINED DISCECTOMY, FUSION CERVICAL ANTERIOR THREE+ LEVELS;  Surgeon: Jasvir Torres MD;  [facility]    ENTEROSCOPY SMALL BOWEL N/A 6/8/2020    Procedure: Enteroscopy small bowel;  Surgeon: Gabe Forte MD;  Location:  GI    GASTROSTOMY, INSERT TUBE, COMBINED N/A 1/1/2020    Procedure: GASTRIC-JEJUNAL FEEDING TUBE INSERTION;  Surgeon: Ayan Lopez MD;  Location:  OR    GENITOURINARY SURGERY      suprapubic catheter    HERNIA REPAIR  1957    double hernia     "HYSTERECTOMY, PAP NO LONGER INDICATED      CELIA and large ovarian tumor removed    INJECT BOTOX N/A 9/21/2018    Procedure: INJECT BOTOX;;  Surgeon: Yoandy Rios MD;  [facility]    INJECT BOTOX N/A 3/15/2019    Procedure: Inject Botox into Bladder;  Surgeon: Yoandy Rios MD;  [facility]    IR GASTRO JEJUNOSTOMY TUBE PLACEMENT  6/11/2020    IR GASTROSTOMY TUBE CHANGE  6/30/2020    IR JEJUNOSTOMY TUBE CHANGE  2/4/2020    IR JEJUNOSTOMY TUBE CHANGE  2/19/2020    LAPAROSCOPY DIAGNOSTIC (GENERAL) N/A 12/30/2019    Procedure: Laparoscopy diagnostic (general);  Surgeon: Ayan Lopez MD;  [facility]    LAPAROTOMY EXPLORATORY N/A 1/1/2020    Procedure: EXPLORATORY LAPAROTOMY;  Surgeon: Ayan Lopez MD;  Location[facility]    LAPAROTOMY EXPLORATORY N/A 12/30/2019    Procedure: LAPAROTOMY, REPAIR OF ULCER PERFORATION;  Surgeon: Ayan Lopez MD;  Location: [facility]    SURGICAL HISTORY OF -       left cataract surgery    SURGICAL HISTORY OF -   12/14    right cataract surgery and left laser revision    SURGICAL HISTORY OF -   March 2015    left carpal tunnel release    TONSILLECTOMY  1974    ZC LAMINECTOMY,FACETECTOMY,LUMBAR  1982    Z TOTAL KNEE ARTHROPLASTY  1999    left            Social History:     Social History     Tobacco Use    Smoking status: Never    Smokeless tobacco: Never   Substance Use Topics    Alcohol use: No            Family History:     Family History   Problem Relation Age of Onset    Hypertension Mother     Heart Disease Mother         bypass    Depression Mother     Hypertension Father     Connective Tissue Disorder Father         ankylosing spondylitis    Cancer Father         colon; prostate    Other Cancer Father 89        bladder cancer    Depression Sister     Depression Son             Allergies:     Allergies   Allergen Reactions    Povidone Iodine      \"mild skin irritation\" per patient report    Toradol [Ketorolac] Other (See Comments)     " Pt gets nightmares    Tramadol Other (See Comments)            Medications:     Current Facility-Administered Medications   Medication    acetaminophen (TYLENOL) tablet 975 mg    albuterol (PROVENTIL) neb solution 2.5 mg    [START ON 10/30/2023] cefTRIAXone (ROCEPHIN) 2 g vial to attach to  ml bag for ADULTS or NS 50 ml bag for PEDS    glucose gel 15-30 g    Or    dextrose 50 % injection 25-50 mL    Or    glucagon injection 1 mg    [START ON 10/30/2023] DULoxetine (CYMBALTA) DR capsule 120 mg    [START ON 10/30/2023] fluticasone-vilanterol (BREO ELLIPTA) 100-25 MCG/ACT inhaler 1 puff    gabapentin (NEURONTIN) tablet 1,600 mg    HYDROmorphone (DILAUDID) injection 0.2 mg    insulin aspart (NovoLOG) injection (RAPID ACTING)    insulin aspart (NovoLOG) injection (RAPID ACTING)    [START ON 10/30/2023] levothyroxine (SYNTHROID/LEVOTHROID) tablet 88 mcg    lidocaine (LMX4) cream    lidocaine 1 % 0.1-1 mL    melatonin tablet 1 mg    methadone (DOLOPHINE) tablet 5 mg    naloxone (NARCAN) injection 0.2 mg    Or    naloxone (NARCAN) injection 0.4 mg    Or    naloxone (NARCAN) injection 0.2 mg    Or    naloxone (NARCAN) injection 0.4 mg    ondansetron (ZOFRAN ODT) ODT tab 4 mg    Or    ondansetron (ZOFRAN) injection 4 mg    oxyBUTYnin ER (DITROPAN XL) 24 hr tablet 15 mg    [START ON 10/30/2023] rosuvastatin (CRESTOR) tablet 20 mg    senna-docusate (SENOKOT-S/PERICOLACE) 8.6-50 MG per tablet 1 tablet    Or    senna-docusate (SENOKOT-S/PERICOLACE) 8.6-50 MG per tablet 2 tablet    sodium chloride (PF) 0.9% PF flush 3 mL    sodium chloride (PF) 0.9% PF flush 3 mL    traZODone (DESYREL) tablet 150 mg             Review of Systems:    ROS: 10 point ROS neg other than the symptoms noted above in the HPI           Physical Exam:   VS:  T: 97.5    HR: 62    BP: 153/91    RR: 18   GEN:  AOx3.  NAD.    CV:  Non cyanotic  LUNGS: Non-labored breathing.   ABD:  Soft.  NT.  ND.    :  SP tube draining clear yellow urine           Data:   All laboratory data reviewed:    Recent Labs   Lab 10/29/23  1238 10/23/23  1758   WBC 13.0* 9.2   HGB 16.2* 14.2    282       Recent Labs   Lab 10/29/23  2209 10/29/23  1819 10/29/23  1238 10/23/23  1758   NA  --   --  135 133*   POTASSIUM  --   --  4.3 4.5   CHLORIDE  --   --  95* 94*   CO2  --   --  24 29   BUN  --   --  25.2* 30.5*   CR  --   --  0.94 1.04*  1.2*   GLC 97 109* 164* 116*   DORY  --   --  11.6* 10.3*       Recent Labs   Lab 10/29/23  1825   COLOR Yellow   APPEARANCE Clear   URINEGLC Negative   URINEBILI Negative   URINEKETONE Trace*   SG 1.010   URINEPH 5.5   PROTEIN 10*   NITRITE Negative   LEUKEST Moderate*   RBCU 15*   WBCU 16*       All pertinent imaging reviewed:    CT scan of the abdomen 10/29/23:   IMPRESSION:   1.  4 mm stone at ureteropelvic junction on the left with minimal if any proximal hydronephrosis.  2.  No pyelonephritis demonstrated by CT although mild pyelonephritis can be occult.  3.  Otherwise, stable exam since comparison.            Impression and Plan:   Impression:   Bhavani White is a 67F with neurogenic bladder secondary to spinal stenosis, chronic SPT, routine botox injections, recurrent UTIs, generalized chronic pain all over body, especially on right side, presenting with general feeling of malaise and RIGHT back pain.     She had her SP tube exchanged in ED.    CT scan showed opposite side stone, LEFT side 4mm with minimal hydronephrosis. She is afebrile, WBC 13, and non toxic appearing. UA from newly exchanged tube showed 16 WBC, 15 RBC, negative nitrite, moderate leukesterase, which all would be expected from a patient with a chronic SP tube.    Discussed possibility of stone in setting of UTI (which is not a definitive diagnosis given vague symptomatology, which she agrees with). She is afebrile and non toxic currently, with only mild leukocytosis, and not definitively positive UA.     After discussion risks, benefits, alternatives with her, given  the vagueness of symptoms and mild leukocytosis, she is opting for stent placement in the AM with definitive stone treatment at later date. Notably, she has appt 11/6 with Dr. Rios's office for either repeat office botox or discussion of setting this up.      Plan:     - NPO at midnight, plan for left ureteral stent placement in AM.    - Definitive stone surgery to be coordinated with understanding of Dr. Rios's plan for botox in near future.    - Urology will continue to follow. Please contact resident/PA on call with any questions or concerns.     This patient's exam findings, labs, and imaging discussed with urology staff surgeon Dr. Ramírez, who developed the treatment plan.    Julio C Gamboa MD  Urology Resident

## 2023-10-30 NOTE — OP NOTE
[facility]  Operative Note    Pre-operative diagnosis: Ureteral stone [N20.1]   Post-operative diagnosis Ureteral stone [N20.1]   Procedure: Procedure(s):  Cystoscopy, left retrograde pyelogram, left ureteral stent placement  Fluoroscopic interpretation <1 hour physician time   Surgeon: Steven Shen MD   Assistants(s): none   Anesthesia: General    Estimated blood loss: None    Total IV fluids: (See anesthesia record)   Blood transfusion: No transfusion was given during surgery   Total urine output: Not measured   Drains: Suprapubic tube (preexisting, not exchanged)  Left 6 Fr x 26 cm Polaris Ultra ureteral stent   Specimens: none   Implants: * No implants in log *       Findings:   No left hydronephrosis  Stent placed with return of clear effluent.   Complications: None.   Condition: Stable               Description of procedure:  68 yo F with neurogenic bladder managed with suprapubic tube, h/o recurrent UTI, now presenting with systemic symptoms including malaise and subjective fever, RIGHT sided back pain, found to have small LEFT proximal ureteral stone. Discussed with patient that her symptoms are discordant with the imaging findings, but that in setting of systemic symptoms and concern for urinary tract infection, recommend cystoscopy with left ureteral stent placement followed by interval ureteroscopic management of the stone after treatment of infection. Risks of stent including stent pain and irritation, hematuria, encrustation discussed. Informed consent obtained    The patient was brought to OR#19. Ancef abx were given prior to the procedure. General anesthesia was induced, she was placed in dorsal lithotomy position, prepped and draped in standard sterile fashion. Timeout was performed     A 22 Fr Storz cystoscope was assembled and passed through the urethra into the bladder. The suprapubic tube was noted entering the dome of the bladder. There were no bladder stones, no  bladder tumors or raised erythema. The left ureteral orifice was identified and cannulated with a 5Fr tigertail catheter. A gentle retrograde pyelogram showed no significant hydronephrosis. A wire was then passed to the renal pelvis under fluoroscopic guidance and the tigertail was removed. A stent was placed in the usual fashion under cystoscopic and fluoroscopic guidance with good coils noted proximally and distally. Patient was cleaned up, awoken from anesthesia and brought to PACU in stable condition    Steven Shen MD   [facility]  [phone] clinic phone

## 2023-10-30 NOTE — PROGRESS NOTES
RECEIVING UNIT ED HANDOFF REVIEW    ED Nurse Handoff Report was reviewed by: Lyric Sneed RN on October 29, 2023 at 10:11 PM

## 2023-10-30 NOTE — CONSULTS
Care Management Initial Consult    General Information  Assessment completed with: Patient,    Type of CM/SW Visit: Initial Assessment    Primary Care Provider verified and updated as needed: Yes   Readmission within the last 30 days: no previous admission in last 30 days      Reason for Consult: discharge planning  Advance Care Planning:            Communication Assessment  Patient's communication style: spoken language (English or Bilingual)    Hearing Difficulty or Deaf: no   Wear Glasses or Blind: yes    Cognitive  Cognitive/Neuro/Behavioral: WDL  Level of Consciousness: alert  Arousal Level: opens eyes spontaneously  Orientation: oriented x 4  Mood/Behavior: anxious, cooperative     Speech: clear, spontaneous, logical    Living Environment:   People in home: spouse     Current living Arrangements: apartment      Able to return to prior arrangements: yes       Family/Social Support:  Care provided by: self, spouse/significant other, other (see comments) (home RN)  Provides care for: spouse  Marital Status:     Sergio       Description of Support System: Supportive    Support Assessment: Adequate family and caregiver support    Current Resources:   Patient receiving home care services: Yes  Skilled Home Care Services: Skilled Nursing  Community Resources: Home Care  Equipment currently used at home: colostomy/ostomy supplies, grab bar, toilet, grab bar, tub/shower, shower chair, wheelchair, power  Supplies currently used at home: Diabetic Supplies, Incontinence Supplies    Employment/Financial:  Employment Status: disabled        Financial Concerns:             Does the patient's insurance plan have a 3 day qualifying hospital stay waiver?  No    Lifestyle & Psychosocial Needs:  Social Determinants of Health     Food Insecurity: No Food Insecurity (2/7/2022)    Hunger Vital Sign     Worried About Running Out of Food in the Last Year: Never true     Ran Out of Food in the Last Year: Never true    Depression: Not at risk (8/3/2023)    PHQ-2     PHQ-2 Score: 0   Housing Stability: Low Risk  (2/7/2022)    Housing Stability Vital Sign     Unable to Pay for Housing in the Last Year: No     Number of Places Lived in the Last Year: 1     Unstable Housing in the Last Year: No   Tobacco Use: Low Risk  (10/30/2023)    Patient History     Smoking Tobacco Use: Never     Smokeless Tobacco Use: Never     Passive Exposure: Not on file   Financial Resource Strain: Low Risk  (2/7/2022)    Overall Financial Resource Strain (CARDIA)     Difficulty of Paying Living Expenses: Not very hard   Alcohol Use: Not At Risk (2/7/2022)    AUDIT-C     Frequency of Alcohol Consumption: Never     Average Number of Drinks: Not on file     Frequency of Binge Drinking: Never   Transportation Needs: No Transportation Needs (2/7/2022)    PRAPARE - Transportation     Lack of Transportation (Medical): No     Lack of Transportation (Non-Medical): No   Physical Activity: Inactive (2/7/2022)    Exercise Vital Sign     Days of Exercise per Week: 0 days     Minutes of Exercise per Session: 0 min   Interpersonal Safety: Not At Risk (4/20/2020)    Humiliation, Afraid, Rape, and Kick questionnaire     Fear of Current or Ex-Partner: No     Emotionally Abused: No     Physically Abused: No     Sexually Abused: No   Stress: No Stress Concern Present (2/7/2022)    [employer] [place] - Occupational Stress Questionnaire     Feeling of Stress : Not at all   Social Connections: Unknown (2/7/2022)    Social Connection and Isolation Panel [NHANES]     Frequency of Communication with Friends and Family: More than three times a week     Frequency of Social Gatherings with Friends and Family: Patient refused     Attends [employer] Services: Patient refused     Active Member of Clubs or Organizations: No     Attends Club or Organization Meetings: Not on file     Marital Status:        Functional Status:  Prior to admission patient needed  assistance:   Dependent ADLs:: Wheelchair-independent  Dependent IADLs:: Cleaning  Assesssment of Functional Status: Not at baseline with mobility, Not at  functional baseline    Mental Health Status:  Mental Health Status: Past Concern  Mental Health Management: Medication, Psychiatrist    Chemical Dependency Status:  Chemical Dependency Status: Past Concern  Chemical Dependency Management: Methadone     Reason for Methadone: Pain control    Values/Beliefs:  Spiritual, Cultural Beliefs, Holiness Practices, Values that affect care: no               Additional Information:  Met with patient to discuss plan of care and discharge plans.   Patient with h/o multiple medical problems adm with UTI and Ureteral Stone  now s/p cysto with stent.  Patient stating she lives with spouse who had a stroke,patient is w/c bound due to spinal stenosis. Patient has chronic pain and follows a pain clinic .Patient has a S/P  Cath for neurogenic bladder . Patient states she has cleaning services once/week. Home RN  weekly pending patients progress..    Patient states sh has her husbands brother and sister-in -law who will be able to assist if needed..  Patient has lost her son at age 26 due to overdose and has a daughter that lives out of town.  Patient was on Methadone for pain and is self weaning off as she felt it did not help much.  Plan for discharge when pain controlled       Mert Sewell, THANG [phone]

## 2023-10-30 NOTE — ANESTHESIA POSTPROCEDURE EVALUATION
Patient: Bhavani White    Procedure: Procedure(s):  Combined cystoscopy, left retrogrades, left stent placement       Anesthesia Type:  General    Note:  Disposition: Admission   Postop Pain Control: Uneventful            Sign Out: Well controlled pain   PONV: No   Neuro/Psych: Uneventful            Sign Out: Acceptable/Baseline neuro status   Airway/Respiratory: Uneventful            Sign Out: Acceptable/Baseline resp. status   CV/Hemodynamics: Uneventful            Sign Out: Acceptable CV status; No obvious hypovolemia; No obvious fluid overload   Other NRE: NONE   DID A NON-ROUTINE EVENT OCCUR?            Last vitals:  Vitals Value Taken Time   /75 10/30/23 0931   Temp 36.3  C (97.4  F) 10/30/23 0931   Pulse 75 10/30/23 0929   Resp 13 10/30/23 0928   SpO2 93 % 10/30/23 0929   Vitals shown include unfiled device data.    Electronically Signed By: Taran Chandler MD  October 30, 2023  11:27 AM

## 2023-10-30 NOTE — ANESTHESIA CARE TRANSFER NOTE
Patient: Bhavani White    Procedure: Procedure(s):  Combined cystoscopy, left retrogrades, left stent placement       Diagnosis: Ureteral stone [N20.1]  Diagnosis Additional Information: No value filed.    Anesthesia Type:   General     Note:    Oropharynx: oropharynx clear of all foreign objects  Level of Consciousness: awake  Oxygen Supplementation: face mask  Level of Supplemental Oxygen (L/min / FiO2): 6  Independent Airway: airway patency satisfactory and stable  Dentition: dentition unchanged  Vital Signs Stable: post-procedure vital signs reviewed and stable  Report to RN Given: handoff report given  Patient transferred to: PACU  Comments: Patient complains of discomfort, see MAR for medications administered  Handoff Report: Identifed the Patient, Identified the Reponsible Provider, Reviewed the pertinent medical history, Discussed the surgical course, Reviewed Intra-OP anesthesia mangement and issues during anesthesia, Set expectations for post-procedure period and Allowed opportunity for questions and acknowledgement of understanding      Vitals:  Vitals Value Taken Time   BP     Temp     Pulse 66 10/30/23 0824   Resp 12 10/30/23 0824   SpO2 100 % 10/30/23 0824   Vitals shown include unfiled device data.    Electronically Signed By: ELISSA Mcnally CRNA  October 30, 2023  8:25 AM

## 2023-10-30 NOTE — UTILIZATION REVIEW
"  Admission Status; Secondary Review Determination         Under the authority of the Utilization Management Committee, the utilization review process indicated a secondary review on the above patient.  The review outcome is based on review of the medical records, discussions with staff, and applying clinical experience noted on the date of the review.        (X)      Inpatient Status Appropriate - This patient's medical care is consistent with medical management for inpatient care and reasonable inpatient medical practice.      () Observation Status Appropriate - This patient does not meet hospital inpatient criteria and is placed in observation status. If this patient's primary payer is Medicare and was admitted as an inpatient, Condition Code 44 should be used and patient status changed to \"observation\".   () Admission Status NOT Appropriate - This patient's medical care is not consistent with medical management for Inpatient or Observation Status.          RATIONALE FOR DETERMINATION     67 year-old female with past medical history spinal stenosis, asthma, DMII, hypertension, hypothyroid, morbid obesity, sleep apnea non-compliant with CPAP, hepatic cirrhosis, chronic pain syndrome, anxiety/depression and neurogenic bladder with chronic indwelling catheter with frequent UTIs who presented to [facility] ED with concerns of urinary tract infection.  Her white count was elevated.  There was a 4 mm stone identified at the left UP junction.  Patient was initiated on IV antibiotics.  She has required multiple doses of parenteral pain medications.  Urology was consulted and patient underwent stent placement.    The severity of illness, intensity of service provided, expected LOS and risk for adverse outcome make the care complex, high risk and appropriate for hospital admission.        The information on this document is developed by the utilization review team in order for the business office to ensure compliance.  This " only denotes the appropriateness of proper admission status and does not reflect the quality of care rendered.         The definitions of Inpatient Status and Observation Status used in making the determination above are those provided in the CMS Coverage Manual, Chapter 1 and Chapter 6, section 70.4.      Sincerely,     Ismael Samuel MD  Physician Advisor  Utilization Review/ Case Management  [facility].

## 2023-10-30 NOTE — PROGRESS NOTES
[facility]    Medicine Progress Note - Hospitalist Service    Date of Admission:  10/29/2023    Assessment & Plan   Bhavani White is a wheelchair bound 67 year-old female with past medical history spinal stenosis, asthma, DMII, hypertension, hypothyroid, morbid obesity, sleep apnea non-compliant with CPAP, hepatic cirrhosis, chronic pain syndrome, anxiety/depression and neurogenic bladder with chronic indwelling catheter with frequent UTIs who presented to [facility] ED earlier today with concerns of urinary tract infection. She will be admitted to [facility] for IV antibiotics.     Sepsis with lactic acidosis, leukocytosis likely secondary to complicated UTI  History of recurrent UTIs  Neurogenic bladder with chronic indwelling nguyen catheter  4 mm left ureteropelvic junction stone  * Patient with history of spinal stenosis, wheelchair bound with neurogenic bladder s/p suprapubic catheter placed in 2009.  * Patient afebrile.  * Patient notes leaking around suprapubic catheter for sometime now  -Started on ceftriaxone on admission, continue  Follow urine and blood culture-  - CT CAP with 4 mm stone at ureteropelvic junction on the left with minimal if any proximal hydronephrosis.  No pyelonephritis demonstrated by CT although mild pyelonephritis can be occult.  -Appreciate urology input.  Underwent cystoscopy with left retrograde pyelogram and left ureteral stent placement 10/30  -Continue to monitor    Hypercalcemia  CKD versus ROLANDO, mild  -Hypercalcemia appears chronic at least dating back to January 2023, creatinine at baseline ranges anywhere from 0.9-1.1  -Calcium today is 10.7 was 11.6 on arrival yesterday  -Check ionized calcium, PTH, PTH RP, vitamin D level  -Creatinine slightly worsened today  -Continue hydration     Chronic stable diagnoses and other pertinent medical history:  Appropriate PTA medications will be resumed  Hypertension - holding PTA Lisinopril  for now - reassess tomorrow  Hyperlipidemia - continue PTA Crestor 20 mg daily  Asthma - continue PTA inhalers PRN  Depression/Anxiety - continue PTA Duloxetine 120 q am  Hypothyroid - continue PTA Synthroid 88 mcg daily  Morbid obesity - awareness, comorbidity   Sleep apnea non-compliant with CPAP - awareness  Hepatic cirrhosis - awareness     Chronic pain syndrome/Fibromyalgia with opioid dependence  * Follows with [facility] in [place]  - Continue PTA Methadone  - Continue PTA Gabapentin 1600 mg TID (consider weaning outpatient with PCP)  - Continue PTA Trazodone 150 mg at bedtime     Chronic Diastolic Heart Failure  - Daily weights.  - Holding PTA Spironolactone 100 mg and PTA Lasix secondary to hypercalcemia/slight worsening of creatinine and patient getting IV fluids  -Monitor for fluid overload    Non-Insulin dependent type 2 diabetes  PTA Regimen: Semaglutide 0.5 q7days  Last HbA1c 5.8 on 11/15/2022  - Hypoglycemia protocol  - Sliding scale insulin Medium  - Consistent CHO diet - 60gm per meaL     H/O perforated duodenal ulcer 12/29/2019   * Subsequent prolonged hospitalizations for intra-abdominal infections requiring exploratory laparotomy, TRUONG, drain placement with perforation, and wound vac, readmission 3/13/20-  3/16/20 for sepsis and intra-abdominal infections and abdominal wall cellulitis which were treated with antibiotics.  - Awareness.          Diet: Advance Diet as Tolerated: Clear Liquid Diet    DVT Prophylaxis: Pneumatic Compression Devices  Lundberg Catheter: Not present  Lines: None     Cardiac Monitoring: None  Code Status: Full Code      Clinically Significant Risk Factors Present on Admission           # Hypercalcemia: Highest Ca = 11.6 mg/dL in last 2 days, will monitor as appropriate      # Drug Induced Platelet Defect: home medication list includes an antiplatelet medication   # Hypertension: Noted on problem list      # Obesity: Estimated body mass index is  "36.61 kg/m  as calculated from the following:    Height as of this encounter: 1.651 m (5' 5\").    Weight as of this encounter: 99.8 kg (220 lb).       # Asthma: noted on problem list        Disposition Plan     Expected Discharge Date: 10/31/2023                    Eufemia Shanks MD  Hospitalist Service  [facility]  Securely message with [employer] (more info)  Text page via [employer] Paging/Directory   ______________________________________________________________________    Interval History   Care assumed, chart reviewed.  Patient was just back from cystoscopy and stent placement earlier today.  Complained of severe spasm.  No other nursing concerns.  Physical Exam   Vital Signs: Temp: 97.4  F (36.3  C) Temp src: Oral BP: 130/75 Pulse: 64   Resp: 13 SpO2: 94 % O2 Device: None (Room air) Oxygen Delivery: 6 LPM  Weight: 220 lbs 0 oz    Exam:  Constitutional: Awake, alert and no distress. Appears comfortable  Head: Normocephalic. No masses, lesions, tenderness or abnormalities  ENMT: ENT exam normal, no neck nodes or sinus tenderness  Cardiovascular: RRR.  No murmurs, no rubs or JVD  Respiratory: Normal WOB,b/l equal air entry, no wheezes or crackles   Gastrointestinal: Abdomen soft, suprapubic catheter BS normal. No masses, organomegaly  : Deferred  Extremities : No edema , no clubbing or cyanosis      Medical Decision Making       55 MINUTES SPENT BY ME on the date of service doing chart review, history, exam, documentation & further activities per the note.      Data     I have personally reviewed the following data over the past 24 hrs:    9.4  \   13.7   / 233     134 (L) 96 (L) 22.7 /  97   4.2 29 1.05 (H) \       Imaging results reviewed over the past 24 hrs:   Recent Results (from the past 24 hour(s))   XR Surgery ROZ Fluoro Less Than 5 Min w Stills    Narrative    This exam was marked as non-reportable because it will not be read by a   radiologist or a [facility] non-radiologist " provider.           Recent Labs   Lab 10/30/23  0827 10/30/23  0726 10/30/23  0623 10/29/23  1819 10/29/23  1238 10/23/23  1758   WBC  --  9.4  --   --  13.0* 9.2   HGB  --  13.7  --   --  16.2* 14.2   MCV  --  82  --   --  80 80   PLT  --  233  --   --  296 282   INR  --   --   --   --   --  0.99   NA  --  134*  --   --  135 133*   POTASSIUM  --  4.2  --   --  4.3 4.5   CHLORIDE  --  96*  --   --  95* 94*   CO2  --  29  --   --  24 29   BUN  --  22.7  --   --  25.2* 30.5*   CR  --  1.05*  --   --  0.94 1.04*  1.2*   ANIONGAP  --  9  --   --  16* 10   DORY  --  10.7*  --   --  11.6* 10.3*   GLC 97 92 89   < > 164* 116*   ALBUMIN  --   --   --   --  4.5 4.1   PROTTOTAL  --   --   --   --  8.3 7.3   BILITOTAL  --   --   --   --  0.6 0.4   ALKPHOS  --   --   --   --  105* 96   ALT  --   --   --   --  42 36   AST  --   --   --   --  32 32    < > = values in this interval not displayed.

## 2023-10-31 NOTE — CONSULTS
[facility]    Infectious Disease Consultation     Date of Admission:  10/29/2023  Date of Consult (When I saw the patient): 10/31/23    Assessment & Plan   Bhavani White is a 67 year old who was admitted on 10/29/2023.     Impression:  68 yo wheelchair bound female with a chronic suprapubic catheter presenting with a 4 mm stone at the UPJ junction on the left s/p cystoscopy and left ureteral stent placement 10/30/23.    --Urine culture polymicrobial with 3 strains gram positive bacilli and 2 strains non-lactose fermenting GNR, this would be consistent with colonization.   --Afebrile. Only mildly elevated WBC on admission, which has now normalized.   -No pyelonephritis on CT.   --On Ceftriaxone.       Recommendations:   --Would recommend stopping all antibiotics. She has no signs/symptoms of urinary tract infection of fever. WBC is normal. UA is relatively unremarkable with only 16 WBC and urine culture with 5 different strains of bacteria, which would signify colonization in this 68 yo with a chronic suprapubic catheter.    --ID will sign off. Please call with any questions.     Patient and plan discussed with Dr. Domínguez.       Adilene Lopes PA-C      Reason for Consult   Reason for consult: Asked to evaluate this patient for antibiotic recommendations in setting of recently placed ureteral stent and urine culture polymicrobial.    Primary Care Physician   Hailee Henry    Chief Complaint   UTI    History is obtained from the patient and medical records    History of Present Illness   Bhavani White is a 67 year old female who is wheelchair bound due to spinal stenosis with a history of DM, morbid obesity, ANIYA, HTN, cirrhosis, chronic pain, and neurogenic bladder with chronic indwelling suprapubic catheter. She has had frequent UTI's and presented again on 10/29/23 with concern for a new UTI with malaise, subjective fever, and right flank pain. UA with 16 WBC and few bacteria. Urine culture is  growing 3 strains of gramp positive bacilli and 2 strains of non lactose fermenting GNR. She does note leaking around the catheter for some time. CT showed a 4 mm stone at the UPJ on the left with minimal proximal hydronephrosis. No pyelonephritis was clearly demonstrated. Urology evaluated her and took her for a cystoscopy with left ureteral stent placement 10/30/23. She has been on Ceftriaxone and did receive one dose of IV Ancef obed-operatively.  She has been afebrile and her WBC has normalized.     Past Medical History   I have reviewed this patient's medical history and updated it with pertinent information if needed.   Past Medical History:   Diagnosis Date    Abdominal wall cellulitis 2020    Abdominopelvic abscess (H) 2020    Anxiety     Asthma     Depression     DM (diabetes mellitus) (H)     with peripheral neuropathy    Fracture of lower extremity 2013    Frequent UTI     History of blood transfusion     History of ulcer disease 2014    She notes from NSAIDs; nearly . Discussed a hospitalization at [facility] for this.    Hypertension     Hypothyroid     Iatrogenic Cushing's disease     off prednisone now    Infection due to alpha-hemolytic Streptococcus 2020    Morbid obesity (H)     MRSA (methicillin resistant staph aureus) culture positive 2012    repeat cx 10/12/2012 no staph mentioned.    ANIYA -- noncompliant with CPAP     Osteoarthritis     multiple joings.    Other chronic pain     Perforated duodenal ulcer -- S/P Surgery 2019    SBO (small bowel obstruction) (H) 2020    Sepsis (WBC 20K, ) 2020    Sleep apnea     No longer uses cpap.       Past Surgical History   I have reviewed this patient's surgical history and updated it with pertinent information if needed.  Past Surgical History:   Procedure Laterality Date    ABDOMEN SURGERY      APPLY WOUND VAC  2019    Procedure: Apply Wound Vac;  Surgeon: Ayan Lopez,  MD;  Location: [facility] OR    CLOSE SECONDARY WOUND ABDOMEN N/A 1/1/2020    Procedure: SECONDARY ABDOMINAL WOUND CLOSURE;  Surgeon: Ayan Lopez MD;  Location: [facility]    COMBINED CYSTOSCOPY, RETROGRADES, EXCHANGE STENT URETER(S) Left 10/30/2023    Procedure: Combined cystoscopy, left retrogrades, left stent placement;  Surgeon: Steven Shen MD;  Location: [facility] OR    CYSTOSCOPY N/A 9/21/2018    Procedure: CYSTOSCOPY;  Cystoscopy and Botox Injection Into the Bladder and suprapubic tube exchange;  Surgeon: Yoandy Rios MD;  [facility]    CYSTOSCOPY N/A 3/15/2019    Procedure: Cystoscopy, suprapubic tube exchange;  Surgeon: Yoandy Rios MD;  [facility]    CYSTOSCOPY FLEXIBLE, CYSTOSTOMY, INSERT TUBE SUPRAPUBIC, COMBINED N/A 11/1/2019    Procedure: Cystoscopy, Bladder Botox Injection, Suprapubic Tube Change;  Surgeon: Yoandy Rios MD;  [facility]    CYSTOSCOPY, INJECT BOTOX N/A 11/18/2022    Procedure: CYSTOSCOPY, WITH BOTULINUM TOXIN INJECTION;  Surgeon: Yoandy Rios MD;  [facility]    CYSTOSCOPY, INTRAVESICAL INJECTION N/A 8/23/2017    Procedure: CYSTOSCOPY, INTRAVESICAL INJECTION;  Cystoscopy, Botox Injection Into The Bladder  Latex Allergy ;  Surgeon: Yoandy Rios MD;  [facility]    CYSTOSCOPY, INTRAVESICAL INJECTION N/A 3/8/2018    Procedure: CYSTOSCOPY, INTRAVESICAL INJECTION;  Cystoscopy, Botox Injection Into The Bladder and suprapubic catheter exchange;  Surgeon: Yoandy Rios MD;  [facility]    DISCECTOMY, FUSION CERVICAL ANTERIOR THREE+ LEVELS, COMBINED Left 5/3/2016    Procedure: COMBINED DISCECTOMY, FUSION CERVICAL ANTERIOR THREE+ LEVELS;  Surgeon: Jasvir Torres MD;  [facility]    ENTEROSCOPY SMALL BOWEL N/A 6/8/2020    Procedure: Enteroscopy small bowel;  Surgeon: Gabe Forte MD;  Location:  GI    GASTROSTOMY, INSERT TUBE, COMBINED N/A 1/1/2020    Procedure: GASTRIC-JEJUNAL FEEDING TUBE  INSERTION;  Surgeon: Ayan Lopez MD;  Location: [facility] OR    GENITOURINARY SURGERY      suprapubic catheter    HERNIA REPAIR  1957    double hernia    HYSTERECTOMY, PAP NO LONGER INDICATED      CELIA and large ovarian tumor removed    INJECT BOTOX N/A 9/21/2018    Procedure: INJECT BOTOX;;  Surgeon: Yoandy Rios MD;  [facility]    INJECT BOTOX N/A 3/15/2019    Procedure: Inject Botox into Bladder;  Surgeon: Yoandy Rios MD;  [facility]    IR GASTRO JEJUNOSTOMY TUBE PLACEMENT  6/11/2020    IR GASTROSTOMY TUBE CHANGE  6/30/2020    IR JEJUNOSTOMY TUBE CHANGE  2/4/2020    IR JEJUNOSTOMY TUBE CHANGE  2/19/2020    LAPAROSCOPY DIAGNOSTIC (GENERAL) N/A 12/30/2019    Procedure: Laparoscopy diagnostic (general);  Surgeon: Ayan Lopez MD;  [facility]    LAPAROTOMY EXPLORATORY N/A 1/1/2020    Procedure: EXPLORATORY LAPAROTOMY;  Surgeon: Ayan Lopez MD;  [facility]: [facility]    LAPAROTOMY EXPLORATORY N/A 12/30/2019    Procedure: LAPAROTOMY, REPAIR OF ULCER PERFORATION;  Surgeon: Ayan Lopez MD;  Location: [facility]    SURGICAL HISTORY OF -       left cataract surgery    SURGICAL HISTORY OF -   12/14    right cataract surgery and left laser revision    SURGICAL HISTORY OF -   March 2015    left carpal tunnel release    TONSILLECTOMY  1974    [facility] LAMINECTOMY,FACETECTOMY,LUMBAR  1982    [facility] TOTAL KNEE ARTHROPLASTY  1999    left       Prior to Admission Medications   Prior to Admission Medications   Prescriptions Last Dose Informant Patient Reported? Taking?   DULoxetine (CYMBALTA) 60 MG capsule  Self Yes Yes   Sig: Take 120 mg by mouth every morning   albuterol (PROVENTIL) (2.5 MG/3ML) 0.083% neb solution  at prn  No Yes   Sig: USE 1 VIAL VIA NEBULIZATION EVERY 4 HOURS AS NEEDED   aspirin 81 MG EC tablet   Yes Yes   Sig: Take 81 mg by mouth daily   blood glucose (NO BRAND SPECIFIED) test strip   No No   Sig: Use to test blood sugar one times daily or as directed. To  accompany: Blood Glucose Monitor Brands: per insurance.   blood glucose monitoring (NO BRAND SPECIFIED) meter device kit   No No   Sig: Use to test blood sugar one times daily or as directed. Preferred blood glucose meter OR supplies to accompany: Blood Glucose Monitor Brands: per insurance.   fluticasone-vilanterol (BREO ELLIPTA) 100-25 MCG/ACT inhaler   No Yes   Sig: INHALE 1 PUFF INTO THE LUNGS DAILY   furosemide (LASIX) 40 MG tablet   Yes Yes   Sig: Take 40 mg by mouth daily   gabapentin (NEURONTIN) 800 MG tablet  Self Yes Yes   Sig: Take 1,600 mg by mouth 3 times daily 0800, 1200 and 1800   levothyroxine (SYNTHROID/LEVOTHROID) 88 MCG tablet   No Yes   Sig: TAKE 1 TABLET(88 MCG) BY MOUTH DAILY   lisinopril (ZESTRIL) 2.5 MG tablet   No Yes   Sig: Take 1 tablet (2.5 mg) by mouth daily   methadone (DOLOPHINE) 5 MG tablet 10/28/2023  Yes Yes   Sig: Take 5 mg by mouth 3 times daily   naloxone (NARCAN) 4 MG/0.1ML nasal spray  at prn  Yes Yes   Sig: CALL 911. SPR CONTENTS OF ONE SPRAYER (0.1ML) INTO ONE NOSTRIL. REPEAT IN 2-3 MIN IF SYMPTOMS OF OPIOID EMERGENCY PERSIST, ALTERNATE NOSTRILS   oxyBUTYnin ER (DITROPAN XL) 15 MG 24 hr tablet   No Yes   Sig: Take 1 tablet (15 mg) by mouth daily   rosuvastatin (CRESTOR) 20 MG tablet   No Yes   Sig: Take 1 tablet (20 mg) by mouth daily   semaglutide (OZEMPIC) 2 MG/3ML pen 10/22/2023  No Yes   Sig: Inject 0.5 mg Subcutaneous every 7 days   senna-docusate (SENOKOT-S/PERICOLACE) 8.6-50 MG tablet  at prn  No Yes   Sig: Take 2 tablets by mouth 2 times daily as needed for constipation   spironolactone (ALDACTONE) 100 MG tablet   Yes Yes   Sig: Take 100 mg by mouth daily   thin (NO BRAND SPECIFIED) lancets   No No   Sig: Use with lanceting device. To accompany: Blood Glucose Monitor Brands: per insurance.   traZODone (DESYREL) 150 MG tablet   Yes Yes   Sig: Take 150 mg by mouth At Bedtime      Facility-Administered Medications Last Administration Doses Remaining   Botulinum Toxin  "Type A (BOTOX) 200 units injection 200 Units None recorded 1        Allergies   Allergies   Allergen Reactions    Povidone Iodine      \"mild skin irritation\" per patient report    Toradol [Ketorolac] Other (See Comments)     Pt gets nightmares    Tramadol Other (See Comments)       Immunization History   Immunization History   Administered Date(s) Administered    COVID-19 Bivalent 12+ (Pfizer) 10/06/2022    COVID-19 MONOVALENT 12+ (Pfizer) 12/08/2021    COVID-19 Vaccine (Chris) 04/10/2021    Influenza (IIV3) PF 11/05/2010, 10/07/2014    Influenza Vaccine 18-64 (Flublok) 09/20/2021    Influenza Vaccine 65+ (Fluzone HD) 10/06/2022    Influenza Vaccine >6 months (Alfuria,Fluzone) 10/26/2016, 10/27/2017    Influenza Vaccine, 6+MO IM (QUADRIVALENT W/PRESERVATIVES) 09/29/2015, 10/03/2019    Pneumo Conj 13-V (2010&after) 10/26/2016    Pneumococcal 23 valent 11/21/2001, 04/30/2012, 03/02/2022    TDAP (Adacel,Boostrix) 08/22/2013       Social History   I have reviewed this patient's social history and updated it with pertinent information if needed. Bhavani White  reports that she has never smoked. She has never used smokeless tobacco. She reports current drug use. Drug: Marijuana. She reports that she does not drink alcohol.    Family History   I have reviewed this patient's family history and updated it with pertinent information if needed.   Family History   Problem Relation Age of Onset    Hypertension Mother     Heart Disease Mother         bypass    Depression Mother     Hypertension Father     Connective Tissue Disorder Father         ankylosing spondylitis    Cancer Father         colon; prostate    Other Cancer Father 89        bladder cancer    Depression Sister     Depression Son        Review of Systems   The 10 point Review of Systems is negative    Physical Exam   Temp: 97.5  F (36.4  C) Temp src: Oral BP: 114/62 Pulse: 50   Resp: 18 SpO2: 97 % O2 Device: None (Room air)    Vital Signs with Ranges  Temp:  " [97.5  F (36.4  C)-98  F (36.7  C)] 97.5  F (36.4  C)  Pulse:  [] 50  Resp:  [18] 18  BP: (114-156)/(62-86) 114/62  SpO2:  [93 %-97 %] 97 %  220 lbs 0 oz  Body mass index is 36.61 kg/m .    GENERAL APPEARANCE:  awake  EYES: Eyes grossly normal to inspection  NECK: no adenopathy  RESP: lungs clear   CV: regular rates and rhythm  ABDOMEN: soft, nontender. Chronic suprapubic catheter with surrounding skin looking good without irritation or sign of infection.   MS: extremities normal  SKIN: no suspicious lesions or rashes        Data   All laboratory data reviewed    Component      Latest Ref Rn 10/31/2023  11:24 AM   WBC      4.0 - 11.0 10e3/uL 9.2    RBC Count      3.80 - 5.20 10e6/uL 4.91    Hemoglobin      11.7 - 15.7 g/dL 12.6    Hematocrit      35.0 - 47.0 % 40.9    MCV      78 - 100 fL 83    MCH      26.5 - 33.0 pg 25.7 (L)    MCHC      31.5 - 36.5 g/dL 30.8 (L)    RDW      10.0 - 15.0 % 15.7 (H)    Platelet Count      150 - 450 10e3/uL 208    % Neutrophils      % 73    % Lymphocytes      % 18    % Monocytes      % 8    % Eosinophils      % 1    % Basophils      % 0    % Immature Granulocytes      % 0    NRBCs per 100 WBC      <1 /100 0    Absolute Neutrophils      1.6 - 8.3 10e3/uL 6.6    Absolute Lymphocytes      0.8 - 5.3 10e3/uL 1.7    Absolute Monocytes      0.0 - 1.3 10e3/uL 0.8    Absolute Eosinophils      0.0 - 0.7 10e3/uL 0.1    Absolute Basophils      0.0 - 0.2 10e3/uL 0.0    Absolute Immature Granulocytes      <=0.4 10e3/uL 0.0    Absolute NRBCs      10e3/uL 0.0       Component      Latest Ref Rn 10/31/2023  11:24 AM   Sodium      135 - 145 mmol/L 139    Potassium      3.4 - 5.3 mmol/L 4.4    Chloride      98 - 107 mmol/L 104    Carbon Dioxide (CO2)      22 - 29 mmol/L 27    Anion Gap      7 - 15 mmol/L 8    Urea Nitrogen      8.0 - 23.0 mg/dL 16.5    Creatinine      0.51 - 0.95 mg/dL 0.86    GFR Estimate      >60 mL/min/1.73m2 74    Calcium      8.8 - 10.2 mg/dL 9.7    Glucose      70 - 99  mg/dL 110 (H)           10/29/2023 1825 10/31/2023 1013 Urine Culture [[id number]]   (Abnormal)   Urine, Catheter    Final result Component Value   Culture 10,000-50,000 CFU/mL Non lactose fermenting gram negative bacilli Abnormal     50,000-100,000 CFU/mL Non lactose fermenting gram negative bacilli Abnormal     50,000-100,000 CFU/mL Gram positive bacilli, resembling diphtheroids Abnormal     50,000-100,000 CFU/mL Gram positive bacilli Abnormal     <10,000 CFU/mL Gram positive bacilli, resembling diphtheroids Abnormal              10/29/2023 1437 10/30/2023 1803 Blood Culture Peripheral Blood [[id number]]   Peripheral Blood    Preliminary result Component Value   Culture No growth after 1 day P             10/29/2023 1437 10/30/2023 1803 Blood Culture Peripheral Blood [[id number]]   Peripheral Blood    Preliminary result Component Value   Culture No growth after 1 day P              EXAM: CT ABDOMEN AND PELVIS WITH CONTRAST  LOC[facility]: [facility]  DATE: 10/29/2023     INDICATION: Abdominal pain. Possible pyelonephritis.  COMPARISON: 10/23/2023.  TECHNIQUE: CT scan of the abdomen and pelvis was performed following injection of IV contrast. Multiplanar reformats were obtained. Dose reduction techniques were used.  CONTRAST: 111 mL Isovue 370.     FINDINGS:   LOWER CHEST: Elevated right hemidiaphragm. No effusions.     HEPATOBILIARY: Normal contour with no significant mass. No bile duct dilatation. Calcified gallstones. Small amount of perihepatic fluid.     PANCREAS: No significant mass, duct dilatation, or inflammatory change.     SPLEEN: Normal size.     ADRENAL GLANDS: No significant nodules.     KIDNEYS/BLADDER: 4 mm stone at the ureteropelvic junction with minimal if any proximal hydronephrosis. Benign cyst requiring no follow-up again evident.     BOWEL: No obstruction or inflammatory change. Periumbilical hernia containing nonobstructed small bowel.     LYMPH NODES: No  lymphadenopathy.     VASCULATURE: There are mild atherosclerotic changes of the visualized aorta and its branches. There is no evidence of aortic dissection or aneurysm.     PELVIC ORGANS: No pelvic masses.     MUSCULOSKELETAL: No frankly destructive bony lesions.                                                                      IMPRESSION:   1.  4 mm stone at ureteropelvic junction on the left with minimal if any proximal hydronephrosis.  2.  No pyelonephritis demonstrated by CT although mild pyelonephritis can be occult.  3.  Otherwise, stable exam since comparison.

## 2023-10-31 NOTE — PROGRESS NOTES
[facility]    Medicine Progress Note - Hospitalist Service    Date of Admission:  10/29/2023    Assessment & Plan   Bhavani White is a wheelchair bound 67 year-old female with past medical history spinal stenosis, asthma, DMII, hypertension, hypothyroid, morbid obesity, sleep apnea non-compliant with CPAP, hepatic cirrhosis, chronic pain syndrome, anxiety/depression and neurogenic bladder with chronic indwelling catheter with frequent UTIs who presented to [facility] with concerns of urinary tract infection.      Sepsis with lactic acidosis, leukocytosis likely secondary to complicated UTI  History of recurrent UTIs  Neurogenic bladder with chronic indwelling nguyen catheter  4 mm left ureteropelvic junction stone  * Patient with history of spinal stenosis, wheelchair bound with neurogenic bladder s/p suprapubic catheter placed in 2009.  Suprapubic catheter was changed in the ED  * Patient afebrile.  - CT CAP with 4 mm stone at ureteropelvic junction on the left with minimal if any proximal hydronephrosis.  No pyelonephritis demonstrated by CT although mild pyelonephritis can be occult.  -Appreciate urology input.  Underwent cystoscopy with left retrograde pyelogram and left ureteral stent placement 10/30  -Started on ceftriaxone on admission, continue  -Blood culture negative to date.  Urine culture with multiple organisms, patient has suprapubic catheter, however given recent ureteral stent placement will consult infectious disease for antibiotic recommendation.  -Patient continues to have abdominal spasm postoperative, not improving even with baclofen/opiates.  Chronically on opiates at home  -Pain management consultation    Hypercalcemia  CKD versus ROLANDO, mild  -Hypercalcemia appears chronic at least dating back to January 2023, creatinine at baseline ranges anywhere from 0.9-1.1  -Calcium was 11.6 on admission, ionized calcium within normal limit, improved to 9.7 today with  hydration  -PTH within normal limit.  PTH RP, vitamin D level pending  -Renal function and creatinine has improved.  We will stop IV fluids.     Chronic stable diagnoses and other pertinent medical history:  Appropriate PTA medications will be resumed  Hypertension - holding PTA Lisinopril for now - reassess tomorrow  Hyperlipidemia - continue PTA Crestor 20 mg daily  Asthma - continue PTA inhalers PRN  Depression/Anxiety - continue PTA Duloxetine 120 q am  Hypothyroid - continue PTA Synthroid 88 mcg daily  Morbid obesity - awareness, comorbidity   Sleep apnea non-compliant with CPAP - awareness  Hepatic cirrhosis - awareness     Chronic pain syndrome/Fibromyalgia with opioid dependence  * Follows with [facility] in [place]  - Continue PTA Methadone  - Continue PTA Gabapentin 1600 mg TID (consider weaning outpatient with PCP)  - Continue PTA Trazodone 150 mg at bedtime  -Pain management consulted as above     Chronic Diastolic Heart Failure  - Daily weights.  -Currently patient appears euvolemic.  Blood pressure normal to soft with occasional high readings, not sure if blood pressure aggravated by pain  -Currently PTA Spironolactone 100 mg and PTA Lasix on hold secondary to to hypercalcemia/slight worsening of creatinine and patient getting IV fluids  -Will stop IV fluids as calcium and creatinine seems to be better  -Resume PTA Aldactone/Lasix if blood pressure persistently elevated    Non-Insulin dependent type 2 diabetes  PTA Regimen: Semaglutide 0.5 q7days  Last HbA1c 5.8 on 11/15/2022  - Hypoglycemia protocol  - Sliding scale insulin Medium  - Consistent CHO diet - 60gm per meaL     H/O perforated duodenal ulcer 12/29/2019   * Subsequent prolonged hospitalizations for intra-abdominal infections requiring exploratory laparotomy, TRUONG, drain placement with perforation, and wound vac, readmission 3/13/20-  3/16/20 for sepsis and intra-abdominal infections and abdominal wall  "cellulitis which were treated with antibiotics.  - Awareness.          Diet: Combination Diet Regular Diet; Moderate Consistent Carb (60 g CHO per Meal) Diet    DVT Prophylaxis: Pneumatic Compression Devices  Lundberg Catheter: Not present  Lines: None     Cardiac Monitoring: None  Code Status: Full Code      Clinically Significant Risk Factors           # Hypercalcemia: Highest Ca = 11.6 mg/dL in last 2 days, will monitor as appropriate        # Hypertension: Noted on problem list        # Obesity: Estimated body mass index is 36.61 kg/m  as calculated from the following:    Height as of this encounter: 1.651 m (5' 5\").    Weight as of this encounter: 99.8 kg (220 lb)., PRESENT ON ADMISSION     # Asthma: noted on problem list        Disposition Plan     Expected Discharge Date: 10/31/2023      Destination: home with family              Eufemia Shanks MD  Hospitalist Service  [facility]  Securely message with [employer] (more info)  Text page via [employer] Paging/Directory   ______________________________________________________________________    Interval History   Continues to have severe spasmodic pain on her lower abdomen/urethra.  Pain medicine/baclofen does not seem to be helping.  Otherwise no other complaints.    Physical Exam   Vital Signs: Temp: 97.5  F (36.4  C) Temp src: Oral BP: 114/62 Pulse: 50   Resp: 18 SpO2: 97 % O2 Device: None (Room air)    Weight: 220 lbs 0 oz    Exam:  Constitutional: Awake, alert and no distress. Appears comfortable  Head: Normocephalic. No masses, lesions, tenderness or abnormalities  ENMT: ENT exam normal, no neck nodes or sinus tenderness  Cardiovascular: RRR.  No murmurs, no rubs or JVD  Respiratory: Normal WOB,b/l equal air entry, no wheezes or crackles   Gastrointestinal: Abdomen soft, suprapubic catheter in place, uncomfortable with palpation.  BS normal. No masses, organomegaly  : Deferred  Extremities : No edema , no clubbing or cyanosis      Medical " Decision Making       40 MINUTES SPENT BY ME on the date of service doing chart review, history, exam, documentation & further activities per the note.      Data         Imaging results reviewed over the past 24 hrs:   No results found for this or any previous visit (from the past 24 hour(s)).    Recent Labs   Lab 10/31/23  0148 10/30/23  2108 10/30/23  1741 10/30/23  0827 10/30/23  0726 10/29/23  1819 10/29/23  1238   WBC  --   --   --   --  9.4  --  13.0*   HGB  --   --   --   --  13.7  --  16.2*   MCV  --   --   --   --  82  --  80   PLT  --   --   --   --  233  --  296   NA  --   --   --   --  134*  --  135   POTASSIUM  --   --   --   --  4.2  --  4.3   CHLORIDE  --   --   --   --  96*  --  95*   CO2  --   --   --   --  29  --  24   BUN  --   --   --   --  22.7  --  25.2*   CR  --   --   --   --  1.05*  --  0.94   ANIONGAP  --   --   --   --  9  --  16*   DORY  --   --   --   --  10.7*  --  11.6*   * 110* 149*   < > 92   < > 164*   ALBUMIN  --   --   --   --   --   --  4.5   PROTTOTAL  --   --   --   --   --   --  8.3   BILITOTAL  --   --   --   --   --   --  0.6   ALKPHOS  --   --   --   --   --   --  105*   ALT  --   --   --   --   --   --  42   AST  --   --   --   --   --   --  32    < > = values in this interval not displayed.

## 2023-10-31 NOTE — CONSULTS
[facility] ACUTE PAIN SERVICE    ([facility], [facility], [facility], [facility])  Pain Consult     Assessment/Plan:  Bhavani White is a 67 year old female who was admitted on 10/29/2023.  I was asked to see the patient for h/o neurogenic bladder , now with ureteral stent , ongoing spasmodic pain. Admitted for leukocytosis/UTI/SIRS. History of spinal stenosis, asthma, DMII, HTN, morbid obesity, ANIYA, cirrhosis, anxiety/depression, frequent UTIs.    shows methadone use, now taking 5mg TID. She is weaning off of the methadone. Describes pain as 7-8/10 and mostly in the form of urethral and bladder spasms.  Allergies to toradol and tramadol.     PLAN:  Acute urethral spasms, in the setting of chronic back pain/neuropathy and opioid tolerance. Underwent cystoscopy with left ureteral stent placement.  Pain is severe in the bladder and urethra.  Would add Pyridium, streamline muscle relaxers and offer only baclofen, discontinue Robaxin.  Would also add oral opioid for 2 days and then stop.  Discussed with pharmacy and belladonna opium suppositories are not an option.  Multimodal Medication Therapy:   Adjuvants:  discontinue tylenol   Opioids: Hydrocodone/acetaminophen (Norco) 5/325mg  Continue methadone TID - check QtC today   Non-medication interventions- Ice and add heating pad   Constipation Prophylaxis- add senna   Follow up /Discharge Recommendations - We recommend prescribing the following at the time of discharge:  1 day of opioid therapy or could send a prescription to [facility] or [facility] compounding pharmacy for 3.75 mg morphine/16.2 mg belladonna suppository.  These have been found to be helpful for bladder spasms.          Subjective:   Pain is rated 6/10. Baseline pain is 5/10. Most of the urethral pain is constant. The bladder spasms are constant.  She states that she has been taking methadone for some time.  She is weaning off of it currently.  She finds that if she has  acute pain the methadone prevents her from achieving any sort of pain relief.  She last made a reduction in the methadone about 1 month ago.  She states she is allergic to tramadol and Toradol.  We talked about Pyridium which she took at home.  She did not find Pyridium to be particularly helpful at home.       Tele-Visit Details    Type of service:  Video Visit    Time Service Began (time 1st connected with pt): 1200    Time Service Ended (time completely finished with pt): 1300    Video Start Time (time video started): 1219    Video End Time (time video stopped): 1236    Originating Location (pt. Location): Patient Hospital Room     Distant Location (provider location): [place]      Reason for Televisit: Pain Consult     Mode of Communication:  Video Conference via [email or web address]    Physician has received verbal consent for a video visit from the patient? Yes      ELISSA Arguello CNP        Sepsis, due to unspecified organism, unspecified whether acute organ dysfunction present (H)   Patient Active Problem List   Diagnosis    Chronic low back pain    Moderate major depression (H)    Anxiety    Type 2 diabetes mellitus without complication, without long-term current use of insulin (H)    Hypothyroidism due to acquired atrophy of thyroid    Lumbar spinal stenosis    Fibromyalgia, Chronic Pain -- On Methadone    Osteoarthritis    Hypertension goal BP (blood pressure) < 140/90    Health Care Home    Neuropathy    Hyperlipidemia LDL goal <100    Recurrent UTI (urinary tract infection)    Controlled substance agreement signed 10/24/2014    Methadone maintenance therapy patient (H)    Wheelchair bound    Morbid obesity (H)    Asthma    Neurogenic bladder, S/P Suprapubic Catheter     Ventral hernia    Osteoarthritis of cervical spine with myelopathy    DISH (diffuse idiopathic skeletal hyperostosis)    Other cystostomy status (H)    NSTEMI (non-ST elevated myocardial infarction) (H)    Fusion of spine of  "cervical region    ANIYA -- noncompliant with CPAP    MRSA (methicillin resistant staph aureus) culture positive    Pain medication agreement    Chronic diastolic (congestive) heart failure (H)    Paraplegia (H)    Chronic kidney disease, stage 2 (mild)    Recurrent major depressive disorder, in full remission (H24)    Stress-induced cardiomyopathy    Encounter for screening mammogram for breast cancer    Screen for colon cancer    Asymptomatic postmenopausal status    Urinary tract infection associated with catheterization of urinary tract, unspecified indwelling urinary catheter type, initial encounter     Sepsis, due to unspecified organism, unspecified whether acute organ dysfunction present (H)        History   Drug Use    Types: Marijuana     Comment: Medical canibis, methadone for chronic back pain         Tobacco Use      Smoking status: Never      Smokeless tobacco: Never         acetaminophen  975 mg Oral TID    cefTRIAXone  2 g Intravenous Q24H    DULoxetine  120 mg Oral QAM    fluticasone-vilanterol  1 puff Inhalation Daily    gabapentin  1,600 mg Oral TID    insulin aspart  1-7 Units Subcutaneous TID AC    insulin aspart  1-5 Units Subcutaneous At Bedtime    levothyroxine  88 mcg Oral Daily    methadone  5 mg Oral TID    miconazole   Topical BID    oxyBUTYnin ER  15 mg Oral Daily    rosuvastatin  20 mg Oral Daily    senna-docusate  1 tablet Oral BID    Or    senna-docusate  2 tablet Oral BID    sodium chloride (PF)  3 mL Intracatheter Q8H    traZODone  150 mg Oral At Bedtime       Objective:  Vital signs in last 24 hours:  B/P: 114/62, T: 97.5, P: 50, R: 18   Blood pressure 114/62, pulse 50, temperature 97.5  F (36.4  C), temperature source Oral, resp. rate 18, height 1.651 m (5' 5\"), weight 99.8 kg (220 lb), SpO2 97%, not currently breastfeeding.      Weight:   Wt Readings from Last 2 Encounters:   10/29/23 99.8 kg (220 lb)   11/18/22 109.7 kg (241 lb 13.5 oz)           Intake/Output:    Intake/Output " Summary (Last 24 hours) at 10/31/2023 1207  Last data filed at 10/31/2023 1155  Gross per 24 hour   Intake 2203 ml   Output 2625 ml   Net -422 ml        Review of Systems:   As per subjective, all others negative.    Physical Exam:   Patient is alert and talkative during the visit.     Imaging:  Personally Reviewed.    Results for orders placed or performed during the hospital encounter of 10/29/23   CT Abdomen Pelvis w Contrast    Impression    IMPRESSION:   1.  4 mm stone at ureteropelvic junction on the left with minimal if any proximal hydronephrosis.  2.  No pyelonephritis demonstrated by CT although mild pyelonephritis can be occult.  3.  Otherwise, stable exam since comparison.          Lab Results:  Personally Reviewed.   Last Comprehensive Metabolic Panel:  Sodium   Date Value Ref Range Status   10/30/2023 134 (L) 135 - 145 mmol/L Final     Comment:     Reference intervals for this test were updated on 09/26/2023 to more accurately reflect our healthy population. There may be differences in the flagging of prior results with similar values performed with this method. Interpretation of those prior results can be made in the context of the updated reference intervals.    03/19/2021 135 133 - 144 mmol/L Final     Potassium   Date Value Ref Range Status   10/30/2023 4.2 3.4 - 5.3 mmol/L Final   06/30/2022 4.6 3.4 - 5.3 mmol/L Final   03/19/2021 4.4 3.4 - 5.3 mmol/L Final     Chloride   Date Value Ref Range Status   10/30/2023 96 (L) 98 - 107 mmol/L Final   06/30/2022 101 94 - 109 mmol/L Final   03/19/2021 101 94 - 109 mmol/L Final     Carbon Dioxide   Date Value Ref Range Status   03/19/2021 33 (H) 20 - 32 mmol/L Final     Carbon Dioxide (CO2)   Date Value Ref Range Status   10/30/2023 29 22 - 29 mmol/L Final   06/30/2022 29 20 - 32 mmol/L Final     Anion Gap   Date Value Ref Range Status   10/30/2023 9 7 - 15 mmol/L Final   06/30/2022 6 3 - 14 mmol/L Final   03/19/2021 1 (L) 3 - 14 mmol/L Final     Glucose    Date Value Ref Range Status   06/30/2022 96 70 - 99 mg/dL Final   03/19/2021 96 70 - 99 mg/dL Final     Comment:     Non Fasting     GLUCOSE BY METER POCT   Date Value Ref Range Status   10/31/2023 97 70 - 99 mg/dL Final     Urea Nitrogen   Date Value Ref Range Status   10/30/2023 22.7 8.0 - 23.0 mg/dL Final   06/30/2022 29 7 - 30 mg/dL Final   03/19/2021 18 7 - 30 mg/dL Final     Creatinine   Date Value Ref Range Status   10/30/2023 1.05 (H) 0.51 - 0.95 mg/dL Final   03/19/2021 0.87 0.52 - 1.04 mg/dL Final     GFR Estimate   Date Value Ref Range Status   10/30/2023 58 (L) >60 mL/min/1.73m2 Final   03/19/2021 69 >60 mL/min/[1.73_m2] Final     Comment:     Starting 12/18/2018, serum creatinine based estimated GFR (eGFR) will be   calculated using the Chronic Kidney Disease Epidemiology Collaboration   (CKD-EPI) equation.       GFR, ESTIMATED POCT   Date Value Ref Range Status   10/23/2023 50 (L) >60 mL/min/1.73m2 Final     Calcium   Date Value Ref Range Status   10/30/2023 10.7 (H) 8.8 - 10.2 mg/dL Final   03/19/2021 10.0 8.5 - 10.1 mg/dL Final        UA:   Amphetamine Qual Urine   Date Value Ref Range Status   12/29/2019 Negative NEG^Negative Final     Comment:     Cutoff for a negative amphetamine is 500 ng/mL or less.     Barbiturates Qual Urine   Date Value Ref Range Status   12/29/2019 Negative NEG^Negative Final     Comment:     Cutoff for a negative barbiturate is 200 ng/mL or less.     Benzodiazepine Qual Urine   Date Value Ref Range Status   12/29/2019 Negative NEG^Negative Final     Comment:     Cutoff for a negative benzodiazepine is 200 ng/mL or less.     Cannabinoids Qual Urine   Date Value Ref Range Status   12/29/2019 Positive (A) NEG^Negative Final     Comment:     Cutoff for a positive cannabinoid is greater than 50 ng/mL. This is an   unconfirmed screening result to be used for medical purposes only.       Cocaine Qual Urine   Date Value Ref Range Status   12/29/2019 Negative NEG^Negative Final      Comment:     Cutoff for a negative cocaine is 300 ng/mL or less.     Opiates Qualitative Urine   Date Value Ref Range Status   12/29/2019 Negative NEG^Negative Final     Comment:     Cutoff for a negative opiate is 300 ng/mL or less.     PCP Qual Urine   Date Value Ref Range Status   12/29/2019 Negative NEG^Negative Final     Comment:     Cutoff for a negative PCP is 25 ng/mL or less.              -------------------------- BILLING ON TIME ------------------------------------------------------------------------------------------------------------  60 MINUTES SPENT BY ME on the date of service doing chart review, history, exam, documentation & further activities per the note.        Fouzia Thomas APRN, CNS-BC, CNP, ACHPN  Acute Care Pain Management Program   Hours of pain coverage 7a-1700- after 1700 please call the house officer   [facility] ([place]alph AYERS, [place], [place], [place])   Page via [employer]- Click HERE to page Fouzia or Zoila text web console

## 2023-10-31 NOTE — PLAN OF CARE
Goal Outcome Evaluation:    Summary: UTI     DATE & TIME: 4993-5062 10/30/23     Cognitive Concerns/ Orientation: A&Ox4   BEHAVIOR & AGGRESSION TOOL COLOR: Green  ABNL VS/O2: VSS on RA   MOBILITY: Assist x2- not oob this shift. Wheelchair bound at baseline d/t spinal stenosis   PAIN MANAGMENT: C/o back pain, bladder/back spasms- PRN Dilaudid given x3 and PRN baclofen/robaxin   DIET: Mod Carb,   BOWEL/BLADDER: Continent of Bowel. Suprapubic cath in place. No BM overnight   ABNL LAB/BG: B/110   DRAIN/DEVICES: Suprapubic cath in place for neurogenic baldder, pink output   L PIV- infusing NS @100 ml/hr  TELEMETRY RHYTHM: n/a   SKIN: Scattered scabs on Right and Left shin  TESTS/PROCEDURES: Left Ureteral stent placement   D/C DAY/GOALS/PLACE: Pending

## 2023-10-31 NOTE — PROGRESS NOTES
[facility] Urology Progress Note          Assessment and Plan:       Neurogenic bladder secondary to spinal stenosis, chronic SPT, routine botox injections, recurrent UTIs (Dr. Rios at [facility])    Sepsis    Urinary tract infection associated with catheterization of urinary tract      Assessment: POD 1 cysto, left ureteral stent placement    Plan: Has follow-up with Dr. Rios's team scheduled 11/6/23. Staff msg sent to update team on her admission.   Could discharge today from our standpoint on 14 day course of cephalosporin as long as final C&S have follow-up to confirm correct antibiotic.   Will follow in the periphery. Please call with questions.   Dr. Shen updated.     Yisel Contreras PA-C  [facility] Urology  [phone]                   Interval History:     Feeling so much better. Tolerating stent. Urine pale pink.             Medications:     Current Facility-Administered Medications Ordered in Epic   Medication Dose Route Frequency Last Rate Last Admin    acetaminophen (TYLENOL) tablet 975 mg  975 mg Oral TID   975 mg at 10/30/23 1009    albuterol (PROVENTIL HFA/VENTOLIN HFA) inhaler  2 puff Inhalation Q6H PRN   2 puff at 10/30/23 0319    albuterol (PROVENTIL) neb solution 2.5 mg  2.5 mg Nebulization Q4H PRN        baclofen (LIORESAL) tablet 10 mg  10 mg Oral TID PRN   10 mg at 10/31/23 0149    carboxymethylcellulose PF (REFRESH PLUS) 0.5 % ophthalmic solution 1 drop  1 drop Both Eyes Q1H PRN        cefTRIAXone (ROCEPHIN) 2 g vial to attach to  ml bag for ADULTS or NS 50 ml bag for PEDS  2 g Intravenous Q24H 200 mL/hr at 10/30/23 1220 2 g at 10/30/23 1220    glucose gel 15-30 g  15-30 g Oral Q15 Min PRN        Or    dextrose 50 % injection 25-50 mL  25-50 mL Intravenous Q15 Min PRN        Or    glucagon injection 1 mg  1 mg Subcutaneous Q15 Min PRN        DULoxetine (CYMBALTA) DR capsule 120 mg  120 mg Oral QAM   120 mg at 10/30/23 1132    fluticasone-vilanterol (BREO ELLIPTA) 100-25  MCG/ACT inhaler 1 puff  1 puff Inhalation Daily        gabapentin (NEURONTIN) tablet 1,600 mg  1,600 mg Oral TID   1,600 mg at 10/30/23 1946    HYDROmorphone (DILAUDID) injection 0.2 mg  0.2 mg Intravenous Q2H PRN   0.2 mg at 10/31/23 0201    insulin aspart (NovoLOG) injection (RAPID ACTING)  1-7 Units Subcutaneous TID AC   1 Units at 10/30/23 1809    insulin aspart (NovoLOG) injection (RAPID ACTING)  1-5 Units Subcutaneous At Bedtime        levothyroxine (SYNTHROID/LEVOTHROID) tablet 88 mcg  88 mcg Oral Daily   88 mcg at 10/30/23 1009    lidocaine (LMX4) cream   Topical Q1H PRN        lidocaine 1 % 0.1-1 mL  0.1-1 mL Other Q1H PRN        melatonin tablet 1 mg  1 mg Oral At Bedtime PRN        methadone (DOLOPHINE) tablet 5 mg  5 mg Oral TID   5 mg at 10/30/23 1945    methocarbamol (ROBAXIN) tablet 500 mg  500 mg Oral 4x Daily PRN   500 mg at 10/30/23 1945    miconazole (MICATIN) 2 % powder   Topical BID   Given at 10/30/23 2114    naloxone (NARCAN) injection 0.2 mg  0.2 mg Intravenous Q2 Min PRN        Or    naloxone (NARCAN) injection 0.4 mg  0.4 mg Intravenous Q2 Min PRN        Or    naloxone (NARCAN) injection 0.2 mg  0.2 mg Intramuscular Q2 Min PRN        Or    naloxone (NARCAN) injection 0.4 mg  0.4 mg Intramuscular Q2 Min PRN        ondansetron (ZOFRAN ODT) ODT tab 4 mg  4 mg Oral Q6H PRN        Or    ondansetron (ZOFRAN) injection 4 mg  4 mg Intravenous Q6H PRN   4 mg at 10/29/23 2005    oxyBUTYnin ER (DITROPAN XL) 24 hr tablet 15 mg  15 mg Oral Daily   15 mg at 10/30/23 1009    rosuvastatin (CRESTOR) tablet 20 mg  20 mg Oral Daily   20 mg at 10/30/23 1009    senna-docusate (SENOKOT-S/PERICOLACE) 8.6-50 MG per tablet 1 tablet  1 tablet Oral BID   1 tablet at 10/30/23 1946    Or    senna-docusate (SENOKOT-S/PERICOLACE) 8.6-50 MG per tablet 2 tablet  2 tablet Oral BID   2 tablet at 10/29/23 2000    sodium chloride (PF) 0.9% PF flush 3 mL  3 mL Intracatheter q1 min prn   3 mL at 10/30/23 0056    sodium chloride  (PF) 0.9% PF flush 3 mL  3 mL Intracatheter Q8H   3 mL at 10/30/23 1028    sodium chloride 0.9 % infusion   Intravenous Continuous 100 mL/hr at 10/31/23 0201 New Bag at 10/31/23 0201    traZODone (DESYREL) tablet 150 mg  150 mg Oral At Bedtime   150 mg at 10/30/23 2116     No current [facility]-ordered outpatient medications on file.                  Physical Exam:   Vitals were reviewed  Patient Vitals for the past 8 hrs:   BP Temp Temp src Pulse Resp SpO2   10/31/23 0810 114/62 97.5  F (36.4  C) Oral 50 18 97 %   10/31/23 0220 -- -- -- -- 18 --   10/31/23 0200 -- -- -- -- 18 --   10/31/23 0144 -- -- -- -- 18 --   10/31/23 0129 124/64 97.7  F (36.5  C) Oral 74 18 95 %     GEN: NAD, lying in bed  EYES: EOMI  : pale pink           Data:     Lab Results   Component Value Date    NTBNPI 2,200 (H) 01/18/2020     Lab Results   Component Value Date    WBC 9.4 10/30/2023    WBC 13.0 (H) 10/29/2023    WBC 9.2 10/23/2023    HGB 13.7 10/30/2023    HGB 16.2 (H) 10/29/2023    HGB 14.2 10/23/2023    HCT 43.1 10/30/2023    HCT 49.9 (H) 10/29/2023    HCT 43.8 10/23/2023    MCV 82 10/30/2023    MCV 80 10/29/2023    MCV 80 10/23/2023     10/30/2023     10/29/2023     10/23/2023     Lab Results   Component Value Date    INR 0.99 10/23/2023    INR 1.09 06/15/2020    INR 1.14 06/11/2020

## 2023-10-31 NOTE — CONSULTS
"BRIEF NUTRITION ASSESSMENT    REASON FOR ASSESSMENT:  Bhavani White is a 67 year old female seen by Registered Dietitian for Nutrition Admission Risk Screen Received -   Have you recently lost weight without trying ? - \"yes - unsure\"  Have you been eating poorly due to a decreased appetite ?- \"yes\"    NUTRITION HISTORY:  Patient reports low appetite due to Ozempic. She takes this mostly for diabetes, but there was also the \"side effect\" of weight loss discussed when she was prescribed.     Pt unable to report how much weight she has lost, just that it has been progressive over the past 6 mos - 1 year or so.   She denies any significant side effects such as severe constipation.     Drinks a protein shake in the morning, a main meal later in the day.     CURRENT DIET AND INTAKE:  Diet: Mod CHO            Eating %. Pt reports she ate a good meal yesterday - it was the first time she'd had an appetite in some time.     ANTHROPOMETRICS:  Height: 5' 5\"  Weight:  220 lbs 0 oz (99.8 kg) - pt unsure that this is her true weight.   Body mass index is 36.61 kg/m .   Weight Status: Obesity Grade II BMI 35-39.9  IBW:  56.8 kg   %IBW: 176%  Weight History: 8.7% loss in 1 year if wts are accurate. Pt said she wasn't sure.   Wt Readings from Last 10 Encounters:   10/29/23 99.8 kg (220 lb)   11/18/22 109.7 kg (241 lb 13.5 oz)   11/15/22 109.8 kg (242 lb)   10/10/22 112.9 kg (249 lb)   08/07/22 111.6 kg (246 lb)   06/30/22 111.9 kg (246 lb 9.6 oz)   03/02/22 116.1 kg (256 lb)   07/21/21 109.3 kg (241 lb)   07/12/21 99.8 kg (220 lb)   03/19/21 105.8 kg (233 lb 3.2 oz)       LABS:  Reviewed  Lab Results   Component Value Date    A1C 6.2 08/08/2023    A1C 5.8 11/15/2022    A1C 5.9 03/02/2022    A1C 5.8 03/19/2021    A1C 6.0 06/04/2020    A1C 7.1 01/07/2020    A1C 7.3 10/28/2019    A1C 7.2 05/21/2019       MALNUTRITION:  Visual Nutrition Focused Physical Assessment (NFPA) completed. Patient does not meet two of the following " criteria necessary for diagnosing malnutrition.     % Weight Loss:  Weight loss does not meet criteria for malnutrition   % Intake:  Decreased intake does not meet criteria for malnutrition   Subcutaneous Fat Loss:  None observed  Muscle Loss:  None observed  Fluid Retention:  None noted    NUTRITION INTERVENTION:  Nutrition Diagnosis:  No nutrition diagnosis at this time.    Implementation:  Nutrition Education:  Discussed with patient the importance of adequate protein intakes in the setting of intentional weight loss. Encouraged at least 3 meals/snacks daily.     FOLLOW UP/MONITORING:   Will re-evaluate in 7 - 10 days, or sooner, if re-consulted.    Tedoora Higgins RD, LD  Pager: [phone]  Weekend Pager: [phone]

## 2023-10-31 NOTE — PLAN OF CARE
Summary: UTI     DATE & TIME: 10/30/23 2300-10/31/23 0730     Cognitive Concerns/ Orientation: A&Ox4   BEHAVIOR & AGGRESSION TOOL COLOR: Green  ABNL VS/O2: VSS on RA   MOBILITY: Assist x2- not oob this shift. Wheelchair bound at baseline d/t spinal stenosis, pt encouraged to weight shift, moves well independently in bed.  PAIN MANAGMENT: PRN Dilaudid given x1 and PRN baclofen x1 for bladder spasms.  DIET: Mod Carb  BOWEL/BLADDER: Continent of Bowel. Suprapubic cath in place. No BM overnight   ABNL LAB/BG: B  DRAIN/DEVICES: Suprapubic cath in place for neurogenic baldder, slight pink output clearing to yellow  L PIV- infusing NS @100 ml/hr  TELEMETRY RHYTHM: NA  SKIN: Scattered scabs on R and L shin  TESTS/PROCEDURES: L Ureteral stent placement done 10/30/23  D/C DAY/GOALS/PLACE: Pending   OTHER IMPORTANT INFO: Contact precautions maintained.

## 2023-10-31 NOTE — PROGRESS NOTES
"   10/31/23 9959   Appointment Info   Signing Clinician's Name / Credentials (PT) Yoandy Toussaint DPT   Living Environment   People in Home spouse   Current Living Arrangements apartment   Home Accessibility no concerns   Living Environment Comments Pt lives in apartment with spouse, spouse also has disability (previous CVA and uses walker, not able to provide physical assistance). Pt has nurse 1x/month, cleaning assistance once every 2 weeks.   Self-Care   Usual Activity Tolerance moderate   Current Activity Tolerance moderate   Equipment Currently Used at Home colostomy/ostomy supplies;grab bar, toilet;grab bar, tub/shower;shower chair;wheelchair, power   Fall history within last six months no   Activity/Exercise/Self-Care Comment Pt is IND with dressing, bathing. Does not ambulate. Pt performs stand-pivot transfers to [facility] for mobility.   General Information   Onset of Illness/Injury or Date of Surgery 10/29/23   Referring Physician Steven Shen MD   Patient/Family Therapy Goals Statement (PT) pain control, go home and perform transfers.   Pertinent History of Current Problem (include personal factors and/or comorbidities that impact the POC) Pt is 68 yo female who, per chart, \"past medical history spinal stenosis, asthma, DMII, hypertension, hypothyroid, morbid obesity, sleep apnea non-compliant with CPAP, hepatic cirrhosis, chronic pain syndrome, anxiety/depression and neurogenic bladder with chronic indwelling catheter with frequent UTIs who presented to [facility] with concerns of urinary tract infection.\"   Existing Precautions/Restrictions fall   Cognition   Affect/Mental Status (Cognition) WFL   Orientation Status (Cognition) oriented x 4   Pain Assessment   Patient Currently in Pain   (7/10 in groin area, low back, feet and hands. Baseline pain is at 5/10 per pt report.)   Posture    Posture Forward head position;Protracted shoulders   Range of Motion (ROM)   ROM Comment deficits to L LE due to " "previous TKA and fx which resulted in L LE femority.   Strength (Manual Muscle Testing)   Strength (Manual Muscle Testing) Deficits observed during functional mobility   Bed Mobility   Bed Mobility supine-sit   Comment, (Bed Mobility) SBA, from slighlty elevated HOB, using L bed rail and slow to progress to EOB.   Transfers   Transfers sit-stand transfer   Comment, (Transfers) CGA, from elevated bed height (to bed height near baseline at home per pt report).   Gait/Stairs (Locomotion)   Comment, (Gait/Stairs) non ambulatory at baseline.   Balance   Balance Comments significant strenth deficits in LEs, requires UE support for transfers and OOB mobility.   Sensory Examination   Sensory Perception Comments feet and hands \"bee stinging sensation\" which per pt reports is related to severe neuropathy.   Clinical Impression   Criteria for Skilled Therapeutic Intervention Yes, treatment indicated   PT Diagnosis (PT) impaired functional mobility   Influenced by the following impairments pain, decreased strength, impaired sensation.   Functional limitations due to impairments difficulty with bed mobility and transfers.   Clinical Presentation (PT Evaluation Complexity) stable   Clinical Presentation Rationale clincial judgement   Clinical Decision Making (Complexity) low complexity   Planned Therapy Interventions (PT) balance training;bed mobility training;gait training;home exercise program;neuromuscular re-education;ROM (range of motion);stair training;strengthening;stretching;transfer training;progressive activity/exercise   Risk & Benefits of therapy have been explained evaluation/treatment results reviewed;care plan/treatment goals reviewed;risks/benefits reviewed;current/potential barriers reviewed;participants voiced agreement with care plan;participants included;patient   PT Total Evaluation Time   PT Eval, Low Complexity Minutes ([id number]) 10   Physical Therapy Goals   PT Frequency Daily   PT Predicted Duration/Target " Date for Goal Attainment 11/07/23   PT Goals Bed Mobility;Transfers;Wheelchair Mobility   PT: Bed Mobility Independent;Supine to/from sit   PT: Transfers Supervision/stand-by assist;Sit to/from stand;Bed to/from chair;Assistive device   PT: Wheelchair Mobility 150 feet;Caregiver SBA;power wheelchair  (if pt can get PWC here in hospital.)   PT Discharge Planning   PT Plan progress IND with bed mob, review standpivot transfers (w/o AD at baseline, trial with AD if helps?)   PT Discharge Recommendation (DC Rec) home with assist   PT Rationale for DC Rec Pt at baseline lives in apartment and is w/c bound, uses PWC and performs stand-pivot transfers IND. Pt is currently near baseline, deficits in strength noted but pt able to demonstrate stand-pivot transfer L/R from bed <> recliner with close CGA. Recommend 1 more PT session to review for safety prior to discharge home.   PT Brief overview of current status bed mob SBA, SPT with CGA (better towards R side).   Total Session Time   Total Session Time (sum of timed and untimed services) 10

## 2023-10-31 NOTE — PLAN OF CARE
Neuro- A&Ox4  Most Recent Vitals- Temp: 98  F (36.7  C) Temp src: Oral BP: (!) 156/86 Pulse: 100   Resp: 18 SpO2: 93 % O2 Device: None (Room air)   Tele/Cardiac- na  Resp- LS dim on RA  Activity- WC bound at baseline  Pain- c/o pain to procedure site. Difficult to control meds given  Drips- none  Drains/Tubes- PIV  Skin- scattered bruising  GI/- supra pubic catheter  Aggression Color- Green  COVID status- Negative  Plan- pending  Misc-     Denisha Mena, RN Goal Outcome Evaluation:

## 2023-11-01 NOTE — PROGRESS NOTES
Care Management Discharge Note    Discharge Date: 11/01/2023       Discharge Disposition: Home, Home Care    Discharge Services:  None    Discharge DME: None    Discharge Transportation: health plan transportation    Private pay costs discussed: Not applicable    Does the patient's insurance plan have a 3 day qualifying hospital stay waiver?  No      Education Provided on the Discharge Plan: Yes  Persons Notified of Discharge Plans: Patient  Patient/Family in Agreement with the Plan: yes    Handoff Referral Completed: Yes    Additional Information:  Patient discharging today. States she is feeling better. Requested transportation.  [employer] transportation by w/c set for 3:34 pm.     Mert Sewell RN [phone]

## 2023-11-01 NOTE — PLAN OF CARE
Summary: UTI   DATE & TIME: 10/31/23 8853-7716  Cognitive Concerns/ Orientation: A & O x 4   BEHAVIOR & AGGRESSION TOOL COLOR: Green  ABNL VS/O2: VSS on RA   MOBILITY: A2. Wheelchair bound at baseline d/t spinal stenosis, pt can weight shift, moves well independently in bed. Refused repositioning. Up in recliner x1.   PAIN MANAGMENT: Pt still complaining of severe bladder spasms, severe pain to urethra. PRN Dilaudid given x 2 and PRN baclofen x2 , Robaxin x1, and Norco x1.  DIET: Regular/Mod Carb  BOWEL/BLADDER: Continent of Bowel. Suprapubic cath in place. No BM overnight   ABNL LAB/BG: WDL  DRAIN/DEVICES: Suprapubic cath in place for neurogenic baldder, Hematuria most of the day Urology aware. Clearing to yellow at end of shift. PIV SL  TELEMETRY RHYTHM: NA  SKIN: Scattered Abrasions, Bruising; dry and cracked skin on heels; redness in pubic region; Suprapubic catheter; previous surgical scars.   TESTS/PROCEDURES: L Ureteral stent placement done 10/30/23  D/C DAY/GOALS/PLACE: Pending   OTHER IMPORTANT INFO: Contact precautions maintained. Pt states she does not feel better. T-pump in place. All medicines given as possible for pain/spasm relief.

## 2023-11-01 NOTE — DISCHARGE SUMMARY
"[facility]  Hospitalist Discharge Summary      Date of Admission:  10/29/2023  Date of Discharge:  11/1/2023  Discharging Provider: Eufemia Shanks MD  Discharge Service: Hospitalist Service    Discharge Diagnoses   History of recurrent UTI  Renal stone  Neurogenic bladder with chronic indwelling suprapubic catheter  Hypercalcemia, improved prior to discharge  ROLANDO, resolved prior to discharge  Chronic pain syndrome  Fibromyalgia  Chronic diastolic heart failure  Hypertension  Hyperlipidemia  Asthma  Depression  Anxiety  Hypothyroidism  Morbid obesity  Type 2 diabetes mellitus  History of perforated duodenal ulcer  Sleep apnea noncompliant with CPAP    Clinically Significant Risk Factors     # Obesity: Estimated body mass index is 34.82 kg/m  as calculated from the following:    Height as of this encounter: 1.651 m (5' 5\").    Weight as of this encounter: 94.9 kg (209 lb 3.5 oz).       Follow-ups Needed After Discharge   Follow-up Appointments     Follow-up and recommended labs and tests       Follow up with primary care provider, Hailee Henry, within 7 days for   hospital follow- up.  The following labs/tests are recommended: CBC/BMP.    Follow-up with urology as advised.          Unresulted Labs Ordered in the Past 30 Days of this Admission       Date and Time Order Name Status Description    10/30/2023  4:23 PM PTH Related Peptide Test In process     10/29/2023  2:13 PM Blood Culture Peripheral Blood Preliminary     10/29/2023  2:13 PM Blood Culture Peripheral Blood Preliminary         These results will be followed up by PCP    Discharge Disposition   Discharged to home  Condition at discharge: Stable    Hospital Course   Bhavani White is a wheelchair bound 67 year-old female with past medical history spinal stenosis, asthma, DMII, hypertension, hypothyroid, morbid obesity, sleep apnea non-compliant with CPAP, hepatic cirrhosis, chronic pain syndrome, anxiety/depression and neurogenic " bladder with chronic indwelling catheter with frequent UTIs who presented to [facility] with concerns of urinary tract infection.      SIRS  History of recurrent UTIs  Neurogenic bladder with chronic indwelling nguyen catheter  4 mm left ureteropelvic junction stone  * Patient with history of spinal stenosis, wheelchair bound with neurogenic bladder s/p suprapubic catheter placed in 2009.  Suprapubic catheter was changed in the ED  * Patient afebrile.  - CT CAP with 4 mm stone at ureteropelvic junction on the left with minimal if any proximal hydronephrosis.  No pyelonephritis demonstrated by CT although mild pyelonephritis can be occult.  -Appreciate urology input.  Underwent cystoscopy with left retrograde pyelogram and left ureteral stent placement 10/30  -Started on ceftriaxone on admission, blood culture negative to date, urine culture with multiple organisms  -Given complicated urological history ID was consulted who recommended no further antibiotic treatment  -Patient continued to have abdominal spasm postoperative, not improving even with baclofen/opiates.  Chronically on opiates at home  -Pain management was consulted, appreciate recommendation  -Please see recommendation from pain management for details    Hypercalcemia  CKD versus ROLANDO, mild  -Hypercalcemia appears chronic at least dating back to January 2023, creatinine at baseline ranges anywhere from 0.9-1.1  -Calcium was 11.6 on admission, ionized calcium within normal limit, improved to 9.7 with hydration  -PTH within normal limit.  PTH RP pending  -Renal function and creatinine has improved.   -Outpatient follow-up with PCP    Chronic medical condition  Hypertension   Hyperlipidemia   Asthma   Depression/Anxiety   Hypothyroid  Morbid obesity  Sleep apnea non-compliant with CPAP - awareness  Hepatic cirrhosis   Chronic pain syndrome/Fibromyalgia with opioid dependence  Chronic Diastolic Heart Failure-euvolemic at the time of discharge  Non-Insulin  dependent type 2 diabetes  PTA medication continued at the time of discharge     H/O perforated duodenal ulcer 12/29/2019   * Subsequent prolonged hospitalizations for intra-abdominal infections requiring exploratory laparotomy, TRUONG, drain placement with perforation, and wound vac, readmission 3/13/20-  3/16/20 for sepsis and intra-abdominal infections and abdominal wall cellulitis which were treated with antibiotics.  - Awareness.    Consultations This Hospital Stay   PHYSICAL THERAPY ADULT IP CONSULT  CARE MANAGEMENT / SOCIAL WORK IP CONSULT  UROLOGY IP CONSULT  PAIN MANAGEMENT ADULT IP CONSULT  INFECTIOUS DISEASES IP CONSULT  VASCULAR ACCESS ADULT IP CONSULT    Code Status   Full Code    Time Spent on this Encounter   I, Eufemia Shanks MD, personally saw the patient today and spent greater than 30 minutes discharging this patient.       Eufemia Shanks MD  [place] [address] [facility]  [address] [place] [address]  Phone: [phone]  ______________________________________________________________________    Physical Exam   Vital Signs: Temp: 98.4  F (36.9  C) Temp src: Oral BP: (!) 141/66 Pulse: 62   Resp: 18 SpO2: 95 % O2 Device: None (Room air)    Weight: 209 lbs 3.46 oz  Exam:  Constitutional: Awake, alert and no distress. Appears comfortable  Head: Normocephalic. No masses, lesions, tenderness or abnormalities  ENMT: ENT exam normal, no neck nodes or sinus tenderness  Cardiovascular: RRR.  No murmurs, no rubs or JVD  Respiratory: Normal WOB,b/l equal air entry, no wheezes or crackles   Gastrointestinal: Abdomen soft, suprapubic catheter in place. BS normal. No masses, organomegaly  : Deferred  Extremities : No edema , no clubbing or cyanosis         Primary Care Physician   [facility]    Discharge Orders      Reason for your hospital stay    Renal stone, abdominal pain secondary to renal stone.     Follow-up and recommended labs and tests     Follow up with primary care  provider, Hailee Henry, within 7 days for hospital follow- up.  The following labs/tests are recommended: CBC/BMP.    Follow-up with urology as advised.     Activity    Your activity upon discharge: activity as tolerated     Diet    Follow this diet upon discharge: Orders Placed This Encounter      Combination Diet Regular Diet; Moderate Consistent Carb (60 g CHO per Meal) Diet       Significant Results and Procedures   Results for orders placed or performed during the hospital encounter of 10/29/23   CT Abdomen Pelvis w Contrast    Narrative    EXAM: CT ABDOMEN AND PELVIS WITH CONTRAST  LOCATION: [facility]  DATE: 10/29/2023    INDICATION: Abdominal pain. Possible pyelonephritis.  COMPARISON: 10/23/2023.  TECHNIQUE: CT scan of the abdomen and pelvis was performed following injection of IV contrast. Multiplanar reformats were obtained. Dose reduction techniques were used.  CONTRAST: 111 mL Isovue 370.    FINDINGS:   LOWER CHEST: Elevated right hemidiaphragm. No effusions.    HEPATOBILIARY: Normal contour with no significant mass. No bile duct dilatation. Calcified gallstones. Small amount of perihepatic fluid.    PANCREAS: No significant mass, duct dilatation, or inflammatory change.    SPLEEN: Normal size.    ADRENAL GLANDS: No significant nodules.    KIDNEYS/BLADDER: 4 mm stone at the ureteropelvic junction with minimal if any proximal hydronephrosis. Benign cyst requiring no follow-up again evident.    BOWEL: No obstruction or inflammatory change. Periumbilical hernia containing nonobstructed small bowel.    LYMPH NODES: No lymphadenopathy.    VASCULATURE: There are mild atherosclerotic changes of the visualized aorta and its branches. There is no evidence of aortic dissection or aneurysm.    PELVIC ORGANS: No pelvic masses.    MUSCULOSKELETAL: No frankly destructive bony lesions.      Impression    IMPRESSION:   1.  4 mm stone at ureteropelvic junction on the left with minimal if any  proximal hydronephrosis.  2.  No pyelonephritis demonstrated by CT although mild pyelonephritis can be occult.  3.  Otherwise, stable exam since comparison.     XR Surgery ROZ Fluoro Less Than 5 Min w Stills    Narrative    This exam was marked as non-reportable because it will not be read by a   radiologist or a [place] non-radiologist provider.           *Note: Due to a large number of results and/or encounters for the requested time period, some results have not been displayed. A complete set of results can be found in Results Review.       Discharge Medications   Current Discharge Medication List        START taking these medications    Details   HYDROcodone-acetaminophen (NORCO) 5-325 MG tablet Take 1 tablet by mouth every 6 hours as needed for severe pain  Qty: 4 tablet, Refills: 0    Associated Diagnoses: Pain      lidocaine (XYLOCAINE) 5 % external ointment Apply topically every 4 hours as needed for moderate pain (urethral pain)  Qty: 50 g, Refills: 0    Associated Diagnoses: Urethral pain      phenazopyridine (PYRIDIUM) 100 MG tablet Take 1 tablet (100 mg) by mouth 3 times daily as needed for urinary tract discomfort  Qty: 30 tablet, Refills: 0    Associated Diagnoses: Pain           CONTINUE these medications which have CHANGED    Details   gabapentin (NEURONTIN) 800 MG tablet Take 2 tablets (1,600 mg) by mouth 3 times daily for 3 days 0800, 1200 and 1800  Qty: 18 tablet, Refills: 0    Associated Diagnoses: Pain           CONTINUE these medications which have NOT CHANGED    Details   albuterol (PROVENTIL) (2.5 MG/3ML) 0.083% neb solution USE 1 VIAL VIA NEBULIZATION EVERY 4 HOURS AS NEEDED  Qty: 75 mL, Refills: 1    Associated Diagnoses: Mild persistent asthma without complication      aspirin 81 MG EC tablet Take 81 mg by mouth daily      DULoxetine (CYMBALTA) 60 MG capsule Take 120 mg by mouth every morning      fluticasone-vilanterol (BREO ELLIPTA) 100-25 MCG/ACT inhaler INHALE 1 PUFF INTO THE LUNGS  DAILY  Qty: 60 each, Refills: 3    Associated Diagnoses: Mild persistent asthma without complication      furosemide (LASIX) 40 MG tablet Take 40 mg by mouth daily      levothyroxine (SYNTHROID/LEVOTHROID) 88 MCG tablet TAKE 1 TABLET(88 MCG) BY MOUTH DAILY  Qty: 90 tablet, Refills: 0    Comments: **Patient requests 90 days supply**  Associated Diagnoses: Hypothyroidism due to acquired atrophy of thyroid      lisinopril (ZESTRIL) 2.5 MG tablet Take 1 tablet (2.5 mg) by mouth daily  Qty: 90 tablet, Refills: 2    Associated Diagnoses: Chronic kidney disease, stage 2 (mild)      methadone (DOLOPHINE) 5 MG tablet Take 5 mg by mouth 3 times daily      naloxone (NARCAN) 4 MG/0.1ML nasal spray CALL 911. SPR CONTENTS OF ONE SPRAYER (0.1ML) INTO ONE NOSTRIL. REPEAT IN 2-3 MIN IF SYMPTOMS OF OPIOID EMERGENCY PERSIST, ALTERNATE NOSTRILS      oxyBUTYnin ER (DITROPAN XL) 15 MG 24 hr tablet Take 1 tablet (15 mg) by mouth daily  Qty: 30 tablet, Refills: 8    Associated Diagnoses: Neurogenic bladder      rosuvastatin (CRESTOR) 20 MG tablet Take 1 tablet (20 mg) by mouth daily  Qty: 90 tablet, Refills: 3    Associated Diagnoses: Hyperlipidemia LDL goal <100      semaglutide (OZEMPIC) 2 MG/3ML pen Inject 0.5 mg Subcutaneous every 7 days  Qty: 3 mL, Refills: 0    Associated Diagnoses: Type 2 diabetes mellitus without complication, without long-term current use of insulin (H)      senna-docusate (SENOKOT-S/PERICOLACE) 8.6-50 MG tablet Take 2 tablets by mouth 2 times daily as needed for constipation  Qty: 100 tablet, Refills: 0    Associated Diagnoses: Acute respiratory failure with hypoxia and hypercapnia (H)      spironolactone (ALDACTONE) 100 MG tablet Take 100 mg by mouth daily      traZODone (DESYREL) 150 MG tablet Take 150 mg by mouth At Bedtime      blood glucose (NO BRAND SPECIFIED) test strip Use to test blood sugar one times daily or as directed. To accompany: Blood Glucose Monitor Brands: per insurance.  Qty: 100 strip,  "Refills: 6    Associated Diagnoses: Type 2 diabetes mellitus without complication, without long-term current use of insulin (H)      blood glucose monitoring (NO BRAND SPECIFIED) meter device kit Use to test blood sugar one times daily or as directed. Preferred blood glucose meter OR supplies to accompany: Blood Glucose Monitor Brands: per insurance.  Qty: 1 kit, Refills: 0    Associated Diagnoses: Type 2 diabetes mellitus without complication, without long-term current use of insulin (H)      thin (NO BRAND SPECIFIED) lancets Use with lanceting device. To accompany: Blood Glucose Monitor Brands: per insurance.  Qty: 100 each, Refills: 6    Associated Diagnoses: Type 2 diabetes mellitus without complication, without long-term current use of insulin (H)           Allergies   Allergies   Allergen Reactions    Povidone Iodine      \"mild skin irritation\" per patient report    Toradol [Ketorolac] Other (See Comments)     Pt gets nightmares    Tramadol Other (See Comments)     "

## 2023-11-01 NOTE — TELEPHONE ENCOUNTER
Reason for Call:  Appointment Request    Patient requesting this type of appt:  Hospital/ED Follow-Up     Requested provider: Hailee Henry    Reason patient unable to be scheduled: Not within requested timeframe    When does patient want to be seen/preferred time:  ONE WEEK    Comments: HOSP [facility] UTI SEPSIS-10/29/23 TO 11/01/23-1 WEEK    Could we send this information to you in [facility] or would you prefer to receive a phone call?:   Patient would prefer a phone call   Okay to leave a detailed message?: Yes at Cell number on file:    Telephone Information:   Mobile [phone]       Call taken on 11/1/2023 at 3:08 PM by Tess MATIAS

## 2023-11-01 NOTE — PLAN OF CARE
Goal Outcome Evaluation:         Summary: UTI   DATE & TIME: 10/31/23-11/1/23 [id number]  Cognitive Concerns/ Orientation: A & O x 4   BEHAVIOR & AGGRESSION TOOL COLOR: Green  ABNL VS/O2: VSS on RA   MOBILITY: Ax2. Wheelchair bound at baseline d/t spinal stenosis, pt can weight shift, moves well independently in bed. Refused repositioning.   PAIN MANAGMENT: Pt still complaining of severe bladder spasms, severe pain to urethra. PRN Dilaudid given x 1 and scheduled methadone  DIET: Regular/Mod Carb  BOWEL/BLADDER: Continent of Bowel. Suprapubic cath in place. No BM overnight   ABNL LAB/BG: BG 77, 153  DRAIN/DEVICES: Suprapubic cath in place for neurogenic baldder, Pink/clear yellow output. PIV SL  TELEMETRY RHYTHM: NA  SKIN: Scattered Abrasions, Bruising; dry and cracked skin on heels; redness in pubic region; Suprapubic catheter; previous surgical scars.   TESTS/PROCEDURES: L Ureteral stent placement done 10/30/23  D/C DAY/GOALS/PLACE: Pending  OTHER IMPORTANT INFO: Contact precautions maintained. T-pump in place. Pt reports feeling better this shift and that spasms have improved.

## 2023-11-01 NOTE — PLAN OF CARE
"Physical Therapy Discharge Summary    Reason for therapy discharge:    Discharged to home.    Progress towards therapy goal(s). See goals on Care Plan in [facility] electronic health record for goal details.  Goals partially met.  Barriers to achieving goals:   discharge from facility.    Therapy recommendation(s):    Pt not seen by discharging therapist, per prior treating PT, \"Pt at baseline lives in apartment and is w/c bound, uses PWC and performs stand-pivot transfers IND. Pt is currently near baseline, deficits in strength noted but pt able to demonstrate stand-pivot transfer L/R from bed <> recliner with close CGA. Recommend 1 more PT session to review for safety prior to discharge home.\"      "

## 2023-11-01 NOTE — PROGRESS NOTES
[facility] ACUTE PAIN SERVICE    [facility]   Daily PAIN Progress Note        AMCOM Paging/Directory Pain  Securely message with the Grad[employer] (formerly gradsa[employer]) Web Console (learn more here)  (When I saw the patient): 11/01/23  Assessment/Plan:    Bhavani White is a 67 year old female who was admitted on 10/29/2023.  I was asked to see the patient for h/o neurogenic bladder , now with ureteral stent , ongoing spasmodic pain. Admitted for leukocytosis/UTI/SIRS. History of spinal stenosis, asthma, DMII, HTN, morbid obesity, ANIYA, cirrhosis, anxiety/depression, frequent UTIs.    shows methadone use, now taking 5mg TID. She is weaning off of the methadone. Describes pain as 7-8/10 and mostly in the form of urethral and bladder spasms.  Allergies to toradol and tramadol.   Opioids used this past 24 hrs= 0.6 mg Hydromorphone, Methadone 15 mg = 72 mg MME.    PLAN:  Acute urethral spasms, in the setting of chronic back pain/neuropathy and opioid tolerance. Underwent cystoscopy with left ureteral stent placement.  Pain is severe in the bladder and urethra.  Would add Pyridium, streamline muscle relaxers and offer only baclofen, discontinue Robaxin.  Would also add oral opioid for 2 days and then stop.  Discussed yesterday with pharmacy and belladonna opium suppositories are not an option.  Multimodal Medication Therapy:   Adjuvants:  discontinue tylenol continue chronic gabapentin 1600 mg tid, lidocaine gel to urethral area Q4 hrs prn Cr Cl 73.3 ml/min  Opioids: Hydrocodone/acetaminophen (Norco) 5/325mg  Continue methadone 5 mg  TID - check QtC 412 ms   Non-medication interventions- Ice and add heating pad   Constipation Prophylaxis- add senna   Follow up /Discharge Recommendations - We recommend prescribing the following at the time of discharge:    1 day of opioid therapy hydrocodone 5/mg APAP  # 6 tablet(s)   Consider if bladder spasms problematic [facility] or [facility] pharmacy for 3.75 mg morphine/16.2 mg  belladonna suppository.  These have been found to be helpful for bladder spasms.  Will need Gabapentin refill 3 days supply                   Subjective:   Better today, less bladder spasms  awake and alert    Using heat to abdomen    Some abdominal pain,  chronic back pain w/o flare        Sepsis, due to unspecified organism, unspecified whether acute organ dysfunction present (H)   Patient Active Problem List   Diagnosis    Chronic low back pain    Moderate major depression (H)    Anxiety    Type 2 diabetes mellitus without complication, without long-term current use of insulin (H)    Hypothyroidism due to acquired atrophy of thyroid    Lumbar spinal stenosis    Fibromyalgia, Chronic Pain -- On Methadone    Osteoarthritis    Hypertension goal BP (blood pressure) < 140/90    Health Care Home    Neuropathy    Hyperlipidemia LDL goal <100    Recurrent UTI (urinary tract infection)    Controlled substance agreement signed 10/24/2014    Methadone maintenance therapy patient (H)    Wheelchair bound    Morbid obesity (H)    Asthma    Neurogenic bladder, S/P Suprapubic Catheter     Ventral hernia    Osteoarthritis of cervical spine with myelopathy    DISH (diffuse idiopathic skeletal hyperostosis)    Other cystostomy status (H)    NSTEMI (non-ST elevated myocardial infarction) (H)    Fusion of spine of cervical region    ANIYA -- noncompliant with CPAP    MRSA (methicillin resistant staph aureus) culture positive    Pain medication agreement    Chronic diastolic (congestive) heart failure (H)    Paraplegia (H)    Chronic kidney disease, stage 2 (mild)    Recurrent major depressive disorder, in full remission (H24)    Stress-induced cardiomyopathy    Encounter for screening mammogram for breast cancer    Screen for colon cancer    Asymptomatic postmenopausal status    Urinary tract infection associated with catheterization of urinary tract, unspecified indwelling urinary catheter type, initial encounter     Sepsis, due to  "unspecified organism, unspecified whether acute organ dysfunction present (H)        History   Drug Use    Types: Marijuana     Comment: Medical canibis, methadone for chronic back pain         Tobacco Use      Smoking status: Never      Smokeless tobacco: Never         cefTRIAXone  2 g Intravenous Q24H    DULoxetine  120 mg Oral QAM    fluticasone-vilanterol  1 puff Inhalation Daily    gabapentin  1,600 mg Oral TID    insulin aspart  1-7 Units Subcutaneous TID AC    insulin aspart  1-5 Units Subcutaneous At Bedtime    levothyroxine  88 mcg Oral Daily    methadone  5 mg Oral TID    miconazole   Topical BID    oxyBUTYnin ER  15 mg Oral Daily    phenazopyridine  100 mg Oral TID w/meals    rosuvastatin  20 mg Oral Daily    senna-docusate  2 tablet Oral BID    sodium chloride (PF)  3 mL Intracatheter Q8H    traZODone  150 mg Oral At Bedtime       Objective:  Vital signs in last 24 hours:  B/P: 107/52, T: 98, P: 56, R: 18   Blood pressure 107/52, pulse 56, temperature 98  F (36.7  C), temperature source Oral, resp. rate 18, height 1.651 m (5' 5\"), weight 94.9 kg (209 lb 3.5 oz), SpO2 95%, not currently breastfeeding.      Weight:   Wt Readings from Last 2 Encounters:   11/01/23 94.9 kg (209 lb 3.5 oz)   11/18/22 109.7 kg (241 lb 13.5 oz)           Intake/Output:    Intake/Output Summary (Last 24 hours) at 11/1/2023 1002  Last data filed at 11/1/2023 0647  Gross per 24 hour   Intake 2715 ml   Output 3925 ml   Net -1210 ml        Review of Systems:   As per subjective, all others negative.    Physical Exam:     General Appearance:  Alert, cooperative, no distress. Patient is pleasant and able to make needs known   Head:  Normocephalic, without obvious abnormality, atraumatic   Eyes:   Conjunctiva/corneas clear, EOM's intact   ENT/Throat: Lips, mouth  moist    Lymph/Neck: Symmetrical, trachea midline, no adenopathy, thyroid: not enlarged, symmetric    Lungs:   Clear to auscultation bilaterally, respirations unlabored, even " chest rise. Symmetrical movement    Chest Wall:  No tenderness or deformity   Cardiovascular/Heart:  Regular rate and rhythm, S1, S2 normal,no murmur, rub or gallop.     Abdomen:   Soft, non-tender, bowel sounds active all four quadrants,  no masses, no organomegaly. Presence of suprapubic cath   Musculoskeletal: Extremities normal, atraumatic  Incision none    Skin: Skin color good, lesions none    Neurologic: Alert and oriented X 3, Moves all 4 extremities       Psych: Affect is  normal  Grooming is  good           Imaging:  Personally Reviewed.        Results for orders placed or performed during the hospital encounter of 10/29/23   CT Abdomen Pelvis w Contrast    Impression    IMPRESSION:   1.  4 mm stone at ureteropelvic junction on the left with minimal if any proximal hydronephrosis.  2.  No pyelonephritis demonstrated by CT although mild pyelonephritis can be occult.  3.  Otherwise, stable exam since comparison.          Lab Results:  Personally Reviewed.   Last Comprehensive Metabolic Panel:  Sodium   Date Value Ref Range Status   10/31/2023 139 135 - 145 mmol/L Final     Comment:     Reference intervals for this test were updated on 09/26/2023 to more accurately reflect our healthy population. There may be differences in the flagging of prior results with similar values performed with this method. Interpretation of those prior results can be made in the context of the updated reference intervals.    03/19/2021 135 133 - 144 mmol/L Final     Potassium   Date Value Ref Range Status   10/31/2023 4.4 3.4 - 5.3 mmol/L Final   06/30/2022 4.6 3.4 - 5.3 mmol/L Final   03/19/2021 4.4 3.4 - 5.3 mmol/L Final     Chloride   Date Value Ref Range Status   10/31/2023 104 98 - 107 mmol/L Final   06/30/2022 101 94 - 109 mmol/L Final   03/19/2021 101 94 - 109 mmol/L Final     Carbon Dioxide   Date Value Ref Range Status   03/19/2021 33 (H) 20 - 32 mmol/L Final     Carbon Dioxide (CO2)   Date Value Ref Range Status    10/31/2023 27 22 - 29 mmol/L Final   06/30/2022 29 20 - 32 mmol/L Final     Anion Gap   Date Value Ref Range Status   10/31/2023 8 7 - 15 mmol/L Final   06/30/2022 6 3 - 14 mmol/L Final   03/19/2021 1 (L) 3 - 14 mmol/L Final     Glucose   Date Value Ref Range Status   06/30/2022 96 70 - 99 mg/dL Final   03/19/2021 96 70 - 99 mg/dL Final     Comment:     Non Fasting     GLUCOSE BY METER POCT   Date Value Ref Range Status   11/01/2023 90 70 - 99 mg/dL Final     Urea Nitrogen   Date Value Ref Range Status   10/31/2023 16.5 8.0 - 23.0 mg/dL Final   06/30/2022 29 7 - 30 mg/dL Final   03/19/2021 18 7 - 30 mg/dL Final     Creatinine   Date Value Ref Range Status   10/31/2023 0.86 0.51 - 0.95 mg/dL Final   03/19/2021 0.87 0.52 - 1.04 mg/dL Final     GFR Estimate   Date Value Ref Range Status   10/31/2023 74 >60 mL/min/1.73m2 Final   03/19/2021 69 >60 mL/min/[1.73_m2] Final     Comment:     Starting 12/18/2018, serum creatinine based estimated GFR (eGFR) will be   calculated using the Chronic Kidney Disease Epidemiology Collaboration   (CKD-EPI) equation.       GFR, ESTIMATED POCT   Date Value Ref Range Status   10/23/2023 50 (L) >60 mL/min/1.73m2 Final     Calcium   Date Value Ref Range Status   10/31/2023 9.7 8.8 - 10.2 mg/dL Final   03/19/2021 10.0 8.5 - 10.1 mg/dL Final        UA:   Amphetamine Qual Urine   Date Value Ref Range Status   12/29/2019 Negative NEG^Negative Final     Comment:     Cutoff for a negative amphetamine is 500 ng/mL or less.     Barbiturates Qual Urine   Date Value Ref Range Status   12/29/2019 Negative NEG^Negative Final     Comment:     Cutoff for a negative barbiturate is 200 ng/mL or less.     Benzodiazepine Qual Urine   Date Value Ref Range Status   12/29/2019 Negative NEG^Negative Final     Comment:     Cutoff for a negative benzodiazepine is 200 ng/mL or less.     Cannabinoids Qual Urine   Date Value Ref Range Status   12/29/2019 Positive (A) NEG^Negative Final     Comment:     Cutoff for a  positive cannabinoid is greater than 50 ng/mL. This is an   unconfirmed screening result to be used for medical purposes only.       Cocaine Qual Urine   Date Value Ref Range Status   12/29/2019 Negative NEG^Negative Final     Comment:     Cutoff for a negative cocaine is 300 ng/mL or less.     Opiates Qualitative Urine   Date Value Ref Range Status   12/29/2019 Negative NEG^Negative Final     Comment:     Cutoff for a negative opiate is 300 ng/mL or less.     PCP Qual Urine   Date Value Ref Range Status   12/29/2019 Negative NEG^Negative Final     Comment:     Cutoff for a negative PCP is 25 ng/mL or less.              Please see A&P for additional details of medical decision making.  MANAGEMENT DISCUSSED with the following over the past 24 hours: THANG Hines MD    NOTE(S)/MEDICAL RECORDS REVIEWED over the past 24 hours: yes   Tests personally interpreted in the past 24 hours:  - EKG tracing showing normal QT-c  - ABDOMINAL XRAY showing 4 mm Urethral stone w/o hydronephorosis   Tests ORDERED & REVIEWED in the past 24 hours:  - BMP  - CBC  SUPPLEMENTAL HISTORY, in addition to the patient's history, over the past 24 hours obtained from:   - none   Medical complexity over the past 24 hours:  - Prescription DRUG MANAGEMENT performed  25 MINUTES SPENT BY ME on the date of service doing chart review, history, exam, documentation & further activities per the note.      Anabelle Reilly, ELISSA CNP, PGMT-BC, ACHPN  [facility]   Coverage Monday-Friday 8:00-4:30   No weekend coverage contact house officer  AMCOM Paging/Directory Pain  Securely message with the Vocera Web Console (learn more here)

## 2023-11-01 NOTE — PLAN OF CARE
Discharge    Patient discharged to home via wheelchair with  Health transportation     Care plan note  Summary: UTI   DATE & TIME: 11/01/23 8930-0823  Cognitive Concerns/ Orientation: A & O x 4   BEHAVIOR & AGGRESSION TOOL COLOR: Green  ABNL VS/O2: VSS on RA   MOBILITY: A1 to pivot. Wheelchair bound at baseline d/t spinal stenosis, pt can weight shift, moves well independently in bed. Refused repositioning.   PAIN MANAGMENT: Pt still complaining of severe bladder spasms, severe pain to urethra. Mildly better today. PRN Norco, Baclofen x2, and Lidocaine cream x 1.   DIET: Regular/Mod Carb  BOWEL/BLADDER: Continent of Bowel. Suprapubic cath in place. No BM   ABNL LAB/BG: [facility]  DRAIN/DEVICES: Suprapubic cath in place for neurogenic baldder, orange/yellow output. PIV SL and removed for discharge.   SKIN: Scattered Abrasions, Bruising; dry and cracked skin on heels; redness in pubic region; Suprapubic catheter; previous surgical scars.   TESTS/PROCEDURES: L Ureteral stent placement done 10/30/23  D/C DAY/GOALS/PLACE: Back home this afternoon   OTHER IMPORTANT INFO: Contact precautions maintained.     Listed belongings gathered and given to patient (including from security/pharmacy). Yes  Care Plan and Patient education resolved: Yes  Prescriptions if needed, hard copies sent with patient  NA  Medication Bin checked and emptied on discharge Yes  SW/care coordinator/charge RN aware of discharge: Yes

## 2023-11-02 NOTE — PROGRESS NOTES
Clinic Care Coordination Contact  [facility]/Voicemail    Clinical Data: Care Coordinator Outreach    Outreach Documentation Number of Outreach Attempt   11/2/2023   1:22 PM 1       Left message on patient's voicemail with call back information and requested return call.    Plan: Care Coordinator  via . Care Coordinator will try to reach patient again in 1-2 business days.    SHIMON Ferrer   Care Coordination Team  [phone]

## 2023-11-03 NOTE — TELEPHONE ENCOUNTER
Pt scheduled w Dr Henry 11/13 virtual visit. Pt requested to see Dr Henry only.  Loren Coffey, CMA

## 2023-11-06 NOTE — PROGRESS NOTES
Clinic Care Coordination Contact  [facility]: Post-Discharge Note  SITUATION                                                      Admission:    Admission Date: 10/29/23   Reason for Admission: neurogenic bladder with chronic indwelling catheter with frequent UTIs who presented to [facility] with concerns of urinary tract infection.  Discharge:   Discharge Date: 11/02/23  Discharge Diagnosis: History of recurrent UTI  Renal stone  Neurogenic bladder with chronic indwelling suprapubic catheter  Hypercalcemia, improved prior to discharge  ROLANDO, resolved prior to discharge  Chronic pain syndrome    BACKGROUND                                                      Per hospital discharge summary and inpatient provider notes:  Past medical history spinal stenosis, asthma, DMII, hypertension, hypothyroid, morbid obesity, sleep apnea non-compliant with CPAP, hepatic cirrhosis, chronic pain syndrome, anxiety/depression and neurogenic bladder with chronic indwelling catheter with frequent UTIs who presented to [facility] with concerns of urinary tract infection.        ASSESSMENT           Discharge Assessment  How are you doing now that you are home?: Having pain and spasms.  Has Urology appt today  Do you feel your condition is stable enough to be safe at home until your provider visit?: Yes  Does the patient have their discharge instructions? : Yes  Does the patient have questions regarding their discharge instructions? : Yes (see comment)  Does the patient have all of their medications?: Yes  Do you have questions regarding any of your medications? : No  Do you have all of your needed medical supplies or equipment (DME)?  (i.e. oxygen tank, CPAP, cane, etc.): Yes  Discharge follow-up appointment scheduled within 14 calendar days? : Yes  Discharge Follow Up Appointment Date: 11/06/23  Discharge Follow Up Appointment Scheduled with?: Specialty Care Provider         Declines Care Coordination at this time         PLAN                                                       Outpatient Plan:  Has Urology appt 11/6/23    Future Appointments   Date Time Provider Department Center   11/13/2023 11:30 AM Hailee Henry MD [facility]    11/27/2023  2:15 PM Horacio Escobedo MD [facility]   11/30/2023  2:15 PM Walt Venegas MD [facility]   12/7/2023  3:15 PM Ant Paula MD [facility]   12/12/2023  2:30 PM Hailee Henry MD [facility]         For any urgent concerns, please contact our 24 hour nurse triage line: [phone] ([phone])         SHIMON Santizo

## 2023-11-06 NOTE — TELEPHONE ENCOUNTER
"Pt reports \"haven't had a bowel movement in a week\". Pt reports she also has a kidney stone which \"has not passed\". Pt reports \"excruciating pain in urethra\". Pt reports \"they sent me home with three pain pills\". Pt states \"there is a suppository with belladonna and a narcotic, contacted pain management and they said I have to fill out a form\". Pt reports \"for two days I took four Dulcolax tabs and today took six at 11 am, no results\". Pt rates her urethral spasms \"8-10\". At time of call pain is \"10\". Pt reports she is sweating and shaking. Pt reports she has a supra pubic catheter and \"changed in hospital\". Pt is tearful and sounds very uncomfortable to Writer.     Writer advised pt to be seen in the ER now per protocol.     Pt verbalizes understanding of advice, states she is reluctant to go but does agree to plan.       Reason for Disposition   [1] SEVERE pain (e.g., excruciating, scale 8-10) AND [2] not improved after pain medicine    Additional Information   Negative: Shock suspected (e.g., cold/pale/clammy skin, too weak to stand, low BP, rapid pulse)   Negative: Sounds like a life-threatening emergency to the triager   Negative: [1] Back pain AND [2] NOT recently diagnosed with a kidney stone   Negative: [1] Flank pain (i.e., pain in the side, over the lower ribs or just below the ribs) AND [2] NOT recently diagnosed with a kidney stone   Negative: [1] Unable to urinate (or only a few drops) > 4 hours AND [2] bladder feels very full (e.g., palpable bladder or strong urge to urinate)    Protocols used: Kidney Stone Follow-up Call-A-AH    "

## 2023-11-07 NOTE — PROGRESS NOTES
RNCC called pt back from her [employer] message routed to the pool.  Called pt to discuss sx and she reports today pain is about 5/10.  Less than previously and improved from yesterday.  Pt reporting not able to have a bowel movement and is constipated.  She has taken 8 dulcolax in the last 24 hours and not much improvement.  Reports small amount of hard stool last night but nothing else.  Writer discussed pain management with Tylenol and ibuprofen rotate every 6-8 hours and also to get another form of stool softener like powder or chews.  Discussed with her dietary ways she can help constipation as well like peaches, pears, prunes and prune juice.  Writer advised pt that she will have stent discomfort until they are removed and advised that we are working on a plan for her and will reach out once we know more.  Pt verbalized understanding and agrees to plan and when to go to ED for emergent symptoms.  She will call if she has questions or concerns    QAM[facility]

## 2023-11-08 NOTE — PROGRESS NOTES
"Bhavani is a 67 year old who is being evaluated via a billable video visit.      How would you like to obtain your AVS? MyChart  If the video visit is dropped, the invitation should be resent by: Text to cell phone: [phone]  Will anyone else be joining your video visit? No      Assessment & Plan     Type 2 diabetes mellitus without complication, without long-term current use of insulin (H)  A1c within normal range.   - CBC with Platelets (Today); Future  - Basic metabolic panel; Future    Screen for colon cancer  - Colonoscopy Screening  Referral; Future    Neurogenic bladder, S/P Suprapubic Catheter   Left ureteral stone  Urology will be calling her to schedule an appointment.   She has a left ureteral stent.   - CBC with Platelets (Today); Future  - Basic metabolic panel; Future       MED REC REQUIRED  Post Medication Reconciliation Status:  Discharge medications reconciled, continue medications without change  Discharge medications reconciled and changed, see notes/orders  BMI:   Estimated body mass index is 34.82 kg/m  as calculated from the following:    Height as of 10/29/23: 1.651 m (5' 5\").    Weight as of 11/1/23: 94.9 kg (209 lb 3.5 oz).       See Patient Instructions    Hailee Henry MD  [facility] PREETHI Beckham is a 67 year old, presenting for the following health issues:  Hospital F/U      HPI     She was admitted for a UTI and 4 mm stone at the ureteropelvic junction with minimal if any proximal hydronephrosis , she had left ureteral stent placement   She took semaglutide 0.25 for 1 week and increased the dose every week instead of every 4 weeks. Currently she is on 0.5 mg and asked her to continue this dose for 4 weeks and then increase to 1 mg.   Urethral spasm and bladder spasm, she reports oxybutynin, baclofen or pyridium doesn't help.        11/6/2023    10:23 AM   Post Discharge Outreach   Admission Date 10/29/2023   Reason for Admission neurogenic bladder " with chronic indwelling catheter with frequent UTIs who presented to [facility] with concerns of urinary tract infection.   Discharge Date 11/2/2023   Discharge Diagnosis History of recurrent UTI  Renal stone  Neurogenic bladder with chronic indwelling suprapubic catheter  Hypercalcemia, improved prior to discharge  ROLANDO, resolved prior to discharge  Chronic pain syndrome   How are you doing now that you are home? Having pain and spasms.  Has Urology appt today   Do you feel your condition is stable enough to be safe at home until your provider visit? Yes   Does the patient have their discharge instructions?  Yes   Does the patient have questions regarding their discharge instructions?  Yes (see comment)   Does the patient have all of their medications? Yes   Do you have questions regarding any of your medications?  No   Do you have all of your needed medical supplies or equipment (DME)?  (i.e. oxygen tank, CPAP, cane, etc.) Yes   Discharge follow-up appointment scheduled within 14 calendar days?  Yes   Discharge Follow Up Appointment Date 11/6/2023   Discharge Follow Up Appointment Scheduled with? Specialty Care Provider     Hospital Follow-up Visit:    Hospital/Nursing Home/ Rehab Facility: [facility]  Date of Admission: 10/29/2023  Date of Discharge: 11/01/2023  Reason(s) for Admission: Sepsis, due to unspecified organism, unspecified whether acute organ dysfunction present (H)     Was your hospitalization related to COVID-19? No   Problems taking medications regularly:  None  Medication changes since discharge:   Started taking:  -HYDROcodone-acetaminophen (NORCO) 5-325 MG tablet   -lidocaine (XYLOCAINE) 5 % external ointment   -phenazopyridine (PYRIDIUM) 100 MG tablet   Problems adhering to non-medication therapy:  None    Summary of hospitalization:  [facility] discharge summary reviewed  Diagnostic Tests/Treatments reviewed.  Follow up needed: none  Other Healthcare  Providers Involved in Patient s Care:         None  Update since discharge: improved    Plan of care communicated with patient      Review of Systems       Objective       Vitals:  No vitals were obtained today due to virtual visit.    Physical Exam   GEN: No acute distress  RESP: No audible increased work of breathing. Patient speaking in full sentences without distress.  PSYCH: pleasant  Exam otherwise limited due to virtual platform          Video-Visit Details    Type of service:  Video Visit   Video Start Time:  11: 45 am  Video End Time:12:01 PM  Originating Location (pt. Location): Home  Distant Location (provider location):  On-site  Platform used for Video Visit: Profig      Answers submitted by the patient for this visit:  Patient Health Questionnaire (Submitted on 11/12/2023)  If you checked off any problems, how difficult have these problems made it for you to do your work, take care of things at home, or get along with other people?: Very difficult  PHQ9 TOTAL SCORE: 15

## 2023-11-08 NOTE — PROGRESS NOTES
Type of Form Received: Order (leg strap, catheter, drainage bag, leg bag)    Form Received (Date) 11/8/23   Form Filled out Yes, date 11/8/23   Placed in provider folder Yes       Received Completed forms Yes   Faxed Forms Faxed To: [facility]  Fax Number: [phone]   Sent to HIM (Date) 11/13/23

## 2023-11-13 PROBLEM — N20.1 LEFT URETERAL STONE: Status: ACTIVE | Noted: 2023-01-01

## 2023-11-16 NOTE — PROGRESS NOTES
Called and spoke to Dr. Rios.  States she was in the hospital.  Stents were placed by urology when she was admitted.  She also stated she needs botox which she does in the OR.  Will reach out to Dr. Rios for botox order and plan for stent removal.  Will notify patient when we obtain orders.     Karoline Paige RN   4:02 PM

## 2023-11-16 NOTE — PROGRESS NOTES
Writer called patient regarding her stent pain and symptoms.  She reports having hot/cold sensation with the pain from stent as well as an episode of vomiting with the pain.  Then it will settle down and resolve after a period of time.  Assured the pt we are working on rescheduling her botox and stent removal.  There was a prior cancel by the pt for this due to wanting in OR under anesthesia and both same time. Reviewed pain management with the patient as well as symptoms to report to the ER for.  PT is doing OTC tylenol, ibuprofen as well as taking her 5mg Methadone and the ditropan.  Nothing is helping per patient.  Advised patient that the OTC and what has already been written for or suggested is likely all they can give her until the stent is removed.  Pt verbalized Dilaudid worked in the hospital but writer advised unsure if that is something the provider would write since it is narcotic. Pt verbalized understanding and agrees to the plan.    THANG [facility]

## 2023-11-17 NOTE — TELEPHONE ENCOUNTER
I called the patient in regards to scheduling surgery with Dr. Rios. Patient pre op has been taken care of by completed while inpatient 10/31. Patient is aware that the nursing team will be reaching out within the next few days.     Date of Surgery: 11/22/23  Location of surgery: [facility]  Pre-Op H&P: Completed while inpatient  Post-Op Appt Date: SARAH Jacob on 11/17/2023 at 10:20 AM

## 2023-11-20 NOTE — TELEPHONE ENCOUNTER
Home Care is calling regarding an established patient with [facility].        7/3/2023     8:16 AM   Home Care Information   Date of Home Care episode start 2/3/2023   Current following provider Carl Stephen   Date provider agreed to follow 2/3/2023    Name/Phone Number THANG Jeffrey   Home Care agency [facility].     Requesting orders from: Hailee Henry  Provider is following patient: No       Orders Requested    Skilled Nursing  Request for initial evaluation and treatment (one time) (first set of orders)   Post-hospital from kidney stone finding    Information was gathered and will be sent to provider for review.  RN will contact Home Care with information after provider review.  Confirmed ok to leave a detailed message with call back.  Contact information confirmed and updated as needed.    Helen West RN

## 2023-11-20 NOTE — TELEPHONE ENCOUNTER
Writer called to [facility] to determine how to pend order(s). Writer was advised to speak with Service Department at [facility]. Writer was advised by Service Department that order to sign was faxed to PCP's office, writer attempted to confirm that the correct fax number was used, but Service Department unable to see which fax number was used.    Service Department at [facility] sent a message to Enma at [facility] to check if correct fax number was used. Enma to refax and reach back out to PCP's office with any questions or if faxes were sent and not returned by PCP's office.    Lisa Kaur, RN  -[facility]

## 2023-11-21 NOTE — TELEPHONE ENCOUNTER
Writer called and left detailed message for Afia notifying of PCP's approval for requested home care orders.    Advised that home care callback if anything further is needed.    Signing encounter.    Duran Martinez RN  [facility]

## 2023-11-21 NOTE — TELEPHONE ENCOUNTER
Talked to Bhavani regarding surgery.  Discussed check-in time of 12pm.      Pre Op Teaching Flowsheet       Pre and Post op Patient Education  Relevant Diagnosis:  Urinary Incontinence   Surgical procedure:  Cystoscopy with bladder botox   Teaching Topic:  Pre and post op teaching  Person Involved in teaching: Yes    Motivation Level:  Asks Questions: Yes  Eager to Learn: Yes  Cooperative: Yes  Receptive (willing/able to accept information):  Yes    Patient demonstrates understanding of the following:  Date of surgery:  11/22/23  Location of surgery:  [facility] - 5th Floor  History and Physical and any other testing necessary prior to surgery: Yes  Required time line for completion of History and Physical and any pre-op testing: Yes    Patient demonstrates understanding of the following:  Pre-op bowel prep:  N/A  Pre-op showering/scrub information with PCMX Soap: Yes  Blood thinner medications discussed and when to stop (if applicable):  N/A  Discussed no visitor's at this time due to increase Covid-19 cases and how we need to make sure everyone stays safe.    Infection Prevention:   Patient demonstrates understanding of the following:  Surgical procedure site care taught: Yes  Signs and symptoms of infection taught: Yes      Post-op follow-up:  Discussed how to contact the hospital, nurse, and clinic scheduling staff if necessary. (See packet information)    Instructional materials used/given/mailed:  [facility] Packet, post op teaching sheet, Map, Soap, and with the arrival/location information to come closer to the surgery date.    Surgical instructions packet given to patient in office:  N/A    Follow up: Discussed arranging for someone to drive you home. ( No public transportation)  Someone needed to stay the first twenty hours after surgery: Yes     referral: No      home:  [employer]     Care Giver:  Spouse is in intermediate care,  but they do have family that can be with her.     PCP:  MD Karoline Collins RN   11:38 AM

## 2023-11-22 NOTE — PROVIDER NOTIFICATION
Notified Dr. Lisa , urology ([id number])and Dr. Parker regarding pt not having responsible adult with her after procedure. Pt has [employer].  Sister has not picked up phone calls and pt stated that her sister is out of town

## 2023-11-22 NOTE — ANESTHESIA PREPROCEDURE EVALUATION
Anesthesia Pre-Procedure Evaluation    Patient: Bhavani White   MRN: [id number] : 1955        Procedure : Procedure(s):  CYSTOSCOPY, WITH BOTULINUM TOXIN INJECTION  CYSTOURETEROSCOPY, WITH RETROGRADE PYELOGRAM, HOLMIUM LASER LITHOTRIPSY OF URETERAL CALCULUS, AND STENT INSERTION          Past Medical History:   Diagnosis Date    Abdominal wall cellulitis 2020    Abdominopelvic abscess (H) 2020    Anxiety     Asthma     Depression     DM (diabetes mellitus) (H)     with peripheral neuropathy    Fracture of lower extremity 2013    Frequent UTI     History of blood transfusion     History of ulcer disease 2014    She notes from NSAIDs; nearly . Discussed a hospitalization at [facility] for this.    Hypertension     Hypothyroid     Iatrogenic Cushing's disease     off prednisone now    Infection due to alpha-hemolytic Streptococcus 2020    Morbid obesity (H)     MRSA (methicillin resistant staph aureus) culture positive 2012    repeat cx 10/12/2012 no staph mentioned.    ANIYA -- noncompliant with CPAP     Osteoarthritis     multiple joings.    Other chronic pain     Perforated duodenal ulcer -- S/P Surgery 2019    SBO (small bowel obstruction) (H) 2020    Sepsis (WBC 20K, ) 2020    Sleep apnea     No longer uses cpap.      Past Surgical History:   Procedure Laterality Date    ABDOMEN SURGERY      APPLY WOUND VAC  2019    Procedure: Apply Wound Vac;  Surgeon: Ayan Lopez MD;  Location:  OR    CLOSE SECONDARY WOUND ABDOMEN N/A 2020    Procedure: SECONDARY ABDOMINAL WOUND CLOSURE;  Surgeon: Ayan Lopez MD;  Location:  OR    COMBINED CYSTOSCOPY, RETROGRADES, EXCHANGE STENT URETER(S) Left 10/30/2023    Procedure: Combined cystoscopy, left retrogrades, left stent placement;  Surgeon: Steven Shen MD;  Location:  OR    CYSTOSCOPY N/A 2018    Procedure: CYSTOSCOPY;  Cystoscopy and Botox Injection Into  the Bladder and suprapubic tube exchange;  Surgeon: Yoandy Rios MD;  [facility]    CYSTOSCOPY N/A 3/15/2019    Procedure: Cystoscopy, suprapubic tube exchange;  Surgeon: Yoandy Rios MD;  [facility]    CYSTOSCOPY FLEXIBLE, CYSTOSTOMY, INSERT TUBE SUPRAPUBIC, COMBINED N/A 11/1/2019    Procedure: Cystoscopy, Bladder Botox Injection, Suprapubic Tube Change;  Surgeon: Yoandy Riso MD;  [facility]    CYSTOSCOPY, INJECT BOTOX N/A 11/18/2022    Procedure: CYSTOSCOPY, WITH BOTULINUM TOXIN INJECTION;  Surgeon: Yoandy Rios MD;  [facility]    CYSTOSCOPY, INTRAVESICAL INJECTION N/A 8/23/2017    Procedure: CYSTOSCOPY, INTRAVESICAL INJECTION;  Cystoscopy, Botox Injection Into The Bladder  Latex Allergy ;  Surgeon: Yoandy Rios MD;  [facility]    CYSTOSCOPY, INTRAVESICAL INJECTION N/A 3/8/2018    Procedure: CYSTOSCOPY, INTRAVESICAL INJECTION;  Cystoscopy, Botox Injection Into The Bladder and suprapubic catheter exchange;  Surgeon: Yoandy Rios MD;  [facility]    DISCECTOMY, FUSION CERVICAL ANTERIOR THREE+ LEVELS, COMBINED Left 5/3/2016    Procedure: COMBINED DISCECTOMY, FUSION CERVICAL ANTERIOR THREE+ LEVELS;  Surgeon: Jasvir Torres MD;  [facility]    ENTEROSCOPY SMALL BOWEL N/A 6/8/2020    Procedure: Enteroscopy small bowel;  Surgeon: Gabe Forte MD;  Location:  GI    GASTROSTOMY, INSERT TUBE, COMBINED N/A 1/1/2020    Procedure: GASTRIC-JEJUNAL FEEDING TUBE INSERTION;  Surgeon: Ayan Lopez MD;  Location:  OR    GENITOURINARY SURGERY      suprapubic catheter    HERNIA REPAIR  1957    double hernia    HYSTERECTOMY, PAP NO LONGER INDICATED      CELIA and large ovarian tumor removed    INJECT BOTOX N/A 9/21/2018    Procedure: INJECT BOTOX;;  Surgeon: Yoandy Rios MD;  [facility]    INJECT BOTOX N/A 3/15/2019    Procedure: Inject Botox into Bladder;  Surgeon: Yoandy Rios MD;   "[facility]    IR GASTRO JEJUNOSTOMY TUBE PLACEMENT  6/11/2020    IR GASTROSTOMY TUBE CHANGE  6/30/2020    IR JEJUNOSTOMY TUBE CHANGE  2/4/2020    IR JEJUNOSTOMY TUBE CHANGE  2/19/2020    LAPAROSCOPY DIAGNOSTIC (GENERAL) N/A 12/30/2019    Procedure: Laparoscopy diagnostic (general);  Surgeon: Ayan Lopez MD;  Location: [facility] OR    LAPAROTOMY EXPLORATORY N/A 1/1/2020    Procedure: EXPLORATORY LAPAROTOMY;  Surgeon: Ayan Lopez MD;  Location: [facility]    LAPAROTOMY EXPLORATORY N/A 12/30/2019    Procedure: LAPAROTOMY, REPAIR OF ULCER PERFORATION;  Surgeon: Ayan Lopez MD;  Location: [facility]    SURGICAL HISTORY OF -       left cataract surgery    SURGICAL HISTORY OF -   12/14    right cataract surgery and left laser revision    SURGICAL HISTORY OF -   March 2015    left carpal tunnel release    TONSILLECTOMY  1974    ZZC LAMINECTOMY,FACETECTOMY,LUMBAR  1982    ZZC TOTAL KNEE ARTHROPLASTY  1999    left      Allergies   Allergen Reactions    Povidone Iodine      \"mild skin irritation\" per patient report    Toradol [Ketorolac] Other (See Comments)     Pt gets nightmares    Tramadol Other (See Comments)      Social History     Tobacco Use    Smoking status: Never    Smokeless tobacco: Never   Substance Use Topics    Alcohol use: No      Wt Readings from Last 1 Encounters:   11/22/23 (P) 92 kg (202 lb 13.2 oz)        Anesthesia Evaluation   Pt has had prior anesthetic.     History of anesthetic complications       ROS/MED HX  ENT/Pulmonary:     (+) sleep apnea,                    asthma                  Neurologic:       Cardiovascular:     (+)  - -   -  - -      CHF                                METS/Exercise Tolerance:     Hematologic:       Musculoskeletal:       GI/Hepatic:       Renal/Genitourinary:     (+) renal disease,             Endo:     (+) type I DM,         thyroid problem,     Obesity,       Psychiatric/Substance Use:       Infectious Disease:       Malignancy:       Other:      (+)  " , H/O Chronic Pain,         Physical Exam    Airway        Mallampati: II    Neck ROM: full     Respiratory Devices and Support         Dental       (+) Minor Abnormalities - some fillings, tiny chips      Cardiovascular   cardiovascular exam normal          Pulmonary   pulmonary exam normal                OUTSIDE LABS:  CBC:   Lab Results   Component Value Date    WBC 9.2 10/31/2023    WBC 9.4 10/30/2023    HGB 12.6 10/31/2023    HGB 13.7 10/30/2023    HCT 40.9 10/31/2023    HCT 43.1 10/30/2023     10/31/2023     10/30/2023     BMP:   Lab Results   Component Value Date     10/31/2023     (L) 10/30/2023    POTASSIUM 4.4 10/31/2023    POTASSIUM 4.2 10/30/2023    CHLORIDE 104 10/31/2023    CHLORIDE 96 (L) 10/30/2023    CO2 27 10/31/2023    CO2 29 10/30/2023    BUN 16.5 10/31/2023    BUN 22.7 10/30/2023    CR 0.86 10/31/2023    CR 1.05 (H) 10/30/2023     (H) 11/22/2023    GLC 90 11/01/2023     COAGS:   Lab Results   Component Value Date    PTT 26 10/23/2023    INR 0.99 10/23/2023    FIBR 576 (H) 01/19/2009     POC:   Lab Results   Component Value Date     (H) 06/16/2020     HEPATIC:   Lab Results   Component Value Date    ALBUMIN 4.5 10/29/2023    PROTTOTAL 8.3 10/29/2023    ALT 42 10/29/2023    AST 32 10/29/2023    ALKPHOS 105 (H) 10/29/2023    BILITOTAL 0.6 10/29/2023     OTHER:   Lab Results   Component Value Date    PH 7.35 01/18/2020    LACT 1.7 10/29/2023    A1C 6.2 (H) 08/08/2023    DORY 9.7 10/31/2023    PHOS 3.3 06/15/2020    MAG 1.9 06/15/2020    LIPASE 34 (L) 06/03/2020    AMYLASE 44 01/08/2009    TSH 0.40 08/08/2023    T4 1.03 08/15/2018    CRP 30.0 (H) 05/16/2016    SED 11 03/04/2010       Anesthesia Plan    ASA Status:  3       Anesthesia Type: General.     - Airway: ETT              Consents    Anesthesia Plan(s) and associated risks, benefits, and realistic alternatives discussed. Questions answered and patient/representative(s) expressed understanding.     -  Discussed: Risks, Benefits and Alternatives for the PROCEDURE were discussed     - Discussed with:  Patient      - Extended Intubation/Ventilatory Support Discussed: No.      - Patient is DNR/DNI Status: No     Use of blood products discussed: No .     Postoperative Care    Pain management: Multi-modal analgesia.   PONV prophylaxis: Ondansetron (or other 5HT-3), Dexamethasone or Solumedrol     Comments:                Cat Parker MD

## 2023-11-22 NOTE — PLAN OF CARE
Pt confirmed that she does not have responsible adult to stay with her for 24 hrs post procedure. Dr. Lisa aware and procedure cancelled. Pt also reported that she was not feeling well and wanted apple juice.  Per pt, she has been having chills for last 3 days.    Pt wanting to take [employer] home instead of going to ED.

## 2023-11-29 NOTE — TELEPHONE ENCOUNTER
This refill request is a duplicate request, previously received or sent.   Sent denial notification to pharmacy.  THANG Rosenbaum  [facility]

## 2023-12-05 NOTE — PROGRESS NOTES
Type of Form Received: Order (extension drainage tubing)    Form Received (Date) 12/5/23   Form Filled out Yes, date 12/5/23   Placed in provider folder Yes       Received Completed forms Yes   Faxed Forms Faxed To: [facility]  Fax Number: [phone]   Sent to HIM (Date) 12/8/23

## 2023-12-15 PROBLEM — N20.1 URETERAL STONE: Status: ACTIVE | Noted: 2023-01-01

## 2023-12-15 NOTE — TELEPHONE ENCOUNTER
Patient is scheduled for a surgical procedure with Dr. Nelson      Spoke with: Patient via phone      Date of Surgery/Procedure: Friday January 05, 2024    Location: [facility] OR      Informed patient they will need an adult : Yes     Pre-op: Yes      H&P: Patient to schedule    Additional imaging/appointments: [facility] prior to post op     Post-op: Monday February 19, 2024     Additional comments:      Surgery packet: [facility]     Patient is aware that surgery time is tentative to change and to expect a call 3-1 business days from Pre Admission Nursing for instructions and arrival time

## 2023-12-26 NOTE — TELEPHONE ENCOUNTER
FUTURE VISIT INFORMATION      SURGERY INFORMATION:  Date: 1/5/24  [facility]  Surgeon:  Karel Nelson MD Elliott, Sean Patrick, MD   Anesthesia Type:  general  Procedure: CYSTOSCOPY, WITH BOTULINUM TOXIN INJECTION   CYSTOURETEROSCOPY, WITH RETROGRADE PYELOGRAM, HOLMIUM LASER LITHOTRIPSY OF URETERAL CALCULUS, AND STENT INSERTION       RECORDS REQUESTED FROM:       Primary Care Provider: Hailee Henry MD - [facility]    Pertinent Medical History: hypertension, NSTEMI, Chronic diastolic heart failure, stress-induced cardiomyopathy     Most recent EKG+ Tracing: 10/29/23    Most recent ECHO: 10/10/22    Most recent Cardiac Stress Test: 5/5/20

## 2023-12-27 NOTE — PATIENT INSTRUCTIONS
Preparing for Your Surgery      Name:  Bhavani White   MRN:  [id number]   :  1955   Today's Date:  2023       Arriving for surgery:  Surgery date:  24  Arrival time:  11:15 am      Please come to:     [facility] [address] [address]     Parking is available in front of the Clinics and Surgery Center building from 5:30AM to 8:00PM.  -  Proceed to the 5th floor to check into the Ambulatory Surgery Center.              >> There will be patient concierges on the 1st and 5th floor, for assistance or an escort, if you would like.              >> Please call [phone] with any questions.    What can I eat or drink?  -  You may eat and drink normally up to 8 hours prior to arrival time.  -  You may have clear liquids until 2 hours prior to arrival time.     Examples of clear liquids:  Water  Clear broth  Juices (apple, white grape, white cranberry  and cider) without pulp  Noncarbonated, powder based beverages  (lemonade and Lázaro-Aid)  Sodas (Sprite, 7-Up, ginger ale and seltzer)  Coffee or tea (without milk or cream)  Gatorade    -  No Alcohol or cannabis products for at least 24 hours before surgery.     Which medicines can I take?    Hold Aspirin for 7 days before surgery.   Hold Multivitamins for 7 days before surgery.  Hold Supplements for 7 days before surgery.  Hold Ibuprofen (Advil, Motrin) for 1 day(s) before surgery--unless otherwise directed by surgeon.  Hold Naproxen (Aleve) for 4 days before surgery.    -  DO NOT take these medications the day of surgery:  Lasix and lisinopril and spironolactone.    -  PLEASE TAKE these medications the day of surgery:  Tylenol if needed; take other scheduled morning medications.  Bring inhalers if currently using.    How do I prepare myself?  - Please take 2 showers (one the night prior to surgery and one the morning of surgery) using Scrubcare or Hibiclens soap.    Use this  soap only from the neck to your toes.     Leave the soap on your skin for one minute--then rinse thoroughly.      You may use your own shampoo and conditioner. No other hair products.   - Please remove all jewelry and body piercings.  - No lotions, deodorants or fragrance.  - No makeup or fingernail polish.   - Bring your ID and insurance card.    -If you use a CPAP machine, please bring the CPAP machine, tubing, and mask to hospital.    -If you have a Deep Brain Stimulator, Spinal Cord Stimulator, or any Neuro Stimulator device---you must bring the remote control to the hospital.      ALL PATIENTS GOING HOME THE SAME DAY OF SURGERY ARE REQUIRED TO HAVE A RESPONSIBLE ADULT TO DRIVE AND BE IN ATTENDANCE WITH THEM FOR 24 HOURS FOLLOWING SURGERY.    Covid testing policy as of 12/06/2022  Your surgeon will notify and schedule you for a COVID test if one is needed before surgery--please direct any questions or COVID symptoms to your surgeon      Questions or Concerns:    - For any questions regarding the day of surgery or your hospital stay, please contact the Pre Admission Nursing Office at [phone].       - If you have health changes between today and your surgery, please call your surgeon.       - For questions after surgery, please call your surgeons office.           Current Visitor Guidelines    You may have 2 visitors in the pre op area.    Visiting hours: 8 a.m. to 8:30 p.m.    Patients confirmed or suspected to have symptoms of COVID 19 or flu:     No visitors allowed for adult patients.   Children (under age 18) can have 1 named visitor.     People who are sick or showing symptoms of COVID 19 or flu:    Are not allowed to visit patients--we can only make exceptions in special situations.       Please follow these guidelines for your visit:          Please maintain social distance          Masking is optional--however at times you may be asked to wear a mask for the safety of yourself and others     Clean  your hands with alcohol hand . Do this when you arrive at and leave the building and patient room,    And again after you touch your mask or anything in the room.     Go directly to and from the room you are visiting.     Stay in the patient s room during your visit. Limit going to other places in the hospital as much as possible     Leave bags and jackets at home or in the car.     For everyone s health, please don t come and go during your visit. That includes for smoking   during your visit.

## 2023-12-27 NOTE — H&P
Pre-Operative H & P     CC:  Preoperative exam to assess for increased cardiopulmonary risk while undergoing surgery and anesthesia.    Date of Encounter: 12/27/2023  Primary Care Physician:  Hailee Henry     Reason for visit:   Encounter Diagnoses   Name Primary?    Pre-op evaluation Yes    Neurogenic bladder     Ureteral stone     Type 2 diabetes mellitus without complication, without long-term current use of insulin (H)        HPI  Bhavani White is a 68 year old female who presents for pre-operative H & P in preparation for  Procedure Information       Case: [id number] Date/Time: 01/05/24 1245    Procedures:       CYSTOSCOPY, WITH BOTULINUM TOXIN INJECTION (Urethra)      CYSTOURETEROSCOPY, WITH RETROGRADE PYELOGRAM, HOLMIUM LASER LITHOTRIPSY OF URETERAL CALCULUS, AND STENT INSERTION (Left: Flank)    Anesthesia type: General    Diagnosis:       Neurogenic bladder [N31.9]      Ureteral stone [N20.1]    Pre-op diagnosis:       Neurogenic bladder [N31.9]      Ureteral stone [N20.1]    Location: [place] [address] / [facility]    Providers: Karel Nelson MD            Patient is being evaluated for comorbid conditions of HTN, HLD, history of stress induced cardiomyopathy/type 2 nontransmural myocardial infarction 2019, ANIYA, asthma, history of c-spine fusion, osteoarthritis, chronic pain, type 2 diabetes, hypothyroidism, obesity, gastroparesis, cirrhosis, history of SBO, history of perforated duodenal ulcer, CKD Stage 2, neurogenic bladder with suprapubic catheter, anxiety, depression, and history of MRSA.    She was admitted in October for management of left ureteral stone. She underwent a cystoscopy with left retrograde pyelogram and left ureteral stent placement on 10/30/23. She is due to have the stent removed. She has been in communication with the urology team and is now scheduled for surgery as above.     History is obtained from the patient and chart review    Hx of  abnormal bleeding or anti-platelet use: ASA 81 mg    Menstrual history: No LMP recorded. Patient has had a hysterectomy.     Past Medical History  Past Medical History:   Diagnosis Date    Abdominal wall cellulitis 2020    Abdominopelvic abscess (H) 2020    Anxiety     Asthma     Depression     DM (diabetes mellitus) (H)     with peripheral neuropathy    Fracture of lower extremity 2013    Frequent UTI     History of blood transfusion     History of ulcer disease 2014    She notes from NSAIDs; nearly . Discussed a hospitalization at [place] for this.    Hypertension     Hypothyroid     Iatrogenic Cushing's disease     off prednisone now    Infection due to alpha-hemolytic Streptococcus 2020    Morbid obesity (H)     MRSA (methicillin resistant staph aureus) culture positive 2012    repeat cx 10/12/2012 no staph mentioned.    ANIYA -- noncompliant with CPAP     Osteoarthritis     multiple joings.    Other chronic pain     Perforated duodenal ulcer -- S/P Surgery 2019    SBO (small bowel obstruction) (H) 2020    Sepsis (WBC 20K, ) 2020    Sleep apnea     No longer uses cpap.       Past Surgical History  Past Surgical History:   Procedure Laterality Date    ABDOMEN SURGERY      APPLY WOUND VAC  2019    Procedure: Apply Wound Vac;  Surgeon: Ayan Lopez MD;  Location: [facility]    CLOSE SECONDARY WOUND ABDOMEN N/A 2020    Procedure: SECONDARY ABDOMINAL WOUND CLOSURE;  Surgeon: Ayan Lopez MD;  [facility]    COMBINED CYSTOSCOPY, RETROGRADES, EXCHANGE STENT URETER(S) Left 10/30/2023    Procedure: Combined cystoscopy, left retrogrades, left stent placement;  Surgeon: Steven Shen MD;  [facility]    CYSTOSCOPY N/A 2018    Procedure: CYSTOSCOPY;  Cystoscopy and Botox Injection Into the Bladder and suprapubic tube exchange;  Surgeon: Yoandy Rios MD;  [facility]    CYSTOSCOPY N/A 3/15/2019     Procedure: Cystoscopy, suprapubic tube exchange;  Surgeon: Yoandy Rios MD;  [facility]    CYSTOSCOPY FLEXIBLE, CYSTOSTOMY, INSERT TUBE SUPRAPUBIC, COMBINED N/A 11/1/2019    Procedure: Cystoscopy, Bladder Botox Injection, Suprapubic Tube Change;  Surgeon: Yoanyd Rios MD;  [facility]    CYSTOSCOPY, INJECT BOTOX N/A 11/18/2022    Procedure: CYSTOSCOPY, WITH BOTULINUM TOXIN INJECTION;  Surgeon: Yoandy Rios MD;  [facility]    CYSTOSCOPY, INTRAVESICAL INJECTION N/A 8/23/2017    Procedure: CYSTOSCOPY, INTRAVESICAL INJECTION;  Cystoscopy, Botox Injection Into The Bladder  Latex Allergy ;  Surgeon: Yoandy Rios MD;  [facility]    CYSTOSCOPY, INTRAVESICAL INJECTION N/A 3/8/2018    Procedure: CYSTOSCOPY, INTRAVESICAL INJECTION;  Cystoscopy, Botox Injection Into The Bladder and suprapubic catheter exchange;  Surgeon: Yoandy Rios MD;  [facility]    DISCECTOMY, FUSION CERVICAL ANTERIOR THREE+ LEVELS, COMBINED Left 5/3/2016    Procedure: COMBINED DISCECTOMY, FUSION CERVICAL ANTERIOR THREE+ LEVELS;  Surgeon: Jasvir Torres MD;  [facility]    ENTEROSCOPY SMALL BOWEL N/A 6/8/2020    Procedure: Enteroscopy small bowel;  Surgeon: Gabe Forte MD;  Location:  GI    GASTROSTOMY, INSERT TUBE, COMBINED N/A 1/1/2020    Procedure: GASTRIC-JEJUNAL FEEDING TUBE INSERTION;  Surgeon: Ayan Lopez MD;  Location:  OR    GENITOURINARY SURGERY      suprapubic catheter    HERNIA REPAIR  1957    double hernia    HYSTERECTOMY, PAP NO LONGER INDICATED      CELIA and large ovarian tumor removed    INJECT BOTOX N/A 9/21/2018    Procedure: INJECT BOTOX;;  Surgeon: Yoandy Rios MD;  [facility]    INJECT BOTOX N/A 3/15/2019    Procedure: Inject Botox into Bladder;  Surgeon: Yoandy Rios MD;  [facility]    IR GASTRO JEJUNOSTOMY TUBE PLACEMENT  6/11/2020    IR GASTROSTOMY TUBE CHANGE  6/30/2020    IR JEJUNOSTOMY TUBE  CHANGE  2/4/2020    IR JEJUNOSTOMY TUBE CHANGE  2/19/2020    LAPAROSCOPY DIAGNOSTIC (GENERAL) N/A 12/30/2019    Procedure: Laparoscopy diagnostic (general);  Surgeon: Ayan Lopez MD;  [facility]    LAPAROTOMY EXPLORATORY N/A 1/1/2020    Procedure: EXPLORATORY LAPAROTOMY;  Surgeon: Ayan Lopez MD;  [facility]    LAPAROTOMY EXPLORATORY N/A 12/30/2019    Procedure: LAPAROTOMY, REPAIR OF ULCER PERFORATION;  Surgeon: Ayan Lopez MD;  [facility]    SURGICAL HISTORY OF -       left cataract surgery    SURGICAL HISTORY OF -   12/14    right cataract surgery and left laser revision    SURGICAL HISTORY OF -   March 2015    left carpal tunnel release    TONSILLECTOMY  1974    [facility] LAMINECTOMY,FACETECTOMY,LUMBAR  1982    [facility]C TOTAL KNEE ARTHROPLASTY  1999    left       Prior to Admission Medications  Current Outpatient Medications   Medication Sig Dispense Refill    albuterol (PROVENTIL) (2.5 MG/3ML) 0.083% neb solution USE 1 VIAL VIA NEBULIZATION EVERY 4 HOURS AS NEEDED 75 mL 1    aspirin 81 MG EC tablet Take 81 mg by mouth every morning      DULoxetine (CYMBALTA) 60 MG capsule Take 120 mg by mouth every morning      fluticasone-vilanterol (BREO ELLIPTA) 100-25 MCG/ACT inhaler INHALE 1 PUFF INTO THE LUNGS DAILY (Patient taking differently: Inhale 1 puff into the lungs every morning) 60 each 1    furosemide (LASIX) 40 MG tablet Take 40 mg by mouth every evening      levothyroxine (SYNTHROID/LEVOTHROID) 88 MCG tablet Take 1 tablet (88 mcg) by mouth daily (Patient taking differently: Take 88 mcg by mouth every morning) 90 tablet 0    lidocaine (XYLOCAINE) 5 % external ointment Apply topically every 4 hours as needed for moderate pain (urethral pain) 50 g 0    lisinopril (ZESTRIL) 2.5 MG tablet Take 1 tablet (2.5 mg) by mouth daily (Patient taking differently: Take 2.5 mg by mouth every morning) 90 tablet 2    methadone (DOLOPHINE) 5 MG tablet Take 5 mg by mouth every 12 hours       "naloxone (NARCAN) 4 MG/0.1ML nasal spray CALL 911. SPR CONTENTS OF ONE SPRAYER (0.1ML) INTO ONE NOSTRIL. REPEAT IN 2-3 MIN IF SYMPTOMS OF OPIOID EMERGENCY PERSIST, ALTERNATE NOSTRILS      oxyBUTYnin ER (DITROPAN XL) 15 MG 24 hr tablet Take 1 tablet (15 mg) by mouth daily (Patient taking differently: Take 15 mg by mouth every morning) 30 tablet 8    phenazopyridine (PYRIDIUM) 100 MG tablet Take 1 tablet (100 mg) by mouth 3 times daily as needed for urinary tract discomfort 30 tablet 0    rosuvastatin (CRESTOR) 20 MG tablet Take 1 tablet (20 mg) by mouth daily (Patient taking differently: Take 20 mg by mouth every evening) 90 tablet 3    semaglutide (OZEMPIC, 0.25 OR 0.5 MG/DOSE,) 2 MG/3ML pen INJECT 0.5MG SUBCUTANEOUS EVERY 7 DAYS (Patient taking differently: Inject 0.5 mg Subcutaneous every 7 days TUESDAY'S) 3 mL 1    senna-docusate (SENOKOT-S/PERICOLACE) 8.6-50 MG tablet Take 2 tablets by mouth 2 times daily as needed for constipation 100 tablet 0    blood glucose monitoring (NO BRAND SPECIFIED) meter device kit Use to test blood sugar one times daily or as directed. Preferred blood glucose meter OR supplies to accompany: Blood Glucose Monitor Brands: per insurance. 1 kit 0    spironolactone (ALDACTONE) 100 MG tablet Take 100 mg by mouth daily         Allergies  Allergies   Allergen Reactions    Povidone Iodine      \"mild skin irritation\" per patient report    Toradol [Ketorolac] Other (See Comments)     Pt gets nightmares    Tramadol Other (See Comments)       Social History  Social History     Socioeconomic History    Marital status:      Spouse name: Sergio    Number of children: 2    Years of education: Not on file    Highest education level: Bachelor's degree (e.g., BA, AB, BS)   Occupational History    Not on file   Tobacco Use    Smoking status: Never    Smokeless tobacco: Never   Vaping Use    Vaping Use: Never used   Substance and Sexual Activity    Alcohol use: No    Drug use: Yes     Types: " Marijuana     Comment: Medical canibis, methadone for chronic back pain    Sexual activity: Not Currently   Other Topics Concern    Parent/sibling w/ CABG, MI or angioplasty before 65F 55M? Not Asked   Social History Narrative    1 son passed away    1 son transfemale     Social Determinants of Health     Financial Resource Strain: Low Risk  (11/12/2023)    Financial Resource Strain     Within the past 12 months, have you or your family members you live with been unable to get utilities (heat, electricity) when it was really needed?: No   Food Insecurity: Low Risk  (11/12/2023)    Food Insecurity     Within the past 12 months, did you worry that your food would run out before you got money to buy more?: No     Within the past 12 months, did the food you bought just not last and you didn t have money to get more?: No   Transportation Needs: Low Risk  (11/12/2023)    Transportation Needs     Within the past 12 months, has lack of transportation kept you from medical appointments, getting your medicines, non-medical meetings or appointments, work, or from getting things that you need?: No   Physical Activity: Inactive (2/7/2022)    Exercise Vital Sign     Days of Exercise per Week: 0 days     Minutes of Exercise per Session: 0 min   Stress: No Stress Concern Present (2/7/2022)    [employer] [place] [employer] - Occupational Stress Questionnaire     Feeling of Stress : Not at all   Social Connections: Unknown (2/7/2022)    Social Connection and Isolation Panel [NHANES]     Frequency of Communication with Friends and Family: More than three times a week     Frequency of Social Gatherings with Friends and Family: Patient declined     Attends Hindu Services: Patient declined     Active Member of Clubs or Organizations: No     Attends Club or Organization Meetings: Not on file     Marital Status:    Interpersonal Safety: Not At Risk (4/20/2020)    Humiliation, Afraid, Rape, and Kick questionnaire      Fear of Current or Ex-Partner: No     Emotionally Abused: No     Physically Abused: No     Sexually Abused: No   Housing Stability: Low Risk  (11/12/2023)    Housing Stability     Do you have housing? : Yes     Are you worried about losing your housing?: No       Family History  Family History   Problem Relation Age of Onset    Hypertension Mother     Heart Disease Mother         bypass    Depression Mother     Hypertension Father     Connective Tissue Disorder Father         ankylosing spondylitis    Cancer Father         colon; prostate    Other Cancer Father 89        bladder cancer    Depression Sister     Depression Son     Anesthesia Reaction No family hx of     Venous thrombosis No family hx of     Bleeding Disorder No family hx of        Review of Systems  The complete review of systems is negative other than noted in the HPI or here.   Anesthesia Evaluation   Pt has had prior anesthetic.     No history of anesthetic complications       ROS/MED HX  ENT/Pulmonary:     (+) sleep apnea, doesn't use CPAP,                   Mild Persistent, asthma  Treatment: Inhaler prn and Inhaler daily,              (-) tobacco use and recent URI   Neurologic: Comment: History of c-spine fusion    (+)    peripheral neuropathy, - hands, feet.                        (-) no seizures, no CVA and no TIA   Cardiovascular: Comment: Stress induced cardiomyopathy/type 2 nontransmural myocardial infarction 2019    (+) Dyslipidemia hypertension- -   -  - -                                 Previous cardiac testing   Echo: Date: 3/14/2020 Results:  Interpretation Summary     No parasternal views.  The visual ejection fraction is estimated at 60-65%.  Grade II or moderate diastolic dysfunction.  The right ventricle is moderately dilated.  Moderately decreased right ventricular systolic function  The left atrium is moderately dilated.  Right ventricular systolic pressure could not be approximated due to  inadequate tricuspid  regurgitation.  IVC diameter and respiratory changes fall into an intermediate range  suggesting an RA pressure of 8 mmHg.  Limited views were obtained. Compared to the prior study dated 1/14/20, there  have been no changes.    Stress Test:  Date: Results:    ECG Reviewed:  Date: 10/31/2023 Results:  Sinus rhythm with sinus arrhythmia with occasional Premature ventricular complexes   Non-specific intra-ventricular conduction block   Septal infarct (cited on or before 05-MAY-2020)   Abnormal ECG   When compared with ECG of 05-MAY-2020 17:32,   Premature ventricular complexes are now Present     Cath:  Date: Results:      METS/Exercise Tolerance:  Comment: METS ~3 Wheelchair bound but is completely independent with ADLs   Hematologic:     (+)       history of blood transfusion, no previous transfusion reaction, Known PRBC Anitbodies:No    (-) history of blood clots and anemia   Musculoskeletal: Comment: History of femur fracture with poor healing after  S/p left knee replacement  (+)  arthritis,             GI/Hepatic: Comment: Gastroparesis  History of perforated duodenal ulcer  History of SBO  Ventral hernia    (+)             liver disease (cirrhosis, ascites),       Renal/Genitourinary: Comment: Neurogenic bladder with suprapubic catheter  Hx of ureteral stone    (+) renal disease, type: CRI, Pt does not require dialysis,           Endo:     (+)  type II DM, Last HgA1c: 6.2, date: 8/8/2023, Not using insulin, - not using insulin pump.  not previously admitted for DM/DKA. Diabetic complications: neuropathy gastroparesis. thyroid problem, hypothyroidism,    Obesity,       Psychiatric/Substance Use:     (+) psychiatric history anxiety and depression  H/O chronic opiod use . Recreational drug usage: Cannabis.    Infectious Disease:     (+)   MRSA,      (-) Recent Fever   Malignancy:  - neg malignancy ROS     Other:  - neg other ROS    (+)  , H/O Chronic Pain (entire back),         Virtual visit -  No vitals were  obtained    Physical Exam  Constitutional: Pleasant female, no apparent distress, and appears stated age.  Eyes: Pupils equal  HENT: Normocephalic and atraumatic  Respiratory: Non labored breathing on room air  Neurologic: Awake, alert, oriented to name, place and time.   Neuropsychiatric: Calm, cooperative. Normal affect.       Prior Labs/Diagnostic Studies   All labs and imaging personally reviewed     EKG/ stress test - if available please see in ROS above   Echo result w/o MOPS: Interpretation Summary No parasternal views.The visual ejection fraction is estimated at 60-65%.Grade II or moderate diastolic dysfunction.The right ventricle is moderately dilated.Moderately decreased right ventricular systolic functionThe left atrium is moderately dilated.Right ventricular systolic pressure could not be approximated due toinadequate tricuspid regurgitation.IVC diameter and respiratory changes fall into an intermediate rangesuggesting an RA pressure of 8 mmHg.Limited views were obtained. Compared to the prior study dated 1/14/20, therehave been no changes.    Cardiac MRI 5/5/2020  SUMMARY  1. The LV is normal in cavity size and wall thickness. The global systolic function is normal. The LVEF is 67%. There is moderate hypokinesis of the mid to apical inferior wall.   2. The RV is normal in cavity size. The global systolic function is normal. The RVEF is 62%.   3. Both atria are normal in size.  4. There is no significant valvular disease.   5. Regadenoson stress perfusion imaging demonstrates a small fixed perfusion defect involving the apical  inferior wall.       This same area demonstrates a small focal area of transmural enhancement. Collectively,  these findings are consistent with a small infarction in the distal RCA distribution with no significant  obed-infarct ischemia.   6. Small bilateral pleural effusions are noted.      CONCLUSIONS:  Normal left ventricular size and systolic function. Moderate hypokinesis of  the mid to apical  inferior wall is noted. Regadenoson stress perfusion imaging demonstrates a small fixed perfusion defect involving the apical inferior wall.  This same area demonstrates a small focal area of transmural enhancement. Collectively, these findings are consistent with a small infarction in the distal RCA distribution with no significant obed-infarct ischemia.     The patient's records and results personally reviewed by this provider.     Outside records reviewed from: Care Everywhere      Assessment  Bhavani White is a 68 year old female seen as a PAC referral for risk assessment and optimization for anesthesia.    Plan/Recommendations  Pt will be optimized for the proposed procedure.  See below for details on the assessment, risk, and preoperative recommendations    NEUROLOGY  - No history of TIA, CVA or seizure  - Spinal stenosis, history of cervical spine fusion  - Chronic Pain  On chronic opiates, morphine equivilant =  MME/Day   Patient is prescribed Methadone for chronic back pain. She is followed monthly by Meghan Umanzor NP at [facility]. Patient was encouraged to reach out to pain provider with any questions regarding methadone dosing in the preoperative period.     -Post Op delirium risk factors:  High co-morbid index    ENT  - No current airway concerns.  Will need to be reassessed day of surgery.  Mallampati: Unable to assess  TM: Unable to assess    CARDIAC  - No history of Afib  - Stress induced cardiomyopathy/type 2 nontransmural myocardial infarction 2019  Occurred while patient was hospitalized for a perforated duodenal ulcer/septic shock. EF initially 30% but improved to 60% prior to discharge. A follow up cardiac MRI in 2020 showed normal EF with  a small area of transmural infarction of the inferior wall and inferior apex without an inducible ischemia.   - Hypertension  Well controlled  - Hyperlipidemia  Well controlled on home regimen  -  "Patient is followed by Dr. Paula (cardiology), last visit 10/10/2022, 1 year follow up recommended  - Patient activity is limited due to pain/spinal stenosis. She primarily uses a power wheelchair but states that she is completely independent in her ADLs and is able to stand/pivot/transfer herself.  - She denies exertional symptoms today.     - METS (Metabolic Equivalents)  Patient CANNOT perform 4 METS exercise          Total Score: 1    Functional Capacity: Unable to complete 4 METS      RCRI-Low risk: Class 2 0.9% complication rate            Total Score: 1    RCRI: CAD        PULMONARY  - Obstructive Sleep Apnea  ANIYA without home CPAP use.    - Asthma  Well controlled  - Tobacco History    History   Smoking Status    Never   Smokeless Tobacco    Never       GI  - Cirrhosis/Ascites  Followed by Dr. Forte, last visit 10/25/23, noted to be improving at that time. Estimated MELD ~8-9. Patient continues to be compliant with Lasix and Spironolactone. Will hold DOS.  - Gastroparesis  - History of perforated duodenal ulcer  - History of SBO    PONV High Risk  Total Score: 3           1 AN PONV: Pt is Female    1 AN PONV: Patient is not a current smoker    1 AN PONV: Intended Post Op Opioids        /RENAL  - CKD Stage 2. Baseline Creatinine  0.86  - Neurogenic bladder with suprapubic catheter  - History of ureteral stone s/p stent placement. Patient is due for stent removal.   - History of bladder spasms s/p multiple botox injection treatments  - Above surgery planned for further management    ENDOCRINE    - BMI: Estimated body mass index is 33.75 kg/m  (pended) as calculated from the following:    Height as of 11/22/23: (P) 1.651 m (5' 5\").    Weight as of 11/22/23: (P) 92 kg (202 lb 13.2 oz).  Obesity (BMI >30)  - Diabetes  Hemoglobin A1C (%)   Date Value   08/08/2023 6.2 (H)   03/19/2021 5.8 (H)     Diabetes Mellitus, Type 2, non-insulin dependent.  Will update A1c. Hold Ozempic at least 7 days prior to surgery, " last dose 12/26. Recommend close monitoring of the patient's blood glucose levels throughout the perioperative period.    HEME  VTE Low Risk 0.26%            Total Score: 1    VTE: Greater than 59 yrs old      - Platelet disfunction secondary to Aspirin ([place], many others). Hold DOS only, low bleed risk procedure.   - History of blood transfusion. No known transfusion reaction.     MSK  - Osteoarthritis  - Chronic back pain  - Recommend consideration for careful positioning to limit patient discomfort.    PSYCH  - Anxiety, Depression  Continue current medications    Different anesthesia methods/types have been discussed with the patient, but they are aware that the final plan will be decided by the assigned anesthesia provider on the date of service.    The patient is optimized for their procedure. AVS with information on surgery time/arrival time, meds and NPO status given by nursing staff. No further diagnostic testing indicated.    Please refer to the physical examination documented by the anesthesiologist in the anesthesia record on the day of surgery.    Video-Visit Details    Type of service:  Video Visit    Provider received verbal consent for a Video Visit from the patient? Yes   Video Start Time:  1517  Video End Time: 1533    Originating Location (pt. Location): Home  \  Distant Location (provider location):  Off-site  Mode of Communication:  Video Conference via [facility]  On the day of service:     Prep time: 20 minutes  Visit time: 16 minutes  Documentation time: 27 minutes  ------------------------------------------  Total time: 63 minutes      Jamee Levine PA-C  Preoperative Assessment Center  [facility]  [facility]  Phone: [phone]  Fax: [phone]

## 2023-12-27 NOTE — PROGRESS NOTES
Bhavani is a 68 year old who is being evaluated via a billable video visit.      How would you like to obtain your AVS? MyChart  If the video visit is dropped, the invitation should be resent by: Text to cell phone: [phone]          HPI           Review of Systems           Physical Exam

## 2023-12-29 NOTE — TELEPHONE ENCOUNTER
Called Bhavani to check on the status of her urine culture.  She stated that she was told at her pre-op appointment she needed one but had not gotten it done.  With the upcoming holiday there is not enough time for her to give a sample and get results so a 3 day course of antibiotics is being sent to her pharmacy.  She will take these starting 1/2.  She is having her friend Rachael bring her to her upcoming procedure.  She stated that her friend or her  would be with her the 24 hours after her procedure.  She has this writers direct number if she has any other questions or concerns.      Karoline Paige, RN   4:36 PM

## 2024-01-01 ENCOUNTER — ANESTHESIA (OUTPATIENT)
Dept: SURGERY | Facility: AMBULATORY SURGERY CENTER | Age: 69
End: 2024-01-01
Payer: COMMERCIAL

## 2024-01-01 ENCOUNTER — ANCILLARY PROCEDURE (OUTPATIENT)
Dept: RADIOLOGY | Facility: AMBULATORY SURGERY CENTER | Age: 69
End: 2024-01-01
Attending: STUDENT IN AN ORGANIZED HEALTH CARE EDUCATION/TRAINING PROGRAM
Payer: COMMERCIAL

## 2024-01-01 ENCOUNTER — LAB (OUTPATIENT)
Dept: LAB | Facility: CLINIC | Age: 69
End: 2024-01-01
Payer: COMMERCIAL

## 2024-01-01 ENCOUNTER — HOSPITAL ENCOUNTER (OUTPATIENT)
Facility: AMBULATORY SURGERY CENTER | Age: 69
Discharge: HOME | End: 2024-01-05
Attending: STUDENT IN AN ORGANIZED HEALTH CARE EDUCATION/TRAINING PROGRAM | Admitting: STUDENT IN AN ORGANIZED HEALTH CARE EDUCATION/TRAINING PROGRAM
Payer: COMMERCIAL

## 2024-01-01 ENCOUNTER — PRE VISIT (OUTPATIENT)
Dept: UROLOGY | Facility: CLINIC | Age: 69
End: 2024-01-01
Payer: COMMERCIAL

## 2024-01-01 VITALS
RESPIRATION RATE: 14 BRPM | DIASTOLIC BLOOD PRESSURE: 65 MMHG | SYSTOLIC BLOOD PRESSURE: 121 MMHG | WEIGHT: 200 LBS | BODY MASS INDEX: 33.32 KG/M2 | TEMPERATURE: 98 F | OXYGEN SATURATION: 94 % | HEART RATE: 88 BPM | HEIGHT: 65 IN

## 2024-01-01 DIAGNOSIS — Z01.818 PRE-OP EVALUATION: ICD-10-CM

## 2024-01-01 DIAGNOSIS — N31.9 NEUROGENIC BLADDER: Primary | ICD-10-CM

## 2024-01-01 DIAGNOSIS — N31.9 NEUROGENIC BLADDER: ICD-10-CM

## 2024-01-01 DIAGNOSIS — E11.9 TYPE 2 DIABETES MELLITUS WITHOUT COMPLICATION, WITHOUT LONG-TERM CURRENT USE OF INSULIN (H): ICD-10-CM

## 2024-01-01 DIAGNOSIS — N20.1 LEFT URETERAL STONE: ICD-10-CM

## 2024-01-01 DIAGNOSIS — N39.0 RECURRENT UTI: ICD-10-CM

## 2024-01-01 LAB
ALBUMIN UR-MCNC: 100 MG/DL
ANION GAP SERPL CALCULATED.3IONS-SCNC: 5 MMOL/L (ref 7–15)
APPEARANCE UR: ABNORMAL
BACTERIA #/AREA URNS HPF: ABNORMAL /HPF
BACTERIA UR CULT: ABNORMAL
BILIRUB UR QL STRIP: NEGATIVE
BUN SERPL-MCNC: 22.7 MG/DL (ref 8–23)
CALCIUM SERPL-MCNC: 10.4 MG/DL (ref 8.8–10.2)
CHLORIDE SERPL-SCNC: 100 MMOL/L (ref 98–107)
COLOR UR AUTO: ABNORMAL
CREAT SERPL-MCNC: 1.14 MG/DL (ref 0.51–0.95)
DEPRECATED HCO3 PLAS-SCNC: 36 MMOL/L (ref 22–29)
EGFRCR SERPLBLD CKD-EPI 2021: 53 ML/MIN/1.73M2
ERYTHROCYTE [DISTWIDTH] IN BLOOD BY AUTOMATED COUNT: 15.2 % (ref 10–15)
GLUCOSE SERPL-MCNC: 84 MG/DL (ref 70–99)
GLUCOSE UR STRIP-MCNC: NEGATIVE MG/DL
HBA1C MFR BLD: 6.2 %
HCT VFR BLD AUTO: 44 % (ref 35–47)
HGB BLD-MCNC: 14.1 G/DL (ref 11.7–15.7)
HGB UR QL STRIP: ABNORMAL
KETONES UR STRIP-MCNC: NEGATIVE MG/DL
LEUKOCYTE ESTERASE UR QL STRIP: ABNORMAL
MCH RBC QN AUTO: 27 PG (ref 26.5–33)
MCHC RBC AUTO-ENTMCNC: 32 G/DL (ref 31.5–36.5)
MCV RBC AUTO: 84 FL (ref 78–100)
NITRATE UR QL: NEGATIVE
PH UR STRIP: 8 [PH] (ref 5–7)
PLATELET # BLD AUTO: 272 10E3/UL (ref 150–450)
POTASSIUM SERPL-SCNC: 4.5 MMOL/L (ref 3.4–5.3)
RBC # BLD AUTO: 5.23 10E6/UL (ref 3.8–5.2)
RBC URINE: >182 /HPF
SODIUM SERPL-SCNC: 141 MMOL/L (ref 135–145)
SP GR UR STRIP: 1.02 (ref 1–1.03)
UROBILINOGEN UR STRIP-MCNC: NORMAL MG/DL
WBC # BLD AUTO: 6.5 10E3/UL (ref 4–11)
WBC CLUMPS #/AREA URNS HPF: PRESENT /HPF
WBC URINE: 176 /HPF

## 2024-01-01 PROCEDURE — 87086 URINE CULTURE/COLONY COUNT: CPT

## 2024-01-01 PROCEDURE — 52351 CYSTOURETERO & OR PYELOSCOPE: CPT

## 2024-01-01 PROCEDURE — 52287 CYSTOSCOPY CHEMODENERVATION: CPT

## 2024-01-01 PROCEDURE — 80048 BASIC METABOLIC PNL TOTAL CA: CPT

## 2024-01-01 PROCEDURE — 85027 COMPLETE CBC AUTOMATED: CPT

## 2024-01-01 PROCEDURE — 83036 HEMOGLOBIN GLYCOSYLATED A1C: CPT

## 2024-01-01 PROCEDURE — 36415 COLL VENOUS BLD VENIPUNCTURE: CPT

## 2024-01-01 PROCEDURE — 52351 CYSTOURETERO & OR PYELOSCOPE: CPT | Mod: GC | Performed by: UROLOGY

## 2024-01-01 PROCEDURE — 81001 URINALYSIS AUTO W/SCOPE: CPT

## 2024-01-01 PROCEDURE — 52287 CYSTOSCOPY CHEMODENERVATION: CPT | Mod: GC | Performed by: UROLOGY

## 2024-01-01 RX ORDER — LIDOCAINE HYDROCHLORIDE 20 MG/ML
INJECTION, SOLUTION INFILTRATION; PERINEURAL PRN
Status: DISCONTINUED | OUTPATIENT
Start: 2024-01-01 | End: 2024-01-01

## 2024-01-01 RX ORDER — SODIUM CHLORIDE, SODIUM LACTATE, POTASSIUM CHLORIDE, CALCIUM CHLORIDE 600; 310; 30; 20 MG/100ML; MG/100ML; MG/100ML; MG/100ML
INJECTION, SOLUTION INTRAVENOUS CONTINUOUS
Status: DISCONTINUED | OUTPATIENT
Start: 2024-01-01 | End: 2024-01-01 | Stop reason: HOSPADM

## 2024-01-01 RX ORDER — OXYCODONE HYDROCHLORIDE 5 MG/1
5 TABLET ORAL
Status: CANCELLED | OUTPATIENT
Start: 2024-01-01

## 2024-01-01 RX ORDER — CEFAZOLIN SODIUM 2 G/50ML
2 SOLUTION INTRAVENOUS
Status: COMPLETED | OUTPATIENT
Start: 2024-01-01 | End: 2024-01-01

## 2024-01-01 RX ORDER — FENTANYL CITRATE 50 UG/ML
25 INJECTION, SOLUTION INTRAMUSCULAR; INTRAVENOUS EVERY 5 MIN PRN
Status: DISCONTINUED | OUTPATIENT
Start: 2024-01-01 | End: 2024-01-01 | Stop reason: HOSPADM

## 2024-01-01 RX ORDER — ONDANSETRON 4 MG/1
4 TABLET, ORALLY DISINTEGRATING ORAL EVERY 30 MIN PRN
Status: DISCONTINUED | OUTPATIENT
Start: 2024-01-01 | End: 2024-01-01 | Stop reason: HOSPADM

## 2024-01-01 RX ORDER — ONDANSETRON 2 MG/ML
4 INJECTION INTRAMUSCULAR; INTRAVENOUS EVERY 30 MIN PRN
Status: DISCONTINUED | OUTPATIENT
Start: 2024-01-01 | End: 2024-01-01 | Stop reason: HOSPADM

## 2024-01-01 RX ORDER — PROPOFOL 10 MG/ML
INJECTION, EMULSION INTRAVENOUS CONTINUOUS PRN
Status: DISCONTINUED | OUTPATIENT
Start: 2024-01-01 | End: 2024-01-01

## 2024-01-01 RX ORDER — GLYCOPYRROLATE 0.2 MG/ML
INJECTION, SOLUTION INTRAMUSCULAR; INTRAVENOUS PRN
Status: DISCONTINUED | OUTPATIENT
Start: 2024-01-01 | End: 2024-01-01

## 2024-01-01 RX ORDER — CEFAZOLIN SODIUM 2 G/50ML
2 SOLUTION INTRAVENOUS SEE ADMIN INSTRUCTIONS
Status: DISCONTINUED | OUTPATIENT
Start: 2024-01-01 | End: 2024-01-01 | Stop reason: HOSPADM

## 2024-01-01 RX ORDER — ONDANSETRON 2 MG/ML
INJECTION INTRAMUSCULAR; INTRAVENOUS PRN
Status: DISCONTINUED | OUTPATIENT
Start: 2024-01-01 | End: 2024-01-01

## 2024-01-01 RX ORDER — ONDANSETRON 2 MG/ML
4 INJECTION INTRAMUSCULAR; INTRAVENOUS EVERY 30 MIN PRN
Status: CANCELLED | OUTPATIENT
Start: 2024-01-01

## 2024-01-01 RX ORDER — LIDOCAINE 40 MG/G
CREAM TOPICAL
Status: DISCONTINUED | OUTPATIENT
Start: 2024-01-01 | End: 2024-01-01 | Stop reason: HOSPADM

## 2024-01-01 RX ORDER — HYDROMORPHONE HYDROCHLORIDE 1 MG/ML
0.4 INJECTION, SOLUTION INTRAMUSCULAR; INTRAVENOUS; SUBCUTANEOUS EVERY 5 MIN PRN
Status: DISCONTINUED | OUTPATIENT
Start: 2024-01-01 | End: 2024-01-01 | Stop reason: HOSPADM

## 2024-01-01 RX ORDER — LABETALOL HYDROCHLORIDE 5 MG/ML
10 INJECTION, SOLUTION INTRAVENOUS
Status: DISCONTINUED | OUTPATIENT
Start: 2024-01-01 | End: 2024-01-01 | Stop reason: HOSPADM

## 2024-01-01 RX ORDER — ONDANSETRON 4 MG/1
4 TABLET, ORALLY DISINTEGRATING ORAL EVERY 30 MIN PRN
Status: CANCELLED | OUTPATIENT
Start: 2024-01-01

## 2024-01-01 RX ORDER — DEXAMETHASONE SODIUM PHOSPHATE 4 MG/ML
INJECTION, SOLUTION INTRA-ARTICULAR; INTRALESIONAL; INTRAMUSCULAR; INTRAVENOUS; SOFT TISSUE PRN
Status: DISCONTINUED | OUTPATIENT
Start: 2024-01-01 | End: 2024-01-01

## 2024-01-01 RX ORDER — PROPOFOL 10 MG/ML
INJECTION, EMULSION INTRAVENOUS PRN
Status: DISCONTINUED | OUTPATIENT
Start: 2024-01-01 | End: 2024-01-01

## 2024-01-01 RX ORDER — FENTANYL CITRATE 50 UG/ML
INJECTION, SOLUTION INTRAMUSCULAR; INTRAVENOUS PRN
Status: DISCONTINUED | OUTPATIENT
Start: 2024-01-01 | End: 2024-01-01

## 2024-01-01 RX ORDER — OXYCODONE HYDROCHLORIDE 5 MG/1
10 TABLET ORAL
Status: CANCELLED | OUTPATIENT
Start: 2024-01-01

## 2024-01-01 RX ORDER — FENTANYL CITRATE 50 UG/ML
50 INJECTION, SOLUTION INTRAMUSCULAR; INTRAVENOUS EVERY 5 MIN PRN
Status: DISCONTINUED | OUTPATIENT
Start: 2024-01-01 | End: 2024-01-01 | Stop reason: HOSPADM

## 2024-01-01 RX ORDER — HYDROMORPHONE HYDROCHLORIDE 1 MG/ML
0.2 INJECTION, SOLUTION INTRAMUSCULAR; INTRAVENOUS; SUBCUTANEOUS EVERY 5 MIN PRN
Status: DISCONTINUED | OUTPATIENT
Start: 2024-01-01 | End: 2024-01-01 | Stop reason: HOSPADM

## 2024-01-01 RX ORDER — ACETAMINOPHEN 325 MG/1
975 TABLET ORAL ONCE
Status: COMPLETED | OUTPATIENT
Start: 2024-01-01 | End: 2024-01-01

## 2024-01-01 RX ORDER — PHENYLEPHRINE HYDROCHLORIDE 10 MG/ML
INJECTION INTRAVENOUS PRN
Status: DISCONTINUED | OUTPATIENT
Start: 2024-01-01 | End: 2024-01-01

## 2024-01-01 RX ADMIN — PHENYLEPHRINE HYDROCHLORIDE 100 MCG: 10 INJECTION INTRAVENOUS at 13:46

## 2024-01-01 RX ADMIN — PROPOFOL 175 MCG/KG/MIN: 10 INJECTION, EMULSION INTRAVENOUS at 13:25

## 2024-01-01 RX ADMIN — LIDOCAINE HYDROCHLORIDE 60 MG: 20 INJECTION, SOLUTION INFILTRATION; PERINEURAL at 13:25

## 2024-01-01 RX ADMIN — ACETAMINOPHEN 975 MG: 325 TABLET ORAL at 11:52

## 2024-01-01 RX ADMIN — SODIUM CHLORIDE, SODIUM LACTATE, POTASSIUM CHLORIDE, CALCIUM CHLORIDE: 600; 310; 30; 20 INJECTION, SOLUTION INTRAVENOUS at 13:16

## 2024-01-01 RX ADMIN — FENTANYL CITRATE 50 MCG: 50 INJECTION, SOLUTION INTRAMUSCULAR; INTRAVENOUS at 13:21

## 2024-01-01 RX ADMIN — DEXAMETHASONE SODIUM PHOSPHATE 4 MG: 4 INJECTION, SOLUTION INTRA-ARTICULAR; INTRALESIONAL; INTRAMUSCULAR; INTRAVENOUS; SOFT TISSUE at 13:25

## 2024-01-01 RX ADMIN — GLYCOPYRROLATE 0.2 MG: 0.2 INJECTION, SOLUTION INTRAMUSCULAR; INTRAVENOUS at 13:35

## 2024-01-01 RX ADMIN — ONDANSETRON 4 MG: 2 INJECTION INTRAMUSCULAR; INTRAVENOUS at 13:25

## 2024-01-01 RX ADMIN — FENTANYL CITRATE 50 MCG: 50 INJECTION, SOLUTION INTRAMUSCULAR; INTRAVENOUS at 13:24

## 2024-01-01 RX ADMIN — PHENYLEPHRINE HYDROCHLORIDE 100 MCG: 10 INJECTION INTRAVENOUS at 13:41

## 2024-01-01 RX ADMIN — PHENYLEPHRINE HYDROCHLORIDE 100 MCG: 10 INJECTION INTRAVENOUS at 13:35

## 2024-01-01 RX ADMIN — PROPOFOL 150 MG: 10 INJECTION, EMULSION INTRAVENOUS at 13:25

## 2024-01-01 RX ADMIN — CEFAZOLIN SODIUM 2 G: 2 SOLUTION INTRAVENOUS at 13:16

## 2024-01-05 NOTE — OP NOTE
OPERATIVE REPORT    PREOPERATIVE DIAGNOSIS:  Neurogenic bladder, nephrolithiasis    POSTOPERATIVE DIAGNOSIS: Same    PROCEDURES PERFORMED:   1. Cystourethroscopy with injection of botulinum toxin A 200units in 20cc sterile saline (14699)  2. Left diagnostic ureteroscopy (65434)  3. Left ureteral stent removal (92996)    STAFF SURGEON:   MD Karel Hsieh. MD     RESIDENT(S):  MD Jasmeet Salas MD    ANESTHESIA: General    ESTIMATED BLOOD LOSS: 1 mL.     DRAINS: 16Fr nguyen catheter    SIGNIFICANT FINDINGS:  1. No stone seen in left upper tract    OPERATIVE INDICATIONS:   Bhavani White is a(n) 68 year old female with a history of neurogenic bladder, also with a ureteral stone . The patient was counseled on the alternatives, risks, and benefits and elected to proceed with the above stated procedure.    DESCRIPTION OF PROCEDURE:   After verification of informed consent was obtained, the patient was brought to the operating room, and moved to the operating table. After adequate anesthesia was induced, the patient was repositioned in the lithotomy position and prepped and draped in the usual sterile fashion. A formal timeout was performed to confirm the correct patient, procedure and operative site.     We used a rigid cystoscope and inserted into a well lubricated urethra, we grabbed the prior stent ad pulled towards the urethra, we put a sensor wire through the stent, and established our access.    A 21-Divehi Valdovinos injection cystoscope was placed into the bladder.  The bladder mucosa appeared to be normal without stones, tumors or diverticulum on 360 degree inspection. The ureteral orifices were in the normal orthotopic position bilaterally.  We mixed 2 vials of 100 U botulinum toxin A into 20 mL of injectable saline for a total of (1 units/mL).  We performed a template injection procedure with 5 columns of 4 injections. All injections were placed deep to the mucosa into the detrusor muscle.   We then emptied her bladder to re-inspect our injection sites and found that there was a minimal amount of blood.     We inserted a flexible ureteroscope into the upper tract along side the wire. We did a retrograde pyelogram and found no filling defect or stones. We did a systematic pyeloscopy and found no upper tract stones, or tumors. Pull back ureteroscopy showed no stones or significant ureteral injury.    Her bladder was then emptied again using a nguyen catheter. The patient was returned to supine position and transported to recovery in stable condition.    The procedure was then concluded. The patient was transferred to the postanesthesia care unit in stable condition and tolerated the procedure well.    POSTOP PLAN:  1. DC when criteria met  2. Follow up for next botoxinjection in 6 months     Froilan Lucero MD      As the attending surgeon I, Yoandy Rios, was present for vaz portions of the procedure and available throughout the procedure.

## 2024-01-05 NOTE — ANESTHESIA PROCEDURE NOTES
Airway       Patient location during procedure: OR  Staff -        CRNA: Mattie Garcia APRN CRNA       Performed By: CRNAIndications and Patient Condition       Indications for airway management: obed-procedural         Mask difficulty assessment: 0 - not attempted    Final Airway Details       Final airway type: supraglottic airway    Supraglottic Airway Details        Type: LMA       Brand: LMA Unique       LMA size: 4    Post intubation assessment        Placement verified by: capnometry and equal breath sounds        Number of attempts at approach: 1       Secured with: tape       Ease of procedure: easy       Dentition: Intact

## 2024-01-05 NOTE — DISCHARGE INSTRUCTIONS
[facility]  Home Care Following Anesthesia  For 24 hours after surgery:  Get plenty of rest.  A responsible adult must stay with you for at least 24 hours after you leave the surgery center.  Do not drive or use heavy equipment.  If you have weakness or tingling, don't drive or use heavy equipment until this feeling goes away.   Do not drink alcohol.   Avoid strenuous or risky activities.  Ask for help when climbing stairs.  You may feel lightheaded.  IF so, sit for a few minutes before standing.  Have someone help you get up.   If you have nausea (feel sick to your stomach): Drink only clear liquids such as apple juice, ginger ale, broth or 7-Up.  Rest may also help.  Be sure to drink enough fluids.  Move to a regular diet as you feel able.   You may have a slight fever.  Call the doctor if your fever is over 100 F (37.7 C) (taken under the tongue) or lasts longer than 24 hours.  You may have a dry mouth, a sore throat, muscle aches or trouble sleeping. These should go away after 24 hours.  Do not make important or legal decisions.   It is recommended to avoid smoking.               Tips for taking pain medications  To get the best pain relief possible, remember these points:  Take pain medications as directed, before pain becomes severe.  Pain medication can upset your stomach: taking it with food may help.  Constipation is a common side effect of pain medication. Drink plenty of  fluids.  Eat foods high in fiber. Take a stool softener if recommended by your doctor or pharmacist.  Do not drink alcohol, drive or operate machinery while taking pain medications.  Ask about other ways to control pain, such as with heat, ice or relaxation.    Tylenol/Acetaminophen Consumption    If you feel your pain relief is insufficient, you may take Tylenol/Acetaminophen in addition to your narcotic pain medication.   Be careful not to exceed 4,000 mg of Tylenol/Acetaminophen in a 24 hour  period from all sources.  If you are taking extra strength Tylenol/acetaminophen (500 mg), the maximum dose is 8 tablets in 24 hours.  If you are taking regular strength acetaminophen (325 mg), the maximum dose is 12 tablets in 24 hours.    Call a doctor for any of the following:  Signs of infection (fever, growing tenderness at the surgery site, a large amount of drainage or bleeding, severe pain, foul-smelling drainage, redness, swelling).  It has been over 8 to 10 hours since surgery and you are still not able to urinate (pass water).  Headache for over 24 hours.  Numbness, tingling or weakness the day after surgery (if you had spinal anesthesia).  Signs of Covid-19 infection (temperature over 100 degrees, shortness of breath, cough, loss of taste/smell, generalized body aches, persistent headache, chills, sore throat, nausea/vomiting/diarrhea)  Your doctor is:  Dr. Yoandy Lisa, Prostate and Urology: [phone]                    Or dial [phone] and ask for the resident on call for:  Prostate Urology  For emergency care, call the:  [facility]:  [phone] (TTY for hearing impaired: [phone])

## 2024-01-05 NOTE — ANESTHESIA POSTPROCEDURE EVALUATION
Patient: Bhavani White    Procedure: Procedure(s):  CYSTOSCOPY, WITH BOTULINUM TOXIN INJECTION  CYSTOURETEROSCOPY, WITH RETROGRADE PYELOGRAM, STENT removal, exchange of suprapubic catheter       Anesthesia Type:  General    Note:     Postop Pain Control: Uneventful            Sign Out: Well controlled pain   PONV: No   Neuro/Psych: Uneventful            Sign Out: Acceptable/Baseline neuro status   Airway/Respiratory: Uneventful            Sign Out: Acceptable/Baseline resp. status   CV/Hemodynamics: Uneventful            Sign Out: Acceptable CV status; No obvious hypovolemia; No obvious fluid overload   Other NRE: NONE   DID A NON-ROUTINE EVENT OCCUR? No           Last vitals:  Vitals Value Taken Time   /56 01/05/24 1430   Temp 36.2  C (97.1  F) 01/05/24 1430   Pulse 88 01/05/24 1430   Resp 14 01/05/24 1430   SpO2 92 % 01/05/24 1426   Vitals shown include unfiled device data.    Electronically Signed By: Сегрей Akhtar MD  January 5, 2024  3:18 PM

## 2024-01-05 NOTE — ANESTHESIA PREPROCEDURE EVALUATION
Anesthesia Pre-Procedure Evaluation    Patient: Bhavani White   MRN: [id number] : 1955        Procedure : Procedure(s):  CYSTOSCOPY, WITH BOTULINUM TOXIN INJECTION  CYSTOURETEROSCOPY, WITH RETROGRADE PYELOGRAM, HOLMIUM LASER LITHOTRIPSY OF URETERAL CALCULUS, AND STENT INSERTION          Past Medical History:   Diagnosis Date    Abdominal wall cellulitis 2020    Abdominopelvic abscess (H) 2020    Anxiety     Asthma     Depression     DM (diabetes mellitus) (H)     with peripheral neuropathy    Fracture of lower extremity 2013    Frequent UTI     History of blood transfusion     History of ulcer disease 2014    She notes from NSAIDs; nearly . Discussed a hospitalization at [facility] for this.    Hypertension     Hypothyroid     Iatrogenic Cushing's disease     off prednisone now    Infection due to alpha-hemolytic Streptococcus 2020    Morbid obesity (H)     MRSA (methicillin resistant staph aureus) culture positive 2012    repeat cx 10/12/2012 no staph mentioned.    ANIYA -- noncompliant with CPAP     Osteoarthritis     multiple joings.    Other chronic pain     Perforated duodenal ulcer -- S/P Surgery 2019    SBO (small bowel obstruction) (H) 2020    Sepsis (WBC 20K, ) 2020    Sleep apnea     No longer uses cpap.      Past Surgical History:   Procedure Laterality Date    ABDOMEN SURGERY      APPLY WOUND VAC  2019    Procedure: Apply Wound Vac;  Surgeon: Ayan Lopez MD;  Location:  OR    CLOSE SECONDARY WOUND ABDOMEN N/A 2020    Procedure: SECONDARY ABDOMINAL WOUND CLOSURE;  Surgeon: Ayan Lopez MD;  Location:  OR    COMBINED CYSTOSCOPY, RETROGRADES, EXCHANGE STENT URETER(S) Left 10/30/2023    Procedure: Combined cystoscopy, left retrogrades, left stent placement;  Surgeon: Steven Shen MD;  Location:  OR    CYSTOSCOPY N/A 2018    Procedure: CYSTOSCOPY;  Cystoscopy and Botox Injection Into  the Bladder and suprapubic tube exchange;  Surgeon: Yoandy Rios MD;  Location: [facility]    CYSTOSCOPY N/A 3/15/2019    Procedure: Cystoscopy, suprapubic tube exchange;  Surgeon: Yoandy Rios MD;  [facility]    CYSTOSCOPY FLEXIBLE, CYSTOSTOMY, INSERT TUBE SUPRAPUBIC, COMBINED N/A 11/1/2019    Procedure: Cystoscopy, Bladder Botox Injection, Suprapubic Tube Change;  Surgeon: Yoandy Rios MD;  [facility]    CYSTOSCOPY, INJECT BOTOX N/A 11/18/2022    Procedure: CYSTOSCOPY, WITH BOTULINUM TOXIN INJECTION;  Surgeon: Yoandy Rios MD;  [facility]    CYSTOSCOPY, INTRAVESICAL INJECTION N/A 8/23/2017    Procedure: CYSTOSCOPY, INTRAVESICAL INJECTION;  Cystoscopy, Botox Injection Into The Bladder  Latex Allergy ;  Surgeon: Yoandy Rios MD;  [facility]: [facility] OR    CYSTOSCOPY, INTRAVESICAL INJECTION N/A 3/8/2018    Procedure: CYSTOSCOPY, INTRAVESICAL INJECTION;  Cystoscopy, Botox Injection Into The Bladder and suprapubic catheter exchange;  Surgeon: Yoandy Rios MD;  [facility] OR    DISCECTOMY, FUSION CERVICAL ANTERIOR THREE+ LEVELS, COMBINED Left 5/3/2016    Procedure: COMBINED DISCECTOMY, FUSION CERVICAL ANTERIOR THREE+ LEVELS;  Surgeon: Jasvir Torres MD;  [facility]: [facility]    ENTEROSCOPY SMALL BOWEL N/A 6/8/2020    Procedure: Enteroscopy small bowel;  Surgeon: Gabe Forte MD;  Location:  GI    GASTROSTOMY, INSERT TUBE, COMBINED N/A 1/1/2020    Procedure: GASTRIC-JEJUNAL FEEDING TUBE INSERTION;  Surgeon: Ayan Lopez MD;  Location:  OR    GENITOURINARY SURGERY      suprapubic catheter    HERNIA REPAIR  1957    double hernia    HYSTERECTOMY, PAP NO LONGER INDICATED      CELIA and large ovarian tumor removed    INJECT BOTOX N/A 9/21/2018    Procedure: INJECT BOTOX;;  Surgeon: Yoandy Rios MD;  [facility]    INJECT BOTOX N/A 3/15/2019    Procedure: Inject Botox into Bladder;  Surgeon: Yoandy Rios MD;   "[facility]: [facility]    IR GASTRO JEJUNOSTOMY TUBE PLACEMENT  6/11/2020    IR GASTROSTOMY TUBE CHANGE  6/30/2020    IR JEJUNOSTOMY TUBE CHANGE  2/4/2020    IR JEJUNOSTOMY TUBE CHANGE  2/19/2020    LAPAROSCOPY DIAGNOSTIC (GENERAL) N/A 12/30/2019    Procedure: Laparoscopy diagnostic (general);  Surgeon: Ayan Lopez MD;  Location: [facility] OR    LAPAROTOMY EXPLORATORY N/A 1/1/2020    Procedure: EXPLORATORY LAPAROTOMY;  Surgeon: Ayan Lopez MD;  Location: [facility] OR    LAPAROTOMY EXPLORATORY N/A 12/30/2019    Procedure: LAPAROTOMY, REPAIR OF ULCER PERFORATION;  Surgeon: Ayan Lopez MD;  Location: [facility] OR    SURGICAL HISTORY OF -       left cataract surgery    SURGICAL HISTORY OF -   12/14    right cataract surgery and left laser revision    SURGICAL HISTORY OF -   March 2015    left carpal tunnel release    TONSILLECTOMY  1974    ZZC LAMINECTOMY,FACETECTOMY,LUMBAR  1982    ZZC TOTAL KNEE ARTHROPLASTY  1999    left      Allergies   Allergen Reactions    Povidone Iodine      \"mild skin irritation\" per patient report    Toradol [Ketorolac] Other (See Comments)     Pt gets nightmares    Tramadol Other (See Comments)      Social History     Tobacco Use    Smoking status: Never    Smokeless tobacco: Never   Substance Use Topics    Alcohol use: No      Wt Readings from Last 1 Encounters:   01/05/24 90.7 kg (200 lb)        Anesthesia Evaluation   Pt has had prior anesthetic. Type: General and MAC.    No history of anesthetic complications       ROS/MED HX  ENT/Pulmonary:     (+) sleep apnea, doesn't use CPAP,                   Intermittent, asthma                  Neurologic:  - neg neurologic ROS     Cardiovascular:     (+)  hypertension- -  CAD -  - -      CHF                                METS/Exercise Tolerance:     Hematologic:  - neg hematologic  ROS     Musculoskeletal:       GI/Hepatic:     (+) GERD,                   Renal/Genitourinary:     (+) renal disease,             Endo:     (+)  type II DM,        " thyroid problem,     Obesity,    (-) Type I DM   Psychiatric/Substance Use:  - neg psychiatric ROS     Infectious Disease:  - neg infectious disease ROS     Malignancy:  - neg malignancy ROS     Other:  - neg other ROS    (+)  , H/O Chronic Pain,         Physical Exam    Airway        Mallampati: III   TM distance: > 3 FB   Neck ROM: full   Mouth opening: > 3 cm    Respiratory Devices and Support         Dental       (+) Completely normal teeth      Cardiovascular   cardiovascular exam normal       Rhythm and rate: regular and normal     Pulmonary   pulmonary exam normal                OUTSIDE LABS:  CBC:   Lab Results   Component Value Date    WBC 6.5 01/04/2024    WBC 9.2 10/31/2023    HGB 14.1 01/04/2024    HGB 12.6 10/31/2023    HCT 44.0 01/04/2024    HCT 40.9 10/31/2023     01/04/2024     10/31/2023     BMP:   Lab Results   Component Value Date     01/04/2024     10/31/2023    POTASSIUM 4.5 01/04/2024    POTASSIUM 4.4 10/31/2023    CHLORIDE 100 01/04/2024    CHLORIDE 104 10/31/2023    CO2 36 (H) 01/04/2024    CO2 27 10/31/2023    BUN 22.7 01/04/2024    BUN 16.5 10/31/2023    CR 1.14 (H) 01/04/2024    CR 0.86 10/31/2023    GLC 84 01/04/2024     (H) 11/22/2023     COAGS:   Lab Results   Component Value Date    PTT 26 10/23/2023    INR 0.99 10/23/2023    FIBR 576 (H) 01/19/2009     POC:   Lab Results   Component Value Date     (H) 06/16/2020     HEPATIC:   Lab Results   Component Value Date    ALBUMIN 4.5 10/29/2023    PROTTOTAL 8.3 10/29/2023    ALT 42 10/29/2023    AST 32 10/29/2023    ALKPHOS 105 (H) 10/29/2023    BILITOTAL 0.6 10/29/2023     OTHER:   Lab Results   Component Value Date    PH 7.35 01/18/2020    LACT 1.7 10/29/2023    A1C 6.2 (H) 01/04/2024    DORY 10.4 (H) 01/04/2024    PHOS 3.3 06/15/2020    MAG 1.9 06/15/2020    LIPASE 34 (L) 06/03/2020    AMYLASE 44 01/08/2009    TSH 0.40 08/08/2023    T4 1.03 08/15/2018    CRP 30.0 (H) 05/16/2016    SED 11 03/04/2010  "      Anesthesia Plan    ASA Status:  3    NPO Status:  NPO Appropriate    Anesthesia Type: General.     - Airway: LMA   Induction: Intravenous.   Maintenance: TIVA.        Consents    Anesthesia Plan(s) and associated risks, benefits, and realistic alternatives discussed. Questions answered and patient/representative(s) expressed understanding.     - Discussed:     - Discussed with:  Patient            Postoperative Care    Pain management: IV analgesics.   PONV prophylaxis: Ondansetron (or other 5HT-3)     Comments:               Сергей Akhtar MD    I have reviewed the pertinent notes and labs in the chart from the past 30 days and (re)examined the patient.  Any updates or changes from those notes are reflected in this note.      # Hypercalcemia: Highest Ca = 10.4 mg/dL in last 2 days, will monitor as appropriate        # Drug Induced Platelet Defect: home medication list includes an antiplatelet medication  # Obesity: Estimated body mass index is 33.28 kg/m  as calculated from the following:    Height as of this encounter: 1.651 m (5' 5\").    Weight as of this encounter: 90.7 kg (200 lb).      "

## 2024-01-05 NOTE — ANESTHESIA CARE TRANSFER NOTE
Patient: Bhavani White    Procedure: Procedure(s):  CYSTOSCOPY, WITH BOTULINUM TOXIN INJECTION  CYSTOURETEROSCOPY, WITH RETROGRADE PYELOGRAM, STENT removal, exchange of suprapubic catheter       Diagnosis: Neurogenic bladder [N31.9]  Ureteral stone [N20.1]  Diagnosis Additional Information: No value filed.    Anesthesia Type:   General     Note:    Oropharynx: oropharynx clear of all foreign objects and spontaneously breathing  Level of Consciousness: awake  Oxygen Supplementation: room air    Independent Airway: airway patency satisfactory and stable  Dentition: dentition unchanged  Vital Signs Stable: post-procedure vital signs reviewed and stable  Report to RN Given: handoff report given  Patient transferred to: PACU    Handoff Report: Identifed the Patient, Identified the Reponsible Provider, Reviewed the pertinent medical history, Discussed the surgical course, Reviewed Intra-OP anesthesia mangement and issues during anesthesia, Set expectations for post-procedure period and Allowed opportunity for questions and acknowledgement of understanding      Vitals:  Vitals Value Taken Time   /107 01/05/24 1416   Temp 37  C (98.6  F) 01/05/24 1412   Pulse 89 01/05/24 1423   Resp 14 01/05/24 1424   SpO2 91 % 01/05/24 1424   Vitals shown include unfiled device data.    Electronically Signed By: ELISSA Villatoro CRNA  January 5, 2024  2:25 PM

## 2024-02-02 NOTE — TELEPHONE ENCOUNTER
Reason for visit: post op [facility] prior    Relevant information: cystoscopy botox 1/5/24    Records/imaging/labs/orders: [facility] scheduled 2/12/24    Pt called: No need for a call    At Rooming: daylin Ramirez  2/2/2024  9:45 AM

## 2024-02-14 ENCOUNTER — TELEPHONE (OUTPATIENT)
Dept: FAMILY MEDICINE | Facility: CLINIC | Age: 69
End: 2024-02-14
Payer: COMMERCIAL

## 2024-02-14 NOTE — TELEPHONE ENCOUNTER
Pina, Investigator with [facility], called the clinic to report that this pt is  as of 24.  She states the death appeared natural, no trauma was evident. The pt was found in her home.   She also called to verify the reasoning behind the pt being wheelchair bound. Per chart review, writer informed Pina that documentation stated the pt was wheelchair bound d/t chronic pain, weakness, and paralysis.     Writer completed notification of  patient form, and sent it to  pt email on form per protocol.     Sending to PCP as SHALONDA.  Thank you,  Helen West RN

## (undated) DEVICE — BAG URINARY DRAIN 4000ML LF 153509

## (undated) DEVICE — SU SILK 2-0 FS-1 18" 685G

## (undated) DEVICE — DRAIN JACKSON PRATT RESERVOIR 100ML SU130-1305

## (undated) DEVICE — PACK GOWN 3/PK DISP XL SBA32GPFCB

## (undated) DEVICE — SYR INFLATION DEVICE 20ML W/ 26GA TORQUE DEVICE M001151062

## (undated) DEVICE — SUCTION MANIFOLD NEPTUNE 2 SYS 4 PORT 0702-020-000

## (undated) DEVICE — SOL WATER IRRIG 3000ML BAG 2B7117

## (undated) DEVICE — SOL NACL 0.9% IRRIG 3000ML BAG 2B7477

## (undated) DEVICE — NDL ASPIRATING INJECT 22GA 31.25CM

## (undated) DEVICE — GLOVE PROTEXIS W/NEU-THERA 8.0  2D73TE80

## (undated) DEVICE — ESU GROUND PAD UNIVERSAL W/O CORD

## (undated) DEVICE — LASER FIBER HOLMIUM 550 SIDEFIRE M0068408460 840-846

## (undated) DEVICE — Device

## (undated) DEVICE — SYR 10ML LL W/O NDL

## (undated) DEVICE — SYR PISTON URETHRAL 60ML 68000

## (undated) DEVICE — LINEN TOWEL PACK X5 5464

## (undated) DEVICE — GLOVE PROTEXIS W/NEU-THERA 7.5  2D73TE75

## (undated) DEVICE — SUCTION MANIFOLD NEPTUNE 2 SYS 1 PORT 702-025-000

## (undated) DEVICE — RAD KNIFE HANDLE W/11 BLADE DISPOSABLE 371611

## (undated) DEVICE — PREP POVIDONE IODINE SOLUTION 10% 4OZ BOTTLE 29906-004

## (undated) DEVICE — LINEN GOWN XLG 5407

## (undated) DEVICE — CATH URETERAL OPEN END 5FRX70CM M0064002010

## (undated) DEVICE — PACK CYSTO CUSTOM ASC

## (undated) DEVICE — ENDO TROCAR FIRST ENTRY KII FIOS Z-THRD 05X100MM CTF03

## (undated) DEVICE — NDL WOLF ASPIRATING INJECT 22GA 31.25CM 8652.7775

## (undated) DEVICE — SYR 50ML LL W/O NDL 309653

## (undated) DEVICE — SYR 70ML TOOMEY 041170

## (undated) DEVICE — SOL NACL 0.9% IRRIG 500ML BOTTLE 2F7123

## (undated) DEVICE — PACK CYSTO UMMC CUSTOM

## (undated) DEVICE — TUBE TRANS GASTROJEJUNOSTOMY ENFIT 16FRX45CM

## (undated) DEVICE — BLADE KNIFE SURG 15 371115

## (undated) DEVICE — SU VICRYL 0 CT-2 27" J334H

## (undated) DEVICE — DRAPE POUCH INSTRUMENT 1018

## (undated) DEVICE — COVER ULTRASOUND PROBE W/GEL FLEXI-FEEL 6"X58" LF  25-FF658

## (undated) DEVICE — PAD CHUX UNDERPAD 30X30"

## (undated) DEVICE — SOL NACL 0.9% IRRIG 1000ML BOTTLE 2F7124

## (undated) DEVICE — ENDO SEAL BX PORT BPS-A

## (undated) DEVICE — SYR 30ML LL W/O NDL 302832

## (undated) DEVICE — SOL NACL 0.9% 10ML VIAL 0409-4888-02

## (undated) DEVICE — CATH FOLYSIL 16FR 15ML AA6116

## (undated) DEVICE — SYSTEM CLEARIFY VISUALIZATION 21-345

## (undated) DEVICE — SYR 10ML FINGER CONTROL W/O NDL 309695

## (undated) DEVICE — MANIFOLD NEPTUNE 4 PORT 700-20

## (undated) DEVICE — ESU PENCIL W/HOLSTER

## (undated) DEVICE — GLOVE BIOGEL PI MICRO SZ 8.0 48580

## (undated) DEVICE — ENDO TROCAR SLEEVE KII Z-THREADED 05X100MM CTS02

## (undated) DEVICE — PUMP SYSTEM SINGLE ACTION M0067201000

## (undated) DEVICE — CONNECTOR WATER VALVE PERFUSION PACK STR 020272801

## (undated) DEVICE — SOL NACL 0.9% INJ 1000ML BAG 2B1324X

## (undated) DEVICE — CATH MALECOT 30FR LATEX 86030

## (undated) DEVICE — CATH URETERAL TIGERTAIL 05FR 70CM 139005

## (undated) DEVICE — ESU LIGASURE IMPACT OPEN SEALER/DVDR CVD LG JAW LF4418

## (undated) DEVICE — CATH INTRODUCER SUPRAPUBIC 16FR SF-S16-851

## (undated) DEVICE — NDL BLUNT 18GA 1" W/O FILTER 305181

## (undated) DEVICE — DRAPE C-ARM W/STRAPS 42X72" 07-CA104

## (undated) DEVICE — CATH FOLEY 24FR 5ML 0165SI24 LATEX

## (undated) DEVICE — SOL WATER IRRIG 1000ML BOTTLE 2F7114

## (undated) DEVICE — SYR 10ML LL W/O NDL 302995

## (undated) DEVICE — GOWN IMPERVIOUS ZONED XLG 9041

## (undated) DEVICE — ESU LIGASURE LAPAROSCOPIC BLUNT TIP SEALER 5MMX37CM LF1837

## (undated) DEVICE — SU VICRYL 3-0 SH 27" J316H

## (undated) DEVICE — GOWN IMPERVIOUS SPECIALTY XLG/XLONG 32474

## (undated) DEVICE — DRSG DRAIN 4X4" 7086

## (undated) DEVICE — SOL WATER IRRIG 500ML BOTTLE 2F7113

## (undated) DEVICE — GLOVE BIOGEL PI MICRO SZ 7.5 48575

## (undated) DEVICE — SUCTION IRR STRYKERFLOW II W/TIP 250-070-520

## (undated) DEVICE — NDL BLADDER INJ BONEE 70CM NBI070

## (undated) DEVICE — BAG DRAINAGE URO CATCHER II 4-040-14-10

## (undated) DEVICE — NDL 25GA 1.5" 305127

## (undated) DEVICE — CATH MALECOT 26FR LATEX 086026

## (undated) DEVICE — SU VICRYL 3-0 SH CR 8X18" J774

## (undated) DEVICE — BAG URINARY DRAIN LUBRISIL IC 4000ML LF 253509A

## (undated) DEVICE — BLADE CLIPPER SGL USE 9680

## (undated) DEVICE — DRAPE MAYO STAND 23X54 8337

## (undated) DEVICE — DRSG GAUZE 4X4" 3033

## (undated) DEVICE — SU ETHILON 2-0 FS 18" 664H

## (undated) DEVICE — DRAPE LAP W/POUCH 9430

## (undated) DEVICE — PREP CHLORAPREP 26ML TINTED ORANGE  260815

## (undated) DEVICE — STPL SKIN 35W ROTATING HEAD PRW35

## (undated) DEVICE — SU PDS II 0 CTX 60" Z990G

## (undated) DEVICE — SU SILK 2-0 SH 30" K833H

## (undated) DEVICE — CATH FOLEY 24FR 5ML SILICONE LUBRI-SIL 175824

## (undated) DEVICE — SPECIMEN CONTAINER 5OZ STERILE 2600SA

## (undated) DEVICE — RAD RX ISOVUE 300 (50ML) 61% IOPAMIDOL CHARGE PER ML

## (undated) DEVICE — PAD CHUX UNDERPAD 23X24" 7136

## (undated) DEVICE — GUIDEWIRE SENSOR DUAL FLEX STR 0.035"X150CM M0066703080

## (undated) DEVICE — NDL PERC ACCESS 18GX20CM M0067001220

## (undated) DEVICE — CATH URETERAL OPEN END 05FR 120CM 020015-S13

## (undated) DEVICE — PREP POVIDONE-IODINE 7.5% SCRUB 4OZ BOTTLE MDS093945

## (undated) DEVICE — DRAIN JACKSON PRATT CHANNEL 19FR ROUND HUBLESS SIL JP-2230

## (undated) DEVICE — DRSG ABTHERA NEG PRESSURE VAC ABD SENSAT M8275026/5.S

## (undated) DEVICE — EVAC SYSTEM CLEAR FLOW SC082500

## (undated) DEVICE — ESU GROUND PAD ADULT W/CORD E7507

## (undated) DEVICE — CANISTER WOUND VAC W/GEL 1000ML M8275093/5

## (undated) DEVICE — PACK CYSTOSCOPY SBA15CYFSI

## (undated) DEVICE — DRAPE LAP W/ARMBOARD 29410

## (undated) DEVICE — SHEATH URETERAL 12/14FRX35CM SET

## (undated) DEVICE — GUIDEWIRE URO STR STIFF .035"X150CM NITINOL 150NSS35

## (undated) DEVICE — TUBING SET THERMEDX UROLOGY SGL USE LL0006

## (undated) DEVICE — SUCTION TIP POOLE K770

## (undated) DEVICE — PAD CHUX UNDERPAD 30X36" P3036C

## (undated) DEVICE — SPONGE LAP 18X18" X8435

## (undated) DEVICE — SU PDS II 0 CT-2 27" Z334H

## (undated) DEVICE — BAG URINARY DRAIN LEG MEDIUM 19OZ LF 150719

## (undated) DEVICE — DRSG STERI STRIP 1/2X4" R1547

## (undated) DEVICE — PACK MAJOR SBA15MAFSI

## (undated) DEVICE — DRAPE TIBURON TOP SHEET 100X60" 29352

## (undated) DEVICE — CATH PLUG W/CAP 000076

## (undated) DEVICE — SU MONOCRYL 4-0 PS-2 18" UND Y496G

## (undated) DEVICE — NDL COUNTER 20CT 31142493

## (undated) DEVICE — SU PROLENE 2-0 SHDA 48" 8533H

## (undated) DEVICE — LASER FIBER HOLMIUM 200 RBLF200

## (undated) DEVICE — SU VICRYL 4-0 PS-2 18" UND J496H

## (undated) DEVICE — TUBING SUCTION MEDI-VAC SOFT 3/16"X20' N520A

## (undated) DEVICE — BLADE KNIFE SURG 11 371111

## (undated) DEVICE — SU SILK 3-0 SH CR 8X18" C013D

## (undated) DEVICE — TUBE GASTROSTOMY MIC ENFIT 18FR 8100-18

## (undated) DEVICE — DRSG GAUZE 4X4" TRAY 6939

## (undated) DEVICE — PREP SCRUB CARE CHLOROXYLENOL (PCMX) 4OZ 29902-004

## (undated) DEVICE — GLOVE GAMMEX DERMAPRENE ULTRA SZ 8 LF 8516

## (undated) DEVICE — ANTIFOG SOLUTION W/FOAM PAD 31142527

## (undated) DEVICE — SUCTION MANIFOLD DORNOCH ULTRA CART UL-CL500

## (undated) DEVICE — KIT ENDO FIRST STEP DISINFECTANT 200ML W/POUCH EP-4

## (undated) DEVICE — SYR 10ML SLIP TIP W/O NDL 303134

## (undated) RX ORDER — ACETAMINOPHEN 325 MG/1
TABLET ORAL
Status: DISPENSED
Start: 2019-03-15

## (undated) RX ORDER — LIDOCAINE HYDROCHLORIDE 10 MG/ML
INJECTION, SOLUTION INFILTRATION; PERINEURAL
Status: DISPENSED
Start: 2020-06-11

## (undated) RX ORDER — KETAMINE HYDROCHLORIDE 10 MG/ML
INJECTION INTRAMUSCULAR; INTRAVENOUS
Status: DISPENSED
Start: 2018-09-21

## (undated) RX ORDER — LIDOCAINE HYDROCHLORIDE 10 MG/ML
INJECTION, SOLUTION EPIDURAL; INFILTRATION; INTRACAUDAL; PERINEURAL
Status: DISPENSED
Start: 2020-01-01

## (undated) RX ORDER — FENTANYL CITRATE 50 UG/ML
INJECTION, SOLUTION INTRAMUSCULAR; INTRAVENOUS
Status: DISPENSED
Start: 2017-08-23

## (undated) RX ORDER — OXYCODONE HYDROCHLORIDE 5 MG/1
TABLET ORAL
Status: DISPENSED
Start: 2022-11-18

## (undated) RX ORDER — PROPOFOL 10 MG/ML
INJECTION, EMULSION INTRAVENOUS
Status: DISPENSED
Start: 2018-09-21

## (undated) RX ORDER — PROPOFOL 10 MG/ML
INJECTION, EMULSION INTRAVENOUS
Status: DISPENSED
Start: 2020-01-01

## (undated) RX ORDER — KETAMINE HYDROCHLORIDE 10 MG/ML
INJECTION, SOLUTION INTRAMUSCULAR; INTRAVENOUS
Status: DISPENSED
Start: 2017-08-23

## (undated) RX ORDER — REGADENOSON 0.08 MG/ML
INJECTION, SOLUTION INTRAVENOUS
Status: DISPENSED
Start: 2020-05-05

## (undated) RX ORDER — LIDOCAINE HYDROCHLORIDE 20 MG/ML
JELLY TOPICAL
Status: DISPENSED
Start: 2019-11-01

## (undated) RX ORDER — PROPOFOL 10 MG/ML
INJECTION, EMULSION INTRAVENOUS
Status: DISPENSED
Start: 2017-08-23

## (undated) RX ORDER — FENTANYL CITRATE 50 UG/ML
INJECTION, SOLUTION INTRAMUSCULAR; INTRAVENOUS
Status: DISPENSED
Start: 2019-12-30

## (undated) RX ORDER — PROPOFOL 10 MG/ML
INJECTION, EMULSION INTRAVENOUS
Status: DISPENSED
Start: 2019-11-01

## (undated) RX ORDER — OXYCODONE HYDROCHLORIDE 5 MG/1
TABLET ORAL
Status: DISPENSED
Start: 2018-09-21

## (undated) RX ORDER — KETAMINE HCL IN 0.9 % NACL 50 MG/5 ML
SYRINGE (ML) INTRAVENOUS
Status: DISPENSED
Start: 2019-11-01

## (undated) RX ORDER — FENTANYL CITRATE 0.05 MG/ML
INJECTION, SOLUTION INTRAMUSCULAR; INTRAVENOUS
Status: DISPENSED
Start: 2023-01-01

## (undated) RX ORDER — ONDANSETRON 2 MG/ML
INJECTION INTRAMUSCULAR; INTRAVENOUS
Status: DISPENSED
Start: 2017-08-23

## (undated) RX ORDER — PROPOFOL 10 MG/ML
INJECTION, EMULSION INTRAVENOUS
Status: DISPENSED
Start: 2023-01-01

## (undated) RX ORDER — FENTANYL CITRATE 50 UG/ML
INJECTION, SOLUTION INTRAMUSCULAR; INTRAVENOUS
Status: DISPENSED
Start: 2023-01-01

## (undated) RX ORDER — AMPICILLIN 2 G/1
INJECTION, POWDER, FOR SOLUTION INTRAVENOUS
Status: DISPENSED
Start: 2018-03-08

## (undated) RX ORDER — LIDOCAINE HYDROCHLORIDE 20 MG/ML
INJECTION, SOLUTION EPIDURAL; INFILTRATION; INTRACAUDAL; PERINEURAL
Status: DISPENSED
Start: 2019-11-01

## (undated) RX ORDER — EPHEDRINE SULFATE 50 MG/ML
INJECTION, SOLUTION INTRAMUSCULAR; INTRAVENOUS; SUBCUTANEOUS
Status: DISPENSED
Start: 2017-08-23

## (undated) RX ORDER — GABAPENTIN 300 MG/1
CAPSULE ORAL
Status: DISPENSED
Start: 2019-11-01

## (undated) RX ORDER — EPHEDRINE SULFATE 50 MG/ML
INJECTION, SOLUTION INTRAMUSCULAR; INTRAVENOUS; SUBCUTANEOUS
Status: DISPENSED
Start: 2023-01-01

## (undated) RX ORDER — VANCOMYCIN HYDROCHLORIDE 500 MG/10ML
INJECTION, POWDER, LYOPHILIZED, FOR SOLUTION INTRAVENOUS
Status: DISPENSED
Start: 2022-11-18

## (undated) RX ORDER — LIDOCAINE HYDROCHLORIDE 20 MG/ML
INJECTION, SOLUTION EPIDURAL; INFILTRATION; INTRACAUDAL; PERINEURAL
Status: DISPENSED
Start: 2018-03-08

## (undated) RX ORDER — LIDOCAINE HYDROCHLORIDE 10 MG/ML
INJECTION, SOLUTION INFILTRATION; PERINEURAL
Status: DISPENSED
Start: 2020-06-30

## (undated) RX ORDER — VANCOMYCIN HYDROCHLORIDE 1 G/20ML
INJECTION, POWDER, LYOPHILIZED, FOR SOLUTION INTRAVENOUS
Status: DISPENSED
Start: 2022-11-18

## (undated) RX ORDER — FENTANYL CITRATE-0.9 % NACL/PF 10 MCG/ML
PLASTIC BAG, INJECTION (ML) INTRAVENOUS
Status: DISPENSED
Start: 2024-01-01

## (undated) RX ORDER — CEFAZOLIN SODIUM 1 G/3ML
INJECTION, POWDER, FOR SOLUTION INTRAMUSCULAR; INTRAVENOUS
Status: DISPENSED
Start: 2019-03-15

## (undated) RX ORDER — GABAPENTIN 300 MG/1
CAPSULE ORAL
Status: DISPENSED
Start: 2018-09-21

## (undated) RX ORDER — LIDOCAINE HYDROCHLORIDE 20 MG/ML
INJECTION, SOLUTION EPIDURAL; INFILTRATION; INTRACAUDAL; PERINEURAL
Status: DISPENSED
Start: 2017-08-23

## (undated) RX ORDER — ONDANSETRON 2 MG/ML
INJECTION INTRAMUSCULAR; INTRAVENOUS
Status: DISPENSED
Start: 2018-09-21

## (undated) RX ORDER — DEXAMETHASONE SODIUM PHOSPHATE 4 MG/ML
INJECTION, SOLUTION INTRA-ARTICULAR; INTRALESIONAL; INTRAMUSCULAR; INTRAVENOUS; SOFT TISSUE
Status: DISPENSED
Start: 2023-01-01

## (undated) RX ORDER — ACETAMINOPHEN 325 MG/1
TABLET ORAL
Status: DISPENSED
Start: 2023-01-01

## (undated) RX ORDER — FENTANYL CITRATE 50 UG/ML
INJECTION, SOLUTION INTRAMUSCULAR; INTRAVENOUS
Status: DISPENSED
Start: 2020-06-11

## (undated) RX ORDER — ALBUTEROL SULFATE 90 UG/1
AEROSOL, METERED RESPIRATORY (INHALATION)
Status: DISPENSED
Start: 2019-11-01

## (undated) RX ORDER — FENTANYL CITRATE 50 UG/ML
INJECTION, SOLUTION INTRAMUSCULAR; INTRAVENOUS
Status: DISPENSED
Start: 2020-01-27

## (undated) RX ORDER — NALOXONE HYDROCHLORIDE 0.4 MG/ML
INJECTION, SOLUTION INTRAMUSCULAR; INTRAVENOUS; SUBCUTANEOUS
Status: DISPENSED
Start: 2020-01-27

## (undated) RX ORDER — ONDANSETRON 2 MG/ML
INJECTION INTRAMUSCULAR; INTRAVENOUS
Status: DISPENSED
Start: 2019-03-15

## (undated) RX ORDER — FENTANYL CITRATE 50 UG/ML
INJECTION, SOLUTION INTRAMUSCULAR; INTRAVENOUS
Status: DISPENSED
Start: 2020-06-08

## (undated) RX ORDER — ONDANSETRON 2 MG/ML
INJECTION INTRAMUSCULAR; INTRAVENOUS
Status: DISPENSED
Start: 2023-01-01

## (undated) RX ORDER — PHENYLEPHRINE HCL IN 0.9% NACL 1 MG/10 ML
SYRINGE (ML) INTRAVENOUS
Status: DISPENSED
Start: 2017-08-23

## (undated) RX ORDER — CEFAZOLIN SODIUM 2 G/50ML
SOLUTION INTRAVENOUS
Status: DISPENSED
Start: 2024-01-01

## (undated) RX ORDER — ONDANSETRON 2 MG/ML
INJECTION INTRAMUSCULAR; INTRAVENOUS
Status: DISPENSED
Start: 2024-01-01

## (undated) RX ORDER — CEFAZOLIN SODIUM 1 G/3ML
INJECTION, POWDER, FOR SOLUTION INTRAMUSCULAR; INTRAVENOUS
Status: DISPENSED
Start: 2019-11-01

## (undated) RX ORDER — LIDOCAINE HYDROCHLORIDE 20 MG/ML
INJECTION, SOLUTION EPIDURAL; INFILTRATION; INTRACAUDAL; PERINEURAL
Status: DISPENSED
Start: 2019-03-15

## (undated) RX ORDER — FLUMAZENIL 0.1 MG/ML
INJECTION, SOLUTION INTRAVENOUS
Status: DISPENSED
Start: 2020-01-27

## (undated) RX ORDER — DEXAMETHASONE SODIUM PHOSPHATE 4 MG/ML
INJECTION, SOLUTION INTRA-ARTICULAR; INTRALESIONAL; INTRAMUSCULAR; INTRAVENOUS; SOFT TISSUE
Status: DISPENSED
Start: 2019-11-01

## (undated) RX ORDER — ACETAMINOPHEN 325 MG/1
TABLET ORAL
Status: DISPENSED
Start: 2019-11-01

## (undated) RX ORDER — DEXAMETHASONE SODIUM PHOSPHATE 4 MG/ML
INJECTION, SOLUTION INTRA-ARTICULAR; INTRALESIONAL; INTRAMUSCULAR; INTRAVENOUS; SOFT TISSUE
Status: DISPENSED
Start: 2024-01-01

## (undated) RX ORDER — FENTANYL CITRATE 50 UG/ML
INJECTION, SOLUTION INTRAMUSCULAR; INTRAVENOUS
Status: DISPENSED
Start: 2018-03-08

## (undated) RX ORDER — ALBUMIN, HUMAN INJ 5% 5 %
SOLUTION INTRAVENOUS
Status: DISPENSED
Start: 2019-12-30

## (undated) RX ORDER — CEFAZOLIN SODIUM 1 G/50ML
SOLUTION INTRAVENOUS
Status: DISPENSED
Start: 2017-08-23

## (undated) RX ORDER — PROPOFOL 10 MG/ML
INJECTION, EMULSION INTRAVENOUS
Status: DISPENSED
Start: 2024-01-01

## (undated) RX ORDER — DEXAMETHASONE SODIUM PHOSPHATE 4 MG/ML
INJECTION, SOLUTION INTRA-ARTICULAR; INTRALESIONAL; INTRAMUSCULAR; INTRAVENOUS; SOFT TISSUE
Status: DISPENSED
Start: 2018-09-21

## (undated) RX ORDER — GENTAMICIN 40 MG/ML
INJECTION, SOLUTION INTRAMUSCULAR; INTRAVENOUS
Status: DISPENSED
Start: 2022-11-18

## (undated) RX ORDER — CEFAZOLIN SODIUM/WATER 2 G/20 ML
SYRINGE (ML) INTRAVENOUS
Status: DISPENSED
Start: 2023-01-01

## (undated) RX ORDER — GABAPENTIN 100 MG/1
CAPSULE ORAL
Status: DISPENSED
Start: 2019-03-15

## (undated) RX ORDER — BUPIVACAINE HYDROCHLORIDE AND EPINEPHRINE 2.5; 5 MG/ML; UG/ML
INJECTION, SOLUTION EPIDURAL; INFILTRATION; INTRACAUDAL; PERINEURAL
Status: DISPENSED
Start: 2020-01-01

## (undated) RX ORDER — FENTANYL CITRATE 50 UG/ML
INJECTION, SOLUTION INTRAMUSCULAR; INTRAVENOUS
Status: DISPENSED
Start: 2024-01-01

## (undated) RX ORDER — KETAMINE HCL IN 0.9 % NACL 50 MG/5 ML
SYRINGE (ML) INTRAVENOUS
Status: DISPENSED
Start: 2019-03-15

## (undated) RX ORDER — PROPOFOL 10 MG/ML
INJECTION, EMULSION INTRAVENOUS
Status: DISPENSED
Start: 2019-03-15

## (undated) RX ORDER — OXYCODONE HYDROCHLORIDE 5 MG/1
TABLET ORAL
Status: DISPENSED
Start: 2023-01-01

## (undated) RX ORDER — HYDROMORPHONE HCL IN WATER/PF 6 MG/30 ML
PATIENT CONTROLLED ANALGESIA SYRINGE INTRAVENOUS
Status: DISPENSED
Start: 2023-01-01

## (undated) RX ORDER — ACETAMINOPHEN 325 MG/1
TABLET ORAL
Status: DISPENSED
Start: 2018-09-21

## (undated) RX ORDER — ONDANSETRON 2 MG/ML
INJECTION INTRAMUSCULAR; INTRAVENOUS
Status: DISPENSED
Start: 2019-11-01

## (undated) RX ORDER — ACETAMINOPHEN 325 MG/1
TABLET ORAL
Status: DISPENSED
Start: 2024-01-01